# Patient Record
Sex: FEMALE | Race: WHITE | NOT HISPANIC OR LATINO
[De-identification: names, ages, dates, MRNs, and addresses within clinical notes are randomized per-mention and may not be internally consistent; named-entity substitution may affect disease eponyms.]

---

## 2018-08-02 ENCOUNTER — APPOINTMENT (OUTPATIENT)
Dept: VASCULAR SURGERY | Facility: CLINIC | Age: 73
End: 2018-08-02

## 2018-10-13 ENCOUNTER — EMERGENCY (EMERGENCY)
Facility: HOSPITAL | Age: 73
LOS: 0 days | Discharge: HOME | End: 2018-10-13
Attending: EMERGENCY MEDICINE | Admitting: EMERGENCY MEDICINE

## 2018-10-13 VITALS
HEART RATE: 58 BPM | SYSTOLIC BLOOD PRESSURE: 129 MMHG | WEIGHT: 188.05 LBS | DIASTOLIC BLOOD PRESSURE: 72 MMHG | RESPIRATION RATE: 20 BRPM | HEIGHT: 62 IN | OXYGEN SATURATION: 98 % | TEMPERATURE: 97 F

## 2018-10-13 DIAGNOSIS — I10 ESSENTIAL (PRIMARY) HYPERTENSION: ICD-10-CM

## 2018-10-13 DIAGNOSIS — Z23 ENCOUNTER FOR IMMUNIZATION: ICD-10-CM
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
## 2018-10-13 DIAGNOSIS — Z88.6 ALLERGY STATUS TO ANALGESIC AGENT: ICD-10-CM
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
## 2018-10-13 DIAGNOSIS — F32.9 MAJOR DEPRESSIVE DISORDER, SINGLE EPISODE, UNSPECIFIED: ICD-10-CM
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
## 2018-10-13 DIAGNOSIS — S61.212A LACERATION WITHOUT FOREIGN BODY OF RIGHT MIDDLE FINGER WITHOUT DAMAGE TO NAIL, INITIAL ENCOUNTER: ICD-10-CM
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
## 2018-10-13 DIAGNOSIS — J44.9 CHRONIC OBSTRUCTIVE PULMONARY DISEASE, UNSPECIFIED: ICD-10-CM

## 2018-10-13 DIAGNOSIS — Z79.82 LONG TERM (CURRENT) USE OF ASPIRIN: ICD-10-CM

## 2018-10-13 DIAGNOSIS — Z90.49 ACQUIRED ABSENCE OF OTHER SPECIFIED PARTS OF DIGESTIVE TRACT: Chronic | ICD-10-CM

## 2018-10-13 DIAGNOSIS — Z90.49 ACQUIRED ABSENCE OF OTHER SPECIFIED PARTS OF DIGESTIVE TRACT: ICD-10-CM

## 2018-10-13 RX ORDER — TETANUS TOXOID, REDUCED DIPHTHERIA TOXOID AND ACELLULAR PERTUSSIS VACCINE, ADSORBED 5; 2.5; 8; 8; 2.5 [IU]/.5ML; [IU]/.5ML; UG/.5ML; UG/.5ML; UG/.5ML
0.5 SUSPENSION INTRAMUSCULAR ONCE
Qty: 0 | Refills: 0 | Status: COMPLETED | OUTPATIENT
Start: 2018-10-13 | End: 2018-10-13

## 2018-10-13 RX ADMIN — TETANUS TOXOID, REDUCED DIPHTHERIA TOXOID AND ACELLULAR PERTUSSIS VACCINE, ADSORBED 0.5 MILLILITER(S): 5; 2.5; 8; 8; 2.5 SUSPENSION INTRAMUSCULAR at 09:00

## 2018-10-13 NOTE — ED PROVIDER NOTE - NS ED ROS FT
Constitutional: (-) fever  Musculoskeletal: (-) neck pain, (-) back pain, (-) joint pain  Integumentary: (+) Lac  Neurological: (-) headache, (-) altered mental status

## 2018-10-13 NOTE — ED PROVIDER NOTE - PRINCIPAL DIAGNOSIS
Laceration of finger of right hand without foreign body without damage to nail, unspecified finger, subsequent encounter

## 2018-10-13 NOTE — ED ADULT NURSE NOTE - NSIMPLEMENTINTERV_GEN_ALL_ED
Implemented All Universal Safety Interventions:  [place] to call system. Call bell, personal items and telephone within reach. Instruct patient to call for assistance. Room bathroom lighting operational. Non-slip footwear when patient is off stretcher. Physically safe environment: no spills, clutter or unnecessary equipment. Stretcher in lowest position, wheels locked, appropriate side rails in place.

## 2018-10-13 NOTE — ED PROVIDER NOTE - CARE PLAN
Principal Discharge DX:	Laceration of finger of right hand without foreign body without damage to nail, unspecified finger, subsequent encounter

## 2018-10-13 NOTE — ED PROVIDER NOTE - OBJECTIVE STATEMENT
73 year old female past medical history of COPD and Hypertension and states was cleaning toilet bowel and cut her right middle finger on sharp edge about 5 hours ago and it wont stop bleeding.

## 2018-10-13 NOTE — ED PROVIDER NOTE - PHYSICAL EXAMINATION
Physical Exam    Vital Signs: I have reviewed the initial vital signs.  Constitutional: well-nourished, appears stated age, no acute distress  Musculoskeletal: supple neck, no lower extremity edema, no midline tenderness  Integumentary: +Lac to right index finger 1.5cm to pad of finger in U shape  Neurologic: awake, alert, cranial nerves II-XII grossly intact, extremities’ motor and sensory functions grossly intact  Psychiatric: appropriate mood, appropriate affect

## 2018-10-13 NOTE — ED PROVIDER NOTE - ATTENDING CONTRIBUTION TO CARE
Right middle finger laceration while cleaning bathroom. Td is not uptodate.   Bleeding since 5 am.     PE: tip finger flap of skin laceration     Rec: laceration repair per CLEMENCIA Dumont.

## 2018-10-22 ENCOUNTER — INPATIENT (INPATIENT)
Facility: HOSPITAL | Age: 73
LOS: 3 days | Discharge: HOME HEALTH | End: 2018-10-26
Attending: INTERNAL MEDICINE | Admitting: INTERNAL MEDICINE

## 2018-10-22 VITALS
TEMPERATURE: 96 F | WEIGHT: 184.97 LBS | OXYGEN SATURATION: 96 % | HEART RATE: 70 BPM | RESPIRATION RATE: 18 BRPM | HEIGHT: 62 IN

## 2018-10-22 DIAGNOSIS — N30.90 CYSTITIS, UNSPECIFIED WITHOUT HEMATURIA: ICD-10-CM

## 2018-10-22 DIAGNOSIS — R42 DIZZINESS AND GIDDINESS: ICD-10-CM

## 2018-10-22 DIAGNOSIS — R00.1 BRADYCARDIA, UNSPECIFIED: ICD-10-CM

## 2018-10-22 DIAGNOSIS — K59.00 CONSTIPATION, UNSPECIFIED: ICD-10-CM

## 2018-10-22 DIAGNOSIS — Z87.891 PERSONAL HISTORY OF NICOTINE DEPENDENCE: ICD-10-CM

## 2018-10-22 DIAGNOSIS — J81.0 ACUTE PULMONARY EDEMA: ICD-10-CM

## 2018-10-22 DIAGNOSIS — F32.9 MAJOR DEPRESSIVE DISORDER, SINGLE EPISODE, UNSPECIFIED: ICD-10-CM

## 2018-10-22 DIAGNOSIS — I73.9 PERIPHERAL VASCULAR DISEASE, UNSPECIFIED: ICD-10-CM

## 2018-10-22 DIAGNOSIS — Z88.8 ALLERGY STATUS TO OTHER DRUGS, MEDICAMENTS AND BIOLOGICAL SUBSTANCES: ICD-10-CM

## 2018-10-22 DIAGNOSIS — K21.9 GASTRO-ESOPHAGEAL REFLUX DISEASE WITHOUT ESOPHAGITIS: ICD-10-CM

## 2018-10-22 DIAGNOSIS — Z88.5 ALLERGY STATUS TO NARCOTIC AGENT: ICD-10-CM

## 2018-10-22 DIAGNOSIS — Z28.21 IMMUNIZATION NOT CARRIED OUT BECAUSE OF PATIENT REFUSAL: ICD-10-CM

## 2018-10-22 DIAGNOSIS — K29.70 GASTRITIS, UNSPECIFIED, WITHOUT BLEEDING: ICD-10-CM

## 2018-10-22 DIAGNOSIS — Z79.82 LONG TERM (CURRENT) USE OF ASPIRIN: ICD-10-CM

## 2018-10-22 DIAGNOSIS — J44.9 CHRONIC OBSTRUCTIVE PULMONARY DISEASE, UNSPECIFIED: ICD-10-CM

## 2018-10-22 DIAGNOSIS — Z79.899 OTHER LONG TERM (CURRENT) DRUG THERAPY: ICD-10-CM

## 2018-10-22 DIAGNOSIS — J96.01 ACUTE RESPIRATORY FAILURE WITH HYPOXIA: ICD-10-CM

## 2018-10-22 DIAGNOSIS — I42.8 OTHER CARDIOMYOPATHIES: ICD-10-CM

## 2018-10-22 DIAGNOSIS — N17.9 ACUTE KIDNEY FAILURE, UNSPECIFIED: ICD-10-CM

## 2018-10-22 DIAGNOSIS — R33.8 OTHER RETENTION OF URINE: ICD-10-CM

## 2018-10-22 DIAGNOSIS — Z86.73 PERSONAL HISTORY OF TRANSIENT ISCHEMIC ATTACK (TIA), AND CEREBRAL INFARCTION WITHOUT RESIDUAL DEFICITS: ICD-10-CM

## 2018-10-22 DIAGNOSIS — Z90.49 ACQUIRED ABSENCE OF OTHER SPECIFIED PARTS OF DIGESTIVE TRACT: Chronic | ICD-10-CM

## 2018-10-22 DIAGNOSIS — M19.90 UNSPECIFIED OSTEOARTHRITIS, UNSPECIFIED SITE: ICD-10-CM

## 2018-10-22 PROBLEM — I10 ESSENTIAL (PRIMARY) HYPERTENSION: Chronic | Status: ACTIVE | Noted: 2018-10-13

## 2018-10-22 LAB
ALBUMIN SERPL ELPH-MCNC: 4.2 G/DL — SIGNIFICANT CHANGE UP (ref 3.5–5.2)
ALP SERPL-CCNC: 78 U/L — SIGNIFICANT CHANGE UP (ref 30–115)
ALT FLD-CCNC: 22 U/L — SIGNIFICANT CHANGE UP (ref 0–41)
ANION GAP SERPL CALC-SCNC: 13 MMOL/L — SIGNIFICANT CHANGE UP (ref 7–14)
APPEARANCE UR: CLEAR — SIGNIFICANT CHANGE UP
AST SERPL-CCNC: 30 U/L — SIGNIFICANT CHANGE UP (ref 0–41)
BACTERIA # UR AUTO: ABNORMAL
BASOPHILS # BLD AUTO: 0.07 K/UL — SIGNIFICANT CHANGE UP (ref 0–0.2)
BASOPHILS NFR BLD AUTO: 0.7 % — SIGNIFICANT CHANGE UP (ref 0–1)
BILIRUB SERPL-MCNC: 0.3 MG/DL — SIGNIFICANT CHANGE UP (ref 0.2–1.2)
BILIRUB UR-MCNC: NEGATIVE — SIGNIFICANT CHANGE UP
BUN SERPL-MCNC: 37 MG/DL — HIGH (ref 10–20)
CALCIUM SERPL-MCNC: 9.7 MG/DL — SIGNIFICANT CHANGE UP (ref 8.5–10.1)
CHLORIDE SERPL-SCNC: 102 MMOL/L — SIGNIFICANT CHANGE UP (ref 98–110)
CO2 SERPL-SCNC: 27 MMOL/L — SIGNIFICANT CHANGE UP (ref 17–32)
COD CRY URNS QL: NEGATIVE — SIGNIFICANT CHANGE UP
COLOR SPEC: YELLOW — SIGNIFICANT CHANGE UP
CREAT SERPL-MCNC: 1.6 MG/DL — HIGH (ref 0.7–1.5)
DIFF PNL FLD: ABNORMAL
EOSINOPHIL # BLD AUTO: 0.36 K/UL — SIGNIFICANT CHANGE UP (ref 0–0.7)
EOSINOPHIL NFR BLD AUTO: 3.5 % — SIGNIFICANT CHANGE UP (ref 0–8)
EPI CELLS # UR: ABNORMAL /HPF
GLUCOSE SERPL-MCNC: 50 MG/DL — LOW (ref 70–99)
GLUCOSE UR QL: NEGATIVE MG/DL — SIGNIFICANT CHANGE UP
GRAN CASTS # UR COMP ASSIST: NEGATIVE — SIGNIFICANT CHANGE UP
HCT VFR BLD CALC: 40.5 % — SIGNIFICANT CHANGE UP (ref 37–47)
HGB BLD-MCNC: 13.1 G/DL — SIGNIFICANT CHANGE UP (ref 12–16)
HYALINE CASTS # UR AUTO: NEGATIVE — SIGNIFICANT CHANGE UP
IMM GRANULOCYTES NFR BLD AUTO: 0.7 % — HIGH (ref 0.1–0.3)
KETONES UR-MCNC: NEGATIVE — SIGNIFICANT CHANGE UP
LACTATE SERPL-SCNC: 1.9 MMOL/L — SIGNIFICANT CHANGE UP (ref 0.5–2.2)
LEUKOCYTE ESTERASE UR-ACNC: ABNORMAL
LYMPHOCYTES # BLD AUTO: 1.26 K/UL — SIGNIFICANT CHANGE UP (ref 1.2–3.4)
LYMPHOCYTES # BLD AUTO: 12.3 % — LOW (ref 20.5–51.1)
MCHC RBC-ENTMCNC: 31 PG — SIGNIFICANT CHANGE UP (ref 27–31)
MCHC RBC-ENTMCNC: 32.3 G/DL — SIGNIFICANT CHANGE UP (ref 32–37)
MCV RBC AUTO: 95.7 FL — SIGNIFICANT CHANGE UP (ref 81–99)
MONOCYTES # BLD AUTO: 0.52 K/UL — SIGNIFICANT CHANGE UP (ref 0.1–0.6)
MONOCYTES NFR BLD AUTO: 5.1 % — SIGNIFICANT CHANGE UP (ref 1.7–9.3)
NEUTROPHILS # BLD AUTO: 7.98 K/UL — HIGH (ref 1.4–6.5)
NEUTROPHILS NFR BLD AUTO: 77.7 % — HIGH (ref 42.2–75.2)
NITRITE UR-MCNC: POSITIVE
NT-PROBNP SERPL-SCNC: 3667 PG/ML — HIGH (ref 0–300)
PH UR: 6.5 — SIGNIFICANT CHANGE UP (ref 5–8)
PLATELET # BLD AUTO: 233 K/UL — SIGNIFICANT CHANGE UP (ref 130–400)
POTASSIUM SERPL-MCNC: 3.9 MMOL/L — SIGNIFICANT CHANGE UP (ref 3.5–5)
POTASSIUM SERPL-SCNC: 3.9 MMOL/L — SIGNIFICANT CHANGE UP (ref 3.5–5)
PROT SERPL-MCNC: 6.8 G/DL — SIGNIFICANT CHANGE UP (ref 6–8)
PROT UR-MCNC: ABNORMAL MG/DL
RBC # BLD: 4.23 M/UL — SIGNIFICANT CHANGE UP (ref 4.2–5.4)
RBC # FLD: 13.9 % — SIGNIFICANT CHANGE UP (ref 11.5–14.5)
RBC CASTS # UR COMP ASSIST: SIGNIFICANT CHANGE UP /HPF
SODIUM SERPL-SCNC: 142 MMOL/L — SIGNIFICANT CHANGE UP (ref 135–146)
SP GR SPEC: 1.01 — SIGNIFICANT CHANGE UP (ref 1.01–1.03)
TRI-PHOS CRY UR QL COMP ASSIST: NEGATIVE — SIGNIFICANT CHANGE UP
TROPONIN T SERPL-MCNC: <0.01 NG/ML — SIGNIFICANT CHANGE UP
URATE CRY FLD QL MICRO: NEGATIVE — SIGNIFICANT CHANGE UP
UROBILINOGEN FLD QL: 0.2 MG/DL — SIGNIFICANT CHANGE UP (ref 0.2–0.2)
WBC # BLD: 10.26 K/UL — SIGNIFICANT CHANGE UP (ref 4.8–10.8)
WBC # FLD AUTO: 10.26 K/UL — SIGNIFICANT CHANGE UP (ref 4.8–10.8)
WBC UR QL: SIGNIFICANT CHANGE UP /HPF

## 2018-10-22 RX ORDER — TIOTROPIUM BROMIDE 18 UG/1
1 CAPSULE ORAL; RESPIRATORY (INHALATION) DAILY
Qty: 0 | Refills: 0 | Status: DISCONTINUED | OUTPATIENT
Start: 2018-10-22 | End: 2018-10-22

## 2018-10-22 RX ORDER — IBUPROFEN AND FAMOTIDINE 26.6; 8 MG/1; MG/1
0 TABLET, FILM COATED ORAL
Qty: 0 | Refills: 0 | COMMUNITY

## 2018-10-22 RX ORDER — ATORVASTATIN CALCIUM 80 MG/1
80 TABLET, FILM COATED ORAL AT BEDTIME
Qty: 0 | Refills: 0 | Status: DISCONTINUED | OUTPATIENT
Start: 2018-10-22 | End: 2018-10-26

## 2018-10-22 RX ORDER — ASPIRIN/CALCIUM CARB/MAGNESIUM 324 MG
81 TABLET ORAL DAILY
Qty: 0 | Refills: 0 | Status: DISCONTINUED | OUTPATIENT
Start: 2018-10-22 | End: 2018-10-26

## 2018-10-22 RX ORDER — SODIUM CHLORIDE 9 MG/ML
1000 INJECTION INTRAMUSCULAR; INTRAVENOUS; SUBCUTANEOUS ONCE
Qty: 0 | Refills: 0 | Status: DISCONTINUED | OUTPATIENT
Start: 2018-10-22 | End: 2018-10-22

## 2018-10-22 RX ORDER — FUROSEMIDE 40 MG
40 TABLET ORAL ONCE
Qty: 0 | Refills: 0 | Status: COMPLETED | OUTPATIENT
Start: 2018-10-22 | End: 2018-10-22

## 2018-10-22 RX ORDER — FUROSEMIDE 40 MG
40 TABLET ORAL DAILY
Qty: 0 | Refills: 0 | Status: DISCONTINUED | OUTPATIENT
Start: 2018-10-22 | End: 2018-10-23

## 2018-10-22 RX ORDER — ACETAMINOPHEN AND CHLORPHENIRAMINE MALEATE 325; 2 MG/1; MG/1
0 TABLET ORAL
Qty: 0 | Refills: 0 | COMMUNITY

## 2018-10-22 RX ORDER — METOPROLOL TARTRATE 50 MG
100 TABLET ORAL
Qty: 0 | Refills: 0 | Status: DISCONTINUED | OUTPATIENT
Start: 2018-10-22 | End: 2018-10-23

## 2018-10-22 RX ORDER — GABAPENTIN 400 MG/1
1 CAPSULE ORAL
Qty: 0 | Refills: 0 | COMMUNITY

## 2018-10-22 RX ORDER — SODIUM CHLORIDE 9 MG/ML
1000 INJECTION INTRAMUSCULAR; INTRAVENOUS; SUBCUTANEOUS ONCE
Qty: 0 | Refills: 0 | Status: COMPLETED | OUTPATIENT
Start: 2018-10-22 | End: 2018-10-22

## 2018-10-22 RX ORDER — LOSARTAN POTASSIUM 100 MG/1
50 TABLET, FILM COATED ORAL DAILY
Qty: 0 | Refills: 0 | Status: DISCONTINUED | OUTPATIENT
Start: 2018-10-22 | End: 2018-10-26

## 2018-10-22 RX ORDER — AZITHROMYCIN 500 MG/1
500 TABLET, FILM COATED ORAL ONCE
Qty: 0 | Refills: 0 | Status: COMPLETED | OUTPATIENT
Start: 2018-10-22 | End: 2018-10-22

## 2018-10-22 RX ORDER — CEFTRIAXONE 500 MG/1
1 INJECTION, POWDER, FOR SOLUTION INTRAMUSCULAR; INTRAVENOUS ONCE
Qty: 0 | Refills: 0 | Status: COMPLETED | OUTPATIENT
Start: 2018-10-22 | End: 2018-10-22

## 2018-10-22 RX ORDER — DOCUSATE SODIUM 100 MG
100 CAPSULE ORAL
Qty: 0 | Refills: 0 | Status: DISCONTINUED | OUTPATIENT
Start: 2018-10-22 | End: 2018-10-26

## 2018-10-22 RX ORDER — LACTULOSE 10 G/15ML
20 SOLUTION ORAL DAILY
Qty: 0 | Refills: 0 | Status: DISCONTINUED | OUTPATIENT
Start: 2018-10-22 | End: 2018-10-26

## 2018-10-22 RX ORDER — HEPARIN SODIUM 5000 [USP'U]/ML
5000 INJECTION INTRAVENOUS; SUBCUTANEOUS EVERY 12 HOURS
Qty: 0 | Refills: 0 | Status: DISCONTINUED | OUTPATIENT
Start: 2018-10-22 | End: 2018-10-26

## 2018-10-22 RX ORDER — IPRATROPIUM BROMIDE 0.2 MG/ML
500 SOLUTION, NON-ORAL INHALATION EVERY 6 HOURS
Qty: 0 | Refills: 0 | Status: DISCONTINUED | OUTPATIENT
Start: 2018-10-22 | End: 2018-10-26

## 2018-10-22 RX ORDER — BUDESONIDE AND FORMOTEROL FUMARATE DIHYDRATE 160; 4.5 UG/1; UG/1
2 AEROSOL RESPIRATORY (INHALATION)
Qty: 0 | Refills: 0 | Status: DISCONTINUED | OUTPATIENT
Start: 2018-10-22 | End: 2018-10-26

## 2018-10-22 RX ADMIN — CEFTRIAXONE 100 GRAM(S): 500 INJECTION, POWDER, FOR SOLUTION INTRAMUSCULAR; INTRAVENOUS at 15:11

## 2018-10-22 RX ADMIN — AZITHROMYCIN 255 MILLIGRAM(S): 500 TABLET, FILM COATED ORAL at 15:11

## 2018-10-22 RX ADMIN — Medication 40 MILLIGRAM(S): at 16:57

## 2018-10-22 RX ADMIN — BUDESONIDE AND FORMOTEROL FUMARATE DIHYDRATE 2 PUFF(S): 160; 4.5 AEROSOL RESPIRATORY (INHALATION) at 15:23

## 2018-10-22 RX ADMIN — SODIUM CHLORIDE 2000 MILLILITER(S): 9 INJECTION INTRAMUSCULAR; INTRAVENOUS; SUBCUTANEOUS at 13:57

## 2018-10-22 NOTE — ED ADULT TRIAGE NOTE - CHIEF COMPLAINT QUOTE
"Around 10am I started to feel very dizzy with a bad headache, I started to get chills, pain in my stomach and back"

## 2018-10-22 NOTE — ED ADULT NURSE NOTE - NSIMPLEMENTINTERV_GEN_ALL_ED
Implemented All Fall with Harm Risk Interventions:  [place] to call system. Call bell, personal items and telephone within reach. Instruct patient to call for assistance. Room bathroom lighting operational. Non-slip footwear when patient is off stretcher. Physically safe environment: no spills, clutter or unnecessary equipment. Stretcher in lowest position, wheels locked, appropriate side rails in place. Provide visual cue, wrist band, yellow gown, etc. Monitor gait and stability. Monitor for mental status changes and reorient to person, place, and time. Review medications for side effects contributing to fall risk. Reinforce activity limits and safety measures with patient and family. Provide visual clues: red socks.

## 2018-10-22 NOTE — CONSULT NOTE ADULT - ASSESSMENT
Assessment-  1. Acute CHF / Pulmonary edema.  2. Acute respiratory failure.  3. H/O HTN.  4. COPD. Ex smoker.    Recommendations-  1. No IV Fluids. KVO.  2. IV Lasix , 40 mg given in ER at 6 pm.   3. IV Lasix 40 mg again at 11 pm tonight and once on 10/23/18.  4. Hold po verapamil.   5. Continue on Valsartan and Metoprolol.  6. BIPAP tonight and d/c in am and give nasal canula O2 tomorrow.  7. No steroids.  8. Cardiology consult.  9. EKG in am and serial C Enzymes.  10. DVT Prophylaxis.

## 2018-10-22 NOTE — ED ADULT NURSE NOTE - PMH
COPD (chronic obstructive pulmonary disease)    Depression    Hypertension    TIA (transient ischemic attack)

## 2018-10-22 NOTE — ED PROVIDER NOTE - PROGRESS NOTE DETAILS
pt back from CT I personally evaluated the patient. I reviewed the Resident’s or Physician Assistant’s note (as assigned above), and agree with the findings and plan except as documented in my note. Acute onset dizziness and HA at 10am today. Sx increased with movement of the head and with sitting up. No change in vision, speech, motor sensory function of the extremities. VS noted. Of note pt on calcium channel blocker. Note rightward nystagmus and (+) Hallpike maneuver. see NIHSS scale.  CT non contrast rev'd d/w neuro. will obtain CT angio pt with acute resp distress.  rales now 2/3 up.  CXR c/w chf.  placed on Bipap with improvement.  lasix ordered.  abd is now ttp mid left side.. CT ordered.  family updated. Pt well appearing  -  on bipap.  CTA head and neck no acute occlusin -   CT a/p - distended baldde r- stephens to be placed -   printed ad discussed results with family - d.w intensivist pt back from CT.  note stroke code called re: suspicion for ICB. pt with acute resp distress.  rales now 2/3 up.  CXR c/w chf.  placed on Bipap with improvement.  Lasix ordered.  abd is now ttp mid left side. CT ordered.  family updated. Pt well appearing  -  on bipap.  CTA head and neck no acute occlusin -   CT a/p - distended bladder- stephens to be placed -   dx testing d/w discussed results with family - d/w intensivist

## 2018-10-22 NOTE — ED PROVIDER NOTE - OBJECTIVE STATEMENT
74 yo F w/ h/o COPD not on O2, HTN, depression, LVH, TIA presented with c/o sudden onset vertigo at 10 am this morning when she was watching TV in recliner. She had difficulty moving around after that as she had to hold on to things. It was associated with severe frontal headache, lower abdominal pain, nausea. Since then symptoms have been persistent. On presentation to ED, she is AAO*3, BP on presentation 70/50--> improved to 117/78 with 1 L IVF bolus, HR -44--> sinus stella w/ PACs on EKG. Stroke code called and CT head, at angio H and N ordered, spoke with neurology. 72 yo F w/ h/o COPD not on O2, HTN, depression, LVH, TIA presented with c/o sudden onset vertigo at 10 am this morning when she was watching TV in recliner. She had difficulty moving around after that as she had to hold on to things. It was associated with severe frontal headache, lower abdominal pain, nausea. Since then symptoms have been persistent. She also has been having increased SOB on exertion and dry cough worse from baseline for last couple of days. On presentation to ED, she is AAO*3, BP on presentation 70/50--> improved to 117/78 with 1 L IVF bolus, HR -44--> sinus stella w/ PACs on EKG. Stroke code called and CT head, at angio H and N ordered, spoke with neurologist Dr Gonzales.

## 2018-10-22 NOTE — ED PROVIDER NOTE - CARE PLAN
Principal Discharge DX:	Dizziness  Secondary Diagnosis:	Acute pulmonary edema  Secondary Diagnosis:	Urinary retention

## 2018-10-22 NOTE — ED PROVIDER NOTE - MEDICAL DECISION MAKING DETAILS
Pt p/w dizziness  ,  gregory tinto pulm edema in ED -  improved with bipap  lasix -  CT a/p CTA - reviewed and discussed with patient and family - results printed for them -   sseen by  Intensivist -  will admit to icu Pt p/w dizziness  ,  gregory tinto pulm edema in ED -  improved with bipap  lasix -  CT a/p CTA - reviewed and discussed with patient and family - results printed for them -   seen by  Intensivist -  will admit to icu

## 2018-10-22 NOTE — ED PROVIDER NOTE - PHYSICAL EXAMINATION
VITAL SIGNS: I have reviewed nursing notes and confirm.  CONSTITUTIONAL: in no acute distress  SKIN: Skin exam is warm and dry, no acute rash.  HEAD: Normocephalic; atraumatic.  EYES: PERRL, EOM intact; conjunctiva and sclera clear; R sided nystagmus +   ENT: No nasal discharge; airway clear. Throat clear.  NECK: Supple; non tender.  No lymphadenopathy.  CARD: S1, S2 normal; no murmurs, gallops, or rubs. Regular rate and rhythm.  RESP: No wheezes, rales or rhonchi.  ABD: Normal bowel sounds; soft; non-distended; non-tender; no hepatosplenomegaly.  EXT: Normal ROM. No clubbing, cyanosis or edema.  NEURO: Alert, oriented. Grossly unremarkable. No focal deficits.  PSYCH: Cooperative, appropriate.

## 2018-10-22 NOTE — CONSULT NOTE ADULT - SUBJECTIVE AND OBJECTIVE BOX
Patient is a 73y old  Female who presents with a chief complaint of SOB , since morning.    HPI: Patient is a known case of HTN , COPD and has been under the care of pulmonologist and cardiologist. Patient started to have SOB after 10 am today. No chest pain , cough , fever or phlegm.  Felt mild headache.        PAST MEDICAL & SURGICAL HISTORY:  TIA (transient ischemic attack)  COPD (chronic obstructive pulmonary disease)  Depression  Hypertension  History of cholecystectomy.    Personal history- . ex smoker- quit 25 years ago.        MEDICATIONS  (STANDING):  buDESOnide 160 MICROgram(s)/formoterol 4.5 MICROgram(s) Inhaler 2 Puff(s) Inhalation two times a day  ipratropium    for Nebulization 500 MICROGram(s) Nebulizer every 6 hours    Allergies    albuterol (Unknown)  codeine (Other (Moderate))  Depakote (Unknown)    Intolerances      Vital Signs Last 24 Hrs  T(C): 35.7 (22 Oct 2018 15:15), Max: 35.7 (22 Oct 2018 15:15)  T(F): 96.3 (22 Oct 2018 15:15), Max: 96.3 (22 Oct 2018 15:15)  HR: 56 (22 Oct 2018 20:11) (51 - 86)  BP: 122/59 (22 Oct 2018 20:11) (100/49 - 167/74)  BP(mean): --  RR: 20 (22 Oct 2018 20:11) (14 - 20)  SpO2: 100% (22 Oct 2018 20:11) (95% - 100%)  PHYSICAL EXAM:      Constitutional: obese. on BIPAP in ER.    Eyes: normal.    ENMT: normal.    Neck: no LN enlargement.          Respiratory: crepitations, lower half. right more than left.    Cardiovascular: regular. no murmur.    Gastrointestinal: soft , non tender.    Genitourinary: no renal angle tenderness.    Rectal: deferred.    Extremities: no leg edema.    Vascular:    Neurological: speech- normal . CN normal. no focal deficit.    Skin: no rash.    Lymph Nodes:    Musculoskeletal: no tenderness.    Psychiatric:      10-22    142  |  102  |  37<H>  ----------------------------<  50<L>  3.9   |  27  |  1.6<H>    Ca    9.7      22 Oct 2018 14:05    TPro  6.8  /  Alb  4.2  /  TBili  0.3  /  DBili  x   /  AST  30  /  ALT  22  /  AlkPhos  78  10-22    Urinalysis Basic - ( 22 Oct 2018 17:20 )    Color: Yellow / Appearance: Clear / S.015 / pH: x  Gluc: x / Ketone: Negative  / Bili: Negative / Urobili: 0.2 mg/dL   Blood: x / Protein: Trace mg/dL / Nitrite: Positive   Leuk Esterase: Small / RBC: 1-2 /HPF / WBC 3-5 /HPF   Sq Epi: x / Non Sq Epi: Occasional /HPF / Bacteria: Moderate      CBC Full  -  ( 22 Oct 2018 14:05 )  WBC Count : 10.26 K/uL  Hemoglobin : 13.1 g/dL  Hematocrit : 40.5 %  Platelet Count - Automated : 233 K/uL  Mean Cell Volume : 95.7 fL  Mean Cell Hemoglobin : 31.0 pg  Mean Cell Hemoglobin Concentration : 32.3 g/dL  Auto Neutrophil # : 7.98 K/uL  Auto Lymphocyte # : 1.26 K/uL  Auto Monocyte # : 0.52 K/uL  Auto Eosinophil # : 0.36 K/uL  Auto Basophil # : 0.07 K/uL  Auto Neutrophil % : 77.7 %  Auto Lymphocyte % : 12.3 %  Auto Monocyte % : 5.1 %  Auto Eosinophil % : 3.5 %  Auto Basophil % : 0.7 %          EKG Sinus bradycardia , T inversion V1, V2.    Radiology: Bilateral congestion.  BNP- 3500.

## 2018-10-22 NOTE — ED PROVIDER NOTE - NS ED ROS FT
Constitutional: See HPI.  Eyes: No visual changes, eye pain or discharge. No Photophobia  ENMT: No hearing changes, pain, discharge or infections. No neck pain or stiffness. No limited ROM  Cardiac: + SOB; no edema. No chest pain with exertion.  Respiratory: No cough or respiratory distress. No hemoptysis. No history of asthma or RAD.  GI: + nausea, no vomiting, diarrhea, + lower abdominal pain.  : No dysuria, frequency or burning. No Discharge  MS: No myalgia, muscle weakness, joint pain or back pain.  Neuro: + headache. No LOC.  Skin: No skin rash.  Except as documented in the HPI, all other systems are negative.

## 2018-10-23 LAB
ALBUMIN SERPL ELPH-MCNC: 4.2 G/DL — SIGNIFICANT CHANGE UP (ref 3.5–5.2)
ALP SERPL-CCNC: 85 U/L — SIGNIFICANT CHANGE UP (ref 30–115)
ALT FLD-CCNC: 20 U/L — SIGNIFICANT CHANGE UP (ref 0–41)
ANION GAP SERPL CALC-SCNC: 19 MMOL/L — HIGH (ref 7–14)
AST SERPL-CCNC: 25 U/L — SIGNIFICANT CHANGE UP (ref 0–41)
BILIRUB SERPL-MCNC: 0.5 MG/DL — SIGNIFICANT CHANGE UP (ref 0.2–1.2)
BUN SERPL-MCNC: 37 MG/DL — HIGH (ref 10–20)
CALCIUM SERPL-MCNC: 9.5 MG/DL — SIGNIFICANT CHANGE UP (ref 8.5–10.1)
CHLORIDE SERPL-SCNC: 100 MMOL/L — SIGNIFICANT CHANGE UP (ref 98–110)
CK MB CFR SERPL CALC: 2.8 NG/ML — SIGNIFICANT CHANGE UP (ref 0.6–6.3)
CO2 SERPL-SCNC: 25 MMOL/L — SIGNIFICANT CHANGE UP (ref 17–32)
CREAT SERPL-MCNC: 1.2 MG/DL — SIGNIFICANT CHANGE UP (ref 0.7–1.5)
GLUCOSE SERPL-MCNC: 74 MG/DL — SIGNIFICANT CHANGE UP (ref 70–99)
HCT VFR BLD CALC: 44.7 % — SIGNIFICANT CHANGE UP (ref 37–47)
HGB BLD-MCNC: 14.5 G/DL — SIGNIFICANT CHANGE UP (ref 12–16)
MAGNESIUM SERPL-MCNC: 1.9 MG/DL — SIGNIFICANT CHANGE UP (ref 1.8–2.4)
MCHC RBC-ENTMCNC: 30.6 PG — SIGNIFICANT CHANGE UP (ref 27–31)
MCHC RBC-ENTMCNC: 32.4 G/DL — SIGNIFICANT CHANGE UP (ref 32–37)
MCV RBC AUTO: 94.3 FL — SIGNIFICANT CHANGE UP (ref 81–99)
NRBC # BLD: 0 /100 WBCS — SIGNIFICANT CHANGE UP (ref 0–0)
PLATELET # BLD AUTO: 220 K/UL — SIGNIFICANT CHANGE UP (ref 130–400)
POTASSIUM SERPL-MCNC: 3.9 MMOL/L — SIGNIFICANT CHANGE UP (ref 3.5–5)
POTASSIUM SERPL-SCNC: 3.9 MMOL/L — SIGNIFICANT CHANGE UP (ref 3.5–5)
PROT SERPL-MCNC: 7 G/DL — SIGNIFICANT CHANGE UP (ref 6–8)
RBC # BLD: 4.74 M/UL — SIGNIFICANT CHANGE UP (ref 4.2–5.4)
RBC # FLD: 14 % — SIGNIFICANT CHANGE UP (ref 11.5–14.5)
SODIUM SERPL-SCNC: 144 MMOL/L — SIGNIFICANT CHANGE UP (ref 135–146)
TROPONIN T SERPL-MCNC: <0.01 NG/ML — SIGNIFICANT CHANGE UP
WBC # BLD: 13.65 K/UL — HIGH (ref 4.8–10.8)
WBC # FLD AUTO: 13.65 K/UL — HIGH (ref 4.8–10.8)

## 2018-10-23 RX ORDER — CEFTRIAXONE 500 MG/1
INJECTION, POWDER, FOR SOLUTION INTRAMUSCULAR; INTRAVENOUS
Qty: 0 | Refills: 0 | Status: DISCONTINUED | OUTPATIENT
Start: 2018-10-23 | End: 2018-10-26

## 2018-10-23 RX ORDER — IBUPROFEN 200 MG
400 TABLET ORAL ONCE
Qty: 0 | Refills: 0 | Status: COMPLETED | OUTPATIENT
Start: 2018-10-23 | End: 2018-10-23

## 2018-10-23 RX ORDER — IBUPROFEN 200 MG
400 TABLET ORAL
Qty: 0 | Refills: 0 | Status: DISCONTINUED | OUTPATIENT
Start: 2018-10-23 | End: 2018-10-23

## 2018-10-23 RX ORDER — CEFTRIAXONE 500 MG/1
1 INJECTION, POWDER, FOR SOLUTION INTRAMUSCULAR; INTRAVENOUS ONCE
Qty: 0 | Refills: 0 | Status: COMPLETED | OUTPATIENT
Start: 2018-10-23 | End: 2018-10-23

## 2018-10-23 RX ORDER — CEFTRIAXONE 500 MG/1
1 INJECTION, POWDER, FOR SOLUTION INTRAMUSCULAR; INTRAVENOUS EVERY 24 HOURS
Qty: 0 | Refills: 0 | Status: DISCONTINUED | OUTPATIENT
Start: 2018-10-24 | End: 2018-10-26

## 2018-10-23 RX ORDER — ALPRAZOLAM 0.25 MG
0.5 TABLET ORAL
Qty: 0 | Refills: 0 | Status: DISCONTINUED | OUTPATIENT
Start: 2018-10-23 | End: 2018-10-23

## 2018-10-23 RX ORDER — METOPROLOL TARTRATE 50 MG
50 TABLET ORAL
Qty: 0 | Refills: 0 | Status: DISCONTINUED | OUTPATIENT
Start: 2018-10-23 | End: 2018-10-26

## 2018-10-23 RX ORDER — PANTOPRAZOLE SODIUM 20 MG/1
40 TABLET, DELAYED RELEASE ORAL
Qty: 0 | Refills: 0 | Status: DISCONTINUED | OUTPATIENT
Start: 2018-10-23 | End: 2018-10-26

## 2018-10-23 RX ORDER — INFLUENZA VIRUS VACCINE 15; 15; 15; 15 UG/.5ML; UG/.5ML; UG/.5ML; UG/.5ML
0.5 SUSPENSION INTRAMUSCULAR ONCE
Qty: 0 | Refills: 0 | Status: COMPLETED | OUTPATIENT
Start: 2018-10-23 | End: 2018-10-23

## 2018-10-23 RX ORDER — FUROSEMIDE 40 MG
40 TABLET ORAL
Qty: 0 | Refills: 0 | Status: DISCONTINUED | OUTPATIENT
Start: 2018-10-23 | End: 2018-10-24

## 2018-10-23 RX ORDER — ACETAMINOPHEN 500 MG
650 TABLET ORAL EVERY 6 HOURS
Qty: 0 | Refills: 0 | Status: DISCONTINUED | OUTPATIENT
Start: 2018-10-23 | End: 2018-10-26

## 2018-10-23 RX ADMIN — LOSARTAN POTASSIUM 50 MILLIGRAM(S): 100 TABLET, FILM COATED ORAL at 05:20

## 2018-10-23 RX ADMIN — Medication 81 MILLIGRAM(S): at 11:39

## 2018-10-23 RX ADMIN — BUDESONIDE AND FORMOTEROL FUMARATE DIHYDRATE 2 PUFF(S): 160; 4.5 AEROSOL RESPIRATORY (INHALATION) at 22:17

## 2018-10-23 RX ADMIN — Medication 400 MILLIGRAM(S): at 15:07

## 2018-10-23 RX ADMIN — Medication 500 MICROGRAM(S): at 13:55

## 2018-10-23 RX ADMIN — Medication 0.5 MILLIGRAM(S): at 14:23

## 2018-10-23 RX ADMIN — Medication 40 MILLIGRAM(S): at 05:20

## 2018-10-23 RX ADMIN — HEPARIN SODIUM 5000 UNIT(S): 5000 INJECTION INTRAVENOUS; SUBCUTANEOUS at 05:20

## 2018-10-23 RX ADMIN — Medication 400 MILLIGRAM(S): at 17:19

## 2018-10-23 RX ADMIN — BUDESONIDE AND FORMOTEROL FUMARATE DIHYDRATE 2 PUFF(S): 160; 4.5 AEROSOL RESPIRATORY (INHALATION) at 11:16

## 2018-10-23 RX ADMIN — Medication 500 MICROGRAM(S): at 06:00

## 2018-10-23 RX ADMIN — PANTOPRAZOLE SODIUM 40 MILLIGRAM(S): 20 TABLET, DELAYED RELEASE ORAL at 07:56

## 2018-10-23 RX ADMIN — Medication 40 MILLIGRAM(S): at 00:07

## 2018-10-23 RX ADMIN — LACTULOSE 20 GRAM(S): 10 SOLUTION ORAL at 11:39

## 2018-10-23 RX ADMIN — Medication 100 MILLIGRAM(S): at 05:20

## 2018-10-23 RX ADMIN — Medication 40 MILLIGRAM(S): at 17:44

## 2018-10-23 RX ADMIN — CEFTRIAXONE 100 GRAM(S): 500 INJECTION, POWDER, FOR SOLUTION INTRAMUSCULAR; INTRAVENOUS at 11:16

## 2018-10-23 RX ADMIN — ATORVASTATIN CALCIUM 80 MILLIGRAM(S): 80 TABLET, FILM COATED ORAL at 22:17

## 2018-10-23 RX ADMIN — HEPARIN SODIUM 5000 UNIT(S): 5000 INJECTION INTRAVENOUS; SUBCUTANEOUS at 17:44

## 2018-10-23 RX ADMIN — Medication 500 MICROGRAM(S): at 02:42

## 2018-10-23 RX ADMIN — Medication 0.5 MILLIGRAM(S): at 22:17

## 2018-10-23 RX ADMIN — Medication 500 MICROGRAM(S): at 19:50

## 2018-10-23 NOTE — CONSULT NOTE ADULT - SUBJECTIVE AND OBJECTIVE BOX
Neurology Consultation note    Name  SHAUN COATES    HPI:  72 yo F w/ h/o COPD not on O2, HTN, depression, LVH, TIA presented with c/o sudden onset vertigo at 10 am this morning when she was watching TV in recliner. She had difficulty moving around after that as she had to hold on to things. It was associated with severe frontal headache, lower abdominal pain, nausea. Since then symptoms have been persistent. She also has been having increased SOB on exertion and dry cough worse from baseline for last couple of days. On presentation to ED, she is AAO*3, BP on presentation 70/50--> improved to 117/78 with 1 L IVF bolus, HR -44--> sinus stella w/ PACs on EKG. Stroke code called and CT head, at angio H and N ordered,      Interval History:        Vital Signs Last 24 Hrs  T(C): 36.8 (22 Oct 2018 23:01), Max: 36.8 (22 Oct 2018 23:01)  T(F): 98.2 (22 Oct 2018 23:01), Max: 98.2 (22 Oct 2018 23:01)  HR: 60 (23 Oct 2018 05:01) (51 - 86)  BP: 124/61 (23 Oct 2018 05:01) (100/49 - 167/74)  BP(mean): 87 (23 Oct 2018 03:01) (87 - 91)  RR: 15 (23 Oct 2018 05:01) (14 - 20)  SpO2: 97% (23 Oct 2018 05:01) (95% - 100%)    Neurological Exam:   Mental status: Awake, alert and oriented x3.  Recent and remote memory intact.  Naming, repetition and comprehension intact.  Attention/concentration intact.  No dysarthria, no aphasia.  Fund of knowledge appropriate.    Cranial nerves: Pupils equally round and reactive to light, visual fields full, no nystagmus, extraocular muscles intact, V1 through V3 intact bilaterally and symmetric, face symmetric, hearing intact to finger rub, palate elevation symmetric, tongue was midline.  Motor:  MRC grading 5/5 b/l UE/LE.   strength 5/5.  Normal tone and bulk.  No abnormal movements.    Sensation: Intact to light touch, proprioception, and pinprick.   Coordination: No dysmetria on finger-to-nose and heel-to-shin.  No dysdiadokinesia.  Reflexes: 2+ in bilateral UE/LE, downgoing toes bilaterally. (-) Perales.  Gait: Narrow and steady. No ataxia.  Romberg negative    Medications  aspirin  chewable 81 milliGRAM(s) Oral daily  atorvastatin 80 milliGRAM(s) Oral at bedtime  buDESOnide 160 MICROgram(s)/formoterol 4.5 MICROgram(s) Inhaler 2 Puff(s) Inhalation two times a day  docusate sodium 100 milliGRAM(s) Oral two times a day  furosemide    Tablet 40 milliGRAM(s) Oral daily  heparin  Injectable 5000 Unit(s) SubCutaneous every 12 hours  influenza   Vaccine 0.5 milliLiter(s) IntraMuscular once  ipratropium    for Nebulization 500 MICROGram(s) Nebulizer every 6 hours  lactulose Syrup 20 Gram(s) Oral daily  losartan 50 milliGRAM(s) Oral daily  metoprolol tartrate 100 milliGRAM(s) Oral two times a day      Lab  10-22    142  |  102  |  37<H>  ----------------------------<  50<L>  3.9   |  27  |  1.6<H>    Ca    9.7      22 Oct 2018 14:05    TPro  6.8  /  Alb  4.2  /  TBili  0.3  /  DBili  x   /  AST  30  /  ALT  22  /  AlkPhos  78  10-22                          13.1   10.26 )-----------( 233      ( 22 Oct 2018 14:05 )             40.5     LIVER FUNCTIONS - ( 22 Oct 2018 14:05 )  Alb: 4.2 g/dL / Pro: 6.8 g/dL / ALK PHOS: 78 U/L / ALT: 22 U/L / AST: 30 U/L / GGT: x                   Radiology      Assessment:    Plan: Neurology Consultation note    Name  SHAUN COATES    HPI:  74 yo F w/ h/o COPD not on O2, HTN, depression, LVH, TIA presented with c/o sudden onset vertigo at 10 am this morning when she was watching TV in recliner. She had difficulty moving around after that as she had to hold on to things. It was associated with severe frontal headache, lower abdominal pain, nausea. Since then symptoms have been persistent. She also has been having increased SOB on exertion and dry cough worse from baseline for last couple of days. On presentation to ED, she is AAO*3, BP on presentation 70/50--> improved to 117/78 with 1 L IVF bolus, HR -44--> sinus stella w/ PACs on EKG. Stroke code called and CT head, at angio H and N ordered,      NEURO:  PATIENT IS A 74 YO FEMALE WITH THE AFOREMENTIONED HX WHO PRESENTED WITH HA, LIGHTHEADEDNESS (SHE DENIES VERTIGO) AND BP AS ABOVE  PATIENT WAS DETERMINED TO BE IN ACUTE CHF  TODAY SHE REPORTS THAT THE HA IS MOSTLY RESOLVED AS IS THE LIGHTHEADEDNESS  SHE DENIES ANY FOCAL NEURO COMPLAINTS    CTA H/N WAS DONE TO R/O DISSECTION. NO DISSECTION WAS NOTED AND. PATIENT WITH MODERATE PROXIMAL IC A STENOSIS AND ATHEROSCLEROTIC CHANGES WHICH ARE INCIDENTAL TO HER PRESENTATION      Vital Signs Last 24 Hrs  T(C): 36.8 (22 Oct 2018 23:01), Max: 36.8 (22 Oct 2018 23:01)  T(F): 98.2 (22 Oct 2018 23:01), Max: 98.2 (22 Oct 2018 23:01)  HR: 60 (23 Oct 2018 05:01) (51 - 86)  BP: 124/61 (23 Oct 2018 05:01) (100/49 - 167/74)  BP(mean): 87 (23 Oct 2018 03:01) (87 - 91)  RR: 15 (23 Oct 2018 05:01) (14 - 20)  SpO2: 97% (23 Oct 2018 05:01) (95% - 100%)    Neurological Exam:   Mental status: Awake, alert and oriented x3.  Recent and remote memory intact.  Naming, repetition and comprehension intact.  Attention/concentration intact.  No dysarthria, no aphasia.  Fund of knowledge appropriate.    Cranial nerves: Pupils equally round and reactive to light, visual fields full, no nystagmus, extraocular muscles intact, V1 through V3 intact bilaterally and symmetric, face symmetric, hearing intact to finger rub, palate elevation symmetric, tongue was midline.  Motor:  MRC grading 5/5 b/l UE/LE.   strength 5/5.  Normal tone and bulk.  No abnormal movements.    Sensation: Intact to light touch, proprioception, and pinprick.   Coordination: No dysmetria on finger-to-nose and heel-to-shin.  No dysdiadokinesia.  Reflexes: 2+ in bilateral UE/LE, downgoing toes bilaterally. (-) Perales.      Medications  aspirin  chewable 81 milliGRAM(s) Oral daily  atorvastatin 80 milliGRAM(s) Oral at bedtime  buDESOnide 160 MICROgram(s)/formoterol 4.5 MICROgram(s) Inhaler 2 Puff(s) Inhalation two times a day  docusate sodium 100 milliGRAM(s) Oral two times a day  furosemide    Tablet 40 milliGRAM(s) Oral daily  heparin  Injectable 5000 Unit(s) SubCutaneous every 12 hours  influenza   Vaccine 0.5 milliLiter(s) IntraMuscular once  ipratropium    for Nebulization 500 MICROGram(s) Nebulizer every 6 hours  lactulose Syrup 20 Gram(s) Oral daily  losartan 50 milliGRAM(s) Oral daily  metoprolol tartrate 100 milliGRAM(s) Oral two times a day      Lab  10-22    142  |  102  |  37<H>  ----------------------------<  50<L>  3.9   |  27  |  1.6<H>    Ca    9.7      22 Oct 2018 14:05    TPro  6.8  /  Alb  4.2  /  TBili  0.3  /  DBili  x   /  AST  30  /  ALT  22  /  AlkPhos  78  10-22                          13.1   10.26 )-----------( 233      ( 22 Oct 2018 14:05 )             40.5     LIVER FUNCTIONS - ( 22 Oct 2018 14:05 )  Alb: 4.2 g/dL / Pro: 6.8 g/dL / ALK PHOS: 78 U/L / ALT: 22 U/L / AST: 30 U/L / GGT: x                   Radiology      Assessment: 74 YO FEMALE WITH THE AFOREMENTIONED HX PRESENTS WITH HA AND LIGHT HEADEDNESS. NOW RESOLVED  LIGHTHEADEDNESS IS LIKELY DUE TO HYPOTENSION ON ADM. SHE DOES HAVE A HX OF HA BUT THIS ONE WAS MORE INTENSE.  HA NOW RESOLVED.  CTH NO ACUTE CHANGES AND EXAM IS NON FOCAL    Plan:  NO FURTHER INPATIENT W/U IS NEEDED AT THIS TIME  WOULD CONSIDER OUTPATIENT MRI BRAIN NC ONCE ACUTE MEDICAL ISSUES ARE RESOLVED.  PLEASE CALL WITH ANY QUESTIONS

## 2018-10-23 NOTE — H&P ADULT - HISTORY OF PRESENT ILLNESS
74 yo Female w/ h/o COPD not on O2, HTN, depression, LVH, TIA presented with c/o sudden onset vertigo at 10 am this morning when she was watching TV in recliner. She had difficulty moving around after that as she had to hold on to things. It was associated with severe frontal headache, lower abdominal pain, nausea. Since then symptoms have been persistent. She also has been having increased SOB on exertion and dry cough worse from baseline for last couple of days. On presentation to ED, she is AAO*3, BP on presentation 70/50--> improved to 117/78 with 1 L IVF bolus, HR -44--> sinus stella w/ PACs on EKG. Stroke code called and CT head, at angio H and N ordered which were reported negative .

## 2018-10-23 NOTE — CHART NOTE - NSCHARTNOTEFT_GEN_A_CORE
patient takes lorazepam  0.5 mg PO  twice a day  as her home medication- confirmed with pharmacy- [facility]

## 2018-10-23 NOTE — CONSULT NOTE ADULT - SUBJECTIVE AND OBJECTIVE BOX
Patient is a 73y old  Female who presents with a chief complaint of vertigo , sob (23 Oct 2018 09:55)      HPI:  74 yo Female w/ h/o COPD not on O2, HTN, depression, LVH, TIA presented with c/o sudden onset vertigo at 10 am this morning when she was watching TV in recliner. She had difficulty moving around after that as she had to hold on to things. It was associated with severe frontal headache, lower abdominal pain, nausea. Since then symptoms have been persistent. She also has been having increased SOB on exertion and dry cough worse from baseline for last couple of days. On presentation to ED, she is AAO*3, BP on presentation 70/50--> improved to 117/78 with 1 L IVF bolus, HR -44--> sinus stella w/ PACs on EKG. Stroke code called and CT head, at angio H and N ordered which were reported negative . (23 Oct 2018 09:55)      PAST MEDICAL & SURGICAL HISTORY:  PVD (peripheral vascular disease)  Other emphysema  Other cardiomyopathy  TIA (transient ischemic attack)  COPD (chronic obstructive pulmonary disease)  Depression  Hypertension  History of cholecystectomy                      PREVIOUS DIAGNOSTIC TESTING:      ECHO  FINDINGS:    STRESS  FINDINGS:    CATHETERIZATION  FINDINGS:    MEDICATIONS  (STANDING):  aspirin  chewable 81 milliGRAM(s) Oral daily  atorvastatin 80 milliGRAM(s) Oral at bedtime  buDESOnide 160 MICROgram(s)/formoterol 4.5 MICROgram(s) Inhaler 2 Puff(s) Inhalation two times a day  cefTRIAXone   IVPB      docusate sodium 100 milliGRAM(s) Oral two times a day  furosemide   Injectable 40 milliGRAM(s) IV Push two times a day  heparin  Injectable 5000 Unit(s) SubCutaneous every 12 hours  influenza   Vaccine 0.5 milliLiter(s) IntraMuscular once  ipratropium    for Nebulization 500 MICROGram(s) Nebulizer every 6 hours  lactulose Syrup 20 Gram(s) Oral daily  LORazepam     Tablet 0.5 milliGRAM(s) Oral two times a day  losartan 50 milliGRAM(s) Oral daily  metoprolol tartrate 50 milliGRAM(s) Oral two times a day  pantoprazole    Tablet 40 milliGRAM(s) Oral before breakfast    MEDICATIONS  (PRN):  ibuprofen  Tablet. 400 milliGRAM(s) Oral two times a day PRN Moderate Pain (4 - 6)      FAMILY HISTORY:  No pertinent family history in first degree relatives      SOCIAL HISTORY:    CIGARETTES:    ALCOHOL:        Vital Signs Last 24 Hrs  T(C): 36.3 (23 Oct 2018 10:57), Max: 36.9 (23 Oct 2018 07:05)  T(F): 97.3 (23 Oct 2018 10:57), Max: 98.4 (23 Oct 2018 07:05)  HR: 64 (23 Oct 2018 13:10) (56 - 86)  BP: 100/53 (23 Oct 2018 13:10) (91/55 - 167/74)  BP(mean): 73 (23 Oct 2018 13:10) (66 - 91)  RR: 21 (23 Oct 2018 13:10) (14 - 28)  SpO2: 95% (23 Oct 2018 10:55) (95% - 100%)            INTERPRETATION OF TELEMETRY:    ECG:    I&O's Detail    22 Oct 2018 07:01  -  23 Oct 2018 07:00  --------------------------------------------------------  IN:  Total IN: 0 mL    OUT:    Voided: 2550 mL  Total OUT: 2550 mL    Total NET: -2550 mL      23 Oct 2018 07:01  -  23 Oct 2018 14:41  --------------------------------------------------------  IN:    IV PiggyBack: 50 mL    Oral Fluid: 240 mL  Total IN: 290 mL    OUT:    Voided: 700 mL  Total OUT: 700 mL    Total NET: -410 mL          LABS:                        14.5   13.65 )-----------( 220      ( 23 Oct 2018 05:30 )             44.7     10    144  |  100  |  37<H>  ----------------------------<  74  3.9   |  25  |  1.2    Ca    9.5      23 Oct 2018 05:30  Mg     1.9     10-23    TPro  7.0  /  Alb  4.2  /  TBili  0.5  /  DBili  x   /  AST  25  /  ALT  20  /  AlkPhos  85  10-23    CARDIAC MARKERS ( 23 Oct 2018 05:30 )  x     / <0.01 ng/mL / x     / x     / 2.8 ng/mL  CARDIAC MARKERS ( 22 Oct 2018 14:05 )  x     / <0.01 ng/mL / x     / x     / x            Urinalysis Basic - ( 22 Oct 2018 17:20 )    Color: Yellow / Appearance: Clear / S.015 / pH: x  Gluc: x / Ketone: Negative  / Bili: Negative / Urobili: 0.2 mg/dL   Blood: x / Protein: Trace mg/dL / Nitrite: Positive   Leuk Esterase: Small / RBC: 1-2 /HPF / WBC 3-5 /HPF   Sq Epi: x / Non Sq Epi: Occasional /HPF / Bacteria: Moderate      I&O's Summary    22 Oct 2018 07:  -  23 Oct 2018 07:00  --------------------------------------------------------  IN: 0 mL / OUT: 2550 mL / NET: -2550 mL    23 Oct 2018 07:01  -  23 Oct 2018 14:41  --------------------------------------------------------  IN: 290 mL / OUT: 700 mL / NET: -410 mL      BNPSerum Pro-Brain Natriuretic Peptide: 3667 pg/mL (10-22 @ 15:00)    RADIOLOGY & ADDITIONAL STUDIES:

## 2018-10-23 NOTE — H&P ADULT - NSHPPHYSICALEXAM_GEN_ALL_CORE
General: NAD, AAO x3  HEENT: No icterus,. Moist mucous membranes  Neck: No JVD noted. Supple  Cardio: S1, S2 noted, RRR. No murmurs  Resp: b/l crackles +  Abdo: Soft, NT, bowel sounds present. No organomegaly  Extremities: No edema noted. Pulses present b/l  Neuro: AAO x3, grossly normal motor strength.  Skin: Dry, no rashes

## 2018-10-23 NOTE — CONSULT NOTE ADULT - SUBJECTIVE AND OBJECTIVE BOX
Patient is a 73y old  Female who presents with a chief complaint of dizzinies and vertigo     HPI: Patient is a known case of HTN , COPD and has been under the care of pulmonologist and cardiologist. Patient started to have SOB after 10 am today. No chest pain , cough , fever or phlegm.  Felt mild headache.  presented for dizzinies in ER was hypotensive HR 52 given 2 liter of NS start to complain of SOB hypoxia cxr congestion   patient was on verapamil 3 times a day instead twice , given lasix felt much better   now feel good   also she was complaining of abd pain dysuria ct abd normal   PAST MEDICAL & SURGICAL HISTORY:  PVD (peripheral vascular disease)  Other emphysema  Other cardiomyopathy  TIA (transient ischemic attack)  COPD (chronic obstructive pulmonary disease)  Depression  Hypertension  History of cholecystectomy    Allergies    albuterol (Unknown)  codeine (Other (Moderate))  Depakote (Unknown)    Intolerances      Family history : no cardiovscular family history   Home Medications:  aspirin 81 mg oral tablet: 1 tab(s) orally once a day (22 Oct 2018 21:39)  atorvastatin 80 mg oral tablet: 1 tab(s) orally once a day (22 Oct 2018 21:39)  ciclopirox 0.77% topical cream: Apply topically to affected area 2 times a day (22 Oct 2018 21:39)  Duexis 800 mg-26.6 mg oral tablet: orally once a day (22 Oct 2018 21:39)  Fish Oil 1200 mg oral capsule: twice a day (22 Oct 2018 21:39)  Metoprolol Tartrate 100 mg oral tablet: 1 tab(s) orally 2 times a day (22 Oct 2018 21:39)  Microzide 12.5 mg oral capsule: 1 cap(s) orally once a day (22 Oct 2018 21:39)  raNITIdine 150 mg oral capsule: 1 cap(s) orally 2 times a day (22 Oct 2018 21:39)  Spiriva 18 mcg inhalation capsule: 1 cap(s) inhaled once a day (22 Oct 2018 21:39)  Symbicort 160 mcg-4.5 mcg/inh inhalation aerosol: 2 puff(s) inhaled 2 times a day (22 Oct 2018 21:39)  valsartan 320 mg oral tablet: 1 tab(s) orally once a day (22 Oct 2018 21:39)  verapamil 40 mg oral tablet: 1 tab(s) orally 3 times a day (22 Oct 2018 21:39)  Xopenex HFA 45 mcg/inh inhalation aerosol: 2 puff(s) inhaled every 4 hours (22 Oct 2018 21:39)    Occupation:  Alochol: Denied  Smoking: Denied  Drug Use: Denied  Marital Status:         ROS: as in HPI; All other systems reviewed are negative    ICU Vital Signs Last 24 Hrs  T(C): 36.9 (23 Oct 2018 07:05), Max: 36.9 (23 Oct 2018 07:05)  T(F): 98.4 (23 Oct 2018 07:05), Max: 98.4 (23 Oct 2018 07:05)  HR: 65 (23 Oct 2018 07:10) (51 - 86)  BP: 138/65 (23 Oct 2018 07:10) (100/49 - 167/74)  BP(mean): 90 (23 Oct 2018 07:10) (87 - 91)  ABP: --  ABP(mean): --  RR: 19 (23 Oct 2018 07:10) (14 - 20)  SpO2: 95% (23 Oct 2018 07:10) (95% - 100%)        Physical Examination:    General: No acute distress.  Alert, oriented, interactive, nonfocal    HEENT: Pupils equal, reactive to light.  Symmetric.    PULM: mild crackles     CVS: Regular rate and rhythm, no murmurs, rubs, or gallops    ABD: Soft, nondistended, nontender, normoactive bowel sounds, no masses    EXT: 0 edema, nontender, no clubbing     SKIN: Warm and well perfused, no rashes noted.    Neurology : no motor or sensory deficit     Musculoskeletal : move all extremety     Lymphatic system: no Palpable node               I&O's Detail    22 Oct 2018 07:  -  23 Oct 2018 07:00  --------------------------------------------------------  IN:  Total IN: 0 mL    OUT:    Voided: 2550 mL  Total OUT: 2550 mL    Total NET: -2550 mL      23 Oct 2018 07:01  -  23 Oct 2018 08:29  --------------------------------------------------------  IN:  Total IN: 0 mL    OUT:    Voided: 350 mL  Total OUT: 350 mL    Total NET: -350 mL            LABS:                        14.5   13.65 )-----------( 220      ( 23 Oct 2018 05:30 )             44.7     23 Oct 2018 05:30    144    |  100    |  37     ----------------------------<  74     3.9     |  25     |  1.2      Ca    9.5        23 Oct 2018 05:30  Mg     1.9       23 Oct 2018 05:30    TPro  7.0    /  Alb  4.2    /  TBili  0.5    /  DBili  x      /  AST  25     /  ALT  20     /  AlkPhos  85     23 Oct 2018 05:30  Amylase x     lipase x          CARDIAC MARKERS ( 23 Oct 2018 05:30 )  x     / <0.01 ng/mL / x     / x     / 2.8 ng/mL  CARDIAC MARKERS ( 22 Oct 2018 14:05 )  x     / <0.01 ng/mL / x     / x     / x          CAPILLARY BLOOD GLUCOSE      POCT Blood Glucose.: 76 mg/dL (22 Oct 2018 14:50)      Urinalysis Basic - ( 22 Oct 2018 17:20 )    Color: Yellow / Appearance: Clear / S.015 / pH: x  Gluc: x / Ketone: Negative  / Bili: Negative / Urobili: 0.2 mg/dL   Blood: x / Protein: Trace mg/dL / Nitrite: Positive   Leuk Esterase: Small / RBC: 1-2 /HPF / WBC 3-5 /HPF   Sq Epi: x / Non Sq Epi: Occasional /HPF / Bacteria: Moderate      Culture    Lactate, Blood: 1.9 mmol/L (10-22-18 @ 14:05)      MEDICATIONS  (STANDING):  aspirin  chewable 81 milliGRAM(s) Oral daily  atorvastatin 80 milliGRAM(s) Oral at bedtime  buDESOnide 160 MICROgram(s)/formoterol 4.5 MICROgram(s) Inhaler 2 Puff(s) Inhalation two times a day  docusate sodium 100 milliGRAM(s) Oral two times a day  furosemide    Tablet 40 milliGRAM(s) Oral daily  heparin  Injectable 5000 Unit(s) SubCutaneous every 12 hours  influenza   Vaccine 0.5 milliLiter(s) IntraMuscular once  ipratropium    for Nebulization 500 MICROGram(s) Nebulizer every 6 hours  lactulose Syrup 20 Gram(s) Oral daily  losartan 50 milliGRAM(s) Oral daily  metoprolol tartrate 100 milliGRAM(s) Oral two times a day  pantoprazole    Tablet 40 milliGRAM(s) Oral before breakfast    MEDICATIONS  (PRN):        RADIOLOGY: ***     CXR: b/l congestion   TLC:  OG:  ET tube:          ECHO:

## 2018-10-23 NOTE — CONSULT NOTE ADULT - ASSESSMENT
IMPRESSION:  ARF secondary Pulmonary edema   CHF   bradycardia symptomatic   UTI       PLAN:    CNS: no sedation     HEENT: miesha    PULMONARY: keep pox >92% Bipap as needed     CARDIOVASCULAR:  echo  cardiology consult   decrease lopressor to 50 Q 12 , losartan , off verapamil   lasix 40 mg Q 12 hrs for 24 hrs   GI: GI prophylaxis.  Feeding     RENAL: follow lytes   Is and OS     INFECTIOUS DISEASE: pan cx rocephine     HEMATOLOGICAL:  DVT prophylaxis.    ENDOCRINE:  Follow up FS.  Insulin protocol if needed.    MUSCULOSKELETAL:  downgrade tele       CRITICAL CARE TIME SPENT: ***

## 2018-10-23 NOTE — H&P ADULT - NSHPREVIEWOFSYSTEMS_GEN_ALL_CORE
REVIEW OF SYSTEMS:    CONSTITUTIONAL: No weakness, fevers or chills  EYES/ENT: No visual changes; + vertigo no  throat pain   NECK: No pain or stiffness  RESPIRATORY: No cough, wheezing, hemoptysis; c/o +  shortness of breath  CARDIOVASCULAR: No chest pain or palpitations  GASTROINTESTINAL: lower abdominal pain. dysuria +  GENITOURINARY: + dysuria no frequency or hematuria  NEUROLOGICAL: No numbness or weakness  SKIN: No itching, rashes

## 2018-10-23 NOTE — H&P ADULT - ASSESSMENT
72 yo Female w/ h/o COPD not on O2, HTN, depression, LVH, TIA presented to ED with c/o SOB and vertigo x 1 day duration    #symptomatic bradycardia  stopped verapamil   decreased dose of lopressor to 50 q12  echo today  cardio recs    #cystitis - add rocephin IV 1 gm  f/u urine cx  f/u blood cx  monitor cbc    #vertigo- likely 2/2 orthostatic? symptomatic bradycardia  symptoms resolved now  f/u neuro recs  pt/rehab    #dvt ppx-lovenox  #full code, from home -lives with

## 2018-10-23 NOTE — H&P ADULT - NSHPLABSRESULTS_GEN_ALL_CORE
LABS:                        14.5   13.65 )-----------( 220      ( 23 Oct 2018 05:30 )             44.7     23 Oct 2018 05:30    144    |  100    |  37     ----------------------------<  74     3.9     |  25     |  1.2      Ca    9.5        23 Oct 2018 05:30  Mg     1.9       23 Oct 2018 05:30    TPro  7.0    /  Alb  4.2    /  TBili  0.5    /  DBili  x      /  AST  25     /  ALT  20     /  AlkPhos  85     23 Oct 2018 05:30    < from: Xray Chest 1 View-PORTABLE IMMEDIATE (10.22.18 @ 14:14) >    Diffuse interstitial opacities which could represent vascular congestion   versus infection.      < end of copied text >    < from: CT Abdomen and Pelvis No Cont (10.22.18 @ 17:59) >    1. No CT evidence for acute abdominopelvic pathology.    2. Copious amounts of stool. Correlate for underlying constipation.    3. Findings compatible with pulmonary edema. Trace bilateral pleural   effusions.    < end of copied text >    < from: CT Angio Neck w/ IV Cont (10.22.18 @ 16:00) >    CTA neck-  1.  Atherosclerotic great vessel origins with likely moderate stenoses of   the left common carotid and subclavian artery origins.  2.  Irregular atheromatous plaque at the left ICA origin with likely   about 60% stenosis.  3.  Patent vertebral arteries patent. Patent right carotid artery.    CTA head-  1.  No major vascularstenosis or occlusion.    Other-  1. 2-cm enhancing mass interposed between the parotid gland and the   sternocleidomastoid muscle, potentially reflecting a parotid lesion.   Recommend ENT consultation on an elective basis.  2. Left apical pleural thickening appears increased since 2011. Extensive   emphysema with bullous changes.    < from: CT Brain Stroke Protocol (10.22.18 @ 14:31) >    No evidence of acute intracranial pathology, no significant changes from   the prior exam.      < end of copied text >

## 2018-10-23 NOTE — H&P ADULT - PMH
COPD (chronic obstructive pulmonary disease)    Depression    Hypertension    Other cardiomyopathy    Other emphysema    PVD (peripheral vascular disease)    TIA (transient ischemic attack)

## 2018-10-23 NOTE — H&P ADULT - ATTENDING COMMENTS
Patient seen and evaluated independently medical resident note reviewed, I agree with plan and management, except as I have noted. Pt also has acute pulmonary edema, continue IV lasix and negative fluid balance, check 2DECHO

## 2018-10-24 LAB
AMYLASE P1 CFR SERPL: 110 U/L — SIGNIFICANT CHANGE UP (ref 25–115)
ANION GAP SERPL CALC-SCNC: 15 MMOL/L — HIGH (ref 7–14)
BASOPHILS # BLD AUTO: 0.03 K/UL — SIGNIFICANT CHANGE UP (ref 0–0.2)
BASOPHILS NFR BLD AUTO: 0.5 % — SIGNIFICANT CHANGE UP (ref 0–1)
BUN SERPL-MCNC: 46 MG/DL — HIGH (ref 10–20)
CALCIUM SERPL-MCNC: 9.3 MG/DL — SIGNIFICANT CHANGE UP (ref 8.5–10.1)
CHLORIDE SERPL-SCNC: 97 MMOL/L — LOW (ref 98–110)
CO2 SERPL-SCNC: 31 MMOL/L — SIGNIFICANT CHANGE UP (ref 17–32)
CREAT SERPL-MCNC: 1.7 MG/DL — HIGH (ref 0.7–1.5)
EOSINOPHIL # BLD AUTO: 0.36 K/UL — SIGNIFICANT CHANGE UP (ref 0–0.7)
EOSINOPHIL NFR BLD AUTO: 5.9 % — SIGNIFICANT CHANGE UP (ref 0–8)
GLUCOSE SERPL-MCNC: 92 MG/DL — SIGNIFICANT CHANGE UP (ref 70–99)
HCT VFR BLD CALC: 40.8 % — SIGNIFICANT CHANGE UP (ref 37–47)
HGB BLD-MCNC: 13.1 G/DL — SIGNIFICANT CHANGE UP (ref 12–16)
IMM GRANULOCYTES NFR BLD AUTO: 0.5 % — HIGH (ref 0.1–0.3)
LIDOCAIN IGE QN: 34 U/L — SIGNIFICANT CHANGE UP (ref 7–60)
LYMPHOCYTES # BLD AUTO: 1.05 K/UL — LOW (ref 1.2–3.4)
LYMPHOCYTES # BLD AUTO: 17.2 % — LOW (ref 20.5–51.1)
MCHC RBC-ENTMCNC: 30.5 PG — SIGNIFICANT CHANGE UP (ref 27–31)
MCHC RBC-ENTMCNC: 32.1 G/DL — SIGNIFICANT CHANGE UP (ref 32–37)
MCV RBC AUTO: 95.1 FL — SIGNIFICANT CHANGE UP (ref 81–99)
MONOCYTES # BLD AUTO: 0.64 K/UL — HIGH (ref 0.1–0.6)
MONOCYTES NFR BLD AUTO: 10.5 % — HIGH (ref 1.7–9.3)
NEUTROPHILS # BLD AUTO: 3.98 K/UL — SIGNIFICANT CHANGE UP (ref 1.4–6.5)
NEUTROPHILS NFR BLD AUTO: 65.4 % — SIGNIFICANT CHANGE UP (ref 42.2–75.2)
PLATELET # BLD AUTO: 193 K/UL — SIGNIFICANT CHANGE UP (ref 130–400)
POTASSIUM SERPL-MCNC: 3.3 MMOL/L — LOW (ref 3.5–5)
POTASSIUM SERPL-SCNC: 3.3 MMOL/L — LOW (ref 3.5–5)
RBC # BLD: 4.29 M/UL — SIGNIFICANT CHANGE UP (ref 4.2–5.4)
RBC # FLD: 14.3 % — SIGNIFICANT CHANGE UP (ref 11.5–14.5)
SODIUM SERPL-SCNC: 143 MMOL/L — SIGNIFICANT CHANGE UP (ref 135–146)
WBC # BLD: 6.09 K/UL — SIGNIFICANT CHANGE UP (ref 4.8–10.8)
WBC # FLD AUTO: 6.09 K/UL — SIGNIFICANT CHANGE UP (ref 4.8–10.8)

## 2018-10-24 RX ORDER — OXYCODONE AND ACETAMINOPHEN 5; 325 MG/1; MG/1
1 TABLET ORAL EVERY 6 HOURS
Qty: 0 | Refills: 0 | Status: DISCONTINUED | OUTPATIENT
Start: 2018-10-24 | End: 2018-10-24

## 2018-10-24 RX ORDER — SIMETHICONE 80 MG/1
80 TABLET, CHEWABLE ORAL THREE TIMES A DAY
Qty: 0 | Refills: 0 | Status: DISCONTINUED | OUTPATIENT
Start: 2018-10-24 | End: 2018-10-26

## 2018-10-24 RX ORDER — PANTOPRAZOLE SODIUM 20 MG/1
40 TABLET, DELAYED RELEASE ORAL ONCE
Qty: 0 | Refills: 0 | Status: COMPLETED | OUTPATIENT
Start: 2018-10-24 | End: 2018-10-24

## 2018-10-24 RX ORDER — ONDANSETRON 8 MG/1
4 TABLET, FILM COATED ORAL ONCE
Qty: 0 | Refills: 0 | Status: COMPLETED | OUTPATIENT
Start: 2018-10-24 | End: 2018-10-24

## 2018-10-24 RX ADMIN — ONDANSETRON 4 MILLIGRAM(S): 8 TABLET, FILM COATED ORAL at 08:12

## 2018-10-24 RX ADMIN — Medication 500 MICROGRAM(S): at 19:20

## 2018-10-24 RX ADMIN — Medication 40 MILLIGRAM(S): at 05:31

## 2018-10-24 RX ADMIN — Medication 30 MILLILITER(S): at 17:16

## 2018-10-24 RX ADMIN — CEFTRIAXONE 100 GRAM(S): 500 INJECTION, POWDER, FOR SOLUTION INTRAMUSCULAR; INTRAVENOUS at 10:43

## 2018-10-24 RX ADMIN — PANTOPRAZOLE SODIUM 40 MILLIGRAM(S): 20 TABLET, DELAYED RELEASE ORAL at 12:28

## 2018-10-24 RX ADMIN — ATORVASTATIN CALCIUM 80 MILLIGRAM(S): 80 TABLET, FILM COATED ORAL at 21:42

## 2018-10-24 RX ADMIN — ONDANSETRON 4 MILLIGRAM(S): 8 TABLET, FILM COATED ORAL at 14:44

## 2018-10-24 RX ADMIN — Medication 650 MILLIGRAM(S): at 15:39

## 2018-10-24 RX ADMIN — Medication 0.5 MILLIGRAM(S): at 17:18

## 2018-10-24 RX ADMIN — Medication 650 MILLIGRAM(S): at 16:37

## 2018-10-24 RX ADMIN — Medication 500 MICROGRAM(S): at 07:59

## 2018-10-24 RX ADMIN — SIMETHICONE 80 MILLIGRAM(S): 80 TABLET, CHEWABLE ORAL at 21:42

## 2018-10-24 RX ADMIN — Medication 50 MILLIGRAM(S): at 05:31

## 2018-10-24 RX ADMIN — BUDESONIDE AND FORMOTEROL FUMARATE DIHYDRATE 2 PUFF(S): 160; 4.5 AEROSOL RESPIRATORY (INHALATION) at 20:28

## 2018-10-24 RX ADMIN — PANTOPRAZOLE SODIUM 40 MILLIGRAM(S): 20 TABLET, DELAYED RELEASE ORAL at 07:38

## 2018-10-24 RX ADMIN — Medication 50 MILLIGRAM(S): at 17:18

## 2018-10-24 RX ADMIN — BUDESONIDE AND FORMOTEROL FUMARATE DIHYDRATE 2 PUFF(S): 160; 4.5 AEROSOL RESPIRATORY (INHALATION) at 07:38

## 2018-10-24 RX ADMIN — HEPARIN SODIUM 5000 UNIT(S): 5000 INJECTION INTRAVENOUS; SUBCUTANEOUS at 05:31

## 2018-10-24 RX ADMIN — Medication 0.5 MILLIGRAM(S): at 05:57

## 2018-10-24 RX ADMIN — HEPARIN SODIUM 5000 UNIT(S): 5000 INJECTION INTRAVENOUS; SUBCUTANEOUS at 17:19

## 2018-10-24 RX ADMIN — Medication 30 MILLILITER(S): at 11:34

## 2018-10-24 RX ADMIN — Medication 50 MILLIGRAM(S): at 07:37

## 2018-10-24 RX ADMIN — Medication 500 MICROGRAM(S): at 13:58

## 2018-10-24 RX ADMIN — SIMETHICONE 80 MILLIGRAM(S): 80 TABLET, CHEWABLE ORAL at 14:44

## 2018-10-24 RX ADMIN — LOSARTAN POTASSIUM 50 MILLIGRAM(S): 100 TABLET, FILM COATED ORAL at 05:30

## 2018-10-24 RX ADMIN — Medication 81 MILLIGRAM(S): at 11:34

## 2018-10-24 RX ADMIN — LACTULOSE 20 GRAM(S): 10 SOLUTION ORAL at 11:34

## 2018-10-24 NOTE — SWALLOW BEDSIDE ASSESSMENT ADULT - ASR SWALLOW RECOMMEND DIAG
recommend SLP f/u to determine necessity of instrumental assessment. monitor for changes in respiratory status as it may affect ability to safely consume PO diet

## 2018-10-24 NOTE — SWALLOW BEDSIDE ASSESSMENT ADULT - ADDITIONAL RECOMMENDATIONS
Education provided re: recommended oral care regimen for both dentures and oral cavity. Left with written recommendations bedside. Pt demonstrated return understanding

## 2018-10-24 NOTE — SWALLOW BEDSIDE ASSESSMENT ADULT - COMMENTS
Noted with effortful mastication with labored open mouth breathing. Pt consumed 2x small bites and refused subsequent trials reporting feeling "too tired from that last bite" pt reported preference for softer solid

## 2018-10-24 NOTE — SWALLOW BEDSIDE ASSESSMENT ADULT - PHARYNGEAL PHASE
Within functional limits Decreased laryngeal elevation/(suspected) Decreased laryngeal elevation/(suspected upon palpation)

## 2018-10-24 NOTE — PROGRESS NOTE ADULT - SUBJECTIVE AND OBJECTIVE BOX
Patient is a 73y old  Female who presents with a chief complaint of vertigo , sob (23 Oct 2018 14:41)      T(F): 96.8 (10-24-18 @ 07:00), Max: 98.3 (10-23-18 @ 15:01)  HR: 67 (10-23-18 @ 23:28)  BP: 144/66 (10-23-18 @ 23:28)  RR: 18 (10-24-18 @ 07:00)  SpO2: 94% (10-23-18 @ 23:28) (94% - 96%)    PHYSICAL EXAM:  GENERAL: NAD, well-groomed, well-developed  HEAD:  Atraumatic, Normocephalic  EYES: EOMI, PERRLA, conjunctiva and sclera clear  ENMT: No tonsillar erythema, exudates, or enlargement; Moist mucous membranes, Good dentition, No lesions  NECK: Supple, No JVD, Normal thyroid  NERVOUS SYSTEM:  Alert & Oriented X3,  Motor Strength 5/5 B/L upper and lower extremities  CHEST/LUNG: Clear to percussion bilaterally; No rales, rhonchi, wheezing, or rubs  HEART: Regular rate and rhythm; No murmurs, rubs, or gallops  ABDOMEN: Soft, Nontender, Nondistended; Bowel sounds present  EXTREMITIES:   No clubbing, cyanosis, or edema  LYMPH: No lymphadenopathy noted  SKIN: No rashes or lesions    labs  10-24    143  |  97<L>  |  46<H>  ----------------------------<  92  3.3<L>   |  31  |  1.7<H>    Ca    9.3      24 Oct 2018 05:26  Mg     1.9     10-23    TPro  7.0  /  Alb  4.2  /  TBili  0.5  /  DBili  x   /  AST  25  /  ALT  20  /  AlkPhos  85  10-23                          13.1   6.09  )-----------( 193      ( 24 Oct 2018 05:26 )             40.8               acetaminophen   Tablet .. 650 milliGRAM(s) Oral every 6 hours PRN  aspirin  chewable 81 milliGRAM(s) Oral daily  atorvastatin 80 milliGRAM(s) Oral at bedtime  buDESOnide 160 MICROgram(s)/formoterol 4.5 MICROgram(s) Inhaler 2 Puff(s) Inhalation two times a day  cefTRIAXone   IVPB      cefTRIAXone   IVPB 1 Gram(s) IV Intermittent every 24 hours  docusate sodium 100 milliGRAM(s) Oral two times a day  furosemide   Injectable 40 milliGRAM(s) IV Push two times a day  heparin  Injectable 5000 Unit(s) SubCutaneous every 12 hours  influenza   Vaccine 0.5 milliLiter(s) IntraMuscular once  ipratropium    for Nebulization 500 MICROGram(s) Nebulizer every 6 hours  lactulose Syrup 20 Gram(s) Oral daily  LORazepam     Tablet 0.5 milliGRAM(s) Oral two times a day  losartan 50 milliGRAM(s) Oral daily  metoprolol tartrate 50 milliGRAM(s) Oral two times a day  ondansetron Injectable 4 milliGRAM(s) IV Push once  pantoprazole    Tablet 40 milliGRAM(s) Oral before breakfast  pantoprazole  Injectable 40 milliGRAM(s) IV Push once

## 2018-10-24 NOTE — PROGRESS NOTE ADULT - ASSESSMENT
IMPRESSION:  ARF secondary Pulmonary edema   CHF   bradycardia symptomatic   UTI   constipation       PLAN:    CNS: no sedation     HEENT: miesha    PULMONARY: keep pox >92% Bipap as needed     CARDIOVASCULAR:  echo  cardiology consult   decrease lopressor to 50 Q 12 , losartan , off verapamil   hold lasix for now      GI: GI prophylaxis.  Feeding  Do RUQ sono  Gi evaluation   stool softener   do KUB     RENAL: follow lytes   Is and OS     INFECTIOUS DISEASE: pan cx rocephine     HEMATOLOGICAL:  DVT prophylaxis.    ENDOCRINE:  Follow up FS.  Insulin protocol if needed.    MUSCULOSKELETAL:  downgrade tele       CRITICAL CARE TIME SPENT: ***

## 2018-10-24 NOTE — PHYSICAL THERAPY INITIAL EVALUATION ADULT - IMPAIRMENTS CONTRIBUTING TO GAIT DEVIATIONS, PT EVAL
decreased endurance/impaired balance/narrow base of support/impaired postural control/decreased strength

## 2018-10-24 NOTE — CONSULT NOTE ADULT - SUBJECTIVE AND OBJECTIVE BOX
IMPRESSION: Rehab of 72 y/o  f  rehab  for OA gd      PRECAUTIONS: [ x ] Cardiac  [ x ] Respiratory  [  ] Seizures [  ] Contact Isolation  [  ] Droplet Isolation  [ fall ] Other    Weight Bearing Status:     RECOMMENDATION:    Out of Bed to Chair     DVT/Decubiti Prophylaxis    REHAB PLAN:     [ xx  ] Bedside P/T 3-5 times a week   [   ]   Bedside O/T  2-3 times a week             [   ] No Rehab Therapy Indicated                   [   ]  Speech Therapy   Conditioning/ROM                                    ADL  Bed Mobility                                               Conditioning/ROM  Transfers                                                     Bed Mobility  Sitting /Standing Balance                         Transfers                                        Gait Training                                               Sitting/Standing Balance  Stair Training [   ]Applicable                    Home equipment Eval                                                                        Splinting  [   ] Only      GOALS:   ADL   [  x]   Independent                    Transfers  [ x  ] Independent                          Ambulation  [  x ] Independent     [x    ] With device                            [   ]  CG                                                         [   ]  CG                                                                  [   ] CG                            [    ] Min A                                                   [   ] Min A                                                              [   ] Min  A          DISCHARGE PLAN:   [   ]  Good candidate for Intensive Rehabilitation/Hospital based-[facility]A [facility]                                             Will tolerate 3hrs Intensive Rehab Daily                                       [  xx  ]  Short Term Rehab in Skilled Nursing Facility                                       [    ]  Home with Outpatient or VN services                                         [    ]  Possible Candidate for Intensive Hospital based Rehab

## 2018-10-24 NOTE — SWALLOW BEDSIDE ASSESSMENT ADULT - ORAL PHASE
Within functional limits Delayed oral transit time (improved compared to regular consistency trials)/Delayed oral transit time

## 2018-10-24 NOTE — SWALLOW BEDSIDE ASSESSMENT ADULT - SWALLOW EVAL: DIAGNOSIS
Pt presents with poor activity tolerance for consumption of regular consistency meals. Oral phase of swallowing characterized by prolonged and effortful mastication with labored open mouth breathing with regular consistency trials. Suspected decreased hyolaryngeal elevation upon palpation. Pt reports fatigue after PO trials of regular. Improved timiness and efficiency of bolus manipulation/propulsion with softer solids. Pt is a risk for weight loss and malnutrition due to poor activity tolerance for PO intake on current diet. Recommend diet modification to Dysphagia Diet II mechanical soft consistency with ground meat for comfort and safety and to support optimal intake to meet nutritional needs.

## 2018-10-24 NOTE — SWALLOW BEDSIDE ASSESSMENT ADULT - SLP PERTINENT HISTORY OF CURRENT PROBLEM
Pt reports decreased motivation for PO intake due to abdominal pain. Additional pt reports of fatigue during PO intake and recently feeling "too tired to finish a whole meal"

## 2018-10-24 NOTE — SWALLOW BEDSIDE ASSESSMENT ADULT - ASR SWALLOW ASPIRATION MONITOR
oral hygiene/cough/upper respiratory infection/pneumonia/fever/throat clearing/change of breathing pattern/position upright (89Y)/gurgly voice

## 2018-10-24 NOTE — CONSULT NOTE ADULT - SUBJECTIVE AND OBJECTIVE BOX
Pt is a 73 yoa female pmh of COPD not on home O2, HTN, Depression, LVH, TIA 20 years ago, hyperlipidemia, states she had 20/10 sharp diffuse abdominal pain when she got up from her recliner when watching television Monday morning. Pt states the pain was so severe she could not walk and was BIBA Monday. Pt was having SOB,dry cough,dizziness. as well on Monday. Pt states is having abdominal soreness from ultrasound probe today in her Epigastric area. Pt endorses dry heaving vomitting today morning and was experiencing dry heave vomitus after abdominal exam today. Pt states her last bowel movement was today it was formed not loose. Pt states she had a bowel movement yesterday as well formed not loose. Pt is currently receiving lactulose syrup. Pt has no nausea is currently on Zofran. Pt has dry heave vomitting,denies hemetemesis,dysphagia, melena, abdominal pain. Pts last Colonoscopy was 5 years ago as per patient there was one benign polyp noted.    PAST MEDICAL & SURGICAL HISTORY:  PVD (peripheral vascular disease)  Other emphysema  Other cardiomyopathy  TIA (transient ischemic attack)  COPD (chronic obstructive pulmonary disease)  Depression  Hypertension  History of cholecystectomy 20 years ago  Torn Meniscus repair 25 years ago    MEDICATIONS  (STANDING):  aspirin  chewable 81 milliGRAM(s) Oral daily  atorvastatin 80 milliGRAM(s) Oral at bedtime  buDESOnide 160 MICROgram(s)/formoterol 4.5 MICROgram(s) Inhaler 2 Puff(s) Inhalation two times a day  cefTRIAXone   IVPB      cefTRIAXone   IVPB 1 Gram(s) IV Intermittent every 24 hours  docusate sodium 100 milliGRAM(s) Oral two times a day  heparin  Injectable 5000 Unit(s) SubCutaneous every 12 hours  influenza   Vaccine 0.5 milliLiter(s) IntraMuscular once  ipratropium    for Nebulization 500 MICROGram(s) Nebulizer every 6 hours  lactulose Syrup 20 Gram(s) Oral daily  LORazepam     Tablet 0.5 milliGRAM(s) Oral two times a day  losartan 50 milliGRAM(s) Oral daily  metoprolol tartrate 50 milliGRAM(s) Oral two times a day  ondansetron Injectable 4 milliGRAM(s) IV Push once  pantoprazole    Tablet 40 milliGRAM(s) Oral before breakfast  simethicone 80 milliGRAM(s) Chew three times a day    MEDICATIONS  (PRN):  acetaminophen   Tablet .. 650 milliGRAM(s) Oral every 6 hours PRN Mild Pain (1 - 3)  aluminum hydroxide/magnesium hydroxide/simethicone Suspension 30 milliLiter(s) Oral every 4 hours PRN Dyspepsia    Vital Signs Last 24 Hrs  T(C): 37.1 (24 Oct 2018 13:00), Max: 37.1 (24 Oct 2018 13:00)  T(F): 98.7 (24 Oct 2018 13:00), Max: 98.7 (24 Oct 2018 13:00)  HR: 86 (24 Oct 2018 08:31) (63 - 86)  BP: 125/59 (24 Oct 2018 07:11) (100/54 - 144/66)  BP(mean): 85 (24 Oct 2018 07:11) (74 - 85)  RR: 17 (24 Oct 2018 13:00) (16 - 38)  SpO2: 96% (24 Oct 2018 08:31) (94% - 96%)    Review of Systems  General:  Denies Fatigue, Denies Fever, Denies Weakness ,  HEENT: Denies Trouble Swallowing ,Denies  Sore Throat , ,Denies Dizziness    Cardio: Denies  Chest Pain , Palpitations    Respiratory: Denies worsening of SOB, Denies Cough  Abdomen: See detailed HPI    Physical Exam  Gen: NAD  HEENT: NC/AT, Mucosal Membranes  Cardio: S1/S2 No S3/S4, Regular  Resp: CTA B/L  Abdomen: + active bowel sounds, Soft, Tenderness to palpation LLQ  Neuro: AAOx3,   Extremities: FROM x 4                          13.1   6.09  )-----------( 193      ( 24 Oct 2018 05:26 )             40.8     24 Oct 2018 05:26        143    |  97     |  46     ----------------------------<  92     3.3     |  31     |  1.7      Ca    9.3        24 Oct 2018 05:26  Mg     1.9       23 Oct 2018 05:30    TPro  7.0    /  Alb  4.2    /  TBili  0.5    /  DBili  x      /  AST  25     /  ALT  20     /  AlkPhos  85     / Amylase x      /Lipase x      23 Oct 2018 05:30    < from: CT Abdomen and Pelvis No Cont (10.22.18 @ 17:59) >  EXAM:  CT ABDOMEN AND PELVIS            PROCEDURE DATE:  10/22/2018            INTERPRETATION:  CLINICAL INDICATION: Abdominal pain    TECHNIQUE:   CT abdomen and pelvis without intravenous contrast.  Intravenous contrast: None.  Oral contrast was not administered.     COMPARISON: CT of the abdomen dated 8/25/2013.     INTERPRETATION:  Evaluation of the abdominal viscera is limited without intravenous   contrast.    LUNG BASES/PLEURA: Trace bilateral pleural effusions.  There is bibasilar   interlobular septal thickening and groundglass opacities.  HEART: There is mild cardiomegaly. Aortic valvular and mitral annular   calcifications. Aortic and coronary vascular calcifications.    LIVER: Within normal limits.   BILIARY SYSTEM: There is no dilatation of the common bile duct.  GALLBLADDER: Prior cholecystectomy.     PANCREAS: Within normal limits.   SPLEEN: Within normal limits.   ADRENALS: Within normal limits.    KIDNEYS/URETERS: Multiple renal hypodensities which are too small to   characterize. The kidneys are otherwise within normal limits. No   hydronephrosis or stones.     BOWEL/MESENTERY: Copious amount of stool within the large bowel. No bowel   obstruction or wall thickening. Diverticulosis is present, without   evidence ofdiverticulitis.  The appendix is normal.     URINARY BLADDER: Excreted contrast within the bladder.  REPRODUCTIVE ORGANS: No pelvic masses.     PERITONEUM/RETROPERITONEUM: No free air. No free or loculated fluid.   LYMPH NODES: No abdominal or pelviclymphadenopathy.   VESSELS: Atherosclerotic calcifications are present. Small infrarenal   aortic  aneurysm measuring 1.6 cm.    BONES: There are degenerative changes of the spine.  SOFT TISSUES: Within normal limits.      IMPRESSION:  1. No CT evidence for acute abdominopelvic pathology.    2. Copious amounts of stool. Correlate for underlying constipation.    3. Findings compatible with pulmonary edema. Trace bilateral pleural   effusions.        < from: Xray Abdomen 1 View PORTABLE -Urgent (10.24.18 @ 12:23) >  EXAM:  XR ABDOMEN PORTABLE URGENT 1V            PROCEDURE DATE:  10/24/2018            INTERPRETATION:  Clinical History / Reason for exam: Abdominal pain    Supine views of the abdomen are provided for review and compared to   abdominal pelvic CT dated October 22, 2018.    There is no evidence of obstruction. Telemetry leads overlie patient.   There are surgical clips in right upper quadrant. There are degenerative   changes of the visualized skeleton.    Impression    No obstruction

## 2018-10-24 NOTE — PHYSICAL THERAPY INITIAL EVALUATION ADULT - GENERAL OBSERVATIONS, REHAB EVAL
13:30-14:00 Chart reviewed. Pt encountered semireclined in bed, may be seen by Physical Therapist as confirmed with Nurse. Patient denied pain at rest but "stomach is sore after test'; +tele; +Primafit; +O2 via NC

## 2018-10-24 NOTE — PHYSICAL THERAPY INITIAL EVALUATION ADULT - GAIT DEVIATIONS NOTED, PT EVAL
decreased katelyn/decreased step length/decreased weight-shifting ability/stooped posture, dec heel strike/pushoff

## 2018-10-24 NOTE — PROGRESS NOTE ADULT - SUBJECTIVE AND OBJECTIVE BOX
Patient is a 73y old  Female who presents with a chief complaint of vertigo , sob (24 Oct 2018 08:06)      Over Night Events:  Patient seen and examined over all breathing wise feel better   complaining of NX      ROS:  See HPI    PHYSICAL EXAM    ICU Vital Signs Last 24 Hrs  T(C): 36 (24 Oct 2018 07:00), Max: 36.8 (23 Oct 2018 15:01)  T(F): 96.8 (24 Oct 2018 07:00), Max: 98.3 (23 Oct 2018 15:01)  HR: 73 (24 Oct 2018 07:11) (59 - 73)  BP: 125/59 (24 Oct 2018 07:11) (91/55 - 144/66)  BP(mean): 85 (24 Oct 2018 07:11) (66 - 85)  ABP: --  ABP(mean): --  RR: 16 (24 Oct 2018 07:11) (14 - 38)  SpO2: 96% (24 Oct 2018 07:11) (94% - 96%)      General: Aox3   HEENT:        miesha        Lymph Nodes: NO cervical LN   Lungs: Bilateral BS  Cardiovascular: Regular   Abdomen: Soft, Positive BS  Extremities: No clubbing   Skin: warm   Neurological: no motor deficit   Musculoskeletal: move all ext     I&O's Detail    23 Oct 2018 07:01  -  24 Oct 2018 07:00  --------------------------------------------------------  IN:    IV PiggyBack: 50 mL    Oral Fluid: 540 mL  Total IN: 590 mL    OUT:    Voided: 700 mL  Total OUT: 700 mL    Total NET: -110 mL      24 Oct 2018 07:01  -  24 Oct 2018 08:32  --------------------------------------------------------  IN:  Total IN: 0 mL    OUT:    Emesis: 100 mL    Voided: 700 mL  Total OUT: 800 mL    Total NET: -800 mL          LABS:                          13.1   6.09  )-----------( 193      ( 24 Oct 2018 05:26 )             40.8         24 Oct 2018 05:26    143    |  97     |  46     ----------------------------<  92     3.3     |  31     |  1.7      Ca    9.3        24 Oct 2018 05:26  Mg     1.9       23 Oct 2018 05:30                                                                                      Urinalysis Basic - ( 22 Oct 2018 17:20 )    Color: Yellow / Appearance: Clear / S.015 / pH: x  Gluc: x / Ketone: Negative  / Bili: Negative / Urobili: 0.2 mg/dL   Blood: x / Protein: Trace mg/dL / Nitrite: Positive   Leuk Esterase: Small / RBC: 1-2 /HPF / WBC 3-5 /HPF   Sq Epi: x / Non Sq Epi: Occasional /HPF / Bacteria: Moderate        Lactate, Blood: 1.9 mmol/L (10-22-18 @ 14:05)    CARDIAC MARKERS ( 23 Oct 2018 05:30 )  x     / <0.01 ng/mL / x     / x     / 2.8 ng/mL  CARDIAC MARKERS ( 22 Oct 2018 14:05 )  x     / <0.01 ng/mL / x     / x     / x                                                                                                                                                 MEDICATIONS  (STANDING):  aspirin  chewable 81 milliGRAM(s) Oral daily  atorvastatin 80 milliGRAM(s) Oral at bedtime  buDESOnide 160 MICROgram(s)/formoterol 4.5 MICROgram(s) Inhaler 2 Puff(s) Inhalation two times a day  cefTRIAXone   IVPB      cefTRIAXone   IVPB 1 Gram(s) IV Intermittent every 24 hours  docusate sodium 100 milliGRAM(s) Oral two times a day  furosemide   Injectable 40 milliGRAM(s) IV Push two times a day  heparin  Injectable 5000 Unit(s) SubCutaneous every 12 hours  influenza   Vaccine 0.5 milliLiter(s) IntraMuscular once  ipratropium    for Nebulization 500 MICROGram(s) Nebulizer every 6 hours  lactulose Syrup 20 Gram(s) Oral daily  LORazepam     Tablet 0.5 milliGRAM(s) Oral two times a day  losartan 50 milliGRAM(s) Oral daily  metoprolol tartrate 50 milliGRAM(s) Oral two times a day  pantoprazole    Tablet 40 milliGRAM(s) Oral before breakfast  pantoprazole  Injectable 40 milliGRAM(s) IV Push once    MEDICATIONS  (PRN):  acetaminophen   Tablet .. 650 milliGRAM(s) Oral every 6 hours PRN Mild Pain (1 - 3)          Xrays:  TLC:  OG:  ET tube:                                                                                    decrease b/l opacity    ECHO:

## 2018-10-24 NOTE — CONSULT NOTE ADULT - ASSESSMENT
Pt is a 73 yoa female pmh of COPD not on home O2, HTN, Depression, LVH, TIA 20 years ago, hyperlipidemia, states she had 20/10 sharp diffuse abdominal pain when she got up from her recliner when watching television Monday morning. Pt states the pain was so severe she could not walk and was BIBA Monday. Pt was having SOB,dry cough,dizziness. as well on Monday. Pt states is having abdominal soreness from ultrasound probe today in her Epigastric area. Pt endorses dry heaving vomitting today morning and was experiencing dry heave vomitus after abdominal exam today. Pt states her last bowel movement was today it was formed not loose. Pt states she had a bowel movement yesterday as well formed not loose. Pt is currently receiving lactulose syrup. Pt has no nausea is currently on Zofran. Pt has dry heave vomitting,denies hemetemesis,dysphagia, melena, abdominal pain.    Recc  Will discuss with attending  Continue Zofran  Continue Lactulose

## 2018-10-24 NOTE — PROGRESS NOTE ADULT - ASSESSMENT
74 yo Female w/ h/o COPD not on O2, HTN, depression, LVH, TIA presented to ED with c/o SOB and vertigo x 1 day duration    #episode of Hypotension and bradycardia in ED -responded to IVF   h/o hypertrophic cardiomyopathy ?  stopped verapamil   decreased dose of lopressor to 50 q12  echo results pending  cardio recs    #zainab - lasix stopped  f/u bmp am    #c/o nausea and 1 episode of vomiting -   will get RUQ sono  GI consult   hob elevation 45 degrees to avoid aspiration  protonix 40 mg      # CXR concern for increasing Right basilar opacity -   patient on rocephin   F/U PAN CULTURE  SPEECH AND SWALLOW    #cystitis -  rocephin IV 1 gm  f/u urine cx  f/u blood cx  monitor cbc    #vertigo- likely 2/2 orthostatic?  symptoms resolved now  f/u neuro recs  pt/rehab    #dvt ppx-lovenox  #full code, from home -lives with

## 2018-10-24 NOTE — PROGRESS NOTE ADULT - SUBJECTIVE AND OBJECTIVE BOX
SHAUN COATES  73y  Female  HPI:74 yo Female w/ h/o COPD not on O2, HTN, depression, LVH, TIA presented with c/o sudden onset vertigo at 10 am this morning when she was watching TV in recliner. She had difficulty moving around after that as she had to hold on to things. It was associated with severe frontal headache, lower abdominal pain, nausea. Since then symptoms have been persistent. She also has been having increased SOB on exertion and dry cough worse from baseline for last couple of days. On presentation to ED, she is AAO*3, BP on presentation 70/50--> improved to 117/78 with 1 L IVF bolus, HR -44--> sinus stella w/ PACs on EKG. Stroke code called and CT head, at angio H and N ordered which were reported negative .       Interval history: c/o nausea a/w 1 episode of non bloody non bilious vomiting , also c/o generalized abdominal pain.Hemodynamically stable . Had 1 large Bowel movement yesterday     Vital Signs Last 24 Hrs  T(C): 36.9 (24 Oct 2018 09:00), Max: 36.9 (24 Oct 2018 09:00)  T(F): 98.5 (24 Oct 2018 09:00), Max: 98.5 (24 Oct 2018 09:00)  HR: 86 (24 Oct 2018 08:31) (63 - 86)  BP: 125/59 (24 Oct 2018 07:11) (100/53 - 144/66)  BP(mean): 85 (24 Oct 2018 07:11) (73 - 85)  RR: 17 (24 Oct 2018 09:00) (16 - 38)  SpO2: 96% (24 Oct 2018 08:31) (94% - 96%)    PHYSICAL EXAM:  GENERAL: NAD, well-groomed, well-developed  HEENT - NC/AT, pupils equal and reactive to light,  ; Moist mucous membranes  NECK: Supple, No JVD  CHEST/LUNG: Clear to auscultation bilaterally; No rales, rhonchi, wheezing  HEART: Regular rate and rhythm;   ABDOMEN: Soft, Nontender, Nondistended; Bowel sounds present  EXTREMITIES:  2+ Peripheral Pulses, No clubbing, cyanosis, or edema  NEURO:  No Focal deficits, sensory and motor intact      Consultant(s) Notes Reviewed:  [x ] YES  [ ] NO  Care Discussed with Consultants/Other Providers [ x] YES  [ ] NO    LABS:  LABS:                        13.1   6.09  )-----------( 193      ( 24 Oct 2018 05:26 )             40.8     24 Oct 2018 05:26    143    |  97     |  46     ----------------------------<  92     3.3     |  31     |  1.7      Ca    9.3        24 Oct 2018 05:26        LIVER FUNCTIONS - ( 23 Oct 2018 05:30 )  Alb: 4.2 g/dL / Pro: 7.0 g/dL / ALK PHOS: 85 U/L / ALT: 20 U/L / AST: 25 U/L / GGT: x                 RADIOLOGY & ADDITIONAL TESTS:  < from: Xray Chest 1 View- PORTABLE-Routine (10.24.18 @ 06:53) >   Increasing right base opacity. Stable biapical   pleural thickening versus scarring.    < end of copied text >    Imaging Personally Reviewed:  [x ] YES  [ ] NO  MedsMEDICATIONS  (STANDING):  aspirin  chewable 81 milliGRAM(s) Oral daily  atorvastatin 80 milliGRAM(s) Oral at bedtime  buDESOnide 160 MICROgram(s)/formoterol 4.5 MICROgram(s) Inhaler 2 Puff(s) Inhalation two times a day  cefTRIAXone   IVPB      cefTRIAXone   IVPB 1 Gram(s) IV Intermittent every 24 hours  docusate sodium 100 milliGRAM(s) Oral two times a day  heparin  Injectable 5000 Unit(s) SubCutaneous every 12 hours  influenza   Vaccine 0.5 milliLiter(s) IntraMuscular once  ipratropium    for Nebulization 500 MICROGram(s) Nebulizer every 6 hours  lactulose Syrup 20 Gram(s) Oral daily  LORazepam     Tablet 0.5 milliGRAM(s) Oral two times a day  losartan 50 milliGRAM(s) Oral daily  metoprolol tartrate 50 milliGRAM(s) Oral two times a day  pantoprazole    Tablet 40 milliGRAM(s) Oral before breakfast  pantoprazole  Injectable 40 milliGRAM(s) IV Push once    MEDICATIONS  (PRN):  acetaminophen   Tablet .. 650 milliGRAM(s) Oral every 6 hours PRN Mild Pain (1 - 3)

## 2018-10-25 DIAGNOSIS — K52.9 NONINFECTIVE GASTROENTERITIS AND COLITIS, UNSPECIFIED: ICD-10-CM

## 2018-10-25 LAB
ALBUMIN SERPL ELPH-MCNC: 3.9 G/DL — SIGNIFICANT CHANGE UP (ref 3.5–5.2)
ALP SERPL-CCNC: 78 U/L — SIGNIFICANT CHANGE UP (ref 30–115)
ALT FLD-CCNC: 17 U/L — SIGNIFICANT CHANGE UP (ref 0–41)
ANION GAP SERPL CALC-SCNC: 13 MMOL/L — SIGNIFICANT CHANGE UP (ref 7–14)
AST SERPL-CCNC: 22 U/L — SIGNIFICANT CHANGE UP (ref 0–41)
BASOPHILS # BLD AUTO: 0.03 K/UL — SIGNIFICANT CHANGE UP (ref 0–0.2)
BASOPHILS NFR BLD AUTO: 0.5 % — SIGNIFICANT CHANGE UP (ref 0–1)
BILIRUB SERPL-MCNC: 0.5 MG/DL — SIGNIFICANT CHANGE UP (ref 0.2–1.2)
BUN SERPL-MCNC: 41 MG/DL — HIGH (ref 10–20)
CALCIUM SERPL-MCNC: 9.4 MG/DL — SIGNIFICANT CHANGE UP (ref 8.5–10.1)
CHLORIDE SERPL-SCNC: 95 MMOL/L — LOW (ref 98–110)
CK MB CFR SERPL CALC: 3 NG/ML — SIGNIFICANT CHANGE UP (ref 0.6–6.3)
CK SERPL-CCNC: 63 U/L — SIGNIFICANT CHANGE UP (ref 0–225)
CO2 SERPL-SCNC: 33 MMOL/L — HIGH (ref 17–32)
CREAT SERPL-MCNC: 1.2 MG/DL — SIGNIFICANT CHANGE UP (ref 0.7–1.5)
EOSINOPHIL # BLD AUTO: 0.43 K/UL — SIGNIFICANT CHANGE UP (ref 0–0.7)
EOSINOPHIL NFR BLD AUTO: 6.5 % — SIGNIFICANT CHANGE UP (ref 0–8)
GLUCOSE SERPL-MCNC: 89 MG/DL — SIGNIFICANT CHANGE UP (ref 70–99)
HCT VFR BLD CALC: 41 % — SIGNIFICANT CHANGE UP (ref 37–47)
HGB BLD-MCNC: 13.3 G/DL — SIGNIFICANT CHANGE UP (ref 12–16)
IMM GRANULOCYTES NFR BLD AUTO: 0.5 % — HIGH (ref 0.1–0.3)
LYMPHOCYTES # BLD AUTO: 0.93 K/UL — LOW (ref 1.2–3.4)
LYMPHOCYTES # BLD AUTO: 14 % — LOW (ref 20.5–51.1)
MCHC RBC-ENTMCNC: 30.5 PG — SIGNIFICANT CHANGE UP (ref 27–31)
MCHC RBC-ENTMCNC: 32.4 G/DL — SIGNIFICANT CHANGE UP (ref 32–37)
MCV RBC AUTO: 94 FL — SIGNIFICANT CHANGE UP (ref 81–99)
MONOCYTES # BLD AUTO: 0.55 K/UL — SIGNIFICANT CHANGE UP (ref 0.1–0.6)
MONOCYTES NFR BLD AUTO: 8.3 % — SIGNIFICANT CHANGE UP (ref 1.7–9.3)
NEUTROPHILS # BLD AUTO: 4.65 K/UL — SIGNIFICANT CHANGE UP (ref 1.4–6.5)
NEUTROPHILS NFR BLD AUTO: 70.2 % — SIGNIFICANT CHANGE UP (ref 42.2–75.2)
PLATELET # BLD AUTO: 193 K/UL — SIGNIFICANT CHANGE UP (ref 130–400)
POTASSIUM SERPL-MCNC: 3.5 MMOL/L — SIGNIFICANT CHANGE UP (ref 3.5–5)
POTASSIUM SERPL-SCNC: 3.5 MMOL/L — SIGNIFICANT CHANGE UP (ref 3.5–5)
PROT SERPL-MCNC: 6.6 G/DL — SIGNIFICANT CHANGE UP (ref 6–8)
RBC # BLD: 4.36 M/UL — SIGNIFICANT CHANGE UP (ref 4.2–5.4)
RBC # FLD: 14.2 % — SIGNIFICANT CHANGE UP (ref 11.5–14.5)
SODIUM SERPL-SCNC: 141 MMOL/L — SIGNIFICANT CHANGE UP (ref 135–146)
TROPONIN T SERPL-MCNC: <0.01 NG/ML — SIGNIFICANT CHANGE UP
WBC # BLD: 6.62 K/UL — SIGNIFICANT CHANGE UP (ref 4.8–10.8)
WBC # FLD AUTO: 6.62 K/UL — SIGNIFICANT CHANGE UP (ref 4.8–10.8)

## 2018-10-25 RX ORDER — POLYETHYLENE GLYCOL 3350 17 G/17G
17 POWDER, FOR SOLUTION ORAL DAILY
Qty: 0 | Refills: 0 | Status: DISCONTINUED | OUTPATIENT
Start: 2018-10-25 | End: 2018-10-26

## 2018-10-25 RX ORDER — POTASSIUM CHLORIDE 20 MEQ
40 PACKET (EA) ORAL ONCE
Qty: 0 | Refills: 0 | Status: COMPLETED | OUTPATIENT
Start: 2018-10-25 | End: 2018-10-25

## 2018-10-25 RX ORDER — MORPHINE SULFATE 50 MG/1
0.5 CAPSULE, EXTENDED RELEASE ORAL ONCE
Qty: 0 | Refills: 0 | Status: DISCONTINUED | OUTPATIENT
Start: 2018-10-25 | End: 2018-10-25

## 2018-10-25 RX ORDER — ONDANSETRON 8 MG/1
4 TABLET, FILM COATED ORAL ONCE
Qty: 0 | Refills: 0 | Status: DISCONTINUED | OUTPATIENT
Start: 2018-10-25 | End: 2018-10-26

## 2018-10-25 RX ADMIN — Medication 0.5 MILLIGRAM(S): at 18:22

## 2018-10-25 RX ADMIN — SIMETHICONE 80 MILLIGRAM(S): 80 TABLET, CHEWABLE ORAL at 05:43

## 2018-10-25 RX ADMIN — Medication 0.5 MILLIGRAM(S): at 05:45

## 2018-10-25 RX ADMIN — LACTULOSE 20 GRAM(S): 10 SOLUTION ORAL at 12:15

## 2018-10-25 RX ADMIN — Medication 100 MILLIGRAM(S): at 18:23

## 2018-10-25 RX ADMIN — Medication 500 MICROGRAM(S): at 08:38

## 2018-10-25 RX ADMIN — Medication 81 MILLIGRAM(S): at 12:15

## 2018-10-25 RX ADMIN — BUDESONIDE AND FORMOTEROL FUMARATE DIHYDRATE 2 PUFF(S): 160; 4.5 AEROSOL RESPIRATORY (INHALATION) at 21:49

## 2018-10-25 RX ADMIN — Medication 500 MICROGRAM(S): at 19:35

## 2018-10-25 RX ADMIN — Medication 50 MILLIGRAM(S): at 18:23

## 2018-10-25 RX ADMIN — SIMETHICONE 80 MILLIGRAM(S): 80 TABLET, CHEWABLE ORAL at 13:12

## 2018-10-25 RX ADMIN — Medication 100 MILLIGRAM(S): at 05:42

## 2018-10-25 RX ADMIN — CEFTRIAXONE 100 GRAM(S): 500 INJECTION, POWDER, FOR SOLUTION INTRAMUSCULAR; INTRAVENOUS at 12:13

## 2018-10-25 RX ADMIN — Medication 50 MILLIGRAM(S): at 12:14

## 2018-10-25 RX ADMIN — MORPHINE SULFATE 0.5 MILLIGRAM(S): 50 CAPSULE, EXTENDED RELEASE ORAL at 12:16

## 2018-10-25 RX ADMIN — MORPHINE SULFATE 0.5 MILLIGRAM(S): 50 CAPSULE, EXTENDED RELEASE ORAL at 11:41

## 2018-10-25 RX ADMIN — Medication 500 MICROGRAM(S): at 02:11

## 2018-10-25 RX ADMIN — HEPARIN SODIUM 5000 UNIT(S): 5000 INJECTION INTRAVENOUS; SUBCUTANEOUS at 05:45

## 2018-10-25 RX ADMIN — Medication 500 MICROGRAM(S): at 14:28

## 2018-10-25 RX ADMIN — POLYETHYLENE GLYCOL 3350 17 GRAM(S): 17 POWDER, FOR SOLUTION ORAL at 08:35

## 2018-10-25 RX ADMIN — Medication 40 MILLIEQUIVALENT(S): at 12:14

## 2018-10-25 RX ADMIN — BUDESONIDE AND FORMOTEROL FUMARATE DIHYDRATE 2 PUFF(S): 160; 4.5 AEROSOL RESPIRATORY (INHALATION) at 08:17

## 2018-10-25 RX ADMIN — HEPARIN SODIUM 5000 UNIT(S): 5000 INJECTION INTRAVENOUS; SUBCUTANEOUS at 18:23

## 2018-10-25 RX ADMIN — LOSARTAN POTASSIUM 50 MILLIGRAM(S): 100 TABLET, FILM COATED ORAL at 05:42

## 2018-10-25 RX ADMIN — SIMETHICONE 80 MILLIGRAM(S): 80 TABLET, CHEWABLE ORAL at 21:47

## 2018-10-25 RX ADMIN — ATORVASTATIN CALCIUM 80 MILLIGRAM(S): 80 TABLET, FILM COATED ORAL at 21:46

## 2018-10-25 RX ADMIN — PANTOPRAZOLE SODIUM 40 MILLIGRAM(S): 20 TABLET, DELAYED RELEASE ORAL at 08:17

## 2018-10-25 NOTE — PROGRESS NOTE ADULT - SUBJECTIVE AND OBJECTIVE BOX
Patient is a 73y old  Female who presents with a chief complaint of vertigo , sob (24 Oct 2018 17:03)      Over Night Events:  Patient seen and examined no acute changes Nx shari s/p US       ROS:  See HPI    PHYSICAL EXAM    ICU Vital Signs Last 24 Hrs  T(C): 36.6 (24 Oct 2018 23:28), Max: 37.1 (24 Oct 2018 13:00)  T(F): 97.8 (24 Oct 2018 23:28), Max: 98.7 (24 Oct 2018 13:00)  HR: 73 (25 Oct 2018 00:41) (72 - 86)  BP: 115/58 (24 Oct 2018 23:28) (103/56 - 129/58)  BP(mean): 82 (24 Oct 2018 23:28) (82 - 84)  ABP: --  ABP(mean): --  RR: 17 (24 Oct 2018 23:28) (17 - 30)  SpO2: 95% (25 Oct 2018 00:41) (95% - 96%)      General: Aox3  HEENT:    miesha            Lymph Nodes: NO cervical LN   Lungs: Bilateral BS  Cardiovascular: Regular   Abdomen: Soft, Positive BS  Extremities: No clubbing   Skin: warm   Neurological: no motor deficit   Musculoskeletal: move all ext     I&O's Detail    24 Oct 2018 07:01  -  25 Oct 2018 07:00  --------------------------------------------------------  IN:    IV PiggyBack: 50 mL    Oral Fluid: 120 mL  Total IN: 170 mL    OUT:    Emesis: 100 mL    Voided: 900 mL  Total OUT: 1000 mL    Total NET: -830 mL          LABS:                          13.3   6.62  )-----------( 193      ( 25 Oct 2018 05:21 )             41.0         25 Oct 2018 05:21    141    |  95     |  41     ----------------------------<  89     3.5     |  33     |  1.2      Ca    9.4        25 Oct 2018 05:21    TPro  6.6    /  Alb  3.9    /  TBili  0.5    /  DBili  x      /  AST  22     /  ALT  17     /  AlkPhos  78     25 Oct 2018 05:21  Amylase x     lipase x                                                                                            Lactate, Blood: 1.9 mmol/L (10-22-18 @ 14:05)                                                                                                                                               MEDICATIONS  (STANDING):  aspirin  chewable 81 milliGRAM(s) Oral daily  atorvastatin 80 milliGRAM(s) Oral at bedtime  buDESOnide 160 MICROgram(s)/formoterol 4.5 MICROgram(s) Inhaler 2 Puff(s) Inhalation two times a day  cefTRIAXone   IVPB      cefTRIAXone   IVPB 1 Gram(s) IV Intermittent every 24 hours  docusate sodium 100 milliGRAM(s) Oral two times a day  heparin  Injectable 5000 Unit(s) SubCutaneous every 12 hours  influenza   Vaccine 0.5 milliLiter(s) IntraMuscular once  ipratropium    for Nebulization 500 MICROGram(s) Nebulizer every 6 hours  lactulose Syrup 20 Gram(s) Oral daily  LORazepam     Tablet 0.5 milliGRAM(s) Oral two times a day  losartan 50 milliGRAM(s) Oral daily  metoprolol tartrate 50 milliGRAM(s) Oral two times a day  pantoprazole    Tablet 40 milliGRAM(s) Oral before breakfast  simethicone 80 milliGRAM(s) Chew three times a day    MEDICATIONS  (PRN):  acetaminophen   Tablet .. 650 milliGRAM(s) Oral every 6 hours PRN Mild Pain (1 - 3)  aluminum hydroxide/magnesium hydroxide/simethicone Suspension 30 milliLiter(s) Oral every 4 hours PRN Dyspepsia          Xrays:  TLC:  OG:  ET tube:                                                                                    no infiltrate     US: normal    ECHO:

## 2018-10-25 NOTE — DIETITIAN INITIAL EVALUATION ADULT. - ENERGY NEEDS
Calories: 1621-1756kcals/day (MSJ A.F 1.2-1.3)   Protein: 50-60g/day (1-1.2g/kg)   Fluid: 1:1ml/kcal

## 2018-10-25 NOTE — PROGRESS NOTE ADULT - ASSESSMENT
IMPRESSION:  ARF secondary Pulmonary edema   CHF   bradycardia symptomatic   UTI   constipation       PLAN:    CNS: no sedation     HEENT: miesha    PULMONARY: keep pox >92% Bipap as needed     CARDIOVASCULAR:    cardiology follow    decrease lopressor to 50 Q 12 , losartan , off verapamil   hold lasix for now      GI: GI prophylaxis.  Feeding   stool softener       RENAL: follow lytes   Is and OS     INFECTIOUS DISEASE: pan cx rocephine total 5 days     HEMATOLOGICAL:  DVT prophylaxis.    ENDOCRINE:  Follow up FS.  Insulin protocol if needed.    MUSCULOSKELETAL:  downgrade floor       CRITICAL CARE TIME SPENT: ***

## 2018-10-25 NOTE — CHART NOTE - NSCHARTNOTEFT_GEN_A_CORE
72 yo Female w/ h/o COPD not on O2, HTN, depression, LVH, TIA presented with c/o sudden onset vertigo at 10 am this morning when she was watching TV in recliner. She had difficulty moving around after that as she had to hold on to things. It was associated with severe frontal headache, lower abdominal pain, nausea. Since then symptoms have been persistent. She also has been having increased SOB on exertion and dry cough worse from baseline for last couple of days. On presentation to ED, she is AAO*3, BP on presentation 70/50--> improved to 117/78 with 1 L IVF bolus, HR -44--> sinus stella w/ PACs on EKG. Stroke code called and CT head, at angio H and N ordered which were reported negative .    Active complaints and thing to Follow -   #complains Of intermittent episodes of self resolving LUQ pain- pointing towards rib- evaluated by GI DOUBTS abdominal pathology ;   RIB xray ordered- follow up   RUQ sono - negative  kub- negative  EKG- sinus @ 82  patient has difficulty ambulation - PT rehab recs- STR @ snf - patient and  agrees   case management consult     Patient being downgraded to medical floor 72 yo Female w/ h/o COPD not on O2, HTN, depression, LVH, TIA presented with c/o sudden onset vertigo at 10 am this morning when she was watching TV in recliner. She had difficulty moving around after that as she had to hold on to things. It was associated with severe frontal headache, lower abdominal pain, nausea. Since then symptoms have been persistent. She also has been having increased SOB on exertion and dry cough worse from baseline for last couple of days. On presentation to ED, she is AAO*3, BP on presentation 70/50--> improved to 117/78 with 1 L IVF bolus, HR -44--> sinus stella w/ PACs on EKG. Stroke code called and CT head, at angio H and N ordered which were reported negative .    Active complaints and thing to Follow -   #complains Of intermittent episodes of self resolving LUQ pain- pointing towards rib- evaluated by GI DOUBTS abdominal pathology ;   RIB xray ordered- follow up   RUQ sono - negative  kub- negative  EKG- sinus @ 82  patient has difficulty ambulation - PT rehab recs- STR @ snf - patient and  agrees   case management consult   verapmil on hold , lasix (not a home medication ) stopped 2/2 zainab   zainab improving     Patient being downgraded to medical floor

## 2018-10-25 NOTE — PROGRESS NOTE ADULT - ASSESSMENT
Patient without complaints. . Enzymes neg. Echo mild LVH nl function.  Correct k if needed.  Oob to chair. Ambulate if stable on Ra. Check SAT.

## 2018-10-25 NOTE — DIETITIAN INITIAL EVALUATION ADULT. - FACTORS AFF FOOD INTAKE
Pt reports that her appetite is currently fair because she feels that she is expending a lot of energy to eat. EMR reports a varied PO intake between 0-80%.

## 2018-10-25 NOTE — PROGRESS NOTE ADULT - ASSESSMENT
72 yo Female w/ h/o COPD not on O2, HTN, depression, LVH, TIA presented to ED with c/o SOB and vertigo x 1 day duration    #episode of Hypotension and bradycardia in ED -responded to IVF -resolved  LVH on echo   stopped verapamil   decreased dose of lopressor to 50 q12  echo results pending  cardio recs    #zainab - lasix stopped- creatinine improved  f/u bmp am    #c/o nausea and 1 episode of vomiting -   RUQ sono- normal  miralax, colace and senna   GI consult   hob elevation 45 degrees to avoid aspiration  protonix 40 mg      # CXR concern for increasing Right basilar opacity -   patient on rocephin   F/U PAN CULTURE  SPEECH AND SWALLOW     #cystitis -  rocephin IV 1 gm- complete 5 day course  f/u urine cx  f/u blood cx  monitor cbc    #vertigo- likely 2/2 orthostatic?  symptoms resolved now  f/u neuro recs  pt/rehab    #dvt ppx-lovenox  #full code, from home -lives with   PT/rehab- Short term  SNF  DOWNGRADE TO FLOOR

## 2018-10-25 NOTE — DIETITIAN INITIAL EVALUATION ADULT. - OTHER INFO
Reason for assessment: consult for education. PMH: COPD, HTN, PVD, TIA, cardiomyopathy, emphysema, cholecystectomy, Pt presented to ED with vertigo + headache, lower abdominal pain and nausea. Hypotension + bradycardia in ED (resolved), and JOYCE (improved) noted. Nausea w/ one episode of vomiting noted however Pt denies N/V/D now. Pt reports constipation. Abdomen noted as soft/nontender. NKFA. Diet education and handouts were provided on a heart healthy diet.

## 2018-10-25 NOTE — CONSULT NOTE ADULT - ASSESSMENT
Pt is a 73 yoa female pmh of COPD not on home O2,HTN, Depression, LVH, TIA 20 years ago, hyperlipidemia states     Recc  Will discuss with attending plan Pt is a 73 yoa female pmh of COPD not on home O2,HTN, Depression, LVH, TIA 20 years ago, hyperlipidemia pt most likely has resolving gastritis. Pain appears to be resolving. Abdominal exam benign. Pain unremarkable to be abdominally related. CT abdomen showed to abdominal pathology. Ultrasound showed unremarkable RUQ.    Rec  1.Advanced Diet as tolerated  2.IV PPI  3.X-ray Rib Series Pt is a 73 yoa female pmh of COPD not on home O2,HTN, Depression, LVH, TIA 20 years ago, hyperlipidemia pt most likely has resolving gastritis. Pain appears to be resolving. Abdominal exam benign. Pain unremarkable to be abdominally related. CT abdomen showed no abdominal pathology. Ultrasound showed unremarkable RUQ.    Rec  1.Advanced Diet as tolerated  2.IV PPI  3.X-ray Rib Series

## 2018-10-25 NOTE — CHART NOTE - NSCHARTNOTEFT_GEN_A_CORE
was called by nurse when patient complained of abdominal pain, LUQ pain ""12/10""  intensity , non radiating, not a/w sob, fever, dysuria , chills , nausea or vomiting . similar as yesterday but yesterday it was generalized now only LUQ  Vitals bp 116/62 hr 72, temp 97.7  PE- soft, point tenderness LUQ , pain reproducible by palpation , Bowel sounds + ; Last BM 10/23  denies chest pain,   A/P  states she takes advil at home for home prn but has been told cannot give her advil 2/2 zainab  US abdomen on 10/24 reported negative   KUB negative  repeat KUB today  good bowel regimen- miralax , colace, senna   OOB -chair   CE @ 15:00  morphine 0.5 mg iv x 1  EKG x 1   GI consult    Will f/u

## 2018-10-25 NOTE — DIETITIAN INITIAL EVALUATION ADULT. - ADHERENCE
Pt reports that she was following a diet PTA that involved a lot of shakes and yogurt however she can not remember the name of it. She reports that she has tried a lot of fad diets in the past.

## 2018-10-25 NOTE — PROGRESS NOTE ADULT - SUBJECTIVE AND OBJECTIVE BOX
SHAUN COATES  73y  Female  HPI:72 yo Female w/ h/o COPD not on O2, HTN, depression, LVH, TIA presented with c/o sudden onset vertigo at 10 am this morning when she was watching TV in recliner. She had difficulty moving around after that as she had to hold on to things. It was associated with severe frontal headache, lower abdominal pain, nausea. Since then symptoms have been persistent. She also has been having increased SOB on exertion and dry cough worse from baseline for last couple of days. On presentation to ED, she is AAO*3, BP on presentation 70/50--> improved to 117/78 with 1 L IVF bolus, HR -44--> sinus stella w/ PACs on EKG. Stroke code called and CT head, at angio H and N ordered which were reported negative .       Interval history: Nausea is better , No BM yesterday .No acute events overnight    Vital Signs Last 24 Hrs  T(C): 36.5 (25 Oct 2018 07:05), Max: 37.1 (24 Oct 2018 13:00)  T(F): 97.7 (25 Oct 2018 07:05), Max: 98.7 (24 Oct 2018 13:00)  HR: 72 (25 Oct 2018 07:00) (72 - 86)  BP: 116/62 (25 Oct 2018 07:00) (103/56 - 129/58)  BP(mean): 84 (25 Oct 2018 07:00) (82 - 84)  RR: 17 (25 Oct 2018 07:05) (17 - 30)  SpO2: 98% (25 Oct 2018 07:00) (95% - 98%)    PHYSICAL EXAM:  GENERAL: NAD, well-groomed, well-developed  HEENT - NC/AT, pupils equal and reactive to light,  ; Moist mucous membranes, Good dentition, No lesions  NECK: Supple, No JVD  CHEST/LUNG: Clear to auscultation bilaterally; No rales, rhonchi, wheezing  HEART: Regular rate and rhythm; No murmurs, rubs, or gallops  ABDOMEN: Soft, Nontender, Nondistended; Bowel sounds present  EXTREMITIES:  2+ Peripheral Pulses, No clubbing, cyanosis, or edema  NEURO:  No Focal deficits, sensory and motor intact  SKIN: No rashes or lesions    Consultant(s) Notes Reviewed:  [x ] YES  [ ] NO  Care Discussed with Consultants/Other Providers [ x] YES  [ ] NO    LABS:  LABS:                        13.3   6.62  )-----------( 193      ( 25 Oct 2018 05:21 )             41.0     25 Oct 2018 05:21    141    |  95     |  41     ----------------------------<  89     3.5     |  33     |  1.2      Ca    9.4        25 Oct 2018 05:21    TPro  6.6    /  Alb  3.9    /  TBili  0.5    /  DBili  x      /  AST  22     /  ALT  17     /  AlkPhos  78     25 Oct 2018 05:21    RADIOLOGY & ADDITIONAL TESTS:  < from: Xray Chest 1 View- PORTABLE-Routine (10.24.18 @ 06:53) >  Increasing right base opacity. Stable biapical   pleural thickening versus scarring.    < end of copied text >    < from: US Abdomen Limited (10.24.18 @ 13:35) >  Unremarkable right upper quadrant ultrasound. Status post   cholecystectomy, unremarkable.  No common bile  duct dilatation measuring 0.52 cm    < end of copied text >    ECHO- MILD CONCENTERIC lvh , ef>55 % rvsp <35 MMHG    Imaging Personally Reviewed:  [ X] YES  [ ] NO  MedsMEDICATIONS  (STANDING):  aspirin  chewable 81 milliGRAM(s) Oral daily  atorvastatin 80 milliGRAM(s) Oral at bedtime  buDESOnide 160 MICROgram(s)/formoterol 4.5 MICROgram(s) Inhaler 2 Puff(s) Inhalation two times a day  cefTRIAXone   IVPB      cefTRIAXone   IVPB 1 Gram(s) IV Intermittent every 24 hours  docusate sodium 100 milliGRAM(s) Oral two times a day  heparin  Injectable 5000 Unit(s) SubCutaneous every 12 hours  influenza   Vaccine 0.5 milliLiter(s) IntraMuscular once  ipratropium    for Nebulization 500 MICROGram(s) Nebulizer every 6 hours  lactulose Syrup 20 Gram(s) Oral daily  LORazepam     Tablet 0.5 milliGRAM(s) Oral two times a day  losartan 50 milliGRAM(s) Oral daily  metoprolol tartrate 50 milliGRAM(s) Oral two times a day  pantoprazole    Tablet 40 milliGRAM(s) Oral before breakfast  polyethylene glycol 3350 17 Gram(s) Oral daily  simethicone 80 milliGRAM(s) Chew three times a day    MEDICATIONS  (PRN):  acetaminophen   Tablet .. 650 milliGRAM(s) Oral every 6 hours PRN Mild Pain (1 - 3)  aluminum hydroxide/magnesium hydroxide/simethicone Suspension 30 milliLiter(s) Oral every 4 hours PRN Dyspepsia

## 2018-10-25 NOTE — PROGRESS NOTE ADULT - SUBJECTIVE AND OBJECTIVE BOX
Patient is a 73y old  Female who presents with a chief complaint of vertigo , sob (25 Oct 2018 07:20)      T(F): 97.8 (10-24-18 @ 23:28), Max: 98.7 (10-24-18 @ 13:00)  HR: 73 (10-25-18 @ 00:41)  BP: 115/58 (10-24-18 @ 23:28)  RR: 17 (10-24-18 @ 23:28)  SpO2: 95% (10-25-18 @ 00:41) (95% - 96%)    PHYSICAL EXAM:  GENERAL: NAD, well-groomed, well-developed  HEAD:  Atraumatic, Normocephalic  EYES: EOMI, PERRLA, conjunctiva and sclera clear  ENMT: No tonsillar erythema, exudates, or enlargement; Moist mucous membranes, Good dentition, No lesions  NECK: Supple, No JVD, Normal thyroid  NERVOUS SYSTEM:  Alert & Oriented X3,  Motor Strength 5/5 B/L upper and lower extremities  CHEST/LUNG: Clear to percussion bilaterally; No rales, rhonchi, wheezing, or rubs  HEART: Regular rate and rhythm; No murmurs, rubs, or gallops  ABDOMEN: Soft, Nontender, Nondistended; Bowel sounds present  EXTREMITIES:   No clubbing, cyanosis, or edema  LYMPH: No lymphadenopathy noted  SKIN: No rashes or lesions    labs  10-25    141  |  95<L>  |  41<H>  ----------------------------<  89  3.5   |  33<H>  |  1.2    Ca    9.4      25 Oct 2018 05:21    TPro  6.6  /  Alb  3.9  /  TBili  0.5  /  DBili  x   /  AST  22  /  ALT  17  /  AlkPhos  78  10-25                          13.3   6.62  )-----------( 193      ( 25 Oct 2018 05:21 )             41.0               acetaminophen   Tablet .. 650 milliGRAM(s) Oral every 6 hours PRN  aluminum hydroxide/magnesium hydroxide/simethicone Suspension 30 milliLiter(s) Oral every 4 hours PRN  aspirin  chewable 81 milliGRAM(s) Oral daily  atorvastatin 80 milliGRAM(s) Oral at bedtime  buDESOnide 160 MICROgram(s)/formoterol 4.5 MICROgram(s) Inhaler 2 Puff(s) Inhalation two times a day  cefTRIAXone   IVPB      cefTRIAXone   IVPB 1 Gram(s) IV Intermittent every 24 hours  docusate sodium 100 milliGRAM(s) Oral two times a day  heparin  Injectable 5000 Unit(s) SubCutaneous every 12 hours  influenza   Vaccine 0.5 milliLiter(s) IntraMuscular once  ipratropium    for Nebulization 500 MICROGram(s) Nebulizer every 6 hours  lactulose Syrup 20 Gram(s) Oral daily  LORazepam     Tablet 0.5 milliGRAM(s) Oral two times a day  losartan 50 milliGRAM(s) Oral daily  metoprolol tartrate 50 milliGRAM(s) Oral two times a day  pantoprazole    Tablet 40 milliGRAM(s) Oral before breakfast  simethicone 80 milliGRAM(s) Chew three times a day

## 2018-10-25 NOTE — CONSULT NOTE ADULT - SUBJECTIVE AND OBJECTIVE BOX
Patient is a 73y old  Female who presents with a chief complaint of vertigo , sob  GI Consulted for persistent nausea, occasional vomiting, generalized abdominal pain     Pt is a 73 yoa female pmh of COPD not on home O2,HTN, Depression, LVH, TIA 20 years ago, hyperlipidemia states she had 20/10 sharp diffuse abdominal pain when she got up from her recliner when watching tv Monday morning. Pt states the pain was so severe she could not walk and was BIBA Monday. Pt was having Sob, dry cough, dizziness as well on Monday. Pt states she is not having any abdominal pain today. Pt was experiencing dry-heave vomiting two days ago and experiencing dry heave vomiting yesterday after abdominal exam. Pt's last bowel movement was yesterday it was formed with no blood in stool no black stools brown in color hard in consistency. Pt is currently receiving lactulose syrup. Pt is not having nausea at visit and is on Zofran. Pt also states she was having abdominal discomfort and soreness after the ultrasound probe was used on ehr stomach yesterday. Pt today states she is not having the dry heave vomiting she had yesterday denies hematemesis dysphagia, melena, diarrhea,constipation. Pt has very slight tenderness to palpation of LUQ. Pts last colonoscopy was 5 years ago as per patient there was one benign polyp noted.      PAST MEDICAL & SURGICAL HISTORY:  PVD (peripheral vascular disease)  Other emphysema  Other cardiomyopathy  TIA (transient ischemic attack)  COPD (chronic obstructive pulmonary disease)  Depression  Hypertension  History of cholecystectomy 20 years ago  Torn Meniscus repair 25 years ago      Allergies: albuterol (Unknown)  codeine (Other (Moderate))  Depakote (Unknown)    Medications:   acetaminophen   Tablet .. 650 milliGRAM(s) Oral every 6 hours PRN  aluminum hydroxide/magnesium hydroxide/simethicone Suspension 30 milliLiter(s) Oral every 4 hours PRN  aspirin  chewable 81 milliGRAM(s) Oral daily  atorvastatin 80 milliGRAM(s) Oral at bedtime  buDESOnide 160 MICROgram(s)/formoterol 4.5 MICROgram(s) Inhaler 2 Puff(s) Inhalation two times a day  cefTRIAXone   IVPB      cefTRIAXone   IVPB 1 Gram(s) IV Intermittent every 24 hours  docusate sodium 100 milliGRAM(s) Oral two times a day  heparin  Injectable 5000 Unit(s) SubCutaneous every 12 hours  influenza   Vaccine 0.5 milliLiter(s) IntraMuscular once  ipratropium    for Nebulization 500 MICROGram(s) Nebulizer every 6 hours  lactulose Syrup 20 Gram(s) Oral daily  LORazepam     Tablet 0.5 milliGRAM(s) Oral two times a day  losartan 50 milliGRAM(s) Oral daily  metoprolol tartrate 50 milliGRAM(s) Oral two times a day  pantoprazole    Tablet 40 milliGRAM(s) Oral before breakfast  polyethylene glycol 3350 17 Gram(s) Oral daily  potassium chloride    Tablet ER 40 milliEquivalent(s) Oral once  simethicone 80 milliGRAM(s) Chew three times a day    Vital Signs Last 24 Hrs  T(C): 36.5 (25 Oct 2018 07:05), Max: 37.1 (24 Oct 2018 13:00)  T(F): 97.7 (25 Oct 2018 07:05), Max: 98.7 (24 Oct 2018 13:00)  HR: 72 (25 Oct 2018 07:00) (72 - 78)  BP: 116/62 (25 Oct 2018 07:00) (103/56 - 129/58)  BP(mean): 84 (25 Oct 2018 07:00) (82 - 84)  RR: 24 (25 Oct 2018 08:38) (17 - 30)  SpO2: 95% (25 Oct 2018 08:38) (95% - 98%)    Review of Systems  General:  Denies Fatigue, Denies Fever, Denies Weakness ,Denies Weight Loss   HEENT: Denies Trouble Swallowing ,Denies  Sore Throat , Denies Change in hearing/vision/speech ,Denies Dizziness    Cardio: Denies  Chest Pain , Palpitations    Respiratory: +dyspnea, Denies Cough  Abdomen: See detailed HPI      Physical Examination:  Physical Exam  Gen: NAD  HEENT: NC/AT, Mucosal Membranes  Cardio: S1/S2 No S3/S4, Regular  Resp: CTA B/L  Abdomen: Soft, +active bowel sounds ND slight tenderness to palpation LUQ  Neuro: AAOx3,   Extremities: FROM x 4      Data:                        13.3   6.62  )-----------( 193      ( 25 Oct 2018 05:21 )             41.0     10-25    141  |  95<L>  |  41<H>  ----------------------------<  89  3.5   |  33<H>  |  1.2    Ca    9.4      25 Oct 2018 05:21    TPro  6.6  /  Alb  3.9  /  TBili  0.5  /  DBili  x   /  AST  22  /  ALT  17  /  AlkPhos  78  10-25    LIVER FUNCTIONS - ( 25 Oct 2018 05:21 )  Alb: 3.9 g/dL / Pro: 6.6 g/dL / ALK PHOS: 78 U/L / ALT: 17 U/L / AST: 22 U/L / GGT: x             < from: US Abdomen Limited (10.24.18 @ 13:35) >  EXAM:  US ABDOMEN LIMITED            PROCEDURE DATE:  10/24/2018            INTERPRETATION:  CLINICAL HISTORY: Pain.    Correlation: CT abdomen and pelvis October 22, 2018.    PROCEDURE: Ultrasound of the right upper quadrant was performed.    FINDINGS:    LIVER:  Normal in contour and echogenicity measuring 12.4 cm in length x   12.3 cm AP with no focal mass or dilatation of the intrahepatic bile   ducts.    GALLBLADDER/BILIARY TREE: Status post cholecystectomy with no common bile   duct dilatation measuring 0.52 cm    PANCREAS: Obscured by overlying bowel gas.    KIDNEY:  Right kidney measures 12.1 cm in length x 3.6 cm in width x 4.4   cm AP with no hydronephrosis, calculi , perinephric fluid or solid mass.    AORTA/IVC:  Proximal portions obscured.    ASCITES:  No right upper quadrant ascites or right pleural effusion.    IMPRESSION:    Unremarkable right upper quadrant ultrasound. Status post   cholecystectomy, unremarkable.    < from: CT Abdomen and Pelvis No Cont (10.22.18 @ 17:59) >  EXAM:  CT ABDOMEN AND PELVIS            PROCEDURE DATE:  10/22/2018            INTERPRETATION:  CLINICAL INDICATION: Abdominal pain    TECHNIQUE:   CT abdomen and pelvis without intravenous contrast.  Intravenous contrast: None.  Oral contrast was not administered.     COMPARISON: CT of the abdomen dated 8/25/2013.     INTERPRETATION:  Evaluation of the abdominal viscera is limited without intravenous   contrast.    LUNG BASES/PLEURA: Trace bilateral pleural effusions.  There is bibasilar   interlobular septal thickening and groundglass opacities.  HEART: There is mild cardiomegaly. Aortic valvular and mitral annular   calcifications. Aortic and coronary vascular calcifications.    LIVER: Within normal limits.   BILIARY SYSTEM: There is no dilatation of the common bile duct.  GALLBLADDER: Prior cholecystectomy.     PANCREAS: Within normal limits.   SPLEEN: Within normal limits.   ADRENALS: Within normal limits.    KIDNEYS/URETERS: Multiple renal hypodensities which are too small to   characterize. The kidneys are otherwise within normal limits. No   hydronephrosis or stones.     BOWEL/MESENTERY: Copious amount of stool within the large bowel. No bowel   obstruction or wall thickening. Diverticulosis is present, without   evidence ofdiverticulitis.  The appendix is normal.     URINARY BLADDER: Excreted contrast within the bladder.  REPRODUCTIVE ORGANS: No pelvic masses.     PERITONEUM/RETROPERITONEUM: No free air. No free or loculated fluid.   LYMPH NODES: No abdominal or pelviclymphadenopathy.   VESSELS: Atherosclerotic calcifications are present. Small infrarenal   aortic  aneurysm measuring 1.6 cm.    BONES: There are degenerative changes of the spine.  SOFT TISSUES: Within normal limits.      IMPRESSION:  1. No CT evidence for acute abdominopelvic pathology.    2. Copious amounts of stool. Correlate for underlying constipation.    3. Findings compatible with pulmonary edema. Trace bilateral pleural   effusions.      < end of copied text >

## 2018-10-26 ENCOUNTER — TRANSCRIPTION ENCOUNTER (OUTPATIENT)
Age: 73
End: 2018-10-26

## 2018-10-26 VITALS — SYSTOLIC BLOOD PRESSURE: 130 MMHG | DIASTOLIC BLOOD PRESSURE: 59 MMHG | HEART RATE: 70 BPM

## 2018-10-26 LAB
ALBUMIN SERPL ELPH-MCNC: 3.8 G/DL — SIGNIFICANT CHANGE UP (ref 3.5–5.2)
ALP SERPL-CCNC: 80 U/L — SIGNIFICANT CHANGE UP (ref 30–115)
ALT FLD-CCNC: 18 U/L — SIGNIFICANT CHANGE UP (ref 0–41)
ANION GAP SERPL CALC-SCNC: 15 MMOL/L — HIGH (ref 7–14)
AST SERPL-CCNC: 24 U/L — SIGNIFICANT CHANGE UP (ref 0–41)
BASOPHILS # BLD AUTO: 0.04 K/UL — SIGNIFICANT CHANGE UP (ref 0–0.2)
BASOPHILS NFR BLD AUTO: 0.6 % — SIGNIFICANT CHANGE UP (ref 0–1)
BILIRUB SERPL-MCNC: 0.6 MG/DL — SIGNIFICANT CHANGE UP (ref 0.2–1.2)
BUN SERPL-MCNC: 35 MG/DL — HIGH (ref 10–20)
CALCIUM SERPL-MCNC: 9.3 MG/DL — SIGNIFICANT CHANGE UP (ref 8.5–10.1)
CHLORIDE SERPL-SCNC: 98 MMOL/L — SIGNIFICANT CHANGE UP (ref 98–110)
CO2 SERPL-SCNC: 30 MMOL/L — SIGNIFICANT CHANGE UP (ref 17–32)
CREAT SERPL-MCNC: 0.9 MG/DL — SIGNIFICANT CHANGE UP (ref 0.7–1.5)
EOSINOPHIL # BLD AUTO: 0.41 K/UL — SIGNIFICANT CHANGE UP (ref 0–0.7)
EOSINOPHIL NFR BLD AUTO: 6.6 % — SIGNIFICANT CHANGE UP (ref 0–8)
GLUCOSE SERPL-MCNC: 97 MG/DL — SIGNIFICANT CHANGE UP (ref 70–99)
HCT VFR BLD CALC: 39.4 % — SIGNIFICANT CHANGE UP (ref 37–47)
HGB BLD-MCNC: 12.7 G/DL — SIGNIFICANT CHANGE UP (ref 12–16)
IMM GRANULOCYTES NFR BLD AUTO: 0.2 % — SIGNIFICANT CHANGE UP (ref 0.1–0.3)
LYMPHOCYTES # BLD AUTO: 0.79 K/UL — LOW (ref 1.2–3.4)
LYMPHOCYTES # BLD AUTO: 12.8 % — LOW (ref 20.5–51.1)
MCHC RBC-ENTMCNC: 30.5 PG — SIGNIFICANT CHANGE UP (ref 27–31)
MCHC RBC-ENTMCNC: 32.2 G/DL — SIGNIFICANT CHANGE UP (ref 32–37)
MCV RBC AUTO: 94.7 FL — SIGNIFICANT CHANGE UP (ref 81–99)
MONOCYTES # BLD AUTO: 0.55 K/UL — SIGNIFICANT CHANGE UP (ref 0.1–0.6)
MONOCYTES NFR BLD AUTO: 8.9 % — SIGNIFICANT CHANGE UP (ref 1.7–9.3)
NEUTROPHILS # BLD AUTO: 4.38 K/UL — SIGNIFICANT CHANGE UP (ref 1.4–6.5)
NEUTROPHILS NFR BLD AUTO: 70.9 % — SIGNIFICANT CHANGE UP (ref 42.2–75.2)
PLATELET # BLD AUTO: 209 K/UL — SIGNIFICANT CHANGE UP (ref 130–400)
POTASSIUM SERPL-MCNC: 4.3 MMOL/L — SIGNIFICANT CHANGE UP (ref 3.5–5)
POTASSIUM SERPL-SCNC: 4.3 MMOL/L — SIGNIFICANT CHANGE UP (ref 3.5–5)
PROT SERPL-MCNC: 6.4 G/DL — SIGNIFICANT CHANGE UP (ref 6–8)
RBC # BLD: 4.16 M/UL — LOW (ref 4.2–5.4)
RBC # FLD: 14.1 % — SIGNIFICANT CHANGE UP (ref 11.5–14.5)
SODIUM SERPL-SCNC: 143 MMOL/L — SIGNIFICANT CHANGE UP (ref 135–146)
WBC # BLD: 6.18 K/UL — SIGNIFICANT CHANGE UP (ref 4.8–10.8)
WBC # FLD AUTO: 6.18 K/UL — SIGNIFICANT CHANGE UP (ref 4.8–10.8)

## 2018-10-26 RX ORDER — IPRATROPIUM BROMIDE 0.2 MG/ML
2.5 SOLUTION, NON-ORAL INHALATION
Qty: 60 | Refills: 0 | OUTPATIENT
Start: 2018-10-26 | End: 2018-11-09

## 2018-10-26 RX ORDER — MORPHINE SULFATE 50 MG/1
2 CAPSULE, EXTENDED RELEASE ORAL EVERY 4 HOURS
Qty: 0 | Refills: 0 | Status: DISCONTINUED | OUTPATIENT
Start: 2018-10-26 | End: 2018-10-26

## 2018-10-26 RX ADMIN — MORPHINE SULFATE 2 MILLIGRAM(S): 50 CAPSULE, EXTENDED RELEASE ORAL at 08:06

## 2018-10-26 RX ADMIN — CEFTRIAXONE 100 GRAM(S): 500 INJECTION, POWDER, FOR SOLUTION INTRAMUSCULAR; INTRAVENOUS at 11:19

## 2018-10-26 RX ADMIN — Medication 50 MILLIGRAM(S): at 17:32

## 2018-10-26 RX ADMIN — Medication 30 MILLILITER(S): at 06:10

## 2018-10-26 RX ADMIN — HEPARIN SODIUM 5000 UNIT(S): 5000 INJECTION INTRAVENOUS; SUBCUTANEOUS at 06:12

## 2018-10-26 RX ADMIN — Medication 500 MICROGRAM(S): at 07:40

## 2018-10-26 RX ADMIN — SIMETHICONE 80 MILLIGRAM(S): 80 TABLET, CHEWABLE ORAL at 06:12

## 2018-10-26 RX ADMIN — Medication 50 MILLIGRAM(S): at 06:11

## 2018-10-26 RX ADMIN — Medication 81 MILLIGRAM(S): at 11:20

## 2018-10-26 RX ADMIN — Medication 0.5 MILLIGRAM(S): at 17:33

## 2018-10-26 RX ADMIN — SIMETHICONE 80 MILLIGRAM(S): 80 TABLET, CHEWABLE ORAL at 14:02

## 2018-10-26 RX ADMIN — LOSARTAN POTASSIUM 50 MILLIGRAM(S): 100 TABLET, FILM COATED ORAL at 06:11

## 2018-10-26 RX ADMIN — PANTOPRAZOLE SODIUM 40 MILLIGRAM(S): 20 TABLET, DELAYED RELEASE ORAL at 06:11

## 2018-10-26 RX ADMIN — LACTULOSE 20 GRAM(S): 10 SOLUTION ORAL at 11:20

## 2018-10-26 RX ADMIN — BUDESONIDE AND FORMOTEROL FUMARATE DIHYDRATE 2 PUFF(S): 160; 4.5 AEROSOL RESPIRATORY (INHALATION) at 11:24

## 2018-10-26 RX ADMIN — POLYETHYLENE GLYCOL 3350 17 GRAM(S): 17 POWDER, FOR SOLUTION ORAL at 11:20

## 2018-10-26 RX ADMIN — Medication 100 MILLIGRAM(S): at 17:31

## 2018-10-26 RX ADMIN — Medication 500 MICROGRAM(S): at 01:32

## 2018-10-26 RX ADMIN — Medication 0.5 MILLIGRAM(S): at 06:23

## 2018-10-26 RX ADMIN — Medication 500 MICROGRAM(S): at 14:45

## 2018-10-26 RX ADMIN — Medication 500 MICROGRAM(S): at 19:46

## 2018-10-26 RX ADMIN — MORPHINE SULFATE 2 MILLIGRAM(S): 50 CAPSULE, EXTENDED RELEASE ORAL at 08:21

## 2018-10-26 RX ADMIN — Medication 100 MILLIGRAM(S): at 06:11

## 2018-10-26 NOTE — PROGRESS NOTE ADULT - SUBJECTIVE AND OBJECTIVE BOX
Despite IV steroids and bronchodilators patient desaturates to:    - Room air pulse ox. at rest:  88%    - Room air pulse ox while ambulating: **    - Pulse ox while ambulating on ** liters O2 **    Patient will require home O2 for discharge.  Patient is aware and agreeable to home O2.  Patient is in a chronic stable state of COPD.      Patient treated for pneumonia: No    Pneumonia treated and resolved: Y/N Despite IV steroids and bronchodilators patient desaturates to:    - Room air pulse ox. at rest:  88%    - Room air pulse ox while ambulating: **    - Pulse ox while ambulating on ** liters O2 **    Patient will require home O2 for discharge.  Patient is aware and agreeable to home O2.  Patient is in a chronic stable state of COPD.      Patient seen and evaluated independently I spoke with family at bedside and reviewed all results of tests done  40 min spent on dc    .

## 2018-10-26 NOTE — PROGRESS NOTE ADULT - ASSESSMENT
Patient is a 73y old  Female who presents with a chief complaint of vertigo , sob  GI Consulted for persistent nausea, occasional vomiting, generalized abdominal pain     Recc  1.Pt scheduled for discharge today or tomorrow   2.Spoke with pt and family about importance of EGD and Colonoscopy. Pt and family are comfortable following with their current Gastroenterologist Dr. Ye on [place]. Patient is a 73y old  Female who presents with a chief complaint of vertigo , sob  GI Consulted for persistent nausea, occasional vomiting, generalized abdominal pain   CT showed copious stool in colon      1.Pt scheduled for discharge today or tomorrow   2.Spoke with pt and family about importance of EGD and Colonoscopy. Pt and family are comfortable following with their current Gastroenterologist Dr. Ye on [place].  3. Miralax qd

## 2018-10-26 NOTE — DISCHARGE NOTE ADULT - HOSPITAL COURSE
HPI:74 yo Female w/ h/o COPD not on O2, HTN, depression, LVH, TIA presented with c/o sudden onset vertigo when she was watching TV in recliner. She had difficulty moving around after that as she had to hold on to things. It was associated with severe frontal headache, lower abdominal pain and nausea. She also has been having increased SOB on exertion. Pt was found in acute pulmonary edema and sob resolved with diuresis. 2DECHO was normal. Pt was found with uti and was treated with 5 days of antibiotics. Physiatry recommended dc  to [facility] but pt and family refusing and want home discharge and home pt. Room air pulse ox only 88% and pt dc'd on home o2.  Neurology cleared pt for dc and out patient follow up with possible mri brain

## 2018-10-26 NOTE — DISCHARGE NOTE ADULT - MEDICATION SUMMARY - MEDICATIONS TO TAKE
I will START or STAY ON the medications listed below when I get home from the hospital:    aspirin 81 mg oral tablet  -- 1 tab(s) by mouth once a day  -- Indication: For TIA (transient ischemic attack)    Duexis 800 mg-26.6 mg oral tablet  -- orally once a day  -- Indication: For Arthritis    valsartan 320 mg oral tablet  -- 1 tab(s) by mouth once a day  -- Indication: For HTN    verapamil 40 mg oral tablet  -- 1 tab(s) by mouth 3 times a day  -- Indication: For HTN    atorvastatin 80 mg oral tablet  -- 1 tab(s) by mouth once a day  -- Indication: For TIA (transient ischemic attack)    Metoprolol Tartrate 100 mg oral tablet  -- 1 tab(s) by mouth 2 times a day  -- Indication: For Other cardiomyopathy    Symbicort 160 mcg-4.5 mcg/inh inhalation aerosol  -- 2 puff(s) inhaled 2 times a day  -- Indication: For Other emphysema    Xopenex HFA 45 mcg/inh inhalation aerosol  -- 2 puff(s) inhaled every 4 hours  -- Indication: For Other emphysema    Spiriva 18 mcg inhalation capsule  -- 1 cap(s) inhaled once a day  -- Indication: For Other emphysema    ciclopirox 0.77% topical cream  -- Apply on skin to affected area 2 times a day  -- Indication: For home med    Microzide 12.5 mg oral capsule  -- 1 cap(s) by mouth once a day  -- Indication: For home med    raNITIdine 150 mg oral capsule  -- 1 cap(s) by mouth 2 times a day  -- Indication: For GERD    Fish Oil 1200 mg oral capsule  -- twice a day  -- Indication: For home med I will START or STAY ON the medications listed below when I get home from the hospital:    aspirin 81 mg oral tablet  -- 1 tab(s) by mouth once a day  -- Indication: For TIA (transient ischemic attack)    Duexis 800 mg-26.6 mg oral tablet  -- orally once a day  -- Indication: For Arthritis    valsartan 320 mg oral tablet  -- 1 tab(s) by mouth once a day  -- Indication: For HTN    verapamil 40 mg oral tablet  -- 1 tab(s) by mouth 3 times a day  -- Indication: For HTN    atorvastatin 80 mg oral tablet  -- 1 tab(s) by mouth once a day  -- Indication: For TIA (transient ischemic attack)    Metoprolol Tartrate 100 mg oral tablet  -- 1 tab(s) by mouth 2 times a day  -- Indication: For Other cardiomyopathy    Symbicort 160 mcg-4.5 mcg/inh inhalation aerosol  -- 2 puff(s) inhaled 2 times a day  -- Indication: For Other emphysema    Xopenex HFA 45 mcg/inh inhalation aerosol  -- 2 puff(s) inhaled every 4 hours  -- Indication: For Other emphysema    ipratropium 500 mcg/2.5 mL inhalation solution  -- 2.5 milliliter(s) by nebulizer every 6 hours   -- For inhalation only.  It is very important that you take or use this exactly as directed.  Do not skip doses or discontinue unless directed by your doctor.    -- Indication: For Shortness of breath    Spiriva 18 mcg inhalation capsule  -- 1 cap(s) inhaled once a day  -- Indication: For Other emphysema    ciclopirox 0.77% topical cream  -- Apply on skin to affected area 2 times a day  -- Indication: For home med    Microzide 12.5 mg oral capsule  -- 1 cap(s) by mouth once a day  -- Indication: For home med    raNITIdine 150 mg oral capsule  -- 1 cap(s) by mouth 2 times a day  -- Indication: For GERD    Fish Oil 1200 mg oral capsule  -- twice a day  -- Indication: For home med

## 2018-10-26 NOTE — DISCHARGE NOTE ADULT - PATIENT PORTAL LINK FT
You can access the [facility] Patient Portal, offered by [facility], by registering with the following website: [email or web address]

## 2018-10-26 NOTE — PROGRESS NOTE ADULT - PROVIDER SPECIALTY LIST ADULT
Cardiology
Cardiology
Gastroenterology
Hospitalist
Internal Medicine
Pulmonology
Pulmonology
Internal Medicine

## 2018-10-26 NOTE — DISCHARGE NOTE ADULT - CARE PLAN
Principal Discharge DX:	COPD (chronic obstructive pulmonary disease)  Goal:	asymptomatic  Assessment and plan of treatment:	home o2 24/7; home physical therapy  out patient pulmonary and neurology  follow up

## 2018-10-26 NOTE — PROGRESS NOTE ADULT - REASON FOR ADMISSION
vertigo , sob

## 2018-10-26 NOTE — PROGRESS NOTE ADULT - SUBJECTIVE AND OBJECTIVE BOX
Patient is a 73y old  Female who presents with a chief complaint of vertigo , sob  GI Consulted for persistent nausea, occasional vomiting, generalized abdominal pain     Pt is a 73 yoa female pmh of COPD not on home O2,HTN, Depression, LVH, TIA 20 years ago, hyperlipidemia states she had 20/10 sharp diffuse abdominal pain when she got up from her recliner when watching tv Monday morning. Pt states the pain was so severe she could not walk and was BIBA Monday. Pt was having Sob, dry cough, dizziness as well on Monday. Pt states she is not having any abdominal pain today. Pt was experiencing dry-heave vomiting two days ago and experiencing dry heave vomiting yesterday after abdominal exam. Pt's last bowel movement was yesterday it was formed with no blood in stool no black stools brown in color hard in consistency. Pt is currently receiving lactulose syrup. Pt is not having nausea at visit and is on Zofran. Pt also states she was having abdominal discomfort and soreness after the ultrasound probe was used on ehr stomach yesterday. Pt today states she is not having the dry heave vomiting she had yesterday denies hematemesis dysphagia, melena, diarrhea,constipation. Pt has slight tenderness to palpation of LUQ and slight epigastric tenderness today. Pts last colonoscopy was 5 years ago as per patient there was one benign polyp noted. Pt states she was not having any nausea today had an episode of slight dry heave vomiting today morning. Pt had a bowel movement yesterday pt denies diarrhea,constipation,melena,hemetemesis       PAST MEDICAL & SURGICAL HISTORY:  PVD (peripheral vascular disease)  Other emphysema  Other cardiomyopathy  TIA (transient ischemic attack)  COPD (chronic obstructive pulmonary disease)  Depression  Hypertension  History of cholecystectomy 20 years ago  Torn Meniscus repair 25 years ago    Vital Signs Last 24 Hrs  T(C): 35.6 (26 Oct 2018 14:26), Max: 36.3 (25 Oct 2018 16:46)  T(F): 96 (26 Oct 2018 14:26), Max: 97.3 (25 Oct 2018 16:46)  HR: 71 (26 Oct 2018 14:26) (62 - 84)  BP: 113/53 (26 Oct 2018 14:26) (113/53 - 125/57)  BP(mean): --  RR: 16 (26 Oct 2018 06:09) (16 - 20)  SpO2: 88% (26 Oct 2018 11:25) (88% - 96%)      Allergies: albuterol (Unknown)  codeine (Other (Moderate))  Depakote (Unknown)    MEDICATIONS  (STANDING):  aspirin  chewable 81 milliGRAM(s) Oral daily  atorvastatin 80 milliGRAM(s) Oral at bedtime  buDESOnide 160 MICROgram(s)/formoterol 4.5 MICROgram(s) Inhaler 2 Puff(s) Inhalation two times a day  cefTRIAXone   IVPB      cefTRIAXone   IVPB 1 Gram(s) IV Intermittent every 24 hours  docusate sodium 100 milliGRAM(s) Oral two times a day  heparin  Injectable 5000 Unit(s) SubCutaneous every 12 hours  influenza   Vaccine 0.5 milliLiter(s) IntraMuscular once  ipratropium    for Nebulization 500 MICROGram(s) Nebulizer every 6 hours  lactulose Syrup 20 Gram(s) Oral daily  LORazepam     Tablet 0.5 milliGRAM(s) Oral two times a day  losartan 50 milliGRAM(s) Oral daily  metoprolol tartrate 50 milliGRAM(s) Oral two times a day  ondansetron Injectable 4 milliGRAM(s) IV Push once  pantoprazole    Tablet 40 milliGRAM(s) Oral before breakfast  polyethylene glycol 3350 17 Gram(s) Oral daily  simethicone 80 milliGRAM(s) Chew three times a day    MEDICATIONS  (PRN):  acetaminophen   Tablet .. 650 milliGRAM(s) Oral every 6 hours PRN Mild Pain (1 - 3)  aluminum hydroxide/magnesium hydroxide/simethicone Suspension 30 milliLiter(s) Oral every 4 hours PRN Dyspepsia  morphine  - Injectable 2 milliGRAM(s) IV Push every 4 hours PRN Severe Pain (7 - 10)      Review of Systems  General:  Denies Fatigue, Denies Fever, Denies Weakness ,Denies Weight Loss   HEENT: Denies Trouble Swallowing ,Denies  Sore Throat , Denies Change in hearing/vision/speech ,Denies Dizziness    Cardio: Denies  Chest Pain , Palpitations    Respiratory: +dyspnea, Denies Cough  Abdomen: See detailed HPI      Physical Examination:  Physical Exam  Gen: NAD  HEENT: NC/AT, Mucosal Membranes  Cardio: S1/S2 No S3/S4, Regular  Resp: CTA B/L  Abdomen: Soft, +active bowel sounds ND slight tenderness to palpation LUQ and slight epigastric tenderness   Neuro: AAOx3,   Extremities: FROM x 4                            12.7   6.18  )-----------( 209      ( 26 Oct 2018 08:36 )             39.4     26 Oct 2018 08:36    143    |  98     |  35     ----------------------------<  97     4.3     |  30     |  0.9      Ca    9.3        26 Oct 2018 08:36    TPro  6.4    /  Alb  3.8    /  TBili  0.6    /  DBili  x      /  AST  24     /  ALT  18     /  AlkPhos  80     / Amylase x      /Lipase x      26 Oct 2018 08:36          < from: US Abdomen Limited (10.24.18 @ 13:35) >  EXAM:  US ABDOMEN LIMITED            PROCEDURE DATE:  10/24/2018            INTERPRETATION:  CLINICAL HISTORY: Pain.    Correlation: CT abdomen and pelvis October 22, 2018.    PROCEDURE: Ultrasound of the right upper quadrant was performed.    FINDINGS:    LIVER:  Normal in contour and echogenicity measuring 12.4 cm in length x   12.3 cm AP with no focal mass or dilatation of the intrahepatic bile   ducts.    GALLBLADDER/BILIARY TREE: Status post cholecystectomy with no common bile   duct dilatation measuring 0.52 cm    PANCREAS: Obscured by overlying bowel gas.    KIDNEY:  Right kidney measures 12.1 cm in length x 3.6 cm in width x 4.4   cm AP with no hydronephrosis, calculi , perinephric fluid or solid mass.    AORTA/IVC:  Proximal portions obscured.    ASCITES:  No right upper quadrant ascites or right pleural effusion.    IMPRESSION:    Unremarkable right upper quadrant ultrasound. Status post   cholecystectomy, unremarkable.    < from: CT Abdomen and Pelvis No Cont (10.22.18 @ 17:59) >  EXAM:  CT ABDOMEN AND PELVIS            PROCEDURE DATE:  10/22/2018            INTERPRETATION:  CLINICAL INDICATION: Abdominal pain    TECHNIQUE:   CT abdomen and pelvis without intravenous contrast.  Intravenous contrast: None.  Oral contrast was not administered.     COMPARISON: CT of the abdomen dated 8/25/2013.     INTERPRETATION:  Evaluation of the abdominal viscera is limited without intravenous   contrast.    LUNG BASES/PLEURA: Trace bilateral pleural effusions.  There is bibasilar   interlobular septal thickening and groundglass opacities.  HEART: There is mild cardiomegaly. Aortic valvular and mitral annular   calcifications. Aortic and coronary vascular calcifications.    LIVER: Within normal limits.   BILIARY SYSTEM: There is no dilatation of the common bile duct.  GALLBLADDER: Prior cholecystectomy.     PANCREAS: Within normal limits.   SPLEEN: Within normal limits.   ADRENALS: Within normal limits.    KIDNEYS/URETERS: Multiple renal hypodensities which are too small to   characterize. The kidneys are otherwise within normal limits. No   hydronephrosis or stones.     BOWEL/MESENTERY: Copious amount of stool within the large bowel. No bowel   obstruction or wall thickening. Diverticulosis is present, without   evidence ofdiverticulitis.  The appendix is normal.     URINARY BLADDER: Excreted contrast within the bladder.  REPRODUCTIVE ORGANS: No pelvic masses.     PERITONEUM/RETROPERITONEUM: No free air. No free or loculated fluid.   LYMPH NODES: No abdominal or pelviclymphadenopathy.   VESSELS: Atherosclerotic calcifications are present. Small infrarenal   aortic  aneurysm measuring 1.6 cm.    BONES: There are degenerative changes of the spine.  SOFT TISSUES: Within normal limits.      IMPRESSION:  1. No CT evidence for acute abdominopelvic pathology.    2. Copious amounts of stool. Correlate for underlying constipation.    3. Findings compatible with pulmonary edema. Trace bilateral pleural   effusions.    < from: Xray Ribs 3 Views, Bilateral (10.26.18 @ 12:04) >  EXAM:  XR RIBS 3 VIEWS BI            PROCEDURE DATE:  10/26/2018            INTERPRETATION:  Clinical History / Reason for exam: Rib pain.    4 views of bilateral ribs.    Comparison: None available.    Findings:    Evaluation of lung fields demonstrate no lobar consolidation, effusion or   pneumothorax.    There are no displaced rib fractures bilaterally.    Impression:    No acute displaced rib fractures bilaterally.    < end of copied text > Patient is a 73y old  Female who presents with a chief complaint of vertigo , sob  GI Consulted for persistent nausea, occasional vomiting, generalized abdominal pain     Pt is a 73 yoa female pmh of COPD not on home O2,HTN, Depression, LVH, TIA 20 years ago, hyperlipidemia states she had 20/10 sharp diffuse abdominal pain when she got up from her recliner when watching tv Monday morning. Pt states the pain was so severe she could not walk and was BIBA Monday. Pt was having Sob, dry cough, dizziness as well on Monday. Pt states she is not having any abdominal pain today. Pt was experiencing dry-heave vomiting two days ago and experiencing dry heave vomiting yesterday after abdominal exam. Pt's last bowel movement was yesterday it was formed with no blood in stool no black stools brown in color hard in consistency. Pt is currently receiving lactulose syrup. Pt is not having nausea at visit and is on Zofran. Pt also states she was having abdominal discomfort and soreness after the ultrasound probe was used on ehr stomach yesterday. Pt today states she is not having the dry heave vomiting she had yesterday denies hematemesis dysphagia, melena, diarrhea,constipation. Pt has slight tenderness to palpation of LUQ and slight epigastric tenderness today. Pts last colonoscopy was 5 years ago as per patient there was one benign polyp noted. Pt states she was not having any nausea today had an episode of slight dry heave vomiting today morning. Pt had a bowel movement yesterday pt denies diarrhea,constipation,melena,hemetemesis     Patient feeling back to baseline at this time      PAST MEDICAL & SURGICAL HISTORY:  PVD (peripheral vascular disease)  Other emphysema  Other cardiomyopathy  TIA (transient ischemic attack)  COPD (chronic obstructive pulmonary disease)  Depression  Hypertension  History of cholecystectomy 20 years ago  Torn Meniscus repair 25 years ago    Review of Systems  General:  Denies Fatigue, Denies Fever, Denies Weakness ,Denies Weight Loss   HEENT: Denies Trouble Swallowing ,Denies  Sore Throat , Denies Change in hearing/vision/speech ,Denies Dizziness    Cardio: Denies  Chest Pain , Palpitations    Respiratory: +dyspnea, Denies Cough  Abdomen: See detailed HPI    Vital Signs Last 24 Hrs  T(C): 35.6 (26 Oct 2018 14:26), Max: 36.3 (25 Oct 2018 16:46)  T(F): 96 (26 Oct 2018 14:26), Max: 97.3 (25 Oct 2018 16:46)  HR: 71 (26 Oct 2018 14:26) (62 - 84)  BP: 113/53 (26 Oct 2018 14:26) (113/53 - 125/57)  BP(mean): --  RR: 16 (26 Oct 2018 06:09) (16 - 20)  SpO2: 88% (26 Oct 2018 11:25) (88% - 96%)    Physical Examination:  Physical Exam  Gen: NAD  HEENT: NC/AT, Mucosal Membranes  Cardio: S1/S2 No S3/S4, Regular  Resp: CTA B/L  Abdomen: Soft, +active bowel sounds ND slight tenderness to palpation LUQ and slight epigastric tenderness   Neuro: AAOx3,   Extremities: FROM x 4  Allergies: albuterol (Unknown)  codeine (Other (Moderate))  Depakote (Unknown)    MEDICATIONS  (STANDING):  aspirin  chewable 81 milliGRAM(s) Oral daily  atorvastatin 80 milliGRAM(s) Oral at bedtime  buDESOnide 160 MICROgram(s)/formoterol 4.5 MICROgram(s) Inhaler 2 Puff(s) Inhalation two times a day  cefTRIAXone   IVPB      cefTRIAXone   IVPB 1 Gram(s) IV Intermittent every 24 hours  docusate sodium 100 milliGRAM(s) Oral two times a day  heparin  Injectable 5000 Unit(s) SubCutaneous every 12 hours  influenza   Vaccine 0.5 milliLiter(s) IntraMuscular once  ipratropium    for Nebulization 500 MICROGram(s) Nebulizer every 6 hours  lactulose Syrup 20 Gram(s) Oral daily  LORazepam     Tablet 0.5 milliGRAM(s) Oral two times a day  losartan 50 milliGRAM(s) Oral daily  metoprolol tartrate 50 milliGRAM(s) Oral two times a day  ondansetron Injectable 4 milliGRAM(s) IV Push once  pantoprazole    Tablet 40 milliGRAM(s) Oral before breakfast  polyethylene glycol 3350 17 Gram(s) Oral daily  simethicone 80 milliGRAM(s) Chew three times a day    MEDICATIONS  (PRN):  acetaminophen   Tablet .. 650 milliGRAM(s) Oral every 6 hours PRN Mild Pain (1 - 3)  aluminum hydroxide/magnesium hydroxide/simethicone Suspension 30 milliLiter(s) Oral every 4 hours PRN Dyspepsia  morphine  - Injectable 2 milliGRAM(s) IV Push every 4 hours PRN Severe Pain (7 - 10)                           12.7   6.18  )-----------( 209      ( 26 Oct 2018 08:36 )             39.4     26 Oct 2018 08:36    143    |  98     |  35     ----------------------------<  97     4.3     |  30     |  0.9      Ca    9.3        26 Oct 2018 08:36    TPro  6.4    /  Alb  3.8    /  TBili  0.6    /  DBili  x      /  AST  24     /  ALT  18     /  AlkPhos  80     / Amylase x      /Lipase x      26 Oct 2018 08:36          < from: US Abdomen Limited (10.24.18 @ 13:35) >  EXAM:  US ABDOMEN LIMITED            PROCEDURE DATE:  10/24/2018            INTERPRETATION:  CLINICAL HISTORY: Pain.    Correlation: CT abdomen and pelvis October 22, 2018.    PROCEDURE: Ultrasound of the right upper quadrant was performed.    FINDINGS:    LIVER:  Normal in contour and echogenicity measuring 12.4 cm in length x   12.3 cm AP with no focal mass or dilatation of the intrahepatic bile   ducts.    GALLBLADDER/BILIARY TREE: Status post cholecystectomy with no common bile   duct dilatation measuring 0.52 cm    PANCREAS: Obscured by overlying bowel gas.    KIDNEY:  Right kidney measures 12.1 cm in length x 3.6 cm in width x 4.4   cm AP with no hydronephrosis, calculi , perinephric fluid or solid mass.    AORTA/IVC:  Proximal portions obscured.    ASCITES:  No right upper quadrant ascites or right pleural effusion.    IMPRESSION:    Unremarkable right upper quadrant ultrasound. Status post   cholecystectomy, unremarkable.    < from: CT Abdomen and Pelvis No Cont (10.22.18 @ 17:59) >  EXAM:  CT ABDOMEN AND PELVIS            PROCEDURE DATE:  10/22/2018            INTERPRETATION:  CLINICAL INDICATION: Abdominal pain    TECHNIQUE:   CT abdomen and pelvis without intravenous contrast.  Intravenous contrast: None.  Oral contrast was not administered.     COMPARISON: CT of the abdomen dated 8/25/2013.     INTERPRETATION:  Evaluation of the abdominal viscera is limited without intravenous   contrast.    LUNG BASES/PLEURA: Trace bilateral pleural effusions.  There is bibasilar   interlobular septal thickening and groundglass opacities.  HEART: There is mild cardiomegaly. Aortic valvular and mitral annular   calcifications. Aortic and coronary vascular calcifications.    LIVER: Within normal limits.   BILIARY SYSTEM: There is no dilatation of the common bile duct.  GALLBLADDER: Prior cholecystectomy.     PANCREAS: Within normal limits.   SPLEEN: Within normal limits.   ADRENALS: Within normal limits.    KIDNEYS/URETERS: Multiple renal hypodensities which are too small to   characterize. The kidneys are otherwise within normal limits. No   hydronephrosis or stones.     BOWEL/MESENTERY: Copious amount of stool within the large bowel. No bowel   obstruction or wall thickening. Diverticulosis is present, without   evidence ofdiverticulitis.  The appendix is normal.     URINARY BLADDER: Excreted contrast within the bladder.  REPRODUCTIVE ORGANS: No pelvic masses.     PERITONEUM/RETROPERITONEUM: No free air. No free or loculated fluid.   LYMPH NODES: No abdominal or pelviclymphadenopathy.   VESSELS: Atherosclerotic calcifications are present. Small infrarenal   aortic  aneurysm measuring 1.6 cm.    BONES: There are degenerative changes of the spine.  SOFT TISSUES: Within normal limits.      IMPRESSION:  1. No CT evidence for acute abdominopelvic pathology.    2. Copious amounts of stool. Correlate for underlying constipation.    3. Findings compatible with pulmonary edema. Trace bilateral pleural   effusions.    < from: Xray Ribs 3 Views, Bilateral (10.26.18 @ 12:04) >  EXAM:  XR RIBS 3 VIEWS BI            PROCEDURE DATE:  10/26/2018            INTERPRETATION:  Clinical History / Reason for exam: Rib pain.    4 views of bilateral ribs.    Comparison: None available.    Findings:    Evaluation of lung fields demonstrate no lobar consolidation, effusion or   pneumothorax.    There are no displaced rib fractures bilaterally.    Impression:    No acute displaced rib fractures bilaterally.    < end of copied text >

## 2018-10-26 NOTE — DISCHARGE NOTE ADULT - CARE PROVIDER_API CALL
Muna Castillo), Family Medicine  [facility]  [address]  [place] [address]  Phone: [phone]  Fax: [phone]

## 2018-11-16 ENCOUNTER — INPATIENT (INPATIENT)
Facility: HOSPITAL | Age: 73
LOS: 5 days | Discharge: HOME HEALTH | End: 2018-11-22
Attending: INTERNAL MEDICINE | Admitting: INTERNAL MEDICINE

## 2018-11-16 VITALS
HEART RATE: 53 BPM | SYSTOLIC BLOOD PRESSURE: 138 MMHG | WEIGHT: 190.04 LBS | TEMPERATURE: 98 F | RESPIRATION RATE: 20 BRPM | DIASTOLIC BLOOD PRESSURE: 44 MMHG | HEIGHT: 62 IN

## 2018-11-16 DIAGNOSIS — Z90.49 ACQUIRED ABSENCE OF OTHER SPECIFIED PARTS OF DIGESTIVE TRACT: Chronic | ICD-10-CM

## 2018-11-16 LAB
ALBUMIN SERPL ELPH-MCNC: 3.8 G/DL — SIGNIFICANT CHANGE UP (ref 3.5–5.2)
ALP SERPL-CCNC: 90 U/L — SIGNIFICANT CHANGE UP (ref 30–115)
ALT FLD-CCNC: 133 U/L — HIGH (ref 0–41)
ANION GAP SERPL CALC-SCNC: 20 MMOL/L — HIGH (ref 7–14)
APTT BLD: 24.6 SEC — LOW (ref 27–39.2)
AST SERPL-CCNC: 161 U/L — HIGH (ref 0–41)
BASE EXCESS BLDV CALC-SCNC: -8.6 MMOL/L — LOW (ref -2–2)
BASOPHILS # BLD AUTO: 0.04 K/UL — SIGNIFICANT CHANGE UP (ref 0–0.2)
BASOPHILS NFR BLD AUTO: 0.3 % — SIGNIFICANT CHANGE UP (ref 0–1)
BILIRUB SERPL-MCNC: 0.5 MG/DL — SIGNIFICANT CHANGE UP (ref 0.2–1.2)
BUN SERPL-MCNC: 54 MG/DL — HIGH (ref 10–20)
CA-I SERPL-SCNC: 1.35 MMOL/L — HIGH (ref 1.12–1.3)
CALCIUM SERPL-MCNC: 9.2 MG/DL — SIGNIFICANT CHANGE UP (ref 8.5–10.1)
CHLORIDE SERPL-SCNC: 102 MMOL/L — SIGNIFICANT CHANGE UP (ref 98–110)
CO2 SERPL-SCNC: 18 MMOL/L — SIGNIFICANT CHANGE UP (ref 17–32)
CREAT SERPL-MCNC: 2 MG/DL — HIGH (ref 0.7–1.5)
EOSINOPHIL # BLD AUTO: 0.22 K/UL — SIGNIFICANT CHANGE UP (ref 0–0.7)
EOSINOPHIL NFR BLD AUTO: 1.8 % — SIGNIFICANT CHANGE UP (ref 0–8)
GAS PNL BLDV: 137 MMOL/L — SIGNIFICANT CHANGE UP (ref 136–145)
GAS PNL BLDV: SIGNIFICANT CHANGE UP
GLUCOSE SERPL-MCNC: 116 MG/DL — HIGH (ref 70–99)
HCO3 BLDV-SCNC: 20 MMOL/L — LOW (ref 22–29)
HCT VFR BLD CALC: 37.1 % — SIGNIFICANT CHANGE UP (ref 37–47)
HCT VFR BLDA CALC: 56.9 % — HIGH (ref 34–44)
HGB BLD CALC-MCNC: 18.6 G/DL — HIGH (ref 14–18)
HGB BLD-MCNC: 11.9 G/DL — LOW (ref 12–16)
HOROWITZ INDEX BLDV+IHG-RTO: 21 — SIGNIFICANT CHANGE UP
IMM GRANULOCYTES NFR BLD AUTO: 1.5 % — HIGH (ref 0.1–0.3)
INR BLD: 1.23 RATIO — SIGNIFICANT CHANGE UP (ref 0.65–1.3)
LACTATE BLDV-MCNC: 4 MMOL/L — HIGH (ref 0.5–1.6)
LIDOCAIN IGE QN: 77 U/L — HIGH (ref 7–60)
LYMPHOCYTES # BLD AUTO: 1.46 K/UL — SIGNIFICANT CHANGE UP (ref 1.2–3.4)
LYMPHOCYTES # BLD AUTO: 12 % — LOW (ref 20.5–51.1)
MAGNESIUM SERPL-MCNC: 2.3 MG/DL — SIGNIFICANT CHANGE UP (ref 1.8–2.4)
MCHC RBC-ENTMCNC: 31.2 PG — HIGH (ref 27–31)
MCHC RBC-ENTMCNC: 32.1 G/DL — SIGNIFICANT CHANGE UP (ref 32–37)
MCV RBC AUTO: 97.4 FL — SIGNIFICANT CHANGE UP (ref 81–99)
MONOCYTES # BLD AUTO: 0.75 K/UL — HIGH (ref 0.1–0.6)
MONOCYTES NFR BLD AUTO: 6.2 % — SIGNIFICANT CHANGE UP (ref 1.7–9.3)
NEUTROPHILS # BLD AUTO: 9.5 K/UL — HIGH (ref 1.4–6.5)
NEUTROPHILS NFR BLD AUTO: 78.2 % — HIGH (ref 42.2–75.2)
NRBC # BLD: 0 /100 WBCS — SIGNIFICANT CHANGE UP (ref 0–0)
NT-PROBNP SERPL-SCNC: 7237 PG/ML — HIGH (ref 0–300)
PCO2 BLDV: 54 MMHG — HIGH (ref 41–51)
PH BLDV: 7.18 — LOW (ref 7.26–7.43)
PLATELET # BLD AUTO: 182 K/UL — SIGNIFICANT CHANGE UP (ref 130–400)
PO2 BLDV: 18 MMHG — LOW (ref 20–40)
POTASSIUM BLDV-SCNC: 5.1 MMOL/L — SIGNIFICANT CHANGE UP (ref 3.3–5.6)
POTASSIUM SERPL-MCNC: 5.2 MMOL/L — HIGH (ref 3.5–5)
POTASSIUM SERPL-SCNC: 5.2 MMOL/L — HIGH (ref 3.5–5)
PROT SERPL-MCNC: 6 G/DL — SIGNIFICANT CHANGE UP (ref 6–8)
PROTHROM AB SERPL-ACNC: 13.3 SEC — HIGH (ref 9.95–12.87)
RBC # BLD: 3.81 M/UL — LOW (ref 4.2–5.4)
RBC # FLD: 14.6 % — HIGH (ref 11.5–14.5)
SAO2 % BLDV: 16 % — SIGNIFICANT CHANGE UP
SODIUM SERPL-SCNC: 140 MMOL/L — SIGNIFICANT CHANGE UP (ref 135–146)
TROPONIN T SERPL-MCNC: <0.01 NG/ML — SIGNIFICANT CHANGE UP
WBC # BLD: 12.15 K/UL — HIGH (ref 4.8–10.8)
WBC # FLD AUTO: 12.15 K/UL — HIGH (ref 4.8–10.8)

## 2018-11-16 RX ORDER — VALSARTAN 80 MG/1
1 TABLET ORAL
Qty: 0 | Refills: 0 | COMMUNITY

## 2018-11-16 RX ORDER — BUDESONIDE AND FORMOTEROL FUMARATE DIHYDRATE 160; 4.5 UG/1; UG/1
2 AEROSOL RESPIRATORY (INHALATION)
Qty: 0 | Refills: 0 | COMMUNITY

## 2018-11-16 RX ORDER — IBUPROFEN AND FAMOTIDINE 26.6; 8 MG/1; MG/1
0 TABLET, FILM COATED ORAL
Qty: 0 | Refills: 0 | COMMUNITY

## 2018-11-16 NOTE — ED PROVIDER NOTE - OBJECTIVE STATEMENT
73 y.o female with hx of COPD, depression, HTN, PVD, TIA presents to the ED for evaluation of abdominal pain this evening.  Pt states that she was sitting felt acute sharp abdominal pain and weak prompting her to call EMS.  EMS arrived and noted pt bradycardic in the 30s and hypotensive 70s/50s.  Give atropine with some relief of the BP, but still hypotensive so started on dopamine with improvement of BP.  Pt complaining of abdominal pain and associated nausea.  Admits to having similar episode 10/22 and was admitted until 10/26 had negative work up.  ECHO WNL. Pt denies any other complaints.  Has been complaint with medications and has not taken extra doses.

## 2018-11-16 NOTE — ED ADULT TRIAGE NOTE - CHIEF COMPLAINT QUOTE
pt   with   abdominalpain   and  cool extremities     on  EMS   arrival  pt  hypotensive     given   dopamine   zofran   and   atropine

## 2018-11-16 NOTE — ED PROVIDER NOTE - PHYSICAL EXAMINATION
CONST: Well appearing in NAD  EYES: Sclera and conjunctiva clear.  NECK: Non-tender, no meningeal signs, supple  CARD: Normal S1 S2; Normal rate and rhythm  RESP: Equal BS B/L, No wheezes, rhonchi or rales. No distress  GI: Soft, obses, diffuse tenderness, no rebound or guarding   MS: Normal ROM in all extremities. No edema of lower extremities, no calf pain, radial pulses 1+ bilaterally, poor pedal pulses, capillary refill < 2 seconds   SKIN: Warm, dry, no acute rashes. Good turgor  NEURO: A&Ox3, No focal deficits. Strength 5/5 with no sensory deficits.

## 2018-11-16 NOTE — ED PROVIDER NOTE - PROGRESS NOTE DETAILS
Dr. Baig and Hesham Hirsch at bedside - requesting tox consult Approved for ICU - case d/w Dr. Moore who accepts admission. Case d/w tox. Initially BP WNL upon arrival.  During course because hypotensive.  Fluid given judiciously considering PMHx.  No improvement with fluids so pressors started with improvement of BP. A-line placed to monitor BP. Pt mentating well.  Pt persistently bradycardic with no etiology.  pt compliant with medication regimen.  Glucagon and calcium ordered. Tox consulted. States that abdominal pain improved. admitted to unit.

## 2018-11-16 NOTE — ED ADULT NURSE NOTE - NSIMPLEMENTINTERV_GEN_ALL_ED
Implemented All Fall with Harm Risk Interventions:  [place] to call system. Call bell, personal items and telephone within reach. Instruct patient to call for assistance. Room bathroom lighting operational. Non-slip footwear when patient is off stretcher. Physically safe environment: no spills, clutter or unnecessary equipment. Stretcher in lowest position, wheels locked, appropriate side rails in place. Provide visual cue, wrist band, yellow gown, etc. Monitor gait and stability. Monitor for mental status changes and reorient to person, place, and time. Review medications for side effects contributing to fall risk. Reinforce activity limits and safety measures with patient and family. Provide visual clues: red socks.

## 2018-11-16 NOTE — ED PROVIDER NOTE - CRITICAL CARE PROVIDED
consultation with other physicians/consult w/ pt's family directly relating to pts condition/additional history taking/interpretation of diagnostic studies/direct patient care (not related to procedure)/documentation

## 2018-11-16 NOTE — ED PROVIDER NOTE - NS ED ROS FT
Constitutional: See HPI.  Eyes: No visual changes, eye pain or discharge.  ENMT: No hearing changes, pain, discharge or infections.  Cardiac: No SOB or edema. No chest pain with exertion.  Respiratory: No cough or respiratory distress.  GI: + abdominal pain, + nausea.  No vomiting, diarrhea   : No dysuria, frequency or burning. No Discharge  MS: No myalgia, muscle weakness, joint pain or back pain.  Neuro: No headache or weakness. No LOC.  Skin: No skin rash.  Except as documented in the HPI, all other systems are negative.

## 2018-11-16 NOTE — ED PROVIDER NOTE - ATTENDING CONTRIBUTION TO CARE
I am unable to obtain a comprehensive history, review of systems, past medical history, and/or physical exam due to constraints imposed by the urgency of the patient's clinical condition and/or mental status.     Pt is a 74yo female who comes for sudden onset severe abdominal pain.  EMS reports pt was stella to 30s and hypotensive to 70/50.  Notes cool legs (hx of vascular disease).  Gave Atropine and pt improved.  Felt nauseous and zofran given by EMS.  No syncope.  Hx of COPD and CHF.    Exam: diffusely tender abdomen, soft; RRR, CTAB, 1+ radial pulses, ? DP pulses, cap refill <2s, AAOx3, in NAD but intermittent spasms of abdomen  Imp: r/o dissection  Plan: EKG, XR, CT, labs, vbg

## 2018-11-17 PROBLEM — I73.9 PERIPHERAL VASCULAR DISEASE, UNSPECIFIED: Chronic | Status: ACTIVE | Noted: 2018-10-22

## 2018-11-17 PROBLEM — I42.8 OTHER CARDIOMYOPATHIES: Chronic | Status: ACTIVE | Noted: 2018-10-22

## 2018-11-17 PROBLEM — J43.8 OTHER EMPHYSEMA: Chronic | Status: ACTIVE | Noted: 2018-10-22

## 2018-11-17 LAB
ALBUMIN SERPL ELPH-MCNC: 4 G/DL — SIGNIFICANT CHANGE UP (ref 3.5–5.2)
ALLERGY+IMMUNOLOGY DIAG STUDY NOTE: SIGNIFICANT CHANGE UP
ALP SERPL-CCNC: 89 U/L — SIGNIFICANT CHANGE UP (ref 30–115)
ALT FLD-CCNC: 189 U/L — HIGH (ref 0–41)
ANION GAP SERPL CALC-SCNC: 16 MMOL/L — HIGH (ref 7–14)
APAP SERPL-MCNC: <5 UG/ML — LOW (ref 10–30)
APPEARANCE UR: CLEAR — SIGNIFICANT CHANGE UP
AST SERPL-CCNC: 201 U/L — HIGH (ref 0–41)
BILIRUB SERPL-MCNC: 0.4 MG/DL — SIGNIFICANT CHANGE UP (ref 0.2–1.2)
BILIRUB UR-MCNC: NEGATIVE — SIGNIFICANT CHANGE UP
BUN SERPL-MCNC: 48 MG/DL — HIGH (ref 10–20)
CALCIUM SERPL-MCNC: 9.4 MG/DL — SIGNIFICANT CHANGE UP (ref 8.5–10.1)
CHLORIDE SERPL-SCNC: 106 MMOL/L — SIGNIFICANT CHANGE UP (ref 98–110)
CK MB CFR SERPL CALC: 4.4 NG/ML — SIGNIFICANT CHANGE UP (ref 0.6–6.3)
CK SERPL-CCNC: 91 U/L — SIGNIFICANT CHANGE UP (ref 0–225)
CO2 SERPL-SCNC: 19 MMOL/L — SIGNIFICANT CHANGE UP (ref 17–32)
COLOR SPEC: YELLOW — SIGNIFICANT CHANGE UP
CREAT SERPL-MCNC: 1.5 MG/DL — SIGNIFICANT CHANGE UP (ref 0.7–1.5)
DIFF PNL FLD: NEGATIVE — SIGNIFICANT CHANGE UP
GAS PNL BLDA: SIGNIFICANT CHANGE UP
GLUCOSE SERPL-MCNC: 114 MG/DL — HIGH (ref 70–99)
GLUCOSE UR QL: NEGATIVE MG/DL — SIGNIFICANT CHANGE UP
HCT VFR BLD CALC: 36.7 % — LOW (ref 37–47)
HGB BLD-MCNC: 12.1 G/DL — SIGNIFICANT CHANGE UP (ref 12–16)
KETONES UR-MCNC: NEGATIVE — SIGNIFICANT CHANGE UP
LEUKOCYTE ESTERASE UR-ACNC: NEGATIVE — SIGNIFICANT CHANGE UP
MCHC RBC-ENTMCNC: 31 PG — SIGNIFICANT CHANGE UP (ref 27–31)
MCHC RBC-ENTMCNC: 33 G/DL — SIGNIFICANT CHANGE UP (ref 32–37)
MCV RBC AUTO: 94.1 FL — SIGNIFICANT CHANGE UP (ref 81–99)
NITRITE UR-MCNC: NEGATIVE — SIGNIFICANT CHANGE UP
NRBC # BLD: 0 /100 WBCS — SIGNIFICANT CHANGE UP (ref 0–0)
PH UR: 6.5 — SIGNIFICANT CHANGE UP (ref 5–8)
PLATELET # BLD AUTO: 178 K/UL — SIGNIFICANT CHANGE UP (ref 130–400)
POTASSIUM SERPL-MCNC: 4.2 MMOL/L — SIGNIFICANT CHANGE UP (ref 3.5–5)
POTASSIUM SERPL-SCNC: 4.2 MMOL/L — SIGNIFICANT CHANGE UP (ref 3.5–5)
PROT SERPL-MCNC: 5.9 G/DL — LOW (ref 6–8)
PROT UR-MCNC: NEGATIVE MG/DL — SIGNIFICANT CHANGE UP
RBC # BLD: 3.9 M/UL — LOW (ref 4.2–5.4)
RBC # FLD: 14.6 % — HIGH (ref 11.5–14.5)
SALICYLATES SERPL-MCNC: 1.9 MG/DL — LOW (ref 4–30)
SODIUM SERPL-SCNC: 141 MMOL/L — SIGNIFICANT CHANGE UP (ref 135–146)
SP GR SPEC: 1.01 — SIGNIFICANT CHANGE UP (ref 1.01–1.03)
T3 SERPL-MCNC: 94 NG/DL — SIGNIFICANT CHANGE UP (ref 80–200)
T4 AB SER-ACNC: 6.5 UG/DL — SIGNIFICANT CHANGE UP (ref 4.6–12)
TROPONIN T SERPL-MCNC: <0.01 NG/ML — SIGNIFICANT CHANGE UP
TSH SERPL-MCNC: 2.31 UIU/ML — SIGNIFICANT CHANGE UP (ref 0.27–4.2)
TYPE + AB SCN PNL BLD: SIGNIFICANT CHANGE UP
UROBILINOGEN FLD QL: 0.2 MG/DL — SIGNIFICANT CHANGE UP (ref 0.2–0.2)
WBC # BLD: 14.37 K/UL — HIGH (ref 4.8–10.8)
WBC # FLD AUTO: 14.37 K/UL — HIGH (ref 4.8–10.8)

## 2018-11-17 RX ORDER — MEROPENEM 1 G/30ML
1000 INJECTION INTRAVENOUS EVERY 12 HOURS
Qty: 0 | Refills: 0 | Status: DISCONTINUED | OUTPATIENT
Start: 2018-11-17 | End: 2018-11-18

## 2018-11-17 RX ORDER — DOPAMINE HYDROCHLORIDE 40 MG/ML
10 INJECTION, SOLUTION, CONCENTRATE INTRAVENOUS
Qty: 400 | Refills: 0 | Status: DISCONTINUED | OUTPATIENT
Start: 2018-11-17 | End: 2018-11-17

## 2018-11-17 RX ORDER — DOPAMINE HYDROCHLORIDE 40 MG/ML
5 INJECTION, SOLUTION, CONCENTRATE INTRAVENOUS
Qty: 800 | Refills: 0 | Status: DISCONTINUED | OUTPATIENT
Start: 2018-11-17 | End: 2018-11-17

## 2018-11-17 RX ORDER — ARFORMOTEROL TARTRATE 15 UG/2ML
15 SOLUTION RESPIRATORY (INHALATION) EVERY 12 HOURS
Qty: 0 | Refills: 0 | Status: DISCONTINUED | OUTPATIENT
Start: 2018-11-17 | End: 2018-11-21

## 2018-11-17 RX ORDER — TIOTROPIUM BROMIDE 18 UG/1
1 CAPSULE ORAL; RESPIRATORY (INHALATION) DAILY
Qty: 0 | Refills: 0 | Status: DISCONTINUED | OUTPATIENT
Start: 2018-11-17 | End: 2018-11-17

## 2018-11-17 RX ORDER — HEPARIN SODIUM 5000 [USP'U]/ML
5000 INJECTION INTRAVENOUS; SUBCUTANEOUS EVERY 8 HOURS
Qty: 0 | Refills: 0 | Status: DISCONTINUED | OUTPATIENT
Start: 2018-11-17 | End: 2018-11-22

## 2018-11-17 RX ORDER — NOREPINEPHRINE BITARTRATE/D5W 8 MG/250ML
0.05 PLASTIC BAG, INJECTION (ML) INTRAVENOUS
Qty: 8 | Refills: 0 | Status: DISCONTINUED | OUTPATIENT
Start: 2018-11-17 | End: 2018-11-18

## 2018-11-17 RX ORDER — SODIUM CHLORIDE 9 MG/ML
250 INJECTION INTRAMUSCULAR; INTRAVENOUS; SUBCUTANEOUS ONCE
Qty: 0 | Refills: 0 | Status: COMPLETED | OUTPATIENT
Start: 2018-11-17 | End: 2018-11-17

## 2018-11-17 RX ORDER — DOPAMINE HYDROCHLORIDE 40 MG/ML
5 INJECTION, SOLUTION, CONCENTRATE INTRAVENOUS
Qty: 400 | Refills: 0 | Status: DISCONTINUED | OUTPATIENT
Start: 2018-11-17 | End: 2018-11-17

## 2018-11-17 RX ORDER — LOSARTAN POTASSIUM 100 MG/1
50 TABLET, FILM COATED ORAL DAILY
Qty: 0 | Refills: 0 | Status: DISCONTINUED | OUTPATIENT
Start: 2018-11-17 | End: 2018-11-17

## 2018-11-17 RX ORDER — ASPIRIN/CALCIUM CARB/MAGNESIUM 324 MG
81 TABLET ORAL DAILY
Qty: 0 | Refills: 0 | Status: DISCONTINUED | OUTPATIENT
Start: 2018-11-17 | End: 2018-11-22

## 2018-11-17 RX ORDER — BUDESONIDE, MICRONIZED 100 %
0.25 POWDER (GRAM) MISCELLANEOUS
Qty: 0 | Refills: 0 | Status: DISCONTINUED | OUTPATIENT
Start: 2018-11-17 | End: 2018-11-21

## 2018-11-17 RX ORDER — FAMOTIDINE 10 MG/ML
20 INJECTION INTRAVENOUS DAILY
Qty: 0 | Refills: 0 | Status: DISCONTINUED | OUTPATIENT
Start: 2018-11-17 | End: 2018-11-22

## 2018-11-17 RX ORDER — CALCIUM GLUCONATE 100 MG/ML
1 VIAL (ML) INTRAVENOUS ONCE
Qty: 0 | Refills: 0 | Status: COMPLETED | OUTPATIENT
Start: 2018-11-17 | End: 2018-11-17

## 2018-11-17 RX ORDER — GLUCAGON INJECTION, SOLUTION 0.5 MG/.1ML
5 INJECTION, SOLUTION SUBCUTANEOUS ONCE
Qty: 0 | Refills: 0 | Status: DISCONTINUED | OUTPATIENT
Start: 2018-11-17 | End: 2018-11-20

## 2018-11-17 RX ORDER — CITALOPRAM 10 MG/1
30 TABLET, FILM COATED ORAL DAILY
Qty: 0 | Refills: 0 | Status: DISCONTINUED | OUTPATIENT
Start: 2018-11-17 | End: 2018-11-22

## 2018-11-17 RX ORDER — IPRATROPIUM/ALBUTEROL SULFATE 18-103MCG
3 AEROSOL WITH ADAPTER (GRAM) INHALATION EVERY 6 HOURS
Qty: 0 | Refills: 0 | Status: DISCONTINUED | OUTPATIENT
Start: 2018-11-17 | End: 2018-11-17

## 2018-11-17 RX ORDER — POTASSIUM CHLORIDE 20 MEQ
20 PACKET (EA) ORAL DAILY
Qty: 0 | Refills: 0 | Status: DISCONTINUED | OUTPATIENT
Start: 2018-11-17 | End: 2018-11-22

## 2018-11-17 RX ORDER — PANTOPRAZOLE SODIUM 20 MG/1
40 TABLET, DELAYED RELEASE ORAL
Qty: 0 | Refills: 0 | Status: DISCONTINUED | OUTPATIENT
Start: 2018-11-17 | End: 2018-11-19

## 2018-11-17 RX ORDER — OMEGA-3 ACID ETHYL ESTERS 1 G
2 CAPSULE ORAL
Qty: 0 | Refills: 0 | Status: DISCONTINUED | OUTPATIENT
Start: 2018-11-17 | End: 2018-11-17

## 2018-11-17 RX ORDER — GABAPENTIN 400 MG/1
300 CAPSULE ORAL THREE TIMES A DAY
Qty: 0 | Refills: 0 | Status: DISCONTINUED | OUTPATIENT
Start: 2018-11-17 | End: 2018-11-22

## 2018-11-17 RX ORDER — NOREPINEPHRINE BITARTRATE/D5W 8 MG/250ML
0.05 PLASTIC BAG, INJECTION (ML) INTRAVENOUS
Qty: 8 | Refills: 0 | Status: DISCONTINUED | OUTPATIENT
Start: 2018-11-17 | End: 2018-11-17

## 2018-11-17 RX ORDER — ATORVASTATIN CALCIUM 80 MG/1
80 TABLET, FILM COATED ORAL AT BEDTIME
Qty: 0 | Refills: 0 | Status: DISCONTINUED | OUTPATIENT
Start: 2018-11-17 | End: 2018-11-22

## 2018-11-17 RX ORDER — IPRATROPIUM BROMIDE 0.2 MG/ML
500 SOLUTION, NON-ORAL INHALATION EVERY 6 HOURS
Qty: 0 | Refills: 0 | Status: DISCONTINUED | OUTPATIENT
Start: 2018-11-17 | End: 2018-11-18

## 2018-11-17 RX ADMIN — ARFORMOTEROL TARTRATE 15 MICROGRAM(S): 15 SOLUTION RESPIRATORY (INHALATION) at 21:04

## 2018-11-17 RX ADMIN — GABAPENTIN 300 MILLIGRAM(S): 400 CAPSULE ORAL at 21:58

## 2018-11-17 RX ADMIN — SODIUM CHLORIDE 500 MILLILITER(S): 9 INJECTION INTRAMUSCULAR; INTRAVENOUS; SUBCUTANEOUS at 20:33

## 2018-11-17 RX ADMIN — ATORVASTATIN CALCIUM 80 MILLIGRAM(S): 80 TABLET, FILM COATED ORAL at 21:58

## 2018-11-17 RX ADMIN — Medication 3 MILLILITER(S): at 09:22

## 2018-11-17 RX ADMIN — Medication 3 MILLILITER(S): at 14:52

## 2018-11-17 RX ADMIN — LOSARTAN POTASSIUM 50 MILLIGRAM(S): 100 TABLET, FILM COATED ORAL at 11:58

## 2018-11-17 RX ADMIN — FAMOTIDINE 20 MILLIGRAM(S): 10 INJECTION INTRAVENOUS at 11:55

## 2018-11-17 RX ADMIN — HEPARIN SODIUM 5000 UNIT(S): 5000 INJECTION INTRAVENOUS; SUBCUTANEOUS at 05:29

## 2018-11-17 RX ADMIN — Medication 60 MILLIGRAM(S): at 01:07

## 2018-11-17 RX ADMIN — Medication 81 MILLIGRAM(S): at 11:56

## 2018-11-17 RX ADMIN — HEPARIN SODIUM 5000 UNIT(S): 5000 INJECTION INTRAVENOUS; SUBCUTANEOUS at 21:58

## 2018-11-17 RX ADMIN — Medication 8.08 MICROGRAM(S)/KG/MIN: at 21:31

## 2018-11-17 RX ADMIN — HEPARIN SODIUM 5000 UNIT(S): 5000 INJECTION INTRAVENOUS; SUBCUTANEOUS at 13:35

## 2018-11-17 RX ADMIN — PANTOPRAZOLE SODIUM 40 MILLIGRAM(S): 20 TABLET, DELAYED RELEASE ORAL at 17:16

## 2018-11-17 RX ADMIN — GABAPENTIN 300 MILLIGRAM(S): 400 CAPSULE ORAL at 13:34

## 2018-11-17 RX ADMIN — MEROPENEM 100 MILLIGRAM(S): 1 INJECTION INTRAVENOUS at 01:05

## 2018-11-17 RX ADMIN — PANTOPRAZOLE SODIUM 40 MILLIGRAM(S): 20 TABLET, DELAYED RELEASE ORAL at 01:07

## 2018-11-17 RX ADMIN — CITALOPRAM 30 MILLIGRAM(S): 10 TABLET, FILM COATED ORAL at 11:56

## 2018-11-17 RX ADMIN — Medication 500 MICROGRAM(S): at 20:09

## 2018-11-17 RX ADMIN — Medication 20 MILLIEQUIVALENT(S): at 11:58

## 2018-11-17 RX ADMIN — Medication 200 GRAM(S): at 01:43

## 2018-11-17 RX ADMIN — Medication 8.08 MICROGRAM(S)/KG/MIN: at 00:55

## 2018-11-17 RX ADMIN — Medication 0.25 MILLIGRAM(S): at 21:04

## 2018-11-17 RX ADMIN — Medication 60 MILLIGRAM(S): at 17:16

## 2018-11-17 RX ADMIN — MEROPENEM 100 MILLIGRAM(S): 1 INJECTION INTRAVENOUS at 17:16

## 2018-11-17 RX ADMIN — DOPAMINE HYDROCHLORIDE 8.08 MICROGRAM(S)/KG/MIN: 40 INJECTION, SOLUTION, CONCENTRATE INTRAVENOUS at 00:56

## 2018-11-17 NOTE — CONSULT NOTE ADULT - SUBJECTIVE AND OBJECTIVE BOX
CARDIOLOGY CONSULT NOTE     CHIEF COMPLAINT/REASON FOR CONSULT:    HPI:  73 year old female with known hx of COPD not on home O2, HTN, Depression, LVH, TIA 20 years ago, hyperlipidemia, CAD presented for epigastric pain associated with dizziness. In ED patient was bradycardic/ hypotensive. Treated with atropine with no response, then placed on Dopamine and Levophed with good response. As per family similar episode few weeks ago was bradycardic/ hypotensive found to be in CHF. Of note the patient takes verapamil and metoprolol at home. (17 Nov 2018 11:02)      PAST MEDICAL & SURGICAL HISTORY:  PVD (peripheral vascular disease)  Other emphysema  Other cardiomyopathy  TIA (transient ischemic attack)  COPD (chronic obstructive pulmonary disease)  Depression  Hypertension  History of cholecystectomy      Cardiac Risks:   [x ]HTN, [ ] DM, [ ] Smoking, [x ] FH,  [x ] Lipids        MEDICATIONS:  MEDICATIONS  (STANDING):  ALBUTerol/ipratropium for Nebulization 3 milliLiter(s) Nebulizer every 6 hours  aspirin enteric coated 81 milliGRAM(s) Oral daily  atorvastatin 80 milliGRAM(s) Oral at bedtime  citalopram 30 milliGRAM(s) Oral daily  famotidine    Tablet 20 milliGRAM(s) Oral daily  glucagon  Injectable 5 milliGRAM(s) IV Push once  heparin  Injectable 5000 Unit(s) SubCutaneous every 8 hours  losartan 50 milliGRAM(s) Oral daily  meropenem  IVPB 1000 milliGRAM(s) IV Intermittent every 12 hours  methylPREDNISolone sodium succinate Injectable 60 milliGRAM(s) IV Push two times a day  pantoprazole  Injectable 40 milliGRAM(s) IV Push two times a day  potassium chloride    Tablet ER 20 milliEquivalent(s) Oral daily  tiotropium 18 MICROgram(s) Capsule 1 Capsule(s) Inhalation daily      FAMILY HISTORY:  No pertinent family history in first degree relatives      SOCIAL HISTORY:      [ ] Marital status    Allergies    albuterol (Unknown)  codeine (Other (Moderate))  Depakote (Unknown)        	    REVIEW OF SYSTEMS:  CONSTITUTIONAL: No fever, weight loss, or fatigue  EYES: No eye pain, visual disturbances, or discharge  ENMT:  No difficulty hearing, tinnitus, vertigo; No sinus or throat pain  NECK: No pain or stiffness  RESPIRATORY: No cough, wheezing, chills or hemoptysis; No Shortness of Breath  CARDIOVASCULAR: No chest pain, palpitations, passing out, dizziness, or leg swelling  GASTROINTESTINAL: No abdominal or epigastric pain. No nausea, vomiting, or hematemesis; No diarrhea or constipation. No melena or hematochezia.  GENITOURINARY: No dysuria, frequency, hematuria, or incontinence  NEUROLOGICAL: No headaches, memory loss, loss of strength, numbness, or tremors  SKIN: No itching, burning, rashes, or lesions   	        PHYSICAL EXAM:  T(C): 35.8 (11-17-18 @ 11:01), Max: 36.9 (11-17-18 @ 09:00)  HR: 80 (11-17-18 @ 11:00) (48 - 84)  BP: 143/69 (11-17-18 @ 02:52) (68/43 - 159/72)  RR: 20 (11-17-18 @ 11:01) (16 - 20)  SpO2: 100% (11-17-18 @ 11:00) (96% - 100%)  Wt(kg): --  I&O's Summary    16 Nov 2018 07:01  -  17 Nov 2018 07:00  --------------------------------------------------------  IN: 15 mL / OUT: 0 mL / NET: 15 mL    17 Nov 2018 07:01  -  17 Nov 2018 11:32  --------------------------------------------------------  IN: 0 mL / OUT: 415 mL / NET: -415 mL        Appearance: Normal	  Psychiatry: A & O x 3, Mood & affect appropriate  HEENT:   Normal oral mucosa, PERRL, EOMI	  Lymphatic: No lymphadenopathy  Cardiovascular: Normal S1 S2,RRR, No JVD, No murmurs  Respiratory: Lungs clear to auscultation	  Gastrointestinal:  Soft, Non-tender, + BS	  Skin: No rashes, No ecchymoses, No cyanosis	  Neurologic: Non-focal  Extremities: Normal range of motion, No clubbing, cyanosis or edema  Vascular: Peripheral pulses palpable 2+ bilaterally      ECG:  	< from: 12 Lead ECG (11.16.18 @ 23:01) >  Diagnosis Line Sinus bradycardia  Otherwise normal ECG    Confirmed by VIVIENNE HAQUE MD ([id number]) on 11/17/2018 7:53:00 AM    < end of copied text >      	  LABS:	 	    CARDIAC MARKERS:          Serum Pro-Brain Natriuretic Peptide: 7237 pg/mL (11-16 @ 23:00)                            12.1   14.37 )-----------( 178      ( 17 Nov 2018 06:19 )             36.7     11-17    141  |  106  |  48<H>  ----------------------------<  114<H>  4.2   |  19  |  1.5    Ca    9.4      17 Nov 2018 06:19  Mg     2.3     11-16    TPro  5.9<L>  /  Alb  4.0  /  TBili  0.4  /  DBili  x   /  AST  201<H>  /  ALT  189<H>  /  AlkPhos  89  11-17    PT/INR - ( 16 Nov 2018 23:00 )   PT: 13.30 sec;   INR: 1.23 ratio         PTT - ( 16 Nov 2018 23:00 )  PTT:24.6 sec  proBNP: Serum Pro-Brain Natriuretic Peptide: 7237 pg/mL (11-16 @ 23:00)

## 2018-11-17 NOTE — ED ADULT NURSE REASSESSMENT NOTE - NS ED NURSE REASSESS COMMENT FT1
pt refusing  stephens cath, requesitng external cath. CLEMENCIA Whitlock aware, external cath placed.

## 2018-11-17 NOTE — CONSULT NOTE ADULT - SUBJECTIVE AND OBJECTIVE BOX
Patient is a 73y old  Female who presents with a chief complaint of epigastric pain/ dizziness    HPI:  Pt is a 73 years old female pmh of COPD not on home O2,HTN, Depression, LVH, TIA 20 years ago, hyperlipidemia, [place], states she had severe sharp epigastric pain started at 7: pm associated with dizinesss, V. in ED was bradycardic/ hypotensive was given atropine/ a line placed called to evaluate. as per family similar episode few weeks ago was bradycardic/ hypotensive found to be in CHF seen that time by cardio/ GI/ pulmonary. also in ED pan CT FOR Dissection, started on dopamine. to note patient is on verapamil/ metoprolol        PAST MEDICAL & SURGICAL HISTORY:  PVD (peripheral vascular disease)  Other emphysema  Other cardiomyopathy  TIA (transient ischemic attack)  COPD (chronic obstructive pulmonary disease)  Depression  Hypertension  History of cholecystectomy      SOCIAL HX:   Smoking   ex    FAMILY HISTORY:  No pertinent family history in first degree relatives      REVIEW OF SYSTEMS see hpi        Allergies    albuterol (Unknown)  codeine (Other (Moderate))  Depakote (Unknown)    Intolerances        calcium gluconate IVPB 1 Gram(s) IV Intermittent Once  DOPamine Infusion 5 MICROgram(s)/kG/Min IV Continuous <Continuous>  : Home Meds:      PHYSICAL EXAM    ICU Vital Signs Last 24 Hrs  T(C): 36.7 (16 Nov 2018 22:58), Max: 36.7 (16 Nov 2018 22:58)  T(F): 98 (16 Nov 2018 22:58), Max: 98 (16 Nov 2018 22:58)  HR: 53 (16 Nov 2018 22:58) (53 - 53)  BP: 138/44 (16 Nov 2018 22:58) (138/44 - 138/44)  RR: 20 (16 Nov 2018 22:58) (20 - 20)  SpO2: 95% 2 l nc      General:  HEENT:  MARY JANE, ill looking             Lymph Nodes: No cervical LN   Lungs: Bilateral BS, dec bs diffusely  Cardiovascular: Regular  Abdomen: Soft, Positive BS, epigastric tenderness  Extremities: No clubbing  Skin: Warm  Neurological: Non focal         LABS:                          11.9   12.15 )-----------( 182      ( 16 Nov 2018 23:00 )             37.1                                               11-16    140  |  102  |  54<H>  ----------------------------<  116<H>  5.2<H>   |  18  |  2.0<H>    Ca    9.2      16 Nov 2018 23:00  Mg     2.3     11-16    TPro  6.0  /  Alb  3.8  /  TBili  0.5  /  DBili  x   /  AST  161<H>  /  ALT  133<H>  /  AlkPhos  90  11-16      PT/INR - ( 16 Nov 2018 23:00 )   PT: 13.30 sec;   INR: 1.23 ratio         PTT - ( 16 Nov 2018 23:00 )  PTT:24.6 sec                                           CARDIAC MARKERS ( 16 Nov 2018 23:00 )  x     / <0.01 ng/mL / x     / x     / x                                                LIVER FUNCTIONS - ( 16 Nov 2018 23:00 )  Alb: 3.8 g/dL / Pro: 6.0 g/dL / ALK PHOS: 90 U/L / ALT: 133 U/L / AST: 161 U/L / GGT: x                                                                                                                                       X-Rays  reviewed/ CT C/A/P    MEDICATIONS  (STANDING):  calcium gluconate IVPB 1 Gram(s) IV Intermittent Once  DOPamine Infusion 5 MICROgram(s)/kG/Min (8.081 mL/Hr) IV Continuous <Continuous>    MEDICATIONS  (PRN):

## 2018-11-17 NOTE — H&P ADULT - HISTORY OF PRESENT ILLNESS
73 year old female with known hx of COPD not on home O2, HTN, Depression, LVH, TIA 20 years ago, hyperlipidemia, CAD presented for epigastric pain associated with dizziness. In ED patient was bradycardic/ hypotensive. Treated with atropine with no response, then placed on Dopamine and Levophed with good response. As per family similar episode few weeks ago was bradycardic/ hypotensive found to be in CHF. Of note the patient takes verapamil and metoprolol at home.

## 2018-11-17 NOTE — H&P ADULT - ATTENDING COMMENTS
Agree with resident's note, HPI, PE, assessment and plan.  Pt was seen and examined independently.    73 year old female with known hx of COPD not on home O2, HTN, Depression, LVH, TIA 20 years ago, hyperlipidemia, CAD presented for epigastric pain associated with dizziness. Admitted for symptomatic bradycardia/ hypotension in setting of Verapamil and Metoprolol use.  Pt was admitted in October with similar complaints and was told to stop taking Verapamil, but she continued to take it. ECHO done last admission - as per chart Nl LV function, LVH, couldn't get full report.    # Symptomatic bradycardia.  #Hypotension  # Transaminitis, nonobstructive pattern - likely due to hypotension  # Leucocytosis - started on meropenem in ED  # COPD  # CAD  # TIA    Plan:   - dc Verapamil, hold BB for now  - monitor BP  - monitor LFT  - continue Meropenem for now until Cx back  - can dc Shahana  - cont home meds for COPD and CAD except BB and Verapamil    DVT ppx

## 2018-11-17 NOTE — H&P ADULT - ASSESSMENT
73 year old female with known hx of COPD not on home O2, HTN, Depression, LVH, TIA 20 years ago, hyperlipidemia, CAD presented for epigastric pain associated with dizziness. Admitted for symptomatic bradycardic/ hypotension.    1. Bradycardia + dizziness + epispastic pain on metoprolol/verapamil with known hx of severe PVD and COPD  - Pulmonary Following; Cardio Consult ordered  - Solumedrol q 12, neb q 6 h, BiPAP at night, aspiration precaution, repeat ABG in AM; continue home inhalers  - Avoid fluid overload; patient is off of Dopamine and Levophed  - Continue with meropenem, pan culture pending  - HR and BP improved significantly  - Check RUQ US    2. HTN - uncontrolled now that hypotension resolved  - Restarted on Losartan; continue to hold HCTZ, Verapamil, and Metoprolol    3. DVT Prophylaxis - subq heparin    FULL CODE    Diet regular    Ambulate as tolerated.

## 2018-11-17 NOTE — CONSULT NOTE ADULT - ASSESSMENT
IMPRESSION: bradycardia/ dizziness/ episgatric pain on metoprolol/ verapamil/ severe PVD, multiple co morbidities./ s/p pan ct/ renal failure, ? meds toxicity, s/p calcium gluconate      PLAN:    CNS: avoid CND depressant    HEENT:  Oral care    PULMONARY:  HOB @ 45 degrees, solumedrol q 12, neb q 6 h, bipap at nite, aspiration precaution, repeat ABG    CARDIOVASCULAR: dopamine/ calcium/ ce, echo avoid fluid overload    GI: GI prophylaxis IV PROTONIX                                         Feeding NPO    RENAL:  F/u  lytes.  Correct as needed. accurate I/O, F/UP CMP, renal eval    INFECTIOUS DISEASE: ABX TILL CX    HEMATOLOGICAL:  DVT prophylaxis.    ENDOCRINE:  Follow up FS.  Insulin protocol if needed. TSH    MUSCULOSKELETAL:    CODE STATUS: FULL CODE    DISPOSITION: Pt requires monitoring in the MICU    prognosis guarded   spoke with family/ patient

## 2018-11-17 NOTE — H&P ADULT - NSHPPHYSICALEXAM_GEN_ALL_CORE
Constitutional: Well developed, well nourished. NAD. Good general hygiene  Head: Atraumatic.  Eyes: PERRLA. EOMI without discomfort.   ENT: No nasal discharge. Mucous membranes moist.  Neck: Supple. Painless ROM.  Cardiovascular: Regular rhythm. Regular rate. Normal S1 and S2. No murmurs. 2+ pulses in all extremities.   Pulmonary: Normal respiratory rate and effort. Lungs clear to auscultation bilaterally. No wheezing, rales, or rhonchi. Bilateral, equal lung expansion.   Abdominal: Soft. Nondistended. Nontender. No rebound or guarding.   Extremities: Pelvis stable. No lower extremity edema. Symmetric calves.  Skin: No rashes.   Neuro: AAOx3. No focal neurological deficits.  Psych: Normal mood. Normal affect.

## 2018-11-17 NOTE — CONSULT NOTE ADULT - ASSESSMENT
Patient with abd pain . Found bradycardic hypotensive. Patient claims told not take verapamil last admission. But was. Will stop verapramil

## 2018-11-17 NOTE — H&P ADULT - NSHPREVIEWOFSYSTEMS_GEN_ALL_CORE
Constitutional: No fever or chills. Normal appetite. No unintended weight loss.   Eyes: No vision changes.  ENT: No hearing changes. No ear pain. No sore throat.  Neck: No neck pain or stiffness.  Cardiovascular: SEE HPI  Pulmonary: No cough or SOB. No hemoptysis.  Abdominal: No nausea, vomiting, or diarrhea.  SEE HPI  : No dysuria or frequency. No hematuria.   Neuro: No headache, syncope, SEE HPI  MS: No back pain. No calf pain/swelling.  Psych: No suicidal or homicidal ideations.

## 2018-11-17 NOTE — ED PROCEDURE NOTE - CPROC ED TIME OUT STATEMENT1
“Patient's name, , procedure and correct site were confirmed during the [place] Timeout.”
“Patient's name, , procedure and correct site were confirmed during the [place] Timeout.”

## 2018-11-17 NOTE — H&P ADULT - NSHPLABSRESULTS_GEN_ALL_CORE
12.1   14.37 )-----------( 178      ( 2018 06:19 )             36.7           141  |  106  |  48<H>  ----------------------------<  114<H>  4.2   |  19  |  1.5    Ca    9.4      2018 06:19  Mg     2.3     11-    TPro  5.9<L>  /  Alb  4.0  /  TBili  0.4  /  DBili  x   /  AST  201<H>  /  ALT  189<H>  /  AlkPhos  89          ABG - ( 2018 00:49 )  pH, Arterial: 7.26  pH, Blood: x     /  pCO2: 38    /  pO2: 61    / HCO3: 17    / Base Excess: -9.8  /  SaO2: 87        Urinalysis Basic - ( 2018 06:30 )    Color: Yellow / Appearance: Clear / S.010 / pH: x  Gluc: x / Ketone: Negative  / Bili: Negative / Urobili: 0.2 mg/dL   Blood: x / Protein: Negative mg/dL / Nitrite: Negative   Leuk Esterase: Negative / RBC: x / WBC x   Sq Epi: x / Non Sq Epi: x / Bacteria: x    PT/INR - ( 2018 23:00 )   PT: 13.30 sec;   INR: 1.23 ratio    PTT - ( 2018 23:00 )  PTT:24.6 sec    CARDIAC MARKERS ( 2018 06:19 )  x     / <0.01 ng/mL / 91 U/L / x     / 4.4 ng/mL  CARDIAC MARKERS ( 2018 23:00 )  x     / <0.01 ng/mL / x     / x     / x

## 2018-11-17 NOTE — H&P ADULT - NSHPSOCIALHISTORY_GEN_ALL_CORE
Substance Use:  (X)Denies   ()Former User   ()Active User, Substance(s)? Last Use?  Tobacco Usage:  ()Never Smoker   (X)Former Smoker   ()Active Smoker, Pack Years ?  Alcohol Usage:  (X)Never Drinker   ()Social Drinker   ()Former Abuser   ()Active Abuser, Type?  Amount? How Often? Last Drink?    Home Living Situation: Home

## 2018-11-18 LAB
ANION GAP SERPL CALC-SCNC: 15 MMOL/L — HIGH (ref 7–14)
BUN SERPL-MCNC: 38 MG/DL — HIGH (ref 10–20)
CALCIUM SERPL-MCNC: 9 MG/DL — SIGNIFICANT CHANGE UP (ref 8.5–10.1)
CHLORIDE SERPL-SCNC: 108 MMOL/L — SIGNIFICANT CHANGE UP (ref 98–110)
CO2 SERPL-SCNC: 20 MMOL/L — SIGNIFICANT CHANGE UP (ref 17–32)
CREAT SERPL-MCNC: 1.2 MG/DL — SIGNIFICANT CHANGE UP (ref 0.7–1.5)
CULTURE RESULTS: NO GROWTH — SIGNIFICANT CHANGE UP
GLUCOSE SERPL-MCNC: 216 MG/DL — HIGH (ref 70–99)
HCT VFR BLD CALC: 32.7 % — LOW (ref 37–47)
HGB BLD-MCNC: 10.6 G/DL — LOW (ref 12–16)
MAGNESIUM SERPL-MCNC: 2.1 MG/DL — SIGNIFICANT CHANGE UP (ref 1.8–2.4)
MCHC RBC-ENTMCNC: 30.6 PG — SIGNIFICANT CHANGE UP (ref 27–31)
MCHC RBC-ENTMCNC: 32.4 G/DL — SIGNIFICANT CHANGE UP (ref 32–37)
MCV RBC AUTO: 94.5 FL — SIGNIFICANT CHANGE UP (ref 81–99)
NRBC # BLD: 0 /100 WBCS — SIGNIFICANT CHANGE UP (ref 0–0)
PLATELET # BLD AUTO: 171 K/UL — SIGNIFICANT CHANGE UP (ref 130–400)
POTASSIUM SERPL-MCNC: 4.2 MMOL/L — SIGNIFICANT CHANGE UP (ref 3.5–5)
POTASSIUM SERPL-SCNC: 4.2 MMOL/L — SIGNIFICANT CHANGE UP (ref 3.5–5)
RBC # BLD: 3.46 M/UL — LOW (ref 4.2–5.4)
RBC # FLD: 14.8 % — HIGH (ref 11.5–14.5)
SODIUM SERPL-SCNC: 143 MMOL/L — SIGNIFICANT CHANGE UP (ref 135–146)
SPECIMEN SOURCE: SIGNIFICANT CHANGE UP
WBC # BLD: 14.18 K/UL — HIGH (ref 4.8–10.8)
WBC # FLD AUTO: 14.18 K/UL — HIGH (ref 4.8–10.8)

## 2018-11-18 RX ORDER — IBUPROFEN 200 MG
400 TABLET ORAL EVERY 8 HOURS
Qty: 0 | Refills: 0 | Status: DISCONTINUED | OUTPATIENT
Start: 2018-11-18 | End: 2018-11-21

## 2018-11-18 RX ORDER — IPRATROPIUM/ALBUTEROL SULFATE 18-103MCG
3 AEROSOL WITH ADAPTER (GRAM) INHALATION EVERY 6 HOURS
Qty: 0 | Refills: 0 | Status: DISCONTINUED | OUTPATIENT
Start: 2018-11-18 | End: 2018-11-22

## 2018-11-18 RX ADMIN — ARFORMOTEROL TARTRATE 15 MICROGRAM(S): 15 SOLUTION RESPIRATORY (INHALATION) at 06:39

## 2018-11-18 RX ADMIN — MEROPENEM 100 MILLIGRAM(S): 1 INJECTION INTRAVENOUS at 05:20

## 2018-11-18 RX ADMIN — ARFORMOTEROL TARTRATE 15 MICROGRAM(S): 15 SOLUTION RESPIRATORY (INHALATION) at 21:01

## 2018-11-18 RX ADMIN — Medication 500 MICROGRAM(S): at 07:58

## 2018-11-18 RX ADMIN — CITALOPRAM 30 MILLIGRAM(S): 10 TABLET, FILM COATED ORAL at 12:55

## 2018-11-18 RX ADMIN — ATORVASTATIN CALCIUM 80 MILLIGRAM(S): 80 TABLET, FILM COATED ORAL at 21:01

## 2018-11-18 RX ADMIN — HEPARIN SODIUM 5000 UNIT(S): 5000 INJECTION INTRAVENOUS; SUBCUTANEOUS at 05:20

## 2018-11-18 RX ADMIN — Medication 60 MILLIGRAM(S): at 05:21

## 2018-11-18 RX ADMIN — GABAPENTIN 300 MILLIGRAM(S): 400 CAPSULE ORAL at 13:07

## 2018-11-18 RX ADMIN — Medication 20 MILLIEQUIVALENT(S): at 12:55

## 2018-11-18 RX ADMIN — HEPARIN SODIUM 5000 UNIT(S): 5000 INJECTION INTRAVENOUS; SUBCUTANEOUS at 13:07

## 2018-11-18 RX ADMIN — GABAPENTIN 300 MILLIGRAM(S): 400 CAPSULE ORAL at 21:01

## 2018-11-18 RX ADMIN — Medication 400 MILLIGRAM(S): at 17:25

## 2018-11-18 RX ADMIN — PANTOPRAZOLE SODIUM 40 MILLIGRAM(S): 20 TABLET, DELAYED RELEASE ORAL at 18:02

## 2018-11-18 RX ADMIN — FAMOTIDINE 20 MILLIGRAM(S): 10 INJECTION INTRAVENOUS at 12:54

## 2018-11-18 RX ADMIN — Medication 0.25 MILLIGRAM(S): at 20:18

## 2018-11-18 RX ADMIN — Medication 0.5 MILLIGRAM(S): at 18:44

## 2018-11-18 RX ADMIN — Medication 3 MILLILITER(S): at 20:17

## 2018-11-18 RX ADMIN — HEPARIN SODIUM 5000 UNIT(S): 5000 INJECTION INTRAVENOUS; SUBCUTANEOUS at 21:01

## 2018-11-18 RX ADMIN — Medication 400 MILLIGRAM(S): at 18:43

## 2018-11-18 RX ADMIN — Medication 1 APPLICATION(S): at 18:44

## 2018-11-18 RX ADMIN — Medication 81 MILLIGRAM(S): at 12:54

## 2018-11-18 RX ADMIN — PANTOPRAZOLE SODIUM 40 MILLIGRAM(S): 20 TABLET, DELAYED RELEASE ORAL at 06:21

## 2018-11-18 RX ADMIN — GABAPENTIN 300 MILLIGRAM(S): 400 CAPSULE ORAL at 05:20

## 2018-11-18 RX ADMIN — Medication 0.5 MILLIGRAM(S): at 09:50

## 2018-11-18 NOTE — PROGRESS NOTE ADULT - ASSESSMENT
73 year old female with known hx of COPD not on home O2, HTN, Depression, LVH, TIA 20 years ago, hyperlipidemia, CAD presented for epigastric pain associated with dizziness. Admitted for symptomatic bradycardic/ hypotension.    # Bradycardia + dizziness + epispastic pain on metoprolol/verapamil with known hx of severe PVD and COPD  - Pulmonary Following;   - Cardio Consult appreciated  - off all BP meds (home medications were: Metoprolol 100 mg, Losartan/HCTZ 100/25  - will change Solumedrol to Prednisone PO BiPAP at night, aspiration precaution, continue home inhalers  brought from home  - Avoid fluid overload; patient is off of Dopamine and Levophed  - Check RUQ US    # Hypotension  - improved  - no signs of infection, UA negative, CXR no infiltrate  - leucocytosis likely from steroids  - will hold all BP meds  - off Levophed, was a few hours only    # Transaminitis  - likely due to hypotension  - US liver: Status post cholecystectomy, unremarkable. The liver was negative on CT examination.  - monitor LFT    # HTN   - BP meds on hold    DVT Prophylaxis - subq heparin    FULL CODE

## 2018-11-18 NOTE — PROGRESS NOTE ADULT - SUBJECTIVE AND OBJECTIVE BOX
SHAUN COATES    Patient is a 73y old  Female who presents with a chief complaint of Symptomatic Bradycardia (2018 07:00)    HPI:  73 year old female with known hx of COPD not on home O2, HTN, Depression, LVH, TIA 20 years ago, hyperlipidemia, CAD presented for epigastric pain associated with dizziness. In ED patient was bradycardic/ hypotensive. Treated with atropine with no response, then placed on Dopamine and Levophed with good response. As per family similar episode few weeks ago was bradycardic/ hypotensive found to be in CHF. Of note the patient takes verapamil and metoprolol at home. (2018 11:02)    INTERVAL HPI/OVERNIGHT EVENTS: BP drop last night noted. Today pt is c/o SOB and anxiety.     ROS: All ROS negative except as documented above    PHYSICAL EXAM:  T(C): 36.9, Max: 36.9 (- @ 23:10)  HR: 93 (78 - 95)  BP: 126/59 (0/0 - 135/63)  RR: 26 (16 - 26)  SpO2: 97% (83% - 100%)    GENERAL: NAD  NECK: Supple, No JVD  PULMONARY/CHEST: No rales, rhonchi, or wheezing  CARDIOVASC: Regular rate and rhythm; No murmurs  GI/ABDOMEN: Soft, Nontender, Nondistended; Bowel sounds present  EXTREMITIES: Arterial catheter on R side, 2+ Peripheral Pulses, No clubbing, cyanosis, or edema, no deformity. No calf tenderness b/l.  SKIN: No rashes or lesions  NERVOUS SYSTEM:  Alert & Oriented X3, no focal deficit     Consultant(s) Notes Reviewed by me.     LABS:                        10.6   14.18 )-----------( 171      ( 2018 06:14 )             32.7   WBC Count: 14.18 K/uL (-)  WBC Count: 14.37 K/uL (-)  WBC Count: 12.15 K/uL (-)        143  |  108  |  38<H>  ----------------------------<  216<H>  4.2   |  20  |  1.2    Ca    9.0      2018 06:14  Mg     2.1            PTT - ( 2018 23:00 )  PTT:24.6 sec  Urinalysis Basic - ( 2018 06:30 )    Color: Yellow / Appearance: Clear / S.010 / pH: x  Gluc: x / Ketone: Negative  / Bili: Negative / Urobili: 0.2 mg/dL   Blood: x / Protein: Negative mg/dL / Nitrite: Negative   Leuk Esterase: Negative / RBC: x / WBC x   Sq Epi: x / Non Sq Epi: x / Bacteria: x      Urinalysis Basic - ( 2018 06:30 )    Color: Yellow / Appearance: Clear / S.010 / pH: x  Gluc: x / Ketone: Negative  / Bili: Negative / Urobili: 0.2 mg/dL   Blood: x / Protein: Negative mg/dL / Nitrite: Negative   Leuk Esterase: Negative / RBC: x / WBC x   Sq Epi: x / Non Sq Epi: x / Bacteria: x    CXR : No lobar consolidation, pleural effusion or pneumothorax.      MEDICATIONS  (STANDING):  arformoterol for Nebulization 15 MICROGram(s) Nebulizer every 12 hours  aspirin enteric coated 81 milliGRAM(s) Oral daily  atorvastatin 80 milliGRAM(s) Oral at bedtime  buDESOnide   0.25 milliGRAM(s) Respule 0.25 milliGRAM(s) Inhalation two times a day  citalopram 30 milliGRAM(s) Oral daily  famotidine    Tablet 20 milliGRAM(s) Oral daily  gabapentin 300 milliGRAM(s) Oral three times a day  glucagon  Injectable 5 milliGRAM(s) IV Push once  heparin  Injectable 5000 Unit(s) SubCutaneous every 8 hours  ipratropium    for Nebulization 500 MICROGram(s) Nebulizer every 6 hours  LORazepam     Tablet 0.5 milliGRAM(s) Oral two times a day  pantoprazole  Injectable 40 milliGRAM(s) IV Push two times a day  potassium chloride    Tablet ER 20 milliEquivalent(s) Oral daily  predniSONE   Tablet 60 milliGRAM(s) Oral daily    MEDICATIONS  (PRN):

## 2018-11-18 NOTE — PROGRESS NOTE ADULT - SUBJECTIVE AND OBJECTIVE BOX
Patient is a 73y old  Female who presents with a chief complaint of Symptomatic Bradycardia (17 Nov 2018 11:31)      T(F): 98 (11-18-18 @ 03:10), Max: 98.5 (11-17-18 @ 09:00)  HR: 89 (11-18-18 @ 06:07)  BP: 0/0 (11-18-18 @ 05:00)  RR: 16 (11-18-18 @ 03:10)  SpO2: 83% (11-18-18 @ 06:07) (83% - 100%)    PHYSICAL EXAM:  GENERAL: NAD, well-groomed, well-developed  HEAD:  Atraumatic, Normocephalic  EYES: EOMI, PERRLA, conjunctiva and sclera clear  ENMT: No tonsillar erythema, exudates, or enlargement; Moist mucous membranes, Good dentition, No lesions  NECK: Supple, No JVD, Normal thyroid  NERVOUS SYSTEM:  Alert & Oriented X3,  Motor Strength 5/5 B/L upper and lower extremities  CHEST/LUNG: Clear to percussion bilaterally; No rales, rhonchi, wheezing, or rubs  HEART: Regular rate and rhythm; No murmurs, rubs, or gallops  ABDOMEN: Soft, Nontender, Nondistended; Bowel sounds present  EXTREMITIES:   No clubbing, cyanosis, or edema  LYMPH: No lymphadenopathy noted  SKIN: No rashes or lesions    labs  11-17    141  |  106  |  48<H>  ----------------------------<  114<H>  4.2   |  19  |  1.5    Ca    9.4      17 Nov 2018 06:19  Mg     2.3     11-16    TPro  5.9<L>  /  Alb  4.0  /  TBili  0.4  /  DBili  x   /  AST  201<H>  /  ALT  189<H>  /  AlkPhos  89  11-17                          10.6   14.18 )-----------( 171      ( 18 Nov 2018 06:14 )             32.7       PT/INR - ( 16 Nov 2018 23:00 )   PT: 13.30 sec;   INR: 1.23 ratio         PTT - ( 16 Nov 2018 23:00 )  PTT:24.6 sec        arformoterol for Nebulization 15 MICROGram(s) Nebulizer every 12 hours  aspirin enteric coated 81 milliGRAM(s) Oral daily  atorvastatin 80 milliGRAM(s) Oral at bedtime  buDESOnide   0.25 milliGRAM(s) Respule 0.25 milliGRAM(s) Inhalation two times a day  citalopram 30 milliGRAM(s) Oral daily  famotidine    Tablet 20 milliGRAM(s) Oral daily  gabapentin 300 milliGRAM(s) Oral three times a day  glucagon  Injectable 5 milliGRAM(s) IV Push once  heparin  Injectable 5000 Unit(s) SubCutaneous every 8 hours  ipratropium    for Nebulization 500 MICROGram(s) Nebulizer every 6 hours  meropenem  IVPB 1000 milliGRAM(s) IV Intermittent every 12 hours  methylPREDNISolone sodium succinate Injectable 60 milliGRAM(s) IV Push two times a day  norepinephrine Infusion 0.05 MICROgram(s)/kG/Min IV Continuous <Continuous>  pantoprazole  Injectable 40 milliGRAM(s) IV Push two times a day  potassium chloride    Tablet ER 20 milliEquivalent(s) Oral daily

## 2018-11-19 LAB
ALBUMIN SERPL ELPH-MCNC: 3.5 G/DL — SIGNIFICANT CHANGE UP (ref 3.5–5.2)
ALP SERPL-CCNC: 75 U/L — SIGNIFICANT CHANGE UP (ref 30–115)
ALT FLD-CCNC: 87 U/L — HIGH (ref 0–41)
ANION GAP SERPL CALC-SCNC: 12 MMOL/L — SIGNIFICANT CHANGE UP (ref 7–14)
AST SERPL-CCNC: 42 U/L — HIGH (ref 0–41)
BASOPHILS # BLD AUTO: 0.01 K/UL — SIGNIFICANT CHANGE UP (ref 0–0.2)
BASOPHILS NFR BLD AUTO: 0.1 % — SIGNIFICANT CHANGE UP (ref 0–1)
BILIRUB DIRECT SERPL-MCNC: <0.2 MG/DL — SIGNIFICANT CHANGE UP (ref 0–0.2)
BILIRUB INDIRECT FLD-MCNC: >0.1 MG/DL — LOW (ref 0.2–1.2)
BILIRUB SERPL-MCNC: 0.3 MG/DL — SIGNIFICANT CHANGE UP (ref 0.2–1.2)
BUN SERPL-MCNC: 34 MG/DL — HIGH (ref 10–20)
CALCIUM SERPL-MCNC: 9.1 MG/DL — SIGNIFICANT CHANGE UP (ref 8.5–10.1)
CHLORIDE SERPL-SCNC: 109 MMOL/L — SIGNIFICANT CHANGE UP (ref 98–110)
CO2 SERPL-SCNC: 24 MMOL/L — SIGNIFICANT CHANGE UP (ref 17–32)
CREAT SERPL-MCNC: 1.2 MG/DL — SIGNIFICANT CHANGE UP (ref 0.7–1.5)
EOSINOPHIL # BLD AUTO: 0 K/UL — SIGNIFICANT CHANGE UP (ref 0–0.7)
EOSINOPHIL NFR BLD AUTO: 0 % — SIGNIFICANT CHANGE UP (ref 0–8)
GLUCOSE SERPL-MCNC: 103 MG/DL — HIGH (ref 70–99)
HCT VFR BLD CALC: 34.7 % — LOW (ref 37–47)
HGB BLD-MCNC: 11.2 G/DL — LOW (ref 12–16)
IMM GRANULOCYTES NFR BLD AUTO: 0.4 % — HIGH (ref 0.1–0.3)
LYMPHOCYTES # BLD AUTO: 1.11 K/UL — LOW (ref 1.2–3.4)
LYMPHOCYTES # BLD AUTO: 7.5 % — LOW (ref 20.5–51.1)
MAGNESIUM SERPL-MCNC: 2 MG/DL — SIGNIFICANT CHANGE UP (ref 1.8–2.4)
MCHC RBC-ENTMCNC: 31.2 PG — HIGH (ref 27–31)
MCHC RBC-ENTMCNC: 32.3 G/DL — SIGNIFICANT CHANGE UP (ref 32–37)
MCV RBC AUTO: 96.7 FL — SIGNIFICANT CHANGE UP (ref 81–99)
MONOCYTES # BLD AUTO: 0.97 K/UL — HIGH (ref 0.1–0.6)
MONOCYTES NFR BLD AUTO: 6.6 % — SIGNIFICANT CHANGE UP (ref 1.7–9.3)
NEUTROPHILS # BLD AUTO: 12.57 K/UL — HIGH (ref 1.4–6.5)
NEUTROPHILS NFR BLD AUTO: 85.4 % — HIGH (ref 42.2–75.2)
PLATELET # BLD AUTO: 160 K/UL — SIGNIFICANT CHANGE UP (ref 130–400)
POTASSIUM SERPL-MCNC: 4.1 MMOL/L — SIGNIFICANT CHANGE UP (ref 3.5–5)
POTASSIUM SERPL-SCNC: 4.1 MMOL/L — SIGNIFICANT CHANGE UP (ref 3.5–5)
PROT SERPL-MCNC: 5.7 G/DL — LOW (ref 6–8)
RBC # BLD: 3.59 M/UL — LOW (ref 4.2–5.4)
RBC # FLD: 15.3 % — HIGH (ref 11.5–14.5)
SODIUM SERPL-SCNC: 145 MMOL/L — SIGNIFICANT CHANGE UP (ref 135–146)
WBC # BLD: 14.72 K/UL — HIGH (ref 4.8–10.8)
WBC # FLD AUTO: 14.72 K/UL — HIGH (ref 4.8–10.8)

## 2018-11-19 RX ORDER — METOPROLOL TARTRATE 50 MG
25 TABLET ORAL
Qty: 0 | Refills: 0 | Status: DISCONTINUED | OUTPATIENT
Start: 2018-11-19 | End: 2018-11-21

## 2018-11-19 RX ORDER — POLYETHYLENE GLYCOL 3350 17 G/17G
17 POWDER, FOR SOLUTION ORAL
Qty: 0 | Refills: 0 | Status: DISCONTINUED | OUTPATIENT
Start: 2018-11-19 | End: 2018-11-22

## 2018-11-19 RX ADMIN — Medication 3 MILLILITER(S): at 19:42

## 2018-11-19 RX ADMIN — POLYETHYLENE GLYCOL 3350 17 GRAM(S): 17 POWDER, FOR SOLUTION ORAL at 15:45

## 2018-11-19 RX ADMIN — ATORVASTATIN CALCIUM 80 MILLIGRAM(S): 80 TABLET, FILM COATED ORAL at 21:03

## 2018-11-19 RX ADMIN — Medication 25 MILLIGRAM(S): at 18:28

## 2018-11-19 RX ADMIN — Medication 60 MILLIGRAM(S): at 05:06

## 2018-11-19 RX ADMIN — GABAPENTIN 300 MILLIGRAM(S): 400 CAPSULE ORAL at 21:03

## 2018-11-19 RX ADMIN — Medication 0.25 MILLIGRAM(S): at 21:05

## 2018-11-19 RX ADMIN — Medication 3 MILLILITER(S): at 14:10

## 2018-11-19 RX ADMIN — HEPARIN SODIUM 5000 UNIT(S): 5000 INJECTION INTRAVENOUS; SUBCUTANEOUS at 05:07

## 2018-11-19 RX ADMIN — Medication 81 MILLIGRAM(S): at 11:58

## 2018-11-19 RX ADMIN — ARFORMOTEROL TARTRATE 15 MICROGRAM(S): 15 SOLUTION RESPIRATORY (INHALATION) at 21:08

## 2018-11-19 RX ADMIN — FAMOTIDINE 20 MILLIGRAM(S): 10 INJECTION INTRAVENOUS at 11:58

## 2018-11-19 RX ADMIN — HEPARIN SODIUM 5000 UNIT(S): 5000 INJECTION INTRAVENOUS; SUBCUTANEOUS at 21:03

## 2018-11-19 RX ADMIN — PANTOPRAZOLE SODIUM 40 MILLIGRAM(S): 20 TABLET, DELAYED RELEASE ORAL at 05:06

## 2018-11-19 RX ADMIN — HEPARIN SODIUM 5000 UNIT(S): 5000 INJECTION INTRAVENOUS; SUBCUTANEOUS at 14:02

## 2018-11-19 RX ADMIN — Medication 0.5 MILLIGRAM(S): at 06:03

## 2018-11-19 RX ADMIN — GABAPENTIN 300 MILLIGRAM(S): 400 CAPSULE ORAL at 14:02

## 2018-11-19 RX ADMIN — Medication 1 APPLICATION(S): at 18:28

## 2018-11-19 RX ADMIN — GABAPENTIN 300 MILLIGRAM(S): 400 CAPSULE ORAL at 05:06

## 2018-11-19 RX ADMIN — CITALOPRAM 30 MILLIGRAM(S): 10 TABLET, FILM COATED ORAL at 11:58

## 2018-11-19 RX ADMIN — Medication 0.5 MILLIGRAM(S): at 18:28

## 2018-11-19 RX ADMIN — Medication 1 APPLICATION(S): at 05:08

## 2018-11-19 RX ADMIN — Medication 25 MILLIGRAM(S): at 11:58

## 2018-11-19 RX ADMIN — Medication 3 MILLILITER(S): at 06:55

## 2018-11-19 RX ADMIN — Medication 20 MILLIEQUIVALENT(S): at 11:58

## 2018-11-19 RX ADMIN — Medication 3 MILLILITER(S): at 23:37

## 2018-11-19 NOTE — CONSULT NOTE ADULT - SUBJECTIVE AND OBJECTIVE BOX
HPI:  73 year old female with known hx of COPD not on home O2, HTN, Depression, LVH, TIA 20 years ago, hyperlipidemia, CAD presented for epigastric pain associated with dizziness. In ED patient was bradycardic/ hypotensive. Treated with atropine with no response, then placed on Dopamine and Levophed with good response. As per family similar episode few weeks ago was bradycardic/ hypotensive found to be in CHF. Of note the patient takes verapamil and metoprolol at home.    PTN  REFERRED TO ACUTE  REHAB  FOR  EVAL AND  TX   PAST MEDICAL & SURGICAL HISTORY:  PVD (peripheral vascular disease)  Other emphysema  Other cardiomyopathy  TIA (transient ischemic attack)  COPD (chronic obstructive pulmonary disease)  Depression  Hypertension  History of cholecystectomy      Hospital Course:    TODAY'S SUBJECTIVE & REVIEW OF SYMPTOMS:     Constitutional WNL   Cardio WNL   Resp WNL   GI WNL  Heme WNL  Endo WNL  Skin WNL  MSK WNL  Neuro WNL  Cognitive WNL  Psych WNL      MEDICATIONS  (STANDING):  ALBUTerol/ipratropium for Nebulization 3 milliLiter(s) Nebulizer every 6 hours  arformoterol for Nebulization 15 MICROGram(s) Nebulizer every 12 hours  aspirin enteric coated 81 milliGRAM(s) Oral daily  atorvastatin 80 milliGRAM(s) Oral at bedtime  buDESOnide   0.25 milliGRAM(s) Respule 0.25 milliGRAM(s) Inhalation two times a day  citalopram 30 milliGRAM(s) Oral daily  famotidine    Tablet 20 milliGRAM(s) Oral daily  gabapentin 300 milliGRAM(s) Oral three times a day  glucagon  Injectable 5 milliGRAM(s) IV Push once  heparin  Injectable 5000 Unit(s) SubCutaneous every 8 hours  LORazepam     Tablet 0.5 milliGRAM(s) Oral two times a day  metoprolol tartrate 25 milliGRAM(s) Oral four times a day  potassium chloride    Tablet ER 20 milliEquivalent(s) Oral daily  predniSONE   Tablet 60 milliGRAM(s) Oral daily  silver sulfADIAZINE 1% Cream 1 Application(s) Topical every 12 hours    MEDICATIONS  (PRN):  ibuprofen  Tablet. 400 milliGRAM(s) Oral every 8 hours PRN Moderate Pain (4 - 6)  polyethylene glycol 3350 17 Gram(s) Oral two times a day PRN Constipation      FAMILY HISTORY:  No pertinent family history in first degree relatives      Allergies    albuterol (Unknown)  codeine (Other (Moderate))  Depakote (Unknown)    Intolerances        SOCIAL HISTORY:    [  ] Etoh  [  ] Smoking  [  ] Substance abuse     Home Environment:  [  ] Home Alone  [  x] Lives with Family  [  ] Home Health Aid    Dwelling:  [  ] Apartment  [ x ] Private House  [  ] Adult Home  [  ] Skilled Nursing Facility      [  ] Short Term  [  ] Long Term  [ x ] Stairs       Elevator [  ]    FUNCTIONAL STATUS PTA: (Check all that apply)  Ambulation: [ x  ]Independent    [  ] Dependent     [  ] Non-Ambulatory  Assistive Device: [  ] SA Cane  [  ]  Q Cane  [  ] Walker  [  ]  Wheelchair  ADL : [ x ] Independent  [  ]  Dependent       Vital Signs Last 24 Hrs  T(C): 37.1 (19 Nov 2018 15:01), Max: 37.1 (19 Nov 2018 15:01)  T(F): 98.7 (19 Nov 2018 15:01), Max: 98.7 (19 Nov 2018 15:01)  HR: 87 (19 Nov 2018 11:55) (85 - 98)  BP: 136/66 (19 Nov 2018 11:55) (93/45 - 136/66)  BP(mean): 95 (19 Nov 2018 11:55) (81 - 95)  RR: 16 (19 Nov 2018 15:01) (13 - 22)  SpO2: 96% (19 Nov 2018 11:55) (96% - 100%)      PHYSICAL EXAM: Alert & Oriented X3  GENERAL: NAD, well-groomed, well-developed  HEAD:  Atraumatic, Normocephalic  EYES: EOMI, PERRLA, conjunctiva and sclera clear  NECK: Supple, No JVD, Normal thyroid  CHEST/LUNG: Clear to percussion bilaterally; No rales, rhonchi, wheezing, or rubs  HEART: Regular rate and rhythm; No murmurs, rubs, or gallops  ABDOMEN: Soft, Nontender, Nondistended; Bowel sounds present  EXTREMITIES:  2+ Peripheral Pulses, No clubbing, cyanosis, or edema    NERVOUS SYSTEM:  Cranial Nerves 2-12 intact [x  ] Abnormal  [  ]  ROM: WFL all extremities [x  ]  Abnormal [  ]  Motor Strength: WFL all extremities  [ x ]  Abnormal [  ]  Sensation: intact to light touch [x  ] Abnormal [  ]  Reflexes: Symmetric [ x ]  Abnormal [  ]    FUNCTIONAL STATUS:  Bed Mobility: Independent [  ]  Supervision [ x ]  Needs Assistance [  ]  N/A [  ]  Transfers: Independent [  ]  Supervision [ x ]  Needs Assistance [  ]  N/A [  ]   Ambulation: Independent [  ]  Supervision [ x ]  Needs Assistance [  ]  N/A [  ]  ADL: Independent [ x ] Requires Assistance [ x ] N/A [  ]  SEE PT IE NOTES    LABS:                        11.2   14.72 )-----------( 160      ( 19 Nov 2018 06:07 )             34.7     11-19    145  |  109  |  34<H>  ----------------------------<  103<H>  4.1   |  24  |  1.2    Ca    9.1      19 Nov 2018 06:07  Mg     2.0     11-19    TPro  5.7<L>  /  Alb  3.5  /  TBili  0.3  /  DBili  <0.2  /  AST  42<H>  /  ALT  87<H>  /  AlkPhos  75  11-19          RADIOLOGY & ADDITIONAL STUDIES:    Assesment:

## 2018-11-19 NOTE — CONSULT NOTE ADULT - ASSESSMENT
IMPRESSION: Rehab of 74 y/o  f  rehab  for  debility      PRECAUTIONS: [  ] Cardiac  [  ] Respiratory  [  ] Seizures [  ] Contact Isolation  [  ] Droplet Isolation  [  ] Other    Weight Bearing Status:     RECOMMENDATION:    Out of Bed to Chair     DVT/Decubiti Prophylaxis    REHAB PLAN:     [ xx  ] Bedside P/T 3-5 times a week   [   ]   Bedside O/T  2-3 times a week             [   ] No Rehab Therapy Indicated                   [   ]  Speech Therapy   Conditioning/ROM                                    ADL  Bed Mobility                                               Conditioning/ROM  Transfers                                                     Bed Mobility  Sitting /Standing Balance                         Transfers                                        Gait Training                                               Sitting/Standing Balance  Stair Training [   ]Applicable                    Home equipment Eval                                                                        Splinting  [   ] Only      GOALS:   ADL   [  x ]   Independent                    Transfers  [  x ] Independent                          Ambulation  [  x ] Independent     [   x ] With device                            [   ]  CG                                                         [   ]  CG                                                                  [   ] CG                            [    ] Min A                                                   [   ] Min A                                                              [   ] Min  A          DISCHARGE PLAN:   [   ]  Good candidate for Intensive Rehabilitation/Hospital based-[facility]A [facility]                                             Will tolerate 3hrs Intensive Rehab Daily                                       [    ]  Short Term Rehab in Skilled Nursing Facility                                       [   xx ]  Home with Outpatient or  services                                         [    ]  Possible Candidate for Intensive Hospital based Rehab

## 2018-11-19 NOTE — PROGRESS NOTE ADULT - ASSESSMENT
Patient SOB.  BP was low. Better now. On antibiotics . Check labs. Check RA pulse oximeter. When stable ambulate. ? SHEILA Freeman in again.  Resume beta. No verapramil.

## 2018-11-19 NOTE — PROGRESS NOTE ADULT - ASSESSMENT
SHAUN COATES 73y Female  MRN#: [id number]   CODE STATUS: FULL CODE      SUBJECTIVE  73 year old female with known hx of COPD not on home O2, HTN, Depression, LVH, TIA 20 years ago, hyperlipidemia, CAD presented for epigastric pain associated with dizziness. In ED patient was bradycardic/ hypotensive. Treated with atropine with no response, then placed on Dopamine and Levophed with good response. As per family similar episode few weeks ago was bradycardic/ hypotensive found to be in CHF. Of note the patient takes verapamil and metoprolol at home.  Hospital course has been complicated by burn here secondary to spilling tea on her groin. Today is hospital day 2d, and this morning she is feeling ok and reports no overnight events.     Present Today:           Stephens Catheter (X)No/ ()Yes? Indication:          Central Line (X)No/ ()Yes? Indication:          IV Fluids (X)No/ ()Yes? Type:  Rate:  Indication:    OBJECTIVE  PAST MEDICAL & SURGICAL HISTORY  PVD (peripheral vascular disease)  Other emphysema  Other cardiomyopathy  TIA (transient ischemic attack)  COPD (chronic obstructive pulmonary disease)  Depression  Hypertension  History of cholecystectomy    ALLERGIES:  albuterol (Unknown)  codeine (Other (Moderate))  Depakote (Unknown)    MEDICATIONS:  STANDING MEDICATIONS  ALBUTerol/ipratropium for Nebulization 3 milliLiter(s) Nebulizer every 6 hours  arformoterol for Nebulization 15 MICROGram(s) Nebulizer every 12 hours  aspirin enteric coated 81 milliGRAM(s) Oral daily  atorvastatin 80 milliGRAM(s) Oral at bedtime  buDESOnide   0.25 milliGRAM(s) Respule 0.25 milliGRAM(s) Inhalation two times a day  citalopram 30 milliGRAM(s) Oral daily  famotidine    Tablet 20 milliGRAM(s) Oral daily  gabapentin 300 milliGRAM(s) Oral three times a day  glucagon  Injectable 5 milliGRAM(s) IV Push once  heparin  Injectable 5000 Unit(s) SubCutaneous every 8 hours  LORazepam     Tablet 0.5 milliGRAM(s) Oral two times a day  metoprolol tartrate 25 milliGRAM(s) Oral four times a day  pantoprazole  Injectable 40 milliGRAM(s) IV Push two times a day  potassium chloride    Tablet ER 20 milliEquivalent(s) Oral daily  predniSONE   Tablet 60 milliGRAM(s) Oral daily  silver sulfADIAZINE 1% Cream 1 Application(s) Topical every 12 hours    PRN MEDICATIONS  ibuprofen  Tablet. 400 milliGRAM(s) Oral every 8 hours PRN      VITAL SIGNS: Last 24 Hours  T(C): 36.2 (19 Nov 2018 07:01), Max: 36.7 (18 Nov 2018 15:01)  T(F): 97.1 (19 Nov 2018 07:01), Max: 98 (18 Nov 2018 15:01)  HR: 98 (19 Nov 2018 08:52) (85 - 98)  BP: 134/61 (19 Nov 2018 08:52) (93/45 - 134/61)  BP(mean): 88 (19 Nov 2018 08:52) (81 - 88)  RR: 22 (19 Nov 2018 08:52) (13 - 35)  SpO2: 99% (19 Nov 2018 08:52) (96% - 100%)    LABS:                        11.2   14.72 )-----------( 160      ( 19 Nov 2018 06:07 )             34.7     11-19    145  |  109  |  34<H>  ----------------------------<  103<H>  4.1   |  24  |  1.2    Ca    9.1      19 Nov 2018 06:07  Mg     2.0     11-19    TPro  5.7<L>  /  Alb  3.5  /  TBili  0.3  /  DBili  <0.2  /  AST  42<H>  /  ALT  87<H>  /  AlkPhos  75  11-19    Culture - Urine (collected 17 Nov 2018 06:30)  Source: .Urine Catheterized  Final Report (18 Nov 2018 19:41):    No growth    Culture - Blood (collected 17 Nov 2018 06:19)  Source: .Blood None  Preliminary Report (18 Nov 2018 15:02):    No growth to date.    RADIOLOGY:  No new images to review at this time    PHYSICAL EXAM:  GENERAL: NAD, well-developed, AAOx3  HEENT:  Atraumatic, Normocephalic. EOMI, PERRLA, conjunctiva and sclera clear, No JVD  PULMONARY: Clear to auscultation bilaterally; No wheeze  CARDIOVASCULAR: Regular rate and rhythm; No murmurs, rubs, or gallops  GASTROINTESTINAL: Soft, Nontender, Nondistended; Bowel sounds present  MUSCULOSKELETAL:  2+ Peripheral Pulses, No clubbing, cyanosis, or edema  NEUROLOGY: non-focal  SKIN: Positive for bilateral intact blisters in groin bilaterally    ADMISSION SUMMARY  73 year old female with known hx of COPD not on home O2, HTN, Depression, LVH, TIA 20 years ago, hyperlipidemia, CAD presented for epigastric pain associated with dizziness. In ED patient was bradycardic/ hypotensive. Hospital course has been complicated by burn here secondary to spilling tea on her groin.      ASSESSMENT & PLAN    1. Bradycardia + dizziness + epispastic pain on metoprolol/verapamil with known hx of severe PVD and COPD  - Pulmonary Following; Cardio Consult ordered  - Prednisone neb q 6 h, BiPAP at night continue home inhalers  - HR and BP improved significantly    2. Groin burn secondary to spilled tea  - Continue silvadene cream  - Burn consult pending    3. HTN - better controlled now that hypotension resolved  - Restarted on Losartan and metoprolol; continue to hold HCTZ, Verapamil    4. Urinary retention - TOV today to remove stephens catheter    5. DVT Prophylaxis - subq heparin    Present today:  ( ) Congestive Heart Failure, Yes? ( )Acute / ( )Acute on Chronic / ( )Chronic  :  ( )Systolic / ( )Diastolic               Plan:  ( ) Complicated Pneumonia, Type?  ( )Parapneumonic effusion / ( )Abscess / ( ) Multilobar / ( )Other               Plan:  ( ) Morbid Obesity, Yes? BMI:               Plan:  ( ) Functional Quadriplegia               Plan:  ( ) Encephalopathy               Plan:    (X ) Discussion with patient and/or family regarding goals of care  (X ) Discussed Case and Plan with Medical Attending    # Planned Disposition: home v. snf SHAUN COATES 73y Female  MRN#: [id number]   CODE STATUS: FULL CODE      SUBJECTIVE  73 year old female with known hx of COPD not on home O2, HTN, Depression, LVH, TIA 20 years ago, hyperlipidemia, CAD presented for epigastric pain associated with dizziness. In ED patient was bradycardic/ hypotensive. Treated with atropine with no response, then placed on Dopamine and Levophed with good response. As per family similar episode few weeks ago was bradycardic/ hypotensive found to be in CHF. Of note the patient takes verapamil and metoprolol at home.  Hospital course has been complicated by burn here secondary to spilling tea on her groin. Today is hospital day 2d, and this morning she is feeling ok and reports no overnight events.     Present Today:           Stephens Catheter (X)No/ ()Yes? Indication:          Central Line (X)No/ ()Yes? Indication:          IV Fluids (X)No/ ()Yes? Type:  Rate:  Indication:    OBJECTIVE  PAST MEDICAL & SURGICAL HISTORY  PVD (peripheral vascular disease)  Other emphysema  Other cardiomyopathy  TIA (transient ischemic attack)  COPD (chronic obstructive pulmonary disease)  Depression  Hypertension  History of cholecystectomy    ALLERGIES:  albuterol (Unknown)  codeine (Other (Moderate))  Depakote (Unknown)    MEDICATIONS:  STANDING MEDICATIONS  ALBUTerol/ipratropium for Nebulization 3 milliLiter(s) Nebulizer every 6 hours  arformoterol for Nebulization 15 MICROGram(s) Nebulizer every 12 hours  aspirin enteric coated 81 milliGRAM(s) Oral daily  atorvastatin 80 milliGRAM(s) Oral at bedtime  buDESOnide   0.25 milliGRAM(s) Respule 0.25 milliGRAM(s) Inhalation two times a day  citalopram 30 milliGRAM(s) Oral daily  famotidine    Tablet 20 milliGRAM(s) Oral daily  gabapentin 300 milliGRAM(s) Oral three times a day  glucagon  Injectable 5 milliGRAM(s) IV Push once  heparin  Injectable 5000 Unit(s) SubCutaneous every 8 hours  LORazepam     Tablet 0.5 milliGRAM(s) Oral two times a day  metoprolol tartrate 25 milliGRAM(s) Oral four times a day  pantoprazole  Injectable 40 milliGRAM(s) IV Push two times a day  potassium chloride    Tablet ER 20 milliEquivalent(s) Oral daily  predniSONE   Tablet 60 milliGRAM(s) Oral daily  silver sulfADIAZINE 1% Cream 1 Application(s) Topical every 12 hours    PRN MEDICATIONS  ibuprofen  Tablet. 400 milliGRAM(s) Oral every 8 hours PRN      VITAL SIGNS: Last 24 Hours  T(C): 36.2 (19 Nov 2018 07:01), Max: 36.7 (18 Nov 2018 15:01)  T(F): 97.1 (19 Nov 2018 07:01), Max: 98 (18 Nov 2018 15:01)  HR: 98 (19 Nov 2018 08:52) (85 - 98)  BP: 134/61 (19 Nov 2018 08:52) (93/45 - 134/61)  BP(mean): 88 (19 Nov 2018 08:52) (81 - 88)  RR: 22 (19 Nov 2018 08:52) (13 - 35)  SpO2: 99% (19 Nov 2018 08:52) (96% - 100%)    LABS:                        11.2   14.72 )-----------( 160      ( 19 Nov 2018 06:07 )             34.7     11-19    145  |  109  |  34<H>  ----------------------------<  103<H>  4.1   |  24  |  1.2    Ca    9.1      19 Nov 2018 06:07  Mg     2.0     11-19    TPro  5.7<L>  /  Alb  3.5  /  TBili  0.3  /  DBili  <0.2  /  AST  42<H>  /  ALT  87<H>  /  AlkPhos  75  11-19    Culture - Urine (collected 17 Nov 2018 06:30)  Source: .Urine Catheterized  Final Report (18 Nov 2018 19:41):    No growth    Culture - Blood (collected 17 Nov 2018 06:19)  Source: .Blood None  Preliminary Report (18 Nov 2018 15:02):    No growth to date.    RADIOLOGY:  No new images to review at this time    PHYSICAL EXAM:  GENERAL: NAD, well-developed, AAOx3  HEENT:  Atraumatic, Normocephalic. EOMI, PERRLA, conjunctiva and sclera clear, No JVD  PULMONARY: Clear to auscultation bilaterally; No wheeze  CARDIOVASCULAR: Regular rate and rhythm; No murmurs, rubs, or gallops  GASTROINTESTINAL: Soft, Nontender, Nondistended; Bowel sounds present  MUSCULOSKELETAL:  2+ Peripheral Pulses, No clubbing, cyanosis, or edema  NEUROLOGY: non-focal  SKIN: Positive for bilateral intact blisters in groin bilaterally    ADMISSION SUMMARY  73 year old female with known hx of COPD not on home O2, HTN, Depression, LVH, TIA 20 years ago, hyperlipidemia, CAD presented for epigastric pain associated with dizziness. In ED patient was bradycardic/ hypotensive. Hospital course has been complicated by burn here secondary to spilling tea on her groin.      ASSESSMENT & PLAN    1. Bradycardia + dizziness + epispastic pain on metoprolol/verapamil with known hx of severe PVD and COPD  - Pulmonary Following;   - Cardio Consult appreciated - restarted on BB  - Prednisone neb q 6 h, BiPAP at night continue home inhalers  - HR and BP improved significantly  - PT, OOBTC    2. Groin burn secondary to spilled tea  - Continue silvadene cream  - Burn consult pending    3. HTN - better controlled now that hypotension resolved  - Restarted on Losartan and metoprolol; continue to hold HCTZ, Verapamil    4. Urinary retention - TOV today to remove stephens catheter    5. DVT Prophylaxis - subq heparin    Present today:  ( ) Congestive Heart Failure, Yes? ( )Acute / ( )Acute on Chronic / ( )Chronic  :  ( )Systolic / ( )Diastolic               Plan:  ( ) Complicated Pneumonia, Type?  ( )Parapneumonic effusion / ( )Abscess / ( ) Multilobar / ( )Other               Plan:  ( ) Morbid Obesity, Yes? BMI:               Plan:  ( ) Functional Quadriplegia               Plan:  ( ) Encephalopathy               Plan:    (X ) Discussion with patient and/or family regarding goals of care  (X ) Discussed Case and Plan with Medical Attending    # Planned Disposition: home v. snf SHAUN COATES 73y Female  MRN#: [id number]   CODE STATUS: FULL CODE      SUBJECTIVE  73 year old female with known hx of COPD not on home O2, HTN, Depression, LVH, TIA 20 years ago, hyperlipidemia, CAD presented for epigastric pain associated with dizziness. In ED patient was bradycardic/ hypotensive. Treated with atropine with no response, then placed on Dopamine and Levophed with good response. As per family similar episode few weeks ago was bradycardic/ hypotensive found to be in CHF. Of note the patient takes verapamil and metoprolol at home. Hospital course has been complicated by burn here secondary to spilling tea on her groin. Today is hospital day 3d, and this morning she is feeling ok and reports no overnight events.     Present Today:           Stephens Catheter (X)No/ ()Yes? Indication:           Central Line (X)No/ ()Yes? Indication:          IV Fluids (X)No/ ()Yes? Type:  Rate:  Indication:    OBJECTIVE  PAST MEDICAL & SURGICAL HISTORY  PVD (peripheral vascular disease)  Other emphysema  Other cardiomyopathy  TIA (transient ischemic attack)  COPD (chronic obstructive pulmonary disease)  Depression  Hypertension  History of cholecystectomy    ALLERGIES:  albuterol (Unknown)  codeine (Other (Moderate))  Depakote (Unknown)    MEDICATIONS:  STANDING MEDICATIONS  ALBUTerol/ipratropium for Nebulization 3 milliLiter(s) Nebulizer every 6 hours  arformoterol for Nebulization 15 MICROGram(s) Nebulizer every 12 hours  aspirin enteric coated 81 milliGRAM(s) Oral daily  atorvastatin 80 milliGRAM(s) Oral at bedtime  buDESOnide   0.25 milliGRAM(s) Respule 0.25 milliGRAM(s) Inhalation two times a day  citalopram 30 milliGRAM(s) Oral daily  famotidine    Tablet 20 milliGRAM(s) Oral daily  gabapentin 300 milliGRAM(s) Oral three times a day  glucagon  Injectable 5 milliGRAM(s) IV Push once  heparin  Injectable 5000 Unit(s) SubCutaneous every 8 hours  LORazepam     Tablet 0.5 milliGRAM(s) Oral two times a day  metoprolol tartrate 25 milliGRAM(s) Oral four times a day  pantoprazole  Injectable 40 milliGRAM(s) IV Push two times a day  potassium chloride    Tablet ER 20 milliEquivalent(s) Oral daily  predniSONE   Tablet 60 milliGRAM(s) Oral daily  silver sulfADIAZINE 1% Cream 1 Application(s) Topical every 12 hours    PRN MEDICATIONS  ibuprofen  Tablet. 400 milliGRAM(s) Oral every 8 hours PRN      VITAL SIGNS: Last 24 Hours  T(C): 36.2 (19 Nov 2018 07:01), Max: 36.7 (18 Nov 2018 15:01)  T(F): 97.1 (19 Nov 2018 07:01), Max: 98 (18 Nov 2018 15:01)  HR: 98 (19 Nov 2018 08:52) (85 - 98)  BP: 134/61 (19 Nov 2018 08:52) (93/45 - 134/61)  BP(mean): 88 (19 Nov 2018 08:52) (81 - 88)  RR: 22 (19 Nov 2018 08:52) (13 - 35)  SpO2: 99% (19 Nov 2018 08:52) (96% - 100%)    LABS:                        11.2   14.72 )-----------( 160      ( 19 Nov 2018 06:07 )             34.7     11-19    145  |  109  |  34<H>  ----------------------------<  103<H>  4.1   |  24  |  1.2    Ca    9.1      19 Nov 2018 06:07  Mg     2.0     11-19    TPro  5.7<L>  /  Alb  3.5  /  TBili  0.3  /  DBili  <0.2  /  AST  42<H>  /  ALT  87<H>  /  AlkPhos  75  11-19    Culture - Urine (collected 17 Nov 2018 06:30)  Source: .Urine Catheterized  Final Report (18 Nov 2018 19:41):    No growth    Culture - Blood (collected 17 Nov 2018 06:19)  Source: .Blood None  Preliminary Report (18 Nov 2018 15:02):    No growth to date.    RADIOLOGY:  No new images to review at this time    PHYSICAL EXAM:  GENERAL: NAD, well-developed, AAOx3  HEENT:  Atraumatic, Normocephalic. EOMI, PERRLA, conjunctiva and sclera clear, No JVD  PULMONARY: Clear to auscultation bilaterally; No wheeze  CARDIOVASCULAR: Regular rate and rhythm; No murmurs, rubs, or gallops  GASTROINTESTINAL: Soft, Nontender, Nondistended; Bowel sounds present  MUSCULOSKELETAL:  2+ Peripheral Pulses, No clubbing, cyanosis, or edema  NEUROLOGY: non-focal  SKIN: Positive for bilateral intact blisters in groin bilaterally    ADMISSION SUMMARY  73 year old female with known hx of COPD not on home O2, HTN, Depression, LVH, TIA 20 years ago, hyperlipidemia, CAD presented for epigastric pain associated with dizziness. In ED patient was bradycardic/ hypotensive. Hospital course has been complicated by burn here secondary to spilling tea on her groin.      ASSESSMENT & PLAN    1. Bradycardia + dizziness + epispastic pain likely medication induced due to metoprolol/verapamil with known hx of severe PVD and COPD  - Pulmonary Following;   - Cardio Consult appreciated - restarted on BB  - Prednisone neb q 6 h, BiPAP at night continue home inhalers  - HR and BP improved significantly  - PT, OOBTC    2. Groin burn secondary to spilled tea  - Continue silvadene cream  - Burn consult pending    3. HTN - better controlled now that hypotension resolved  - Restarted on Losartan and metoprolol; continue to hold HCTZ, Verapamil    4. Urinary retention - TOV today to remove stephens catheter    5. JOYCE on admission, likely secondary to hypotension/pre-renal etiology  - SCr of 2.0 on admission, now improved to 1.2  - Back at baseline, avoid nephrotoxic substances    6. DVT Prophylaxis - subq heparin    Present today:  ( ) Congestive Heart Failure, Yes? ( )Acute / ( )Acute on Chronic / ( )Chronic  :  ( )Systolic / ( )Diastolic               Plan:  ( ) Complicated Pneumonia, Type?  ( )Parapneumonic effusion / ( )Abscess / ( ) Multilobar / ( )Other               Plan:  ( ) Morbid Obesity, Yes? BMI:               Plan:  ( ) Functional Quadriplegia               Plan:  ( ) Encephalopathy               Plan:    (X ) Discussion with patient and/or family regarding goals of care  (X ) Discussed Case and Plan with Medical Attending    # Planned Disposition: home v. snf

## 2018-11-19 NOTE — PROGRESS NOTE ADULT - SUBJECTIVE AND OBJECTIVE BOX
Patient is a 73y old  Female who presents with a chief complaint of Symptomatic Bradycardia (18 Nov 2018 11:08)      T(F): 96.8 (11-18-18 @ 23:00), Max: 98.3 (11-18-18 @ 11:01)  HR: 89 (11-19-18 @ 05:01)  BP: 125/58 (11-19-18 @ 05:01)  RR: 19 (11-19-18 @ 05:01)  SpO2: 100% (11-19-18 @ 05:01) (96% - 100%)    PHYSICAL EXAM:  GENERAL: NAD, well-groomed, well-developed  HEAD:  Atraumatic, Normocephalic  EYES: EOMI, PERRLA, conjunctiva and sclera clear  ENMT: No tonsillar erythema, exudates, or enlargement; Moist mucous membranes, Good dentition, No lesions  NECK: Supple, No JVD, Normal thyroid  NERVOUS SYSTEM:  Alert & Oriented X3,  Motor Strength 5/5 B/L upper and lower extremities  CHEST/LUNG: Clear to percussion bilaterally; No rales, rhonchi, wheezing, or rubs  HEART: Regular rate and rhythm; No murmurs, rubs, or gallops  ABDOMEN: Soft, Nontender, Nondistended; Bowel sounds present  EXTREMITIES:   No clubbing, cyanosis, or edema  LYMPH: No lymphadenopathy noted  SKIN: No rashes or lesions    labs  11-18    143  |  108  |  38<H>  ----------------------------<  216<H>  4.2   |  20  |  1.2    Ca    9.0      18 Nov 2018 06:14  Mg     2.1     11-18                            10.6   14.18 )-----------( 171      ( 18 Nov 2018 06:14 )             32.7       Culture - Urine (collected 17 Nov 2018 06:30)  Source: .Urine Catheterized  Final Report (18 Nov 2018 19:41):    No growth    Culture - Blood (collected 17 Nov 2018 06:19)  Source: .Blood None  Preliminary Report (18 Nov 2018 15:02):    No growth to date.              ALBUTerol/ipratropium for Nebulization 3 milliLiter(s) Nebulizer every 6 hours  arformoterol for Nebulization 15 MICROGram(s) Nebulizer every 12 hours  aspirin enteric coated 81 milliGRAM(s) Oral daily  atorvastatin 80 milliGRAM(s) Oral at bedtime  buDESOnide   0.25 milliGRAM(s) Respule 0.25 milliGRAM(s) Inhalation two times a day  citalopram 30 milliGRAM(s) Oral daily  famotidine    Tablet 20 milliGRAM(s) Oral daily  gabapentin 300 milliGRAM(s) Oral three times a day  glucagon  Injectable 5 milliGRAM(s) IV Push once  heparin  Injectable 5000 Unit(s) SubCutaneous every 8 hours  ibuprofen  Tablet. 400 milliGRAM(s) Oral every 8 hours PRN  LORazepam     Tablet 0.5 milliGRAM(s) Oral two times a day  pantoprazole  Injectable 40 milliGRAM(s) IV Push two times a day  potassium chloride    Tablet ER 20 milliEquivalent(s) Oral daily  predniSONE   Tablet 60 milliGRAM(s) Oral daily  silver sulfADIAZINE 1% Cream 1 Application(s) Topical every 12 hours

## 2018-11-20 LAB
ANION GAP SERPL CALC-SCNC: 13 MMOL/L — SIGNIFICANT CHANGE UP (ref 7–14)
BUN SERPL-MCNC: 37 MG/DL — HIGH (ref 10–20)
CALCIUM SERPL-MCNC: 8.4 MG/DL — LOW (ref 8.5–10.1)
CHLORIDE SERPL-SCNC: 106 MMOL/L — SIGNIFICANT CHANGE UP (ref 98–110)
CO2 SERPL-SCNC: 23 MMOL/L — SIGNIFICANT CHANGE UP (ref 17–32)
CREAT SERPL-MCNC: 0.9 MG/DL — SIGNIFICANT CHANGE UP (ref 0.7–1.5)
GLUCOSE SERPL-MCNC: 83 MG/DL — SIGNIFICANT CHANGE UP (ref 70–99)
HCT VFR BLD CALC: 32.6 % — LOW (ref 37–47)
HGB BLD-MCNC: 10.4 G/DL — LOW (ref 12–16)
MCHC RBC-ENTMCNC: 30.5 PG — SIGNIFICANT CHANGE UP (ref 27–31)
MCHC RBC-ENTMCNC: 31.9 G/DL — LOW (ref 32–37)
MCV RBC AUTO: 95.6 FL — SIGNIFICANT CHANGE UP (ref 81–99)
NRBC # BLD: 0 /100 WBCS — SIGNIFICANT CHANGE UP (ref 0–0)
PLATELET # BLD AUTO: 153 K/UL — SIGNIFICANT CHANGE UP (ref 130–400)
POTASSIUM SERPL-MCNC: 4.1 MMOL/L — SIGNIFICANT CHANGE UP (ref 3.5–5)
POTASSIUM SERPL-SCNC: 4.1 MMOL/L — SIGNIFICANT CHANGE UP (ref 3.5–5)
RBC # BLD: 3.41 M/UL — LOW (ref 4.2–5.4)
RBC # FLD: 15.3 % — HIGH (ref 11.5–14.5)
SODIUM SERPL-SCNC: 142 MMOL/L — SIGNIFICANT CHANGE UP (ref 135–146)
WBC # BLD: 11.53 K/UL — HIGH (ref 4.8–10.8)
WBC # FLD AUTO: 11.53 K/UL — HIGH (ref 4.8–10.8)

## 2018-11-20 RX ORDER — LOSARTAN POTASSIUM 100 MG/1
100 TABLET, FILM COATED ORAL DAILY
Qty: 0 | Refills: 0 | Status: DISCONTINUED | OUTPATIENT
Start: 2018-11-20 | End: 2018-11-22

## 2018-11-20 RX ADMIN — Medication 1 APPLICATION(S): at 18:00

## 2018-11-20 RX ADMIN — LOSARTAN POTASSIUM 100 MILLIGRAM(S): 100 TABLET, FILM COATED ORAL at 11:37

## 2018-11-20 RX ADMIN — GABAPENTIN 300 MILLIGRAM(S): 400 CAPSULE ORAL at 21:50

## 2018-11-20 RX ADMIN — HEPARIN SODIUM 5000 UNIT(S): 5000 INJECTION INTRAVENOUS; SUBCUTANEOUS at 14:55

## 2018-11-20 RX ADMIN — GABAPENTIN 300 MILLIGRAM(S): 400 CAPSULE ORAL at 14:55

## 2018-11-20 RX ADMIN — Medication 25 MILLIGRAM(S): at 00:15

## 2018-11-20 RX ADMIN — Medication 0.5 MILLIGRAM(S): at 05:43

## 2018-11-20 RX ADMIN — Medication 25 MILLIGRAM(S): at 11:37

## 2018-11-20 RX ADMIN — Medication 81 MILLIGRAM(S): at 11:34

## 2018-11-20 RX ADMIN — FAMOTIDINE 20 MILLIGRAM(S): 10 INJECTION INTRAVENOUS at 11:34

## 2018-11-20 RX ADMIN — Medication 20 MILLIEQUIVALENT(S): at 11:34

## 2018-11-20 RX ADMIN — Medication 60 MILLIGRAM(S): at 05:42

## 2018-11-20 RX ADMIN — Medication 25 MILLIGRAM(S): at 05:42

## 2018-11-20 RX ADMIN — Medication 25 MILLIGRAM(S): at 17:59

## 2018-11-20 RX ADMIN — Medication 1 APPLICATION(S): at 05:43

## 2018-11-20 RX ADMIN — Medication 3 MILLILITER(S): at 09:48

## 2018-11-20 RX ADMIN — ATORVASTATIN CALCIUM 80 MILLIGRAM(S): 80 TABLET, FILM COATED ORAL at 21:49

## 2018-11-20 RX ADMIN — CITALOPRAM 30 MILLIGRAM(S): 10 TABLET, FILM COATED ORAL at 11:34

## 2018-11-20 RX ADMIN — GABAPENTIN 300 MILLIGRAM(S): 400 CAPSULE ORAL at 05:42

## 2018-11-20 RX ADMIN — HEPARIN SODIUM 5000 UNIT(S): 5000 INJECTION INTRAVENOUS; SUBCUTANEOUS at 05:44

## 2018-11-20 RX ADMIN — Medication 0.5 MILLIGRAM(S): at 17:59

## 2018-11-20 RX ADMIN — HEPARIN SODIUM 5000 UNIT(S): 5000 INJECTION INTRAVENOUS; SUBCUTANEOUS at 21:52

## 2018-11-20 RX ADMIN — Medication 3 MILLILITER(S): at 19:37

## 2018-11-20 RX ADMIN — Medication 40 MILLIGRAM(S): at 11:34

## 2018-11-20 RX ADMIN — Medication 3 MILLILITER(S): at 14:25

## 2018-11-20 NOTE — PROGRESS NOTE ADULT - ASSESSMENT
SHAUN COATES 73y Female  MRN#: [id number]   CODE STATUS: FULL CODE    SUBJECTIVE  73 year old female with known hx of COPD not on home O2, HTN, Depression, LVH, TIA 20 years ago, hyperlipidemia, CAD presented for epigastric pain associated with dizziness. In ED patient was bradycardic/ hypotensive improved with Dopamine and Levophed. Of note the patient was taking both verapamil and metoprolol at home. Hospital course has been complicated by burn here secondary to spilling tea on her groin that her  brought her. Today is hospital day 4d, and this morning she is feeling well and reports no overnight events.     Present Today:           Stephens Catheter (X)No/ ()Yes? Indication:           Central Line (X)No/ ()Yes? Indication:          IV Fluids (X)No/ ()Yes? Type:  Rate:  Indication:    OBJECTIVE  PAST MEDICAL & SURGICAL HISTORY  PVD (peripheral vascular disease)  Other emphysema  Other cardiomyopathy  TIA (transient ischemic attack)  COPD (chronic obstructive pulmonary disease)  Depression  Hypertension  History of cholecystectomy    ALLERGIES:  albuterol (Unknown)  codeine (Other (Moderate))  Depakote (Unknown)    MEDICATIONS:  STANDING MEDICATIONS  ALBUTerol/ipratropium for Nebulization 3 milliLiter(s) Nebulizer every 6 hours  arformoterol for Nebulization 15 MICROGram(s) Nebulizer every 12 hours  aspirin enteric coated 81 milliGRAM(s) Oral daily  atorvastatin 80 milliGRAM(s) Oral at bedtime  buDESOnide   0.25 milliGRAM(s) Respule 0.25 milliGRAM(s) Inhalation two times a day  citalopram 30 milliGRAM(s) Oral daily  famotidine    Tablet 20 milliGRAM(s) Oral daily  gabapentin 300 milliGRAM(s) Oral three times a day  glucagon  Injectable 5 milliGRAM(s) IV Push once  heparin  Injectable 5000 Unit(s) SubCutaneous every 8 hours  LORazepam     Tablet 0.5 milliGRAM(s) Oral two times a day  losartan 100 milliGRAM(s) Oral daily  metoprolol tartrate 25 milliGRAM(s) Oral four times a day  potassium chloride    Tablet ER 20 milliEquivalent(s) Oral daily  predniSONE   Tablet 40 milliGRAM(s) Oral daily  silver sulfADIAZINE 1% Cream 1 Application(s) Topical every 12 hours    PRN MEDICATIONS  ibuprofen  Tablet. 400 milliGRAM(s) Oral every 8 hours PRN  polyethylene glycol 3350 17 Gram(s) Oral two times a day PRN    VITAL SIGNS: Last 24 Hours  T(C): 35.4 (20 Nov 2018 07:05), Max: 37.1 (19 Nov 2018 15:01)  T(F): 95.7 (20 Nov 2018 07:05), Max: 98.7 (19 Nov 2018 15:01)  HR: 82 (20 Nov 2018 07:09) (82 - 95)  BP: 147/68 (20 Nov 2018 07:09) (123/59 - 150/68)  BP(mean): 98 (20 Nov 2018 07:09) (83 - 99)  RR: 18 (20 Nov 2018 07:05) (16 - 20)  SpO2: 95% (20 Nov 2018 07:09) (95% - 100%)    LABS:                        10.4   11.53 )-----------( 153      ( 20 Nov 2018 05:54 )             32.6     11-20    142  |  106  |  37<H>  ----------------------------<  83  4.1   |  23  |  0.9    Ca    8.4<L>      20 Nov 2018 05:54  Mg     2.0     11-19    TPro  5.7<L>  /  Alb  3.5  /  TBili  0.3  /  DBili  <0.2  /  AST  42<H>  /  ALT  87<H>  /  AlkPhos  75  11-19    RADIOLOGY:  No new images to review at this time    PHYSICAL EXAM:  GENERAL: NAD, well-developed, AAOx3  HEENT:  Atraumatic, Normocephalic. EOMI, PERRLA, conjunctiva and sclera clear, No JVD  PULMONARY: Clear to auscultation bilaterally; No wheeze  CARDIOVASCULAR: Regular rate and rhythm; No murmurs, rubs, or gallops  GASTROINTESTINAL: Soft, Nontender, Nondistended; Bowel sounds present, obese  MUSCULOSKELETAL:  2+ Peripheral Pulses, No clubbing, cyanosis, or edema  NEUROLOGY: non-focal  SKIN: Positive for blisters in groin bilaterally, some ruptured, some intact draining serosanguinous fluid    ADMISSION SUMMARY  73 year old female with known hx of COPD not on home O2, HTN, Depression, LVH, TIA 20 years ago, hyperlipidemia, CAD presented for epigastric pain associated with dizziness. In ED patient was bradycardic/ hypotensive. Hospital course has been complicated by burn here secondary to spilling tea on her groin.    ASSESSMENT & PLAN  1. Bradycardia + dizziness + epispastic pain likely medication induced due to metoprolol/verapamil with known hx of severe PVD and COPD  - Pulmonary Following  - Cardio Consult appreciated - restarted on BB with good toleration, continue to hold CCB (likely indefintely)  - Neb q6h, BiPAP at night continue home inhalers  - Taper prednisone to 40mg for 2 days, then 20mgs for 2 days, then stop.  - HR and BP improved significantly    2. Groin burn secondary to spilled tea  - Continue silvadene cream  - Burn consult appreciated over the phone, continue silvadene and local wound care    3. HTN - better controlled now that hypotension resolved  - Restarted on Losartan and metoprolol; continue to hold HCTZ, Verapamil    4. Urinary retention - improved, voiding well without stephens    5. JOYCE on admission, likely secondary to hypotension/pre-renal etiology  - SCr of 2.0 on admission, now improved to 1.2  - Back at baseline, avoid nephrotoxic substances    6. DVT Prophylaxis - subq heparin    Present today:  ( ) Congestive Heart Failure, Yes? ( )Acute / ( )Acute on Chronic / ( )Chronic  :  ( )Systolic / ( )Diastolic               Plan:  ( ) Complicated Pneumonia, Type?  ( )Parapneumonic effusion / ( )Abscess / ( ) Multilobar / ( )Other               Plan:  (X) Morbid Obesity, Yes? BMI: 35               Plan: Counseled patient on diet changes needed, recommend 2000calorie restriction and increased exercise  ( ) Functional Quadriplegia               Plan:  ( ) Encephalopathy               Plan:    (X) Discussion with patient and/or family regarding goals of care  (X) Discussed Case and Plan with Medical Attending    # Planned Disposition: home v. snf SHAUN COATES 73y Female  MRN#: [id number]   CODE STATUS: FULL CODE    SUBJECTIVE  73 year old female with known hx of COPD not on home O2, HTN, Depression, LVH, TIA 20 years ago, hyperlipidemia, CAD presented for epigastric pain associated with dizziness. In ED patient was bradycardic/ hypotensive improved with Dopamine and Levophed. Of note the patient was taking both verapamil and metoprolol at home. Hospital course has been complicated by burn here secondary to spilling tea on her groin that her  brought her. Today is hospital day 4d, and this morning she is feeling well and reports no overnight events.     Present Today:           Stephens Catheter (X)No/ ()Yes? Indication:           Central Line (X)No/ ()Yes? Indication:          IV Fluids (X)No/ ()Yes? Type:  Rate:  Indication:    OBJECTIVE  PAST MEDICAL & SURGICAL HISTORY  PVD (peripheral vascular disease)  Other emphysema  Other cardiomyopathy  TIA (transient ischemic attack)  COPD (chronic obstructive pulmonary disease)  Depression  Hypertension  History of cholecystectomy    ALLERGIES:  albuterol (Unknown)  codeine (Other (Moderate))  Depakote (Unknown)    MEDICATIONS:  STANDING MEDICATIONS  ALBUTerol/ipratropium for Nebulization 3 milliLiter(s) Nebulizer every 6 hours  arformoterol for Nebulization 15 MICROGram(s) Nebulizer every 12 hours  aspirin enteric coated 81 milliGRAM(s) Oral daily  atorvastatin 80 milliGRAM(s) Oral at bedtime  buDESOnide   0.25 milliGRAM(s) Respule 0.25 milliGRAM(s) Inhalation two times a day  citalopram 30 milliGRAM(s) Oral daily  famotidine    Tablet 20 milliGRAM(s) Oral daily  gabapentin 300 milliGRAM(s) Oral three times a day  glucagon  Injectable 5 milliGRAM(s) IV Push once  heparin  Injectable 5000 Unit(s) SubCutaneous every 8 hours  LORazepam     Tablet 0.5 milliGRAM(s) Oral two times a day  losartan 100 milliGRAM(s) Oral daily  metoprolol tartrate 25 milliGRAM(s) Oral four times a day  potassium chloride    Tablet ER 20 milliEquivalent(s) Oral daily  predniSONE   Tablet 40 milliGRAM(s) Oral daily  silver sulfADIAZINE 1% Cream 1 Application(s) Topical every 12 hours    PRN MEDICATIONS  ibuprofen  Tablet. 400 milliGRAM(s) Oral every 8 hours PRN  polyethylene glycol 3350 17 Gram(s) Oral two times a day PRN    VITAL SIGNS: Last 24 Hours  T(C): 35.4 (20 Nov 2018 07:05), Max: 37.1 (19 Nov 2018 15:01)  T(F): 95.7 (20 Nov 2018 07:05), Max: 98.7 (19 Nov 2018 15:01)  HR: 82 (20 Nov 2018 07:09) (82 - 95)  BP: 147/68 (20 Nov 2018 07:09) (123/59 - 150/68)  BP(mean): 98 (20 Nov 2018 07:09) (83 - 99)  RR: 18 (20 Nov 2018 07:05) (16 - 20)  SpO2: 95% (20 Nov 2018 07:09) (95% - 100%)    LABS:                        10.4   11.53 )-----------( 153      ( 20 Nov 2018 05:54 )             32.6     11-20    142  |  106  |  37<H>  ----------------------------<  83  4.1   |  23  |  0.9    Ca    8.4<L>      20 Nov 2018 05:54  Mg     2.0     11-19    TPro  5.7<L>  /  Alb  3.5  /  TBili  0.3  /  DBili  <0.2  /  AST  42<H>  /  ALT  87<H>  /  AlkPhos  75  11-19    RADIOLOGY:  No new images to review at this time    PHYSICAL EXAM:  GENERAL: NAD, well-developed, AAOx3  HEENT:  Atraumatic, Normocephalic. EOMI, PERRLA, conjunctiva and sclera clear, No JVD  PULMONARY: Clear to auscultation bilaterally; No wheeze  CARDIOVASCULAR: Regular rate and rhythm; No murmurs, rubs, or gallops  GASTROINTESTINAL: Soft, Nontender, Nondistended; Bowel sounds present, obese  MUSCULOSKELETAL:  2+ Peripheral Pulses, No clubbing, cyanosis, or edema  NEUROLOGY: non-focal  SKIN: Positive for blisters in groin bilaterally, some ruptured, some intact draining serosanguinous fluid    ADMISSION SUMMARY  73 year old female with known hx of COPD not on home O2, HTN, Depression, LVH, TIA 20 years ago, hyperlipidemia, CAD presented for epigastric pain associated with dizziness. In ED patient was bradycardic/ hypotensive. Hospital course has been complicated by burn here secondary to spilling tea on her groin.    ASSESSMENT & PLAN  1. Bradycardia + dizziness + epispastic pain likely medication induced due to metoprolol/verapamil with known hx of severe PVD and COPD  - Pulmonary Following  - Cardio Consult appreciated - will increase BB to her home dose, will monitor how she will tolerate it, continue to hold CCB (likely indefintely)  - Neb q6h, BiPAP at night continue home inhalers  - Taper prednisone to 40mg for 2 days, then 20mgs for 2 days, then stop.  - HR and BP improved significantly    2. Groin burn secondary to spilled tea  - Continue silvadene cream  - Burn consult appreciated over the phone, continue silvadene and local wound care    3. HTN - better controlled now that hypotension resolved  - Restarted on Losartan and metoprolol; continue to hold HCTZ, Verapamil    4. Urinary retention - improved, voiding well without stephens    5. JOYCE on admission, likely secondary to hypotension/pre-renal etiology  - SCr of 2.0 on admission, now improved to 1.2  - Back at baseline, avoid nephrotoxic substances    6. DVT Prophylaxis - subq heparin    Present today:  ( ) Congestive Heart Failure, Yes? ( )Acute / ( )Acute on Chronic / ( )Chronic  :  ( )Systolic / ( )Diastolic               Plan:  ( ) Complicated Pneumonia, Type?  ( )Parapneumonic effusion / ( )Abscess / ( ) Multilobar / ( )Other               Plan:  (X) Morbid Obesity, Yes? BMI: 35               Plan: Counseled patient on diet changes needed, recommend 2000calorie restriction and increased exercise  ( ) Functional Quadriplegia               Plan:  ( ) Encephalopathy               Plan:    (X) Discussion with patient and/or family regarding goals of care  (X) Discussed Case and Plan with Medical Attending    # Planned Disposition: home with home services, anticipated dc in 24 hrs.

## 2018-11-20 NOTE — PROGRESS NOTE ADULT - SUBJECTIVE AND OBJECTIVE BOX
Patient is a 73y old  Female who presents with a chief complaint of Symptomatic Bradycardia (19 Nov 2018 17:12)      T(F): 97.5 (11-19-18 @ 23:01), Max: 98.7 (11-19-18 @ 15:01)  HR: 95 (11-20-18 @ 05:42)  BP: 150/68 (11-20-18 @ 05:42)  RR: 20 (11-19-18 @ 23:01)  SpO2: 98% (11-20-18 @ 05:42) (96% - 100%)    PHYSICAL EXAM:  GENERAL: NAD, well-groomed, well-developed  HEAD:  Atraumatic, Normocephalic  EYES: EOMI, PERRLA, conjunctiva and sclera clear  ENMT: No tonsillar erythema, exudates, or enlargement; Moist mucous membranes, Good dentition, No lesions  NECK: Supple, No JVD, Normal thyroid  NERVOUS SYSTEM:  Alert & Oriented X3,  Motor Strength 5/5 B/L upper and lower extremities  CHEST/LUNG: Clear to percussion bilaterally; No rales, rhonchi, wheezing, or rubs  HEART: Regular rate and rhythm; No murmurs, rubs, or gallops  ABDOMEN: Soft, Nontender, Nondistended; Bowel sounds present  EXTREMITIES:   No clubbing, cyanosis, or edema  LYMPH: No lymphadenopathy noted  SKIN: No rashes or lesions    labs  11-19    145  |  109  |  34<H>  ----------------------------<  103<H>  4.1   |  24  |  1.2    Ca    9.1      19 Nov 2018 06:07  Mg     2.0     11-19    TPro  5.7<L>  /  Alb  3.5  /  TBili  0.3  /  DBili  <0.2  /  AST  42<H>  /  ALT  87<H>  /  AlkPhos  75  11-19                          10.4   11.53 )-----------( 153      ( 20 Nov 2018 05:54 )             32.6               ALBUTerol/ipratropium for Nebulization 3 milliLiter(s) Nebulizer every 6 hours  arformoterol for Nebulization 15 MICROGram(s) Nebulizer every 12 hours  aspirin enteric coated 81 milliGRAM(s) Oral daily  atorvastatin 80 milliGRAM(s) Oral at bedtime  buDESOnide   0.25 milliGRAM(s) Respule 0.25 milliGRAM(s) Inhalation two times a day  citalopram 30 milliGRAM(s) Oral daily  famotidine    Tablet 20 milliGRAM(s) Oral daily  gabapentin 300 milliGRAM(s) Oral three times a day  glucagon  Injectable 5 milliGRAM(s) IV Push once  heparin  Injectable 5000 Unit(s) SubCutaneous every 8 hours  ibuprofen  Tablet. 400 milliGRAM(s) Oral every 8 hours PRN  LORazepam     Tablet 0.5 milliGRAM(s) Oral two times a day  metoprolol tartrate 25 milliGRAM(s) Oral four times a day  polyethylene glycol 3350 17 Gram(s) Oral two times a day PRN  potassium chloride    Tablet ER 20 milliEquivalent(s) Oral daily  predniSONE   Tablet 60 milliGRAM(s) Oral daily  silver sulfADIAZINE 1% Cream 1 Application(s) Topical every 12 hours

## 2018-11-21 LAB
CK MB CFR SERPL CALC: 2.5 NG/ML — SIGNIFICANT CHANGE UP (ref 0.6–6.3)
CK MB CFR SERPL CALC: 2.6 NG/ML — SIGNIFICANT CHANGE UP (ref 0.6–6.3)
CK SERPL-CCNC: 26 U/L — SIGNIFICANT CHANGE UP (ref 0–225)
CK SERPL-CCNC: 27 U/L — SIGNIFICANT CHANGE UP (ref 0–225)
TROPONIN T SERPL-MCNC: <0.01 NG/ML — SIGNIFICANT CHANGE UP
TROPONIN T SERPL-MCNC: <0.01 NG/ML — SIGNIFICANT CHANGE UP

## 2018-11-21 RX ORDER — NITROGLYCERIN 6.5 MG
0.4 CAPSULE, EXTENDED RELEASE ORAL
Qty: 0 | Refills: 0 | Status: DISCONTINUED | OUTPATIENT
Start: 2018-11-21 | End: 2018-11-22

## 2018-11-21 RX ORDER — METOPROLOL TARTRATE 50 MG
75 TABLET ORAL
Qty: 0 | Refills: 0 | Status: DISCONTINUED | OUTPATIENT
Start: 2018-11-21 | End: 2018-11-22

## 2018-11-21 RX ORDER — BUDESONIDE AND FORMOTEROL FUMARATE DIHYDRATE 160; 4.5 UG/1; UG/1
2 AEROSOL RESPIRATORY (INHALATION)
Qty: 0 | Refills: 0 | Status: DISCONTINUED | OUTPATIENT
Start: 2018-11-21 | End: 2018-11-22

## 2018-11-21 RX ADMIN — FAMOTIDINE 20 MILLIGRAM(S): 10 INJECTION INTRAVENOUS at 11:34

## 2018-11-21 RX ADMIN — Medication 0.5 MILLIGRAM(S): at 21:07

## 2018-11-21 RX ADMIN — Medication 3 MILLILITER(S): at 20:10

## 2018-11-21 RX ADMIN — Medication 75 MILLIGRAM(S): at 06:21

## 2018-11-21 RX ADMIN — HEPARIN SODIUM 5000 UNIT(S): 5000 INJECTION INTRAVENOUS; SUBCUTANEOUS at 21:07

## 2018-11-21 RX ADMIN — Medication 3 MILLILITER(S): at 07:41

## 2018-11-21 RX ADMIN — HEPARIN SODIUM 5000 UNIT(S): 5000 INJECTION INTRAVENOUS; SUBCUTANEOUS at 14:53

## 2018-11-21 RX ADMIN — Medication 25 MILLIGRAM(S): at 00:52

## 2018-11-21 RX ADMIN — Medication 40 MILLIGRAM(S): at 05:16

## 2018-11-21 RX ADMIN — BUDESONIDE AND FORMOTEROL FUMARATE DIHYDRATE 2 PUFF(S): 160; 4.5 AEROSOL RESPIRATORY (INHALATION) at 21:08

## 2018-11-21 RX ADMIN — GABAPENTIN 300 MILLIGRAM(S): 400 CAPSULE ORAL at 05:15

## 2018-11-21 RX ADMIN — Medication 3 MILLILITER(S): at 14:36

## 2018-11-21 RX ADMIN — GABAPENTIN 300 MILLIGRAM(S): 400 CAPSULE ORAL at 21:07

## 2018-11-21 RX ADMIN — Medication 1 APPLICATION(S): at 05:16

## 2018-11-21 RX ADMIN — ARFORMOTEROL TARTRATE 15 MICROGRAM(S): 15 SOLUTION RESPIRATORY (INHALATION) at 09:59

## 2018-11-21 RX ADMIN — CITALOPRAM 30 MILLIGRAM(S): 10 TABLET, FILM COATED ORAL at 11:34

## 2018-11-21 RX ADMIN — GABAPENTIN 300 MILLIGRAM(S): 400 CAPSULE ORAL at 14:53

## 2018-11-21 RX ADMIN — Medication 0.4 MILLIGRAM(S): at 09:58

## 2018-11-21 RX ADMIN — Medication 1 APPLICATION(S): at 17:19

## 2018-11-21 RX ADMIN — LOSARTAN POTASSIUM 100 MILLIGRAM(S): 100 TABLET, FILM COATED ORAL at 05:18

## 2018-11-21 RX ADMIN — Medication 0.5 MILLIGRAM(S): at 05:18

## 2018-11-21 RX ADMIN — Medication 81 MILLIGRAM(S): at 11:34

## 2018-11-21 RX ADMIN — ATORVASTATIN CALCIUM 80 MILLIGRAM(S): 80 TABLET, FILM COATED ORAL at 21:07

## 2018-11-21 RX ADMIN — HEPARIN SODIUM 5000 UNIT(S): 5000 INJECTION INTRAVENOUS; SUBCUTANEOUS at 05:15

## 2018-11-21 RX ADMIN — Medication 20 MILLIEQUIVALENT(S): at 11:34

## 2018-11-21 RX ADMIN — Medication 75 MILLIGRAM(S): at 17:19

## 2018-11-21 NOTE — PROGRESS NOTE ADULT - ASSESSMENT
Patient SOB.   On antibiotics . Patient BP higher. Will increase beta. She is requesting pulmonary.  No verapramil.

## 2018-11-21 NOTE — PROGRESS NOTE ADULT - ASSESSMENT
SHAUN COATES 73y Female  MRN#: [id number]   CODE STATUS: FULL CODE    SUBJECTIVE  73 year old female with known hx of COPD not on home O2, HTN, Depression, LVH, TIA 20 years ago, hyperlipidemia, CAD presented for epigastric pain associated with dizziness. In ED patient was bradycardic/ hypotensive improved with Dopamine and Levophed. Of note the patient was taking both verapamil and metoprolol at home. Hospital course has been complicated by burn secondary to spilling tea on her groin that her  brought her and epigastric pain. Today is hospital day 5d, and this morning she is feeling well and reports no overnight events.     Present Today:           Stephens Catheter (X)No/ ()Yes? Indication:           Central Line (X)No/ ()Yes? Indication:          IV Fluids (X)No/ ()Yes? Type:  Rate:  Indication:    OBJECTIVE  PAST MEDICAL & SURGICAL HISTORY  PVD (peripheral vascular disease)  Other emphysema  Other cardiomyopathy  TIA (transient ischemic attack)  COPD (chronic obstructive pulmonary disease)  Depression  Hypertension  History of cholecystectomy    ALLERGIES:  albuterol (Unknown)  codeine (Other (Moderate))  Depakote (Unknown)    MEDICATIONS:  STANDING MEDICATIONS  ALBUTerol/ipratropium for Nebulization 3 milliLiter(s) Nebulizer every 6 hours  aspirin enteric coated 81 milliGRAM(s) Oral daily  atorvastatin 80 milliGRAM(s) Oral at bedtime  buDESOnide 160 MICROgram(s)/formoterol 4.5 MICROgram(s) Inhaler 2 Puff(s) Inhalation two times a day  citalopram 30 milliGRAM(s) Oral daily  famotidine    Tablet 20 milliGRAM(s) Oral daily  gabapentin 300 milliGRAM(s) Oral three times a day  heparin  Injectable 5000 Unit(s) SubCutaneous every 8 hours  LORazepam     Tablet 0.5 milliGRAM(s) Oral two times a day  losartan 100 milliGRAM(s) Oral daily  metoprolol tartrate 75 milliGRAM(s) Oral two times a day  potassium chloride    Tablet ER 20 milliEquivalent(s) Oral daily  silver sulfADIAZINE 1% Cream 1 Application(s) Topical every 12 hours    PRN MEDICATIONS  nitroglycerin     SubLingual 0.4 milliGRAM(s) SubLingual every 5 minutes PRN  polyethylene glycol 3350 17 Gram(s) Oral two times a day PRN    VITAL SIGNS: Last 24 Hours  T(C): 36.1 (21 Nov 2018 07:01), Max: 36.6 (20 Nov 2018 15:01)  T(F): 97 (21 Nov 2018 07:01), Max: 97.9 (20 Nov 2018 15:01)  HR: 81 (21 Nov 2018 09:55) (75 - 91)  BP: 112/57 (21 Nov 2018 09:55) (112/57 - 165/79)  BP(mean): 81 (21 Nov 2018 09:55) (81 - 113)  RR: 18 (21 Nov 2018 07:01) (18 - 18)  SpO2: 96% (21 Nov 2018 00:51) (96% - 98%)    LABS:                        10.4   11.53 )-----------( 153      ( 20 Nov 2018 05:54 )             32.6     11-20    142  |  106  |  37<H>  ----------------------------<  83  4.1   |  23  |  0.9    Ca    8.4<L>      20 Nov 2018 05:54    RADIOLOGY:  No new images to review at this time    PHYSICAL EXAM:  GENERAL: NAD, well-developed, AAOx3  HEENT:  Atraumatic, Normocephalic. EOMI, PERRLA, conjunctiva and sclera clear, No JVD  PULMONARY: Clear to auscultation bilaterally; No wheeze  CARDIOVASCULAR: Regular rate and rhythm; No murmurs, rubs, or gallops  GASTROINTESTINAL: Soft, Nontender, Nondistended; Bowel sounds present, obese  MUSCULOSKELETAL:  2+ Peripheral Pulses, No clubbing, cyanosis, or edema  NEUROLOGY: non-focal  SKIN: Positive for blisters in groin bilaterally, some ruptured, some intact draining serosanguinous fluid    ADMISSION SUMMARY  73 year old female with known hx of COPD not on home O2, HTN, Depression, LVH, TIA 20 years ago, hyperlipidemia, CAD presented for epigastric pain associated with dizziness. In ED patient was bradycardic/ hypotensive. Hospital course has been complicated by burn here secondary to spilling tea on her groin and epigastric pain.    ASSESSMENT & PLAN  1. Bradycardia + dizziness + epispastic pain likely medication induced due to metoprolol/verapamil with known hx of severe PVD and COPD  - Pulmonary and Cardiology Following  - Cardio Consult appreciated - will increase BB to 75mg q12h, will monitor how she will tolerate it, continue to hold CCB (likely indefinitely)  - Duoneb q6h, BiPAP at night   - Start Symbicort hold home nebs  - Taper prednisone to 20mg today, then stop.  - HR and BP improved significantly    2. Groin burn secondary to spilled tea  - Continue silvadene cream  - Burn consult appreciated over the phone, continue silvadene and local wound care    3. Abdominal pain, epigastric - rule out atypical chest pain  - EKG WNL  - Check Cardiac enzymes twice today to rule out ACS  - Give Nitro once to eval for improvement    4. HTN - better controlled now that hypotension resolved  - Restarted on Losartan and metoprolol; continue to hold HCTZ, Verapamil    5. Urinary retention - improved, voiding well without stephens    6. JOYCE on admission, likely secondary to hypotension/pre-renal etiology  - SCr of 2.0 on admission, now improved to 1.2  - Back at baseline, avoid nephrotoxic substances    7. DVT Prophylaxis - subq heparin    Present today:  ( ) Congestive Heart Failure, Yes? ( )Acute / ( )Acute on Chronic / ( )Chronic  :  ( )Systolic / ( )Diastolic               Plan:  ( ) Complicated Pneumonia, Type?  ( )Parapneumonic effusion / ( )Abscess / ( ) Multilobar / ( )Other               Plan:  (X) Morbid Obesity, Yes? BMI: 35               Plan: Counseled patient on diet changes needed, recommend 2000calorie restriction and increased exercise  ( ) Functional Quadriplegia               Plan:  ( ) Encephalopathy               Plan:    (X) Discussion with patient and/or family regarding goals of care  (X) Discussed Case and Plan with Medical Attending    # Planned Disposition: home with home services likely

## 2018-11-21 NOTE — PROGRESS NOTE ADULT - SUBJECTIVE AND OBJECTIVE BOX
Patient is a 73y old  Female who presents with a chief complaint of Symptomatic Bradycardia (20 Nov 2018 10:20)      T(F): 97 (11-20-18 @ 23:00), Max: 97.9 (11-20-18 @ 15:01)  HR: 80 (11-21-18 @ 05:11)  BP: 150/73 (11-21-18 @ 05:11)  RR: 18 (11-21-18 @ 05:11)  SpO2: 96% (11-21-18 @ 00:51) (95% - 98%)    PHYSICAL EXAM:  GENERAL: NAD, well-groomed, well-developed  HEAD:  Atraumatic, Normocephalic  EYES: EOMI, PERRLA, conjunctiva and sclera clear  ENMT: No tonsillar erythema, exudates, or enlargement; Moist mucous membranes, Good dentition, No lesions  NECK: Supple, No JVD, Normal thyroid  NERVOUS SYSTEM:  Alert & Oriented X3,  Motor Strength 5/5 B/L upper and lower extremities  CHEST/LUNG: Clear to percussion bilaterally; No rales, rhonchi, wheezing, or rubs  HEART: Regular rate and rhythm; No murmurs, rubs, or gallops  ABDOMEN: Soft, Nontender, Nondistended; Bowel sounds present  EXTREMITIES:   No clubbing, cyanosis, or edema  LYMPH: No lymphadenopathy noted  SKIN: No rashes or lesions    labs  11-20    142  |  106  |  37<H>  ----------------------------<  83  4.1   |  23  |  0.9    Ca    8.4<L>      20 Nov 2018 05:54  Mg     2.0     11-19    TPro  5.7<L>  /  Alb  3.5  /  TBili  0.3  /  DBili  <0.2  /  AST  42<H>  /  ALT  87<H>  /  AlkPhos  75  11-19                          10.4   11.53 )-----------( 153      ( 20 Nov 2018 05:54 )             32.6               ALBUTerol/ipratropium for Nebulization 3 milliLiter(s) Nebulizer every 6 hours  arformoterol for Nebulization 15 MICROGram(s) Nebulizer every 12 hours  aspirin enteric coated 81 milliGRAM(s) Oral daily  atorvastatin 80 milliGRAM(s) Oral at bedtime  buDESOnide   0.25 milliGRAM(s) Respule 0.25 milliGRAM(s) Inhalation two times a day  citalopram 30 milliGRAM(s) Oral daily  famotidine    Tablet 20 milliGRAM(s) Oral daily  gabapentin 300 milliGRAM(s) Oral three times a day  heparin  Injectable 5000 Unit(s) SubCutaneous every 8 hours  ibuprofen  Tablet. 400 milliGRAM(s) Oral every 8 hours PRN  LORazepam     Tablet 0.5 milliGRAM(s) Oral two times a day  losartan 100 milliGRAM(s) Oral daily  polyethylene glycol 3350 17 Gram(s) Oral two times a day PRN  potassium chloride    Tablet ER 20 milliEquivalent(s) Oral daily  predniSONE   Tablet 40 milliGRAM(s) Oral daily  silver sulfADIAZINE 1% Cream 1 Application(s) Topical every 12 hours

## 2018-11-21 NOTE — PROGRESS NOTE ADULT - ATTENDING COMMENTS
Patient seen and examined independently. Agree with resident note with exceptions. Case discussed with housestaff, nursing and patient    Iatrogenic bradycardia - Resolved once taken off CCB. Stable HRs on BB    D/C planning for AM
Agree with resident's note, HPI, PE, assessment and plan.  Pt was seen and examined independently.

## 2018-11-21 NOTE — CONSULT NOTE ADULT - SUBJECTIVE AND OBJECTIVE BOX
Patient is a 73y old  Female who presents with a chief complaint of Symptomatic Bradycardia (21 Nov 2018 10:41)      HPI:  73 year old female with known hx of COPD not on home O2, HTN, Depression, LVH, TIA 20 years ago, hyperlipidemia, CAD presented for epigastric pain associated with dizziness. In ED patient was bradycardic/ hypotensive. Treated with atropine with no response, then placed on Dopamine and Levophed with good response. As per family similar episode few weeks ago was bradycardic/ hypotensive found to be in CHF. Of note the patient takes verapamil and metoprolol at home. (17 Nov 2018 11:02)  a consult was placed [place]     PAST MEDICAL & SURGICAL HISTORY:  PVD (peripheral vascular disease)  Other emphysema  Other cardiomyopathy  TIA (transient ischemic attack)  COPD (chronic obstructive pulmonary disease)  Depression  Hypertension  History of cholecystectomy    Allergies    albuterol (Unknown)  codeine (Other (Moderate))  Depakote (Unknown)    Intolerances      Family history : no cardiovscular family history   Home Medications:  aspirin 81 mg oral tablet: 1 tab(s) orally once a day (17 Nov 2018 11:08)  atorvastatin 80 mg oral tablet: 1 tab(s) orally once a day (17 Nov 2018 11:08)  Brovana 15 mcg/2 mL inhalation solution: 2 milliliter(s) inhaled 2 times a day (17 Nov 2018 11:08)  ciclopirox 0.77% topical cream: Apply topically to affected area 2 times a day (17 Nov 2018 11:08)  citalopram: 30 milligram(s) orally once a day (17 Nov 2018 11:08)  Coricidin:  (17 Nov 2018 11:08)  Fish Oil 1200 mg oral capsule: twice a day (17 Nov 2018 11:08)  losartan-hydroCHLOROthiazide 100 mg-25 mg oral tablet: 1 tab(s) orally once a day (17 Nov 2018 11:08)  metoprolol tartrate 100 mg oral tablet: 1 tab(s) orally once a day (17 Nov 2018 11:08)  Metoprolol Tartrate 100 mg oral tablet: 1 tab(s) orally 2 times a day (17 Nov 2018 11:08)  potassium chloride 20 mEq oral tablet, extended release: 1 tab(s) orally 2 times a day (17 Nov 2018 11:08)  raNITIdine 150 mg oral capsule: 1 cap(s) orally 2 times a day (17 Nov 2018 11:08)  Spiriva 18 mcg inhalation capsule: 1 cap(s) inhaled once a day (17 Nov 2018 11:08)  verapamil 40 mg oral tablet: 1 tab(s) orally 3 times a day (17 Nov 2018 11:08)  Xopenex HFA 45 mcg/inh inhalation aerosol: 2 puff(s) inhaled every 4 hours (17 Nov 2018 11:08)    Occupation:  Alochol: Denied  Smoking:ex  Drug Use: Denied  Marital Status:         ROS: as in HPI; All other systems reviewed are negative    ICU Vital Signs Last 24 Hrs  T(C): 36.1 (21 Nov 2018 07:01), Max: 36.1 (20 Nov 2018 23:00)  T(F): 97 (21 Nov 2018 07:01), Max: 97 (20 Nov 2018 23:00)  HR: 81 (21 Nov 2018 09:55) (75 - 85)  BP: 112/57 (21 Nov 2018 09:55) (112/57 - 165/79)  BP(mean): 81 (21 Nov 2018 09:55) (81 - 113)  ABP: --  ABP(mean): --  RR: 18 (21 Nov 2018 07:01) (18 - 18)  SpO2: 96% (21 Nov 2018 00:51) (96% - 98%)        Physical Examination:    General: No acute distress.  Alert, oriented, interactive, nonfocal    HEENT: Pupils equal, reactive to light.  Symmetric.    PULM: Clear to auscultation bilaterally, no significant sputum production    CVS: Regular rate and rhythm, no murmurs, rubs, or gallops    ABD: Soft, nondistended, nontender, normoactive bowel sounds, no masses    EXT: 1 edema, nontender, no clubbing     SKIN: Warm and well perfused, no rashes noted.    Neurology : no motor or sensory deficit     Musculoskeletal : move all extremety     Lymphatic system: no Palpable node               I&O's Detail    20 Nov 2018 07:01  -  21 Nov 2018 07:00  --------------------------------------------------------  IN:  Total IN: 0 mL    OUT:    Voided: 2100 mL  Total OUT: 2100 mL    Total NET: -2100 mL      21 Nov 2018 07:01  -  21 Nov 2018 16:15  --------------------------------------------------------  IN:  Total IN: 0 mL    OUT:    Voided: 600 mL  Total OUT: 600 mL    Total NET: -600 mL            LABS:                        10.4   11.53 )-----------( 153      ( 20 Nov 2018 05:54 )             32.6     20 Nov 2018 05:54    142    |  106    |  37     ----------------------------<  83     4.1     |  23     |  0.9      Ca    8.4        20 Nov 2018 05:54        CARDIAC MARKERS ( 21 Nov 2018 14:41 )  x     / <0.01 ng/mL / 27 U/L / x     / 2.6 ng/mL  CARDIAC MARKERS ( 21 Nov 2018 11:04 )  x     / <0.01 ng/mL / 26 U/L / x     / 2.5 ng/mL      CAPILLARY BLOOD GLUCOSE            Culture        MEDICATIONS  (STANDING):  ALBUTerol/ipratropium for Nebulization 3 milliLiter(s) Nebulizer every 6 hours  aspirin enteric coated 81 milliGRAM(s) Oral daily  atorvastatin 80 milliGRAM(s) Oral at bedtime  buDESOnide 160 MICROgram(s)/formoterol 4.5 MICROgram(s) Inhaler 2 Puff(s) Inhalation two times a day  citalopram 30 milliGRAM(s) Oral daily  famotidine    Tablet 20 milliGRAM(s) Oral daily  gabapentin 300 milliGRAM(s) Oral three times a day  heparin  Injectable 5000 Unit(s) SubCutaneous every 8 hours  LORazepam     Tablet 0.5 milliGRAM(s) Oral two times a day  losartan 100 milliGRAM(s) Oral daily  metoprolol tartrate 75 milliGRAM(s) Oral two times a day  potassium chloride    Tablet ER 20 milliEquivalent(s) Oral daily  silver sulfADIAZINE 1% Cream 1 Application(s) Topical every 12 hours    MEDICATIONS  (PRN):  nitroglycerin     SubLingual 0.4 milliGRAM(s) SubLingual every 5 minutes PRN Chest Pain  polyethylene glycol 3350 17 Gram(s) Oral two times a day PRN Constipation        RADIOLOGY: ***   < from: CT Angio Chest Dissection Protocol (11.16.18 @ 23:31) >   Redemonstrated is severe centrilobular and paraseptal emphysema   with large emphysematous bullae. Again noted is a left apical   bronchiectasis and pleural thickening with interposed surgical sutures   versus calcification. Correlate with surgical history. No evidence for   new focal consolidation, pleural effusion or pneumothorax.    < end of copied text >    CXR:  TLC:  OG:  ET tube:          ECHO:        CRITICAL CARE TIME SPENT: ***

## 2018-11-21 NOTE — CONSULT NOTE ADULT - ASSESSMENT
IMPRESSION:  COPD   ?? FLAVIO   emphysema       PLAN:    CNS: no sedation     HEENT: oral care     PULMONARY: keep Pox . 92%   start symbicort Q 12 hrs   proair as needed   need outpatient PFT   sleep study   will get medical record from her pulmonologist     CARDIOVASCULAR: follow cardiology     GI: GI prophylaxis.  Feeding     RENAL: follow lytes     INFECTIOUS DISEASE: no abx     HEMATOLOGICAL:  DVT prophylaxis.    ENDOCRINE:  Follow up FS.  Insulin protocol if needed.    follow as outpatient for further work up

## 2018-11-22 ENCOUNTER — TRANSCRIPTION ENCOUNTER (OUTPATIENT)
Age: 73
End: 2018-11-22

## 2018-11-22 VITALS — SYSTOLIC BLOOD PRESSURE: 115 MMHG | DIASTOLIC BLOOD PRESSURE: 60 MMHG | HEART RATE: 72 BPM | OXYGEN SATURATION: 91 %

## 2018-11-22 LAB
ALBUMIN SERPL ELPH-MCNC: 3.2 G/DL — LOW (ref 3.5–5.2)
ALP SERPL-CCNC: 58 U/L — SIGNIFICANT CHANGE UP (ref 30–115)
ALT FLD-CCNC: 43 U/L — HIGH (ref 0–41)
ANION GAP SERPL CALC-SCNC: 11 MMOL/L — SIGNIFICANT CHANGE UP (ref 7–14)
AST SERPL-CCNC: 22 U/L — SIGNIFICANT CHANGE UP (ref 0–41)
BILIRUB DIRECT SERPL-MCNC: <0.2 MG/DL — SIGNIFICANT CHANGE UP (ref 0–0.2)
BILIRUB INDIRECT FLD-MCNC: >0.3 MG/DL — SIGNIFICANT CHANGE UP (ref 0.2–1.2)
BILIRUB SERPL-MCNC: 0.5 MG/DL — SIGNIFICANT CHANGE UP (ref 0.2–1.2)
BUN SERPL-MCNC: 33 MG/DL — HIGH (ref 10–20)
CALCIUM SERPL-MCNC: 8.6 MG/DL — SIGNIFICANT CHANGE UP (ref 8.5–10.1)
CHLORIDE SERPL-SCNC: 105 MMOL/L — SIGNIFICANT CHANGE UP (ref 98–110)
CO2 SERPL-SCNC: 23 MMOL/L — SIGNIFICANT CHANGE UP (ref 17–32)
CREAT SERPL-MCNC: 0.9 MG/DL — SIGNIFICANT CHANGE UP (ref 0.7–1.5)
CULTURE RESULTS: SIGNIFICANT CHANGE UP
GLUCOSE SERPL-MCNC: 78 MG/DL — SIGNIFICANT CHANGE UP (ref 70–99)
HCT VFR BLD CALC: 34 % — LOW (ref 37–47)
HGB BLD-MCNC: 11.1 G/DL — LOW (ref 12–16)
MCHC RBC-ENTMCNC: 31.3 PG — HIGH (ref 27–31)
MCHC RBC-ENTMCNC: 32.6 G/DL — SIGNIFICANT CHANGE UP (ref 32–37)
MCV RBC AUTO: 95.8 FL — SIGNIFICANT CHANGE UP (ref 81–99)
NRBC # BLD: 0 /100 WBCS — SIGNIFICANT CHANGE UP (ref 0–0)
PLATELET # BLD AUTO: 165 K/UL — SIGNIFICANT CHANGE UP (ref 130–400)
POTASSIUM SERPL-MCNC: 4.2 MMOL/L — SIGNIFICANT CHANGE UP (ref 3.5–5)
POTASSIUM SERPL-SCNC: 4.2 MMOL/L — SIGNIFICANT CHANGE UP (ref 3.5–5)
PROT SERPL-MCNC: 5.2 G/DL — LOW (ref 6–8)
RBC # BLD: 3.55 M/UL — LOW (ref 4.2–5.4)
RBC # FLD: 15.2 % — HIGH (ref 11.5–14.5)
SODIUM SERPL-SCNC: 139 MMOL/L — SIGNIFICANT CHANGE UP (ref 135–146)
SPECIMEN SOURCE: SIGNIFICANT CHANGE UP
WBC # BLD: 10.37 K/UL — SIGNIFICANT CHANGE UP (ref 4.8–10.8)
WBC # FLD AUTO: 10.37 K/UL — SIGNIFICANT CHANGE UP (ref 4.8–10.8)

## 2018-11-22 RX ORDER — ACETAMINOPHEN AND CHLORPHENIRAMINE MALEATE 325; 2 MG/1; MG/1
0 TABLET ORAL
Qty: 0 | Refills: 0 | COMMUNITY

## 2018-11-22 RX ORDER — LEVALBUTEROL 1.25 MG/.5ML
2 SOLUTION, CONCENTRATE RESPIRATORY (INHALATION)
Qty: 0 | Refills: 0 | COMMUNITY

## 2018-11-22 RX ORDER — METOPROLOL TARTRATE 50 MG
1 TABLET ORAL
Qty: 0 | Refills: 0 | COMMUNITY
Start: 2018-11-22

## 2018-11-22 RX ORDER — GABAPENTIN 400 MG/1
100 CAPSULE ORAL
Qty: 0 | Refills: 0 | DISCHARGE
Start: 2018-11-22

## 2018-11-22 RX ORDER — ARFORMOTEROL TARTRATE 15 UG/2ML
2 SOLUTION RESPIRATORY (INHALATION)
Qty: 0 | Refills: 0 | COMMUNITY

## 2018-11-22 RX ORDER — TIOTROPIUM BROMIDE 18 UG/1
1 CAPSULE ORAL; RESPIRATORY (INHALATION)
Qty: 0 | Refills: 0 | COMMUNITY

## 2018-11-22 RX ORDER — TIOTROPIUM BROMIDE 18 UG/1
1 CAPSULE ORAL; RESPIRATORY (INHALATION)
Qty: 1 | Refills: 0 | OUTPATIENT
Start: 2018-11-22

## 2018-11-22 RX ORDER — POTASSIUM CHLORIDE 20 MEQ
1 PACKET (EA) ORAL
Qty: 0 | Refills: 0 | COMMUNITY

## 2018-11-22 RX ORDER — METOPROLOL TARTRATE 50 MG
1 TABLET ORAL
Qty: 0 | Refills: 0 | COMMUNITY

## 2018-11-22 RX ORDER — METOPROLOL TARTRATE 50 MG
1 TABLET ORAL
Qty: 60 | Refills: 0 | OUTPATIENT
Start: 2018-11-22 | End: 2018-12-21

## 2018-11-22 RX ORDER — ALBUTEROL 90 UG/1
1 AEROSOL, METERED ORAL
Qty: 1 | Refills: 0 | OUTPATIENT
Start: 2018-11-22 | End: 2018-12-21

## 2018-11-22 RX ORDER — FUROSEMIDE 40 MG
40 TABLET ORAL ONCE
Qty: 0 | Refills: 0 | Status: COMPLETED | OUTPATIENT
Start: 2018-11-22 | End: 2018-11-22

## 2018-11-22 RX ORDER — BUDESONIDE AND FORMOTEROL FUMARATE DIHYDRATE 160; 4.5 UG/1; UG/1
2 AEROSOL RESPIRATORY (INHALATION)
Qty: 1 | Refills: 0 | OUTPATIENT
Start: 2018-11-22 | End: 2018-12-21

## 2018-11-22 RX ORDER — VERAPAMIL HCL 240 MG
1 CAPSULE, EXTENDED RELEASE PELLETS 24 HR ORAL
Qty: 0 | Refills: 0 | COMMUNITY

## 2018-11-22 RX ORDER — GABAPENTIN 400 MG/1
1 CAPSULE ORAL
Qty: 0 | Refills: 0 | DISCHARGE
Start: 2018-11-22

## 2018-11-22 RX ADMIN — Medication 1 APPLICATION(S): at 05:33

## 2018-11-22 RX ADMIN — FAMOTIDINE 20 MILLIGRAM(S): 10 INJECTION INTRAVENOUS at 11:18

## 2018-11-22 RX ADMIN — BUDESONIDE AND FORMOTEROL FUMARATE DIHYDRATE 2 PUFF(S): 160; 4.5 AEROSOL RESPIRATORY (INHALATION) at 09:24

## 2018-11-22 RX ADMIN — Medication 20 MILLIGRAM(S): at 05:33

## 2018-11-22 RX ADMIN — Medication 81 MILLIGRAM(S): at 11:18

## 2018-11-22 RX ADMIN — Medication 0.5 MILLIGRAM(S): at 05:33

## 2018-11-22 RX ADMIN — Medication 3 MILLILITER(S): at 07:57

## 2018-11-22 RX ADMIN — HEPARIN SODIUM 5000 UNIT(S): 5000 INJECTION INTRAVENOUS; SUBCUTANEOUS at 05:33

## 2018-11-22 RX ADMIN — Medication 3 MILLILITER(S): at 13:52

## 2018-11-22 RX ADMIN — LOSARTAN POTASSIUM 100 MILLIGRAM(S): 100 TABLET, FILM COATED ORAL at 05:33

## 2018-11-22 RX ADMIN — Medication 20 MILLIEQUIVALENT(S): at 11:18

## 2018-11-22 RX ADMIN — Medication 3 MILLILITER(S): at 03:18

## 2018-11-22 RX ADMIN — Medication 75 MILLIGRAM(S): at 05:33

## 2018-11-22 RX ADMIN — GABAPENTIN 300 MILLIGRAM(S): 400 CAPSULE ORAL at 05:34

## 2018-11-22 RX ADMIN — Medication 40 MILLIGRAM(S): at 10:24

## 2018-11-22 RX ADMIN — CITALOPRAM 30 MILLIGRAM(S): 10 TABLET, FILM COATED ORAL at 11:18

## 2018-11-22 NOTE — DISCHARGE NOTE ADULT - PATIENT PORTAL LINK FT
You can access the [facility] Patient Portal, offered by [facility], by registering with the following website: [email or web address]

## 2018-11-22 NOTE — DISCHARGE NOTE ADULT - MEDICATION SUMMARY - MEDICATIONS TO CHANGE
I will SWITCH the dose or number of times a day I take the medications listed below when I get home from the hospital:    Metoprolol Tartrate 100 mg oral tablet  -- 1 tab(s) by mouth 2 times a day    metoprolol tartrate 100 mg oral tablet  -- 1 tab(s) by mouth once a day

## 2018-11-22 NOTE — DISCHARGE NOTE ADULT - MEDICATION SUMMARY - MEDICATIONS TO STOP TAKING
I will STOP taking the medications listed below when I get home from the hospital:    verapamil 40 mg oral tablet  -- 1 tab(s) by mouth 3 times a day    Coricidin    potassium chloride 20 mEq oral tablet, extended release  -- 1 tab(s) by mouth 2 times a day I will STOP taking the medications listed below when I get home from the hospital:    verapamil 40 mg oral tablet  -- 1 tab(s) by mouth 3 times a day    Xopenex HFA 45 mcg/inh inhalation aerosol  -- 2 puff(s) inhaled every 4 hours    Brovana 15 mcg/2 mL inhalation solution  -- 2 milliliter(s) inhaled 2 times a day    Coricidin    potassium chloride 20 mEq oral tablet, extended release  -- 1 tab(s) by mouth 2 times a day
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
## 2018-11-22 NOTE — PROGRESS NOTE ADULT - REASON FOR ADMISSION
Symptomatic Bradycardia
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
## 2018-11-22 NOTE — DISCHARGE NOTE ADULT - MEDICATION SUMMARY - MEDICATIONS TO TAKE
I will START or STAY ON the medications listed below when I get home from the hospital:    aspirin 81 mg oral tablet  -- 1 tab(s) by mouth once a day  -- Indication: For CAD    gabapentin 300 mg oral capsule  -- 1 cap(s) by mouth 3 times a day  -- Indication: For Neuropathy    citalopram  -- 30 milligram(s) by mouth once a day  -- Indication: For Mood    atorvastatin 80 mg oral tablet  -- 1 tab(s) by mouth once a day  -- Indication: For HLD    losartan-hydroCHLOROthiazide 100 mg-25 mg oral tablet  -- 1 tab(s) by mouth once a day  -- Indication: For HTN    metoprolol tartrate 75 mg oral tablet  -- 1 tab(s) by mouth 2 times a day  -- Indication: For HTN    Spiriva 18 mcg inhalation capsule  -- 1 cap(s) inhaled once a day  -- Indication: For COPD    Brovana 15 mcg/2 mL inhalation solution  -- 2 milliliter(s) inhaled 2 times a day  -- Indication: For COPD    Xopenex HFA 45 mcg/inh inhalation aerosol  -- 2 puff(s) inhaled every 4 hours  -- Indication: For COPD    ciclopirox 0.77% topical cream  -- Apply on skin to affected area 2 times a day  -- Indication: For Rash    silver sulfADIAZINE 1% topical cream  -- 1 application on skin every 12 hours  -- Indication: For Burn    raNITIdine 150 mg oral capsule  -- 1 cap(s) by mouth 2 times a day  -- Indication: For GERD    Fish Oil 1200 mg oral capsule  -- twice a day  -- Indication: For HTN I will START or STAY ON the medications listed below when I get home from the hospital:    aspirin 81 mg oral tablet  -- 1 tab(s) by mouth once a day  -- Indication: For CAD    gabapentin 300 mg oral capsule  -- 1 cap(s) by mouth 3 times a day  -- Indication: For Neuropathy    citalopram  -- 30 milligram(s) by mouth once a day  -- Indication: For Mood    atorvastatin 80 mg oral tablet  -- 1 tab(s) by mouth once a day  -- Indication: For HLD    losartan-hydroCHLOROthiazide 100 mg-25 mg oral tablet  -- 1 tab(s) by mouth once a day  -- Indication: For HTN    metoprolol tartrate 75 mg oral tablet  -- 1 tab(s) by mouth 2 times a day  -- Indication: For HTN    budesonide-formoterol 160 mcg-4.5 mcg/inh inhalation aerosol  -- 2 application inhaled 2 times a day x 30 days   -- Indication: For COPD    Spiriva 18 mcg inhalation capsule  -- 1 cap(s) inhaled once a day  -- Indication: For COPD    ProAir HFA 90 mcg/inh inhalation aerosol  -- 1 puff(s) inhaled every 6 hours   -- For inhalation only.  It is very important that you take or use this exactly as directed.  Do not skip doses or discontinue unless directed by your doctor.  Obtain medical advice before taking any non-prescription drugs as some may affect the action of this medication.  Shake well before use.    -- Indication: For COPD    ciclopirox 0.77% topical cream  -- Apply on skin to affected area 2 times a day  -- Indication: For Rash    silver sulfADIAZINE 1% topical cream  -- 1 application on skin every 12 hours  -- Indication: For Burn    raNITIdine 150 mg oral capsule  -- 1 cap(s) by mouth 2 times a day  -- Indication: For GERD    Fish Oil 1200 mg oral capsule  -- twice a day  -- Indication: For HTN I will START or STAY ON the medications listed below when I get home from the hospital:    aspirin 81 mg oral tablet  -- 1 tab(s) by mouth once a day  -- Indication: For CAD    gabapentin 300 mg oral capsule  -- 1 cap(s) by mouth 3 times a day  -- Indication: For Neuropathy    LORazepam 0.5 mg oral tablet  -- 1 tab(s) by mouth 2 times a day  -- Indication: For Anxiety    citalopram  -- 30 milligram(s) by mouth once a day  -- Indication: For Mood    atorvastatin 80 mg oral tablet  -- 1 tab(s) by mouth once a day  -- Indication: For HLD    losartan-hydroCHLOROthiazide 100 mg-25 mg oral tablet  -- 1 tab(s) by mouth once a day  -- Indication: For HTN    metoprolol tartrate 75 mg oral tablet  -- 1 tab(s) by mouth 2 times a day  -- Indication: For HTN    budesonide-formoterol 160 mcg-4.5 mcg/inh inhalation aerosol  -- 2 application inhaled 2 times a day x 30 days   -- Indication: For COPD    Spiriva 18 mcg inhalation capsule  -- 1 cap(s) inhaled once a day  -- Indication: For COPD    ProAir HFA 90 mcg/inh inhalation aerosol  -- 1 puff(s) inhaled every 6 hours   -- For inhalation only.  It is very important that you take or use this exactly as directed.  Do not skip doses or discontinue unless directed by your doctor.  Obtain medical advice before taking any non-prescription drugs as some may affect the action of this medication.  Shake well before use.    -- Indication: For COPD    ciclopirox 0.77% topical cream  -- Apply on skin to affected area 2 times a day  -- Indication: For Rash    silver sulfADIAZINE 1% topical cream  -- 1 application on skin every 12 hours  -- Indication: For Burn    raNITIdine 150 mg oral capsule  -- 1 cap(s) by mouth 2 times a day  -- Indication: For GERD    Fish Oil 1200 mg oral capsule  -- twice a day  -- Indication: For HTN

## 2018-11-22 NOTE — PROGRESS NOTE ADULT - ASSESSMENT
In chair on o2. BP HR acceptable. Ambulate on RA and check sat. To consider pulmonary rehab. Will need outpatient PFT's. Will consider out patient dobutamine se. No verapramil.

## 2018-11-22 NOTE — DISCHARGE NOTE ADULT - CARE PLAN
Principal Discharge DX:	Bradycardia  Goal:	To maintain stable heartrate  Assessment and plan of treatment:	Take medications as prescribed and f/u as outpt within 2 weeks

## 2018-11-22 NOTE — PROGRESS NOTE ADULT - SUBJECTIVE AND OBJECTIVE BOX
Patient is a 73y old  Female who presents with a chief complaint of Symptomatic Bradycardia (21 Nov 2018 16:14)      T(F): 97 (11-22-18 @ 07:01), Max: 97 (11-22-18 @ 07:01)  HR: 77 (11-22-18 @ 05:30)  BP: 145/69 (11-22-18 @ 05:30)  RR: 18 (11-22-18 @ 07:01)  SpO2: 99% (11-21-18 @ 23:15) (99% - 99%)    PHYSICAL EXAM:  GENERAL: NAD, well-groomed, well-developed  HEAD:  Atraumatic, Normocephalic  EYES: EOMI, PERRLA, conjunctiva and sclera clear  ENMT: No tonsillar erythema, exudates, or enlargement; Moist mucous membranes, Good dentition, No lesions  NECK: Supple, No JVD, Normal thyroid  NERVOUS SYSTEM:  Alert & Oriented X3,  Motor Strength 5/5 B/L upper and lower extremities  CHEST/LUNG: Clear to percussion bilaterally; No rales, rhonchi, wheezing, or rubs  HEART: Regular rate and rhythm; No murmurs, rubs, or gallops  ABDOMEN: Soft, Nontender, Nondistended; Bowel sounds present  EXTREMITIES:   No clubbing, cyanosis, or edema  LYMPH: No lymphadenopathy noted  SKIN: No rashes or lesions    labs  11-22    139  |  105  |  33<H>  ----------------------------<  78  4.2   |  23  |  0.9    Ca    8.6      22 Nov 2018 06:36    TPro  5.2<L>  /  Alb  3.2<L>  /  TBili  0.5  /  DBili  <0.2  /  AST  22  /  ALT  43<H>  /  AlkPhos  58  11-22                          11.1   10.37 )-----------( 165      ( 22 Nov 2018 06:36 )             34.0           Creatine Kinase, Serum: 27 U/L (11-21-18 @ 14:41)  Troponin T, Serum: <0.01 ng/mL (11-21-18 @ 14:41)  Creatine Kinase, Serum: 26 U/L (11-21-18 @ 11:04)  Troponin T, Serum: <0.01 ng/mL (11-21-18 @ 11:04)      ALBUTerol/ipratropium for Nebulization 3 milliLiter(s) Nebulizer every 6 hours  aspirin enteric coated 81 milliGRAM(s) Oral daily  atorvastatin 80 milliGRAM(s) Oral at bedtime  buDESOnide 160 MICROgram(s)/formoterol 4.5 MICROgram(s) Inhaler 2 Puff(s) Inhalation two times a day  citalopram 30 milliGRAM(s) Oral daily  famotidine    Tablet 20 milliGRAM(s) Oral daily  gabapentin 300 milliGRAM(s) Oral three times a day  heparin  Injectable 5000 Unit(s) SubCutaneous every 8 hours  LORazepam     Tablet 0.5 milliGRAM(s) Oral two times a day  losartan 100 milliGRAM(s) Oral daily  metoprolol tartrate 75 milliGRAM(s) Oral two times a day  nitroglycerin     SubLingual 0.4 milliGRAM(s) SubLingual every 5 minutes PRN  polyethylene glycol 3350 17 Gram(s) Oral two times a day PRN  potassium chloride    Tablet ER 20 milliEquivalent(s) Oral daily  silver sulfADIAZINE 1% Cream 1 Application(s) Topical every 12 hours

## 2018-11-22 NOTE — DISCHARGE NOTE ADULT - CARE PROVIDER_API CALL
Hunter Chavez), Internal Medicine; Pulmonary Disease  [address] [facility] [address]  [place] [address]  Phone: [phone]  Fax: [phone]    Carl Bishop), Cardiovascular Disease; Internal Medicine  [address] [place] [id number]  Phone: [phone]  Fax: [phone]

## 2018-11-26 LAB
CORTICOSTEROID BINDING GLOBULIN RESULT: 3 MG/DL — SIGNIFICANT CHANGE UP
CORTIS F/TOTAL MFR SERPL: 39 % — SIGNIFICANT CHANGE UP
CORTIS SERPL-MCNC: 33 UG/DL — HIGH
CORTISOL, FREE RESULT: 13 UG/DL — HIGH

## 2018-11-26 NOTE — CHART NOTE - NSCHARTNOTEFT_GEN_A_CORE
Received report in my e-mail that patient's free cortisol and cortisol eos levels are high. I called Ms Henderson ot home at 1925 and advised her of these results (basically telling her that her cortisol level is high) and advising her to let her doctor know this the next time she visits him emphasizing that no immediate action is required at this time

## 2018-11-29 DIAGNOSIS — I25.10 ATHEROSCLEROTIC HEART DISEASE OF NATIVE CORONARY ARTERY WITHOUT ANGINA PECTORIS: ICD-10-CM
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
## 2018-11-29 DIAGNOSIS — Z88.6 ALLERGY STATUS TO ANALGESIC AGENT: ICD-10-CM

## 2018-11-29 DIAGNOSIS — Z90.49 ACQUIRED ABSENCE OF OTHER SPECIFIED PARTS OF DIGESTIVE TRACT: ICD-10-CM

## 2018-11-29 DIAGNOSIS — Z86.73 PERSONAL HISTORY OF TRANSIENT ISCHEMIC ATTACK (TIA), AND CEREBRAL INFARCTION WITHOUT RESIDUAL DEFICITS: ICD-10-CM

## 2018-11-29 DIAGNOSIS — I95.9 HYPOTENSION, UNSPECIFIED: ICD-10-CM

## 2018-11-29 DIAGNOSIS — K59.00 CONSTIPATION, UNSPECIFIED: ICD-10-CM

## 2018-11-29 DIAGNOSIS — I73.9 PERIPHERAL VASCULAR DISEASE, UNSPECIFIED: ICD-10-CM

## 2018-11-29 DIAGNOSIS — N17.9 ACUTE KIDNEY FAILURE, UNSPECIFIED: ICD-10-CM

## 2018-11-29 DIAGNOSIS — E66.9 OBESITY, UNSPECIFIED: ICD-10-CM

## 2018-11-29 DIAGNOSIS — Z88.5 ALLERGY STATUS TO NARCOTIC AGENT: ICD-10-CM

## 2018-11-29 DIAGNOSIS — Z79.82 LONG TERM (CURRENT) USE OF ASPIRIN: ICD-10-CM

## 2018-11-29 DIAGNOSIS — R74.0 NONSPECIFIC ELEVATION OF LEVELS OF TRANSAMINASE AND LACTIC ACID DEHYDROGENASE [LDH]: ICD-10-CM

## 2018-11-29 DIAGNOSIS — T46.1X1A POISONING BY CALCIUM-CHANNEL BLOCKERS, ACCIDENTAL (UNINTENTIONAL), INITIAL ENCOUNTER: ICD-10-CM

## 2018-11-29 DIAGNOSIS — I10 ESSENTIAL (PRIMARY) HYPERTENSION: ICD-10-CM

## 2018-11-29 DIAGNOSIS — R33.9 RETENTION OF URINE, UNSPECIFIED: ICD-10-CM

## 2018-11-29 DIAGNOSIS — E78.5 HYPERLIPIDEMIA, UNSPECIFIED: ICD-10-CM

## 2018-11-29 DIAGNOSIS — R00.1 BRADYCARDIA, UNSPECIFIED: ICD-10-CM

## 2018-11-29 DIAGNOSIS — Z87.891 PERSONAL HISTORY OF NICOTINE DEPENDENCE: ICD-10-CM

## 2018-11-29 DIAGNOSIS — J44.9 CHRONIC OBSTRUCTIVE PULMONARY DISEASE, UNSPECIFIED: ICD-10-CM

## 2018-11-29 DIAGNOSIS — I42.8 OTHER CARDIOMYOPATHIES: ICD-10-CM

## 2018-11-29 DIAGNOSIS — D72.829 ELEVATED WHITE BLOOD CELL COUNT, UNSPECIFIED: ICD-10-CM
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
## 2018-11-29 DIAGNOSIS — F32.9 MAJOR DEPRESSIVE DISORDER, SINGLE EPISODE, UNSPECIFIED: ICD-10-CM

## 2018-11-29 DIAGNOSIS — T21.02XA BURN OF UNSPECIFIED DEGREE OF ABDOMINAL WALL, INITIAL ENCOUNTER: ICD-10-CM

## 2018-12-07 NOTE — PHARMACOTHERAPY INTERVENTION NOTE - INTERVENTION CATEGORIES
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  Pt went to [facility] Colin at the [place] office and a urine test was taken as well as labs this morning.  Pt mother wants to know what pt should do?  Please advise.  Pt can be reached at home at [phone].[PF1.1M]   Next and Last Visit with Provider and Department  Next visit with JAJA MINER is on No match found  Next visit with UROLOGY is on No match found  Last visit with JAJA MINER was on 07/06/2016 at  4:10 PM in UROLOGY [facility]  Last visit with UROLOGY was on 07/06/2016 at  4:10 PM in [facility]OLOGY [facility]     WEIGHT AND HEIGHT: As of 06/12/2017 weight is 179.5 lbs.(81.421 kg).   BMI is 25.05 kg/(m^2) calculated from:     Height 6' .5\" (1.842 m) as of 7/7/16     Weight 187 lb 6.4 oz (85.004 kg) as of 7/7/16[PF1.1T]      Allergies     Allergen  Reactions   • Augmentin [Augmentin]    • Bee Pollen    • Cephalexin[PF1.2T]      No current outpatient prescriptions on file.      PHARMACY to use:[PF1.1T] [facility] [address][PF1.1M]          Pharmacy preference(s) on file: [facility] [address] ([address])    CALL BACK INFO:[PF1.1T] Ok to leave response (including medical information) on answering machine[PF1.1M]  ROUTING:[PF1.1T] Patient's physician/staff[PF1.1M]        PCP: No primary care provider on file.         INS: Payor: [employer] / Plan: B [id number] / Product Type: *No Product type* / Note: This is the primary coverage, but no account was found for this location  or the patient's primary location.   ADDRESS:  [address] [place] [id number][PF1.1T]         Revision History        User Key Date/Time User Provider Type Action    > PF1.2 06/12/17 11:35 AM Rosina Mendoza  Sign     PF1.1 06/12/17 11:28 AM Rosina Mendoza      M - Manual, T - Template

## 2018-12-18 ENCOUNTER — OUTPATIENT (OUTPATIENT)
Dept: OUTPATIENT SERVICES | Facility: HOSPITAL | Age: 73
LOS: 1 days | Discharge: HOME | End: 2018-12-18

## 2018-12-18 ENCOUNTER — APPOINTMENT (OUTPATIENT)
Dept: BURN CARE | Facility: CLINIC | Age: 73
End: 2018-12-18

## 2018-12-18 DIAGNOSIS — Z90.49 ACQUIRED ABSENCE OF OTHER SPECIFIED PARTS OF DIGESTIVE TRACT: Chronic | ICD-10-CM
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
## 2018-12-18 DIAGNOSIS — T31.0 BURNS INVOLVING LESS THAN 10% OF BODY SURFACE: ICD-10-CM

## 2018-12-18 DIAGNOSIS — T24.311A BURN OF THIRD DEGREE OF RIGHT THIGH, INITIAL ENCOUNTER: ICD-10-CM

## 2018-12-18 DIAGNOSIS — T21.32XA BURN OF THIRD DEGREE OF ABDOMINAL WALL, INITIAL ENCOUNTER: ICD-10-CM
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
## 2019-01-03 ENCOUNTER — APPOINTMENT (OUTPATIENT)
Dept: BURN CARE | Facility: CLINIC | Age: 74
End: 2019-01-03

## 2019-01-03 ENCOUNTER — OUTPATIENT (OUTPATIENT)
Dept: OUTPATIENT SERVICES | Facility: HOSPITAL | Age: 74
LOS: 1 days | Discharge: HOME | End: 2019-01-03

## 2019-01-03 DIAGNOSIS — Z90.49 ACQUIRED ABSENCE OF OTHER SPECIFIED PARTS OF DIGESTIVE TRACT: Chronic | ICD-10-CM

## 2019-01-03 DIAGNOSIS — T21.32XA BURN OF THIRD DEGREE OF ABDOMINAL WALL, INITIAL ENCOUNTER: ICD-10-CM
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
## 2019-01-03 DIAGNOSIS — T31.0 BURNS INVOLVING LESS THAN 10% OF BODY SURFACE: ICD-10-CM

## 2019-01-03 DIAGNOSIS — T24.311A BURN OF THIRD DEGREE OF RIGHT THIGH, INITIAL ENCOUNTER: ICD-10-CM
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
## 2019-01-17 ENCOUNTER — OUTPATIENT (OUTPATIENT)
Dept: OUTPATIENT SERVICES | Facility: HOSPITAL | Age: 74
LOS: 1 days | Discharge: HOME | End: 2019-01-17

## 2019-01-17 ENCOUNTER — APPOINTMENT (OUTPATIENT)
Dept: BURN CARE | Facility: CLINIC | Age: 74
End: 2019-01-17

## 2019-01-17 DIAGNOSIS — Z90.49 ACQUIRED ABSENCE OF OTHER SPECIFIED PARTS OF DIGESTIVE TRACT: Chronic | ICD-10-CM
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
## 2019-01-17 NOTE — PHYSICAL EXAM
[Healing] : healing [Size%: ______] : Size: [unfilled]% [Infected?] : Infected: No [3] : 3 out of 10 [Abnormal] : abnormal [Small] : small  [] : no [de-identified] : abdomen--> healed--> moisturizer\par \par right thigh--> heali--> ssd
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
## 2019-01-17 NOTE — PHYSICAL EXAM
[Healing] : healing [Size%: ______] : Size: [unfilled]% [Infected?] : Infected: No [3] : 3 out of 10 [Abnormal] : abnormal [Small] : small  [] : no [de-identified] : abdomen--> healed--> moisturizer\par \par right thigh--> heali--> ssd
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
## 2019-01-24 DIAGNOSIS — T24.711A: ICD-10-CM

## 2019-01-24 DIAGNOSIS — T21.32XA BURN OF THIRD DEGREE OF ABDOMINAL WALL, INITIAL ENCOUNTER: ICD-10-CM

## 2019-01-24 DIAGNOSIS — T31.0 BURNS INVOLVING LESS THAN 10% OF BODY SURFACE: ICD-10-CM

## 2019-02-07 ENCOUNTER — OUTPATIENT (OUTPATIENT)
Dept: OUTPATIENT SERVICES | Facility: HOSPITAL | Age: 74
LOS: 1 days | Discharge: HOME | End: 2019-02-07

## 2019-02-07 ENCOUNTER — APPOINTMENT (OUTPATIENT)
Dept: BURN CARE | Facility: CLINIC | Age: 74
End: 2019-02-07

## 2019-02-07 DIAGNOSIS — Z90.49 ACQUIRED ABSENCE OF OTHER SPECIFIED PARTS OF DIGESTIVE TRACT: Chronic | ICD-10-CM

## 2019-02-07 DIAGNOSIS — T24.309A BURN OF THIRD DEGREE OF UNSPECIFIED SITE OF UNSPECIFIED LOWER LIMB, EXCEPT ANKLE AND FOOT, INITIAL ENCOUNTER: ICD-10-CM

## 2019-02-11 PROBLEM — T24.309A 3RD DEGREE BURN OF LEG: Status: ACTIVE | Noted: 2018-12-18
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
## 2019-02-11 NOTE — PHYSICAL EXAM
[Closed] : closed [Size%: ______] : Size: [unfilled]% [Infected?] : Infected: No [3] : 3 out of 10 [Normal] : normal [None] : none [] : no [de-identified] : abdomen and right thigh--> healed--> moisturizer\par \par
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
## 2019-02-19 DIAGNOSIS — T21.32XD BURN OF THIRD DEGREE OF ABDOMINAL WALL, SUBSEQUENT ENCOUNTER: ICD-10-CM

## 2019-02-19 DIAGNOSIS — T24.311D BURN OF THIRD DEGREE OF RIGHT THIGH, SUBSEQUENT ENCOUNTER: ICD-10-CM
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
## 2019-04-12 ENCOUNTER — INPATIENT (INPATIENT)
Facility: HOSPITAL | Age: 74
LOS: 5 days | Discharge: HOME HEALTH | End: 2019-04-18
Attending: INTERNAL MEDICINE | Admitting: INTERNAL MEDICINE
Payer: MEDICARE

## 2019-04-12 VITALS
SYSTOLIC BLOOD PRESSURE: 128 MMHG | TEMPERATURE: 97 F | RESPIRATION RATE: 19 BRPM | HEART RATE: 71 BPM | DIASTOLIC BLOOD PRESSURE: 59 MMHG | OXYGEN SATURATION: 95 % | WEIGHT: 184.97 LBS

## 2019-04-12 DIAGNOSIS — Z90.49 ACQUIRED ABSENCE OF OTHER SPECIFIED PARTS OF DIGESTIVE TRACT: Chronic | ICD-10-CM

## 2019-04-12 LAB
ALBUMIN SERPL ELPH-MCNC: 3.9 G/DL — SIGNIFICANT CHANGE UP (ref 3.5–5.2)
ALP SERPL-CCNC: 125 U/L — HIGH (ref 30–115)
ALT FLD-CCNC: 22 U/L — SIGNIFICANT CHANGE UP (ref 0–41)
ANION GAP SERPL CALC-SCNC: 13 MMOL/L — SIGNIFICANT CHANGE UP (ref 7–14)
APTT BLD: 29.5 SEC — SIGNIFICANT CHANGE UP (ref 27–39.2)
AST SERPL-CCNC: 39 U/L — SIGNIFICANT CHANGE UP (ref 0–41)
BASOPHILS # BLD AUTO: 0.05 K/UL — SIGNIFICANT CHANGE UP (ref 0–0.2)
BASOPHILS NFR BLD AUTO: 0.6 % — SIGNIFICANT CHANGE UP (ref 0–1)
BILIRUB SERPL-MCNC: 0.2 MG/DL — SIGNIFICANT CHANGE UP (ref 0.2–1.2)
BUN SERPL-MCNC: 30 MG/DL — HIGH (ref 10–20)
CALCIUM SERPL-MCNC: 10.2 MG/DL — HIGH (ref 8.5–10.1)
CHLORIDE SERPL-SCNC: 100 MMOL/L — SIGNIFICANT CHANGE UP (ref 98–110)
CO2 SERPL-SCNC: 28 MMOL/L — SIGNIFICANT CHANGE UP (ref 17–32)
CREAT SERPL-MCNC: 1.2 MG/DL — SIGNIFICANT CHANGE UP (ref 0.7–1.5)
EOSINOPHIL # BLD AUTO: 0.39 K/UL — SIGNIFICANT CHANGE UP (ref 0–0.7)
EOSINOPHIL NFR BLD AUTO: 4.5 % — SIGNIFICANT CHANGE UP (ref 0–8)
GLUCOSE SERPL-MCNC: 99 MG/DL — SIGNIFICANT CHANGE UP (ref 70–99)
HCT VFR BLD CALC: 42.2 % — SIGNIFICANT CHANGE UP (ref 37–47)
HGB BLD-MCNC: 13.3 G/DL — SIGNIFICANT CHANGE UP (ref 12–16)
IMM GRANULOCYTES NFR BLD AUTO: 0.2 % — SIGNIFICANT CHANGE UP (ref 0.1–0.3)
INR BLD: 1.01 RATIO — SIGNIFICANT CHANGE UP (ref 0.65–1.3)
LYMPHOCYTES # BLD AUTO: 2.03 K/UL — SIGNIFICANT CHANGE UP (ref 1.2–3.4)
LYMPHOCYTES # BLD AUTO: 23.5 % — SIGNIFICANT CHANGE UP (ref 20.5–51.1)
MCHC RBC-ENTMCNC: 29.2 PG — SIGNIFICANT CHANGE UP (ref 27–31)
MCHC RBC-ENTMCNC: 31.5 G/DL — LOW (ref 32–37)
MCV RBC AUTO: 92.7 FL — SIGNIFICANT CHANGE UP (ref 81–99)
MONOCYTES # BLD AUTO: 1.17 K/UL — HIGH (ref 0.1–0.6)
MONOCYTES NFR BLD AUTO: 13.6 % — HIGH (ref 1.7–9.3)
NEUTROPHILS # BLD AUTO: 4.97 K/UL — SIGNIFICANT CHANGE UP (ref 1.4–6.5)
NEUTROPHILS NFR BLD AUTO: 57.6 % — SIGNIFICANT CHANGE UP (ref 42.2–75.2)
NRBC # BLD: 0 /100 WBCS — SIGNIFICANT CHANGE UP (ref 0–0)
PLATELET # BLD AUTO: 235 K/UL — SIGNIFICANT CHANGE UP (ref 130–400)
POTASSIUM SERPL-MCNC: 4.9 MMOL/L — SIGNIFICANT CHANGE UP (ref 3.5–5)
POTASSIUM SERPL-SCNC: 4.9 MMOL/L — SIGNIFICANT CHANGE UP (ref 3.5–5)
PROT SERPL-MCNC: 6.9 G/DL — SIGNIFICANT CHANGE UP (ref 6–8)
PROTHROM AB SERPL-ACNC: 11.6 SEC — SIGNIFICANT CHANGE UP (ref 9.95–12.87)
RBC # BLD: 4.55 M/UL — SIGNIFICANT CHANGE UP (ref 4.2–5.4)
RBC # FLD: 14.7 % — HIGH (ref 11.5–14.5)
SODIUM SERPL-SCNC: 141 MMOL/L — SIGNIFICANT CHANGE UP (ref 135–146)
WBC # BLD: 8.63 K/UL — SIGNIFICANT CHANGE UP (ref 4.8–10.8)
WBC # FLD AUTO: 8.63 K/UL — SIGNIFICANT CHANGE UP (ref 4.8–10.8)

## 2019-04-12 PROCEDURE — 99291 CRITICAL CARE FIRST HOUR: CPT

## 2019-04-12 PROCEDURE — 99292 CRITICAL CARE ADDL 30 MIN: CPT

## 2019-04-12 PROCEDURE — 71045 X-RAY EXAM CHEST 1 VIEW: CPT | Mod: 26

## 2019-04-12 RX ORDER — IPRATROPIUM/ALBUTEROL SULFATE 18-103MCG
3 AEROSOL WITH ADAPTER (GRAM) INHALATION ONCE
Qty: 0 | Refills: 0 | Status: COMPLETED | OUTPATIENT
Start: 2019-04-12 | End: 2019-04-12

## 2019-04-12 RX ORDER — FAMOTIDINE 10 MG/ML
20 INJECTION INTRAVENOUS ONCE
Qty: 0 | Refills: 0 | Status: COMPLETED | OUTPATIENT
Start: 2019-04-12 | End: 2019-04-12

## 2019-04-12 RX ORDER — EPINEPHRINE 11.25MG/ML
0.5 SOLUTION, NON-ORAL INHALATION ONCE
Qty: 0 | Refills: 0 | Status: COMPLETED | OUTPATIENT
Start: 2019-04-12 | End: 2019-04-12

## 2019-04-12 RX ORDER — DIPHENHYDRAMINE HCL 50 MG
50 CAPSULE ORAL ONCE
Qty: 0 | Refills: 0 | Status: COMPLETED | OUTPATIENT
Start: 2019-04-12 | End: 2019-04-12

## 2019-04-12 RX ADMIN — Medication 50 MILLIGRAM(S): at 18:50

## 2019-04-12 RX ADMIN — Medication 0.5 MILLILITER(S): at 18:51

## 2019-04-12 RX ADMIN — Medication 3 MILLILITER(S): at 20:32

## 2019-04-12 RX ADMIN — Medication 125 MILLIGRAM(S): at 18:50

## 2019-04-12 RX ADMIN — FAMOTIDINE 20 MILLIGRAM(S): 10 INJECTION INTRAVENOUS at 18:51

## 2019-04-12 NOTE — ED PROVIDER NOTE - PROGRESS NOTE DETAILS
Patient looks much better, Voice back to normal, States she feels much better.  No SOB. Dr June accepts, Patient evaluated by Dr Baig , will take patient to ICU

## 2019-04-12 NOTE — ED PROVIDER NOTE - OBJECTIVE STATEMENT
Patient BIBA for allergic reaction, Given .3 epi by EMS PTA. Went into home after floor polyurethane. C/o SOB, difficulty swallowing, throat feels tight, slight improvement with epi.

## 2019-04-12 NOTE — ED PROVIDER NOTE - ATTENDING CONTRIBUTION TO CARE
I was present for and supervised the key and critical aspects of the procedures performed during the care of the patient. Patient presents for evaluation of shortness of breath as well as tongue swelling and difficulty swallowing that started after returning home they had recently had work done to the floors at home with chemicals she states that shortly after entering her house she became symptomatic with no chest pain but became concerned about potential allergic reaction, she was given epi pre-hospital.  On physical exam she is no resp distress but has tongue swelling, with phonation changes, she has no accessory use, based on her symptoms we placed her on a monitor and prepared for emergency airway however, she improved with treatment, we consulted ICU who reccomends admission for further monitoring at this time

## 2019-04-12 NOTE — ED PROVIDER NOTE - CARE PLAN
Principal Discharge DX:	Allergic reaction  Secondary Diagnosis:	Angioedema  Secondary Diagnosis:	COPD exacerbation

## 2019-04-12 NOTE — ED ADULT NURSE NOTE - NSIMPLEMENTINTERV_GEN_ALL_ED
Implemented All Universal Safety Interventions:  [place] to call system. Call bell, personal items and telephone within reach. Instruct patient to call for assistance. Room bathroom lighting operational. Non-slip footwear when patient is off stretcher. Physically safe environment: no spills, clutter or unnecessary equipment. Stretcher in lowest position, wheels locked, appropriate side rails in place.

## 2019-04-12 NOTE — ED PROVIDER NOTE - CRITICAL CARE PROVIDED
direct patient care (not related to procedure)/consult w/ pt's family directly relating to pts condition/additional history taking/consultation with other physicians/documentation/interpretation of diagnostic studies

## 2019-04-12 NOTE — ED PROVIDER NOTE - CLINICAL SUMMARY MEDICAL DECISION MAKING FREE TEXT BOX
Patient presents for evaluation of allergic reaction, with phonation changes and a swollen tongue given epinephrine pre-hospital, we administered racemic epi, albuterol, administered solumedrol, benadryl. Patient will need admission to ICU for further monitoring

## 2019-04-12 NOTE — ED ADULT TRIAGE NOTE - CHIEF COMPLAINT QUOTE
BIBA pt trouble swallowing. Tongue swelling. EMS administered Epi 0.3 IM. Leana was doing floors that when pt had SOB

## 2019-04-13 LAB
ALBUMIN SERPL ELPH-MCNC: 3.8 G/DL — SIGNIFICANT CHANGE UP (ref 3.5–5.2)
ALP SERPL-CCNC: 125 U/L — HIGH (ref 30–115)
ALT FLD-CCNC: 21 U/L — SIGNIFICANT CHANGE UP (ref 0–41)
ANION GAP SERPL CALC-SCNC: 16 MMOL/L — HIGH (ref 7–14)
AST SERPL-CCNC: 27 U/L — SIGNIFICANT CHANGE UP (ref 0–41)
BILIRUB SERPL-MCNC: 0.2 MG/DL — SIGNIFICANT CHANGE UP (ref 0.2–1.2)
BUN SERPL-MCNC: 32 MG/DL — HIGH (ref 10–20)
CALCIUM SERPL-MCNC: 9.9 MG/DL — SIGNIFICANT CHANGE UP (ref 8.5–10.1)
CHLORIDE SERPL-SCNC: 104 MMOL/L — SIGNIFICANT CHANGE UP (ref 98–110)
CO2 SERPL-SCNC: 23 MMOL/L — SIGNIFICANT CHANGE UP (ref 17–32)
CREAT SERPL-MCNC: 1.2 MG/DL — SIGNIFICANT CHANGE UP (ref 0.7–1.5)
GLUCOSE SERPL-MCNC: 147 MG/DL — HIGH (ref 70–99)
HCT VFR BLD CALC: 40.9 % — SIGNIFICANT CHANGE UP (ref 37–47)
HGB BLD-MCNC: 13 G/DL — SIGNIFICANT CHANGE UP (ref 12–16)
MCHC RBC-ENTMCNC: 29 PG — SIGNIFICANT CHANGE UP (ref 27–31)
MCHC RBC-ENTMCNC: 31.8 G/DL — LOW (ref 32–37)
MCV RBC AUTO: 91.3 FL — SIGNIFICANT CHANGE UP (ref 81–99)
NRBC # BLD: 0 /100 WBCS — SIGNIFICANT CHANGE UP (ref 0–0)
PLATELET # BLD AUTO: 215 K/UL — SIGNIFICANT CHANGE UP (ref 130–400)
POTASSIUM SERPL-MCNC: 4.1 MMOL/L — SIGNIFICANT CHANGE UP (ref 3.5–5)
POTASSIUM SERPL-SCNC: 4.1 MMOL/L — SIGNIFICANT CHANGE UP (ref 3.5–5)
PROT SERPL-MCNC: 6.3 G/DL — SIGNIFICANT CHANGE UP (ref 6–8)
RBC # BLD: 4.48 M/UL — SIGNIFICANT CHANGE UP (ref 4.2–5.4)
RBC # FLD: 14.6 % — HIGH (ref 11.5–14.5)
SODIUM SERPL-SCNC: 143 MMOL/L — SIGNIFICANT CHANGE UP (ref 135–146)
WBC # BLD: 7.53 K/UL — SIGNIFICANT CHANGE UP (ref 4.8–10.8)
WBC # FLD AUTO: 7.53 K/UL — SIGNIFICANT CHANGE UP (ref 4.8–10.8)

## 2019-04-13 PROCEDURE — 71045 X-RAY EXAM CHEST 1 VIEW: CPT | Mod: 26

## 2019-04-13 RX ORDER — ATORVASTATIN CALCIUM 80 MG/1
80 TABLET, FILM COATED ORAL AT BEDTIME
Qty: 0 | Refills: 0 | Status: DISCONTINUED | OUTPATIENT
Start: 2019-04-13 | End: 2019-04-18

## 2019-04-13 RX ORDER — CITALOPRAM 10 MG/1
40 TABLET, FILM COATED ORAL DAILY
Qty: 0 | Refills: 0 | Status: DISCONTINUED | OUTPATIENT
Start: 2019-04-13 | End: 2019-04-18

## 2019-04-13 RX ORDER — FUROSEMIDE 40 MG
40 TABLET ORAL EVERY 12 HOURS
Qty: 0 | Refills: 0 | Status: DISCONTINUED | OUTPATIENT
Start: 2019-04-13 | End: 2019-04-14

## 2019-04-13 RX ORDER — ASPIRIN/CALCIUM CARB/MAGNESIUM 324 MG
81 TABLET ORAL DAILY
Qty: 0 | Refills: 0 | Status: DISCONTINUED | OUTPATIENT
Start: 2019-04-13 | End: 2019-04-18

## 2019-04-13 RX ORDER — PANTOPRAZOLE SODIUM 20 MG/1
40 TABLET, DELAYED RELEASE ORAL
Qty: 0 | Refills: 0 | Status: DISCONTINUED | OUTPATIENT
Start: 2019-04-13 | End: 2019-04-18

## 2019-04-13 RX ORDER — BUDESONIDE AND FORMOTEROL FUMARATE DIHYDRATE 160; 4.5 UG/1; UG/1
2 AEROSOL RESPIRATORY (INHALATION)
Qty: 0 | Refills: 0 | Status: DISCONTINUED | OUTPATIENT
Start: 2019-04-13 | End: 2019-04-18

## 2019-04-13 RX ORDER — TIOTROPIUM BROMIDE 18 UG/1
1 CAPSULE ORAL; RESPIRATORY (INHALATION) DAILY
Qty: 0 | Refills: 0 | Status: DISCONTINUED | OUTPATIENT
Start: 2019-04-13 | End: 2019-04-18

## 2019-04-13 RX ORDER — METOPROLOL TARTRATE 50 MG
75 TABLET ORAL
Qty: 0 | Refills: 0 | Status: DISCONTINUED | OUTPATIENT
Start: 2019-04-13 | End: 2019-04-18

## 2019-04-13 RX ORDER — ACETAMINOPHEN 500 MG
650 TABLET ORAL EVERY 6 HOURS
Qty: 0 | Refills: 0 | Status: DISCONTINUED | OUTPATIENT
Start: 2019-04-13 | End: 2019-04-18

## 2019-04-13 RX ORDER — GABAPENTIN 400 MG/1
300 CAPSULE ORAL THREE TIMES A DAY
Qty: 0 | Refills: 0 | Status: DISCONTINUED | OUTPATIENT
Start: 2019-04-13 | End: 2019-04-18

## 2019-04-13 RX ORDER — METOPROLOL TARTRATE 50 MG
50 TABLET ORAL
Qty: 0 | Refills: 0 | Status: DISCONTINUED | OUTPATIENT
Start: 2019-04-13 | End: 2019-04-13

## 2019-04-13 RX ORDER — ENOXAPARIN SODIUM 100 MG/ML
40 INJECTION SUBCUTANEOUS DAILY
Qty: 0 | Refills: 0 | Status: DISCONTINUED | OUTPATIENT
Start: 2019-04-13 | End: 2019-04-18

## 2019-04-13 RX ADMIN — Medication 1 APPLICATION(S): at 06:48

## 2019-04-13 RX ADMIN — Medication 0.5 MILLIGRAM(S): at 21:24

## 2019-04-13 RX ADMIN — BUDESONIDE AND FORMOTEROL FUMARATE DIHYDRATE 2 PUFF(S): 160; 4.5 AEROSOL RESPIRATORY (INHALATION) at 07:24

## 2019-04-13 RX ADMIN — TIOTROPIUM BROMIDE 1 CAPSULE(S): 18 CAPSULE ORAL; RESPIRATORY (INHALATION) at 07:24

## 2019-04-13 RX ADMIN — GABAPENTIN 300 MILLIGRAM(S): 400 CAPSULE ORAL at 14:38

## 2019-04-13 RX ADMIN — Medication 75 MILLIGRAM(S): at 17:19

## 2019-04-13 RX ADMIN — ENOXAPARIN SODIUM 40 MILLIGRAM(S): 100 INJECTION SUBCUTANEOUS at 12:01

## 2019-04-13 RX ADMIN — Medication 60 MILLIGRAM(S): at 14:38

## 2019-04-13 RX ADMIN — Medication 60 MILLIGRAM(S): at 06:33

## 2019-04-13 RX ADMIN — ATORVASTATIN CALCIUM 80 MILLIGRAM(S): 80 TABLET, FILM COATED ORAL at 21:12

## 2019-04-13 RX ADMIN — Medication 650 MILLIGRAM(S): at 05:18

## 2019-04-13 RX ADMIN — Medication 0.5 MILLIGRAM(S): at 12:15

## 2019-04-13 RX ADMIN — Medication 650 MILLIGRAM(S): at 06:41

## 2019-04-13 RX ADMIN — Medication 40 MILLIGRAM(S): at 17:18

## 2019-04-13 RX ADMIN — CITALOPRAM 40 MILLIGRAM(S): 10 TABLET, FILM COATED ORAL at 12:02

## 2019-04-13 RX ADMIN — GABAPENTIN 300 MILLIGRAM(S): 400 CAPSULE ORAL at 21:12

## 2019-04-13 RX ADMIN — Medication 40 MILLIGRAM(S): at 06:47

## 2019-04-13 RX ADMIN — Medication 81 MILLIGRAM(S): at 12:02

## 2019-04-13 RX ADMIN — GABAPENTIN 300 MILLIGRAM(S): 400 CAPSULE ORAL at 06:47

## 2019-04-13 RX ADMIN — Medication 1 APPLICATION(S): at 17:19

## 2019-04-13 RX ADMIN — Medication 100 MILLIGRAM(S): at 21:12

## 2019-04-13 RX ADMIN — Medication 60 MILLIGRAM(S): at 21:12

## 2019-04-13 RX ADMIN — BUDESONIDE AND FORMOTEROL FUMARATE DIHYDRATE 2 PUFF(S): 160; 4.5 AEROSOL RESPIRATORY (INHALATION) at 22:17

## 2019-04-13 RX ADMIN — PANTOPRAZOLE SODIUM 40 MILLIGRAM(S): 20 TABLET, DELAYED RELEASE ORAL at 07:24

## 2019-04-13 NOTE — H&P ADULT - NSHPPHYSICALEXAM_GEN_ALL_CORE
GENERAL: NAD, well-developed, Non-toxic, stated age   HEAD:  Atraumatic, Normocephalic  EYES: EOMI, Sclera White   NECK: Supple, No JVD  CHEST/LUNG: Clear to auscultation bilaterally; No wheezing, rhonchi, or crackles  HEART: Regular rate and rhythm; s1, s2, No murmurs, rubs, or gallops  ABDOMEN: Soft, Nontender, Nondistended; Bowel sounds present, No rebound or guarding noted   EXTREMITIES:  No lower extremity edema or calf tenderness to palpation.  No clubbing or cyanosis  PSYCH: AAOx3  NEUROLOGY: non-focal  SKIN: No rashes or lesions

## 2019-04-13 NOTE — CONSULT NOTE ADULT - ASSESSMENT
Acute respiratory failure   copd exacerbation   ? component of angioedema  LLL infiltrate     - monitor in ICU  - nebs q4h prn  - lasix po  - O2 prn  - cpap at night  - repeat xray in AM  cont solumedrol 60 q 12  neb as needed  NIV if needed  hold off abx  BNP  CE    DLD/ CHF/ COPD   - continue home medications    WILL FOLLOW

## 2019-04-13 NOTE — H&P ADULT - ASSESSMENT
74F with COPD, Depression and TIA presnets from home for sudden onset shortness of breath.    Acute Hypoxic Repiratory Distress: Multifactorial COPD exacerbation/Reactive airway disease and component of angioedema  Breathing improved now   Continue with Solumedrol  Cpap at night   Duonebs PRN  CXR show b/l vascular prominence but no crackles on exam. On lasix 40 IV q12.   Continue for 1 more day then decrease  Monitor BUN/Cr   Well get s/s eval, patient complaining of mintor difficulty swallowing     COPD: Cont with some meds and steroids     Depression: Cont with home meds     GI ppx: ppi while on steroids   DVT ppx: on lovenox  Full Code   Diet: regular   Activity: As tolerated

## 2019-04-13 NOTE — H&P ADULT - NSICDXPASTMEDICALHX_GEN_ALL_CORE_FT
PAST MEDICAL HISTORY:  COPD (chronic obstructive pulmonary disease)     Depression     Hypertension     Other cardiomyopathy     Other emphysema     PVD (peripheral vascular disease)     TIA (transient ischemic attack)

## 2019-04-13 NOTE — H&P ADULT - HISTORY OF PRESENT ILLNESS
74F with COPD, Depression and TIA presnets from home for sudden onset shortness of breath. Patient went to [facility] and smelled cleaning supplies/chemical and throat began closing, and felt scratchy and came to ED/ While in ED patient was give Duonebs and racemic epi with improvement in respiratory status. Was sent to MICU for monitoring overnight. Patient improved and asymptomatic now

## 2019-04-13 NOTE — CONSULT NOTE ADULT - ASSESSMENT
Acute respiratory failure likely secondary to hyperactive airway secondary to inhaled  - monitor in ICU  - nebs q4h prn  - lasix q12h for 24h, switch to PO  - O2 prn  - cpap at night  - repeat xray in AM    DLD/ CHF/ COPD   - continue home medications Acute respiratory failure/ copd exacerbation/ ? component of angioedema    - monitor in ICU  - nebs q4h prn  - lasix po  - O2 prn  - cpap at night  - repeat xray in AM  solumedrol 60 q 12  neb as needed  NIV if needed  hold off abx  BNP  CE    DLD/ CHF/ COPD   - continue home medications    FERMIN FOLLOW

## 2019-04-13 NOTE — H&P ADULT - NSHPSOCIALHISTORY_GEN_ALL_CORE
Substance Use (street drugs): (x  ) never used  (  ) other:  Tobacco Usage:  (   ) never smoked   ( x  ) former smoker   (   ) current smoker: >60 pack years, quit 15 years ago   Alcohol Usage: Denied

## 2019-04-13 NOTE — H&P ADULT - ATTENDING COMMENTS
Patient was seen independently. Latest vital signs and labs were reviewed. Case was discussed with resident in morning rounds.  Agree with resident's assessment & plan which was reviewed by me and modified where necessary.

## 2019-04-13 NOTE — CONSULT NOTE ADULT - SUBJECTIVE AND OBJECTIVE BOX
Date of Admission: 2019    CHIEF COMPLAINT: shortness of breath, high pitched voice    HISTORY OF PRESENT ILLNESS: 74yFemale with PMH below presented to the hospital for above CC that started 2 hours after she had entered into her daughter's house after the floors were restained and she felt her throat close up. Per  she had a muffled voice and was wheezing. In the Er she received racemic epinephrine, nebulizer treatments and had improved significantly. Now not SOB, no chest pain.     PAST MEDICAL & SURGICAL HISTORY:  PVD (peripheral vascular disease)  Other emphysema  Other cardiomyopathy  TIA (transient ischemic attack)  COPD (chronic obstructive pulmonary disease)  Depression  Hypertension  History of cholecystectomy    HEALTH ISSUES - PROBLEM Dx:        FAMILY HISTORY:  No pertinent family history in first degree relatives    None [ ]  Mother:   Father:   Siblings:     SOCIAL HISTORY:    [ ] Non-smoker  [ ] Smoker  [ ] Alcohol    Allergies    albuterol (Unknown)  codeine (Other (Moderate))  Depakote (Unknown)    Intolerances    	    REVIEW OF SYSTEMS:  CONSTITUTIONAL: No fever, weight loss, or fatigue  CARDIOLOGY: PAtient denies chest pain, shortness of breath or syncopal episodes.   RESPIRATORY: see HPI  NEUROLOGICAL: NO weakness, no focal deficits to report.  ENDOCRINOLOGICAL: no recent change in diabetic medications.   GI: no BRBPR, no N,V,diarrhea.    PSYCHIATRY: normal mood and affect  HEENT: no nasal discharge, no ecchymosis  SKIN: no ecchymosis, no breakdown  MUSCULOSKELETAL: Full range of motion x4.      PHYSICAL EXAM:  T(C): 36.3 (19 @ 00:15), Max: 36.3 (19 @ 00:15)  HR: 74 (19 @ 00:15) (71 - 74)  BP: 122/60 (19 @ 00:12) (122/60 - 177/78)  RR: 18 (19 @ 00:15) (12 - 19)  SpO2: 93% (19 @ 00:15) (93% - 100%)  Wt(kg): --  I&O's Summary    Daily Height in cm: 157.48 (2019 00:15)    Daily Weight in k.5 (2019 00:15)    General Appearance: Normal for age and gender	  Cardiovascular: Normal S1 S2, No JVD, No murmurs, No edema  Respiratory: mild expiratory wheeze	  Psychiatry: A & O x 3, Mood & affect appropriate  Gastrointestinal:  Soft, Non-tender  Skin: No rashes, No ecchymoses, No cyanosis	  Neurologic: Non-focal  Musculoskeletal/ extremities: Normal range of motion, No clubbing, cyanosis or edema  Vascular: Peripheral pulses palpable 2+ bilaterally    LABS:	 	                          13.3   8.63  )-----------( 235      ( 2019 18:40 )             42.2     04-12    141  |  100  |  30<H>  ----------------------------<  99  4.9   |  28  |  1.2    Ca    10.2<H>      2019 18:40    TPro  6.9  /  Alb  3.9  /  TBili  0.2  /  DBili  x   /  AST  39  /  ALT  22  /  AlkPhos  125<H>  04-12        PT/INR - ( 2019 18:40 )   PT: 11.60 sec;   INR: 1.01 ratio         PTT - ( 2019 18:40 )  PTT:29.5 sec    CARDIAC MARKERS:            TELEMETRY EVENTS: 	    ECG:  	sinus tach  RADIOLOGY:  OTHER: 	    PREVIOUS DIAGNOSTIC TESTING:    [ ] Echocardiogram:  [ ]  Catheterization:  [ ] Stress Test:  	  	    Home Medications:  aspirin 81 mg oral tablet: 1 tab(s) orally once a day (2018 11:08)  atorvastatin 80 mg oral tablet: 1 tab(s) orally once a day (2018 11:08)  ciclopirox 0.77% topical cream: Apply topically to affected area 2 times a day (2018 11:08)  citalopram: 30 milligram(s) orally once a day (2018 11:08)  Fish Oil 1200 mg oral capsule: twice a day (2018 11:08)  gabapentin 300 mg oral capsule: 1 cap(s) orally 3 times a day (2018 15:11)  LORazepam 0.5 mg oral tablet: 1 tab(s) orally 2 times a day (2018 16:18)  losartan-hydroCHLOROthiazide 100 mg-25 mg oral tablet: 1 tab(s) orally once a day (2018 11:08)  raNITIdine 150 mg oral capsule: 1 cap(s) orally 2 times a day (2018 11:08)    MEDICATIONS  (STANDING):  aspirin  chewable 81 milliGRAM(s) Oral daily  atorvastatin 80 milliGRAM(s) Oral at bedtime  buDESOnide 160 MICROgram(s)/formoterol 4.5 MICROgram(s) Inhaler 2 Puff(s) Inhalation two times a day  citalopram 40 milliGRAM(s) Oral daily  enoxaparin Injectable 40 milliGRAM(s) SubCutaneous daily  furosemide   Injectable 40 milliGRAM(s) IV Push every 12 hours  gabapentin 300 milliGRAM(s) Oral three times a day  metoprolol tartrate 50 milliGRAM(s) Oral two times a day  pantoprazole    Tablet 40 milliGRAM(s) Oral before breakfast  silver sulfADIAZINE 1% Cream 1 Application(s) Topical every 12 hours  tiotropium 18 MICROgram(s) Capsule 1 Capsule(s) Inhalation daily    MEDICATIONS  (PRN):  LORazepam     Tablet 0.5 milliGRAM(s) Oral two times a day PRN Anxiety Date of Admission: 2019    CHIEF COMPLAINT: shortness of breath    HISTORY OF PRESENT ILLNESS: 74yFemale with PMH below presented to the hospital for above CC that started 2 hours after she had entered into her daughter's house after the floors were restained and she felt her throat close up. Per  she had a muffled voice and was wheezing. In the Er she received racemic epinephrine, nebulizer treatments and had improved significantly. Now not SOB, no chest pain. called for MICU for 24 h monitoring    PAST MEDICAL & SURGICAL HISTORY:  PVD (peripheral vascular disease)  Other emphysema  Other cardiomyopathy  TIA (transient ischemic attack)  COPD (chronic obstructive pulmonary disease)  Depression  Hypertension  History of cholecystectomy    HEALTH ISSUES - PROBLEM Dx:        FAMILY HISTORY:  No pertinent family history in first degree relatives    SOCIAL HISTORY:    [ex ] Non-smoker      Allergies    albuterol (Unknown)  codeine (Other (Moderate))  Depakote (Unknown)    Intolerances    	    REVIEW OF SYSTEMS:  CONSTITUTIONAL: No fever, weight loss, or fatigue  CARDIOLOGY: PAtient denies chest pain, shortness of breath or syncopal episodes.   RESPIRATORY: see HPI  NEUROLOGICAL: NO weakness, no focal deficits to report.  ENDOCRINOLOGICAL: no recent change in diabetic medications.   GI: no BRBPR, no N,V,diarrhea.    PSYCHIATRY: normal mood and affect  HEENT: no nasal discharge, no ecchymosis  SKIN: no ecchymosis, no breakdown  MUSCULOSKELETAL: Full range of motion x4.      PHYSICAL EXAM:  T(C): 36.3 (19 @ 00:15), Max: 36.3 (19 @ 00:15)  HR: 74 (19 @ 00:15) (71 - 74)  BP: 122/60 (19 @ 00:12) (122/60 - 177/78)  RR: 18 (19 @ 00:15) (12 - 19)  SpO2: 93% (19 @ 00:15) (93% - 100%)  I&O's Summary    Daily Height in cm: 157.48 (2019 00:15)    Daily Weight in k.5 (2019 00:15)    General Appearance: Normal for age and gender, hoarse voice	  Cardiovascular: Normal S1 S2, No JVD, No murmurs, No edema  Respiratory: mild expiratory wheeze	  Psychiatry: A & O x 3, Mood & affect appropriate  Gastrointestinal:  Soft, Non-tender  Skin: No rashes, No ecchymoses, No cyanosis	  Neurologic: Non-focal  Musculoskeletal/ extremities: Normal range of motion, No clubbing, cyanosis or edema  Vascular: Peripheral pulses palpable 2+ bilaterally    LABS:	 	                          13.3   8.63  )-----------( 235      ( 2019 18:40 )             42.2     04-12    141  |  100  |  30<H>  ----------------------------<  99  4.9   |  28  |  1.2    Ca    10.2<H>      2019 18:40    TPro  6.9  /  Alb  3.9  /  TBili  0.2  /  DBili  x   /  AST  39  /  ALT  22  /  AlkPhos  125<H>  -12        PT/INR - ( 2019 18:40 )   PT: 11.60 sec;   INR: 1.01 ratio         PTT - ( 2019 18:40 )  PTT:29.5 sec    CARDIAC MARKERS:            TELEMETRY EVENTS: 	    ECG:  	sinus tach    	    Home Medications:  aspirin 81 mg oral tablet: 1 tab(s) orally once a day (2018 11:08)  atorvastatin 80 mg oral tablet: 1 tab(s) orally once a day (2018 11:08)  ciclopirox 0.77% topical cream: Apply topically to affected area 2 times a day (2018 11:08)  citalopram: 30 milligram(s) orally once a day (2018 11:08)  Fish Oil 1200 mg oral capsule: twice a day (2018 11:08)  gabapentin 300 mg oral capsule: 1 cap(s) orally 3 times a day (2018 15:11)  LORazepam 0.5 mg oral tablet: 1 tab(s) orally 2 times a day (2018 16:18)  losartan-hydroCHLOROthiazide 100 mg-25 mg oral tablet: 1 tab(s) orally once a day (2018 11:08)  raNITIdine 150 mg oral capsule: 1 cap(s) orally 2 times a day (2018 11:08)    MEDICATIONS  (STANDING):  aspirin  chewable 81 milliGRAM(s) Oral daily  atorvastatin 80 milliGRAM(s) Oral at bedtime  buDESOnide 160 MICROgram(s)/formoterol 4.5 MICROgram(s) Inhaler 2 Puff(s) Inhalation two times a day  citalopram 40 milliGRAM(s) Oral daily  enoxaparin Injectable 40 milliGRAM(s) SubCutaneous daily  furosemide   Injectable 40 milliGRAM(s) IV Push every 12 hours  gabapentin 300 milliGRAM(s) Oral three times a day  metoprolol tartrate 50 milliGRAM(s) Oral two times a day  pantoprazole    Tablet 40 milliGRAM(s) Oral before breakfast  silver sulfADIAZINE 1% Cream 1 Application(s) Topical every 12 hours  tiotropium 18 MICROgram(s) Capsule 1 Capsule(s) Inhalation daily    MEDICATIONS  (PRN):  LORazepam     Tablet 0.5 milliGRAM(s) Oral two times a day PRN Anxiety

## 2019-04-13 NOTE — H&P ADULT - NSHPLABSRESULTS_GEN_ALL_CORE
13.0   7.53  )-----------( 215      ( 13 Apr 2019 06:19 )             40.9       04-13    143  |  104  |  32<H>  ----------------------------<  147<H>  4.1   |  23  |  1.2    Ca    9.9      13 Apr 2019 06:19    TPro  6.3  /  Alb  3.8  /  TBili  0.2  /  DBili  x   /  AST  27  /  ALT  21  /  AlkPhos  125<H>  04-13            PT/INR - ( 12 Apr 2019 18:40 )   PT: 11.60 sec;   INR: 1.01 ratio    PTT - ( 12 Apr 2019 18:40 )  PTT:29.5 sec

## 2019-04-14 LAB
ALBUMIN SERPL ELPH-MCNC: 4.1 G/DL — SIGNIFICANT CHANGE UP (ref 3.5–5.2)
ALP SERPL-CCNC: 120 U/L — HIGH (ref 30–115)
ALT FLD-CCNC: 20 U/L — SIGNIFICANT CHANGE UP (ref 0–41)
ANION GAP SERPL CALC-SCNC: 14 MMOL/L — SIGNIFICANT CHANGE UP (ref 7–14)
AST SERPL-CCNC: 28 U/L — SIGNIFICANT CHANGE UP (ref 0–41)
BASOPHILS # BLD AUTO: 0.01 K/UL — SIGNIFICANT CHANGE UP (ref 0–0.2)
BASOPHILS NFR BLD AUTO: 0.1 % — SIGNIFICANT CHANGE UP (ref 0–1)
BILIRUB SERPL-MCNC: 0.3 MG/DL — SIGNIFICANT CHANGE UP (ref 0.2–1.2)
BUN SERPL-MCNC: 47 MG/DL — HIGH (ref 10–20)
CALCIUM SERPL-MCNC: 9.7 MG/DL — SIGNIFICANT CHANGE UP (ref 8.5–10.1)
CHLORIDE SERPL-SCNC: 101 MMOL/L — SIGNIFICANT CHANGE UP (ref 98–110)
CO2 SERPL-SCNC: 28 MMOL/L — SIGNIFICANT CHANGE UP (ref 17–32)
CREAT SERPL-MCNC: 1.3 MG/DL — SIGNIFICANT CHANGE UP (ref 0.7–1.5)
EOSINOPHIL # BLD AUTO: 0 K/UL — SIGNIFICANT CHANGE UP (ref 0–0.7)
EOSINOPHIL NFR BLD AUTO: 0 % — SIGNIFICANT CHANGE UP (ref 0–8)
GLUCOSE SERPL-MCNC: 134 MG/DL — HIGH (ref 70–99)
HCT VFR BLD CALC: 40.5 % — SIGNIFICANT CHANGE UP (ref 37–47)
HCV AB S/CO SERPL IA: 0.12 S/CO — SIGNIFICANT CHANGE UP (ref 0–0.99)
HCV AB SERPL-IMP: SIGNIFICANT CHANGE UP
HGB BLD-MCNC: 13 G/DL — SIGNIFICANT CHANGE UP (ref 12–16)
IMM GRANULOCYTES NFR BLD AUTO: 0.5 % — HIGH (ref 0.1–0.3)
LYMPHOCYTES # BLD AUTO: 0.66 K/UL — LOW (ref 1.2–3.4)
LYMPHOCYTES # BLD AUTO: 6.4 % — LOW (ref 20.5–51.1)
MAGNESIUM SERPL-MCNC: 2 MG/DL — SIGNIFICANT CHANGE UP (ref 1.8–2.4)
MCHC RBC-ENTMCNC: 29.3 PG — SIGNIFICANT CHANGE UP (ref 27–31)
MCHC RBC-ENTMCNC: 32.1 G/DL — SIGNIFICANT CHANGE UP (ref 32–37)
MCV RBC AUTO: 91.2 FL — SIGNIFICANT CHANGE UP (ref 81–99)
MONOCYTES # BLD AUTO: 0.2 K/UL — SIGNIFICANT CHANGE UP (ref 0.1–0.6)
MONOCYTES NFR BLD AUTO: 1.9 % — SIGNIFICANT CHANGE UP (ref 1.7–9.3)
NEUTROPHILS # BLD AUTO: 9.44 K/UL — HIGH (ref 1.4–6.5)
NEUTROPHILS NFR BLD AUTO: 91.1 % — HIGH (ref 42.2–75.2)
NRBC # BLD: 0 /100 WBCS — SIGNIFICANT CHANGE UP (ref 0–0)
PLATELET # BLD AUTO: 255 K/UL — SIGNIFICANT CHANGE UP (ref 130–400)
POTASSIUM SERPL-MCNC: 4.3 MMOL/L — SIGNIFICANT CHANGE UP (ref 3.5–5)
POTASSIUM SERPL-SCNC: 4.3 MMOL/L — SIGNIFICANT CHANGE UP (ref 3.5–5)
PROT SERPL-MCNC: 6.7 G/DL — SIGNIFICANT CHANGE UP (ref 6–8)
RBC # BLD: 4.44 M/UL — SIGNIFICANT CHANGE UP (ref 4.2–5.4)
RBC # FLD: 14.9 % — HIGH (ref 11.5–14.5)
SODIUM SERPL-SCNC: 143 MMOL/L — SIGNIFICANT CHANGE UP (ref 135–146)
WBC # BLD: 10.36 K/UL — SIGNIFICANT CHANGE UP (ref 4.8–10.8)
WBC # FLD AUTO: 10.36 K/UL — SIGNIFICANT CHANGE UP (ref 4.8–10.8)

## 2019-04-14 PROCEDURE — 71046 X-RAY EXAM CHEST 2 VIEWS: CPT | Mod: 26

## 2019-04-14 RX ORDER — IPRATROPIUM/ALBUTEROL SULFATE 18-103MCG
3 AEROSOL WITH ADAPTER (GRAM) INHALATION EVERY 6 HOURS
Qty: 0 | Refills: 0 | Status: DISCONTINUED | OUTPATIENT
Start: 2019-04-14 | End: 2019-04-18

## 2019-04-14 RX ORDER — FUROSEMIDE 40 MG
20 TABLET ORAL DAILY
Qty: 0 | Refills: 0 | Status: DISCONTINUED | OUTPATIENT
Start: 2019-04-14 | End: 2019-04-18

## 2019-04-14 RX ADMIN — GABAPENTIN 300 MILLIGRAM(S): 400 CAPSULE ORAL at 05:32

## 2019-04-14 RX ADMIN — Medication 3 MILLILITER(S): at 13:08

## 2019-04-14 RX ADMIN — BUDESONIDE AND FORMOTEROL FUMARATE DIHYDRATE 2 PUFF(S): 160; 4.5 AEROSOL RESPIRATORY (INHALATION) at 21:41

## 2019-04-14 RX ADMIN — Medication 0.5 MILLIGRAM(S): at 18:32

## 2019-04-14 RX ADMIN — Medication 60 MILLIGRAM(S): at 05:32

## 2019-04-14 RX ADMIN — ATORVASTATIN CALCIUM 80 MILLIGRAM(S): 80 TABLET, FILM COATED ORAL at 21:41

## 2019-04-14 RX ADMIN — Medication 1 APPLICATION(S): at 05:37

## 2019-04-14 RX ADMIN — Medication 100 MILLIGRAM(S): at 22:47

## 2019-04-14 RX ADMIN — Medication 40 MILLIGRAM(S): at 05:32

## 2019-04-14 RX ADMIN — Medication 20 MILLIGRAM(S): at 18:25

## 2019-04-14 RX ADMIN — PANTOPRAZOLE SODIUM 40 MILLIGRAM(S): 20 TABLET, DELAYED RELEASE ORAL at 05:32

## 2019-04-14 RX ADMIN — ENOXAPARIN SODIUM 40 MILLIGRAM(S): 100 INJECTION SUBCUTANEOUS at 12:49

## 2019-04-14 RX ADMIN — GABAPENTIN 300 MILLIGRAM(S): 400 CAPSULE ORAL at 13:11

## 2019-04-14 RX ADMIN — CITALOPRAM 40 MILLIGRAM(S): 10 TABLET, FILM COATED ORAL at 18:24

## 2019-04-14 RX ADMIN — Medication 40 MILLIGRAM(S): at 11:45

## 2019-04-14 RX ADMIN — GABAPENTIN 300 MILLIGRAM(S): 400 CAPSULE ORAL at 21:41

## 2019-04-14 RX ADMIN — Medication 0.5 MILLIGRAM(S): at 23:49

## 2019-04-14 RX ADMIN — Medication 75 MILLIGRAM(S): at 05:32

## 2019-04-14 RX ADMIN — Medication 81 MILLIGRAM(S): at 11:46

## 2019-04-14 RX ADMIN — Medication 75 MILLIGRAM(S): at 18:25

## 2019-04-14 RX ADMIN — BUDESONIDE AND FORMOTEROL FUMARATE DIHYDRATE 2 PUFF(S): 160; 4.5 AEROSOL RESPIRATORY (INHALATION) at 07:33

## 2019-04-14 RX ADMIN — Medication 1 APPLICATION(S): at 18:25

## 2019-04-14 RX ADMIN — Medication 0.5 MILLIGRAM(S): at 05:36

## 2019-04-14 RX ADMIN — Medication 100 MILLIGRAM(S): at 05:33

## 2019-04-14 NOTE — SWALLOW BEDSIDE ASSESSMENT ADULT - SLP GENERAL OBSERVATIONS
Pt seen awake in bed. Baseline hoarse vocal quality cleared with spontaneous throat clear and cough. Seen with lunch meal.

## 2019-04-14 NOTE — SWALLOW BEDSIDE ASSESSMENT ADULT - COMMENTS
SLP noted pt with hoarse vocal quality at baseline cleared with independent throat clear and cough. Pt reports this is baseline related to dx COPD and emphysema. Pt reports tongue swelling has gone down and feels normal now. "Scratchy throat" persists. denies pain with swallowing.

## 2019-04-14 NOTE — SWALLOW BEDSIDE ASSESSMENT ADULT - ASR SWALLOW ASPIRATION MONITOR
oral hygiene/pneumonia/throat clearing/upper respiratory infection/change of breathing pattern/position upright (90Y)/cough/gurgly voice/fever

## 2019-04-14 NOTE — SWALLOW BEDSIDE ASSESSMENT ADULT - H & P REVIEW
75 y/o female admitted (4/13/19) with SOB. Had allergic reaction to cleaning supplies in the home. CXR remarkable for bibasilar opacities and prominent interstitial opacities; possible CHF./yes

## 2019-04-14 NOTE — SWALLOW BEDSIDE ASSESSMENT ADULT - ORAL PHASE
Within functional limits slow but functional bolus manipulation likely due to dentition status./Within functional limits

## 2019-04-15 LAB
ALBUMIN SERPL ELPH-MCNC: 3.6 G/DL — SIGNIFICANT CHANGE UP (ref 3.5–5.2)
ALP SERPL-CCNC: 93 U/L — SIGNIFICANT CHANGE UP (ref 30–115)
ALT FLD-CCNC: 17 U/L — SIGNIFICANT CHANGE UP (ref 0–41)
ANION GAP SERPL CALC-SCNC: 13 MMOL/L — SIGNIFICANT CHANGE UP (ref 7–14)
AST SERPL-CCNC: 19 U/L — SIGNIFICANT CHANGE UP (ref 0–41)
BASOPHILS # BLD AUTO: 0.01 K/UL — SIGNIFICANT CHANGE UP (ref 0–0.2)
BASOPHILS NFR BLD AUTO: 0.1 % — SIGNIFICANT CHANGE UP (ref 0–1)
BILIRUB SERPL-MCNC: <0.2 MG/DL — SIGNIFICANT CHANGE UP (ref 0.2–1.2)
BUN SERPL-MCNC: 44 MG/DL — HIGH (ref 10–20)
CALCIUM SERPL-MCNC: 9.1 MG/DL — SIGNIFICANT CHANGE UP (ref 8.5–10.1)
CHLORIDE SERPL-SCNC: 103 MMOL/L — SIGNIFICANT CHANGE UP (ref 98–110)
CO2 SERPL-SCNC: 28 MMOL/L — SIGNIFICANT CHANGE UP (ref 17–32)
CREAT SERPL-MCNC: 1.2 MG/DL — SIGNIFICANT CHANGE UP (ref 0.7–1.5)
EOSINOPHIL # BLD AUTO: 0 K/UL — SIGNIFICANT CHANGE UP (ref 0–0.7)
EOSINOPHIL NFR BLD AUTO: 0 % — SIGNIFICANT CHANGE UP (ref 0–8)
GLUCOSE SERPL-MCNC: 82 MG/DL — SIGNIFICANT CHANGE UP (ref 70–99)
HCT VFR BLD CALC: 39 % — SIGNIFICANT CHANGE UP (ref 37–47)
HGB BLD-MCNC: 12.4 G/DL — SIGNIFICANT CHANGE UP (ref 12–16)
IMM GRANULOCYTES NFR BLD AUTO: 0.3 % — SIGNIFICANT CHANGE UP (ref 0.1–0.3)
LYMPHOCYTES # BLD AUTO: 1.81 K/UL — SIGNIFICANT CHANGE UP (ref 1.2–3.4)
LYMPHOCYTES # BLD AUTO: 14.6 % — LOW (ref 20.5–51.1)
MAGNESIUM SERPL-MCNC: 2 MG/DL — SIGNIFICANT CHANGE UP (ref 1.8–2.4)
MCHC RBC-ENTMCNC: 29.5 PG — SIGNIFICANT CHANGE UP (ref 27–31)
MCHC RBC-ENTMCNC: 31.8 G/DL — LOW (ref 32–37)
MCV RBC AUTO: 92.9 FL — SIGNIFICANT CHANGE UP (ref 81–99)
MONOCYTES # BLD AUTO: 1.19 K/UL — HIGH (ref 0.1–0.6)
MONOCYTES NFR BLD AUTO: 9.6 % — HIGH (ref 1.7–9.3)
NEUTROPHILS # BLD AUTO: 9.34 K/UL — HIGH (ref 1.4–6.5)
NEUTROPHILS NFR BLD AUTO: 75.4 % — HIGH (ref 42.2–75.2)
NRBC # BLD: 0 /100 WBCS — SIGNIFICANT CHANGE UP (ref 0–0)
PLATELET # BLD AUTO: 229 K/UL — SIGNIFICANT CHANGE UP (ref 130–400)
POTASSIUM SERPL-MCNC: 3.6 MMOL/L — SIGNIFICANT CHANGE UP (ref 3.5–5)
POTASSIUM SERPL-SCNC: 3.6 MMOL/L — SIGNIFICANT CHANGE UP (ref 3.5–5)
PROT SERPL-MCNC: 5.7 G/DL — LOW (ref 6–8)
RBC # BLD: 4.2 M/UL — SIGNIFICANT CHANGE UP (ref 4.2–5.4)
RBC # FLD: 15 % — HIGH (ref 11.5–14.5)
SODIUM SERPL-SCNC: 144 MMOL/L — SIGNIFICANT CHANGE UP (ref 135–146)
WBC # BLD: 12.39 K/UL — HIGH (ref 4.8–10.8)
WBC # FLD AUTO: 12.39 K/UL — HIGH (ref 4.8–10.8)

## 2019-04-15 PROCEDURE — 93970 EXTREMITY STUDY: CPT | Mod: 26

## 2019-04-15 RX ORDER — SODIUM CHLORIDE 9 MG/ML
250 INJECTION INTRAMUSCULAR; INTRAVENOUS; SUBCUTANEOUS ONCE
Qty: 0 | Refills: 0 | Status: COMPLETED | OUTPATIENT
Start: 2019-04-15 | End: 2019-04-15

## 2019-04-15 RX ORDER — SODIUM CHLORIDE 9 MG/ML
500 INJECTION INTRAMUSCULAR; INTRAVENOUS; SUBCUTANEOUS ONCE
Qty: 0 | Refills: 0 | Status: COMPLETED | OUTPATIENT
Start: 2019-04-15 | End: 2019-04-15

## 2019-04-15 RX ADMIN — SODIUM CHLORIDE 250 MILLILITER(S): 9 INJECTION INTRAMUSCULAR; INTRAVENOUS; SUBCUTANEOUS at 20:13

## 2019-04-15 RX ADMIN — CITALOPRAM 40 MILLIGRAM(S): 10 TABLET, FILM COATED ORAL at 11:28

## 2019-04-15 RX ADMIN — GABAPENTIN 300 MILLIGRAM(S): 400 CAPSULE ORAL at 21:47

## 2019-04-15 RX ADMIN — GABAPENTIN 300 MILLIGRAM(S): 400 CAPSULE ORAL at 06:29

## 2019-04-15 RX ADMIN — Medication 100 MILLIGRAM(S): at 06:31

## 2019-04-15 RX ADMIN — Medication 1 APPLICATION(S): at 17:24

## 2019-04-15 RX ADMIN — Medication 0.5 MILLIGRAM(S): at 21:48

## 2019-04-15 RX ADMIN — SODIUM CHLORIDE 500 MILLILITER(S): 9 INJECTION INTRAMUSCULAR; INTRAVENOUS; SUBCUTANEOUS at 22:28

## 2019-04-15 RX ADMIN — BUDESONIDE AND FORMOTEROL FUMARATE DIHYDRATE 2 PUFF(S): 160; 4.5 AEROSOL RESPIRATORY (INHALATION) at 07:44

## 2019-04-15 RX ADMIN — Medication 75 MILLIGRAM(S): at 17:24

## 2019-04-15 RX ADMIN — ATORVASTATIN CALCIUM 80 MILLIGRAM(S): 80 TABLET, FILM COATED ORAL at 21:47

## 2019-04-15 RX ADMIN — PANTOPRAZOLE SODIUM 40 MILLIGRAM(S): 20 TABLET, DELAYED RELEASE ORAL at 06:29

## 2019-04-15 RX ADMIN — Medication 20 MILLIGRAM(S): at 06:30

## 2019-04-15 RX ADMIN — BUDESONIDE AND FORMOTEROL FUMARATE DIHYDRATE 2 PUFF(S): 160; 4.5 AEROSOL RESPIRATORY (INHALATION) at 20:07

## 2019-04-15 RX ADMIN — Medication 81 MILLIGRAM(S): at 11:28

## 2019-04-15 RX ADMIN — Medication 0.5 MILLIGRAM(S): at 06:36

## 2019-04-15 RX ADMIN — Medication 75 MILLIGRAM(S): at 06:29

## 2019-04-15 RX ADMIN — Medication 1 APPLICATION(S): at 06:30

## 2019-04-15 RX ADMIN — Medication 3 MILLILITER(S): at 20:21

## 2019-04-15 RX ADMIN — ENOXAPARIN SODIUM 40 MILLIGRAM(S): 100 INJECTION SUBCUTANEOUS at 11:28

## 2019-04-15 RX ADMIN — GABAPENTIN 300 MILLIGRAM(S): 400 CAPSULE ORAL at 14:29

## 2019-04-15 RX ADMIN — Medication 40 MILLIGRAM(S): at 06:29

## 2019-04-15 NOTE — PHYSICAL THERAPY INITIAL EVALUATION ADULT - GAIT DISTANCE, PT EVAL
3 ' patient c/o dizziness , ligtheaded and became increasingly lethargic even after sittin,. pox 95% , unable to fetermine BP still palved in supine 110/58 RN and DR Dmitri olson , RN Shaq with patient

## 2019-04-15 NOTE — CHART NOTE - NSCHARTNOTEFT_GEN_A_CORE
patient complained of dizziness, asking for a neb treatment already ordered. I looked at lab results, will order bolus too

## 2019-04-16 LAB
ANION GAP SERPL CALC-SCNC: 13 MMOL/L — SIGNIFICANT CHANGE UP (ref 7–14)
BUN SERPL-MCNC: 41 MG/DL — HIGH (ref 10–20)
CALCIUM SERPL-MCNC: 8.8 MG/DL — SIGNIFICANT CHANGE UP (ref 8.5–10.1)
CHLORIDE SERPL-SCNC: 104 MMOL/L — SIGNIFICANT CHANGE UP (ref 98–110)
CO2 SERPL-SCNC: 26 MMOL/L — SIGNIFICANT CHANGE UP (ref 17–32)
CREAT SERPL-MCNC: 1.2 MG/DL — SIGNIFICANT CHANGE UP (ref 0.7–1.5)
GLUCOSE SERPL-MCNC: 77 MG/DL — SIGNIFICANT CHANGE UP (ref 70–99)
HCT VFR BLD CALC: 40.3 % — SIGNIFICANT CHANGE UP (ref 37–47)
HGB BLD-MCNC: 12.7 G/DL — SIGNIFICANT CHANGE UP (ref 12–16)
MCHC RBC-ENTMCNC: 29 PG — SIGNIFICANT CHANGE UP (ref 27–31)
MCHC RBC-ENTMCNC: 31.5 G/DL — LOW (ref 32–37)
MCV RBC AUTO: 92 FL — SIGNIFICANT CHANGE UP (ref 81–99)
NRBC # BLD: 0 /100 WBCS — SIGNIFICANT CHANGE UP (ref 0–0)
PLATELET # BLD AUTO: 239 K/UL — SIGNIFICANT CHANGE UP (ref 130–400)
POTASSIUM SERPL-MCNC: 3.9 MMOL/L — SIGNIFICANT CHANGE UP (ref 3.5–5)
POTASSIUM SERPL-SCNC: 3.9 MMOL/L — SIGNIFICANT CHANGE UP (ref 3.5–5)
RBC # BLD: 4.38 M/UL — SIGNIFICANT CHANGE UP (ref 4.2–5.4)
RBC # FLD: 14.8 % — HIGH (ref 11.5–14.5)
SODIUM SERPL-SCNC: 143 MMOL/L — SIGNIFICANT CHANGE UP (ref 135–146)
WBC # BLD: 10.64 K/UL — SIGNIFICANT CHANGE UP (ref 4.8–10.8)
WBC # FLD AUTO: 10.64 K/UL — SIGNIFICANT CHANGE UP (ref 4.8–10.8)

## 2019-04-16 RX ORDER — SODIUM CHLORIDE 9 MG/ML
500 INJECTION INTRAMUSCULAR; INTRAVENOUS; SUBCUTANEOUS ONCE
Qty: 0 | Refills: 0 | Status: COMPLETED | OUTPATIENT
Start: 2019-04-16 | End: 2019-04-16

## 2019-04-16 RX ADMIN — Medication 0.5 MILLIGRAM(S): at 13:48

## 2019-04-16 RX ADMIN — SODIUM CHLORIDE 500 MILLILITER(S): 9 INJECTION INTRAMUSCULAR; INTRAVENOUS; SUBCUTANEOUS at 06:42

## 2019-04-16 RX ADMIN — GABAPENTIN 300 MILLIGRAM(S): 400 CAPSULE ORAL at 06:48

## 2019-04-16 RX ADMIN — BUDESONIDE AND FORMOTEROL FUMARATE DIHYDRATE 2 PUFF(S): 160; 4.5 AEROSOL RESPIRATORY (INHALATION) at 07:38

## 2019-04-16 RX ADMIN — Medication 0.5 MILLIGRAM(S): at 21:09

## 2019-04-16 RX ADMIN — GABAPENTIN 300 MILLIGRAM(S): 400 CAPSULE ORAL at 13:48

## 2019-04-16 RX ADMIN — Medication 1 APPLICATION(S): at 18:38

## 2019-04-16 RX ADMIN — ENOXAPARIN SODIUM 40 MILLIGRAM(S): 100 INJECTION SUBCUTANEOUS at 11:06

## 2019-04-16 RX ADMIN — GABAPENTIN 300 MILLIGRAM(S): 400 CAPSULE ORAL at 21:09

## 2019-04-16 RX ADMIN — PANTOPRAZOLE SODIUM 40 MILLIGRAM(S): 20 TABLET, DELAYED RELEASE ORAL at 06:48

## 2019-04-16 RX ADMIN — Medication 81 MILLIGRAM(S): at 11:05

## 2019-04-16 RX ADMIN — CITALOPRAM 40 MILLIGRAM(S): 10 TABLET, FILM COATED ORAL at 11:05

## 2019-04-16 RX ADMIN — Medication 40 MILLIGRAM(S): at 06:48

## 2019-04-16 RX ADMIN — BUDESONIDE AND FORMOTEROL FUMARATE DIHYDRATE 2 PUFF(S): 160; 4.5 AEROSOL RESPIRATORY (INHALATION) at 19:32

## 2019-04-16 RX ADMIN — ATORVASTATIN CALCIUM 80 MILLIGRAM(S): 80 TABLET, FILM COATED ORAL at 21:09

## 2019-04-16 RX ADMIN — Medication 3 MILLILITER(S): at 14:04

## 2019-04-16 RX ADMIN — Medication 1 APPLICATION(S): at 06:58

## 2019-04-17 LAB
ANION GAP SERPL CALC-SCNC: 12 MMOL/L — SIGNIFICANT CHANGE UP (ref 7–14)
BUN SERPL-MCNC: 33 MG/DL — HIGH (ref 10–20)
CALCIUM SERPL-MCNC: 8.8 MG/DL — SIGNIFICANT CHANGE UP (ref 8.5–10.1)
CHLORIDE SERPL-SCNC: 103 MMOL/L — SIGNIFICANT CHANGE UP (ref 98–110)
CO2 SERPL-SCNC: 24 MMOL/L — SIGNIFICANT CHANGE UP (ref 17–32)
CREAT SERPL-MCNC: 1 MG/DL — SIGNIFICANT CHANGE UP (ref 0.7–1.5)
GLUCOSE SERPL-MCNC: 76 MG/DL — SIGNIFICANT CHANGE UP (ref 70–99)
HCT VFR BLD CALC: 37.1 % — SIGNIFICANT CHANGE UP (ref 37–47)
HGB BLD-MCNC: 11.6 G/DL — LOW (ref 12–16)
MCHC RBC-ENTMCNC: 28.9 PG — SIGNIFICANT CHANGE UP (ref 27–31)
MCHC RBC-ENTMCNC: 31.3 G/DL — LOW (ref 32–37)
MCV RBC AUTO: 92.5 FL — SIGNIFICANT CHANGE UP (ref 81–99)
NRBC # BLD: 0 /100 WBCS — SIGNIFICANT CHANGE UP (ref 0–0)
PLATELET # BLD AUTO: 190 K/UL — SIGNIFICANT CHANGE UP (ref 130–400)
POTASSIUM SERPL-MCNC: 4.3 MMOL/L — SIGNIFICANT CHANGE UP (ref 3.5–5)
POTASSIUM SERPL-SCNC: 4.3 MMOL/L — SIGNIFICANT CHANGE UP (ref 3.5–5)
RBC # BLD: 4.01 M/UL — LOW (ref 4.2–5.4)
RBC # FLD: 15 % — HIGH (ref 11.5–14.5)
SODIUM SERPL-SCNC: 139 MMOL/L — SIGNIFICANT CHANGE UP (ref 135–146)
WBC # BLD: 9.44 K/UL — SIGNIFICANT CHANGE UP (ref 4.8–10.8)
WBC # FLD AUTO: 9.44 K/UL — SIGNIFICANT CHANGE UP (ref 4.8–10.8)

## 2019-04-17 RX ADMIN — GABAPENTIN 300 MILLIGRAM(S): 400 CAPSULE ORAL at 22:24

## 2019-04-17 RX ADMIN — ENOXAPARIN SODIUM 40 MILLIGRAM(S): 100 INJECTION SUBCUTANEOUS at 11:09

## 2019-04-17 RX ADMIN — Medication 0.5 MILLIGRAM(S): at 22:24

## 2019-04-17 RX ADMIN — GABAPENTIN 300 MILLIGRAM(S): 400 CAPSULE ORAL at 05:27

## 2019-04-17 RX ADMIN — Medication 81 MILLIGRAM(S): at 11:09

## 2019-04-17 RX ADMIN — CITALOPRAM 40 MILLIGRAM(S): 10 TABLET, FILM COATED ORAL at 11:09

## 2019-04-17 RX ADMIN — Medication 40 MILLIGRAM(S): at 05:26

## 2019-04-17 RX ADMIN — Medication 75 MILLIGRAM(S): at 17:11

## 2019-04-17 RX ADMIN — Medication 20 MILLIGRAM(S): at 05:27

## 2019-04-17 RX ADMIN — GABAPENTIN 300 MILLIGRAM(S): 400 CAPSULE ORAL at 13:27

## 2019-04-17 RX ADMIN — PANTOPRAZOLE SODIUM 40 MILLIGRAM(S): 20 TABLET, DELAYED RELEASE ORAL at 05:26

## 2019-04-17 RX ADMIN — Medication 1 APPLICATION(S): at 05:27

## 2019-04-17 RX ADMIN — Medication 75 MILLIGRAM(S): at 05:27

## 2019-04-17 RX ADMIN — ATORVASTATIN CALCIUM 80 MILLIGRAM(S): 80 TABLET, FILM COATED ORAL at 22:24

## 2019-04-17 RX ADMIN — BUDESONIDE AND FORMOTEROL FUMARATE DIHYDRATE 2 PUFF(S): 160; 4.5 AEROSOL RESPIRATORY (INHALATION) at 07:50

## 2019-04-17 RX ADMIN — Medication 1 APPLICATION(S): at 17:12

## 2019-04-17 RX ADMIN — BUDESONIDE AND FORMOTEROL FUMARATE DIHYDRATE 2 PUFF(S): 160; 4.5 AEROSOL RESPIRATORY (INHALATION) at 22:23

## 2019-04-18 ENCOUNTER — TRANSCRIPTION ENCOUNTER (OUTPATIENT)
Age: 74
End: 2019-04-18

## 2019-04-18 VITALS
RESPIRATION RATE: 16 BRPM | SYSTOLIC BLOOD PRESSURE: 116 MMHG | HEART RATE: 77 BPM | DIASTOLIC BLOOD PRESSURE: 57 MMHG | TEMPERATURE: 96 F

## 2019-04-18 RX ORDER — FUROSEMIDE 40 MG
1 TABLET ORAL
Qty: 30 | Refills: 0
Start: 2019-04-18 | End: 2019-05-17

## 2019-04-18 RX ORDER — BUDESONIDE AND FORMOTEROL FUMARATE DIHYDRATE 160; 4.5 UG/1; UG/1
1 AEROSOL RESPIRATORY (INHALATION)
Qty: 1 | Refills: 0
Start: 2019-04-18 | End: 2019-05-17

## 2019-04-18 RX ORDER — METOPROLOL TARTRATE 50 MG
1 TABLET ORAL
Qty: 60 | Refills: 0
Start: 2019-04-18 | End: 2019-05-17

## 2019-04-18 RX ORDER — ALBUTEROL 90 UG/1
1 AEROSOL, METERED ORAL
Qty: 1 | Refills: 0
Start: 2019-04-18 | End: 2019-05-17

## 2019-04-18 RX ORDER — LOSARTAN/HYDROCHLOROTHIAZIDE 100MG-25MG
1 TABLET ORAL
Qty: 0 | Refills: 0 | COMMUNITY

## 2019-04-18 RX ORDER — TIOTROPIUM BROMIDE 18 UG/1
1 CAPSULE ORAL; RESPIRATORY (INHALATION)
Qty: 30 | Refills: 0
Start: 2019-04-18 | End: 2019-05-17

## 2019-04-18 RX ADMIN — ENOXAPARIN SODIUM 40 MILLIGRAM(S): 100 INJECTION SUBCUTANEOUS at 11:06

## 2019-04-18 RX ADMIN — Medication 0.5 MILLIGRAM(S): at 05:46

## 2019-04-18 RX ADMIN — Medication 81 MILLIGRAM(S): at 11:06

## 2019-04-18 RX ADMIN — PANTOPRAZOLE SODIUM 40 MILLIGRAM(S): 20 TABLET, DELAYED RELEASE ORAL at 05:46

## 2019-04-18 RX ADMIN — GABAPENTIN 300 MILLIGRAM(S): 400 CAPSULE ORAL at 05:46

## 2019-04-18 RX ADMIN — BUDESONIDE AND FORMOTEROL FUMARATE DIHYDRATE 2 PUFF(S): 160; 4.5 AEROSOL RESPIRATORY (INHALATION) at 07:36

## 2019-04-18 RX ADMIN — Medication 3 MILLILITER(S): at 12:51

## 2019-04-18 RX ADMIN — GABAPENTIN 300 MILLIGRAM(S): 400 CAPSULE ORAL at 13:32

## 2019-04-18 RX ADMIN — Medication 40 MILLIGRAM(S): at 05:46

## 2019-04-18 RX ADMIN — CITALOPRAM 40 MILLIGRAM(S): 10 TABLET, FILM COATED ORAL at 11:06

## 2019-04-18 RX ADMIN — Medication 1 APPLICATION(S): at 05:46

## 2019-04-18 RX ADMIN — Medication 20 MILLIGRAM(S): at 05:46

## 2019-04-18 NOTE — DISCHARGE NOTE PROVIDER - PROVIDER TOKENS
PROVIDER:[TOKEN:[[id number]]] PROVIDER:[TOKEN:[[id number]]],PROVIDER:[TOKEN:[[id number]]]

## 2019-04-18 NOTE — PROGRESS NOTE ADULT - ASSESSMENT
Acute respiratory failure resolved  copd exacerbation   component of angioedema  LLL infiltrate improving    Suggest:  event last PM may have been a bit of an aspiration  now feeling better  D/C planning  her home must be free from fumes from floors prior to going into home  Upon D/C the patient will need ICS/LABA, PRN albuterol, finish abx, slow taper of steroids ie. start Prednisone 40 mg and taper 10 mg Q4days.  Pt to see Dr. Chavez in the office in approx. 2 weeks        WILL FOLLOW
74F with COPD, Depression and TIA presents from home for sudden onset shortness of breath.    ASSESSMENT:  Principal Diagnosis:  Acute Hypoxic Respiratory failure secondary to Chronic obstructive pulmonary disease exacerbation with concomitant angioedema secondary to aerosol toxin (polyurothane as per patient )-resolved    Associated Active Comorbid Conditions:  Depression   Hypertension   cardiomyopathy   emphysema   peripheral vascular disease    1) COPD;  -on oral steroids and symbicort  -nebs prn     2) HTN  -home meds    3) obesity   -BMI > 33  -weight loss and diet modification     4) suspected CKD stage III  -outpatient nephrology follow up and kidney monitoring     5) Debility  -rehab/pt       # Progress Note Handoff  PENDING as follows  consults: pulm reviewed   Test: ;abs reviewed   Other:  Family discussion: discussed with patient at bedside  Disposition:  Home _with home care tomorrow___ or SNF ____ Other _____ Unknown at this time ____
74F with COPD, Depression and TIA presents from home for sudden onset shortness of breath.    ASSESSMENT:  Principal Diagnosis:  Acute Hypoxic Respiratory failure secondary to Chronic obstructive pulmonary disease exacerbation with concomitant angioedema secondary to aerosol toxin (polyurothane as per patient )-resolved    Associated Active Comorbid Conditions:  Depression   Hypertension   cardiomyopathy   emphysema   peripheral vascular disease    1) COPD;  -stable  -on oral steroids and symbicort  -nebs prn     2) HTN  -home meds    3) obesity   -BMI > 33  -weight loss and diet modification     4) suspected CKD stage III  -outpatient nephrology follow up and kidney monitoring     5) Debility  -rehab/pt       # Progress Note Handoff  PENDING as follows  consults: pulm reviewed   Test: labs reviewed   Other:  Family discussion: discussed with patient at bedside  Disposition:  Home _with home care today___ or SNF ____ Other _____ Unknown at this time ____

## 2019-04-18 NOTE — PROGRESS NOTE ADULT - SUBJECTIVE AND OBJECTIVE BOX
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  furosemide    Tablet 20 milliGRAM(s) Oral daily  gabapentin 300 milliGRAM(s) Oral three times a day  metoprolol tartrate 75 milliGRAM(s) Oral two times a day  pantoprazole    Tablet 40 milliGRAM(s) Oral before breakfast  predniSONE   Tablet 40 milliGRAM(s) Oral daily  silver sulfADIAZINE 1% Cream 1 Application(s) Topical every 12 hours  tiotropium 18 MICROgram(s) Capsule 1 Capsule(s) Inhalation daily    MEDICATIONS  (PRN):  acetaminophen   Tablet .. 650 milliGRAM(s) Oral every 6 hours PRN Mild Pain (1 - 3)  ALBUTerol/ipratropium for Nebulization 3 milliLiter(s) Nebulizer every 6 hours PRN Shortness of Breath and/or Wheezing  guaiFENesin    Syrup 100 milliGRAM(s) Oral every 6 hours PRN Cough  LORazepam     Tablet 0.5 milliGRAM(s) Oral two times a day PRN Anxiety

## 2019-04-18 NOTE — DISCHARGE NOTE PROVIDER - HOSPITAL COURSE
In ED, 74F with COPD, Depression and TIA presnets from home for sudden onset shortness of breath. Patient went to [employer] and smelled cleaning supplies/chemical and throat began closing, and felt scratchy and came to ED/ While in ED patient was give Duonebs and racemic epi with improvement in respiratory status. Was sent to MICU for monitoring overnight. Patient improved and asymptomatic now             during this hospitalization patient did well, had follow ups from pulmonary team, cleared for home discharge    -pt requested to be discharged on Thursday as the house would be clean of all the dust/debris    -on the day of discharge patient is sitting up in chair, doing well and is ready to be discharged    -medications sent to [facility] on [address]    -home care visits arranged    -patient will follow up with Dr. Chavez/pulmonologist in 1 week

## 2019-04-18 NOTE — DISCHARGE NOTE PROVIDER - NSDCCPCAREPLAN_GEN_ALL_CORE_FT
PRINCIPAL DISCHARGE DIAGNOSIS  Diagnosis: Allergic reaction  Assessment and Plan of Treatment: resolved and patient is doing well      SECONDARY DISCHARGE DIAGNOSES  Diagnosis: COPD exacerbation  Assessment and Plan of Treatment: symptoms improved; take your medications as prescribed and follow up with Dr. Chavez    Diagnosis: Angioedema  Assessment and Plan of Treatment: resolved

## 2019-04-18 NOTE — DISCHARGE NOTE NURSING/CASE MANAGEMENT/SOCIAL WORK - NSDCDPATPORTLINK_GEN_ALL_CORE
You can access the [facility] Patient Portal, offered by [facility], by registering with the following website: [email or web address]

## 2019-04-18 NOTE — DISCHARGE NOTE PROVIDER - CARE PROVIDER_API CALL
Hunter Chavez)  Internal Medicine; Pulmonary Disease  [address] [place] [address], [place] [address]  Phone: [phone]  Fax: [phone]  Follow Up Time: Hunter Chavez)  Internal Medicine; Pulmonary Disease  [address] [facility], [address]  [place] [address]  Phone: [phone]  Fax: [phone]  Follow Up Time:     Carl Bishop)  Cardiovascular Disease; Internal Medicine  [address] [place] [address]  Phone: [phone]  Fax: [phone]  Follow Up Time:

## 2019-04-18 NOTE — DISCHARGE NOTE PROVIDER - CARE PROVIDERS DIRECT ADDRESSES
,DirectAddress_Unknown ,DirectAddress_Unknown,[email or web address]

## 2019-04-22 DIAGNOSIS — J96.01 ACUTE RESPIRATORY FAILURE WITH HYPOXIA: ICD-10-CM

## 2019-04-22 DIAGNOSIS — F32.9 MAJOR DEPRESSIVE DISORDER, SINGLE EPISODE, UNSPECIFIED: ICD-10-CM

## 2019-04-22 DIAGNOSIS — Z88.5 ALLERGY STATUS TO NARCOTIC AGENT: ICD-10-CM

## 2019-04-22 DIAGNOSIS — I50.9 HEART FAILURE, UNSPECIFIED: ICD-10-CM

## 2019-04-22 DIAGNOSIS — Z79.899 OTHER LONG TERM (CURRENT) DRUG THERAPY: ICD-10-CM

## 2019-04-22 DIAGNOSIS — N18.3 CHRONIC KIDNEY DISEASE, STAGE 3 (MODERATE): ICD-10-CM

## 2019-04-22 DIAGNOSIS — I13.0 HYPERTENSIVE HEART AND CHRONIC KIDNEY DISEASE WITH HEART FAILURE AND STAGE 1 THROUGH STAGE 4 CHRONIC KIDNEY DISEASE, OR UNSPECIFIED CHRONIC KIDNEY DISEASE: ICD-10-CM

## 2019-04-22 DIAGNOSIS — T78.3XXA ANGIONEUROTIC EDEMA, INITIAL ENCOUNTER: ICD-10-CM

## 2019-04-22 DIAGNOSIS — E66.9 OBESITY, UNSPECIFIED: ICD-10-CM

## 2019-04-22 DIAGNOSIS — I42.8 OTHER CARDIOMYOPATHIES: ICD-10-CM

## 2019-04-22 DIAGNOSIS — Z86.73 PERSONAL HISTORY OF TRANSIENT ISCHEMIC ATTACK (TIA), AND CEREBRAL INFARCTION WITHOUT RESIDUAL DEFICITS: ICD-10-CM

## 2019-04-22 DIAGNOSIS — Z79.51 LONG TERM (CURRENT) USE OF INHALED STEROIDS: ICD-10-CM

## 2019-04-22 DIAGNOSIS — J43.8 OTHER EMPHYSEMA: ICD-10-CM

## 2019-04-22 DIAGNOSIS — I73.9 PERIPHERAL VASCULAR DISEASE, UNSPECIFIED: ICD-10-CM

## 2019-04-22 DIAGNOSIS — Z88.8 ALLERGY STATUS TO OTHER DRUGS, MEDICAMENTS AND BIOLOGICAL SUBSTANCES: ICD-10-CM

## 2019-04-22 DIAGNOSIS — Z79.82 LONG TERM (CURRENT) USE OF ASPIRIN: ICD-10-CM

## 2019-05-02 ENCOUNTER — OUTPATIENT (OUTPATIENT)
Dept: OUTPATIENT SERVICES | Facility: HOSPITAL | Age: 74
LOS: 1 days | Discharge: HOME | End: 2019-05-02
Payer: MEDICARE

## 2019-05-02 DIAGNOSIS — R05 COUGH: ICD-10-CM

## 2019-05-02 DIAGNOSIS — Z90.49 ACQUIRED ABSENCE OF OTHER SPECIFIED PARTS OF DIGESTIVE TRACT: Chronic | ICD-10-CM

## 2019-05-02 PROCEDURE — 71046 X-RAY EXAM CHEST 2 VIEWS: CPT | Mod: 26

## 2019-05-21 ENCOUNTER — INPATIENT (INPATIENT)
Facility: HOSPITAL | Age: 74
LOS: 6 days | Discharge: HOME | End: 2019-05-28
Attending: INTERNAL MEDICINE | Admitting: INTERNAL MEDICINE
Payer: MEDICARE

## 2019-05-21 VITALS
SYSTOLIC BLOOD PRESSURE: 128 MMHG | HEIGHT: 62 IN | HEART RATE: 77 BPM | TEMPERATURE: 98 F | WEIGHT: 190.04 LBS | RESPIRATION RATE: 19 BRPM | DIASTOLIC BLOOD PRESSURE: 73 MMHG | OXYGEN SATURATION: 93 %

## 2019-05-21 DIAGNOSIS — Z90.49 ACQUIRED ABSENCE OF OTHER SPECIFIED PARTS OF DIGESTIVE TRACT: Chronic | ICD-10-CM

## 2019-05-21 LAB
ANION GAP SERPL CALC-SCNC: 9 MMOL/L — SIGNIFICANT CHANGE UP (ref 7–14)
BASOPHILS # BLD AUTO: 0.04 K/UL — SIGNIFICANT CHANGE UP (ref 0–0.2)
BASOPHILS NFR BLD AUTO: 0.4 % — SIGNIFICANT CHANGE UP (ref 0–1)
BUN SERPL-MCNC: 21 MG/DL — HIGH (ref 10–20)
CALCIUM SERPL-MCNC: 9.1 MG/DL — SIGNIFICANT CHANGE UP (ref 8.5–10.1)
CHLORIDE SERPL-SCNC: 108 MMOL/L — SIGNIFICANT CHANGE UP (ref 98–110)
CO2 SERPL-SCNC: 26 MMOL/L — SIGNIFICANT CHANGE UP (ref 17–32)
CREAT SERPL-MCNC: 0.9 MG/DL — SIGNIFICANT CHANGE UP (ref 0.7–1.5)
EOSINOPHIL # BLD AUTO: 0.16 K/UL — SIGNIFICANT CHANGE UP (ref 0–0.7)
EOSINOPHIL NFR BLD AUTO: 1.6 % — SIGNIFICANT CHANGE UP (ref 0–8)
GLUCOSE SERPL-MCNC: 111 MG/DL — HIGH (ref 70–99)
HCT VFR BLD CALC: 41.1 % — SIGNIFICANT CHANGE UP (ref 37–47)
HGB BLD-MCNC: 12.9 G/DL — SIGNIFICANT CHANGE UP (ref 12–16)
IMM GRANULOCYTES NFR BLD AUTO: 0.4 % — HIGH (ref 0.1–0.3)
LYMPHOCYTES # BLD AUTO: 1.51 K/UL — SIGNIFICANT CHANGE UP (ref 1.2–3.4)
LYMPHOCYTES # BLD AUTO: 14.7 % — LOW (ref 20.5–51.1)
MCHC RBC-ENTMCNC: 28.9 PG — SIGNIFICANT CHANGE UP (ref 27–31)
MCHC RBC-ENTMCNC: 31.4 G/DL — LOW (ref 32–37)
MCV RBC AUTO: 91.9 FL — SIGNIFICANT CHANGE UP (ref 81–99)
MONOCYTES # BLD AUTO: 0.75 K/UL — HIGH (ref 0.1–0.6)
MONOCYTES NFR BLD AUTO: 7.3 % — SIGNIFICANT CHANGE UP (ref 1.7–9.3)
NEUTROPHILS # BLD AUTO: 7.78 K/UL — HIGH (ref 1.4–6.5)
NEUTROPHILS NFR BLD AUTO: 75.6 % — HIGH (ref 42.2–75.2)
NRBC # BLD: 0 /100 WBCS — SIGNIFICANT CHANGE UP (ref 0–0)
PLATELET # BLD AUTO: 250 K/UL — SIGNIFICANT CHANGE UP (ref 130–400)
POTASSIUM SERPL-MCNC: 4.1 MMOL/L — SIGNIFICANT CHANGE UP (ref 3.5–5)
POTASSIUM SERPL-SCNC: 4.1 MMOL/L — SIGNIFICANT CHANGE UP (ref 3.5–5)
RBC # BLD: 4.47 M/UL — SIGNIFICANT CHANGE UP (ref 4.2–5.4)
RBC # FLD: 15.2 % — HIGH (ref 11.5–14.5)
SODIUM SERPL-SCNC: 143 MMOL/L — SIGNIFICANT CHANGE UP (ref 135–146)
WBC # BLD: 10.28 K/UL — SIGNIFICANT CHANGE UP (ref 4.8–10.8)
WBC # FLD AUTO: 10.28 K/UL — SIGNIFICANT CHANGE UP (ref 4.8–10.8)

## 2019-05-21 PROCEDURE — 99291 CRITICAL CARE FIRST HOUR: CPT

## 2019-05-21 RX ORDER — DIPHENHYDRAMINE HCL 50 MG
50 CAPSULE ORAL ONCE
Refills: 0 | Status: COMPLETED | OUTPATIENT
Start: 2019-05-21 | End: 2019-05-21

## 2019-05-21 RX ORDER — SODIUM CHLORIDE 9 MG/ML
1000 INJECTION, SOLUTION INTRAVENOUS ONCE
Refills: 0 | Status: COMPLETED | OUTPATIENT
Start: 2019-05-21 | End: 2019-05-21

## 2019-05-21 RX ORDER — ALBUTEROL 90 UG/1
2.5 AEROSOL, METERED ORAL ONCE
Refills: 0 | Status: COMPLETED | OUTPATIENT
Start: 2019-05-21 | End: 2019-05-21

## 2019-05-21 RX ORDER — FAMOTIDINE 10 MG/ML
20 INJECTION INTRAVENOUS ONCE
Refills: 0 | Status: COMPLETED | OUTPATIENT
Start: 2019-05-21 | End: 2019-05-21

## 2019-05-21 RX ADMIN — Medication 50 MILLIGRAM(S): at 22:47

## 2019-05-21 RX ADMIN — SODIUM CHLORIDE 1000 MILLILITER(S): 9 INJECTION, SOLUTION INTRAVENOUS at 22:47

## 2019-05-21 RX ADMIN — ALBUTEROL 2.5 MILLIGRAM(S): 90 AEROSOL, METERED ORAL at 22:46

## 2019-05-21 RX ADMIN — Medication 125 MILLIGRAM(S): at 22:47

## 2019-05-21 RX ADMIN — FAMOTIDINE 20 MILLIGRAM(S): 10 INJECTION INTRAVENOUS at 22:47

## 2019-05-21 NOTE — ED ADULT TRIAGE NOTE - CHIEF COMPLAINT QUOTE
BIBA via [facility] from home, patient states that at 4pm her tongue began to swell and had difficulty swallowing that has persisted until now. Given Epi IM by [facility] PTA

## 2019-05-21 NOTE — ED ADULT NURSE REASSESSMENT NOTE - NS ED NURSE REASSESS COMMENT FT1
pt sleeping, when asked how her tongue feels, if it is less or more swollen states less, also sounds less hoarse when speaking, pt is lethargic but arouses  to voice.

## 2019-05-22 LAB
ANION GAP SERPL CALC-SCNC: 11 MMOL/L — SIGNIFICANT CHANGE UP (ref 7–14)
BUN SERPL-MCNC: 21 MG/DL — HIGH (ref 10–20)
CALCIUM SERPL-MCNC: 9.3 MG/DL — SIGNIFICANT CHANGE UP (ref 8.5–10.1)
CHLORIDE SERPL-SCNC: 110 MMOL/L — SIGNIFICANT CHANGE UP (ref 98–110)
CO2 SERPL-SCNC: 22 MMOL/L — SIGNIFICANT CHANGE UP (ref 17–32)
CREAT SERPL-MCNC: 0.8 MG/DL — SIGNIFICANT CHANGE UP (ref 0.7–1.5)
GLUCOSE SERPL-MCNC: 139 MG/DL — HIGH (ref 70–99)
HCT VFR BLD CALC: 36.7 % — LOW (ref 37–47)
HGB BLD-MCNC: 11.7 G/DL — LOW (ref 12–16)
MAGNESIUM SERPL-MCNC: 1.8 MG/DL — SIGNIFICANT CHANGE UP (ref 1.8–2.4)
MCHC RBC-ENTMCNC: 29.1 PG — SIGNIFICANT CHANGE UP (ref 27–31)
MCHC RBC-ENTMCNC: 31.9 G/DL — LOW (ref 32–37)
MCV RBC AUTO: 91.3 FL — SIGNIFICANT CHANGE UP (ref 81–99)
NRBC # BLD: 0 /100 WBCS — SIGNIFICANT CHANGE UP (ref 0–0)
PLATELET # BLD AUTO: 207 K/UL — SIGNIFICANT CHANGE UP (ref 130–400)
POTASSIUM SERPL-MCNC: 4.1 MMOL/L — SIGNIFICANT CHANGE UP (ref 3.5–5)
POTASSIUM SERPL-SCNC: 4.1 MMOL/L — SIGNIFICANT CHANGE UP (ref 3.5–5)
RBC # BLD: 4.02 M/UL — LOW (ref 4.2–5.4)
RBC # FLD: 15 % — HIGH (ref 11.5–14.5)
SODIUM SERPL-SCNC: 143 MMOL/L — SIGNIFICANT CHANGE UP (ref 135–146)
WBC # BLD: 7.19 K/UL — SIGNIFICANT CHANGE UP (ref 4.8–10.8)
WBC # FLD AUTO: 7.19 K/UL — SIGNIFICANT CHANGE UP (ref 4.8–10.8)

## 2019-05-22 RX ORDER — GABAPENTIN 400 MG/1
300 CAPSULE ORAL THREE TIMES A DAY
Refills: 0 | Status: DISCONTINUED | OUTPATIENT
Start: 2019-05-22 | End: 2019-05-22

## 2019-05-22 RX ORDER — ENOXAPARIN SODIUM 100 MG/ML
40 INJECTION SUBCUTANEOUS DAILY
Refills: 0 | Status: DISCONTINUED | OUTPATIENT
Start: 2019-05-22 | End: 2019-05-28

## 2019-05-22 RX ORDER — GABAPENTIN 400 MG/1
100 CAPSULE ORAL THREE TIMES A DAY
Refills: 0 | Status: DISCONTINUED | OUTPATIENT
Start: 2019-05-22 | End: 2019-05-23

## 2019-05-22 RX ORDER — METOPROLOL TARTRATE 50 MG
5 TABLET ORAL EVERY 6 HOURS
Refills: 0 | Status: DISCONTINUED | OUTPATIENT
Start: 2019-05-22 | End: 2019-05-22

## 2019-05-22 RX ORDER — ATORVASTATIN CALCIUM 80 MG/1
80 TABLET, FILM COATED ORAL AT BEDTIME
Refills: 0 | Status: DISCONTINUED | OUTPATIENT
Start: 2019-05-22 | End: 2019-05-28

## 2019-05-22 RX ORDER — PANTOPRAZOLE SODIUM 20 MG/1
40 TABLET, DELAYED RELEASE ORAL DAILY
Refills: 0 | Status: DISCONTINUED | OUTPATIENT
Start: 2019-05-22 | End: 2019-05-22

## 2019-05-22 RX ORDER — BUDESONIDE AND FORMOTEROL FUMARATE DIHYDRATE 160; 4.5 UG/1; UG/1
2 AEROSOL RESPIRATORY (INHALATION)
Refills: 0 | Status: DISCONTINUED | OUTPATIENT
Start: 2019-05-22 | End: 2019-05-23

## 2019-05-22 RX ORDER — DIPHENHYDRAMINE HCL 50 MG
50 CAPSULE ORAL EVERY 8 HOURS
Refills: 0 | Status: DISCONTINUED | OUTPATIENT
Start: 2019-05-22 | End: 2019-05-28

## 2019-05-22 RX ORDER — CITALOPRAM 10 MG/1
30 TABLET, FILM COATED ORAL DAILY
Refills: 0 | Status: DISCONTINUED | OUTPATIENT
Start: 2019-05-22 | End: 2019-05-28

## 2019-05-22 RX ORDER — SODIUM CHLORIDE 9 MG/ML
3 INJECTION INTRAMUSCULAR; INTRAVENOUS; SUBCUTANEOUS EVERY 8 HOURS
Refills: 0 | Status: DISCONTINUED | OUTPATIENT
Start: 2019-05-22 | End: 2019-05-28

## 2019-05-22 RX ORDER — IPRATROPIUM/ALBUTEROL SULFATE 18-103MCG
3 AEROSOL WITH ADAPTER (GRAM) INHALATION EVERY 6 HOURS
Refills: 0 | Status: DISCONTINUED | OUTPATIENT
Start: 2019-05-22 | End: 2019-05-28

## 2019-05-22 RX ORDER — PANTOPRAZOLE SODIUM 20 MG/1
40 TABLET, DELAYED RELEASE ORAL
Refills: 0 | Status: DISCONTINUED | OUTPATIENT
Start: 2019-05-22 | End: 2019-05-28

## 2019-05-22 RX ORDER — METOPROLOL TARTRATE 50 MG
25 TABLET ORAL
Refills: 0 | Status: DISCONTINUED | OUTPATIENT
Start: 2019-05-22 | End: 2019-05-28

## 2019-05-22 RX ORDER — ASPIRIN/CALCIUM CARB/MAGNESIUM 324 MG
81 TABLET ORAL DAILY
Refills: 0 | Status: DISCONTINUED | OUTPATIENT
Start: 2019-05-22 | End: 2019-05-28

## 2019-05-22 RX ORDER — EPINEPHRINE 0.3 MG/.3ML
0.3 INJECTION INTRAMUSCULAR; SUBCUTANEOUS ONCE
Refills: 0 | Status: COMPLETED | OUTPATIENT
Start: 2019-05-22 | End: 2019-05-22

## 2019-05-22 RX ORDER — FUROSEMIDE 40 MG
20 TABLET ORAL DAILY
Refills: 0 | Status: DISCONTINUED | OUTPATIENT
Start: 2019-05-22 | End: 2019-05-24

## 2019-05-22 RX ORDER — CHLORHEXIDINE GLUCONATE 213 G/1000ML
1 SOLUTION TOPICAL
Refills: 0 | Status: DISCONTINUED | OUTPATIENT
Start: 2019-05-22 | End: 2019-05-28

## 2019-05-22 RX ORDER — METOPROLOL TARTRATE 50 MG
25 TABLET ORAL
Refills: 0 | Status: DISCONTINUED | OUTPATIENT
Start: 2019-05-22 | End: 2019-05-22

## 2019-05-22 RX ORDER — ALBUTEROL 90 UG/1
2 AEROSOL, METERED ORAL EVERY 6 HOURS
Refills: 0 | Status: DISCONTINUED | OUTPATIENT
Start: 2019-05-22 | End: 2019-05-28

## 2019-05-22 RX ADMIN — SODIUM CHLORIDE 3 MILLILITER(S): 9 INJECTION INTRAMUSCULAR; INTRAVENOUS; SUBCUTANEOUS at 15:01

## 2019-05-22 RX ADMIN — CHLORHEXIDINE GLUCONATE 1 APPLICATION(S): 213 SOLUTION TOPICAL at 09:24

## 2019-05-22 RX ADMIN — ENOXAPARIN SODIUM 40 MILLIGRAM(S): 100 INJECTION SUBCUTANEOUS at 12:31

## 2019-05-22 RX ADMIN — PANTOPRAZOLE SODIUM 40 MILLIGRAM(S): 20 TABLET, DELAYED RELEASE ORAL at 15:00

## 2019-05-22 RX ADMIN — BUDESONIDE AND FORMOTEROL FUMARATE DIHYDRATE 2 PUFF(S): 160; 4.5 AEROSOL RESPIRATORY (INHALATION) at 19:18

## 2019-05-22 RX ADMIN — ATORVASTATIN CALCIUM 80 MILLIGRAM(S): 80 TABLET, FILM COATED ORAL at 21:02

## 2019-05-22 RX ADMIN — Medication 3 MILLILITER(S): at 19:18

## 2019-05-22 RX ADMIN — BUDESONIDE AND FORMOTEROL FUMARATE DIHYDRATE 2 PUFF(S): 160; 4.5 AEROSOL RESPIRATORY (INHALATION) at 12:26

## 2019-05-22 RX ADMIN — GABAPENTIN 100 MILLIGRAM(S): 400 CAPSULE ORAL at 21:02

## 2019-05-22 RX ADMIN — Medication 50 MILLIGRAM(S): at 03:14

## 2019-05-22 RX ADMIN — Medication 60 MILLIGRAM(S): at 15:00

## 2019-05-22 RX ADMIN — Medication 60 MILLIGRAM(S): at 03:15

## 2019-05-22 RX ADMIN — Medication 5 MILLIGRAM(S): at 07:01

## 2019-05-22 RX ADMIN — Medication 3 MILLILITER(S): at 13:37

## 2019-05-22 RX ADMIN — Medication 3 MILLILITER(S): at 09:24

## 2019-05-22 RX ADMIN — Medication 0.5 MILLIGRAM(S): at 09:24

## 2019-05-22 RX ADMIN — SODIUM CHLORIDE 3 MILLILITER(S): 9 INJECTION INTRAMUSCULAR; INTRAVENOUS; SUBCUTANEOUS at 06:50

## 2019-05-22 RX ADMIN — Medication 0.5 MILLIGRAM(S): at 21:02

## 2019-05-22 RX ADMIN — EPINEPHRINE 0.3 MILLIGRAM(S): 0.3 INJECTION INTRAMUSCULAR; SUBCUTANEOUS at 00:25

## 2019-05-22 RX ADMIN — Medication 81 MILLIGRAM(S): at 14:59

## 2019-05-22 RX ADMIN — GABAPENTIN 100 MILLIGRAM(S): 400 CAPSULE ORAL at 15:01

## 2019-05-22 RX ADMIN — CITALOPRAM 30 MILLIGRAM(S): 10 TABLET, FILM COATED ORAL at 14:59

## 2019-05-22 RX ADMIN — Medication 25 MILLIGRAM(S): at 12:32

## 2019-05-22 NOTE — ED PROVIDER NOTE - CRITICAL CARE PROVIDED
documentation/additional history taking/consultation with other physicians/direct patient care (not related to procedure)

## 2019-05-22 NOTE — ED PROVIDER NOTE - NS ED ROS FT
Constitutional: no fevers/chills, no sick contacts  Eyes: No visual changes, eye pain or discharge. No photophobia  ENMT: No hearing changes, pain, discharge or infections. No sore throat or drooling, +voice change, +tongue swelling and trouble swallowing  Neck:  No neck pain or stiffness. No limited ROM  Cardiac: + SOB or edema. No chest pain with exertion.  Respiratory: No cough or respiratory distress. No hemoptysis. No history of asthma or RAD  GI: No nausea, vomiting, diarrhea or abdominal pain  : No dysuria, frequency or burning. No discharge  MS: No myalgia, muscle weakness, joint pain or back pain  Neuro: No headache or weakness. No LOC  Skin: No skin rash  Endo: no diabetes or thyroid dysfunction  Heme: no abnormal bleeding or clotting  Except as documented in the HPI, all other systems are negative

## 2019-05-22 NOTE — H&P ADULT - HISTORY OF PRESENT ILLNESS
74yF HTN COPD depression TIA p/w anaphylaxis.    HISTORY OF angioedema secondary to aerosol toxin (polyurothane as per patient ) APRIL 2019    Pt w/ hx anaphylaxis to antihypertensive (combination losartan-HCTZ) that required epi in the past (reportedly with quick decompensation, nearly intubated).  This time, pt reports no new medication, food, detergent, lotion, soap or other exposure.     Did not eat lunch or dinner after eating a late breakfast.    C/o tongue swelling, SOB and trouble swallowing similar to prior - started after dinner tonight - called EMS who gave her 0.3mg epi on scene prior to ED arrival.   reported persistent tongue swelling and voice change and pt intermittently hyperventilating about how it felt to swallow.  No drooling or stridor.    2ND DOSE OF EPI GIVEN IN OUR ER.

## 2019-05-22 NOTE — H&P ADULT - NSHPLABSRESULTS_GEN_ALL_CORE
05-21    143  |  108  |  21<H>  ----------------------------<  111<H>  4.1   |  26  |  0.9    Ca    9.1      21 May 2019 22:50                              12.9   10.28 )-----------( 250      ( 21 May 2019 22:50 )             41.1     CAPILLARY BLOOD GLUCOSE

## 2019-05-22 NOTE — CONSULT NOTE ADULT - SUBJECTIVE AND OBJECTIVE BOX
Patient is a 74y old  Female who presents with a chief complaint of allergic reaction (22 May 2019 01:45)      HPI:  74yF HTN COPD depression TIA p/w anaphylaxis.    HISTORY OF angioedema secondary to aerosol toxin (polyurothane as per patient ) APRIL 2019    Pt w/ hx anaphylaxis to antihypertensive (combination losartan-HCTZ) that required epi in the past (reportedly with quick decompensation, nearly intubated).  This time, pt reports no new medication, food, detergent, lotion, soap or other exposure.     Did not eat lunch or dinner after eating a late breakfast.    C/o tongue swelling, SOB and trouble swallowing similar to prior - started after dinner tonight - called EMS who gave her 0.3mg epi on scene prior to ED arrival.   reported persistent tongue swelling and voice change and pt intermittently hyperventilating about how it felt to swallow.  No drooling or stridor.    2ND DOSE OF EPI GIVEN IN OUR ER. (22 May 2019 01:45)  today she feel much better not on distress tongue swelling better   said took advil 2 hrs before she been taking it every day   PAST MEDICAL & SURGICAL HISTORY:  PVD (peripheral vascular disease)  Other emphysema  Other cardiomyopathy  TIA (transient ischemic attack)  COPD (chronic obstructive pulmonary disease)  Depression  Hypertension  History of cholecystectomy    Allergies    codeine (Other (Moderate))  Depakote (Unknown)    Intolerances      Family history : no cardiovscular family history   Home Medications:  aspirin 81 mg oral tablet: 1 tab(s) orally once a day (17 Nov 2018 11:08)  atorvastatin 80 mg oral tablet: 1 tab(s) orally once a day (17 Nov 2018 11:08)  ciclopirox 0.77% topical cream: Apply topically to affected area 2 times a day (17 Nov 2018 11:08)  citalopram: 30 milligram(s) orally once a day (17 Nov 2018 11:08)  Fish Oil 1200 mg oral capsule: twice a day (17 Nov 2018 11:08)  gabapentin 300 mg oral capsule: 1 cap(s) orally 3 times a day (22 Nov 2018 15:11)  guaiFENesin 100 mg/5 mL oral liquid: 5 milliliter(s) orally every 6 hours, As needed, Cough (18 Apr 2019 08:51)  LORazepam 0.5 mg oral tablet: 1 tab(s) orally 2 times a day, As Needed for anxiety (18 Apr 2019 08:51)  raNITIdine 150 mg oral capsule: 1 cap(s) orally 2 times a day (17 Nov 2018 11:08)    Occupation:  Alochol: Denied  Smoking: Denied  Drug Use: Denied  Marital Status:         ROS: as in HPI; All other systems reviewed are negative    ICU Vital Signs Last 24 Hrs  T(C): 35.6 (22 May 2019 07:01), Max: 36.9 (21 May 2019 22:33)  T(F): 96 (22 May 2019 07:01), Max: 98.5 (21 May 2019 22:33)  HR: 75 (22 May 2019 09:11) (75 - 82)  BP: 123/62 (22 May 2019 09:11) (117/62 - 135/64)  BP(mean): 85 (22 May 2019 09:11) (83 - 85)  ABP: --  ABP(mean): --  RR: 19 (22 May 2019 09:11) (17 - 19)  SpO2: 94% (22 May 2019 09:11) (93% - 99%)        Physical Examination:    General: No acute distress.  Alert, oriented, interactive, nonfocal    HEENT: Pupils equal, reactive to light.  Symmetric.    PULM: Clear to auscultation bilaterally, no significant sputum production    CVS: Regular rate and rhythm, no murmurs, rubs, or gallops    ABD: Soft, nondistended, nontender, normoactive bowel sounds, no masses    EXT: No edema, nontender, no clubbing     SKIN: Warm and well perfused, no rashes noted.    Neurology : no motor or sensory deficit     Musculoskeletal : move all extremity     Lymphatic system: no Palpable node               I&O's Detail    21 May 2019 07:01  -  22 May 2019 07:00  --------------------------------------------------------  IN:  Total IN: 0 mL    OUT:    Voided: 75 mL  Total OUT: 75 mL    Total NET: -75 mL            LABS:                        11.7   7.19  )-----------( 207      ( 22 May 2019 06:04 )             36.7     22 May 2019 06:04    143    |  110    |  21     ----------------------------<  139    4.1     |  22     |  0.8      Ca    9.3        22 May 2019 06:04  Mg     1.8       22 May 2019 06:04            CAPILLARY BLOOD GLUCOSE            Culture        MEDICATIONS  (STANDING):  ALBUTerol/ipratropium for Nebulization 3 milliLiter(s) Nebulizer every 6 hours  aspirin  chewable 81 milliGRAM(s) Oral daily  atorvastatin 80 milliGRAM(s) Oral at bedtime  buDESOnide 160 MICROgram(s)/formoterol 4.5 MICROgram(s) Inhaler 2 Puff(s) Inhalation two times a day  chlorhexidine 4% Liquid 1 Application(s) Topical <User Schedule>  citalopram 30 milliGRAM(s) Oral daily  enoxaparin Injectable 40 milliGRAM(s) SubCutaneous daily  furosemide    Tablet 20 milliGRAM(s) Oral daily  gabapentin 300 milliGRAM(s) Oral three times a day  methylPREDNISolone sodium succinate Injectable 60 milliGRAM(s) IV Push every 8 hours  metoprolol tartrate Injectable 5 milliGRAM(s) IV Push every 6 hours  pantoprazole  Injectable 40 milliGRAM(s) IV Push daily  sodium chloride 0.9% lock flush 3 milliLiter(s) IV Push every 8 hours    MEDICATIONS  (PRN):  ALBUTerol    90 MICROgram(s) HFA Inhaler 2 Puff(s) Inhalation every 6 hours PRN Bronchospasm  diphenhydrAMINE   Injectable 50 milliGRAM(s) IV Push every 8 hours PRN Allergy symptoms  LORazepam     Tablet 0.5 milliGRAM(s) Oral two times a day PRN Anxiety        RADIOLOGY: ***     CXR: no infiltrate   TLC:  OG:  ET tube:        CAM ICU:  ECHO:

## 2019-05-22 NOTE — ED PROVIDER NOTE - CLINICAL SUMMARY MEDICAL DECISION MAKING FREE TEXT BOX
74yF HTN COPD depression TIA p/w anaphylaxis - given epi 0.3mg by EMS, given steroids, pepcid, benadryl, alb, IV LR in the ED w/ partial improvement.  Given epi 0.3mg in the ED - no worsening, no stridor or drooling - will adm to ICU for close monitoring.

## 2019-05-22 NOTE — ED ADULT NURSE NOTE - NSIMPLEMENTINTERV_GEN_ALL_ED
Implemented All Universal Safety Interventions:  [place] to call system. Call bell, personal items and telephone within reach. Instruct patient to call for assistance. Room bathroom lighting operational. Non-slip footwear when patient is off stretcher. Physically safe environment: no spills, clutter or unnecessary equipment. Stretcher in lowest position, wheels locked, appropriate side rails in place.

## 2019-05-22 NOTE — ED ADULT NURSE REASSESSMENT NOTE - NS ED NURSE REASSESS COMMENT FT1
At 0023 pt woken, when asked how she feeling stated " whatever you gave me isn't working it is getting worse." MD Klerman made aware medication ordered. Vs stable. at this time hr 83 bp 126/56 02 stat 95 % 2L NC.

## 2019-05-22 NOTE — CONSULT NOTE ADULT - ASSESSMENT
IMPRESSION:  anaphylaxis shock   asthma       PLAN:    CNS: no sedation     HEENT: oral care     PULMONARY: keep pox . 92 %   d/c solumedrol   start prednisone 60 mg and taper   benadryl   CARDIOVASCULAR: keep Is = os     GI: GI prophylaxis.  start clear liquid     RENAL: follow lytes     INFECTIOUS DISEASE: no abx for now     HEMATOLOGICAL:  DVT prophylaxis.    ENDOCRINE:  Follow up FS.  Insulin protocol if needed.    MUSCULOSKELETAL:        CRITICAL CARE TIME SPENT: ***

## 2019-05-22 NOTE — ED PROVIDER NOTE - PROGRESS NOTE DETAILS
Pt w/ slowly improving voice change, improving SOB but still feels like it's hard to swallow.  Will give epi 0.3mg (2nd dose tonight, 1st dose in the ED) and admit to ICU.

## 2019-05-22 NOTE — H&P ADULT - ASSESSMENT
74F with COPD, Depression and TIA presnets from home for sudden onset shortness of breath.    Acute Hypoxic Repiratory Distress: Multifactorial COPD exacerbation  Reactive airway disease and component of angioedema    Continue with Solumedrol  Cpap at night   Duonebs PRN    COPD: Cont with some meds and steroids     Depression: Cont with home meds     GI ppx: ppi while on steroids   DVT ppx: on lovenox  Full Code  Diet: regular   Activity: As tolerated

## 2019-05-22 NOTE — ED ADULT NURSE REASSESSMENT NOTE - NS ED NURSE REASSESS COMMENT FT1
pt states she is feeling better swelling of tongue has gone down and easier to swallow then before. feels her speech is better as well, speech is clearer and able to understand pt.

## 2019-05-22 NOTE — ED PROVIDER NOTE - PHYSICAL EXAMINATION
CONSTITUTIONAL: well developed; well nourished; well appearing in mild distress  HEAD: normocephalic; atraumatic  EYES: no conjunctival injection, no scleral icterus  ENT: no nasal discharge; airway clear  M: moderately dry MM, mildly thickened tongue and uvula, mild voice changes  NECK: supple; non tender. + full passive ROM in all directions  CARD: S1, S2 normal; no murmurs, gallops, or rubs. Regular rate and rhythm  RESP: no wheezes, rales or rhonchi. Good air movement bilaterally without significant accessory muscle use  ABD: soft; non-distended; non-tender. No rebound, no guarding, no pulsatile abdominal mass  EXT: moving all extremities spontaneously, normal ROM. No clubbing, cyanosis or edema  SKIN: warm and dry, no lesions noted  NEURO: alert, oriented, CN II-XII grossly intact, motor and sensory grossly intact, speech nonslurred, no focal deficits. GCS 15  PSYCH: calm, cooperative, appropriate, good eye contact, logical thought process, no apparent danger to self or others

## 2019-05-22 NOTE — H&P ADULT - NSHPPHYSICALEXAM_GEN_ALL_CORE
Vital Signs Last 24 Hrs  T(C): 36.9 (21 May 2019 22:33), Max: 36.9 (21 May 2019 22:33)  T(F): 98.5 (21 May 2019 22:33), Max: 98.5 (21 May 2019 22:33)  HR: 82 (22 May 2019 01:32) (77 - 82)  BP: 120/59 (22 May 2019 01:32) (118/58 - 135/64)  RR: 19 (21 May 2019 23:40) (19 - 19)  SpO2: 95% (22 May 2019 01:32) (93% - 97%)    CONSTITUTIONAL: well developed; well nourished; well appearing in mild distress  HEAD: normocephalic; atraumatic  EYES: no conjunctival injection, no scleral icterus  ENT: no nasal discharge; airway clear  M: moderately dry MM, mildly thickened tongue and uvula, mild voice changes  NECK: supple; non tender. + full passive ROM in all directions  CARD: S1, S2 normal; no murmurs, gallops, or rubs. Regular rate and rhythm  RESP: no wheezes, rales or rhonchi. Good air movement bilaterally without significant accessory muscle use  ABD: soft; non-distended; non-tender. No rebound, no guarding, no pulsatile abdominal mass  EXT: moving all extremities spontaneously, normal ROM. No clubbing, cyanosis or edema  SKIN: warm and dry, no lesions noted  NEURO: alert, oriented, CN II-XII grossly intact, motor and sensory grossly intact, speech nonslurred, no focal deficits. GCS 15  PSYCH: calm, cooperative, appropriate, good eye contact, logical thought process, no apparent danger to self or others

## 2019-05-22 NOTE — ED PROVIDER NOTE - OBJECTIVE STATEMENT
74yF HTN COPD depression TIA p/w anaphylaxis.  Pt w/ hx anaphylaxis to antihypertensive (combination losartan-HCTZ) that required epi in the past (reportedly with quick decompensation, nearly intubated).  This time, pt reports no new medication, food, detergent, lotion, soap or other exposure.  Did not eat lunch or dinner after eating a late breakfast.  C/o tongue swelling, SOB and trouble swallowing similar to prior - started after dinner tonight - called EMS who gave her 0.3mg epi on scene prior to ED arrival.   reported persistent tongue swelling and voice change and pt intermittently hyperventilating about how it felt to swallow.  No drooling or stridor.

## 2019-05-23 DIAGNOSIS — R07.9 CHEST PAIN, UNSPECIFIED: ICD-10-CM

## 2019-05-23 DIAGNOSIS — J44.9 CHRONIC OBSTRUCTIVE PULMONARY DISEASE, UNSPECIFIED: ICD-10-CM

## 2019-05-23 DIAGNOSIS — T78.2XXA ANAPHYLACTIC SHOCK, UNSPECIFIED, INITIAL ENCOUNTER: ICD-10-CM

## 2019-05-23 DIAGNOSIS — I10 ESSENTIAL (PRIMARY) HYPERTENSION: ICD-10-CM

## 2019-05-23 LAB
ANION GAP SERPL CALC-SCNC: 11 MMOL/L — SIGNIFICANT CHANGE UP (ref 7–14)
BUN SERPL-MCNC: 27 MG/DL — HIGH (ref 10–20)
CALCIUM SERPL-MCNC: 9.3 MG/DL — SIGNIFICANT CHANGE UP (ref 8.5–10.1)
CHLORIDE SERPL-SCNC: 109 MMOL/L — SIGNIFICANT CHANGE UP (ref 98–110)
CK MB CFR SERPL CALC: 7.7 NG/ML — HIGH (ref 0.6–6.3)
CK MB CFR SERPL CALC: 7.9 NG/ML — HIGH (ref 0.6–6.3)
CK SERPL-CCNC: 124 U/L — SIGNIFICANT CHANGE UP (ref 0–225)
CK SERPL-CCNC: 127 U/L — SIGNIFICANT CHANGE UP (ref 0–225)
CO2 SERPL-SCNC: 23 MMOL/L — SIGNIFICANT CHANGE UP (ref 17–32)
CREAT SERPL-MCNC: 0.8 MG/DL — SIGNIFICANT CHANGE UP (ref 0.7–1.5)
GLUCOSE SERPL-MCNC: 131 MG/DL — HIGH (ref 70–99)
HCT VFR BLD CALC: 35.6 % — LOW (ref 37–47)
HGB BLD-MCNC: 11.1 G/DL — LOW (ref 12–16)
MCHC RBC-ENTMCNC: 28.6 PG — SIGNIFICANT CHANGE UP (ref 27–31)
MCHC RBC-ENTMCNC: 31.2 G/DL — LOW (ref 32–37)
MCV RBC AUTO: 91.8 FL — SIGNIFICANT CHANGE UP (ref 81–99)
NRBC # BLD: 0 /100 WBCS — SIGNIFICANT CHANGE UP (ref 0–0)
PLATELET # BLD AUTO: 198 K/UL — SIGNIFICANT CHANGE UP (ref 130–400)
POTASSIUM SERPL-MCNC: 4.5 MMOL/L — SIGNIFICANT CHANGE UP (ref 3.5–5)
POTASSIUM SERPL-SCNC: 4.5 MMOL/L — SIGNIFICANT CHANGE UP (ref 3.5–5)
RBC # BLD: 3.88 M/UL — LOW (ref 4.2–5.4)
RBC # FLD: 15.3 % — HIGH (ref 11.5–14.5)
SODIUM SERPL-SCNC: 143 MMOL/L — SIGNIFICANT CHANGE UP (ref 135–146)
TROPONIN T SERPL-MCNC: <0.01 NG/ML — SIGNIFICANT CHANGE UP
TROPONIN T SERPL-MCNC: <0.01 NG/ML — SIGNIFICANT CHANGE UP
WBC # BLD: 8.85 K/UL — SIGNIFICANT CHANGE UP (ref 4.8–10.8)
WBC # FLD AUTO: 8.85 K/UL — SIGNIFICANT CHANGE UP (ref 4.8–10.8)

## 2019-05-23 PROCEDURE — 71045 X-RAY EXAM CHEST 1 VIEW: CPT | Mod: 26

## 2019-05-23 RX ORDER — NITROGLYCERIN 6.5 MG
0.4 CAPSULE, EXTENDED RELEASE ORAL
Refills: 0 | Status: DISCONTINUED | OUTPATIENT
Start: 2019-05-23 | End: 2019-05-28

## 2019-05-23 RX ADMIN — Medication 25 MILLIGRAM(S): at 05:17

## 2019-05-23 RX ADMIN — ATORVASTATIN CALCIUM 80 MILLIGRAM(S): 80 TABLET, FILM COATED ORAL at 21:04

## 2019-05-23 RX ADMIN — BUDESONIDE AND FORMOTEROL FUMARATE DIHYDRATE 2 PUFF(S): 160; 4.5 AEROSOL RESPIRATORY (INHALATION) at 07:46

## 2019-05-23 RX ADMIN — Medication 25 MILLIGRAM(S): at 12:59

## 2019-05-23 RX ADMIN — Medication 0.5 MILLIGRAM(S): at 05:31

## 2019-05-23 RX ADMIN — Medication 25 MILLIGRAM(S): at 17:14

## 2019-05-23 RX ADMIN — CITALOPRAM 30 MILLIGRAM(S): 10 TABLET, FILM COATED ORAL at 12:50

## 2019-05-23 RX ADMIN — Medication 0.5 MILLIGRAM(S): at 21:05

## 2019-05-23 RX ADMIN — Medication 3 MILLILITER(S): at 20:12

## 2019-05-23 RX ADMIN — ENOXAPARIN SODIUM 40 MILLIGRAM(S): 100 INJECTION SUBCUTANEOUS at 12:50

## 2019-05-23 RX ADMIN — GABAPENTIN 100 MILLIGRAM(S): 400 CAPSULE ORAL at 05:17

## 2019-05-23 RX ADMIN — SODIUM CHLORIDE 3 MILLILITER(S): 9 INJECTION INTRAMUSCULAR; INTRAVENOUS; SUBCUTANEOUS at 17:14

## 2019-05-23 RX ADMIN — Medication 81 MILLIGRAM(S): at 12:50

## 2019-05-23 RX ADMIN — Medication 1 APPLICATION(S): at 17:14

## 2019-05-23 RX ADMIN — PANTOPRAZOLE SODIUM 40 MILLIGRAM(S): 20 TABLET, DELAYED RELEASE ORAL at 06:02

## 2019-05-23 RX ADMIN — SODIUM CHLORIDE 3 MILLILITER(S): 9 INJECTION INTRAMUSCULAR; INTRAVENOUS; SUBCUTANEOUS at 05:18

## 2019-05-23 RX ADMIN — Medication 0.4 MILLIGRAM(S): at 14:33

## 2019-05-23 RX ADMIN — Medication 3 MILLILITER(S): at 08:15

## 2019-05-23 RX ADMIN — Medication 60 MILLIGRAM(S): at 06:04

## 2019-05-23 RX ADMIN — Medication 3 MILLILITER(S): at 13:30

## 2019-05-23 RX ADMIN — CHLORHEXIDINE GLUCONATE 1 APPLICATION(S): 213 SOLUTION TOPICAL at 05:17

## 2019-05-23 RX ADMIN — SODIUM CHLORIDE 3 MILLILITER(S): 9 INJECTION INTRAMUSCULAR; INTRAVENOUS; SUBCUTANEOUS at 00:31

## 2019-05-23 RX ADMIN — Medication 20 MILLIGRAM(S): at 05:17

## 2019-05-23 NOTE — PROGRESS NOTE ADULT - SUBJECTIVE AND OBJECTIVE BOX
Patient reports the swelling in her mouth has resolved but she still has some "tingling under her tongue". She also reports having chest pressure last night which has since resolved      T(F): 97.2 (05-23-19 @ 10:56), Max: 97.2 (05-22-19 @ 19:04)  HR: 90 (05-23-19 @ 14:20)  BP: 129/55 (05-23-19 @ 14:20)  RR: 36 (05-23-19 @ 14:20)  SpO2: 98% (05-23-19 @ 14:20) (92% - 100%)    PHYSICAL EXAM:  GENERAL: NAD  HEAD:  Atraumatic, Normocephalic  EYES: EOMI, PERRLA, conjunctiva and sclera clear  ENMT: No tonsillar erythema, exudates, or enlargement; Moist mucous membranes, Good dentition, No lesions  NECK: Supple, No JVD, Normal thyroid  NERVOUS SYSTEM:  Alert & Oriented X3, no focal deficts  CHEST/LUNG: Clear to percussion bilaterally; No rales, rhonchi, wheezing, or rubs  HEART: Regular rate and rhythm; No murmurs, rubs, or gallops  ABDOMEN: Soft, Nontender, Nondistended; Bowel sounds present  EXTREMITIES:  b/l  edema    LABS  05-23    143  |  109  |  27<H>  ----------------------------<  131<H>  4.5   |  23  |  0.8    Ca    9.3      23 May 2019 05:25  Mg     1.8     05-22                            11.1   8.85  )-----------( 198      ( 23 May 2019 05:25 )             35.6         CARDIAC ENZYMES  Creatine Kinase, Serum: 124 (05-23 @ 11:25)    CKMB Units: 7.9 (05-23 @ 11:25)    Troponin T, Serum: <0.01 ng/mL (05-23-19 @ 11:25)    < from: 12 Lead ECG (05.22.19 @ 06:29) >    Diagnosis Line Normal sinus rhythm  Possible Left atrial enlargement  Borderline ECG    < end of copied text >      RADIOLOGY  < from: Xray Chest 1 View- PORTABLE-Routine (05.23.19 @ 05:53) >  Impression:      Pulmonary vascular congestion. Parenchymal opacity pleural effusion or   air leak    < end of copied text >    MEDICATIONS  (STANDING):  ALBUTerol/ipratropium for Nebulization 3 milliLiter(s) Nebulizer every 6 hours  aspirin  chewable 81 milliGRAM(s) Oral daily  atorvastatin 80 milliGRAM(s) Oral at bedtime  chlorhexidine 4% Liquid 1 Application(s) Topical <User Schedule>  citalopram 30 milliGRAM(s) Oral daily  enoxaparin Injectable 40 milliGRAM(s) SubCutaneous daily  furosemide    Tablet 20 milliGRAM(s) Oral daily  metoprolol tartrate 25 milliGRAM(s) Oral two times a day  pantoprazole    Tablet 40 milliGRAM(s) Oral before breakfast  predniSONE   Tablet 60 milliGRAM(s) Oral daily  pregabalin 25 milliGRAM(s) Oral daily  silver sulfADIAZINE 1% Cream 1 Application(s) Topical two times a day      MEDICATIONS  (PRN):  ALBUTerol    90 MICROgram(s) HFA Inhaler 2 Puff(s) Inhalation every 6 hours PRN Bronchospasm  diphenhydrAMINE   Injectable 50 milliGRAM(s) IV Push every 8 hours PRN Allergy symptoms  LORazepam     Tablet 0.5 milliGRAM(s) Oral two times a day PRN Anxiety  nitroglycerin     SubLingual 0.4 milliGRAM(s) SubLingual every 5 minutes PRN Chest Pain

## 2019-05-23 NOTE — PROGRESS NOTE ADULT - SUBJECTIVE AND OBJECTIVE BOX
Patient is a 74y old  Female who presents with a chief complaint of allergic reaction (22 May 2019 09:33)      Over Night Events:  Patient seen and examined feel better no tongue swelling       ROS:  See HPI    PHYSICAL EXAM    ICU Vital Signs Last 24 Hrs  T(C): 35.9 (23 May 2019 03:07), Max: 36.2 (22 May 2019 19:04)  T(F): 96.6 (23 May 2019 03:07), Max: 97.2 (22 May 2019 19:04)  HR: 78 (23 May 2019 07:27) (75 - 88)  BP: 110/57 (23 May 2019 07:27) (103/59 - 131/66)  BP(mean): 75 (23 May 2019 07:27) (74 - 91)  ABP: --  ABP(mean): --  RR: 54 (23 May 2019 07:27) (15 - 54)  SpO2: 97% (23 May 2019 07:27) (92% - 100%)      General: Aox3  HEENT:      miesha          Lymph Nodes: NO cervical LN   Lungs: Bilateral BS  Cardiovascular: Regular   Abdomen: Soft, Positive BS  Extremities: No clubbing   Skin: warm   Neurological: no focal deficit   Musculoskeletal: move all ext     I&O's Detail    22 May 2019 07:01  -  23 May 2019 07:00  --------------------------------------------------------  IN:  Total IN: 0 mL    OUT:    Voided: 1 mL  Total OUT: 1 mL    Total NET: -1 mL          LABS:                          11.1   8.85  )-----------( 198      ( 23 May 2019 05:25 )             35.6         23 May 2019 05:25    143    |  109    |  27     ----------------------------<  131    4.5     |  23     |  0.8      Ca    9.3        23 May 2019 05:25  Mg     1.8       22 May 2019 06:04                                                                                                                                                                                                                                       MEDICATIONS  (STANDING):  ALBUTerol/ipratropium for Nebulization 3 milliLiter(s) Nebulizer every 6 hours  aspirin  chewable 81 milliGRAM(s) Oral daily  atorvastatin 80 milliGRAM(s) Oral at bedtime  buDESOnide 160 MICROgram(s)/formoterol 4.5 MICROgram(s) Inhaler 2 Puff(s) Inhalation two times a day  chlorhexidine 4% Liquid 1 Application(s) Topical <User Schedule>  citalopram 30 milliGRAM(s) Oral daily  enoxaparin Injectable 40 milliGRAM(s) SubCutaneous daily  furosemide    Tablet 20 milliGRAM(s) Oral daily  gabapentin 100 milliGRAM(s) Oral three times a day  metoprolol tartrate 25 milliGRAM(s) Oral two times a day  pantoprazole    Tablet 40 milliGRAM(s) Oral before breakfast  predniSONE   Tablet 60 milliGRAM(s) Oral daily  sodium chloride 0.9% lock flush 3 milliLiter(s) IV Push every 8 hours    MEDICATIONS  (PRN):  ALBUTerol    90 MICROgram(s) HFA Inhaler 2 Puff(s) Inhalation every 6 hours PRN Bronchospasm  diphenhydrAMINE   Injectable 50 milliGRAM(s) IV Push every 8 hours PRN Allergy symptoms  LORazepam     Tablet 0.5 milliGRAM(s) Oral two times a day PRN Anxiety          Xrays:  TLC:  OG:  ET tube:                                                                                       ECHO:  [facility] ICU: Patient is a 74y old  Female who presents with a chief complaint of allergic reaction (22 May 2019 09:33)      Over Night Events:  Patient seen and examined said this morning she had tongue swelling but on exam no sign of swelling also cp releived now       ROS:  See HPI    PHYSICAL EXAM    ICU Vital Signs Last 24 Hrs  T(C): 35.9 (23 May 2019 03:07), Max: 36.2 (22 May 2019 19:04)  T(F): 96.6 (23 May 2019 03:07), Max: 97.2 (22 May 2019 19:04)  HR: 78 (23 May 2019 07:27) (75 - 88)  BP: 110/57 (23 May 2019 07:27) (103/59 - 131/66)  BP(mean): 75 (23 May 2019 07:27) (74 - 91)  ABP: --  ABP(mean): --  RR: 54 (23 May 2019 07:27) (15 - 54)  SpO2: 97% (23 May 2019 07:27) (92% - 100%)      General: Aox3  HEENT:      miesha          Lymph Nodes: NO cervical LN   Lungs: Bilateral BS  Cardiovascular: Regular   Abdomen: Soft, Positive BS  Extremities: No clubbing   Skin: warm   Neurological: no focal deficit   Musculoskeletal: move all ext     I&O's Detail    22 May 2019 07:01  -  23 May 2019 07:00  --------------------------------------------------------  IN:  Total IN: 0 mL    OUT:    Voided: 1 mL  Total OUT: 1 mL    Total NET: -1 mL          LABS:                          11.1   8.85  )-----------( 198      ( 23 May 2019 05:25 )             35.6         23 May 2019 05:25    143    |  109    |  27     ----------------------------<  131    4.5     |  23     |  0.8      Ca    9.3        23 May 2019 05:25  Mg     1.8       22 May 2019 06:04                                                                                                                                                                                                                                       MEDICATIONS  (STANDING):  ALBUTerol/ipratropium for Nebulization 3 milliLiter(s) Nebulizer every 6 hours  aspirin  chewable 81 milliGRAM(s) Oral daily  atorvastatin 80 milliGRAM(s) Oral at bedtime  buDESOnide 160 MICROgram(s)/formoterol 4.5 MICROgram(s) Inhaler 2 Puff(s) Inhalation two times a day  chlorhexidine 4% Liquid 1 Application(s) Topical <User Schedule>  citalopram 30 milliGRAM(s) Oral daily  enoxaparin Injectable 40 milliGRAM(s) SubCutaneous daily  furosemide    Tablet 20 milliGRAM(s) Oral daily  gabapentin 100 milliGRAM(s) Oral three times a day  metoprolol tartrate 25 milliGRAM(s) Oral two times a day  pantoprazole    Tablet 40 milliGRAM(s) Oral before breakfast  predniSONE   Tablet 60 milliGRAM(s) Oral daily  sodium chloride 0.9% lock flush 3 milliLiter(s) IV Push every 8 hours    MEDICATIONS  (PRN):  ALBUTerol    90 MICROgram(s) HFA Inhaler 2 Puff(s) Inhalation every 6 hours PRN Bronchospasm  diphenhydrAMINE   Injectable 50 milliGRAM(s) IV Push every 8 hours PRN Allergy symptoms  LORazepam     Tablet 0.5 milliGRAM(s) Oral two times a day PRN Anxiety          Xrays:  TLC:  OG:  ET tube:                                                                                       ECHO:  CAM ICU:

## 2019-05-23 NOTE — PROGRESS NOTE ADULT - ASSESSMENT
Impression    Anaphylaxis  Acute hypoxic respiratory failure   COPD  HTN  former smoker    Plan    CNS: no sedation   will hold gabapentin for concern of anaphylactic reaction  lyrica started     HEENT: oral care     PULMONARY: keep sat>92 %   off solumedrol. Now on po prednisone. will taper off steroids  Iv benadryl prn  nebs prn  d/c symbicort  cpap at night    CARDIOVASCULAR:   ECG this morning revealed nonspecific st changes . case discussed with cards  check serial trops and serial trops  check 2d echo  cards c/s placed  keep I=O    GI: GI prophylaxis.  started on dysphagia 1 pureed diet today    RENAL: follow lytes     INFECTIOUS DISEASE: no abx for now     HEMATOLOGICAL:  DVT prophylaxis.    ENDOCRINE:  Follow up FS.  Insulin protocol if needed.    Musculoskeletal: Out of bed to chair

## 2019-05-23 NOTE — SWALLOW BEDSIDE ASSESSMENT ADULT - NS SPL SWALLOW CLINIC TRIAL FT
reported discomfort with puree not freddy cracker, however not lingual residue or strain during swallow. asked for oatmeal.

## 2019-05-23 NOTE — PROGRESS NOTE ADULT - SUBJECTIVE AND OBJECTIVE BOX
SUBJECTIVE:    Patient is a 74y old Female who presents with a chief complaint of Tongue swelling and shortness of breath.  Currently admitted to medicine with the primary diagnosis of Anaphylaxis, initial encounter  Today is hospital day 1d.   Overnight Events : Pt reported another episode of tongue swelling this morning and also had an episode of burning sensation in chest both of which have resolved now. Denies other symptoms.    PAST MEDICAL & SURGICAL HISTORY  PVD (peripheral vascular disease)  Other emphysema  Other cardiomyopathy  TIA (transient ischemic attack)  COPD (chronic obstructive pulmonary disease)  Depression  Hypertension  History of cholecystectomy    SOCIAL HISTORY:  Former smoeker    ALLERGIES:  codeine (Other (Moderate))  Depakote (Unknown)  losartan (anaphylaxis)  Verapamil    MEDICATIONS:  STANDING MEDICATIONS  ALBUTerol/ipratropium for Nebulization 3 milliLiter(s) Nebulizer every 6 hours  aspirin  chewable 81 milliGRAM(s) Oral daily  atorvastatin 80 milliGRAM(s) Oral at bedtime  chlorhexidine 4% Liquid 1 Application(s) Topical <User Schedule>  citalopram 30 milliGRAM(s) Oral daily  enoxaparin Injectable 40 milliGRAM(s) SubCutaneous daily  furosemide    Tablet 20 milliGRAM(s) Oral daily  metoprolol tartrate 25 milliGRAM(s) Oral two times a day  pantoprazole    Tablet 40 milliGRAM(s) Oral before breakfast  predniSONE   Tablet 60 milliGRAM(s) Oral daily  silver sulfADIAZINE 1% Cream 1 Application(s) Topical two times a day  sodium chloride 0.9% lock flush 3 milliLiter(s) IV Push every 8 hours    PRN MEDICATIONS  ALBUTerol    90 MICROgram(s) HFA Inhaler 2 Puff(s) Inhalation every 6 hours PRN  diphenhydrAMINE   Injectable 50 milliGRAM(s) IV Push every 8 hours PRN  LORazepam     Tablet 0.5 milliGRAM(s) Oral two times a day PRN    VITALS:   T(F): 96.2  HR: 91  BP: 112/53  RR: 42  SpO2: 96%    LABS:                        11.1   8.85  )-----------( 198      ( 23 May 2019 05:25 )             35.6     05-23    143  |  109  |  27<H>  ----------------------------<  131<H>  4.5   |  23  |  0.8    Ca    9.3      23 May 2019 05:25  Mg     1.8     05-22            RADIOLOGY:    < from: Xray Chest 2 Views PA/Lat (05.02.19 @ 15:12) >  No radiographic evidence of acute cardiopulmonary disease. Chronic   diffuse interstitial opacities.      < end of copied text >      PHYSICAL EXAM:  GEN: Not in acute distress  HEENT: NO tongue swelling on exam  LUNGS: Clear to auscultation bilaterally   HEART: S1/S2 present. RRR.   ABD: Soft, non-tender, non-distended. Bowel sounds present  EXT: NC/NC/NE/2+PP/BRADY  NEURO: AAOX3

## 2019-05-23 NOTE — SWALLOW BEDSIDE ASSESSMENT ADULT - SWALLOW EVAL: RECOMMENDED DIET
Dysphagia diet I puree consistency thin liquids, likely could tolerate chewables patient declined at this time. but did tolerate a soaked freddy cracker; complained about puree but no residue noted

## 2019-05-24 DIAGNOSIS — N39.0 URINARY TRACT INFECTION, SITE NOT SPECIFIED: ICD-10-CM

## 2019-05-24 DIAGNOSIS — I50.31 ACUTE DIASTOLIC (CONGESTIVE) HEART FAILURE: ICD-10-CM

## 2019-05-24 LAB
ANION GAP SERPL CALC-SCNC: 10 MMOL/L — SIGNIFICANT CHANGE UP (ref 7–14)
APPEARANCE UR: CLEAR — SIGNIFICANT CHANGE UP
BACTERIA # UR AUTO: ABNORMAL
BILIRUB UR-MCNC: NEGATIVE — SIGNIFICANT CHANGE UP
BUN SERPL-MCNC: 27 MG/DL — HIGH (ref 10–20)
CALCIUM SERPL-MCNC: 9.3 MG/DL — SIGNIFICANT CHANGE UP (ref 8.5–10.1)
CHLORIDE SERPL-SCNC: 107 MMOL/L — SIGNIFICANT CHANGE UP (ref 98–110)
CO2 SERPL-SCNC: 27 MMOL/L — SIGNIFICANT CHANGE UP (ref 17–32)
COLOR SPEC: YELLOW — SIGNIFICANT CHANGE UP
CREAT SERPL-MCNC: 0.9 MG/DL — SIGNIFICANT CHANGE UP (ref 0.7–1.5)
DIFF PNL FLD: NEGATIVE — SIGNIFICANT CHANGE UP
EPI CELLS # UR: ABNORMAL /HPF
GLUCOSE SERPL-MCNC: 74 MG/DL — SIGNIFICANT CHANGE UP (ref 70–99)
GLUCOSE UR QL: NEGATIVE MG/DL — SIGNIFICANT CHANGE UP
HCT VFR BLD CALC: 39.3 % — SIGNIFICANT CHANGE UP (ref 37–47)
HGB BLD-MCNC: 12.4 G/DL — SIGNIFICANT CHANGE UP (ref 12–16)
KETONES UR-MCNC: NEGATIVE — SIGNIFICANT CHANGE UP
LEUKOCYTE ESTERASE UR-ACNC: ABNORMAL
MCHC RBC-ENTMCNC: 29.3 PG — SIGNIFICANT CHANGE UP (ref 27–31)
MCHC RBC-ENTMCNC: 31.6 G/DL — LOW (ref 32–37)
MCV RBC AUTO: 92.9 FL — SIGNIFICANT CHANGE UP (ref 81–99)
NITRITE UR-MCNC: POSITIVE
NRBC # BLD: 0 /100 WBCS — SIGNIFICANT CHANGE UP (ref 0–0)
NT-PROBNP SERPL-SCNC: 4946 PG/ML — HIGH (ref 0–300)
PH UR: 6.5 — SIGNIFICANT CHANGE UP (ref 5–8)
PLATELET # BLD AUTO: 204 K/UL — SIGNIFICANT CHANGE UP (ref 130–400)
POTASSIUM SERPL-MCNC: 4.3 MMOL/L — SIGNIFICANT CHANGE UP (ref 3.5–5)
POTASSIUM SERPL-SCNC: 4.3 MMOL/L — SIGNIFICANT CHANGE UP (ref 3.5–5)
PROT UR-MCNC: NEGATIVE MG/DL — SIGNIFICANT CHANGE UP
RBC # BLD: 4.23 M/UL — SIGNIFICANT CHANGE UP (ref 4.2–5.4)
RBC # FLD: 15.8 % — HIGH (ref 11.5–14.5)
SODIUM SERPL-SCNC: 144 MMOL/L — SIGNIFICANT CHANGE UP (ref 135–146)
SP GR SPEC: 1.01 — SIGNIFICANT CHANGE UP (ref 1.01–1.03)
UROBILINOGEN FLD QL: 0.2 MG/DL — SIGNIFICANT CHANGE UP (ref 0.2–0.2)
WBC # BLD: 12.57 K/UL — HIGH (ref 4.8–10.8)
WBC # FLD AUTO: 12.57 K/UL — HIGH (ref 4.8–10.8)
WBC UR QL: ABNORMAL /HPF

## 2019-05-24 PROCEDURE — 71045 X-RAY EXAM CHEST 1 VIEW: CPT | Mod: 26

## 2019-05-24 PROCEDURE — 71275 CT ANGIOGRAPHY CHEST: CPT | Mod: 26

## 2019-05-24 RX ORDER — ACETAMINOPHEN 500 MG
650 TABLET ORAL EVERY 6 HOURS
Refills: 0 | Status: DISCONTINUED | OUTPATIENT
Start: 2019-05-24 | End: 2019-05-26

## 2019-05-24 RX ORDER — FUROSEMIDE 40 MG
40 TABLET ORAL DAILY
Refills: 0 | Status: DISCONTINUED | OUTPATIENT
Start: 2019-05-24 | End: 2019-05-24

## 2019-05-24 RX ORDER — CEFTRIAXONE 500 MG/1
1 INJECTION, POWDER, FOR SOLUTION INTRAMUSCULAR; INTRAVENOUS ONCE
Refills: 0 | Status: COMPLETED | OUTPATIENT
Start: 2019-05-24 | End: 2019-05-24

## 2019-05-24 RX ORDER — FUROSEMIDE 40 MG
40 TABLET ORAL
Refills: 0 | Status: DISCONTINUED | OUTPATIENT
Start: 2019-05-24 | End: 2019-05-27

## 2019-05-24 RX ORDER — CEFTRIAXONE 500 MG/1
INJECTION, POWDER, FOR SOLUTION INTRAMUSCULAR; INTRAVENOUS
Refills: 0 | Status: DISCONTINUED | OUTPATIENT
Start: 2019-05-24 | End: 2019-05-28

## 2019-05-24 RX ORDER — CEFTRIAXONE 500 MG/1
1 INJECTION, POWDER, FOR SOLUTION INTRAMUSCULAR; INTRAVENOUS EVERY 24 HOURS
Refills: 0 | Status: DISCONTINUED | OUTPATIENT
Start: 2019-05-25 | End: 2019-05-28

## 2019-05-24 RX ADMIN — SODIUM CHLORIDE 3 MILLILITER(S): 9 INJECTION INTRAMUSCULAR; INTRAVENOUS; SUBCUTANEOUS at 22:00

## 2019-05-24 RX ADMIN — Medication 650 MILLIGRAM(S): at 21:58

## 2019-05-24 RX ADMIN — CEFTRIAXONE 100 GRAM(S): 500 INJECTION, POWDER, FOR SOLUTION INTRAMUSCULAR; INTRAVENOUS at 10:55

## 2019-05-24 RX ADMIN — Medication 25 MILLIGRAM(S): at 17:09

## 2019-05-24 RX ADMIN — Medication 40 MILLIGRAM(S): at 10:54

## 2019-05-24 RX ADMIN — Medication 81 MILLIGRAM(S): at 11:34

## 2019-05-24 RX ADMIN — ATORVASTATIN CALCIUM 80 MILLIGRAM(S): 80 TABLET, FILM COATED ORAL at 21:39

## 2019-05-24 RX ADMIN — CHLORHEXIDINE GLUCONATE 1 APPLICATION(S): 213 SOLUTION TOPICAL at 05:08

## 2019-05-24 RX ADMIN — SODIUM CHLORIDE 3 MILLILITER(S): 9 INJECTION INTRAMUSCULAR; INTRAVENOUS; SUBCUTANEOUS at 05:10

## 2019-05-24 RX ADMIN — ENOXAPARIN SODIUM 40 MILLIGRAM(S): 100 INJECTION SUBCUTANEOUS at 11:35

## 2019-05-24 RX ADMIN — SODIUM CHLORIDE 3 MILLILITER(S): 9 INJECTION INTRAMUSCULAR; INTRAVENOUS; SUBCUTANEOUS at 13:09

## 2019-05-24 RX ADMIN — Medication 40 MILLIGRAM(S): at 17:10

## 2019-05-24 RX ADMIN — Medication 3 MILLILITER(S): at 13:26

## 2019-05-24 RX ADMIN — Medication 1 APPLICATION(S): at 17:10

## 2019-05-24 RX ADMIN — Medication 3 MILLILITER(S): at 04:41

## 2019-05-24 RX ADMIN — Medication 650 MILLIGRAM(S): at 21:38

## 2019-05-24 RX ADMIN — SODIUM CHLORIDE 3 MILLILITER(S): 9 INJECTION INTRAMUSCULAR; INTRAVENOUS; SUBCUTANEOUS at 05:09

## 2019-05-24 RX ADMIN — Medication 0.4 MILLIGRAM(S): at 06:23

## 2019-05-24 RX ADMIN — Medication 1 APPLICATION(S): at 05:12

## 2019-05-24 RX ADMIN — Medication 60 MILLIGRAM(S): at 05:09

## 2019-05-24 RX ADMIN — Medication 3 MILLILITER(S): at 19:44

## 2019-05-24 RX ADMIN — Medication 20 MILLIGRAM(S): at 05:08

## 2019-05-24 RX ADMIN — CITALOPRAM 30 MILLIGRAM(S): 10 TABLET, FILM COATED ORAL at 11:35

## 2019-05-24 RX ADMIN — Medication 25 MILLIGRAM(S): at 05:08

## 2019-05-24 RX ADMIN — Medication 0.5 MILLIGRAM(S): at 05:14

## 2019-05-24 RX ADMIN — Medication 3 MILLILITER(S): at 08:53

## 2019-05-24 RX ADMIN — PANTOPRAZOLE SODIUM 40 MILLIGRAM(S): 20 TABLET, DELAYED RELEASE ORAL at 06:22

## 2019-05-24 RX ADMIN — Medication 0.5 MILLIGRAM(S): at 21:38

## 2019-05-24 NOTE — CONSULT NOTE ADULT - ATTENDING COMMENTS
pt w/ cp w/ cough not c/w ischemia/infarct. pt w/ abnl cxr c/w element of failure. give lasix for neg balance. follow o2. pulm f/u. no acute mi. steroids in interim.

## 2019-05-24 NOTE — PHYSICAL THERAPY INITIAL EVALUATION ADULT - GENERAL OBSERVATIONS, REHAB EVAL
10:45-11:05 Chart reviewed. Pt encountered reclined in chair,  may be seen by Physical Therapist as confirmed with Nurse. Patient denied pain at rest but "gets out of breath"; +tele; +O2 via NC; +Primafit

## 2019-05-24 NOTE — PROGRESS NOTE ADULT - SUBJECTIVE AND OBJECTIVE BOX
patient now c/o dysuria with low grade temperature, more sob taday        T(F): 98.7 (05-24-19 @ 11:00), Max: 100.2 (05-24-19 @ 07:02)  HR: 85 (05-24-19 @ 10:50)  BP: 160/77 (05-24-19 @ 10:50)  RR: 29 (05-24-19 @ 11:00)  SpO2: 93% (05-24-19 @ 10:50) (84% - 100%)    PHYSICAL EXAM:  GENERAL: NAD  HEAD:  Atraumatic, Normocephalic  NERVOUS SYSTEM:  Alert & Oriented X3, no focal deficits  CHEST/LUNG: bilateral rales  HEART: Regular rate and rhythm; No murmurs, rubs, or gallops  ABDOMEN: Soft, Nontender, Nondistended; Bowel sounds present  EXTREMITIES:  b/l edema    LABS  05-24    144  |  107  |  27<H>  ----------------------------<  74  4.3   |  27  |  0.9    Ca    9.3      24 May 2019 05:30                            12.4   12.57 )-----------( 204      ( 24 May 2019 05:30 )             39.3         CARDIAC ENZYMES  Creatine Kinase, Serum: 127 (05-23 @ 15:49)  Creatine Kinase, Serum: 124 (05-23 @ 11:25)    CKMB Units: 7.7 (05-23 @ 15:49)  CKMB Units: 7.9 (05-23 @ 11:25)    Troponin T, Serum: <0.01 ng/mL (05-23-19 @ 15:49)  Troponin T, Serum: <0.01 ng/mL (05-23-19 @ 11:25)        RADIOLOGY  < from: Xray Chest 1 View- PORTABLE-Routine (05.23.19 @ 05:53) >  Impression:      Pulmonary vascular congestion. Parenchymal opacity pleural effusion     < end of copied text >    MEDICATIONS  (STANDING):  ALBUTerol/ipratropium for Nebulization 3 milliLiter(s) Nebulizer every 6 hours  aspirin  chewable 81 milliGRAM(s) Oral daily  atorvastatin 80 milliGRAM(s) Oral at bedtime  cefTRIAXone   IVPB      chlorhexidine 4% Liquid 1 Application(s) Topical <User Schedule>  citalopram 30 milliGRAM(s) Oral daily  enoxaparin Injectable 40 milliGRAM(s) SubCutaneous daily  furosemide   Injectable 40 milliGRAM(s) IV Push daily  metoprolol tartrate 25 milliGRAM(s) Oral two times a day  pantoprazole    Tablet 40 milliGRAM(s) Oral before breakfast  predniSONE   Tablet 60 milliGRAM(s) Oral daily  silver sulfADIAZINE 1% Cream 1 Application(s) Topical two times a day  sodium chloride 0.9% lock flush 3 milliLiter(s) IV Push every 8 hours    MEDICATIONS  (PRN):  acetaminophen   Tablet .. 650 milliGRAM(s) Oral every 6 hours PRN Temp greater or equal to 38C (100.4F)  ALBUTerol    90 MICROgram(s) HFA Inhaler 2 Puff(s) Inhalation every 6 hours PRN Bronchospasm  diphenhydrAMINE   Injectable 50 milliGRAM(s) IV Push every 8 hours PRN Allergy symptoms  LORazepam     Tablet 0.5 milliGRAM(s) Oral two times a day PRN Anxiety  nitroglycerin     SubLingual 0.4 milliGRAM(s) SubLingual every 5 minutes PRN Chest Pain

## 2019-05-24 NOTE — PROGRESS NOTE ADULT - ASSESSMENT
Impression    Anaphylaxis: now resolved  Acute hypoxic respiratory failure R/O CHF, concern for PE/PNA  COPD  HTN  former smoker    Plan    CNS: no sedation   d/c gabapentin and lyrica due to concern for anaphylaxis    HEENT: oral care     PULMONARY: keep sat>92 %   c/w  po prednisone.  CTA PE protocol  AVAP at night and prn  nebs q4h    CARDIOVASCULAR:   cxr revealing pulmonary vascular congestion  keep I<O; IV lasix 40 mg now and daily  serial trops: negative, ecg: nonspecific st changes  2d echo: no wall motion abnormality. ef >60%, impaired relaxation  cards following    GI: GI prophylaxis.   on dysphagia 1 pureed diet    RENAL: follow lytes     INFECTIOUS DISEASE: started on iv rocephin as pt spiked fever and is having urinary symptoms.  check urinalysis     HEMATOLOGICAL:  DVT prophylaxis.    ENDOCRINE:  Follow up FS.  Insulin protocol if needed.    Musculoskeletal: Out of bed to chair

## 2019-05-24 NOTE — PROGRESS NOTE ADULT - ASSESSMENT
IMPRESSION:  anaphylaxis shock   asthma   FLAVIO  SOb hypoxia Acute resp failure ?? CHF   PLAN:    CNS: no sedation   consider hold gabapentin can cause anaphylaxis   HEENT: oral care     PULMONARY: keep pox . 92 %   prednisone 60 mg and taper   stat ct of chest will do PE protocol   Bipap  machine at night as needed   nebulizer Q4 hrs    CARDIOVASCULAR: keep Is <  os   give lasix 40 mg now stat   follow cardiology     GI: GI prophylaxis.  feeding     RENAL: follow lytes     INFECTIOUS DISEASE: no abx for now     HEMATOLOGICAL:  DVT prophylaxis.    ENDOCRINE:  Follow up FS.  Insulin protocol if needed.    MUSCULOSKELETAL:    ct of chest now         CRITICAL CARE TIME SPENT: ***

## 2019-05-24 NOTE — PROGRESS NOTE ADULT - PROBLEM SELECTOR PLAN 2
continue current meds  out patient pulmonary follow up
continue current meds  out patient pulmonary follow up

## 2019-05-24 NOTE — PHYSICAL THERAPY INITIAL EVALUATION ADULT - STANDING BALANCE: DYNAMIC, REHAB EVAL
Unable to perform secondary to patient easily gets short of breath even while on Oxygen at   5 Li/min  via nasal cannula

## 2019-05-24 NOTE — PROGRESS NOTE ADULT - SUBJECTIVE AND OBJECTIVE BOX
SUBJECTIVE:    Patient is a 74y old Female who presents with a chief complaint of allergic reaction (24 May 2019 09:18)  Currently admitted to medicine with the primary diagnosis of Anaphylaxis.  Overnight events: she was c/o left sided chest pain, multiple ecgs were done with non specific st changes , also had 2sets of trops : negative.   This morning pt was hypoxic to 84 on 4L NC  and was started on AVAP with improvement. She also spiked fever 101f rectally and is complaining of burning urination and left sided chest pain.    PAST MEDICAL & SURGICAL HISTORY  PVD (peripheral vascular disease)  Other emphysema  Other cardiomyopathy  TIA (transient ischemic attack)  COPD (chronic obstructive pulmonary disease)  Depression  Hypertension  History of cholecystectomy    ALLERGIES:  codeine (Other (Moderate))  Depakote (Unknown)  losartan (Angioedema)  verapamil (Short breath; Angioedema)    MEDICATIONS:  STANDING MEDICATIONS  ALBUTerol/ipratropium for Nebulization 3 milliLiter(s) Nebulizer every 6 hours  aspirin  chewable 81 milliGRAM(s) Oral daily  atorvastatin 80 milliGRAM(s) Oral at bedtime  cefTRIAXone   IVPB      chlorhexidine 4% Liquid 1 Application(s) Topical <User Schedule>  citalopram 30 milliGRAM(s) Oral daily  enoxaparin Injectable 40 milliGRAM(s) SubCutaneous daily  furosemide   Injectable 40 milliGRAM(s) IV Push daily  metoprolol tartrate 25 milliGRAM(s) Oral two times a day  pantoprazole    Tablet 40 milliGRAM(s) Oral before breakfast  predniSONE   Tablet 60 milliGRAM(s) Oral daily  silver sulfADIAZINE 1% Cream 1 Application(s) Topical two times a day  sodium chloride 0.9% lock flush 3 milliLiter(s) IV Push every 8 hours    PRN MEDICATIONS  ALBUTerol    90 MICROgram(s) HFA Inhaler 2 Puff(s) Inhalation every 6 hours PRN  diphenhydrAMINE   Injectable 50 milliGRAM(s) IV Push every 8 hours PRN  LORazepam     Tablet 0.5 milliGRAM(s) Oral two times a day PRN  nitroglycerin     SubLingual 0.4 milliGRAM(s) SubLingual every 5 minutes PRN    VITALS:   T(F): 98.7  HR: 84  BP: 150/66  RR: 29  SpO2: 91%    LABS:                        12.4   12.57 )-----------( 204      ( 24 May 2019 05:30 )             39.3     05-24    144  |  107  |  27<H>  ----------------------------<  74  4.3   |  27  |  0.9    Ca    9.3      24 May 2019 05:30            Creatine Kinase, Serum: 127 U/L (05-23-19 @ 15:49)  Troponin T, Serum: <0.01 ng/mL (05-23-19 @ 15:49)      CARDIAC MARKERS ( 23 May 2019 15:49 )  x     / <0.01 ng/mL / 127 U/L / x     / 7.7 ng/mL  CARDIAC MARKERS ( 23 May 2019 11:25 )  x     / <0.01 ng/mL / 124 U/L / x     / 7.9 ng/mL      RADIOLOGY:    < from: Xray Chest 1 View- PORTABLE-Routine (05.23.19 @ 05:53) >  Pulmonary vascular congestion. Parenchymal opacity pleural effusion or   air leak    < end of copied text >    PHYSICAL EXAM:  GEN: In mild respiratory distress on AVAP.   LUNGS: B/l crackles   HEART: S1/S2 present. RRR.   ABD: Soft, non-tender, non-distended. Bowel sounds present  EXT: NC/NC/NE/2+PP/BRADY  NEURO: AAOX3

## 2019-05-24 NOTE — PROGRESS NOTE ADULT - PROBLEM SELECTOR PLAN 3
monitor on tele  check repeat cardiac enzymes  check echo  cardiology consult
monitor on tele  check repeat cardiac enzymes  check echo  cardiology consult

## 2019-05-24 NOTE — PROGRESS NOTE ADULT - PROBLEM SELECTOR PLAN 1
improved  continue steroid taper  epi pen available at all times as out patient  follow up with Dr Encarnacion as out patient
improved  continue steroid taper  epi pen available at all times as out patient  follow up with Dr Encarnacion as out patient

## 2019-05-24 NOTE — CONSULT NOTE ADULT - SUBJECTIVE AND OBJECTIVE BOX
Patient is a 74y old  Female who presents with a chief complaint of allergic reaction (24 May 2019 09:06)      HPI:  74yF HTN COPD depression TIA p/w anaphylaxis.    HISTORY OF angioedema secondary to aerosol toxin (polyurothane as per patient ) APRIL 2019    Pt w/ hx anaphylaxis to antihypertensive (combination losartan-HCTZ) that required epi in the past (reportedly with quick decompensation, nearly intubated).  This time, pt reports no new medication, food, detergent, lotion, soap or other exposure.     Did not eat lunch or dinner after eating a late breakfast.    C/o tongue swelling, SOB and trouble swallowing similar to prior - started after dinner tonight - called EMS who gave her 0.3mg epi on scene prior to ED arrival.   reported persistent tongue swelling and voice change and pt intermittently hyperventilating about how it felt to swallow.  No drooling or stridor.    2ND DOSE OF EPI GIVEN IN OUR ER. (22 May 2019 01:45)      PAST MEDICAL & SURGICAL HISTORY:  PVD (peripheral vascular disease)  Other emphysema  Other cardiomyopathy  TIA (transient ischemic attack)  COPD (chronic obstructive pulmonary disease)  Depression  Hypertension  History of cholecystectomy                      PREVIOUS DIAGNOSTIC TESTING:      ECHO  FINDINGS:    STRESS  FINDINGS:    CATHETERIZATION  FINDINGS:    MEDICATIONS  (STANDING):  ALBUTerol/ipratropium for Nebulization 3 milliLiter(s) Nebulizer every 6 hours  aspirin  chewable 81 milliGRAM(s) Oral daily  atorvastatin 80 milliGRAM(s) Oral at bedtime  chlorhexidine 4% Liquid 1 Application(s) Topical <User Schedule>  citalopram 30 milliGRAM(s) Oral daily  enoxaparin Injectable 40 milliGRAM(s) SubCutaneous daily  furosemide    Tablet 20 milliGRAM(s) Oral daily  furosemide   Injectable 40 milliGRAM(s) IV Push daily  metoprolol tartrate 25 milliGRAM(s) Oral two times a day  pantoprazole    Tablet 40 milliGRAM(s) Oral before breakfast  predniSONE   Tablet 60 milliGRAM(s) Oral daily  pregabalin 25 milliGRAM(s) Oral daily  silver sulfADIAZINE 1% Cream 1 Application(s) Topical two times a day  sodium chloride 0.9% lock flush 3 milliLiter(s) IV Push every 8 hours    MEDICATIONS  (PRN):  ALBUTerol    90 MICROgram(s) HFA Inhaler 2 Puff(s) Inhalation every 6 hours PRN Bronchospasm  diphenhydrAMINE   Injectable 50 milliGRAM(s) IV Push every 8 hours PRN Allergy symptoms  LORazepam     Tablet 0.5 milliGRAM(s) Oral two times a day PRN Anxiety  nitroglycerin     SubLingual 0.4 milliGRAM(s) SubLingual every 5 minutes PRN Chest Pain      FAMILY HISTORY:  No pertinent family history in first degree relatives      SOCIAL HISTORY:    CIGARETTES:    ALCOHOL:        Vital Signs Last 24 Hrs  T(C): 37.9 (24 May 2019 07:02), Max: 37.9 (24 May 2019 07:02)  T(F): 100.2 (24 May 2019 07:02), Max: 100.2 (24 May 2019 07:02)  HR: 92 (24 May 2019 07:30) (75 - 97)  BP: 199/95 (24 May 2019 07:02) (100/56 - 199/95)  BP(mean): 137 (24 May 2019 07:02) (72 - 137)  RR: 30 (24 May 2019 07:30) (15 - 57)  SpO2: 97% (24 May 2019 07:30) (84% - 100%)            INTERPRETATION OF TELEMETRY:    ECG:    I&O's Detail      LABS:                        12.4   12.57 )-----------( 204      ( 24 May 2019 05:30 )             39.3     05-24    144  |  107  |  27<H>  ----------------------------<  74  4.3   |  27  |  0.9    Ca    9.3      24 May 2019 05:30      CARDIAC MARKERS ( 23 May 2019 15:49 )  x     / <0.01 ng/mL / 127 U/L / x     / 7.7 ng/mL  CARDIAC MARKERS ( 23 May 2019 11:25 )  x     / <0.01 ng/mL / 124 U/L / x     / 7.9 ng/mL          I&O's Summary    BNP  RADIOLOGY & ADDITIONAL STUDIES:

## 2019-05-24 NOTE — PHYSICAL THERAPY INITIAL EVALUATION ADULT - LEVEL OF INDEPENDENCE: SIT/STAND, REHAB EVAL
unable to perform/secondary to patient easily gets short of breath even while on Oxygen at   5 Li/min  via nasal cannula

## 2019-05-24 NOTE — PROGRESS NOTE ADULT - SUBJECTIVE AND OBJECTIVE BOX
Patient is a 74y old  Female who presents with a chief complaint of allergic reaction (23 May 2019 14:55)      Over Night Events:  Patient seen and examined complaining of worsening SOb ?/ CP   require more oxygen       ROS:  See HPI    PHYSICAL EXAM    ICU Vital Signs Last 24 Hrs  T(C): 37.9 (24 May 2019 07:02), Max: 37.9 (24 May 2019 07:02)  T(F): 100.2 (24 May 2019 07:02), Max: 100.2 (24 May 2019 07:02)  HR: 92 (24 May 2019 07:30) (75 - 97)  BP: 199/95 (24 May 2019 07:02) (100/56 - 199/95)  BP(mean): 137 (24 May 2019 07:02) (72 - 137)  ABP: --  ABP(mean): --  RR: 30 (24 May 2019 07:30) (15 - 57)  SpO2: 97% (24 May 2019 07:30) (84% - 100%)      General: Aox3  HEENT:      miesha          Lymph Nodes: NO cervical LN   Lungs: Bilateral crackles   Cardiovascular: Regular   Abdomen: Soft, Positive BS  Extremities: No clubbing   Skin: warm   Neurological:  no focal deficit   Musculoskeletal: move all ext     I&O's Detail      LABS:                          12.4   12.57 )-----------( 204      ( 24 May 2019 05:30 )             39.3         24 May 2019 05:30    144    |  107    |  27     ----------------------------<  74     4.3     |  27     |  0.9      Ca    9.3        24 May 2019 05:30                                                                                            CARDIAC MARKERS ( 23 May 2019 15:49 )  x     / <0.01 ng/mL / 127 U/L / x     / 7.7 ng/mL  CARDIAC MARKERS ( 23 May 2019 11:25 )  x     / <0.01 ng/mL / 124 U/L / x     / 7.9 ng/mL                                                                                                                                             MEDICATIONS  (STANDING):  ALBUTerol/ipratropium for Nebulization 3 milliLiter(s) Nebulizer every 6 hours  aspirin  chewable 81 milliGRAM(s) Oral daily  atorvastatin 80 milliGRAM(s) Oral at bedtime  chlorhexidine 4% Liquid 1 Application(s) Topical <User Schedule>  citalopram 30 milliGRAM(s) Oral daily  enoxaparin Injectable 40 milliGRAM(s) SubCutaneous daily  furosemide    Tablet 20 milliGRAM(s) Oral daily  metoprolol tartrate 25 milliGRAM(s) Oral two times a day  pantoprazole    Tablet 40 milliGRAM(s) Oral before breakfast  predniSONE   Tablet 60 milliGRAM(s) Oral daily  pregabalin 25 milliGRAM(s) Oral daily  silver sulfADIAZINE 1% Cream 1 Application(s) Topical two times a day  sodium chloride 0.9% lock flush 3 milliLiter(s) IV Push every 8 hours    MEDICATIONS  (PRN):  ALBUTerol    90 MICROgram(s) HFA Inhaler 2 Puff(s) Inhalation every 6 hours PRN Bronchospasm  diphenhydrAMINE   Injectable 50 milliGRAM(s) IV Push every 8 hours PRN Allergy symptoms  LORazepam     Tablet 0.5 milliGRAM(s) Oral two times a day PRN Anxiety  nitroglycerin     SubLingual 0.4 milliGRAM(s) SubLingual every 5 minutes PRN Chest Pain          Xrays:  TLC:  OG:  ET tube:                                                                                    worsening b/l effusion    ECHO:  [facility] ICU:

## 2019-05-25 LAB
ALBUMIN SERPL ELPH-MCNC: 3.7 G/DL — SIGNIFICANT CHANGE UP (ref 3.5–5.2)
ALP SERPL-CCNC: 79 U/L — SIGNIFICANT CHANGE UP (ref 30–115)
ALT FLD-CCNC: 15 U/L — SIGNIFICANT CHANGE UP (ref 0–41)
ANION GAP SERPL CALC-SCNC: 12 MMOL/L — SIGNIFICANT CHANGE UP (ref 7–14)
AST SERPL-CCNC: 17 U/L — SIGNIFICANT CHANGE UP (ref 0–41)
BILIRUB SERPL-MCNC: 0.6 MG/DL — SIGNIFICANT CHANGE UP (ref 0.2–1.2)
BUN SERPL-MCNC: 32 MG/DL — HIGH (ref 10–20)
CALCIUM SERPL-MCNC: 8.7 MG/DL — SIGNIFICANT CHANGE UP (ref 8.5–10.1)
CHLORIDE SERPL-SCNC: 101 MMOL/L — SIGNIFICANT CHANGE UP (ref 98–110)
CO2 SERPL-SCNC: 29 MMOL/L — SIGNIFICANT CHANGE UP (ref 17–32)
CREAT SERPL-MCNC: 1.2 MG/DL — SIGNIFICANT CHANGE UP (ref 0.7–1.5)
GLUCOSE SERPL-MCNC: 81 MG/DL — SIGNIFICANT CHANGE UP (ref 70–99)
HCT VFR BLD CALC: 37 % — SIGNIFICANT CHANGE UP (ref 37–47)
HGB BLD-MCNC: 11.8 G/DL — LOW (ref 12–16)
MCHC RBC-ENTMCNC: 28.9 PG — SIGNIFICANT CHANGE UP (ref 27–31)
MCHC RBC-ENTMCNC: 31.9 G/DL — LOW (ref 32–37)
MCV RBC AUTO: 90.7 FL — SIGNIFICANT CHANGE UP (ref 81–99)
NRBC # BLD: 0 /100 WBCS — SIGNIFICANT CHANGE UP (ref 0–0)
PLATELET # BLD AUTO: 217 K/UL — SIGNIFICANT CHANGE UP (ref 130–400)
POTASSIUM SERPL-MCNC: 3.1 MMOL/L — LOW (ref 3.5–5)
POTASSIUM SERPL-SCNC: 3.1 MMOL/L — LOW (ref 3.5–5)
PROT SERPL-MCNC: 5.8 G/DL — LOW (ref 6–8)
RBC # BLD: 4.08 M/UL — LOW (ref 4.2–5.4)
RBC # FLD: 15.4 % — HIGH (ref 11.5–14.5)
SODIUM SERPL-SCNC: 142 MMOL/L — SIGNIFICANT CHANGE UP (ref 135–146)
WBC # BLD: 7.92 K/UL — SIGNIFICANT CHANGE UP (ref 4.8–10.8)
WBC # FLD AUTO: 7.92 K/UL — SIGNIFICANT CHANGE UP (ref 4.8–10.8)

## 2019-05-25 PROCEDURE — 71045 X-RAY EXAM CHEST 1 VIEW: CPT | Mod: 26

## 2019-05-25 RX ORDER — POTASSIUM CHLORIDE 20 MEQ
20 PACKET (EA) ORAL
Refills: 0 | Status: DISCONTINUED | OUTPATIENT
Start: 2019-05-25 | End: 2019-05-25

## 2019-05-25 RX ORDER — POTASSIUM CHLORIDE 20 MEQ
40 PACKET (EA) ORAL ONCE
Refills: 0 | Status: COMPLETED | OUTPATIENT
Start: 2019-05-25 | End: 2019-05-25

## 2019-05-25 RX ADMIN — Medication 650 MILLIGRAM(S): at 05:36

## 2019-05-25 RX ADMIN — Medication 0.5 MILLIGRAM(S): at 22:00

## 2019-05-25 RX ADMIN — ATORVASTATIN CALCIUM 80 MILLIGRAM(S): 80 TABLET, FILM COATED ORAL at 21:02

## 2019-05-25 RX ADMIN — Medication 650 MILLIGRAM(S): at 16:02

## 2019-05-25 RX ADMIN — Medication 40 MILLIGRAM(S): at 05:16

## 2019-05-25 RX ADMIN — SODIUM CHLORIDE 3 MILLILITER(S): 9 INJECTION INTRAMUSCULAR; INTRAVENOUS; SUBCUTANEOUS at 05:18

## 2019-05-25 RX ADMIN — Medication 650 MILLIGRAM(S): at 05:16

## 2019-05-25 RX ADMIN — Medication 1 APPLICATION(S): at 17:13

## 2019-05-25 RX ADMIN — CITALOPRAM 30 MILLIGRAM(S): 10 TABLET, FILM COATED ORAL at 11:10

## 2019-05-25 RX ADMIN — CHLORHEXIDINE GLUCONATE 1 APPLICATION(S): 213 SOLUTION TOPICAL at 05:15

## 2019-05-25 RX ADMIN — SODIUM CHLORIDE 3 MILLILITER(S): 9 INJECTION INTRAMUSCULAR; INTRAVENOUS; SUBCUTANEOUS at 14:17

## 2019-05-25 RX ADMIN — Medication 1 APPLICATION(S): at 05:20

## 2019-05-25 RX ADMIN — Medication 50 MILLIGRAM(S): at 17:41

## 2019-05-25 RX ADMIN — SODIUM CHLORIDE 3 MILLILITER(S): 9 INJECTION INTRAMUSCULAR; INTRAVENOUS; SUBCUTANEOUS at 21:02

## 2019-05-25 RX ADMIN — Medication 40 MILLIEQUIVALENT(S): at 12:37

## 2019-05-25 RX ADMIN — Medication 25 MILLIGRAM(S): at 17:41

## 2019-05-25 RX ADMIN — Medication 60 MILLIGRAM(S): at 05:18

## 2019-05-25 RX ADMIN — Medication 40 MILLIGRAM(S): at 17:41

## 2019-05-25 RX ADMIN — PANTOPRAZOLE SODIUM 40 MILLIGRAM(S): 20 TABLET, DELAYED RELEASE ORAL at 06:03

## 2019-05-25 RX ADMIN — Medication 25 MILLIGRAM(S): at 05:17

## 2019-05-25 RX ADMIN — Medication 81 MILLIGRAM(S): at 11:11

## 2019-05-25 RX ADMIN — CEFTRIAXONE 100 GRAM(S): 500 INJECTION, POWDER, FOR SOLUTION INTRAMUSCULAR; INTRAVENOUS at 09:45

## 2019-05-25 RX ADMIN — Medication 0.5 MILLIGRAM(S): at 05:16

## 2019-05-25 RX ADMIN — Medication 50 MILLIEQUIVALENT(S): at 11:32

## 2019-05-25 RX ADMIN — Medication 3 MILLILITER(S): at 13:30

## 2019-05-25 RX ADMIN — Medication 3 MILLILITER(S): at 07:49

## 2019-05-25 RX ADMIN — ENOXAPARIN SODIUM 40 MILLIGRAM(S): 100 INJECTION SUBCUTANEOUS at 11:10

## 2019-05-25 NOTE — PROGRESS NOTE ADULT - ASSESSMENT
IMPRESSION:  anaphylaxis shock   asthma   FLAVIO  SOb hypoxia Acute resp failure c/w CHF   PLAN:    CNS: no sedation   consider hold gabapentin can cause anaphylaxis     HEENT: oral care     PULMONARY: keep pox . 92 %   prednisone 60 mg and taper   NIPPV at night as needed   nebulizer Q4 hrs  trial off NIPPV later this AM    CARDIOVASCULAR: keep Is <  os   give lasix as needed   follow cardiology     GI: GI prophylaxis.  feeding when off NIPPV     RENAL: follow lytes     INFECTIOUS DISEASE: no abx for now     HEMATOLOGICAL:  DVT prophylaxis.    ENDOCRINE:  Follow up FS.  Insulin protocol if needed.    MUSCULOSKELETAL:

## 2019-05-25 NOTE — PROGRESS NOTE ADULT - SUBJECTIVE AND OBJECTIVE BOX
Patient is a 74y old  Female who presents with a chief complaint of allergic reaction (24 May 2019 13:09)        Interval Events: No overnight events. Needed to back on NIPPV this AM    REVIEW OF SYSTEMS:  Constitutional: No fevers or chills. No weight loss. No fatigue or generalized malaise.  Eyes: No itching or discharge from the eyes  ENT: No ear pain. No ear discharge. No nasal congestion. No post nasal drip. No epistaxis. No throat pain. No sore throat. No difficulty swallowing.   CV: No chest pain. No palpitations. No lightheadedness or dizziness.   Resp: +dyspnea at rest. +dyspnea on exertion. No orthopnea. No wheezing. No cough. No stridor. No sputum production. No chest pain with respiration.  GI: No nausea. No vomiting. No diarrhea.  MSK: No joint pain or pain in any extremities  Integumentary: No skin lesions. No pedal edema.  Neurological: No gross motor weakness. No sensory changes.      OBJECTIVE:  ICU Vital Signs Last 24 Hrs  T(C): 35.7 (25 May 2019 03:07), Max: 37.1 (24 May 2019 11:00)  T(F): 96.2 (25 May 2019 03:07), Max: 98.7 (24 May 2019 11:00)  HR: 74 (25 May 2019 05:09) (55 - 92)  BP: 137/71 (25 May 2019 05:09) (90/50 - 178/82)  BP(mean): 95 (25 May 2019 05:09) (68 - 118)    RR: 32 (25 May 2019 05:09) (13 - 38)  SpO2: 96% (25 May 2019 05:09) (90% - 98%)         @ 07:01  -   @ 07:00  --------------------------------------------------------  IN: 890 mL / OUT: 3550 mL / NET: -2660 mL      CAPILLARY BLOOD GLUCOSE          PHYSICAL EXAM:  General: Awake, alert, oriented X 3.   HEENT: Atraumatic, normocephalic.                 Mallampatti Grade                 No nasal congestion.                No tonsillar or pharyngeal exudates.  Lymph Nodes: No palpable lymphadenopathy neck, supraclavicular region  Neck: No JVD. No carotid bruit, no thyromegally  Respiratory: Normal chest expansion                         Normal percussion                         Normal and equal air entry                         ++ rales.  Cardiovascular: S1 S2 normal. No murmurs, rubs or gallops.   Abdomen: Soft, non-tender, non-distended. No organomegaly.  Extremities: Warm to touch. Peripheral pulse palpable. No pedal edema.   Skin: No rashes or skin lesions, warm dry  Neurological: Motor and sensory examination equal and normal in all four extremities.  Psychiatry: Appropriate mood and affect.    HOSPITAL MEDICATIONS:  MEDICATIONS  (STANDING):  ALBUTerol/ipratropium for Nebulization 3 milliLiter(s) Nebulizer every 6 hours  aspirin  chewable 81 milliGRAM(s) Oral daily  atorvastatin 80 milliGRAM(s) Oral at bedtime  cefTRIAXone   IVPB 1 Gram(s) IV Intermittent every 24 hours  cefTRIAXone   IVPB      chlorhexidine 4% Liquid 1 Application(s) Topical <User Schedule>  citalopram 30 milliGRAM(s) Oral daily  enoxaparin Injectable 40 milliGRAM(s) SubCutaneous daily  furosemide   Injectable 40 milliGRAM(s) IV Push two times a day  metoprolol tartrate 25 milliGRAM(s) Oral two times a day  pantoprazole    Tablet 40 milliGRAM(s) Oral before breakfast  predniSONE   Tablet 60 milliGRAM(s) Oral daily  silver sulfADIAZINE 1% Cream 1 Application(s) Topical two times a day  sodium chloride 0.9% lock flush 3 milliLiter(s) IV Push every 8 hours    MEDICATIONS  (PRN):  acetaminophen   Tablet .. 650 milliGRAM(s) Oral every 6 hours PRN Temp greater or equal to 38C (100.4F)  ALBUTerol    90 MICROgram(s) HFA Inhaler 2 Puff(s) Inhalation every 6 hours PRN Bronchospasm  diphenhydrAMINE   Injectable 50 milliGRAM(s) IV Push every 8 hours PRN Allergy symptoms  LORazepam     Tablet 0.5 milliGRAM(s) Oral two times a day PRN Anxiety  nitroglycerin     SubLingual 0.4 milliGRAM(s) SubLingual every 5 minutes PRN Chest Pain      LABS:                        11.8   7.92  )-----------( 217      ( 25 May 2019 05:56 )             37.0     05-24    144  |  107  |  27<H>  ----------------------------<  74  4.3   |  27  |  0.9    Ca    9.3      24 May 2019 05:30        Urinalysis Basic - ( 24 May 2019 12:00 )    Color: Yellow / Appearance: Clear / S.010 / pH: x  Gluc: x / Ketone: Negative  / Bili: Negative / Urobili: 0.2 mg/dL   Blood: x / Protein: Negative mg/dL / Nitrite: Positive   Leuk Esterase: Moderate / RBC: x / WBC 6-10 /HPF   Sq Epi: x / Non Sq Epi: Few /HPF / Bacteria: Moderate                    RADIOLOGY: Radiology personally reviewed. Improved congestion but not normal yet.

## 2019-05-25 NOTE — PROGRESS NOTE ADULT - SUBJECTIVE AND OBJECTIVE BOX
Chief Complaint:  Patient is a 74y old  Female who presents with a chief complaint of allergic reaction (25 May 2019 08:54)      Interval Events:     Allergies:  codeine (Other (Moderate))  Depakote (Unknown)  losartan (Angioedema)  verapamil (Short breath; Angioedema)      Home Medications:    Hospital Medications:  acetaminophen   Tablet .. 650 milliGRAM(s) Oral every 6 hours PRN  ALBUTerol    90 MICROgram(s) HFA Inhaler 2 Puff(s) Inhalation every 6 hours PRN  ALBUTerol/ipratropium for Nebulization 3 milliLiter(s) Nebulizer every 6 hours  aspirin  chewable 81 milliGRAM(s) Oral daily  atorvastatin 80 milliGRAM(s) Oral at bedtime  cefTRIAXone   IVPB 1 Gram(s) IV Intermittent every 24 hours  cefTRIAXone   IVPB      chlorhexidine 4% Liquid 1 Application(s) Topical <User Schedule>  citalopram 30 milliGRAM(s) Oral daily  diphenhydrAMINE   Injectable 50 milliGRAM(s) IV Push every 8 hours PRN  enoxaparin Injectable 40 milliGRAM(s) SubCutaneous daily  furosemide   Injectable 40 milliGRAM(s) IV Push two times a day  LORazepam     Tablet 0.5 milliGRAM(s) Oral two times a day PRN  metoprolol tartrate 25 milliGRAM(s) Oral two times a day  nitroglycerin     SubLingual 0.4 milliGRAM(s) SubLingual every 5 minutes PRN  pantoprazole    Tablet 40 milliGRAM(s) Oral before breakfast  predniSONE   Tablet 60 milliGRAM(s) Oral daily  silver sulfADIAZINE 1% Cream 1 Application(s) Topical two times a day  sodium chloride 0.9% lock flush 3 milliLiter(s) IV Push every 8 hours      PMHX/PSHX:  PVD (peripheral vascular disease)  Other emphysema  Other cardiomyopathy  TIA (transient ischemic attack)  COPD (chronic obstructive pulmonary disease)  Depression  Hypertension  History of cholecystectomy      Family history:  No pertinent family history in first degree relatives      ROS:   As mentioned below      PHYSICAL EXAM:   Vital Signs:  Vital Signs Last 24 Hrs  T(C): 36.3 (25 May 2019 07:10), Max: 37.1 (24 May 2019 11:00)  T(F): 97.3 (25 May 2019 07:10), Max: 98.7 (24 May 2019 11:00)  HR: 73 (25 May 2019 08:47) (54 - 88)  BP: 114/55 (25 May 2019 08:47) (90/50 - 160/77)  BP(mean): 79 (25 May 2019 08:47) (68 - 111)  RR: 35 (25 May 2019 08:47) (13 - 38)  SpO2: 94% (25 May 2019 08:47) (90% - 98%)  Daily     Daily Weight in k.4 (25 May 2019 07:10)    GENERAL:  NAD  HEENT:  NC/AT,  No Thyromegaly  CHEST:  CTA B/L  HEART:  S1, S2- No M, R, G  ABDOMEN:  Soft, NT, ND  EXTEREMITIES:  no cyanosis,clubbing or edema  SKIN:  NAD  NEURO:  Grossly Nr.    LABS:                        11.8   7.92  )-----------( 217      ( 25 May 2019 05:56 )             37.0     05-    142  |  101  |  32<H>  ----------------------------<  81  3.1<L>   |  29  |  1.2    Ca    8.7      25 May 2019 05:56    TPro  5.8<L>  /  Alb  3.7  /  TBili  0.6  /  DBili  x   /  AST  17  /  ALT  15  /  AlkPhos  79  05-25    LIVER FUNCTIONS - ( 25 May 2019 05:56 )  Alb: 3.7 g/dL / Pro: 5.8 g/dL / ALK PHOS: 79 U/L / ALT: 15 U/L / AST: 17 U/L / GGT: x             Urinalysis Basic - ( 24 May 2019 12:00 )    Color: Yellow / Appearance: Clear / S.010 / pH: x  Gluc: x / Ketone: Negative  / Bili: Negative / Urobili: 0.2 mg/dL   Blood: x / Protein: Negative mg/dL / Nitrite: Positive   Leuk Esterase: Moderate / RBC: x / WBC 6-10 /HPF   Sq Epi: x / Non Sq Epi: Few /HPF / Bacteria: Moderate          Imaging:

## 2019-05-25 NOTE — PROGRESS NOTE ADULT - SUBJECTIVE AND OBJECTIVE BOX
SUBJ:No chest pain or shortness of breath      MEDICATIONS  (STANDING):  ALBUTerol/ipratropium for Nebulization 3 milliLiter(s) Nebulizer every 6 hours  aspirin  chewable 81 milliGRAM(s) Oral daily  atorvastatin 80 milliGRAM(s) Oral at bedtime  cefTRIAXone   IVPB 1 Gram(s) IV Intermittent every 24 hours  cefTRIAXone   IVPB      chlorhexidine 4% Liquid 1 Application(s) Topical <User Schedule>  citalopram 30 milliGRAM(s) Oral daily  enoxaparin Injectable 40 milliGRAM(s) SubCutaneous daily  furosemide   Injectable 40 milliGRAM(s) IV Push two times a day  metoprolol tartrate 25 milliGRAM(s) Oral two times a day  pantoprazole    Tablet 40 milliGRAM(s) Oral before breakfast  predniSONE   Tablet 60 milliGRAM(s) Oral daily  silver sulfADIAZINE 1% Cream 1 Application(s) Topical two times a day  sodium chloride 0.9% lock flush 3 milliLiter(s) IV Push every 8 hours    MEDICATIONS  (PRN):  acetaminophen   Tablet .. 650 milliGRAM(s) Oral every 6 hours PRN Temp greater or equal to 38C (100.4F)  ALBUTerol    90 MICROgram(s) HFA Inhaler 2 Puff(s) Inhalation every 6 hours PRN Bronchospasm  diphenhydrAMINE   Injectable 50 milliGRAM(s) IV Push every 8 hours PRN Allergy symptoms  LORazepam     Tablet 0.5 milliGRAM(s) Oral two times a day PRN Anxiety  nitroglycerin     SubLingual 0.4 milliGRAM(s) SubLingual every 5 minutes PRN Chest Pain            Vital Signs Last 24 Hrs  T(C): 36.3 (25 May 2019 07:10), Max: 37.1 (24 May 2019 11:00)  T(F): 97.3 (25 May 2019 07:10), Max: 98.7 (24 May 2019 11:00)  HR: 54 (25 May 2019 07:09) (54 - 88)  BP: 117/63 (25 May 2019 07:09) (90/50 - 160/77)  BP(mean): 84 (25 May 2019 07:09) (68 - 111)  RR: 20 (25 May 2019 07:10) (13 - 38)  SpO2: 98% (25 May 2019 07:09) (90% - 98%)      ECG:NML    TTE:    LABS:                        11.8   7.92  )-----------( 217      ( 25 May 2019 05:56 )             37.0     05-25    142  |  101  |  32<H>  ----------------------------<  81  3.1<L>   |  29  |  1.2    Ca    8.7      25 May 2019 05:56    TPro  5.8<L>  /  Alb  3.7  /  TBili  0.6  /  DBili  x   /  AST  17  /  ALT  15  /  AlkPhos  79  05-25    CARDIAC MARKERS ( 23 May 2019 15:49 )  x     / <0.01 ng/mL / 127 U/L / x     / 7.7 ng/mL  CARDIAC MARKERS ( 23 May 2019 11:25 )  x     / <0.01 ng/mL / 124 U/L / x     / 7.9 ng/mL          I&O's Summary    24 May 2019 07:01  -  25 May 2019 07:00  --------------------------------------------------------  IN: 890 mL / OUT: 3550 mL / NET: -2660 mL    25 May 2019 07:01  -  25 May 2019 08:55  --------------------------------------------------------  IN: 240 mL / OUT: 1000 mL / NET: -760 mL      BNPSerum Pro-Brain Natriuretic Peptide: 4946 pg/mL (05-24 @ 15:40)

## 2019-05-25 NOTE — PROGRESS NOTE ADULT - ASSESSMENT
Anaphylaxis: now resolved  Acute hypoxic respiratory failure R/O CHF, concern for PE/PNA  COPD  HTN  former smoker    Plan    CNS: no sedation   d/c gabapentin and lyrica due to concern for anaphylaxis    HEENT: oral care     PULMONARY: keep sat>92 %   c/w  po prednisone.  AVAP at night and prn    CARDIOVASCULAR:   keep I<O; IV lasix 40 mg   ecg: nonspecific st changes  2d echo: no wall motion abnormality.    GI: GI prophylaxis.   on dysphagia 1 pureed diet    RENAL: follow lytes     INFECTIOUS DISEASE: IV Rocephin- Awaiting Ucx    ENDOCRINE:  Follow up FS.  Insulin protocol if needed.    Possible downgrade    Pager Number:  [phone]

## 2019-05-26 LAB
-  AMIKACIN: SIGNIFICANT CHANGE UP
-  AMPICILLIN/SULBACTAM: SIGNIFICANT CHANGE UP
-  AMPICILLIN: SIGNIFICANT CHANGE UP
-  AZTREONAM: SIGNIFICANT CHANGE UP
-  CEFEPIME: SIGNIFICANT CHANGE UP
-  CEFOXITIN: SIGNIFICANT CHANGE UP
-  CEFTRIAXONE: SIGNIFICANT CHANGE UP
-  CIPROFLOXACIN: SIGNIFICANT CHANGE UP
-  ERTAPENEM: SIGNIFICANT CHANGE UP
-  GENTAMICIN: SIGNIFICANT CHANGE UP
-  IMIPENEM: SIGNIFICANT CHANGE UP
-  LEVOFLOXACIN: SIGNIFICANT CHANGE UP
-  MEROPENEM: SIGNIFICANT CHANGE UP
-  NITROFURANTOIN: SIGNIFICANT CHANGE UP
-  PIPERACILLIN/TAZOBACTAM: SIGNIFICANT CHANGE UP
-  TIGECYCLINE: SIGNIFICANT CHANGE UP
-  TOBRAMYCIN: SIGNIFICANT CHANGE UP
-  TRIMETHOPRIM/SULFAMETHOXAZOLE: SIGNIFICANT CHANGE UP
ANION GAP SERPL CALC-SCNC: 10 MMOL/L — SIGNIFICANT CHANGE UP (ref 7–14)
BUN SERPL-MCNC: 37 MG/DL — HIGH (ref 10–20)
C1INH FUNCTIONAL/C1INH TOTAL MFR SERPL: 37 MG/DL — SIGNIFICANT CHANGE UP (ref 21–39)
CALCIUM SERPL-MCNC: 8.8 MG/DL — SIGNIFICANT CHANGE UP (ref 8.5–10.1)
CHLORIDE SERPL-SCNC: 101 MMOL/L — SIGNIFICANT CHANGE UP (ref 98–110)
CO2 SERPL-SCNC: 31 MMOL/L — SIGNIFICANT CHANGE UP (ref 17–32)
CREAT SERPL-MCNC: 1.1 MG/DL — SIGNIFICANT CHANGE UP (ref 0.7–1.5)
CULTURE RESULTS: SIGNIFICANT CHANGE UP
GLUCOSE SERPL-MCNC: 76 MG/DL — SIGNIFICANT CHANGE UP (ref 70–99)
HCT VFR BLD CALC: 37.7 % — SIGNIFICANT CHANGE UP (ref 37–47)
HGB BLD-MCNC: 12 G/DL — SIGNIFICANT CHANGE UP (ref 12–16)
MCHC RBC-ENTMCNC: 28.8 PG — SIGNIFICANT CHANGE UP (ref 27–31)
MCHC RBC-ENTMCNC: 31.8 G/DL — LOW (ref 32–37)
MCV RBC AUTO: 90.6 FL — SIGNIFICANT CHANGE UP (ref 81–99)
METHOD TYPE: SIGNIFICANT CHANGE UP
NRBC # BLD: 0 /100 WBCS — SIGNIFICANT CHANGE UP (ref 0–0)
ORGANISM # SPEC MICROSCOPIC CNT: SIGNIFICANT CHANGE UP
ORGANISM # SPEC MICROSCOPIC CNT: SIGNIFICANT CHANGE UP
PLATELET # BLD AUTO: 215 K/UL — SIGNIFICANT CHANGE UP (ref 130–400)
POTASSIUM SERPL-MCNC: 3.7 MMOL/L — SIGNIFICANT CHANGE UP (ref 3.5–5)
POTASSIUM SERPL-SCNC: 3.7 MMOL/L — SIGNIFICANT CHANGE UP (ref 3.5–5)
RBC # BLD: 4.16 M/UL — LOW (ref 4.2–5.4)
RBC # FLD: 15.4 % — HIGH (ref 11.5–14.5)
SODIUM SERPL-SCNC: 142 MMOL/L — SIGNIFICANT CHANGE UP (ref 135–146)
SPECIMEN SOURCE: SIGNIFICANT CHANGE UP
TROPONIN T SERPL-MCNC: <0.01 NG/ML — SIGNIFICANT CHANGE UP
WBC # BLD: 7.29 K/UL — SIGNIFICANT CHANGE UP (ref 4.8–10.8)
WBC # FLD AUTO: 7.29 K/UL — SIGNIFICANT CHANGE UP (ref 4.8–10.8)

## 2019-05-26 PROCEDURE — 71045 X-RAY EXAM CHEST 1 VIEW: CPT | Mod: 26

## 2019-05-26 RX ORDER — DOCUSATE SODIUM 100 MG
100 CAPSULE ORAL THREE TIMES A DAY
Refills: 0 | Status: DISCONTINUED | OUTPATIENT
Start: 2019-05-26 | End: 2019-05-28

## 2019-05-26 RX ORDER — SENNA PLUS 8.6 MG/1
2 TABLET ORAL AT BEDTIME
Refills: 0 | Status: DISCONTINUED | OUTPATIENT
Start: 2019-05-26 | End: 2019-05-28

## 2019-05-26 RX ORDER — ACETAMINOPHEN 500 MG
650 TABLET ORAL EVERY 6 HOURS
Refills: 0 | Status: DISCONTINUED | OUTPATIENT
Start: 2019-05-26 | End: 2019-05-28

## 2019-05-26 RX ADMIN — Medication 40 MILLIGRAM(S): at 05:05

## 2019-05-26 RX ADMIN — Medication 100 MILLIGRAM(S): at 21:46

## 2019-05-26 RX ADMIN — Medication 81 MILLIGRAM(S): at 11:30

## 2019-05-26 RX ADMIN — PANTOPRAZOLE SODIUM 40 MILLIGRAM(S): 20 TABLET, DELAYED RELEASE ORAL at 05:45

## 2019-05-26 RX ADMIN — Medication 100 MILLIGRAM(S): at 14:15

## 2019-05-26 RX ADMIN — SENNA PLUS 2 TABLET(S): 8.6 TABLET ORAL at 21:46

## 2019-05-26 RX ADMIN — CEFTRIAXONE 100 GRAM(S): 500 INJECTION, POWDER, FOR SOLUTION INTRAMUSCULAR; INTRAVENOUS at 10:23

## 2019-05-26 RX ADMIN — Medication 50 MILLIGRAM(S): at 10:33

## 2019-05-26 RX ADMIN — ATORVASTATIN CALCIUM 80 MILLIGRAM(S): 80 TABLET, FILM COATED ORAL at 21:46

## 2019-05-26 RX ADMIN — CHLORHEXIDINE GLUCONATE 1 APPLICATION(S): 213 SOLUTION TOPICAL at 05:44

## 2019-05-26 RX ADMIN — Medication 3 MILLILITER(S): at 13:45

## 2019-05-26 RX ADMIN — Medication 60 MILLIGRAM(S): at 05:08

## 2019-05-26 RX ADMIN — Medication 3 MILLILITER(S): at 07:47

## 2019-05-26 RX ADMIN — Medication 0.5 MILLIGRAM(S): at 21:50

## 2019-05-26 RX ADMIN — Medication 25 MILLIGRAM(S): at 05:06

## 2019-05-26 RX ADMIN — Medication 650 MILLIGRAM(S): at 22:33

## 2019-05-26 RX ADMIN — CITALOPRAM 30 MILLIGRAM(S): 10 TABLET, FILM COATED ORAL at 11:30

## 2019-05-26 RX ADMIN — Medication 1 APPLICATION(S): at 05:04

## 2019-05-26 RX ADMIN — Medication 1 APPLICATION(S): at 17:15

## 2019-05-26 RX ADMIN — Medication 3 MILLILITER(S): at 19:39

## 2019-05-26 RX ADMIN — SODIUM CHLORIDE 3 MILLILITER(S): 9 INJECTION INTRAMUSCULAR; INTRAVENOUS; SUBCUTANEOUS at 05:03

## 2019-05-26 RX ADMIN — Medication 0.5 MILLIGRAM(S): at 14:14

## 2019-05-26 RX ADMIN — Medication 650 MILLIGRAM(S): at 03:03

## 2019-05-26 RX ADMIN — Medication 25 MILLIGRAM(S): at 17:15

## 2019-05-26 RX ADMIN — Medication 650 MILLIGRAM(S): at 21:48

## 2019-05-26 RX ADMIN — Medication 40 MILLIGRAM(S): at 17:15

## 2019-05-26 RX ADMIN — SODIUM CHLORIDE 3 MILLILITER(S): 9 INJECTION INTRAMUSCULAR; INTRAVENOUS; SUBCUTANEOUS at 22:42

## 2019-05-26 RX ADMIN — SODIUM CHLORIDE 3 MILLILITER(S): 9 INJECTION INTRAMUSCULAR; INTRAVENOUS; SUBCUTANEOUS at 14:15

## 2019-05-26 RX ADMIN — ENOXAPARIN SODIUM 40 MILLIGRAM(S): 100 INJECTION SUBCUTANEOUS at 11:30

## 2019-05-26 NOTE — PROGRESS NOTE ADULT - ASSESSMENT
IMPRESSION:  anaphylaxis shock   asthma   FLAVIO  SOb hypoxia Acute resp failure c/w pulmonary edema  PLAN:    CNS: no sedation       HEENT: oral care     PULMONARY: keep pox . 92 %   prednisone 60 mg and taper   NIPPV at night as needed only  nebulizer Q4 hrs   off NIPPV currently    CARDIOVASCULAR: keep Is <  os   give lasix as needed   follow cardiology     GI: GI prophylaxis.  feeding when off NIPPV     RENAL: follow lytes   supplement K >4.0    INFECTIOUS DISEASE: no abx for now     HEMATOLOGICAL:  DVT prophylaxis.    ENDOCRINE:  Follow up FS.  Insulin protocol if needed.    MUSCULOSKELETAL: OOB to chair

## 2019-05-26 NOTE — PROGRESS NOTE ADULT - SUBJECTIVE AND OBJECTIVE BOX
SUBJECTIVE:    Patient is a 74y old Female who presents with a chief complaint of allergic reaction (26 May 2019 08:17)    Currently admitted to medicine with the primary diagnosis of Anaphylaxis, initial encounter  Today is hospital day 4d. She states having intermittent muscle spasm , otherwise feels better overall. No events overnight.    PAST MEDICAL & SURGICAL HISTORY  PVD (peripheral vascular disease)  Other emphysema  Other cardiomyopathy  TIA (transient ischemic attack)  COPD (chronic obstructive pulmonary disease)  Depression  Hypertension  History of cholecystectomy    ALLERGIES:  codeine (Other (Moderate))  Depakote (Unknown)  losartan (Angioedema)  verapamil (Short breath; Angioedema)    MEDICATIONS:  STANDING MEDICATIONS  ALBUTerol/ipratropium for Nebulization 3 milliLiter(s) Nebulizer every 6 hours  aspirin  chewable 81 milliGRAM(s) Oral daily  atorvastatin 80 milliGRAM(s) Oral at bedtime  cefTRIAXone   IVPB 1 Gram(s) IV Intermittent every 24 hours  cefTRIAXone   IVPB      chlorhexidine 4% Liquid 1 Application(s) Topical <User Schedule>  citalopram 30 milliGRAM(s) Oral daily  docusate sodium 100 milliGRAM(s) Oral three times a day  enoxaparin Injectable 40 milliGRAM(s) SubCutaneous daily  furosemide   Injectable 40 milliGRAM(s) IV Push two times a day  metoprolol tartrate 25 milliGRAM(s) Oral two times a day  pantoprazole    Tablet 40 milliGRAM(s) Oral before breakfast  predniSONE   Tablet 60 milliGRAM(s) Oral daily  senna 2 Tablet(s) Oral at bedtime  silver sulfADIAZINE 1% Cream 1 Application(s) Topical two times a day  sodium chloride 0.9% lock flush 3 milliLiter(s) IV Push every 8 hours    PRN MEDICATIONS  acetaminophen   Tablet .. 650 milliGRAM(s) Oral every 6 hours PRN  ALBUTerol    90 MICROgram(s) HFA Inhaler 2 Puff(s) Inhalation every 6 hours PRN  diphenhydrAMINE   Injectable 50 milliGRAM(s) IV Push every 8 hours PRN  LORazepam     Tablet 0.5 milliGRAM(s) Oral two times a day PRN  nitroglycerin     SubLingual 0.4 milliGRAM(s) SubLingual every 5 minutes PRN    VITALS:   T(F): 97.1  HR: 58  BP: 116/57  RR: 20  SpO2: 99%    LABS:                        12.0   7.29  )-----------( 215      ( 26 May 2019 05:54 )             37.7         142  |  101  |  37<H>  ----------------------------<  76  3.7   |  31  |  1.1    Ca    8.8      26 May 2019 05:54    TPro  5.8<L>  /  Alb  3.7  /  TBili  0.6  /  DBili  x   /  AST  17  /  ALT  15  /  AlkPhos  79        Urinalysis Basic - ( 24 May 2019 12:00 )    Color: Yellow / Appearance: Clear / S.010 / pH: x  Gluc: x / Ketone: Negative  / Bili: Negative / Urobili: 0.2 mg/dL   Blood: x / Protein: Negative mg/dL / Nitrite: Positive   Leuk Esterase: Moderate / RBC: x / WBC 6-10 /HPF   Sq Epi: x / Non Sq Epi: Few /HPF / Bacteria: Moderate        Troponin T, Serum: <0.01 ng/mL (19 @ 05:54)      Culture - Blood (collected 24 May 2019 15:40)  Source: .Blood None  Preliminary Report (25 May 2019 23:01):    No growth to date.    Culture - Urine (collected 24 May 2019 12:00)  Source: .Urine Clean Catch (Midstream)  Preliminary Report (25 May 2019 19:40):    >100,000 CFU/ml Gram Negative Rods      CARDIAC MARKERS ( 26 May 2019 05:54 )  x     / <0.01 ng/mL / x     / x     / x          RADIOLOGY:    < from: Xray Chest 1 View- PORTABLE-Routine (19 @ 05:35) >  Bilateral opacities, similar to the prior exam.    < end of copied text >      PHYSICAL EXAM:  GEN: No acute distress, off AVAP  LUNGS: b/l crackles   HEART: S1/S2 present. RRR.   ABD: Soft, non-tender, non-distended. Bowel sounds present  EXT: NC/NC/NE/2+PP/BRADY  NEURO: AAOX3

## 2019-05-26 NOTE — PROGRESS NOTE ADULT - SUBJECTIVE AND OBJECTIVE BOX
Patient is a 74y old  Female who presents with a chief complaint of allergic reaction (25 May 2019 10:32)        Interval Events: No overnight events. Feeling much better off NIPPV    REVIEW OF SYSTEMS:  Constitutional: No fevers or chills. No weight loss. No fatigue or generalized malaise.  Eyes: No itching or discharge from the eyes  ENT: No ear pain. No ear discharge. No nasal congestion. No post nasal drip. No epistaxis. No throat pain. No sore throat. No difficulty swallowing.   CV: No chest pain. No palpitations. No lightheadedness or dizziness.   Resp: No dyspnea at rest. ++dyspnea on exertion. No orthopnea. No wheezing. No cough. No stridor. No sputum production. No chest pain with respiration.  GI: No nausea. No vomiting. No diarrhea.  MSK: No joint pain or pain in any extremities  Integumentary: No skin lesions. No pedal edema.  Neurological: No gross motor weakness. No sensory changes.      OBJECTIVE:  ICU Vital Signs Last 24 Hrs  T(C): 36.2 (26 May 2019 05:00), Max: 36.2 (26 May 2019 05:00)  T(F): 97.1 (26 May 2019 05:00), Max: 97.1 (26 May 2019 05:00)  HR: 58 (26 May 2019 05:00) (57 - 76)  BP: 116/57 (26 May 2019 05:00) (94/53 - 126/62)  BP(mean): 81 (26 May 2019 05:00) (71 - 89)    RR: 20 (26 May 2019 07:10) (14 - 35)  SpO2: 99% (26 May 2019 05:00) (94% - 99%)         @ 07:01  -   @ 07:00  --------------------------------------------------------  IN: 530 mL / OUT: 3000 mL / NET: -2470 mL      PHYSICAL EXAM:  General: Awake, alert, oriented X 3.   HEENT: Atraumatic, normocephalic.                 Mallampatti Grade                 No nasal congestion.                No tonsillar or pharyngeal exudates.  Lymph Nodes: No palpable lymphadenopathy neck, supraclavicular region  Neck: No JVD. No carotid bruit, no thyromegally  Respiratory: Normal chest expansion                         Normal percussion                         Normal and equal air entry                         ++ rales.  Cardiovascular: S1 S2 normal. No murmurs, rubs or gallops.   Abdomen: Soft, non-tender, non-distended. No organomegaly.  Extremities: Warm to touch. Peripheral pulse palpable. No pedal edema.   Skin: No rashes or skin lesions, warm dry  Neurological: Motor and sensory examination equal and normal in all four extremities.  Psychiatry: Appropriate mood and affect.    HOSPITAL MEDICATIONS:  MEDICATIONS  (STANDING):  ALBUTerol/ipratropium for Nebulization 3 milliLiter(s) Nebulizer every 6 hours  aspirin  chewable 81 milliGRAM(s) Oral daily  atorvastatin 80 milliGRAM(s) Oral at bedtime  cefTRIAXone   IVPB 1 Gram(s) IV Intermittent every 24 hours  cefTRIAXone   IVPB      chlorhexidine 4% Liquid 1 Application(s) Topical <User Schedule>  citalopram 30 milliGRAM(s) Oral daily  docusate sodium 100 milliGRAM(s) Oral three times a day  enoxaparin Injectable 40 milliGRAM(s) SubCutaneous daily  furosemide   Injectable 40 milliGRAM(s) IV Push two times a day  metoprolol tartrate 25 milliGRAM(s) Oral two times a day  pantoprazole    Tablet 40 milliGRAM(s) Oral before breakfast  predniSONE   Tablet 60 milliGRAM(s) Oral daily  senna 2 Tablet(s) Oral at bedtime  silver sulfADIAZINE 1% Cream 1 Application(s) Topical two times a day  sodium chloride 0.9% lock flush 3 milliLiter(s) IV Push every 8 hours    MEDICATIONS  (PRN):  acetaminophen   Tablet .. 650 milliGRAM(s) Oral every 6 hours PRN Temp greater or equal to 38C (100.4F), Moderate Pain (4 - 6)  ALBUTerol    90 MICROgram(s) HFA Inhaler 2 Puff(s) Inhalation every 6 hours PRN Bronchospasm  diphenhydrAMINE   Injectable 50 milliGRAM(s) IV Push every 8 hours PRN Allergy symptoms  LORazepam     Tablet 0.5 milliGRAM(s) Oral two times a day PRN Anxiety  nitroglycerin     SubLingual 0.4 milliGRAM(s) SubLingual every 5 minutes PRN Chest Pain      LABS:                        12.0   7.29  )-----------( 215      ( 26 May 2019 05:54 )             37.7         142  |  101  |  37<H>  ----------------------------<  76  3.7   |  31  |  1.1    Ca    8.8      26 May 2019 05:54    TPro  5.8<L>  /  Alb  3.7  /  TBili  0.6  /  DBili  x   /  AST  17  /  ALT  15  /  AlkPhos  79  05-      Urinalysis Basic - ( 24 May 2019 12:00 )    Color: Yellow / Appearance: Clear / S.010 / pH: x  Gluc: x / Ketone: Negative  / Bili: Negative / Urobili: 0.2 mg/dL   Blood: x / Protein: Negative mg/dL / Nitrite: Positive   Leuk Esterase: Moderate / RBC: x / WBC 6-10 /HPF   Sq Epi: x / Non Sq Epi: Few /HPF / Bacteria: Moderate                    RADIOLOGY: Radiology personally reviewed.

## 2019-05-26 NOTE — PROGRESS NOTE ADULT - ASSESSMENT
Impression    Anaphylaxis: now resolved  Acute hypoxic respiratory failure due to pulmonary edema  COPD  HTN  former smoker    Plan    CNS: no sedation   off gabapentin and lyrica due to concern for anaphylaxis    HEENT: oral care     PULMONARY: keep sat>92 % , off AVAP  c/w  po prednisone.  CTA : negative for PE, no Pneumonia. B/L pleural effusion  c/w iv lasix q12h for now  nebs prn    CARDIOVASCULAR:   cxr revealing unchanged pulmonary vascular congestion  keep I<O; IV lasix 40 mg q12h  serial trops: negative, ecg: nonspecific st changes  2d echo: no wall motion abnormality. ef >60%, impaired relaxation  c/w iv lasix for now    GI: GI prophylaxis.   on dysphagia 1 pureed diet--- switch to soft diet    RENAL: follow lytes     INFECTIOUS DISEASE: on iv rocephin for UTI  Urine cx: gram negative rods, pending susceptibilities  blood cx: NGTD    HEMATOLOGICAL:  DVT prophylaxis.    ENDOCRINE:  Follow up FS.  Insulin protocol if needed.    Musculoskeletal: Out of bed to chair Impression    Anaphylaxis: now resolved  Acute hypoxic respiratory failure due to pulmonary edema  Urinary tract Infection  COPD  HTN  former smoker    Plan    CNS: no sedation   off gabapentin and lyrica due to concern for anaphylaxis    HEENT: oral care     PULMONARY: keep sat>92 % , off AVAP  c/w  po prednisone.  CTA : negative for PE, no Pneumonia. B/L pleural effusion  c/w iv lasix q12h for now  nebs prn    CARDIOVASCULAR:   cxr revealing unchanged pulmonary vascular congestion  keep I<O; IV lasix 40 mg q12h  serial trops: negative, ecg: nonspecific st changes  2d echo: no wall motion abnormality. ef >60%, impaired relaxation  c/w iv lasix for now    GI: GI prophylaxis.   on dysphagia 1 pureed diet--- switch to soft diet    RENAL: follow lytes     INFECTIOUS DISEASE: on iv rocephin for UTI  Urine cx: gram negative rods, pending susceptibilities  blood cx: NGTD    HEMATOLOGICAL:  DVT prophylaxis.    ENDOCRINE:  Follow up FS.  Insulin protocol if needed.    Musculoskeletal: Out of bed to chair

## 2019-05-26 NOTE — PROGRESS NOTE ADULT - SUBJECTIVE AND OBJECTIVE BOX
SUBJ:No chest pain or shortness of breath      MEDICATIONS  (STANDING):  ALBUTerol/ipratropium for Nebulization 3 milliLiter(s) Nebulizer every 6 hours  aspirin  chewable 81 milliGRAM(s) Oral daily  atorvastatin 80 milliGRAM(s) Oral at bedtime  cefTRIAXone   IVPB 1 Gram(s) IV Intermittent every 24 hours  cefTRIAXone   IVPB      chlorhexidine 4% Liquid 1 Application(s) Topical <User Schedule>  citalopram 30 milliGRAM(s) Oral daily  docusate sodium 100 milliGRAM(s) Oral three times a day  enoxaparin Injectable 40 milliGRAM(s) SubCutaneous daily  furosemide   Injectable 40 milliGRAM(s) IV Push two times a day  metoprolol tartrate 25 milliGRAM(s) Oral two times a day  pantoprazole    Tablet 40 milliGRAM(s) Oral before breakfast  predniSONE   Tablet 60 milliGRAM(s) Oral daily  senna 2 Tablet(s) Oral at bedtime  silver sulfADIAZINE 1% Cream 1 Application(s) Topical two times a day  sodium chloride 0.9% lock flush 3 milliLiter(s) IV Push every 8 hours    MEDICATIONS  (PRN):  acetaminophen   Tablet .. 650 milliGRAM(s) Oral every 6 hours PRN Temp greater or equal to 38C (100.4F), Moderate Pain (4 - 6)  ALBUTerol    90 MICROgram(s) HFA Inhaler 2 Puff(s) Inhalation every 6 hours PRN Bronchospasm  diphenhydrAMINE   Injectable 50 milliGRAM(s) IV Push every 8 hours PRN Allergy symptoms  LORazepam     Tablet 0.5 milliGRAM(s) Oral two times a day PRN Anxiety  nitroglycerin     SubLingual 0.4 milliGRAM(s) SubLingual every 5 minutes PRN Chest Pain            Vital Signs Last 24 Hrs  T(C): 36.2 (26 May 2019 05:00), Max: 36.2 (26 May 2019 05:00)  T(F): 97.1 (26 May 2019 05:00), Max: 97.1 (26 May 2019 05:00)  HR: 60 (26 May 2019 10:02) (54 - 76)  BP: 127/58 (26 May 2019 10:02) (98/53 - 127/58)  BP(mean): 83 (26 May 2019 10:02) (71 - 89)  RR: 18 (26 May 2019 09:09) (14 - 38)  SpO2: 97% (26 May 2019 10:02) (95% - 99%)      ECG:NML    TTE:    LABS:                        12.0   7.29  )-----------( 215      ( 26 May 2019 05:54 )             37.7     05-26    142  |  101  |  37<H>  ----------------------------<  76  3.7   |  31  |  1.1    Ca    8.8      26 May 2019 05:54    TPro  5.8<L>  /  Alb  3.7  /  TBili  0.6  /  DBili  x   /  AST  17  /  ALT  15  /  AlkPhos  79  05-25    CARDIAC MARKERS ( 26 May 2019 05:54 )  x     / <0.01 ng/mL / x     / x     / x              I&O's Summary    25 May 2019 07:01  -  26 May 2019 07:00  --------------------------------------------------------  IN: 530 mL / OUT: 3000 mL / NET: -2470 mL    26 May 2019 07:01  -  26 May 2019 11:04  --------------------------------------------------------  IN: 50 mL / OUT: 0 mL / NET: 50 mL      BNP

## 2019-05-27 RX ORDER — FUROSEMIDE 40 MG
40 TABLET ORAL
Refills: 0 | Status: DISCONTINUED | OUTPATIENT
Start: 2019-05-27 | End: 2019-05-28

## 2019-05-27 RX ORDER — DIPHENHYDRAMINE HCL 50 MG
50 CAPSULE ORAL EVERY 8 HOURS
Refills: 0 | Status: DISCONTINUED | OUTPATIENT
Start: 2019-05-27 | End: 2019-05-28

## 2019-05-27 RX ADMIN — Medication 100 MILLIGRAM(S): at 21:29

## 2019-05-27 RX ADMIN — Medication 650 MILLIGRAM(S): at 06:51

## 2019-05-27 RX ADMIN — PANTOPRAZOLE SODIUM 40 MILLIGRAM(S): 20 TABLET, DELAYED RELEASE ORAL at 11:55

## 2019-05-27 RX ADMIN — Medication 40 MILLIGRAM(S): at 18:00

## 2019-05-27 RX ADMIN — Medication 50 MILLIGRAM(S): at 06:14

## 2019-05-27 RX ADMIN — Medication 650 MILLIGRAM(S): at 06:06

## 2019-05-27 RX ADMIN — SODIUM CHLORIDE 3 MILLILITER(S): 9 INJECTION INTRAMUSCULAR; INTRAVENOUS; SUBCUTANEOUS at 13:04

## 2019-05-27 RX ADMIN — Medication 25 MILLIGRAM(S): at 05:30

## 2019-05-27 RX ADMIN — Medication 650 MILLIGRAM(S): at 20:00

## 2019-05-27 RX ADMIN — Medication 100 MILLIGRAM(S): at 05:30

## 2019-05-27 RX ADMIN — Medication 650 MILLIGRAM(S): at 18:59

## 2019-05-27 RX ADMIN — Medication 81 MILLIGRAM(S): at 11:56

## 2019-05-27 RX ADMIN — Medication 60 MILLIGRAM(S): at 05:30

## 2019-05-27 RX ADMIN — Medication 1 APPLICATION(S): at 18:00

## 2019-05-27 RX ADMIN — Medication 25 MILLIGRAM(S): at 18:00

## 2019-05-27 RX ADMIN — ATORVASTATIN CALCIUM 80 MILLIGRAM(S): 80 TABLET, FILM COATED ORAL at 21:29

## 2019-05-27 RX ADMIN — SENNA PLUS 2 TABLET(S): 8.6 TABLET ORAL at 21:29

## 2019-05-27 RX ADMIN — Medication 0.5 MILLIGRAM(S): at 21:48

## 2019-05-27 RX ADMIN — ENOXAPARIN SODIUM 40 MILLIGRAM(S): 100 INJECTION SUBCUTANEOUS at 11:57

## 2019-05-27 RX ADMIN — CEFTRIAXONE 100 GRAM(S): 500 INJECTION, POWDER, FOR SOLUTION INTRAMUSCULAR; INTRAVENOUS at 13:08

## 2019-05-27 RX ADMIN — Medication 3 MILLILITER(S): at 20:45

## 2019-05-27 RX ADMIN — SODIUM CHLORIDE 3 MILLILITER(S): 9 INJECTION INTRAMUSCULAR; INTRAVENOUS; SUBCUTANEOUS at 05:34

## 2019-05-27 RX ADMIN — Medication 40 MILLIGRAM(S): at 05:29

## 2019-05-27 RX ADMIN — CITALOPRAM 30 MILLIGRAM(S): 10 TABLET, FILM COATED ORAL at 11:56

## 2019-05-27 RX ADMIN — Medication 100 MILLIGRAM(S): at 13:05

## 2019-05-27 RX ADMIN — Medication 3 MILLILITER(S): at 13:17

## 2019-05-27 RX ADMIN — Medication 50 MILLIGRAM(S): at 21:57

## 2019-05-27 RX ADMIN — CHLORHEXIDINE GLUCONATE 1 APPLICATION(S): 213 SOLUTION TOPICAL at 11:55

## 2019-05-27 RX ADMIN — Medication 0.5 MILLIGRAM(S): at 12:01

## 2019-05-27 RX ADMIN — Medication 3 MILLILITER(S): at 07:51

## 2019-05-27 RX ADMIN — Medication 1 APPLICATION(S): at 05:33

## 2019-05-27 RX ADMIN — SODIUM CHLORIDE 3 MILLILITER(S): 9 INJECTION INTRAMUSCULAR; INTRAVENOUS; SUBCUTANEOUS at 21:29

## 2019-05-27 NOTE — PROGRESS NOTE ADULT - SUBJECTIVE AND OBJECTIVE BOX
Patient is a 74y old  Female who presents with a chief complaint of allergic reaction (26 May 2019 11:04)      T(F): 97.9 (05-27-19 @ 07:10), Max: 98.3 (05-27-19 @ 03:00)  HR: 61 (05-27-19 @ 06:14)  BP: 124/76 (05-27-19 @ 05:35)  RR: 22 (05-27-19 @ 07:10)  SpO2: 96% (05-27-19 @ 06:14) (96% - 100%)    PHYSICAL EXAM:  GENERAL: NAD, well-groomed, well-developed  HEAD:  Atraumatic, Normocephalic  EYES: EOMI, PERRLA, conjunctiva and sclera clear  ENMT: No tonsillar erythema, exudates, or enlargement; Moist mucous membranes, Good dentition, No lesions  NECK: Supple, No JVD, Normal thyroid  NERVOUS SYSTEM:  Alert & Oriented X3,  Motor Strength 5/5 B/L upper and lower extremities  CHEST/LUNG: Clear to percussion bilaterally; No rales, rhonchi, wheezing, or rubs  HEART: Regular rate and rhythm; No murmurs, rubs, or gallops  ABDOMEN: Soft, Nontender, Nondistended; Bowel sounds present  EXTREMITIES:   No clubbing, cyanosis, or edema  LYMPH: No lymphadenopathy noted  SKIN: No rashes or lesions    labs  05-26    142  |  101  |  37<H>  ----------------------------<  76  3.7   |  31  |  1.1    Ca    8.8      26 May 2019 05:54                            12.0   7.29  )-----------( 215      ( 26 May 2019 05:54 )             37.7       Culture - Blood (collected 24 May 2019 15:40)  Source: .Blood None  Preliminary Report (25 May 2019 23:01):    No growth to date.    Culture - Urine (collected 24 May 2019 12:00)  Source: .Urine Clean Catch (Midstream)  Final Report (26 May 2019 17:30):    >100,000 CFU/ml Escherichia coli  Organism: Escherichia coli (26 May 2019 17:30)  Organism: Escherichia coli (26 May 2019 17:30)              acetaminophen   Tablet .. 650 milliGRAM(s) Oral every 6 hours PRN  ALBUTerol    90 MICROgram(s) HFA Inhaler 2 Puff(s) Inhalation every 6 hours PRN  ALBUTerol/ipratropium for Nebulization 3 milliLiter(s) Nebulizer every 6 hours  aspirin  chewable 81 milliGRAM(s) Oral daily  atorvastatin 80 milliGRAM(s) Oral at bedtime  cefTRIAXone   IVPB 1 Gram(s) IV Intermittent every 24 hours  cefTRIAXone   IVPB      chlorhexidine 4% Liquid 1 Application(s) Topical <User Schedule>  citalopram 30 milliGRAM(s) Oral daily  diphenhydrAMINE   Injectable 50 milliGRAM(s) IV Push every 8 hours PRN  docusate sodium 100 milliGRAM(s) Oral three times a day  enoxaparin Injectable 40 milliGRAM(s) SubCutaneous daily  furosemide   Injectable 40 milliGRAM(s) IV Push two times a day  LORazepam     Tablet 0.5 milliGRAM(s) Oral two times a day PRN  metoprolol tartrate 25 milliGRAM(s) Oral two times a day  nitroglycerin     SubLingual 0.4 milliGRAM(s) SubLingual every 5 minutes PRN  pantoprazole    Tablet 40 milliGRAM(s) Oral before breakfast  predniSONE   Tablet 60 milliGRAM(s) Oral daily  senna 2 Tablet(s) Oral at bedtime  silver sulfADIAZINE 1% Cream 1 Application(s) Topical two times a day  sodium chloride 0.9% lock flush 3 milliLiter(s) IV Push every 8 hours

## 2019-05-27 NOTE — PROGRESS NOTE ADULT - SUBJECTIVE AND OBJECTIVE BOX
Patient is a 74y old  Female who presents with a chief complaint of allergic reaction (27 May 2019 07:41)        Interval Events: No overnight events. Feels better mult vague complaints. In general feeling better.    REVIEW OF SYSTEMS:  Constitutional: No fevers or chills. No weight loss. No fatigue or generalized malaise.  Eyes: No itching or discharge from the eyes  ENT: No ear pain. No ear discharge. No nasal congestion. No post nasal drip. No epistaxis. No throat pain. No sore throat. No difficulty swallowing.   CV: No chest pain. No palpitations. No lightheadedness or dizziness.   Resp: No dyspnea at rest. some dyspnea on exertion. No orthopnea. No wheezing. No cough. No stridor. No sputum production. No chest pain with respiration.  GI: No nausea. No vomiting. No diarrhea.  MSK: No joint pain or pain in any extremities  Integumentary: No skin lesions. No pedal edema.  Neurological: No gross motor weakness. No sensory changes.      OBJECTIVE:  ICU Vital Signs Last 24 Hrs  T(C): 36.6 (27 May 2019 07:10), Max: 36.8 (27 May 2019 03:00)  T(F): 97.9 (27 May 2019 07:10), Max: 98.3 (27 May 2019 03:00)  HR: 61 (27 May 2019 06:14) (59 - 73)  BP: 124/76 (27 May 2019 05:35) (105/55 - 147/67)  BP(mean): 95 (27 May 2019 05:35) (75 - 96)  RR: 22 (27 May 2019 07:10) (16 - 38)  SpO2: 96% (27 May 2019 06:14) (96% - 100%)        05-26 @ 07:01  -  05-27 @ 07:00  --------------------------------------------------------  IN: 50 mL / OUT: 3200 mL / NET: -3150 mL      CAPILLARY BLOOD GLUCOSE          PHYSICAL EXAM:  General: Awake, alert, oriented X 3.   HEENT: Atraumatic, normocephalic.                 Mallampatti Grade                 No nasal congestion.                No tonsillar or pharyngeal exudates.  Lymph Nodes: No palpable lymphadenopathy neck, supraclavicular region  Neck: No JVD. No carotid bruit, no thyromegally  Respiratory: Normal chest expansion                         Normal percussion                         Normal and equal air entry                          rales at bases  Cardiovascular: S1 S2 normal. No murmurs, rubs or gallops.   Abdomen: Soft, non-tender, non-distended. No organomegaly.  Extremities: Warm to touch. Peripheral pulse palpable. No pedal edema.   Skin: No rashes or skin lesions, warm dry  Neurological: Motor and sensory examination equal and normal in all four extremities.  Psychiatry: Appropriate mood and affect.    HOSPITAL MEDICATIONS:  MEDICATIONS  (STANDING):  ALBUTerol/ipratropium for Nebulization 3 milliLiter(s) Nebulizer every 6 hours  aspirin  chewable 81 milliGRAM(s) Oral daily  atorvastatin 80 milliGRAM(s) Oral at bedtime  cefTRIAXone   IVPB 1 Gram(s) IV Intermittent every 24 hours  cefTRIAXone   IVPB      chlorhexidine 4% Liquid 1 Application(s) Topical <User Schedule>  citalopram 30 milliGRAM(s) Oral daily  docusate sodium 100 milliGRAM(s) Oral three times a day  enoxaparin Injectable 40 milliGRAM(s) SubCutaneous daily  furosemide   Injectable 40 milliGRAM(s) IV Push two times a day  metoprolol tartrate 25 milliGRAM(s) Oral two times a day  pantoprazole    Tablet 40 milliGRAM(s) Oral before breakfast  predniSONE   Tablet 60 milliGRAM(s) Oral daily  senna 2 Tablet(s) Oral at bedtime  silver sulfADIAZINE 1% Cream 1 Application(s) Topical two times a day  sodium chloride 0.9% lock flush 3 milliLiter(s) IV Push every 8 hours    MEDICATIONS  (PRN):  acetaminophen   Tablet .. 650 milliGRAM(s) Oral every 6 hours PRN Temp greater or equal to 38C (100.4F), Moderate Pain (4 - 6)  ALBUTerol    90 MICROgram(s) HFA Inhaler 2 Puff(s) Inhalation every 6 hours PRN Bronchospasm  diphenhydrAMINE   Injectable 50 milliGRAM(s) IV Push every 8 hours PRN Allergy symptoms  LORazepam     Tablet 0.5 milliGRAM(s) Oral two times a day PRN Anxiety  nitroglycerin     SubLingual 0.4 milliGRAM(s) SubLingual every 5 minutes PRN Chest Pain      LABS:                        12.0   7.29  )-----------( 215      ( 26 May 2019 05:54 )             37.7     05-26    142  |  101  |  37<H>  ----------------------------<  76  3.7   |  31  |  1.1    Ca    8.8      26 May 2019 05:54                        RADIOLOGY:Radiology personally reviewed.

## 2019-05-27 NOTE — PROGRESS NOTE ADULT - ASSESSMENT
IMPRESSION:  anaphylaxis shock   asthma   FLAVIO  SOb hypoxia Acute resp failure c/w pulmonary edema  PLAN:    CNS: no sedation       HEENT: oral care     PULMONARY: keep pox . 92 %   prednisone 60 mg and taper   D/C NIPPV   nebulizer Q4 hrs      CARDIOVASCULAR: keep Is <  os   give lasix PRN  follow cardiology     GI: GI prophylaxis. nutrition    RENAL: follow lytes   supplement K >4.0    INFECTIOUS DISEASE: no abx for now     HEMATOLOGICAL:  DVT prophylaxis.    ENDOCRINE:  Follow up FS.  Insulin protocol if needed.    MUSCULOSKELETAL: OOB to chair    OK to downgrade

## 2019-05-27 NOTE — PROGRESS NOTE ADULT - ASSESSMENT
Impression    Anaphylaxis: now resolved  Acute hypoxic respiratory failure due to pulmonary edema: resolved  Urinary tract Infection  COPD  HTN  former smoker    Plan    CNS: no sedation   off gabapentin and lyrica for anaphylaxis     HEENT: oral care     PULMONARY: keep sat>92 % , off AVAP  c/w  po prednisone.  CTA : negative for PE, no Pneumonia. B/L pleural effusion  switch to po lasix today  nebs prn    CARDIOVASCULAR:   cxr revealing unchanged pulmonary vascular congestion  keep I<O; IV lasix 40 mg q12h-- switch to po  serial trops: negative, ecg: nonspecific st changes  2d echo: no wall motion abnormality. ef >60%, impaired relaxation    GI: GI prophylaxis.   on  soft diet    RENAL: follow lytes     INFECTIOUS DISEASE: on iv rocephin for UTI  Urine cx: gram negative rods: e.coli, sensitive to rocephin  blood cx: NGTD    HEMATOLOGICAL:  DVT prophylaxis.    ENDOCRINE:  Follow up FS.  Insulin protocol if needed.    Musculoskeletal: Out of bed to chair  Downgrade from unit. Keep on Tele today  Pt/rehab evaluation  Possible discharge tomorrow

## 2019-05-27 NOTE — PROGRESS NOTE ADULT - ATTENDING COMMENTS
Attending Statement: I have personally performed a face to face diagnostic evaluation on this patient. I have written the above note pertaining to the history, exam and plan of care.
Patient seen and evaluated independently medical resident note reviewed, I agree with plan and management, except as I have noted.
patient seen and examined on the bedside , chart reviewed, agree with the medical resident clinical findings and plan of care.  Anaphylaxis: now resolved  Possible downgrade  Pager number:  [phone]
sob improved. reproducible cp.follow on meds.
cxr improved. pt still sob. cont lasix. pulm f/u.

## 2019-05-27 NOTE — PROGRESS NOTE ADULT - SUBJECTIVE AND OBJECTIVE BOX
SUBJECTIVE:    Patient is a 74y old Female who presents with a chief complaint of allergic reaction (27 May 2019 07:54)  Currently admitted to medicine with the primary diagnosis of Anaphylaxis.   Today is hospital day 5d. This morning she is resting comfortably in bed and reports no new issues or overnight events. She feels symptomatically better.    PAST MEDICAL & SURGICAL HISTORY  PVD (peripheral vascular disease)  Other emphysema  Other cardiomyopathy  TIA (transient ischemic attack)  COPD (chronic obstructive pulmonary disease)  Depression  Hypertension  History of cholecystectomy    SOCIAL HISTORY:  former smoker    ALLERGIES:  codeine (Other (Moderate))  Depakote (Unknown)  losartan (Angioedema)  verapamil (Short breath; Angioedema)    MEDICATIONS:  STANDING MEDICATIONS  ALBUTerol/ipratropium for Nebulization 3 milliLiter(s) Nebulizer every 6 hours  aspirin  chewable 81 milliGRAM(s) Oral daily  atorvastatin 80 milliGRAM(s) Oral at bedtime  cefTRIAXone   IVPB 1 Gram(s) IV Intermittent every 24 hours  cefTRIAXone   IVPB      chlorhexidine 4% Liquid 1 Application(s) Topical <User Schedule>  citalopram 30 milliGRAM(s) Oral daily  docusate sodium 100 milliGRAM(s) Oral three times a day  enoxaparin Injectable 40 milliGRAM(s) SubCutaneous daily  furosemide    Tablet 40 milliGRAM(s) Oral two times a day  metoprolol tartrate 25 milliGRAM(s) Oral two times a day  pantoprazole    Tablet 40 milliGRAM(s) Oral before breakfast  predniSONE   Tablet 60 milliGRAM(s) Oral daily  senna 2 Tablet(s) Oral at bedtime  silver sulfADIAZINE 1% Cream 1 Application(s) Topical two times a day  sodium chloride 0.9% lock flush 3 milliLiter(s) IV Push every 8 hours    PRN MEDICATIONS  acetaminophen   Tablet .. 650 milliGRAM(s) Oral every 6 hours PRN  ALBUTerol    90 MICROgram(s) HFA Inhaler 2 Puff(s) Inhalation every 6 hours PRN  diphenhydrAMINE   Injectable 50 milliGRAM(s) IV Push every 8 hours PRN  LORazepam     Tablet 0.5 milliGRAM(s) Oral two times a day PRN  nitroglycerin     SubLingual 0.4 milliGRAM(s) SubLingual every 5 minutes PRN    VITALS:   T(F): 97.9  HR: 55  BP: 123/60  RR: 22  SpO2: 94%    LABS:                        12.0   7.29  )-----------( 215      ( 26 May 2019 05:54 )             37.7     05-26    142  |  101  |  37<H>  ----------------------------<  76  3.7   |  31  |  1.1    Ca    8.8      26 May 2019 05:54      Culture - Blood (collected 24 May 2019 15:40)  Source: .Blood None  Preliminary Report (25 May 2019 23:01):    No growth to date.    Culture - Urine (collected 24 May 2019 12:00)  Source: .Urine Clean Catch (Midstream)  Final Report (26 May 2019 17:30):    >100,000 CFU/ml Escherichia coli  Organism: Escherichia coli (26 May 2019 17:30)  Organism: Escherichia coli (26 May 2019 17:30)      CARDIAC MARKERS ( 26 May 2019 05:54 )  x     / <0.01 ng/mL / x     / x     / x          RADIOLOGY:    < from: Xray Chest 1 View- PORTABLE-Routine (05.26.19 @ 05:35) >  Bilateral opacities, similar to the prior exam.    < end of copied text >    PHYSICAL EXAM:  GEN: No acute distress  LUNGS: b/l occasional rales at bases  HEART: S1/S2 present. RRR.   ABD: Soft, non-tender, non-distended. Bowel sounds present  EXT: NC/NC/NE/2+PP/BRADY  NEURO: AAOX3

## 2019-05-28 ENCOUNTER — TRANSCRIPTION ENCOUNTER (OUTPATIENT)
Age: 74
End: 2019-05-28

## 2019-05-28 ENCOUNTER — INPATIENT (INPATIENT)
Facility: HOSPITAL | Age: 74
LOS: 4 days | Discharge: HOME HEALTH | End: 2019-06-02
Attending: INTERNAL MEDICINE | Admitting: INTERNAL MEDICINE
Payer: MEDICARE

## 2019-05-28 VITALS
TEMPERATURE: 97 F | OXYGEN SATURATION: 99 % | DIASTOLIC BLOOD PRESSURE: 60 MMHG | SYSTOLIC BLOOD PRESSURE: 135 MMHG | RESPIRATION RATE: 24 BRPM | WEIGHT: 195.11 LBS | HEART RATE: 63 BPM | HEIGHT: 62 IN

## 2019-05-28 VITALS — HEART RATE: 80 BPM | SYSTOLIC BLOOD PRESSURE: 111 MMHG | DIASTOLIC BLOOD PRESSURE: 53 MMHG | RESPIRATION RATE: 51 BRPM

## 2019-05-28 DIAGNOSIS — Z90.49 ACQUIRED ABSENCE OF OTHER SPECIFIED PARTS OF DIGESTIVE TRACT: Chronic | ICD-10-CM

## 2019-05-28 LAB
ALBUMIN SERPL ELPH-MCNC: 3.5 G/DL — SIGNIFICANT CHANGE UP (ref 3.5–5.2)
ALP SERPL-CCNC: 70 U/L — SIGNIFICANT CHANGE UP (ref 30–115)
ALT FLD-CCNC: 24 U/L — SIGNIFICANT CHANGE UP (ref 0–41)
ANION GAP SERPL CALC-SCNC: 11 MMOL/L — SIGNIFICANT CHANGE UP (ref 7–14)
ANION GAP SERPL CALC-SCNC: 13 MMOL/L — SIGNIFICANT CHANGE UP (ref 7–14)
AST SERPL-CCNC: 34 U/L — SIGNIFICANT CHANGE UP (ref 0–41)
BASOPHILS # BLD AUTO: 0.01 K/UL — SIGNIFICANT CHANGE UP (ref 0–0.2)
BASOPHILS NFR BLD AUTO: 0.1 % — SIGNIFICANT CHANGE UP (ref 0–1)
BILIRUB SERPL-MCNC: 0.3 MG/DL — SIGNIFICANT CHANGE UP (ref 0.2–1.2)
BUN SERPL-MCNC: 38 MG/DL — HIGH (ref 10–20)
BUN SERPL-MCNC: 41 MG/DL — HIGH (ref 10–20)
CALCIUM SERPL-MCNC: 9 MG/DL — SIGNIFICANT CHANGE UP (ref 8.5–10.1)
CALCIUM SERPL-MCNC: 9.1 MG/DL — SIGNIFICANT CHANGE UP (ref 8.5–10.1)
CHLORIDE SERPL-SCNC: 99 MMOL/L — SIGNIFICANT CHANGE UP (ref 98–110)
CHLORIDE SERPL-SCNC: 99 MMOL/L — SIGNIFICANT CHANGE UP (ref 98–110)
CO2 SERPL-SCNC: 27 MMOL/L — SIGNIFICANT CHANGE UP (ref 17–32)
CO2 SERPL-SCNC: 30 MMOL/L — SIGNIFICANT CHANGE UP (ref 17–32)
CREAT SERPL-MCNC: 1 MG/DL — SIGNIFICANT CHANGE UP (ref 0.7–1.5)
CREAT SERPL-MCNC: 1.2 MG/DL — SIGNIFICANT CHANGE UP (ref 0.7–1.5)
EOSINOPHIL # BLD AUTO: 0.01 K/UL — SIGNIFICANT CHANGE UP (ref 0–0.7)
EOSINOPHIL NFR BLD AUTO: 0.1 % — SIGNIFICANT CHANGE UP (ref 0–8)
GLUCOSE SERPL-MCNC: 101 MG/DL — HIGH (ref 70–99)
GLUCOSE SERPL-MCNC: 85 MG/DL — SIGNIFICANT CHANGE UP (ref 70–99)
HCT VFR BLD CALC: 37.8 % — SIGNIFICANT CHANGE UP (ref 37–47)
HCT VFR BLD CALC: 37.8 % — SIGNIFICANT CHANGE UP (ref 37–47)
HGB BLD-MCNC: 12 G/DL — SIGNIFICANT CHANGE UP (ref 12–16)
HGB BLD-MCNC: 12.1 G/DL — SIGNIFICANT CHANGE UP (ref 12–16)
IMM GRANULOCYTES NFR BLD AUTO: 0.4 % — HIGH (ref 0.1–0.3)
LYMPHOCYTES # BLD AUTO: 1.36 K/UL — SIGNIFICANT CHANGE UP (ref 1.2–3.4)
LYMPHOCYTES # BLD AUTO: 13.1 % — LOW (ref 20.5–51.1)
MAGNESIUM SERPL-MCNC: 2 MG/DL — SIGNIFICANT CHANGE UP (ref 1.8–2.4)
MCHC RBC-ENTMCNC: 28.7 PG — SIGNIFICANT CHANGE UP (ref 27–31)
MCHC RBC-ENTMCNC: 28.7 PG — SIGNIFICANT CHANGE UP (ref 27–31)
MCHC RBC-ENTMCNC: 31.7 G/DL — LOW (ref 32–37)
MCHC RBC-ENTMCNC: 32 G/DL — SIGNIFICANT CHANGE UP (ref 32–37)
MCV RBC AUTO: 89.6 FL — SIGNIFICANT CHANGE UP (ref 81–99)
MCV RBC AUTO: 90.4 FL — SIGNIFICANT CHANGE UP (ref 81–99)
MONOCYTES # BLD AUTO: 0.86 K/UL — HIGH (ref 0.1–0.6)
MONOCYTES NFR BLD AUTO: 8.3 % — SIGNIFICANT CHANGE UP (ref 1.7–9.3)
NEUTROPHILS # BLD AUTO: 8.07 K/UL — HIGH (ref 1.4–6.5)
NEUTROPHILS NFR BLD AUTO: 78 % — HIGH (ref 42.2–75.2)
NRBC # BLD: 0 /100 WBCS — SIGNIFICANT CHANGE UP (ref 0–0)
NRBC # BLD: 0 /100 WBCS — SIGNIFICANT CHANGE UP (ref 0–0)
PLATELET # BLD AUTO: 230 K/UL — SIGNIFICANT CHANGE UP (ref 130–400)
PLATELET # BLD AUTO: 248 K/UL — SIGNIFICANT CHANGE UP (ref 130–400)
POTASSIUM SERPL-MCNC: 3.7 MMOL/L — SIGNIFICANT CHANGE UP (ref 3.5–5)
POTASSIUM SERPL-MCNC: 4.7 MMOL/L — SIGNIFICANT CHANGE UP (ref 3.5–5)
POTASSIUM SERPL-SCNC: 3.7 MMOL/L — SIGNIFICANT CHANGE UP (ref 3.5–5)
POTASSIUM SERPL-SCNC: 4.7 MMOL/L — SIGNIFICANT CHANGE UP (ref 3.5–5)
PROT SERPL-MCNC: 6.3 G/DL — SIGNIFICANT CHANGE UP (ref 6–8)
RBC # BLD: 4.18 M/UL — LOW (ref 4.2–5.4)
RBC # BLD: 4.22 M/UL — SIGNIFICANT CHANGE UP (ref 4.2–5.4)
RBC # FLD: 14.8 % — HIGH (ref 11.5–14.5)
RBC # FLD: 15.3 % — HIGH (ref 11.5–14.5)
SODIUM SERPL-SCNC: 139 MMOL/L — SIGNIFICANT CHANGE UP (ref 135–146)
SODIUM SERPL-SCNC: 140 MMOL/L — SIGNIFICANT CHANGE UP (ref 135–146)
WBC # BLD: 10.35 K/UL — SIGNIFICANT CHANGE UP (ref 4.8–10.8)
WBC # BLD: 8.73 K/UL — SIGNIFICANT CHANGE UP (ref 4.8–10.8)
WBC # FLD AUTO: 10.35 K/UL — SIGNIFICANT CHANGE UP (ref 4.8–10.8)
WBC # FLD AUTO: 8.73 K/UL — SIGNIFICANT CHANGE UP (ref 4.8–10.8)

## 2019-05-28 PROCEDURE — 99291 CRITICAL CARE FIRST HOUR: CPT

## 2019-05-28 PROCEDURE — 71045 X-RAY EXAM CHEST 1 VIEW: CPT | Mod: 26,76

## 2019-05-28 PROCEDURE — 71045 X-RAY EXAM CHEST 1 VIEW: CPT | Mod: 26

## 2019-05-28 RX ORDER — OMEPRAZOLE 10 MG/1
1 CAPSULE, DELAYED RELEASE ORAL
Qty: 30 | Refills: 0
Start: 2019-05-28 | End: 2019-06-26

## 2019-05-28 RX ORDER — EPINEPHRINE 0.3 MG/.3ML
0.3 INJECTION INTRAMUSCULAR; SUBCUTANEOUS ONCE
Refills: 0 | Status: COMPLETED | OUTPATIENT
Start: 2019-05-28 | End: 2019-05-28

## 2019-05-28 RX ORDER — SENNA PLUS 8.6 MG/1
2 TABLET ORAL
Qty: 0 | Refills: 0 | DISCHARGE
Start: 2019-05-28

## 2019-05-28 RX ORDER — OMEPRAZOLE 10 MG/1
1 CAPSULE, DELAYED RELEASE ORAL
Qty: 0 | Refills: 0 | DISCHARGE

## 2019-05-28 RX ORDER — RANITIDINE HYDROCHLORIDE 150 MG/1
1 TABLET, FILM COATED ORAL
Qty: 0 | Refills: 0 | DISCHARGE

## 2019-05-28 RX ORDER — METHENAMINE MANDELATE 1 G
1 TABLET ORAL
Qty: 10 | Refills: 0
Start: 2019-05-28 | End: 2019-06-01

## 2019-05-28 RX ORDER — DIPHENHYDRAMINE HCL 50 MG
25 CAPSULE ORAL ONCE
Refills: 0 | Status: COMPLETED | OUTPATIENT
Start: 2019-05-28 | End: 2019-05-28

## 2019-05-28 RX ORDER — FUROSEMIDE 40 MG
1 TABLET ORAL
Qty: 30 | Refills: 0
Start: 2019-05-28 | End: 2019-06-26

## 2019-05-28 RX ORDER — DOCUSATE SODIUM 100 MG
1 CAPSULE ORAL
Qty: 0 | Refills: 0 | DISCHARGE
Start: 2019-05-28

## 2019-05-28 RX ADMIN — Medication 650 MILLIGRAM(S): at 05:45

## 2019-05-28 RX ADMIN — CEFTRIAXONE 100 GRAM(S): 500 INJECTION, POWDER, FOR SOLUTION INTRAMUSCULAR; INTRAVENOUS at 10:41

## 2019-05-28 RX ADMIN — PANTOPRAZOLE SODIUM 40 MILLIGRAM(S): 20 TABLET, DELAYED RELEASE ORAL at 06:30

## 2019-05-28 RX ADMIN — Medication 1 APPLICATION(S): at 05:22

## 2019-05-28 RX ADMIN — EPINEPHRINE 0.3 MILLIGRAM(S): 0.3 INJECTION INTRAMUSCULAR; SUBCUTANEOUS at 22:24

## 2019-05-28 RX ADMIN — Medication 125 MILLIGRAM(S): at 22:01

## 2019-05-28 RX ADMIN — Medication 3 MILLILITER(S): at 07:58

## 2019-05-28 RX ADMIN — Medication 100 MILLIGRAM(S): at 05:21

## 2019-05-28 RX ADMIN — CITALOPRAM 30 MILLIGRAM(S): 10 TABLET, FILM COATED ORAL at 11:00

## 2019-05-28 RX ADMIN — SODIUM CHLORIDE 3 MILLILITER(S): 9 INJECTION INTRAMUSCULAR; INTRAVENOUS; SUBCUTANEOUS at 05:21

## 2019-05-28 RX ADMIN — Medication 25 MILLIGRAM(S): at 05:21

## 2019-05-28 RX ADMIN — ENOXAPARIN SODIUM 40 MILLIGRAM(S): 100 INJECTION SUBCUTANEOUS at 11:01

## 2019-05-28 RX ADMIN — Medication 101 MILLIGRAM(S): at 22:00

## 2019-05-28 RX ADMIN — Medication 81 MILLIGRAM(S): at 11:00

## 2019-05-28 RX ADMIN — Medication 40 MILLIGRAM(S): at 05:21

## 2019-05-28 RX ADMIN — Medication 0.5 MILLIGRAM(S): at 10:52

## 2019-05-28 RX ADMIN — CHLORHEXIDINE GLUCONATE 1 APPLICATION(S): 213 SOLUTION TOPICAL at 10:37

## 2019-05-28 RX ADMIN — Medication 3 MILLILITER(S): at 13:12

## 2019-05-28 RX ADMIN — Medication 50 MILLIGRAM(S): at 06:30

## 2019-05-28 RX ADMIN — EPINEPHRINE 0.3 MILLIGRAM(S): 0.3 INJECTION INTRAMUSCULAR; SUBCUTANEOUS at 22:01

## 2019-05-28 RX ADMIN — Medication 60 MILLIGRAM(S): at 05:21

## 2019-05-28 NOTE — ED PROVIDER NOTE - PHYSICAL EXAMINATION
VITAL SIGNS: I have reviewed nursing notes and confirm.  CONSTITUTIONAL: well-appearing, non-toxic,   SKIN: Warm dry, normal skin turgor  HEAD: NCAT  EYES: EOMI, PERRLA, no scleral icterus  ENT:  angioedema  tongue mallampati  4 ,  no stridor no drooling,  no change in voice   NECK: Supple; non tender. Full ROM. No cervical LAD  CARD: RRR, no murmurs, rubs or gallops  RESP: clear to ausculation b/l.  No rales, rhonchi, or wheezing.  ABD: soft, + BS, non-tender, non-distended, no rebound or guarding. No CVA tenderness  EXT: Full ROM, no bony tenderness, no pedal edema, no calf tenderness  NEURO:  2 + DP pulses  b/l  LE<  warm to touch good cap refill no swelling , no edema,  decreased flexion extension  of ankle b/l ,  decreased sensation to soft  touch  b/l feet

## 2019-05-28 NOTE — PROGRESS NOTE ADULT - ASSESSMENT
Impression    Anaphylaxis: now resolved  Acute hypoxic respiratory failure due to pulmonary edema: resolved  Urinary tract Infection - e-coli sensitive to Rocephin-->completed 5 day course today   COPD  HTN  former smoker    Plan    CNS: no sedation   off gabapentin and lyrica for anaphylaxis     HEENT: oral care     PULMONARY: keep sat>92 % , off AVAP  c/w  po prednisone oral taper  CTA : negative for PE, no Pneumonia. B/L pleural effusion  continue oral Lasix  nebs prn    CARDIOVASCULAR:   2d echo: no wall motion abnormality. ef >60%, impaired relaxation    GI:   tolerating oral  diet        INFECTIOUS DISEASE: on iv Rocephin for UTI  Urine cx: gram negative rods: e.coli, sensitive to Rocephin  blood cx: NGTD      Pt asking to go home   discharge home today 36min spent on dc

## 2019-05-28 NOTE — PROGRESS NOTE ADULT - REASON FOR ADMISSION
allergic reaction
allergic reaction
Anaphylaxis
allergic reaction

## 2019-05-28 NOTE — DISCHARGE NOTE PROVIDER - NSDCCPCAREPLAN_GEN_ALL_CORE_FT
PRINCIPAL DISCHARGE DIAGNOSIS  Diagnosis: Anaphylaxis, initial encounter  Assessment and Plan of Treatment: An epi pen was sent to your pharmacy. Please follow up with the allergist for further testing.      SECONDARY DISCHARGE DIAGNOSES  Diagnosis: Chest pain  Assessment and Plan of Treatment: Resolved. Please follow up with your cardiologist.    Diagnosis: Bacterial UTI  Assessment and Plan of Treatment: Continue taking your antibiotic for 5 days, PRINCIPAL DISCHARGE DIAGNOSIS  Diagnosis: Anaphylaxis, initial encounter  Assessment and Plan of Treatment: An epi pen was sent to your pharmacy. Please follow up with the allergist for further testing.      SECONDARY DISCHARGE DIAGNOSES  Diagnosis: Chest pain  Assessment and Plan of Treatment: Resolved. Please follow up with your cardiologist.    Diagnosis: Bacterial UTI  Assessment and Plan of Treatment: completed antibiotic course.

## 2019-05-28 NOTE — PROGRESS NOTE ADULT - SUBJECTIVE AND OBJECTIVE BOX
Patient is a 74y old  Female who presents with a chief complaint of allergic reaction (27 May 2019 09:16)      T(F): 98.5 (05-28-19 @ 07:32), Max: 98.5 (05-28-19 @ 07:32)  HR: 66 (05-28-19 @ 05:15)  BP: 125/60 (05-28-19 @ 05:15)  RR: 17 (05-28-19 @ 07:32)  SpO2: 97% (05-28-19 @ 05:15) (94% - 98%)    PHYSICAL EXAM:  GENERAL: NAD, well-groomed, well-developed  HEAD:  Atraumatic, Normocephalic  EYES: EOMI, PERRLA, conjunctiva and sclera clear  ENMT: No tonsillar erythema, exudates, or enlargement; Moist mucous membranes, Good dentition, No lesions  NECK: Supple, No JVD, Normal thyroid  NERVOUS SYSTEM:  Alert & Oriented X3,  Motor Strength 5/5 B/L upper and lower extremities  CHEST/LUNG: Clear to percussion bilaterally; No rales, rhonchi, wheezing, or rubs  HEART: Regular rate and rhythm; No murmurs, rubs, or gallops  ABDOMEN: Soft, Nontender, Nondistended; Bowel sounds present  EXTREMITIES:   No clubbing, cyanosis, or edema  LYMPH: No lymphadenopathy noted  SKIN: No rashes or lesions    labs  05-28    140  |  99  |  41<H>  ----------------------------<  85  3.7   |  30  |  1.2    Ca    9.1      28 May 2019 05:41  Mg     2.0     05-28                            12.0   8.73  )-----------( 230      ( 28 May 2019 05:41 )             37.8               acetaminophen   Tablet .. 650 milliGRAM(s) Oral every 6 hours PRN  ALBUTerol    90 MICROgram(s) HFA Inhaler 2 Puff(s) Inhalation every 6 hours PRN  ALBUTerol/ipratropium for Nebulization 3 milliLiter(s) Nebulizer every 6 hours  aspirin  chewable 81 milliGRAM(s) Oral daily  atorvastatin 80 milliGRAM(s) Oral at bedtime  cefTRIAXone   IVPB 1 Gram(s) IV Intermittent every 24 hours  cefTRIAXone   IVPB      chlorhexidine 4% Liquid 1 Application(s) Topical <User Schedule>  citalopram 30 milliGRAM(s) Oral daily  diphenhydrAMINE 50 milliGRAM(s) Oral every 8 hours PRN  diphenhydrAMINE   Injectable 50 milliGRAM(s) IV Push every 8 hours PRN  docusate sodium 100 milliGRAM(s) Oral three times a day  enoxaparin Injectable 40 milliGRAM(s) SubCutaneous daily  furosemide    Tablet 40 milliGRAM(s) Oral two times a day  LORazepam     Tablet 0.5 milliGRAM(s) Oral two times a day PRN  metoprolol tartrate 25 milliGRAM(s) Oral two times a day  nitroglycerin     SubLingual 0.4 milliGRAM(s) SubLingual every 5 minutes PRN  pantoprazole    Tablet 40 milliGRAM(s) Oral before breakfast  predniSONE   Tablet 60 milliGRAM(s) Oral daily  senna 2 Tablet(s) Oral at bedtime  silver sulfADIAZINE 1% Cream 1 Application(s) Topical two times a day  sodium chloride 0.9% lock flush 3 milliLiter(s) IV Push every 8 hours

## 2019-05-28 NOTE — CONSULT NOTE ADULT - SUBJECTIVE AND OBJECTIVE BOX
HPI:  74y /o F pmh  HTN COPD depression TIA p/w anaphylaxis.    HISTORY OF angioedema secondary to aerosol toxin (polyurothane as per patient ) APRIL 2019  Pt w/ hx anaphylaxis to antihypertensive (combination losartan-HCTZ) that required epi in the past (reportedly with quick decompensation, nearly intubated).  This time, pt reports no new medication, food, detergent, lotion, soap or other exposure.   Did not eat lunch or dinner after eating a late breakfast.    C/o tongue swelling, SOB and trouble swallowing similar to prior - started after dinner tonight - called EMS who gave her 0.3mg epi on scene prior to ED arrival.   reported persistent tongue swelling and voice change and pt intermittently hyperventilating about how it felt to swallow.  No drooling or stridor.    2ND DOSE OF EPI GIVEN IN OUR ER.    PTN  REFERRED TO ACUTE  REHAB  FOR  EVAL AND  TX   PAST MEDICAL & SURGICAL HISTORY:  PVD (peripheral vascular disease)  Other emphysema  Other cardiomyopathy  TIA (transient ischemic attack)  COPD (chronic obstructive pulmonary disease)  Depression  Hypertension  History of cholecystectomy      Hospital Course:    TODAY'S SUBJECTIVE & REVIEW OF SYMPTOMS:     Constitutional WNL   Cardio WNL   Resp WNL   GI WNL  Heme WNL  Endo WNL  Skin WNL  MSK WNL  Neuro WNL  Cognitive WNL  Psych WNL      MEDICATIONS  (STANDING):  ALBUTerol/ipratropium for Nebulization 3 milliLiter(s) Nebulizer every 6 hours  aspirin  chewable 81 milliGRAM(s) Oral daily  atorvastatin 80 milliGRAM(s) Oral at bedtime  cefTRIAXone   IVPB 1 Gram(s) IV Intermittent every 24 hours  cefTRIAXone   IVPB      chlorhexidine 4% Liquid 1 Application(s) Topical <User Schedule>  citalopram 30 milliGRAM(s) Oral daily  docusate sodium 100 milliGRAM(s) Oral three times a day  enoxaparin Injectable 40 milliGRAM(s) SubCutaneous daily  furosemide    Tablet 40 milliGRAM(s) Oral two times a day  metoprolol tartrate 25 milliGRAM(s) Oral two times a day  pantoprazole    Tablet 40 milliGRAM(s) Oral before breakfast  predniSONE   Tablet 60 milliGRAM(s) Oral daily  senna 2 Tablet(s) Oral at bedtime  silver sulfADIAZINE 1% Cream 1 Application(s) Topical two times a day  sodium chloride 0.9% lock flush 3 milliLiter(s) IV Push every 8 hours    MEDICATIONS  (PRN):  acetaminophen   Tablet .. 650 milliGRAM(s) Oral every 6 hours PRN Temp greater or equal to 38C (100.4F), Moderate Pain (4 - 6)  ALBUTerol    90 MICROgram(s) HFA Inhaler 2 Puff(s) Inhalation every 6 hours PRN Bronchospasm  diphenhydrAMINE 50 milliGRAM(s) Oral every 8 hours PRN Rash and/or Itching  diphenhydrAMINE   Injectable 50 milliGRAM(s) IV Push every 8 hours PRN Allergy symptoms  LORazepam     Tablet 0.5 milliGRAM(s) Oral two times a day PRN Anxiety  nitroglycerin     SubLingual 0.4 milliGRAM(s) SubLingual every 5 minutes PRN Chest Pain      FAMILY HISTORY:  No pertinent family history in first degree relatives      Allergies    codeine (Other (Moderate))  Depakote (Unknown)  losartan (Angioedema)  verapamil (Short breath; Angioedema)    Intolerances        SOCIAL HISTORY:    [  ] Etoh  [  ] Smoking  [  ] Substance abuse     Home Environment:  [  ] Home Alone  [  x] Lives with Family  [  ] Home Health Aid    Dwelling:  [  ] Apartment  [ x ] Private House  [  ] Adult Home  [  ] Skilled Nursing Facility      [  ] Short Term  [  ] Long Term  [ x ] Stairs       Elevator [  ]    FUNCTIONAL STATUS PTA: (Check all that apply)  Ambulation: [ x  ]Independent    [  ] Dependent     [  ] Non-Ambulatory  Assistive Device: [  ] SA Cane  [  ]  Q Cane  [x  ] Walker  [  ]  Wheelchair  ADL : [ x ] Independent  [  ]  Dependent       Vital Signs Last 24 Hrs  T(C): 36.9 (28 May 2019 07:32), Max: 36.9 (28 May 2019 07:32)  T(F): 98.5 (28 May 2019 07:32), Max: 98.5 (28 May 2019 07:32)  HR: 67 (28 May 2019 07:13) (64 - 74)  BP: 122/60 (28 May 2019 07:13) (111/56 - 130/58)  BP(mean): 84 (28 May 2019 07:13) (77 - 87)  RR: 17 (28 May 2019 07:32) (16 - 27)  SpO2: 97% (28 May 2019 07:13) (94% - 98%)      PHYSICAL EXAM: Alert & Oriented X3  GENERAL: NAD, well-groomed, well-developed  HEAD:  Atraumatic, Normocephalic  EYES: EOMI, PERRLA, conjunctiva and sclera clear  NECK: Supple, No JVD, Normal thyroid  CHEST/LUNG: Clear to percussion bilaterally; No rales, rhonchi, wheezing, or rubs  HEART: Regular rate and rhythm; No murmurs, rubs, or gallops  ABDOMEN: Soft, Nontender, Nondistended; Bowel sounds present  EXTREMITIES:  2+ Peripheral Pulses, No clubbing, cyanosis, or edema    NERVOUS SYSTEM:  Cranial Nerves 2-12 intact [ x ] Abnormal  [  ]  ROM: WFL all extremities [  x]  Abnormal [  ]  Motor Strength: WFL all extremities  [x ]  Abnormal [  ]  Sensation: intact to light touch [  x] Abnormal [  ]  Reflexes: Symmetric [ x ]  Abnormal [  ]    FUNCTIONAL STATUS:  Bed Mobility: Independent [  ]  Supervision [ x ]  Needs Assistance [ x ]  N/A [  ]  Transfers: Independent [  ]  Supervision [  x]  Needs Assistance [ x ]  N/A [  ]   Ambulation: Independent [  ]  Supervision [  ]  Needs Assistance [x  ]  N/A [  ]  ADL: Independent [  ] Requires Assistance [  ] N/A [x  ]  SEE PT/OT IE NOTES    LABS:                        12.0   8.73  )-----------( 230      ( 28 May 2019 05:41 )             37.8     05-28    140  |  99  |  41<H>  ----------------------------<  85  3.7   |  30  |  1.2    Ca    9.1      28 May 2019 05:41  Mg     2.0     05-28            RADIOLOGY & ADDITIONAL STUDIES:    Assesment:

## 2019-05-28 NOTE — DISCHARGE NOTE PROVIDER - PROVIDER TOKENS
PROVIDER:[TOKEN:[[id number]]],PROVIDER:[TOKEN:[[id number]]],PROVIDER:[TOKEN:[[id number]]],PROVIDER:[TOKEN:[[id number]]]

## 2019-05-28 NOTE — DISCHARGE NOTE PROVIDER - NSDCHHNEEDSERVICE_GEN_ALL_CORE
Rehabilitation services Wound care and assessment/Rehabilitation services/Teaching and training/Medication teaching and assessment/Observation and assessment

## 2019-05-28 NOTE — ED PROVIDER NOTE - NS ED ROS FT
Constitutional:  No fever, chills,   Eyes:  No eye pain or visual changes  ENMT: No nasal discharge, no toothache, no sore throat. No neck pain or stiffness  Cardiac:  No chest pain or palpitations  Respiratory:  No cough or respiratory distress.   GI:  No nausea, vomiting, diarrhea or abdominal pain.  :  No dysuria, frequency or burning.  MS:  No back or joint pain.  Neuro: hpi  Skin:  No skin rash  Except as documented in the HPI,  all other systems are negative

## 2019-05-28 NOTE — ED PROVIDER NOTE - OBJECTIVE STATEMENT
74yF pmhx PVD, COPD ( cpap - Dr louis), Caridomyopathy on lasix 20 mg daily (cards: Dr jaime), HTN  -  2 admissions  for angioedema. -  dcd today -  Per  records -  pt  admitted 5 weeks ago  for angioedema to ICU - dcd from Lisinopril- HCTZ,    most recent 5/22/19 -  possibel cause:  polurethane,  gabapentin,  verapamil - all were  stopped -   Pt had prolonged stay  2.2 pulm mildred a-  lasox increased to 40mg  whle in [facility]ital , and had UTI - finished abx while in hospital,  had  chestp pain with trop engative,  echo 60% ef, CTA no PE -  b/l pleural effusions - -  dcd today  at 3pm with prednisone  taper . Pt here today as she was home eating dinner - macaroni -  and  felt  tongue numbness -  took benadryl and had improvement -  Pt  called EMS as she had b/l  feet numbness -   per   pat had this in the past  and never  followed up with  [facility]  vascular -  had  r/o  dvt  but  did not follow with further studies  Pt uses walker   for knee pain -   P called EMS  for  numbness feet  - walked to ambulance -   while in ambulance  pt had  tongue swelling.  no chest pain  sob - took her prednisone tonight  and the rest of her meds -  pt says this is the same swelling she had  when she was admitted  recently

## 2019-05-28 NOTE — DISCHARGE NOTE NURSING/CASE MANAGEMENT/SOCIAL WORK - NSDCDPATPORTLINK_GEN_ALL_CORE
You can access the [facility] Patient Portal, offered by [facility], by registering with the following website: [email or web address]

## 2019-05-28 NOTE — ED ADULT TRIAGE NOTE - CHIEF COMPLAINT QUOTE
allergic reaction to unknown   Pt has been taken off Neurontin and started on new medication.  c/o swollen tongue

## 2019-05-28 NOTE — PROGRESS NOTE ADULT - PROVIDER SPECIALTY LIST ADULT
Cardiology
Critical Care
Hospitalist
Hospitalist
Internal Medicine
Pulmonology
Hospitalist
Cardiology

## 2019-05-28 NOTE — DISCHARGE NOTE PROVIDER - HOSPITAL COURSE
Ms. Henderson is a 74y F with pmhx of HTN, COPD,  depression and TIA who presented on the day of admission with tongue swelling, shortness of breath and trouble swallowing. She had anaphylaxis to antihypertensive (combination losartan-HCTZ) that required epi in the past and said this episode had the same symptoms. EMS gave her 0.3 mg epi, received second dose of epi in ED here. In the ED she was given steroids, Pepcid, benadryl, albuterol, IV LR with partial improvement.    In the ICU she was started on 60 mg prednisone taper, nebulizers and CPAP at night. She complained of repeated tongue swelling and chest pain during her stay. No tongue swelling was visible. Cardiac enzymes were negative with non specific EKG changes. 2D echo showed no wall motion abnormality. ef >60%, impaired relaxation. CTA showed no pulmonary embolism and bilateral pleural effusion. She was started on IV lasix. On hospital day 2 she spiked a fever to 101F and complained of burning on urination, U/A was positive and started on rocephin. Yesterday she was switched to PO lasix. She recevied rocephin here for 4 days. Will send 5 days of antibiotics. Ms. Henderson is a 74y F with pmhx of HTN, COPD,  depression and TIA who presented on the day of admission with tongue swelling, shortness of breath and trouble swallowing. She had anaphylaxis to antihypertensive (combination losartan-HCTZ) that required epi in the past and said this episode had the same symptoms. EMS gave her 0.3 mg epi, received second dose of epi in ED here. In the ED she was given steroids, Pepcid, benadryl, albuterol, IV LR with partial improvement.    In the ICU she was started on 60 mg prednisone taper, nebulizers and CPAP at night. She complained of repeated tongue swelling and chest pain during her stay. No tongue swelling was visible. Cardiac enzymes were negative with non specific EKG changes. 2D echo showed no wall motion abnormality. ef >60%, impaired relaxation. CTA showed no pulmonary embolism and bilateral pleural effusion. She was started on IV lasix. On hospital day 2 she spiked a fever to 101F and complained of burning on urination, U/A was positive and started on rocephin. Yesterday she was switched to PO lasix. She recevied rocephin here for 4 days. Patient will be discharged on  5 days of po antibiotics to complete the course. She has been advised to follow up with allergist and pulmonary as outpatient. Ms. Henderson is a 74y F with pmhx of HTN, COPD,  depression and TIA who presented on the day of admission with tongue swelling, shortness of breath and trouble swallowing. She had anaphylaxis to antihypertensive (combination losartan-HCTZ) that required epi in the past and said this episode had the same symptoms. EMS gave her 0.3 mg epi, received second dose of epi in ED here. In the ED she was given steroids, Pepcid, benadryl, albuterol, IV LR with partial improvement.    In the ICU she was started on 60 mg prednisone taper, nebulizers and CPAP at night. She complained of repeated tongue swelling and chest pain during her stay. No tongue swelling was visible. Cardiac enzymes were negative with non specific EKG changes. 2D echo showed no wall motion abnormality. ef >60%, impaired relaxation. CTA showed no pulmonary embolism and bilateral pleural effusion. She was started on IV lasix. On hospital day 2 she spiked a fever to 101F and complained of burning on urination, U/A was positive and started on rocephin. Yesterday she was switched to PO lasix. She completed a 5 day course of  rocephin. course. She has been advised to follow up with allergist and pulmonary as outpatient. Ms. Henderson is a 74y F with pmhx of HTN, COPD,  depression and TIA who presented on the day of admission with tongue swelling, shortness of breath and trouble swallowing. She had anaphylaxis to antihypertensive (combination losartan-HCTZ) that required epi in the past and said this episode had the same symptoms. EMS gave her 0.3 mg epi, received second dose of epi in ED here. In the ED she was given steroids, Pepcid, benadryl, albuterol, IV LR with partial improvement.    In the ICU she was started on 60 mg prednisone taper, nebulizers and CPAP at night. She complained of repeated tongue swelling and chest pain during her stay. No tongue swelling was visible. Cardiac enzymes were negative with non specific EKG changes. 2D echo showed no wall motion abnormality. ef >60%, impaired relaxation. CTA showed no pulmonary embolism and bilateral pleural effusion. She was started on IV lasix. On hospital day 2 she spiked a fever to 101F and complained of burning on urination, U/A was positive and started on rocephin, and she completed a 5 day course of  rocephin. Yesterday she was switched to PO lasix. She has been advised to follow up with allergist and pulmonary as outpatient.

## 2019-05-28 NOTE — ED ADULT NURSE NOTE - NSIMPLEMENTINTERV_GEN_ALL_ED
Implemented All Fall with Harm Risk Interventions:  [place] to call system. Call bell, personal items and telephone within reach. Instruct patient to call for assistance. Room bathroom lighting operational. Non-slip footwear when patient is off stretcher. Physically safe environment: no spills, clutter or unnecessary equipment. Stretcher in lowest position, wheels locked, appropriate side rails in place. Provide visual cue, wrist band, yellow gown, etc. Monitor gait and stability. Monitor for mental status changes and reorient to person, place, and time. Review medications for side effects contributing to fall risk. Reinforce activity limits and safety measures with patient and family. Provide visual clues: red socks.

## 2019-05-28 NOTE — DISCHARGE NOTE PROVIDER - CARE PROVIDERS DIRECT ADDRESSES
[email or web address],[email or web address]

## 2019-05-28 NOTE — ED ADULT NURSE NOTE - PMH
Allergic reaction    COPD (chronic obstructive pulmonary disease)    Depression    Hypertension    Other cardiomyopathy    Other emphysema    PVD (peripheral vascular disease)    TIA (transient ischemic attack)

## 2019-05-28 NOTE — DISCHARGE NOTE PROVIDER - NSDCFUADDAPPT_GEN_ALL_CORE_FT
Please make appointments and follow up with Dr. Encarnacion, Dr. Bishop, Dr. Chavez and Dr. Aguilar.

## 2019-05-28 NOTE — ED PROVIDER NOTE - CLINICAL SUMMARY MEDICAL DECISION MAKING FREE TEXT BOX
Pt pw angioedema   -  epi given  IM x 2 ,  -  improvement of symptoms -   consulted andadmitted to  ICU for airway watch

## 2019-05-28 NOTE — ED PROVIDER NOTE - PROGRESS NOTE DETAILS
Pt originally remarked  tongue swelling improved      NOw reports  feels it increasing to left side of  tongue -   no chest pain  -  pt well appearing-   2nd epi given

## 2019-05-28 NOTE — DISCHARGE NOTE PROVIDER - CARE PROVIDER_API CALL
Carl Bishop)  Cardiovascular Disease; Internal Medicine  [address] [place] [address]  Phone: [phone]  Fax: [phone]  Follow Up Time:     Hunter Chavez)  Internal Medicine; Pulmonary Disease  [address], [address]  [place] [address]  Phone: [phone]  Fax: [phone]  Follow Up Time:     Milad Aguilar)  Family Medicine  [address] [place] [address]  Phone: [phone]  Fax: [phone]  Follow Up Time:     Mary Beth Encarnacion)  Allergy and Immunology; Internal Medicine  [address] [place] [address]  Phone: [phone]  Fax: [phone]  Follow Up Time: Carl Bishop)  Cardiovascular Disease; Internal Medicine  [address] [place] [address]  Phone: [phone]  Fax: [phone]  Follow Up Time:     Hunter Chavez)  Internal Medicine; Pulmonary Disease  [address], [address]  [place] [address]  Phone: [phone]  Fax: [phone]  Follow Up Time:     Milad Aguilar)  Family Medicine  [address] [place] [address]  Phone: [phone]  Fax: [phone]  Follow Up Time:     Mary Beth Encarnacion)  Allergy and Immunology; Internal Medicine  [address] [place] [address]  Phone: [phone]  Fax: [phone]  Follow Up Time:

## 2019-05-28 NOTE — PROGRESS NOTE ADULT - SUBJECTIVE AND OBJECTIVE BOX
Patient seen and evaluated this am, pt reported feeling great      T(F): 98.5 (05-28-19 @ 07:32), Max: 98.5 (05-28-19 @ 07:32)  HR: 80 (05-28-19 @ 13:26)  BP: 111/53 (05-28-19 @ 13:26)  RR: 18  SpO2: 97% (05-28-19 @ 07:13) (94% - 98%)    PHYSICAL EXAM:  GENERAL: NAD  HEAD:  Atraumatic, Normocephalic  NERVOUS SYSTEM:  Alert & Oriented X3, no focal deficits  CHEST/LUNG: Clear to percussion bilaterally; No rales, rhonchi, wheezing, or rubs  HEART: Regular rate and rhythm; No murmurs, rubs, or gallops  ABDOMEN: Soft, Nontender, Nondistended; Bowel sounds present  EXTREMITIES:  2+ Peripheral Pulses, No clubbing, cyanosis, or edema  LYMPH: No lymphadenopathy noted  SKIN: No rashes or lesions    LABS  05-28    140  |  99  |  41<H>  ----------------------------<  85  3.7   |  30  |  1.2    Ca    9.1      28 May 2019 05:41  Mg     2.0     05-28                            12.0   8.73  )-----------( 230      ( 28 May 2019 05:41 )             37.8         CARDIAC ENZYMES      Troponin T, Serum: <0.01 ng/mL (05-26-19 @ 05:54)      Culture Results:   No growth to date. (05-24-19)  Culture Results:   >100,000 CFU/ml Escherichia coli (05-24-19)      MEDICATIONS  (STANDING):  ALBUTerol/ipratropium for Nebulization 3 milliLiter(s) Nebulizer every 6 hours  aspirin  chewable 81 milliGRAM(s) Oral daily  atorvastatin 80 milliGRAM(s) Oral at bedtime  cefTRIAXone   IVPB 1 Gram(s) IV Intermittent every 24 hours  chlorhexidine 4% Liquid 1 Application(s) Topical <User Schedule>  citalopram 30 milliGRAM(s) Oral daily  docusate sodium 100 milliGRAM(s) Oral three times a day  enoxaparin Injectable 40 milliGRAM(s) SubCutaneous daily  furosemide    Tablet 40 milliGRAM(s) Oral two times a day  metoprolol tartrate 25 milliGRAM(s) Oral two times a day  pantoprazole    Tablet 40 milliGRAM(s) Oral before breakfast  predniSONE   Tablet 60 milliGRAM(s) Oral daily  senna 2 Tablet(s) Oral at bedtime  silver sulfADIAZINE 1% Cream 1 Application(s) Topical two times a day      MEDICATIONS  (PRN):  acetaminophen   Tablet .. 650 milliGRAM(s) Oral every 6 hours PRN Temp greater or equal to 38C (100.4F), Moderate Pain (4 - 6)  ALBUTerol    90 MICROgram(s) HFA Inhaler 2 Puff(s) Inhalation every 6 hours PRN Bronchospasm  diphenhydrAMINE 50 milliGRAM(s) Oral every 8 hours PRN Rash and/or Itching  diphenhydrAMINE   Injectable 50 milliGRAM(s) IV Push every 8 hours PRN Allergy symptoms  LORazepam     Tablet 0.5 milliGRAM(s) Oral two times a day PRN Anxiety  nitroglycerin     SubLingual 0.4 milliGRAM(s) SubLingual every 5 minutes PRN Chest Pain

## 2019-05-28 NOTE — CONSULT NOTE ADULT - ASSESSMENT
IMPRESSION: Rehab of 73 y/o  f  rehab  for  debility      PRECAUTIONS: [  ] Cardiac  [  ] Respiratory  [  ] Seizures [  ] Contact Isolation  [  ] Droplet Isolation  [ FALL ] Other    Weight Bearing Status:     RECOMMENDATION:    Out of Bed to Chair     DVT/Decubiti Prophylaxis    REHAB PLAN:     [  xx ] Bedside P/T 3-5 times a week   [   ]   Bedside O/T  2-3 times a week             [   ] No Rehab Therapy Indicated                   [   ]  Speech Therapy   Conditioning/ROM                                    ADL  Bed Mobility                                               Conditioning/ROM  Transfers                                                     Bed Mobility  Sitting /Standing Balance                         Transfers                                        Gait Training                                               Sitting/Standing Balance  Stair Training [   ]Applicable                    Home equipment Eval                                                                        Splinting  [   ] Only      GOALS:   ADL   [ x  ]   Independent                    Transfers  [x   ] Independent                          Ambulation  [  x ] Independent     [   x ] With device                            [   ]  CG                                                         [   ]  CG                                                                  [   ] CG                            [    ] Min A                                                   [   ] Min A                                                              [   ] Min  A          DISCHARGE PLAN:   [   ]  Good candidate for Intensive Rehabilitation/Hospital based-4A [facility]                                             Will tolerate 3hrs Intensive Rehab Daily                                       [   x ]  Short Term Rehab in Skilled Nursing Facility  ptn  refused to  go  to STR  wish  to  have  pt  at  home                                      [  x  ]  Home with Outpatient or VN services                                         [    ]  Possible Candidate for Intensive Hospital based Rehab

## 2019-05-29 LAB
ALBUMIN SERPL ELPH-MCNC: 3.3 G/DL — LOW (ref 3.5–5.2)
ALP SERPL-CCNC: 70 U/L — SIGNIFICANT CHANGE UP (ref 30–115)
ALT FLD-CCNC: 23 U/L — SIGNIFICANT CHANGE UP (ref 0–41)
ANION GAP SERPL CALC-SCNC: 13 MMOL/L — SIGNIFICANT CHANGE UP (ref 7–14)
AST SERPL-CCNC: 22 U/L — SIGNIFICANT CHANGE UP (ref 0–41)
BASOPHILS # BLD AUTO: 0 K/UL — SIGNIFICANT CHANGE UP (ref 0–0.2)
BASOPHILS NFR BLD AUTO: 0 % — SIGNIFICANT CHANGE UP (ref 0–1)
BILIRUB SERPL-MCNC: 0.3 MG/DL — SIGNIFICANT CHANGE UP (ref 0.2–1.2)
BUN SERPL-MCNC: 36 MG/DL — HIGH (ref 10–20)
CALCIUM SERPL-MCNC: 9.1 MG/DL — SIGNIFICANT CHANGE UP (ref 8.5–10.1)
CHLORIDE SERPL-SCNC: 102 MMOL/L — SIGNIFICANT CHANGE UP (ref 98–110)
CO2 SERPL-SCNC: 27 MMOL/L — SIGNIFICANT CHANGE UP (ref 17–32)
CREAT SERPL-MCNC: 1 MG/DL — SIGNIFICANT CHANGE UP (ref 0.7–1.5)
CULTURE RESULTS: SIGNIFICANT CHANGE UP
EOSINOPHIL # BLD AUTO: 0 K/UL — SIGNIFICANT CHANGE UP (ref 0–0.7)
EOSINOPHIL NFR BLD AUTO: 0 % — SIGNIFICANT CHANGE UP (ref 0–8)
GLUCOSE SERPL-MCNC: 149 MG/DL — HIGH (ref 70–99)
HCT VFR BLD CALC: 36.2 % — LOW (ref 37–47)
HGB BLD-MCNC: 11.5 G/DL — LOW (ref 12–16)
IMM GRANULOCYTES NFR BLD AUTO: 0.5 % — HIGH (ref 0.1–0.3)
LYMPHOCYTES # BLD AUTO: 0.57 K/UL — LOW (ref 1.2–3.4)
LYMPHOCYTES # BLD AUTO: 5.3 % — LOW (ref 20.5–51.1)
MAGNESIUM SERPL-MCNC: 2.2 MG/DL — SIGNIFICANT CHANGE UP (ref 1.8–2.4)
MCHC RBC-ENTMCNC: 28.5 PG — SIGNIFICANT CHANGE UP (ref 27–31)
MCHC RBC-ENTMCNC: 31.8 G/DL — LOW (ref 32–37)
MCV RBC AUTO: 89.8 FL — SIGNIFICANT CHANGE UP (ref 81–99)
MONOCYTES # BLD AUTO: 0.27 K/UL — SIGNIFICANT CHANGE UP (ref 0.1–0.6)
MONOCYTES NFR BLD AUTO: 2.5 % — SIGNIFICANT CHANGE UP (ref 1.7–9.3)
NEUTROPHILS # BLD AUTO: 9.82 K/UL — HIGH (ref 1.4–6.5)
NEUTROPHILS NFR BLD AUTO: 91.7 % — HIGH (ref 42.2–75.2)
NRBC # BLD: 0 /100 WBCS — SIGNIFICANT CHANGE UP (ref 0–0)
PHOSPHATE SERPL-MCNC: 3.7 MG/DL — SIGNIFICANT CHANGE UP (ref 2.1–4.9)
PLATELET # BLD AUTO: 242 K/UL — SIGNIFICANT CHANGE UP (ref 130–400)
POTASSIUM SERPL-MCNC: 4.1 MMOL/L — SIGNIFICANT CHANGE UP (ref 3.5–5)
POTASSIUM SERPL-SCNC: 4.1 MMOL/L — SIGNIFICANT CHANGE UP (ref 3.5–5)
PROT SERPL-MCNC: 5.7 G/DL — LOW (ref 6–8)
RBC # BLD: 4.03 M/UL — LOW (ref 4.2–5.4)
RBC # FLD: 14.9 % — HIGH (ref 11.5–14.5)
SODIUM SERPL-SCNC: 142 MMOL/L — SIGNIFICANT CHANGE UP (ref 135–146)
SPECIMEN SOURCE: SIGNIFICANT CHANGE UP
WBC # BLD: 10.71 K/UL — SIGNIFICANT CHANGE UP (ref 4.8–10.8)
WBC # FLD AUTO: 10.71 K/UL — SIGNIFICANT CHANGE UP (ref 4.8–10.8)

## 2019-05-29 PROCEDURE — 93970 EXTREMITY STUDY: CPT | Mod: 26

## 2019-05-29 RX ORDER — ONDANSETRON 8 MG/1
4 TABLET, FILM COATED ORAL EVERY 6 HOURS
Refills: 0 | Status: DISCONTINUED | OUTPATIENT
Start: 2019-05-29 | End: 2019-06-01

## 2019-05-29 RX ORDER — CHLORHEXIDINE GLUCONATE 213 G/1000ML
1 SOLUTION TOPICAL
Refills: 0 | Status: DISCONTINUED | OUTPATIENT
Start: 2019-05-29 | End: 2019-06-01

## 2019-05-29 RX ORDER — FAMOTIDINE 10 MG/ML
40 INJECTION INTRAVENOUS DAILY
Refills: 0 | Status: DISCONTINUED | OUTPATIENT
Start: 2019-05-29 | End: 2019-05-29

## 2019-05-29 RX ORDER — CITALOPRAM 10 MG/1
30 TABLET, FILM COATED ORAL DAILY
Refills: 0 | Status: DISCONTINUED | OUTPATIENT
Start: 2019-05-29 | End: 2019-06-01

## 2019-05-29 RX ORDER — PANTOPRAZOLE SODIUM 20 MG/1
40 TABLET, DELAYED RELEASE ORAL
Refills: 0 | Status: DISCONTINUED | OUTPATIENT
Start: 2019-05-29 | End: 2019-06-01

## 2019-05-29 RX ORDER — DULOXETINE HYDROCHLORIDE 30 MG/1
20 CAPSULE, DELAYED RELEASE ORAL DAILY
Refills: 0 | Status: DISCONTINUED | OUTPATIENT
Start: 2019-05-29 | End: 2019-06-01

## 2019-05-29 RX ORDER — IPRATROPIUM/ALBUTEROL SULFATE 18-103MCG
3 AEROSOL WITH ADAPTER (GRAM) INHALATION EVERY 6 HOURS
Refills: 0 | Status: DISCONTINUED | OUTPATIENT
Start: 2019-05-29 | End: 2019-06-02

## 2019-05-29 RX ORDER — ASPIRIN/CALCIUM CARB/MAGNESIUM 324 MG
81 TABLET ORAL DAILY
Refills: 0 | Status: DISCONTINUED | OUTPATIENT
Start: 2019-05-29 | End: 2019-06-01

## 2019-05-29 RX ORDER — SODIUM CHLORIDE 9 MG/ML
1000 INJECTION INTRAMUSCULAR; INTRAVENOUS; SUBCUTANEOUS
Refills: 0 | Status: DISCONTINUED | OUTPATIENT
Start: 2019-05-29 | End: 2019-05-29

## 2019-05-29 RX ORDER — ATORVASTATIN CALCIUM 80 MG/1
80 TABLET, FILM COATED ORAL AT BEDTIME
Refills: 0 | Status: DISCONTINUED | OUTPATIENT
Start: 2019-05-29 | End: 2019-06-01

## 2019-05-29 RX ORDER — FUROSEMIDE 40 MG
20 TABLET ORAL DAILY
Refills: 0 | Status: DISCONTINUED | OUTPATIENT
Start: 2019-05-29 | End: 2019-06-01

## 2019-05-29 RX ORDER — ACETAMINOPHEN 500 MG
650 TABLET ORAL EVERY 6 HOURS
Refills: 0 | Status: DISCONTINUED | OUTPATIENT
Start: 2019-05-29 | End: 2019-06-01

## 2019-05-29 RX ORDER — METOPROLOL TARTRATE 50 MG
25 TABLET ORAL
Refills: 0 | Status: DISCONTINUED | OUTPATIENT
Start: 2019-05-29 | End: 2019-06-01

## 2019-05-29 RX ORDER — DIPHENHYDRAMINE HCL 50 MG
50 CAPSULE ORAL EVERY 8 HOURS
Refills: 0 | Status: DISCONTINUED | OUTPATIENT
Start: 2019-05-29 | End: 2019-06-01

## 2019-05-29 RX ORDER — ALBUTEROL 90 UG/1
1 AEROSOL, METERED ORAL EVERY 6 HOURS
Refills: 0 | Status: DISCONTINUED | OUTPATIENT
Start: 2019-05-29 | End: 2019-06-01

## 2019-05-29 RX ORDER — HEPARIN SODIUM 5000 [USP'U]/ML
5000 INJECTION INTRAVENOUS; SUBCUTANEOUS EVERY 8 HOURS
Refills: 0 | Status: DISCONTINUED | OUTPATIENT
Start: 2019-05-29 | End: 2019-06-01

## 2019-05-29 RX ADMIN — Medication 25 MILLIGRAM(S): at 05:58

## 2019-05-29 RX ADMIN — HEPARIN SODIUM 5000 UNIT(S): 5000 INJECTION INTRAVENOUS; SUBCUTANEOUS at 21:08

## 2019-05-29 RX ADMIN — CHLORHEXIDINE GLUCONATE 1 APPLICATION(S): 213 SOLUTION TOPICAL at 08:13

## 2019-05-29 RX ADMIN — Medication 650 MILLIGRAM(S): at 02:48

## 2019-05-29 RX ADMIN — Medication 81 MILLIGRAM(S): at 12:36

## 2019-05-29 RX ADMIN — ATORVASTATIN CALCIUM 80 MILLIGRAM(S): 80 TABLET, FILM COATED ORAL at 21:08

## 2019-05-29 RX ADMIN — HEPARIN SODIUM 5000 UNIT(S): 5000 INJECTION INTRAVENOUS; SUBCUTANEOUS at 13:22

## 2019-05-29 RX ADMIN — Medication 50 MILLIGRAM(S): at 19:38

## 2019-05-29 RX ADMIN — Medication 125 MILLIGRAM(S): at 05:58

## 2019-05-29 RX ADMIN — Medication 1 APPLICATION(S): at 17:35

## 2019-05-29 RX ADMIN — Medication 650 MILLIGRAM(S): at 03:45

## 2019-05-29 RX ADMIN — PANTOPRAZOLE SODIUM 40 MILLIGRAM(S): 20 TABLET, DELAYED RELEASE ORAL at 13:21

## 2019-05-29 RX ADMIN — HEPARIN SODIUM 5000 UNIT(S): 5000 INJECTION INTRAVENOUS; SUBCUTANEOUS at 05:58

## 2019-05-29 RX ADMIN — Medication 1 APPLICATION(S): at 05:58

## 2019-05-29 RX ADMIN — CHLORHEXIDINE GLUCONATE 1 APPLICATION(S): 213 SOLUTION TOPICAL at 17:36

## 2019-05-29 RX ADMIN — Medication 20 MILLIGRAM(S): at 05:58

## 2019-05-29 RX ADMIN — CITALOPRAM 30 MILLIGRAM(S): 10 TABLET, FILM COATED ORAL at 12:36

## 2019-05-29 RX ADMIN — DULOXETINE HYDROCHLORIDE 20 MILLIGRAM(S): 30 CAPSULE, DELAYED RELEASE ORAL at 13:21

## 2019-05-29 RX ADMIN — Medication 0.5 MILLIGRAM(S): at 12:35

## 2019-05-29 RX ADMIN — ALBUTEROL 1 PUFF(S): 90 AEROSOL, METERED ORAL at 16:15

## 2019-05-29 RX ADMIN — Medication 40 MILLIGRAM(S): at 12:36

## 2019-05-29 RX ADMIN — ONDANSETRON 4 MILLIGRAM(S): 8 TABLET, FILM COATED ORAL at 22:29

## 2019-05-29 RX ADMIN — Medication 650 MILLIGRAM(S): at 19:38

## 2019-05-29 RX ADMIN — Medication 650 MILLIGRAM(S): at 09:47

## 2019-05-29 RX ADMIN — Medication 650 MILLIGRAM(S): at 10:17

## 2019-05-29 RX ADMIN — Medication 50 MILLIGRAM(S): at 07:31

## 2019-05-29 NOTE — H&P ADULT - HISTORY OF PRESENT ILLNESS
75yo female w/ PMH of COPD, cardiomyopathy, htn, recent 2 admission for angioedema (discharged yesterday) p/w angiedema. Pt was admitted     74yF pmhx PVD, COPD ( cpap - Dr louis), Caridomyopathy on lasix 20 mg daily (cards: Dr jaime), HTN  -  2 admissions  for angioedema. -  dcd today -  Per  records -  pt  admitted 5 weeks ago  for angioedema to ICU - dcd from Lisinopril- HCTZ,    most recent 5/22/19 -  possibel cause:  polurethane,  gabapentin,  verapamil - all were  stopped -   Pt had prolonged stay  2.2 pulm mildred a-  lasox increased to 40mg  whle in hopsital , and had UTI - finished abx while in hospital,  had  chestp pain with trop engative,  echo 60% ef, CTA no PE -  b/l pleural effusions - -  dcd today  at 3pm with prednisone  taper . Pt here today as she was home eating dinner - macaroni -  and  felt  tongue numbness -  took benadryl and had improvement -  Pt  called EMS as she had b/l  feet numbness -   per   pat had this in the past  and never  followed up with  [facility]  vascular -  had  r/o  dvt  but  did not follow with further studies  Pt uses walker   for knee pain -   P called EMS  for  numbness feet  - walked to ambulance -   while in ambulance  pt had  tongue swelling.  no chest pain  sob - took her prednisone tonight  and the rest of her meds -  pt says this is the same swelling she had  when she was admitted  recently    Anaphylaxis: now resolved  Acute hypoxic respiratory failure due to pulmonary edema: resolved  Urinary tract Infection - e-coli sensitive to Rocephin-->completed 5 day course today   COPD  HTN  former smoker 75yo female w/ PMH of COPD, cardiomyopathy, htn, recent 2 admissions for angioedema (discharged yesterday) p/w another angiedema. Pt was admitted to ICU on 5/22/2019 for anaphylaxis for possible causes: polurethane, gabapentin, verapamil, which all were stopped. Pt developed pulmonary edema (started on lasix) and UTI (finished 5 days of rocephin) that prolonged her stay. Pt was discharged yesterday at 3pm, went home, and had dinner (macaroni) at home w/ . Pt reports having tongue swelling after eating dinner. Pt took benedryl w/ moderate relief. Soon after, pt had b/l lower extremities paresthesia, and called EMS. During transport to ED, pt had another tongue swelling. Pt reports the tongue swelling being similar to the one during previous hospitalization. In ED, pt received 2 doses of IM epinephrine w/ moderate relief. Currently, pt is resting comfortably in ED. Pt reports having mild SOB pb pain in b/l lower extremities. Pt denies any fever/chills, HA, dizziness, SOB, CP, abd pain, N/V/D, or swelling. Pt denies taking any new medications or foods/drinks since discharge.

## 2019-05-29 NOTE — ED ADULT NURSE REASSESSMENT NOTE - NSIMPLEMENTINTERV_GEN_ALL_ED
Implemented All Fall with Harm Risk Interventions:  [place] to call system. Call bell, personal items and telephone within reach. Instruct patient to call for assistance. Room bathroom lighting operational. Non-slip footwear when patient is off stretcher. Physically safe environment: no spills, clutter or unnecessary equipment. Stretcher in lowest position, wheels locked, appropriate side rails in place. Provide visual cue, wrist band, yellow gown, etc. Monitor gait and stability. Monitor for mental status changes and reorient to person, place, and time. Review medications for side effects contributing to fall risk. Reinforce activity limits and safety measures with patient and family. Provide visual clues: red socks.

## 2019-05-29 NOTE — CHART NOTE - NSCHARTNOTEFT_GEN_A_CORE
Admission Summary   75yo female w/ PMH of COPD, cardiomyopathy, htn, recent 2 admissions for angioedema (discharged yesterday) p/w another angioedema. Pt was admitted to ICU on 5/22/2019 for anaphylaxis for possible causes: , gabapentin, which was stopped. Pt was discharged yesterday at 3pm, went home, and had dinner (macaroni) at home w/ . Pt reports having tongue swelling after eating dinner. Pt took benedryl w/ moderate relief. Soon after, pt had b/l lower extremities paresthesia, and called EMS. During transport to ED, pt had another tongue swelling. Pt reports the tongue swelling being similar to the one during previous hospitalization. In ED, pt received 2 doses of IM epinephrine w/ moderate relief.   . This morning she is comfortably resting in bed. Reports that tongue swelling has subsided but has persistent parasthesias in LE.    ICU Vital Signs Last 24 Hrs  T(C): 36.2 (29 May 2019 11:00), Max: 36.8 (29 May 2019 07:22)  T(F): 97.2 (29 May 2019 11:00), Max: 98.3 (29 May 2019 07:22)  HR: 65 (29 May 2019 11:10) (63 - 80)  BP: 135/63 (29 May 2019 09:05) (111/53 - 144/63)  BP(mean): 90 (29 May 2019 09:05) (77 - 90)  ABP: --  ABP(mean): --  RR: 31 (29 May 2019 11:10) (14 - 51)  SpO2: 97% (29 May 2019 11:10) (95% - 99%)    I&O's Summary    28 May 2019 07:01  -  29 May 2019 07:00  --------------------------------------------------------  IN: 100 mL / OUT: 0 mL / NET: 100 mL    29 May 2019 07:01  -  29 May 2019 12:23  --------------------------------------------------------  IN: 30 mL / OUT: 410 mL / NET: -380 mL          LABS:                        11.5   10.71 )-----------( 242      ( 29 May 2019 05:39 )             36.2     05-29    142  |  102  |  36<H>  ----------------------------<  149<H>  4.1   |  27  |  1.0    Ca    9.1      29 May 2019 05:39  Phos  3.7     05-29  Mg     2.2     05-29    TPro  5.7<L>  /  Alb  3.3<L>  /  TBili  0.3  /  DBili  x   /  AST  22  /  ALT  23  /  AlkPhos  70  05-29    RADIOLOGY & ADDITIONAL TESTS:    < from: Xray Chest 1 View- PORTABLE-Routine (05.28.19 @ 22:58) >    Right opacity, stable. Left opacity, increased.    < end of copied text >    Consultant(s) Notes Reviewed:  [x ] YES  [ ] NO    MEDICATIONS  (STANDING):  aspirin  chewable 81 milliGRAM(s) Oral daily  atorvastatin 80 milliGRAM(s) Oral at bedtime  chlorhexidine 4% Liquid 1 Application(s) Topical two times a day  citalopram 30 milliGRAM(s) Oral daily  DULoxetine 20 milliGRAM(s) Oral daily  famotidine  IVPB 40 milliGRAM(s) IV Intermittent daily  furosemide    Tablet 20 milliGRAM(s) Oral daily  heparin  Injectable 5000 Unit(s) SubCutaneous every 8 hours  metoprolol tartrate 25 milliGRAM(s) Oral two times a day  predniSONE   Tablet 40 milliGRAM(s) Oral daily  silver sulfADIAZINE 1% Cream 1 Application(s) Topical every 12 hours    MEDICATIONS  (PRN):  acetaminophen   Tablet .. 650 milliGRAM(s) Oral every 6 hours PRN Mild Pain (1 - 3)  ALBUTerol    90 MICROgram(s) HFA Inhaler 1 Puff(s) Inhalation every 6 hours PRN Shortness of Breath and/or Wheezing  diphenhydrAMINE   Injectable 50 milliGRAM(s) IntraMuscular every 8 hours PRN Rash and/or Itching  guaiFENesin    Syrup 100 milliGRAM(s) Oral every 6 hours PRN Cough  LORazepam     Tablet 0.5 milliGRAM(s) Oral two times a day PRN Agitation      PHYSICAL EXAM:  GENERAL: well built, well nourished  HEAD:  Atraumatic, Normocephalic  EYES: EOMI, PERRLA, conjunctiva and sclera clear  ENT: No tongue swelling at this point  NECK: Supple, No JVD, Normal thyroid, no enlarged nodes  NERVOUS SYSTEM:  Alert & Oriented X3, Good concentration; Motor Strength 5/5 B/L upper and lower extremities; DTRs 2+ intact and symmetric, sensory intact  CHEST/LUNG: B/L good air entry; Bibasilar minimal crackles  HEART: S1S2 normal, no S3, Regular rate and rhythm; No murmurs, rubs, or gallops  ABDOMEN: Soft, Nontender, Nondistended; Bowel sounds present  EXTREMITIES:  2+ Peripheral Pulses, No clubbing, cyanosis, or edema  LYMPH: No lymphadenopathy noted  SKIN: No rashes or lesions    Care Discussed with Consultants/Other Providers [ x] YES  [ ] NO    A/P    Angioedema/Anaphylaxis  LE parasthesias  COPD  COPD  HTN    #Angioedema: Now resolved  taper off steroids  iv benadryl as needed  advance diet  Allergy consult     #LE parasthesias  started on cymbalta  pt was on gabapentin which was recently discontinued for concern of anaphylaxis  LE duplex: prelim negative    #COPD  steroids taper  nebs prn    #HTN  c/w current home meds    #CHG 4% bath daily and prn  DVT PPX:Lovenox  DISPO:from home  DIET:dash  Activity:OBC  Need social work evaluation for personal issues at home  Patient is hemodynamically stable to be downgraded to general medical floor Admission Summary   75yo female w/ PMH of COPD, cardiomyopathy, htn, recent 2 admissions for angioedema (discharged yesterday) p/w another angioedema. Pt was admitted to ICU on 5/22/2019 for anaphylaxis for possible causes: , gabapentin, which was stopped. Pt was discharged yesterday at 3pm, went home, and had dinner (macaroni) at home w/ . Pt reports having tongue swelling after eating dinner. Pt took benedryl w/ moderate relief. Soon after, pt had b/l lower extremities paresthesia, and called EMS. During transport to ED, pt had another tongue swelling. Pt reports the tongue swelling being similar to the one during previous hospitalization. In ED, pt received 2 doses of IM epinephrine w/ moderate relief.   . This morning she is comfortably resting in bed. Reports that tongue swelling has subsided but has persistent parasthesias in LE.    ICU Vital Signs Last 24 Hrs  T(C): 36.2 (29 May 2019 11:00), Max: 36.8 (29 May 2019 07:22)  T(F): 97.2 (29 May 2019 11:00), Max: 98.3 (29 May 2019 07:22)  HR: 65 (29 May 2019 11:10) (63 - 80)  BP: 135/63 (29 May 2019 09:05) (111/53 - 144/63)  BP(mean): 90 (29 May 2019 09:05) (77 - 90)  ABP: --  ABP(mean): --  RR: 31 (29 May 2019 11:10) (14 - 51)  SpO2: 97% (29 May 2019 11:10) (95% - 99%)    I&O's Summary    28 May 2019 07:01  -  29 May 2019 07:00  --------------------------------------------------------  IN: 100 mL / OUT: 0 mL / NET: 100 mL    29 May 2019 07:01  -  29 May 2019 12:23  --------------------------------------------------------  IN: 30 mL / OUT: 410 mL / NET: -380 mL          LABS:                        11.5   10.71 )-----------( 242      ( 29 May 2019 05:39 )             36.2     05-29    142  |  102  |  36<H>  ----------------------------<  149<H>  4.1   |  27  |  1.0    Ca    9.1      29 May 2019 05:39  Phos  3.7     05-29  Mg     2.2     05-29    TPro  5.7<L>  /  Alb  3.3<L>  /  TBili  0.3  /  DBili  x   /  AST  22  /  ALT  23  /  AlkPhos  70  05-29    RADIOLOGY & ADDITIONAL TESTS:    < from: Xray Chest 1 View- PORTABLE-Routine (05.28.19 @ 22:58) >    Right opacity, stable. Left opacity, increased.    < end of copied text >    Consultant(s) Notes Reviewed:  [x ] YES  [ ] NO    MEDICATIONS  (STANDING):  aspirin  chewable 81 milliGRAM(s) Oral daily  atorvastatin 80 milliGRAM(s) Oral at bedtime  chlorhexidine 4% Liquid 1 Application(s) Topical two times a day  citalopram 30 milliGRAM(s) Oral daily  DULoxetine 20 milliGRAM(s) Oral daily  famotidine  IVPB 40 milliGRAM(s) IV Intermittent daily  furosemide    Tablet 20 milliGRAM(s) Oral daily  heparin  Injectable 5000 Unit(s) SubCutaneous every 8 hours  metoprolol tartrate 25 milliGRAM(s) Oral two times a day  predniSONE   Tablet 40 milliGRAM(s) Oral daily  silver sulfADIAZINE 1% Cream 1 Application(s) Topical every 12 hours    MEDICATIONS  (PRN):  acetaminophen   Tablet .. 650 milliGRAM(s) Oral every 6 hours PRN Mild Pain (1 - 3)  ALBUTerol    90 MICROgram(s) HFA Inhaler 1 Puff(s) Inhalation every 6 hours PRN Shortness of Breath and/or Wheezing  diphenhydrAMINE   Injectable 50 milliGRAM(s) IntraMuscular every 8 hours PRN Rash and/or Itching  guaiFENesin    Syrup 100 milliGRAM(s) Oral every 6 hours PRN Cough  LORazepam     Tablet 0.5 milliGRAM(s) Oral two times a day PRN Agitation      PHYSICAL EXAM:  GENERAL: well built, well nourished  HEAD:  Atraumatic, Normocephalic  EYES: EOMI, PERRLA, conjunctiva and sclera clear  ENT: No tongue swelling at this point  NECK: Supple, No JVD, Normal thyroid, no enlarged nodes  NERVOUS SYSTEM:  Alert & Oriented X3, Good concentration; Motor Strength 5/5 B/L upper and lower extremities; DTRs 2+ intact and symmetric, sensory intact  CHEST/LUNG: B/L good air entry; Bibasilar minimal crackles  HEART: S1S2 normal, no S3, Regular rate and rhythm; No murmurs, rubs, or gallops  ABDOMEN: Soft, Nontender, Nondistended; Bowel sounds present  EXTREMITIES:  2+ Peripheral Pulses, No clubbing, cyanosis, or edema  LYMPH: No lymphadenopathy noted  SKIN: No rashes or lesions    Care Discussed with Consultants/Other Providers [ x] YES  [ ] NO    A/P    Angioedema/Anaphylaxis  LE parasthesias  COPD  COPD  HTN    #Angioedema: Now resolved  taper off steroids  iv benadryl as needed  advance diet  Allergy consult   will order allergen test : RAST     #LE parasthesias  started on cymbalta  pt was on gabapentin which was recently discontinued for concern of anaphylaxis  LE duplex: prelim negative    #COPD  steroids taper  nebs prn    #HTN  c/w current home meds    #CHG 4% bath daily and prn  DVT PPX:Lovenox  DISPO:from home  DIET:dash  Activity:OBC  Need social work evaluation for personal issues at home  Patient is hemodynamically stable to be downgraded to general medical floor Admission Summary   75yo female w/ PMH of COPD, cardiomyopathy, htn, recent 2 admissions for angioedema (discharged yesterday) p/w another angioedema. Pt was admitted to ICU on 5/22/2019 for anaphylaxis for possible causes: , gabapentin, which was stopped. Pt was discharged yesterday at 3pm, went home, and had dinner (macaroni) at home w/ . Pt reports having tongue swelling after eating dinner. Pt took benedryl w/ moderate relief. Soon after, pt had b/l lower extremities paresthesia, and called EMS. During transport to ED, pt had another tongue swelling. Pt reports the tongue swelling being similar to the one during previous hospitalization. In ED, pt received 2 doses of IM epinephrine w/ moderate relief.   . This morning she is comfortably resting in bed. Reports that tongue swelling has subsided but has persistent parasthesias in LE.    ICU Vital Signs Last 24 Hrs  T(C): 36.2 (29 May 2019 11:00), Max: 36.8 (29 May 2019 07:22)  T(F): 97.2 (29 May 2019 11:00), Max: 98.3 (29 May 2019 07:22)  HR: 65 (29 May 2019 11:10) (63 - 80)  BP: 135/63 (29 May 2019 09:05) (111/53 - 144/63)  BP(mean): 90 (29 May 2019 09:05) (77 - 90)  ABP: --  ABP(mean): --  RR: 31 (29 May 2019 11:10) (14 - 51)  SpO2: 97% (29 May 2019 11:10) (95% - 99%)    I&O's Summary    28 May 2019 07:01  -  29 May 2019 07:00  --------------------------------------------------------  IN: 100 mL / OUT: 0 mL / NET: 100 mL    29 May 2019 07:01  -  29 May 2019 12:23  --------------------------------------------------------  IN: 30 mL / OUT: 410 mL / NET: -380 mL          LABS:                        11.5   10.71 )-----------( 242      ( 29 May 2019 05:39 )             36.2     05-29    142  |  102  |  36<H>  ----------------------------<  149<H>  4.1   |  27  |  1.0    Ca    9.1      29 May 2019 05:39  Phos  3.7     05-29  Mg     2.2     05-29    TPro  5.7<L>  /  Alb  3.3<L>  /  TBili  0.3  /  DBili  x   /  AST  22  /  ALT  23  /  AlkPhos  70  05-29    RADIOLOGY & ADDITIONAL TESTS:    < from: Xray Chest 1 View- PORTABLE-Routine (05.28.19 @ 22:58) >    Right opacity, stable. Left opacity, increased.    < end of copied text >    Consultant(s) Notes Reviewed:  [x ] YES  [ ] NO    MEDICATIONS  (STANDING):  aspirin  chewable 81 milliGRAM(s) Oral daily  atorvastatin 80 milliGRAM(s) Oral at bedtime  chlorhexidine 4% Liquid 1 Application(s) Topical two times a day  citalopram 30 milliGRAM(s) Oral daily  DULoxetine 20 milliGRAM(s) Oral daily  famotidine  IVPB 40 milliGRAM(s) IV Intermittent daily  furosemide    Tablet 20 milliGRAM(s) Oral daily  heparin  Injectable 5000 Unit(s) SubCutaneous every 8 hours  metoprolol tartrate 25 milliGRAM(s) Oral two times a day  predniSONE   Tablet 40 milliGRAM(s) Oral daily  silver sulfADIAZINE 1% Cream 1 Application(s) Topical every 12 hours    MEDICATIONS  (PRN):  acetaminophen   Tablet .. 650 milliGRAM(s) Oral every 6 hours PRN Mild Pain (1 - 3)  ALBUTerol    90 MICROgram(s) HFA Inhaler 1 Puff(s) Inhalation every 6 hours PRN Shortness of Breath and/or Wheezing  diphenhydrAMINE   Injectable 50 milliGRAM(s) IntraMuscular every 8 hours PRN Rash and/or Itching  guaiFENesin    Syrup 100 milliGRAM(s) Oral every 6 hours PRN Cough  LORazepam     Tablet 0.5 milliGRAM(s) Oral two times a day PRN Agitation      PHYSICAL EXAM:  GENERAL: well built, well nourished  HEAD:  Atraumatic, Normocephalic  EYES: EOMI, PERRLA, conjunctiva and sclera clear  ENT: No tongue swelling at this point  NECK: Supple, No JVD, Normal thyroid, no enlarged nodes  NERVOUS SYSTEM:  Alert & Oriented X3, Good concentration; Motor Strength 5/5 B/L upper and lower extremities; DTRs 2+ intact and symmetric, sensory intact  CHEST/LUNG: B/L good air entry; Bibasilar minimal crackles  HEART: S1S2 normal, no S3, Regular rate and rhythm; No murmurs, rubs, or gallops  ABDOMEN: Soft, Nontender, Nondistended; Bowel sounds present  EXTREMITIES:  2+ Peripheral Pulses, No clubbing, cyanosis, or edema  LYMPH: No lymphadenopathy noted  SKIN: No rashes or lesions    Care Discussed with Consultants/Other Providers [ x] YES  [ ] NO    A/P    Angioedema/Anaphylaxis  LE parasthesias  COPD  COPD  HTN    #Angioedema: Now resolved  taper off steroids  iv benadryl as needed  advance diet  Allergy consult   will order allergen test : RAST     #LE parasthesias  started on cymbalta  pt was on gabapentin which was recently discontinued for concern of anaphylaxis  LE duplex: prelim negative    #COPD  steroids taper  nebs prn    #HTN  c/w current home meds    #CHG 4% bath daily and prn  DVT PPX:Lovenox  DISPO:from home  DIET:dash  Activity:OBC  Need social work evaluation for personal issues at home  Patient is hemodynamically stable to be downgraded to general medical floor    ATTENDING:  Patient seen and evaluated independently medical resident note reviewed, I agree with plan and management, except as I have noted.

## 2019-05-29 NOTE — PROGRESS NOTE ADULT - ASSESSMENT
IMPRESSION:  anaphylaxis shock?  I do not feel that the patient has return of the angioedema     PLAN:    CNS: no sedation       HEENT: oral care     PULMONARY    finish prednisonetaper   nebulizer Q4 hrs      CARDIOVASCULAR: keep Is <  os   give lasix PRN  follow cardiology     GI: GI prophylaxis. nutrition    RENAL: follow lytes   supplement K >4.0    INFECTIOUS DISEASE: no abx for now     HEMATOLOGICAL:  DVT prophylaxis.    ENDOCRINE:  Follow up FS.  Insulin protocol if needed.    MUSCULOSKELETAL: OOB to chair    OK to downgrade to [facility]    Nees psych and social work evaluation. Apparently according to nursing staff  the patient is fighting with  and daughter constantly during previous admission and that may have prompted return to the hospital./

## 2019-05-29 NOTE — H&P ADULT - NSHPPHYSICALEXAM_GEN_ALL_CORE
ICU Vital Signs Last 24 Hrs  T(C): 36.2 (29 May 2019 02:24), Max: 36.9 (28 May 2019 07:32)  T(F): 97.2 (29 May 2019 02:24), Max: 98.5 (28 May 2019 07:32)  HR: 70 (29 May 2019 01:46) (63 - 80)  BP: 118/58 (29 May 2019 01:46) (111/53 - 135/60)  BP(mean): 77 (28 May 2019 13:26) (77 - 87)  RR: 18 (29 May 2019 01:46) (17 - 51)  SpO2: 95% (29 May 2019 01:46) (95% - 99%)        Physical Examination:    General: No acute distress.  Alert, oriented, interactive, nonfocal    HEENT: +angioedema. no drooling, no stridor, no change in voice. Pupils equal, reactive to light.  Symmetric.    PULM: Clear to auscultation bilaterally, no significant sputum production    CVS: Regular rate and rhythm, no murmurs, rubs, or gallops    ABD: Soft, nondistended, nontender, normoactive bowel sounds, no masses    EXT: b/l 2+ anterior tibial, dorsalis pedis pulse noted. good capillary refill noted b/l. diffuse TTP noted b/l.     SKIN: Warm and well perfused, no rashes noted.

## 2019-05-29 NOTE — PROGRESS NOTE ADULT - SUBJECTIVE AND OBJECTIVE BOX
Patient is a 74y old  Female who presents with a chief complaint of Angioedema (29 May 2019 07:22)        Interval Events: The patient went home and returned 3 hours later. She states that she went home, ate macaroni and soon thereafter felt tongue tingling and then tingling in her legs. She felt that the angioedema was returning. She called EMS ans was brought to the ED.    REVIEW OF SYSTEMS:  Constitutional: No fevers or chills. No weight loss. No fatigue or generalized malaise.  Eyes: No itching or discharge from the eyes  ENT: No ear pain. No ear discharge. No nasal congestion. No post nasal drip. No epistaxis. No throat pain. No sore throat. +tongue swelling?  CV: No chest pain. No palpitations. No lightheadedness or dizziness.   Resp: No dyspnea at rest. No dyspnea on exertion. No orthopnea. No wheezing. No cough. No stridor. No sputum production. No chest pain with respiration.  GI: No nausea. No vomiting. No diarrhea.  MSK: No joint pain or pain in any extremities  Integumentary: No skin lesions. No pedal edema.  Neurological: No gross motor weakness. No sensory changes.      OBJECTIVE:  ICU Vital Signs Last 24 Hrs  T(C): 36.8 (29 May 2019 07:22), Max: 36.8 (29 May 2019 07:22)  T(F): 98.3 (29 May 2019 07:22), Max: 98.3 (29 May 2019 07:22)  HR: 67 (29 May 2019 09:10) (63 - 80)  BP: 135/63 (29 May 2019 09:05) (111/53 - 144/63)  BP(mean): 90 (29 May 2019 09:05) (77 - 90)    RR: 19 (29 May 2019 09:10) (14 - 51)  SpO2: 98% (29 May 2019 09:10) (95% - 99%)        05-28 @ 07:01  -  05-29 @ 07:00  --------------------------------------------------------  IN: 100 mL / OUT: 0 mL / NET: 100 mL    05-29 @ 07:01 - 05-29 @ 11:04  --------------------------------------------------------  IN: 30 mL / OUT: 380 mL / NET: -350 mL      PHYSICAL EXAM:  General: Awake, alert, oriented X 3.   HEENT: Atraumatic, normocephalic. tongue does not appear swollen, no lip swelling                Mallampatti Grade                 No nasal congestion.                No tonsillar or pharyngeal exudates.  Lymph Nodes: No palpable lymphadenopathy neck, supraclavicular region  Neck: No JVD. No carotid bruit, no thyromegaly  Respiratory: Normal chest expansion                         Normal percussion                         Normal and equal air entry                         No wheeze, rhonchi or rales.  Cardiovascular: S1 S2 normal. No murmurs, rubs or gallops.   Abdomen: Soft, non-tender, non-distended. No organomegaly.  Extremities: Warm to touch. Peripheral pulse palpable. No pedal edema.   Skin: No rashes or skin lesions, warm dry  Neurological: Motor and sensory examination equal and normal in all four extremities.  Psychiatry: Appropriate mood and affect.    HOSPITAL MEDICATIONS:  MEDICATIONS  (STANDING):  aspirin  chewable 81 milliGRAM(s) Oral daily  atorvastatin 80 milliGRAM(s) Oral at bedtime  chlorhexidine 4% Liquid 1 Application(s) Topical two times a day  citalopram 30 milliGRAM(s) Oral daily  DULoxetine 20 milliGRAM(s) Oral daily  famotidine  IVPB 40 milliGRAM(s) IV Intermittent daily  furosemide    Tablet 20 milliGRAM(s) Oral daily  heparin  Injectable 5000 Unit(s) SubCutaneous every 8 hours  methylPREDNISolone sodium succinate Injectable 125 milliGRAM(s) IV Push every 6 hours  metoprolol tartrate 25 milliGRAM(s) Oral two times a day  silver sulfADIAZINE 1% Cream 1 Application(s) Topical every 12 hours    MEDICATIONS  (PRN):  acetaminophen   Tablet .. 650 milliGRAM(s) Oral every 6 hours PRN Mild Pain (1 - 3)  ALBUTerol    90 MICROgram(s) HFA Inhaler 1 Puff(s) Inhalation every 6 hours PRN Shortness of Breath and/or Wheezing  diphenhydrAMINE   Injectable 50 milliGRAM(s) IntraMuscular every 8 hours PRN Rash and/or Itching  guaiFENesin    Syrup 100 milliGRAM(s) Oral every 6 hours PRN Cough      LABS:                        11.5   10.71 )-----------( 242      ( 29 May 2019 05:39 )             36.2     05-29    142  |  102  |  36<H>  ----------------------------<  149<H>  4.1   |  27  |  1.0    Ca    9.1      29 May 2019 05:39  Phos  3.7     05-29  Mg     2.2     05-29    TPro  5.7<L>  /  Alb  3.3<L>  /  TBili  0.3  /  DBili  x   /  AST  22  /  ALT  23  /  AlkPhos  70  05-29                      RADIOLOGY:Radiology personally reviewed.

## 2019-05-29 NOTE — H&P ADULT - NSHPLABSRESULTS_GEN_ALL_CORE
I&O's Detail      LABS:                        12.1   10.35 )-----------( 248      ( 28 May 2019 21:50 )             37.8     28 May 2019 21:50    139    |  99     |  38     ----------------------------<  101    4.7     |  27     |  1.0      Ca    9.0        28 May 2019 21:50  Mg     2.0       28 May 2019 05:41    TPro  6.3    /  Alb  3.5    /  TBili  0.3    /  DBili  x      /  AST  34     /  ALT  24     /  AlkPhos  70     28 May 2019 21:50  Amylase x     lipase x            CAPILLARY BLOOD GLUCOSE        Culture      Radiology  < from: Xray Chest 1 View- PORTABLE-Routine (05.28.19 @ 06:07) >    Impression:      Bilateral opacities, decreased.    < end of copied text >
normal...

## 2019-05-29 NOTE — H&P ADULT - ASSESSMENT
73yo female w/ PMH of COPD on CPAP, cardiomyopathy, htn, recent 2 admission for angioedema (discharged yesterday) p/w angiedema.    Discussed w/ Dr. Van. Admit to ICU    Angioedema  - Unknown source. Pt did not take any new medications or foods  - IV solumedrol  - IV Famotidine  - IV benadryl  - NPO for now due to tongue swelling. Advance diet as tongue swelling goes down.     HTN  - c/w lasix, metoprolol    DLD  - c/w lipitor    FLAVIO  - c/w CPAP at night      DVT prophylaxis 73yo female w/ PMH of COPD, cardiomyopathy, htn, recent 2 admissions for angioedema (discharged yesterday) p/w another angiedema.     Discussed w/ Dr. Van. Admit to ICU    Angioedema  - Unknown source. Pt did not take any new medications or foods  - IV solumedrol  - IV Famotidine  - IV benadryl  - NPO for now due to tongue swelling. Advance diet as tongue swelling goes down.   - f/u lytes    HTN  - c/w lasix, metoprolol    DLD  - c/w lipitor    FLAVIO  - c/w CPAP at night      DVT prophylaxis 73yo female w/ PMH of COPD, cardiomyopathy, htn, recent 2 admissions for angioedema (discharged yesterday) p/w another angiedema.     Discussed w/ Dr. Van. Admit to ICU    Angioedema  - Unknown source. Pt did not take any new medications or foods  - IV solumedrol  - IV Famotidine  - IV benadryl  - NPO for now due to tongue swelling. Advance diet as tongue swelling goes down.   - f/u lytes    Bilateral lower extremities pain  - 2+ AT and DP pulses noted. No cyanosis or cooling of LE noted.  - duplex US to r/o DVT     HTN  - c/w lasix, metoprolol    DLD  - c/w lipitor    FLAVIO  - c/w CPAP at night      DVT prophylaxis

## 2019-05-29 NOTE — CONSULT NOTE ADULT - ASSESSMENT
Patient with no sob now. CXR reportedly improved. Event does not appear cardiac. Would doppler legs. Check d-dimer. ? neuro re parasthenia leg

## 2019-05-29 NOTE — H&P ADULT - NSICDXPASTMEDICALHX_GEN_ALL_CORE_FT
PAST MEDICAL HISTORY:  Allergic reaction     COPD (chronic obstructive pulmonary disease)     Depression     Hypertension     Other cardiomyopathy     Other emphysema     PVD (peripheral vascular disease)     TIA (transient ischemic attack)

## 2019-05-29 NOTE — CONSULT NOTE ADULT - SUBJECTIVE AND OBJECTIVE BOX
CARDIOLOGY CONSULT NOTE     CHIEF COMPLAINT/REASON FOR CONSULT:    HPI:  75yo female w/ PMH of COPD, cardiomyopathy, htn, recent 2 admissions for angioedema (discharged yesterday) p/w another angiedema. Pt was admitted to ICU on 5/22/2019 for anaphylaxis for possible causes: polurethane, gabapentin, verapamil, which all were stopped. Pt developed pulmonary edema (started on lasix) and UTI (finished 5 days of rocephin) that prolonged her stay. Pt was discharged yesterday at 3pm, went home, and had dinner (macaroni) at home w/ . Pt reports having tongue swelling after eating dinner. Pt took benedryl w/ moderate relief. Soon after, pt had b/l lower extremities paresthesia, and called EMS. During transport to ED, pt had another tongue swelling. Pt reports the tongue swelling being similar to the one during previous hospitalization. In ED, pt received 2 doses of IM epinephrine w/ moderate relief. Currently, pt is resting comfortably in ED. Pt reports having mild SOB pb pain in b/l lower extremities. Pt denies any fever/chills, HA, dizziness, SOB, CP, abd pain, N/V/D, or swelling. Pt denies taking any new medications or foods/drinks since discharge. (29 May 2019 02:31)      PAST MEDICAL & SURGICAL HISTORY:  Allergic reaction  PVD (peripheral vascular disease)  Other emphysema  Other cardiomyopathy  TIA (transient ischemic attack)  COPD (chronic obstructive pulmonary disease)  Depression  Hypertension  History of cholecystectomy      Cardiac Risks:   [x ]HTN, [ ] DM, [ ] Smoking, [ ] FH,  [ ] Lipids        MEDICATIONS:  MEDICATIONS  (STANDING):  aspirin  chewable 81 milliGRAM(s) Oral daily  atorvastatin 80 milliGRAM(s) Oral at bedtime  chlorhexidine 4% Liquid 1 Application(s) Topical two times a day  citalopram 30 milliGRAM(s) Oral daily  famotidine  IVPB 40 milliGRAM(s) IV Intermittent daily  furosemide    Tablet 20 milliGRAM(s) Oral daily  heparin  Injectable 5000 Unit(s) SubCutaneous every 8 hours  methylPREDNISolone sodium succinate Injectable 125 milliGRAM(s) IV Push every 6 hours  metoprolol tartrate 25 milliGRAM(s) Oral two times a day  silver sulfADIAZINE 1% Cream 1 Application(s) Topical every 12 hours      FAMILY HISTORY:  No pertinent family history in first degree relatives      SOCIAL HISTORY:      [ ] Marital status   Allergies    codeine (Other (Moderate))  Depakote (Unknown)  losartan (Angioedema)  verapamil (Short breath; Angioedema)      	    REVIEW OF SYSTEMS:  CONSTITUTIONAL: No fever, weight loss, or fatigue  EYES: No eye pain, visual disturbances, or discharge  ENMT:  No difficulty hearing, tinnitus, vertigo; No sinus or throat pain  NECK: No pain or stiffness  RESPIRATORY: No cough, wheezing, chills or hemoptysis; No Shortness of Breath  CARDIOVASCULAR: No chest pain, palpitations, passing out, dizziness, or leg swelling  GASTROINTESTINAL: No abdominal or epigastric pain. No nausea, vomiting, or hematemesis; No diarrhea or constipation. No melena or hematochezia.  GENITOURINARY: No dysuria, frequency, hematuria, or incontinence  NEUROLOGICAL: No headaches, memory loss, loss of strength, numbness, or tremors  SKIN: No itching, burning, rashes, or lesions   	        PHYSICAL EXAM:  T(C): 36.3 (05-29-19 @ 03:00), Max: 36.9 (05-28-19 @ 07:32)  HR: 71 (05-29-19 @ 06:00) (63 - 80)  BP: 120/65 (05-29-19 @ 06:00) (111/53 - 144/63)  RR: 30 (05-29-19 @ 06:00) (16 - 51)  SpO2: 99% (05-29-19 @ 06:00) (95% - 99%)  Wt(kg): --  I&O's Summary    28 May 2019 07:01  -  29 May 2019 07:00  --------------------------------------------------------  IN: 100 mL / OUT: 0 mL / NET: 100 mL        Appearance: Normal	  Psychiatry: A & O x 3, Mood & affect appropriate  HEENT:   Normal oral mucosa, PERRL, EOMI	  Lymphatic: No lymphadenopathy  Cardiovascular: Normal S1 S2,RRR, No JVD, No murmurs  Respiratory: Lungs clear to auscultation	  Gastrointestinal:  Soft, Non-tender, + BS	  Skin: No rashes, No ecchymoses, No cyanosis	  Neurologic: Non-focal  Extremities: Normal range of motion, No clubbing, cyanosis or edema  Vascular: Peripheral pulses palpable 2+ bilaterally      ECG:  	< from: 12 Lead ECG (05.27.19 @ 09:11) >    Diagnosis Line Normal sinus rhythm  Possible Left atrial enlargement  Borderline ECG    Confirmed by VIVIENNE HAQUE MD ([id number]) on 5/27/2019 9:27:27 AM    < end of copied text >      	  LABS:	 	    CARDIAC MARKERS:                                    11.5   10.71 )-----------( 242      ( 29 May 2019 05:39 )             36.2     05-29    142  |  102  |  36<H>  ----------------------------<  149<H>  4.1   |  27  |  1.0    Ca    9.1      29 May 2019 05:39  Phos  3.7     05-29  Mg     2.2     05-29    TPro  5.7<L>  /  Alb  3.3<L>  /  TBili  0.3  /  DBili  x   /  AST  22  /  ALT  23  /  AlkPhos  70  05-29

## 2019-05-30 LAB
ANION GAP SERPL CALC-SCNC: 11 MMOL/L — SIGNIFICANT CHANGE UP (ref 7–14)
BUN SERPL-MCNC: 44 MG/DL — HIGH (ref 10–20)
CALCIUM SERPL-MCNC: 9.6 MG/DL — SIGNIFICANT CHANGE UP (ref 8.5–10.1)
CHLORIDE SERPL-SCNC: 100 MMOL/L — SIGNIFICANT CHANGE UP (ref 98–110)
CO2 SERPL-SCNC: 32 MMOL/L — SIGNIFICANT CHANGE UP (ref 17–32)
CREAT SERPL-MCNC: 1 MG/DL — SIGNIFICANT CHANGE UP (ref 0.7–1.5)
GLUCOSE SERPL-MCNC: 88 MG/DL — SIGNIFICANT CHANGE UP (ref 70–99)
HCT VFR BLD CALC: 40.3 % — SIGNIFICANT CHANGE UP (ref 37–47)
HGB BLD-MCNC: 12.5 G/DL — SIGNIFICANT CHANGE UP (ref 12–16)
IGE SERPL-ACNC: 26 IU/ML — SIGNIFICANT CHANGE UP
MCHC RBC-ENTMCNC: 28.6 PG — SIGNIFICANT CHANGE UP (ref 27–31)
MCHC RBC-ENTMCNC: 31 G/DL — LOW (ref 32–37)
MCV RBC AUTO: 92.2 FL — SIGNIFICANT CHANGE UP (ref 81–99)
NRBC # BLD: 0 /100 WBCS — SIGNIFICANT CHANGE UP (ref 0–0)
PLATELET # BLD AUTO: 249 K/UL — SIGNIFICANT CHANGE UP (ref 130–400)
POTASSIUM SERPL-MCNC: 4.9 MMOL/L — SIGNIFICANT CHANGE UP (ref 3.5–5)
POTASSIUM SERPL-SCNC: 4.9 MMOL/L — SIGNIFICANT CHANGE UP (ref 3.5–5)
RBC # BLD: 4.37 M/UL — SIGNIFICANT CHANGE UP (ref 4.2–5.4)
RBC # FLD: 15.2 % — HIGH (ref 11.5–14.5)
SODIUM SERPL-SCNC: 143 MMOL/L — SIGNIFICANT CHANGE UP (ref 135–146)
WBC # BLD: 11.16 K/UL — HIGH (ref 4.8–10.8)
WBC # FLD AUTO: 11.16 K/UL — HIGH (ref 4.8–10.8)

## 2019-05-30 PROCEDURE — 71045 X-RAY EXAM CHEST 1 VIEW: CPT | Mod: 26

## 2019-05-30 RX ORDER — DIPHENHYDRAMINE HCL 50 MG
50 CAPSULE ORAL EVERY 4 HOURS
Refills: 0 | Status: DISCONTINUED | OUTPATIENT
Start: 2019-05-30 | End: 2019-06-02

## 2019-05-30 RX ORDER — DIPHENHYDRAMINE HCL 50 MG
50 CAPSULE ORAL ONCE
Refills: 0 | Status: DISCONTINUED | OUTPATIENT
Start: 2019-05-30 | End: 2019-05-30

## 2019-05-30 RX ORDER — DIPHENHYDRAMINE HCL 50 MG
50 CAPSULE ORAL ONCE
Refills: 0 | Status: COMPLETED | OUTPATIENT
Start: 2019-05-30 | End: 2019-05-30

## 2019-05-30 RX ADMIN — Medication 50 MILLIGRAM(S): at 10:45

## 2019-05-30 RX ADMIN — CHLORHEXIDINE GLUCONATE 1 APPLICATION(S): 213 SOLUTION TOPICAL at 09:31

## 2019-05-30 RX ADMIN — Medication 25 MILLIGRAM(S): at 05:06

## 2019-05-30 RX ADMIN — HEPARIN SODIUM 5000 UNIT(S): 5000 INJECTION INTRAVENOUS; SUBCUTANEOUS at 14:18

## 2019-05-30 RX ADMIN — Medication 50 MILLIGRAM(S): at 06:20

## 2019-05-30 RX ADMIN — Medication 3 MILLILITER(S): at 19:35

## 2019-05-30 RX ADMIN — HEPARIN SODIUM 5000 UNIT(S): 5000 INJECTION INTRAVENOUS; SUBCUTANEOUS at 05:05

## 2019-05-30 RX ADMIN — Medication 0.5 MILLIGRAM(S): at 11:35

## 2019-05-30 RX ADMIN — Medication 20 MILLIGRAM(S): at 05:05

## 2019-05-30 RX ADMIN — PANTOPRAZOLE SODIUM 40 MILLIGRAM(S): 20 TABLET, DELAYED RELEASE ORAL at 06:33

## 2019-05-30 RX ADMIN — Medication 650 MILLIGRAM(S): at 08:20

## 2019-05-30 RX ADMIN — Medication 50 MILLIGRAM(S): at 23:21

## 2019-05-30 RX ADMIN — Medication 0.5 MILLIGRAM(S): at 21:32

## 2019-05-30 RX ADMIN — HEPARIN SODIUM 5000 UNIT(S): 5000 INJECTION INTRAVENOUS; SUBCUTANEOUS at 21:32

## 2019-05-30 RX ADMIN — DULOXETINE HYDROCHLORIDE 20 MILLIGRAM(S): 30 CAPSULE, DELAYED RELEASE ORAL at 14:18

## 2019-05-30 RX ADMIN — Medication 3 MILLILITER(S): at 04:38

## 2019-05-30 RX ADMIN — Medication 25 MILLIGRAM(S): at 17:24

## 2019-05-30 RX ADMIN — Medication 1 APPLICATION(S): at 17:24

## 2019-05-30 RX ADMIN — Medication 81 MILLIGRAM(S): at 11:35

## 2019-05-30 RX ADMIN — Medication 40 MILLIGRAM(S): at 05:06

## 2019-05-30 RX ADMIN — ATORVASTATIN CALCIUM 80 MILLIGRAM(S): 80 TABLET, FILM COATED ORAL at 21:32

## 2019-05-30 RX ADMIN — CITALOPRAM 30 MILLIGRAM(S): 10 TABLET, FILM COATED ORAL at 11:35

## 2019-05-30 RX ADMIN — Medication 1 APPLICATION(S): at 05:08

## 2019-05-30 NOTE — PROGRESS NOTE ADULT - SUBJECTIVE AND OBJECTIVE BOX
Patient  again this afternoon c/o tongue swelling, on exam no stridor or visible change in pt's entire tongue or  oropharynx        T(F): 98 (05-30-19 @ 14:12), Max: 98 (05-30-19 @ 14:12)  HR: 69 (05-30-19 @ 14:12)  BP: 116/55 (05-30-19 @ 14:12)  RR: 18  SpO2: 99% (05-30-19 @ 14:12) (99% - 99%)    PHYSICAL EXAM:  GENERAL: NAD  HEAD:  Atraumatic, Normocephalic  ENMT: No tonsillar erythema, exudates, or enlargement; Moist mucous membranes, Good dentition, No lesions  NERVOUS SYSTEM:  Alert & Oriented X3, no focal deficits  CHEST/LUNG: Clear to percussion bilaterally; No rales, rhonchi, wheezing, or rubs  HEART: Regular rate and rhythm; No murmurs, rubs, or gallops  ABDOMEN: Soft, Nontender, Nondistended; Bowel sounds present  EXTREMITIES:  2+ Peripheral Pulses, No clubbing, cyanosis, or edema  LYMPH: No lymphadenopathy noted  SKIN: No rashes or lesions    LABS  05-30    143  |  100  |  44<H>  ----------------------------<  88  4.9   |  32  |  1.0    Ca    9.6      30 May 2019 05:36  Phos  3.7     05-29  Mg     2.2     05-29    TPro  5.7<L>  /  Alb  3.3<L>  /  TBili  0.3  /  DBili  x   /  AST  22  /  ALT  23  /  AlkPhos  70  05-29                          12.5   11.16 )-----------( 249      ( 30 May 2019 05:36 )             40.3         CARDIAC ENZYMES      MEDICATIONS  (STANDING):  aspirin  chewable 81 milliGRAM(s) Oral daily  atorvastatin 80 milliGRAM(s) Oral at bedtime  chlorhexidine 4% Liquid 1 Application(s) Topical two times a day  citalopram 30 milliGRAM(s) Oral daily  DULoxetine 20 milliGRAM(s) Oral daily  furosemide    Tablet 20 milliGRAM(s) Oral daily  heparin  Injectable 5000 Unit(s) SubCutaneous every 8 hours  metoprolol tartrate 25 milliGRAM(s) Oral two times a day  pantoprazole    Tablet 40 milliGRAM(s) Oral before breakfast  predniSONE   Tablet 40 milliGRAM(s) Oral daily  silver sulfADIAZINE 1% Cream 1 Application(s) Topical every 12 hours    MEDICATIONS  (PRN):  acetaminophen   Tablet .. 650 milliGRAM(s) Oral every 6 hours PRN Mild Pain (1 - 3)  ALBUTerol    90 MICROgram(s) HFA Inhaler 1 Puff(s) Inhalation every 6 hours PRN Shortness of Breath and/or Wheezing  ALBUTerol/ipratropium for Nebulization 3 milliLiter(s) Nebulizer every 6 hours PRN Shortness of Breath and/or Wheezing  diphenhydrAMINE 50 milliGRAM(s) Oral every 4 hours PRN Rash and/or Itching and or /allergic symptoms  diphenhydrAMINE   Injectable 50 milliGRAM(s) IntraMuscular every 8 hours PRN Rash and/or Itching  guaiFENesin    Syrup 100 milliGRAM(s) Oral every 6 hours PRN Cough  LORazepam     Tablet 0.5 milliGRAM(s) Oral two times a day PRN Agitation  ondansetron Injectable 4 milliGRAM(s) IV Push every 6 hours PRN Nausea and/or Vomiting Patient  again this afternoon c/o tongue swelling, on exam no stridor or visible change in pt's entire tongue or  oropharynx        T(F): 98 (05-30-19 @ 14:12), Max: 98 (05-30-19 @ 14:12)  HR: 69 (05-30-19 @ 14:12)  BP: 116/55 (05-30-19 @ 14:12)  RR: 18  SpO2: 99% (05-30-19 @ 14:12) (99% - 99%)    PHYSICAL EXAM:  GENERAL: NAD  HEAD:  Atraumatic, Normocephalic  ENMT: No tonsillar erythema, exudates, or enlargement; Moist mucous membranes, Good dentition, No lesions  NERVOUS SYSTEM:  Alert & Oriented X3, no focal deficits  CHEST/LUNG: Clear to percussion bilaterally; No rales, rhonchi, wheezing, or rubs  HEART: Regular rate and rhythm; No murmurs, rubs, or gallops  ABDOMEN: Soft, Nontender, Nondistended; Bowel sounds present  EXTREMITIES:  2+ Peripheral Pulses, No clubbing, cyanosis, or edema  LYMPH: No lymphadenopathy noted  SKIN: No rashes or lesions    LABS  05-30    143  |  100  |  44<H>  ----------------------------<  88  4.9   |  32  |  1.0    Ca    9.6      30 May 2019 05:36  Phos  3.7     05-29  Mg     2.2     05-29    TPro  5.7<L>  /  Alb  3.3<L>  /  TBili  0.3  /  DBili  x   /  AST  22  /  ALT  23  /  AlkPhos  70  05-29                          12.5   11.16 )-----------( 249      ( 30 May 2019 05:36 )             40.3       MEDICATIONS  (STANDING):  aspirin  chewable 81 milliGRAM(s) Oral daily  atorvastatin 80 milliGRAM(s) Oral at bedtime  chlorhexidine 4% Liquid 1 Application(s) Topical two times a day  citalopram 30 milliGRAM(s) Oral daily  DULoxetine 20 milliGRAM(s) Oral daily  furosemide    Tablet 20 milliGRAM(s) Oral daily  heparin  Injectable 5000 Unit(s) SubCutaneous every 8 hours  metoprolol tartrate 25 milliGRAM(s) Oral two times a day  pantoprazole    Tablet 40 milliGRAM(s) Oral before breakfast  predniSONE   Tablet 40 milliGRAM(s) Oral daily  silver sulfADIAZINE 1% Cream 1 Application(s) Topical every 12 hours    MEDICATIONS  (PRN):  acetaminophen   Tablet .. 650 milliGRAM(s) Oral every 6 hours PRN Mild Pain (1 - 3)  ALBUTerol    90 MICROgram(s) HFA Inhaler 1 Puff(s) Inhalation every 6 hours PRN Shortness of Breath and/or Wheezing  ALBUTerol/ipratropium for Nebulization 3 milliLiter(s) Nebulizer every 6 hours PRN Shortness of Breath and/or Wheezing  diphenhydrAMINE 50 milliGRAM(s) Oral every 4 hours PRN Rash and/or Itching and or /allergic symptoms  diphenhydrAMINE   Injectable 50 milliGRAM(s) IntraMuscular every 8 hours PRN Rash and/or Itching  guaiFENesin    Syrup 100 milliGRAM(s) Oral every 6 hours PRN Cough  LORazepam     Tablet 0.5 milliGRAM(s) Oral two times a day PRN Agitation  ondansetron Injectable 4 milliGRAM(s) IV Push every 6 hours PRN Nausea and/or Vomiting

## 2019-05-30 NOTE — PROGRESS NOTE ADULT - ASSESSMENT
73yo female w/ PMH of COPD, cardiomyopathy, htn, recent 3rd admissions for angioedema .       Angioedema  - unclear etiology of symptoms  - continue oral prednisone and benadryl prn  - will need out patient allergy consult    Bilateral lower extremities pain  -     HTN  - c/w lasix, metoprolol    DLD  - c/w lipitor    FLAVIO  - c/w CPAP at night      DVT prophylaxis 73yo female w/ PMH of COPD, cardiomyopathy, anxiety and depression, htn, recent 3rd admissions for angioedema .       Angioedema ?  - unclear etiology of symptoms  - continue oral prednisone and benadryl prn  - will need out patient allergy consult    Bilateral lower extremities pain  - chronic   - gabapentin stopped as possible cause of symptoms     Anxiety / Depression   - continue Duloxetine citalopram, ativan    HTN  - c/w lasix, metoprolol    DLD  - c/w lipitor    FLAVIO  - c/w CPAP at night      DVT prophylaxis

## 2019-05-31 DIAGNOSIS — T78.2XXA ANAPHYLACTIC SHOCK, UNSPECIFIED, INITIAL ENCOUNTER: ICD-10-CM

## 2019-05-31 DIAGNOSIS — I50.31 ACUTE DIASTOLIC (CONGESTIVE) HEART FAILURE: ICD-10-CM

## 2019-05-31 DIAGNOSIS — I10 ESSENTIAL (PRIMARY) HYPERTENSION: ICD-10-CM

## 2019-05-31 DIAGNOSIS — J90 PLEURAL EFFUSION, NOT ELSEWHERE CLASSIFIED: ICD-10-CM

## 2019-05-31 DIAGNOSIS — J96.01 ACUTE RESPIRATORY FAILURE WITH HYPOXIA: ICD-10-CM

## 2019-05-31 DIAGNOSIS — G47.33 OBSTRUCTIVE SLEEP APNEA (ADULT) (PEDIATRIC): ICD-10-CM

## 2019-05-31 DIAGNOSIS — F32.9 MAJOR DEPRESSIVE DISORDER, SINGLE EPISODE, UNSPECIFIED: ICD-10-CM

## 2019-05-31 DIAGNOSIS — N39.0 URINARY TRACT INFECTION, SITE NOT SPECIFIED: ICD-10-CM

## 2019-05-31 DIAGNOSIS — Z90.49 ACQUIRED ABSENCE OF OTHER SPECIFIED PARTS OF DIGESTIVE TRACT: ICD-10-CM

## 2019-05-31 DIAGNOSIS — J44.1 CHRONIC OBSTRUCTIVE PULMONARY DISEASE WITH (ACUTE) EXACERBATION: ICD-10-CM

## 2019-05-31 DIAGNOSIS — Z88.5 ALLERGY STATUS TO NARCOTIC AGENT: ICD-10-CM

## 2019-05-31 DIAGNOSIS — Z86.73 PERSONAL HISTORY OF TRANSIENT ISCHEMIC ATTACK (TIA), AND CEREBRAL INFARCTION WITHOUT RESIDUAL DEFICITS: ICD-10-CM

## 2019-05-31 DIAGNOSIS — T78.49XA OTHER ALLERGY, INITIAL ENCOUNTER: ICD-10-CM

## 2019-05-31 DIAGNOSIS — J81.0 ACUTE PULMONARY EDEMA: ICD-10-CM

## 2019-05-31 DIAGNOSIS — R07.9 CHEST PAIN, UNSPECIFIED: ICD-10-CM

## 2019-05-31 DIAGNOSIS — B96.20 UNSPECIFIED ESCHERICHIA COLI [E. COLI] AS THE CAUSE OF DISEASES CLASSIFIED ELSEWHERE: ICD-10-CM

## 2019-05-31 DIAGNOSIS — Z87.891 PERSONAL HISTORY OF NICOTINE DEPENDENCE: ICD-10-CM

## 2019-05-31 PROCEDURE — 70450 CT HEAD/BRAIN W/O DYE: CPT | Mod: 26

## 2019-05-31 RX ADMIN — Medication 25 MILLIGRAM(S): at 05:26

## 2019-05-31 RX ADMIN — Medication 0.5 MILLIGRAM(S): at 05:38

## 2019-05-31 RX ADMIN — PANTOPRAZOLE SODIUM 40 MILLIGRAM(S): 20 TABLET, DELAYED RELEASE ORAL at 05:25

## 2019-05-31 RX ADMIN — DULOXETINE HYDROCHLORIDE 20 MILLIGRAM(S): 30 CAPSULE, DELAYED RELEASE ORAL at 11:24

## 2019-05-31 RX ADMIN — HEPARIN SODIUM 5000 UNIT(S): 5000 INJECTION INTRAVENOUS; SUBCUTANEOUS at 05:25

## 2019-05-31 RX ADMIN — HEPARIN SODIUM 5000 UNIT(S): 5000 INJECTION INTRAVENOUS; SUBCUTANEOUS at 14:35

## 2019-05-31 RX ADMIN — Medication 20 MILLIGRAM(S): at 05:25

## 2019-05-31 RX ADMIN — Medication 650 MILLIGRAM(S): at 20:41

## 2019-05-31 RX ADMIN — Medication 50 MILLIGRAM(S): at 17:42

## 2019-05-31 RX ADMIN — Medication 3 MILLILITER(S): at 15:59

## 2019-05-31 RX ADMIN — Medication 1 APPLICATION(S): at 17:36

## 2019-05-31 RX ADMIN — HEPARIN SODIUM 5000 UNIT(S): 5000 INJECTION INTRAVENOUS; SUBCUTANEOUS at 21:03

## 2019-05-31 RX ADMIN — ATORVASTATIN CALCIUM 80 MILLIGRAM(S): 80 TABLET, FILM COATED ORAL at 21:02

## 2019-05-31 RX ADMIN — Medication 25 MILLIGRAM(S): at 17:36

## 2019-05-31 RX ADMIN — Medication 50 MILLIGRAM(S): at 05:38

## 2019-05-31 RX ADMIN — Medication 50 MILLIGRAM(S): at 21:14

## 2019-05-31 RX ADMIN — Medication 1 APPLICATION(S): at 05:26

## 2019-05-31 RX ADMIN — CHLORHEXIDINE GLUCONATE 1 APPLICATION(S): 213 SOLUTION TOPICAL at 05:28

## 2019-05-31 RX ADMIN — Medication 50 MILLIGRAM(S): at 11:22

## 2019-05-31 RX ADMIN — Medication 81 MILLIGRAM(S): at 11:24

## 2019-05-31 RX ADMIN — CITALOPRAM 30 MILLIGRAM(S): 10 TABLET, FILM COATED ORAL at 11:24

## 2019-05-31 RX ADMIN — CHLORHEXIDINE GLUCONATE 1 APPLICATION(S): 213 SOLUTION TOPICAL at 17:36

## 2019-05-31 RX ADMIN — Medication 40 MILLIGRAM(S): at 05:28

## 2019-05-31 RX ADMIN — Medication 0.5 MILLIGRAM(S): at 21:07

## 2019-05-31 NOTE — PROGRESS NOTE ADULT - ASSESSMENT
75yo female w/ PMH of COPD, cardiomyopathy, anxiety and depression, htn, recent 3rd admissions for angioedema .     Slurred speech   - will get a ct head    Angioedema ?  - unclear etiology of symptoms  - continue oral prednisone and benadryl prn  - will need out patient allergy consult,  appointment made today  - with Dr Encarnacion for June 18th    Bilateral lower extremities pain  - chronic   - gabapentin stopped as possible cause of symptoms     Anxiety / Depression   - continue Duloxetine citalopram, ativan    HTN  - c/w lasix, metoprolol    DLD  - c/w lipitor    FLAVIO  - c/w CPAP at night      DVT prophylaxis

## 2019-05-31 NOTE — PROGRESS NOTE ADULT - SUBJECTIVE AND OBJECTIVE BOX
Patient reports she feels like she is slurring her speech because of swollen tongue.       T(F): 97.8 (05-31-19 @ 14:43), Max: 97.8 (05-31-19 @ 14:43)  HR: 64 (05-31-19 @ 14:43)  BP: 104/57 (05-31-19 @ 14:43)  RR: 18 (05-31-19 @ 14:43)      PHYSICAL EXAM:  GENERAL: NAD  HEAD:  Atraumatic, Normocephalic  EYES: EOMI, PERRLA, conjunctiva and sclera clear  ENMT: No tonsillar erythema, exudates, or enlargement; Moist mucous membranes  NERVOUS SYSTEM:  Alert & Oriented X3, no focal deficits  CHEST/LUNG: Clear to percussion bilaterally; No rales, rhonchi, wheezing, or rubs  HEART: Regular rate and rhythm; No murmurs, rubs, or gallops  ABDOMEN: Soft, Nontender, Nondistended; Bowel sounds present  EXTREMITIES:  2+ Peripheral Pulses, No clubbing, cyanosis, or edema  LYMPH: No lymphadenopathy noted  SKIN: No rashes or lesions    LABS  05-30    143  |  100  |  44<H>  ----------------------------<  88  4.9   |  32  |  1.0    Ca    9.6      30 May 2019 05:36                            12.5   11.16 )-----------( 249      ( 30 May 2019 05:36 )             40.3     Culture Results:   No growth at 5 days. (05-24-19)    MEDICATIONS  (STANDING):  aspirin  chewable 81 milliGRAM(s) Oral daily  atorvastatin 80 milliGRAM(s) Oral at bedtime  chlorhexidine 4% Liquid 1 Application(s) Topical two times a day  citalopram 30 milliGRAM(s) Oral daily  DULoxetine 20 milliGRAM(s) Oral daily  furosemide    Tablet 20 milliGRAM(s) Oral daily  heparin  Injectable 5000 Unit(s) SubCutaneous every 8 hours  metoprolol tartrate 25 milliGRAM(s) Oral two times a day  pantoprazole    Tablet 40 milliGRAM(s) Oral before breakfast  predniSONE   Tablet 40 milliGRAM(s) Oral daily  silver sulfADIAZINE 1% Cream 1 Application(s) Topical every 12 hours    MEDICATIONS  (PRN):  acetaminophen   Tablet .. 650 milliGRAM(s) Oral every 6 hours PRN Mild Pain (1 - 3)  ALBUTerol    90 MICROgram(s) HFA Inhaler 1 Puff(s) Inhalation every 6 hours PRN Shortness of Breath and/or Wheezing  ALBUTerol/ipratropium for Nebulization 3 milliLiter(s) Nebulizer every 6 hours PRN Shortness of Breath and/or Wheezing  diphenhydrAMINE 50 milliGRAM(s) Oral every 4 hours PRN Rash and/or Itching and or /allergic symptoms  diphenhydrAMINE   Injectable 50 milliGRAM(s) IntraMuscular every 8 hours PRN Rash and/or Itching  guaiFENesin    Syrup 100 milliGRAM(s) Oral every 6 hours PRN Cough  LORazepam     Tablet 0.5 milliGRAM(s) Oral two times a day PRN Agitation  ondansetron Injectable 4 milliGRAM(s) IV Push every 6 hours PRN Nausea and/or Vomiting

## 2019-06-01 PROCEDURE — 99233 SBSQ HOSP IP/OBS HIGH 50: CPT

## 2019-06-01 RX ORDER — ACETAMINOPHEN 500 MG
650 TABLET ORAL EVERY 6 HOURS
Refills: 0 | Status: DISCONTINUED | OUTPATIENT
Start: 2019-06-01 | End: 2019-06-02

## 2019-06-01 RX ADMIN — Medication 0.5 MILLIGRAM(S): at 10:00

## 2019-06-01 RX ADMIN — Medication 650 MILLIGRAM(S): at 19:59

## 2019-06-01 RX ADMIN — HEPARIN SODIUM 5000 UNIT(S): 5000 INJECTION INTRAVENOUS; SUBCUTANEOUS at 05:13

## 2019-06-01 RX ADMIN — Medication 25 MILLIGRAM(S): at 05:12

## 2019-06-01 RX ADMIN — Medication 20 MILLIGRAM(S): at 05:12

## 2019-06-01 RX ADMIN — Medication 650 MILLIGRAM(S): at 20:29

## 2019-06-01 RX ADMIN — Medication 81 MILLIGRAM(S): at 11:03

## 2019-06-01 RX ADMIN — Medication 0.5 MILLIGRAM(S): at 23:14

## 2019-06-01 RX ADMIN — Medication 1 APPLICATION(S): at 05:13

## 2019-06-01 RX ADMIN — Medication 40 MILLIGRAM(S): at 05:15

## 2019-06-01 RX ADMIN — PANTOPRAZOLE SODIUM 40 MILLIGRAM(S): 20 TABLET, DELAYED RELEASE ORAL at 05:12

## 2019-06-01 RX ADMIN — CHLORHEXIDINE GLUCONATE 1 APPLICATION(S): 213 SOLUTION TOPICAL at 05:12

## 2019-06-01 RX ADMIN — CITALOPRAM 30 MILLIGRAM(S): 10 TABLET, FILM COATED ORAL at 11:03

## 2019-06-01 RX ADMIN — Medication 50 MILLIGRAM(S): at 15:13

## 2019-06-01 RX ADMIN — Medication 50 MILLIGRAM(S): at 05:21

## 2019-06-01 RX ADMIN — Medication 50 MILLIGRAM(S): at 20:46

## 2019-06-01 RX ADMIN — DULOXETINE HYDROCHLORIDE 20 MILLIGRAM(S): 30 CAPSULE, DELAYED RELEASE ORAL at 11:03

## 2019-06-01 RX ADMIN — Medication 3 MILLILITER(S): at 08:13

## 2019-06-01 NOTE — PROGRESS NOTE ADULT - ASSESSMENT
73yo female w/ PMH of COPD, cardiomyopathy, anxiety and depression, htn, recent 3rd admissions for angioedema .     Angioedema ?  - unclear etiology of symptoms  - will reduce polypharmacy  - continue oral benadryl prn  prednisone stopped as this may be making patient very anxious and no longer appears to have much utility in improving her current condition  - will need out patient allergy consult,  appointment made today  - with Dr Encarnacion for June 18th  - plan for DC home tomorrow    Bilateral lower extremities pain  - chronic   - gabapentin stopped as possible cause of symptoms     Anxiety / Depression   - stopped Duloxetine citalopram     HTN  - stopped lasix, metoprolol    DLD  -pt requested to stop lipitor    FLAVIO  - c/w CPAP at night        #Progress Note Handoff    Pending (specify):  none    Family discussion: pt aa03/3    Disposition: Home_x__/SNF___/Other________/Unknown at this time________  tomorrow

## 2019-06-01 NOTE — PROGRESS NOTE ADULT - SUBJECTIVE AND OBJECTIVE BOX
Patient reports she continues to feel like her tongue is swollen.  Wants her medication polypharmacy reduced.  She wanted answers as to why this is happening to her and noted her outpatient appointment planned with the allergist.   She wants to leave the hospital and go somewhere else or to [facility] if we do not have any solution for her problems.     Vital Signs Last 24 Hrs  T(C): 35.3 (01 Jun 2019 05:43), Max: 36.6 (31 May 2019 14:43)  T(F): 95.6 (01 Jun 2019 05:43), Max: 97.8 (31 May 2019 14:43)  HR: 62 (01 Jun 2019 05:43) (60 - 64)  BP: 116/60 (01 Jun 2019 05:43) (104/57 - 116/60)  BP(mean): --  RR: 18 (01 Jun 2019 05:43) (18 - 18)  SpO2: --      PHYSICAL EXAM:  GENERAL: NAD  HEAD:  Atraumatic, Normocephalic  EYES: EOMI, PERRLA, conjunctiva and sclera clear  ENMT: No tonsillar erythema, exudates, or enlargement; Moist mucous membranes;  tongue does not appear significantly enlarged today  NERVOUS SYSTEM:  Alert & Oriented X3, no focal deficits, very anxious & argumentative  CHEST/LUNG: Clear to percussion bilaterally; No rales, rhonchi, wheezing, or rubs  HEART: Regular rate and rhythm; No murmurs, rubs, or gallops  ABDOMEN: Soft, Nontender, Nondistended; Bowel sounds present  EXTREMITIES:  2+ Peripheral Pulses, No clubbing, cyanosis, or edema  LYMPH: No lymphadenopathy noted  SKIN: No rashes or lesions        < from: CT Head No Cont (05.31.19 @ 15:46) >    IMPRESSION:     No CT evidence of acute intracranial pathology.      < end of copied text >        MEDICATIONS  (STANDING):    MEDICATIONS  (PRN):  ALBUTerol/ipratropium for Nebulization 3 milliLiter(s) Nebulizer every 6 hours PRN Shortness of Breath and/or Wheezing  diphenhydrAMINE 50 milliGRAM(s) Oral every 4 hours PRN Rash and/or Itching and or /allergic symptoms  LORazepam     Tablet 0.5 milliGRAM(s) Oral two times a day PRN Agitation

## 2019-06-02 ENCOUNTER — TRANSCRIPTION ENCOUNTER (OUTPATIENT)
Age: 74
End: 2019-06-02

## 2019-06-02 VITALS
DIASTOLIC BLOOD PRESSURE: 69 MMHG | HEART RATE: 67 BPM | RESPIRATION RATE: 16 BRPM | SYSTOLIC BLOOD PRESSURE: 132 MMHG | TEMPERATURE: 96 F

## 2019-06-02 PROCEDURE — 99239 HOSP IP/OBS DSCHRG MGMT >30: CPT

## 2019-06-02 RX ORDER — OMEGA-3 ACID ETHYL ESTERS 1 G
0 CAPSULE ORAL
Qty: 0 | Refills: 0 | DISCHARGE

## 2019-06-02 RX ORDER — DIPHENHYDRAMINE HCL 50 MG
1 CAPSULE ORAL
Qty: 0 | Refills: 0 | DISCHARGE
Start: 2019-06-02

## 2019-06-02 RX ORDER — ATORVASTATIN CALCIUM 80 MG/1
1 TABLET, FILM COATED ORAL
Qty: 0 | Refills: 0 | DISCHARGE

## 2019-06-02 RX ORDER — CICLOPIROX OLAMINE 7.7 MG/G
1 CREAM TOPICAL
Qty: 0 | Refills: 0 | DISCHARGE

## 2019-06-02 RX ORDER — CITALOPRAM 10 MG/1
30 TABLET, FILM COATED ORAL
Qty: 0 | Refills: 0 | DISCHARGE

## 2019-06-02 RX ORDER — ACETAMINOPHEN 500 MG
2 TABLET ORAL
Qty: 0 | Refills: 0 | DISCHARGE
Start: 2019-06-02

## 2019-06-02 RX ADMIN — Medication 0.5 MILLIGRAM(S): at 08:11

## 2019-06-02 RX ADMIN — Medication 50 MILLIGRAM(S): at 10:00

## 2019-06-02 RX ADMIN — Medication 3 MILLILITER(S): at 07:45

## 2019-06-02 NOTE — PROGRESS NOTE ADULT - ASSESSMENT
73yo female w/ PMH of COPD, cardiomyopathy, anxiety and depression, htn, recent 3rd admissions for angioedema .     Slurred speech   - ct head negative as above    Angioedema ?  - unclear etiology of symptoms  - all meds except aspirin dc'd as possible etiologies of symptoms  - pt continuing ativan, Tylenol and Benadryl PRN  -  appointment made   - with Dr Encarnacion for June 18th  - Dc home today 36min spent on dc      Anxiety / Depression   - meds dc'd as per pt request  - only  ativan prn    HTN  - meds on hold  - BP is now normal off meds    FLAVIO  - c/w CPAP at night

## 2019-06-02 NOTE — DISCHARGE NOTE PROVIDER - CARE PROVIDER_API CALL
Mary Beth Encarnacion)  Allergy and Immunology; Internal Medicine  [address] [place] [address]  Phone: [phone]  Fax: [phone]  Follow Up Time:     Milad Aguilar)  Family Medicine  [address] [place] [address]  Phone: [phone]  Fax: [phone]  Follow Up Time:

## 2019-06-02 NOTE — DISCHARGE NOTE NURSING/CASE MANAGEMENT/SOCIAL WORK - NSDCDPATPORTLINK_GEN_ALL_CORE
You can access the [facility] Portal, offered by [facility], by registering with the following website: [email or web address]

## 2019-06-02 NOTE — DISCHARGE NOTE PROVIDER - HOSPITAL COURSE
73yo female w/ PMH of COPD, cardiomyopathy, htn, recent 3 admissions for angioedema  presented with another episode of tongue swelling. Pt was admitted to ICU on 5/22/2019 for anaphylaxis for possible causes: polyurethane, gabapentin, verapamil, which all were stopped. Pt developed pulmonary edema (started on Lasix) and UTI (finished 5 days of rocephin) that prolonged her stay. Pt was discharged yesterday at 3pm, went home, and had dinner (macaroni) at home w/ . Pt reports having tongue swelling after eating dinner. Pt took Benadryl w/ moderate relief. Soon after, pt had b/l lower extremities paresthesia, and called EMS. During transport to ED, pt had another tongue swelling. During course of hospitalization pt requested all of her meds to be dc'd as possible etiologies of symptoms. Pt now only on ativan, Tylenol  and benadryl PRN  and 81mg of aspirin daily. Pt has been tolerating oral diet for past three days without any respiratory issues and is cleared for dc home with appointment to see Dr Encarnacion and PMD

## 2019-06-02 NOTE — PROGRESS NOTE ADULT - SUBJECTIVE AND OBJECTIVE BOX
Patient is a 74y old  Female who presents with a chief complaint of Angioedema (02 Jun 2019 09:36)      T(F): 96.5 (06-02-19 @ 06:38), Max: 97.8 (06-01-19 @ 14:12)  HR: 67 (06-02-19 @ 06:38)  BP: 132/69 (06-02-19 @ 06:38)  RR: 16 (06-02-19 @ 06:38)  SpO2: --    PHYSICAL EXAM:  GENERAL: NAD, well-groomed, well-developed  HEAD:  Atraumatic, Normocephalic  EYES: EOMI, PERRLA, conjunctiva and sclera clear  ENMT: No tonsillar erythema, exudates, or enlargement; Moist mucous membranes, Good dentition, No lesions  NECK: Supple, No JVD, Normal thyroid  NERVOUS SYSTEM:  Alert & Oriented X3, Good concentration; Motor Strength 5/5 B/L upper and lower extremities; DTRs 2+ intact and symmetric  CHEST/LUNG: Clear to percussion bilaterally; No rales, rhonchi, wheezing, or rubs  HEART: Regular rate and rhythm; No murmurs, rubs, or gallops  ABDOMEN: Soft, Nontender, Nondistended; Bowel sounds present  EXTREMITIES:  2+ Peripheral Pulses, No clubbing, cyanosis, or edema  LYMPH: No lymphadenopathy noted  SKIN: No rashes or lesion          Culture Results:   No growth at 5 days. (05-24-19)  Culture Results:   >100,000 CFU/ml Escherichia coli (05-24-19)    RADIOLOGY  < from: CT Head No Cont (05.31.19 @ 15:46) >  IMPRESSION:     No CT evidence of acute intracranial pathology.        < end of copied text >      MEDICATIONS  (PRN):  acetaminophen   Tablet .. 650 milliGRAM(s) Oral every 6 hours PRN Temp greater or equal to 38C (100.4F), Moderate Pain (4 - 6)  ALBUTerol/ipratropium for Nebulization 3 milliLiter(s) Nebulizer every 6 hours PRN Shortness of Breath and/or Wheezing  diphenhydrAMINE 50 milliGRAM(s) Oral every 4 hours PRN Rash and/or Itching and or /allergic symptoms  LORazepam     Tablet 0.5 milliGRAM(s) Oral two times a day PRN Agitation

## 2019-06-07 LAB
ALLERGEN - JUNIPER (RED CEDAR) CLASS: 0 — SIGNIFICANT CHANGE UP
ALLERGEN - JUNIPER (RED CEDAR) CONC: <0.1 KUA/L — SIGNIFICANT CHANGE UP
ALLERGEN - ML SYCAMORE, LONDON PLANE: <0.1 KUA/L — SIGNIFICANT CHANGE UP
ALLERGEN - MULBERRY: <0.1 KUA/L — SIGNIFICANT CHANGE UP
BERMUDA GRASS IGE QN: 0 — SIGNIFICANT CHANGE UP
BERMUDA GRASS IGE QN: <0.1 KUA/L — SIGNIFICANT CHANGE UP
BOXELDER/MAPLE CLASS: 0 — SIGNIFICANT CHANGE UP
CALIF WALNUT IGE QN: <0.1 KUA/L — SIGNIFICANT CHANGE UP
CAT DANDER IGE QN: <0.1 KUA/L — SIGNIFICANT CHANGE UP
CLADOSPORIUM IGE QN: 0 — SIGNIFICANT CHANGE UP
CLADOSPORIUM IGE QN: <0.1 KUA/L — SIGNIFICANT CHANGE UP
CLAM IGE QN: <0.1 KUA/L — SIGNIFICANT CHANGE UP
CMN PIGWEED IGE QN: <0.1 KUA/L — SIGNIFICANT CHANGE UP
CODFISH IGE QN: <0.1 KUA/L — SIGNIFICANT CHANGE UP
COMMON RAGWEED IGE QN: <0.1 KUA/L — SIGNIFICANT CHANGE UP
CORN IGE QN: <0.1 KUA/L — SIGNIFICANT CHANGE UP
COTTONWOOD IGE QN: <0.1 KUA/L — SIGNIFICANT CHANGE UP
D FARINAE IGE QN: <0.1 KUA/L — SIGNIFICANT CHANGE UP
D PTERONYSS IGE QN: <0.1 KUA/L — SIGNIFICANT CHANGE UP
DEPRECATED A ALTERNATA IGE RAST QL: <0.1 KUA/L — SIGNIFICANT CHANGE UP
DEPRECATED A FUMIGATUS IGE RAST QL: 0 — SIGNIFICANT CHANGE UP
DEPRECATED ALTERNARIA IGE RAST QL: 0 — SIGNIFICANT CHANGE UP
DEPRECATED CALIF WALNUT POLN IGE RAST QL: 0 — SIGNIFICANT CHANGE UP
DEPRECATED CAT DANDER IGE RAST QL: 0 — SIGNIFICANT CHANGE UP
DEPRECATED CLAM IGE RAST QL: 0 — SIGNIFICANT CHANGE UP
DEPRECATED CODFISH IGE RAST QL: 0 — SIGNIFICANT CHANGE UP
DEPRECATED COMMON PIGWEED IGE RAST QL: 0 — SIGNIFICANT CHANGE UP
DEPRECATED COMMON RAGWEED IGE RAST QL: 0 — SIGNIFICANT CHANGE UP
DEPRECATED CORN IGE RAST QL: 0 — SIGNIFICANT CHANGE UP
DEPRECATED COTTONWOOD IGE RAST QL: 0 — SIGNIFICANT CHANGE UP
DEPRECATED D FARINAE IGE RAST QL: 0 — SIGNIFICANT CHANGE UP
DEPRECATED EGG WHITE IGE RAST QL: 0 — SIGNIFICANT CHANGE UP
DEPRECATED LONDON PLANE IGE RAST QL: 0 — SIGNIFICANT CHANGE UP
DEPRECATED MILK IGE RAST QL: 0 — SIGNIFICANT CHANGE UP
DEPRECATED MUGWORT IGE RAST QL: 0 — SIGNIFICANT CHANGE UP
DEPRECATED P NOTATUM IGE RAST QL: 0 — SIGNIFICANT CHANGE UP
DEPRECATED PEANUT IGE RAST QL: 0 — SIGNIFICANT CHANGE UP
DEPRECATED ROACH IGE RAST QL: 0 — SIGNIFICANT CHANGE UP
DEPRECATED SCALLOP IGE RAST QL: 0 — SIGNIFICANT CHANGE UP
DEPRECATED SESAME SEED IGE RAST QL: 0 — SIGNIFICANT CHANGE UP
DEPRECATED SHEEP SORREL IGE RAST QL: 0 — SIGNIFICANT CHANGE UP
DEPRECATED SHRIMP IGE RAST QL: 0 — SIGNIFICANT CHANGE UP
DEPRECATED SOYBEAN IGE RAST QL: 0 — SIGNIFICANT CHANGE UP
DEPRECATED TIMOTHY IGE RAST QL: 0 — SIGNIFICANT CHANGE UP
DEPRECATED WALNUT IGE RAST QL: 0 — SIGNIFICANT CHANGE UP
DEPRECATED WHEAT IGE RAST QL: 0 — SIGNIFICANT CHANGE UP
DEPRECATED WHITE ASH IGE RAST QL: 0 — SIGNIFICANT CHANGE UP
DOG DANDER IGE QN: 0 — SIGNIFICANT CHANGE UP
DOG DANDER IGE QN: <0.1 KUA/L — SIGNIFICANT CHANGE UP
DUST ALLERG MIX2 IGE RAST: 0 — SIGNIFICANT CHANGE UP
EGG WHITE IGE QN: <0.1 KUA/L — SIGNIFICANT CHANGE UP
GOOSEFOOT IGE QN: <0.1 KUA/L — SIGNIFICANT CHANGE UP
MILK IGE QN: <0.1 KUA/L — SIGNIFICANT CHANGE UP
MOLD ALLERG MIX A IGE QL: <0.1 KUA/L — SIGNIFICANT CHANGE UP
MUGWORT IGE QN: <0.1 KUA/L — SIGNIFICANT CHANGE UP
MULBERRY CLASS: 0 — SIGNIFICANT CHANGE UP
P NOTATUM IGE QN: <0.1 KUA/L — SIGNIFICANT CHANGE UP
PEANUT IGE QN: <0.1 KUA/L — SIGNIFICANT CHANGE UP
ROACH IGE QN: <0.1 KUA/L — SIGNIFICANT CHANGE UP
SCALLOP IGE QN: <0.1 KUA/L — SIGNIFICANT CHANGE UP
SESAME SEED IGE QN: <0.1 KUA/L — SIGNIFICANT CHANGE UP
SHEEP SORREL IGE QN: <0.1 KUA/L — SIGNIFICANT CHANGE UP
SHRIMP IGE QN: <0.1 KUA/L — SIGNIFICANT CHANGE UP
SILVER BIRCH IGE QN: 0 — SIGNIFICANT CHANGE UP
SILVER BIRCH IGE QN: <0.1 KUA/L — SIGNIFICANT CHANGE UP
SOYBEAN IGE QN: <0.1 KUA/L — SIGNIFICANT CHANGE UP
TIMOTHY IGE QN: <0.1 KUA/L — SIGNIFICANT CHANGE UP
TOTAL IGE SMQN RAST: 32 KU/L — SIGNIFICANT CHANGE UP
TOTAL IGE SMQN RAST: SIGNIFICANT CHANGE UP
TREE ALLERG MIX1 IGE QL: <0.1 KUA/L — SIGNIFICANT CHANGE UP
TREE ALLERG MIX1 IGE QN: <0.1 KUA/L — SIGNIFICANT CHANGE UP
TREE ALLERG MIX1 IGE RAST: 0 — SIGNIFICANT CHANGE UP
TREE ALLERG MIX1 IGE RAST: 0 — SIGNIFICANT CHANGE UP
TREE ALLERG MIX8 IGE QL: <0.1 KUA/L — SIGNIFICANT CHANGE UP
WALNUT IGE QN: <0.1 KUA/L — SIGNIFICANT CHANGE UP
WEED ALLERG MIX1 IGE RAST: 0 — SIGNIFICANT CHANGE UP
WHEAT IGE QN: <0.1 KUA/L — SIGNIFICANT CHANGE UP
WHITE ASH IGE QN: <0.1 KUA/L — SIGNIFICANT CHANGE UP

## 2019-06-08 DIAGNOSIS — J44.9 CHRONIC OBSTRUCTIVE PULMONARY DISEASE, UNSPECIFIED: ICD-10-CM

## 2019-06-08 DIAGNOSIS — F32.9 MAJOR DEPRESSIVE DISORDER, SINGLE EPISODE, UNSPECIFIED: ICD-10-CM

## 2019-06-08 DIAGNOSIS — E78.5 HYPERLIPIDEMIA, UNSPECIFIED: ICD-10-CM

## 2019-06-08 DIAGNOSIS — R47.81 SLURRED SPEECH: ICD-10-CM

## 2019-06-08 DIAGNOSIS — I10 ESSENTIAL (PRIMARY) HYPERTENSION: ICD-10-CM

## 2019-06-08 DIAGNOSIS — Z99.89 DEPENDENCE ON OTHER ENABLING MACHINES AND DEVICES: ICD-10-CM

## 2019-06-08 DIAGNOSIS — G47.33 OBSTRUCTIVE SLEEP APNEA (ADULT) (PEDIATRIC): ICD-10-CM

## 2019-06-08 DIAGNOSIS — G45.9 TRANSIENT CEREBRAL ISCHEMIC ATTACK, UNSPECIFIED: ICD-10-CM

## 2019-06-08 DIAGNOSIS — M79.662 PAIN IN LEFT LOWER LEG: ICD-10-CM

## 2019-06-08 DIAGNOSIS — T78.3XXA ANGIONEUROTIC EDEMA, INITIAL ENCOUNTER: ICD-10-CM
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
## 2019-06-08 DIAGNOSIS — F41.9 ANXIETY DISORDER, UNSPECIFIED: ICD-10-CM

## 2019-06-08 DIAGNOSIS — I42.9 CARDIOMYOPATHY, UNSPECIFIED: ICD-10-CM

## 2019-06-08 DIAGNOSIS — I73.9 PERIPHERAL VASCULAR DISEASE, UNSPECIFIED: ICD-10-CM

## 2019-06-08 DIAGNOSIS — G89.29 OTHER CHRONIC PAIN: ICD-10-CM

## 2019-06-08 DIAGNOSIS — M79.661 PAIN IN RIGHT LOWER LEG: ICD-10-CM

## 2019-06-08 DIAGNOSIS — Z88.8 ALLERGY STATUS TO OTHER DRUGS, MEDICAMENTS AND BIOLOGICAL SUBSTANCES: ICD-10-CM
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
## 2019-07-06 ENCOUNTER — EMERGENCY (EMERGENCY)
Facility: HOSPITAL | Age: 74
LOS: 0 days | Discharge: HOME | End: 2019-07-06
Attending: EMERGENCY MEDICINE | Admitting: EMERGENCY MEDICINE
Payer: MEDICARE

## 2019-07-06 VITALS
OXYGEN SATURATION: 93 % | TEMPERATURE: 97 F | RESPIRATION RATE: 20 BRPM | HEART RATE: 77 BPM | DIASTOLIC BLOOD PRESSURE: 67 MMHG | SYSTOLIC BLOOD PRESSURE: 154 MMHG

## 2019-07-06 VITALS
DIASTOLIC BLOOD PRESSURE: 79 MMHG | HEART RATE: 79 BPM | SYSTOLIC BLOOD PRESSURE: 170 MMHG | TEMPERATURE: 98 F | OXYGEN SATURATION: 98 %

## 2019-07-06 DIAGNOSIS — Z79.899 OTHER LONG TERM (CURRENT) DRUG THERAPY: ICD-10-CM

## 2019-07-06 DIAGNOSIS — Z86.73 PERSONAL HISTORY OF TRANSIENT ISCHEMIC ATTACK (TIA), AND CEREBRAL INFARCTION WITHOUT RESIDUAL DEFICITS: ICD-10-CM

## 2019-07-06 DIAGNOSIS — R07.89 OTHER CHEST PAIN: ICD-10-CM

## 2019-07-06 DIAGNOSIS — Z79.82 LONG TERM (CURRENT) USE OF ASPIRIN: ICD-10-CM

## 2019-07-06 DIAGNOSIS — Z90.49 ACQUIRED ABSENCE OF OTHER SPECIFIED PARTS OF DIGESTIVE TRACT: Chronic | ICD-10-CM

## 2019-07-06 DIAGNOSIS — Z88.5 ALLERGY STATUS TO NARCOTIC AGENT: ICD-10-CM

## 2019-07-06 DIAGNOSIS — F31.9 BIPOLAR DISORDER, UNSPECIFIED: ICD-10-CM

## 2019-07-06 DIAGNOSIS — Z88.8 ALLERGY STATUS TO OTHER DRUGS, MEDICAMENTS AND BIOLOGICAL SUBSTANCES: ICD-10-CM

## 2019-07-06 LAB
ALBUMIN SERPL ELPH-MCNC: 3.8 G/DL — SIGNIFICANT CHANGE UP (ref 3.5–5.2)
ALP SERPL-CCNC: 111 U/L — SIGNIFICANT CHANGE UP (ref 30–115)
ALT FLD-CCNC: 14 U/L — SIGNIFICANT CHANGE UP (ref 0–41)
ANION GAP SERPL CALC-SCNC: 11 MMOL/L — SIGNIFICANT CHANGE UP (ref 7–14)
AST SERPL-CCNC: 21 U/L — SIGNIFICANT CHANGE UP (ref 0–41)
BILIRUB SERPL-MCNC: 0.2 MG/DL — SIGNIFICANT CHANGE UP (ref 0.2–1.2)
BUN SERPL-MCNC: 21 MG/DL — HIGH (ref 10–20)
CALCIUM SERPL-MCNC: 9.1 MG/DL — SIGNIFICANT CHANGE UP (ref 8.5–10.1)
CHLORIDE SERPL-SCNC: 105 MMOL/L — SIGNIFICANT CHANGE UP (ref 98–110)
CO2 SERPL-SCNC: 23 MMOL/L — SIGNIFICANT CHANGE UP (ref 17–32)
CREAT SERPL-MCNC: 0.9 MG/DL — SIGNIFICANT CHANGE UP (ref 0.7–1.5)
GLUCOSE SERPL-MCNC: 112 MG/DL — HIGH (ref 70–99)
HCT VFR BLD CALC: 36.5 % — LOW (ref 37–47)
HGB BLD-MCNC: 11.7 G/DL — LOW (ref 12–16)
MAGNESIUM SERPL-MCNC: 2 MG/DL — SIGNIFICANT CHANGE UP (ref 1.8–2.4)
MCHC RBC-ENTMCNC: 28.5 PG — SIGNIFICANT CHANGE UP (ref 27–31)
MCHC RBC-ENTMCNC: 32.1 G/DL — SIGNIFICANT CHANGE UP (ref 32–37)
MCV RBC AUTO: 89 FL — SIGNIFICANT CHANGE UP (ref 81–99)
NRBC # BLD: 0 /100 WBCS — SIGNIFICANT CHANGE UP (ref 0–0)
NT-PROBNP SERPL-SCNC: 1828 PG/ML — HIGH (ref 0–300)
PLATELET # BLD AUTO: 251 K/UL — SIGNIFICANT CHANGE UP (ref 130–400)
POTASSIUM SERPL-MCNC: 3.6 MMOL/L — SIGNIFICANT CHANGE UP (ref 3.5–5)
POTASSIUM SERPL-SCNC: 3.6 MMOL/L — SIGNIFICANT CHANGE UP (ref 3.5–5)
PROT SERPL-MCNC: 6.3 G/DL — SIGNIFICANT CHANGE UP (ref 6–8)
RBC # BLD: 4.1 M/UL — LOW (ref 4.2–5.4)
RBC # FLD: 15.4 % — HIGH (ref 11.5–14.5)
SODIUM SERPL-SCNC: 139 MMOL/L — SIGNIFICANT CHANGE UP (ref 135–146)
TROPONIN T SERPL-MCNC: <0.01 NG/ML — SIGNIFICANT CHANGE UP
VALPROATE SERPL-MCNC: <3 UG/ML — LOW (ref 50–100)
WBC # BLD: 5.93 K/UL — SIGNIFICANT CHANGE UP (ref 4.8–10.8)
WBC # FLD AUTO: 5.93 K/UL — SIGNIFICANT CHANGE UP (ref 4.8–10.8)

## 2019-07-06 PROCEDURE — 99285 EMERGENCY DEPT VISIT HI MDM: CPT

## 2019-07-06 PROCEDURE — 71046 X-RAY EXAM CHEST 2 VIEWS: CPT | Mod: 26

## 2019-07-06 NOTE — ED PROVIDER NOTE - CARE PROVIDER_API CALL
Carl Bishop)  Cardiovascular Disease; Internal Medicine  [address] [place] [id number]  Phone: [phone]  Fax: [phone]  Follow Up Time: 1-3 Days

## 2019-07-06 NOTE — ED PROVIDER NOTE - OBJECTIVE STATEMENT
chest pressure with heaviness that is midstrenal that happened at rest without radiation and no associated symptoms with it. recenlty admitted for angioedema with plan for stress test but not done yet 2/2 to angioedema incidences. chest pressure that is midstrenal that happened at rest without radiation and no associated symptoms with it. now asymptomatic recenlty admitted for angioedema with plan for stress test but not done yet 2/2 to angioedema incidences.

## 2019-07-06 NOTE — ED PROVIDER NOTE - CLINICAL SUMMARY MEDICAL DECISION MAKING FREE TEXT BOX
chest pain, ekg and trop negative, patient would like to go home, fu with dr. jaime on tues for outpatient work up.

## 2019-07-06 NOTE — ED PROVIDER NOTE - PROGRESS NOTE DETAILS
results discussed with patient, she would like to go home and fu with dr. jaime, she has been evaluated several times for sob and chest pain by cardiology as intpatient with work up being negative,

## 2019-07-06 NOTE — ED PROVIDER NOTE - NS ED ROS FT
Constitutional:  no fevers, no chills, no malaise  Eyes:  No visual changes  ENMT: No neck pain or stiffness, no nasal congestion, no ear pain, no throat pain  Cardiac:  +CP  Respiratory:  No cough or sob  GI:  No nausea, vomiting, diarrhea or abdominal pain.  :  No dysuria, frequency or burning.  MS:  No back pain, no joint pain.  Neuro:  No headache, no dizziness, no change in mental status  Skin:  No skin rash  Except as documented in the HPI,  all other systems are negative

## 2019-07-06 NOTE — ED ADULT NURSE NOTE - PMH
Allergic reaction    Bipolar 1 disorder    COPD (chronic obstructive pulmonary disease)    Depression    Hypertension    Other cardiomyopathy    Other emphysema    PVD (peripheral vascular disease)    TIA (transient ischemic attack)

## 2019-07-06 NOTE — ED ADULT TRIAGE NOTE - CHIEF COMPLAINT QUOTE
"my blood pressure was 214", chest pressure, SOB  as per , she is refusing medication for her bipolar

## 2019-08-13 ENCOUNTER — INPATIENT (INPATIENT)
Facility: HOSPITAL | Age: 74
LOS: 3 days | Discharge: HOME HEALTH | End: 2019-08-17
Attending: INTERNAL MEDICINE | Admitting: INTERNAL MEDICINE
Payer: MEDICARE

## 2019-08-13 VITALS
OXYGEN SATURATION: 90 % | TEMPERATURE: 97 F | HEART RATE: 72 BPM | SYSTOLIC BLOOD PRESSURE: 183 MMHG | DIASTOLIC BLOOD PRESSURE: 87 MMHG | WEIGHT: 190.04 LBS | RESPIRATION RATE: 30 BRPM

## 2019-08-13 DIAGNOSIS — I50.23 ACUTE ON CHRONIC SYSTOLIC (CONGESTIVE) HEART FAILURE: ICD-10-CM

## 2019-08-13 DIAGNOSIS — Z90.49 ACQUIRED ABSENCE OF OTHER SPECIFIED PARTS OF DIGESTIVE TRACT: Chronic | ICD-10-CM

## 2019-08-13 LAB
ALBUMIN SERPL ELPH-MCNC: 4.2 G/DL — SIGNIFICANT CHANGE UP (ref 3.5–5.2)
ALP SERPL-CCNC: 157 U/L — HIGH (ref 30–115)
ALT FLD-CCNC: 67 U/L — HIGH (ref 0–41)
ANION GAP SERPL CALC-SCNC: 13 MMOL/L — SIGNIFICANT CHANGE UP (ref 7–14)
APTT BLD: 33.4 SEC — SIGNIFICANT CHANGE UP (ref 27–39.2)
AST SERPL-CCNC: 95 U/L — HIGH (ref 0–41)
BASE EXCESS BLDV CALC-SCNC: -0.1 MMOL/L — SIGNIFICANT CHANGE UP (ref -2–2)
BASOPHILS # BLD AUTO: 0.04 K/UL — SIGNIFICANT CHANGE UP (ref 0–0.2)
BASOPHILS NFR BLD AUTO: 0.5 % — SIGNIFICANT CHANGE UP (ref 0–1)
BILIRUB SERPL-MCNC: 0.4 MG/DL — SIGNIFICANT CHANGE UP (ref 0.2–1.2)
BUN SERPL-MCNC: 26 MG/DL — HIGH (ref 10–20)
CA-I SERPL-SCNC: 1.24 MMOL/L — SIGNIFICANT CHANGE UP (ref 1.12–1.3)
CALCIUM SERPL-MCNC: 9.3 MG/DL — SIGNIFICANT CHANGE UP (ref 8.5–10.1)
CHLORIDE SERPL-SCNC: 105 MMOL/L — SIGNIFICANT CHANGE UP (ref 98–110)
CO2 SERPL-SCNC: 23 MMOL/L — SIGNIFICANT CHANGE UP (ref 17–32)
CREAT SERPL-MCNC: 1 MG/DL — SIGNIFICANT CHANGE UP (ref 0.7–1.5)
EOSINOPHIL # BLD AUTO: 0.29 K/UL — SIGNIFICANT CHANGE UP (ref 0–0.7)
EOSINOPHIL NFR BLD AUTO: 3.5 % — SIGNIFICANT CHANGE UP (ref 0–8)
GAS PNL BLDV: 143 MMOL/L — SIGNIFICANT CHANGE UP (ref 136–145)
GAS PNL BLDV: SIGNIFICANT CHANGE UP
GLUCOSE SERPL-MCNC: 89 MG/DL — SIGNIFICANT CHANGE UP (ref 70–99)
HCO3 BLDV-SCNC: 26 MMOL/L — SIGNIFICANT CHANGE UP (ref 22–29)
HCT VFR BLD CALC: 38.6 % — SIGNIFICANT CHANGE UP (ref 37–47)
HCT VFR BLDA CALC: 39.1 % — SIGNIFICANT CHANGE UP (ref 34–44)
HGB BLD CALC-MCNC: 12.8 G/DL — LOW (ref 14–18)
HGB BLD-MCNC: 12.3 G/DL — SIGNIFICANT CHANGE UP (ref 12–16)
HOROWITZ INDEX BLDV+IHG-RTO: 21 — SIGNIFICANT CHANGE UP
IMM GRANULOCYTES NFR BLD AUTO: 0.2 % — SIGNIFICANT CHANGE UP (ref 0.1–0.3)
INR BLD: 1.11 RATIO — SIGNIFICANT CHANGE UP (ref 0.65–1.3)
LACTATE BLDV-MCNC: 0.7 MMOL/L — SIGNIFICANT CHANGE UP (ref 0.5–1.6)
LIDOCAIN IGE QN: 60 U/L — SIGNIFICANT CHANGE UP (ref 7–60)
LYMPHOCYTES # BLD AUTO: 1.41 K/UL — SIGNIFICANT CHANGE UP (ref 1.2–3.4)
LYMPHOCYTES # BLD AUTO: 17.2 % — LOW (ref 20.5–51.1)
MAGNESIUM SERPL-MCNC: 2 MG/DL — SIGNIFICANT CHANGE UP (ref 1.8–2.4)
MCHC RBC-ENTMCNC: 29.6 PG — SIGNIFICANT CHANGE UP (ref 27–31)
MCHC RBC-ENTMCNC: 31.9 G/DL — LOW (ref 32–37)
MCV RBC AUTO: 92.8 FL — SIGNIFICANT CHANGE UP (ref 81–99)
MONOCYTES # BLD AUTO: 0.41 K/UL — SIGNIFICANT CHANGE UP (ref 0.1–0.6)
MONOCYTES NFR BLD AUTO: 5 % — SIGNIFICANT CHANGE UP (ref 1.7–9.3)
NEUTROPHILS # BLD AUTO: 6.05 K/UL — SIGNIFICANT CHANGE UP (ref 1.4–6.5)
NEUTROPHILS NFR BLD AUTO: 73.6 % — SIGNIFICANT CHANGE UP (ref 42.2–75.2)
NRBC # BLD: 0 /100 WBCS — SIGNIFICANT CHANGE UP (ref 0–0)
NT-PROBNP SERPL-SCNC: 3835 PG/ML — HIGH (ref 0–300)
PCO2 BLDV: 50 MMHG — SIGNIFICANT CHANGE UP (ref 41–51)
PH BLDV: 7.33 — SIGNIFICANT CHANGE UP (ref 7.26–7.43)
PHOSPHATE SERPL-MCNC: 4.7 MG/DL — SIGNIFICANT CHANGE UP (ref 2.1–4.9)
PLATELET # BLD AUTO: 236 K/UL — SIGNIFICANT CHANGE UP (ref 130–400)
PO2 BLDV: 25 MMHG — SIGNIFICANT CHANGE UP (ref 20–40)
POTASSIUM BLDV-SCNC: 4 MMOL/L — SIGNIFICANT CHANGE UP (ref 3.3–5.6)
POTASSIUM SERPL-MCNC: 5 MMOL/L — SIGNIFICANT CHANGE UP (ref 3.5–5)
POTASSIUM SERPL-SCNC: 5 MMOL/L — SIGNIFICANT CHANGE UP (ref 3.5–5)
PROT SERPL-MCNC: 6.9 G/DL — SIGNIFICANT CHANGE UP (ref 6–8)
PROTHROM AB SERPL-ACNC: 12.8 SEC — SIGNIFICANT CHANGE UP (ref 9.95–12.87)
RBC # BLD: 4.16 M/UL — LOW (ref 4.2–5.4)
RBC # FLD: 18.4 % — HIGH (ref 11.5–14.5)
SAO2 % BLDV: 36 % — SIGNIFICANT CHANGE UP
SODIUM SERPL-SCNC: 141 MMOL/L — SIGNIFICANT CHANGE UP (ref 135–146)
TROPONIN T SERPL-MCNC: <0.01 NG/ML — SIGNIFICANT CHANGE UP
WBC # BLD: 8.22 K/UL — SIGNIFICANT CHANGE UP (ref 4.8–10.8)
WBC # FLD AUTO: 8.22 K/UL — SIGNIFICANT CHANGE UP (ref 4.8–10.8)

## 2019-08-13 PROCEDURE — 99285 EMERGENCY DEPT VISIT HI MDM: CPT

## 2019-08-13 PROCEDURE — 76705 ECHO EXAM OF ABDOMEN: CPT | Mod: 26

## 2019-08-13 PROCEDURE — 74177 CT ABD & PELVIS W/CONTRAST: CPT | Mod: 26

## 2019-08-13 PROCEDURE — 71046 X-RAY EXAM CHEST 2 VIEWS: CPT | Mod: 26

## 2019-08-13 PROCEDURE — 71275 CT ANGIOGRAPHY CHEST: CPT | Mod: 26

## 2019-08-13 PROCEDURE — 99222 1ST HOSP IP/OBS MODERATE 55: CPT | Mod: AI

## 2019-08-13 RX ORDER — GABAPENTIN 400 MG/1
100 CAPSULE ORAL THREE TIMES A DAY
Refills: 0 | Status: DISCONTINUED | OUTPATIENT
Start: 2019-08-13 | End: 2019-08-16

## 2019-08-13 RX ORDER — METOPROLOL TARTRATE 50 MG
100 TABLET ORAL
Refills: 0 | Status: DISCONTINUED | OUTPATIENT
Start: 2019-08-13 | End: 2019-08-17

## 2019-08-13 RX ORDER — BUDESONIDE AND FORMOTEROL FUMARATE DIHYDRATE 160; 4.5 UG/1; UG/1
2 AEROSOL RESPIRATORY (INHALATION)
Refills: 0 | Status: DISCONTINUED | OUTPATIENT
Start: 2019-08-13 | End: 2019-08-17

## 2019-08-13 RX ORDER — FUROSEMIDE 40 MG
20 TABLET ORAL
Refills: 0 | Status: DISCONTINUED | OUTPATIENT
Start: 2019-08-13 | End: 2019-08-14

## 2019-08-13 RX ORDER — HEPARIN SODIUM 5000 [USP'U]/ML
5000 INJECTION INTRAVENOUS; SUBCUTANEOUS EVERY 8 HOURS
Refills: 0 | Status: DISCONTINUED | OUTPATIENT
Start: 2019-08-13 | End: 2019-08-17

## 2019-08-13 RX ORDER — ACETAMINOPHEN 500 MG
975 TABLET ORAL ONCE
Refills: 0 | Status: COMPLETED | OUTPATIENT
Start: 2019-08-13 | End: 2019-08-13

## 2019-08-13 RX ORDER — FAMOTIDINE 10 MG/ML
40 INJECTION INTRAVENOUS AT BEDTIME
Refills: 0 | Status: DISCONTINUED | OUTPATIENT
Start: 2019-08-13 | End: 2019-08-17

## 2019-08-13 RX ORDER — ASPIRIN/CALCIUM CARB/MAGNESIUM 324 MG
81 TABLET ORAL DAILY
Refills: 0 | Status: DISCONTINUED | OUTPATIENT
Start: 2019-08-13 | End: 2019-08-17

## 2019-08-13 RX ORDER — AMLODIPINE BESYLATE 2.5 MG/1
1 TABLET ORAL
Qty: 0 | Refills: 0 | DISCHARGE

## 2019-08-13 RX ORDER — ESCITALOPRAM OXALATE 10 MG/1
10 TABLET, FILM COATED ORAL DAILY
Refills: 0 | Status: DISCONTINUED | OUTPATIENT
Start: 2019-08-13 | End: 2019-08-17

## 2019-08-13 RX ORDER — ATORVASTATIN CALCIUM 80 MG/1
20 TABLET, FILM COATED ORAL AT BEDTIME
Refills: 0 | Status: DISCONTINUED | OUTPATIENT
Start: 2019-08-13 | End: 2019-08-17

## 2019-08-13 RX ORDER — AMLODIPINE BESYLATE 2.5 MG/1
2.5 TABLET ORAL DAILY
Refills: 0 | Status: DISCONTINUED | OUTPATIENT
Start: 2019-08-13 | End: 2019-08-17

## 2019-08-13 RX ORDER — ASPIRIN/CALCIUM CARB/MAGNESIUM 324 MG
325 TABLET ORAL ONCE
Refills: 0 | Status: COMPLETED | OUTPATIENT
Start: 2019-08-13 | End: 2019-08-13

## 2019-08-13 RX ORDER — ACETAMINOPHEN 500 MG
650 TABLET ORAL EVERY 6 HOURS
Refills: 0 | Status: DISCONTINUED | OUTPATIENT
Start: 2019-08-13 | End: 2019-08-15

## 2019-08-13 RX ORDER — FUROSEMIDE 40 MG
40 TABLET ORAL ONCE
Refills: 0 | Status: COMPLETED | OUTPATIENT
Start: 2019-08-13 | End: 2019-08-13

## 2019-08-13 RX ORDER — FLUTICASONE PROPIONATE 50 MCG
1 SPRAY, SUSPENSION NASAL
Refills: 0 | Status: DISCONTINUED | OUTPATIENT
Start: 2019-08-13 | End: 2019-08-17

## 2019-08-13 RX ORDER — FAMOTIDINE 10 MG/ML
20 INJECTION INTRAVENOUS ONCE
Refills: 0 | Status: COMPLETED | OUTPATIENT
Start: 2019-08-13 | End: 2019-08-13

## 2019-08-13 RX ORDER — IPRATROPIUM/ALBUTEROL SULFATE 18-103MCG
3 AEROSOL WITH ADAPTER (GRAM) INHALATION ONCE
Refills: 0 | Status: COMPLETED | OUTPATIENT
Start: 2019-08-13 | End: 2019-08-13

## 2019-08-13 RX ORDER — MONTELUKAST 4 MG/1
10 TABLET, CHEWABLE ORAL DAILY
Refills: 0 | Status: DISCONTINUED | OUTPATIENT
Start: 2019-08-13 | End: 2019-08-17

## 2019-08-13 RX ADMIN — FAMOTIDINE 20 MILLIGRAM(S): 10 INJECTION INTRAVENOUS at 17:53

## 2019-08-13 RX ADMIN — Medication 975 MILLIGRAM(S): at 19:57

## 2019-08-13 RX ADMIN — Medication 40 MILLIGRAM(S): at 21:39

## 2019-08-13 RX ADMIN — Medication 325 MILLIGRAM(S): at 21:40

## 2019-08-13 RX ADMIN — Medication 3 MILLILITER(S): at 18:09

## 2019-08-13 NOTE — ED PROVIDER NOTE - ATTENDING CONTRIBUTION TO CARE
74f w a hx of HTN, HLD, Bipolar, hypothyroid, & COPD. Pt presents w 1 day of LUQ pain and dyspnea. Pain is sharp, moderate, constant, no radiating, no exacerbating/alleviating. No changes w eating/exertion. No vomit/diarrhea, no black/bloody stools. Pt seen in clinic and was sent to ED after O2 79%. No recent travel/hosp/immobilization.    Review of Systems  Constitutional:  No fever or chills.   Eyes:  Negative.   ENMT:  No nasal congestion, discharge, or throat pain.   Cardiac:  No chest pain, palpitations, syncope, or edema.  Respiratory:  No wheezing, hemoptysis, or cough.  GI:  See HPI  :  No dysuria or hematuria.   Musculoskeletal:  No gait abnormality, joint pain, or back pain.  Skin:  No skin rash, jaundice, or lesions.  Neuro:  No headache, loss of sensation, or focal weakness.  No change in mental status.     Physical Exam  General: Awake, alert, NAD, WDWN, non-toxic appearing, NCAT  Eyes: PERRL, EOMI, no icterus, lids and conjunctivae are normal  ENT: External inspection normal, pink/moist membranes, pharynx normal  CV: S1S2, regular rate and rhythm, no murmur/gallops/rubs, no JVD, 2+ pulses b/l, no edema/cords/homans, warm/well-perfused  Respiratory: Normal respiratory rate/effort, no respiratory distress, normal voice, speaking full sentences, lungs rales to auscultation b/l bases, no retractions, no stridor  Abdomen: Soft abdomen, LUQ tender, no distended/guarding/rebound, no CVA tender  Musculoskeletal: FROM all 4 extremities, N/V intact  Neck: FROM neck, supple, no meningismus, trachea midline, no JVD  Integumentary: Color normal for race, warm and dry, no rash  Neuro: Oriented x3, CN 2-12 grossly intact, normal motor, normal sensory  Psych: Oriented x3, mood normal, affect normal     74f w dyspnea and hypoxia as outpatient w LUQ pain/tender. Hypoxia resolved in ED. nontoxic appearing, n/v intact. No resp distress. --Labs, EKG, CXR, CT's, Analgesia/antiemetics as needed, observe/re-assess.

## 2019-08-13 NOTE — ED PROVIDER NOTE - OBJECTIVE STATEMENT
74F h/o COPD on home O2, HTN, CHF p/w SOB, LUQ pain. LUQ started yesterday, SOB progressed since then. Denies fever, chills, CP, n/v/d, lightheadedness, syncope. denies recent travel/surgery.

## 2019-08-13 NOTE — ED ADULT NURSE NOTE - CHIEF COMPLAINT QUOTE
BIBA for SOB. Pt went to [facility] for severe abdomen pain . pT then  Became labored. pt O2 sat 79%RA. PT was placed on a NRB by EMT . Pt uses 02 at home

## 2019-08-13 NOTE — ED PROVIDER NOTE - PHYSICAL EXAMINATION
Constitutional: Tachypneic, unable to speak full sentences on 2L O2.   Head: Atraumatic.  Eyes: PERRLA. EOMI without discomfort.   ENT: No nasal discharge. Mucous membranes moist.  Neck: Supple. Painless ROM.  Cardiovascular: Regular rhythm. Regular rate. Normal S1 and S2. No murmurs. 2+ pulses in all extremities.   Pulmonary: Tachypneic. CTAB. No wheezing, rales, rhonchi.   Abdominal: Soft. LUQ tendneress. No involuntary guarding, rigidity.  Extremities. Pelvis stable. No lower extremity edema. Symmetric calves.  Skin: No rashes.   Neuro: AAOx3. No focal neurological deficits.

## 2019-08-13 NOTE — ED PROVIDER NOTE - CARE PLAN
Principal Discharge DX:	CHF (congestive heart failure)  Secondary Diagnosis:	SOB (shortness of breath) Principal Discharge DX:	CHF (congestive heart failure)  Secondary Diagnosis:	SOB (shortness of breath)  Secondary Diagnosis:	Left upper quadrant pain

## 2019-08-13 NOTE — H&P ADULT - PROBLEM SELECTOR PLAN 1
Actually not definite since CXR seems to be similar in past though she is known to have cardiomyopathy.  At this time unable to obtain ECHO report from study done on June 8. 2019. For now continue usual medication though switch Lasix from oral to IV and ask both her cardiologist and her pulmonologist to see

## 2019-08-13 NOTE — H&P ADULT - HISTORY OF PRESENT ILLNESS
[age]yo female who actually went to see her doctor due to abdominal pain, was referred to ER due to low pulse ox reading in the office. Notes that she has had shortness of breath since her discharge but today it became worse

## 2019-08-13 NOTE — ED PROVIDER NOTE - ADDITIONAL RISK FACTOR FREE TEXT BOX
74f w dyspnea and hypoxia as outpatient w LUQ pain/tender. Hypoxia resolved in ED. nontoxic appearing, n/v intact. No resp distress. Labs, EKG, & imaging reviewed. ASA & Diuretics given. Care d/w Intensivist and approved for Low Risk Tele. Patient admitted to Tele for cardiac eval & further care/management.

## 2019-08-13 NOTE — ED PROVIDER NOTE - PROGRESS NOTE DETAILS
D/w Dr. Reinoso of ICU wants patient on non ccu tele. D/w Dr. grewal D/w Dr. Reinoso of ICU - [facility] patient admitted to non ccu tele. D/w Dr. Moore

## 2019-08-13 NOTE — ED ADULT TRIAGE NOTE - CHIEF COMPLAINT QUOTE
BIBA for SOB. Pt went to [facility] for severe abdomen pain . pT then  Became labored. pt O2 sat 79%RA. PT was placed on a NRB by EMT . Pt uses 02 at home

## 2019-08-13 NOTE — H&P ADULT - NSHPLABSRESULTS_GEN_ALL_CORE
CXR per ER                           12.3   8.22  )-----------( 236      ( 13 Aug 2019 17:24 )             38.6     08-13    141  |  105  |  26<H>  ----------------------------<  89  5.0   |  23  |  1.0    Ca    9.3      13 Aug 2019 17:24  Phos  4.7     08-13  Mg     2.0     08-13    TPro  6.9  /  Alb  4.2  /  TBili  0.4  /  DBili  x   /  AST  95<H>  /  ALT  67<H>  /  AlkPhos  157<H>  08-13            PT/INR - ( 13 Aug 2019 17:24 )   PT: 12.80 sec;   INR: 1.11 ratio         PTT - ( 13 Aug 2019 17:24 )  PTT:33.4 sec  Lactate Trend    CARDIAC MARKERS ( 13 Aug 2019 17:24 )  x     / <0.01 ng/mL / x     / x     / x          CAPILLARY BLOOD GLUCOSE CXR per ER                           12.3   8.22  )-----------( 236      ( 13 Aug 2019 17:24 )             38.6     08-13    141  |  105  |  26<H>  ----------------------------<  89  5.0   |  23  |  1.0    Ca    9.3      13 Aug 2019 17:24  Phos  4.7     08-13  Mg     2.0     08-13    TPro  6.9  /  Alb  4.2  /  TBili  0.4  /  DBili  x   /  AST  95<H>  /  ALT  67<H>  /  AlkPhos  157<H>  08-13            PT/INR - ( 13 Aug 2019 17:24 )   PT: 12.80 sec;   INR: 1.11 ratio         PTT - ( 13 Aug 2019 17:24 )  PTT:33.4 sec  Lactate Trend    CARDIAC MARKERS ( 13 Aug 2019 17:24 )  x     / <0.01 ng/mL / x     / x     / x          CAPILLARY BLOOD GLUCOSE    BNP- 3835 (was 4946 on May 24, 2019)

## 2019-08-14 PROBLEM — F31.9 BIPOLAR DISORDER, UNSPECIFIED: Chronic | Status: ACTIVE | Noted: 2019-07-06

## 2019-08-14 LAB
ALBUMIN SERPL ELPH-MCNC: 4.4 G/DL — SIGNIFICANT CHANGE UP (ref 3.5–5.2)
ALP SERPL-CCNC: 160 U/L — HIGH (ref 30–115)
ALT FLD-CCNC: 61 U/L — HIGH (ref 0–41)
ANION GAP SERPL CALC-SCNC: 14 MMOL/L — SIGNIFICANT CHANGE UP (ref 7–14)
AST SERPL-CCNC: 60 U/L — HIGH (ref 0–41)
BILIRUB SERPL-MCNC: 0.9 MG/DL — SIGNIFICANT CHANGE UP (ref 0.2–1.2)
BUN SERPL-MCNC: 20 MG/DL — SIGNIFICANT CHANGE UP (ref 10–20)
CALCIUM SERPL-MCNC: 9.8 MG/DL — SIGNIFICANT CHANGE UP (ref 8.5–10.1)
CHLORIDE SERPL-SCNC: 101 MMOL/L — SIGNIFICANT CHANGE UP (ref 98–110)
CK MB CFR SERPL CALC: 3.1 NG/ML — SIGNIFICANT CHANGE UP (ref 0.6–6.3)
CK SERPL-CCNC: 39 U/L — SIGNIFICANT CHANGE UP (ref 0–225)
CO2 SERPL-SCNC: 27 MMOL/L — SIGNIFICANT CHANGE UP (ref 17–32)
CREAT SERPL-MCNC: 1 MG/DL — SIGNIFICANT CHANGE UP (ref 0.7–1.5)
GLUCOSE SERPL-MCNC: 94 MG/DL — SIGNIFICANT CHANGE UP (ref 70–99)
HCT VFR BLD CALC: 39.9 % — SIGNIFICANT CHANGE UP (ref 37–47)
HGB BLD-MCNC: 12.8 G/DL — SIGNIFICANT CHANGE UP (ref 12–16)
MCHC RBC-ENTMCNC: 29.8 PG — SIGNIFICANT CHANGE UP (ref 27–31)
MCHC RBC-ENTMCNC: 32.1 G/DL — SIGNIFICANT CHANGE UP (ref 32–37)
MCV RBC AUTO: 92.8 FL — SIGNIFICANT CHANGE UP (ref 81–99)
NRBC # BLD: 0 /100 WBCS — SIGNIFICANT CHANGE UP (ref 0–0)
PLATELET # BLD AUTO: 217 K/UL — SIGNIFICANT CHANGE UP (ref 130–400)
POTASSIUM SERPL-MCNC: 3.5 MMOL/L — SIGNIFICANT CHANGE UP (ref 3.5–5)
POTASSIUM SERPL-SCNC: 3.5 MMOL/L — SIGNIFICANT CHANGE UP (ref 3.5–5)
PROT SERPL-MCNC: 7 G/DL — SIGNIFICANT CHANGE UP (ref 6–8)
RBC # BLD: 4.3 M/UL — SIGNIFICANT CHANGE UP (ref 4.2–5.4)
RBC # FLD: 18.2 % — HIGH (ref 11.5–14.5)
SODIUM SERPL-SCNC: 142 MMOL/L — SIGNIFICANT CHANGE UP (ref 135–146)
TROPONIN T SERPL-MCNC: <0.01 NG/ML — SIGNIFICANT CHANGE UP
WBC # BLD: 5.96 K/UL — SIGNIFICANT CHANGE UP (ref 4.8–10.8)
WBC # FLD AUTO: 5.96 K/UL — SIGNIFICANT CHANGE UP (ref 4.8–10.8)

## 2019-08-14 PROCEDURE — 99233 SBSQ HOSP IP/OBS HIGH 50: CPT

## 2019-08-14 PROCEDURE — 93010 ELECTROCARDIOGRAM REPORT: CPT

## 2019-08-14 RX ORDER — FUROSEMIDE 40 MG
40 TABLET ORAL
Refills: 0 | Status: DISCONTINUED | OUTPATIENT
Start: 2019-08-14 | End: 2019-08-15

## 2019-08-14 RX ORDER — DOCUSATE SODIUM 100 MG
100 CAPSULE ORAL THREE TIMES A DAY
Refills: 0 | Status: DISCONTINUED | OUTPATIENT
Start: 2019-08-14 | End: 2019-08-17

## 2019-08-14 RX ORDER — POTASSIUM CHLORIDE 20 MEQ
40 PACKET (EA) ORAL ONCE
Refills: 0 | Status: COMPLETED | OUTPATIENT
Start: 2019-08-14 | End: 2019-08-14

## 2019-08-14 RX ADMIN — Medication 20 MILLIGRAM(S): at 05:17

## 2019-08-14 RX ADMIN — Medication 100 MILLIGRAM(S): at 05:16

## 2019-08-14 RX ADMIN — Medication 100 MILLIGRAM(S): at 21:27

## 2019-08-14 RX ADMIN — BUDESONIDE AND FORMOTEROL FUMARATE DIHYDRATE 2 PUFF(S): 160; 4.5 AEROSOL RESPIRATORY (INHALATION) at 08:47

## 2019-08-14 RX ADMIN — HEPARIN SODIUM 5000 UNIT(S): 5000 INJECTION INTRAVENOUS; SUBCUTANEOUS at 05:17

## 2019-08-14 RX ADMIN — Medication 100 MILLIGRAM(S): at 18:22

## 2019-08-14 RX ADMIN — BUDESONIDE AND FORMOTEROL FUMARATE DIHYDRATE 2 PUFF(S): 160; 4.5 AEROSOL RESPIRATORY (INHALATION) at 00:08

## 2019-08-14 RX ADMIN — HEPARIN SODIUM 5000 UNIT(S): 5000 INJECTION INTRAVENOUS; SUBCUTANEOUS at 21:27

## 2019-08-14 RX ADMIN — Medication 100 MILLIGRAM(S): at 21:28

## 2019-08-14 RX ADMIN — ATORVASTATIN CALCIUM 20 MILLIGRAM(S): 80 TABLET, FILM COATED ORAL at 21:26

## 2019-08-14 RX ADMIN — Medication 40 MILLIGRAM(S): at 18:26

## 2019-08-14 RX ADMIN — Medication 650 MILLIGRAM(S): at 06:51

## 2019-08-14 RX ADMIN — Medication 1 SPRAY(S): at 18:22

## 2019-08-14 RX ADMIN — ESCITALOPRAM OXALATE 10 MILLIGRAM(S): 10 TABLET, FILM COATED ORAL at 11:25

## 2019-08-14 RX ADMIN — Medication 0.5 MILLIGRAM(S): at 20:12

## 2019-08-14 RX ADMIN — GABAPENTIN 100 MILLIGRAM(S): 400 CAPSULE ORAL at 00:00

## 2019-08-14 RX ADMIN — BUDESONIDE AND FORMOTEROL FUMARATE DIHYDRATE 2 PUFF(S): 160; 4.5 AEROSOL RESPIRATORY (INHALATION) at 20:12

## 2019-08-14 RX ADMIN — Medication 100 MILLIGRAM(S): at 00:00

## 2019-08-14 RX ADMIN — Medication 1 APPLICATION(S): at 18:23

## 2019-08-14 RX ADMIN — GABAPENTIN 100 MILLIGRAM(S): 400 CAPSULE ORAL at 15:06

## 2019-08-14 RX ADMIN — Medication 0.5 MILLIGRAM(S): at 00:01

## 2019-08-14 RX ADMIN — FAMOTIDINE 40 MILLIGRAM(S): 10 INJECTION INTRAVENOUS at 21:27

## 2019-08-14 RX ADMIN — GABAPENTIN 100 MILLIGRAM(S): 400 CAPSULE ORAL at 05:16

## 2019-08-14 RX ADMIN — MONTELUKAST 10 MILLIGRAM(S): 4 TABLET, CHEWABLE ORAL at 11:25

## 2019-08-14 RX ADMIN — Medication 0.5 MILLIGRAM(S): at 08:04

## 2019-08-14 RX ADMIN — Medication 81 MILLIGRAM(S): at 11:25

## 2019-08-14 RX ADMIN — Medication 1 SPRAY(S): at 05:16

## 2019-08-14 RX ADMIN — Medication 650 MILLIGRAM(S): at 15:06

## 2019-08-14 RX ADMIN — GABAPENTIN 100 MILLIGRAM(S): 400 CAPSULE ORAL at 21:27

## 2019-08-14 RX ADMIN — AMLODIPINE BESYLATE 2.5 MILLIGRAM(S): 2.5 TABLET ORAL at 05:17

## 2019-08-14 RX ADMIN — Medication 1 APPLICATION(S): at 21:26

## 2019-08-14 RX ADMIN — Medication 40 MILLIEQUIVALENT(S): at 18:27

## 2019-08-14 RX ADMIN — Medication 1 SPRAY(S): at 00:00

## 2019-08-14 NOTE — CONSULT NOTE ADULT - SUBJECTIVE AND OBJECTIVE BOX
CARDIOLOGY CONSULT NOTE     CHIEF COMPLAINT/REASON FOR CONSULT:    HPI:  75yo female who actually went to see her doctor due to abdominal pain, was referred to ER due to low pulse ox reading in the office. Notes that she has had shortness of breath since her discharge but today it became worse (13 Aug 2019 21:25)      PAST MEDICAL & SURGICAL HISTORY:  Bipolar 1 disorder  Allergic reaction  PVD (peripheral vascular disease)  Other emphysema  Other cardiomyopathy  TIA (transient ischemic attack)  COPD (chronic obstructive pulmonary disease)  Depression  Hypertension  History of cholecystectomy      Cardiac Risks:   [x ]HTN, [ ] DM, [ ] Smoking, [x ] FH,  [ ] Lipids        MEDICATIONS:  MEDICATIONS  (STANDING):  amLODIPine   Tablet 2.5 milliGRAM(s) Oral daily  aspirin  chewable 81 milliGRAM(s) Oral daily  atorvastatin 20 milliGRAM(s) Oral at bedtime  buDESOnide  80 MICROgram(s)/formoterol 4.5 MICROgram(s) Inhaler 2 Puff(s) Inhalation two times a day  escitalopram 10 milliGRAM(s) Oral daily  famotidine    Tablet 40 milliGRAM(s) Oral at bedtime  fluticasone propionate 50 MICROgram(s)/spray Nasal Spray 1 Spray(s) Both Nostrils two times a day  furosemide   Injectable 20 milliGRAM(s) IV Push two times a day  gabapentin 100 milliGRAM(s) Oral three times a day  heparin  Injectable 5000 Unit(s) SubCutaneous every 8 hours  metoprolol tartrate 100 milliGRAM(s) Oral two times a day  montelukast 10 milliGRAM(s) Oral daily      FAMILY HISTORY:  No pertinent family history in first degree relatives      SOCIAL HISTORY:      [ ] Marital status  Allergies    codeine (Other (Moderate))  Depakote (Unknown)  losartan (Angioedema)  Risperdal (Other)  verapamil (Short breath; Angioedema)      	    REVIEW OF SYSTEMS:  CONSTITUTIONAL: No fever, weight loss, or fatigue  EYES: No eye pain, visual disturbances, or discharge  ENMT:  No difficulty hearing, tinnitus, vertigo; No sinus or throat pain  NECK: No pain or stiffness  RESPIRATORY: No cough, wheezing, chills or hemoptysis; See above  CARDIOVASCULAR: No chest pain, palpitations, passing out, dizziness, or leg swelling  GASTROINTESTINAL: No abdominal or epigastric pain. No nausea, vomiting, or hematemesis; No diarrhea or constipation. No melena or hematochezia.  GENITOURINARY: No dysuria, frequency, hematuria, or incontinence  NEUROLOGICAL: No headaches, memory loss, loss of strength, numbness, or tremors  SKIN: No itching, burning, rashes, or lesions   	        PHYSICAL EXAM:  T(C): 36.5 (08-14-19 @ 05:15), Max: 36.6 (08-13-19 @ 22:32)  HR: 67 (08-14-19 @ 05:15) (66 - 74)  BP: 134/63 (08-14-19 @ 05:15) (134/63 - 183/87)  RR: 18 (08-14-19 @ 05:15) (18 - 30)  SpO2: 94% (08-14-19 @ 08:41) (90% - 96%)  Wt(kg): --  I&O's Summary    13 Aug 2019 07:01  -  14 Aug 2019 07:00  --------------------------------------------------------  IN: 0 mL / OUT: 1650 mL / NET: -1650 mL        Appearance: Normal	  Psychiatry: A & O x 3, Mood & affect appropriate  HEENT:   Normal oral mucosa, PERRL, EOMI	  Lymphatic: No lymphadenopathy  Cardiovascular: Normal S1 S2,RRR, No JVD, No murmurs  Respiratory: Lungs clear to auscultation	Decreased BS  Gastrointestinal:  Soft, Non-tender, + BS	  Skin: No rashes, No ecchymoses, No cyanosis	  Neurologic: Non-focal  Extremities: Normal range of motion, No clubbing, cyanosis or edema  Vascular: Peripheral pulses palpable 2+ bilaterally      ECG:  	< from: 12 Lead ECG (08.14.19 @ 00:25) >    Diagnosis Line Normal sinus rhythm  Possible Left atrial enlargement  Nonspecific T wave abnormality  Abnormal ECG    Confirmed by Julio Cesar Gray ([id number]) on 8/14/2019 6:57:15 AM    < end of copied text >      	  LABS:	 	    CARDIAC MARKERS:          Serum Pro-Brain Natriuretic Peptide: 3835 pg/mL (08-13 @ 17:24)                            12.8   5.96  )-----------( 217      ( 14 Aug 2019 06:06 )             39.9     08-14    142  |  101  |  20  ----------------------------<  94  3.5   |  27  |  1.0    Ca    9.8      14 Aug 2019 06:06  Phos  4.7     08-13  Mg     2.0     08-13    TPro  7.0  /  Alb  4.4  /  TBili  0.9  /  DBili  x   /  AST  60<H>  /  ALT  61<H>  /  AlkPhos  160<H>  08-14    PT/INR - ( 13 Aug 2019 17:24 )   PT: 12.80 sec;   INR: 1.11 ratio         PTT - ( 13 Aug 2019 17:24 )  PTT:33.4 sec  proBNP: Serum Pro-Brain Natriuretic Peptide: 3835 pg/mL (08-13 @ 17:24)

## 2019-08-14 NOTE — PROGRESS NOTE ADULT - ASSESSMENT
74F h/o COPD on home O2, HTN, Diastolic CHF presented with SOB & LUQ pain. Since her discharge in 6/2019 she had been chronically dyspneic with difficulty ambulating but worsened dyspnea acutely with onset of LUQ sa day PTP.  LUQ pain had resolved by the time of presentation with no associated fever, chills, CP, n/v/d, lightheadedness, syncope. She had actually gone in to see her PMD when a low pulse ox reading in the 70s on RA was noted in the office. CT chest done showed pulmonary edema with small bilateral pleural effusions.      Assessment & Plan:    1. Dyspnea due to Volume overload:  Continue Lasix. Intake & Out put.  Follow up ECHO.   Cardiology following: appreciate input. Recommendations noted.  Stress ECHO out Patient.      2. h/o COPD:  No acute exacerbation.  Pulm following: Continue Diuresis & Symbicort.  Supplemental oxygen.  Follow up out patient with Pulm.      3. FLAVIO:  CPAP qhs.  Sleep study out patient.      4. HTN:  BP stable on Lopressor, Lasix and Amlodipine.        5. DLD:  Continue Statin.      6. Depression:  On Citalopram.      Prophylaxis: Heparin  Code status: Full code    Progress Note Handoff:  Pending consults: None  Pending Tests: None  Significant Test results: CT chest  Family/Patient discussion: Plan of care discussed with patient.  Disposition: pending PT eval.      Attending: Dr. Vernell Perez. Spectra [phone].

## 2019-08-14 NOTE — CONSULT NOTE ADULT - SUBJECTIVE AND OBJECTIVE BOX
Patient is a 74y old  Female who presents with a chief complaint of suspect CHF (14 Aug 2019 09:31)      HPI:  73yo female who actually went to see her doctor due to abdominal pain, was referred to ER due to low pulse ox reading in the office. Notes that she has had shortness of breath since her discharge but today it became worse (13 Aug 2019 21:25)  ct scan showed b/l effusion and congestion   today she feel better     PAST MEDICAL & SURGICAL HISTORY:  Bipolar 1 disorder  Allergic reaction  PVD (peripheral vascular disease)  Other emphysema  Other cardiomyopathy  TIA (transient ischemic attack)  COPD (chronic obstructive pulmonary disease)  Depression  Hypertension  History of cholecystectomy      FAMILY HISTORY:  No pertinent family history in first degree relatives    Family history: No family cardiovascular system   Occupation:  Alochol: Denied  Smoking:  ex  Drug Use: Denied  Marital Status:           Allergies    codeine (Other (Moderate))  Depakote (Unknown)  losartan (Angioedema)  Risperdal (Other)  verapamil (Short breath; Angioedema)    Intolerances        Home Medications:  acetaminophen 325 mg oral tablet: 2 tab(s) orally every 6 hours, As needed, Temp greater or equal to 38C (100.4F), Moderate Pain (4 - 6) (13 Aug 2019 23:36)  amLODIPine 2.5 mg oral tablet: 1 tab(s) orally once a day (13 Aug 2019 23:36)  aspirin 81 mg oral tablet: 1 tab(s) orally once a day (13 Aug 2019 23:36)  atorvastatin 20 mg oral tablet: 1 tab(s) orally once a day (13 Aug 2019 23:36)  escitalopram 10 mg oral tablet: 1 tab(s) orally once a day (13 Aug 2019 23:36)  famotidine 40 mg oral tablet: 1 tab(s) orally once a day (at bedtime) (13 Aug 2019 23:36)  Flonase 50 mcg/inh nasal spray: 1 spray(s) nasal once a day (13 Aug 2019 23:36)  furosemide 20 mg oral tablet: 1 tab(s) orally once a day (13 Aug 2019 23:36)  gabapentin 100 mg oral capsule: 1 cap(s) orally 3 times a day (13 Aug 2019 23:36)  LORazepam 0.5 mg oral tablet: 1 tab(s) orally 2 times a day, As Needed for anxiety (13 Aug 2019 23:36)  Metoprolol Tartrate 100 mg oral tablet: 1 tab(s) orally 2 times a day (13 Aug 2019 23:36)  montelukast 10 mg oral tablet: 1 tab(s) orally once a day (13 Aug 2019 23:36)  Symbicort 80 mcg-4.5 mcg/inh inhalation aerosol: 2 puff(s) inhaled 2 times a day (13 Aug 2019 23:36)      ROS: as in HPI; All other systems reviewed are negative        PHYSICAL EXAM:  Vital Signs Last 24 Hrs  T(C): 36.5 (14 Aug 2019 05:15), Max: 36.6 (13 Aug 2019 22:32)  T(F): 97.7 (14 Aug 2019 05:15), Max: 97.8 (13 Aug 2019 22:32)  HR: 67 (14 Aug 2019 05:15) (66 - 74)  BP: 134/63 (14 Aug 2019 05:15) (134/63 - 183/87)  BP(mean): --  RR: 18 (14 Aug 2019 05:15) (18 - 30)  SpO2: 94% (14 Aug 2019 08:41) (90% - 96%)      GENERAL: NAD, well-groomed, well-developed  HEAD:  Atraumatic, Normocephalic  EYES: EOMI, PERRLA, conjunctiva and sclera clear  ENMT: No tonsillar erythema, exudates, or enlargement; Moist mucous membranes, Good dentition, No lesions  NECK: Supple, No JVD, Normal thyroid  NERVOUS SYSTEM:  Alert & Oriented X3, Good concentration; Motor Strength 5/5 B/L upper and lower extremities; DTRs 2+ intact and symmetric  CHEST/LUNG: Clear to percussion bilaterally; No rales, rhonchi, wheezing, or rubs  HEART: Regular rate and rhythm; No murmurs, rubs, or gallops  ABDOMEN: Soft, Nontender, Nondistended; Bowel sounds present  EXTREMITIES:  2+ Peripheral Pulses, No clubbing, cyanosis, or edema  LYMPH: No lymphadenopathy noted  SKIN: No rashes or lesions    HOSPITAL MEDICATIONS:  MEDICATIONS  (STANDING):  amLODIPine   Tablet 2.5 milliGRAM(s) Oral daily  aspirin  chewable 81 milliGRAM(s) Oral daily  atorvastatin 20 milliGRAM(s) Oral at bedtime  buDESOnide  80 MICROgram(s)/formoterol 4.5 MICROgram(s) Inhaler 2 Puff(s) Inhalation two times a day  escitalopram 10 milliGRAM(s) Oral daily  famotidine    Tablet 40 milliGRAM(s) Oral at bedtime  fluticasone propionate 50 MICROgram(s)/spray Nasal Spray 1 Spray(s) Both Nostrils two times a day  furosemide   Injectable 20 milliGRAM(s) IV Push two times a day  gabapentin 100 milliGRAM(s) Oral three times a day  heparin  Injectable 5000 Unit(s) SubCutaneous every 8 hours  metoprolol tartrate 100 milliGRAM(s) Oral two times a day  montelukast 10 milliGRAM(s) Oral daily    MEDICATIONS  (PRN):  acetaminophen   Tablet .. 650 milliGRAM(s) Oral every 6 hours PRN Mild Pain (1 - 3)  LORazepam     Tablet 0.5 milliGRAM(s) Oral two times a day PRN Anxiety      LABS:                        12.8   5.96  )-----------( 217      ( 14 Aug 2019 06:06 )             39.9     08-14    142  |  101  |  20  ----------------------------<  94  3.5   |  27  |  1.0    Ca    9.8      14 Aug 2019 06:06  Phos  4.7     08-13  Mg     2.0     08-13    TPro  7.0  /  Alb  4.4  /  TBili  0.9  /  DBili  x   /  AST  60<H>  /  ALT  61<H>  /  AlkPhos  160<H>  08-14    PT/INR - ( 13 Aug 2019 17:24 )   PT: 12.80 sec;   INR: 1.11 ratio         PTT - ( 13 Aug 2019 17:24 )  PTT:33.4 sec      Venous Blood Gas:  08-13 @ 17:47  7.33/50/25/26/36  VBG Lactate: 0.7        < from: CT Angio Chest w/ IV Cont (08.13.19 @ 19:54) >  1. No pulmonary embolus.    2. Interstitial pattern edema with small bilateral pleural effusions.    3. Mild mediastinal lymphadenopathy, likely reactive.    4. No CT evidence of acute abdominopelvic pathology.    < end of copied text >    RADIOLOGY:  [ ] Reviewed and interpreted by me    ECHO:    Point of Care Ultrasound Findings;    PFT:

## 2019-08-15 DIAGNOSIS — I50.9 HEART FAILURE, UNSPECIFIED: ICD-10-CM

## 2019-08-15 LAB
ALBUMIN SERPL ELPH-MCNC: 4.4 G/DL — SIGNIFICANT CHANGE UP (ref 3.5–5.2)
ALP SERPL-CCNC: 147 U/L — HIGH (ref 30–115)
ALT FLD-CCNC: 45 U/L — HIGH (ref 0–41)
ANION GAP SERPL CALC-SCNC: 13 MMOL/L — SIGNIFICANT CHANGE UP (ref 7–14)
AST SERPL-CCNC: 36 U/L — SIGNIFICANT CHANGE UP (ref 0–41)
BILIRUB DIRECT SERPL-MCNC: <0.2 MG/DL — SIGNIFICANT CHANGE UP (ref 0–0.2)
BILIRUB INDIRECT FLD-MCNC: >0.3 MG/DL — SIGNIFICANT CHANGE UP (ref 0.2–1.2)
BILIRUB SERPL-MCNC: 0.5 MG/DL — SIGNIFICANT CHANGE UP (ref 0.2–1.2)
BUN SERPL-MCNC: 27 MG/DL — HIGH (ref 10–20)
CALCIUM SERPL-MCNC: 9.9 MG/DL — SIGNIFICANT CHANGE UP (ref 8.5–10.1)
CHLORIDE SERPL-SCNC: 103 MMOL/L — SIGNIFICANT CHANGE UP (ref 98–110)
CO2 SERPL-SCNC: 28 MMOL/L — SIGNIFICANT CHANGE UP (ref 17–32)
CREAT SERPL-MCNC: 0.9 MG/DL — SIGNIFICANT CHANGE UP (ref 0.7–1.5)
GLUCOSE SERPL-MCNC: 88 MG/DL — SIGNIFICANT CHANGE UP (ref 70–99)
POTASSIUM SERPL-MCNC: 4 MMOL/L — SIGNIFICANT CHANGE UP (ref 3.5–5)
POTASSIUM SERPL-SCNC: 4 MMOL/L — SIGNIFICANT CHANGE UP (ref 3.5–5)
PROT SERPL-MCNC: 7.1 G/DL — SIGNIFICANT CHANGE UP (ref 6–8)
SODIUM SERPL-SCNC: 144 MMOL/L — SIGNIFICANT CHANGE UP (ref 135–146)

## 2019-08-15 PROCEDURE — 99233 SBSQ HOSP IP/OBS HIGH 50: CPT

## 2019-08-15 RX ORDER — ACETAMINOPHEN 500 MG
650 TABLET ORAL EVERY 6 HOURS
Refills: 0 | Status: DISCONTINUED | OUTPATIENT
Start: 2019-08-15 | End: 2019-08-17

## 2019-08-15 RX ORDER — FUROSEMIDE 40 MG
40 TABLET ORAL
Refills: 0 | Status: DISCONTINUED | OUTPATIENT
Start: 2019-08-15 | End: 2019-08-16

## 2019-08-15 RX ORDER — ONDANSETRON 8 MG/1
4 TABLET, FILM COATED ORAL EVERY 6 HOURS
Refills: 0 | Status: DISCONTINUED | OUTPATIENT
Start: 2019-08-15 | End: 2019-08-17

## 2019-08-15 RX ORDER — TRAMADOL HYDROCHLORIDE 50 MG/1
50 TABLET ORAL EVERY 8 HOURS
Refills: 0 | Status: DISCONTINUED | OUTPATIENT
Start: 2019-08-15 | End: 2019-08-16

## 2019-08-15 RX ORDER — GABAPENTIN 400 MG/1
100 CAPSULE ORAL AT BEDTIME
Refills: 0 | Status: DISCONTINUED | OUTPATIENT
Start: 2019-08-15 | End: 2019-08-15

## 2019-08-15 RX ADMIN — MONTELUKAST 10 MILLIGRAM(S): 4 TABLET, CHEWABLE ORAL at 13:00

## 2019-08-15 RX ADMIN — GABAPENTIN 100 MILLIGRAM(S): 400 CAPSULE ORAL at 06:07

## 2019-08-15 RX ADMIN — Medication 100 MILLIGRAM(S): at 18:13

## 2019-08-15 RX ADMIN — Medication 100 MILLIGRAM(S): at 06:07

## 2019-08-15 RX ADMIN — HEPARIN SODIUM 5000 UNIT(S): 5000 INJECTION INTRAVENOUS; SUBCUTANEOUS at 22:07

## 2019-08-15 RX ADMIN — BUDESONIDE AND FORMOTEROL FUMARATE DIHYDRATE 2 PUFF(S): 160; 4.5 AEROSOL RESPIRATORY (INHALATION) at 22:06

## 2019-08-15 RX ADMIN — TRAMADOL HYDROCHLORIDE 50 MILLIGRAM(S): 50 TABLET ORAL at 18:13

## 2019-08-15 RX ADMIN — Medication 1 SPRAY(S): at 18:12

## 2019-08-15 RX ADMIN — Medication 1 APPLICATION(S): at 13:00

## 2019-08-15 RX ADMIN — BUDESONIDE AND FORMOTEROL FUMARATE DIHYDRATE 2 PUFF(S): 160; 4.5 AEROSOL RESPIRATORY (INHALATION) at 08:54

## 2019-08-15 RX ADMIN — Medication 1 APPLICATION(S): at 06:08

## 2019-08-15 RX ADMIN — GABAPENTIN 100 MILLIGRAM(S): 400 CAPSULE ORAL at 22:06

## 2019-08-15 RX ADMIN — Medication 650 MILLIGRAM(S): at 13:00

## 2019-08-15 RX ADMIN — Medication 100 MILLIGRAM(S): at 13:00

## 2019-08-15 RX ADMIN — Medication 40 MILLIGRAM(S): at 18:14

## 2019-08-15 RX ADMIN — Medication 40 MILLIGRAM(S): at 06:08

## 2019-08-15 RX ADMIN — Medication 81 MILLIGRAM(S): at 12:59

## 2019-08-15 RX ADMIN — ATORVASTATIN CALCIUM 20 MILLIGRAM(S): 80 TABLET, FILM COATED ORAL at 22:04

## 2019-08-15 RX ADMIN — Medication 650 MILLIGRAM(S): at 09:30

## 2019-08-15 RX ADMIN — Medication 650 MILLIGRAM(S): at 13:44

## 2019-08-15 RX ADMIN — Medication 100 MILLIGRAM(S): at 22:04

## 2019-08-15 RX ADMIN — Medication 1 APPLICATION(S): at 22:07

## 2019-08-15 RX ADMIN — Medication 650 MILLIGRAM(S): at 18:13

## 2019-08-15 RX ADMIN — FAMOTIDINE 40 MILLIGRAM(S): 10 INJECTION INTRAVENOUS at 22:04

## 2019-08-15 RX ADMIN — ESCITALOPRAM OXALATE 10 MILLIGRAM(S): 10 TABLET, FILM COATED ORAL at 12:58

## 2019-08-15 RX ADMIN — Medication 0.5 MILLIGRAM(S): at 12:59

## 2019-08-15 RX ADMIN — HEPARIN SODIUM 5000 UNIT(S): 5000 INJECTION INTRAVENOUS; SUBCUTANEOUS at 06:07

## 2019-08-15 RX ADMIN — Medication 650 MILLIGRAM(S): at 08:39

## 2019-08-15 RX ADMIN — Medication 0.5 MILLIGRAM(S): at 22:33

## 2019-08-15 RX ADMIN — Medication 1 SPRAY(S): at 06:08

## 2019-08-15 RX ADMIN — AMLODIPINE BESYLATE 2.5 MILLIGRAM(S): 2.5 TABLET ORAL at 06:07

## 2019-08-15 RX ADMIN — GABAPENTIN 100 MILLIGRAM(S): 400 CAPSULE ORAL at 12:59

## 2019-08-15 RX ADMIN — HEPARIN SODIUM 5000 UNIT(S): 5000 INJECTION INTRAVENOUS; SUBCUTANEOUS at 12:59

## 2019-08-15 NOTE — CONSULT NOTE ADULT - SUBJECTIVE AND OBJECTIVE BOX
HPI:  74 y o female who actually went to see her doctor due to abdominal pain, was referred to ER due to low pulse ox reading in the office. Notes that she has had shortness of breath since her discharge but today it became worse ptn seen  on  bed side  c/o ubp mostly muscle   PTN  REFERRED TO ACUTE  REHAB  FOR  EVAL AND  TX   PAST MEDICAL & SURGICAL HISTORY:  Bipolar 1 disorder  Allergic reaction  PVD (peripheral vascular disease)  Other emphysema  Other cardiomyopathy  TIA (transient ischemic attack)  COPD (chronic obstructive pulmonary disease)  Depression  Hypertension  History of cholecystectomy  Hospital Course:  TODAY'S SUBJECTIVE & REVIEW OF SYMPTOMS:     Constitutional WNL   Cardio WNL   Resp WNL   GI WNL  Heme WNL  Endo WNL  Skin WNL  MSK WNL  Neuro WNL  Cognitive WNL  Psych WNL      MEDICATIONS  (STANDING):  amLODIPine   Tablet 2.5 milliGRAM(s) Oral daily  aspirin  chewable 81 milliGRAM(s) Oral daily  atorvastatin 20 milliGRAM(s) Oral at bedtime  buDESOnide  80 MICROgram(s)/formoterol 4.5 MICROgram(s) Inhaler 2 Puff(s) Inhalation two times a day  docusate sodium 100 milliGRAM(s) Oral three times a day  escitalopram 10 milliGRAM(s) Oral daily  famotidine    Tablet 40 milliGRAM(s) Oral at bedtime  fluticasone propionate 50 MICROgram(s)/spray Nasal Spray 1 Spray(s) Both Nostrils two times a day  furosemide   Injectable 40 milliGRAM(s) IV Push two times a day  gabapentin 100 milliGRAM(s) Oral three times a day  heparin  Injectable 5000 Unit(s) SubCutaneous every 8 hours  metoprolol tartrate 100 milliGRAM(s) Oral two times a day  montelukast 10 milliGRAM(s) Oral daily  silver sulfADIAZINE 1% Cream 1 Application(s) Topical three times a day    MEDICATIONS  (PRN):  acetaminophen   Tablet .. 650 milliGRAM(s) Oral every 6 hours PRN Mild Pain (1 - 3)  LORazepam     Tablet 0.5 milliGRAM(s) Oral two times a day PRN Anxiety      FAMILY HISTORY:  No pertinent family history in first degree relatives      Allergies    codeine (Other (Moderate))  Depakote (Unknown)  losartan (Angioedema)  Risperdal (Other)  verapamil (Short breath; Angioedema)    Intolerances        SOCIAL HISTORY:    [  ] Etoh  [  ] Smoking  [  ] Substance abuse     Home Environment:  [  ] Home Alone  [ xx ] Lives with Family  [  ] Home Health Aid    Dwelling:  [  ] Apartment  [ x ] Private House  [  ] Adult Home  [  ] Skilled Nursing Facility      [  ] Short Term  [  ] Long Term  [ x ] Stairs       Elevator [  ]    FUNCTIONAL STATUS PTA: (Check all that apply)  Ambulation: [ x  ]Independent    [  ] Dependent     [  ] Non-Ambulatory  Assistive Device: [  ] SA Cane  [  ]  Q Cane  [  ] Walker  [  ]  Wheelchair  ADL : [ x ] Independent  [  ]  Dependent       Vital Signs Last 24 Hrs  T(C): 36 (15 Aug 2019 05:00), Max: 36.2 (14 Aug 2019 21:05)  T(F): 96.8 (15 Aug 2019 05:00), Max: 97.2 (14 Aug 2019 21:05)  HR: 65 (15 Aug 2019 05:00) (62 - 70)  BP: 131/63 (15 Aug 2019 05:00) (127/62 - 160/66)  BP(mean): --  RR: 16 (15 Aug 2019 05:00) (16 - 18)  SpO2: 94% (14 Aug 2019 08:41) (94% - 94%)      PHYSICAL EXAM: Alert & Oriented X3  GENERAL: NAD, well-groomed, well-developed  HEAD:  Atraumatic, Normocephalic  EYES: EOMI, PERRLA, conjunctiva and sclera clear  NECK: Supple, No JVD, Normal thyroid  CHEST/LUNG: Clear to percussion bilaterally; No rales, rhonchi, wheezing, or rubs  HEART: Regular rate and rhythm; No murmurs, rubs, or gallops  ABDOMEN: Soft, Nontender, Nondistended; Bowel sounds present  EXTREMITIES:  2+ Peripheral Pulses, No clubbing, cyanosis, or edema    NERVOUS SYSTEM:  Cranial Nerves 2-12 intact [x  ] Abnormal  [  ]  ROM: WFL all extremities [  x]  Abnormal [  ]  Motor Strength: WFL all extremities  [x  ]  Abnormal [  ]  Sensation: intact to light touch [x  ] Abnormal [  ]  Reflexes: Symmetric [x  ]  Abnormal [  ]    FUNCTIONAL STATUS:  Bed Mobility: Independent [ x ]  Supervision [  ]  Needs Assistance [  ]  N/A [  ]  Transfers: Independent [ x ]  Supervision [  ]  Needs Assistance [  ]  N/A [  ]   Ambulation: Independent [  ]  Supervision [ x ]  Needs Assistance [  ]  N/A [  ]  ADL: Independent [ x ] Requires Assistance [  ] N/A [  ]  SEE PT/OT IE NOTES    LABS:                        12.8   5.96  )-----------( 217      ( 14 Aug 2019 06:06 )             39.9     08-14    142  |  101  |  20  ----------------------------<  94  3.5   |  27  |  1.0    Ca    9.8      14 Aug 2019 06:06  Phos  4.7     08-13  Mg     2.0     08-13    TPro  7.0  /  Alb  4.4  /  TBili  0.9  /  DBili  x   /  AST  60<H>  /  ALT  61<H>  /  AlkPhos  160<H>  08-14    PT/INR - ( 13 Aug 2019 17:24 )   PT: 12.80 sec;   INR: 1.11 ratio         PTT - ( 13 Aug 2019 17:24 )  PTT:33.4 sec      RADIOLOGY & ADDITIONAL STUDIES:    Assesment:

## 2019-08-15 NOTE — PROGRESS NOTE ADULT - ASSESSMENT
Impression:  SOB   CHF exacerbation fluid over laod   FLAVIO/ OHS         Plan:  consider switching to PO lasix   daily weight   continue symbicort 80 Q 12 hrs   follow cardiology   pending sleep study as outpatient   recall PRN

## 2019-08-15 NOTE — CHART NOTE - NSCHARTNOTEFT_GEN_A_CORE
Patient has order for Gabapentin 100mg 3x per day and at bedtime. Will make order TID the last dose to be given at 2200 based on chart review

## 2019-08-15 NOTE — CONSULT NOTE ADULT - PROBLEM SELECTOR RECOMMENDATION 9
Change Acetaminophen   Tablet .. 650 milliGRAM(s) Oral every 6 hours Wiley  gabapentin 100 milliGRAM(s) Oral three times a day  LORazepam     Tablet 0.5 milliGRAM(s) Oral two times a day PRN  Warm compress to painful area Q1hr x20 mins PRN Con't  Acetaminophen   Tablet .. 650 milliGRAM(s) Oral every 6 hours Wiley  Con't gabapentin 100 milliGRAM(s) Oral three times a day  Con't LORazepam     Tablet 0.5 milliGRAM(s) Oral two times a day PRN  Con't Warm compress to painful area Q1hr x20 mins PRN  Con't Tramadol 50mg PO Q8hrs PRN

## 2019-08-15 NOTE — PHYSICAL THERAPY INITIAL EVALUATION ADULT - DISCHARGE DISPOSITION, PT EVAL
home w/ outpatient services/pt independent with sit<>stand transfer, gait x RW, will benefit from outpatient PT

## 2019-08-15 NOTE — PROGRESS NOTE ADULT - ASSESSMENT
74F h/o COPD on home O2, HTN, Diastolic CHF presented with SOB & LUQ pain. Since her discharge in 6/2019 she had been chronically dyspneic with difficulty ambulating but worsened dyspnea acutely with onset of LUQ sa day PTP.  LUQ pain had resolved by the time of presentation with no associated fever, chills, CP, n/v/d, lightheadedness, syncope. She had actually gone in to see her PMD when a low pulse ox reading in the 70s on RA was noted in the office. CT chest done showed pulmonary edema with small bilateral pleural effusions.      Assessment & Plan:    1. Dyspnea due to Volume overload:  Continue PO Lasix.  Intake & Output.  ECHO: 8/14/19: LVEF >55%, Diastolic Dysfunction.   Cardiology following: appreciate input. Recommendations noted.  Stress ECHO out Patient.      2. h/o COPD:  No acute exacerbation.  Pulm following: Continue Diuresis & Symbicort.  Supplemental oxygen.  Follow up out patient with Pulm.      3. FLAVIO:  CPAP qhs.  Sleep study out patient.      4. HTN:  BP stable on Lopressor, Lasix and Amlodipine.        5. DLD:  Continue Statin.      6. Depression:  On Citalopram.      Prophylaxis: Heparin  Code status: Full code    Progress Note Handoff:  Pending consults: None  Pending Tests: None  Significant Test results: CT chest  Family/Patient discussion: Plan of care discussed with patient.  Disposition: Home with Home in the AM.      Attending: Dr. Vernell Perez. Spectra 1503.

## 2019-08-15 NOTE — CONSULT NOTE ADULT - SUBJECTIVE AND OBJECTIVE BOX
Chief Complaint:     Discussed with Dr. Portillo. Will s/u tomorrow.   73yo female with c/o abdominal pain and referred by PMD for low pulse Ox.       Allergies    codeine (Other (Moderate))  Depakote (Unknown)  losartan (Angioedema)  Risperdal (Other)  verapamil (Short breath; Angioedema)    Intolerances        PAST MEDICAL & SURGICAL HISTORY:  Bipolar 1 disorder  Allergic reaction  PVD (peripheral vascular disease)  Other emphysema  Other cardiomyopathy  TIA (transient ischemic attack)  COPD (chronic obstructive pulmonary disease)  Depression  Hypertension  History of cholecystectomy        SOCIAL HISTORY:  Denies Smoking, Alcohol, or Drug Use    codeine (Other (Moderate))  Depakote (Unknown)  losartan (Angioedema)  Risperdal (Other)  verapamil (Short breath; Angioedema)      PAIN MEDICATIONS:  acetaminophen   Tablet .. 650 milliGRAM(s) Oral every 6 hours PRN  escitalopram 10 milliGRAM(s) Oral daily  gabapentin 100 milliGRAM(s) Oral three times a day  LORazepam     Tablet 0.5 milliGRAM(s) Oral two times a day PRN    Heme:  aspirin  chewable 81 milliGRAM(s) Oral daily  heparin  Injectable 5000 Unit(s) SubCutaneous every 8 hours    Antibiotics:    Cardiovascular:  amLODIPine   Tablet 2.5 milliGRAM(s) Oral daily  furosemide    Tablet 40 milliGRAM(s) Oral two times a day  metoprolol tartrate 100 milliGRAM(s) Oral two times a day    GI:  docusate sodium 100 milliGRAM(s) Oral three times a day  famotidine    Tablet 40 milliGRAM(s) Oral at bedtime    Endocrine:  atorvastatin 20 milliGRAM(s) Oral at bedtime    All Other Medications:  fluticasone propionate 50 MICROgram(s)/spray Nasal Spray 1 Spray(s) Both Nostrils two times a day  silver sulfADIAZINE 1% Cream 1 Application(s) Topical three times a day      Vital Signs Last 24 Hrs  T(C): 36.4 (15 Aug 2019 13:58), Max: 36.4 (15 Aug 2019 13:58)  T(F): 97.5 (15 Aug 2019 13:58), Max: 97.5 (15 Aug 2019 13:58)  HR: 63 (15 Aug 2019 13:58) (63 - 70)  BP: 134/59 (15 Aug 2019 13:58) (127/62 - 160/66)  BP(mean): --  RR: 16 (15 Aug 2019 13:58) (16 - 16)  SpO2: 99% (15 Aug 2019 08:41) (99% - 99%)      08-14-19 @ 07:01  -  08-15-19 @ 07:00  --------------------------------------------------------  IN: 460 mL / OUT: 3150 mL / NET: -2690 mL    08-15-19 @ 07:01  -  08-15-19 @ 15:06  --------------------------------------------------------  IN: 620 mL / OUT: 300 mL / NET: 320 mL        LABS:                          12.8   5.96  )-----------( 217      ( 14 Aug 2019 06:06 )             39.9     08-15    144  |  103  |  27<H>  ----------------------------<  88  4.0   |  28  |  0.9    Ca    9.9      15 Aug 2019 06:49  Phos  4.7     08-13  Mg     2.0     08-13    TPro  7.1  /  Alb  4.4  /  TBili  0.5  /  DBili  <0.2  /  AST  36  /  ALT  45<H>  /  AlkPhos  147<H>  08-15    PT/INR - ( 13 Aug 2019 17:24 )   PT: 12.80 sec;   INR: 1.11 ratio         PTT - ( 13 Aug 2019 17:24 )  PTT:33.4 sec      RADIOLOGY:  EXAM:  CT ABDOMEN AND PELVIS IC        EXAM:  CT ANGIO CHEST (W)AW IC            PROCEDURE DATE:  08/13/2019            INTERPRETATION:  CLINICAL STATEMENT: Shortness of breath and left upper   quadrant abdominal pain. Rule out pulmonary embolus    TECHNIQUE: Multislice helical sections were obtained from the thoracic   inlet to the lung bases during rapid administration of 100cc Optiray 320   intravenous contrast using a CTA protocol. Subsequently, axial CT   sections were obtained from the domes of the diaphragms to the pubic   symphysis. Thin sections were reconstructed through the pulmonary   vasculature. Sagittal and coronal reformatted images were acquired, as   well as MIP reconstructed images.    COMPARISON CT: CTA chest 5/24/2019. CT dissection dated 11/16/2018.      FINDINGS:    CHEST:     PULMONARY EMBOLUS: No central or segmental pulmonary embolus.    LUNGS/PLEURA/AIRWAYS: Central airways are patent. Upper lobe predominant   emphysema and biapical scarring. Interstitial pulmonary edema bilateral   pleural effusions with adjacent atelectasis. No pneumothorax.    MEDIASTINUM/THORACIC NODES: Stable prominent mediastinal lymph nodes   measuring up to 1.2 cm in the left para-aortic region (series 3/183).     HEART/GREAT VESSELS: Stable cardiomegaly and trace pericardial effusion.   Mild pulmonary artery dilatation measuring up to 3.2 cm. Coronary and   aortic vascular calcifications. Mitral annular effusions.      ABDOMEN/PELVIS:    HEPATOBILIARY: Postcholecystectomy.    SPLEEN: Unremarkable.    PANCREAS: Unremarkable.    ADRENAL GLANDS: Unremarkable.    KIDNEYS: Right-sided duplicated collecting systems. Symmetric enhancement   bilaterally. No hydronephrosis. Bilateral cysts and additional   subcentimeter hypodensities to small to characterize.    ABDOMINOPELVIC NODES: Unremarkable.    PELVIC ORGANS: Unremarkable.    PERITONEUM/MESENTERY/BOWEL: No evidence of bowel obstruction,   pneumoperitoneum or ascites. Appendix is unremarkable. Diverticulosis   without evidence of diverticulitis. Small hiatal hernia.    BONES/SOFT TISSUES: Degenerative changes of the spine. No acute osseous   abnormality.    OTHER: Atherosclerotic vascular calcifications. Stable small infrarenal   aortic aneurysm measuring 1.8 cm, measuring at the same level.    IMPRESSION:    1. No pulmonary embolus.    2. Interstitial pattern edema with small bilateral pleural effusions.    3. Mild mediastinal lymphadenopathy, likely reactive.    4. No CT evidence of acute abdominopelvic pathology.      KONSTANTIN GENAO M.D., RESIDENT RADIOLOGIST  This document has been electronically signed.  YOSI GORDILLO M.D., ATTENDING RADIOLOGIST  This document has been electronically signed. Aug 13 2019  8:48PM          EXAM:  US ABDOMEN LIMITED            PROCEDURE DATE:  08/13/2019            INTERPRETATION:  CLINICAL HISTORY: Right upper quadrant pain.    COMPARISON: Ultrasound of the abdomen dated 11/17/2018.    PROCEDURE: Ultrasound of the right upper quadrant was performed.    FINDINGS:    LIVER: Heterogeneous echotexture, measuring 14.6 cm in length.    GALLBLADDER/BILIARY TREE: Status post cholecystectomy. No intrahepatic   biliary ductal dilatation. The common bile duct measures 8 mm, mildly   dilated likely due to post cystectomy.    PANCREAS:  Visualized head and body are unremarkable. Remainder obscured   by overlying bowel gas.    KIDNEY:  Right kidney measures 9.4 cm in length and contains an   interpolar cyst measuring 1.3 x 1.2 x 1.0 cm. No hydronephrosis or   calculi.    AORTA/IVC:  Visualized proximal portions unremarkable.    ASCITES:  None.    IMPRESSION:    Heterogeneously echogenic liver, consistent with hepatocellular disease.    Status post cholecystectomy.      SHILPA SAGASTUME M.D., RESIDENT RADIOLOGIST  This document has been electronically signed.  YOSI GORDILLO M.D., ATTENDING RADIOLOGIST  This document has been electronically signed. Aug 13 2019  8:35PM      Drug Screen:        [ ]  [place]  Reviewed on 8/15/19, 3:15P  Patient Name:	Latanya Henderson	YOB: 1945  Address:	[address] [place] [address]	Sex:	Female  Rx Written	Rx Dispensed	Drug	Quantity	Days Supply	Prescriber Name  06/28/2019	07/14/2019	lorazepam 0.5 mg tablet	60	30	Estefani Javier FNP  06/13/2019	06/14/2019	lorazepam 0.5 mg tablet	60	30	Estefani Javier FNP  04/28/2019	05/06/2019	lorazepam 0.5 mg tablet	60	30	Nellie, Detty  03/31/2019	04/06/2019	lorazepam 0.5 mg tablet	60	30	Nellie, Detty  03/03/2019	03/07/2019	lorazepam 0.5 mg tablet	60	30	Nellie, Detty  02/03/2019	02/05/2019	lorazepam 0.5 mg tablet	60	30	Nellie, Detty  12/09/2018	01/06/2019	lorazepam 0.5 mg tablet	60	30	Nellie, Detty  11/11/2018	12/07/2018	lorazepam 0.5 mg tablet	60	30	Nellie, Detty  10/14/2018	10/22/2018	lorazepam 0.5 mg tablet	60	30	Nellie, Detty  09/19/2018	09/23/2018	lorazepam 0.5 mg tablet	60	30	Olivia Ruiz MD  08/05/2018	08/23/2018	lorazepam 0.5 mg tablet	60	30	Nellie, Ernie Chief Complaint:     Discussed with Dr. Portillo. Will s/u tomorrow.   75yo female with c/o right hip/thigh pain for couple of days. Rec'd injection to the right hip from Dr. Vigil for pain management.        Allergies    codeine (Other (Moderate))  Depakote (Unknown)  losartan (Angioedema)  Risperdal (Other)  verapamil (Short breath; Angioedema)    Intolerances        PAST MEDICAL & SURGICAL HISTORY:  Bipolar 1 disorder  Allergic reaction  PVD (peripheral vascular disease)  Other emphysema  Other cardiomyopathy  TIA (transient ischemic attack)  COPD (chronic obstructive pulmonary disease)  Depression  Hypertension  History of cholecystectomy        SOCIAL HISTORY:  Denies Smoking, Alcohol, or Drug Use    codeine (Other (Moderate))  Depakote (Unknown)  losartan (Angioedema)  Risperdal (Other)  verapamil (Short breath; Angioedema)      PAIN MEDICATIONS:  acetaminophen   Tablet .. 650 milliGRAM(s) Oral every 6 hours PRN  escitalopram 10 milliGRAM(s) Oral daily  gabapentin 100 milliGRAM(s) Oral three times a day  LORazepam     Tablet 0.5 milliGRAM(s) Oral two times a day PRN    Heme:  aspirin  chewable 81 milliGRAM(s) Oral daily  heparin  Injectable 5000 Unit(s) SubCutaneous every 8 hours    Antibiotics:    Cardiovascular:  amLODIPine   Tablet 2.5 milliGRAM(s) Oral daily  furosemide    Tablet 40 milliGRAM(s) Oral two times a day  metoprolol tartrate 100 milliGRAM(s) Oral two times a day    GI:  docusate sodium 100 milliGRAM(s) Oral three times a day  famotidine    Tablet 40 milliGRAM(s) Oral at bedtime    Endocrine:  atorvastatin 20 milliGRAM(s) Oral at bedtime    All Other Medications:  fluticasone propionate 50 MICROgram(s)/spray Nasal Spray 1 Spray(s) Both Nostrils two times a day  silver sulfADIAZINE 1% Cream 1 Application(s) Topical three times a day      Vital Signs Last 24 Hrs  T(C): 36.4 (15 Aug 2019 13:58), Max: 36.4 (15 Aug 2019 13:58)  T(F): 97.5 (15 Aug 2019 13:58), Max: 97.5 (15 Aug 2019 13:58)  HR: 63 (15 Aug 2019 13:58) (63 - 70)  BP: 134/59 (15 Aug 2019 13:58) (127/62 - 160/66)  BP(mean): --  RR: 16 (15 Aug 2019 13:58) (16 - 16)  SpO2: 99% (15 Aug 2019 08:41) (99% - 99%)      08-14-19 @ 07:01  -  08-15-19 @ 07:00  --------------------------------------------------------  IN: 460 mL / OUT: 3150 mL / NET: -2690 mL    08-15-19 @ 07:01  -  08-15-19 @ 15:06  --------------------------------------------------------  IN: 620 mL / OUT: 300 mL / NET: 320 mL        LABS:                          12.8   5.96  )-----------( 217      ( 14 Aug 2019 06:06 )             39.9     08-15    144  |  103  |  27<H>  ----------------------------<  88  4.0   |  28  |  0.9    Ca    9.9      15 Aug 2019 06:49  Phos  4.7     08-13  Mg     2.0     08-13    TPro  7.1  /  Alb  4.4  /  TBili  0.5  /  DBili  <0.2  /  AST  36  /  ALT  45<H>  /  AlkPhos  147<H>  08-15    PT/INR - ( 13 Aug 2019 17:24 )   PT: 12.80 sec;   INR: 1.11 ratio         PTT - ( 13 Aug 2019 17:24 )  PTT:33.4 sec      RADIOLOGY:  EXAM:  CT ABDOMEN AND PELVIS IC        EXAM:  CT ANGIO CHEST (W)AW IC            PROCEDURE DATE:  08/13/2019            INTERPRETATION:  CLINICAL STATEMENT: Shortness of breath and left upper   quadrant abdominal pain. Rule out pulmonary embolus    TECHNIQUE: Multislice helical sections were obtained from the thoracic   inlet to the lung bases during rapid administration of 100cc Optiray 320   intravenous contrast using a CTA protocol. Subsequently, axial CT   sections were obtained from the domes of the diaphragms to the pubic   symphysis. Thin sections were reconstructed through the pulmonary   vasculature. Sagittal and coronal reformatted images were acquired, as   well as MIP reconstructed images.    COMPARISON CT: CTA chest 5/24/2019. CT dissection dated 11/16/2018.      FINDINGS:    CHEST:     PULMONARY EMBOLUS: No central or segmental pulmonary embolus.    LUNGS/PLEURA/AIRWAYS: Central airways are patent. Upper lobe predominant   emphysema and biapical scarring. Interstitial pulmonary edema bilateral   pleural effusions with adjacent atelectasis. No pneumothorax.    MEDIASTINUM/THORACIC NODES: Stable prominent mediastinal lymph nodes   measuring up to 1.2 cm in the left para-aortic region (series 3/183).     HEART/GREAT VESSELS: Stable cardiomegaly and trace pericardial effusion.   Mild pulmonary artery dilatation measuring up to 3.2 cm. Coronary and   aortic vascular calcifications. Mitral annular effusions.      ABDOMEN/PELVIS:    HEPATOBILIARY: Postcholecystectomy.    SPLEEN: Unremarkable.    PANCREAS: Unremarkable.    ADRENAL GLANDS: Unremarkable.    KIDNEYS: Right-sided duplicated collecting systems. Symmetric enhancement   bilaterally. No hydronephrosis. Bilateral cysts and additional   subcentimeter hypodensities to small to characterize.    ABDOMINOPELVIC NODES: Unremarkable.    PELVIC ORGANS: Unremarkable.    PERITONEUM/MESENTERY/BOWEL: No evidence of bowel obstruction,   pneumoperitoneum or ascites. Appendix is unremarkable. Diverticulosis   without evidence of diverticulitis. Small hiatal hernia.    BONES/SOFT TISSUES: Degenerative changes of the spine. No acute osseous   abnormality.    OTHER: Atherosclerotic vascular calcifications. Stable small infrarenal   aortic aneurysm measuring 1.8 cm, measuring at the same level.    IMPRESSION:    1. No pulmonary embolus.    2. Interstitial pattern edema with small bilateral pleural effusions.    3. Mild mediastinal lymphadenopathy, likely reactive.    4. No CT evidence of acute abdominopelvic pathology.      KONSTANTIN GENAO M.D., RESIDENT RADIOLOGIST  This document has been electronically signed.  YOSI GORDILLO M.D., ATTENDING RADIOLOGIST  This document has been electronically signed. Aug 13 2019  8:48PM          EXAM:  US ABDOMEN LIMITED            PROCEDURE DATE:  08/13/2019            INTERPRETATION:  CLINICAL HISTORY: Right upper quadrant pain.    COMPARISON: Ultrasound of the abdomen dated 11/17/2018.    PROCEDURE: Ultrasound of the right upper quadrant was performed.    FINDINGS:    LIVER: Heterogeneous echotexture, measuring 14.6 cm in length.    GALLBLADDER/BILIARY TREE: Status post cholecystectomy. No intrahepatic   biliary ductal dilatation. The common bile duct measures 8 mm, mildly   dilated likely due to post cystectomy.    PANCREAS:  Visualized head and body are unremarkable. Remainder obscured   by overlying bowel gas.    KIDNEY:  Right kidney measures 9.4 cm in length and contains an   interpolar cyst measuring 1.3 x 1.2 x 1.0 cm. No hydronephrosis or   calculi.    AORTA/IVC:  Visualized proximal portions unremarkable.    ASCITES:  None.    IMPRESSION:    Heterogeneously echogenic liver, consistent with hepatocellular disease.    Status post cholecystectomy.      SHILPA SAGASTUME M.D., RESIDENT RADIOLOGIST  This document has been electronically signed.  YOSI GORDILLO M.D., ATTENDING RADIOLOGIST  This document has been electronically signed. Aug 13 2019  8:35PM      Drug Screen:        [ ]  [place]  Reviewed on 8/15/19, 3:15P  Patient Name:	Latanya Henderson	YOB: 1945  Address:	[address] [place] [address]	Sex:	Female  Rx Written	Rx Dispensed	Drug	Quantity	Days Supply	Prescriber Name  06/28/2019	07/14/2019	lorazepam 0.5 mg tablet	60	30	Estefani Javier FNP  06/13/2019	06/14/2019	lorazepam 0.5 mg tablet	60	30	Estefani Javier FNP  04/28/2019	05/06/2019	lorazepam 0.5 mg tablet	60	30	Nellie, Detty  03/31/2019	04/06/2019	lorazepam 0.5 mg tablet	60	30	Nellie, Detty  03/03/2019	03/07/2019	lorazepam 0.5 mg tablet	60	30	Nellie, Detty  02/03/2019	02/05/2019	lorazepam 0.5 mg tablet	60	30	Nellie, Detty  12/09/2018	01/06/2019	lorazepam 0.5 mg tablet	60	30	Nellie, Detty  11/11/2018	12/07/2018	lorazepam 0.5 mg tablet	60	30	Nellie, Detty  10/14/2018	10/22/2018	lorazepam 0.5 mg tablet	60	30	Nellie, Detty  09/19/2018	09/23/2018	lorazepam 0.5 mg tablet	60	30	Olivia Ruiz MD  08/05/2018	08/23/2018	lorazepam 0.5 mg tablet	60	30	Nellie, Ynesty

## 2019-08-15 NOTE — CONSULT NOTE ADULT - ASSESSMENT
IMPRESSION: Rehab of 85 y/o  f  rehab for  debility  gd      PRECAUTIONS: [  ] Cardiac  [ x ] Respiratory  [  ] Seizures [  ] Contact Isolation  [  ] Droplet Isolation  [ FALL ] Other    Weight Bearing Status:     RECOMMENDATION:    Out of Bed to Chair     DVT/Decubiti Prophylaxis    REHAB PLAN:     [ xxx  ] Bedside P/T 3-5 times a week   [   ]   Bedside O/T  2-3 times a week             [   ] No Rehab Therapy Indicated                   [   ]  Speech Therapy   Conditioning/ROM                                    ADL  Bed Mobility                                               Conditioning/ROM  Transfers                                                     Bed Mobility  Sitting /Standing Balance                         Transfers                                        Gait Training                                               Sitting/Standing Balance  Stair Training [   ]Applicable                    Home equipment Eval                                                                        Splinting  [   ] Only      GOALS:   ADL   [  x ]   Independent                    Transfers  [ x ] Independent                          Ambulation  [x   ] Independent     [   x ] With device                            [   x]  CG                                                         [x   ]  CG                                                                  [x   ] CG                            [    ] Min A                                                   [   ] Min A                                                              [   ] Min  A          DISCHARGE PLAN:   [   ]  Good candidate for Intensive Rehabilitation/Hospital based-4A [facility]                                             Will tolerate 3hrs Intensive Rehab Daily                                       [  xx  ]  Short Term Rehab in Skilled Nursing Facility                                       [    ]  Home with Outpatient or  services                                         [    ]  Possible Candidate for Intensive Hospital based Rehab
Patient on home. Went to PMD without o2. Sat was low. No chest painlasix dose at home higher in past. Told past do dobutamine SE . She di not do. Volume overload. Check previous echo. Daily weight. Told use O2 all day and night. Lasix 40 mg qd for now. Check lytes. Correct k
Impression:  SOB   CHF exacerbation fluid over laod   FLAVIO/ [facility]         Plan:  agree with IV lasix keep IS < OS   daily weight   continue symbicort 80 Q 12 hrs   follow cardiology   cxr nehemiah   possible d/c in 24 hrs   pending sleep study as outpatient
REVIEW OF SYSTEMS    General:	NAD   Skin/Breast:	Neg  Ophthalmologic:	Neg  ENMT: Neg	  Respiratory and Thorax: Neg	  Cardiovascular:	Neg  Gastrointestinal:	Neg  Genitourinary:	Neg  Musculoskeletal:	Lower back pain   Neurological:	Neg  Psychiatric:	Neg  Hematology/Lymphatics:	Neg  Endocrine:	Neg        PHYSICAL EXAM:    GENERAL: NAD, well-groomed, well-developed  HEAD:  Atraumatic, Normocephalic  EYES: EOMI, PERRLA, conjunctiva and sclera clear  ENMT: No tonsillar erythema, exudates, or enlargement; Moist mucous membranes, Good dentition, No lesions  NECK: Supple, No JVD, Normal thyroid  NERVOUS SYSTEM:  Alert & Oriented X3, Good concentration; Motor Strength 5/5 B/L upper and lower extremities  CHEST/LUNG: Bilat chest expansion, Neg SOB  HEART: Regular rate and rhythm; No murmurs, rubs, or gallops  ABDOMEN: Soft, Nontender, Nondistended, having BM's  EXTREMITIES: No clubbing, cyanosis, or edema  LYMPH: No lymphadenopathy noted  SKIN: No rashes or lesions      Patient sitting on the bed with both feet to the floor. Patient with pain to the right hip and the right ant. thigh. Patient states Dr. Vigil gave her a shot to her right hip 2 weeks ago and has been since then, until the pain started a couple of days ago. Positive movement and sensation present. Patient is ambulatory. Patient states pain is 10/10 before pain meds and  6/10 after pain meds. Pain is  6/10 now. Pain is described as sharp, constant, non-radiating. Patient states that when she laid on her left side yesterday for an x-ray, her right side pain to the hip became very severe.

## 2019-08-16 PROCEDURE — 99233 SBSQ HOSP IP/OBS HIGH 50: CPT

## 2019-08-16 PROCEDURE — 72100 X-RAY EXAM L-S SPINE 2/3 VWS: CPT | Mod: 26

## 2019-08-16 PROCEDURE — 73502 X-RAY EXAM HIP UNI 2-3 VIEWS: CPT | Mod: 26,RT

## 2019-08-16 RX ORDER — GABAPENTIN 400 MG/1
300 CAPSULE ORAL THREE TIMES A DAY
Refills: 0 | Status: DISCONTINUED | OUTPATIENT
Start: 2019-08-16 | End: 2019-08-17

## 2019-08-16 RX ORDER — TRAMADOL HYDROCHLORIDE 50 MG/1
50 TABLET ORAL EVERY 6 HOURS
Refills: 0 | Status: DISCONTINUED | OUTPATIENT
Start: 2019-08-16 | End: 2019-08-17

## 2019-08-16 RX ORDER — IPRATROPIUM/ALBUTEROL SULFATE 18-103MCG
3 AEROSOL WITH ADAPTER (GRAM) INHALATION EVERY 6 HOURS
Refills: 0 | Status: DISCONTINUED | OUTPATIENT
Start: 2019-08-16 | End: 2019-08-17

## 2019-08-16 RX ORDER — LIDOCAINE 4 G/100G
2 CREAM TOPICAL DAILY
Refills: 0 | Status: DISCONTINUED | OUTPATIENT
Start: 2019-08-16 | End: 2019-08-17

## 2019-08-16 RX ADMIN — TRAMADOL HYDROCHLORIDE 50 MILLIGRAM(S): 50 TABLET ORAL at 12:54

## 2019-08-16 RX ADMIN — Medication 100 MILLIGRAM(S): at 15:57

## 2019-08-16 RX ADMIN — HEPARIN SODIUM 5000 UNIT(S): 5000 INJECTION INTRAVENOUS; SUBCUTANEOUS at 05:21

## 2019-08-16 RX ADMIN — Medication 0.5 MILLIGRAM(S): at 21:16

## 2019-08-16 RX ADMIN — HEPARIN SODIUM 5000 UNIT(S): 5000 INJECTION INTRAVENOUS; SUBCUTANEOUS at 15:58

## 2019-08-16 RX ADMIN — GABAPENTIN 100 MILLIGRAM(S): 400 CAPSULE ORAL at 15:57

## 2019-08-16 RX ADMIN — Medication 650 MILLIGRAM(S): at 11:16

## 2019-08-16 RX ADMIN — Medication 30 MILLILITER(S): at 06:46

## 2019-08-16 RX ADMIN — Medication 1 APPLICATION(S): at 15:58

## 2019-08-16 RX ADMIN — Medication 1 APPLICATION(S): at 21:14

## 2019-08-16 RX ADMIN — GABAPENTIN 300 MILLIGRAM(S): 400 CAPSULE ORAL at 21:09

## 2019-08-16 RX ADMIN — Medication 100 MILLIGRAM(S): at 21:09

## 2019-08-16 RX ADMIN — GABAPENTIN 100 MILLIGRAM(S): 400 CAPSULE ORAL at 05:20

## 2019-08-16 RX ADMIN — HEPARIN SODIUM 5000 UNIT(S): 5000 INJECTION INTRAVENOUS; SUBCUTANEOUS at 21:11

## 2019-08-16 RX ADMIN — Medication 650 MILLIGRAM(S): at 18:22

## 2019-08-16 RX ADMIN — Medication 1 SPRAY(S): at 05:21

## 2019-08-16 RX ADMIN — ATORVASTATIN CALCIUM 20 MILLIGRAM(S): 80 TABLET, FILM COATED ORAL at 21:09

## 2019-08-16 RX ADMIN — BUDESONIDE AND FORMOTEROL FUMARATE DIHYDRATE 2 PUFF(S): 160; 4.5 AEROSOL RESPIRATORY (INHALATION) at 21:13

## 2019-08-16 RX ADMIN — Medication 100 MILLIGRAM(S): at 05:21

## 2019-08-16 RX ADMIN — TRAMADOL HYDROCHLORIDE 50 MILLIGRAM(S): 50 TABLET ORAL at 05:22

## 2019-08-16 RX ADMIN — AMLODIPINE BESYLATE 2.5 MILLIGRAM(S): 2.5 TABLET ORAL at 06:45

## 2019-08-16 RX ADMIN — Medication 3 MILLILITER(S): at 20:06

## 2019-08-16 RX ADMIN — LIDOCAINE 2 PATCH: 4 CREAM TOPICAL at 18:23

## 2019-08-16 RX ADMIN — Medication 3 MILLILITER(S): at 16:58

## 2019-08-16 RX ADMIN — ESCITALOPRAM OXALATE 10 MILLIGRAM(S): 10 TABLET, FILM COATED ORAL at 11:16

## 2019-08-16 RX ADMIN — Medication 650 MILLIGRAM(S): at 19:14

## 2019-08-16 RX ADMIN — LIDOCAINE 2 PATCH: 4 CREAM TOPICAL at 20:19

## 2019-08-16 RX ADMIN — TRAMADOL HYDROCHLORIDE 50 MILLIGRAM(S): 50 TABLET ORAL at 05:19

## 2019-08-16 RX ADMIN — Medication 650 MILLIGRAM(S): at 12:15

## 2019-08-16 RX ADMIN — Medication 1 APPLICATION(S): at 05:21

## 2019-08-16 RX ADMIN — Medication 1 SPRAY(S): at 18:21

## 2019-08-16 RX ADMIN — TRAMADOL HYDROCHLORIDE 50 MILLIGRAM(S): 50 TABLET ORAL at 18:22

## 2019-08-16 RX ADMIN — FAMOTIDINE 40 MILLIGRAM(S): 10 INJECTION INTRAVENOUS at 21:10

## 2019-08-16 RX ADMIN — Medication 0.5 MILLIGRAM(S): at 11:14

## 2019-08-16 RX ADMIN — Medication 81 MILLIGRAM(S): at 11:16

## 2019-08-16 RX ADMIN — TRAMADOL HYDROCHLORIDE 50 MILLIGRAM(S): 50 TABLET ORAL at 13:50

## 2019-08-16 RX ADMIN — BUDESONIDE AND FORMOTEROL FUMARATE DIHYDRATE 2 PUFF(S): 160; 4.5 AEROSOL RESPIRATORY (INHALATION) at 08:49

## 2019-08-16 RX ADMIN — Medication 40 MILLIGRAM(S): at 05:21

## 2019-08-16 RX ADMIN — MONTELUKAST 10 MILLIGRAM(S): 4 TABLET, CHEWABLE ORAL at 11:17

## 2019-08-16 NOTE — PROGRESS NOTE ADULT - ASSESSMENT
74F h/o COPD on home O2, HTN, Diastolic CHF presented with SOB & LUQ pain. Since her discharge in 6/2019 she had been chronically dyspneic with difficulty ambulating but worsened dyspnea acutely with onset of LUQ sa day PTP.  LUQ pain had resolved by the time of presentation with no associated fever, chills, CP, n/v/d, lightheadedness, syncope. She had actually gone in to see her PMD when a low pulse ox reading in the 70s on RA was noted in the office. CT chest done showed pulmonary edema with small bilateral pleural effusions.      Assessment & Plan:    1. Dyspnea due to Volume overload:  Continue PO Lasix. Intake & Output.  ECHO: 8/14/19: LVEF >55%, Diastolic Dysfunction.   Cardiology following: appreciate input. Recommendations noted.  Stress ECHO out Patient.      2. h/o COPD:  No acute exacerbation.  Pulm following: Continue Diuresis & Symbicort.  Supplemental oxygen.  Follow up out patient with Pulm.      3. FLAVIO:  CPAP qhs.  Sleep study out patient.      4. HTN:  BP stable on Lopressor, Lasix and Amlodipine.        5. DLD:  Continue Statin.      6. Depression:  On Citalopram.      7. RT Hip and Lower back pain:  X-ray showed Degenerative changes.  Will need out patient MRI of the LS spine.  Continue gabapentin and Tylenol ATC.  On Gabapentin.  Follow up with Dr. Vigil out patient.      Prophylaxis: Heparin  Code status: Full code    Progress Note Handoff:  Pending consults: None  Pending Tests: None  Significant Test results: CT chest  Family/Patient discussion: Plan of care discussed with patient.  Disposition: Home with Home care in the AM.      Attending: Dr. Vernell Perez. Spectra 1503. 74F h/o COPD on home O2, HTN, Diastolic CHF presented with SOB & LUQ pain. Since her discharge in 6/2019 she had been chronically dyspneic with difficulty ambulating but worsened dyspnea acutely with onset of LUQ sa day PTP.  LUQ pain had resolved by the time of presentation with no associated fever, chills, CP, n/v/d, lightheadedness, syncope. She had actually gone in to see her PMD when a low pulse ox reading in the 70s on RA was noted in the office. CT chest done showed pulmonary edema with small bilateral pleural effusions.    Patient has been hospitalised multiple times and has been compliant with CPAP whilst in Patient. She is being discharged home to follow up with Pulmonologist for a repeat sleep study but will still require the use of CPAP at night as failure to so will be detrimental to her health.       Assessment & Plan:    1. Dyspnea due to Volume overload:  Continue PO Lasix. Intake & Output.  ECHO: 8/14/19: LVEF >55%, Diastolic Dysfunction.   Cardiology following: appreciate input. Recommendations noted.  Stress ECHO out Patient.      2. h/o COPD:  No acute exacerbation.  Pulm following: Continue Diuresis & Symbicort.  Supplemental oxygen.  Follow up out patient with Pulm.      3. FLAVIO:  CPAP qhs.  Sleep study out patient.      4. HTN:  BP stable on Lopressor, Lasix and Amlodipine.        5. DLD:  Continue Statin.      6. Depression:  On Citalopram.      7. RT Hip and Lower back pain:  X-ray showed Degenerative changes.  Will need out patient MRI of the LS spine.  Continue gabapentin and Tylenol ATC.  On Gabapentin.  Follow up with Dr. Vigil out patient.      Prophylaxis: Heparin  Code status: Full code    Progress Note Handoff:  Pending consults: None  Pending Tests: None  Significant Test results: CT chest  Family/Patient discussion: Plan of care discussed with patient.  Disposition: Home with Home care in the AM.      Attending: Dr. Vernell Perez. Spectra 1503.

## 2019-08-17 ENCOUNTER — TRANSCRIPTION ENCOUNTER (OUTPATIENT)
Age: 74
End: 2019-08-17

## 2019-08-17 VITALS
WEIGHT: 203.71 LBS | DIASTOLIC BLOOD PRESSURE: 66 MMHG | RESPIRATION RATE: 16 BRPM | HEART RATE: 81 BPM | SYSTOLIC BLOOD PRESSURE: 142 MMHG | TEMPERATURE: 97 F

## 2019-08-17 PROCEDURE — 99239 HOSP IP/OBS DSCHRG MGMT >30: CPT

## 2019-08-17 RX ORDER — DOCUSATE SODIUM 100 MG
1 CAPSULE ORAL
Qty: 0 | Refills: 0 | DISCHARGE
Start: 2019-08-17

## 2019-08-17 RX ORDER — FUROSEMIDE 40 MG
1 TABLET ORAL
Qty: 0 | Refills: 0 | DISCHARGE

## 2019-08-17 RX ORDER — FUROSEMIDE 40 MG
1 TABLET ORAL
Qty: 30 | Refills: 0
Start: 2019-08-17 | End: 2019-09-15

## 2019-08-17 RX ORDER — ACETAMINOPHEN 500 MG
2 TABLET ORAL
Qty: 0 | Refills: 0 | DISCHARGE
Start: 2019-08-17

## 2019-08-17 RX ORDER — ACETAMINOPHEN 500 MG
1 TABLET ORAL
Qty: 0 | Refills: 0 | DISCHARGE
Start: 2019-08-17

## 2019-08-17 RX ORDER — GABAPENTIN 400 MG/1
1 CAPSULE ORAL
Qty: 15 | Refills: 0
Start: 2019-08-17 | End: 2019-08-21

## 2019-08-17 RX ORDER — TRAMADOL HYDROCHLORIDE 50 MG/1
1 TABLET ORAL
Qty: 20 | Refills: 0
Start: 2019-08-17 | End: 2019-08-21

## 2019-08-17 RX ORDER — GABAPENTIN 400 MG/1
1 CAPSULE ORAL
Qty: 0 | Refills: 0 | DISCHARGE

## 2019-08-17 RX ORDER — FUROSEMIDE 40 MG
40 TABLET ORAL ONCE
Refills: 0 | Status: DISCONTINUED | OUTPATIENT
Start: 2019-08-17 | End: 2019-08-17

## 2019-08-17 RX ORDER — IPRATROPIUM/ALBUTEROL SULFATE 18-103MCG
3 AEROSOL WITH ADAPTER (GRAM) INHALATION
Qty: 0 | Refills: 0 | DISCHARGE
Start: 2019-08-17

## 2019-08-17 RX ADMIN — Medication 100 MILLIGRAM(S): at 05:43

## 2019-08-17 RX ADMIN — Medication 1 SPRAY(S): at 05:45

## 2019-08-17 RX ADMIN — GABAPENTIN 300 MILLIGRAM(S): 400 CAPSULE ORAL at 05:44

## 2019-08-17 RX ADMIN — AMLODIPINE BESYLATE 2.5 MILLIGRAM(S): 2.5 TABLET ORAL at 05:43

## 2019-08-17 RX ADMIN — Medication 1 APPLICATION(S): at 05:46

## 2019-08-17 RX ADMIN — BUDESONIDE AND FORMOTEROL FUMARATE DIHYDRATE 2 PUFF(S): 160; 4.5 AEROSOL RESPIRATORY (INHALATION) at 07:50

## 2019-08-17 RX ADMIN — Medication 100 MILLIGRAM(S): at 05:45

## 2019-08-17 RX ADMIN — Medication 650 MILLIGRAM(S): at 05:42

## 2019-08-17 RX ADMIN — Medication 3 MILLILITER(S): at 07:28

## 2019-08-17 RX ADMIN — TRAMADOL HYDROCHLORIDE 50 MILLIGRAM(S): 50 TABLET ORAL at 07:49

## 2019-08-17 RX ADMIN — HEPARIN SODIUM 5000 UNIT(S): 5000 INJECTION INTRAVENOUS; SUBCUTANEOUS at 05:44

## 2019-08-17 NOTE — DISCHARGE NOTE PROVIDER - CARE PROVIDERS DIRECT ADDRESSES
,DirectAddress_Unknown,DirectAddress_Unknown,[email or web address]

## 2019-08-17 NOTE — PROGRESS NOTE ADULT - ASSESSMENT
74F h/o COPD on home O2, HTN, Diastolic CHF presented with SOB & LUQ pain. Since her discharge in 6/2019 she had been chronically dyspneic with difficulty ambulating but worsened dyspnea acutely with onset of LUQ sa day PTP.  LUQ pain had resolved by the time of presentation with no associated fever, chills, CP, n/v/d, lightheadedness, syncope. She had actually gone in to see her PMD when a low pulse ox reading in the 70s on RA was noted in the office. CT chest done showed pulmonary edema with small bilateral pleural effusions.    Patient has been hospitalised multiple times and has been compliant with CPAP whilst in Patient. She is being discharged home to follow up with Pulmonologist for a repeat sleep study but will still require the use of CPAP at night as failure to so will be detrimental to her health.       Assessment & Plan:    1. Dyspnea due to Volume overload: Improved.  Continue PO Lasix. Intake & Output.  ECHO: 8/14/19: LVEF >55%, Diastolic Dysfunction.   Cardiology following: appreciate input. Recommendations noted.  Stress ECHO out Patient.      2. h/o COPD:  No acute exacerbation.  Pulm following: Continue Diuresis & Symbicort.  Supplemental oxygen.  Follow up out patient with Pulm.      3. FLAVIO:  CPAP qhs.  Sleep study out patient.      4. HTN:  BP stable on Lopressor, Lasix and Amlodipine.        5. DLD:  Continue Statin.      6. Depression:  On Citalopram.      7. RT Hip and Lower back pain:  X-ray showed Degenerative changes.  Will need out patient MRI of the LS spine.  Continue gabapentin and Tylenol ATC. Refused Lidocaine patches.  On Gabapentin.  Follow up with Dr. Vigil out patient.      Prophylaxis: Heparin  Code status: Full code    Progress Note Handoff:  Pending consults: None  Pending Tests: None  Significant Test results: CT chest  Family/Patient discussion: Plan of care discussed with patient.  Disposition: Home with Home care today.      Attending: Dr. Vernell Perez. Spectra 1503.

## 2019-08-17 NOTE — PROGRESS NOTE ADULT - REASON FOR ADMISSION
Acute on Chronic diastolic CHF
Dyspnea
suspect CHF

## 2019-08-17 NOTE — DISCHARGE NOTE PROVIDER - NSDCCPCAREPLAN_GEN_ALL_CORE_FT
PRINCIPAL DISCHARGE DIAGNOSIS  Diagnosis: CHF (congestive heart failure)  Assessment and Plan of Treatment: Continue Lasix as prescribed.   Follow up with Dr. Bishop within 1 week after discharge.      SECONDARY DISCHARGE DIAGNOSES  Diagnosis: Right hip pain  Assessment and Plan of Treatment: Follow up with Dr. Vigil as scheduled. Continue pain medications    Diagnosis: FLAVIO on CPAP  Assessment and Plan of Treatment: Follow up with Dr. Chavez for a sleep study.    Diagnosis: History of COPD  Assessment and Plan of Treatment: Please use Oxygen continuously. Use Inhalers as recommended. Follow up with Dr. Chavez after discharge.

## 2019-08-17 NOTE — DISCHARGE NOTE PROVIDER - CARE PROVIDER_API CALL
Juan Vigil)  Anesthesiology; Pain Medicine  [address] [place] [address]  Phone: [phone]  Fax: [phone]  Follow Up Time:     Hunter Chavez)  Internal Medicine; Pulmonary Disease  [address] [place] [address], [facility] [address]  [place] [address]  Phone: [phone]  Fax: [phone]  Follow Up Time:     Carl Bishop)  Cardiovascular Disease; Internal Medicine  [address] [place] [id number]  Phone: [phone]  Fax: [phone]  Follow Up Time:

## 2019-08-17 NOTE — PROGRESS NOTE ADULT - SUBJECTIVE AND OBJECTIVE BOX
SHAUN COATES  74y  Female    Patient is a 74y old  Female who presents with a chief complaint of worsening dyspnea (14 Aug 2019 09:54)      INTERVAL HPI/OVERNIGHT EVENTS:  No interval events.  Dyspnea improved.   She has no new complaints.      REVIEW OF SYSTEMS:  CONSTITUTIONAL: No fever, weight loss, or fatigue  EYES: No eye pain, visual disturbances, or discharge  ENMT:  No difficulty hearing, tinnitus, vertigo; No sinus or throat pain  NECK: No pain or stiffness  BREASTS: No pain, masses, or nipple discharge  RESPIRATORY: No cough, wheezing, chills or hemoptysis; No shortness of breath  CARDIOVASCULAR: No chest pain, palpitations, dizziness, or leg swelling  GASTROINTESTINAL: No abdominal or epigastric pain. No nausea, vomiting, or hematemesis; No diarrhea or constipation. No melena or hematochezia.  GENITOURINARY: No dysuria, frequency, hematuria, or incontinence  NEUROLOGICAL: No headaches, memory loss, loss of strength, numbness, or tremors  SKIN: No itching, burning, rashes, or lesions   LYMPH NODES: No enlarged glands  ENDOCRINE: No heat or cold intolerance; No hair loss  MUSCULOSKELETAL: Bilateral Knee pain.   PSYCHIATRIC: No depression, anxiety, mood swings, or difficulty sleeping  HEME/LYMPH: No easy bruising, or bleeding gums  ALLERGY AND IMMUNOLOGIC: No hives or eczema      VITALS:  T(C): 36 (15 Aug 2019 05:00), Max: 36.2 (14 Aug 2019 21:05)  T(F): 96.8 (15 Aug 2019 05:00), Max: 97.2 (14 Aug 2019 21:05)  HR: 65 (15 Aug 2019 05:00) (62 - 70)  BP: 131/63 (15 Aug 2019 05:00) (127/62 - 160/66)  BP(mean): --  RR: 16 (15 Aug 2019 05:00) (16 - 18)  SpO2: 99% (15 Aug 2019 08:41) (99% - 99%)      I&O's Summary    14 Aug 2019 07:01  -  15 Aug 2019 07:00  --------------------------------------------------------  IN: 460 mL / OUT: 3150 mL / NET: -2690 mL    15 Aug 2019 07:01  -  15 Aug 2019 11:15  --------------------------------------------------------  IN: 360 mL / OUT: 300 mL / NET: 60 mL      PHYSICAL EXAM:  GENERAL: NAD, obese  HEAD:  Atraumatic, Normocephalic  EYES: conjunctiva and sclera clear  ENMT: Moist mucous membranes  NECK: Supple, Normal thyroid  NERVOUS SYSTEM:  Alert & Oriented X 3, Good concentration; Motor Strength 5/5 B/L upper and lower extremities  CHEST/LUNG: Clear to auscultation bilaterally; No rales, rhonchi, wheezing, or rubs  HEART: Regular rate and rhythm; No murmurs, rubs, or gallops  ABDOMEN: Soft, Nontender, Nondistended; Bowel sounds present  EXTREMITIES:  2+ Peripheral Pulses, No clubbing, cyanosis, or edema  LYMPH: No lymphadenopathy noted  SKIN: No rashes or lesions    Consultant(s) Notes Reviewed:  [x ] YES  [ ] NO  Care Discussed with Consultants/Other Providers [ x] YES  [ ] NO    LABS:                        12.8   5.96  )-----------( 217      ( 14 Aug 2019 06:06 )             39.9       08-15    144  |  103  |  27<H>  ----------------------------<  88  4.0   |  28  |  0.9    Ca    9.9      15 Aug 2019 06:49  Phos  4.7     08-13  Mg     2.0     08-13    TPro  7.1  /  Alb  4.4  /  TBili  0.5  /  DBili  <0.2  /  AST  36  /  ALT  45<H>  /  AlkPhos  147<H>  08-15      CARDIAC MARKERS ( 14 Aug 2019 06:06 )  x     / <0.01 ng/mL / 39 U/L / x     / 3.1 ng/mL  CARDIAC MARKERS ( 13 Aug 2019 17:24 )  x     / <0.01 ng/mL / x     / x     / x          MICROBIOLOGY: None      RADIOLOGY & ADDITIONAL TESTS:  CT Angio Chest w/ IV Cont (08.13.19 @ 19:54)   1. No pulmonary embolus.    2. Interstitial pattern edema with small bilateral pleural effusions.    3. Mild mediastinal lymphadenopathy, likely reactive.    4. No CT evidence of acute abdominopelvic pathology.        Imaging Personally Reviewed:  [x] YES  [ ] NO    MEDICATIONS  (STANDING):  amLODIPine   Tablet 2.5 milliGRAM(s) Oral daily  aspirin  chewable 81 milliGRAM(s) Oral daily  atorvastatin 20 milliGRAM(s) Oral at bedtime  buDESOnide  80 MICROgram(s)/formoterol 4.5 MICROgram(s) Inhaler 2 Puff(s) Inhalation two times a day  docusate sodium 100 milliGRAM(s) Oral three times a day  escitalopram 10 milliGRAM(s) Oral daily  famotidine    Tablet 40 milliGRAM(s) Oral at bedtime  fluticasone propionate 50 MICROgram(s)/spray Nasal Spray 1 Spray(s) Both Nostrils two times a day  gabapentin 100 milliGRAM(s) Oral three times a day  heparin  Injectable 5000 Unit(s) SubCutaneous every 8 hours  metoprolol tartrate 100 milliGRAM(s) Oral two times a day  montelukast 10 milliGRAM(s) Oral daily  silver sulfADIAZINE 1% Cream 1 Application(s) Topical three times a day      MEDICATIONS  (PRN):  acetaminophen   Tablet .. 650 milliGRAM(s) Oral every 6 hours PRN Mild Pain (1 - 3)  LORazepam     Tablet 0.5 milliGRAM(s) Oral two times a day PRN Anxiety        Home Medications:  acetaminophen 325 mg oral tablet: 2 tab(s) orally every 6 hours, As needed, Temp greater or equal to 38C (100.4F), Moderate Pain (4 - 6) (13 Aug 2019 23:36)  amLODIPine 2.5 mg oral tablet: 1 tab(s) orally once a day (13 Aug 2019 23:36)  aspirin 81 mg oral tablet: 1 tab(s) orally once a day (13 Aug 2019 23:36)  atorvastatin 20 mg oral tablet: 1 tab(s) orally once a day (13 Aug 2019 23:36)  escitalopram 10 mg oral tablet: 1 tab(s) orally once a day (13 Aug 2019 23:36)  famotidine 40 mg oral tablet: 1 tab(s) orally once a day (at bedtime) (13 Aug 2019 23:36)  Flonase 50 mcg/inh nasal spray: 1 spray(s) nasal once a day (13 Aug 2019 23:36)  furosemide 20 mg oral tablet: 1 tab(s) orally once a day (13 Aug 2019 23:36)  gabapentin 100 mg oral capsule: 1 cap(s) orally 3 times a day (13 Aug 2019 23:36)  LORazepam 0.5 mg oral tablet: 1 tab(s) orally 2 times a day, As Needed for anxiety (13 Aug 2019 23:36)  Metoprolol Tartrate 100 mg oral tablet: 1 tab(s) orally 2 times a day (13 Aug 2019 23:36)  montelukast 10 mg oral tablet: 1 tab(s) orally once a day (13 Aug 2019 23:36)  Symbicort 80 mcg-4.5 mcg/inh inhalation aerosol: 2 puff(s) inhaled 2 times a day (13 Aug 2019 23:36)      HEALTH ISSUES - PROBLEM Dx:  Acute on chronic systolic congestive heart failure  Bipolar 1 disorder  PVD (peripheral vascular disease)  Other emphysema  Other cardiomyopathy  TIA (transient ischemic attack)  COPD (chronic obstructive pulmonary disease)  Depression  Hypertension  History of cholecystectomy
SHAUN COATES  74y  Female    Patient is a 74y old Female who presents with a chief complaint of worsening dyspnea (14 Aug 2019 09:54)      INTERVAL HPI/OVERNIGHT EVENTS:  No interval events.  Dyspnea improved.   Patient complaining of lower back & Rt hip pain with difficulty ambulating.  No sensory changes.       REVIEW OF SYSTEMS:  CONSTITUTIONAL: No fever, weight loss, or fatigue  EYES: No eye pain, visual disturbances, or discharge  ENMT:  No difficulty hearing, tinnitus, vertigo; No sinus or throat pain  NECK: No pain or stiffness  BREASTS: No pain, masses, or nipple discharge  RESPIRATORY: No cough, wheezing, chills or hemoptysis; No shortness of breath  CARDIOVASCULAR: No chest pain, palpitations, dizziness, or leg swelling  GASTROINTESTINAL: No abdominal or epigastric pain. No nausea, vomiting, or hematemesis; No diarrhea or constipation. No melena or hematochezia.  GENITOURINARY: No dysuria, frequency, hematuria, or incontinence  NEUROLOGICAL: No headaches, memory loss, loss of strength, numbness, or tremors  SKIN: No itching, burning, rashes, or lesions   LYMPH NODES: No enlarged glands  ENDOCRINE: No heat or cold intolerance; No hair loss  MUSCULOSKELETAL: Rt hip pain, Bilateral Knee pain.   PSYCHIATRIC: No depression, anxiety, mood swings, or difficulty sleeping  HEME/LYMPH: No easy bruising, or bleeding gums  ALLERGY AND IMMUNOLOGIC: No hives or eczema      VITALS:  T(C): 36.6 (16 Aug 2019 05:35), Max: 36.6 (16 Aug 2019 05:35)  T(F): 97.8 (16 Aug 2019 05:35), Max: 97.8 (16 Aug 2019 05:35)  HR: 60 (16 Aug 2019 05:35) (60 - 66)  BP: 135/58 (16 Aug 2019 05:35) (114/57 - 135/58)  BP(mean): --  RR: 16 (16 Aug 2019 05:35) (16 - 16)  SpO2: --      I&O's Summary    15 Aug 2019 07:01  -  16 Aug 2019 07:00  --------------------------------------------------------  IN: 620 mL / OUT: 400 mL / NET: 220 mL    16 Aug 2019 07:01  -  16 Aug 2019 13:39  --------------------------------------------------------  IN: 360 mL / OUT: 200 mL / NET: 160 mL        PHYSICAL EXAM:  GENERAL: NAD, obese  HEAD:  Atraumatic, Normocephalic  EYES: conjunctiva and sclera clear  ENMT: Moist mucous membranes  NECK: Supple, Normal thyroid  NERVOUS SYSTEM:  Alert & Oriented X 3, Good concentration; Motor Strength 5/5 B/L upper and lower extremities  CHEST/LUNG: Clear to auscultation bilaterally; No rales, rhonchi, wheezing, or rubs  HEART: Regular rate and rhythm; No murmurs, rubs, or gallops  ABDOMEN: Soft, Nontender, Nondistended; Bowel sounds present  EXTREMITIES:  2+ Peripheral Pulses, No clubbing, cyanosis, or edema  LYMPH: No lymphadenopathy noted  SKIN: No rashes or lesions    Consultant(s) Notes Reviewed:  [x ] YES  [ ] NO  Care Discussed with Consultants/Other Providers [ x] YES  [ ] NO    LABS:                          12.8   5.96  )-----------( 217      ( 14 Aug 2019 06:06 )             39.9       08-15    144  |  103  |  27<H>  ----------------------------<  88  4.0   |  28  |  0.9    Ca    9.9      15 Aug 2019 06:49  Phos  4.7     08-13  Mg     2.0     08-13    TPro  7.1  /  Alb  4.4  /  TBili  0.5  /  DBili  <0.2  /  AST  36  /  ALT  45<H>  /  AlkPhos  147<H>  08-15      CARDIAC MARKERS ( 14 Aug 2019 06:06 )  x     / <0.01 ng/mL / 39 U/L / x     / 3.1 ng/mL  CARDIAC MARKERS ( 13 Aug 2019 17:24 )  x     / <0.01 ng/mL / x     / x     / x          MICROBIOLOGY: None      RADIOLOGY & ADDITIONAL TESTS:  CT Angio Chest w/ IV Cont (08.13.19 @ 19:54)   1. No pulmonary embolus.    2. Interstitial pattern edema with small bilateral pleural effusions.    3. Mild mediastinal lymphadenopathy, likely reactive.    4. No CT evidence of acute abdominopelvic pathology.      X-ray Hip 2-3 Views, Right (08.16.19 @ 09:04)   1.  No evidence of acute osseous abnormalities.   2.  Mild degenerative changes of right hip.   3.  Visualized soft tissues are unremarkable.      Xray Lumbar Spine AP + Lateral (08.16.19 @ 09:03)   1.  Normal alignment of the spine.  2.  Intact vertebral body heights.  3.  Multilevel narrowing of disc spaces, predominantly involving L3   through S1.  4.  Evaluation of neural foramina are limited secondary to patient's   positioning on oblique views.  5.  Aortic calcification.  6.  Status post cholecystectomy.  7.  Recommend nonemergent MRI of lumbar spine for further evaluation.      Imaging Personally Reviewed:  [x] YES  [ ] NO    MEDICATIONS  (STANDING):  acetaminophen   Tablet .. 650 milliGRAM(s) Oral every 6 hours  amLODIPine   Tablet 2.5 milliGRAM(s) Oral daily  aspirin  chewable 81 milliGRAM(s) Oral daily  atorvastatin 20 milliGRAM(s) Oral at bedtime  buDESOnide  80 MICROgram(s)/formoterol 4.5 MICROgram(s) Inhaler 2 Puff(s) Inhalation two times a day  docusate sodium 100 milliGRAM(s) Oral three times a day  escitalopram 10 milliGRAM(s) Oral daily  famotidine    Tablet 40 milliGRAM(s) Oral at bedtime  fluticasone propionate 50 MICROgram(s)/spray Nasal Spray 1 Spray(s) Both Nostrils two times a day  gabapentin 100 milliGRAM(s) Oral three times a day  heparin  Injectable 5000 Unit(s) SubCutaneous every 8 hours  metoprolol tartrate 100 milliGRAM(s) Oral two times a day  montelukast 10 milliGRAM(s) Oral daily  silver sulfADIAZINE 1% Cream 1 Application(s) Topical three times a day      MEDICATIONS  (PRN):  aluminum hydroxide/magnesium hydroxide/simethicone Suspension 30 milliLiter(s) Oral every 4 hours PRN Dyspepsia  LORazepam     Tablet 0.5 milliGRAM(s) Oral two times a day PRN Anxiety  ondansetron Injectable 4 milliGRAM(s) IV Push every 6 hours PRN Nausea and/or Vomiting  traMADol 50 milliGRAM(s) Oral every 8 hours PRN Severe Pain (7 - 10)          Home Medications:  acetaminophen 325 mg oral tablet: 2 tab(s) orally every 6 hours, As needed, Temp greater or equal to 38C (100.4F), Moderate Pain (4 - 6) (13 Aug 2019 23:36)  amLODIPine 2.5 mg oral tablet: 1 tab(s) orally once a day (13 Aug 2019 23:36)  aspirin 81 mg oral tablet: 1 tab(s) orally once a day (13 Aug 2019 23:36)  atorvastatin 20 mg oral tablet: 1 tab(s) orally once a day (13 Aug 2019 23:36)  escitalopram 10 mg oral tablet: 1 tab(s) orally once a day (13 Aug 2019 23:36)  famotidine 40 mg oral tablet: 1 tab(s) orally once a day (at bedtime) (13 Aug 2019 23:36)  Flonase 50 mcg/inh nasal spray: 1 spray(s) nasal once a day (13 Aug 2019 23:36)  furosemide 20 mg oral tablet: 1 tab(s) orally once a day (13 Aug 2019 23:36)  gabapentin 100 mg oral capsule: 1 cap(s) orally 3 times a day (13 Aug 2019 23:36)  LORazepam 0.5 mg oral tablet: 1 tab(s) orally 2 times a day, As Needed for anxiety (13 Aug 2019 23:36)  Metoprolol Tartrate 100 mg oral tablet: 1 tab(s) orally 2 times a day (13 Aug 2019 23:36)  montelukast 10 mg oral tablet: 1 tab(s) orally once a day (13 Aug 2019 23:36)  Symbicort 80 mcg-4.5 mcg/inh inhalation aerosol: 2 puff(s) inhaled 2 times a day (13 Aug 2019 23:36)      HEALTH ISSUES - PROBLEM Dx:  Acute on chronic systolic congestive heart failure  Bipolar 1 disorder  PVD (peripheral vascular disease)  Other emphysema  Other cardiomyopathy  TIA (transient ischemic attack)  COPD (chronic obstructive pulmonary disease)  Depression  Hypertension  History of cholecystectomy
Patient is a 74y old  Female who presents with a chief complaint of Dyspnea (14 Aug 2019 16:29)      Over Night Events:  Patient seen and examined feel much better     ROS:  See HPI    PHYSICAL EXAM    ICU Vital Signs Last 24 Hrs  T(C): 36 (15 Aug 2019 05:00), Max: 36.2 (14 Aug 2019 21:05)  T(F): 96.8 (15 Aug 2019 05:00), Max: 97.2 (14 Aug 2019 21:05)  HR: 65 (15 Aug 2019 05:00) (62 - 70)  BP: 131/63 (15 Aug 2019 05:00) (127/62 - 160/66)  BP(mean): --  ABP: --  ABP(mean): --  RR: 16 (15 Aug 2019 05:00) (16 - 18)  SpO2: 94% (14 Aug 2019 08:41) (94% - 94%)      General: Aox3  HEENT:    miesha            Lymph Nodes: NO cervical LN   Lungs: Bilateral BS  Cardiovascular: Regular   Abdomen: Soft, Positive BS  Extremities: No clubbing   Skin: warm   Neurological: no focal deficit   Musculoskeletal: move all ext     I&O's Detail    14 Aug 2019 07:01  -  15 Aug 2019 07:00  --------------------------------------------------------  IN:    Oral Fluid: 460 mL  Total IN: 460 mL    OUT:    Voided: 3150 mL  Total OUT: 3150 mL    Total NET: -2690 mL          LABS:                          12.8   5.96  )-----------( 217      ( 14 Aug 2019 06:06 )             39.9         15 Aug 2019 06:49    144    |  103    |  27     ----------------------------<  88     4.0     |  28     |  0.9      Ca    9.9        15 Aug 2019 06:49  Phos  4.7       13 Aug 2019 17:24  Mg     2.0       13 Aug 2019 17:24    TPro  7.1    /  Alb  4.4    /  TBili  0.5    /  DBili  <0.2   /  AST  36     /  ALT  45     /  AlkPhos  147    15 Aug 2019 06:49  Amylase x     lipase x                                                 PT/INR - ( 13 Aug 2019 17:24 )   PT: 12.80 sec;   INR: 1.11 ratio         PTT - ( 13 Aug 2019 17:24 )  PTT:33.4 sec                                             CARDIAC MARKERS ( 14 Aug 2019 06:06 )  x     / <0.01 ng/mL / 39 U/L / x     / 3.1 ng/mL  CARDIAC MARKERS ( 13 Aug 2019 17:24 )  x     / <0.01 ng/mL / x     / x     / x                                                                                                                                                 MEDICATIONS  (STANDING):  amLODIPine   Tablet 2.5 milliGRAM(s) Oral daily  aspirin  chewable 81 milliGRAM(s) Oral daily  atorvastatin 20 milliGRAM(s) Oral at bedtime  buDESOnide  80 MICROgram(s)/formoterol 4.5 MICROgram(s) Inhaler 2 Puff(s) Inhalation two times a day  docusate sodium 100 milliGRAM(s) Oral three times a day  escitalopram 10 milliGRAM(s) Oral daily  famotidine    Tablet 40 milliGRAM(s) Oral at bedtime  fluticasone propionate 50 MICROgram(s)/spray Nasal Spray 1 Spray(s) Both Nostrils two times a day  furosemide   Injectable 40 milliGRAM(s) IV Push two times a day  gabapentin 100 milliGRAM(s) Oral three times a day  heparin  Injectable 5000 Unit(s) SubCutaneous every 8 hours  metoprolol tartrate 100 milliGRAM(s) Oral two times a day  montelukast 10 milliGRAM(s) Oral daily  silver sulfADIAZINE 1% Cream 1 Application(s) Topical three times a day    MEDICATIONS  (PRN):  acetaminophen   Tablet .. 650 milliGRAM(s) Oral every 6 hours PRN Mild Pain (1 - 3)  LORazepam     Tablet 0.5 milliGRAM(s) Oral two times a day PRN Anxiety          Xrays:  TLC:  OG:  ET tube:                                                                                       ECHO:  [facility] ICU:
SHAUN COATES  74y  Female    Patient is a 74y old  Female who presents with a chief complaint of worsening dyspnea (14 Aug 2019 09:54)      INTERVAL HPI/OVERNIGHT EVENTS:  No interval events.  Patient reports improved dyspnea.  She has no new complaints.      REVIEW OF SYSTEMS:  CONSTITUTIONAL: No fever, weight loss, or fatigue  EYES: No eye pain, visual disturbances, or discharge  ENMT:  No difficulty hearing, tinnitus, vertigo; No sinus or throat pain  NECK: No pain or stiffness  BREASTS: No pain, masses, or nipple discharge  RESPIRATORY: No cough, wheezing, chills or hemoptysis; + shortness of breath  CARDIOVASCULAR: No chest pain, palpitations, dizziness, or leg swelling  GASTROINTESTINAL: No abdominal or epigastric pain. No nausea, vomiting, or hematemesis; No diarrhea or constipation. No melena or hematochezia.  GENITOURINARY: No dysuria, frequency, hematuria, or incontinence  NEUROLOGICAL: No headaches, memory loss, loss of strength, numbness, or tremors  SKIN: No itching, burning, rashes, or lesions   LYMPH NODES: No enlarged glands  ENDOCRINE: No heat or cold intolerance; No hair loss  MUSCULOSKELETAL: No joint pain or swelling; No muscle, back, or extremity pain  PSYCHIATRIC: No depression, anxiety, mood swings, or difficulty sleeping  HEME/LYMPH: No easy bruising, or bleeding gums  ALLERGY AND IMMUNOLOGIC: No hives or eczema      VITALS:  T(F): 96.7 (08-14-19 @ 13:41), Max: 97.8 (08-13-19 @ 22:32)  HR: 62 (08-14-19 @ 13:41) (62 - 74)  BP: 152/68 (08-14-19 @ 13:41) (134/63 - 183/87)  RR: 18 (08-14-19 @ 13:41) (18 - 30)  SpO2: 94% (08-14-19 @ 08:41) (90% - 96%)      PHYSICAL EXAM:  GENERAL: NAD,  well-developed  HEAD:  Atraumatic, Normocephalic  EYES: conjunctiva and sclera clear  ENMT: Moist mucous membranes  NECK: Supple, Normal thyroid  NERVOUS SYSTEM:  Alert & Oriented X 3, Good concentration; Motor Strength 5/5 B/L upper and lower extremities  CHEST/LUNG: Clear to auscultation bilaterally; No rales, rhonchi, wheezing, or rubs  HEART: Regular rate and rhythm; No murmurs, rubs, or gallops  ABDOMEN: Soft, Nontender, Nondistended; Bowel sounds present  EXTREMITIES:  2+ Peripheral Pulses, No clubbing, cyanosis, or edema  LYMPH: No lymphadenopathy noted  SKIN: No rashes or lesions    Consultant(s) Notes Reviewed:  [x ] YES  [ ] NO  Care Discussed with Consultants/Other Providers [ x] YES  [ ] NO    LABS:                        12.8   5.96  )-----------( 217      ( 14 Aug 2019 06:06 )             39.9       08-14    142  |  101  |  20  ----------------------------<  94  3.5   |  27  |  1.0    Ca    9.8      14 Aug 2019 06:06  Phos  4.7     08-13  Mg     2.0     08-13    TPro  7.0  /  Alb  4.4  /  TBili  0.9  /  DBili  x   /  AST  60<H>  /  ALT  61<H>  /  AlkPhos  160<H>  08-14      CARDIAC MARKERS ( 14 Aug 2019 06:06 )  x     / <0.01 ng/mL / 39 U/L / x     / 3.1 ng/mL  CARDIAC MARKERS ( 13 Aug 2019 17:24 )  x     / <0.01 ng/mL / x     / x     / x          MICROBIOLOGY: None      RADIOLOGY & ADDITIONAL TESTS:  CT Angio Chest w/ IV Cont (08.13.19 @ 19:54)   1. No pulmonary embolus.    2. Interstitial pattern edema with small bilateral pleural effusions.    3. Mild mediastinal lymphadenopathy, likely reactive.    4. No CT evidence of acute abdominopelvic pathology.        Imaging Personally Reviewed:  [x] YES  [ ] NO    MEDICATIONS  (STANDING):  amLODIPine   Tablet 2.5 milliGRAM(s) Oral daily  aspirin  chewable 81 milliGRAM(s) Oral daily  atorvastatin 20 milliGRAM(s) Oral at bedtime  buDESOnide  80 MICROgram(s)/formoterol 4.5 MICROgram(s) Inhaler 2 Puff(s) Inhalation two times a day  docusate sodium 100 milliGRAM(s) Oral three times a day  escitalopram 10 milliGRAM(s) Oral daily  famotidine    Tablet 40 milliGRAM(s) Oral at bedtime  fluticasone propionate 50 MICROgram(s)/spray Nasal Spray 1 Spray(s) Both Nostrils two times a day  furosemide   Injectable 20 milliGRAM(s) IV Push two times a day  gabapentin 100 milliGRAM(s) Oral three times a day  heparin  Injectable 5000 Unit(s) SubCutaneous every 8 hours  metoprolol tartrate 100 milliGRAM(s) Oral two times a day  montelukast 10 milliGRAM(s) Oral daily  silver sulfADIAZINE 1% Cream 1 Application(s) Topical three times a day      MEDICATIONS  (PRN):  acetaminophen   Tablet .. 650 milliGRAM(s) Oral every 6 hours PRN Mild Pain (1 - 3)  LORazepam     Tablet 0.5 milliGRAM(s) Oral two times a day PRN Anxiety      Home Medications:  acetaminophen 325 mg oral tablet: 2 tab(s) orally every 6 hours, As needed, Temp greater or equal to 38C (100.4F), Moderate Pain (4 - 6) (13 Aug 2019 23:36)  amLODIPine 2.5 mg oral tablet: 1 tab(s) orally once a day (13 Aug 2019 23:36)  aspirin 81 mg oral tablet: 1 tab(s) orally once a day (13 Aug 2019 23:36)  atorvastatin 20 mg oral tablet: 1 tab(s) orally once a day (13 Aug 2019 23:36)  escitalopram 10 mg oral tablet: 1 tab(s) orally once a day (13 Aug 2019 23:36)  famotidine 40 mg oral tablet: 1 tab(s) orally once a day (at bedtime) (13 Aug 2019 23:36)  Flonase 50 mcg/inh nasal spray: 1 spray(s) nasal once a day (13 Aug 2019 23:36)  furosemide 20 mg oral tablet: 1 tab(s) orally once a day (13 Aug 2019 23:36)  gabapentin 100 mg oral capsule: 1 cap(s) orally 3 times a day (13 Aug 2019 23:36)  LORazepam 0.5 mg oral tablet: 1 tab(s) orally 2 times a day, As Needed for anxiety (13 Aug 2019 23:36)  Metoprolol Tartrate 100 mg oral tablet: 1 tab(s) orally 2 times a day (13 Aug 2019 23:36)  montelukast 10 mg oral tablet: 1 tab(s) orally once a day (13 Aug 2019 23:36)  Symbicort 80 mcg-4.5 mcg/inh inhalation aerosol: 2 puff(s) inhaled 2 times a day (13 Aug 2019 23:36)      HEALTH ISSUES - PROBLEM Dx:  Acute on chronic systolic congestive heart failure  Bipolar 1 disorder  PVD (peripheral vascular disease)  Other emphysema  Other cardiomyopathy  TIA (transient ischemic attack)  COPD (chronic obstructive pulmonary disease)  Depression  Hypertension  History of cholecystectomy
SHAUN COATES  74y  Female    Patient is a 74y old Female who presents with a chief complaint of worsening dyspnea (14 Aug 2019 09:54)      INTERVAL HPI/OVERNIGHT EVENTS:  No interval events.  Dyspnea improved.   Patient still complaining of lower back & Rt hip pain with difficulty ambulating.  No sensory changes.       REVIEW OF SYSTEMS:  CONSTITUTIONAL: No fever, weight loss, or fatigue  EYES: No eye pain, visual disturbances, or discharge  ENMT:  No difficulty hearing, tinnitus, vertigo; No sinus or throat pain  NECK: No pain or stiffness  BREASTS: No pain, masses, or nipple discharge  RESPIRATORY: No cough, wheezing, chills or hemoptysis; No shortness of breath  CARDIOVASCULAR: No chest pain, palpitations, dizziness, or leg swelling  GASTROINTESTINAL: No abdominal or epigastric pain. No nausea, vomiting, or hematemesis; No diarrhea or constipation. No melena or hematochezia.  GENITOURINARY: No dysuria, frequency, hematuria, or incontinence  NEUROLOGICAL: No headaches, memory loss, loss of strength, numbness, or tremors  SKIN: No itching, burning, rashes, or lesions   LYMPH NODES: No enlarged glands  ENDOCRINE: No heat or cold intolerance; No hair loss  MUSCULOSKELETAL: Rt hip pain, Bilateral Knee pain.   PSYCHIATRIC: No depression, anxiety, mood swings, or difficulty sleeping  HEME/LYMPH: No easy bruising, or bleeding gums  ALLERGY AND IMMUNOLOGIC: No hives or eczema      VITALS:  T(C): 36.1 (17 Aug 2019 06:00), Max: 36.2 (16 Aug 2019 13:54)  T(F): 97 (17 Aug 2019 06:00), Max: 97.2 (16 Aug 2019 13:54)  HR: 81 (17 Aug 2019 06:00) (66 - 81)  BP: 142/66 (17 Aug 2019 06:00) (113/56 - 142/66)  BP(mean): --  RR: 16 (17 Aug 2019 06:00) (16 - 16)  SpO2: 98% (16 Aug 2019 08:02) (98% - 98%)      I&O's Summary  16 Aug 2019 07:01  -  17 Aug 2019 07:00  --------------------------------------------------------  IN: 600 mL / OUT: 300 mL / NET: 300 mL      PHYSICAL EXAM:  GENERAL: NAD, obese  HEAD:  Atraumatic, Normocephalic  EYES: conjunctiva and sclera clear  ENMT: Moist mucous membranes  NECK: Supple, Normal thyroid  NERVOUS SYSTEM:  Alert & Oriented X 3, Good concentration; Motor Strength 5/5 B/L upper and lower extremities  CHEST/LUNG: Clear to auscultation bilaterally; No rales, rhonchi, wheezing, or rubs  HEART: Regular rate and rhythm; No murmurs, rubs, or gallops  ABDOMEN: Soft, Nontender, Nondistended; Bowel sounds present  EXTREMITIES:  2+ Peripheral Pulses, No clubbing, cyanosis, or edema  LYMPH: No lymphadenopathy noted  SKIN: No rashes or lesions    Consultant(s) Notes Reviewed:  [x ] YES  [ ] NO  Care Discussed with Consultants/Other Providers [ x] YES  [ ] NO    LABS:                          12.8   5.96  )-----------( 217      ( 14 Aug 2019 06:06 )             39.9       08-15    144  |  103  |  27<H>  ----------------------------<  88  4.0   |  28  |  0.9    Ca    9.9      15 Aug 2019 06:49  Phos  4.7     08-13  Mg     2.0     08-13    TPro  7.1  /  Alb  4.4  /  TBili  0.5  /  DBili  <0.2  /  AST  36  /  ALT  45<H>  /  AlkPhos  147<H>  08-15      CARDIAC MARKERS ( 14 Aug 2019 06:06 )  x     / <0.01 ng/mL / 39 U/L / x     / 3.1 ng/mL  CARDIAC MARKERS ( 13 Aug 2019 17:24 )  x     / <0.01 ng/mL / x     / x     / x          MICROBIOLOGY: None      RADIOLOGY & ADDITIONAL TESTS:  CT Angio Chest w/ IV Cont (08.13.19 @ 19:54)   1. No pulmonary embolus.    2. Interstitial pattern edema with small bilateral pleural effusions.    3. Mild mediastinal lymphadenopathy, likely reactive.    4. No CT evidence of acute abdominopelvic pathology.      X-ray Hip 2-3 Views, Right (08.16.19 @ 09:04)   1.  No evidence of acute osseous abnormalities.   2.  Mild degenerative changes of right hip.   3.  Visualized soft tissues are unremarkable.      Xray Lumbar Spine AP + Lateral (08.16.19 @ 09:03)   1.  Normal alignment of the spine.  2.  Intact vertebral body heights.  3.  Multilevel narrowing of disc spaces, predominantly involving L3   through S1.  4.  Evaluation of neural foramina are limited secondary to patient's   positioning on oblique views.  5.  Aortic calcification.  6.  Status post cholecystectomy.  7.  Recommend nonemergent MRI of lumbar spine for further evaluation.      Imaging Personally Reviewed:  [x] YES  [ ] NO    MEDICATIONS  (STANDING):  acetaminophen   Tablet .. 650 milliGRAM(s) Oral every 6 hours  ALBUTerol/ipratropium for Nebulization 3 milliLiter(s) Nebulizer every 6 hours  amLODIPine   Tablet 2.5 milliGRAM(s) Oral daily  aspirin  chewable 81 milliGRAM(s) Oral daily  atorvastatin 20 milliGRAM(s) Oral at bedtime  buDESOnide  80 MICROgram(s)/formoterol 4.5 MICROgram(s) Inhaler 2 Puff(s) Inhalation two times a day  docusate sodium 100 milliGRAM(s) Oral three times a day  escitalopram 10 milliGRAM(s) Oral daily  famotidine    Tablet 40 milliGRAM(s) Oral at bedtime  fluticasone propionate 50 MICROgram(s)/spray Nasal Spray 1 Spray(s) Both Nostrils two times a day  gabapentin 300 milliGRAM(s) Oral three times a day  heparin  Injectable 5000 Unit(s) SubCutaneous every 8 hours  lidocaine   Patch 2 Patch Transdermal daily  metoprolol tartrate 100 milliGRAM(s) Oral two times a day  montelukast 10 milliGRAM(s) Oral daily  silver sulfADIAZINE 1% Cream 1 Application(s) Topical three times a day      MEDICATIONS  (PRN):  aluminum hydroxide/magnesium hydroxide/simethicone Suspension 30 milliLiter(s) Oral every 4 hours PRN Dyspepsia  LORazepam     Tablet 0.5 milliGRAM(s) Oral two times a day PRN Anxiety  ondansetron Injectable 4 milliGRAM(s) IV Push every 6 hours PRN Nausea and/or Vomiting  traMADol 50 milliGRAM(s) Oral every 6 hours PRN Severe Pain (7 - 10)        Home Medications:  acetaminophen 325 mg oral tablet: 2 tab(s) orally every 6 hours, As needed, Temp greater or equal to 38C (100.4F), Moderate Pain (4 - 6) (13 Aug 2019 23:36)  amLODIPine 2.5 mg oral tablet: 1 tab(s) orally once a day (13 Aug 2019 23:36)  aspirin 81 mg oral tablet: 1 tab(s) orally once a day (13 Aug 2019 23:36)  atorvastatin 20 mg oral tablet: 1 tab(s) orally once a day (13 Aug 2019 23:36)  escitalopram 10 mg oral tablet: 1 tab(s) orally once a day (13 Aug 2019 23:36)  famotidine 40 mg oral tablet: 1 tab(s) orally once a day (at bedtime) (13 Aug 2019 23:36)  Flonase 50 mcg/inh nasal spray: 1 spray(s) nasal once a day (13 Aug 2019 23:36)  furosemide 20 mg oral tablet: 1 tab(s) orally once a day (13 Aug 2019 23:36)  gabapentin 100 mg oral capsule: 1 cap(s) orally 3 times a day (13 Aug 2019 23:36)  LORazepam 0.5 mg oral tablet: 1 tab(s) orally 2 times a day, As Needed for anxiety (13 Aug 2019 23:36)  Metoprolol Tartrate 100 mg oral tablet: 1 tab(s) orally 2 times a day (13 Aug 2019 23:36)  montelukast 10 mg oral tablet: 1 tab(s) orally once a day (13 Aug 2019 23:36)  Symbicort 80 mcg-4.5 mcg/inh inhalation aerosol: 2 puff(s) inhaled 2 times a day (13 Aug 2019 23:36)      HEALTH ISSUES - PROBLEM Dx:  Acute on chronic systolic congestive heart failure  Bipolar 1 disorder  PVD (peripheral vascular disease)  Other emphysema  Other cardiomyopathy  TIA (transient ischemic attack)  COPD (chronic obstructive pulmonary disease)  Depression  Hypertension  History of cholecystectomy

## 2019-08-17 NOTE — DISCHARGE NOTE NURSING/CASE MANAGEMENT/SOCIAL WORK - NSDCDPATPORTLINK_GEN_ALL_CORE
You can access the [facility] Patient Portal, offered by [facility], by registering with the following website: [email or web address]

## 2019-08-17 NOTE — DISCHARGE NOTE PROVIDER - HOSPITAL COURSE
74F h/o COPD on home O2, HTN, Diastolic CHF presented with SOB & LUQ pain. Since her discharge in 6/2019 she had been chronically dyspneic with difficulty ambulating but worsened dyspnea acutely with onset of LUQ sa day PTP.  LUQ pain had resolved by the time of presentation with no associated fever, chills, CP, n/v/d, lightheadedness, syncope. She had actually gone in to see her PMD when a low pulse ox reading in the 70s on RA was noted in the office. CT chest done showed pulmonary edema with small bilateral pleural effusions.        Patient has been hospitalised multiple times and has been compliant with CPAP whilst in Patient. She is being discharged home to follow up with Pulmonologist for a repeat sleep study but will still require the use of CPAP at night as failure to so will be detrimental to her health.             Assessment & Plan:        1. Dyspnea due to Volume overload: Improved.    Continued PO Lasix. Intake & Output.    ECHO: 8/14/19: LVEF >55%, Diastolic Dysfunction.     Cardiology following: appreciate input. Recommendations noted.    Stress ECHO out Patient.            2. h/o COPD:    No acute exacerbation.    Pulm following: Continue Diuresis & Symbicort.    Supplemental oxygen.    Follow up out patient with Pulm.            3. FLAVIO:    CPAP qhs.    Sleep study out patient.            4. HTN:    BP stable on Lopressor, Lasix and Amlodipine.                5. DLD:    Continued Statin.            6. Depression:    On Citalopram.            7. RT Hip and Lower back pain:    X-ray showed Degenerative changes.    Will need out patient MRI of the LS spine.    Continue Gabapentin and Tylenol ATC. Refused Lidocaine patches.    Follow up with Dr. Vigil out patient.

## 2019-08-17 NOTE — DISCHARGE NOTE PROVIDER - PROVIDER TOKENS
PROVIDER:[TOKEN:[[id number]]],PROVIDER:[TOKEN:[[id number]]],PROVIDER:[TOKEN:[[id number]]]

## 2019-08-17 NOTE — DISCHARGE NOTE PROVIDER - NSDCFUADDAPPT_GEN_ALL_CORE_FT
Heart failure (HF) is a condition that does not allow your heart to fill or pump properly. Not enough oxygen in your blood gets to your organs and tissues. HF can occur in the right side, the left side, or both lower chambers of your heart. HF is often caused by damage or injury to your heart. The damage may be caused by heart attack, other heart conditions, or high blood pressure. HF is a long-term condition that tends to get worse over time. It is important to manage your health to improve your quality of life. HF can be worsened by heavy alcohol use, smoking, diabetes that is not controlled, or obesity.    Call 911 for:  Squeezing, pressure, or pain in your chest that lasts longer than 5 minutes or returns  Discomfort or pain in your back, neck, jaw, stomach, or arm  Trouble breathing  Nausea or vomiting  Lightheadedness or a sudden cold sweat, especially with chest pain or trouble breathing.    Seek care immediately if:  You gain 3 or more pounds (1.4 kg) in a day  Your heartbeat is fast, slow, or uneven all the time.    Do not smoke. Nicotine and other chemicals in cigarettes and cigars can cause lung damage and make HF difficult to manage.   Do not drink alcohol or take illegal drugs. . Weigh yourself every morning. Use the same scale, in the same spot. Do this after you use the bathroom, but before you eat or drink anything. Record your weight each day so you will notice any sudden weight gain. Swelling and weight gain are signs of fluid retention. Manage any chronic health conditions you have. These include high blood pressure, diabetes, obesity, high cholesterol, metabolic syndrome, and COPD. Stay active. If you are not active, your symptoms are likely to worsen quickly. Get a flu shot every year. You may also need the pneumonia vaccine. The flu and pneumonia can be severe for a person who has HF.

## 2019-08-21 VITALS
RESPIRATION RATE: 16 BRPM | SYSTOLIC BLOOD PRESSURE: 130 MMHG | DIASTOLIC BLOOD PRESSURE: 80 MMHG | HEART RATE: 76 BPM | TEMPERATURE: 98 F | OXYGEN SATURATION: 97 %

## 2019-08-21 DIAGNOSIS — I73.9 PERIPHERAL VASCULAR DISEASE, UNSPECIFIED: ICD-10-CM

## 2019-08-21 DIAGNOSIS — F31.9 BIPOLAR DISORDER, UNSPECIFIED: ICD-10-CM

## 2019-08-21 DIAGNOSIS — Z86.73 PERSONAL HISTORY OF TRANSIENT ISCHEMIC ATTACK (TIA), AND CEREBRAL INFARCTION WITHOUT RESIDUAL DEFICITS: ICD-10-CM

## 2019-08-21 DIAGNOSIS — M25.551 PAIN IN RIGHT HIP: ICD-10-CM

## 2019-08-21 DIAGNOSIS — E78.5 HYPERLIPIDEMIA, UNSPECIFIED: ICD-10-CM

## 2019-08-21 DIAGNOSIS — E87.70 FLUID OVERLOAD, UNSPECIFIED: ICD-10-CM

## 2019-08-21 DIAGNOSIS — J44.9 CHRONIC OBSTRUCTIVE PULMONARY DISEASE, UNSPECIFIED: ICD-10-CM

## 2019-08-21 DIAGNOSIS — I42.8 OTHER CARDIOMYOPATHIES: ICD-10-CM

## 2019-08-21 DIAGNOSIS — I50.33 ACUTE ON CHRONIC DIASTOLIC (CONGESTIVE) HEART FAILURE: ICD-10-CM

## 2019-08-21 DIAGNOSIS — Z79.82 LONG TERM (CURRENT) USE OF ASPIRIN: ICD-10-CM

## 2019-08-21 DIAGNOSIS — G47.33 OBSTRUCTIVE SLEEP APNEA (ADULT) (PEDIATRIC): ICD-10-CM

## 2019-08-21 DIAGNOSIS — I11.0 HYPERTENSIVE HEART DISEASE WITH HEART FAILURE: ICD-10-CM

## 2019-08-21 DIAGNOSIS — Z99.81 DEPENDENCE ON SUPPLEMENTAL OXYGEN: ICD-10-CM

## 2019-08-22 NOTE — HISTORY OF PRESENT ILLNESS
[FreeTextEntry1] : Patient evaluated at home for SOB post hospitalization for CHF.  patient is unable to leave home as it requires a considerable and taxing effort, requires assistive devices to leave home.\par  [de-identified] : This is a 74 F currently enrolled in the STARS program with a dx of CHF.  PMH: COPD on home O2, HTN, Diastolic CHF \par Patient has been hospitalized multiple times and has been compliant with CPAP. . She  was discharged home to follow up with Pulmonologist fhor a repeat sleep study but will still require the use of CPAP at night .  On arrival to home pt is awake, alert and oriented sitting upright in cair with NC o2 in use.  Her  was present at time of visit.  She reported having a follow up with Dr Chavez scheduled for 8/29/19,  She was awaiting [facility] SOC.

## 2019-08-22 NOTE — PLAN
[FreeTextEntry1] : CHF- c/w lasix 40 mg daily, c/w fluid restriction and daily weights\par COPD- c/w symbicort, c/w oxygen, c/w mininebs as needed\par pt encouraged to follow  COPD and CHF action plans and to adhere to all medical interventions.

## 2019-08-22 NOTE — COUNSELING
[de-identified] : [email or web address] TCM STARS teaching: Enforced with patient need for daily weights. Advised to call for an increase of greater than 2 lbs. in a day or 5 pounds in a week. Adhere to low salt diet and educated patient on foods that should be avoided such as processed or fast food. Limit fluids to 1 liter a day which is 4-5 glasses. Continue medications as ordered.  Follow up with Cardiologist. Contact information given, patient advised to call with any questions/concerns\par  Advised patient to adhere to all medications including demonstrating proper use of inhalers and nebulizers. Encourage the use of proper breathing techniques such as pursed lip breathing or leaning forward during exhalation. Patient understands proper use of oxygen therapy. Increase activity as tolerated and maintain optimal activity levels. Continue coughing and deep breathing exercises including use of Incentive Spirometry. Receive routine pneumococcal and influenza vaccinations. Identify early signs and sx of disease and notify NP/MD. Maintain proper nutrition and hydration. Follow up with Pulmonologist within 7 days of discharge. Contact information given, patient advised to call with any questions/concerns. \par \par

## 2019-09-08 ENCOUNTER — EMERGENCY (EMERGENCY)
Facility: HOSPITAL | Age: 74
LOS: 0 days | Discharge: HOME | End: 2019-09-08
Attending: EMERGENCY MEDICINE | Admitting: EMERGENCY MEDICINE
Payer: MEDICARE

## 2019-09-08 VITALS
DIASTOLIC BLOOD PRESSURE: 67 MMHG | OXYGEN SATURATION: 94 % | HEIGHT: 62 IN | SYSTOLIC BLOOD PRESSURE: 137 MMHG | WEIGHT: 190.04 LBS | HEART RATE: 84 BPM | RESPIRATION RATE: 18 BRPM | TEMPERATURE: 98 F

## 2019-09-08 DIAGNOSIS — Z90.49 ACQUIRED ABSENCE OF OTHER SPECIFIED PARTS OF DIGESTIVE TRACT: Chronic | ICD-10-CM

## 2019-09-08 DIAGNOSIS — I50.9 HEART FAILURE, UNSPECIFIED: ICD-10-CM

## 2019-09-08 DIAGNOSIS — Z88.8 ALLERGY STATUS TO OTHER DRUGS, MEDICAMENTS AND BIOLOGICAL SUBSTANCES STATUS: ICD-10-CM

## 2019-09-08 DIAGNOSIS — I11.0 HYPERTENSIVE HEART DISEASE WITH HEART FAILURE: ICD-10-CM

## 2019-09-08 DIAGNOSIS — R60.0 LOCALIZED EDEMA: ICD-10-CM

## 2019-09-08 DIAGNOSIS — Z79.82 LONG TERM (CURRENT) USE OF ASPIRIN: ICD-10-CM

## 2019-09-08 DIAGNOSIS — R06.02 SHORTNESS OF BREATH: ICD-10-CM

## 2019-09-08 DIAGNOSIS — M79.89 OTHER SPECIFIED SOFT TISSUE DISORDERS: ICD-10-CM

## 2019-09-08 DIAGNOSIS — Z88.5 ALLERGY STATUS TO NARCOTIC AGENT: ICD-10-CM

## 2019-09-08 LAB
ALBUMIN SERPL ELPH-MCNC: 4.3 G/DL — SIGNIFICANT CHANGE UP (ref 3.5–5.2)
ALP SERPL-CCNC: 151 U/L — HIGH (ref 30–115)
ALT FLD-CCNC: 15 U/L — SIGNIFICANT CHANGE UP (ref 0–41)
ANION GAP SERPL CALC-SCNC: 13 MMOL/L — SIGNIFICANT CHANGE UP (ref 7–14)
APTT BLD: 26.8 SEC — LOW (ref 27–39.2)
AST SERPL-CCNC: 32 U/L — SIGNIFICANT CHANGE UP (ref 0–41)
BASOPHILS # BLD AUTO: 0.04 K/UL — SIGNIFICANT CHANGE UP (ref 0–0.2)
BASOPHILS NFR BLD AUTO: 0.8 % — SIGNIFICANT CHANGE UP (ref 0–1)
BILIRUB SERPL-MCNC: 0.2 MG/DL — SIGNIFICANT CHANGE UP (ref 0.2–1.2)
BUN SERPL-MCNC: 36 MG/DL — HIGH (ref 10–20)
CALCIUM SERPL-MCNC: 9 MG/DL — SIGNIFICANT CHANGE UP (ref 8.5–10.1)
CHLORIDE SERPL-SCNC: 103 MMOL/L — SIGNIFICANT CHANGE UP (ref 98–110)
CO2 SERPL-SCNC: 24 MMOL/L — SIGNIFICANT CHANGE UP (ref 17–32)
CREAT SERPL-MCNC: 1 MG/DL — SIGNIFICANT CHANGE UP (ref 0.7–1.5)
EOSINOPHIL # BLD AUTO: 0.22 K/UL — SIGNIFICANT CHANGE UP (ref 0–0.7)
EOSINOPHIL NFR BLD AUTO: 4.4 % — SIGNIFICANT CHANGE UP (ref 0–8)
GLUCOSE SERPL-MCNC: 122 MG/DL — HIGH (ref 70–99)
HCT VFR BLD CALC: 40 % — SIGNIFICANT CHANGE UP (ref 37–47)
HGB BLD-MCNC: 12.8 G/DL — SIGNIFICANT CHANGE UP (ref 12–16)
IMM GRANULOCYTES NFR BLD AUTO: 0.2 % — SIGNIFICANT CHANGE UP (ref 0.1–0.3)
INR BLD: 0.99 RATIO — SIGNIFICANT CHANGE UP (ref 0.65–1.3)
LYMPHOCYTES # BLD AUTO: 1.13 K/UL — LOW (ref 1.2–3.4)
LYMPHOCYTES # BLD AUTO: 22.6 % — SIGNIFICANT CHANGE UP (ref 20.5–51.1)
MAGNESIUM SERPL-MCNC: 2 MG/DL — SIGNIFICANT CHANGE UP (ref 1.8–2.4)
MCHC RBC-ENTMCNC: 31.1 PG — HIGH (ref 27–31)
MCHC RBC-ENTMCNC: 32 G/DL — SIGNIFICANT CHANGE UP (ref 32–37)
MCV RBC AUTO: 97.3 FL — SIGNIFICANT CHANGE UP (ref 81–99)
MONOCYTES # BLD AUTO: 0.77 K/UL — HIGH (ref 0.1–0.6)
MONOCYTES NFR BLD AUTO: 15.4 % — HIGH (ref 1.7–9.3)
NEUTROPHILS # BLD AUTO: 2.82 K/UL — SIGNIFICANT CHANGE UP (ref 1.4–6.5)
NEUTROPHILS NFR BLD AUTO: 56.6 % — SIGNIFICANT CHANGE UP (ref 42.2–75.2)
NRBC # BLD: 0 /100 WBCS — SIGNIFICANT CHANGE UP (ref 0–0)
NT-PROBNP SERPL-SCNC: 661 PG/ML — HIGH (ref 0–300)
PLATELET # BLD AUTO: 219 K/UL — SIGNIFICANT CHANGE UP (ref 130–400)
POTASSIUM SERPL-MCNC: 4.5 MMOL/L — SIGNIFICANT CHANGE UP (ref 3.5–5)
POTASSIUM SERPL-SCNC: 4.5 MMOL/L — SIGNIFICANT CHANGE UP (ref 3.5–5)
PROT SERPL-MCNC: 6.8 G/DL — SIGNIFICANT CHANGE UP (ref 6–8)
PROTHROM AB SERPL-ACNC: 11.4 SEC — SIGNIFICANT CHANGE UP (ref 9.95–12.87)
RBC # BLD: 4.11 M/UL — LOW (ref 4.2–5.4)
RBC # FLD: 17.4 % — HIGH (ref 11.5–14.5)
SODIUM SERPL-SCNC: 140 MMOL/L — SIGNIFICANT CHANGE UP (ref 135–146)
TROPONIN T SERPL-MCNC: <0.01 NG/ML — SIGNIFICANT CHANGE UP
WBC # BLD: 4.99 K/UL — SIGNIFICANT CHANGE UP (ref 4.8–10.8)
WBC # FLD AUTO: 4.99 K/UL — SIGNIFICANT CHANGE UP (ref 4.8–10.8)

## 2019-09-08 PROCEDURE — 99285 EMERGENCY DEPT VISIT HI MDM: CPT

## 2019-09-08 PROCEDURE — 93970 EXTREMITY STUDY: CPT | Mod: 26

## 2019-09-08 PROCEDURE — 71045 X-RAY EXAM CHEST 1 VIEW: CPT | Mod: 26

## 2019-09-08 NOTE — ED PROVIDER NOTE - PATIENT PORTAL LINK FT
You can access the FollowMyHealth Patient Portal offered by [facility] by registering at the following website: [email or web address]. By joining [employer]’s FollowMyHealth portal, you will also be able to view your health information using other applications (apps) compatible with our system.

## 2019-09-08 NOTE — ED CLERICAL - NS ED CLERK NOTE PRE-ARRIVAL INFORMATION; ADDITIONAL PRE-ARRIVAL INFORMATION
This patient is enrolled in the STARS readmission reduction initiative and has active care navigation. This patient can be followed up by the care navigation team within 24 hours.  To arrange close follow-up or to obtain additional clinical information, please call the contact number above. The on call [facility] Hospitalist has been notified and will coordinate care in concert with the ED Physician including consults as necessary. Call [phone] (North), [phone] (South) to reach the hospitalist. Consider CDU for management per guidelines

## 2019-09-08 NOTE — ED ADULT NURSE NOTE - NSIMPLEMENTINTERV_GEN_ALL_ED
Implemented All Fall with Harm Risk Interventions:  [place] to call system. Call bell, personal items and telephone within reach. Instruct patient to call for assistance. Room bathroom lighting operational. Non-slip footwear when patient is off stretcher. Physically safe environment: no spills, clutter or unnecessary equipment. Stretcher in lowest position, wheels locked, appropriate side rails in place. Provide visual cue, wrist band, yellow gown, etc. Monitor gait and stability. Monitor for mental status changes and reorient to person, place, and time. Review medications for side effects contributing to fall risk. Reinforce activity limits and safety measures with patient and family. Provide visual clues: red socks.

## 2019-09-08 NOTE — ED PROVIDER NOTE - OBJECTIVE STATEMENT
Patient c/o feet swelling today, H/o CHF, COPD, Denies any worsening of her baseline breathing, Home O2 dependent, No leg pain, no numbness, Admitted to keep leg below heart 24/7, sleeps in recliner.

## 2019-09-08 NOTE — ED PROVIDER NOTE - CLINICAL SUMMARY MEDICAL DECISION MAKING FREE TEXT BOX
pt with dependent edema, no change in baseline dyspnea, labs/xr improved from recent admission, will d/c to elevate legs, f/u with pmd. Patient counseled regarding conditions which should prompt return.

## 2019-09-08 NOTE — ED ADULT TRIAGE NOTE - CHIEF COMPLAINT QUOTE
Patient complaining of bilateral leg swelling that started this morning and increased difficulty breathing.

## 2019-09-20 ENCOUNTER — INPATIENT (INPATIENT)
Facility: HOSPITAL | Age: 74
LOS: 2 days | Discharge: HOME HEALTH | End: 2019-09-23
Attending: INTERNAL MEDICINE | Admitting: INTERNAL MEDICINE
Payer: MEDICARE

## 2019-09-20 VITALS
OXYGEN SATURATION: 96 % | HEART RATE: 80 BPM | DIASTOLIC BLOOD PRESSURE: 76 MMHG | SYSTOLIC BLOOD PRESSURE: 171 MMHG | RESPIRATION RATE: 20 BRPM

## 2019-09-20 DIAGNOSIS — I73.9 PERIPHERAL VASCULAR DISEASE, UNSPECIFIED: ICD-10-CM

## 2019-09-20 DIAGNOSIS — J81.1 CHRONIC PULMONARY EDEMA: ICD-10-CM

## 2019-09-20 DIAGNOSIS — R07.9 CHEST PAIN, UNSPECIFIED: ICD-10-CM

## 2019-09-20 DIAGNOSIS — R20.0 ANESTHESIA OF SKIN: ICD-10-CM

## 2019-09-20 DIAGNOSIS — J44.9 CHRONIC OBSTRUCTIVE PULMONARY DISEASE, UNSPECIFIED: ICD-10-CM

## 2019-09-20 DIAGNOSIS — Z90.49 ACQUIRED ABSENCE OF OTHER SPECIFIED PARTS OF DIGESTIVE TRACT: Chronic | ICD-10-CM

## 2019-09-20 LAB
ALBUMIN SERPL ELPH-MCNC: 4.1 G/DL — SIGNIFICANT CHANGE UP (ref 3.5–5.2)
ALP SERPL-CCNC: 168 U/L — HIGH (ref 30–115)
ALT FLD-CCNC: 20 U/L — SIGNIFICANT CHANGE UP (ref 0–41)
ANION GAP SERPL CALC-SCNC: 13 MMOL/L — SIGNIFICANT CHANGE UP (ref 7–14)
APTT BLD: 32.2 SEC — SIGNIFICANT CHANGE UP (ref 27–39.2)
AST SERPL-CCNC: 30 U/L — SIGNIFICANT CHANGE UP (ref 0–41)
BASE EXCESS BLDV CALC-SCNC: 6 MMOL/L — HIGH (ref -2–2)
BILIRUB SERPL-MCNC: <0.2 MG/DL — SIGNIFICANT CHANGE UP (ref 0.2–1.2)
BUN SERPL-MCNC: 38 MG/DL — HIGH (ref 10–20)
CA-I SERPL-SCNC: 1.21 MMOL/L — SIGNIFICANT CHANGE UP (ref 1.12–1.3)
CALCIUM SERPL-MCNC: 9.6 MG/DL — SIGNIFICANT CHANGE UP (ref 8.5–10.1)
CHLORIDE SERPL-SCNC: 100 MMOL/L — SIGNIFICANT CHANGE UP (ref 98–110)
CO2 SERPL-SCNC: 29 MMOL/L — SIGNIFICANT CHANGE UP (ref 17–32)
CREAT SERPL-MCNC: 0.9 MG/DL — SIGNIFICANT CHANGE UP (ref 0.7–1.5)
GAS PNL BLDV: 143 MMOL/L — SIGNIFICANT CHANGE UP (ref 136–145)
GAS PNL BLDV: SIGNIFICANT CHANGE UP
GLUCOSE SERPL-MCNC: 116 MG/DL — HIGH (ref 70–99)
HCO3 BLDV-SCNC: 32 MMOL/L — HIGH (ref 22–29)
HCT VFR BLD CALC: 37.8 % — SIGNIFICANT CHANGE UP (ref 37–47)
HCT VFR BLDA CALC: 42.2 % — SIGNIFICANT CHANGE UP (ref 34–44)
HGB BLD CALC-MCNC: 13.8 G/DL — LOW (ref 14–18)
HGB BLD-MCNC: 12.6 G/DL — SIGNIFICANT CHANGE UP (ref 12–16)
HOROWITZ INDEX BLDV+IHG-RTO: 21 — SIGNIFICANT CHANGE UP
INR BLD: 1.02 RATIO — SIGNIFICANT CHANGE UP (ref 0.65–1.3)
LACTATE BLDV-MCNC: 1.5 MMOL/L — SIGNIFICANT CHANGE UP (ref 0.5–1.6)
MAGNESIUM SERPL-MCNC: 2 MG/DL — SIGNIFICANT CHANGE UP (ref 1.8–2.4)
MCHC RBC-ENTMCNC: 32 PG — HIGH (ref 27–31)
MCHC RBC-ENTMCNC: 33.3 G/DL — SIGNIFICANT CHANGE UP (ref 32–37)
MCV RBC AUTO: 95.9 FL — SIGNIFICANT CHANGE UP (ref 81–99)
NRBC # BLD: 0 /100 WBCS — SIGNIFICANT CHANGE UP (ref 0–0)
NT-PROBNP SERPL-SCNC: 589 PG/ML — HIGH (ref 0–300)
PCO2 BLDV: 49 MMHG — SIGNIFICANT CHANGE UP (ref 41–51)
PH BLDV: 7.42 — SIGNIFICANT CHANGE UP (ref 7.26–7.43)
PLATELET # BLD AUTO: 247 K/UL — SIGNIFICANT CHANGE UP (ref 130–400)
PO2 BLDV: 34 MMHG — SIGNIFICANT CHANGE UP (ref 20–40)
POTASSIUM BLDV-SCNC: 3.8 MMOL/L — SIGNIFICANT CHANGE UP (ref 3.3–5.6)
POTASSIUM SERPL-MCNC: 4.1 MMOL/L — SIGNIFICANT CHANGE UP (ref 3.5–5)
POTASSIUM SERPL-SCNC: 4.1 MMOL/L — SIGNIFICANT CHANGE UP (ref 3.5–5)
PROT SERPL-MCNC: 6.6 G/DL — SIGNIFICANT CHANGE UP (ref 6–8)
PROTHROM AB SERPL-ACNC: 11.7 SEC — SIGNIFICANT CHANGE UP (ref 9.95–12.87)
RBC # BLD: 3.94 M/UL — LOW (ref 4.2–5.4)
RBC # FLD: 15.5 % — HIGH (ref 11.5–14.5)
SAO2 % BLDV: 60 % — SIGNIFICANT CHANGE UP
SODIUM SERPL-SCNC: 142 MMOL/L — SIGNIFICANT CHANGE UP (ref 135–146)
TROPONIN T SERPL-MCNC: <0.01 NG/ML — SIGNIFICANT CHANGE UP
WBC # BLD: 5.88 K/UL — SIGNIFICANT CHANGE UP (ref 4.8–10.8)
WBC # FLD AUTO: 5.88 K/UL — SIGNIFICANT CHANGE UP (ref 4.8–10.8)

## 2019-09-20 PROCEDURE — 99285 EMERGENCY DEPT VISIT HI MDM: CPT | Mod: GC

## 2019-09-20 PROCEDURE — 99233 SBSQ HOSP IP/OBS HIGH 50: CPT

## 2019-09-20 PROCEDURE — 71045 X-RAY EXAM CHEST 1 VIEW: CPT | Mod: 26

## 2019-09-20 PROCEDURE — 71275 CT ANGIOGRAPHY CHEST: CPT | Mod: 26

## 2019-09-20 PROCEDURE — 70450 CT HEAD/BRAIN W/O DYE: CPT | Mod: 26

## 2019-09-20 RX ORDER — ALBUTEROL 90 UG/1
2 AEROSOL, METERED ORAL EVERY 6 HOURS
Refills: 0 | Status: DISCONTINUED | OUTPATIENT
Start: 2019-09-20 | End: 2019-09-20

## 2019-09-20 RX ORDER — ATORVASTATIN CALCIUM 80 MG/1
20 TABLET, FILM COATED ORAL AT BEDTIME
Refills: 0 | Status: DISCONTINUED | OUTPATIENT
Start: 2019-09-20 | End: 2019-09-23

## 2019-09-20 RX ORDER — ALBUTEROL 90 UG/1
2.5 AEROSOL, METERED ORAL EVERY 6 HOURS
Refills: 0 | Status: DISCONTINUED | OUTPATIENT
Start: 2019-09-20 | End: 2019-09-23

## 2019-09-20 RX ORDER — AMLODIPINE BESYLATE 2.5 MG/1
2.5 TABLET ORAL DAILY
Refills: 0 | Status: DISCONTINUED | OUTPATIENT
Start: 2019-09-20 | End: 2019-09-22

## 2019-09-20 RX ORDER — FUROSEMIDE 40 MG
40 TABLET ORAL ONCE
Refills: 0 | Status: COMPLETED | OUTPATIENT
Start: 2019-09-20 | End: 2019-09-20

## 2019-09-20 RX ORDER — ASPIRIN/CALCIUM CARB/MAGNESIUM 324 MG
81 TABLET ORAL DAILY
Refills: 0 | Status: DISCONTINUED | OUTPATIENT
Start: 2019-09-20 | End: 2019-09-23

## 2019-09-20 RX ORDER — INFLUENZA VIRUS VACCINE 15; 15; 15; 15 UG/.5ML; UG/.5ML; UG/.5ML; UG/.5ML
0.5 SUSPENSION INTRAMUSCULAR ONCE
Refills: 0 | Status: DISCONTINUED | OUTPATIENT
Start: 2019-09-20 | End: 2019-09-23

## 2019-09-20 RX ORDER — METOPROLOL TARTRATE 50 MG
100 TABLET ORAL
Refills: 0 | Status: DISCONTINUED | OUTPATIENT
Start: 2019-09-20 | End: 2019-09-23

## 2019-09-20 RX ORDER — ACETAMINOPHEN 500 MG
650 TABLET ORAL EVERY 6 HOURS
Refills: 0 | Status: DISCONTINUED | OUTPATIENT
Start: 2019-09-20 | End: 2019-09-23

## 2019-09-20 RX ORDER — FLUTICASONE PROPIONATE 50 MCG
1 SPRAY, SUSPENSION NASAL
Qty: 0 | Refills: 0 | DISCHARGE

## 2019-09-20 RX ORDER — BUDESONIDE AND FORMOTEROL FUMARATE DIHYDRATE 160; 4.5 UG/1; UG/1
2 AEROSOL RESPIRATORY (INHALATION)
Refills: 0 | Status: DISCONTINUED | OUTPATIENT
Start: 2019-09-20 | End: 2019-09-20

## 2019-09-20 RX ORDER — QUETIAPINE FUMARATE 200 MG/1
25 TABLET, FILM COATED ORAL DAILY
Refills: 0 | Status: DISCONTINUED | OUTPATIENT
Start: 2019-09-20 | End: 2019-09-23

## 2019-09-20 RX ORDER — FAMOTIDINE 10 MG/ML
40 INJECTION INTRAVENOUS DAILY
Refills: 0 | Status: DISCONTINUED | OUTPATIENT
Start: 2019-09-20 | End: 2019-09-23

## 2019-09-20 RX ORDER — NITROGLYCERIN 6.5 MG
0.4 CAPSULE, EXTENDED RELEASE ORAL
Refills: 0 | Status: DISCONTINUED | OUTPATIENT
Start: 2019-09-20 | End: 2019-09-23

## 2019-09-20 RX ORDER — HEPARIN SODIUM 5000 [USP'U]/ML
5000 INJECTION INTRAVENOUS; SUBCUTANEOUS EVERY 8 HOURS
Refills: 0 | Status: DISCONTINUED | OUTPATIENT
Start: 2019-09-20 | End: 2019-09-23

## 2019-09-20 RX ORDER — MORPHINE SULFATE 50 MG/1
4 CAPSULE, EXTENDED RELEASE ORAL ONCE
Refills: 0 | Status: DISCONTINUED | OUTPATIENT
Start: 2019-09-20 | End: 2019-09-20

## 2019-09-20 RX ORDER — ESCITALOPRAM OXALATE 10 MG/1
10 TABLET, FILM COATED ORAL DAILY
Refills: 0 | Status: DISCONTINUED | OUTPATIENT
Start: 2019-09-20 | End: 2019-09-23

## 2019-09-20 RX ORDER — FUROSEMIDE 40 MG
40 TABLET ORAL
Refills: 0 | Status: DISCONTINUED | OUTPATIENT
Start: 2019-09-20 | End: 2019-09-22

## 2019-09-20 RX ORDER — GABAPENTIN 400 MG/1
300 CAPSULE ORAL THREE TIMES A DAY
Refills: 0 | Status: DISCONTINUED | OUTPATIENT
Start: 2019-09-20 | End: 2019-09-23

## 2019-09-20 RX ORDER — FLUTICASONE FUROATE, UMECLIDINIUM BROMIDE AND VILANTEROL TRIFENATATE 200; 62.5; 25 UG/1; UG/1; UG/1
2 POWDER RESPIRATORY (INHALATION) DAILY
Refills: 0 | Status: DISCONTINUED | OUTPATIENT
Start: 2019-09-20 | End: 2019-09-23

## 2019-09-20 RX ADMIN — Medication 0.4 MILLIGRAM(S): at 15:40

## 2019-09-20 RX ADMIN — ALBUTEROL 2.5 MILLIGRAM(S): 90 AEROSOL, METERED ORAL at 19:42

## 2019-09-20 RX ADMIN — ATORVASTATIN CALCIUM 20 MILLIGRAM(S): 80 TABLET, FILM COATED ORAL at 22:39

## 2019-09-20 RX ADMIN — GABAPENTIN 300 MILLIGRAM(S): 400 CAPSULE ORAL at 22:39

## 2019-09-20 RX ADMIN — Medication 40 MILLIGRAM(S): at 15:39

## 2019-09-20 RX ADMIN — MORPHINE SULFATE 4 MILLIGRAM(S): 50 CAPSULE, EXTENDED RELEASE ORAL at 15:39

## 2019-09-20 RX ADMIN — HEPARIN SODIUM 5000 UNIT(S): 5000 INJECTION INTRAVENOUS; SUBCUTANEOUS at 22:39

## 2019-09-20 RX ADMIN — ALBUTEROL 2 PUFF(S): 90 AEROSOL, METERED ORAL at 19:42

## 2019-09-20 NOTE — H&P ADULT - NSHPLABSRESULTS_GEN_ALL_CORE
12.6   5.88  )-----------( 247      ( 20 Sep 2019 15:30 )             37.8       09-20    142  |  100  |  38<H>  ----------------------------<  116<H>  4.1   |  29  |  0.9    Ca    9.6      20 Sep 2019 15:30  Mg     2.0     09-20    TPro  6.6  /  Alb  4.1  /  TBili  <0.2  /  DBili  x   /  AST  30  /  ALT  20  /  AlkPhos  168<H>  09-20          Magnesium, Serum: 2.0 mg/dL (09-20-19 @ 15:30)    PT/INR - ( 20 Sep 2019 15:30 )   PT: 11.70 sec;   INR: 1.02 ratio         PTT - ( 20 Sep 2019 15:30 )  PTT:32.2 sec    Lactate Trend      CARDIAC MARKERS ( 20 Sep 2019 15:30 )  x     / <0.01 ng/mL / x     / x     / x          < from: CT Head No Cont (09.20.19 @ 16:21) >    IMPRESSION:     No evidence of acute intracranial pathology. Stable exam since 5/31/2019.      BERRY ROMERO M.D., ATTENDING RADIOLOGIST    < end of copied text >    < from: CT Angio Chest Dissection Protocol (09.20.19 @ 16:21) >    IMPRESSION:   No evidence of aortic dissection or intramural hematoma.  Interstitial pulmonary edema.        SHILPA SAGASTUME M.D., RESIDENT RADIOLOGIST    < end of copied text >

## 2019-09-20 NOTE — ED PROVIDER NOTE - PROGRESS NOTE DETAILS
Concern for aortic dissection - will get CT ANgio without waiting for labs CT Head nl, labs nl, mildly elevated BNP, NIHSS 0 with limited lower extremity drift and heel to shin due to arthritis CT Head nl, labs nl, cxr interstitial opacities, mildly elevated BNP, NIHSS 0 with limited lower extremity drift and heel to shin due to arthritis Her breathing has improved especially after the morphine, but still with some difficulty breathing, numbness has resolved, CTA chest shows some pulmonary edema but no dissection, will admit to non ccu tele, patient agrees with plan

## 2019-09-20 NOTE — ED PROVIDER NOTE - CLINICAL SUMMARY MEDICAL DECISION MAKING FREE TEXT BOX
sudden onset SOB - likely CHF - no PE/dissectoin - unlikely stroke - symptoms resolved with pre-load reducing agents - admitted for serial trops monitoring

## 2019-09-20 NOTE — ED PROVIDER NOTE - CARE PLAN
Principal Discharge DX:	Pulmonary edema  Secondary Diagnosis:	Chest pain  Secondary Diagnosis:	Numbness of arm

## 2019-09-20 NOTE — H&P ADULT - ASSESSMENT
A 74 female with pmhx of TIA, emphysema, HNT, CHF, COPD on 4 L oxygen at home since last November, PVD admitted for CHF exacerbation

## 2019-09-20 NOTE — H&P ADULT - ATTENDING COMMENTS
Agree with PA note, HPI, PE, assessment and plan.  Pt was seen and examined independently.    74 female with MHx of TIA, emphysema, HTN, CHF (not known id syst or diastolic), COPD, emphysema, chronic respiratory failure,, PVD presented to ED with sudden onset of SOB last night started when she was sitting. PT called her PCP and advices to come to ED. PT reports gets shortness of breath with not even a block. PT uses two pillow to sleep in a recliner. She was seen by her PMD on Tuesday who increased the dose of lasix to 80 mg QD and LE edema started subsiding.   At the time when SOB started Pt did not have chest pain, palpitations, BP was in 120's. PT reports on her way to the hospital she started developing numbness and tingling on her left arm and her speech was getting slurry. Pt reports follows up with pulmonologist Dr. Reeves yesterday.    SOB resolved in ED after dose of lasix, nitro and morphine.  Physical exam:  NAD, on 4L NC, speaks in full sentences  HEENT: PERRL  NECK: supple, no JVD  RESP: CTA b/l, no crackles, rhonchi or wheezes  CVS: S1S2, RRR  GI: abdomen soft NT, ND  Extremities: +1 pitting edema  of feet  NEURO: AOx3, no focal deficit  H/L: no enlarged LN noted     labs and images reviewed by me.     A/P: # SOB, likely due to CHF exacerbation. PE ruled out.  - ECHO  - tele monitor   - cardio eval  - 3rd set of troponin  - continue IV Lasix  - daily weight, I&O, DASH diet    # COPD, emphysema, chronic respiratory failure  - no signs of exacerbation, cont home meds    DVT ppx

## 2019-09-20 NOTE — ED PROVIDER NOTE - NS ED ROS FT
Review of Systems:  •	CONSTITUTIONAL - No fever, No diaphoresis, No weight change  •	SKIN - No rash  •	HEMATOLOGIC - No abnormal bleeding or bruising  •	EYES - No eye pain, No blurred vision  •	ENT - No change in hearing, No sore throat, No neck pain, No rhinorrhea, No ear pain  •	RESPIRATORY - + shortness of breath, No cough  •	CARDIAC -No chest pain, No palpitations  •	GI - No abdominal pain, No nausea, No vomiting, No diarrhea, No constipation, No bright red blood per rectum or melena. No flank pain  •                 - No dysuria, frequency, hematuria.   •	MUSCULOSKELETAL - No joint paint, No swelling, No back pain  •	NEUROLOGIC - + left jaw and left arm numbness, No focal weakness, No headache, No dizziness  All other systems negative, unless specified in HPI

## 2019-09-20 NOTE — H&P ADULT - NSHPPHYSICALEXAM_GEN_ALL_CORE
VITALS:     Vital Signs Last 24 Hrs  T(C): 36.4 (20 Sep 2019 16:55), Max: 36.4 (20 Sep 2019 16:55)  T(F): 97.6 (20 Sep 2019 16:55), Max: 97.6 (20 Sep 2019 16:55)  HR: 76 (20 Sep 2019 16:55) (76 - 86)  BP: 122/58 (20 Sep 2019 16:55) (93/58 - 171/76)  BP(mean): --  RR: 18 (20 Sep 2019 16:55) (18 - 20)  SpO2: 95% (20 Sep 2019 16:55) (95% - 96%)    GENERAL: NAD, lying in bed comfortably  HEAD:  Atraumatic, Normocephalic  EYES: EOMI, PERRLA, conjunctiva and sclera clear,   ENT: Moist mucous membranes  NECK: Supple, No JVD  CHEST/LUNG: Clear to auscultation bilaterally; No rales, rhonchi, wheezing, or rubs. Unlabored respirations  HEART: Regular rate and rhythm; No murmurs, rubs, or gallops  ABDOMEN: obese,  Soft, Nontender, Nondistended. No hepatomegally  EXTREMITIES:  bilateral pitting edea, 2+, erythema, up to ankles   NERVOUS SYSTEM:  Alert & Oriented X3, speech clear. No deficits   MSK: FROM all 4 extremities, full and equal strength  SKIN: bilateral lower extremity edema

## 2019-09-20 NOTE — ED PROVIDER NOTE - ATTENDING CONTRIBUTION TO CARE
Pt is a 75yo female with hx of angioedema, CHF (Lefkovic), COPD (Maroun) who comes in for SOB today associated with LE edema.  Some chest pain and numbness in L chest/arm/neck.    Exam: mild tongue swelling, no lip swelling, normal uvula, RRR, basilar crackles, LE pitting edema, soft NT abdomen, speaking in full sentences, NAD  Plan: xr, ekg, labs, diuretic Pt is a 73yo female with hx of angioedema, CHF (Lefkovic), COPD (Maroun) who comes in for SOB today associated with LE edema.  Some chest pain and numbness in L chest/arm/neck.    Exam: mild tongue swelling, no lip swelling, normal uvula, RRR, basilar crackles, LE pitting edema, soft NT abdomen, speaking in full sentences, NAD, NIHSS 0  Plan: xr, ekg, labs, diuretic

## 2019-09-20 NOTE — H&P ADULT - NSHPSOCIALHISTORY_GEN_ALL_CORE
Tobacco use: former smoker 15 years ago , for 30 years   EtOH use: denies   Illicit drug use: denies

## 2019-09-20 NOTE — H&P ADULT - NSICDXPASTMEDICALHX_GEN_ALL_CORE_FT
PAST MEDICAL HISTORY:  Allergic reaction     Bipolar 1 disorder     COPD (chronic obstructive pulmonary disease)     Depression     Hypertension     Other cardiomyopathy     Other emphysema     PVD (peripheral vascular disease)     TIA (transient ischemic attack)

## 2019-09-20 NOTE — H&P ADULT - HISTORY OF PRESENT ILLNESS
A 74 female with pmhx of TIA, emphysema, HNT, CHF, COPD on 4 L oxygen at home since last November, PVD presents to ED because could not breath. Pt reports around 2 pm went to check her mail box, when she returned in room could not breath. PT called her PCP and advices to come to ED. PT reports gets shortness of breath with not even a block. PT uses two pillow to sleep in a recliner. Pt denies chest pain at this time. PT denies cough. PT reports on her way to the hospital she started developing numbness and tingling on her left arm and her speech was getting slurry. Pt reports follows up with pulmonologist Dr. Reeves, last seen this week.  Pt reports now feels better . PT denies fever, chills, nausea, vomiting, burning with urination, diarrhea.

## 2019-09-20 NOTE — ED PROVIDER NOTE - OBJECTIVE STATEMENT
The patient is a 75 yo female with a PMHx of PVD, TIA, COPD, diastolic CHF, HTN complaining of shortness of breath x 1 hour PTA. The patient had sudden onset of shortness of breath The patient is a 75 yo female with a PMHx of PVD, TIA, COPD, diastolic CHF, HTN complaining of shortness of breath x 1 hour PTA. The patient had sudden onset of shortness of breath with associated left side of jaw/neck numbness and left arm numbness. Patient saw pulmonologist recently and said her lungs are okay. Patient has had increased b/l leg swelling the past couple of days. Patient denies fever, chills, chest pain, nausea, vomiting, constipation, diarrhea, dysuria, flank pain. The patient is a 75 yo female with a PMHx of PVD, TIA, COPD on 4L home O2, diastolic CHF, HTN complaining of shortness of breath x 1 hour PTA. The patient had sudden onset of shortness of breath with associated left side of jaw/neck numbness and left arm numbness. Patient saw pulmonologist recently and said her lungs are okay. Patient has had increased b/l leg swelling the past couple of days. Patient denies fever, chills, chest pain, nausea, vomiting, constipation, diarrhea, dysuria, flank pain.

## 2019-09-20 NOTE — ED PROVIDER NOTE - PHYSICAL EXAMINATION
CONSTITUTIONAL: Well-developed; well-nourished; in no acute distress.   SKIN: warm, dry  HEAD: Normocephalic; atraumatic.  EYES: no conjunctival injection. PERRL.   ENT: No nasal discharge; airway clear.  NECK: Supple; non tender.  CARD: S1, S2 normal; no murmurs, gallops, or rubs. Regular rate and rhythm.   RESP: No wheezes, rales or rhonchi.  ABD: soft ntnd  EXT: Normal ROM.  No clubbing, cyanosis. 3+ pitting edema lower extremities b/l.  NEURO: Alert, oriented, grossly unremarkable. CN 2 to 12 nl. Finger to nose nl b/l. Rapid alternating movement nl b/l. Limited heel to shin due to arthritis. Full strength in upper and lower extremities. No pronator drift. Limited gait exam and romberg due to arthritis. NIHSS 0 besides limitations.  PSYCH: Cooperative, appropriate. CONSTITUTIONAL: Well-developed; well-nourished; in no acute distress.   SKIN: warm, dry  HEAD: Normocephalic; atraumatic.  EYES: no conjunctival injection. PERRL.   ENT: No nasal discharge; airway clear.  NECK: Supple; non tender.  CARD: S1, S2 normal; no murmurs, gallops, or rubs. Regular rate and rhythm.   RESP: No wheezes, rales or rhonchi.  ABD: soft ntnd  EXT: Normal ROM.  No clubbing, cyanosis. 3+ pitting edema lower extremities b/l.  NEURO: Alert, oriented, grossly unremarkable. CN 2 to 12 nl. Finger to nose nl b/l. Rapid alternating movement nl b/l. Limited heel to shin due to arthritis. Full strength in upper and lower extremities. Nl sensation now. No pronator drift. Limited gait exam and romberg due to arthritis. NIHSS 0 besides limitations.  PSYCH: Cooperative, appropriate. CONSTITUTIONAL: Well-developed; well-nourished; in no acute distress.   SKIN: warm, dry  HEAD: Normocephalic; atraumatic.  EYES: no conjunctival injection. PERRL.   ENT: No nasal discharge; airway clear.  NECK: Supple; non tender.  CARD: S1, S2 normal; no murmurs, gallops, or rubs. Regular rate and rhythm.   RESP: No wheezes, rales or rhonchi. No accessory respiratory muscle use.  ABD: soft ntnd  EXT: Normal ROM.  No clubbing, cyanosis. 3+ pitting edema lower extremities b/l.  NEURO: Alert, oriented, grossly unremarkable. CN 2 to 12 nl. Finger to nose nl b/l. Rapid alternating movement nl b/l. Limited heel to shin due to arthritis. Full strength in upper and lower extremities. Nl sensation now. No pronator drift. Limited gait exam and romberg due to arthritis. NIHSS 0 besides limitations.  PSYCH: Cooperative, appropriate.

## 2019-09-21 LAB
ANION GAP SERPL CALC-SCNC: 11 MMOL/L — SIGNIFICANT CHANGE UP (ref 7–14)
BUN SERPL-MCNC: 37 MG/DL — HIGH (ref 10–20)
CALCIUM SERPL-MCNC: 9.6 MG/DL — SIGNIFICANT CHANGE UP (ref 8.5–10.1)
CHLORIDE SERPL-SCNC: 101 MMOL/L — SIGNIFICANT CHANGE UP (ref 98–110)
CK MB CFR SERPL CALC: 2.1 NG/ML — SIGNIFICANT CHANGE UP (ref 0.6–6.3)
CK SERPL-CCNC: 50 U/L — SIGNIFICANT CHANGE UP (ref 0–225)
CO2 SERPL-SCNC: 30 MMOL/L — SIGNIFICANT CHANGE UP (ref 17–32)
CREAT SERPL-MCNC: 0.9 MG/DL — SIGNIFICANT CHANGE UP (ref 0.7–1.5)
GLUCOSE SERPL-MCNC: 99 MG/DL — SIGNIFICANT CHANGE UP (ref 70–99)
HCT VFR BLD CALC: 40.2 % — SIGNIFICANT CHANGE UP (ref 37–47)
HGB BLD-MCNC: 13 G/DL — SIGNIFICANT CHANGE UP (ref 12–16)
MCHC RBC-ENTMCNC: 31 PG — SIGNIFICANT CHANGE UP (ref 27–31)
MCHC RBC-ENTMCNC: 32.3 G/DL — SIGNIFICANT CHANGE UP (ref 32–37)
MCV RBC AUTO: 95.9 FL — SIGNIFICANT CHANGE UP (ref 81–99)
NRBC # BLD: 0 /100 WBCS — SIGNIFICANT CHANGE UP (ref 0–0)
PLATELET # BLD AUTO: 254 K/UL — SIGNIFICANT CHANGE UP (ref 130–400)
POTASSIUM SERPL-MCNC: 3.8 MMOL/L — SIGNIFICANT CHANGE UP (ref 3.5–5)
POTASSIUM SERPL-SCNC: 3.8 MMOL/L — SIGNIFICANT CHANGE UP (ref 3.5–5)
RBC # BLD: 4.19 M/UL — LOW (ref 4.2–5.4)
RBC # FLD: 16 % — HIGH (ref 11.5–14.5)
SODIUM SERPL-SCNC: 142 MMOL/L — SIGNIFICANT CHANGE UP (ref 135–146)
TROPONIN T SERPL-MCNC: <0.01 NG/ML — SIGNIFICANT CHANGE UP
WBC # BLD: 7.21 K/UL — SIGNIFICANT CHANGE UP (ref 4.8–10.8)
WBC # FLD AUTO: 7.21 K/UL — SIGNIFICANT CHANGE UP (ref 4.8–10.8)

## 2019-09-21 PROCEDURE — 99233 SBSQ HOSP IP/OBS HIGH 50: CPT

## 2019-09-21 PROCEDURE — 93970 EXTREMITY STUDY: CPT | Mod: 26

## 2019-09-21 RX ORDER — TRAMADOL HYDROCHLORIDE 50 MG/1
50 TABLET ORAL EVERY 8 HOURS
Refills: 0 | Status: DISCONTINUED | OUTPATIENT
Start: 2019-09-21 | End: 2019-09-23

## 2019-09-21 RX ORDER — GLYCERIN 1 %
1 DROPS OPHTHALMIC (EYE) THREE TIMES A DAY
Refills: 0 | Status: DISCONTINUED | OUTPATIENT
Start: 2019-09-21 | End: 2019-09-23

## 2019-09-21 RX ORDER — METOPROLOL TARTRATE 50 MG
25 TABLET ORAL DAILY
Refills: 0 | Status: DISCONTINUED | OUTPATIENT
Start: 2019-09-21 | End: 2019-09-22

## 2019-09-21 RX ADMIN — Medication 81 MILLIGRAM(S): at 11:30

## 2019-09-21 RX ADMIN — QUETIAPINE FUMARATE 25 MILLIGRAM(S): 200 TABLET, FILM COATED ORAL at 11:31

## 2019-09-21 RX ADMIN — Medication 100 MILLIGRAM(S): at 06:27

## 2019-09-21 RX ADMIN — TRAMADOL HYDROCHLORIDE 50 MILLIGRAM(S): 50 TABLET ORAL at 22:59

## 2019-09-21 RX ADMIN — Medication 650 MILLIGRAM(S): at 13:59

## 2019-09-21 RX ADMIN — ESCITALOPRAM OXALATE 10 MILLIGRAM(S): 10 TABLET, FILM COATED ORAL at 11:29

## 2019-09-21 RX ADMIN — Medication 1 DROP(S): at 21:37

## 2019-09-21 RX ADMIN — Medication 0.5 MILLIGRAM(S): at 21:43

## 2019-09-21 RX ADMIN — ALBUTEROL 2.5 MILLIGRAM(S): 90 AEROSOL, METERED ORAL at 07:56

## 2019-09-21 RX ADMIN — FLUTICASONE FUROATE, UMECLIDINIUM BROMIDE AND VILANTEROL TRIFENATATE 2 PUFF(S): 200; 62.5; 25 POWDER RESPIRATORY (INHALATION) at 08:22

## 2019-09-21 RX ADMIN — Medication 40 MILLIGRAM(S): at 06:26

## 2019-09-21 RX ADMIN — Medication 1 DROP(S): at 14:00

## 2019-09-21 RX ADMIN — HEPARIN SODIUM 5000 UNIT(S): 5000 INJECTION INTRAVENOUS; SUBCUTANEOUS at 06:28

## 2019-09-21 RX ADMIN — FAMOTIDINE 40 MILLIGRAM(S): 10 INJECTION INTRAVENOUS at 11:29

## 2019-09-21 RX ADMIN — ATORVASTATIN CALCIUM 20 MILLIGRAM(S): 80 TABLET, FILM COATED ORAL at 21:37

## 2019-09-21 RX ADMIN — Medication 650 MILLIGRAM(S): at 11:35

## 2019-09-21 RX ADMIN — Medication 650 MILLIGRAM(S): at 12:00

## 2019-09-21 RX ADMIN — AMLODIPINE BESYLATE 2.5 MILLIGRAM(S): 2.5 TABLET ORAL at 06:28

## 2019-09-21 RX ADMIN — GABAPENTIN 300 MILLIGRAM(S): 400 CAPSULE ORAL at 13:59

## 2019-09-21 RX ADMIN — GABAPENTIN 300 MILLIGRAM(S): 400 CAPSULE ORAL at 21:37

## 2019-09-21 RX ADMIN — HEPARIN SODIUM 5000 UNIT(S): 5000 INJECTION INTRAVENOUS; SUBCUTANEOUS at 21:40

## 2019-09-21 RX ADMIN — ALBUTEROL 2.5 MILLIGRAM(S): 90 AEROSOL, METERED ORAL at 20:30

## 2019-09-21 RX ADMIN — HEPARIN SODIUM 5000 UNIT(S): 5000 INJECTION INTRAVENOUS; SUBCUTANEOUS at 14:00

## 2019-09-21 RX ADMIN — GABAPENTIN 300 MILLIGRAM(S): 400 CAPSULE ORAL at 06:28

## 2019-09-21 RX ADMIN — ALBUTEROL 2.5 MILLIGRAM(S): 90 AEROSOL, METERED ORAL at 13:57

## 2019-09-21 RX ADMIN — Medication 40 MILLIGRAM(S): at 17:39

## 2019-09-21 RX ADMIN — Medication 650 MILLIGRAM(S): at 14:30

## 2019-09-22 LAB
ANION GAP SERPL CALC-SCNC: 12 MMOL/L — SIGNIFICANT CHANGE UP (ref 7–14)
BUN SERPL-MCNC: 39 MG/DL — HIGH (ref 10–20)
CALCIUM SERPL-MCNC: 9.6 MG/DL — SIGNIFICANT CHANGE UP (ref 8.5–10.1)
CHLORIDE SERPL-SCNC: 104 MMOL/L — SIGNIFICANT CHANGE UP (ref 98–110)
CO2 SERPL-SCNC: 29 MMOL/L — SIGNIFICANT CHANGE UP (ref 17–32)
CREAT SERPL-MCNC: 0.9 MG/DL — SIGNIFICANT CHANGE UP (ref 0.7–1.5)
GLUCOSE SERPL-MCNC: 92 MG/DL — SIGNIFICANT CHANGE UP (ref 70–99)
MAGNESIUM SERPL-MCNC: 2.3 MG/DL — SIGNIFICANT CHANGE UP (ref 1.8–2.4)
POTASSIUM SERPL-MCNC: 3.9 MMOL/L — SIGNIFICANT CHANGE UP (ref 3.5–5)
POTASSIUM SERPL-SCNC: 3.9 MMOL/L — SIGNIFICANT CHANGE UP (ref 3.5–5)
SODIUM SERPL-SCNC: 145 MMOL/L — SIGNIFICANT CHANGE UP (ref 135–146)

## 2019-09-22 PROCEDURE — 99232 SBSQ HOSP IP/OBS MODERATE 35: CPT

## 2019-09-22 RX ORDER — FUROSEMIDE 40 MG
60 TABLET ORAL
Refills: 0 | Status: DISCONTINUED | OUTPATIENT
Start: 2019-09-22 | End: 2019-09-23

## 2019-09-22 RX ADMIN — Medication 100 MILLIGRAM(S): at 22:19

## 2019-09-22 RX ADMIN — TRAMADOL HYDROCHLORIDE 50 MILLIGRAM(S): 50 TABLET ORAL at 11:55

## 2019-09-22 RX ADMIN — Medication 81 MILLIGRAM(S): at 11:09

## 2019-09-22 RX ADMIN — FAMOTIDINE 40 MILLIGRAM(S): 10 INJECTION INTRAVENOUS at 11:09

## 2019-09-22 RX ADMIN — ATORVASTATIN CALCIUM 20 MILLIGRAM(S): 80 TABLET, FILM COATED ORAL at 21:12

## 2019-09-22 RX ADMIN — GABAPENTIN 300 MILLIGRAM(S): 400 CAPSULE ORAL at 14:40

## 2019-09-22 RX ADMIN — GABAPENTIN 300 MILLIGRAM(S): 400 CAPSULE ORAL at 06:01

## 2019-09-22 RX ADMIN — GABAPENTIN 300 MILLIGRAM(S): 400 CAPSULE ORAL at 21:12

## 2019-09-22 RX ADMIN — ESCITALOPRAM OXALATE 10 MILLIGRAM(S): 10 TABLET, FILM COATED ORAL at 11:08

## 2019-09-22 RX ADMIN — TRAMADOL HYDROCHLORIDE 50 MILLIGRAM(S): 50 TABLET ORAL at 11:10

## 2019-09-22 RX ADMIN — HEPARIN SODIUM 5000 UNIT(S): 5000 INJECTION INTRAVENOUS; SUBCUTANEOUS at 21:12

## 2019-09-22 RX ADMIN — Medication 0.5 MILLIGRAM(S): at 22:29

## 2019-09-22 RX ADMIN — QUETIAPINE FUMARATE 25 MILLIGRAM(S): 200 TABLET, FILM COATED ORAL at 11:09

## 2019-09-22 RX ADMIN — HEPARIN SODIUM 5000 UNIT(S): 5000 INJECTION INTRAVENOUS; SUBCUTANEOUS at 06:01

## 2019-09-22 RX ADMIN — Medication 100 MILLIGRAM(S): at 06:01

## 2019-09-22 RX ADMIN — Medication 1 DROP(S): at 21:12

## 2019-09-22 RX ADMIN — Medication 1 DROP(S): at 14:41

## 2019-09-22 RX ADMIN — FLUTICASONE FUROATE, UMECLIDINIUM BROMIDE AND VILANTEROL TRIFENATATE 2 PUFF(S): 200; 62.5; 25 POWDER RESPIRATORY (INHALATION) at 07:25

## 2019-09-22 RX ADMIN — HEPARIN SODIUM 5000 UNIT(S): 5000 INJECTION INTRAVENOUS; SUBCUTANEOUS at 14:41

## 2019-09-22 RX ADMIN — Medication 1 DROP(S): at 06:02

## 2019-09-22 RX ADMIN — Medication 60 MILLIGRAM(S): at 17:47

## 2019-09-22 RX ADMIN — ALBUTEROL 2.5 MILLIGRAM(S): 90 AEROSOL, METERED ORAL at 19:53

## 2019-09-22 RX ADMIN — Medication 40 MILLIGRAM(S): at 06:01

## 2019-09-23 ENCOUNTER — TRANSCRIPTION ENCOUNTER (OUTPATIENT)
Age: 74
End: 2019-09-23

## 2019-09-23 VITALS
RESPIRATION RATE: 16 BRPM | DIASTOLIC BLOOD PRESSURE: 58 MMHG | OXYGEN SATURATION: 96 % | HEART RATE: 67 BPM | SYSTOLIC BLOOD PRESSURE: 106 MMHG

## 2019-09-23 PROCEDURE — 99239 HOSP IP/OBS DSCHRG MGMT >30: CPT

## 2019-09-23 RX ORDER — GLYCERIN 1 %
1 DROPS OPHTHALMIC (EYE)
Qty: 1 | Refills: 0
Start: 2019-09-23 | End: 2019-09-25

## 2019-09-23 RX ORDER — FLUTICASONE PROPIONATE 220 MCG
1 AEROSOL WITH ADAPTER (GRAM) INHALATION
Qty: 0 | Refills: 0 | DISCHARGE

## 2019-09-23 RX ORDER — FAMOTIDINE 10 MG/ML
1 INJECTION INTRAVENOUS
Qty: 0 | Refills: 0 | DISCHARGE

## 2019-09-23 RX ORDER — AMLODIPINE BESYLATE 2.5 MG/1
1 TABLET ORAL
Qty: 0 | Refills: 0 | DISCHARGE

## 2019-09-23 RX ORDER — BUDESONIDE AND FORMOTEROL FUMARATE DIHYDRATE 160; 4.5 UG/1; UG/1
2 AEROSOL RESPIRATORY (INHALATION)
Qty: 0 | Refills: 0 | DISCHARGE

## 2019-09-23 RX ORDER — FUROSEMIDE 40 MG
3 TABLET ORAL
Qty: 180 | Refills: 0
Start: 2019-09-23 | End: 2019-10-22

## 2019-09-23 RX ADMIN — Medication 650 MILLIGRAM(S): at 05:35

## 2019-09-23 RX ADMIN — FAMOTIDINE 40 MILLIGRAM(S): 10 INJECTION INTRAVENOUS at 11:11

## 2019-09-23 RX ADMIN — ALBUTEROL 2.5 MILLIGRAM(S): 90 AEROSOL, METERED ORAL at 13:27

## 2019-09-23 RX ADMIN — Medication 1 DROP(S): at 14:06

## 2019-09-23 RX ADMIN — Medication 81 MILLIGRAM(S): at 11:11

## 2019-09-23 RX ADMIN — FLUTICASONE FUROATE, UMECLIDINIUM BROMIDE AND VILANTEROL TRIFENATATE 2 PUFF(S): 200; 62.5; 25 POWDER RESPIRATORY (INHALATION) at 10:32

## 2019-09-23 RX ADMIN — QUETIAPINE FUMARATE 25 MILLIGRAM(S): 200 TABLET, FILM COATED ORAL at 11:11

## 2019-09-23 RX ADMIN — Medication 60 MILLIGRAM(S): at 05:26

## 2019-09-23 RX ADMIN — Medication 100 MILLIGRAM(S): at 05:26

## 2019-09-23 RX ADMIN — ESCITALOPRAM OXALATE 10 MILLIGRAM(S): 10 TABLET, FILM COATED ORAL at 11:11

## 2019-09-23 RX ADMIN — TRAMADOL HYDROCHLORIDE 50 MILLIGRAM(S): 50 TABLET ORAL at 10:28

## 2019-09-23 RX ADMIN — HEPARIN SODIUM 5000 UNIT(S): 5000 INJECTION INTRAVENOUS; SUBCUTANEOUS at 05:27

## 2019-09-23 RX ADMIN — GABAPENTIN 300 MILLIGRAM(S): 400 CAPSULE ORAL at 14:05

## 2019-09-23 RX ADMIN — GABAPENTIN 300 MILLIGRAM(S): 400 CAPSULE ORAL at 05:26

## 2019-09-23 RX ADMIN — TRAMADOL HYDROCHLORIDE 50 MILLIGRAM(S): 50 TABLET ORAL at 11:09

## 2019-09-23 RX ADMIN — HEPARIN SODIUM 5000 UNIT(S): 5000 INJECTION INTRAVENOUS; SUBCUTANEOUS at 14:05

## 2019-09-23 RX ADMIN — ALBUTEROL 2.5 MILLIGRAM(S): 90 AEROSOL, METERED ORAL at 08:31

## 2019-09-23 RX ADMIN — Medication 60 MILLIGRAM(S): at 18:03

## 2019-09-23 NOTE — PROGRESS NOTE ADULT - ASSESSMENT
Patient does not use o2 all time at home. No chest pain. No mi. No overt chf by exam now. Probably switch po lasix. Low dose beta. Probable out patient dobutamine SE. Daily weight
# SOB, likely due to acute on chronic diastolic CHF, r/o valvular problems. Aortic dissection ruled out.  - ECHO pending  - tele monitor for 24 hrs  - cardio eval  - continue IV Lasix for now  - daily weight, I&O, DASH diet  - US OBRIEN duplex  - check O2 on ambulation    # HTN   - BP fluctuates in /76 >> 93/58  - cont home meds, monitor BP    # COPD, emphysema, chronic respiratory failure  - no signs of exacerbation, cont home meds  - cont o2 supplement    # Viral conjunctivitis - will give Naphcon    DVT ppx .     #Progress Note Handoff  Pending clinical improvement  Consults: cardio  Tests: ECHO, US OBRIEN  Family Discussion: not needed  Future Disposition: home
# SOB, likely due to acute on chronic diastolic CHF, r/o valvular problems. Aortic dissection ruled out.  - ECHO still pending  - dc tele  - cardio eval appreciated, recommended OP stress ECHO  - switch to PO Lasix 60 mg BID  - daily weight, I&O, DASH diet  - US LE duplex negative  - check O2 on ambulation    # HTN   - BP fluctuates in /76 >> 93/58  - cont home meds, monitor BP  - compression stockings    # COPD, emphysema, chronic respiratory failure  - no signs of exacerbation, cont home meds  - cont o2 supplement    # Viral conjunctivitis - cont Naphcon    # Peripheral neuropathy - cont Gabapentin     DVT ppx .     #Progress Note Handoff  Pending clinical improvement  Consults:none  Tests: ECHO  Family Discussion: not needed  Future Disposition: home
# acute on chronic diastolic CHF, mod MR.  - pt is doing better  - cardio eval appreciated, recommended OP stress ECHO  - continue Lasix 60 mg BID PO  - daily weight, I&O, pt instructed how to do it at home, DASH diet  - US LE duplex negative    # HTN   - cont home meds, no changes to regimen made  - compression stockings    # COPD, emphysema, chronic respiratory failure  - no signs of exacerbation, cont home meds  - cont o2 supplement continuously, pt advised to use O2 on ambulation as well    # Viral conjunctivitis - cont Naphcon- A    # Peripheral neuropathy - cont Gabapentin     Pt is clinically stable for discharge home today with Op f/u with cardio early next week.

## 2019-09-23 NOTE — CONSULT NOTE ADULT - SUBJECTIVE AND OBJECTIVE BOX
HPI:  A 74 female with pmhx of TIA, emphysema, HNT, CHF, COPD on 4 L oxygen at home since last November, PVD presents to ED because could not breath. Pt reports around 2 pm went to check her mail box, when she returned in room could not breath. PT called her PCP and advices to come to ED. PT reports gets shortness of breath with not even a block. PT uses two pillow to sleep in a recliner. Pt denies chest pain at this time. PT denies cough. PT reports on her way to the hospital she started developing numbness and tingling on her left arm and her speech was getting slurry. Pt reports follows up with pulmonologist Dr. Reeves, last seen this week.  Pt reports now feels better . PT denies fever, chills, nausea, vomiting, burning with urination, diarrhea.     PTN  REFERRED TO ACUTE  REHAB  FOR  EVAL AND  TX   PAST MEDICAL & SURGICAL HISTORY:  Bipolar 1 disorder  Allergic reaction  PVD (peripheral vascular disease)  Other emphysema  Other cardiomyopathy  TIA (transient ischemic attack)  COPD (chronic obstructive pulmonary disease)  Depression  Hypertension  History of cholecystectomy      Hospital Course:    TODAY'S SUBJECTIVE & REVIEW OF SYMPTOMS:     Constitutional WNL   Cardio WNL   Resp WNL   GI WNL  Heme WNL  Endo WNL  Skin WNL  MSK WNL  Neuro WNL  Cognitive WNL  Psych WNL      MEDICATIONS  (STANDING):  ALBUTerol    0.083% 2.5 milliGRAM(s) Nebulizer every 6 hours  aspirin  chewable 81 milliGRAM(s) Oral daily  atorvastatin 20 milliGRAM(s) Oral at bedtime  escitalopram 10 milliGRAM(s) Oral daily  famotidine    Tablet 40 milliGRAM(s) Oral daily  fluticasone furoate/umeclidinium/vilanterol 100-62.5-25 MICROgram(s) Inhaler 2 Puff(s) Inhalation daily  furosemide    Tablet 60 milliGRAM(s) Oral two times a day  gabapentin 300 milliGRAM(s) Oral three times a day  heparin  Injectable 5000 Unit(s) SubCutaneous every 8 hours  influenza   Vaccine 0.5 milliLiter(s) IntraMuscular once  metoprolol tartrate 100 milliGRAM(s) Oral two times a day  naphazoline/pheniramine Solution 1 Drop(s) Both EYES three times a day  QUEtiapine 25 milliGRAM(s) Oral daily    MEDICATIONS  (PRN):  acetaminophen   Tablet .. 650 milliGRAM(s) Oral every 6 hours PRN Temp greater or equal to 38C (100.4F), Mild Pain (1 - 3)  LORazepam     Tablet 0.5 milliGRAM(s) Oral two times a day PRN Anxiety  nitroglycerin     SubLingual 0.4 milliGRAM(s) SubLingual every 5 minutes PRN Chest Pain  traMADol 50 milliGRAM(s) Oral every 8 hours PRN Moderate Pain (4 - 6)      FAMILY HISTORY:  No pertinent family history in first degree relatives      Allergies    codeine (Other (Moderate))  Depakote (Unknown)  losartan (Angioedema)  Risperdal (Other)  verapamil (Short breath; Angioedema)    Intolerances        SOCIAL HISTORY:    [  ] Etoh  [  ] Smoking  [  ] Substance abuse     Home Environment:  [  ] Home Alone  [x  ] Lives with Family  [  ] Home Health Aid    Dwelling:  [  ] Apartment  [ x ] Private House  [  ] Adult Home  [  ] Skilled Nursing Facility      [  ] Short Term  [  ] Long Term  [ x ] Stairs       Elevator [  ]    FUNCTIONAL STATUS PTA: (Check all that apply)  Ambulation: [ x  ]Independent    [  ] Dependent     [  ] Non-Ambulatory  Assistive Device: [  ] SA Cane  [  ]  Q Cane  [  ] Walker  [  ]  Wheelchair  ADL : [ x ] Independent  [  ]  Dependent       Vital Signs Last 24 Hrs  T(C): 35.9 (23 Sep 2019 05:59), Max: 37.3 (22 Sep 2019 14:06)  T(F): 96.6 (23 Sep 2019 05:59), Max: 99.2 (22 Sep 2019 14:06)  HR: 64 (23 Sep 2019 05:59) (64 - 83)  BP: 122/55 (23 Sep 2019 05:59) (97/54 - 133/58)  BP(mean): --  RR: 16 (23 Sep 2019 05:59) (16 - 16)  SpO2: 86% (22 Sep 2019 16:19) (86% - 86%)      PHYSICAL EXAM: Alert & Oriented X3  GENERAL: NAD, well-groomed, well-developed  HEAD:  Atraumatic, Normocephalic  EYES: EOMI, PERRLA, conjunctiva and sclera clear  NECK: Supple, No JVD, Normal thyroid  CHEST/LUNG: Clear to percussion bilaterally; No rales, rhonchi, wheezing, or rubs  HEART: Regular rate and rhythm; No murmurs, rubs, or gallops  ABDOMEN: Soft, Nontender, Nondistended; Bowel sounds present  EXTREMITIES:  2+ Peripheral Pulses, No clubbing, cyanosis, or edema    NERVOUS SYSTEM:  Cranial Nerves 2-12 intact [x  ] Abnormal  [  ]  ROM: WFL all extremities [x  ]  Abnormal [  ]  Motor Strength: WFL all extremities  [ x ]  Abnormal [  ]  Sensation: intact to light touch [ x ] Abnormal [  ]  Reflexes: Symmetric [  x]  Abnormal [  ]    FUNCTIONAL STATUS:  Bed Mobility: Independent [  ]  Supervision [ x ]  Needs Assistance [  ]  N/A [  ]  Transfers: Independent [  ]  Supervision [x  ]  Needs Assistance [  ]  N/A [  ]   Ambulation: Independent [  ]  Supervision [x  ]  Needs Assistance [  ]  N/A [  ]  ADL: Independent [  ] Requires Assistance [  ] N/A [ x ]  SEE PT/OT IE NOTES    LABS:    09-22    145  |  104  |  39<H>  ----------------------------<  92  3.9   |  29  |  0.9    Ca    9.6      22 Sep 2019 06:58  Mg     2.3     09-22            RADIOLOGY & ADDITIONAL STUDIES:< from: CT Head No Cont (09.20.19 @ 16:21) >  IMPRESSION:     No evidence of acute intracranial pathology. Stable exam since 5/31/2019.      < end of copied text >      Assesment:

## 2019-09-23 NOTE — DISCHARGE NOTE PROVIDER - CARE PROVIDERS DIRECT ADDRESSES
[email or web address]

## 2019-09-23 NOTE — PROGRESS NOTE ADULT - REASON FOR ADMISSION
shortness of breath, numbness left arm, pulmonary edema

## 2019-09-23 NOTE — CONSULT NOTE ADULT - ASSESSMENT
IMPRESSION: Rehab of 75 y/o  f  rehab  for  tia  debility      PRECAUTIONS: [  ] Cardiac  [  ] Respiratory  [  ] Seizures [  ] Contact Isolation  [  ] Droplet Isolation  [ FALL ] Other    Weight Bearing Status:     RECOMMENDATION:    Out of Bed to Chair     DVT/Decubiti Prophylaxis    REHAB PLAN:     [ xx  ] Bedside P/T 3-5 times a week   [   ]   Bedside O/T  2-3 times a week             [   ] No Rehab Therapy Indicated                   [   ]  Speech Therapy   Conditioning/ROM                                    ADL  Bed Mobility                                               Conditioning/ROM  Transfers                                                     Bed Mobility  Sitting /Standing Balance                         Transfers                                        Gait Training                                               Sitting/Standing Balance  Stair Training [   ]Applicable                    Home equipment Eval                                                                        Splinting  [   ] Only      GOALS:   ADL   [ x  ]   Independent                    Transfers  [x   ] Independent                          Ambulation  [ x  ] Independent     [    ] With device                            [   x]  CG                                                         [   x]  CG                                                                  [   ] CG                            [    ] Min A                                                   [   ] Min A                                                              [   ] Min  A          DISCHARGE PLAN:   [   ]  Good candidate for Intensive Rehabilitation/Hospital based-4A [facility]                                             Will tolerate 3hrs Intensive Rehab Daily                                       [    ]  Short Term Rehab in Skilled Nursing Facility                                       [   xx ]  Home with Outpatient or VN services                                         [    ]  Possible Candidate for Intensive Hospital based Rehab

## 2019-09-23 NOTE — PROGRESS NOTE ADULT - SUBJECTIVE AND OBJECTIVE BOX
CARDIOLOGY CONSULT NOTE     CHIEF COMPLAINT/REASON FOR CONSULT:    HPI:  A 74 female with pmhx of TIA, emphysema, HNT, CHF, COPD on 4 L oxygen at home since last November, PVD presents to ED because could not breath. Pt reports around 2 pm went to check her mail box, when she returned in room could not breath. PT called her PCP and advices to come to ED. PT reports gets shortness of breath with not even a block. PT uses two pillow to sleep in a recliner. Pt denies chest pain at this time. PT denies cough. PT reports on her way to the hospital she started developing numbness and tingling on her left arm and her speech was getting slurry. Pt reports follows up with pulmonologist Dr. Reeves, last seen this week.  Pt reports now feels better . PT denies fever, chills, nausea, vomiting, burning with urination, diarrhea. (20 Sep 2019 18:33)      PAST MEDICAL & SURGICAL HISTORY:  Bipolar 1 disorder  Allergic reaction  PVD (peripheral vascular disease)  Other emphysema  Other cardiomyopathy  TIA (transient ischemic attack)  COPD (chronic obstructive pulmonary disease)  Depression  Hypertension  History of cholecystectomy      Cardiac Risks:   [x]HTN, [ ] DM, [ ] Smoking, [x ] FH,  [x ] Lipids        MEDICATIONS:  MEDICATIONS  (STANDING):  ALBUTerol    0.083% 2.5 milliGRAM(s) Nebulizer every 6 hours  amLODIPine   Tablet 2.5 milliGRAM(s) Oral daily  aspirin  chewable 81 milliGRAM(s) Oral daily  atorvastatin 20 milliGRAM(s) Oral at bedtime  escitalopram 10 milliGRAM(s) Oral daily  famotidine    Tablet 40 milliGRAM(s) Oral daily  fluticasone furoate/umeclidinium/vilanterol 100-62.5-25 MICROgram(s) Inhaler 2 Puff(s) Inhalation daily  furosemide   Injectable 40 milliGRAM(s) IV Push two times a day  gabapentin 300 milliGRAM(s) Oral three times a day  heparin  Injectable 5000 Unit(s) SubCutaneous every 8 hours  influenza   Vaccine 0.5 milliLiter(s) IntraMuscular once  metoprolol tartrate 100 milliGRAM(s) Oral two times a day  naphazoline/pheniramine Solution 1 Drop(s) Both EYES three times a day  QUEtiapine 25 milliGRAM(s) Oral daily      FAMILY HISTORY:  No pertinent family history in first degree relatives      SOCIAL HISTORY:      [ ] Marital status   Allergies    codeine (Other (Moderate))  Depakote (Unknown)  losartan (Angioedema)  Risperdal (Other)  verapamil (Short breath; Angioedema)    Intolerances    	    REVIEW OF SYSTEMS:  CONSTITUTIONAL: No fever, weight loss, or fatigue  EYES: No eye pain, visual disturbances, or discharge  ENMT:  No difficulty hearing, tinnitus, vertigo; No sinus or throat pain  NECK: No pain or stiffness  RESPIRATORY: No cough, wheezing, chills or hemoptysis; No Shortness of Breath  CARDIOVASCULAR: No chest pain, palpitations, passing out, dizziness, or leg swelling  GASTROINTESTINAL: No abdominal or epigastric pain. No nausea, vomiting, or hematemesis; No diarrhea or constipation. No melena or hematochezia.  GENITOURINARY: No dysuria, frequency, hematuria, or incontinence  NEUROLOGICAL: No headaches, memory loss, loss of strength, numbness, or tremors  SKIN: No itching, burning, rashes, or lesions   	      PHYSICAL EXAM:  T(C): 37 (09-21-19 @ 05:51), Max: 37.2 (09-20-19 @ 21:29)  HR: 92 (09-21-19 @ 05:51) (76 - 92)  BP: 126/58 (09-21-19 @ 05:51) (93/58 - 171/76)  RR: 16 (09-21-19 @ 09:10) (16 - 20)  SpO2: 97% (09-21-19 @ 09:10) (95% - 97%)  Wt(kg): --  I&O's Summary    21 Sep 2019 07:01  -  21 Sep 2019 10:31  --------------------------------------------------------  IN: 240 mL / OUT: 0 mL / NET: 240 mL        Appearance: Normal	  Psychiatry: A & O x 3, Mood & affect appropriate  HEENT:   Normal oral mucosa, PERRL, EOMI	  Lymphatic: No lymphadenopathy  Cardiovascular: Normal S1 S2,RRR, No JVD, No murmurs  Respiratory: Lungs clear to auscultation	  Gastrointestinal:  Soft, Non-tender, + BS	  Skin: No rashes, No ecchymoses, No cyanosis	  Neurologic: Non-focal  Extremities: Normal range of motion, No clubbing, cyanosis or edema  Vascular: Peripheral pulses palpable 2+ bilaterally      ECG:  < from: 12 Lead ECG (09.20.19 @ 15:54) >  Diagnosis Line Normal sinus rhythm  Possible Left atrial enlargement    Confirmed by VIVIENNE HAQUE MD ([id number]) on 9/20/2019 4:36:20 PM    < end of copied text >  	    	  LABS:	 	    CARDIAC MARKERS:          Serum Pro-Brain Natriuretic Peptide: 589 pg/mL (09-20 @ 15:30)                            13.0   7.21  )-----------( 254      ( 21 Sep 2019 07:17 )             40.2     09-21    142  |  101  |  37<H>  ----------------------------<  99  3.8   |  30  |  0.9    Ca    9.6      21 Sep 2019 07:17  Mg     2.0     09-20    TPro  6.6  /  Alb  4.1  /  TBili  <0.2  /  DBili  x   /  AST  30  /  ALT  20  /  AlkPhos  168<H>  09-20    PT/INR - ( 20 Sep 2019 15:30 )   PT: 11.70 sec;   INR: 1.02 ratio         PTT - ( 20 Sep 2019 15:30 )  PTT:32.2 sec  proBNP: Serum Pro-Brain Natriuretic Peptide: 589 pg/mL (09-20 @ 15:30)    Lipid Profile:   HgA1c:   TSH:     IMP/PLAN:
SHAUN COATES    Patient is a 74y old  Female who presents with a chief complaint of shortness of breath, numbness left arm, pulmonary edema (20 Sep 2019 18:33)    HPI:  A 74 female with pmhx of TIA, emphysema, HNT, CHF, COPD on 4 L oxygen at home since last November, PVD presents to ED because could not breath. Pt reports around 2 pm went to check her mail box, when she returned in room could not breath. PT called her PCP and advices to come to ED. PT reports gets shortness of breath with not even a block. PT uses two pillow to sleep in a recliner. Pt denies chest pain at this time. PT denies cough. PT reports on her way to the hospital she started developing numbness and tingling on her left arm and her speech was getting slurry. Pt reports follows up with pulmonologist Dr. Reeves, last seen this week.  Pt reports now feels better . PT denies fever, chills, nausea, vomiting, burning with urination, diarrhea. (20 Sep 2019 18:33)    INTERVAL HPI/OVERNIGHT EVENTS: pt feels better, SOB resolving, no events on telemetry    CONSTITUTIONAL: No weakness, fevers or chills  EYES/ENT: No visual changes; watery discharge from both eyes. No vertigo or throat pain   NECK: No pain or stiffness  RESPIRATORY: No cough, wheezing, hemoptysis; + shortness of breath  CARDIOVASCULAR: No chest pain or palpitations  GASTROINTESTINAL: No abdominal or epigastric pain. No nausea, vomiting, or hematemesis; No diarrhea or constipation. No melena or hematochezia.  GENITOURINARY: No dysuria, frequency or hematuria  NEUROLOGICAL: No numbness or weakness  SKIN: No itching, rashes     PHYSICAL EXAM:  T(C): 37, Max: 37.2 (09-20-19 @ 21:29)  HR: 92 (76 - 92)  BP: 126/58 (93/58 - 171/76)  RR: 16 (16 - 20)  SpO2: 97% (95% - 97%)    GENERAL: NAD  PULMONARY/CHEST: No rales, rhonchi, wheezing  CARDIOVASC: Regular rate and rhythm; systolic murmur  GI/ABDOMEN: Soft, Nontender, Nondistended; Bowel sounds present  EXTREMITIES:  2+ Peripheral Pulses, No clubbing, cyanosis, or edema, no deformity. No calf tenderness b/l.  NERVOUS SYSTEM:  Alert & Oriented X3, no focal deficit     LABS:                        13.0   7.21  )-----------( 254      ( 21 Sep 2019 07:17 )             40.2     09-21    142  |  101  |  37<H>  ----------------------------<  99  3.8   |  30  |  0.9    Ca    9.6      21 Sep 2019 07:17    Troponin T, Serum: <0.01 ng/mL (09-21-19 @ 07:17)  Troponin T, Serum: <0.01 ng/mL (09-20-19 @ 15:30)    Serum Pro-Brain Natriuretic Peptide: 589 pg/mL (09.20.19 @ 15:30)    CTA chest: No evidence of aortic dissection or intramural hematoma.  Interstitial pulmonary edema.    MEDICATIONS  (STANDING):  ALBUTerol    0.083% 2.5 milliGRAM(s) Nebulizer every 6 hours  amLODIPine   Tablet 2.5 milliGRAM(s) Oral daily  aspirin  chewable 81 milliGRAM(s) Oral daily  atorvastatin 20 milliGRAM(s) Oral at bedtime  escitalopram 10 milliGRAM(s) Oral daily  famotidine    Tablet 40 milliGRAM(s) Oral daily  fluticasone furoate/umeclidinium/vilanterol 100-62.5-25 MICROgram(s) Inhaler 2 Puff(s) Inhalation daily  furosemide   Injectable 40 milliGRAM(s) IV Push two times a day  gabapentin 300 milliGRAM(s) Oral three times a day  heparin  Injectable 5000 Unit(s) SubCutaneous every 8 hours  influenza   Vaccine 0.5 milliLiter(s) IntraMuscular once  metoprolol tartrate 100 milliGRAM(s) Oral two times a day  QUEtiapine 25 milliGRAM(s) Oral daily    MEDICATIONS  (PRN):  acetaminophen   Tablet .. 650 milliGRAM(s) Oral every 6 hours PRN Temp greater or equal to 38C (100.4F), Mild Pain (1 - 3)  LORazepam     Tablet 0.5 milliGRAM(s) Oral two times a day PRN Anxiety  nitroglycerin     SubLingual 0.4 milliGRAM(s) SubLingual every 5 minutes PRN Chest Pain
SHAUN COATES    Patient is a 74y old  Female who presents with a chief complaint of shortness of breath, numbness left arm, pulmonary edema (21 Sep 2019 10:28)    INTERVAL HPI/OVERNIGHT EVENTS: pt feels better.     ROS: All ROS negative except chronic numbness in feet b/l    PHYSICAL EXAM:  T(C): 35.7, Max: 38.2 (09-21-19 @ 14:00)  HR: 88 (75 - 88)  BP: 125/60 (106/50 - 125/60)  RR: 16 (16 - 16)  SpO2: --    GENERAL: NAD  PULMONARY/CHEST: No rales, rhonchi, wheezing  CARDIOVASC: Regular rate and rhythm  GI/ABDOMEN: Soft, Nontender, Nondistended; Bowel sounds present  EXTREMITIES:  2+ Peripheral Pulses, No clubbing, cyanosis, or edema, no deformity. No calf tenderness b/l.  NERVOUS SYSTEM:  Alert & Oriented X3, no focal deficit     Consultant(s) Notes Reviewed by me.     LABS:                        13.0   7.21  )-----------( 254      ( 21 Sep 2019 07:17 )             40.2     09-22    145  |  104  |  39<H>  ----------------------------<  92  3.9   |  29  |  0.9    Ca    9.6      22 Sep 2019 06:58  Mg     2.3     09-22    TPro  6.6  /  Alb  4.1  /  TBili  <0.2  /  DBili  x   /  AST  30  /  ALT  20  /  AlkPhos  168<H>  09-20    PT/INR - ( 20 Sep 2019 15:30 )   PT: 11.70 sec;   INR: 1.02 ratio         PTT - ( 20 Sep 2019 15:30 )  PTT:32.2 sec      MEDICATIONS  (STANDING):  ALBUTerol    0.083% 2.5 milliGRAM(s) Nebulizer every 6 hours  aspirin  chewable 81 milliGRAM(s) Oral daily  atorvastatin 20 milliGRAM(s) Oral at bedtime  escitalopram 10 milliGRAM(s) Oral daily  famotidine    Tablet 40 milliGRAM(s) Oral daily  fluticasone furoate/umeclidinium/vilanterol 100-62.5-25 MICROgram(s) Inhaler 2 Puff(s) Inhalation daily  furosemide    Tablet 60 milliGRAM(s) Oral two times a day  gabapentin 300 milliGRAM(s) Oral three times a day  heparin  Injectable 5000 Unit(s) SubCutaneous every 8 hours  influenza   Vaccine 0.5 milliLiter(s) IntraMuscular once  metoprolol tartrate 100 milliGRAM(s) Oral two times a day  naphazoline/pheniramine Solution 1 Drop(s) Both EYES three times a day  QUEtiapine 25 milliGRAM(s) Oral daily    MEDICATIONS  (PRN):  acetaminophen   Tablet .. 650 milliGRAM(s) Oral every 6 hours PRN Temp greater or equal to 38C (100.4F), Mild Pain (1 - 3)  LORazepam     Tablet 0.5 milliGRAM(s) Oral two times a day PRN Anxiety  nitroglycerin     SubLingual 0.4 milliGRAM(s) SubLingual every 5 minutes PRN Chest Pain  traMADol 50 milliGRAM(s) Oral every 8 hours PRN Moderate Pain (4 - 6)
SHAUN COATES    Patient is a 74y old  Female who presents with a chief complaint of shortness of breath, numbness left arm, pulmonary edema (23 Sep 2019 09:43)    HPI:  A 74 female with pmhx of TIA, emphysema, HNT, CHF, COPD on 4 L oxygen at home since last November, PVD presents to ED because could not breath. Pt reports around 2 pm went to check her mail box, when she returned in room could not breath. PT called her PCP and advices to come to ED. PT reports gets shortness of breath with not even a block. PT uses two pillow to sleep in a recliner. Pt denies chest pain at this time. PT denies cough. PT reports on her way to the hospital she started developing numbness and tingling on her left arm and her speech was getting slurry. Pt reports follows up with pulmonologist Dr. Reeves, last seen this week.  Pt reports now feels better . PT denies fever, chills, nausea, vomiting, burning with urination, diarrhea. (20 Sep 2019 18:33)    INTERVAL HPI/OVERNIGHT EVENTS: no events, pt feels back to regular state of health. Reported by nurse pt desaturated on ambulation to 86% on RA.     ROS: All ROS negative except eyes itching    PHYSICAL EXAM:  T(C): 37.1, Max: 37.1 (09-23-19 @ 14:17)  HR: 65 (64 - 83)  BP: 107/51 (97/54 - 133/58)  RR: 16 (16 - 16)  SpO2: 86% (86% - 86%)    GENERAL: NAD  HEENT: PERRL, watery eyes with some crust, conjunctiva erythematous   NECK: Supple, No JVD  PULMONARY/CHEST: No rales, rhonchi, wheezing or crackles   CARDIOVASC: Regular rate and rhythm; No murmurs  GI/ABDOMEN: Soft, Nontender, Nondistended; Bowel sounds present  EXTREMITIES:  2+ Peripheral Pulses, No clubbing, cyanosis, or edema, no deformity. No calf tenderness b/l.  NERVOUS SYSTEM:  Alert & Oriented X3, no focal deficit     LABS:    09-22    145  |  104  |  39<H>  ----------------------------<  92  3.9   |  29  |  0.9    Ca    9.6      22 Sep 2019 06:58  Mg     2.3     09-22    ECHO prelim: grade 2 diastolic dysfunction    MEDICATIONS  (STANDING):  ALBUTerol    0.083% 2.5 milliGRAM(s) Nebulizer every 6 hours  aspirin  chewable 81 milliGRAM(s) Oral daily  atorvastatin 20 milliGRAM(s) Oral at bedtime  escitalopram 10 milliGRAM(s) Oral daily  famotidine    Tablet 40 milliGRAM(s) Oral daily  fluticasone furoate/umeclidinium/vilanterol 100-62.5-25 MICROgram(s) Inhaler 2 Puff(s) Inhalation daily  furosemide    Tablet 60 milliGRAM(s) Oral two times a day  gabapentin 300 milliGRAM(s) Oral three times a day  heparin  Injectable 5000 Unit(s) SubCutaneous every 8 hours  influenza   Vaccine 0.5 milliLiter(s) IntraMuscular once  metoprolol tartrate 100 milliGRAM(s) Oral two times a day  naphazoline/pheniramine Solution 1 Drop(s) Both EYES three times a day  QUEtiapine 25 milliGRAM(s) Oral daily    MEDICATIONS  (PRN):  acetaminophen   Tablet .. 650 milliGRAM(s) Oral every 6 hours PRN Temp greater or equal to 38C (100.4F), Mild Pain (1 - 3)  LORazepam     Tablet 0.5 milliGRAM(s) Oral two times a day PRN Anxiety  nitroglycerin     SubLingual 0.4 milliGRAM(s) SubLingual every 5 minutes PRN Chest Pain  traMADol 50 milliGRAM(s) Oral every 8 hours PRN Moderate Pain (4 - 6)

## 2019-09-23 NOTE — DISCHARGE NOTE NURSING/CASE MANAGEMENT/SOCIAL WORK - PATIENT PORTAL LINK FT
You can access the FollowMyHealth Patient Portal offered by [facility] by registering at the following website: [email or web address]. By joining [facility]’s FollowMyHealth portal, you will also be able to view your health information using other applications (apps) compatible with our system.

## 2019-09-23 NOTE — DISCHARGE NOTE PROVIDER - NSDCCPCAREPLAN_GEN_ALL_CORE_FT
PRINCIPAL DISCHARGE DIAGNOSIS  Diagnosis: Acute on chronic diastolic heart failure  Assessment and Plan of Treatment: Continue lasix 2 times a day, monitor fluid intake and urinatry output, daily weight and close f/u with cardiologist  and PMD.

## 2019-09-23 NOTE — DISCHARGE NOTE PROVIDER - CARE PROVIDER_API CALL
Carl Bishop)  Cardiovascular Disease; Internal Medicine  [address] [place] [address]  Phone: [phone]  Fax: [phone]  Follow Up Time:     Milad Aguilar)  Family Medicine  [address] [place] [address]  Phone: [phone]  Fax: [phone]  Follow Up Time:

## 2019-09-23 NOTE — DISCHARGE NOTE PROVIDER - HOSPITAL COURSE
74 female with pmhx of TIA, emphysema, HNT, CHF, COPD with chronic respiratory failure on 4 L oxygen at home since last November, PVD presents to ED due to SOB. Pt had gradual worsening of her symptoms, her PMD increased dose of Lasix to 80 BID but pt did not improve. Initial work up ruled out aortic dissection, ACS. Pt was admitted under impression of acute on chronic diastolic CHF exacerbation. She was seen by cardio with recommendations cont diuretics and OP f/u for possible stress ECHO. Pt improved on IV Lasix. She noted to be hypoxic on ambulation and advised to use O2 continuously.       Pt is clinically stable for discharge home today.

## 2019-09-24 RX ORDER — BUDESONIDE AND FORMOTEROL FUMARATE DIHYDRATE 80; 4.5 UG/1; UG/1
80-4.5 AEROSOL RESPIRATORY (INHALATION)
Refills: 0 | Status: DISCONTINUED | COMMUNITY
Start: 2019-08-22 | End: 2019-09-24

## 2019-09-24 RX ORDER — FUROSEMIDE 40 MG/1
40 TABLET ORAL DAILY
Refills: 0 | Status: DISCONTINUED | COMMUNITY
Start: 2019-08-22 | End: 2019-09-24

## 2019-09-25 ENCOUNTER — APPOINTMENT (OUTPATIENT)
Dept: ORTHOPEDIC SURGERY | Facility: CLINIC | Age: 74
End: 2019-09-25

## 2019-09-26 ENCOUNTER — APPOINTMENT (OUTPATIENT)
Dept: CARE COORDINATION | Facility: HOME HEALTH | Age: 74
End: 2019-09-26
Payer: MEDICARE

## 2019-09-26 VITALS
RESPIRATION RATE: 18 BRPM | SYSTOLIC BLOOD PRESSURE: 120 MMHG | DIASTOLIC BLOOD PRESSURE: 70 MMHG | WEIGHT: 216.05 LBS | HEART RATE: 66 BPM | OXYGEN SATURATION: 97 %

## 2019-09-26 DIAGNOSIS — J43.9 EMPHYSEMA, UNSPECIFIED: ICD-10-CM

## 2019-09-26 DIAGNOSIS — R20.0 ANESTHESIA OF SKIN: ICD-10-CM

## 2019-09-26 DIAGNOSIS — I34.0 NONRHEUMATIC MITRAL (VALVE) INSUFFICIENCY: ICD-10-CM

## 2019-09-26 DIAGNOSIS — Z86.73 PERSONAL HISTORY OF TRANSIENT ISCHEMIC ATTACK (TIA), AND CEREBRAL INFARCTION WITHOUT RESIDUAL DEFICITS: ICD-10-CM

## 2019-09-26 DIAGNOSIS — Z87.891 PERSONAL HISTORY OF NICOTINE DEPENDENCE: ICD-10-CM

## 2019-09-26 DIAGNOSIS — I50.33 ACUTE ON CHRONIC DIASTOLIC (CONGESTIVE) HEART FAILURE: ICD-10-CM

## 2019-09-26 DIAGNOSIS — R53.81 OTHER MALAISE: ICD-10-CM

## 2019-09-26 DIAGNOSIS — I73.9 PERIPHERAL VASCULAR DISEASE, UNSPECIFIED: ICD-10-CM

## 2019-09-26 DIAGNOSIS — J96.10 CHRONIC RESPIRATORY FAILURE, UNSPECIFIED WHETHER WITH HYPOXIA OR HYPERCAPNIA: ICD-10-CM

## 2019-09-26 DIAGNOSIS — R06.02 SHORTNESS OF BREATH: ICD-10-CM

## 2019-09-26 DIAGNOSIS — I11.0 HYPERTENSIVE HEART DISEASE WITH HEART FAILURE: ICD-10-CM

## 2019-09-26 DIAGNOSIS — B30.9 VIRAL CONJUNCTIVITIS, UNSPECIFIED: ICD-10-CM

## 2019-09-26 DIAGNOSIS — F31.9 BIPOLAR DISORDER, UNSPECIFIED: ICD-10-CM

## 2019-09-26 DIAGNOSIS — J30.2 OTHER SEASONAL ALLERGIC RHINITIS: ICD-10-CM

## 2019-09-26 PROCEDURE — 99496 TRANSJ CARE MGMT HIGH F2F 7D: CPT

## 2019-09-26 RX ORDER — AMLODIPINE BESYLATE 2.5 MG/1
2.5 TABLET ORAL DAILY
Refills: 0 | Status: DISCONTINUED | COMMUNITY
Start: 2019-08-22 | End: 2019-09-26

## 2019-09-26 NOTE — PLAN
[FreeTextEntry1] : CHF- c/w Lasix , BB, daily weights , low salt diet , fluid restrictions, discussed in detail, compression stockings, cardiology follow up scheduled \par COPD- c/w 02 supplementation nebulizer treatment as needed,  trelegy Mayo , pulmonary follow up\par HTN- c/w home medications\par SA- c/w singulair / flonase prn \par c/w current home medication regimen  \par c/w pulmonary/ cardiology / pcp follow up

## 2019-09-26 NOTE — COUNSELING
[de-identified] : Pt was informed about CN’s role/ STARS program and overview of transitional care reviewed with patient. In addition, yellow contact card was provided. Pt educated on topics of importance such as compliance with all provider visits, prescribed medication regimen, and low salt diet. Pt encouraged calling CN with any issues, concerns or questions, also educated to notify CN if experiencing CP, SOB , cough, increased mucus/phlegm production, abdominal discomfort/swelling, difficulty sleeping or lying flat, fever, chills, fatigue, weight gain of 2-3lbs in 24 hours or 5lbs in one week,  dizziness, lightheadedness, n/v/d/c, swelling to extremities and/or any signs of CHF exacerbation as reviewed. Reassurance provided. Will continue to monitor\par \par

## 2019-09-26 NOTE — PHYSICAL EXAM
[No Acute Distress] : no acute distress [Well Developed] : well developed [Normal Sclera/Conjunctiva] : normal sclera/conjunctiva [PERRL] : pupils equal round and reactive to light [No JVD] : no jugular venous distention [Supple] : supple [No Respiratory Distress] : no respiratory distress  [Clear to Auscultation] : lungs were clear to auscultation bilaterally [No Accessory Muscle Use] : no accessory muscle use [Normal Rate] : normal rate  [Regular Rhythm] : with a regular rhythm [Pedal Pulses Present] : the pedal pulses are present [Normal S1, S2] : normal S1 and S2 [Soft] : abdomen soft [Non Tender] : non-tender [Non-distended] : non-distended [Normal] : no rash [Normal Affect] : the affect was normal [No Focal Deficits] : no focal deficits [Alert and Oriented x3] : oriented to person, place, and time [Normal Insight/Judgement] : insight and judgment were intact [de-identified] : trace edema [de-identified] : uses walker

## 2019-09-26 NOTE — HISTORY OF PRESENT ILLNESS
[FreeTextEntry1] : Patient s/p recent discharge from [facility] for CHF exacerbation. patient is temporarily unable to leave home as it requires a considerable and taxing effort \par  [de-identified] : Patient is 74 female enrolled in the STARS program seen at home with a PMH of TIA,, HTN, CHF, COPD with chronic respiratory failure on home  oxygen, PVD presented  to ED due to SOB s/p PMD s/p PCP increasing diuretic , patient was admitted from 9/20/19 to 9/23/19 for CHF exacerbation . Patient was contacted by NP from [facility] and medication reconciliation was done with in 48 hours of discharge\par  Patient was found stable by the inpatient team and d/c home with [facility]NA . On arrival to home patient is a0x3 denies c/p, SOB, cough, wt gain or BLLE swelling.

## 2019-09-29 ENCOUNTER — INPATIENT (INPATIENT)
Facility: HOSPITAL | Age: 74
LOS: 3 days | Discharge: HOME HEALTH | End: 2019-10-03
Attending: INTERNAL MEDICINE | Admitting: INTERNAL MEDICINE
Payer: MEDICARE

## 2019-09-29 VITALS
RESPIRATION RATE: 18 BRPM | OXYGEN SATURATION: 98 % | WEIGHT: 197.98 LBS | DIASTOLIC BLOOD PRESSURE: 56 MMHG | HEART RATE: 64 BPM | TEMPERATURE: 98 F | HEIGHT: 62 IN | SYSTOLIC BLOOD PRESSURE: 114 MMHG

## 2019-09-29 DIAGNOSIS — Z90.49 ACQUIRED ABSENCE OF OTHER SPECIFIED PARTS OF DIGESTIVE TRACT: Chronic | ICD-10-CM

## 2019-09-29 LAB
ALBUMIN SERPL ELPH-MCNC: 4.2 G/DL — SIGNIFICANT CHANGE UP (ref 3.5–5.2)
ALP SERPL-CCNC: 161 U/L — HIGH (ref 30–115)
ALT FLD-CCNC: 24 U/L — SIGNIFICANT CHANGE UP (ref 0–41)
ANION GAP SERPL CALC-SCNC: 14 MMOL/L — SIGNIFICANT CHANGE UP (ref 7–14)
AST SERPL-CCNC: 32 U/L — SIGNIFICANT CHANGE UP (ref 0–41)
BASE EXCESS BLDV CALC-SCNC: 1.2 MMOL/L — SIGNIFICANT CHANGE UP (ref -2–2)
BASOPHILS # BLD AUTO: 0.05 K/UL — SIGNIFICANT CHANGE UP (ref 0–0.2)
BASOPHILS NFR BLD AUTO: 0.9 % — SIGNIFICANT CHANGE UP (ref 0–1)
BILIRUB SERPL-MCNC: <0.2 MG/DL — SIGNIFICANT CHANGE UP (ref 0.2–1.2)
BUN SERPL-MCNC: 49 MG/DL — HIGH (ref 10–20)
CA-I SERPL-SCNC: 1.18 MMOL/L — SIGNIFICANT CHANGE UP (ref 1.12–1.3)
CALCIUM SERPL-MCNC: 9.4 MG/DL — SIGNIFICANT CHANGE UP (ref 8.5–10.1)
CHLORIDE SERPL-SCNC: 102 MMOL/L — SIGNIFICANT CHANGE UP (ref 98–110)
CO2 SERPL-SCNC: 24 MMOL/L — SIGNIFICANT CHANGE UP (ref 17–32)
CREAT SERPL-MCNC: 1.2 MG/DL — SIGNIFICANT CHANGE UP (ref 0.7–1.5)
EOSINOPHIL # BLD AUTO: 0.39 K/UL — SIGNIFICANT CHANGE UP (ref 0–0.7)
EOSINOPHIL NFR BLD AUTO: 7.1 % — SIGNIFICANT CHANGE UP (ref 0–8)
GAS PNL BLDV: 141 MMOL/L — SIGNIFICANT CHANGE UP (ref 136–145)
GAS PNL BLDV: SIGNIFICANT CHANGE UP
GLUCOSE SERPL-MCNC: 134 MG/DL — HIGH (ref 70–99)
HCO3 BLDV-SCNC: 28 MMOL/L — SIGNIFICANT CHANGE UP (ref 22–29)
HCT VFR BLD CALC: 38.7 % — SIGNIFICANT CHANGE UP (ref 37–47)
HCT VFR BLDA CALC: 55.2 % — HIGH (ref 34–44)
HGB BLD CALC-MCNC: 18 G/DL — SIGNIFICANT CHANGE UP (ref 14–18)
HGB BLD-MCNC: 12.2 G/DL — SIGNIFICANT CHANGE UP (ref 12–16)
HOROWITZ INDEX BLDV+IHG-RTO: 21 — SIGNIFICANT CHANGE UP
IMM GRANULOCYTES NFR BLD AUTO: 0.2 % — SIGNIFICANT CHANGE UP (ref 0.1–0.3)
LACTATE BLDV-MCNC: 1.6 MMOL/L — SIGNIFICANT CHANGE UP (ref 0.5–1.6)
LYMPHOCYTES # BLD AUTO: 1.35 K/UL — SIGNIFICANT CHANGE UP (ref 1.2–3.4)
LYMPHOCYTES # BLD AUTO: 24.5 % — SIGNIFICANT CHANGE UP (ref 20.5–51.1)
MCHC RBC-ENTMCNC: 30.7 PG — SIGNIFICANT CHANGE UP (ref 27–31)
MCHC RBC-ENTMCNC: 31.5 G/DL — LOW (ref 32–37)
MCV RBC AUTO: 97.2 FL — SIGNIFICANT CHANGE UP (ref 81–99)
MONOCYTES # BLD AUTO: 0.65 K/UL — HIGH (ref 0.1–0.6)
MONOCYTES NFR BLD AUTO: 11.8 % — HIGH (ref 1.7–9.3)
NEUTROPHILS # BLD AUTO: 3.06 K/UL — SIGNIFICANT CHANGE UP (ref 1.4–6.5)
NEUTROPHILS NFR BLD AUTO: 55.5 % — SIGNIFICANT CHANGE UP (ref 42.2–75.2)
NRBC # BLD: 0 /100 WBCS — SIGNIFICANT CHANGE UP (ref 0–0)
NT-PROBNP SERPL-SCNC: 402 PG/ML — HIGH (ref 0–300)
PCO2 BLDV: 48 MMHG — SIGNIFICANT CHANGE UP (ref 41–51)
PH BLDV: 7.37 — SIGNIFICANT CHANGE UP (ref 7.26–7.43)
PLATELET # BLD AUTO: 242 K/UL — SIGNIFICANT CHANGE UP (ref 130–400)
PO2 BLDV: 63 MMHG — HIGH (ref 20–40)
POTASSIUM BLDV-SCNC: 4.2 MMOL/L — SIGNIFICANT CHANGE UP (ref 3.3–5.6)
POTASSIUM SERPL-MCNC: 3.7 MMOL/L — SIGNIFICANT CHANGE UP (ref 3.5–5)
POTASSIUM SERPL-SCNC: 3.7 MMOL/L — SIGNIFICANT CHANGE UP (ref 3.5–5)
PROT SERPL-MCNC: 6.9 G/DL — SIGNIFICANT CHANGE UP (ref 6–8)
RBC # BLD: 3.98 M/UL — LOW (ref 4.2–5.4)
RBC # FLD: 15.2 % — HIGH (ref 11.5–14.5)
SAO2 % BLDV: 91 % — SIGNIFICANT CHANGE UP
SODIUM SERPL-SCNC: 140 MMOL/L — SIGNIFICANT CHANGE UP (ref 135–146)
TROPONIN T SERPL-MCNC: <0.01 NG/ML — SIGNIFICANT CHANGE UP
WBC # BLD: 5.51 K/UL — SIGNIFICANT CHANGE UP (ref 4.8–10.8)
WBC # FLD AUTO: 5.51 K/UL — SIGNIFICANT CHANGE UP (ref 4.8–10.8)

## 2019-09-29 PROCEDURE — 99291 CRITICAL CARE FIRST HOUR: CPT

## 2019-09-29 PROCEDURE — 99222 1ST HOSP IP/OBS MODERATE 55: CPT

## 2019-09-29 PROCEDURE — 71045 X-RAY EXAM CHEST 1 VIEW: CPT | Mod: 26

## 2019-09-29 RX ORDER — GABAPENTIN 400 MG/1
300 CAPSULE ORAL THREE TIMES A DAY
Refills: 0 | Status: DISCONTINUED | OUTPATIENT
Start: 2019-09-29 | End: 2019-10-03

## 2019-09-29 RX ORDER — OXCARBAZEPINE 300 MG/1
0 TABLET, FILM COATED ORAL
Qty: 0 | Refills: 0 | DISCHARGE

## 2019-09-29 RX ORDER — DIPHENHYDRAMINE HCL 50 MG
50 CAPSULE ORAL EVERY 8 HOURS
Refills: 0 | Status: DISCONTINUED | OUTPATIENT
Start: 2019-09-29 | End: 2019-10-03

## 2019-09-29 RX ORDER — FLUTICASONE PROPIONATE 50 MCG
50 SPRAY, SUSPENSION NASAL
Refills: 0 | Status: DISCONTINUED | OUTPATIENT
Start: 2019-09-29 | End: 2019-09-30

## 2019-09-29 RX ORDER — AMLODIPINE BESYLATE 2.5 MG/1
2.5 TABLET ORAL DAILY
Refills: 0 | Status: DISCONTINUED | OUTPATIENT
Start: 2019-09-29 | End: 2019-10-03

## 2019-09-29 RX ORDER — ATORVASTATIN CALCIUM 80 MG/1
20 TABLET, FILM COATED ORAL AT BEDTIME
Refills: 0 | Status: DISCONTINUED | OUTPATIENT
Start: 2019-09-29 | End: 2019-10-03

## 2019-09-29 RX ORDER — FAMOTIDINE 10 MG/ML
20 INJECTION INTRAVENOUS
Refills: 0 | Status: DISCONTINUED | OUTPATIENT
Start: 2019-09-29 | End: 2019-09-30

## 2019-09-29 RX ORDER — DIPHENHYDRAMINE HCL 50 MG
50 CAPSULE ORAL ONCE
Refills: 0 | Status: COMPLETED | OUTPATIENT
Start: 2019-09-29 | End: 2019-09-29

## 2019-09-29 RX ORDER — ASPIRIN/CALCIUM CARB/MAGNESIUM 324 MG
81 TABLET ORAL DAILY
Refills: 0 | Status: DISCONTINUED | OUTPATIENT
Start: 2019-09-29 | End: 2019-10-03

## 2019-09-29 RX ORDER — ESCITALOPRAM OXALATE 10 MG/1
10 TABLET, FILM COATED ORAL DAILY
Refills: 0 | Status: DISCONTINUED | OUTPATIENT
Start: 2019-09-29 | End: 2019-10-03

## 2019-09-29 RX ORDER — EPINEPHRINE 0.3 MG/.3ML
0.3 INJECTION INTRAMUSCULAR; SUBCUTANEOUS ONCE
Refills: 0 | Status: COMPLETED | OUTPATIENT
Start: 2019-09-29 | End: 2019-09-29

## 2019-09-29 RX ORDER — FAMOTIDINE 10 MG/ML
20 INJECTION INTRAVENOUS ONCE
Refills: 0 | Status: COMPLETED | OUTPATIENT
Start: 2019-09-29 | End: 2019-09-29

## 2019-09-29 RX ORDER — MONTELUKAST 4 MG/1
10 TABLET, CHEWABLE ORAL DAILY
Refills: 0 | Status: DISCONTINUED | OUTPATIENT
Start: 2019-09-29 | End: 2019-10-03

## 2019-09-29 RX ORDER — ENOXAPARIN SODIUM 100 MG/ML
40 INJECTION SUBCUTANEOUS DAILY
Refills: 0 | Status: DISCONTINUED | OUTPATIENT
Start: 2019-09-29 | End: 2019-10-03

## 2019-09-29 RX ORDER — QUETIAPINE FUMARATE 200 MG/1
25 TABLET, FILM COATED ORAL AT BEDTIME
Refills: 0 | Status: DISCONTINUED | OUTPATIENT
Start: 2019-09-29 | End: 2019-10-03

## 2019-09-29 RX ORDER — SODIUM CHLORIDE 9 MG/ML
500 INJECTION INTRAMUSCULAR; INTRAVENOUS; SUBCUTANEOUS ONCE
Refills: 0 | Status: COMPLETED | OUTPATIENT
Start: 2019-09-29 | End: 2019-09-29

## 2019-09-29 RX ORDER — FUROSEMIDE 40 MG
60 TABLET ORAL
Refills: 0 | Status: DISCONTINUED | OUTPATIENT
Start: 2019-09-29 | End: 2019-10-03

## 2019-09-29 RX ORDER — INFLUENZA VIRUS VACCINE 15; 15; 15; 15 UG/.5ML; UG/.5ML; UG/.5ML; UG/.5ML
0.5 SUSPENSION INTRAMUSCULAR ONCE
Refills: 0 | Status: COMPLETED | OUTPATIENT
Start: 2019-09-29 | End: 2019-10-03

## 2019-09-29 RX ORDER — METOPROLOL TARTRATE 50 MG
100 TABLET ORAL
Refills: 0 | Status: DISCONTINUED | OUTPATIENT
Start: 2019-09-29 | End: 2019-10-03

## 2019-09-29 RX ORDER — TRAMADOL HYDROCHLORIDE 50 MG/1
50 TABLET ORAL EVERY 6 HOURS
Refills: 0 | Status: DISCONTINUED | OUTPATIENT
Start: 2019-09-29 | End: 2019-10-03

## 2019-09-29 RX ADMIN — FAMOTIDINE 20 MILLIGRAM(S): 10 INJECTION INTRAVENOUS at 15:55

## 2019-09-29 RX ADMIN — Medication 60 MILLIGRAM(S): at 18:40

## 2019-09-29 RX ADMIN — ATORVASTATIN CALCIUM 20 MILLIGRAM(S): 80 TABLET, FILM COATED ORAL at 21:10

## 2019-09-29 RX ADMIN — Medication 50 MILLIGRAM(S): at 15:55

## 2019-09-29 RX ADMIN — ENOXAPARIN SODIUM 40 MILLIGRAM(S): 100 INJECTION SUBCUTANEOUS at 19:54

## 2019-09-29 RX ADMIN — SODIUM CHLORIDE 500 MILLILITER(S): 9 INJECTION INTRAMUSCULAR; INTRAVENOUS; SUBCUTANEOUS at 17:28

## 2019-09-29 RX ADMIN — SODIUM CHLORIDE 500 MILLILITER(S): 9 INJECTION INTRAMUSCULAR; INTRAVENOUS; SUBCUTANEOUS at 15:50

## 2019-09-29 RX ADMIN — EPINEPHRINE 0.3 MILLIGRAM(S): 0.3 INJECTION INTRAMUSCULAR; SUBCUTANEOUS at 15:55

## 2019-09-29 RX ADMIN — Medication 125 MILLIGRAM(S): at 15:55

## 2019-09-29 RX ADMIN — TRAMADOL HYDROCHLORIDE 50 MILLIGRAM(S): 50 TABLET ORAL at 19:00

## 2019-09-29 RX ADMIN — EPINEPHRINE 0.3 MILLIGRAM(S): 0.3 INJECTION INTRAMUSCULAR; SUBCUTANEOUS at 16:30

## 2019-09-29 RX ADMIN — QUETIAPINE FUMARATE 25 MILLIGRAM(S): 200 TABLET, FILM COATED ORAL at 21:11

## 2019-09-29 RX ADMIN — TRAMADOL HYDROCHLORIDE 50 MILLIGRAM(S): 50 TABLET ORAL at 18:39

## 2019-09-29 RX ADMIN — Medication 50 MILLIGRAM(S): at 21:11

## 2019-09-29 RX ADMIN — Medication 50 SPRAY(S): at 19:54

## 2019-09-29 RX ADMIN — GABAPENTIN 300 MILLIGRAM(S): 400 CAPSULE ORAL at 21:11

## 2019-09-29 NOTE — H&P ADULT - NSHPPHYSICALEXAM_GEN_ALL_CORE
Vital Signs Last 24 Hrs  T(C): 36.4 (29 Sep 2019 15:32), Max: 36.4 (29 Sep 2019 15:32)  T(F): 97.5 (29 Sep 2019 15:32), Max: 97.5 (29 Sep 2019 15:32)  HR: 63 (29 Sep 2019 16:26) (63 - 64)  BP: 114/51 (29 Sep 2019 16:26) (114/51 - 114/56)  BP(mean): --  RR: 19 (29 Sep 2019 16:26) (18 - 19)  SpO2: 98% (29 Sep 2019 16:26) (98% - 98%)    Gen: NAD, sitting comfortably in bed  HEENT: NCAT, EOMI, PERRLA, tongue swollen, no cervical/anterior&posterior auricular/supraclavicular/submandibular JUSTIN, no tonsillar exudates or erythema, no oropharyngeal erythema, unable to visualize tonsils to determine Mallampati score  Cardiac: S1 S2, RRR  Chest/Lungs: mild barrel chest and kyphosis, good inspiratory effort bilaterally, GAEB, CTAB, minimal right sided rhonchi likely positional  Abd: +BS, soft, NTND  Ext: +1 PP b/l, -ve LLE b/l  Neuro: AAOx3, no focal deficits  Psych: normal affect

## 2019-09-29 NOTE — ED PROVIDER NOTE - PROGRESS NOTE DETAILS
ICU Dr. Menjivar aware, will come evaluate patient in ED. ICU fellow evaluating patient in ED. ICU resident Dr. Brown evaluated patient in ED. Approved for ICU admission with Dr. Menjivar

## 2019-09-29 NOTE — H&P ADULT - NSHPLABSRESULTS_GEN_ALL_CORE
Labs                        12.2   5.51  )-----------( 242      ( 29 Sep 2019 15:57 )             38.7     09-29    140  |  102  |  49<H>  ----------------------------<  134<H>  3.7   |  24  |  1.2    Ca    9.4      29 Sep 2019 15:57    TPro  6.9  /  Alb  4.2  /  TBili  <0.2  /  DBili  x   /  AST  32  /  ALT  24  /  AlkPhos  161<H>  09-29    Serum Pro-Brain Natriuretic Peptide (09.29.19 @ 15:57)    Serum Pro-Brain Natriuretic Peptide: 402 pg/mL    Troponin T, Serum (09.29.19 @ 15:57)    Troponin T, Serum: <0.01: Hemolyzed. Interpret with caution ng/mL    Blood Gas Venous - Lactate (09.29.19 @ 16:25)    Blood Gas Venous - Lactate: 1.6 mmoL/L    Blood Gas Profile - Venous (09.29.19 @ 16:25)    pH, Venous: 7.37    pCO2, Venous: 48 mmHg    pO2, Venous: 63 mmHg    HCO3, Venous: 28 mmoL/L    Base Excess, Venous: 1.2 mmoL/L    Oxygen Saturation, Venous: 91 %    FIO2, Venous: 21      Radiology  CXR pending

## 2019-09-29 NOTE — ED PROVIDER NOTE - CLINICAL SUMMARY MEDICAL DECISION MAKING FREE TEXT BOX
74F pmhx of COPD on 2LPM O2, FLAVIO, CHF, HTN, HLD p/w  tongue swelling and worsening sob. Only recent medication addition was fluticasone 1 week ago. Physical exam noted to have tongue swelling. VS wnl, sat 99% on rm air. Due to concern of airway compromise we administered epi im x 2, solumedrol, Pepcid, and fluids. For imaging ED CXR prelim: No PTX, No infiltrates, No Free air. Pt was reassessed multiple times, pt's symptoms remained unchanged, no drooling, able to swallow secretions, still noted to have tongue swelling. Labs reviewed, wnl. Case discussed with ICU, will admit for steroids, cont pulse ox, and admission.

## 2019-09-29 NOTE — ED CLERICAL - NS ED CLERK NOTE PRE-ARRIVAL INFORMATION; ADDITIONAL PRE-ARRIVAL INFORMATION
This patient is enrolled in the STARS readmission reduction initiative and has active care navigation. This patient can be followed up by the care navigation team within 24 hours.  To arrange close follow-up or to obtain additional clinical information, please call the contact number above. The on call [facility] Hospitalist has been notified and will coordinate care in concert with the ED Physician including consults as necessary. Call [phone] (North), [phone] (South) to reach the hospitalist. You may also call the Hospitalist Division at  at either site. Consider CDU for management per guidelines

## 2019-09-29 NOTE — H&P ADULT - HISTORY OF PRESENT ILLNESS
74 year old female with a PMHx of Angioedema secondary to Losartan and Verapamil, CHF, HTN, COPD/Emphysema on O2 4L prn since 10/2019, HCM, FLAVIO on CPAP, PVD, TIA, Bipolar type 1 presenting for swelling of the tongue of 1 day duration. Patient reports that it started right after she took Trelecy at 2pm yesterday which was newly prescribed. Patient reports that she called her PMD who recommended her to take Benadryl. Her symptoms improved after she took Benadryl 50mg yesterday. Today she reports that the tongue swelling has worsened and it is associated with changes in her phonation. Patient denies hoarseness, sore throat, lip swelling, dysphagia, odynophagia, gagging, inability to swallow, drooling, shortness of breath, cough or wheezing. Patient denies fevers, chills, nausea, vomiting, chest pain, palpitations, abdominal pain, diarrhea, lower extremity pain/swelling/erythema. 74 year old female with a PMHx of Angioedema secondary to Losartan and Verapamil, CHF, HTN, COPD/Emphysema on O2 4L prn since 10/2019, HCM, FLAVIO on CPAP, PVD, TIA, Bipolar type 1 presenting for swelling of the tongue of 1 day duration. Patient reports that it started right after she took Trelecy at 2pm yesterday which was newly prescribed. Patient reports that she called her PMD who recommended her to take Benadryl. Her symptoms improved after she took Benadryl 50mg yesterday. Today she reports that the tongue swelling has worsened and it is associated with changes in her phonation. Patient denies hoarseness, sore throat, lip swelling, dysphagia, odynophagia, gagging, inability to swallow, drooling, shortness of breath, cough or wheezing. Patient denies fevers, chills, nausea, vomiting, chest pain, palpitations, abdominal pain, diarrhea, lower extremity pain/swelling/erythema.    ED: Solumedrol 125mg IV, Epinephrine 0.3mg IM x2, Diphendydramine 50mg IV once, Famotidine 20mg IV, NS 500cc

## 2019-09-29 NOTE — ED PROVIDER NOTE - CHIEF COMPLAINT
The patient is a 74y Female complaining of The patient is a 74y Female complaining of tongue swelling

## 2019-09-29 NOTE — ED PROVIDER NOTE - ATTENDING CONTRIBUTION TO CARE
74 F pmhx of COPD on 2LPM O2, FLAVIO, CHF, HTN, HLD p/w  tongue swelling and worsening sob. Only recent medication addition was fluticasone 1 week ago. Hx of anaphylaxis to ace/arb last year. Currently speaking, but states she had difficulty swallowing, and according to her  she states she sounds a little different. Symptoms started 2pm yesterday, took Benadryl x 2, with no resolution of symptom.   PE- Gen- Mild distress, ENT- Diffuse tongue swelling, able to see top portion of tonsils. Chest- Mild crackles at the bases, ABD- Soft NT/ND, LE- Bilat LE +1 swelling- pitting.   Due to the concern for anaphylaxis pt was immediately seen by my, given epi im x 2, solumedrol, Pepcid, and fluids.   Pending XR

## 2019-09-29 NOTE — ED PROVIDER NOTE - OBJECTIVE STATEMENT
74 year old female w hx of COPD on 2LPM home O2, FLAVIO, CHF, HTN, HLD presents to the ED for evaluation of constant, moderate, progressively worsening tongue swelling. Patient has a history of angioedema associated with ACE/ARB use. Last week was started on a new inhaler for her COPD (Tregely) and noticed tongue swelling that resolved after 25 mg benadryl. Patient stopped using inhaler 2 days ago, but tongue swelling returned around 1 PM today. Took another 25 mg benadryl today w/o relief. Also reports voice change, difficulty swallowing, shortness of breath, and abd pain. Denies syncope, chest pain, n/v, fevers/chills, new foods, recent travel/sick contacts.

## 2019-09-29 NOTE — ED PROVIDER NOTE - NS ED ROS FT
CONSTITUTIONAL: (-) fevers, (-) chills  ENT: (-) congestion, (-) rhinorrhea, (-) sinus pain, (-) sore throat, (+) difficulty swallowing, (+) tongue swelling  NECK: (-) neck pain, (-) neck stiffness  CARDIO: (-) chest pain, (-) palpitations, (-) edema  PULM: (-) cough, (-) sputum, (+) shortness of breath, (-) wheezing, (-) stridor  GI: (-) nausea, (-) vomiting, (-) diarrhea, (+) abdominal pain  : (-) dysuria, (-) hematuria, (-) frequency, (-) flank pain  MSK: (-) back pain, (-) myalgias, (-) joint swelling, (-) joint stiffness, (-) gait difficulty  SKIN: (-) rashes, (-) wounds, (-) pallor, (-) ecchymosis  NEURO: (-) headache, (-) dizziness, (-) lightheadedness, (-) syncope, (-) weakness    *all other systems negative except as documented above and in the HPI*

## 2019-09-29 NOTE — H&P ADULT - NSHPSOCIALHISTORY_GEN_ALL_CORE
Quit smoking 25 years ago per , was smoking 30 years prior  No EtOH abuse  No illicit drug use    Active in ADLs at baseline

## 2019-09-29 NOTE — ED PROVIDER NOTE - PHYSICAL EXAMINATION
VITALS:  I have reviewed the initial vital signs.  GENERAL: Well-developed, overweight chronically ill appearing female, in no acute distress. Accompanied by .  HEENT: Sclera clear. No conjunctival injection. EOMI, PERRLA. Mucous membranes moist. +tongue swelling. uvula midline w/o swelling. tolerating oral secretions, no trismus.   NECK: supple w FROM. No cervical adenopathy. No JVD.  CARDIO: RRR, nl S1 and S2. No murmurs, rubs, or gallops. 2+ radial pulses bilaterally.  PULM: Normal effort. No tachypnea or retractions. No stridor. good air movement b/l with diffuse expiratory wheezing.  GI: Abdomen soft and non-distended. Nontender in all four quadrants without rebound or guarding.  : No CVA tenderness b/l.  SKIN: Warm, dry. No pallor or rashes. Capillary refill <2 seconds.  NEURO: A&Ox3. Speech clear. No gross motor/sensory deficits.

## 2019-09-29 NOTE — H&P ADULT - NSICDXPASTMEDICALHX_GEN_ALL_CORE_FT
PAST MEDICAL HISTORY:  Allergic reaction     Bipolar 1 disorder     Congestive heart failure     COPD (chronic obstructive pulmonary disease)     Depression     Hypertension     FLAVIO on CPAP     Other cardiomyopathy     Other emphysema     PVD (peripheral vascular disease)     TIA (transient ischemic attack)     Transient ischemic attack

## 2019-09-29 NOTE — H&P ADULT - ASSESSMENT
74 year old female with a PMHx of Angioedema secondary to Losartan and Verapamil, CHF, HTN, COPD/Emphysema on O2 4L prn since 10/2019, HCM, FLAVIO on CPAP, PVD, TIA, Bipolar type 1 presenting for swelling of the tongue of 1 day duration.    Impression  Angioedema    Plan 74 year old female with a PMHx of Angioedema secondary to Losartan and Verapamil, CHF, HTN, COPD/Emphysema on O2 4L prn since 10/2019, HCM, FLAVIO on CPAP, PVD, TIA, Bipolar type 1 presenting for swelling of the tongue of 1 day duration.    ED: Solumedrol 125mg IV, Epinephrine 0.3mg IM x2, Diphendydramine 50mg IV once, Famotidine 20mg IV, NS 500cc    IMPRESSION:  Angioedema  COPD/Emphysema on 4L home O2 prn  FLAVIO on CPAP  CHF  HTN  Bipolar disorder type 1    PLAN:  CNS: avoid CNS depressants    HEENT: Oral care. Speech and swallow. NPO for now. Resume benadryl 50mg q8h around the clock with pepcid 20mg IV bid and solumedrol 60mg IV push bid starting tomorrow. If worsening edema or respiratory compromise, give Epinephrine 0.3mg IM stat    PULMONARY: Keep SpO2 > 92%. Aspiration precautions, head of bed elevated. Stop Trelegy Ellipta as possible cause for angioedema. CPAP at night.    CARDIOVASCULAR: Avoid volume overload. Resume diuretics home dose    GI: Keep NPO. Pepcid 20 IV bid    RENAL: Monitor lytes. Replete as necessary. Monitor UO. F/u CMP    INFECTIOUS DISEASE: afebrile, no leukocytosis, monitor    HEMATOLOGICAL: DVT prophylaxis    ENDOCRINE: TSH in AM    MUSCULOSKELETAL: Bed rest for now    Full code  Family at bedside and aware of plan of care

## 2019-09-30 LAB
ALBUMIN SERPL ELPH-MCNC: 4.5 G/DL — SIGNIFICANT CHANGE UP (ref 3.5–5.2)
ALP SERPL-CCNC: 177 U/L — HIGH (ref 30–115)
ALT FLD-CCNC: 25 U/L — SIGNIFICANT CHANGE UP (ref 0–41)
ANION GAP SERPL CALC-SCNC: 18 MMOL/L — HIGH (ref 7–14)
APTT BLD: 32.7 SEC — SIGNIFICANT CHANGE UP (ref 27–39.2)
AST SERPL-CCNC: 31 U/L — SIGNIFICANT CHANGE UP (ref 0–41)
BASOPHILS # BLD AUTO: 0 K/UL — SIGNIFICANT CHANGE UP (ref 0–0.2)
BASOPHILS NFR BLD AUTO: 0 % — SIGNIFICANT CHANGE UP (ref 0–1)
BILIRUB SERPL-MCNC: 0.3 MG/DL — SIGNIFICANT CHANGE UP (ref 0.2–1.2)
BLD GP AB SCN SERPL QL: SIGNIFICANT CHANGE UP
BUN SERPL-MCNC: 42 MG/DL — HIGH (ref 10–20)
CALCIUM SERPL-MCNC: 9.9 MG/DL — SIGNIFICANT CHANGE UP (ref 8.5–10.1)
CHLORIDE SERPL-SCNC: 103 MMOL/L — SIGNIFICANT CHANGE UP (ref 98–110)
CO2 SERPL-SCNC: 25 MMOL/L — SIGNIFICANT CHANGE UP (ref 17–32)
CREAT SERPL-MCNC: 1.1 MG/DL — SIGNIFICANT CHANGE UP (ref 0.7–1.5)
EOSINOPHIL # BLD AUTO: 0 K/UL — SIGNIFICANT CHANGE UP (ref 0–0.7)
EOSINOPHIL NFR BLD AUTO: 0 % — SIGNIFICANT CHANGE UP (ref 0–8)
GLUCOSE SERPL-MCNC: 123 MG/DL — HIGH (ref 70–99)
HCT VFR BLD CALC: 44.5 % — SIGNIFICANT CHANGE UP (ref 37–47)
HGB BLD-MCNC: 14.2 G/DL — SIGNIFICANT CHANGE UP (ref 12–16)
IMM GRANULOCYTES NFR BLD AUTO: 0.4 % — HIGH (ref 0.1–0.3)
INR BLD: 1.05 RATIO — SIGNIFICANT CHANGE UP (ref 0.65–1.3)
LYMPHOCYTES # BLD AUTO: 0.72 K/UL — LOW (ref 1.2–3.4)
LYMPHOCYTES # BLD AUTO: 9.3 % — LOW (ref 20.5–51.1)
MAGNESIUM SERPL-MCNC: 2.2 MG/DL — SIGNIFICANT CHANGE UP (ref 1.8–2.4)
MCHC RBC-ENTMCNC: 31.1 PG — HIGH (ref 27–31)
MCHC RBC-ENTMCNC: 31.9 G/DL — LOW (ref 32–37)
MCV RBC AUTO: 97.4 FL — SIGNIFICANT CHANGE UP (ref 81–99)
MONOCYTES # BLD AUTO: 0.13 K/UL — SIGNIFICANT CHANGE UP (ref 0.1–0.6)
MONOCYTES NFR BLD AUTO: 1.7 % — SIGNIFICANT CHANGE UP (ref 1.7–9.3)
NEUTROPHILS # BLD AUTO: 6.87 K/UL — HIGH (ref 1.4–6.5)
NEUTROPHILS NFR BLD AUTO: 88.6 % — HIGH (ref 42.2–75.2)
NRBC # BLD: 0 /100 WBCS — SIGNIFICANT CHANGE UP (ref 0–0)
PLATELET # BLD AUTO: 258 K/UL — SIGNIFICANT CHANGE UP (ref 130–400)
POTASSIUM SERPL-MCNC: 3.8 MMOL/L — SIGNIFICANT CHANGE UP (ref 3.5–5)
POTASSIUM SERPL-SCNC: 3.8 MMOL/L — SIGNIFICANT CHANGE UP (ref 3.5–5)
PROT SERPL-MCNC: 7.6 G/DL — SIGNIFICANT CHANGE UP (ref 6–8)
PROTHROM AB SERPL-ACNC: 12.1 SEC — SIGNIFICANT CHANGE UP (ref 9.95–12.87)
RBC # BLD: 4.57 M/UL — SIGNIFICANT CHANGE UP (ref 4.2–5.4)
RBC # FLD: 15.3 % — HIGH (ref 11.5–14.5)
SODIUM SERPL-SCNC: 146 MMOL/L — SIGNIFICANT CHANGE UP (ref 135–146)
TROPONIN T SERPL-MCNC: <0.01 NG/ML — SIGNIFICANT CHANGE UP
WBC # BLD: 7.75 K/UL — SIGNIFICANT CHANGE UP (ref 4.8–10.8)
WBC # FLD AUTO: 7.75 K/UL — SIGNIFICANT CHANGE UP (ref 4.8–10.8)

## 2019-09-30 RX ORDER — FAMOTIDINE 10 MG/ML
20 INJECTION INTRAVENOUS ONCE
Refills: 0 | Status: COMPLETED | OUTPATIENT
Start: 2019-09-30 | End: 2019-09-30

## 2019-09-30 RX ORDER — FLUTICASONE PROPIONATE 50 MCG
1 SPRAY, SUSPENSION NASAL
Refills: 0 | Status: DISCONTINUED | OUTPATIENT
Start: 2019-09-30 | End: 2019-10-03

## 2019-09-30 RX ORDER — BUDESONIDE AND FORMOTEROL FUMARATE DIHYDRATE 160; 4.5 UG/1; UG/1
2 AEROSOL RESPIRATORY (INHALATION)
Refills: 0 | Status: DISCONTINUED | OUTPATIENT
Start: 2019-09-30 | End: 2019-10-03

## 2019-09-30 RX ADMIN — Medication 60 MILLIGRAM(S): at 05:25

## 2019-09-30 RX ADMIN — Medication 1 SPRAY(S): at 18:13

## 2019-09-30 RX ADMIN — GABAPENTIN 300 MILLIGRAM(S): 400 CAPSULE ORAL at 14:22

## 2019-09-30 RX ADMIN — FAMOTIDINE 20 MILLIGRAM(S): 10 INJECTION INTRAVENOUS at 18:44

## 2019-09-30 RX ADMIN — TRAMADOL HYDROCHLORIDE 50 MILLIGRAM(S): 50 TABLET ORAL at 09:00

## 2019-09-30 RX ADMIN — Medication 50 MILLIGRAM(S): at 05:27

## 2019-09-30 RX ADMIN — Medication 0.5 MILLIGRAM(S): at 18:13

## 2019-09-30 RX ADMIN — ENOXAPARIN SODIUM 40 MILLIGRAM(S): 100 INJECTION SUBCUTANEOUS at 11:33

## 2019-09-30 RX ADMIN — Medication 81 MILLIGRAM(S): at 11:24

## 2019-09-30 RX ADMIN — MONTELUKAST 10 MILLIGRAM(S): 4 TABLET, CHEWABLE ORAL at 11:24

## 2019-09-30 RX ADMIN — ATORVASTATIN CALCIUM 20 MILLIGRAM(S): 80 TABLET, FILM COATED ORAL at 21:39

## 2019-09-30 RX ADMIN — Medication 50 MILLIGRAM(S): at 15:44

## 2019-09-30 RX ADMIN — Medication 50 SPRAY(S): at 05:26

## 2019-09-30 RX ADMIN — TRAMADOL HYDROCHLORIDE 50 MILLIGRAM(S): 50 TABLET ORAL at 08:27

## 2019-09-30 RX ADMIN — GABAPENTIN 300 MILLIGRAM(S): 400 CAPSULE ORAL at 05:25

## 2019-09-30 RX ADMIN — Medication 100 MILLIGRAM(S): at 05:26

## 2019-09-30 RX ADMIN — QUETIAPINE FUMARATE 25 MILLIGRAM(S): 200 TABLET, FILM COATED ORAL at 21:39

## 2019-09-30 RX ADMIN — AMLODIPINE BESYLATE 2.5 MILLIGRAM(S): 2.5 TABLET ORAL at 05:26

## 2019-09-30 RX ADMIN — GABAPENTIN 300 MILLIGRAM(S): 400 CAPSULE ORAL at 21:37

## 2019-09-30 RX ADMIN — Medication 60 MILLIGRAM(S): at 05:32

## 2019-09-30 RX ADMIN — FAMOTIDINE 100 MILLIGRAM(S): 10 INJECTION INTRAVENOUS at 05:25

## 2019-09-30 RX ADMIN — Medication 0.5 MILLIGRAM(S): at 05:32

## 2019-09-30 RX ADMIN — Medication 50 MILLIGRAM(S): at 21:39

## 2019-09-30 RX ADMIN — Medication 60 MILLIGRAM(S): at 17:51

## 2019-09-30 RX ADMIN — ESCITALOPRAM OXALATE 10 MILLIGRAM(S): 10 TABLET, FILM COATED ORAL at 11:24

## 2019-09-30 NOTE — SWALLOW BEDSIDE ASSESSMENT ADULT - COMMENTS
known to SLP patient with complaints of difficulty for 1-2 days around admission with improvement each day. Reports improvement from admission. RN Sheyla reports toleration of medications. patient with complaint and stated she was not ready for solids

## 2019-09-30 NOTE — SWALLOW BEDSIDE ASSESSMENT ADULT - SWALLOW EVAL: RECOMMENDED FEEDING/EATING TECHNIQUES
Fluzone Quadrivalent 0 5mL  Lot [id number]  Exp 06/30/2019  NDC 23993-124-65  Right Deltoid
Problem List Items Addressed This Visit     Supervision of normal pregnancy in second trimester - Primary     Denies OB complaints  Denies contractions, cramping, leakage of fluid or vaginal bleeding  Flu vaccine was administered today  Confirm fetal cardiac activity using ultrasound     Absent heart tones on Doppler and no cardiac activity observed on bedside US  Discussed with patient and spouse and emotional support provided  Phone report to MFM RN staff   To [facility] for further eval            Other Visit Diagnoses     Encounter for pregnancy related examination in first trimester        Relevant Orders    Chlamydia/GC amplified DNA by PCR
oral hygiene/small sips/bites

## 2019-09-30 NOTE — PROGRESS NOTE ADULT - ASSESSMENT
Angioedema      No more Trelegy  cont Symbicort 160  cam got to [facility]  F/U Dr. Chavez at next visit.

## 2019-09-30 NOTE — PROGRESS NOTE ADULT - ASSESSMENT
Patient is a 74 year old female with a PMHx of Angioedema secondary to Losartan and Verapamil, CHF, HTN, COPD/Emphysema on O2 4L prn since 10/2019, HCM, FLAVIO on CPAP, PVD, TIA, Bipolar type 1 presenting for swelling of the tongue of 1 day duration. Suspect Angioedema secondary to Trelegy Ellipta.     #) Angioedema   - Patient recently started on Trelegy Ellipta by her pulmonologist Patient is a 74 year old female with a PMHx of Angioedema secondary to Losartan and Verapamil, CHF, HTN, COPD/Emphysema on O2 4L prn since 10/2019, HCM, FLAVIO on CPAP, PVD, TIA, Bipolar type 1 presenting for swelling of the tongue of 1 day duration. Suspect Angioedema secondary to Trelegy Ellipta.     #) Angioedema   - Patient recently started on Trelegy Ellipta by her pulmonologist, developed Angioedema   - Patient is a 74 year old female with a PMHx of Angioedema secondary to Losartan and Verapamil, CHF, HTN, COPD/Emphysema on O2 4L prn since 10/2019, HCM, FLAVIO on CPAP, PVD, TIA, Bipolar type 1 presenting for swelling of the tongue of 1 day duration. Suspect Angioedema secondary to Trelegy Ellipta.     #) Angioedema   - Patient recently started on Trelegy Ellipta by her pulmonologist, developed Angioedema   - She responded well to Benadryl, Solumedrol, and Epinephrine   - Currently on Benadryl and Solumedrol  - Evaluated by S&S, can be started on puree and increase as tolerated (as comfortable for the patient)     #) Hx of COPD on home O2  - Cont with Symbicort for now and on discharge as well duonebs PRN (per patient request) and Singulair   - Can follow up with Dr. Chavez on her scheduled appointment on 10/29/2019    #) Hx of CHF   - Cont with Lasix and Metoprolol     #) Hx of HTN  - Cont with Metoprolol and Amlodipine     #) Hx of Depression and Anxiety  - Cont with Escitalopram, Ativan, and Seroquel     #Diet: Puree, advance as tolerated  #Activity: Out of bed to chair  #DVT PPx: Lovenox subQ  #GI PPx: Not indicated  #DIspo: Acute, pending

## 2019-09-30 NOTE — PROGRESS NOTE ADULT - SUBJECTIVE AND OBJECTIVE BOX
SUBJECTIVE:    Patient is a 74y old Female who presents with a chief complaint of angioedema (29 Sep 2019 17:00)    Currently admitted to medicine with the primary diagnosis of Tongue swelling     Today is hospital day 1d. Patient feels better this morning. She still has some swelling but is able to swallow liquids better. She is still struggling with solids.     PAST MEDICAL & SURGICAL HISTORY  Congestive heart failure  Transient ischemic attack  FLAVIO on CPAP  Bipolar 1 disorder  Allergic reaction  PVD (peripheral vascular disease)  Other emphysema  Other cardiomyopathy  TIA (transient ischemic attack)  COPD (chronic obstructive pulmonary disease)  Depression  Hypertension  History of cholecystectomy    SOCIAL HISTORY:  Negative for smoking/alcohol/drug use.     ALLERGIES:  codeine (Other (Moderate))  Depakote (Unknown)  losartan (Angioedema)  Risperdal (Other)  verapamil (Short breath; Angioedema)    MEDICATIONS:  STANDING MEDICATIONS  amLODIPine   Tablet 2.5 milliGRAM(s) Oral daily  aspirin enteric coated 81 milliGRAM(s) Oral daily  atorvastatin 20 milliGRAM(s) Oral at bedtime  buDESOnide 160 MICROgram(s)/formoterol 4.5 MICROgram(s) Inhaler 2 Puff(s) Inhalation two times a day  diphenhydrAMINE   Injectable 50 milliGRAM(s) IV Push every 8 hours  enoxaparin Injectable 40 milliGRAM(s) SubCutaneous daily  escitalopram 10 milliGRAM(s) Oral daily  famotidine  IVPB 20 milliGRAM(s) IV Intermittent two times a day  fluticasone propionate 50 MICROgram(s)/spray Nasal Spray 50 Spray(s) Both Nostrils two times a day  furosemide    Tablet 60 milliGRAM(s) Oral two times a day  gabapentin 300 milliGRAM(s) Oral three times a day  influenza   Vaccine 0.5 milliLiter(s) IntraMuscular once  methylPREDNISolone sodium succinate Injectable 60 milliGRAM(s) IV Push every 12 hours  metoprolol tartrate 100 milliGRAM(s) Oral two times a day  montelukast 10 milliGRAM(s) Oral daily  QUEtiapine 25 milliGRAM(s) Oral at bedtime    PRN MEDICATIONS  LORazepam     Tablet 0.5 milliGRAM(s) Oral two times a day PRN  traMADol 50 milliGRAM(s) Oral every 6 hours PRN    VITALS:   T(F): 97.4  HR: 64  BP: 127/61  RR: 18  SpO2: 98%    LABS:                        14.2   7.75  )-----------( 258      ( 30 Sep 2019 06:16 )             44.5     09-30    146  |  103  |  42<H>  ----------------------------<  123<H>  3.8   |  25  |  1.1    Ca    9.9      30 Sep 2019 06:16  Mg     2.2     09-30    TPro  7.6  /  Alb  4.5  /  TBili  0.3  /  DBili  x   /  AST  31  /  ALT  25  /  AlkPhos  177<H>  09-30    PT/INR - ( 30 Sep 2019 06:16 )   PT: 12.10 sec;   INR: 1.05 ratio         PTT - ( 30 Sep 2019 06:16 )  PTT:32.7 sec      Troponin T, Serum: <0.01 ng/mL (09-30-19 @ 07:30)  Troponin T, Serum: <0.01 ng/mL (09-29-19 @ 15:57)      CARDIAC MARKERS ( 30 Sep 2019 07:30 )  x     / <0.01 ng/mL / x     / x     / x      CARDIAC MARKERS ( 29 Sep 2019 15:57 )  x     / <0.01 ng/mL / x     / x     / x          RADIOLOGY:    PHYSICAL EXAM:  GEN: No acute distress  HEENT: Tongue swelling present but patient able to speak comfortably   LUNGS: Clear to auscultation bilaterally   HEART: S1/S2 present. RRR.   ABD: Soft, non-tender, non-distended. Bowel sounds present  EXT: NC/NC/NE/2+PP/BRADY  NEURO: AAOX3

## 2019-09-30 NOTE — SWALLOW BEDSIDE ASSESSMENT ADULT - ASR SWALLOW LINGUAL MOBILITY
impaired left lateral movement/impaired right lateral movement/no mandible disassociation/impaired protrusion/impaired anterior elevation

## 2019-09-30 NOTE — CHART NOTE - NSCHARTNOTEFT_GEN_A_CORE
Patient seen and examined throughout the course of the day.   Results of Labs/Imaging discussed as well as patient's plan.    All patient's/family questions answered.   Patient and family encouraged to contact PA with any further issues.

## 2019-09-30 NOTE — PROGRESS NOTE ADULT - SUBJECTIVE AND OBJECTIVE BOX
SHAUN COATES        Patient is a 74y old  Female who presents with a chief complaint of angioedema (30 Sep 2019 12:06)      HPI:  74 year old female with a PMHx of Angioedema secondary to Losartan and Verapamil, CHF, HTN, COPD/Emphysema on O2 4L prn since 10/2019, HCM, FLAVIO on CPAP, PVD, TIA, Bipolar type 1 presenting for swelling of the tongue of 1 day duration. Patient reports that it started right after she took Trelecy at 2pm yesterday which was newly prescribed. Patient reports that she called her PMD who recommended her to take Benadryl. Her symptoms improved after she took Benadryl 50mg yesterday. Today she reports that the tongue swelling has worsened and it is associated with changes in her phonation. Patient denies hoarseness, sore throat, lip swelling, dysphagia, odynophagia, gagging, inability to swallow, drooling, shortness of breath, cough or wheezing. Patient denies fevers, chills, nausea, vomiting, chest pain, palpitations, abdominal pain, diarrhea, lower extremity pain/swelling/erythema.    ED: Solumedrol 125mg IV, Epinephrine 0.3mg IM x2, Diphendydramine 50mg IV once, Famotidine 20mg IV, NS 500cc (29 Sep 2019 17:00).    Today she is feeling better, no airway compromise.      Allergies    codeine (Other (Moderate))  Depakote (Unknown)  losartan (Angioedema)  Risperdal (Other)  verapamil (Short breath; Angioedema)    Intolerances        Daily Height in cm: 157.48 (29 Sep 2019 17:43)    Daily Weight in k.8 (30 Sep 2019 07:01)    I&O's Summary    29 Sep 2019 07:01  -  30 Sep 2019 07:00  --------------------------------------------------------  IN: 110 mL / OUT: 1000 mL / NET: -890 mL        FAMILY HISTORY:  No pertinent family history in first degree relatives      SOCIAL:    Marital Status:  (  x )    (   ) Single    (   )    (  )   Occupation:   Lives with: (  ) alone  (  ) children   ( x ) spouse   (  ) parents  (  ) other  Recent Travel:     Substance Use (street drugs): ( x ) never used  (  ) other:  Tobacco Usage:  (   ) never smoked   (  x ) former smoker   (   ) current smoker  (     ) pack year  Alcohol Usage:        HEALTH ISSUES - PROBLEM Dx:          Vital Signs Last 24 Hrs  T(C): 36.3 (30 Sep 2019 11:00), Max: 36.4 (29 Sep 2019 15:32)  T(F): 97.4 (30 Sep 2019 11:00), Max: 97.5 (29 Sep 2019 15:32)  HR: 68 (30 Sep 2019 11:00) (63 - 84)  BP: 135/64 (30 Sep 2019 11:00) (110/50 - 135/64)  BP(mean): 92 (30 Sep 2019 11:00) (72 - 92)  RR: 18 (30 Sep 2019 11:00) (8 - 29)  SpO2: 92% (30 Sep 2019 11:00) (92% - 100%)      PT/INR - ( 30 Sep 2019 06:16 )   PT: 12.10 sec;   INR: 1.05 ratio         PTT - ( 30 Sep 2019 06:16 )  PTT:32.7 sec                          14.2   7.75  )-----------( 258      ( 30 Sep 2019 06:16 )             44.5       09    146  |  103  |  42<H>  ----------------------------<  123<H>  3.8   |  25  |  1.1    Ca    9.9      30 Sep 2019 06:16  Mg     2.2         TPro  7.6  /  Alb  4.5  /  TBili  0.3  /  DBili  x   /  AST  31  /  ALT  25  /  AlkPhos  177<H>  09-30      CARDIAC MARKERS ( 30 Sep 2019 07:30 )  x     / <0.01 ng/mL / x     / x     / x      CARDIAC MARKERS ( 29 Sep 2019 15:57 )  x     / <0.01 ng/mL / x     / x     / x            LIVER FUNCTIONS - ( 30 Sep 2019 06:16 )  Alb: 4.5 g/dL / Pro: 7.6 g/dL / ALK PHOS: 177 U/L / ALT: 25 U/L / AST: 31 U/L / GGT: x                 CAPILLARY BLOOD GLUCOSE                      Radiology: Radiology personally reviewed.    MEDICATIONS  (STANDING):  amLODIPine   Tablet 2.5 milliGRAM(s) Oral daily  aspirin enteric coated 81 milliGRAM(s) Oral daily  atorvastatin 20 milliGRAM(s) Oral at bedtime  buDESOnide 160 MICROgram(s)/formoterol 4.5 MICROgram(s) Inhaler 2 Puff(s) Inhalation two times a day  diphenhydrAMINE   Injectable 50 milliGRAM(s) IV Push every 8 hours  enoxaparin Injectable 40 milliGRAM(s) SubCutaneous daily  escitalopram 10 milliGRAM(s) Oral daily  famotidine  IVPB 20 milliGRAM(s) IV Intermittent two times a day  fluticasone propionate 50 MICROgram(s)/spray Nasal Spray 50 Spray(s) Both Nostrils two times a day  furosemide    Tablet 60 milliGRAM(s) Oral two times a day  gabapentin 300 milliGRAM(s) Oral three times a day  influenza   Vaccine 0.5 milliLiter(s) IntraMuscular once  methylPREDNISolone sodium succinate Injectable 60 milliGRAM(s) IV Push every 12 hours  metoprolol tartrate 100 milliGRAM(s) Oral two times a day  montelukast 10 milliGRAM(s) Oral daily  QUEtiapine 25 milliGRAM(s) Oral at bedtime    MEDICATIONS  (PRN):  LORazepam     Tablet 0.5 milliGRAM(s) Oral two times a day PRN Agitation  traMADol 50 milliGRAM(s) Oral every 6 hours PRN Severe Pain (7 - 10)      Prescriptions:  furosemide 20 mg oral tablet: 3 tab(s) orally 2 times a day (29 Sep 2019 17:22)  gabapentin 300 mg oral capsule: 1 cap(s) orally 3 times a day (29 Sep 2019 17:22)  traMADol 50 mg oral tablet: 1 tab(s) orally every 6 hours, As needed, Severe Pain (7 - 10) MDD:4 (29 Sep 2019 17:22)        REVIEW OF SYSTEMS:    Review of Systems:  · CONSTITUTIONAL: no fever and no chills.  · EYES: no discharge, no irritation, no pain, no redness, and no visual changes.  · ENMT: Ears: no ear pain and no hearing problems.Nose: no nasal congestion and no nasal drainage.Mouth/Throat: no dysphagia, no hoarseness and no throat pain.Neck: no lumps, no pain, no stiffness and no swollen glands.  · CARDIOVASCULAR: no chest pain  · RESPIRATORY: - - -   · Respiratory [+]: tingling of tongue  · GASTROINTESTINAL: no abdominal pain, no bloating, no constipation, no diarrhea, no nausea and no vomiting.  · GENITOURINARY: no dysuria, no frequency, and no hematuria.  · MUSCULOSKELETAL: no back pain, no gout, no musculoskeletal pain, no neck pain, and no weakness.  · SKIN: no abrasions, no jaundice, no lesions, no pruritis, and no rashes.  · NEURO: no loss of consciousness, no gait abnormality, no headache, no sensory deficits, and no weakness.  · PSYCHIATRIC: no known mental health issues.    PHYSICAL EXAM:   · CONSTITUTIONAL: Well appearing, well nourished, NAD  · ENMT: Airway patent, Nasal mucosa clear. Mouth with normal mucosa. Throat has no vesicles, no oropharyngeal exudates and uvula is midline. good phonation, no airway compromise  · EYES: Clear bilaterally, pupils equal, round and reactive to light.  · CARDIAC: Normal rate, regular rhythm.  Heart sounds S1, S2.  No murmurs, rubs or gallops.  · RESPIRATORY: clear  · GASTROINTESTINAL: Abdomen soft, non-tender, no guarding.  · MUSCULOSKELETAL: Spine appears normal, range of motion is not limited, no muscle or joint tenderness  · NEUROLOGICAL: Alert and oriented, no focal deficits, no motor or sensory deficits.  · SKIN: Skin normal color for race, warm, dry and intact. No evidence of rash.  · PSYCHIATRIC: Alert and oriented to person, place, time/situation. normal mood and affect. no apparent risk to self or others.  · HEME LYMPH: No adenopathy or splenomegaly. No cervical or inguinal lymphadenopathy.

## 2019-10-01 LAB — TSH SERPL-MCNC: 3.42 UIU/ML — SIGNIFICANT CHANGE UP (ref 0.27–4.2)

## 2019-10-01 RX ORDER — PANTOPRAZOLE SODIUM 20 MG/1
40 TABLET, DELAYED RELEASE ORAL
Refills: 0 | Status: DISCONTINUED | OUTPATIENT
Start: 2019-10-01 | End: 2019-10-03

## 2019-10-01 RX ADMIN — Medication 50 MILLIGRAM(S): at 05:52

## 2019-10-01 RX ADMIN — MONTELUKAST 10 MILLIGRAM(S): 4 TABLET, CHEWABLE ORAL at 11:27

## 2019-10-01 RX ADMIN — Medication 60 MILLIGRAM(S): at 05:51

## 2019-10-01 RX ADMIN — QUETIAPINE FUMARATE 25 MILLIGRAM(S): 200 TABLET, FILM COATED ORAL at 21:55

## 2019-10-01 RX ADMIN — Medication 0.5 MILLIGRAM(S): at 13:40

## 2019-10-01 RX ADMIN — GABAPENTIN 300 MILLIGRAM(S): 400 CAPSULE ORAL at 13:40

## 2019-10-01 RX ADMIN — ESCITALOPRAM OXALATE 10 MILLIGRAM(S): 10 TABLET, FILM COATED ORAL at 11:27

## 2019-10-01 RX ADMIN — Medication 100 MILLIGRAM(S): at 17:43

## 2019-10-01 RX ADMIN — Medication 60 MILLIGRAM(S): at 17:42

## 2019-10-01 RX ADMIN — GABAPENTIN 300 MILLIGRAM(S): 400 CAPSULE ORAL at 21:55

## 2019-10-01 RX ADMIN — GABAPENTIN 300 MILLIGRAM(S): 400 CAPSULE ORAL at 05:52

## 2019-10-01 RX ADMIN — Medication 60 MILLIGRAM(S): at 17:44

## 2019-10-01 RX ADMIN — ENOXAPARIN SODIUM 40 MILLIGRAM(S): 100 INJECTION SUBCUTANEOUS at 11:26

## 2019-10-01 RX ADMIN — Medication 1 SPRAY(S): at 17:42

## 2019-10-01 RX ADMIN — Medication 50 MILLIGRAM(S): at 21:55

## 2019-10-01 RX ADMIN — Medication 100 MILLIGRAM(S): at 05:52

## 2019-10-01 RX ADMIN — Medication 81 MILLIGRAM(S): at 11:26

## 2019-10-01 RX ADMIN — TRAMADOL HYDROCHLORIDE 50 MILLIGRAM(S): 50 TABLET ORAL at 12:15

## 2019-10-01 RX ADMIN — TRAMADOL HYDROCHLORIDE 50 MILLIGRAM(S): 50 TABLET ORAL at 11:47

## 2019-10-01 RX ADMIN — ATORVASTATIN CALCIUM 20 MILLIGRAM(S): 80 TABLET, FILM COATED ORAL at 21:55

## 2019-10-01 RX ADMIN — Medication 0.5 MILLIGRAM(S): at 23:17

## 2019-10-01 RX ADMIN — BUDESONIDE AND FORMOTEROL FUMARATE DIHYDRATE 2 PUFF(S): 160; 4.5 AEROSOL RESPIRATORY (INHALATION) at 07:57

## 2019-10-01 RX ADMIN — PANTOPRAZOLE SODIUM 40 MILLIGRAM(S): 20 TABLET, DELAYED RELEASE ORAL at 13:40

## 2019-10-01 RX ADMIN — AMLODIPINE BESYLATE 2.5 MILLIGRAM(S): 2.5 TABLET ORAL at 05:53

## 2019-10-01 RX ADMIN — Medication 1 SPRAY(S): at 05:52

## 2019-10-01 RX ADMIN — Medication 50 MILLIGRAM(S): at 14:17

## 2019-10-01 NOTE — PHYSICAL THERAPY INITIAL EVALUATION ADULT - ADDITIONAL COMMENTS
Has rollator,  but does not typically use it.  +2 steps to enter home, +26 steps to basement (patient say she does not have to go down to basement however)

## 2019-10-01 NOTE — PHYSICAL THERAPY INITIAL EVALUATION ADULT - GAIT DEVIATIONS NOTED, PT EVAL
increased time in double stance/decreased weight-shifting ability/decreased katelyn/decreased step length

## 2019-10-01 NOTE — PHYSICAL THERAPY INITIAL EVALUATION ADULT - GENERAL OBSERVATIONS, REHAB EVAL
13:30 - 14:00. Chart reviewed. Patient available to be seen for physical therapy, confirmed with nurse. Patient encountered already out of bed in chair. Denies pain at rest, would like to walk with PT now.

## 2019-10-02 PROCEDURE — 99232 SBSQ HOSP IP/OBS MODERATE 35: CPT

## 2019-10-02 RX ORDER — DIPHENHYDRAMINE HCL 50 MG
50 CAPSULE ORAL ONCE
Refills: 0 | Status: COMPLETED | OUTPATIENT
Start: 2019-10-02 | End: 2019-10-02

## 2019-10-02 RX ADMIN — QUETIAPINE FUMARATE 25 MILLIGRAM(S): 200 TABLET, FILM COATED ORAL at 21:15

## 2019-10-02 RX ADMIN — AMLODIPINE BESYLATE 2.5 MILLIGRAM(S): 2.5 TABLET ORAL at 05:52

## 2019-10-02 RX ADMIN — MONTELUKAST 10 MILLIGRAM(S): 4 TABLET, CHEWABLE ORAL at 11:54

## 2019-10-02 RX ADMIN — TRAMADOL HYDROCHLORIDE 50 MILLIGRAM(S): 50 TABLET ORAL at 21:15

## 2019-10-02 RX ADMIN — ATORVASTATIN CALCIUM 20 MILLIGRAM(S): 80 TABLET, FILM COATED ORAL at 21:15

## 2019-10-02 RX ADMIN — GABAPENTIN 300 MILLIGRAM(S): 400 CAPSULE ORAL at 15:39

## 2019-10-02 RX ADMIN — Medication 0.5 MILLIGRAM(S): at 23:22

## 2019-10-02 RX ADMIN — ESCITALOPRAM OXALATE 10 MILLIGRAM(S): 10 TABLET, FILM COATED ORAL at 11:54

## 2019-10-02 RX ADMIN — GABAPENTIN 300 MILLIGRAM(S): 400 CAPSULE ORAL at 05:52

## 2019-10-02 RX ADMIN — Medication 1 SPRAY(S): at 17:43

## 2019-10-02 RX ADMIN — TRAMADOL HYDROCHLORIDE 50 MILLIGRAM(S): 50 TABLET ORAL at 06:47

## 2019-10-02 RX ADMIN — Medication 50 MILLIGRAM(S): at 05:51

## 2019-10-02 RX ADMIN — Medication 81 MILLIGRAM(S): at 11:54

## 2019-10-02 RX ADMIN — Medication 50 MILLIGRAM(S): at 23:21

## 2019-10-02 RX ADMIN — PANTOPRAZOLE SODIUM 40 MILLIGRAM(S): 20 TABLET, DELAYED RELEASE ORAL at 05:51

## 2019-10-02 RX ADMIN — TRAMADOL HYDROCHLORIDE 50 MILLIGRAM(S): 50 TABLET ORAL at 23:21

## 2019-10-02 RX ADMIN — Medication 60 MILLIGRAM(S): at 05:51

## 2019-10-02 RX ADMIN — BUDESONIDE AND FORMOTEROL FUMARATE DIHYDRATE 2 PUFF(S): 160; 4.5 AEROSOL RESPIRATORY (INHALATION) at 07:49

## 2019-10-02 RX ADMIN — Medication 60 MILLIGRAM(S): at 17:43

## 2019-10-02 RX ADMIN — Medication 100 MILLIGRAM(S): at 05:52

## 2019-10-02 RX ADMIN — Medication 0.5 MILLIGRAM(S): at 07:49

## 2019-10-02 RX ADMIN — ENOXAPARIN SODIUM 40 MILLIGRAM(S): 100 INJECTION SUBCUTANEOUS at 11:54

## 2019-10-02 RX ADMIN — Medication 102 MILLIGRAM(S): at 10:29

## 2019-10-02 RX ADMIN — Medication 50 MILLIGRAM(S): at 15:39

## 2019-10-02 RX ADMIN — GABAPENTIN 300 MILLIGRAM(S): 400 CAPSULE ORAL at 21:15

## 2019-10-02 RX ADMIN — Medication 60 MILLIGRAM(S): at 05:52

## 2019-10-02 RX ADMIN — Medication 1 SPRAY(S): at 05:51

## 2019-10-02 NOTE — PROGRESS NOTE ADULT - ASSESSMENT
Patient is a 74 year old female with a PMHx of Angioedema secondary to Losartan and Verapamil, CHF, HTN, COPD/Emphysema on O2 4L prn since 10/2019, HCM, FLAVIO on CPAP, PVD, TIA, Bipolar type 1 presenting for swelling of the tongue. Suspect Angioedema secondary to Trelegy Ellipta.     # Angioedema   - Patient recently started on Trelegy Ellipta by her pulmonologist, developed Angioedema   - still on and off with tongue swelling, continue Benadryl, Solumedrol, will switch to PO Benadryl  - Epinephrine PRN, will dc with Epi pen Rx   - Evaluated by S&S, can be started on puree and increase as tolerated (as comfortable for the patient)   - Op eval by allergologist    # Hx of COPD on home O2  - Cont with Symbicort for now and on discharge as well duonebs PRN (per patient request) and Singulair   - Can follow up with Dr. Chavez on her scheduled appointment on 10/29/2019    # Chronic diastolic CHF, stable  - Cont with Lasix and Metoprolol, ASA, Statins  - Low Na diet    # Hx of HTN  - Cont with Metoprolol and Amlodipine     # Hx of Depression and Anxiety  - Cont with Escitalopram, Ativan, and Seroquel     #Diet: Puree, advance as tolerated  #Activity: Out of bed to chair  #DVT PPx: Lovenox subQ  #GI PPx: Not indicated    #Progress Note Handoff  Pending clinical improvement of angioedema  Consults: none  Tests: none  Family Discussion: not needed  Future Disposition: home, anticipated dc in 24-48 hrs

## 2019-10-02 NOTE — PROGRESS NOTE ADULT - SUBJECTIVE AND OBJECTIVE BOX
SHAUN COATES    Patient is a 74y old  Female who presents with a chief complaint of angioedema (30 Sep 2019 12:30)    HPI:  74 year old female with a PMHx of Angioedema secondary to Losartan and Verapamil, CHF, HTN, COPD/Emphysema on O2 4L prn since 10/2019, HCM, FLAVIO on CPAP, PVD, TIA, Bipolar type 1 presenting for swelling of the tongue of 1 day duration. Patient reports that it started right after she took Trelecy at 2pm yesterday which was newly prescribed. Patient reports that she called her PMD who recommended her to take Benadryl. Her symptoms improved after she took Benadryl 50mg yesterday. Today she reports that the tongue swelling has worsened and it is associated with changes in her phonation. Patient denies hoarseness, sore throat, lip swelling, dysphagia, odynophagia, gagging, inability to swallow, drooling, shortness of breath, cough or wheezing. Patient denies fevers, chills, nausea, vomiting, chest pain, palpitations, abdominal pain, diarrhea, lower extremity pain/swelling/erythema.    ED: Solumedrol 125mg IV, Epinephrine 0.3mg IM x2, Diphendydramine 50mg IV once, Famotidine 20mg IV, NS 500cc (29 Sep 2019 17:00)    INTERVAL HPI/OVERNIGHT EVENTS: pt c/o swelling of her tongue    CONSTITUTIONAL: No weakness, fevers or chills  EYES/ENT: No visual changes;  No vertigo or throat pain   NECK: No pain or stiffness  RESPIRATORY: No cough, wheezing, hemoptysis; No shortness of breath  CARDIOVASCULAR: No chest pain or palpitations  GASTROINTESTINAL: No abdominal or epigastric pain. No nausea, vomiting, or hematemesis; No diarrhea or constipation. No melena or hematochezia.  GENITOURINARY: No dysuria, frequency or hematuria  NEUROLOGICAL: No numbness or weakness  SKIN: No itching, rashes     PHYSICAL EXAM:  T(C): 36.3, Max: 36.3 (10-01-19 @ 22:09)  HR: 54 (54 - 69)  BP: 131/60 (112/55 - 141/64)  RR: 16 (16 - 16)  SpO2: --    GENERAL: NAD  PULMONARY/CHEST: No rales, rhonchi, wheezing, mild basilar crackles   CARDIOVASC: Regular rate and rhythm; systolic murmur  GI/ABDOMEN: Soft, Nontender, Nondistended; Bowel sounds present  EXTREMITIES:  2+ Peripheral Pulses, No clubbing, cyanosis, or edema, no deformity. No calf tenderness b/l.  SKIN: No rashes or lesions  NERVOUS SYSTEM:  Alert & Oriented X3, no focal deficit     LABS: no labs today      MEDICATIONS  (STANDING):  amLODIPine   Tablet 2.5 milliGRAM(s) Oral daily  aspirin enteric coated 81 milliGRAM(s) Oral daily  atorvastatin 20 milliGRAM(s) Oral at bedtime  buDESOnide 160 MICROgram(s)/formoterol 4.5 MICROgram(s) Inhaler 2 Puff(s) Inhalation two times a day  diphenhydrAMINE   Injectable 50 milliGRAM(s) IV Push every 8 hours  enoxaparin Injectable 40 milliGRAM(s) SubCutaneous daily  escitalopram 10 milliGRAM(s) Oral daily  fluticasone propionate 50 MICROgram(s)/spray Nasal Spray 1 Spray(s) Both Nostrils two times a day  furosemide    Tablet 60 milliGRAM(s) Oral two times a day  gabapentin 300 milliGRAM(s) Oral three times a day  influenza   Vaccine 0.5 milliLiter(s) IntraMuscular once  methylPREDNISolone sodium succinate Injectable 60 milliGRAM(s) IV Push every 12 hours  metoprolol tartrate 100 milliGRAM(s) Oral two times a day  montelukast 10 milliGRAM(s) Oral daily  pantoprazole    Tablet 40 milliGRAM(s) Oral before breakfast  QUEtiapine 25 milliGRAM(s) Oral at bedtime    MEDICATIONS  (PRN):  LORazepam     Tablet 0.5 milliGRAM(s) Oral two times a day PRN Agitation  traMADol 50 milliGRAM(s) Oral every 6 hours PRN Severe Pain (7 - 10)

## 2019-10-03 ENCOUNTER — TRANSCRIPTION ENCOUNTER (OUTPATIENT)
Age: 74
End: 2019-10-03

## 2019-10-03 VITALS
DIASTOLIC BLOOD PRESSURE: 65 MMHG | SYSTOLIC BLOOD PRESSURE: 156 MMHG | HEART RATE: 58 BPM | RESPIRATION RATE: 16 BRPM | TEMPERATURE: 98 F

## 2019-10-03 PROCEDURE — 99239 HOSP IP/OBS DSCHRG MGMT >30: CPT

## 2019-10-03 RX ORDER — EPINEPHRINE 0.3 MG/.3ML
0.3 INJECTION INTRAMUSCULAR; SUBCUTANEOUS
Qty: 1 | Refills: 0
Start: 2019-10-03 | End: 2019-11-01

## 2019-10-03 RX ORDER — DIPHENHYDRAMINE HCL 50 MG
1 CAPSULE ORAL
Qty: 30 | Refills: 0
Start: 2019-10-03 | End: 2019-10-12

## 2019-10-03 RX ORDER — FLUTICASONE FUROATE, UMECLIDINIUM BROMIDE AND VILANTEROL TRIFENATATE 200; 62.5; 25 UG/1; UG/1; UG/1
1 POWDER RESPIRATORY (INHALATION)
Qty: 0 | Refills: 0 | DISCHARGE

## 2019-10-03 RX ORDER — BUDESONIDE AND FORMOTEROL FUMARATE DIHYDRATE 160; 4.5 UG/1; UG/1
2 AEROSOL RESPIRATORY (INHALATION)
Qty: 0 | Refills: 0 | DISCHARGE
Start: 2019-10-03

## 2019-10-03 RX ORDER — FAMOTIDINE 10 MG/ML
1 INJECTION INTRAVENOUS
Qty: 0 | Refills: 0 | DISCHARGE

## 2019-10-03 RX ORDER — FUROSEMIDE 40 MG
3 TABLET ORAL
Qty: 0 | Refills: 0 | DISCHARGE
Start: 2019-10-03

## 2019-10-03 RX ADMIN — BUDESONIDE AND FORMOTEROL FUMARATE DIHYDRATE 2 PUFF(S): 160; 4.5 AEROSOL RESPIRATORY (INHALATION) at 07:37

## 2019-10-03 RX ADMIN — Medication 50 MILLIGRAM(S): at 05:28

## 2019-10-03 RX ADMIN — Medication 0.5 MILLIGRAM(S): at 07:37

## 2019-10-03 RX ADMIN — Medication 60 MILLIGRAM(S): at 05:27

## 2019-10-03 RX ADMIN — AMLODIPINE BESYLATE 2.5 MILLIGRAM(S): 2.5 TABLET ORAL at 05:27

## 2019-10-03 RX ADMIN — TRAMADOL HYDROCHLORIDE 50 MILLIGRAM(S): 50 TABLET ORAL at 11:18

## 2019-10-03 RX ADMIN — INFLUENZA VIRUS VACCINE 0.5 MILLILITER(S): 15; 15; 15; 15 SUSPENSION INTRAMUSCULAR at 11:19

## 2019-10-03 RX ADMIN — Medication 1 SPRAY(S): at 05:28

## 2019-10-03 RX ADMIN — Medication 100 MILLIGRAM(S): at 05:27

## 2019-10-03 RX ADMIN — PANTOPRAZOLE SODIUM 40 MILLIGRAM(S): 20 TABLET, DELAYED RELEASE ORAL at 06:12

## 2019-10-03 RX ADMIN — GABAPENTIN 300 MILLIGRAM(S): 400 CAPSULE ORAL at 13:44

## 2019-10-03 RX ADMIN — GABAPENTIN 300 MILLIGRAM(S): 400 CAPSULE ORAL at 05:27

## 2019-10-03 RX ADMIN — MONTELUKAST 10 MILLIGRAM(S): 4 TABLET, CHEWABLE ORAL at 11:18

## 2019-10-03 RX ADMIN — ESCITALOPRAM OXALATE 10 MILLIGRAM(S): 10 TABLET, FILM COATED ORAL at 11:18

## 2019-10-03 RX ADMIN — Medication 81 MILLIGRAM(S): at 11:18

## 2019-10-03 RX ADMIN — TRAMADOL HYDROCHLORIDE 50 MILLIGRAM(S): 50 TABLET ORAL at 11:23

## 2019-10-03 NOTE — DISCHARGE NOTE PROVIDER - HOSPITAL COURSE
74 year old female with a PMHx of Angioedema secondary to Losartan and Verapamil, CHF, HTN, COPD/Emphysema on O2 4L prn since 10/2019, HCM, FLAVIO on CPAP, PVD, TIA, Bipolar type 1 presenting for swelling of the tongue of 1 day duration. Patient reports that it started right after she took Trelecy at 2pm yesterday which was newly prescribed. Patient reports that she called her PMD who recommended her to take Benadryl, her symptoms improved after she took Benadryl, but later he reports that the tongue swelling has worsened and it is associated with changes in her phonation. Pt received Solumedrol 125mg IV, Epinephrine 0.3mg IM x2, Diphendydramine 50mg IV once, Famotidine 20mg IV. Her symptoms improved, she did not require assisted ventilation.

## 2019-10-03 NOTE — DISCHARGE NOTE PROVIDER - PROVIDER TOKENS
PROVIDER:[TOKEN:[[id number]]],PROVIDER:[TOKEN:[[id number]]],PROVIDER:[TOKEN:[[id number]]]
no

## 2019-10-03 NOTE — DISCHARGE NOTE PROVIDER - CARE PROVIDER_API CALL
Carl Bishop)  Cardiovascular Disease; Internal Medicine  [address] [place] [address]  Phone: [phone]  Fax: [phone]  Follow Up Time:     Milad Aguilar)  Family Medicine  [address] [place] [address]  Phone: [phone]  Fax: [phone]  Follow Up Time:     Aye Flanagan)  Allergy and Immunology; Pediatrics  [address] [place] [address], [facility] [address]  [place] [address]  Phone: [phone]  Fax: [phone]  Follow Up Time:

## 2019-10-03 NOTE — PROGRESS NOTE ADULT - SUBJECTIVE AND OBJECTIVE BOX
SHAUN COATES    Patient is a 74y old  Female who presents with a chief complaint of angioedema (03 Oct 2019 09:39)    HPI:  74 year old female with a PMHx of Angioedema secondary to Losartan and Verapamil, CHF, HTN, COPD/Emphysema on O2 4L prn since 10/2019, HCM, FLAVIO on CPAP, PVD, TIA, Bipolar type 1 presenting for swelling of the tongue of 1 day duration. Patient reports that it started right after she took Trelecy at 2pm yesterday which was newly prescribed. Patient reports that she called her PMD who recommended her to take Benadryl. Her symptoms improved after she took Benadryl 50mg yesterday. Today she reports that the tongue swelling has worsened and it is associated with changes in her phonation. Patient denies hoarseness, sore throat, lip swelling, dysphagia, odynophagia, gagging, inability to swallow, drooling, shortness of breath, cough or wheezing. Patient denies fevers, chills, nausea, vomiting, chest pain, palpitations, abdominal pain, diarrhea, lower extremity pain/swelling/erythema.    ED: Solumedrol 125mg IV, Epinephrine 0.3mg IM x2, Diphendydramine 50mg IV once, Famotidine 20mg IV, NS 500cc (29 Sep 2019 17:00)    INTERVAL HPI/OVERNIGHT EVENTS: no events, pt feels better    ROS: All ROS negative    PHYSICAL EXAM:  T(C): 36.4, Max: 36.4 (10-03-19 @ 05:41)  HR: 59 (54 - 65)  BP: 149/84 (112/56 - 149/84)  RR: 16 (16 - 16)  SpO2: --    GENERAL: NAD  PULMONARY/CHEST: No rales, rhonchi, wheezing, small basilar crackles  CARDIOVASC: Regular rate and rhythm; systolic murmur  GI/ABDOMEN: Soft, Nontender, Nondistended; Bowel sounds present  EXTREMITIES:  2+ Peripheral Pulses, No clubbing, cyanosis, or edema, no deformity. No calf tenderness b/l.  NERVOUS SYSTEM:  Alert & Oriented X3, no focal deficit     LABS: no new labs      MEDICATIONS  (STANDING):  amLODIPine   Tablet 2.5 milliGRAM(s) Oral daily  aspirin enteric coated 81 milliGRAM(s) Oral daily  atorvastatin 20 milliGRAM(s) Oral at bedtime  buDESOnide 160 MICROgram(s)/formoterol 4.5 MICROgram(s) Inhaler 2 Puff(s) Inhalation two times a day  diphenhydrAMINE   Injectable 50 milliGRAM(s) IV Push every 8 hours  enoxaparin Injectable 40 milliGRAM(s) SubCutaneous daily  escitalopram 10 milliGRAM(s) Oral daily  fluticasone propionate 50 MICROgram(s)/spray Nasal Spray 1 Spray(s) Both Nostrils two times a day  furosemide    Tablet 60 milliGRAM(s) Oral two times a day  gabapentin 300 milliGRAM(s) Oral three times a day  influenza   Vaccine 0.5 milliLiter(s) IntraMuscular once  methylPREDNISolone sodium succinate Injectable 60 milliGRAM(s) IV Push every 12 hours  metoprolol tartrate 100 milliGRAM(s) Oral two times a day  montelukast 10 milliGRAM(s) Oral daily  pantoprazole    Tablet 40 milliGRAM(s) Oral before breakfast  QUEtiapine 25 milliGRAM(s) Oral at bedtime    MEDICATIONS  (PRN):  LORazepam     Tablet 0.5 milliGRAM(s) Oral two times a day PRN Agitation  traMADol 50 milliGRAM(s) Oral every 6 hours PRN Severe Pain (7 - 10)

## 2019-10-03 NOTE — DISCHARGE NOTE PROVIDER - NSDCCPCAREPLAN_GEN_ALL_CORE_FT
PRINCIPAL DISCHARGE DIAGNOSIS  Diagnosis: Tongue swelling  Assessment and Plan of Treatment: avoid Trelegy Ellipta, avoid known triggers. Follow up with allergy specialist.      SECONDARY DISCHARGE DIAGNOSES  Diagnosis: History of angioedema  Assessment and Plan of Treatment:

## 2019-10-03 NOTE — DISCHARGE NOTE PROVIDER - CARE PROVIDERS DIRECT ADDRESSES
[email or web address]

## 2019-10-03 NOTE — PROGRESS NOTE ADULT - ASSESSMENT
Patient is a 74 year old female with a PMHx of Angioedema secondary to Losartan and Verapamil, CHF, HTN, COPD/Emphysema on O2 4L prn since 10/2019, HCM, FLAVIO on CPAP, PVD, TIA, Bipolar type 1 presenting for swelling of the tongue. Suspect Angioedema secondary to Trelegy Ellipta.     # Angioedema - improved  - Patient recently started on Trelegy Ellipta by her pulmonologist, developed Angioedema   - continue Prednisone for 3 more days, Benadryl PRN, will give Rx for Epi pen   - advised to advance diet as tolerated   - Op eval by allergologist    # Hx of COPD on home O2  - Cont with Symbicort for now and on discharge as well duonebs PRN (per patient request) and Singulair   - Can follow up with Dr. Chavez on her scheduled appointment on 10/29/2019    # Chronic diastolic CHF, stable  - Cont with Lasix and Metoprolol, ASA, Statins  - Low Na diet    # Hx of HTN  - Cont with Metoprolol and Amlodipine     # Hx of Depression and Anxiety  - Cont with Escitalopram, Ativan, and Seroquel     Flu shot requested and given    Pt is clinically stable for discharge home today.

## 2019-10-03 NOTE — DISCHARGE NOTE NURSING/CASE MANAGEMENT/SOCIAL WORK - PATIENT PORTAL LINK FT
You can access the FollowMyHealth Patient Portal offered by [facility] by registering at the following website: [email or web address]. By joining [employer]’s FollowMyHealth portal, you will also be able to view your health information using other applications (apps) compatible with our system.

## 2019-10-08 DIAGNOSIS — Z79.82 LONG TERM (CURRENT) USE OF ASPIRIN: ICD-10-CM

## 2019-10-08 DIAGNOSIS — Z23 ENCOUNTER FOR IMMUNIZATION: ICD-10-CM

## 2019-10-08 DIAGNOSIS — J43.9 EMPHYSEMA, UNSPECIFIED: ICD-10-CM

## 2019-10-08 DIAGNOSIS — Z87.891 PERSONAL HISTORY OF NICOTINE DEPENDENCE: ICD-10-CM

## 2019-10-08 DIAGNOSIS — F31.9 BIPOLAR DISORDER, UNSPECIFIED: ICD-10-CM

## 2019-10-08 DIAGNOSIS — T78.3XXA ANGIONEUROTIC EDEMA, INITIAL ENCOUNTER: ICD-10-CM

## 2019-10-08 DIAGNOSIS — T50.995A ADVERSE EFFECT OF OTHER DRUGS, MEDICAMENTS AND BIOLOGICAL SUBSTANCES, INITIAL ENCOUNTER: ICD-10-CM

## 2019-10-08 DIAGNOSIS — Z99.89 DEPENDENCE ON OTHER ENABLING MACHINES AND DEVICES: ICD-10-CM

## 2019-10-08 DIAGNOSIS — Z88.8 ALLERGY STATUS TO OTHER DRUGS, MEDICAMENTS AND BIOLOGICAL SUBSTANCES STATUS: ICD-10-CM

## 2019-10-08 DIAGNOSIS — Z88.5 ALLERGY STATUS TO NARCOTIC AGENT: ICD-10-CM

## 2019-10-08 DIAGNOSIS — G47.33 OBSTRUCTIVE SLEEP APNEA (ADULT) (PEDIATRIC): ICD-10-CM

## 2019-10-08 DIAGNOSIS — F41.9 ANXIETY DISORDER, UNSPECIFIED: ICD-10-CM

## 2019-10-08 DIAGNOSIS — I50.32 CHRONIC DIASTOLIC (CONGESTIVE) HEART FAILURE: ICD-10-CM

## 2019-10-08 DIAGNOSIS — Z86.73 PERSONAL HISTORY OF TRANSIENT ISCHEMIC ATTACK (TIA), AND CEREBRAL INFARCTION WITHOUT RESIDUAL DEFICITS: ICD-10-CM

## 2019-10-08 DIAGNOSIS — M40.209 UNSPECIFIED KYPHOSIS, SITE UNSPECIFIED: ICD-10-CM

## 2019-10-08 DIAGNOSIS — I73.9 PERIPHERAL VASCULAR DISEASE, UNSPECIFIED: ICD-10-CM

## 2019-10-08 DIAGNOSIS — I11.0 HYPERTENSIVE HEART DISEASE WITH HEART FAILURE: ICD-10-CM

## 2019-10-08 DIAGNOSIS — E78.5 HYPERLIPIDEMIA, UNSPECIFIED: ICD-10-CM

## 2019-10-08 DIAGNOSIS — I42.2 OTHER HYPERTROPHIC CARDIOMYOPATHY: ICD-10-CM

## 2019-10-12 ENCOUNTER — EMERGENCY (EMERGENCY)
Facility: HOSPITAL | Age: 74
LOS: 0 days | Discharge: HOME | End: 2019-10-13
Attending: EMERGENCY MEDICINE | Admitting: EMERGENCY MEDICINE
Payer: MEDICARE

## 2019-10-12 DIAGNOSIS — Z79.82 LONG TERM (CURRENT) USE OF ASPIRIN: ICD-10-CM

## 2019-10-12 DIAGNOSIS — Z86.73 PERSONAL HISTORY OF TRANSIENT ISCHEMIC ATTACK (TIA), AND CEREBRAL INFARCTION WITHOUT RESIDUAL DEFICITS: ICD-10-CM

## 2019-10-12 DIAGNOSIS — Z79.52 LONG TERM (CURRENT) USE OF SYSTEMIC STEROIDS: ICD-10-CM

## 2019-10-12 DIAGNOSIS — Z88.8 ALLERGY STATUS TO OTHER DRUGS, MEDICAMENTS AND BIOLOGICAL SUBSTANCES STATUS: ICD-10-CM

## 2019-10-12 DIAGNOSIS — I11.0 HYPERTENSIVE HEART DISEASE WITH HEART FAILURE: ICD-10-CM

## 2019-10-12 DIAGNOSIS — R06.2 WHEEZING: ICD-10-CM

## 2019-10-12 DIAGNOSIS — Z90.49 ACQUIRED ABSENCE OF OTHER SPECIFIED PARTS OF DIGESTIVE TRACT: Chronic | ICD-10-CM

## 2019-10-12 DIAGNOSIS — I50.9 HEART FAILURE, UNSPECIFIED: ICD-10-CM

## 2019-10-12 DIAGNOSIS — Z88.5 ALLERGY STATUS TO NARCOTIC AGENT: ICD-10-CM

## 2019-10-12 DIAGNOSIS — J44.9 CHRONIC OBSTRUCTIVE PULMONARY DISEASE, UNSPECIFIED: ICD-10-CM

## 2019-10-12 DIAGNOSIS — R60.0 LOCALIZED EDEMA: ICD-10-CM

## 2019-10-12 PROCEDURE — 99285 EMERGENCY DEPT VISIT HI MDM: CPT

## 2019-10-13 VITALS
TEMPERATURE: 99 F | OXYGEN SATURATION: 98 % | RESPIRATION RATE: 18 BRPM | HEIGHT: 62 IN | WEIGHT: 229.94 LBS | HEART RATE: 71 BPM

## 2019-10-13 VITALS
DIASTOLIC BLOOD PRESSURE: 83 MMHG | HEART RATE: 77 BPM | SYSTOLIC BLOOD PRESSURE: 136 MMHG | OXYGEN SATURATION: 98 % | TEMPERATURE: 98 F | RESPIRATION RATE: 20 BRPM

## 2019-10-13 PROBLEM — I50.9 HEART FAILURE, UNSPECIFIED: Chronic | Status: ACTIVE | Noted: 2019-09-29

## 2019-10-13 PROBLEM — G47.33 OBSTRUCTIVE SLEEP APNEA (ADULT) (PEDIATRIC): Chronic | Status: ACTIVE | Noted: 2019-09-29

## 2019-10-13 LAB
ALBUMIN SERPL ELPH-MCNC: 4.2 G/DL — SIGNIFICANT CHANGE UP (ref 3.5–5.2)
ALP SERPL-CCNC: 129 U/L — HIGH (ref 30–115)
ALT FLD-CCNC: 22 U/L — SIGNIFICANT CHANGE UP (ref 0–41)
ANION GAP SERPL CALC-SCNC: 16 MMOL/L — HIGH (ref 7–14)
AST SERPL-CCNC: 27 U/L — SIGNIFICANT CHANGE UP (ref 0–41)
BILIRUB SERPL-MCNC: 0.2 MG/DL — SIGNIFICANT CHANGE UP (ref 0.2–1.2)
BUN SERPL-MCNC: 34 MG/DL — HIGH (ref 10–20)
CALCIUM SERPL-MCNC: 9.2 MG/DL — SIGNIFICANT CHANGE UP (ref 8.5–10.1)
CHLORIDE SERPL-SCNC: 99 MMOL/L — SIGNIFICANT CHANGE UP (ref 98–110)
CO2 SERPL-SCNC: 26 MMOL/L — SIGNIFICANT CHANGE UP (ref 17–32)
CREAT SERPL-MCNC: 0.8 MG/DL — SIGNIFICANT CHANGE UP (ref 0.7–1.5)
GLUCOSE SERPL-MCNC: 101 MG/DL — HIGH (ref 70–99)
HCT VFR BLD CALC: 39.1 % — SIGNIFICANT CHANGE UP (ref 37–47)
HGB BLD-MCNC: 12.7 G/DL — SIGNIFICANT CHANGE UP (ref 12–16)
MCHC RBC-ENTMCNC: 31.7 PG — HIGH (ref 27–31)
MCHC RBC-ENTMCNC: 32.5 G/DL — SIGNIFICANT CHANGE UP (ref 32–37)
MCV RBC AUTO: 97.5 FL — SIGNIFICANT CHANGE UP (ref 81–99)
NRBC # BLD: 0 /100 WBCS — SIGNIFICANT CHANGE UP (ref 0–0)
NT-PROBNP SERPL-SCNC: 665 PG/ML — HIGH (ref 0–300)
PLATELET # BLD AUTO: 222 K/UL — SIGNIFICANT CHANGE UP (ref 130–400)
POTASSIUM SERPL-MCNC: 3.6 MMOL/L — SIGNIFICANT CHANGE UP (ref 3.5–5)
POTASSIUM SERPL-SCNC: 3.6 MMOL/L — SIGNIFICANT CHANGE UP (ref 3.5–5)
PROT SERPL-MCNC: 7 G/DL — SIGNIFICANT CHANGE UP (ref 6–8)
RBC # BLD: 4.01 M/UL — LOW (ref 4.2–5.4)
RBC # FLD: 14.8 % — HIGH (ref 11.5–14.5)
SODIUM SERPL-SCNC: 141 MMOL/L — SIGNIFICANT CHANGE UP (ref 135–146)
TROPONIN T SERPL-MCNC: <0.01 NG/ML — SIGNIFICANT CHANGE UP
WBC # BLD: 7.84 K/UL — SIGNIFICANT CHANGE UP (ref 4.8–10.8)
WBC # FLD AUTO: 7.84 K/UL — SIGNIFICANT CHANGE UP (ref 4.8–10.8)

## 2019-10-13 PROCEDURE — 71045 X-RAY EXAM CHEST 1 VIEW: CPT | Mod: 26

## 2019-10-13 RX ORDER — IPRATROPIUM/ALBUTEROL SULFATE 18-103MCG
3 AEROSOL WITH ADAPTER (GRAM) INHALATION ONCE
Refills: 0 | Status: COMPLETED | OUTPATIENT
Start: 2019-10-13 | End: 2019-10-13

## 2019-10-13 RX ORDER — FAMOTIDINE 10 MG/ML
20 INJECTION INTRAVENOUS ONCE
Refills: 0 | Status: COMPLETED | OUTPATIENT
Start: 2019-10-13 | End: 2019-10-13

## 2019-10-13 RX ORDER — DIPHENHYDRAMINE HCL 50 MG
50 CAPSULE ORAL ONCE
Refills: 0 | Status: COMPLETED | OUTPATIENT
Start: 2019-10-13 | End: 2019-10-13

## 2019-10-13 RX ADMIN — Medication 125 MILLIGRAM(S): at 00:26

## 2019-10-13 RX ADMIN — Medication 50 MILLIGRAM(S): at 01:15

## 2019-10-13 RX ADMIN — FAMOTIDINE 100 MILLIGRAM(S): 10 INJECTION INTRAVENOUS at 01:15

## 2019-10-13 RX ADMIN — Medication 3 MILLILITER(S): at 00:27

## 2019-10-13 NOTE — ED PROVIDER NOTE - NSFOLLOWUPINSTRUCTIONS_ED_ALL_ED_FT
Follow up with you Primary medical Doctor and your Pulmonologist in 1-2 days     Chronic Obstructive Pulmonary Disease  ImageChronic obstructive pulmonary disease (COPD) is a long-term (chronic) condition that affects the lungs. COPD is a general term that can be used to describe many different lung problems that cause lung swelling (inflammation) and limit airflow, including chronic bronchitis and emphysema. If you have COPD, your lung function will probably never return to normal. In most cases, it gets worse over time. However, there are steps you can take to slow the progression of the disease and improve your quality of life.    What are the causes?  This condition may be caused by:    Smoking. This is the most common cause.  Certain genes passed down through families.    What increases the risk?  The following factors may make you more likely to develop this condition:    Secondhand smoke from cigarettes, pipes, or cigars.  Exposure to chemicals and other irritants such as fumes and dust in the work environment.  Chronic lung conditions or infections.    What are the signs or symptoms?  Symptoms of this condition include:    Shortness of breath, especially during physical activity.  Chronic cough with a large amount of thick mucus. Sometimes the cough may not have any mucus (dry cough).  Wheezing.  Rapid breaths.  Gray or bluish discoloration (cyanosis) of the skin, especially in your fingers, toes, or lips.  Feeling tired (fatigue).  Weight loss.  Chest tightness.  Frequent infections.  Episodes when breathing symptoms become much worse (exacerbations).  Swelling in the ankles, feet, or legs. This may occur in later stages of the disease.    How is this diagnosed?  This condition is diagnosed based on:    Your medical history.  A physical exam.    You may also have tests, including:    Lung (pulmonary) function tests. This may include a spirometry test, which measures your ability to exhale properly.  Chest X-ray.  CT scan.  Blood tests.    How is this treated?  This condition may be treated with:    Medicines. These may include inhaled rescue medicines to treat acute exacerbations as well as long-term, or maintenance, medicines to prevent flare-ups of COPD.    Bronchodilators help treat COPD by dilating the airways to allow increased airflow and make your breathing more comfortable.  Steroids can reduce airway inflammation and help prevent exacerbations.    Smoking cessation. If you smoke, your health care provider may ask you to quit, and may also recommend therapy or replacement products to help you quit.  Pulmonary rehabilitation. This may involve working with a team of health care providers and specialists, such as respiratory, occupational, and physical therapists.  Exercise and physical activity. These are beneficial for nearly all people with COPD.  Nutrition therapy to gain weight, if you are underweight.  Oxygen. Supplemental oxygen therapy is only helpful if you have a low oxygen level in your blood (hypoxemia).  Lung surgery or transplant.  Palliative care. This is to help people with COPD feel comfortable when treatment is no longer working.    Follow these instructions at home:  Medicines     Take over-the-counter and prescription medicines (inhaled or pills) only as told by your health care provider.  Talk to your health care provider before taking any cough or allergy medicines. You may need to avoid certain medicines that dry out your airways.  Lifestyle     If you are a smoker, the most important thing that you can do is to stop smoking. Do not use any products that contain nicotine or tobacco, such as cigarettes and e-cigarettes. If you need help quitting, ask your health care provider. Continuing to smoke will cause the disease to progress faster.  Avoid exposure to things that irritate your lungs, such as smoke, chemicals, and fumes.  Stay active, but balance activity with periods of rest. Exercise and physical activity will help you maintain your ability to do things you want to do.  Learn and use relaxation techniques to manage stress and to control your breathing.  Get the right amount of sleep and get quality sleep. Most adults need 7 or more hours per night.  Eat healthy foods. Eating smaller, more frequent meals and resting before meals may help you maintain your strength.  Controlled breathing     Learn and use controlled breathing techniques as directed by your health care provider. Controlled breathing techniques include:    Pursed lip breathing. Start by breathing in (inhaling) through your nose for 1 second. Then, purse your lips as if you were going to whistle and breathe out (exhale) through the pursed lips for 2 seconds.  Diaphragmatic breathing. Start by putting one hand on your abdomen just above your waist. Inhale slowly through your nose. The hand on your abdomen should move out. Then purse your lips and exhale slowly. You should be able to feel the hand on your abdomen moving in as you exhale.    Controlled coughing     Learn and use controlled coughing to clear mucus from your lungs. Controlled coughing is a series of short, progressive coughs. The steps of controlled coughing are:    Lean your head slightly forward.    Breathe in deeply using diaphragmatic breathing.    Try to hold your breath for 3 seconds.    Keep your mouth slightly open while coughing twice.    Spit any mucus out into a tissue.    Rest and repeat the steps once or twice as needed.      General instructions     Make sure you receive all the vaccines that your health care provider recommends, especially the pneumococcal and influenza vaccines. Preventing infection and hospitalization is very important when you have COPD.  Use oxygen therapy and pulmonary rehabilitation if directed to by your health care provider. If you require home oxygen therapy, ask your health care provider whether you should purchase a pulse oximeter to measure your oxygen level at home.  Work with your health care provider to develop a COPD action plan. This will help you know what steps to take if your condition gets worse.  Keep other chronic health conditions under control as told by your health care provider.  Avoid extreme temperature and humidity changes.  Avoid contact with people who have an illness that spreads from person to person (is contagious), such as viral infections or pneumonia.  Keep all follow-up visits as told by your health care provider. This is important.  Contact a health care provider if:  You are coughing up more mucus than usual.  There is a change in the color or thickness of your mucus.  Your breathing is more labored than usual.  Your breathing is faster than usual.  You have difficulty sleeping.  You need to use your rescue medicines or inhalers more often than expected.  You have trouble doing routine activities such as getting dressed or walking around the house.  Get help right away if:  You have shortness of breath while you are resting.  You have shortness of breath that prevents you from:    Being able to talk.  Performing your usual physical activities.    You have chest pain lasting longer than 5 minutes.  Your skin color is more blue (cyanotic) than usual.  You measure low oxygen saturations for longer than 5 minutes with a pulse oximeter.  You have a fever.  You feel too tired to breathe normally.  Summary  Chronic obstructive pulmonary disease (COPD) is a long-term (chronic) condition that affects the lungs.  Your lung function will probably never return to normal. In most cases, it gets worse over time. However, there are steps you can take to slow the progression of the disease and improve your quality of life.  Treatment for COPD may include taking medicines, quitting smoking, pulmonary rehabilitation, and changes to diet and exercise. As the disease progresses, you may need oxygen therapy, a lung transplant, or palliative care.  To help manage your condition, do not smoke, avoid exposure to things that irritate your lungs, stay up to date on all vaccines, and follow your health care provider's instructions for taking medicines.  This information is not intended to replace advice given to you by your health care provider. Make sure you discuss any questions you have with your health care provider.

## 2019-10-13 NOTE — ED ADULT NURSE NOTE - SUICIDE SCREENING DEPRESSION
Alert/oriented x3. Full code. Patient awake most of the night. Up to BR x2 with SBA.  Patient denies pain.  Regular diet.  Phosphate replaced  per protocol. Octreotide gtt infusing at 50mcg/hr via  RUE PIV.  Although patient endorse area around where internal jugular line removed is tender, she denies pain/sob/chest pain or nausea.  Pt had uneventful night. [place] 0.   BC drawn overnight.  AM lab back : , K+  3.6 ,  Phosphorus 2.4, and hgb 7.6. Will need K+  &  Phosphorus replaced again..Plan:  Continue to monitor closely and notify MD of changes. Follow POC.     Negative

## 2019-10-13 NOTE — ED PROVIDER NOTE - OBJECTIVE STATEMENT
74 year old female past medical history of CHF and COPD. patient recently seen by Dr. Chavez of pul and placed on a new steroid inhaler. patient states that she used for the first time yesterday and states after use tongue felt weird and she took benadryl. patient states she got better but symptoms returned again and she took another bendadryl and it improved. patient states that tonight she started again and is now wheezing and having leg numbness/swelling. patient states that she still has feeling denies headache.

## 2019-10-13 NOTE — ED PROVIDER NOTE - PATIENT PORTAL LINK FT
You can access the FollowMyHealth Patient Portal offered by [facility] by registering at the following website: [email or web address]. By joining [employer]’s FollowMyHealth portal, you will also be able to view your health information using other applications (apps) compatible with our system.

## 2019-10-13 NOTE — ED PROVIDER NOTE - PMH
Allergic reaction    Bipolar 1 disorder    Congestive heart failure    COPD (chronic obstructive pulmonary disease)    Depression    Hypertension    FLAVIO on CPAP    Other cardiomyopathy    Other emphysema    PVD (peripheral vascular disease)    TIA (transient ischemic attack)    Transient ischemic attack

## 2019-10-13 NOTE — ED PROVIDER NOTE - PHYSICAL EXAMINATION
Physical Exam    Vital Signs: I have reviewed the initial vital signs.  Constitutional: Chronically Ill appearing, no acute distress  Eyes: Conjunctiva pink, Sclera clear, PERRLA, EOMI.  Mouth: tongue and throat no swelling, voice normal, speaking in full and complete sentences.   Cardiovascular: S1 and S2, regular rate, regular rhythm, well-perfused extremities, radial pulses equal and 2+  Respiratory: unlabored respiratory effort, + b/l wheezing  Gastrointestinal: soft, non-tender abdomen, no pulsatile mass, normal bowl sounds  Musculoskeletal: supple neck, + mild b/l  lower extremity edema, no midline tenderness  Integumentary: warm, dry, no rash  Neurologic: awake, alert, cranial nerves II-XII grossly intact, extremities’ motor and sensory functions grossly intact  Psychiatric: appropriate mood, appropriate affect

## 2019-10-13 NOTE — ED PROVIDER NOTE - PROGRESS NOTE DETAILS
Pt admits to improvement in breathing. Pt states she feels better and wants to go home. Results d/w patient and family and copies of results provided.  Pt instructed to return if any worsening symptoms or concerns.  Discussed with patient follow up and care plan. They verbalize understanding all discussed and all questions answered..

## 2019-10-13 NOTE — ED PROVIDER NOTE - NS ED ROS FT
Constitutional: (-) fever  Eyes/ENT: (-) blurry vision, (-) epistaxis  Cardiovascular: (-) chest pain, (-) syncope  Respiratory: (-) cough, (+) shortness of breath  Gastrointestinal: (-) vomiting, (-) diarrhea  Musculoskeletal: (-) neck pain, (-) back pain, (-) joint pain  Integumentary: (-) rash, (+) edema  Neurological: (-) headache, (-) altered mental status  Psychiatric: (-) hallucinations  Allergic/Immunologic: (-) pruritus

## 2019-10-13 NOTE — ED PROVIDER NOTE - ATTENDING CONTRIBUTION TO CARE
I personally evaluated the patient. I reviewed the Resident’s or Physician Assistant’s note (as assigned above), and agree with the findings and plan except as documented in my note.  Chart reviewed. H/O COPD, CHF, angioedema, presents to ER with SOB. No chest pain. Recently discharged from hospital. Exam shows alert patient in no distress, HEENT NCAT, no angioedema, lungs bilateral wheezing, RR S1S2, abdomen soft NT +BS, +edema.

## 2019-10-13 NOTE — ED PROVIDER NOTE - CLINICAL SUMMARY MEDICAL DECISION MAKING FREE TEXT BOX
Labs unremarkable. EKG no acute changes. CXR negative. Given nebs, Solumedrol, Benadryl and Pepcid with relief. Will D/C to follow up with PCP.

## 2019-10-13 NOTE — ED ADULT TRIAGE NOTE - CHIEF COMPLAINT QUOTE
pt complaining of difficulty breathing with tongue swelling and bilateral leg numbness since yesterday

## 2019-11-04 ENCOUNTER — INPATIENT (INPATIENT)
Facility: HOSPITAL | Age: 74
LOS: 0 days | Discharge: HOME HEALTH | End: 2019-11-05
Attending: HOSPITALIST | Admitting: HOSPITALIST
Payer: MEDICARE

## 2019-11-04 ENCOUNTER — EMERGENCY (EMERGENCY)
Facility: HOSPITAL | Age: 74
LOS: 0 days | Discharge: HOME | End: 2019-11-04
Admitting: HOSPITALIST

## 2019-11-04 VITALS
SYSTOLIC BLOOD PRESSURE: 141 MMHG | RESPIRATION RATE: 18 BRPM | TEMPERATURE: 97 F | DIASTOLIC BLOOD PRESSURE: 62 MMHG | OXYGEN SATURATION: 98 % | HEART RATE: 79 BPM | WEIGHT: 192.9 LBS
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
## 2019-11-04 DIAGNOSIS — T78.40XA ALLERGY, UNSPECIFIED, INITIAL ENCOUNTER: ICD-10-CM

## 2019-11-04 DIAGNOSIS — I50.9 HEART FAILURE, UNSPECIFIED: ICD-10-CM

## 2019-11-04 DIAGNOSIS — I10 ESSENTIAL (PRIMARY) HYPERTENSION: ICD-10-CM

## 2019-11-04 DIAGNOSIS — Z90.49 ACQUIRED ABSENCE OF OTHER SPECIFIED PARTS OF DIGESTIVE TRACT: Chronic | ICD-10-CM

## 2019-11-04 DIAGNOSIS — M25.551 PAIN IN RIGHT HIP: ICD-10-CM

## 2019-11-04 DIAGNOSIS — Z86.73 PERSONAL HISTORY OF TRANSIENT ISCHEMIC ATTACK (TIA), AND CEREBRAL INFARCTION WITHOUT RESIDUAL DEFICITS: ICD-10-CM
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
## 2019-11-04 DIAGNOSIS — Z87.891 PERSONAL HISTORY OF NICOTINE DEPENDENCE: ICD-10-CM

## 2019-11-04 DIAGNOSIS — R51 HEADACHE: ICD-10-CM

## 2019-11-04 DIAGNOSIS — J44.9 CHRONIC OBSTRUCTIVE PULMONARY DISEASE, UNSPECIFIED: ICD-10-CM

## 2019-11-04 LAB
ALBUMIN SERPL ELPH-MCNC: 3.7 G/DL — SIGNIFICANT CHANGE UP (ref 3.5–5.2)
ALP SERPL-CCNC: 111 U/L — SIGNIFICANT CHANGE UP (ref 30–115)
ALT FLD-CCNC: 15 U/L — SIGNIFICANT CHANGE UP (ref 0–41)
ANION GAP SERPL CALC-SCNC: 13 MMOL/L — SIGNIFICANT CHANGE UP (ref 7–14)
AST SERPL-CCNC: 29 U/L — SIGNIFICANT CHANGE UP (ref 0–41)
BASOPHILS # BLD AUTO: 0.03 K/UL — SIGNIFICANT CHANGE UP (ref 0–0.2)
BASOPHILS NFR BLD AUTO: 0.4 % — SIGNIFICANT CHANGE UP (ref 0–1)
BILIRUB SERPL-MCNC: 0.3 MG/DL — SIGNIFICANT CHANGE UP (ref 0.2–1.2)
BUN SERPL-MCNC: 22 MG/DL — HIGH (ref 10–20)
CALCIUM SERPL-MCNC: 8.8 MG/DL — SIGNIFICANT CHANGE UP (ref 8.5–10.1)
CHLORIDE SERPL-SCNC: 103 MMOL/L — SIGNIFICANT CHANGE UP (ref 98–110)
CO2 SERPL-SCNC: 24 MMOL/L — SIGNIFICANT CHANGE UP (ref 17–32)
CREAT SERPL-MCNC: 0.9 MG/DL — SIGNIFICANT CHANGE UP (ref 0.7–1.5)
EOSINOPHIL # BLD AUTO: 0.41 K/UL — SIGNIFICANT CHANGE UP (ref 0–0.7)
EOSINOPHIL NFR BLD AUTO: 5.6 % — SIGNIFICANT CHANGE UP (ref 0–8)
GLUCOSE SERPL-MCNC: 90 MG/DL — SIGNIFICANT CHANGE UP (ref 70–99)
HCT VFR BLD CALC: 35.5 % — LOW (ref 37–47)
HGB BLD-MCNC: 11.3 G/DL — LOW (ref 12–16)
IMM GRANULOCYTES NFR BLD AUTO: 0.6 % — HIGH (ref 0.1–0.3)
LYMPHOCYTES # BLD AUTO: 1.13 K/UL — LOW (ref 1.2–3.4)
LYMPHOCYTES # BLD AUTO: 15.5 % — LOW (ref 20.5–51.1)
MCHC RBC-ENTMCNC: 30.5 PG — SIGNIFICANT CHANGE UP (ref 27–31)
MCHC RBC-ENTMCNC: 31.8 G/DL — LOW (ref 32–37)
MCV RBC AUTO: 95.7 FL — SIGNIFICANT CHANGE UP (ref 81–99)
MONOCYTES # BLD AUTO: 0.72 K/UL — HIGH (ref 0.1–0.6)
MONOCYTES NFR BLD AUTO: 9.9 % — HIGH (ref 1.7–9.3)
NEUTROPHILS # BLD AUTO: 4.94 K/UL — SIGNIFICANT CHANGE UP (ref 1.4–6.5)
NEUTROPHILS NFR BLD AUTO: 68 % — SIGNIFICANT CHANGE UP (ref 42.2–75.2)
NRBC # BLD: 0 /100 WBCS — SIGNIFICANT CHANGE UP (ref 0–0)
NT-PROBNP SERPL-SCNC: 2576 PG/ML — HIGH (ref 0–300)
PLATELET # BLD AUTO: 220 K/UL — SIGNIFICANT CHANGE UP (ref 130–400)
POTASSIUM SERPL-MCNC: 4.7 MMOL/L — SIGNIFICANT CHANGE UP (ref 3.5–5)
POTASSIUM SERPL-SCNC: 4.7 MMOL/L — SIGNIFICANT CHANGE UP (ref 3.5–5)
PROT SERPL-MCNC: 6.2 G/DL — SIGNIFICANT CHANGE UP (ref 6–8)
RBC # BLD: 3.71 M/UL — LOW (ref 4.2–5.4)
RBC # FLD: 13.8 % — SIGNIFICANT CHANGE UP (ref 11.5–14.5)
SODIUM SERPL-SCNC: 140 MMOL/L — SIGNIFICANT CHANGE UP (ref 135–146)
WBC # BLD: 7.27 K/UL — SIGNIFICANT CHANGE UP (ref 4.8–10.8)
WBC # FLD AUTO: 7.27 K/UL — SIGNIFICANT CHANGE UP (ref 4.8–10.8)

## 2019-11-04 PROCEDURE — 70450 CT HEAD/BRAIN W/O DYE: CPT | Mod: 26

## 2019-11-04 PROCEDURE — 71260 CT THORAX DX C+: CPT | Mod: 26

## 2019-11-04 PROCEDURE — 72125 CT NECK SPINE W/O DYE: CPT | Mod: 26

## 2019-11-04 PROCEDURE — 99221 1ST HOSP IP/OBS SF/LOW 40: CPT | Mod: AI

## 2019-11-04 PROCEDURE — 73502 X-RAY EXAM HIP UNI 2-3 VIEWS: CPT | Mod: 26,RT

## 2019-11-04 PROCEDURE — 99285 EMERGENCY DEPT VISIT HI MDM: CPT

## 2019-11-04 PROCEDURE — 71045 X-RAY EXAM CHEST 1 VIEW: CPT | Mod: 26

## 2019-11-04 PROCEDURE — 74177 CT ABD & PELVIS W/CONTRAST: CPT | Mod: 26

## 2019-11-04 PROCEDURE — 72170 X-RAY EXAM OF PELVIS: CPT | Mod: 26,59

## 2019-11-04 PROCEDURE — 71046 X-RAY EXAM CHEST 2 VIEWS: CPT | Mod: 26

## 2019-11-04 RX ORDER — TRAMADOL HYDROCHLORIDE 50 MG/1
50 TABLET ORAL ONCE
Refills: 0 | Status: DISCONTINUED | OUTPATIENT
Start: 2019-11-04 | End: 2019-11-04

## 2019-11-04 RX ORDER — DIPHENHYDRAMINE HCL 50 MG
50 CAPSULE ORAL ONCE
Refills: 0 | Status: COMPLETED | OUTPATIENT
Start: 2019-11-04 | End: 2019-11-04

## 2019-11-04 RX ORDER — AZELASTINE 137 UG/1
0 SPRAY, METERED NASAL
Qty: 0 | Refills: 0 | DISCHARGE

## 2019-11-04 RX ORDER — QUETIAPINE FUMARATE 200 MG/1
0 TABLET, FILM COATED ORAL
Qty: 0 | Refills: 0 | DISCHARGE

## 2019-11-04 RX ORDER — EPINEPHRINE 0.3 MG/.3ML
0.3 INJECTION INTRAMUSCULAR; SUBCUTANEOUS ONCE
Refills: 0 | Status: COMPLETED | OUTPATIENT
Start: 2019-11-04 | End: 2019-11-04

## 2019-11-04 RX ORDER — DEXAMETHASONE 0.5 MG/5ML
10 ELIXIR ORAL ONCE
Refills: 0 | Status: COMPLETED | OUTPATIENT
Start: 2019-11-04 | End: 2019-11-04

## 2019-11-04 RX ADMIN — EPINEPHRINE 0.3 MILLIGRAM(S): 0.3 INJECTION INTRAMUSCULAR; SUBCUTANEOUS at 18:40

## 2019-11-04 RX ADMIN — TRAMADOL HYDROCHLORIDE 50 MILLIGRAM(S): 50 TABLET ORAL at 17:21

## 2019-11-04 RX ADMIN — Medication 50 MILLIGRAM(S): at 18:42

## 2019-11-04 RX ADMIN — Medication 102 MILLIGRAM(S): at 18:42

## 2019-11-04 NOTE — ED ADULT NURSE REASSESSMENT NOTE - NS ED NURSE REASSESS COMMENT FT1
1840- Pt returned from ct scan and c/o shortness of breath, pts lips became cyanotic  MD at bedside pt given epi 0.3 Im and was placed on bipap. 2nd IV inserted in L hand with # 20 angio.  1842- pt given Benadryl 50 mg IV and Decadron 10 mg IV

## 2019-11-04 NOTE — H&P ADULT - NSHPLABSRESULTS_GEN_ALL_CORE
11.3   7.27  )-----------( 220      ( 04 Nov 2019 17:06 )             35.5     11-04    140  |  103  |  22<H>  ----------------------------<  90  4.7   |  24  |  0.9    Ca    8.8      04 Nov 2019 17:06    TPro  6.2  /  Alb  3.7  /  TBili  0.3  /  DBili  x   /  AST  29  /  ALT  15  /  AlkPhos  111  11-04              Lactate Trend        CAPILLARY BLOOD GLUCOSE    < from: CT Chest w/ IV Cont (11.04.19 @ 18:18) >      EXAM:  CT ABDOMEN AND PELVIS IC        EXAM:  CT CHEST IC          PROCEDURE DATE:  11/04/2019      IMPRESSION:    1.  No CT evidence for acute intrathoracic or abdominopelvic pathology    SHILPA SAGASTUME M.D., RESIDENT RADIOLOGIST  This document has been electronically signed.  YOSI GORDILLO M.D., ATTENDING RADIOLOGIST  This document has been electronically signed. Nov 4 2019  7:50PM      < end of copied text >

## 2019-11-04 NOTE — ED ADULT TRIAGE NOTE - CHIEF COMPLAINT QUOTE
BIBA s/p fall from standing, patient explains she hit handle on cabinet with head, c/o head pain and right hip pain

## 2019-11-04 NOTE — ED PROVIDER NOTE - PROGRESS NOTE DETAILS
Patient required immediate imaging with IV Contrast secondary to trauma and poor historian. Will hydrate for procedure. Patient requires ct imaging due to emergent circumstances.

## 2019-11-04 NOTE — ED PROVIDER NOTE - CLINICAL SUMMARY MEDICAL DECISION MAKING FREE TEXT BOX
I personally evaluated the patient. I reviewed the Resident’s or Physician Assistant’s note (as assigned above), and agree with the findings and plan except as documented in my note. patient admitted for rehydration and observation. Patiente valuated for acute trauma and treated for anaphylaxis presumably secondary to IV contrast

## 2019-11-04 NOTE — ED PROVIDER NOTE - NS ED ROS FT
Constitutional: (-) fever  Eyes/ENT: (-) visual changes   Cardiovascular: (-) chest pain, (-) syncope  Respiratory: (-) cough, (-) shortness of breath  Gastrointestinal: (-) vomiting, (-) diarrhea  Genitourinary: (-) dysuria, (-) hesitancy, (-) frequency   Musculoskeletal: (-) neck pain, (-) back pain, R hip pain  Integumentary: (-) rash, (-) edema  Neurological: (+) headache, (-) altered mental status  Allergic/Immunologic: (-) pruritus

## 2019-11-04 NOTE — H&P ADULT - HISTORY OF PRESENT ILLNESS
[age]yo female who apparently presented to ER because of a fall is admitted after suspected allergic reaction to IV dye. Patient is now comfortable without any breathing (or other possible allergy related) symptoms

## 2019-11-04 NOTE — ED PROVIDER NOTE - ATTENDING CONTRIBUTION TO CARE
74 year old female, pmhx as stated in the chart, come sin with mechanical fall, patient states she tripped while ambulating, no cp/sob, no n/v/d, no loc. No weakness. Patient went for ct scan, patient developed anaphylaxis upon returning, given epi and symptoms improved, placed on bipap for hypoxia.     CONSTITUTIONAL: Well-developed; well-nourished; in no acute distress. Sitting up and providing appropriate history and physical examination  TRAUMA: Primary and Secondary surveys intact, GCS 15, no midline CTLS spine tenderness, Pelvis stable, + moving all extremities  SKIN: skin exam is warm and dry, no acute rash.  HEAD: Normocephalic; atraumatic.  EYES: PERRL, 3 mm bilateral, no nystagmus, EOM intact; conjunctiva and sclera clear.  ENT: No nasal discharge; airway clear.  NECK: Supple; non tender. + full passive ROM in all directions. No JVD  CARD: S1, S2 normal; no murmurs, gallops, or rubs. Regular rate and rhythm. + Symmetric Strong Pulses  RESP: No wheezes, rales or rhonchi. Good air movement bilaterally  ABD: soft; non-distended; non-tender. No Rebound, No Guarding, No signs of peritonitis, No CVA tenderness. No pulsatile abdominal mass. + Strong and Symmetric Pulses  EXT: Normal ROM. No clubbing, cyanosis or edema. Dp and Pt Pulses intact. Cap refill less than 3 seconds  NEURO: CN 2-12 intact, normal finger to nose, normal romberg, stable gait, no sensory or motor deficits, Alert, oriented, grossly unremarkable. No Focal deficits. GCS 15. NIH 0  PSYCH: Cooperative, appropriate.

## 2019-11-04 NOTE — H&P ADULT - PROBLEM SELECTOR PLAN 4
Based on communications with colleague. Would verify with patient when more awake. Currently no signs of chf

## 2019-11-04 NOTE — H&P ADULT - PROBLEM SELECTOR PLAN 2
Benadrol prn (also on inhaled steroids), may need to add IV contrast to allergy list Benadryl prn (also on inhaled steroids), may need to add IV contrast to allergy list

## 2019-11-04 NOTE — ED PROVIDER NOTE - PHYSICAL EXAMINATION
Gen: NAD, AOx3  Head: NCAT  HEENT: PERRL, oral mucosa moist, normal conjunctiva, oropharynx clear without exudate or erythema  Lung: CTAB, no respiratory distress, no wheezing, rales, rhonchi  CV: normal s1/s2, rrr, Normal perfusion, pulses 2+ throughout  Abd: soft, NTND, no CVA tenderness  Genitourinary: no pelvic tenderness  MSK: No edema, no visible deformities, full range of motion in all 4 extremities, tenderness with ROM on R hip  Neuro: CN II-XII grossly intact, No focal neurologic deficits, no nystagmus/pronator drift, coordination/sensation intact, strength 5/5 BUE/BLE  Skin: No rash   Psych: normal affect

## 2019-11-04 NOTE — ED PROVIDER NOTE - OBJECTIVE STATEMENT
73 yo female with a pmh of HTN, COPD, CHF, and TIA presents after a 75 yo female with a pmh of HTN, COPD, CHF, and TIA presents after a mechanical fall. pt states that she tripped in her kitchen hitting her head on the cabinet knob and falling onto her right side. she now states to have pain to her right hip that is worsened with movement and has not noted alleviating factors. she is experiencing a HA to the R temporal area with no radiation and worsened with palpation. denies any LOC. denies any other symptoms including fevers, chill, headache, recent illness/travel, cough, abdominal pain, chest pain, or SOB.

## 2019-11-05 ENCOUNTER — TRANSCRIPTION ENCOUNTER (OUTPATIENT)
Age: 74
End: 2019-11-05

## 2019-11-05 VITALS
HEART RATE: 80 BPM | RESPIRATION RATE: 16 BRPM | SYSTOLIC BLOOD PRESSURE: 106 MMHG | DIASTOLIC BLOOD PRESSURE: 54 MMHG | TEMPERATURE: 98 F

## 2019-11-05 DIAGNOSIS — I50.22 CHRONIC SYSTOLIC (CONGESTIVE) HEART FAILURE: ICD-10-CM
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
## 2019-11-05 DIAGNOSIS — T78.40XA ALLERGY, UNSPECIFIED, INITIAL ENCOUNTER: ICD-10-CM

## 2019-11-05 DIAGNOSIS — M25.559 PAIN IN UNSPECIFIED HIP: ICD-10-CM

## 2019-11-05 LAB
ALBUMIN SERPL ELPH-MCNC: 3.6 G/DL — SIGNIFICANT CHANGE UP (ref 3.5–5.2)
ALP SERPL-CCNC: 116 U/L — HIGH (ref 30–115)
ALT FLD-CCNC: 16 U/L — SIGNIFICANT CHANGE UP (ref 0–41)
ANION GAP SERPL CALC-SCNC: 15 MMOL/L — HIGH (ref 7–14)
AST SERPL-CCNC: 20 U/L — SIGNIFICANT CHANGE UP (ref 0–41)
BILIRUB SERPL-MCNC: 0.4 MG/DL — SIGNIFICANT CHANGE UP (ref 0.2–1.2)
BUN SERPL-MCNC: 22 MG/DL — HIGH (ref 10–20)
CALCIUM SERPL-MCNC: 9.3 MG/DL — SIGNIFICANT CHANGE UP (ref 8.5–10.1)
CHLORIDE SERPL-SCNC: 103 MMOL/L — SIGNIFICANT CHANGE UP (ref 98–110)
CO2 SERPL-SCNC: 22 MMOL/L — SIGNIFICANT CHANGE UP (ref 17–32)
CREAT SERPL-MCNC: 0.8 MG/DL — SIGNIFICANT CHANGE UP (ref 0.7–1.5)
GLUCOSE SERPL-MCNC: 143 MG/DL — HIGH (ref 70–99)
HCT VFR BLD CALC: 34.7 % — LOW (ref 37–47)
HGB BLD-MCNC: 11.3 G/DL — LOW (ref 12–16)
MCHC RBC-ENTMCNC: 30.7 PG — SIGNIFICANT CHANGE UP (ref 27–31)
MCHC RBC-ENTMCNC: 32.6 G/DL — SIGNIFICANT CHANGE UP (ref 32–37)
MCV RBC AUTO: 94.3 FL — SIGNIFICANT CHANGE UP (ref 81–99)
NRBC # BLD: 0 /100 WBCS — SIGNIFICANT CHANGE UP (ref 0–0)
PLATELET # BLD AUTO: 255 K/UL — SIGNIFICANT CHANGE UP (ref 130–400)
POTASSIUM SERPL-MCNC: 4.4 MMOL/L — SIGNIFICANT CHANGE UP (ref 3.5–5)
POTASSIUM SERPL-SCNC: 4.4 MMOL/L — SIGNIFICANT CHANGE UP (ref 3.5–5)
PROT SERPL-MCNC: 6.3 G/DL — SIGNIFICANT CHANGE UP (ref 6–8)
RBC # BLD: 3.68 M/UL — LOW (ref 4.2–5.4)
RBC # FLD: 13.4 % — SIGNIFICANT CHANGE UP (ref 11.5–14.5)
SODIUM SERPL-SCNC: 140 MMOL/L — SIGNIFICANT CHANGE UP (ref 135–146)
WBC # BLD: 6.36 K/UL — SIGNIFICANT CHANGE UP (ref 4.8–10.8)
WBC # FLD AUTO: 6.36 K/UL — SIGNIFICANT CHANGE UP (ref 4.8–10.8)

## 2019-11-05 PROCEDURE — 99239 HOSP IP/OBS DSCHRG MGMT >30: CPT

## 2019-11-05 RX ORDER — ATORVASTATIN CALCIUM 80 MG/1
20 TABLET, FILM COATED ORAL AT BEDTIME
Refills: 0 | Status: DISCONTINUED | OUTPATIENT
Start: 2019-11-05 | End: 2019-11-05

## 2019-11-05 RX ORDER — FLUTICASONE PROPIONATE 50 MCG
1 SPRAY, SUSPENSION NASAL
Refills: 0 | Status: DISCONTINUED | OUTPATIENT
Start: 2019-11-05 | End: 2019-11-05

## 2019-11-05 RX ORDER — TRAMADOL HYDROCHLORIDE 50 MG/1
50 TABLET ORAL EVERY 6 HOURS
Refills: 0 | Status: DISCONTINUED | OUTPATIENT
Start: 2019-11-05 | End: 2019-11-05

## 2019-11-05 RX ORDER — BUDESONIDE, MICRONIZED 100 %
0 POWDER (GRAM) MISCELLANEOUS
Qty: 0 | Refills: 0 | DISCHARGE

## 2019-11-05 RX ORDER — METOPROLOL TARTRATE 50 MG
100 TABLET ORAL
Refills: 0 | Status: DISCONTINUED | OUTPATIENT
Start: 2019-11-05 | End: 2019-11-05

## 2019-11-05 RX ORDER — QUETIAPINE FUMARATE 200 MG/1
100 TABLET, FILM COATED ORAL AT BEDTIME
Refills: 0 | Status: DISCONTINUED | OUTPATIENT
Start: 2019-11-05 | End: 2019-11-05

## 2019-11-05 RX ORDER — ASPIRIN/CALCIUM CARB/MAGNESIUM 324 MG
81 TABLET ORAL DAILY
Refills: 0 | Status: DISCONTINUED | OUTPATIENT
Start: 2019-11-05 | End: 2019-11-05

## 2019-11-05 RX ORDER — AMLODIPINE BESYLATE 2.5 MG/1
2.5 TABLET ORAL DAILY
Refills: 0 | Status: DISCONTINUED | OUTPATIENT
Start: 2019-11-05 | End: 2019-11-05

## 2019-11-05 RX ORDER — DIPHENHYDRAMINE HCL 50 MG
25 CAPSULE ORAL EVERY 4 HOURS
Refills: 0 | Status: DISCONTINUED | OUTPATIENT
Start: 2019-11-05 | End: 2019-11-05

## 2019-11-05 RX ORDER — MONTELUKAST 4 MG/1
10 TABLET, CHEWABLE ORAL AT BEDTIME
Refills: 0 | Status: DISCONTINUED | OUTPATIENT
Start: 2019-11-05 | End: 2019-11-05

## 2019-11-05 RX ORDER — FUROSEMIDE 40 MG
60 TABLET ORAL
Refills: 0 | Status: DISCONTINUED | OUTPATIENT
Start: 2019-11-05 | End: 2019-11-05

## 2019-11-05 RX ORDER — BUDESONIDE AND FORMOTEROL FUMARATE DIHYDRATE 160; 4.5 UG/1; UG/1
2 AEROSOL RESPIRATORY (INHALATION)
Refills: 0 | Status: DISCONTINUED | OUTPATIENT
Start: 2019-11-05 | End: 2019-11-05

## 2019-11-05 RX ORDER — FAMOTIDINE 10 MG/ML
40 INJECTION INTRAVENOUS AT BEDTIME
Refills: 0 | Status: DISCONTINUED | OUTPATIENT
Start: 2019-11-05 | End: 2019-11-05

## 2019-11-05 RX ORDER — ESCITALOPRAM OXALATE 10 MG/1
10 TABLET, FILM COATED ORAL DAILY
Refills: 0 | Status: DISCONTINUED | OUTPATIENT
Start: 2019-11-05 | End: 2019-11-05

## 2019-11-05 RX ORDER — GABAPENTIN 400 MG/1
300 CAPSULE ORAL THREE TIMES A DAY
Refills: 0 | Status: DISCONTINUED | OUTPATIENT
Start: 2019-11-05 | End: 2019-11-05

## 2019-11-05 RX ORDER — HEPARIN SODIUM 5000 [USP'U]/ML
5000 INJECTION INTRAVENOUS; SUBCUTANEOUS
Refills: 0 | Status: DISCONTINUED | OUTPATIENT
Start: 2019-11-05 | End: 2019-11-05

## 2019-11-05 RX ADMIN — TRAMADOL HYDROCHLORIDE 50 MILLIGRAM(S): 50 TABLET ORAL at 06:45

## 2019-11-05 RX ADMIN — AMLODIPINE BESYLATE 2.5 MILLIGRAM(S): 2.5 TABLET ORAL at 06:44

## 2019-11-05 RX ADMIN — Medication 1 SPRAY(S): at 06:44

## 2019-11-05 RX ADMIN — HEPARIN SODIUM 5000 UNIT(S): 5000 INJECTION INTRAVENOUS; SUBCUTANEOUS at 06:44

## 2019-11-05 RX ADMIN — Medication 60 MILLIGRAM(S): at 06:44

## 2019-11-05 RX ADMIN — Medication 81 MILLIGRAM(S): at 12:53

## 2019-11-05 RX ADMIN — Medication 100 MILLIGRAM(S): at 06:44

## 2019-11-05 RX ADMIN — BUDESONIDE AND FORMOTEROL FUMARATE DIHYDRATE 2 PUFF(S): 160; 4.5 AEROSOL RESPIRATORY (INHALATION) at 09:09

## 2019-11-05 RX ADMIN — ESCITALOPRAM OXALATE 10 MILLIGRAM(S): 10 TABLET, FILM COATED ORAL at 12:53

## 2019-11-05 RX ADMIN — GABAPENTIN 300 MILLIGRAM(S): 400 CAPSULE ORAL at 14:43

## 2019-11-05 RX ADMIN — Medication 0.5 MILLIGRAM(S): at 09:49

## 2019-11-05 RX ADMIN — GABAPENTIN 300 MILLIGRAM(S): 400 CAPSULE ORAL at 06:44

## 2019-11-05 RX ADMIN — TRAMADOL HYDROCHLORIDE 50 MILLIGRAM(S): 50 TABLET ORAL at 12:54

## 2019-11-05 RX ADMIN — TRAMADOL HYDROCHLORIDE 50 MILLIGRAM(S): 50 TABLET ORAL at 13:09

## 2019-11-05 NOTE — DISCHARGE NOTE PROVIDER - NSDCCPCAREPLAN_GEN_ALL_CORE_FT
PRINCIPAL DISCHARGE DIAGNOSIS  Diagnosis: Fall  Assessment and Plan of Treatment: mechanical fall      SECONDARY DISCHARGE DIAGNOSES  Diagnosis: Allergic reaction  Assessment and Plan of Treatment: to IV contrast.   resolved    Diagnosis: Hip pain  Assessment and Plan of Treatment: no fracture noted on XR

## 2019-11-05 NOTE — DISCHARGE NOTE NURSING/CASE MANAGEMENT/SOCIAL WORK - PATIENT PORTAL LINK FT
You can access the FollowMyHealth Patient Portal offered by [facility] by registering at the following website: [email or web address]. By joining [employer]’s FollowMyHealth portal, you will also be able to view your health information using other applications (apps) compatible with our system.

## 2019-11-05 NOTE — DISCHARGE NOTE PROVIDER - HOSPITAL COURSE
75yo female who apparently presented to ER because of a fall is admitted after suspected allergic reaction to IV dye. Patient is now comfortable without any breathing (or other possible allergy related) symptoms.    She had a mechanical fall, was sent in for imaging.  no acute fractures found or bodily injury but there was suspicion of IV dye allergy and was given epi, decadron, and benadryl and monitored overnight.      She is otherwise doing well and can be discharged back home with home care.  IV contrast added to her list of allergies.        #mechanical fall    #IV contrast allergy    #angioedema from losartan and verapamil    #chronic diastolic CHF    #HTN    #COPD on 4L home O2    #chronic hypoxic respiratory failure    #emphysema    #HCM    #FLAVIO on CPAP    #PVD    #hx TIA    #bipolar disorder        medically stable for discharge to home w/ home care    f/u PMD within 1 week            T(F): 96.8 (11-05-19 @ 06:54), Max: 98.1 (11-04-19 @ 21:28)    HR: 81 (11-05-19 @ 06:54)    BP: 137/63 (11-05-19 @ 06:54) (125/61 - 141/62)    RR: 18 (11-04-19 @ 22:50)    SpO2: 96% (11-04-19 @ 21:28)        GENERAL: NAD    HEENT: MMM    CHEST/LUNG: Good air entry, No wheezing    HEART: RRR, No murmurs    ABDOMEN: Soft, Bowel sounds present    EXTREMITIES:  no edema    NEURO: AOx3                                11.3     6.36  )-----------( 255      ( 05 Nov 2019 06:55 )               34.7         11-05        140  |  103  |  22<H>    ----------------------------<  143<H>    4.4   |  22  |  0.8        Ca    9.3      05 Nov 2019 06:55        TPro  6.3  /  Alb  3.6  /  TBili  0.4  /  DBili  x   /  AST  20  /  ALT  16  /  AlkPhos  116<H>  11-05                This is only a brief summary of the pt's hospital course.  Further details outlined in the EMR.        Total time:  45 min

## 2019-11-05 NOTE — DISCHARGE NOTE PROVIDER - CARE PROVIDER_API CALL
Milad Aguilar)  Family Medicine  [address] [place] [address]  Phone: [phone]  Fax: [phone]  Follow Up Time: 1 week

## 2019-11-05 NOTE — DISCHARGE NOTE NURSING/CASE MANAGEMENT/SOCIAL WORK - NSDCVIVACCINE_GEN_ALL_CORE_FT
Last Refill- 9/10/18        Last Visit- 9/26/18        Please Approve or Deny Medication Influenza , 2019/10/3 11:19 , Mona Martinez (RN)  Tdap , 2018/10/13 09:00 , Shanell Davis (CHARLES)

## 2019-11-05 NOTE — DISCHARGE NOTE PROVIDER - NSDCMRMEDTOKEN_GEN_ALL_CORE_FT
amLODIPine 2.5 mg oral tablet: 1 tab(s) orally once a day  aspirin 81 mg oral tablet: 1 tab(s) orally once a day  atorvastatin 20 mg oral tablet: 1 tab(s) orally once a day  Benadryl 25 mg oral tablet: 1 tab(s) orally every 8 hours, As Needed -for itching   budesonide-formoterol 160 mcg-4.5 mcg/inh inhalation aerosol: 2 puff(s) inhaled 2 times a day  escitalopram 10 mg oral tablet: 1 tab(s) orally once a day  fluticasone 50 mcg/inh inhalation powder: 1 puff(s) inhaled 2 times a day  furosemide 20 mg oral tablet: 3 tab(s) orally 2 times a day  gabapentin 300 mg oral capsule: 1 cap(s) orally 3 times a day  LORazepam 0.5 mg oral tablet: 1 tab(s) orally 2 times a day, As Needed for anxiety  Metoprolol Tartrate 100 mg oral tablet: 1 tab(s) orally 2 times a day  montelukast 10 mg oral tablet: 1 tab(s) orally once a day  Pepcid 40 mg oral tablet: 1 tab(s) orally once a day (at bedtime)  QUEtiapine 100 mg oral tablet:   Symbicort:   traMADol 50 mg oral tablet: 1 tab(s) orally every 6 hours, As needed, Severe Pain (7 - 10) MDD:4

## 2019-11-11 DIAGNOSIS — M25.551 PAIN IN RIGHT HIP: ICD-10-CM

## 2019-11-11 DIAGNOSIS — I73.9 PERIPHERAL VASCULAR DISEASE, UNSPECIFIED: ICD-10-CM

## 2019-11-11 DIAGNOSIS — Z91.81 HISTORY OF FALLING: ICD-10-CM

## 2019-11-11 DIAGNOSIS — Z87.891 PERSONAL HISTORY OF NICOTINE DEPENDENCE: ICD-10-CM

## 2019-11-11 DIAGNOSIS — T50.8X5A ADVERSE EFFECT OF DIAGNOSTIC AGENTS, INITIAL ENCOUNTER: ICD-10-CM

## 2019-11-11 DIAGNOSIS — T78.2XXA ANAPHYLACTIC SHOCK, UNSPECIFIED, INITIAL ENCOUNTER: ICD-10-CM

## 2019-11-11 DIAGNOSIS — T88.6XXA ANAPHYLACTIC REACTION DUE TO ADVERSE EFFECT OF CORRECT DRUG OR MEDICAMENT PROPERLY ADMINISTERED, INITIAL ENCOUNTER: ICD-10-CM

## 2019-11-11 DIAGNOSIS — J96.11 CHRONIC RESPIRATORY FAILURE WITH HYPOXIA: ICD-10-CM
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
## 2019-11-11 DIAGNOSIS — I50.32 CHRONIC DIASTOLIC (CONGESTIVE) HEART FAILURE: ICD-10-CM

## 2019-11-11 DIAGNOSIS — I11.0 HYPERTENSIVE HEART DISEASE WITH HEART FAILURE: ICD-10-CM

## 2019-11-11 DIAGNOSIS — Z88.8 ALLERGY STATUS TO OTHER DRUGS, MEDICAMENTS AND BIOLOGICAL SUBSTANCES STATUS: ICD-10-CM

## 2019-11-11 DIAGNOSIS — J44.9 CHRONIC OBSTRUCTIVE PULMONARY DISEASE, UNSPECIFIED: ICD-10-CM

## 2019-11-11 DIAGNOSIS — F31.9 BIPOLAR DISORDER, UNSPECIFIED: ICD-10-CM

## 2019-11-11 DIAGNOSIS — I42.2 OTHER HYPERTROPHIC CARDIOMYOPATHY: ICD-10-CM

## 2019-11-11 DIAGNOSIS — T78.3XXA ANGIONEUROTIC EDEMA, INITIAL ENCOUNTER: ICD-10-CM

## 2019-11-11 DIAGNOSIS — Z86.73 PERSONAL HISTORY OF TRANSIENT ISCHEMIC ATTACK (TIA), AND CEREBRAL INFARCTION WITHOUT RESIDUAL DEFICITS: ICD-10-CM

## 2019-11-28 ENCOUNTER — EMERGENCY (EMERGENCY)
Facility: HOSPITAL | Age: 74
LOS: 0 days | Discharge: HOME | End: 2019-11-29
Attending: EMERGENCY MEDICINE | Admitting: EMERGENCY MEDICINE
Payer: MEDICARE

## 2019-11-28 VITALS
HEART RATE: 73 BPM | HEIGHT: 70 IN | OXYGEN SATURATION: 100 % | TEMPERATURE: 96 F | SYSTOLIC BLOOD PRESSURE: 119 MMHG | DIASTOLIC BLOOD PRESSURE: 55 MMHG | RESPIRATION RATE: 16 BRPM | WEIGHT: 199.96 LBS

## 2019-11-28 DIAGNOSIS — Z90.49 ACQUIRED ABSENCE OF OTHER SPECIFIED PARTS OF DIGESTIVE TRACT: Chronic | ICD-10-CM

## 2019-11-28 DIAGNOSIS — M79.661 PAIN IN RIGHT LOWER LEG: ICD-10-CM

## 2019-11-28 DIAGNOSIS — Z88.5 ALLERGY STATUS TO NARCOTIC AGENT: ICD-10-CM

## 2019-11-28 DIAGNOSIS — Z87.891 PERSONAL HISTORY OF NICOTINE DEPENDENCE: ICD-10-CM

## 2019-11-28 DIAGNOSIS — M79.662 PAIN IN LEFT LOWER LEG: ICD-10-CM

## 2019-11-28 DIAGNOSIS — Z91.041 RADIOGRAPHIC DYE ALLERGY STATUS: ICD-10-CM

## 2019-11-28 DIAGNOSIS — Z88.8 ALLERGY STATUS TO OTHER DRUGS, MEDICAMENTS AND BIOLOGICAL SUBSTANCES: ICD-10-CM

## 2019-11-28 DIAGNOSIS — M79.89 OTHER SPECIFIED SOFT TISSUE DISORDERS: ICD-10-CM

## 2019-11-28 DIAGNOSIS — M79.669 PAIN IN UNSPECIFIED LOWER LEG: ICD-10-CM

## 2019-11-28 LAB
BASOPHILS # BLD AUTO: 0.04 K/UL — SIGNIFICANT CHANGE UP (ref 0–0.2)
BASOPHILS NFR BLD AUTO: 0.9 % — SIGNIFICANT CHANGE UP (ref 0–1)
EOSINOPHIL # BLD AUTO: 0.55 K/UL — SIGNIFICANT CHANGE UP (ref 0–0.7)
EOSINOPHIL NFR BLD AUTO: 11.8 % — HIGH (ref 0–8)
HCT VFR BLD CALC: 35.9 % — LOW (ref 37–47)
HGB BLD-MCNC: 11.4 G/DL — LOW (ref 12–16)
IMM GRANULOCYTES NFR BLD AUTO: 0.2 % — SIGNIFICANT CHANGE UP (ref 0.1–0.3)
LYMPHOCYTES # BLD AUTO: 0.93 K/UL — LOW (ref 1.2–3.4)
LYMPHOCYTES # BLD AUTO: 20 % — LOW (ref 20.5–51.1)
MCHC RBC-ENTMCNC: 31.1 PG — HIGH (ref 27–31)
MCHC RBC-ENTMCNC: 31.8 G/DL — LOW (ref 32–37)
MCV RBC AUTO: 98.1 FL — SIGNIFICANT CHANGE UP (ref 81–99)
MONOCYTES # BLD AUTO: 0.73 K/UL — HIGH (ref 0.1–0.6)
MONOCYTES NFR BLD AUTO: 15.7 % — HIGH (ref 1.7–9.3)
NEUTROPHILS # BLD AUTO: 2.39 K/UL — SIGNIFICANT CHANGE UP (ref 1.4–6.5)
NEUTROPHILS NFR BLD AUTO: 51.4 % — SIGNIFICANT CHANGE UP (ref 42.2–75.2)
NRBC # BLD: 0 /100 WBCS — SIGNIFICANT CHANGE UP (ref 0–0)
PLATELET # BLD AUTO: 221 K/UL — SIGNIFICANT CHANGE UP (ref 130–400)
RBC # BLD: 3.66 M/UL — LOW (ref 4.2–5.4)
RBC # FLD: 14.6 % — HIGH (ref 11.5–14.5)
WBC # BLD: 4.65 K/UL — LOW (ref 4.8–10.8)
WBC # FLD AUTO: 4.65 K/UL — LOW (ref 4.8–10.8)

## 2019-11-28 PROCEDURE — 99284 EMERGENCY DEPT VISIT MOD MDM: CPT

## 2019-11-28 RX ORDER — FUROSEMIDE 40 MG
60 TABLET ORAL ONCE
Refills: 0 | Status: COMPLETED | OUTPATIENT
Start: 2019-11-28 | End: 2019-11-28

## 2019-11-28 RX ADMIN — Medication 60 MILLIGRAM(S): at 23:52

## 2019-11-28 NOTE — ED PROVIDER NOTE - OBJECTIVE STATEMENT
75 yo female hx of HTN/COPD on home o2/CHF/ PVD present c/o b/l lower leg pain x 1 day. pain was off/on but noticed increasing swelling/pain/redness in 1 day. denies injury to leg. reported her feet were swelling few days ago but improved already. denies fever/chill/chest pain/sob/abd pain/n/v/d and urinary sxs.

## 2019-11-28 NOTE — ED PROVIDER NOTE - CARE PROVIDER_API CALL
Michael Huerta)  Surgery; Vascular Surgery  [address] [place] [address]  Phone: [phone]  Fax: [phone]  Follow Up Time:

## 2019-11-28 NOTE — ED PROVIDER NOTE - ATTENDING CONTRIBUTION TO CARE
I personally evaluated the patient, including history and physical exam at bedside, and I discussed the plan of care with the resident or PA directly. I reviewed the Resident’s or Physician Assistant’s note (as assigned above), and agree with the findings and plan except as documented in my note.    74yF HTN cardiomyopathy COPD CHF PVD p/w b/l leg swelling, worsened compared to usual despite compliance with lasix at home (does not know dose). Says tonight her legs felt even more tight than usual and swelling/tightness has extended from just ankles to up near her knees.  Sx are equal bilaterally.  No fevers, CP, SOB, open wounds to legs.  Pt initially says she does not eat salt in her food, then says it is very hard to avoid salt in her diet because it is in everything. She does not know her lasix dose.    CONSTITUTIONAL: well developed; well nourished; well appearing in no acute distress  HEAD: normocephalic; atraumatic  EYES: no conjunctival injection, no scleral icterus  ENT: no nasal discharge; airway clear.  NECK: supple; non tender. + full passive ROM in all directions  CARD: S1, S2 normal; no murmurs, gallops, or rubs. Regular rate and rhythm  RESP: b/l breath sounds, diminished, no wheezing, rhonchi/crackles or inc WOB  ABD: soft; non-distended; non-tender. No rebound, no guarding, no pulsatile abdominal mass  EXT: moving all extremities spontaneously, normal ROM. No clubbing, cyanosis, +b/l LE pitting edema to at least knees, +shiny erythematous skin changes to ankles, +warm and well perfused, no open wounds, +well healed incision at medial L malleolus  SKIN: warm and dry, no lesions noted  NEURO: alert, oriented, CN II-XII grossly intact, motor and sensory grossly intact, speech nonslurred, no focal deficits. GCS 15  PSYCH: calm, cooperative, appropriate, good eye contact, logical thought process, no apparent danger to self or others

## 2019-11-28 NOTE — ED PROVIDER NOTE - NS ED ROS FT
Constitutional: no fever, chills, no recent weight loss, change in appetite or malaise  Eyes: no redness/discharge/pain/vision changes  ENT: no rhinorrhea/ear pain/sore throat  Cardiac: No chest pain, SOB or edema.  Respiratory: No cough or respiratory distress  GI: No nausea, vomiting, diarrhea or abdominal pain.  : No dysuria, frequency, urgency or hematuria  MS: see HPI  Neuro: No headache or weakness. No LOC.  Skin: No skin rash.  Endocrine: No history of thyroid disease or diabetes.

## 2019-11-28 NOTE — ED PROVIDER NOTE - PHYSICAL EXAMINATION
CONSTITUTIONAL: chronic ill, elderly female; in no apparent distress.   EYES: PERRL; EOM intact.   ENT: normal nose; no rhinorrhea; normal pharynx with no tonsillar hypertrophy.   NECK: Supple; non-tender; no cervical lymphadenopathy. No JVD.   CARDIOVASCULAR: Normal S1, S2; no murmurs, rubs, or gallops.   RESPIRATORY: Normal chest excursion with respiration; breath sounds clear and equal bilaterally; no wheezes, rhonchi, or rales.  GI/: Normal bowel sounds; non-distended; non-tender; no palpable organomegaly.   MS: 1+ pitting edema to b/l LE with erythema/warmness to b/l distal LE. mild ttp. no calf swelling and tenderness. faint but palpable DP b/l. temp equal b/l. no wounds noted to b/l LE.   SKIN: see MS exam  NEURO/PSYCH: A & O x 4; grossly unremarkable.

## 2019-11-28 NOTE — ED PROVIDER NOTE - NSFOLLOWUPINSTRUCTIONS_ED_ALL_ED_FT
Leg Pain    WHAT YOU NEED TO KNOW:    Leg pain may be caused by a variety of health conditions. Your tests did not show any broken bones or blood clots.    DISCHARGE INSTRUCTIONS:    Return to the emergency department if:   You have a fever.  Your leg starts to swell.  Your leg pain gets worse.  You have numbness or tingling in your leg or toes.  You cannot put any weight on or move your leg.    Contact your healthcare provider if:  Your pain does not decrease, even after treatment.  You have questions or concerns about your condition or care.    Medicines:     NSAIDs, such as ibuprofen, help decrease swelling, pain, and fever. This medicine is available with or without a doctor's order. NSAIDs can cause stomach bleeding or kidney problems in certain people. If you take blood thinner medicine, always ask your healthcare provider if NSAIDs are safe for you. Always read the medicine label and follow directions.    Take your medicine as directed. Contact your healthcare provider if you think your medicine is not helping or if you have side effects. Tell him of her if you are allergic to any medicine. Keep a list of the medicines, vitamins, and herbs you take. Include the amounts, and when and why you take them. Bring the list or the pill bottles to follow-up visits. Carry your medicine list with you in case of an emergency.    Follow up with your healthcare provider as directed: You may need more tests to find the cause of your leg pain. You may need to see an orthopedic specialist or a physical therapist. Write down your questions so you remember to ask them during your visits.    Manage your leg pain:     Rest your injured leg so that it can heal. You may need an immobilizer, brace, or splint to limit the movement of your leg. You may need to avoid putting any weight on your leg for at least 48 hours. Return to normal activities as directed.    Ice the injury for 20 minutes every 4 hours for up to 24 hours, or as directed. Use an ice pack, or put crushed ice in a plastic bag. Cover it with a towel to protect your skin. Ice helps prevent tissue damage and decreases swelling and pain.    Elevate your injured leg above the level of your heart as often as you can. This will help decrease swelling and pain. If possible, prop your leg on pillows or blankets to keep the area elevated comfortably.     Use assistive devices as directed. You may need to use a cane or crutches. Assistive devices help decrease pain and pressure on your leg when you walk. Ask your healthcare provider for more information about assistive devices and how to use them correctly.    Maintain a healthy weight. Extra body weight can cause pressure and pain in your hip, knee, and ankle joints. Ask your healthcare provider how much you should weigh. Ask him to help you create a weight loss plan if you are overweight.     Leg Edema    WHAT YOU NEED TO KNOW:    Leg edema is swelling caused by fluid buildup. Your legs may swell if you sit or stand for long periods of time, are pregnant, or are injured. Swelling may also occur if you have heart failure or circulation problems. This means that your heart does not pump blood through your body as it should.    DISCHARGE INSTRUCTIONS:    Self-care:     Elevate your legs: Raise your legs above the level of your heart as often as you can. This will help decrease swelling and pain. Prop your legs on pillows or blankets to keep them elevated comfortably.    Wear pressure stockings: These tight stockings put pressure on your legs to promote blood flow and prevent blood clots. Wear the stockings during the day. Do not wear them while you sleep.    Apply heat: Heat helps decrease pain and swelling. Apply heat on the area for 20 to 30 minutes every 2 hours for as many days as directed.     Stay active: Do not stand or sit for long periods of time. Ask your healthcare provider about the best exercise plan for you.    Eat healthy foods: Healthy foods include fruits, vegetables, whole-grain breads, low-fat dairy products, beans, lean meats, and fish. Ask if you need to be on a special diet. Limit salt. Salt will make your body hold even more fluid.    Follow up with your healthcare provider as directed: Write down your questions so you remember to ask them during your visits.     Contact your healthcare provider if:  You have a fever or feel more tired than usual.  The veins in your legs look larger than usual. They may look full or bulging.  Your legs itch or feel heavy.  You have red or white areas or sores on your legs. The skin may also appear dimpled or have indentations.  You are gaining weight.  You have trouble moving your ankles.  The swelling does not go away, or other parts of your body swell.  You have questions or concerns about your condition or care.    Return to the emergency department if:   You cannot walk.  You feel faint or confused.   Your skin turns blue or gray.  Your leg feels warm, tender, and painful. It may be swollen and red.  You have chest pain or trouble breathing that is worse when you lie down.  You suddenly feel lightheaded and have trouble breathing.  You have new and sudden chest pain. You may have more pain when you take deep breaths or cough. You may also cough up blood.

## 2019-11-28 NOTE — ED PROVIDER NOTE - CLINICAL SUMMARY MEDICAL DECISION MAKING FREE TEXT BOX
74yF p/w worsening leg swelling, likely from dietary indiscretion.  Xrays w/o apparent traumatic injury.  US w/o DVT.  Labs reassuring, including normal troponin and only mildly elevated BNP.  Pt w/o CP or SOB.  Suspect pain and erythema related to inc leg swelling w/o concern for cellulitis or wound infection.  Pt given IV dose lasix, recommend inc dose w/ close o/p f/u, return precautions.

## 2019-11-28 NOTE — ED PROVIDER NOTE - PATIENT PORTAL LINK FT
You can access the FollowMyHealth Patient Portal offered by Health system by registering at the following website: [email or web address]. By joining [employer]’s FollowMyHealth portal, you will also be able to view your health information using other applications (apps) compatible with our system.

## 2019-11-29 VITALS
SYSTOLIC BLOOD PRESSURE: 114 MMHG | OXYGEN SATURATION: 100 % | HEART RATE: 71 BPM | RESPIRATION RATE: 18 BRPM | DIASTOLIC BLOOD PRESSURE: 53 MMHG

## 2019-11-29 LAB
ALBUMIN SERPL ELPH-MCNC: 4 G/DL — SIGNIFICANT CHANGE UP (ref 3.5–5.2)
ALP SERPL-CCNC: 130 U/L — HIGH (ref 30–115)
ALT FLD-CCNC: 12 U/L — SIGNIFICANT CHANGE UP (ref 0–41)
ANION GAP SERPL CALC-SCNC: 13 MMOL/L — SIGNIFICANT CHANGE UP (ref 7–14)
AST SERPL-CCNC: 19 U/L — SIGNIFICANT CHANGE UP (ref 0–41)
BILIRUB SERPL-MCNC: 0.2 MG/DL — SIGNIFICANT CHANGE UP (ref 0.2–1.2)
BUN SERPL-MCNC: 31 MG/DL — HIGH (ref 10–20)
CALCIUM SERPL-MCNC: 9.1 MG/DL — SIGNIFICANT CHANGE UP (ref 8.5–10.1)
CHLORIDE SERPL-SCNC: 104 MMOL/L — SIGNIFICANT CHANGE UP (ref 98–110)
CK SERPL-CCNC: 83 U/L — SIGNIFICANT CHANGE UP (ref 0–225)
CO2 SERPL-SCNC: 27 MMOL/L — SIGNIFICANT CHANGE UP (ref 17–32)
CREAT SERPL-MCNC: 1.3 MG/DL — SIGNIFICANT CHANGE UP (ref 0.7–1.5)
GLUCOSE SERPL-MCNC: 125 MG/DL — HIGH (ref 70–99)
LACTATE SERPL-SCNC: 1.8 MMOL/L — SIGNIFICANT CHANGE UP (ref 0.7–2)
NT-PROBNP SERPL-SCNC: 359 PG/ML — HIGH (ref 0–300)
POTASSIUM SERPL-MCNC: 3.9 MMOL/L — SIGNIFICANT CHANGE UP (ref 3.5–5)
POTASSIUM SERPL-SCNC: 3.9 MMOL/L — SIGNIFICANT CHANGE UP (ref 3.5–5)
PROT SERPL-MCNC: 6.5 G/DL — SIGNIFICANT CHANGE UP (ref 6–8)
SODIUM SERPL-SCNC: 144 MMOL/L — SIGNIFICANT CHANGE UP (ref 135–146)
TROPONIN T SERPL-MCNC: <0.01 NG/ML — SIGNIFICANT CHANGE UP

## 2019-11-29 PROCEDURE — 73590 X-RAY EXAM OF LOWER LEG: CPT | Mod: 26,50

## 2019-11-29 PROCEDURE — 93970 EXTREMITY STUDY: CPT | Mod: 26

## 2019-11-29 RX ORDER — FUROSEMIDE 40 MG
3 TABLET ORAL
Qty: 12 | Refills: 0
Start: 2019-11-29 | End: 2019-11-30

## 2019-12-02 ENCOUNTER — INBOUND DOCUMENT (OUTPATIENT)
Age: 74
End: 2019-12-02

## 2019-12-23 ENCOUNTER — APPOINTMENT (OUTPATIENT)
Dept: VASCULAR SURGERY | Facility: CLINIC | Age: 74
End: 2019-12-23
Payer: MEDICARE

## 2019-12-23 VITALS — BODY MASS INDEX: 38.64 KG/M2 | HEIGHT: 62 IN | WEIGHT: 210 LBS

## 2019-12-23 VITALS — SYSTOLIC BLOOD PRESSURE: 126 MMHG | DIASTOLIC BLOOD PRESSURE: 74 MMHG

## 2019-12-23 DIAGNOSIS — M79.89 OTHER SPECIFIED SOFT TISSUE DISORDERS: ICD-10-CM

## 2019-12-23 DIAGNOSIS — I87.2 VENOUS INSUFFICIENCY (CHRONIC) (PERIPHERAL): ICD-10-CM

## 2019-12-23 DIAGNOSIS — Z87.891 PERSONAL HISTORY OF NICOTINE DEPENDENCE: ICD-10-CM

## 2019-12-23 PROCEDURE — 99203 OFFICE O/P NEW LOW 30 MIN: CPT

## 2019-12-23 NOTE — HISTORY OF PRESENT ILLNESS
[FreeTextEntry1] : 75 y/o female with h/o COPD on home oxygen, CHF, sent in for evaluation of bilateral leg swelling,is on diuretic therapy and was given compression stockings that are improving the leg swelling.

## 2019-12-23 NOTE — ASSESSMENT
[FreeTextEntry1] : 73 y/o female with h/o COPD on home oxygen, CHF, sent in for evaluation of bilateral leg swelling,is on diuretic therapy and was given compression stockings that are improving the leg swelling. She also c/o numbness and tingling in the feet.\par She was seen in ED and had venous duplex that showed no evidence of DVT in the lower extremities. She also has neuropathic symptoms in the feet. I recommend use of compression stockings and emollient daily and continue with diuretic therapy.

## 2020-02-08 ENCOUNTER — INPATIENT (INPATIENT)
Facility: HOSPITAL | Age: 75
LOS: 1 days | Discharge: HOME | End: 2020-02-10
Attending: INTERNAL MEDICINE | Admitting: INTERNAL MEDICINE
Payer: MEDICARE

## 2020-02-08 VITALS
DIASTOLIC BLOOD PRESSURE: 66 MMHG | WEIGHT: 205.91 LBS | RESPIRATION RATE: 19 BRPM | SYSTOLIC BLOOD PRESSURE: 139 MMHG | HEART RATE: 69 BPM | HEIGHT: 62 IN | OXYGEN SATURATION: 100 % | TEMPERATURE: 96 F
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
## 2020-02-08 DIAGNOSIS — Z90.49 ACQUIRED ABSENCE OF OTHER SPECIFIED PARTS OF DIGESTIVE TRACT: Chronic | ICD-10-CM

## 2020-02-08 LAB
ALBUMIN SERPL ELPH-MCNC: 3.9 G/DL — SIGNIFICANT CHANGE UP (ref 3.5–5.2)
ALP SERPL-CCNC: 136 U/L — HIGH (ref 30–115)
ALT FLD-CCNC: 27 U/L — SIGNIFICANT CHANGE UP (ref 0–41)
ANION GAP SERPL CALC-SCNC: 12 MMOL/L — SIGNIFICANT CHANGE UP (ref 7–14)
APTT BLD: 33.4 SEC — SIGNIFICANT CHANGE UP (ref 27–39.2)
AST SERPL-CCNC: 56 U/L — HIGH (ref 0–41)
BASOPHILS # BLD AUTO: 0.03 K/UL — SIGNIFICANT CHANGE UP (ref 0–0.2)
BASOPHILS NFR BLD AUTO: 0.6 % — SIGNIFICANT CHANGE UP (ref 0–1)
BILIRUB SERPL-MCNC: <0.2 MG/DL — SIGNIFICANT CHANGE UP (ref 0.2–1.2)
BUN SERPL-MCNC: 29 MG/DL — HIGH (ref 10–20)
CALCIUM SERPL-MCNC: 8.8 MG/DL — SIGNIFICANT CHANGE UP (ref 8.5–10.1)
CHLORIDE SERPL-SCNC: 100 MMOL/L — SIGNIFICANT CHANGE UP (ref 98–110)
CK SERPL-CCNC: 121 U/L — SIGNIFICANT CHANGE UP (ref 0–225)
CO2 SERPL-SCNC: 27 MMOL/L — SIGNIFICANT CHANGE UP (ref 17–32)
CREAT SERPL-MCNC: 1.2 MG/DL — SIGNIFICANT CHANGE UP (ref 0.7–1.5)
EOSINOPHIL # BLD AUTO: 0.2 K/UL — SIGNIFICANT CHANGE UP (ref 0–0.7)
EOSINOPHIL NFR BLD AUTO: 4.3 % — SIGNIFICANT CHANGE UP (ref 0–8)
GLUCOSE SERPL-MCNC: 115 MG/DL — HIGH (ref 70–99)
HCT VFR BLD CALC: 40 % — SIGNIFICANT CHANGE UP (ref 37–47)
HGB BLD-MCNC: 13.1 G/DL — SIGNIFICANT CHANGE UP (ref 12–16)
IMM GRANULOCYTES NFR BLD AUTO: 0.2 % — SIGNIFICANT CHANGE UP (ref 0.1–0.3)
INR BLD: 0.98 RATIO — SIGNIFICANT CHANGE UP (ref 0.65–1.3)
LACTATE SERPL-SCNC: 1.4 MMOL/L — SIGNIFICANT CHANGE UP (ref 0.7–2)
LIDOCAIN IGE QN: 39 U/L — SIGNIFICANT CHANGE UP (ref 7–60)
LYMPHOCYTES # BLD AUTO: 0.82 K/UL — LOW (ref 1.2–3.4)
LYMPHOCYTES # BLD AUTO: 17.5 % — LOW (ref 20.5–51.1)
MCHC RBC-ENTMCNC: 31.2 PG — HIGH (ref 27–31)
MCHC RBC-ENTMCNC: 32.8 G/DL — SIGNIFICANT CHANGE UP (ref 32–37)
MCV RBC AUTO: 95.2 FL — SIGNIFICANT CHANGE UP (ref 81–99)
MONOCYTES # BLD AUTO: 0.5 K/UL — SIGNIFICANT CHANGE UP (ref 0.1–0.6)
MONOCYTES NFR BLD AUTO: 10.7 % — HIGH (ref 1.7–9.3)
NEUTROPHILS # BLD AUTO: 3.13 K/UL — SIGNIFICANT CHANGE UP (ref 1.4–6.5)
NEUTROPHILS NFR BLD AUTO: 66.7 % — SIGNIFICANT CHANGE UP (ref 42.2–75.2)
NRBC # BLD: 0 /100 WBCS — SIGNIFICANT CHANGE UP (ref 0–0)
NT-PROBNP SERPL-SCNC: 295 PG/ML — SIGNIFICANT CHANGE UP (ref 0–300)
PLATELET # BLD AUTO: 184 K/UL — SIGNIFICANT CHANGE UP (ref 130–400)
POTASSIUM SERPL-MCNC: 4.5 MMOL/L — SIGNIFICANT CHANGE UP (ref 3.5–5)
POTASSIUM SERPL-SCNC: 4.5 MMOL/L — SIGNIFICANT CHANGE UP (ref 3.5–5)
PROT SERPL-MCNC: 6.6 G/DL — SIGNIFICANT CHANGE UP (ref 6–8)
PROTHROM AB SERPL-ACNC: 11.3 SEC — SIGNIFICANT CHANGE UP (ref 9.95–12.87)
RBC # BLD: 4.2 M/UL — SIGNIFICANT CHANGE UP (ref 4.2–5.4)
RBC # FLD: 13.4 % — SIGNIFICANT CHANGE UP (ref 11.5–14.5)
SODIUM SERPL-SCNC: 139 MMOL/L — SIGNIFICANT CHANGE UP (ref 135–146)
TROPONIN T SERPL-MCNC: <0.01 NG/ML — SIGNIFICANT CHANGE UP
WBC # BLD: 4.69 K/UL — LOW (ref 4.8–10.8)
WBC # FLD AUTO: 4.69 K/UL — LOW (ref 4.8–10.8)

## 2020-02-08 PROCEDURE — 70450 CT HEAD/BRAIN W/O DYE: CPT | Mod: 26

## 2020-02-08 PROCEDURE — 71250 CT THORAX DX C-: CPT | Mod: 26

## 2020-02-08 PROCEDURE — 99285 EMERGENCY DEPT VISIT HI MDM: CPT

## 2020-02-08 PROCEDURE — 71045 X-RAY EXAM CHEST 1 VIEW: CPT | Mod: 26

## 2020-02-08 PROCEDURE — 99223 1ST HOSP IP/OBS HIGH 75: CPT

## 2020-02-08 PROCEDURE — 72125 CT NECK SPINE W/O DYE: CPT | Mod: 26

## 2020-02-08 PROCEDURE — 72170 X-RAY EXAM OF PELVIS: CPT | Mod: 26

## 2020-02-08 RX ORDER — ACETAMINOPHEN 500 MG
975 TABLET ORAL ONCE
Refills: 0 | Status: COMPLETED | OUTPATIENT
Start: 2020-02-08 | End: 2020-02-08

## 2020-02-08 RX ORDER — TRAMADOL HYDROCHLORIDE 50 MG/1
50 TABLET ORAL ONCE
Refills: 0 | Status: DISCONTINUED | OUTPATIENT
Start: 2020-02-08 | End: 2020-02-08

## 2020-02-08 RX ORDER — IPRATROPIUM/ALBUTEROL SULFATE 18-103MCG
3 AEROSOL WITH ADAPTER (GRAM) INHALATION ONCE
Refills: 0 | Status: COMPLETED | OUTPATIENT
Start: 2020-02-08 | End: 2020-02-08

## 2020-02-08 RX ORDER — METOCLOPRAMIDE HCL 10 MG
10 TABLET ORAL ONCE
Refills: 0 | Status: COMPLETED | OUTPATIENT
Start: 2020-02-08 | End: 2020-02-08

## 2020-02-08 RX ORDER — ASPIRIN/CALCIUM CARB/MAGNESIUM 324 MG
324 TABLET ORAL ONCE
Refills: 0 | Status: COMPLETED | OUTPATIENT
Start: 2020-02-08 | End: 2020-02-08

## 2020-02-08 RX ORDER — ESCITALOPRAM OXALATE 10 MG/1
1 TABLET, FILM COATED ORAL
Qty: 0 | Refills: 0 | DISCHARGE

## 2020-02-08 RX ADMIN — Medication 975 MILLIGRAM(S): at 17:21

## 2020-02-08 RX ADMIN — Medication 975 MILLIGRAM(S): at 17:08

## 2020-02-08 RX ADMIN — Medication 10 MILLIGRAM(S): at 17:08

## 2020-02-08 RX ADMIN — Medication 0.5 MILLIGRAM(S): at 22:34

## 2020-02-08 RX ADMIN — Medication 3 MILLILITER(S): at 20:34

## 2020-02-08 RX ADMIN — TRAMADOL HYDROCHLORIDE 50 MILLIGRAM(S): 50 TABLET ORAL at 22:34

## 2020-02-08 RX ADMIN — Medication 324 MILLIGRAM(S): at 22:34

## 2020-02-08 NOTE — H&P ADULT - NSICDXPASTMEDICALHX_GEN_ALL_CORE_FT
PAST MEDICAL HISTORY:  Allergic reaction     Bipolar 1 disorder     Congestive heart failure     COPD (chronic obstructive pulmonary disease)     Depression     Falls     Hypertension     FLAVIO on CPAP     Other cardiomyopathy     Other emphysema     PVD (peripheral vascular disease)     TIA (transient ischemic attack)     Transient ischemic attack

## 2020-02-08 NOTE — ED PROVIDER NOTE - NS ED ROS FT
Review of Systems         Constitutional: (-) fever (-) chills (-) weakness       Head: (+) trauma       EENT: (-) sore throat (-) congestion       Cardiovascular: (+) chest pain (-) syncope       Respiratory: (-) cough, (-) shortness of breath       Gastrointestinal: (-) abdominal pain (-) vomiting (-) diarrhea (-) nausea       Genitourinary: (-) dysuria (-) frequency (-) hematuria       Musculoskeletal: (-) neck pain (-) back pain (+) joint pain       Integumentary: (-) rash       Neurological: (-) headache (-) altered mental status (-) dizziness (-) paresthesias       Psych: (-) psych history

## 2020-02-08 NOTE — ED PROVIDER NOTE - CARE PLAN
Principal Discharge DX:	Chest pain  Secondary Diagnosis:	Accidental fall  Secondary Diagnosis:	Closed head injury

## 2020-02-08 NOTE — ED PROVIDER NOTE - PMH
Allergic reaction    Bipolar 1 disorder    Congestive heart failure    COPD (chronic obstructive pulmonary disease)    Depression    Falls    Hypertension    FLAVIO on CPAP    Other cardiomyopathy    Other emphysema    PVD (peripheral vascular disease)    TIA (transient ischemic attack)    Transient ischemic attack

## 2020-02-08 NOTE — ED PROVIDER NOTE - ATTENDING CONTRIBUTION TO CARE
Pt presents with multiple complaints. Main complaints are chest burning for the last two days. No shortness of breath. + cough, + fall today while walking and landed on chest and left hip. Could not get up due to "no cartilage in my knees". Denies head injury. On exam S1S2 rrr, lungs clear, abdomen is soft nontender, no bruise to the head, no c-spine tenderness. + left hip tenderness. Neuro intact.

## 2020-02-08 NOTE — H&P ADULT - HISTORY OF PRESENT ILLNESS
[age]yo female with significant PMH presents to the ER due to burning chest pains when she coughs. Denies shortness of breath. Also ER reports patient had a fall

## 2020-02-08 NOTE — H&P ADULT - NSHPLABSRESULTS_GEN_ALL_CORE
13.1   4.69  )-----------( 184      ( 08 Feb 2020 16:32 )             40.0     02-08    139  |  100  |  29<H>  ----------------------------<  115<H>  4.5   |  27  |  1.2    Ca    8.8      08 Feb 2020 16:32    TPro  6.6  /  Alb  3.9  /  TBili  <0.2  /  DBili  x   /  AST  56<H>  /  ALT  27  /  AlkPhos  136<H>  02-08            PT/INR - ( 08 Feb 2020 16:32 )   PT: 11.30 sec;   INR: 0.98 ratio         PTT - ( 08 Feb 2020 16:32 )  PTT:33.4 sec  Lactate Trend  02-08 @ 16:32 Lactate:1.4     CARDIAC MARKERS ( 08 Feb 2020 16:32 )  x     / <0.01 ng/mL / 121 U/L / x     / x          CAPILLARY BLOOD GLUCOSE      POCT Blood Glucose.: 106 mg/dL (08 Feb 2020 16:52)

## 2020-02-08 NOTE — ED PROVIDER NOTE - PROGRESS NOTE DETAILS
Case signed out to Dr. Meade. Evaluation for chest pain and for fall. CT neg for traumatic injury. will admit for chest pain to r/o ACS

## 2020-02-08 NOTE — ED PROVIDER NOTE - PHYSICAL EXAMINATION
Physical Exam    Vital Signs: I have reviewed the initial vital signs  Constitutional: well-nourished, appears stated age, no acute distress  EENT: Small abrasion and ecchymosis in middle of forehead. Conjunctiva pink, Sclera clear, PERRLA, EOMI. Mucous membranes moist, no exudates or lesions noted, uvula midline.  Cardiovascular: S1 and S2 present, regular rate, regular rhythm. Well perfused extremities, no peripheral edema  Respiratory: unlabored respiratory effort, clear to auscultation bilaterally no wheezing, rales or rhonchi  Gastrointestinal: soft, non-tender abdomen. No guarding or rebound tenderness  Musculoskeletal: supple nontender neck, no midline tenderness. TTP in lateral left hip and mid-sternal/left chest wall. no skin changes  Integumentary: warm, dry, no rash  Neurologic: GCS 15. A & O x 3, CN II-XII grossly intact, all extremities’ motor and sensory functions grossly intact  Psychiatric: appropriate mood, appropriate affect

## 2020-02-08 NOTE — ED PROVIDER NOTE - OBJECTIVE STATEMENT
74 year old female hx HTN, HLD, COPD, CHF presenting with fall. patient was walking outside on grass this morngin when she tripped on her foot and fell onto her head/chest. Patient was ambulatory after with + head trauma but no LOC. She denies n n/v/d. She states she began having left hip and mid sternal chest pain, worse with movement, better with rest after the fall. Also admits to headache but no visual changes or weakness. on 81 mg aspirin

## 2020-02-09 DIAGNOSIS — R07.9 CHEST PAIN, UNSPECIFIED: ICD-10-CM

## 2020-02-09 DIAGNOSIS — I50.9 HEART FAILURE, UNSPECIFIED: ICD-10-CM

## 2020-02-09 LAB
ALBUMIN SERPL ELPH-MCNC: 3.8 G/DL — SIGNIFICANT CHANGE UP (ref 3.5–5.2)
ALP SERPL-CCNC: 143 U/L — HIGH (ref 30–115)
ALT FLD-CCNC: 24 U/L — SIGNIFICANT CHANGE UP (ref 0–41)
ANION GAP SERPL CALC-SCNC: 16 MMOL/L — HIGH (ref 7–14)
APPEARANCE UR: CLEAR — SIGNIFICANT CHANGE UP
AST SERPL-CCNC: 29 U/L — SIGNIFICANT CHANGE UP (ref 0–41)
BACTERIA # UR AUTO: ABNORMAL
BILIRUB SERPL-MCNC: <0.2 MG/DL — SIGNIFICANT CHANGE UP (ref 0.2–1.2)
BILIRUB UR-MCNC: NEGATIVE — SIGNIFICANT CHANGE UP
BUN SERPL-MCNC: 22 MG/DL — HIGH (ref 10–20)
CALCIUM SERPL-MCNC: 9 MG/DL — SIGNIFICANT CHANGE UP (ref 8.5–10.1)
CHLORIDE SERPL-SCNC: 105 MMOL/L — SIGNIFICANT CHANGE UP (ref 98–110)
CK MB CFR SERPL CALC: 1.7 NG/ML — SIGNIFICANT CHANGE UP (ref 0.6–6.3)
CK MB CFR SERPL CALC: 2.1 NG/ML — SIGNIFICANT CHANGE UP (ref 0.6–6.3)
CK SERPL-CCNC: 46 U/L — SIGNIFICANT CHANGE UP (ref 0–225)
CK SERPL-CCNC: 49 U/L — SIGNIFICANT CHANGE UP (ref 0–225)
CO2 SERPL-SCNC: 24 MMOL/L — SIGNIFICANT CHANGE UP (ref 17–32)
COLOR SPEC: YELLOW — SIGNIFICANT CHANGE UP
CREAT SERPL-MCNC: 1 MG/DL — SIGNIFICANT CHANGE UP (ref 0.7–1.5)
DIFF PNL FLD: ABNORMAL
EPI CELLS # UR: ABNORMAL /HPF
GLUCOSE SERPL-MCNC: 92 MG/DL — SIGNIFICANT CHANGE UP (ref 70–99)
GLUCOSE UR QL: NEGATIVE MG/DL — SIGNIFICANT CHANGE UP
HCT VFR BLD CALC: 38.4 % — SIGNIFICANT CHANGE UP (ref 37–47)
HGB BLD-MCNC: 12.6 G/DL — SIGNIFICANT CHANGE UP (ref 12–16)
KETONES UR-MCNC: NEGATIVE — SIGNIFICANT CHANGE UP
LEUKOCYTE ESTERASE UR-ACNC: ABNORMAL
MCHC RBC-ENTMCNC: 31.3 PG — HIGH (ref 27–31)
MCHC RBC-ENTMCNC: 32.8 G/DL — SIGNIFICANT CHANGE UP (ref 32–37)
MCV RBC AUTO: 95.3 FL — SIGNIFICANT CHANGE UP (ref 81–99)
NITRITE UR-MCNC: POSITIVE
NRBC # BLD: 0 /100 WBCS — SIGNIFICANT CHANGE UP (ref 0–0)
PH UR: 6.5 — SIGNIFICANT CHANGE UP (ref 5–8)
PLATELET # BLD AUTO: 177 K/UL — SIGNIFICANT CHANGE UP (ref 130–400)
POTASSIUM SERPL-MCNC: 3.8 MMOL/L — SIGNIFICANT CHANGE UP (ref 3.5–5)
POTASSIUM SERPL-SCNC: 3.8 MMOL/L — SIGNIFICANT CHANGE UP (ref 3.5–5)
PROT SERPL-MCNC: 6.2 G/DL — SIGNIFICANT CHANGE UP (ref 6–8)
PROT UR-MCNC: NEGATIVE MG/DL — SIGNIFICANT CHANGE UP
RBC # BLD: 4.03 M/UL — LOW (ref 4.2–5.4)
RBC # FLD: 13.6 % — SIGNIFICANT CHANGE UP (ref 11.5–14.5)
RBC CASTS # UR COMP ASSIST: SIGNIFICANT CHANGE UP /HPF
SODIUM SERPL-SCNC: 145 MMOL/L — SIGNIFICANT CHANGE UP (ref 135–146)
SP GR SPEC: 1.01 — SIGNIFICANT CHANGE UP (ref 1.01–1.03)
TROPONIN T SERPL-MCNC: <0.01 NG/ML — SIGNIFICANT CHANGE UP
TROPONIN T SERPL-MCNC: <0.01 NG/ML — SIGNIFICANT CHANGE UP
UROBILINOGEN FLD QL: 0.2 MG/DL — SIGNIFICANT CHANGE UP (ref 0.2–0.2)
WBC # BLD: 3.89 K/UL — LOW (ref 4.8–10.8)
WBC # FLD AUTO: 3.89 K/UL — LOW (ref 4.8–10.8)
WBC UR QL: SIGNIFICANT CHANGE UP /HPF

## 2020-02-09 PROCEDURE — 73562 X-RAY EXAM OF KNEE 3: CPT | Mod: 26,LT

## 2020-02-09 PROCEDURE — 99233 SBSQ HOSP IP/OBS HIGH 50: CPT

## 2020-02-09 RX ORDER — TRAMADOL HYDROCHLORIDE 50 MG/1
50 TABLET ORAL EVERY 6 HOURS
Refills: 0 | Status: DISCONTINUED | OUTPATIENT
Start: 2020-02-09 | End: 2020-02-10

## 2020-02-09 RX ORDER — QUETIAPINE FUMARATE 200 MG/1
100 TABLET, FILM COATED ORAL AT BEDTIME
Refills: 0 | Status: DISCONTINUED | OUTPATIENT
Start: 2020-02-09 | End: 2020-02-10

## 2020-02-09 RX ORDER — ATORVASTATIN CALCIUM 80 MG/1
20 TABLET, FILM COATED ORAL AT BEDTIME
Refills: 0 | Status: DISCONTINUED | OUTPATIENT
Start: 2020-02-09 | End: 2020-02-10

## 2020-02-09 RX ORDER — FUROSEMIDE 40 MG
60 TABLET ORAL
Refills: 0 | Status: DISCONTINUED | OUTPATIENT
Start: 2020-02-09 | End: 2020-02-10

## 2020-02-09 RX ORDER — HEPARIN SODIUM 5000 [USP'U]/ML
5000 INJECTION INTRAVENOUS; SUBCUTANEOUS
Refills: 0 | Status: DISCONTINUED | OUTPATIENT
Start: 2020-02-09 | End: 2020-02-10

## 2020-02-09 RX ORDER — GABAPENTIN 400 MG/1
300 CAPSULE ORAL THREE TIMES A DAY
Refills: 0 | Status: DISCONTINUED | OUTPATIENT
Start: 2020-02-09 | End: 2020-02-10

## 2020-02-09 RX ORDER — FAMOTIDINE 10 MG/ML
40 INJECTION INTRAVENOUS AT BEDTIME
Refills: 0 | Status: DISCONTINUED | OUTPATIENT
Start: 2020-02-09 | End: 2020-02-09

## 2020-02-09 RX ORDER — FAMOTIDINE 10 MG/ML
20 INJECTION INTRAVENOUS AT BEDTIME
Refills: 0 | Status: DISCONTINUED | OUTPATIENT
Start: 2020-02-09 | End: 2020-02-10

## 2020-02-09 RX ORDER — METOPROLOL TARTRATE 50 MG
100 TABLET ORAL
Refills: 0 | Status: DISCONTINUED | OUTPATIENT
Start: 2020-02-09 | End: 2020-02-10

## 2020-02-09 RX ORDER — ASPIRIN/CALCIUM CARB/MAGNESIUM 324 MG
81 TABLET ORAL DAILY
Refills: 0 | Status: DISCONTINUED | OUTPATIENT
Start: 2020-02-09 | End: 2020-02-10

## 2020-02-09 RX ORDER — MONTELUKAST 4 MG/1
10 TABLET, CHEWABLE ORAL DAILY
Refills: 0 | Status: DISCONTINUED | OUTPATIENT
Start: 2020-02-09 | End: 2020-02-10

## 2020-02-09 RX ORDER — BUDESONIDE AND FORMOTEROL FUMARATE DIHYDRATE 160; 4.5 UG/1; UG/1
2 AEROSOL RESPIRATORY (INHALATION)
Refills: 0 | Status: DISCONTINUED | OUTPATIENT
Start: 2020-02-09 | End: 2020-02-10

## 2020-02-09 RX ORDER — AMLODIPINE BESYLATE 2.5 MG/1
2.5 TABLET ORAL DAILY
Refills: 0 | Status: DISCONTINUED | OUTPATIENT
Start: 2020-02-09 | End: 2020-02-10

## 2020-02-09 RX ORDER — LAMOTRIGINE 25 MG/1
25 TABLET, ORALLY DISINTEGRATING ORAL
Refills: 0 | Status: DISCONTINUED | OUTPATIENT
Start: 2020-02-09 | End: 2020-02-10

## 2020-02-09 RX ADMIN — TRAMADOL HYDROCHLORIDE 50 MILLIGRAM(S): 50 TABLET ORAL at 10:01

## 2020-02-09 RX ADMIN — Medication 60 MILLIGRAM(S): at 06:55

## 2020-02-09 RX ADMIN — MONTELUKAST 10 MILLIGRAM(S): 4 TABLET, CHEWABLE ORAL at 12:12

## 2020-02-09 RX ADMIN — Medication 100 MILLIGRAM(S): at 10:01

## 2020-02-09 RX ADMIN — TRAMADOL HYDROCHLORIDE 50 MILLIGRAM(S): 50 TABLET ORAL at 18:03

## 2020-02-09 RX ADMIN — LAMOTRIGINE 25 MILLIGRAM(S): 25 TABLET, ORALLY DISINTEGRATING ORAL at 06:56

## 2020-02-09 RX ADMIN — Medication 100 MILLIGRAM(S): at 18:01

## 2020-02-09 RX ADMIN — GABAPENTIN 300 MILLIGRAM(S): 400 CAPSULE ORAL at 21:22

## 2020-02-09 RX ADMIN — GABAPENTIN 300 MILLIGRAM(S): 400 CAPSULE ORAL at 06:56

## 2020-02-09 RX ADMIN — ATORVASTATIN CALCIUM 20 MILLIGRAM(S): 80 TABLET, FILM COATED ORAL at 21:22

## 2020-02-09 RX ADMIN — Medication 81 MILLIGRAM(S): at 12:14

## 2020-02-09 RX ADMIN — AMLODIPINE BESYLATE 2.5 MILLIGRAM(S): 2.5 TABLET ORAL at 06:56

## 2020-02-09 RX ADMIN — BUDESONIDE AND FORMOTEROL FUMARATE DIHYDRATE 2 PUFF(S): 160; 4.5 AEROSOL RESPIRATORY (INHALATION) at 19:51

## 2020-02-09 RX ADMIN — HEPARIN SODIUM 5000 UNIT(S): 5000 INJECTION INTRAVENOUS; SUBCUTANEOUS at 06:57

## 2020-02-09 RX ADMIN — HEPARIN SODIUM 5000 UNIT(S): 5000 INJECTION INTRAVENOUS; SUBCUTANEOUS at 18:01

## 2020-02-09 RX ADMIN — Medication 0.5 MILLIGRAM(S): at 10:01

## 2020-02-09 RX ADMIN — TRAMADOL HYDROCHLORIDE 50 MILLIGRAM(S): 50 TABLET ORAL at 18:14

## 2020-02-09 RX ADMIN — QUETIAPINE FUMARATE 100 MILLIGRAM(S): 200 TABLET, FILM COATED ORAL at 21:22

## 2020-02-09 RX ADMIN — TRAMADOL HYDROCHLORIDE 50 MILLIGRAM(S): 50 TABLET ORAL at 12:14

## 2020-02-09 RX ADMIN — Medication 60 MILLIGRAM(S): at 18:01

## 2020-02-09 RX ADMIN — FAMOTIDINE 20 MILLIGRAM(S): 10 INJECTION INTRAVENOUS at 21:22

## 2020-02-09 RX ADMIN — LAMOTRIGINE 25 MILLIGRAM(S): 25 TABLET, ORALLY DISINTEGRATING ORAL at 18:01

## 2020-02-09 RX ADMIN — Medication 0.5 MILLIGRAM(S): at 21:26

## 2020-02-09 RX ADMIN — Medication 30 MILLILITER(S): at 16:12

## 2020-02-09 NOTE — PROGRESS NOTE ADULT - SUBJECTIVE AND OBJECTIVE BOX
SHAUN COATES    Patient is a 74y old  Female who presents with a chief complaint of   HPI:  75yo female with significant PMH presents to the ER due to burning chest pains when she coughs. Denies shortness of breath. Also ER reports patient had a fall (08 Feb 2020 21:46)    INTERVAL HPI/OVERNIGHT EVENTS: no events, pt c/o neck tightness, denies chest pain or SOB.    ROS: All ROS negative except as documented above    PHYSICAL EXAM:  T(C): 37.3, Max: 37.3 (02-09-20 @ 14:42)  HR: 79 (79 - 112)  BP: 132/64 (111/53 - 160/77)  RR: 18 (18 - 20)  SpO2: 96% (95% - 100%)    GENERAL: NAD, speaks in full sentences   NECK: Supple, No JVD, no stridor  PULMONARY/CHEST: No rales, rhonchi, wheezing  CARDIOVASC: Regular rate and rhythm; No murmurs  GI/ABDOMEN: Soft, Nontender, Nondistended; Bowel sounds present  EXTREMITIES:  2+ Peripheral Pulses, No clubbing, cyanosis, or edema, no deformity. No calf tenderness b/l.  NERVOUS SYSTEM:  Alert & Oriented X3, no focal deficit     LABS:                        12.6   3.89  )-----------( 177      ( 09 Feb 2020 06:33 )             38.4     02-09    145  |  105  |  22<H>  ----------------------------<  92  3.8   |  24  |  1.0    Ca    9.0      09 Feb 2020 06:33    TPro  6.2  /  Alb  3.8  /  TBili  <0.2  /  DBili  x   /  AST  29  /  ALT  24  /  AlkPhos  143<H>  02-09    PT/INR - ( 08 Feb 2020 16:32 )   PT: 11.30 sec;   INR: 0.98 ratio         PTT - ( 08 Feb 2020 16:32 )  PTT:33.4 sec    Troponin T, Serum: <0.01 ng/mL (02-09-20 @ 15:26)  Troponin T, Serum: <0.01 ng/mL (02-09-20 @ 06:33)  Troponin T, Serum: <0.01 ng/mL (02-08-20 @ 16:32)    Serum Pro-Brain Natriuretic Peptide: 295 pg/mL (02.08.20 @ 16:32)    < from: Xray Knee 3 Views, Left (02.09.20 @ 13:06) >  Impression:    No acute fracture or acute articular abnormality.    Degenerative changes.    < end of copied text >    < from: CT Chest No Cont (02.08.20 @ 21:20) >  IMPRESSION:    No displaced fracture.    Redemonstrated smoking-related lung injury.    Evidence of coronary arterial disease.    < end of copied text >      MEDICATIONS  (STANDING):  amLODIPine   Tablet 2.5 milliGRAM(s) Oral daily  aspirin  chewable 81 milliGRAM(s) Oral daily  atorvastatin 20 milliGRAM(s) Oral at bedtime  budesonide 160 MICROgram(s)/formoterol 4.5 MICROgram(s) Inhaler 2 Puff(s) Inhalation two times a day  famotidine    Tablet 20 milliGRAM(s) Oral at bedtime  furosemide    Tablet 60 milliGRAM(s) Oral two times a day  gabapentin 300 milliGRAM(s) Oral three times a day  heparin  Injectable 5000 Unit(s) SubCutaneous two times a day  lamoTRIgine 25 milliGRAM(s) Oral two times a day  metoprolol tartrate 100 milliGRAM(s) Oral two times a day  montelukast 10 milliGRAM(s) Oral daily  QUEtiapine 100 milliGRAM(s) Oral at bedtime    MEDICATIONS  (PRN):  aluminum hydroxide/magnesium hydroxide/simethicone Suspension 30 milliLiter(s) Oral every 6 hours PRN Dyspepsia  LORazepam     Tablet 0.5 milliGRAM(s) Oral two times a day PRN Anxiety  traMADol 50 milliGRAM(s) Oral every 6 hours PRN Severe Pain (7 - 10)

## 2020-02-09 NOTE — PROGRESS NOTE ADULT - ASSESSMENT
73yo female with significant PMH presents to the ER due to burning chest pains when she coughs, that later transformed to neck tightness.     # Chest pain - ACS ruled out, likely anxiety.  No signs of angioedema.  EKG with no acute ischemic changes    # Mechanical fall and Left knee pain.   - XR no fracture, +DJD.   - PT eval    # CAD/Chronic diastolic CHF - no signs of exacerbatiuon, cont home meds    # chronic respiratory failure with COPD - no signs of exacerbation, cont home meds and O2 supplement    # Hx of Depression and Anxiety  - Cont with Escitalopram, Ativan, and Seroquel     # FLAVIO - cont BiPAP - pt does not know settings    Anticipated dc tomorrow.   Can dc telemetry.

## 2020-02-09 NOTE — PATIENT PROFILE ADULT - TRANSPORTATION
Telephone Encounter by Brynn Salazar at 07/03/17 04:22 PM     Author:  Brynn Salazar Service:  (none) Author Type:  Patient      Filed:  07/03/17 04:23 PM Encounter Date:  7/3/2017 Status:  Signed     :  Brynn Salazar (Patient )            Epic order dated 7.1.2017 for podiatry for[EJ1.1M] Contusion of right foot, initial encounter Heel spur, right[EJ1.1C]           Time frame[EJ1.1M] ASAP[EJ1.1C]              Called patient left message on voice mail to contact us and schedule appointment with our podiatry group        1st attempt[EJ1.1M]       Revision History        User Key Date/Time User Provider Type Action    > EJ1.1 07/03/17 04:23 PM Brynn Salazar Patient  Sign    C - Copied, M - Manual            
Telephone Encounter by Brynn Salazar at 07/14/17 09:41 AM     Author:  Brynn Salazar Service:  (none) Author Type:  Patient      Filed:  07/14/17 09:42 AM Encounter Date:  7/3/2017 Status:  Signed     :  Brynn Salazar (Patient )            Called and left message to call and schedule appointment. 3rd attempt close encounter letter mailed[EJ1.1M]       Revision History        User Key Date/Time User Provider Type Action    > EJ1.1 07/14/17 09:42 AM Brynn Salazar Patient  Sign    M - Manual            
Telephone Encounter by Ludmila Lester at 07/12/17 09:32 AM     Author:  Ludmila Lester Service:  (none) Author Type:       Filed:  07/12/17 09:33 AM Encounter Date:  7/3/2017 Status:  Signed     :  Lumdila Lester ()            Called  to schedule in podiatry department, phone rang and them was disconnected got busy tone.   2nd attempt[CC1.1M]      Revision History        User Key Date/Time User Provider Type Action    > CC1.1 07/12/17 09:33 AM Ludmila Lester  Sign    M - Manual            
no

## 2020-02-10 ENCOUNTER — TRANSCRIPTION ENCOUNTER (OUTPATIENT)
Age: 75
End: 2020-02-10

## 2020-02-10 VITALS
HEART RATE: 84 BPM | DIASTOLIC BLOOD PRESSURE: 61 MMHG | TEMPERATURE: 98 F | RESPIRATION RATE: 18 BRPM | SYSTOLIC BLOOD PRESSURE: 135 MMHG

## 2020-02-10 PROCEDURE — 99239 HOSP IP/OBS DSCHRG MGMT >30: CPT

## 2020-02-10 RX ORDER — ACETAMINOPHEN AND CHLORPHENIRAMINE MALEATE 325; 2 MG/1; MG/1
0 TABLET ORAL
Qty: 0 | Refills: 0 | DISCHARGE

## 2020-02-10 RX ORDER — CEFPODOXIME PROXETIL 100 MG
1 TABLET ORAL
Qty: 5 | Refills: 0
Start: 2020-02-10 | End: 2020-02-14

## 2020-02-10 RX ORDER — BUDESONIDE AND FORMOTEROL FUMARATE DIHYDRATE 160; 4.5 UG/1; UG/1
0 AEROSOL RESPIRATORY (INHALATION)
Qty: 0 | Refills: 0 | DISCHARGE

## 2020-02-10 RX ORDER — FLUTICASONE PROPIONATE 220 MCG
1 AEROSOL WITH ADAPTER (GRAM) INHALATION
Qty: 0 | Refills: 0 | DISCHARGE

## 2020-02-10 RX ADMIN — LAMOTRIGINE 25 MILLIGRAM(S): 25 TABLET, ORALLY DISINTEGRATING ORAL at 06:09

## 2020-02-10 RX ADMIN — TRAMADOL HYDROCHLORIDE 50 MILLIGRAM(S): 50 TABLET ORAL at 09:17

## 2020-02-10 RX ADMIN — MONTELUKAST 10 MILLIGRAM(S): 4 TABLET, CHEWABLE ORAL at 12:36

## 2020-02-10 RX ADMIN — Medication 60 MILLIGRAM(S): at 06:09

## 2020-02-10 RX ADMIN — Medication 0.5 MILLIGRAM(S): at 14:20

## 2020-02-10 RX ADMIN — Medication 100 MILLIGRAM(S): at 06:09

## 2020-02-10 RX ADMIN — BUDESONIDE AND FORMOTEROL FUMARATE DIHYDRATE 2 PUFF(S): 160; 4.5 AEROSOL RESPIRATORY (INHALATION) at 09:17

## 2020-02-10 RX ADMIN — GABAPENTIN 300 MILLIGRAM(S): 400 CAPSULE ORAL at 06:09

## 2020-02-10 RX ADMIN — AMLODIPINE BESYLATE 2.5 MILLIGRAM(S): 2.5 TABLET ORAL at 06:08

## 2020-02-10 RX ADMIN — Medication 81 MILLIGRAM(S): at 12:36

## 2020-02-10 RX ADMIN — GABAPENTIN 300 MILLIGRAM(S): 400 CAPSULE ORAL at 14:14

## 2020-02-10 RX ADMIN — HEPARIN SODIUM 5000 UNIT(S): 5000 INJECTION INTRAVENOUS; SUBCUTANEOUS at 06:10

## 2020-02-10 RX ADMIN — TRAMADOL HYDROCHLORIDE 50 MILLIGRAM(S): 50 TABLET ORAL at 09:45

## 2020-02-10 NOTE — DISCHARGE NOTE NURSING/CASE MANAGEMENT/SOCIAL WORK - PATIENT PORTAL LINK FT
You can access the FollowMyHealth Patient Portal offered by [facility] by registering at the following website: [email or web address]. By joining [email or web address]’s FollowMyHealth portal, you will also be able to view your health information using other applications (apps) compatible with our system.

## 2020-02-10 NOTE — DISCHARGE NOTE PROVIDER - HOSPITAL COURSE
to be completed by attending 75yo female with significant PMH presents to the ER due to burning chest pains when she coughs, that later transformed to neck tightness.         # Chest pain - ACS ruled out, likely anxiety.    No signs of angioedema.    EKG with no acute ischemic changes    CE negative x 3    ECHO prelim no changes from prior        # Uncomplicated UTI - pt is symptomatic and UA (+), UCx pending. Pt has Hx of E. Coli UTI that was Bactrim and quinolones resistant    - will give Vantin for 5 days        # Mechanical fall and Left knee pain.     - XR no fracture, +DJD.     - PT eval appreciated        # CAD/Chronic diastolic CHF - no signs of exacerbation cont home meds        # chronic respiratory failure with COPD - no signs of exacerbation, cont home meds and O2 supplement    - will give Robitussin for cough        # Hx of Depression and Anxiety    - Cont with Escitalopram, Ativan, and Seroquel         # FLAVIO - cont BiPAP - pt does not know settings        Pt is clinically stable for discharge home today.

## 2020-02-10 NOTE — PHYSICAL THERAPY INITIAL EVALUATION ADULT - GENERAL OBSERVATIONS, REHAB EVAL
13:00-13:24 Chart reviewed. Pt encountered semireclined in bed,  may be seen by Physical Therapist as confirmed with Nurse. Patient denied pain at rest and ready to get up now; +O2 via NC; +primafit

## 2020-02-10 NOTE — DISCHARGE NOTE PROVIDER - NSDCCPCAREPLAN_GEN_ALL_CORE_FT
PRINCIPAL DISCHARGE DIAGNOSIS  Diagnosis: Chest pain  Assessment and Plan of Treatment: Resolved      SECONDARY DISCHARGE DIAGNOSES  Diagnosis: Accidental fall  Assessment and Plan of Treatment: Resolved PRINCIPAL DISCHARGE DIAGNOSIS  Diagnosis: Chest pain  Assessment and Plan of Treatment: Resolved      SECONDARY DISCHARGE DIAGNOSES  Diagnosis: Acute UTI  Assessment and Plan of Treatment: continue antibiotics for 5 days    Diagnosis: Accidental fall  Assessment and Plan of Treatment: no fractures

## 2020-02-10 NOTE — PHYSICAL THERAPY INITIAL EVALUATION ADULT - IMPAIRED TRANSFERS: SIT/STAND, REHAB EVAL
impaired balance/narrow base of support/impaired postural control/decreased strength/decreased endurance

## 2020-02-10 NOTE — PROGRESS NOTE ADULT - SUBJECTIVE AND OBJECTIVE BOX
SHAUN COATES    Patient is a 74y old  Female who presents with a chief complaint of Chest Pain (10 Feb 2020 12:41)    HPI:  73yo female with significant PMH presents to the ER due to burning chest pains when she coughs. Denies shortness of breath. Also ER reports patient had a fall (2020 21:46)     HPI/OVERNIGHT EVENTS: no events overnight, no events on telemetry, pt c/o cough, denies chest pain, also c/o dysuria.    CONSTITUTIONAL: No weakness, fevers or chills  EYES/ENT: No visual changes;  No vertigo or throat pain   NECK: No pain or stiffness  RESPIRATORY: + cough, No wheezing, hemoptysis; No shortness of breath  CARDIOVASCULAR: No chest pain or palpitations  GASTROINTESTINAL: No abdominal or epigastric pain. No nausea, vomiting, or hematemesis; No diarrhea or constipation. No melena or hematochezia.  GENITOURINARY: No dysuria, frequency or hematuria  NEUROLOGICAL: No numbness or weakness  SKIN: No itching, rashes     PHYSICAL EXAM:  T(C): 36.8, Max: 37.7 (20 @ 22:10)  HR: 84 (79 - 84)  BP: 135/61 (115/62 - 141/65)  RR: 18 (16 - 20)  SpO2: 98% (97% - 98%)    GENERAL: NAD  NECK: Supple, No JVD  PULMONARY/CHEST: No rales, rhonchi, wheezing  CARDIOVASC: Regular rate and rhythm; No murmurs  GI/ABDOMEN: Soft, Nontender, Nondistended; Bowel sounds present  EXTREMITIES: chronic edema. No calf tenderness b/l.  SKIN: No rashes or lesions  NERVOUS SYSTEM:  Alert & Oriented X3, no focal deficit     LABS:                        12.6   3.89  )-----------( 177      ( 2020 06:33 )             38.4         145  |  105  |  22<H>  ----------------------------<  92  3.8   |  24  |  1.0    Ca    9.0      2020 06:33    TPro  6.2  /  Alb  3.8  /  TBili  <0.2  /  DBili  x   /  AST  29  /  ALT  24  /  AlkPhos  143<H>      PT/INR - ( 2020 16:32 )   PT: 11.30 sec;   INR: 0.98 ratio         PTT - ( 2020 16:32 )  PTT:33.4 sec  Urinalysis Basic - ( 2020 16:30 )    Color: Yellow / Appearance: Clear / S.015 / pH: x  Gluc: x / Ketone: Negative  / Bili: Negative / Urobili: 0.2 mg/dL   Blood: x / Protein: Negative mg/dL / Nitrite: Positive   Leuk Esterase: Trace / RBC: 1-2 /HPF / WBC 3-5 /HPF   Sq Epi: x / Non Sq Epi: Few /HPF / Bacteria: Many    Troponin T, Serum: <0.01 ng/mL (20 @ 15:26)  Troponin T, Serum: <0.01 ng/mL (20 @ 06:33)  Troponin T, Serum: <0.01 ng/mL (20 @ 16:32)      MEDICATIONS  (STANDING):  amLODIPine   Tablet 2.5 milliGRAM(s) Oral daily  aspirin  chewable 81 milliGRAM(s) Oral daily  atorvastatin 20 milliGRAM(s) Oral at bedtime  budesonide 160 MICROgram(s)/formoterol 4.5 MICROgram(s) Inhaler 2 Puff(s) Inhalation two times a day  famotidine    Tablet 20 milliGRAM(s) Oral at bedtime  furosemide    Tablet 60 milliGRAM(s) Oral two times a day  gabapentin 300 milliGRAM(s) Oral three times a day  heparin  Injectable 5000 Unit(s) SubCutaneous two times a day  lamoTRIgine 25 milliGRAM(s) Oral two times a day  metoprolol tartrate 100 milliGRAM(s) Oral two times a day  montelukast 10 milliGRAM(s) Oral daily  QUEtiapine 100 milliGRAM(s) Oral at bedtime    MEDICATIONS  (PRN):  aluminum hydroxide/magnesium hydroxide/simethicone Suspension 30 milliLiter(s) Oral every 6 hours PRN Dyspepsia  LORazepam     Tablet 0.5 milliGRAM(s) Oral two times a day PRN Anxiety  traMADol 50 milliGRAM(s) Oral every 6 hours PRN Severe Pain (7 - 10)

## 2020-02-10 NOTE — DISCHARGE NOTE PROVIDER - NSDCMRMEDTOKEN_GEN_ALL_CORE_FT
amLODIPine 2.5 mg oral tablet: 1 tab(s) orally once a day  aspirin 81 mg oral tablet: 1 tab(s) orally once a day  atorvastatin 20 mg oral tablet: 1 tab(s) orally once a day  budesonide-formoterol 160 mcg-4.5 mcg/inh inhalation aerosol: 2 puff(s) inhaled 2 times a day  Coricidin:   fluticasone 50 mcg/inh inhalation powder: 1 puff(s) inhaled 2 times a day  furosemide 20 mg oral tablet: 3 tab(s) orally 2 times a day  gabapentin 300 mg oral capsule: 1 cap(s) orally 3 times a day  lamoTRIgine 25 mg oral tablet, chewable dispersible: 1 tab(s) orally 2 times a day  Metoprolol Tartrate 100 mg oral tablet: 1 tab(s) orally 2 times a day  montelukast 10 mg oral tablet: 1 tab(s) orally once a day  Pepcid 40 mg oral tablet: 1 tab(s) orally once a day (at bedtime)  QUEtiapine 100 mg oral tablet:   Symbicort:   traMADol 50 mg oral tablet: 1 tab(s) orally every 6 hours, As needed, Severe Pain (7 - 10) MDD:4 amLODIPine 2.5 mg oral tablet: 1 tab(s) orally once a day  aspirin 81 mg oral tablet: 1 tab(s) orally once a day  atorvastatin 20 mg oral tablet: 1 tab(s) orally once a day  budesonide-formoterol 160 mcg-4.5 mcg/inh inhalation aerosol: 2 puff(s) inhaled 2 times a day  furosemide 20 mg oral tablet: 3 tab(s) orally 2 times a day  gabapentin 300 mg oral capsule: 1 cap(s) orally 3 times a day  guaiFENesin 100 mg/5 mL oral liquid: 5 milliliter(s) orally every 6 hours, As needed, Cough  lamoTRIgine 25 mg oral tablet, chewable dispersible: 1 tab(s) orally 2 times a day  Metoprolol Tartrate 100 mg oral tablet: 1 tab(s) orally 2 times a day  montelukast 10 mg oral tablet: 1 tab(s) orally once a day  Pepcid 40 mg oral tablet: 1 tab(s) orally once a day (at bedtime)  QUEtiapine 100 mg oral tablet:   traMADol 50 mg oral tablet: 1 tab(s) orally every 6 hours, As needed, Severe Pain (7 - 10) MDD:4

## 2020-02-10 NOTE — PHYSICAL THERAPY INITIAL EVALUATION ADULT - IMPAIRMENTS CONTRIBUTING TO GAIT DEVIATIONS, PT EVAL
impaired postural control/decreased endurance/decreased strength/impaired balance/narrow base of support

## 2020-02-10 NOTE — PROGRESS NOTE ADULT - ASSESSMENT
75yo female with significant PMH presents to the ER due to burning chest pains when she coughs, that later transformed to neck tightness.     # Chest pain - ACS ruled out, likely anxiety.  No signs of angioedema.  EKG with no acute ischemic changes  CE negative x 3  ECHO prelim no changes from prior    # Uncomplicated UTI - pt is symptomatic and UA (+), UCx pending. Pt has Hx of E. Coli UTI that was Bactrim and quinolones resistant  - will give Vantin for 5 days    # Mechanical fall and Left knee pain.   - XR no fracture, +DJD.   - PT eval appreciated    # CAD/Chronic diastolic CHF - no signs of exacerbation cont home meds    # chronic respiratory failure with COPD - no signs of exacerbation, cont home meds and O2 supplement  - will give Robitussin for cough    # Hx of Depression and Anxiety  - Cont with Escitalopram, Ativan, and Seroquel     # FLAVIO - cont BiPAP - pt does not know settings    Pt is clinically stable for discharge home today.

## 2020-02-10 NOTE — DISCHARGE NOTE PROVIDER - CARE PROVIDER_API CALL
Milad Aguilar)  Family Medicine  [address] [place] [address]  Phone: [phone]  Fax: [phone]  Follow Up Time: 1 week

## 2020-02-11 NOTE — PATIENT PROFILE ADULT - SPECIFY WHICH ONES ARE ON CHART
dessert (such as Jell-O) may also soothe the throat. · Get lots of rest.  · Do not smoke, and avoid secondhand smoke. If you need help quitting, talk to your doctor about stop-smoking programs and medicines. These can increase your chances of quitting for good. · Use a vaporizer or humidifier to add moisture to the air in your bedroom. Follow the directions for cleaning the machine. When should you call for help? Call your doctor now or seek immediate medical care if:    · You have a new or higher fever.     · You have a fever with a stiff neck or severe headache.     · You have new or worse trouble swallowing.     · Your sore throat gets much worse on one side.     · Your pain becomes much worse on one side of your throat.    Watch closely for changes in your health, and be sure to contact your doctor if:    · You are not getting better after 2 days (48 hours).     · You do not get better as expected. Where can you learn more? Go to [email or web address]. org and sign in to your [employer] account. Enter K625 in the [employer] box to learn more about \"Strep Throat: Care Instructions. \"     If you do not have an account, please click on the \"Sign Up Now\" link. Current as of: July 28, 2019  Content Version: 12.3  © 1058-5670 [employer]. Care instructions adapted under license by [facility] ([facility]). Health Care Proxy (HCP)

## 2020-02-12 LAB
-  AMIKACIN: SIGNIFICANT CHANGE UP
-  AMIKACIN: SIGNIFICANT CHANGE UP
-  AMPICILLIN/SULBACTAM: SIGNIFICANT CHANGE UP
-  AMPICILLIN/SULBACTAM: SIGNIFICANT CHANGE UP
-  AMPICILLIN: SIGNIFICANT CHANGE UP
-  AMPICILLIN: SIGNIFICANT CHANGE UP
-  AZTREONAM: SIGNIFICANT CHANGE UP
-  AZTREONAM: SIGNIFICANT CHANGE UP
-  CEFAZOLIN: SIGNIFICANT CHANGE UP
-  CEFAZOLIN: SIGNIFICANT CHANGE UP
-  CEFEPIME: SIGNIFICANT CHANGE UP
-  CEFEPIME: SIGNIFICANT CHANGE UP
-  CEFOXITIN: SIGNIFICANT CHANGE UP
-  CEFOXITIN: SIGNIFICANT CHANGE UP
-  CEFTRIAXONE: SIGNIFICANT CHANGE UP
-  CEFTRIAXONE: SIGNIFICANT CHANGE UP
-  CIPROFLOXACIN: SIGNIFICANT CHANGE UP
-  CIPROFLOXACIN: SIGNIFICANT CHANGE UP
-  GENTAMICIN: SIGNIFICANT CHANGE UP
-  GENTAMICIN: SIGNIFICANT CHANGE UP
-  IMIPENEM: SIGNIFICANT CHANGE UP
-  LEVOFLOXACIN: SIGNIFICANT CHANGE UP
-  LEVOFLOXACIN: SIGNIFICANT CHANGE UP
-  MEROPENEM: SIGNIFICANT CHANGE UP
-  MEROPENEM: SIGNIFICANT CHANGE UP
-  NITROFURANTOIN: SIGNIFICANT CHANGE UP
-  NITROFURANTOIN: SIGNIFICANT CHANGE UP
-  PIPERACILLIN/TAZOBACTAM: SIGNIFICANT CHANGE UP
-  PIPERACILLIN/TAZOBACTAM: SIGNIFICANT CHANGE UP
-  TIGECYCLINE: SIGNIFICANT CHANGE UP
-  TOBRAMYCIN: SIGNIFICANT CHANGE UP
-  TOBRAMYCIN: SIGNIFICANT CHANGE UP
-  TRIMETHOPRIM/SULFAMETHOXAZOLE: SIGNIFICANT CHANGE UP
-  TRIMETHOPRIM/SULFAMETHOXAZOLE: SIGNIFICANT CHANGE UP
CULTURE RESULTS: SIGNIFICANT CHANGE UP
METHOD TYPE: SIGNIFICANT CHANGE UP
METHOD TYPE: SIGNIFICANT CHANGE UP
ORGANISM # SPEC MICROSCOPIC CNT: SIGNIFICANT CHANGE UP
SPECIMEN SOURCE: SIGNIFICANT CHANGE UP

## 2020-02-14 DIAGNOSIS — Z79.82 LONG TERM (CURRENT) USE OF ASPIRIN: ICD-10-CM

## 2020-02-14 DIAGNOSIS — I25.10 ATHEROSCLEROTIC HEART DISEASE OF NATIVE CORONARY ARTERY WITHOUT ANGINA PECTORIS: ICD-10-CM

## 2020-02-14 DIAGNOSIS — F32.9 MAJOR DEPRESSIVE DISORDER, SINGLE EPISODE, UNSPECIFIED: ICD-10-CM

## 2020-02-14 DIAGNOSIS — R07.9 CHEST PAIN, UNSPECIFIED: ICD-10-CM

## 2020-02-14 DIAGNOSIS — F41.9 ANXIETY DISORDER, UNSPECIFIED: ICD-10-CM

## 2020-02-14 DIAGNOSIS — J96.10 CHRONIC RESPIRATORY FAILURE, UNSPECIFIED WHETHER WITH HYPOXIA OR HYPERCAPNIA: ICD-10-CM

## 2020-02-14 DIAGNOSIS — F31.9 BIPOLAR DISORDER, UNSPECIFIED: ICD-10-CM

## 2020-02-14 DIAGNOSIS — I50.22 CHRONIC SYSTOLIC (CONGESTIVE) HEART FAILURE: ICD-10-CM

## 2020-02-14 DIAGNOSIS — J44.9 CHRONIC OBSTRUCTIVE PULMONARY DISEASE, UNSPECIFIED: ICD-10-CM

## 2020-02-14 DIAGNOSIS — G47.33 OBSTRUCTIVE SLEEP APNEA (ADULT) (PEDIATRIC): ICD-10-CM

## 2020-02-14 DIAGNOSIS — Z87.891 PERSONAL HISTORY OF NICOTINE DEPENDENCE: ICD-10-CM

## 2020-02-14 DIAGNOSIS — S09.8XXA OTHER SPECIFIED INJURIES OF HEAD, INITIAL ENCOUNTER: ICD-10-CM

## 2020-02-14 DIAGNOSIS — I10 ESSENTIAL (PRIMARY) HYPERTENSION: ICD-10-CM

## 2020-02-14 DIAGNOSIS — N39.0 URINARY TRACT INFECTION, SITE NOT SPECIFIED: ICD-10-CM
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
## 2020-02-14 DIAGNOSIS — Z86.73 PERSONAL HISTORY OF TRANSIENT ISCHEMIC ATTACK (TIA), AND CEREBRAL INFARCTION WITHOUT RESIDUAL DEFICITS: ICD-10-CM

## 2020-09-10 NOTE — CONSULT NOTE ADULT - I WAS PHYSICALLY PRESENT FOR THE KEY PORTIONS OF THE EVALUATION AND MANAGEMENT (E/M) SERVICE PROVIDED.  I AGREE WITH THE ABOVE HISTORY, PHYSICAL, AND PLAN WHICH I HAVE REVIEWED AND EDITED WHERE APPROPRIATE
2 RN Skin Check     2 RN skin check completed with CURT Lan.  Devices in place: heel float boots  Skin assessed under devices: yes  Confirmed pressure ulcers found on: N/A  New potential pressure ulcers noted on N/A Wound consult placed N/A        The following interventions in place: pillows for support/positioning, waffle overlay, frequent incontinence checks and clean dry linens, barrier paste, 2RN skin checks and Q2 turns, Mepilex, heel float boots        Skin Assessment     Bilateral ears pink, blanching.  BUE and abdomen has bruising.  Sacrum pink/red, blanching.  BLE have swelling. Bruising on right lower leg.  Bilateral heels red, boggy, and blanching. Changed mepilex. Heel float boots in place bilaterally.   Statement Selected

## 2020-10-14 ENCOUNTER — INPATIENT (INPATIENT)
Facility: HOSPITAL | Age: 75
LOS: 3 days | Discharge: HOME | End: 2020-10-18
Attending: FAMILY MEDICINE | Admitting: FAMILY MEDICINE
Payer: MEDICARE

## 2020-10-14 VITALS
SYSTOLIC BLOOD PRESSURE: 114 MMHG | TEMPERATURE: 97 F | RESPIRATION RATE: 18 BRPM | DIASTOLIC BLOOD PRESSURE: 56 MMHG | OXYGEN SATURATION: 98 % | HEART RATE: 83 BPM | HEIGHT: 62 IN | WEIGHT: 179.9 LBS

## 2020-10-14 DIAGNOSIS — J44.9 CHRONIC OBSTRUCTIVE PULMONARY DISEASE, UNSPECIFIED: ICD-10-CM

## 2020-10-14 DIAGNOSIS — R47.1 DYSARTHRIA AND ANARTHRIA: ICD-10-CM

## 2020-10-14 DIAGNOSIS — T50.915A ADVERSE EFFECT OF MULTIPLE UNSPECIFIED DRUGS, MEDICAMENTS AND BIOLOGICAL SUBSTANCES, INITIAL ENCOUNTER: ICD-10-CM

## 2020-10-14 DIAGNOSIS — Z91.041 RADIOGRAPHIC DYE ALLERGY STATUS: ICD-10-CM

## 2020-10-14 DIAGNOSIS — Z88.8 ALLERGY STATUS TO OTHER DRUGS, MEDICAMENTS AND BIOLOGICAL SUBSTANCES: ICD-10-CM

## 2020-10-14 DIAGNOSIS — Z87.891 PERSONAL HISTORY OF NICOTINE DEPENDENCE: ICD-10-CM

## 2020-10-14 DIAGNOSIS — I65.23 OCCLUSION AND STENOSIS OF BILATERAL CAROTID ARTERIES: ICD-10-CM

## 2020-10-14 DIAGNOSIS — Z99.81 DEPENDENCE ON SUPPLEMENTAL OXYGEN: ICD-10-CM

## 2020-10-14 DIAGNOSIS — R47.81 SLURRED SPEECH: ICD-10-CM

## 2020-10-14 DIAGNOSIS — F31.9 BIPOLAR DISORDER, UNSPECIFIED: ICD-10-CM

## 2020-10-14 DIAGNOSIS — I50.32 CHRONIC DIASTOLIC (CONGESTIVE) HEART FAILURE: ICD-10-CM

## 2020-10-14 DIAGNOSIS — R20.0 ANESTHESIA OF SKIN: ICD-10-CM

## 2020-10-14 DIAGNOSIS — E78.5 HYPERLIPIDEMIA, UNSPECIFIED: ICD-10-CM

## 2020-10-14 DIAGNOSIS — Z90.49 ACQUIRED ABSENCE OF OTHER SPECIFIED PARTS OF DIGESTIVE TRACT: Chronic | ICD-10-CM

## 2020-10-14 DIAGNOSIS — G47.33 OBSTRUCTIVE SLEEP APNEA (ADULT) (PEDIATRIC): ICD-10-CM

## 2020-10-14 DIAGNOSIS — I11.0 HYPERTENSIVE HEART DISEASE WITH HEART FAILURE: ICD-10-CM

## 2020-10-14 DIAGNOSIS — I48.91 UNSPECIFIED ATRIAL FIBRILLATION: ICD-10-CM

## 2020-10-14 DIAGNOSIS — Z88.5 ALLERGY STATUS TO NARCOTIC AGENT: ICD-10-CM

## 2020-10-14 LAB
ALBUMIN SERPL ELPH-MCNC: 3.9 G/DL — SIGNIFICANT CHANGE UP (ref 3.5–5.2)
ALP SERPL-CCNC: 141 U/L — HIGH (ref 30–115)
ALT FLD-CCNC: 19 U/L — SIGNIFICANT CHANGE UP (ref 0–41)
ANION GAP SERPL CALC-SCNC: 10 MMOL/L — SIGNIFICANT CHANGE UP (ref 7–14)
APTT BLD: 33 SEC — SIGNIFICANT CHANGE UP (ref 27–39.2)
AST SERPL-CCNC: 27 U/L — SIGNIFICANT CHANGE UP (ref 0–41)
BASOPHILS # BLD AUTO: 0.03 K/UL — SIGNIFICANT CHANGE UP (ref 0–0.2)
BASOPHILS NFR BLD AUTO: 0.6 % — SIGNIFICANT CHANGE UP (ref 0–1)
BILIRUB SERPL-MCNC: 0.4 MG/DL — SIGNIFICANT CHANGE UP (ref 0.2–1.2)
BLD GP AB SCN SERPL QL: SIGNIFICANT CHANGE UP
BUN SERPL-MCNC: 14 MG/DL — SIGNIFICANT CHANGE UP (ref 10–20)
CALCIUM SERPL-MCNC: 9.6 MG/DL — SIGNIFICANT CHANGE UP (ref 8.5–10.1)
CHLORIDE SERPL-SCNC: 106 MMOL/L — SIGNIFICANT CHANGE UP (ref 98–110)
CK MB CFR SERPL CALC: 6.4 NG/ML — HIGH (ref 0.6–6.3)
CK SERPL-CCNC: 64 U/L — SIGNIFICANT CHANGE UP (ref 0–225)
CO2 SERPL-SCNC: 23 MMOL/L — SIGNIFICANT CHANGE UP (ref 17–32)
CREAT SERPL-MCNC: 1 MG/DL — SIGNIFICANT CHANGE UP (ref 0.7–1.5)
EOSINOPHIL # BLD AUTO: 0.14 K/UL — SIGNIFICANT CHANGE UP (ref 0–0.7)
EOSINOPHIL NFR BLD AUTO: 2.8 % — SIGNIFICANT CHANGE UP (ref 0–8)
GLUCOSE SERPL-MCNC: 125 MG/DL — HIGH (ref 70–99)
HCT VFR BLD CALC: 38.5 % — SIGNIFICANT CHANGE UP (ref 37–47)
HGB BLD-MCNC: 12.7 G/DL — SIGNIFICANT CHANGE UP (ref 12–16)
IMM GRANULOCYTES NFR BLD AUTO: 0.2 % — SIGNIFICANT CHANGE UP (ref 0.1–0.3)
INR BLD: 1.19 RATIO — SIGNIFICANT CHANGE UP (ref 0.65–1.3)
LACTATE SERPL-SCNC: 2.1 MMOL/L — HIGH (ref 0.7–2)
LYMPHOCYTES # BLD AUTO: 1.1 K/UL — LOW (ref 1.2–3.4)
LYMPHOCYTES # BLD AUTO: 21.6 % — SIGNIFICANT CHANGE UP (ref 20.5–51.1)
MCHC RBC-ENTMCNC: 31.3 PG — HIGH (ref 27–31)
MCHC RBC-ENTMCNC: 33 G/DL — SIGNIFICANT CHANGE UP (ref 32–37)
MCV RBC AUTO: 94.8 FL — SIGNIFICANT CHANGE UP (ref 81–99)
MONOCYTES # BLD AUTO: 0.5 K/UL — SIGNIFICANT CHANGE UP (ref 0.1–0.6)
MONOCYTES NFR BLD AUTO: 9.8 % — HIGH (ref 1.7–9.3)
NEUTROPHILS # BLD AUTO: 3.31 K/UL — SIGNIFICANT CHANGE UP (ref 1.4–6.5)
NEUTROPHILS NFR BLD AUTO: 65 % — SIGNIFICANT CHANGE UP (ref 42.2–75.2)
NRBC # BLD: 0 /100 WBCS — SIGNIFICANT CHANGE UP (ref 0–0)
PLATELET # BLD AUTO: 221 K/UL — SIGNIFICANT CHANGE UP (ref 130–400)
POTASSIUM SERPL-MCNC: 3.3 MMOL/L — LOW (ref 3.5–5)
POTASSIUM SERPL-SCNC: 3.3 MMOL/L — LOW (ref 3.5–5)
PROT SERPL-MCNC: 6.3 G/DL — SIGNIFICANT CHANGE UP (ref 6–8)
PROTHROM AB SERPL-ACNC: 13.7 SEC — HIGH (ref 9.95–12.87)
RAPID RVP RESULT: SIGNIFICANT CHANGE UP
RBC # BLD: 4.06 M/UL — LOW (ref 4.2–5.4)
RBC # FLD: 14.2 % — SIGNIFICANT CHANGE UP (ref 11.5–14.5)
SARS-COV-2 RNA SPEC QL NAA+PROBE: SIGNIFICANT CHANGE UP
SODIUM SERPL-SCNC: 139 MMOL/L — SIGNIFICANT CHANGE UP (ref 135–146)
TROPONIN T SERPL-MCNC: 0.01 NG/ML — SIGNIFICANT CHANGE UP
WBC # BLD: 5.09 K/UL — SIGNIFICANT CHANGE UP (ref 4.8–10.8)
WBC # FLD AUTO: 5.09 K/UL — SIGNIFICANT CHANGE UP (ref 4.8–10.8)

## 2020-10-14 PROCEDURE — ZZZZZ: CPT

## 2020-10-14 PROCEDURE — 70450 CT HEAD/BRAIN W/O DYE: CPT | Mod: 26

## 2020-10-14 PROCEDURE — 71045 X-RAY EXAM CHEST 1 VIEW: CPT | Mod: 26

## 2020-10-14 PROCEDURE — 99291 CRITICAL CARE FIRST HOUR: CPT | Mod: CS

## 2020-10-14 PROCEDURE — 99222 1ST HOSP IP/OBS MODERATE 55: CPT

## 2020-10-14 RX ORDER — GUAIFENESIN/DEXTROMETHORPHAN 600MG-30MG
10 TABLET, EXTENDED RELEASE 12 HR ORAL EVERY 4 HOURS
Refills: 0 | Status: DISCONTINUED | OUTPATIENT
Start: 2020-10-14 | End: 2020-10-18

## 2020-10-14 RX ORDER — FUROSEMIDE 40 MG
40 TABLET ORAL DAILY
Refills: 0 | Status: DISCONTINUED | OUTPATIENT
Start: 2020-10-14 | End: 2020-10-18

## 2020-10-14 RX ORDER — CHLORHEXIDINE GLUCONATE 213 G/1000ML
1 SOLUTION TOPICAL
Refills: 0 | Status: DISCONTINUED | OUTPATIENT
Start: 2020-10-14 | End: 2020-10-18

## 2020-10-14 RX ORDER — LAMOTRIGINE 25 MG/1
25 TABLET, ORALLY DISINTEGRATING ORAL
Refills: 0 | Status: DISCONTINUED | OUTPATIENT
Start: 2020-10-14 | End: 2020-10-18

## 2020-10-14 RX ORDER — POTASSIUM CHLORIDE 20 MEQ
20 PACKET (EA) ORAL
Refills: 0 | Status: COMPLETED | OUTPATIENT
Start: 2020-10-14 | End: 2020-10-14

## 2020-10-14 RX ORDER — METOPROLOL TARTRATE 50 MG
100 TABLET ORAL
Refills: 0 | Status: DISCONTINUED | OUTPATIENT
Start: 2020-10-14 | End: 2020-10-18

## 2020-10-14 RX ORDER — BUDESONIDE AND FORMOTEROL FUMARATE DIHYDRATE 160; 4.5 UG/1; UG/1
2 AEROSOL RESPIRATORY (INHALATION)
Refills: 0 | Status: DISCONTINUED | OUTPATIENT
Start: 2020-10-14 | End: 2020-10-18

## 2020-10-14 RX ORDER — QUETIAPINE FUMARATE 200 MG/1
100 TABLET, FILM COATED ORAL AT BEDTIME
Refills: 0 | Status: DISCONTINUED | OUTPATIENT
Start: 2020-10-14 | End: 2020-10-14

## 2020-10-14 RX ORDER — ATORVASTATIN CALCIUM 80 MG/1
80 TABLET, FILM COATED ORAL AT BEDTIME
Refills: 0 | Status: DISCONTINUED | OUTPATIENT
Start: 2020-10-14 | End: 2020-10-18

## 2020-10-14 RX ORDER — PANTOPRAZOLE SODIUM 20 MG/1
40 TABLET, DELAYED RELEASE ORAL
Refills: 0 | Status: DISCONTINUED | OUTPATIENT
Start: 2020-10-14 | End: 2020-10-18

## 2020-10-14 RX ORDER — ENOXAPARIN SODIUM 100 MG/ML
40 INJECTION SUBCUTANEOUS DAILY
Refills: 0 | Status: DISCONTINUED | OUTPATIENT
Start: 2020-10-14 | End: 2020-10-16

## 2020-10-14 RX ORDER — GABAPENTIN 400 MG/1
300 CAPSULE ORAL THREE TIMES A DAY
Refills: 0 | Status: DISCONTINUED | OUTPATIENT
Start: 2020-10-14 | End: 2020-10-18

## 2020-10-14 RX ORDER — MONTELUKAST 4 MG/1
10 TABLET, CHEWABLE ORAL DAILY
Refills: 0 | Status: DISCONTINUED | OUTPATIENT
Start: 2020-10-14 | End: 2020-10-18

## 2020-10-14 RX ORDER — ATORVASTATIN CALCIUM 80 MG/1
80 TABLET, FILM COATED ORAL ONCE
Refills: 0 | Status: COMPLETED | OUTPATIENT
Start: 2020-10-14 | End: 2020-10-14

## 2020-10-14 RX ORDER — GUAIFENESIN/DEXTROMETHORPHAN 600MG-30MG
10 TABLET, EXTENDED RELEASE 12 HR ORAL EVERY 4 HOURS
Refills: 0 | Status: DISCONTINUED | OUTPATIENT
Start: 2020-10-14 | End: 2020-10-14

## 2020-10-14 RX ORDER — ENOXAPARIN SODIUM 100 MG/ML
80 INJECTION SUBCUTANEOUS ONCE
Refills: 0 | Status: COMPLETED | OUTPATIENT
Start: 2020-10-14 | End: 2020-10-14

## 2020-10-14 RX ORDER — ASPIRIN/CALCIUM CARB/MAGNESIUM 324 MG
81 TABLET ORAL DAILY
Refills: 0 | Status: DISCONTINUED | OUTPATIENT
Start: 2020-10-14 | End: 2020-10-18

## 2020-10-14 RX ADMIN — ENOXAPARIN SODIUM 80 MILLIGRAM(S): 100 INJECTION SUBCUTANEOUS at 20:55

## 2020-10-14 NOTE — ED PROVIDER NOTE - ATTENDING CONTRIBUTION TO CARE
I personally evaluated the patient. I reviewed the Resident’s or Physician Assistant’s note (as assigned above), and agree with the findings and plan except as documented in my note.     75 female here for slurred speech by  as witness, he was at work when he noted she was abnormal via phone. Called EMS and was found to be in atrial fibrillation with rapid rate, presumed new, and was DC cardioverted after sedation in the field by EMS. Came to ED in NSR with hypotension and persistent speech slurring.     PMH: significant disease burden  Rx: recently added muscle relaxants and tramadol    ROS otherwise unremarkable    GEN: elderly female in no distress.   HEENT: non icteric conjunctiva pink. mucosa normal. throat normal. EOMI PERRLA  CHEST: CTA bilateral. normal work of breathing.   NECK: no meningismus   CV: pulses intact S1S2   ABD: soft, non rigid, no guarding noted, non distended  EXT: FROM x 4 NVI   NEURO: AAO 3 no focal deficits. Deconditioned, speech slurred, no focal deficits. globally weak   SKIN: no pallor no diaphoresis  PSYCH: depressed mood and mentation

## 2020-10-14 NOTE — H&P ADULT - HISTORY OF PRESENT ILLNESS
pt is a 74 yo f h/o HTN, HLD, bipolar disorder, CHF, COPD on 4L NC at home noted to have slurred speech noted by . As per report EMS found pt without verbal response Afib, she was cardioverted in the field and arrived to ER in NSR. On arrival pt awake and alert c/o generalized weakness, mild, no specific pain or radiation. Stroke code called, head CT without pathology. pt with slurred speech, no facail droop or unilateral weakness.    reports pt took tramadol and a muscle relaxer for pain.   Denies fever, chills, cp, sob,   abd pain, N/V/D dizziness, numbness, tingling.

## 2020-10-14 NOTE — H&P ADULT - NSHPLABSRESULTS_GEN_ALL_CORE
12.7   5.09  )-----------( 221      ( 14 Oct 2020 17:06 )             38.5  10-14    139  |  106  |  14  ----------------------------<  125<H>  3.3<L>   |  23  |  1.0    Ca    9.6      14 Oct 2020 17:06    TPro  6.3  /  Alb  3.9  /  TBili  0.4  /  DBili  x   /  AST  27  /  ALT  19  /  AlkPhos  141<H>  10-14    Troponin T, Serum (10.14.20 @ 17:06)    Troponin T, Serum: 0.01 ng/mL    < from: CT Brain Stroke Protocol (10.14.20 @ 16:50) >      IMPRESSION:  No acute intracranial pathology. No evidence of midline shift, mass effect or intracranial hemorrhage.    Mild chronic microvascular-type changes.    These findings were discussed with Dr. Cordero at 10/14/2020 4:54 PM by Dr. Waller with read back confirmation.    < end of copied text >

## 2020-10-14 NOTE — H&P ADULT - ASSESSMENT
76 yo f h/o HTN, HLD, bipolar disorder, CHF, COPD on home O2 p/w slurred speech and s/p episode of afib converted in the field now in NSR.     #stoke code  head CT negative  no deficits noted  neuro consulted in ER   polypharmacy also likely as  states pt took tramadol and muscle relaxer   neuro checks   will limit cns depressants   continue statin  f/u labs, lipids   AC started in ER     #afib s/p cardiovert in the field  cardiac monitoring ekg   cardio consult     #copd on home 02   O2 prn, bipap prn   continue singular     # HTN   continue lopressor, norvasc     #CHF   continue lasix     #bipolar   continue Lamictal     Dvt ppx   gi ppx   OOB   Full code

## 2020-10-14 NOTE — ED PROVIDER NOTE - PHYSICAL EXAMINATION
Physical Exam    Vital Signs: I have reviewed the initial vital signs.  Constitutional: well-nourished, appears stated age, no acute distress  Eyes: Conjunctiva pink, Sclera clear.  Cardiovascular: S1 and S2, regular rate, regular rhythm, well-perfused extremities, radial pulses equal and 2+, pedal pulses 2+ and equal   Respiratory: unlabored respiratory effort, clear to auscultation bilaterally no wheezing, rales and rhonchi  Gastrointestinal: soft, non-tender abdomen, no pulsatile mass, normal bowl sounds  Musculoskeletal: supple neck, no lower extremity edema, no midline tenderness  Integumentary: warm, dry, no rash  Neurologic: awake, alert, cranial nerves II-XII grossly intact, extremities’ motor and sensory functions grossly intact, no pronator drift, strength and sensation intact, slurred speech.

## 2020-10-14 NOTE — ED PROVIDER NOTE - CARE PLAN
Principal Discharge DX:	Slurred speech  Secondary Diagnosis:	Atrial fibrillation  Secondary Diagnosis:	Polypharmacy

## 2020-10-14 NOTE — ED ADULT TRIAGE NOTE - CHIEF COMPLAINT QUOTE
as per , he was at work and patient called him not talking right, ems found patient in rapid afib, gave 10 mg etomidate via 24 g iv in right hand and cardioverted patient at 50 J. pt converted afterwards.

## 2020-10-14 NOTE — ED PROVIDER NOTE - CLINICAL SUMMARY MEDICAL DECISION MAKING FREE TEXT BOX
75 female here for slurred speech. Had stroke code called on arrival for EMS prenotification of new atrial fib requiring DC cardioversion for hypotension. In ED had stroke code run with no acute findings, still persistent aphasia. Additional HPI from  who arrived later provided insight to possible polypharmacy, patient states she took tramadol and muscle relaxants today for discomfort. No intent to harm self. Had screening labs imaging cardiac monitoring medications and reevaluation, plan is for inpatient admission to monitored setting for continued management.

## 2020-10-14 NOTE — ED ADULT NURSE NOTE - NSIMPLEMENTINTERV_GEN_ALL_ED
Implemented All Fall with Harm Risk Interventions:  [place] to call system. Call bell, personal items and telephone within reach. Instruct patient to call for assistance. Room bathroom lighting operational. Non-slip footwear when patient is off stretcher. Physically safe environment: no spills, clutter or unnecessary equipment. Stretcher in lowest position, wheels locked, appropriate side rails in place. Provide visual cue, wrist band, yellow gown, etc. Monitor gait and stability. Monitor for mental status changes and reorient to person, place, and time. Review medications for side effects contributing to fall risk. Reinforce activity limits and safety measures with patient and family. Provide visual clues: red socks.

## 2020-10-14 NOTE — H&P ADULT - NSHPPHYSICALEXAM_GEN_ALL_CORE
ICU Vital Signs Last 24 Hrs  T(C): 36.2 (14 Oct 2020 16:58), Max: 36.2 (14 Oct 2020 16:58)  T(F): 97.2 (14 Oct 2020 16:58), Max: 97.2 (14 Oct 2020 16:58)  HR: 75 (14 Oct 2020 20:34) (75 - 83)  BP: 120/60 (14 Oct 2020 20:34) (103/58 - 120/60)  RR: 18 (14 Oct 2020 20:34) (18 - 18)  SpO2: 100% (14 Oct 2020 20:34) (98% - 100%)    Constitutional: mild respiratory distress on 4 L NC   HEENT: Airway patent, moist MM, no erythema/swelling/deformity of oral structures. EOMI, PERRLA.  CV: regular rate, regular rhythm, well-perfused extremities, 2+ b/l DP and radial pulses equal.  Lungs: BCTA, no increased WOB.  ABD: NTND, no guarding or rebound, no pulsatile mass, no hernias.   MSK: Neck supple, nontender, nl ROM, no stepoff. Chest nontender. Back nontender in TLS spine or to b/l bony structures or flanks. Ext nontender, nl rom, no deformity.   INTEG: Skin warm, dry, no rash.  NEURO: A&Ox3, normal strength, nl sensation throughout, normal speech.   PSYCH: Denies SI/HI/hallucinations

## 2020-10-14 NOTE — ED PROVIDER NOTE - OBJECTIVE STATEMENT
74 yo female, pmh of htn, hld, bipolar do, cvs, chf, copd on 5l nc, presents to ed for weakness. Pt called  at around 3pm,  noted pt had slurred speech at that time. when ems arrived to pts house, pt was unresponsive in afib, pt cardioverted in field and converted to ns and became responsive with normal VS. Pt at this time c/o generalized weakness, mild, no specific pain or radiation. Denies fever, chills, cp, sob, le swelling, abd pain, nvd, dizziness, numbness, tingling.

## 2020-10-14 NOTE — ED PROVIDER NOTE - CRITICAL CARE PROVIDED
additional history taking/documentation/consult w/ pt's family directly relating to pts condition/interpretation of diagnostic studies/consultation with other physicians/direct patient care (not related to procedure)/telephone consultation with the patient's family/conducted a detailed discussion of DNR status

## 2020-10-14 NOTE — ED PROVIDER NOTE - PROGRESS NOTE DETAILS
spoke to [facility] neuro, no rec for tpa candidate, no rec for [facility] transfer, rec c[facility] admit, high dose statin, if no bleed ac, pt  stats anaphylaxis to iv contrast. rocky aware of pt, carmina aware.
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
## 2020-10-15 LAB
AMPHET UR-MCNC: NEGATIVE — SIGNIFICANT CHANGE UP
ANION GAP SERPL CALC-SCNC: 12 MMOL/L — SIGNIFICANT CHANGE UP (ref 7–14)
BARBITURATES UR SCN-MCNC: NEGATIVE — SIGNIFICANT CHANGE UP
BENZODIAZ UR-MCNC: NEGATIVE — SIGNIFICANT CHANGE UP
BUN SERPL-MCNC: 11 MG/DL — SIGNIFICANT CHANGE UP (ref 10–20)
CALCIUM SERPL-MCNC: 9.7 MG/DL — SIGNIFICANT CHANGE UP (ref 8.5–10.1)
CHLORIDE SERPL-SCNC: 110 MMOL/L — SIGNIFICANT CHANGE UP (ref 98–110)
CHOLEST SERPL-MCNC: 187 MG/DL — SIGNIFICANT CHANGE UP (ref 100–200)
CK MB CFR SERPL CALC: 8 NG/ML — HIGH (ref 0.6–6.3)
CK SERPL-CCNC: 69 U/L — SIGNIFICANT CHANGE UP (ref 0–225)
CO2 SERPL-SCNC: 22 MMOL/L — SIGNIFICANT CHANGE UP (ref 17–32)
COCAINE METAB.OTHER UR-MCNC: NEGATIVE — SIGNIFICANT CHANGE UP
CREAT SERPL-MCNC: 0.9 MG/DL — SIGNIFICANT CHANGE UP (ref 0.7–1.5)
D DIMER BLD IA.RAPID-MCNC: 145 NG/ML DDU — SIGNIFICANT CHANGE UP (ref 0–230)
D DIMER BLD IA.RAPID-MCNC: 199 NG/ML DDU — SIGNIFICANT CHANGE UP (ref 0–230)
DRUG SCREEN 1, URINE RESULT: SIGNIFICANT CHANGE UP
GLUCOSE SERPL-MCNC: 79 MG/DL — SIGNIFICANT CHANGE UP (ref 70–99)
HCT VFR BLD CALC: 41.1 % — SIGNIFICANT CHANGE UP (ref 37–47)
HDLC SERPL-MCNC: 41 MG/DL — LOW
HGB BLD-MCNC: 13.3 G/DL — SIGNIFICANT CHANGE UP (ref 12–16)
LIPID PNL WITH DIRECT LDL SERPL: 107 MG/DL — SIGNIFICANT CHANGE UP (ref 4–129)
MCHC RBC-ENTMCNC: 31.4 PG — HIGH (ref 27–31)
MCHC RBC-ENTMCNC: 32.4 G/DL — SIGNIFICANT CHANGE UP (ref 32–37)
MCV RBC AUTO: 96.9 FL — SIGNIFICANT CHANGE UP (ref 81–99)
METHADONE UR-MCNC: NEGATIVE — SIGNIFICANT CHANGE UP
NRBC # BLD: 0 /100 WBCS — SIGNIFICANT CHANGE UP (ref 0–0)
OPIATES UR-MCNC: NEGATIVE — SIGNIFICANT CHANGE UP
PCP UR-MCNC: NEGATIVE — SIGNIFICANT CHANGE UP
PLATELET # BLD AUTO: 249 K/UL — SIGNIFICANT CHANGE UP (ref 130–400)
POTASSIUM SERPL-MCNC: 4.3 MMOL/L — SIGNIFICANT CHANGE UP (ref 3.5–5)
POTASSIUM SERPL-SCNC: 4.3 MMOL/L — SIGNIFICANT CHANGE UP (ref 3.5–5)
PROPOXYPHENE QUALITATIVE URINE RESULT: NEGATIVE — SIGNIFICANT CHANGE UP
RBC # BLD: 4.24 M/UL — SIGNIFICANT CHANGE UP (ref 4.2–5.4)
RBC # FLD: 14.4 % — SIGNIFICANT CHANGE UP (ref 11.5–14.5)
SODIUM SERPL-SCNC: 144 MMOL/L — SIGNIFICANT CHANGE UP (ref 135–146)
THC UR QL: NEGATIVE — SIGNIFICANT CHANGE UP
TOTAL CHOLESTEROL/HDL RATIO MEASUREMENT: 4.6 RATIO — SIGNIFICANT CHANGE UP (ref 4–5.5)
TRIGL SERPL-MCNC: 220 MG/DL — HIGH (ref 10–149)
TROPONIN T SERPL-MCNC: 0.02 NG/ML — HIGH
TROPONIN T SERPL-MCNC: 0.05 NG/ML — CRITICAL HIGH
TROPONIN T SERPL-MCNC: <0.01 NG/ML — SIGNIFICANT CHANGE UP
TSH SERPL-MCNC: 1.37 UIU/ML — SIGNIFICANT CHANGE UP (ref 0.27–4.2)
WBC # BLD: 5.86 K/UL — SIGNIFICANT CHANGE UP (ref 4.8–10.8)
WBC # FLD AUTO: 5.86 K/UL — SIGNIFICANT CHANGE UP (ref 4.8–10.8)

## 2020-10-15 PROCEDURE — 99222 1ST HOSP IP/OBS MODERATE 55: CPT

## 2020-10-15 PROCEDURE — 70547 MR ANGIOGRAPHY NECK W/O DYE: CPT | Mod: 26

## 2020-10-15 PROCEDURE — 99233 SBSQ HOSP IP/OBS HIGH 50: CPT

## 2020-10-15 PROCEDURE — 70544 MR ANGIOGRAPHY HEAD W/O DYE: CPT | Mod: 26,59

## 2020-10-15 PROCEDURE — 93880 EXTRACRANIAL BILAT STUDY: CPT | Mod: 26

## 2020-10-15 PROCEDURE — 70551 MRI BRAIN STEM W/O DYE: CPT | Mod: 26

## 2020-10-15 RX ORDER — DULOXETINE HYDROCHLORIDE 30 MG/1
30 CAPSULE, DELAYED RELEASE ORAL DAILY
Refills: 0 | Status: DISCONTINUED | OUTPATIENT
Start: 2020-10-15 | End: 2020-10-16

## 2020-10-15 RX ORDER — AMLODIPINE BESYLATE 2.5 MG/1
2.5 TABLET ORAL DAILY
Refills: 0 | Status: DISCONTINUED | OUTPATIENT
Start: 2020-10-15 | End: 2020-10-18

## 2020-10-15 RX ORDER — QUETIAPINE FUMARATE 200 MG/1
200 TABLET, FILM COATED ORAL DAILY
Refills: 0 | Status: DISCONTINUED | OUTPATIENT
Start: 2020-10-15 | End: 2020-10-18

## 2020-10-15 RX ADMIN — PANTOPRAZOLE SODIUM 40 MILLIGRAM(S): 20 TABLET, DELAYED RELEASE ORAL at 07:43

## 2020-10-15 RX ADMIN — BUDESONIDE AND FORMOTEROL FUMARATE DIHYDRATE 2 PUFF(S): 160; 4.5 AEROSOL RESPIRATORY (INHALATION) at 21:52

## 2020-10-15 RX ADMIN — Medication 0.5 MILLIGRAM(S): at 22:29

## 2020-10-15 RX ADMIN — Medication 100 MILLIGRAM(S): at 07:43

## 2020-10-15 RX ADMIN — BUDESONIDE AND FORMOTEROL FUMARATE DIHYDRATE 2 PUFF(S): 160; 4.5 AEROSOL RESPIRATORY (INHALATION) at 09:55

## 2020-10-15 RX ADMIN — LAMOTRIGINE 25 MILLIGRAM(S): 25 TABLET, ORALLY DISINTEGRATING ORAL at 21:52

## 2020-10-15 RX ADMIN — ATORVASTATIN CALCIUM 80 MILLIGRAM(S): 80 TABLET, FILM COATED ORAL at 21:52

## 2020-10-15 RX ADMIN — Medication 40 MILLIGRAM(S): at 07:43

## 2020-10-15 RX ADMIN — Medication 0.5 MILLIGRAM(S): at 13:01

## 2020-10-15 RX ADMIN — Medication 50 MILLIEQUIVALENT(S): at 00:20

## 2020-10-15 RX ADMIN — MONTELUKAST 10 MILLIGRAM(S): 4 TABLET, CHEWABLE ORAL at 15:32

## 2020-10-15 RX ADMIN — AMLODIPINE BESYLATE 2.5 MILLIGRAM(S): 2.5 TABLET ORAL at 15:33

## 2020-10-15 RX ADMIN — ENOXAPARIN SODIUM 40 MILLIGRAM(S): 100 INJECTION SUBCUTANEOUS at 15:34

## 2020-10-15 RX ADMIN — Medication 100 MILLIGRAM(S): at 21:52

## 2020-10-15 RX ADMIN — Medication 81 MILLIGRAM(S): at 13:01

## 2020-10-15 RX ADMIN — CHLORHEXIDINE GLUCONATE 1 APPLICATION(S): 213 SOLUTION TOPICAL at 05:07

## 2020-10-15 RX ADMIN — Medication 50 MILLIEQUIVALENT(S): at 05:06

## 2020-10-15 NOTE — CONSULT NOTE ADULT - ASSESSMENT
Patient yesterday walked up steps chest pain sob.Then later slurred speech. Patient reportedly cardioverted in field for afib. Now sternum tender. Need r/o mi. Try obtain ems strips.  Neuro. Check d-dimer. AC for now if neuro agrees

## 2020-10-15 NOTE — SWALLOW BEDSIDE ASSESSMENT ADULT - SLP PERTINENT HISTORY OF CURRENT PROBLEM
74 yo f h/o HTN, HLD, bipolar disorder, CHF, COPD on home O2 p/w slurred speech and s/p episode of afib converted in the field now in NSR. Capillary refill less/equal to 2 seconds/Strong peripheral pulses

## 2020-10-15 NOTE — PROGRESS NOTE ADULT - ASSESSMENT
74 yo f h/o HTN, HLD, bipolar disorder, CHF, COPD on home O2 p/w slurred speech and s/p episode of afib converted in the field now in NSR.     #Slurred Speech  - TIA vs stroke vs polypharmacy vs seizure  c/f polypharmacy since  states pt took tramadol and muscle relaxer   head CT negative  no speech defecit or focal neurological defecits at this time,? dec sensation in feet   Plan:  Neuro on consult: MRI/MRA , rEEG,  lipids, A1C, continue ASA/statin, speech eval, 2D echo   will limit cns depressants     #afib s/p cardioversion in the field  #Troponin elevation   will try to obtain EMS strips   Cardiology on consult : r/o MI and check d dimer, AC if ok with neuro     #copd on home 02   O2 prn, bipap prn   continue singular     # HTN   continue lopressor, norvasc     #CHF   continue lasix     #Bipolar   continue Lamictal     Dvt ppx   gi ppx   OOB   Full code     #Progress Note Handoff:  Pending (specify):  MRI/MRA, rEEG, improvement in clinical status  Family discussion: d/w patient   Disposition: Home___/SNF___/Other________/Unknown at this time_x_______      Carmencita Schmidt, DO

## 2020-10-15 NOTE — SWALLOW BEDSIDE ASSESSMENT ADULT - SWALLOW EVAL: ORAL MUSCULATURE
anomalies present/generalized weakness noted with inconsistent presentation of range of motion overall. possible exaggeration of impairment.

## 2020-10-15 NOTE — SWALLOW BEDSIDE ASSESSMENT ADULT - ASR SWALLOW ASPIRATION MONITOR
upper respiratory infection/throat clearing/change of breathing pattern/cough/gurgly voice/pneumonia/oral hygiene/position upright (90Y)/fever

## 2020-10-15 NOTE — CONSULT NOTE ADULT - SUBJECTIVE AND OBJECTIVE BOX
Vascular consult   for:  20-39% stenosis of the right internal carotid artery.  > 80% stenosis of the left internal carotid artery.    working DX : Slurred Speech  - TIA vs stroke vs polypharmacy vs seizure    74 yo f h/o HTN, HLD, bipolar disorder, CHF, COPD on 4L NC at home noted to have slurred speech noted by . As per report EMS found pt without verbal response  Afib, she was cardioverted in the field and arrived to ER in NSR. On arrival pt awake and alert c/o generalized weakness, mild, no specific pain or radiation. Stroke code called, head CT without pathology. pt with slurred speech, no facial droop or unilateral weakness.    reports pt took tramadol and a muscle relaxer for pain.   Denies fever, chills, cp, sob,   abd pain, N/V/D dizziness, numbness, tingling.     Allergies    codeine (Other (Moderate))  Depakote (Unknown)  IV Contrast (Anaphylaxis)  losartan (Angioedema)  Risperdal (Other)  verapamil (Short breath; Angioedema)    Intolerances    PAST MEDICAL & SURGICAL HISTORY:  Falls    Congestive heart failure    Transient ischemic attack    FLAVIO on CPAP    Bipolar 1 disorder    Allergic reaction    PVD (peripheral vascular disease)    Other emphysema    Other cardiomyopathy    TIA (transient ischemic attack)    COPD (chronic obstructive pulmonary disease)    Depression    Hypertension    History of cholecystectomy        Review of Systems: Constitutional: (-) fever (-) chills   Eyes: (-) visual changes  ENMT: (-) nasal or chest congestion (-) runny nose (-) sore throat (-) neck pain (-) neck stiffness  Cardiac: (-) chest pain (-) syncope  Respiratory: (-) cough (-) SOB  GI: (-) nausea (-) vomiting (-) diarrhea  : (-) incontinence  MS:(-) back pain   Neuro: (-) head injury (-) headache (-) dizziness (-) numbness/tingling to extremities B/L (-) LOC   Skin: (-) abrasion (-) rash (-) laceration    Vital Signs Last 24 Hrs  T(C): 35.8 (15 Oct 2020 15:00), Max: 36.7 (15 Oct 2020 05:00)  T(F): 96.5 (15 Oct 2020 15:00), Max: 98 (15 Oct 2020 05:00)  HR: 81 (15 Oct 2020 07:00) (68 - 89)  BP: 168/72 (15 Oct 2020 07:00) (103/58 - 197/80)  BP(mean): 119 (15 Oct 2020 05:29) (77 - 121)  RR: 20 (15 Oct 2020 15:00) (17 - 22)  SpO2: 100% (15 Oct 2020 05:29) (98% - 100%)    Constitutional: NAD, appears stated age   HEENT: Airway patent, moist MM, EOMI, PERRLA.  CV: regular rate, regular rhythm, well-perfused extremities, 2+ b/l DP and radial pulses equal.  Lungs: Clear to auscultation b/l  ABD: NTND, no guarding or rebound   MSK: Neck supple, nontender, nl ROM, no stepoff. Chest nontender. Back nontender in TLS spine or to b/l bony structures or flanks. Ext nontender, nl rom, no deformity.   NEURO: A&Ox3, normal strength   PSYCH: Denies SI/HI/hallucinations    10-15    144  |  110  |  11  ----------------------------<  79  4.3   |  22  |  0.9    Ca    9.7      15 Oct 2020 06:16    TPro  6.3  /  Alb  3.9  /  TBili  0.4  /  DBili  x   /  AST  27  /  ALT  19  /  AlkPhos  141<H>  10-14                            13.3   5.86  )-----------( 249      ( 15 Oct 2020 06:16 )             41.1       CAPILLARY BLOOD GLUCOSE  < from: CT Brain Stroke Protocol (10.14.20 @ 16:50) >  IMPRESSION:  No acute intracranial pathology. No evidence of midline shift, mass effect or intracranial hemorrhage.    Mild chronic microvascular-type changes.    These findings were discussed with Dr. Cordero at 10/14/2020 4:54 PM by Dr. Waller with read back confirmation.    < end of copied text >      POCT Blood Glucose.: 119 mg/dL (14 Oct 2020 16:57)    CARDIAC MARKERS ( 15 Oct 2020 12:37 )  x     / 0.02 ng/mL / 69 U/L / x     / 8.0 ng/mL  CARDIAC MARKERS ( 15 Oct 2020 06:00 )  x     / 0.05 ng/mL / x     / x     / x      CARDIAC MARKERS ( 14 Oct 2020 17:06 )  x     / 0.01 ng/mL / 64 U/L / x     / 6.4 ng/mL         Vascular consult   for:  20-39% stenosis of the right internal carotid artery.  > 80% stenosis of the left internal carotid artery.    working DX : Slurred Speech  - TIA vs stroke vs polypharmacy vs seizure    76 yo f h/o HTN, HLD, bipolar disorder, CHF, COPD on 4L NC at home noted to have slurred speech noted by . As per report EMS found pt without verbal response  Afib, she was cardioverted in the field and arrived to ER in NSR. On arrival pt awake and alert c/o generalized weakness, mild, no specific pain or radiation. Stroke code called, head CT without pathology. pt with slurred speech, no facial droop or unilateral weakness.    reports pt took tramadol and a muscle relaxer for pain.   Denies fever, chills, cp, sob,   abd pain, N/V/D dizziness, numbness, tingling.     quit tob 17 years ago , was a " heavy smoker " for 40 years   ambulates with a walker and is functional     Allergies    codeine (Other (Moderate))  Depakote (Unknown)  IV Contrast (Anaphylaxis)  losartan (Angioedema)  Risperdal (Other)  verapamil (Short breath; Angioedema)    Intolerances    PAST MEDICAL & SURGICAL HISTORY:  Falls    Congestive heart failure    Transient ischemic attack    FLAVIO on CPAP    Bipolar 1 disorder    Allergic reaction    PVD (peripheral vascular disease)    Other emphysema    Other cardiomyopathy    TIA (transient ischemic attack)    COPD (chronic obstructive pulmonary disease)    Depression    Hypertension    History of cholecystectomy        Review of Systems: Constitutional: (-) fever (-) chills   Eyes: (-) visual changes  ENMT: (-) nasal or chest congestion (-) runny nose (-) sore throat (-) neck pain (-) neck stiffness  Cardiac: (-) chest pain (-) syncope  Respiratory: (-) cough (-) SOB  GI: (-) nausea (-) vomiting (-) diarrhea  : (-) incontinence  MS:(-) back pain   Neuro: (-) head injury (-) headache (-) dizziness (-) numbness/tingling to extremities B/L (-) LOC   Skin: (-) abrasion (-) rash (-) laceration    Vital Signs Last 24 Hrs  T(C): 35.8 (15 Oct 2020 15:00), Max: 36.7 (15 Oct 2020 05:00)  T(F): 96.5 (15 Oct 2020 15:00), Max: 98 (15 Oct 2020 05:00)  HR: 81 (15 Oct 2020 07:00) (68 - 89)  BP: 168/72 (15 Oct 2020 07:00) (103/58 - 197/80)  BP(mean): 119 (15 Oct 2020 05:29) (77 - 121)  RR: 20 (15 Oct 2020 15:00) (17 - 22)  SpO2: 100% (15 Oct 2020 05:29) (98% - 100%)    Constitutional: NAD, appears stated age   HEENT: Airway patent, moist MM, EOMI, PERRLA.  CV: regular rate, regular rhythm, well-perfused extremities, 2+ b/l DP and radial pulses equal.  Lungs: Clear to auscultation b/l  ABD: NTND, no guarding or rebound   MSK: Neck supple, nontender, nl ROM, no stepoff. Chest nontender. Back nontender in TLS spine or to b/l bony structures or flanks. Ext nontender, nl rom, no deformity.   NEURO: A&Ox3, normal strength   PSYCH: Denies SI/HI/hallucinations    10-15    144  |  110  |  11  ----------------------------<  79  4.3   |  22  |  0.9    Ca    9.7      15 Oct 2020 06:16    TPro  6.3  /  Alb  3.9  /  TBili  0.4  /  DBili  x   /  AST  27  /  ALT  19  /  AlkPhos  141<H>  10-14                            13.3   5.86  )-----------( 249      ( 15 Oct 2020 06:16 )             41.1       CAPILLARY BLOOD GLUCOSE  < from: CT Brain Stroke Protocol (10.14.20 @ 16:50) >  IMPRESSION:  No acute intracranial pathology. No evidence of midline shift, mass effect or intracranial hemorrhage.    Mild chronic microvascular-type changes.    These findings were discussed with Dr. Cordero at 10/14/2020 4:54 PM by Dr. Waller with read back confirmation.    < end of copied text >      POCT Blood Glucose.: 119 mg/dL (14 Oct 2020 16:57)    CARDIAC MARKERS ( 15 Oct 2020 12:37 )  x     / 0.02 ng/mL / 69 U/L / x     / 8.0 ng/mL  CARDIAC MARKERS ( 15 Oct 2020 06:00 )  x     / 0.05 ng/mL / x     / x     / x      CARDIAC MARKERS ( 14 Oct 2020 17:06 )  x     / 0.01 ng/mL / 64 U/L / x     / 6.4 ng/mL

## 2020-10-15 NOTE — PHARMACOTHERAPY INTERVENTION NOTE - COMMENTS
Medrec performed:  [facility] pharmacy [phone]    Verified the following recent fill of these medications:  -Chlorzoxazone 500mg daily  -Escitalopram 10mg daily  -Duloxetine 30mg daily  -Amitriptyline 25mg daily  -Lamotrigine 100mg daily (Last fill 9/28/2020)  -Gabapentin 300mg TID (last fill was June)

## 2020-10-15 NOTE — CONSULT NOTE ADULT - ASSESSMENT
74 yo f h/o HTN, HLD, bipolar disorder, CHF, COPD on 4L NC at home noted to have slurred speech noted by .  Carotoid doppler reveals:   20-39% stenosis of the right internal carotid artery.  > 80% stenosis of the left internal carotid artery.         74 yo f h/o HTN, HLD, bipolar disorder, CHF, COPD on 4L NC at home noted to have slurred speech noted by .  Carotoid doppler reveals:   20-39% stenosis of the right internal carotid artery.  > 80% stenosis of the left internal carotid artery.      Plan:  MRI BRAIN  MRA HEAD AND NECK  c/w asa / statin / lovenox

## 2020-10-15 NOTE — SWALLOW BEDSIDE ASSESSMENT ADULT - SLP GENERAL OBSERVATIONS
in bed on the phone, speech clear, some slurring when SLP announced name , title , rationale for assessment

## 2020-10-15 NOTE — CONSULT NOTE ADULT - SUBJECTIVE AND OBJECTIVE BOX
Neurology Consultation note    Name  SHAUN COATES    HPI:  pt is a 74 yo f h/o HTN, HLD, bipolar disorder, CHF, COPD on 4L NC at home noted to have slurred speech noted by . As per report EMS found pt without verbal response Afib, she was cardioverted in the field and arrived to ER in NSR. On arrival pt awake and alert c/o generalized weakness, mild, no specific pain or radiation. Stroke code called, head CT without pathology. pt with slurred speech, no facail droop or unilateral weakness.    reports pt took tramadol and a muscle relaxer for pain.   Denies fever, chills, cp, sob,   abd pain, N/V/D dizziness, numbness, tingling.  (14 Oct 2020 22:38)    NEURO  74 yo female with hx as above adm with the following. hx is patchy and inconsistent. she states she has neuro in [place]  who she follows for ha and bilateral facial numbness at baseline. yesterday she woke with dysarthria. does not report aphasia. states she has full recollection of event which she denied on other exams. she also states both hands numb and that she has a ha. states she had a tia in the past which consisted of drooling. she is on tramadol for pain, was given flexeril as well but does not take it. takes ativan and states she has not skipped dosages  ? afib in the field and cardioverted  she endorses cp and sob  states she had possible COVID contact last week but no fevers or cough      Vital Signs Last 24 Hrs  T(C): 36.6 (15 Oct 2020 07:00), Max: 36.7 (15 Oct 2020 05:00)  T(F): 97.8 (15 Oct 2020 07:00), Max: 98 (15 Oct 2020 05:00)  HR: 81 (15 Oct 2020 07:00) (68 - 89)  BP: 168/72 (15 Oct 2020 07:00) (103/58 - 197/80)  BP(mean): 119 (15 Oct 2020 05:29) (77 - 121)  RR: 22 (15 Oct 2020 07:00) (17 - 22)  SpO2: 100% (15 Oct 2020 05:29) (98% - 100%)    Neurological Exam:   Mental status: Awake, alert and oriented x3.  Recent and remote memory intact.  Naming, repetition and comprehension intact.  Attention/concentration intact.  ,ild dysarthria, no aphasia.  Fund of knowledge appropriate.    Cranial nerves: Pupils equally round and reactive to light, visual fields full, no nystagmus, extraocular muscles intact, V1 through V3 intact bilaterally and symmetric, face symmetric, hearing intact to finger rub, palate elevation symmetric, tongue was midline.  Motor:  MRC grading 5/5 b/l UE/LE.   strength 5/5.  Normal tone and bulk.  No abnormal movements.    Sensation: decreased lt both hands  Coordination: No dysmetria on finger-to-nose and heel-to-shin.  No dysdiadokinesia.  Reflexes: 2+ in bilateral UE/LE, downgoing toes bilaterally. (-) Angella.    Medications  aspirin  chewable 81 milliGRAM(s) Oral daily  atorvastatin 80 milliGRAM(s) Oral at bedtime  budesonide 160 MICROgram(s)/formoterol 4.5 MICROgram(s) Inhaler 2 Puff(s) Inhalation two times a day  chlorhexidine 4% Liquid 1 Application(s) Topical <User Schedule>  enoxaparin Injectable 40 milliGRAM(s) SubCutaneous daily  furosemide    Tablet 40 milliGRAM(s) Oral daily  gabapentin 300 milliGRAM(s) Oral three times a day  guaifenesin/dextromethorphan  Syrup 10 milliLiter(s) Oral every 4 hours PRN  lamoTRIgine 25 milliGRAM(s) Oral two times a day  metoprolol tartrate 100 milliGRAM(s) Oral two times a day  montelukast 10 milliGRAM(s) Oral daily  pantoprazole    Tablet 40 milliGRAM(s) Oral before breakfast      Lab  10-15    144  |  110  |  11  ----------------------------<  79  4.3   |  22  |  0.9    Ca    9.7      15 Oct 2020 06:16    TPro  6.3  /  Alb  3.9  /  TBili  0.4  /  DBili  x   /  AST  27  /  ALT  19  /  AlkPhos  141<H>  10-14                          13.3   5.86  )-----------( 249      ( 15 Oct 2020 06:16 )             41.1     LIVER FUNCTIONS - ( 14 Oct 2020 17:06 )  Alb: 3.9 g/dL / Pro: 6.3 g/dL / ALK PHOS: 141 U/L / ALT: 19 U/L / AST: 27 U/L / GGT: x                   Radiology      Assessment:  74 yo female with complaints of B facial and hand numbness and dysarthria upon waking yesterday.  NIHSS 1. no tpa bc she was out of window. mrs 0  her complaints are patchy and do not localize to any vascular territory   Plan:  MRI BRAIN  MRA HEAD AND NECK  REEG BC SHE DID GIVE HX OF LOSS OF RECOLLECTION OF EVENTS TO DIFFERENT EXAMINER   2D ECHO  CHECKS LIPIDS AND HbA1C  asa and statin for now  speech eval

## 2020-10-15 NOTE — PROGRESS NOTE ADULT - ASSESSMENT
SHAUN COATES 75y Female  MRN#: [id number]   CODE STATUS:full code       SUBJECTIVE  Patient is a 75y old Female who presents with a chief complaint of slurred speech (15 Oct 2020 12:35)  Currently admitted to medicine with the primary diagnosis of Slurred speech  Today is hospital day 1d, and this morning she is resting in bed and reports no overnight events.       OBJECTIVE  PAST MEDICAL & SURGICAL HISTORY  Falls    Congestive heart failure    Transient ischemic attack    FLAVIO on CPAP    Bipolar 1 disorder    Allergic reaction    PVD (peripheral vascular disease)    Other emphysema    Other cardiomyopathy    TIA (transient ischemic attack)    COPD (chronic obstructive pulmonary disease)    Depression    Hypertension    History of cholecystectomy      ALLERGIES:  codeine (Other (Moderate))  Depakote (Unknown)  IV Contrast (Anaphylaxis)  losartan (Angioedema)  Risperdal (Other)  verapamil (Short breath; Angioedema)    MEDICATIONS:  STANDING MEDICATIONS  aspirin  chewable 81 milliGRAM(s) Oral daily  atorvastatin 80 milliGRAM(s) Oral at bedtime  budesonide 160 MICROgram(s)/formoterol 4.5 MICROgram(s) Inhaler 2 Puff(s) Inhalation two times a day  chlorhexidine 4% Liquid 1 Application(s) Topical <User Schedule>  DULoxetine 30 milliGRAM(s) Oral daily  enoxaparin Injectable 40 milliGRAM(s) SubCutaneous daily  furosemide    Tablet 40 milliGRAM(s) Oral daily  gabapentin 300 milliGRAM(s) Oral three times a day  lamoTRIgine 25 milliGRAM(s) Oral two times a day  LORazepam     Tablet 0.5 milliGRAM(s) Oral two times a day  metoprolol tartrate 100 milliGRAM(s) Oral two times a day  montelukast 10 milliGRAM(s) Oral daily  pantoprazole    Tablet 40 milliGRAM(s) Oral before breakfast    PRN MEDICATIONS  guaifenesin/dextromethorphan  Syrup 10 milliLiter(s) Oral every 4 hours PRN      VITAL SIGNS: Last 24 Hours  T(C): 36.6 (15 Oct 2020 07:00), Max: 36.7 (15 Oct 2020 05:00)  T(F): 97.8 (15 Oct 2020 07:00), Max: 98 (15 Oct 2020 05:00)  HR: 81 (15 Oct 2020 07:00) (68 - 89)  BP: 168/72 (15 Oct 2020 07:00) (103/58 - 197/80)  BP(mean): 119 (15 Oct 2020 05:29) (77 - 121)  RR: 22 (15 Oct 2020 07:00) (17 - 22)  SpO2: 100% (15 Oct 2020 05:29) (98% - 100%)    LABS:                        13.3   5.86  )-----------( 249      ( 15 Oct 2020 06:16 )             41.1     10-15    144  |  110  |  11  ----------------------------<  79  4.3   |  22  |  0.9    Ca    9.7      15 Oct 2020 06:16    TPro  6.3  /  Alb  3.9  /  TBili  0.4  /  DBili  x   /  AST  27  /  ALT  19  /  AlkPhos  141<H>  10-14    PT/INR - ( 14 Oct 2020 17:06 )   PT: 13.70 sec;   INR: 1.19 ratio         PTT - ( 14 Oct 2020 17:06 )  PTT:33.0 sec      Troponin T, Serum: 0.05 ng/mL <HH> (10-15-20 @ 06:00)  Creatine Kinase, Serum: 64 U/L (10-14-20 @ 17:06)  Troponin T, Serum: 0.01 ng/mL (10-14-20 @ 17:06)  Lactate, Blood: 2.1 mmol/L <H> (10-14-20 @ 17:06)      CARDIAC MARKERS ( 15 Oct 2020 06:00 )  x     / 0.05 ng/mL / x     / x     / x      CARDIAC MARKERS ( 14 Oct 2020 17:06 )  x     / 0.01 ng/mL / 64 U/L / x     / 6.4 ng/mL      RADIOLOGY:      PHYSICAL EXAM:  Constitutional: NAD, appears stated age   HEENT: Airway patent, moist MM, no erythema/swelling/deformity of oral structures. EOMI, PERRLA.  CV: regular rate, regular rhythm, well-perfused extremities, 2+ b/l DP and radial pulses equal.  Lungs: Clear to auscultation anteriorally , no increased WOB.  ABD: NTND, no guarding or rebound, no pulsatile mass, no hernias.   MSK: Neck supple, nontender, nl ROM, no stepoff. Chest nontender. Back nontender in TLS spine or to b/l bony structures or flanks. Ext nontender, nl rom, no deformity.   INTEG: Skin warm, dry, no rash.  NEURO: A&Ox3, normal strength, endorses change in sensation of b/l feet, normal speech.   PSYCH: Denies SI/HI/hallucinations    ASSESSMENT & PLAN    76 yo f h/o HTN, HLD, bipolar disorder, CHF, COPD on home O2 p/w slurred speech and s/p episode of afib converted in the field now in NSR.     #AMS likley secondary to polypharmacy, r/o stroke or TIA   c/f polypharmacy since  states pt took tramadol and muscle relaxer   sees multiple providers ( pain management, psychiatry....etc   patient is on multiple pain killers benzo and SSRIs  head CT negative  no speech defecit or focal neurological defecits at this time,? dec sensation in feet   Plan:  Neuro on consult: MRI/MRA , rEEG,  lipids, A1C, continue ASA/statin, speech eval, 2D echo   will limit cns depressants     #afib s/p cardioversion in the field  #Troponin elevation   will try to obtain EMS strips   Cardiology on consult : r/o MI and check d dimer, AC if ok with neuro   c/w metoprolol     #copd on home 02   O2 prn, bipap prn   continue singular     # HTN   continue lopressor, Norvasc     #CHF?  continue Lasix     #Bipolar   continue Lamictal     # Dvt ppx   # gi ppx   # OOB   # Full code

## 2020-10-15 NOTE — PROGRESS NOTE ADULT - SUBJECTIVE AND OBJECTIVE BOX
Patient is a 75y old  Female who presents with a chief complaint of slurred speech (15 Oct 2020 07:56)        HPI:  pt is a 76 yo f h/o HTN, HLD, bipolar disorder, CHF, COPD on 4L NC at home noted to have slurred speech noted by . As per report EMS found pt without verbal response Afib, she was cardioverted in the field and arrived to ER in NSR. On arrival pt awake and alert c/o generalized weakness, mild, no specific pain or radiation. Stroke code called, head CT without pathology. pt with slurred speech, no facail droop or unilateral weakness.    reports pt took tramadol and a muscle relaxer for pain.   Denies fever, chills, cp, sob,   abd pain, N/V/D dizziness, numbness, tingling.  (14 Oct 2020 22:38)      Interval Events: No overnight events.    REVIEW OF SYSTEMS:   see HPI      OBJECTIVE:  ICU Vital Signs Last 24 Hrs  T(C): 36.6 (15 Oct 2020 07:00), Max: 36.7 (15 Oct 2020 05:00)  T(F): 97.8 (15 Oct 2020 07:00), Max: 98 (15 Oct 2020 05:00)  HR: 81 (15 Oct 2020 07:00) (68 - 89)  BP: 168/72 (15 Oct 2020 07:00) (103/58 - 197/80)  BP(mean): 119 (15 Oct 2020 05:29) (77 - 121)  ABP: --  ABP(mean): --  RR: 22 (15 Oct 2020 07:00) (17 - 22)  SpO2: 100% (15 Oct 2020 05:29) (98% - 100%)        10-14 @ 07:01  -  10-15 @ 07:00  --------------------------------------------------------  IN: 200 mL / OUT: 400 mL / NET: -200 mL      CAPILLARY BLOOD GLUCOSE      POCT Blood Glucose.: 119 mg/dL (14 Oct 2020 16:57)        PHYSICAL EXAM:     · CONSTITUTIONAL:   not septic appearing,   well nourished,   NAD    · ENMT:   Airway patent,   Nasal mucosa clear.  Mouth with normal mucosa.   No thrush    · EYES:   Clear bilaterally,   pupils equal,   round and reactive to light.    · CARDIAC:   Normal rate,   regular rhythm.    Heart sounds S1, S2.   No murmurs, no rubs or gallops on auscultation  no edema        CAROTID:   normal systolic impulse  no bruits    · RESPIRATORY:   no w/r/r/,   normal chest expansion  no tachypnea,  no retractions or use of accessory muscles  palpation of chest is normal with no fremitus  percussion of chest demonstrates no hyperresonance or dullness    · GASTROINTESTINAL:  Abdomen soft,   non-tender,   + BS  liver/spleen not palpable    · MUSCULOSKELETAL:   no clubbing, cyanosis      · NEUROLOGICAL:   Alert and oriented   no obvious focal deficits in cranial nerve areas  no motor or sensory deficits.      · SKIN:   Skin normal color for race,   warm, dry   No evidence of rash.    · PSYCHIATRIC:   Alert and oriented to person,   place, time/situation.       · HEME LYMPH:   no splenomegaly.  No cervical  lymphadenopathy.  no inguinal lymphadenopathy    HOSPITAL MEDICATIONS:  MEDICATIONS  (STANDING):  aspirin  chewable 81 milliGRAM(s) Oral daily  atorvastatin 80 milliGRAM(s) Oral at bedtime  budesonide 160 MICROgram(s)/formoterol 4.5 MICROgram(s) Inhaler 2 Puff(s) Inhalation two times a day  chlorhexidine 4% Liquid 1 Application(s) Topical <User Schedule>  enoxaparin Injectable 40 milliGRAM(s) SubCutaneous daily  furosemide    Tablet 40 milliGRAM(s) Oral daily  gabapentin 300 milliGRAM(s) Oral three times a day  lamoTRIgine 25 milliGRAM(s) Oral two times a day  metoprolol tartrate 100 milliGRAM(s) Oral two times a day  montelukast 10 milliGRAM(s) Oral daily  pantoprazole    Tablet 40 milliGRAM(s) Oral before breakfast    MEDICATIONS  (PRN):  guaifenesin/dextromethorphan  Syrup 10 milliLiter(s) Oral every 4 hours PRN congestion        LABS:                        13.3   5.86  )-----------( 249      ( 15 Oct 2020 06:16 )             41.1     10-15    144  |  110  |  11  ----------------------------<  79  4.3   |  22  |  0.9    Ca    9.7      15 Oct 2020 06:16    TPro  6.3  /  Alb  3.9  /  TBili  0.4  /  DBili  x   /  AST  27  /  ALT  19  /  AlkPhos  141<H>  10-14    PT/INR - ( 14 Oct 2020 17:06 )   PT: 13.70 sec;   INR: 1.19 ratio         PTT - ( 14 Oct 2020 17:06 )  PTT:33.0 sec        CARDIAC MARKERS ( 15 Oct 2020 06:00 )  x     / 0.05 ng/mL / x     / x     / x      CARDIAC MARKERS ( 14 Oct 2020 17:06 )  x     / 0.01 ng/mL / 64 U/L / x     / 6.4 ng/mL                RADIOLOGY: I personally reviewed latest CXR and other pertinent films.           Patient is a 75y old  Female who presents with a chief complaint of slurred speech (15 Oct 2020 10:36)    HPI:  pt is a 74 yo f h/o HTN, HLD, bipolar disorder, CHF, COPD on 4L NC at home noted to have slurred speech noted by . As per report EMS found pt without verbal response Afib, she was cardioverted in the field and arrived to ER in NSR. On arrival pt awake and alert c/o generalized weakness, mild, no specific pain or radiation. Stroke code called, head CT without pathology. pt with slurred speech, no facail droop or unilateral weakness.    reports pt took tramadol and a muscle relaxer for pain.   Denies fever, chills, cp, sob,   abd pain, N/V/D dizziness, numbness, tingling.  (14 Oct 2020 22:38)    PAST MEDICAL & SURGICAL HISTORY:  Congestive heart failure  Transient ischemic attack  FLAVIO on CPAP  Bipolar 1 disorder  Allergic reaction  PVD (peripheral vascular disease)  Other emphysema  Other cardiomyopathy  TIA (transient ischemic attack)  COPD (chronic obstructive pulmonary disease)  Depression  Hypertension  Falls  History of cholecystectomy    SOCIAL HX:   Former smoker                        ETOH                            Other    FAMILY HISTORY:  No pertinent family history in first degree relatives  No known cardiovascular family history     Review Of Systems:   All ROS are negative except per HPI     Allergies  codeine (Other (Moderate))  Depakote (Unknown)  IV Contrast (Anaphylaxis)  losartan (Angioedema)  Risperdal (Other)  verapamil (Short breath; Angioedema)    Intolerances      PHYSICAL EXAM  ICU Vital Signs Last 24 Hrs  T(C): 36.6 (15 Oct 2020 07:00), Max: 36.7 (15 Oct 2020 05:00)  T(F): 97.8 (15 Oct 2020 07:00), Max: 98 (15 Oct 2020 05:00)  HR: 81 (15 Oct 2020 07:00) (68 - 89)  BP: 168/72 (15 Oct 2020 07:00) (103/58 - 197/80)  BP(mean): 119 (15 Oct 2020 05:29) (77 - 121)  RR: 22 (15 Oct 2020 07:00) (17 - 22)  SpO2: 100% (15 Oct 2020 05:29) (98% - 100%)       CONSTITUTIONAL:   not septic appearing,   well nourished,   NAD    · ENMT:   Airway patent,   Nasal mucosa clear.  Mouth with normal mucosa.   No thrush    · EYES:   Clear bilaterally,   pupils equal,   round and reactive to light.    · CARDIAC:   Normal rate,   Irregular rhythm.    Heart sounds S1, S2.   No murmurs, no rubs or gallops on auscultation  no edema      CAROTID:   normal systolic impulse  no bruits    · RESPIRATORY:   no w/r/r/,   normal chest expansion  no tachypnea,  no retractions or use of accessory muscles  palpation of chest is normal with no fremitus  percussion of chest demonstrates no hyperresonance or dullness    · GASTROINTESTINAL:  Abdomen soft,   non-tender,   + BS  liver/spleen not palpable    · MUSCULOSKELETAL:   no clubbing, cyanosis    · NEUROLOGICAL:   Alert and oriented   no obvious focal deficits in cranial nerve areas  no motor or sensory deficits.      · SKIN:   Skin normal color for race,   warm, dry   No evidence of rash.    · PSYCHIATRIC:   Alert and oriented to person,   place, time/situation.      10-14-20 @ 07:01  -  10-15-20 @ 07:00  --------------------------------------------------------  IN:    IV PiggyBack: 200 mL  Total IN: 200 mL    OUT:    Indwelling Catheter - Urethral (mL): 400 mL  Total OUT: 400 mL    Total NET: -200 mL      LABS:                          13.3   5.86  )-----------( 249      ( 15 Oct 2020 06:16 )             41.1     144  |  110  |  11  ----------------------------<  79  4.3   |  22  |  0.9    Ca    9.7      15 Oct 2020 06:16  TPro  6.3  /  Alb  3.9  /  TBili  0.4  /  DBili  x   /  AST  27  /  ALT  19  /  AlkPhos  141<H>  10-14  PT/INR - ( 14 Oct 2020 17:06 )   PT: 13.70 sec;   INR: 1.19 ratio    PTT - ( 14 Oct 2020 17:06 )  PTT:33.0 sec                                           CARDIAC MARKERS ( 15 Oct 2020 06:00 )  x     / 0.05 ng/mL / x     / x     / x      CARDIAC MARKERS ( 14 Oct 2020 17:06 )  x     / 0.01 ng/mL / 64 U/L / x     / 6.4 ng/mL    LIVER FUNCTIONS - ( 14 Oct 2020 17:06 )  Alb: 3.9 g/dL / Pro: 6.3 g/dL / ALK PHOS: 141 U/L / ALT: 19 U/L / AST: 27 U/L / GGT: x                                                X-Rays reviewed  CXR interpreted by me: No acute cardiopulmonary findings    MEDICATIONS  (STANDING):  aspirin  chewable 81 milliGRAM(s) Oral daily  atorvastatin 80 milliGRAM(s) Oral at bedtime  budesonide 160 MICROgram(s)/formoterol 4.5 MICROgram(s) Inhaler 2 Puff(s) Inhalation two times a day  chlorhexidine 4% Liquid 1 Application(s) Topical <User Schedule>  DULoxetine 30 milliGRAM(s) Oral daily  enoxaparin Injectable 40 milliGRAM(s) SubCutaneous daily  furosemide    Tablet 40 milliGRAM(s) Oral daily  gabapentin 300 milliGRAM(s) Oral three times a day  lamoTRIgine 25 milliGRAM(s) Oral two times a day  metoprolol tartrate 100 milliGRAM(s) Oral two times a day  montelukast 10 milliGRAM(s) Oral daily  pantoprazole    Tablet 40 milliGRAM(s) Oral before breakfast    MEDICATIONS  (PRN):  guaifenesin/dextromethorphan  Syrup 10 milliLiter(s) Oral every 4 hours PRN congestion

## 2020-10-15 NOTE — CONSULT NOTE ADULT - SUBJECTIVE AND OBJECTIVE BOX
CARDIOLOGY CONSULT NOTE     CHIEF COMPLAINT/REASON FOR CONSULT:    HPI:  pt is a 74 yo f h/o HTN, HLD, bipolar disorder, CHF, COPD on 4L NC at home noted to have slurred speech noted by . As per report EMS found pt without verbal response Afib, she was cardioverted in the field and arrived to ER in NSR. On arrival pt awake and alert c/o generalized weakness, mild, no specific pain or radiation. Stroke code called, head CT without pathology. pt with slurred speech, no facail droop or unilateral weakness.    reports pt took tramadol and a muscle relaxer for pain.   Denies fever, chills, cp, sob,   abd pain, N/V/D dizziness, numbness, tingling.  (14 Oct 2020 22:38)      PAST MEDICAL & SURGICAL HISTORY:  Falls    Congestive heart failure    Transient ischemic attack    FLAVIO on CPAP    Bipolar 1 disorder    Allergic reaction    PVD (peripheral vascular disease)    Other emphysema    Other cardiomyopathy    TIA (transient ischemic attack)    COPD (chronic obstructive pulmonary disease)    Depression    Hypertension    History of cholecystectomy        Cardiac Risks:   [x ]HTN, [ ] DM, [ ] Smoking, [ x] FH,  [x ] Lipids        MEDICATIONS:  MEDICATIONS  (STANDING):  aspirin  chewable 81 milliGRAM(s) Oral daily  atorvastatin 80 milliGRAM(s) Oral at bedtime  budesonide 160 MICROgram(s)/formoterol 4.5 MICROgram(s) Inhaler 2 Puff(s) Inhalation two times a day  chlorhexidine 4% Liquid 1 Application(s) Topical <User Schedule>  enoxaparin Injectable 40 milliGRAM(s) SubCutaneous daily  furosemide    Tablet 40 milliGRAM(s) Oral daily  gabapentin 300 milliGRAM(s) Oral three times a day  lamoTRIgine 25 milliGRAM(s) Oral two times a day  metoprolol tartrate 100 milliGRAM(s) Oral two times a day  montelukast 10 milliGRAM(s) Oral daily  pantoprazole    Tablet 40 milliGRAM(s) Oral before breakfast      FAMILY HISTORY:  No pertinent family history in first degree relatives        SOCIAL HISTORY:      [ ] Marital status    Allergies    codeine (Other (Moderate))  Depakote (Unknown)  IV Contrast (Anaphylaxis)  losartan (Angioedema)  Risperdal (Other)  verapamil (Short breath; Angioedema)      	    REVIEW OF SYSTEMS:  CONSTITUTIONAL: No fever, weight loss, or fatigue  EYES: No eye pain, visual disturbances, or discharge  ENMT:  No difficulty hearing, tinnitus, vertigo; No sinus or throat pain  NECK: No pain or stiffness  RESPIRATORY: No cough, wheezing, chills or hemoptysis; No Shortness of Breath  CARDIOVASCULAR: See above  GASTROINTESTINAL: No abdominal or epigastric pain. No nausea, vomiting, or hematemesis; No diarrhea or constipation. No melena or hematochezia.  GENITOURINARY: No dysuria, frequency, hematuria, or incontinence  NEUROLOGICAL: No headaches, memory loss, loss of strength, numbness, or tremors  SKIN: No itching, burning, rashes, or lesions   	        PHYSICAL EXAM:  T(C): 36.6 (10-15-20 @ 07:00), Max: 36.7 (10-15-20 @ 05:00)  HR: 81 (10-15-20 @ 07:00) (68 - 89)  BP: 168/72 (10-15-20 @ 07:00) (103/58 - 197/80)  RR: 22 (10-15-20 @ 07:00) (17 - 22)  SpO2: 100% (10-15-20 @ 05:29) (98% - 100%)  Wt(kg): --  I&O's Summary    14 Oct 2020 07:01  -  15 Oct 2020 07:00  --------------------------------------------------------  IN: 200 mL / OUT: 400 mL / NET: -200 mL        Appearance: Normal	  Psychiatry: A & O x 3, Mood & affect appropriate  HEENT:   Normal oral mucosa, PERRL, EOMI	  Lymphatic: No lymphadenopathy  Cardiovascular: Normal S1 S2,RRR, No JVD, i/vi brennan lsb  Respiratory: Lungs clear to auscultation	  Gastrointestinal:  Soft, Non-tender, + BS	  Skin: No rashes, No ecchymoses, No cyanosis	  Neurologic: Non-focal  Extremities: Normal range of motion, No clubbing, cyanosis or edema  Vascular: Peripheral pulses palpable 2+ bilaterally      ECG:  	nsr lae ns st     	  LABS:	 	    CARDIAC MARKERS:                                    13.3   5.86  )-----------( 249      ( 15 Oct 2020 06:16 )             41.1     10-15    144  |  110  |  11  ----------------------------<  79  4.3   |  22  |  0.9    Ca    9.7      15 Oct 2020 06:16    TPro  6.3  /  Alb  3.9  /  TBili  0.4  /  DBili  x   /  AST  27  /  ALT  19  /  AlkPhos  141<H>  10-14    PT/INR - ( 14 Oct 2020 17:06 )   PT: 13.70 sec;   INR: 1.19 ratio         PTT - ( 14 Oct 2020 17:06 )  PTT:33.0 sec

## 2020-10-15 NOTE — PROGRESS NOTE ADULT - ASSESSMENT
IMPRESSION:  Slurry speech, r/o CVA/TIA  r/o Seizure  AFib with RVR s/p electric cardioversion by EMS      PLAN:    CNS:  MRI, MRA H&N.  REEG.  ASA & Statin.  Neurology following.    HEENT: Oral care    PULMONARY:  HOB @ 45 degrees.  Aspiration precautions.    CARDIOVASCULAR:   Rate controlled.  ECHO.  CE x 2.  Maximize cardiac therapy and volume status.  Cardio following.    GI: GI prophylaxis.  Feeding per S&S.  Bowel regimen     RENAL:  Follow up lytes.  Correct as needed    INFECTIOUS DISEASE: Follow up cultures    HEMATOLOGICAL:  Will need AC once CVA r/o, FU with Neurology.  DVT prophylaxis.    ENDOCRINE:  Follow up FS.  Insulin protocol if needed.  HbA1c, Lipid profile, TSH.    MUSCULOSKELETAL:  OOB to chair, fall precautions, PT/OT/PMR    Advanced directives and goals of care  Telemetry monitoring

## 2020-10-15 NOTE — PROGRESS NOTE ADULT - SUBJECTIVE AND OBJECTIVE BOX
SHAUN COATES  75y, Female  Allergy: codeine (Other (Moderate))  Depakote (Unknown)  IV Contrast (Anaphylaxis)  losartan (Angioedema)  Risperdal (Other)  verapamil (Short breath; Angioedema)    Hospital Day: 1d    Patient seen and examined earlier today. Patient endorsing improvement in her speech this morning, but does endorse change in sensation of both of her feet. Planning for an MRI today.     PMH/PSH:  PAST MEDICAL & SURGICAL HISTORY:  Falls    Congestive heart failure    Transient ischemic attack    FLAVIO on CPAP    Bipolar 1 disorder    Allergic reaction    PVD (peripheral vascular disease)    Other emphysema    Other cardiomyopathy    TIA (transient ischemic attack)    COPD (chronic obstructive pulmonary disease)    Depression    Hypertension    History of cholecystectomy        LAST 24-Hr EVENTS:    VITALS:  T(F): 97.8 (10-15-20 @ 07:00), Max: 98 (10-15-20 @ 05:00)  HR: 81 (10-15-20 @ 07:00)  BP: 168/72 (10-15-20 @ 07:00) (103/58 - 197/80)  RR: 22 (10-15-20 @ 07:00)  SpO2: 100% (10-15-20 @ 05:29)        TESTS & MEASUREMENTS:  Weight (Kg): 88.4 (10-14-20 @ 22:20)  BMI (kg/m2): 35.6 (10-14)    10-14-20 @ 07:01  -  10-15-20 @ 07:00  --------------------------------------------------------  IN: 200 mL / OUT: 400 mL / NET: -200 mL                            13.3   5.86  )-----------( 249      ( 15 Oct 2020 06:16 )             41.1     PT/INR - ( 14 Oct 2020 17:06 )   PT: 13.70 sec;   INR: 1.19 ratio         PTT - ( 14 Oct 2020 17:06 )  PTT:33.0 sec  10-15    144  |  110  |  11  ----------------------------<  79  4.3   |  22  |  0.9    Ca    9.7      15 Oct 2020 06:16    TPro  6.3  /  Alb  3.9  /  TBili  0.4  /  DBili  x   /  AST  27  /  ALT  19  /  AlkPhos  141<H>  10-14    LIVER FUNCTIONS - ( 14 Oct 2020 17:06 )  Alb: 3.9 g/dL / Pro: 6.3 g/dL / ALK PHOS: 141 U/L / ALT: 19 U/L / AST: 27 U/L / GGT: x           CARDIAC MARKERS ( 15 Oct 2020 06:00 )  x     / 0.05 ng/mL / x     / x     / x      CARDIAC MARKERS ( 14 Oct 2020 17:06 )  x     / 0.01 ng/mL / 64 U/L / x     / 6.4 ng/mL                      RADIOLOGY, ECG, & ADDITIONAL TESTS:  12 Lead ECG:   Ventricular Rate 63 BPM    Atrial Rate 63 BPM    P-R Interval 142 ms    QRS Duration 88 ms    Q-T Interval 446 ms    QTC Calculation(Bazett) 456 ms    P Axis 41 degrees    R Axis 27 degrees    T Axis 49 degrees    Diagnosis Line Normal sinus rhythm  Possible Left atrial enlargement  Nonspecific T wave abnormality  Abnormal ECG    Confirmed by VIVIENNE HAQUE MD ([id number]) on 10/15/2020 11:00:52 AM (10-15-20 @ 10:12)  12 Lead ECG:   Ventricular Rate 65 BPM    Atrial Rate 65 BPM    P-R Interval 144 ms    QRS Duration 88 ms    Q-T Interval 400 ms    QTC Calculation(Bazett) 416 ms    P Axis 47 degrees    R Axis 29 degrees    T Axis 64 degrees    Diagnosis Line Normal sinus rhythm  Possible Left atrial enlargement  Nonspecific T wave abnormality  Abnormal ECG    Confirmed by IVVIENNE HAQUE MD ([id number]) on 10/15/2020 11:00:48 AM (10-15-20 @ 10:11)  12 Lead ECG:   Ventricular Rate 91 BPM    Atrial Rate 91 BPM    P-R Interval 150 ms    QRS Duration 94 ms    Q-T Interval 406 ms    QTC Calculation(Bazett) 499 ms    P Axis 63 degrees    R Axis 64 degrees    T Axis 51 degrees    Diagnosis Line Normal sinus rhythm  Biatrial enlargement  Nonspecific ST and T wave abnormality    Confirmed by VIVIENNE HAQUE MD ([id number]) on 10/15/2020 11:00:43 AM (10-14-20 @ 16:57)  12 Lead ECG:   Ventricular Rate 97 BPM    Atrial Rate 97 BPM    P-R Interval 160 ms    QRS Duration 92 ms    Q-T Interval 366 ms    QTC Calculation(Bazett) 464 ms    P Axis 61 degrees    R Axis 67 degrees    T Axis 55 degrees    Diagnosis Line Normal sinus rhythm  Possible Left atrial enlargement  Nonspecific ST and T wave abnormality  Abnormal ECG    Confirmed by VIVIENNE HAQUE MD ([id number]) on 10/15/2020 11:00:24 AM (10-14-20 @ 16:41)      RECENT DIAGNOSTIC ORDERS:  EEG:   Transport: Portable,  w/ Monitor  Hemorrhage:No  Brain Death: No  MI:No  Sickle Cell:No   Stimulants:No  Anti-Convulsants:Yes   Anti-Depressants:Yes  Contr Substances:No (10-15-20 @ 11:28)  Comprehensive Metabolic Panel: AM Sched. Collection: 16-Oct-2020 04:30 (10-15-20 @ 11:28)  Complete Blood Count + Automated Diff: AM Sched. Collection: 16-Oct-2020 04:30 (10-15-20 @ 11:28)  A1C with Estimated Average Glucose: AM Sched. Collection: 16-Oct-2020 04:30 (10-15-20 @ 11:28)  Lipid Profile: AM Sched. Collection: 16-Oct-2020 04:30 (10-15-20 @ 11:28)  MR Orbit, Face, and/or Neck No Cont: Routine   Indication: weakness  Transport: Stretcher-Crib,  w/ Monitor (10-15-20 @ 11:28)  MR Head No Cont: Routine   Indication: r/o stroke  Transport: Stretcher-Crib,  w/ Monitor (10-15-20 @ 11:28)  D-Dimer Assay, Quantitative:  Start Date:15-Oct-2020. 11:00 (10-15-20 @ 10:40)  CKMB Units: 11:00 (10-15-20 @ 09:06)  D-Dimer Assay, Quantitative:  Start Date:15-Oct-2020. 11:00 (10-15-20 @ 09:05)  Troponin T, Serum: 11:00 (10-15-20 @ 08:05)  CKMB Units: 11:00 (10-15-20 @ 08:05)  Creatine Kinase, Serum: 11:00 (10-15-20 @ 08:05)  D-Dimer Assay, Quantitative:  Start Date:15-Oct-2020. 11:00 (10-15-20 @ 08:05)  12 Lead ECG: Repeat From: 15-Oct-2020 08:03 To: 17-Oct-2020 00:00, Every 1 day(s)  Provider's Contact #: [phone] (10-15-20 @ 08:05)  VA Duplex Carotid, Bilat: Routine   Indication: r/o cva  Transport: Stretcher-Crib,  w/ Monitor  Exam Completed  Provider's Contact #: [phone] (10-15-20 @ 06:06)  Transthoracic Echocardiogram:   Transport: Stretcher-Crib  Monitor: w/ Monitor  Provider's Contact #: [phone] (10-15-20 @ 06:05)  Thyroid Stimulating Hormone, Serum: Routine (10-15-20 @ 06:04)  COVID-19  Antibody - for prior infection screening: Routine (10-14-20 @ 23:16)  12 Lead ECG:   Provider's Contact #: [phone] (10-14-20 @ 22:39)  Diet, DASH/TLC:   Sodium & Cholesterol Restricted (10-14-20 @ 21:57)      MEDICATIONS:  MEDICATIONS  (STANDING):  aspirin  chewable 81 milliGRAM(s) Oral daily  atorvastatin 80 milliGRAM(s) Oral at bedtime  budesonide 160 MICROgram(s)/formoterol 4.5 MICROgram(s) Inhaler 2 Puff(s) Inhalation two times a day  chlorhexidine 4% Liquid 1 Application(s) Topical <User Schedule>  DULoxetine 30 milliGRAM(s) Oral daily  enoxaparin Injectable 40 milliGRAM(s) SubCutaneous daily  furosemide    Tablet 40 milliGRAM(s) Oral daily  gabapentin 300 milliGRAM(s) Oral three times a day  lamoTRIgine 25 milliGRAM(s) Oral two times a day  metoprolol tartrate 100 milliGRAM(s) Oral two times a day  montelukast 10 milliGRAM(s) Oral daily  pantoprazole    Tablet 40 milliGRAM(s) Oral before breakfast    MEDICATIONS  (PRN):  guaifenesin/dextromethorphan  Syrup 10 milliLiter(s) Oral every 4 hours PRN congestion      HOME MEDICATIONS:  amLODIPine 2.5 mg oral tablet (02-08)  aspirin 81 mg oral tablet (02-08)  atorvastatin 20 mg oral tablet (02-08)  budesonide-formoterol 160 mcg-4.5 mcg/inh inhalation aerosol (02-08)  furosemide 20 mg oral tablet (02-08)  lamoTRIgine 25 mg oral tablet, chewable dispersible (02-08)  Metoprolol Tartrate 100 mg oral tablet (02-08)  montelukast 10 mg oral tablet (02-08)  Pepcid 40 mg oral tablet (02-08)  QUEtiapine 100 mg oral tablet (02-08)      PHYSICAL EXAM:  Constitutional: NAD, appears stated age   HEENT: Airway patent, moist MM, no erythema/swelling/deformity of oral structures. EOMI, PERRLA.  CV: regular rate, regular rhythm, well-perfused extremities, 2+ b/l DP and radial pulses equal.  Lungs: Clear to auscultation anteriorally , no increased WOB.  ABD: NTND, no guarding or rebound, no pulsatile mass, no hernias.   MSK: Neck supple, nontender, nl ROM, no stepoff. Chest nontender. Back nontender in TLS spine or to b/l bony structures or flanks. Ext nontender, nl rom, no deformity.   INTEG: Skin warm, dry, no rash.  NEURO: A&Ox3, normal strength, endorses change in sensation of b/l feet, normal speech.   PSYCH: Denies SI/HI/hallucinations

## 2020-10-16 LAB
A1C WITH ESTIMATED AVERAGE GLUCOSE RESULT: 5.6 % — SIGNIFICANT CHANGE UP (ref 4–5.6)
ALBUMIN SERPL ELPH-MCNC: 3.9 G/DL — SIGNIFICANT CHANGE UP (ref 3.5–5.2)
ALP SERPL-CCNC: 141 U/L — HIGH (ref 30–115)
ALT FLD-CCNC: 20 U/L — SIGNIFICANT CHANGE UP (ref 0–41)
ANION GAP SERPL CALC-SCNC: 11 MMOL/L — SIGNIFICANT CHANGE UP (ref 7–14)
APPEARANCE UR: CLEAR — SIGNIFICANT CHANGE UP
AST SERPL-CCNC: 29 U/L — SIGNIFICANT CHANGE UP (ref 0–41)
BACTERIA # UR AUTO: ABNORMAL
BASOPHILS # BLD AUTO: 0.04 K/UL — SIGNIFICANT CHANGE UP (ref 0–0.2)
BASOPHILS NFR BLD AUTO: 0.6 % — SIGNIFICANT CHANGE UP (ref 0–1)
BILIRUB SERPL-MCNC: 0.2 MG/DL — SIGNIFICANT CHANGE UP (ref 0.2–1.2)
BILIRUB UR-MCNC: NEGATIVE — SIGNIFICANT CHANGE UP
BUN SERPL-MCNC: 20 MG/DL — SIGNIFICANT CHANGE UP (ref 10–20)
CALCIUM SERPL-MCNC: 9.5 MG/DL — SIGNIFICANT CHANGE UP (ref 8.5–10.1)
CHLORIDE SERPL-SCNC: 107 MMOL/L — SIGNIFICANT CHANGE UP (ref 98–110)
CHOLEST SERPL-MCNC: 192 MG/DL — SIGNIFICANT CHANGE UP (ref 100–200)
CO2 SERPL-SCNC: 25 MMOL/L — SIGNIFICANT CHANGE UP (ref 17–32)
COLOR SPEC: YELLOW — SIGNIFICANT CHANGE UP
CREAT SERPL-MCNC: 1 MG/DL — SIGNIFICANT CHANGE UP (ref 0.7–1.5)
DIFF PNL FLD: NEGATIVE — SIGNIFICANT CHANGE UP
EOSINOPHIL # BLD AUTO: 0.32 K/UL — SIGNIFICANT CHANGE UP (ref 0–0.7)
EOSINOPHIL NFR BLD AUTO: 5.2 % — SIGNIFICANT CHANGE UP (ref 0–8)
EPI CELLS # UR: ABNORMAL /HPF
ESTIMATED AVERAGE GLUCOSE: 114 MG/DL — SIGNIFICANT CHANGE UP (ref 68–114)
GLUCOSE SERPL-MCNC: 105 MG/DL — HIGH (ref 70–99)
GLUCOSE UR QL: NEGATIVE MG/DL — SIGNIFICANT CHANGE UP
HCT VFR BLD CALC: 39.8 % — SIGNIFICANT CHANGE UP (ref 37–47)
HDLC SERPL-MCNC: 40 MG/DL — LOW
HGB BLD-MCNC: 12.9 G/DL — SIGNIFICANT CHANGE UP (ref 12–16)
IMM GRANULOCYTES NFR BLD AUTO: 0.2 % — SIGNIFICANT CHANGE UP (ref 0.1–0.3)
KETONES UR-MCNC: NEGATIVE — SIGNIFICANT CHANGE UP
LEUKOCYTE ESTERASE UR-ACNC: ABNORMAL
LIPID PNL WITH DIRECT LDL SERPL: 105 MG/DL — SIGNIFICANT CHANGE UP (ref 4–129)
LYMPHOCYTES # BLD AUTO: 1.46 K/UL — SIGNIFICANT CHANGE UP (ref 1.2–3.4)
LYMPHOCYTES # BLD AUTO: 23.7 % — SIGNIFICANT CHANGE UP (ref 20.5–51.1)
MCHC RBC-ENTMCNC: 31.2 PG — HIGH (ref 27–31)
MCHC RBC-ENTMCNC: 32.4 G/DL — SIGNIFICANT CHANGE UP (ref 32–37)
MCV RBC AUTO: 96.1 FL — SIGNIFICANT CHANGE UP (ref 81–99)
MONOCYTES # BLD AUTO: 0.61 K/UL — HIGH (ref 0.1–0.6)
MONOCYTES NFR BLD AUTO: 9.9 % — HIGH (ref 1.7–9.3)
NEUTROPHILS # BLD AUTO: 3.72 K/UL — SIGNIFICANT CHANGE UP (ref 1.4–6.5)
NEUTROPHILS NFR BLD AUTO: 60.4 % — SIGNIFICANT CHANGE UP (ref 42.2–75.2)
NITRITE UR-MCNC: POSITIVE
NRBC # BLD: 0 /100 WBCS — SIGNIFICANT CHANGE UP (ref 0–0)
PH UR: 7 — SIGNIFICANT CHANGE UP (ref 5–8)
PLATELET # BLD AUTO: 273 K/UL — SIGNIFICANT CHANGE UP (ref 130–400)
POTASSIUM SERPL-MCNC: 4.2 MMOL/L — SIGNIFICANT CHANGE UP (ref 3.5–5)
POTASSIUM SERPL-SCNC: 4.2 MMOL/L — SIGNIFICANT CHANGE UP (ref 3.5–5)
PROT SERPL-MCNC: 6.3 G/DL — SIGNIFICANT CHANGE UP (ref 6–8)
PROT UR-MCNC: NEGATIVE MG/DL — SIGNIFICANT CHANGE UP
RBC # BLD: 4.14 M/UL — LOW (ref 4.2–5.4)
RBC # FLD: 14.4 % — SIGNIFICANT CHANGE UP (ref 11.5–14.5)
RBC CASTS # UR COMP ASSIST: NEGATIVE — SIGNIFICANT CHANGE UP
SARS-COV-2 IGG SERPL QL IA: NEGATIVE — SIGNIFICANT CHANGE UP
SARS-COV-2 IGM SERPL IA-ACNC: <0.1 INDEX — SIGNIFICANT CHANGE UP
SODIUM SERPL-SCNC: 143 MMOL/L — SIGNIFICANT CHANGE UP (ref 135–146)
SP GR SPEC: 1.02 — SIGNIFICANT CHANGE UP (ref 1.01–1.03)
TOTAL CHOLESTEROL/HDL RATIO MEASUREMENT: 4.8 RATIO — SIGNIFICANT CHANGE UP (ref 4–5.5)
TRIGL SERPL-MCNC: 239 MG/DL — HIGH (ref 10–149)
TROPONIN T SERPL-MCNC: 0.03 NG/ML — CRITICAL HIGH
UROBILINOGEN FLD QL: 0.2 MG/DL — SIGNIFICANT CHANGE UP (ref 0.2–0.2)
WBC # BLD: 6.16 K/UL — SIGNIFICANT CHANGE UP (ref 4.8–10.8)
WBC # FLD AUTO: 6.16 K/UL — SIGNIFICANT CHANGE UP (ref 4.8–10.8)
WBC UR QL: >50 /HPF

## 2020-10-16 PROCEDURE — 95819 EEG AWAKE AND ASLEEP: CPT | Mod: 26

## 2020-10-16 PROCEDURE — 99232 SBSQ HOSP IP/OBS MODERATE 35: CPT

## 2020-10-16 PROCEDURE — 99233 SBSQ HOSP IP/OBS HIGH 50: CPT

## 2020-10-16 RX ORDER — RIVAROXABAN 15 MG-20MG
20 KIT ORAL
Refills: 0 | Status: DISCONTINUED | OUTPATIENT
Start: 2020-10-16 | End: 2020-10-18

## 2020-10-16 RX ORDER — RIVAROXABAN 15 MG-20MG
1 KIT ORAL
Qty: 5 | Refills: 0
Start: 2020-10-16 | End: 2020-10-20

## 2020-10-16 RX ADMIN — Medication 0.5 MILLIGRAM(S): at 17:22

## 2020-10-16 RX ADMIN — AMLODIPINE BESYLATE 2.5 MILLIGRAM(S): 2.5 TABLET ORAL at 05:38

## 2020-10-16 RX ADMIN — BUDESONIDE AND FORMOTEROL FUMARATE DIHYDRATE 2 PUFF(S): 160; 4.5 AEROSOL RESPIRATORY (INHALATION) at 19:33

## 2020-10-16 RX ADMIN — LAMOTRIGINE 25 MILLIGRAM(S): 25 TABLET, ORALLY DISINTEGRATING ORAL at 05:37

## 2020-10-16 RX ADMIN — Medication 0.5 MILLIGRAM(S): at 05:40

## 2020-10-16 RX ADMIN — RIVAROXABAN 20 MILLIGRAM(S): KIT at 17:22

## 2020-10-16 RX ADMIN — QUETIAPINE FUMARATE 200 MILLIGRAM(S): 200 TABLET, FILM COATED ORAL at 11:45

## 2020-10-16 RX ADMIN — MONTELUKAST 10 MILLIGRAM(S): 4 TABLET, CHEWABLE ORAL at 11:45

## 2020-10-16 RX ADMIN — PANTOPRAZOLE SODIUM 40 MILLIGRAM(S): 20 TABLET, DELAYED RELEASE ORAL at 05:37

## 2020-10-16 RX ADMIN — Medication 100 MILLIGRAM(S): at 05:37

## 2020-10-16 RX ADMIN — Medication 40 MILLIGRAM(S): at 05:37

## 2020-10-16 RX ADMIN — Medication 81 MILLIGRAM(S): at 11:45

## 2020-10-16 RX ADMIN — BUDESONIDE AND FORMOTEROL FUMARATE DIHYDRATE 2 PUFF(S): 160; 4.5 AEROSOL RESPIRATORY (INHALATION) at 10:43

## 2020-10-16 RX ADMIN — LAMOTRIGINE 25 MILLIGRAM(S): 25 TABLET, ORALLY DISINTEGRATING ORAL at 17:22

## 2020-10-16 RX ADMIN — DULOXETINE HYDROCHLORIDE 30 MILLIGRAM(S): 30 CAPSULE, DELAYED RELEASE ORAL at 11:45

## 2020-10-16 RX ADMIN — ATORVASTATIN CALCIUM 80 MILLIGRAM(S): 80 TABLET, FILM COATED ORAL at 21:28

## 2020-10-16 NOTE — PROGRESS NOTE ADULT - SUBJECTIVE AND OBJECTIVE BOX
Patient is a 75y old  Female who presents with a chief complaint of slurred speech (15 Oct 2020 15:59)        Over Night Events:        ROS:     All ROS are negative except HPI         PHYSICAL EXAM    ICU Vital Signs Last 24 Hrs  T(C): 36.8 (15 Oct 2020 23:28), Max: 36.9 (15 Oct 2020 20:53)  T(F): 98.3 (15 Oct 2020 23:28), Max: 98.4 (15 Oct 2020 20:53)  HR: 71 (16 Oct 2020 05:28) (67 - 90)  BP: 116/56 (16 Oct 2020 05:28) (105/53 - 168/74)  BP(mean): 81 (16 Oct 2020 05:28) (76 - 106)  ABP: --  ABP(mean): --  RR: 20 (16 Oct 2020 05:28) (18 - 20)  SpO2: 100% (16 Oct 2020 05:28) (98% - 100%)       CONSTITUTIONAL:   not septic appearing,   well nourished,   NAD    · ENMT:   Airway patent,   Nasal mucosa clear.  Mouth with normal mucosa.   No thrush    · EYES:   Clear bilaterally,   pupils equal,   round and reactive to light.    · CARDIAC:   Normal rate,   Irregular rhythm.    Heart sounds S1, S2.   No murmurs, no rubs or gallops on auscultation  no edema    · RESPIRATORY:   no w/r/r/,   normal chest expansion  no tachypnea,  no retractions or use of accessory muscles  palpation of chest is normal with no fremitus  percussion of chest demonstrates no hyperresonance or dullness    · GASTROINTESTINAL:  Abdomen soft,   non-tender,   + BS  liver/spleen not palpable    · MUSCULOSKELETAL:   no clubbing, cyanosis    · NEUROLOGICAL:   Alert and oriented   no obvious focal deficits in cranial nerve areas  no motor or sensory deficits.      · SKIN:   Skin normal color for race,   warm, dry   No evidence of rash.    · PSYCHIATRIC:   Alert and oriented to person,   place, time/situation.    10-15-20 @ 07:01  -  10-16-20 @ 07:00  --------------------------------------------------------  IN:    Oral Fluid: 480 mL  Total IN: 480 mL    OUT:    Indwelling Catheter - Urethral (mL): 3200 mL  Total OUT: 3200 mL  Total NET: -2720 mL      LABS:                        12.9   6.16  )-----------( 273      ( 16 Oct 2020 05:42 )             39.8     143  |  107  |  20  ----------------------------<  105<H>  4.2   |  25  |  1.0    Ca    9.5      16 Oct 2020 05:42    TPro  6.3  /  Alb  3.9  /  TBili  0.2  /  DBili  x   /  AST  29  /  ALT  20  /  AlkPhos  141<H>  10-16    PT/INR - ( 14 Oct 2020 17:06 )   PT: 13.70 sec;   INR: 1.19 ratio    PTT - ( 14 Oct 2020 17:06 )  PTT:33.0 sec                                           CARDIAC MARKERS ( 16 Oct 2020 05:42 )  x     / 0.03 ng/mL / x     / x     / x      CARDIAC MARKERS ( 15 Oct 2020 21:58 )  x     / <0.01 ng/mL / x     / x     / x      CARDIAC MARKERS ( 15 Oct 2020 12:37 )  x     / 0.02 ng/mL / 69 U/L / x     / 8.0 ng/mL  CARDIAC MARKERS ( 15 Oct 2020 06:00 )  x     / 0.05 ng/mL / x     / x     / x      CARDIAC MARKERS ( 14 Oct 2020 17:06 )  x     / 0.01 ng/mL / 64 U/L / x     / 6.4 ng/mL    LIVER FUNCTIONS - ( 16 Oct 2020 05:42 )  Alb: 3.9 g/dL / Pro: 6.3 g/dL / ALK PHOS: 141 U/L / ALT: 20 U/L / AST: 29 U/L / GGT: x              MEDICATIONS  (STANDING):  amLODIPine   Tablet 2.5 milliGRAM(s) Oral daily  aspirin  chewable 81 milliGRAM(s) Oral daily  atorvastatin 80 milliGRAM(s) Oral at bedtime  budesonide 160 MICROgram(s)/formoterol 4.5 MICROgram(s) Inhaler 2 Puff(s) Inhalation two times a day  chlorhexidine 4% Liquid 1 Application(s) Topical <User Schedule>  DULoxetine 30 milliGRAM(s) Oral daily  enoxaparin Injectable 40 milliGRAM(s) SubCutaneous daily  furosemide    Tablet 40 milliGRAM(s) Oral daily  gabapentin 300 milliGRAM(s) Oral three times a day  lamoTRIgine 25 milliGRAM(s) Oral two times a day  LORazepam     Tablet 0.5 milliGRAM(s) Oral two times a day  metoprolol tartrate 100 milliGRAM(s) Oral two times a day  montelukast 10 milliGRAM(s) Oral daily  pantoprazole    Tablet 40 milliGRAM(s) Oral before breakfast  QUEtiapine 200 milliGRAM(s) Oral daily    MEDICATIONS  (PRN):  guaifenesin/dextromethorphan  Syrup 10 milliLiter(s) Oral every 4 hours PRN congestion      < from: MR Angio Neck No Cont (10.15.20 @ 20:15) >  FINDINGS:  The ventricles and sulci are appropriate for the age.  There are scattered generally punctiform to small foci of increased T2 signal within the periventricular subcortical white matter. Findings are nonspecific but probably reflect microvascular ischemic change.  There is no restricted diffusion to indicate acute infarction.  The cerebellum and brainstem are unremarkable in appearance.  There is normal highvelocity signal loss present in the major vessels.  There is a partially empty sella turcica. There is a 1 cm pineal cyst. The structures at the foramen magnum are unremarkable.    MRA BRAIN:  There is normal signal and caliber within the petrous, cavernous and supraclinoid portions of the internal carotid arteries. The ophthalmic arteries are normal. The posterior communicating arteries are patent. The anterior and middle cerebral arteries are normal.  The region of the anterior communicating artery is unremarkable.  The vertebral arteries are patent, mild dominance on the left. There is visualization of a left PICA. The basilar artery, superior cerebellar and posterior cerebral arteries are patent. There may be a duplicated superiorcerebellar artery on the left. The anterior communicating arteries are patent, more pronounced on the right.    IMPRESSION:  MRI brain and MRA brain: Essentially normal  1.  No acute stroke  2.  1 cm pineal cyst  3.  Scattered form of mild microvascular ischemic change.  4.  No major vessel stenosis  < end of copied text >    < from: VA Duplex Carotid, Bilat (10.15.20 @ 09:49) >  Impression:  20-39% stenosis of the right internal carotid artery.  > 80% stenosis of the left internal carotid artery.  < end of copied text > Patient is a 75y old  Female who presents with a chief complaint of slurred speech (15 Oct 2020 15:59)    Over Night Events: No overnight events. Feels better.    ROS:   All ROS are negative except HPI     PHYSICAL EXAM  ICU Vital Signs Last 24 Hrs  T(C): 36.8 (15 Oct 2020 23:28), Max: 36.9 (15 Oct 2020 20:53)  T(F): 98.3 (15 Oct 2020 23:28), Max: 98.4 (15 Oct 2020 20:53)  HR: 71 (16 Oct 2020 05:28) (67 - 90)  BP: 116/56 (16 Oct 2020 05:28) (105/53 - 168/74)  BP(mean): 81 (16 Oct 2020 05:28) (76 - 106)  RR: 20 (16 Oct 2020 05:28) (18 - 20)  SpO2: 100% (16 Oct 2020 05:28) (98% - 100%)       CONSTITUTIONAL:   not septic appearing,   well nourished,   NAD    · ENMT:   Airway patent,   Nasal mucosa clear.  Mouth with normal mucosa.   No thrush    · EYES:   Clear bilaterally,   pupils equal,   round and reactive to light.    · CARDIAC:   Normal rate,   Irregular rhythm.    Heart sounds S1, S2.   No murmurs, no rubs or gallops on auscultation  no edema    · RESPIRATORY:   no w/r/r/,   normal chest expansion  no tachypnea,  no retractions or use of accessory muscles  palpation of chest is normal with no fremitus  percussion of chest demonstrates no hyperresonance or dullness    · GASTROINTESTINAL:  Abdomen soft,   non-tender,   + BS  liver/spleen not palpable    · MUSCULOSKELETAL:   no clubbing, cyanosis    · NEUROLOGICAL:   Alert and oriented   no obvious focal deficits in cranial nerve areas  no motor or sensory deficits.      · SKIN:   Skin normal color for race,   warm, dry   No evidence of rash.    · PSYCHIATRIC:   Alert and oriented to person,   place, time/situation.    10-15-20 @ 07:01  -  10-16-20 @ 07:00  --------------------------------------------------------  IN:    Oral Fluid: 480 mL  Total IN: 480 mL    OUT:    Indwelling Catheter - Urethral (mL): 3200 mL  Total OUT: 3200 mL  Total NET: -2720 mL      LABS:                        12.9   6.16  )-----------( 273      ( 16 Oct 2020 05:42 )             39.8     143  |  107  |  20  ----------------------------<  105<H>  4.2   |  25  |  1.0    Ca    9.5      16 Oct 2020 05:42    TPro  6.3  /  Alb  3.9  /  TBili  0.2  /  DBili  x   /  AST  29  /  ALT  20  /  AlkPhos  141<H>  10-16    PT/INR - ( 14 Oct 2020 17:06 )   PT: 13.70 sec;   INR: 1.19 ratio    PTT - ( 14 Oct 2020 17:06 )  PTT:33.0 sec                                           CARDIAC MARKERS ( 16 Oct 2020 05:42 )  x     / 0.03 ng/mL / x     / x     / x      CARDIAC MARKERS ( 15 Oct 2020 21:58 )  x     / <0.01 ng/mL / x     / x     / x      CARDIAC MARKERS ( 15 Oct 2020 12:37 )  x     / 0.02 ng/mL / 69 U/L / x     / 8.0 ng/mL  CARDIAC MARKERS ( 15 Oct 2020 06:00 )  x     / 0.05 ng/mL / x     / x     / x      CARDIAC MARKERS ( 14 Oct 2020 17:06 )  x     / 0.01 ng/mL / 64 U/L / x     / 6.4 ng/mL    LIVER FUNCTIONS - ( 16 Oct 2020 05:42 )  Alb: 3.9 g/dL / Pro: 6.3 g/dL / ALK PHOS: 141 U/L / ALT: 20 U/L / AST: 29 U/L / GGT: x              MEDICATIONS  (STANDING):  amLODIPine   Tablet 2.5 milliGRAM(s) Oral daily  aspirin  chewable 81 milliGRAM(s) Oral daily  atorvastatin 80 milliGRAM(s) Oral at bedtime  budesonide 160 MICROgram(s)/formoterol 4.5 MICROgram(s) Inhaler 2 Puff(s) Inhalation two times a day  chlorhexidine 4% Liquid 1 Application(s) Topical <User Schedule>  DULoxetine 30 milliGRAM(s) Oral daily  enoxaparin Injectable 40 milliGRAM(s) SubCutaneous daily  furosemide    Tablet 40 milliGRAM(s) Oral daily  gabapentin 300 milliGRAM(s) Oral three times a day  lamoTRIgine 25 milliGRAM(s) Oral two times a day  LORazepam     Tablet 0.5 milliGRAM(s) Oral two times a day  metoprolol tartrate 100 milliGRAM(s) Oral two times a day  montelukast 10 milliGRAM(s) Oral daily  pantoprazole    Tablet 40 milliGRAM(s) Oral before breakfast  QUEtiapine 200 milliGRAM(s) Oral daily    MEDICATIONS  (PRN):  guaifenesin/dextromethorphan  Syrup 10 milliLiter(s) Oral every 4 hours PRN congestion      < from: MR Angio Neck No Cont (10.15.20 @ 20:15) >  FINDINGS:  The ventricles and sulci are appropriate for the age.  There are scattered generally punctiform to small foci of increased T2 signal within the periventricular subcortical white matter. Findings are nonspecific but probably reflect microvascular ischemic change.  There is no restricted diffusion to indicate acute infarction.  The cerebellum and brainstem are unremarkable in appearance.  There is normal highvelocity signal loss present in the major vessels.  There is a partially empty sella turcica. There is a 1 cm pineal cyst. The structures at the foramen magnum are unremarkable.    MRA BRAIN:  There is normal signal and caliber within the petrous, cavernous and supraclinoid portions of the internal carotid arteries. The ophthalmic arteries are normal. The posterior communicating arteries are patent. The anterior and middle cerebral arteries are normal.  The region of the anterior communicating artery is unremarkable.  The vertebral arteries are patent, mild dominance on the left. There is visualization of a left PICA. The basilar artery, superior cerebellar and posterior cerebral arteries are patent. There may be a duplicated superiorcerebellar artery on the left. The anterior communicating arteries are patent, more pronounced on the right.    IMPRESSION:  MRI brain and MRA brain: Essentially normal  1.  No acute stroke  2.  1 cm pineal cyst  3.  Scattered form of mild microvascular ischemic change.  4.  No major vessel stenosis  < end of copied text >    < from: VA Duplex Carotid, Bilat (10.15.20 @ 09:49) >  Impression:  20-39% stenosis of the right internal carotid artery.  > 80% stenosis of the left internal carotid artery.  < end of copied text >

## 2020-10-16 NOTE — PROGRESS NOTE ADULT - SUBJECTIVE AND OBJECTIVE BOX
Patient is a 75y old  Female who presents with a chief complaint of slurred speech (15 Oct 2020 15:59)      T(F): 98.3 (10-15-20 @ 23:28), Max: 98.4 (10-15-20 @ 20:53)  HR: 71 (10-16-20 @ 05:28)  BP: 116/56 (10-16-20 @ 05:28)  RR: 20 (10-16-20 @ 05:28)  SpO2: 100% (10-16-20 @ 05:28) (98% - 100%)    PHYSICAL EXAM:  GENERAL: NAD, well-groomed, well-developed  HEAD:  Atraumatic, Normocephalic  EYES: EOMI, PERRLA, conjunctiva and sclera clear  ENMT: No tonsillar erythema, exudates, or enlargement; Moist mucous membranes, Good dentition, No lesions  NECK: Supple, No JVD, Normal thyroid  NERVOUS SYSTEM:  Alert & Oriented X3,  Motor Strength 5/5 B/L upper and lower extremities  CHEST/LUNG: Clear to percussion bilaterally; No rales, rhonchi, wheezing, or rubs  HEART: Regular rate and rhythm; No murmurs, rubs, or gallops  ABDOMEN: Soft, Nontender, Nondistended; Bowel sounds present  EXTREMITIES:   No clubbing, cyanosis, or edema  LYMPH: No lymphadenopathy noted  SKIN: No rashes or lesions    labs  10-16    143  |  107  |  20  ----------------------------<  105<H>  4.2   |  25  |  1.0    Ca    9.5      16 Oct 2020 05:42    TPro  6.3  /  Alb  3.9  /  TBili  0.2  /  DBili  x   /  AST  29  /  ALT  20  /  AlkPhos  141<H>  10-16                          12.9   6.16  )-----------( 273      ( 16 Oct 2020 05:42 )             39.8       PT/INR - ( 14 Oct 2020 17:06 )   PT: 13.70 sec;   INR: 1.19 ratio         PTT - ( 14 Oct 2020 17:06 )  PTT:33.0 sec    Troponin T, Serum: 0.03 ng/mL <HH> (10-16-20 @ 05:42)  Troponin T, Serum: <0.01 ng/mL (10-15-20 @ 21:58)  Creatine Kinase, Serum: 69 U/L (10-15-20 @ 12:37)  Troponin T, Serum: 0.02 ng/mL <H> (10-15-20 @ 12:37)      amLODIPine   Tablet 2.5 milliGRAM(s) Oral daily  aspirin  chewable 81 milliGRAM(s) Oral daily  atorvastatin 80 milliGRAM(s) Oral at bedtime  budesonide 160 MICROgram(s)/formoterol 4.5 MICROgram(s) Inhaler 2 Puff(s) Inhalation two times a day  chlorhexidine 4% Liquid 1 Application(s) Topical <User Schedule>  DULoxetine 30 milliGRAM(s) Oral daily  enoxaparin Injectable 40 milliGRAM(s) SubCutaneous daily  furosemide    Tablet 40 milliGRAM(s) Oral daily  gabapentin 300 milliGRAM(s) Oral three times a day  guaifenesin/dextromethorphan  Syrup 10 milliLiter(s) Oral every 4 hours PRN  lamoTRIgine 25 milliGRAM(s) Oral two times a day  LORazepam     Tablet 0.5 milliGRAM(s) Oral two times a day  metoprolol tartrate 100 milliGRAM(s) Oral two times a day  montelukast 10 milliGRAM(s) Oral daily  pantoprazole    Tablet 40 milliGRAM(s) Oral before breakfast  QUEtiapine 200 milliGRAM(s) Oral daily

## 2020-10-16 NOTE — SPEECH LANGUAGE PATHOLOGY EVALUATION - SLP PERTINENT HISTORY OF CURRENT PROBLEM
known to slp, admit s/p 76 yo f h/o HTN, HLD, bipolar disorder, CHF, COPD on home O2 p/w slurred speech and s/p episode of afib converted in the field now in NSR.

## 2020-10-16 NOTE — PROGRESS NOTE ADULT - ASSESSMENT
IMPRESSION:  TIA  Left ICA > 80% stenosis  CVA ruled out  r/o Seizure  AFib with RVR s/p electric cardioversion by EMS  Polypharmacy      PLAN:    CNS:  MRI, MRA H&N negative for CVA.  FU REEG report.  C/w ASA & Statin.  Neurology following.  Vascular following.    HEENT: Oral care    PULMONARY:  HOB @ 45 degrees.  Aspiration precautions.    CARDIOVASCULAR:   Rate controlled.  ECHO.  Maximize cardiac therapy and volume status.  Cardio following.    GI: GI prophylaxis.  Feeding per S&S.  Bowel regimen     RENAL:  Follow up lytes.  Correct as needed    INFECTIOUS DISEASE: Follow up cultures    HEMATOLOGICAL:  Will need full AC once CVA r/o, FU with Neurology.  DVT prophylaxis.    ENDOCRINE:  Follow up FS.  Insulin protocol if needed.  TSH wnl    MUSCULOSKELETAL:  OOB to chair, fall precautions, PT/OT/PMR    Advanced directives and goals of care  Telemetry monitoring IMPRESSION:  TIA  Left ICA > 80% stenosis  CVA ruled out  r/o Seizure  AFib with RVR s/p electric cardioversion by EMS  Polypharmacy      PLAN:    CNS:  MRI, MRA H&N negative for CVA.  FU REEG report.  C/w ASA & Statin.  Neurology following.  Vascular following.    HEENT: Oral care    PULMONARY:  HOB @ 45 degrees.  Aspiration precautions.    CARDIOVASCULAR:   Rate controlled with beta blocker.  ECHO.  Maximize cardiac therapy and volume status.  Cardio following.    GI: GI prophylaxis.  Feeding per S&S.  Bowel regimen     RENAL:  Follow up lytes.  Correct as needed    INFECTIOUS DISEASE: Follow up cultures    HEMATOLOGICAL:  Will need full AC once CVA r/o, FU with Neurology.  DVT prophylaxis.    ENDOCRINE:  Follow up FS.  Insulin protocol if needed.  TSH wnl    MUSCULOSKELETAL:  OOB to chair, fall precautions, PT/OT/PMR    Advanced directives and goals of care  Telemetry monitoring IMPRESSION:  TIA  Left ICA > 80% stenosis  CVA ruled out  r/o Seizure  AFib with RVR s/p electric cardioversion by EMS  Polypharmacy      PLAN:    CNS:  MRI, MRA H&N negative for CVA.  FU REEG report.  C/w ASA & Statin.  Neurology following.  Vascular following.    HEENT: Oral care    PULMONARY:  HOB @ 45 degrees.  Aspiration precautions.    CARDIOVASCULAR:   Rate controlled with beta blocker.  ECHO.  Maximize cardiac therapy and volume status.  Cardio following.    GI: GI prophylaxis.  Feeding per S&S.  Bowel regimen     RENAL:  Follow up lytes.  Correct as needed    INFECTIOUS DISEASE: Follow up cultures    HEMATOLOGICAL:  Will need full AC once CVA r/o, FU with Neurology.  DVT prophylaxis.    ENDOCRINE:  Follow up FS.  Insulin protocol if needed.  TSH wnl    MUSCULOSKELETAL: Ambulate as tolerated, fall precautions, PT/OT/PMR    Advanced directives and goals of care  Telemetry monitoring IMPRESSION:  TIA  Left ICA > 80% stenosis  CVA ruled out  r/o Seizure  AFib with RVR s/p electric cardioversion by EMS  Polypharmacy  COPD on chronic O2 not active at present      PLAN:    CNS:  MRI, MRA H&N negative for CVA.  FU EEG report.  C/w ASA & Statin.  Neurology following.  Vascular following.    HEENT: Oral care    PULMONARY:  HOB @ 45 degrees.  Aspiration precautions.  cont O2 and OP meds    CARDIOVASCULAR:   Rate controlled with beta blocker.  ECHO.  Maximize cardiac therapy and volume status.  Cardio following.    GI: GI prophylaxis.  Feeding per S&S.  Bowel regimen     RENAL:  Follow up lytes.  Correct as needed    INFECTIOUS DISEASE: Follow up cultures    HEMATOLOGICAL:  Will need full AC once CVA r/o, FU with Neurology.  DVT prophylaxis.    ENDOCRINE:  Follow up FS.  Insulin protocol if needed.  TSH wnl    MUSCULOSKELETAL: Ambulate as tolerated, fall precautions, PT/OT/PMR    Advanced directives and goals of care  Telemetry monitoring IMPRESSION:  TIA  CVA ruled out  AFib with RVR s/p electric cardioversion by EMS  Polypharmacy  COPD on chronic O2 not active at present      PLAN:    CNS:  MRI, MRA H&N negative for CVA.  C/w ASA & Statin.  Neurology following.    HEENT: Oral care    PULMONARY:  HOB @ 45 degrees.  Aspiration precautions.  cont O2 and OP meds    CARDIOVASCULAR:   Rate controlled with beta blocker.  ECHO.  Maximize cardiac therapy and volume status.  Cardio following.    GI: GI prophylaxis.  Feeding per S&S.  Bowel regimen     RENAL:  Follow up lytes.  Correct as needed    INFECTIOUS DISEASE: Follow up cultures    HEMATOLOGICAL:  Start Xarelto 20 HS.  DVT prophylaxis.    ENDOCRINE:  Follow up FS.  Insulin protocol if needed.  TSH wnl    MUSCULOSKELETAL: Ambulate as tolerated, fall precautions, PT/OT/PMR    D/c stephens  Advanced directives and goals of care  Downgrade to F

## 2020-10-16 NOTE — PROGRESS NOTE ADULT - SUBJECTIVE AND OBJECTIVE BOX
CC.  Slurred speech  HPI.  Patient reports that she feels better.  Offers no other complaints                Constitutional: No fever, fatigue or weight loss.  Skin: No rash.  Eyes: No recent vision problems or eye pain.  ENT: No congestion, ear pain, or sore throat.  Endocrine: No thyroid problems.  Cardiovascular: No chest pain or palpation.  Respiratory: No cough, shortness of breath, congestion, or wheezing.  Gastrointestinal: No abdominal pain, nausea, vomiting, or diarrhea.  Genitourinary: No dysuria.  Musculoskeletal: No joint swelling.  Neurologic: No headache.      Vital Signs Last 24 Hrs  T(C): 36.5 (10-16-20 @ 07:36), Max: 36.9 (10-15-20 @ 20:53)  T(F): 97.7 (10-16-20 @ 07:36), Max: 98.4 (10-15-20 @ 20:53)  HR: 65 (10-16-20 @ 07:36) (65 - 90)  BP: 145/65 (10-16-20 @ 07:36) (105/53 - 168/74)  BP(mean): 94 (10-16-20 @ 07:36) (76 - 106)  RR: 20 (10-16-20 @ 05:28) (18 - 20)  SpO2: 99% (10-16-20 @ 07:36) (98% - 100%)        PHYSICAL EXAM-  GENERAL: NAD,chronic ill appearing female  HEAD:  Atraumatic, Normocephalic  EYES: EOMI, PERRLA, conjunctiva and sclera clear  NECK: Supple, No JVD, Normal thyroid  NERVOUS SYSTEM:  Alert & Oriented moving all extremities  CHEST/LUNG: Clear to percussion bilaterally; No rales, rhonchi, wheezing, or rubs  HEART: Regular rate and rhythm; No murmurs, rubs, or gallops  ABDOMEN: Soft, Nontender, Nondistended; Bowel sounds present  EXTREMITIES:  No clubbing, cyanosis,   SKIN: No rashes or lesions                                  12.9   6.16  )-----------( 273      ( 16 Oct 2020 05:42 )             39.8     10-16    143  |  107  |  20  ----------------------------<  105<H>  4.2   |  25  |  1.0    Ca    9.5      16 Oct 2020 05:42    TPro  6.3  /  Alb  3.9  /  TBili  0.2  /  DBili  x   /  AST  29  /  ALT  20  /  AlkPhos  141<H>  10-16    CARDIAC MARKERS ( 16 Oct 2020 05:42 )  x     / 0.03 ng/mL / x     / x     / x      CARDIAC MARKERS ( 15 Oct 2020 21:58 )  x     / <0.01 ng/mL / x     / x     / x      CARDIAC MARKERS ( 15 Oct 2020 12:37 )  x     / 0.02 ng/mL / 69 U/L / x     / 8.0 ng/mL  CARDIAC MARKERS ( 15 Oct 2020 06:00 )  x     / 0.05 ng/mL / x     / x     / x      CARDIAC MARKERS ( 14 Oct 2020 17:06 )  x     / 0.01 ng/mL / 64 U/L / x     / 6.4 ng/mL          PT/INR - ( 14 Oct 2020 17:06 )   PT: 13.70 sec;   INR: 1.19 ratio         PTT - ( 14 Oct 2020 17:06 )  PTT:33.0 sec        MEDICATIONS  (STANDING):  amLODIPine   Tablet 2.5 milliGRAM(s) Oral daily  aspirin  chewable 81 milliGRAM(s) Oral daily  atorvastatin 80 milliGRAM(s) Oral at bedtime  budesonide 160 MICROgram(s)/formoterol 4.5 MICROgram(s) Inhaler 2 Puff(s) Inhalation two times a day  chlorhexidine 4% Liquid 1 Application(s) Topical <User Schedule>  DULoxetine 30 milliGRAM(s) Oral daily  enoxaparin Injectable 40 milliGRAM(s) SubCutaneous daily  furosemide    Tablet 40 milliGRAM(s) Oral daily  gabapentin 300 milliGRAM(s) Oral three times a day  lamoTRIgine 25 milliGRAM(s) Oral two times a day  LORazepam     Tablet 0.5 milliGRAM(s) Oral two times a day  metoprolol tartrate 100 milliGRAM(s) Oral two times a day  montelukast 10 milliGRAM(s) Oral daily  pantoprazole    Tablet 40 milliGRAM(s) Oral before breakfast  QUEtiapine 200 milliGRAM(s) Oral daily    MEDICATIONS  (PRN):  guaifenesin/dextromethorphan  Syrup 10 milliLiter(s) Oral every 4 hours PRN congestion        Care Discussed with Consultants/Other Providers [x ] YES  [ ] No medical contraindication for discharge  [x ] YES  [ ] NO    Consultant(s) Notes Reviewed:  [x ] YES  [ ] NO    Care Discussed with Consultants/Other Providers [x ] YES  [ ] NO

## 2020-10-16 NOTE — PROGRESS NOTE ADULT - ASSESSMENT
74 yo f h/o HTN, HLD, bipolar disorder, CHF, COPD on home O2 p/w slurred speech and s/p episode of afib converted in the field now in NSR.     #Slurred Speech  - TIA vs stroke vs polypharmacy vs seizure  c/f polypharmacy since  states pt took tramadol and muscle relaxer   head CT negative  no speech defecit or focal neurological defecits at this time,   -MRI/MRA shows No acute stroke, 1 cm pineal cyst, Scattered form of mild microvascular ischemic change, No major vessel stenosis  -EEG ok.    -Continue with ASA/Statin  -TTE pending  -Neurology and vascular to follow up   PT/OT     #afib s/p cardioversion in the field  continue with BB  Anticoagulation as per cardiology    #copd on home 02   O2 prn, bipap prn   continue singular     # HTN   continue lopressor, norvasc     #CHF   continue lasix     #Bipolar   continue Lamictal     Dvt ppx   gi ppx   OOB   Full code     #Progress Note Handoff  Pending (specify): neurology f/u    Family discussion:  plan of care was discussed with patient  in details.  all questions were answered.  seems to understand, and in agreement  Disposition:  unknown

## 2020-10-16 NOTE — PHYSICAL THERAPY INITIAL EVALUATION ADULT - GENERAL OBSERVATIONS, REHAB EVAL
Pt encountered sitting in recliner bedside, NAD, +tele, on 3.5 L02/min via NC, ok to be seen by PT as confirmed by RN and pt agreeable.

## 2020-10-16 NOTE — PHYSICAL THERAPY INITIAL EVALUATION ADULT - PLANNED THERAPY INTERVENTIONS, PT EVAL
strengthening/gait training/postural re-education/transfer training/balance training/bed mobility training

## 2020-10-16 NOTE — PROGRESS NOTE ADULT - SUBJECTIVE AND OBJECTIVE BOX
Neurology Follow up note    Name  SHAUN COATES    HPI:  pt is a 76 yo f h/o HTN, HLD, bipolar disorder, CHF, COPD on 4L NC at home noted to have slurred speech noted by . As per report EMS found pt without verbal response Afib, she was cardioverted in the field and arrived to ER in NSR. On arrival pt awake and alert c/o generalized weakness, mild, no specific pain or radiation. Stroke code called, head CT without pathology. pt with slurred speech, no facail droop or unilateral weakness.    reports pt took tramadol and a muscle relaxer for pain.   Denies fever, chills, cp, sob,   abd pain, N/V/D dizziness, numbness, tingling.  (14 Oct 2020 22:38)      Interval History:    patient remains asymptomatic  mri brain and eeg unremarkable      Vital Signs Last 24 Hrs  T(C): 36.5 (16 Oct 2020 07:36), Max: 36.9 (15 Oct 2020 20:53)  T(F): 97.7 (16 Oct 2020 07:36), Max: 98.4 (15 Oct 2020 20:53)  HR: 65 (16 Oct 2020 07:36) (65 - 90)  BP: 145/65 (16 Oct 2020 07:36) (105/53 - 168/74)  BP(mean): 94 (16 Oct 2020 07:36) (76 - 106)  RR: 20 (16 Oct 2020 05:28) (18 - 20)  SpO2: 99% (16 Oct 2020 07:36) (98% - 100%)    Neurological Exam:   Mental status: Awake, alert and oriented x3.  Recent and remote memory intact.  Naming, repetition and comprehension intact.  Attention/concentration intact.  No dysarthria, no aphasia.  Fund of knowledge appropriate.    Cranial nerves: Pupils equally round and reactive to light, visual fields full, no nystagmus, extraocular muscles intact, V1 through V3 intact bilaterally and symmetric, face symmetric, hearing intact to finger rub, palate elevation symmetric, tongue was midline.  Motor:  MRC grading 5/5 b/l UE/LE.   strength 5/5.  Normal tone and bulk.  No abnormal movements.    Sensation: Intact to light touch, proprioception, and pinprick.   Coordination: No dysmetria on finger-to-nose and heel-to-shin.  No dysdiadokinesia.  Reflexes: 2+ in bilateral UE/LE, downgoing toes bilaterally. (-) Perales.  Gait: Narrow and steady. No ataxia.  Romberg negative    Medications  amLODIPine   Tablet 2.5 milliGRAM(s) Oral daily  aspirin  chewable 81 milliGRAM(s) Oral daily  atorvastatin 80 milliGRAM(s) Oral at bedtime  budesonide 160 MICROgram(s)/formoterol 4.5 MICROgram(s) Inhaler 2 Puff(s) Inhalation two times a day  chlorhexidine 4% Liquid 1 Application(s) Topical <User Schedule>  DULoxetine 30 milliGRAM(s) Oral daily  furosemide    Tablet 40 milliGRAM(s) Oral daily  gabapentin 300 milliGRAM(s) Oral three times a day  guaifenesin/dextromethorphan  Syrup 10 milliLiter(s) Oral every 4 hours PRN  lamoTRIgine 25 milliGRAM(s) Oral two times a day  LORazepam     Tablet 0.5 milliGRAM(s) Oral two times a day  metoprolol tartrate 100 milliGRAM(s) Oral two times a day  montelukast 10 milliGRAM(s) Oral daily  pantoprazole    Tablet 40 milliGRAM(s) Oral before breakfast  QUEtiapine 200 milliGRAM(s) Oral daily  rivaroxaban 20 milliGRAM(s) Oral with dinner      Lab  10-16    143  |  107  |  20  ----------------------------<  105<H>  4.2   |  25  |  1.0    Ca    9.5      16 Oct 2020 05:42    TPro  6.3  /  Alb  3.9  /  TBili  0.2  /  DBili  x   /  AST  29  /  ALT  20  /  AlkPhos  141<H>  10-16                          12.9   6.16  )-----------( 273      ( 16 Oct 2020 05:42 )             39.8     LIVER FUNCTIONS - ( 16 Oct 2020 05:42 )  Alb: 3.9 g/dL / Pro: 6.3 g/dL / ALK PHOS: 141 U/L / ALT: 20 U/L / AST: 29 U/L / GGT: x                   Radiology      Assessment:  76 yo female with new onset afib adm with generalized weakness, bifacial numbness and bilateral hand numbness  mri is neg for acute cva, eeg is normal  hx does not localize to vascular territory  pt states this initially felt like a panic attack which should be kept in ddx  tia w/u neg  Plan:  no further w/u needed from neuro standpoint  d/c plavix  please call with any questions

## 2020-10-16 NOTE — PHARMACOTHERAPY INTERVENTION NOTE - INTERVENTION TYPE RECOOMEND
I have reviewed discharge instructions with the patient. The patient verbalized understanding instructions and need for follow up with primary care provider. Pt is not in any current distress and shows no evidence of clinical instability. Pt left ambulatory. Pt family/friends are present and pt left with all personal belongings. Pt states chief complaint has improved with treatment provided. Pt is alert and oriented to time, place, person and situation, pt hemodynamically/respiratorily stable. Paperwork given and reviewed with patient, opportunity for questions/clarification given.
Pt not seen by this RN
Dose Adjustment - Renal Dose

## 2020-10-16 NOTE — PHYSICAL THERAPY INITIAL EVALUATION ADULT - ADDITIONAL COMMENTS
Pt lives in private house with - daughter lives upstairs. She has 2 steps with rail into house, then 2 steps once inside - then bathroom/bedroom on level ground. At baseline she ambulates with RW. Hx of neuropathy and knee OA

## 2020-10-16 NOTE — PROGRESS NOTE ADULT - ATTENDING COMMENTS
Attending Statement: I have personally performed a face to face diagnostic evaluation on this patient. The patient is suffering from TIA and chronic COPD I have reviewed the above note and agree with the history, exam and suggestions for care, except as I have noted in the text.

## 2020-10-16 NOTE — PROGRESS NOTE ADULT - ASSESSMENT
Patient feels better. She takes beta prn. Reviewed with patient. Aware she needs take beta daily. Continue beta. Check labs. Ambulate if stable

## 2020-10-16 NOTE — SPEECH LANGUAGE PATHOLOGY EVALUATION - COMMENTS
all symptoms appear to have resolved, CHARLES Cotto reports that she doesn't see any slurring or having any difficulty with communication. Patient has stated to staff she does not want her family updated on her care and medical status at this time.

## 2020-10-16 NOTE — PHYSICAL THERAPY INITIAL EVALUATION ADULT - CRITERIA FOR SKILLED THERAPEUTIC INTERVENTIONS
predicted duration of therapy intervention/impairments found/functional limitations in following categories/rehab potential/therapy frequency

## 2020-10-16 NOTE — PROGRESS NOTE ADULT - ASSESSMENT
SHAUN COATES 75y Female  MRN#: [id number]   CODE STATUS:full code      SUBJECTIVE  Patient is a 75y old Female who presents with a chief complaint of slurred speech (16 Oct 2020 09:51)  Currently admitted to medicine with the primary diagnosis of Slurred speech  Today is hospital day 2d, and this morning she is resting in bed and reports no overnight events.     OBJECTIVE  PAST MEDICAL & SURGICAL HISTORY  Falls    Congestive heart failure    Transient ischemic attack    FLAVIO on CPAP    Bipolar 1 disorder    Allergic reaction    PVD (peripheral vascular disease)    Other emphysema    Other cardiomyopathy    TIA (transient ischemic attack)    COPD (chronic obstructive pulmonary disease)    Depression    Hypertension    History of cholecystectomy      ALLERGIES:  codeine (Other (Moderate))  Depakote (Unknown)  IV Contrast (Anaphylaxis)  losartan (Angioedema)  Risperdal (Other)  verapamil (Short breath; Angioedema)    MEDICATIONS:  STANDING MEDICATIONS  amLODIPine   Tablet 2.5 milliGRAM(s) Oral daily  aspirin  chewable 81 milliGRAM(s) Oral daily  atorvastatin 80 milliGRAM(s) Oral at bedtime  budesonide 160 MICROgram(s)/formoterol 4.5 MICROgram(s) Inhaler 2 Puff(s) Inhalation two times a day  chlorhexidine 4% Liquid 1 Application(s) Topical <User Schedule>  DULoxetine 30 milliGRAM(s) Oral daily  furosemide    Tablet 40 milliGRAM(s) Oral daily  gabapentin 300 milliGRAM(s) Oral three times a day  lamoTRIgine 25 milliGRAM(s) Oral two times a day  LORazepam     Tablet 0.5 milliGRAM(s) Oral two times a day  metoprolol tartrate 100 milliGRAM(s) Oral two times a day  montelukast 10 milliGRAM(s) Oral daily  pantoprazole    Tablet 40 milliGRAM(s) Oral before breakfast  QUEtiapine 200 milliGRAM(s) Oral daily  rivaroxaban 20 milliGRAM(s) Oral with dinner    PRN MEDICATIONS  guaifenesin/dextromethorphan  Syrup 10 milliLiter(s) Oral every 4 hours PRN      VITAL SIGNS: Last 24 Hours  T(C): 36.5 (16 Oct 2020 07:36), Max: 36.9 (15 Oct 2020 20:53)  T(F): 97.7 (16 Oct 2020 07:36), Max: 98.4 (15 Oct 2020 20:53)  HR: 65 (16 Oct 2020 07:36) (65 - 90)  BP: 145/65 (16 Oct 2020 07:36) (105/53 - 168/74)  BP(mean): 94 (16 Oct 2020 07:36) (76 - 106)  RR: 20 (16 Oct 2020 05:28) (18 - 20)  SpO2: 99% (16 Oct 2020 07:36) (98% - 100%)    LABS:                        12.9   6.16  )-----------( 273      ( 16 Oct 2020 05:42 )             39.8     10-16    143  |  107  |  20  ----------------------------<  105<H>  4.2   |  25  |  1.0    Ca    9.5      16 Oct 2020 05:42    TPro  6.3  /  Alb  3.9  /  TBili  0.2  /  DBili  x   /  AST  29  /  ALT  20  /  AlkPhos  141<H>  10-16    PT/INR - ( 14 Oct 2020 17:06 )   PT: 13.70 sec;   INR: 1.19 ratio         PTT - ( 14 Oct 2020 17:06 )  PTT:33.0 sec      Troponin T, Serum: 0.03 ng/mL <HH> (10-16-20 @ 05:42)  Troponin T, Serum: <0.01 ng/mL (10-15-20 @ 21:58)  Creatine Kinase, Serum: 69 U/L (10-15-20 @ 12:37)  Troponin T, Serum: 0.02 ng/mL <H> (10-15-20 @ 12:37)      CARDIAC MARKERS ( 16 Oct 2020 05:42 )  x     / 0.03 ng/mL / x     / x     / x      CARDIAC MARKERS ( 15 Oct 2020 21:58 )  x     / <0.01 ng/mL / x     / x     / x      CARDIAC MARKERS ( 15 Oct 2020 12:37 )  x     / 0.02 ng/mL / 69 U/L / x     / 8.0 ng/mL  CARDIAC MARKERS ( 15 Oct 2020 06:00 )  x     / 0.05 ng/mL / x     / x     / x      CARDIAC MARKERS ( 14 Oct 2020 17:06 )  x     / 0.01 ng/mL / 64 U/L / x     / 6.4 ng/mL      RADIOLOGY:    PHYSICAL EXAM:    ASSESSMENT & PLAN  74 yo f h/o HTN, HLD, bipolar disorder, CHF, COPD on home O2 p/w slurred speech and s/p episode of afib converted in the field now in NSR.     #AMS likley secondary to polypharmacy, r/o stroke or TIA   c/f polypharmacy since  states pt took tramadol and muscle relaxer   sees multiple providers ( pain management, psychiatry....etc   patient is on multiple pain killers benzo and SSRIs  head CT negative  no speech defecit or focal neurological defects at this time,  Plan:  MRI unremarkable   crotid doppler with > 80 % stenosis, MRA showed no stenosis  rEEG,    lipids, A1C, continue ASA/statin, speech eval, 2D echo   will limit cns depressants   needs adjustment of medications before discharge     #afib s/p cardioversion in the field  #Troponin elevation   will try to obtain EMS strips   Cardiology on consult : r/o MI and check d dimer, AC if ok with neuro   c/w metoprolol     # D/ c stephens TOv, complaining of dysuria, send UA and Ucx, start Abx if positive     #copd on home 02   O2 prn, bipap prn   continue singular     # HTN   continue lopressor, Norvasc     #CHF?  continue Lasix     #Bipolar   continue Lamictal     # Dvt ppx   # gi ppx   # OOB   # Full code

## 2020-10-16 NOTE — CHART NOTE - NSCHARTNOTEFT_GEN_A_CORE
ICU DOWNGRADE NOTE:    75y Female transferred to floor from ICU    Patient is a 75y old Female who presents with a chief complaint of slurred speech (16 Oct 2020 10:22)    The patient is currently admitted for the primary diagnosis of Slurred speech      The patient was admitted to the unit for (2) Days.    The patient was never) intubated for (days), and was never) on pressors for (days).      Urinary Catheter:  d/ameya, TOV now     Disposition: floor,    Code Status: full code     ICU COURSE OF EVENTS:  -------------------------------------------------------------------------------------------  pt is a 76 yo f h/o HTN, HLD, bipolar disorder, CHF, COPD on 4L NC at home noted to have slurred speech noted by . As per report EMS found pt without verbal response Afib, she was cardioverted in the field and arrived to ER in NSR. Stroke code called, head CT without pathology. slurred speech resolved spontaneously, no facial droop or unilateral weakness.    reports pt took tramadol and a muscle relaxer for pain.   patient is receiving multiple CNS depressants/anticholinergics/muscle relaxants/opioids from different providers, medications need to be adjusted with coordination with her PMD.   no strips confirm the afib, no afib while in CCU, will anticoagulate given high CHADSVASC, patient needs fall risk assessment before discharge since now some of her meds were held on admission, adjustment of medications is necessary at this point to prevent fall given patient is on AC now.   Stroke/ TIA ruled out   symptoms likely due to polypharmacy   needs PT evaluation  and discharge plan   Freeman catheter placed in ED, will do TOV and will send UA since pt is complaining of dysuria this AM, will follow   -------------------------------------------------------------------------------------------    Current workup in progress:    SIGN OUT AT 10-16-20 @ 11:39 GIVEN TO:

## 2020-10-16 NOTE — PROGRESS NOTE ADULT - SUBJECTIVE AND OBJECTIVE BOX
Patient is a 75y old  Female who presents with a chief complaint of slurred speech (16 Oct 2020 07:55)        HPI:  pt is a 76 yo f h/o HTN, HLD, bipolar disorder, CHF, COPD on 4L NC at home noted to have slurred speech noted by . As per report EMS found pt without verbal response Afib, she was cardioverted in the field and arrived to ER in NSR. On arrival pt awake and alert c/o generalized weakness, mild, no specific pain or radiation. Stroke code called, head CT without pathology. pt with slurred speech, no facail droop or unilateral weakness.    reports pt took tramadol and a muscle relaxer for pain.   Denies fever, chills, cp, sob,   abd pain, N/V/D dizziness, numbness, tingling.  (14 Oct 2020 22:38)      Interval Events: No overnight events.    REVIEW OF SYSTEMS:   see HPI      OBJECTIVE:  ICU Vital Signs Last 24 Hrs  T(C): 36.5 (16 Oct 2020 07:36), Max: 36.9 (15 Oct 2020 20:53)  T(F): 97.7 (16 Oct 2020 07:36), Max: 98.4 (15 Oct 2020 20:53)  HR: 65 (16 Oct 2020 07:36) (65 - 90)  BP: 145/65 (16 Oct 2020 07:36) (105/53 - 168/74)  BP(mean): 94 (16 Oct 2020 07:36) (76 - 106)  ABP: --  ABP(mean): --  RR: 20 (16 Oct 2020 05:28) (18 - 20)  SpO2: 99% (16 Oct 2020 07:36) (98% - 100%)        10-15 @ 07:01  -  10-16 @ 07:00  --------------------------------------------------------  IN: 480 mL / OUT: 3200 mL / NET: -2720 mL      CAPILLARY BLOOD GLUCOSE      POCT Blood Glucose.: 119 mg/dL (14 Oct 2020 16:57)        PHYSICAL EXAM:     · CONSTITUTIONAL:   not septic appearing,   well nourished,   NAD    · ENMT:   Airway patent,   Nasal mucosa clear.  Mouth with normal mucosa.   No thrush    · EYES:   Clear bilaterally,   pupils equal,   round and reactive to light.    · CARDIAC:   Normal rate,   regular rhythm.    Heart sounds S1, S2.   No murmurs, no rubs or gallops on auscultation  no edema        CAROTID:   normal systolic impulse  no bruits    · RESPIRATORY:   no w/r/r/,   normal chest expansion  no tachypnea,  no retractions or use of accessory muscles  palpation of chest is normal with no fremitus  percussion of chest demonstrates no hyperresonance or dullness    · GASTROINTESTINAL:  Abdomen soft,   non-tender,   + BS  liver/spleen not palpable    · MUSCULOSKELETAL:   no clubbing, cyanosis      · NEUROLOGICAL:   Alert and oriented   no obvious focal deficits in cranial nerve areas  no motor or sensory deficits.      · SKIN:   Skin normal color for race,   warm, dry   No evidence of rash.    · PSYCHIATRIC:   Alert and oriented to person,   place, time/situation.       · HEME LYMPH:   no splenomegaly.  No cervical  lymphadenopathy.  no inguinal lymphadenopathy    HOSPITAL MEDICATIONS:  MEDICATIONS  (STANDING):  amLODIPine   Tablet 2.5 milliGRAM(s) Oral daily  aspirin  chewable 81 milliGRAM(s) Oral daily  atorvastatin 80 milliGRAM(s) Oral at bedtime  budesonide 160 MICROgram(s)/formoterol 4.5 MICROgram(s) Inhaler 2 Puff(s) Inhalation two times a day  chlorhexidine 4% Liquid 1 Application(s) Topical <User Schedule>  DULoxetine 30 milliGRAM(s) Oral daily  enoxaparin Injectable 40 milliGRAM(s) SubCutaneous daily  furosemide    Tablet 40 milliGRAM(s) Oral daily  gabapentin 300 milliGRAM(s) Oral three times a day  lamoTRIgine 25 milliGRAM(s) Oral two times a day  LORazepam     Tablet 0.5 milliGRAM(s) Oral two times a day  metoprolol tartrate 100 milliGRAM(s) Oral two times a day  montelukast 10 milliGRAM(s) Oral daily  pantoprazole    Tablet 40 milliGRAM(s) Oral before breakfast  QUEtiapine 200 milliGRAM(s) Oral daily    MEDICATIONS  (PRN):  guaifenesin/dextromethorphan  Syrup 10 milliLiter(s) Oral every 4 hours PRN congestion        LABS:                        12.9   6.16  )-----------( 273      ( 16 Oct 2020 05:42 )             39.8     10-16    143  |  107  |  20  ----------------------------<  105<H>  4.2   |  25  |  1.0    Ca    9.5      16 Oct 2020 05:42    TPro  6.3  /  Alb  3.9  /  TBili  0.2  /  DBili  x   /  AST  29  /  ALT  20  /  AlkPhos  141<H>  10-16    PT/INR - ( 14 Oct 2020 17:06 )   PT: 13.70 sec;   INR: 1.19 ratio         PTT - ( 14 Oct 2020 17:06 )  PTT:33.0 sec        CARDIAC MARKERS ( 16 Oct 2020 05:42 )  x     / 0.03 ng/mL / x     / x     / x      CARDIAC MARKERS ( 15 Oct 2020 21:58 )  x     / <0.01 ng/mL / x     / x     / x      CARDIAC MARKERS ( 15 Oct 2020 12:37 )  x     / 0.02 ng/mL / 69 U/L / x     / 8.0 ng/mL  CARDIAC MARKERS ( 15 Oct 2020 06:00 )  x     / 0.05 ng/mL / x     / x     / x      CARDIAC MARKERS ( 14 Oct 2020 17:06 )  x     / 0.01 ng/mL / 64 U/L / x     / 6.4 ng/mL                RADIOLOGY: I personally reviewed latest CXR and other pertinent films.

## 2020-10-17 LAB
APPEARANCE UR: ABNORMAL
BACTERIA # UR AUTO: ABNORMAL
BILIRUB UR-MCNC: NEGATIVE — SIGNIFICANT CHANGE UP
COLOR SPEC: YELLOW — SIGNIFICANT CHANGE UP
DIFF PNL FLD: NEGATIVE — SIGNIFICANT CHANGE UP
GLUCOSE UR QL: NEGATIVE MG/DL — SIGNIFICANT CHANGE UP
KETONES UR-MCNC: NEGATIVE — SIGNIFICANT CHANGE UP
LEUKOCYTE ESTERASE UR-ACNC: ABNORMAL
NITRITE UR-MCNC: POSITIVE
PH UR: 5.5 — SIGNIFICANT CHANGE UP (ref 5–8)
PROT UR-MCNC: NEGATIVE MG/DL — SIGNIFICANT CHANGE UP
SP GR SPEC: 1.01 — SIGNIFICANT CHANGE UP (ref 1.01–1.03)
TROPONIN T SERPL-MCNC: 0.05 NG/ML — CRITICAL HIGH
UROBILINOGEN FLD QL: 0.2 MG/DL — SIGNIFICANT CHANGE UP (ref 0.2–0.2)
WBC UR QL: >50 /HPF

## 2020-10-17 PROCEDURE — 99233 SBSQ HOSP IP/OBS HIGH 50: CPT

## 2020-10-17 RX ADMIN — MONTELUKAST 10 MILLIGRAM(S): 4 TABLET, CHEWABLE ORAL at 11:24

## 2020-10-17 RX ADMIN — LAMOTRIGINE 25 MILLIGRAM(S): 25 TABLET, ORALLY DISINTEGRATING ORAL at 05:28

## 2020-10-17 RX ADMIN — BUDESONIDE AND FORMOTEROL FUMARATE DIHYDRATE 2 PUFF(S): 160; 4.5 AEROSOL RESPIRATORY (INHALATION) at 21:53

## 2020-10-17 RX ADMIN — Medication 81 MILLIGRAM(S): at 11:24

## 2020-10-17 RX ADMIN — RIVAROXABAN 20 MILLIGRAM(S): KIT at 17:12

## 2020-10-17 RX ADMIN — CHLORHEXIDINE GLUCONATE 1 APPLICATION(S): 213 SOLUTION TOPICAL at 10:22

## 2020-10-17 RX ADMIN — LAMOTRIGINE 25 MILLIGRAM(S): 25 TABLET, ORALLY DISINTEGRATING ORAL at 17:12

## 2020-10-17 RX ADMIN — Medication 0.5 MILLIGRAM(S): at 17:25

## 2020-10-17 RX ADMIN — QUETIAPINE FUMARATE 200 MILLIGRAM(S): 200 TABLET, FILM COATED ORAL at 11:24

## 2020-10-17 RX ADMIN — Medication 0.5 MILLIGRAM(S): at 05:27

## 2020-10-17 RX ADMIN — AMLODIPINE BESYLATE 2.5 MILLIGRAM(S): 2.5 TABLET ORAL at 05:28

## 2020-10-17 RX ADMIN — ATORVASTATIN CALCIUM 80 MILLIGRAM(S): 80 TABLET, FILM COATED ORAL at 21:52

## 2020-10-17 RX ADMIN — Medication 40 MILLIGRAM(S): at 05:28

## 2020-10-17 RX ADMIN — Medication 100 MILLIGRAM(S): at 05:28

## 2020-10-17 RX ADMIN — Medication 100 MILLIGRAM(S): at 17:11

## 2020-10-17 RX ADMIN — PANTOPRAZOLE SODIUM 40 MILLIGRAM(S): 20 TABLET, DELAYED RELEASE ORAL at 05:28

## 2020-10-17 NOTE — PROGRESS NOTE ADULT - ASSESSMENT
76 yo f h/o HTN, HLD, bipolar disorder, CHF, COPD on home O2 p/w slurred speech and s/p episode of afib converted in the field now in NSR.     #Slurred Speech  - TIA vs stroke vs polypharmacy vs seizure  - c/f polypharmacy since  states pt took tramadol and muscle relaxer   -head CT negative  - no speech defecit or focal neurological deficits at this time,   - MRI/MRA shows No acute stroke, 1 cm pineal cyst, Scattered form of mild microvascular ischemic change, No major vessel stenosis  - EEG ok.    - Continue with aspirin  chewable 81 milliGRAM(s) Oral daily  - Continue with atorvastatin 80 milliGRAM(s) Oral at bedtime  - TTE pending  - Neurology and vascular to follow up   PT/OT     #afib s/p cardioversion in the field  continue with BB, metoprolol tartrate 100 milliGRAM(s) Oral two times a day  -Anticoagulation as per cardiology, rivaroxaban 20 milliGRAM(s)      #copd on home 02   - O2 prn, bipap prn   - continue singular   - budesonide 160 MICROgram(s)/formoterol 4.5 MICROgram(s) Inhaler 2 Puff(s) Inhalation two times a day    # HTN   - Continue with amLODIPine   Tablet 2.5 milliGRAM(s) Oral daily  - Continue with metoprolol tartrate 100 milliGRAM(s) Oral two times a day    #CHF   - continue furosemide    Tablet 40 milliGRAM(s) Oral daily    #Bipolar   - continue with QUEtiapine 200 milliGRAM(s) Oral daily    Dvt ppx, on xarelto  gi ppx, pantoprazole    Tablet 40 milliGRAM(s) Oral before breakfast  OOB   Full code     #Progress Note Handoff  Pending (specify):  PT eval  Family discussion:  plan of care was discussed with patient  in details.  all questions were answered.  seems to understand, and in agreement  Disposition:  unknown

## 2020-10-17 NOTE — PROGRESS NOTE ADULT - ASSESSMENT
Patient was confused last night . Better now. Tolerating beta. No afib. Ambulate with o2. Adjust non cardiac meds Detail Level: Simple Initiate Treatment: HC 2.5% cream as directed Otc Regimen: Crystal deodorant

## 2020-10-17 NOTE — PROGRESS NOTE ADULT - SUBJECTIVE AND OBJECTIVE BOX
Patient is a 75y old  Female who presents with a chief complaint of slurred speech (16 Oct 2020 10:22)      T(F): 96.8 (10-17-20 @ 05:21), Max: 97.7 (10-16-20 @ 07:36)  HR: 87 (10-17-20 @ 05:21)  BP: 134/63 (10-17-20 @ 05:21)  RR: 19 (10-16-20 @ 19:31)  SpO2: 98% (10-17-20 @ 05:21) (98% - 99%)    PHYSICAL EXAM:  GENERAL: NAD, well-groomed, well-developed  HEAD:  Atraumatic, Normocephalic  EYES: EOMI, PERRLA, conjunctiva and sclera clear  ENMT: No tonsillar erythema, exudates, or enlargement; Moist mucous membranes, Good dentition, No lesions  NECK: Supple, No JVD, Normal thyroid  NERVOUS SYSTEM:  Alert & Oriented X3,  Motor Strength 5/5 B/L upper and lower extremities  CHEST/LUNG: Clear to percussion bilaterally; No rales, rhonchi, wheezing, or rubs  HEART: Regular rate and rhythm; No murmurs, rubs, or gallops  ABDOMEN: Soft, Nontender, Nondistended; Bowel sounds present  EXTREMITIES:   No clubbing, cyanosis, or edema  LYMPH: No lymphadenopathy noted  SKIN: No rashes or lesions    labs  10-16    143  |  107  |  20  ----------------------------<  105<H>  4.2   |  25  |  1.0    Ca    9.5      16 Oct 2020 05:42    TPro  6.3  /  Alb  3.9  /  TBili  0.2  /  DBili  x   /  AST  29  /  ALT  20  /  AlkPhos  141<H>  10-16                          12.9   6.16  )-----------( 273      ( 16 Oct 2020 05:42 )             39.8               amLODIPine   Tablet 2.5 milliGRAM(s) Oral daily  aspirin  chewable 81 milliGRAM(s) Oral daily  atorvastatin 80 milliGRAM(s) Oral at bedtime  budesonide 160 MICROgram(s)/formoterol 4.5 MICROgram(s) Inhaler 2 Puff(s) Inhalation two times a day  chlorhexidine 4% Liquid 1 Application(s) Topical <User Schedule>  furosemide    Tablet 40 milliGRAM(s) Oral daily  gabapentin 300 milliGRAM(s) Oral three times a day  guaifenesin/dextromethorphan  Syrup 10 milliLiter(s) Oral every 4 hours PRN  lamoTRIgine 25 milliGRAM(s) Oral two times a day  LORazepam     Tablet 0.5 milliGRAM(s) Oral two times a day  metoprolol tartrate 100 milliGRAM(s) Oral two times a day  montelukast 10 milliGRAM(s) Oral daily  pantoprazole    Tablet 40 milliGRAM(s) Oral before breakfast  QUEtiapine 200 milliGRAM(s) Oral daily  rivaroxaban 20 milliGRAM(s) Oral with dinner

## 2020-10-17 NOTE — PROGRESS NOTE ADULT - SUBJECTIVE AND OBJECTIVE BOX
SHAUN COATES  75y  Female      Patient is a 75y old  Female who presents with a chief complaint of slurred speech (17 Oct 2020 06:38)      INTERVAL HPI/OVERNIGHT EVENTS: no acute events overnight. no fevers, chills, n/v, chest pain, sob, abdominal pain or changes in BM or urination.      VITALS  T(C): 35.9 (10-17-20 @ 15:00), Max: 36 (10-17-20 @ 05:21)  HR: 77 (10-17-20 @ 13:13) (69 - 88)  BP: 128/59 (10-17-20 @ 07:19) (95/51 - 134/63)  RR: 18 (10-17-20 @ 15:00) (18 - 19)  SpO2: 96% (10-17-20 @ 15:00) (96% - 99%)  Wt(kg): --Vital Signs Last 24 Hrs  T(C): 35.9 (17 Oct 2020 15:00), Max: 36 (17 Oct 2020 05:21)  T(F): 96.7 (17 Oct 2020 15:00), Max: 96.8 (17 Oct 2020 05:21)  HR: 77 (17 Oct 2020 13:13) (69 - 88)  BP: 128/59 (17 Oct 2020 07:19) (95/51 - 134/63)  BP(mean): 85 (17 Oct 2020 07:19) (70 - 90)  RR: 18 (17 Oct 2020 15:00) (18 - 19)  SpO2: 96% (17 Oct 2020 15:00) (96% - 99%)      10-16-20 @ 07:01  -  10-17-20 @ 07:00  --------------------------------------------------------  IN: 1000 mL / OUT: 1300 mL / NET: -300 mL    10-17-20 @ 07:01  -  10-17-20 @ 15:51  --------------------------------------------------------  IN: 380 mL / OUT: 800 mL / NET: -420 mL        PHYSICAL EXAM:  GENERAL: NAD, well-groomed, well-developed  PSYCH: no agitation, baseline mentation  NERVOUS SYSTEM:  Alert & Oriented X3, Motor Strength 5/5 B/L upper and lower extremities; Sensory intact; FTN WNL  PULMONARY: Clear to percussion bilaterally; No rales, rhonchi, wheezing, or rubs  CARDIOVASCULAR: Regular rate and rhythm; No murmurs, rubs, or gallops  GI: Soft, Nontender, Nondistended; Bowel sounds present  EXTREMITIES:  2+ Peripheral Pulses, No clubbing, cyanosis, or edema  LYMPH: No lymphadenopathy noted  SKIN: No rashes or lesions    Consultant(s) Notes Reviewed:  [x ] YES  [ ] NO    Discussed with Consultants/Other Providers [ x] YES     LABS                          12.9   6.16  )-----------( 273      ( 16 Oct 2020 05:42 )             39.8     10-16    143  |  107  |  20  ----------------------------<  105<H>  4.2   |  25  |  1.0    Ca    9.5      16 Oct 2020 05:42    TPro  6.3  /  Alb  3.9  /  TBili  0.2  /  DBili  x   /  AST  29  /  ALT  20  /  AlkPhos  141<H>  10-16      Urinalysis Basic - ( 17 Oct 2020 07:30 )    Color: Yellow / Appearance: Slightly Cloudy / S.015 / pH: x  Gluc: x / Ketone: Negative  / Bili: Negative / Urobili: 0.2 mg/dL   Blood: x / Protein: Negative mg/dL / Nitrite: Positive   Leuk Esterase: Moderate / RBC: x / WBC >50 /HPF   Sq Epi: x / Non Sq Epi: x / Bacteria: Many      Lactate Trend  10-14 @ 17:06 Lactate:2.1     CARDIAC MARKERS ( 17 Oct 2020 06:20 )  x     / 0.05 ng/mL / x     / x     / x      CARDIAC MARKERS ( 16 Oct 2020 05:42 )  x     / 0.03 ng/mL / x     / x     / x      CARDIAC MARKERS ( 15 Oct 2020 21:58 )  x     / <0.01 ng/mL / x     / x     / x          RADIOLOGY & ADDITIONAL TESTS:  -no images 10/17    Imaging Personally Reviewed:  [ ] YES  [ ] NO    HEALTH ISSUES - PROBLEM Dx:  Slurred speech      MEDICATIONS  (STANDING):  amLODIPine   Tablet 2.5 milliGRAM(s) Oral daily  aspirin  chewable 81 milliGRAM(s) Oral daily  atorvastatin 80 milliGRAM(s) Oral at bedtime  budesonide 160 MICROgram(s)/formoterol 4.5 MICROgram(s) Inhaler 2 Puff(s) Inhalation two times a day  chlorhexidine 4% Liquid 1 Application(s) Topical <User Schedule>  furosemide    Tablet 40 milliGRAM(s) Oral daily  gabapentin 300 milliGRAM(s) Oral three times a day  lamoTRIgine 25 milliGRAM(s) Oral two times a day  LORazepam     Tablet 0.5 milliGRAM(s) Oral two times a day  metoprolol tartrate 100 milliGRAM(s) Oral two times a day  montelukast 10 milliGRAM(s) Oral daily  pantoprazole    Tablet 40 milliGRAM(s) Oral before breakfast  QUEtiapine 200 milliGRAM(s) Oral daily  rivaroxaban 20 milliGRAM(s) Oral with dinner    MEDICATIONS  (PRN):  guaifenesin/dextromethorphan  Syrup 10 milliLiter(s) Oral every 4 hours PRN congestion

## 2020-10-17 NOTE — PROGRESS NOTE ADULT - REASON FOR ADMISSION
slurred speech

## 2020-10-18 ENCOUNTER — TRANSCRIPTION ENCOUNTER (OUTPATIENT)
Age: 75
End: 2020-10-18

## 2020-10-18 VITALS
DIASTOLIC BLOOD PRESSURE: 64 MMHG | SYSTOLIC BLOOD PRESSURE: 138 MMHG | HEART RATE: 72 BPM | RESPIRATION RATE: 18 BRPM | TEMPERATURE: 97 F

## 2020-10-18 LAB
ANION GAP SERPL CALC-SCNC: 10 MMOL/L — SIGNIFICANT CHANGE UP (ref 7–14)
BUN SERPL-MCNC: 32 MG/DL — HIGH (ref 10–20)
CALCIUM SERPL-MCNC: 9.5 MG/DL — SIGNIFICANT CHANGE UP (ref 8.5–10.1)
CHLORIDE SERPL-SCNC: 106 MMOL/L — SIGNIFICANT CHANGE UP (ref 98–110)
CO2 SERPL-SCNC: 26 MMOL/L — SIGNIFICANT CHANGE UP (ref 17–32)
CREAT SERPL-MCNC: 1.1 MG/DL — SIGNIFICANT CHANGE UP (ref 0.7–1.5)
GLUCOSE SERPL-MCNC: 84 MG/DL — SIGNIFICANT CHANGE UP (ref 70–99)
HCT VFR BLD CALC: 39.9 % — SIGNIFICANT CHANGE UP (ref 37–47)
HGB BLD-MCNC: 13.2 G/DL — SIGNIFICANT CHANGE UP (ref 12–16)
MCHC RBC-ENTMCNC: 31.8 PG — HIGH (ref 27–31)
MCHC RBC-ENTMCNC: 33.1 G/DL — SIGNIFICANT CHANGE UP (ref 32–37)
MCV RBC AUTO: 96.1 FL — SIGNIFICANT CHANGE UP (ref 81–99)
NRBC # BLD: 0 /100 WBCS — SIGNIFICANT CHANGE UP (ref 0–0)
PLATELET # BLD AUTO: 248 K/UL — SIGNIFICANT CHANGE UP (ref 130–400)
POTASSIUM SERPL-MCNC: 3.9 MMOL/L — SIGNIFICANT CHANGE UP (ref 3.5–5)
POTASSIUM SERPL-SCNC: 3.9 MMOL/L — SIGNIFICANT CHANGE UP (ref 3.5–5)
RBC # BLD: 4.15 M/UL — LOW (ref 4.2–5.4)
RBC # FLD: 14 % — SIGNIFICANT CHANGE UP (ref 11.5–14.5)
SODIUM SERPL-SCNC: 142 MMOL/L — SIGNIFICANT CHANGE UP (ref 135–146)
WBC # BLD: 6.36 K/UL — SIGNIFICANT CHANGE UP (ref 4.8–10.8)
WBC # FLD AUTO: 6.36 K/UL — SIGNIFICANT CHANGE UP (ref 4.8–10.8)

## 2020-10-18 PROCEDURE — 99239 HOSP IP/OBS DSCHRG MGMT >30: CPT

## 2020-10-18 RX ORDER — CHLORZOXAZONE 250 MG
0 TABLET ORAL
Qty: 0 | Refills: 0 | DISCHARGE

## 2020-10-18 RX ORDER — QUETIAPINE FUMARATE 200 MG/1
1 TABLET, FILM COATED ORAL
Qty: 0 | Refills: 0 | DISCHARGE
Start: 2020-10-18

## 2020-10-18 RX ORDER — DABIGATRAN ETEXILATE MESYLATE 150 MG/1
1 CAPSULE ORAL
Qty: 60 | Refills: 0
Start: 2020-10-18 | End: 2020-11-16

## 2020-10-18 RX ORDER — QUETIAPINE FUMARATE 200 MG/1
0 TABLET, FILM COATED ORAL
Qty: 0 | Refills: 0 | DISCHARGE

## 2020-10-18 RX ORDER — AMITRIPTYLINE HCL 25 MG
1 TABLET ORAL
Qty: 0 | Refills: 0 | DISCHARGE

## 2020-10-18 RX ORDER — FUROSEMIDE 40 MG
1 TABLET ORAL
Qty: 0 | Refills: 0 | DISCHARGE
Start: 2020-10-18

## 2020-10-18 RX ORDER — APIXABAN 2.5 MG/1
1 TABLET, FILM COATED ORAL
Qty: 60 | Refills: 0
Start: 2020-10-18 | End: 2020-11-16

## 2020-10-18 RX ORDER — RIVAROXABAN 15 MG-20MG
1 KIT ORAL
Qty: 30 | Refills: 0
Start: 2020-10-18 | End: 2020-11-16

## 2020-10-18 RX ADMIN — QUETIAPINE FUMARATE 200 MILLIGRAM(S): 200 TABLET, FILM COATED ORAL at 11:29

## 2020-10-18 RX ADMIN — Medication 0.5 MILLIGRAM(S): at 05:47

## 2020-10-18 RX ADMIN — MONTELUKAST 10 MILLIGRAM(S): 4 TABLET, CHEWABLE ORAL at 11:29

## 2020-10-18 RX ADMIN — Medication 81 MILLIGRAM(S): at 11:29

## 2020-10-18 RX ADMIN — Medication 100 MILLIGRAM(S): at 05:47

## 2020-10-18 RX ADMIN — Medication 40 MILLIGRAM(S): at 05:47

## 2020-10-18 RX ADMIN — LAMOTRIGINE 25 MILLIGRAM(S): 25 TABLET, ORALLY DISINTEGRATING ORAL at 05:47

## 2020-10-18 RX ADMIN — PANTOPRAZOLE SODIUM 40 MILLIGRAM(S): 20 TABLET, DELAYED RELEASE ORAL at 06:16

## 2020-10-18 RX ADMIN — CHLORHEXIDINE GLUCONATE 1 APPLICATION(S): 213 SOLUTION TOPICAL at 08:49

## 2020-10-18 RX ADMIN — RIVAROXABAN 20 MILLIGRAM(S): KIT at 13:52

## 2020-10-18 RX ADMIN — AMLODIPINE BESYLATE 2.5 MILLIGRAM(S): 2.5 TABLET ORAL at 05:47

## 2020-10-18 RX ADMIN — BUDESONIDE AND FORMOTEROL FUMARATE DIHYDRATE 2 PUFF(S): 160; 4.5 AEROSOL RESPIRATORY (INHALATION) at 08:49

## 2020-10-18 NOTE — DISCHARGE NOTE PROVIDER - NSDCCPCAREPLAN_GEN_ALL_CORE_FT
PRINCIPAL DISCHARGE DIAGNOSIS  Diagnosis: Slurred speech  Assessment and Plan of Treatment: You came in with slurred speech.  You were worked up for a stroke but the workup was negative.  Your symptoms were likely a consequence of all the medications that you take.  They have since been adjusted,      SECONDARY DISCHARGE DIAGNOSES  Diagnosis: Polypharmacy  Assessment and Plan of Treatment: Make sure to follow up with your PCP and show them a list of your medications to see if a further adjustment is warranted    Diagnosis: Atrial fibrillation  Assessment and Plan of Treatment: Continue with your metoprolol.  You were started on a blood thinner called Xarelto.  Take this one tablet daily.  Make sure to follow up with your cardiologist outpatient.

## 2020-10-18 NOTE — DISCHARGE NOTE NURSING/CASE MANAGEMENT/SOCIAL WORK - NSDCPEPRADAXA_GEN_ALL_CORE
Dabigatran/Pradaxa - Follow up monitoring/Dabigatran/Pradaxa - Potential for adverse drug reactions and interactions/Dabigatran/Pradaxa - Dietary Advice/Dabigatran/Pradaxa - Compliance

## 2020-10-18 NOTE — DISCHARGE NOTE NURSING/CASE MANAGEMENT/SOCIAL WORK - NSDCVIVACCINE_GEN_ALL_CORE_FT
Influenza , 2019/10/3 11:19 , Mona Martinez (RN)  Tdap , 2018/10/13 09:00 , Shanell Davis (CHARLES)

## 2020-10-18 NOTE — DISCHARGE NOTE PROVIDER - CARE PROVIDER_API CALL
Milad Aguilar  FAMILY MEDICINE  [address] [place] [address]  Phone: [phone]  Fax: [phone]  Follow Up Time:     Juno Olmos  CARDIOVASCULAR DISEASE  [address] [place] [address]  Phone: [phone]  Fax: [phone]  Follow Up Time:

## 2020-10-18 NOTE — DISCHARGE NOTE NURSING/CASE MANAGEMENT/SOCIAL WORK - PATIENT PORTAL LINK FT
You can access the FollowMyHealth Patient Portal offered by [facility] by registering at the following website: [email or web address]. By joining [facility]’s FollowMyHealth portal, you will also be able to view your health information using other applications (apps) compatible with our system.

## 2020-10-18 NOTE — DISCHARGE NOTE PROVIDER - NSDCFUSCHEDAPPT_GEN_ALL_CORE_FT
SHAUN COATES ; 12/15/2020 ; [facility] [address] SHAUN Soriano ; 12/15/2020 ; [facility] PreAdmits

## 2020-10-18 NOTE — DISCHARGE NOTE PROVIDER - HOSPITAL COURSE
76 yo f h/o HTN, HLD, bipolar disorder, CHF, COPD on home O2 p/w slurred speech and s/p episode of afib converted in the field now in NSR.     #Slurred Speech  - TIA vs stroke vs polypharmacy vs seizure  - c/f polypharmacy since  states pt took tramadol and muscle relaxer   - head CT negative  - no speech defecit or focal neurological deficits at this time,   - MRI/MRA shows No acute stroke, 1 cm pineal cyst, Scattered form of mild microvascular ischemic change, No major vessel stenosis  - EEG ok.    - Continue with aspirin  chewable 81 milliGRAM(s) Oral daily  - Continue with atorvastatin 80 milliGRAM(s) Oral at bedtime  - TTE pending  - Neurology and vascular to follow up   PT/OT     #afib s/p cardioversion in the field  continue with BB, metoprolol tartrate 100 milliGRAM(s) Oral two times a day  -Anticoagulation as per cardiology, rivaroxaban 20 milliGRAM(s)      #copd on home 02   - O2 prn, bipap prn   - continue singular   - budesonide 160 MICROgram(s)/formoterol 4.5 MICROgram(s) Inhaler 2 Puff(s) Inhalation two times a day    # HTN   - Continue with amLODIPine   Tablet 2.5 milliGRAM(s) Oral daily  - Continue with metoprolol tartrate 100 milliGRAM(s) Oral two times a day    #CHF   - continue furosemide    Tablet 40 milliGRAM(s) Oral daily    #Bipolar   - continue with QUEtiapine 200 milliGRAM(s) Oral daily   76 yo f h/o HTN, HLD, bipolar disorder, CHF, COPD on home O2 p/w slurred speech and s/p episode of afib converted in the field now in NSR.     #Slurred Speech  - TIA vs stroke vs polypharmacy vs seizure  - c/f polypharmacy since  states pt took tramadol and muscle relaxer   - head CT negative  - no speech defecit or focal neurological deficits at this time,   - MRI/MRA shows No acute stroke, 1 cm pineal cyst, Scattered form of mild microvascular ischemic change, No major vessel stenosis  - EEG ok.    - Continue with aspirin  chewable 81 milliGRAM(s) Oral daily  - Continue with atorvastatin 80 milliGRAM(s) Oral at bedtime  PT/OT     #afib s/p cardioversion in the field  continue with BB, metoprolol tartrate 100 milliGRAM(s) Oral two times a day  -Anticoagulation as per cardiology, rivaroxaban 20 milliGRAM(s)      #copd on home 02   - O2 prn, bipap prn   - continue singular   - budesonide 160 MICROgram(s)/formoterol 4.5 MICROgram(s) Inhaler 2 Puff(s) Inhalation two times a day    # HTN   - Continue with amLODIPine   Tablet 2.5 milliGRAM(s) Oral daily  - Continue with metoprolol tartrate 100 milliGRAM(s) Oral two times a day    #CHF   - continued furosemide    Tablet 40 milliGRAM(s) Oral daily    #Bipolar   - continued with QUEtiapine 200 milliGRAM(s) Oral daily    ``` 74 yo f h/o HTN, HLD, bipolar disorder, CHF, COPD on home O2 p/w slurred speech and s/p episode of afib converted in the field now in NSR.     #Slurred Speech  - TIA vs stroke vs polypharmacy vs seizure  - c/f polypharmacy since  states pt took tramadol and muscle relaxer   - head CT negative  - no speech defecit or focal neurological deficits at this time,   - MRI/MRA shows No acute stroke, 1 cm pineal cyst, Scattered form of mild microvascular ischemic change, No major vessel stenosis  - EEG ok.    - Continue with aspirin  chewable 81 milliGRAM(s) Oral daily  - Continue with atorvastatin 80 milliGRAM(s) Oral at bedtime  PT/OT     #afib s/p cardioversion in the field  continue with BB, metoprolol tartrate 100 milliGRAM(s) Oral two times a day  -Anticoagulation as per cardiology, rivaroxaban 20 milliGRAM(s)      #copd on home 02   - O2 prn, bipap prn   - continue singular   - budesonide 160 MICROgram(s)/formoterol 4.5 MICROgram(s) Inhaler 2 Puff(s) Inhalation two times a day    # HTN   - Continue with amLODIPine   Tablet 2.5 milliGRAM(s) Oral daily  - Continue with metoprolol tartrate 100 milliGRAM(s) Oral two times a day    #CHF   - continued furosemide    Tablet 40 milliGRAM(s) Oral daily    #Bipolar   - continued with QUEtiapine 200 milliGRAM(s) Oral daily    Vital Signs Last 24 Hrs  T(C): 36 (18 Oct 2020 06:17), Max: 36.1 (17 Oct 2020 16:20)  T(F): 96.8 (18 Oct 2020 06:17), Max: 97 (17 Oct 2020 16:20)  HR: 72 (18 Oct 2020 06:17) (72 - 76)  BP: 138/64 (18 Oct 2020 06:17) (128/61 - 138/64)  BP(mean): --  RR: 18 (18 Oct 2020 06:17) (18 - 18)  SpO2: 99% (18 Oct 2020 01:33) (99% - 99%)    Physical Exam  Gen- elderly F, NAD, non toxic  HEENT- NCAT, anicteric sclera, non injected conjunctiva, EOMI  Chest- symmetrical chest rise, CTAB, no wheezing, or coarse breath sounds  Cardiac- RRR, normal s1s2 appreciated, no RMG  Abdomen- soft, non distended, non ttp, nabs+  Ext- no clubbing or cyanosis  Skin- intact, warm to touch  Neuro- Aox3, normal mentation, CN 2-12 grossly intact, spontaneously moving all 4 ext 76 yo f h/o HTN, HLD, bipolar disorder, CHF, COPD on home O2 p/w slurred speech and s/p episode of afib converted in the field now in NSR.     #Slurred Speech  - TIA vs stroke vs polypharmacy vs seizure  - c/f polypharmacy since  states pt took tramadol and muscle relaxer   - head CT negative  - no speech defecit or focal neurological deficits at this time,   - MRI/MRA shows No acute stroke, 1 cm pineal cyst, Scattered form of mild microvascular ischemic change, No major vessel stenosis  - EEG ok.    - Continue with aspirin  chewable 81 milliGRAM(s) Oral daily  - Continue with atorvastatin 80 milliGRAM(s) Oral at bedtime  PT/OT     #afib s/p cardioversion in the field  # New Onset Afib/ Paroxysmal Afib   continue with BB, metoprolol tartrate 100 milliGRAM(s) Oral two times a day  -Anticoagulation as per cardiology, rivaroxaban 20 milliGRAM(s)      #copd  c/b chronic respiratory failure on home 02   - O2 prn, bipap prn   - continue singular   - budesonide 160 MICROgram(s)/formoterol 4.5 MICROgram(s) Inhaler 2 Puff(s) Inhalation two times a day    # HTN   - Continue with amLODIPine   Tablet 2.5 milliGRAM(s) Oral daily  - Continue with metoprolol tartrate 100 milliGRAM(s) Oral two times a day    #Chronic Unspecified CHF   - no available echocardiogram   - continued furosemide    Tablet 40 milliGRAM(s) Oral daily    #Bipolar   - continued with QUEtiapine 200 milliGRAM(s) Oral daily    Vital Signs Last 24 Hrs  T(C): 36 (18 Oct 2020 06:17), Max: 36.1 (17 Oct 2020 16:20)  T(F): 96.8 (18 Oct 2020 06:17), Max: 97 (17 Oct 2020 16:20)  HR: 72 (18 Oct 2020 06:17) (72 - 76)  BP: 138/64 (18 Oct 2020 06:17) (128/61 - 138/64)  BP(mean): --  RR: 18 (18 Oct 2020 06:17) (18 - 18)  SpO2: 99% (18 Oct 2020 01:33) (99% - 99%)    Physical Exam  Gen- elderly F, NAD, non toxic  HEENT- NCAT, anicteric sclera, non injected conjunctiva, EOMI  Chest- symmetrical chest rise, CTAB, no wheezing, or coarse breath sounds  Cardiac- RRR, normal s1s2 appreciated, no RMG  Abdomen- soft, non distended, non ttp, nabs+  Ext- no clubbing or cyanosis  Skin- intact, warm to touch  Neuro- Aox3, normal mentation, CN 2-12 grossly intact, spontaneously moving all 4 ext

## 2020-10-18 NOTE — CHART NOTE - NSCHARTNOTEFT_GEN_A_CORE
RN notes recent positive urinalaysis though patient now is afebrile. Will defer decision to treat to day staff

## 2020-10-18 NOTE — DISCHARGE NOTE PROVIDER - NSDCMRMEDTOKEN_GEN_ALL_CORE_FT
amLODIPine 2.5 mg oral tablet: 1 tab(s) orally once a day  aspirin 81 mg oral tablet: 1 tab(s) orally once a day  Ativan 0.5 mg oral tablet: orally 2 times a day  atorvastatin 20 mg oral tablet: 1 tab(s) orally once a day  budesonide-formoterol 160 mcg-4.5 mcg/inh inhalation aerosol: 2 puff(s) inhaled 2 times a day  furosemide 40 mg oral tablet: 1 tab(s) orally once a day  gabapentin 300 mg oral capsule: 1 cap(s) orally 3 times a day  lamoTRIgine 25 mg oral tablet, chewable dispersible: 1 tab(s) orally 2 times a day  Metoprolol Tartrate 100 mg oral tablet: 1 tab(s) orally 2 times a day  montelukast 10 mg oral tablet: 1 tab(s) orally once a day  Pepcid 40 mg oral tablet: 1 tab(s) orally once a day (at bedtime)  QUEtiapine 200 mg oral tablet: 1 tab(s) orally once a day  Xarelto 20 mg oral tablet: 1 tab(s) orally once a day (at bedtime)    amLODIPine 2.5 mg oral tablet: 1 tab(s) orally once a day  aspirin 81 mg oral tablet: 1 tab(s) orally once a day  Ativan 0.5 mg oral tablet: orally 2 times a day  atorvastatin 20 mg oral tablet: 1 tab(s) orally once a day  budesonide-formoterol 160 mcg-4.5 mcg/inh inhalation aerosol: 2 puff(s) inhaled 2 times a day  Eliquis 5 mg oral tablet: 1 tab(s) orally 2 times a day   furosemide 40 mg oral tablet: 1 tab(s) orally once a day  gabapentin 300 mg oral capsule: 1 cap(s) orally 3 times a day  lamoTRIgine 25 mg oral tablet, chewable dispersible: 1 tab(s) orally 2 times a day  Metoprolol Tartrate 100 mg oral tablet: 1 tab(s) orally 2 times a day  montelukast 10 mg oral tablet: 1 tab(s) orally once a day  Pepcid 40 mg oral tablet: 1 tab(s) orally once a day (at bedtime)  QUEtiapine 200 mg oral tablet: 1 tab(s) orally once a day

## 2020-10-19 NOTE — CONSULT NOTE ADULT - CONSULT REASON
Patient ID: Juanito is a 12 
reversed the symptoms    Plan:  Using Symbicort BID  No change in rescue  RTC 6 months    Iliana Tapia MD  Pediatric Pulmonology/Allergy Immunology  Director [facility]  Director [facility]  [facility]  
dyspnea
ICU approval

## 2020-11-12 ENCOUNTER — APPOINTMENT (OUTPATIENT)
Dept: VASCULAR SURGERY | Facility: CLINIC | Age: 75
End: 2020-11-12
Payer: MEDICARE

## 2020-11-12 VITALS — DIASTOLIC BLOOD PRESSURE: 65 MMHG | SYSTOLIC BLOOD PRESSURE: 149 MMHG | BODY MASS INDEX: 34.57 KG/M2 | WEIGHT: 189 LBS

## 2020-11-12 VITALS — TEMPERATURE: 97.1 F

## 2020-11-12 PROCEDURE — 99213 OFFICE O/P EST LOW 20 MIN: CPT

## 2020-11-12 NOTE — HISTORY OF PRESENT ILLNESS
[FreeTextEntry1] : 74 y/o female with h/o COPD on home oxygen, CHF, admitted to [facility] for TIA, symptoms of slurred speech, that has now resolved. Work up revealed left ICA high-grade stenosis noted on carotid duplex. -Holding home HTN will continue pending patient improvement

## 2020-11-12 NOTE — ASSESSMENT
[FreeTextEntry1] : 76 y/o female with h/o COPD on home oxygen, CHF, admitted to [facility] for TIA, symptoms of slurred speech, that has now resolved. Work up revealed left ICA high-grade stenosis noted on carotid duplex.\par \par I have discussed revascularization of the left ICA for stroke prevention, with either left carotid endarterectomy or left carotid artery angioplasty and stent.\par \par She is in agreement to proceed with Left TCAR and I am prescribing her Plavix 75 mg daily. She is scheduled for the procedure on 12/15/2020 and will require medical clearance and COVID testing prior.\par

## 2020-11-29 ENCOUNTER — INPATIENT (INPATIENT)
Facility: HOSPITAL | Age: 75
LOS: 13 days | Discharge: HOME HEALTH | End: 2020-12-13
Attending: INTERNAL MEDICINE | Admitting: INTERNAL MEDICINE
Payer: MEDICARE

## 2020-11-29 VITALS
SYSTOLIC BLOOD PRESSURE: 184 MMHG | OXYGEN SATURATION: 98 % | RESPIRATION RATE: 18 BRPM | HEART RATE: 78 BPM | DIASTOLIC BLOOD PRESSURE: 84 MMHG | TEMPERATURE: 98 F | HEIGHT: 62 IN

## 2020-11-29 DIAGNOSIS — I95.81 POSTPROCEDURAL HYPOTENSION: ICD-10-CM

## 2020-11-29 DIAGNOSIS — Z87.891 PERSONAL HISTORY OF NICOTINE DEPENDENCE: ICD-10-CM

## 2020-11-29 DIAGNOSIS — Z79.82 LONG TERM (CURRENT) USE OF ASPIRIN: ICD-10-CM

## 2020-11-29 DIAGNOSIS — I25.10 ATHEROSCLEROTIC HEART DISEASE OF NATIVE CORONARY ARTERY WITHOUT ANGINA PECTORIS: ICD-10-CM

## 2020-11-29 DIAGNOSIS — Z79.01 LONG TERM (CURRENT) USE OF ANTICOAGULANTS: ICD-10-CM

## 2020-11-29 DIAGNOSIS — I50.32 CHRONIC DIASTOLIC (CONGESTIVE) HEART FAILURE: ICD-10-CM

## 2020-11-29 DIAGNOSIS — R07.9 CHEST PAIN, UNSPECIFIED: ICD-10-CM

## 2020-11-29 DIAGNOSIS — Z79.02 LONG TERM (CURRENT) USE OF ANTITHROMBOTICS/ANTIPLATELETS: ICD-10-CM

## 2020-11-29 DIAGNOSIS — Z86.73 PERSONAL HISTORY OF TRANSIENT ISCHEMIC ATTACK (TIA), AND CEREBRAL INFARCTION WITHOUT RESIDUAL DEFICITS: ICD-10-CM

## 2020-11-29 DIAGNOSIS — I65.22 OCCLUSION AND STENOSIS OF LEFT CAROTID ARTERY: ICD-10-CM

## 2020-11-29 DIAGNOSIS — I48.0 PAROXYSMAL ATRIAL FIBRILLATION: ICD-10-CM

## 2020-11-29 DIAGNOSIS — Z90.49 ACQUIRED ABSENCE OF OTHER SPECIFIED PARTS OF DIGESTIVE TRACT: Chronic | ICD-10-CM

## 2020-11-29 DIAGNOSIS — Z88.8 ALLERGY STATUS TO OTHER DRUGS, MEDICAMENTS AND BIOLOGICAL SUBSTANCES: ICD-10-CM

## 2020-11-29 DIAGNOSIS — J43.9 EMPHYSEMA, UNSPECIFIED: ICD-10-CM

## 2020-11-29 DIAGNOSIS — I11.0 HYPERTENSIVE HEART DISEASE WITH HEART FAILURE: ICD-10-CM

## 2020-11-29 DIAGNOSIS — U07.1 COVID-19: ICD-10-CM

## 2020-11-29 DIAGNOSIS — Z91.041 RADIOGRAPHIC DYE ALLERGY STATUS: ICD-10-CM

## 2020-11-29 DIAGNOSIS — E78.5 HYPERLIPIDEMIA, UNSPECIFIED: ICD-10-CM

## 2020-11-29 DIAGNOSIS — I42.8 OTHER CARDIOMYOPATHIES: ICD-10-CM

## 2020-11-29 DIAGNOSIS — G93.49 OTHER ENCEPHALOPATHY: ICD-10-CM

## 2020-11-29 DIAGNOSIS — J12.89 OTHER VIRAL PNEUMONIA: ICD-10-CM

## 2020-11-29 DIAGNOSIS — F31.9 BIPOLAR DISORDER, UNSPECIFIED: ICD-10-CM

## 2020-11-29 DIAGNOSIS — E66.9 OBESITY, UNSPECIFIED: ICD-10-CM

## 2020-11-29 DIAGNOSIS — G47.33 OBSTRUCTIVE SLEEP APNEA (ADULT) (PEDIATRIC): ICD-10-CM

## 2020-11-29 DIAGNOSIS — R47.01 APHASIA: ICD-10-CM

## 2020-11-29 DIAGNOSIS — D11.0 BENIGN NEOPLASM OF PAROTID GLAND: ICD-10-CM

## 2020-11-29 DIAGNOSIS — I73.9 PERIPHERAL VASCULAR DISEASE, UNSPECIFIED: ICD-10-CM

## 2020-11-29 DIAGNOSIS — E87.6 HYPOKALEMIA: ICD-10-CM

## 2020-11-29 LAB
ALBUMIN SERPL ELPH-MCNC: 4.3 G/DL — SIGNIFICANT CHANGE UP (ref 3.5–5.2)
ALP SERPL-CCNC: 232 U/L — HIGH (ref 30–115)
ALT FLD-CCNC: 32 U/L — SIGNIFICANT CHANGE UP (ref 0–41)
ANION GAP SERPL CALC-SCNC: 13 MMOL/L — SIGNIFICANT CHANGE UP (ref 7–14)
AST SERPL-CCNC: 51 U/L — HIGH (ref 0–41)
BASOPHILS # BLD AUTO: 0.07 K/UL — SIGNIFICANT CHANGE UP (ref 0–0.2)
BASOPHILS NFR BLD AUTO: 0.8 % — SIGNIFICANT CHANGE UP (ref 0–1)
BILIRUB SERPL-MCNC: 0.2 MG/DL — SIGNIFICANT CHANGE UP (ref 0.2–1.2)
BUN SERPL-MCNC: 19 MG/DL — SIGNIFICANT CHANGE UP (ref 10–20)
CALCIUM SERPL-MCNC: 10.1 MG/DL — SIGNIFICANT CHANGE UP (ref 8.5–10.1)
CHLORIDE SERPL-SCNC: 103 MMOL/L — SIGNIFICANT CHANGE UP (ref 98–110)
CO2 SERPL-SCNC: 27 MMOL/L — SIGNIFICANT CHANGE UP (ref 17–32)
CREAT SERPL-MCNC: 1.1 MG/DL — SIGNIFICANT CHANGE UP (ref 0.7–1.5)
D DIMER BLD IA.RAPID-MCNC: 82 NG/ML DDU — SIGNIFICANT CHANGE UP (ref 0–230)
EOSINOPHIL # BLD AUTO: 0.35 K/UL — SIGNIFICANT CHANGE UP (ref 0–0.7)
EOSINOPHIL NFR BLD AUTO: 4.2 % — SIGNIFICANT CHANGE UP (ref 0–8)
GLUCOSE SERPL-MCNC: 90 MG/DL — SIGNIFICANT CHANGE UP (ref 70–99)
HCT VFR BLD CALC: 42.4 % — SIGNIFICANT CHANGE UP (ref 37–47)
HGB BLD-MCNC: 13.6 G/DL — SIGNIFICANT CHANGE UP (ref 12–16)
IMM GRANULOCYTES NFR BLD AUTO: 0.2 % — SIGNIFICANT CHANGE UP (ref 0.1–0.3)
LYMPHOCYTES # BLD AUTO: 1.45 K/UL — SIGNIFICANT CHANGE UP (ref 1.2–3.4)
LYMPHOCYTES # BLD AUTO: 17.6 % — LOW (ref 20.5–51.1)
MCHC RBC-ENTMCNC: 30.9 PG — SIGNIFICANT CHANGE UP (ref 27–31)
MCHC RBC-ENTMCNC: 32.1 G/DL — SIGNIFICANT CHANGE UP (ref 32–37)
MCV RBC AUTO: 96.4 FL — SIGNIFICANT CHANGE UP (ref 81–99)
MONOCYTES # BLD AUTO: 0.72 K/UL — HIGH (ref 0.1–0.6)
MONOCYTES NFR BLD AUTO: 8.7 % — SIGNIFICANT CHANGE UP (ref 1.7–9.3)
NEUTROPHILS # BLD AUTO: 5.64 K/UL — SIGNIFICANT CHANGE UP (ref 1.4–6.5)
NEUTROPHILS NFR BLD AUTO: 68.5 % — SIGNIFICANT CHANGE UP (ref 42.2–75.2)
NRBC # BLD: 0 /100 WBCS — SIGNIFICANT CHANGE UP (ref 0–0)
NT-PROBNP SERPL-SCNC: 556 PG/ML — HIGH (ref 0–300)
PLATELET # BLD AUTO: 237 K/UL — SIGNIFICANT CHANGE UP (ref 130–400)
POTASSIUM SERPL-MCNC: 4.1 MMOL/L — SIGNIFICANT CHANGE UP (ref 3.5–5)
POTASSIUM SERPL-SCNC: 4.1 MMOL/L — SIGNIFICANT CHANGE UP (ref 3.5–5)
PROT SERPL-MCNC: 7.2 G/DL — SIGNIFICANT CHANGE UP (ref 6–8)
RBC # BLD: 4.4 M/UL — SIGNIFICANT CHANGE UP (ref 4.2–5.4)
RBC # FLD: 13.6 % — SIGNIFICANT CHANGE UP (ref 11.5–14.5)
SARS-COV-2 RNA SPEC QL NAA+PROBE: SIGNIFICANT CHANGE UP
SODIUM SERPL-SCNC: 143 MMOL/L — SIGNIFICANT CHANGE UP (ref 135–146)
TROPONIN T SERPL-MCNC: <0.01 NG/ML — SIGNIFICANT CHANGE UP
TROPONIN T SERPL-MCNC: <0.01 NG/ML — SIGNIFICANT CHANGE UP
WBC # BLD: 8.25 K/UL — SIGNIFICANT CHANGE UP (ref 4.8–10.8)
WBC # FLD AUTO: 8.25 K/UL — SIGNIFICANT CHANGE UP (ref 4.8–10.8)

## 2020-11-29 PROCEDURE — 99285 EMERGENCY DEPT VISIT HI MDM: CPT | Mod: CS

## 2020-11-29 PROCEDURE — 71045 X-RAY EXAM CHEST 1 VIEW: CPT | Mod: 26

## 2020-11-29 PROCEDURE — 93010 ELECTROCARDIOGRAM REPORT: CPT

## 2020-11-29 RX ORDER — ASPIRIN/CALCIUM CARB/MAGNESIUM 324 MG
81 TABLET ORAL DAILY
Refills: 0 | Status: DISCONTINUED | OUTPATIENT
Start: 2020-11-29 | End: 2020-12-03

## 2020-11-29 RX ORDER — CLOPIDOGREL BISULFATE 75 MG/1
75 TABLET, FILM COATED ORAL DAILY
Refills: 0 | Status: DISCONTINUED | OUTPATIENT
Start: 2020-11-29 | End: 2020-12-03

## 2020-11-29 RX ORDER — LAMOTRIGINE 25 MG/1
1 TABLET, ORALLY DISINTEGRATING ORAL
Qty: 0 | Refills: 0 | DISCHARGE

## 2020-11-29 RX ORDER — GABAPENTIN 400 MG/1
300 CAPSULE ORAL THREE TIMES A DAY
Refills: 0 | Status: DISCONTINUED | OUTPATIENT
Start: 2020-11-29 | End: 2020-12-04

## 2020-11-29 RX ORDER — BENZOCAINE 10 %
1 GEL (GRAM) MUCOUS MEMBRANE EVERY 6 HOURS
Refills: 0 | Status: DISCONTINUED | OUTPATIENT
Start: 2020-11-29 | End: 2020-12-04

## 2020-11-29 RX ORDER — AMLODIPINE BESYLATE 2.5 MG/1
10 TABLET ORAL ONCE
Refills: 0 | Status: COMPLETED | OUTPATIENT
Start: 2020-11-29 | End: 2020-11-29

## 2020-11-29 RX ORDER — BENZOCAINE AND MENTHOL 5; 1 G/100ML; G/100ML
1 LIQUID ORAL EVERY 6 HOURS
Refills: 0 | Status: DISCONTINUED | OUTPATIENT
Start: 2020-11-29 | End: 2020-11-29

## 2020-11-29 RX ORDER — LAMOTRIGINE 25 MG/1
100 TABLET, ORALLY DISINTEGRATING ORAL DAILY
Refills: 0 | Status: DISCONTINUED | OUTPATIENT
Start: 2020-11-29 | End: 2020-12-04

## 2020-11-29 RX ORDER — ATORVASTATIN CALCIUM 80 MG/1
20 TABLET, FILM COATED ORAL AT BEDTIME
Refills: 0 | Status: DISCONTINUED | OUTPATIENT
Start: 2020-11-29 | End: 2020-12-04

## 2020-11-29 RX ORDER — AMLODIPINE BESYLATE 2.5 MG/1
2.5 TABLET ORAL DAILY
Refills: 0 | Status: DISCONTINUED | OUTPATIENT
Start: 2020-11-29 | End: 2020-12-04

## 2020-11-29 RX ORDER — QUETIAPINE FUMARATE 200 MG/1
200 TABLET, FILM COATED ORAL AT BEDTIME
Refills: 0 | Status: DISCONTINUED | OUTPATIENT
Start: 2020-11-29 | End: 2020-12-04

## 2020-11-29 RX ORDER — FAMOTIDINE 10 MG/ML
20 INJECTION INTRAVENOUS DAILY
Refills: 0 | Status: DISCONTINUED | OUTPATIENT
Start: 2020-11-29 | End: 2020-12-04

## 2020-11-29 RX ORDER — DABIGATRAN ETEXILATE MESYLATE 150 MG/1
150 CAPSULE ORAL EVERY 12 HOURS
Refills: 0 | Status: DISCONTINUED | OUTPATIENT
Start: 2020-11-29 | End: 2020-12-03

## 2020-11-29 RX ORDER — MONTELUKAST 4 MG/1
10 TABLET, CHEWABLE ORAL DAILY
Refills: 0 | Status: DISCONTINUED | OUTPATIENT
Start: 2020-11-29 | End: 2020-12-04

## 2020-11-29 RX ORDER — FUROSEMIDE 40 MG
40 TABLET ORAL DAILY
Refills: 0 | Status: DISCONTINUED | OUTPATIENT
Start: 2020-11-29 | End: 2020-12-04

## 2020-11-29 RX ORDER — FAMOTIDINE 10 MG/ML
1 INJECTION INTRAVENOUS
Qty: 0 | Refills: 0 | DISCHARGE

## 2020-11-29 RX ORDER — METOPROLOL TARTRATE 50 MG
100 TABLET ORAL
Refills: 0 | Status: DISCONTINUED | OUTPATIENT
Start: 2020-11-29 | End: 2020-12-04

## 2020-11-29 RX ORDER — BUDESONIDE AND FORMOTEROL FUMARATE DIHYDRATE 160; 4.5 UG/1; UG/1
2 AEROSOL RESPIRATORY (INHALATION)
Refills: 0 | Status: DISCONTINUED | OUTPATIENT
Start: 2020-11-29 | End: 2020-12-04

## 2020-11-29 RX ADMIN — Medication 1 SPRAY(S): at 21:37

## 2020-11-29 RX ADMIN — QUETIAPINE FUMARATE 200 MILLIGRAM(S): 200 TABLET, FILM COATED ORAL at 21:01

## 2020-11-29 RX ADMIN — ATORVASTATIN CALCIUM 20 MILLIGRAM(S): 80 TABLET, FILM COATED ORAL at 21:01

## 2020-11-29 RX ADMIN — Medication 100 MILLIGRAM(S): at 17:34

## 2020-11-29 RX ADMIN — AMLODIPINE BESYLATE 10 MILLIGRAM(S): 2.5 TABLET ORAL at 21:00

## 2020-11-29 RX ADMIN — DABIGATRAN ETEXILATE MESYLATE 150 MILLIGRAM(S): 150 CAPSULE ORAL at 17:36

## 2020-11-29 RX ADMIN — Medication 0.5 MILLIGRAM(S): at 17:35

## 2020-11-29 RX ADMIN — BUDESONIDE AND FORMOTEROL FUMARATE DIHYDRATE 2 PUFF(S): 160; 4.5 AEROSOL RESPIRATORY (INHALATION) at 21:01

## 2020-11-29 RX ADMIN — GABAPENTIN 300 MILLIGRAM(S): 400 CAPSULE ORAL at 21:37

## 2020-11-29 RX ADMIN — CLOPIDOGREL BISULFATE 75 MILLIGRAM(S): 75 TABLET, FILM COATED ORAL at 17:34

## 2020-11-29 NOTE — ED ADULT NURSE NOTE - OBJECTIVE STATEMENT
pt presents with chest pain and difficulty swallowing started today at urgent care when pt went to be tested for COVID. pt denies symptoms in the ED. pt reports her daughter is positive COVID . pt denies fever or SOB. pt was placed on continuous cardiac and pulse ox monitoring upon arrival

## 2020-11-29 NOTE — H&P ADULT - ATTENDING COMMENTS
patient seen and examined independently   agree with above note with the following additions/corrections     A/P   Chest pain : resolved   patient says that she was supposed to get stress test before the pandemic but she never did necause the pandemic started   will get lexiscan stress test   echo   cardiology eval       patient says that she is to get carotid disease by Dr Huerta in december and that vascular started her on plavix and patient is already on asa and pradaxa. will consult vascular to comment on the need of dual antiplatelets given that patient is also on pradaxa for afib history     patient was at [facility] in october and had TIA symptoms neuro at that time did not want plavix     Afib chronic now in NSR: on pradaxa 150 BID     patient has pain  in her neck under her left ear on exam I appreciated a LN which is tender: will treat with augmentin empirically and get dental eval to rule out oral tooth infection as a cause of this. US neck       COPD stable     patient has no difficulty swallowing.

## 2020-11-29 NOTE — H&P ADULT - NSHPLABSRESULTS_GEN_ALL_CORE
(11-29 @ 13:15)                      13.6  8.25 )-----------( 237                 42.4    Neutrophils = 5.64 (68.5%)  Lymphocytes = 1.45 (17.6%)  Eosinophils = 0.35 (4.2%)  Basophils = 0.07 (0.8%)  Monocytes = 0.72 (8.7%)  Bands = --%    11-29    143  |  103  |  19  ----------------------------<  90  4.1   |  27  |  1.1    Ca    10.1      29 Nov 2020 13:15    TPro  7.2  /  Alb  4.3  /  TBili  0.2  /  DBili  x   /  AST  51<H>  /  ALT  32  /  AlkPhos  232<H>  11-29      CARDIAC MARKERS ( 29 Nov 2020 13:15 )  Trop <0.01 ng/mL / CK x     / CKMB x           RVP:          Tox:

## 2020-11-29 NOTE — ED ADULT TRIAGE NOTE - CHIEF COMPLAINT QUOTE
as per ems pt went to urgent care this morning because she was exposed to covid and was having painful swallowing. at urgent care she started getting chest pain on the left side

## 2020-11-29 NOTE — H&P ADULT - NSHPPHYSICALEXAM_GEN_ALL_CORE
PHYSICAL EXAM:  GENERAL: NAD, lying in bed comfortably  HEAD:  Atraumatic, Normocephalic  EYES: EOMI, PERRLA, conjunctiva and sclera clear  ENT: Moist mucous membranes  NECK: Supple, No JVD, +TTP of submandibular glands.   CHEST/LUNG: Clear to auscultation bilaterally; Unlabored respirations  HEART: Regular rate and rhythm; No murmurs, rubs, or gallops  ABDOMEN: Bowel sounds present; Soft, Nontender, Nondistended. No hepatomegaly  EXTREMITIES:  2+ Peripheral Pulses, brisk capillary refill. No clubbing, cyanosis, or edema  NERVOUS SYSTEM:  Alert & Oriented X3, speech clear. No deficits   MSK: FROM all 4 extremities, full and equal strength  SKIN: No rashes or lesions

## 2020-11-29 NOTE — ED PROVIDER NOTE - ATTENDING CONTRIBUTION TO CARE
75 yr old f w/ a pmh significant for COPD (5L), CAD, HTN, HLD who presents with chest pain. Pt states that since today she has been having L sided chest pain. Of note, pt also reports that she has also been recently exposed to covid (pts daughter, partner and grandchildren all tested positive and live in the household). Pt denies any fever, chills, nausea, vomiting, or any other complaints.     Review of Systems    Constitutional: (-) fever  Cardiovascular: (+) chest pain, (-) syncope  Respiratory: (-) cough, (-) shortness of breath  Gastrointestinal: (-) vomiting, (-) diarrhea, (-) abdominal pain  Musculoskeletal: (-) neck pain, (-) back pain, (-) joint pain  Integumentary: (-) rash, (-) edema  Neurological: (-) headache, (-) altered mental status    Except as documented in the HPI, all other systems are negative.    VITAL SIGNS: I have reviewed nursing notes and confirm.  CONSTITUTIONAL: non-toxic, well appearing  SKIN: no rash, no petechiae.  EYES: PERRL, EOMI, pink conjunctiva, anicteric  ENT: tongue midline, no exudates, MMM  NECK: Supple; no meningismus, no JVD  CARD: RRR, no murmurs, equal radial pulses bilaterally 2+  RESP: CTAB, no respiratory distress  ABD: Soft, non-tender, non-distended, no peritoneal signs, no HSM, no CVA tenderness  EXT: Normal ROM x4. No edema. No calves tenderness  NEURO: Alert, oriented. CN2-12 intact, equal strength bilaterally, nl gait.  PSYCH: Cooperative, appropriate.    a/p  75 yr old f w/ a significant cardiac history, presenting with chest pain. labs, ekg, imaging obtained. pt to be admitted to medicine for ACS workup.

## 2020-11-29 NOTE — ED PROVIDER NOTE - NS ED ROS FT
CONSTITUTIONAL: (-) fevers, (-) chills, (-) malaise, (-) decreased appetite  EYES: (-) vision changes, (-) blurry vision, (-) double vision  ENT: (-) congestion, (-) rhinorrhea, (+) sore throat, (-) tinnitus, (-) difficulty swallowing  NECK: (-) neck pain, (-) neck stiffness  CARDIO: (+) chest pain, (-) palpitations, (-) edema  PULM: (+) cough, (-) sputum, (-) chest tightness, (-) shortness of breath, (-) wheezing, (-) hemoptysis, (-) stridor  GI: (-) nausea, (-) vomiting, (-) diarrhea, (-) constipation, (-) abdominal pain, (-) melena, (-) hematochezia, (-) rectal bleeding  : (-) dysuria, (-) hematuria, (-) frequency, (-) urgency, (-) flank pain  ENDO: (-) polyuria, (-) polydipsia, (-) polyphagia  HEME: (-) easy bruising, (-) easy bleeding  MSK: (-) back pain, (-) myalgias, (-) gait difficulty  SKIN: (-) rashes, (-) pallor  NEURO: (-) headache, (-) dizziness, (-) lightheadedness, (-) weakness, (-) paresthesias, (-) numbness, (-) syncope    *all other systems negative except as documented above and in the HPI*

## 2020-11-29 NOTE — ED ADULT NURSE NOTE - ILLNESS RECENT EXPOSURE
Subjective   Patient ID: Kailee is a 80 year old female.    Chief Complaint   Patient presents with   • Follow-up Hypertension     Patient is here for regular follow-up visit for hypertension.  Blood pressure is well controlled at 118/70 on current regimen.  She is doing Sirena 2 days a week.  Has lost a couple pounds.  Will be due for lab work mammogram and bone density.  Declines flu shot      Patient's medications, allergies, past medical, surgical, social and family histories were reviewed and updated as appropriate.    Review of Systems   All other systems reviewed and are negative.      Objective   Physical Exam  Constitutional:       Appearance: Normal appearance.   HENT:      Head: Normocephalic and atraumatic.   Eyes:      Conjunctiva/sclera: Conjunctivae normal.   Cardiovascular:      Rate and Rhythm: Normal rate and regular rhythm.   Pulmonary:      Effort: Pulmonary effort is normal.      Breath sounds: Normal breath sounds.   Neurological:      Mental Status: She is alert.         Assessment   Problem List Items Addressed This Visit        Circulatory    Essential hypertension     Blood pressure is 118/70 on current regimen.         Relevant Orders    CBC WITH DIFFERENTIAL    COMPREHENSIVE METABOLIC PANEL    LIPID PANEL WITH REFLEX    THYROID STIMULATING HORMONE REFLEX       Musculoskeletal    Osteopenia     We will get a DEXA scan at [facility] when she goes for her mammogram.           Other Visit Diagnoses     Postmenopausal    -  Primary    Relevant Orders    BD DEXA AXIAL SKELETON    Breast cancer screening        Relevant Orders    MAMMO SCREENING IMPLANT BILATERAL W SLADE        
None known

## 2020-11-29 NOTE — ED PROVIDER NOTE - PHYSICAL EXAMINATION
VITALS:  I have reviewed the initial vital signs.  GENERAL: Well-developed, well-nourished, in no acute distress. Nontoxic.  HEENT: Sclera clear. No conjunctival pallor. EOMI, PERRLA. MMM. OP mildly erythematous. Tonsils 2+ bilaterally, no exudate. Uvula midline, no edema. TM's clear b/l. Nasal turbinates clear and w/o discharge. No sinus ttp.  NECK: supple w FROM. No cervical adenopathy. No JVD.   CARDIO: RRR, nl S1 and S2. No murmurs, rubs, or gallops. No peripheral edema. 2+ radial and pedal pulses bilaterally.  PULM: Normal effort. No tachypnea or retractions. No stridor. Coarse breath sounds throughout.  MSK: FROM to extremities x4.   GI: Normal bowel sounds. Abdomen soft and non-distended. Nontender.  : No CVA tenderness b/l.  SKIN: Warm, dry. No pallor. No rash.   NEURO: A&Ox3. Speech clear. No focal deficits.  PSYCH: Calm and cooperative.

## 2020-11-29 NOTE — ED PROVIDER NOTE - CLINICAL SUMMARY MEDICAL DECISION MAKING FREE TEXT BOX
75 yr old f w/ a significant cardiac history, presenting with chest pain. labs, ekg, imaging obtained. pt to be admitted to medicine for ACS workup.

## 2020-11-29 NOTE — H&P ADULT - ASSESSMENT
Ms. Henderson is a 75F with a PMH of COPD on home oxygen, pAfib, HFpEF, HTN, HLD, bipolar disorder presented to the ED for chest pain associated with swallowing, found to have a LLL opacity on CXR and recent COVID exposure admitted to rule out ACS.     # Chest pain  - Associated with odynophagia, unlikely ACS. EMS/Ed team said they saw TWI, but repeat EKG baseline. Trops and BNP negative. Follow-up repeat troponin.   - CXR with curious LLL opacity. follow-up CXR in AM to reassess.   - Not volume overloaded, no CHF exacerbation.   - Will give Lozenger for throat pain.   - Echo done in October at [facility], but records not in system  - Follow-up COVID PCR and procal  - Likely dc in 24 hours     # Paroxysmal atrial fibrillation  - On Pradaxa 150mg PO twice daily and ASA 81 mg   - Plavix recently added after a recent TIA last month. Follow-up with neurologist/cardiologist as to when we can dc ASA or Plavix.   - Rate controlled     # COPD  - No increase in basal need of O2, no increased cough, fever, chills, or dyspnea so exacerbation is unlikely.   - Continue home inhalers.    # HFpEF  # HTN  # HLD  - Preserved EF according to Dr. Bishop's notes, although no echo report in [place].   - Continue PO diuretics and home BP and cholesterol meds    # Bipoar Disorder  - Mood stable  - Continue home meds Lamictal 100mg PO daily, Seroquel 200mg at bedtime, and Ativan 0.5mg PO twice daily PRN    DVT ppx: Lovenox 40 mg SubQ daily   GI ppx: Pepcid 10mg PO daily    Diet: DASH/TLC   Activity: IAT   Dispo: From home   Code: FULL  Ms. Henderson is a 75F with a PMH of COPD on home oxygen, pAfib, HFpEF, HTN, HLD, bipolar disorder presented to the ED for chest pain associated with swallowing, found to have a LLL opacity on CXR and recent COVID exposure admitted to rule out ACS.     # Chest pain  - Associated with odynophagia, unlikely ACS. EMS/Ed team said they saw TWI, but repeat EKG baseline. Trops and BNP negative. Follow-up repeat troponin.   - CXR with curious LLL opacity. follow-up CXR in AM to reassess.   - Not volume overloaded, no CHF exacerbation.   - Will give Lozenger for throat pain.   - Echo done in October at [facility], but records not in system  - Follow-up COVID PCR and procal  - Likely dc in 24 hours     # Paroxysmal atrial fibrillation  - On Pradaxa 150mg PO twice daily and ASA 81 mg   - Plavix recently added after a recent TIA last month. Follow-up with neurologist/cardiologist as to when we can dc ASA or Plavix.   - Rate controlled     # COPD  - No increase in basal need of O2, no increased cough, fever, chills, or dyspnea so exacerbation is unlikely.   - Continue home inhalers.    # chronic HFpEF  # HTN  # HLD  - Preserved EF according to Dr. Bishop's notes, although no echo report in [place].   - Continue PO diuretics and home BP and cholesterol meds    # Bipoar Disorder  - Mood stable  - Continue home meds Lamictal 100mg PO daily, Seroquel 200mg at bedtime, and Ativan 0.5mg PO twice daily PRN    DVT ppx: Lovenox 40 mg SubQ daily   GI ppx: Pepcid 10mg PO daily    Diet: DASH/TLC   Activity: IAT   Dispo: From home   Code: FULL

## 2020-11-29 NOTE — ED PROVIDER NOTE - OBJECTIVE STATEMENT
75 year old female w hx of COPD on 5 LPM NC at home (pulm - Maroun), afib on eliquis, CAD (Cardio - Lefkovic), CHF, HTN, HLD presents to the ED for evaluation of intermittent burning mid sternal chest pain since this morning. Pt also admits to sore throat and more frequent non-productive cough. States multiple family members have tested positive for COVID this week, she had a negative test at an C this morning. Denies fevers/chills, congestion, shortness of breath, palpitations, abd pain, nausea, vomiting, diarrhea, leg pain/swelling.

## 2020-11-29 NOTE — H&P ADULT - HISTORY OF PRESENT ILLNESS
Chief Complaint: Chest pain after swallowing       Past Medical History: COPD on 5L home O2, paroxysmal afib, carotid stenosis, HFpEF, HTN, HLD, Bipolar disorder      Past Surgical History: None relevant       History of present illness goes back to 6AM on the morning of presentation when the patient first noticed chest pain that was associated with swallowing. She states that she was in her usual state of health the night prior. Of note, she lives with her daughter who is currently in their home quarantining, but the patient states her daughter is not doing a good job and was concerned that she might have COVID, so she went to urgent care and got tested, results pending. Her chest pain was described as severe when she swallowed, radiating to her neck and shoulder so she called EMS and was brought to the ED.        In the ED, her vitals were normal. EMS reported that in transit there was some anterior leads that showed TWI, but repeat EKG in the ED did not show any changes. CXR did however show a LLL opacity, increased from last month's CXR representing a possible developing pneumonia. She also wanted to come to the ED because she is planning to undergo a carotid endarterectomy on 12/15 and was concerned that uf she was ill, she would need to postpone the procedure. Rapid COVID was negative, PCR pending. Admitted by ED for ACS rule-out.       ROS: Denies headache, changes in vision, chest pain, palpitations, shortness of breath, cough, fever, chills, nausea, vomiting, diarrhea, and constipation.

## 2020-11-30 LAB
ALBUMIN SERPL ELPH-MCNC: 3.8 G/DL — SIGNIFICANT CHANGE UP (ref 3.5–5.2)
ALP SERPL-CCNC: 206 U/L — HIGH (ref 30–115)
ALT FLD-CCNC: 28 U/L — SIGNIFICANT CHANGE UP (ref 0–41)
ANION GAP SERPL CALC-SCNC: 14 MMOL/L — SIGNIFICANT CHANGE UP (ref 7–14)
AST SERPL-CCNC: 45 U/L — HIGH (ref 0–41)
BASOPHILS # BLD AUTO: 0.04 K/UL — SIGNIFICANT CHANGE UP (ref 0–0.2)
BASOPHILS NFR BLD AUTO: 0.6 % — SIGNIFICANT CHANGE UP (ref 0–1)
BILIRUB SERPL-MCNC: 0.4 MG/DL — SIGNIFICANT CHANGE UP (ref 0.2–1.2)
BUN SERPL-MCNC: 17 MG/DL — SIGNIFICANT CHANGE UP (ref 10–20)
CALCIUM SERPL-MCNC: 9.3 MG/DL — SIGNIFICANT CHANGE UP (ref 8.5–10.1)
CHLORIDE SERPL-SCNC: 106 MMOL/L — SIGNIFICANT CHANGE UP (ref 98–110)
CO2 SERPL-SCNC: 22 MMOL/L — SIGNIFICANT CHANGE UP (ref 17–32)
CREAT SERPL-MCNC: 0.9 MG/DL — SIGNIFICANT CHANGE UP (ref 0.7–1.5)
CRP SERPL-MCNC: 1.88 MG/DL — HIGH (ref 0–0.4)
EOSINOPHIL # BLD AUTO: 0.34 K/UL — SIGNIFICANT CHANGE UP (ref 0–0.7)
EOSINOPHIL NFR BLD AUTO: 5.5 % — SIGNIFICANT CHANGE UP (ref 0–8)
FERRITIN SERPL-MCNC: 218 NG/ML — HIGH (ref 15–150)
GLUCOSE SERPL-MCNC: 88 MG/DL — SIGNIFICANT CHANGE UP (ref 70–99)
HCT VFR BLD CALC: 40.9 % — SIGNIFICANT CHANGE UP (ref 37–47)
HGB BLD-MCNC: 13.2 G/DL — SIGNIFICANT CHANGE UP (ref 12–16)
IMM GRANULOCYTES NFR BLD AUTO: 0.3 % — SIGNIFICANT CHANGE UP (ref 0.1–0.3)
LYMPHOCYTES # BLD AUTO: 1.21 K/UL — SIGNIFICANT CHANGE UP (ref 1.2–3.4)
LYMPHOCYTES # BLD AUTO: 19.6 % — LOW (ref 20.5–51.1)
MAGNESIUM SERPL-MCNC: 1.8 MG/DL — SIGNIFICANT CHANGE UP (ref 1.8–2.4)
MCHC RBC-ENTMCNC: 31.3 PG — HIGH (ref 27–31)
MCHC RBC-ENTMCNC: 32.3 G/DL — SIGNIFICANT CHANGE UP (ref 32–37)
MCV RBC AUTO: 96.9 FL — SIGNIFICANT CHANGE UP (ref 81–99)
MONOCYTES # BLD AUTO: 0.66 K/UL — HIGH (ref 0.1–0.6)
MONOCYTES NFR BLD AUTO: 10.7 % — HIGH (ref 1.7–9.3)
NEUTROPHILS # BLD AUTO: 3.9 K/UL — SIGNIFICANT CHANGE UP (ref 1.4–6.5)
NEUTROPHILS NFR BLD AUTO: 63.3 % — SIGNIFICANT CHANGE UP (ref 42.2–75.2)
NRBC # BLD: 0 /100 WBCS — SIGNIFICANT CHANGE UP (ref 0–0)
PLATELET # BLD AUTO: 208 K/UL — SIGNIFICANT CHANGE UP (ref 130–400)
POTASSIUM SERPL-MCNC: 3.4 MMOL/L — LOW (ref 3.5–5)
POTASSIUM SERPL-SCNC: 3.4 MMOL/L — LOW (ref 3.5–5)
PROCALCITONIN SERPL-MCNC: 0.09 NG/ML — SIGNIFICANT CHANGE UP (ref 0.02–0.1)
PROCALCITONIN SERPL-MCNC: 0.1 NG/ML — SIGNIFICANT CHANGE UP (ref 0.02–0.1)
PROT SERPL-MCNC: 6.4 G/DL — SIGNIFICANT CHANGE UP (ref 6–8)
RBC # BLD: 4.22 M/UL — SIGNIFICANT CHANGE UP (ref 4.2–5.4)
RBC # FLD: 13.7 % — SIGNIFICANT CHANGE UP (ref 11.5–14.5)
SODIUM SERPL-SCNC: 142 MMOL/L — SIGNIFICANT CHANGE UP (ref 135–146)
WBC # BLD: 6.17 K/UL — SIGNIFICANT CHANGE UP (ref 4.8–10.8)
WBC # FLD AUTO: 6.17 K/UL — SIGNIFICANT CHANGE UP (ref 4.8–10.8)

## 2020-11-30 PROCEDURE — 71045 X-RAY EXAM CHEST 1 VIEW: CPT | Mod: 26

## 2020-11-30 PROCEDURE — 99223 1ST HOSP IP/OBS HIGH 75: CPT | Mod: AI

## 2020-11-30 RX ORDER — ADENOSINE 3 MG/ML
60 INJECTION INTRAVENOUS ONCE
Refills: 0 | Status: DISCONTINUED | OUTPATIENT
Start: 2020-11-30 | End: 2020-12-01

## 2020-11-30 RX ADMIN — Medication 100 MILLIGRAM(S): at 05:55

## 2020-11-30 RX ADMIN — ATORVASTATIN CALCIUM 20 MILLIGRAM(S): 80 TABLET, FILM COATED ORAL at 22:01

## 2020-11-30 RX ADMIN — DABIGATRAN ETEXILATE MESYLATE 150 MILLIGRAM(S): 150 CAPSULE ORAL at 05:55

## 2020-11-30 RX ADMIN — Medication 1 TABLET(S): at 17:00

## 2020-11-30 RX ADMIN — Medication 1 SPRAY(S): at 17:01

## 2020-11-30 RX ADMIN — LAMOTRIGINE 100 MILLIGRAM(S): 25 TABLET, ORALLY DISINTEGRATING ORAL at 11:49

## 2020-11-30 RX ADMIN — Medication 1 SPRAY(S): at 05:55

## 2020-11-30 RX ADMIN — Medication 0.5 MILLIGRAM(S): at 22:08

## 2020-11-30 RX ADMIN — Medication 1 SPRAY(S): at 11:49

## 2020-11-30 RX ADMIN — Medication 100 MILLIGRAM(S): at 17:00

## 2020-11-30 RX ADMIN — GABAPENTIN 300 MILLIGRAM(S): 400 CAPSULE ORAL at 22:01

## 2020-11-30 RX ADMIN — BUDESONIDE AND FORMOTEROL FUMARATE DIHYDRATE 2 PUFF(S): 160; 4.5 AEROSOL RESPIRATORY (INHALATION) at 20:38

## 2020-11-30 RX ADMIN — Medication 0.5 MILLIGRAM(S): at 09:45

## 2020-11-30 RX ADMIN — Medication 81 MILLIGRAM(S): at 11:49

## 2020-11-30 RX ADMIN — CLOPIDOGREL BISULFATE 75 MILLIGRAM(S): 75 TABLET, FILM COATED ORAL at 11:49

## 2020-11-30 RX ADMIN — MONTELUKAST 10 MILLIGRAM(S): 4 TABLET, CHEWABLE ORAL at 11:49

## 2020-11-30 RX ADMIN — Medication 40 MILLIGRAM(S): at 05:55

## 2020-11-30 RX ADMIN — GABAPENTIN 300 MILLIGRAM(S): 400 CAPSULE ORAL at 13:23

## 2020-11-30 RX ADMIN — DABIGATRAN ETEXILATE MESYLATE 150 MILLIGRAM(S): 150 CAPSULE ORAL at 17:01

## 2020-11-30 RX ADMIN — GABAPENTIN 300 MILLIGRAM(S): 400 CAPSULE ORAL at 05:55

## 2020-11-30 RX ADMIN — QUETIAPINE FUMARATE 200 MILLIGRAM(S): 200 TABLET, FILM COATED ORAL at 22:01

## 2020-11-30 RX ADMIN — BUDESONIDE AND FORMOTEROL FUMARATE DIHYDRATE 2 PUFF(S): 160; 4.5 AEROSOL RESPIRATORY (INHALATION) at 10:35

## 2020-11-30 RX ADMIN — FAMOTIDINE 20 MILLIGRAM(S): 10 INJECTION INTRAVENOUS at 11:49

## 2020-11-30 RX ADMIN — AMLODIPINE BESYLATE 2.5 MILLIGRAM(S): 2.5 TABLET ORAL at 05:55

## 2020-11-30 NOTE — PROGRESS NOTE ADULT - SUBJECTIVE AND OBJECTIVE BOX
SUBJECTIVE:    Patient is a 75y old Female who presents with a chief complaint of Odynophagia associated with chest pain (30 Nov 2020 15:30)    Currently admitted to medicine with the primary diagnosis of Chest pain.  Today is hospital day 1d. This morning she is resting  in bed and reports left neck pain and swelling, as well as the left sided chest pain at the ribs.    PAST MEDICAL & SURGICAL HISTORY  Falls    Congestive heart failure    Transient ischemic attack    FLAVIO on CPAP    Bipolar 1 disorder    Allergic reaction    PVD (peripheral vascular disease)    Other emphysema    Other cardiomyopathy    TIA (transient ischemic attack)    COPD (chronic obstructive pulmonary disease)    Depression    Hypertension    History of cholecystectomy      ALLERGIES:  codeine (Other (Moderate))  Depakote (Unknown)  IV Contrast (Anaphylaxis)  losartan (Angioedema)  Risperdal (Other)  verapamil (Short breath; Angioedema)    MEDICATIONS:  STANDING MEDICATIONS  aDENosine Injectable (ADENOSCAN) 60 milliGRAM(s) IV Bolus once  amLODIPine   Tablet 2.5 milliGRAM(s) Oral daily  amoxicillin  875 milliGRAM(s)/clavulanate 1 Tablet(s) Oral two times a day  aspirin  chewable 81 milliGRAM(s) Oral daily  atorvastatin 20 milliGRAM(s) Oral at bedtime  benzocaine 20% Spray 1 Spray(s) Topical every 6 hours  budesonide 160 MICROgram(s)/formoterol 4.5 MICROgram(s) Inhaler 2 Puff(s) Inhalation two times a day  clopidogrel Tablet 75 milliGRAM(s) Oral daily  dabigatran 150 milliGRAM(s) Oral every 12 hours  famotidine    Tablet 20 milliGRAM(s) Oral daily  furosemide    Tablet 40 milliGRAM(s) Oral daily  gabapentin 300 milliGRAM(s) Oral three times a day  lamoTRIgine 100 milliGRAM(s) Oral daily  metoprolol tartrate 100 milliGRAM(s) Oral two times a day  montelukast 10 milliGRAM(s) Oral daily  QUEtiapine 200 milliGRAM(s) Oral at bedtime    PRN MEDICATIONS  LORazepam     Tablet 0.5 milliGRAM(s) Oral two times a day PRN    VITALS:   T(F): 97.3  HR: 77  BP: 123/68  RR: 18  SpO2: 95%    LABS:                        13.2   6.17  )-----------( 208      ( 30 Nov 2020 04:37 )             40.9     11-30    142  |  106  |  17  ----------------------------<  88  3.4<L>   |  22  |  0.9    Ca    9.3      30 Nov 2020 04:37  Mg     1.8     11-30    TPro  6.4  /  Alb  3.8  /  TBili  0.4  /  DBili  x   /  AST  45<H>  /  ALT  28  /  AlkPhos  206<H>  11-30          Troponin T, Serum: <0.01 ng/mL (11-29-20 @ 21:30)      CARDIAC MARKERS ( 29 Nov 2020 21:30 )  x     / <0.01 ng/mL / x     / x     / x      CARDIAC MARKERS ( 29 Nov 2020 13:15 )  x     / <0.01 ng/mL / x     / x     / x          RADIOLOGY:    < from: CT Brain Stroke Protocol (10.14.20 @ 16:50) >  IMPRESSION:  No acute intracranial pathology. No evidence of midline shift, mass effect or intracranial hemorrhage.    Mild chronic microvascular-type changes.      < end of copied text >    PHYSICAL EXAM:  GEN: No acute distress,   Neck: swollen left neck lump is palpated, tender to touch  LUNGS: mild rhonchi b/l  HEART: Regular, +S1 s2  ABD: Soft, non-tender, non-distended.   EXT: NC/NC/NE/2+PP/BRADY/Skin Intact.   NEURO: AAOX3

## 2020-11-30 NOTE — PROGRESS NOTE ADULT - ASSESSMENT
Ms. Henderson is a 75F with a PMH of COPD on home oxygen, pAfib, HFpEF, HTN, HLD, bipolar disorder presented to the ED for chest pain associated with swallowing, found to have a LLL opacity on CXR and recent COVID exposure admitted to rule out ACS.     #Neck pain  - swollen lump at the left side of the neck, r/o dental infection vs swollen lymph node  - started on augmentin  - f/u US Neck  - f/u Dental eval    # Chest pain, most likely pleuritic  - Associated with odynophagia, unlikely ACS. EMS/Ed team said they saw TWI, but repeat EKG baseline.   - Trops - x2 and BNP negative. Not volume overloaded, no CHF exacerbation.   - CXR with curious LLL opacity.   - CXR on 11/30: Worsening left pleural effusion/opacity.  - Echo done in October at [facility], but records not in system    # Paroxysmal atrial fibrillation  - On Pradaxa 150mg PO twice daily and ASA 81 mg   - Plavix recently added after a recent TIA last month.   - pt has carotid disease(evaluated by vascular Dr Huerta for carotid endarterectomy in december)- was started on plavix and patient is already on asa and pradaxa.  - f/u vascular on the need of dual antiplatelets given that patient is also on pradaxa for afib history   - Rate controlled     # COPD  - No increase in basal need of O2, no increased cough, fever, chills, or dyspnea so exacerbation is unlikely.   - Continue home inhalers.    # chronic HFpEF  # HTN  # HLD  - Preserved EF according to Dr. Bishop's notes, although no echo report in [place].   - Continue PO diuretics and home BP and cholesterol meds    # Bipoar Disorder  - Mood stable  - Continue home meds Lamictal 100mg PO daily, Seroquel 200mg at bedtime, and Ativan 0.5mg PO twice daily PRN    DVT ppx: Lovenox 40 mg SubQ daily   GI ppx: Pepcid 10mg PO daily    Diet: DASH/TLC   Activity: IAT   Dispo: From home   Code: FULL

## 2020-11-30 NOTE — CONSULT NOTE ADULT - SUBJECTIVE AND OBJECTIVE BOX
Patient is a 75y old  Female who presents with a chief complaint of Odynophagia associated with chest pain (29 Nov 2020 16:06).  Patient stated that she has felt a swollen lump behind the angle of her mandible on the left side and stated that just as she was waiting to be seen in dental, she feels a lump, not nearly as bad, on the right side.       HPI:  Chief Complaint: Chest pain after swallowing. Pain in neck and difficulty swallowing.       Past Medical History: COPD on 5L home O2, paroxysmal afib, carotid stenosis, HFpEF, HTN, HLD, Bipolar disorder      Past Surgical History: None relevant       History of present illness goes back to 6AM on the morning of presentation when the patient first noticed chest pain that was associated with swallowing. She states that she was in her usual state of health the night prior. Of note, she lives with her daughter who is currently in their home quarantining, but the patient states her daughter is not doing a good job and was concerned that she might have COVID, so she went to urgent care and got tested, results pending. Her chest pain was described as severe when she swallowed, radiating to her neck and shoulder so she called EMS and was brought to the ED.        In the ED, her vitals were normal. EMS reported that in transit there was some anterior leads that showed TWI, but repeat EKG in the ED did not show any changes. CXR did however show a LLL opacity, increased from last month's CXR representing a possible developing pneumonia. She also wanted to come to the ED because she is planning to undergo a carotid endarterectomy on 12/15 and was concerned that uf she was ill, she would need to postpone the procedure. Rapid COVID was negative, PCR pending. Admitted by ED for ACS rule-out.       ROS: Denies headache, changes in vision, chest pain, palpitations, shortness of breath, cough, fever, chills, nausea, vomiting, diarrhea, and constipation.  (29 Nov 2020 16:06)      PAST MEDICAL & SURGICAL HISTORY:  Falls    Congestive heart failure    Transient ischemic attack    FLAVIO on CPAP    Bipolar 1 disorder    Allergic reaction    PVD (peripheral vascular disease)    Other emphysema    Other cardiomyopathy    TIA (transient ischemic attack)    COPD (chronic obstructive pulmonary disease)    Depression    Hypertension    History of cholecystectomy        MEDICATIONS  (STANDING):  aDENosine Injectable (ADENOSCAN) 60 milliGRAM(s) IV Bolus once  amLODIPine   Tablet 2.5 milliGRAM(s) Oral daily  amoxicillin  875 milliGRAM(s)/clavulanate 1 Tablet(s) Oral two times a day  aspirin  chewable 81 milliGRAM(s) Oral daily  atorvastatin 20 milliGRAM(s) Oral at bedtime  benzocaine 20% Spray 1 Spray(s) Topical every 6 hours  budesonide 160 MICROgram(s)/formoterol 4.5 MICROgram(s) Inhaler 2 Puff(s) Inhalation two times a day  clopidogrel Tablet 75 milliGRAM(s) Oral daily  dabigatran 150 milliGRAM(s) Oral every 12 hours  famotidine    Tablet 20 milliGRAM(s) Oral daily  furosemide    Tablet 40 milliGRAM(s) Oral daily  gabapentin 300 milliGRAM(s) Oral three times a day  lamoTRIgine 100 milliGRAM(s) Oral daily  metoprolol tartrate 100 milliGRAM(s) Oral two times a day  montelukast 10 milliGRAM(s) Oral daily  QUEtiapine 200 milliGRAM(s) Oral at bedtime    MEDICATIONS  (PRN):  LORazepam     Tablet 0.5 milliGRAM(s) Oral two times a day PRN Anxiety      Allergies    codeine (Other (Moderate))  Depakote (Unknown)  IV Contrast (Anaphylaxis)  losartan (Angioedema)  Risperdal (Other)  verapamil (Short breath; Angioedema)    Intolerances        FAMILY HISTORY:  No pertinent family history in first degree relatives        *SOCIAL HISTORY: ( - ) Tobacco; ( - ) ETOH    Vital Signs Last 24 Hrs  T(C): 36.3 (30 Nov 2020 14:08), Max: 36.9 (29 Nov 2020 17:24)  T(F): 97.3 (30 Nov 2020 14:08), Max: 98.4 (29 Nov 2020 17:24)  HR: 79 (30 Nov 2020 14:08) (66 - 85)  BP: 119/56 (30 Nov 2020 14:08) (119/56 - 192/86)  BP(mean): --  RR: 18 (30 Nov 2020 14:08) (18 - 18)  SpO2: 95% (30 Nov 2020 08:00) (95% - 95%)    LABS:                        13.2   6.17  )-----------( 208      ( 30 Nov 2020 04:37 )             40.9     11-30    142  |  106  |  17  ----------------------------<  88  3.4<L>   |  22  |  0.9    Ca    9.3      30 Nov 2020 04:37  Mg     1.8     11-30    TPro  6.4  /  Alb  3.8  /  TBili  0.4  /  DBili  x   /  AST  45<H>  /  ALT  28  /  AlkPhos  206<H>  11-30    Platelet Count - Automated: 208 K/uL [130 - 400] (11-30 @ 04:37)  WBC Count: 6.17 K/uL [4.80 - 10.80] (11-30 @ 04:37)  WBC Count: 8.25 K/uL [4.80 - 10.80] (11-29 @ 13:15)  Platelet Count - Automated: 237 K/uL [130 - 400] (11-29 @ 13:15)    EOE:  TMJ ( - ) clicks                     ( - ) pops                     ( - ) crepitus             Mandible <<FROM>>             Facial bones and MOM <<grossly intact>>             ( - ) trismus             ( + ) lymphadenopathy - swelling of cervical lymphnodes - palpable bilaterally, but more painful for patient on left side.              ( - ) swelling             ( - ) asymmetry             ( + ) palpation             ( - ) dyspnea             ( + ) dysphagia             ( - ) loss of consciousness    IOE: edentulous           hard/soft palate:  ( - ) palatal torus, <<No pathology noted>>           tongue/FOM <<No pathology noted>>           labial/buccal mucosa <<No pathology noted>>           ( - ) percussion           ( - ) palpation           ( - ) swelling            ( - ) abscess           ( - ) sinus tract    Dentition present: complete edentulism  *DENTAL RADIOGRAPHS: 1 panorex    *ASSESSMENT: TMJ exam completed. Exam reveals bilateral palpable cervical lymphnodes, with the left being more painful and firmer. Evaluation of the floor of mouth reveals no pathology. Evaluation of panorex radiograph shows no dental origination of neck pain.     *PLAN: Recommend CT Scan and evaluation by ENT.       RECOMMENDATIONS:  1) CT Scan and ENT evaluation.   2) Dental F/U with outpatient dentist for comprehensive dental care.      Daiana Tijerina

## 2020-12-01 LAB
ALBUMIN SERPL ELPH-MCNC: 3.8 G/DL — SIGNIFICANT CHANGE UP (ref 3.5–5.2)
ALP SERPL-CCNC: 208 U/L — HIGH (ref 30–115)
ALT FLD-CCNC: 27 U/L — SIGNIFICANT CHANGE UP (ref 0–41)
ANION GAP SERPL CALC-SCNC: 13 MMOL/L — SIGNIFICANT CHANGE UP (ref 7–14)
AST SERPL-CCNC: 41 U/L — SIGNIFICANT CHANGE UP (ref 0–41)
BASOPHILS # BLD AUTO: 0.03 K/UL — SIGNIFICANT CHANGE UP (ref 0–0.2)
BASOPHILS NFR BLD AUTO: 0.5 % — SIGNIFICANT CHANGE UP (ref 0–1)
BILIRUB SERPL-MCNC: 0.4 MG/DL — SIGNIFICANT CHANGE UP (ref 0.2–1.2)
BUN SERPL-MCNC: 25 MG/DL — HIGH (ref 10–20)
CALCIUM SERPL-MCNC: 9 MG/DL — SIGNIFICANT CHANGE UP (ref 8.5–10.1)
CHLORIDE SERPL-SCNC: 106 MMOL/L — SIGNIFICANT CHANGE UP (ref 98–110)
CO2 SERPL-SCNC: 22 MMOL/L — SIGNIFICANT CHANGE UP (ref 17–32)
CREAT SERPL-MCNC: 1 MG/DL — SIGNIFICANT CHANGE UP (ref 0.7–1.5)
EOSINOPHIL # BLD AUTO: 0.29 K/UL — SIGNIFICANT CHANGE UP (ref 0–0.7)
EOSINOPHIL NFR BLD AUTO: 5.1 % — SIGNIFICANT CHANGE UP (ref 0–8)
GLUCOSE SERPL-MCNC: 91 MG/DL — SIGNIFICANT CHANGE UP (ref 70–99)
HCT VFR BLD CALC: 39.8 % — SIGNIFICANT CHANGE UP (ref 37–47)
HGB BLD-MCNC: 13 G/DL — SIGNIFICANT CHANGE UP (ref 12–16)
IMM GRANULOCYTES NFR BLD AUTO: 0.2 % — SIGNIFICANT CHANGE UP (ref 0.1–0.3)
LYMPHOCYTES # BLD AUTO: 1.21 K/UL — SIGNIFICANT CHANGE UP (ref 1.2–3.4)
LYMPHOCYTES # BLD AUTO: 21.3 % — SIGNIFICANT CHANGE UP (ref 20.5–51.1)
MAGNESIUM SERPL-MCNC: 1.9 MG/DL — SIGNIFICANT CHANGE UP (ref 1.8–2.4)
MCHC RBC-ENTMCNC: 31.2 PG — HIGH (ref 27–31)
MCHC RBC-ENTMCNC: 32.7 G/DL — SIGNIFICANT CHANGE UP (ref 32–37)
MCV RBC AUTO: 95.4 FL — SIGNIFICANT CHANGE UP (ref 81–99)
MONOCYTES # BLD AUTO: 0.66 K/UL — HIGH (ref 0.1–0.6)
MONOCYTES NFR BLD AUTO: 11.6 % — HIGH (ref 1.7–9.3)
NEUTROPHILS # BLD AUTO: 3.48 K/UL — SIGNIFICANT CHANGE UP (ref 1.4–6.5)
NEUTROPHILS NFR BLD AUTO: 61.3 % — SIGNIFICANT CHANGE UP (ref 42.2–75.2)
NRBC # BLD: 0 /100 WBCS — SIGNIFICANT CHANGE UP (ref 0–0)
PLATELET # BLD AUTO: 202 K/UL — SIGNIFICANT CHANGE UP (ref 130–400)
POTASSIUM SERPL-MCNC: 3.3 MMOL/L — LOW (ref 3.5–5)
POTASSIUM SERPL-SCNC: 3.3 MMOL/L — LOW (ref 3.5–5)
PROT SERPL-MCNC: 6 G/DL — SIGNIFICANT CHANGE UP (ref 6–8)
RBC # BLD: 4.17 M/UL — LOW (ref 4.2–5.4)
RBC # FLD: 13.8 % — SIGNIFICANT CHANGE UP (ref 11.5–14.5)
SODIUM SERPL-SCNC: 141 MMOL/L — SIGNIFICANT CHANGE UP (ref 135–146)
WBC # BLD: 5.68 K/UL — SIGNIFICANT CHANGE UP (ref 4.8–10.8)
WBC # FLD AUTO: 5.68 K/UL — SIGNIFICANT CHANGE UP (ref 4.8–10.8)

## 2020-12-01 PROCEDURE — 99232 SBSQ HOSP IP/OBS MODERATE 35: CPT

## 2020-12-01 PROCEDURE — 93016 CV STRESS TEST SUPVJ ONLY: CPT

## 2020-12-01 PROCEDURE — 99223 1ST HOSP IP/OBS HIGH 75: CPT | Mod: 25

## 2020-12-01 PROCEDURE — 31575 DIAGNOSTIC LARYNGOSCOPY: CPT

## 2020-12-01 PROCEDURE — 99222 1ST HOSP IP/OBS MODERATE 55: CPT

## 2020-12-01 PROCEDURE — 93018 CV STRESS TEST I&R ONLY: CPT

## 2020-12-01 PROCEDURE — 78452 HT MUSCLE IMAGE SPECT MULT: CPT | Mod: 26

## 2020-12-01 RX ORDER — REGADENOSON 0.08 MG/ML
0.4 INJECTION, SOLUTION INTRAVENOUS ONCE
Refills: 0 | Status: COMPLETED | OUTPATIENT
Start: 2020-12-01 | End: 2020-12-01

## 2020-12-01 RX ORDER — ACETAMINOPHEN 500 MG
650 TABLET ORAL ONCE
Refills: 0 | Status: COMPLETED | OUTPATIENT
Start: 2020-12-01 | End: 2020-12-01

## 2020-12-01 RX ORDER — POTASSIUM CHLORIDE 20 MEQ
40 PACKET (EA) ORAL EVERY 4 HOURS
Refills: 0 | Status: COMPLETED | OUTPATIENT
Start: 2020-12-01 | End: 2020-12-01

## 2020-12-01 RX ADMIN — FAMOTIDINE 20 MILLIGRAM(S): 10 INJECTION INTRAVENOUS at 11:43

## 2020-12-01 RX ADMIN — Medication 40 MILLIEQUIVALENT(S): at 11:43

## 2020-12-01 RX ADMIN — GABAPENTIN 300 MILLIGRAM(S): 400 CAPSULE ORAL at 05:41

## 2020-12-01 RX ADMIN — Medication 40 MILLIEQUIVALENT(S): at 14:47

## 2020-12-01 RX ADMIN — Medication 1 SPRAY(S): at 17:21

## 2020-12-01 RX ADMIN — LAMOTRIGINE 100 MILLIGRAM(S): 25 TABLET, ORALLY DISINTEGRATING ORAL at 12:54

## 2020-12-01 RX ADMIN — BUDESONIDE AND FORMOTEROL FUMARATE DIHYDRATE 2 PUFF(S): 160; 4.5 AEROSOL RESPIRATORY (INHALATION) at 07:54

## 2020-12-01 RX ADMIN — Medication 81 MILLIGRAM(S): at 11:43

## 2020-12-01 RX ADMIN — Medication 0.5 MILLIGRAM(S): at 13:09

## 2020-12-01 RX ADMIN — Medication 100 MILLIGRAM(S): at 17:21

## 2020-12-01 RX ADMIN — BUDESONIDE AND FORMOTEROL FUMARATE DIHYDRATE 2 PUFF(S): 160; 4.5 AEROSOL RESPIRATORY (INHALATION) at 20:43

## 2020-12-01 RX ADMIN — Medication 650 MILLIGRAM(S): at 19:30

## 2020-12-01 RX ADMIN — AMLODIPINE BESYLATE 2.5 MILLIGRAM(S): 2.5 TABLET ORAL at 05:41

## 2020-12-01 RX ADMIN — DABIGATRAN ETEXILATE MESYLATE 150 MILLIGRAM(S): 150 CAPSULE ORAL at 17:21

## 2020-12-01 RX ADMIN — Medication 1 TABLET(S): at 17:20

## 2020-12-01 RX ADMIN — CLOPIDOGREL BISULFATE 75 MILLIGRAM(S): 75 TABLET, FILM COATED ORAL at 11:43

## 2020-12-01 RX ADMIN — Medication 1 TABLET(S): at 05:41

## 2020-12-01 RX ADMIN — ATORVASTATIN CALCIUM 20 MILLIGRAM(S): 80 TABLET, FILM COATED ORAL at 21:59

## 2020-12-01 RX ADMIN — Medication 40 MILLIGRAM(S): at 05:41

## 2020-12-01 RX ADMIN — Medication 100 MILLIGRAM(S): at 05:41

## 2020-12-01 RX ADMIN — Medication 1 SPRAY(S): at 05:42

## 2020-12-01 RX ADMIN — DABIGATRAN ETEXILATE MESYLATE 150 MILLIGRAM(S): 150 CAPSULE ORAL at 05:41

## 2020-12-01 RX ADMIN — Medication 0.5 MILLIGRAM(S): at 22:15

## 2020-12-01 RX ADMIN — Medication 1 SPRAY(S): at 11:44

## 2020-12-01 RX ADMIN — GABAPENTIN 300 MILLIGRAM(S): 400 CAPSULE ORAL at 13:04

## 2020-12-01 RX ADMIN — REGADENOSON 0.4 MILLIGRAM(S): 0.08 INJECTION, SOLUTION INTRAVENOUS at 10:09

## 2020-12-01 RX ADMIN — Medication 650 MILLIGRAM(S): at 18:26

## 2020-12-01 RX ADMIN — MONTELUKAST 10 MILLIGRAM(S): 4 TABLET, CHEWABLE ORAL at 11:45

## 2020-12-01 RX ADMIN — GABAPENTIN 300 MILLIGRAM(S): 400 CAPSULE ORAL at 21:59

## 2020-12-01 RX ADMIN — QUETIAPINE FUMARATE 200 MILLIGRAM(S): 200 TABLET, FILM COATED ORAL at 21:59

## 2020-12-01 NOTE — CONSULT NOTE ADULT - SUBJECTIVE AND OBJECTIVE BOX
Patient is a 75y old  Female who presents with a chief complaint of Odynophagia associated with chest pain (01 Dec 2020 12:07)      HPI:  Chief Complaint: Chest pain after swallowing       Past Medical History: COPD on 5L home O2, paroxysmal afib, carotid stenosis, HFpEF, HTN, HLD, Bipolar disorder      Past Surgical History: None relevant       History of present illness goes back to 6AM on the morning of presentation when the patient first noticed chest pain that was associated with swallowing. She states that she was in her usual state of health the night prior. Of note, she lives with her daughter who is currently in their home quarantining, but the patient states her daughter is not doing a good job and was concerned that she might have COVID, so she went to urgent care and got tested, results pending. Her chest pain was described as severe when she swallowed, radiating to her neck and shoulder so she called EMS and was brought to the ED.        In the ED, her vitals were normal. EMS reported that in transit there was some anterior leads that showed TWI, but repeat EKG in the ED did not show any changes. CXR did however show a LLL opacity, increased from last month's CXR representing a possible developing pneumonia. She also wanted to come to the ED because she is planning to undergo a carotid endarterectomy on 12/15 and was concerned that uf she was ill, she would need to postpone the procedure. Rapid COVID was negative, PCR pending. Admitted by ED for ACS rule-out.       ROS: Denies headache, changes in vision, chest pain, palpitations, shortness of breath, cough, fever, chills, nausea, vomiting, diarrhea, and constipation.  (29 Nov 2020 16:06)      PAST MEDICAL & SURGICAL HISTORY:  Falls    Congestive heart failure    Transient ischemic attack    FLAVIO on CPAP    Bipolar 1 disorder    Allergic reaction    PVD (peripheral vascular disease)    Other emphysema    Other cardiomyopathy    TIA (transient ischemic attack)    COPD (chronic obstructive pulmonary disease)    Depression    Hypertension    History of cholecystectomy        SOCIAL HX:   Smoking    quit 15 year ago ( 60 py smoker)                   ETOH denies                           Other    FAMILY HISTORY:  No pertinent family history in first degree relatives    .  No cardiovascular or pulmonary family history     REVIEW OF SYSTEMS:    All ROS are negative exept per HPI       Allergies    codeine (Other (Moderate))  Depakote (Unknown)  IV Contrast (Anaphylaxis)  losartan (Angioedema)  Risperdal (Other)  verapamil (Short breath; Angioedema)    Intolerances          PHYSICAL EXAM  Vital Signs Last 24 Hrs  T(C): 35.6 (01 Dec 2020 12:40), Max: 36.5 (01 Dec 2020 05:18)  T(F): 96.1 (01 Dec 2020 12:40), Max: 97.7 (01 Dec 2020 05:18)  HR: 75 (01 Dec 2020 12:40) (69 - 85)  BP: 117/56 (01 Dec 2020 12:40) (117/56 - 152/67)  BP(mean): --  RR: 18 (01 Dec 2020 12:40) (18 - 18)  SpO2: 96% (30 Nov 2020 20:05) (96% - 96%)    CONSTITUTIONAL:  chronically ill appearing sitting in chair in  NAD    ENT:   Airway patent,   No thrush  On Room Air  Submandibular lymphadenopathy     EYES:   Clear bilaterally,   pupils equal,     CARDIAC:   Normal rate,   regular rhythm.    +1 edema    RESPIRATORY:   No wheezing   Normal chest expansion  Not tachypneic,  No use of accessory muscles    GASTROINTESTINAL:  Abdomen soft, non-tender,   No guarding,       MUSCULOSKELETAL:   Range of motion is not limited,  No clubbing, cyanosis    NEUROLOGICAL:   Alert and oriented   No motor deficits.    SKIN:   Skin normal color for race,   No evidence of rash.    HEME LYMPH:   No cervical  lymphadenopathy.  Submandibular lymphadenopathy          LABS:                          13.0   5.68  )-----------( 202      ( 01 Dec 2020 06:17 )             39.8                                               12-01    141  |  106  |  25<H>  ----------------------------<  91  3.3<L>   |  22  |  1.0    Ca    9.0      01 Dec 2020 06:17  Mg     1.9     12-01    TPro  6.0  /  Alb  3.8  /  TBili  0.4  /  DBili  x   /  AST  41  /  ALT  27  /  AlkPhos  208<H>  12-01                                                 CARDIAC MARKERS ( 29 Nov 2020 21:30 )  x     / <0.01 ng/mL / x     / x     / x      CARDIAC MARKERS ( 29 Nov 2020 13:15 )  x     / <0.01 ng/mL / x     / x     / x                                                LIVER FUNCTIONS - ( 01 Dec 2020 06:17 )  Alb: 3.8 g/dL / Pro: 6.0 g/dL / ALK PHOS: 208 U/L / ALT: 27 U/L / AST: 41 U/L / GGT: x                                                                                                MEDICATIONS  (STANDING):  amLODIPine   Tablet 2.5 milliGRAM(s) Oral daily  amoxicillin  875 milliGRAM(s)/clavulanate 1 Tablet(s) Oral two times a day  aspirin  chewable 81 milliGRAM(s) Oral daily  atorvastatin 20 milliGRAM(s) Oral at bedtime  benzocaine 20% Spray 1 Spray(s) Topical every 6 hours  budesonide 160 MICROgram(s)/formoterol 4.5 MICROgram(s) Inhaler 2 Puff(s) Inhalation two times a day  clopidogrel Tablet 75 milliGRAM(s) Oral daily  dabigatran 150 milliGRAM(s) Oral every 12 hours  famotidine    Tablet 20 milliGRAM(s) Oral daily  furosemide    Tablet 40 milliGRAM(s) Oral daily  gabapentin 300 milliGRAM(s) Oral three times a day  lamoTRIgine 100 milliGRAM(s) Oral daily  metoprolol tartrate 100 milliGRAM(s) Oral two times a day  montelukast 10 milliGRAM(s) Oral daily  potassium chloride    Tablet ER 40 milliEquivalent(s) Oral every 4 hours  QUEtiapine 200 milliGRAM(s) Oral at bedtime  regadenoson Injectable 0.4 milliGRAM(s) IV Push once    MEDICATIONS  (PRN):  LORazepam     Tablet 0.5 milliGRAM(s) Oral two times a day PRN Anxiety      X-Rays reviewed:    CXR interpreted by me:

## 2020-12-01 NOTE — PROGRESS NOTE ADULT - ASSESSMENT
A/P   Chest pain atypical ruled out for MI   nuc stress test no ischemia       patient says that she is to get carotid disease by Dr Huerta in december and that vascular started her on plavix and patient is already on asa and pradaxa. vascular to comment on the need of dual antiplatelets given that patient is also on pradaxa for afib history. also if they would want to proceed with any intervention as in patient.    patient was at [facility] in october and had TIA symptoms neuro at that time did not want plavix     Afib chronic now in NSR: on pradaxa 150 BID     patient has pain  in her neck under her left ear on exam I appreciated a LN which is tender:  treating with augmentin empirically and seen by dental no evidence of infection. per radiology US not a good test for this and recommended CTA neck but patient gets anaphylaxis with contrast. will wait for ENT if indicated then will need to prep patient for contrast       COPD stable pulm eval for pre-op eval if vascular would wan to intervene     DVT PPX

## 2020-12-01 NOTE — CONSULT NOTE ADULT - ASSESSMENT
75 y.o F admitted to medicine with chest pain and odynophagia ENT called to evaluate Pt. fo B/L cervical LAD  Pt. c/o dysphagia and odynophagia since 11/29/20 with some improvement today Pt. was able to eat soft food and drink water.        Plan:  FFL by Dr. Vaughn   F/U CT neck w/wo IVC - ordered  Improve oral hydration   Consider SLP calli   ENT attending will F/U    75 y.o F admitted to medicine with chest pain and odynophagia ENT called to evaluate Pt. fo B/L cervical LAD  Pt. c/o dysphagia and odynophagia since 11/29/20 with some improvement today Pt. was able to eat soft food and drink water.        Plan:  FFL performed by Dr. Vaughn - [facility].  IF CT A/P not an option might obtain MRI of neck soft tissues w/o IVC  Improve oral hydration   Consider SLP eval   ENT attending will F/U

## 2020-12-01 NOTE — PROGRESS NOTE ADULT - ASSESSMENT
Ms. Henderson is a 75F with a PMH of COPD on home oxygen, pAfib, HFpEF, HTN, HLD, bipolar disorder presented to the ED for chest pain associated with swallowing, found to have a LLL opacity on CXR and recent COVID exposure admitted to rule out ACS.     #Neck pain  - swollen lump at the left side of the neck, r/o dental infection vs swollen lymph node  - c/w on augmentin  - will hold CT neck with IV contrast( pt had anaphylaxis with contrast previously)  - Dental eval appreciated (F/U with outpatient dentist for comprehensive dental care)  - F/u ENT consult for possible ear infection    # Chest pain, most likely pleuritic (Dr. Hoffmann Cardiologist)  - Associated with odynophagia, unlikely ACS. EMS/Ed team said they saw TWI, but repeat EKG baseline.   - Trops - x2 and BNP negative. Not volume overloaded, no CHF exacerbation.   - CXR:  LLL opacity.   - CXR on 11/30: Worsening left pleural effusion/opacity.  - Echo done in October at [facility], but records not in system  - f/u ECHO   - stress test 12/1/2020: No ischemic EKG changes    # Paroxysmal atrial fibrillation  - On Pradaxa 150mg PO twice daily and ASA 81 mg   - Plavix recently added after a recent TIA last month.   - pt has carotid disease(evaluated by vascular Dr Huerta for carotid endarterectomy in december)- was started on plavix and patient is already on asa and pradaxa.  - f/u vascular on the need of dual antiplatelets given that patient is also on pradaxa for afib history   - Rate controlled     # COPD  - No increase in basal need of O2, no increased cough, fever, chills, or dyspnea so exacerbation is unlikely.   - Continue home inhalers.    # chronic HFpEF  # HTN  # HLD  - Preserved EF according to Dr. Bishop's notes, although no echo report in [place].   - Continue PO diuretics and home BP and cholesterol meds    # Bipoar Disorder  - Mood stable  - Continue home meds Lamictal 100mg PO daily, Seroquel 200mg at bedtime, and Ativan 0.5mg PO twice daily PRN    DVT ppx: Lovenox 40 mg SubQ daily   GI ppx: Pepcid 10mg PO daily    Diet: DASH/TLC   Activity: IAT   Dispo: From home   Code: FULL

## 2020-12-01 NOTE — PROGRESS NOTE ADULT - SUBJECTIVE AND OBJECTIVE BOX
SUBJECTIVE:    Patient is a 75y old Female who presents with a chief complaint of Odynophagia associated with chest pain (30 Nov 2020 18:06)    Currently admitted to medicine with the primary diagnosis of Chest pain.  Today is hospital day 2d. This morning she is resting comfortably in bed and reports no new issues or overnight events. Pt reports that her left sided chest pain is resolved now. Patient still complains of neck pain and odynophagia. Pt reported that she has allergy to contrast- anaphylaxis.      PAST MEDICAL & SURGICAL HISTORY  Falls    Congestive heart failure    Transient ischemic attack    FLAVIO on CPAP    Bipolar 1 disorder    Allergic reaction    PVD (peripheral vascular disease)    Other emphysema    Other cardiomyopathy    TIA (transient ischemic attack)    COPD (chronic obstructive pulmonary disease)    Depression    Hypertension    History of cholecystectomy    ALLERGIES:  codeine (Other (Moderate))  Depakote (Unknown)  IV Contrast (Anaphylaxis)  losartan (Angioedema)  Risperdal (Other)  verapamil (Short breath; Angioedema)    MEDICATIONS:  STANDING MEDICATIONS  amLODIPine   Tablet 2.5 milliGRAM(s) Oral daily  amoxicillin  875 milliGRAM(s)/clavulanate 1 Tablet(s) Oral two times a day  aspirin  chewable 81 milliGRAM(s) Oral daily  atorvastatin 20 milliGRAM(s) Oral at bedtime  benzocaine 20% Spray 1 Spray(s) Topical every 6 hours  budesonide 160 MICROgram(s)/formoterol 4.5 MICROgram(s) Inhaler 2 Puff(s) Inhalation two times a day  clopidogrel Tablet 75 milliGRAM(s) Oral daily  dabigatran 150 milliGRAM(s) Oral every 12 hours  famotidine    Tablet 20 milliGRAM(s) Oral daily  furosemide    Tablet 40 milliGRAM(s) Oral daily  gabapentin 300 milliGRAM(s) Oral three times a day  lamoTRIgine 100 milliGRAM(s) Oral daily  metoprolol tartrate 100 milliGRAM(s) Oral two times a day  montelukast 10 milliGRAM(s) Oral daily  potassium chloride    Tablet ER 40 milliEquivalent(s) Oral every 4 hours  QUEtiapine 200 milliGRAM(s) Oral at bedtime  regadenoson Injectable 0.4 milliGRAM(s) IV Push once    PRN MEDICATIONS  LORazepam     Tablet 0.5 milliGRAM(s) Oral two times a day PRN    VITALS:   T(F): 97.7  HR: 85  BP: 152/67  RR: 18  SpO2: 96%    LABS:                        13.0   5.68  )-----------( 202      ( 01 Dec 2020 06:17 )             39.8     12-01    141  |  106  |  25<H>  ----------------------------<  91  3.3<L>   |  22  |  1.0    Ca    9.0      01 Dec 2020 06:17  Mg     1.9     12-01    TPro  6.0  /  Alb  3.8  /  TBili  0.4  /  DBili  x   /  AST  41  /  ALT  27  /  AlkPhos  208<H>  12-01              CARDIAC MARKERS ( 29 Nov 2020 21:30 )  x     / <0.01 ng/mL / x     / x     / x      CARDIAC MARKERS ( 29 Nov 2020 13:15 )  x     / <0.01 ng/mL / x     / x     / x          RADIOLOGY:  < from: CT Brain Stroke Protocol (10.14.20 @ 16:50) >  IMPRESSION:  No acute intracranial pathology. No evidence of midline shift, mass effect or intracranial hemorrhage.    Mild chronic microvascular-type changes.      < end of copied text >    < from: Xray Chest 1 View- PORTABLE-Routine (Xray Chest 1 View- PORTABLE-Routine in AM.) (11.30.20 @ 06:32) >  IMPRESSION:    Worsening left pleural effusion/opacity.    < end of copied text >    PHYSICAL EXAM:  GEN: No acute distress,   Neck: swollen left neck lump is palpated, tender to touch. Right submandibular lymph node appreciated  LUNGS: mild rhonchi b/l  HEART: Regular, +S1 s2  ABD: Soft, non-tender, non-distended.   EXT: NC/NC/NE/2+PP/BRADY/Skin Intact.   NEURO: AAOX3

## 2020-12-01 NOTE — CONSULT NOTE ADULT - SUBJECTIVE AND OBJECTIVE BOX
VASCULAR SURGERY CONSULT NOTE    Patient: SHAUN COATES , 75y ([birth year])Female   MRN: [id number]  Location: [address] A  Visit: 11-29-20 Inpatient  Date: 12-01-20 @ 11:07    HPI:  Chief Complaint: Chest pain after swallowing       Past Medical History: L ICA STENOSIS >80%, COPD on 5L home O2, paroxysmal afib, carotid stenosis, HFpEF, HTN, HLD, Bipolar disorder    Patient is a 75 year old female with hx as above who presented to the ED 2 days ago for chest pain, and odynophagia as well as L neck pain and swelling. Vascular team was consulted because patient has seen Dr. Huerta in the past for evaluation of L ICA Stenosis after a TIA. Patient is scheduled for TCAR on 12/15. Patient was started on Plavix 75 mg on 11/12 during her outpatient visit. Patient is also on Pradaxa 150 mg BID for AFib, and ASA 81. Primary team is concerned that patient is on all 3 of these medications, and they were inquiring if we would do her procedure while she was admitted.      PAST MEDICAL & SURGICAL HISTORY:  Falls    Congestive heart failure    Transient ischemic attack    FLAVIO on CPAP    Bipolar 1 disorder    Allergic reaction    PVD (peripheral vascular disease)    Other emphysema    Other cardiomyopathy    TIA (transient ischemic attack)    COPD (chronic obstructive pulmonary disease)    Depression    Hypertension    History of cholecystectomy        Home Medications:  amLODIPine 2.5 mg oral tablet: 1 tab(s) orally once a day (29 Nov 2020 16:46)  aspirin 81 mg oral tablet: 1 tab(s) orally once a day (29 Nov 2020 16:46)  atorvastatin 20 mg oral tablet: 1 tab(s) orally once a day (29 Nov 2020 16:46)  budesonide-formoterol 160 mcg-4.5 mcg/inh inhalation aerosol: 2 puff(s) inhaled 2 times a day (29 Nov 2020 16:46)  furosemide 40 mg oral tablet: 1 tab(s) orally once a day (29 Nov 2020 16:46)  lamoTRIgine 100 mg oral tablet, extended release: 1 tab(s) orally once a day (29 Nov 2020 16:46)  LORazepam 0.5 mg oral tablet: 1 tab(s) orally 2 times a day, As Needed (29 Nov 2020 16:46)  Metoprolol Tartrate 100 mg oral tablet: 1 tab(s) orally 2 times a day (29 Nov 2020 16:46)  montelukast 10 mg oral tablet: 1 tab(s) orally once a day (29 Nov 2020 16:46)  Pepcid 20 mg oral tablet: 1 tab(s) orally once a day (29 Nov 2020 16:46)  Plavix 75 mg oral tablet: 1 tab(s) orally once a day (29 Nov 2020 16:46)  QUEtiapine 200 mg oral tablet: 1 tab(s) orally once a day (at bedtime) (29 Nov 2020 16:46)        VITALS:  T(F): 97.7 (12-01-20 @ 05:18), Max: 97.7 (12-01-20 @ 05:18)  HR: 85 (12-01-20 @ 05:18) (69 - 85)  BP: 152/67 (12-01-20 @ 05:18) (119/56 - 152/67)  RR: 18 (12-01-20 @ 05:18) (18 - 18)  SpO2: 96% (11-30-20 @ 20:05) (96% - 96%)    PHYSICAL EXAM:  GENERAL: NAD, lying in bed comfortably  HEAD:  Atraumatic, Normocephalic  EYES: EOMI, PERRLA, conjunctiva and sclera clear  ENT: Moist mucous membranes  NECK: Supple, No JVD, +TTP of submandibular glands.   CHEST/LUNG: Clear to auscultation bilaterally; Unlabored respirations  HEART: Regular rate and rhythm; No murmurs, rubs, or gallops  ABDOMEN: Bowel sounds present; Soft, Nontender, Nondistended. No hepatomegaly  EXTREMITIES:  2+ Peripheral Pulses, brisk capillary refill. No clubbing, cyanosis, or edema  NERVOUS SYSTEM:  Alert & Oriented X3, speech clear. No deficits   MSK: FROM all 4 extremities, full and equal strength  SKIN: No rashes or lesions    MEDICATIONS  (STANDING):  amLODIPine   Tablet 2.5 milliGRAM(s) Oral daily  amoxicillin  875 milliGRAM(s)/clavulanate 1 Tablet(s) Oral two times a day  aspirin  chewable 81 milliGRAM(s) Oral daily  atorvastatin 20 milliGRAM(s) Oral at bedtime  benzocaine 20% Spray 1 Spray(s) Topical every 6 hours  budesonide 160 MICROgram(s)/formoterol 4.5 MICROgram(s) Inhaler 2 Puff(s) Inhalation two times a day  clopidogrel Tablet 75 milliGRAM(s) Oral daily  dabigatran 150 milliGRAM(s) Oral every 12 hours  famotidine    Tablet 20 milliGRAM(s) Oral daily  furosemide    Tablet 40 milliGRAM(s) Oral daily  gabapentin 300 milliGRAM(s) Oral three times a day  lamoTRIgine 100 milliGRAM(s) Oral daily  metoprolol tartrate 100 milliGRAM(s) Oral two times a day  montelukast 10 milliGRAM(s) Oral daily  potassium chloride    Tablet ER 40 milliEquivalent(s) Oral every 4 hours  QUEtiapine 200 milliGRAM(s) Oral at bedtime  regadenoson Injectable 0.4 milliGRAM(s) IV Push once    MEDICATIONS  (PRN):  LORazepam     Tablet 0.5 milliGRAM(s) Oral two times a day PRN Anxiety      LAB/STUDIES:                        13.0   5.68  )-----------( 202      ( 01 Dec 2020 06:17 )             39.8     12-01    141  |  106  |  25<H>  ----------------------------<  91  3.3<L>   |  22  |  1.0    Ca    9.0      01 Dec 2020 06:17  Mg     1.9     12-01    TPro  6.0  /  Alb  3.8  /  TBili  0.4  /  DBili  x   /  AST  41  /  ALT  27  /  AlkPhos  208<H>  12-01      LIVER FUNCTIONS - ( 01 Dec 2020 06:17 )  Alb: 3.8 g/dL / Pro: 6.0 g/dL / ALK PHOS: 208 U/L / ALT: 27 U/L / AST: 41 U/L / GGT: x             CARDIAC MARKERS ( 29 Nov 2020 21:30 )  x     / <0.01 ng/mL / x     / x     / x      CARDIAC MARKERS ( 29 Nov 2020 13:15 )  x     / <0.01 ng/mL / x     / x     / x                  IMAGING:  ECHO, STRESS TEST, AND US OF NECK PENDING    ASSESSMENT:  75yF w/ PMHx of  LICA Stenosis and TIA, patient of Dr. Huerta, scheduled for TCAR 12/15, here now with chest pain. Consulted to possible expedite TCAR, and reevaluate her medications Plavix, Pradaxa, and ASA. Physical exam findings, imaging, and labs as documented above.     PLAN:  - Attending/Fellow to see  -  -    Above plan discussed with Dr. Huerta, Vascular Fellow, patient and primary team   VASCULAR SURGERY CONSULT NOTE    Patient: SHAUN COATES , 75y ([birth year])Female   MRN: [id number]  Location: [address]  Visit: 11-29-20 Inpatient  Date: 12-01-20 @ 11:07    HPI:  Chief Complaint: Chest pain after swallowing       Past Medical History: L ICA STENOSIS >80%, COPD on 5L home O2, paroxysmal afib, carotid stenosis, HFpEF, HTN, HLD, Bipolar disorder    Patient is a 75 year old female with hx as above who presented to the ED 2 days ago for chest pain, and odynophagia as well as L neck pain and swelling. Vascular team was consulted because patient has seen Dr. Huerta in the past for evaluation of L ICA Stenosis after a TIA. Patient is scheduled for TCAR on 12/15. Patient was started on Plavix 75 mg on 11/12 during her outpatient visit. Patient is also on Pradaxa 150 mg BID for AFib, and ASA 81. Primary team is concerned that patient is on all 3 of these medications, and they were inquiring if we would do her procedure while she was admitted.      PAST MEDICAL & SURGICAL HISTORY:  Falls    Congestive heart failure    Transient ischemic attack    FLAVIO on CPAP    Bipolar 1 disorder    Allergic reaction    PVD (peripheral vascular disease)    Other emphysema    Other cardiomyopathy    TIA (transient ischemic attack)    COPD (chronic obstructive pulmonary disease)    Depression    Hypertension    History of cholecystectomy        Home Medications:  amLODIPine 2.5 mg oral tablet: 1 tab(s) orally once a day (29 Nov 2020 16:46)  aspirin 81 mg oral tablet: 1 tab(s) orally once a day (29 Nov 2020 16:46)  atorvastatin 20 mg oral tablet: 1 tab(s) orally once a day (29 Nov 2020 16:46)  budesonide-formoterol 160 mcg-4.5 mcg/inh inhalation aerosol: 2 puff(s) inhaled 2 times a day (29 Nov 2020 16:46)  furosemide 40 mg oral tablet: 1 tab(s) orally once a day (29 Nov 2020 16:46)  lamoTRIgine 100 mg oral tablet, extended release: 1 tab(s) orally once a day (29 Nov 2020 16:46)  LORazepam 0.5 mg oral tablet: 1 tab(s) orally 2 times a day, As Needed (29 Nov 2020 16:46)  Metoprolol Tartrate 100 mg oral tablet: 1 tab(s) orally 2 times a day (29 Nov 2020 16:46)  montelukast 10 mg oral tablet: 1 tab(s) orally once a day (29 Nov 2020 16:46)  Pepcid 20 mg oral tablet: 1 tab(s) orally once a day (29 Nov 2020 16:46)  Plavix 75 mg oral tablet: 1 tab(s) orally once a day (29 Nov 2020 16:46)  QUEtiapine 200 mg oral tablet: 1 tab(s) orally once a day (at bedtime) (29 Nov 2020 16:46)        VITALS:  T(F): 97.7 (12-01-20 @ 05:18), Max: 97.7 (12-01-20 @ 05:18)  HR: 85 (12-01-20 @ 05:18) (69 - 85)  BP: 152/67 (12-01-20 @ 05:18) (119/56 - 152/67)  RR: 18 (12-01-20 @ 05:18) (18 - 18)  SpO2: 96% (11-30-20 @ 20:05) (96% - 96%)    PHYSICAL EXAM:  GENERAL: NAD, lying in bed comfortably  HEAD:  Atraumatic, Normocephalic  EYES: EOMI, PERRLA, conjunctiva and sclera clear  ENT: Moist mucous membranes  NECK: Supple, No JVD, +TTP of submandibular glands.   CHEST/LUNG: Clear to auscultation bilaterally; Unlabored respirations  HEART: Regular rate and rhythm; No murmurs, rubs, or gallops  ABDOMEN: Bowel sounds present; Soft, Nontender, Nondistended. No hepatomegaly  EXTREMITIES:  2+ Peripheral Pulses, brisk capillary refill. No clubbing, cyanosis, or edema  NERVOUS SYSTEM:  Alert & Oriented X3, speech clear. No deficits   MSK: FROM all 4 extremities, full and equal strength  SKIN: No rashes or lesions    MEDICATIONS  (STANDING):  amLODIPine   Tablet 2.5 milliGRAM(s) Oral daily  amoxicillin  875 milliGRAM(s)/clavulanate 1 Tablet(s) Oral two times a day  aspirin  chewable 81 milliGRAM(s) Oral daily  atorvastatin 20 milliGRAM(s) Oral at bedtime  benzocaine 20% Spray 1 Spray(s) Topical every 6 hours  budesonide 160 MICROgram(s)/formoterol 4.5 MICROgram(s) Inhaler 2 Puff(s) Inhalation two times a day  clopidogrel Tablet 75 milliGRAM(s) Oral daily  dabigatran 150 milliGRAM(s) Oral every 12 hours  famotidine    Tablet 20 milliGRAM(s) Oral daily  furosemide    Tablet 40 milliGRAM(s) Oral daily  gabapentin 300 milliGRAM(s) Oral three times a day  lamoTRIgine 100 milliGRAM(s) Oral daily  metoprolol tartrate 100 milliGRAM(s) Oral two times a day  montelukast 10 milliGRAM(s) Oral daily  potassium chloride    Tablet ER 40 milliEquivalent(s) Oral every 4 hours  QUEtiapine 200 milliGRAM(s) Oral at bedtime  regadenoson Injectable 0.4 milliGRAM(s) IV Push once    MEDICATIONS  (PRN):  LORazepam     Tablet 0.5 milliGRAM(s) Oral two times a day PRN Anxiety      LAB/STUDIES:                        13.0   5.68  )-----------( 202      ( 01 Dec 2020 06:17 )             39.8     12-01    141  |  106  |  25<H>  ----------------------------<  91  3.3<L>   |  22  |  1.0    Ca    9.0      01 Dec 2020 06:17  Mg     1.9     12-01    TPro  6.0  /  Alb  3.8  /  TBili  0.4  /  DBili  x   /  AST  41  /  ALT  27  /  AlkPhos  208<H>  12-01      LIVER FUNCTIONS - ( 01 Dec 2020 06:17 )  Alb: 3.8 g/dL / Pro: 6.0 g/dL / ALK PHOS: 208 U/L / ALT: 27 U/L / AST: 41 U/L / GGT: x             CARDIAC MARKERS ( 29 Nov 2020 21:30 )  x     / <0.01 ng/mL / x     / x     / x      CARDIAC MARKERS ( 29 Nov 2020 13:15 )  x     / <0.01 ng/mL / x     / x     / x                  IMAGING:  ECHO, STRESS TEST, AND US OF NECK PENDING

## 2020-12-01 NOTE — CONSULT NOTE ADULT - ASSESSMENT
Impression:   HO Severe COPD on 4 liters NC  PAF on Xarelto  HO FLAVIO noncompliant with NIV  HO LICA stenosis scheduled for TCAR- Patient moderate pulmonary risk      Plan:  Wean O2 as tolerated, Goal Pox >92%  Triple inhaler therapy  NEBS q4h PRN  NIV AVAPS (, EPAP 10, IPAP min 15, IPAP max 19, FiO2 45%, RR 18)  Avoid oversedation  DVT ppx       Impression:   HO COPD on home O2   PAF on Dabigatran   HO FLAVIO noncompliant with NIV  New Left sided infiltrate       Plan:  Wean O2 as tolerated, Goal Pox >92%  Triple inhaler therapy  NEBS q4h PRN  CT chest NC   Avoid oversedation  DVT ppx on X  Further recommendation swill depend on her results and progress    Will follow up with coleen Reeves as OP

## 2020-12-01 NOTE — CONSULT NOTE ADULT - SUBJECTIVE AND OBJECTIVE BOX
Patient is a 75y old  Female who presents with a chief complaint of Odynophagia associated with chest pain (01 Dec 2020 12:58)      HPI:  Chief Complaint: Chest pain after swallowing       Past Medical History: COPD on 5L home O2, paroxysmal afib, carotid stenosis, HFpEF, HTN, HLD, Bipolar disorder      Past Surgical History: None relevant       History of present illness goes back to 6AM on the morning of presentation when the patient first noticed chest pain that was associated with swallowing. She states that she was in her usual state of health the night prior. Of note, she lives with her daughter who is currently in their home quarantining, but the patient states her daughter is not doing a good job and was concerned that she might have COVID, so she went to urgent care and got tested, results pending. Her chest pain was described as severe when she swallowed, radiating to her neck and shoulder so she called EMS and was brought to the ED.        In the ED, her vitals were normal. EMS reported that in transit there was some anterior leads that showed TWI, but repeat EKG in the ED did not show any changes. CXR did however show a LLL opacity, increased from last month's CXR representing a possible developing pneumonia. She also wanted to come to the ED because she is planning to undergo a carotid endarterectomy on 12/15 and was concerned that uf she was ill, she would need to postpone the procedure. Rapid COVID was negative, PCR pending. Admitted by ED for ACS rule-out.        ENT called to evaluate Pt. for findings of B/L palpable cervical LAD.  Pt. seen and examined at bedside states she developed dysphagia and odynophagia to liquids and solids at time of admission 11/29/20.  Able to swallow, and tolerate oral secretions. Had hx of left neck nodules x 1 month US was done - WNL f/u by PCP.     Pt. states today she is able eat soft food and drink fluids. Satets to baseline SOB 2/2 COPD Pt. denies recent fever, chills, voice changes, drooling.     PAST MEDICAL & SURGICAL HISTORY:  Falls    Congestive heart failure    Transient ischemic attack    FLAVIO on CPAP    Bipolar 1 disorder    Allergic reaction    PVD (peripheral vascular disease)    Other emphysema    Other cardiomyopathy    TIA (transient ischemic attack)    COPD (chronic obstructive pulmonary disease)    Depression    Hypertension    History of cholecystectomy      REVIEW OF SYSTEMS:  CONSTITUTIONAL: no  fevers or chills  HEENT: No visual changes  ENDO: No sweating  NECK: No pain or stiffness  MUSCULOSKELETAL: No back pain, no joint pain  RESPIRATORY: No shortness of breath  CARDIOVASCULAR: No chest pain  GASTROINTESTINAL: No abdominal or epigastric pain. No nausea, vomiting,  No diarrhea or constipation.   NEUROLOGICAL: No mental status changes  PSYCH: No depression, no mood changes  SKIN: No itching  ENT: as per HPI    MEDICATIONS  (STANDING):  amLODIPine   Tablet 2.5 milliGRAM(s) Oral daily  amoxicillin  875 milliGRAM(s)/clavulanate 1 Tablet(s) Oral two times a day  aspirin  chewable 81 milliGRAM(s) Oral daily  atorvastatin 20 milliGRAM(s) Oral at bedtime  benzocaine 20% Spray 1 Spray(s) Topical every 6 hours  budesonide 160 MICROgram(s)/formoterol 4.5 MICROgram(s) Inhaler 2 Puff(s) Inhalation two times a day  clopidogrel Tablet 75 milliGRAM(s) Oral daily  dabigatran 150 milliGRAM(s) Oral every 12 hours  famotidine    Tablet 20 milliGRAM(s) Oral daily  furosemide    Tablet 40 milliGRAM(s) Oral daily  gabapentin 300 milliGRAM(s) Oral three times a day  lamoTRIgine 100 milliGRAM(s) Oral daily  metoprolol tartrate 100 milliGRAM(s) Oral two times a day  montelukast 10 milliGRAM(s) Oral daily  potassium chloride    Tablet ER 40 milliEquivalent(s) Oral every 4 hours  QUEtiapine 200 milliGRAM(s) Oral at bedtime  regadenoson Injectable 0.4 milliGRAM(s) IV Push once    MEDICATIONS  (PRN):  LORazepam     Tablet 0.5 milliGRAM(s) Oral two times a day PRN Anxiety      Allergies:  codeine (Other (Moderate))  Depakote (Unknown)  IV Contrast (Anaphylaxis)  losartan (Angioedema)  Risperdal (Other)  verapamil (Short breath; Angioedema)       SOCIAL HISTORY: Hx of marihuana use hx of smoking 2 packs/day x about 40 years.    FAMILY HISTORY:  No pertinent family history in first degree relatives      Vital Signs Last 24 Hrs  T(C): 35.6 (01 Dec 2020 12:40), Max: 36.5 (01 Dec 2020 05:18)  T(F): 96.1 (01 Dec 2020 12:40), Max: 97.7 (01 Dec 2020 05:18)  HR: 75 (01 Dec 2020 12:40) (69 - 85)  BP: 117/56 (01 Dec 2020 12:40) (117/56 - 152/67)  RR: 18 (01 Dec 2020 12:40) (18 - 18)  SpO2: 96% (30 Nov 2020 20:05) (96% - 96%)       PHYSICAL EXAM:  Constitutional: NAD, well-developed, well nourished   HEENT: NC/AT, EOMI  Nose: B/L nare patent, no d/c   Mouth: oral mucosa dry, uvula and tongue midline no evidence of obstruction or ttp, no FOM ttp  Neck: + left cervical LAD   Back: No CVA tenderness  Respiratory: No accessory respiratory muscle use  Abd: Soft, NT/ND    Extremities: no edema  Neurological: A/O x 3  Psychiatric: Normal mood, normal affect  Skin: No rashes    I&O's Summary    30 Nov 2020 07:01  -  01 Dec 2020 07:00  --------------------------------------------------------  IN: 940 mL / OUT: 200 mL / NET: 740 mL        LABS:                        13.0   5.68  )-----------( 202      ( 01 Dec 2020 06:17 )             39.8     12-01    141  |  106  |  25<H>  ----------------------------<  91  3.3<L>   |  22  |  1.0    Ca    9.0      01 Dec 2020 06:17  Mg     1.9     12-01    TPro  6.0  /  Alb  3.8  /  TBili  0.4  /  DBili  x   /  AST  41  /  ALT  27  /  AlkPhos  208<H>  12-01        Patient is a 75y old  Female who presents with a chief complaint of Odynophagia associated with chest pain (01 Dec 2020 12:58)      HPI:  Chief Complaint: Chest pain after swallowing       Past Medical History: COPD on 5L home O2, paroxysmal afib, carotid stenosis, HFpEF, HTN, HLD, Bipolar disorder      Past Surgical History: None relevant       History of present illness goes back to 6AM on the morning of presentation when the patient first noticed chest pain that was associated with swallowing. She states that she was in her usual state of health the night prior. Of note, she lives with her daughter who is currently in their home quarantining, but the patient states her daughter is not doing a good job and was concerned that she might have COVID, so she went to urgent care and got tested, results pending. Her chest pain was described as severe when she swallowed, radiating to her neck and shoulder so she called EMS and was brought to the ED.        In the ED, her vitals were normal. EMS reported that in transit there was some anterior leads that showed TWI, but repeat EKG in the ED did not show any changes. CXR did however show a LLL opacity, increased from last month's CXR representing a possible developing pneumonia. She also wanted to come to the ED because she is planning to undergo a carotid endarterectomy on 12/15 and was concerned that uf she was ill, she would need to postpone the procedure. Rapid COVID was negative, PCR pending. Admitted by ED for ACS rule-out.        ENT called to evaluate Pt. for findings of B/L palpable cervical LAD.  Pt. seen and examined at bedside states she developed dysphagia and odynophagia to liquids and solids at time of admission 11/29/20.  Able to swallow, and tolerate oral secretions. Had hx of left neck nodules x 1 month US was done - WNL f/u by PCP.     Pt. states today she is able eat soft food and drink fluids. Satets to baseline SOB 2/2 COPD Pt. denies recent fever, chills, voice changes, drooling.     PAST MEDICAL & SURGICAL HISTORY:  Falls    Congestive heart failure    Transient ischemic attack    FLAVIO on CPAP    Bipolar 1 disorder    Allergic reaction    PVD (peripheral vascular disease)    Other emphysema    Other cardiomyopathy    TIA (transient ischemic attack)    COPD (chronic obstructive pulmonary disease)    Depression    Hypertension    History of cholecystectomy      REVIEW OF SYSTEMS:  CONSTITUTIONAL: no  fevers or chills  HEENT: No visual changes  ENDO: No sweating  NECK: No pain or stiffness  MUSCULOSKELETAL: No back pain, no joint pain  RESPIRATORY: No shortness of breath  CARDIOVASCULAR: No chest pain  GASTROINTESTINAL: No abdominal or epigastric pain. No nausea, vomiting,  No diarrhea or constipation.   NEUROLOGICAL: No mental status changes  PSYCH: No depression, no mood changes  SKIN: No itching  ENT: as per HPI    MEDICATIONS  (STANDING):  amLODIPine   Tablet 2.5 milliGRAM(s) Oral daily  amoxicillin  875 milliGRAM(s)/clavulanate 1 Tablet(s) Oral two times a day  aspirin  chewable 81 milliGRAM(s) Oral daily  atorvastatin 20 milliGRAM(s) Oral at bedtime  benzocaine 20% Spray 1 Spray(s) Topical every 6 hours  budesonide 160 MICROgram(s)/formoterol 4.5 MICROgram(s) Inhaler 2 Puff(s) Inhalation two times a day  clopidogrel Tablet 75 milliGRAM(s) Oral daily  dabigatran 150 milliGRAM(s) Oral every 12 hours  famotidine    Tablet 20 milliGRAM(s) Oral daily  furosemide    Tablet 40 milliGRAM(s) Oral daily  gabapentin 300 milliGRAM(s) Oral three times a day  lamoTRIgine 100 milliGRAM(s) Oral daily  metoprolol tartrate 100 milliGRAM(s) Oral two times a day  montelukast 10 milliGRAM(s) Oral daily  potassium chloride    Tablet ER 40 milliEquivalent(s) Oral every 4 hours  QUEtiapine 200 milliGRAM(s) Oral at bedtime  regadenoson Injectable 0.4 milliGRAM(s) IV Push once    MEDICATIONS  (PRN):  LORazepam     Tablet 0.5 milliGRAM(s) Oral two times a day PRN Anxiety      Allergies:  codeine (Other (Moderate))  Depakote (Unknown)  IV Contrast (Anaphylaxis)  losartan (Angioedema)  Risperdal (Other)  verapamil (Short breath; Angioedema)       SOCIAL HISTORY: Hx of marihuana use hx of smoking 2 packs/day x about 40 years.    FAMILY HISTORY:  No pertinent family history in first degree relatives      Vital Signs Last 24 Hrs  T(C): 35.6 (01 Dec 2020 12:40), Max: 36.5 (01 Dec 2020 05:18)  T(F): 96.1 (01 Dec 2020 12:40), Max: 97.7 (01 Dec 2020 05:18)  HR: 75 (01 Dec 2020 12:40) (69 - 85)  BP: 117/56 (01 Dec 2020 12:40) (117/56 - 152/67)  RR: 18 (01 Dec 2020 12:40) (18 - 18)  SpO2: 96% (30 Nov 2020 20:05) (96% - 96%)       PHYSICAL EXAM:  Constitutional: NAD, well-developed, well nourished   HEENT: NC/AT, EOMI  Nose: B/L nare patent, no d/c   Mouth: oral mucosa dry, uvula and tongue midline no evidence of obstruction or ttp, no FOM ttp  Neck: + left cervical LAD   Back: No CVA tenderness  Respiratory: No accessory respiratory muscle use  Abd: Soft, NT/ND    Extremities: no edema  Neurological: A/O x 3  Psychiatric: Normal mood, normal affect  Skin: No rashes      FFL by Dr. Vaughn: No evidence of obstruction, exam normal .     I&O's Summary    30 Nov 2020 07:01  -  01 Dec 2020 07:00  --------------------------------------------------------  IN: 940 mL / OUT: 200 mL / NET: 740 mL        LABS:                        13.0   5.68  )-----------( 202      ( 01 Dec 2020 06:17 )             39.8     12-01    141  |  106  |  25<H>  ----------------------------<  91  3.3<L>   |  22  |  1.0    Ca    9.0      01 Dec 2020 06:17  Mg     1.9     12-01    TPro  6.0  /  Alb  3.8  /  TBili  0.4  /  DBili  x   /  AST  41  /  ALT  27  /  AlkPhos  208<H>  12-01

## 2020-12-01 NOTE — CONSULT NOTE ADULT - ASSESSMENT
ASSESSMENT:  75yF w/ PMHx of  LICA Stenosis and TIA, patient of Dr. Huerta, scheduled for TCAR 12/15, here now with chest pain. Consulted to possible expedite TCAR, and reevaluate her medications Plavix, Pradaxa, and ASA. Physical exam findings, imaging, and labs as documented above.     PLAN:  - Patient is cleared to continue her current medications including current doses of Pradaxa, Plavix, and ASA  - As of now, obtain preoperative clearances for patient, and she will go for TCAR as scheduled on 12/15/20 as an outpatient.    Above plan discussed with Dr. Huerta, Vascular Fellow, patient and primary team ASSESSMENT:  75yF w/ PMHx of  LICA Stenosis and TIA, patient of Dr. Huerta, scheduled for TCAR 12/15, here now with chest pain. Consulted to possible expedite TCAR, and reevaluate her medications Plavix, Pradaxa, and ASA. Physical exam findings, imaging, and labs as documented above.     PLAN:  - Patient is cleared to continue her current medications including current doses of Pradaxa, Plavix, and ASA.  - Stop taking the Pradaxa 24 hours prior to procedure on 12/15/20  - As of now, obtain preoperative clearances for patient, and she will go for TCAR as scheduled on 12/15/20 as an outpatient.    Above plan discussed with Dr. Huerta, Vascular Fellow, patient and primary team

## 2020-12-01 NOTE — PROGRESS NOTE ADULT - SUBJECTIVE AND OBJECTIVE BOX
no chest pain   no sob     ROS otherwise negative       PHYSICAL EXAM:  GENERAL: NAD,  Pulm: Clear to auscultation bilaterally; No wheeze  CV: Regular rate and rhythm; No murmurs, rubs, or gallops  GI: Soft, Nontender, Nondistended; Bowel sounds present  EXTREMITIES:  2+ Peripheral Pulses, No clubbing, cyanosis, or edema  PSYCH: AAOx3  NEUROLOGY: non-focal  SKIN: No rashes or lesions                            13.0   5.68  )-----------( 202      ( 01 Dec 2020 06:17 )             39.8     12-01    141  |  106  |  25<H>  ----------------------------<  91  3.3<L>   |  22  |  1.0    Ca    9.0      01 Dec 2020 06:17  Mg     1.9     12-01    TPro  6.0  /  Alb  3.8  /  TBili  0.4  /  DBili  x   /  AST  41  /  ALT  27  /  AlkPhos  208<H>  12-01    LIVER FUNCTIONS - ( 01 Dec 2020 06:17 )  Alb: 3.8 g/dL / Pro: 6.0 g/dL / ALK PHOS: 208 U/L / ALT: 27 U/L / AST: 41 U/L / GGT: x             MEDICATIONS  (STANDING):  amLODIPine   Tablet 2.5 milliGRAM(s) Oral daily  amoxicillin  875 milliGRAM(s)/clavulanate 1 Tablet(s) Oral two times a day  aspirin  chewable 81 milliGRAM(s) Oral daily  atorvastatin 20 milliGRAM(s) Oral at bedtime  benzocaine 20% Spray 1 Spray(s) Topical every 6 hours  budesonide 160 MICROgram(s)/formoterol 4.5 MICROgram(s) Inhaler 2 Puff(s) Inhalation two times a day  clopidogrel Tablet 75 milliGRAM(s) Oral daily  dabigatran 150 milliGRAM(s) Oral every 12 hours  famotidine    Tablet 20 milliGRAM(s) Oral daily  furosemide    Tablet 40 milliGRAM(s) Oral daily  gabapentin 300 milliGRAM(s) Oral three times a day  lamoTRIgine 100 milliGRAM(s) Oral daily  metoprolol tartrate 100 milliGRAM(s) Oral two times a day  montelukast 10 milliGRAM(s) Oral daily  potassium chloride    Tablet ER 40 milliEquivalent(s) Oral every 4 hours  QUEtiapine 200 milliGRAM(s) Oral at bedtime  regadenoson Injectable 0.4 milliGRAM(s) IV Push once    MEDICATIONS  (PRN):  LORazepam     Tablet 0.5 milliGRAM(s) Oral two times a day PRN Anxiety      CARDIAC MARKERS ( 29 Nov 2020 21:30 )  x     / <0.01 ng/mL / x     / x     / x      CARDIAC MARKERS ( 29 Nov 2020 13:15 )  x     / <0.01 ng/mL / x     / x     / x

## 2020-12-02 ENCOUNTER — TRANSCRIPTION ENCOUNTER (OUTPATIENT)
Age: 75
End: 2020-12-02

## 2020-12-02 LAB
ALBUMIN SERPL ELPH-MCNC: 3.6 G/DL — SIGNIFICANT CHANGE UP (ref 3.5–5.2)
ALP SERPL-CCNC: 217 U/L — HIGH (ref 30–115)
ALT FLD-CCNC: 29 U/L — SIGNIFICANT CHANGE UP (ref 0–41)
ANION GAP SERPL CALC-SCNC: 11 MMOL/L — SIGNIFICANT CHANGE UP (ref 7–14)
AST SERPL-CCNC: 46 U/L — HIGH (ref 0–41)
BASOPHILS # BLD AUTO: 0.03 K/UL — SIGNIFICANT CHANGE UP (ref 0–0.2)
BASOPHILS NFR BLD AUTO: 0.6 % — SIGNIFICANT CHANGE UP (ref 0–1)
BILIRUB SERPL-MCNC: 0.3 MG/DL — SIGNIFICANT CHANGE UP (ref 0.2–1.2)
BUN SERPL-MCNC: 34 MG/DL — HIGH (ref 10–20)
CALCIUM SERPL-MCNC: 9.3 MG/DL — SIGNIFICANT CHANGE UP (ref 8.5–10.1)
CHLORIDE SERPL-SCNC: 110 MMOL/L — SIGNIFICANT CHANGE UP (ref 98–110)
CO2 SERPL-SCNC: 21 MMOL/L — SIGNIFICANT CHANGE UP (ref 17–32)
CREAT SERPL-MCNC: 1 MG/DL — SIGNIFICANT CHANGE UP (ref 0.7–1.5)
EOSINOPHIL # BLD AUTO: 0.33 K/UL — SIGNIFICANT CHANGE UP (ref 0–0.7)
EOSINOPHIL NFR BLD AUTO: 6.8 % — SIGNIFICANT CHANGE UP (ref 0–8)
GLUCOSE SERPL-MCNC: 93 MG/DL — SIGNIFICANT CHANGE UP (ref 70–99)
HCT VFR BLD CALC: 39.6 % — SIGNIFICANT CHANGE UP (ref 37–47)
HGB BLD-MCNC: 12.6 G/DL — SIGNIFICANT CHANGE UP (ref 12–16)
IMM GRANULOCYTES NFR BLD AUTO: 0.2 % — SIGNIFICANT CHANGE UP (ref 0.1–0.3)
LYMPHOCYTES # BLD AUTO: 1.21 K/UL — SIGNIFICANT CHANGE UP (ref 1.2–3.4)
LYMPHOCYTES # BLD AUTO: 24.9 % — SIGNIFICANT CHANGE UP (ref 20.5–51.1)
MAGNESIUM SERPL-MCNC: 2 MG/DL — SIGNIFICANT CHANGE UP (ref 1.8–2.4)
MCHC RBC-ENTMCNC: 30.8 PG — SIGNIFICANT CHANGE UP (ref 27–31)
MCHC RBC-ENTMCNC: 31.8 G/DL — LOW (ref 32–37)
MCV RBC AUTO: 96.8 FL — SIGNIFICANT CHANGE UP (ref 81–99)
MONOCYTES # BLD AUTO: 0.59 K/UL — SIGNIFICANT CHANGE UP (ref 0.1–0.6)
MONOCYTES NFR BLD AUTO: 12.2 % — HIGH (ref 1.7–9.3)
NEUTROPHILS # BLD AUTO: 2.68 K/UL — SIGNIFICANT CHANGE UP (ref 1.4–6.5)
NEUTROPHILS NFR BLD AUTO: 55.3 % — SIGNIFICANT CHANGE UP (ref 42.2–75.2)
NRBC # BLD: 0 /100 WBCS — SIGNIFICANT CHANGE UP (ref 0–0)
PLATELET # BLD AUTO: 206 K/UL — SIGNIFICANT CHANGE UP (ref 130–400)
POTASSIUM SERPL-MCNC: 4 MMOL/L — SIGNIFICANT CHANGE UP (ref 3.5–5)
POTASSIUM SERPL-SCNC: 4 MMOL/L — SIGNIFICANT CHANGE UP (ref 3.5–5)
PROT SERPL-MCNC: 6.2 G/DL — SIGNIFICANT CHANGE UP (ref 6–8)
RBC # BLD: 4.09 M/UL — LOW (ref 4.2–5.4)
RBC # FLD: 13.7 % — SIGNIFICANT CHANGE UP (ref 11.5–14.5)
SODIUM SERPL-SCNC: 142 MMOL/L — SIGNIFICANT CHANGE UP (ref 135–146)
WBC # BLD: 4.85 K/UL — SIGNIFICANT CHANGE UP (ref 4.8–10.8)
WBC # FLD AUTO: 4.85 K/UL — SIGNIFICANT CHANGE UP (ref 4.8–10.8)

## 2020-12-02 PROCEDURE — 93306 TTE W/DOPPLER COMPLETE: CPT | Mod: 26

## 2020-12-02 PROCEDURE — 70540 MRI ORBIT/FACE/NECK W/O DYE: CPT | Mod: 26

## 2020-12-02 PROCEDURE — 99232 SBSQ HOSP IP/OBS MODERATE 35: CPT

## 2020-12-02 PROCEDURE — 71250 CT THORAX DX C-: CPT | Mod: 26

## 2020-12-02 RX ORDER — TIOTROPIUM BROMIDE 18 UG/1
1 CAPSULE ORAL; RESPIRATORY (INHALATION) DAILY
Refills: 0 | Status: DISCONTINUED | OUTPATIENT
Start: 2020-12-02 | End: 2020-12-04

## 2020-12-02 RX ORDER — FAMOTIDINE 10 MG/ML
20 INJECTION INTRAVENOUS
Refills: 0 | Status: DISCONTINUED | OUTPATIENT
Start: 2020-12-02 | End: 2020-12-02

## 2020-12-02 RX ORDER — TIOTROPIUM BROMIDE 18 UG/1
1 CAPSULE ORAL; RESPIRATORY (INHALATION)
Qty: 0 | Refills: 0 | DISCHARGE
Start: 2020-12-02

## 2020-12-02 RX ADMIN — Medication 0.5 MILLIGRAM(S): at 21:54

## 2020-12-02 RX ADMIN — FAMOTIDINE 20 MILLIGRAM(S): 10 INJECTION INTRAVENOUS at 11:36

## 2020-12-02 RX ADMIN — BUDESONIDE AND FORMOTEROL FUMARATE DIHYDRATE 2 PUFF(S): 160; 4.5 AEROSOL RESPIRATORY (INHALATION) at 21:35

## 2020-12-02 RX ADMIN — GABAPENTIN 300 MILLIGRAM(S): 400 CAPSULE ORAL at 05:48

## 2020-12-02 RX ADMIN — ATORVASTATIN CALCIUM 20 MILLIGRAM(S): 80 TABLET, FILM COATED ORAL at 21:49

## 2020-12-02 RX ADMIN — Medication 0.5 MILLIGRAM(S): at 13:07

## 2020-12-02 RX ADMIN — LAMOTRIGINE 100 MILLIGRAM(S): 25 TABLET, ORALLY DISINTEGRATING ORAL at 11:37

## 2020-12-02 RX ADMIN — Medication 30 MILLILITER(S): at 21:48

## 2020-12-02 RX ADMIN — Medication 1 SPRAY(S): at 05:49

## 2020-12-02 RX ADMIN — CLOPIDOGREL BISULFATE 75 MILLIGRAM(S): 75 TABLET, FILM COATED ORAL at 11:37

## 2020-12-02 RX ADMIN — DABIGATRAN ETEXILATE MESYLATE 150 MILLIGRAM(S): 150 CAPSULE ORAL at 17:33

## 2020-12-02 RX ADMIN — Medication 40 MILLIGRAM(S): at 05:48

## 2020-12-02 RX ADMIN — Medication 1 SPRAY(S): at 11:38

## 2020-12-02 RX ADMIN — MONTELUKAST 10 MILLIGRAM(S): 4 TABLET, CHEWABLE ORAL at 11:37

## 2020-12-02 RX ADMIN — DABIGATRAN ETEXILATE MESYLATE 150 MILLIGRAM(S): 150 CAPSULE ORAL at 05:48

## 2020-12-02 RX ADMIN — Medication 1 SPRAY(S): at 23:39

## 2020-12-02 RX ADMIN — Medication 81 MILLIGRAM(S): at 11:37

## 2020-12-02 RX ADMIN — QUETIAPINE FUMARATE 200 MILLIGRAM(S): 200 TABLET, FILM COATED ORAL at 21:49

## 2020-12-02 RX ADMIN — GABAPENTIN 300 MILLIGRAM(S): 400 CAPSULE ORAL at 21:49

## 2020-12-02 RX ADMIN — AMLODIPINE BESYLATE 2.5 MILLIGRAM(S): 2.5 TABLET ORAL at 05:48

## 2020-12-02 RX ADMIN — Medication 1 TABLET(S): at 05:48

## 2020-12-02 RX ADMIN — Medication 100 MILLIGRAM(S): at 17:32

## 2020-12-02 RX ADMIN — Medication 100 MILLIGRAM(S): at 05:48

## 2020-12-02 RX ADMIN — BUDESONIDE AND FORMOTEROL FUMARATE DIHYDRATE 2 PUFF(S): 160; 4.5 AEROSOL RESPIRATORY (INHALATION) at 08:25

## 2020-12-02 RX ADMIN — GABAPENTIN 300 MILLIGRAM(S): 400 CAPSULE ORAL at 13:01

## 2020-12-02 RX ADMIN — Medication 1 SPRAY(S): at 17:32

## 2020-12-02 NOTE — PROGRESS NOTE ADULT - SUBJECTIVE AND OBJECTIVE BOX
no chest pain   no sob   doing well today     ROS otherwise neg    Vital Signs Last 24 Hrs  T(C): 36.5 (02 Dec 2020 05:29), Max: 36.5 (02 Dec 2020 05:29)  T(F): 97.7 (02 Dec 2020 05:29), Max: 97.7 (02 Dec 2020 05:29)  HR: 89 (02 Dec 2020 05:29) (73 - 89)  BP: 130/69 (02 Dec 2020 05:29) (91/50 - 130/69)  BP(mean): --  RR: 18 (02 Dec 2020 05:29) (18 - 18)  SpO2: 95% (02 Dec 2020 10:55) (94% - 95%)    PHYSICAL EXAM:  GENERAL: NAD,   Pulm: Clear to auscultation bilaterally; No wheeze  CV: Regular rate and rhythm;   GI: Soft, Nontender, Nondistended; Bowel sounds present  EXTREMITIES:  2+ Peripheral Pulses, No clubbing, cyanosis, or edema  PSYCH: AAOx3  NEUROLOGY: non-focal  SKIN: No rashes or lesions                          12.6   4.85  )-----------( 206      ( 02 Dec 2020 06:30 )             39.6     12-02    142  |  110  |  34<H>  ----------------------------<  93  4.0   |  21  |  1.0    Ca    9.3      02 Dec 2020 06:30  Mg     2.0     12-02    TPro  6.2  /  Alb  3.6  /  TBili  0.3  /  DBili  x   /  AST  46<H>  /  ALT  29  /  AlkPhos  217<H>  12-02    LIVER FUNCTIONS - ( 02 Dec 2020 06:30 )  Alb: 3.6 g/dL / Pro: 6.2 g/dL / ALK PHOS: 217 U/L / ALT: 29 U/L / AST: 46 U/L / GGT: x             MEDICATIONS  (STANDING):  amLODIPine   Tablet 2.5 milliGRAM(s) Oral daily  aspirin  chewable 81 milliGRAM(s) Oral daily  atorvastatin 20 milliGRAM(s) Oral at bedtime  benzocaine 20% Spray 1 Spray(s) Topical every 6 hours  budesonide 160 MICROgram(s)/formoterol 4.5 MICROgram(s) Inhaler 2 Puff(s) Inhalation two times a day  clopidogrel Tablet 75 milliGRAM(s) Oral daily  dabigatran 150 milliGRAM(s) Oral every 12 hours  famotidine    Tablet 20 milliGRAM(s) Oral daily  furosemide    Tablet 40 milliGRAM(s) Oral daily  gabapentin 300 milliGRAM(s) Oral three times a day  lamoTRIgine 100 milliGRAM(s) Oral daily  metoprolol tartrate 100 milliGRAM(s) Oral two times a day  montelukast 10 milliGRAM(s) Oral daily  QUEtiapine 200 milliGRAM(s) Oral at bedtime  tiotropium 18 MICROgram(s) Capsule 1 Capsule(s) Inhalation daily    MEDICATIONS  (PRN):  LORazepam     Tablet 0.5 milliGRAM(s) Oral two times a day PRN Anxiety

## 2020-12-02 NOTE — CHART NOTE - NSCHARTNOTEFT_GEN_A_CORE
ASSESSMENT:  75yF w/ PMHx of  LICA Stenosis and TIA, patient of Dr. Huerta, scheduled for TCAR 12/15, here now with chest pain. Consulted to possible expedite TCAR, and reevaluate her medications Plavix, Pradaxa, and ASA. Physical exam findings, imaging, and labs as documented above.     PLAN:  - Patient is cleared to continue her current medications including current doses of Pradaxa, Plavix, and ASA.  - will reconsider her anticoagulant in one month as the patient will follow up with Dr Huerta   - As of now, obtain preoperative clearances for patient, and she will go for TCAR as scheduled on 12/15/20 as an outpatient.

## 2020-12-02 NOTE — PROGRESS NOTE ADULT - ASSESSMENT
Ms. Henderson is a 75F with a PMH of COPD on home oxygen, pAfib, HFpEF, HTN, HLD, bipolar disorder presented to the ED for chest pain associated with swallowing, found to have a LLL opacity on CXR and recent COVID exposure admitted to rule out ACS.     #Neck pain   r/o dental infection vs swollen lymph node  - swollen lump at the left side of the neck- Left kate-parotid palpable mass, right sided submandibular lymph node appreciated  - c/c augmentin ( patient is not improving on abx)  - will hold CT neck with IV contrast( pt had anaphylaxis with contrast previously)  - Dental eval appreciated (F/U with outpatient dentist for comprehensive dental care)  - ENT consult appreciated: SLP eval   - f/u MRI neck    # Chest pain, most likely pleuritic (Dr. Hoffmann Cardiologist)  - Associated with odynophagia, unlikely ACS. EMS/Ed team said they saw TWI, but repeat EKG baseline.   - Trops - x2 and BNP negative. Not volume overloaded, no CHF exacerbation.   - CXR:  LLL opacity.   - CXR on 11/30: Worsening left pleural effusion/opacity.  - Echo done in October at [facility], but records not in system  - f/u ECHO   - stress test 12/1/2020: No ischemic EKG changes    # Paroxysmal atrial fibrillation  - On Pradaxa 150mg PO twice daily and ASA 81 mg   - Plavix recently added after a recent TIA last month.   - Rate controlled currently  - pt has carotid disease(evaluated by vascular Dr Huerta for transcarotid artery revascularization in december)- was started on plavix and patient is already on asa and pradaxa.  - vascular consult appreciated: cleared to continue Prodaxa, Plavix, ASA. Will need to stop Prodaxa 24 hours prior to TCAR on 12/15/20.  - f/u Cardio consult( Dr. Rao) on the need of dual antiplatelets given that patient is also on Pradaxa for afib history; is it safe to continue Prodaxa, or Eliquis would be better option.     # COPD  - No increase in basal need of O2, no increased cough, fever, chills, or dyspnea so exacerbation is unlikely.   - Continue home inhalers.  - CXR( on admission):  LLL opacity.   - CXR on 11/30: Worsening left pleural effusion/opacity.  - Pulmonology consult appreciated: CT chest non contrast    # chronic HFpEF  # HTN  # HLD  - Preserved EF according to Dr. Bishop's notes, although no echo report in [place].   - Continue PO diuretics and home BP and cholesterol meds    # Bipoar Disorder  - Mood stable  - Continue home meds Lamictal 100mg PO daily, Seroquel 200mg at bedtime, and Ativan 0.5mg PO twice daily PRN    DVT ppx: Lovenox 40 mg SubQ daily   GI ppx: Pepcid 10mg PO daily    Diet: DASH/TLC   Activity: IAT   Dispo: From home   Code: FULL Ms. Henderson is a 75F with a PMH of COPD on home oxygen, pAfib, HFpEF, HTN, HLD, bipolar disorder presented to the ED for chest pain associated with swallowing, found to have a LLL opacity on CXR and recent COVID exposure admitted to rule out ACS.     #Neck pain   r/o dental infection vs swollen lymph node  - swollen lump at the left side of the neck- Left kate-parotid palpable mass, right sided submandibular lymph node appreciated  - c/c augmentin ( patient is not improving on abx)  - will hold CT neck with IV contrast( pt had anaphylaxis with contrast previously)  - Dental eval appreciated (F/U with outpatient dentist for comprehensive dental care)  - ENT consult appreciated: SLP eval   - f/u MRI neck    # Chest pain, most likely pleuritic (Dr. Hoffmann Cardiologist)  - Associated with odynophagia, unlikely ACS. EMS/Ed team said they saw TWI, but repeat EKG baseline.   - Trops - x2 and BNP negative. Not volume overloaded, no CHF exacerbation.   - CXR:  LLL opacity.   - CXR on 11/30: Worsening left pleural effusion/opacity.  - Echo done in October at [facility], but records not in system  - ECHO (12/2/2020): Hyperdynamic global left ventricular systolic function; EF of 70 %; Moderately increased LV wall thickness. Grade I diastolic dysfunction.  - stress test 12/1/2020: No ischemic EKG changes    # Paroxysmal atrial fibrillation  - On Pradaxa 150mg PO twice daily and ASA 81 mg   - Plavix recently added after a recent TIA last month.   - Rate controlled currently  - pt has carotid disease(evaluated by vascular Dr Huerta for transcarotid artery revascularization in december)- was started on plavix and patient is already on asa and pradaxa.  - vascular consult appreciated: cleared to continue Prodaxa, Plavix, ASA. Will need to stop Prodaxa 24 hours prior to TCAR on 12/15/20.  - f/u Cardio consult( Dr. Rao) on the need of dual antiplatelets given that patient is also on Pradaxa for afib history; is it safe to continue Prodaxa, or Eliquis would be better option.     # COPD  - No increase in basal need of O2, no increased cough, fever, chills, or dyspnea so exacerbation is unlikely.   - Continue home inhalers.  - triple inhaler therapy  - CXR( on admission):  LLL opacity.   - CXR on 11/30: Worsening left pleural effusion/opacity.  - Pulmonology consult appreciated: CT chest non contrast    # chronic HFpEF  # HTN  # HLD  - Preserved EF according to Dr. Bishop's notes, although no echo report in [place].   - ECHO (12/2/2020): Hyperdynamic global left ventricular systolic function; EF of 70 %; Moderately increased LV wall thickness. Grade I diastolic dysfunction.  - Continue PO diuretics and home BP and cholesterol meds    # Bipoar Disorder  - Mood stable  - Continue home meds Lamictal 100mg PO daily, Seroquel 200mg at bedtime, and Ativan 0.5mg PO twice daily PRN    DVT ppx: Lovenox 40 mg SubQ daily   GI ppx: Pepcid 10mg PO daily    Diet: DASH/TLC   Activity: IAT   Dispo: From home   Code: FULL

## 2020-12-02 NOTE — PROGRESS NOTE ADULT - ASSESSMENT
Chest pain atypical ruled out for MI   nuc stress test no ischemia       carotid artery disease: spoke to Dr Cleveland from vascular and he will continue with dual antiplatelets for now and then after getting a carotid stent will d/c one of the antiplatelets since patient is also on pradaxa for afib. patient will follow up as OPT for carotid intervention with vascular     patient was at [facility] in october and had TIA symptoms and spoke to Dr jaime patients cardiologist and said that she had afib when found by EMS and was cardioverted in the filed and has been in NSR since then and recommended to keep on pradxa  150 mg BID. Discussed benefits of continue  anticoagulation to prevent strokes from Afib and risks involved with  anticoagulantion including risk of bleeding, hemorrhagic stroke, GI bleed and even death. Pt agreed to stay on  anticoagulation given benifits outweighs risk.    Afib chronic now in NSR: on pradaxa 150 BID.        left periparotid mass per ENT eval get MRI without contrast of neck. patient was supposed to get CT scan with IV contrast but gets anaphylaxis will contrast so ENT recommended MRI     New left lower lobe opacity on chest xray seen by pulm and recommended CT chest. no evidence of pna clinically       COPD stable ha shome o2       discharge pending MRI neck and CT chest

## 2020-12-02 NOTE — PROGRESS NOTE ADULT - SUBJECTIVE AND OBJECTIVE BOX
SUBJECTIVE:    Patient is a 75y old Female who presents with a chief complaint of Odynophagia associated with chest pain (01 Dec 2020 13:38)    Currently admitted to medicine with the primary diagnosis of Chest pain.  Today is hospital day 3d. This morning she is resting in chair and reports that neck pain and swelling is not resolving.   Pt has been evaluated by ENT- will go for MRI of neck today.    PAST MEDICAL & SURGICAL HISTORY  Falls    Congestive heart failure    Transient ischemic attack    FLAVIO on CPAP    Bipolar 1 disorder    Allergic reaction    PVD (peripheral vascular disease)    Other emphysema    Other cardiomyopathy    TIA (transient ischemic attack)    COPD (chronic obstructive pulmonary disease)    Depression    Hypertension    History of cholecystectomy      ALLERGIES:  codeine (Other (Moderate))  Depakote (Unknown)  IV Contrast (Anaphylaxis)  losartan (Angioedema)  Risperdal (Other)  verapamil (Short breath; Angioedema)    MEDICATIONS:  STANDING MEDICATIONS  amLODIPine   Tablet 2.5 milliGRAM(s) Oral daily  aspirin  chewable 81 milliGRAM(s) Oral daily  atorvastatin 20 milliGRAM(s) Oral at bedtime  benzocaine 20% Spray 1 Spray(s) Topical every 6 hours  budesonide 160 MICROgram(s)/formoterol 4.5 MICROgram(s) Inhaler 2 Puff(s) Inhalation two times a day  clopidogrel Tablet 75 milliGRAM(s) Oral daily  dabigatran 150 milliGRAM(s) Oral every 12 hours  famotidine    Tablet 20 milliGRAM(s) Oral daily  furosemide    Tablet 40 milliGRAM(s) Oral daily  gabapentin 300 milliGRAM(s) Oral three times a day  lamoTRIgine 100 milliGRAM(s) Oral daily  metoprolol tartrate 100 milliGRAM(s) Oral two times a day  montelukast 10 milliGRAM(s) Oral daily  QUEtiapine 200 milliGRAM(s) Oral at bedtime    PRN MEDICATIONS  LORazepam     Tablet 0.5 milliGRAM(s) Oral two times a day PRN    VITALS:   T(F): 97.7  HR: 89  BP: 130/69  RR: 18  SpO2: 94%    LABS:                        12.6   4.85  )-----------( 206      ( 02 Dec 2020 06:30 )             39.6     12-02    142  |  110  |  34<H>  ----------------------------<  93  4.0   |  21  |  1.0    Ca    9.3      02 Dec 2020 06:30  Mg     2.0     12-02    TPro  6.2  /  Alb  3.6  /  TBili  0.3  /  DBili  x   /  AST  46<H>  /  ALT  29  /  AlkPhos  217<H>  12-02          RADIOLOGY:   from: CT Brain Stroke Protocol (10.14.20 @ 16:50) >  IMPRESSION:  No acute intracranial pathology. No evidence of midline shift, mass effect or intracranial hemorrhage.    Mild chronic microvascular-type changes.      < end of copied text >    < from: Xray Chest 1 View- PORTABLE-Routine (Xray Chest 1 View- PORTABLE-Routine in AM.) (11.30.20 @ 06:32) >  IMPRESSION:    Worsening left pleural effusion/opacity.    < end of copied text >      < from: NM Nuclear Stress Pharmacologic Multiple (12.01.20 @ 10:59) >    Impression:  1. NORMAL LEXISCAN / REST MYOCARDIAL PERFUSION TOMOGRAPHY, WITH NO EVIDENCE FOR ISCHEMIA DURING LEXISCAN INFUSION.  2. NORMAL RESTING LEFT VENTRICULAR WALL MOTION AND WALL THICKENING.  3. LEFT VENTRICULAR EJECTION FRACTION OF  79 % WHICH IS WITHIN RANGE OF NORMAL.    < end of copied text >  PHYSICAL EXAM:  GEN: No acute distress,   Neck: swollen left neck lump is palpated, tender to touch. Right submandibular lymph node appreciated.   LUNGS: mild rhonchi at left lung base  HEART: Regular, +S1 S2  ABD: Soft, non-tender, non-distended.   EXT: no edema on LE b/l, 2+dorsalis pedes pulse, tender to palpation on LE/BRADY/Skin Intact.   NEURO: AAOX3           SUBJECTIVE:    Patient is a 75y old Female who presents with a chief complaint of Odynophagia associated with chest pain (01 Dec 2020 13:38)    Currently admitted to medicine with the primary diagnosis of Chest pain.  Today is hospital day 3d. This morning she is resting in chair and reports that neck pain and swelling is not resolving.   Pt has been evaluated by ENT- will go for MRI of neck today.    PAST MEDICAL & SURGICAL HISTORY  Falls    Congestive heart failure    Transient ischemic attack    FLAVIO on CPAP    Bipolar 1 disorder    Allergic reaction    PVD (peripheral vascular disease)    Other emphysema    Other cardiomyopathy    TIA (transient ischemic attack)    COPD (chronic obstructive pulmonary disease)    Depression    Hypertension    History of cholecystectomy      ALLERGIES:  codeine (Other (Moderate))  Depakote (Unknown)  IV Contrast (Anaphylaxis)  losartan (Angioedema)  Risperdal (Other)  verapamil (Short breath; Angioedema)    MEDICATIONS:  STANDING MEDICATIONS  amLODIPine   Tablet 2.5 milliGRAM(s) Oral daily  aspirin  chewable 81 milliGRAM(s) Oral daily  atorvastatin 20 milliGRAM(s) Oral at bedtime  benzocaine 20% Spray 1 Spray(s) Topical every 6 hours  budesonide 160 MICROgram(s)/formoterol 4.5 MICROgram(s) Inhaler 2 Puff(s) Inhalation two times a day  clopidogrel Tablet 75 milliGRAM(s) Oral daily  dabigatran 150 milliGRAM(s) Oral every 12 hours  famotidine    Tablet 20 milliGRAM(s) Oral daily  furosemide    Tablet 40 milliGRAM(s) Oral daily  gabapentin 300 milliGRAM(s) Oral three times a day  lamoTRIgine 100 milliGRAM(s) Oral daily  metoprolol tartrate 100 milliGRAM(s) Oral two times a day  montelukast 10 milliGRAM(s) Oral daily  QUEtiapine 200 milliGRAM(s) Oral at bedtime    PRN MEDICATIONS  LORazepam     Tablet 0.5 milliGRAM(s) Oral two times a day PRN    VITALS:   T(F): 97.7  HR: 89  BP: 130/69  RR: 18  SpO2: 94%    LABS:                        12.6   4.85  )-----------( 206      ( 02 Dec 2020 06:30 )             39.6     12-02    142  |  110  |  34<H>  ----------------------------<  93  4.0   |  21  |  1.0    Ca    9.3      02 Dec 2020 06:30  Mg     2.0     12-02    TPro  6.2  /  Alb  3.6  /  TBili  0.3  /  DBili  x   /  AST  46<H>  /  ALT  29  /  AlkPhos  217<H>  12-02          RADIOLOGY:   from: CT Brain Stroke Protocol (10.14.20 @ 16:50) >  IMPRESSION:  No acute intracranial pathology. No evidence of midline shift, mass effect or intracranial hemorrhage.    Mild chronic microvascular-type changes.      < end of copied text >    < from: Xray Chest 1 View- PORTABLE-Routine (Xray Chest 1 View- PORTABLE-Routine in AM.) (11.30.20 @ 06:32) >  IMPRESSION:    Worsening left pleural effusion/opacity.    < end of copied text >      < from: NM Nuclear Stress Pharmacologic Multiple (12.01.20 @ 10:59) >    Impression:  1. NORMAL LEXISCAN / REST MYOCARDIAL PERFUSION TOMOGRAPHY, WITH NO EVIDENCE FOR ISCHEMIA DURING LEXISCAN INFUSION.  2. NORMAL RESTING LEFT VENTRICULAR WALL MOTION AND WALL THICKENING.  3. LEFT VENTRICULAR EJECTION FRACTION OF  79 % WHICH IS WITHIN RANGE OF NORMAL.    < end of copied text >  < from: TTE Echo Complete w/o Contrast w/ Doppler (12.02.20 @ 08:52) >    Summary:   1. Hyperdynamic global left ventricular systolic function.   2. LV Ejection Fraction by Scott's Method with a biplane EF of 70 %.   3. Moderately increased LV wall thickness. LVOT gradient of   30 mmHg noted.   4. Spectral Doppler shows impaired relaxation pattern of left ventricular myocardial filling (Grade I diastolic dysfunction).   5. Mildly enlarged left atrium.   6.Normal right atrial size.   7. Thickening and calcification of the anterior and posterior mitral valve leaflets.   8. Trace mitral valve regurgitation.   9. Mitral annular calcification.    < end of copied text >    PHYSICAL EXAM:  GEN: No acute distress,   Neck: swollen left neck lump is palpated, tender to touch. Right submandibular lymph node appreciated.   LUNGS: mild rhonchi at left lung base  HEART: Regular, +S1 S2  ABD: Soft, non-tender, non-distended.   EXT: no edema on LE b/l, 2+dorsalis pedes pulse, tender to palpation on LE/BRADY/Skin Intact.   NEURO: AAOX3

## 2020-12-03 LAB
ALBUMIN SERPL ELPH-MCNC: 3.7 G/DL — SIGNIFICANT CHANGE UP (ref 3.5–5.2)
ALP SERPL-CCNC: 201 U/L — HIGH (ref 30–115)
ALT FLD-CCNC: 31 U/L — SIGNIFICANT CHANGE UP (ref 0–41)
ANION GAP SERPL CALC-SCNC: 11 MMOL/L — SIGNIFICANT CHANGE UP (ref 7–14)
ANION GAP SERPL CALC-SCNC: 12 MMOL/L — SIGNIFICANT CHANGE UP (ref 7–14)
APTT BLD: 44 SEC — HIGH (ref 27–39.2)
AST SERPL-CCNC: 46 U/L — HIGH (ref 0–41)
BASOPHILS # BLD AUTO: 0.03 K/UL — SIGNIFICANT CHANGE UP (ref 0–0.2)
BASOPHILS NFR BLD AUTO: 0.6 % — SIGNIFICANT CHANGE UP (ref 0–1)
BILIRUB SERPL-MCNC: 0.2 MG/DL — SIGNIFICANT CHANGE UP (ref 0.2–1.2)
BLD GP AB SCN SERPL QL: SIGNIFICANT CHANGE UP
BUN SERPL-MCNC: 28 MG/DL — HIGH (ref 10–20)
BUN SERPL-MCNC: 30 MG/DL — HIGH (ref 10–20)
CALCIUM SERPL-MCNC: 8.8 MG/DL — SIGNIFICANT CHANGE UP (ref 8.5–10.1)
CALCIUM SERPL-MCNC: 9.3 MG/DL — SIGNIFICANT CHANGE UP (ref 8.5–10.1)
CHLORIDE SERPL-SCNC: 107 MMOL/L — SIGNIFICANT CHANGE UP (ref 98–110)
CHLORIDE SERPL-SCNC: 109 MMOL/L — SIGNIFICANT CHANGE UP (ref 98–110)
CO2 SERPL-SCNC: 21 MMOL/L — SIGNIFICANT CHANGE UP (ref 17–32)
CO2 SERPL-SCNC: 24 MMOL/L — SIGNIFICANT CHANGE UP (ref 17–32)
CREAT SERPL-MCNC: 1 MG/DL — SIGNIFICANT CHANGE UP (ref 0.7–1.5)
CREAT SERPL-MCNC: 1.5 MG/DL — SIGNIFICANT CHANGE UP (ref 0.7–1.5)
EOSINOPHIL # BLD AUTO: 0.27 K/UL — SIGNIFICANT CHANGE UP (ref 0–0.7)
EOSINOPHIL NFR BLD AUTO: 5.1 % — SIGNIFICANT CHANGE UP (ref 0–8)
GLUCOSE SERPL-MCNC: 93 MG/DL — SIGNIFICANT CHANGE UP (ref 70–99)
GLUCOSE SERPL-MCNC: 93 MG/DL — SIGNIFICANT CHANGE UP (ref 70–99)
HCT VFR BLD CALC: 39 % — SIGNIFICANT CHANGE UP (ref 37–47)
HCT VFR BLD CALC: 41.4 % — SIGNIFICANT CHANGE UP (ref 37–47)
HGB BLD-MCNC: 12.7 G/DL — SIGNIFICANT CHANGE UP (ref 12–16)
HGB BLD-MCNC: 13.1 G/DL — SIGNIFICANT CHANGE UP (ref 12–16)
IMM GRANULOCYTES NFR BLD AUTO: 0.2 % — SIGNIFICANT CHANGE UP (ref 0.1–0.3)
INR BLD: 1.22 RATIO — SIGNIFICANT CHANGE UP (ref 0.65–1.3)
LYMPHOCYTES # BLD AUTO: 1.34 K/UL — SIGNIFICANT CHANGE UP (ref 1.2–3.4)
LYMPHOCYTES # BLD AUTO: 25.3 % — SIGNIFICANT CHANGE UP (ref 20.5–51.1)
MAGNESIUM SERPL-MCNC: 2 MG/DL — SIGNIFICANT CHANGE UP (ref 1.8–2.4)
MAGNESIUM SERPL-MCNC: 2.1 MG/DL — SIGNIFICANT CHANGE UP (ref 1.8–2.4)
MCHC RBC-ENTMCNC: 31 PG — SIGNIFICANT CHANGE UP (ref 27–31)
MCHC RBC-ENTMCNC: 31.3 PG — HIGH (ref 27–31)
MCHC RBC-ENTMCNC: 31.6 G/DL — LOW (ref 32–37)
MCHC RBC-ENTMCNC: 32.6 G/DL — SIGNIFICANT CHANGE UP (ref 32–37)
MCV RBC AUTO: 96.1 FL — SIGNIFICANT CHANGE UP (ref 81–99)
MCV RBC AUTO: 97.9 FL — SIGNIFICANT CHANGE UP (ref 81–99)
MONOCYTES # BLD AUTO: 0.6 K/UL — SIGNIFICANT CHANGE UP (ref 0.1–0.6)
MONOCYTES NFR BLD AUTO: 11.3 % — HIGH (ref 1.7–9.3)
NEUTROPHILS # BLD AUTO: 3.04 K/UL — SIGNIFICANT CHANGE UP (ref 1.4–6.5)
NEUTROPHILS NFR BLD AUTO: 57.5 % — SIGNIFICANT CHANGE UP (ref 42.2–75.2)
NRBC # BLD: 0 /100 WBCS — SIGNIFICANT CHANGE UP (ref 0–0)
NRBC # BLD: 0 /100 WBCS — SIGNIFICANT CHANGE UP (ref 0–0)
PHOSPHATE SERPL-MCNC: 3.7 MG/DL — SIGNIFICANT CHANGE UP (ref 2.1–4.9)
PLATELET # BLD AUTO: 216 K/UL — SIGNIFICANT CHANGE UP (ref 130–400)
PLATELET # BLD AUTO: 221 K/UL — SIGNIFICANT CHANGE UP (ref 130–400)
POTASSIUM SERPL-MCNC: 3.7 MMOL/L — SIGNIFICANT CHANGE UP (ref 3.5–5)
POTASSIUM SERPL-MCNC: 4.4 MMOL/L — SIGNIFICANT CHANGE UP (ref 3.5–5)
POTASSIUM SERPL-SCNC: 3.7 MMOL/L — SIGNIFICANT CHANGE UP (ref 3.5–5)
POTASSIUM SERPL-SCNC: 4.4 MMOL/L — SIGNIFICANT CHANGE UP (ref 3.5–5)
PROT SERPL-MCNC: 6.1 G/DL — SIGNIFICANT CHANGE UP (ref 6–8)
PROTHROM AB SERPL-ACNC: 14 SEC — HIGH (ref 9.95–12.87)
RBC # BLD: 4.06 M/UL — LOW (ref 4.2–5.4)
RBC # BLD: 4.23 M/UL — SIGNIFICANT CHANGE UP (ref 4.2–5.4)
RBC # FLD: 13.7 % — SIGNIFICANT CHANGE UP (ref 11.5–14.5)
RBC # FLD: 13.9 % — SIGNIFICANT CHANGE UP (ref 11.5–14.5)
SODIUM SERPL-SCNC: 142 MMOL/L — SIGNIFICANT CHANGE UP (ref 135–146)
SODIUM SERPL-SCNC: 142 MMOL/L — SIGNIFICANT CHANGE UP (ref 135–146)
WBC # BLD: 5.29 K/UL — SIGNIFICANT CHANGE UP (ref 4.8–10.8)
WBC # BLD: 6.04 K/UL — SIGNIFICANT CHANGE UP (ref 4.8–10.8)
WBC # FLD AUTO: 5.29 K/UL — SIGNIFICANT CHANGE UP (ref 4.8–10.8)
WBC # FLD AUTO: 6.04 K/UL — SIGNIFICANT CHANGE UP (ref 4.8–10.8)

## 2020-12-03 PROCEDURE — 71045 X-RAY EXAM CHEST 1 VIEW: CPT | Mod: 26

## 2020-12-03 PROCEDURE — 99233 SBSQ HOSP IP/OBS HIGH 50: CPT

## 2020-12-03 PROCEDURE — 99231 SBSQ HOSP IP/OBS SF/LOW 25: CPT

## 2020-12-03 RX ORDER — ASPIRIN/CALCIUM CARB/MAGNESIUM 324 MG
81 TABLET ORAL DAILY
Refills: 0 | Status: DISCONTINUED | OUTPATIENT
Start: 2020-12-03 | End: 2020-12-04

## 2020-12-03 RX ORDER — CLOPIDOGREL BISULFATE 75 MG/1
75 TABLET, FILM COATED ORAL DAILY
Refills: 0 | Status: DISCONTINUED | OUTPATIENT
Start: 2020-12-03 | End: 2020-12-04

## 2020-12-03 RX ORDER — IPRATROPIUM/ALBUTEROL SULFATE 18-103MCG
3 AEROSOL WITH ADAPTER (GRAM) INHALATION ONCE
Refills: 0 | Status: DISCONTINUED | OUTPATIENT
Start: 2020-12-03 | End: 2020-12-04

## 2020-12-03 RX ADMIN — ATORVASTATIN CALCIUM 20 MILLIGRAM(S): 80 TABLET, FILM COATED ORAL at 21:18

## 2020-12-03 RX ADMIN — Medication 0.5 MILLIGRAM(S): at 21:18

## 2020-12-03 RX ADMIN — GABAPENTIN 300 MILLIGRAM(S): 400 CAPSULE ORAL at 21:18

## 2020-12-03 RX ADMIN — MONTELUKAST 10 MILLIGRAM(S): 4 TABLET, CHEWABLE ORAL at 11:54

## 2020-12-03 RX ADMIN — Medication 100 MILLIGRAM(S): at 05:46

## 2020-12-03 RX ADMIN — Medication 100 MILLIGRAM(S): at 17:42

## 2020-12-03 RX ADMIN — FAMOTIDINE 20 MILLIGRAM(S): 10 INJECTION INTRAVENOUS at 11:53

## 2020-12-03 RX ADMIN — DABIGATRAN ETEXILATE MESYLATE 150 MILLIGRAM(S): 150 CAPSULE ORAL at 05:46

## 2020-12-03 RX ADMIN — Medication 40 MILLIGRAM(S): at 05:46

## 2020-12-03 RX ADMIN — LAMOTRIGINE 100 MILLIGRAM(S): 25 TABLET, ORALLY DISINTEGRATING ORAL at 11:53

## 2020-12-03 RX ADMIN — GABAPENTIN 300 MILLIGRAM(S): 400 CAPSULE ORAL at 15:34

## 2020-12-03 RX ADMIN — BUDESONIDE AND FORMOTEROL FUMARATE DIHYDRATE 2 PUFF(S): 160; 4.5 AEROSOL RESPIRATORY (INHALATION) at 08:00

## 2020-12-03 RX ADMIN — GABAPENTIN 300 MILLIGRAM(S): 400 CAPSULE ORAL at 05:46

## 2020-12-03 RX ADMIN — Medication 1 SPRAY(S): at 11:54

## 2020-12-03 RX ADMIN — Medication 1 SPRAY(S): at 05:46

## 2020-12-03 RX ADMIN — BUDESONIDE AND FORMOTEROL FUMARATE DIHYDRATE 2 PUFF(S): 160; 4.5 AEROSOL RESPIRATORY (INHALATION) at 21:19

## 2020-12-03 RX ADMIN — Medication 81 MILLIGRAM(S): at 11:53

## 2020-12-03 RX ADMIN — Medication 30 MILLILITER(S): at 21:19

## 2020-12-03 RX ADMIN — AMLODIPINE BESYLATE 2.5 MILLIGRAM(S): 2.5 TABLET ORAL at 05:46

## 2020-12-03 RX ADMIN — CLOPIDOGREL BISULFATE 75 MILLIGRAM(S): 75 TABLET, FILM COATED ORAL at 11:53

## 2020-12-03 RX ADMIN — QUETIAPINE FUMARATE 200 MILLIGRAM(S): 200 TABLET, FILM COATED ORAL at 21:18

## 2020-12-03 RX ADMIN — Medication 1 SPRAY(S): at 17:42

## 2020-12-03 NOTE — SWALLOW BEDSIDE ASSESSMENT ADULT - SLP PERTINENT HISTORY OF CURRENT PROBLEM
74 y/o F presented w/ chest pain when swallowing. PMHx: neck nodules x 1 month on ultrasound, LICA stenosis, planned for carotid endarterectomy on 12/15, COPD on 5L O2. 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 No history of dysphagia.

## 2020-12-03 NOTE — PROGRESS NOTE ADULT - SUBJECTIVE AND OBJECTIVE BOX
SUBJECTIVE:    Patient is a 75y old Female who presents with a chief complaint of Odynophagia associated with chest pain (03 Dec 2020 12:17)    Currently admitted to medicine with the primary diagnosis of Chest pain.  Today is hospital day 4d. This morning she is resting comfortably in bed and reports neck swelling. Pt denies chest pain, palpitation, SOB. pt is scheduled for carotid surgery on 12/3/2020.    PAST MEDICAL & SURGICAL HISTORY  Falls    Congestive heart failure    Transient ischemic attack    FLAVIO on CPAP    Bipolar 1 disorder    Allergic reaction    PVD (peripheral vascular disease)    Other emphysema    Other cardiomyopathy    TIA (transient ischemic attack)    COPD (chronic obstructive pulmonary disease)    Depression    Hypertension    History of cholecystectomy      SOCIAL HISTORY:  Negative for smoking/alcohol/drug use.     ALLERGIES:  codeine (Other (Moderate))  Depakote (Unknown)  IV Contrast (Anaphylaxis)  losartan (Angioedema)  Risperdal (Other)  verapamil (Short breath; Angioedema)    MEDICATIONS:  STANDING MEDICATIONS  amLODIPine   Tablet 2.5 milliGRAM(s) Oral daily  aspirin  chewable 81 milliGRAM(s) Oral daily  atorvastatin 20 milliGRAM(s) Oral at bedtime  benzocaine 20% Spray 1 Spray(s) Topical every 6 hours  budesonide 160 MICROgram(s)/formoterol 4.5 MICROgram(s) Inhaler 2 Puff(s) Inhalation two times a day  clopidogrel Tablet 75 milliGRAM(s) Oral daily  famotidine    Tablet 20 milliGRAM(s) Oral daily  furosemide    Tablet 40 milliGRAM(s) Oral daily  gabapentin 300 milliGRAM(s) Oral three times a day  lamoTRIgine 100 milliGRAM(s) Oral daily  metoprolol tartrate 100 milliGRAM(s) Oral two times a day  montelukast 10 milliGRAM(s) Oral daily  QUEtiapine 200 milliGRAM(s) Oral at bedtime  tiotropium 18 MICROgram(s) Capsule 1 Capsule(s) Inhalation daily    PRN MEDICATIONS  aluminum hydroxide/magnesium hydroxide/simethicone Suspension 30 milliLiter(s) Oral every 12 hours PRN  LORazepam     Tablet 0.5 milliGRAM(s) Oral two times a day PRN    VITALS:   T(F): 97.4  HR: 66  BP: 126/59  RR: 18  SpO2: 96%    LABS:                        12.7   5.29  )-----------( 216      ( 03 Dec 2020 06:32 )             39.0     12-03    142  |  109  |  28<H>  ----------------------------<  93  3.7   |  21  |  1.0    Ca    8.8      03 Dec 2020 06:32  Mg     2.1     12-03    TPro  6.1  /  Alb  3.7  /  TBili  0.2  /  DBili  x   /  AST  46<H>  /  ALT  31  /  AlkPhos  201<H>  12-03    RADIOLOGY:    < from: MR Orbit, Face, and/or Neck No Cont (12.02.20 @ 15:03) >  IMPRESSION:    Allowing for the limitation from absence of contrast, there is a 3.0 cm lobulated intraparotid mass with microcysts involving the inferior deep and superficial lobe of the left parotid gland which has slightly enlarged compared to the prior study from 2013. This slow growing mass could represent an intraparotid mass such as pleomorphic adenoma, Paige's tumor, intraparotid schwannoma, or oncocytoma. Recommend histopathological correlation.    Stable 1 cm right thyroid lobe cyst.    < end of copied text >  < from: CT Chest No Cont (12.02.20 @ 10:01) >  IMPRESSION:  No significant left lower lobe consolidation or left pleural effusion.    New bilateral scattered small number of peripheral predominant opacities which are in a dependent position and may represent focal atelectasis.  Short-term follow-up is recommended.    Interval increase in size of the left upper lobe anterior bulla/focus of paraseptal emphysema now measuring 6.6 x 6.0 cm.    < end of copied text >    PHYSICAL EXAM:  GEN: No acute distress,   Neck: swollen left neck lump is palpated, tender to touch. Right submandibular lymph node  LUNGS: Clear to auscultation b/l  HEART: Regular, +S1 S2  ABD: Soft, non-tender, non-distended.   EXT: no edema on LE b/l, 2+dorsalis pedes pulse, tender to palpation on LE/BRADY/Skin Intact.   NEURO: AAOX3

## 2020-12-03 NOTE — PROGRESS NOTE ADULT - ASSESSMENT
Chest pain atypical ruled out for MI   nuc stress test no ischemia       carotid artery disease: spoke to Dr Cleveland from vascular and he will continue with dual antiplatelets for now and then after getting a carotid stent will d/c one of the antiplatelets since patient is also on pradaxa for afib. patient going for carotid stenting tomorrow with vascular. hold pradaxa for procedure tomorrow      patient was at [facility] in october and had TIA symptoms and spoke to Dr jaime patients cardiologist and said that she had afib when found by EMS and was cardioverted in the filed and has been in NSR since then and recommended to keep on pradxa  150 mg BID. Discussed benefits of continue  anticoagulation to prevent strokes from Afib and risks involved with  anticoagulantion including risk of bleeding, hemorrhagic stroke, GI bleed and even death. Pt agreed to stay on  anticoagulation given benifits outweighs risk.    Afib chronic now in NSR: on pradaxa 150 BID.        left periparotid mass per ENT eval get MRI without contrast of neck which showed left parotid mass spoke to ENT and recommended IR to do biopsy. spoke to patient about this we agreed on going for carotid stent first then will revisit the biopsy, we will consult IR prior to discharge     CT chest showed B/L opacities which is possibly atelectasis and radiology recommended repeat imaging later and also CT chest showed Interval increase in size of the left upper lobe anterior bulla/focus of paraseptal emphysema now measuring 6.6 x 6.0 cm. pulm follow up       COPD stable has home o2

## 2020-12-03 NOTE — PROGRESS NOTE ADULT - SUBJECTIVE AND OBJECTIVE BOX
ENT DAILY PROGRESS NOTE    Pt is a 75y Female a/w odynphagia and CP- ENT c/s for b/l palpable cervical LAD. Patient seen and examined at bedside w/ Dr. Gandhi. Patient reports having L side pain for a long time. Patient able to tolerate own oral secretion, PO liquids/solids. Patient reports baseline SOB 2/2 COPD Pt. denies recent fever, chills, voice changes, drooling.       REVIEW OF SYSTEMS   [x] A ten-point review of systems was otherwise negative except as noted.  [ ] Due to altered mental status/intubation, subjective information were not able to be obtained from patient. History was obtained, to the extent possible, from review of the chart and collateral sources of information.    Allergies    codeine (Other (Moderate))  Depakote (Unknown)  IV Contrast (Anaphylaxis)  losartan (Angioedema)  Risperdal (Other)  verapamil (Short breath; Angioedema)    Intolerances        MEDICATIONS:  aluminum hydroxide/magnesium hydroxide/simethicone Suspension 30 milliLiter(s) Oral every 12 hours PRN  amLODIPine   Tablet 2.5 milliGRAM(s) Oral daily  aspirin  chewable 81 milliGRAM(s) Oral daily  atorvastatin 20 milliGRAM(s) Oral at bedtime  benzocaine 20% Spray 1 Spray(s) Topical every 6 hours  budesonide 160 MICROgram(s)/formoterol 4.5 MICROgram(s) Inhaler 2 Puff(s) Inhalation two times a day  clopidogrel Tablet 75 milliGRAM(s) Oral daily  famotidine    Tablet 20 milliGRAM(s) Oral daily  furosemide    Tablet 40 milliGRAM(s) Oral daily  gabapentin 300 milliGRAM(s) Oral three times a day  lamoTRIgine 100 milliGRAM(s) Oral daily  LORazepam     Tablet 0.5 milliGRAM(s) Oral two times a day PRN  metoprolol tartrate 100 milliGRAM(s) Oral two times a day  montelukast 10 milliGRAM(s) Oral daily  QUEtiapine 200 milliGRAM(s) Oral at bedtime  tiotropium 18 MICROgram(s) Capsule 1 Capsule(s) Inhalation daily      Vital Signs Last 24 Hrs  T(C): 35.7 (03 Dec 2020 17:16), Max: 36.3 (03 Dec 2020 13:01)  T(F): 96.2 (03 Dec 2020 17:16), Max: 97.4 (03 Dec 2020 13:01)  HR: 77 (03 Dec 2020 17:16) (66 - 77)  BP: 135/63 (03 Dec 2020 17:16) (116/55 - 148/67)  BP(mean): --  RR: 18 (03 Dec 2020 13:01) (17 - 18)  SpO2: 96% (03 Dec 2020 08:51) (96% - 97%)      12-03 @ 07:01  -  12-03 @ 17:31  --------------------------------------------------------  IN:    Oral Fluid: 220 mL  Total IN: 220 mL    OUT:    Voided (mL): 1 mL  Total OUT: 1 mL    Total NET: 219 mL          PHYSICAL EXAM:    GEN: Well-developed, well-nourished. NAD, awake and alert. No drooling or pooling of secretions. No stridor or stertor. Good vocal quality, no hoarseness.   SKIN: Good color, non diaphoretic  HEENT: +L sided cervical LAD, TTP, NC/AT; Oral mucosa pink and moist. No erythema or edema noted to buccal mucosa, tongue, FOM, uvula or posterior oropharynx. Uvula midline  NECK:  Trachea midline. Neck supple, no TTP to B/L lateral neck, no cervical LAD.  RESP: No dyspnea, non-labored breathing. No use of accessory muscles.  CARDIO: +S1/S2  ABDO: Soft, NT.  EXT: BRADY x 4    LABS:  CBC-                        12.7   5.29  )-----------( 216      ( 03 Dec 2020 06:32 )             39.0     BMP/CMP-  03 Dec 2020 06:32    142    |  109    |  28     ----------------------------<  93     3.7     |  21     |  1.0      Ca    8.8        03 Dec 2020 06:32  Mg     2.1       03 Dec 2020 06:32    TPro  6.1    /  Alb  3.7    /  TBili  0.2    /  DBili  x      /  AST  46     /  ALT  31     /  AlkPhos  201    03 Dec 2020 06:32    Coagulation Studies-    Endocrine Panel-  Calcium, Total Serum: 8.8 mg/dL (12-03 @ 06:32)              RADIOLOGY & ADDITIONAL STUDIES:  < from: MR Orbit, Face, and/or Neck No Cont (12.02.20 @ 15:03) >  EXAM:  MR ORBIT FACE AND OR NECK            PROCEDURE DATE:  12/02/2020            INTERPRETATION:  INDICATION: Patient with history of left parotid mass.    COMPARISON: Correlated with the MR cervical spine 8/24/2013, an MRI of the brain dated 10/15/2020.    Correlation is made with CT of the cervical spine 2/8/2020.    TECHNIQUE: MRI of the neck was obtained without administration of intravenous contrast.    FINDINGS:    There is redemonstration of a lobulated 3.0 x 1.7 x 2.1 cm (TR x AP x CC) T1/T2 hypointense mass with scattered intratumoral microcysts involving the inferior deep and superficial lobes of the left parotid gland. The lesion dates back to the cervical MRI dated 8/25/2013, head was slightly smaller compared to the current study measuring 1.8 x 1.3 cm (TR x AP) increased in size from 2013 (previously measuring up to 1.8 cm). The the right left sternocleidomastoid muscle indents the posterior aspect of the mass, and the mass abuts the posterior cortex of the left mandibular angle.    There is a 0.5 cm intraparotid lymph node in the anterior aspect of the left superficial parotid lobe.    The nasopharynx, oropharynx, hypopharynx, and glottis are unremarkable.    There is a T2 hyperintense cystic lesion within the rightthyroid lobe, measuring up to 1 cm.    The submandibular glands are unremarkable.    The great vessels of the neck are unremarkable.    The osseous structures are unremarkable.    The orbits, paranasal air sinuses, and mastoid air cells are unremarkable.    Within the brain parenchyma there is subcortical and periventricular foci of T2 hyperintensity, consistent with chronic microvascular ischemic changes.  There is a 0.8 cm right choroidal fissural cyst.      IMPRESSION:    Allowing for the limitation from absence of contrast, there is a 3.0 cm lobulated intraparotid mass with microcysts involving the inferior deep and superficial lobe of the left parotid gland which has slightly enlarged compared to the prior study from 2013. This slow growing mass could represent an intraparotid mass such as pleomorphic adenoma, Wise's tumor, intraparotid schwannoma, or oncocytoma. Recommend histopathological correlation.    Stable 1 cm right thyroid lobe cyst.      SHILPA SAGASTUME M.D., RESIDENTRADIOLOGIST  This document has been electronically signed.  CHIARA SPENCER M.D., ATTENDING RADIOLOGIST    < end of copied text >

## 2020-12-03 NOTE — SWALLOW BEDSIDE ASSESSMENT ADULT - SLP GENERAL OBSERVATIONS
Pt received sitting upright in bedside chair, alert and oriented x3, no c/o pain. Pt c/o discomfort when swallowing yesterday, however has now resolved.

## 2020-12-03 NOTE — PROGRESS NOTE ADULT - ASSESSMENT
75 y.o F admitted to medicine with chest pain and odynophagia ENT called to evaluate Pt. fo B/L cervical LAD Pt. c/o dysphagia and odynophagia since 11/29/20 with some improvement today Pt. was able to eat soft food and drink water. MRI noted with 3.0 cm lobulated intraparotid mass with microcysts involving the inferior deep and superficial lobe of the left parotid gland which has slightly enlarged compared to the prior study from 2013.     Plan   - No acute ENT intervention at this time   - IR for FNA biopsy during this admission   - Patient seen and examined at bedside w/ Dr. Gandhi

## 2020-12-03 NOTE — SWALLOW BEDSIDE ASSESSMENT ADULT - NS ASR SWALLOW FINDINGS DISCUS
Week 37 of Your Pregnancy: Care Instructions  Your Care Instructions    You are near the end of your pregnancy--and you're probably pretty uncomfortable. It may be harder to walk around. Lying down probably isn't comfortable either. You may have trouble getting to sleep or staying asleep. Most women deliver their babies between 40 and 41 weeks. This is a good time to think about packing a bag for the hospital with items you'll need. Then you'll be ready when labor starts. Follow-up care is a key part of your treatment and safety. Be sure to make and go to all appointments, and call your doctor if you are having problems. It's also a good idea to know your test results and keep a list of the medicines you take. How can you care for yourself at home? Learn about breastfeeding  · Breastfeeding is best for your baby and good for you. · Breast milk has antibodies to help your baby fight infections. · Mothers who breastfeed often lose weight faster, because making milk burns calories. · Learning the best ways to hold your baby will make breastfeeding easier. · Let your partner bathe and diaper the baby to keep your partner from feeling left out. Snuggle together when you breastfeed. · You may want to learn how to use a breast pump and store your milk. · If you choose to bottle feed, make the feeding feel like breastfeeding so you can bond with your baby. Always hold your baby 1  © 3305-1521 [employer]. CHARLES Pittman/Physician/Nursing/Patient

## 2020-12-03 NOTE — PROGRESS NOTE ADULT - SUBJECTIVE AND OBJECTIVE BOX
no chest pain   no sob   doing well   will be going for carotis stent tomorrow     Vital Signs Last 24 Hrs  T(C): 36.1 (03 Dec 2020 04:43), Max: 36.2 (02 Dec 2020 12:43)  T(F): 96.9 (03 Dec 2020 04:43), Max: 97.2 (02 Dec 2020 12:43)  HR: 71 (03 Dec 2020 04:43) (69 - 77)  BP: 116/55 (03 Dec 2020 04:43) (116/55 - 148/67)  BP(mean): --  RR: 17 (03 Dec 2020 04:43) (17 - 18)  SpO2: 96% (03 Dec 2020 08:51) (96% - 97%)    PHYSICAL EXAM:  GENERAL: NAD,  Pulm: Clear to auscultation bilaterally; No wheeze  CV:: Regular rate and rhythm; No murmurs, rubs, or gallops  GI: Soft, Nontender, Nondistended; Bowel sounds present  EXTREMITIES:  2+ Peripheral Pulses, No clubbing, cyanosis, or edema  PSYCH: AAOx3  NEUROLOGY: non-focal  SKIN: No rashes or lesions                          12.7   5.29  )-----------( 216      ( 03 Dec 2020 06:32 )             39.0     12-03    142  |  109  |  28<H>  ----------------------------<  93  3.7   |  21  |  1.0    Ca    8.8      03 Dec 2020 06:32  Mg     2.1     12-03    TPro  6.1  /  Alb  3.7  /  TBili  0.2  /  DBili  x   /  AST  46<H>  /  ALT  31  /  AlkPhos  201<H>  12-03    LIVER FUNCTIONS - ( 03 Dec 2020 06:32 )  Alb: 3.7 g/dL / Pro: 6.1 g/dL / ALK PHOS: 201 U/L / ALT: 31 U/L / AST: 46 U/L / GGT: x                 MEDICATIONS  (STANDING):  amLODIPine   Tablet 2.5 milliGRAM(s) Oral daily  aspirin  chewable 81 milliGRAM(s) Oral daily  atorvastatin 20 milliGRAM(s) Oral at bedtime  benzocaine 20% Spray 1 Spray(s) Topical every 6 hours  budesonide 160 MICROgram(s)/formoterol 4.5 MICROgram(s) Inhaler 2 Puff(s) Inhalation two times a day  clopidogrel Tablet 75 milliGRAM(s) Oral daily  famotidine    Tablet 20 milliGRAM(s) Oral daily  furosemide    Tablet 40 milliGRAM(s) Oral daily  gabapentin 300 milliGRAM(s) Oral three times a day  lamoTRIgine 100 milliGRAM(s) Oral daily  metoprolol tartrate 100 milliGRAM(s) Oral two times a day  montelukast 10 milliGRAM(s) Oral daily  QUEtiapine 200 milliGRAM(s) Oral at bedtime  tiotropium 18 MICROgram(s) Capsule 1 Capsule(s) Inhalation daily    MEDICATIONS  (PRN):  aluminum hydroxide/magnesium hydroxide/simethicone Suspension 30 milliLiter(s) Oral every 12 hours PRN Dyspepsia  LORazepam     Tablet 0.5 milliGRAM(s) Oral two times a day PRN Anxiety

## 2020-12-03 NOTE — PROGRESS NOTE ADULT - ASSESSMENT
Ms. Henderson is a 75F with a PMH of COPD on home oxygen, pAfib, HFpEF, HTN, HLD, bipolar disorder presented to the ED for chest pain associated with swallowing, found to have a LLL opacity on CXR and recent COVID exposure admitted to rule out ACS.     #Neck pain  - swollen lump at the left side of the neck- Left kate-parotid palpable mass, right sided submandibular lymph node appreciated  - s/p augmentin ( patient is not improving on abx)  - Dental eval appreciated (F/U with outpatient dentist for comprehensive dental care)  - ENT consult appreciated: SLP eval   - MRI neck (12/2/2020): 3.0 cm lobulated intraparotid mass with microcysts suspicious for pleomorphic adenoma, Paige's tumor, intraparotid schwannoma, or oncocytoma.   - f/u ENT - plan for biopsy by IR    # Chest pain, most likely pleuritic (Dr. Hoffmann Cardiologist)  - Associated with odynophagia, unlikely ACS. EMS/Ed team said they saw TWI, but repeat EKG baseline.   - Trops - x2 and BNP negative. Not volume overloaded, no CHF exacerbation.   - CXR:  LLL opacity.   - CXR on 11/30: Worsening left pleural effusion/opacity.  - ECHO (12/2/2020): Hyperdynamic global left ventricular systolic function; EF of 70 %; Moderately increased LV wall thickness. Grade I diastolic dysfunction.  - stress test 12/1/2020: No ischemic EKG changes  - CT chest( 12/2/2020): left upper lobe anterior bulla/focus of paraseptal emphysema now measuring 6.6 x 6.0 cm. No significant left lower lobe consolidation or left pleural effusion. peripheral predominant opacities    # Paroxysmal atrial fibrillation  - On Pradaxa 150mg PO twice daily and ASA 81 mg   - Plavix recently added after a recent TIA last month.   - Rate controlled currently  - pt has carotid disease(evaluated by vascular Dr Huerta for transcarotid artery revascularization)- was started on plavix and patient is already on asa and pradaxa.  - vascular consult appreciated: Pt is planned for TCAR on 12/4/20. D/c Prodaxa in preparation for procedure. NPO after midnight.    # COPD  - No increase in basal need of O2, no increased cough, fever, chills, or dyspnea so exacerbation is unlikely.   - Continue home inhalers.  - triple inhaler therapy  - CXR( on admission):  LLL opacity.   - CXR on 11/30: Worsening left pleural effusion/opacity.  - Pulmonology consult appreciated  - CT chest non contrast (12/2/2020):  left upper lobe anterior bulla/focus of paraseptal emphysema now measuring 6.6 x 6.0 cm. No significant left lower lobe consolidation or left pleural effusion. peripheral predominant opacities.  - f/u Pulm    # chronic HFpEF  # HTN  # HLD  - Preserved EF according to Dr. Bishop's notes, although no echo report in [facility].   - ECHO (12/2/2020): Hyperdynamic global left ventricular systolic function; EF of 70 %; Moderately increased LV wall thickness. Grade I diastolic dysfunction.  - Continue PO diuretics and home BP and cholesterol meds    # Bipolar Disorder  - Mood stable  - Continue home meds Lamictal 100mg PO daily, Seroquel 200mg at bedtime, and Ativan 0.5mg PO twice daily PRN    GI ppx: Pepcid 10mg PO daily    Diet: DASH/TLC, NPO after midnigt  Activity: IAT   Dispo: From home   Code: FULL

## 2020-12-04 LAB
ALBUMIN SERPL ELPH-MCNC: 3.6 G/DL — SIGNIFICANT CHANGE UP (ref 3.5–5.2)
ALP SERPL-CCNC: 199 U/L — HIGH (ref 30–115)
ALT FLD-CCNC: 30 U/L — SIGNIFICANT CHANGE UP (ref 0–41)
ANION GAP SERPL CALC-SCNC: 11 MMOL/L — SIGNIFICANT CHANGE UP (ref 7–14)
ANION GAP SERPL CALC-SCNC: 12 MMOL/L — SIGNIFICANT CHANGE UP (ref 7–14)
APTT BLD: 41 SEC — HIGH (ref 27–39.2)
AST SERPL-CCNC: 44 U/L — HIGH (ref 0–41)
BASOPHILS # BLD AUTO: 0.04 K/UL — SIGNIFICANT CHANGE UP (ref 0–0.2)
BASOPHILS NFR BLD AUTO: 0.9 % — SIGNIFICANT CHANGE UP (ref 0–1)
BILIRUB SERPL-MCNC: 0.2 MG/DL — SIGNIFICANT CHANGE UP (ref 0.2–1.2)
BUN SERPL-MCNC: 27 MG/DL — HIGH (ref 10–20)
BUN SERPL-MCNC: 27 MG/DL — HIGH (ref 10–20)
CALCIUM SERPL-MCNC: 8.8 MG/DL — SIGNIFICANT CHANGE UP (ref 8.5–10.1)
CALCIUM SERPL-MCNC: 9.3 MG/DL — SIGNIFICANT CHANGE UP (ref 8.5–10.1)
CHLORIDE SERPL-SCNC: 107 MMOL/L — SIGNIFICANT CHANGE UP (ref 98–110)
CHLORIDE SERPL-SCNC: 109 MMOL/L — SIGNIFICANT CHANGE UP (ref 98–110)
CK MB CFR SERPL CALC: 1.4 NG/ML — SIGNIFICANT CHANGE UP (ref 0.6–6.3)
CK SERPL-CCNC: 24 U/L — SIGNIFICANT CHANGE UP (ref 0–225)
CO2 SERPL-SCNC: 22 MMOL/L — SIGNIFICANT CHANGE UP (ref 17–32)
CO2 SERPL-SCNC: 22 MMOL/L — SIGNIFICANT CHANGE UP (ref 17–32)
CREAT SERPL-MCNC: 1 MG/DL — SIGNIFICANT CHANGE UP (ref 0.7–1.5)
CREAT SERPL-MCNC: 1.1 MG/DL — SIGNIFICANT CHANGE UP (ref 0.7–1.5)
EOSINOPHIL # BLD AUTO: 0.29 K/UL — SIGNIFICANT CHANGE UP (ref 0–0.7)
EOSINOPHIL NFR BLD AUTO: 6.2 % — SIGNIFICANT CHANGE UP (ref 0–8)
GLUCOSE BLDC GLUCOMTR-MCNC: 122 MG/DL — HIGH (ref 70–99)
GLUCOSE SERPL-MCNC: 137 MG/DL — HIGH (ref 70–99)
GLUCOSE SERPL-MCNC: 89 MG/DL — SIGNIFICANT CHANGE UP (ref 70–99)
HCT VFR BLD CALC: 38 % — SIGNIFICANT CHANGE UP (ref 37–47)
HCT VFR BLD CALC: 38.7 % — SIGNIFICANT CHANGE UP (ref 37–47)
HGB BLD-MCNC: 12.1 G/DL — SIGNIFICANT CHANGE UP (ref 12–16)
HGB BLD-MCNC: 12.4 G/DL — SIGNIFICANT CHANGE UP (ref 12–16)
IMM GRANULOCYTES NFR BLD AUTO: 0.2 % — SIGNIFICANT CHANGE UP (ref 0.1–0.3)
INR BLD: 1.19 RATIO — SIGNIFICANT CHANGE UP (ref 0.65–1.3)
LYMPHOCYTES # BLD AUTO: 1.28 K/UL — SIGNIFICANT CHANGE UP (ref 1.2–3.4)
LYMPHOCYTES # BLD AUTO: 27.2 % — SIGNIFICANT CHANGE UP (ref 20.5–51.1)
MAGNESIUM SERPL-MCNC: 2 MG/DL — SIGNIFICANT CHANGE UP (ref 1.8–2.4)
MAGNESIUM SERPL-MCNC: 2.2 MG/DL — SIGNIFICANT CHANGE UP (ref 1.8–2.4)
MCHC RBC-ENTMCNC: 31 PG — SIGNIFICANT CHANGE UP (ref 27–31)
MCHC RBC-ENTMCNC: 31.6 PG — HIGH (ref 27–31)
MCHC RBC-ENTMCNC: 31.8 G/DL — LOW (ref 32–37)
MCHC RBC-ENTMCNC: 32 G/DL — SIGNIFICANT CHANGE UP (ref 32–37)
MCV RBC AUTO: 97.4 FL — SIGNIFICANT CHANGE UP (ref 81–99)
MCV RBC AUTO: 98.5 FL — SIGNIFICANT CHANGE UP (ref 81–99)
MONOCYTES # BLD AUTO: 0.54 K/UL — SIGNIFICANT CHANGE UP (ref 0.1–0.6)
MONOCYTES NFR BLD AUTO: 11.5 % — HIGH (ref 1.7–9.3)
NEUTROPHILS # BLD AUTO: 2.54 K/UL — SIGNIFICANT CHANGE UP (ref 1.4–6.5)
NEUTROPHILS NFR BLD AUTO: 54 % — SIGNIFICANT CHANGE UP (ref 42.2–75.2)
NRBC # BLD: 0 /100 WBCS — SIGNIFICANT CHANGE UP (ref 0–0)
NRBC # BLD: 0 /100 WBCS — SIGNIFICANT CHANGE UP (ref 0–0)
PHOSPHATE SERPL-MCNC: 3.1 MG/DL — SIGNIFICANT CHANGE UP (ref 2.1–4.9)
PLATELET # BLD AUTO: 209 K/UL — SIGNIFICANT CHANGE UP (ref 130–400)
PLATELET # BLD AUTO: 217 K/UL — SIGNIFICANT CHANGE UP (ref 130–400)
POTASSIUM SERPL-MCNC: 4 MMOL/L — SIGNIFICANT CHANGE UP (ref 3.5–5)
POTASSIUM SERPL-MCNC: 4.3 MMOL/L — SIGNIFICANT CHANGE UP (ref 3.5–5)
POTASSIUM SERPL-SCNC: 4 MMOL/L — SIGNIFICANT CHANGE UP (ref 3.5–5)
POTASSIUM SERPL-SCNC: 4.3 MMOL/L — SIGNIFICANT CHANGE UP (ref 3.5–5)
PROT SERPL-MCNC: 5.9 G/DL — LOW (ref 6–8)
PROTHROM AB SERPL-ACNC: 13.7 SEC — HIGH (ref 9.95–12.87)
RBC # BLD: 3.9 M/UL — LOW (ref 4.2–5.4)
RBC # BLD: 3.93 M/UL — LOW (ref 4.2–5.4)
RBC # FLD: 13.4 % — SIGNIFICANT CHANGE UP (ref 11.5–14.5)
RBC # FLD: 13.7 % — SIGNIFICANT CHANGE UP (ref 11.5–14.5)
SODIUM SERPL-SCNC: 141 MMOL/L — SIGNIFICANT CHANGE UP (ref 135–146)
SODIUM SERPL-SCNC: 142 MMOL/L — SIGNIFICANT CHANGE UP (ref 135–146)
TROPONIN T SERPL-MCNC: <0.01 NG/ML — SIGNIFICANT CHANGE UP
TROPONIN T SERPL-MCNC: <0.01 NG/ML — SIGNIFICANT CHANGE UP
WBC # BLD: 4.7 K/UL — LOW (ref 4.8–10.8)
WBC # BLD: 7.5 K/UL — SIGNIFICANT CHANGE UP (ref 4.8–10.8)
WBC # FLD AUTO: 4.7 K/UL — LOW (ref 4.8–10.8)
WBC # FLD AUTO: 7.5 K/UL — SIGNIFICANT CHANGE UP (ref 4.8–10.8)

## 2020-12-04 PROCEDURE — 93010 ELECTROCARDIOGRAM REPORT: CPT

## 2020-12-04 PROCEDURE — 99223 1ST HOSP IP/OBS HIGH 75: CPT

## 2020-12-04 PROCEDURE — 37215 TRANSCATH STENT CCA W/EPS: CPT

## 2020-12-04 PROCEDURE — 71045 X-RAY EXAM CHEST 1 VIEW: CPT | Mod: 26

## 2020-12-04 RX ORDER — LAMOTRIGINE 25 MG/1
100 TABLET, ORALLY DISINTEGRATING ORAL DAILY
Refills: 0 | Status: DISCONTINUED | OUTPATIENT
Start: 2020-12-04 | End: 2020-12-13

## 2020-12-04 RX ORDER — SODIUM CHLORIDE 9 MG/ML
1000 INJECTION, SOLUTION INTRAVENOUS
Refills: 0 | Status: DISCONTINUED | OUTPATIENT
Start: 2020-12-04 | End: 2020-12-04

## 2020-12-04 RX ORDER — BUDESONIDE AND FORMOTEROL FUMARATE DIHYDRATE 160; 4.5 UG/1; UG/1
2 AEROSOL RESPIRATORY (INHALATION)
Refills: 0 | Status: DISCONTINUED | OUTPATIENT
Start: 2020-12-04 | End: 2020-12-13

## 2020-12-04 RX ORDER — MORPHINE SULFATE 50 MG/1
4 CAPSULE, EXTENDED RELEASE ORAL
Refills: 0 | Status: DISCONTINUED | OUTPATIENT
Start: 2020-12-04 | End: 2020-12-04

## 2020-12-04 RX ORDER — CHLORHEXIDINE GLUCONATE 213 G/1000ML
1 SOLUTION TOPICAL
Refills: 0 | Status: DISCONTINUED | OUTPATIENT
Start: 2020-12-04 | End: 2020-12-13

## 2020-12-04 RX ORDER — SODIUM CHLORIDE 9 MG/ML
1000 INJECTION, SOLUTION INTRAVENOUS
Refills: 0 | Status: DISCONTINUED | OUTPATIENT
Start: 2020-12-04 | End: 2020-12-05

## 2020-12-04 RX ORDER — FAMOTIDINE 10 MG/ML
20 INJECTION INTRAVENOUS DAILY
Refills: 0 | Status: DISCONTINUED | OUTPATIENT
Start: 2020-12-04 | End: 2020-12-13

## 2020-12-04 RX ORDER — GABAPENTIN 400 MG/1
300 CAPSULE ORAL THREE TIMES A DAY
Refills: 0 | Status: DISCONTINUED | OUTPATIENT
Start: 2020-12-04 | End: 2020-12-13

## 2020-12-04 RX ORDER — TIOTROPIUM BROMIDE 18 UG/1
1 CAPSULE ORAL; RESPIRATORY (INHALATION) DAILY
Refills: 0 | Status: DISCONTINUED | OUTPATIENT
Start: 2020-12-04 | End: 2020-12-13

## 2020-12-04 RX ORDER — ACETAMINOPHEN 500 MG
650 TABLET ORAL EVERY 6 HOURS
Refills: 0 | Status: DISCONTINUED | OUTPATIENT
Start: 2020-12-04 | End: 2020-12-07

## 2020-12-04 RX ORDER — SODIUM CHLORIDE 9 MG/ML
250 INJECTION, SOLUTION INTRAVENOUS ONCE
Refills: 0 | Status: COMPLETED | OUTPATIENT
Start: 2020-12-04 | End: 2020-12-04

## 2020-12-04 RX ORDER — ACETAMINOPHEN 500 MG
650 TABLET ORAL EVERY 6 HOURS
Refills: 0 | Status: DISCONTINUED | OUTPATIENT
Start: 2020-12-04 | End: 2020-12-04

## 2020-12-04 RX ORDER — CLOPIDOGREL BISULFATE 75 MG/1
75 TABLET, FILM COATED ORAL DAILY
Refills: 0 | Status: DISCONTINUED | OUTPATIENT
Start: 2020-12-04 | End: 2020-12-13

## 2020-12-04 RX ORDER — IPRATROPIUM/ALBUTEROL SULFATE 18-103MCG
3 AEROSOL WITH ADAPTER (GRAM) INHALATION EVERY 4 HOURS
Refills: 0 | Status: DISCONTINUED | OUTPATIENT
Start: 2020-12-04 | End: 2020-12-08

## 2020-12-04 RX ORDER — ATORVASTATIN CALCIUM 80 MG/1
20 TABLET, FILM COATED ORAL AT BEDTIME
Refills: 0 | Status: DISCONTINUED | OUTPATIENT
Start: 2020-12-04 | End: 2020-12-13

## 2020-12-04 RX ORDER — ASPIRIN/CALCIUM CARB/MAGNESIUM 324 MG
81 TABLET ORAL DAILY
Refills: 0 | Status: DISCONTINUED | OUTPATIENT
Start: 2020-12-04 | End: 2020-12-13

## 2020-12-04 RX ORDER — PHENYLEPHRINE HYDROCHLORIDE 10 MG/ML
2 INJECTION INTRAVENOUS
Qty: 40 | Refills: 0 | Status: DISCONTINUED | OUTPATIENT
Start: 2020-12-04 | End: 2020-12-04

## 2020-12-04 RX ORDER — PHENYLEPHRINE HYDROCHLORIDE 10 MG/ML
0.1 INJECTION INTRAVENOUS
Qty: 40 | Refills: 0 | Status: DISCONTINUED | OUTPATIENT
Start: 2020-12-04 | End: 2020-12-07

## 2020-12-04 RX ORDER — ONDANSETRON 8 MG/1
4 TABLET, FILM COATED ORAL ONCE
Refills: 0 | Status: DISCONTINUED | OUTPATIENT
Start: 2020-12-04 | End: 2020-12-04

## 2020-12-04 RX ORDER — DABIGATRAN ETEXILATE MESYLATE 150 MG/1
150 CAPSULE ORAL EVERY 12 HOURS
Refills: 0 | Status: DISCONTINUED | OUTPATIENT
Start: 2020-12-04 | End: 2020-12-04

## 2020-12-04 RX ADMIN — SODIUM CHLORIDE 75 MILLILITER(S): 9 INJECTION, SOLUTION INTRAVENOUS at 12:20

## 2020-12-04 RX ADMIN — Medication 650 MILLIGRAM(S): at 16:08

## 2020-12-04 RX ADMIN — AMLODIPINE BESYLATE 2.5 MILLIGRAM(S): 2.5 TABLET ORAL at 05:20

## 2020-12-04 RX ADMIN — SODIUM CHLORIDE 250 MILLILITER(S): 9 INJECTION, SOLUTION INTRAVENOUS at 06:25

## 2020-12-04 RX ADMIN — Medication 0.5 MILLIGRAM(S): at 07:24

## 2020-12-04 RX ADMIN — Medication 1 SPRAY(S): at 05:20

## 2020-12-04 RX ADMIN — Medication 81 MILLIGRAM(S): at 16:07

## 2020-12-04 RX ADMIN — Medication 100 MILLIGRAM(S): at 05:20

## 2020-12-04 RX ADMIN — LAMOTRIGINE 100 MILLIGRAM(S): 25 TABLET, ORALLY DISINTEGRATING ORAL at 16:09

## 2020-12-04 RX ADMIN — Medication 650 MILLIGRAM(S): at 17:57

## 2020-12-04 RX ADMIN — GABAPENTIN 300 MILLIGRAM(S): 400 CAPSULE ORAL at 05:20

## 2020-12-04 RX ADMIN — Medication 0.5 MILLIGRAM(S): at 21:17

## 2020-12-04 RX ADMIN — GABAPENTIN 300 MILLIGRAM(S): 400 CAPSULE ORAL at 16:08

## 2020-12-04 RX ADMIN — PHENYLEPHRINE HYDROCHLORIDE 3.18 MICROGRAM(S)/KG/MIN: 10 INJECTION INTRAVENOUS at 12:10

## 2020-12-04 RX ADMIN — CLOPIDOGREL BISULFATE 75 MILLIGRAM(S): 75 TABLET, FILM COATED ORAL at 16:08

## 2020-12-04 RX ADMIN — Medication 40 MILLIGRAM(S): at 05:20

## 2020-12-04 NOTE — CONSULT NOTE ADULT - SUBJECTIVE AND OBJECTIVE BOX
SICU Consultation Note  ======================================================================================================  SHAUN COATES  MRN-[id number]    74yo female pmhx of COPD on 5L home oxygen, paroxysmal afib, carotid stenosis (L 80%, R 40%), HFpEF (EF 70% Dec 2020), HTN, HLD, bipolar disorder currently s/p left TCAR procedure. Patient presented to the ED with c/o of pain while eating/swallowing and associated chest pain. Patient admitted for ACS workup and while inpatient scheduled TCAR expedited. Patient was seen previously by vascular surgery during Oct 2020 admission for TIA workup which was negative for any neurological events. Carotid duplex during that admission showed >80% stenosis of left ICA and scheduled for TCAR Dec 15, 2020.    As per Hospitalist recs, patient currently on Pradaxa (held 24hrs prior to OR), ASA and plavix (started since TIA event). Patient underwent said procedure and tolerated it well, extubated in PACU. Patient has goal , started on peripheral barb. Patient to continue plavix and asa as per vascular fellow. Complaining of pain in left neck but no neurological defecits on exam.    SICU consulted for q1 neurovascular checks.    VA Duplex Carotid, Bilat (10.15.20 @ 09:49) >  20-39% stenosis of the right internal carotid artery.  > 80% stenosis of the left internal carotid artery.    NM Nuclear Stress Pharmacologic Multiple (12.01.20 @ 10:59) >  1. NORMAL LEXISCAN / REST MYOCARDIAL PERFUSION TOMOGRAPHY, WITH NO EVIDENCE FOR ISCHEMIA DURING LEXISCAN INFUSION.  2. NORMAL RESTING LEFT VENTRICULAR WALL MOTION AND WALL THICKENING.  3. LEFT VENTRICULAR EJECTION FRACTION OF  79 % WHICH IS WITHIN RANGE OF NORMAL.    TTE Echo Complete w/o Contrast w/ Doppler (12.02.20 @ 08:52) >  1. Hyperdynamic global left ventricular systolic function.   2. LV Ejection Fraction by Scott's Method with a biplane EF of 70 %.   3. Moderately increased LV wall thickness. LVOT gradient of   30 mmHg noted.   4. Spectral Doppler shows impaired relaxation pattern of left ventricular myocardial filling (Grade I diastolic dysfunction).   5. Mildly enlarged left atrium.   6.Normal right atrial size.   7. Thickening and calcification of the anterior and posterior mitral valve leaflets.   8. Trace mitral valve regurgitation.   9. Mitral annular calcification.                Past Medical History: L ICA STENOSIS >80%, COPD on 5L home O2, paroxysmal afib, carotid stenosis, HFpEF, HTN, HLD, Bipolar disorder  75y Female    Patient is a 75 year old female with hx as above who presented to the ED 2 days ago for chest pain, and odynophagia as well as L neck pain and swelling. Vascular team was consulted because patient has seen Dr. Huerta in the past for evaluation of L ICA Stenosis after a TIA. Patient is scheduled for TCAR on 12/15. Patient was started on Plavix 75 mg on 11/12 during her outpatient visit. Patient is also on Pradaxa 150 mg BID for AFib, and ASA 81. Primary team is concerned that patient is on all 3 of these medications, and they were inquiring if we would do her procedure while she was admitted.          Procedure:  OR Stats  OR Time:  45mins             EBL:          IV Fluids:       Blood Products:                UOP:      Findings-    PMH  PAST MEDICAL & SURGICAL HISTORY:  Falls    Congestive heart failure    Transient ischemic attack    FLAVIO on CPAP    Bipolar 1 disorder    Allergic reaction    PVD (peripheral vascular disease)    Other emphysema    Other cardiomyopathy    TIA (transient ischemic attack)    COPD (chronic obstructive pulmonary disease)    Depression    Hypertension    History of cholecystectomy        Home Meds:  Home Medications:  amLODIPine 2.5 mg oral tablet: 1 tab(s) orally once a day (29 Nov 2020 16:46)  aspirin 81 mg oral tablet: 1 tab(s) orally once a day (29 Nov 2020 16:46)  atorvastatin 20 mg oral tablet: 1 tab(s) orally once a day (29 Nov 2020 16:46)  budesonide-formoterol 160 mcg-4.5 mcg/inh inhalation aerosol: 2 puff(s) inhaled 2 times a day (29 Nov 2020 16:46)  furosemide 40 mg oral tablet: 1 tab(s) orally once a day (29 Nov 2020 16:46)  lamoTRIgine 100 mg oral tablet, extended release: 1 tab(s) orally once a day (29 Nov 2020 16:46)  LORazepam 0.5 mg oral tablet: 1 tab(s) orally 2 times a day, As Needed (29 Nov 2020 16:46)  Metoprolol Tartrate 100 mg oral tablet: 1 tab(s) orally 2 times a day (29 Nov 2020 16:46)  montelukast 10 mg oral tablet: 1 tab(s) orally once a day (29 Nov 2020 16:46)  Pepcid 20 mg oral tablet: 1 tab(s) orally once a day (29 Nov 2020 16:46)  Plavix 75 mg oral tablet: 1 tab(s) orally once a day (29 Nov 2020 16:46)  QUEtiapine 200 mg oral tablet: 1 tab(s) orally once a day (at bedtime) (29 Nov 2020 16:46)  tiotropium 18 mcg inhalation capsule: 1 cap(s) inhaled once a day (02 Dec 2020 13:51)         Allergies  Allergies    codeine (Other (Moderate))  Depakote (Unknown)  Dilaudid (Short breath; Rash)  IV Contrast (Anaphylaxis)  losartan (Angioedema)  Risperdal (Other)  verapamil (Short breath; Angioedema)    Intolerances         Current Medications:  acetaminophen   Tablet .. 650 milliGRAM(s) Oral every 6 hours PRN Moderate Pain (4 - 6)  aspirin  chewable 81 milliGRAM(s) Oral daily  clopidogrel Tablet 75 milliGRAM(s) Oral daily  lactated ringers. 1000 milliLiter(s) (75 mL/Hr) IV Continuous <Continuous>  ondansetron Injectable 4 milliGRAM(s) IV Push once PRN Nausea and/or Vomiting  phenylephrine    Infusion 0.1 MICROgram(s)/kG/Min (3.18 mL/Hr) IV Continuous <Continuous>      Advanced Directives: Presumed Full Code, HCP  J[place]    VITAL SIGNS, INS/OUTS (Last 24hours):    ICU Vital Signs Last 24 Hrs  T(C): 36.4 (04 Dec 2020 13:00), Max: 36.6 (04 Dec 2020 10:45)  T(F): 97.6 (04 Dec 2020 13:00), Max: 97.9 (04 Dec 2020 10:45)  HR: 65 (04 Dec 2020 13:00) (60 - 77)  BP: 108/56 (04 Dec 2020 12:15) (88/45 - 151/65)  BP(mean): 75 (04 Dec 2020 12:15) (58 - 78)  ABP: 138/65 (04 Dec 2020 13:00) (95/75 - 143/63)  ABP(mean): 76 (04 Dec 2020 13:00) (65 - 89)  RR: 22 (04 Dec 2020 13:00) (10 - 37)  SpO2: 95% (04 Dec 2020 13:00) (94% - 100%)    I&O's Summary    03 Dec 2020 07:01  -  04 Dec 2020 07:00  --------------------------------------------------------  IN: 560 mL / OUT: 1 mL / NET: 559 mL        Height (cm): 157.5 (12-04-20)  Weight (kg): 84.8 (12-04-20)  BMI (kg/m2): 34.2 (12-04-20)  BSA (m2): 1.86 (12-04-20)    Physical Exam:   ---------------------------------------------------------------------------------------  alert and oriented x3  left groin dressing in place  tender to palpation, no hematoma, soft  tongue midline, no hoarseness smile symmetrical no facial droop, no numbness or loss of sensation  equal rise of shoulder against resistant  NC in place  abdomen soft  right groin dressing in place, no hematoma, no saturation  no stephens, voiding          Tubes/Lines/Drains   ----------------------------------------------------------------------------------------------------------  [x] Peripheral IV    LABS  --------------------------------------------------------------------------------------  LFTs  LIVER FUNCTIONS - ( 04 Dec 2020 05:31 )  Alb: 3.6 g/dL / Pro: 5.9 g/dL / ALK PHOS: 199 U/L / ALT: 30 U/L / AST: 44 U/L / GGT: x             Cardiac Markers        Coagulation  PT/INR - ( 03 Dec 2020 21:27 )   PT: 14.00 sec;   INR: 1.22 ratio         PTT - ( 03 Dec 2020 21:27 )  PTT:44.0 sec    Arterial Blood Gas      Blood Sugar  CAPILLARY BLOOD GLUCOSE          Urinalysis

## 2020-12-04 NOTE — CHART NOTE - NSCHARTNOTEFT_GEN_A_CORE
Called bedside patient c/o difficult swallowing. Patient evaluated, vital stable, remains on peripheral barb. Patient tongue midline, airway intact, smile symmetrical, no hoarseness. Patient states the pain is the same she presented to the hospital for earlier this week.  Incision site no hematoma, no expansion, no dressing saturation.  Vascular team made approx 1919 s/w CLEMENCIA Mccormack, awaiting recs.

## 2020-12-04 NOTE — CONSULT NOTE ADULT - ASSESSMENT
Assessment & Plan    75y Female HD#5 POD#0 s/p left TCAR, right groin access 2/2 >80% stenosis of left ICA, recent TIA    NEURO:    Acute pain-controlled with  Tylenol, avoid narcotics    s/p carotid-q1 neurochecks    hx of TIA-on ASA    hx of bipolar disorder-holding lorazepam    RESP:     Oxygen insufficiency-wean off NC to RA as tolerate    Activity-      CARDS:       Imaging:     Labs:   phenylephrine    Infusion 0.1 <Continuous>      GI/NUTR:     Diet  Diet, Regular      GI Prophylaxis-    Bowel regimen-      /RENAL:      Monitor ANTHONY-kat in place    BUN/Cr- 28/1.0  -->,  30/1.5  -->,  27/1.0  -->  Electrolytes-Na 142 // K 4.3 // Mg 2.2 //  Phos -- 12-04 @ 05:31  Na 142 // K 4.4 // Mg 2.0 //  Phos 3.7 12-03 @ 21:27                 HEME/ONC:     DVT prophylaxis-    Acute anemia-H/H 12.1 (12-04 @ 11:22)  12.4 (12-04 @ 05:31)  13.1 (12-03 @ 21:27)      ID:      ENDO:    Glucose Glucose, Serum: 89 (12-04 @ 05:31)    LINES/DRAINS:  [place] NUÑEZ Foley     DISPO:    SICU d/w Dr. Galvin Assessment & Plan    75y Female HD#5 POD#0 s/p left TCAR, right groin access 2/2 >80% stenosis of left ICA, recent TIA    NEURO:    Acute pain-controlled with  Tylenol, avoid narcotics    s/p carotid-q1 neurochecks    hx of TIA-on ASA    hx of bipolar disorder-holding lorazepam, seroquel 2/2 q1 neurochecks    RESP:     Oxygen insufficiency-wean to 5L nc (on home 5L oxygen)    hx of COPD-restarted montelukast, tiotropium    hx of smoking (quit 10+yrs)-encourage IS    Pulm c/s-recs triple inhaler therapy, keep sat >92%    Activity-bedrest      CARDS:     SBP goal 140-started on barb currently at 0.3mcg/kg/hr    hx of paroxAfib-on pradaxa, held 2/2 to OR, f/u with vasc cardiology to restart (Dr. Carney)    hx of hld-restarted atorvastatin    Imaging: post op EKG NSR    Labs: CEx1 negative, f/u trend    GI/NUTR:     Diet-Regular    GI Prophylaxis-Pepcid (home rx)    /RENAL:     Monitor UO-no stephens in place, if no void by 6pm, bladder scan    BUN/Cr- 28/1.0  -->,  30/1.5  -->,  27/1.0     Electrolytes-Na 142 // K 4.3 // Mg 2.2 //  Phos -- 12-04 @ 05:31    Na 142 // K 4.4 // Mg 2.0 //  Phos 3.7 12-03 @ 21:27    HEME/ONC:     DVT prophylaxis-holding dvt prophylaxis    Hb/Hct:  13.1/41.4  -->,  12.4/38.7  -->,  12.1/38.0  -->                            Plts:  221  -->,  209  -->,  217  -->    ID:    no post op abx as per vascular fellow, d/w Dr. Esparza    ENDO:    Glucose Glucose, Serum: 89 (12-04 @ 05:31)    LINES/DRAINS:  PIV, [place]ka    DISPO:    SICU d/w Dr. Galvin

## 2020-12-04 NOTE — PROGRESS NOTE ADULT - ASSESSMENT
Ms. Henderson is a 75F with a PMH of COPD on home oxygen, pAfib, HFpEF, HTN, HLD, bipolar disorder presented to the ED for chest pain associated with swallowing, found to have a LLL opacity on CXR and recent COVID exposure admitted to rule out ACS.     #Neck pain  - swollen lump at the left side of the neck- Left kate-parotid palpable mass, right sided submandibular lymph node appreciated  - s/p augmentin ( patient is not improving on abx)  - Dental eval appreciated (F/U with outpatient dentist for comprehensive dental care)  - ENT consult appreciated- pt requires parotid mass biopsy  - MRI neck (12/2/2020): 3.0 cm lobulated intraparotid mass with microcysts suspicious for pleomorphic adenoma, [place]'s tumor, intraparotid schwannoma, or oncocytoma.   - consult IR- for mass bipsy    # Chest pain, most likely pleuritic (Dr. Hoffmann Cardiologist)  - Associated with odynophagia, unlikely ACS. EMS/Ed team said they saw TWI, but repeat EKG baseline.   - Trops - x2 and BNP negative. Not volume overloaded, no CHF exacerbation.   - CXR:  LLL opacity.   - CXR on 11/30: Worsening left pleural effusion/opacity.  - ECHO (12/2/2020): Hyperdynamic global left ventricular systolic function; EF of 70 %; Moderately increased LV wall thickness. Grade I diastolic dysfunction.  - stress test 12/1/2020: No ischemic EKG changes  - CT chest( 12/2/2020): left upper lobe anterior bulla/focus of paraseptal emphysema now measuring 6.6 x 6.0 cm. No significant left lower lobe consolidation or left pleural effusion. peripheral predominant opacities    # Paroxysmal atrial fibrillation  - On Pradaxa 150mg PO twice daily and ASA 81 mg   - Plavix recently added after a recent TIA last month.   - Rate controlled currently  - pt has carotid disease (following vascular Dr Huerta- was started on plavix and patient is already on asa and pradaxa. Was planned for  transcarotid artery revascularization on 12/15)  - vascular consult appreciated: s/p TCAR on 12/4/20.  Prodaxa was held in preparation for procedure.   - f/u Vascular recs    # COPD  - No increase in basal need of O2, no increased cough, fever, chills, or dyspnea so exacerbation is unlikely.   - Continue home inhalers.  - triple inhaler therapy  - CXR( on admission):  LLL opacity.   - CXR on 11/30: Worsening left pleural effusion/opacity.  - CXR (12/3): Improving left lung airspace opacities.  - Pulmonology consult appreciated  - CT chest non contrast (12/2/2020):  left upper lobe anterior bulla/focus of paraseptal emphysema now measuring 6.6 x 6.0 cm. No significant left lower lobe consolidation or left pleural effusion. peripheral predominant opacities.  - f/u Pulm    # chronic HFpEF  # HTN  # HLD  - Preserved EF according to Dr. Bishop's notes, although no echo report in [place].   - ECHO (12/2/2020): Hyperdynamic global left ventricular systolic function; EF of 70 %; Moderately increased LV wall thickness. Grade I diastolic dysfunction.  - Continue PO diuretics and home BP and cholesterol meds    # Bipolar Disorder  - Mood stable  - Continue home meds Lamictal 100mg PO daily, Seroquel 200mg at bedtime, and Ativan 0.5mg PO twice daily PRN    GI ppx: Pepcid 10mg PO daily    Diet: DASH/TLC,  Activity: IAT   Dispo: From home   Code: FULL

## 2020-12-04 NOTE — PRE-OP CHECKLIST - SELECT TESTS ORDERED
COVID/Urinalysis/BMP/PT/PTT/INR/Hepatic Function/EKG/CBC/CMP/Type and Cross/CXR/Type and Screen/Sickle Cell (black patients 3mos-10yr)

## 2020-12-04 NOTE — PROGRESS NOTE ADULT - SUBJECTIVE AND OBJECTIVE BOX
SUBJECTIVE:    Patient is a 75y old Female who presents with a chief complaint of Odynophagia associated with chest pain (04 Dec 2020 13:22)    Currently admitted to medicine with the primary diagnosis of Chest pain    Today is hospital day 5d.   Patient had her carotid stent placement today per vascular team.  I was unable to assess patient as she was in surgical unit.    PAST MEDICAL & SURGICAL HISTORY  Falls    Congestive heart failure    Transient ischemic attack    FLAVIO on CPAP    Bipolar 1 disorder    Allergic reaction    PVD (peripheral vascular disease)    Other emphysema    Other cardiomyopathy    TIA (transient ischemic attack)    COPD (chronic obstructive pulmonary disease)    Depression    Hypertension    History of cholecystectomy    ALLERGIES:  codeine (Other (Moderate))  Depakote (Unknown)  Dilaudid (Short breath; Rash)  IV Contrast (Anaphylaxis)  losartan (Angioedema)  Risperdal (Other)  verapamil (Short breath; Angioedema)    MEDICATIONS:  STANDING MEDICATIONS  albuterol/ipratropium for Nebulization 3 milliLiter(s) Nebulizer every 4 hours  aspirin  chewable 81 milliGRAM(s) Oral daily  budesonide 160 MICROgram(s)/formoterol 4.5 MICROgram(s) Inhaler 2 Puff(s) Inhalation two times a day  clopidogrel Tablet 75 milliGRAM(s) Oral daily  famotidine    Tablet 20 milliGRAM(s) Oral daily  gabapentin 300 milliGRAM(s) Oral three times a day  lactated ringers. 1000 milliLiter(s) IV Continuous <Continuous>  lamoTRIgine 100 milliGRAM(s) Oral daily  phenylephrine    Infusion 0.1 MICROgram(s)/kG/Min IV Continuous <Continuous>  tiotropium 18 MICROgram(s) Capsule 1 Capsule(s) Inhalation daily    PRN MEDICATIONS  acetaminophen   Tablet .. 650 milliGRAM(s) Oral every 6 hours PRN    VITALS:   T(F): 97.6  HR: 64  BP: 108/56  RR: 16  SpO2: 95%    LABS:                        12.1   7.50  )-----------( 217      ( 04 Dec 2020 11:22 )             38.0     12-04    142  |  109  |  27<H>  ----------------------------<  89  4.3   |  22  |  1.0    Ca    8.8      04 Dec 2020 05:31  Phos  3.7     12-03  Mg     2.2     12-04    TPro  5.9<L>  /  Alb  3.6  /  TBili  0.2  /  DBili  x   /  AST  44<H>  /  ALT  30  /  AlkPhos  199<H>  12-04    PT/INR - ( 03 Dec 2020 21:27 )   PT: 14.00 sec;   INR: 1.22 ratio         PTT - ( 03 Dec 2020 21:27 )  PTT:44.0 sec      Creatine Kinase, Serum: 24 U/L (12-04-20 @ 12:22)  Troponin T, Serum: <0.01 ng/mL (12-04-20 @ 11:22)      CARDIAC MARKERS ( 04 Dec 2020 12:22 )  x     / x     / 24 U/L / x     / x      CARDIAC MARKERS ( 04 Dec 2020 11:22 )  x     / <0.01 ng/mL / x     / x     / 1.4 ng/mL    RADIOLOGY:    < from: Xray Chest 1 View-PORTABLE IMMEDIATE (Xray Chest 1 View-PORTABLE IMMEDIATE .) (12.03.20 @ 20:28) >    IMPRESSION:    Improving left lung airspace opacities.    < end of copied text >  < from: MR Orbit, Face, and/or Neck No Cont (12.02.20 @ 15:03) >  IMPRESSION:    Allowing for the limitation from absence of contrast, there is a 3.0 cm lobulated intraparotid mass with microcysts involving the inferior deep and superficial lobe of the left parotid gland which has slightly enlarged compared to the prior study from 2013. This slow growing mass could represent an intraparotid mass such as pleomorphic adenoma, [place]'s tumor, intraparotid schwannoma, or oncocytoma. Recommend histopathological correlation.    Stable 1 cm right thyroid lobe cyst.    < end of copied text >  < from: CT Chest No Cont (12.02.20 @ 10:01) >  IMPRESSION:  No significant left lower lobe consolidation or left pleural effusion.    New bilateral scattered small number of peripheral predominant opacities which are in a dependent position and may represent focal atelectasis.  Short-term follow-up is recommended.    Interval increase in size of the left upper lobe anterior bulla/focus of paraseptal emphysema now measuring 6.6 x 6.0 cm.    < end of copied text >    PHYSICAL EXAM:  not performed

## 2020-12-04 NOTE — CHART NOTE - NSCHARTNOTEFT_GEN_A_CORE
PACU ANESTHESIA ADMISSION NOTE      Procedure: Carotid artery stenting      Post op diagnosis:  Carotid stenosis, left        ____  Intubated  TV:______       Rate: ______      FiO2: ______    _x___  Patent Airway    _x___  Full return of protective reflexes    _x___  Full recovery from anesthesia / back to baseline status    Vitals:see anesthesia record    Mental Status:  _x___ Awake   _____ Alert   _____ Drowsy   _____ Sedated    Nausea/Vomiting:  _x___  NO       ______Yes,   See Post - Op Orders         Pain Scale (0-10):  _____    Treatment: ____ None    __x__ See Post - Op/PCA Orders    Post - Operative Fluids:   ____ Oral   ___x_ See Post - Op Orders    Plan: Discharge:   ____Home       _____Floor     _x____Critical Care    _____  Other:_________________    Comments:  No anesthesia issues or complications noted.  Discharge when criteria met.

## 2020-12-05 LAB
ALBUMIN SERPL ELPH-MCNC: 3.5 G/DL — SIGNIFICANT CHANGE UP (ref 3.5–5.2)
ALP SERPL-CCNC: 182 U/L — HIGH (ref 30–115)
ALT FLD-CCNC: 23 U/L — SIGNIFICANT CHANGE UP (ref 0–41)
ANION GAP SERPL CALC-SCNC: 10 MMOL/L — SIGNIFICANT CHANGE UP (ref 7–14)
ANION GAP SERPL CALC-SCNC: 11 MMOL/L — SIGNIFICANT CHANGE UP (ref 7–14)
AST SERPL-CCNC: 31 U/L — SIGNIFICANT CHANGE UP (ref 0–41)
BASOPHILS # BLD AUTO: 0.01 K/UL — SIGNIFICANT CHANGE UP (ref 0–0.2)
BASOPHILS NFR BLD AUTO: 0.1 % — SIGNIFICANT CHANGE UP (ref 0–1)
BILIRUB SERPL-MCNC: 0.8 MG/DL — SIGNIFICANT CHANGE UP (ref 0.2–1.2)
BUN SERPL-MCNC: 22 MG/DL — HIGH (ref 10–20)
BUN SERPL-MCNC: 22 MG/DL — HIGH (ref 10–20)
CALCIUM SERPL-MCNC: 9 MG/DL — SIGNIFICANT CHANGE UP (ref 8.5–10.1)
CALCIUM SERPL-MCNC: 9.2 MG/DL — SIGNIFICANT CHANGE UP (ref 8.5–10.1)
CHLORIDE SERPL-SCNC: 105 MMOL/L — SIGNIFICANT CHANGE UP (ref 98–110)
CHLORIDE SERPL-SCNC: 108 MMOL/L — SIGNIFICANT CHANGE UP (ref 98–110)
CO2 SERPL-SCNC: 21 MMOL/L — SIGNIFICANT CHANGE UP (ref 17–32)
CO2 SERPL-SCNC: 22 MMOL/L — SIGNIFICANT CHANGE UP (ref 17–32)
CREAT SERPL-MCNC: 0.8 MG/DL — SIGNIFICANT CHANGE UP (ref 0.7–1.5)
CREAT SERPL-MCNC: 0.9 MG/DL — SIGNIFICANT CHANGE UP (ref 0.7–1.5)
EOSINOPHIL # BLD AUTO: 0 K/UL — SIGNIFICANT CHANGE UP (ref 0–0.7)
EOSINOPHIL NFR BLD AUTO: 0 % — SIGNIFICANT CHANGE UP (ref 0–8)
GLUCOSE BLDC GLUCOMTR-MCNC: 106 MG/DL — HIGH (ref 70–99)
GLUCOSE SERPL-MCNC: 124 MG/DL — HIGH (ref 70–99)
GLUCOSE SERPL-MCNC: 127 MG/DL — HIGH (ref 70–99)
HCT VFR BLD CALC: 35.3 % — LOW (ref 37–47)
HCT VFR BLD CALC: 35.9 % — LOW (ref 37–47)
HGB BLD-MCNC: 11.5 G/DL — LOW (ref 12–16)
HGB BLD-MCNC: 11.7 G/DL — LOW (ref 12–16)
IMM GRANULOCYTES NFR BLD AUTO: 0.5 % — HIGH (ref 0.1–0.3)
LYMPHOCYTES # BLD AUTO: 1.33 K/UL — SIGNIFICANT CHANGE UP (ref 1.2–3.4)
LYMPHOCYTES # BLD AUTO: 12.1 % — LOW (ref 20.5–51.1)
MAGNESIUM SERPL-MCNC: 2.1 MG/DL — SIGNIFICANT CHANGE UP (ref 1.8–2.4)
MAGNESIUM SERPL-MCNC: 2.1 MG/DL — SIGNIFICANT CHANGE UP (ref 1.8–2.4)
MCHC RBC-ENTMCNC: 31.1 PG — HIGH (ref 27–31)
MCHC RBC-ENTMCNC: 31.6 PG — HIGH (ref 27–31)
MCHC RBC-ENTMCNC: 32.6 G/DL — SIGNIFICANT CHANGE UP (ref 32–37)
MCHC RBC-ENTMCNC: 32.6 G/DL — SIGNIFICANT CHANGE UP (ref 32–37)
MCV RBC AUTO: 95.5 FL — SIGNIFICANT CHANGE UP (ref 81–99)
MCV RBC AUTO: 97 FL — SIGNIFICANT CHANGE UP (ref 81–99)
MONOCYTES # BLD AUTO: 0.63 K/UL — HIGH (ref 0.1–0.6)
MONOCYTES NFR BLD AUTO: 5.7 % — SIGNIFICANT CHANGE UP (ref 1.7–9.3)
NEUTROPHILS # BLD AUTO: 9 K/UL — HIGH (ref 1.4–6.5)
NEUTROPHILS NFR BLD AUTO: 81.6 % — HIGH (ref 42.2–75.2)
NRBC # BLD: 0 /100 WBCS — SIGNIFICANT CHANGE UP (ref 0–0)
NRBC # BLD: 0 /100 WBCS — SIGNIFICANT CHANGE UP (ref 0–0)
PHOSPHATE SERPL-MCNC: 2.9 MG/DL — SIGNIFICANT CHANGE UP (ref 2.1–4.9)
PLATELET # BLD AUTO: 253 K/UL — SIGNIFICANT CHANGE UP (ref 130–400)
PLATELET # BLD AUTO: 259 K/UL — SIGNIFICANT CHANGE UP (ref 130–400)
POTASSIUM SERPL-MCNC: 4.3 MMOL/L — SIGNIFICANT CHANGE UP (ref 3.5–5)
POTASSIUM SERPL-MCNC: 4.3 MMOL/L — SIGNIFICANT CHANGE UP (ref 3.5–5)
POTASSIUM SERPL-SCNC: 4.3 MMOL/L — SIGNIFICANT CHANGE UP (ref 3.5–5)
POTASSIUM SERPL-SCNC: 4.3 MMOL/L — SIGNIFICANT CHANGE UP (ref 3.5–5)
PROT SERPL-MCNC: 6 G/DL — SIGNIFICANT CHANGE UP (ref 6–8)
RBC # BLD: 3.64 M/UL — LOW (ref 4.2–5.4)
RBC # BLD: 3.76 M/UL — LOW (ref 4.2–5.4)
RBC # FLD: 13.3 % — SIGNIFICANT CHANGE UP (ref 11.5–14.5)
RBC # FLD: 13.3 % — SIGNIFICANT CHANGE UP (ref 11.5–14.5)
SODIUM SERPL-SCNC: 137 MMOL/L — SIGNIFICANT CHANGE UP (ref 135–146)
SODIUM SERPL-SCNC: 140 MMOL/L — SIGNIFICANT CHANGE UP (ref 135–146)
TROPONIN T SERPL-MCNC: <0.01 NG/ML — SIGNIFICANT CHANGE UP
TROPONIN T SERPL-MCNC: <0.01 NG/ML — SIGNIFICANT CHANGE UP
WBC # BLD: 11.02 K/UL — HIGH (ref 4.8–10.8)
WBC # BLD: 9.58 K/UL — SIGNIFICANT CHANGE UP (ref 4.8–10.8)
WBC # FLD AUTO: 11.02 K/UL — HIGH (ref 4.8–10.8)
WBC # FLD AUTO: 9.58 K/UL — SIGNIFICANT CHANGE UP (ref 4.8–10.8)

## 2020-12-05 PROCEDURE — 99223 1ST HOSP IP/OBS HIGH 75: CPT

## 2020-12-05 PROCEDURE — 71045 X-RAY EXAM CHEST 1 VIEW: CPT | Mod: 26

## 2020-12-05 PROCEDURE — 70450 CT HEAD/BRAIN W/O DYE: CPT | Mod: 26

## 2020-12-05 PROCEDURE — 0042T: CPT

## 2020-12-05 PROCEDURE — 93010 ELECTROCARDIOGRAM REPORT: CPT

## 2020-12-05 PROCEDURE — 99233 SBSQ HOSP IP/OBS HIGH 50: CPT

## 2020-12-05 RX ORDER — QUETIAPINE FUMARATE 200 MG/1
200 TABLET, FILM COATED ORAL AT BEDTIME
Refills: 0 | Status: DISCONTINUED | OUTPATIENT
Start: 2020-12-05 | End: 2020-12-13

## 2020-12-05 RX ORDER — HYDROCORTISONE 20 MG
200 TABLET ORAL ONCE
Refills: 0 | Status: COMPLETED | OUTPATIENT
Start: 2020-12-05 | End: 2020-12-05

## 2020-12-05 RX ORDER — DABIGATRAN ETEXILATE MESYLATE 150 MG/1
150 CAPSULE ORAL EVERY 12 HOURS
Refills: 0 | Status: DISCONTINUED | OUTPATIENT
Start: 2020-12-05 | End: 2020-12-13

## 2020-12-05 RX ORDER — DIPHENHYDRAMINE HCL 50 MG
50 CAPSULE ORAL ONCE
Refills: 0 | Status: COMPLETED | OUTPATIENT
Start: 2020-12-05 | End: 2020-12-05

## 2020-12-05 RX ADMIN — Medication 81 MILLIGRAM(S): at 13:36

## 2020-12-05 RX ADMIN — ATORVASTATIN CALCIUM 20 MILLIGRAM(S): 80 TABLET, FILM COATED ORAL at 21:22

## 2020-12-05 RX ADMIN — Medication 200 MILLIGRAM(S): at 10:08

## 2020-12-05 RX ADMIN — CHLORHEXIDINE GLUCONATE 1 APPLICATION(S): 213 SOLUTION TOPICAL at 05:24

## 2020-12-05 RX ADMIN — DABIGATRAN ETEXILATE MESYLATE 150 MILLIGRAM(S): 150 CAPSULE ORAL at 20:32

## 2020-12-05 RX ADMIN — QUETIAPINE FUMARATE 200 MILLIGRAM(S): 200 TABLET, FILM COATED ORAL at 23:01

## 2020-12-05 RX ADMIN — CLOPIDOGREL BISULFATE 75 MILLIGRAM(S): 75 TABLET, FILM COATED ORAL at 13:36

## 2020-12-05 RX ADMIN — GABAPENTIN 300 MILLIGRAM(S): 400 CAPSULE ORAL at 00:34

## 2020-12-05 RX ADMIN — Medication 0.5 MILLIGRAM(S): at 23:01

## 2020-12-05 RX ADMIN — BUDESONIDE AND FORMOTEROL FUMARATE DIHYDRATE 2 PUFF(S): 160; 4.5 AEROSOL RESPIRATORY (INHALATION) at 22:00

## 2020-12-05 RX ADMIN — FAMOTIDINE 20 MILLIGRAM(S): 10 INJECTION INTRAVENOUS at 13:36

## 2020-12-05 RX ADMIN — GABAPENTIN 300 MILLIGRAM(S): 400 CAPSULE ORAL at 21:22

## 2020-12-05 RX ADMIN — Medication 50 MILLIGRAM(S): at 10:07

## 2020-12-05 RX ADMIN — Medication 0.5 MILLIGRAM(S): at 13:35

## 2020-12-05 RX ADMIN — GABAPENTIN 300 MILLIGRAM(S): 400 CAPSULE ORAL at 06:10

## 2020-12-05 RX ADMIN — LAMOTRIGINE 100 MILLIGRAM(S): 25 TABLET, ORALLY DISINTEGRATING ORAL at 13:35

## 2020-12-05 RX ADMIN — GABAPENTIN 300 MILLIGRAM(S): 400 CAPSULE ORAL at 13:35

## 2020-12-05 RX ADMIN — ATORVASTATIN CALCIUM 20 MILLIGRAM(S): 80 TABLET, FILM COATED ORAL at 00:34

## 2020-12-05 NOTE — CONSULT NOTE ADULT - ASSESSMENT
Impression  -TIA vs Seizures after reperfusion  -Continue with Neuro checks q2 hours for 24 hours.   -Optimize the SBP range between 140-160 for 24 hours.   -May resume full anticoagulation and anti platelets as per the primary team  -Seizure and Aspiration precautions. Dysphagia screen.   -Confirm from [facility] if the carotid stents are MRI compatible. Then proceed with MRI head non contrast.   -Please get Routine EEG.  -Plan Discussed with the primary team Impression  -TIA vs Seizures after reperfusion  -Continue with Neuro checks q2 hours for 24 hours.   -Optimize the SBP range between 140-160 for 24 hours.   -May resume full anticoagulation and anti platelets as per the primary team  -Seizure and Aspiration precautions. Dysphagia screen.   -Confirm from [facility] if the carotid stents are MRI compatible. Then proceed with MRI head non contrast.   -Please get Routine EEG. Please check Ammonia levels and TSH  -Plan Discussed with the primary team 74 yo LHF w/ h/o COPD. Depression/BPD, CHF, HTN, LFAVIO, PVD, TIA currently s/p L carotid revascularization w/ stenting currently found this morning with difficulty speaking without obvious signs of stroke/aphasia but in setting of asterixis possible mild encephalopathy.  r/o CVA, seizure.      Recommendations:  -Optimize the SBP range between 140-160 for 24 hours.   -May resume full anticoagulation and anti platelets as per the primary team  -Aspiration precautions. Dysphagia screen.   -Confirm from [facility] if the carotid stents are MRI compatible, if so proceed w/ MRI head non contrast.   -Please get Routine EEG. Please check Ammonia levels and TSH  -Plan Discussed with the primary team

## 2020-12-05 NOTE — PROGRESS NOTE ADULT - SUBJECTIVE AND OBJECTIVE BOX
SHAUN COATES  75y Female   [id number]    Procedure/dx: s/p left carotid artery stenting  Events of the Last 24h: went to OR for left carotid artery stenting. post op hypotensive, but BP stable now. was c/o difficulty swallowing post op which improved    Patient is a 75y old  Female who presents with a chief complaint of Odynophagia associated with chest pain (04 Dec 2020 14:29)    PAST MEDICAL & SURGICAL HISTORY:  Falls    Congestive heart failure    Transient ischemic attack    FLAVIO on CPAP    Bipolar 1 disorder    Allergic reaction    PVD (peripheral vascular disease)    Other emphysema    Other cardiomyopathy    TIA (transient ischemic attack)    COPD (chronic obstructive pulmonary disease)    Depression    Hypertension    History of cholecystectomy    Vital Signs Last 24 Hrs  T(C): 36.1 (04 Dec 2020 23:30), Max: 36.6 (04 Dec 2020 10:45)  T(F): 97 (04 Dec 2020 23:30), Max: 97.9 (04 Dec 2020 10:45)  HR: 58 (05 Dec 2020 01:00) (56 - 79)  BP: 108/56 (04 Dec 2020 12:15) (88/45 - 151/65)  BP(mean): 75 (04 Dec 2020 12:15) (58 - 78)  RR: 18 (04 Dec 2020 19:00) (10 - 37)  SpO2: 97% (05 Dec 2020 01:00) (94% - 100%)    Diet, NPO:   Except Medications (12-04-20 @ 22:41)      I&O's Detail    03 Dec 2020 07:01  -  04 Dec 2020 07:00  --------------------------------------------------------  IN:    Lactated Ringers Bolus: 250 mL    Oral Fluid: 310 mL  Total IN: 560 mL    OUT:    Voided (mL): 1 mL  Total OUT: 1 mL    Total NET: 559 mL      04 Dec 2020 07:01  -  05 Dec 2020 02:12  --------------------------------------------------------  IN:    Lactated Ringers: 150 mL    Lactated Ringers: 975 mL    Phenylephrine: 164.5 mL  Total IN: 1289.5 mL    OUT:    Voided (mL): 230 mL  Total OUT: 230 mL    Total NET: 1059.5 m      MEDICATIONS  (STANDING):  albuterol/ipratropium for Nebulization 3 milliLiter(s) Nebulizer every 4 hours  aspirin  chewable 81 milliGRAM(s) Oral daily  atorvastatin 20 milliGRAM(s) Oral at bedtime  budesonide 160 MICROgram(s)/formoterol 4.5 MICROgram(s) Inhaler 2 Puff(s) Inhalation two times a day  chlorhexidine 4% Liquid 1 Application(s) Topical <User Schedule>  clopidogrel Tablet 75 milliGRAM(s) Oral daily  famotidine    Tablet 20 milliGRAM(s) Oral daily  gabapentin 300 milliGRAM(s) Oral three times a day  lactated ringers. 1000 milliLiter(s) (75 mL/Hr) IV Continuous <Continuous>  lamoTRIgine 100 milliGRAM(s) Oral daily  phenylephrine    Infusion 0.1 MICROgram(s)/kG/Min (3.18 mL/Hr) IV Continuous <Continuous>  tiotropium 18 MICROgram(s) Capsule 1 Capsule(s) Inhalation daily    MEDICATIONS  (PRN):  acetaminophen   Tablet .. 650 milliGRAM(s) Oral every 6 hours PRN Moderate Pain (4 - 6)  LORazepam     Tablet 0.5 milliGRAM(s) Oral every 6 hours PRN Anxiety    PHYSICAL EXAM:  GENERAL: NAD,  NECK:  Supple. No JVD. dressing in place over left neck. no bleeding or discharge. no hematoma  CHEST/LUNG: Clear to auscultation bilaterally;   HEART: S1 and S2 noted  ABDOMEN: Soft, Nontender, Nondistended;  PSYCH: AAOx3    LABS:                         12.1   7.50  )-----------( 217      ( 04 Dec 2020 11:22 )             38.0        12-04    141  |  107  |  27<H>  ----------------------------<  137<H>  4.0   |  22  |  1.1    Ca    9.3      04 Dec 2020 14:55  Phos  3.1     12-04  Mg     2.0     12-04    TPro  5.9<L>  /  Alb  3.6  /  TBili  0.2  /  DBili  x   /  AST  44<H>  /  ALT  30  /  AlkPhos  199<H>  12-04    LIVER FUNCTIONS - ( 04 Dec 2020 05:31 )  Alb: 3.6 g/dL / Pro: 5.9 g/dL / ALK PHOS: 199 U/L / ALT: 30 U/L / AST: 44 U/L / GGT: x           PT/INR - ( 04 Dec 2020 14:55 )   PT: 13.70 sec;   INR: 1.19 ratio         PTT - ( 04 Dec 2020 14:55 )  PTT:41.0 sec  CARDIAC MARKERS ( 04 Dec 2020 16:30 )  x     / <0.01 ng/mL / x     / x     / x      CARDIAC MARKERS ( 04 Dec 2020 12:22 )  x     / x     / 24 U/L / x     / x      CARDIAC MARKERS ( 04 Dec 2020 11:22 )  x     / <0.01 ng/mL / x     / x     / 1.4 ng/mL

## 2020-12-05 NOTE — PROGRESS NOTE ADULT - SUBJECTIVE AND OBJECTIVE BOX
SHAUN COATES  [id number]  75y Female    Indication for ICU admission:  TCAR     Admit Date:11-29-20  ICU Date: 12/4  OR Date: 12/4     codeine (Other (Moderate))  Depakote (Unknown)  Dilaudid (Short breath; Rash)  IV Contrast (Anaphylaxis)  losartan (Angioedema)  Risperdal (Other)  verapamil (Short breath; Angioedema)    PAST MEDICAL & SURGICAL HISTORY:  Falls    Congestive heart failure    Transient ischemic attack    FLAVIO on CPAP    Bipolar 1 disorder    Allergic reaction    PVD (peripheral vascular disease)    Other emphysema    Other cardiomyopathy    TIA (transient ischemic attack)    COPD (chronic obstructive pulmonary disease)    Depression    Hypertension    History of cholecystectomy      Home Medications:  amLODIPine 2.5 mg oral tablet: 1 tab(s) orally once a day (29 Nov 2020 16:46)  aspirin 81 mg oral tablet: 1 tab(s) orally once a day (29 Nov 2020 16:46)  atorvastatin 20 mg oral tablet: 1 tab(s) orally once a day (29 Nov 2020 16:46)  budesonide-formoterol 160 mcg-4.5 mcg/inh inhalation aerosol: 2 puff(s) inhaled 2 times a day (29 Nov 2020 16:46)  furosemide 40 mg oral tablet: 1 tab(s) orally once a day (29 Nov 2020 16:46)  lamoTRIgine 100 mg oral tablet, extended release: 1 tab(s) orally once a day (29 Nov 2020 16:46)  LORazepam 0.5 mg oral tablet: 1 tab(s) orally 2 times a day, As Needed (29 Nov 2020 16:46)  Metoprolol Tartrate 100 mg oral tablet: 1 tab(s) orally 2 times a day (29 Nov 2020 16:46)  montelukast 10 mg oral tablet: 1 tab(s) orally once a day (29 Nov 2020 16:46)  Pepcid 20 mg oral tablet: 1 tab(s) orally once a day (29 Nov 2020 16:46)  Plavix 75 mg oral tablet: 1 tab(s) orally once a day (29 Nov 2020 16:46)  QUEtiapine 200 mg oral tablet: 1 tab(s) orally once a day (at bedtime) (29 Nov 2020 16:46)  tiotropium 18 mcg inhalation capsule: 1 cap(s) inhaled once a day (02 Dec 2020 13:51)        24HRS EVENT:    Overnight:  Neuro Intact   Hemodynamically stable       DVT PTX: No HSQ as per Vascular Team     GI PTX: Famotidine       ***Tubes/Lines/Drains  ***  Peripheral IV  Urinary Catheter		      REVIEW OF SYSTEMS    [x] A ten-point review of systems was otherwise negative except as noted.  [ ] Due to altered mental status/intubation, subjective information were not able to be obtained from the patient. History was obtained, to the extent possible, from review of the chart and collateral sources of information.

## 2020-12-05 NOTE — PROGRESS NOTE ADULT - ATTENDING COMMENTS
I personally examined this patient with the PA and resident and discussed my plan with them.    pt is s/p tcar in setting of copd, hf and paroxysmal afib  overnight was pain controlled   in am pt had some worsening word finding  stroke code called, no initial read stroke  remains on barb for map goals, will ask vascular for map goals  will restart home psych meds after checking qt  copd on home o2 5l, on 2l now  on asa plavix

## 2020-12-05 NOTE — CONSULT NOTE ADULT - SUBJECTIVE AND OBJECTIVE BOX
Neurology Consult  RECENT EVENTS:  The patient is S/p Transcarotid revascularization. Patient complained of having difficulty finding words overnight, approximately 11 Pm. She was S/p local anesthesia with no motor or sensory deficits as reported by the primary team. Last dose of Dabigatran was dec 3 at 5 am. No complications have been reported after the procedure. This morning at 8:50 am Code stroke was called when the patient reported worsening difficulty of finding words.   Neurology team evaluated promptly and examined the patient. She is in mild distress because of pain in her right arm with mild shaking. She looks to have difficulty finding words however maintain appropriate conversation.        Patient is a 75y old  Female who presents with a chief complaint of Odynophagia associated with chest pain (05 Dec 2020 04:35)      HPI:  History of present illness goes back to 6AM on the morning of presentation when the patient first noticed chest pain that was associated with swallowing. She states that she was in her usual state of health the night prior. Of note, she lives with her daughter who is currently in their home quarantining, but the patient states her daughter is not doing a good job and was concerned that she might have COVID, so she went to urgent care and got tested, results pending. Her chest pain was described as severe when she swallowed, radiating to her neck and shoulder so she called EMS and was brought to the ED.        In the ED, her vitals were normal. EMS reported that in transit there was some anterior leads that showed TWI, but repeat EKG in the ED did not show any changes. CXR did however show a LLL opacity, increased from last month's CXR representing a possible developing pneumonia. She also wanted to come to the ED because she is planning to undergo a carotid endarterectomy on 12/15 and was concerned that uf she was ill, she would need to postpone the procedure. Rapid COVID was negative, PCR pending. Admitted by ED for ACS rule-out.       ROS: Denies headache, changes in vision, chest pain, palpitations, shortness of breath, cough, fever, chills, nausea, vomiting, diarrhea, and constipation.  (29 Nov 2020 16:06)      PAST MEDICAL & SURGICAL HISTORY:  Falls    Congestive heart failure    Transient ischemic attack    FLAVIO on CPAP    Bipolar 1 disorder    Allergic reaction    PVD (peripheral vascular disease)    Other emphysema    Other cardiomyopathy    TIA (transient ischemic attack)    COPD (chronic obstructive pulmonary disease)    Depression    Hypertension    History of cholecystectomy        FAMILY HISTORY:  No pertinent family history in first degree relatives        Social History: (-) x 3    Allergies    codeine (Other (Moderate))  Depakote (Unknown)  Dilaudid (Short breath; Rash)  IV Contrast (Anaphylaxis)  losartan (Angioedema)  Risperdal (Other)  verapamil (Short breath; Angioedema)    Intolerances        MEDICATIONS  (STANDING):  albuterol/ipratropium for Nebulization 3 milliLiter(s) Nebulizer every 4 hours  aspirin  chewable 81 milliGRAM(s) Oral daily  atorvastatin 20 milliGRAM(s) Oral at bedtime  budesonide 160 MICROgram(s)/formoterol 4.5 MICROgram(s) Inhaler 2 Puff(s) Inhalation two times a day  chlorhexidine 4% Liquid 1 Application(s) Topical <User Schedule>  clopidogrel Tablet 75 milliGRAM(s) Oral daily  diphenhydrAMINE IV Push - Peds 50 milliGRAM(s) IV Push once  famotidine    Tablet 20 milliGRAM(s) Oral daily  gabapentin 300 milliGRAM(s) Oral three times a day  hydrocortisone sodium succinate Injectable 200 milliGRAM(s) IV Push once  lactated ringers. 1000 milliLiter(s) (75 mL/Hr) IV Continuous <Continuous>  lamoTRIgine 100 milliGRAM(s) Oral daily  phenylephrine    Infusion 0.1 MICROgram(s)/kG/Min (3.18 mL/Hr) IV Continuous <Continuous>  tiotropium 18 MICROgram(s) Capsule 1 Capsule(s) Inhalation daily    MEDICATIONS  (PRN):  acetaminophen   Tablet .. 650 milliGRAM(s) Oral every 6 hours PRN Moderate Pain (4 - 6)  LORazepam     Tablet 0.5 milliGRAM(s) Oral every 6 hours PRN Anxiety      Vital Signs Last 24 Hrs  T(C): 36.1 (04 Dec 2020 23:30), Max: 36.6 (04 Dec 2020 10:45)  T(F): 97 (04 Dec 2020 23:30), Max: 97.9 (04 Dec 2020 10:45)  HR: 56 (05 Dec 2020 06:00) (53 - 79)  BP: 108/56 (04 Dec 2020 12:15) (88/45 - 151/65)  BP(mean): 75 (04 Dec 2020 12:15) (58 - 78)  RR: 18 (04 Dec 2020 19:00) (10 - 37)  SpO2: 97% (05 Dec 2020 06:00) (95% - 100%)    Examination:  General:  Appearance is consistent with chronologic age.  No abnormal facies.  Gross skin survey within normal limits.  AO X3  Eyes: EOMI w/o nystagmus PERRL.  No ptosis/weakness of eyelid closure.    Face:  Facial sensation normal V1 - 3, no facial asymmetry.    Ears/Nose/Throat:  Hearing grossly intact b/l.  Palate elevates midline.  Tongue and uvula midline.   Motor examination: 4/5 in upper and lower extremities, generalized weakness. Right upper arm with A-line. Mild shaking noted. No observable drift.  Reflexes: Plantar response downgoing b/l.   Sensory examination:   Intact to light touch and pinprick, pain, temperature and proprioception and vibration in all extremities.  Cerebellum: No dysmetria or dysdiadokinesia  Respiratory:  no audible wheezing or inspiratory stridor.  no use of accessory muscles.   Cardiac: pulse palpable, no audible bruits  Abdomen: supple, no guarding, no TTP    Labs:   CBC Full  -  ( 05 Dec 2020 06:10 )  WBC Count : 11.02 K/uL  RBC Count : 3.64 M/uL  Hemoglobin : 11.5 g/dL  Hematocrit : 35.3 %  Platelet Count - Automated : 259 K/uL  Mean Cell Volume : 97.0 fL  Mean Cell Hemoglobin : 31.6 pg  Mean Cell Hemoglobin Concentration : 32.6 g/dL  Auto Neutrophil # : 9.00 K/uL  Auto Lymphocyte # : 1.33 K/uL  Auto Monocyte # : 0.63 K/uL  Auto Eosinophil # : 0.00 K/uL  Auto Basophil # : 0.01 K/uL  Auto Neutrophil % : 81.6 %  Auto Lymphocyte % : 12.1 %  Auto Monocyte % : 5.7 %  Auto Eosinophil % : 0.0 %  Auto Basophil % : 0.1 %    12-05    140  |  108  |  22<H>  ----------------------------<  124<H>  4.3   |  21  |  0.8    Ca    9.2      05 Dec 2020 06:10  Phos  2.9     12-05  Mg     2.1     12-05    TPro  6.0  /  Alb  3.5  /  TBili  0.8  /  DBili  x   /  AST  31  /  ALT  23  /  AlkPhos  182<H>  12-05    LIVER FUNCTIONS - ( 05 Dec 2020 06:10 )  Alb: 3.5 g/dL / Pro: 6.0 g/dL / ALK PHOS: 182 U/L / ALT: 23 U/L / AST: 31 U/L / GGT: x           PT/INR - ( 04 Dec 2020 14:55 )   PT: 13.70 sec;   INR: 1.19 ratio         PTT - ( 04 Dec 2020 14:55 )  PTT:41.0 sec        Neuroimaging:  NCT:     12-05-20 @ 10:39       Neurology Consult  RECENT EVENTS:  76 yo LHF w/ h/o COPD. Depression/BPD, CHF, HTN, FLAVIO, PVD, TIA currently s/p L carotid revascularization w/ stenting currently found this morning with difficulty speaking with delayed speech.  Symptoms reported by patient last night with difficulty finding words overnight, approximately 11 Pm. She was S/p local anesthesia with no motor or sensory deficits as reported by the primary team. Last dose of Dabigatran was dec 3 at 5 am. No complications have been reported after the procedure. This morning at 8:50 am Code stroke was called when the patient reported worsening difficulty of finding words.   Neurology team evaluated promptly and examined the patient. She is in mild distress because of pain in her right arm with mild shaking. She looks to have difficulty finding words however maintain appropriate conversation with intact repetition and naming.  Speech delay appears more secondary to global bradyphrenia.  Pt with gross asterixis on exam.      ROS: Denies headache, changes in vision, chest pain, palpitations, shortness of breath, cough, fever, chills, nausea, vomiting, diarrhea, and constipation.  (29 Nov 2020 16:06)    PAST MEDICAL & SURGICAL HISTORY:  Falls    Congestive heart failure    Transient ischemic attack    FLAVIO on CPAP    Bipolar 1 disorder    Allergic reaction    PVD (peripheral vascular disease)    Other emphysema    Other cardiomyopathy    TIA (transient ischemic attack)    COPD (chronic obstructive pulmonary disease)    Depression    Hypertension    History of cholecystectomy    FAMILY HISTORY:  No pertinent family history in first degree relatives    Social History: (-) x 3    Allergies    codeine (Other (Moderate))  Depakote (Unknown)  Dilaudid (Short breath; Rash)  IV Contrast (Anaphylaxis)  losartan (Angioedema)  Risperdal (Other)  verapamil (Short breath; Angioedema)    Intolerances        MEDICATIONS  (STANDING):  albuterol/ipratropium for Nebulization 3 milliLiter(s) Nebulizer every 4 hours  aspirin  chewable 81 milliGRAM(s) Oral daily  atorvastatin 20 milliGRAM(s) Oral at bedtime  budesonide 160 MICROgram(s)/formoterol 4.5 MICROgram(s) Inhaler 2 Puff(s) Inhalation two times a day  chlorhexidine 4% Liquid 1 Application(s) Topical <User Schedule>  clopidogrel Tablet 75 milliGRAM(s) Oral daily  diphenhydrAMINE IV Push - Peds 50 milliGRAM(s) IV Push once  famotidine    Tablet 20 milliGRAM(s) Oral daily  gabapentin 300 milliGRAM(s) Oral three times a day  hydrocortisone sodium succinate Injectable 200 milliGRAM(s) IV Push once  lactated ringers. 1000 milliLiter(s) (75 mL/Hr) IV Continuous <Continuous>  lamoTRIgine 100 milliGRAM(s) Oral daily  phenylephrine    Infusion 0.1 MICROgram(s)/kG/Min (3.18 mL/Hr) IV Continuous <Continuous>  tiotropium 18 MICROgram(s) Capsule 1 Capsule(s) Inhalation daily    MEDICATIONS  (PRN):  acetaminophen   Tablet .. 650 milliGRAM(s) Oral every 6 hours PRN Moderate Pain (4 - 6)  LORazepam     Tablet 0.5 milliGRAM(s) Oral every 6 hours PRN Anxiety      Vital Signs Last 24 Hrs  T(C): 36.1 (04 Dec 2020 23:30), Max: 36.6 (04 Dec 2020 10:45)  T(F): 97 (04 Dec 2020 23:30), Max: 97.9 (04 Dec 2020 10:45)  HR: 56 (05 Dec 2020 06:00) (53 - 79)  BP: 108/56 (04 Dec 2020 12:15) (88/45 - 151/65)  BP(mean): 75 (04 Dec 2020 12:15) (58 - 78)  RR: 18 (04 Dec 2020 19:00) (10 - 37)  SpO2: 97% (05 Dec 2020 06:00) (95% - 100%)    Examination:  General:  Appearance is consistent with chronologic age.  No abnormal facies.  Gross skin survey within normal limits.  AO X3 w/ normal naming and repetition with fluent speech but hesitant and bradyphrenic.  Eyes: EOMI w/o nystagmus PERRL.  No ptosis/weakness of eyelid closure.    Face:  Facial sensation normal V1 - 3, no facial asymmetry.    Ears/Nose/Throat:  Hearing grossly intact b/l.  Palate elevates midline.  Tongue and uvula midline.   Motor examination: 4/5 in upper and lower extremities, generalized weakness. Right upper arm with A-line. Mild shaking noted. No observable drift.  Reflexes: Plantar response downgoing b/l.   Sensory examination:   Intact to light touch and pinprick, pain, temperature and proprioception and vibration in all extremities.  Cerebellum: No dysmetria or dysdiadokinesia  Respiratory:  no audible wheezing or inspiratory stridor.  no use of accessory muscles.   Cardiac: pulse palpable, no audible bruits  Abdomen: supple, no guarding, no TTP  Asterixis b/l with increased tremulousness RUE.      NIHSS 0    Labs:   CBC Full  -  ( 05 Dec 2020 06:10 )  WBC Count : 11.02 K/uL  RBC Count : 3.64 M/uL  Hemoglobin : 11.5 g/dL  Hematocrit : 35.3 %  Platelet Count - Automated : 259 K/uL  Mean Cell Volume : 97.0 fL  Mean Cell Hemoglobin : 31.6 pg  Mean Cell Hemoglobin Concentration : 32.6 g/dL  Auto Neutrophil # : 9.00 K/uL  Auto Lymphocyte # : 1.33 K/uL  Auto Monocyte # : 0.63 K/uL  Auto Eosinophil # : 0.00 K/uL  Auto Basophil # : 0.01 K/uL  Auto Neutrophil % : 81.6 %  Auto Lymphocyte % : 12.1 %  Auto Monocyte % : 5.7 %  Auto Eosinophil % : 0.0 %  Auto Basophil % : 0.1 %    12-05    140  |  108  |  22<H>  ----------------------------<  124<H>  4.3   |  21  |  0.8    Ca    9.2      05 Dec 2020 06:10  Phos  2.9     12-05  Mg     2.1     12-05    TPro  6.0  /  Alb  3.5  /  TBili  0.8  /  DBili  x   /  AST  31  /  ALT  23  /  AlkPhos  182<H>  12-05    LIVER FUNCTIONS - ( 05 Dec 2020 06:10 )  Alb: 3.5 g/dL / Pro: 6.0 g/dL / ALK PHOS: 182 U/L / ALT: 23 U/L / AST: 31 U/L / GGT: x           PT/INR - ( 04 Dec 2020 14:55 )   PT: 13.70 sec;   INR: 1.19 ratio       PTT - ( 04 Dec 2020 14:55 )  PTT:41.0 sec    < from: CT Brain Stroke Protocol (12.05.20 @ 09:38) >  Impression:    No evidence of acute intracranial abnormality.    Stable mild chronic microvascular ischemic changes.    Dr. Munoz spoke with Dr. BURNS on 12/5/2020 9:39 AM with readback.    CHIARA SPENCER M.D., ATTENDING RADIOLOGIST  This document has been electronically signed. Dec  5 2020 10:04AM    < end of copied text >    < from: CT Perfusion w/ Maps w/ IV Cont (12.05.20 @ 10:25) >  IMPRESSION:    CT PERFUSION:  Normal perfusion images of the brain.    CTA head/neck:  Interval left carotid artery stent placement extending from the carotid bulb to the proximal ICA with intact intraluminal flow and expected postoperative changes. Mild narrowing within the stent measuring 0.3 cm.    No evidence of large vessel occlusion or aneurysm.    Redemonstration of left parotid mass.    Severe extensive emphysematous changes of the lungs.    SHILPA SAGASTUME M.D., RESIDENT RADIOLOGIST  This document has been electronically signed.  CHIARA SPENCER M.D., ATTENDING RADIOLOGIST  This document has been electronically signed. Dec  5 2020 11:07AM    < end of copied text >

## 2020-12-05 NOTE — CHART NOTE - NSCHARTNOTEFT_GEN_A_CORE
POD 1 s/p left TCAR for high grade carotid stenosis on local with sedation  Around 11pm examined patient at bedside c/o difficulty remembering stuff and word finding.  On my exam patient had no focal deficits and only complaint was some mild word findings difficulty - had a 20 minute conversation with her and symptoms were not pronounced.  This morning she c/o worsening difficulty of findings works and right hand shakiness.  Code stroke called. Neurology team involved.  Will obtain CT brain/CT perfusion and duplex US neck

## 2020-12-05 NOTE — CONSULT NOTE ADULT - ATTENDING COMMENTS
I discussed this patient with the PA and resident over the phone and made all critical care decisions.  The next day I performed my own exam and confirmed the above findings.    post-op tcar with barb for sbp goals  on o2 in setting of home o2  pain control  risk of cv complications, monitored setting overnight
Seen and examined with the pulmonary fellow at the bed side.  Impression and plan as outlined above.
Patient seen and examined at bedside. Left kate-parotid palpable mass.   Endoscopic exam shows no other abnormality within limits of exam.    Recommend MRI of the neck.    Will follow.
known to me with carotid stenosis     will consider intervention on this admission if stable.
I have personally seen and examined this patient.  I have fully participated in the care of this patient.  I have reviewed all pertinent clinical information, including history, physical exam, plan and note.   I have reviewed all pertinent clinical information and reviewed all relevant imaging and diagnostic studies personally.  Recommendations as above.  Agree with above assessment except as noted.

## 2020-12-05 NOTE — PROGRESS NOTE ADULT - ASSESSMENT
76 y/o female s/p left carotid artery stenting. no neuro deficits post op    plan:  continue to monitor BP  q1 hour neuro checks  DVT/GI prophylaxis  incentive spirometry

## 2020-12-05 NOTE — PROGRESS NOTE ADULT - ASSESSMENT
Assessment & Plan    75y Female HD#5 POD#0 s/p left TCAR, right groin access 2/2 >80% stenosis of left ICA, recent TIA    NEURO:    Acute pain-controlled with  Tylenol, avoid narcotics    s/p carotid-q1 neurochecks    hx of TIA-on ASA    hx of bipolar disorder-holding lorazepam, seroquel 2/2 q1 neurochecks    RESP:     Oxygen insufficiency-wean to 5L nc (on home 5L oxygen)    hx of COPD-restarted montelukast, tiotropium    hx of smoking (quit 10+yrs)-encourage IS    Pulm c/s-recs triple inhaler therapy, keep sat >92%    Activity-bedrest      CARDS:     SBP goal 140-started on barb currently at 0.3mcg/kg/hr    hx of paroxAfib-on pradaxa, held 2/2 to OR, f/u with vasc cardiology to restart (Dr. Carney)    hx of hld-restarted atorvastatin    Imaging: post op EKG NSR    Labs: CE x3 negative     GI/NUTR:     Diet-Regular    GI Prophylaxis-Pepcid (home rx)    /RENAL:     Monitor UO-no stephens in place, if no void by 6pm, bladder scan    BUN/Cr- 22/0.9    HEME/ONC:     DVT prophylaxis-holding dvt prophylaxis    Hb/Hct: 11.7    ID:    no post op abx as per vascular fellow, d/w Dr. Esparza    ENDO:    Glucose Glucose, Serum: 89 (12-04 @ 05:31)    LINES/DRAINS:  [place] NUÑEZ    DISPO:    SICU d/w Dr. Galvin

## 2020-12-06 LAB
AMMONIA BLD-MCNC: 29 UMOL/L — SIGNIFICANT CHANGE UP (ref 11–55)
ANION GAP SERPL CALC-SCNC: 9 MMOL/L — SIGNIFICANT CHANGE UP (ref 7–14)
ANION GAP SERPL CALC-SCNC: 9 MMOL/L — SIGNIFICANT CHANGE UP (ref 7–14)
APTT BLD: 32.4 SEC — SIGNIFICANT CHANGE UP (ref 27–39.2)
APTT BLD: 36.4 SEC — SIGNIFICANT CHANGE UP (ref 27–39.2)
BASOPHILS # BLD AUTO: 0.03 K/UL — SIGNIFICANT CHANGE UP (ref 0–0.2)
BASOPHILS NFR BLD AUTO: 0.5 % — SIGNIFICANT CHANGE UP (ref 0–1)
BUN SERPL-MCNC: 22 MG/DL — HIGH (ref 10–20)
BUN SERPL-MCNC: 22 MG/DL — HIGH (ref 10–20)
CALCIUM SERPL-MCNC: 8.2 MG/DL — LOW (ref 8.5–10.1)
CALCIUM SERPL-MCNC: 9.2 MG/DL — SIGNIFICANT CHANGE UP (ref 8.5–10.1)
CHLORIDE SERPL-SCNC: 108 MMOL/L — SIGNIFICANT CHANGE UP (ref 98–110)
CHLORIDE SERPL-SCNC: 109 MMOL/L — SIGNIFICANT CHANGE UP (ref 98–110)
CK MB CFR SERPL CALC: 1.7 NG/ML — SIGNIFICANT CHANGE UP (ref 0.6–6.3)
CK SERPL-CCNC: 36 U/L — SIGNIFICANT CHANGE UP (ref 0–225)
CO2 SERPL-SCNC: 22 MMOL/L — SIGNIFICANT CHANGE UP (ref 17–32)
CO2 SERPL-SCNC: 23 MMOL/L — SIGNIFICANT CHANGE UP (ref 17–32)
CREAT SERPL-MCNC: 0.8 MG/DL — SIGNIFICANT CHANGE UP (ref 0.7–1.5)
CREAT SERPL-MCNC: 0.9 MG/DL — SIGNIFICANT CHANGE UP (ref 0.7–1.5)
EOSINOPHIL # BLD AUTO: 0.16 K/UL — SIGNIFICANT CHANGE UP (ref 0–0.7)
EOSINOPHIL NFR BLD AUTO: 2.8 % — SIGNIFICANT CHANGE UP (ref 0–8)
GLUCOSE SERPL-MCNC: 102 MG/DL — HIGH (ref 70–99)
GLUCOSE SERPL-MCNC: 124 MG/DL — HIGH (ref 70–99)
HCT VFR BLD CALC: 32.3 % — LOW (ref 37–47)
HCT VFR BLD CALC: 33.1 % — LOW (ref 37–47)
HGB BLD-MCNC: 10.1 G/DL — LOW (ref 12–16)
HGB BLD-MCNC: 10.7 G/DL — LOW (ref 12–16)
IMM GRANULOCYTES NFR BLD AUTO: 0.2 % — SIGNIFICANT CHANGE UP (ref 0.1–0.3)
INR BLD: 1.26 RATIO — SIGNIFICANT CHANGE UP (ref 0.65–1.3)
INR BLD: 1.26 RATIO — SIGNIFICANT CHANGE UP (ref 0.65–1.3)
LYMPHOCYTES # BLD AUTO: 1.34 K/UL — SIGNIFICANT CHANGE UP (ref 1.2–3.4)
LYMPHOCYTES # BLD AUTO: 23.3 % — SIGNIFICANT CHANGE UP (ref 20.5–51.1)
MAGNESIUM SERPL-MCNC: 2 MG/DL — SIGNIFICANT CHANGE UP (ref 1.8–2.4)
MAGNESIUM SERPL-MCNC: 2.2 MG/DL — SIGNIFICANT CHANGE UP (ref 1.8–2.4)
MCHC RBC-ENTMCNC: 31.3 G/DL — LOW (ref 32–37)
MCHC RBC-ENTMCNC: 31.3 PG — HIGH (ref 27–31)
MCHC RBC-ENTMCNC: 31.4 PG — HIGH (ref 27–31)
MCHC RBC-ENTMCNC: 32.3 G/DL — SIGNIFICANT CHANGE UP (ref 32–37)
MCV RBC AUTO: 100 FL — HIGH (ref 81–99)
MCV RBC AUTO: 97.1 FL — SIGNIFICANT CHANGE UP (ref 81–99)
MONOCYTES # BLD AUTO: 0.36 K/UL — SIGNIFICANT CHANGE UP (ref 0.1–0.6)
MONOCYTES NFR BLD AUTO: 6.3 % — SIGNIFICANT CHANGE UP (ref 1.7–9.3)
NEUTROPHILS # BLD AUTO: 3.84 K/UL — SIGNIFICANT CHANGE UP (ref 1.4–6.5)
NEUTROPHILS NFR BLD AUTO: 66.9 % — SIGNIFICANT CHANGE UP (ref 42.2–75.2)
NRBC # BLD: 0 /100 WBCS — SIGNIFICANT CHANGE UP (ref 0–0)
NRBC # BLD: 0 /100 WBCS — SIGNIFICANT CHANGE UP (ref 0–0)
PHOSPHATE SERPL-MCNC: 2.6 MG/DL — SIGNIFICANT CHANGE UP (ref 2.1–4.9)
PHOSPHATE SERPL-MCNC: 2.9 MG/DL — SIGNIFICANT CHANGE UP (ref 2.1–4.9)
PLATELET # BLD AUTO: 172 K/UL — SIGNIFICANT CHANGE UP (ref 130–400)
PLATELET # BLD AUTO: 216 K/UL — SIGNIFICANT CHANGE UP (ref 130–400)
POTASSIUM SERPL-MCNC: 3.9 MMOL/L — SIGNIFICANT CHANGE UP (ref 3.5–5)
POTASSIUM SERPL-MCNC: 4.1 MMOL/L — SIGNIFICANT CHANGE UP (ref 3.5–5)
POTASSIUM SERPL-SCNC: 3.9 MMOL/L — SIGNIFICANT CHANGE UP (ref 3.5–5)
POTASSIUM SERPL-SCNC: 4.1 MMOL/L — SIGNIFICANT CHANGE UP (ref 3.5–5)
PROTHROM AB SERPL-ACNC: 14.5 SEC — HIGH (ref 9.95–12.87)
PROTHROM AB SERPL-ACNC: 14.5 SEC — HIGH (ref 9.95–12.87)
RBC # BLD: 3.23 M/UL — LOW (ref 4.2–5.4)
RBC # BLD: 3.41 M/UL — LOW (ref 4.2–5.4)
RBC # FLD: 13.7 % — SIGNIFICANT CHANGE UP (ref 11.5–14.5)
RBC # FLD: 13.8 % — SIGNIFICANT CHANGE UP (ref 11.5–14.5)
SODIUM SERPL-SCNC: 140 MMOL/L — SIGNIFICANT CHANGE UP (ref 135–146)
SODIUM SERPL-SCNC: 140 MMOL/L — SIGNIFICANT CHANGE UP (ref 135–146)
TROPONIN T SERPL-MCNC: <0.01 NG/ML — SIGNIFICANT CHANGE UP
WBC # BLD: 5.74 K/UL — SIGNIFICANT CHANGE UP (ref 4.8–10.8)
WBC # BLD: 9.05 K/UL — SIGNIFICANT CHANGE UP (ref 4.8–10.8)
WBC # FLD AUTO: 5.74 K/UL — SIGNIFICANT CHANGE UP (ref 4.8–10.8)
WBC # FLD AUTO: 9.05 K/UL — SIGNIFICANT CHANGE UP (ref 4.8–10.8)

## 2020-12-06 PROCEDURE — 93970 EXTREMITY STUDY: CPT | Mod: 26

## 2020-12-06 PROCEDURE — 93010 ELECTROCARDIOGRAM REPORT: CPT

## 2020-12-06 PROCEDURE — 70551 MRI BRAIN STEM W/O DYE: CPT | Mod: 26

## 2020-12-06 PROCEDURE — 71045 X-RAY EXAM CHEST 1 VIEW: CPT | Mod: 26

## 2020-12-06 PROCEDURE — 99233 SBSQ HOSP IP/OBS HIGH 50: CPT

## 2020-12-06 RX ORDER — SODIUM CHLORIDE 9 MG/ML
500 INJECTION, SOLUTION INTRAVENOUS ONCE
Refills: 0 | Status: COMPLETED | OUTPATIENT
Start: 2020-12-06 | End: 2020-12-07

## 2020-12-06 RX ORDER — SODIUM CHLORIDE 9 MG/ML
500 INJECTION, SOLUTION INTRAVENOUS ONCE
Refills: 0 | Status: DISCONTINUED | OUTPATIENT
Start: 2020-12-06 | End: 2020-12-06

## 2020-12-06 RX ORDER — SODIUM CHLORIDE 9 MG/ML
250 INJECTION, SOLUTION INTRAVENOUS ONCE
Refills: 0 | Status: COMPLETED | OUTPATIENT
Start: 2020-12-06 | End: 2020-12-06

## 2020-12-06 RX ORDER — POTASSIUM PHOSPHATE, MONOBASIC POTASSIUM PHOSPHATE, DIBASIC 236; 224 MG/ML; MG/ML
15 INJECTION, SOLUTION INTRAVENOUS ONCE
Refills: 0 | Status: COMPLETED | OUTPATIENT
Start: 2020-12-06 | End: 2020-12-06

## 2020-12-06 RX ADMIN — DABIGATRAN ETEXILATE MESYLATE 150 MILLIGRAM(S): 150 CAPSULE ORAL at 05:24

## 2020-12-06 RX ADMIN — ATORVASTATIN CALCIUM 20 MILLIGRAM(S): 80 TABLET, FILM COATED ORAL at 22:51

## 2020-12-06 RX ADMIN — Medication 0.5 MILLIGRAM(S): at 21:12

## 2020-12-06 RX ADMIN — FAMOTIDINE 20 MILLIGRAM(S): 10 INJECTION INTRAVENOUS at 12:04

## 2020-12-06 RX ADMIN — DABIGATRAN ETEXILATE MESYLATE 150 MILLIGRAM(S): 150 CAPSULE ORAL at 17:14

## 2020-12-06 RX ADMIN — LAMOTRIGINE 100 MILLIGRAM(S): 25 TABLET, ORALLY DISINTEGRATING ORAL at 12:03

## 2020-12-06 RX ADMIN — GABAPENTIN 300 MILLIGRAM(S): 400 CAPSULE ORAL at 14:09

## 2020-12-06 RX ADMIN — CHLORHEXIDINE GLUCONATE 1 APPLICATION(S): 213 SOLUTION TOPICAL at 02:00

## 2020-12-06 RX ADMIN — GABAPENTIN 300 MILLIGRAM(S): 400 CAPSULE ORAL at 22:51

## 2020-12-06 RX ADMIN — BUDESONIDE AND FORMOTEROL FUMARATE DIHYDRATE 2 PUFF(S): 160; 4.5 AEROSOL RESPIRATORY (INHALATION) at 20:30

## 2020-12-06 RX ADMIN — Medication 81 MILLIGRAM(S): at 12:04

## 2020-12-06 RX ADMIN — BUDESONIDE AND FORMOTEROL FUMARATE DIHYDRATE 2 PUFF(S): 160; 4.5 AEROSOL RESPIRATORY (INHALATION) at 09:08

## 2020-12-06 RX ADMIN — Medication 3 MILLILITER(S): at 21:40

## 2020-12-06 RX ADMIN — SODIUM CHLORIDE 1500 MILLILITER(S): 9 INJECTION, SOLUTION INTRAVENOUS at 14:11

## 2020-12-06 RX ADMIN — POTASSIUM PHOSPHATE, MONOBASIC POTASSIUM PHOSPHATE, DIBASIC 62.5 MILLIMOLE(S): 236; 224 INJECTION, SOLUTION INTRAVENOUS at 03:02

## 2020-12-06 RX ADMIN — Medication 0.5 MILLIGRAM(S): at 14:12

## 2020-12-06 RX ADMIN — Medication 650 MILLIGRAM(S): at 21:12

## 2020-12-06 RX ADMIN — CLOPIDOGREL BISULFATE 75 MILLIGRAM(S): 75 TABLET, FILM COATED ORAL at 12:04

## 2020-12-06 RX ADMIN — GABAPENTIN 300 MILLIGRAM(S): 400 CAPSULE ORAL at 05:24

## 2020-12-06 RX ADMIN — Medication 650 MILLIGRAM(S): at 12:00

## 2020-12-06 RX ADMIN — Medication 650 MILLIGRAM(S): at 09:27

## 2020-12-06 NOTE — DIETITIAN INITIAL EVALUATION ADULT. - ADD RECOMMEND
Rec: (1) Please add GLUCERNA SHAKE q12hr to current Carbohydrate Consistent w/ snack diet. HgbA1c 5.6

## 2020-12-06 NOTE — PROGRESS NOTE ADULT - SUBJECTIVE AND OBJECTIVE BOX
SHAUN COATES  [id number]  75y Female    Indication for ICU admission: L TCAR , POD # 1 stroke code    Admit Date:11-29-20  ICU Date: 12/4  OR Date: 12/4     codeine (Other (Moderate))  Depakote (Unknown)  Dilaudid (Short breath; Rash)  IV Contrast (Anaphylaxis)  losartan (Angioedema)  Risperdal (Other)  verapamil (Short breath; Angioedema)    PAST MEDICAL & SURGICAL HISTORY:  Falls    Congestive heart failure    Transient ischemic attack    FLAVIO on CPAP    Bipolar 1 disorder    Allergic reaction    PVD (peripheral vascular disease)    Other emphysema    Other cardiomyopathy    TIA (transient ischemic attack)    COPD (chronic obstructive pulmonary disease)    Depression    Hypertension    History of cholecystectomy      Home Medications:  amLODIPine 2.5 mg oral tablet: 1 tab(s) orally once a day (29 Nov 2020 16:46)  aspirin 81 mg oral tablet: 1 tab(s) orally once a day (29 Nov 2020 16:46)  atorvastatin 20 mg oral tablet: 1 tab(s) orally once a day (29 Nov 2020 16:46)  budesonide-formoterol 160 mcg-4.5 mcg/inh inhalation aerosol: 2 puff(s) inhaled 2 times a day (29 Nov 2020 16:46)  furosemide 40 mg oral tablet: 1 tab(s) orally once a day (29 Nov 2020 16:46)  lamoTRIgine 100 mg oral tablet, extended release: 1 tab(s) orally once a day (29 Nov 2020 16:46)  LORazepam 0.5 mg oral tablet: 1 tab(s) orally 2 times a day, As Needed (29 Nov 2020 16:46)  Metoprolol Tartrate 100 mg oral tablet: 1 tab(s) orally 2 times a day (29 Nov 2020 16:46)  montelukast 10 mg oral tablet: 1 tab(s) orally once a day (29 Nov 2020 16:46)  Pepcid 20 mg oral tablet: 1 tab(s) orally once a day (29 Nov 2020 16:46)  Plavix 75 mg oral tablet: 1 tab(s) orally once a day (29 Nov 2020 16:46)  QUEtiapine 200 mg oral tablet: 1 tab(s) orally once a day (at bedtime) (29 Nov 2020 16:46)  tiotropium 18 mcg inhalation capsule: 1 cap(s) inhaled once a day (02 Dec 2020 13:51)        24HRS EVENT:  ON:  - awake , aox2, follows commands, did not appreciate aphasia  - left LE noted to be red/slightly swollen and painful to touch- pulses intact, DVT sono ordered  - bebeto at .85   - voiding, afebrile , systolics   - Nh3 29, TSH pending     DVT PTX: ASA/Plavix, Pradaxa    GI PTX: Famotidine       ***Tubes/Lines/Drains  ***  Peripheral IV  Urinary Catheter		      REVIEW OF SYSTEMS    [x] A ten-point review of systems was otherwise negative except as noted.  [ ] Due to altered mental status/intubation, subjective information were not able to be obtained from the patient. History was obtained, to the extent possible, from review of the chart and collateral sources of information.      Assessment & Plan    75y Female HD#5 POD#0 s/p left TCAR, right groin access 2/2 >80% stenosis of left ICA, recent TIA    NEURO:    Acute pain-controlled with  Tylenol, avoid narcotics    s/p Left Tcar -q1 neurochecks    hx of TIA-on ASA  -stroke code - patient difficult word finding  -CT hd: No evidence of acute intracranial abnormality  -CT: Normal perfusion images of the brain.  -MRI brain pending   -keep SBP ~ 140 on bebeto   -started on ASA/plavix/pradaxa  --EEG pending  -TSH, ammonia  -h/o Bipolar - restarted all psych meds, lamictal, seroquel, ativan prn    RESP:     Oxygen insufficiency-wean to 5L nc (on home 5L oxygen)    hx of COPD-restarted montelukast, tiotropium    hx of smoking (quit 10+yrs)-encourage IS    Pulm c/s-recs triple inhaler therapy, keep sat >92%    Activity-bedrest      CARDS:     SBP continue to keep goal 140, on Bebeto gtt    hx of paroxAfib -on pradaxa, restrted     hx of hld-restarted atorvastatin    Imaging: post op EKG NSR    Labs: CE x3 negative     GI/NUTR:     Diet-Regular    GI Prophylaxis-Pepcid (home rx)    /RENAL:     Monitor UO, voiding without difficulty    BUN/Cr- 22/0.9    HEME/ONC:     DVT prophylaxis- Pradaxa, ASA/Plavix    Hb/Hct: 11.5    ID:    no post op abx as per vascular fellow, d/w Dr. Esparza    ENDO:    FS QAC and QHS  RISS    LINES/DRAINS:  [place] NUÑEZ    DISPO:    SICU              SHAUN COATES  [id number]  75y Female    Indication for ICU admission: L TCAR , POD # 1 stroke code    Admit Date:20  ICU Date:   OR Date:      codeine (Other (Moderate))  Depakote (Unknown)  Dilaudid (Short breath; Rash)  IV Contrast (Anaphylaxis)  losartan (Angioedema)  Risperdal (Other)  verapamil (Short breath; Angioedema)    PAST MEDICAL & SURGICAL HISTORY:  Falls    Congestive heart failure    Transient ischemic attack    FLAVIO on CPAP    Bipolar 1 disorder    Allergic reaction    PVD (peripheral vascular disease)    Other emphysema    Other cardiomyopathy    TIA (transient ischemic attack)    COPD (chronic obstructive pulmonary disease)    Depression    Hypertension    History of cholecystectomy      Home Medications:  amLODIPine 2.5 mg oral tablet: 1 tab(s) orally once a day (2020 16:46)  aspirin 81 mg oral tablet: 1 tab(s) orally once a day (2020 16:46)  atorvastatin 20 mg oral tablet: 1 tab(s) orally once a day (2020 16:46)  budesonide-formoterol 160 mcg-4.5 mcg/inh inhalation aerosol: 2 puff(s) inhaled 2 times a day (2020 16:46)  furosemide 40 mg oral tablet: 1 tab(s) orally once a day (2020 16:46)  lamoTRIgine 100 mg oral tablet, extended release: 1 tab(s) orally once a day (2020 16:46)  LORazepam 0.5 mg oral tablet: 1 tab(s) orally 2 times a day, As Needed (2020 16:46)  Metoprolol Tartrate 100 mg oral tablet: 1 tab(s) orally 2 times a day (2020 16:46)  montelukast 10 mg oral tablet: 1 tab(s) orally once a day (2020 16:46)  Pepcid 20 mg oral tablet: 1 tab(s) orally once a day (2020 16:46)  Plavix 75 mg oral tablet: 1 tab(s) orally once a day (2020 16:46)  QUEtiapine 200 mg oral tablet: 1 tab(s) orally once a day (at bedtime) (2020 16:46)  tiotropium 18 mcg inhalation capsule: 1 cap(s) inhaled once a day (02 Dec 2020 13:51)        24HRS EVENT:  ON:  - awake , aox2, follows commands, did not appreciate aphasia  - left LE noted to be red/slightly swollen and painful to touch- pulses intact, DVT sono ordered  - barb at .85   - voiding, afebrile , systolics   - Nh3 29, TSH pending     DVT PTX: ASA/Plavix, Pradaxa    GI PTX: Famotidine       ***Tubes/Lines/Drains  ***  Peripheral IV  Urinary Catheter		      REVIEW OF SYSTEMS    [x] A ten-point review of systems was otherwise negative except as noted.  [ ] Due to altered mental status/intubation, subjective information were not able to be obtained from the patient. History was obtained, to the extent possible, from review of the chart and collateral sources of information.      Assessment & Plan    75y Female HD#5 POD#0 s/p left TCAR, right groin access 2/2 >80% stenosis of left ICA, recent TIA    NEURO:    Acute pain-controlled with  Tylenol, avoid narcotics    s/p Left Tcar -q1 neurochecks    hx of TIA-on ASA  -stroke code - patient difficult word finding  -CT hd: No evidence of acute intracranial abnormality  -CT: Normal perfusion images of the brain.  -MRI brain pending   -keep SBP ~ 140 on barb   -started on ASA/plavix/pradaxa  --EEG pending  -TSH, ammonia  -h/o Bipolar - restarted all psych meds, lamictal, seroquel, ativan prn    RESP:     Oxygen insufficiency-wean to 5L nc (on home 5L oxygen)    hx of COPD-restarted montelukast, tiotropium    hx of smoking (quit 10+yrs)-encourage IS    Pulm c/s-recs triple inhaler therapy, keep sat >92%    Activity-bedrest      CARDS:     SBP continue to keep goal 140, on Barb gtt    hx of paroxAfib -on pradaxa, restrted     hx of hld-restarted atorvastatin    Imaging: post op EKG NSR    Labs: CE x3 negative     GI/NUTR:     Diet-Regular    GI Prophylaxis-Pepcid (home rx)    /RENAL:     Monitor UO, voiding without difficulty    BUN/Cr- 22/0.9    HEME/ONC:     DVT prophylaxis- Pradaxa, ASA/Plavix    Hb/Hct: 11.5    ID:    no post op abx as per vascular fellow, d/w Dr. Esparza    ENDO:    FS QAC and [facility]  RISS    LINES/DRAINS:  PIV, [place]    DISPO:    SICU       Daily Height in cm: 157.5 (06 Dec 2020 10:03)    Daily Weight in k.7 (06 Dec 2020 05:00)    CURRENT MEDS:  Neurologic Medications  acetaminophen   Tablet .. 650 milliGRAM(s) Oral every 6 hours PRN Moderate Pain (4 - 6)  gabapentin 300 milliGRAM(s) Oral three times a day  lamoTRIgine 100 milliGRAM(s) Oral daily  LORazepam     Tablet 0.5 milliGRAM(s) Oral every 6 hours PRN Anxiety  QUEtiapine 200 milliGRAM(s) Oral at bedtime    Respiratory Medications  albuterol/ipratropium for Nebulization 3 milliLiter(s) Nebulizer every 4 hours  budesonide 160 MICROgram(s)/formoterol 4.5 MICROgram(s) Inhaler 2 Puff(s) Inhalation two times a day  tiotropium 18 MICROgram(s) Capsule 1 Capsule(s) Inhalation daily    Cardiovascular Medications  phenylephrine    Infusion 0.1 MICROgram(s)/kG/Min IV Continuous <Continuous>    Gastrointestinal Medications  famotidine    Tablet 20 milliGRAM(s) Oral daily    Genitourinary Medications    Hematologic/Oncologic Medications  aspirin  chewable 81 milliGRAM(s) Oral daily  clopidogrel Tablet 75 milliGRAM(s) Oral daily  dabigatran 150 milliGRAM(s) Oral every 12 hours    Antimicrobial/Immunologic Medications    Endocrine/Metabolic Medications  atorvastatin 20 milliGRAM(s) Oral at bedtime    Topical/Other Medications  chlorhexidine 4% Liquid 1 Application(s) Topical <User Schedule>      ICU Vital Signs Last 24 Hrs  T(C): 36.3 (06 Dec 2020 08:01), Max: 36.3 (06 Dec 2020 04:00)  T(F): 97.4 (06 Dec 2020 08:01), Max: 97.4 (06 Dec 2020 08:01)  HR: 79 (06 Dec 2020 10:00) (53 - 82)  BP: 103/52 (06 Dec 2020 10:00) (83/44 - 159/67)  BP(mean): 73 (06 Dec 2020 10:00) (60 - 104)  ABP: 124/61 (06 Dec 2020 10:00) (97/47 - 176/96)  ABP(mean): 77 (06 Dec 2020 10:00) (63 - 116)  RR: 18 (06 Dec 2020 08:01) (16 - 24)  SpO2: 99% (06 Dec 2020 10:00) (93% - 100%)      I&O's Detail    05 Dec 2020 07:  -  06 Dec 2020 07:00  --------------------------------------------------------  IN:    IV PiggyBack: 125 mL    Lactated Ringers: 1125 mL    Oral Fluid: 120 mL    Phenylephrine: 252.4 mL  Total IN: 1622.4 mL    OUT:    Voided (mL): 2750 mL  Total OUT: 2750 mL    Total NET: -1127.6 mL      06 Dec 2020 07:  -  06 Dec 2020 10:51  --------------------------------------------------------  IN:    Oral Fluid: 120 mL    Phenylephrine: 63.6 mL  Total IN: 183.6 mL    OUT:  Total OUT: 0 mL    Total NET: 183.6 mL        I&O's Summary    05 Dec 2020 07:  -  06 Dec 2020 07:00  --------------------------------------------------------  IN: 1622.4 mL / OUT: 2750 mL / NET: -1127.6 mL    06 Dec 2020 07:  -  06 Dec 2020 10:51  --------------------------------------------------------  IN: 183.6 mL / OUT: 0 mL / NET: 183.6 mL      LABS:                            10.7   9.05  )-----------( 216      ( 05 Dec 2020 23:30 )             33.1         12-05    140  |  108  |  22<H>  ----------------------------<  124<H>  3.9   |  23  |  0.8      Calcium, Total Serum: 9.2 mg/dL (20 @ 23:30)      LFTs:             6.0  | 0.8  | 31       ------------------[182     ( 05 Dec 2020 06:10 )  3.5  | x    | 23            Coags:     14.50  ----< 1.26    ( 05 Dec 2020 23:30 )     32.4        CARDIAC MARKERS ( 05 Dec 2020 06:10 )  x     / <0.01 ng/mL / x     / x     / x      CARDIAC MARKERS ( 05 Dec 2020 02:30 )  x     / <0.01 ng/mL / x     / x     / x      CARDIAC MARKERS ( 04 Dec 2020 16:30 )  x     / <0.01 ng/mL / x     / x     / x      CARDIAC MARKERS ( 04 Dec 2020 12:22 )  x     / x     / 24 U/L / x     / x      CARDIAC MARKERS ( 04 Dec 2020 11:22 )  x     / <0.01 ng/mL / x     / x     / 1.4 ng/mL        PHYSICAL EXAM:    General/Neuro       GCS:  15     Deficits:  alert & oriented x 3, no focal deficits, BRADY, good motor strength b/l   Pupils: reactive b/l  Lungs:  clear to auscultation, Normal expansion/effort.   Cardiovascular : S1, S2.  Regular rate and rhythm.    Cardiac Rhythm: Normal Sinus Rhythm  GI: Abdomen soft, Non-tender, Non-distended.    Extremities: Extremities warm,  well-perfused.  No Peripheral edema b/l LE   Derm: Good skin turgor, no skin breakdown.    : Freeman catheter in place.

## 2020-12-06 NOTE — DIETITIAN INITIAL EVALUATION ADULT. - OTHER INFO
Odynophagia a/w chest pain, s/p cardiac stenting, L TCAR, s/p stroke code. Neuro recs. Oxygen insufficiency weaning o2. monitoring bp.

## 2020-12-06 NOTE — DIETITIAN INITIAL EVALUATION ADULT. - REASON INDICATOR FOR ASSESSMENT
LOS assessment(I do not cover this unit previously) - pt is AOx4 but in great pain at the moment after breakfast (observed 25% of plate consumed). RN spoken with and aware of the pain and is currently being monitored. Rounds by SICU this morning. Pt is being set up with VEEG this morning. Otherwise, unable to obtain nutrition hx. LBM 12/6 per EMR. 1+ dependent edema. Wounds to skin. LOS assessment(I do not cover this unit previously) - pt is AOx4 but in great pain at the moment after breakfast (observed 25% of plate consumed). RN spoken with and aware of the pain and is currently being monitored. Rounds by SICU this morning. Pt is being set up with VEEG this morning. Otherwise, unable to obtain nutrition hx. LBM 12/6 per EMR. 1+ dependent edema. Wounds to skin. on IV fentanyl

## 2020-12-06 NOTE — PROGRESS NOTE ADULT - ATTENDING COMMENTS
I personally examined this patient with the PA and resident and discussed my plan with them.    perfusion nml, neurologic stable, no focal deficits, ao3  awaiting mri and eeg  home o2 prn  bp goals today normotensive, will wean barb  restarted po psych meds  will need to be off barb before leaving unit

## 2020-12-06 NOTE — PROGRESS NOTE ADULT - SUBJECTIVE AND OBJECTIVE BOX
Progress Note: General Surgery  Patient: SHAUN COATES , 75y (1945)Female   MRN: [id number]  [facility]  Visit: 11-29-20 Inpatient  Date: 12-06-20 @ 03:51    Procedure/Diagnosis:     Events/ 24h: Pt had Stroke code called. CT scan reads were negative. Pending MRI    Vitals: T(F): 96.7 (12-06-20 @ 00:00), Max: 97.6 (12-05-20 @ 08:00)  HR: 75 (12-06-20 @ 02:00)  BP: 154/72 (12-06-20 @ 02:00) (83/44 - 154/72)  RR: 16 (12-06-20 @ 00:45)  SpO2: 97% (12-06-20 @ 02:00)    In:   12-04-20 @ 07:01  -  12-05-20 @ 07:00  --------------------------------------------------------  IN: 1720 mL    12-05-20 @ 07:01  -  12-06-20 @ 03:51  --------------------------------------------------------  IN: 1419.9 mL      Out:   12-04-20 @ 07:01  -  12-05-20 @ 07:00  --------------------------------------------------------  OUT:    Voided (mL): 1030 mL  Total OUT: 1030 mL      12-05-20 @ 07:01  -  12-06-20 @ 03:51  --------------------------------------------------------  OUT:    Voided (mL): 1800 mL  Total OUT: 1800 mL        Net:   12-04-20 @ 07:01  -  12-05-20 @ 07:00  --------------------------------------------------------  NET: 690 mL    12-05-20 @ 07:01  -  12-06-20 @ 03:51  --------------------------------------------------------  NET: -380.1 mL        Diet: Diet, Consistent Carbohydrate w/Evening Snack (12-05-20 @ 17:06)    IV Fluids:     Physical Examination:  General Appearance: NAD  HEENT: EOMI, sclera non-icteric. Dressing in place, clean, dry  Heart: RRR   Lungs: CTABL.   Abdomen:  Soft, nontender, nondistended.   MSK/Extremities: Warm & well-perfused. Movement present in all limbs.   Skin: Warm, dry. No jaundice.       Medications: [Standing]  albuterol/ipratropium for Nebulization 3 milliLiter(s) Nebulizer every 4 hours  aspirin  chewable 81 milliGRAM(s) Oral daily  atorvastatin 20 milliGRAM(s) Oral at bedtime  budesonide 160 MICROgram(s)/formoterol 4.5 MICROgram(s) Inhaler 2 Puff(s) Inhalation two times a day  chlorhexidine 4% Liquid 1 Application(s) Topical <User Schedule>  clopidogrel Tablet 75 milliGRAM(s) Oral daily  dabigatran 150 milliGRAM(s) Oral every 12 hours  famotidine    Tablet 20 milliGRAM(s) Oral daily  gabapentin 300 milliGRAM(s) Oral three times a day  lamoTRIgine 100 milliGRAM(s) Oral daily  phenylephrine    Infusion 0.1 MICROgram(s)/kG/Min (3.18 mL/Hr) IV Continuous <Continuous>  QUEtiapine 200 milliGRAM(s) Oral at bedtime  tiotropium 18 MICROgram(s) Capsule 1 Capsule(s) Inhalation daily    DVT Prophylaxis:   GI Prophylaxis: famotidine    Tablet 20 milliGRAM(s) Oral daily    Antibiotics:   Anticoagulation:   Medications:[PRN]  acetaminophen   Tablet .. 650 milliGRAM(s) Oral every 6 hours PRN  LORazepam     Tablet 0.5 milliGRAM(s) Oral every 6 hours PRN      Labs:                        10.7   9.05  )-----------( 216      ( 05 Dec 2020 23:30 )             33.1     12-05    140  |  108  |  22<H>  ----------------------------<  124<H>  3.9   |  23  |  0.8    Ca    9.2      05 Dec 2020 23:30  Phos  2.6     12-05  Mg     2.2     12-05    TPro  6.0  /  Alb  3.5  /  TBili  0.8  /  DBili  x   /  AST  31  /  ALT  23  /  AlkPhos  182<H>  12-05    LIVER FUNCTIONS - ( 05 Dec 2020 06:10 )  Alb: 3.5 g/dL / Pro: 6.0 g/dL / ALK PHOS: 182 U/L / ALT: 23 U/L / AST: 31 U/L / GGT: x           PT/INR - ( 05 Dec 2020 23:30 )   PT: 14.50 sec;   INR: 1.26 ratio         PTT - ( 05 Dec 2020 23:30 )  PTT:32.4 sec    CARDIAC MARKERS ( 05 Dec 2020 06:10 )  x     / <0.01 ng/mL / x     / x     / x      CARDIAC MARKERS ( 05 Dec 2020 02:30 )  x     / <0.01 ng/mL / x     / x     / x      CARDIAC MARKERS ( 04 Dec 2020 16:30 )  x     / <0.01 ng/mL / x     / x     / x      CARDIAC MARKERS ( 04 Dec 2020 12:22 )  x     / x     / 24 U/L / x     / x      CARDIAC MARKERS ( 04 Dec 2020 11:22 )  x     / <0.01 ng/mL / x     / x     / 1.4 ng/mL      Urine/Micro:        Imaging:     Assessment:  75y Female patient admitted S/P carotid a stenting    Plan:  Maintain BP around 140 Systolic  MRI  SICU care    Date/Time: 12-06-20 @ 03:51

## 2020-12-06 NOTE — DIETITIAN INITIAL EVALUATION ADULT. - CONTINUE CURRENT NUTRITION CARE PLAN
pt to consume and tolerate >50%o f all meals and snacks upon f/u in 3 days. Meals and snacks. medical food supplement. RD to monitor diet order, energy intake, NFPF

## 2020-12-06 NOTE — DIETITIAN INITIAL EVALUATION ADULT. - OTHER CALCULATIONS
ABW: 84.4kg,  IBW: 50kg ---- CALORIE: 8597-8408 kcal/day (MSJ x 1.1-1.2);   PROTEIN: 50-70 g/day (1.2-1.4 g/kg of ABW);    FLUID: fluid per ICU team.

## 2020-12-06 NOTE — PROGRESS NOTE ADULT - ASSESSMENT
`Assessment & Plan    75y Female HD#5 POD#0 s/p left TCAR, right groin access 2/2 >80% stenosis of left ICA, recent TIA    NEURO:    Acute pain-controlled with  Tylenol, avoid narcotics    s/p carotid-q1 neurochecks    hx of TIA-on ASA    hx of bipolar disorder-holding lorazepam, seroquel 2/2 q1 neurochecks   will need MRI if carotid stents compatible and EEG to r/o cva seizure    RESP:     Oxygen insufficiency-wean to 5L nc (on home 5L oxygen)- goal 02 88-92    hx of COPD-restarted montelukast, tiotropium    hx of smoking (quit 10+yrs)-encourage IS    Pulm c/s-recs triple inhaler therapy, keep sat >92%    Activity-bedrest      CARDS:     SBP goal 140-started on barb currently at 0.85 mcg/kg/hr    hx of paroxAfib-on pradaxa, held 2/2 to OR, f/u with vasc cardiology to restart (Dr. Carney)    hx of hld-restarted atorvastatin    Imaging: post op EKG NSR    Labs: CE x3 negative     GI/NUTR:     Diet-Regular    GI Prophylaxis-Pepcid (home rx)    /RENAL:     Monitor UO-no stephens in place,voided 900cc as of 11pm     BUN/Cr- 22/0.9    HEME/ONC:     DVT prophylaxis-holding dvt prophylaxis    Hb/Hct: 11.7    ID:    no post op abx as per vascular fellow, d/w Dr. Esparza    ENDO:    Glucose Glucose, Serum: 89 (12-04 @ 05:31)    LINES/DRAINS:  [place] NUÑEZ    DISPO:    SICU d/w Dr. Galvin     `Assessment & Plan    75y Female HD#5 POD#0 s/p left TCAR, right groin access 2/2 >80% stenosis of left ICA, recent TIA    NEURO:    Acute pain-controlled with  Tylenol, avoid narcotics    s/p Left Tcar -q1 neurochecks    hx of TIA-on ASA  -stroke code yesterday- patient difficult word finding  -CT hd: No evidence of acute intracranial abnormality  -CT: Normal perfusion images of the brain.  -MRI brain pending   -started on ASA/plavix/pradaxa  --EEG pending  - ammonia 29  -h/o Bipolar - restarted all psych meds, lamictal, seroquel, ativan prn    RESP:     Oxygen insufficiency-wean to 5L nc (on home 5L oxygen)    hx of COPD-restarted montelukast, tiotropium    hx of smoking (quit 10+yrs)-encourage IS    Pulm c/s-recs triple inhaler therapy, keep sat >92%    Activity-bedrest      CARDS:     SBP continue to keep goal 140, on Bebeto gtt.  Per vascular, can start weaning parameters for goal SBP >110    hx of paroxAfib -on pradaxa, restarted    hx of hld-restarted atorvastatin    Imaging: post op EKG NSR    Labs: CE x3 negative     GI/NUTR:     Diet-Regular    GI Prophylaxis-Pepcid (home rx)    /RENAL:     Monitor UO, voiding without difficulty    BUN/Cr- 22/0.9    HEME/ONC:     DVT prophylaxis- Pradaxa, ASA/Plavix    Hb/Hct: 11.5    ID:    no post op abx as per vascular fellow, d/w Dr. Esparza    ENDO:    FS QAC and QHS  RISS    LINES/DRAINS:  [place] NUÑEZ    DISPO:    SICU

## 2020-12-07 LAB
APPEARANCE UR: CLEAR — SIGNIFICANT CHANGE UP
BACTERIA # UR AUTO: ABNORMAL
BILIRUB UR-MCNC: NEGATIVE — SIGNIFICANT CHANGE UP
CK MB CFR SERPL CALC: 1.3 NG/ML — SIGNIFICANT CHANGE UP (ref 0.6–6.3)
CK MB CFR SERPL CALC: 1.4 NG/ML — SIGNIFICANT CHANGE UP (ref 0.6–6.3)
CK SERPL-CCNC: 24 U/L — SIGNIFICANT CHANGE UP (ref 0–225)
COLOR SPEC: YELLOW — SIGNIFICANT CHANGE UP
DIFF PNL FLD: ABNORMAL
EPI CELLS # UR: 2 /HPF — SIGNIFICANT CHANGE UP (ref 0–5)
GLUCOSE BLDC GLUCOMTR-MCNC: 179 MG/DL — HIGH (ref 70–99)
GLUCOSE UR QL: NEGATIVE — SIGNIFICANT CHANGE UP
HYALINE CASTS # UR AUTO: 0 /LPF — SIGNIFICANT CHANGE UP (ref 0–7)
KETONES UR-MCNC: NEGATIVE — SIGNIFICANT CHANGE UP
LEUKOCYTE ESTERASE UR-ACNC: ABNORMAL
NITRITE UR-MCNC: NEGATIVE — SIGNIFICANT CHANGE UP
PH UR: 6 — SIGNIFICANT CHANGE UP (ref 5–8)
PROT UR-MCNC: SIGNIFICANT CHANGE UP
RBC CASTS # UR COMP ASSIST: 71 /HPF — HIGH (ref 0–4)
SP GR SPEC: 1.02 — SIGNIFICANT CHANGE UP (ref 1.01–1.03)
TROPONIN T SERPL-MCNC: <0.01 NG/ML — SIGNIFICANT CHANGE UP
TROPONIN T SERPL-MCNC: <0.01 NG/ML — SIGNIFICANT CHANGE UP
UROBILINOGEN FLD QL: SIGNIFICANT CHANGE UP
WBC UR QL: 6 /HPF — HIGH (ref 0–5)

## 2020-12-07 PROCEDURE — 99291 CRITICAL CARE FIRST HOUR: CPT

## 2020-12-07 PROCEDURE — 71045 X-RAY EXAM CHEST 1 VIEW: CPT | Mod: 26

## 2020-12-07 PROCEDURE — 99233 SBSQ HOSP IP/OBS HIGH 50: CPT

## 2020-12-07 PROCEDURE — 95816 EEG AWAKE AND DROWSY: CPT | Mod: 26

## 2020-12-07 RX ORDER — ACETAMINOPHEN 500 MG
650 TABLET ORAL EVERY 6 HOURS
Refills: 0 | Status: DISCONTINUED | OUTPATIENT
Start: 2020-12-07 | End: 2020-12-13

## 2020-12-07 RX ORDER — SODIUM CHLORIDE 9 MG/ML
250 INJECTION, SOLUTION INTRAVENOUS ONCE
Refills: 0 | Status: COMPLETED | OUTPATIENT
Start: 2020-12-07 | End: 2020-12-07

## 2020-12-07 RX ORDER — LABETALOL HCL 100 MG
5 TABLET ORAL ONCE
Refills: 0 | Status: COMPLETED | OUTPATIENT
Start: 2020-12-07 | End: 2020-12-07

## 2020-12-07 RX ORDER — VANCOMYCIN HCL 1 G
VIAL (EA) INTRAVENOUS
Refills: 0 | Status: DISCONTINUED | OUTPATIENT
Start: 2020-12-07 | End: 2020-12-08

## 2020-12-07 RX ORDER — METOPROLOL TARTRATE 50 MG
25 TABLET ORAL
Refills: 0 | Status: DISCONTINUED | OUTPATIENT
Start: 2020-12-07 | End: 2020-12-07

## 2020-12-07 RX ORDER — PHENYLEPHRINE HYDROCHLORIDE 10 MG/ML
0.1 INJECTION INTRAVENOUS
Qty: 40 | Refills: 0 | Status: DISCONTINUED | OUTPATIENT
Start: 2020-12-07 | End: 2020-12-07

## 2020-12-07 RX ORDER — LABETALOL HCL 100 MG
10 TABLET ORAL ONCE
Refills: 0 | Status: COMPLETED | OUTPATIENT
Start: 2020-12-07 | End: 2020-12-07

## 2020-12-07 RX ORDER — METOPROLOL TARTRATE 50 MG
12.5 TABLET ORAL EVERY 8 HOURS
Refills: 0 | Status: DISCONTINUED | OUTPATIENT
Start: 2020-12-07 | End: 2020-12-13

## 2020-12-07 RX ORDER — VANCOMYCIN HCL 1 G
1000 VIAL (EA) INTRAVENOUS EVERY 12 HOURS
Refills: 0 | Status: DISCONTINUED | OUTPATIENT
Start: 2020-12-07 | End: 2020-12-08

## 2020-12-07 RX ORDER — CEFEPIME 1 G/1
INJECTION, POWDER, FOR SOLUTION INTRAMUSCULAR; INTRAVENOUS
Refills: 0 | Status: DISCONTINUED | OUTPATIENT
Start: 2020-12-07 | End: 2020-12-08

## 2020-12-07 RX ORDER — FUROSEMIDE 40 MG
40 TABLET ORAL DAILY
Refills: 0 | Status: DISCONTINUED | OUTPATIENT
Start: 2020-12-07 | End: 2020-12-07

## 2020-12-07 RX ORDER — CEFEPIME 1 G/1
1000 INJECTION, POWDER, FOR SOLUTION INTRAMUSCULAR; INTRAVENOUS ONCE
Refills: 0 | Status: COMPLETED | OUTPATIENT
Start: 2020-12-07 | End: 2020-12-07

## 2020-12-07 RX ORDER — METOPROLOL TARTRATE 50 MG
12.5 TABLET ORAL ONCE
Refills: 0 | Status: COMPLETED | OUTPATIENT
Start: 2020-12-07 | End: 2020-12-07

## 2020-12-07 RX ORDER — ACETAMINOPHEN 500 MG
1000 TABLET ORAL ONCE
Refills: 0 | Status: COMPLETED | OUTPATIENT
Start: 2020-12-07 | End: 2020-12-07

## 2020-12-07 RX ORDER — SODIUM CHLORIDE 9 MG/ML
250 INJECTION, SOLUTION INTRAVENOUS ONCE
Refills: 0 | Status: DISCONTINUED | OUTPATIENT
Start: 2020-12-07 | End: 2020-12-07

## 2020-12-07 RX ORDER — VANCOMYCIN HCL 1 G
1000 VIAL (EA) INTRAVENOUS ONCE
Refills: 0 | Status: COMPLETED | OUTPATIENT
Start: 2020-12-07 | End: 2020-12-07

## 2020-12-07 RX ORDER — SODIUM CHLORIDE 9 MG/ML
500 INJECTION, SOLUTION INTRAVENOUS ONCE
Refills: 0 | Status: COMPLETED | OUTPATIENT
Start: 2020-12-07 | End: 2020-12-07

## 2020-12-07 RX ORDER — CEFEPIME 1 G/1
1000 INJECTION, POWDER, FOR SOLUTION INTRAMUSCULAR; INTRAVENOUS EVERY 8 HOURS
Refills: 0 | Status: DISCONTINUED | OUTPATIENT
Start: 2020-12-07 | End: 2020-12-08

## 2020-12-07 RX ADMIN — Medication 3 MILLILITER(S): at 15:48

## 2020-12-07 RX ADMIN — Medication 5 MILLIGRAM(S): at 20:57

## 2020-12-07 RX ADMIN — ATORVASTATIN CALCIUM 20 MILLIGRAM(S): 80 TABLET, FILM COATED ORAL at 21:37

## 2020-12-07 RX ADMIN — GABAPENTIN 300 MILLIGRAM(S): 400 CAPSULE ORAL at 05:36

## 2020-12-07 RX ADMIN — Medication 3 MILLILITER(S): at 11:25

## 2020-12-07 RX ADMIN — SODIUM CHLORIDE 1000 MILLILITER(S): 9 INJECTION, SOLUTION INTRAVENOUS at 03:41

## 2020-12-07 RX ADMIN — CEFEPIME 100 MILLIGRAM(S): 1 INJECTION, POWDER, FOR SOLUTION INTRAMUSCULAR; INTRAVENOUS at 12:24

## 2020-12-07 RX ADMIN — CLOPIDOGREL BISULFATE 75 MILLIGRAM(S): 75 TABLET, FILM COATED ORAL at 12:23

## 2020-12-07 RX ADMIN — Medication 650 MILLIGRAM(S): at 09:30

## 2020-12-07 RX ADMIN — CEFEPIME 100 MILLIGRAM(S): 1 INJECTION, POWDER, FOR SOLUTION INTRAMUSCULAR; INTRAVENOUS at 21:37

## 2020-12-07 RX ADMIN — GABAPENTIN 300 MILLIGRAM(S): 400 CAPSULE ORAL at 13:01

## 2020-12-07 RX ADMIN — Medication 250 MILLIGRAM(S): at 12:23

## 2020-12-07 RX ADMIN — Medication 81 MILLIGRAM(S): at 13:38

## 2020-12-07 RX ADMIN — SODIUM CHLORIDE 3000 MILLILITER(S): 9 INJECTION, SOLUTION INTRAVENOUS at 23:30

## 2020-12-07 RX ADMIN — Medication 3 MILLILITER(S): at 23:06

## 2020-12-07 RX ADMIN — DABIGATRAN ETEXILATE MESYLATE 150 MILLIGRAM(S): 150 CAPSULE ORAL at 05:36

## 2020-12-07 RX ADMIN — SODIUM CHLORIDE 6000 MILLILITER(S): 9 INJECTION, SOLUTION INTRAVENOUS at 03:20

## 2020-12-07 RX ADMIN — BUDESONIDE AND FORMOTEROL FUMARATE DIHYDRATE 2 PUFF(S): 160; 4.5 AEROSOL RESPIRATORY (INHALATION) at 21:00

## 2020-12-07 RX ADMIN — Medication 3 MILLILITER(S): at 19:46

## 2020-12-07 RX ADMIN — QUETIAPINE FUMARATE 200 MILLIGRAM(S): 200 TABLET, FILM COATED ORAL at 21:06

## 2020-12-07 RX ADMIN — Medication 400 MILLIGRAM(S): at 22:21

## 2020-12-07 RX ADMIN — FAMOTIDINE 20 MILLIGRAM(S): 10 INJECTION INTRAVENOUS at 12:23

## 2020-12-07 RX ADMIN — Medication 62.5 MILLIMOLE(S): at 05:26

## 2020-12-07 RX ADMIN — GABAPENTIN 300 MILLIGRAM(S): 400 CAPSULE ORAL at 21:06

## 2020-12-07 RX ADMIN — LAMOTRIGINE 100 MILLIGRAM(S): 25 TABLET, ORALLY DISINTEGRATING ORAL at 12:23

## 2020-12-07 RX ADMIN — Medication 3 MILLILITER(S): at 08:32

## 2020-12-07 RX ADMIN — CHLORHEXIDINE GLUCONATE 1 APPLICATION(S): 213 SOLUTION TOPICAL at 05:36

## 2020-12-07 RX ADMIN — Medication 0.5 MILLIGRAM(S): at 19:48

## 2020-12-07 RX ADMIN — Medication 1000 MILLIGRAM(S): at 23:57

## 2020-12-07 RX ADMIN — DABIGATRAN ETEXILATE MESYLATE 150 MILLIGRAM(S): 150 CAPSULE ORAL at 17:58

## 2020-12-07 RX ADMIN — Medication 12.5 MILLIGRAM(S): at 12:23

## 2020-12-07 RX ADMIN — Medication 650 MILLIGRAM(S): at 10:30

## 2020-12-07 RX ADMIN — Medication 250 MILLIGRAM(S): at 17:59

## 2020-12-07 RX ADMIN — BUDESONIDE AND FORMOTEROL FUMARATE DIHYDRATE 2 PUFF(S): 160; 4.5 AEROSOL RESPIRATORY (INHALATION) at 09:27

## 2020-12-07 RX ADMIN — Medication 12.5 MILLIGRAM(S): at 17:59

## 2020-12-07 RX ADMIN — SODIUM CHLORIDE 3000 MILLILITER(S): 9 INJECTION, SOLUTION INTRAVENOUS at 13:02

## 2020-12-07 RX ADMIN — Medication 10 MILLIGRAM(S): at 22:21

## 2020-12-07 NOTE — PHYSICAL THERAPY INITIAL EVALUATION ADULT - SPECIFY REASON(S)
Pt's chart reviewed. Pt is currently is on r/o Covid-19, having fever >100. Will hold PT for now & follow up once pt is medically clear for PT.

## 2020-12-07 NOTE — PROGRESS NOTE ADULT - ASSESSMENT
Assessment:  75y Female patient admitted S/P carotid a stenting    Plan:  SICU care  physical therapy   neuro consult for difficulty finding words   downgrade to floor

## 2020-12-07 NOTE — PROGRESS NOTE ADULT - ATTENDING COMMENTS
I examined the patient with the PA/resident and discussed my plan with the Resident/PA.  I personally provided over 30 minutes of direct critical care to this patient. I agree with the above resident/pa note unless directly contradicted below.     SHAUN COATES 75F s/p left TCAR, right groin access 2/2 >80% stenosis of left ICA, recent TIA, stroke code negative w/u.       Pain controlled with tylenol   Stroke w/u negative - MRI, CT, EEG , perfusion scan  continue ASA, plavix, pradaxa, Statin home meds  NICOM responsive overnight received 750cc bolus   Afib- Pradaxa , restart home lopressor, home Lasix    chest pain overnight with deep breath - ekg no st changes, troponins negative x1   Home medication-  Seroquel, Lamictal , low dose ativan PRn  COPD- 5LNC at home, continue home inhalers pulm following   Bebeto-Synephrine gtt for sbp > 110 , now off this am   reg diet, Pepcid home med   fever 101.1--> pan culture, procalcitonin , start vanc/ cefepime   DVT sono - negative    stay in SICU for fever work up

## 2020-12-07 NOTE — PROGRESS NOTE ADULT - SUBJECTIVE AND OBJECTIVE BOX
GENERAL SURGERY PROGRESS NOTE     SHAUN COATES  Female  Hospital day :8d  POD:  Procedure: Carotid artery stenting      OVERNIGHT EVENTS: chest pain , ekg negative , cardiac enzymes negative     T(F): 98.7 (12-07-20 @ 03:00), Max: 99.2 (12-07-20 @ 00:12)  HR: 77 (12-07-20 @ 07:00) (60 - 82)  BP: 93/50 (12-06-20 @ 16:00) (92/50 - 110/56)  ABP: 157/64 (12-07-20 @ 07:00) (100/48 - 174/63)  ABP(mean): 95 (12-07-20 @ 07:00) (65 - 105)  RR: 20 (12-07-20 @ 03:00) (20 - 24)  SpO2: 96% (12-07-20 @ 07:00) (92% - 100%)    DIET/FLUIDS: lactated ringers Bolus 250 milliLiter(s) IV Bolus once       GI proph:  famotidine    Tablet 20 milliGRAM(s) Oral daily    AC/ proph: aspirin  chewable 81 milliGRAM(s) Oral daily  clopidogrel Tablet 75 milliGRAM(s) Oral daily    ABx:       Physical Examination:  General Appearance: NAD  HEENT: EOMI, sclera non-icteric. Dressing in place, clean, dry  Heart: RRR   Lungs: CTABL.   Abdomen:  Soft, nontender, nondistended.   MSK/Extremities: Warm & well-perfused. Movement present in all limbs.   Skin: Warm, dry. No jaundice.         LABS  Labs:  CAPILLARY BLOOD GLUCOSE                              10.1   5.74  )-----------( 172      ( 06 Dec 2020 22:12 )             32.3       Auto Neutrophil %: 66.9 % (12-06-20 @ 22:12)  Auto Immature Granulocyte %: 0.2 % (12-06-20 @ 22:12)    12-06    140  |  109  |  22<H>  ----------------------------<  102<H>  4.1   |  22  |  0.9      Calcium, Total Serum: 8.2 mg/dL (12-06-20 @ 22:12)      LFTs:         Coags:     14.50  ----< 1.26    ( 06 Dec 2020 22:12 )     36.4        CARDIAC MARKERS ( 07 Dec 2020 04:20 )  x     / <0.01 ng/mL / 24 U/L / x     / 1.4 ng/mL  CARDIAC MARKERS ( 06 Dec 2020 22:12 )  x     / <0.01 ng/mL / 36 U/L / x     / 1.7 ng/mL      Serum Pro-Brain Natriuretic Peptide: 556 pg/mL (11-29-20 @ 13:15)              RADIOLOGY & ADDITIONAL TESTS:      A/P

## 2020-12-07 NOTE — PROGRESS NOTE ADULT - SUBJECTIVE AND OBJECTIVE BOX
SHAUN COATES  [id number]  75y Female    Indication for ICU admission: L TCAR , POD # 1 stroke code    Admit Date:11-29-20  ICU Date: 12/4  OR Date: 12/4     codeine (Other (Moderate))  Depakote (Unknown)  Dilaudid (Short breath; Rash)  IV Contrast (Anaphylaxis)  losartan (Angioedema)  Risperdal (Other)  verapamil (Short breath; Angioedema)    PAST MEDICAL & SURGICAL HISTORY:  Falls    Congestive heart failure    Transient ischemic attack    FLAVIO on CPAP    Bipolar 1 disorder    Allergic reaction    PVD (peripheral vascular disease)    Other emphysema    Other cardiomyopathy    TIA (transient ischemic attack)    COPD (chronic obstructive pulmonary disease)    Depression    Hypertension    History of cholecystectomy      Home Medications:  amLODIPine 2.5 mg oral tablet: 1 tab(s) orally once a day (29 Nov 2020 16:46)  aspirin 81 mg oral tablet: 1 tab(s) orally once a day (29 Nov 2020 16:46)  atorvastatin 20 mg oral tablet: 1 tab(s) orally once a day (29 Nov 2020 16:46)  budesonide-formoterol 160 mcg-4.5 mcg/inh inhalation aerosol: 2 puff(s) inhaled 2 times a day (29 Nov 2020 16:46)  furosemide 40 mg oral tablet: 1 tab(s) orally once a day (29 Nov 2020 16:46)  lamoTRIgine 100 mg oral tablet, extended release: 1 tab(s) orally once a day (29 Nov 2020 16:46)  LORazepam 0.5 mg oral tablet: 1 tab(s) orally 2 times a day, As Needed (29 Nov 2020 16:46)  Metoprolol Tartrate 100 mg oral tablet: 1 tab(s) orally 2 times a day (29 Nov 2020 16:46)  montelukast 10 mg oral tablet: 1 tab(s) orally once a day (29 Nov 2020 16:46)  Pepcid 20 mg oral tablet: 1 tab(s) orally once a day (29 Nov 2020 16:46)  Plavix 75 mg oral tablet: 1 tab(s) orally once a day (29 Nov 2020 16:46)  QUEtiapine 200 mg oral tablet: 1 tab(s) orally once a day (at bedtime) (29 Nov 2020 16:46)  tiotropium 18 mcg inhalation capsule: 1 cap(s) inhaled once a day (02 Dec 2020 13:51)        24HRS EVENT:  DAY:    - ON:  - left side cp , EKG NSR, CE neg x1  - bebeto at .2  - HR/BP WNL , voiding, afebrile           DVT PTX: ASA/Plavix, Pradaxa    GI PTX: Famotidine       ***Tubes/Lines/Drains  ***  Peripheral IV  Urinary Catheter		      REVIEW OF SYSTEMS    [x] A ten-point review of systems was otherwise negative except as noted.  [ ] Due to altered mental status/intubation, subjective information were not able to be obtained from the patient. History was obtained, to the extent possible, from review of the chart and collateral sources of information.      Assessment & Plan    75y Female HD#5 POD#0 s/p left TCAR, right groin access 2/2 >80% stenosis of left ICA, recent TIA    NEURO:    Acute pain-controlled with  Tylenol, avoid narcotics    s/p Left Tcar -q1 neurochecks    hx of TIA-on ASA  -stroke code - patient difficult word finding  -CT hd: No evidence of acute intracranial abnormality  -CT: Normal perfusion images of the brain.  -MRI brain: no acute infact  -keep SBP >110  -started on ASA/plavix/pradaxa  --EEG done --> f/up results  -TSH, ammonia  -h/o Bipolar - restarted all psych meds, lamictal, seroquel, ativan prn    RESP:     Oxygen insufficiency-wean to 5L nc (on home 5L oxygen)    hx of COPD-restarted montelukast, tiotropium    hx of smoking (quit 10+yrs)-encourage IS    Pulm c/s-recs triple inhaler therapy, keep sat >92%    Activity-bedrest      CARDS:     SBP continue to keep goal 110, trying to wean Bebeto off    hx of paroxAfib -on pradaxa, restrted     hx of hld-restarted atorvastatin    Imaging: post op EKG NSR    Labs: CE x3 negative   -Nicom responsive, got total 750cc bplus, trying to wean off Bebeto    GI/NUTR:     Diet-Regular    GI Prophylaxis-Pepcid (home rx)    /RENAL:     Monitor UO, voiding without difficulty    BUN/Cr- 22/0.9    HEME/ONC:     DVT prophylaxis- Pradaxa, ASA/Plavix    Hb/Hct: 10.7  -DVT sono: prel neg    ID:    no post op abx as per vascular fellow, d/w Dr. Esparza    ENDO:    FS QAC and QHS  RISS    LINES/DRAINS:  [place] NUÑEZ    DISPO:    SICU              SHAUN COATES  [id number]  75y Female    Indication for ICU admission: L TCAR , POD # 1 stroke code    Admit Date:20  ICU Date:   OR Date:      codeine (Other (Moderate))  Depakote (Unknown)  Dilaudid (Short breath; Rash)  IV Contrast (Anaphylaxis)  losartan (Angioedema)  Risperdal (Other)  verapamil (Short breath; Angioedema)    PAST MEDICAL & SURGICAL HISTORY:  Falls    Congestive heart failure    Transient ischemic attack    FLAVIO on CPAP    Bipolar 1 disorder    Allergic reaction    PVD (peripheral vascular disease)    Other emphysema    Other cardiomyopathy    TIA (transient ischemic attack)    COPD (chronic obstructive pulmonary disease)    Depression    Hypertension    History of cholecystectomy      Home Medications:  amLODIPine 2.5 mg oral tablet: 1 tab(s) orally once a day (2020 16:46)  aspirin 81 mg oral tablet: 1 tab(s) orally once a day (2020 16:46)  atorvastatin 20 mg oral tablet: 1 tab(s) orally once a day (2020 16:46)  budesonide-formoterol 160 mcg-4.5 mcg/inh inhalation aerosol: 2 puff(s) inhaled 2 times a day (2020 16:46)  furosemide 40 mg oral tablet: 1 tab(s) orally once a day (2020 16:46)  lamoTRIgine 100 mg oral tablet, extended release: 1 tab(s) orally once a day (2020 16:46)  LORazepam 0.5 mg oral tablet: 1 tab(s) orally 2 times a day, As Needed (2020 16:46)  Metoprolol Tartrate 100 mg oral tablet: 1 tab(s) orally 2 times a day (2020 16:46)  montelukast 10 mg oral tablet: 1 tab(s) orally once a day (2020 16:46)  Pepcid 20 mg oral tablet: 1 tab(s) orally once a day (2020 16:46)  Plavix 75 mg oral tablet: 1 tab(s) orally once a day (2020 16:46)  QUEtiapine 200 mg oral tablet: 1 tab(s) orally once a day (at bedtime) (2020 16:46)  tiotropium 18 mcg inhalation capsule: 1 cap(s) inhaled once a day (02 Dec 2020 13:51)        24HRS EVENT:  DAY:    - ON:  - left side cp , EKG NSR, CE neg x1  - barb at .2  - HR/BP WNL , voiding, afebrile           DVT PTX: ASA/Plavix, Pradaxa    GI PTX: Famotidine       ***Tubes/Lines/Drains  ***  Peripheral IV  Urinary Catheter		      REVIEW OF SYSTEMS    [x] A ten-point review of systems was otherwise negative except as noted.  [ ] Due to altered mental status/intubation, subjective information were not able to be obtained from the patient. History was obtained, to the extent possible, from review of the chart and collateral sources of information.    Daily     Daily Weight in k.3 (07 Dec 2020 03:20)    Diet, Consistent Carbohydrate w/Evening Snack (20 @ 17:06)      CURRENT MEDS:  Neurologic Medications  acetaminophen   Tablet .. 650 milliGRAM(s) Oral every 6 hours PRN Temp greater or equal to 38.5C (101.3F), Mild Pain (1 - 3)  gabapentin 300 milliGRAM(s) Oral three times a day  lamoTRIgine 100 milliGRAM(s) Oral daily  LORazepam     Tablet 0.5 milliGRAM(s) Oral every 6 hours PRN Anxiety  QUEtiapine 200 milliGRAM(s) Oral at bedtime    Respiratory Medications  albuterol/ipratropium for Nebulization 3 milliLiter(s) Nebulizer every 4 hours  budesonide 160 MICROgram(s)/formoterol 4.5 MICROgram(s) Inhaler 2 Puff(s) Inhalation two times a day  tiotropium 18 MICROgram(s) Capsule 1 Capsule(s) Inhalation daily    Cardiovascular Medications  metoprolol tartrate 25 milliGRAM(s) Oral two times a day  metoprolol tartrate 12.5 milliGRAM(s) Oral once    Gastrointestinal Medications  famotidine    Tablet 20 milliGRAM(s) Oral daily    Genitourinary Medications    Hematologic/Oncologic Medications  aspirin  chewable 81 milliGRAM(s) Oral daily  clopidogrel Tablet 75 milliGRAM(s) Oral daily  dabigatran 150 milliGRAM(s) Oral every 12 hours    Antimicrobial/Immunologic Medications    Endocrine/Metabolic Medications  atorvastatin 20 milliGRAM(s) Oral at bedtime    Topical/Other Medications  chlorhexidine 4% Liquid 1 Application(s) Topical <User Schedule>      ICU Vital Signs Last 24 Hrs  T(C): 38.3 (07 Dec 2020 11:00), Max: 38.4 (07 Dec 2020 09:00)  T(F): 100.9 (07 Dec 2020 11:), Max: 101.1 (07 Dec 2020 09:00)  HR: 104 (07 Dec 2020 11:) (60 - 108)  BP: 95/48 (07 Dec 2020 11:) (93/50 - 110/56)  BP(mean): 69 (07 Dec 2020 11:) (68 - 80)  ABP: 121/49 (07 Dec 2020 11:) (108/63 - 183/68)  ABP(mean): 69 (07 Dec 2020 11:) (68 - 105)  RR: 22 (07 Dec 2020 11:) (18 - 24)  SpO2: 98% (07 Dec 2020 11:) (92% - 100%)      Adult Advanced Hemodynamics Last 24 Hrs  CVP(mm Hg): --  CVP(cm H2O): --  CO: --  CI: --  PA: --  PA(mean): --  PCWP: --  SVR: --  SVRI: --  PVR: --  PVRI: --          I&O's Summary    06 Dec 2020 07:  -  07 Dec 2020 07:00  --------------------------------------------------------  IN: 2975.4 mL / OUT: 500 mL / NET: 2475.4 mL    07 Dec 2020 07:  -  07 Dec 2020 11:47  --------------------------------------------------------  IN: 185 mL / OUT: 350 mL / NET: -165 mL      I&O's Detail    06 Dec 2020 07:  -  07 Dec 2020 07:00  --------------------------------------------------------  IN:    Lactated Ringers Bolus: 1500 mL    Oral Fluid: 1260 mL    Phenylephrine: 215.4 mL  Total IN: 2975.4 mL    OUT:    Voided (mL): 500 mL  Total OUT: 500 mL    Total NET: 2475.4 mL      07 Dec 2020 07:01  -  07 Dec 2020 11:47  --------------------------------------------------------  IN:    IV PiggyBack: 125 mL    Oral Fluid: 60 mL  Total IN: 185 mL    OUT:    Voided (mL): 350 mL  Total OUT: 350 mL    Total NET: -165 mL      PHYSICAL EXAM:    General/Neuro  GCS: 15   Deficits:     alert & oriented x 3, no focal deficits  B/l hand tremor     Lungs:      clear to auscultation, Normal expansion/effort.     Cardiovascular : S1, S2.  Tachycardic.  Peripheral edema     GI: Abdomen soft, Non-tender, Non-distended.      Extremities: Extremities warm but cyanotic    Derm: Good skin turgor, no skin breakdown.      :       Freeman catheter in place.    LABS:  CAPILLARY BLOOD GLUCOSE      POCT Blood Glucose.: 179 mg/dL (07 Dec 2020 11:12)                          10.1   5.74  )-----------( 172      ( 06 Dec 2020 22:12 )             32.3       12-06    140  |  109  |  22<H>  ----------------------------<  102<H>  4.1   |  22  |  0.9    Ca    8.2<L>      06 Dec 2020 22:12  Phos  2.9     12-06  Mg     2.0     12-06        PT/INR - ( 06 Dec 2020 22:12 )   PT: 14.50 sec;   INR: 1.26 ratio         PTT - ( 06 Dec 2020 22:12 )  PTT:36.4 sec  CARDIAC MARKERS ( 07 Dec 2020 10:05 )  x     / <0.01 ng/mL / x     / x     / 1.3 ng/mL  CARDIAC MARKERS ( 07 Dec 2020 04:20 )  x     / <0.01 ng/mL / 24 U/L / x     / 1.4 ng/mL  CARDIAC MARKERS ( 06 Dec 2020 22:12 )  x     / <0.01 ng/mL / 36 U/L / x     / 1.7 ng/mL

## 2020-12-07 NOTE — PROGRESS NOTE ADULT - ASSESSMENT
`Assessment & Plan    75y Female HD#5 POD#0 s/p left TCAR, right groin access 2/2 >80% stenosis of left ICA, recent TIA    NEURO:    Acute pain-controlled with  Tylenol, avoid narcotics    s/p Left Tcar -q1 neurochecks    hx of TIA-on ASA  -stroke code yesterday- patient difficult word finding  -CT hd: No evidence of acute intracranial abnormality  -CT: Normal perfusion images of the brain.  -MRI brain no acute findinds   -started on ASA/plavix/pradaxa  --EEG pending  - ammonia 29  -h/o Bipolar - restarted all psych meds, lamictal, seroquel, ativan prn    RESP:     Oxygen insufficiency-wean to 3L nc (on home 5L oxygen)    hx of COPD-restarted montelukast, tiotropium    hx of smoking (quit 10+yrs)-encourage IS    Pulm c/s-recs triple inhaler therapy, keep sat >92%    Activity-bedrest      CARDS:     SBP continue to keep goal 140, on Bebeto gtt.  Per vascular, can start weaning parameters for goal SBP >110    hx of paroxAfib -on pradaxa, restarted    hx of hld-restarted atorvastatin    Imaging: post op EKG NSR    Labs: CE x3 negative     GI/NUTR:     Diet-Regular    GI Prophylaxis-Pepcid (home rx)    /RENAL:     Monitor UO, voiding without difficulty    BUN/Cr-     HEME/ONC:     DVT prophylaxis- Pradaxa, ASA/Plavix    Hb/Hct: 11.5    ID:    no post op abx as per vascular fellow, d/w Dr. Esparza    ENDO:    FS QAC and QHS  RISS    LINES/DRAINS:  [place] NUÑEZ    DISPO:    SICU

## 2020-12-07 NOTE — PROGRESS NOTE ADULT - ATTENDING COMMENTS
Patient seen today with neurocritical care NP Bridger.     I was physically present for the key portions of the evaluation and management (E/M) service provided.  I agree with the above history, physical, and plan with the following additions/modifications     Patient describes very clear history of acute onset difficulty with expressive language, "I know what I want to say but it won't come out", denies any prior language difficultie ever before.   Exam notable for mild-moderate Broca-type aphasia, with otherwise entirely normal comprehension, command-following, attention, and recall.    Given her recent left sided ICA manipulation, there is a reasonably high likelihood this is a neurovascular phenomenon, possibly very small DWI-negative stroke in eloquent cortex.    Will continue ASA and statin regardless, and anticoagulation (pradaxa) for afib, so diagnosis of new tiny stroke will not change immediate management.  Would follow clinically and if this does not improve in a few days can consider repeat MRI, otherwise neurology will sign off, please reconsult if no improvement over time or if any acute findings on repeat imaging.      Jacob Ramirez MD  Attending Neurointensivist

## 2020-12-07 NOTE — PROGRESS NOTE ADULT - SUBJECTIVE AND OBJECTIVE BOX
Neurocritical Care Progress Note    SHAUN COATES    75y     [id number]   Daily Height in cm: 157.5 (06 Dec 2020 10:03)    Daily Weight in k.3 (07 Dec 2020 03:20)  COVID-19 PCR: NotDetec (2020 13:30)  SARS-CoV-2: NotDetec (14 Oct 2020 18:06)    Patient is a 75y old  Female who presents with a chief complaint of Odynophagia associated with chest pain (07 Dec 2020 08:10)    HPI:  Chief Complaint: Chest pain after swallowing     Past Medical History: COPD on 5L home O2, paroxysmal afib, carotid stenosis, HFpEF, HTN, HLD, Bipolar disorder    Past Surgical History: None relevant     History of present illness goes back to 6AM on the morning of presentation when the patient first noticed chest pain that was associated with swallowing. She states that she was in her usual state of health the night prior. Of note, she lives with her daughter who is currently in their home quarantining, but the patient states her daughter is not doing a good job and was concerned that she might have COVID, so she went to urgent care and got tested, results pending. Her chest pain was described as severe when she swallowed, radiating to her neck and shoulder so she called EMS and was brought to the ED.      In the ED, her vitals were normal. EMS reported that in transit there was some anterior leads that showed TWI, but repeat EKG in the ED did not show any changes. CXR did however show a LLL opacity, increased from last month's CXR representing a possible developing pneumonia. She also wanted to come to the ED because she is planning to undergo a carotid endarterectomy on 12/15 and was concerned that uf she was ill, she would need to postpone the procedure. Rapid COVID was negative, PCR pending. Admitted by ED for ACS rule-out.     ROS: Denies headache, changes in vision, chest pain, palpitations, shortness of breath, cough, fever, chills, nausea, vomiting, diarrhea, and constipation.  (2020 16:06)    Allergies:  codeine (Other (Moderate))  Depakote (Unknown)  Dilaudid (Short breath; Rash)  IV Contrast (Anaphylaxis)  losartan (Angioedema)  Risperdal (Other)  verapamil (Short breath; Angioedema)    PAST MEDICAL & SURGICAL HISTORY:  Falls    Congestive heart failure    Transient ischemic attack    FLAVIO on CPAP    Bipolar 1 disorder    Allergic reaction    PVD (peripheral vascular disease)    Other emphysema    Other cardiomyopathy    TIA (transient ischemic attack)    COPD (chronic obstructive pulmonary disease)    Depression    Hypertension    History of cholecystectomy    Home Medications:  amLODIPine 2.5 mg oral tablet: 1 tab(s) orally once a day (2020 16:46)  aspirin 81 mg oral tablet: 1 tab(s) orally once a day (2020 16:46)  atorvastatin 20 mg oral tablet: 1 tab(s) orally once a day (2020 16:46)  budesonide-formoterol 160 mcg-4.5 mcg/inh inhalation aerosol: 2 puff(s) inhaled 2 times a day (2020 16:46)  furosemide 40 mg oral tablet: 1 tab(s) orally once a day (2020 16:46)  lamoTRIgine 100 mg oral tablet, extended release: 1 tab(s) orally once a day (2020 16:46)  LORazepam 0.5 mg oral tablet: 1 tab(s) orally 2 times a day, As Needed (2020 16:46)  Metoprolol Tartrate 100 mg oral tablet: 1 tab(s) orally 2 times a day (2020 16:46)  montelukast 10 mg oral tablet: 1 tab(s) orally once a day (2020 16:46)  Pepcid 20 mg oral tablet: 1 tab(s) orally once a day (2020 16:46)  Plavix 75 mg oral tablet: 1 tab(s) orally once a day (2020 16:46)  QUEtiapine 200 mg oral tablet: 1 tab(s) orally once a day (at bedtime) (2020 16:46)  tiotropium 18 mcg inhalation capsule: 1 cap(s) inhaled once a day (02 Dec 2020 13:51)    24 Hour Events:  -->    Exam:    Current Medications:  acetaminophen   Tablet .. 650 milliGRAM(s) Oral every 6 hours PRN  albuterol/ipratropium for Nebulization 3 milliLiter(s) Nebulizer every 4 hours  aspirin  chewable 81 milliGRAM(s) Oral daily  atorvastatin 20 milliGRAM(s) Oral at bedtime  budesonide 160 MICROgram(s)/formoterol 4.5 MICROgram(s) Inhaler 2 Puff(s) Inhalation two times a day  chlorhexidine 4% Liquid 1 Application(s) Topical <User Schedule>  clopidogrel Tablet 75 milliGRAM(s) Oral daily  dabigatran 150 milliGRAM(s) Oral every 12 hours  famotidine    Tablet 20 milliGRAM(s) Oral daily  gabapentin 300 milliGRAM(s) Oral three times a day  lactated ringers Bolus 250 milliLiter(s) IV Bolus once  lamoTRIgine 100 milliGRAM(s) Oral daily  LORazepam     Tablet 0.5 milliGRAM(s) Oral every 6 hours PRN  phenylephrine    Infusion 0.1 MICROgram(s)/kG/Min IV Continuous <Continuous>  QUEtiapine 200 milliGRAM(s) Oral at bedtime  tiotropium 18 MICROgram(s) Capsule 1 Capsule(s) Inhalation daily    mRS:  0 No symptoms at all  1 No significant disability despite symptoms; able to carry out all usual duties and activities without assistance  2 Slight disability; unable to carry out all previous activities, but able to look after own affairs  3 Moderate disability; requiring some help, but able to walk without assistance  4 Moderately severe disability; unable to walk without assistance and unable to attend to own bodily needs without assistance  5 Severe disability; bedridden, incontinent and requiring constant nursing care and attention  6 Dead    Vital Signs Last 24 Hrs  T(C): 37.8 (07 Dec 2020 08:00), Max: 37.8 (07 Dec 2020 08:00)  T(F): 100.1 (07 Dec 2020 08:00), Max: 100.1 (07 Dec 2020 08:00)  HR: 85 (07 Dec 2020 08:00) (60 - 85)  BP: 93/50 (06 Dec 2020 16:00) (92/50 - 110/56)  BP(mean): 68 (06 Dec 2020 16:00) (68 - 80)  RR: 20 (07 Dec 2020 06:00) (18 - 24)  SpO2: 98% (07 Dec 2020 08:00) (92% - 100%)     I/Os:  I&O's Detail    06 Dec 2020 07:01  -  07 Dec 2020 07:00  --------------------------------------------------------  IN:    Lactated Ringers Bolus: 1500 mL    Oral Fluid: 1260 mL    Phenylephrine: 215.4 mL  Total IN: 2975.4 mL    OUT:    Voided (mL): 500 mL  Total OUT: 500 mL    Total NET: 2475.4 mL                           10.1   5.74  )-----------( 172      ( 06 Dec 2020 22:12 )             32.3      12-06    140  |  109  |  22<H>  ----------------------------<  102<H>  4.1   |  22  |  0.9    Ca    8.2<L>      06 Dec 2020 22:12  Phos  2.9     12-  Mg     2.0     12-06    PT/INR - ( 06 Dec 2020 22:12 )   PT: 14.50 sec;   INR: 1.26 ratio      PTT - ( 06 Dec 2020 22:12 )  PTT:36.4 sec  Adult Advanced Hemodynamics Last 24 Hrs  CVP(mm Hg): --  CVP(cm H2O): --  CO: --  CI: --  PA: --  PA(mean): --  PCWP: --  SVR: --  SVRI: --  PVR: --  PVRI: --  CARDIAC MARKERS ( 07 Dec 2020 04:20 )  x     / <0.01 ng/mL / 24 U/L / x     / 1.4 ng/mL  CARDIAC MARKERS ( 06 Dec 2020 22:12 )  x     / <0.01 ng/mL / 36 U/L / x     / 1.7 ng/mL    Diagnostics/ Results:  Last CTH:  < from: CT Brain Stroke Protocol (20 @ 09:38) >  Impression:    No evidence of acute intracranial abnormality.    Stable mild chronic microvascular ischemic changes.    < end of copied text >    Last CTA/MRA: n/a    Last CTP:  < from: CT Perfusion w/ Maps w/ IV Cont (20 @ 10:25) >  IMPRESSION:    CT PERFUSION:  Normal perfusion images of the brain.    CTA head/neck:  Interval left carotid artery stent placement extending from the carotid bulb to the proximal ICA with intact intraluminal flow and expected postoperative changes. Mild narrowing within the stent measuring 0.3 cm.    No evidence of large vessel occlusion or aneurysm.    Redemonstration of left parotid mass.    Severe extensive emphysematous changes of the lungs.    < end of copied text >    Last MRI: < from: MR Head No Cont (20 @ 13:33) >  IMPRESSION:  No acute infarct, acute intracranial hemorrhage, or mass effect.    < end of copied text >    Last TCD: n/a    Last EEG: < from: EEG (20 @ 10:30) >  PDR  Continuous  Background: 6-7 hz    Generalized Slowing  Yes  mild - moderate    Focal Slowing  No    Breach Artifact:  No    Activation Procedure  Hyper Ventilation  No    Photic Stimulation  No    Epileptiform Activity  No    Events:  No      Impression  Technically suboptimal, but readable segments show abnormal segments  generalized slowing as above      Clinical Correlation & Recommendations  Consistent with diffuse cerebral electrophysiological dysfunction secondary to nonspecific cause.    < end of copied text >    Last Echo: < from: TTE Echo Complete w/o Contrast w/ Doppler (20 @ 08:52) >  Summary:   1. Hyperdynamic global left ventricular systolic function.   2. LV Ejection Fraction by Scott's Method with a biplane EF of 70 %.   3. Moderately increased LV wall thickness. LVOT gradient of   30 mmHg noted.   4. Spectral Doppler shows impaired relaxation pattern of left ventricular myocardial filling (Grade I diastolic dysfunction).   5. Mildly enlarged left atrium.   6.Normal right atrial size.   7. Thickening and calcification of the anterior and posterior mitral valve leaflets.   8. Trace mitral valve regurgitation.   9. Mitral annular calcification.    PHYSICIAN INTERPRETATION:  Left Ventricle: The left ventricular internal cavity size is normal. Left ventricular wall thickness is moderately increased. Global LV systolic function was hyperdynamic. Spectral Doppler shows impaired relaxation pattern of left ventricular myocardial filling (Grade I diastolic dysfunction).  Right Ventricle: Normal right ventricular size and function.  Left Atrium: Mildly enlarged left atrium.  Right Atrium: Normal right atrial size.  Pericardium: There is no evidence of pericardial effusion.  Mitral Valve: Thickening and calcification of the anterior and posterior mitral valve leaflets. There is mitral annular calcification. No evidence of mitral stenosis. Trace mitral valve regurgitation is seen.  Tricuspid Valve: Trivial tricuspid regurgitation is visualized.  Aortic Valve: No evidence of aortic stenosis. Aortic valve thickening with normal leaflet opening. Trivial aortic valve regurgitation is seen.  Pulmonic Valve: No indication of pulmonic valve regurgitation.  Aorta: The aortic root is normal in size and structure.    < end of copied text >    Last EKG: < from: 12 Lead ECG (20 @ 22:13) >  Diagnosis Line Normal sinus rhythm  limb lead reversal possible  Right axis deviation  T wave abnormality, consider lateral ischemia  Abnormal ECG    < end of copied text >    Chest Xray:  < from: Xray Chest 1 View- PORTABLE-Routine (20 @ 07:01) >  Impression:    Grossly no acute pulmonary disease in this expiratory phase exam    < end of copied text >    ROS:  [X] A ten-point ROS was otherwise negative except as noted in HPI    Impression:  -Paroxysmal A-fib  -Carotid stenosis; s/p TCAR  -CHF    Assessment:  76 yo LHF w/ h/o COPD. Depression/BPD, CHF, HTN, FLAVIO, PVD, TIA currently s/p L carotid revascularization w/ stenting currently found  with difficulty speaking without obvious signs of stroke/aphasia but in setting of asterixis possible mild encephalopathy. All diagnostics (CTH, CTP, EEG) negative for pathology.     Plan:  Neurology:  -Stroke code  for aphasia; difficult word finding   -CTP-->  No evidence of large vessel occlusion or aneurysm  -CTH-->  No evidence of acute intracranial abnormality  -MRI-->  No acute infarct, acute intracranial hemorrhage, or mass effect  -EEG-->  generalized slowing only  -Psych meds bipolar; resume from home: Seroquel/ Lamictal/ Neurontin     Respiratory:  -Aspiration precautions; keep HOB > 45  -COPD; Duoneb/ Symbicort/ Spiriva     Cardiology:  -Echo-->  Mildly enlarged left atrium.  -ASA 81mg / plavix 75mg / Pradaxa 150mg  -Phenylephrine; keep MAP 65-70    Vascular:  -Carotid stenosis; s/p L carotid revascularization w/ stenting (TCAR)   -Duplex b/l LE-->  negative for DVT    GI/Nutrition:  -Diet, Consistent Carbohydrate w/Evening Snack  -PPx -> pepcid    / Renal/ Fluids/ Electrolytes:    Hematology/ Oncology:  -DVT PPx -> Haparin SQ    Lovenox SQ    Haparin gtt    SCDs    Infectious Disease:  -Avoid fevers; tylenol PRN     Musculoskeletol:    Endocrine:  -Lipitor 20mg     Tubes/ Lines/ Drains:  Central    Peripheral    A-line    Freeman    NGT/OGT    Chest    EVD    Disposition:  Continue medical management as per primary team in:   ICU   CCU   SICU   Stroke Unit   Stepdown    CODE STATUS w/ Next of Kin: Juno (spouse) [phone]  DNI   DNR   FULL    Patient seen and case discussed with N[facility] attending; see attending attestation  Please call w/ questions  Lolly Sparks NP  [id number]             Neurocritical Care Progress Note    SHAUN COATES    75y     [id number]   Daily Height in cm: 157.5 (06 Dec 2020 10:03)    Daily Weight in k.3 (07 Dec 2020 03:20)  COVID-19 PCR: NotDetec (2020 13:30)  SARS-CoV-2: NotDetec (14 Oct 2020 18:06)    Patient is a 75y old  Female who presents with a chief complaint of Odynophagia associated with chest pain (07 Dec 2020 08:10)    HPI:  Chief Complaint: Chest pain after swallowing     Past Medical History: COPD on 5L home O2, paroxysmal afib, carotid stenosis, HFpEF, HTN, HLD, Bipolar disorder    Past Surgical History: None relevant     History of present illness goes back to 6AM on the morning of presentation when the patient first noticed chest pain that was associated with swallowing. She states that she was in her usual state of health the night prior. Of note, she lives with her daughter who is currently in their home quarantining, but the patient states her daughter is not doing a good job and was concerned that she might have COVID, so she went to urgent care and got tested, results pending. Her chest pain was described as severe when she swallowed, radiating to her neck and shoulder so she called EMS and was brought to the ED.      In the ED, her vitals were normal. EMS reported that in transit there was some anterior leads that showed TWI, but repeat EKG in the ED did not show any changes. CXR did however show a LLL opacity, increased from last month's CXR representing a possible developing pneumonia. She also wanted to come to the ED because she is planning to undergo a carotid endarterectomy on 12/15 and was concerned that uf she was ill, she would need to postpone the procedure. Rapid COVID was negative, PCR pending. Admitted by ED for ACS rule-out.     ROS: Denies headache, changes in vision, chest pain, palpitations, shortness of breath, cough, fever, chills, nausea, vomiting, diarrhea, and constipation.  (2020 16:06)    Allergies:  codeine (Other (Moderate))  Depakote (Unknown)  Dilaudid (Short breath; Rash)  IV Contrast (Anaphylaxis)  losartan (Angioedema)  Risperdal (Other)  verapamil (Short breath; Angioedema)    PAST MEDICAL & SURGICAL HISTORY:  Falls    Congestive heart failure    Transient ischemic attack    FLAVIO on CPAP    Bipolar 1 disorder    Allergic reaction    PVD (peripheral vascular disease)    Other emphysema    Other cardiomyopathy    TIA (transient ischemic attack)    COPD (chronic obstructive pulmonary disease)    Depression    Hypertension    History of cholecystectomy    Home Medications:  amLODIPine 2.5 mg oral tablet: 1 tab(s) orally once a day (2020 16:46)  aspirin 81 mg oral tablet: 1 tab(s) orally once a day (2020 16:46)  atorvastatin 20 mg oral tablet: 1 tab(s) orally once a day (2020 16:46)  budesonide-formoterol 160 mcg-4.5 mcg/inh inhalation aerosol: 2 puff(s) inhaled 2 times a day (:46)  furosemide 40 mg oral tablet: 1 tab(s) orally once a day (2020 16:46)  lamoTRIgine 100 mg oral tablet, extended release: 1 tab(s) orally once a day (2020 16:46)  LORazepam 0.5 mg oral tablet: 1 tab(s) orally 2 times a day, As Needed (2020 16:46)  Metoprolol Tartrate 100 mg oral tablet: 1 tab(s) orally 2 times a day (2020 16:46)  montelukast 10 mg oral tablet: 1 tab(s) orally once a day (2020 16:46)  Pepcid 20 mg oral tablet: 1 tab(s) orally once a day (2020 16:46)  Plavix 75 mg oral tablet: 1 tab(s) orally once a day (2020 16:46)  QUEtiapine 200 mg oral tablet: 1 tab(s) orally once a day (at bedtime) (2020 16:46)  tiotropium 18 mcg inhalation capsule: 1 cap(s) inhaled once a day (02 Dec 2020 13:51)    24 Hour Events:  --> Patient with expressive aphasia. All diagnostics negative for pathology, although possible stroke seen on DWI/MRI. Continue to follow. No change in plan.     Exam:  Neurologic Exam:  Mental status: Awake, alert and oriented to person, place, and time. Attention and concentration intact. Fund of knowledge appropriate  Language: Naming, repetition, fluency, and comprehension intact. No dysarthria, expressive aphasia noted  Cranial nerves: Pupils equally round and reactive to light. Visual fields full. No nystagmus. Extraocular muscles intact, V1 through V3 intact bilaterally and symmetric,. Right facial appreciated. Hearing intact   Motor:  Normal bulk and tone. Strength:    4/5 in RUE  5/5 in LUE  2/5 in RLE  5/5 in LLE   strength 5/5  Motor: Rapid alternating movements intact and symmetric. Tremors/asterixis noted UE b/l w/ tremulous jaw that is new  Sensation: Intact to light touch, proprioception, and noxious stimuli. No neglect noted  Coordination: No dysmetria on finger-to-nose and heel-to-shin. No clumsiness noted  Reflexes: 2+ generally     NIH STROKE SCALE  Item	                                                        Score  1 a.	Level of Consciousness	               	0  1 b. LOC Questions	                                0  1 c.	LOC Commands	                               	0  2.	Best Gaze	                                        0  3.	Visual	                                                0  4.	Facial Palsy	                                        1  5 a.	Motor Arm - Left	                                0  5 b.	Motor Arm - Right	                        1  6 a.	Motor Leg - Left	                                0  6 b.	Motor Leg - Right	                                3  7.	Limb Ataxia	                                        0  8.	Sensory	                                                0  9.	Language	                                        1  10.	Dysarthria	                                        0  11.	Extinction and Inattention  	        0  ______________________________________  TOTAL	                                                        0    Admitted score:             Yesterdays score:          Todays score:  6    Current Medications:  acetaminophen   Tablet .. 650 milliGRAM(s) Oral every 6 hours PRN  albuterol/ipratropium for Nebulization 3 milliLiter(s) Nebulizer every 4 hours  aspirin  chewable 81 milliGRAM(s) Oral daily  atorvastatin 20 milliGRAM(s) Oral at bedtime  budesonide 160 MICROgram(s)/formoterol 4.5 MICROgram(s) Inhaler 2 Puff(s) Inhalation two times a day  chlorhexidine 4% Liquid 1 Application(s) Topical <User Schedule>  clopidogrel Tablet 75 milliGRAM(s) Oral daily  dabigatran 150 milliGRAM(s) Oral every 12 hours  famotidine    Tablet 20 milliGRAM(s) Oral daily  gabapentin 300 milliGRAM(s) Oral three times a day  lactated ringers Bolus 250 milliLiter(s) IV Bolus once  lamoTRIgine 100 milliGRAM(s) Oral daily  LORazepam     Tablet 0.5 milliGRAM(s) Oral every 6 hours PRN  phenylephrine    Infusion 0.1 MICROgram(s)/kG/Min IV Continuous <Continuous>  QUEtiapine 200 milliGRAM(s) Oral at bedtime  tiotropium 18 MICROgram(s) Capsule 1 Capsule(s) Inhalation daily    mRS:  0 No symptoms at all  1 No significant disability despite symptoms; able to carry out all usual duties and activities without assistance  2 Slight disability; unable to carry out all previous activities, but able to look after own affairs  3 Moderate disability; requiring some help, but able to walk without assistance  4 Moderately severe disability; unable to walk without assistance and unable to attend to own bodily needs without assistance  5 Severe disability; bedridden, incontinent and requiring constant nursing care and attention  6 Dead    Vital Signs Last 24 Hrs  T(C): 37.8 (07 Dec 2020 08:00), Max: 37.8 (07 Dec 2020 08:00)  T(F): 100.1 (07 Dec 2020 08:00), Max: 100.1 (07 Dec 2020 08:00)  HR: 85 (07 Dec 2020 08:00) (60 - 85)  BP: 93/50 (06 Dec 2020 16:00) (92/50 - 110/56)  BP(mean): 68 (06 Dec 2020 16:00) (68 - 80)  RR: 20 (07 Dec 2020 06:00) (18 - 24)  SpO2: 98% (07 Dec 2020 08:00) (92% - 100%)     I/Os:  I&O's Detail    06 Dec 2020 07:01  -  07 Dec 2020 07:00  --------------------------------------------------------  IN:    Lactated Ringers Bolus: 1500 mL    Oral Fluid: 1260 mL    Phenylephrine: 215.4 mL  Total IN: 2975.4 mL    OUT:    Voided (mL): 500 mL  Total OUT: 500 mL    Total NET: 2475.4 mL                           10.1   5.74  )-----------( 172      ( 06 Dec 2020 22:12 )             32.3      12-06    140  |  109  |  22<H>  ----------------------------<  102<H>  4.1   |  22  |  0.9    Ca    8.2<L>      06 Dec 2020 22:12  Phos  2.9     12-  Mg     2.0     12-    PT/INR - ( 06 Dec 2020 22:12 )   PT: 14.50 sec;   INR: 1.26 ratio      PTT - ( 06 Dec 2020 22:12 )  PTT:36.4 sec  Adult Advanced Hemodynamics Last 24 Hrs  CVP(mm Hg): --  CVP(cm H2O): --  CO: --  CI: --  PA: --  PA(mean): --  PCWP: --  SVR: --  SVRI: --  PVR: --  PVRI: --  CARDIAC MARKERS ( 07 Dec 2020 04:20 )  x     / <0.01 ng/mL / 24 U/L / x     / 1.4 ng/mL  CARDIAC MARKERS ( 06 Dec 2020 22:12 )  x     / <0.01 ng/mL / 36 U/L / x     / 1.7 ng/mL    Diagnostics/ Results:  Last CTH:  < from: CT Brain Stroke Protocol (20 @ 09:38) >  Impression:    No evidence of acute intracranial abnormality.    Stable mild chronic microvascular ischemic changes.    < end of copied text >    Last CTA/MRA: n/a    Last CTP:  < from: CT Perfusion w/ Maps w/ IV Cont (20 @ 10:25) >  IMPRESSION:    CT PERFUSION:  Normal perfusion images of the brain.    CTA head/neck:  Interval left carotid artery stent placement extending from the carotid bulb to the proximal ICA with intact intraluminal flow and expected postoperative changes. Mild narrowing within the stent measuring 0.3 cm.    No evidence of large vessel occlusion or aneurysm.    Redemonstration of left parotid mass.    Severe extensive emphysematous changes of the lungs.    < end of copied text >    Last MRI: < from: MR Head No Cont (20 @ 13:33) >  IMPRESSION:  No acute infarct, acute intracranial hemorrhage, or mass effect.    < end of copied text >    Last TCD: n/a    Last EEG: < from: EEG (20 @ 10:30) >  PDR  Continuous  Background: 6-7 hz    Generalized Slowing  Yes  mild - moderate    Focal Slowing  No    Breach Artifact:  No    Activation Procedure  Hyper Ventilation  No    Photic Stimulation  No    Epileptiform Activity  No    Events:  No      Impression  Technically suboptimal, but readable segments show abnormal segments  generalized slowing as above      Clinical Correlation & Recommendations  Consistent with diffuse cerebral electrophysiological dysfunction secondary to nonspecific cause.    < end of copied text >    Last Echo: < from: TTE Echo Complete w/o Contrast w/ Doppler (20 @ 08:52) >  Summary:   1. Hyperdynamic global left ventricular systolic function.   2. LV Ejection Fraction by Scott's Method with a biplane EF of 70 %.   3. Moderately increased LV wall thickness. LVOT gradient of   30 mmHg noted.   4. Spectral Doppler shows impaired relaxation pattern of left ventricular myocardial filling (Grade I diastolic dysfunction).   5. Mildly enlarged left atrium.   6.Normal right atrial size.   7. Thickening and calcification of the anterior and posterior mitral valve leaflets.   8. Trace mitral valve regurgitation.   9. Mitral annular calcification.    PHYSICIAN INTERPRETATION:  Left Ventricle: The left ventricular internal cavity size is normal. Left ventricular wall thickness is moderately increased. Global LV systolic function was hyperdynamic. Spectral Doppler shows impaired relaxation pattern of left ventricular myocardial filling (Grade I diastolic dysfunction).  Right Ventricle: Normal right ventricular size and function.  Left Atrium: Mildly enlarged left atrium.  Right Atrium: Normal right atrial size.  Pericardium: There is no evidence of pericardial effusion.  Mitral Valve: Thickening and calcification of the anterior and posterior mitral valve leaflets. There is mitral annular calcification. No evidence of mitral stenosis. Trace mitral valve regurgitation is seen.  Tricuspid Valve: Trivial tricuspid regurgitation is visualized.  Aortic Valve: No evidence of aortic stenosis. Aortic valve thickening with normal leaflet opening. Trivial aortic valve regurgitation is seen.  Pulmonic Valve: No indication of pulmonic valve regurgitation.  Aorta: The aortic root is normal in size and structure.    < end of copied text >    Last EKG: < from: 12 Lead ECG (20 @ 22:13) >  Diagnosis Line Normal sinus rhythm  limb lead reversal possible  Right axis deviation  T wave abnormality, consider lateral ischemia  Abnormal ECG    < end of copied text >    Chest Xray:  < from: Xray Chest 1 View- PORTABLE-Routine (20 @ 07:01) >  Impression:    Grossly no acute pulmonary disease in this expiratory phase exam    < end of copied text >    ROS:  [X] A ten-point ROS was otherwise negative except as noted in HPI    Impression:  -Paroxysmal A-fib  -Carotid stenosis; s/p TCAR  -Stroke in the setting of TCAR  -CHF    Assessment:  76 yo LHF w/ h/o COPD. Depression/BPD, CHF, HTN, FLAVIO, PVD, TIA currently s/p L carotid revascularization w/ stenting currently found on  with difficulty speaking without obvious signs of stroke, but in setting of asterixis possible mild encephalopathy. All diagnostics (CTH, CTP, EEG) negative for pathology. Continues to have expressive aphasia. DWI on MRI with possible stroke, most likely in the setting of TCAR. Will continue to follow. No change in management or plan at this time. Continue DAPT, psych meds, and plan as per vascular.     Plan:  Neurology:  -Stroke code  for aphasia; difficult word finding   -CTP-->  No evidence of large vessel occlusion or aneurysm  -CTH-->  No evidence of acute intracranial abnormality  -MRI-->  No acute infarct, acute intracranial hemorrhage, or mass effect  -EEG-->  generalized slowing only  -Psych meds bipolar; resume from home: Seroquel/ Lamictal/ Neurontin     Respiratory:  -Aspiration precautions; keep HOB > 45  -COPD; Duoneb/ Symbicort/ Spiriva     Cardiology:  -Echo-->  Mildly enlarged left atrium.  -ASA 81mg / plavix 75mg / Pradaxa 150mg  -Phenylephrine; keep MAP 65-70    Vascular:  -Carotid stenosis; s/p L carotid revascularization w/ stenting (TCAR)   -Duplex b/l LE-->  negative for DVT    GI/Nutrition:  -Diet, Consistent Carbohydrate w/Evening Snack  -PPx -> pepcid    / Renal/ Fluids/ Electrolytes:  -Keep hydrated. Monitor lytes and correct PRN     Hematology/ Oncology:  -DVT PPx -> Haparin SQ    Lovenox SQ    Haparin gtt    SCDs    Infectious Disease:  -Avoid fevers; tylenol PRN     Musculoskeletol:  -OOB--> chair    Endocrine:  -Lipitor 20mg     Tubes/ Lines/ Drains:  Central    Peripheral    A-line    Freeman    NGT/OGT    Chest    EVD    Disposition:  Continue medical management as per primary team in:   ICU   CCU   SICU   Stroke Unit   Stepdown    CODE STATUS w/ Next of Kin: Juno (spouse) [phone]  DNI   DNR   FULL    Patient seen and case discussed with NCC attending; see attending attestation  Please call w/ questions  Lolly Sparks NP  [phone]             Neurocritical Care Progress Note    SHAUN COATES    75y     [id number]   Daily Height in cm: 157.5 (06 Dec 2020 10:03)    Daily Weight in k.3 (07 Dec 2020 03:20)  COVID-19 PCR: NotDetec (2020 13:30)  SARS-CoV-2: NotDetec (14 Oct 2020 18:06)    Patient is a 75y old  Female who presents with a chief complaint of Odynophagia associated with chest pain (07 Dec 2020 08:10)    HPI:  Chief Complaint: Chest pain after swallowing     Past Medical History: COPD on 5L home O2, paroxysmal afib, carotid stenosis, HFpEF, HTN, HLD, Bipolar disorder    Past Surgical History: None relevant     History of present illness goes back to 6AM on the morning of presentation when the patient first noticed chest pain that was associated with swallowing. She states that she was in her usual state of health the night prior. Of note, she lives with her daughter who is currently in their home quarantining, but the patient states her daughter is not doing a good job and was concerned that she might have COVID, so she went to urgent care and got tested, results pending. Her chest pain was described as severe when she swallowed, radiating to her neck and shoulder so she called EMS and was brought to the ED.      In the ED, her vitals were normal. EMS reported that in transit there was some anterior leads that showed TWI, but repeat EKG in the ED did not show any changes. CXR did however show a LLL opacity, increased from last month's CXR representing a possible developing pneumonia. She also wanted to come to the ED because she is planning to undergo a carotid endarterectomy on 12/15 and was concerned that uf she was ill, she would need to postpone the procedure. Rapid COVID was negative, PCR pending. Admitted by ED for ACS rule-out.     ROS: Denies headache, changes in vision, chest pain, palpitations, shortness of breath, cough, fever, chills, nausea, vomiting, diarrhea, and constipation.  (2020 16:06)    Allergies:  codeine (Other (Moderate))  Depakote (Unknown)  Dilaudid (Short breath; Rash)  IV Contrast (Anaphylaxis)  losartan (Angioedema)  Risperdal (Other)  verapamil (Short breath; Angioedema)    PAST MEDICAL & SURGICAL HISTORY:  Falls    Congestive heart failure    Transient ischemic attack    FLAVIO on CPAP    Bipolar 1 disorder    Allergic reaction    PVD (peripheral vascular disease)    Other emphysema    Other cardiomyopathy    TIA (transient ischemic attack)    COPD (chronic obstructive pulmonary disease)    Depression    Hypertension    History of cholecystectomy    Home Medications:  amLODIPine 2.5 mg oral tablet: 1 tab(s) orally once a day (2020 16:46)  aspirin 81 mg oral tablet: 1 tab(s) orally once a day (2020 16:46)  atorvastatin 20 mg oral tablet: 1 tab(s) orally once a day (2020 16:46)  budesonide-formoterol 160 mcg-4.5 mcg/inh inhalation aerosol: 2 puff(s) inhaled 2 times a day (:46)  furosemide 40 mg oral tablet: 1 tab(s) orally once a day (2020 16:46)  lamoTRIgine 100 mg oral tablet, extended release: 1 tab(s) orally once a day (2020 16:46)  LORazepam 0.5 mg oral tablet: 1 tab(s) orally 2 times a day, As Needed (2020 16:46)  Metoprolol Tartrate 100 mg oral tablet: 1 tab(s) orally 2 times a day (2020 16:46)  montelukast 10 mg oral tablet: 1 tab(s) orally once a day (2020 16:46)  Pepcid 20 mg oral tablet: 1 tab(s) orally once a day (2020 16:46)  Plavix 75 mg oral tablet: 1 tab(s) orally once a day (2020 16:46)  QUEtiapine 200 mg oral tablet: 1 tab(s) orally once a day (at bedtime) (2020 16:46)  tiotropium 18 mcg inhalation capsule: 1 cap(s) inhaled once a day (02 Dec 2020 13:51)    24 Hour Events:  --> Patient with expressive aphasia. All diagnostics negative for pathology. Continue to follow. No change in plan.     Exam:  Neurologic Exam:  Mental status: Awake, alert and oriented to person, place, and time. Attention and concentration intact. Fund of knowledge appropriate  Language: Naming, repetition, fluency, and comprehension intact. No dysarthria, expressive aphasia noted  Cranial nerves: Pupils equally round and reactive to light. Visual fields full. No nystagmus. Extraocular muscles intact, V1 through V3 intact bilaterally and symmetric,. Right facial appreciated. Hearing intact   Motor:  Normal bulk and tone. Strength:    4/5 in RUE  5/5 in LUE  2/5 in RLE  5/5 in LLE   strength 5/5  Motor: Rapid alternating movements intact and symmetric. Tremors/asterixis noted UE b/l w/ tremulous jaw that is new  Sensation: Intact to light touch, proprioception, and noxious stimuli. No neglect noted  Coordination: No dysmetria on finger-to-nose and heel-to-shin. No clumsiness noted  Reflexes: 2+ generally     NIH STROKE SCALE  Item	                                                        Score  1 a.	Level of Consciousness	               	0  1 b. LOC Questions	                                0  1 c.	LOC Commands	                               	0  2.	Best Gaze	                                        0  3.	Visual	                                                0  4.	Facial Palsy	                                        1  5 a.	Motor Arm - Left	                                0  5 b.	Motor Arm - Right	                        1  6 a.	Motor Leg - Left	                                0  6 b.	Motor Leg - Right	                                3  7.	Limb Ataxia	                                        0  8.	Sensory	                                                0  9.	Language	                                        1  10.	Dysarthria	                                        0  11.	Extinction and Inattention  	        0  ______________________________________  TOTAL	                                                        0    Admitted score:             Yesterdays score:          Todays score:  6    Current Medications:  acetaminophen   Tablet .. 650 milliGRAM(s) Oral every 6 hours PRN  albuterol/ipratropium for Nebulization 3 milliLiter(s) Nebulizer every 4 hours  aspirin  chewable 81 milliGRAM(s) Oral daily  atorvastatin 20 milliGRAM(s) Oral at bedtime  budesonide 160 MICROgram(s)/formoterol 4.5 MICROgram(s) Inhaler 2 Puff(s) Inhalation two times a day  chlorhexidine 4% Liquid 1 Application(s) Topical <User Schedule>  clopidogrel Tablet 75 milliGRAM(s) Oral daily  dabigatran 150 milliGRAM(s) Oral every 12 hours  famotidine    Tablet 20 milliGRAM(s) Oral daily  gabapentin 300 milliGRAM(s) Oral three times a day  lactated ringers Bolus 250 milliLiter(s) IV Bolus once  lamoTRIgine 100 milliGRAM(s) Oral daily  LORazepam     Tablet 0.5 milliGRAM(s) Oral every 6 hours PRN  phenylephrine    Infusion 0.1 MICROgram(s)/kG/Min IV Continuous <Continuous>  QUEtiapine 200 milliGRAM(s) Oral at bedtime  tiotropium 18 MICROgram(s) Capsule 1 Capsule(s) Inhalation daily    mRS:  0 No symptoms at all  1 No significant disability despite symptoms; able to carry out all usual duties and activities without assistance  2 Slight disability; unable to carry out all previous activities, but able to look after own affairs  3 Moderate disability; requiring some help, but able to walk without assistance  4 Moderately severe disability; unable to walk without assistance and unable to attend to own bodily needs without assistance  5 Severe disability; bedridden, incontinent and requiring constant nursing care and attention  6 Dead    Vital Signs Last 24 Hrs  T(C): 37.8 (07 Dec 2020 08:00), Max: 37.8 (07 Dec 2020 08:00)  T(F): 100.1 (07 Dec 2020 08:00), Max: 100.1 (07 Dec 2020 08:00)  HR: 85 (07 Dec 2020 08:00) (60 - 85)  BP: 93/50 (06 Dec 2020 16:00) (92/50 - 110/56)  BP(mean): 68 (06 Dec 2020 16:00) (68 - 80)  RR: 20 (07 Dec 2020 06:00) (18 - 24)  SpO2: 98% (07 Dec 2020 08:00) (92% - 100%)     I/Os:  I&O's Detail    06 Dec 2020 07:01  -  07 Dec 2020 07:00  --------------------------------------------------------  IN:    Lactated Ringers Bolus: 1500 mL    Oral Fluid: 1260 mL    Phenylephrine: 215.4 mL  Total IN: 2975.4 mL    OUT:    Voided (mL): 500 mL  Total OUT: 500 mL    Total NET: 2475.4 mL                           10.1   5.74  )-----------( 172      ( 06 Dec 2020 22:12 )             32.3      12-06    140  |  109  |  22<H>  ----------------------------<  102<H>  4.1   |  22  |  0.9    Ca    8.2<L>      06 Dec 2020 22:12  Phos  2.9     12-  Mg     2.0     12-06    PT/INR - ( 06 Dec 2020 22:12 )   PT: 14.50 sec;   INR: 1.26 ratio      PTT - ( 06 Dec 2020 22:12 )  PTT:36.4 sec  Adult Advanced Hemodynamics Last 24 Hrs  CVP(mm Hg): --  CVP(cm H2O): --  CO: --  CI: --  PA: --  PA(mean): --  PCWP: --  SVR: --  SVRI: --  PVR: --  PVRI: --  CARDIAC MARKERS ( 07 Dec 2020 04:20 )  x     / <0.01 ng/mL / 24 U/L / x     / 1.4 ng/mL  CARDIAC MARKERS ( 06 Dec 2020 22:12 )  x     / <0.01 ng/mL / 36 U/L / x     / 1.7 ng/mL    Diagnostics/ Results:  Last CTH:  < from: CT Brain Stroke Protocol (20 @ 09:38) >  Impression:    No evidence of acute intracranial abnormality.    Stable mild chronic microvascular ischemic changes.    < end of copied text >    Last CTA/MRA: n/a    Last CTP:  < from: CT Perfusion w/ Maps w/ IV Cont (20 @ 10:25) >  IMPRESSION:    CT PERFUSION:  Normal perfusion images of the brain.    CTA head/neck:  Interval left carotid artery stent placement extending from the carotid bulb to the proximal ICA with intact intraluminal flow and expected postoperative changes. Mild narrowing within the stent measuring 0.3 cm.    No evidence of large vessel occlusion or aneurysm.    Redemonstration of left parotid mass.    Severe extensive emphysematous changes of the lungs.    < end of copied text >    Last MRI: < from: MR Head No Cont (20 @ 13:33) >  IMPRESSION:  No acute infarct, acute intracranial hemorrhage, or mass effect.    < end of copied text >    Last TCD: n/a    Last EEG: < from: EEG (20 @ 10:30) >  PDR  Continuous  Background: 6-7 hz    Generalized Slowing  Yes  mild - moderate    Focal Slowing  No    Breach Artifact:  No    Activation Procedure  Hyper Ventilation  No    Photic Stimulation  No    Epileptiform Activity  No    Events:  No      Impression  Technically suboptimal, but readable segments show abnormal segments  generalized slowing as above      Clinical Correlation & Recommendations  Consistent with diffuse cerebral electrophysiological dysfunction secondary to nonspecific cause.    < end of copied text >    Last Echo: < from: TTE Echo Complete w/o Contrast w/ Doppler (20 @ 08:52) >  Summary:   1. Hyperdynamic global left ventricular systolic function.   2. LV Ejection Fraction by Scott's Method with a biplane EF of 70 %.   3. Moderately increased LV wall thickness. LVOT gradient of   30 mmHg noted.   4. Spectral Doppler shows impaired relaxation pattern of left ventricular myocardial filling (Grade I diastolic dysfunction).   5. Mildly enlarged left atrium.   6.Normal right atrial size.   7. Thickening and calcification of the anterior and posterior mitral valve leaflets.   8. Trace mitral valve regurgitation.   9. Mitral annular calcification.    PHYSICIAN INTERPRETATION:  Left Ventricle: The left ventricular internal cavity size is normal. Left ventricular wall thickness is moderately increased. Global LV systolic function was hyperdynamic. Spectral Doppler shows impaired relaxation pattern of left ventricular myocardial filling (Grade I diastolic dysfunction).  Right Ventricle: Normal right ventricular size and function.  Left Atrium: Mildly enlarged left atrium.  Right Atrium: Normal right atrial size.  Pericardium: There is no evidence of pericardial effusion.  Mitral Valve: Thickening and calcification of the anterior and posterior mitral valve leaflets. There is mitral annular calcification. No evidence of mitral stenosis. Trace mitral valve regurgitation is seen.  Tricuspid Valve: Trivial tricuspid regurgitation is visualized.  Aortic Valve: No evidence of aortic stenosis. Aortic valve thickening with normal leaflet opening. Trivial aortic valve regurgitation is seen.  Pulmonic Valve: No indication of pulmonic valve regurgitation.  Aorta: The aortic root is normal in size and structure.    < end of copied text >    Last EKG: < from: 12 Lead ECG (20 @ 22:13) >  Diagnosis Line Normal sinus rhythm  limb lead reversal possible  Right axis deviation  T wave abnormality, consider lateral ischemia  Abnormal ECG    < end of copied text >    Chest Xray:  < from: Xray Chest 1 View- PORTABLE-Routine (20 @ 07:01) >  Impression:    Grossly no acute pulmonary disease in this expiratory phase exam    < end of copied text >    ROS:  [X] A ten-point ROS was otherwise negative except as noted in HPI    Impression:  -Paroxysmal A-fib  -Carotid stenosis; s/p TCAR  -Stroke in the setting of TCAR  -CHF    Assessment:  76 yo LHF w/ h/o COPD. Depression/BPD, CHF, HTN, FLAVIO, PVD, TIA currently s/p L carotid revascularization w/ stenting currently found on  with difficulty speaking without obvious signs of stroke, but in setting of asterixis possible mild encephalopathy. All diagnostics (CTH, CTP, EEG) negative for pathology. Continues to have expressive aphasia. Possible stroke, most likely in the setting of TCAR. Will continue to follow. No change in management or plan at this time. Continue DAPT, psych meds, and plan as per vascular.     Plan:  Neurology:  -Stroke code  for aphasia; difficult word finding   -CTP-->  No evidence of large vessel occlusion or aneurysm  -CTH-->  No evidence of acute intracranial abnormality  -MRI-->  No acute infarct, acute intracranial hemorrhage, or mass effect  -EEG-->  generalized slowing only  -Psych meds bipolar; resume from home: Seroquel/ Lamictal/ Neurontin     Respiratory:  -Aspiration precautions; keep HOB > 45  -COPD; Duoneb/ Symbicort/ Spiriva     Cardiology:  -Echo-->  Mildly enlarged left atrium.  -ASA 81mg / plavix 75mg / Pradaxa 150mg  -Phenylephrine; keep MAP 65-70    Vascular:  -Carotid stenosis; s/p L carotid revascularization w/ stenting (TCAR)   -Duplex b/l LE-->  negative for DVT    GI/Nutrition:  -Diet, Consistent Carbohydrate w/Evening Snack  -PPx -> pepcid    / Renal/ Fluids/ Electrolytes:  -Keep hydrated. Monitor lytes and correct PRN     Hematology/ Oncology:  -DVT PPx -> Haparin SQ    Lovenox SQ    Haparin gtt    SCDs    Infectious Disease:  -Avoid fevers; tylenol PRN     Musculoskeletol:  -OOB--> chair    Endocrine:  -Lipitor 20mg     Tubes/ Lines/ Drains:  Central    Peripheral    A-line    Freeman    NGT/OGT    Chest    EVD    Disposition:  Continue medical management as per primary team in:   ICU   CCU   SICU   Stroke Unit   Stepdown    CODE STATUS w/ Next of Kin: Juno (spouse) [phone]  DNI   DNR   FULL    Patient seen and case discussed with NCC attending; see attending attestation  Please call w/ questions  Lolly Sparks NP  [phone]

## 2020-12-08 LAB
ANION GAP SERPL CALC-SCNC: 11 MMOL/L — SIGNIFICANT CHANGE UP (ref 7–14)
ANION GAP SERPL CALC-SCNC: 9 MMOL/L — SIGNIFICANT CHANGE UP (ref 7–14)
APTT BLD: 40.6 SEC — HIGH (ref 27–39.2)
BLD GP AB SCN SERPL QL: SIGNIFICANT CHANGE UP
BUN SERPL-MCNC: 13 MG/DL — SIGNIFICANT CHANGE UP (ref 10–20)
BUN SERPL-MCNC: 15 MG/DL — SIGNIFICANT CHANGE UP (ref 10–20)
CALCIUM SERPL-MCNC: 7.9 MG/DL — LOW (ref 8.5–10.1)
CALCIUM SERPL-MCNC: 8.3 MG/DL — LOW (ref 8.5–10.1)
CHLORIDE SERPL-SCNC: 108 MMOL/L — SIGNIFICANT CHANGE UP (ref 98–110)
CHLORIDE SERPL-SCNC: 108 MMOL/L — SIGNIFICANT CHANGE UP (ref 98–110)
CO2 SERPL-SCNC: 19 MMOL/L — SIGNIFICANT CHANGE UP (ref 17–32)
CO2 SERPL-SCNC: 22 MMOL/L — SIGNIFICANT CHANGE UP (ref 17–32)
CREAT SERPL-MCNC: 0.9 MG/DL — SIGNIFICANT CHANGE UP (ref 0.7–1.5)
CREAT SERPL-MCNC: 0.9 MG/DL — SIGNIFICANT CHANGE UP (ref 0.7–1.5)
GLUCOSE BLDC GLUCOMTR-MCNC: 84 MG/DL — SIGNIFICANT CHANGE UP (ref 70–99)
GLUCOSE SERPL-MCNC: 96 MG/DL — SIGNIFICANT CHANGE UP (ref 70–99)
GLUCOSE SERPL-MCNC: 96 MG/DL — SIGNIFICANT CHANGE UP (ref 70–99)
HCT VFR BLD CALC: 27.3 % — LOW (ref 37–47)
HGB BLD-MCNC: 8.9 G/DL — LOW (ref 12–16)
INR BLD: 1.5 RATIO — HIGH (ref 0.65–1.3)
LACTATE SERPL-SCNC: 1 MMOL/L — SIGNIFICANT CHANGE UP (ref 0.7–2)
MAGNESIUM SERPL-MCNC: 1.8 MG/DL — SIGNIFICANT CHANGE UP (ref 1.8–2.4)
MCHC RBC-ENTMCNC: 31.9 PG — HIGH (ref 27–31)
MCHC RBC-ENTMCNC: 32.6 G/DL — SIGNIFICANT CHANGE UP (ref 32–37)
MCV RBC AUTO: 97.8 FL — SIGNIFICANT CHANGE UP (ref 81–99)
NRBC # BLD: 0 /100 WBCS — SIGNIFICANT CHANGE UP (ref 0–0)
PHOSPHATE SERPL-MCNC: 3.4 MG/DL — SIGNIFICANT CHANGE UP (ref 2.1–4.9)
PLATELET # BLD AUTO: 160 K/UL — SIGNIFICANT CHANGE UP (ref 130–400)
POTASSIUM SERPL-MCNC: 3.9 MMOL/L — SIGNIFICANT CHANGE UP (ref 3.5–5)
POTASSIUM SERPL-MCNC: 4.2 MMOL/L — SIGNIFICANT CHANGE UP (ref 3.5–5)
POTASSIUM SERPL-SCNC: 3.9 MMOL/L — SIGNIFICANT CHANGE UP (ref 3.5–5)
POTASSIUM SERPL-SCNC: 4.2 MMOL/L — SIGNIFICANT CHANGE UP (ref 3.5–5)
PROCALCITONIN SERPL-MCNC: 0.09 NG/ML — SIGNIFICANT CHANGE UP (ref 0.02–0.1)
PROTHROM AB SERPL-ACNC: 17.2 SEC — HIGH (ref 9.95–12.87)
RBC # BLD: 2.79 M/UL — LOW (ref 4.2–5.4)
RBC # FLD: 13.6 % — SIGNIFICANT CHANGE UP (ref 11.5–14.5)
SARS-COV-2 RNA SPEC QL NAA+PROBE: DETECTED
SODIUM SERPL-SCNC: 138 MMOL/L — SIGNIFICANT CHANGE UP (ref 135–146)
SODIUM SERPL-SCNC: 139 MMOL/L — SIGNIFICANT CHANGE UP (ref 135–146)
TSH SERPL-MCNC: 0.48 UIU/ML — SIGNIFICANT CHANGE UP (ref 0.27–4.2)
VANCOMYCIN TROUGH SERPL-MCNC: 16.2 UG/ML — HIGH (ref 5–10)
WBC # BLD: 4.22 K/UL — LOW (ref 4.8–10.8)
WBC # FLD AUTO: 4.22 K/UL — LOW (ref 4.8–10.8)

## 2020-12-08 PROCEDURE — 71045 X-RAY EXAM CHEST 1 VIEW: CPT | Mod: 26

## 2020-12-08 PROCEDURE — 99233 SBSQ HOSP IP/OBS HIGH 50: CPT

## 2020-12-08 PROCEDURE — 99291 CRITICAL CARE FIRST HOUR: CPT

## 2020-12-08 RX ORDER — DEXAMETHASONE 0.5 MG/5ML
6 ELIXIR ORAL DAILY
Refills: 0 | Status: DISCONTINUED | OUTPATIENT
Start: 2020-12-08 | End: 2020-12-13

## 2020-12-08 RX ORDER — SODIUM CHLORIDE 9 MG/ML
250 INJECTION, SOLUTION INTRAVENOUS ONCE
Refills: 0 | Status: COMPLETED | OUTPATIENT
Start: 2020-12-08 | End: 2020-12-08

## 2020-12-08 RX ORDER — MAGNESIUM SULFATE 500 MG/ML
1 VIAL (ML) INJECTION ONCE
Refills: 0 | Status: COMPLETED | OUTPATIENT
Start: 2020-12-08 | End: 2020-12-08

## 2020-12-08 RX ORDER — REMDESIVIR 5 MG/ML
100 INJECTION INTRAVENOUS EVERY 24 HOURS
Refills: 0 | Status: COMPLETED | OUTPATIENT
Start: 2020-12-09 | End: 2020-12-12

## 2020-12-08 RX ORDER — PHENYLEPHRINE HYDROCHLORIDE 10 MG/ML
0.1 INJECTION INTRAVENOUS
Qty: 40 | Refills: 0 | Status: DISCONTINUED | OUTPATIENT
Start: 2020-12-08 | End: 2020-12-08

## 2020-12-08 RX ORDER — SODIUM CHLORIDE 9 MG/ML
500 INJECTION, SOLUTION INTRAVENOUS ONCE
Refills: 0 | Status: COMPLETED | OUTPATIENT
Start: 2020-12-08 | End: 2020-12-08

## 2020-12-08 RX ORDER — REMDESIVIR 5 MG/ML
200 INJECTION INTRAVENOUS ONCE
Refills: 0 | Status: COMPLETED | OUTPATIENT
Start: 2020-12-08 | End: 2020-12-08

## 2020-12-08 RX ORDER — LABETALOL HCL 100 MG
10 TABLET ORAL ONCE
Refills: 0 | Status: COMPLETED | OUTPATIENT
Start: 2020-12-08 | End: 2020-12-08

## 2020-12-08 RX ORDER — IPRATROPIUM BROMIDE 0.2 MG/ML
1 SOLUTION, NON-ORAL INHALATION EVERY 6 HOURS
Refills: 0 | Status: DISCONTINUED | OUTPATIENT
Start: 2020-12-08 | End: 2020-12-13

## 2020-12-08 RX ORDER — ALBUTEROL 90 UG/1
2 AEROSOL, METERED ORAL EVERY 6 HOURS
Refills: 0 | Status: DISCONTINUED | OUTPATIENT
Start: 2020-12-08 | End: 2020-12-13

## 2020-12-08 RX ADMIN — Medication 12.5 MILLIGRAM(S): at 12:40

## 2020-12-08 RX ADMIN — Medication 3 MILLILITER(S): at 05:05

## 2020-12-08 RX ADMIN — Medication 250 MILLIGRAM(S): at 05:45

## 2020-12-08 RX ADMIN — QUETIAPINE FUMARATE 200 MILLIGRAM(S): 200 TABLET, FILM COATED ORAL at 21:10

## 2020-12-08 RX ADMIN — REMDESIVIR 500 MILLIGRAM(S): 5 INJECTION INTRAVENOUS at 17:15

## 2020-12-08 RX ADMIN — Medication 81 MILLIGRAM(S): at 12:40

## 2020-12-08 RX ADMIN — Medication 1 PUFF(S): at 13:02

## 2020-12-08 RX ADMIN — Medication 0.5 MILLIGRAM(S): at 22:31

## 2020-12-08 RX ADMIN — ALBUTEROL 2 PUFF(S): 90 AEROSOL, METERED ORAL at 20:20

## 2020-12-08 RX ADMIN — Medication 6 MILLIGRAM(S): at 16:36

## 2020-12-08 RX ADMIN — CHLORHEXIDINE GLUCONATE 1 APPLICATION(S): 213 SOLUTION TOPICAL at 05:41

## 2020-12-08 RX ADMIN — BUDESONIDE AND FORMOTEROL FUMARATE DIHYDRATE 2 PUFF(S): 160; 4.5 AEROSOL RESPIRATORY (INHALATION) at 09:15

## 2020-12-08 RX ADMIN — GABAPENTIN 300 MILLIGRAM(S): 400 CAPSULE ORAL at 05:45

## 2020-12-08 RX ADMIN — Medication 100 GRAM(S): at 04:00

## 2020-12-08 RX ADMIN — CLOPIDOGREL BISULFATE 75 MILLIGRAM(S): 75 TABLET, FILM COATED ORAL at 12:40

## 2020-12-08 RX ADMIN — PHENYLEPHRINE HYDROCHLORIDE 3.18 MICROGRAM(S)/KG/MIN: 10 INJECTION INTRAVENOUS at 00:24

## 2020-12-08 RX ADMIN — SODIUM CHLORIDE 3000 MILLILITER(S): 9 INJECTION, SOLUTION INTRAVENOUS at 01:21

## 2020-12-08 RX ADMIN — CEFEPIME 100 MILLIGRAM(S): 1 INJECTION, POWDER, FOR SOLUTION INTRAMUSCULAR; INTRAVENOUS at 05:46

## 2020-12-08 RX ADMIN — ATORVASTATIN CALCIUM 20 MILLIGRAM(S): 80 TABLET, FILM COATED ORAL at 23:35

## 2020-12-08 RX ADMIN — Medication 10 MILLIGRAM(S): at 03:00

## 2020-12-08 RX ADMIN — Medication 0.5 MILLIGRAM(S): at 15:07

## 2020-12-08 RX ADMIN — ALBUTEROL 2 PUFF(S): 90 AEROSOL, METERED ORAL at 13:02

## 2020-12-08 RX ADMIN — Medication 12.5 MILLIGRAM(S): at 18:34

## 2020-12-08 RX ADMIN — FAMOTIDINE 20 MILLIGRAM(S): 10 INJECTION INTRAVENOUS at 12:40

## 2020-12-08 RX ADMIN — LAMOTRIGINE 100 MILLIGRAM(S): 25 TABLET, ORALLY DISINTEGRATING ORAL at 15:03

## 2020-12-08 RX ADMIN — SODIUM CHLORIDE 6000 MILLILITER(S): 9 INJECTION, SOLUTION INTRAVENOUS at 01:22

## 2020-12-08 RX ADMIN — GABAPENTIN 300 MILLIGRAM(S): 400 CAPSULE ORAL at 21:10

## 2020-12-08 RX ADMIN — GABAPENTIN 300 MILLIGRAM(S): 400 CAPSULE ORAL at 15:03

## 2020-12-08 RX ADMIN — Medication 1 PUFF(S): at 20:21

## 2020-12-08 RX ADMIN — DABIGATRAN ETEXILATE MESYLATE 150 MILLIGRAM(S): 150 CAPSULE ORAL at 05:45

## 2020-12-08 RX ADMIN — DABIGATRAN ETEXILATE MESYLATE 150 MILLIGRAM(S): 150 CAPSULE ORAL at 18:34

## 2020-12-08 RX ADMIN — Medication 12.5 MILLIGRAM(S): at 02:48

## 2020-12-08 NOTE — PROGRESS NOTE ADULT - SUBJECTIVE AND OBJECTIVE BOX
SHAUN COATES  [id number]  75y Female    Indication for ICU admission: L TCAR , POD # 1 stroke code    Admit Date:11-29-20  ICU Date: 12/4  OR Date: 12/4     codeine (Other (Moderate))  Depakote (Unknown)  Dilaudid (Short breath; Rash)  IV Contrast (Anaphylaxis)  losartan (Angioedema)  Risperdal (Other)  verapamil (Short breath; Angioedema)    PAST MEDICAL & SURGICAL HISTORY:  Falls    Congestive heart failure    Transient ischemic attack    FLAVIO on CPAP    Bipolar 1 disorder    Allergic reaction    PVD (peripheral vascular disease)    Other emphysema    Other cardiomyopathy    TIA (transient ischemic attack)    COPD (chronic obstructive pulmonary disease)    Depression    Hypertension    History of cholecystectomy      Home Medications:  amLODIPine 2.5 mg oral tablet: 1 tab(s) orally once a day (29 Nov 2020 16:46)  aspirin 81 mg oral tablet: 1 tab(s) orally once a day (29 Nov 2020 16:46)  atorvastatin 20 mg oral tablet: 1 tab(s) orally once a day (29 Nov 2020 16:46)  budesonide-formoterol 160 mcg-4.5 mcg/inh inhalation aerosol: 2 puff(s) inhaled 2 times a day (29 Nov 2020 16:46)  furosemide 40 mg oral tablet: 1 tab(s) orally once a day (29 Nov 2020 16:46)  lamoTRIgine 100 mg oral tablet, extended release: 1 tab(s) orally once a day (29 Nov 2020 16:46)  LORazepam 0.5 mg oral tablet: 1 tab(s) orally 2 times a day, As Needed (29 Nov 2020 16:46)  Metoprolol Tartrate 100 mg oral tablet: 1 tab(s) orally 2 times a day (29 Nov 2020 16:46)  montelukast 10 mg oral tablet: 1 tab(s) orally once a day (29 Nov 2020 16:46)  Pepcid 20 mg oral tablet: 1 tab(s) orally once a day (29 Nov 2020 16:46)  Plavix 75 mg oral tablet: 1 tab(s) orally once a day (29 Nov 2020 16:46)  QUEtiapine 200 mg oral tablet: 1 tab(s) orally once a day (at bedtime) (29 Nov 2020 16:46)  tiotropium 18 mcg inhalation capsule: 1 cap(s) inhaled once a day (02 Dec 2020 13:51)        24HRS EVENT:    ON:   - htn to 220, labetolol 10x2--> map decreased to 55  - nicom positive-> given 750 IVF --> map still in high 50s  - started low dose barb--> weaned off after 1hour  - became hypertensive agin 1 hour after barb off  - cherelle reading a lot higher then cuff, spoke with Dr. canela who said to just go by cuff reading   - tmax 101  - covid +       DVT PTX: ASA/Plavix, Pradaxa    GI PTX: Famotidine       ***Tubes/Lines/Drains  ***  Peripheral IV  Urinary Catheter		      REVIEW OF SYSTEMS    [x] A ten-point review of systems was otherwise negative except as noted.  [ ] Due to altered mental status/intubation, subjective information were not able to be obtained from the patient. History was obtained, to the extent possible, from review of the chart and collateral sources of information.      Assessment & Plan    75y Female HD#5 POD#0 s/p left TCAR, right groin access 2/2 >80% stenosis of left ICA, recent TIA    NEURO:    Acute pain-controlled with  Tylenol, avoid narcotics    s/p Left Tcar -q2 neurochecks    hx of TIA-on ASA  -stroke code - patient difficult word finding  -CT hd: No evidence of acute intracranial abnormality  -CT: Normal perfusion images of the brain.  -MRI brain: no acute infact  -keep SBP >110  -started on ASA/plavix/pradaxa  --EEG done -->  generalized slowing   -TSH, ammonia  -h/o Bipolar - restarted all psych meds, lamictal, seroquel, ativan prn    RESP:     Oxygen insufficiency-wean to 5L nc (on home 5L oxygen)    hx of COPD-restarted montelukast, tiotropium    hx of smoking (quit 10+yrs)-encourage IS    Pulm c/s-recs triple inhaler therapy, keep sat >92%    Activity-OOB2C      CARDS:     SBP continue to keep goal 110, now off barb     - nicom non responsive    hx of paroxAfib -on pradaxa, restrted     hx of hld-restarted atorvastatin    Imaging: post op EKG NSR    Labs: CE x3 negative     GI/NUTR:     Diet-CC Regular    GI Prophylaxis-Pepcid (home rx)    /RENAL:     Monitor UO, voiding without difficulty    BUN/Cr- 22/0.9 stable    lytes:        HEME/ONC:     DVT prophylaxis- Pradaxa, ASA/Plavix    Hb/Hct: 10.7 > 10.1  -DVT sono: final  neg    ID:  WBC 5.7, tmax 101  abx: vanc/cefepime  pan culture sent  f/u bld cx, COVID  UA negative    ENDO:  -RISS  -A1c 5.2%    LINES/DRAINS:  PIV, [place]    DISPO:    SICU              SHAUN COATES  [id number]  75y Female    Indication for ICU admission: L TCAR , POD # 1 stroke code    Admit Date:20  ICU Date:   OR Date:      codeine (Other (Moderate))  Depakote (Unknown)  Dilaudid (Short breath; Rash)  IV Contrast (Anaphylaxis)  losartan (Angioedema)  Risperdal (Other)  verapamil (Short breath; Angioedema)    PAST MEDICAL & SURGICAL HISTORY:  Falls    Congestive heart failure    Transient ischemic attack    FLAVIO on CPAP    Bipolar 1 disorder    Allergic reaction    PVD (peripheral vascular disease)    Other emphysema    Other cardiomyopathy    TIA (transient ischemic attack)    COPD (chronic obstructive pulmonary disease)    Depression    Hypertension    History of cholecystectomy      Home Medications:  amLODIPine 2.5 mg oral tablet: 1 tab(s) orally once a day (2020 16:46)  aspirin 81 mg oral tablet: 1 tab(s) orally once a day (2020 16:46)  atorvastatin 20 mg oral tablet: 1 tab(s) orally once a day (2020 16:46)  budesonide-formoterol 160 mcg-4.5 mcg/inh inhalation aerosol: 2 puff(s) inhaled 2 times a day (2020 16:46)  furosemide 40 mg oral tablet: 1 tab(s) orally once a day (2020 16:46)  lamoTRIgine 100 mg oral tablet, extended release: 1 tab(s) orally once a day (2020 16:46)  LORazepam 0.5 mg oral tablet: 1 tab(s) orally 2 times a day, As Needed (2020 16:46)  Metoprolol Tartrate 100 mg oral tablet: 1 tab(s) orally 2 times a day (2020 16:46)  montelukast 10 mg oral tablet: 1 tab(s) orally once a day (2020 16:46)  Pepcid 20 mg oral tablet: 1 tab(s) orally once a day (2020 16:46)  Plavix 75 mg oral tablet: 1 tab(s) orally once a day (2020 16:46)  QUEtiapine 200 mg oral tablet: 1 tab(s) orally once a day (at bedtime) (2020 16:46)  tiotropium 18 mcg inhalation capsule: 1 cap(s) inhaled once a day (02 Dec 2020 13:51)        24HRS EVENT:    ON:   - htn to 220, labetolol 10x2--> map decreased to 55  - nicom positive-> given 750 IVF --> map still in high 50s  - started low dose barb--> weaned off after 1hour  - became hypertensive agin 1 hour after barb off  - cherelle reading a lot higher then cuff, spoke with Dr. canela who said to just go by cuff reading   - tmax 101  - covid +       DVT PTX: ASA/Plavix, Pradaxa    GI PTX: Famotidine       ***Tubes/Lines/Drains  ***  Peripheral IV  Urinary Catheter		      REVIEW OF SYSTEMS    [x] A ten-point review of systems was otherwise negative except as noted.  [ ] Due to altered mental status/intubation, subjective information were not able to be obtained from the patient. History was obtained, to the extent possible, from review of the chart and collateral sources of information.    Daily     Daily Weight in k.1 (08 Dec 2020 04:00)    Diet, Consistent Carbohydrate w/Evening Snack:   Supplement Feeding Modality:  Oral  Glucerna Shake Cans or Servings Per Day:  1       Frequency:  Two Times a day (20 @ 10:13)      CURRENT MEDS:  Neurologic Medications  acetaminophen   Tablet .. 650 milliGRAM(s) Oral every 6 hours PRN Temp greater or equal to 38.5C (101.3F), Mild Pain (1 - 3)  gabapentin 300 milliGRAM(s) Oral three times a day  lamoTRIgine 100 milliGRAM(s) Oral daily  LORazepam     Tablet 0.5 milliGRAM(s) Oral every 6 hours PRN Anxiety  QUEtiapine 200 milliGRAM(s) Oral at bedtime    Respiratory Medications  ALBUTerol    90 MICROgram(s) HFA Inhaler 2 Puff(s) Inhalation every 6 hours  albuterol/ipratropium for Nebulization 3 milliLiter(s) Nebulizer every 4 hours  budesonide 160 MICROgram(s)/formoterol 4.5 MICROgram(s) Inhaler 2 Puff(s) Inhalation two times a day  ipratropium 17 MICROgram(s) HFA Inhaler 1 Puff(s) Inhalation every 6 hours  tiotropium 18 MICROgram(s) Capsule 1 Capsule(s) Inhalation daily    Cardiovascular Medications  metoprolol tartrate 12.5 milliGRAM(s) Oral every 8 hours    Gastrointestinal Medications  famotidine    Tablet 20 milliGRAM(s) Oral daily    Genitourinary Medications    Hematologic/Oncologic Medications  aspirin  chewable 81 milliGRAM(s) Oral daily  clopidogrel Tablet 75 milliGRAM(s) Oral daily  dabigatran 150 milliGRAM(s) Oral every 12 hours    Antimicrobial/Immunologic Medications    Endocrine/Metabolic Medications  atorvastatin 20 milliGRAM(s) Oral at bedtime    Topical/Other Medications  chlorhexidine 4% Liquid 1 Application(s) Topical <User Schedule>      ICU Vital Signs Last 24 Hrs  T(C): 38 (08 Dec 2020 08:00), Max: 38.6 (08 Dec 2020 05:00)  T(F): 100.4 (08 Dec 2020 08:00), Max: 101.4 (08 Dec 2020 05:00)  HR: 86 (08 Dec 2020 11:00) (74 - 107)  BP: 101/59 (08 Dec 2020 11:00) (88/47 - 179/73)  BP(mean): 78 (08 Dec 2020 11:00) (64 - 110)  ABP: 112/55 (08 Dec 2020 11:00) (89/46 - 202/68)  ABP(mean): 74 (08 Dec 2020 11:00) (59 - 113)  RR: 18 (08 Dec 2020 11:00) (18 - 24)  SpO2: 92% (08 Dec 2020 11:00) (81% - 100%)      Adult Advanced Hemodynamics Last 24 Hrs  CVP(mm Hg): --  CVP(cm H2O): --  CO: 5 (07 Dec 2020 12:58) (5 - 5)  CI: 2.7 (07 Dec 2020 12:58) (2.7 - 2.7)  PA: --  PA(mean): --  PCWP: --  SVR: --  SVRI: --  PVR: --  PVRI: --          I&O's Summary    07 Dec 2020 07:01  -  08 Dec 2020 07:00  --------------------------------------------------------  IN: 2850.1 mL / OUT: 2450 mL / NET: 400.1 mL    08 Dec 2020 07:01  -  08 Dec 2020 12:34  --------------------------------------------------------  IN: 250 mL / OUT: 350 mL / NET: -100 mL      I&O's Detail    07 Dec 2020 07:  -  08 Dec 2020 07:00  --------------------------------------------------------  IN:    IV PiggyBack: 125 mL    IV PiggyBack: 900 mL    Lactated Ringers Bolus: 1500 mL    Oral Fluid: 260 mL    Phenylephrine: 65.1 mL  Total IN: 2850.1 mL    OUT:    Voided (mL): 2450 mL  Total OUT: 2450 mL    Total NET: 400.1 mL      08 Dec 2020 07:  -  08 Dec 2020 12:34  --------------------------------------------------------  IN:    Oral Fluid: 250 mL  Total IN: 250 mL    OUT:    Voided (mL): 350 mL  Total OUT: 350 mL    Total NET: -100 mL          PHYSICAL EXAM:    General/Neuro  GCS: 15   Deficits:     alert & oriented x 3, no focal deficits  B/l hand tremor     Lungs:      clear to auscultation, Normal expansion/effort.     Cardiovascular : S1, S2.  Tachycardic.  Peripheral edema     GI: Abdomen soft, Non-tender, Non-distended.      Extremities: Extremities warm but cyanotic    Derm: Good skin turgor, no skin breakdown.      :      Primafit  CXR:       LABS:  CAPILLARY BLOOD GLUCOSE                          8.9    4.22  )-----------( 160      ( 07 Dec 2020 23:50 )             27.3       12-07    139  |  108  |  15  ----------------------------<  96  4.2   |  22  |  0.9    Ca    8.3<L>      07 Dec 2020 23:50  Phos  3.4     12-07  Mg     1.8     12-07        PT/INR - ( 07 Dec 2020 23:50 )   PT: 17.20 sec;   INR: 1.50 ratio         PTT - ( 07 Dec 2020 23:50 )  PTT:40.6 sec  CARDIAC MARKERS ( 07 Dec 2020 10:05 )  x     / <0.01 ng/mL / x     / x     / 1.3 ng/mL  CARDIAC MARKERS ( 07 Dec 2020 04:20 )  x     / <0.01 ng/mL / 24 U/L / x     / 1.4 ng/mL  CARDIAC MARKERS ( 06 Dec 2020 22:12 )  x     / <0.01 ng/mL / 36 U/L / x     / 1.7 ng/mL      Urinalysis Basic - ( 07 Dec 2020 13:02 )    Color: Yellow / Appearance: Clear / S.022 / pH: x  Gluc: x / Ketone: Negative  / Bili: Negative / Urobili: <2 mg/dL   Blood: x / Protein: Trace / Nitrite: Negative   Leuk Esterase: Small / RBC: 71 /HPF / WBC 6 /HPF   Sq Epi: x / Non Sq Epi: 2 /HPF / Bacteria: Few

## 2020-12-08 NOTE — PROGRESS NOTE ADULT - ATTENDING COMMENTS
Patient seen today with neurocritical care NP Key.   I was physically present for the key portions of the evaluation and management (E/M) service provided.  I agree with the above history, physical, and plan with the following additions/modifications     Patient with acute onset of new broca-type aphasia with fluctuating symptoms and concomitant right arma nd leg clumsiness.  Overall high suspicion for true neurologic process referable to the left hemisphere.  Given the negative MRI and CT/CTP workup so far, suspect she is having transient subtle hypoperfusion (or possibly new hyperemia after reperfusion) to that hemisphere - this may be due to reorganization of intracranial flow dynamics after her stenting, vs incomplete reperfusion (stent caliber appears widely variable, will confirm this is not unexpected with vascular surgery).  Symptoms appear to be slowly improving however, will continue to follow.    Jacob Ramirez MD  Attending Neurointensivist

## 2020-12-08 NOTE — PROGRESS NOTE ADULT - SUBJECTIVE AND OBJECTIVE BOX
SHAUN COATES  75y Female   [id number]      Procedure:s/p left TCAR   Patient is a 75y old  Female who presents with a chief complaint of Odynophagia associated with chest pain (07 Dec 2020 09:23)    PAST MEDICAL & SURGICAL HISTORY:  Falls    Congestive heart failure    Transient ischemic attack    FLAVIO on CPAP    Bipolar 1 disorder    Allergic reaction    PVD (peripheral vascular disease)    Other emphysema    Other cardiomyopathy    TIA (transient ischemic attack)    COPD (chronic obstructive pulmonary disease)    Depression    Hypertension    History of cholecystectomy        Events of the Last 24h:  Vital Signs Last 24 Hrs  T(C): 37.9 (08 Dec 2020 00:00), Max: 38.4 (07 Dec 2020 09:00)  T(F): 100.2 (08 Dec 2020 00:00), Max: 101.2 (07 Dec 2020 21:00)  HR: 92 (08 Dec 2020 00:00) (61 - 112)  BP: 88/47 (07 Dec 2020 23:00) (88/47 - 179/73)  BP(mean): 64 (07 Dec 2020 23:00) (64 - 105)  RR: 20 (08 Dec 2020 00:00) (18 - 24)  SpO2: 95% (08 Dec 2020 00:00) (81% - 100%)        Diet, Consistent Carbohydrate w/Evening Snack:   Supplement Feeding Modality:  Oral  Glucerna Shake Cans or Servings Per Day:  1       Frequency:  Two Times a day (20 @ 10:13)      I&O's Summary    06 Dec 2020 07:  -  07 Dec 2020 07:00  --------------------------------------------------------  IN: 2975.4 mL / OUT: 500 mL / NET: 2475.4 mL    07 Dec 2020 07:  -  08 Dec 2020 00:36  --------------------------------------------------------  IN: 1150.1 mL / OUT: 1700 mL / NET: -549.9 mL     I&O's Detail    06 Dec 2020 07:01  -  07 Dec 2020 07:00  --------------------------------------------------------  IN:    Lactated Ringers Bolus: 1500 mL    Oral Fluid: 1260 mL    Phenylephrine: 215.4 mL  Total IN: 2975.4 mL    OUT:    Voided (mL): 500 mL  Total OUT: 500 mL    Total NET: 2475.4 mL      07 Dec 2020 07:01  -  08 Dec 2020 00:36  --------------------------------------------------------  IN:    IV PiggyBack: 125 mL    IV PiggyBack: 550 mL    Lactated Ringers Bolus: 250 mL    Oral Fluid: 160 mL    Phenylephrine: 65.1 mL  Total IN: 1150.1 mL    OUT:    Voided (mL): 1700 mL  Total OUT: 1700 mL    Total NET: -549.9 mL          MEDICATIONS  (STANDING):  albuterol/ipratropium for Nebulization 3 milliLiter(s) Nebulizer every 4 hours  aspirin  chewable 81 milliGRAM(s) Oral daily  atorvastatin 20 milliGRAM(s) Oral at bedtime  budesonide 160 MICROgram(s)/formoterol 4.5 MICROgram(s) Inhaler 2 Puff(s) Inhalation two times a day  cefepime   IVPB 1000 milliGRAM(s) IV Intermittent every 8 hours  cefepime   IVPB      chlorhexidine 4% Liquid 1 Application(s) Topical <User Schedule>  clopidogrel Tablet 75 milliGRAM(s) Oral daily  dabigatran 150 milliGRAM(s) Oral every 12 hours  famotidine    Tablet 20 milliGRAM(s) Oral daily  gabapentin 300 milliGRAM(s) Oral three times a day  lamoTRIgine 100 milliGRAM(s) Oral daily  metoprolol tartrate 12.5 milliGRAM(s) Oral every 8 hours  phenylephrine    Infusion 0.1 MICROgram(s)/kG/Min (3.18 mL/Hr) IV Continuous <Continuous>  QUEtiapine 200 milliGRAM(s) Oral at bedtime  tiotropium 18 MICROgram(s) Capsule 1 Capsule(s) Inhalation daily  vancomycin  IVPB      vancomycin  IVPB 1000 milliGRAM(s) IV Intermittent every 12 hours    MEDICATIONS  (PRN):  acetaminophen   Tablet .. 650 milliGRAM(s) Oral every 6 hours PRN Temp greater or equal to 38.5C (101.3F), Mild Pain (1 - 3)  guaiFENesin   Syrup  (Sugar-Free) 100 milliGRAM(s) Oral every 6 hours PRN Cough  LORazepam     Tablet 0.5 milliGRAM(s) Oral every 6 hours PRN Anxiety      PHYSICAL EXAM:    GENERAL: NAD    HEENT: NCAT dressing clean dry intact     CHEST/LUNGS: CTAB    HEART: RRR,  No murmurs, rubs, or gallops    ABDOMEN: SNTND +BS    EXTREMITIES:  FROM, No clubbing, cyanosis, or edema, palpable pulse    NEURO: No focal neurological deficits    SKIN: No rashes or lesions    INCISION/WOUNDS:                          8.9    4.22  )-----------( 160      ( 07 Dec 2020 23:50 )             27.3        CBC Full  -  ( 07 Dec 2020 23:50 )  WBC Count : 4.22 K/uL  RBC Count : 2.79 M/uL  Hemoglobin : 8.9 g/dL  Hematocrit : 27.3 %  Platelet Count - Automated : 160 K/uL  Mean Cell Volume : 97.8 fL  Mean Cell Hemoglobin : 31.9 pg  Mean Cell Hemoglobin Concentration : 32.6 g/dL  Auto Neutrophil # : x  Auto Lymphocyte # : x  Auto Monocyte # : x  Auto Eosinophil # : x  Auto Basophil # : x  Auto Neutrophil % : x  Auto Lymphocyte % : x  Auto Monocyte % : x  Auto Eosinophil % : x  Auto Basophil % : x               140   |  109   |  22                 Ca: 8.2    BMP:   ----------------------------< 102    M.0   (20 @ 22:12)             4.1    |  22    | 0.9                Ph: 2.9      LFT:     TPro: 6.0 / Alb: 3.5 / TBili: 0.8 / DBili: x / AST: 31 / ALT: 23 / AlkPhos: 182   (20 @ 06:10)      PT/INR - ( 07 Dec 2020 23:50 )   PT: 17.20 sec;   INR: 1.50 ratio         PTT - ( 07 Dec 2020 23:50 )  PTT:40.6 sec  CARDIAC MARKERS ( 07 Dec 2020 10:05 )  x     / <0.01 ng/mL / x     / x     / 1.3 ng/mL  CARDIAC MARKERS ( 07 Dec 2020 04:20 )  x     / <0.01 ng/mL / 24 U/L / x     / 1.4 ng/mL  CARDIAC MARKERS ( 06 Dec 2020 22:12 )  x     / <0.01 ng/mL / 36 U/L / x     / 1.7 ng/mL      Urinalysis Basic - ( 07 Dec 2020 13:02 )    Color: Yellow / Appearance: Clear / S.022 / pH: x  Gluc: x / Ketone: Negative  / Bili: Negative / Urobili: <2 mg/dL   Blood: x / Protein: Trace / Nitrite: Negative   Leuk Esterase: Small / RBC: 71 /HPF / WBC 6 /HPF   Sq Epi: x / Non Sq Epi: 2 /HPF / Bacteria: Few

## 2020-12-08 NOTE — PROGRESS NOTE ADULT - SUBJECTIVE AND OBJECTIVE BOX
HPI:  History of present illness goes back to 6AM on the morning of presentation when the patient first noticed chest pain that was associated with swallowing. She states that she was in her usual state of health the night prior. Of note, she lives with her daughter who is currently in their home quarantining, but the patient states her daughter is not doing a good job and was concerned that she might have COVID, so she went to urgent care and got tested, results pending. Her chest pain was described as severe when she swallowed, radiating to her neck and shoulder so she called EMS and was brought to the ED.      In the ED, her vitals were normal. EMS reported that in transit there was some anterior leads that showed TWI, but repeat EKG in the ED did not show any changes. CXR did however show a LLL opacity, increased from last month's CXR representing a possible developing pneumonia. She also wanted to come to the ED because she is planning to undergo a carotid endarterectomy on 12/15 and was concerned that uf she was ill, she would need to postpone the procedure. Rapid COVID was negative, PCR pending. Admitted by ED for ACS rule-out.     On 12/7 neurology was consulted for expressive aphasia. CT head was negative.     Overnight events: As per RN patient was stable overnight.     Vital Signs Last 24 Hrs  T(C): 37.4 (08 Dec 2020 12:00), Max: 38.6 (08 Dec 2020 05:00)  T(F): 99.3 (08 Dec 2020 12:00), Max: 101.4 (08 Dec 2020 05:00)  HR: 84 (08 Dec 2020 15:30) (80 - 100)  BP: 120/57 (08 Dec 2020 15:30) (88/47 - 179/73)  BP(mean): 82 (08 Dec 2020 15:30) (64 - 110)  RR: 20 (08 Dec 2020 14:00) (18 - 24)  SpO2: 97% (08 Dec 2020 15:30) (83% - 100%)    I&O's Detail    07 Dec 2020 07:01  -  08 Dec 2020 07:00  --------------------------------------------------------  IN:    IV PiggyBack: 900 mL    IV PiggyBack: 125 mL    Lactated Ringers Bolus: 1500 mL    Oral Fluid: 260 mL    Phenylephrine: 65.1 mL  Total IN: 2850.1 mL    OUT:    Voided (mL): 2450 mL  Total OUT: 2450 mL    Total NET: 400.1 mL      08 Dec 2020 07:01  -  08 Dec 2020 16:26  --------------------------------------------------------  IN:    Oral Fluid: 550 mL  Total IN: 550 mL    OUT:    Voided (mL): 750 mL  Total OUT: 750 mL    Total NET: -200 mL    Labs:                          8.9    4.22  )-----------( 160      ( 07 Dec 2020 23:50 )             27.3   12-07    139  |  108  |  15  ----------------------------<  96  4.2   |  22  |  0.9    Ca    8.3<L>      07 Dec 2020 23:50  Phos  3.4     12-07  Mg     1.8     12-07    PT/INR - ( 07 Dec 2020 23:50 )   PT: 17.20 sec;   INR: 1.50 ratio    PTT - ( 07 Dec 2020 23:50 )  PTT:40.6 sec    ABG - ( 08 Dec 2020 15:57 )  pH, Arterial: 7.42  pH, Blood: x     /  pCO2: 38    /  pO2: 64    / HCO3: 24    / Base Excess: -0.1  /  SaO2: 94        Radiology:    EXAM:  CT BRAIN STROKE PROTOCOL        PROCEDURE DATE:  12/05/2020      Impression:    No evidence of acute intracranial abnormality.    Stable mild chronic microvascular ischemic changes.    EXAM:  MR BRAIN        PROCEDURE DATE:  12/06/2020      IMPRESSION:  No acute infarct, acute intracranial hemorrhage, or mass effect    MEDICATIONS  (STANDING):    ALBUTerol    90 MICROgram(s) HFA Inhaler 2 Puff(s) Inhalation every 6 hours  aspirin  chewable 81 milliGRAM(s) Oral daily  atorvastatin 20 milliGRAM(s) Oral at bedtime  budesonide 160 MICROgram(s)/formoterol 4.5 MICROgram(s) Inhaler 2 Puff(s) Inhalation two times a day  chlorhexidine 4% Liquid 1 Application(s) Topical <User Schedule>  clopidogrel Tablet 75 milliGRAM(s) Oral daily  dabigatran 150 milliGRAM(s) Oral every 12 hours  dexAMETHasone  Injectable 6 milliGRAM(s) IV Push daily  famotidine    Tablet 20 milliGRAM(s) Oral daily  gabapentin 300 milliGRAM(s) Oral three times a day  ipratropium 17 MICROgram(s) HFA Inhaler 1 Puff(s) Inhalation every 6 hours  lamoTRIgine 100 milliGRAM(s) Oral daily  metoprolol tartrate 12.5 milliGRAM(s) Oral every 8 hours  QUEtiapine 200 milliGRAM(s) Oral at bedtime  remdesivir  IVPB 200 milliGRAM(s) IV Intermittent once  tiotropium 18 MICROgram(s) Capsule 1 Capsule(s) Inhalation daily    MEDICATIONS  (PRN):    acetaminophen   Tablet .. 650 milliGRAM(s) Oral every 6 hours PRN Temp greater or equal to 38.5C (101.3F), Mild Pain (1 - 3)  LORazepam     Tablet 0.5 milliGRAM(s) Oral every 6 hours PRN Anxiety    Physical Exam:    Gen: Elderly obese female sitting in bed, NAD    MSE: awake, alert, oriented x3, follows multistep commands, attention normal, language fluent with intact naming and repetition    CN: 4 mm B/L BIANCA, EOMI, VFF, face symmetric, TUP midline, no dysarthria noted, patient states she has periods of expressive aphasia    Motor: no pronator drift, RUE 4/5 LUE 5/5 RLE 2/5 LLE 2/5, asterixis noted in B/l UE.    Sensory: intact to LT throughout    Assessment and Plan:    76 yo Female w/ h/o COPD, Depression/BPD, CHF, HTN, FLAVIO, PVD, TIA currently s/p L carotid revascularization w/ stenting currently found on 12/5 with aphasia.  All diagnostics (CTH, CTP, EEG) negative for pathology, newly COVID +. Now patient remains in the ICU and continues to have moments of expressive aphasia.    Plan:    #Neuro    #CV    #Pulm    #GI/    #Skin    #Heme    #ID           HPI:  History of present illness goes back to 6AM on the morning of presentation when the patient first noticed chest pain that was associated with swallowing. She states that she was in her usual state of health the night prior. Of note, she lives with her daughter who is currently in their home quarantining, but the patient states her daughter is not doing a good job and was concerned that she might have COVID, so she went to urgent care and got tested, results pending. Her chest pain was described as severe when she swallowed, radiating to her neck and shoulder so she called EMS and was brought to the ED.      In the ED, her vitals were normal. EMS reported that in transit there was some anterior leads that showed TWI, but repeat EKG in the ED did not show any changes. CXR did however show a LLL opacity, increased from last month's CXR representing a possible developing pneumonia. She also wanted to come to the ED because she is planning to undergo a carotid endarterectomy on 12/15 and was concerned that uf she was ill, she would need to postpone the procedure. Rapid COVID was negative, PCR pending. Admitted by ED for ACS rule-out.     On 12/7 neurology was consulted for expressive aphasia. CT head was negative.     Overnight events: As per RN patient was stable overnight.     Vital Signs Last 24 Hrs  T(C): 37.4 (08 Dec 2020 12:00), Max: 38.6 (08 Dec 2020 05:00)  T(F): 99.3 (08 Dec 2020 12:00), Max: 101.4 (08 Dec 2020 05:00)  HR: 84 (08 Dec 2020 15:30) (80 - 100)  BP: 120/57 (08 Dec 2020 15:30) (88/47 - 179/73)  BP(mean): 82 (08 Dec 2020 15:30) (64 - 110)  RR: 20 (08 Dec 2020 14:00) (18 - 24)  SpO2: 97% (08 Dec 2020 15:30) (83% - 100%)    I&O's Detail    07 Dec 2020 07:01  -  08 Dec 2020 07:00  --------------------------------------------------------  IN:    IV PiggyBack: 900 mL    IV PiggyBack: 125 mL    Lactated Ringers Bolus: 1500 mL    Oral Fluid: 260 mL    Phenylephrine: 65.1 mL  Total IN: 2850.1 mL    OUT:    Voided (mL): 2450 mL  Total OUT: 2450 mL    Total NET: 400.1 mL      08 Dec 2020 07:01  -  08 Dec 2020 16:26  --------------------------------------------------------  IN:    Oral Fluid: 550 mL  Total IN: 550 mL    OUT:    Voided (mL): 750 mL  Total OUT: 750 mL    Total NET: -200 mL    Labs:                          8.9    4.22  )-----------( 160      ( 07 Dec 2020 23:50 )             27.3   12-07    139  |  108  |  15  ----------------------------<  96  4.2   |  22  |  0.9    Ca    8.3<L>      07 Dec 2020 23:50  Phos  3.4     12-07  Mg     1.8     12-07    PT/INR - ( 07 Dec 2020 23:50 )   PT: 17.20 sec;   INR: 1.50 ratio    PTT - ( 07 Dec 2020 23:50 )  PTT:40.6 sec    ABG - ( 08 Dec 2020 15:57 )  pH, Arterial: 7.42  pH, Blood: x     /  pCO2: 38    /  pO2: 64    / HCO3: 24    / Base Excess: -0.1  /  SaO2: 94        Radiology:    EXAM:  CT BRAIN STROKE PROTOCOL        PROCEDURE DATE:  12/05/2020      Impression:    No evidence of acute intracranial abnormality.    Stable mild chronic microvascular ischemic changes.    EXAM:  MR BRAIN        PROCEDURE DATE:  12/06/2020      IMPRESSION:  No acute infarct, acute intracranial hemorrhage, or mass effect    MEDICATIONS  (STANDING):    ALBUTerol    90 MICROgram(s) HFA Inhaler 2 Puff(s) Inhalation every 6 hours  aspirin  chewable 81 milliGRAM(s) Oral daily  atorvastatin 20 milliGRAM(s) Oral at bedtime  budesonide 160 MICROgram(s)/formoterol 4.5 MICROgram(s) Inhaler 2 Puff(s) Inhalation two times a day  chlorhexidine 4% Liquid 1 Application(s) Topical <User Schedule>  clopidogrel Tablet 75 milliGRAM(s) Oral daily  dabigatran 150 milliGRAM(s) Oral every 12 hours  dexAMETHasone  Injectable 6 milliGRAM(s) IV Push daily  famotidine    Tablet 20 milliGRAM(s) Oral daily  gabapentin 300 milliGRAM(s) Oral three times a day  ipratropium 17 MICROgram(s) HFA Inhaler 1 Puff(s) Inhalation every 6 hours  lamoTRIgine 100 milliGRAM(s) Oral daily  metoprolol tartrate 12.5 milliGRAM(s) Oral every 8 hours  QUEtiapine 200 milliGRAM(s) Oral at bedtime  remdesivir  IVPB 200 milliGRAM(s) IV Intermittent once  tiotropium 18 MICROgram(s) Capsule 1 Capsule(s) Inhalation daily    MEDICATIONS  (PRN):    acetaminophen   Tablet .. 650 milliGRAM(s) Oral every 6 hours PRN Temp greater or equal to 38.5C (101.3F), Mild Pain (1 - 3)  LORazepam     Tablet 0.5 milliGRAM(s) Oral every 6 hours PRN Anxiety    Physical Exam:    Gen: Elderly obese female sitting in bed, NAD    MSE: awake, alert, oriented x3, follows multistep commands, attention normal, language fluent with intact naming and repetition    CN: 4 mm B/L BIANCA, EOMI, VFF, face symmetric, TUP midline, no dysarthria noted, patient states she has periods of expressive aphasia    Motor: no pronator drift, RUE 4/5 LUE 5/5 RLE 2/5 LLE 2/5, asterixis noted in B/l UE.    Sensory: intact to LT throughout    Assessment and Plan:    76 yo Female w/ h/o COPD, Depression/BPD, CHF, HTN, FLAVIO, PVD, TIA currently s/p L carotid revascularization w/ stenting currently found on 12/5 with aphasia.  All diagnostics (CTH, CTP, EEG) negative for pathology, newly COVID +. Now patient remains in the ICU and continues to have moments of expressive aphasia.    Plan:    #Neuro  ·	Follow up vascular recs regarding stents- Possibility that alterations in blood flow causing neurological exam.  ·	Continue lamotrigine, Seroquel.  ·	Continue ASA, Plavix, Digatroban   ·	Continue Statin  ·	Q2H Neuro exams  ·	PT/OT  ·	Delirium prevention strategies.    #CV  ·	Normotension    #Pulm  ·	HOB 30  ·	Titrate FiO2 as tolerated  ·	Chest PT    #GI/  ·	Diet as per Nutrition  ·	Strict Is/Os    #Skin  ·	Turning and positioning as per nursing.    #ID  ·	As per primary team

## 2020-12-08 NOTE — PROGRESS NOTE ADULT - ATTENDING COMMENTS
I examined the patient with the PA/resident and discussed my plan with the Resident/PA.  I personally provided over 30 minutes of direct critical care to this patient. I agree with the above resident/pa note unless directly contradicted below.     SHAUN COATES 75F s/p left TCAR, right groin access 2/2 >80% stenosis of left ICA, recent TIA, stroke code negative w/u.       Now covid +, ID consulted  6mg dexamethasone started   aaox3 , no aphasia   Pain controlled with Tylenol   Stroke w/u negative - MRI, CT, EEG , perfusion scan  continue ASA, Plavix, Pradaxa, Statin home meds,   Afib- Pradaxa , restart home lopressor, home Lasix    chest pain overnight with deep breath - ekg no st changes, troponins negative x 3  Home medication-  Seroquel, Lamictal , low dose ativan PRN  COPD- 5LNC at home, continue home inhalers pulm following   Bebeto-Synephrine gtt for sbp > 110 , now off this am   reg diet, Pepcid home med   fever 101.1--> cx's pending-     ,  procalcitonin->   ,  vanc/cefepime dc'd due to COVID + and normal WBC   DVT sono - negative    stay in SICU for fever work up

## 2020-12-08 NOTE — CONSULT NOTE ADULT - SUBJECTIVE AND OBJECTIVE BOX
SHAUN COATES  75y, Female  Allergy: codeine (Other (Moderate))  Depakote (Unknown)  Dilaudid (Short breath; Rash)  IV Contrast (Anaphylaxis)  losartan (Angioedema)  Risperdal (Other)  verapamil (Short breath; Angioedema)      CHIEF COMPLAINT:   Odynophagia associated with chest pain (08 Dec 2020 04:13)      LOS  9d    HPI  HPI:  75y F with COPD on 5L home O2, paroxysmal afib, carotid stenosis, HFpEF, HTN, HLD, Bipolar disorder admitted with Chest pain that was associated with swallowing. She states that she was in her usual state of health the night prior. Of note, she lives with her daughter who is currently in their home quarantining, but the patient states her daughter is not doing a good job and was concerned that she might have COVID, so she went to urgent care and got tested, results pending. Her chest pain was described as severe when she swallowed, radiating to her neck and shoulder so she called EMS and was brought to the ED.        In the ED, her vitals were normal. EMS reported that in transit there was some anterior leads that showed TWI, but repeat EKG in the ED did not show any changes. CXR did however show a LLL opacity, increased from last month's CXR representing a possible developing pneumonia. She also wanted to come to the ED because she is planning to undergo a carotid endarterectomy on 12/15 and was concerned that uf she was ill, she would need to postpone the procedure. Rapid COVID was negative, PCR pending. Admitted by ED for ACS rule-out.       ROS: Denies headache, changes in vision, chest pain, palpitations, shortness of breath, cough, fever, chills, nausea, vomiting, diarrhea, and constipation.  (2020 16:06)      INFECTIOUS DISEASE HISTORY:   s/p left TCAR, right groin access 2/2 >80% stenosis of left ICA, recent TIA  Developed fever , SARS-CoV-2 PCR POSITIVE     PMH  PAST MEDICAL & SURGICAL HISTORY:  Falls    Congestive heart failure    Transient ischemic attack    FLAVIO on CPAP    Bipolar 1 disorder    Allergic reaction    PVD (peripheral vascular disease)    Other emphysema    Other cardiomyopathy    TIA (transient ischemic attack)    COPD (chronic obstructive pulmonary disease)    Depression    Hypertension    History of cholecystectomy        FAMILY HISTORY  No pertinent family history in first degree relatives        SOCIAL HISTORY  Social History:  Lives with COVID+ daughter (2020 16:06)      VITALS:  T(F): 99.3, Max: 101.4 (20 @ 05:00)  HR: 84  BP: 120/57  RR: 20Vital Signs Last 24 Hrs  T(C): 37.4 (08 Dec 2020 12:00), Max: 38.6 (08 Dec 2020 05:00)  T(F): 99.3 (08 Dec 2020 12:00), Max: 101.4 (08 Dec 2020 05:00)  HR: 84 (08 Dec 2020 15:30) (80 - 100)  BP: 120/57 (08 Dec 2020 15:30) (88/47 - 179/73)  BP(mean): 82 (08 Dec 2020 15:30) (64 - 110)  RR: 20 (08 Dec 2020 14:00) (18 - 24)  SpO2: 97% (08 Dec 2020 15:30) (83% - 100%)    TESTS & MEASUREMENTS:                        8.9    4.22  )-----------( 160      ( 07 Dec 2020 23:50 )             27.3         139  |  108  |  15  ----------------------------<  96  4.2   |  22  |  0.9    Ca    8.3<L>      07 Dec 2020 23:50  Phos  3.4       Mg     1.8           eGFR if Non African American: 63 mL/min/1.73M2 (20 @ 23:50)  eGFR if : 74 mL/min/1.73M2 (20 @ 23:50)      Urinalysis Basic - ( 07 Dec 2020 13:02 )    Color: Yellow / Appearance: Clear / S.022 / pH: x  Gluc: x / Ketone: Negative  / Bili: Negative / Urobili: <2 mg/dL   Blood: x / Protein: Trace / Nitrite: Negative   Leuk Esterase: Small / RBC: 71 /HPF / WBC 6 /HPF   Sq Epi: x / Non Sq Epi: 2 /HPF / Bacteria: Few        Culture - Urine (collected 10-16-20 @ 12:00)  Source: .Urine Clean Catch (Midstream)  Final Report (10-19-20 @ 18:15):    >100,000 CFU/ml Escherichia coli  Organism: Escherichia coli (10-19-20 @ 18:15)  Organism: Escherichia coli (10-19-20 @ 18:15)      -  Amikacin: S <=16      -  Amoxicillin/Clavulanic Acid: S <=8/4      -  Ampicillin: S <=8 These ampicillin results predict results for amoxicillin      -  Ampicillin/Sulbactam: S <=4/2 Enterobacter, Citrobacter, and Serratia may develop resistance during prolonged therapy (3-4 days)      -  Aztreonam: S <=4      -  Cefazolin: S <=2 (MIC_CL_COM_ENTERIC_CEFAZU) For uncomplicated UTI with K. pneumoniae, E. coli, or P. mirablis: MARCELLO <=16 is sensitive and MARCELLO >=32 is resistant. This also predicts results for oral agents cefaclor, cefdinir, cefpodoxime, cefprozil, cefuroxime axetil, cephalexin and locarbef for uncomplicated UTI. Note that some isolates may be susceptible to these agents while testing resistant to cefazolin.      -  Cefepime: S <=2      -  Cefoxitin: S <=8      -  Ceftriaxone: S <=1 Enterobacter, Citrobacter, and Serratia may develop resistance during prolonged therapy      -  Ciprofloxacin: R >2      -  Ertapenem: S <=0.5      -  Gentamicin: S <=2      -  Imipenem: S <=1      -  Levofloxacin: R >4      -  Meropenem: S <=1      -  Nitrofurantoin: S <=32 Should not be used to treat pyelonephritis      -  Piperacillin/Tazobactam: S <=8      -  Tigecycline: S <=2      -  Tobramycin: S 4      -  Trimethoprim/Sulfamethoxazole: S <=0.5/9.5      Method Type: MARCELLO    Culture - Urine (collected 20 @ 16:30)  Source: .Urine Clean Catch (Midstream)  Final Report (20 @ 20:01):    >100,000 CFU/ml Proteus mirabilis    10,000 - 49,000 CFU/mL Escherichia coli  Organism: Proteus mirabilis  Escherichia coli (20 @ 20:01)  Organism: Escherichia coli (20 @ 20:01)      -  Amikacin: S <=16      -  Ampicillin: S <=8 These ampicillin results predict results for amoxicillin      -  Ampicillin/Sulbactam: S <=8/4 Enterobacter, Citrobacter, and Serratia may develop resistance during prolonged therapy (3-4 days)      -  Aztreonam: S <=4      -  Cefazolin: S <=8 (MIC_CL_COM_ENTERIC_CEFAZU) For uncomplicated UTI with K. pneumoniae, E. coli, or P. mirablis: MARCELLO <=16 is sensitive and MARCELLO >=32 is resistant. This also predicts results for oral agents cefaclor, cefdinir, cefpodoxime, cefprozil, cefuroxime axetil, cephalexin and locarbef for uncomplicated UTI. Note that some isolates may be susceptible to these agents while testing resistant to cefazolin.      -  Cefepime: S <=4      -  Cefoxitin: S <=8      -  Ceftriaxone: S <=1 Enterobacter, Citrobacter, and Serratia may develop resistance during prolonged therapy      -  Ciprofloxacin: R >2      -  Gentamicin: S <=4      -  Imipenem: S <=1      -  Levofloxacin: R >4      -  Meropenem: S <=1      -  Nitrofurantoin: S <=32 Should not be used to treat pyelonephritis      -  Piperacillin/Tazobactam: S <=16      -  Tigecycline: S <=2      -  Tobramycin: S <=4      -  Trimethoprim/Sulfamethoxazole: S <=2/38      Method Type: MARCELLO  Organism: Proteus mirabilis (20 @ 20:01)      -  Amikacin: S <=16      -  Ampicillin: S <=8 These ampicillin results predict results for amoxicillin      -  Ampicillin/Sulbactam: S <=8/4 Enterobacter, Citrobacter, and Serratia may develop resistance during prolonged therapy (3-4 days)      -  Aztreonam: S <=4      -  Cefazolin: S <=8 (MIC_CL_COM_ENTERIC_CEFAZU) For uncomplicated UTI with K. pneumoniae, E. coli, or P. mirablis: MARCELLO <=16 is sensitive and MARCELLO >=32 is resistant. This also predicts results for oral agents cefaclor, cefdinir, cefpodoxime, cefprozil, cefuroxime axetil, cephalexin and locarbef for uncomplicated UTI. Note that some isolates may be susceptible to these agents while testing resistant to cefazolin.      -  Cefepime: S <=4      -  Cefoxitin: S <=8      -  Ceftriaxone: S <=1 Enterobacter, Citrobacter, and Serratia may develop resistance during prolonged therapy      -  Ciprofloxacin: S <=1      -  Gentamicin: S <=4      -  Levofloxacin: S <=2      -  Meropenem: S <=1      -  Nitrofurantoin: R >64 Should not be used to treat pyelonephritis      -  Piperacillin/Tazobactam: S <=16      -  Tobramycin: S <=4      -  Trimethoprim/Sulfamethoxazole: S <=2/38      Method Type: MARCELLO        Lactate, Blood: 1.0 mmol/L (20 @ 23:50)      INFECTIOUS DISEASES TESTING  COVID- PCR: Detected (20 @ 12:30)  Procalcitonin, Serum: 0.09 ng/mL (20 @ 21:30)  COVID- PCR: NotDetec (20 @ 13:30)  Procalcitonin, Serum: 0.10 ng/mL (20 @ 13:15)  Rapid RVP Result: NotDetec (10-14-20 @ 18:06)      INFLAMMATORY MARKERS      RADIOLOGY & ADDITIONAL TESTS:  I have personally reviewed the last Chest xray  CXR      CT      CARDIOLOGY TESTING  12 Lead ECG:   Ventricular Rate 72 BPM    Atrial Rate 72 BPM    P-R Interval 142 ms    QRS Duration 102 ms    Q-T Interval 398 ms    QTC Calculation(Bazett) 435 ms    R Axis 177 degrees    T Axis 98 degrees    Diagnosis Line Normal sinus rhythm  limb lead reversal possible  Right axis deviation  T wave abnormality, consider lateral ischemia  Abnormal ECG    Confirmed by SREE LLAMAS MD ([id number]) on 2020 11:36:41 PM (20 @ 22:13)  12 Lead ECG:   Ventricular Rate 72 BPM    Atrial Rate 72 BPM    P-R Interval 148 ms    QRS Duration 92 ms    Q-T Interval 394 ms    QTC Calculation(Bazett) 431 ms    R Axis 176 degrees    T Axis 100 degrees    Diagnosis Line Normal sinus rhythm  limb lead reversal likely suggest repeat ekg  T wave abnormality, consider lateral ischemia  Abnormal ECG    Confirmed by SREE LLAMAS MD ([id number]) on 2020 11:35:02 PM (20 @ 22:12)      MEDICATIONS  ALBUTerol    90 MICROgram(s) HFA Inhaler 2 Inhalation every 6 hours  aspirin  chewable 81 Oral daily  atorvastatin 20 Oral at bedtime  budesonide 160 MICROgram(s)/formoterol 4.5 MICROgram(s) Inhaler 2 Inhalation two times a day  chlorhexidine 4% Liquid 1 Topical <User Schedule>  clopidogrel Tablet 75 Oral daily  dabigatran 150 Oral every 12 hours  dexAMETHasone  Injectable 6 IV Push daily  famotidine    Tablet 20 Oral daily  gabapentin 300 Oral three times a day  ipratropium 17 MICROgram(s) HFA Inhaler 1 Inhalation every 6 hours  lamoTRIgine 100 Oral daily  metoprolol tartrate 12.5 Oral every 8 hours  QUEtiapine 200 Oral at bedtime  tiotropium 18 MICROgram(s) Capsule 1 Inhalation daily      Weight  Weight (kg): 84.4 (20 @ 14:30)    ANTIBIOTICS:      ALLERGIES:  codeine (Other (Moderate))  Depakote (Unknown)  Dilaudid (Short breath; Rash)  IV Contrast (Anaphylaxis)  losartan (Angioedema)  Risperdal (Other)  verapamil (Short breath; Angioedema)

## 2020-12-08 NOTE — PHYSICAL THERAPY INITIAL EVALUATION ADULT - PRECAUTIONS/LIMITATIONS, REHAB EVAL
fall precautions/oxygen therapy device and L/min/O2 @ 3l/min via NC +COVID/oxygen therapy device and L/min/fall precautions/isolation precautions

## 2020-12-08 NOTE — CONSULT NOTE ADULT - ASSESSMENT
ASSESSMENT  75y F with COPD on 5L home O2, paroxysmal afib, carotid stenosis, HFpEF, HTN, HLD, Bipolar disorder admitted with Chest pain s/p left TCAR, right groin access now found to be COVID-19 PNA    IMPRESSION  #Severe COVID-19 PNA with sepsis    requiring supplemental O2 (on home O2)  < from: Xray Chest 1 View- PORTABLE-Routine (12.08.20 @ 06:32) >Diffuse bilateral pulmonary opacities increased from prior.  #Obesity BMI (kg/m2): 34    Creatinine, Serum: 0.9 (12-07-20 @ 23:50)      RECOMMENDATIONS  - Remdesivir D1: 200mg x1, Day 2 - Day 5 100mg IV daily. Monitor Cr/LFTs  - Can offer and consent for convalescent Plasma: 2 units over 2h each  - Dexamethasone 6mg daily x at least 10 days or until discharge (whichever is longer)  - A/C per primary team  - Prone positioning if possible   - Check ddimer, ferritin, CRP with next labs    If any questions, please call or send a message on [employer] Teams  Spectra [phone]

## 2020-12-08 NOTE — PHYSICAL THERAPY INITIAL EVALUATION ADULT - GENERAL OBSERVATIONS, REHAB EVAL
Pt encountered in the b/s chair, + O2 via NC, Primafit, +radial A line, ECG, BP cuff, agreeable for b/s PT IE/tx. Pt left in the b/s chair post tx all needs within reach. Pt encountered in the b/s chair, + O2@ 3l/min via NC, Primafit, +radial A line, ECG, BP cuff, agreeable for b/s PT IE/tx. Pt left in the b/s chair post tx all needs within reach.

## 2020-12-08 NOTE — PHYSICAL THERAPY INITIAL EVALUATION ADULT - CRITERIA FOR SKILLED THERAPEUTIC INTERVENTIONS
therapy frequency/anticipated discharge recommendation/rehab potential/risk reduction/prevention/impairments found/predicted duration of therapy intervention/functional limitations in following categories

## 2020-12-08 NOTE — PROGRESS NOTE ADULT - ASSESSMENT
Assessment & Plan    75y Female HD#5 POD#0 s/p left TCAR, right groin access 2/2 >80% stenosis of left ICA, recent TIA    NEURO:    Acute pain-controlled with  Tylenol, avoid narcotics    s/p Left Tcar -q1 neurochecks    hx of TIA-on ASA  -stroke code yesterday- patient difficult word finding  -CT hd: No evidence of acute intracranial abnormality  -CT: Normal perfusion images of the brain.  -MRI brain no acute findinds   -started on ASA/plavix/pradaxa  --EEG pending  - ammonia 29  -h/o Bipolar - restarted all psych meds, lamictal, seroquel, ativan prn    RESP:     Oxygen insufficiency-wean to 3L nc (on home 5L oxygen)    hx of COPD-restarted montelukast, tiotropium    hx of smoking (quit 10+yrs)-encourage IS    Pulm c/s-recs triple inhaler therapy, keep sat >92%    Activity-bedrest      CARDS:     SBP continue to keep goal 140, on Bebeto gtt.  Per vascular, can start weaning parameters for goal SBP >110    hx of paroxAfib -on pradaxa, restarted    hx of hld-restarted atorvastatin    Imaging: post op EKG NSR    Labs: CE x3 negative     GI/NUTR:     Diet-Regular    GI Prophylaxis-Pepcid (home rx)    /RENAL:     Monitor UO, voiding without difficulty    BUN/Cr-     HEME/ONC:     DVT prophylaxis- Pradaxa, ASA/Plavix    Hb/Hct: 11.5    ID:    no post op abx as per vascular fellow, d/w Dr. Esparza    ENDO:    FS QAC and QHS  RISS    LINES/DRAINS:  Waleska NUÑEZ    DISPO:    SICU

## 2020-12-09 LAB
ANION GAP SERPL CALC-SCNC: 10 MMOL/L — SIGNIFICANT CHANGE UP (ref 7–14)
APTT BLD: 41.9 SEC — HIGH (ref 27–39.2)
BUN SERPL-MCNC: 15 MG/DL — SIGNIFICANT CHANGE UP (ref 10–20)
CALCIUM SERPL-MCNC: 8.6 MG/DL — SIGNIFICANT CHANGE UP (ref 8.5–10.1)
CHLORIDE SERPL-SCNC: 108 MMOL/L — SIGNIFICANT CHANGE UP (ref 98–110)
CO2 SERPL-SCNC: 21 MMOL/L — SIGNIFICANT CHANGE UP (ref 17–32)
CREAT SERPL-MCNC: 0.8 MG/DL — SIGNIFICANT CHANGE UP (ref 0.7–1.5)
CRP SERPL-MCNC: 8.14 MG/DL — HIGH (ref 0–0.4)
D DIMER BLD IA.RAPID-MCNC: 207 NG/ML DDU — SIGNIFICANT CHANGE UP (ref 0–230)
D DIMER BLD IA.RAPID-MCNC: 251 NG/ML DDU — HIGH (ref 0–230)
FERRITIN SERPL-MCNC: 254 NG/ML — HIGH (ref 15–150)
GLUCOSE SERPL-MCNC: 148 MG/DL — HIGH (ref 70–99)
HCT VFR BLD CALC: 30.2 % — LOW (ref 37–47)
HGB BLD-MCNC: 9.6 G/DL — LOW (ref 12–16)
INR BLD: 1.23 RATIO — SIGNIFICANT CHANGE UP (ref 0.65–1.3)
MAGNESIUM SERPL-MCNC: 2.2 MG/DL — SIGNIFICANT CHANGE UP (ref 1.8–2.4)
MCHC RBC-ENTMCNC: 31.1 PG — HIGH (ref 27–31)
MCHC RBC-ENTMCNC: 31.8 G/DL — LOW (ref 32–37)
MCV RBC AUTO: 97.7 FL — SIGNIFICANT CHANGE UP (ref 81–99)
NRBC # BLD: 0 /100 WBCS — SIGNIFICANT CHANGE UP (ref 0–0)
PHOSPHATE SERPL-MCNC: 3.7 MG/DL — SIGNIFICANT CHANGE UP (ref 2.1–4.9)
PLATELET # BLD AUTO: 162 K/UL — SIGNIFICANT CHANGE UP (ref 130–400)
POTASSIUM SERPL-MCNC: 4.5 MMOL/L — SIGNIFICANT CHANGE UP (ref 3.5–5)
POTASSIUM SERPL-SCNC: 4.5 MMOL/L — SIGNIFICANT CHANGE UP (ref 3.5–5)
PROTHROM AB SERPL-ACNC: 14.1 SEC — HIGH (ref 9.95–12.87)
RBC # BLD: 3.09 M/UL — LOW (ref 4.2–5.4)
RBC # FLD: 13.5 % — SIGNIFICANT CHANGE UP (ref 11.5–14.5)
SODIUM SERPL-SCNC: 139 MMOL/L — SIGNIFICANT CHANGE UP (ref 135–146)
WBC # BLD: 3.71 K/UL — LOW (ref 4.8–10.8)
WBC # FLD AUTO: 3.71 K/UL — LOW (ref 4.8–10.8)

## 2020-12-09 PROCEDURE — 99232 SBSQ HOSP IP/OBS MODERATE 35: CPT

## 2020-12-09 PROCEDURE — 71045 X-RAY EXAM CHEST 1 VIEW: CPT | Mod: 26

## 2020-12-09 PROCEDURE — 99291 CRITICAL CARE FIRST HOUR: CPT

## 2020-12-09 RX ORDER — FUROSEMIDE 40 MG
40 TABLET ORAL DAILY
Refills: 0 | Status: DISCONTINUED | OUTPATIENT
Start: 2020-12-09 | End: 2020-12-13

## 2020-12-09 RX ADMIN — BUDESONIDE AND FORMOTEROL FUMARATE DIHYDRATE 2 PUFF(S): 160; 4.5 AEROSOL RESPIRATORY (INHALATION) at 22:05

## 2020-12-09 RX ADMIN — Medication 0.5 MILLIGRAM(S): at 06:55

## 2020-12-09 RX ADMIN — Medication 12.5 MILLIGRAM(S): at 17:22

## 2020-12-09 RX ADMIN — FAMOTIDINE 20 MILLIGRAM(S): 10 INJECTION INTRAVENOUS at 13:23

## 2020-12-09 RX ADMIN — Medication 1 PUFF(S): at 13:33

## 2020-12-09 RX ADMIN — DABIGATRAN ETEXILATE MESYLATE 150 MILLIGRAM(S): 150 CAPSULE ORAL at 05:31

## 2020-12-09 RX ADMIN — REMDESIVIR 500 MILLIGRAM(S): 5 INJECTION INTRAVENOUS at 17:48

## 2020-12-09 RX ADMIN — Medication 81 MILLIGRAM(S): at 13:18

## 2020-12-09 RX ADMIN — DABIGATRAN ETEXILATE MESYLATE 150 MILLIGRAM(S): 150 CAPSULE ORAL at 17:20

## 2020-12-09 RX ADMIN — QUETIAPINE FUMARATE 200 MILLIGRAM(S): 200 TABLET, FILM COATED ORAL at 22:20

## 2020-12-09 RX ADMIN — Medication 6 MILLIGRAM(S): at 05:31

## 2020-12-09 RX ADMIN — GABAPENTIN 300 MILLIGRAM(S): 400 CAPSULE ORAL at 05:32

## 2020-12-09 RX ADMIN — TIOTROPIUM BROMIDE 1 CAPSULE(S): 18 CAPSULE ORAL; RESPIRATORY (INHALATION) at 08:10

## 2020-12-09 RX ADMIN — ALBUTEROL 2 PUFF(S): 90 AEROSOL, METERED ORAL at 06:50

## 2020-12-09 RX ADMIN — Medication 1 PUFF(S): at 08:11

## 2020-12-09 RX ADMIN — CHLORHEXIDINE GLUCONATE 1 APPLICATION(S): 213 SOLUTION TOPICAL at 05:32

## 2020-12-09 RX ADMIN — ALBUTEROL 2 PUFF(S): 90 AEROSOL, METERED ORAL at 13:34

## 2020-12-09 RX ADMIN — Medication 0.5 MILLIGRAM(S): at 22:29

## 2020-12-09 RX ADMIN — Medication 0.5 MILLIGRAM(S): at 13:49

## 2020-12-09 RX ADMIN — ALBUTEROL 2 PUFF(S): 90 AEROSOL, METERED ORAL at 08:11

## 2020-12-09 RX ADMIN — Medication 650 MILLIGRAM(S): at 10:31

## 2020-12-09 RX ADMIN — ATORVASTATIN CALCIUM 20 MILLIGRAM(S): 80 TABLET, FILM COATED ORAL at 22:20

## 2020-12-09 RX ADMIN — Medication 40 MILLIGRAM(S): at 14:17

## 2020-12-09 RX ADMIN — GABAPENTIN 300 MILLIGRAM(S): 400 CAPSULE ORAL at 13:18

## 2020-12-09 RX ADMIN — GABAPENTIN 300 MILLIGRAM(S): 400 CAPSULE ORAL at 22:20

## 2020-12-09 RX ADMIN — Medication 1 PUFF(S): at 06:50

## 2020-12-09 RX ADMIN — Medication 12.5 MILLIGRAM(S): at 01:56

## 2020-12-09 RX ADMIN — Medication 1 TABLET(S): at 17:48

## 2020-12-09 RX ADMIN — CLOPIDOGREL BISULFATE 75 MILLIGRAM(S): 75 TABLET, FILM COATED ORAL at 13:22

## 2020-12-09 RX ADMIN — Medication 12.5 MILLIGRAM(S): at 10:31

## 2020-12-09 RX ADMIN — LAMOTRIGINE 100 MILLIGRAM(S): 25 TABLET, ORALLY DISINTEGRATING ORAL at 13:23

## 2020-12-09 NOTE — CONSULT NOTE ADULT - REASON FOR ADMISSION
Odynophagia associated with chest pain

## 2020-12-09 NOTE — PROGRESS NOTE ADULT - SUBJECTIVE AND OBJECTIVE BOX
SHAUN COATES  75y, Female  Allergy: codeine (Other (Moderate))  Depakote (Unknown)  Dilaudid (Short breath; Rash)  IV Contrast (Anaphylaxis)  losartan (Angioedema)  Risperdal (Other)  verapamil (Short breath; Angioedema)      LOS  10d    CHIEF COMPLAINT: Odynophagia associated with chest pain (09 Dec 2020 08:27)      INTERVAL EVENTS/HPI  - on NC  - T(F): , Max: 99.3 (20 @ 12:00)  - Tolerating medication  - WBC Count: 3.71 (20 @ 02:00)  WBC Count: 4.22 (20 @ 23:50)  - Creatinine, Serum: 0.8 (20 @ 02:00)  Creatinine, Serum: 0.9 (20 @ 16:29)       ROS  ***    VITALS:  T(F): 97.2, Max: 99.3 (20 @ 12:00)  HR: 80  BP: 144/60  RR: 20Vital Signs Last 24 Hrs  T(C): 36.2 (09 Dec 2020 08:00), Max: 37.4 (08 Dec 2020 12:00)  T(F): 97.2 (09 Dec 2020 08:00), Max: 99.3 (08 Dec 2020 12:00)  HR: 80 (09 Dec 2020 08:00) (68 - 95)  BP: 144/60 (09 Dec 2020 08:00) (101/50 - 188/78)  BP(mean): 87 (09 Dec 2020 08:00) (74 - 112)  RR: 20 (09 Dec 2020 08:00) (16 - 22)  SpO2: 94% (09 Dec 2020 08:00) (90% - 97%)    PHYSICAL EXAM:  ***    FH: Non-contributory  Social Hx: Non-contributory    TESTS & MEASUREMENTS:                        9.6    3.71  )-----------( 162      ( 09 Dec 2020 02:00 )             30.2         139  |  108  |  15  ----------------------------<  148<H>  4.5   |  21  |  0.8    Ca    8.6      09 Dec 2020 02:00  Phos  3.7     12  Mg     2.2           eGFR if Non African American: 73 mL/min/1.73M2 (20 @ 02:00)  eGFR if : 85 mL/min/1.73M2 (20 @ 02:00)  eGFR if Non African American: 63 mL/min/1.73M2 (20 @ 16:29)  eGFR if : 74 mL/min/1.73M2 (20 @ 16:29)      Urinalysis Basic - ( 07 Dec 2020 13:02 )    Color: Yellow / Appearance: Clear / S.022 / pH: x  Gluc: x / Ketone: Negative  / Bili: Negative / Urobili: <2 mg/dL   Blood: x / Protein: Trace / Nitrite: Negative   Leuk Esterase: Small / RBC: 71 /HPF / WBC 6 /HPF   Sq Epi: x / Non Sq Epi: 2 /HPF / Bacteria: Few        Culture - Blood (collected 20 @ 11:28)  Source: .Blood Blood-Peripheral  Preliminary Report (20 @ 23:01):    No growth to date.        Lactate, Blood: 1.0 mmol/L (20 @ 23:50)      INFECTIOUS DISEASES TESTING  Vancomycin Level, Trough: 16.2 (20 @ 16:29)  COVID-19 PCR: Detected (20 @ 12:30)  Procalcitonin, Serum: 0.09 (20 @ 10:05)  Procalcitonin, Serum: 0.09 (20 @ 21:30)  COVID-19 PCR: NotDetec (20 @ 13:30)  Procalcitonin, Serum: 0.10 (20 @ 13:15)  Rapid RVP Result: NotDetec (10-14-20 @ 18:06)      INFLAMMATORY MARKERS  C-Reactive Protein, Serum: 1.88 mg/dL (20 @ 13:15)      RADIOLOGY & ADDITIONAL TESTS:  I have personally reviewed the last available Chest xray  CXR      CT      CARDIOLOGY TESTING  12 Lead ECG:   Ventricular Rate 72 BPM    Atrial Rate 72 BPM    P-R Interval 142 ms    QRS Duration 102 ms    Q-T Interval 398 ms    QTC Calculation(Bazett) 435 ms    R Axis 177 degrees    T Axis 98 degrees    Diagnosis Line Normal sinus rhythm  limb lead reversal possible  Right axis deviation  T wave abnormality, consider lateral ischemia  Abnormal ECG    Confirmed by SREE LLAMAS MD ([id number]) on 2020 11:36:41 PM (20 @ 22:13)  12 Lead ECG:   Ventricular Rate 72 BPM    Atrial Rate 72 BPM    P-R Interval 148 ms    QRS Duration 92 ms    Q-T Interval 394 ms    QTC Calculation(Bazett) 431 ms    R Axis 176 degrees    T Axis 100 degrees    Diagnosis Line Normal sinus rhythm  limb lead reversal likely suggest repeat ekg  T wave abnormality, consider lateral ischemia  Abnormal ECG    Confirmed by SREE LLAMAS MD ([id number]) on 2020 11:35:02 PM (20 @ 22:12)      MEDICATIONS  ALBUTerol    90 MICROgram(s) HFA Inhaler 2 Inhalation every 6 hours  aspirin  chewable 81 Oral daily  atorvastatin 20 Oral at bedtime  budesonide 160 MICROgram(s)/formoterol 4.5 MICROgram(s) Inhaler 2 Inhalation two times a day  chlorhexidine 4% Liquid 1 Topical <User Schedule>  clopidogrel Tablet 75 Oral daily  dabigatran 150 Oral every 12 hours  dexAMETHasone  Injectable 6 IV Push daily  famotidine    Tablet 20 Oral daily  gabapentin 300 Oral three times a day  ipratropium 17 MICROgram(s) HFA Inhaler 1 Inhalation every 6 hours  lamoTRIgine 100 Oral daily  metoprolol tartrate 12.5 Oral every 8 hours  QUEtiapine 200 Oral at bedtime  remdesivir  IVPB 100 IV Intermittent every 24 hours  tiotropium 18 MICROgram(s) Capsule 1 Inhalation daily      WEIGHT  Weight (kg): 84.4 (20 @ 14:30)  Creatinine, Serum: 0.8 mg/dL (20 @ 02:00)  Creatinine, Serum: 0.9 mg/dL (20 @ 16:29)      ANTIBIOTICS:  remdesivir  IVPB 100 milliGRAM(s) IV Intermittent every 24 hours      All available historical records have been reviewed       SHAUN COATES  75y, Female  Allergy: codeine (Other (Moderate))  Depakote (Unknown)  Dilaudid (Short breath; Rash)  IV Contrast (Anaphylaxis)  losartan (Angioedema)  Risperdal (Other)  verapamil (Short breath; Angioedema)      LOS  10d    CHIEF COMPLAINT: Odynophagia associated with chest pain (09 Dec 2020 08:27)      INTERVAL EVENTS/HPI  - on NC  - T(F): , Max: 99.3 (20 @ 12:00)  - Tolerating medication  - WBC Count: 3.71 (20 @ 02:00)  WBC Count: 4.22 (20 @ 23:50)  - Creatinine, Serum: 0.8 (20 @ 02:00)  Creatinine, Serum: 0.9 (20 @ 16:29)       ROS  General: Denies rigors, nightsweats  HEENT: Denies headache, rhinorrhea, sore throat, eye pain  CV: Denies CP, palpitations  PULM: Denies wheezing, hemoptysis  GI: Denies hematemesis, hematochezia, melena  : Denies discharge, hematuria  MSK: Denies arthralgias, myalgias  SKIN: Denies rash, lesions  NEURO: Denies paresthesias, weakness  PSYCH: Denies depression, anxiety     VITALS:  T(F): 97.2, Max: 99.3 (20 @ 12:00)  HR: 80  BP: 144/60  RR: 20Vital Signs Last 24 Hrs  T(C): 36.2 (09 Dec 2020 08:00), Max: 37.4 (08 Dec 2020 12:00)  T(F): 97.2 (09 Dec 2020 08:00), Max: 99.3 (08 Dec 2020 12:00)  HR: 80 (09 Dec 2020 08:00) (68 - 95)  BP: 144/60 (09 Dec 2020 08:00) (101/50 - 188/78)  BP(mean): 87 (09 Dec 2020 08:00) (74 - 112)  RR: 20 (09 Dec 2020 08:00) (16 - 22)  SpO2: 94% (09 Dec 2020 08:00) (90% - 97%)    PHYSICAL EXAM:  Gen: on NC  HEENT: NCAT  Resp: b/l chest expansion  Neuro: nonfocal     FH: Non-contributory  Social Hx: Non-contributory    TESTS & MEASUREMENTS:                        9.6    3.71  )-----------( 162      ( 09 Dec 2020 02:00 )             30.2         139  |  108  |  15  ----------------------------<  148<H>  4.5   |  21  |  0.8    Ca    8.6      09 Dec 2020 02:00  Phos  3.7       Mg     2.2           eGFR if Non African American: 73 mL/min/1.73M2 (20 @ 02:00)  eGFR if : 85 mL/min/1.73M2 (20 @ 02:00)  eGFR if Non African American: 63 mL/min/1.73M2 (20 @ 16:29)  eGFR if : 74 mL/min/1.73M2 (20 @ 16:29)      Urinalysis Basic - ( 07 Dec 2020 13:02 )    Color: Yellow / Appearance: Clear / S.022 / pH: x  Gluc: x / Ketone: Negative  / Bili: Negative / Urobili: <2 mg/dL   Blood: x / Protein: Trace / Nitrite: Negative   Leuk Esterase: Small / RBC: 71 /HPF / WBC 6 /HPF   Sq Epi: x / Non Sq Epi: 2 /HPF / Bacteria: Few        Culture - Blood (collected 20 @ 11:28)  Source: .Blood Blood-Peripheral  Preliminary Report (20 @ 23:01):    No growth to date.        Lactate, Blood: 1.0 mmol/L (20 @ 23:50)      INFECTIOUS DISEASES TESTING  Vancomycin Level, Trough: 16.2 (20 @ 16:29)  COVID- PCR: Detected (20 @ 12:30)  Procalcitonin, Serum: 0.09 (20 @ 10:05)  Procalcitonin, Serum: 0.09 (20 @ 21:30)  COVID-19 PCR: NotDetec (20 @ 13:30)  Procalcitonin, Serum: 0.10 (20 @ 13:15)  Rapid RVP Result: NotDetec (10-14-20 @ 18:06)      INFLAMMATORY MARKERS  C-Reactive Protein, Serum: 1.88 mg/dL (20 @ 13:15)      RADIOLOGY & ADDITIONAL TESTS:  I have personally reviewed the last available Chest xray  CXR      CT      CARDIOLOGY TESTING  12 Lead ECG:   Ventricular Rate 72 BPM    Atrial Rate 72 BPM    P-R Interval 142 ms    QRS Duration 102 ms    Q-T Interval 398 ms    QTC Calculation(Bazett) 435 ms    R Axis 177 degrees    T Axis 98 degrees    Diagnosis Line Normal sinus rhythm  limb lead reversal possible  Right axis deviation  T wave abnormality, consider lateral ischemia  Abnormal ECG    Confirmed by SREE LLAMAS MD ([id number]) on 2020 11:36:41 PM (20 @ 22:13)  12 Lead ECG:   Ventricular Rate 72 BPM    Atrial Rate 72 BPM    P-R Interval 148 ms    QRS Duration 92 ms    Q-T Interval 394 ms    QTC Calculation(Bazett) 431 ms    R Axis 176 degrees    T Axis 100 degrees    Diagnosis Line Normal sinus rhythm  limb lead reversal likely suggest repeat ekg  T wave abnormality, consider lateral ischemia  Abnormal ECG    Confirmed by SREE LLAMAS MD ([id number]) on 2020 11:35:02 PM (20 @ 22:12)      MEDICATIONS  ALBUTerol    90 MICROgram(s) HFA Inhaler 2 Inhalation every 6 hours  aspirin  chewable 81 Oral daily  atorvastatin 20 Oral at bedtime  budesonide 160 MICROgram(s)/formoterol 4.5 MICROgram(s) Inhaler 2 Inhalation two times a day  chlorhexidine 4% Liquid 1 Topical <User Schedule>  clopidogrel Tablet 75 Oral daily  dabigatran 150 Oral every 12 hours  dexAMETHasone  Injectable 6 IV Push daily  famotidine    Tablet 20 Oral daily  gabapentin 300 Oral three times a day  ipratropium 17 MICROgram(s) HFA Inhaler 1 Inhalation every 6 hours  lamoTRIgine 100 Oral daily  metoprolol tartrate 12.5 Oral every 8 hours  QUEtiapine 200 Oral at bedtime  remdesivir  IVPB 100 IV Intermittent every 24 hours  tiotropium 18 MICROgram(s) Capsule 1 Inhalation daily      WEIGHT  Weight (kg): 84.4 (20 @ 14:30)  Creatinine, Serum: 0.8 mg/dL (20 @ 02:00)  Creatinine, Serum: 0.9 mg/dL (20 @ 16:29)      ANTIBIOTICS:  remdesivir  IVPB 100 milliGRAM(s) IV Intermittent every 24 hours      All available historical records have been reviewed

## 2020-12-09 NOTE — PROGRESS NOTE ADULT - SUBJECTIVE AND OBJECTIVE BOX
Neurocritical Care Progress Note    SHAUN COATES    75y     [id number]   COVID-19 PCR: Detected (07 Dec 2020 12:30)  COVID-19 PCR: NotDetec (2020 13:30)  SARS-CoV-2: NotDetec (14 Oct 2020 18:06)    Patient is a 75y old  Female who presents with a chief complaint of Odynophagia associated with chest pain (09 Dec 2020 09:02)    HPI:  Chief Complaint: Chest pain after swallowing     Past Medical History: COPD on 5L home O2, paroxysmal afib, carotid stenosis, HFpEF, HTN, HLD, Bipolar disorder    Past Surgical History: None relevant     History of present illness goes back to 6AM on the morning of presentation when the patient first noticed chest pain that was associated with swallowing. She states that she was in her usual state of health the night prior. Of note, she lives with her daughter who is currently in their home quarantining, but the patient states her daughter is not doing a good job and was concerned that she might have COVID, so she went to urgent care and got tested, results pending. Her chest pain was described as severe when she swallowed, radiating to her neck and shoulder so she called EMS and was brought to the ED.      In the ED, her vitals were normal. EMS reported that in transit there was some anterior leads that showed TWI, but repeat EKG in the ED did not show any changes. CXR did however show a LLL opacity, increased from last month's CXR representing a possible developing pneumonia. She also wanted to come to the ED because she is planning to undergo a carotid endarterectomy on 12/15 and was concerned that uf she was ill, she would need to postpone the procedure. Rapid COVID was negative, PCR pending. Admitted by ED for ACS rule-out.     ROS: Denies headache, changes in vision, chest pain, palpitations, shortness of breath, cough, fever, chills, nausea, vomiting, diarrhea, and constipation.  (2020 16:06)    Allergies:  codeine (Other (Moderate))  Depakote (Unknown)  Dilaudid (Short breath; Rash)  IV Contrast (Anaphylaxis)  losartan (Angioedema)  Risperdal (Other)  verapamil (Short breath; Angioedema)    PAST MEDICAL & SURGICAL HISTORY:  Falls    Congestive heart failure    Transient ischemic attack    FLAVIO on CPAP    Bipolar 1 disorder    Allergic reaction    PVD (peripheral vascular disease)    Other emphysema    Other cardiomyopathy    TIA (transient ischemic attack)    COPD (chronic obstructive pulmonary disease)    Depression    Hypertension    History of cholecystectomy    Home Medications:  amLODIPine 2.5 mg oral tablet: 1 tab(s) orally once a day (2020 16:46)  aspirin 81 mg oral tablet: 1 tab(s) orally once a day (2020 16:46)  atorvastatin 20 mg oral tablet: 1 tab(s) orally once a day (2020 16:46)  budesonide-formoterol 160 mcg-4.5 mcg/inh inhalation aerosol: 2 puff(s) inhaled 2 times a day (2020 16:46)  furosemide 40 mg oral tablet: 1 tab(s) orally once a day (2020 16:46)  lamoTRIgine 100 mg oral tablet, extended release: 1 tab(s) orally once a day (2020 16:46)  LORazepam 0.5 mg oral tablet: 1 tab(s) orally 2 times a day, As Needed (2020 16:46)  Metoprolol Tartrate 100 mg oral tablet: 1 tab(s) orally 2 times a day (2020 16:46)  montelukast 10 mg oral tablet: 1 tab(s) orally once a day (2020 16:46)  Pepcid 20 mg oral tablet: 1 tab(s) orally once a day (2020 16:46)  Plavix 75 mg oral tablet: 1 tab(s) orally once a day (2020 16:46)  QUEtiapine 200 mg oral tablet: 1 tab(s) orally once a day (at bedtime) (2020 16:46)  tiotropium 18 mcg inhalation capsule: 1 cap(s) inhaled once a day (02 Dec 2020 13:51)    24 Hour Events:    Exam:    Current Medications:  acetaminophen   Tablet .. 650 milliGRAM(s) Oral every 6 hours PRN  ALBUTerol    90 MICROgram(s) HFA Inhaler 2 Puff(s) Inhalation every 6 hours  aspirin  chewable 81 milliGRAM(s) Oral daily  atorvastatin 20 milliGRAM(s) Oral at bedtime  bisacodyl 5 milliGRAM(s) Oral every 12 hours PRN  budesonide 160 MICROgram(s)/formoterol 4.5 MICROgram(s) Inhaler 2 Puff(s) Inhalation two times a day  chlorhexidine 4% Liquid 1 Application(s) Topical <User Schedule>  clopidogrel Tablet 75 milliGRAM(s) Oral daily  dabigatran 150 milliGRAM(s) Oral every 12 hours  dexAMETHasone  Injectable 6 milliGRAM(s) IV Push daily  famotidine    Tablet 20 milliGRAM(s) Oral daily  gabapentin 300 milliGRAM(s) Oral three times a day  ipratropium 17 MICROgram(s) HFA Inhaler 1 Puff(s) Inhalation every 6 hours  lamoTRIgine 100 milliGRAM(s) Oral daily  LORazepam     Tablet 0.5 milliGRAM(s) Oral every 6 hours PRN  metoprolol tartrate 12.5 milliGRAM(s) Oral every 8 hours  QUEtiapine 200 milliGRAM(s) Oral at bedtime  remdesivir  IVPB 100 milliGRAM(s) IV Intermittent every 24 hours  tiotropium 18 MICROgram(s) Capsule 1 Capsule(s) Inhalation daily    mRS:  0 No symptoms at all  1 No significant disability despite symptoms; able to carry out all usual duties and activities without assistance  2 Slight disability; unable to carry out all previous activities, but able to look after own affairs  3 Moderate disability; requiring some help, but able to walk without assistance  4 Moderately severe disability; unable to walk without assistance and unable to attend to own bodily needs without assistance  5 Severe disability; bedridden, incontinent and requiring constant nursing care and attention  6 Dead    Vital Signs Last 24 Hrs  T(C): 36.2 (09 Dec 2020 08:00), Max: 37.4 (08 Dec 2020 12:00)  T(F): 97.2 (09 Dec 2020 08:00), Max: 99.3 (08 Dec 2020 12:00)  HR: 74 (09 Dec 2020 09:15) (68 - 95)  BP: 155/70 (09 Dec 2020 09:15) (101/50 - 188/78)  BP(mean): 101 (09 Dec 2020 09:15) (74 - 112)  RR: 20 (09 Dec 2020 08:00) (16 - 22)  SpO2: 93% (09 Dec 2020 09:15) (90% - 97%)     I/Os:  I&O's Detail    08 Dec 2020 07:01  -  09 Dec 2020 07:00  --------------------------------------------------------  IN:    IV PiggyBack: 250 mL    Oral Fluid: 750 mL    Plasma: 562 mL  Total IN: 1562 mL    OUT:    Voided (mL): 2050 mL  Total OUT: 2050 mL    Total NET: -488 mL    Labs:  ABG - ( 08 Dec 2020 15:57 )  pH, Arterial: 7.42  pH, Blood: x     /  pCO2: 38    /  pO2: 64    / HCO3: 24    / Base Excess: -0.1  /  SaO2: 94        Lactate, Blood: 1.0 mmol/L ( @ 23:50)     Urinalysis Basic - ( 07 Dec 2020 13:02 )    Color: Yellow / Appearance: Clear / S.022 / pH: x  Gluc: x / Ketone: Negative  / Bili: Negative / Urobili: <2 mg/dL   Blood: x / Protein: Trace / Nitrite: Negative   Leuk Esterase: Small / RBC: 71 /HPF / WBC 6 /HPF   Sq Epi: x / Non Sq Epi: 2 /HPF / Bacteria: Few                     9.6    3.71  )-----------( 162      ( 09 Dec 2020 02:00 )             30.2      -    139  |  108  |  15  ----------------------------<  148<H>  4.5   |  21  |  0.8    Ca    8.6      09 Dec 2020 02:00  Phos  3.7       Mg     2.2         PT/INR - ( 09 Dec 2020 02:00 )   PT: 14.10 sec;   INR: 1.23 ratio      PTT - ( 09 Dec 2020 02:00 )  PTT:41.9 sec  Adult Advanced Hemodynamics Last 24 Hrs  CVP(mm Hg): --  CVP(cm H2O): --  CO: --  CI: --  PA: --  PA(mean): --  PCWP: --  SVR: --  SVRI: --  PVR: --  PVRI: --  CARDIAC MARKERS ( 07 Dec 2020 10:05 )  x     / <0.01 ng/mL / x     / x     / 1.3 ng/mL    Diagnostics/ Results:  Last CTH:  < from: CT Brain Stroke Protocol (20 @ 09:38) >  Impression:    No evidence of acute intracranial abnormality.    Stable mild chronic microvascular ischemic changes.    < end of copied text >    Last CTA/MRA: n/a    Last CTP: < from: CT Perfusion w/ Maps w/ IV Cont (20 @ 10:25) >  IMPRESSION:    CT PERFUSION:  Normal perfusion images of the brain.    CTA head/neck:  Interval left carotid artery stent placement extending from the carotid bulb to the proximal ICA with intact intraluminal flow and expected postoperative changes. Mild narrowing within the stent measuring 0.3 cm.    No evidence of large vessel occlusion or aneurysm.    Redemonstration of left parotid mass.    Severe extensive emphysematous changes of the lungs.    < end of copied text >    Last MRI: < from: MR Head No Cont (20 @ 13:33) >  IMPRESSION:  No acute infarct, acute intracranial hemorrhage, or mass effect.    < end of copied text >    Last TCD: n/a    Last EEG: < from: EEG (20 @ 10:30) >  PDR  Continuous  Background: 6-7 hz    Generalized Slowing  Yes  mild - moderate    Focal Slowing  No    Breach Artifact:  No    Activation Procedure  Hyper Ventilation  No    Photic Stimulation  No    Epileptiform Activity  No    Events:  No      Impression  Technically suboptimal, but readable segments show abnormal segments  generalized slowing as above      Clinical Correlation & Recommendations  Consistent with diffuse cerebral electrophysiological dysfunction secondary to nonspecific cause.    < end of copied text >    Last Echo: < from: TTE Echo Complete w/o Contrast w/ Doppler (20 @ 08:52) >  Summary:   1. Hyperdynamic global left ventricular systolic function.   2. LV Ejection Fraction by Scott's Method with a biplane EF of 70 %.   3. Moderately increased LV wall thickness. LVOT gradient of   30 mmHg noted.   4. Spectral Doppler shows impaired relaxation pattern of left ventricular myocardial filling (Grade I diastolic dysfunction).   5. Mildly enlarged left atrium.   6.Normal right atrial size.   7. Thickening and calcification of the anterior and posterior mitral valve leaflets.   8. Trace mitral valve regurgitation.   9. Mitral annular calcification.    PHYSICIAN INTERPRETATION:  Left Ventricle: The left ventricular internal cavity size is normal. Left ventricular wall thickness is moderately increased. Global LV systolic function was hyperdynamic. Spectral Doppler shows impaired relaxation pattern of left ventricular myocardial filling (Grade I diastolic dysfunction).  Right Ventricle: Normal right ventricular size and function.  Left Atrium: Mildly enlarged left atrium.  Right Atrium: Normal right atrial size.  Pericardium: There is no evidence of pericardial effusion.  Mitral Valve: Thickening and calcification of the anterior and posterior mitral valve leaflets. There is mitral annular calcification. No evidence of mitral stenosis. Trace mitral valve regurgitation is seen.  Tricuspid Valve: Trivial tricuspid regurgitation is visualized.  Aortic Valve: No evidence of aortic stenosis. Aortic valve thickening with normal leaflet opening. Trivial aortic valve regurgitation is seen.  Pulmonic Valve: No indication of pulmonic valve regurgitation.  Aorta: The aortic root is normal in size and structure.    < end of copied text >    Last EKG: < from: 12 Lead ECG (20 @ 22:13) >  Diagnosis Line Normal sinus rhythm  limb lead reversal possible  Right axis deviation  T wave abnormality, consider lateral ischemia  Abnormal ECG    < end of copied text >    Chest Xray:  < from: Xray Chest 1 View- PORTABLE-Routine (20 @ 06:32) >  Impression:    Diffuse bilateral pulmonary opacities increased from prior.    < end of copied text >    ROS:  [X] A ten-point ROS was otherwise negative except as noted in HPI    Impression:    Assessment:      Plan:  Neurology:      Respiratory:      Cardiology:      Vascular:      GI/Nutrition:  Diet, Consistent Carbohydrate w/Evening Snack:   Supplement Feeding Modality:  Oral  Glucerna Shake Cans or Servings Per Day:  1       Frequency:  Two Times a day (20 @ 10:13) [Active]    Bowel Regimen ->  PPx -> Pantoprazole       / Renal/ Fluids/ Electrolytes:      Hematology/ Oncology:  DVT PPx -> Haparin SQ    Lovenox SQ    Haparin gtt      Infectious Disease:      Musculoskeletol:      Endocrine:      Tubes/ Lines/ Drains:  Central    Peripheral    A-line    Freeman    NGT/OGT    Chest    EVD    Disposition:  Continue medical management as per primary team in:   ICU   CCU   SICU   Stroke Unit   Stepdown    CODE STATUS w/ Next of Kin:   DNI   DNR   FULL    Patient seen and case discussed with NCC attending; see attending attestation  Please call w/ questions  Lolly Sparks NP  [phone]             Neurocritical Care Progress Note    SHAUN COATES    75y     [id number]   COVID-19 PCR: Detected (07 Dec 2020 12:30)  COVID-19 PCR: NotDetec (2020 13:30)  SARS-CoV-2: NotDetec (14 Oct 2020 18:06)    Patient is a 75y old  Female who presents with a chief complaint of Odynophagia associated with chest pain (09 Dec 2020 09:02)    HPI:  Chief Complaint: Chest pain after swallowing     Past Medical History: COPD on 5L home O2, paroxysmal afib, carotid stenosis, HFpEF, HTN, HLD, Bipolar disorder    Past Surgical History: None relevant     History of present illness goes back to 6AM on the morning of presentation when the patient first noticed chest pain that was associated with swallowing. She states that she was in her usual state of health the night prior. Of note, she lives with her daughter who is currently in their home quarantining, but the patient states her daughter is not doing a good job and was concerned that she might have COVID, so she went to urgent care and got tested, results pending. Her chest pain was described as severe when she swallowed, radiating to her neck and shoulder so she called EMS and was brought to the ED.      In the ED, her vitals were normal. EMS reported that in transit there was some anterior leads that showed TWI, but repeat EKG in the ED did not show any changes. CXR did however show a LLL opacity, increased from last month's CXR representing a possible developing pneumonia. She also wanted to come to the ED because she is planning to undergo a carotid endarterectomy on 12/15 and was concerned that uf she was ill, she would need to postpone the procedure. Rapid COVID was negative, PCR pending. Admitted by ED for ACS rule-out.     ROS: Denies headache, changes in vision, chest pain, palpitations, shortness of breath, cough, fever, chills, nausea, vomiting, diarrhea, and constipation.  (2020 16:06)    Allergies:  codeine (Other (Moderate))  Depakote (Unknown)  Dilaudid (Short breath; Rash)  IV Contrast (Anaphylaxis)  losartan (Angioedema)  Risperdal (Other)  verapamil (Short breath; Angioedema)    PAST MEDICAL & SURGICAL HISTORY:  Falls    Congestive heart failure    Transient ischemic attack    FLAVIO on CPAP    Bipolar 1 disorder    Allergic reaction    PVD (peripheral vascular disease)    Other emphysema    Other cardiomyopathy    TIA (transient ischemic attack)    COPD (chronic obstructive pulmonary disease)    Depression    Hypertension    History of cholecystectomy    Home Medications:  amLODIPine 2.5 mg oral tablet: 1 tab(s) orally once a day (2020 16:46)  aspirin 81 mg oral tablet: 1 tab(s) orally once a day (2020 16:46)  atorvastatin 20 mg oral tablet: 1 tab(s) orally once a day (2020 16:46)  budesonide-formoterol 160 mcg-4.5 mcg/inh inhalation aerosol: 2 puff(s) inhaled 2 times a day (2020 16:46)  furosemide 40 mg oral tablet: 1 tab(s) orally once a day (2020 16:46)  lamoTRIgine 100 mg oral tablet, extended release: 1 tab(s) orally once a day (2020 16:46)  LORazepam 0.5 mg oral tablet: 1 tab(s) orally 2 times a day, As Needed (2020 16:46)  Metoprolol Tartrate 100 mg oral tablet: 1 tab(s) orally 2 times a day (2020 16:46)  montelukast 10 mg oral tablet: 1 tab(s) orally once a day (2020 16:46)  Pepcid 20 mg oral tablet: 1 tab(s) orally once a day (2020 16:46)  Plavix 75 mg oral tablet: 1 tab(s) orally once a day (2020 16:46)  QUEtiapine 200 mg oral tablet: 1 tab(s) orally once a day (at bedtime) (2020 16:46)  tiotropium 18 mcg inhalation capsule: 1 cap(s) inhaled once a day (02 Dec 2020 13:51)    24 Hour Events:  --> Patient expressive aphasia greatly improved. OK for downgrade to medical floor when approved medically     Exam:  Neurologic Exam:  Mental status: Awake, alert and oriented to person, place, and time. Attention and concentration intact. Fund of knowledge appropriate  Language: Naming, repetition, fluency, and comprehension intact. No dysarthria, no aphasia. Memory may be slightly impaired, but suggested follow up with neuro clinic after discharge if memory impairment continues   Cranial nerves: Pupils equally round and reactive to light. Visual fields full. No nystagmus. Extraocular muscles intact, V1 through V3 intact bilaterally and symmetric,. Face symmetric. Hearing intact   Motor:  Normal bulk and tone. Strength:    5/5 in RUE  5/5 in LUE  5/5 in RLE  5/5 in LUE   strength 5/5  Motor: Rapid alternating movements intact and symmetric. No tremors or tics noted  Sensation: Intact to light touch, proprioception, and noxious stimuli. No neglect noted  Coordination: No dysmetria on finger-to-nose and heel-to-shin. No clumsiness noted  Reflexes: 2+ generally     Current Medications:  acetaminophen   Tablet .. 650 milliGRAM(s) Oral every 6 hours PRN  ALBUTerol    90 MICROgram(s) HFA Inhaler 2 Puff(s) Inhalation every 6 hours  aspirin  chewable 81 milliGRAM(s) Oral daily  atorvastatin 20 milliGRAM(s) Oral at bedtime  bisacodyl 5 milliGRAM(s) Oral every 12 hours PRN  budesonide 160 MICROgram(s)/formoterol 4.5 MICROgram(s) Inhaler 2 Puff(s) Inhalation two times a day  chlorhexidine 4% Liquid 1 Application(s) Topical <User Schedule>  clopidogrel Tablet 75 milliGRAM(s) Oral daily  dabigatran 150 milliGRAM(s) Oral every 12 hours  dexAMETHasone  Injectable 6 milliGRAM(s) IV Push daily  famotidine    Tablet 20 milliGRAM(s) Oral daily  gabapentin 300 milliGRAM(s) Oral three times a day  ipratropium 17 MICROgram(s) HFA Inhaler 1 Puff(s) Inhalation every 6 hours  lamoTRIgine 100 milliGRAM(s) Oral daily  LORazepam     Tablet 0.5 milliGRAM(s) Oral every 6 hours PRN  metoprolol tartrate 12.5 milliGRAM(s) Oral every 8 hours  QUEtiapine 200 milliGRAM(s) Oral at bedtime  remdesivir  IVPB 100 milliGRAM(s) IV Intermittent every 24 hours  tiotropium 18 MICROgram(s) Capsule 1 Capsule(s) Inhalation daily    mRS:  0 No symptoms at all  1 No significant disability despite symptoms; able to carry out all usual duties and activities without assistance  2 Slight disability; unable to carry out all previous activities, but able to look after own affairs  3 Moderate disability; requiring some help, but able to walk without assistance  4 Moderately severe disability; unable to walk without assistance and unable to attend to own bodily needs without assistance  5 Severe disability; bedridden, incontinent and requiring constant nursing care and attention  6 Dead    Vital Signs Last 24 Hrs  T(C): 36.2 (09 Dec 2020 08:00), Max: 37.4 (08 Dec 2020 12:00)  T(F): 97.2 (09 Dec 2020 08:00), Max: 99.3 (08 Dec 2020 12:00)  HR: 74 (09 Dec 2020 09:15) (68 - 95)  BP: 155/70 (09 Dec 2020 09:15) (101/50 - 188/78)  BP(mean): 101 (09 Dec 2020 09:15) (74 - 112)  RR: 20 (09 Dec 2020 08:00) (16 - 22)  SpO2: 93% (09 Dec 2020 09:15) (90% - 97%)     I/Os:  I&O's Detail    08 Dec 2020 07:01  -  09 Dec 2020 07:00  --------------------------------------------------------  IN:    IV PiggyBack: 250 mL    Oral Fluid: 750 mL    Plasma: 562 mL  Total IN: 1562 mL    OUT:    Voided (mL): 2050 mL  Total OUT: 2050 mL    Total NET: -488 mL    Labs:  ABG - ( 08 Dec 2020 15:57 )  pH, Arterial: 7.42  pH, Blood: x     /  pCO2: 38    /  pO2: 64    / HCO3: 24    / Base Excess: -0.1  /  SaO2: 94        Lactate, Blood: 1.0 mmol/L ( @ 23:50)     Urinalysis Basic - ( 07 Dec 2020 13:02 )    Color: Yellow / Appearance: Clear / S.022 / pH: x  Gluc: x / Ketone: Negative  / Bili: Negative / Urobili: <2 mg/dL   Blood: x / Protein: Trace / Nitrite: Negative   Leuk Esterase: Small / RBC: 71 /HPF / WBC 6 /HPF   Sq Epi: x / Non Sq Epi: 2 /HPF / Bacteria: Few                     9.6    3.71  )-----------( 162      ( 09 Dec 2020 02:00 )             30.2      12    139  |  108  |  15  ----------------------------<  148<H>  4.5   |  21  |  0.8    Ca    8.6      09 Dec 2020 02:00  Phos  3.7     12  Mg     2.2     12-    PT/INR - ( 09 Dec 2020 02:00 )   PT: 14.10 sec;   INR: 1.23 ratio      PTT - ( 09 Dec 2020 02:00 )  PTT:41.9 sec  Adult Advanced Hemodynamics Last 24 Hrs  CVP(mm Hg): --  CVP(cm H2O): --  CO: --  CI: --  PA: --  PA(mean): --  PCWP: --  SVR: --  SVRI: --  PVR: --  PVRI: --  CARDIAC MARKERS ( 07 Dec 2020 10:05 )  x     / <0.01 ng/mL / x     / x     / 1.3 ng/mL    Diagnostics/ Results:  Last CTH:  < from: CT Brain Stroke Protocol (20 @ 09:38) >  Impression:    No evidence of acute intracranial abnormality.    Stable mild chronic microvascular ischemic changes.    < end of copied text >    Last CTA/MRA: n/a    Last CTP: < from: CT Perfusion w/ Maps w/ IV Cont (20 @ 10:25) >  IMPRESSION:    CT PERFUSION:  Normal perfusion images of the brain.    CTA head/neck:  Interval left carotid artery stent placement extending from the carotid bulb to the proximal ICA with intact intraluminal flow and expected postoperative changes. Mild narrowing within the stent measuring 0.3 cm.    No evidence of large vessel occlusion or aneurysm.    Redemonstration of left parotid mass.    Severe extensive emphysematous changes of the lungs.    < end of copied text >    Last MRI: < from: MR Head No Cont (20 @ 13:33) >  IMPRESSION:  No acute infarct, acute intracranial hemorrhage, or mass effect.    < end of copied text >    Last TCD: n/a    Last EEG: < from: EEG (20 @ 10:30) >  PDR  Continuous  Background: 6-7 hz    Generalized Slowing  Yes  mild - moderate    Focal Slowing  No    Breach Artifact:  No    Activation Procedure  Hyper Ventilation  No    Photic Stimulation  No    Epileptiform Activity  No    Events:  No      Impression  Technically suboptimal, but readable segments show abnormal segments  generalized slowing as above      Clinical Correlation & Recommendations  Consistent with diffuse cerebral electrophysiological dysfunction secondary to nonspecific cause.    < end of copied text >    Last Echo: < from: TTE Echo Complete w/o Contrast w/ Doppler (20 @ 08:52) >  Summary:   1. Hyperdynamic global left ventricular systolic function.   2. LV Ejection Fraction by Scott's Method with a biplane EF of 70 %.   3. Moderately increased LV wall thickness. LVOT gradient of   30 mmHg noted.   4. Spectral Doppler shows impaired relaxation pattern of left ventricular myocardial filling (Grade I diastolic dysfunction).   5. Mildly enlarged left atrium.   6.Normal right atrial size.   7. Thickening and calcification of the anterior and posterior mitral valve leaflets.   8. Trace mitral valve regurgitation.   9. Mitral annular calcification.    PHYSICIAN INTERPRETATION:  Left Ventricle: The left ventricular internal cavity size is normal. Left ventricular wall thickness is moderately increased. Global LV systolic function was hyperdynamic. Spectral Doppler shows impaired relaxation pattern of left ventricular myocardial filling (Grade I diastolic dysfunction).  Right Ventricle: Normal right ventricular size and function.  Left Atrium: Mildly enlarged left atrium.  Right Atrium: Normal right atrial size.  Pericardium: There is no evidence of pericardial effusion.  Mitral Valve: Thickening and calcification of the anterior and posterior mitral valve leaflets. There is mitral annular calcification. No evidence of mitral stenosis. Trace mitral valve regurgitation is seen.  Tricuspid Valve: Trivial tricuspid regurgitation is visualized.  Aortic Valve: No evidence of aortic stenosis. Aortic valve thickening with normal leaflet opening. Trivial aortic valve regurgitation is seen.  Pulmonic Valve: No indication of pulmonic valve regurgitation.  Aorta: The aortic root is normal in size and structure.    < end of copied text >    Last EKG: < from: 12 Lead ECG (20 @ 22:13) >  Diagnosis Line Normal sinus rhythm  limb lead reversal possible  Right axis deviation  T wave abnormality, consider lateral ischemia  Abnormal ECG    < end of copied text >    Chest Xray:  < from: Xray Chest 1 View- PORTABLE-Routine (20 @ 06:32) >  Impression:    Diffuse bilateral pulmonary opacities increased from prior.    < end of copied text >    ROS:  [X] A ten-point ROS was otherwise negative except as noted in HPI    Impression:  -Paroxysmal A-fib w/ a flutter   -Carotid stenosis; s/p TCAR  -Stroke in the setting of TCAR  -CHF    Assessment:  76 yo LHF w/ h/o COPD. Depression/BPD, CHF, HTN, FLAVIO, PVD, TIA currently s/p L carotid revascularization w/ stenting currently found on  with difficulty speaking without obvious signs of stroke. All diagnostics (CTH, CTP, EEG) negative for pathology. Expressive aphasia no longer noted. Possible small stroke, most likely in the setting of TCAR. No change in management or plan at this time. Continue DAPT, psych meds, and plan as per vascular. Neurocritical will sign off.     Plan:  Neurology:  -Stroke code  for aphasia; difficult word finding   -CTP-->  No evidence of large vessel occlusion or aneurysm  -CTH-->  No evidence of acute intracranial abnormality  -MRI-->  No acute infarct, acute intracranial hemorrhage, or mass effect  -EEG-->  generalized slowing only  -Psych meds bipolar; resume from home: Seroquel/ Lamictal/ Neurontin/ Ativan   -Neurologically stable and OK for downgrade to medical floor. Expressive aphasia now absent     Respiratory:  -Aspiration precautions; keep HOB > 45  -COPD; Spiriva/ albuterol/ decadron/ atrovent  -COVID+--> Remdesivir x4 days    Cardiology:  -Echo-->  Mildly enlarged left atrium.  -ASA 81mg / plavix 75mg / Pradaxa 150mg  -CHF; lasix  -Paroxysmal a-fib; lopressor     Vascular:  -Carotid stenosis; s/p L carotid revascularization w/ stenting (TCAR)   -Duplex b/l LE-->  negative for DVT    GI/Nutrition:  -Diet, Consistent Carbohydrate w/Evening Snack  -PPx -> pepcid  -Bowel regimen-> dulcolax PRN    / Renal/ Fluids/ Electrolytes:  -Keep hydrated. Monitor lytes and correct PRN     Hematology/ Oncology:  -DVT PPx -> Haparin SQ    Lovenox SQ    Haparin gtt    SCDs    Infectious Disease:  -Avoid fevers; tylenol PRN     Musculoskeletol:  -OOB--> chair    Endocrine:  -Lipitor 20mg     Tubes/ Lines/ Drains:  Central    Peripheral    A-line    Freeman    NGT/OGT    Chest    EVD    Disposition:  Continue medical management as per primary team in:   ICU   CCU   SICU   Stroke Unit   Stepdown    CODE STATUS w/ Next of Kin: Juno (spouse) [phone]  DNI   DNR   FULL    Patient seen and case discussed with NCC attending; see attending attestation  Please call w/ questions  Lolly Sparks NP  [id number]

## 2020-12-09 NOTE — CHART NOTE - NSCHARTNOTEFT_GEN_A_CORE
Registered Dietitian Follow-Up     Patient Profile Reviewed                           Yes [x]   No []     Nutrition History Previously Obtained        Yes []  No [x]--was able to obtain today, pt reports excellent appetite/po intake, typically consumes 2-3 meals w/ occasional snacks daily and denies use of oral nutrition supplements. Pt with no cultural/Taoism restrictions and has NKFA. UBW ~187#, denies any recent wt changes.      Pertinent Subjective Information: Pt states that she has a good appetite and eats most of her meals when she receives what she likes. However, for the past couple of days, pt has been receiving meals that are cold and not what she asked for. Spoke with  to provide pt w/ menu and relayed this to RN, so that they can provide pt w/ paper to write down all her meal choices for the day. Also, pt reports not receiving oral supplement on meal trays; kitchen has been notified.      Pertinent Medical Interventions: Pt s/p left TCAR, right groin access 2/2 >80% stenosis of left ICA, recent TIA, stroke code negative w/u. Now covid +, ID consulted, CV plasma given, remdesivir given x4 days.     Diet order: CHO Consistent/HS + Glucerna BID      Anthropometrics:  - Ht. 62"  - Wt. 199#/90.1kg (12/8)--wt trending upwards, will to continue to monitor   (12/6):193#/87.7kg   (12/4): 186#/84.4kg--dosing wt   (12/1): 188#/85.5kg   - %wt change  - BMI: 34.0 using dosing wt   - IBW: 110#      Pertinent Lab Data: (12/9): H/H 9.6/30.2, Gluc 148      Pertinent Meds: aspirin, plavix, remdesivir, dulcolax, albuterol, lopressor, lipitor, symbicort, pepcid, spiriva      Physical Findings:  - Appearance: alert and oriented; 1+ edema to B/L foot  - GI function: LBM 12/6   - Tubes: N/A   - Oral/Mouth cavity: none reported   - Skin: surgical incision      Nutrition Requirements  Weight Used: ABW: 84.4kg,  IBW: 50kg; needs continued from RD note on 12/6      CALORIE: 7569-2954 kcal/day (MSJ x 1.1-1.2)   PROTEIN: 50-70 g/day (1.2-1.4 g/kg of ABW)     FLUID: fluid per BURN team     Nutrient Intake: not meeting kcal/pro needs at this time      Previous Nutrition Diagnosis: Inadequate Oral Intake (ongoing)      Nutrition Intervention: meals and snacks, medical food supplements    Recommendations:  1. Continue current diet order and oral supplementation      Goal/Expected Outcome: Pt to consume >75% po intake of meals, snacks, and supplements within 3 days      Indicator/Monitoring: RD to monitor energy intake, glucose profile, body composition, NFPF

## 2020-12-09 NOTE — PROGRESS NOTE ADULT - SUBJECTIVE AND OBJECTIVE BOX
SHAUN COATES  [id number]  75y Female    Indication for ICU admission: L TCAR , POD # 1 stroke code    Admit Date:11-29-20  ICU Date: 12/4  OR Date: 12/4     codeine (Other (Moderate))  Depakote (Unknown)  Dilaudid (Short breath; Rash)  IV Contrast (Anaphylaxis)  losartan (Angioedema)  Risperdal (Other)  verapamil (Short breath; Angioedema)    PAST MEDICAL & SURGICAL HISTORY:  Falls    Congestive heart failure    Transient ischemic attack    FLAVIO on CPAP    Bipolar 1 disorder    Allergic reaction    PVD (peripheral vascular disease)    Other emphysema    Other cardiomyopathy    TIA (transient ischemic attack)    COPD (chronic obstructive pulmonary disease)    Depression    Hypertension    History of cholecystectomy      Home Medications:  amLODIPine 2.5 mg oral tablet: 1 tab(s) orally once a day (29 Nov 2020 16:46)  aspirin 81 mg oral tablet: 1 tab(s) orally once a day (29 Nov 2020 16:46)  atorvastatin 20 mg oral tablet: 1 tab(s) orally once a day (29 Nov 2020 16:46)  budesonide-formoterol 160 mcg-4.5 mcg/inh inhalation aerosol: 2 puff(s) inhaled 2 times a day (29 Nov 2020 16:46)  furosemide 40 mg oral tablet: 1 tab(s) orally once a day (29 Nov 2020 16:46)  lamoTRIgine 100 mg oral tablet, extended release: 1 tab(s) orally once a day (29 Nov 2020 16:46)  LORazepam 0.5 mg oral tablet: 1 tab(s) orally 2 times a day, As Needed (29 Nov 2020 16:46)  Metoprolol Tartrate 100 mg oral tablet: 1 tab(s) orally 2 times a day (29 Nov 2020 16:46)  montelukast 10 mg oral tablet: 1 tab(s) orally once a day (29 Nov 2020 16:46)  Pepcid 20 mg oral tablet: 1 tab(s) orally once a day (29 Nov 2020 16:46)  Plavix 75 mg oral tablet: 1 tab(s) orally once a day (29 Nov 2020 16:46)  QUEtiapine 200 mg oral tablet: 1 tab(s) orally once a day (at bedtime) (29 Nov 2020 16:46)  tiotropium 18 mcg inhalation capsule: 1 cap(s) inhaled once a day (02 Dec 2020 13:51)        24HRS EVENT:  DAY  COVID +  Change duonebs to inhailer  Continue metoprolol as long as SBP>100  DC abx  F/u ID recommendations  Off barb  started dexa 6 q24  Vasc sx team made aware of possible stent narrowing, awaiting recs  Per ID Convalescent plasma 200x1, Remdesivir 200 x1, Remdesivir 100 x4 days      Overnight:  Barb remains on     DVT PTX: ASA/Plavix, Pradaxa    GI PTX: Famotidine       ***Tubes/Lines/Drains  ***  Peripheral IV  Urinary Catheter		      REVIEW OF SYSTEMS    [x] A ten-point review of systems was otherwise negative except as noted.  [ ] Due to altered mental status/intubation, subjective information were not able to be obtained from the patient. History was obtained, to the extent possible, from review of the chart and collateral sources of information.

## 2020-12-09 NOTE — CONSULT NOTE ADULT - SUBJECTIVE AND OBJECTIVE BOX
INTERVENTIONAL RADIOLOGY CONSULT:     Procedure Requested: Left parotid mass biopsy.    HPI:  History of present illness goes back to 6AM on the morning of presentation when the patient first noticed chest pain that was associated with swallowing. She states that she was in her usual state of health the night prior. Of note, she lives with her daughter who is currently in their home quarantining, but the patient states her daughter is not doing a good job and was concerned that she might have COVID, so she went to urgent care and got tested, results pending. Her chest pain was described as severe when she swallowed, radiating to her neck and shoulder so she called EMS and was brought to the ED.      In the ED, her vitals were normal. EMS reported that in transit there was some anterior leads that showed TWI, but repeat EKG in the ED did not show any changes. CXR did however show a LLL opacity, increased from last month's CXR representing a possible developing pneumonia. She also wanted to come to the ED because she is planning to undergo a carotid endarterectomy on 12/15 and was concerned that uf she was ill, she would need to postpone the procedure. Rapid COVID was negative, PCR pending. Admitted by ED for ACS rule-out.     ROS: Denies headache, changes in vision, chest pain, palpitations, shortness of breath, cough, fever, chills, nausea, vomiting, diarrhea, and constipation.  (29 Nov 2020 16:06)    PAST MEDICAL & SURGICAL HISTORY:  Falls  Congestive heart failure  Transient ischemic attack  FLAVIO on CPAP  Bipolar 1 disorder  Allergic reaction  PVD (peripheral vascular disease)  Other emphysema  Other cardiomyopathy  TIA (transient ischemic attack)  COPD (chronic obstructive pulmonary disease)  Depression  Hypertension    History of cholecystectomy    MEDICATIONS  (STANDING):  ALBUTerol    90 MICROgram(s) HFA Inhaler 2 Puff(s) Inhalation every 6 hours  aspirin  chewable 81 milliGRAM(s) Oral daily  atorvastatin 20 milliGRAM(s) Oral at bedtime  budesonide 160 MICROgram(s)/formoterol 4.5 MICROgram(s) Inhaler 2 Puff(s) Inhalation two times a day  chlorhexidine 4% Liquid 1 Application(s) Topical <User Schedule>  clopidogrel Tablet 75 milliGRAM(s) Oral daily  dabigatran 150 milliGRAM(s) Oral every 12 hours  dexAMETHasone  Injectable 6 milliGRAM(s) IV Push daily  famotidine    Tablet 20 milliGRAM(s) Oral daily  furosemide    Tablet 40 milliGRAM(s) Oral daily  gabapentin 300 milliGRAM(s) Oral three times a day  ipratropium 17 MICROgram(s) HFA Inhaler 1 Puff(s) Inhalation every 6 hours  lamoTRIgine 100 milliGRAM(s) Oral daily  metoprolol tartrate 12.5 milliGRAM(s) Oral every 8 hours  multivitamin 1 Tablet(s) Oral daily  QUEtiapine 200 milliGRAM(s) Oral at bedtime  remdesivir  IVPB 100 milliGRAM(s) IV Intermittent every 24 hours  tiotropium 18 MICROgram(s) Capsule 1 Capsule(s) Inhalation daily    MEDICATIONS  (PRN):  acetaminophen   Tablet .. 650 milliGRAM(s) Oral every 6 hours PRN Temp greater or equal to 38.5C (101.3F), Mild Pain (1 - 3)  bisacodyl 5 milliGRAM(s) Oral every 12 hours PRN Constipation  LORazepam     Tablet 0.5 milliGRAM(s) Oral every 6 hours PRN Anxiety    Allergies  codeine (Other (Moderate))  Depakote (Unknown)  Dilaudid (Short breath; Rash)  IV Contrast (Anaphylaxis)  losartan (Angioedema)  Risperdal (Other)  verapamil (Short breath; Angioedema)    FAMILY HISTORY:  No pertinent family history in first degree relatives    Physical Exam:   Vital Signs Last 24 Hrs  T(C): 36 (09 Dec 2020 17:00), Max: 37.4 (08 Dec 2020 22:30)  T(F): 96.8 (09 Dec 2020 17:00), Max: 99.3 (08 Dec 2020 22:30)  HR: 82 (09 Dec 2020 17:00) (68 - 95)  BP: 118/62 (09 Dec 2020 17:00) (101/50 - 188/78)  BP(mean): 83 (09 Dec 2020 17:00) (74 - 114)  RR: 18 (09 Dec 2020 17:00) (16 - 24)  SpO2: 95% (09 Dec 2020 17:00) (86% - 98%)    Labs:                         9.6    3.71  )-----------( 162      ( 09 Dec 2020 02:00 )             30.2     12-09    139  |  108  |  15  ----------------------------<  148<H>  4.5   |  21  |  0.8    Ca    8.6      09 Dec 2020 02:00  Phos  3.7     12-09  Mg     2.2     12-09      PT/INR - ( 09 Dec 2020 02:00 )   PT: 14.10 sec;   INR: 1.23 ratio         PTT - ( 09 Dec 2020 02:00 )  PTT:41.9 sec    Pertinent labs:                      9.6    3.71  )-----------( 162      ( 09 Dec 2020 02:00 )             30.2       12-09    139  |  108  |  15  ----------------------------<  148<H>  4.5   |  21  |  0.8    Ca    8.6      09 Dec 2020 02:00  Phos  3.7     12-09  Mg     2.2     12-09    PT/INR - ( 09 Dec 2020 02:00 )   PT: 14.10 sec;   INR: 1.23 ratio      PTT - ( 09 Dec 2020 02:00 )  PTT:41.9 sec    Radiology & Additional Studies:   Radiology imaging reviewed.       ASSESSMENT/ PLAN:   74 yo F presenting with chest pain. During admission, patient found to have incidental 3 cm left parotid mass on radiologic imaging. Neuro interventional consulted for biopsy of the lesion. Given patient's current medical condition, will plan for outpatient follow up and biopsy.    Thank you for the courtesy of this consult.

## 2020-12-09 NOTE — CHART NOTE - NSCHARTNOTEFT_GEN_A_CORE
spoke with daughter bridget via phone [phone]  -patient remains on covid isolation  -patient to be transferred to med/surg floor   -still remains on supplemental oxygenation    All future calls to be made to spouse Juno ([phone]) as per patient

## 2020-12-09 NOTE — PROGRESS NOTE ADULT - ASSESSMENT
Assessment & Plan    75y Female HD#5 POD#0 s/p left TCAR, right groin access 2/2 >80% stenosis of left ICA, recent TIA    NEURO:    Acute pain-controlled with  Tylenol, avoid narcotics    s/p Left Tcar -q2 neurochecks    hx of TIA-on ASA  -stroke code - patient difficult word finding  -CT hd: No evidence of acute intracranial abnormality  -CT: Normal perfusion images of the brain.  -MRI brain: no acute infact  -keep SBP >110  -started on ASA/plavix/pradaxa  --EEG done -->  generalized slowing   -TSH, ammonia  -h/o Bipolar - restarted all psych meds, lamictal, seroquel, ativan prn    RESP:     Oxygen insufficiency-wean to 5L nc (on home 5L oxygen)    hx of COPD----On Albuterol inhalers     hx of smoking (quit 10+yrs)-encourage IS    Pulm c/s-recs triple inhaler therapy, keep sat >92%    Activity-OOB2C      CARDS:     SBP continue to keep goal 110, now off barb    Vasc sx team made aware of possible stent narrowing, awaiting recs    hx of paroxAfib -on pradaxa, on home metoprolol     hx of hld-restarted atorvastatin    Imaging: post op EKG NSR    Labs: CE x3 negative     GI/NUTR:     Diet-CC Regular    GI Prophylaxis-Pepcid (home rx)    /RENAL:     Monitor UO, voiding without difficulty    BUN/Cr- 22/0.9 stable    lytes:        HEME/ONC:     DVT prophylaxis- Pradaxa, ASA/Plavix    Hb/Hct: 10.7 > 10.1  -DVT sono: final  neg    ID:  WBC 5.7, tmax 101  D/C all abx  Per ID Convalexcent plasma 200 x1, Remdesivir 200x1, Remdesivir 100 x4 days   pan culture sent  f/u bld cx, COVID  UA negative    ENDO:  -RISS  -A1c 5.2%  Dexamethasone 6q24    LINES/DRAINS:  PIV, [place]    DISPO:    SICU

## 2020-12-09 NOTE — PROGRESS NOTE ADULT - SUBJECTIVE AND OBJECTIVE BOX
Progress Note: General Surgery  Patient: SHAUN COATES , 75y (1945)Female   MRN: [id number]  [facility]  Visit: 20 Inpatient  Date: 20 @ 08:28    Admit Diagnosis/Chief Complaint: Carotid stenosis, left    Procedure/Diagnosis: Carotid stenosis, left     S/P Carotid artery stenting     POD# 5    Events/ 24h: Patient still COVID+ and still on Neostigmine. Continuing SICU care for now.   ID saw the patient and recommended Remdesivir D1, as well as monitoring of Cr/LFTs, offering convalescent plasma, dexamethasone 6 mg.      Neurology saw the patient and diagnosed with a new broca-type aphasia, and are concerned about transient subtle hypoperfusion to Left hemisphere.    Vitals: T(F): 97.2 (20 @ 08:00), Max: 99.3 (20 @ 12:00)  HR: 80 (20 @ 08:00)  BP: 144/60 (20 @ 08:00) (96/50 - 188/78)  RR: 20 (20 @ 08:00)  SpO2: 94% (20 @ 08:00)    In:   20 @ 07:01  -  20 @ 07:00  --------------------------------------------------------  IN: 1562 mL      Out:   20 @ 07:01  -  20 @ 07:00  --------------------------------------------------------  OUT:    Voided (mL): 2050 mL  Total OUT: 2050 mL        Net:   20 @ 07:01  -  20 @ 07:00  --------------------------------------------------------  NET: -488 mL        Diet: Diet, Consistent Carbohydrate w/Evening Snack:   Supplement Feeding Modality:  Oral  Glucerna Shake Cans or Servings Per Day:  1       Frequency:  Two Times a day (20 @ 10:13)    Physical Examination:  General Appearance: NAD  HEENT: EOMI, sclera non-icteric.  Heart: RRR   Lungs: CTABL.   Abdomen:  Soft, nontender, nondistended.   MSK/Extremities: Warm & well-perfused.   Skin: Warm, dry. No jaundice.       Medications: [Standing]  ALBUTerol    90 MICROgram(s) HFA Inhaler 2 Puff(s) Inhalation every 6 hours  aspirin  chewable 81 milliGRAM(s) Oral daily  atorvastatin 20 milliGRAM(s) Oral at bedtime  budesonide 160 MICROgram(s)/formoterol 4.5 MICROgram(s) Inhaler 2 Puff(s) Inhalation two times a day  chlorhexidine 4% Liquid 1 Application(s) Topical <User Schedule>  clopidogrel Tablet 75 milliGRAM(s) Oral daily  dabigatran 150 milliGRAM(s) Oral every 12 hours  dexAMETHasone  Injectable 6 milliGRAM(s) IV Push daily  famotidine    Tablet 20 milliGRAM(s) Oral daily  gabapentin 300 milliGRAM(s) Oral three times a day  ipratropium 17 MICROgram(s) HFA Inhaler 1 Puff(s) Inhalation every 6 hours  lamoTRIgine 100 milliGRAM(s) Oral daily  metoprolol tartrate 12.5 milliGRAM(s) Oral every 8 hours  QUEtiapine 200 milliGRAM(s) Oral at bedtime  remdesivir  IVPB 100 milliGRAM(s) IV Intermittent every 24 hours  tiotropium 18 MICROgram(s) Capsule 1 Capsule(s) Inhalation daily    DVT Prophylaxis:   GI Prophylaxis: famotidine    Tablet 20 milliGRAM(s) Oral daily    Antibiotics: remdesivir  IVPB 100 milliGRAM(s) IV Intermittent every 24 hours    Anticoagulation:   Medications:[PRN]  acetaminophen   Tablet .. 650 milliGRAM(s) Oral every 6 hours PRN  bisacodyl 5 milliGRAM(s) Oral every 12 hours PRN  LORazepam     Tablet 0.5 milliGRAM(s) Oral every 6 hours PRN      Labs:                        9.6    3.71  )-----------( 162      ( 09 Dec 2020 02:00 )             30.2     12-    139  |  108  |  15  ----------------------------<  148<H>  4.5   |  21  |  0.8    Ca    8.6      09 Dec 2020 02:00  Phos  3.7       Mg     2.2             PT/INR - ( 09 Dec 2020 02:00 )   PT: 14.10 sec;   INR: 1.23 ratio         PTT - ( 09 Dec 2020 02:00 )  PTT:41.9 sec  ABG - ( 08 Dec 2020 15:57 )  pH: 7.42  /  pCO2: 38    /  pO2: 64    / HCO3: 24    / Base Excess: -0.1  /  SaO2: 94                CARDIAC MARKERS ( 07 Dec 2020 10:05 )  x     / <0.01 ng/mL / x     / x     / 1.3 ng/mL        Urine/Micro:    Culture - Blood (collected 07 Dec 2020 11:28)  Source: .Blood Blood-Peripheral  Preliminary Report (08 Dec 2020 23:01):    No growth to date.      Urinalysis Basic - ( 07 Dec 2020 13:02 )    Color: Yellow / Appearance: Clear / S.022 / pH: x  Gluc: x / Ketone: Negative  / Bili: Negative / Urobili: <2 mg/dL   Blood: x / Protein: Trace / Nitrite: Negative   Leuk Esterase: Small / RBC: 71 /HPF / WBC 6 /HPF   Sq Epi: x / Non Sq Epi: 2 /HPF / Bacteria: Few        Imaging:     < from: Xray Chest 1 View- PORTABLE-Routine (20 @ 06:32) >  Impression:  Diffuse bilateral pulmonary opacities increased from prior.  < end of copied text >      Assessment:  75y Female patient admitted S/P L carotid artery stenting POD#5  Patient seen and examined at bedside. NAD.     Plan:  - Care as per SICU team  - Follow ID recommendations  - Will reassess for possible stent narrowing  - Monitor labs and replete as necessary      Date/Time: 20 @ 08:28

## 2020-12-09 NOTE — PROGRESS NOTE ADULT - ASSESSMENT
Assessment:  75y Female patient admitted S/P L carotid artery stenting POD#5  Patient seen and examined at bedside. NAD.     Plan:  - Care as per SICU team  - Follow ID recommendations  - Will reassess for possible stent narrowing  - Monitor labs and replete as necessary      Date/Time: 12-09-20 @ 08:28

## 2020-12-09 NOTE — PROGRESS NOTE ADULT - ATTENDING COMMENTS
Patient seen today with neurocritical care NP Bridger.  I was physically present for the key portions of the evaluation and management (E/M) service provided.  I agree with the above history, physical, and plan with the following additions/modifications     Language now fluent and back to baseline.  suspect altered cerebral flow dynamics after carotid stenting, and with poor [place] collateral flow.   No further neurologic workup indicatged, will sign off, she will follow up with her established outpatient neurologist.      Jacob Ramirez MD  Attending Neurointensivist

## 2020-12-09 NOTE — PROGRESS NOTE ADULT - ASSESSMENT
ASSESSMENT  75y F with COPD on 5L home O2, paroxysmal afib, carotid stenosis, HFpEF, HTN, HLD, Bipolar disorder admitted with Chest pain s/p left TCAR, right groin access now found to be COVID-19 PNA    IMPRESSION  #Severe COVID-19 PNA with sepsis    requiring supplemental O2 (on home O2)  < from: Xray Chest 1 View- PORTABLE-Routine (12.08.20 @ 06:32) >Diffuse bilateral pulmonary opacities increased from prior.  #No evidence of Cytokine Release Syndrome   D-Dimer Assay, Quantitative: 207 ng/mL DDU (12-09-20 @ 05:37)  D-Dimer Assay, Quantitative: 251 ng/mL DDU (12-09-20 @ 02:00)  #Obesity BMI (kg/m2): 34    Creatinine, Serum: 0.9 (12-07-20 @ 23:50)      RECOMMENDATIONS  - Remdesivir D1: 200mg x1, Day 2 - Day 5 100mg IV daily. Monitor Cr/LFTs  - s/p convalescent Plasma: 2 units   - Dexamethasone 6mg daily x at least 10 days or until discharge (whichever is longer)  - A/C per primary team  - Prone positioning if possible   - f/u ferritin, CRP    If any questions, please call or send a message on [employer] Teams  Spectra [phone]

## 2020-12-09 NOTE — PROGRESS NOTE ADULT - ATTENDING COMMENTS
I examined the patient with the PA/resident and discussed my plan with the Resident/PA.  I personally provided over 30 minutes of direct critical care to this patient. I agree with the above resident/pa note unless directly contradicted below.     SHAUN COATES 75F s/p left TCAR, right groin access 2/2 >80% stenosis of left ICA, recent TIA, stroke code negative w/u.       Now covid +, ID consulted, CV plasma given, remdesivir given x 4 days   6mg dexamethasone   3LNC 96%   aaox3 , no aphasia   Pain controlled with Tylenol   Stroke w/u negative - MRI, CT, EEG , perfusion scan  continue ASA, Plavix, Pradaxa, Statin home meds,   Afib- Pradaxa , restart home lopressor, home Lasix    chest pain overnight with deep breath - ekg no st changes, troponins negative x 3  Home medication-  Seroquel, Lamictal , low dose ativan PRN  COPD- 5LNC at home, continue home inhalers pulm following   Bebeto-Synephrine gtt for sbp > 110 , now off this am   reg diet, Pepcid home med   fever 101.1--> cx's pending- NGTD     ,  procalcitonin->.09   ,  vanc/cefepime dc'd due to COVID + and normal WBC   DVT sono - negative      DISPO: DOWNGRADE TO COVID FLOOR

## 2020-12-10 LAB
ANION GAP SERPL CALC-SCNC: 11 MMOL/L — SIGNIFICANT CHANGE UP (ref 7–14)
BUN SERPL-MCNC: 23 MG/DL — HIGH (ref 10–20)
CALCIUM SERPL-MCNC: 8.2 MG/DL — LOW (ref 8.5–10.1)
CHLORIDE SERPL-SCNC: 106 MMOL/L — SIGNIFICANT CHANGE UP (ref 98–110)
CO2 SERPL-SCNC: 21 MMOL/L — SIGNIFICANT CHANGE UP (ref 17–32)
CREAT SERPL-MCNC: 0.7 MG/DL — SIGNIFICANT CHANGE UP (ref 0.7–1.5)
CRP SERPL-MCNC: 6.71 MG/DL — HIGH (ref 0–0.4)
GLUCOSE BLDC GLUCOMTR-MCNC: 146 MG/DL — HIGH (ref 70–99)
GLUCOSE SERPL-MCNC: 172 MG/DL — HIGH (ref 70–99)
HCT VFR BLD CALC: 32.5 % — LOW (ref 37–47)
HGB BLD-MCNC: 10.5 G/DL — LOW (ref 12–16)
MAGNESIUM SERPL-MCNC: 1.9 MG/DL — SIGNIFICANT CHANGE UP (ref 1.8–2.4)
MCHC RBC-ENTMCNC: 31 PG — SIGNIFICANT CHANGE UP (ref 27–31)
MCHC RBC-ENTMCNC: 32.3 G/DL — SIGNIFICANT CHANGE UP (ref 32–37)
MCV RBC AUTO: 95.9 FL — SIGNIFICANT CHANGE UP (ref 81–99)
NRBC # BLD: 0 /100 WBCS — SIGNIFICANT CHANGE UP (ref 0–0)
PHOSPHATE SERPL-MCNC: 1.9 MG/DL — LOW (ref 2.1–4.9)
PLATELET # BLD AUTO: 208 K/UL — SIGNIFICANT CHANGE UP (ref 130–400)
POTASSIUM SERPL-MCNC: 3.6 MMOL/L — SIGNIFICANT CHANGE UP (ref 3.5–5)
POTASSIUM SERPL-SCNC: 3.6 MMOL/L — SIGNIFICANT CHANGE UP (ref 3.5–5)
RBC # BLD: 3.39 M/UL — LOW (ref 4.2–5.4)
RBC # FLD: 13.1 % — SIGNIFICANT CHANGE UP (ref 11.5–14.5)
SODIUM SERPL-SCNC: 138 MMOL/L — SIGNIFICANT CHANGE UP (ref 135–146)
WBC # BLD: 5.95 K/UL — SIGNIFICANT CHANGE UP (ref 4.8–10.8)
WBC # FLD AUTO: 5.95 K/UL — SIGNIFICANT CHANGE UP (ref 4.8–10.8)

## 2020-12-10 PROCEDURE — 71045 X-RAY EXAM CHEST 1 VIEW: CPT | Mod: 26

## 2020-12-10 RX ADMIN — DABIGATRAN ETEXILATE MESYLATE 150 MILLIGRAM(S): 150 CAPSULE ORAL at 17:49

## 2020-12-10 RX ADMIN — Medication 85 MILLIMOLE(S): at 05:08

## 2020-12-10 RX ADMIN — CHLORHEXIDINE GLUCONATE 1 APPLICATION(S): 213 SOLUTION TOPICAL at 05:08

## 2020-12-10 RX ADMIN — REMDESIVIR 500 MILLIGRAM(S): 5 INJECTION INTRAVENOUS at 17:50

## 2020-12-10 RX ADMIN — Medication 1 TABLET(S): at 11:24

## 2020-12-10 RX ADMIN — GABAPENTIN 300 MILLIGRAM(S): 400 CAPSULE ORAL at 05:11

## 2020-12-10 RX ADMIN — Medication 81 MILLIGRAM(S): at 11:27

## 2020-12-10 RX ADMIN — Medication 12.5 MILLIGRAM(S): at 17:48

## 2020-12-10 RX ADMIN — Medication 40 MILLIGRAM(S): at 05:10

## 2020-12-10 RX ADMIN — LAMOTRIGINE 100 MILLIGRAM(S): 25 TABLET, ORALLY DISINTEGRATING ORAL at 11:24

## 2020-12-10 RX ADMIN — Medication 12.5 MILLIGRAM(S): at 03:51

## 2020-12-10 RX ADMIN — DABIGATRAN ETEXILATE MESYLATE 150 MILLIGRAM(S): 150 CAPSULE ORAL at 05:10

## 2020-12-10 RX ADMIN — GABAPENTIN 300 MILLIGRAM(S): 400 CAPSULE ORAL at 13:26

## 2020-12-10 RX ADMIN — Medication 12.5 MILLIGRAM(S): at 10:25

## 2020-12-10 RX ADMIN — Medication 6 MILLIGRAM(S): at 05:12

## 2020-12-10 RX ADMIN — BUDESONIDE AND FORMOTEROL FUMARATE DIHYDRATE 2 PUFF(S): 160; 4.5 AEROSOL RESPIRATORY (INHALATION) at 08:18

## 2020-12-10 RX ADMIN — FAMOTIDINE 20 MILLIGRAM(S): 10 INJECTION INTRAVENOUS at 11:25

## 2020-12-10 RX ADMIN — CLOPIDOGREL BISULFATE 75 MILLIGRAM(S): 75 TABLET, FILM COATED ORAL at 11:25

## 2020-12-10 RX ADMIN — Medication 0.5 MILLIGRAM(S): at 10:24

## 2020-12-10 NOTE — PROGRESS NOTE ADULT - ASSESSMENT
Trend blood pressure   Pain management  Incentive spirometry   Encourage ambulation   Follow up ID   Continue pradaxa asa plavix  Covid management    Carb consistent diet

## 2020-12-10 NOTE — PROGRESS NOTE ADULT - SUBJECTIVE AND OBJECTIVE BOX
SHAUN COATES  75y Female   [id number]    Hospital Day:   Post Operative Day:  Procedure:s/p left TCAR   Patient is a 75y old  Female who presents with a chief complaint of Odynophagia associated with chest pain (09 Dec 2020 17:47)    PAST MEDICAL & SURGICAL HISTORY:  Falls    Congestive heart failure    Transient ischemic attack    FLAVIO on CPAP    Bipolar 1 disorder    Allergic reaction    PVD (peripheral vascular disease)    Other emphysema    Other cardiomyopathy    TIA (transient ischemic attack)    COPD (chronic obstructive pulmonary disease)    Depression    Hypertension    History of cholecystectomy        Events of the Last 24h:  Vital Signs Last 24 Hrs  T(C): 35.9 (10 Dec 2020 00:00), Max: 36.4 (09 Dec 2020 06:00)  T(F): 96.7 (10 Dec 2020 00:00), Max: 97.5 (09 Dec 2020 06:00)  HR: 79 (10 Dec 2020 00:00) (68 - 92)  BP: 135/64 (10 Dec 2020 00:00) (103/58 - 155/70)  BP(mean): 89 (09 Dec 2020 18:00) (74 - 114)  RR: 18 (10 Dec 2020 00:00) (16 - 24)  SpO2: 98% (10 Dec 2020 00:00) (86% - 98%)        Diet, Consistent Carbohydrate w/Evening Snack:   Supplement Feeding Modality:  Oral  Glucerna Shake Cans or Servings Per Day:  1       Frequency:  Two Times a day (20 @ 10:13)      I&O's Summary    08 Dec 2020 07:  -  09 Dec 2020 07:00  --------------------------------------------------------  IN: 1562 mL / OUT: 0 mL / NET: -488 mL    09 Dec 2020 07:  -  10 Dec 2020 00:40  --------------------------------------------------------  IN: 370 mL / OUT: 500 mL / NET: -130 mL     I&O's Detail    08 Dec 2020 07:01  -  09 Dec 2020 07:00  --------------------------------------------------------  IN:    IV PiggyBack: 250 mL    Oral Fluid: 750 mL    Plasma: 562 mL  Total IN: 1562 mL    OUT:    Voided (mL): 2050 mL  Total OUT: 2050 mL    Total NET: -488 mL      09 Dec 2020 07:  -  10 Dec 2020 00:40  --------------------------------------------------------  IN:    Oral Fluid: 370 mL  Total IN: 370 mL    OUT:    Voided (mL): 500 mL  Total OUT: 500 mL    Total NET: -130 mL          MEDICATIONS  (STANDING):  ALBUTerol    90 MICROgram(s) HFA Inhaler 2 Puff(s) Inhalation every 6 hours  aspirin  chewable 81 milliGRAM(s) Oral daily  atorvastatin 20 milliGRAM(s) Oral at bedtime  budesonide 160 MICROgram(s)/formoterol 4.5 MICROgram(s) Inhaler 2 Puff(s) Inhalation two times a day  chlorhexidine 4% Liquid 1 Application(s) Topical <User Schedule>  clopidogrel Tablet 75 milliGRAM(s) Oral daily  dabigatran 150 milliGRAM(s) Oral every 12 hours  dexAMETHasone  Injectable 6 milliGRAM(s) IV Push daily  famotidine    Tablet 20 milliGRAM(s) Oral daily  furosemide    Tablet 40 milliGRAM(s) Oral daily  gabapentin 300 milliGRAM(s) Oral three times a day  ipratropium 17 MICROgram(s) HFA Inhaler 1 Puff(s) Inhalation every 6 hours  lamoTRIgine 100 milliGRAM(s) Oral daily  metoprolol tartrate 12.5 milliGRAM(s) Oral every 8 hours  multivitamin 1 Tablet(s) Oral daily  QUEtiapine 200 milliGRAM(s) Oral at bedtime  remdesivir  IVPB 100 milliGRAM(s) IV Intermittent every 24 hours  tiotropium 18 MICROgram(s) Capsule 1 Capsule(s) Inhalation daily    MEDICATIONS  (PRN):  acetaminophen   Tablet .. 650 milliGRAM(s) Oral every 6 hours PRN Temp greater or equal to 38.5C (101.3F), Mild Pain (1 - 3)  bisacodyl 5 milliGRAM(s) Oral every 12 hours PRN Constipation  LORazepam     Tablet 0.5 milliGRAM(s) Oral every 6 hours PRN Anxiety      PHYSICAL EXAM:    GENERAL: NAD    HEENT: NCAT    CHEST/LUNGS: CTAB    HEART: RRR,  No murmurs, rubs, or gallops    ABDOMEN: SNTND +BS    EXTREMITIES:  FROM, No clubbing, cyanosis, or edema, palpable pulse    NEURO: No focal neurological deficits    SKIN: No rashes or lesions    INCISION/WOUNDS:                          9.6    3.71  )-----------( 162      ( 09 Dec 2020 02:00 )             30.2        CBC Full  -  ( 09 Dec 2020 02:00 )  WBC Count : 3.71 K/uL  RBC Count : 3.09 M/uL  Hemoglobin : 9.6 g/dL  Hematocrit : 30.2 %  Platelet Count - Automated : 162 K/uL  Mean Cell Volume : 97.7 fL  Mean Cell Hemoglobin : 31.1 pg  Mean Cell Hemoglobin Concentration : 31.8 g/dL  Auto Neutrophil # : x  Auto Lymphocyte # : x  Auto Monocyte # : x  Auto Eosinophil # : x  Auto Basophil # : x  Auto Neutrophil % : x  Auto Lymphocyte % : x  Auto Monocyte % : x  Auto Eosinophil % : x  Auto Basophil % : x               139   |  108   |  15                 Ca: 8.6    BMP:   ----------------------------< 148    M.2   (20 @ 02:00)             4.5    |  21    | 0.8                Ph: 3.7      LFT:     TPro: 6.0 / Alb: 3.5 / TBili: 0.8 / DBili: x / AST: 31 / ALT: 23 / AlkPhos: 182   (20 @ 06:10)      PT/INR - ( 09 Dec 2020 02:00 )   PT: 14.10 sec;   INR: 1.23 ratio         PTT - ( 09 Dec 2020 02:00 )  PTT:41.9 sec          Culture - Blood (collected 07 Dec 2020 11:28)  Source: .Blood Blood-Peripheral  Preliminary Report (08 Dec 2020 23:01):    No growth to date.

## 2020-12-10 NOTE — CHART NOTE - NSCHARTNOTEFT_GEN_A_CORE
Downgrade / Transfer Note  SHAUN COATES  75y Female  MRN: [id number]  Location: [address]    Primary Dx: CHEST PAIN    Procedures: s/p carotid artery stenting 12/4    Hospital / SICU Course:  76yo female pmhx of COPD on 5L home oxygen, paroxysmal afib, carotid stenosis (L 80%, R 40%), HFpEF (EF 70% Dec 2020), HTN, HLD, bipolar disorder currently s/p left TCAR procedure. Patient presented to the ED with c/o of pain while eating/swallowing and associated chest pain. Was initially admitted to the medicine team. Patient admitted for ACS workup and while inpatient scheduled TCAR expedited. Patient was seen previously by vascular surgery during Oct 2020 admission for TIA workup which was negative for any neurological events. Carotid duplex during that admission showed >80% stenosis of left ICA and scheduled for TCAR Dec 15, 2020.    TCAR expedited and done on 12/4.    As per Hospitalist recs, patient currently on Pradaxa (held 24hrs prior to OR), ASA and Plavix (started since TIA event). Patient underwent said procedure and tolerated it well, extubated in PACU. Patient has goal , started on peripheral barb. Patient to continue Plavix and ASA per vascular fellow. Complaining of pain in left neck but no neurological defecits on exam. SICU consulted for q1 neurovascular checks.    SICU Course complicated by being stroke code with no imaging findings. Patient also re-swabbed and was positive for COVID-19. ID consulted, given 2u of convalescent plasma and started on remdesivir and dexamethasone. Diagnosed with COVID PNA.     PMH/PSH:  Falls    Congestive heart failure    Transient ischemic attack    FLAVIO on CPAP    Bipolar 1 disorder    Allergic reaction    PVD (peripheral vascular disease)    Other emphysema    Other cardiomyopathy    TIA (transient ischemic attack)    COPD (chronic obstructive pulmonary disease)    Depression    Hypertension      History of cholecystectomy      Medications:  acetaminophen   Tablet .. 650 milliGRAM(s) Oral every 6 hours PRN  ALBUTerol    90 MICROgram(s) HFA Inhaler 2 Puff(s) Inhalation every 6 hours  aspirin  chewable 81 milliGRAM(s) Oral daily  atorvastatin 20 milliGRAM(s) Oral at bedtime  bisacodyl 5 milliGRAM(s) Oral every 12 hours PRN  budesonide 160 MICROgram(s)/formoterol 4.5 MICROgram(s) Inhaler 2 Puff(s) Inhalation two times a day  chlorhexidine 4% Liquid 1 Application(s) Topical <User Schedule>  clopidogrel Tablet 75 milliGRAM(s) Oral daily  dabigatran 150 milliGRAM(s) Oral every 12 hours  dexAMETHasone  Injectable 6 milliGRAM(s) IV Push daily  famotidine    Tablet 20 milliGRAM(s) Oral daily  furosemide    Tablet 40 milliGRAM(s) Oral daily  gabapentin 300 milliGRAM(s) Oral three times a day  ipratropium 17 MICROgram(s) HFA Inhaler 1 Puff(s) Inhalation every 6 hours  lamoTRIgine 100 milliGRAM(s) Oral daily  LORazepam     Tablet 0.5 milliGRAM(s) Oral every 6 hours PRN  metoprolol tartrate 12.5 milliGRAM(s) Oral every 8 hours  multivitamin 1 Tablet(s) Oral daily  QUEtiapine 200 milliGRAM(s) Oral at bedtime  remdesivir  IVPB 100 milliGRAM(s) IV Intermittent every 24 hours  tiotropium 18 MICROgram(s) Capsule 1 Capsule(s) Inhalation daily    Allergy:  codeine (Other (Moderate))  Depakote (Unknown)  Dilaudid (Short breath; Rash)  IV Contrast (Anaphylaxis)  losartan (Angioedema)  Risperdal (Other)  verapamil (Short breath; Angioedema)      Plan:  - Vascular surgery will continue to follow patient, but her primary care should be focused around diagnosis of COVID PNA.   - Continue Anticoagulation.    ID plan of:  -Remdesivir D1: 200mg x1, Day 2 - Day 5 100mg IV daily. Monitor Cr/LFTs  - s/p convalescent Plasma: 2 units   - Dexamethasone 6mg daily x at least 10 days or until discharge (whichever is longer)  - A/C per primary team  - Prone positioning if possible   - f/u ferritin, CRP  -----------------------------------------------------------------------------------------------------    Sign out provided to Dr. Tamayo, Surgery Team, medical residents , Dr. Higgins [phone] 01:20 PM    12-10-20 @ 13:05

## 2020-12-11 ENCOUNTER — TRANSCRIPTION ENCOUNTER (OUTPATIENT)
Age: 75
End: 2020-12-11

## 2020-12-11 LAB
ALBUMIN SERPL ELPH-MCNC: 3.3 G/DL — LOW (ref 3.5–5.2)
ALP SERPL-CCNC: 138 U/L — HIGH (ref 30–115)
ALT FLD-CCNC: 16 U/L — SIGNIFICANT CHANGE UP (ref 0–41)
ANION GAP SERPL CALC-SCNC: 11 MMOL/L — SIGNIFICANT CHANGE UP (ref 7–14)
AST SERPL-CCNC: 26 U/L — SIGNIFICANT CHANGE UP (ref 0–41)
BASOPHILS # BLD AUTO: 0.01 K/UL — SIGNIFICANT CHANGE UP (ref 0–0.2)
BASOPHILS NFR BLD AUTO: 0.1 % — SIGNIFICANT CHANGE UP (ref 0–1)
BILIRUB SERPL-MCNC: 0.3 MG/DL — SIGNIFICANT CHANGE UP (ref 0.2–1.2)
BUN SERPL-MCNC: 23 MG/DL — HIGH (ref 10–20)
CALCIUM SERPL-MCNC: 8.3 MG/DL — LOW (ref 8.5–10.1)
CHLORIDE SERPL-SCNC: 108 MMOL/L — SIGNIFICANT CHANGE UP (ref 98–110)
CO2 SERPL-SCNC: 24 MMOL/L — SIGNIFICANT CHANGE UP (ref 17–32)
CREAT SERPL-MCNC: 0.8 MG/DL — SIGNIFICANT CHANGE UP (ref 0.7–1.5)
D DIMER BLD IA.RAPID-MCNC: 290 NG/ML DDU — HIGH (ref 0–230)
EOSINOPHIL # BLD AUTO: 0.32 K/UL — SIGNIFICANT CHANGE UP (ref 0–0.7)
EOSINOPHIL NFR BLD AUTO: 4 % — SIGNIFICANT CHANGE UP (ref 0–8)
GLUCOSE BLDC GLUCOMTR-MCNC: 178 MG/DL — HIGH (ref 70–99)
GLUCOSE SERPL-MCNC: 87 MG/DL — SIGNIFICANT CHANGE UP (ref 70–99)
HCT VFR BLD CALC: 38.1 % — SIGNIFICANT CHANGE UP (ref 37–47)
HGB BLD-MCNC: 11.9 G/DL — LOW (ref 12–16)
IMM GRANULOCYTES NFR BLD AUTO: 0.4 % — HIGH (ref 0.1–0.3)
LDH SERPL L TO P-CCNC: 322 — HIGH (ref 50–242)
LYMPHOCYTES # BLD AUTO: 1.4 K/UL — SIGNIFICANT CHANGE UP (ref 1.2–3.4)
LYMPHOCYTES # BLD AUTO: 17.6 % — LOW (ref 20.5–51.1)
MAGNESIUM SERPL-MCNC: 1.9 MG/DL — SIGNIFICANT CHANGE UP (ref 1.8–2.4)
MCHC RBC-ENTMCNC: 31.2 G/DL — LOW (ref 32–37)
MCHC RBC-ENTMCNC: 31.2 PG — HIGH (ref 27–31)
MCV RBC AUTO: 99.7 FL — HIGH (ref 81–99)
MONOCYTES # BLD AUTO: 0.99 K/UL — HIGH (ref 0.1–0.6)
MONOCYTES NFR BLD AUTO: 12.5 % — HIGH (ref 1.7–9.3)
NEUTROPHILS # BLD AUTO: 5.2 K/UL — SIGNIFICANT CHANGE UP (ref 1.4–6.5)
NEUTROPHILS NFR BLD AUTO: 65.4 % — SIGNIFICANT CHANGE UP (ref 42.2–75.2)
NRBC # BLD: 0 /100 WBCS — SIGNIFICANT CHANGE UP (ref 0–0)
PLATELET # BLD AUTO: 183 K/UL — SIGNIFICANT CHANGE UP (ref 130–400)
POTASSIUM SERPL-MCNC: 3.9 MMOL/L — SIGNIFICANT CHANGE UP (ref 3.5–5)
POTASSIUM SERPL-SCNC: 3.9 MMOL/L — SIGNIFICANT CHANGE UP (ref 3.5–5)
PROT SERPL-MCNC: 5.6 G/DL — LOW (ref 6–8)
RBC # BLD: 3.82 M/UL — LOW (ref 4.2–5.4)
RBC # FLD: 13.3 % — SIGNIFICANT CHANGE UP (ref 11.5–14.5)
SODIUM SERPL-SCNC: 143 MMOL/L — SIGNIFICANT CHANGE UP (ref 135–146)
WBC # BLD: 7.95 K/UL — SIGNIFICANT CHANGE UP (ref 4.8–10.8)
WBC # FLD AUTO: 7.95 K/UL — SIGNIFICANT CHANGE UP (ref 4.8–10.8)

## 2020-12-11 PROCEDURE — 99232 SBSQ HOSP IP/OBS MODERATE 35: CPT | Mod: CS

## 2020-12-11 PROCEDURE — 99233 SBSQ HOSP IP/OBS HIGH 50: CPT | Mod: CS

## 2020-12-11 RX ORDER — SODIUM,POTASSIUM PHOSPHATES 278-250MG
1 POWDER IN PACKET (EA) ORAL DAILY
Refills: 0 | Status: COMPLETED | OUTPATIENT
Start: 2020-12-11 | End: 2020-12-12

## 2020-12-11 RX ADMIN — Medication 81 MILLIGRAM(S): at 11:16

## 2020-12-11 RX ADMIN — ALBUTEROL 2 PUFF(S): 90 AEROSOL, METERED ORAL at 08:09

## 2020-12-11 RX ADMIN — Medication 12.5 MILLIGRAM(S): at 17:06

## 2020-12-11 RX ADMIN — BUDESONIDE AND FORMOTEROL FUMARATE DIHYDRATE 2 PUFF(S): 160; 4.5 AEROSOL RESPIRATORY (INHALATION) at 21:17

## 2020-12-11 RX ADMIN — CLOPIDOGREL BISULFATE 75 MILLIGRAM(S): 75 TABLET, FILM COATED ORAL at 11:16

## 2020-12-11 RX ADMIN — Medication 12.5 MILLIGRAM(S): at 11:18

## 2020-12-11 RX ADMIN — ATORVASTATIN CALCIUM 20 MILLIGRAM(S): 80 TABLET, FILM COATED ORAL at 21:17

## 2020-12-11 RX ADMIN — Medication 1 PUFF(S): at 13:42

## 2020-12-11 RX ADMIN — FAMOTIDINE 20 MILLIGRAM(S): 10 INJECTION INTRAVENOUS at 11:17

## 2020-12-11 RX ADMIN — LAMOTRIGINE 100 MILLIGRAM(S): 25 TABLET, ORALLY DISINTEGRATING ORAL at 11:16

## 2020-12-11 RX ADMIN — Medication 1 TABLET(S): at 11:16

## 2020-12-11 RX ADMIN — GABAPENTIN 300 MILLIGRAM(S): 400 CAPSULE ORAL at 13:41

## 2020-12-11 RX ADMIN — GABAPENTIN 300 MILLIGRAM(S): 400 CAPSULE ORAL at 06:25

## 2020-12-11 RX ADMIN — REMDESIVIR 500 MILLIGRAM(S): 5 INJECTION INTRAVENOUS at 17:05

## 2020-12-11 RX ADMIN — Medication 12.5 MILLIGRAM(S): at 04:10

## 2020-12-11 RX ADMIN — Medication 6 MILLIGRAM(S): at 06:25

## 2020-12-11 RX ADMIN — ALBUTEROL 2 PUFF(S): 90 AEROSOL, METERED ORAL at 13:42

## 2020-12-11 RX ADMIN — Medication 0.5 MILLIGRAM(S): at 11:13

## 2020-12-11 RX ADMIN — DABIGATRAN ETEXILATE MESYLATE 150 MILLIGRAM(S): 150 CAPSULE ORAL at 06:25

## 2020-12-11 RX ADMIN — DABIGATRAN ETEXILATE MESYLATE 150 MILLIGRAM(S): 150 CAPSULE ORAL at 17:06

## 2020-12-11 RX ADMIN — GABAPENTIN 300 MILLIGRAM(S): 400 CAPSULE ORAL at 21:17

## 2020-12-11 RX ADMIN — Medication 1 PUFF(S): at 08:08

## 2020-12-11 RX ADMIN — Medication 1 PACKET(S): at 18:03

## 2020-12-11 RX ADMIN — Medication 0.5 MILLIGRAM(S): at 21:29

## 2020-12-11 RX ADMIN — Medication 40 MILLIGRAM(S): at 06:25

## 2020-12-11 RX ADMIN — BUDESONIDE AND FORMOTEROL FUMARATE DIHYDRATE 2 PUFF(S): 160; 4.5 AEROSOL RESPIRATORY (INHALATION) at 08:07

## 2020-12-11 RX ADMIN — QUETIAPINE FUMARATE 200 MILLIGRAM(S): 200 TABLET, FILM COATED ORAL at 21:17

## 2020-12-11 NOTE — PROGRESS NOTE ADULT - ATTENDING COMMENTS
Patient seen and examined independently. Agree with resident note.  # covid PNA-- patient  is on dexa and remdesivir day 4  # hx of COPD on home oxygen and requirement has not changed. Patient seen and examined independently. Agree with resident note.  # covid PNA-- patient  is on dexa and remdesivir day 4  # hx of COPD on home oxygen and requirement has not changed.  # lt ICA stenosis--s/p carotid artery stenting.  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  # Chr diastolic CHF-- stress test was negative.  DC plan Am after completes remdesivir.

## 2020-12-11 NOTE — PROGRESS NOTE ADULT - ASSESSMENT
76yo female pmhx of COPD on 5L home oxygen, paroxysmal afib, carotid stenosis (L 80%, R 40%), HFpEF (EF 70% Dec 2020), HTN, HLD, bipolar disorder currently s/p left TCAR procedure. Patient presented to the ED with c/o of pain while eating/swallowing and associated chest pain.                   # COVID 19 Pneumonia  - COVID-19 PCR: Detected (12-07-20 @ 12:30)  - Oxygen requirement: 3lpm via NC saturating 99% - will try to wean off  - Inflammatory Markers:  CRP: 6.71 (12-09-20) <-- 8.14 (12-09-20)  Procalcitonin: 0.09 (12-07-20)  Ferritin: 254 (12-09-20)  LDH:  D-Dimer: 207 (12-09-20) <-- 251 (12-09-20)  CK: 24 (12-07-20) <-- 36 (12-06-20) <--24 (12-04-20)  - Chest Imaging: CXR (12/10) - Stable bilateral opacities. Low lung volumes with elevated right hemidiaphragm. No pneumothorax.  - Dexamethasone: Started on 12/08  - Remdesivir: Started on 12/08 - continue until 12/12  - Convalescent Plasma: s/p 2U convalescent plasma on 12/08  - Tocilizumab: not indicated  - Isolation Precaution     #Neck pain and swelling   - MRI neck (12/2/2020): 3.0 cm lobulated intraparotid mass with microcysts suspicious for pleomorphic adenoma, [place]'s tumor, intraparotid schwannoma, or oncocytoma.   - Dental eval appreciated (F/U with outpatient dentist for comprehensive dental care)  - ENT consult appreciated- pt requires parotid mass biopsy  - Neuro-IR - outpatient biopsy after resolution of COVID    # Chest pain  - most likely pleuritic (Dr. Hoffmann Cardiologist)  - Associated with odynophagia, unlikely ACS. EMS/Ed team said they saw TWI, but repeat EKG baseline.   - Trops - x2 and BNP negative. Not volume overloaded, no CHF exacerbation.   - CXR:  LLL opacity.   - CXR on 11/30: Worsening left pleural effusion/opacity.  - ECHO (12/2/2020): Hyperdynamic global left ventricular systolic function; EF of 70 %; Moderately increased LV wall thickness. Grade I diastolic dysfunction.  - stress test 12/1/2020: No ischemic EKG changes  - CT chest( 12/2/2020): left upper lobe anterior bulla/focus of paraseptal emphysema now measuring 6.6 x 6.0 cm. No significant left lower lobe consolidation or left pleural effusion. peripheral predominant opacities    # Paroxysmal atrial fibrillation  - On Pradaxa 150mg PO twice daily and ASA 81 mg   - Plavix recently added after a recent TIA last month.   - Rate controlled currently    # Left ICA stenosis with TIA  - pt has carotid disease (following vascular Dr Huerta- was started on plavix and patient is already on asa and pradaxa. Was planned for  transcarotid artery revascularization on 12/15)  - TCAR was expedited while inpatient and was done on 12/04  - Patient was seen previously by vascular surgery during Oct 2020 admission for TIA workup which was negative for any neurological events. Carotid duplex during that admission showed >80% stenosis of left ICA and scheduled for TCAR Dec 15, 2020.  - Continue plavix, pradaxa, ASA  - Code stroke on 12/05 for aphasia - workup was negative for acute stroke;   - Vascular surgery following - will reassess for stent narrowing      # COPD  - No increase in basal need of O2, no increased cough, fever, chills, or dyspnea so exacerbation is unlikely.   - Continue home inhalers.  - triple inhaler therapy  - CXR( on admission):  LLL opacity.   - CXR on 11/30: Worsening left pleural effusion/opacity.  - CXR (12/3): Improving left lung airspace opacities.  - Pulmonology consult appreciated  - CT chest non contrast (12/2/2020):  left upper lobe anterior bulla/focus of paraseptal emphysema now measuring 6.6 x 6.0 cm. No significant left lower lobe consolidation or left pleural effusion. peripheral predominant opacities.  - f/u Pulm    # chronic HFpEF  # HTN  # HLD  - Preserved EF according to Dr. Bishop's notes, although no echo report in [place].   - ECHO (12/2/2020): Hyperdynamic global left ventricular systolic function; EF of 70 %; Moderately increased LV wall thickness. Grade I diastolic dysfunction.  - Continue PO diuretics and home BP and cholesterol meds    # Bipolar Disorder  - Mood stable  - Continue home meds Lamictal 100mg PO daily, Seroquel 200mg at bedtime, and Ativan 0.5mg PO twice daily PRN    GI ppx: Pepcid     Diet: DASH/consistent carb  Activity: IAT   Dispo: Acute  Code: FULL    74yo female pmhx of COPD on 5L home oxygen, paroxysmal afib, carotid stenosis (L 80%, R 40%), HFpEF (EF 70% Dec 2020), HTN, HLD, bipolar disorder currently s/p left TCAR procedure. Patient presented to the ED with c/o of pain while eating/swallowing and associated chest pain.     # COVID 19 Pneumonia  - COVID-19 PCR: Detected (12-07-20 @ 12:30)  - Oxygen requirement: 3lpm via NC saturating 99% - will try to wean off  - Inflammatory Markers:  CRP: 6.71 (12-09-20) <-- 8.14 (12-09-20)  Procalcitonin: 0.09 (12-07-20)  Ferritin: 254 (12-09-20)  LDH:  D-Dimer: 207 (12-09-20) <-- 251 (12-09-20)  CK: 24 (12-07-20) <-- 36 (12-06-20) <--24 (12-04-20)  - Chest Imaging: CXR (12/10) - Stable bilateral opacities. Low lung volumes with elevated right hemidiaphragm. No pneumothorax.  - Dexamethasone: Started on 12/08  - Remdesivir: Started on 12/08 - continue until 12/12  - Convalescent Plasma: s/p 2U convalescent plasma on 12/08  - Tocilizumab: not indicated  - Isolation Precaution     #Neck pain and swelling   - MRI neck (12/2/2020): 3.0 cm lobulated intraparotid mass with microcysts suspicious for pleomorphic adenoma, [place]'s tumor, intraparotid schwannoma, or oncocytoma.   - Dental eval appreciated (F/U with outpatient dentist for comprehensive dental care)  - ENT consult appreciated- pt requires parotid mass biopsy  - Neuro-IR - outpatient biopsy after resolution of COVID    # Chest pain  - most likely pleuritic (Dr. Hoffmann Cardiologist)  - Associated with odynophagia, unlikely ACS. EMS/Ed team said they saw TWI, but repeat EKG baseline.   - Trops - x2 and BNP negative. Not volume overloaded, no CHF exacerbation.   - CXR:  LLL opacity.   - CXR on 11/30: Worsening left pleural effusion/opacity.  - ECHO (12/2/2020): Hyperdynamic global left ventricular systolic function; EF of 70 %; Moderately increased LV wall thickness. Grade I diastolic dysfunction.  - stress test 12/1/2020: No ischemic EKG changes  - CT chest( 12/2/2020): left upper lobe anterior bulla/focus of paraseptal emphysema now measuring 6.6 x 6.0 cm. No significant left lower lobe consolidation or left pleural effusion. peripheral predominant opacities    # Paroxysmal atrial fibrillation  - On Pradaxa 150mg PO twice daily and ASA 81 mg   - Plavix recently added after a recent TIA last month.   - Rate controlled currently    # Left ICA stenosis with TIA  - pt has carotid disease (following vascular Dr Huerta- was started on plavix and patient is already on asa and pradaxa. Was planned for  transcarotid artery revascularization on 12/15)  - TCAR was expedited while inpatient and was done on 12/04  - Patient was seen previously by vascular surgery during Oct 2020 admission for TIA workup which was negative for any neurological events. Carotid duplex during that admission showed >80% stenosis of left ICA and scheduled for TCAR Dec 15, 2020.  - Continue plavix, pradaxa, ASA  - Code stroke on 12/05 for aphasia - workup was negative for acute stroke;   - Vascular surgery following - will reassess for stent narrowing      # COPD  - No increase in basal need of O2, no increased cough, fever, chills, or dyspnea so exacerbation is unlikely.   - Continue home inhalers.  - triple inhaler therapy  - CXR( on admission):  LLL opacity.   - CXR on 11/30: Worsening left pleural effusion/opacity.  - CXR (12/3): Improving left lung airspace opacities.  - Pulmonology consult appreciated  - CT chest non contrast (12/2/2020):  left upper lobe anterior bulla/focus of paraseptal emphysema now measuring 6.6 x 6.0 cm. No significant left lower lobe consolidation or left pleural effusion. peripheral predominant opacities.  - f/u Pulm    # chronic HFpEF  # HTN  # HLD  - Preserved EF according to Dr. Bishop's notes, although no echo report in [place].   - ECHO (12/2/2020): Hyperdynamic global left ventricular systolic function; EF of 70 %; Moderately increased LV wall thickness. Grade I diastolic dysfunction.  - Continue PO diuretics and home BP and cholesterol meds    # Bipolar Disorder  - Mood stable  - Continue home meds Lamictal 100mg PO daily, Seroquel 200mg at bedtime, and Ativan 0.5mg PO twice daily PRN    GI ppx: Pepcid     Diet: DASH/consistent carb  Activity: IAT   Dispo: Acute  Code: FULL    74yo female pmhx of COPD on 5L home oxygen, paroxysmal afib, carotid stenosis (L 80%, R 40%), HFpEF (EF 70% Dec 2020), HTN, HLD, bipolar disorder currently s/p left TCAR procedure. Patient presented to the ED with c/o of pain while eating/swallowing and associated chest pain.     # COVID 19 Pneumonia  - COVID-19 PCR: Detected (12-07-20 @ 12:30)  - Oxygen requirement: 3lpm via NC saturating 99% - will try to wean off  - Inflammatory Markers:  CRP: 6.71 (12-09-20) <-- 8.14 (12-09-20)  Procalcitonin: 0.09 (12-07-20)  Ferritin: 254 (12-09-20)  D-Dimer: 207 (12-09-20) <-- 251 (12-09-20)  CK: 24 (12-07-20) <-- 36 (12-06-20) <--24 (12-04-20)  - Chest Imaging: CXR (12/10) - Stable bilateral opacities. Low lung volumes with elevated right hemidiaphragm. No pneumothorax.  - Dexamethasone: Started on 12/08  - Remdesivir: Started on 12/08 - continue until 12/12  - Convalescent Plasma: s/p 2U convalescent plasma on 12/08  - Tocilizumab: not indicated  - Isolation Precaution     # Left ICA stenosis with TIA  - pt has carotid disease (following vascular Dr Huerta- was started on plavix and patient is already on asa and pradaxa. Was planned for  transcarotid artery revascularization on 12/15)  - TCAR was expedited while inpatient and was done on 12/04  - Patient was seen previously by vascular surgery during Oct 2020 admission for TIA workup which was negative for any neurological events. Carotid duplex during that admission showed >80% stenosis of left ICA and scheduled for TCAR Dec 15, 2020.  - Continue plavix, pradaxa, ASA  - Code stroke on 12/05 for aphasia - workup was negative for acute stroke;   - Vascular surgery following - will reassess for stent narrowing    # Neck pain and swelling   - MRI neck (12/2/2020): 3.0 cm lobulated intraparotid mass with microcysts suspicious for pleomorphic adenoma, [place]'s tumor, intraparotid schwannoma, or oncocytoma.   - Dental eval appreciated (F/U with outpatient dentist for comprehensive dental care)  - ENT consult appreciated- pt requires parotid mass biopsy  - Neuro-IR - outpatient biopsy after resolution of COVID    # Chest pain  - most likely pleuritic (Dr. Hoffmann Cardiologist)  - Trops - x2 and BNP negative. Not volume overloaded, no CHF exacerbation.   - CXR:  LLL opacity.   - CXR on 11/30: Worsening left pleural effusion/opacity.  - CXR - on 12/10: Stable bilateral opacities. Low lung volumes with elevated right hemidiaphragm. No pneumothorax.  - ECHO (12/2/2020): Hyperdynamic global left ventricular systolic function; EF of 70 %; Moderately increased LV wall thickness. Grade I diastolic dysfunction.  - Sctress test 12/1/2020: No ischemic EKG changes  - CT chest( 12/2/2020): left upper lobe anterior bulla/focus of paraseptal emphysema now measuring 6.6 x 6.0 cm. No significant left lower lobe consolidation or left pleural effusion. peripheral predominant opacities    # Paroxysmal atrial fibrillation  - On Pradaxa 150mg PO twice daily and ASA 81 mg   - Plavix recently added after a recent TIA last month.   - Rate controlled currently    # COPD  - No increase in basal need of O2, no increased cough, fever, chills, or dyspnea so exacerbation is unlikely.   - Continue home inhalers.  - triple inhaler therapy  - CXR( on admission):  LLL opacity.   - CXR on 11/30: Worsening left pleural effusion/opacity.  - CXR - on 12/10: Stable bilateral opacities. Low lung volumes with elevated right hemidiaphragm. No pneumothorax..  - Pulmonology consult appreciated  - CT chest non contrast (12/2/2020):  left upper lobe anterior bulla/focus of paraseptal emphysema now measuring 6.6 x 6.0 cm. No significant left lower lobe consolidation or left pleural effusion. peripheral predominant opacities.  - f/u Pulm    # chronic HFpEF  # HTN  # HLD  - Preserved EF according to Dr. Bishop's notes, although no echo report in [place].   - ECHO (12/2/2020): Hyperdynamic global left ventricular systolic function; EF of 70 %; Moderately increased LV wall thickness. Grade I diastolic dysfunction.  - Continue PO diuretics and home BP and cholesterol meds    # Bipolar Disorder  - Mood stable  - Continue home meds Lamictal 100mg PO daily, Seroquel 200mg at bedtime, and Ativan 0.5mg PO twice daily PRN    GI ppx: Pepcid     Diet: DASH/consistent carb  Activity: IAT   Dispo: Acute  Code: FULL    76yo female pmhx of COPD on 5L home oxygen, paroxysmal afib, carotid stenosis (L 80%, R 40%), HFpEF (EF 70% Dec 2020), HTN, HLD, bipolar disorder currently s/p left TCAR procedure. Patient presented to the ED with c/o of pain while eating/swallowing and associated chest pain.     # COVID 19 Pneumonia  - COVID-19 PCR: Detected (12-07-20 @ 12:30)  - Oxygen requirement: 3lpm via NC saturating 95% (has COPD and uses O2 at home)  - Inflammatory Markers:  CRP: 6.71 (12-09-20) <-- 8.14 (12-09-20)  Procalcitonin: 0.09 (12-07-20)  Ferritin: 254 (12-09-20)  D-Dimer: 207 (12-09-20) <-- 251 (12-09-20)  CK: 24 (12-07-20) <-- 36 (12-06-20) <--24 (12-04-20)  - Chest Imaging: CXR (12/10) - Stable bilateral opacities. Low lung volumes with elevated right hemidiaphragm. No pneumothorax.  - Dexamethasone: Started on 12/08  - Remdesivir: Started on 12/08 - continue until 12/12  - Convalescent Plasma: s/p 2U convalescent plasma on 12/08  - Tocilizumab: not indicated  - Isolation Precaution     # Left ICA stenosis with TIA  - pt has carotid disease (following vascular Dr Huerta- was started on plavix and patient is already on asa and pradaxa. Was planned for  transcarotid artery revascularization on 12/15)  - TCAR was expedited while inpatient and was done on 12/04  - Patient was seen previously by vascular surgery during Oct 2020 admission for TIA workup which was negative for any neurological events. Carotid duplex during that admission showed >80% stenosis of left ICA and scheduled for TCAR Dec 15, 2020.  - Continue plavix, pradaxa, ASA  - Code stroke on 12/05 for aphasia - workup was negative for acute stroke;   - Vascular surgery following - will reassess for stent narrowing    # Neck pain and swelling   - MRI neck (12/2/2020): 3.0 cm lobulated intraparotid mass with microcysts suspicious for pleomorphic adenoma, [place]'s tumor, intraparotid schwannoma, or oncocytoma.   - Dental eval appreciated (F/U with outpatient dentist for comprehensive dental care)  - ENT consult appreciated- pt requires parotid mass biopsy  - Neuro-IR - outpatient biopsy after resolution of COVID    # Chest pain  - most likely pleuritic (Dr. Hoffmann Cardiologist)  - Trops - x2 and BNP negative. Not volume overloaded, no CHF exacerbation.   - CXR:  LLL opacity.   - CXR on 11/30: Worsening left pleural effusion/opacity.  - CXR - on 12/10: Stable bilateral opacities. Low lung volumes with elevated right hemidiaphragm. No pneumothorax.  - ECHO (12/2/2020): Hyperdynamic global left ventricular systolic function; EF of 70 %; Moderately increased LV wall thickness. Grade I diastolic dysfunction.  - Sctress test 12/1/2020: No ischemic EKG changes  - CT chest( 12/2/2020): left upper lobe anterior bulla/focus of paraseptal emphysema now measuring 6.6 x 6.0 cm. No significant left lower lobe consolidation or left pleural effusion. peripheral predominant opacities    # Paroxysmal atrial fibrillation  - On Pradaxa 150mg PO twice daily and ASA 81 mg   - Plavix recently added after a recent TIA last month.   - Rate controlled currently    # COPD  - No increase in basal need of O2, no increased cough, fever, chills, or dyspnea so exacerbation is unlikely.   - Continue home inhalers.  - triple inhaler therapy  - CXR( on admission):  LLL opacity.   - CXR on 11/30: Worsening left pleural effusion/opacity.  - CXR - on 12/10: Stable bilateral opacities. Low lung volumes with elevated right hemidiaphragm. No pneumothorax..  - Pulmonology consult appreciated  - CT chest non contrast (12/2/2020):  left upper lobe anterior bulla/focus of paraseptal emphysema now measuring 6.6 x 6.0 cm. No significant left lower lobe consolidation or left pleural effusion. peripheral predominant opacities.  - f/u Pulm    # chronic HFpEF  # HTN  # HLD  - Preserved EF according to Dr. Bishop's notes, although no echo report in [place].   - ECHO (12/2/2020): Hyperdynamic global left ventricular systolic function; EF of 70 %; Moderately increased LV wall thickness. Grade I diastolic dysfunction.  - Continue PO diuretics and home BP and cholesterol meds    # Bipolar Disorder  - Mood stable  - Continue home meds Lamictal 100mg PO daily, Seroquel 200mg at bedtime, and Ativan 0.5mg PO twice daily PRN    # GI ppx: Pepcid     # Diet: DASH/consistent carb  # Activity: IAT   # Dispo: Acute  # Code: FULL    74yo female pmhx of COPD on 5L home oxygen, paroxysmal afib, carotid stenosis (L 80%, R 40%), HFpEF (EF 70% Dec 2020), HTN, HLD, bipolar disorder currently s/p left TCAR procedure. Patient presented to the ED with c/o of pain while eating/swallowing and associated chest pain.     # COVID 19 Pneumonia  - COVID-19 PCR: Detected (12-07-20 @ 12:30)  - Oxygen requirement: 3lpm via NC saturating 95% (has COPD and uses O2 at home)  - Inflammatory Markers:  CRP: 6.71 (12-09-20) <-- 8.14 (12-09-20)  Procalcitonin: 0.09 (12-07-20)  Ferritin: 254 (12-09-20)  D-Dimer: 290 (12/11/20) <-- 207 (12-09-20) <-- 251 (12-09-20)  CK: 24 (12-07-20) <-- 36 (12-06-20) <--24 (12-04-20)  - Chest Imaging: CXR (12/10) - Stable bilateral opacities. Low lung volumes with elevated right hemidiaphragm. No pneumothorax.  - Dexamethasone: Started on 12/08  - Remdesivir: Started on 12/08 - continue until 12/12  - Convalescent Plasma: s/p 2U convalescent plasma on 12/08  - Tocilizumab: not indicated  - Isolation Precaution     # Left ICA stenosis with TIA  - pt has carotid disease (following vascular Dr Huerta- was started on plavix and patient is already on asa and pradaxa. Was planned for  transcarotid artery revascularization on 12/15)  - TCAR was expedited while inpatient and was done on 12/04  - Patient was seen previously by vascular surgery during Oct 2020 admission for TIA workup which was negative for any neurological events. Carotid duplex during that admission showed >80% stenosis of left ICA and scheduled for TCAR Dec 15, 2020.  - Continue plavix, pradaxa, ASA  - Code stroke on 12/05 for aphasia - workup was negative for acute stroke;   - Vascular surgery following - will reassess for stent narrowing    # Neck pain and swelling   - MRI neck (12/2/2020): 3.0 cm lobulated intraparotid mass with microcysts suspicious for pleomorphic adenoma, [place]'s tumor, intraparotid schwannoma, or oncocytoma.   - Dental eval appreciated (F/U with outpatient dentist for comprehensive dental care)  - ENT consult appreciated- pt requires parotid mass biopsy  - Neuro-IR - outpatient biopsy after resolution of COVID    # Chest pain  - most likely pleuritic (Dr. Hoffmann Cardiologist)  - Trops - x2 and BNP negative. Not volume overloaded, no CHF exacerbation.   - CXR:  LLL opacity.   - CXR on 11/30: Worsening left pleural effusion/opacity.  - CXR - on 12/10: Stable bilateral opacities. Low lung volumes with elevated right hemidiaphragm. No pneumothorax.  - ECHO (12/2/2020): Hyperdynamic global left ventricular systolic function; EF of 70 %; Moderately increased LV wall thickness. Grade I diastolic dysfunction.  - Sctress test 12/1/2020: No ischemic EKG changes  - CT chest( 12/2/2020): left upper lobe anterior bulla/focus of paraseptal emphysema now measuring 6.6 x 6.0 cm. No significant left lower lobe consolidation or left pleural effusion. peripheral predominant opacities    # Paroxysmal atrial fibrillation  - On Pradaxa 150mg PO twice daily and ASA 81 mg   - Plavix recently added after a recent TIA last month.   - Rate controlled currently    # COPD  - No increase in basal need of O2, no increased cough, fever, chills, or dyspnea so exacerbation is unlikely.   - Continue home inhalers.  - triple inhaler therapy  - CXR( on admission):  LLL opacity.   - CXR on 11/30: Worsening left pleural effusion/opacity.  - CXR - on 12/10: Stable bilateral opacities. Low lung volumes with elevated right hemidiaphragm. No pneumothorax..  - Pulmonology consult appreciated  - CT chest non contrast (12/2/2020):  left upper lobe anterior bulla/focus of paraseptal emphysema now measuring 6.6 x 6.0 cm. No significant left lower lobe consolidation or left pleural effusion. peripheral predominant opacities.  - f/u Pulm    # chronic HFpEF  # HTN  # HLD  - Preserved EF according to Dr. Bishop's notes, although no echo report in [place].   - ECHO (12/2/2020): Hyperdynamic global left ventricular systolic function; EF of 70 %; Moderately increased LV wall thickness. Grade I diastolic dysfunction.  - Continue PO diuretics and home BP and cholesterol meds    # Bipolar Disorder  - Mood stable  - Continue home meds Lamictal 100mg PO daily, Seroquel 200mg at bedtime, and Ativan 0.5mg PO twice daily PRN    # GI ppx: Pepcid     # Diet: DASH/consistent carb  # Activity: IAT   # Dispo: Acute  # Code: FULL    74yo female pmhx of COPD on 5L home oxygen, paroxysmal afib, carotid stenosis (L 80%, R 40%), HFpEF (EF 70% Dec 2020), HTN, HLD, bipolar disorder currently s/p left TCAR procedure. Patient presented to the ED with c/o of pain while eating/swallowing and associated chest pain.     # COVID 19 Pneumonia  - COVID-19 PCR: Detected (12-07-20 @ 12:30)  - Oxygen requirement: 3lpm via NC saturating 95% (has COPD and uses O2 at home)  - Inflammatory Markers:  CRP: 6.71 (12-09-20) <-- 8.14 (12-09-20)  Procalcitonin: 0.09 (12-07-20)  Ferritin: 254 (12-09-20)  D-Dimer: 290 (12/11/20) <-- 207 (12-09-20) <-- 251 (12-09-20)  CK: 24 (12-07-20) <-- 36 (12-06-20) <--24 (12-04-20)  - Chest Imaging: CXR (12/10) - Stable bilateral opacities. Low lung volumes with elevated right hemidiaphragm. No pneumothorax.  - Dexamethasone: Started on 12/08  - Remdesivir: Started on 12/08 - continue until 12/12  - Convalescent Plasma: s/p 2U convalescent plasma on 12/08  - Tocilizumab: not indicated  - Isolation Precaution     # Left ICA stenosis with TIA  - pt has carotid disease (following vascular Dr Huerta- was started on plavix and patient is already on asa and pradaxa. Was planned for  transcarotid artery revascularization on 12/15)  - TCAR was expedited while inpatient and was done on 12/04  - Patient was seen previously by vascular surgery during Oct 2020 admission for TIA workup which was negative for any neurological events. Carotid duplex during that admission showed >80% stenosis of left ICA and scheduled for TCAR Dec 15, 2020.  - Continue plavix, pradaxa, ASA  - Code stroke on 12/05 for aphasia - workup was negative for acute stroke;   - Vascular surgery following - will reassess for stent narrowing    # Neck pain and swelling   - MRI neck (12/2/2020): 3.0 cm lobulated intraparotid mass with microcysts suspicious for pleomorphic adenoma, [place]'s tumor, intraparotid schwannoma, or oncocytoma.   - Dental eval appreciated (F/U with outpatient dentist for comprehensive dental care)  - ENT consult appreciated- pt requires parotid mass biopsy  - Neuro-IR - outpatient biopsy after resolution of COVID    # Chest pain  - most likely pleuritic (Dr. Hoffmann Cardiologist)  - Trops - x2 and BNP negative. Not volume overloaded, no CHF exacerbation.   - CXR:  LLL opacity.   - CXR on 11/30: Worsening left pleural effusion/opacity.  - CXR - on 12/10: Stable bilateral opacities. Low lung volumes with elevated right hemidiaphragm. No pneumothorax.  - ECHO (12/2/2020): Hyperdynamic global left ventricular systolic function; EF of 70 %; Moderately increased LV wall thickness. Grade I diastolic dysfunction.  - Sctress test 12/1/2020: No ischemic EKG changes  - CT chest( 12/2/2020): left upper lobe anterior bulla/focus of paraseptal emphysema now measuring 6.6 x 6.0 cm. No significant left lower lobe consolidation or left pleural effusion. peripheral predominant opacities    # Paroxysmal atrial fibrillation  - On Pradaxa 150mg PO twice daily and ASA 81 mg   - Plavix recently added after a recent TIA last month.   - Rate controlled currently    # COPD  - No increase in basal need of O2, no increased cough, fever, chills, or dyspnea so exacerbation is unlikely.   - Continue home inhalers.  - triple inhaler therapy  - CXR( on admission):  LLL opacity.   - CXR on 11/30: Worsening left pleural effusion/opacity.  - CXR - on 12/10: Stable bilateral opacities. Low lung volumes with elevated right hemidiaphragm. No pneumothorax..  - Pulmonology consult appreciated  - CT chest non contrast (12/2/2020):  left upper lobe anterior bulla/focus of paraseptal emphysema now measuring 6.6 x 6.0 cm. No significant left lower lobe consolidation or left pleural effusion. peripheral predominant opacities.  - f/u Pulm    # Chronic HFpEF  # HTN  # HLD  - Preserved EF according to Dr. Bishop's notes, although no echo report in [facility].   - ECHO (12/2/2020): Hyperdynamic global left ventricular systolic function; EF of 70 %; Moderately increased LV wall thickness. Grade I diastolic dysfunction.  - Continue PO diuretics and home BP and cholesterol meds    # Bipolar Disorder  - Mood stable  - Continue home meds Lamictal 100mg PO daily, Seroquel 200mg at bedtime, and Ativan 0.5mg PO twice daily PRN    # GI ppx: Pepcid     # Diet: DASH/consistent carb  # Activity: IAT   # Dispo: Acute  # Code: FULL    76yo female pmhx of COPD on 5L home oxygen, paroxysmal afib, carotid stenosis (L 80%, R 40%), HFpEF (EF 70% Dec 2020), HTN, HLD, bipolar disorder currently s/p left TCAR procedure. Patient presented to the ED with c/o of pain while eating/swallowing and associated chest pain.     # COVID 19 Pneumonia  - COVID-19 PCR: Detected (12-07-20 @ 12:30)  - Oxygen requirement: 3lpm via [place] saturating 95% (has COPD and uses O2 at home)  - Inflammatory Markers:  CRP: 6.71 (12-09-20) <-- 8.14 (12-09-20)  Procalcitonin: 0.09 (12-07-20)  Ferritin: 254 (12-09-20)  D-Dimer: 290 (12/11/20) <-- 207 (12-09-20) <-- 251 (12-09-20)  CK: 24 (12-07-20) <-- 36 (12-06-20) <--24 (12-04-20)  - Chest Imaging: CXR (12/10) - Stable bilateral opacities. Low lung volumes with elevated right hemidiaphragm. No pneumothorax.  - Dexamethasone: Started on 12/08  - Remdesivir: Started on 12/08 - continue until 12/12  - Convalescent Plasma: s/p 2U convalescent plasma on 12/08  - Tocilizumab: not indicated  - Isolation Precaution     # Left ICA stenosis with TIA  - pt has carotid disease (following vascular Dr Huerta- was started on plavix and patient is already on asa and pradaxa. Was planned for  transcarotid artery revascularization on 12/15)  - TCAR was expedited while inpatient and was done on 12/04  - Patient was seen previously by vascular surgery during Oct 2020 admission for TIA workup which was negative for any neurological events. Carotid duplex during that admission showed >80% stenosis of left ICA and scheduled for TCAR Dec 15, 2020.  - Continue plavix, pradaxa, ASA  - Code stroke on 12/05 for aphasia - workup was negative for acute stroke;   - Vascular surgery following - called vascular surgery #[id number] : no further inpatient intervention needed, stent was assessed and has good flow; will follow up outpatient.     # Neck pain and swelling   - MRI neck (12/2/2020): 3.0 cm lobulated intraparotid mass with microcysts suspicious for pleomorphic adenoma, Paige's tumor, intraparotid schwannoma, or oncocytoma.   - Dental eval appreciated (F/U with outpatient dentist for comprehensive dental care)  - ENT consult appreciated- pt requires parotid mass biopsy  - Neuro-IR - outpatient biopsy after resolution of COVID    # Chest pain  - most likely pleuritic (Dr. Hoffmann Cardiologist)  - Trops - x2 and BNP negative. Not volume overloaded, no CHF exacerbation.   - CXR:  LLL opacity.   - CXR on 11/30: Worsening left pleural effusion/opacity.  - CXR - on 12/10: Stable bilateral opacities. Low lung volumes with elevated right hemidiaphragm. No pneumothorax.  - ECHO (12/2/2020): Hyperdynamic global left ventricular systolic function; EF of 70 %; Moderately increased LV wall thickness. Grade I diastolic dysfunction.  - Sctress test 12/1/2020: No ischemic EKG changes  - CT chest( 12/2/2020): left upper lobe anterior bulla/focus of paraseptal emphysema now measuring 6.6 x 6.0 cm. No significant left lower lobe consolidation or left pleural effusion. peripheral predominant opacities    # Paroxysmal atrial fibrillation  - On Pradaxa 150mg PO twice daily and ASA 81 mg   - Plavix recently added after a recent TIA last month.   - Rate controlled currently    # COPD  - No increase in basal need of O2, no increased cough, fever, chills, or dyspnea so exacerbation is unlikely.   - Continue home inhalers.  - triple inhaler therapy  - CXR( on admission):  LLL opacity.   - CXR on 11/30: Worsening left pleural effusion/opacity.  - CXR - on 12/10: Stable bilateral opacities. Low lung volumes with elevated right hemidiaphragm. No pneumothorax..  - Pulmonology consult appreciated  - CT chest non contrast (12/2/2020):  left upper lobe anterior bulla/focus of paraseptal emphysema now measuring 6.6 x 6.0 cm. No significant left lower lobe consolidation or left pleural effusion. peripheral predominant opacities.  - f/u Pulm    # Chronic HFpEF  # HTN  # HLD  - Preserved EF according to Dr. Bishop's notes, although no echo report in [place].   - ECHO (12/2/2020): Hyperdynamic global left ventricular systolic function; EF of 70 %; Moderately increased LV wall thickness. Grade I diastolic dysfunction.  - Continue PO diuretics and home BP and cholesterol meds    # Bipolar Disorder  - Mood stable  - Continue home meds Lamictal 100mg PO daily, Seroquel 200mg at bedtime, and Ativan 0.5mg PO twice daily PRN    # GI ppx: Pepcid     # Diet: DASH/consistent carb  # Activity: IAT   # Dispo: Acute  # Code: FULL

## 2020-12-11 NOTE — DISCHARGE NOTE PROVIDER - CARE PROVIDERS DIRECT ADDRESSES
,[email or web address],[email or web address],[email or web address] ,[email or web address],DirectAddress_Unknown,DirectAddress_Unknown,DirectAddress_Unknown

## 2020-12-11 NOTE — DISCHARGE NOTE PROVIDER - PROVIDER TOKENS
PROVIDER:[TOKEN:[[id number]],FOLLOWUP:[2 weeks],ESTABLISHEDPATIENT:[T]],PROVIDER:[TOKEN:[[id number]],FOLLOWUP:[2 weeks]],PROVIDER:[TOKEN:[[id number]],FOLLOWUP:[2 weeks]] PROVIDER:[TOKEN:[[id number]],FOLLOWUP:[2 weeks],ESTABLISHEDPATIENT:[T]],PROVIDER:[TOKEN:[[id number]],FOLLOWUP:[2 weeks]],PROVIDER:[TOKEN:[[id number]],FOLLOWUP:[2 weeks]],PROVIDER:[TOKEN:[[id number]],FOLLOWUP:[2 weeks]],PROVIDER:[TOKEN:[[id number]],FOLLOWUP:[2 weeks]] PROVIDER:[TOKEN:[[id number]],FOLLOWUP:[2 weeks],ESTABLISHEDPATIENT:[T]],PROVIDER:[TOKEN:[[id number]],FOLLOWUP:[2 weeks]],PROVIDER:[TOKEN:[[id number]],FOLLOWUP:[2 weeks]],PROVIDER:[TOKEN:[[id number]],FOLLOWUP:[2 weeks]],PROVIDER:[TOKEN:[[id number]],FOLLOWUP:[2 weeks]],PROVIDER:[TOKEN:[[id number]],FOLLOWUP:[2 weeks]]

## 2020-12-11 NOTE — DISCHARGE NOTE PROVIDER - HOSPITAL COURSE
76yo female pmhx of COPD on 5L home oxygen, paroxysmal afib, carotid stenosis (L 80%, R 40%), HFpEF (EF 70% Dec 2020), HTN, HLD, bipolar disorder currently s/p left TCAR procedure. Patient presented to the ED with c/o of pain while eating/swallowing and associated chest pain.   In the ED, her vitals were normal. EMS reported that in transit there was some anterior leads that showed TWI, but repeat EKG in the ED did not show any changes. CXR did however show a LLL opacity, increased from last month's CXR representing a possible developing pneumonia. She also wanted to come to the ED because she is planning to undergo a carotid endarterectomy on 12/15 and was concerned that if she was ill, she would need to postpone the procedure.  Trops were negative x2 and BNP negative. Not volume overloaded, no CHF exacerbation. Stress test done on 12/1/2020 showed no ischemic changes. CT chest( 12/2/2020) showed left upper lobe anterior bulla/focus of paraseptal emphysema now measuring 6.6 x 6.0 cm.   She had a left sided neck swelling and pain. MRI neck (12/2/2020) showed 3.0 cm lobulated intraparotid mass with microcysts suspicious for pleomorphic adenoma, Paige's tumor, intraparotid schwannoma, or oncocytoma. She was evaluated by ENT and Dentistry. She will follow up outpatient for further evaluation of the intraparotid mass.   Patient has carotid disease and follows with Dr. Huerta. She was planned for TCAR which was performed while she was inpatient on 12/04. She was started on aspirin, plavix and pradaxa. During the hospital stay she had a code stroke on 12/05 d/t aphasia : workup was negative for acute stroke.  Vascular surgery evaluated for stent stenosis; per vascular no further inpatient intervention is needed, stent was assessed and has good flow; will follow up outpatient.   During the hospital stay she was tested positive for COVID 19 on 12/07. She received Dexamethasone 6mg IV OD (started 12/08), Remdesivir for 5 days (12/08 - 12/12) and convalescent plasma 2U on 12/08. Inflammatory Markers: CRP: 6.71 (12-09-20) <-- 8.14 (12-09-20); Procalcitonin: 0.09 (12-07-20); Ferritin: 254 (12-09-20); D-Dimer: 290 (12/11/20) <-- 207 (12-09-20) <-- 251 (12-09-20); CK: 24 (12-07-20) <-- 36 (12-06-20) <--24 (12-04-20)  She is currently saturating 95% on O2 at 4lpm, which is her baseline as she has COPD and was on O2 at home. 74yo female pmhx of COPD on 5L home oxygen, paroxysmal afib, carotid stenosis (L 80%, R 40%), HFpEF (EF 70% Dec 2020), HTN, HLD, bipolar disorder currently s/p left TCAR procedure. Patient presented to the ED with c/o of pain while eating/swallowing and associated chest pain.   In the ED, her vitals were normal. EMS reported that in transit there was some anterior leads that showed TWI, but repeat EKG in the ED did not show any changes. CXR did however show a LLL opacity, increased from last month's CXR representing a possible developing pneumonia. She also wanted to come to the ED because she is planning to undergo a carotid endarterectomy on 12/15 and was concerned that if she was ill, she would need to postpone the procedure.  Trops were negative x2 and BNP negative. Not volume overloaded, no CHF exacerbation. Stress test done on 12/1/2020 showed no ischemic changes. CT chest( 12/2/2020) showed left upper lobe anterior bulla/focus of paraseptal emphysema now measuring 6.6 x 6.0 cm.   She had a left sided neck swelling and pain. MRI neck (12/2/2020) showed 3.0 cm lobulated intraparotid mass with microcysts suspicious for pleomorphic adenoma, Paige's tumor, intraparotid schwannoma, or oncocytoma. She was evaluated by ENT and Dentistry. She will follow up outpatient for further evaluation of the intraparotid mass.   Patient has carotid disease and follows with Dr. Huerta. She was planned for TCAR which was performed while she was inpatient on 12/04. She was started on aspirin, plavix and pradaxa. During the hospital stay she had a code stroke on 12/05 d/t aphasia : workup was negative for acute stroke.  Vascular surgery evaluated for possible stent stenosis; per vascular no further inpatient intervention is needed, stent was assessed and has good flow; will follow up outpatient.   During the hospital stay she was tested positive for COVID 19 on 12/07. She received Dexamethasone 6mg IV OD (started 12/08), Remdesivir for 5 days (12/08 - 12/12) and convalescent plasma 2U on 12/08. Inflammatory Markers: CRP: 6.71 (12-09-20) <-- 8.14 (12-09-20); Procalcitonin: 0.09 (12-07-20); Ferritin: 254 (12-09-20); D-Dimer: 290 (12/11/20) <-- 207 (12-09-20) <-- 251 (12-09-20); CK: 24 (12-07-20) <-- 36 (12-06-20) <--24 (12-04-20)  She is currently saturating 95% on O2 at 4lpm, which is her baseline as she has COPD and was on O2 at home.

## 2020-12-11 NOTE — DISCHARGE NOTE PROVIDER - NSDCCPCAREPLAN_GEN_ALL_CORE_FT
PRINCIPAL DISCHARGE DIAGNOSIS  Diagnosis: Chest pain  Assessment and Plan of Treatment:       SECONDARY DISCHARGE DIAGNOSES  Diagnosis: History of TIAs  Assessment and Plan of Treatment:     Diagnosis: Bipolar disorder  Assessment and Plan of Treatment:     Diagnosis: Hyperlipidemia  Assessment and Plan of Treatment:     Diagnosis: HTN (hypertension)  Assessment and Plan of Treatment:     Diagnosis: Chronic CHF  Assessment and Plan of Treatment:     Diagnosis: Atrial fibrillation  Assessment and Plan of Treatment:     Diagnosis: COPD without exacerbation  Assessment and Plan of Treatment:     Diagnosis: H/O carotid artery stenosis  Assessment and Plan of Treatment:      PRINCIPAL DISCHARGE DIAGNOSIS  Diagnosis: Chest pain  Assessment and Plan of Treatment: You came to the hospital due to chest pain. You were evaluated in the hospital for the chest pain. Workup in the hospital was negative for coronary artery disease. You had a cardiac stress test done on 12/01/2020, which showed no evidence of coronary artery disease. Chest x-ray showed a left sided pleural effusion, which has since resolved.   Chest Pain  Chest pain can be caused by many different conditions which may or may not be dangerous. Causes include heartburn, lung infections, heart attack, blood clot in lungs, skin infections, strain or damage to muscle, cartilage, or bones, etc. In addition to a history and physical examination, an electrocardiogram (ECG) or other lab tests may have been performed to determine the cause of your chest pain. Follow up with your primary care provider or with a cardiologist as instructed.   SEEK IMMEDIATE MEDICAL CARE IF YOU HAVE ANY OF THE FOLLOWING SYMPTOMS: worsening chest pain, coughing up blood, unexplained back/neck/jaw pain, severe abdominal pain, dizziness or lightheadedness, fainting, shortness of breath, sweaty or clammy skin, vomiting, or racing heart beat. These symptoms may represent a serious problem that is an emergency. Do not wait to see if the symptoms will go away. Get medical help right away. Call 911 and do not drive yourself to the hospital.        SECONDARY DISCHARGE DIAGNOSES  Diagnosis: Mass of left parotid gland  Assessment and Plan of Treatment: In the hospital  you had an MRI to evaluate for the left sided neck swelling and pain. The MRI done on 12/02 showed a 3 cm masss within the left parotid gland. You were evaluated by ENT physician and were suggested for an outpatient follow up for further evaluation and possible biopsy of the mass.   Please follow up with Dr. Gandhi (ENT) for further evaluation and management of the parotid mass.   Take pain medications as needed for the pain.    Diagnosis: H/O carotid artery stenosis  Assessment and Plan of Treatment: You have a history of carotid artery stenosis. You were scheduled for a TCAR prodecure. You had the procedure done in the hospital on 12/04/2020. You had a transient stroke like episode on 12/05 with inability to speak out the words that you want to say. Which was likely related to the manipulation of the carotid artery.   Currently you have no more neurological symptoms. You were evaluated by vascular surgery for possible stent stenosis. You have a good flow in the carotid artery now as per vascular evaluation.   PLEASE FOLLOW UP OUTPATIENT WITH DR. CARDONA TO DISCUSS FURTHER MANAGEMENT.    Diagnosis: History of TIAs  Assessment and Plan of Treatment: FOLLOW UP WITH YOUR NEUROLOGIST FOR OUTPATIENT EVALUATION.  You had an episode of transient ischemic attack while in the hospital with an inability to speak. You had imaging tests performed of your brain, which were negative for any acute stroke. You wer evaluated by neurology. You symptoms have resolved now and there is no residual deficit.   Luckily you did not suffer a stroke at this time, however you may have had what we believe is a "TIA" or a mini-stroke. You do not have any changes on images of your brain and you will not have any residual problems from it, but we worry about TIAs because they may predict a higher likelihood of having a stroke in the future. Be sure to practice healthy lifestyle measures, exercise 150min per week, eat a healthy well balanced diet. If you have been told to take medications called statins or aspirin, be sure to take them as prescribed on a daily basis. Return to th ER immediately if you have symptoms consistent with a stroke again such as slurred speech, one sided weakness, confusion, inability to talk or to understand speech from others.      Diagnosis: Hyperlipidemia  Assessment and Plan of Treatment: Hyperlipidemia is a high level of lipids (fats) in your blood. These lipids include cholesterol or triglycerides. Lipids are made by your body. They also come from the foods you eat. Your body needs lipids to work properly, but high levels increase your risk for heart disease, heart attack, and stroke.  You have any of the following signs of a heart attack:  Squeezing, pressure, or pain in your chest  You may also have any of the following:  Discomfort or pain in your back, neck, jaw, stomach, or arm,  Shortness of breath,  Nausea or vomiting,  Lightheadedness or a sudden cold sweat,  You have any of the following signs of a stroke:  Numbness or drooping on one side of your face,   Weakness in an arm or leg,   Confusion or difficulty speaking,  Dizziness, a severe headache, or vision loss      Diagnosis: HTN (hypertension)  Assessment and Plan of Treatment: Hypertension  Hypertension, commonly called high blood pressure, is when the force of blood pumping through your arteries is too strong. Hypertension forces your heart to work harder to pump blood. Your arteries may become narrow or stiff. Having untreated or uncontrolled hypertension for a long period of time can cause heart attack, stroke, kidney disease, and other problems. If started on a medication, take exactly as prescribed by your health care professional. Maintain a healthy lifestyle and follow up with your primary care physician.  SEEK IMMEDIATE MEDICAL CARE IF YOU HAVE ANY OF THE FOLLOWING SYMPTOMS: severe headache, confusion, chest pain, abdominal pain, vomiting, or shortness of breath.      Diagnosis: Chronic CHF  Assessment and Plan of Treatment: Congestive Heart Failure (CHF)  Congestive heart failure is a chronic condition in which the heart has trouble pumping blood. In some cases of heart failure, fluid may back up into your lungs or you may have swelling (edema) in your lower legs. There are many causes of heart failure including high blood pressure, coronary artery disease, abnormal heart valves, heart muscle disease, lung disease, diabetes, etc. Symptoms include shortness of breath with activity or when lying flat, cough, swelling of the legs, fatigue, or increased urination during the night.   Treatment is aimed at managing the symptoms of heart failure and may include lifestyle changes, medications, or surgical procedures. Take medicines only as directed by your health care provider and do not stop unless instructed to do so. Eat heart-healthy foods with low or no trans/saturated fats, cholesterol and salt. Weigh yourself every day for early recognition of fluid accumulation.  SEEK IMMEDIATE MEDICAL CARE IF YOU HAVE ANY OF THE FOLLOWING SYMPTOMS: shortness of breath, change in mental status, chest pain, lightheadedness/dizziness/fainting, or worsening of symptoms including not being able to conduct normal physical activity.      Diagnosis: Atrial fibrillation  Assessment and Plan of Treatment: Atrial Fibrillation  Atrial fibrillation is a type of irregular heartbeat (arrhythmia) where the heart quivers continuously in a chaotic pattern that makes the heart unable to pump blood normally. This can increase the risk for stroke, heart failure, and other heart-related conditions. Atrial fibrillation can be caused by a variety of conditions and may be temporary, intermittent, or permanent. Symptoms include feeling that your heart is beating rapidly or irregularly, chest discomfort, shortness of breath, or dizziness/lightheadedness that may be worse with exertion. Treatment is varied but may involve medication or electrical shock (cardioversion).  SEEK IMMEDIATE MEDICAL CARE IF YOU HAVE ANY OF THE FOLLOWING SYMPTOMS: chest pain, shortness of breath, abdominal pain, sweating, vomiting, blood in vomit/bowel movements/urine, dizziness/lightheadedness, weakness or numbness to face/arm/leg, trouble speaking or understanding, facial droop.      Diagnosis: COPD without exacerbation  Assessment and Plan of Treatment: Chronic Obstructive Pulmonary Disease  Chronic obstructive pulmonary disease (COPD) is a lung condition in which airflow from the lungs is limited. Causes include smoking, secondhand smoke exposure, genetics, or recurrent infections. Take all medicines (inhaled or pills) as directed by your health care provider. Avoid exposure to irritants such as smoke, chemicals, and fumes that aggravate your breathing.  If you are a smoker, the most important thing that you can do is stop smoking. Continuing to smoke will cause further lung damage and breathing trouble. Ask your health care provider for help with quitting smoking.  SEEK IMMEDIATE MEDICAL CARE IF YOU HAVE ANY OF THE FOLLOWING SYMPTOMS: shortness of breath at rest or when talking, bluish discoloration of lips, skin, fever, worsening cough, unexplained chest pain, or lightheadedness/dizziness.       PRINCIPAL DISCHARGE DIAGNOSIS  Diagnosis: Chest pain  Assessment and Plan of Treatment: You came to the hospital due to chest pain. You were evaluated in the hospital for the chest pain. Workup in the hospital was negative for coronary artery disease. You had a cardiac stress test done on 12/01/2020, which showed no evidence of coronary artery disease. Chest x-ray showed a left sided pleural effusion, which has since resolved.   Chest Pain  Chest pain can be caused by many different conditions which may or may not be dangerous. Causes include heartburn, lung infections, heart attack, blood clot in lungs, skin infections, strain or damage to muscle, cartilage, or bones, etc. In addition to a history and physical examination, an electrocardiogram (ECG) or other lab tests may have been performed to determine the cause of your chest pain. Follow up with your primary care provider or with a cardiologist as instructed.   SEEK IMMEDIATE MEDICAL CARE IF YOU HAVE ANY OF THE FOLLOWING SYMPTOMS: worsening chest pain, coughing up blood, unexplained back/neck/jaw pain, severe abdominal pain, dizziness or lightheadedness, fainting, shortness of breath, sweaty or clammy skin, vomiting, or racing heart beat. These symptoms may represent a serious problem that is an emergency. Do not wait to see if the symptoms will go away. Get medical help right away. Call 911 and do not drive yourself to the hospital.        SECONDARY DISCHARGE DIAGNOSES  Diagnosis: COVID-19 virus infection  Assessment and Plan of Treatment: Coronavirus disease 2019 (COVID-19) is a respiratory illness  that can spread from person to person. The virus that causes  COVID-19 is a novel coronavirus that was first identified during  an investigation into an outbreak in [place], [place].  The virus that causes COVID-19 probably emerged from an  animal source, but is now spreading from person to person.  The virus is thought to spread mainly between people who  are in close contact with one another (within about 6 feet)  through respiratory droplets produced when an infected  person coughs or sneezes. It also may be possible that a person  can get COVID-19 by touching a surface or object that has  the virus on it and then touching their own mouth, nose, or  possibly their eyes, but this is not thought to be the main  way the virus spreads.  Please stay home and avoid contact with others for at least 10 days after symptoms resolve and follow government guidelines.   Patients with COVID-19 have had mild to severe respiratory  illness with symptoms of  • fever  • cough  • shortness of breath  People can help protect themselves from respiratory illness with  everyday preventive actions.    • Avoid close contact with people who are sick.  • Avoid touching your eyes, nose, and mouth with  unwashed hands.  • Wash your hands often with soap and water for at least 20   seconds. Use an alcohol-based hand  that contains at  least 60% alcohol if soap and water are not available.   Stay home when you are sick.  • Cover your cough or sneeze with a tissue, then throw the  tissue in the trash.  • Clean and disinfect frequently touched objects  and surfaces.  Call 911 and inform them you are covid positive before you decide to go to the emergency room if you have chest pain, difficulty breathing, high fevers, worsening of your symptoms, feel unwell, or have nausea and vomiting.      Diagnosis: Mass of left parotid gland  Assessment and Plan of Treatment: In the hospital  you had an MRI to evaluate for the left sided neck swelling and pain. The MRI done on 12/02 showed a 3 cm masss within the left parotid gland. You were evaluated by ENT physician and were suggested for an outpatient follow up for further evaluation and possible biopsy of the mass.   Please follow up with Dr. Gandhi (ENT) for further evaluation and management of the parotid mass.   Take pain medications as needed for the pain.    Diagnosis: H/O carotid artery stenosis  Assessment and Plan of Treatment: You have a history of carotid artery stenosis. You were scheduled for a TCAR prodecure. You had the procedure done in the hospital on 12/04/2020. You had a transient stroke like episode on 12/05 with inability to speak out the words that you want to say. Which was likely related to the manipulation of the carotid artery.   Currently you have no more neurological symptoms. You were evaluated by vascular surgery for possible stent stenosis. You have a good flow in the carotid artery now as per vascular evaluation.   PLEASE FOLLOW UP OUTPATIENT WITH DR. CARDONA TO DISCUSS FURTHER MANAGEMENT.    Diagnosis: History of TIAs  Assessment and Plan of Treatment: FOLLOW UP WITH YOUR NEUROLOGIST FOR OUTPATIENT EVALUATION.  You had an episode of transient ischemic attack while in the hospital with an inability to speak. You had imaging tests performed of your brain, which were negative for any acute stroke. You wer evaluated by neurology. You symptoms have resolved now and there is no residual deficit.   Luckily you did not suffer a stroke at this time, however you may have had what we believe is a "TIA" or a mini-stroke. You do not have any changes on images of your brain and you will not have any residual problems from it, but we worry about TIAs because they may predict a higher likelihood of having a stroke in the future. Be sure to practice healthy lifestyle measures, exercise 150min per week, eat a healthy well balanced diet. If you have been told to take medications called statins or aspirin, be sure to take them as prescribed on a daily basis. Return to th ER immediately if you have symptoms consistent with a stroke again such as slurred speech, one sided weakness, confusion, inability to talk or to understand speech from others.      Diagnosis: Hyperlipidemia  Assessment and Plan of Treatment: Hyperlipidemia is a high level of lipids (fats) in your blood. These lipids include cholesterol or triglycerides. Lipids are made by your body. They also come from the foods you eat. Your body needs lipids to work properly, but high levels increase your risk for heart disease, heart attack, and stroke.  You have any of the following signs of a heart attack:  Squeezing, pressure, or pain in your chest  You may also have any of the following:  Discomfort or pain in your back, neck, jaw, stomach, or arm,  Shortness of breath,  Nausea or vomiting,  Lightheadedness or a sudden cold sweat,  You have any of the following signs of a stroke:  Numbness or drooping on one side of your face,   Weakness in an arm or leg,   Confusion or difficulty speaking,  Dizziness, a severe headache, or vision loss      Diagnosis: HTN (hypertension)  Assessment and Plan of Treatment: Hypertension  Hypertension, commonly called high blood pressure, is when the force of blood pumping through your arteries is too strong. Hypertension forces your heart to work harder to pump blood. Your arteries may become narrow or stiff. Having untreated or uncontrolled hypertension for a long period of time can cause heart attack, stroke, kidney disease, and other problems. If started on a medication, take exactly as prescribed by your health care professional. Maintain a healthy lifestyle and follow up with your primary care physician.  SEEK IMMEDIATE MEDICAL CARE IF YOU HAVE ANY OF THE FOLLOWING SYMPTOMS: severe headache, confusion, chest pain, abdominal pain, vomiting, or shortness of breath.      Diagnosis: Chronic CHF  Assessment and Plan of Treatment: Congestive Heart Failure (CHF)  Congestive heart failure is a chronic condition in which the heart has trouble pumping blood. In some cases of heart failure, fluid may back up into your lungs or you may have swelling (edema) in your lower legs. There are many causes of heart failure including high blood pressure, coronary artery disease, abnormal heart valves, heart muscle disease, lung disease, diabetes, etc. Symptoms include shortness of breath with activity or when lying flat, cough, swelling of the legs, fatigue, or increased urination during the night.   Treatment is aimed at managing the symptoms of heart failure and may include lifestyle changes, medications, or surgical procedures. Take medicines only as directed by your health care provider and do not stop unless instructed to do so. Eat heart-healthy foods with low or no trans/saturated fats, cholesterol and salt. Weigh yourself every day for early recognition of fluid accumulation.  SEEK IMMEDIATE MEDICAL CARE IF YOU HAVE ANY OF THE FOLLOWING SYMPTOMS: shortness of breath, change in mental status, chest pain, lightheadedness/dizziness/fainting, or worsening of symptoms including not being able to conduct normal physical activity.      Diagnosis: Atrial fibrillation  Assessment and Plan of Treatment: Atrial Fibrillation  Atrial fibrillation is a type of irregular heartbeat (arrhythmia) where the heart quivers continuously in a chaotic pattern that makes the heart unable to pump blood normally. This can increase the risk for stroke, heart failure, and other heart-related conditions. Atrial fibrillation can be caused by a variety of conditions and may be temporary, intermittent, or permanent. Symptoms include feeling that your heart is beating rapidly or irregularly, chest discomfort, shortness of breath, or dizziness/lightheadedness that may be worse with exertion. Treatment is varied but may involve medication or electrical shock (cardioversion).  SEEK IMMEDIATE MEDICAL CARE IF YOU HAVE ANY OF THE FOLLOWING SYMPTOMS: chest pain, shortness of breath, abdominal pain, sweating, vomiting, blood in vomit/bowel movements/urine, dizziness/lightheadedness, weakness or numbness to face/arm/leg, trouble speaking or understanding, facial droop.      Diagnosis: COPD without exacerbation  Assessment and Plan of Treatment: Chronic Obstructive Pulmonary Disease  Chronic obstructive pulmonary disease (COPD) is a lung condition in which airflow from the lungs is limited. Causes include smoking, secondhand smoke exposure, genetics, or recurrent infections. Take all medicines (inhaled or pills) as directed by your health care provider. Avoid exposure to irritants such as smoke, chemicals, and fumes that aggravate your breathing.  If you are a smoker, the most important thing that you can do is stop smoking. Continuing to smoke will cause further lung damage and breathing trouble. Ask your health care provider for help with quitting smoking.  SEEK IMMEDIATE MEDICAL CARE IF YOU HAVE ANY OF THE FOLLOWING SYMPTOMS: shortness of breath at rest or when talking, bluish discoloration of lips, skin, fever, worsening cough, unexplained chest pain, or lightheadedness/dizziness.       PRINCIPAL DISCHARGE DIAGNOSIS  Diagnosis: Chest pain  Assessment and Plan of Treatment: You came to the hospital due to chest pain. You were evaluated in the hospital for the chest pain. Workup in the hospital was negative for coronary artery disease. You had a cardiac stress test done on 12/01/2020, which showed no evidence of coronary artery disease. Chest x-ray showed a left sided pleural effusion, which has since resolved.   Chest Pain  Chest pain can be caused by many different conditions which may or may not be dangerous. Causes include heartburn, lung infections, heart attack, blood clot in lungs, skin infections, strain or damage to muscle, cartilage, or bones, etc. In addition to a history and physical examination, an electrocardiogram (ECG) or other lab tests may have been performed to determine the cause of your chest pain. Follow up with your primary care provider or with a cardiologist as instructed.   SEEK IMMEDIATE MEDICAL CARE IF YOU HAVE ANY OF THE FOLLOWING SYMPTOMS: worsening chest pain, coughing up blood, unexplained back/neck/jaw pain, severe abdominal pain, dizziness or lightheadedness, fainting, shortness of breath, sweaty or clammy skin, vomiting, or racing heart beat. These symptoms may represent a serious problem that is an emergency. Do not wait to see if the symptoms will go away. Get medical help right away. Call 911 and do not drive yourself to the hospital.        SECONDARY DISCHARGE DIAGNOSES  Diagnosis: COVID-19 virus infection  Assessment and Plan of Treatment: YOU WERE GIVEN STEROIDS AND ANTIVIRAL IN THE HOSPITAL. PLEASE TAKE PREDNISONE 10MG TABLETS - 4TABS ONCE A DAY FOR 5 DAYS THEN 2 TABS ONCE A DAY FOR 3 DAYS THEN 1 TAB ONCE A DAY FOR 3 DAYS THEN STOP.   Coronavirus disease 2019 (COVID-19) is a respiratory illness  that can spread from person to person. The virus that causes  COVID-19 is a novel coronavirus that was first identified during  an investigation into an outbreak in [facility].  Please stay home and avoid contact with others for at least 10 days after symptoms resolve and follow government guidelines.   Patients with COVID-19 have had mild to severe respiratory  illness with symptoms of  • fever  • cough  • shortness of breath  People can help protect themselves from respiratory illness with  everyday preventive actions.    • Avoid close contact with people who are sick.  • Avoid touching your eyes, nose, and mouth with  unwashed hands.  • Wash your hands often with soap and water for at least 20   seconds. Use an alcohol-based hand  that contains at  least 60% alcohol if soap and water are not available.   Stay home when you are sick.  • Cover your cough or sneeze with a tissue, then throw the  tissue in the trash.  • Clean and disinfect frequently touched objects  and surfaces.  Call 911 and inform them you are covid positive before you decide to go to the emergency room if you have chest pain, difficulty breathing, high fevers, worsening of your symptoms, feel unwell, or have nausea and vomiting.      Diagnosis: Mass of left parotid gland  Assessment and Plan of Treatment: In the hospital  you had an MRI to evaluate for the left sided neck swelling and pain. The MRI done on 12/02 showed a 3 cm masss within the left parotid gland. You were evaluated by ENT physician and were suggested for an outpatient follow up for further evaluation and possible biopsy of the mass.   Please follow up with Dr. Gandhi (ENT) for further evaluation and management of the parotid mass.   Take pain medications as needed for the pain.    Diagnosis: H/O carotid artery stenosis  Assessment and Plan of Treatment: You have a history of carotid artery stenosis. You were scheduled for a TCAR prodecure. You had the procedure done in the hospital on 12/04/2020. You had a transient stroke like episode on 12/05 with inability to speak out the words that you want to say. Which was likely related to the manipulation of the carotid artery.   Currently you have no more neurological symptoms. You were evaluated by vascular surgery for possible stent stenosis. You have a good flow in the carotid artery now as per vascular evaluation.   PLEASE FOLLOW UP OUTPATIENT WITH DR. CARDONA TO DISCUSS FURTHER MANAGEMENT.    Diagnosis: History of TIAs  Assessment and Plan of Treatment: FOLLOW UP WITH YOUR NEUROLOGIST FOR OUTPATIENT EVALUATION.  You had an episode of transient ischemic attack while in the hospital with an inability to speak. You had imaging tests performed of your brain, which were negative for any acute stroke. You wer evaluated by neurology. You symptoms have resolved now and there is no residual deficit.   Luckily you did not suffer a stroke at this time, however you may have had what we believe is a "TIA" or a mini-stroke. You do not have any changes on images of your brain and you will not have any residual problems from it, but we worry about TIAs because they may predict a higher likelihood of having a stroke in the future. Be sure to practice healthy lifestyle measures, exercise 150min per week, eat a healthy well balanced diet. If you have been told to take medications called statins or aspirin, be sure to take them as prescribed on a daily basis. Return to th ER immediately if you have symptoms consistent with a stroke again such as slurred speech, one sided weakness, confusion, inability to talk or to understand speech from others.      Diagnosis: Hyperlipidemia  Assessment and Plan of Treatment: Hyperlipidemia is a high level of lipids (fats) in your blood. These lipids include cholesterol or triglycerides. Lipids are made by your body. They also come from the foods you eat. Your body needs lipids to work properly, but high levels increase your risk for heart disease, heart attack, and stroke.  You have any of the following signs of a heart attack:  Squeezing, pressure, or pain in your chest  You may also have any of the following:  Discomfort or pain in your back, neck, jaw, stomach, or arm,  Shortness of breath,  Nausea or vomiting,  Lightheadedness or a sudden cold sweat,  You have any of the following signs of a stroke:  Numbness or drooping on one side of your face,   Weakness in an arm or leg,   Confusion or difficulty speaking,  Dizziness, a severe headache, or vision loss      Diagnosis: HTN (hypertension)  Assessment and Plan of Treatment: Hypertension  Hypertension, commonly called high blood pressure, is when the force of blood pumping through your arteries is too strong. Hypertension forces your heart to work harder to pump blood. Your arteries may become narrow or stiff. Having untreated or uncontrolled hypertension for a long period of time can cause heart attack, stroke, kidney disease, and other problems. If started on a medication, take exactly as prescribed by your health care professional. Maintain a healthy lifestyle and follow up with your primary care physician.  SEEK IMMEDIATE MEDICAL CARE IF YOU HAVE ANY OF THE FOLLOWING SYMPTOMS: severe headache, confusion, chest pain, abdominal pain, vomiting, or shortness of breath.      Diagnosis: Chronic CHF  Assessment and Plan of Treatment: Congestive Heart Failure (CHF)  Congestive heart failure is a chronic condition in which the heart has trouble pumping blood. In some cases of heart failure, fluid may back up into your lungs or you may have swelling (edema) in your lower legs. There are many causes of heart failure including high blood pressure, coronary artery disease, abnormal heart valves, heart muscle disease, lung disease, diabetes, etc. Symptoms include shortness of breath with activity or when lying flat, cough, swelling of the legs, fatigue, or increased urination during the night.   Treatment is aimed at managing the symptoms of heart failure and may include lifestyle changes, medications, or surgical procedures. Take medicines only as directed by your health care provider and do not stop unless instructed to do so. Eat heart-healthy foods with low or no trans/saturated fats, cholesterol and salt. Weigh yourself every day for early recognition of fluid accumulation.  SEEK IMMEDIATE MEDICAL CARE IF YOU HAVE ANY OF THE FOLLOWING SYMPTOMS: shortness of breath, change in mental status, chest pain, lightheadedness/dizziness/fainting, or worsening of symptoms including not being able to conduct normal physical activity.      Diagnosis: Atrial fibrillation  Assessment and Plan of Treatment: Atrial Fibrillation  Atrial fibrillation is a type of irregular heartbeat (arrhythmia) where the heart quivers continuously in a chaotic pattern that makes the heart unable to pump blood normally. This can increase the risk for stroke, heart failure, and other heart-related conditions. Atrial fibrillation can be caused by a variety of conditions and may be temporary, intermittent, or permanent. Symptoms include feeling that your heart is beating rapidly or irregularly, chest discomfort, shortness of breath, or dizziness/lightheadedness that may be worse with exertion. Treatment is varied but may involve medication or electrical shock (cardioversion).  SEEK IMMEDIATE MEDICAL CARE IF YOU HAVE ANY OF THE FOLLOWING SYMPTOMS: chest pain, shortness of breath, abdominal pain, sweating, vomiting, blood in vomit/bowel movements/urine, dizziness/lightheadedness, weakness or numbness to face/arm/leg, trouble speaking or understanding, facial droop.      Diagnosis: COPD without exacerbation  Assessment and Plan of Treatment: Chronic Obstructive Pulmonary Disease  Chronic obstructive pulmonary disease (COPD) is a lung condition in which airflow from the lungs is limited. Causes include smoking, secondhand smoke exposure, genetics, or recurrent infections. Take all medicines (inhaled or pills) as directed by your health care provider. Avoid exposure to irritants such as smoke, chemicals, and fumes that aggravate your breathing.  If you are a smoker, the most important thing that you can do is stop smoking. Continuing to smoke will cause further lung damage and breathing trouble. Ask your health care provider for help with quitting smoking.  SEEK IMMEDIATE MEDICAL CARE IF YOU HAVE ANY OF THE FOLLOWING SYMPTOMS: shortness of breath at rest or when talking, bluish discoloration of lips, skin, fever, worsening cough, unexplained chest pain, or lightheadedness/dizziness.

## 2020-12-11 NOTE — DISCHARGE NOTE PROVIDER - CARE PROVIDER_API CALL
Milad Aguilar  FAMILY MEDICINE  [address] [place] [address]  Phone: [phone]  Fax: [phone]  Established Patient  Follow Up Time: 2 weeks    Michael Huerta  SURGERY  [address] [facility]  [place] [address]  Phone: [phone]  Fax: [phone]  Follow Up Time: 2 weeks    Jordyn Gandhi  OTOLARYNGOLOGY  [address] [facility] [place] [address]  Phone: [phone]  Fax: [phone]  Follow Up Time: 2 weeks   Milad Aguilar  FAMILY MEDICINE  [address] [place] [address]  Phone: [phone]  Fax: [phone]  Established Patient  Follow Up Time: 2 weeks    Michael Huerta  SURGERY  [address] [facility]  [place] [address]  Phone: [phone]  Fax: [phone]  Follow Up Time: 2 weeks    Jordyn Gandhi  OTOLARYNGOLOGY  [address] [facility] [place] [address]  Phone: [phone]  Fax: [phone]  Follow Up Time: 2 weeks    VIVIENNE HAQUE  Cardiovascular Disease  [address] [place] [address]  Phone: [phone]  Fax: [phone]  Follow Up Time: 2 weeks    Hunter Chavez)  Critical Care Medicine; Internal Medicine; Pulmonary Disease  [address] [facility], [address]  [place] [address]  Phone: [phone]  Fax: [phone]  Follow Up Time: 2 weeks   Milad Aguilar  FAMILY MEDICINE  [address] [place] [address]  Phone: [phone]  Fax: [phone]  Established Patient  Follow Up Time: 2 weeks    Michael Huerta  SURGERY  [address] [facility]  [place] [address]  Phone: [phone]  Fax: [phone]  Follow Up Time: 2 weeks    Jordyn Gadnhi  OTOLARYNGOLOGY  [address] [facility] [place] [address]  Phone: [phone]  Fax: [phone]  Follow Up Time: 2 weeks    VIVIENNE HAQUE  Cardiovascular Disease  [address] [place] [address]  Phone: [phone]  Fax: [phone]  Follow Up Time: 2 weeks    Hunter Chavez)  Critical Care Medicine; Internal Medicine; Pulmonary Disease  [address] [facility], [address]  [place] [address]  Phone: [phone]  Fax: [phone]  Follow Up Time: 2 weeks    Shawna Pérez  [address]  [address] [place] [place] [address]  Phone: [phone]  Fax: [phone]  Follow Up Time: 2 weeks

## 2020-12-11 NOTE — DISCHARGE NOTE PROVIDER - NSDCMRMEDTOKEN_GEN_ALL_CORE_FT
amLODIPine 2.5 mg oral tablet: 1 tab(s) orally once a day  aspirin 81 mg oral tablet: 1 tab(s) orally once a day  atorvastatin 20 mg oral tablet: 1 tab(s) orally once a day  budesonide-formoterol 160 mcg-4.5 mcg/inh inhalation aerosol: 2 puff(s) inhaled 2 times a day  furosemide 40 mg oral tablet: 1 tab(s) orally once a day  gabapentin 300 mg oral capsule: 1 cap(s) orally 3 times a day  lamoTRIgine 100 mg oral tablet, extended release: 1 tab(s) orally once a day  LORazepam 0.5 mg oral tablet: 1 tab(s) orally 2 times a day, As Needed  Metoprolol Tartrate 100 mg oral tablet: 1 tab(s) orally 2 times a day  montelukast 10 mg oral tablet: 1 tab(s) orally once a day  Pepcid 20 mg oral tablet: 1 tab(s) orally once a day  Plavix 75 mg oral tablet: 1 tab(s) orally once a day  Pradaxa 150 mg oral capsule: 1 cap(s) orally 2 times a day   QUEtiapine 200 mg oral tablet: 1 tab(s) orally once a day (at bedtime)  tiotropium 18 mcg inhalation capsule: 1 cap(s) inhaled once a day   albuterol 90 mcg/inh inhalation aerosol: 2 puff(s) inhaled every 6 hours, As Needed -for shortness of breath and/or wheezing   amLODIPine 2.5 mg oral tablet: 1 tab(s) orally once a day  aspirin 81 mg oral tablet: 1 tab(s) orally once a day  atorvastatin 20 mg oral tablet: 1 tab(s) orally once a day  budesonide-formoterol 160 mcg-4.5 mcg/inh inhalation aerosol: 2 puff(s) inhaled 2 times a day  furosemide 40 mg oral tablet: 1 tab(s) orally once a day  gabapentin 300 mg oral capsule: 1 cap(s) orally 3 times a day  lamoTRIgine 100 mg oral tablet, extended release: 1 tab(s) orally once a day  LORazepam 0.5 mg oral tablet: 1 tab(s) orally 2 times a day, As Needed  Metoprolol Tartrate 100 mg oral tablet: 1 tab(s) orally 2 times a day  montelukast 10 mg oral tablet: 1 tab(s) orally once a day  Multiple Vitamins oral tablet: 1 tab(s) orally once a day  Pepcid 20 mg oral tablet: 1 tab(s) orally once a day  Plavix 75 mg oral tablet: 1 tab(s) orally once a day  Pradaxa 150 mg oral capsule: 1 cap(s) orally 2 times a day   predniSONE 20 mg oral tablet: 2 tab(s) orally once a day for 5 days then stop  QUEtiapine 200 mg oral tablet: 1 tab(s) orally once a day (at bedtime)  tiotropium 18 mcg inhalation capsule: 1 cap(s) inhaled once a day   acetaminophen 325 mg oral tablet: 2 tab(s) orally every 6 hours, As Needed for pain   albuterol 90 mcg/inh inhalation aerosol: 2 puff(s) inhaled every 6 hours, As Needed -for shortness of breath and/or wheezing   amLODIPine 2.5 mg oral tablet: 1 tab(s) orally once a day  aspirin 81 mg oral tablet: 1 tab(s) orally once a day  atorvastatin 20 mg oral tablet: 1 tab(s) orally once a day  budesonide-formoterol 160 mcg-4.5 mcg/inh inhalation aerosol: 2 puff(s) inhaled 2 times a day  furosemide 40 mg oral tablet: 1 tab(s) orally once a day  gabapentin 300 mg oral capsule: 1 cap(s) orally 3 times a day  lamoTRIgine 100 mg oral tablet, extended release: 1 tab(s) orally once a day  LORazepam 0.5 mg oral tablet: 1 tab(s) orally 2 times a day, As Needed  Metoprolol Tartrate 100 mg oral tablet: 1 tab(s) orally 2 times a day  montelukast 10 mg oral tablet: 1 tab(s) orally once a day  Multiple Vitamins oral tablet: 1 tab(s) orally once a day  Pepcid 20 mg oral tablet: 1 tab(s) orally once a day  Plavix 75 mg oral tablet: 1 tab(s) orally once a day  Pradaxa 150 mg oral capsule: 1 cap(s) orally 2 times a day   predniSONE 10 mg oral tablet: 4 tab(s) orally once a day for 5 days then   2 tabs once a day for 3 days then   1 tab once a day for 3 days then stop  QUEtiapine 200 mg oral tablet: 1 tab(s) orally once a day (at bedtime)  tiotropium 18 mcg inhalation capsule: 1 cap(s) inhaled once a day

## 2020-12-12 ENCOUNTER — TRANSCRIPTION ENCOUNTER (OUTPATIENT)
Age: 75
End: 2020-12-12

## 2020-12-12 LAB
ALBUMIN SERPL ELPH-MCNC: 3.1 G/DL — LOW (ref 3.5–5.2)
ALP SERPL-CCNC: 130 U/L — HIGH (ref 30–115)
ALT FLD-CCNC: 15 U/L — SIGNIFICANT CHANGE UP (ref 0–41)
ANION GAP SERPL CALC-SCNC: 11 MMOL/L — SIGNIFICANT CHANGE UP (ref 7–14)
AST SERPL-CCNC: 18 U/L — SIGNIFICANT CHANGE UP (ref 0–41)
BASOPHILS # BLD AUTO: 0.01 K/UL — SIGNIFICANT CHANGE UP (ref 0–0.2)
BASOPHILS NFR BLD AUTO: 0.2 % — SIGNIFICANT CHANGE UP (ref 0–1)
BILIRUB SERPL-MCNC: 0.3 MG/DL — SIGNIFICANT CHANGE UP (ref 0.2–1.2)
BUN SERPL-MCNC: 27 MG/DL — HIGH (ref 10–20)
CALCIUM SERPL-MCNC: 7.9 MG/DL — LOW (ref 8.5–10.1)
CHLORIDE SERPL-SCNC: 107 MMOL/L — SIGNIFICANT CHANGE UP (ref 98–110)
CO2 SERPL-SCNC: 22 MMOL/L — SIGNIFICANT CHANGE UP (ref 17–32)
CREAT SERPL-MCNC: 0.8 MG/DL — SIGNIFICANT CHANGE UP (ref 0.7–1.5)
CRP SERPL-MCNC: 2.39 MG/DL — HIGH (ref 0–0.4)
CULTURE RESULTS: SIGNIFICANT CHANGE UP
EOSINOPHIL # BLD AUTO: 0.02 K/UL — SIGNIFICANT CHANGE UP (ref 0–0.7)
EOSINOPHIL NFR BLD AUTO: 0.4 % — SIGNIFICANT CHANGE UP (ref 0–8)
GLUCOSE BLDC GLUCOMTR-MCNC: 100 MG/DL — HIGH (ref 70–99)
GLUCOSE BLDC GLUCOMTR-MCNC: 166 MG/DL — HIGH (ref 70–99)
GLUCOSE SERPL-MCNC: 140 MG/DL — HIGH (ref 70–99)
HCT VFR BLD CALC: 33.5 % — LOW (ref 37–47)
HGB BLD-MCNC: 10.8 G/DL — LOW (ref 12–16)
IMM GRANULOCYTES NFR BLD AUTO: 0.8 % — HIGH (ref 0.1–0.3)
LYMPHOCYTES # BLD AUTO: 1.17 K/UL — LOW (ref 1.2–3.4)
LYMPHOCYTES # BLD AUTO: 22.4 % — SIGNIFICANT CHANGE UP (ref 20.5–51.1)
MAGNESIUM SERPL-MCNC: 1.8 MG/DL — SIGNIFICANT CHANGE UP (ref 1.8–2.4)
MCHC RBC-ENTMCNC: 31.3 PG — HIGH (ref 27–31)
MCHC RBC-ENTMCNC: 32.2 G/DL — SIGNIFICANT CHANGE UP (ref 32–37)
MCV RBC AUTO: 97.1 FL — SIGNIFICANT CHANGE UP (ref 81–99)
MONOCYTES # BLD AUTO: 0.74 K/UL — HIGH (ref 0.1–0.6)
MONOCYTES NFR BLD AUTO: 14.1 % — HIGH (ref 1.7–9.3)
NEUTROPHILS # BLD AUTO: 3.25 K/UL — SIGNIFICANT CHANGE UP (ref 1.4–6.5)
NEUTROPHILS NFR BLD AUTO: 62.1 % — SIGNIFICANT CHANGE UP (ref 42.2–75.2)
NRBC # BLD: 0 /100 WBCS — SIGNIFICANT CHANGE UP (ref 0–0)
PHOSPHATE SERPL-MCNC: 2.4 MG/DL — SIGNIFICANT CHANGE UP (ref 2.1–4.9)
PLATELET # BLD AUTO: 215 K/UL — SIGNIFICANT CHANGE UP (ref 130–400)
POTASSIUM SERPL-MCNC: 3.4 MMOL/L — LOW (ref 3.5–5)
POTASSIUM SERPL-SCNC: 3.4 MMOL/L — LOW (ref 3.5–5)
PROCALCITONIN SERPL-MCNC: 0.1 NG/ML — SIGNIFICANT CHANGE UP (ref 0.02–0.1)
PROT SERPL-MCNC: 5.4 G/DL — LOW (ref 6–8)
RBC # BLD: 3.45 M/UL — LOW (ref 4.2–5.4)
RBC # FLD: 13.3 % — SIGNIFICANT CHANGE UP (ref 11.5–14.5)
SODIUM SERPL-SCNC: 140 MMOL/L — SIGNIFICANT CHANGE UP (ref 135–146)
SPECIMEN SOURCE: SIGNIFICANT CHANGE UP
WBC # BLD: 5.23 K/UL — SIGNIFICANT CHANGE UP (ref 4.8–10.8)
WBC # FLD AUTO: 5.23 K/UL — SIGNIFICANT CHANGE UP (ref 4.8–10.8)

## 2020-12-12 PROCEDURE — 99239 HOSP IP/OBS DSCHRG MGMT >30: CPT | Mod: CS

## 2020-12-12 RX ORDER — POTASSIUM CHLORIDE 20 MEQ
20 PACKET (EA) ORAL
Refills: 0 | Status: COMPLETED | OUTPATIENT
Start: 2020-12-12 | End: 2020-12-12

## 2020-12-12 RX ORDER — ALBUTEROL 90 UG/1
2 AEROSOL, METERED ORAL
Qty: 1 | Refills: 0
Start: 2020-12-12

## 2020-12-12 RX ORDER — ACETAMINOPHEN 500 MG
2 TABLET ORAL
Qty: 40 | Refills: 0
Start: 2020-12-12 | End: 2020-12-16

## 2020-12-12 RX ADMIN — Medication 6 MILLIGRAM(S): at 05:51

## 2020-12-12 RX ADMIN — REMDESIVIR 500 MILLIGRAM(S): 5 INJECTION INTRAVENOUS at 17:51

## 2020-12-12 RX ADMIN — CHLORHEXIDINE GLUCONATE 1 APPLICATION(S): 213 SOLUTION TOPICAL at 05:51

## 2020-12-12 RX ADMIN — GABAPENTIN 300 MILLIGRAM(S): 400 CAPSULE ORAL at 05:51

## 2020-12-12 RX ADMIN — ATORVASTATIN CALCIUM 20 MILLIGRAM(S): 80 TABLET, FILM COATED ORAL at 21:11

## 2020-12-12 RX ADMIN — QUETIAPINE FUMARATE 200 MILLIGRAM(S): 200 TABLET, FILM COATED ORAL at 21:11

## 2020-12-12 RX ADMIN — Medication 12.5 MILLIGRAM(S): at 13:20

## 2020-12-12 RX ADMIN — BUDESONIDE AND FORMOTEROL FUMARATE DIHYDRATE 2 PUFF(S): 160; 4.5 AEROSOL RESPIRATORY (INHALATION) at 08:23

## 2020-12-12 RX ADMIN — GABAPENTIN 300 MILLIGRAM(S): 400 CAPSULE ORAL at 13:19

## 2020-12-12 RX ADMIN — DABIGATRAN ETEXILATE MESYLATE 150 MILLIGRAM(S): 150 CAPSULE ORAL at 05:51

## 2020-12-12 RX ADMIN — Medication 1 TABLET(S): at 11:01

## 2020-12-12 RX ADMIN — Medication 1 PACKET(S): at 11:03

## 2020-12-12 RX ADMIN — Medication 650 MILLIGRAM(S): at 21:41

## 2020-12-12 RX ADMIN — Medication 40 MILLIGRAM(S): at 05:51

## 2020-12-12 RX ADMIN — Medication 81 MILLIGRAM(S): at 11:03

## 2020-12-12 RX ADMIN — Medication 20 MILLIEQUIVALENT(S): at 11:02

## 2020-12-12 RX ADMIN — Medication 20 MILLIEQUIVALENT(S): at 13:19

## 2020-12-12 RX ADMIN — FAMOTIDINE 20 MILLIGRAM(S): 10 INJECTION INTRAVENOUS at 11:03

## 2020-12-12 RX ADMIN — GABAPENTIN 300 MILLIGRAM(S): 400 CAPSULE ORAL at 21:11

## 2020-12-12 RX ADMIN — Medication 650 MILLIGRAM(S): at 21:11

## 2020-12-12 RX ADMIN — Medication 12.5 MILLIGRAM(S): at 21:11

## 2020-12-12 RX ADMIN — Medication 12.5 MILLIGRAM(S): at 05:51

## 2020-12-12 RX ADMIN — Medication 0.5 MILLIGRAM(S): at 11:08

## 2020-12-12 RX ADMIN — DABIGATRAN ETEXILATE MESYLATE 150 MILLIGRAM(S): 150 CAPSULE ORAL at 17:52

## 2020-12-12 RX ADMIN — CLOPIDOGREL BISULFATE 75 MILLIGRAM(S): 75 TABLET, FILM COATED ORAL at 11:03

## 2020-12-12 RX ADMIN — LAMOTRIGINE 100 MILLIGRAM(S): 25 TABLET, ORALLY DISINTEGRATING ORAL at 11:01

## 2020-12-12 NOTE — PROGRESS NOTE ADULT - ASSESSMENT
76yo female pmhx of COPD on 5L home oxygen, paroxysmal afib, carotid stenosis (L 80%, R 40%), HFpEF (EF 70% Dec 2020), HTN, HLD, bipolar disorder currently s/p left TCAR procedure. Patient presented to the ED with c/o of pain while eating/swallowing and associated chest pain.     # COVID 19 Pneumonia  - COVID-19 PCR: Detected (12-07-20 @ 12:30)  - Oxygen requirement: 3lpm via NC saturating 95% (has COPD and uses O2 at home)  - Inflammatory Markers:  CRP: 6.71 (12-09-20) <-- 8.14 (12-09-20)  Procalcitonin: 0.09 (12-07-20)  Ferritin: 254 (12-09-20)  D-Dimer: 290 (12/11/20) <-- 207 (12-09-20) <-- 251 (12-09-20)  CK: 24 (12-07-20) <-- 36 (12-06-20) <--24 (12-04-20)  - Chest Imaging: CXR (12/10) - Stable bilateral opacities. Low lung volumes with elevated right hemidiaphragm. No pneumothorax.  - Dexamethasone: Started on 12/08  - Remdesivir: Started on 12/08 - completes course today  - Convalescent Plasma: s/p 2U convalescent plasma on 12/08  - Tocilizumab: not indicated  - Isolation Precaution     # Left ICA stenosis with TIA  - pt has carotid disease (following vascular Dr Huerta- was started on plavix and patient is already on asa and pradaxa. Was planned for  transcarotid artery revascularization on 12/15)  - TCAR was expedited while inpatient and was done on 12/04  - Patient was seen previously by vascular surgery during Oct 2020 admission for TIA workup which was negative for any neurological events. Carotid duplex during that admission showed >80% stenosis of left ICA and scheduled for TCAR Dec 15, 2020.  - Continue plavix, pradaxa, ASA  - Code stroke on 12/05 for aphasia - workup was negative for acute stroke;   - Vascular surgery following - called vascular surgery #[id number] : no further inpatient intervention needed, stent was assessed and has good flow; will follow up outpatient.     # Neck pain and swelling   - MRI neck (12/2/2020): 3.0 cm lobulated intraparotid mass with microcysts suspicious for pleomorphic adenoma, [place]'s tumor, intraparotid schwannoma, or oncocytoma.   - Dental eval appreciated (F/U with outpatient dentist for comprehensive dental care)  - ENT consult appreciated- pt requires parotid mass biopsy  - Neuro-IR - outpatient biopsy after resolution of COVID    # Chest pain  - most likely pleuritic (Dr. Hoffmann Cardiologist)  - Trops - x2 and BNP negative. Not volume overloaded, no CHF exacerbation.   - CXR:  LLL opacity.   - CXR on 11/30: Worsening left pleural effusion/opacity.  - CXR - on 12/10: Stable bilateral opacities. Low lung volumes with elevated right hemidiaphragm. No pneumothorax.  - ECHO (12/2/2020): Hyperdynamic global left ventricular systolic function; EF of 70 %; Moderately increased LV wall thickness. Grade I diastolic dysfunction.  - Sctress test 12/1/2020: No ischemic EKG changes  - CT chest( 12/2/2020): left upper lobe anterior bulla/focus of paraseptal emphysema now measuring 6.6 x 6.0 cm. No significant left lower lobe consolidation or left pleural effusion. peripheral predominant opacities    # Paroxysmal atrial fibrillation  - On Pradaxa 150mg PO twice daily and ASA 81 mg   - Plavix recently added after a recent TIA last month.   - Rate controlled currently    # COPD  - No increase in basal need of O2, no increased cough, fever, chills, or dyspnea so exacerbation is unlikely.   - Continue home inhalers.  - triple inhaler therapy  - CXR( on admission):  LLL opacity.   - CXR on 11/30: Worsening left pleural effusion/opacity.  - CXR - on 12/10: Stable bilateral opacities. Low lung volumes with elevated right hemidiaphragm. No pneumothorax..  - Pulmonology consult appreciated  - CT chest non contrast (12/2/2020):  left upper lobe anterior bulla/focus of paraseptal emphysema now measuring 6.6 x 6.0 cm. No significant left lower lobe consolidation or left pleural effusion. peripheral predominant opacities.  - f/u Pulm    # Chronic HFpEF  # HTN  # HLD  - Preserved EF according to Dr. Bishop's notes, although no echo report in [place].   - ECHO (12/2/2020): Hyperdynamic global left ventricular systolic function; EF of 70 %; Moderately increased LV wall thickness. Grade I diastolic dysfunction.  - Continue PO diuretics and home BP and cholesterol meds    # Bipolar Disorder  - Mood stable  - Continue home meds Lamictal 100mg PO daily, Seroquel 200mg at bedtime, and Ativan 0.5mg PO twice daily PRN    # GI ppx: Pepcid     # Diet: DASH/consistent carb  # Activity: IAT   # Dispo: Acute  # Code: FULL      74yo female pmhx of COPD on 5L home oxygen, paroxysmal afib, carotid stenosis (L 80%, R 40%), HFpEF (EF 70% Dec 2020), HTN, HLD, bipolar disorder currently s/p left TCAR procedure. Patient presented to the ED with c/o of pain while eating/swallowing and associated chest pain.     # COVID 19 Pneumonia  - COVID-19 PCR: Detected (12-07-20 @ 12:30)  - Oxygen requirement: 3lpm via NC saturating 100% (has COPD and uses O2 at home)  - Inflammatory Markers:  CRP: 6.71 (12-09-20) <-- 8.14 (12-09-20)  Procalcitonin: 0.09 (12-07-20)  Ferritin: 254 (12-09-20)  D-Dimer: 290 (12/11/20) <-- 207 (12-09-20) <-- 251 (12-09-20)  CK: 24 (12-07-20) <-- 36 (12-06-20) <--24 (12-04-20)  - Chest Imaging: CXR (12/10) - Stable bilateral opacities. Low lung volumes with elevated right hemidiaphragm. No pneumothorax.  - Dexamethasone: Started on 12/08  - Remdesivir: Started on 12/08 - completes course today  - Convalescent Plasma: s/p 2U convalescent plasma on 12/08  - Tocilizumab: not indicated  - Isolation Precaution     # Left ICA stenosis with TIA  - pt has carotid disease (following vascular Dr Huerta- was started on plavix and patient is already on asa and pradaxa. Was planned for  transcarotid artery revascularization on 12/15)  - TCAR was expedited while inpatient and was done on 12/04  - Patient was seen previously by vascular surgery during Oct 2020 admission for TIA workup which was negative for any neurological events. Carotid duplex during that admission showed >80% stenosis of left ICA and scheduled for TCAR Dec 15, 2020.  - Continue plavix, pradaxa, ASA  - Code stroke on 12/05 for aphasia - workup was negative for acute stroke;   - Vascular surgery following - called vascular surgery #[id number] : no further inpatient intervention needed, stent was assessed and has good flow; will follow up outpatient.     # Neck pain and swelling   - MRI neck (12/2/2020): 3.0 cm lobulated intraparotid mass with microcysts suspicious for pleomorphic adenoma, [place]'s tumor, intraparotid schwannoma, or oncocytoma.   - Dental eval appreciated (F/U with outpatient dentist for comprehensive dental care)  - ENT consult appreciated- pt requires parotid mass biopsy  - Neuro-IR - outpatient biopsy after resolution of COVID    # Chest pain  - most likely pleuritic (Dr. Hoffmann Cardiologist)  - Trops - x2 and BNP negative. Not volume overloaded, no CHF exacerbation.   - CXR:  LLL opacity.   - CXR on 11/30: Worsening left pleural effusion/opacity.  - CXR - on 12/10: Stable bilateral opacities. Low lung volumes with elevated right hemidiaphragm. No pneumothorax.  - ECHO (12/2/2020): Hyperdynamic global left ventricular systolic function; EF of 70 %; Moderately increased LV wall thickness. Grade I diastolic dysfunction.  - Sctress test 12/1/2020: No ischemic EKG changes  - CT chest( 12/2/2020): left upper lobe anterior bulla/focus of paraseptal emphysema now measuring 6.6 x 6.0 cm. No significant left lower lobe consolidation or left pleural effusion. peripheral predominant opacities    # Paroxysmal atrial fibrillation  - On Pradaxa 150mg PO twice daily and ASA 81 mg   - Plavix recently added after a recent TIA last month.   - Rate controlled currently    # COPD  - No increase in basal need of O2, no increased cough, fever, chills, or dyspnea so exacerbation is unlikely.   - Continue home inhalers.  - triple inhaler therapy  - CXR( on admission):  LLL opacity.   - CXR on 11/30: Worsening left pleural effusion/opacity.  - CXR - on 12/10: Stable bilateral opacities. Low lung volumes with elevated right hemidiaphragm. No pneumothorax..  - Pulmonology consult appreciated  - CT chest non contrast (12/2/2020):  left upper lobe anterior bulla/focus of paraseptal emphysema now measuring 6.6 x 6.0 cm. No significant left lower lobe consolidation or left pleural effusion. peripheral predominant opacities.  - f/u Pulm    # Chronic HFpEF  # HTN  # HLD  - Preserved EF according to Dr. Bishop's notes, although no echo report in [place].   - ECHO (12/2/2020): Hyperdynamic global left ventricular systolic function; EF of 70 %; Moderately increased LV wall thickness. Grade I diastolic dysfunction.  - Continue PO diuretics and home BP and cholesterol meds    # Bipolar Disorder  - Mood stable  - Continue home meds Lamictal 100mg PO daily, Seroquel 200mg at bedtime, and Ativan 0.5mg PO twice daily PRN    # GI ppx: Pepcid     # Diet: DASH/consistent carb  # Activity: IAT   # Dispo: Home with home care and isolation  # Code: FULL      74yo female pmhx of COPD on 5L home oxygen, paroxysmal afib, carotid stenosis (L 80%, R 40%), HFpEF (EF 70% Dec 2020), HTN, HLD, bipolar disorder currently s/p left TCAR procedure. Patient presented to the ED with c/o of pain while eating/swallowing and associated chest pain.     # COVID 19 Pneumonia  - COVID-19 PCR: Detected (12-07-20 @ 12:30)  - Oxygen requirement: 3lpm via NC saturating 100% (has COPD and uses O2 at home)  - Inflammatory Markers:  CRP: 6.71 (12-09-20) <-- 8.14 (12-09-20)  Procalcitonin: 0.09 (12-07-20)  Ferritin: 254 (12-09-20)  D-Dimer: 290 (12/11/20) <-- 207 (12-09-20) <-- 251 (12-09-20)  CK: 24 (12-07-20) <-- 36 (12-06-20) <--24 (12-04-20)  - Chest Imaging: CXR (12/10) - Stable bilateral opacities. Low lung volumes with elevated right hemidiaphragm. No pneumothorax.  - Dexamethasone: Started on 12/08  - Remdesivir: Started on 12/08 - completes course today  - Convalescent Plasma: s/p 2U convalescent plasma on 12/08  - Tocilizumab: not indicated  - Isolation Precaution     # Left ICA stenosis with TIA  - pt has carotid disease (following vascular Dr Huerta- was started on plavix and patient is already on asa and pradaxa. Was planned for  transcarotid artery revascularization on 12/15)  - TCAR was expedited while inpatient and was done on 12/04  - Patient was seen previously by vascular surgery during Oct 2020 admission for TIA workup which was negative for any neurological events. Carotid duplex during that admission showed >80% stenosis of left ICA and scheduled for TCAR Dec 15, 2020.  - Continue plavix, pradaxa, ASA  - Code stroke on 12/05 for aphasia - workup was negative for acute stroke;   - Vascular surgery following - called vascular surgery #[id number] : no further inpatient intervention needed, stent was assessed and has good flow; will follow up outpatient.     # Neck pain and swelling   - MRI neck (12/2/2020): 3.0 cm lobulated intraparotid mass with microcysts suspicious for pleomorphic adenoma, [place]'s tumor, intraparotid schwannoma, or oncocytoma.   - Dental eval appreciated (F/U with outpatient dentist for comprehensive dental care)  - ENT consult appreciated- pt requires parotid mass biopsy  - Neuro-IR - outpatient biopsy after resolution of COVID    # Hypokalemia - mild  - K+ - 3.4  - will give ora potassium chloride    # Chest pain  - most likely pleuritic (Dr. Hoffmann Cardiologist)  - Trops - x2 and BNP negative. Not volume overloaded, no CHF exacerbation.   - CXR:  LLL opacity.   - CXR on 11/30: Worsening left pleural effusion/opacity.  - CXR - on 12/10: Stable bilateral opacities. Low lung volumes with elevated right hemidiaphragm. No pneumothorax.  - ECHO (12/2/2020): Hyperdynamic global left ventricular systolic function; EF of 70 %; Moderately increased LV wall thickness. Grade I diastolic dysfunction.  - Sctress test 12/1/2020: No ischemic EKG changes  - CT chest( 12/2/2020): left upper lobe anterior bulla/focus of paraseptal emphysema now measuring 6.6 x 6.0 cm. No significant left lower lobe consolidation or left pleural effusion. peripheral predominant opacities    # Paroxysmal atrial fibrillation  - On Pradaxa 150mg PO twice daily and ASA 81 mg   - Plavix recently added after a recent TIA last month.   - Rate controlled currently    # COPD  - No increase in basal need of O2, no increased cough, fever, chills, or dyspnea so exacerbation is unlikely.   - Continue home inhalers.  - triple inhaler therapy  - CXR( on admission):  LLL opacity.   - CXR on 11/30: Worsening left pleural effusion/opacity.  - CXR - on 12/10: Stable bilateral opacities. Low lung volumes with elevated right hemidiaphragm. No pneumothorax..  - Pulmonology consult appreciated  - CT chest non contrast (12/2/2020):  left upper lobe anterior bulla/focus of paraseptal emphysema now measuring 6.6 x 6.0 cm. No significant left lower lobe consolidation or left pleural effusion. peripheral predominant opacities.  - f/u Pulm    # Chronic HFpEF  # HTN  # HLD  - Preserved EF according to Dr. Bishop's notes, although no echo report in [place].   - ECHO (12/2/2020): Hyperdynamic global left ventricular systolic function; EF of 70 %; Moderately increased LV wall thickness. Grade I diastolic dysfunction.  - Continue PO diuretics and home BP and cholesterol meds    # Bipolar Disorder  - Mood stable  - Continue home meds Lamictal 100mg PO daily, Seroquel 200mg at bedtime, and Ativan 0.5mg PO twice daily PRN    # GI ppx: Pepcid     # Diet: DASH/consistent carb  # Activity: IAT   # Dispo: Home with home care and isolation  # Code: FULL

## 2020-12-12 NOTE — PROGRESS NOTE ADULT - SUBJECTIVE AND OBJECTIVE BOX
SUBJECTIVE:   LENGTH OF HOSPITAL STAY: 13d    CHIEF COMPLAINT:  Patient is a 75y old  Female who presents with a chief complaint of Odynophagia associated with chest pain (11 Dec 2020 13:48)      Events over the past 24 hours:  Patient was seen and examined at the bedside this morning.    REVIEW OF SYSTEMS  Negative Except as mentioned above.    ALLERGIES:  codeine (Other (Moderate))  Depakote (Unknown)  Dilaudid (Short breath; Rash)  IV Contrast (Anaphylaxis)  losartan (Angioedema)  Risperdal (Other)  verapamil (Short breath; Angioedema)        MEDICATIONS:  STANDING MEDICATIONS  ALBUTerol    90 MICROgram(s) HFA Inhaler 2 Puff(s) Inhalation every 6 hours  aspirin  chewable 81 milliGRAM(s) Oral daily  atorvastatin 20 milliGRAM(s) Oral at bedtime  budesonide 160 MICROgram(s)/formoterol 4.5 MICROgram(s) Inhaler 2 Puff(s) Inhalation two times a day  chlorhexidine 4% Liquid 1 Application(s) Topical <User Schedule>  clopidogrel Tablet 75 milliGRAM(s) Oral daily  dabigatran 150 milliGRAM(s) Oral every 12 hours  dexAMETHasone  Injectable 6 milliGRAM(s) IV Push daily  famotidine    Tablet 20 milliGRAM(s) Oral daily  furosemide    Tablet 40 milliGRAM(s) Oral daily  gabapentin 300 milliGRAM(s) Oral three times a day  ipratropium 17 MICROgram(s) HFA Inhaler 1 Puff(s) Inhalation every 6 hours  lamoTRIgine 100 milliGRAM(s) Oral daily  metoprolol tartrate 12.5 milliGRAM(s) Oral every 8 hours  multivitamin 1 Tablet(s) Oral daily  potassium phosphate / sodium phosphate Powder (PHOS-NaK) 1 Packet(s) Oral daily  QUEtiapine 200 milliGRAM(s) Oral at bedtime  remdesivir  IVPB 100 milliGRAM(s) IV Intermittent every 24 hours  tiotropium 18 MICROgram(s) Capsule 1 Capsule(s) Inhalation daily    PRN MEDICATIONS  acetaminophen   Tablet .. 650 milliGRAM(s) Oral every 6 hours PRN  bisacodyl 5 milliGRAM(s) Oral every 12 hours PRN        OBJECTIVE:  VITALS:   T(F): 97.1 (12-12-20 @ 04:00), Max: 98.4 (12-11-20 @ 20:05)  HR: 72 (12-12-20 @ 04:00) (72 - 104)  BP: 143/63 (12-12-20 @ 04:00) (107/57 - 143/63)  BP(mean): --  RR: 20 (12-12-20 @ 04:00) (20 - 20)  SpO2: 100% (12-12-20 @ 04:00) (96% - 100%)    I&O's:   I&O's Summary    10 Dec 2020 07:01  -  11 Dec 2020 07:00  --------------------------------------------------------  IN: 480 mL / OUT: 0 mL / NET: 480 mL    11 Dec 2020 07:01  -  12 Dec 2020 06:27  --------------------------------------------------------  IN: 700 mL / OUT: 500 mL / NET: 200 mL        PHYSICAL EXAM:  General: Not in distress; Pallor (-), Icterus (-), Cyanosis (-), Clubbing (-)  HEENT: Pupils equal, round and reactive to light symmetrically, Tender, firm lump on left side of neck, EOM - Normal bilaterally; Hearing - b/l normal; No external discharge noted, JVD (-), Lymphadenopathy(+)  PULM: Bilaterally equal and clear breath sounds, no wheeze, rubs or crackles.   CVS: Normal S1 and S2, no murmurs, rubs, or gallops.   GI: Soft, nondistended, nontender, BS +  MSK: Edema (+), no joint or muscle tenderness  SKIN: Warm and well perfused, no rashes noted  NEURO:  Alert and Oriented x 3, but confused; Cranial Nerves are all grossly intact; Normal strength and sensation in all four extremities.    LABS:                        11.9   7.95  )-----------( 183      ( 11 Dec 2020 08:14 )             38.1             12-11    143  |  108  |  23<H>  ----------------------------<  87  3.9   |  24  |  0.8    Ca    8.3<L>      11 Dec 2020 08:14  Mg     1.9     12-11    TPro  5.6<L>  /  Alb  3.3<L>  /  TBili  0.3  /  DBili  x   /  AST  26  /  ALT  16  /  AlkPhos  138<H>  12-11    LIVER FUNCTIONS - ( 11 Dec 2020 08:14 )  Alb: 3.3 g/dL / Pro: 5.6 g/dL / ALK PHOS: 138 U/L / ALT: 16 U/L / AST: 26 U/L / GGT: x                             Blood Glucose:  178 (12-11-20 @ 21:17)  146 (12-10-20 @ 16:31)        RADIOLOGY & ADDITIONAL TESTS:                         SUBJECTIVE:   LENGTH OF HOSPITAL STAY: 13d    CHIEF COMPLAINT:  Patient is a 75y old  Female who presents with a chief complaint of Odynophagia associated with chest pain (11 Dec 2020 13:48)      Events over the past 24 hours:  Patient was seen and examined at the bedside this morning. She is lying comfortably on the bed. She is saturating 100% on O2 at 3lpm via NC. She has no chest pain, sob, c/o neck pain over the parotid lump. She has dry cough. There were no acute events overnight.     REVIEW OF SYSTEMS  Negative Except as mentioned above.    ALLERGIES:  codeine (Other (Moderate))  Depakote (Unknown)  Dilaudid (Short breath; Rash)  IV Contrast (Anaphylaxis)  losartan (Angioedema)  Risperdal (Other)  verapamil (Short breath; Angioedema)        MEDICATIONS:  STANDING MEDICATIONS  ALBUTerol    90 MICROgram(s) HFA Inhaler 2 Puff(s) Inhalation every 6 hours  aspirin  chewable 81 milliGRAM(s) Oral daily  atorvastatin 20 milliGRAM(s) Oral at bedtime  budesonide 160 MICROgram(s)/formoterol 4.5 MICROgram(s) Inhaler 2 Puff(s) Inhalation two times a day  chlorhexidine 4% Liquid 1 Application(s) Topical <User Schedule>  clopidogrel Tablet 75 milliGRAM(s) Oral daily  dabigatran 150 milliGRAM(s) Oral every 12 hours  dexAMETHasone  Injectable 6 milliGRAM(s) IV Push daily  famotidine    Tablet 20 milliGRAM(s) Oral daily  furosemide    Tablet 40 milliGRAM(s) Oral daily  gabapentin 300 milliGRAM(s) Oral three times a day  ipratropium 17 MICROgram(s) HFA Inhaler 1 Puff(s) Inhalation every 6 hours  lamoTRIgine 100 milliGRAM(s) Oral daily  metoprolol tartrate 12.5 milliGRAM(s) Oral every 8 hours  multivitamin 1 Tablet(s) Oral daily  potassium phosphate / sodium phosphate Powder (PHOS-NaK) 1 Packet(s) Oral daily  QUEtiapine 200 milliGRAM(s) Oral at bedtime  remdesivir  IVPB 100 milliGRAM(s) IV Intermittent every 24 hours  tiotropium 18 MICROgram(s) Capsule 1 Capsule(s) Inhalation daily    PRN MEDICATIONS  acetaminophen   Tablet .. 650 milliGRAM(s) Oral every 6 hours PRN  bisacodyl 5 milliGRAM(s) Oral every 12 hours PRN        OBJECTIVE:  VITALS:   T(F): 97.1 (12-12-20 @ 04:00), Max: 98.4 (12-11-20 @ 20:05)  HR: 72 (12-12-20 @ 04:00) (72 - 104)  BP: 143/63 (12-12-20 @ 04:00) (107/57 - 143/63)  BP(mean): --  RR: 20 (12-12-20 @ 04:00) (20 - 20)  SpO2: 100% (12-12-20 @ 04:00) (96% - 100%)    I&O's:   I&O's Summary    10 Dec 2020 07:01  -  11 Dec 2020 07:00  --------------------------------------------------------  IN: 480 mL / OUT: 0 mL / NET: 480 mL    11 Dec 2020 07:01  -  12 Dec 2020 06:27  --------------------------------------------------------  IN: 700 mL / OUT: 500 mL / NET: 200 mL        PHYSICAL EXAM:  General: Not in distress; Pallor (-), Icterus (-), Cyanosis (-), Clubbing (-)  HEENT: Pupils equal, round and reactive to light symmetrically, Tender, firm lump on left side of neck, EOM - Normal bilaterally; Hearing - b/l normal; No external discharge noted, JVD (-), Lymphadenopathy(+)  PULM: Bilaterally diminished breath sounds, mild basal crackles, no wheeze, rubs or crackles.   CVS: Normal S1 and S2, no murmurs, rubs, or gallops.   GI: Soft, nondistended, nontender, BS +  MSK: Edema (+), no joint or muscle tenderness  SKIN: Warm and well perfused, no rashes noted  NEURO:  Alert and Oriented x 3; Cranial Nerves are all grossly intact; Normal strength and sensation in all four extremities.    LABS:                        11.9   7.95  )-----------( 183      ( 11 Dec 2020 08:14 )             38.1             12-11    143  |  108  |  23<H>  ----------------------------<  87  3.9   |  24  |  0.8    Ca    8.3<L>      11 Dec 2020 08:14  Mg     1.9     12-11    TPro  5.6<L>  /  Alb  3.3<L>  /  TBili  0.3  /  DBili  x   /  AST  26  /  ALT  16  /  AlkPhos  138<H>  12-11    LIVER FUNCTIONS - ( 11 Dec 2020 08:14 )  Alb: 3.3 g/dL / Pro: 5.6 g/dL / ALK PHOS: 138 U/L / ALT: 16 U/L / AST: 26 U/L / GGT: x                             Blood Glucose:  178 (12-11-20 @ 21:17)  146 (12-10-20 @ 16:31)        RADIOLOGY & ADDITIONAL TESTS:                         SUBJECTIVE:   LENGTH OF HOSPITAL STAY: 13d    CHIEF COMPLAINT:  Patient is a 75y old  Female who presents with a chief complaint of Odynophagia associated with chest pain (11 Dec 2020 13:48)      Events over the past 24 hours:  Patient was seen and examined at the bedside this morning. She is lying comfortably on the bed. She is saturating 100% on O2 at 3lpm via NC. She has no chest pain, sob, c/o neck pain over the parotid lump. She has dry cough. There were no acute events overnight.     REVIEW OF SYSTEMS  Negative Except as mentioned above.    ALLERGIES:  codeine (Other (Moderate))  Depakote (Unknown)  Dilaudid (Short breath; Rash)  IV Contrast (Anaphylaxis)  losartan (Angioedema)  Risperdal (Other)  verapamil (Short breath; Angioedema)        MEDICATIONS:  STANDING MEDICATIONS  ALBUTerol    90 MICROgram(s) HFA Inhaler 2 Puff(s) Inhalation every 6 hours  aspirin  chewable 81 milliGRAM(s) Oral daily  atorvastatin 20 milliGRAM(s) Oral at bedtime  budesonide 160 MICROgram(s)/formoterol 4.5 MICROgram(s) Inhaler 2 Puff(s) Inhalation two times a day  chlorhexidine 4% Liquid 1 Application(s) Topical <User Schedule>  clopidogrel Tablet 75 milliGRAM(s) Oral daily  dabigatran 150 milliGRAM(s) Oral every 12 hours  dexAMETHasone  Injectable 6 milliGRAM(s) IV Push daily  famotidine    Tablet 20 milliGRAM(s) Oral daily  furosemide    Tablet 40 milliGRAM(s) Oral daily  gabapentin 300 milliGRAM(s) Oral three times a day  ipratropium 17 MICROgram(s) HFA Inhaler 1 Puff(s) Inhalation every 6 hours  lamoTRIgine 100 milliGRAM(s) Oral daily  metoprolol tartrate 12.5 milliGRAM(s) Oral every 8 hours  multivitamin 1 Tablet(s) Oral daily  potassium phosphate / sodium phosphate Powder (PHOS-NaK) 1 Packet(s) Oral daily  QUEtiapine 200 milliGRAM(s) Oral at bedtime  remdesivir  IVPB 100 milliGRAM(s) IV Intermittent every 24 hours  tiotropium 18 MICROgram(s) Capsule 1 Capsule(s) Inhalation daily    PRN MEDICATIONS  acetaminophen   Tablet .. 650 milliGRAM(s) Oral every 6 hours PRN  bisacodyl 5 milliGRAM(s) Oral every 12 hours PRN        OBJECTIVE:  VITALS:   T(F): 97.1 (12-12-20 @ 04:00), Max: 98.4 (12-11-20 @ 20:05)  HR: 72 (12-12-20 @ 04:00) (72 - 104)  BP: 143/63 (12-12-20 @ 04:00) (107/57 - 143/63)  BP(mean): --  RR: 20 (12-12-20 @ 04:00) (20 - 20)  SpO2: 100% (12-12-20 @ 04:00) (96% - 100%)    I&O's:   I&O's Summary    10 Dec 2020 07:01  -  11 Dec 2020 07:00  --------------------------------------------------------  IN: 480 mL / OUT: 0 mL / NET: 480 mL    11 Dec 2020 07:01  -  12 Dec 2020 06:27  --------------------------------------------------------  IN: 700 mL / OUT: 500 mL / NET: 200 mL        PHYSICAL EXAM:  General: Not in distress; Pallor (-), Icterus (-), Cyanosis (-), Clubbing (-)  HEENT: Pupils equal, round and reactive to light symmetrically, Tender, firm lump on left side of neck, EOM - Normal bilaterally; Hearing - b/l normal; No external discharge noted, JVD (-), Lymphadenopathy(+)  PULM: Bilaterally diminished breath sounds, mild basal crackles, no wheeze, rubs or crackles.   CVS: Normal S1 and S2, no murmurs, rubs, or gallops.   GI: Soft, nondistended, nontender, BS +  MSK: bilateral mild pedal Edema (+), no joint or muscle tenderness  SKIN: Warm and well perfused, no rashes noted  NEURO:  Alert and Oriented x 3; Cranial Nerves are all grossly intact; Normal strength and sensation in all four extremities.    LABS:                        11.9   7.95  )-----------( 183      ( 11 Dec 2020 08:14 )             38.1             12-11    143  |  108  |  23<H>  ----------------------------<  87  3.9   |  24  |  0.8    Ca    8.3<L>      11 Dec 2020 08:14  Mg     1.9     12-11    TPro  5.6<L>  /  Alb  3.3<L>  /  TBili  0.3  /  DBili  x   /  AST  26  /  ALT  16  /  AlkPhos  138<H>  12-11    LIVER FUNCTIONS - ( 11 Dec 2020 08:14 )  Alb: 3.3 g/dL / Pro: 5.6 g/dL / ALK PHOS: 138 U/L / ALT: 16 U/L / AST: 26 U/L / GGT: x                             Blood Glucose:  178 (12-11-20 @ 21:17)  146 (12-10-20 @ 16:31)        RADIOLOGY & ADDITIONAL TESTS:

## 2020-12-12 NOTE — DISCHARGE NOTE NURSING/CASE MANAGEMENT/SOCIAL WORK - PATIENT PORTAL LINK FT
You can access the FollowMyHealth Patient Portal offered by [facility] by registering at the following website: [email or web address]. By joining [facility]’s FollowMyHealth portal, you will also be able to view your health information using other applications (apps) compatible with our system.

## 2020-12-12 NOTE — PROGRESS NOTE ADULT - ATTENDING COMMENTS
Patient seen and examined independently. Agree with resident note.  # Covid 19 infection-- patient has no symptoms. she remains on her home dose of oxygen 3L saturating 100%. She was treated with remdesivir completed course today and steroids.  #Lt ICA stenosis s/p TCAR-- on aspirin, plavix and pradax.  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  # Bipolar disorder on lamictal and seroquel.  # Copd on home oxygen-- requirement has not changed  and she ambulated with physical therapy.   Dc plan home today--spent more than 30mins.

## 2020-12-12 NOTE — CHART NOTE - NSCHARTNOTEFT_GEN_A_CORE
Registered Dietitian Follow-Up- Unable to conduct face to face assessment d/t limited contact restrictions/strict isolation precautions 2/2 COVID-19     Patient Profile Reviewed                          Yes [x]   No []     Nutrition History Previously Obtained        Yes [x]  No []       Pertinent Subjective Information: Poor po intake continues. eating 25-50% per RN.      Pertinent Medical Interventions: COVID 19 Pneumonia. Left ICA stenosis with TIA - vascular following, no intervention. Neck pain and swelling - pt requires parotid mass biopsy. Paroxysmal atrial fibrillation.      Diet order: Carb consistent, glucerna shake BID    Anthropometrics:  - Ht. 62"  - Wt. (): 186#/84.4kg--dosing wt   - BMI: 34.0 using dosing wt   - IBW: 110#     Daily Weight in k.2 (), Weight in k.1 (), Weight in k.3 (), Weight in k.7 () - weights trending up   % Weight Change    MEDICATIONS  (STANDING):  aspirin  chewable 81 milliGRAM(s) Oral daily  atorvastatin 20 milliGRAM(s) Oral at bedtime  budesonide 160 MICROgram(s)/formoterol 4.5 MICROgram(s) Inhaler 2 Puff(s) Inhalation two times a day  clopidogrel Tablet 75 milliGRAM(s) Oral daily  dabigatran 150 milliGRAM(s) Oral every 12 hours  dexAMETHasone  Injectable 6 milliGRAM(s) IV Push daily  famotidine    Tablet 20 milliGRAM(s) Oral daily  furosemide    Tablet 40 milliGRAM(s) Oral daily  ipratropium 17 MICROgram(s) HFA Inhaler 1 Puff(s) Inhalation every 6 hours  metoprolol tartrate 12.5 milliGRAM(s) Oral every 8 hours  multivitamin 1 Tablet(s) Oral daily    Pertinent Labs:  @ 06:37: Na 140, BUN 27<H>, Cr 0.8, <H>, K+ 3.4<L>, Phos 2.4, Mg 1.8, Alk Phos 130<H>, ALT/SGPT 15, AST/SGOT 18, HbA1c --    Finger Sticks:  POCT Blood Glucose.: 166 mg/dL ( @ 11:08)  POCT Blood Glucose.: 100 mg/dL ( @ 08:00)  POCT Blood Glucose.: 178 mg/dL ( @ 21:17)    Physical Findings:  - Appearance: alert and oriented; 1+ edema to B/L foot  - GI function: LBM   - Tubes: N/A   - Oral/Mouth cavity: none reported   - Skin: surgical incision      Nutrition Requirements  Weight Used: ABW: 84.4kg,  IBW: 50kg; needs continued from RD note on       CALORIE: [id number] kcal/day (MSJ x 1.1-1.2)   PROTEIN: 50-70 g/day (1.2-1.4 g/kg of ABW)     FLUID: fluid per BURN team     Nutrient Intake: not meeting kcal/pro needs at this time      Previous Nutrition Diagnosis: Inadequate Oral Intake (ongoing)      Nutrition Intervention: meals and snacks, medical food supplements     Goal/Expected Outcome: Pt to consume >50% of meal tray in 3 days      Indicator/Monitoring: RD to monitor energy intake, diet order, body comp, NFPF, glucose profile     Recommendation:   - Continue Carb consistent diet w/ glucerna shake BID

## 2020-12-13 VITALS
DIASTOLIC BLOOD PRESSURE: 78 MMHG | RESPIRATION RATE: 20 BRPM | HEART RATE: 61 BPM | SYSTOLIC BLOOD PRESSURE: 147 MMHG | TEMPERATURE: 97 F

## 2020-12-13 LAB — GLUCOSE BLDC GLUCOMTR-MCNC: 113 MG/DL — HIGH (ref 70–99)

## 2020-12-13 PROCEDURE — 99239 HOSP IP/OBS DSCHRG MGMT >30: CPT | Mod: CS

## 2020-12-13 RX ADMIN — Medication 6 MILLIGRAM(S): at 05:12

## 2020-12-13 RX ADMIN — DABIGATRAN ETEXILATE MESYLATE 150 MILLIGRAM(S): 150 CAPSULE ORAL at 05:12

## 2020-12-13 RX ADMIN — Medication 12.5 MILLIGRAM(S): at 05:11

## 2020-12-13 RX ADMIN — Medication 40 MILLIGRAM(S): at 05:12

## 2020-12-13 RX ADMIN — CHLORHEXIDINE GLUCONATE 1 APPLICATION(S): 213 SOLUTION TOPICAL at 05:12

## 2020-12-13 RX ADMIN — GABAPENTIN 300 MILLIGRAM(S): 400 CAPSULE ORAL at 05:12

## 2020-12-13 NOTE — PROGRESS NOTE ADULT - SUBJECTIVE AND OBJECTIVE BOX
SUBJECTIVE:    Patient is a 75y old Female who presents with a chief complaint of Odynophagia associated with chest pain (12 Dec 2020 06:26)    Currently admitted to medicine with the primary diagnosis of Chest pain       Today is hospital day 14d.     PAST MEDICAL & SURGICAL HISTORY  Falls    Congestive heart failure    Transient ischemic attack    FLAVIO on CPAP    Bipolar 1 disorder    Allergic reaction    PVD (peripheral vascular disease)    Other emphysema    Other cardiomyopathy    TIA (transient ischemic attack)    COPD (chronic obstructive pulmonary disease)    Depression    Hypertension    History of cholecystectomy      ALLERGIES:  codeine (Other (Moderate))  Depakote (Unknown)  Dilaudid (Short breath; Rash)  IV Contrast (Anaphylaxis)  losartan (Angioedema)  Risperdal (Other)  verapamil (Short breath; Angioedema)    MEDICATIONS:  STANDING MEDICATIONS  ALBUTerol    90 MICROgram(s) HFA Inhaler 2 Puff(s) Inhalation every 6 hours  aspirin  chewable 81 milliGRAM(s) Oral daily  atorvastatin 20 milliGRAM(s) Oral at bedtime  budesonide 160 MICROgram(s)/formoterol 4.5 MICROgram(s) Inhaler 2 Puff(s) Inhalation two times a day  chlorhexidine 4% Liquid 1 Application(s) Topical <User Schedule>  clopidogrel Tablet 75 milliGRAM(s) Oral daily  dabigatran 150 milliGRAM(s) Oral every 12 hours  dexAMETHasone  Injectable 6 milliGRAM(s) IV Push daily  famotidine    Tablet 20 milliGRAM(s) Oral daily  furosemide    Tablet 40 milliGRAM(s) Oral daily  gabapentin 300 milliGRAM(s) Oral three times a day  ipratropium 17 MICROgram(s) HFA Inhaler 1 Puff(s) Inhalation every 6 hours  lamoTRIgine 100 milliGRAM(s) Oral daily  metoprolol tartrate 12.5 milliGRAM(s) Oral every 8 hours  multivitamin 1 Tablet(s) Oral daily  QUEtiapine 200 milliGRAM(s) Oral at bedtime  tiotropium 18 MICROgram(s) Capsule 1 Capsule(s) Inhalation daily    PRN MEDICATIONS  acetaminophen   Tablet .. 650 milliGRAM(s) Oral every 6 hours PRN  bisacodyl 5 milliGRAM(s) Oral every 12 hours PRN  LORazepam     Tablet 0.5 milliGRAM(s) Oral every 12 hours PRN    VITALS:   T(F): 96.6  HR: 61  BP: 147/78  RR: 20  SpO2: 99%    LABS:                        10.8   5.23  )-----------( 215      ( 12 Dec 2020 06:37 )             33.5     12-12    140  |  107  |  27<H>  ----------------------------<  140<H>  3.4<L>   |  22  |  0.8    Ca    7.9<L>      12 Dec 2020 06:37  Phos  2.4     12-12  Mg     1.8     12-12    TPro  5.4<L>  /  Alb  3.1<L>  /  TBili  0.3  /  DBili  x   /  AST  18  /  ALT  15  /  AlkPhos  130<H>  12-12                  RADIOLOGY:    PHYSICAL EXAM:  GEN: No acute distress  LUNGS: Clear to auscultation bilaterally   HEART: S1/S2 present. RRR.   ABD/ GI: Soft, non-tender, non-distended. Bowel sounds present  EXT: No edema  NEURO: AAOX3

## 2020-12-13 NOTE — PROGRESS NOTE ADULT - ASSESSMENT
# Covid 19 infection-- patient has no symptoms. she remains on her home dose of oxygen 3L saturating 99%. She was treated with remdesivir completed course yesterday and steroids.  #Lt ICA stenosis s/p TCAR-- on aspirin, plavix and pradax.  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  # Bipolar disorder on lamictal and seroquel.  # Copd on home oxygen-- requirement has not changed  and she ambulated with physical therapy.   Dc plan home today--spent more than 30mins.

## 2020-12-13 NOTE — PROGRESS NOTE ADULT - REASON FOR ADMISSION
Odynophagia associated with chest pain
chest pain
Odynophagia associated with chest pain

## 2020-12-15 ENCOUNTER — APPOINTMENT (OUTPATIENT)
Dept: VASCULAR SURGERY | Facility: HOSPITAL | Age: 75
End: 2020-12-15

## 2020-12-17 NOTE — CDI QUERY NOTE - NSCDIOTHERTXTBX_GEN_ALL_CORE_HH
Patient admitted with chest pain ,also admits to sore throat and more frequent non-productive cough. States family members have tested positive for COVID this week, she had a negative test at an [facility] this morning.    - 11/29 Covid- Neg  - 12/7 Covid- positive  -CXR 11/29- Impression:Left basilar opacity.  -PN 12/1-  Chest pain atypical ruled out for MI   -12/2 CT Chest- IMPRESSION:  No significant left lower lobe consolidation or left pleural effusion.  New bilateral scattered small number of peripheral predominant opacities which are in a dependent position and may represent focal atelectasis.  Short-term follow-up is recommended.  - 12/12 PN- # COVID 19 Pneumonia                    # Left ICA stenosis with TIA                    pt has carotid disease  - TCAR was expedited while inpatient and was done on 12/04    Please clarify etiology of chest pain on admission-  - Pneumonia  -Other, specify__________  -Unable to determine

## 2020-12-22 ENCOUNTER — INPATIENT (INPATIENT)
Facility: HOSPITAL | Age: 75
LOS: 17 days | Discharge: NURSING, REHAB OR LONG-TERM CARE | End: 2021-01-09
Attending: INTERNAL MEDICINE | Admitting: INTERNAL MEDICINE
Payer: MEDICARE

## 2020-12-22 VITALS
SYSTOLIC BLOOD PRESSURE: 113 MMHG | HEIGHT: 62 IN | DIASTOLIC BLOOD PRESSURE: 72 MMHG | RESPIRATION RATE: 18 BRPM | HEART RATE: 95 BPM | OXYGEN SATURATION: 99 % | TEMPERATURE: 98 F

## 2020-12-22 DIAGNOSIS — A41.1 SEPSIS DUE TO OTHER SPECIFIED STAPHYLOCOCCUS: ICD-10-CM

## 2020-12-22 DIAGNOSIS — I42.8 OTHER CARDIOMYOPATHIES: ICD-10-CM

## 2020-12-22 DIAGNOSIS — U07.1 COVID-19: ICD-10-CM

## 2020-12-22 DIAGNOSIS — I65.23 OCCLUSION AND STENOSIS OF BILATERAL CAROTID ARTERIES: ICD-10-CM

## 2020-12-22 DIAGNOSIS — I48.0 PAROXYSMAL ATRIAL FIBRILLATION: ICD-10-CM

## 2020-12-22 DIAGNOSIS — Z90.49 ACQUIRED ABSENCE OF OTHER SPECIFIED PARTS OF DIGESTIVE TRACT: Chronic | ICD-10-CM

## 2020-12-22 DIAGNOSIS — I73.9 PERIPHERAL VASCULAR DISEASE, UNSPECIFIED: ICD-10-CM

## 2020-12-22 DIAGNOSIS — I48.20 CHRONIC ATRIAL FIBRILLATION, UNSPECIFIED: ICD-10-CM

## 2020-12-22 DIAGNOSIS — R13.10 DYSPHAGIA, UNSPECIFIED: ICD-10-CM

## 2020-12-22 DIAGNOSIS — Z86.73 PERSONAL HISTORY OF TRANSIENT ISCHEMIC ATTACK (TIA), AND CEREBRAL INFARCTION WITHOUT RESIDUAL DEFICITS: ICD-10-CM

## 2020-12-22 DIAGNOSIS — Z91.81 HISTORY OF FALLING: ICD-10-CM

## 2020-12-22 DIAGNOSIS — I50.32 CHRONIC DIASTOLIC (CONGESTIVE) HEART FAILURE: ICD-10-CM

## 2020-12-22 DIAGNOSIS — Z79.02 LONG TERM (CURRENT) USE OF ANTITHROMBOTICS/ANTIPLATELETS: ICD-10-CM

## 2020-12-22 DIAGNOSIS — I11.0 HYPERTENSIVE HEART DISEASE WITH HEART FAILURE: ICD-10-CM

## 2020-12-22 DIAGNOSIS — Z88.6 ALLERGY STATUS TO ANALGESIC AGENT: ICD-10-CM

## 2020-12-22 DIAGNOSIS — N39.0 URINARY TRACT INFECTION, SITE NOT SPECIFIED: ICD-10-CM

## 2020-12-22 DIAGNOSIS — Z91.041 RADIOGRAPHIC DYE ALLERGY STATUS: ICD-10-CM

## 2020-12-22 DIAGNOSIS — G47.33 OBSTRUCTIVE SLEEP APNEA (ADULT) (PEDIATRIC): ICD-10-CM

## 2020-12-22 DIAGNOSIS — Z79.01 LONG TERM (CURRENT) USE OF ANTICOAGULANTS: ICD-10-CM

## 2020-12-22 DIAGNOSIS — Z79.82 LONG TERM (CURRENT) USE OF ASPIRIN: ICD-10-CM

## 2020-12-22 DIAGNOSIS — J43.9 EMPHYSEMA, UNSPECIFIED: ICD-10-CM

## 2020-12-22 DIAGNOSIS — K11.9 DISEASE OF SALIVARY GLAND, UNSPECIFIED: ICD-10-CM

## 2020-12-22 DIAGNOSIS — A41.9 SEPSIS, UNSPECIFIED ORGANISM: ICD-10-CM

## 2020-12-22 DIAGNOSIS — N17.9 ACUTE KIDNEY FAILURE, UNSPECIFIED: ICD-10-CM

## 2020-12-22 DIAGNOSIS — R65.21 SEVERE SEPSIS WITH SEPTIC SHOCK: ICD-10-CM

## 2020-12-22 DIAGNOSIS — Z99.81 DEPENDENCE ON SUPPLEMENTAL OXYGEN: ICD-10-CM

## 2020-12-22 DIAGNOSIS — J12.82 PNEUMONIA DUE TO CORONAVIRUS DISEASE 2019: ICD-10-CM

## 2020-12-22 DIAGNOSIS — Z88.8 ALLERGY STATUS TO OTHER DRUGS, MEDICAMENTS AND BIOLOGICAL SUBSTANCES: ICD-10-CM

## 2020-12-22 DIAGNOSIS — E78.5 HYPERLIPIDEMIA, UNSPECIFIED: ICD-10-CM

## 2020-12-22 DIAGNOSIS — J96.21 ACUTE AND CHRONIC RESPIRATORY FAILURE WITH HYPOXIA: ICD-10-CM

## 2020-12-22 DIAGNOSIS — F31.70 BIPOLAR DISORDER, CURRENTLY IN REMISSION, MOST RECENT EPISODE UNSPECIFIED: ICD-10-CM

## 2020-12-22 DIAGNOSIS — E87.2 ACIDOSIS: ICD-10-CM

## 2020-12-22 LAB
ALBUMIN SERPL ELPH-MCNC: 3.7 G/DL — SIGNIFICANT CHANGE UP (ref 3.5–5.2)
ALP SERPL-CCNC: 100 U/L — SIGNIFICANT CHANGE UP (ref 30–115)
ALT FLD-CCNC: 17 U/L — SIGNIFICANT CHANGE UP (ref 0–41)
ANION GAP SERPL CALC-SCNC: 15 MMOL/L — HIGH (ref 7–14)
APTT BLD: 35.5 SEC — SIGNIFICANT CHANGE UP (ref 27–39.2)
AST SERPL-CCNC: 44 U/L — HIGH (ref 0–41)
BASE EXCESS BLDV CALC-SCNC: 1.9 MMOL/L — SIGNIFICANT CHANGE UP (ref -2–2)
BASOPHILS # BLD AUTO: 0.01 K/UL — SIGNIFICANT CHANGE UP (ref 0–0.2)
BASOPHILS NFR BLD AUTO: 0.2 % — SIGNIFICANT CHANGE UP (ref 0–1)
BILIRUB SERPL-MCNC: 0.6 MG/DL — SIGNIFICANT CHANGE UP (ref 0.2–1.2)
BUN SERPL-MCNC: 39 MG/DL — HIGH (ref 10–20)
CA-I SERPL-SCNC: 1.18 MMOL/L — SIGNIFICANT CHANGE UP (ref 1.12–1.3)
CALCIUM SERPL-MCNC: 9.1 MG/DL — SIGNIFICANT CHANGE UP (ref 8.5–10.1)
CHLORIDE SERPL-SCNC: 98 MMOL/L — SIGNIFICANT CHANGE UP (ref 98–110)
CO2 SERPL-SCNC: 25 MMOL/L — SIGNIFICANT CHANGE UP (ref 17–32)
CREAT SERPL-MCNC: 1.6 MG/DL — HIGH (ref 0.7–1.5)
D DIMER BLD IA.RAPID-MCNC: 301 NG/ML DDU — HIGH (ref 0–230)
EOSINOPHIL # BLD AUTO: 0.01 K/UL — SIGNIFICANT CHANGE UP (ref 0–0.7)
EOSINOPHIL NFR BLD AUTO: 0.2 % — SIGNIFICANT CHANGE UP (ref 0–8)
GAS PNL BLDV: 140 MMOL/L — SIGNIFICANT CHANGE UP (ref 136–145)
GAS PNL BLDV: SIGNIFICANT CHANGE UP
GLUCOSE SERPL-MCNC: 162 MG/DL — HIGH (ref 70–99)
HCO3 BLDV-SCNC: 28 MMOL/L — SIGNIFICANT CHANGE UP (ref 22–29)
HCT VFR BLD CALC: 38.8 % — SIGNIFICANT CHANGE UP (ref 37–47)
HCT VFR BLDA CALC: 37.2 % — SIGNIFICANT CHANGE UP (ref 34–44)
HGB BLD CALC-MCNC: 12.1 G/DL — LOW (ref 14–18)
HGB BLD-MCNC: 12.1 G/DL — SIGNIFICANT CHANGE UP (ref 12–16)
IMM GRANULOCYTES NFR BLD AUTO: 0.6 % — HIGH (ref 0.1–0.3)
INR BLD: 1.18 RATIO — SIGNIFICANT CHANGE UP (ref 0.65–1.3)
LACTATE BLDV-MCNC: 3 MMOL/L — HIGH (ref 0.5–1.6)
LDH SERPL L TO P-CCNC: 594 — HIGH (ref 50–242)
LYMPHOCYTES # BLD AUTO: 0.64 K/UL — LOW (ref 1.2–3.4)
LYMPHOCYTES # BLD AUTO: 9.8 % — LOW (ref 20.5–51.1)
MCHC RBC-ENTMCNC: 31.2 G/DL — LOW (ref 32–37)
MCHC RBC-ENTMCNC: 31.3 PG — HIGH (ref 27–31)
MCV RBC AUTO: 100.3 FL — HIGH (ref 81–99)
MONOCYTES # BLD AUTO: 0.35 K/UL — SIGNIFICANT CHANGE UP (ref 0.1–0.6)
MONOCYTES NFR BLD AUTO: 5.4 % — SIGNIFICANT CHANGE UP (ref 1.7–9.3)
NEUTROPHILS # BLD AUTO: 5.47 K/UL — SIGNIFICANT CHANGE UP (ref 1.4–6.5)
NEUTROPHILS NFR BLD AUTO: 83.8 % — HIGH (ref 42.2–75.2)
NRBC # BLD: 0 /100 WBCS — SIGNIFICANT CHANGE UP (ref 0–0)
PCO2 BLDV: 46 MMHG — SIGNIFICANT CHANGE UP (ref 41–51)
PH BLDV: 7.38 — SIGNIFICANT CHANGE UP (ref 7.26–7.43)
PLATELET # BLD AUTO: 206 K/UL — SIGNIFICANT CHANGE UP (ref 130–400)
PO2 BLDV: 49 MMHG — HIGH (ref 20–40)
POTASSIUM BLDV-SCNC: 4.9 MMOL/L — SIGNIFICANT CHANGE UP (ref 3.3–5.6)
POTASSIUM SERPL-MCNC: 4.5 MMOL/L — SIGNIFICANT CHANGE UP (ref 3.5–5)
POTASSIUM SERPL-SCNC: 4.5 MMOL/L — SIGNIFICANT CHANGE UP (ref 3.5–5)
PROT SERPL-MCNC: 6.5 G/DL — SIGNIFICANT CHANGE UP (ref 6–8)
PROTHROM AB SERPL-ACNC: 13.6 SEC — HIGH (ref 9.95–12.87)
RBC # BLD: 3.87 M/UL — LOW (ref 4.2–5.4)
RBC # FLD: 15 % — HIGH (ref 11.5–14.5)
SAO2 % BLDV: 83 % — SIGNIFICANT CHANGE UP
SODIUM SERPL-SCNC: 138 MMOL/L — SIGNIFICANT CHANGE UP (ref 135–146)
TROPONIN T SERPL-MCNC: <0.01 NG/ML — SIGNIFICANT CHANGE UP
WBC # BLD: 6.52 K/UL — SIGNIFICANT CHANGE UP (ref 4.8–10.8)
WBC # FLD AUTO: 6.52 K/UL — SIGNIFICANT CHANGE UP (ref 4.8–10.8)

## 2020-12-22 PROCEDURE — 93010 ELECTROCARDIOGRAM REPORT: CPT

## 2020-12-22 PROCEDURE — 71045 X-RAY EXAM CHEST 1 VIEW: CPT | Mod: 26

## 2020-12-22 PROCEDURE — 99291 CRITICAL CARE FIRST HOUR: CPT | Mod: CS

## 2020-12-22 RX ORDER — NOREPINEPHRINE BITARTRATE/D5W 8 MG/250ML
0.05 PLASTIC BAG, INJECTION (ML) INTRAVENOUS
Qty: 16 | Refills: 0 | Status: DISCONTINUED | OUTPATIENT
Start: 2020-12-22 | End: 2020-12-23

## 2020-12-22 RX ORDER — SODIUM CHLORIDE 9 MG/ML
3100 INJECTION, SOLUTION INTRAVENOUS ONCE
Refills: 0 | Status: COMPLETED | OUTPATIENT
Start: 2020-12-22 | End: 2020-12-22

## 2020-12-22 RX ORDER — CEFEPIME 1 G/1
2000 INJECTION, POWDER, FOR SOLUTION INTRAMUSCULAR; INTRAVENOUS ONCE
Refills: 0 | Status: COMPLETED | OUTPATIENT
Start: 2020-12-22 | End: 2020-12-22

## 2020-12-22 RX ADMIN — SODIUM CHLORIDE 3100 MILLILITER(S): 9 INJECTION, SOLUTION INTRAVENOUS at 22:02

## 2020-12-22 RX ADMIN — Medication 4.69 MICROGRAM(S)/KG/MIN: at 23:40

## 2020-12-22 RX ADMIN — CEFEPIME 100 MILLIGRAM(S): 1 INJECTION, POWDER, FOR SOLUTION INTRAMUSCULAR; INTRAVENOUS at 22:02

## 2020-12-22 NOTE — ED ADULT TRIAGE NOTE - CHIEF COMPLAINT QUOTE
Pt reports dx of covid-19 and being quarantined till the end of the month.  Pt reports mid-sternal chest pain, cough, sob, headache.

## 2020-12-23 LAB
ALBUMIN SERPL ELPH-MCNC: 3.1 G/DL — LOW (ref 3.5–5.2)
ALP SERPL-CCNC: 81 U/L — SIGNIFICANT CHANGE UP (ref 30–115)
ALT FLD-CCNC: 26 U/L — SIGNIFICANT CHANGE UP (ref 0–41)
ANION GAP SERPL CALC-SCNC: 9 MMOL/L — SIGNIFICANT CHANGE UP (ref 7–14)
APPEARANCE UR: CLEAR — SIGNIFICANT CHANGE UP
APTT BLD: 44.6 SEC — HIGH (ref 27–39.2)
AST SERPL-CCNC: 43 U/L — HIGH (ref 0–41)
BACTERIA # UR AUTO: NEGATIVE — SIGNIFICANT CHANGE UP
BASE EXCESS BLDV CALC-SCNC: SIGNIFICANT CHANGE UP MMOL/L (ref -2–2)
BASOPHILS # BLD AUTO: 0.01 K/UL — SIGNIFICANT CHANGE UP (ref 0–0.2)
BASOPHILS NFR BLD AUTO: 0.2 % — SIGNIFICANT CHANGE UP (ref 0–1)
BILIRUB SERPL-MCNC: 0.5 MG/DL — SIGNIFICANT CHANGE UP (ref 0.2–1.2)
BILIRUB UR-MCNC: NEGATIVE — SIGNIFICANT CHANGE UP
BUN SERPL-MCNC: 31 MG/DL — HIGH (ref 10–20)
CA-I SERPL-SCNC: 1.14 MMOL/L — SIGNIFICANT CHANGE UP (ref 1.12–1.3)
CALCIUM SERPL-MCNC: 8.4 MG/DL — LOW (ref 8.5–10.1)
CHLORIDE SERPL-SCNC: 106 MMOL/L — SIGNIFICANT CHANGE UP (ref 98–110)
CK SERPL-CCNC: 26 U/L — SIGNIFICANT CHANGE UP (ref 0–225)
CO2 SERPL-SCNC: 24 MMOL/L — SIGNIFICANT CHANGE UP (ref 17–32)
COLOR SPEC: SIGNIFICANT CHANGE UP
CREAT SERPL-MCNC: 1 MG/DL — SIGNIFICANT CHANGE UP (ref 0.7–1.5)
D DIMER BLD IA.RAPID-MCNC: 257 NG/ML DDU — HIGH (ref 0–230)
DIFF PNL FLD: NEGATIVE — SIGNIFICANT CHANGE UP
EOSINOPHIL # BLD AUTO: 0.05 K/UL — SIGNIFICANT CHANGE UP (ref 0–0.7)
EOSINOPHIL NFR BLD AUTO: 0.9 % — SIGNIFICANT CHANGE UP (ref 0–8)
EPI CELLS # UR: 2 /HPF — SIGNIFICANT CHANGE UP (ref 0–5)
GAS PNL BLDV: 138 MMOL/L — SIGNIFICANT CHANGE UP (ref 136–145)
GAS PNL BLDV: SIGNIFICANT CHANGE UP
GLUCOSE SERPL-MCNC: 97 MG/DL — SIGNIFICANT CHANGE UP (ref 70–99)
GLUCOSE UR QL: NEGATIVE — SIGNIFICANT CHANGE UP
HCO3 BLDV-SCNC: SIGNIFICANT CHANGE UP MMOL/L (ref 22–29)
HCT VFR BLD CALC: 32.7 % — LOW (ref 37–47)
HCT VFR BLDA CALC: 30.3 % — LOW (ref 34–44)
HGB BLD CALC-MCNC: 9.9 G/DL — LOW (ref 14–18)
HGB BLD-MCNC: 10.3 G/DL — LOW (ref 12–16)
HYALINE CASTS # UR AUTO: 1 /LPF — SIGNIFICANT CHANGE UP (ref 0–7)
IMM GRANULOCYTES NFR BLD AUTO: 0.5 % — HIGH (ref 0.1–0.3)
INR BLD: 1.41 RATIO — HIGH (ref 0.65–1.3)
KETONES UR-MCNC: NEGATIVE — SIGNIFICANT CHANGE UP
LACTATE BLDV-MCNC: SIGNIFICANT CHANGE UP MMOL/L (ref 0.5–1.6)
LACTATE SERPL-SCNC: 1.4 MMOL/L — SIGNIFICANT CHANGE UP (ref 0.7–2)
LDH SERPL L TO P-CCNC: 434 — HIGH (ref 50–242)
LEUKOCYTE ESTERASE UR-ACNC: ABNORMAL
LYMPHOCYTES # BLD AUTO: 0.85 K/UL — LOW (ref 1.2–3.4)
LYMPHOCYTES # BLD AUTO: 15.1 % — LOW (ref 20.5–51.1)
MAGNESIUM SERPL-MCNC: 1.8 MG/DL — SIGNIFICANT CHANGE UP (ref 1.8–2.4)
MCHC RBC-ENTMCNC: 31.1 PG — HIGH (ref 27–31)
MCHC RBC-ENTMCNC: 31.5 G/DL — LOW (ref 32–37)
MCV RBC AUTO: 98.8 FL — SIGNIFICANT CHANGE UP (ref 81–99)
MONOCYTES # BLD AUTO: 0.17 K/UL — SIGNIFICANT CHANGE UP (ref 0.1–0.6)
MONOCYTES NFR BLD AUTO: 3 % — SIGNIFICANT CHANGE UP (ref 1.7–9.3)
NEUTROPHILS # BLD AUTO: 4.52 K/UL — SIGNIFICANT CHANGE UP (ref 1.4–6.5)
NEUTROPHILS NFR BLD AUTO: 80.3 % — HIGH (ref 42.2–75.2)
NITRITE UR-MCNC: POSITIVE
NRBC # BLD: 0 /100 WBCS — SIGNIFICANT CHANGE UP (ref 0–0)
PCO2 BLDV: SIGNIFICANT CHANGE UP MMHG (ref 41–51)
PH BLDV: 7.31 — SIGNIFICANT CHANGE UP (ref 7.26–7.43)
PH UR: 6.5 — SIGNIFICANT CHANGE UP (ref 5–8)
PHOSPHATE SERPL-MCNC: 3.4 MG/DL — SIGNIFICANT CHANGE UP (ref 2.1–4.9)
PLATELET # BLD AUTO: 157 K/UL — SIGNIFICANT CHANGE UP (ref 130–400)
PO2 BLDV: 21 MMHG — SIGNIFICANT CHANGE UP (ref 20–40)
POTASSIUM BLDV-SCNC: 5.7 MMOL/L — HIGH (ref 3.3–5.6)
POTASSIUM SERPL-MCNC: 3.9 MMOL/L — SIGNIFICANT CHANGE UP (ref 3.5–5)
POTASSIUM SERPL-SCNC: 3.9 MMOL/L — SIGNIFICANT CHANGE UP (ref 3.5–5)
PROCALCITONIN SERPL-MCNC: 0.84 NG/ML — HIGH (ref 0.02–0.1)
PROT SERPL-MCNC: 5.3 G/DL — LOW (ref 6–8)
PROT UR-MCNC: SIGNIFICANT CHANGE UP
PROTHROM AB SERPL-ACNC: 16.2 SEC — HIGH (ref 9.95–12.87)
RBC # BLD: 3.31 M/UL — LOW (ref 4.2–5.4)
RBC # FLD: 14.8 % — HIGH (ref 11.5–14.5)
RBC CASTS # UR COMP ASSIST: 3 /HPF — SIGNIFICANT CHANGE UP (ref 0–4)
SAO2 % BLDV: 29 % — SIGNIFICANT CHANGE UP
SARS-COV-2 RNA SPEC QL NAA+PROBE: DETECTED
SODIUM SERPL-SCNC: 139 MMOL/L — SIGNIFICANT CHANGE UP (ref 135–146)
SP GR SPEC: 1.01 — SIGNIFICANT CHANGE UP (ref 1.01–1.03)
UROBILINOGEN FLD QL: SIGNIFICANT CHANGE UP
WBC # BLD: 5.63 K/UL — SIGNIFICANT CHANGE UP (ref 4.8–10.8)
WBC # FLD AUTO: 5.63 K/UL — SIGNIFICANT CHANGE UP (ref 4.8–10.8)
WBC UR QL: 6 /HPF — HIGH (ref 0–5)

## 2020-12-23 PROCEDURE — 99233 SBSQ HOSP IP/OBS HIGH 50: CPT

## 2020-12-23 PROCEDURE — 93010 ELECTROCARDIOGRAM REPORT: CPT

## 2020-12-23 RX ORDER — GABAPENTIN 400 MG/1
300 CAPSULE ORAL THREE TIMES A DAY
Refills: 0 | Status: DISCONTINUED | OUTPATIENT
Start: 2020-12-23 | End: 2021-01-09

## 2020-12-23 RX ORDER — TIOTROPIUM BROMIDE 18 UG/1
1 CAPSULE ORAL; RESPIRATORY (INHALATION) DAILY
Refills: 0 | Status: DISCONTINUED | OUTPATIENT
Start: 2020-12-23 | End: 2021-01-09

## 2020-12-23 RX ORDER — CEFEPIME 1 G/1
2000 INJECTION, POWDER, FOR SOLUTION INTRAMUSCULAR; INTRAVENOUS EVERY 8 HOURS
Refills: 0 | Status: DISCONTINUED | OUTPATIENT
Start: 2020-12-23 | End: 2020-12-23

## 2020-12-23 RX ORDER — PANTOPRAZOLE SODIUM 20 MG/1
40 TABLET, DELAYED RELEASE ORAL
Refills: 0 | Status: DISCONTINUED | OUTPATIENT
Start: 2020-12-23 | End: 2021-01-09

## 2020-12-23 RX ORDER — QUETIAPINE FUMARATE 200 MG/1
200 TABLET, FILM COATED ORAL AT BEDTIME
Refills: 0 | Status: DISCONTINUED | OUTPATIENT
Start: 2020-12-23 | End: 2021-01-09

## 2020-12-23 RX ORDER — HEPARIN SODIUM 5000 [USP'U]/ML
5000 INJECTION INTRAVENOUS; SUBCUTANEOUS EVERY 8 HOURS
Refills: 0 | Status: DISCONTINUED | OUTPATIENT
Start: 2020-12-23 | End: 2020-12-23

## 2020-12-23 RX ORDER — CEFEPIME 1 G/1
1000 INJECTION, POWDER, FOR SOLUTION INTRAMUSCULAR; INTRAVENOUS EVERY 12 HOURS
Refills: 0 | Status: DISCONTINUED | OUTPATIENT
Start: 2020-12-23 | End: 2020-12-29

## 2020-12-23 RX ORDER — BUDESONIDE AND FORMOTEROL FUMARATE DIHYDRATE 160; 4.5 UG/1; UG/1
2 AEROSOL RESPIRATORY (INHALATION)
Refills: 0 | Status: DISCONTINUED | OUTPATIENT
Start: 2020-12-23 | End: 2021-01-09

## 2020-12-23 RX ORDER — ATORVASTATIN CALCIUM 80 MG/1
20 TABLET, FILM COATED ORAL AT BEDTIME
Refills: 0 | Status: DISCONTINUED | OUTPATIENT
Start: 2020-12-23 | End: 2021-01-09

## 2020-12-23 RX ORDER — CLOPIDOGREL BISULFATE 75 MG/1
75 TABLET, FILM COATED ORAL DAILY
Refills: 0 | Status: DISCONTINUED | OUTPATIENT
Start: 2020-12-23 | End: 2021-01-09

## 2020-12-23 RX ORDER — MONTELUKAST 4 MG/1
10 TABLET, CHEWABLE ORAL DAILY
Refills: 0 | Status: DISCONTINUED | OUTPATIENT
Start: 2020-12-23 | End: 2021-01-09

## 2020-12-23 RX ORDER — ALBUTEROL 90 UG/1
1 AEROSOL, METERED ORAL EVERY 4 HOURS
Refills: 0 | Status: DISCONTINUED | OUTPATIENT
Start: 2020-12-23 | End: 2021-01-09

## 2020-12-23 RX ORDER — DABIGATRAN ETEXILATE MESYLATE 150 MG/1
150 CAPSULE ORAL EVERY 12 HOURS
Refills: 0 | Status: DISCONTINUED | OUTPATIENT
Start: 2020-12-23 | End: 2020-12-29

## 2020-12-23 RX ORDER — ACETAMINOPHEN 500 MG
650 TABLET ORAL EVERY 4 HOURS
Refills: 0 | Status: DISCONTINUED | OUTPATIENT
Start: 2020-12-23 | End: 2020-12-25

## 2020-12-23 RX ORDER — SODIUM CHLORIDE 9 MG/ML
1000 INJECTION INTRAMUSCULAR; INTRAVENOUS; SUBCUTANEOUS
Refills: 0 | Status: DISCONTINUED | OUTPATIENT
Start: 2020-12-23 | End: 2020-12-30

## 2020-12-23 RX ORDER — ENOXAPARIN SODIUM 100 MG/ML
40 INJECTION SUBCUTANEOUS DAILY
Refills: 0 | Status: DISCONTINUED | OUTPATIENT
Start: 2020-12-23 | End: 2020-12-23

## 2020-12-23 RX ORDER — ALBUTEROL 90 UG/1
1 AEROSOL, METERED ORAL EVERY 4 HOURS
Refills: 0 | Status: DISCONTINUED | OUTPATIENT
Start: 2020-12-23 | End: 2020-12-23

## 2020-12-23 RX ORDER — LAMOTRIGINE 25 MG/1
100 TABLET, ORALLY DISINTEGRATING ORAL DAILY
Refills: 0 | Status: DISCONTINUED | OUTPATIENT
Start: 2020-12-23 | End: 2021-01-09

## 2020-12-23 RX ORDER — ASPIRIN/CALCIUM CARB/MAGNESIUM 324 MG
81 TABLET ORAL DAILY
Refills: 0 | Status: DISCONTINUED | OUTPATIENT
Start: 2020-12-23 | End: 2021-01-09

## 2020-12-23 RX ORDER — ALBUTEROL 90 UG/1
1 AEROSOL, METERED ORAL EVERY 4 HOURS
Refills: 0 | Status: COMPLETED | OUTPATIENT
Start: 2020-12-23 | End: 2021-11-21

## 2020-12-23 RX ADMIN — LAMOTRIGINE 100 MILLIGRAM(S): 25 TABLET, ORALLY DISINTEGRATING ORAL at 11:52

## 2020-12-23 RX ADMIN — BUDESONIDE AND FORMOTEROL FUMARATE DIHYDRATE 2 PUFF(S): 160; 4.5 AEROSOL RESPIRATORY (INHALATION) at 09:26

## 2020-12-23 RX ADMIN — SODIUM CHLORIDE 75 MILLILITER(S): 9 INJECTION INTRAMUSCULAR; INTRAVENOUS; SUBCUTANEOUS at 06:23

## 2020-12-23 RX ADMIN — GABAPENTIN 300 MILLIGRAM(S): 400 CAPSULE ORAL at 06:03

## 2020-12-23 RX ADMIN — Medication 650 MILLIGRAM(S): at 18:21

## 2020-12-23 RX ADMIN — DABIGATRAN ETEXILATE MESYLATE 150 MILLIGRAM(S): 150 CAPSULE ORAL at 06:03

## 2020-12-23 RX ADMIN — Medication 0.5 MILLIGRAM(S): at 06:29

## 2020-12-23 RX ADMIN — Medication 4.69 MICROGRAM(S)/KG/MIN: at 06:23

## 2020-12-23 RX ADMIN — Medication 650 MILLIGRAM(S): at 12:30

## 2020-12-23 RX ADMIN — Medication 81 MILLIGRAM(S): at 11:54

## 2020-12-23 RX ADMIN — BUDESONIDE AND FORMOTEROL FUMARATE DIHYDRATE 2 PUFF(S): 160; 4.5 AEROSOL RESPIRATORY (INHALATION) at 20:15

## 2020-12-23 RX ADMIN — DABIGATRAN ETEXILATE MESYLATE 150 MILLIGRAM(S): 150 CAPSULE ORAL at 17:48

## 2020-12-23 RX ADMIN — GABAPENTIN 300 MILLIGRAM(S): 400 CAPSULE ORAL at 13:54

## 2020-12-23 RX ADMIN — Medication 650 MILLIGRAM(S): at 17:51

## 2020-12-23 RX ADMIN — Medication 650 MILLIGRAM(S): at 12:00

## 2020-12-23 RX ADMIN — Medication 1 TABLET(S): at 11:52

## 2020-12-23 RX ADMIN — CEFEPIME 100 MILLIGRAM(S): 1 INJECTION, POWDER, FOR SOLUTION INTRAMUSCULAR; INTRAVENOUS at 06:03

## 2020-12-23 RX ADMIN — PANTOPRAZOLE SODIUM 40 MILLIGRAM(S): 20 TABLET, DELAYED RELEASE ORAL at 06:02

## 2020-12-23 RX ADMIN — CLOPIDOGREL BISULFATE 75 MILLIGRAM(S): 75 TABLET, FILM COATED ORAL at 11:54

## 2020-12-23 RX ADMIN — Medication 650 MILLIGRAM(S): at 06:02

## 2020-12-23 RX ADMIN — CEFEPIME 100 MILLIGRAM(S): 1 INJECTION, POWDER, FOR SOLUTION INTRAMUSCULAR; INTRAVENOUS at 17:50

## 2020-12-23 RX ADMIN — Medication 30 MILLILITER(S): at 11:51

## 2020-12-23 RX ADMIN — TIOTROPIUM BROMIDE 1 CAPSULE(S): 18 CAPSULE ORAL; RESPIRATORY (INHALATION) at 09:25

## 2020-12-23 RX ADMIN — MONTELUKAST 10 MILLIGRAM(S): 4 TABLET, CHEWABLE ORAL at 11:52

## 2020-12-23 NOTE — ED PROVIDER NOTE - PHYSICAL EXAMINATION
CONSTITUTIONAL: chronic ill female; in no apparent distress.   EYES: PERRL; EOM intact.   CARDIOVASCULAR: Normal S1, S2; no murmurs, rubs, or gallops.   RESPIRATORY: RR -18. no accessory muscles use. b/l basilar rales . no resp discharge.   GI/: Normal bowel sounds; non-distended; non-tender; no palpable organomegaly.   MS: No evidence of trauma or deformity to extremities.    SKIN: Normal for age and race; warm; dry; good turgor; no apparent lesions or exudate.   NEURO/PSYCH: A & O x 4; grossly unremarkable. speaking coherently. move all extremities.

## 2020-12-23 NOTE — ED PROVIDER NOTE - OBJECTIVE STATEMENT
75 year old female w hx of COPD on 5 LPM NC at home (pulm - [place]), afib on Pradaxa , CAD (Cardio - Lefkovic), CHF, HTN, HLD presents to the ED for evaluation of intermittent burning mid sternal chest pain since this morning. Pt also admits to chronic non-productive cough. Patient currently tested positive for COVID on 12/7 and admitted to hospital and had left carotid procedure done 12/10 and discharged on 12/13. Denies fevers/chills, congestion, palpitations, abd pain, nausea, vomiting, diarrhea, leg pain/swelling.

## 2020-12-23 NOTE — ED PROVIDER NOTE - CARE PLAN
Principal Discharge DX:	Septic shock  Secondary Diagnosis:	UTI (urinary tract infection)  Secondary Diagnosis:	JOYCE (acute kidney injury)  Secondary Diagnosis:	COVID-19

## 2020-12-23 NOTE — H&P ADULT - NSHPLABSRESULTS_GEN_ALL_CORE
12.1   6.52  )-----------( 206      ( 22 Dec 2020 21:56 )             38.8           138  |  98  |  39<H>  ----------------------------<  162<H>  4.5   |  25  |  1.6<H>    Ca    9.1      22 Dec 2020 21:56    TPro  6.5  /  Alb  3.7  /  TBili  0.6  /  DBili  x   /  AST  44<H>  /  ALT  17  /  AlkPhos  100         Urinalysis Basic - ( 23 Dec 2020 00:07 )    Color: Light Yellow / Appearance: Clear / S.012 / pH: x  Gluc: x / Ketone: Negative  / Bili: Negative / Urobili: <2 mg/dL   Blood: x / Protein: Trace / Nitrite: Positive   Leuk Esterase: Moderate / RBC: x / WBC x   Sq Epi: x / Non Sq Epi: x / Bacteria: x      PT/INR - ( 22 Dec 2020 21:56 )   PT: 13.60 sec;   INR: 1.18 ratio         PTT - ( 22 Dec 2020 21:56 )  PTT:35.5 sec      CARDIAC MARKERS ( 22 Dec 2020 21:56 )  x     / <0.01 ng/mL / x     / x     / x      CAPILLARY BLOOD GLUCOSE    Culture Results:   No Growth Final ( @ 11:28)

## 2020-12-23 NOTE — ED PROVIDER NOTE - NS ED ROS FT
Constitutional: no fever, chills, no recent weight loss, change in appetite or malaise  Eyes: no redness/discharge/pain/vision changes  ENT: no rhinorrhea/ear pain/sore throat  Cardiac: see HPI  Respiratory: + cough No respiratory distress  GI: No nausea, vomiting, diarrhea or abdominal pain.  : No dysuria, frequency, urgency or hematuria  MS: no pain to back or extremities, no loss of ROM, no weakness  Neuro: No headache or weakness. No LOC.  Skin: No skin rash.  Endocrine: + diabetes.

## 2020-12-23 NOTE — H&P ADULT - HISTORY OF PRESENT ILLNESS
74yo female pmhx of COPD on 5L home oxygen, paroxysmal afib on pradaxa, carotid stenosis (L 80%, R 40%) s/p L carotid stenting recently , HFpEF (EF 70% Dec 2020), HTN, HLD, bipolar disorder ,left parotid mass ,  recently admitted between 12/7-12/11 for COVID PNA s/p steroids/RDV presented to the ED with chest pain       ED Course: initially vitally stable but she became hypotensive to 70/50 s/p 3L bolus >>remained hypotensive was started on low dose levo. Labs: BUN/Cr: 39/1.6, lactate :3, UA + Nitrite LE+ admitted for urosepsis and JOYCE   74yo female pmhx of COPD on 5L home oxygen, paroxysmal afib on pradaxa, carotid stenosis (L 80%, R 40%) s/p L carotid stenting recently , HFpEF (EF 70% Dec 2020), HTN, HLD, bipolar disorder ,left parotid mass ,  recently admitted between 12/7-12/11 for COVID PNA s/p steroids/RDV presented to the ED with chest pain , sharp in character , 8/10 intensity, non radiating, post tussive. Patient mentioned she has chronic non productive cough but yesterday afternoon she had pretty bad cough giving her chest pain. She also mentions she had 1 episode of subjective fever yesterday. Denies nausea/vomiting/chills/diarrhea/dysuria/urgency.       ED Course: initially vitally stable but she became hypotensive to 70/50 s/p 3L bolus >>remained hypotensive was started on low dose levo. Labs: BUN/Cr: 39/1.6, lactate :3, UA + Nitrite LE+ admitted for urosepsis and JOYCE

## 2020-12-23 NOTE — ED ADULT NURSE NOTE - NSIMPLEMENTINTERV_GEN_ALL_ED
Implemented All Fall with Harm Risk Interventions:  [place] to call system. Call bell, personal items and telephone within reach. Instruct patient to call for assistance. Room bathroom lighting operational. Non-slip footwear when patient is off stretcher. Physically safe environment: no spills, clutter or unnecessary equipment. Stretcher in lowest position, wheels locked, appropriate side rails in place. Provide visual cue, wrist band, yellow gown, etc. Monitor gait and stability. Monitor for mental status changes and reorient to person, place, and time. Review medications for side effects contributing to fall risk. Reinforce activity limits and safety measures with patient and family. Provide visual clues: red socks.

## 2020-12-23 NOTE — H&P ADULT - ATTENDING COMMENTS
Ms Henderson is a 74yo Woman with medical history of COPD on 5L home oxygen, paroxysmal afib on pradaxa, carotid stenosis (L 80%, R 40%) s/p L carotid stenting recently , HFpEF (EF 70% Dec 2020), HTN, HLD, bipolar disorder ,left parotid mass ,  recently admitted between 12/7-12/11 for COVID PNA s/p steroids/RDV presented to the ED with chest pain     #UTI with septic shock requiring low dose pressors - now discontinued  UA + nitrite, LE + SBP<90,lactate: 3 s/p 3L NS in ED  s/p cefepime and levaquin in ED  no CVA tenderness  fu Ucx, BCx  c/w cefepime, de escalate or taper abx as per cultures  c/w IVF    #Chest pain- likely pleuritic  most likely pleuritic (Dr. Hoffmann Cardiologist)  Trops - x1  negative. Not volume overloaded, no CHF exacerbation.   CXR - Stable bilateral opacities. Low lung volumes with elevated right hemidiaphragm. No pneumothorax.  ECHO (12/2/2020): Hyperdynamic global left ventricular systolic function; EF of 70 %; Moderately increased LV wall thickness. Grade I diastolic dysfunction.  Stress test 12/1/2020: No ischemic EKG changes  CT chest( 12/2/2020): left upper lobe anterior bulla/focus of paraseptal emphysema now measuring 6.6 x 6.0 cm. No significant left lower lobe consolidation or left pleural effusion. peripheral predominant opacities  Currently denies any CP and SOB improved    #JOYCE likely pre renal  baseline: 0.8-1.1  check ua / urine electrolytes + pr:cr  monitor renal function    #recent hx COVID 19 Pneumonia  at baseline O2 , still has pleuritic chest pain with chronic cough  s/p RDV/2u plasma/Steroids  Isolation Precaution     # Left ICA s/p TCAR (transcarotid artery revascularization) 12/04 with TIA last admission  c/w plavix, pradaxa, ASA  Code stroke on 12/05 for aphasia - workup was negative for acute stroke;     #Left intra parotid mass   needs outpt biopsy     #Paroxysmal atrial fibrillation- tachycardic as pt on levo  On Pradaxa 150mg PO twice daily and ASA 81 mg   Plavix recently added after a recent TIA last month.     # Chronic respiratory failure 2/2 COPD on home O2  No increase in basal need of O2, no increased cough, fever, chills, or dyspnea so exacerbation is unlikely.   Continue home inhalers.  triple inhaler therapy    # Chronic HFpEF  # HTN  # HLD  Preserved EF according to Dr. Bishop's notes, although no echo report in [place].   ECHO (12/2/2020): Hyperdynamic global left ventricular systolic function; EF of 70 %; Moderately increased LV wall thickness. Grade I diastolic dysfunction.  c/w Atorvastatin 20  Hold metoprolol and amlodipine    # Bipolar Disorder  Mood stable  c/w home meds Lamictal 100mg PO daily, Seroquel 200mg at bedtime, and Ativan 0.5mg PO twice daily PRN    I was physically present for the key portions of the evaluation and management (E/M) service provided.  I agree with the above history, physical, and plan which I have reviewed and edited where appropriate.

## 2020-12-23 NOTE — ED PROVIDER NOTE - PROGRESS NOTE DETAILS
Patient is drowsy but admitted she took her seroquel prior to ED arrival. d/w ICU Fellow Dr Brown and approved for step down Patient is hypotensive after 2L of LR. will started peripheral levophed BP improved after starting on levophed. HR has been stable over the past hour

## 2020-12-23 NOTE — H&P ADULT - NSHPPHYSICALEXAM_GEN_ALL_CORE
PHYSICAL EXAM:  GENERAL: NAD, AAO x 4, 75y F  HEAD:  Atraumatic, Normocephalic  EYES: EOMI, conjunctiva and sclera white  NECK: Supple, No JVD  CHEST/LUNG: Clear to auscultation bilaterally; No wheeze; No crackles; No accessory muscles used  HEART: Regular rate and rhythm; No murmurs;   ABDOMEN: Soft, Nontender, Nondistended; Bowel sounds present; No guarding, No organomegaly  EXTREMITIES:  2+ Peripheral Pulses, No cyanosis or edema  NEUROLOGY: non-focal

## 2020-12-23 NOTE — H&P ADULT - NSHPSOCIALHISTORY_GEN_ALL_CORE
Marital Status:  (   )    (   ) Single    (   )    (  )   Lives with: (  ) alone  (  ) children   (  ) spouse   (  ) parents  (  ) other  Recent Travel: No recent travel  Occupation:    Substance Use (street drugs): ( x ) never used  (  ) other:  Tobacco Usage:  ( x  ) never smoked   (   ) former smoker   (   ) current smoker  (     ) pack year  Alcohol Usage: None   Baseline mobility:

## 2020-12-23 NOTE — ED PROVIDER NOTE - ATTENDING CONTRIBUTION TO CARE
I personally evaluated the patient. I reviewed the Resident’s or Physician Assistant’s note (as assigned above), and agree with the findings and plan except as documented in my note.  74 yo woman with recent recovery from COVID after diagnosis on dec 7th.   Recently admitted for TIA and had cartoid endarterectomy after having left ICA occlusion.  Has paroxysmal Afib on dabigatran.  No presents with Afib with RVR.  In addition, was complaining of sharp substernal chest pain with mild SOB.  Initally BP was normal.  Later noted to have hypotension.  IVF, empiric antibiotics started.  Eventually, patient required IV low dose vasopressor support.  Found to have UTI  Lactate 3.  Likely urosepsis.  ICU called.  Approved stepdown.

## 2020-12-23 NOTE — H&P ADULT - ASSESSMENT
74yo female pmhx of COPD on 5L home oxygen, paroxysmal afib on pradaxa, carotid stenosis (L 80%, R 40%) s/p L carotid stenting recently , HFpEF (EF 70% Dec 2020), HTN, HLD, bipolar disorder ,left parotid mass ,  recently admitted between 12/7-12/11 for COVID PNA s/p steroids/RDV presented to the ED with chest pain       ED Course: initially vitally stable but she became hypotensive to 70/50 s/p 3L bolus >>remained hypotensive was started on low dose levo. Labs: BUN/Cr: 39/1.6, lactate :3, UA + Nitrite LE+ admitted for urosepsis and JOYCE    #Urosepsis requiring low dose pressors  - UA + nitrite, LE + SBP<90,lactate: 3 s/p 3L NS in ED  - s/p cefepime and levaquin in ED    PLAN  fu Ucx, BCx  will c/w cefepime, de escalate or taper abx as per cultures  monitor Bp, taper pressors      #recent hx COVID 19 Pneumonia  - at baseline O2 , still has pleuritic chest pain with chronic cough  - s/p RDV/2u plasma/Steroids  - Isolation Precaution     # Left ICA s/p TCAR (transcarotid artery revascularization) 12/04 with TIA last admission  - Continue plavix, pradaxa, ASA  - Code stroke on 12/05 for aphasia - workup was negative for acute stroke;     #Left intra parotid mass   - needs outpt biopsy       # Chest pain  - most likely pleuritic (Dr. Hoffmann Cardiologist)  - Trops - x2 and BNP negative. Not volume overloaded, no CHF exacerbation.   - CXR:  LLL opacity.   - CXR on 11/30: Worsening left pleural effusion/opacity.  - CXR - on 12/10: Stable bilateral opacities. Low lung volumes with elevated right hemidiaphragm. No pneumothorax.  - ECHO (12/2/2020): Hyperdynamic global left ventricular systolic function; EF of 70 %; Moderately increased LV wall thickness. Grade I diastolic dysfunction.  - Sctress test 12/1/2020: No ischemic EKG changes  - CT chest( 12/2/2020): left upper lobe anterior bulla/focus of paraseptal emphysema now measuring 6.6 x 6.0 cm. No significant left lower lobe consolidation or left pleural effusion. peripheral predominant opacities    # Paroxysmal atrial fibrillation  - On Pradaxa 150mg PO twice daily and ASA 81 mg   - Plavix recently added after a recent TIA last month.   - Rate controlled currently    # COPD on home O2  - No increase in basal need of O2, no increased cough, fever, chills, or dyspnea so exacerbation is unlikely.   - Continue home inhalers.  - triple inhaler therapy    # Chronic HFpEF  # HTN  # HLD  - Preserved EF according to Dr. Bishop's notes, although no echo report in [place].   - ECHO (12/2/2020): Hyperdynamic global left ventricular systolic function; EF of 70 %; Moderately increased LV wall thickness. Grade I diastolic dysfunction.  - Continue PO diuretics and home BP and cholesterol meds    # Bipolar Disorder  - Mood stable  - Continue home meds Lamictal 100mg PO daily, Seroquel 200mg at bedtime, and Ativan 0.5mg PO twice daily PRN    # GI ppx: Pepcid     # Diet: DASH/consistent carb  # Activity: IAT (within room)  # Dispo: Home  # Code: FULL    74yo female pmhx of COPD on 5L home oxygen, paroxysmal afib on pradaxa, carotid stenosis (L 80%, R 40%) s/p L carotid stenting recently , HFpEF (EF 70% Dec 2020), HTN, HLD, bipolar disorder ,left parotid mass ,  recently admitted between 12/7-12/11 for COVID PNA s/p steroids/RDV presented to the ED with chest pain       ED Course: initially vitally stable but she became hypotensive to 70/50 s/p 3L bolus >>remained hypotensive was started on low dose levo. Labs: BUN/Cr: 39/1.6, lactate :3, UA + Nitrite LE+ admitted for urosepsis and JOYCE    #UTI with septic shock requiring low dose pressors  - UA + nitrite, LE + SBP<90,lactate: 3 s/p 3L NS in ED  - s/p cefepime and levaquin in ED  - no CVA tenderness    PLAN  fu Ucx, BCx  will c/w cefepime, de escalate or taper abx as per cultures  monitor Bp, taper pressors, c/w IVF  - fu repeat lactate      # Chest pain- likely pleuritic  - most likely pleuritic (Dr. Hoffmann Cardiologist)  - Trops - x1  negative. Not volume overloaded, no CHF exacerbation.   - CXR - Stable bilateral opacities. Low lung volumes with elevated right hemidiaphragm. No pneumothorax.  - ECHO (12/2/2020): Hyperdynamic global left ventricular systolic function; EF of 70 %; Moderately increased LV wall thickness. Grade I diastolic dysfunction.  - Sctress test 12/1/2020: No ischemic EKG changes  - CT chest( 12/2/2020): left upper lobe anterior bulla/focus of paraseptal emphysema now measuring 6.6 x 6.0 cm. No significant left lower lobe consolidation or left pleural effusion. peripheral predominant opacities  - fu trop in am  #JOYCE likely pre renal  --  baseline: 0.8-1.1  --  check ua / urine electrolytes + pr:cr  --  trend bmp / scr  --  ivf: iv ns  --  nephro eval if no improvement    #recent hx COVID 19 Pneumonia  - at baseline O2 , still has pleuritic chest pain with chronic cough  - s/p RDV/2u plasma/Steroids  - Isolation Precaution     # Left ICA s/p TCAR (transcarotid artery revascularization) 12/04 with TIA last admission  - Continue plavix, pradaxa, ASA  - Code stroke on 12/05 for aphasia - workup was negative for acute stroke;     #Left intra parotid mass   - needs outpt biopsy       # Paroxysmal atrial fibrillation- tachycardic as pt on levo  - On Pradaxa 150mg PO twice daily and ASA 81 mg   - Plavix recently added after a recent TIA last month.     # Chronic respiratory failure 2/2 COPD on home O2  - No increase in basal need of O2, no increased cough, fever, chills, or dyspnea so exacerbation is unlikely.   - Continue home inhalers.  - triple inhaler therapy    # Chronic HFpEF  # HTN  # HLD  - Preserved EF according to Dr. Bishop's notes, although no echo report in [place].   - ECHO (12/2/2020): Hyperdynamic global left ventricular systolic function; EF of 70 %; Moderately increased LV wall thickness. Grade I diastolic dysfunction.  - Continue cholesterol meds  - will hold metoprolol and amlodipine    # Bipolar Disorder  - Mood stable  - Continue home meds Lamictal 100mg PO daily, Seroquel 200mg at bedtime, and Ativan 0.5mg PO twice daily PRN    # GI ppx: protonix  # Diet: DASH/consistent carb  # Activity: IAT (within room)  # Dispo: Home  # Code: FULL    76yo female pmhx of COPD on 5L home oxygen, paroxysmal afib on pradaxa, carotid stenosis (L 80%, R 40%) s/p L carotid stenting recently , HFpEF (EF 70% Dec 2020), HTN, HLD, bipolar disorder ,left parotid mass ,  recently admitted between 12/7-12/11 for COVID PNA s/p steroids/RDV presented to the ED with chest pain       ED Course: initially vitally stable but she became hypotensive to 70/50 s/p 3L bolus >>remained hypotensive was started on low dose levo. Labs: BUN/Cr: 39/1.6, lactate :3, UA + Nitrite LE+ admitted for urosepsis and JOYCE    #UTI with septic shock requiring low dose pressors  - UA + nitrite, LE + SBP<90,lactate: 3 s/p 3L NS in ED  - s/p cefepime and levaquin in ED  - no CVA tenderness    PLAN  fu Ucx, BCx  will c/w cefepime, de escalate or taper abx as per cultures  monitor Bp, taper pressors, c/w IVF  - fu repeat lactate      # Chest pain- likely pleuritic  - most likely pleuritic (Dr. Hoffmann Cardiologist)  - Trops - x1  negative. Not volume overloaded, no CHF exacerbation.   - CXR - Stable bilateral opacities. Low lung volumes with elevated right hemidiaphragm. No pneumothorax.  - ECHO (12/2/2020): Hyperdynamic global left ventricular systolic function; EF of 70 %; Moderately increased LV wall thickness. Grade I diastolic dysfunction.  - Sctress test 12/1/2020: No ischemic EKG changes  - CT chest( 12/2/2020): left upper lobe anterior bulla/focus of paraseptal emphysema now measuring 6.6 x 6.0 cm. No significant left lower lobe consolidation or left pleural effusion. peripheral predominant opacities  - fu trop in am    #JOYCE likely pre renal  --  baseline: 0.8-1.1  --  check ua / urine electrolytes + pr:cr  --  trend bmp / scr  --  ivf: iv ns  --  nephro eval if no improvement    #recent hx COVID 19 Pneumonia  - at baseline O2 , still has pleuritic chest pain with chronic cough  - s/p RDV/2u plasma/Steroids  - Isolation Precaution     # Left ICA s/p TCAR (transcarotid artery revascularization) 12/04 with TIA last admission  - Continue plavix, pradaxa, ASA  - Code stroke on 12/05 for aphasia - workup was negative for acute stroke;     #Left intra parotid mass   - needs outpt biopsy       # Paroxysmal atrial fibrillation- tachycardic as pt on levo  - On Pradaxa 150mg PO twice daily and ASA 81 mg   - Plavix recently added after a recent TIA last month.     # Chronic respiratory failure 2/2 COPD on home O2  - No increase in basal need of O2, no increased cough, fever, chills, or dyspnea so exacerbation is unlikely.   - Continue home inhalers.  - triple inhaler therapy    # Chronic HFpEF  # HTN  # HLD  - Preserved EF according to Dr. Bishop's notes, although no echo report in [place].   - ECHO (12/2/2020): Hyperdynamic global left ventricular systolic function; EF of 70 %; Moderately increased LV wall thickness. Grade I diastolic dysfunction.  - Continue cholesterol meds  - will hold metoprolol and amlodipine    # Bipolar Disorder  - Mood stable  - Continue home meds Lamictal 100mg PO daily, Seroquel 200mg at bedtime, and Ativan 0.5mg PO twice daily PRN    # GI ppx: protonix  # Diet: DASH/consistent carb  # Activity: IAT (within room)  # Dispo: Home  # Code: FULL

## 2020-12-23 NOTE — ED PROVIDER NOTE - CLINICAL SUMMARY MEDICAL DECISION MAKING FREE TEXT BOX
76 yo woman with signs and symptoms concerning for septic shock secondary to pyelonpehritis.  Admitted to stepdown with improved hemodynamics.

## 2020-12-24 LAB
-  COAGULASE NEGATIVE STAPHYLOCOCCUS: SIGNIFICANT CHANGE UP
ALBUMIN SERPL ELPH-MCNC: 3.2 G/DL — LOW (ref 3.5–5.2)
ALP SERPL-CCNC: 94 U/L — SIGNIFICANT CHANGE UP (ref 30–115)
ALT FLD-CCNC: 20 U/L — SIGNIFICANT CHANGE UP (ref 0–41)
ANION GAP SERPL CALC-SCNC: 12 MMOL/L — SIGNIFICANT CHANGE UP (ref 7–14)
AST SERPL-CCNC: 36 U/L — SIGNIFICANT CHANGE UP (ref 0–41)
BILIRUB SERPL-MCNC: 0.8 MG/DL — SIGNIFICANT CHANGE UP (ref 0.2–1.2)
BUN SERPL-MCNC: 18 MG/DL — SIGNIFICANT CHANGE UP (ref 10–20)
CALCIUM SERPL-MCNC: 8.4 MG/DL — LOW (ref 8.5–10.1)
CHLORIDE SERPL-SCNC: 109 MMOL/L — SIGNIFICANT CHANGE UP (ref 98–110)
CO2 SERPL-SCNC: 21 MMOL/L — SIGNIFICANT CHANGE UP (ref 17–32)
CREAT SERPL-MCNC: 0.8 MG/DL — SIGNIFICANT CHANGE UP (ref 0.7–1.5)
CRP SERPL-MCNC: 9.57 MG/DL — HIGH (ref 0–0.4)
CULTURE RESULTS: SIGNIFICANT CHANGE UP
FERRITIN SERPL-MCNC: 910 NG/ML — HIGH (ref 15–150)
GLUCOSE SERPL-MCNC: 128 MG/DL — HIGH (ref 70–99)
GRAM STN FLD: SIGNIFICANT CHANGE UP
HCT VFR BLD CALC: 31.6 % — LOW (ref 37–47)
HGB BLD-MCNC: 10 G/DL — LOW (ref 12–16)
MCHC RBC-ENTMCNC: 31.4 PG — HIGH (ref 27–31)
MCHC RBC-ENTMCNC: 31.6 G/DL — LOW (ref 32–37)
MCV RBC AUTO: 99.4 FL — HIGH (ref 81–99)
METHOD TYPE: SIGNIFICANT CHANGE UP
NRBC # BLD: 0 /100 WBCS — SIGNIFICANT CHANGE UP (ref 0–0)
PLATELET # BLD AUTO: 138 K/UL — SIGNIFICANT CHANGE UP (ref 130–400)
POTASSIUM SERPL-MCNC: 4.2 MMOL/L — SIGNIFICANT CHANGE UP (ref 3.5–5)
POTASSIUM SERPL-SCNC: 4.2 MMOL/L — SIGNIFICANT CHANGE UP (ref 3.5–5)
PROT SERPL-MCNC: 5.8 G/DL — LOW (ref 6–8)
RBC # BLD: 3.18 M/UL — LOW (ref 4.2–5.4)
RBC # FLD: 14.9 % — HIGH (ref 11.5–14.5)
SODIUM SERPL-SCNC: 142 MMOL/L — SIGNIFICANT CHANGE UP (ref 135–146)
SPECIMEN SOURCE: SIGNIFICANT CHANGE UP
SPECIMEN SOURCE: SIGNIFICANT CHANGE UP
WBC # BLD: 4.96 K/UL — SIGNIFICANT CHANGE UP (ref 4.8–10.8)
WBC # FLD AUTO: 4.96 K/UL — SIGNIFICANT CHANGE UP (ref 4.8–10.8)

## 2020-12-24 PROCEDURE — 99233 SBSQ HOSP IP/OBS HIGH 50: CPT | Mod: CS

## 2020-12-24 PROCEDURE — 93970 EXTREMITY STUDY: CPT | Mod: 26

## 2020-12-24 RX ORDER — GUAIFENESIN/DEXTROMETHORPHAN 600MG-30MG
10 TABLET, EXTENDED RELEASE 12 HR ORAL EVERY 6 HOURS
Refills: 0 | Status: DISCONTINUED | OUTPATIENT
Start: 2020-12-24 | End: 2021-01-09

## 2020-12-24 RX ORDER — VANCOMYCIN HCL 1 G
VIAL (EA) INTRAVENOUS
Refills: 0 | Status: DISCONTINUED | OUTPATIENT
Start: 2020-12-24 | End: 2020-12-25

## 2020-12-24 RX ORDER — DILTIAZEM HCL 120 MG
30 CAPSULE, EXT RELEASE 24 HR ORAL ONCE
Refills: 0 | Status: DISCONTINUED | OUTPATIENT
Start: 2020-12-24 | End: 2020-12-24

## 2020-12-24 RX ORDER — SODIUM CHLORIDE 9 MG/ML
1000 INJECTION INTRAMUSCULAR; INTRAVENOUS; SUBCUTANEOUS
Refills: 0 | Status: DISCONTINUED | OUTPATIENT
Start: 2020-12-24 | End: 2020-12-30

## 2020-12-24 RX ADMIN — GABAPENTIN 300 MILLIGRAM(S): 400 CAPSULE ORAL at 21:44

## 2020-12-24 RX ADMIN — DABIGATRAN ETEXILATE MESYLATE 150 MILLIGRAM(S): 150 CAPSULE ORAL at 17:08

## 2020-12-24 RX ADMIN — Medication 81 MILLIGRAM(S): at 11:25

## 2020-12-24 RX ADMIN — TIOTROPIUM BROMIDE 1 CAPSULE(S): 18 CAPSULE ORAL; RESPIRATORY (INHALATION) at 08:11

## 2020-12-24 RX ADMIN — QUETIAPINE FUMARATE 200 MILLIGRAM(S): 200 TABLET, FILM COATED ORAL at 21:44

## 2020-12-24 RX ADMIN — ATORVASTATIN CALCIUM 20 MILLIGRAM(S): 80 TABLET, FILM COATED ORAL at 00:21

## 2020-12-24 RX ADMIN — MONTELUKAST 10 MILLIGRAM(S): 4 TABLET, CHEWABLE ORAL at 11:26

## 2020-12-24 RX ADMIN — BUDESONIDE AND FORMOTEROL FUMARATE DIHYDRATE 2 PUFF(S): 160; 4.5 AEROSOL RESPIRATORY (INHALATION) at 20:00

## 2020-12-24 RX ADMIN — LAMOTRIGINE 100 MILLIGRAM(S): 25 TABLET, ORALLY DISINTEGRATING ORAL at 11:25

## 2020-12-24 RX ADMIN — SODIUM CHLORIDE 50 MILLILITER(S): 9 INJECTION INTRAMUSCULAR; INTRAVENOUS; SUBCUTANEOUS at 09:51

## 2020-12-24 RX ADMIN — Medication 650 MILLIGRAM(S): at 22:05

## 2020-12-24 RX ADMIN — Medication 0.5 MILLIGRAM(S): at 17:10

## 2020-12-24 RX ADMIN — GABAPENTIN 300 MILLIGRAM(S): 400 CAPSULE ORAL at 13:09

## 2020-12-24 RX ADMIN — BUDESONIDE AND FORMOTEROL FUMARATE DIHYDRATE 2 PUFF(S): 160; 4.5 AEROSOL RESPIRATORY (INHALATION) at 08:11

## 2020-12-24 RX ADMIN — GABAPENTIN 300 MILLIGRAM(S): 400 CAPSULE ORAL at 00:20

## 2020-12-24 RX ADMIN — CLOPIDOGREL BISULFATE 75 MILLIGRAM(S): 75 TABLET, FILM COATED ORAL at 11:25

## 2020-12-24 RX ADMIN — ATORVASTATIN CALCIUM 20 MILLIGRAM(S): 80 TABLET, FILM COATED ORAL at 21:44

## 2020-12-24 RX ADMIN — Medication 1 TABLET(S): at 11:25

## 2020-12-24 RX ADMIN — Medication 650 MILLIGRAM(S): at 06:55

## 2020-12-24 RX ADMIN — Medication 10 MILLILITER(S): at 21:44

## 2020-12-24 RX ADMIN — Medication 0.5 MILLIGRAM(S): at 00:27

## 2020-12-24 RX ADMIN — GABAPENTIN 300 MILLIGRAM(S): 400 CAPSULE ORAL at 06:41

## 2020-12-24 RX ADMIN — QUETIAPINE FUMARATE 200 MILLIGRAM(S): 200 TABLET, FILM COATED ORAL at 00:20

## 2020-12-24 RX ADMIN — DABIGATRAN ETEXILATE MESYLATE 150 MILLIGRAM(S): 150 CAPSULE ORAL at 06:41

## 2020-12-24 RX ADMIN — CEFEPIME 100 MILLIGRAM(S): 1 INJECTION, POWDER, FOR SOLUTION INTRAMUSCULAR; INTRAVENOUS at 17:08

## 2020-12-24 RX ADMIN — CEFEPIME 100 MILLIGRAM(S): 1 INJECTION, POWDER, FOR SOLUTION INTRAMUSCULAR; INTRAVENOUS at 06:41

## 2020-12-24 NOTE — CONSULT NOTE ADULT - SUBJECTIVE AND OBJECTIVE BOX
Patient is a 75y old  Female who presents with a chief complaint of chest pain /weakness (23 Dec 2020 01:50)      HPI:  74yo female pmhx of COPD on 5L home oxygen, paroxysmal afib on pradaxa, carotid stenosis (L 80%, R 40%) s/p L carotid stenting recently , HFpEF (EF 70% Dec 2020), HTN, HLD, bipolar disorder ,left parotid mass ,  recently admitted between - for COVID PNA s/p steroids/RDV presented to the ED with chest pain , sharp in character , 8/10 intensity, non radiating, post tussive. Patient mentioned she has chronic non productive cough but yesterday afternoon she had pretty bad cough giving her chest pain. She also mentions she had 1 episode of subjective fever yesterday. Denies nausea/vomiting/chills/diarrhea/dysuria/urgency.       ED Course: initially vitally stable but she became hypotensive to 70/50 s/p 3L bolus >>remained hypotensive was started on low dose levo. Labs: BUN/Cr: 39/1.6, lactate :3, UA + Nitrite LE+ admitted for urosepsis and JOYCE to ED3, events overnight noted fever, blood cx reviewed govind salas      PAST MEDICAL & SURGICAL HISTORY:  Falls    Congestive heart failure    Transient ischemic attack    FLAVIO on CPAP    Bipolar 1 disorder    Allergic reaction    PVD (peripheral vascular disease)    Other emphysema    Other cardiomyopathy    TIA (transient ischemic attack)    COPD (chronic obstructive pulmonary disease)    Depression    Hypertension    History of cholecystectomy        SOCIAL HX:   Smoking  ex    FAMILY HISTORY:  No pertinent family history in first degree relatives        REVIEW OF SYSTEMS uto      Allergies    codeine (Other (Moderate))  Depakote (Unknown)  Dilaudid (Short breath; Rash)  IV Contrast (Anaphylaxis)  losartan (Angioedema)  Risperdal (Other)  verapamil (Short breath; Angioedema)    Intolerances        acetaminophen   Tablet .. 650 milliGRAM(s) Oral every 4 hours PRN  ALBUTerol    90 MICROgram(s) HFA Inhaler 1 Puff(s) Inhalation every 4 hours PRN  aluminum hydroxide/magnesium hydroxide/simethicone Suspension 30 milliLiter(s) Oral every 4 hours PRN  aspirin  chewable 81 milliGRAM(s) Oral daily  atorvastatin 20 milliGRAM(s) Oral at bedtime  budesonide 160 MICROgram(s)/formoterol 4.5 MICROgram(s) Inhaler 2 Puff(s) Inhalation two times a day  cefepime   IVPB 1000 milliGRAM(s) IV Intermittent every 12 hours  clopidogrel Tablet 75 milliGRAM(s) Oral daily  dabigatran 150 milliGRAM(s) Oral every 12 hours  gabapentin 300 milliGRAM(s) Oral three times a day  lamoTRIgine 100 milliGRAM(s) Oral daily  LORazepam     Tablet 0.5 milliGRAM(s) Oral two times a day PRN  montelukast 10 milliGRAM(s) Oral daily  multivitamin 1 Tablet(s) Oral daily  pantoprazole    Tablet 40 milliGRAM(s) Oral before breakfast  QUEtiapine 200 milliGRAM(s) Oral at bedtime  sodium chloride 0.9%. 1000 milliLiter(s) IV Continuous <Continuous>  tiotropium 18 MICROgram(s) Capsule 1 Capsule(s) Inhalation daily  : Home Meds:      PHYSICAL EXAM    ICU Vital Signs Last 24 Hrs  T(C): 37.9 (24 Dec 2020 00:56), Max: 39.4 (23 Dec 2020 18:00)  T(F): 100.3 (24 Dec 2020 00:56), Max: 103 (23 Dec 2020 18:00)  HR: 115 (24 Dec 2020 00:56) (96 - 144)  BP: 138/67 (24 Dec 2020 00:56) (91/61 - 144/67)  BP(mean): 88 (23 Dec 2020 19:01) (71 - 100)  RR: 21 (24 Dec 2020 00:56) (15 - 21)  SpO2: 90% (24 Dec 2020 00:56) (90% - 100%)      General: ILL looking, on NC  HEENT:  MARY JANE              Lymph Nodes: No cervical LN   Lungs: BL Crackles  Cardiovascular: MARCELINO 3/6  Abdomen: Soft, Positive BS  Extremities: No clubbing  Skin: Warm  Neurological: Non focal       20 @ 07:01  -  20 @ 06:55  --------------------------------------------------------  IN:    Norepinephrine: 19.2 mL    Oral Fluid: 50 mL    sodium chloride 0.9%: 375 mL  Total IN: 444.2 mL    OUT:    Indwelling Catheter - Urethral (mL): 1915 mL  Total OUT: 1915 mL    Total NET: -1470.8 mL          LABS:                          10.3   5.63  )-----------( 157      ( 23 Dec 2020 07:33 )             32.7                                               12    139  |  106  |  31<H>  ----------------------------<  97  3.9   |  24  |  1.0    Ca    8.4<L>      23 Dec 2020 07:33  Phos  3.4     12  Mg     1.8         TPro  5.3<L>  /  Alb  3.1<L>  /  TBili  0.5  /  DBili  x   /  AST  43<H>  /  ALT  26  /  AlkPhos  81  1223      PT/INR - ( 23 Dec 2020 07:33 )   PT: 16.20 sec;   INR: 1.41 ratio         PTT - ( 23 Dec 2020 07:33 )  PTT:44.6 sec                                       Urinalysis Basic - ( 23 Dec 2020 00:07 )    Color: Light Yellow / Appearance: Clear / S.012 / pH: x  Gluc: x / Ketone: Negative  / Bili: Negative / Urobili: <2 mg/dL   Blood: x / Protein: Trace / Nitrite: Positive   Leuk Esterase: Moderate / RBC: 3 /HPF / WBC 6 /HPF   Sq Epi: x / Non Sq Epi: 2 /HPF / Bacteria: Negative        CARDIAC MARKERS ( 23 Dec 2020 07:33 )  x     / x     / 26 U/L / x     / x      CARDIAC MARKERS ( 22 Dec 2020 21:56 )  x     / <0.01 ng/mL / x     / x     / x                                                LIVER FUNCTIONS - ( 23 Dec 2020 07:33 )  Alb: 3.1 g/dL / Pro: 5.3 g/dL / ALK PHOS: 81 U/L / ALT: 26 U/L / AST: 43 U/L / GGT: x                                                  Culture - Urine (collected 23 Dec 2020 00:07)  Source: .Urine Clean Catch (Midstream)  Final Report (24 Dec 2020 02:06):    <10,000 CFU/mL Normal Urogenital Beronica    Culture - Blood (collected 22 Dec 2020 21:53)  Source: .Blood Blood-Peripheral  Gram Stain (24 Dec 2020 03:50):    Growth in aerobic bottle: Gram Positive Cocci in Clusters  Preliminary Report (24 Dec 2020 03:51):    Growth in aerobic bottle: Gram Positive Cocci in Clusters    ***Blood Panel PCR results on this specimen are available    approximately 3 hours after the Gram stain result.***    Gram stain, PCR, and/or culture results may not always    correspond due to difference in methodologies.    ************************************************************    This PCR assay was performed using [employer].    The following targets are tested for: Enterococcus,    vancomycin resistant enterococci, Listeria monocytogenes,    coagulase negative staphylococci, S. aureus,    methicillin resistant S. aureus, Streptococcus agalactiae    (Group B), S. pneumoniae, S. pyogenes (Group A),    Acinetobacter baumannii, Enterobacter cloacae, E. coli,    Klebsiella oxytoca, K. pneumoniae, Proteus sp.,    Serratia marcescens, Haemophilus influenzae,    Neisseria meningitidis, Pseudomonas aeruginosa, Candida    albicans, C. glabrata, C krusei, C parapsilosis,    C. tropicalis and the KPC resistance gene.    "Due to technical problems, Proteus sp. and Pseudomonas    aeruginosa will Not be reported as part of the BCID panel    until further notice".  Organism: Blood Culture PCR (24 Dec 2020 06:23)  Organism: Blood Culture PCR (24 Dec 2020 06:23)                                                                                           X-Rays   REVIEWED    MEDICATIONS  (STANDING):  aspirin  chewable 81 milliGRAM(s) Oral daily  atorvastatin 20 milliGRAM(s) Oral at bedtime  budesonide 160 MICROgram(s)/formoterol 4.5 MICROgram(s) Inhaler 2 Puff(s) Inhalation two times a day  cefepime   IVPB 1000 milliGRAM(s) IV Intermittent every 12 hours  clopidogrel Tablet 75 milliGRAM(s) Oral daily  dabigatran 150 milliGRAM(s) Oral every 12 hours  gabapentin 300 milliGRAM(s) Oral three times a day  lamoTRIgine 100 milliGRAM(s) Oral daily  montelukast 10 milliGRAM(s) Oral daily  multivitamin 1 Tablet(s) Oral daily  pantoprazole    Tablet 40 milliGRAM(s) Oral before breakfast  QUEtiapine 200 milliGRAM(s) Oral at bedtime  sodium chloride 0.9%. 1000 milliLiter(s) (75 mL/Hr) IV Continuous <Continuous>  tiotropium 18 MICROgram(s) Capsule 1 Capsule(s) Inhalation daily    MEDICATIONS  (PRN):  acetaminophen   Tablet .. 650 milliGRAM(s) Oral every 4 hours PRN Temp greater or equal to 38.5C (101.3F)  ALBUTerol    90 MICROgram(s) HFA Inhaler 1 Puff(s) Inhalation every 4 hours PRN Shortness of Breath  aluminum hydroxide/magnesium hydroxide/simethicone Suspension 30 milliLiter(s) Oral every 4 hours PRN Dyspepsia  LORazepam     Tablet 0.5 milliGRAM(s) Oral two times a day PRN Anxiety

## 2020-12-24 NOTE — PROGRESS NOTE ADULT - SUBJECTIVE AND OBJECTIVE BOX
SHAUN COATES 75y Female  MRN#: [id number]   Hospital Day: 1d    SUBJECTIVE  Patient is a 75y old Female who presents with a chief complaint of chest pain weakness (24 Dec 2020 06:55)  Currently admitted to medicine with the primary diagnosis of Septic shock      INTERVAL HPI AND OVERNIGHT EVENTS:  Patient was examined and seen at bedside. This morning she is resting comfortably in bed. Patient was febrile to 101F this morning and received tylenol. Denies dysuria, chest pain, sob, abd pain.    OBJECTIVE  PAST MEDICAL & SURGICAL HISTORY  Falls    Congestive heart failure    Transient ischemic attack    FLAVIO on CPAP    Bipolar 1 disorder    Allergic reaction    PVD (peripheral vascular disease)    Other emphysema    Other cardiomyopathy    TIA (transient ischemic attack)    COPD (chronic obstructive pulmonary disease)    Depression    Hypertension    History of cholecystectomy      ALLERGIES:  codeine (Other (Moderate))  Depakote (Unknown)  Dilaudid (Short breath; Rash)  IV Contrast (Anaphylaxis)  losartan (Angioedema)  Risperdal (Other)  verapamil (Short breath; Angioedema)    MEDICATIONS:  STANDING MEDICATIONS  aspirin  chewable 81 milliGRAM(s) Oral daily  atorvastatin 20 milliGRAM(s) Oral at bedtime  budesonide 160 MICROgram(s)/formoterol 4.5 MICROgram(s) Inhaler 2 Puff(s) Inhalation two times a day  cefepime   IVPB 1000 milliGRAM(s) IV Intermittent every 12 hours  clopidogrel Tablet 75 milliGRAM(s) Oral daily  dabigatran 150 milliGRAM(s) Oral every 12 hours  gabapentin 300 milliGRAM(s) Oral three times a day  lamoTRIgine 100 milliGRAM(s) Oral daily  montelukast 10 milliGRAM(s) Oral daily  multivitamin 1 Tablet(s) Oral daily  pantoprazole    Tablet 40 milliGRAM(s) Oral before breakfast  QUEtiapine 200 milliGRAM(s) Oral at bedtime  sodium chloride 0.9%. 1000 milliLiter(s) IV Continuous <Continuous>  tiotropium 18 MICROgram(s) Capsule 1 Capsule(s) Inhalation daily    PRN MEDICATIONS  acetaminophen   Tablet .. 650 milliGRAM(s) Oral every 4 hours PRN  ALBUTerol    90 MICROgram(s) HFA Inhaler 1 Puff(s) Inhalation every 4 hours PRN  aluminum hydroxide/magnesium hydroxide/simethicone Suspension 30 milliLiter(s) Oral every 4 hours PRN  LORazepam     Tablet 0.5 milliGRAM(s) Oral two times a day PRN      VITAL SIGNS: Last 24 Hours  T(C): 38.2 (24 Dec 2020 08:09), Max: 39.4 (23 Dec 2020 18:00)  T(F): 100.7 (24 Dec 2020 08:09), Max: 103 (23 Dec 2020 18:00)  HR: 111 (24 Dec 2020 08:09) (96 - 144)  BP: 119/56 (24 Dec 2020 08:09) (110/76 - 144/67)  BP(mean): 89 (24 Dec 2020 07:00) (76 - 100)  RR: 20 (24 Dec 2020 08:09) (15 - 21)  SpO2: 97% (24 Dec 2020 08:09) (90% - 99%)    LABS:                        10.3   5.63  )-----------( 157      ( 23 Dec 2020 07:33 )             32.7     12-    139  |  106  |  31<H>  ----------------------------<  97  3.9   |  24  |  1.0    Ca    8.4<L>      23 Dec 2020 07:33  Phos  3.4     12-23  Mg     1.8     12-23    TPro  5.3<L>  /  Alb  3.1<L>  /  TBili  0.5  /  DBili  x   /  AST  43<H>  /  ALT  26  /  AlkPhos  81  12-23    PT/INR - ( 23 Dec 2020 07:33 )   PT: 16.20 sec;   INR: 1.41 ratio         PTT - ( 23 Dec 2020 07:33 )  PTT:44.6 sec  Urinalysis Basic - ( 23 Dec 2020 00:07 )    Color: Light Yellow / Appearance: Clear / S.012 / pH: x  Gluc: x / Ketone: Negative  / Bili: Negative / Urobili: <2 mg/dL   Blood: x / Protein: Trace / Nitrite: Positive   Leuk Esterase: Moderate / RBC: 3 /HPF / WBC 6 /HPF   Sq Epi: x / Non Sq Epi: 2 /HPF / Bacteria: Negative            Culture - Urine (collected 23 Dec 2020 00:07)  Source: .Urine Clean Catch (Midstream)  Final Report (24 Dec 2020 02:06):    <10,000 CFU/mL Normal Urogenital Beronica    Culture - Blood (collected 22 Dec 2020 21:53)  Source: .Blood Blood-Peripheral  Gram Stain (24 Dec 2020 03:50):    Growth in aerobic bottle: Gram Positive Cocci in Clusters  Preliminary Report (24 Dec 2020 03:51):    Growth in aerobic bottle: Gram Positive Cocci in Clusters    ***Blood Panel PCR results on this specimen are available    approximately 3 hours after the Gram stain result.***    Gram stain, PCR, and/or culture results may not always    correspond due to difference in methodologies.    ************************************************************    This PCR assay was performed using [employer].    The following targets are tested for: Enterococcus,    vancomycin resistant enterococci, Listeria monocytogenes,    coagulase negative staphylococci, S. aureus,    methicillin resistant S. aureus, Streptococcus agalactiae    (Group B), S. pneumoniae, S. pyogenes (Group A),    Acinetobacter baumannii, Enterobacter cloacae, E. coli,    Klebsiella oxytoca, K. pneumoniae, Proteus sp.,    Serratia marcescens, Haemophilus influenzae,    Neisseria meningitidis, Pseudomonas aeruginosa, Candida    albicans, C. glabrata, C krusei, C parapsilosis,    C. tropicalis and the KPC resistance gene.    "Due to technical problems, Proteus sp. and Pseudomonas    aeruginosa will Not be reported as part of the BCID panel    until further notice".  Organism: Blood Culture PCR (24 Dec 2020 06:23)  Organism: Blood Culture PCR (24 Dec 2020 06:23)    Culture - Blood (collected 22 Dec 2020 21:53)  Source: .Blood Blood-Peripheral  Preliminary Report (24 Dec 2020 07:01):    No growth to date.      CARDIAC MARKERS ( 23 Dec 2020 07:33 )  x     / x     / 26 U/L / x     / x      CARDIAC MARKERS ( 22 Dec 2020 21:56 )  x     / <0.01 ng/mL / x     / x     / x          PHYSICAL EXAM:  GENERAL: NAD, AAO x 4  HEAD:  Atraumatic, Normocephalic  EYES: EOMI, conjunctiva and sclera white  NECK: Supple, No JVD  CHEST/LUNG: Clear to auscultation bilaterally; No wheeze; No crackles; No accessory muscles used  HEART: tachycardic, regular rhythm; No murmurs  ABDOMEN: Soft, Nontender, Nondistended; no guarding, No organomegaly  EXTREMITIES:  2+ Peripheral Pulses, No cyanosis or edema  NEUROLOGY: non-focal    ASSESSMENT & PLAN:    76yo female pmhx of COPD on 5L home oxygen, paroxysmal afib on pradaxa, carotid stenosis (L 80%, R 40%) s/p L carotid stenting recently , HFpEF (EF 70% Dec 2020), HTN, HLD, bipolar disorder, left parotid mass, recently admitted between - for COVID PNA s/p steroids/RDV presented to the ED with chest pain.     ED Course: initially vitally stable but she became hypotensive to 70/50 s/p 3L bolus >>remained hypotensive was started on low dose levo. Labs: BUN/Cr: 39/1.6, lactate :3, UA + Nitrite LE+ admitted for urosepsis and JOYCE    #UTI with septic shock  - hypotensive on admission s/p 3L bolus in ED, required low dose pressors  - BP stable today () - SBP 120s  - UA + nitrite, LE + SBP<90,lactate: 3 s/p 3L NS in ED  - s/p cefepime and levaquin in ED  - no CVA tenderness  - elevated lactate resolved (3.0 --> 1.4 on )  - BCx/UCx : positive for gram positive coagulase negative Staph  PLAN  >c/w cefepime    # Chronic respiratory failure 2/2 COPD on home O2  - No increase in basal need of O2, no increased cough, fever, chills, or dyspnea so exacerbation is unlikely.   - Continue home inhalers.  - triple inhaler therapy  - remains stable satting 97% on 5L NC  PLAN  > continue O2    # Chest pain- likely pleuritic  - most likely pleuritic (Dr. Hoffmann Cardiologist)  - Trops - x1  negative. Not volume overloaded, no CHF exacerbation.   - CXR - Stable bilateral opacities. Low lung volumes with elevated right hemidiaphragm. No pneumothorax.  - ECHO (2020): Hyperdynamic global left ventricular systolic function; EF of 70 %; Moderately increased LV wall thickness. Grade I diastolic dysfunction.  - Sctress test 2020: No ischemic EKG changes  - CT chest( 2020): left upper lobe anterior bulla/focus of paraseptal emphysema now measuring 6.6 x 6.0 cm. No significant left lower lobe consolidation or left pleural effusion. peripheral predominant opacities  PLAN  > f/u trop and CKMB ()    #JOYCE likely pre renal - resolved  --  baseline: 0.8-1.1  - admission Cr 1.6 --> 1.0 ()    #recent hx COVID 19 Pneumonia  - at baseline O2 , still has pleuritic chest pain with chronic cough  - s/p RDV/2u plasma/Steroids  - Isolation Precaution     # Left ICA s/p TCAR (transcarotid artery revascularization)  with TIA last admission  - Continue plavix, pradaxa, ASA  - Code stroke on  for aphasia - workup was negative for acute stroke    #Left intra parotid mass   - needs outpt biopsy     # Paroxysmal atrial fibrillation- tachycardic as pt on levo  - On Pradaxa 150mg PO twice daily and ASA 81 mg   - Plavix recently added after a recent TIA last month.     # Chronic HFpEF  # HTN  # HLD  - Preserved EF according to Dr. Bishop's notes, although no echo report in [place].   - ECHO (2020): Hyperdynamic global left ventricular systolic function; EF of 70 %; Moderately increased LV wall thickness. Grade I diastolic dysfunction.  - Continue cholesterol meds  - will hold metoprolol and amlodipine    # Bipolar Disorder  - Mood stable  - Continue home meds Lamictal 100mg PO daily, Seroquel 200mg at bedtime, and Ativan 0.5mg PO twice daily PRN    # GI ppx: protonix  # Diet: DASH/consistent carb  # Activity: IAT (within room)  # Dispo: Home  # Code: FULL        SHAUN COATES 75y Female  MRN#: [id number]   Hospital Day: 1d    SUBJECTIVE  Patient is a 75y old Female who presents with a chief complaint of chest pain weakness (24 Dec 2020 06:55)  Currently admitted to medicine with the primary diagnosis of Septic shock      INTERVAL HPI AND OVERNIGHT EVENTS:  Patient was examined and seen at bedside. This morning she is resting comfortably in bed. Patient was febrile to 101F this morning and received tylenol. Denies dysuria, chest pain, sob, abd pain.    OBJECTIVE  PAST MEDICAL & SURGICAL HISTORY  Falls    Congestive heart failure    Transient ischemic attack    FLAVIO on CPAP    Bipolar 1 disorder    Allergic reaction    PVD (peripheral vascular disease)    Other emphysema    Other cardiomyopathy    TIA (transient ischemic attack)    COPD (chronic obstructive pulmonary disease)    Depression    Hypertension    History of cholecystectomy      ALLERGIES:  codeine (Other (Moderate))  Depakote (Unknown)  Dilaudid (Short breath; Rash)  IV Contrast (Anaphylaxis)  losartan (Angioedema)  Risperdal (Other)  verapamil (Short breath; Angioedema)    MEDICATIONS:  STANDING MEDICATIONS  aspirin  chewable 81 milliGRAM(s) Oral daily  atorvastatin 20 milliGRAM(s) Oral at bedtime  budesonide 160 MICROgram(s)/formoterol 4.5 MICROgram(s) Inhaler 2 Puff(s) Inhalation two times a day  cefepime   IVPB 1000 milliGRAM(s) IV Intermittent every 12 hours  clopidogrel Tablet 75 milliGRAM(s) Oral daily  dabigatran 150 milliGRAM(s) Oral every 12 hours  gabapentin 300 milliGRAM(s) Oral three times a day  lamoTRIgine 100 milliGRAM(s) Oral daily  montelukast 10 milliGRAM(s) Oral daily  multivitamin 1 Tablet(s) Oral daily  pantoprazole    Tablet 40 milliGRAM(s) Oral before breakfast  QUEtiapine 200 milliGRAM(s) Oral at bedtime  sodium chloride 0.9%. 1000 milliLiter(s) IV Continuous <Continuous>  tiotropium 18 MICROgram(s) Capsule 1 Capsule(s) Inhalation daily    PRN MEDICATIONS  acetaminophen   Tablet .. 650 milliGRAM(s) Oral every 4 hours PRN  ALBUTerol    90 MICROgram(s) HFA Inhaler 1 Puff(s) Inhalation every 4 hours PRN  aluminum hydroxide/magnesium hydroxide/simethicone Suspension 30 milliLiter(s) Oral every 4 hours PRN  LORazepam     Tablet 0.5 milliGRAM(s) Oral two times a day PRN      VITAL SIGNS: Last 24 Hours  T(C): 38.2 (24 Dec 2020 08:09), Max: 39.4 (23 Dec 2020 18:00)  T(F): 100.7 (24 Dec 2020 08:09), Max: 103 (23 Dec 2020 18:00)  HR: 111 (24 Dec 2020 08:09) (96 - 144)  BP: 119/56 (24 Dec 2020 08:09) (110/76 - 144/67)  BP(mean): 89 (24 Dec 2020 07:00) (76 - 100)  RR: 20 (24 Dec 2020 08:09) (15 - 21)  SpO2: 97% (24 Dec 2020 08:09) (90% - 99%)    LABS:                        10.3   5.63  )-----------( 157      ( 23 Dec 2020 07:33 )             32.7     12-    139  |  106  |  31<H>  ----------------------------<  97  3.9   |  24  |  1.0    Ca    8.4<L>      23 Dec 2020 07:33  Phos  3.4     12-23  Mg     1.8     12-23    TPro  5.3<L>  /  Alb  3.1<L>  /  TBili  0.5  /  DBili  x   /  AST  43<H>  /  ALT  26  /  AlkPhos  81  12-23    PT/INR - ( 23 Dec 2020 07:33 )   PT: 16.20 sec;   INR: 1.41 ratio         PTT - ( 23 Dec 2020 07:33 )  PTT:44.6 sec  Urinalysis Basic - ( 23 Dec 2020 00:07 )    Color: Light Yellow / Appearance: Clear / S.012 / pH: x  Gluc: x / Ketone: Negative  / Bili: Negative / Urobili: <2 mg/dL   Blood: x / Protein: Trace / Nitrite: Positive   Leuk Esterase: Moderate / RBC: 3 /HPF / WBC 6 /HPF   Sq Epi: x / Non Sq Epi: 2 /HPF / Bacteria: Negative            Culture - Urine (collected 23 Dec 2020 00:07)  Source: .Urine Clean Catch (Midstream)  Final Report (24 Dec 2020 02:06):    <10,000 CFU/mL Normal Urogenital Beronica    Culture - Blood (collected 22 Dec 2020 21:53)  Source: .Blood Blood-Peripheral  Gram Stain (24 Dec 2020 03:50):    Growth in aerobic bottle: Gram Positive Cocci in Clusters  Preliminary Report (24 Dec 2020 03:51):    Growth in aerobic bottle: Gram Positive Cocci in Clusters    ***Blood Panel PCR results on this specimen are available    approximately 3 hours after the Gram stain result.***    Gram stain, PCR, and/or culture results may not always    correspond due to difference in methodologies.    ************************************************************    This PCR assay was performed using [employer].    The following targets are tested for: Enterococcus,    vancomycin resistant enterococci, Listeria monocytogenes,    coagulase negative staphylococci, S. aureus,    methicillin resistant S. aureus, Streptococcus agalactiae    (Group B), S. pneumoniae, S. pyogenes (Group A),    Acinetobacter baumannii, Enterobacter cloacae, E. coli,    Klebsiella oxytoca, K. pneumoniae, Proteus sp.,    Serratia marcescens, Haemophilus influenzae,    Neisseria meningitidis, Pseudomonas aeruginosa, Candida    albicans, C. glabrata, C krusei, C parapsilosis,    C. tropicalis and the KPC resistance gene.    "Due to technical problems, Proteus sp. and Pseudomonas    aeruginosa will Not be reported as part of the BCID panel    until further notice".  Organism: Blood Culture PCR (24 Dec 2020 06:23)  Organism: Blood Culture PCR (24 Dec 2020 06:23)    Culture - Blood (collected 22 Dec 2020 21:53)  Source: .Blood Blood-Peripheral  Preliminary Report (24 Dec 2020 07:01):    No growth to date.      CARDIAC MARKERS ( 23 Dec 2020 07:33 )  x     / x     / 26 U/L / x     / x      CARDIAC MARKERS ( 22 Dec 2020 21:56 )  x     / <0.01 ng/mL / x     / x     / x          PHYSICAL EXAM:  GENERAL: NAD, AAO x 4  HEAD:  Atraumatic, Normocephalic  EYES: EOMI, conjunctiva and sclera white  NECK: Supple, No JVD  CHEST/LUNG: Clear to auscultation bilaterally; No wheeze; No crackles; No accessory muscles used  HEART: tachycardic, regular rhythm; No murmurs  ABDOMEN: Soft, Nontender, Nondistended; no guarding, No organomegaly  EXTREMITIES:  2+ Peripheral Pulses, No cyanosis or edema  NEUROLOGY: non-focal    ASSESSMENT & PLAN:    74yo female pmhx of COPD on 5L home oxygen, paroxysmal afib on pradaxa, carotid stenosis (L 80%, R 40%) s/p L carotid stenting recently , HFpEF (EF 70% Dec 2020), HTN, HLD, bipolar disorder, left parotid mass, recently admitted between - for COVID PNA s/p steroids/RDV presented to the ED with chest pain.     ED Course: initially vitally stable but she became hypotensive to 70/50 s/p 3L bolus >>remained hypotensive was started on low dose levo. Labs: BUN/Cr: 39/1.6, lactate :3, UA + Nitrite LE+ admitted for urosepsis and JOYCE    #UTI with septic shock  - hypotensive on admission s/p 3L bolus in ED, required low dose pressors  - BP stable today () - SBP 120s  - UA + nitrite, LE + SBP<90,lactate: 3 s/p 3L NS in ED  - s/p cefepime and levaquin in ED  - no CVA tenderness  - elevated lactate resolved (3.0 --> 1.4 on )  - BCx/UCx : positive for gram positive coagulase negative Staph  PLAN  >c/w cefepime  > tylenol prn for fever    # Chronic respiratory failure 2/2 COPD on home O2  - No increase in basal need of O2, no increased cough, fever, chills, or dyspnea so exacerbation is unlikely.   - Continue home inhalers.  - triple inhaler therapy  - remains stable satting 97% on 5L NC  PLAN  > continue O2    # Chest pain- likely pleuritic  - most likely pleuritic (Dr. Hoffmann Cardiologist)  - Trops - x1  negative. Not volume overloaded, no CHF exacerbation.   - CXR - Stable bilateral opacities. Low lung volumes with elevated right hemidiaphragm. No pneumothorax.  - ECHO (2020): Hyperdynamic global left ventricular systolic function; EF of 70 %; Moderately increased LV wall thickness. Grade I diastolic dysfunction.  - Sctress test 2020: No ischemic EKG changes  - CT chest( 2020): left upper lobe anterior bulla/focus of paraseptal emphysema now measuring 6.6 x 6.0 cm. No significant left lower lobe consolidation or left pleural effusion. peripheral predominant opacities      #JOYCE likely pre renal - resolved  --  baseline: 0.8-1.1  - admission Cr 1.6 --> 1.0 ()    #recent hx COVID 19 Pneumonia  - at baseline O2 , still has pleuritic chest pain with chronic cough  - s/p RDV/2u plasma/Steroids  - Isolation Precaution     # Left ICA s/p TCAR (transcarotid artery revascularization)  with TIA last admission  - Continue plavix, pradaxa, ASA  - Code stroke on  for aphasia - workup was negative for acute stroke    #Left intra parotid mass   - needs outpt biopsy     # Paroxysmal atrial fibrillation- tachycardic as pt on levo  - On Pradaxa 150mg PO twice daily and ASA 81 mg   - Plavix recently added after a recent TIA last month.     # Chronic HFpEF  # HTN  # HLD  - Preserved EF according to Dr. Bishop's notes, although no echo report in [place].   - ECHO (2020): Hyperdynamic global left ventricular systolic function; EF of 70 %; Moderately increased LV wall thickness. Grade I diastolic dysfunction.  - Continue cholesterol meds  - will hold metoprolol and amlodipine    # Bipolar Disorder  - Mood stable  - Continue home meds Lamictal 100mg PO daily, Seroquel 200mg at bedtime, and Ativan 0.5mg PO twice daily PRN    # GI ppx: protonix  # Diet: DASH/consistent carb  # Activity: IAT (within room)  # Dispo: Home  # Code: FULL        SHAUN COATES 75y Female  MRN#: [id number]   Hospital Day: 1d    SUBJECTIVE  Patient is a 75y old Female who presents with a chief complaint of chest pain weakness  Currently admitted to medicine with the primary diagnosis of hypotension , possibly Septic shock, now off pressors      INTERVAL HPI AND OVERNIGHT EVENTS:  Patient was examined and seen at bedside. This morning she is resting comfortably in bed. Patient was febrile to 101F this morning and received tylenol. Denies dysuria, chest pain, sob, abd pain.    OBJECTIVE  PAST MEDICAL & SURGICAL HISTORY  Falls    Congestive heart failure    Transient ischemic attack    FLAVIO on CPAP    Bipolar 1 disorder    Allergic reaction    PVD (peripheral vascular disease)    Other emphysema    Other cardiomyopathy    TIA (transient ischemic attack)    COPD (chronic obstructive pulmonary disease)    Depression    Hypertension    History of cholecystectomy      ALLERGIES:  codeine (Other (Moderate))  Depakote (Unknown)  Dilaudid (Short breath; Rash)  IV Contrast (Anaphylaxis)  losartan (Angioedema)  Risperdal (Other)  verapamil (Short breath; Angioedema)    MEDICATIONS:  STANDING MEDICATIONS  aspirin  chewable 81 milliGRAM(s) Oral daily  atorvastatin 20 milliGRAM(s) Oral at bedtime  budesonide 160 MICROgram(s)/formoterol 4.5 MICROgram(s) Inhaler 2 Puff(s) Inhalation two times a day  cefepime   IVPB 1000 milliGRAM(s) IV Intermittent every 12 hours  clopidogrel Tablet 75 milliGRAM(s) Oral daily  dabigatran 150 milliGRAM(s) Oral every 12 hours  gabapentin 300 milliGRAM(s) Oral three times a day  lamoTRIgine 100 milliGRAM(s) Oral daily  montelukast 10 milliGRAM(s) Oral daily  multivitamin 1 Tablet(s) Oral daily  pantoprazole    Tablet 40 milliGRAM(s) Oral before breakfast  QUEtiapine 200 milliGRAM(s) Oral at bedtime  sodium chloride 0.9%. 1000 milliLiter(s) IV Continuous <Continuous>  tiotropium 18 MICROgram(s) Capsule 1 Capsule(s) Inhalation daily    PRN MEDICATIONS  acetaminophen   Tablet .. 650 milliGRAM(s) Oral every 4 hours PRN  ALBUTerol    90 MICROgram(s) HFA Inhaler 1 Puff(s) Inhalation every 4 hours PRN  aluminum hydroxide/magnesium hydroxide/simethicone Suspension 30 milliLiter(s) Oral every 4 hours PRN  LORazepam     Tablet 0.5 milliGRAM(s) Oral two times a day PRN      VITAL SIGNS: Last 24 Hours  T(C): 38.2 (24 Dec 2020 08:09), Max: 39.4 (23 Dec 2020 18:00)  T(F): 100.7 (24 Dec 2020 08:09), Max: 103 (23 Dec 2020 18:00)  HR: 111 (24 Dec 2020 08:09) (96 - 144)  BP: 119/56 (24 Dec 2020 08:09) (110/76 - 144/67)  BP(mean): 89 (24 Dec 2020 07:00) (76 - 100)  RR: 20 (24 Dec 2020 08:09) (15 - 21)  SpO2: 97% (24 Dec 2020 08:09) (90% - 99%)    LABS:                        10.3   5.63  )-----------( 157      ( 23 Dec 2020 07:33 )             32.7     12-    139  |  106  |  31<H>  ----------------------------<  97  3.9   |  24  |  1.0    Ca    8.4<L>      23 Dec 2020 07:33  Phos  3.4     12-23  Mg     1.8     12-23    TPro  5.3<L>  /  Alb  3.1<L>  /  TBili  0.5  /  DBili  x   /  AST  43<H>  /  ALT  26  /  AlkPhos  81  12-23    PT/INR - ( 23 Dec 2020 07:33 )   PT: 16.20 sec;   INR: 1.41 ratio         PTT - ( 23 Dec 2020 07:33 )  PTT:44.6 sec  Urinalysis Basic - ( 23 Dec 2020 00:07 )    Color: Light Yellow / Appearance: Clear / S.012 / pH: x  Gluc: x / Ketone: Negative  / Bili: Negative / Urobili: <2 mg/dL   Blood: x / Protein: Trace / Nitrite: Positive   Leuk Esterase: Moderate / RBC: 3 /HPF / WBC 6 /HPF   Sq Epi: x / Non Sq Epi: 2 /HPF / Bacteria: Negative            Culture - Urine (collected 23 Dec 2020 00:07)  Source: .Urine Clean Catch (Midstream)  Final Report (24 Dec 2020 02:06):    <10,000 CFU/mL Normal Urogenital Beronica    Culture - Blood (collected 22 Dec 2020 21:53)  Source: .Blood Blood-Peripheral  Gram Stain (24 Dec 2020 03:50):    Growth in aerobic bottle: Gram Positive Cocci in Clusters  Preliminary Report (24 Dec 2020 03:51):    Growth in aerobic bottle: Gram Positive Cocci in Clusters    ***Blood Panel PCR results on this specimen are available    approximately 3 hours after the Gram stain result.***    Gram stain, PCR, and/or culture results may not always    correspond due to difference in methodologies.    ************************************************************    This PCR assay was performed using [employer].    The following targets are tested for: Enterococcus,    vancomycin resistant enterococci, Listeria monocytogenes,    coagulase negative staphylococci, S. aureus,    methicillin resistant S. aureus, Streptococcus agalactiae    (Group B), S. pneumoniae, S. pyogenes (Group A),    Acinetobacter baumannii, Enterobacter cloacae, E. coli,    Klebsiella oxytoca, K. pneumoniae, Proteus sp.,    Serratia marcescens, Haemophilus influenzae,    Neisseria meningitidis, Pseudomonas aeruginosa, Candida    albicans, C. glabrata, C krusei, C parapsilosis,    C. tropicalis and the KPC resistance gene.    "Due to technical problems, Proteus sp. and Pseudomonas    aeruginosa will Not be reported as part of the BCID panel    until further notice".  Organism: Blood Culture PCR (24 Dec 2020 06:23)  Organism: Blood Culture PCR (24 Dec 2020 06:23)    Culture - Blood (collected 22 Dec 2020 21:53)  Source: .Blood Blood-Peripheral  Preliminary Report (24 Dec 2020 07:01):    No growth to date.      CARDIAC MARKERS ( 23 Dec 2020 07:33 )  x     / x     / 26 U/L / x     / x      CARDIAC MARKERS ( 22 Dec 2020 21:56 )  x     / <0.01 ng/mL / x     / x     / x          PHYSICAL EXAM:  GENERAL: NAD, AAO x 4  HEAD:  Atraumatic, Normocephalic  EYES: EOMI, conjunctiva and sclera white  NECK: Supple, No JVD  CHEST/LUNG: Clear to auscultation bilaterally; No wheeze; No crackles; No accessory muscles used  HEART: tachycardic, regular rhythm; No murmurs  ABDOMEN: Soft, Nontender, Nondistended; no guarding, No organomegaly  EXTREMITIES:  2+ Peripheral Pulses, No cyanosis or edema  NEUROLOGY: non-focal    ASSESSMENT & PLAN:    76yo female pmhx of COPD on 5L home oxygen, paroxysmal afib on pradaxa, carotid stenosis (L 80%, R 40%) s/p L carotid stenting recently , HFpEF (EF 70% Dec 2020), HTN, HLD, bipolar disorder, left parotid mass, recently admitted between - for COVID PNA s/p steroids/RDV presented to the ED with chest pain.     ED Course: initially vitally stable but she became hypotensive to 70/50 s/p 3L bolus >>remained hypotensive was started on low dose levo. Labs: BUN/Cr: 39/1.6, lactate :3, UA + Nitrite LE+ admitted for urosepsis and JOYCE    #UTI with septic shock  - hypotensive on admission s/p 3L bolus in ED, required low dose pressors  - BP stable today () - SBP 120s  - UA + nitrite, LE + SBP<90,lactate: 3 s/p 3L NS in ED  - s/p cefepime and levaquin in ED  - no CVA tenderness  - elevated lactate resolved (3.0 --> 1.4 on )  - BCx/UCx : positive for gram positive coagulase negative Staph, possibly contamination  PLAN  >c/w cefepime  > tylenol prn for fever    # Chronic respiratory failure 2/2 COPD on home O2  - No increase in basal need of O2, no increased cough, fever, chills, or dyspnea so exacerbation is unlikely.   - Continue home inhalers.  - triple inhaler therapy  - remains stable satting 97% on 5L NC  PLAN  > continue O2    # Chest pain- likely pleuritic  - most likely pleuritic (Dr. Hoffmann Cardiologist)  - Trops - x1  negative. Not volume overloaded, no CHF exacerbation.   - CXR - Stable bilateral opacities. Low lung volumes with elevated right hemidiaphragm. No pneumothorax.  - ECHO (2020): Hyperdynamic global left ventricular systolic function; EF of 70 %; Moderately increased LV wall thickness. Grade I diastolic dysfunction.  - Sctress test 2020: No ischemic EKG changes  - CT chest( 2020): left upper lobe anterior bulla/focus of paraseptal emphysema now measuring 6.6 x 6.0 cm. No significant left lower lobe consolidation or left pleural effusion. peripheral predominant opacities      #JOYCE likely pre renal - resolved  --  baseline: 0.8-1.1  - admission Cr 1.6 --> 1.0 ()    #recent hx COVID 19 Pneumonia  - at baseline O2 , still has pleuritic chest pain with chronic cough  - s/p RDV/2u plasma/Steroids  - Isolation Precaution     # Left ICA s/p TCAR (transcarotid artery revascularization)  with TIA last admission  - Continue plavix, pradaxa, ASA  - Code stroke on  for aphasia - workup was negative for acute stroke    #Left intra parotid mass   - needs outpt biopsy     # Paroxysmal atrial fibrillation- tachycardic as pt on levo  - On Pradaxa 150mg PO twice daily and ASA 81 mg   - Plavix recently added after a recent TIA last month.     # Chronic HFpEF  # HTN  # HLD  - Preserved EF according to Dr. Bishop's notes, although no echo report in [place].   - ECHO (2020): Hyperdynamic global left ventricular systolic function; EF of 70 %; Moderately increased LV wall thickness. Grade I diastolic dysfunction.  - Continue cholesterol meds  - will hold metoprolol and amlodipine    # Bipolar Disorder  - Mood stable  - Continue home meds Lamictal 100mg PO daily, Seroquel 200mg at bedtime, and Ativan 0.5mg PO twice daily PRN    # GI ppx: protonix  # Diet: DASH/consistent carb  # Activity: IAT (within room)  # Dispo: Home  # Code: FULL

## 2020-12-24 NOTE — CONSULT NOTE ADULT - ASSESSMENT
IMPRESSION:    Sepsis present on admission  UTI/ prior E Coli in urine  Covid pneumonia  P A fib  COPD on home oxygen        PLAN:    CNS: Avoid CNS depressant    HEENT:  Oral care    PULMONARY:  HOB @ 45 degrees, BIPAP at night, NC during day keep Sao2 92 to 96%, solumedrol 40 q 12h    CARDIOVASCULAR: taper pressors    GI: GI prophylaxis                                          Feeding po    RENAL:  F/u  lytes.  Correct as needed. accurate I/O    INFECTIOUS DISEASE: f/up cx, cefepime till cx, f/up infl markers    HEMATOLOGICAL:  DVT prophylaxis. LE doppler    ENDOCRINE:  Follow up FS.  Insulin protocol if needed.    MUSCULOSKELETAL: bedrest    CODE STATUS: FULL CODE    DISPOSITION: ED3  poor prognosis  advance directives

## 2020-12-24 NOTE — CONSULT NOTE ADULT - ASSESSMENT
ASSESSMENT  75y F with COPD on 5L home O2, paroxysmal afib, carotid stenosis, HFpEF, HTN, HLD, Bipolar disorder, recent admission D/C 12/11 s/p left TCAR, right groin access  found to be COVID-19 PNA s/p RDV, plasma now admitted with CP     IMPRESSION  #Severe COVID-19 PNA with septic shock requiring pressors     Procalcitonin, Serum: 0.84 ng/mL (12-22-20 @ 21:56)    CXR bilateral opacities , RLL opacity    UA unremarkable UCX NG    12/22 BCX coNS 1/4 bottle suspect contaminant     COVID-19 PCR: Detected (12-07-20 @ 12:30)  C-Reactive Protein, Serum: 9.57 mg/dL (12-23-20 @ 07:33)  D-Dimer Assay, Quantitative: 257 ng/mL DDU (12-23-20 @ 07:33) D-Dimer Assay, Quantitative: 301 ng/mL DDU (12-22-20 @ 21:56)  Ferritin, Serum: 910 ng/mL (12-23-20 @ 07:33)    #JOYCE  #Lactic acidosis  #Obesity BMI (kg/m2): 32.3, 34      RECOMMENDATIONS  - cefepime   IVPB 1000 milliGRAM(s) IV Intermittent every 12 hours  - sputum cx if possible  - repeat BCX, suspect contaminant   - repeat inflammatory markers 12/25  - check MRSA nares    If any questions, please call or send a message on [employer] Teams  Spectra [phone]

## 2020-12-25 LAB
ALBUMIN SERPL ELPH-MCNC: 2.9 G/DL — LOW (ref 3.5–5.2)
ALP SERPL-CCNC: 84 U/L — SIGNIFICANT CHANGE UP (ref 30–115)
ALT FLD-CCNC: 16 U/L — SIGNIFICANT CHANGE UP (ref 0–41)
ANION GAP SERPL CALC-SCNC: 9 MMOL/L — SIGNIFICANT CHANGE UP (ref 7–14)
AST SERPL-CCNC: 30 U/L — SIGNIFICANT CHANGE UP (ref 0–41)
BASOPHILS # BLD AUTO: 0.01 K/UL — SIGNIFICANT CHANGE UP (ref 0–0.2)
BASOPHILS NFR BLD AUTO: 0.2 % — SIGNIFICANT CHANGE UP (ref 0–1)
BILIRUB SERPL-MCNC: 0.9 MG/DL — SIGNIFICANT CHANGE UP (ref 0.2–1.2)
BUN SERPL-MCNC: 19 MG/DL — SIGNIFICANT CHANGE UP (ref 10–20)
CALCIUM SERPL-MCNC: 8.2 MG/DL — LOW (ref 8.5–10.1)
CHLORIDE SERPL-SCNC: 109 MMOL/L — SIGNIFICANT CHANGE UP (ref 98–110)
CO2 SERPL-SCNC: 21 MMOL/L — SIGNIFICANT CHANGE UP (ref 17–32)
CREAT SERPL-MCNC: 0.9 MG/DL — SIGNIFICANT CHANGE UP (ref 0.7–1.5)
CULTURE RESULTS: SIGNIFICANT CHANGE UP
EOSINOPHIL # BLD AUTO: 0.17 K/UL — SIGNIFICANT CHANGE UP (ref 0–0.7)
EOSINOPHIL NFR BLD AUTO: 3.5 % — SIGNIFICANT CHANGE UP (ref 0–8)
GLUCOSE SERPL-MCNC: 117 MG/DL — HIGH (ref 70–99)
HCT VFR BLD CALC: 30.1 % — LOW (ref 37–47)
HGB BLD-MCNC: 9.3 G/DL — LOW (ref 12–16)
IMM GRANULOCYTES NFR BLD AUTO: 0.4 % — HIGH (ref 0.1–0.3)
LYMPHOCYTES # BLD AUTO: 0.75 K/UL — LOW (ref 1.2–3.4)
LYMPHOCYTES # BLD AUTO: 15.2 % — LOW (ref 20.5–51.1)
MAGNESIUM SERPL-MCNC: 2 MG/DL — SIGNIFICANT CHANGE UP (ref 1.8–2.4)
MCHC RBC-ENTMCNC: 30.9 G/DL — LOW (ref 32–37)
MCHC RBC-ENTMCNC: 31.1 PG — HIGH (ref 27–31)
MCV RBC AUTO: 100.7 FL — HIGH (ref 81–99)
MONOCYTES # BLD AUTO: 0.19 K/UL — SIGNIFICANT CHANGE UP (ref 0.1–0.6)
MONOCYTES NFR BLD AUTO: 3.9 % — SIGNIFICANT CHANGE UP (ref 1.7–9.3)
NEUTROPHILS # BLD AUTO: 3.78 K/UL — SIGNIFICANT CHANGE UP (ref 1.4–6.5)
NEUTROPHILS NFR BLD AUTO: 76.8 % — HIGH (ref 42.2–75.2)
NRBC # BLD: 0 /100 WBCS — SIGNIFICANT CHANGE UP (ref 0–0)
ORGANISM # SPEC MICROSCOPIC CNT: SIGNIFICANT CHANGE UP
ORGANISM # SPEC MICROSCOPIC CNT: SIGNIFICANT CHANGE UP
PLATELET # BLD AUTO: 130 K/UL — SIGNIFICANT CHANGE UP (ref 130–400)
POTASSIUM SERPL-MCNC: 4.7 MMOL/L — SIGNIFICANT CHANGE UP (ref 3.5–5)
POTASSIUM SERPL-SCNC: 4.7 MMOL/L — SIGNIFICANT CHANGE UP (ref 3.5–5)
PROCALCITONIN SERPL-MCNC: 0.31 NG/ML — HIGH (ref 0.02–0.1)
PROCALCITONIN SERPL-MCNC: 1.09 NG/ML — HIGH (ref 0.02–0.1)
PROT SERPL-MCNC: 5.1 G/DL — LOW (ref 6–8)
RBC # BLD: 2.99 M/UL — LOW (ref 4.2–5.4)
RBC # FLD: 15 % — HIGH (ref 11.5–14.5)
SODIUM SERPL-SCNC: 139 MMOL/L — SIGNIFICANT CHANGE UP (ref 135–146)
SPECIMEN SOURCE: SIGNIFICANT CHANGE UP
TROPONIN T SERPL-MCNC: 0.02 NG/ML — HIGH
WBC # BLD: 4.92 K/UL — SIGNIFICANT CHANGE UP (ref 4.8–10.8)
WBC # FLD AUTO: 4.92 K/UL — SIGNIFICANT CHANGE UP (ref 4.8–10.8)

## 2020-12-25 PROCEDURE — 71045 X-RAY EXAM CHEST 1 VIEW: CPT | Mod: 26

## 2020-12-25 PROCEDURE — 99232 SBSQ HOSP IP/OBS MODERATE 35: CPT | Mod: CS

## 2020-12-25 PROCEDURE — 93010 ELECTROCARDIOGRAM REPORT: CPT

## 2020-12-25 PROCEDURE — 99233 SBSQ HOSP IP/OBS HIGH 50: CPT | Mod: CS

## 2020-12-25 RX ORDER — DIGOXIN 250 MCG
0.25 TABLET ORAL DAILY
Refills: 0 | Status: DISCONTINUED | OUTPATIENT
Start: 2020-12-25 | End: 2020-12-27

## 2020-12-25 RX ORDER — ACETAMINOPHEN 500 MG
650 TABLET ORAL EVERY 6 HOURS
Refills: 0 | Status: DISCONTINUED | OUTPATIENT
Start: 2020-12-25 | End: 2020-12-29

## 2020-12-25 RX ORDER — METOPROLOL TARTRATE 50 MG
50 TABLET ORAL
Refills: 0 | Status: DISCONTINUED | OUTPATIENT
Start: 2020-12-25 | End: 2020-12-26

## 2020-12-25 RX ORDER — DIGOXIN 250 MCG
0.25 TABLET ORAL ONCE
Refills: 0 | Status: DISCONTINUED | OUTPATIENT
Start: 2020-12-25 | End: 2020-12-25

## 2020-12-25 RX ORDER — METOPROLOL TARTRATE 50 MG
25 TABLET ORAL ONCE
Refills: 0 | Status: COMPLETED | OUTPATIENT
Start: 2020-12-25 | End: 2020-12-25

## 2020-12-25 RX ORDER — DIGOXIN 250 MCG
0.25 TABLET ORAL ONCE
Refills: 0 | Status: COMPLETED | OUTPATIENT
Start: 2020-12-25 | End: 2020-12-25

## 2020-12-25 RX ORDER — METOPROLOL TARTRATE 50 MG
10 TABLET ORAL ONCE
Refills: 0 | Status: COMPLETED | OUTPATIENT
Start: 2020-12-25 | End: 2020-12-25

## 2020-12-25 RX ORDER — METOPROLOL TARTRATE 50 MG
5 TABLET ORAL ONCE
Refills: 0 | Status: COMPLETED | OUTPATIENT
Start: 2020-12-25 | End: 2020-12-25

## 2020-12-25 RX ORDER — VANCOMYCIN HCL 1 G
1000 VIAL (EA) INTRAVENOUS EVERY 24 HOURS
Refills: 0 | Status: DISCONTINUED | OUTPATIENT
Start: 2020-12-25 | End: 2020-12-29

## 2020-12-25 RX ORDER — METOPROLOL TARTRATE 50 MG
100 TABLET ORAL
Refills: 0 | Status: DISCONTINUED | OUTPATIENT
Start: 2020-12-25 | End: 2020-12-25

## 2020-12-25 RX ADMIN — MONTELUKAST 10 MILLIGRAM(S): 4 TABLET, CHEWABLE ORAL at 10:54

## 2020-12-25 RX ADMIN — Medication 10 MILLILITER(S): at 13:25

## 2020-12-25 RX ADMIN — PANTOPRAZOLE SODIUM 40 MILLIGRAM(S): 20 TABLET, DELAYED RELEASE ORAL at 06:01

## 2020-12-25 RX ADMIN — Medication 650 MILLIGRAM(S): at 20:41

## 2020-12-25 RX ADMIN — BUDESONIDE AND FORMOTEROL FUMARATE DIHYDRATE 2 PUFF(S): 160; 4.5 AEROSOL RESPIRATORY (INHALATION) at 09:34

## 2020-12-25 RX ADMIN — QUETIAPINE FUMARATE 200 MILLIGRAM(S): 200 TABLET, FILM COATED ORAL at 21:52

## 2020-12-25 RX ADMIN — BUDESONIDE AND FORMOTEROL FUMARATE DIHYDRATE 2 PUFF(S): 160; 4.5 AEROSOL RESPIRATORY (INHALATION) at 21:52

## 2020-12-25 RX ADMIN — Medication 0.5 MILLIGRAM(S): at 22:24

## 2020-12-25 RX ADMIN — Medication 10 MILLIGRAM(S): at 04:22

## 2020-12-25 RX ADMIN — DABIGATRAN ETEXILATE MESYLATE 150 MILLIGRAM(S): 150 CAPSULE ORAL at 05:13

## 2020-12-25 RX ADMIN — Medication 250 MILLIGRAM(S): at 03:08

## 2020-12-25 RX ADMIN — Medication 50 MILLIGRAM(S): at 18:11

## 2020-12-25 RX ADMIN — DABIGATRAN ETEXILATE MESYLATE 150 MILLIGRAM(S): 150 CAPSULE ORAL at 18:11

## 2020-12-25 RX ADMIN — CEFEPIME 100 MILLIGRAM(S): 1 INJECTION, POWDER, FOR SOLUTION INTRAMUSCULAR; INTRAVENOUS at 05:17

## 2020-12-25 RX ADMIN — TIOTROPIUM BROMIDE 1 CAPSULE(S): 18 CAPSULE ORAL; RESPIRATORY (INHALATION) at 11:45

## 2020-12-25 RX ADMIN — Medication 0.25 MILLIGRAM(S): at 10:50

## 2020-12-25 RX ADMIN — ATORVASTATIN CALCIUM 20 MILLIGRAM(S): 80 TABLET, FILM COATED ORAL at 21:52

## 2020-12-25 RX ADMIN — Medication 650 MILLIGRAM(S): at 02:53

## 2020-12-25 RX ADMIN — GABAPENTIN 300 MILLIGRAM(S): 400 CAPSULE ORAL at 21:52

## 2020-12-25 RX ADMIN — Medication 5 MILLIGRAM(S): at 04:22

## 2020-12-25 RX ADMIN — Medication 25 MILLIGRAM(S): at 10:50

## 2020-12-25 RX ADMIN — LAMOTRIGINE 100 MILLIGRAM(S): 25 TABLET, ORALLY DISINTEGRATING ORAL at 10:54

## 2020-12-25 RX ADMIN — Medication 81 MILLIGRAM(S): at 10:54

## 2020-12-25 RX ADMIN — CEFEPIME 100 MILLIGRAM(S): 1 INJECTION, POWDER, FOR SOLUTION INTRAMUSCULAR; INTRAVENOUS at 18:11

## 2020-12-25 RX ADMIN — Medication 0.25 MILLIGRAM(S): at 14:41

## 2020-12-25 RX ADMIN — Medication 1 TABLET(S): at 10:54

## 2020-12-25 RX ADMIN — GABAPENTIN 300 MILLIGRAM(S): 400 CAPSULE ORAL at 05:14

## 2020-12-25 RX ADMIN — GABAPENTIN 300 MILLIGRAM(S): 400 CAPSULE ORAL at 13:15

## 2020-12-25 RX ADMIN — SODIUM CHLORIDE 50 MILLILITER(S): 9 INJECTION INTRAMUSCULAR; INTRAVENOUS; SUBCUTANEOUS at 05:14

## 2020-12-25 RX ADMIN — CLOPIDOGREL BISULFATE 75 MILLIGRAM(S): 75 TABLET, FILM COATED ORAL at 10:54

## 2020-12-25 RX ADMIN — Medication 100 MILLIGRAM(S): at 04:25

## 2020-12-25 NOTE — PROGRESS NOTE ADULT - SUBJECTIVE AND OBJECTIVE BOX
pt seen and examined.   more comfortable today compared to yesterday   Vital Signs Last 24 Hrs  T(C): 37.4 (25 Dec 2020 12:00), Max: 40.1 (24 Dec 2020 22:00)  T(F): 99.4 (25 Dec 2020 12:00), Max: 104.2 (24 Dec 2020 22:00)  HR: 132 (25 Dec 2020 12:00) (103 - 180)  BP: 107/65 (25 Dec 2020 12:00) (91/60 - 207/105)  BP(mean): 76 (25 Dec 2020 05:10) (68 - 144)  RR: 18 (25 Dec 2020 12:00) (18 - 20)  SpO2: 96% (25 Dec 2020 12:00) (92% - 100%)    Physical exam:   constitutional NAD, AAOX3, Respiratory  lungs CTA, CVS heart irregular , tachy, GI: abdomen Soft NT, ND, BS+, skin: intact  neuro exam non focal.                           9.3    4.92  )-----------( 130      ( 25 Dec 2020 06:34 )             30.1   12-25    139  |  109  |  19  ----------------------------<  117<H>  4.7   |  21  |  0.9    Ca    8.2<L>      25 Dec 2020 06:34  Mg     2.0     12-25    TPro  5.1<L>  /  Alb  2.9<L>  /  TBili  0.9  /  DBili  x   /  AST  30  /  ALT  16  /  AlkPhos  84  12-25    Thyroid Stimulating Hormone, Serum: 0.48 uIU/mL (12.05.20 @ 23:30)    < from: Xray Chest 1 View- PORTABLE-Routine (Xray Chest 1 View- PORTABLE-Routine in AM.) (12.25.20 @ 06:44) >  Increasing diffuse bilateral parenchymal opacities.  < end of copied text >    Culture - Blood (12.22.20 @ 21:53)    -  Coagulase negative Staphylococcus: Detec    Gram Stain:   Growth in aerobic bottle: Gram Positive Cocci in Clusters    Specimen Source: .Blood Blood-Peripheral    Organism: Blood Culture PCR    Culture Results:   Growth in anaerobic bottle: Staphylococcus capitis  Coag Negative Staphylococcus  Single set isolate, possible contaminant. Contact  Microbiology if susceptibility testing clinically  indicated.  ***Blood Panel PCR results on this specimen are available  approximately 3 hours after the Gram stain result.***  Gram stain, PCR, and/or culture results may not always  correspond due to difference in methodologies.  ************************************************************  This PCR assay was performed using [employer].  The following targets are tested for: Enterococcus,  vancomycin resistant enterococci, Listeria monocytogenes,  coagulase negative staphylococci, S. aureus,  methicillin resistant S. aureus, Streptococcus agalactiae  (Group B), S. pneumoniae, S. pyogenes (Group A),  Acinetobacter baumannii, Enterobacter cloacae, E. coli,  Klebsiella oxytoca, K. pneumoniae, Proteus sp.,  Serratia marcescens, Haemophilus influenzae,  Neisseria meningitidis, Pseudomonas aeruginosa, Candida  albicans, C. glabrata, C krusei, C parapsilosis,  C. tropicalis and the KPC resistance gene.  "Due to technical problems, Proteus sp. and Pseudomonas  aeruginosa will Not be reported as part of the BCID panel  until further notice".    Organism Identification: Blood Culture PCR    Method Type: PCR    Culture - Urine (12.23.20 @ 00:07)    Specimen Source: .Urine Clean Catch (Midstream)    Culture Results:   <10,000 CFU/mL Normal Urogenital Beronica        MEDICATIONS  (STANDING):  aspirin  chewable 81 milliGRAM(s) Oral daily  atorvastatin 20 milliGRAM(s) Oral at bedtime  budesonide 160 MICROgram(s)/formoterol 4.5 MICROgram(s) Inhaler 2 Puff(s) Inhalation two times a day  cefepime   IVPB 1000 milliGRAM(s) IV Intermittent every 12 hours  clopidogrel Tablet 75 milliGRAM(s) Oral daily  dabigatran 150 milliGRAM(s) Oral every 12 hours  digoxin     Tablet 0.25 milliGRAM(s) Oral daily  digoxin  IVPB 0.25 milliGRAM(s) IV Intermittent once  gabapentin 300 milliGRAM(s) Oral three times a day  lamoTRIgine 100 milliGRAM(s) Oral daily  metoprolol tartrate 50 milliGRAM(s) Oral two times a day  montelukast 10 milliGRAM(s) Oral daily  multivitamin 1 Tablet(s) Oral daily  pantoprazole    Tablet 40 milliGRAM(s) Oral before breakfast  QUEtiapine 200 milliGRAM(s) Oral at bedtime  sodium chloride 0.9%. 1000 milliLiter(s) (75 mL/Hr) IV Continuous <Continuous>  sodium chloride 0.9%. 1000 milliLiter(s) (50 mL/Hr) IV Continuous <Continuous>  tiotropium 18 MICROgram(s) Capsule 1 Capsule(s) Inhalation daily  vancomycin  IVPB 1000 milliGRAM(s) IV Intermittent every 24 hours    MEDICATIONS  (PRN):  acetaminophen   Tablet .. 650 milliGRAM(s) Oral every 6 hours PRN Mild Pain (1 - 3)  ALBUTerol    90 MICROgram(s) HFA Inhaler 1 Puff(s) Inhalation every 4 hours PRN Shortness of Breath  aluminum hydroxide/magnesium hydroxide/simethicone Suspension 30 milliLiter(s) Oral every 4 hours PRN Dyspepsia  guaifenesin/dextromethorphan  Syrup 10 milliLiter(s) Oral every 6 hours PRN Cough  LORazepam     Tablet 0.5 milliGRAM(s) Oral two times a day PRN Anxiety    < from: 12 Lead ECG (12.23.20 @ 10:57) >  Ventricular Rate 99 BPM    Atrial Rate 99 BPM    P-R Interval 140 ms    QRS Duration 88 ms    Q-T Interval 352 ms    QTC Calculation(Bazett) 451 ms    P Axis 66 degrees    R Axis 58 degrees    T Axis 35 degrees    Diagnosis Line Normal sinus rhythm  Biatrial enlargement  T wave abnormality, consider lateral ischemia  Abnormal ECG    < end of copied text >        a/p  # hypotensive, poss sepsis, neg uc, bc is positive for coag neg staph, probably contamination, will repeat cultures , was in septic shock requiring pressors but off pressors now. cont cefepim, check nasal mrsa swab   # afib, ( chronic) with rvr, add dig ( in view of hypotension) , and pulm congestion, other options are limited.   # thyroid disease, recheck tsh and free t4  # PAST MEDICAL & SURGICAL HISTORY:  Falls  Congestive heart failure , chronic diastolic grade I, cont meds if tolerates  Transient ischemic attack  FLAVIO on CPAP at home   Bipolar 1 disorder  PVD (peripheral vascular disease)  Other cardiomyopathy  Hypertension  History of cholecystectomy

## 2020-12-25 NOTE — CHART NOTE - NSCHARTNOTEFT_GEN_A_CORE
patient was febrile  40.1 at 10:00, Stat blood cx, ucx, CBC and procal were sent , 1 dose of vanco and tylenol were given.   At 4:00 AM, patient went o afib with RVR , HR was going to 190's. 3 pushes of 5 mg metoprolol were administered and her home dose of 100  metoprolol BID were resumed stat. will monitor

## 2020-12-25 NOTE — PROGRESS NOTE ADULT - ASSESSMENT
IMPRESSION:    Sepsis present on admission  UTI/ prior E Coli in urine  severe Covid pneumonia worsening spiking T  P A fib  COPD on home oxygen        PLAN:    CNS: Avoid CNS depressant    HEENT:  Oral care    PULMONARY:  HOB @ 45 degrees, BIPAP at night, NC during day keep Sao2 92 to 96%, decadron     CARDIOVASCULAR: keep equal balance    GI: GI prophylaxis                                          Feeding NPO    RENAL:  F/u  lytes.  Correct as needed. accurate I/O    INFECTIOUS DISEASE: f/up cx, cefepime till cx, f/up infl markers, ID f/up    HEMATOLOGICAL:  DVT prophylaxis. LE doppler    ENDOCRINE:  Follow up FS.  Insulin protocol if needed.    MUSCULOSKELETAL: bedrest      DISPOSITION: ED3  poor prognosis  advance directives

## 2020-12-25 NOTE — PROGRESS NOTE ADULT - SUBJECTIVE AND OBJECTIVE BOX
OVERNIGHT EVENTS: events noted, worsening status, increase oxygen requirement, spiking T, blood cx reviewed    Vital Signs Last 24 Hrs  T(C): 38.2 (25 Dec 2020 05:10), Max: 40.1 (24 Dec 2020 22:00)  T(F): 100.7 (25 Dec 2020 05:10), Max: 104.2 (24 Dec 2020 22:00)  HR: 120 (25 Dec 2020 05:10) (99 - 180)  BP: 95/64 (25 Dec 2020 05:10) (90/60 - 207/105)  BP(mean): 76 (25 Dec 2020 05:10) (68 - 144)  RR: 20 (25 Dec 2020 05:10) (20 - 20)  SpO2: 99% (25 Dec 2020 05:10) (92% - 100%)    PHYSICAL EXAMINATION:    GENERAL: ill looking, tachypneic    HEENT: Head is normocephalic and atraumatic.    NECK: Supple.    LUNGS: bl crackles    HEART: MARCELINO 3/6    ABDOMEN: Soft, nontender, and nondistended.      EXTREMITIES: Without any cyanosis, clubbing, rash, lesions or edema.    NEUROLOGIC: NON FOCAL    SKIN: No ulceration or induration present.      LABS:                        10.0   4.96  )-----------( 138      ( 24 Dec 2020 22:52 )             31.6     12-24    142  |  109  |  18  ----------------------------<  128<H>  4.2   |  21  |  0.8    Ca    8.4<L>      24 Dec 2020 22:52    TPro  5.8<L>  /  Alb  3.2<L>  /  TBili  0.8  /  DBili  x   /  AST  36  /  ALT  20  /  AlkPhos  94  12-24                    Procalcitonin, Serum: 0.84 ng/mL (12-22-20 @ 21:56)        12-24-20 @ 07:01  -  12-25-20 @ 07:00  --------------------------------------------------------  IN: 300 mL / OUT: 1410 mL / NET: -1110 mL        MICROBIOLOGY:  Culture Results:   <10,000 CFU/mL Normal Urogenital [place] (12-23 @ 00:07)  Culture Results:   No growth to date. (12-22 @ 21:53)  Culture Results:   Growth in aerobic bottle: Gram Positive Cocci in Clusters  ***Blood Panel PCR results on this specimen are available  approximately 3 hours after the Gram stain result.***  Gram stain, PCR, and/or culture results may not always  correspond due to difference in methodologies.  ************************************************************  This PCR assay was performed using [employer].  The following targets are tested for: Enterococcus,  vancomycin resistant enterococci, Listeria monocytogenes,  coagulase negative staphylococci, S. aureus,  methicillin resistant S. aureus, Streptococcus agalactiae  (Group B), S. pneumoniae, S. pyogenes (Group A),  Acinetobacter baumannii, Enterobacter cloacae, E. coli,  Klebsiella oxytoca, K. pneumoniae, Proteus sp.,  Serratia marcescens, Haemophilus influenzae,  Neisseria meningitidis, Pseudomonas aeruginosa, Candida  albicans, C. glabrata, C krusei, C parapsilosis,  C. tropicalis and the KPC resistance gene.  "Due to technical problems, Proteus sp. and Pseudomonas  aeruginosa will Not be reported as part of the BCID panel  until further notice". (12-22 @ 21:53)      MEDICATIONS  (STANDING):  aspirin  chewable 81 milliGRAM(s) Oral daily  atorvastatin 20 milliGRAM(s) Oral at bedtime  budesonide 160 MICROgram(s)/formoterol 4.5 MICROgram(s) Inhaler 2 Puff(s) Inhalation two times a day  cefepime   IVPB 1000 milliGRAM(s) IV Intermittent every 12 hours  clopidogrel Tablet 75 milliGRAM(s) Oral daily  dabigatran 150 milliGRAM(s) Oral every 12 hours  gabapentin 300 milliGRAM(s) Oral three times a day  lamoTRIgine 100 milliGRAM(s) Oral daily  metoprolol tartrate 100 milliGRAM(s) Oral two times a day  montelukast 10 milliGRAM(s) Oral daily  multivitamin 1 Tablet(s) Oral daily  pantoprazole    Tablet 40 milliGRAM(s) Oral before breakfast  QUEtiapine 200 milliGRAM(s) Oral at bedtime  sodium chloride 0.9%. 1000 milliLiter(s) (75 mL/Hr) IV Continuous <Continuous>  sodium chloride 0.9%. 1000 milliLiter(s) (50 mL/Hr) IV Continuous <Continuous>  tiotropium 18 MICROgram(s) Capsule 1 Capsule(s) Inhalation daily  vancomycin  IVPB 1000 milliGRAM(s) IV Intermittent every 24 hours    MEDICATIONS  (PRN):  acetaminophen   Tablet .. 650 milliGRAM(s) Oral every 4 hours PRN Temp greater or equal to 38.5C (101.3F)  ALBUTerol    90 MICROgram(s) HFA Inhaler 1 Puff(s) Inhalation every 4 hours PRN Shortness of Breath  aluminum hydroxide/magnesium hydroxide/simethicone Suspension 30 milliLiter(s) Oral every 4 hours PRN Dyspepsia  guaifenesin/dextromethorphan  Syrup 10 milliLiter(s) Oral every 6 hours PRN Cough  LORazepam     Tablet 0.5 milliGRAM(s) Oral two times a day PRN Anxiety      RADIOLOGY & ADDITIONAL STUDIES:

## 2020-12-25 NOTE — CHART NOTE - NSCHARTNOTEFT_GEN_A_CORE
Patient lying in bed comfortably, only complaint is pleuritic chest pain.    Vital Signs Last 24 Hrs  T(C): 36.8 (25 Dec 2020 08:09), Max: 40.1 (24 Dec 2020 22:00)  T(F): 98.3 (25 Dec 2020 08:09), Max: 104.2 (24 Dec 2020 22:00)  HR: 138 (25 Dec 2020 08:09) (99 - 180)  BP: 91/60 (25 Dec 2020 08:09) (91/60 - 207/105)  BP(mean): 76 (25 Dec 2020 05:10) (68 - 144)  RR: 18 (25 Dec 2020 08:09) (18 - 20)  SpO2: 97% (25 Dec 2020 08:09) (92% - 100%)      Plan:  Start digoxin for a fib  Tylenol for chest pain  Continue fluids and abx

## 2020-12-26 LAB
ANION GAP SERPL CALC-SCNC: 14 MMOL/L — SIGNIFICANT CHANGE UP (ref 7–14)
BASOPHILS # BLD AUTO: 0.02 K/UL — SIGNIFICANT CHANGE UP (ref 0–0.2)
BASOPHILS NFR BLD AUTO: 0.4 % — SIGNIFICANT CHANGE UP (ref 0–1)
BUN SERPL-MCNC: 14 MG/DL — SIGNIFICANT CHANGE UP (ref 10–20)
CALCIUM SERPL-MCNC: 8.1 MG/DL — LOW (ref 8.5–10.1)
CHLORIDE SERPL-SCNC: 110 MMOL/L — SIGNIFICANT CHANGE UP (ref 98–110)
CO2 SERPL-SCNC: 18 MMOL/L — SIGNIFICANT CHANGE UP (ref 17–32)
CREAT SERPL-MCNC: 0.8 MG/DL — SIGNIFICANT CHANGE UP (ref 0.7–1.5)
EOSINOPHIL # BLD AUTO: 0.24 K/UL — SIGNIFICANT CHANGE UP (ref 0–0.7)
EOSINOPHIL NFR BLD AUTO: 4.5 % — SIGNIFICANT CHANGE UP (ref 0–8)
GLUCOSE BLDC GLUCOMTR-MCNC: 89 MG/DL — SIGNIFICANT CHANGE UP (ref 70–99)
GLUCOSE SERPL-MCNC: 105 MG/DL — HIGH (ref 70–99)
HCT VFR BLD CALC: 31.9 % — LOW (ref 37–47)
HGB BLD-MCNC: 9.6 G/DL — LOW (ref 12–16)
IMM GRANULOCYTES NFR BLD AUTO: 0.2 % — SIGNIFICANT CHANGE UP (ref 0.1–0.3)
LYMPHOCYTES # BLD AUTO: 0.8 K/UL — LOW (ref 1.2–3.4)
LYMPHOCYTES # BLD AUTO: 14.9 % — LOW (ref 20.5–51.1)
MCHC RBC-ENTMCNC: 30.1 G/DL — LOW (ref 32–37)
MCHC RBC-ENTMCNC: 30.7 PG — SIGNIFICANT CHANGE UP (ref 27–31)
MCV RBC AUTO: 101.9 FL — HIGH (ref 81–99)
MONOCYTES # BLD AUTO: 0.18 K/UL — SIGNIFICANT CHANGE UP (ref 0.1–0.6)
MONOCYTES NFR BLD AUTO: 3.4 % — SIGNIFICANT CHANGE UP (ref 1.7–9.3)
NEUTROPHILS # BLD AUTO: 4.11 K/UL — SIGNIFICANT CHANGE UP (ref 1.4–6.5)
NEUTROPHILS NFR BLD AUTO: 76.6 % — HIGH (ref 42.2–75.2)
NRBC # BLD: 0 /100 WBCS — SIGNIFICANT CHANGE UP (ref 0–0)
PLATELET # BLD AUTO: 131 K/UL — SIGNIFICANT CHANGE UP (ref 130–400)
POTASSIUM SERPL-MCNC: 4.4 MMOL/L — SIGNIFICANT CHANGE UP (ref 3.5–5)
POTASSIUM SERPL-SCNC: 4.4 MMOL/L — SIGNIFICANT CHANGE UP (ref 3.5–5)
RBC # BLD: 3.13 M/UL — LOW (ref 4.2–5.4)
RBC # FLD: 14.7 % — HIGH (ref 11.5–14.5)
SODIUM SERPL-SCNC: 142 MMOL/L — SIGNIFICANT CHANGE UP (ref 135–146)
WBC # BLD: 5.36 K/UL — SIGNIFICANT CHANGE UP (ref 4.8–10.8)
WBC # FLD AUTO: 5.36 K/UL — SIGNIFICANT CHANGE UP (ref 4.8–10.8)

## 2020-12-26 PROCEDURE — 99232 SBSQ HOSP IP/OBS MODERATE 35: CPT | Mod: CS

## 2020-12-26 PROCEDURE — 70450 CT HEAD/BRAIN W/O DYE: CPT | Mod: 26

## 2020-12-26 PROCEDURE — 99233 SBSQ HOSP IP/OBS HIGH 50: CPT | Mod: CS

## 2020-12-26 PROCEDURE — 99222 1ST HOSP IP/OBS MODERATE 55: CPT

## 2020-12-26 RX ORDER — METOPROLOL TARTRATE 50 MG
100 TABLET ORAL
Refills: 0 | Status: DISCONTINUED | OUTPATIENT
Start: 2020-12-27 | End: 2020-12-27

## 2020-12-26 RX ORDER — METOPROLOL TARTRATE 50 MG
50 TABLET ORAL ONCE
Refills: 0 | Status: COMPLETED | OUTPATIENT
Start: 2020-12-26 | End: 2020-12-26

## 2020-12-26 RX ADMIN — Medication 0.5 MILLIGRAM(S): at 01:52

## 2020-12-26 RX ADMIN — Medication 0.25 MILLIGRAM(S): at 05:45

## 2020-12-26 RX ADMIN — TIOTROPIUM BROMIDE 1 CAPSULE(S): 18 CAPSULE ORAL; RESPIRATORY (INHALATION) at 07:55

## 2020-12-26 RX ADMIN — SODIUM CHLORIDE 50 MILLILITER(S): 9 INJECTION INTRAMUSCULAR; INTRAVENOUS; SUBCUTANEOUS at 21:49

## 2020-12-26 RX ADMIN — Medication 50 MILLIGRAM(S): at 05:44

## 2020-12-26 RX ADMIN — QUETIAPINE FUMARATE 200 MILLIGRAM(S): 200 TABLET, FILM COATED ORAL at 21:45

## 2020-12-26 RX ADMIN — GABAPENTIN 300 MILLIGRAM(S): 400 CAPSULE ORAL at 21:45

## 2020-12-26 RX ADMIN — Medication 250 MILLIGRAM(S): at 01:30

## 2020-12-26 RX ADMIN — MONTELUKAST 10 MILLIGRAM(S): 4 TABLET, CHEWABLE ORAL at 12:06

## 2020-12-26 RX ADMIN — PANTOPRAZOLE SODIUM 40 MILLIGRAM(S): 20 TABLET, DELAYED RELEASE ORAL at 06:02

## 2020-12-26 RX ADMIN — Medication 0.5 MILLIGRAM(S): at 21:56

## 2020-12-26 RX ADMIN — CEFEPIME 100 MILLIGRAM(S): 1 INJECTION, POWDER, FOR SOLUTION INTRAMUSCULAR; INTRAVENOUS at 05:44

## 2020-12-26 RX ADMIN — ATORVASTATIN CALCIUM 20 MILLIGRAM(S): 80 TABLET, FILM COATED ORAL at 21:45

## 2020-12-26 RX ADMIN — GABAPENTIN 300 MILLIGRAM(S): 400 CAPSULE ORAL at 12:06

## 2020-12-26 RX ADMIN — GABAPENTIN 300 MILLIGRAM(S): 400 CAPSULE ORAL at 05:44

## 2020-12-26 RX ADMIN — CEFEPIME 100 MILLIGRAM(S): 1 INJECTION, POWDER, FOR SOLUTION INTRAMUSCULAR; INTRAVENOUS at 17:14

## 2020-12-26 RX ADMIN — Medication 81 MILLIGRAM(S): at 12:06

## 2020-12-26 RX ADMIN — LAMOTRIGINE 100 MILLIGRAM(S): 25 TABLET, ORALLY DISINTEGRATING ORAL at 12:06

## 2020-12-26 RX ADMIN — DABIGATRAN ETEXILATE MESYLATE 150 MILLIGRAM(S): 150 CAPSULE ORAL at 05:45

## 2020-12-26 RX ADMIN — CLOPIDOGREL BISULFATE 75 MILLIGRAM(S): 75 TABLET, FILM COATED ORAL at 12:06

## 2020-12-26 RX ADMIN — Medication 50 MILLIGRAM(S): at 10:16

## 2020-12-26 RX ADMIN — Medication 1 TABLET(S): at 12:06

## 2020-12-26 NOTE — CONSULT NOTE ADULT - ATTENDING COMMENTS
Patient examined earlier tonight and was on rebreather.  She was able to move all four and communicate though it was difficult to understand everything she was saying through the mask.  No focal motor symptoms were seen.  No facial assymetry.  Patient was oriented to hospital, month and year.  Continue treatment of hypoxia.   MRI of brain with /without contrast when patient able to tolerate being supine, if not otherwise contraindicated.

## 2020-12-26 NOTE — PHYSICAL THERAPY INITIAL EVALUATION ADULT - SPECIFY REASON(S)
Patient currently  on BIPAP, appears to be on respiratory distress. Spoke with RN and informed RN to hold PT for now. Patient unstable.. Will follow-up.

## 2020-12-26 NOTE — CONSULT NOTE ADULT - SUBJECTIVE AND OBJECTIVE BOX
Chief Complaint: Slurred speech        HPI:  76yo female pmhx of COPD on 5L home oxygen, paroxysmal afib on pradaxa, carotid stenosis (L 80%, R 40%) s/p L carotid stenting recently , HFpEF (EF 70% Dec 2020), HTN, HLD, bipolar disorder ,left parotid mass ,  recently admitted between 12/7-12/11 for COVID PNA s/p steroids / RDV initially presented to the ED with chest pain. ED Course: initially vitally stable but she became hypotensive to 70/50 s/p 3L bolus >>remained hypotensive was started on low dose levo. Labs: BUN/Cr: 39/1.6, lactate :3, UA + Nitrite LE+ admitted for urosepsis and JOYCE.         Interval History  A stroke code was activated this time for c/o slurring of speech. Patient states that her symptoms have been intermittent since yesterday but this episode is worse. No focal weakness or other complaints. NIHSS 2        2. Relevant PMH:   Prior ischemic stroke/TIA[ ], Afib [x ], CAD [ ], HTN [ x], DLD [ ], DM [ ], PVD [ ], Obesity [ ],   Sedentary lifestyle [ ], CHF [ ], FLAVIO [ ], Cancer Hx [ ].          3. Social History: Smoking [ ], Drug Use [ ], Alcohol Use [ ], Other [ ]         4. Possible Location of Stroke  Will know after stroke work up is complete      5. Relevant Brain Tissue Imaging:  < from: CT Head No Cont (12.26.20 @ 06:38) >  FINDINGS:    The ventricular, basal cisternal and sulcal patterns are appropriate for patient's stated age.    No evidence of acute intracranial hemorrhage, large territorial infarct,or significant space-occupying lesion. No extra-axial collections. The craniocervical junction is unremarkable.    No depressed calvarial fracture. Imaged portions of the paranasal sinuses are clear.      IMPRESSION:    No CT evidence of acute territorial infarct or other acute intracranial pathology.          ******PRELIMINARY REPORT******    ******PRELIMINARY REPORT******          CHELE LANE M.D., RESIDENT RADIOLOGIST    < end of copied text >            6. Relevant Cerebrovascular Imaging:      CT Perfusion w/ Maps w/ IV Cont:   EXAM:  CT PERFUSION W MAPS IC            PROCEDURE DATE:  12/05/2020    INTERPRETATION:  Reason for Exam: Post operative left transcarotid artery revascularization for high-grade carotid stenosis.  IMPRESSION:    CT PERFUSION:  Normal perfusion images of the brain.    CTA head/neck:  Interval left carotid artery stent placement extending from the carotid bulb to the proximal ICA with intact intraluminal flow and expected postoperative changes. Mild narrowing within the stent measuring 0.3 cm.    No evidence of large vessel occlusion or aneurysm.    Redemonstration of left parotid mass.    Severe extensive emphysematous changes of the lungs.    SHILPA SAGASTUME M.D., RESIDENT RADIOLOGIST  This document has been electronically signed.  CHIARA SPENCER M.D., ATTENDING RADIOLOGIST  This document has been electronically signed. Dec  5 2020 11:07AM (12-05-20 @ 10:25)         7. Relevant blood tests:    Lipid Profile in AM (10.16.20 @ 05:42)   Total Cholesterol/HDL Ratio Measurement: 4.8 Ratio   Cholesterol, Serum: 192 mg/dL   Triglycerides, Serum: 239 mg/dL   HDL Cholesterol, Serum: 40: HDL Levels >/= 60 mg/dL are considered beneficial and a "negative" risk   factor.   Effective 08/15/2018: New reference range and interpretive comment. mg/dL   Direct LDL: 105: LDL Cholesterol (mg/dL) --- Interpretive Comment (for adults 18 and over)               8. Relevant cardiac rhythm monitoring:  < from: 12 Lead ECG (12.23.20 @ 10:57) >    Ventricular Rate 99 BPM    Atrial Rate 99 BPM    P-R Interval 140 ms    QRS Duration 88 ms    Q-T Interval 352 ms    QTC Calculation(Bazett) 451 ms    P Axis 66 degrees    R Axis 58 degrees    T Axis 35 degrees    Diagnosis Line Normal sinus rhythm  Biatrial enlargement  T wave abnormality, consider lateral ischemia  Abnormal ECG    Confirmed by Scott Cowan ([id number]) on 12/23/2020 4:12:24 PM    < end of copied text >            9. Relevant Cardiac Structure: (TTE/NAVEED +/-):[ ]No intracardiac thrombus/[ ] no vegetation/[ ]no [place]/EF: < from: TTE Echo Complete w/o Contrast w/ Doppler (12.02.20 @ 08:52) >  Summary:   1. Hyperdynamic global left ventricular systolic function.   2. LV Ejection Fraction by Scott's Method with a biplane EF of 70 %.   3. Moderately increased LV wall thickness. LVOT gradient of   30 mmHg noted.   4. Spectral Doppler shows impaired relaxation pattern of left ventricular myocardial filling (Grade I diastolic dysfunction).   5. Mildly enlarged left atrium.   6.Normal right atrial size.   7. Thickening and calcification of the anterior and posterior mitral valve leaflets.   8. Trace mitral valve regurgitation.   9. Mitral annular calcification.    < end of copied text >      Home Medications:  Amlodipine 2.5 mg oral tablet: 1 tab(s) orally once a day (29 Nov 2020 16:46)  aspirin 81 mg oral tablet: 1 tab(s) orally once a day (29 Nov 2020 16:46)  atorvastatin 20 mg oral tablet: 1 tab(s) orally once a day (29 Nov 2020 16:46)  budesonide-formoterol 160 mcg-4.5 mcg/inh inhalation aerosol: 2 puff(s) inhaled 2 times a day (29 Nov 2020 16:46)  furosemide 40 mg oral tablet: 1 tab(s) orally once a day (29 Nov 2020 16:46)  Lamotrigine 100 mg oral tablet, extended release: 1 tab(s) orally once a day (29 Nov 2020 16:46)  Lorazepam 0.5 mg oral tablet: 1 tab(s) orally 2 times a day, As Needed (29 Nov 2020 16:46)  Metoprolol Tartrate 100 mg oral tablet: 1 tab(s) orally 2 times a day (29 Nov 2020 16:46)  montelukast 10 mg oral tablet: 1 tab(s) orally once a day (29 Nov 2020 16:46)  Pepcid 20 mg oral tablet: 1 tab(s) orally once a day (29 Nov 2020 16:46)  Plavix 75 mg oral tablet: 1 tab(s) orally once a day (29 Nov 2020 16:46)  Quetiapine 200 mg oral tablet: 1 tab(s) orally once a day (at bedtime) (29 Nov 2020 16:46)  tiotropium 18 mcg inhalation capsule: 1 cap(s) inhaled once a day (02 Dec 2020 13:51)      MEDICATIONS  (STANDING):  aspirin  chewable 81 milliGRAM(s) Oral daily  atorvastatin 20 milliGRAM(s) Oral at bedtime  budesonide 160 MICROgram(s)/formoterol 4.5 MICROgram(s) Inhaler 2 Puff(s) Inhalation two times a day  cefepime   IVPB 1000 milliGRAM(s) IV Intermittent every 12 hours  clopidogrel Tablet 75 milliGRAM(s) Oral daily  dabigatran 150 milliGRAM(s) Oral every 12 hours  digoxin     Tablet 0.25 milliGRAM(s) Oral daily  gabapentin 300 milliGRAM(s) Oral three times a day  lamoTRIgine 100 milliGRAM(s) Oral daily  metoprolol tartrate 50 milliGRAM(s) Oral two times a day  montelukast 10 milliGRAM(s) Oral daily  multivitamin 1 Tablet(s) Oral daily  pantoprazole    Tablet 40 milliGRAM(s) Oral before breakfast  QUEtiapine 200 milliGRAM(s) Oral at bedtime  sodium chloride 0.9%. 1000 milliLiter(s) (75 mL/Hr) IV Continuous <Continuous>  sodium chloride 0.9%. 1000 milliLiter(s) (50 mL/Hr) IV Continuous <Continuous>  tiotropium 18 MICROgram(s) Capsule 1 Capsule(s) Inhalation daily  vancomycin  IVPB 1000 milliGRAM(s) IV Intermittent every 24 hours      10. PT/OT/Speech/Rehab/S&Sw/ Cognitive eval results and recommendations: Pending        11. Exam:  patient a/o x 4, speech is slurred  cn: intact except for moderate to severe dysarthria  power: 4-/5 UE              3/5 LE  sensory: Intact b/lly  Gait: deferred      Vital Signs Last 24 Hrs  T(C): 37 (26 Dec 2020 01:31), Max: 38.8 (25 Dec 2020 20:31)  T(F): 98.6 (26 Dec 2020 01:31), Max: 101.8 (25 Dec 2020 20:31)  HR: 103 (26 Dec 2020 01:31) (85 - 138)  BP: 114/53 (26 Dec 2020 00:23) (91/60 - 163/69)  BP(mean): --  RR: 18 (26 Dec 2020 00:23) (18 - 18)  SpO2: 95% (26 Dec 2020 01:31) (93% - 100%)        12.   NIH STROKE SCALE  Item	                                                        Score  1 a.	Level of Consciousness	               	0  1 b. LOC Questions	                                    0  1 c.	LOC Commands	                               	0  2.	Best Gaze	                                    0  3.	Visual	                                                0  4.	Facial Palsy	                                    0  5 a.	Motor Arm - Left	                        0  5 b.	Motor Arm - Right	                        0  6 a.	Motor Leg - Left	                        0  6 b.	Motor Leg - Right	                        0  7.	Limb Ataxia	                                    0  8.	Sensory	                                                0  9.	Language	                                    0  10.	Dysarthria	                                    2  11.	Extinction and Inattention  	            0  ______________________________________  TOTAL	                                                        2    Total NIHSS on admission:  2           mRS: (baseline mrankin 3)  0 No symptoms at all  1 No significant disability despite symptoms; able to carry out all usual duties and activities without assistance  2 Slight disability; unable to carry out all previous activities, but able to look after own affairs  3 Moderate disability; requiring some help, but able to walk without assistance  4 Moderately severe disability; unable to walk without assistance and unable to attend to own bodily needs without assistance  5 Severe disability; bedridden, incontinent and requiring constant nursing care and attention  6 Dead

## 2020-12-26 NOTE — CONSULT NOTE ADULT - ASSESSMENT
3. Impression:  74yo female pmhx of COPD on 5L home oxygen, paroxysmal afib on pradaxa, carotid stenosis (L 80%, R 40%) s/p L carotid stenting recently , HFpEF (EF 70% Dec 2020), HTN, HLD, bipolar disorder ,left parotid mass ,  recently admitted between 12/7-12/11 for COVID PNA s/p steroids / RDV initially presented to the ED with chest pain. UA + Nitrite LE+ admitted for urosepsis and JOYCE. A stroke code was activated this time for c/o slurring of speech. Patient states that her symptoms have been intermittent since yesterday but this episode is worse. No focal weakness or other complaints. NIHSS 2. Patient is within the window for tpa but is not a tpa candidate since she is on Pradaxa. Case was discussed with neuroattending on call Dr ankita davis.        14. Probable cause/s of Stroke: Possibly embolic       15. Suggestions:   Routine stroke workup including:  N/C MRI BRAIN  echo  HBAIC  pt/rehab  speech and swallow      16. Disposition: Continue telemonitoring in ed 3   3. Impression:  74yo female pmhx of COPD on 5L home oxygen, paroxysmal afib on pradaxa, carotid stenosis (L 80%, R 40%) s/p L carotid stenting recently , HFpEF (EF 70% Dec 2020), HTN, HLD, bipolar disorder ,left parotid mass ,  recently admitted between 12/7-12/11 for COVID PNA s/p steroids / RDV initially presented to the ED with chest pain. UA + Nitrite LE+ admitted for urosepsis and JOYCE. A stroke code was activated this time for c/o slurring of speech. Patient states that her symptoms have been intermittent since yesterday but this episode is worse. No focal weakness or other complaints. NIHSS 2. Patient is within the window for tpa but is not a tpa candidate since she is on Pradaxa. Case was discussed with neuroattending on call Dr Filiberto Gallegos.        14. Probable cause/s of Stroke: Possibly embolic       15. Suggestions:   Routine stroke workup including:  N/C MRI BRAIN  echo  HBAIC  pt/rehab  speech and swallow      16. Disposition: Continue telemonitoring in ed 3

## 2020-12-26 NOTE — PROGRESS NOTE ADULT - SUBJECTIVE AND OBJECTIVE BOX
pt is seen and examined.   has no pain, sob is better  Vital Signs Last 24 Hrs  T(C): 37.4 (26 Dec 2020 09:45), Max: 38.8 (25 Dec 2020 20:31)  T(F): 99.3 (26 Dec 2020 09:45), Max: 101.8 (25 Dec 2020 20:31)  HR: 96 (26 Dec 2020 10:21) (85 - 110)  BP: 172/74 (26 Dec 2020 12:54) (114/53 - 180/76)  RR: 20 (26 Dec 2020 12:54) (18 - 20)  SpO2: 96% (26 Dec 2020 12:54) (93% - 100%)  Physical exam:   constitutional NAD, AAOX3, Respiratory  lungs CTA, CVS heart RRR, GI: abdomen Soft NT, ND, BS+, skin: intact  neuro exam non focal.                         9.6    5.36  )-----------( 131      ( 26 Dec 2020 06:22 )             31.9   12-26    142  |  110  |  14  ----------------------------<  105<H>  4.4   |  18  |  0.8    Ca    8.1<L>      26 Dec 2020 06:22  Mg     2.0     12-25    TPro  5.1<L>  /  Alb  2.9<L>  /  TBili  0.9  /  DBili  x   /  AST  30  /  ALT  16  /  AlkPhos  84  12-25    < from: Xray Chest 1 View- PORTABLE-Routine (Xray Chest 1 View- PORTABLE-Routine in AM.) (12.25.20 @ 06:44) >  Increasing diffuse bilateral parenchymal opacities.    < end of copied text >    Culture - Blood (12.24.20 @ 22:53)    Specimen Source: .Blood Blood-Venous    Culture Results:   No growth to date.    Culture - Urine (12.23.20 @ 00:07)    Specimen Source: .Urine Clean Catch (Midstream)    Culture Results:   <10,000 CFU/mL Normal Urogenital Beronica    MEDICATIONS  (STANDING):  aspirin  chewable 81 milliGRAM(s) Oral daily  atorvastatin 20 milliGRAM(s) Oral at bedtime  budesonide 160 MICROgram(s)/formoterol 4.5 MICROgram(s) Inhaler 2 Puff(s) Inhalation two times a day  cefepime   IVPB 1000 milliGRAM(s) IV Intermittent every 12 hours  clopidogrel Tablet 75 milliGRAM(s) Oral daily  dabigatran 150 milliGRAM(s) Oral every 12 hours  digoxin     Tablet 0.25 milliGRAM(s) Oral daily  gabapentin 300 milliGRAM(s) Oral three times a day  lamoTRIgine 100 milliGRAM(s) Oral daily  montelukast 10 milliGRAM(s) Oral daily  multivitamin 1 Tablet(s) Oral daily  pantoprazole    Tablet 40 milliGRAM(s) Oral before breakfast  QUEtiapine 200 milliGRAM(s) Oral at bedtime  sodium chloride 0.9%. 1000 milliLiter(s) (75 mL/Hr) IV Continuous <Continuous>  sodium chloride 0.9%. 1000 milliLiter(s) (50 mL/Hr) IV Continuous <Continuous>  tiotropium 18 MICROgram(s) Capsule 1 Capsule(s) Inhalation daily  vancomycin  IVPB 1000 milliGRAM(s) IV Intermittent every 24 hours    MEDICATIONS  (PRN):  acetaminophen   Tablet .. 650 milliGRAM(s) Oral every 6 hours PRN Mild Pain (1 - 3)  ALBUTerol    90 MICROgram(s) HFA Inhaler 1 Puff(s) Inhalation every 4 hours PRN Shortness of Breath  aluminum hydroxide/magnesium hydroxide/simethicone Suspension 30 milliLiter(s) Oral every 4 hours PRN Dyspepsia  guaifenesin/dextromethorphan  Syrup 10 milliLiter(s) Oral every 6 hours PRN Cough  LORazepam     Tablet 0.5 milliGRAM(s) Oral two times a day PRN Anxiety        a/p  # sepsis , unknown source, now stable off pressors, now bp is high, will resume her home dose metoprolol , fu mrsa swab  fu ID   # afib with rvr, resume metoprolol   # copd pt has home cpap, resume at night.   # bipolar, cont meds  # dyslipidemia cont emds  poss downgrade in 24-48 hrs    spoke with pt , re dx and plan, she understands, asked her if she wants me to call her  and she does not want me to call him today     full code

## 2020-12-26 NOTE — CHART NOTE - NSCHARTNOTEFT_GEN_A_CORE
patient complained of slurred speech this AM . Upon exam, patient was AAOX3, non focal , CN II-XII intact , no pronator drift . slowed speech was noted . Urgent CT head ordered. Will follow

## 2020-12-26 NOTE — PROGRESS NOTE ADULT - ASSESSMENT
IMPRESSION:    Sepsis present on admission/ staph capitis  UTI/ prior E Coli in urine  severe Covid pneumonia worsening spiking T  P A fib  COPD on home oxygen  ? stroke         PLAN:    CNS: Neuro eval ( head CT neg)    HEENT:  Oral care    PULMONARY:  HOB @ 45 degrees, BIPAP at night, NC during day keep Sao2 92 to 96%, decadron     CARDIOVASCULAR: keep equal balance    GI: GI prophylaxis                                          Feeding NPO    RENAL:  F/u  lytes.  Correct as needed. accurate I/O    INFECTIOUS DISEASE: f/up cx, cefepime/ vanc till cx, f/up infl markers, ID f/up, repeat cx    HEMATOLOGICAL:  DVT prophylaxis. on pradaxa    ENDOCRINE:  Follow up FS.  Insulin protocol if needed.    MUSCULOSKELETAL: bedrest      DISPOSITION: ED3  poor prognosis  advance directives

## 2020-12-27 LAB
ANION GAP SERPL CALC-SCNC: 12 MMOL/L — SIGNIFICANT CHANGE UP (ref 7–14)
BASOPHILS # BLD AUTO: 0.01 K/UL — SIGNIFICANT CHANGE UP (ref 0–0.2)
BASOPHILS NFR BLD AUTO: 0.2 % — SIGNIFICANT CHANGE UP (ref 0–1)
BUN SERPL-MCNC: 14 MG/DL — SIGNIFICANT CHANGE UP (ref 10–20)
CALCIUM SERPL-MCNC: 8.2 MG/DL — LOW (ref 8.5–10.1)
CHLORIDE SERPL-SCNC: 107 MMOL/L — SIGNIFICANT CHANGE UP (ref 98–110)
CO2 SERPL-SCNC: 19 MMOL/L — SIGNIFICANT CHANGE UP (ref 17–32)
CREAT SERPL-MCNC: 0.8 MG/DL — SIGNIFICANT CHANGE UP (ref 0.7–1.5)
EOSINOPHIL # BLD AUTO: 0.16 K/UL — SIGNIFICANT CHANGE UP (ref 0–0.7)
EOSINOPHIL NFR BLD AUTO: 3.5 % — SIGNIFICANT CHANGE UP (ref 0–8)
GLUCOSE SERPL-MCNC: 76 MG/DL — SIGNIFICANT CHANGE UP (ref 70–99)
HCT VFR BLD CALC: 31.1 % — LOW (ref 37–47)
HGB BLD-MCNC: 9.4 G/DL — LOW (ref 12–16)
IMM GRANULOCYTES NFR BLD AUTO: 0.2 % — SIGNIFICANT CHANGE UP (ref 0.1–0.3)
LYMPHOCYTES # BLD AUTO: 0.64 K/UL — LOW (ref 1.2–3.4)
LYMPHOCYTES # BLD AUTO: 14.1 % — LOW (ref 20.5–51.1)
MCHC RBC-ENTMCNC: 30.2 G/DL — LOW (ref 32–37)
MCHC RBC-ENTMCNC: 30.4 PG — SIGNIFICANT CHANGE UP (ref 27–31)
MCV RBC AUTO: 100.6 FL — HIGH (ref 81–99)
MONOCYTES # BLD AUTO: 0.19 K/UL — SIGNIFICANT CHANGE UP (ref 0.1–0.6)
MONOCYTES NFR BLD AUTO: 4.2 % — SIGNIFICANT CHANGE UP (ref 1.7–9.3)
NEUTROPHILS # BLD AUTO: 3.53 K/UL — SIGNIFICANT CHANGE UP (ref 1.4–6.5)
NEUTROPHILS NFR BLD AUTO: 77.8 % — HIGH (ref 42.2–75.2)
NRBC # BLD: 0 /100 WBCS — SIGNIFICANT CHANGE UP (ref 0–0)
PLATELET # BLD AUTO: 141 K/UL — SIGNIFICANT CHANGE UP (ref 130–400)
POTASSIUM SERPL-MCNC: 5.6 MMOL/L — HIGH (ref 3.5–5)
POTASSIUM SERPL-SCNC: 5.6 MMOL/L — HIGH (ref 3.5–5)
RBC # BLD: 3.09 M/UL — LOW (ref 4.2–5.4)
RBC # FLD: 14.1 % — SIGNIFICANT CHANGE UP (ref 11.5–14.5)
SODIUM SERPL-SCNC: 138 MMOL/L — SIGNIFICANT CHANGE UP (ref 135–146)
WBC # BLD: 4.54 K/UL — LOW (ref 4.8–10.8)
WBC # FLD AUTO: 4.54 K/UL — LOW (ref 4.8–10.8)

## 2020-12-27 PROCEDURE — 99233 SBSQ HOSP IP/OBS HIGH 50: CPT | Mod: CS

## 2020-12-27 PROCEDURE — 93010 ELECTROCARDIOGRAM REPORT: CPT

## 2020-12-27 PROCEDURE — 99232 SBSQ HOSP IP/OBS MODERATE 35: CPT | Mod: CS

## 2020-12-27 RX ORDER — METOPROLOL TARTRATE 50 MG
5 TABLET ORAL ONCE
Refills: 0 | Status: COMPLETED | OUTPATIENT
Start: 2020-12-27 | End: 2020-12-27

## 2020-12-27 RX ORDER — METOPROLOL TARTRATE 50 MG
10 TABLET ORAL ONCE
Refills: 0 | Status: COMPLETED | OUTPATIENT
Start: 2020-12-27 | End: 2020-12-27

## 2020-12-27 RX ORDER — ESMOLOL HCL 100MG/10ML
50.45 VIAL (ML) INTRAVENOUS
Qty: 2500 | Refills: 0 | Status: DISCONTINUED | OUTPATIENT
Start: 2020-12-27 | End: 2020-12-28

## 2020-12-27 RX ORDER — METOPROLOL TARTRATE 50 MG
5 TABLET ORAL ONCE
Refills: 0 | Status: DISCONTINUED | OUTPATIENT
Start: 2020-12-27 | End: 2020-12-27

## 2020-12-27 RX ADMIN — Medication 1 TABLET(S): at 12:09

## 2020-12-27 RX ADMIN — Medication 2 MILLIGRAM(S): at 05:58

## 2020-12-27 RX ADMIN — Medication 250 MILLIGRAM(S): at 01:09

## 2020-12-27 RX ADMIN — MONTELUKAST 10 MILLIGRAM(S): 4 TABLET, CHEWABLE ORAL at 12:09

## 2020-12-27 RX ADMIN — CLOPIDOGREL BISULFATE 75 MILLIGRAM(S): 75 TABLET, FILM COATED ORAL at 12:09

## 2020-12-27 RX ADMIN — Medication 10 MILLILITER(S): at 12:22

## 2020-12-27 RX ADMIN — Medication 24.8 MICROGRAM(S)/KG/MIN: at 09:32

## 2020-12-27 RX ADMIN — QUETIAPINE FUMARATE 200 MILLIGRAM(S): 200 TABLET, FILM COATED ORAL at 22:42

## 2020-12-27 RX ADMIN — Medication 10 MILLIGRAM(S): at 06:35

## 2020-12-27 RX ADMIN — LAMOTRIGINE 100 MILLIGRAM(S): 25 TABLET, ORALLY DISINTEGRATING ORAL at 12:09

## 2020-12-27 RX ADMIN — DABIGATRAN ETEXILATE MESYLATE 150 MILLIGRAM(S): 150 CAPSULE ORAL at 12:20

## 2020-12-27 RX ADMIN — Medication 5 MILLIGRAM(S): at 05:42

## 2020-12-27 RX ADMIN — CEFEPIME 100 MILLIGRAM(S): 1 INJECTION, POWDER, FOR SOLUTION INTRAMUSCULAR; INTRAVENOUS at 17:17

## 2020-12-27 RX ADMIN — Medication 650 MILLIGRAM(S): at 22:42

## 2020-12-27 RX ADMIN — CEFEPIME 100 MILLIGRAM(S): 1 INJECTION, POWDER, FOR SOLUTION INTRAMUSCULAR; INTRAVENOUS at 05:43

## 2020-12-27 RX ADMIN — Medication 81 MILLIGRAM(S): at 12:09

## 2020-12-27 RX ADMIN — GABAPENTIN 300 MILLIGRAM(S): 400 CAPSULE ORAL at 22:42

## 2020-12-27 RX ADMIN — DABIGATRAN ETEXILATE MESYLATE 150 MILLIGRAM(S): 150 CAPSULE ORAL at 22:43

## 2020-12-27 RX ADMIN — GABAPENTIN 300 MILLIGRAM(S): 400 CAPSULE ORAL at 13:14

## 2020-12-27 RX ADMIN — ATORVASTATIN CALCIUM 20 MILLIGRAM(S): 80 TABLET, FILM COATED ORAL at 22:42

## 2020-12-27 RX ADMIN — Medication 650 MILLIGRAM(S): at 23:15

## 2020-12-27 RX ADMIN — Medication 250 MILLIGRAM(S): at 22:41

## 2020-12-27 NOTE — PROGRESS NOTE ADULT - SUBJECTIVE AND OBJECTIVE BOX
Pt was seen and examined.  Pt reports dryness in her nose but reports improvement in breathing.    ROS: Neg except as noted above    PAST MEDICAL & SURGICAL HISTORY:  Falls    Congestive heart failure    Transient ischemic attack    FLAVIO on CPAP    Bipolar 1 disorder    Allergic reaction    PVD (peripheral vascular disease)    Other emphysema    Other cardiomyopathy    TIA (transient ischemic attack)    COPD (chronic obstructive pulmonary disease)    Depression    Hypertension    History of cholecystectomy    Allergies    codeine (Other (Moderate))  Depakote (Unknown)  Dilaudid (Short breath; Rash)  IV Contrast (Anaphylaxis)  losartan (Angioedema)  Risperdal (Other)  verapamil (Short breath; Angioedema)    Intolerances    MEDICATIONS  (STANDING):  aspirin  chewable 81 milliGRAM(s) Oral daily  atorvastatin 20 milliGRAM(s) Oral at bedtime  budesonide 160 MICROgram(s)/formoterol 4.5 MICROgram(s) Inhaler 2 Puff(s) Inhalation two times a day  cefepime   IVPB 1000 milliGRAM(s) IV Intermittent every 12 hours  clopidogrel Tablet 75 milliGRAM(s) Oral daily  dabigatran 150 milliGRAM(s) Oral every 12 hours  esMOLOL  Infusion 50 MICROgram(s)/kG/Min (24.8 mL/Hr) IV Continuous <Continuous>  gabapentin 300 milliGRAM(s) Oral three times a day  lamoTRIgine 100 milliGRAM(s) Oral daily  montelukast 10 milliGRAM(s) Oral daily  multivitamin 1 Tablet(s) Oral daily  pantoprazole    Tablet 40 milliGRAM(s) Oral before breakfast  QUEtiapine 200 milliGRAM(s) Oral at bedtime  sodium chloride 0.9%. 1000 milliLiter(s) (75 mL/Hr) IV Continuous <Continuous>  sodium chloride 0.9%. 1000 milliLiter(s) (50 mL/Hr) IV Continuous <Continuous>  tiotropium 18 MICROgram(s) Capsule 1 Capsule(s) Inhalation daily  vancomycin  IVPB 1000 milliGRAM(s) IV Intermittent every 24 hours    MEDICATIONS  (PRN):  acetaminophen   Tablet .. 650 milliGRAM(s) Oral every 6 hours PRN Mild Pain (1 - 3)  ALBUTerol    90 MICROgram(s) HFA Inhaler 1 Puff(s) Inhalation every 4 hours PRN Shortness of Breath  aluminum hydroxide/magnesium hydroxide/simethicone Suspension 30 milliLiter(s) Oral every 4 hours PRN Dyspepsia  guaifenesin/dextromethorphan  Syrup 10 milliLiter(s) Oral every 6 hours PRN Cough  LORazepam     Tablet 0.5 milliGRAM(s) Oral two times a day PRN Anxiety    Vital Signs Last 24 Hrs  T(C): 37.1 (27 Dec 2020 17:18), Max: 37.1 (27 Dec 2020 17:18)  T(F): 98.7 (27 Dec 2020 17:18), Max: 98.7 (27 Dec 2020 17:18)  HR: 84 (27 Dec 2020 17:18) (84 - 130)  BP: 126/65 (27 Dec 2020 17:18) (121/59 - 142/78)  BP(mean): --  RR: 18 (27 Dec 2020 17:18) (18 - 20)  SpO2: 95% (27 Dec 2020 17:18) (94% - 97%)    Physical Exam:  Constitutional: Not in acute distress, HFNC  SKIN: No rashes or lesions  HEENT: Atraumatic. Normocephalic. Anicteric  NECK:  No JVD.   PULMONARY: Clear Auscultation bilateraly. non labored respirations.   Cardiovascular: Normal S1, S2. Regular rate and rhythm. No murmurs.  ABDOMEN: Soft, Nontender, Nondistended; Bowel sounds are present  EXTREMITIES:  Non edematous, non cyanotic  NEUROLOGIC:  Alert & oriented x 3, No motor deficit.  PSYCH:anxious    Labs:  CBC                        9.4    4.54  )-----------( 141      ( 27 Dec 2020 12:03 )             31.1   CMP  12-27    138  |  107  |  14  ----------------------------<  76  5.6<H>   |  19  |  0.8    Ca    8.2<L>      27 Dec 2020 12:03    < from: Xray Chest 1 View- PORTABLE-Routine (Xray Chest 1 View- PORTABLE-Routine in AM.) (12.25.20 @ 06:44) >  Impression:    Increasing diffuse bilateral parenchymal opacities.    EXAM:  XR CHEST PORTABLE ROUTINE 1V            PROCEDURE DATE:  12/25/2020      < end of copied text >

## 2020-12-27 NOTE — PROGRESS NOTE ADULT - SUBJECTIVE AND OBJECTIVE BOX
OVERNIGHT EVENTS: events noted, on high oxygen rapid A fib    Vital Signs Last 24 Hrs  T(C): 36.7 (27 Dec 2020 04:00), Max: 37.4 (26 Dec 2020 09:45)  T(F): 98 (27 Dec 2020 04:00), Max: 99.3 (26 Dec 2020 09:45)  HR: 104 (27 Dec 2020 04:02) (90 - 111)  BP: 142/78 (27 Dec 2020 04:00) (131/62 - 180/76)  RR: 18 (27 Dec 2020 04:00) (18 - 20)  SpO2: 96% (27 Dec 2020 04:02) (94% - 97%)    PHYSICAL EXAMINATION:    GENERAL: ill looking    HEENT: Head is normocephalic and atraumatic.    NECK: Supple.    LUNGS: bl crackles    HEART: MARCELINO 3/6, irregular    ABDOMEN: Soft, nontender, and nondistended.      EXTREMITIES: Without any cyanosis, clubbing, rash, lesions or edema.    NEUROLOGIC: Grossly intact.    SKIN: No ulceration or induration present.      LABS:                        9.6    5.36  )-----------( 131      ( 26 Dec 2020 06:22 )             31.9     12-26    142  |  110  |  14  ----------------------------<  105<H>  4.4   |  18  |  0.8    Ca    8.1<L>      26 Dec 2020 06:22                      Procalcitonin, Serum: 1.09 ng/mL (12-25-20 @ 06:34)  Procalcitonin, Serum: 0.31 ng/mL (12-24-20 @ 22:52)        12-26-20 @ 07:01  -  12-27-20 @ 07:00  --------------------------------------------------------  IN: 850 mL / OUT: 1600 mL / NET: -750 mL        MICROBIOLOGY:  Culture Results:   No growth to date. (12-25 @ 11:27)  Culture Results:   No growth to date. (12-24 @ 22:53)      MEDICATIONS  (STANDING):  aspirin  chewable 81 milliGRAM(s) Oral daily  atorvastatin 20 milliGRAM(s) Oral at bedtime  budesonide 160 MICROgram(s)/formoterol 4.5 MICROgram(s) Inhaler 2 Puff(s) Inhalation two times a day  cefepime   IVPB 1000 milliGRAM(s) IV Intermittent every 12 hours  clopidogrel Tablet 75 milliGRAM(s) Oral daily  dabigatran 150 milliGRAM(s) Oral every 12 hours  esMOLOL  Infusion 50 MICROgram(s)/kG/Min (24.8 mL/Hr) IV Continuous <Continuous>  gabapentin 300 milliGRAM(s) Oral three times a day  lamoTRIgine 100 milliGRAM(s) Oral daily  montelukast 10 milliGRAM(s) Oral daily  multivitamin 1 Tablet(s) Oral daily  pantoprazole    Tablet 40 milliGRAM(s) Oral before breakfast  QUEtiapine 200 milliGRAM(s) Oral at bedtime  sodium chloride 0.9%. 1000 milliLiter(s) (75 mL/Hr) IV Continuous <Continuous>  sodium chloride 0.9%. 1000 milliLiter(s) (50 mL/Hr) IV Continuous <Continuous>  tiotropium 18 MICROgram(s) Capsule 1 Capsule(s) Inhalation daily  vancomycin  IVPB 1000 milliGRAM(s) IV Intermittent every 24 hours    MEDICATIONS  (PRN):  acetaminophen   Tablet .. 650 milliGRAM(s) Oral every 6 hours PRN Mild Pain (1 - 3)  ALBUTerol    90 MICROgram(s) HFA Inhaler 1 Puff(s) Inhalation every 4 hours PRN Shortness of Breath  aluminum hydroxide/magnesium hydroxide/simethicone Suspension 30 milliLiter(s) Oral every 4 hours PRN Dyspepsia  guaifenesin/dextromethorphan  Syrup 10 milliLiter(s) Oral every 6 hours PRN Cough  LORazepam     Tablet 0.5 milliGRAM(s) Oral two times a day PRN Anxiety      RADIOLOGY & ADDITIONAL STUDIES:

## 2020-12-27 NOTE — PROGRESS NOTE ADULT - ASSESSMENT
Ms Henderson is a 74yo woman with medical history of COPD on 5L home oxygen, paroxysmal afib on pradaxa, carotid stenosis (L 80%, R 40%) s/p L carotid stenting recently , HFpEF (EF 70% Dec 2020), HTN, HLD, bipolar disorder, left parotid mass, recently admitted between 12/7-12/11 for COVID PNA s/p steroids/RDV presented to the ED with chest pain.       # Acute Chronic respiratory failure 2/2 likely due to COVID PNA with septic Shock   # Chest pain- likely pleuritic from COVID  requiring pressors initially now off  CXR - Increasing diffuse bilateral parenchymal opacities 12/25  Trops - x1  negative. Not volume overloaded, no CHF exacerbation.   ECHO (12/2/2020): Hyperdynamic global left ventricular systolic function; EF of 70 %; Moderately increased LV wall thickness. Grade I diastolic dysfunction.  Stress test 12/1/2020: No ischemic EKG changes  Increased O2 requirements - on HFNC and alternative Bipap  Continue home inhalers.  triple inhaler therapy  c/w Vancomycin and Cefepime  ID recs noted  repeat BCX NGTD  MRSA nares    #Paroxysmal atrial fibrillation w/ RVR   c/w Pradaxa 150mg PO twice daily and ASA 81 mg   Plavix recently added after a recent TIA last month.   started on Esmolol drip to control rate      #JOYCE likely pre renal - resolved  baseline: 0.8-1.1  admission Cr 1.6 --> 1.0 (12/23)      # Left ICA s/p TCAR (transcarotid artery revascularization) 12/04 with TIA last admission  c/w plavix, pradaxa, ASA   Code stroke on 12/05 for aphasia - workup was negative for acute stroke    #Left intra parotid mass   - needs outpt biopsy     # Chronic HFpEF  # HTN  #HLD  /65  Preserved EF according to Dr. Bishop's notes, although no echo report in [place].   ECHO (12/2/2020): Hyperdynamic global left ventricular systolic function; EF of 70 %; Moderately increased LV wall thickness. Grade I diastolic dysfunction.  c/w Atorvastatin  will hold metoprolol and amlodipine for now - restart if persistently elevated BP    #Bipolar Disorder  Mood stable  Continue home meds Lamictal 100mg PO daily, Seroquel 200mg at bedtime, and Ativan 0.5mg PO twice daily PRN      Dispo: Home  Code: FULL

## 2020-12-27 NOTE — CHART NOTE - NSCHARTNOTEFT_GEN_A_CORE
1. Tachycardia  - Ms. Henderson has a history of paroxysmal atrial fibrillation and is currently on PO Lopressor 100mg BID, Digoxin 0.25mg QD and Pradexa 150mg BID.  - Her heart rate went up to 160's bpm at 05:30 AM  - Other vital signs: /109 mmHg and SaO2 96 % on CPAP    - Plan:  --> STAT ECG was performed revealing atrial fibrillation with RVR:  bpm, QTc 454 ms  --> Will administer IV Lopressor 5mg x1 dose STAT since BP is 163/109 mmHg and tolerates  --> In case HR doesn't improve, will consider administering another IV dose of Lopressor if BP tolerates  --> Will monitor HR closely 1. Tachycardia  - Ms. Henderson has a history of paroxysmal atrial fibrillation and is currently on PO Lopressor 100mg BID, Digoxin 0.25mg QD and Pradexa 150mg BID.  - Her heart rate went up to 160's bpm at 05:30 AM  - Other vital signs: /109 mmHg and SaO2 96 % on CPAP  - On bedside exam: patient anxious and agitated, trying to pull off the CPAP      - Plan:  --> STAT ECG was performed revealing atrial fibrillation with RVR:  bpm, QTc 454 ms  --> Will administer IV Lopressor 5mg x1 dose STAT since BP is 163/109 mmHg and tolerates  --> In case HR doesn't improve, will consider administering another IV dose of Lopressor if BP tolerates  --> Since patient is agitated, will also administer IV Ativan 2mg x1 dose STAT  --> Will monitor HR closely

## 2020-12-27 NOTE — PROGRESS NOTE ADULT - ASSESSMENT
IMPRESSION:    Sepsis present on admission/ staph capitis  UTI/ prior E Coli in urine  severe Covid pneumonia worsening spiking T  P A fib rapid  COPD on home oxygen  ? stroke         PLAN:    CNS: Neuro eval ( head CT neg)    HEENT:  Oral care    PULMONARY:  HOB @ 45 degrees, BIPAP at night, NC during day keep Sao2 92 to 96%, decadron     CARDIOVASCULAR: keep equal balance, cardio f/up, iv lasix, cardizem drip    GI: GI prophylaxis                                          Feeding NPO    RENAL:  F/u  lytes.  Correct as needed. accurate I/O    INFECTIOUS DISEASE: f/up cx, cefepime/ vanc till cx, f/up infl markers, ID f/up, repeat cx    HEMATOLOGICAL:  DVT prophylaxis. on pradaxa    ENDOCRINE:  Follow up FS.  Insulin protocol if needed.    MUSCULOSKELETAL: bedrest      DISPOSITION: ED3  poor prognosis  advance directives

## 2020-12-28 LAB
ANION GAP SERPL CALC-SCNC: 9 MMOL/L — SIGNIFICANT CHANGE UP (ref 7–14)
BASOPHILS # BLD AUTO: 0.01 K/UL — SIGNIFICANT CHANGE UP (ref 0–0.2)
BASOPHILS NFR BLD AUTO: 0.3 % — SIGNIFICANT CHANGE UP (ref 0–1)
BLD GP AB SCN SERPL QL: SIGNIFICANT CHANGE UP
BUN SERPL-MCNC: 13 MG/DL — SIGNIFICANT CHANGE UP (ref 10–20)
CALCIUM SERPL-MCNC: 7.8 MG/DL — LOW (ref 8.5–10.1)
CHLORIDE SERPL-SCNC: 114 MMOL/L — HIGH (ref 98–110)
CO2 SERPL-SCNC: 20 MMOL/L — SIGNIFICANT CHANGE UP (ref 17–32)
CREAT SERPL-MCNC: 0.7 MG/DL — SIGNIFICANT CHANGE UP (ref 0.7–1.5)
CULTURE RESULTS: SIGNIFICANT CHANGE UP
EOSINOPHIL # BLD AUTO: 0.24 K/UL — SIGNIFICANT CHANGE UP (ref 0–0.7)
EOSINOPHIL NFR BLD AUTO: 7.1 % — SIGNIFICANT CHANGE UP (ref 0–8)
GAS PNL BLDA: SIGNIFICANT CHANGE UP
GLUCOSE SERPL-MCNC: 103 MG/DL — HIGH (ref 70–99)
HCT VFR BLD CALC: 26.3 % — LOW (ref 37–47)
HGB BLD-MCNC: 8.1 G/DL — LOW (ref 12–16)
IMM GRANULOCYTES NFR BLD AUTO: 0.6 % — HIGH (ref 0.1–0.3)
LYMPHOCYTES # BLD AUTO: 0.55 K/UL — LOW (ref 1.2–3.4)
LYMPHOCYTES # BLD AUTO: 16.3 % — LOW (ref 20.5–51.1)
MCHC RBC-ENTMCNC: 30.3 PG — SIGNIFICANT CHANGE UP (ref 27–31)
MCHC RBC-ENTMCNC: 30.8 G/DL — LOW (ref 32–37)
MCV RBC AUTO: 98.5 FL — SIGNIFICANT CHANGE UP (ref 81–99)
MONOCYTES # BLD AUTO: 0.2 K/UL — SIGNIFICANT CHANGE UP (ref 0.1–0.6)
MONOCYTES NFR BLD AUTO: 5.9 % — SIGNIFICANT CHANGE UP (ref 1.7–9.3)
MRSA PCR RESULT.: NEGATIVE — SIGNIFICANT CHANGE UP
NEUTROPHILS # BLD AUTO: 2.36 K/UL — SIGNIFICANT CHANGE UP (ref 1.4–6.5)
NEUTROPHILS NFR BLD AUTO: 69.8 % — SIGNIFICANT CHANGE UP (ref 42.2–75.2)
NRBC # BLD: 0 /100 WBCS — SIGNIFICANT CHANGE UP (ref 0–0)
PLATELET # BLD AUTO: 145 K/UL — SIGNIFICANT CHANGE UP (ref 130–400)
POTASSIUM SERPL-MCNC: 4.1 MMOL/L — SIGNIFICANT CHANGE UP (ref 3.5–5)
POTASSIUM SERPL-SCNC: 4.1 MMOL/L — SIGNIFICANT CHANGE UP (ref 3.5–5)
RBC # BLD: 2.67 M/UL — LOW (ref 4.2–5.4)
RBC # FLD: 14 % — SIGNIFICANT CHANGE UP (ref 11.5–14.5)
SODIUM SERPL-SCNC: 143 MMOL/L — SIGNIFICANT CHANGE UP (ref 135–146)
SPECIMEN SOURCE: SIGNIFICANT CHANGE UP
VANCOMYCIN TROUGH SERPL-MCNC: 11.4 UG/ML — HIGH (ref 5–10)
WBC # BLD: 3.38 K/UL — LOW (ref 4.8–10.8)
WBC # FLD AUTO: 3.38 K/UL — LOW (ref 4.8–10.8)

## 2020-12-28 PROCEDURE — 71045 X-RAY EXAM CHEST 1 VIEW: CPT | Mod: 26

## 2020-12-28 PROCEDURE — 99233 SBSQ HOSP IP/OBS HIGH 50: CPT | Mod: CS

## 2020-12-28 RX ORDER — METOPROLOL TARTRATE 50 MG
100 TABLET ORAL EVERY 12 HOURS
Refills: 0 | Status: DISCONTINUED | OUTPATIENT
Start: 2020-12-28 | End: 2021-01-09

## 2020-12-28 RX ORDER — METOPROLOL TARTRATE 50 MG
25 TABLET ORAL ONCE
Refills: 0 | Status: COMPLETED | OUTPATIENT
Start: 2020-12-28 | End: 2020-12-28

## 2020-12-28 RX ORDER — METOPROLOL TARTRATE 50 MG
5 TABLET ORAL ONCE
Refills: 0 | Status: COMPLETED | OUTPATIENT
Start: 2020-12-28 | End: 2020-12-28

## 2020-12-28 RX ADMIN — CEFEPIME 100 MILLIGRAM(S): 1 INJECTION, POWDER, FOR SOLUTION INTRAMUSCULAR; INTRAVENOUS at 05:33

## 2020-12-28 RX ADMIN — Medication 250 MILLIGRAM(S): at 23:53

## 2020-12-28 RX ADMIN — GABAPENTIN 300 MILLIGRAM(S): 400 CAPSULE ORAL at 21:31

## 2020-12-28 RX ADMIN — Medication 1 MILLIGRAM(S): at 12:27

## 2020-12-28 RX ADMIN — ATORVASTATIN CALCIUM 20 MILLIGRAM(S): 80 TABLET, FILM COATED ORAL at 21:31

## 2020-12-28 RX ADMIN — Medication 25 MILLIGRAM(S): at 12:49

## 2020-12-28 RX ADMIN — GABAPENTIN 300 MILLIGRAM(S): 400 CAPSULE ORAL at 05:33

## 2020-12-28 RX ADMIN — BUDESONIDE AND FORMOTEROL FUMARATE DIHYDRATE 2 PUFF(S): 160; 4.5 AEROSOL RESPIRATORY (INHALATION) at 08:04

## 2020-12-28 RX ADMIN — MONTELUKAST 10 MILLIGRAM(S): 4 TABLET, CHEWABLE ORAL at 12:49

## 2020-12-28 RX ADMIN — CLOPIDOGREL BISULFATE 75 MILLIGRAM(S): 75 TABLET, FILM COATED ORAL at 12:48

## 2020-12-28 RX ADMIN — Medication 1 TABLET(S): at 12:48

## 2020-12-28 RX ADMIN — DABIGATRAN ETEXILATE MESYLATE 150 MILLIGRAM(S): 150 CAPSULE ORAL at 23:53

## 2020-12-28 RX ADMIN — DABIGATRAN ETEXILATE MESYLATE 150 MILLIGRAM(S): 150 CAPSULE ORAL at 12:48

## 2020-12-28 RX ADMIN — Medication 10 MILLILITER(S): at 21:48

## 2020-12-28 RX ADMIN — LAMOTRIGINE 100 MILLIGRAM(S): 25 TABLET, ORALLY DISINTEGRATING ORAL at 12:48

## 2020-12-28 RX ADMIN — Medication 100 MILLIGRAM(S): at 21:33

## 2020-12-28 RX ADMIN — PANTOPRAZOLE SODIUM 40 MILLIGRAM(S): 20 TABLET, DELAYED RELEASE ORAL at 05:33

## 2020-12-28 RX ADMIN — Medication 81 MILLIGRAM(S): at 12:49

## 2020-12-28 RX ADMIN — QUETIAPINE FUMARATE 200 MILLIGRAM(S): 200 TABLET, FILM COATED ORAL at 21:31

## 2020-12-28 RX ADMIN — GABAPENTIN 300 MILLIGRAM(S): 400 CAPSULE ORAL at 13:03

## 2020-12-28 RX ADMIN — Medication 5 MILLIGRAM(S): at 21:23

## 2020-12-28 RX ADMIN — CEFEPIME 100 MILLIGRAM(S): 1 INJECTION, POWDER, FOR SOLUTION INTRAMUSCULAR; INTRAVENOUS at 17:52

## 2020-12-28 RX ADMIN — Medication 10 MILLILITER(S): at 08:09

## 2020-12-28 RX ADMIN — SODIUM CHLORIDE 50 MILLILITER(S): 9 INJECTION INTRAMUSCULAR; INTRAVENOUS; SUBCUTANEOUS at 18:54

## 2020-12-28 RX ADMIN — Medication 1 MILLIGRAM(S): at 10:33

## 2020-12-28 RX ADMIN — BUDESONIDE AND FORMOTEROL FUMARATE DIHYDRATE 2 PUFF(S): 160; 4.5 AEROSOL RESPIRATORY (INHALATION) at 21:33

## 2020-12-28 NOTE — PROGRESS NOTE ADULT - ATTENDING COMMENTS
Ms Henderson is a 74yo woman with medical history of COPD on 5L home oxygen, paroxysmal afib on pradaxa, carotid stenosis (L 80%, R 40%) s/p L carotid stenting recently , HFpEF (EF 70% Dec 2020), HTN, HLD, bipolar disorder, left parotid mass, recently admitted between 12/7-12/11 for COVID PNA s/p steroids/RDV presented to the ED with chest pain.       # Acute Chronic respiratory failure 2/2 likely due to COVID PNA with septic Shock   # Chest pain- likely pleuritic from COVID  requiring pressors initially now off  CXR - Increasing diffuse bilateral parenchymal opacities 12/25  Trops - x1  negative. Not volume overloaded, no CHF exacerbation.   ECHO (12/2/2020): Hyperdynamic global left ventricular systolic function; EF of 70 %; Moderately increased LV wall thickness. Grade I diastolic dysfunction.  Stress test 12/1/2020: No ischemic EKG changes  Increased O2 requirements - on HFNC and alternative Bipap  Titrate O2 as tolerated  Continue home inhalers.  triple inhaler therapy  c/w Vancomycin and Cefepime  ID recs noted  repeat BCX NGTD  MRSA nares    #Paroxysmal atrial fibrillation w/ RVR   c/w Pradaxa 150mg PO twice daily and ASA 81 mg   Plavix recently added after a recent TIA last month.   titrate off Esmolol drip and switch to PO BB      #JOYCE likely pre renal - resolved  baseline: 0.8-1.1  admission Cr 1.6 --> 1.0 (12/23)      # Left ICA s/p TCAR (transcarotid artery revascularization) 12/04 with TIA last admission  c/w plavix, pradaxa, ASA   Code stroke on 12/05 for aphasia - workup was negative for acute stroke    #Left intra parotid mass   needs outpt biopsy     # Chronic HFpEF  # HTN  #HLD  /65  Preserved EF according to Dr. Bishop's notes, although no echo report in [place].   ECHO (12/2/2020): Hyperdynamic global left ventricular systolic function; EF of 70 %; Moderately increased LV wall thickness. Grade I diastolic dysfunction.  c/w Atorvastatin  will hold metoprolol and amlodipine for now - restart if persistently elevated BP    #Bipolar Disorder  Mood stable  Continue home meds Lamictal 100mg PO daily, Seroquel 200mg at bedtime, and Ativan 0.5mg PO twice daily PRN      Dispo: Home  Code: FULL     I saw and evaluated the patient on the above date of service.    I agree with the above history, physical, and plan which I have reviewed and edited where appropriate.

## 2020-12-28 NOTE — PROGRESS NOTE ADULT - ASSESSMENT
ASSESSMENT  75y F with COPD on 5L home O2, paroxysmal afib, carotid stenosis, HFpEF, HTN, HLD, Bipolar disorder, recent admission D/C 12/11 s/p left TCAR, right groin access  found to be COVID-19 PNA s/p RDV, plasma now admitted with CP     IMPRESSION  #Severe COVID-19 PNA with septic shock requiring pressors     Procalcitonin, Serum: 0.84 ng/mL (12-22-20 @ 21:56)    CXR bilateral opacities , RLL opacity    UA unremarkable UCX NG    COVID-19 PCR: Detected (12-07-20 @ 12:30)  #CoNS bacteremia , contaminant     Repeat BCX NG  #JOYCE  #Lactic acidosis  #Obesity BMI (kg/m2): 32.3, 34      RECOMMENDATIONS  - D/C VANC  - Repeat inflammatory markers  - cefepime   IVPB 1000 milliGRAM(s) IV Intermittent every 12 hours, empiric 7 days 12/23, end 12/29   - sputum cx if possible  - repeat inflammatory markers 12/25  - check MRSA nares    If any questions, please call or send a message on [employer]  Spectra [phone]

## 2020-12-28 NOTE — PROGRESS NOTE ADULT - SUBJECTIVE AND OBJECTIVE BOX
SHAUN COATES  75y, Female  Allergy: codeine (Other (Moderate))  Depakote (Unknown)  Dilaudid (Short breath; Rash)  IV Contrast (Anaphylaxis)  losartan (Angioedema)  Risperdal (Other)  verapamil (Short breath; Angioedema)      LOS  5d    CHIEF COMPLAINT: chest pain weakness (27 Dec 2020 19:39)      INTERVAL EVENTS/HPI  - HFNC  - T(F): , Max: 98.7 (12-27-20 @ 17:18)  - WBC Count: 4.54 (12-27-20 @ 12:03)  WBC Count: 5.36 (12-26-20 @ 06:22)  - Creatinine, Serum: 0.7 (12-28-20 @ 05:55)  Creatinine, Serum: 0.8 (12-27-20 @ 12:03)           VITALS:  T(F): 98.6, Max: 98.7 (12-27-20 @ 17:18)  HR: 92  BP: 119/59  RR: 18Vital Signs Last 24 Hrs  T(C): 37 (27 Dec 2020 23:48), Max: 37.1 (27 Dec 2020 17:18)  T(F): 98.6 (27 Dec 2020 23:48), Max: 98.7 (27 Dec 2020 17:18)  HR: 92 (27 Dec 2020 23:48) (84 - 130)  BP: 119/59 (27 Dec 2020 23:48) (119/59 - 178/79)  BP(mean): --  RR: 18 (27 Dec 2020 23:48) (18 - 18)  SpO2: 98% (27 Dec 2020 23:48) (94% - 98%)    FH: Non-contributory  Social Hx: Non-contributory    TESTS & MEASUREMENTS:                        9.4    4.54  )-----------( 141      ( 27 Dec 2020 12:03 )             31.1     12-28    143  |  114<H>  |  13  ----------------------------<  103<H>  4.1   |  20  |  0.7    Ca    7.8<L>      28 Dec 2020 05:55      eGFR if Non African American: 86 mL/min/1.73M2 (12-28-20 @ 05:55)  eGFR if African American: 100 mL/min/1.73M2 (12-28-20 @ 05:55)  eGFR if Non African American: 73 mL/min/1.73M2 (12-27-20 @ 12:03)  eGFR if African American: 85 mL/min/1.73M2 (12-27-20 @ 12:03)          Culture - Blood (collected 12-25-20 @ 11:27)  Source: .Blood None  Preliminary Report (12-26-20 @ 16:01):    No growth to date.    Culture - Blood (collected 12-24-20 @ 22:53)  Source: .Blood Blood-Venous  Preliminary Report (12-26-20 @ 13:01):    No growth to date.    Culture - Urine (collected 12-23-20 @ 00:07)  Source: .Urine Clean Catch (Midstream)  Final Report (12-24-20 @ 02:06):    <10,000 CFU/mL Normal Urogenital Beronica    Culture - Blood (collected 12-22-20 @ 21:53)  Source: .Blood Blood-Peripheral  Gram Stain (12-24-20 @ 03:50):    Growth in aerobic bottle: Gram Positive Cocci in Clusters  Final Report (12-25-20 @ 09:40):    Growth in anaerobic bottle: Staphylococcus capitis    Coag Negative Staphylococcus    Single set isolate, possible contaminant. Contact    Microbiology if susceptibility testing clinically    indicated.    ***Blood Panel PCR results on this specimen are available    approximately 3 hours after the Gram stain result.***    Gram stain, PCR, and/or culture results may not always    correspond due to difference in methodologies.    ************************************************************    This PCR assay was performed using [employer].    The following targets are tested for: Enterococcus,    vancomycin resistant enterococci, Listeria monocytogenes,    coagulase negative staphylococci, S. aureus,    methicillin resistant S. aureus, Streptococcus agalactiae    (Group B), S. pneumoniae, S. pyogenes (Group A),    Acinetobacter baumannii, Enterobacter cloacae, E. coli,    Klebsiella oxytoca, K. pneumoniae, Proteus sp.,    Serratia marcescens, Haemophilus influenzae,    Neisseria meningitidis, Pseudomonas aeruginosa, Candida    albicans, C. glabrata, C krusei, C parapsilosis,    C. tropicalis and the KPC resistance gene.    "Due to technical problems, Proteus sp. and Pseudomonas    aeruginosa will Not be reported as part of the BCID panel    until further notice".  Organism: Blood Culture PCR (12-25-20 @ 09:40)  Organism: Blood Culture PCR (12-25-20 @ 09:40)      -  Coagulase negative Staphylococcus: Detec      Method Type: PCR    Culture - Blood (collected 12-22-20 @ 21:53)  Source: .Blood Blood-Peripheral  Final Report (12-28-20 @ 07:00):    No Growth Final    Culture - Blood (collected 12-07-20 @ 11:28)  Source: .Blood Blood-Peripheral  Final Report (12-12-20 @ 23:01):    No Growth Final            INFECTIOUS DISEASES TESTING  Procalcitonin, Serum: 1.09 (12-25-20 @ 06:34)  Procalcitonin, Serum: 0.31 (12-24-20 @ 22:52)  COVID-19 PCR: Detected (12-23-20 @ 04:50)  Procalcitonin, Serum: 0.84 (12-22-20 @ 21:56)  Procalcitonin, Serum: 0.10 (12-11-20 @ 08:14)  Vancomycin Level, Trough: 16.2 (12-08-20 @ 16:29)  COVID-19 PCR: Detected (12-07-20 @ 12:30)  Procalcitonin, Serum: 0.09 (12-07-20 @ 10:05)  Procalcitonin, Serum: 0.09 (11-29-20 @ 21:30)  COVID-19 PCR: NotDetec (11-29-20 @ 13:30)  Procalcitonin, Serum: 0.10 (11-29-20 @ 13:15)  Rapid RVP Result: NotDetec (10-14-20 @ 18:06)      INFLAMMATORY MARKERS  C-Reactive Protein, Serum: 9.57 mg/dL (12-23-20 @ 07:33)  C-Reactive Protein, Serum: 2.39 mg/dL (12-11-20 @ 08:14)  C-Reactive Protein, Serum: 6.71 mg/dL (12-09-20 @ 05:37)  C-Reactive Protein, Serum: 8.14 mg/dL (12-09-20 @ 02:00)      RADIOLOGY & ADDITIONAL TESTS:  I have personally reviewed the last available Chest xray  CXR      CT      CARDIOLOGY TESTING  12 Lead ECG:   Ventricular Rate 176 BPM    Atrial Rate 182 BPM    QRS Duration 84 ms    Q-T Interval 264 ms    QTC Calculation(Bazett) 451 ms    R Axis 25 degrees    T Axis 212 degrees    Diagnosis Line Atrial fibrillation with rapid ventricular response  Marked ST abnormality, possible inferior subendocardial injury  Abnormal ECG    Confirmed by cSott Cowan ([id number]) on 12/27/2020 12:59:53 PM (12-27-20 @ 05:38)  12 Lead ECG:   Ventricular Rate 156 BPM    Atrial Rate 187 BPM    QRS Duration 86 ms    Q-T Interval 246 ms    QTC Calculation(Bazett) 396 ms    R Axis 48 degrees    T Axis 246 degrees    Diagnosis Line Atrial fibrillation with rapid ventricular response with premature ventricular  or aberrantly conducted complexes  Marked ST abnormality, possible inferior subendocardial injury  Abnormal ECG    Confirmed by Scott Cowan ([id number]) on 12/26/2020 7:08:04 AM (12-25-20 @ 03:41)      MEDICATIONS  aspirin  chewable 81 Oral daily  atorvastatin 20 Oral at bedtime  budesonide 160 MICROgram(s)/formoterol 4.5 MICROgram(s) Inhaler 2 Inhalation two times a day  cefepime   IVPB 1000 IV Intermittent every 12 hours  clopidogrel Tablet 75 Oral daily  dabigatran 150 Oral every 12 hours  esMOLOL  Infusion 50.445 IV Continuous <Continuous>  gabapentin 300 Oral three times a day  lamoTRIgine 100 Oral daily  montelukast 10 Oral daily  multivitamin 1 Oral daily  pantoprazole    Tablet 40 Oral before breakfast  QUEtiapine 200 Oral at bedtime  sodium chloride 0.9%. 1000 IV Continuous <Continuous>  sodium chloride 0.9%. 1000 IV Continuous <Continuous>  tiotropium 18 MICROgram(s) Capsule 1 Inhalation daily  vancomycin  IVPB 1000 IV Intermittent every 24 hours      WEIGHT  Weight (kg): 82.599 (12-23-20 @ 04:25)  Creatinine, Serum: 0.7 mg/dL (12-28-20 @ 05:55)  Creatinine, Serum: 0.8 mg/dL (12-27-20 @ 12:03)      ANTIBIOTICS:  cefepime   IVPB 1000 milliGRAM(s) IV Intermittent every 12 hours  vancomycin  IVPB 1000 milliGRAM(s) IV Intermittent every 24 hours      All available historical records have been reviewed

## 2020-12-28 NOTE — PROGRESS NOTE ADULT - SUBJECTIVE AND OBJECTIVE BOX
SUBJECTIVE:    Patient is a 75y old Female who presents with a chief complaint of chest pain weakness (28 Dec 2020 07:32)  Currently admitted to medicine with the primary diagnosis of Septic shock due to COVID-19  Today is hospital day 5d. This morning she seen on NFNC, lethargic, but able to speak. Pt on esmolol drip.    PAST MEDICAL & SURGICAL HISTORY  Falls  Congestive heart failure  Transient ischemic attack  FLAVIO on CPAP  Bipolar 1 disorder  Allergic reaction  PVD (peripheral vascular disease)  Other emphysema  Other cardiomyopathy  TIA (transient ischemic attack)  COPD (chronic obstructive pulmonary disease)  Depression  Hypertension  History of cholecystectomy    ALLERGIES:  codeine (Other (Moderate))  Depakote (Unknown)  Dilaudid (Short breath; Rash)  IV Contrast (Anaphylaxis)  losartan (Angioedema)  Risperdal (Other)  verapamil (Short breath; Angioedema)    MEDICATIONS:  STANDING MEDICATIONS  aspirin  chewable 81 milliGRAM(s) Oral daily  atorvastatin 20 milliGRAM(s) Oral at bedtime  budesonide 160 MICROgram(s)/formoterol 4.5 MICROgram(s) Inhaler 2 Puff(s) Inhalation two times a day  cefepime   IVPB 1000 milliGRAM(s) IV Intermittent every 12 hours  clopidogrel Tablet 75 milliGRAM(s) Oral daily  dabigatran 150 milliGRAM(s) Oral every 12 hours  esMOLOL  Infusion 50.445 MICROgram(s)/kG/Min IV Continuous <Continuous>  gabapentin 300 milliGRAM(s) Oral three times a day  lamoTRIgine 100 milliGRAM(s) Oral daily  montelukast 10 milliGRAM(s) Oral daily  multivitamin 1 Tablet(s) Oral daily  pantoprazole    Tablet 40 milliGRAM(s) Oral before breakfast  QUEtiapine 200 milliGRAM(s) Oral at bedtime  sodium chloride 0.9%. 1000 milliLiter(s) IV Continuous <Continuous>  sodium chloride 0.9%. 1000 milliLiter(s) IV Continuous <Continuous>  tiotropium 18 MICROgram(s) Capsule 1 Capsule(s) Inhalation daily  vancomycin  IVPB 1000 milliGRAM(s) IV Intermittent every 24 hours    PRN MEDICATIONS  acetaminophen   Tablet .. 650 milliGRAM(s) Oral every 6 hours PRN  ALBUTerol    90 MICROgram(s) HFA Inhaler 1 Puff(s) Inhalation every 4 hours PRN  aluminum hydroxide/magnesium hydroxide/simethicone Suspension 30 milliLiter(s) Oral every 4 hours PRN  guaifenesin/dextromethorphan  Syrup 10 milliLiter(s) Oral every 6 hours PRN  LORazepam     Tablet 0.5 milliGRAM(s) Oral two times a day PRN    VITALS:   T(F): 98.4  HR: 92  BP: 127/60  RR: 31  SpO2: 96%    LABS:                        8.1    3.38  )-----------( 145      ( 28 Dec 2020 05:55 )             26.3     12-28    143  |  114<H>  |  13  ----------------------------<  103<H>  4.1   |  20  |  0.7    Ca    7.8<L>      28 Dec 2020 05:55    Culture - Blood (collected 25 Dec 2020 11:27)  Source: .Blood None  Preliminary Report (26 Dec 2020 16:01):    No growth to date.      RADIOLOGY:    < from: CT Head No Cont (12.26.20 @ 06:38) >    EXAM:  CT BRAIN            PROCEDURE DATE:  12/26/2020            INTERPRETATION:  CLINICAL HISTORY/REASON FOR EXAM: Weakness. Transient ischemic attack. Clinical concern for stroke.    TECHNIQUE: Multiple contiguous axial CT images of the head were obtained from the base of the skull to the vertex without administration of intravenous contrast. Sagittal, coronal and axial reformatted images were also obtained.    COMPARISON: CT head dated 12/5/2020.    FINDINGS:    The ventricular, basal cisternal and sulcal patterns are appropriate for patient's stated age.    No evidence of acute intracranial hemorrhage, large territorial infarct, or significant space-occupying lesion. No extra-axial collections. The craniocervical junction is unremarkable.    No depressed calvarial fracture. Imaged portions of the paranasal sinuses are clear.      IMPRESSION:    No CT evidence of acute territorial infarct or other acute intracranial pathology.            CHELE LANE M.D., RESIDENT RADIOLOGIST  This document has been electronically signed.  APURVA RAY M.D., ATTENDING RADIOLOGIST  This document has been electronically signed. Dec 26 2020  6:52AM    < end of copied text >      PHYSICAL EXAM:  GEN: moderate resp distress  HEENT: on HFNC  LUNGS: decrease BS  HEART: irregular, rate varies but is sometimes >100  ABD: Soft, non-tender, non-distended.   EXT: edema UE/LE  NEURO: awake, lethargic    Intravenous access:   NG tube:   Freeman cathter: Indwelling Urethral Catheter:     Connect To:  Straight Drainage/Gravity    Indication:  Urine Output Monitoring in Critically Ill (12-27-20 @ 23:20) (not performed) [Active]  Indwelling Urethral Catheter:     Connect To:  Straight Drainage/Gravity    Indication:  Urine Output Monitoring in Critically Ill (12-22-20 @ 23:39) (not performed) [Active]         SUBJECTIVE:    Patient is a 75y old Female who presents with a chief complaint of chest pain weakness (28 Dec 2020 07:32)  Currently admitted to medicine with the primary diagnosis of Septic shock due to COVID-19  Today is hospital day 5d. This morning she seen on NFNC, lethargic, but able to speak. Pt on esmolol drip.    Patient eventually switched to BiPAP for tachypnea     PAST MEDICAL & SURGICAL HISTORY  Falls  Congestive heart failure  Transient ischemic attack  FLAVIO on CPAP  Bipolar 1 disorder  Allergic reaction  PVD (peripheral vascular disease)  Other emphysema  Other cardiomyopathy  TIA (transient ischemic attack)  COPD (chronic obstructive pulmonary disease)  Depression  Hypertension  History of cholecystectomy    ALLERGIES:  codeine (Other (Moderate))  Depakote (Unknown)  Dilaudid (Short breath; Rash)  IV Contrast (Anaphylaxis)  losartan (Angioedema)  Risperdal (Other)  verapamil (Short breath; Angioedema)    MEDICATIONS:  STANDING MEDICATIONS  aspirin  chewable 81 milliGRAM(s) Oral daily  atorvastatin 20 milliGRAM(s) Oral at bedtime  budesonide 160 MICROgram(s)/formoterol 4.5 MICROgram(s) Inhaler 2 Puff(s) Inhalation two times a day  cefepime   IVPB 1000 milliGRAM(s) IV Intermittent every 12 hours  clopidogrel Tablet 75 milliGRAM(s) Oral daily  dabigatran 150 milliGRAM(s) Oral every 12 hours  esMOLOL  Infusion 50.445 MICROgram(s)/kG/Min IV Continuous <Continuous>  gabapentin 300 milliGRAM(s) Oral three times a day  lamoTRIgine 100 milliGRAM(s) Oral daily  montelukast 10 milliGRAM(s) Oral daily  multivitamin 1 Tablet(s) Oral daily  pantoprazole    Tablet 40 milliGRAM(s) Oral before breakfast  QUEtiapine 200 milliGRAM(s) Oral at bedtime  sodium chloride 0.9%. 1000 milliLiter(s) IV Continuous <Continuous>  sodium chloride 0.9%. 1000 milliLiter(s) IV Continuous <Continuous>  tiotropium 18 MICROgram(s) Capsule 1 Capsule(s) Inhalation daily  vancomycin  IVPB 1000 milliGRAM(s) IV Intermittent every 24 hours    PRN MEDICATIONS  acetaminophen   Tablet .. 650 milliGRAM(s) Oral every 6 hours PRN  ALBUTerol    90 MICROgram(s) HFA Inhaler 1 Puff(s) Inhalation every 4 hours PRN  aluminum hydroxide/magnesium hydroxide/simethicone Suspension 30 milliLiter(s) Oral every 4 hours PRN  guaifenesin/dextromethorphan  Syrup 10 milliLiter(s) Oral every 6 hours PRN  LORazepam     Tablet 0.5 milliGRAM(s) Oral two times a day PRN    VITALS:   T(F): 98.4  HR: 92  BP: 127/60  RR: 31  SpO2: 96%    LABS:                        8.1    3.38  )-----------( 145      ( 28 Dec 2020 05:55 )             26.3     12-28    143  |  114<H>  |  13  ----------------------------<  103<H>  4.1   |  20  |  0.7    Ca    7.8<L>      28 Dec 2020 05:55    Culture - Blood (collected 25 Dec 2020 11:27)  Source: .Blood None  Preliminary Report (26 Dec 2020 16:01):    No growth to date.      RADIOLOGY:    < from: CT Head No Cont (12.26.20 @ 06:38) >    EXAM:  CT BRAIN            PROCEDURE DATE:  12/26/2020            INTERPRETATION:  CLINICAL HISTORY/REASON FOR EXAM: Weakness. Transient ischemic attack. Clinical concern for stroke.    TECHNIQUE: Multiple contiguous axial CT images of the head were obtained from the base of the skull to the vertex without administration of intravenous contrast. Sagittal, coronal and axial reformatted images were also obtained.    COMPARISON: CT head dated 12/5/2020.    FINDINGS:    The ventricular, basal cisternal and sulcal patterns are appropriate for patient's stated age.    No evidence of acute intracranial hemorrhage, large territorial infarct, or significant space-occupying lesion. No extra-axial collections. The craniocervical junction is unremarkable.    No depressed calvarial fracture. Imaged portions of the paranasal sinuses are clear.      IMPRESSION:    No CT evidence of acute territorial infarct or other acute intracranial pathology.            CHELE LANE M.D., RESIDENT RADIOLOGIST  This document has been electronically signed.  APURVA RAY M.D., ATTENDING RADIOLOGIST  This document has been electronically signed. Dec 26 2020  6:52AM    < end of copied text >      PHYSICAL EXAM:  GEN: moderate resp distress  HEENT: on HFNC  LUNGS: decrease BS  HEART: irregular, rate varies but is sometimes >100  ABD: Soft, non-tender, non-distended.   EXT: edema UE/LE  NEURO: awake, lethargic    Intravenous access:   NG tube:   Freeman cathter: Indwelling Urethral Catheter:     Connect To:  Straight Drainage/Gravity    Indication:  Urine Output Monitoring in Critically Ill (12-27-20 @ 23:20) (not performed) [Active]  Indwelling Urethral Catheter:     Connect To:  Straight Drainage/Gravity    Indication:  Urine Output Monitoring in Critically Ill (12-22-20 @ 23:39) (not performed) [Active]

## 2020-12-28 NOTE — PROGRESS NOTE ADULT - ASSESSMENT
75 F PMH COPD (requiring 5L home O2), A-fib (on pradaxa), HFpEF, HTN, DLD, Bipolar presents to ED for chest pain. Patient had COVID-19 pneumonia and was inpt from 12-7-12-11. In ED was in septic shock with JOYCE.     IMPRESSION  COVID-19  UTI  Sepsis on Admission  A-fib with RVR    #UTI with septic shock- UA + nitrite, LE + SBP<90,lactate: 3 s/p 3L NS in ED  - monitor BP, MAP >60  - s/p cefepime and levaquin in ED  - c/w cefepime  > tylenol prn for fever    #Acute on Chronic respiratory failure 2/2 COPD on home O2  - Continue home inhalers.  - triple inhaler therapy  - on HFNC currently, titrate sat to <92%    # Paroxysmal atrial fibrillation- in RVR, possibly triggered by hypoxia vs sepsis   - On Pradaxa 150mg PO twice daily and ASA 81 mg   - Plavix recently added after a recent TIA last month.   - c/w esmolol infusion  - avoid positive chronotropes    #Chest pain- most likely pleuritic (Dr. Hoffmann Cardiologist) vs demand ischemia pain  - Trops - x1  negative. Not volume overloaded, no CHF exacerbation.   - CXR - Stable bilateral opacities. Low lung volumes with elevated right hemidiaphragm. No pneumothorax.  - ECHO (12/2/2020): Hyperdynamic global left ventricular systolic function; EF of 70 %; Moderately increased LV wall thickness. Grade I diastolic dysfunction.  - Sctress test 12/1/2020: No ischemic EKG changes  - CT chest( 12/2/2020): left upper lobe anterior bulla/focus of paraseptal emphysema now measuring 6.6 x 6.0 cm. No significant left lower lobe consolidation or left pleural effusion. peripheral predominant opacities    #JOYCE on admission-resolved  #recent hx COVID 19 Pneumonia- s/p RDV/2u plasma/Steroids  - Isolation Precaution   # Left ICA s/p TCAR (transcarotid artery revascularization) 12/04 with TIA last admission  - Continue plavix, pradaxa, ASA  #Left intra parotid mass- outpt biopsy     # Chronic HFpEF  # HTN  # HLD  - Preserved EF according to Dr. Bishop's notes, although no echo report in [place].   - ECHO (12/2/2020): Hyperdynamic global left ventricular systolic function; EF of 70 %; Moderately increased LV wall thickness. Grade I diastolic dysfunction.  - Continue cholesterol meds  - will hold metoprolol and amlodipine    # Bipolar Disorder- Continue home meds Lamictal 100mg PO daily, Seroquel 200mg at bedtime, and Ativan 0.5mg PO twice daily PRN  _______________________________________________________________________________________  GI ppx: protonix  Diet: DASH/consistent carb  Activity: IAT (within room)  Dispo: Home   FULL CODE    Pending improvement in cardiac fn, on esmolol drip   75 F PMH COPD (requiring 5L home O2), A-fib (on pradaxa), HFpEF, HTN, DLD, Bipolar presents to ED for chest pain. Patient had COVID-19 pneumonia and was inpt from 12-7-12-11. In ED was in septic shock with JOYCE.     IMPRESSION  COVID-19  UTI  Sepsis on Admission  A-fib with RVR    #UTI with septic shock- UA + nitrite, LE + SBP<90,lactate: 3 s/p 3L NS in ED  - monitor BP, MAP >60  - s/p cefepime and levaquin in ED  - c/w cefepime  - tylenol prn for fever    #Acute on Chronic respiratory failure 2/2 COPD on home O2  - Continue home inhalers.  - triple inhaler therapy  - on HFNC currently, titrate sat to <92%  - resp status worsening-check ABG and CXR    # Paroxysmal atrial fibrillation- in RVR, possibly triggered by hypoxia vs sepsis   - On Pradaxa 150mg PO twice daily and ASA 81 mg   - Plavix recently added after a recent TIA last month.   - c/w esmolol infusion  - avoid positive chronotropes  - anxiolytic prn as would worsen tachycardia    #Anemia-drop in H/H by >1u this AM-repeat labs 16:00    #Chest pain- most likely pleuritic (Dr. Hoffmann Cardiologist) vs demand ischemia pain  - Trops - x1  negative. Not volume overloaded, no CHF exacerbation.   - CXR - Stable bilateral opacities. Low lung volumes with elevated right hemidiaphragm. No pneumothorax.  - ECHO (12/2/2020): Hyperdynamic global left ventricular systolic function; EF of 70 %; Moderately increased LV wall thickness. Grade I diastolic dysfunction.  - Sctress test 12/1/2020: No ischemic EKG changes  - CT chest( 12/2/2020): left upper lobe anterior bulla/focus of paraseptal emphysema now measuring 6.6 x 6.0 cm. No significant left lower lobe consolidation or left pleural effusion. peripheral predominant opacities    #JOYCE on admission-resolved  #recent hx COVID 19 Pneumonia- s/p RDV/2u plasma/Steroids  - Isolation Precaution   # Left ICA s/p TCAR (transcarotid artery revascularization) 12/04 with TIA last admission  - Continue plavix, pradaxa, ASA  #Left intra parotid mass- outpt biopsy     # Chronic HFpEF  # HTN  # HLD  - Preserved EF according to Dr. Bishop's notes, although no echo report in [place].   - ECHO (12/2/2020): Hyperdynamic global left ventricular systolic function; EF of 70 %; Moderately increased LV wall thickness. Grade I diastolic dysfunction.  - Continue cholesterol meds  - will hold metoprolol and amlodipine    # Bipolar Disorder- Continue home meds Lamictal 100mg PO daily, Seroquel 200mg at bedtime, and Ativan 0.5mg PO twice daily PRN  _______________________________________________________________________________________  GI ppx: protonix  Diet: DASH/consistent carb  Activity: IAT (within room)  Dispo: Home   FULL CODE    Pending improvement in cardiac fn, on esmolol drip

## 2020-12-29 LAB
ALBUMIN SERPL ELPH-MCNC: 2.7 G/DL — LOW (ref 3.5–5.2)
ALP SERPL-CCNC: 89 U/L — SIGNIFICANT CHANGE UP (ref 30–115)
ALT FLD-CCNC: 16 U/L — SIGNIFICANT CHANGE UP (ref 0–41)
ANION GAP SERPL CALC-SCNC: 10 MMOL/L — SIGNIFICANT CHANGE UP (ref 7–14)
AST SERPL-CCNC: 25 U/L — SIGNIFICANT CHANGE UP (ref 0–41)
BASOPHILS # BLD AUTO: 0.01 K/UL — SIGNIFICANT CHANGE UP (ref 0–0.2)
BASOPHILS NFR BLD AUTO: 0.2 % — SIGNIFICANT CHANGE UP (ref 0–1)
BILIRUB SERPL-MCNC: 0.6 MG/DL — SIGNIFICANT CHANGE UP (ref 0.2–1.2)
BUN SERPL-MCNC: 11 MG/DL — SIGNIFICANT CHANGE UP (ref 10–20)
CALCIUM SERPL-MCNC: 7.9 MG/DL — LOW (ref 8.5–10.1)
CHLORIDE SERPL-SCNC: 109 MMOL/L — SIGNIFICANT CHANGE UP (ref 98–110)
CO2 SERPL-SCNC: 20 MMOL/L — SIGNIFICANT CHANGE UP (ref 17–32)
CREAT SERPL-MCNC: 0.7 MG/DL — SIGNIFICANT CHANGE UP (ref 0.7–1.5)
D DIMER BLD IA.RAPID-MCNC: 3285 NG/ML DDU — HIGH (ref 0–230)
EOSINOPHIL # BLD AUTO: 0.29 K/UL — SIGNIFICANT CHANGE UP (ref 0–0.7)
EOSINOPHIL NFR BLD AUTO: 6.9 % — SIGNIFICANT CHANGE UP (ref 0–8)
FERRITIN SERPL-MCNC: 997 NG/ML — HIGH (ref 15–150)
GAS PNL BLDA: SIGNIFICANT CHANGE UP
GAS PNL BLDA: SIGNIFICANT CHANGE UP
GLUCOSE BLDC GLUCOMTR-MCNC: 96 MG/DL — SIGNIFICANT CHANGE UP (ref 70–99)
GLUCOSE SERPL-MCNC: 99 MG/DL — SIGNIFICANT CHANGE UP (ref 70–99)
HCT VFR BLD CALC: 27.8 % — LOW (ref 37–47)
HGB BLD-MCNC: 8.6 G/DL — LOW (ref 12–16)
IMM GRANULOCYTES NFR BLD AUTO: 0.2 % — SIGNIFICANT CHANGE UP (ref 0.1–0.3)
LYMPHOCYTES # BLD AUTO: 0.74 K/UL — LOW (ref 1.2–3.4)
LYMPHOCYTES # BLD AUTO: 17.7 % — LOW (ref 20.5–51.1)
MCHC RBC-ENTMCNC: 30.6 PG — SIGNIFICANT CHANGE UP (ref 27–31)
MCHC RBC-ENTMCNC: 30.9 G/DL — LOW (ref 32–37)
MCV RBC AUTO: 98.9 FL — SIGNIFICANT CHANGE UP (ref 81–99)
MONOCYTES # BLD AUTO: 0.25 K/UL — SIGNIFICANT CHANGE UP (ref 0.1–0.6)
MONOCYTES NFR BLD AUTO: 6 % — SIGNIFICANT CHANGE UP (ref 1.7–9.3)
NEUTROPHILS # BLD AUTO: 2.89 K/UL — SIGNIFICANT CHANGE UP (ref 1.4–6.5)
NEUTROPHILS NFR BLD AUTO: 69 % — SIGNIFICANT CHANGE UP (ref 42.2–75.2)
NRBC # BLD: 0 /100 WBCS — SIGNIFICANT CHANGE UP (ref 0–0)
PLATELET # BLD AUTO: 163 K/UL — SIGNIFICANT CHANGE UP (ref 130–400)
POTASSIUM SERPL-MCNC: 4.1 MMOL/L — SIGNIFICANT CHANGE UP (ref 3.5–5)
POTASSIUM SERPL-SCNC: 4.1 MMOL/L — SIGNIFICANT CHANGE UP (ref 3.5–5)
PROCALCITONIN SERPL-MCNC: 0.33 NG/ML — HIGH (ref 0.02–0.1)
PROT SERPL-MCNC: 5.1 G/DL — LOW (ref 6–8)
RBC # BLD: 2.81 M/UL — LOW (ref 4.2–5.4)
RBC # FLD: 13.8 % — SIGNIFICANT CHANGE UP (ref 11.5–14.5)
SODIUM SERPL-SCNC: 139 MMOL/L — SIGNIFICANT CHANGE UP (ref 135–146)
WBC # BLD: 4.19 K/UL — LOW (ref 4.8–10.8)
WBC # FLD AUTO: 4.19 K/UL — LOW (ref 4.8–10.8)

## 2020-12-29 PROCEDURE — 71045 X-RAY EXAM CHEST 1 VIEW: CPT | Mod: 26,77

## 2020-12-29 PROCEDURE — 71045 X-RAY EXAM CHEST 1 VIEW: CPT | Mod: 26

## 2020-12-29 PROCEDURE — 99291 CRITICAL CARE FIRST HOUR: CPT | Mod: CS

## 2020-12-29 RX ORDER — CHLORHEXIDINE GLUCONATE 213 G/1000ML
15 SOLUTION TOPICAL EVERY 12 HOURS
Refills: 0 | Status: DISCONTINUED | OUTPATIENT
Start: 2020-12-29 | End: 2020-12-30

## 2020-12-29 RX ORDER — DABIGATRAN ETEXILATE MESYLATE 150 MG/1
150 CAPSULE ORAL EVERY 12 HOURS
Refills: 0 | Status: DISCONTINUED | OUTPATIENT
Start: 2020-12-30 | End: 2020-12-31

## 2020-12-29 RX ORDER — PHENYLEPHRINE HYDROCHLORIDE 10 MG/ML
0.1 INJECTION INTRAVENOUS
Qty: 40 | Refills: 0 | Status: DISCONTINUED | OUTPATIENT
Start: 2020-12-29 | End: 2020-12-30

## 2020-12-29 RX ORDER — PROPOFOL 10 MG/ML
10 INJECTION, EMULSION INTRAVENOUS
Qty: 500 | Refills: 0 | Status: DISCONTINUED | OUTPATIENT
Start: 2020-12-29 | End: 2020-12-30

## 2020-12-29 RX ORDER — NOREPINEPHRINE BITARTRATE/D5W 8 MG/250ML
0.1 PLASTIC BAG, INJECTION (ML) INTRAVENOUS
Qty: 8 | Refills: 0 | Status: DISCONTINUED | OUTPATIENT
Start: 2020-12-29 | End: 2021-01-05

## 2020-12-29 RX ORDER — ACETAMINOPHEN 500 MG
650 TABLET ORAL EVERY 6 HOURS
Refills: 0 | Status: DISCONTINUED | OUTPATIENT
Start: 2020-12-29 | End: 2021-01-09

## 2020-12-29 RX ORDER — FENTANYL CITRATE 50 UG/ML
0.5 INJECTION INTRAVENOUS
Qty: 2500 | Refills: 0 | Status: DISCONTINUED | OUTPATIENT
Start: 2020-12-29 | End: 2021-01-05

## 2020-12-29 RX ORDER — CEFEPIME 1 G/1
1000 INJECTION, POWDER, FOR SOLUTION INTRAMUSCULAR; INTRAVENOUS EVERY 12 HOURS
Refills: 0 | Status: DISCONTINUED | OUTPATIENT
Start: 2020-12-29 | End: 2020-12-30

## 2020-12-29 RX ORDER — DEXMEDETOMIDINE HYDROCHLORIDE IN 0.9% SODIUM CHLORIDE 4 UG/ML
0.2 INJECTION INTRAVENOUS
Qty: 200 | Refills: 0 | Status: DISCONTINUED | OUTPATIENT
Start: 2020-12-29 | End: 2020-12-30

## 2020-12-29 RX ORDER — VANCOMYCIN HCL 1 G
1000 VIAL (EA) INTRAVENOUS EVERY 12 HOURS
Refills: 0 | Status: DISCONTINUED | OUTPATIENT
Start: 2020-12-29 | End: 2020-12-30

## 2020-12-29 RX ORDER — BENZOCAINE 10 %
1 GEL (GRAM) MUCOUS MEMBRANE ONCE
Refills: 0 | Status: COMPLETED | OUTPATIENT
Start: 2020-12-29 | End: 2020-12-29

## 2020-12-29 RX ADMIN — PANTOPRAZOLE SODIUM 40 MILLIGRAM(S): 20 TABLET, DELAYED RELEASE ORAL at 06:14

## 2020-12-29 RX ADMIN — Medication 100 MILLIGRAM(S): at 05:25

## 2020-12-29 RX ADMIN — Medication 1 SPRAY(S): at 05:30

## 2020-12-29 RX ADMIN — BUDESONIDE AND FORMOTEROL FUMARATE DIHYDRATE 2 PUFF(S): 160; 4.5 AEROSOL RESPIRATORY (INHALATION) at 08:30

## 2020-12-29 RX ADMIN — Medication 650 MILLIGRAM(S): at 22:00

## 2020-12-29 RX ADMIN — CEFEPIME 100 MILLIGRAM(S): 1 INJECTION, POWDER, FOR SOLUTION INTRAMUSCULAR; INTRAVENOUS at 05:25

## 2020-12-29 RX ADMIN — GABAPENTIN 300 MILLIGRAM(S): 400 CAPSULE ORAL at 05:25

## 2020-12-29 RX ADMIN — Medication 650 MILLIGRAM(S): at 21:24

## 2020-12-29 NOTE — PROGRESS NOTE ADULT - ASSESSMENT
Ms Henderson is a 76yo woman with medical history of COPD on 5L home oxygen, paroxysmal afib on pradaxa, carotid stenosis (L 80%, R 40%) s/p L carotid stenting recently , HFpEF (EF 70% Dec 2020), HTN, HLD, bipolar disorder, left parotid mass, recently admitted between 12/7-12/11 for COVID PNA s/p steroids/RDV presented to the ED with chest pain.       # Acute Chronic respiratory failure 2/2 likely due to COVID PNA with septic Shock   # Chest pain- likely pleuritic from COVID  #Severe Requiring Intubation  requiring pressors initially now off  CXR - Increasing diffuse bilateral parenchymal opacities 12/25  CXR noted to be worsening today. Pt acutely desaturated to 70s and 50s at one point.  Pt appeared to be in severe respiratory distress. STAT intubation called and pt was successfully intubated.  ECHO (12/2/2020): Hyperdynamic global left ventricular systolic function; EF of 70 %; Moderately increased LV wall thickness. Grade I diastolic dysfunction.  Stress test 12/1/2020: No ischemic EKG changes  c/w Vancomycin and Cefepime  ID recs noted  repeat BCX NGTD  MRSA nares  f/u repeat CXR after Intubation   Monitor respiratory status and adjust ventilator according to ABG  Central line placement   precedex, propofol, and fentanyl for sedation and pain control   start pressors as needed to maintain MAP > 65  Pulmonary follow up       #Paroxysmal atrial fibrillation w/ RVR   Hold Pradaxa 150mg PO twice daily for now given bleeding from tube   ASA 81 mg  and Plavix recently added after a recent TIA last month.   c/w Metoprolol for rate control    #JOYCE likely pre renal - resolved  baseline: 0.8-1.1  admission Cr 1.6 --> 1.0 (12/23)    # Left ICA s/p TCAR (transcarotid artery revascularization) 12/04 with TIA last admission  c/w plavix, pradaxa, ASA   Code stroke on 12/05 for aphasia - workup was negative for acute stroke    #Left intra parotid mass   needs outpt biopsy     # Chronic HFpEF  # HTN  #HLD  /65  Preserved EF according to Dr. Bishop's notes, although no echo report in [place].   ECHO (12/2/2020): Hyperdynamic global left ventricular systolic function; EF of 70 %; Moderately increased LV wall thickness. Grade I diastolic dysfunction.  c/w Atorvastatin  c/w Metoprolol   hold amlodipine for now    #Bipolar Disorder  Mood stable  Continue home meds Lamictal 100mg PO daily, Seroquel 200mg at bedtime, and Ativan 0.5mg PO twice daily PRN      Dispo: Home  Code: FULL

## 2020-12-29 NOTE — PROCEDURE NOTE - NSINDICATIONS_GEN_A_CORE
critical illness/venous access
arterial puncture to obtain ABG's/critical patient/monitoring purposes

## 2020-12-29 NOTE — AIRWAY PLACEMENT NOTE ADULT - POST AIRWAY PLACEMENT ASSESSMENT:
breath sounds bilateral/breath sounds equal/positive end tidal CO2 noted/CXR pending/chest excursion noted/skin color improved

## 2020-12-29 NOTE — PROGRESS NOTE ADULT - ASSESSMENT
ASSESSMENT  75y F with COPD on 5L home O2, paroxysmal afib, carotid stenosis, HFpEF, HTN, HLD, Bipolar disorder, recent admission D/C 12/11 s/p left TCAR, right groin access  found to be COVID-19 PNA s/p RDV, plasma now admitted with CP     IMPRESSION  #Severe COVID-19 PNA with septic shock requiring pressors     Procalcitonin, Serum: 0.84 ng/mL (12-22-20 @ 21:56)    CXR bilateral opacities , RLL opacity    UA unremarkable UCX NG    COVID-19 PCR: Detected (12-07-20 @ 12:30)  Ferritin, Serum: 997 ng/mL (12-28-20 @ 16:00)  #CoNS bacteremia , contaminant     Repeat BCX NG  #JOYCE  #Lactic acidosis  #Obesity BMI (kg/m2): 32.3, 34      RECOMMENDATIONS  - Today is D7 of ABX, recommend D/C  - Tocilizumab 400mg x1, and recheck D-dimer, CRP, Ferritin in 48h       If any questions, please call or send a message on [employer] Teams  Spectra [phone]

## 2020-12-29 NOTE — PROGRESS NOTE ADULT - SUBJECTIVE AND OBJECTIVE BOX
SHAUN COATES  75y, Female  Allergy: codeine (Other (Moderate))  Depakote (Unknown)  Dilaudid (Short breath; Rash)  IV Contrast (Anaphylaxis)  losartan (Angioedema)  Risperdal (Other)  verapamil (Short breath; Angioedema)      LOS  6d    CHIEF COMPLAINT: chest pain weakness (28 Dec 2020 09:05)      INTERVAL EVENTS/HPI  - HFNC  - T(F): , Max: 99.6 (12-29-20 @ 00:18)  - WBC Count: 4.19 (12-29-20 @ 05:52)  WBC Count: 3.38 (12-28-20 @ 05:55)     - Creatinine, Serum: 0.7 (12-28-20 @ 05:55)  Creatinine, Serum: 0.8 (12-27-20 @ 12:03)           VITALS:  T(F): 99, Max: 99.6 (12-29-20 @ 00:18)  HR: 81  BP: 146/65  RR: 25Vital Signs Last 24 Hrs  T(C): 37.2 (29 Dec 2020 07:48), Max: 37.6 (29 Dec 2020 00:18)  T(F): 99 (29 Dec 2020 07:48), Max: 99.6 (29 Dec 2020 00:18)  HR: 81 (29 Dec 2020 07:48) (81 - 110)  BP: 146/65 (29 Dec 2020 07:48) (117/58 - 182/77)  BP(mean): 104 (28 Dec 2020 18:55) (81 - 113)  RR: 25 (29 Dec 2020 07:48) (23 - 50)  SpO2: 98% (29 Dec 2020 07:48) (93% - 98%)    FH: Non-contributory  Social Hx: Non-contributory    TESTS & MEASUREMENTS:                        8.6    4.19  )-----------( 163      ( 29 Dec 2020 05:52 )             27.8     12-28    143  |  114<H>  |  13  ----------------------------<  103<H>  4.1   |  20  |  0.7    Ca    7.8<L>      28 Dec 2020 05:55              Culture - Blood (collected 12-25-20 @ 11:27)  Source: .Blood None  Preliminary Report (12-26-20 @ 16:01):    No growth to date.    Culture - Blood (collected 12-24-20 @ 22:53)  Source: .Blood Blood-Venous  Preliminary Report (12-26-20 @ 13:01):    No growth to date.    Culture - Urine (collected 12-23-20 @ 00:07)  Source: .Urine Clean Catch (Midstream)  Final Report (12-24-20 @ 02:06):    <10,000 CFU/mL Normal Urogenital Beronica    Culture - Blood (collected 12-22-20 @ 21:53)  Source: .Blood Blood-Peripheral  Gram Stain (12-24-20 @ 03:50):    Growth in aerobic bottle: Gram Positive Cocci in Clusters  Final Report (12-25-20 @ 09:40):    Growth in anaerobic bottle: Staphylococcus capitis    Coag Negative Staphylococcus    Single set isolate, possible contaminant. Contact    Microbiology if susceptibility testing clinically    indicated.    ***Blood Panel PCR results on this specimen are available    approximately 3 hours after the Gram stain result.***    Gram stain, PCR, and/or culture results may not always    correspond due to difference in methodologies.    ************************************************************    This PCR assay was performed using [employer].    The following targets are tested for: Enterococcus,    vancomycin resistant enterococci, Listeria monocytogenes,    coagulase negative staphylococci, S. aureus,    methicillin resistant S. aureus, Streptococcus agalactiae    (Group B), S. pneumoniae, S. pyogenes (Group A),    Acinetobacter baumannii, Enterobacter cloacae, E. coli,    Klebsiella oxytoca, K. pneumoniae, Proteus sp.,    Serratia marcescens, Haemophilus influenzae,    Neisseria meningitidis, Pseudomonas aeruginosa, Candida    albicans, C. glabrata, C krusei, C parapsilosis,    C. tropicalis and the KPC resistance gene.    "Due to technical problems, Proteus sp. and Pseudomonas    aeruginosa will Not be reported as part of the BCID panel    until further notice".  Organism: Blood Culture PCR (12-25-20 @ 09:40)  Organism: Blood Culture PCR (12-25-20 @ 09:40)      -  Coagulase negative Staphylococcus: Detec      Method Type: PCR    Culture - Blood (collected 12-22-20 @ 21:53)  Source: .Blood Blood-Peripheral  Final Report (12-28-20 @ 07:00):    No Growth Final    Culture - Blood (collected 12-07-20 @ 11:28)  Source: .Blood Blood-Peripheral  Final Report (12-12-20 @ 23:01):    No Growth Final            INFECTIOUS DISEASES TESTING  Procalcitonin, Serum: 0.33 (12-28-20 @ 16:00)  MRSA PCR Result.: Negative (12-28-20 @ 14:33)  Vancomycin Level, Trough: 11.4 (12-28-20 @ 10:40)  Procalcitonin, Serum: 1.09 (12-25-20 @ 06:34)  Procalcitonin, Serum: 0.31 (12-24-20 @ 22:52)  COVID-19 PCR: Detected (12-23-20 @ 04:50)  Procalcitonin, Serum: 0.84 (12-22-20 @ 21:56)  Procalcitonin, Serum: 0.10 (12-11-20 @ 08:14)  Vancomycin Level, Trough: 16.2 (12-08-20 @ 16:29)  COVID-19 PCR: Detected (12-07-20 @ 12:30)  Procalcitonin, Serum: 0.09 (12-07-20 @ 10:05)  Procalcitonin, Serum: 0.09 (11-29-20 @ 21:30)  COVID-19 PCR: NotDetec (11-29-20 @ 13:30)  Procalcitonin, Serum: 0.10 (11-29-20 @ 13:15)  Rapid RVP Result: NotDetec (10-14-20 @ 18:06)      INFLAMMATORY MARKERS  C-Reactive Protein, Serum: 9.57 mg/dL (12-23-20 @ 07:33)  C-Reactive Protein, Serum: 2.39 mg/dL (12-11-20 @ 08:14)  C-Reactive Protein, Serum: 6.71 mg/dL (12-09-20 @ 05:37)  C-Reactive Protein, Serum: 8.14 mg/dL (12-09-20 @ 02:00)      RADIOLOGY & ADDITIONAL TESTS:  I have personally reviewed the last available Chest xray  CXR  Xray Chest 1 View- PORTABLE-Urgent:   EXAM:  XR CHEST PORTABLE URGENT 1V            PROCEDURE DATE:  12/28/2020            INTERPRETATION:  Clinical History / Reason for exam: Respiratory failure.    Comparison : Chest radiograph 3 days prior.    Technique/Positioning: Frontal portable.    Findings:    Support devices: Telemetry leads overlie the thorax.    Cardiac/mediastinum/hilum: Unremarkable.    Lung parenchyma/Pleura: Bilateral opacities.    Skeleton/soft tissues: Unremarkable.    Impression:    Bilateral opacifications without change, consistent with viral pneumonia.                  DAMION STRONG MD; Attending Interventional Radiologist  This document has been electronically signed. Dec 28 2020 11:45AM (12-28-20 @ 12:00)      CT      CARDIOLOGY TESTING  12 Lead ECG:   Ventricular Rate 176 BPM    Atrial Rate 182 BPM    QRS Duration 84 ms    Q-T Interval 264 ms    QTC Calculation(Bazett) 451 ms    R Axis 25 degrees    T Axis 212 degrees    Diagnosis Line Atrial fibrillation with rapid ventricular response  Marked ST abnormality, possible inferior subendocardial injury  Abnormal ECG    Confirmed by Scott Cowan ([id number]) on 12/27/2020 12:59:53 PM (12-27-20 @ 05:38)  12 Lead ECG:   Ventricular Rate 131 BPM    Atrial Rate 156 BPM    QRS Duration 90 ms    Q-T Interval 322 ms    QTC Calculation(Bazett) 475 ms    R Axis 29 degrees    T Axis 17 degrees    Diagnosis Line Atrial fibrillation with rapid ventricular response  Nonspecific ST and T wave abnormality  Abnormal ECG    Confirmed by YANN MATTSON MD [id number]) on 12/28/2020 12:50:38 PM (12-25-20 @ 08:30)      MEDICATIONS  aspirin  chewable 81 Oral daily  atorvastatin 20 Oral at bedtime  budesonide 160 MICROgram(s)/formoterol 4.5 MICROgram(s) Inhaler 2 Inhalation two times a day  cefepime   IVPB 1000 IV Intermittent every 12 hours  clopidogrel Tablet 75 Oral daily  dabigatran 150 Oral every 12 hours  gabapentin 300 Oral three times a day  lamoTRIgine 100 Oral daily  metoprolol tartrate 100 Oral every 12 hours  montelukast 10 Oral daily  multivitamin 1 Oral daily  pantoprazole    Tablet 40 Oral before breakfast  QUEtiapine 200 Oral at bedtime  sodium chloride 0.9%. 1000 IV Continuous <Continuous>  sodium chloride 0.9%. 1000 IV Continuous <Continuous>  tiotropium 18 MICROgram(s) Capsule 1 Inhalation daily  vancomycin  IVPB 1000 IV Intermittent every 24 hours      WEIGHT  Weight (kg): 82.599 (12-23-20 @ 04:25)      ANTIBIOTICS:  cefepime   IVPB 1000 milliGRAM(s) IV Intermittent every 12 hours  vancomycin  IVPB 1000 milliGRAM(s) IV Intermittent every 24 hours      All available historical records have been reviewed

## 2020-12-29 NOTE — PROGRESS NOTE ADULT - SUBJECTIVE AND OBJECTIVE BOX
SHAUN COATES 75y Female  MRN#: [id number]   Hospital Day: 6d    SUBJECTIVE  Patient is a 75y old Female who presents with a chief complaint of chest pain weakness (29 Dec 2020 17:19)  Currently admitted to medicine with the primary diagnosis of Septic shock      INTERVAL HPI AND OVERNIGHT EVENTS:  Patient was examined and seen at bedside.     Patient was found to be in severe respiratory distress this afternoon; SpO2 downtrending to the 60s on high flow, tachypneic and tachycardic.  Patient was subsequently intubated; emergent radial cherelle and and emergent left IJ were immediately placed thereafter.     OBJECTIVE  PAST MEDICAL & SURGICAL HISTORY  Falls    Congestive heart failure    Transient ischemic attack    FLAVIO on CPAP    Bipolar 1 disorder    Allergic reaction    PVD (peripheral vascular disease)    Other emphysema    Other cardiomyopathy    TIA (transient ischemic attack)    COPD (chronic obstructive pulmonary disease)    Depression    Hypertension    History of cholecystectomy      ALLERGIES:  codeine (Other (Moderate))  Depakote (Unknown)  Dilaudid (Short breath; Rash)  IV Contrast (Anaphylaxis)  losartan (Angioedema)  Risperdal (Other)  verapamil (Short breath; Angioedema)    MEDICATIONS:  STANDING MEDICATIONS  aspirin  chewable 81 milliGRAM(s) Oral daily  atorvastatin 20 milliGRAM(s) Oral at bedtime  budesonide 160 MICROgram(s)/formoterol 4.5 MICROgram(s) Inhaler 2 Puff(s) Inhalation two times a day  chlorhexidine 0.12% Liquid 15 milliLiter(s) Oral Mucosa every 12 hours  clopidogrel Tablet 75 milliGRAM(s) Oral daily  dexMEDEtomidine Infusion 0.2 MICROgram(s)/kG/Hr IV Continuous <Continuous>  fentaNYL   Infusion. 0.5 MICROgram(s)/kG/Hr IV Continuous <Continuous>  gabapentin 300 milliGRAM(s) Oral three times a day  lamoTRIgine 100 milliGRAM(s) Oral daily  metoprolol tartrate 100 milliGRAM(s) Oral every 12 hours  montelukast 10 milliGRAM(s) Oral daily  multivitamin 1 Tablet(s) Oral daily  norepinephrine Infusion 0.1 MICROgram(s)/kG/Min IV Continuous <Continuous>  pantoprazole    Tablet 40 milliGRAM(s) Oral before breakfast  phenylephrine    Infusion 0.1 MICROgram(s)/kG/Min IV Continuous <Continuous>  propofol Infusion 10 MICROgram(s)/kG/Min IV Continuous <Continuous>  QUEtiapine 200 milliGRAM(s) Oral at bedtime  sodium chloride 0.9%. 1000 milliLiter(s) IV Continuous <Continuous>  sodium chloride 0.9%. 1000 milliLiter(s) IV Continuous <Continuous>  tiotropium 18 MICROgram(s) Capsule 1 Capsule(s) Inhalation daily    PRN MEDICATIONS  acetaminophen  Suppository .. 650 milliGRAM(s) Rectal every 6 hours PRN  ALBUTerol    90 MICROgram(s) HFA Inhaler 1 Puff(s) Inhalation every 4 hours PRN  aluminum hydroxide/magnesium hydroxide/simethicone Suspension 30 milliLiter(s) Oral every 4 hours PRN  guaifenesin/dextromethorphan  Syrup 10 milliLiter(s) Oral every 6 hours PRN  LORazepam     Tablet 0.5 milliGRAM(s) Oral two times a day PRN      VITAL SIGNS: Last 24 Hours  T(C): 38.6 (29 Dec 2020 20:00), Max: 38.6 (29 Dec 2020 20:00)  T(F): 101.4 (29 Dec 2020 20:00), Max: 101.4 (29 Dec 2020 20:00)  HR: 78 (29 Dec 2020 20:00) (70 - 128)  BP: 158/82 (29 Dec 2020 20:00) (120/58 - 229/122)  BP(mean): --  RR: 14 (29 Dec 2020 20:00) (14 - 30)  SpO2: 100% (29 Dec 2020 20:00) (93% - 100%)    LABS:                        8.6    4.19  )-----------( 163      ( 29 Dec 2020 05:52 )             27.8     12-29    139  |  109  |  11  ----------------------------<  99  4.1   |  20  |  0.7    Ca    7.9<L>      29 Dec 2020 05:52    TPro  5.1<L>  /  Alb  2.7<L>  /  TBili  0.6  /  DBili  x   /  AST  25  /  ALT  16  /  AlkPhos  89  12-29        ABG - ( 29 Dec 2020 15:33 )  pH, Arterial: 7.33  pH, Blood: x     /  pCO2: 46    /  pO2: 133   / HCO3: 24    / Base Excess: -2.1  /  SaO2: 99                        RADIOLOGY:  < from: Xray Chest 1 View-PORTABLE IMMEDIATE (Xray Chest 1 View-PORTABLE IMMEDIATE .) (12.29.20 @ 14:33) >    Impression:    Stable bilateral parenchymal opacities.    < end of copied text >  < from: Xray Chest 1 View- PORTABLE-Urgent (Xray Chest 1 View- PORTABLE-Urgent .) (12.28.20 @ 12:00) >  Impression:    Bilateral opacifications without change, consistent with viral pneumonia.    < end of copied text >        PHYSICAL EXAM:  CONSTITUTIONAL: Severe repspiratory distress at time of physical examination AAOx3  HEAD: Atraumatic, normocephalic  EYES: EOM intact, PERRLA, conjunctiva and sclera clear  ENT: Supple, no masses, no thyromegaly, no bruits, no JVD; moist mucous membranes  PULMONARY: Decreased BS bilaterally; tachypniec; severe respiratory distress   CARDIOVASCULAR: Tachycardic  GASTROINTESTINAL: Soft, non-tender, non-distended; bowel sounds present  MUSCULOSKELETAL: 2+ peripheral pulses; no clubbing, no cyanosis, no edema  NEUROLOGY: non-focal      ASSESSMENT & PLAN  Ms Coates is a 76yo woman with medical history of COPD on 5L home oxygen, paroxysmal afib on pradaxa, carotid stenosis (L 80%, R 40%) s/p L carotid stenting recently , HFpEF (EF 70% Dec 2020), HTN, HLD, bipolar disorder, left parotid mass, recently admitted between 12/7-12/11 for COVID PNA s/p steroids/RDV presented to the ED with chest pain.       # Acute Chronic respiratory failure 2/2 likely due to COVID PNA with septic Shock   # Chest pain- likely pleuritic from COVID  requiring pressors initially now off  CXR - Increasing diffuse bilateral parenchymal opacities 12/25  Patient was noted to be in severe respiratory distress; patient desaturated to a 50s on high flow nasal canula; Was tachypneic and tachycardic and was subsequently intubated.   CXR s/p intubation demonstrates adequate ETT, OGT and Central Line placement   ABG s/p intubation-->7.33/45/133/24   Patient is currently on A/C Volume MV; TV: 400, RR: 16, FiO2 90%, PEEP 10   1g q12 Vancomycin, 1g  q12 Cefepime started; BCx were collected prior to Abx starting  Ferritin-->910-->997  CRP-->2.39-->9.57  PCT-->1.09-->.33  Will repeat inflammatory markers in the AM  f/u ID in the AM    ECHO (12/2/2020): Hyperdynamic global left ventricular systolic function; EF of 70 %; Moderately increased LV wall thickness. Grade I diastolic dysfunction.  MRSA nares  precedex, , and fentanyl for sedation and pain control   start pressors as needed to maintain MAP > 65  Pulmonary follow up       #Paroxysmal atrial fibrillation w/ RVR   Hold Pradaxa 150mg PO twice daily for now given bleeding from tube   ASA 81 mg  and Plavix recently added after a recent TIA last month.   c/w Metoprolol for rate control    #JOYCE likely pre renal - resolved  baseline: 0.8-1.1  SCr .7 today    # Left ICA s/p TCAR (transcarotid artery revascularization) 12/04 with TIA last admission  c/w plavix, pradaxa, ASA   Code stroke on 12/05 for aphasia - workup was negative for acute stroke    #Left intra parotid mass   needs outpt biopsy     # Chronic HFpEF  # HTN  #HLD  /65  Preserved EF according to Dr. Bishop's notes, although no echo report in [place].   ECHO (12/2/2020): Hyperdynamic global left ventricular systolic function; EF of 70 %; Moderately increased LV wall thickness. Grade I diastolic dysfunction.  c/w Atorvastatin  c/w Metoprolol -->Hold if SBP<100      #Bipolar Disorder  Mood stable  Continue home meds Lamictal 100mg PO daily, Seroquel 200mg at bedtime, and Ativan 0.5mg PO twice daily PRN      #Misc  - DVT Prophylaxis: Pradaxa   - Diet: NPO w/tube feeds   - GI Prophylaxis: PPI   - Activity: AAT   - Code Status: Full Code    Dispo: Acute

## 2020-12-29 NOTE — PROCEDURE NOTE - NSPROCDETAILS_GEN_ALL_CORE
location identified, draped/prepped, sterile technique used, needle inserted/introduced
guidewire recovered/lumen(s) aspirated and flushed/sterile dressing applied/sterile technique, catheter placed/ultrasound guidance with use of sterile gel and probe cove

## 2020-12-29 NOTE — PROGRESS NOTE ADULT - SUBJECTIVE AND OBJECTIVE BOX
Pt was seen and examined. Pt reports not feeling well this morning. Pt reported difficulty breathing.     ROS: unable to obtain full ROS due to confusion.    PAST MEDICAL & SURGICAL HISTORY:  Falls    Congestive heart failure    Transient ischemic attack    FLAVIO on CPAP    Bipolar 1 disorder    Allergic reaction    PVD (peripheral vascular disease)    Other emphysema    Other cardiomyopathy    TIA (transient ischemic attack)    COPD (chronic obstructive pulmonary disease)    Depression    Hypertension    History of cholecystectomy    Allergies    codeine (Other (Moderate))  Depakote (Unknown)  Dilaudid (Short breath; Rash)  IV Contrast (Anaphylaxis)  losartan (Angioedema)  Risperdal (Other)  verapamil (Short breath; Angioedema)    Intolerances    MEDICATIONS  (STANDING):  aspirin  chewable 81 milliGRAM(s) Oral daily  atorvastatin 20 milliGRAM(s) Oral at bedtime  budesonide 160 MICROgram(s)/formoterol 4.5 MICROgram(s) Inhaler 2 Puff(s) Inhalation two times a day  clopidogrel Tablet 75 milliGRAM(s) Oral daily  dexMEDEtomidine Infusion 0.2 MICROgram(s)/kG/Hr (4.13 mL/Hr) IV Continuous <Continuous>  fentaNYL   Infusion. 0.5 MICROgram(s)/kG/Hr (4.13 mL/Hr) IV Continuous <Continuous>  gabapentin 300 milliGRAM(s) Oral three times a day  lamoTRIgine 100 milliGRAM(s) Oral daily  metoprolol tartrate 100 milliGRAM(s) Oral every 12 hours  montelukast 10 milliGRAM(s) Oral daily  multivitamin 1 Tablet(s) Oral daily  pantoprazole    Tablet 40 milliGRAM(s) Oral before breakfast  phenylephrine    Infusion 0.1 MICROgram(s)/kG/Min (3.1 mL/Hr) IV Continuous <Continuous>  propofol Infusion 10 MICROgram(s)/kG/Min (4.96 mL/Hr) IV Continuous <Continuous>  QUEtiapine 200 milliGRAM(s) Oral at bedtime  sodium chloride 0.9%. 1000 milliLiter(s) (75 mL/Hr) IV Continuous <Continuous>  sodium chloride 0.9%. 1000 milliLiter(s) (50 mL/Hr) IV Continuous <Continuous>  tiotropium 18 MICROgram(s) Capsule 1 Capsule(s) Inhalation daily    MEDICATIONS  (PRN):  acetaminophen   Tablet .. 650 milliGRAM(s) Oral every 6 hours PRN Mild Pain (1 - 3)  ALBUTerol    90 MICROgram(s) HFA Inhaler 1 Puff(s) Inhalation every 4 hours PRN Shortness of Breath  aluminum hydroxide/magnesium hydroxide/simethicone Suspension 30 milliLiter(s) Oral every 4 hours PRN Dyspepsia  guaifenesin/dextromethorphan  Syrup 10 milliLiter(s) Oral every 6 hours PRN Cough  LORazepam     Tablet 0.5 milliGRAM(s) Oral two times a day PRN Anxiety    Vital Signs Last 24 Hrs  T(C): 38 (29 Dec 2020 13:23), Max: 38 (29 Dec 2020 13:23)  T(F): 100.4 (29 Dec 2020 13:23), Max: 100.4 (29 Dec 2020 13:23)  HR: 83 (29 Dec 2020 16:45) (70 - 128)  BP: 172/73 (29 Dec 2020 15:55) (120/58 - 229/122)  BP(mean): 104 (28 Dec 2020 18:55) (104 - 104)  RR: 14 (29 Dec 2020 16:45) (14 - 32)  SpO2: 100% (29 Dec 2020 16:45) (93% - 100%)    Physical Exam:  Constitutional: moderate resp distress  HEENT: on HFNC  LUNGS: decrease BS diffusely  HEART: irregular, rate varies but is sometimes >100  ABD: Soft, non-tender, non-distended.   EXT: edema UE/LE  NEURO: awake, lethargic, confused    Labs:  CBC                        8.6    4.19  )-----------( 163      ( 29 Dec 2020 05:52 )             27.8   CMP  12-29    139  |  109  |  11  ----------------------------<  99  4.1   |  20  |  0.7    Ca    7.9<L>      29 Dec 2020 05:52    TPro  5.1<L>  /  Alb  2.7<L>  /  TBili  0.6  /  DBili  x   /  AST  25  /  ALT  16  /  AlkPhos  89  12-29    < from: Xray Chest 1 View- PORTABLE-Urgent (Xray Chest 1 View- PORTABLE-Urgent .) (12.28.20 @ 12:00) >  Impression:    Bilateral opacifications without change, consistent with viral pneumonia.EXAM:  XR CHEST PORTABLE URGENT 1V            PROCEDURE DATE:  12/28/2020      < end of copied text >

## 2020-12-29 NOTE — PROGRESS NOTE ADULT - ATTENDING COMMENTS
I have discussed the case with the resident/mid level provider. I have personally performed a history, physical exam, and my own medical decision making.     Upon my evaluation, this patient had a high probability of imminent or life-threatening deterioration due to severe respiratory failure from COVID PNA, which required my direct attention, intervention, and personal management.    I have personally provided 45 minutes of critical care time exclusive of time spent on separately billable procedures. Time includes review of laboratory data, radiology results, discussion with consultants, and monitoring for potential decompensation. Interventions were performed as documented above.

## 2020-12-30 LAB
ALBUMIN SERPL ELPH-MCNC: 2.5 G/DL — LOW (ref 3.5–5.2)
ALP SERPL-CCNC: 100 U/L — SIGNIFICANT CHANGE UP (ref 30–115)
ALT FLD-CCNC: 15 U/L — SIGNIFICANT CHANGE UP (ref 0–41)
ANION GAP SERPL CALC-SCNC: 9 MMOL/L — SIGNIFICANT CHANGE UP (ref 7–14)
AST SERPL-CCNC: 24 U/L — SIGNIFICANT CHANGE UP (ref 0–41)
BASE EXCESS BLDA CALC-SCNC: -1.5 MMOL/L — SIGNIFICANT CHANGE UP (ref -2–2)
BASE EXCESS BLDA CALC-SCNC: -1.9 MMOL/L — SIGNIFICANT CHANGE UP (ref -2–2)
BASOPHILS # BLD AUTO: 0.01 K/UL — SIGNIFICANT CHANGE UP (ref 0–0.2)
BASOPHILS NFR BLD AUTO: 0.2 % — SIGNIFICANT CHANGE UP (ref 0–1)
BILIRUB SERPL-MCNC: 0.5 MG/DL — SIGNIFICANT CHANGE UP (ref 0.2–1.2)
BUN SERPL-MCNC: 16 MG/DL — SIGNIFICANT CHANGE UP (ref 10–20)
CALCIUM SERPL-MCNC: 8 MG/DL — LOW (ref 8.5–10.1)
CHLORIDE SERPL-SCNC: 110 MMOL/L — SIGNIFICANT CHANGE UP (ref 98–110)
CO2 SERPL-SCNC: 22 MMOL/L — SIGNIFICANT CHANGE UP (ref 17–32)
CREAT SERPL-MCNC: 0.8 MG/DL — SIGNIFICANT CHANGE UP (ref 0.7–1.5)
CRP SERPL-MCNC: 9.86 MG/DL — HIGH (ref 0–0.4)
CULTURE RESULTS: NO GROWTH — SIGNIFICANT CHANGE UP
CULTURE RESULTS: SIGNIFICANT CHANGE UP
CULTURE RESULTS: SIGNIFICANT CHANGE UP
D DIMER BLD IA.RAPID-MCNC: 2769 NG/ML DDU — HIGH (ref 0–230)
EOSINOPHIL # BLD AUTO: 0.32 K/UL — SIGNIFICANT CHANGE UP (ref 0–0.7)
EOSINOPHIL NFR BLD AUTO: 6.9 % — SIGNIFICANT CHANGE UP (ref 0–8)
FERRITIN SERPL-MCNC: 440 NG/ML — HIGH (ref 15–150)
FERRITIN SERPL-MCNC: 497 NG/ML — HIGH (ref 15–150)
GAS PNL BLDA: SIGNIFICANT CHANGE UP
GAS PNL BLDA: SIGNIFICANT CHANGE UP
GLUCOSE BLDC GLUCOMTR-MCNC: 154 MG/DL — HIGH (ref 70–99)
GLUCOSE SERPL-MCNC: 117 MG/DL — HIGH (ref 70–99)
HCO3 BLDA-SCNC: 24 MMOL/L — SIGNIFICANT CHANGE UP (ref 23–27)
HCO3 BLDA-SCNC: 25 MMOL/L — SIGNIFICANT CHANGE UP (ref 23–27)
HCT VFR BLD CALC: 26.9 % — LOW (ref 37–47)
HGB BLD-MCNC: 8.3 G/DL — LOW (ref 12–16)
HOROWITZ INDEX BLDA+IHG-RTO: 40 — SIGNIFICANT CHANGE UP
HOROWITZ INDEX BLDA+IHG-RTO: 40 — SIGNIFICANT CHANGE UP
IMM GRANULOCYTES NFR BLD AUTO: 0.2 % — SIGNIFICANT CHANGE UP (ref 0.1–0.3)
LYMPHOCYTES # BLD AUTO: 1.09 K/UL — LOW (ref 1.2–3.4)
LYMPHOCYTES # BLD AUTO: 23.6 % — SIGNIFICANT CHANGE UP (ref 20.5–51.1)
MAGNESIUM SERPL-MCNC: 1.9 MG/DL — SIGNIFICANT CHANGE UP (ref 1.8–2.4)
MCHC RBC-ENTMCNC: 30.3 PG — SIGNIFICANT CHANGE UP (ref 27–31)
MCHC RBC-ENTMCNC: 30.9 G/DL — LOW (ref 32–37)
MCV RBC AUTO: 98.2 FL — SIGNIFICANT CHANGE UP (ref 81–99)
MONOCYTES # BLD AUTO: 0.28 K/UL — SIGNIFICANT CHANGE UP (ref 0.1–0.6)
MONOCYTES NFR BLD AUTO: 6.1 % — SIGNIFICANT CHANGE UP (ref 1.7–9.3)
NEUTROPHILS # BLD AUTO: 2.91 K/UL — SIGNIFICANT CHANGE UP (ref 1.4–6.5)
NEUTROPHILS NFR BLD AUTO: 63 % — SIGNIFICANT CHANGE UP (ref 42.2–75.2)
NRBC # BLD: 0 /100 WBCS — SIGNIFICANT CHANGE UP (ref 0–0)
PCO2 BLDA: 41 MMHG — SIGNIFICANT CHANGE UP (ref 38–42)
PCO2 BLDA: 48 MMHG — HIGH (ref 38–42)
PH BLDA: 7.32 — LOW (ref 7.38–7.42)
PH BLDA: 7.37 — LOW (ref 7.38–7.42)
PHOSPHATE SERPL-MCNC: 2.8 MG/DL — SIGNIFICANT CHANGE UP (ref 2.1–4.9)
PLATELET # BLD AUTO: 176 K/UL — SIGNIFICANT CHANGE UP (ref 130–400)
PO2 BLDA: 180 MMHG — HIGH (ref 78–95)
PO2 BLDA: 61 MMHG — LOW (ref 78–95)
POTASSIUM SERPL-MCNC: 4.6 MMOL/L — SIGNIFICANT CHANGE UP (ref 3.5–5)
POTASSIUM SERPL-SCNC: 4.6 MMOL/L — SIGNIFICANT CHANGE UP (ref 3.5–5)
PROCALCITONIN SERPL-MCNC: 0.31 NG/ML — HIGH (ref 0.02–0.1)
PROCALCITONIN SERPL-MCNC: 6.12 NG/ML — HIGH (ref 0.02–0.1)
PROT SERPL-MCNC: 5.1 G/DL — LOW (ref 6–8)
RBC # BLD: 2.74 M/UL — LOW (ref 4.2–5.4)
RBC # FLD: 13.7 % — SIGNIFICANT CHANGE UP (ref 11.5–14.5)
SAO2 % BLDA: 100 % — HIGH (ref 94–98)
SAO2 % BLDA: 91 % — LOW (ref 94–98)
SODIUM SERPL-SCNC: 141 MMOL/L — SIGNIFICANT CHANGE UP (ref 135–146)
SPECIMEN SOURCE: SIGNIFICANT CHANGE UP
WBC # BLD: 4.62 K/UL — LOW (ref 4.8–10.8)
WBC # FLD AUTO: 4.62 K/UL — LOW (ref 4.8–10.8)

## 2020-12-30 PROCEDURE — 99232 SBSQ HOSP IP/OBS MODERATE 35: CPT | Mod: CS

## 2020-12-30 PROCEDURE — 99233 SBSQ HOSP IP/OBS HIGH 50: CPT | Mod: CS

## 2020-12-30 PROCEDURE — 71045 X-RAY EXAM CHEST 1 VIEW: CPT | Mod: 26,76

## 2020-12-30 RX ORDER — PROPOFOL 10 MG/ML
10 INJECTION, EMULSION INTRAVENOUS
Qty: 1000 | Refills: 0 | Status: DISCONTINUED | OUTPATIENT
Start: 2020-12-30 | End: 2021-01-05

## 2020-12-30 RX ORDER — CHLORHEXIDINE GLUCONATE 213 G/1000ML
15 SOLUTION TOPICAL EVERY 12 HOURS
Refills: 0 | Status: DISCONTINUED | OUTPATIENT
Start: 2020-12-30 | End: 2021-01-09

## 2020-12-30 RX ORDER — ASCORBIC ACID 60 MG
500 TABLET,CHEWABLE ORAL DAILY
Refills: 0 | Status: DISCONTINUED | OUTPATIENT
Start: 2020-12-30 | End: 2021-01-09

## 2020-12-30 RX ORDER — DEXMEDETOMIDINE HYDROCHLORIDE IN 0.9% SODIUM CHLORIDE 4 UG/ML
1 INJECTION INTRAVENOUS
Qty: 400 | Refills: 0 | Status: DISCONTINUED | OUTPATIENT
Start: 2020-12-30 | End: 2020-12-30

## 2020-12-30 RX ORDER — TOCILIZUMAB 20 MG/ML
400 INJECTION, SOLUTION, CONCENTRATE INTRAVENOUS ONCE
Refills: 0 | Status: COMPLETED | OUTPATIENT
Start: 2020-12-30 | End: 2020-12-30

## 2020-12-30 RX ORDER — MIDAZOLAM HYDROCHLORIDE 1 MG/ML
0.02 INJECTION, SOLUTION INTRAMUSCULAR; INTRAVENOUS
Qty: 100 | Refills: 0 | Status: DISCONTINUED | OUTPATIENT
Start: 2020-12-30 | End: 2020-12-30

## 2020-12-30 RX ORDER — POLYETHYLENE GLYCOL 3350 17 G/17G
17 POWDER, FOR SOLUTION ORAL DAILY
Refills: 0 | Status: DISCONTINUED | OUTPATIENT
Start: 2020-12-30 | End: 2021-01-09

## 2020-12-30 RX ORDER — SENNA PLUS 8.6 MG/1
2 TABLET ORAL AT BEDTIME
Refills: 0 | Status: DISCONTINUED | OUTPATIENT
Start: 2020-12-30 | End: 2021-01-09

## 2020-12-30 RX ORDER — SODIUM CHLORIDE 9 MG/ML
1000 INJECTION INTRAMUSCULAR; INTRAVENOUS; SUBCUTANEOUS
Refills: 0 | Status: DISCONTINUED | OUTPATIENT
Start: 2020-12-30 | End: 2021-01-04

## 2020-12-30 RX ADMIN — QUETIAPINE FUMARATE 200 MILLIGRAM(S): 200 TABLET, FILM COATED ORAL at 21:41

## 2020-12-30 RX ADMIN — POLYETHYLENE GLYCOL 3350 17 GRAM(S): 17 POWDER, FOR SOLUTION ORAL at 11:27

## 2020-12-30 RX ADMIN — Medication 500 MILLIGRAM(S): at 21:42

## 2020-12-30 RX ADMIN — TOCILIZUMAB 100 MILLIGRAM(S): 20 INJECTION, SOLUTION, CONCENTRATE INTRAVENOUS at 12:13

## 2020-12-30 RX ADMIN — GABAPENTIN 300 MILLIGRAM(S): 400 CAPSULE ORAL at 21:42

## 2020-12-30 RX ADMIN — SODIUM CHLORIDE 50 MILLILITER(S): 9 INJECTION INTRAMUSCULAR; INTRAVENOUS; SUBCUTANEOUS at 09:27

## 2020-12-30 RX ADMIN — SENNA PLUS 2 TABLET(S): 8.6 TABLET ORAL at 21:42

## 2020-12-30 RX ADMIN — CEFEPIME 100 MILLIGRAM(S): 1 INJECTION, POWDER, FOR SOLUTION INTRAMUSCULAR; INTRAVENOUS at 09:28

## 2020-12-30 RX ADMIN — ATORVASTATIN CALCIUM 20 MILLIGRAM(S): 80 TABLET, FILM COATED ORAL at 21:41

## 2020-12-30 RX ADMIN — MONTELUKAST 10 MILLIGRAM(S): 4 TABLET, CHEWABLE ORAL at 11:27

## 2020-12-30 RX ADMIN — PANTOPRAZOLE SODIUM 40 MILLIGRAM(S): 20 TABLET, DELAYED RELEASE ORAL at 11:27

## 2020-12-30 RX ADMIN — CLOPIDOGREL BISULFATE 75 MILLIGRAM(S): 75 TABLET, FILM COATED ORAL at 11:27

## 2020-12-30 RX ADMIN — SODIUM CHLORIDE 75 MILLILITER(S): 9 INJECTION INTRAMUSCULAR; INTRAVENOUS; SUBCUTANEOUS at 16:05

## 2020-12-30 RX ADMIN — LAMOTRIGINE 100 MILLIGRAM(S): 25 TABLET, ORALLY DISINTEGRATING ORAL at 11:27

## 2020-12-30 RX ADMIN — FENTANYL CITRATE 4.13 MICROGRAM(S)/KG/HR: 50 INJECTION INTRAVENOUS at 12:27

## 2020-12-30 RX ADMIN — CEFEPIME 100 MILLIGRAM(S): 1 INJECTION, POWDER, FOR SOLUTION INTRAMUSCULAR; INTRAVENOUS at 01:11

## 2020-12-30 RX ADMIN — Medication 81 MILLIGRAM(S): at 11:27

## 2020-12-30 RX ADMIN — Medication 1 TABLET(S): at 11:27

## 2020-12-30 RX ADMIN — PROPOFOL 4.96 MICROGRAM(S)/KG/MIN: 10 INJECTION, EMULSION INTRAVENOUS at 09:27

## 2020-12-30 RX ADMIN — CHLORHEXIDINE GLUCONATE 15 MILLILITER(S): 213 SOLUTION TOPICAL at 01:07

## 2020-12-30 RX ADMIN — GABAPENTIN 300 MILLIGRAM(S): 400 CAPSULE ORAL at 15:26

## 2020-12-30 RX ADMIN — Medication 100 MILLIGRAM(S): at 17:35

## 2020-12-30 RX ADMIN — Medication 250 MILLIGRAM(S): at 05:06

## 2020-12-30 RX ADMIN — CHLORHEXIDINE GLUCONATE 15 MILLILITER(S): 213 SOLUTION TOPICAL at 17:35

## 2020-12-30 RX ADMIN — CHLORHEXIDINE GLUCONATE 15 MILLILITER(S): 213 SOLUTION TOPICAL at 05:05

## 2020-12-30 NOTE — CHART NOTE - NSCHARTNOTEFT_GEN_A_CORE
Patient's daughter, Quin, was updated with patient's current clinical status at [phone].  She requested to be called for any updates.  All questions answered.

## 2020-12-30 NOTE — PROGRESS NOTE ADULT - ATTENDING COMMENTS
#Progress Note Handoff  Pending (specify):  improvement   Family discussion: dw with family and agreed to plan  Disposition: Home___/SNF___/Other___/Unknown at this time__x_  Pt seen during COVID 19 Pandemic.

## 2020-12-30 NOTE — CONSULT NOTE ADULT - ASSESSMENT
ASSESSMENT  Ms Henderson is a 76yo woman with medical history of COPD on 5L home oxygen, paroxysmal afib on pradaxa, carotid stenosis (L 80%, R 40%) s/p L carotid stenting recently , HFpEF (EF 70% Dec 2020), HTN, HLD, bipolar disorder, left parotid mass, recently admitted between 12/7-12/11 for COVID PNA s/p steroids/RDV presented to the ED with chest pain.    Acute Chronic respiratory failure 2/2 likely due to COVID PNA with septic Shock    Chest pain- likely pleuritic from COVID  Paroxysmal atrial fibrillation w/ RVR    Left ICA s/p TCAR (transcarotid artery revascularization) 12/04 with TIA last admission   Chronic HFpEF/HTN/HDL  PLAN  check vitamin D and Zinc level  OGT feed change to REPLETE [id number] feeds and 125ml at 10pm that is 1375kcal/88 gm of protein +132kcal from propofol  check bmp/phis /mg and correct lytes    ASSESSMENT:  - acute hypoxemic resp failure  - covid 19 + pneumonia  - h/o COPD on home O2  - L parotid mass  - h/o A fib with RVR  - anemia  - borderline low phos level    PLAN:  - obtain phos with daily am labs; replete to > 3.5  - check 25oh vitamin D and Zinc levels  - change OG feeds to REPLETE [id number] feeds/d and 125ml at 10pm --> 1375kcal and 88 gm of protein +132kcal from propofol = 1507 k/d, which meets estimated REE by PSE  - use of formula that is 25% protein and low n6:3 per CCM/ ASPEN recs for intubated covid pts  will follow

## 2020-12-30 NOTE — CONSULT NOTE ADULT - SUBJECTIVE AND OBJECTIVE BOX
74yo female pmhx of COPD on 5L home oxygen, paroxysmal afib on pradaxa, carotid stenosis (L 80%, R 40%) s/p L carotid stenting recently , HFpEF (EF 70% Dec 2020), HTN, HLD, bipolar disorder ,left parotid mass ,  recently admitted between 12/7-12/11 for COVID PNA s/p steroids/RDV presented to the ED with chest pain , sharp in character , 8/10 intensity, non radiating, post tussive. Patient mentioned she has chronic non productive cough but yesterday afternoon she had pretty bad cough giving her chest pain. She also mentions she had 1 episode of subjective fever yesterday. Denies nausea/vomiting/chills/diarrhea/dysuria/urgency.   ED Course: initially vitally stable but she became hypotensive to 70/50 s/p 3L bolus >>remained hypotensive was started on low dose levo. Labs: BUN/Cr: 39/1.6, lactate :3, UA + Nitrite LE+ admitted for urosepsis and JOYCE   (23 Dec 2020 01:50)  PT is now vented sedated on propofol . propofol dosage ~132 kcal    PAST MEDICAL & SURGICAL HISTORY:  Falls  Congestive heart failure  Transient ischemic attack  FLAVIO on CPAP  Bipolar 1 disorder  Allergic reaction  PVD (peripheral vascular disease)  Other emphysema  Other cardiomyopathy  TIA (transient ischemic attack)  COPD (chronic obstructive pulmonary disease)  Depression  Hypertension  History of cholecystectomy     ICU Vital Signs Last 24 Hrs  T(C): 36.6 (30 Dec 2020 08:00), Max: 38.6 (29 Dec 2020 20:00)  T(F): 97.8 (30 Dec 2020 08:00), Max: 101.4 (29 Dec 2020 20:00)  HR: 69 (30 Dec 2020 10:00) (60 - 128)  BP: 90/54 (30 Dec 2020 12:30) (76/46 - 229/122)  BP(mean): 66 (30 Dec 2020 12:30) (0 - 110)  ABP: 99/56 (29 Dec 2020 18:25) (99/56 - 164/84)  RR: 18 (30 Dec 2020 10:00) (14 - 30)  SpO2: 100% (30 Dec 2020 10:00) (93% - 100%)  Height (cm): 160 (12-23-20 @ 04:25), 157.5 (12-06-20 @ 10:03), 157.5 (10-17-20 @ 16:20)  Weight (kg): 82.599 (12-23-20 @ 04:25), 84.4 (12-04-20 @ 14:30), 81.4 (10-17-20 @ 16:20)  BMI (kg/m2): 32.3 (12-23-20 @ 04:25), 34 (12-06-20 @ 10:03), 34 (12-04-20 @ 14:30)  BSA (m2): 1.86 (12-23-20 @ 04:25), 1.85 (12-06-20 @ 10:03), 1.85 (12-04-20 @ 14:30)  I&O's Detail    29 Dec 2020 07:01  -  30 Dec 2020 07:00  --------------------------------------------------------  IN:    Dexmedetomidine: 285.5 mL    FentaNYL: 124.3 mL    Norepinephrine: 74.9 mL    sodium chloride 0.9%: 200 mL  Total IN: 684.7 mL    OUT:    Indwelling Catheter - Urethral (mL): 1045 mL  Total OUT: 1045 mL    Total NET: -360.3 mL      30 Dec 2020 07:01  -  30 Dec 2020 12:52  --------------------------------------------------------  IN:    Dexmedetomidine: 45.5 mL    FentaNYL: 74.4 mL    IV PiggyBack: 50 mL    Norepinephrine: 13.1 mL    Propofol: 20 mL    sodium chloride 0.9%: 300 mL  Total IN: 503 mL    OUT:    Indwelling Catheter - Urethral (mL): 275 mL    Phenylephrine: 0 mL    Propofol: 0 mL  Total OUT: 275 mL    Total NET: 228 mL    Mode: AC/ CMV (Assist Control/ Continuous Mandatory Ventilation)  RR (machine): 18  TV (machine): 350  FiO2: 40  PEEP: 8  ITime: 1  MAP: 12  PIP: 22    PHYSICAL EXAM:  GENERAL: remain vented /sedated on propofol  HEENT:  Moist mucous membranes ,OGT in place  ABDOMEN: Soft, n/d n/t  EXTREMITIES:  no edema  SKIN: No lesions  IV ACCESS:  FEEDING ACCESS:OGT    MEDICATIONS  (STANDING):  aspirin  chewable 81 milliGRAM(s) Oral daily  atorvastatin 20 milliGRAM(s) Oral at bedtime  budesonide 160 MICROgram(s)/formoterol 4.5 MICROgram(s) Inhaler 2 Puff(s) Inhalation two times a day  cefepime   IVPB 1000 milliGRAM(s) IV Intermittent every 12 hours  chlorhexidine 0.12% Liquid 15 milliLiter(s) Oral Mucosa every 12 hours  clopidogrel Tablet 75 milliGRAM(s) Oral daily  dabigatran 150 milliGRAM(s) Oral every 12 hours  fentaNYL   Infusion. 0.5 MICROgram(s)/kG/Hr (4.13 mL/Hr) IV Continuous <Continuous>  gabapentin 300 milliGRAM(s) Oral three times a day  lamoTRIgine 100 milliGRAM(s) Oral daily  metoprolol tartrate 100 milliGRAM(s) Oral every 12 hours  montelukast 10 milliGRAM(s) Oral daily  multivitamin 1 Tablet(s) Oral daily  norepinephrine Infusion 0.1 MICROgram(s)/kG/Min (15.5 mL/Hr) IV Continuous <Continuous>  pantoprazole    Tablet 40 milliGRAM(s) Oral before breakfast  polyethylene glycol 3350 17 Gram(s) Oral daily  propofol Infusion 10 MICROgram(s)/kG/Min (4.96 mL/Hr) IV Continuous <Continuous>  QUEtiapine 200 milliGRAM(s) Oral at bedtime  senna 2 Tablet(s) Oral at bedtime  sodium chloride 0.9%. 1000 milliLiter(s) (75 mL/Hr) IV Continuous <Continuous>  sodium chloride 0.9%. 1000 milliLiter(s) (50 mL/Hr) IV Continuous <Continuous>  tiotropium 18 MICROgram(s) Capsule 1 Capsule(s) Inhalation daily    MEDICATIONS  (PRN):  acetaminophen  Suppository .. 650 milliGRAM(s) Rectal every 6 hours PRN Temp greater or equal to 38C (100.4F)  ALBUTerol    90 MICROgram(s) HFA Inhaler 1 Puff(s) Inhalation every 4 hours PRN Shortness of Breath  aluminum hydroxide/magnesium hydroxide/simethicone Suspension 30 milliLiter(s) Oral every 4 hours PRN Dyspepsia  guaifenesin/dextromethorphan  Syrup 10 milliLiter(s) Oral every 6 hours PRN Cough  LORazepam     Tablet 0.5 milliGRAM(s) Oral two times a day PRN Anxiety    Allergies    codeine (Other (Moderate))  Depakote (Unknown)  Dilaudid (Short breath; Rash)  IV Contrast (Anaphylaxis)  losartan (Angioedema)  Risperdal (Other)  verapamil (Short breath; Angioedema)    LABS:    12-30    141  |  110  |  16  ----------------------------<  117<H>  4.6   |  22  |  0.8    Ca    8.0<L>      30 Dec 2020 04:30  Phos  2.8     12-30  Mg     1.9     12-30    TPro  5.1<L>  /  Alb  2.5<L>  /  TBili  0.5  /  DBili  x   /  AST  24  /  ALT  15  /  AlkPhos  100  12-30                          8.3    4.62  )-----------( 176      ( 30 Dec 2020 04:30 )             26.9   CAPILLARY BLOOD GLUCOSE  POCT Blood Glucose.: 96 mg/dL (29 Dec 2020 13:25)    ABG - ( 30 Dec 2020 06:32 )  pH, Arterial: 7.37  pH, Blood: x     /  pCO2: 41    /  pO2: 180   / HCO3: 24    / Base Excess: -1.5  /  SaO2: 100       RADIOLOGY:  < from: Xray Chest 1 View- PORTABLE-Urgent (Xray Chest 1 View- PORTABLE-Urgent .) (12.30.20 @ 10:45) >  Impression:  New NG tube coursing below left hemidiaphragm out of the field-of-view.  Left greater than right bilateral opacities increased in density since the prior exam. No pneumothorax.     76yo female pmhx of COPD on 5L home oxygen, paroxysmal afib on pradaxa, carotid stenosis (L 80%, R 40%) s/p L carotid stenting recently , HFpEF (EF 70% Dec 2020), HTN, HLD, bipolar disorder ,left parotid mass ,  recently admitted between 12/7-12/11 for COVID PNA s/p steroids/RDV presented to the ED on 12/23 with chest pain , sharp in character , 8/10 intensity, non radiating, post tussive. Patient mentioned she has chronic non productive cough but yesterday afternoon she had pretty bad cough giving her chest pain. She also mentions she had 1 episode of subjective fever yesterday. Denies nausea/vomiting/chills/diarrhea/dysuria/urgency.   ED Course: initially vitally stable but she became hypotensive to 70/50 s/p 3L bolus >>remained hypotensive was started on low dose levo. Labs: BUN/Cr: 39/1.6, lactate :3, UA + Nitrite LE+ admitted for urosepsis and JOYCE  still covid 19 + on 12/23  PT is now vented sedated on propofol . propofol dosage ~132 kcal/ 24h    PAST MEDICAL & SURGICAL HISTORY:  Falls  Congestive heart failure  Transient ischemic attack  FLAVIO on CPAP  Bipolar 1 disorder  Allergic reaction  PVD (peripheral vascular disease)  Other emphysema  Other cardiomyopathy  TIA (transient ischemic attack)  COPD (chronic obstructive pulmonary disease)  Depression  Hypertension  History of cholecystectomy     ICU Vital Signs Last 24 Hrs  T(C): 36.6 (30 Dec 2020 08:00), Max: 38.6 (29 Dec 2020 20:00)  T(F): 97.8 (30 Dec 2020 08:00), Max: 101.4 (29 Dec 2020 20:00)  HR: 69 (30 Dec 2020 10:00) (60 - 128)  BP: 90/54 (30 Dec 2020 12:30) (76/46 - 229/122)  BP(mean): 66 (30 Dec 2020 12:30) (0 - 110)  ABP: 99/56 (29 Dec 2020 18:25) (99/56 - 164/84)  RR: 18 (30 Dec 2020 10:00) (14 - 30)  SpO2: 100% (30 Dec 2020 10:00) (93% - 100%)  Height (cm): 160 (12-23-20 @ 04:25), 157.5 (12-06-20 @ 10:03), 157.5 (10-17-20 @ 16:20)  Weight (kg): 82.599 (12-23-20 @ 04:25), 84.4 (12-04-20 @ 14:30), 81.4 (10-17-20 @ 16:20)  BMI (kg/m2): 32.3 (12-23-20 @ 04:25), 34 (12-06-20 @ 10:03), 34 (12-04-20 @ 14:30)  BSA (m2): 1.86 (12-23-20 @ 04:25), 1.85 (12-06-20 @ 10:03), 1.85 (12-04-20 @ 14:30)  I&O's Detail    29 Dec 2020 07:01  -  30 Dec 2020 07:00  --------------------------------------------------------  IN:    Dexmedetomidine: 285.5 mL    FentaNYL: 124.3 mL    Norepinephrine: 74.9 mL    sodium chloride 0.9%: 200 mL  Total IN: 684.7 mL    OUT:    Indwelling Catheter - Urethral (mL): 1045 mL  Total OUT: 1045 mL    Total NET: -360.3 mL      30 Dec 2020 07:01  -  30 Dec 2020 12:52  --------------------------------------------------------  IN:    Dexmedetomidine: 45.5 mL    FentaNYL: 74.4 mL    IV PiggyBack: 50 mL    Norepinephrine: 13.1 mL    Propofol: 20 mL    sodium chloride 0.9%: 300 mL  Total IN: 503 mL    OUT:    Indwelling Catheter - Urethral (mL): 275 mL    Phenylephrine: 0 mL    Propofol: 0 mL  Total OUT: 275 mL    Total NET: 228 mL    Mode: AC/ CMV (Assist Control/ Continuous Mandatory Ventilation)  RR (machine): 18  TV (machine): 350  FiO2: 40  PEEP: 8  ITime: 1  MAP: 12  PIP: 22    PHYSICAL EXAM:  GENERAL: remains vented /sedated on propofol, requiring levo  HEENT:  Moist mucous membranes ,OGT in place  ABDOMEN: Soft, n/d n/t  EXTREMITIES:  no edema  SKIN: No lesions  IV ACCESS: peripheral  FEEDING ACCESS: OGT    MEDICATIONS  (STANDING):  aspirin  chewable 81 milliGRAM(s) Oral daily  atorvastatin 20 milliGRAM(s) Oral at bedtime  budesonide 160 MICROgram(s)/formoterol 4.5 MICROgram(s) Inhaler 2 Puff(s) Inhalation two times a day  cefepime   IVPB 1000 milliGRAM(s) IV Intermittent every 12 hours  chlorhexidine 0.12% Liquid 15 milliLiter(s) Oral Mucosa every 12 hours  clopidogrel Tablet 75 milliGRAM(s) Oral daily  dabigatran 150 milliGRAM(s) Oral every 12 hours  fentaNYL   Infusion. 0.5 MICROgram(s)/kG/Hr (4.13 mL/Hr) IV Continuous <Continuous>  gabapentin 300 milliGRAM(s) Oral three times a day  lamoTRIgine 100 milliGRAM(s) Oral daily  metoprolol tartrate 100 milliGRAM(s) Oral every 12 hours  montelukast 10 milliGRAM(s) Oral daily  multivitamin 1 Tablet(s) Oral daily  norepinephrine Infusion 0.1 MICROgram(s)/kG/Min (15.5 mL/Hr) IV Continuous <Continuous>  pantoprazole    Tablet 40 milliGRAM(s) Oral before breakfast  polyethylene glycol 3350 17 Gram(s) Oral daily  propofol Infusion 10 MICROgram(s)/kG/Min (4.96 mL/Hr) IV Continuous <Continuous>  QUEtiapine 200 milliGRAM(s) Oral at bedtime  senna 2 Tablet(s) Oral at bedtime  sodium chloride 0.9%. 1000 milliLiter(s) (75 mL/Hr) IV Continuous <Continuous>  sodium chloride 0.9%. 1000 milliLiter(s) (50 mL/Hr) IV Continuous <Continuous>  tiotropium 18 MICROgram(s) Capsule 1 Capsule(s) Inhalation daily    MEDICATIONS  (PRN):  acetaminophen  Suppository .. 650 milliGRAM(s) Rectal every 6 hours PRN Temp greater or equal to 38C (100.4F)  ALBUTerol    90 MICROgram(s) HFA Inhaler 1 Puff(s) Inhalation every 4 hours PRN Shortness of Breath  aluminum hydroxide/magnesium hydroxide/simethicone Suspension 30 milliLiter(s) Oral every 4 hours PRN Dyspepsia  guaifenesin/dextromethorphan  Syrup 10 milliLiter(s) Oral every 6 hours PRN Cough  LORazepam     Tablet 0.5 milliGRAM(s) Oral two times a day PRN Anxiety    Allergies    codeine (Other (Moderate))  Depakote (Unknown)  Dilaudid (Short breath; Rash)  IV Contrast (Anaphylaxis)  losartan (Angioedema)  Risperdal (Other)  verapamil (Short breath; Angioedema)    LABS:    12-30    141  |  110  |  16  ----------------------------<  117<H>  4.6   |  22  |  0.8    Ca    8.0<L>      30 Dec 2020 04:30  Phos  2.8     12-30  Mg     1.9     12-30    TPro  5.1<L>  /  Alb  2.5<L>  /  TBili  0.5  /  DBili  x   /  AST  24  /  ALT  15  /  AlkPhos  100  12-30                          8.3    4.62  )-----------( 176      ( 30 Dec 2020 04:30 )             26.9   CAPILLARY BLOOD GLUCOSE  POCT Blood Glucose.: 96 mg/dL (29 Dec 2020 13:25)    ABG - ( 30 Dec 2020 06:32 )  pH, Arterial: 7.37  pH, Blood: x     /  pCO2: 41    /  pO2: 180   / HCO3: 24    / Base Excess: -1.5  /  SaO2: 100       RADIOLOGY:  < from: Xray Chest 1 View- PORTABLE-Urgent (Xray Chest 1 View- PORTABLE-Urgent .) (12.30.20 @ 10:45) >  Impression:  New NG tube coursing below left hemidiaphragm out of the field-of-view.  Left greater than right bilateral opacities increased in density since the prior exam. No pneumothorax.

## 2020-12-30 NOTE — CONSULT NOTE ADULT - SUBJECTIVE AND OBJECTIVE BOX
HPI:  74yo female pmhx of COPD on 5L home oxygen, paroxysmal afib on pradaxa, carotid stenosis (L 80%, R 40%) s/p L carotid stenting recently , HFpEF (EF 70% Dec 2020), HTN, HLD, bipolar disorder ,left parotid mass ,  recently admitted between 12/7-12/11 for COVID PNA s/p steroids/RDV presented to the ED with chest pain , sharp in character , 8/10 intensity, non radiating, post tussive. Patient mentioned she has chronic non productive cough but yesterday afternoon she had pretty bad cough giving her chest pain. She also mentions she had 1 episode of subjective fever yesterday. Denies nausea/vomiting/chills/diarrhea/dysuria/urgency.       ED Course: initially vitally stable but she became hypotensive to 70/50 s/p 3L bolus >>remained hypotensive was started on low dose levo. Labs: BUN/Cr: 39/1.6, lactate :3, UA + Nitrite LE+ admitted for urosepsis and JOYCE   (23 Dec 2020 01:50)      PAST MEDICAL & SURGICAL HISTORY  Falls    Congestive heart failure    Transient ischemic attack    FLAVIO on CPAP    Bipolar 1 disorder    Allergic reaction    PVD (peripheral vascular disease)    Other emphysema    Other cardiomyopathy    TIA (transient ischemic attack)    COPD (chronic obstructive pulmonary disease)    Depression    Hypertension    History of cholecystectomy        FAMILY HISTORY:  FAMILY HISTORY:  No pertinent family history in first degree relatives        SOCIAL HISTORY:  []smoker  []Alcohol  []Drug    ALLERGIES:  codeine (Other (Moderate))  Depakote (Unknown)  Dilaudid (Short breath; Rash)  IV Contrast (Anaphylaxis)  losartan (Angioedema)  Risperdal (Other)  verapamil (Short breath; Angioedema)      MEDICATIONS:  MEDICATIONS  (STANDING):  aspirin  chewable 81 milliGRAM(s) Oral daily  atorvastatin 20 milliGRAM(s) Oral at bedtime  budesonide 160 MICROgram(s)/formoterol 4.5 MICROgram(s) Inhaler 2 Puff(s) Inhalation two times a day  clopidogrel Tablet 75 milliGRAM(s) Oral daily  dabigatran 150 milliGRAM(s) Oral every 12 hours  fentaNYL   Infusion. 0.5 MICROgram(s)/kG/Hr (4.13 mL/Hr) IV Continuous <Continuous>  gabapentin 300 milliGRAM(s) Oral three times a day  lamoTRIgine 100 milliGRAM(s) Oral daily  metoprolol tartrate 100 milliGRAM(s) Oral every 12 hours  montelukast 10 milliGRAM(s) Oral daily  multivitamin 1 Tablet(s) Oral daily  norepinephrine Infusion 0.1 MICROgram(s)/kG/Min (15.5 mL/Hr) IV Continuous <Continuous>  pantoprazole    Tablet 40 milliGRAM(s) Oral before breakfast  polyethylene glycol 3350 17 Gram(s) Oral daily  propofol Infusion 10 MICROgram(s)/kG/Min (4.96 mL/Hr) IV Continuous <Continuous>  QUEtiapine 200 milliGRAM(s) Oral at bedtime  senna 2 Tablet(s) Oral at bedtime  sodium chloride 0.9%. 1000 milliLiter(s) (75 mL/Hr) IV Continuous <Continuous>  tiotropium 18 MICROgram(s) Capsule 1 Capsule(s) Inhalation daily    MEDICATIONS  (PRN):  acetaminophen  Suppository .. 650 milliGRAM(s) Rectal every 6 hours PRN Temp greater or equal to 38C (100.4F)  ALBUTerol    90 MICROgram(s) HFA Inhaler 1 Puff(s) Inhalation every 4 hours PRN Shortness of Breath  aluminum hydroxide/magnesium hydroxide/simethicone Suspension 30 milliLiter(s) Oral every 4 hours PRN Dyspepsia  guaifenesin/dextromethorphan  Syrup 10 milliLiter(s) Oral every 6 hours PRN Cough  LORazepam     Tablet 0.5 milliGRAM(s) Oral two times a day PRN Anxiety      HOME MEDICATIONS:  Home Medications:  amLODIPine 2.5 mg oral tablet: 1 tab(s) orally once a day (29 Nov 2020 16:46)  aspirin 81 mg oral tablet: 1 tab(s) orally once a day (29 Nov 2020 16:46)  atorvastatin 20 mg oral tablet: 1 tab(s) orally once a day (29 Nov 2020 16:46)  budesonide-formoterol 160 mcg-4.5 mcg/inh inhalation aerosol: 2 puff(s) inhaled 2 times a day (29 Nov 2020 16:46)  furosemide 40 mg oral tablet: 1 tab(s) orally once a day (29 Nov 2020 16:46)  lamoTRIgine 100 mg oral tablet, extended release: 1 tab(s) orally once a day (29 Nov 2020 16:46)  LORazepam 0.5 mg oral tablet: 1 tab(s) orally 2 times a day, As Needed (29 Nov 2020 16:46)  Metoprolol Tartrate 100 mg oral tablet: 1 tab(s) orally 2 times a day (29 Nov 2020 16:46)  montelukast 10 mg oral tablet: 1 tab(s) orally once a day (29 Nov 2020 16:46)  Pepcid 20 mg oral tablet: 1 tab(s) orally once a day (29 Nov 2020 16:46)  Plavix 75 mg oral tablet: 1 tab(s) orally once a day (29 Nov 2020 16:46)  QUEtiapine 200 mg oral tablet: 1 tab(s) orally once a day (at bedtime) (29 Nov 2020 16:46)  tiotropium 18 mcg inhalation capsule: 1 cap(s) inhaled once a day (02 Dec 2020 13:51)      VITALS:   T(F): 99.7 (12-30 @ 16:00), Max: 101.4 (12-29 @ 20:00)  HR: 65 (12-30 @ 16:00) (60 - 128)  BP: 92/50 (12-30 @ 16:00) (76/46 - 229/122)  BP(mean): 68 (12-30 @ 16:00) (0 - 113)  RR: 23 (12-30 @ 16:00) (14 - 50)  SpO2: 96% (12-30 @ 16:00) (93% - 100%)    I&O's Summary    29 Dec 2020 07:01  -  30 Dec 2020 07:00  --------------------------------------------------------  IN: 684.7 mL / OUT: 1045 mL / NET: -360.3 mL    30 Dec 2020 07:01  -  30 Dec 2020 16:10  --------------------------------------------------------  IN: 1170.2 mL / OUT: 320 mL / NET: 850.2 mL        REVIEW OF SYSTEMS:  patient is intubated    PHYSICAL EXAM:  NEURO: intubated and sedated  GEN: Not in acute distress  NECK: no thyroid enlargement, no JVD  LUNGS: Clear to auscultation bilaterally   CARDIOVASCULAR: S1/S2 present, RRR , no murmurs or rubs, no carotid bruits,  + PP bilaterally  ABD: Soft, non-tender, non-distended, +BS  EXT: No DEVAN  SKIN: Intact    LABS:                        8.3    4.62  )-----------( 176      ( 30 Dec 2020 04:30 )             26.9     12-30    141  |  110  |  16  ----------------------------<  117<H>  4.6   |  22  |  0.8    Ca    8.0<L>      30 Dec 2020 04:30  Phos  2.8     12-30  Mg     1.9     12-30    TPro  5.1<L>  /  Alb  2.5<L>  /  TBili  0.5  /  DBili  x   /  AST  24  /  ALT  15  /  AlkPhos  100  12-30              Troponin trend:            RADIOLOGY:  -CXR:  -TTE:  -CCTA:  -STRESS TEST:  -CATHETERIZATION:    ECG:    TELEMETRY EVENTS:   HPI:  76yo female pmhx of COPD on 5L home oxygen, paroxysmal afib on pradaxa, carotid stenosis (L 80%, R 40%) s/p L carotid stenting recently , HFpEF (EF 70% Dec 2020), HTN, HLD, bipolar disorder ,left parotid mass ,  recently admitted between 12/7-12/11 for COVID PNA s/p steroids/RDV presented to the ED with chest pain , sharp in character , 8/10 intensity, non radiating, post tussive. Patient mentioned she has chronic non productive cough but yesterday afternoon she had pretty bad cough giving her chest pain. She also mentions she had 1 episode of subjective fever yesterday. Denies nausea/vomiting/chills/diarrhea/dysuria/urgency.       ED Course: initially vitally stable but she became hypotensive to 70/50 s/p 3L bolus >>remained hypotensive was started on low dose levo. Labs: BUN/Cr: 39/1.6, lactate :3, UA + Nitrite LE+ admitted for urosepsis and JOYCE   (23 Dec 2020 01:50)      PAST MEDICAL & SURGICAL HISTORY  Falls    Congestive heart failure    Transient ischemic attack    FLAVIO on CPAP    Bipolar 1 disorder    Allergic reaction    PVD (peripheral vascular disease)    Other emphysema    Other cardiomyopathy    TIA (transient ischemic attack)    COPD (chronic obstructive pulmonary disease)    Depression    Hypertension    History of cholecystectomy        FAMILY HISTORY:  FAMILY HISTORY:  No pertinent family history in first degree relatives        SOCIAL HISTORY:  []smoker  []Alcohol  []Drug    ALLERGIES:  codeine (Other (Moderate))  Depakote (Unknown)  Dilaudid (Short breath; Rash)  IV Contrast (Anaphylaxis)  losartan (Angioedema)  Risperdal (Other)  verapamil (Short breath; Angioedema)      MEDICATIONS:  MEDICATIONS  (STANDING):  aspirin  chewable 81 milliGRAM(s) Oral daily  atorvastatin 20 milliGRAM(s) Oral at bedtime  budesonide 160 MICROgram(s)/formoterol 4.5 MICROgram(s) Inhaler 2 Puff(s) Inhalation two times a day  clopidogrel Tablet 75 milliGRAM(s) Oral daily  dabigatran 150 milliGRAM(s) Oral every 12 hours  fentaNYL   Infusion. 0.5 MICROgram(s)/kG/Hr (4.13 mL/Hr) IV Continuous <Continuous>  gabapentin 300 milliGRAM(s) Oral three times a day  lamoTRIgine 100 milliGRAM(s) Oral daily  metoprolol tartrate 100 milliGRAM(s) Oral every 12 hours  montelukast 10 milliGRAM(s) Oral daily  multivitamin 1 Tablet(s) Oral daily  norepinephrine Infusion 0.1 MICROgram(s)/kG/Min (15.5 mL/Hr) IV Continuous <Continuous>  pantoprazole    Tablet 40 milliGRAM(s) Oral before breakfast  polyethylene glycol 3350 17 Gram(s) Oral daily  propofol Infusion 10 MICROgram(s)/kG/Min (4.96 mL/Hr) IV Continuous <Continuous>  QUEtiapine 200 milliGRAM(s) Oral at bedtime  senna 2 Tablet(s) Oral at bedtime  sodium chloride 0.9%. 1000 milliLiter(s) (75 mL/Hr) IV Continuous <Continuous>  tiotropium 18 MICROgram(s) Capsule 1 Capsule(s) Inhalation daily    MEDICATIONS  (PRN):  acetaminophen  Suppository .. 650 milliGRAM(s) Rectal every 6 hours PRN Temp greater or equal to 38C (100.4F)  ALBUTerol    90 MICROgram(s) HFA Inhaler 1 Puff(s) Inhalation every 4 hours PRN Shortness of Breath  aluminum hydroxide/magnesium hydroxide/simethicone Suspension 30 milliLiter(s) Oral every 4 hours PRN Dyspepsia  guaifenesin/dextromethorphan  Syrup 10 milliLiter(s) Oral every 6 hours PRN Cough  LORazepam     Tablet 0.5 milliGRAM(s) Oral two times a day PRN Anxiety      HOME MEDICATIONS:  Home Medications:  amLODIPine 2.5 mg oral tablet: 1 tab(s) orally once a day (29 Nov 2020 16:46)  aspirin 81 mg oral tablet: 1 tab(s) orally once a day (29 Nov 2020 16:46)  atorvastatin 20 mg oral tablet: 1 tab(s) orally once a day (29 Nov 2020 16:46)  budesonide-formoterol 160 mcg-4.5 mcg/inh inhalation aerosol: 2 puff(s) inhaled 2 times a day (29 Nov 2020 16:46)  furosemide 40 mg oral tablet: 1 tab(s) orally once a day (29 Nov 2020 16:46)  lamoTRIgine 100 mg oral tablet, extended release: 1 tab(s) orally once a day (29 Nov 2020 16:46)  LORazepam 0.5 mg oral tablet: 1 tab(s) orally 2 times a day, As Needed (29 Nov 2020 16:46)  Metoprolol Tartrate 100 mg oral tablet: 1 tab(s) orally 2 times a day (29 Nov 2020 16:46)  montelukast 10 mg oral tablet: 1 tab(s) orally once a day (29 Nov 2020 16:46)  Pepcid 20 mg oral tablet: 1 tab(s) orally once a day (29 Nov 2020 16:46)  Plavix 75 mg oral tablet: 1 tab(s) orally once a day (29 Nov 2020 16:46)  QUEtiapine 200 mg oral tablet: 1 tab(s) orally once a day (at bedtime) (29 Nov 2020 16:46)  tiotropium 18 mcg inhalation capsule: 1 cap(s) inhaled once a day (02 Dec 2020 13:51)      VITALS:   T(F): 99.7 (12-30 @ 16:00), Max: 101.4 (12-29 @ 20:00)  HR: 65 (12-30 @ 16:00) (60 - 128)  BP: 92/50 (12-30 @ 16:00) (76/46 - 229/122)  BP(mean): 68 (12-30 @ 16:00) (0 - 113)  RR: 23 (12-30 @ 16:00) (14 - 50)  SpO2: 96% (12-30 @ 16:00) (93% - 100%)    I&O's Summary    29 Dec 2020 07:01  -  30 Dec 2020 07:00  --------------------------------------------------------  IN: 684.7 mL / OUT: 1045 mL / NET: -360.3 mL    30 Dec 2020 07:01  -  30 Dec 2020 16:10  --------------------------------------------------------  IN: 1170.2 mL / OUT: 320 mL / NET: 850.2 mL        REVIEW OF SYSTEMS:  patient is intubated    PHYSICAL EXAM:  NEURO: intubated and sedated  GEN: Not in acute distress  NECK: no thyroid enlargement, no JVD  LUNGS: Clear to auscultation bilaterally   CARDIOVASCULAR: S1/S2 present, RRR (currently in NSR) , no murmurs or rubs, no carotid bruits,  + PP bilaterally  ABD: Soft, non-tender, non-distended, +BS  EXT: No DEVAN  SKIN: Intact    LABS:                        8.3    4.62  )-----------( 176      ( 30 Dec 2020 04:30 )             26.9     12-30    141  |  110  |  16  ----------------------------<  117<H>  4.6   |  22  |  0.8    Ca    8.0<L>      30 Dec 2020 04:30  Phos  2.8     12-30  Mg     1.9     12-30    TPro  5.1<L>  /  Alb  2.5<L>  /  TBili  0.5  /  DBili  x   /  AST  24  /  ALT  15  /  AlkPhos  100  12-30              Troponin trend:            RADIOLOGY:  -CXR:  -TTE:  -CCTA:  -STRESS TEST:  -CATHETERIZATION:    ECG:    TELEMETRY EVENTS:   HPI:  74yo female pmhx of COPD on 5L home oxygen, paroxysmal afib on pradaxa, carotid stenosis (L 80%, R 40%) s/p L carotid stenting recently , HFpEF (EF 70% Dec 2020), HTN, HLD, bipolar disorder ,left parotid mass ,  recently admitted between 12/7-12/11 for COVID PNA s/p steroids/RDV presented to the ED with chest pain , sharp in character , 8/10 intensity, non radiating, post tussive. Patient mentioned she has chronic non productive cough but yesterday afternoon she had pretty bad cough giving her chest pain. She also mentions she had 1 episode of subjective fever yesterday. Denies nausea/vomiting/chills/diarrhea/dysuria/urgency.     Today patient is intubated and sedated.  She is on levophed at 0.02 and propofol at 15.  She is in NSR at 70      PAST MEDICAL & SURGICAL HISTORY  Falls    Congestive heart failure    Transient ischemic attack    FLAVIO on CPAP    Bipolar 1 disorder    Allergic reaction    PVD (peripheral vascular disease)    Other emphysema    Other cardiomyopathy    TIA (transient ischemic attack)    COPD (chronic obstructive pulmonary disease)    Depression    Hypertension    History of cholecystectomy        FAMILY HISTORY:  FAMILY HISTORY:  No pertinent family history in first degree relatives        SOCIAL HISTORY:  []smoker  []Alcohol  []Drug    ALLERGIES:  codeine (Other (Moderate))  Depakote (Unknown)  Dilaudid (Short breath; Rash)  IV Contrast (Anaphylaxis)  losartan (Angioedema)  Risperdal (Other)  verapamil (Short breath; Angioedema)      MEDICATIONS:  MEDICATIONS  (STANDING):  aspirin  chewable 81 milliGRAM(s) Oral daily  atorvastatin 20 milliGRAM(s) Oral at bedtime  budesonide 160 MICROgram(s)/formoterol 4.5 MICROgram(s) Inhaler 2 Puff(s) Inhalation two times a day  clopidogrel Tablet 75 milliGRAM(s) Oral daily  dabigatran 150 milliGRAM(s) Oral every 12 hours  fentaNYL   Infusion. 0.5 MICROgram(s)/kG/Hr (4.13 mL/Hr) IV Continuous <Continuous>  gabapentin 300 milliGRAM(s) Oral three times a day  lamoTRIgine 100 milliGRAM(s) Oral daily  metoprolol tartrate 100 milliGRAM(s) Oral every 12 hours  montelukast 10 milliGRAM(s) Oral daily  multivitamin 1 Tablet(s) Oral daily  norepinephrine Infusion 0.1 MICROgram(s)/kG/Min (15.5 mL/Hr) IV Continuous <Continuous>  pantoprazole    Tablet 40 milliGRAM(s) Oral before breakfast  polyethylene glycol 3350 17 Gram(s) Oral daily  propofol Infusion 10 MICROgram(s)/kG/Min (4.96 mL/Hr) IV Continuous <Continuous>  QUEtiapine 200 milliGRAM(s) Oral at bedtime  senna 2 Tablet(s) Oral at bedtime  sodium chloride 0.9%. 1000 milliLiter(s) (75 mL/Hr) IV Continuous <Continuous>  tiotropium 18 MICROgram(s) Capsule 1 Capsule(s) Inhalation daily    MEDICATIONS  (PRN):  acetaminophen  Suppository .. 650 milliGRAM(s) Rectal every 6 hours PRN Temp greater or equal to 38C (100.4F)  ALBUTerol    90 MICROgram(s) HFA Inhaler 1 Puff(s) Inhalation every 4 hours PRN Shortness of Breath  aluminum hydroxide/magnesium hydroxide/simethicone Suspension 30 milliLiter(s) Oral every 4 hours PRN Dyspepsia  guaifenesin/dextromethorphan  Syrup 10 milliLiter(s) Oral every 6 hours PRN Cough  LORazepam     Tablet 0.5 milliGRAM(s) Oral two times a day PRN Anxiety      HOME MEDICATIONS:  Home Medications:  amLODIPine 2.5 mg oral tablet: 1 tab(s) orally once a day (29 Nov 2020 16:46)  aspirin 81 mg oral tablet: 1 tab(s) orally once a day (29 Nov 2020 16:46)  atorvastatin 20 mg oral tablet: 1 tab(s) orally once a day (29 Nov 2020 16:46)  budesonide-formoterol 160 mcg-4.5 mcg/inh inhalation aerosol: 2 puff(s) inhaled 2 times a day (29 Nov 2020 16:46)  furosemide 40 mg oral tablet: 1 tab(s) orally once a day (29 Nov 2020 16:46)  lamoTRIgine 100 mg oral tablet, extended release: 1 tab(s) orally once a day (29 Nov 2020 16:46)  LORazepam 0.5 mg oral tablet: 1 tab(s) orally 2 times a day, As Needed (29 Nov 2020 16:46)  Metoprolol Tartrate 100 mg oral tablet: 1 tab(s) orally 2 times a day (29 Nov 2020 16:46)  montelukast 10 mg oral tablet: 1 tab(s) orally once a day (29 Nov 2020 16:46)  Pepcid 20 mg oral tablet: 1 tab(s) orally once a day (29 Nov 2020 16:46)  Plavix 75 mg oral tablet: 1 tab(s) orally once a day (29 Nov 2020 16:46)  QUEtiapine 200 mg oral tablet: 1 tab(s) orally once a day (at bedtime) (29 Nov 2020 16:46)  tiotropium 18 mcg inhalation capsule: 1 cap(s) inhaled once a day (02 Dec 2020 13:51)      VITALS:   T(F): 99.7 (12-30 @ 16:00), Max: 101.4 (12-29 @ 20:00)  HR: 65 (12-30 @ 16:00) (60 - 128)  BP: 92/50 (12-30 @ 16:00) (76/46 - 229/122)  BP(mean): 68 (12-30 @ 16:00) (0 - 113)  RR: 23 (12-30 @ 16:00) (14 - 50)  SpO2: 96% (12-30 @ 16:00) (93% - 100%)    I&O's Summary    29 Dec 2020 07:01  -  30 Dec 2020 07:00  --------------------------------------------------------  IN: 684.7 mL / OUT: 1045 mL / NET: -360.3 mL    30 Dec 2020 07:01  -  30 Dec 2020 16:10  --------------------------------------------------------  IN: 1170.2 mL / OUT: 320 mL / NET: 850.2 mL        REVIEW OF SYSTEMS:  patient is intubated    PHYSICAL EXAM:  NEURO: intubated and sedated  GEN: Not in acute distress  NECK: no thyroid enlargement, no JVD  LUNGS: Clear to auscultation bilaterally   CARDIOVASCULAR: S1/S2 present, RRR (currently in NSR) , no murmurs or rubs, no carotid bruits,  + PP bilaterally  ABD: Soft, non-tender, non-distended, +BS  EXT: No DEVAN  SKIN: Intact    LABS:                        8.3    4.62  )-----------( 176      ( 30 Dec 2020 04:30 )             26.9     12-30    141  |  110  |  16  ----------------------------<  117<H>  4.6   |  22  |  0.8    Ca    8.0<L>      30 Dec 2020 04:30  Phos  2.8     12-30  Mg     1.9     12-30    TPro  5.1<L>  /  Alb  2.5<L>  /  TBili  0.5  /  DBili  x   /  AST  24  /  ALT  15  /  AlkPhos  100  12-30              Troponin trend:            RADIOLOGY:  -CXR:  -TTE:  -CCTA:  -STRESS TEST:  -CATHETERIZATION:    ECG:    TELEMETRY EVENTS:

## 2020-12-30 NOTE — CONSULT NOTE ADULT - ASSESSMENT
75 yoF pmhx of COPD on 5L home oxygen at home, paroxysmal afib on pradaxa, carotid stenosis (L 80%, R 40%) s/p L carotid stenting recently , HFpEF (EF 70% Dec 2020), HTN, HLD, bipolar disorder ,left parotid mass, TIA on aspirin and plavix,  recently admitted between 12/7-12/11 for COVID PNA s/p steroids/RDV presented to the ED with chest pain and acute hypoxemic respiratory failure.  Patient had to be intubated and started on levophed for septic shock during which she had afib with rvr to the 130s.  Today she is on low dose levophed with resolution of her afib with rvr.     75 yoF pmhx of COPD on 5L home oxygen at home, paroxysmal afib on pradaxa, carotid stenosis (L 80%, R 40%) s/p L carotid stenting recently , HFpEF (EF 70% Dec 2020), HTN, HLD, bipolar disorder ,left parotid mass, TIA on aspirin and plavix,  recently admitted between 12/7-12/11 for COVID PNA s/p steroids/RDV presented to the ED with chest pain and acute hypoxemic respiratory failure.  Patient had to be intubated and started on levophed for septic shock during which she had afib with rvr to the 130s.  Today she is on low dose levophed with resolution of her afib with rvr.    # Atypical chest pain likely musculoskeletal vs. pleuritic  - patient is currently intubated and sedated, however had atypical chest pain on admission that was worsened after coughing  - troponins here have been negative, EKG showed ST depressions when patient was in afib with rvr to 170s which resolved when patient was rate controlled, likely secondary to demand // patient is currently in NSR at 70  - stress test on 12/01 was negative, chest pain on admission unlikely to be ischemic  - c/w aspirin / plavix / atorvastatin    # Afib with rvr  - patient has a hx of paroxysmal afib, on tele patient had episodes of afib with rvr while levophed was running at a higher dose  - currently patient is in NSR at 70, with levo at 0.02  - attempt to wean down levo, consider switching sedation from propofol to precedex/fentanyl  - repeat thyroid panel as patient's TSH was low normal at 0.48 on 12/05  - attending recommendations to follow     75 yoF pmhx of COPD on 5L home oxygen at home, paroxysmal afib on pradaxa, carotid stenosis (L 80%, R 40%) s/p L carotid stenting recently , HFpEF (EF 70% Dec 2020), HTN, HLD, bipolar disorder ,left parotid mass, TIA on aspirin and plavix,  recently admitted between 12/7-12/11 for COVID PNA s/p steroids/RDV presented to the ED with chest pain and acute hypoxemic respiratory failure.  Patient had to be intubated and started on levophed for septic shock during which she had afib with rvr to the 130s.  Today she is on low dose levophed with resolution of her afib with rvr.    # Atypical chest pain likely musculoskeletal vs. pleuritic  - patient is currently intubated and sedated, however had atypical chest pain on admission that was worsened after coughing  - troponins here have been negative, EKG showed ST depressions when patient was in afib with rvr to 170s which resolved when patient was rate controlled, likely secondary to demand // patient is currently in NSR at 70  - stress test on 12/01 was negative, chest pain on admission unlikely to be ischemic  - c/w aspirin / plavix / atorvastatin    # Afib with rvr  - patient has a hx of paroxysmal afib, on tele patient had episodes of afib with rvr while levophed was running at a higher dose  - currently patient is in NSR at 70, with levo at 0.02  - attempt to wean down levo, consider switching sedation from propofol to precedex/fentanyl  - patient is in NSR please start amiodarone  bid, if goes back to afib w/ rvr then start amiodarone drip for 24 hours

## 2020-12-30 NOTE — PROGRESS NOTE ADULT - SUBJECTIVE AND OBJECTIVE BOX
SHAUN COATES  75y, Female  Allergy: codeine (Other (Moderate))  Depakote (Unknown)  Dilaudid (Short breath; Rash)  IV Contrast (Anaphylaxis)  losartan (Angioedema)  Risperdal (Other)  verapamil (Short breath; Angioedema)      LOS  7d    CHIEF COMPLAINT: chest pain weakness (29 Dec 2020 21:46)      INTERVAL EVENTS/HPI  - intubated, on pressors  - T(F): , Max: 101.4 (12-29-20 @ 20:00)  - WBC Count: 4.62 (12-30-20 @ 04:30)  WBC Count: 4.19 (12-29-20 @ 05:52)  - Creatinine, Serum: 0.8 (12-30-20 @ 04:30)  Creatinine, Serum: 0.7 (12-29-20 @ 05:52)           VITALS:  T(F): 99.2, Max: 101.4 (12-29-20 @ 20:00)  HR: 60  BP: 121/58  RR: 14Vital Signs Last 24 Hrs  T(C): 37.3 (30 Dec 2020 04:55), Max: 38.6 (29 Dec 2020 20:00)  T(F): 99.2 (30 Dec 2020 04:55), Max: 101.4 (29 Dec 2020 20:00)  HR: 60 (30 Dec 2020 06:00) (60 - 128)  BP: 121/58 (30 Dec 2020 06:00) (101/58 - 229/122)  BP(mean): --  RR: 14 (30 Dec 2020 06:00) (14 - 30)  SpO2: 98% (30 Dec 2020 06:00) (93% - 100%)    FH: Non-contributory  Social Hx: Non-contributory    TESTS & MEASUREMENTS:                        8.3    4.62  )-----------( 176      ( 30 Dec 2020 04:30 )             26.9     12-30    141  |  110  |  16  ----------------------------<  117<H>  4.6   |  22  |  0.8    Ca    8.0<L>      30 Dec 2020 04:30  Phos  2.8     12-30  Mg     1.9     12-30    TPro  5.1<L>  /  Alb  2.5<L>  /  TBili  0.5  /  DBili  x   /  AST  24  /  ALT  15  /  AlkPhos  100  12-30    eGFR if Non African American: 73 mL/min/1.73M2 (12-30-20 @ 04:30)  eGFR if African American: 85 mL/min/1.73M2 (12-30-20 @ 04:30)    LIVER FUNCTIONS - ( 30 Dec 2020 04:30 )  Alb: 2.5 g/dL / Pro: 5.1 g/dL / ALK PHOS: 100 U/L / ALT: 15 U/L / AST: 24 U/L / GGT: x               Culture - Blood (collected 12-25-20 @ 11:27)  Source: .Blood None  Preliminary Report (12-26-20 @ 16:01):    No growth to date.    Culture - Blood (collected 12-24-20 @ 22:53)  Source: .Blood Blood-Venous  Preliminary Report (12-26-20 @ 13:01):    No growth to date.    Culture - Urine (collected 12-23-20 @ 00:07)  Source: .Urine Clean Catch (Midstream)  Final Report (12-24-20 @ 02:06):    <10,000 CFU/mL Normal Urogenital Beronica    Culture - Blood (collected 12-22-20 @ 21:53)  Source: .Blood Blood-Peripheral  Gram Stain (12-24-20 @ 03:50):    Growth in aerobic bottle: Gram Positive Cocci in Clusters  Final Report (12-25-20 @ 09:40):    Growth in anaerobic bottle: Staphylococcus capitis    Coag Negative Staphylococcus    Single set isolate, possible contaminant. Contact    Microbiology if susceptibility testing clinically    indicated.    ***Blood Panel PCR results on this specimen are available    approximately 3 hours after the Gram stain result.***    Gram stain, PCR, and/or culture results may not always    correspond due to difference in methodologies.    ************************************************************    This PCR assay was performed using [employer].    The following targets are tested for: Enterococcus,    vancomycin resistant enterococci, Listeria monocytogenes,    coagulase negative staphylococci, S. aureus,    methicillin resistant S. aureus, Streptococcus agalactiae    (Group B), S. pneumoniae, S. pyogenes (Group A),    Acinetobacter baumannii, Enterobacter cloacae, E. coli,    Klebsiella oxytoca, K. pneumoniae, Proteus sp.,    Serratia marcescens, Haemophilus influenzae,    Neisseria meningitidis, Pseudomonas aeruginosa, Candida    albicans, C. glabrata, C krusei, C parapsilosis,    C. tropicalis and the KPC resistance gene.    "Due to technical problems, Proteus sp. and Pseudomonas    aeruginosa will Not be reported as part of the BCID panel    until further notice".  Organism: Blood Culture PCR (12-25-20 @ 09:40)  Organism: Blood Culture PCR (12-25-20 @ 09:40)      -  Coagulase negative Staphylococcus: Detec      Method Type: PCR    Culture - Blood (collected 12-22-20 @ 21:53)  Source: .Blood Blood-Peripheral  Final Report (12-28-20 @ 07:00):    No Growth Final    Culture - Blood (collected 12-07-20 @ 11:28)  Source: .Blood Blood-Peripheral  Final Report (12-12-20 @ 23:01):    No Growth Final            INFECTIOUS DISEASES TESTING  Procalcitonin, Serum: 0.33 (12-28-20 @ 16:00)  MRSA PCR Result.: Negative (12-28-20 @ 14:33)  Vancomycin Level, Trough: 11.4 (12-28-20 @ 10:40)  Procalcitonin, Serum: 1.09 (12-25-20 @ 06:34)  Procalcitonin, Serum: 0.31 (12-24-20 @ 22:52)  COVID-19 PCR: Detected (12-23-20 @ 04:50)  Procalcitonin, Serum: 0.84 (12-22-20 @ 21:56)  Procalcitonin, Serum: 0.10 (12-11-20 @ 08:14)  Vancomycin Level, Trough: 16.2 (12-08-20 @ 16:29)  COVID-19 PCR: Detected (12-07-20 @ 12:30)  Procalcitonin, Serum: 0.09 (12-07-20 @ 10:05)  Procalcitonin, Serum: 0.09 (11-29-20 @ 21:30)  COVID-19 PCR: NotDetec (11-29-20 @ 13:30)  Procalcitonin, Serum: 0.10 (11-29-20 @ 13:15)  Rapid RVP Result: NotDetec (10-14-20 @ 18:06)      INFLAMMATORY MARKERS  C-Reactive Protein, Serum: 9.57 mg/dL (12-23-20 @ 07:33)  C-Reactive Protein, Serum: 2.39 mg/dL (12-11-20 @ 08:14)  C-Reactive Protein, Serum: 6.71 mg/dL (12-09-20 @ 05:37)  C-Reactive Protein, Serum: 8.14 mg/dL (12-09-20 @ 02:00)      RADIOLOGY & ADDITIONAL TESTS:  I have personally reviewed the last available Chest xray  CXR  Xray Chest 1 View- PORTABLE-Urgent:   EXAM:  XR CHEST PORTABLE URGENT 1V            PROCEDURE DATE:  12/28/2020            INTERPRETATION:  Clinical History / Reason for exam: Respiratory failure.    Comparison : Chest radiograph 3 days prior.    Technique/Positioning: Frontal portable.    Findings:    Support devices: Telemetry leads overlie the thorax.    Cardiac/mediastinum/hilum: Unremarkable.    Lung parenchyma/Pleura: Bilateral opacities.    Skeleton/soft tissues: Unremarkable.    Impression:    Bilateral opacifications without change, consistent with viral pneumonia.                  DAMION STRONG MD; Attending Interventional Radiologist  This document has been electronically signed. Dec 28 2020 11:45AM (12-28-20 @ 12:00)      CT      CARDIOLOGY TESTING  12 Lead ECG:   Ventricular Rate 176 BPM    Atrial Rate 182 BPM    QRS Duration 84 ms    Q-T Interval 264 ms    QTC Calculation(Bazett) 451 ms    R Axis 25 degrees    T Axis 212 degrees    Diagnosis Line Atrial fibrillation with rapid ventricular response  Marked ST abnormality, possible inferior subendocardial injury  Abnormal ECG    Confirmed by Scott Cowan ([id number]) on 12/27/2020 12:59:53 PM (12-27-20 @ 05:38)  12 Lead ECG:   Ventricular Rate 131 BPM    Atrial Rate 156 BPM    QRS Duration 90 ms    Q-T Interval 322 ms    QTC Calculation(Bazett) 475 ms    R Axis 29 degrees    T Axis 17 degrees    Diagnosis Line Atrial fibrillation with rapid ventricular response  Nonspecific ST and T wave abnormality  Abnormal ECG    Confirmed by AYNN MATTSON MD [id number]) on 12/28/2020 12:50:38 PM (12-25-20 @ 08:30)      MEDICATIONS  aspirin  chewable 81 Oral daily  atorvastatin 20 Oral at bedtime  budesonide 160 MICROgram(s)/formoterol 4.5 MICROgram(s) Inhaler 2 Inhalation two times a day  cefepime   IVPB 1000 IV Intermittent every 12 hours  chlorhexidine 0.12% Liquid 15 Oral Mucosa every 12 hours  clopidogrel Tablet 75 Oral daily  dabigatran 150 Oral every 12 hours  dexMEDEtomidine Infusion 0.2 IV Continuous <Continuous>  fentaNYL   Infusion. 0.5 IV Continuous <Continuous>  gabapentin 300 Oral three times a day  lamoTRIgine 100 Oral daily  metoprolol tartrate 100 Oral every 12 hours  montelukast 10 Oral daily  multivitamin 1 Oral daily  norepinephrine Infusion 0.1 IV Continuous <Continuous>  pantoprazole    Tablet 40 Oral before breakfast  phenylephrine    Infusion 0.1 IV Continuous <Continuous>  propofol Infusion 10 IV Continuous <Continuous>  QUEtiapine 200 Oral at bedtime  sodium chloride 0.9%. 1000 IV Continuous <Continuous>  sodium chloride 0.9%. 1000 IV Continuous <Continuous>  tiotropium 18 MICROgram(s) Capsule 1 Inhalation daily  vancomycin  IVPB 1000 IV Intermittent every 12 hours      WEIGHT  Weight (kg): 82.599 (12-23-20 @ 04:25)  Creatinine, Serum: 0.8 mg/dL (12-30-20 @ 04:30)      ANTIBIOTICS:  cefepime   IVPB 1000 milliGRAM(s) IV Intermittent every 12 hours  vancomycin  IVPB 1000 milliGRAM(s) IV Intermittent every 12 hours      All available historical records have been reviewed

## 2020-12-30 NOTE — PROGRESS NOTE ADULT - ASSESSMENT
ASSESSMENT  75y F with COPD on 5L home O2, paroxysmal afib, carotid stenosis, HFpEF, HTN, HLD, Bipolar disorder, recent admission D/C 12/11 s/p left TCAR, right groin access  found to be COVID-19 PNA s/p RDV, plasma now admitted with CP     IMPRESSION  #Severe COVID-19 PNA with septic shock requiring pressors     Procalcitonin, Serum: 0.84 ng/mL (12-22-20 @ 21:56)    CXR bilateral opacities , RLL opacity    UA unremarkable UCX NG    COVID-19 PCR: Detected (12-07-20 @ 12:30)   D-Dimer Assay, Quantitative: 2769 ng/mL DDU (12-30-20 @ 04:30)  D-Dimer Assay, Quantitative: 3285 ng/mL DDU (12-29-20 @ 17:07)  Ferritin, Serum: 997 ng/mL (12-28-20 @ 16:00)  #CoNS bacteremia , contaminant     Repeat BCX NG  #JOYCE  #Lactic acidosis  #Obesity BMI (kg/m2): 32.3, 34      RECOMMENDATIONS  - D/C VANC MRSA PCR NEGATIVE   - OK to continue Cefepime 1g q12h IV D8 12/23-  - SEND deep tracheal CX  - Tocilizumab 400mg x1, and recheck D-dimer, CRP, Ferritin in 48h       If any questions, please call or send a message on [employer]  Spectra [phone]

## 2020-12-30 NOTE — PROGRESS NOTE ADULT - SUBJECTIVE AND OBJECTIVE BOX
SHAUN COATES 75y Female  MRN#: [id number]   Hospital Day: 7d    SUBJECTIVE  Patient is a 75y old Female who presents with a chief complaint of chest pain weakness (30 Dec 2020 07:48)  Currently admitted to medicine with the primary diagnosis of Septic shock      INTERVAL HPI AND OVERNIGHT EVENTS:  Patient was examined and seen at bedside.       Patient is intubated, sedated with prop and fentanyl; currently on levophed; afebrile overnight. No acute overnight events.  ABG improving while on MV. Follows commands.     OBJECTIVE  PAST MEDICAL & SURGICAL HISTORY  Falls    Congestive heart failure    Transient ischemic attack    FLAVIO on CPAP    Bipolar 1 disorder    Allergic reaction    PVD (peripheral vascular disease)    Other emphysema    Other cardiomyopathy    TIA (transient ischemic attack)    COPD (chronic obstructive pulmonary disease)    Depression    Hypertension    History of cholecystectomy      ALLERGIES:  codeine (Other (Moderate))  Depakote (Unknown)  Dilaudid (Short breath; Rash)  IV Contrast (Anaphylaxis)  losartan (Angioedema)  Risperdal (Other)  verapamil (Short breath; Angioedema)    MEDICATIONS:  STANDING MEDICATIONS  aspirin  chewable 81 milliGRAM(s) Oral daily  atorvastatin 20 milliGRAM(s) Oral at bedtime  budesonide 160 MICROgram(s)/formoterol 4.5 MICROgram(s) Inhaler 2 Puff(s) Inhalation two times a day  cefepime   IVPB 1000 milliGRAM(s) IV Intermittent every 12 hours  chlorhexidine 0.12% Liquid 15 milliLiter(s) Oral Mucosa every 12 hours  clopidogrel Tablet 75 milliGRAM(s) Oral daily  dabigatran 150 milliGRAM(s) Oral every 12 hours  fentaNYL   Infusion. 0.5 MICROgram(s)/kG/Hr IV Continuous <Continuous>  gabapentin 300 milliGRAM(s) Oral three times a day  lamoTRIgine 100 milliGRAM(s) Oral daily  metoprolol tartrate 100 milliGRAM(s) Oral every 12 hours  montelukast 10 milliGRAM(s) Oral daily  multivitamin 1 Tablet(s) Oral daily  norepinephrine Infusion 0.1 MICROgram(s)/kG/Min IV Continuous <Continuous>  pantoprazole    Tablet 40 milliGRAM(s) Oral before breakfast  polyethylene glycol 3350 17 Gram(s) Oral daily  propofol Infusion 10 MICROgram(s)/kG/Min IV Continuous <Continuous>  QUEtiapine 200 milliGRAM(s) Oral at bedtime  senna 2 Tablet(s) Oral at bedtime  sodium chloride 0.9%. 1000 milliLiter(s) IV Continuous <Continuous>  sodium chloride 0.9%. 1000 milliLiter(s) IV Continuous <Continuous>  tiotropium 18 MICROgram(s) Capsule 1 Capsule(s) Inhalation daily  tocilizumab IVPB 400 milliGRAM(s) IV Intermittent once    PRN MEDICATIONS  acetaminophen  Suppository .. 650 milliGRAM(s) Rectal every 6 hours PRN  ALBUTerol    90 MICROgram(s) HFA Inhaler 1 Puff(s) Inhalation every 4 hours PRN  aluminum hydroxide/magnesium hydroxide/simethicone Suspension 30 milliLiter(s) Oral every 4 hours PRN  guaifenesin/dextromethorphan  Syrup 10 milliLiter(s) Oral every 6 hours PRN  LORazepam     Tablet 0.5 milliGRAM(s) Oral two times a day PRN      VITAL SIGNS: Last 24 Hours  T(C): 36.6 (30 Dec 2020 08:00), Max: 38.6 (29 Dec 2020 20:00)  T(F): 97.8 (30 Dec 2020 08:00), Max: 101.4 (29 Dec 2020 20:00)  HR: 69 (30 Dec 2020 10:00) (60 - 128)  BP: 106/59 (30 Dec 2020 10:10) (78/43 - 229/122)  BP(mean): 76 (30 Dec 2020 10:10) (0 - 97)  RR: 18 (30 Dec 2020 10:00) (14 - 30)  SpO2: 100% (30 Dec 2020 10:00) (93% - 100%)    LABS:                        8.3    4.62  )-----------( 176      ( 30 Dec 2020 04:30 )             26.9     12-30    141  |  110  |  16  ----------------------------<  117<H>  4.6   |  22  |  0.8    Ca    8.0<L>      30 Dec 2020 04:30  Phos  2.8     12-30  Mg     1.9     12-30    TPro  5.1<L>  /  Alb  2.5<L>  /  TBili  0.5  /  DBili  x   /  AST  24  /  ALT  15  /  AlkPhos  100  12-30        ABG - ( 30 Dec 2020 06:32 )  pH, Arterial: 7.37  pH, Blood: x     /  pCO2: 41    /  pO2: 180   / HCO3: 24    / Base Excess: -1.5  /  SaO2: 100                       RADIOLOGY  < from: Xray Chest 1 View-PORTABLE IMMEDIATE (Xray Chest 1 View-PORTABLE IMMEDIATE .) (12.29.20 @ 14:33) >    Impression:    Stable bilateral parenchymal opacities.    < end of copied text >  < from: Xray Chest 1 View- PORTABLE-Urgent (Xray Chest 1 View- PORTABLE-Urgent .) (12.28.20 @ 12:00) >  Impression:    Bilateral opacifications without change, consistent with viral pneumonia.    < end of copied text >        PHYSICAL EXAM:  CONSTITUTIONAL: Intubated and Sedated   HEAD: Atraumatic, normocephalic  EYES: EOM intact, PERRLA, conjunctiva and sclera clear  ENT: Supple, no masses, no thyromegaly, no bruits, no JVD;   PULMONARY: Decreased BS in the anterior lung fields;   CARDIOVASCULAR: Regular rate and rhythm; no murmurs, rubs, or gallops  GASTROINTESTINAL: Soft, non-tender, non-distended; bowel sounds present  MUSCULOSKELETAL: 2+ peripheral pulses; no clubbing, no cyanosis, no edema  NEUROLOGY: Follows commands, able to squeeze my hands on demand; no cranial neuropathies noted.   SKIN: No rashes or lesions; warm and dry    ASSESSMENT & PLAN  Ms Coates is a 74yo woman with medical history of COPD on 5L home oxygen, paroxysmal afib on pradaxa, carotid stenosis (L 80%, R 40%) s/p L carotid stenting recently , HFpEF (EF 70% Dec 2020), HTN, HLD, bipolar disorder, left parotid mass, recently admitted between 12/7-12/11 for COVID PNA s/p steroids/RDV presented to the ED with chest pain.       # Acute Chronic respiratory failure 2/2 likely due to COVID PNA with septic Shock   # Chest pain- likely pleuritic from COVID  requiring pressors initially now off  CXR 12/29/2020 - Increasing diffuse bilateral parenchymal opacities 12/25  CXR 12/30/2020 3:46AM-->Bilateral infiltrates; L>R; ETT and Central Line in adequate position. Pending CXR read for OGT placement   Currently Intubated and Sedated  Propfol and Fentanyl for Analgesia and Sedation  Currently on Levophed; titrate to a MAP>60   CBC demonstrates Hgb of 8.3 (8.6 yesterday) w/lymphopenia   CMP demonstrates no JOYCE, ALI or electrolyte abnormalities   CRP--->2.39-->9.57  PCT-->1.09-->.33  Ferritin-->910-->997  Repeating inflammatory markers this AM; pending results  ABG before intubation-->7.46/32/34/ 70 Arterial Sat   ABG s/p 1 hour after intubation-->7.33/46/133/ Arterial O2 99  ABG this AM-->7.37/41/180/Arterial Sat 100%-->Will down-titrate to FiO2 to 30%   Patient is currently on A/C Volume MV; TV: 400, RR: 16, FiO2 50% PEEP 8    1g q12 Vancomycin d/c'd as MRSA PCR -ve; cefepime 1g q12 started; BCx were collected prior to Abx starting  ECHO (12/2/2020): Hyperdynamic global left ventricular systolic function; EF of 70 %; Moderately increased LV wall thickness. Grade I diastolic dysfunction.  DTA  Giving 1 x dose of Toci today         #Paroxysmal atrial fibrillation w/ RVR   c/w Pradaxa 150mg PO BID   ASA 81 mg  and Plavix recently added after a recent TIA last month.   c/w Metoprolol for rate control    #JOYCE likely pre renal - resolved  baseline: 0.8-1.1  SCr .8 today  K+ 4.6    # Left ICA s/p TCAR (transcarotid artery revascularization) 12/04 with TIA last admission  c/w plavix, pradaxa, ASA  Code stroke on 12/05 for aphasia - workup was negative for acute stroke    #Left intra parotid mass   needs outpt biopsy     # Chronic HFpEF  # HTN  #HLD  /65  Preserved EF according to Dr. Bishop's notes, although no echo report in [place].   ECHO (12/2/2020): Hyperdynamic global left ventricular systolic function; EF of 70 %; Moderately increased LV wall thickness. Grade I diastolic dysfunction.  c/w Atorvastatin  c/w Metoprolol -->Hold if SBP<100      #Bipolar Disorder  Mood stable  Continue home meds Lamictal 100mg PO daily, Seroquel 200mg at bedtime, and Ativan 0.5mg PO twice daily PRN      #Misc  - DVT Prophylaxis: Pradaxa   - Diet: NPO w/tube feeds   - GI Prophylaxis: PPI   - Activity: AAT   - Code Status: Full Code  Dispo: Acute        SHAUN COATES 75y Female  MRN#: [id number]   Hospital Day: 7d    SUBJECTIVE  Patient is a 75y old Female who presents with a chief complaint of chest pain weakness (30 Dec 2020 07:48)  Currently admitted to medicine with the primary diagnosis of Septic shock    Attending note: Pt seen and examined at bedside. Pt vented. Intubated       INTERVAL HPI AND OVERNIGHT EVENTS:  Patient was examined and seen at bedside.       Patient is intubated, sedated with prop and fentanyl; currently on levophed; afebrile overnight. No acute overnight events.  ABG improving while on MV. Follows commands.     OBJECTIVE  PAST MEDICAL & SURGICAL HISTORY  Falls    Congestive heart failure    Transient ischemic attack    FLAVIO on CPAP    Bipolar 1 disorder    Allergic reaction    PVD (peripheral vascular disease)    Other emphysema    Other cardiomyopathy    TIA (transient ischemic attack)    COPD (chronic obstructive pulmonary disease)    Depression    Hypertension    History of cholecystectomy      ALLERGIES:  codeine (Other (Moderate))  Depakote (Unknown)  Dilaudid (Short breath; Rash)  IV Contrast (Anaphylaxis)  losartan (Angioedema)  Risperdal (Other)  verapamil (Short breath; Angioedema)    MEDICATIONS:  STANDING MEDICATIONS  aspirin  chewable 81 milliGRAM(s) Oral daily  atorvastatin 20 milliGRAM(s) Oral at bedtime  budesonide 160 MICROgram(s)/formoterol 4.5 MICROgram(s) Inhaler 2 Puff(s) Inhalation two times a day  cefepime   IVPB 1000 milliGRAM(s) IV Intermittent every 12 hours  chlorhexidine 0.12% Liquid 15 milliLiter(s) Oral Mucosa every 12 hours  clopidogrel Tablet 75 milliGRAM(s) Oral daily  dabigatran 150 milliGRAM(s) Oral every 12 hours  fentaNYL   Infusion. 0.5 MICROgram(s)/kG/Hr IV Continuous <Continuous>  gabapentin 300 milliGRAM(s) Oral three times a day  lamoTRIgine 100 milliGRAM(s) Oral daily  metoprolol tartrate 100 milliGRAM(s) Oral every 12 hours  montelukast 10 milliGRAM(s) Oral daily  multivitamin 1 Tablet(s) Oral daily  norepinephrine Infusion 0.1 MICROgram(s)/kG/Min IV Continuous <Continuous>  pantoprazole    Tablet 40 milliGRAM(s) Oral before breakfast  polyethylene glycol 3350 17 Gram(s) Oral daily  propofol Infusion 10 MICROgram(s)/kG/Min IV Continuous <Continuous>  QUEtiapine 200 milliGRAM(s) Oral at bedtime  senna 2 Tablet(s) Oral at bedtime  sodium chloride 0.9%. 1000 milliLiter(s) IV Continuous <Continuous>  sodium chloride 0.9%. 1000 milliLiter(s) IV Continuous <Continuous>  tiotropium 18 MICROgram(s) Capsule 1 Capsule(s) Inhalation daily  tocilizumab IVPB 400 milliGRAM(s) IV Intermittent once    PRN MEDICATIONS  acetaminophen  Suppository .. 650 milliGRAM(s) Rectal every 6 hours PRN  ALBUTerol    90 MICROgram(s) HFA Inhaler 1 Puff(s) Inhalation every 4 hours PRN  aluminum hydroxide/magnesium hydroxide/simethicone Suspension 30 milliLiter(s) Oral every 4 hours PRN  guaifenesin/dextromethorphan  Syrup 10 milliLiter(s) Oral every 6 hours PRN  LORazepam     Tablet 0.5 milliGRAM(s) Oral two times a day PRN      VITAL SIGNS: Last 24 Hours  T(C): 36.6 (30 Dec 2020 08:00), Max: 38.6 (29 Dec 2020 20:00)  T(F): 97.8 (30 Dec 2020 08:00), Max: 101.4 (29 Dec 2020 20:00)  HR: 69 (30 Dec 2020 10:00) (60 - 128)  BP: 106/59 (30 Dec 2020 10:10) (78/43 - 229/122)  BP(mean): 76 (30 Dec 2020 10:10) (0 - 97)  RR: 18 (30 Dec 2020 10:00) (14 - 30)  SpO2: 100% (30 Dec 2020 10:00) (93% - 100%)    LABS:                        8.3    4.62  )-----------( 176      ( 30 Dec 2020 04:30 )             26.9     12-30    141  |  110  |  16  ----------------------------<  117<H>  4.6   |  22  |  0.8    Ca    8.0<L>      30 Dec 2020 04:30  Phos  2.8     12-30  Mg     1.9     12-30    TPro  5.1<L>  /  Alb  2.5<L>  /  TBili  0.5  /  DBili  x   /  AST  24  /  ALT  15  /  AlkPhos  100  12-30        ABG - ( 30 Dec 2020 06:32 )  pH, Arterial: 7.37  pH, Blood: x     /  pCO2: 41    /  pO2: 180   / HCO3: 24    / Base Excess: -1.5  /  SaO2: 100             RADIOLOGY  < from: Xray Chest 1 View-PORTABLE IMMEDIATE (Xray Chest 1 View-PORTABLE IMMEDIATE .) (12.29.20 @ 14:33) >    Impression:    Stable bilateral parenchymal opacities.    < end of copied text >  < from: Xray Chest 1 View- PORTABLE-Urgent (Xray Chest 1 View- PORTABLE-Urgent .) (12.28.20 @ 12:00) >  Impression:    Bilateral opacifications without change, consistent with viral pneumonia.    < end of copied text >        PHYSICAL EXAM:  CONSTITUTIONAL: Intubated and Sedated   HEAD: Atraumatic, normocephalic  EYES: EOM intact, PERRLA, conjunctiva and sclera clear  ENT: Supple, no masses, no thyromegaly, no bruits, no JVD;   PULMONARY: Decreased BS in the anterior lung fields;   CARDIOVASCULAR: Regular rate and rhythm; no murmurs, rubs, or gallops  GASTROINTESTINAL: Soft, non-tender, non-distended; bowel sounds present  MUSCULOSKELETAL: 2+ peripheral pulses; no clubbing, no cyanosis, no edema  NEUROLOGY: Follows commands, able to squeeze my hands on demand; no cranial neuropathies noted.   SKIN: No rashes or lesions; warm and dry    ASSESSMENT & PLAN  Ms Coates is a 76yo woman with medical history of COPD on 5L home oxygen, paroxysmal afib on pradaxa, carotid stenosis (L 80%, R 40%) s/p L carotid stenting recently , HFpEF (EF 70% Dec 2020), HTN, HLD, bipolar disorder, left parotid mass, recently admitted between 12/7-12/11 for COVID PNA s/p steroids/RDV presented to the ED with chest pain.       # Acute Chronic respiratory failure 2/2 likely due to COVID PNA with septic Shock   # Chest pain- likely pleuritic from COVID  requiring pressors initially now off  CXR 12/29/2020 - Increasing diffuse bilateral parenchymal opacities 12/25  CXR 12/30/2020 3:46AM-->Bilateral infiltrates; L>R; ETT and Central Line in adequate position. Pending CXR read for OGT placement   Currently Intubated and Sedated  Propfol and Fentanyl for Analgesia and Sedation  Currently on Levophed; titrate to a MAP>60   CBC demonstrates Hgb of 8.3 (8.6 yesterday) w/lymphopenia   CMP demonstrates no JOYCE, ALI or electrolyte abnormalities   CRP--->2.39-->9.57  PCT-->1.09-->.33  Ferritin-->910-->997  Repeating inflammatory markers this AM; pending results  ABG before intubation-->7.46/32/34/ 70 Arterial Sat   ABG s/p 1 hour after intubation-->7.33/46/133/ Arterial O2 99  ABG this AM-->7.37/41/180/Arterial Sat 100%-->Will down-titrate to FiO2 to 30%   Patient is currently on A/C Volume MV; TV: 400, RR: 16, FiO2 50% PEEP 8    1g q12 Vancomycin d/c'd as MRSA PCR -ve; cefepime 1g q12 started; BCx were collected prior to Abx starting  ECHO (12/2/2020): Hyperdynamic global left ventricular systolic function; EF of 70 %; Moderately increased LV wall thickness. Grade I diastolic dysfunction.  DTA  Giving 1 x dose of Toci today   Attending note: vent adjusted, pt on fentanyl, started on propofol, bp dropped, switched to precedex and pt awoke and switched back to propofol, levo as needed for bp     #Paroxysmal atrial fibrillation w/ RVR   c/w Pradaxa 150mg PO BID   ASA 81 mg  and Plavix recently added after a recent TIA last month.   c/w Metoprolol for rate control    #JOYCE likely pre renal - resolved  baseline: 0.8-1.1  SCr .8 today  K+ 4.6    # Left ICA s/p TCAR (transcarotid artery revascularization) 12/04 with TIA last admission  c/w plavix, pradaxa, ASA  Code stroke on 12/05 for aphasia - workup was negative for acute stroke    #Left intra parotid mass   needs outpt biopsy     # Chronic HFpEF  # HTN  #HLD  /65  Preserved EF according to Dr. Bishop's notes, although no echo report in [place].   ECHO (12/2/2020): Hyperdynamic global left ventricular systolic function; EF of 70 %; Moderately increased LV wall thickness. Grade I diastolic dysfunction.  c/w Atorvastatin  c/w Metoprolol -->Hold if SBP<100      #Bipolar Disorder  Mood stable  Continue home meds Lamictal 100mg PO daily, Seroquel 200mg at bedtime, and Ativan 0.5mg PO twice daily PRN      #Misc  - DVT Prophylaxis: Pradaxa   - Diet: NPO w/tube feeds   - GI Prophylaxis: PPI   - Activity: AAT   - Code Status: Full Code  Dispo: Acute

## 2020-12-30 NOTE — PROGRESS NOTE ADULT - ASSESSMENT
IMPRESSION:    Sepsis present on admission/ staph capitis  UTI/ prior E Coli in urine  severe Covid pneumonia worsening spiking T SP Intubation  P A fib rapid  COPD on home oxygen  ? stroke         PLAN:    CNS: precedex, fentanyl    HEENT:  Oral care    PULMONARY:  HOB @ 45 degrees, Increase RR, PEEP, repeat abc    CARDIOVASCULAR: keep equal balance, cardio f/up,     GI: GI prophylaxis                                          Feeding Ngt  RENAL:  F/u  lytes.  Correct as needed. accurate I/O    INFECTIOUS DISEASE: f/up cx, cefepime/, f/up infl markers, ID f/up, repeat cx    HEMATOLOGICAL:  DVT prophylaxis.    ENDOCRINE:  Follow up FS.  Insulin protocol if needed.    MUSCULOSKELETAL: bedrest    palliative care eval  DISPOSITION: ED3  poor prognosis  advance directives

## 2020-12-30 NOTE — PROGRESS NOTE ADULT - SUBJECTIVE AND OBJECTIVE BOX
OVERNIGHT EVENTS: sp intubation on pecedex, fentanyl , levophed spIking T    Vital Signs Last 24 Hrs  T(C): 36.4 (30 Dec 2020 17:00), Max: 38.6 (29 Dec 2020 20:00)  T(F): 97.5 (30 Dec 2020 17:00), Max: 101.4 (29 Dec 2020 20:00)  HR: 65 (30 Dec 2020 19:00) (60 - 82)  BP: 99/50 (30 Dec 2020 19:00) (76/46 - 179/77)  BP(mean): 70 (30 Dec 2020 19:00) (0 - 110)  RR: 21 (30 Dec 2020 19:00) (14 - 23)  SpO2: 96% (30 Dec 2020 19:00) (94% - 100%)    PHYSICAL EXAMINATION:    GENERAL: Sedated    HEENT: Head is normocephalic and atraumatic.     NECK: Supple.    LUNGS: bl crackles    HEART: irregular, MARCELINO 3.6    ABDOMEN: Soft, nontender, and nondistended.      EXTREMITIES: SWELLING+  NEUROLOGIC: Grossly intact.    SKIN: No ulceration or induration present.      LABS:                        8.3    4.62  )-----------( 176      ( 30 Dec 2020 04:30 )             26.9     12-30    141  |  110  |  16  ----------------------------<  117<H>  4.6   |  22  |  0.8    Ca    8.0<L>      30 Dec 2020 04:30  Phos  2.8     12-30  Mg     1.9     12-30    TPro  5.1<L>  /  Alb  2.5<L>  /  TBili  0.5  /  DBili  x   /  AST  24  /  ALT  15  /  AlkPhos  100  12-30        ABG - ( 30 Dec 2020 13:05 )  pH, Arterial: 7.32  pH, Blood: x     /  pCO2: 48    /  pO2: 61    / HCO3: 25    / Base Excess: -1.9  /  SaO2: 91                    D-Dimer Assay, Quantitative: 2769 ng/mL DDU (12-30-20 @ 04:30)        Procalcitonin, Serum: 6.12 ng/mL (12-30-20 @ 04:30)  Procalcitonin, Serum: 0.31 ng/mL (12-29-20 @ 21:31)  Procalcitonin, Serum: 0.33 ng/mL (12-28-20 @ 16:00)        12-29-20 @ 07:01  -  12-30-20 @ 07:00  --------------------------------------------------------  IN: 684.7 mL / OUT: 1045 mL / NET: -360.3 mL    12-30-20 @ 07:01  -  12-30-20 @ 19:03  --------------------------------------------------------  IN: 1899.4 mL / OUT: 412 mL / NET: 1487.4 mL        MICROBIOLOGY:      MEDICATIONS  (STANDING):  ascorbic acid 500 milliGRAM(s) Oral daily  aspirin  chewable 81 milliGRAM(s) Oral daily  atorvastatin 20 milliGRAM(s) Oral at bedtime  budesonide 160 MICROgram(s)/formoterol 4.5 MICROgram(s) Inhaler 2 Puff(s) Inhalation two times a day  chlorhexidine 0.12% Liquid 15 milliLiter(s) Oral Mucosa every 12 hours  clopidogrel Tablet 75 milliGRAM(s) Oral daily  dabigatran 150 milliGRAM(s) Oral every 12 hours  fentaNYL   Infusion. 0.5 MICROgram(s)/kG/Hr (4.13 mL/Hr) IV Continuous <Continuous>  gabapentin 300 milliGRAM(s) Oral three times a day  lamoTRIgine 100 milliGRAM(s) Oral daily  metoprolol tartrate 100 milliGRAM(s) Oral every 12 hours  montelukast 10 milliGRAM(s) Oral daily  multivitamin 1 Tablet(s) Oral daily  norepinephrine Infusion 0.1 MICROgram(s)/kG/Min (15.5 mL/Hr) IV Continuous <Continuous>  pantoprazole    Tablet 40 milliGRAM(s) Oral before breakfast  polyethylene glycol 3350 17 Gram(s) Oral daily  propofol Infusion 10 MICROgram(s)/kG/Min (4.96 mL/Hr) IV Continuous <Continuous>  QUEtiapine 200 milliGRAM(s) Oral at bedtime  senna 2 Tablet(s) Oral at bedtime  sodium chloride 0.9%. 1000 milliLiter(s) (75 mL/Hr) IV Continuous <Continuous>  tiotropium 18 MICROgram(s) Capsule 1 Capsule(s) Inhalation daily    MEDICATIONS  (PRN):  acetaminophen  Suppository .. 650 milliGRAM(s) Rectal every 6 hours PRN Temp greater or equal to 38C (100.4F)  ALBUTerol    90 MICROgram(s) HFA Inhaler 1 Puff(s) Inhalation every 4 hours PRN Shortness of Breath  aluminum hydroxide/magnesium hydroxide/simethicone Suspension 30 milliLiter(s) Oral every 4 hours PRN Dyspepsia  guaifenesin/dextromethorphan  Syrup 10 milliLiter(s) Oral every 6 hours PRN Cough  LORazepam     Tablet 0.5 milliGRAM(s) Oral two times a day PRN Anxiety      RADIOLOGY & ADDITIONAL STUDIES:

## 2020-12-31 LAB
24R-OH-CALCIDIOL SERPL-MCNC: 20 NG/ML — LOW (ref 30–80)
ALBUMIN SERPL ELPH-MCNC: 2.5 G/DL — LOW (ref 3.5–5.2)
ALP SERPL-CCNC: 99 U/L — SIGNIFICANT CHANGE UP (ref 30–115)
ALT FLD-CCNC: 21 U/L — SIGNIFICANT CHANGE UP (ref 0–41)
ANION GAP SERPL CALC-SCNC: 8 MMOL/L — SIGNIFICANT CHANGE UP (ref 7–14)
AST SERPL-CCNC: 38 U/L — SIGNIFICANT CHANGE UP (ref 0–41)
BASE EXCESS BLDA CALC-SCNC: -1.5 MMOL/L — SIGNIFICANT CHANGE UP (ref -2–2)
BASOPHILS # BLD AUTO: 0.01 K/UL — SIGNIFICANT CHANGE UP (ref 0–0.2)
BASOPHILS NFR BLD AUTO: 0.4 % — SIGNIFICANT CHANGE UP (ref 0–1)
BILIRUB SERPL-MCNC: 1 MG/DL — SIGNIFICANT CHANGE UP (ref 0.2–1.2)
BUN SERPL-MCNC: 19 MG/DL — SIGNIFICANT CHANGE UP (ref 10–20)
CALCIUM SERPL-MCNC: 8.1 MG/DL — LOW (ref 8.5–10.1)
CHLORIDE SERPL-SCNC: 109 MMOL/L — SIGNIFICANT CHANGE UP (ref 98–110)
CO2 BLDA-SCNC: 55 MMOL/L — SIGNIFICANT CHANGE UP
CO2 SERPL-SCNC: 23 MMOL/L — SIGNIFICANT CHANGE UP (ref 17–32)
CREAT SERPL-MCNC: 0.7 MG/DL — SIGNIFICANT CHANGE UP (ref 0.7–1.5)
EOSINOPHIL # BLD AUTO: 0.21 K/UL — SIGNIFICANT CHANGE UP (ref 0–0.7)
EOSINOPHIL NFR BLD AUTO: 8.9 % — HIGH (ref 0–8)
GAS PNL BLDA: SIGNIFICANT CHANGE UP
GAS PNL BLDA: SIGNIFICANT CHANGE UP
GLUCOSE BLDC GLUCOMTR-MCNC: 121 MG/DL — HIGH (ref 70–99)
GLUCOSE BLDC GLUCOMTR-MCNC: 130 MG/DL — HIGH (ref 70–99)
GLUCOSE BLDC GLUCOMTR-MCNC: 145 MG/DL — HIGH (ref 70–99)
GLUCOSE BLDC GLUCOMTR-MCNC: 160 MG/DL — HIGH (ref 70–99)
GLUCOSE SERPL-MCNC: 105 MG/DL — HIGH (ref 70–99)
GRAM STN FLD: SIGNIFICANT CHANGE UP
HCO3 BLDA-SCNC: 26 MMOL/L — SIGNIFICANT CHANGE UP (ref 23–27)
HCT VFR BLD CALC: 28.8 % — LOW (ref 37–47)
HGB BLD-MCNC: 8.8 G/DL — LOW (ref 12–16)
HOROWITZ INDEX BLDA+IHG-RTO: 45 — SIGNIFICANT CHANGE UP
IMM GRANULOCYTES NFR BLD AUTO: 0.4 % — HIGH (ref 0.1–0.3)
LYMPHOCYTES # BLD AUTO: 0.75 K/UL — LOW (ref 1.2–3.4)
LYMPHOCYTES # BLD AUTO: 31.8 % — SIGNIFICANT CHANGE UP (ref 20.5–51.1)
MAGNESIUM SERPL-MCNC: 1.8 MG/DL — SIGNIFICANT CHANGE UP (ref 1.8–2.4)
MCHC RBC-ENTMCNC: 30.4 PG — SIGNIFICANT CHANGE UP (ref 27–31)
MCHC RBC-ENTMCNC: 30.6 G/DL — LOW (ref 32–37)
MCV RBC AUTO: 99.7 FL — HIGH (ref 81–99)
MONOCYTES # BLD AUTO: 0.11 K/UL — SIGNIFICANT CHANGE UP (ref 0.1–0.6)
MONOCYTES NFR BLD AUTO: 4.7 % — SIGNIFICANT CHANGE UP (ref 1.7–9.3)
NEUTROPHILS # BLD AUTO: 1.27 K/UL — LOW (ref 1.4–6.5)
NEUTROPHILS NFR BLD AUTO: 53.8 % — SIGNIFICANT CHANGE UP (ref 42.2–75.2)
NRBC # BLD: 0 /100 WBCS — SIGNIFICANT CHANGE UP (ref 0–0)
PCO2 BLDA: 55 MMHG — HIGH (ref 38–42)
PH BLDA: 7.28 — LOW (ref 7.38–7.42)
PHOSPHATE SERPL-MCNC: 2.2 MG/DL — SIGNIFICANT CHANGE UP (ref 2.1–4.9)
PLATELET # BLD AUTO: 173 K/UL — SIGNIFICANT CHANGE UP (ref 130–400)
PO2 BLDA: 76 MMHG — LOW (ref 78–95)
POTASSIUM SERPL-MCNC: 4.6 MMOL/L — SIGNIFICANT CHANGE UP (ref 3.5–5)
POTASSIUM SERPL-SCNC: 4.6 MMOL/L — SIGNIFICANT CHANGE UP (ref 3.5–5)
PROT SERPL-MCNC: 5.3 G/DL — LOW (ref 6–8)
RBC # BLD: 2.89 M/UL — LOW (ref 4.2–5.4)
RBC # FLD: 13.9 % — SIGNIFICANT CHANGE UP (ref 11.5–14.5)
SAO2 % BLDA: 95 % — SIGNIFICANT CHANGE UP (ref 94–98)
SODIUM SERPL-SCNC: 140 MMOL/L — SIGNIFICANT CHANGE UP (ref 135–146)
SPECIMEN SOURCE: SIGNIFICANT CHANGE UP
WBC # BLD: 2.36 K/UL — LOW (ref 4.8–10.8)
WBC # FLD AUTO: 2.36 K/UL — LOW (ref 4.8–10.8)

## 2020-12-31 PROCEDURE — 71045 X-RAY EXAM CHEST 1 VIEW: CPT | Mod: 26

## 2020-12-31 PROCEDURE — 71045 X-RAY EXAM CHEST 1 VIEW: CPT | Mod: 26,77

## 2020-12-31 PROCEDURE — 99233 SBSQ HOSP IP/OBS HIGH 50: CPT | Mod: CS

## 2020-12-31 PROCEDURE — 99232 SBSQ HOSP IP/OBS MODERATE 35: CPT | Mod: CS

## 2020-12-31 RX ORDER — MEROPENEM 1 G/30ML
1000 INJECTION INTRAVENOUS EVERY 8 HOURS
Refills: 0 | Status: DISCONTINUED | OUTPATIENT
Start: 2020-12-31 | End: 2020-12-31

## 2020-12-31 RX ORDER — CEFEPIME 1 G/1
1000 INJECTION, POWDER, FOR SOLUTION INTRAMUSCULAR; INTRAVENOUS EVERY 12 HOURS
Refills: 0 | Status: DISCONTINUED | OUTPATIENT
Start: 2020-12-31 | End: 2020-12-31

## 2020-12-31 RX ORDER — MIDAZOLAM HYDROCHLORIDE 1 MG/ML
2 INJECTION, SOLUTION INTRAMUSCULAR; INTRAVENOUS ONCE
Refills: 0 | Status: DISCONTINUED | OUTPATIENT
Start: 2020-12-31 | End: 2020-12-31

## 2020-12-31 RX ORDER — CEFEPIME 1 G/1
1000 INJECTION, POWDER, FOR SOLUTION INTRAMUSCULAR; INTRAVENOUS EVERY 12 HOURS
Refills: 0 | Status: DISCONTINUED | OUTPATIENT
Start: 2020-12-31 | End: 2021-01-03

## 2020-12-31 RX ORDER — ENOXAPARIN SODIUM 100 MG/ML
80 INJECTION SUBCUTANEOUS
Refills: 0 | Status: DISCONTINUED | OUTPATIENT
Start: 2020-12-31 | End: 2021-01-08

## 2020-12-31 RX ADMIN — Medication 650 MILLIGRAM(S): at 04:14

## 2020-12-31 RX ADMIN — CEFEPIME 100 MILLIGRAM(S): 1 INJECTION, POWDER, FOR SOLUTION INTRAMUSCULAR; INTRAVENOUS at 10:57

## 2020-12-31 RX ADMIN — LAMOTRIGINE 100 MILLIGRAM(S): 25 TABLET, ORALLY DISINTEGRATING ORAL at 11:24

## 2020-12-31 RX ADMIN — POLYETHYLENE GLYCOL 3350 17 GRAM(S): 17 POWDER, FOR SOLUTION ORAL at 11:23

## 2020-12-31 RX ADMIN — GABAPENTIN 300 MILLIGRAM(S): 400 CAPSULE ORAL at 14:05

## 2020-12-31 RX ADMIN — CHLORHEXIDINE GLUCONATE 15 MILLILITER(S): 213 SOLUTION TOPICAL at 18:38

## 2020-12-31 RX ADMIN — Medication 81 MILLIGRAM(S): at 11:25

## 2020-12-31 RX ADMIN — CHLORHEXIDINE GLUCONATE 15 MILLILITER(S): 213 SOLUTION TOPICAL at 06:12

## 2020-12-31 RX ADMIN — ENOXAPARIN SODIUM 80 MILLIGRAM(S): 100 INJECTION SUBCUTANEOUS at 18:35

## 2020-12-31 RX ADMIN — ATORVASTATIN CALCIUM 20 MILLIGRAM(S): 80 TABLET, FILM COATED ORAL at 21:32

## 2020-12-31 RX ADMIN — GABAPENTIN 300 MILLIGRAM(S): 400 CAPSULE ORAL at 21:33

## 2020-12-31 RX ADMIN — CLOPIDOGREL BISULFATE 75 MILLIGRAM(S): 75 TABLET, FILM COATED ORAL at 11:22

## 2020-12-31 RX ADMIN — Medication 1 TABLET(S): at 11:22

## 2020-12-31 RX ADMIN — SENNA PLUS 2 TABLET(S): 8.6 TABLET ORAL at 21:32

## 2020-12-31 RX ADMIN — Medication 650 MILLIGRAM(S): at 06:12

## 2020-12-31 RX ADMIN — CEFEPIME 100 MILLIGRAM(S): 1 INJECTION, POWDER, FOR SOLUTION INTRAMUSCULAR; INTRAVENOUS at 18:37

## 2020-12-31 RX ADMIN — MONTELUKAST 10 MILLIGRAM(S): 4 TABLET, CHEWABLE ORAL at 11:22

## 2020-12-31 RX ADMIN — QUETIAPINE FUMARATE 200 MILLIGRAM(S): 200 TABLET, FILM COATED ORAL at 21:32

## 2020-12-31 RX ADMIN — Medication 500 MILLIGRAM(S): at 11:25

## 2020-12-31 RX ADMIN — PANTOPRAZOLE SODIUM 40 MILLIGRAM(S): 20 TABLET, DELAYED RELEASE ORAL at 06:11

## 2020-12-31 RX ADMIN — GABAPENTIN 300 MILLIGRAM(S): 400 CAPSULE ORAL at 06:11

## 2020-12-31 NOTE — PROGRESS NOTE ADULT - ATTENDING COMMENTS
#Progress Note Handoff  Pending (specify):  pending improvement, possible SBT   Family discussion:  Disposition: Home___/SNF___/Other___/Unknown at this time___  Pt seen during COVID 19 Pandemic. #Progress Note Handoff  Pending (specify):  pending improvement, possible SBT 24-48 hrs once stable , follow up ABG  Family discussion: dw daughter amara today and updated on plan. Discussed what happened with vent today and she was understanding,  Disposition: Home___/SNF___/Other___/Unknown at this time___  Pt seen during COVID 19 Pandemic.

## 2020-12-31 NOTE — PROGRESS NOTE ADULT - SUBJECTIVE AND OBJECTIVE BOX
OVERNIGHT EVENTS: events noted, still intubated, ventilated, on propofol, fentanyl, levophed, spiked T    Vital Signs Last 24 Hrs  T(C): 38.4 (31 Dec 2020 10:31), Max: 38.6 (31 Dec 2020 04:00)  T(F): 101.2 (31 Dec 2020 10:31), Max: 101.5 (31 Dec 2020 04:00)  HR: 73 (31 Dec 2020 14:15) (63 - 118)  BP: 93/46 (31 Dec 2020 14:15) (73/40 - 126/57)  BP(mean): 64 (31 Dec 2020 14:15) (0 - 83)  RR: 22 (31 Dec 2020 14:00) (20 - 23)  SpO2: 98% (31 Dec 2020 14:00) (92% - 100%)    PHYSICAL EXAMINATION:    GENERAL: ill looking    HEENT: Head is normocephalic and atraumatic.    NECK: Supple.    LUNGS: bl crackles    HEART: Regular rate and rhythm without murmur.    ABDOMEN: Soft, nontender, and nondistended.      EXTREMITIES: Without any cyanosis, clubbing, rash, lesions or edema.    NEUROLOGIC: Grossly intact.    SKIN: No ulceration or induration present.      LABS:                        8.8    2.36  )-----------( 173      ( 31 Dec 2020 04:30 )             28.8     12-31    140  |  109  |  19  ----------------------------<  105<H>  4.6   |  23  |  0.7    Ca    8.1<L>      31 Dec 2020 04:30  Phos  2.2     12-31  Mg     1.8     12-31    TPro  5.3<L>  /  Alb  2.5<L>  /  TBili  1.0  /  DBili  x   /  AST  38  /  ALT  21  /  AlkPhos  99  12-31        ABG - ( 31 Dec 2020 04:49 )  pH, Arterial: 7.36  pH, Blood: x     /  pCO2: 44    /  pO2: 90    / HCO3: 25    / Base Excess: -0.8  /  SaO2: 98          45%                Procalcitonin, Serum: 6.12 ng/mL (12-30-20 @ 04:30)  Procalcitonin, Serum: 0.31 ng/mL (12-29-20 @ 21:31)  Procalcitonin, Serum: 0.33 ng/mL (12-28-20 @ 16:00)        12-30-20 @ 07:01  -  12-31-20 @ 07:00  --------------------------------------------------------  IN: 3806.4 mL / OUT: 1042 mL / NET: 2764.4 mL    12-31-20 @ 07:01  -  12-31-20 @ 15:23  --------------------------------------------------------  IN: 0 mL / OUT: 160 mL / NET: -160 mL        MICROBIOLOGY:  Culture Results:   No growth to date. (12-29 @ 16:45)  Culture Results:   No growth (12-29 @ 15:21)      MEDICATIONS  (STANDING):  ascorbic acid 500 milliGRAM(s) Oral daily  aspirin  chewable 81 milliGRAM(s) Oral daily  atorvastatin 20 milliGRAM(s) Oral at bedtime  budesonide 160 MICROgram(s)/formoterol 4.5 MICROgram(s) Inhaler 2 Puff(s) Inhalation two times a day  chlorhexidine 0.12% Liquid 15 milliLiter(s) Oral Mucosa every 12 hours  clopidogrel Tablet 75 milliGRAM(s) Oral daily  dabigatran 150 milliGRAM(s) Oral every 12 hours  fentaNYL   Infusion. 0.5 MICROgram(s)/kG/Hr (4.13 mL/Hr) IV Continuous <Continuous>  gabapentin 300 milliGRAM(s) Oral three times a day  lamoTRIgine 100 milliGRAM(s) Oral daily  meropenem  IVPB 1000 milliGRAM(s) IV Intermittent every 8 hours  metoprolol tartrate 100 milliGRAM(s) Oral every 12 hours  montelukast 10 milliGRAM(s) Oral daily  multivitamin 1 Tablet(s) Oral daily  norepinephrine Infusion 0.1 MICROgram(s)/kG/Min (15.5 mL/Hr) IV Continuous <Continuous>  pantoprazole    Tablet 40 milliGRAM(s) Oral before breakfast  polyethylene glycol 3350 17 Gram(s) Oral daily  propofol Infusion 10 MICROgram(s)/kG/Min (4.96 mL/Hr) IV Continuous <Continuous>  QUEtiapine 200 milliGRAM(s) Oral at bedtime  senna 2 Tablet(s) Oral at bedtime  sodium chloride 0.9%. 1000 milliLiter(s) (75 mL/Hr) IV Continuous <Continuous>  tiotropium 18 MICROgram(s) Capsule 1 Capsule(s) Inhalation daily    MEDICATIONS  (PRN):  acetaminophen  Suppository .. 650 milliGRAM(s) Rectal every 6 hours PRN Temp greater or equal to 38C (100.4F)  ALBUTerol    90 MICROgram(s) HFA Inhaler 1 Puff(s) Inhalation every 4 hours PRN Shortness of Breath  aluminum hydroxide/magnesium hydroxide/simethicone Suspension 30 milliLiter(s) Oral every 4 hours PRN Dyspepsia  guaifenesin/dextromethorphan  Syrup 10 milliLiter(s) Oral every 6 hours PRN Cough      RADIOLOGY & ADDITIONAL STUDIES:

## 2020-12-31 NOTE — PROGRESS NOTE ADULT - SUBJECTIVE AND OBJECTIVE BOX
SHAUN COATES 75y Female  MRN#: [id number]   Hospital Day: 8d    SUBJECTIVE  Patient is a 75y old Female who presents with a chief complaint of chest pain weakness (31 Dec 2020 09:19)  Currently admitted to medicine with the primary diagnosis of Septic shock      INTERVAL HPI AND OVERNIGHT EVENTS:  Patient was examined and seen at bedside.     Patient is intubated and sedated with propofol and fentanyl.  On Levophed .05mcg/kg/min.  Patient spiked a low grade fever of 100.4 last night.      OBJECTIVE  PAST MEDICAL & SURGICAL HISTORY  Falls    Congestive heart failure    Transient ischemic attack    FLAVIO on CPAP    Bipolar 1 disorder    Allergic reaction    PVD (peripheral vascular disease)    Other emphysema    Other cardiomyopathy    TIA (transient ischemic attack)    COPD (chronic obstructive pulmonary disease)    Depression    Hypertension    History of cholecystectomy      ALLERGIES:  codeine (Other (Moderate))  Depakote (Unknown)  Dilaudid (Short breath; Rash)  IV Contrast (Anaphylaxis)  losartan (Angioedema)  Risperdal (Other)  verapamil (Short breath; Angioedema)    MEDICATIONS:  STANDING MEDICATIONS  ascorbic acid 500 milliGRAM(s) Oral daily  aspirin  chewable 81 milliGRAM(s) Oral daily  atorvastatin 20 milliGRAM(s) Oral at bedtime  budesonide 160 MICROgram(s)/formoterol 4.5 MICROgram(s) Inhaler 2 Puff(s) Inhalation two times a day  cefepime   IVPB 1000 milliGRAM(s) IV Intermittent every 12 hours  chlorhexidine 0.12% Liquid 15 milliLiter(s) Oral Mucosa every 12 hours  clopidogrel Tablet 75 milliGRAM(s) Oral daily  dabigatran 150 milliGRAM(s) Oral every 12 hours  fentaNYL   Infusion. 0.5 MICROgram(s)/kG/Hr IV Continuous <Continuous>  gabapentin 300 milliGRAM(s) Oral three times a day  lamoTRIgine 100 milliGRAM(s) Oral daily  metoprolol tartrate 100 milliGRAM(s) Oral every 12 hours  midazolam Injectable 2 milliGRAM(s) IV Push once  montelukast 10 milliGRAM(s) Oral daily  multivitamin 1 Tablet(s) Oral daily  norepinephrine Infusion 0.1 MICROgram(s)/kG/Min IV Continuous <Continuous>  pantoprazole    Tablet 40 milliGRAM(s) Oral before breakfast  polyethylene glycol 3350 17 Gram(s) Oral daily  propofol Infusion 10 MICROgram(s)/kG/Min IV Continuous <Continuous>  QUEtiapine 200 milliGRAM(s) Oral at bedtime  senna 2 Tablet(s) Oral at bedtime  sodium chloride 0.9%. 1000 milliLiter(s) IV Continuous <Continuous>  tiotropium 18 MICROgram(s) Capsule 1 Capsule(s) Inhalation daily    PRN MEDICATIONS  acetaminophen  Suppository .. 650 milliGRAM(s) Rectal every 6 hours PRN  ALBUTerol    90 MICROgram(s) HFA Inhaler 1 Puff(s) Inhalation every 4 hours PRN  aluminum hydroxide/magnesium hydroxide/simethicone Suspension 30 milliLiter(s) Oral every 4 hours PRN  guaifenesin/dextromethorphan  Syrup 10 milliLiter(s) Oral every 6 hours PRN      VITAL SIGNS: Last 24 Hours  T(C): 38.4 (31 Dec 2020 10:31), Max: 38.6 (31 Dec 2020 04:00)  T(F): 101.2 (31 Dec 2020 10:31), Max: 101.5 (31 Dec 2020 04:00)  HR: 93 (31 Dec 2020 10:31) (63 - 118)  BP: 94/45 (31 Dec 2020 10:31) (73/40 - 179/77)  BP(mean): 79 (31 Dec 2020 07:00) (0 - 110)  RR: 20 (31 Dec 2020 10:31) (18 - 23)  SpO2: 99% (31 Dec 2020 10:31) (92% - 100%)    LABS:                        8.8    2.36  )-----------( 173      ( 31 Dec 2020 04:30 )             28.8     12-31    140  |  109  |  19  ----------------------------<  105<H>  4.6   |  23  |  0.7    Ca    8.1<L>      31 Dec 2020 04:30  Phos  2.2     12-31  Mg     1.8     12-31    TPro  5.3<L>  /  Alb  2.5<L>  /  TBili  1.0  /  DBili  x   /  AST  38  /  ALT  21  /  AlkPhos  99  12-31        ABG - ( 31 Dec 2020 04:49 )  pH, Arterial: 7.36  pH, Blood: x     /  pCO2: 44    /  pO2: 90    / HCO3: 25    / Base Excess: -0.8  /  SaO2: 98                    Culture - Blood (collected 29 Dec 2020 16:45)  Source: .Blood None  Preliminary Report (31 Dec 2020 01:02):    No growth to date.    Culture - Urine (collected 29 Dec 2020 15:21)  Source: .Urine Clean Catch (Midstream)  Final Report (30 Dec 2020 20:53):    No growth          RADIOLOGY  < from: Xray Chest 1 View- PORTABLE-Routine (Xray Chest 1 View- PORTABLE-Routine in AM.) (12.31.20 @ 06:44) >  Impression:    Bilateral opacifications. Support devices as described.    Unchanged.    < end of copied text >      PHYSICAL EXAM:  CONSTITUTIONAL: Intubated and Sedated   HEAD: Atraumatic, normocephalic  EYES: EOM intact, PERRLA, conjunctiva and sclera clear  ENT: Supple, no masses, no thyromegaly, no bruits, no JVD;   PULMONARY: Decreased BS in the anterior lung fields;   CARDIOVASCULAR: Regular rate and rhythm; no murmurs, rubs, or gallops  GASTROINTESTINAL: Soft, non-tender, non-distended; bowel sounds present  MUSCULOSKELETAL: 2+ peripheral pulses; no clubbing, no cyanosis, no edema  NEUROLOGY: Follows commands, able to squeeze my hands on demand; no cranial neuropathies noted.   SKIN: No rashes or lesions; warm and dry    ASSESSMENT & PLAN  Ms Coates is a 76yo woman with medical history of COPD on 5L home oxygen, paroxysmal afib on pradaxa, carotid stenosis (L 80%, R 40%) s/p L carotid stenting recently , HFpEF (EF 70% Dec 2020), HTN, HLD, bipolar disorder, left parotid mass, recently admitted between 12/7-12/11 for COVID PNA s/p steroids/RDV presented to the ED with chest pain.       # Acute Chronic respiratory failure 2/2 likely due to COVID PNA with septic Shock   # Chest pain- likely pleuritic from COVID  requiring pressors initially now off  CXR 12/29/2020 - Increasing diffuse bilateral parenchymal opacities 12/25  CXR 12/30/2020 3:46AM-->Bilateral infiltrates; L>R; ETT and Central Line in adequate position. Pending CXR read for OGT placement   CXR 12/31/2020--->Increased improvements of opacities on the left side.   Propfol and Fentanyl for Analgesia and Sedation for now   Currently on Levophed at .05; titrate to a MAP>60   CBC demonstrates Hgb of 8.8 (8.6 yesterday) WBC 2.36 w/lymphopenia   CMP demonstrates no JOYCE, ALI or electrolyte abnormalities   CRP--->2.39-->9.57-->9.86  PCT-->1.09-->.33-->6.12   Ferritin-->910-->997--->440   Repeating inflammatory markers this for 1/1/2020 AM   ABG this AM-->7.33/44/90/Arterial Sat 98%-->Keep FiO2 45, , RR 22, PEEP 8; Lip Line on ETT Adjusted from 21 to 24.   1g q12 Vancomycin d/c'd as MRSA PCR -ve; cefepime 1g q12 started;   BCx 12/29/2020 negative  BCx for 12/31/2020 as patient was febrile overnight   ECHO (12/2/2020): Hyperdynamic global left ventricular systolic function; EF of 70 %; Moderately increased LV wall thickness. Grade I diastolic dysfunction.  DTA pending   s/p Toci 400mg 12/29/2020  Patient was previously treated for COVID starting 12/7/2020 w/steroids, RDV.       #Paroxysmal atrial fibrillation w/ RVR   c/w Pradaxa 150mg PO BID   ASA 81 mg  and Plavix recently added after a recent TIA last month.   c/w Metoprolol for rate control    #JOYCE likely pre renal - resolved  baseline: 0.8-1.1  SCr .7 today  K+ 4.6    # Left ICA s/p TCAR (transcarotid artery revascularization) 12/04 with TIA last admission  c/w plavix, pradaxa, ASA  Code stroke on 12/05 for aphasia - workup was negative for acute stroke    #Left intra parotid mass   needs outpt biopsy     # Chronic HFpEF  # HTN  #HLD  /65  Preserved EF according to Dr. Bishop's notes, although no echo report in [place].   ECHO (12/2/2020): Hyperdynamic global left ventricular systolic function; EF of 70 %; Moderately increased LV wall thickness. Grade I diastolic dysfunction.  c/w Atorvastatin  c/w Metoprolol -->Hold if SBP<100      #Bipolar Disorder  Mood stable  Continue home meds Lamictal 100mg PO daily, Seroquel 200mg at bedtime, and Ativan 0.5mg PO twice daily PRN      #Misc  - DVT Prophylaxis: Pradaxa   - Diet: NPO w/tube feeds   - GI Prophylaxis: PPI   - Activity: AAT   - Code Status: Full Code  Dispo: Acute       Dispo:   SHAUN COATES 75y Female  MRN#: [id number]   Hospital Day: 8d    SUBJECTIVE  Patient is a 75y old Female who presents with a chief complaint of chest pain weakness (31 Dec 2020 09:19)  Currently admitted to medicine with the primary diagnosis of Septic shock      INTERVAL HPI AND OVERNIGHT EVENTS:  Patient was examined and seen at bedside.     Patient is intubated and sedated with propofol and fentanyl.  On Levophed .05mcg/kg/min.  Patient spiked a low grade fever of 100.4 last night.      Attending Note: Pt seen and examined at bedside. Pt remains vented. on propfol and fentanyl. Vent reviewed  on vent pt doin , 200 vent, dw Pulm. ET tube was adujsted and pt pulling in good volume now.     OBJECTIVE  PAST MEDICAL & SURGICAL HISTORY  Falls    Congestive heart failure    Transient ischemic attack    FLAVIO on CPAP    Bipolar 1 disorder    Allergic reaction    PVD (peripheral vascular disease)    Other emphysema    Other cardiomyopathy    TIA (transient ischemic attack)    COPD (chronic obstructive pulmonary disease)    Depression    Hypertension    History of cholecystectomy      ALLERGIES:  codeine (Other (Moderate))  Depakote (Unknown)  Dilaudid (Short breath; Rash)  IV Contrast (Anaphylaxis)  losartan (Angioedema)  Risperdal (Other)  verapamil (Short breath; Angioedema)    MEDICATIONS:  STANDING MEDICATIONS  ascorbic acid 500 milliGRAM(s) Oral daily  aspirin  chewable 81 milliGRAM(s) Oral daily  atorvastatin 20 milliGRAM(s) Oral at bedtime  budesonide 160 MICROgram(s)/formoterol 4.5 MICROgram(s) Inhaler 2 Puff(s) Inhalation two times a day  cefepime   IVPB 1000 milliGRAM(s) IV Intermittent every 12 hours  chlorhexidine 0.12% Liquid 15 milliLiter(s) Oral Mucosa every 12 hours  clopidogrel Tablet 75 milliGRAM(s) Oral daily  dabigatran 150 milliGRAM(s) Oral every 12 hours  fentaNYL   Infusion. 0.5 MICROgram(s)/kG/Hr IV Continuous <Continuous>  gabapentin 300 milliGRAM(s) Oral three times a day  lamoTRIgine 100 milliGRAM(s) Oral daily  metoprolol tartrate 100 milliGRAM(s) Oral every 12 hours  midazolam Injectable 2 milliGRAM(s) IV Push once  montelukast 10 milliGRAM(s) Oral daily  multivitamin 1 Tablet(s) Oral daily  norepinephrine Infusion 0.1 MICROgram(s)/kG/Min IV Continuous <Continuous>  pantoprazole    Tablet 40 milliGRAM(s) Oral before breakfast  polyethylene glycol 3350 17 Gram(s) Oral daily  propofol Infusion 10 MICROgram(s)/kG/Min IV Continuous <Continuous>  QUEtiapine 200 milliGRAM(s) Oral at bedtime  senna 2 Tablet(s) Oral at bedtime  sodium chloride 0.9%. 1000 milliLiter(s) IV Continuous <Continuous>  tiotropium 18 MICROgram(s) Capsule 1 Capsule(s) Inhalation daily    PRN MEDICATIONS  acetaminophen  Suppository .. 650 milliGRAM(s) Rectal every 6 hours PRN  ALBUTerol    90 MICROgram(s) HFA Inhaler 1 Puff(s) Inhalation every 4 hours PRN  aluminum hydroxide/magnesium hydroxide/simethicone Suspension 30 milliLiter(s) Oral every 4 hours PRN  guaifenesin/dextromethorphan  Syrup 10 milliLiter(s) Oral every 6 hours PRN      VITAL SIGNS: Last 24 Hours  T(C): 38.4 (31 Dec 2020 10:31), Max: 38.6 (31 Dec 2020 04:00)  T(F): 101.2 (31 Dec 2020 10:31), Max: 101.5 (31 Dec 2020 04:00)  HR: 93 (31 Dec 2020 10:31) (63 - 118)  BP: 94/45 (31 Dec 2020 10:31) (73/40 - 179/77)  BP(mean): 79 (31 Dec 2020 07:00) (0 - 110)  RR: 20 (31 Dec 2020 10:31) (18 - 23)  SpO2: 99% (31 Dec 2020 10:31) (92% - 100%)    LABS:                        8.8    2.36  )-----------( 173      ( 31 Dec 2020 04:30 )             28.8     12-31    140  |  109  |  19  ----------------------------<  105<H>  4.6   |  23  |  0.7    Ca    8.1<L>      31 Dec 2020 04:30  Phos  2.2     12-31  Mg     1.8     12-31    TPro  5.3<L>  /  Alb  2.5<L>  /  TBili  1.0  /  DBili  x   /  AST  38  /  ALT  21  /  AlkPhos  99  12-31        ABG - ( 31 Dec 2020 04:49 )  pH, Arterial: 7.36  pH, Blood: x     /  pCO2: 44    /  pO2: 90    / HCO3: 25    / Base Excess: -0.8  /  SaO2: 98                    Culture - Blood (collected 29 Dec 2020 16:45)  Source: .Blood None  Preliminary Report (31 Dec 2020 01:02):    No growth to date.    Culture - Urine (collected 29 Dec 2020 15:21)  Source: .Urine Clean Catch (Midstream)  Final Report (30 Dec 2020 20:53):    No growth          RADIOLOGY  < from: Xray Chest 1 View- PORTABLE-Routine (Xray Chest 1 View- PORTABLE-Routine in AM.) (12.31.20 @ 06:44) >  Impression:    Bilateral opacifications. Support devices as described.    Unchanged.    < end of copied text >      PHYSICAL EXAM:  CONSTITUTIONAL: Intubated and Sedated   HEAD: Atraumatic, normocephalic  EYES: EOM intact, PERRLA, conjunctiva and sclera clear  ENT: Supple, no masses, no thyromegaly, no bruits, no JVD;   PULMONARY: Decreased BS in the anterior lung fields;   CARDIOVASCULAR: Regular rate and rhythm; no murmurs, rubs, or gallops  GASTROINTESTINAL: Soft, non-tender, non-distended; bowel sounds present  MUSCULOSKELETAL: 2+ peripheral pulses; no clubbing, no cyanosis, no edema  NEUROLOGY: Follows commands, able to squeeze my hands on demand; no cranial neuropathies noted.   SKIN: No rashes or lesions; warm and dry    ASSESSMENT & PLAN  Ms Coates is a 76yo woman with medical history of COPD on 5L home oxygen, paroxysmal afib on pradaxa, carotid stenosis (L 80%, R 40%) s/p L carotid stenting recently , HFpEF (EF 70% Dec 2020), HTN, HLD, bipolar disorder, left parotid mass, recently admitted between 12/7-12/11 for COVID PNA s/p steroids/RDV presented to the ED with chest pain.       # Acute Chronic respiratory failure 2/2 likely due to COVID PNA with septic Shock   # Chest pain- likely pleuritic from COVID  requiring pressors initially now off  CXR 12/29/2020 - Increasing diffuse bilateral parenchymal opacities 12/25  CXR 12/30/2020 3:46AM-->Bilateral infiltrates; L>R; ETT and Central Line in adequate position. Pending CXR read for OGT placement   CXR 12/31/2020--->Increased improvements of opacities on the left side.   Propfol and Fentanyl for Analgesia and Sedation for now   Currently on Levophed at .05; titrate to a MAP>60   CBC demonstrates Hgb of 8.8 (8.6 yesterday) WBC 2.36 w/lymphopenia   CMP demonstrates no JOYCE, ALI or electrolyte abnormalities   CRP--->2.39-->9.57-->9.86  PCT-->1.09-->.33-->6.12   Ferritin-->910-->997--->440   Repeating inflammatory markers this for 1/1/2020 AM   ABG this AM-->7.33/44/90/Arterial Sat 98%-->Keep FiO2 45, , RR 22, PEEP 8; Lip Line on ETT Adjusted from 21 to 24.   1g q12 Vancomycin d/c'd as MRSA PCR -ve; cefepime 1g q12 started;   BCx 12/29/2020 negative  BCx for 12/31/2020 as patient was febrile overnight   ECHO (12/2/2020): Hyperdynamic global left ventricular systolic function; EF of 70 %; Moderately increased LV wall thickness. Grade I diastolic dysfunction.  DTA pending   s/p Toci 400mg 12/29/2020  Patient was previously treated for COVID starting 12/7/2020 w/steroids, RDV.   tylenol for fevers       #Paroxysmal atrial fibrillation w/ RVR   c/w Pradaxa 150mg PO BID   ASA 81 mg  and Plavix recently added after a recent TIA last month.   c/w Metoprolol for rate control    #JOYCE likely pre renal - resolved  baseline: 0.8-1.1  SCr .7 today  K+ 4.6    # Left ICA s/p TCAR (transcarotid artery revascularization) 12/04 with TIA last admission  c/w plavix, pradaxa, ASA  Code stroke on 12/05 for aphasia - workup was negative for acute stroke    #Left intra parotid mass   needs outpt biopsy     # Chronic HFpEF  # HTN  #HLD  /65  Preserved EF according to Dr. Bishop's notes, although no echo report in [place].   ECHO (12/2/2020): Hyperdynamic global left ventricular systolic function; EF of 70 %; Moderately increased LV wall thickness. Grade I diastolic dysfunction.  c/w Atorvastatin  c/w Metoprolol -->Hold if SBP<100      #Bipolar Disorder  Mood stable  Continue home meds Lamictal 100mg PO daily, Seroquel 200mg at bedtime, and Ativan 0.5mg PO twice daily PRN      #Misc  - DVT Prophylaxis: Pradaxa   - Diet: NPO w/tube feeds   - GI Prophylaxis: PPI   - Activity: AAT   - Code Status: Full Code  Dispo: Acute       Dispo:   SHAUN COATES 75y Female  MRN#: [id number]   Hospital Day: 8d    SUBJECTIVE  Patient is a 75y old Female who presents with a chief complaint of chest pain weakness (31 Dec 2020 09:19)  Currently admitted to medicine with the primary diagnosis of Septic shock      INTERVAL HPI AND OVERNIGHT EVENTS:  Patient was examined and seen at bedside.     Patient is intubated and sedated with propofol and fentanyl.  On Levophed .05mcg/kg/min.  Patient spiked a low grade fever of 100.4 last night.      Attending Note: Pt seen and examined at bedside. Pt remains vented. on propfol and fentanyl. Vent reviewed  on vent pt doin , 200 vent, dw Pulm. ET tube was adujsted and pt pulling in good volume now.     OBJECTIVE  PAST MEDICAL & SURGICAL HISTORY  Falls    Congestive heart failure    Transient ischemic attack    FLAVIO on CPAP    Bipolar 1 disorder    Allergic reaction    PVD (peripheral vascular disease)    Other emphysema    Other cardiomyopathy    TIA (transient ischemic attack)    COPD (chronic obstructive pulmonary disease)    Depression    Hypertension    History of cholecystectomy      ALLERGIES:  codeine (Other (Moderate))  Depakote (Unknown)  Dilaudid (Short breath; Rash)  IV Contrast (Anaphylaxis)  losartan (Angioedema)  Risperdal (Other)  verapamil (Short breath; Angioedema)    MEDICATIONS:  STANDING MEDICATIONS  ascorbic acid 500 milliGRAM(s) Oral daily  aspirin  chewable 81 milliGRAM(s) Oral daily  atorvastatin 20 milliGRAM(s) Oral at bedtime  budesonide 160 MICROgram(s)/formoterol 4.5 MICROgram(s) Inhaler 2 Puff(s) Inhalation two times a day  cefepime   IVPB 1000 milliGRAM(s) IV Intermittent every 12 hours  chlorhexidine 0.12% Liquid 15 milliLiter(s) Oral Mucosa every 12 hours  clopidogrel Tablet 75 milliGRAM(s) Oral daily  dabigatran 150 milliGRAM(s) Oral every 12 hours  fentaNYL   Infusion. 0.5 MICROgram(s)/kG/Hr IV Continuous <Continuous>  gabapentin 300 milliGRAM(s) Oral three times a day  lamoTRIgine 100 milliGRAM(s) Oral daily  metoprolol tartrate 100 milliGRAM(s) Oral every 12 hours  midazolam Injectable 2 milliGRAM(s) IV Push once  montelukast 10 milliGRAM(s) Oral daily  multivitamin 1 Tablet(s) Oral daily  norepinephrine Infusion 0.1 MICROgram(s)/kG/Min IV Continuous <Continuous>  pantoprazole    Tablet 40 milliGRAM(s) Oral before breakfast  polyethylene glycol 3350 17 Gram(s) Oral daily  propofol Infusion 10 MICROgram(s)/kG/Min IV Continuous <Continuous>  QUEtiapine 200 milliGRAM(s) Oral at bedtime  senna 2 Tablet(s) Oral at bedtime  sodium chloride 0.9%. 1000 milliLiter(s) IV Continuous <Continuous>  tiotropium 18 MICROgram(s) Capsule 1 Capsule(s) Inhalation daily    PRN MEDICATIONS  acetaminophen  Suppository .. 650 milliGRAM(s) Rectal every 6 hours PRN  ALBUTerol    90 MICROgram(s) HFA Inhaler 1 Puff(s) Inhalation every 4 hours PRN  aluminum hydroxide/magnesium hydroxide/simethicone Suspension 30 milliLiter(s) Oral every 4 hours PRN  guaifenesin/dextromethorphan  Syrup 10 milliLiter(s) Oral every 6 hours PRN      VITAL SIGNS: Last 24 Hours  T(C): 38.4 (31 Dec 2020 10:31), Max: 38.6 (31 Dec 2020 04:00)  T(F): 101.2 (31 Dec 2020 10:31), Max: 101.5 (31 Dec 2020 04:00)  HR: 93 (31 Dec 2020 10:31) (63 - 118)  BP: 94/45 (31 Dec 2020 10:31) (73/40 - 179/77)  BP(mean): 79 (31 Dec 2020 07:00) (0 - 110)  RR: 20 (31 Dec 2020 10:31) (18 - 23)  SpO2: 99% (31 Dec 2020 10:31) (92% - 100%)    LABS:                        8.8    2.36  )-----------( 173      ( 31 Dec 2020 04:30 )             28.8     12-31    140  |  109  |  19  ----------------------------<  105<H>  4.6   |  23  |  0.7    Ca    8.1<L>      31 Dec 2020 04:30  Phos  2.2     12-31  Mg     1.8     12-31    TPro  5.3<L>  /  Alb  2.5<L>  /  TBili  1.0  /  DBili  x   /  AST  38  /  ALT  21  /  AlkPhos  99  12-31        ABG - ( 31 Dec 2020 04:49 )  pH, Arterial: 7.36  pH, Blood: x     /  pCO2: 44    /  pO2: 90    / HCO3: 25    / Base Excess: -0.8  /  SaO2: 98                    Culture - Blood (collected 29 Dec 2020 16:45)  Source: .Blood None  Preliminary Report (31 Dec 2020 01:02):    No growth to date.    Culture - Urine (collected 29 Dec 2020 15:21)  Source: .Urine Clean Catch (Midstream)  Final Report (30 Dec 2020 20:53):    No growth          RADIOLOGY  < from: Xray Chest 1 View- PORTABLE-Routine (Xray Chest 1 View- PORTABLE-Routine in AM.) (12.31.20 @ 06:44) >  Impression:    Bilateral opacifications. Support devices as described.    Unchanged.    < end of copied text >      PHYSICAL EXAM:  CONSTITUTIONAL: Intubated and Sedated   HEAD: Atraumatic, normocephalic  EYES: EOM intact, PERRLA, conjunctiva and sclera clear  ENT: Supple, no masses, no thyromegaly, no bruits, no JVD;   PULMONARY: Decreased BS in the anterior lung fields;   CARDIOVASCULAR: Regular rate and rhythm; no murmurs, rubs, or gallops  GASTROINTESTINAL: Soft, non-tender, non-distended; bowel sounds present  MUSCULOSKELETAL: 2+ peripheral pulses; no clubbing, no cyanosis, no edema  NEUROLOGY: Follows commands, able to squeeze my hands on demand; no cranial neuropathies noted.   SKIN: No rashes or lesions; warm and dry    ASSESSMENT & PLAN  Ms Coates is a 76yo woman with medical history of COPD on 5L home oxygen, paroxysmal afib on pradaxa, carotid stenosis (L 80%, R 40%) s/p L carotid stenting recently , HFpEF (EF 70% Dec 2020), HTN, HLD, bipolar disorder, left parotid mass, recently admitted between 12/7-12/11 for COVID PNA s/p steroids/RDV presented to the ED with chest pain.       # Acute Chronic respiratory failure 2/2 likely due to COVID PNA with septic Shock   # Chest pain- likely pleuritic from COVID  requiring pressors initially now off  CXR 12/29/2020 - Increasing diffuse bilateral parenchymal opacities 12/25  CXR 12/30/2020 3:46AM-->Bilateral infiltrates; L>R; ETT and Central Line in adequate position. Pending CXR read for OGT placement   CXR 12/31/2020--->Increased improvements of opacities on the left side.   Propfol and Fentanyl for Analgesia and Sedation for now   Currently on Levophed at .05; titrate to a MAP>60   CBC demonstrates Hgb of 8.8 (8.6 yesterday) WBC 2.36 w/lymphopenia   CMP demonstrates no JOYCE, ALI or electrolyte abnormalities   CRP--->2.39-->9.57-->9.86  PCT-->1.09-->.33-->6.12   Ferritin-->910-->997--->440   Repeating inflammatory markers this for 1/1/2020 AM   ABG this AM-->7.33/44/90/Arterial Sat 98%-->Keep FiO2 45, , RR 22, PEEP 8; Lip Line on ETT Adjusted from 21 to 24.   1g q12 Vancomycin d/c'd as MRSA PCR -ve; cefepime 1g q12 started;   BCx 12/29/2020 negative  BCx for 12/31/2020 as patient was febrile overnight   ECHO (12/2/2020): Hyperdynamic global left ventricular systolic function; EF of 70 %; Moderately increased LV wall thickness. Grade I diastolic dysfunction.  DTA pending   s/p Toci 400mg 12/29/2020  Patient was previously treated for COVID starting 12/7/2020 w/steroids, RDV.   tylenol for fevers       #Paroxysmal atrial fibrillation w/ RVR   switched Pradaxa 150mg PO BID, to lovenox 80 mg BID as pt is on vent    ASA 81 mg  and Plavix recently added after a recent TIA last month.   c/w Metoprolol for rate control    #JOYCE likely pre renal - resolved  baseline: 0.8-1.1  SCr .7 today  K+ 4.6    # Left ICA s/p TCAR (transcarotid artery revascularization) 12/04 with TIA last admission  c/w plavix, pradaxa, ASA  Code stroke on 12/05 for aphasia - workup was negative for acute stroke    #Left intra parotid mass   needs outpt biopsy     # Chronic HFpEF  # HTN  #HLD  /65  Preserved EF according to Dr. Bishop's notes, although no echo report in [place].   ECHO (12/2/2020): Hyperdynamic global left ventricular systolic function; EF of 70 %; Moderately increased LV wall thickness. Grade I diastolic dysfunction.  c/w Atorvastatin  c/w Metoprolol -->Hold if SBP<100      #Bipolar Disorder  Mood stable  Continue home meds Lamictal 100mg PO daily, Seroquel 200mg at bedtime, and Ativan 0.5mg PO twice daily PRN      #Misc  - DVT Prophylaxis: lovenox 80 mg  bid   - Diet: NPO w/tube feeds   - GI Prophylaxis: PPI   - Activity: AAT   - Code Status: Full Code  Dispo: Acute       Dispo:

## 2020-12-31 NOTE — PROGRESS NOTE ADULT - SUBJECTIVE AND OBJECTIVE BOX
SHAUN COATES  75y, Female  Allergy: codeine (Other (Moderate))  Depakote (Unknown)  Dilaudid (Short breath; Rash)  IV Contrast (Anaphylaxis)  losartan (Angioedema)  Risperdal (Other)  verapamil (Short breath; Angioedema)      LOS  8d    CHIEF COMPLAINT: chest pain weakness (30 Dec 2020 19:03)      INTERVAL EVENTS/HPI  - vented 45%, febrile , CXR decreased opacities   - T(F): , Max: 101.5 (12-31-20 @ 04:00)  - WBC Count: 2.36 (12-31-20 @ 04:30)  WBC Count: 4.62 (12-30-20 @ 04:30)     - Creatinine, Serum: 0.7 (12-31-20 @ 04:30)  Creatinine, Serum: 0.8 (12-30-20 @ 04:30)           VITALS:  T(F): 100.4, Max: 101.5 (12-31-20 @ 04:00)  HR: 97  BP: 90/42  RR: 20Vital Signs Last 24 Hrs  T(C): 38 (31 Dec 2020 06:00), Max: 38.6 (31 Dec 2020 04:00)  T(F): 100.4 (31 Dec 2020 06:00), Max: 101.5 (31 Dec 2020 04:00)  HR: 97 (31 Dec 2020 09:00) (63 - 118)  BP: 90/42 (31 Dec 2020 09:00) (73/40 - 179/77)  BP(mean): 79 (31 Dec 2020 07:00) (0 - 110)  RR: 20 (31 Dec 2020 09:00) (18 - 23)  SpO2: 99% (31 Dec 2020 09:00) (92% - 100%)    FH: Non-contributory  Social Hx: Non-contributory    TESTS & MEASUREMENTS:                        8.8    2.36  )-----------( 173      ( 31 Dec 2020 04:30 )             28.8     12-31    140  |  109  |  19  ----------------------------<  105<H>  4.6   |  23  |  0.7    Ca    8.1<L>      31 Dec 2020 04:30  Phos  2.2     12-31  Mg     1.8     12-31    TPro  5.3<L>  /  Alb  2.5<L>  /  TBili  1.0  /  DBili  x   /  AST  38  /  ALT  21  /  AlkPhos  99  12-31    eGFR if : 100 mL/min/1.73M2 (12-31-20 @ 04:30)  eGFR if Non African American: 86 mL/min/1.73M2 (12-31-20 @ 04:30)    LIVER FUNCTIONS - ( 31 Dec 2020 04:30 )  Alb: 2.5 g/dL / Pro: 5.3 g/dL / ALK PHOS: 99 U/L / ALT: 21 U/L / AST: 38 U/L / GGT: x               Culture - Blood (collected 12-29-20 @ 16:45)  Source: .Blood None  Preliminary Report (12-31-20 @ 01:02):    No growth to date.    Culture - Urine (collected 12-29-20 @ 15:21)  Source: .Urine Clean Catch (Midstream)  Final Report (12-30-20 @ 20:53):    No growth    Culture - Blood (collected 12-25-20 @ 11:27)  Source: .Blood None  Final Report (12-30-20 @ 16:01):    No Growth Final    Culture - Blood (collected 12-24-20 @ 22:53)  Source: .Blood Blood-Venous  Final Report (12-30-20 @ 13:01):    No Growth Final    Culture - Urine (collected 12-23-20 @ 00:07)  Source: .Urine Clean Catch (Midstream)  Final Report (12-24-20 @ 02:06):    <10,000 CFU/mL Normal Urogenital Beronica    Culture - Blood (collected 12-22-20 @ 21:53)  Source: .Blood Blood-Peripheral  Gram Stain (12-24-20 @ 03:50):    Growth in aerobic bottle: Gram Positive Cocci in Clusters  Final Report (12-25-20 @ 09:40):    Growth in anaerobic bottle: Staphylococcus capitis    Coag Negative Staphylococcus    Single set isolate, possible contaminant. Contact    Microbiology if susceptibility testing clinically    indicated.    ***Blood Panel PCR results on this specimen are available    approximately 3 hours after the Gram stain result.***    Gram stain, PCR, and/or culture results may not always    correspond due to difference in methodologies.    ************************************************************    This PCR assay was performed using [employer].    The following targets are tested for: Enterococcus,    vancomycin resistant enterococci, Listeria monocytogenes,    coagulase negative staphylococci, S. aureus,    methicillin resistant S. aureus, Streptococcus agalactiae    (Group B), S. pneumoniae, S. pyogenes (Group A),    Acinetobacter baumannii, Enterobacter cloacae, E. coli,    Klebsiella oxytoca, K. pneumoniae, Proteus sp.,    Serratia marcescens, Haemophilus influenzae,    Neisseria meningitidis, Pseudomonas aeruginosa, Candida    albicans, C. glabrata, C krusei, C parapsilosis,    C. tropicalis and the KPC resistance gene.    "Due to technical problems, Proteus sp. and Pseudomonas    aeruginosa will Not be reported as part of the BCID panel    until further notice".  Organism: Blood Culture PCR (12-25-20 @ 09:40)  Organism: Blood Culture PCR (12-25-20 @ 09:40)      -  Coagulase negative Staphylococcus: Detec      Method Type: PCR    Culture - Blood (collected 12-22-20 @ 21:53)  Source: .Blood Blood-Peripheral  Final Report (12-28-20 @ 07:00):    No Growth Final    Culture - Blood (collected 12-07-20 @ 11:28)  Source: .Blood Blood-Peripheral  Final Report (12-12-20 @ 23:01):    No Growth Final            INFECTIOUS DISEASES TESTING  Procalcitonin, Serum: 6.12 (12-30-20 @ 04:30)  Procalcitonin, Serum: 0.31 (12-29-20 @ 21:31)  Procalcitonin, Serum: 0.33 (12-28-20 @ 16:00)  MRSA PCR Result.: Negative (12-28-20 @ 14:33)  Vancomycin Level, Trough: 11.4 (12-28-20 @ 10:40)  Procalcitonin, Serum: 1.09 (12-25-20 @ 06:34)  Procalcitonin, Serum: 0.31 (12-24-20 @ 22:52)  COVID-19 PCR: Detected (12-23-20 @ 04:50)  Procalcitonin, Serum: 0.84 (12-22-20 @ 21:56)  Procalcitonin, Serum: 0.10 (12-11-20 @ 08:14)  Vancomycin Level, Trough: 16.2 (12-08-20 @ 16:29)  COVID-19 PCR: Detected (12-07-20 @ 12:30)  Procalcitonin, Serum: 0.09 (12-07-20 @ 10:05)  Procalcitonin, Serum: 0.09 (11-29-20 @ 21:30)  COVID-19 PCR: NotDetec (11-29-20 @ 13:30)  Procalcitonin, Serum: 0.10 (11-29-20 @ 13:15)  Rapid RVP Result: NotDetec (10-14-20 @ 18:06)      INFLAMMATORY MARKERS  C-Reactive Protein, Serum: 9.86 mg/dL (12-30-20 @ 04:30)  C-Reactive Protein, Serum: 9.57 mg/dL (12-23-20 @ 07:33)  C-Reactive Protein, Serum: 2.39 mg/dL (12-11-20 @ 08:14)  C-Reactive Protein, Serum: 6.71 mg/dL (12-09-20 @ 05:37)      RADIOLOGY & ADDITIONAL TESTS:  I have personally reviewed the last available Chest xray  CXR  Xray Chest 1 View- PORTABLE-Urgent:   EXAM:  XR CHEST PORTABLE URGENT 1V            PROCEDURE DATE:  12/30/2020            INTERPRETATION:  STUDY INDICATION: OGT placement    Comparison: XR CHEST 12/30/2020 3:46 AM.    Technique/Positioning: Frontal view of the chest.    Findings:    Support devices: New NG tube coursing below left hemidiaphragm out of the field-of-view. Unchanged left IJ venous catheter and ET tube. When telemetry leads.    Cardiac/mediastinum/hilum: Stable appearance of the cardiomediastinal silhouette.    Lung parenchyma/Pleura: Left greater than right bilateral opacities increased in density since the prior exam. No pneumothorax.    Skeleton/soft tissues: Unchanged.    Impression:    New NG tube coursing below left hemidiaphragm out of the field-of-view.    Left greater than right bilateral opacities increased in density since the prior exam. No pneumothorax.                  CHARITY GARCIA MD; Attending Radiologist  This document has been electronically signed. Dec 30 2020 10:48AM (12-30-20 @ 10:45)      CT      CARDIOLOGY TESTING  12 Lead ECG:   Ventricular Rate 176 BPM    Atrial Rate 182 BPM    QRS Duration 84 ms    Q-T Interval 264 ms    QTC Calculation(Bazett) 451 ms    R Axis 25 degrees    T Axis 212 degrees    Diagnosis Line Atrial fibrillation with rapid ventricular response  Marked ST abnormality, possible inferior subendocardial injury  Abnormal ECG    Confirmed by Scott Cowan ([id number]) on 12/27/2020 12:59:53 PM (12-27-20 @ 05:38)  12 Lead ECG:   Ventricular Rate 131 BPM    Atrial Rate 156 BPM    QRS Duration 90 ms    Q-T Interval 322 ms    QTC Calculation(Bazett) 475 ms    R Axis 29 degrees    T Axis 17 degrees    Diagnosis Line Atrial fibrillation with rapid ventricular response  Nonspecific ST and T wave abnormality  Abnormal ECG    Confirmed by YANN MATTSON MD [id number]) on 12/28/2020 12:50:38 PM (12-25-20 @ 08:30)      MEDICATIONS  ascorbic acid 500 Oral daily  aspirin  chewable 81 Oral daily  atorvastatin 20 Oral at bedtime  budesonide 160 MICROgram(s)/formoterol 4.5 MICROgram(s) Inhaler 2 Inhalation two times a day  chlorhexidine 0.12% Liquid 15 Oral Mucosa every 12 hours  clopidogrel Tablet 75 Oral daily  dabigatran 150 Oral every 12 hours  fentaNYL   Infusion. 0.5 IV Continuous <Continuous>  gabapentin 300 Oral three times a day  lamoTRIgine 100 Oral daily  metoprolol tartrate 100 Oral every 12 hours  midazolam Injectable 2 IV Push once  montelukast 10 Oral daily  multivitamin 1 Oral daily  norepinephrine Infusion 0.1 IV Continuous <Continuous>  pantoprazole    Tablet 40 Oral before breakfast  polyethylene glycol 3350 17 Oral daily  propofol Infusion 10 IV Continuous <Continuous>  QUEtiapine 200 Oral at bedtime  senna 2 Oral at bedtime  sodium chloride 0.9%. 1000 IV Continuous <Continuous>  tiotropium 18 MICROgram(s) Capsule 1 Inhalation daily      WEIGHT  Weight (kg): 82.599 (12-23-20 @ 04:25)  Creatinine, Serum: 0.7 mg/dL (12-31-20 @ 04:30)      ANTIBIOTICS:      All available historical records have been reviewed

## 2020-12-31 NOTE — ED ADULT NURSE REASSESSMENT NOTE - NS ED NURSE REASSESS COMMENT FT1
assumed care of pt from CHARLES Klein. Pt is on ventilator, O2 sat 99%. Pt sedated with meds as ordered. Freeman patent, All access lines patent. Will continue to monitor pt.

## 2020-12-31 NOTE — PROGRESS NOTE ADULT - ASSESSMENT
ASSESSMENT  75y F with COPD on 5L home O2, paroxysmal afib, carotid stenosis, HFpEF, HTN, HLD, Bipolar disorder, recent admission D/C 12/11 s/p left TCAR, right groin access  found to be COVID-19 PNA s/p RDV, plasma now admitted with CP     IMPRESSION  #Severe COVID-19 PNA with septic shock requiring pressors     Procalcitonin, Serum: 0.84 ng/mL (12-22-20 @ 21:56) --> Procalcitonin, Serum: 6.12 (12-30-20 @ 04:30)    CXR bilateral opacities , RLL opacity    UA unremarkable UCX NG    COVID-19 PCR: Detected (12-07-20 @ 12:30)   D-Dimer Assay, Quantitative: 2769 ng/mL DDU (12-30-20 @ 04:30)  D-Dimer Assay, Quantitative: 3285 ng/mL DDU (12-29-20 @ 17:07)  Ferritin, Serum: 997 ng/mL (12-28-20 @ 16:00)  #CoNS bacteremia , contaminant     Repeat BCX NG  #JOYCE  #Lactic acidosis  #Obesity BMI (kg/m2): 32.3, 34      RECOMMENDATIONS  - Continue Cefepime 1g q12h IV D9 12/23-, if hemodynamic compromise, broaden to meropenem 1g q8h IV  - f/u deep tracheal CX  - repeat BCX as fever   - s/p 12/30 Tocilizumab 400mg x1, and recheck D-dimer, CRP, Ferritin in 48h       If any questions, please call or send a message on [employer] Teams  Spectra [phone]

## 2020-12-31 NOTE — PROGRESS NOTE ADULT - ASSESSMENT
IMPRESSION:    Sepsis present on admission/ staph capitis  UTI/ prior E Coli in urine  Acute hypoxemic resp failure./ severe Covid pneumonia worsening spiking T SP Intubation  P A fib rapid  COPD on home oxygen  ? stroke   possible bact superinfection, increase procal      PLAN:    CNS: SAT    HEENT:  Oral care    PULMONARY:  HOB @ 45 degrees, Increase RR, PEEP,  ABG, CXR, keep Sao2 92 to 96%    CARDIOVASCULAR: keep equal balance, cardio f/up,     GI: GI prophylaxis                                          Feeding Ngt  RENAL:  F/u  lytes.  Correct as needed. accurate I/O    INFECTIOUS DISEASE: f/up cx, cefepime/, f/up infl markers, ID f/up, repeat cx    HEMATOLOGICAL:  DVT prophylaxis. change pradaxa to lovenox    ENDOCRINE:  Follow up FS.  Insulin protocol if needed.    MUSCULOSKELETAL: bedrest    palliative care eval  DISPOSITION: ED3  poor prognosis  advance directives

## 2021-01-01 LAB
ALBUMIN SERPL ELPH-MCNC: 2.6 G/DL — LOW (ref 3.5–5.2)
ALP SERPL-CCNC: 90 U/L — SIGNIFICANT CHANGE UP (ref 30–115)
ALT FLD-CCNC: 18 U/L — SIGNIFICANT CHANGE UP (ref 0–41)
ANION GAP SERPL CALC-SCNC: 7 MMOL/L — SIGNIFICANT CHANGE UP (ref 7–14)
AST SERPL-CCNC: 28 U/L — SIGNIFICANT CHANGE UP (ref 0–41)
BASE EXCESS BLDA CALC-SCNC: 1 MMOL/L — SIGNIFICANT CHANGE UP (ref -2–2)
BASOPHILS # BLD AUTO: 0.02 K/UL — SIGNIFICANT CHANGE UP (ref 0–0.2)
BASOPHILS NFR BLD AUTO: 0.9 % — SIGNIFICANT CHANGE UP (ref 0–1)
BILIRUB SERPL-MCNC: 0.3 MG/DL — SIGNIFICANT CHANGE UP (ref 0.2–1.2)
BUN SERPL-MCNC: 17 MG/DL — SIGNIFICANT CHANGE UP (ref 10–20)
CALCIUM SERPL-MCNC: 7.9 MG/DL — LOW (ref 8.5–10.1)
CHLORIDE SERPL-SCNC: 107 MMOL/L — SIGNIFICANT CHANGE UP (ref 98–110)
CO2 SERPL-SCNC: 25 MMOL/L — SIGNIFICANT CHANGE UP (ref 17–32)
CREAT SERPL-MCNC: 0.6 MG/DL — LOW (ref 0.7–1.5)
EOSINOPHIL # BLD AUTO: 0.26 K/UL — SIGNIFICANT CHANGE UP (ref 0–0.7)
EOSINOPHIL NFR BLD AUTO: 11.5 % — HIGH (ref 0–8)
GAS PNL BLDA: SIGNIFICANT CHANGE UP
GAS PNL BLDA: SIGNIFICANT CHANGE UP
GLUCOSE BLDC GLUCOMTR-MCNC: 102 MG/DL — HIGH (ref 70–99)
GLUCOSE BLDC GLUCOMTR-MCNC: 159 MG/DL — HIGH (ref 70–99)
GLUCOSE BLDC GLUCOMTR-MCNC: 87 MG/DL — SIGNIFICANT CHANGE UP (ref 70–99)
GLUCOSE SERPL-MCNC: 98 MG/DL — SIGNIFICANT CHANGE UP (ref 70–99)
HCO3 BLDA-SCNC: 26 MMOL/L — SIGNIFICANT CHANGE UP (ref 23–27)
HCT VFR BLD CALC: 26.8 % — LOW (ref 37–47)
HGB BLD-MCNC: 8.2 G/DL — LOW (ref 12–16)
HOROWITZ INDEX BLDA+IHG-RTO: 45 — SIGNIFICANT CHANGE UP
IMM GRANULOCYTES NFR BLD AUTO: 0.4 % — HIGH (ref 0.1–0.3)
LYMPHOCYTES # BLD AUTO: 0.9 K/UL — LOW (ref 1.2–3.4)
LYMPHOCYTES # BLD AUTO: 39.6 % — SIGNIFICANT CHANGE UP (ref 20.5–51.1)
MAGNESIUM SERPL-MCNC: 1.9 MG/DL — SIGNIFICANT CHANGE UP (ref 1.8–2.4)
MCHC RBC-ENTMCNC: 30.3 PG — SIGNIFICANT CHANGE UP (ref 27–31)
MCHC RBC-ENTMCNC: 30.6 G/DL — LOW (ref 32–37)
MCV RBC AUTO: 98.9 FL — SIGNIFICANT CHANGE UP (ref 81–99)
MONOCYTES # BLD AUTO: 0.17 K/UL — SIGNIFICANT CHANGE UP (ref 0.1–0.6)
MONOCYTES NFR BLD AUTO: 7.5 % — SIGNIFICANT CHANGE UP (ref 1.7–9.3)
NEUTROPHILS # BLD AUTO: 0.91 K/UL — LOW (ref 1.4–6.5)
NEUTROPHILS NFR BLD AUTO: 40.1 % — LOW (ref 42.2–75.2)
NRBC # BLD: 0 /100 WBCS — SIGNIFICANT CHANGE UP (ref 0–0)
PCO2 BLDA: 44 MMHG — HIGH (ref 38–42)
PH BLDA: 7.39 — SIGNIFICANT CHANGE UP (ref 7.38–7.42)
PHOSPHATE SERPL-MCNC: 1.5 MG/DL — LOW (ref 2.1–4.9)
PLATELET # BLD AUTO: 215 K/UL — SIGNIFICANT CHANGE UP (ref 130–400)
PO2 BLDA: 88 MMHG — SIGNIFICANT CHANGE UP (ref 78–95)
POTASSIUM SERPL-MCNC: 4.3 MMOL/L — SIGNIFICANT CHANGE UP (ref 3.5–5)
POTASSIUM SERPL-SCNC: 4.3 MMOL/L — SIGNIFICANT CHANGE UP (ref 3.5–5)
PROT SERPL-MCNC: 5 G/DL — LOW (ref 6–8)
RBC # BLD: 2.71 M/UL — LOW (ref 4.2–5.4)
RBC # FLD: 14 % — SIGNIFICANT CHANGE UP (ref 11.5–14.5)
SAO2 % BLDA: 98 % — SIGNIFICANT CHANGE UP (ref 94–98)
SODIUM SERPL-SCNC: 139 MMOL/L — SIGNIFICANT CHANGE UP (ref 135–146)
WBC # BLD: 2.27 K/UL — LOW (ref 4.8–10.8)
WBC # FLD AUTO: 2.27 K/UL — LOW (ref 4.8–10.8)

## 2021-01-01 PROCEDURE — 99233 SBSQ HOSP IP/OBS HIGH 50: CPT | Mod: CS

## 2021-01-01 PROCEDURE — 71045 X-RAY EXAM CHEST 1 VIEW: CPT | Mod: 26

## 2021-01-01 RX ORDER — POTASSIUM PHOSPHATE, MONOBASIC POTASSIUM PHOSPHATE, DIBASIC 236; 224 MG/ML; MG/ML
15 INJECTION, SOLUTION INTRAVENOUS ONCE
Refills: 0 | Status: COMPLETED | OUTPATIENT
Start: 2021-01-01 | End: 2021-01-01

## 2021-01-01 RX ADMIN — ENOXAPARIN SODIUM 80 MILLIGRAM(S): 100 INJECTION SUBCUTANEOUS at 15:45

## 2021-01-01 RX ADMIN — GABAPENTIN 300 MILLIGRAM(S): 400 CAPSULE ORAL at 21:48

## 2021-01-01 RX ADMIN — MONTELUKAST 10 MILLIGRAM(S): 4 TABLET, CHEWABLE ORAL at 12:16

## 2021-01-01 RX ADMIN — GABAPENTIN 300 MILLIGRAM(S): 400 CAPSULE ORAL at 12:16

## 2021-01-01 RX ADMIN — POLYETHYLENE GLYCOL 3350 17 GRAM(S): 17 POWDER, FOR SOLUTION ORAL at 12:13

## 2021-01-01 RX ADMIN — CHLORHEXIDINE GLUCONATE 15 MILLILITER(S): 213 SOLUTION TOPICAL at 05:42

## 2021-01-01 RX ADMIN — LAMOTRIGINE 100 MILLIGRAM(S): 25 TABLET, ORALLY DISINTEGRATING ORAL at 12:19

## 2021-01-01 RX ADMIN — Medication 500 MILLIGRAM(S): at 12:18

## 2021-01-01 RX ADMIN — CHLORHEXIDINE GLUCONATE 15 MILLILITER(S): 213 SOLUTION TOPICAL at 15:43

## 2021-01-01 RX ADMIN — GABAPENTIN 300 MILLIGRAM(S): 400 CAPSULE ORAL at 05:45

## 2021-01-01 RX ADMIN — CEFEPIME 100 MILLIGRAM(S): 1 INJECTION, POWDER, FOR SOLUTION INTRAMUSCULAR; INTRAVENOUS at 15:44

## 2021-01-01 RX ADMIN — CLOPIDOGREL BISULFATE 75 MILLIGRAM(S): 75 TABLET, FILM COATED ORAL at 12:19

## 2021-01-01 RX ADMIN — ATORVASTATIN CALCIUM 20 MILLIGRAM(S): 80 TABLET, FILM COATED ORAL at 21:48

## 2021-01-01 RX ADMIN — PROPOFOL 4.96 MICROGRAM(S)/KG/MIN: 10 INJECTION, EMULSION INTRAVENOUS at 15:55

## 2021-01-01 RX ADMIN — ENOXAPARIN SODIUM 80 MILLIGRAM(S): 100 INJECTION SUBCUTANEOUS at 05:43

## 2021-01-01 RX ADMIN — Medication 81 MILLIGRAM(S): at 12:14

## 2021-01-01 RX ADMIN — Medication 1 TABLET(S): at 12:14

## 2021-01-01 RX ADMIN — SENNA PLUS 2 TABLET(S): 8.6 TABLET ORAL at 21:48

## 2021-01-01 RX ADMIN — QUETIAPINE FUMARATE 200 MILLIGRAM(S): 200 TABLET, FILM COATED ORAL at 21:48

## 2021-01-01 RX ADMIN — PANTOPRAZOLE SODIUM 40 MILLIGRAM(S): 20 TABLET, DELAYED RELEASE ORAL at 12:14

## 2021-01-01 NOTE — PROGRESS NOTE ADULT - ASSESSMENT
76yo woman with medical history of COPD on 5L home oxygen, paroxysmal afib on pradaxa, carotid stenosis (L 80%, R 40%) s/p L carotid stenting recently , HFpEF (EF 70% Dec 2020), HTN, HLD, bipolar disorder, left parotid mass, recently admitted between 12/7-12/11 for COVID PNA s/p steroids/RDV presented to the ED with chest pain.     # Acute Chronic respiratory failure 2/2 likely due to COVID PNA with septic Shock   # Chest pain- likely pleuritic from COVID  requiring pressors initially now off  CXR unchanged today   Propfol and Fentanyl for Analgesia and Sedation   Attempt to SBT today   Repeating inflammatory markers this for 1/1/2020 AM   ABG this AM-->7.33/44/90/Arterial Sat 98%-->Keep FiO2 45, , RR 22, PEEP 8; Lip Line on ETT Adjusted from 21 to 24.   1g q12 Vancomycin d/c'd as MRSA PCR -ve; cefepime 1g q12 started;   BCx 12/29/2020 negative  BCx for 12/31/2020 as patient was febrile overnight   ECHO (12/2/2020): Hyperdynamic global left ventricular systolic function; EF of 70 %; Moderately increased LV wall thickness. Grade I diastolic dysfunction.  DTA pending   s/p Toci 400mg 12/29/2020  Patient was previously treated for COVID starting 12/7/2020 w/steroids, RDV.   tylenol for fevers   pt stable, abg stable, will attempt to sbt today       #Paroxysmal atrial fibrillation w/ RVR   switched Pradaxa 150mg PO BID, to lovenox 80 mg BID as pt is on vent    ASA 81 mg  and Plavix recently added after a recent TIA last month.   c/w Metoprolol for rate control    #JOYCE likely pre renal - resolved  baseline: 0.8-1.1  SCr .7 today  K+ 4.6    # Left ICA s/p TCAR (transcarotid artery revascularization) 12/04 with TIA last admission  c/w plavix, pradaxa, ASA  Code stroke on 12/05 for aphasia - workup was negative for acute stroke    #Left intra parotid mass   needs outpt biopsy     # Chronic HFpEF  # HTN  #HLD  /65  Preserved EF according to Dr. Bishop's notes, although no echo report in [place].   ECHO (12/2/2020): Hyperdynamic global left ventricular systolic function; EF of 70 %; Moderately increased LV wall thickness. Grade I diastolic dysfunction.  c/w Atorvastatin  c/w Metoprolol -->Hold if SBP<100    #Bipolar Disorder  Mood stable  Continue home meds Lamictal 100mg PO daily, Seroquel 200mg at bedtime, and Ativan 0.5mg PO twice daily PRN    #Progress Note Handoff  Pending (specify):  sedation vaction in am, possible SBT  Family discussion: called daughter amara and updated.   Disposition: Home___/SNF___/Other___/Unknown at this time__x_  Pt seen during COVID 19 Pandemic.

## 2021-01-01 NOTE — PROGRESS NOTE ADULT - SUBJECTIVE AND OBJECTIVE BOX
Pt seen and examined at bedside. Vented, following commands     PAST MEDICAL & SURGICAL HISTORY:  Falls    Congestive heart failure    Transient ischemic attack    FLAVIO on CPAP    Bipolar 1 disorder    Allergic reaction    PVD (peripheral vascular disease)    Other emphysema    Other cardiomyopathy    TIA (transient ischemic attack)    COPD (chronic obstructive pulmonary disease)    Depression    Hypertension    History of cholecystectomy        VITAL SIGNS (Last 24 hrs):  T(C): 37.3 (01-01-21 @ 16:04), Max: 37.3 (01-01-21 @ 16:04)  HR: 97 (01-01-21 @ 16:04) (67 - 97)  BP: 93/51 (01-01-21 @ 16:04) (85/49 - 173/45)  RR: 26 (01-01-21 @ 16:04) (22 - 26)  SpO2: 98% (01-01-21 @ 16:04) (98% - 100%)  Wt(kg): --  Daily     Daily     I&O's Summary    31 Dec 2020 07:01  -  01 Jan 2021 07:00  --------------------------------------------------------  IN: 300 mL / OUT: 1040 mL / NET: -740 mL    01 Jan 2021 07:01  -  01 Jan 2021 17:22  --------------------------------------------------------  IN: 366.2 mL / OUT: 700 mL / NET: -333.8 mL        PHYSICAL EXAM:  GENERAL: vented   HEAD:  Atraumatic, Normocephalic  EYES: open, reactive  NECK: Supple, No JVD  CHEST/LUNG: Clear to auscultation bilaterally; No wheeze  HEART: Regular rate and rhythm; No murmurs, rubs, or gallops  ABDOMEN: Soft, Nontender, Nondistended; Bowel sounds present  EXTREMITIES:  2+ Peripheral Pulses, No clubbing, cyanosis, or edema  PSYCH: following commands   NEUROLOGY: non-focal  SKIN: No rashes or lesions    Labs Reviewed  Spoke to patient in regards to abnormal labs.    CBC Full  -  ( 01 Jan 2021 07:24 )  WBC Count : 2.27 K/uL  Hemoglobin : 8.2 g/dL  Hematocrit : 26.8 %  Platelet Count - Automated : 215 K/uL  Mean Cell Volume : 98.9 fL  Mean Cell Hemoglobin : 30.3 pg  Mean Cell Hemoglobin Concentration : 30.6 g/dL  Auto Neutrophil # : 0.91 K/uL  Auto Lymphocyte # : 0.90 K/uL  Auto Monocyte # : 0.17 K/uL  Auto Eosinophil # : 0.26 K/uL  Auto Basophil # : 0.02 K/uL  Auto Neutrophil % : 40.1 %  Auto Lymphocyte % : 39.6 %  Auto Monocyte % : 7.5 %  Auto Eosinophil % : 11.5 %  Auto Basophil % : 0.9 %    BMP:    01-01 @ 07:24    Blood Urea Nitrogen - 17  Calcium - 7.9  Carbond Dioxide - 25  Chloride - 107  Creatinine - 0.6  Glucose - 98  Potassium - 4.3  Sodium - 139      Hemoglobin A1c -     Urine Culture:  12-30 @ 12:00 Urine culture: --    Culture Results:   Normal Respiratory Beronica present  Method Type: --  Organism: --  Organism Identification: --  Specimen Source: .Sputum Deep Tracheal Aspirate  12-29 @ 16:45 Urine culture: --    Culture Results:   No growth to date.  Method Type: --  Organism: --  Organism Identification: --  Specimen Source: .Blood None  12-29 @ 15:21 Urine culture: --    Culture Results:   No growth  Method Type: --  Organism: --  Organism Identification: --  Specimen Source: .Urine Clean Catch (Midstream)        Imaging reviewed: < from: Xray Chest 1 View- PORTABLE-Urgent (Xray Chest 1 View- PORTABLE-Urgent .) (01.01.21 @ 00:21) >  Impression:    Diffuse bilateral parenchymal opacities, unchanged.    Tubes and lines positioned appropriately.    < end of copied text >        MEDICATIONS  (STANDING):  ascorbic acid 500 milliGRAM(s) Oral daily  aspirin  chewable 81 milliGRAM(s) Oral daily  atorvastatin 20 milliGRAM(s) Oral at bedtime  budesonide 160 MICROgram(s)/formoterol 4.5 MICROgram(s) Inhaler 2 Puff(s) Inhalation two times a day  cefepime   IVPB 1000 milliGRAM(s) IV Intermittent every 12 hours  chlorhexidine 0.12% Liquid 15 milliLiter(s) Oral Mucosa every 12 hours  clopidogrel Tablet 75 milliGRAM(s) Oral daily  enoxaparin Injectable 80 milliGRAM(s) SubCutaneous two times a day  fentaNYL   Infusion. 0.5 MICROgram(s)/kG/Hr (4.13 mL/Hr) IV Continuous <Continuous>  gabapentin 300 milliGRAM(s) Oral three times a day  lamoTRIgine 100 milliGRAM(s) Oral daily  metoprolol tartrate 100 milliGRAM(s) Oral every 12 hours  montelukast 10 milliGRAM(s) Oral daily  multivitamin 1 Tablet(s) Oral daily  norepinephrine Infusion 0.1 MICROgram(s)/kG/Min (15.5 mL/Hr) IV Continuous <Continuous>  pantoprazole    Tablet 40 milliGRAM(s) Oral before breakfast  polyethylene glycol 3350 17 Gram(s) Oral daily  potassium phosphate IVPB 15 milliMole(s) IV Intermittent once  propofol Infusion 10 MICROgram(s)/kG/Min (4.96 mL/Hr) IV Continuous <Continuous>  QUEtiapine 200 milliGRAM(s) Oral at bedtime  senna 2 Tablet(s) Oral at bedtime  sodium chloride 0.9%. 1000 milliLiter(s) (75 mL/Hr) IV Continuous <Continuous>  tiotropium 18 MICROgram(s) Capsule 1 Capsule(s) Inhalation daily    MEDICATIONS  (PRN):  acetaminophen  Suppository .. 650 milliGRAM(s) Rectal every 6 hours PRN Temp greater or equal to 38C (100.4F)  ALBUTerol    90 MICROgram(s) HFA Inhaler 1 Puff(s) Inhalation every 4 hours PRN Shortness of Breath  aluminum hydroxide/magnesium hydroxide/simethicone Suspension 30 milliLiter(s) Oral every 4 hours PRN Dyspepsia  guaifenesin/dextromethorphan  Syrup 10 milliLiter(s) Oral every 6 hours PRN Cough

## 2021-01-01 NOTE — ED ADULT NURSE REASSESSMENT NOTE - NS ED NURSE REASSESS COMMENT FT1
per previous RN next tube feed due @ this time. tube feed hung per orders. will continue to monitor pt

## 2021-01-01 NOTE — PROGRESS NOTE ADULT - ASSESSMENT
IMPRESSION:    Sepsis present on admission  UTI/ prior E Coli in urine  Acute hypoxemic resp failure./ severe Covid pneumonia worsening /SP Intubation  P A fib rapid  COPD on home oxygen  ? stroke   possible bact superinfection, increase procal      PLAN:    CNS: SAT    HEENT:  Oral care    PULMONARY:  HOB @ 45 degrees, DEC FIO2 40% F/UP Plateau/ DP,  ABG, keep Sao2 92 to 96%, SBT    CARDIOVASCULAR: keep equal balance, cardio f/up,     GI: GI prophylaxis                                          Feeding Ngt  RENAL:  F/u  lytes.  Correct as needed. accurate I/O    INFECTIOUS DISEASE: f/up cx, cefepime/, f/up infl markers, ID f/up, repeat cx    HEMATOLOGICAL:  DVT prophylaxis. lovenox    ENDOCRINE:  Follow up FS.  Insulin protocol if needed.    MUSCULOSKELETAL: bedrest    DISPOSITION: ED3  poor prognosis  advance directives

## 2021-01-01 NOTE — PROGRESS NOTE ADULT - SUBJECTIVE AND OBJECTIVE BOX
OVERNIGHT EVENTS: events noted, still intubated ventilated, on low dose pressors    Vital Signs Last 24 Hrs  T(C): 37.1 (01 Jan 2021 08:00), Max: 37.2 (31 Dec 2020 21:45)  T(F): 98.7 (01 Jan 2021 08:00), Max: 98.9 (31 Dec 2020 21:45)  HR: 89 (01 Jan 2021 09:00) (67 - 93)  BP: 85/49 (01 Jan 2021 07:51) (80/42 - 173/45)  BP(mean): 68 (01 Jan 2021 05:00) (56 - 84)  RR: 26 (01 Jan 2021 09:00) (18 - 26)  SpO2: 100% (01 Jan 2021 09:00) (98% - 100%)    PHYSICAL EXAMINATION:    GENERAL: ill looking    HEENT: Head is normocephalic and atraumatic.     NECK: Supple.    LUNGS: bl crackles    HEART: Irregular, MARCELINO 3/6    ABDOMEN: Soft, nontender, and nondistended.      EXTREMITIES: swelling +    NEUROLOGIC: Follows commands    SKIN: No ulceration or induration present.      LABS:                        8.2    2.27  )-----------( 215      ( 01 Jan 2021 07:24 )             26.8     01-01    139  |  107  |  17  ----------------------------<  98  4.3   |  25  |  0.6<L>    Ca    7.9<L>      01 Jan 2021 07:24  Phos  1.5     01-01  Mg     1.9     01-01    TPro  5.0<L>  /  Alb  2.6<L>  /  TBili  0.3  /  DBili  x   /  AST  28  /  ALT  18  /  AlkPhos  90  01-01        ABG - ( 01 Jan 2021 00:16 )  pH, Arterial: 7.39  pH, Blood: x     /  pCO2: 44    /  pO2: 88    / HCO3: 26    / Base Excess: 1.0   /  SaO2: 98                          Procalcitonin, Serum: 6.12 ng/mL (12-30-20 @ 04:30)  Procalcitonin, Serum: 0.31 ng/mL (12-29-20 @ 21:31)        12-31-20 @ 07:01  -  01-01-21 @ 07:00  --------------------------------------------------------  IN: 300 mL / OUT: 1040 mL / NET: -740 mL    01-01-21 @ 07:01  -  01-01-21 @ 10:40  --------------------------------------------------------  IN: 366.2 mL / OUT: 350 mL / NET: 16.2 mL        MICROBIOLOGY:  Culture Results:   No growth to date. (12-29 @ 16:45)  Culture Results:   No growth (12-29 @ 15:21)      MEDICATIONS  (STANDING):  ascorbic acid 500 milliGRAM(s) Oral daily  aspirin  chewable 81 milliGRAM(s) Oral daily  atorvastatin 20 milliGRAM(s) Oral at bedtime  budesonide 160 MICROgram(s)/formoterol 4.5 MICROgram(s) Inhaler 2 Puff(s) Inhalation two times a day  cefepime   IVPB 1000 milliGRAM(s) IV Intermittent every 12 hours  chlorhexidine 0.12% Liquid 15 milliLiter(s) Oral Mucosa every 12 hours  clopidogrel Tablet 75 milliGRAM(s) Oral daily  enoxaparin Injectable 80 milliGRAM(s) SubCutaneous two times a day  fentaNYL   Infusion. 0.5 MICROgram(s)/kG/Hr (4.13 mL/Hr) IV Continuous <Continuous>  gabapentin 300 milliGRAM(s) Oral three times a day  lamoTRIgine 100 milliGRAM(s) Oral daily  metoprolol tartrate 100 milliGRAM(s) Oral every 12 hours  montelukast 10 milliGRAM(s) Oral daily  multivitamin 1 Tablet(s) Oral daily  norepinephrine Infusion 0.1 MICROgram(s)/kG/Min (15.5 mL/Hr) IV Continuous <Continuous>  pantoprazole    Tablet 40 milliGRAM(s) Oral before breakfast  polyethylene glycol 3350 17 Gram(s) Oral daily  potassium phosphate IVPB 15 milliMole(s) IV Intermittent once  propofol Infusion 10 MICROgram(s)/kG/Min (4.96 mL/Hr) IV Continuous <Continuous>  QUEtiapine 200 milliGRAM(s) Oral at bedtime  senna 2 Tablet(s) Oral at bedtime  sodium chloride 0.9%. 1000 milliLiter(s) (75 mL/Hr) IV Continuous <Continuous>  tiotropium 18 MICROgram(s) Capsule 1 Capsule(s) Inhalation daily    MEDICATIONS  (PRN):  acetaminophen  Suppository .. 650 milliGRAM(s) Rectal every 6 hours PRN Temp greater or equal to 38C (100.4F)  ALBUTerol    90 MICROgram(s) HFA Inhaler 1 Puff(s) Inhalation every 4 hours PRN Shortness of Breath  aluminum hydroxide/magnesium hydroxide/simethicone Suspension 30 milliLiter(s) Oral every 4 hours PRN Dyspepsia  guaifenesin/dextromethorphan  Syrup 10 milliLiter(s) Oral every 6 hours PRN Cough      RADIOLOGY & ADDITIONAL STUDIES:

## 2021-01-02 LAB
ALBUMIN SERPL ELPH-MCNC: 2.6 G/DL — LOW (ref 3.5–5.2)
ALP SERPL-CCNC: 90 U/L — SIGNIFICANT CHANGE UP (ref 30–115)
ALT FLD-CCNC: 15 U/L — SIGNIFICANT CHANGE UP (ref 0–41)
ANION GAP SERPL CALC-SCNC: 8 MMOL/L — SIGNIFICANT CHANGE UP (ref 7–14)
AST SERPL-CCNC: 24 U/L — SIGNIFICANT CHANGE UP (ref 0–41)
BASE EXCESS BLDA CALC-SCNC: 1.9 MMOL/L — SIGNIFICANT CHANGE UP (ref -2–2)
BASE EXCESS BLDA CALC-SCNC: 2.6 MMOL/L — HIGH (ref -2–2)
BASOPHILS # BLD AUTO: 0.02 K/UL — SIGNIFICANT CHANGE UP (ref 0–0.2)
BASOPHILS NFR BLD AUTO: 0.9 % — SIGNIFICANT CHANGE UP (ref 0–1)
BILIRUB DIRECT SERPL-MCNC: <0.2 MG/DL — SIGNIFICANT CHANGE UP (ref 0–0.2)
BILIRUB INDIRECT FLD-MCNC: >0.1 MG/DL — LOW (ref 0.2–1.2)
BILIRUB SERPL-MCNC: 0.3 MG/DL — SIGNIFICANT CHANGE UP (ref 0.2–1.2)
BUN SERPL-MCNC: 18 MG/DL — SIGNIFICANT CHANGE UP (ref 10–20)
CALCIUM SERPL-MCNC: 8.1 MG/DL — LOW (ref 8.5–10.1)
CHLORIDE SERPL-SCNC: 107 MMOL/L — SIGNIFICANT CHANGE UP (ref 98–110)
CO2 SERPL-SCNC: 24 MMOL/L — SIGNIFICANT CHANGE UP (ref 17–32)
CREAT SERPL-MCNC: 0.7 MG/DL — SIGNIFICANT CHANGE UP (ref 0.7–1.5)
CULTURE RESULTS: SIGNIFICANT CHANGE UP
EOSINOPHIL # BLD AUTO: 0.29 K/UL — SIGNIFICANT CHANGE UP (ref 0–0.7)
EOSINOPHIL NFR BLD AUTO: 12.3 % — HIGH (ref 0–8)
GAS PNL BLDA: SIGNIFICANT CHANGE UP
GLUCOSE BLDC GLUCOMTR-MCNC: 109 MG/DL — HIGH (ref 70–99)
GLUCOSE BLDC GLUCOMTR-MCNC: 128 MG/DL — HIGH (ref 70–99)
GLUCOSE BLDC GLUCOMTR-MCNC: 137 MG/DL — HIGH (ref 70–99)
GLUCOSE SERPL-MCNC: 163 MG/DL — HIGH (ref 70–99)
HCO3 BLDA-SCNC: 26 MMOL/L — SIGNIFICANT CHANGE UP (ref 23–27)
HCO3 BLDA-SCNC: 28 MMOL/L — HIGH (ref 23–27)
HCT VFR BLD CALC: 26.1 % — LOW (ref 37–47)
HGB BLD-MCNC: 8.1 G/DL — LOW (ref 12–16)
HOROWITZ INDEX BLDA+IHG-RTO: 40 — SIGNIFICANT CHANGE UP
IMM GRANULOCYTES NFR BLD AUTO: 0.4 % — HIGH (ref 0.1–0.3)
LYMPHOCYTES # BLD AUTO: 0.91 K/UL — LOW (ref 1.2–3.4)
LYMPHOCYTES # BLD AUTO: 38.7 % — SIGNIFICANT CHANGE UP (ref 20.5–51.1)
MCHC RBC-ENTMCNC: 30.8 PG — SIGNIFICANT CHANGE UP (ref 27–31)
MCHC RBC-ENTMCNC: 31 G/DL — LOW (ref 32–37)
MCV RBC AUTO: 99.2 FL — HIGH (ref 81–99)
MONOCYTES # BLD AUTO: 0.16 K/UL — SIGNIFICANT CHANGE UP (ref 0.1–0.6)
MONOCYTES NFR BLD AUTO: 6.8 % — SIGNIFICANT CHANGE UP (ref 1.7–9.3)
NEUTROPHILS # BLD AUTO: 0.96 K/UL — LOW (ref 1.4–6.5)
NEUTROPHILS NFR BLD AUTO: 40.9 % — LOW (ref 42.2–75.2)
NRBC # BLD: 0 /100 WBCS — SIGNIFICANT CHANGE UP (ref 0–0)
PCO2 BLDA: 40 MMHG — SIGNIFICANT CHANGE UP (ref 38–42)
PCO2 BLDA: 43 MMHG — HIGH (ref 38–42)
PH BLDA: 7.41 — SIGNIFICANT CHANGE UP (ref 7.38–7.42)
PH BLDA: 7.43 — HIGH (ref 7.38–7.42)
PLATELET # BLD AUTO: 211 K/UL — SIGNIFICANT CHANGE UP (ref 130–400)
PO2 BLDA: 92 MMHG — SIGNIFICANT CHANGE UP (ref 78–95)
PO2 BLDA: 95 MMHG — SIGNIFICANT CHANGE UP (ref 78–95)
POTASSIUM SERPL-MCNC: 4.1 MMOL/L — SIGNIFICANT CHANGE UP (ref 3.5–5)
POTASSIUM SERPL-SCNC: 4.1 MMOL/L — SIGNIFICANT CHANGE UP (ref 3.5–5)
PROT SERPL-MCNC: 4.9 G/DL — LOW (ref 6–8)
RBC # BLD: 2.63 M/UL — LOW (ref 4.2–5.4)
RBC # FLD: 14.3 % — SIGNIFICANT CHANGE UP (ref 11.5–14.5)
SAO2 % BLDA: 98 % — SIGNIFICANT CHANGE UP (ref 94–98)
SAO2 % BLDA: 98 % — SIGNIFICANT CHANGE UP (ref 94–98)
SODIUM SERPL-SCNC: 139 MMOL/L — SIGNIFICANT CHANGE UP (ref 135–146)
SPECIMEN SOURCE: SIGNIFICANT CHANGE UP
WBC # BLD: 2.35 K/UL — LOW (ref 4.8–10.8)
WBC # FLD AUTO: 2.35 K/UL — LOW (ref 4.8–10.8)

## 2021-01-02 PROCEDURE — 99233 SBSQ HOSP IP/OBS HIGH 50: CPT | Mod: CS

## 2021-01-02 PROCEDURE — 99232 SBSQ HOSP IP/OBS MODERATE 35: CPT | Mod: CS

## 2021-01-02 PROCEDURE — 71045 X-RAY EXAM CHEST 1 VIEW: CPT | Mod: 26

## 2021-01-02 RX ORDER — FUROSEMIDE 40 MG
40 TABLET ORAL ONCE
Refills: 0 | Status: COMPLETED | OUTPATIENT
Start: 2021-01-02 | End: 2021-01-02

## 2021-01-02 RX ORDER — DEXMEDETOMIDINE HYDROCHLORIDE IN 0.9% SODIUM CHLORIDE 4 UG/ML
0.2 INJECTION INTRAVENOUS
Qty: 200 | Refills: 0 | Status: DISCONTINUED | OUTPATIENT
Start: 2021-01-02 | End: 2021-01-05

## 2021-01-02 RX ADMIN — PANTOPRAZOLE SODIUM 40 MILLIGRAM(S): 20 TABLET, DELAYED RELEASE ORAL at 09:31

## 2021-01-02 RX ADMIN — DEXMEDETOMIDINE HYDROCHLORIDE IN 0.9% SODIUM CHLORIDE 4.13 MICROGRAM(S)/KG/HR: 4 INJECTION INTRAVENOUS at 21:07

## 2021-01-02 RX ADMIN — CEFEPIME 100 MILLIGRAM(S): 1 INJECTION, POWDER, FOR SOLUTION INTRAMUSCULAR; INTRAVENOUS at 17:29

## 2021-01-02 RX ADMIN — ENOXAPARIN SODIUM 80 MILLIGRAM(S): 100 INJECTION SUBCUTANEOUS at 17:29

## 2021-01-02 RX ADMIN — CEFEPIME 100 MILLIGRAM(S): 1 INJECTION, POWDER, FOR SOLUTION INTRAMUSCULAR; INTRAVENOUS at 06:00

## 2021-01-02 RX ADMIN — CLOPIDOGREL BISULFATE 75 MILLIGRAM(S): 75 TABLET, FILM COATED ORAL at 11:33

## 2021-01-02 RX ADMIN — CHLORHEXIDINE GLUCONATE 15 MILLILITER(S): 213 SOLUTION TOPICAL at 11:35

## 2021-01-02 RX ADMIN — ATORVASTATIN CALCIUM 20 MILLIGRAM(S): 80 TABLET, FILM COATED ORAL at 21:07

## 2021-01-02 RX ADMIN — Medication 500 MILLIGRAM(S): at 11:25

## 2021-01-02 RX ADMIN — LAMOTRIGINE 100 MILLIGRAM(S): 25 TABLET, ORALLY DISINTEGRATING ORAL at 11:29

## 2021-01-02 RX ADMIN — POLYETHYLENE GLYCOL 3350 17 GRAM(S): 17 POWDER, FOR SOLUTION ORAL at 11:34

## 2021-01-02 RX ADMIN — QUETIAPINE FUMARATE 200 MILLIGRAM(S): 200 TABLET, FILM COATED ORAL at 21:07

## 2021-01-02 RX ADMIN — BUDESONIDE AND FORMOTEROL FUMARATE DIHYDRATE 2 PUFF(S): 160; 4.5 AEROSOL RESPIRATORY (INHALATION) at 04:38

## 2021-01-02 RX ADMIN — Medication 40 MILLIGRAM(S): at 21:40

## 2021-01-02 RX ADMIN — SENNA PLUS 2 TABLET(S): 8.6 TABLET ORAL at 21:08

## 2021-01-02 RX ADMIN — Medication 1 TABLET(S): at 11:32

## 2021-01-02 RX ADMIN — DEXMEDETOMIDINE HYDROCHLORIDE IN 0.9% SODIUM CHLORIDE 4.13 MICROGRAM(S)/KG/HR: 4 INJECTION INTRAVENOUS at 12:47

## 2021-01-02 RX ADMIN — Medication 81 MILLIGRAM(S): at 11:31

## 2021-01-02 RX ADMIN — MONTELUKAST 10 MILLIGRAM(S): 4 TABLET, CHEWABLE ORAL at 11:34

## 2021-01-02 RX ADMIN — GABAPENTIN 300 MILLIGRAM(S): 400 CAPSULE ORAL at 11:35

## 2021-01-02 RX ADMIN — ENOXAPARIN SODIUM 80 MILLIGRAM(S): 100 INJECTION SUBCUTANEOUS at 06:01

## 2021-01-02 RX ADMIN — GABAPENTIN 300 MILLIGRAM(S): 400 CAPSULE ORAL at 06:02

## 2021-01-02 RX ADMIN — CHLORHEXIDINE GLUCONATE 15 MILLILITER(S): 213 SOLUTION TOPICAL at 06:01

## 2021-01-02 RX ADMIN — GABAPENTIN 300 MILLIGRAM(S): 400 CAPSULE ORAL at 21:08

## 2021-01-02 NOTE — PROGRESS NOTE ADULT - SUBJECTIVE AND OBJECTIVE BOX
OVERNIGHT EVENTS: events noted, still intubated, ventilated, on low dose levophed, propofol fentanyl    Vital Signs Last 24 Hrs  T(C): 36.6 (02 Jan 2021 09:00), Max: 37.3 (01 Jan 2021 16:04)  T(F): 97.8 (02 Jan 2021 09:00), Max: 99.1 (01 Jan 2021 16:04)  HR: 99 (02 Jan 2021 09:00) (70 - 103)  BP: 90/50 (01 Jan 2021 22:00) (90/50 - 115/57)  BP(mean): 0 (01 Jan 2021 21:00) (0 - 61)  RR: 26 (02 Jan 2021 09:00) (25 - 26)  SpO2: 100% (02 Jan 2021 09:00) (98% - 100%)    PHYSICAL EXAMINATION:    GENERAL: ill looking    HEENT: Head is normocephalic and atraumatic.     NECK: Supple.    LUNGS: bl crackles    HEART: Regular rate and rhythm without murmur.    ABDOMEN: Soft, nontender, and nondistended.      EXTREMITIES: Without any cyanosis, clubbing, rash, lesions or edema.    NEUROLOGIC: follows commands    SKIN: No ulceration or induration present.      LABS:                        8.1    2.35  )-----------( 211      ( 02 Jan 2021 04:30 )             26.1     01-02    139  |  107  |  18  ----------------------------<  163<H>  4.1   |  24  |  0.7    Ca    8.1<L>      02 Jan 2021 04:30  Phos  1.5     01-01  Mg     1.9     01-01    TPro  4.9<L>  /  Alb  2.6<L>  /  TBili  0.3  /  DBili  <0.2  /  AST  24  /  ALT  15  /  AlkPhos  90  01-02        ABG - ( 02 Jan 2021 02:51 )  pH, Arterial: 7.43  pH, Blood: x     /  pCO2: 40    /  pO2: 92    / HCO3: 26    / Base Excess: 1.9   /  SaO2: 98        45%                        01-01-21 @ 07:01  -  01-02-21 @ 07:00  --------------------------------------------------------  IN: 1826.9 mL / OUT: 1425 mL / NET: 401.9 mL    01-02-21 @ 07:01  -  01-02-21 @ 10:48  --------------------------------------------------------  IN: 425 mL / OUT: 0 mL / NET: 425 mL        MICROBIOLOGY:  Culture Results:   No growth to date. (12-31 @ 11:42)  Culture Results:   Normal Respiratory Beronica present (12-30 @ 12:00)  Culture Results:   No growth to date. (12-29 @ 16:45)  Culture Results:   No growth (12-29 @ 15:21)      MEDICATIONS  (STANDING):  ascorbic acid 500 milliGRAM(s) Oral daily  aspirin  chewable 81 milliGRAM(s) Oral daily  atorvastatin 20 milliGRAM(s) Oral at bedtime  budesonide 160 MICROgram(s)/formoterol 4.5 MICROgram(s) Inhaler 2 Puff(s) Inhalation two times a day  cefepime   IVPB 1000 milliGRAM(s) IV Intermittent every 12 hours  chlorhexidine 0.12% Liquid 15 milliLiter(s) Oral Mucosa every 12 hours  clopidogrel Tablet 75 milliGRAM(s) Oral daily  enoxaparin Injectable 80 milliGRAM(s) SubCutaneous two times a day  fentaNYL   Infusion. 0.5 MICROgram(s)/kG/Hr (4.13 mL/Hr) IV Continuous <Continuous>  gabapentin 300 milliGRAM(s) Oral three times a day  lamoTRIgine 100 milliGRAM(s) Oral daily  metoprolol tartrate 100 milliGRAM(s) Oral every 12 hours  montelukast 10 milliGRAM(s) Oral daily  multivitamin 1 Tablet(s) Oral daily  norepinephrine Infusion 0.1 MICROgram(s)/kG/Min (15.5 mL/Hr) IV Continuous <Continuous>  pantoprazole    Tablet 40 milliGRAM(s) Oral before breakfast  polyethylene glycol 3350 17 Gram(s) Oral daily  potassium phosphate IVPB 15 milliMole(s) IV Intermittent once  propofol Infusion 10 MICROgram(s)/kG/Min (4.96 mL/Hr) IV Continuous <Continuous>  QUEtiapine 200 milliGRAM(s) Oral at bedtime  senna 2 Tablet(s) Oral at bedtime  sodium chloride 0.9%. 1000 milliLiter(s) (75 mL/Hr) IV Continuous <Continuous>  tiotropium 18 MICROgram(s) Capsule 1 Capsule(s) Inhalation daily    MEDICATIONS  (PRN):  acetaminophen  Suppository .. 650 milliGRAM(s) Rectal every 6 hours PRN Temp greater or equal to 38C (100.4F)  ALBUTerol    90 MICROgram(s) HFA Inhaler 1 Puff(s) Inhalation every 4 hours PRN Shortness of Breath  aluminum hydroxide/magnesium hydroxide/simethicone Suspension 30 milliLiter(s) Oral every 4 hours PRN Dyspepsia  guaifenesin/dextromethorphan  Syrup 10 milliLiter(s) Oral every 6 hours PRN Cough      RADIOLOGY & ADDITIONAL STUDIES:

## 2021-01-02 NOTE — PROGRESS NOTE ADULT - ASSESSMENT
ASSESSMENT  75y F with COPD on 5L home O2, paroxysmal afib, carotid stenosis, HFpEF, HTN, HLD, Bipolar disorder, recent admission D/C 12/11 s/p left TCAR, right groin access  found to be COVID-19 PNA s/p RDV, plasma now admitted with CP     IMPRESSION  #Severe COVID-19 PNA with septic shock requiring pressors     12/30 tracheal aspirate    Procalcitonin, Serum: 0.84 ng/mL (12-22-20 @ 21:56) --> Procalcitonin, Serum: 6.12 (12-30-20 @ 04:30)    CXR bilateral opacities , RLL opacity    UA unremarkable UCX NG    COVID-19 PCR: Detected (12-07-20 @ 12:30)   D-Dimer Assay, Quantitative: 2769 ng/mL DDU (12-30-20 @ 04:30)  D-Dimer Assay, Quantitative: 3285 ng/mL DDU (12-29-20 @ 17:07)  Ferritin, Serum: 997 ng/mL (12-28-20 @ 16:00)  #CoNS bacteremia , contaminant     Repeat BCX NG  #JOYCE  #Lactic acidosis  #Obesity BMI (kg/m2): 32.3, 34      RECOMMENDATIONS  - Continue Cefepime 1g q12h IV D11 12/23-, consider D/C 1/3  - s/p 12/30 Tocilizumab 400mg x1      If any questions, please call or send a message on [employer] Teams  Spectra [phone]

## 2021-01-02 NOTE — PROGRESS NOTE ADULT - ASSESSMENT
76yo woman with medical history of COPD on 5L home oxygen, paroxysmal afib on pradaxa, carotid stenosis (L 80%, R 40%) s/p L carotid stenting recently , HFpEF (EF 70% Dec 2020), HTN, HLD, bipolar disorder, left parotid mass, recently admitted between 12/7-12/11 for COVID PNA s/p steroids/RDV presented to the ED with chest pain.     # Acute Chronic respiratory failure 2/2 likely due to COVID PNA with septic Shock   # Chest pain- likely pleuritic from COVID  requiring pressors initially now off  CXR unchanged today   Propfol and Fentanyl for Analgesia and Sedation   Attempt to SBT today   Repeating inflammatory markers this for 1/1/2020 AM   ABG this AM-->7.33/44/90/Arterial Sat 98%-->Keep FiO2 45, , RR 22, PEEP 8; Lip Line on ETT Adjusted from 21 to 24.   1g q12 Vancomycin d/c'd as MRSA PCR -ve; cefepime 1g q12 started;   BCx 12/29/2020 negative  BCx for 12/31/2020 as patient was febrile overnight   ECHO (12/2/2020): Hyperdynamic global left ventricular systolic function; EF of 70 %; Moderately increased LV wall thickness. Grade I diastolic dysfunction.  DTA pending   s/p Toci 400mg 12/29/2020  Patient was previously treated for COVID starting 12/7/2020 w/steroids, RDV.   tylenol for fevers   pt stable, abg stable, will attempt to sbt today   will wean off propofol and start precedex to assist with SBT   Extubate to bipap/HF when time.   Lasix prior to extubation       #Paroxysmal atrial fibrillation w/ RVR   switched Pradaxa 150mg PO BID, to lovenox 80 mg BID as pt is on vent    ASA 81 mg  and Plavix recently added after a recent TIA last month.   c/w Metoprolol for rate control    #JOYCE likely pre renal - resolved  baseline: 0.8-1.1  SCr .7 today  K+ 4.6    # Left ICA s/p TCAR (transcarotid artery revascularization) 12/04 with TIA last admission  c/w plavix, pradaxa, ASA  Code stroke on 12/05 for aphasia - workup was negative for acute stroke    #Left intra parotid mass   needs outpt biopsy     # Chronic HFpEF  # HTN  #HLD  /65  Preserved EF according to Dr. Bishop's notes, although no echo report in [place].   ECHO (12/2/2020): Hyperdynamic global left ventricular systolic function; EF of 70 %; Moderately increased LV wall thickness. Grade I diastolic dysfunction.  c/w Atorvastatin  c/w Metoprolol -->Hold if SBP<100    #Bipolar Disorder  Mood stable  Continue home meds Lamictal 100mg PO daily, Seroquel 200mg at bedtime, and Ativan 0.5mg PO twice daily PRN    #Progress Note Handoff  Pending (specify):  possible SBT today, stable for now  Family discussion: called daughter amara and updated.   Disposition: Home___/SNF___/Other___/Unknown at this time__x_  Pt seen during COVID 19 Pandemic.

## 2021-01-02 NOTE — PROGRESS NOTE ADULT - SUBJECTIVE AND OBJECTIVE BOX
Pt seen and examined at bedside. Pt remains vented. Opens eyes follows commands.  SBT today     PAST MEDICAL & SURGICAL HISTORY:  Falls    Congestive heart failure    Transient ischemic attack    FLAVIO on CPAP    Bipolar 1 disorder    Allergic reaction    PVD (peripheral vascular disease)    Other emphysema    Other cardiomyopathy    TIA (transient ischemic attack)    COPD (chronic obstructive pulmonary disease)    Depression    Hypertension    History of cholecystectomy        VITAL SIGNS (Last 24 hrs):  T(C): 36.5 (01-02-21 @ 16:17), Max: 37.3 (01-01-21 @ 17:46)  HR: 72 (01-02-21 @ 16:17) (71 - 103)  BP: 107/52 (01-02-21 @ 16:17) (90/50 - 131/64)  RR: 26 (01-02-21 @ 16:17) (25 - 33)  SpO2: 100% (01-02-21 @ 16:17) (99% - 100%)  Wt(kg): --  Daily     Daily     I&O's Summary    01 Jan 2021 07:01  -  02 Jan 2021 07:00  --------------------------------------------------------  IN: 1826.9 mL / OUT: 1425 mL / NET: 401.9 mL    02 Jan 2021 07:01  -  02 Jan 2021 16:40  --------------------------------------------------------  IN: 850 mL / OUT: 900 mL / NET: -50 mL        PHYSICAL EXAM:  GENERAL: Vented, Follows commands   HEAD:  Atraumatic, Normocephalic  EYES: EOMI, PERRLA, conjunctiva and sclera clear  NECK: Supple, No JVD  CHEST/LUNG: mild rales   HEART: Regular rate and rhythm; No murmurs, rubs, or gallops  ABDOMEN: Soft, Nontender, Nondistended; Bowel sounds present  EXTREMITIES:  2+ Peripheral Pulses, No clubbing, cyanosis, or edema  PSYCH: alert following commands   NEUROLOGY: follows commands   SKIN: No rashes or lesions    Labs Reviewed  Spoke to patient in regards to abnormal labs.    CBC Full  -  ( 02 Jan 2021 04:30 )  WBC Count : 2.35 K/uL  Hemoglobin : 8.1 g/dL  Hematocrit : 26.1 %  Platelet Count - Automated : 211 K/uL  Mean Cell Volume : 99.2 fL  Mean Cell Hemoglobin : 30.8 pg  Mean Cell Hemoglobin Concentration : 31.0 g/dL  Auto Neutrophil # : 0.96 K/uL  Auto Lymphocyte # : 0.91 K/uL  Auto Monocyte # : 0.16 K/uL  Auto Eosinophil # : 0.29 K/uL  Auto Basophil # : 0.02 K/uL  Auto Neutrophil % : 40.9 %  Auto Lymphocyte % : 38.7 %  Auto Monocyte % : 6.8 %  Auto Eosinophil % : 12.3 %  Auto Basophil % : 0.9 %    BMP:    01-02 @ 04:30    Blood Urea Nitrogen - 18  Calcium - 8.1  Carbond Dioxide - 24  Chloride - 107  Creatinine - 0.7  Glucose - 163  Potassium - 4.1  Sodium - 139      Hemoglobin A1c -     Urine Culture:  01-01 @ 07:24 Urine culture: --    Culture Results:   No growth to date.  Method Type: --  Organism: --  Organism Identification: --  Specimen Source: .Blood None  12-31 @ 11:42 Urine culture: --    Culture Results:   No growth to date.  Method Type: --  Organism: --  Organism Identification: --  Specimen Source: .Blood None  12-30 @ 12:00 Urine culture: --    Culture Results:   Normal Respiratory Beronica present  Method Type: --  Organism: --  Organism Identification: --  Specimen Source: .Sputum Deep Tracheal Aspirate  12-29 @ 16:45 Urine culture: --    Culture Results:   No growth to date.  Method Type: --  Organism: --  Organism Identification: --  Specimen Source: .Blood None  12-29 @ 15:21 Urine culture: --    Culture Results:   No growth  Method Type: --  Organism: --  Organism Identification: --  Specimen Source: .Urine Clean Catch (Midstream)        Imaging reviewed      MEDICATIONS  (STANDING):  ascorbic acid 500 milliGRAM(s) Oral daily  aspirin  chewable 81 milliGRAM(s) Oral daily  atorvastatin 20 milliGRAM(s) Oral at bedtime  budesonide 160 MICROgram(s)/formoterol 4.5 MICROgram(s) Inhaler 2 Puff(s) Inhalation two times a day  cefepime   IVPB 1000 milliGRAM(s) IV Intermittent every 12 hours  chlorhexidine 0.12% Liquid 15 milliLiter(s) Oral Mucosa every 12 hours  clopidogrel Tablet 75 milliGRAM(s) Oral daily  dexMEDEtomidine Infusion 0.2 MICROgram(s)/kG/Hr (4.13 mL/Hr) IV Continuous <Continuous>  enoxaparin Injectable 80 milliGRAM(s) SubCutaneous two times a day  fentaNYL   Infusion. 0.5 MICROgram(s)/kG/Hr (4.13 mL/Hr) IV Continuous <Continuous>  furosemide   Injectable 40 milliGRAM(s) IV Push once  gabapentin 300 milliGRAM(s) Oral three times a day  lamoTRIgine 100 milliGRAM(s) Oral daily  metoprolol tartrate 100 milliGRAM(s) Oral every 12 hours  montelukast 10 milliGRAM(s) Oral daily  multivitamin 1 Tablet(s) Oral daily  norepinephrine Infusion 0.1 MICROgram(s)/kG/Min (15.5 mL/Hr) IV Continuous <Continuous>  pantoprazole    Tablet 40 milliGRAM(s) Oral before breakfast  polyethylene glycol 3350 17 Gram(s) Oral daily  potassium phosphate IVPB 15 milliMole(s) IV Intermittent once  propofol Infusion 10 MICROgram(s)/kG/Min (4.96 mL/Hr) IV Continuous <Continuous>  QUEtiapine 200 milliGRAM(s) Oral at bedtime  senna 2 Tablet(s) Oral at bedtime  sodium chloride 0.9%. 1000 milliLiter(s) (75 mL/Hr) IV Continuous <Continuous>  tiotropium 18 MICROgram(s) Capsule 1 Capsule(s) Inhalation daily    MEDICATIONS  (PRN):  acetaminophen  Suppository .. 650 milliGRAM(s) Rectal every 6 hours PRN Temp greater or equal to 38C (100.4F)  ALBUTerol    90 MICROgram(s) HFA Inhaler 1 Puff(s) Inhalation every 4 hours PRN Shortness of Breath  aluminum hydroxide/magnesium hydroxide/simethicone Suspension 30 milliLiter(s) Oral every 4 hours PRN Dyspepsia  guaifenesin/dextromethorphan  Syrup 10 milliLiter(s) Oral every 6 hours PRN Cough

## 2021-01-02 NOTE — CHART NOTE - NSCHARTNOTEFT_GEN_A_CORE
Patient was seen and examined today. Care discussed during rounds with the hospitalist. As discussed during AM rounds:     - Patient failed SAT yesterday  - Discontinue propofol and start Precedex  - As propofol wears off, monitor pressor requirements  - If pressor requirements improve, will give furosemide 40mg IV x1 dose  - SBT today with ABG  - Possible extubation if patient tolerates SBT

## 2021-01-02 NOTE — PROGRESS NOTE ADULT - SUBJECTIVE AND OBJECTIVE BOX
SHAUN COATES  75y, Female  Allergy: codeine (Other (Moderate))  Depakote (Unknown)  Dilaudid (Short breath; Rash)  IV Contrast (Anaphylaxis)  losartan (Angioedema)  Risperdal (Other)  verapamil (Short breath; Angioedema)      LOS  10d    CHIEF COMPLAINT: chest pain weakness (02 Jan 2021 10:48)      INTERVAL EVENTS/HPI  - vented 40%  - T(F): , Max: 99.1 (01-01-21 @ 16:04)  - WBC Count: 2.35 (01-02-21 @ 04:30)  WBC Count: 2.27 (01-01-21 @ 07:24)     - Creatinine, Serum: 0.7 (01-02-21 @ 04:30)  Creatinine, Serum: 0.6 (01-01-21 @ 07:24)           VITALS:  T(F): 97.8, Max: 99.1 (01-01-21 @ 16:04)  HR: 99  BP: 90/50  RR: 26Vital Signs Last 24 Hrs  T(C): 36.6 (02 Jan 2021 09:00), Max: 37.3 (01 Jan 2021 16:04)  T(F): 97.8 (02 Jan 2021 09:00), Max: 99.1 (01 Jan 2021 16:04)  HR: 99 (02 Jan 2021 09:00) (70 - 103)  BP: 90/50 (01 Jan 2021 22:00) (90/50 - 115/57)  BP(mean): 0 (01 Jan 2021 21:00) (0 - 0)  RR: 26 (02 Jan 2021 09:00) (25 - 26)  SpO2: 100% (02 Jan 2021 09:00) (98% - 100%)    FH: Non-contributory  Social Hx: Non-contributory    TESTS & MEASUREMENTS:                        8.1    2.35  )-----------( 211      ( 02 Jan 2021 04:30 )             26.1     01-02    139  |  107  |  18  ----------------------------<  163<H>  4.1   |  24  |  0.7    Ca    8.1<L>      02 Jan 2021 04:30  Phos  1.5     01-01  Mg     1.9     01-01    TPro  4.9<L>  /  Alb  2.6<L>  /  TBili  0.3  /  DBili  <0.2  /  AST  24  /  ALT  15  /  AlkPhos  90  01-02    eGFR if Non African American: 86 mL/min/1.73M2 (01-02-21 @ 04:30)  eGFR if African American: 100 mL/min/1.73M2 (01-02-21 @ 04:30)    LIVER FUNCTIONS - ( 02 Jan 2021 04:30 )  Alb: 2.6 g/dL / Pro: 4.9 g/dL / ALK PHOS: 90 U/L / ALT: 15 U/L / AST: 24 U/L / GGT: x               Culture - Blood (collected 01-01-21 @ 07:24)  Source: .Blood None  Preliminary Report (01-02-21 @ 12:01):    No growth to date.    Culture - Blood (collected 12-31-20 @ 11:42)  Source: .Blood None  Preliminary Report (01-01-21 @ 22:02):    No growth to date.    Culture - Sputum (collected 12-30-20 @ 12:00)  Source: .Sputum Deep Tracheal Aspirate  Gram Stain (12-31-20 @ 12:46):    Few polymorphonuclear leukocytes per low power field    Few Squamous epithelial cells per low power field    Few Gram Positive Cocci in Pairs and Chains per oil power field  Final Report (01-02-21 @ 11:26):    Normal Respiratory Beronica present    Culture - Blood (collected 12-29-20 @ 16:45)  Source: .Blood None  Preliminary Report (12-31-20 @ 01:02):    No growth to date.    Culture - Urine (collected 12-29-20 @ 15:21)  Source: .Urine Clean Catch (Midstream)  Final Report (12-30-20 @ 20:53):    No growth    Culture - Blood (collected 12-25-20 @ 11:27)  Source: .Blood None  Final Report (12-30-20 @ 16:01):    No Growth Final    Culture - Blood (collected 12-24-20 @ 22:53)  Source: .Blood Blood-Venous  Final Report (12-30-20 @ 13:01):    No Growth Final    Culture - Urine (collected 12-23-20 @ 00:07)  Source: .Urine Clean Catch (Midstream)  Final Report (12-24-20 @ 02:06):    <10,000 CFU/mL Normal Urogenital Beronica    Culture - Blood (collected 12-22-20 @ 21:53)  Source: .Blood Blood-Peripheral  Gram Stain (12-24-20 @ 03:50):    Growth in aerobic bottle: Gram Positive Cocci in Clusters  Final Report (12-25-20 @ 09:40):    Growth in anaerobic bottle: Staphylococcus capitis    Coag Negative Staphylococcus    Single set isolate, possible contaminant. Contact    Microbiology if susceptibility testing clinically    indicated.    ***Blood Panel PCR results on this specimen are available    approximately 3 hours after the Gram stain result.***    Gram stain, PCR, and/or culture results may not always    correspond due to difference in methodologies.    ************************************************************    This PCR assay was performed using [employer].    The following targets are tested for: Enterococcus,    vancomycin resistant enterococci, Listeria monocytogenes,    coagulase negative staphylococci, S. aureus,    methicillin resistant S. aureus, Streptococcus agalactiae    (Group B), S. pneumoniae, S. pyogenes (Group A),    Acinetobacter baumannii, Enterobacter cloacae, E. coli,    Klebsiella oxytoca, K. pneumoniae, Proteus sp.,    Serratia marcescens, Haemophilus influenzae,    Neisseria meningitidis, Pseudomonas aeruginosa, Candida    albicans, C. glabrata, C krusei, C parapsilosis,    C. tropicalis and the KPC resistance gene.    "Due to technical problems, Proteus sp. and Pseudomonas    aeruginosa will Not be reported as part of the BCID panel    until further notice".  Organism: Blood Culture PCR (12-25-20 @ 09:40)  Organism: Blood Culture PCR (12-25-20 @ 09:40)      -  Coagulase negative Staphylococcus: Detec      Method Type: PCR    Culture - Blood (collected 12-22-20 @ 21:53)  Source: .Blood Blood-Peripheral  Final Report (12-28-20 @ 07:00):    No Growth Final    Culture - Blood (collected 12-07-20 @ 11:28)  Source: .Blood Blood-Peripheral  Final Report (12-12-20 @ 23:01):    No Growth Final            INFECTIOUS DISEASES TESTING  Procalcitonin, Serum: 6.12 (12-30-20 @ 04:30)  Procalcitonin, Serum: 0.31 (12-29-20 @ 21:31)  Procalcitonin, Serum: 0.33 (12-28-20 @ 16:00)  MRSA PCR Result.: Negative (12-28-20 @ 14:33)  Vancomycin Level, Trough: 11.4 (12-28-20 @ 10:40)  Procalcitonin, Serum: 1.09 (12-25-20 @ 06:34)  Procalcitonin, Serum: 0.31 (12-24-20 @ 22:52)  COVID-19 PCR: Detected (12-23-20 @ 04:50)  Procalcitonin, Serum: 0.84 (12-22-20 @ 21:56)  Procalcitonin, Serum: 0.10 (12-11-20 @ 08:14)  Vancomycin Level, Trough: 16.2 (12-08-20 @ 16:29)  COVID-19 PCR: Detected (12-07-20 @ 12:30)  Procalcitonin, Serum: 0.09 (12-07-20 @ 10:05)  Procalcitonin, Serum: 0.09 (11-29-20 @ 21:30)  COVID-19 PCR: NotDetec (11-29-20 @ 13:30)  Procalcitonin, Serum: 0.10 (11-29-20 @ 13:15)  Rapid RVP Result: NotDetec (10-14-20 @ 18:06)      INFLAMMATORY MARKERS  C-Reactive Protein, Serum: 9.86 mg/dL (12-30-20 @ 04:30)  C-Reactive Protein, Serum: 9.57 mg/dL (12-23-20 @ 07:33)  C-Reactive Protein, Serum: 2.39 mg/dL (12-11-20 @ 08:14)  C-Reactive Protein, Serum: 6.71 mg/dL (12-09-20 @ 05:37)      RADIOLOGY & ADDITIONAL TESTS:  I have personally reviewed the last available Chest xray  CXR  Xray Chest 1 View- PORTABLE-Urgent:   EXAM:  XR CHEST PORTABLE URGENT 1V            PROCEDURE DATE:  01/01/2021            INTERPRETATION:  Clinical History / Reason for exam: Hypoxia    Comparison : Chest radiograph December 31, 2020.    Technique/Positioning: Single AP view of the chest.    Findings:    Support devices: Endotracheal tube, feeding tube and left IJ central venous catheter are unchanged. Telemetry leads overlie chest.    Cardiac/mediastinum/hilum: Unchanged.    Lung parenchyma/Pleura: Diffuse bilateral parenchymal opacities, unchanged. No pneumothorax.    Skeleton/soft tissues: Unchanged.    Impression:    Diffuse bilateral parenchymal opacities, unchanged.    Tubes and lines positioned appropriately.              ELLIOT LANDAU MD; Attending Radiologist  This document has been electronically signed. Jan 1 2021  7:09AM (01-01-21 @ 00:21)      CT      CARDIOLOGY TESTING  12 Lead ECG:   Ventricular Rate 176 BPM    Atrial Rate 182 BPM    QRS Duration 84 ms    Q-T Interval 264 ms    QTC Calculation(Bazett) 451 ms    R Axis 25 degrees    T Axis 212 degrees    Diagnosis Line Atrial fibrillation with rapid ventricular response  Marked ST abnormality, possible inferior subendocardial injury  Abnormal ECG    Confirmed by Scott Cowan ([id number]) on 12/27/2020 12:59:53 PM (12-27-20 @ 05:38)  12 Lead ECG:   Ventricular Rate 131 BPM    Atrial Rate 156 BPM    QRS Duration 90 ms    Q-T Interval 322 ms    QTC Calculation(Bazett) 475 ms    R Axis 29 degrees    T Axis 17 degrees    Diagnosis Line Atrial fibrillation with rapid ventricular response  Nonspecific ST and T wave abnormality  Abnormal ECG    Confirmed by YANN MATTSON MD [id number]) on 12/28/2020 12:50:38 PM (12-25-20 @ 08:30)      MEDICATIONS  ascorbic acid 500 Oral daily  aspirin  chewable 81 Oral daily  atorvastatin 20 Oral at bedtime  budesonide 160 MICROgram(s)/formoterol 4.5 MICROgram(s) Inhaler 2 Inhalation two times a day  cefepime   IVPB 1000 IV Intermittent every 12 hours  chlorhexidine 0.12% Liquid 15 Oral Mucosa every 12 hours  clopidogrel Tablet 75 Oral daily  dexMEDEtomidine Infusion 0.2 IV Continuous <Continuous>  enoxaparin Injectable 80 SubCutaneous two times a day  fentaNYL   Infusion. 0.5 IV Continuous <Continuous>  gabapentin 300 Oral three times a day  lamoTRIgine 100 Oral daily  metoprolol tartrate 100 Oral every 12 hours  montelukast 10 Oral daily  multivitamin 1 Oral daily  norepinephrine Infusion 0.1 IV Continuous <Continuous>  pantoprazole    Tablet 40 Oral before breakfast  polyethylene glycol 3350 17 Oral daily  potassium phosphate IVPB 15 IV Intermittent once  propofol Infusion 10 IV Continuous <Continuous>  QUEtiapine 200 Oral at bedtime  senna 2 Oral at bedtime  sodium chloride 0.9%. 1000 IV Continuous <Continuous>  tiotropium 18 MICROgram(s) Capsule 1 Inhalation daily      WEIGHT  Weight (kg): 82.599 (12-23-20 @ 04:25)  Creatinine, Serum: 0.7 mg/dL (01-02-21 @ 04:30)      ANTIBIOTICS:  cefepime   IVPB 1000 milliGRAM(s) IV Intermittent every 12 hours      All available historical records have been reviewed

## 2021-01-02 NOTE — PROGRESS NOTE ADULT - ASSESSMENT
IMPRESSION:    Sepsis present on admission  UTI/ prior E Coli in urine  Acute hypoxemic resp failure./ severe Covid pneumonia  /SP Intubation  P A fib rapid  COPD on home oxygen  ? stroke   possible bact superinfection      PLAN:    CNS: SAT    HEENT:  Oral care    PULMONARY:  HOB @ 45 degrees, DEC FIO2 40% F/UP Plateau/ DP,  ABG, keep Sao2 92 to 96%, SBT TODAY    CARDIOVASCULAR: keep equal balance, cardio f/up,     GI: GI prophylaxis                                          Feeding Ngt NPO for weaning  RENAL:  F/u  lytes.  Correct as needed. accurate I/O    INFECTIOUS DISEASE: f/up cx, cefepime/, f/up infl markers    HEMATOLOGICAL:  DVT prophylaxis. lovenox    ENDOCRINE:  Follow up FS.  Insulin protocol if needed.    MUSCULOSKELETAL: bedrest    DISPOSITION: ED3  poor prognosis  advance directives

## 2021-01-03 LAB
ALBUMIN SERPL ELPH-MCNC: 3 G/DL — LOW (ref 3.5–5.2)
ALP SERPL-CCNC: 98 U/L — SIGNIFICANT CHANGE UP (ref 30–115)
ALT FLD-CCNC: 22 U/L — SIGNIFICANT CHANGE UP (ref 0–41)
ANION GAP SERPL CALC-SCNC: 10 MMOL/L — SIGNIFICANT CHANGE UP (ref 7–14)
AST SERPL-CCNC: 44 U/L — HIGH (ref 0–41)
BASE EXCESS BLDA CALC-SCNC: 3.2 MMOL/L — HIGH (ref -2–2)
BASE EXCESS BLDA CALC-SCNC: 3.7 MMOL/L — HIGH (ref -2–2)
BASOPHILS # BLD AUTO: 0.03 K/UL — SIGNIFICANT CHANGE UP (ref 0–0.2)
BASOPHILS NFR BLD AUTO: 0.9 % — SIGNIFICANT CHANGE UP (ref 0–1)
BILIRUB DIRECT SERPL-MCNC: <0.2 MG/DL — SIGNIFICANT CHANGE UP (ref 0–0.2)
BILIRUB INDIRECT FLD-MCNC: >0.1 MG/DL — LOW (ref 0.2–1.2)
BILIRUB SERPL-MCNC: 0.3 MG/DL — SIGNIFICANT CHANGE UP (ref 0.2–1.2)
BUN SERPL-MCNC: 17 MG/DL — SIGNIFICANT CHANGE UP (ref 10–20)
CALCIUM SERPL-MCNC: 8.5 MG/DL — SIGNIFICANT CHANGE UP (ref 8.5–10.1)
CHLORIDE SERPL-SCNC: 106 MMOL/L — SIGNIFICANT CHANGE UP (ref 98–110)
CO2 SERPL-SCNC: 25 MMOL/L — SIGNIFICANT CHANGE UP (ref 17–32)
CREAT SERPL-MCNC: 0.8 MG/DL — SIGNIFICANT CHANGE UP (ref 0.7–1.5)
EOSINOPHIL # BLD AUTO: 0.26 K/UL — SIGNIFICANT CHANGE UP (ref 0–0.7)
EOSINOPHIL NFR BLD AUTO: 8.8 % — HIGH (ref 0–8)
GAS PNL BLDA: SIGNIFICANT CHANGE UP
GAS PNL BLDA: SIGNIFICANT CHANGE UP
GIANT PLATELETS BLD QL SMEAR: PRESENT — SIGNIFICANT CHANGE UP
GLUCOSE BLDC GLUCOMTR-MCNC: 118 MG/DL — HIGH (ref 70–99)
GLUCOSE BLDC GLUCOMTR-MCNC: 135 MG/DL — HIGH (ref 70–99)
GLUCOSE BLDC GLUCOMTR-MCNC: 145 MG/DL — HIGH (ref 70–99)
GLUCOSE BLDC GLUCOMTR-MCNC: 87 MG/DL — SIGNIFICANT CHANGE UP (ref 70–99)
GLUCOSE SERPL-MCNC: 113 MG/DL — HIGH (ref 70–99)
HCO3 BLDA-SCNC: 27 MMOL/L — SIGNIFICANT CHANGE UP (ref 23–27)
HCO3 BLDA-SCNC: 28 MMOL/L — HIGH (ref 23–27)
HCT VFR BLD CALC: 28.1 % — LOW (ref 37–47)
HGB BLD-MCNC: 8.8 G/DL — LOW (ref 12–16)
HOROWITZ INDEX BLDA+IHG-RTO: 40 — SIGNIFICANT CHANGE UP
HOROWITZ INDEX BLDA+IHG-RTO: 40 — SIGNIFICANT CHANGE UP
LACTATE SERPL-SCNC: 0.8 MMOL/L — SIGNIFICANT CHANGE UP (ref 0.7–2)
LYMPHOCYTES # BLD AUTO: 0.78 K/UL — LOW (ref 1.2–3.4)
LYMPHOCYTES # BLD AUTO: 26.3 % — SIGNIFICANT CHANGE UP (ref 20.5–51.1)
MAGNESIUM SERPL-MCNC: 1.9 MG/DL — SIGNIFICANT CHANGE UP (ref 1.8–2.4)
MCHC RBC-ENTMCNC: 30.7 PG — SIGNIFICANT CHANGE UP (ref 27–31)
MCHC RBC-ENTMCNC: 31.3 G/DL — LOW (ref 32–37)
MCV RBC AUTO: 97.9 FL — SIGNIFICANT CHANGE UP (ref 81–99)
MONOCYTES # BLD AUTO: 0.23 K/UL — SIGNIFICANT CHANGE UP (ref 0.1–0.6)
MONOCYTES NFR BLD AUTO: 7.9 % — SIGNIFICANT CHANGE UP (ref 1.7–9.3)
NEUTROPHILS # BLD AUTO: 1.56 K/UL — SIGNIFICANT CHANGE UP (ref 1.4–6.5)
NEUTROPHILS NFR BLD AUTO: 52.6 % — SIGNIFICANT CHANGE UP (ref 42.2–75.2)
PCO2 BLDA: 35 MMHG — LOW (ref 38–42)
PCO2 BLDA: 42 MMHG — SIGNIFICANT CHANGE UP (ref 38–42)
PH BLDA: 7.44 — HIGH (ref 7.38–7.42)
PH BLDA: 7.48 — HIGH (ref 7.38–7.42)
PHOSPHATE SERPL-MCNC: 1.8 MG/DL — LOW (ref 2.1–4.9)
PLAT MORPH BLD: NORMAL — SIGNIFICANT CHANGE UP
PLATELET # BLD AUTO: 244 K/UL — SIGNIFICANT CHANGE UP (ref 130–400)
PO2 BLDA: 108 MMHG — HIGH (ref 78–95)
PO2 BLDA: 96 MMHG — HIGH (ref 78–95)
POLYCHROMASIA BLD QL SMEAR: SLIGHT — SIGNIFICANT CHANGE UP
POTASSIUM SERPL-MCNC: 3.8 MMOL/L — SIGNIFICANT CHANGE UP (ref 3.5–5)
POTASSIUM SERPL-SCNC: 3.8 MMOL/L — SIGNIFICANT CHANGE UP (ref 3.5–5)
PROT SERPL-MCNC: 5.6 G/DL — LOW (ref 6–8)
RBC # BLD: 2.87 M/UL — LOW (ref 4.2–5.4)
RBC # FLD: 14.5 % — SIGNIFICANT CHANGE UP (ref 11.5–14.5)
RBC BLD AUTO: NORMAL — SIGNIFICANT CHANGE UP
SAO2 % BLDA: 97 % — SIGNIFICANT CHANGE UP (ref 94–98)
SAO2 % BLDA: 99 % — HIGH (ref 94–98)
SMUDGE CELLS # BLD: PRESENT — SIGNIFICANT CHANGE UP
SODIUM SERPL-SCNC: 141 MMOL/L — SIGNIFICANT CHANGE UP (ref 135–146)
VARIANT LYMPHS # BLD: 3.5 % — SIGNIFICANT CHANGE UP (ref 0–5)
WBC # BLD: 2.97 K/UL — LOW (ref 4.8–10.8)
WBC # FLD AUTO: 2.97 K/UL — LOW (ref 4.8–10.8)

## 2021-01-03 PROCEDURE — 99233 SBSQ HOSP IP/OBS HIGH 50: CPT | Mod: CS

## 2021-01-03 PROCEDURE — 99232 SBSQ HOSP IP/OBS MODERATE 35: CPT | Mod: CS

## 2021-01-03 PROCEDURE — 71045 X-RAY EXAM CHEST 1 VIEW: CPT | Mod: 26,77

## 2021-01-03 PROCEDURE — 71045 X-RAY EXAM CHEST 1 VIEW: CPT | Mod: 26

## 2021-01-03 RX ADMIN — Medication 1 TABLET(S): at 10:10

## 2021-01-03 RX ADMIN — POLYETHYLENE GLYCOL 3350 17 GRAM(S): 17 POWDER, FOR SOLUTION ORAL at 10:11

## 2021-01-03 RX ADMIN — CEFEPIME 100 MILLIGRAM(S): 1 INJECTION, POWDER, FOR SOLUTION INTRAMUSCULAR; INTRAVENOUS at 16:30

## 2021-01-03 RX ADMIN — Medication 81 MILLIGRAM(S): at 10:09

## 2021-01-03 RX ADMIN — ENOXAPARIN SODIUM 80 MILLIGRAM(S): 100 INJECTION SUBCUTANEOUS at 16:30

## 2021-01-03 RX ADMIN — Medication 1 MILLIGRAM(S): at 12:45

## 2021-01-03 RX ADMIN — PANTOPRAZOLE SODIUM 40 MILLIGRAM(S): 20 TABLET, DELAYED RELEASE ORAL at 05:23

## 2021-01-03 RX ADMIN — GABAPENTIN 300 MILLIGRAM(S): 400 CAPSULE ORAL at 10:10

## 2021-01-03 RX ADMIN — GABAPENTIN 300 MILLIGRAM(S): 400 CAPSULE ORAL at 05:21

## 2021-01-03 RX ADMIN — DEXMEDETOMIDINE HYDROCHLORIDE IN 0.9% SODIUM CHLORIDE 4.13 MICROGRAM(S)/KG/HR: 4 INJECTION INTRAVENOUS at 05:21

## 2021-01-03 RX ADMIN — ENOXAPARIN SODIUM 80 MILLIGRAM(S): 100 INJECTION SUBCUTANEOUS at 05:23

## 2021-01-03 RX ADMIN — CLOPIDOGREL BISULFATE 75 MILLIGRAM(S): 75 TABLET, FILM COATED ORAL at 10:11

## 2021-01-03 RX ADMIN — Medication 1 MILLIGRAM(S): at 23:40

## 2021-01-03 RX ADMIN — Medication 500 MILLIGRAM(S): at 10:09

## 2021-01-03 RX ADMIN — CHLORHEXIDINE GLUCONATE 15 MILLILITER(S): 213 SOLUTION TOPICAL at 05:22

## 2021-01-03 RX ADMIN — MONTELUKAST 10 MILLIGRAM(S): 4 TABLET, CHEWABLE ORAL at 10:11

## 2021-01-03 RX ADMIN — CEFEPIME 100 MILLIGRAM(S): 1 INJECTION, POWDER, FOR SOLUTION INTRAMUSCULAR; INTRAVENOUS at 05:22

## 2021-01-03 RX ADMIN — CHLORHEXIDINE GLUCONATE 15 MILLILITER(S): 213 SOLUTION TOPICAL at 16:30

## 2021-01-03 RX ADMIN — DEXMEDETOMIDINE HYDROCHLORIDE IN 0.9% SODIUM CHLORIDE 4.13 MICROGRAM(S)/KG/HR: 4 INJECTION INTRAVENOUS at 00:57

## 2021-01-03 RX ADMIN — PROPOFOL 4.96 MICROGRAM(S)/KG/MIN: 10 INJECTION, EMULSION INTRAVENOUS at 00:56

## 2021-01-03 RX ADMIN — LAMOTRIGINE 100 MILLIGRAM(S): 25 TABLET, ORALLY DISINTEGRATING ORAL at 10:11

## 2021-01-03 RX ADMIN — Medication 15.5 MICROGRAM(S)/KG/MIN: at 00:56

## 2021-01-03 NOTE — PROGRESS NOTE ADULT - SUBJECTIVE AND OBJECTIVE BOX
SHAUN COATES  75y, Female  Allergy: codeine (Other (Moderate))  Depakote (Unknown)  Dilaudid (Short breath; Rash)  IV Contrast (Anaphylaxis)  losartan (Angioedema)  Risperdal (Other)  verapamil (Short breath; Angioedema)      LOS  11d    CHIEF COMPLAINT: chest pain weakness (03 Jan 2021 10:32)      INTERVAL EVENTS/HPI  - vented 40%  - T(F): , Max: 99.3 (01-02-21 @ 19:00)  - WBC Count: 2.97 (01-03-21 @ 05:21)  WBC Count: 2.35 (01-02-21 @ 04:30)  - Creatinine, Serum: 0.8 (01-03-21 @ 05:21)  Creatinine, Serum: 0.7 (01-02-21 @ 04:30)           VITALS:  T(F): 98.7, Max: 99.3 (01-02-21 @ 19:00)  HR: 72  BP: 144/54  RR: 29Vital Signs Last 24 Hrs  T(C): 37.1 (03 Jan 2021 08:00), Max: 37.4 (02 Jan 2021 19:00)  T(F): 98.7 (03 Jan 2021 08:00), Max: 99.3 (02 Jan 2021 19:00)  HR: 72 (03 Jan 2021 09:00) (66 - 96)  BP: 144/54 (02 Jan 2021 18:40) (107/52 - 144/54)  BP(mean): --  RR: 29 (03 Jan 2021 09:00) (16 - 62)  SpO2: 100% (03 Jan 2021 09:00) (98% - 100%)    FH: Non-contributory  Social Hx: Non-contributory    TESTS & MEASUREMENTS:                        8.8    2.97  )-----------( 244      ( 03 Jan 2021 05:21 )             28.1     01-03    141  |  106  |  17  ----------------------------<  113<H>  3.8   |  25  |  0.8    Ca    8.5      03 Jan 2021 05:21  Phos  1.8     01-03  Mg     1.9     01-03    TPro  5.6<L>  /  Alb  3.0<L>  /  TBili  0.3  /  DBili  <0.2  /  AST  44<H>  /  ALT  22  /  AlkPhos  98  01-03    eGFR if Non African American: 73 mL/min/1.73M2 (01-03-21 @ 05:21)  eGFR if African American: 85 mL/min/1.73M2 (01-03-21 @ 05:21)    LIVER FUNCTIONS - ( 03 Jan 2021 05:21 )  Alb: 3.0 g/dL / Pro: 5.6 g/dL / ALK PHOS: 98 U/L / ALT: 22 U/L / AST: 44 U/L / GGT: x               Culture - Blood (collected 01-01-21 @ 07:24)  Source: .Blood None  Preliminary Report (01-02-21 @ 12:01):    No growth to date.    Culture - Blood (collected 12-31-20 @ 11:42)  Source: .Blood None  Preliminary Report (01-01-21 @ 22:02):    No growth to date.    Culture - Sputum (collected 12-30-20 @ 12:00)  Source: .Sputum Deep Tracheal Aspirate  Gram Stain (12-31-20 @ 12:46):    Few polymorphonuclear leukocytes per low power field    Few Squamous epithelial cells per low power field    Few Gram Positive Cocci in Pairs and Chains per oil power field  Final Report (01-02-21 @ 11:26):    Normal Respiratory Beronica present    Culture - Blood (collected 12-29-20 @ 16:45)  Source: .Blood None  Preliminary Report (12-31-20 @ 01:02):    No growth to date.    Culture - Urine (collected 12-29-20 @ 15:21)  Source: .Urine Clean Catch (Midstream)  Final Report (12-30-20 @ 20:53):    No growth    Culture - Blood (collected 12-25-20 @ 11:27)  Source: .Blood None  Final Report (12-30-20 @ 16:01):    No Growth Final    Culture - Blood (collected 12-24-20 @ 22:53)  Source: .Blood Blood-Venous  Final Report (12-30-20 @ 13:01):    No Growth Final    Culture - Urine (collected 12-23-20 @ 00:07)  Source: .Urine Clean Catch (Midstream)  Final Report (12-24-20 @ 02:06):    <10,000 CFU/mL Normal Urogenital Beronica    Culture - Blood (collected 12-22-20 @ 21:53)  Source: .Blood Blood-Peripheral  Gram Stain (12-24-20 @ 03:50):    Growth in aerobic bottle: Gram Positive Cocci in Clusters  Final Report (12-25-20 @ 09:40):    Growth in anaerobic bottle: Staphylococcus capitis    Coag Negative Staphylococcus    Single set isolate, possible contaminant. Contact    Microbiology if susceptibility testing clinically    indicated.    ***Blood Panel PCR results on this specimen are available    approximately 3 hours after the Gram stain result.***    Gram stain, PCR, and/or culture results may not always    correspond due to difference in methodologies.    ************************************************************    This PCR assay was performed using [facility].    The following targets are tested for: Enterococcus,    vancomycin resistant enterococci, Listeria monocytogenes,    coagulase negative staphylococci, S. aureus,    methicillin resistant S. aureus, Streptococcus agalactiae    (Group B), S. pneumoniae, S. pyogenes (Group A),    Acinetobacter baumannii, Enterobacter cloacae, E. coli,    Klebsiella oxytoca, K. pneumoniae, Proteus sp.,    Serratia marcescens, Haemophilus influenzae,    Neisseria meningitidis, Pseudomonas aeruginosa, Candida    albicans, C. glabrata, C krusei, C parapsilosis,    C. tropicalis and the KPC resistance gene.    "Due to technical problems, Proteus sp. and Pseudomonas    aeruginosa will Not be reported as part of the BCID panel    until further notice".  Organism: Blood Culture PCR (12-25-20 @ 09:40)  Organism: Blood Culture PCR (12-25-20 @ 09:40)      -  Coagulase negative Staphylococcus: Detec      Method Type: PCR    Culture - Blood (collected 12-22-20 @ 21:53)  Source: .Blood Blood-Peripheral  Final Report (12-28-20 @ 07:00):    No Growth Final    Culture - Blood (collected 12-07-20 @ 11:28)  Source: .Blood Blood-Peripheral  Final Report (12-12-20 @ 23:01):    No Growth Final            INFECTIOUS DISEASES TESTING  Procalcitonin, Serum: 6.12 (12-30-20 @ 04:30)  Procalcitonin, Serum: 0.31 (12-29-20 @ 21:31)  Procalcitonin, Serum: 0.33 (12-28-20 @ 16:00)  MRSA PCR Result.: Negative (12-28-20 @ 14:33)  Vancomycin Level, Trough: 11.4 (12-28-20 @ 10:40)  Procalcitonin, Serum: 1.09 (12-25-20 @ 06:34)  Procalcitonin, Serum: 0.31 (12-24-20 @ 22:52)  COVID-19 PCR: Detected (12-23-20 @ 04:50)  Procalcitonin, Serum: 0.84 (12-22-20 @ 21:56)  Procalcitonin, Serum: 0.10 (12-11-20 @ 08:14)  Vancomycin Level, Trough: 16.2 (12-08-20 @ 16:29)  COVID-19 PCR: Detected (12-07-20 @ 12:30)  Procalcitonin, Serum: 0.09 (12-07-20 @ 10:05)  Procalcitonin, Serum: 0.09 (11-29-20 @ 21:30)  COVID-19 PCR: NotDetec (11-29-20 @ 13:30)  Procalcitonin, Serum: 0.10 (11-29-20 @ 13:15)  Rapid RVP Result: NotDetec (10-14-20 @ 18:06)      INFLAMMATORY MARKERS  C-Reactive Protein, Serum: 9.86 mg/dL (12-30-20 @ 04:30)  C-Reactive Protein, Serum: 9.57 mg/dL (12-23-20 @ 07:33)  C-Reactive Protein, Serum: 2.39 mg/dL (12-11-20 @ 08:14)  C-Reactive Protein, Serum: 6.71 mg/dL (12-09-20 @ 05:37)      RADIOLOGY & ADDITIONAL TESTS:  I have personally reviewed the last available Chest xray  CXR  Xray Chest 1 View- PORTABLE-Urgent:   EXAM:  XR CHEST PORTABLE URGENT 1V            PROCEDURE DATE:  01/01/2021            INTERPRETATION:  Clinical History / Reason for exam: Hypoxia    Comparison : Chest radiograph December 31, 2020.    Technique/Positioning: Single AP view of the chest.    Findings:    Support devices: Endotracheal tube, feeding tube and left IJ central venous catheter are unchanged. Telemetry leads overlie chest.    Cardiac/mediastinum/hilum: Unchanged.    Lung parenchyma/Pleura: Diffuse bilateral parenchymal opacities, unchanged. No pneumothorax.    Skeleton/soft tissues: Unchanged.    Impression:    Diffuse bilateral parenchymal opacities, unchanged.    Tubes and lines positioned appropriately.              ELLIOT LANDAU MD; Attending Radiologist  This document has been electronically signed. Jan 1 2021  7:09AM (01-01-21 @ 00:21)      CT      CARDIOLOGY TESTING  12 Lead ECG:   Ventricular Rate 176 BPM    Atrial Rate 182 BPM    QRS Duration 84 ms    Q-T Interval 264 ms    QTC Calculation(Bazett) 451 ms    R Axis 25 degrees    T Axis 212 degrees    Diagnosis Line Atrial fibrillation with rapid ventricular response  Marked ST abnormality, possible inferior subendocardial injury  Abnormal ECG    Confirmed by Scott Cowan ([id number]) on 12/27/2020 12:59:53 PM (12-27-20 @ 05:38)  12 Lead ECG:   Ventricular Rate 131 BPM    Atrial Rate 156 BPM    QRS Duration 90 ms    Q-T Interval 322 ms    QTC Calculation(Bazett) 475 ms    R Axis 29 degrees    T Axis 17 degrees    Diagnosis Line Atrial fibrillation with rapid ventricular response  Nonspecific ST and T wave abnormality  Abnormal ECG    Confirmed by YANN MATTSON MD ([id number]) on 12/28/2020 12:50:38 PM (12-25-20 @ 08:30)      MEDICATIONS  ascorbic acid 500 Oral daily  aspirin  chewable 81 Oral daily  atorvastatin 20 Oral at bedtime  budesonide 160 MICROgram(s)/formoterol 4.5 MICROgram(s) Inhaler 2 Inhalation two times a day  cefepime   IVPB 1000 IV Intermittent every 12 hours  chlorhexidine 0.12% Liquid 15 Oral Mucosa every 12 hours  clopidogrel Tablet 75 Oral daily  dexMEDEtomidine Infusion 0.2 IV Continuous <Continuous>  enoxaparin Injectable 80 SubCutaneous two times a day  fentaNYL   Infusion. 0.5 IV Continuous <Continuous>  gabapentin 300 Oral three times a day  lamoTRIgine 100 Oral daily  metoprolol tartrate 100 Oral every 12 hours  montelukast 10 Oral daily  multivitamin 1 Oral daily  norepinephrine Infusion 0.1 IV Continuous <Continuous>  pantoprazole    Tablet 40 Oral before breakfast  polyethylene glycol 3350 17 Oral daily  propofol Infusion 10 IV Continuous <Continuous>  QUEtiapine 200 Oral at bedtime  senna 2 Oral at bedtime  sodium chloride 0.9%. 1000 IV Continuous <Continuous>  tiotropium 18 MICROgram(s) Capsule 1 Inhalation daily      WEIGHT  Weight (kg): 82.599 (12-23-20 @ 04:25)  Creatinine, Serum: 0.8 mg/dL (01-03-21 @ 05:21)      ANTIBIOTICS:  cefepime   IVPB 1000 milliGRAM(s) IV Intermittent every 12 hours      All available historical records have been reviewed

## 2021-01-03 NOTE — PROGRESS NOTE ADULT - ASSESSMENT
IMPRESSION:    Sepsis present on admission  UTI/ prior E Coli in urine  Acute hypoxemic resp failure./ severe Covid pneumonia  /SP Intubation  P A fib rapid  COPD on home oxygen  ? stroke   possible bact superinfection      PLAN:    CNS: SAT    HEENT:  Oral care    PULMONARY:  HOB @ 45 degrees, DEC FIO2 30% F/UP Plateau/ DP,  ABG, keep Sao2 92 to 96%, SBT , might need trach very weak    CARDIOVASCULAR: keep equal balance, cardio f/up,     GI: GI prophylaxis                                          Feeding Ngt  RENAL:  F/u  lytes.  Correct as needed. accurate I/O    INFECTIOUS DISEASE: f/up cx, cefepime/, f/up infl markers    HEMATOLOGICAL:  DVT prophylaxis. lovenox    ENDOCRINE:  Follow up FS.  Insulin protocol if needed.    MUSCULOSKELETAL: bedrest    DISPOSITION: ED3  poor prognosis  advance directives

## 2021-01-03 NOTE — CHART NOTE - NSCHARTNOTEFT_GEN_A_CORE
Patient self extubated today around 12:15PM. Precedex was immediately d/c, patient was anxious, given 1mg ativan. Patient was sating well on venti-mask, but is in moderate resp distress 2/2 tachypnea. Attempted to slow resp rate with ativan, will monitor patient closely Patient self extubated today around 12:15PM. Precedex was immediately d/c, patient was anxious, given 1mg ativan. Patient was sating well on venti-mask, but is in moderate resp distress 2/2 tachypnea. Attempted to slow resp rate with ativan, will monitor patient closely    Updated daughter Quin [phone]

## 2021-01-03 NOTE — PROGRESS NOTE ADULT - SUBJECTIVE AND OBJECTIVE BOX
Pt seen and examined at bedside. Pt self extubated today. Stable post extubation. Mentating well.     PAST MEDICAL & SURGICAL HISTORY:  Falls    Congestive heart failure  Transient ischemic attack  FLAVIO on CPAP  Bipolar 1 disorder  Allergic reaction  PVD (peripheral vascular disease)  Other emphysema  Other cardiomyopathy  TIA (transient ischemic attack)  COPD (chronic obstructive pulmonary disease)  Depression  Hypertension  History of cholecystectomy      VITAL SIGNS (Last 24 hrs):  T(C): 36.3 (01-03-21 @ 17:01), Max: 37.4 (01-02-21 @ 19:00)  HR: 97 (01-03-21 @ 17:01) (65 - 97)  BP: 144/54 (01-02-21 @ 18:40) (144/54 - 144/54)  RR: 20 (01-03-21 @ 17:01) (16 - 62)  SpO2: 92% (01-03-21 @ 17:01) (91% - 100%)  Wt(kg): --  Daily     Daily     I&O's Summary    02 Jan 2021 07:01  -  03 Jan 2021 07:00  --------------------------------------------------------  IN: 1849.5 mL / OUT: 3000 mL / NET: -1150.5 mL    03 Jan 2021 07:01  -  03 Jan 2021 17:47  --------------------------------------------------------  IN: 425 mL / OUT: 275 mL / NET: 150 mL        PHYSICAL EXAM:  GENERAL: NAD, on vent mask   HEAD:  Atraumatic, Normocephalic  EYES: EOMI, PERRLA, conjunctiva and sclera clear  NECK: Supple, No JVD  CHEST/LUNG: Clear to auscultation bilaterally; No wheeze  HEART: Regular rate and rhythm; No murmurs, rubs, or gallops  ABDOMEN: Soft, Nontender, Nondistended; Bowel sounds present  EXTREMITIES:  2+ Peripheral Pulses, No clubbing, cyanosis, or edema  PSYCH: alert and responds   NEUROLOGY: non-focal  SKIN: No rashes or lesions    Labs Reviewed  Spoke to patient in regards to abnormal labs.    CBC Full  -  ( 03 Jan 2021 05:21 )  WBC Count : 2.97 K/uL  Hemoglobin : 8.8 g/dL  Hematocrit : 28.1 %  Platelet Count - Automated : 244 K/uL  Mean Cell Volume : 97.9 fL  Mean Cell Hemoglobin : 30.7 pg  Mean Cell Hemoglobin Concentration : 31.3 g/dL  Auto Neutrophil # : 1.56 K/uL  Auto Lymphocyte # : 0.78 K/uL  Auto Monocyte # : 0.23 K/uL  Auto Eosinophil # : 0.26 K/uL  Auto Basophil # : 0.03 K/uL  Auto Neutrophil % : 52.6 %  Auto Lymphocyte % : 26.3 %  Auto Monocyte % : 7.9 %  Auto Eosinophil % : 8.8 %  Auto Basophil % : 0.9 %    BMP:    01-03 @ 05:21    Blood Urea Nitrogen - 17  Calcium - 8.5  Carbond Dioxide - 25  Chloride - 106  Creatinine - 0.8  Glucose - 113  Potassium - 3.8  Sodium - 141      Hemoglobin A1c -     Urine Culture:  01-01 @ 07:24 Urine culture: --    Culture Results:   No growth to date.  Method Type: --  Organism: --  Organism Identification: --  Specimen Source: .Blood None  12-31 @ 11:42 Urine culture: --    Culture Results:   No growth to date.  Method Type: --  Organism: --  Organism Identification: --  Specimen Source: .Blood None  12-30 @ 12:00 Urine culture: --    Culture Results:   Normal Respiratory Beronica present  Method Type: --  Organism: --  Organism Identification: --  Specimen Source: .Sputum Deep Tracheal Aspirate        COVID Labs  C-Reactive Protein, Serum: 9.86 mg/dL (12-30-20 @ 04:30)    Procalcitonin, Serum: 6.12 ng/mL (12-30-20 @ 04:30)  Procalcitonin, Serum: 0.31 ng/mL (12-29-20 @ 21:31)  Procalcitonin, Serum: 0.33 ng/mL (12-28-20 @ 16:00)  Procalcitonin, Serum: 1.09 ng/mL (12-25-20 @ 06:34)  Procalcitonin, Serum: 0.31 ng/mL (12-24-20 @ 22:52)  Procalcitonin, Serum: 0.84 ng/mL (12-22-20 @ 21:56)    D-Dimer:  2769 ng/mL DDU (12-30-20 @ 04:30)  3285 ng/mL DDU (12-29-20 @ 17:07)    Ferritin, Serum: 440 ng/mL (12-30-20 @ 04:30)  Ferritin, Serum: 497 ng/mL (12-29-20 @ 17:07)  Ferritin, Serum: 997 ng/mL (12-28-20 @ 16:00)  Ferritin, Serum: 910 ng/mL (12-23-20 @ 07:33)      Imaging reviewed:   < from: Xray Chest 1 View-PORTABLE IMMEDIATE (Xray Chest 1 View-PORTABLE IMMEDIATE .) (01.03.21 @ 14:15) >  Impression:    Interval removal of NG tube and ET tube    Unchanged diffuse bilateral parenchymal opacities.    Stable left IJ    < end of copied text >      MEDICATIONS  (STANDING):  ascorbic acid 500 milliGRAM(s) Oral daily  aspirin  chewable 81 milliGRAM(s) Oral daily  atorvastatin 20 milliGRAM(s) Oral at bedtime  budesonide 160 MICROgram(s)/formoterol 4.5 MICROgram(s) Inhaler 2 Puff(s) Inhalation two times a day  chlorhexidine 0.12% Liquid 15 milliLiter(s) Oral Mucosa every 12 hours  clopidogrel Tablet 75 milliGRAM(s) Oral daily  dexMEDEtomidine Infusion 0.2 MICROgram(s)/kG/Hr (4.13 mL/Hr) IV Continuous <Continuous>  enoxaparin Injectable 80 milliGRAM(s) SubCutaneous two times a day  fentaNYL   Infusion. 0.5 MICROgram(s)/kG/Hr (4.13 mL/Hr) IV Continuous <Continuous>  gabapentin 300 milliGRAM(s) Oral three times a day  lamoTRIgine 100 milliGRAM(s) Oral daily  metoprolol tartrate 100 milliGRAM(s) Oral every 12 hours  montelukast 10 milliGRAM(s) Oral daily  multivitamin 1 Tablet(s) Oral daily  norepinephrine Infusion 0.1 MICROgram(s)/kG/Min (15.5 mL/Hr) IV Continuous <Continuous>  pantoprazole    Tablet 40 milliGRAM(s) Oral before breakfast  polyethylene glycol 3350 17 Gram(s) Oral daily  propofol Infusion 10 MICROgram(s)/kG/Min (4.96 mL/Hr) IV Continuous <Continuous>  QUEtiapine 200 milliGRAM(s) Oral at bedtime  senna 2 Tablet(s) Oral at bedtime  sodium chloride 0.9%. 1000 milliLiter(s) (75 mL/Hr) IV Continuous <Continuous>  tiotropium 18 MICROgram(s) Capsule 1 Capsule(s) Inhalation daily    MEDICATIONS  (PRN):  acetaminophen  Suppository .. 650 milliGRAM(s) Rectal every 6 hours PRN Temp greater or equal to 38C (100.4F)  ALBUTerol    90 MICROgram(s) HFA Inhaler 1 Puff(s) Inhalation every 4 hours PRN Shortness of Breath  aluminum hydroxide/magnesium hydroxide/simethicone Suspension 30 milliLiter(s) Oral every 4 hours PRN Dyspepsia  guaifenesin/dextromethorphan  Syrup 10 milliLiter(s) Oral every 6 hours PRN Cough

## 2021-01-03 NOTE — PROGRESS NOTE ADULT - ASSESSMENT
76yo woman with medical history of COPD on 5L home oxygen, paroxysmal afib on pradaxa, carotid stenosis (L 80%, R 40%) s/p L carotid stenting recently , HFpEF (EF 70% Dec 2020), HTN, HLD, bipolar disorder, left parotid mass, recently admitted between 12/7-12/11 for COVID PNA s/p steroids/RDV presented to the ED with chest pain.     # Acute Chronic respiratory failure 2/2 likely due to COVID PNA with septic Shock s/p self extubation today.  # Chest pain- likely pleuritic from COVID  requiring pressors initially now off  CXR unchanged today   Repeating inflammatory markers this for 1/1/2020 AM   ABG this AM-->7.33/44/90/Arterial Sat 98%-->Keep FiO2 45, , RR 22, PEEP 8; Lip Line on ETT Adjusted from 21 to 24.   1g q12 Vancomycin d/c'd as MRSA PCR -ve; cefepime 1g q12 started;   BCx 12/29/2020 negative  BCx for 12/31/2020 as patient was febrile overnight   ECHO (12/2/2020): Hyperdynamic global left ventricular systolic function; EF of 70 %; Moderately increased LV wall thickness. Grade I diastolic dysfunction.  DTA pending   s/p Toci 400mg 12/29/2020  Patient was previously treated for COVID starting 12/7/2020 w/steroids, RDV.   tylenol for fevers   pt stable, abg stable  Pt self extubated. Now on venturi mask sating 92s  Tappering off levo   SS eval, incentive spirometer     #Paroxysmal atrial fibrillation w/ RVR   switched Pradaxa 150mg PO BID, to lovenox 80 mg BID  for now  ASA 81 mg  and Plavix recently added after a recent TIA last month.   c/w Metoprolol for rate control    #JOYCE likely pre renal - resolved  baseline: 0.8-1.1  SCr .7 today  K+ 4.6    # Left ICA s/p TCAR (transcarotid artery revascularization) 12/04 with TIA last admission  c/w plavix, pradaxa, ASA  Code stroke on 12/05 for aphasia - workup was negative for acute stroke    #Left intra parotid mass   needs outpt biopsy     # Chronic HFpEF  # HTN  #HLD  /65  Preserved EF according to Dr. Bishop's notes, although no echo report in [place].   ECHO (12/2/2020): Hyperdynamic global left ventricular systolic function; EF of 70 %; Moderately increased LV wall thickness. Grade I diastolic dysfunction.  c/w Atorvastatin  c/w Metoprolol -->Hold if SBP<100    #Bipolar Disorder  Mood stable  Continue home meds Lamictal 100mg PO daily, Seroquel 200mg at bedtime, and Ativan 0.5mg PO twice daily PRN    #Progress Note Handoff  Pending (specify): pending improvment, post extubation   Family discussion: called daughter amara and updated.   Disposition: Home___/SNF___/Other___/Unknown at this time__x_  Pt seen during COVID 19 Pandemic.

## 2021-01-03 NOTE — PROGRESS NOTE ADULT - NSREFPHYEXINPTDOCREFER_GEN_ALL_CORE
my previous exam; resident/attending AM exam

## 2021-01-03 NOTE — PROGRESS NOTE ADULT - ASSESSMENT
ASSESSMENT  75y F with COPD on 5L home O2, paroxysmal afib, carotid stenosis, HFpEF, HTN, HLD, Bipolar disorder, recent admission D/C 12/11 s/p left TCAR, right groin access  found to be COVID-19 PNA s/p RDV, plasma now admitted with CP     IMPRESSION  #Severe COVID-19 PNA with septic shock requiring pressors     12/30 tracheal aspirate    Procalcitonin, Serum: 0.84 ng/mL (12-22-20 @ 21:56) --> Procalcitonin, Serum: 6.12 (12-30-20 @ 04:30)    CXR bilateral opacities , RLL opacity    UA unremarkable UCX NG    COVID-19 PCR: Detected (12-07-20 @ 12:30)   D-Dimer Assay, Quantitative: 2769 ng/mL DDU (12-30-20 @ 04:30)  D-Dimer Assay, Quantitative: 3285 ng/mL DDU (12-29-20 @ 17:07)  Ferritin, Serum: 997 ng/mL (12-28-20 @ 16:00)  #CoNS bacteremia , contaminant     Repeat BCX NG  #JOYCE  #Lactic acidosis  #Obesity BMI (kg/m2): 32.3, 34      RECOMMENDATIONS  - D12 of cefepime, recommend D/C  - s/p 12/30 Tocilizumab 400mg x1      If any questions, please call or send a message on [employer]  Spectra [phone]

## 2021-01-03 NOTE — PROGRESS NOTE ADULT - SUBJECTIVE AND OBJECTIVE BOX
OVERNIGHT EVENTS: events noted, still intubated, ventilated, on propofol, fentanyl, levophed, failed SBT    Vital Signs Last 24 Hrs  T(C): 37.1 (03 Jan 2021 08:00), Max: 37.4 (02 Jan 2021 19:00)  T(F): 98.7 (03 Jan 2021 08:00), Max: 99.3 (02 Jan 2021 19:00)  HR: 72 (03 Jan 2021 09:00) (66 - 96)  BP: 144/54 (02 Jan 2021 18:40) (107/52 - 144/54)  RR: 29 (03 Jan 2021 09:00) (16 - 62)  SpO2: 100% (03 Jan 2021 09:00) (98% - 100%)    PHYSICAL EXAMINATION:    GENERAL: ILL looking    HEENT: Head is normocephalic and atraumatic. intubated    NECK: Supple.    LUNGS: brennan 3/6    HEART: Regular rate and rhythm without murmur.    ABDOMEN: Soft, nontender, and nondistended.      EXTREMITIES: Without any cyanosis, clubbing, rash, lesions or edema.    NEUROLOGIC: Grossly intact.    SKIN: No ulceration or induration present.      LABS:                        8.8    2.97  )-----------( 244      ( 03 Jan 2021 05:21 )             28.1     01-03    141  |  106  |  17  ----------------------------<  113<H>  3.8   |  25  |  0.8    Ca    8.5      03 Jan 2021 05:21  Phos  1.8     01-03  Mg     1.9     01-03    TPro  5.6<L>  /  Alb  3.0<L>  /  TBili  0.3  /  DBili  <0.2  /  AST  44<H>  /  ALT  22  /  AlkPhos  98  01-03        ABG - ( 03 Jan 2021 09:58 )  pH, Arterial: 7.44  pH, Blood: x     /  pCO2: 42    /  pO2: 96    / HCO3: 28    / Base Excess: 3.7   /  SaO2: 97        400/26/30/8                        01-02-21 @ 07:01  -  01-03-21 @ 07:00  --------------------------------------------------------  IN: 1849.5 mL / OUT: 3000 mL / NET: -1150.5 mL        MICROBIOLOGY:  Culture Results:   No growth to date. (01-01 @ 07:24)  Culture Results:   No growth to date. (12-31 @ 11:42)  Culture Results:   Normal Respiratory Beronica present (12-30 @ 12:00)      MEDICATIONS  (STANDING):  ascorbic acid 500 milliGRAM(s) Oral daily  aspirin  chewable 81 milliGRAM(s) Oral daily  atorvastatin 20 milliGRAM(s) Oral at bedtime  budesonide 160 MICROgram(s)/formoterol 4.5 MICROgram(s) Inhaler 2 Puff(s) Inhalation two times a day  cefepime   IVPB 1000 milliGRAM(s) IV Intermittent every 12 hours  chlorhexidine 0.12% Liquid 15 milliLiter(s) Oral Mucosa every 12 hours  clopidogrel Tablet 75 milliGRAM(s) Oral daily  dexMEDEtomidine Infusion 0.2 MICROgram(s)/kG/Hr (4.13 mL/Hr) IV Continuous <Continuous>  enoxaparin Injectable 80 milliGRAM(s) SubCutaneous two times a day  fentaNYL   Infusion. 0.5 MICROgram(s)/kG/Hr (4.13 mL/Hr) IV Continuous <Continuous>  gabapentin 300 milliGRAM(s) Oral three times a day  lamoTRIgine 100 milliGRAM(s) Oral daily  metoprolol tartrate 100 milliGRAM(s) Oral every 12 hours  montelukast 10 milliGRAM(s) Oral daily  multivitamin 1 Tablet(s) Oral daily  norepinephrine Infusion 0.1 MICROgram(s)/kG/Min (15.5 mL/Hr) IV Continuous <Continuous>  pantoprazole    Tablet 40 milliGRAM(s) Oral before breakfast  polyethylene glycol 3350 17 Gram(s) Oral daily  propofol Infusion 10 MICROgram(s)/kG/Min (4.96 mL/Hr) IV Continuous <Continuous>  QUEtiapine 200 milliGRAM(s) Oral at bedtime  senna 2 Tablet(s) Oral at bedtime  sodium chloride 0.9%. 1000 milliLiter(s) (75 mL/Hr) IV Continuous <Continuous>  tiotropium 18 MICROgram(s) Capsule 1 Capsule(s) Inhalation daily    MEDICATIONS  (PRN):  acetaminophen  Suppository .. 650 milliGRAM(s) Rectal every 6 hours PRN Temp greater or equal to 38C (100.4F)  ALBUTerol    90 MICROgram(s) HFA Inhaler 1 Puff(s) Inhalation every 4 hours PRN Shortness of Breath  aluminum hydroxide/magnesium hydroxide/simethicone Suspension 30 milliLiter(s) Oral every 4 hours PRN Dyspepsia  guaifenesin/dextromethorphan  Syrup 10 milliLiter(s) Oral every 6 hours PRN Cough      RADIOLOGY & ADDITIONAL STUDIES:

## 2021-01-04 LAB
ALBUMIN SERPL ELPH-MCNC: 2.9 G/DL — LOW (ref 3.5–5.2)
ALBUMIN SERPL ELPH-MCNC: 2.9 G/DL — LOW (ref 3.5–5.2)
ALP SERPL-CCNC: 93 U/L — SIGNIFICANT CHANGE UP (ref 30–115)
ALP SERPL-CCNC: 93 U/L — SIGNIFICANT CHANGE UP (ref 30–115)
ALT FLD-CCNC: 39 U/L — SIGNIFICANT CHANGE UP (ref 0–41)
ALT FLD-CCNC: 39 U/L — SIGNIFICANT CHANGE UP (ref 0–41)
ANION GAP SERPL CALC-SCNC: 8 MMOL/L — SIGNIFICANT CHANGE UP (ref 7–14)
AST SERPL-CCNC: 94 U/L — HIGH (ref 0–41)
AST SERPL-CCNC: 94 U/L — HIGH (ref 0–41)
BASE EXCESS BLDA CALC-SCNC: 3.1 MMOL/L — HIGH (ref -2–2)
BASOPHILS # BLD AUTO: 0.02 K/UL — SIGNIFICANT CHANGE UP (ref 0–0.2)
BASOPHILS NFR BLD AUTO: 0.6 % — SIGNIFICANT CHANGE UP (ref 0–1)
BILIRUB DIRECT SERPL-MCNC: <0.2 MG/DL — SIGNIFICANT CHANGE UP (ref 0–0.2)
BILIRUB INDIRECT FLD-MCNC: >0.2 MG/DL — SIGNIFICANT CHANGE UP (ref 0.2–1.2)
BILIRUB SERPL-MCNC: 0.4 MG/DL — SIGNIFICANT CHANGE UP (ref 0.2–1.2)
BILIRUB SERPL-MCNC: 0.4 MG/DL — SIGNIFICANT CHANGE UP (ref 0.2–1.2)
BUN SERPL-MCNC: 18 MG/DL — SIGNIFICANT CHANGE UP (ref 10–20)
CALCIUM SERPL-MCNC: 8.5 MG/DL — SIGNIFICANT CHANGE UP (ref 8.5–10.1)
CHLORIDE SERPL-SCNC: 110 MMOL/L — SIGNIFICANT CHANGE UP (ref 98–110)
CO2 SERPL-SCNC: 27 MMOL/L — SIGNIFICANT CHANGE UP (ref 17–32)
CREAT SERPL-MCNC: 0.7 MG/DL — SIGNIFICANT CHANGE UP (ref 0.7–1.5)
CULTURE RESULTS: SIGNIFICANT CHANGE UP
EOSINOPHIL # BLD AUTO: 0.23 K/UL — SIGNIFICANT CHANGE UP (ref 0–0.7)
EOSINOPHIL NFR BLD AUTO: 6.6 % — SIGNIFICANT CHANGE UP (ref 0–8)
GAS PNL BLDA: SIGNIFICANT CHANGE UP
GLUCOSE BLDC GLUCOMTR-MCNC: 109 MG/DL — HIGH (ref 70–99)
GLUCOSE BLDC GLUCOMTR-MCNC: 133 MG/DL — HIGH (ref 70–99)
GLUCOSE BLDC GLUCOMTR-MCNC: 74 MG/DL — SIGNIFICANT CHANGE UP (ref 70–99)
GLUCOSE BLDC GLUCOMTR-MCNC: 95 MG/DL — SIGNIFICANT CHANGE UP (ref 70–99)
GLUCOSE BLDC GLUCOMTR-MCNC: 96 MG/DL — SIGNIFICANT CHANGE UP (ref 70–99)
GLUCOSE SERPL-MCNC: 90 MG/DL — SIGNIFICANT CHANGE UP (ref 70–99)
HCO3 BLDA-SCNC: 29 MMOL/L — HIGH (ref 23–27)
HCT VFR BLD CALC: 26.9 % — LOW (ref 37–47)
HGB BLD-MCNC: 8.1 G/DL — LOW (ref 12–16)
HOROWITZ INDEX BLDA+IHG-RTO: 100 — SIGNIFICANT CHANGE UP
IMM GRANULOCYTES NFR BLD AUTO: 0.6 % — HIGH (ref 0.1–0.3)
LYMPHOCYTES # BLD AUTO: 1.01 K/UL — LOW (ref 1.2–3.4)
LYMPHOCYTES # BLD AUTO: 29 % — SIGNIFICANT CHANGE UP (ref 20.5–51.1)
MAGNESIUM SERPL-MCNC: 1.9 MG/DL — SIGNIFICANT CHANGE UP (ref 1.8–2.4)
MCHC RBC-ENTMCNC: 30.1 G/DL — LOW (ref 32–37)
MCHC RBC-ENTMCNC: 30.5 PG — SIGNIFICANT CHANGE UP (ref 27–31)
MCV RBC AUTO: 101.1 FL — HIGH (ref 81–99)
MONOCYTES # BLD AUTO: 0.22 K/UL — SIGNIFICANT CHANGE UP (ref 0.1–0.6)
MONOCYTES NFR BLD AUTO: 6.3 % — SIGNIFICANT CHANGE UP (ref 1.7–9.3)
NEUTROPHILS # BLD AUTO: 1.98 K/UL — SIGNIFICANT CHANGE UP (ref 1.4–6.5)
NEUTROPHILS NFR BLD AUTO: 56.9 % — SIGNIFICANT CHANGE UP (ref 42.2–75.2)
NRBC # BLD: 0 /100 WBCS — SIGNIFICANT CHANGE UP (ref 0–0)
PCO2 BLDA: 52 MMHG — HIGH (ref 38–42)
PH BLDA: 7.36 — LOW (ref 7.38–7.42)
PHOSPHATE SERPL-MCNC: 2.8 MG/DL — SIGNIFICANT CHANGE UP (ref 2.1–4.9)
PLATELET # BLD AUTO: 227 K/UL — SIGNIFICANT CHANGE UP (ref 130–400)
PO2 BLDA: 134 MMHG — HIGH (ref 78–95)
POTASSIUM SERPL-MCNC: 3.5 MMOL/L — SIGNIFICANT CHANGE UP (ref 3.5–5)
POTASSIUM SERPL-SCNC: 3.5 MMOL/L — SIGNIFICANT CHANGE UP (ref 3.5–5)
PROT SERPL-MCNC: 5.2 G/DL — LOW (ref 6–8)
PROT SERPL-MCNC: 5.2 G/DL — LOW (ref 6–8)
RBC # BLD: 2.66 M/UL — LOW (ref 4.2–5.4)
RBC # FLD: 15.1 % — HIGH (ref 11.5–14.5)
SAO2 % BLDA: 99 % — HIGH (ref 94–98)
SODIUM SERPL-SCNC: 145 MMOL/L — SIGNIFICANT CHANGE UP (ref 135–146)
SPECIMEN SOURCE: SIGNIFICANT CHANGE UP
WBC # BLD: 3.48 K/UL — LOW (ref 4.8–10.8)
WBC # FLD AUTO: 3.48 K/UL — LOW (ref 4.8–10.8)

## 2021-01-04 PROCEDURE — 71045 X-RAY EXAM CHEST 1 VIEW: CPT | Mod: 26

## 2021-01-04 PROCEDURE — 99233 SBSQ HOSP IP/OBS HIGH 50: CPT | Mod: CS

## 2021-01-04 PROCEDURE — 99232 SBSQ HOSP IP/OBS MODERATE 35: CPT | Mod: CS

## 2021-01-04 RX ORDER — LABETALOL HCL 100 MG
10 TABLET ORAL ONCE
Refills: 0 | Status: COMPLETED | OUTPATIENT
Start: 2021-01-04 | End: 2021-01-04

## 2021-01-04 RX ORDER — MORPHINE SULFATE 50 MG/1
2 CAPSULE, EXTENDED RELEASE ORAL ONCE
Refills: 0 | Status: DISCONTINUED | OUTPATIENT
Start: 2021-01-04 | End: 2021-01-04

## 2021-01-04 RX ORDER — FUROSEMIDE 40 MG
40 TABLET ORAL ONCE
Refills: 0 | Status: COMPLETED | OUTPATIENT
Start: 2021-01-04 | End: 2021-01-04

## 2021-01-04 RX ADMIN — Medication 1 TABLET(S): at 11:17

## 2021-01-04 RX ADMIN — MORPHINE SULFATE 2 MILLIGRAM(S): 50 CAPSULE, EXTENDED RELEASE ORAL at 04:00

## 2021-01-04 RX ADMIN — GABAPENTIN 300 MILLIGRAM(S): 400 CAPSULE ORAL at 11:18

## 2021-01-04 RX ADMIN — Medication 10 MILLIGRAM(S): at 09:23

## 2021-01-04 RX ADMIN — Medication 40 MILLIGRAM(S): at 12:11

## 2021-01-04 RX ADMIN — CHLORHEXIDINE GLUCONATE 15 MILLILITER(S): 213 SOLUTION TOPICAL at 11:18

## 2021-01-04 RX ADMIN — Medication 81 MILLIGRAM(S): at 11:16

## 2021-01-04 RX ADMIN — MONTELUKAST 10 MILLIGRAM(S): 4 TABLET, CHEWABLE ORAL at 11:17

## 2021-01-04 RX ADMIN — Medication 2 MILLIGRAM(S): at 06:10

## 2021-01-04 RX ADMIN — CEFEPIME 100 MILLIGRAM(S): 1 INJECTION, POWDER, FOR SOLUTION INTRAMUSCULAR; INTRAVENOUS at 05:42

## 2021-01-04 RX ADMIN — Medication 100 MILLIGRAM(S): at 12:12

## 2021-01-04 RX ADMIN — Medication 500 MILLIGRAM(S): at 11:16

## 2021-01-04 RX ADMIN — ENOXAPARIN SODIUM 80 MILLIGRAM(S): 100 INJECTION SUBCUTANEOUS at 16:53

## 2021-01-04 RX ADMIN — Medication 1 MILLIGRAM(S): at 03:14

## 2021-01-04 RX ADMIN — GABAPENTIN 300 MILLIGRAM(S): 400 CAPSULE ORAL at 21:03

## 2021-01-04 RX ADMIN — MORPHINE SULFATE 2 MILLIGRAM(S): 50 CAPSULE, EXTENDED RELEASE ORAL at 04:44

## 2021-01-04 RX ADMIN — CLOPIDOGREL BISULFATE 75 MILLIGRAM(S): 75 TABLET, FILM COATED ORAL at 11:17

## 2021-01-04 RX ADMIN — LAMOTRIGINE 100 MILLIGRAM(S): 25 TABLET, ORALLY DISINTEGRATING ORAL at 11:17

## 2021-01-04 RX ADMIN — ATORVASTATIN CALCIUM 20 MILLIGRAM(S): 80 TABLET, FILM COATED ORAL at 21:03

## 2021-01-04 RX ADMIN — QUETIAPINE FUMARATE 200 MILLIGRAM(S): 200 TABLET, FILM COATED ORAL at 21:03

## 2021-01-04 RX ADMIN — POLYETHYLENE GLYCOL 3350 17 GRAM(S): 17 POWDER, FOR SOLUTION ORAL at 11:18

## 2021-01-04 RX ADMIN — SENNA PLUS 2 TABLET(S): 8.6 TABLET ORAL at 21:03

## 2021-01-04 NOTE — PROGRESS NOTE ADULT - SUBJECTIVE AND OBJECTIVE BOX
OVERNIGHT EVENTS: events noted, self extubated, on BIPAP 80%    Vital Signs Last 24 Hrs  T(C): 36.6 (03 Jan 2021 23:00), Max: 37.2 (03 Jan 2021 07:01)  T(F): 97.8 (03 Jan 2021 23:00), Max: 98.9 (03 Jan 2021 07:01)  HR: 98 (04 Jan 2021 06:00) (65 - 132)  RR: 26 (04 Jan 2021 06:00) (20 - 36)  SpO2: 100% (04 Jan 2021 06:00) (87% - 100%)    PHYSICAL EXAMINATION:    GENERAL: ill looking    HEENT: Head is normocephalic and atraumatic.     NECK: Supple.    LUNGS: bl crackles    HEART: MARCELINO 3/6    ABDOMEN: Soft, nontender, and nondistended.      EXTREMITIES: Without any cyanosis, clubbing, rash, lesions or edema.    NEUROLOGIC: follows simple commands    SKIN: No ulceration or induration present.      LABS:                        8.1    3.48  )-----------( 227      ( 04 Jan 2021 05:20 )             26.9     01-04    145  |  110  |  18  ----------------------------<  90  3.5   |  27  |  0.7    Ca    8.5      04 Jan 2021 05:20  Phos  2.8     01-04  Mg     1.9     01-04    TPro  5.2<L>  /  Alb  2.9<L>  /  TBili  0.4  /  DBili  <0.2  /  AST  94<H>  /  ALT  39  /  AlkPhos  93  01-04        ABG - ( 04 Jan 2021 03:31 )  pH, Arterial: 7.36  pH, Blood: x     /  pCO2: 52    /  pO2: 134   / HCO3: 29    / Base Excess: 3.1   /  SaO2: 99                        Lactate, Blood: 0.8 mmol/L (01-03-21 @ 20:49)          01-02-21 @ 07:01  -  01-03-21 @ 07:00  --------------------------------------------------------  IN: 1849.5 mL / OUT: 3000 mL / NET: -1150.5 mL    01-03-21 @ 07:01  -  01-04-21 @ 06:58  --------------------------------------------------------  IN: 425 mL / OUT: 1060 mL / NET: -635 mL        MICROBIOLOGY:  Culture Results:   No growth to date. (01-01 @ 07:24)  Culture Results:   No growth to date. (12-31 @ 11:42)      MEDICATIONS  (STANDING):  ascorbic acid 500 milliGRAM(s) Oral daily  aspirin  chewable 81 milliGRAM(s) Oral daily  atorvastatin 20 milliGRAM(s) Oral at bedtime  budesonide 160 MICROgram(s)/formoterol 4.5 MICROgram(s) Inhaler 2 Puff(s) Inhalation two times a day  chlorhexidine 0.12% Liquid 15 milliLiter(s) Oral Mucosa every 12 hours  clopidogrel Tablet 75 milliGRAM(s) Oral daily  dexMEDEtomidine Infusion 0.2 MICROgram(s)/kG/Hr (4.13 mL/Hr) IV Continuous <Continuous>  enoxaparin Injectable 80 milliGRAM(s) SubCutaneous two times a day  fentaNYL   Infusion. 0.5 MICROgram(s)/kG/Hr (4.13 mL/Hr) IV Continuous <Continuous>  gabapentin 300 milliGRAM(s) Oral three times a day  lamoTRIgine 100 milliGRAM(s) Oral daily  metoprolol tartrate 100 milliGRAM(s) Oral every 12 hours  montelukast 10 milliGRAM(s) Oral daily  multivitamin 1 Tablet(s) Oral daily  norepinephrine Infusion 0.1 MICROgram(s)/kG/Min (15.5 mL/Hr) IV Continuous <Continuous>  pantoprazole    Tablet 40 milliGRAM(s) Oral before breakfast  polyethylene glycol 3350 17 Gram(s) Oral daily  propofol Infusion 10 MICROgram(s)/kG/Min (4.96 mL/Hr) IV Continuous <Continuous>  QUEtiapine 200 milliGRAM(s) Oral at bedtime  senna 2 Tablet(s) Oral at bedtime  sodium chloride 0.9%. 1000 milliLiter(s) (75 mL/Hr) IV Continuous <Continuous>  tiotropium 18 MICROgram(s) Capsule 1 Capsule(s) Inhalation daily    MEDICATIONS  (PRN):  acetaminophen  Suppository .. 650 milliGRAM(s) Rectal every 6 hours PRN Temp greater or equal to 38C (100.4F)  ALBUTerol    90 MICROgram(s) HFA Inhaler 1 Puff(s) Inhalation every 4 hours PRN Shortness of Breath  aluminum hydroxide/magnesium hydroxide/simethicone Suspension 30 milliLiter(s) Oral every 4 hours PRN Dyspepsia  guaifenesin/dextromethorphan  Syrup 10 milliLiter(s) Oral every 6 hours PRN Cough      RADIOLOGY & ADDITIONAL STUDIES:

## 2021-01-04 NOTE — PROGRESS NOTE ADULT - ATTENDING COMMENTS
#Progress Note Handoff  Pending (specify): improvement   Family discussion:  Spoke to daughter amara and updated the plan, PT   Disposition: Home___/SNF___/Other___/Unknown at this time_x__  Pt seen during COVID 19 Pandemic.

## 2021-01-04 NOTE — PROGRESS NOTE ADULT - ASSESSMENT
IMPRESSION:    Acute hypoxemic resp failure./ severe Covid pneumonia  /SP Intubation./ self extuabation  P A fib rapid  COPD on home oxygen  ? stroke   possible bact superinfection sp abx      PLAN:    CNS: avoid CNS depreeant    HEENT:  Oral care    PULMONARY:  HOB @ 45 degrees, BIPAP/ alternate with HHFNC 88 TO 94%    CARDIOVASCULAR: keep equal balance, cardio f/up, LASIX IV DAILY, DC A LINE    GI: GI prophylaxis                                          Feeding speech eval  RENAL:  F/u  lytes.  Correct as needed. accurate I/O    INFECTIOUS DISEASE: f/up cx, cefepime/, f/up infl markers    HEMATOLOGICAL:  DVT prophylaxis. lovenox, trend CBC    ENDOCRINE:  Follow up FS.  Insulin protocol if needed.    MUSCULOSKELETAL: bedrest    DISPOSITION: ED3  poor prognosis  advance directives

## 2021-01-04 NOTE — PROGRESS NOTE ADULT - SUBJECTIVE AND OBJECTIVE BOX
SHAUN COATES 75y Female  MRN#: [id number]   CODE STATUS: Full code      SUBJECTIVE  Patient is a 75y old Female who presents with a chief complaint of chest pain weakness (04 Jan 2021 06:57)  Currently admitted to medicine with the primary diagnosis of Septic shock    no overnight complaints    OBJECTIVE  PAST MEDICAL & SURGICAL HISTORY  Falls    Congestive heart failure    Transient ischemic attack    FLAIVO on CPAP    Bipolar 1 disorder    Allergic reaction    PVD (peripheral vascular disease)    Other emphysema    Other cardiomyopathy    TIA (transient ischemic attack)    COPD (chronic obstructive pulmonary disease)    Depression    Hypertension    History of cholecystectomy      ALLERGIES:  codeine (Other (Moderate))  Depakote (Unknown)  Dilaudid (Short breath; Rash)  IV Contrast (Anaphylaxis)  losartan (Angioedema)  Risperdal (Other)  verapamil (Short breath; Angioedema)    MEDICATIONS:  STANDING MEDICATIONS  ascorbic acid 500 milliGRAM(s) Oral daily  aspirin  chewable 81 milliGRAM(s) Oral daily  atorvastatin 20 milliGRAM(s) Oral at bedtime  budesonide 160 MICROgram(s)/formoterol 4.5 MICROgram(s) Inhaler 2 Puff(s) Inhalation two times a day  chlorhexidine 0.12% Liquid 15 milliLiter(s) Oral Mucosa every 12 hours  clopidogrel Tablet 75 milliGRAM(s) Oral daily  dexMEDEtomidine Infusion 0.2 MICROgram(s)/kG/Hr IV Continuous <Continuous>  enoxaparin Injectable 80 milliGRAM(s) SubCutaneous two times a day  fentaNYL   Infusion. 0.5 MICROgram(s)/kG/Hr IV Continuous <Continuous>  gabapentin 300 milliGRAM(s) Oral three times a day  lamoTRIgine 100 milliGRAM(s) Oral daily  metoprolol tartrate 100 milliGRAM(s) Oral every 12 hours  montelukast 10 milliGRAM(s) Oral daily  multivitamin 1 Tablet(s) Oral daily  norepinephrine Infusion 0.1 MICROgram(s)/kG/Min IV Continuous <Continuous>  pantoprazole    Tablet 40 milliGRAM(s) Oral before breakfast  polyethylene glycol 3350 17 Gram(s) Oral daily  propofol Infusion 10 MICROgram(s)/kG/Min IV Continuous <Continuous>  QUEtiapine 200 milliGRAM(s) Oral at bedtime  senna 2 Tablet(s) Oral at bedtime  sodium chloride 0.9%. 1000 milliLiter(s) IV Continuous <Continuous>  tiotropium 18 MICROgram(s) Capsule 1 Capsule(s) Inhalation daily    PRN MEDICATIONS  acetaminophen  Suppository .. 650 milliGRAM(s) Rectal every 6 hours PRN  ALBUTerol    90 MICROgram(s) HFA Inhaler 1 Puff(s) Inhalation every 4 hours PRN  aluminum hydroxide/magnesium hydroxide/simethicone Suspension 30 milliLiter(s) Oral every 4 hours PRN  guaifenesin/dextromethorphan  Syrup 10 milliLiter(s) Oral every 6 hours PRN      VITAL SIGNS: Last 24 Hours  T(C): 36.9 (04 Jan 2021 11:00), Max: 36.9 (04 Jan 2021 11:00)  T(F): 98.5 (04 Jan 2021 11:00), Max: 98.5 (04 Jan 2021 11:00)  HR: 86 (04 Jan 2021 11:00) (79 - 132)  BP: --  BP(mean): --  RR: 22 (04 Jan 2021 11:00) (20 - 36)  SpO2: 92% (04 Jan 2021 11:00) (87% - 100%)    LABS:                        8.1    3.48  )-----------( 227      ( 04 Jan 2021 05:20 )             26.9     01-04    145  |  110  |  18  ----------------------------<  90  3.5   |  27  |  0.7    Ca    8.5      04 Jan 2021 05:20  Phos  2.8     01-04  Mg     1.9     01-04    TPro  5.2<L>  /  Alb  2.9<L>  /  TBili  0.4  /  DBili  <0.2  /  AST  94<H>  /  ALT  39  /  AlkPhos  93  01-04        ABG - ( 04 Jan 2021 03:31 )  pH, Arterial: 7.36  pH, Blood: x     /  pCO2: 52    /  pO2: 134   / HCO3: 29    / Base Excess: 3.1   /  SaO2: 99                Lactate, Blood: 0.8 mmol/L (01-03-21 @ 20:49)          RADIOLOGY:      CT Head No Cont (12.26.20 @ 06:38) >    IMPRESSION:    No CT evidence of acute territorial infarct or other acute intracranial pathology.       SHAUN COATES 75y Female  MRN#: [id number]   CODE STATUS: Full code      SUBJECTIVE  Patient is a 75y old Female who presents with a chief complaint of chest pain weakness (04 Jan 2021 06:57)  Currently admitted to medicine with the primary diagnosis of Septic shock    no overnight complaints    Attending Note: Pt seen and examined at bedside. Pt remains stable on venti mask. Mentating well. States that her mouth is dry. Cleared by speech and swallow.     OBJECTIVE  PAST MEDICAL & SURGICAL HISTORY  Falls    Congestive heart failure    Transient ischemic attack    FLAVIO on CPAP    Bipolar 1 disorder    Allergic reaction    PVD (peripheral vascular disease)    Other emphysema    Other cardiomyopathy    TIA (transient ischemic attack)    COPD (chronic obstructive pulmonary disease)    Depression    Hypertension    History of cholecystectomy      ALLERGIES:  codeine (Other (Moderate))  Depakote (Unknown)  Dilaudid (Short breath; Rash)  IV Contrast (Anaphylaxis)  losartan (Angioedema)  Risperdal (Other)  verapamil (Short breath; Angioedema)    MEDICATIONS:  STANDING MEDICATIONS  ascorbic acid 500 milliGRAM(s) Oral daily  aspirin  chewable 81 milliGRAM(s) Oral daily  atorvastatin 20 milliGRAM(s) Oral at bedtime  budesonide 160 MICROgram(s)/formoterol 4.5 MICROgram(s) Inhaler 2 Puff(s) Inhalation two times a day  chlorhexidine 0.12% Liquid 15 milliLiter(s) Oral Mucosa every 12 hours  clopidogrel Tablet 75 milliGRAM(s) Oral daily  dexMEDEtomidine Infusion 0.2 MICROgram(s)/kG/Hr IV Continuous <Continuous>  enoxaparin Injectable 80 milliGRAM(s) SubCutaneous two times a day  fentaNYL   Infusion. 0.5 MICROgram(s)/kG/Hr IV Continuous <Continuous>  gabapentin 300 milliGRAM(s) Oral three times a day  lamoTRIgine 100 milliGRAM(s) Oral daily  metoprolol tartrate 100 milliGRAM(s) Oral every 12 hours  montelukast 10 milliGRAM(s) Oral daily  multivitamin 1 Tablet(s) Oral daily  norepinephrine Infusion 0.1 MICROgram(s)/kG/Min IV Continuous <Continuous>  pantoprazole    Tablet 40 milliGRAM(s) Oral before breakfast  polyethylene glycol 3350 17 Gram(s) Oral daily  propofol Infusion 10 MICROgram(s)/kG/Min IV Continuous <Continuous>  QUEtiapine 200 milliGRAM(s) Oral at bedtime  senna 2 Tablet(s) Oral at bedtime  sodium chloride 0.9%. 1000 milliLiter(s) IV Continuous <Continuous>  tiotropium 18 MICROgram(s) Capsule 1 Capsule(s) Inhalation daily    PRN MEDICATIONS  acetaminophen  Suppository .. 650 milliGRAM(s) Rectal every 6 hours PRN  ALBUTerol    90 MICROgram(s) HFA Inhaler 1 Puff(s) Inhalation every 4 hours PRN  aluminum hydroxide/magnesium hydroxide/simethicone Suspension 30 milliLiter(s) Oral every 4 hours PRN  guaifenesin/dextromethorphan  Syrup 10 milliLiter(s) Oral every 6 hours PRN      VITAL SIGNS: Last 24 Hours  T(C): 36.9 (04 Jan 2021 11:00), Max: 36.9 (04 Jan 2021 11:00)  T(F): 98.5 (04 Jan 2021 11:00), Max: 98.5 (04 Jan 2021 11:00)  HR: 86 (04 Jan 2021 11:00) (79 - 132)  BP: --  BP(mean): --  RR: 22 (04 Jan 2021 11:00) (20 - 36)  SpO2: 92% (04 Jan 2021 11:00) (87% - 100%)    LABS:                        8.1    3.48  )-----------( 227      ( 04 Jan 2021 05:20 )             26.9     01-04    145  |  110  |  18  ----------------------------<  90  3.5   |  27  |  0.7    Ca    8.5      04 Jan 2021 05:20  Phos  2.8     01-04  Mg     1.9     01-04    TPro  5.2<L>  /  Alb  2.9<L>  /  TBili  0.4  /  DBili  <0.2  /  AST  94<H>  /  ALT  39  /  AlkPhos  93  01-04        ABG - ( 04 Jan 2021 03:31 )  pH, Arterial: 7.36  pH, Blood: x     /  pCO2: 52    /  pO2: 134   / HCO3: 29    / Base Excess: 3.1   /  SaO2: 99                Lactate, Blood: 0.8 mmol/L (01-03-21 @ 20:49)          RADIOLOGY:      CT Head No Cont (12.26.20 @ 06:38) >    IMPRESSION:    No CT evidence of acute territorial infarct or other acute intracranial pathology.

## 2021-01-04 NOTE — SWALLOW BEDSIDE ASSESSMENT ADULT - COMMENTS
PMH: neck nodules, LICA stenosis, planned for carotid endarterectomy on 12/15, COPD on 5L O2. 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 Known to ST services from last admission w/ reccs for reg w/ thin, no [place]-pharyngeal dysphagia

## 2021-01-04 NOTE — PROGRESS NOTE ADULT - ASSESSMENT
75 year old woman with medical history of COPD on 5L home oxygen, paroxysmal afib on pradaxa, carotid stenosis (L 80%, R 40%) s/p L carotid stenting recently , HFpEF (EF 70% Dec 2020), HTN, HLD, bipolar disorder, left parotid mass, recently admitted between 12/7-12/11 for COVID PNA, readmitted for COVID 19 Pneumonia     # Acute Chronic respiratory failure 2/2 likely due to COVID PNA with septic Shock s/p self extubation on 1/3/20, currently on venturi mask saturating 92%  - s/p 12/30 Tocilizumab 400mg x1  - s/p steroids and RDV on previous admission  - incentive spirometry  - prone positioning if can tolerate    #Paroxysmal atrial fibrillation w/ RVR   - home dose: Pradaxa 150mg PO BID, switched to Lovenox 80 mg BID  for now  - ASA 81 mg  and Plavix recently added after a recent TIA last month.   - c/w Metoprolol for rate control    # Left ICA s/p TCAR (transcarotid artery revascularization) 12/04 with TIA last admission  - c/w plavix, ASA  - Code stroke on 12/05 for aphasia - workup was negative for acute stroke    #Left intra parotid mass   - Patient will need out patient biopsy     # Chronic HFpEF  - ECHO (12/2/2020): Hyperdynamic global left ventricular systolic function; EF of 70 %; Moderately increased LV wall thickness. Grade I diastolic dysfunction.    # HTN: c/w metoprolol, can add nifedipine      #HLD - atorvastatin    #Bipolar Disorder  -Continue home meds Lamictal 100mg PO daily, Seroquel 200mg at bedtime, and Ativan 0.5mg PO twice daily PRN    #) MISC  Activity: IAT  DVT ppx: lovenox   GI ppx: protonix  Diet: DASH/TLC  Code: Full  Dispo: medical optimization  CHG wash     75 year old woman with medical history of COPD on 5L home oxygen, paroxysmal afib on pradaxa, carotid stenosis (L 80%, R 40%) s/p L carotid stenting recently , HFpEF (EF 70% Dec 2020), HTN, HLD, bipolar disorder, left parotid mass, recently admitted between 12/7-12/11 for COVID PNA, readmitted for COVID 19 Pneumonia     # Acute Chronic respiratory failure 2/2 likely due to COVID PNA with septic Shock s/p self extubation on 1/3/20, currently on venturi mask saturating 92%  - s/p 12/30 Tocilizumab 400mg x1  - s/p steroids and RDV on previous admission  - incentive spirometry  - prone positioning if can tolerate  - lasix 40 mg x1 today   - cleared by SS    #Paroxysmal atrial fibrillation w/ RVR   - home dose: Pradaxa 150mg PO BID, switched to Lovenox 80 mg BID  for now  - ASA 81 mg  and Plavix recently added after a recent TIA last month.   - c/w Metoprolol for rate control    # Left ICA s/p TCAR (transcarotid artery revascularization) 12/04 with TIA last admission  - c/w plavix, ASA  - Code stroke on 12/05 for aphasia - workup was negative for acute stroke    #Left intra parotid mass   - Patient will need out patient biopsy     # Chronic HFpEF  - ECHO (12/2/2020): Hyperdynamic global left ventricular systolic function; EF of 70 %; Moderately increased LV wall thickness. Grade I diastolic dysfunction.    # HTN: c/w metoprolol, can add nifedipine      #HLD - atorvastatin    #Bipolar Disorder  -Continue home meds Lamictal 100mg PO daily, Seroquel 200mg at bedtime, and Ativan 0.5mg PO twice daily PRN  - psych consult to assist with anxiety component     #) MISC  Activity: IAT  DVT ppx: lovenox   GI ppx: protonix  Diet: DASH/TLC  Code: Full  Dispo: medical optimization  CHG wash

## 2021-01-05 DIAGNOSIS — F31.70 BIPOLAR DISORDER, CURRENTLY IN REMISSION, MOST RECENT EPISODE UNSPECIFIED: ICD-10-CM

## 2021-01-05 DIAGNOSIS — R41.0 DISORIENTATION, UNSPECIFIED: ICD-10-CM

## 2021-01-05 LAB
ALBUMIN SERPL ELPH-MCNC: 3 G/DL — LOW (ref 3.5–5.2)
ALP SERPL-CCNC: 97 U/L — SIGNIFICANT CHANGE UP (ref 30–115)
ALT FLD-CCNC: 38 U/L — SIGNIFICANT CHANGE UP (ref 0–41)
ANION GAP SERPL CALC-SCNC: 8 MMOL/L — SIGNIFICANT CHANGE UP (ref 7–14)
AST SERPL-CCNC: 70 U/L — HIGH (ref 0–41)
BASOPHILS # BLD AUTO: 0.02 K/UL — SIGNIFICANT CHANGE UP (ref 0–0.2)
BASOPHILS NFR BLD AUTO: 0.5 % — SIGNIFICANT CHANGE UP (ref 0–1)
BILIRUB SERPL-MCNC: 0.5 MG/DL — SIGNIFICANT CHANGE UP (ref 0.2–1.2)
BUN SERPL-MCNC: 18 MG/DL — SIGNIFICANT CHANGE UP (ref 10–20)
CALCIUM SERPL-MCNC: 8.6 MG/DL — SIGNIFICANT CHANGE UP (ref 8.5–10.1)
CHLORIDE SERPL-SCNC: 107 MMOL/L — SIGNIFICANT CHANGE UP (ref 98–110)
CO2 SERPL-SCNC: 30 MMOL/L — SIGNIFICANT CHANGE UP (ref 17–32)
CREAT SERPL-MCNC: 0.6 MG/DL — LOW (ref 0.7–1.5)
CRP SERPL-MCNC: 1.08 MG/DL — HIGH (ref 0–0.4)
CULTURE RESULTS: SIGNIFICANT CHANGE UP
D DIMER BLD IA.RAPID-MCNC: 987 NG/ML DDU — HIGH (ref 0–230)
EOSINOPHIL # BLD AUTO: 0.25 K/UL — SIGNIFICANT CHANGE UP (ref 0–0.7)
EOSINOPHIL NFR BLD AUTO: 6.7 % — SIGNIFICANT CHANGE UP (ref 0–8)
FERRITIN SERPL-MCNC: 431 NG/ML — HIGH (ref 15–150)
GLUCOSE BLDC GLUCOMTR-MCNC: 87 MG/DL — SIGNIFICANT CHANGE UP (ref 70–99)
GLUCOSE BLDC GLUCOMTR-MCNC: 93 MG/DL — SIGNIFICANT CHANGE UP (ref 70–99)
GLUCOSE SERPL-MCNC: 93 MG/DL — SIGNIFICANT CHANGE UP (ref 70–99)
HCT VFR BLD CALC: 26.9 % — LOW (ref 37–47)
HGB BLD-MCNC: 8.2 G/DL — LOW (ref 12–16)
IMM GRANULOCYTES NFR BLD AUTO: 0.5 % — HIGH (ref 0.1–0.3)
LYMPHOCYTES # BLD AUTO: 1.26 K/UL — SIGNIFICANT CHANGE UP (ref 1.2–3.4)
LYMPHOCYTES # BLD AUTO: 33.7 % — SIGNIFICANT CHANGE UP (ref 20.5–51.1)
MAGNESIUM SERPL-MCNC: 1.9 MG/DL — SIGNIFICANT CHANGE UP (ref 1.8–2.4)
MCHC RBC-ENTMCNC: 30.4 PG — SIGNIFICANT CHANGE UP (ref 27–31)
MCHC RBC-ENTMCNC: 30.5 G/DL — LOW (ref 32–37)
MCV RBC AUTO: 99.6 FL — HIGH (ref 81–99)
MONOCYTES # BLD AUTO: 0.27 K/UL — SIGNIFICANT CHANGE UP (ref 0.1–0.6)
MONOCYTES NFR BLD AUTO: 7.2 % — SIGNIFICANT CHANGE UP (ref 1.7–9.3)
NEUTROPHILS # BLD AUTO: 1.92 K/UL — SIGNIFICANT CHANGE UP (ref 1.4–6.5)
NEUTROPHILS NFR BLD AUTO: 51.4 % — SIGNIFICANT CHANGE UP (ref 42.2–75.2)
NRBC # BLD: 0 /100 WBCS — SIGNIFICANT CHANGE UP (ref 0–0)
PHOSPHATE SERPL-MCNC: 3.2 MG/DL — SIGNIFICANT CHANGE UP (ref 2.1–4.9)
PLATELET # BLD AUTO: 239 K/UL — SIGNIFICANT CHANGE UP (ref 130–400)
POTASSIUM SERPL-MCNC: 3.5 MMOL/L — SIGNIFICANT CHANGE UP (ref 3.5–5)
POTASSIUM SERPL-SCNC: 3.5 MMOL/L — SIGNIFICANT CHANGE UP (ref 3.5–5)
PROT SERPL-MCNC: 5.6 G/DL — LOW (ref 6–8)
RBC # BLD: 2.7 M/UL — LOW (ref 4.2–5.4)
RBC # FLD: 15.8 % — HIGH (ref 11.5–14.5)
SODIUM SERPL-SCNC: 145 MMOL/L — SIGNIFICANT CHANGE UP (ref 135–146)
SPECIMEN SOURCE: SIGNIFICANT CHANGE UP
WBC # BLD: 3.74 K/UL — LOW (ref 4.8–10.8)
WBC # FLD AUTO: 3.74 K/UL — LOW (ref 4.8–10.8)

## 2021-01-05 PROCEDURE — 99233 SBSQ HOSP IP/OBS HIGH 50: CPT | Mod: CS

## 2021-01-05 PROCEDURE — 99232 SBSQ HOSP IP/OBS MODERATE 35: CPT | Mod: CS

## 2021-01-05 PROCEDURE — 99222 1ST HOSP IP/OBS MODERATE 55: CPT | Mod: GC

## 2021-01-05 RX ADMIN — SENNA PLUS 2 TABLET(S): 8.6 TABLET ORAL at 21:46

## 2021-01-05 RX ADMIN — Medication 81 MILLIGRAM(S): at 11:19

## 2021-01-05 RX ADMIN — Medication 500 MILLIGRAM(S): at 11:19

## 2021-01-05 RX ADMIN — POLYETHYLENE GLYCOL 3350 17 GRAM(S): 17 POWDER, FOR SOLUTION ORAL at 11:20

## 2021-01-05 RX ADMIN — GABAPENTIN 300 MILLIGRAM(S): 400 CAPSULE ORAL at 06:00

## 2021-01-05 RX ADMIN — PANTOPRAZOLE SODIUM 40 MILLIGRAM(S): 20 TABLET, DELAYED RELEASE ORAL at 06:01

## 2021-01-05 RX ADMIN — LAMOTRIGINE 100 MILLIGRAM(S): 25 TABLET, ORALLY DISINTEGRATING ORAL at 11:20

## 2021-01-05 RX ADMIN — CHLORHEXIDINE GLUCONATE 15 MILLILITER(S): 213 SOLUTION TOPICAL at 17:55

## 2021-01-05 RX ADMIN — MONTELUKAST 10 MILLIGRAM(S): 4 TABLET, CHEWABLE ORAL at 11:20

## 2021-01-05 RX ADMIN — Medication 100 MILLIGRAM(S): at 06:01

## 2021-01-05 RX ADMIN — ATORVASTATIN CALCIUM 20 MILLIGRAM(S): 80 TABLET, FILM COATED ORAL at 21:46

## 2021-01-05 RX ADMIN — Medication 100 MILLIGRAM(S): at 17:55

## 2021-01-05 RX ADMIN — GABAPENTIN 300 MILLIGRAM(S): 400 CAPSULE ORAL at 21:46

## 2021-01-05 RX ADMIN — CLOPIDOGREL BISULFATE 75 MILLIGRAM(S): 75 TABLET, FILM COATED ORAL at 11:20

## 2021-01-05 RX ADMIN — GABAPENTIN 300 MILLIGRAM(S): 400 CAPSULE ORAL at 13:15

## 2021-01-05 RX ADMIN — Medication 1 TABLET(S): at 11:19

## 2021-01-05 RX ADMIN — Medication 0.5 MILLIGRAM(S): at 17:58

## 2021-01-05 RX ADMIN — QUETIAPINE FUMARATE 200 MILLIGRAM(S): 200 TABLET, FILM COATED ORAL at 21:46

## 2021-01-05 RX ADMIN — ENOXAPARIN SODIUM 80 MILLIGRAM(S): 100 INJECTION SUBCUTANEOUS at 06:00

## 2021-01-05 RX ADMIN — Medication 0.5 MILLIGRAM(S): at 10:03

## 2021-01-05 RX ADMIN — ENOXAPARIN SODIUM 80 MILLIGRAM(S): 100 INJECTION SUBCUTANEOUS at 17:55

## 2021-01-05 NOTE — PROGRESS NOTE ADULT - ASSESSMENT
75 year old woman with medical history of COPD on 5L home oxygen, paroxysmal afib on pradaxa, carotid stenosis (L 80%, R 40%) s/p L carotid stenting recently , HFpEF (EF 70% Dec 2020), HTN, HLD, bipolar disorder, left parotid mass, recently admitted between 12/7-12/11 for COVID PNA, readmitted for COVID 19 Pneumonia     # Acute Chronic respiratory failure 2/2 likely due to COVID PNA with septic Shock s/p self extubation on 1/3/20, currently on venturi mask saturating 92%  - s/p 12/30 Tocilizumab 400mg x1  - s/p steroids and RDV on previous admission  - incentive spirometry  - prone positioning if can tolerate  - lasix 40 mg x1 today   - cleared by SS    #Paroxysmal atrial fibrillation w/ RVR   - home dose: Pradaxa 150mg PO BID, switched to Lovenox 80 mg BID  for now  - ASA 81 mg  and Plavix recently added after a recent TIA last month.   - c/w Metoprolol for rate control    # Left ICA s/p TCAR (transcarotid artery revascularization) 12/04 with TIA last admission  - c/w plavix, ASA  - Code stroke on 12/05 for aphasia - workup was negative for acute stroke    #Left intra parotid mass   - Patient will need out patient biopsy     # Chronic HFpEF  - ECHO (12/2/2020): Hyperdynamic global left ventricular systolic function; EF of 70 %; Moderately increased LV wall thickness. Grade I diastolic dysfunction.    # HTN: c/w metoprolol, can add nifedipine      #HLD - atorvastatin    #Bipolar Disorder  -Continue home meds Lamictal 100mg PO daily, Seroquel 200mg at bedtime, and Ativan 0.5mg PO twice daily PRN  - psych consult to assist with anxiety component     #) MISC  Activity: IAT  DVT ppx: lovenox   GI ppx: protonix  Diet: DASH/TLC  Code: Full  Dispo: medical optimization  CHG wash     75 year old woman with medical history of COPD on 5L home oxygen, paroxysmal afib on pradaxa, carotid stenosis (L 80%, R 40%) s/p L carotid stenting recently , HFpEF (EF 70% Dec 2020), HTN, HLD, bipolar disorder, left parotid mass, recently admitted between 12/7-12/11 for COVID PNA, readmitted for COVID 19 Pneumonia     # Acute Chronic respiratory failure 2/2 likely due to COVID PNA with septic Shock s/p self extubation on 1/3/20, currently on NRB, will attempt to start her on HFNC  - s/p 12/30 Tocilizumab 400mg x1  - s/p steroids and RDV on previous admission  - incentive spirometry  - prone positioning if can tolerate  - update: currently on NRB, will attempt to place on HFNC and wean as tolerated  - pending repeat speech and swallow, if fail again, will speak with Nutritionist for optimization    #Paroxysmal atrial fibrillation w/ RVR   - home dose: Pradaxa 150mg PO BID, switched to Lovenox 80 mg BID for now  - ASA 81 mg  and Plavix recently added after a recent TIA last month.   - c/w Metoprolol for rate control    # Left ICA s/p TCAR (transcarotid artery revascularization) 12/04 with TIA last admission  - c/w plavix, ASA  - Code stroke on 12/05 for aphasia - workup was negative for acute stroke    #Left intra parotid mass   - Patient will need out patient biopsy     # Chronic HFpEF  - ECHO (12/2/2020): Hyperdynamic global left ventricular systolic function; EF of 70 %; Moderately increased LV wall thickness. Grade I diastolic dysfunction.    # HTN: c/w metoprolol, can add nifedipine      #HLD - atorvastatin    #Bipolar Disorder  -Continue home meds Lamictal 100mg PO daily, Seroquel 200mg at bedtime, and Ativan 0.5mg PO twice daily PRN  - psych consult to assist with anxiety component     #) MISC  Activity: IAT  DVT ppx: lovenox   GI ppx: protonix  Diet: npo until speech and swallow recommendation made  Code: Full  Dispo: medical optimization  CHG wash     75 year old woman with medical history of COPD on 5L home oxygen, paroxysmal afib on pradaxa, carotid stenosis (L 80%, R 40%) s/p L carotid stenting recently , HFpEF (EF 70% Dec 2020), HTN, HLD, bipolar disorder, left parotid mass, recently admitted between 12/7-12/11 for COVID PNA, readmitted for COVID 19 Pneumonia     # Acute Chronic respiratory failure 2/2 likely due to COVID PNA with septic Shock s/p self extubation on 1/3/20, currently on NRB, will attempt to start her on HFNC  - s/p 12/30 Tocilizumab 400mg x1  - s/p steroids and RDV on previous admission  - off abx (s/p 12 days cefepime)  - incentive spirometry  - prone positioning if can tolerate  - update: currently on NRB, will attempt to place on HFNC and wean as tolerated  - pending repeat speech and swallow, if fail again, will speak with Nutritionist for optimization    #Paroxysmal atrial fibrillation w/ RVR   - home dose: Pradaxa 150mg PO BID, switched to Lovenox 80 mg BID for now  - ASA 81 mg  and Plavix recently added after a recent TIA last month.   - c/w Metoprolol for rate control    # Left ICA s/p TCAR (transcarotid artery revascularization) 12/04 with TIA last admission  - c/w plavix, ASA  - Code stroke on 12/05 for aphasia - workup was negative for acute stroke    #Left intra parotid mass   - Patient will need out patient biopsy     # Chronic HFpEF  - ECHO (12/2/2020): Hyperdynamic global left ventricular systolic function; EF of 70 %; Moderately increased LV wall thickness. Grade I diastolic dysfunction.    # HTN: c/w metoprolol, can add nifedipine      #HLD - atorvastatin    #Bipolar Disorder  -Continue home meds Lamictal 100mg PO daily, Seroquel 200mg at bedtime, and Ativan 0.5mg PO twice daily PRN  - psych consult to assist with anxiety component     #) MISC  Activity: IAT  DVT ppx: lovenox   GI ppx: protonix  Diet: npo until speech and swallow recommendation made  Code: Full  Dispo: medical optimization  CHG wash     75 year old woman with medical history of COPD on 5L home oxygen, paroxysmal afib on pradaxa, carotid stenosis (L 80%, R 40%) s/p L carotid stenting recently , HFpEF (EF 70% Dec 2020), HTN, HLD, bipolar disorder, left parotid mass, recently admitted between 12/7-12/11 for COVID PNA, readmitted for COVID 19 Pneumonia     # Acute Chronic respiratory failure 2/2 likely due to COVID PNA with septic Shock s/p self extubation on 1/3/20, currently on NRB, will attempt to start her on HFNC  - s/p 12/30 Tocilizumab 400mg x1  - s/p steroids and RDV on previous admission  - off abx (s/p 12 days cefepime)  - incentive spirometry  - prone positioning if can tolerate  - update: currently on vent will attempt to place on HFNC and wean as tolerated  - pending repeat speech and swallow, if fail again, will speak with Nutritionist for optimization    #Paroxysmal atrial fibrillation w/ RVR   - home dose: Pradaxa 150mg PO BID, switched to Lovenox 80 mg BID for now  - ASA 81 mg  and Plavix recently added after a recent TIA last month.   - c/w Metoprolol for rate control    #hand tremors- possible 2/2 sedation vs need to be on psych meds, continue to monitor if worsens consider neuro consult     # Left ICA s/p TCAR (transcarotid artery revascularization) 12/04 with TIA last admission  - c/w plavix, ASA  - Code stroke on 12/05 for aphasia - workup was negative for acute stroke    #Left intra parotid mass   - Patient will need out patient biopsy     # Chronic HFpEF  - ECHO (12/2/2020): Hyperdynamic global left ventricular systolic function; EF of 70 %; Moderately increased LV wall thickness. Grade I diastolic dysfunction.    # HTN: c/w metoprolol, can add nifedipine      #HLD - atorvastatin    #Bipolar Disorder  -Continue home meds Lamictal 100mg PO daily, Seroquel 200mg at bedtime, and Ativan 0.5mg PO twice daily PRN  - psych consult to assist with anxiety component     #) MISC  Activity: IAT  DVT ppx: lovenox   GI ppx: protonix  Diet: npo until speech and swallow recommendation made  Code: Full  Dispo: medical optimization  CHG wash

## 2021-01-05 NOTE — PROGRESS NOTE ADULT - ASSESSMENT
IMPRESSION:    Acute hypoxemic resp failure./ severe Covid pneumonia   P A fib rapid  COPD on home oxygen  ? stroke   possible bact superinfection sp abx      PLAN:    CNS: avoid CNS depreeant    HEENT:  Oral care    PULMONARY:  HOB @ 45 degrees, BIPAP/ alternate with HHFNC 88 TO 94%    CARDIOVASCULAR: keep equal balance, cardio f/up, LASIX IV DAILY,     GI: GI prophylaxis                                          Feeding speech eval  RENAL:  F/u  lytes.  Correct as needed. accurate I/O    INFECTIOUS DISEASE: f/up cx, cefepime/, f/up infl markers    HEMATOLOGICAL:  DVT prophylaxis. lovenox, trend CBC    ENDOCRINE:  Follow up FS.  Insulin protocol if needed.    MUSCULOSKELETAL: bedrest    DISPOSITION: ED3  poor prognosis  advance directives

## 2021-01-05 NOTE — PROGRESS NOTE ADULT - SUBJECTIVE AND OBJECTIVE BOX
SHAUN COATES 75y Female  MRN#: [id number]   CODE STATUS: Full code      SUBJECTIVE  Patient is a 75y old Female who presents with a chief complaint of chest pain weakness (04 Jan 2021 06:57)  Currently admitted to medicine with the primary diagnosis of Septic shock    no overnight complaints    Attending Note: Pt seen and examined at bedside. Pt remains stable on venti mask. Mentating well. States that her mouth is dry. Cleared by speech and swallow.     OBJECTIVE  PAST MEDICAL & SURGICAL HISTORY  Falls    Congestive heart failure    Transient ischemic attack    FLAVIO on CPAP    Bipolar 1 disorder    Allergic reaction    PVD (peripheral vascular disease)    Other emphysema    Other cardiomyopathy    TIA (transient ischemic attack)    COPD (chronic obstructive pulmonary disease)    Depression    Hypertension    History of cholecystectomy      ALLERGIES:  codeine (Other (Moderate))  Depakote (Unknown)  Dilaudid (Short breath; Rash)  IV Contrast (Anaphylaxis)  losartan (Angioedema)  Risperdal (Other)  verapamil (Short breath; Angioedema)    MEDICATIONS:  STANDING MEDICATIONS  ascorbic acid 500 milliGRAM(s) Oral daily  aspirin  chewable 81 milliGRAM(s) Oral daily  atorvastatin 20 milliGRAM(s) Oral at bedtime  budesonide 160 MICROgram(s)/formoterol 4.5 MICROgram(s) Inhaler 2 Puff(s) Inhalation two times a day  chlorhexidine 0.12% Liquid 15 milliLiter(s) Oral Mucosa every 12 hours  clopidogrel Tablet 75 milliGRAM(s) Oral daily  dexMEDEtomidine Infusion 0.2 MICROgram(s)/kG/Hr IV Continuous <Continuous>  enoxaparin Injectable 80 milliGRAM(s) SubCutaneous two times a day  fentaNYL   Infusion. 0.5 MICROgram(s)/kG/Hr IV Continuous <Continuous>  gabapentin 300 milliGRAM(s) Oral three times a day  lamoTRIgine 100 milliGRAM(s) Oral daily  metoprolol tartrate 100 milliGRAM(s) Oral every 12 hours  montelukast 10 milliGRAM(s) Oral daily  multivitamin 1 Tablet(s) Oral daily  norepinephrine Infusion 0.1 MICROgram(s)/kG/Min IV Continuous <Continuous>  pantoprazole    Tablet 40 milliGRAM(s) Oral before breakfast  polyethylene glycol 3350 17 Gram(s) Oral daily  propofol Infusion 10 MICROgram(s)/kG/Min IV Continuous <Continuous>  QUEtiapine 200 milliGRAM(s) Oral at bedtime  senna 2 Tablet(s) Oral at bedtime  sodium chloride 0.9%. 1000 milliLiter(s) IV Continuous <Continuous>  tiotropium 18 MICROgram(s) Capsule 1 Capsule(s) Inhalation daily    PRN MEDICATIONS  acetaminophen  Suppository .. 650 milliGRAM(s) Rectal every 6 hours PRN  ALBUTerol    90 MICROgram(s) HFA Inhaler 1 Puff(s) Inhalation every 4 hours PRN  aluminum hydroxide/magnesium hydroxide/simethicone Suspension 30 milliLiter(s) Oral every 4 hours PRN  guaifenesin/dextromethorphan  Syrup 10 milliLiter(s) Oral every 6 hours PRN      VITAL SIGNS: Last 24 Hours  T(C): 36.9 (04 Jan 2021 11:00), Max: 36.9 (04 Jan 2021 11:00)  T(F): 98.5 (04 Jan 2021 11:00), Max: 98.5 (04 Jan 2021 11:00)  HR: 86 (04 Jan 2021 11:00) (79 - 132)  BP: --  BP(mean): --  RR: 22 (04 Jan 2021 11:00) (20 - 36)  SpO2: 92% (04 Jan 2021 11:00) (87% - 100%)    LABS:                        8.1    3.48  )-----------( 227      ( 04 Jan 2021 05:20 )             26.9     01-04    145  |  110  |  18  ----------------------------<  90  3.5   |  27  |  0.7    Ca    8.5      04 Jan 2021 05:20  Phos  2.8     01-04  Mg     1.9     01-04    TPro  5.2<L>  /  Alb  2.9<L>  /  TBili  0.4  /  DBili  <0.2  /  AST  94<H>  /  ALT  39  /  AlkPhos  93  01-04        ABG - ( 04 Jan 2021 03:31 )  pH, Arterial: 7.36  pH, Blood: x     /  pCO2: 52    /  pO2: 134   / HCO3: 29    / Base Excess: 3.1   /  SaO2: 99                Lactate, Blood: 0.8 mmol/L (01-03-21 @ 20:49)          RADIOLOGY:      CT Head No Cont (12.26.20 @ 06:38) >    IMPRESSION:    No CT evidence of acute territorial infarct or other acute intracranial pathology.       SHAUN COATES 75y Female  MRN#: [id number]   CODE STATUS: Full code      SUBJECTIVE  Patient is a 75y old Female who presents with a chief complaint of chest pain weakness (04 Jan 2021 06:57)  Currently admitted to medicine with the primary diagnosis of Septic shock    no overnight complaints, patient appears much more alert, has shaking/tremors on hand when grabbing cup, otherwise stable, will attempt to wean off from NRB      OBJECTIVE  PAST MEDICAL & SURGICAL HISTORY  Falls    Congestive heart failure    Transient ischemic attack    FLAVIO on CPAP    Bipolar 1 disorder    Allergic reaction    PVD (peripheral vascular disease)    Other emphysema    Other cardiomyopathy    TIA (transient ischemic attack)    COPD (chronic obstructive pulmonary disease)    Depression    Hypertension    History of cholecystectomy      ALLERGIES:  codeine (Other (Moderate))  Depakote (Unknown)  Dilaudid (Short breath; Rash)  IV Contrast (Anaphylaxis)  losartan (Angioedema)  Risperdal (Other)  verapamil (Short breath; Angioedema)    MEDICATIONS:  MEDICATIONS  (STANDING):  ascorbic acid 500 milliGRAM(s) Oral daily  aspirin  chewable 81 milliGRAM(s) Oral daily  atorvastatin 20 milliGRAM(s) Oral at bedtime  budesonide 160 MICROgram(s)/formoterol 4.5 MICROgram(s) Inhaler 2 Puff(s) Inhalation two times a day  chlorhexidine 0.12% Liquid 15 milliLiter(s) Oral Mucosa every 12 hours  clopidogrel Tablet 75 milliGRAM(s) Oral daily  enoxaparin Injectable 80 milliGRAM(s) SubCutaneous two times a day  gabapentin 300 milliGRAM(s) Oral three times a day  lamoTRIgine 100 milliGRAM(s) Oral daily  LORazepam     Tablet 0.5 milliGRAM(s) Oral two times a day  metoprolol tartrate 100 milliGRAM(s) Oral every 12 hours  montelukast 10 milliGRAM(s) Oral daily  multivitamin 1 Tablet(s) Oral daily  pantoprazole    Tablet 40 milliGRAM(s) Oral before breakfast  polyethylene glycol 3350 17 Gram(s) Oral daily  QUEtiapine 200 milliGRAM(s) Oral at bedtime  senna 2 Tablet(s) Oral at bedtime  tiotropium 18 MICROgram(s) Capsule 1 Capsule(s) Inhalation daily    MEDICATIONS  (PRN):  acetaminophen  Suppository .. 650 milliGRAM(s) Rectal every 6 hours PRN Temp greater or equal to 38C (100.4F)  ALBUTerol    90 MICROgram(s) HFA Inhaler 1 Puff(s) Inhalation every 4 hours PRN Shortness of Breath  aluminum hydroxide/magnesium hydroxide/simethicone Suspension 30 milliLiter(s) Oral every 4 hours PRN Dyspepsia  guaifenesin/dextromethorphan  Syrup 10 milliLiter(s) Oral every 6 hours PRN Cough      VITAL SIGNS: Last 24 Hours  Vital Signs Last 24 Hrs  T(C): 36 (05 Jan 2021 07:58), Max: 37.2 (04 Jan 2021 15:54)  T(F): 96.8 (05 Jan 2021 07:58), Max: 99 (04 Jan 2021 15:54)  HR: 82 (05 Jan 2021 07:58) (82 - 98)  BP: 116/55 (05 Jan 2021 07:58) (96/52 - 138/68)  BP(mean): 81 (05 Jan 2021 06:00) (70 - 81)  RR: 20 (05 Jan 2021 07:58) (18 - 36)  SpO2: 94% (05 Jan 2021 09:30) (90% - 95%)    LABS:                                  8.2    3.74  )-----------( 239      ( 05 Jan 2021 03:00 )             26.9                 8.1    3.48  )-----------( 227      ( 04 Jan 2021 05:20 )             26.9     01-05    145  |  107  |  18  ----------------------------<  93  3.5   |  30  |  0.6<L>    Ca    8.6      05 Jan 2021 03:00  Phos  3.2     01-05  Mg     1.9     01-05    TPro  5.6<L>  /  Alb  3.0<L>  /  TBili  0.5  /  DBili  x   /  AST  70<H>  /  ALT  38  /  AlkPhos  97  01-05    01-04    145  |  110  |  18  ----------------------------<  90  3.5   |  27  |  0.7    Ca    8.5      04 Jan 2021 05:20  Phos  2.8     01-04  Mg     1.9     01-04    TPro  5.2<L>  /  Alb  2.9<L>  /  TBili  0.4  /  DBili  <0.2  /  AST  94<H>  /  ALT  39  /  AlkPhos  93  01-04        ABG - ( 04 Jan 2021 03:31 )  pH, Arterial: 7.36  pH, Blood: x     /  pCO2: 52    /  pO2: 134   / HCO3: 29    / Base Excess: 3.1   /  SaO2: 99                Lactate, Blood: 0.8 mmol/L (01-03-21 @ 20:49)          RADIOLOGY:      CT Head No Cont (12.26.20 @ 06:38) >    IMPRESSION:    No CT evidence of acute territorial infarct or other acute intracranial pathology.       SHAUN COATES 75y Female  MRN#: [id number]   CODE STATUS: Full code      SUBJECTIVE  Patient is a 75y old Female who presents with a chief complaint of chest pain weakness (04 Jan 2021 06:57)  Currently admitted to medicine with the primary diagnosis of Septic shock    no overnight complaints, patient appears much more alert, has shaking/tremors on hand when grabbing cup, otherwise stable, will attempt to wean off from NRB    Attending Note: Pt seen and examined at bedside. No cp or sob. Pt stable on venti mask.  complaining of tremors in hands     OBJECTIVE  PAST MEDICAL & SURGICAL HISTORY  Falls    Congestive heart failure    Transient ischemic attack    FLAVIO on CPAP    Bipolar 1 disorder    Allergic reaction    PVD (peripheral vascular disease)    Other emphysema    Other cardiomyopathy    TIA (transient ischemic attack)    COPD (chronic obstructive pulmonary disease)    Depression    Hypertension    History of cholecystectomy      ALLERGIES:  codeine (Other (Moderate))  Depakote (Unknown)  Dilaudid (Short breath; Rash)  IV Contrast (Anaphylaxis)  losartan (Angioedema)  Risperdal (Other)  verapamil (Short breath; Angioedema)    MEDICATIONS:  MEDICATIONS  (STANDING):  ascorbic acid 500 milliGRAM(s) Oral daily  aspirin  chewable 81 milliGRAM(s) Oral daily  atorvastatin 20 milliGRAM(s) Oral at bedtime  budesonide 160 MICROgram(s)/formoterol 4.5 MICROgram(s) Inhaler 2 Puff(s) Inhalation two times a day  chlorhexidine 0.12% Liquid 15 milliLiter(s) Oral Mucosa every 12 hours  clopidogrel Tablet 75 milliGRAM(s) Oral daily  enoxaparin Injectable 80 milliGRAM(s) SubCutaneous two times a day  gabapentin 300 milliGRAM(s) Oral three times a day  lamoTRIgine 100 milliGRAM(s) Oral daily  LORazepam     Tablet 0.5 milliGRAM(s) Oral two times a day  metoprolol tartrate 100 milliGRAM(s) Oral every 12 hours  montelukast 10 milliGRAM(s) Oral daily  multivitamin 1 Tablet(s) Oral daily  pantoprazole    Tablet 40 milliGRAM(s) Oral before breakfast  polyethylene glycol 3350 17 Gram(s) Oral daily  QUEtiapine 200 milliGRAM(s) Oral at bedtime  senna 2 Tablet(s) Oral at bedtime  tiotropium 18 MICROgram(s) Capsule 1 Capsule(s) Inhalation daily    MEDICATIONS  (PRN):  acetaminophen  Suppository .. 650 milliGRAM(s) Rectal every 6 hours PRN Temp greater or equal to 38C (100.4F)  ALBUTerol    90 MICROgram(s) HFA Inhaler 1 Puff(s) Inhalation every 4 hours PRN Shortness of Breath  aluminum hydroxide/magnesium hydroxide/simethicone Suspension 30 milliLiter(s) Oral every 4 hours PRN Dyspepsia  guaifenesin/dextromethorphan  Syrup 10 milliLiter(s) Oral every 6 hours PRN Cough      VITAL SIGNS: Last 24 Hours  Vital Signs Last 24 Hrs  T(C): 36 (05 Jan 2021 07:58), Max: 37.2 (04 Jan 2021 15:54)  T(F): 96.8 (05 Jan 2021 07:58), Max: 99 (04 Jan 2021 15:54)  HR: 82 (05 Jan 2021 07:58) (82 - 98)  BP: 116/55 (05 Jan 2021 07:58) (96/52 - 138/68)  BP(mean): 81 (05 Jan 2021 06:00) (70 - 81)  RR: 20 (05 Jan 2021 07:58) (18 - 36)  SpO2: 94% (05 Jan 2021 09:30) (90% - 95%)    LABS:                                  8.2    3.74  )-----------( 239      ( 05 Jan 2021 03:00 )             26.9                 8.1    3.48  )-----------( 227      ( 04 Jan 2021 05:20 )             26.9     01-05    145  |  107  |  18  ----------------------------<  93  3.5   |  30  |  0.6<L>    Ca    8.6      05 Jan 2021 03:00  Phos  3.2     01-05  Mg     1.9     01-05    TPro  5.6<L>  /  Alb  3.0<L>  /  TBili  0.5  /  DBili  x   /  AST  70<H>  /  ALT  38  /  AlkPhos  97  01-05    01-04    145  |  110  |  18  ----------------------------<  90  3.5   |  27  |  0.7    Ca    8.5      04 Jan 2021 05:20  Phos  2.8     01-04  Mg     1.9     01-04    TPro  5.2<L>  /  Alb  2.9<L>  /  TBili  0.4  /  DBili  <0.2  /  AST  94<H>  /  ALT  39  /  AlkPhos  93  01-04        ABG - ( 04 Jan 2021 03:31 )  pH, Arterial: 7.36  pH, Blood: x     /  pCO2: 52    /  pO2: 134   / HCO3: 29    / Base Excess: 3.1   /  SaO2: 99                Lactate, Blood: 0.8 mmol/L (01-03-21 @ 20:49)          RADIOLOGY:      CT Head No Cont (12.26.20 @ 06:38) >    IMPRESSION:    No CT evidence of acute territorial infarct or other acute intracranial pathology.

## 2021-01-05 NOTE — BEHAVIORAL HEALTH ASSESSMENT NOTE - SUMMARY
SHAUN COATES is a 75y old  Female,  with 2 children, living at home with her , with a psychiatric history of bipolar disorder, 1 prior IPP admission, admitted for COVID pneumonia. Psychiatry was consulted for management of anxiety and agitation.    On examination, patient does not appear acutely psychiatrically decompensated at this time, and her bipolar disorder appears to be stable on her outpatient regimen. Patient does not appear acutely anxious and is denying any significant anxiety at this time. Patient not suicidal, manic, psychotic, or depressed at this time. She is already engaged in outpatient psychiatric care. Episode of self-extubation appears to have been most likely in setting of acute confusion or delirium in context of hypoxia acute illness rather than any form of psychiatric decompensation.      Recommendations:  -no psychiatric indication for IPP or 1:1 at this time  -continue outpatient regimen (seroquel 200mg qhs, lamictal 100mg daily, ativan 0.5 mg q12h prn)  -may add seroquel 50 mg q8h prn for breakthrough anxiety while hospitalized  -For severe agitation not responding to behavioral intervention, may give haldol 3 mg po q8h prn, with escalation to IM if patient refusing PO and remains an imminent danger to self or others. If IM antipsychotic is administered, please perform follow-up ECG for QTc monitoring.  -patient to f/u with existing outpatient psychiatrist SHAUN COATES is a 75y old  Female,  with 2 children, living at home with her , with a psychiatric history of bipolar disorder, 1 prior IPP admission, admitted for COVID pneumonia. Psychiatry was consulted for management of anxiety and agitation.    On examination, patient does not appear acutely psychiatrically decompensated at this time, and her bipolar disorder appears to be stable on her outpatient regimen. Patient does not appear acutely anxious and is denying any significant anxiety at this time. Patient not suicidal, manic, psychotic, or depressed at this time. She is already engaged in outpatient psychiatric care. Episode of self-extubation appears to have been most likely in setting of acute confusion or delirium in context of hypoxia acute illness rather than any form of psychiatric decompensation.      Recommendations:  -no psychiatric indication for IPP or 1:1 at this time  -continue outpatient regimen (Seroquel 200mg qhs, Lamictal 100mg daily, ativan 0.5 mg q12h prn)  -may add Seroquel 50 mg q8h prn for breakthrough anxiety while hospitalized  -For severe agitation not responding to behavioral intervention, may give haldol 3 mg po q8h prn, with escalation to IM if patient refusing PO and remains an imminent danger to self or others. If IM antipsychotic is administered, please perform follow-up ECG for QTc monitoring. If QTC is prolonged >500ms, please hold all psychotropic medications.   -patient to f/u with existing outpatient psychiatrist

## 2021-01-05 NOTE — BEHAVIORAL HEALTH ASSESSMENT NOTE - NSBHCHARTREVIEWIMAGING_PSY_A_CORE FT
< from: Xray Chest 1 View- PORTABLE-Routine (Xray Chest 1 View- PORTABLE-Routine in AM.) (01.04.21 @ 05:40) >      EXAM:  XR CHEST PORTABLE ROUTINE 1V            PROCEDURE DATE:  01/04/2021            INTERPRETATION:  STUDY INDICATION: Covid pneumonia.    Comparison: XR CHEST 1/3/2021.    Technique/Positioning: Frontal view of the chest.    Findings:    Supportdevices: Unchanged left IJ venous catheter. Overlying telemetry leads.    Cardiac/mediastinum/hilum: Stable cardiomediastinal silhouette.    Lung parenchyma/Pleura: Bilateral diffuse opacities have overall decreased since the prior exam. No pneumothorax.    Skeleton/soft tissues: Unchanged.    Impression:    Bilateral diffuse opacities have overall decreased since the prior exam. No pneumothorax.    CHARITY GARCIA MD; Attending Radiologist  This document has been electronically signed.Jan 4 2021  2:22PM    < end of copied text >

## 2021-01-05 NOTE — PROGRESS NOTE ADULT - SUBJECTIVE AND OBJECTIVE BOX
SHAUN COATES  75y, Female  Allergy: codeine (Other (Moderate))  Depakote (Unknown)  Dilaudid (Short breath; Rash)  IV Contrast (Anaphylaxis)  losartan (Angioedema)  Risperdal (Other)  verapamil (Short breath; Angioedema)      LOS  13d    CHIEF COMPLAINT: chest pain weakness (05 Jan 2021 07:30)      INTERVAL EVENTS/HPI  - extubated on venturi  - T(F): , Max: 99 (01-04-21 @ 15:54)  - WBC Count: 3.74 (01-05-21 @ 03:00)  WBC Count: 3.48 (01-04-21 @ 05:20)  - Creatinine, Serum: 0.6 (01-05-21 @ 03:00)  Creatinine, Serum: 0.7 (01-04-21 @ 05:20)           VITALS:  T(F): 96.8, Max: 99 (01-04-21 @ 15:54)  HR: 82  BP: 116/55  RR: 20Vital Signs Last 24 Hrs  T(C): 36 (05 Jan 2021 07:58), Max: 37.2 (04 Jan 2021 15:54)  T(F): 96.8 (05 Jan 2021 07:58), Max: 99 (04 Jan 2021 15:54)  HR: 82 (05 Jan 2021 07:58) (82 - 98)  BP: 116/55 (05 Jan 2021 07:58) (96/52 - 138/68)  BP(mean): 81 (05 Jan 2021 06:00) (70 - 81)  RR: 20 (05 Jan 2021 07:58) (18 - 36)  SpO2: 94% (05 Jan 2021 09:30) (90% - 95%)    FH: Non-contributory  Social Hx: Non-contributory    TESTS & MEASUREMENTS:                        8.2    3.74  )-----------( 239      ( 05 Jan 2021 03:00 )             26.9     01-05    145  |  107  |  18  ----------------------------<  93  3.5   |  30  |  0.6<L>    Ca    8.6      05 Jan 2021 03:00  Phos  3.2     01-05  Mg     1.9     01-05    TPro  5.6<L>  /  Alb  3.0<L>  /  TBili  0.5  /  DBili  x   /  AST  70<H>  /  ALT  38  /  AlkPhos  97  01-05    eGFR if Non African American: 90 mL/min/1.73M2 (01-05-21 @ 03:00)  eGFR if African American: 105 mL/min/1.73M2 (01-05-21 @ 03:00)    LIVER FUNCTIONS - ( 05 Jan 2021 03:00 )  Alb: 3.0 g/dL / Pro: 5.6 g/dL / ALK PHOS: 97 U/L / ALT: 38 U/L / AST: 70 U/L / GGT: x               Culture - Blood (collected 01-01-21 @ 07:24)  Source: .Blood None  Preliminary Report (01-02-21 @ 12:01):    No growth to date.    Culture - Blood (collected 12-31-20 @ 11:42)  Source: .Blood None  Preliminary Report (01-01-21 @ 22:02):    No growth to date.    Culture - Sputum (collected 12-30-20 @ 12:00)  Source: .Sputum Deep Tracheal Aspirate  Gram Stain (12-31-20 @ 12:46):    Few polymorphonuclear leukocytes per low power field    Few Squamous epithelial cells per low power field    Few Gram Positive Cocci in Pairs and Chains per oil power field  Final Report (01-02-21 @ 11:26):    Normal Respiratory Beronica present    Culture - Blood (collected 12-29-20 @ 16:45)  Source: .Blood None  Final Report (01-04-21 @ 01:00):    No Growth Final    Culture - Urine (collected 12-29-20 @ 15:21)  Source: .Urine Clean Catch (Midstream)  Final Report (12-30-20 @ 20:53):    No growth    Culture - Blood (collected 12-25-20 @ 11:27)  Source: .Blood None  Final Report (12-30-20 @ 16:01):    No Growth Final    Culture - Blood (collected 12-24-20 @ 22:53)  Source: .Blood Blood-Venous  Final Report (12-30-20 @ 13:01):    No Growth Final    Culture - Urine (collected 12-23-20 @ 00:07)  Source: .Urine Clean Catch (Midstream)  Final Report (12-24-20 @ 02:06):    <10,000 CFU/mL Normal Urogenital Beronica    Culture - Blood (collected 12-22-20 @ 21:53)  Source: .Blood Blood-Peripheral  Gram Stain (12-24-20 @ 03:50):    Growth in aerobic bottle: Gram Positive Cocci in Clusters  Final Report (12-25-20 @ 09:40):    Growth in anaerobic bottle: Staphylococcus capitis    Coag Negative Staphylococcus    Single set isolate, possible contaminant. Contact    Microbiology if susceptibility testing clinically    indicated.    ***Blood Panel PCR results on this specimen are available    approximately 3 hours after the Gram stain result.***    Gram stain, PCR, and/or culture results may not always    correspond due to difference in methodologies.    ************************************************************    This PCR assay was performed using [email or web address].    The following targets are tested for: Enterococcus,    vancomycin resistant enterococci, Listeria monocytogenes,    coagulase negative staphylococci, S. aureus,    methicillin resistant S. aureus, Streptococcus agalactiae    (Group B), S. pneumoniae, S. pyogenes (Group A),    Acinetobacter baumannii, Enterobacter cloacae, E. coli,    Klebsiella oxytoca, K. pneumoniae, Proteus sp.,    Serratia marcescens, Haemophilus influenzae,    Neisseria meningitidis, Pseudomonas aeruginosa, Candida    albicans, C. glabrata, C krusei, C parapsilosis,    C. tropicalis and the KPC resistance gene.    "Due to technical problems, Proteus sp. and Pseudomonas    aeruginosa will Not be reported as part of the BCID panel    until further notice".  Organism: Blood Culture PCR (12-25-20 @ 09:40)  Organism: Blood Culture PCR (12-25-20 @ 09:40)      -  Coagulase negative Staphylococcus: Detec      Method Type: PCR    Culture - Blood (collected 12-22-20 @ 21:53)  Source: .Blood Blood-Peripheral  Final Report (12-28-20 @ 07:00):    No Growth Final    Culture - Blood (collected 12-07-20 @ 11:28)  Source: .Blood Blood-Peripheral  Final Report (12-12-20 @ 23:01):    No Growth Final        Lactate, Blood: 0.8 mmol/L (01-03-21 @ 20:49)      INFECTIOUS DISEASES TESTING  Procalcitonin, Serum: 6.12 (12-30-20 @ 04:30)  Procalcitonin, Serum: 0.31 (12-29-20 @ 21:31)  Procalcitonin, Serum: 0.33 (12-28-20 @ 16:00)  MRSA PCR Result.: Negative (12-28-20 @ 14:33)  Vancomycin Level, Trough: 11.4 (12-28-20 @ 10:40)  Procalcitonin, Serum: 1.09 (12-25-20 @ 06:34)  Procalcitonin, Serum: 0.31 (12-24-20 @ 22:52)  COVID-19 PCR: Detected (12-23-20 @ 04:50)  Procalcitonin, Serum: 0.84 (12-22-20 @ 21:56)  Procalcitonin, Serum: 0.10 (12-11-20 @ 08:14)  Vancomycin Level, Trough: 16.2 (12-08-20 @ 16:29)  COVID-19 PCR: Detected (12-07-20 @ 12:30)  Procalcitonin, Serum: 0.09 (12-07-20 @ 10:05)  Procalcitonin, Serum: 0.09 (11-29-20 @ 21:30)  COVID-19 PCR: NotDetec (11-29-20 @ 13:30)  Procalcitonin, Serum: 0.10 (11-29-20 @ 13:15)  Rapid RVP Result: NotDetec (10-14-20 @ 18:06)      INFLAMMATORY MARKERS  C-Reactive Protein, Serum: 9.86 mg/dL (12-30-20 @ 04:30)  C-Reactive Protein, Serum: 9.57 mg/dL (12-23-20 @ 07:33)  C-Reactive Protein, Serum: 2.39 mg/dL (12-11-20 @ 08:14)  C-Reactive Protein, Serum: 6.71 mg/dL (12-09-20 @ 05:37)      RADIOLOGY & ADDITIONAL TESTS:  I have personally reviewed the last available Chest xray  CXR      CT      CARDIOLOGY TESTING  12 Lead ECG:   Ventricular Rate 176 BPM    Atrial Rate 182 BPM    QRS Duration 84 ms    Q-T Interval 264 ms    QTC Calculation(Bazett) 451 ms    R Axis 25 degrees    T Axis 212 degrees    Diagnosis Line Atrial fibrillation with rapid ventricular response  Marked ST abnormality, possible inferior subendocardial injury  Abnormal ECG    Confirmed by Scott Cowan ([id number]) on 12/27/2020 12:59:53 PM (12-27-20 @ 05:38)  12 Lead ECG:   Ventricular Rate 131 BPM    Atrial Rate 156 BPM    QRS Duration 90 ms    Q-T Interval 322 ms    QTC Calculation(Bazett) 475 ms    R Axis 29 degrees    T Axis 17 degrees    Diagnosis Line Atrial fibrillation with rapid ventricular response  Nonspecific ST and T wave abnormality  Abnormal ECG    Confirmed by YANN MATTSON MD ([id number]) on 12/28/2020 12:50:38 PM (12-25-20 @ 08:30)      MEDICATIONS  ascorbic acid 500 Oral daily  aspirin  chewable 81 Oral daily  atorvastatin 20 Oral at bedtime  budesonide 160 MICROgram(s)/formoterol 4.5 MICROgram(s) Inhaler 2 Inhalation two times a day  chlorhexidine 0.12% Liquid 15 Oral Mucosa every 12 hours  clopidogrel Tablet 75 Oral daily  enoxaparin Injectable 80 SubCutaneous two times a day  gabapentin 300 Oral three times a day  lamoTRIgine 100 Oral daily  LORazepam     Tablet 0.5 Oral two times a day  metoprolol tartrate 100 Oral every 12 hours  montelukast 10 Oral daily  multivitamin 1 Oral daily  pantoprazole    Tablet 40 Oral before breakfast  polyethylene glycol 3350 17 Oral daily  QUEtiapine 200 Oral at bedtime  senna 2 Oral at bedtime  tiotropium 18 MICROgram(s) Capsule 1 Inhalation daily      WEIGHT  Weight (kg): 82.599 (12-23-20 @ 04:25)  Creatinine, Serum: 0.6 mg/dL (01-05-21 @ 03:00)      ANTIBIOTICS:      All available historical records have been reviewed

## 2021-01-05 NOTE — BEHAVIORAL HEALTH ASSESSMENT NOTE - NSBHCHARTREVIEWVS_PSY_A_CORE FT
Vital Signs Last 24 Hrs  T(C): 36 (05 Jan 2021 07:58), Max: 37.2 (04 Jan 2021 15:54)  T(F): 96.8 (05 Jan 2021 07:58), Max: 99 (04 Jan 2021 15:54)  HR: 82 (05 Jan 2021 07:58) (82 - 98)  BP: 116/55 (05 Jan 2021 07:58) (96/52 - 138/68)  BP(mean): 81 (05 Jan 2021 06:00) (70 - 81)  RR: 20 (05 Jan 2021 07:58) (18 - 30)  SpO2: 94% (05 Jan 2021 09:30) (91% - 95%)

## 2021-01-05 NOTE — BEHAVIORAL HEALTH ASSESSMENT NOTE - CASE SUMMARY
No overt manic symptoms, indicative for exacerbation of bipolar. Current presentation for this patient appears to be in the context of delirium. Would not recommend to change patient's standing home dose psychotropic medication. Consider using recommended prn meds above if patient becomes agitated placing herself or others at harm due to episode of agitation related to delirium.

## 2021-01-05 NOTE — BEHAVIORAL HEALTH ASSESSMENT NOTE - RISK ASSESSMENT
Patient's risk of self-harm is mitigated by her lack of suicidal ideation or mood symptoms, future orientation, and supportive family Low Acute Suicide Risk

## 2021-01-05 NOTE — BEHAVIORAL HEALTH ASSESSMENT NOTE - DESCRIPTION
COPD on 5L home oxygen, paroxysmal afib on pradaxa, carotid stenosis (L 80%, R 40%) s/p L carotid stenting recently , HFpEF (EF 70% Dec 2020), HTN, HLD, bipolar disorder, left parotid mass,

## 2021-01-05 NOTE — PROGRESS NOTE ADULT - SUBJECTIVE AND OBJECTIVE BOX
OVERNIGHT EVENTS: events noted, afebrile on FM    Vital Signs Last 24 Hrs  T(C): 36.9 (05 Jan 2021 15:37), Max: 36.9 (05 Jan 2021 15:37)  T(F): 98.4 (05 Jan 2021 15:37), Max: 98.4 (05 Jan 2021 15:37)  HR: 93 (05 Jan 2021 15:37) (82 - 98)  BP: 160/74 (05 Jan 2021 15:37) (96/52 - 160/74)  BP(mean): 86 (05 Jan 2021 12:00) (70 - 86)  RR: 20 (05 Jan 2021 15:37) (18 - 28)  SpO2: 97% (05 Jan 2021 18:16) (90% - 98%)    PHYSICAL EXAMINATION:    GENERAL: ILL LOOKING    HEENT: Head is normocephalic and atraumatic.     NECK: Supple.    LUNGS: bl crackles    HEART: MARCELINO 3.6    ABDOMEN: Soft, nontender, and nondistended.      EXTREMITIES: Without any cyanosis, clubbing, rash, lesions or edema.    NEUROLOGIC: Grossly intact.    SKIN: No ulceration or induration present.      LABS:                        8.2    3.74  )-----------( 239      ( 05 Jan 2021 03:00 )             26.9     01-05    145  |  107  |  18  ----------------------------<  93  3.5   |  30  |  0.6<L>    Ca    8.6      05 Jan 2021 03:00  Phos  3.2     01-05  Mg     1.9     01-05    TPro  5.6<L>  /  Alb  3.0<L>  /  TBili  0.5  /  DBili  x   /  AST  70<H>  /  ALT  38  /  AlkPhos  97  01-05        ABG - ( 04 Jan 2021 03:31 )  pH, Arterial: 7.36  pH, Blood: x     /  pCO2: 52    /  pO2: 134   / HCO3: 29    / Base Excess: 3.1   /  SaO2: 99                    D-Dimer Assay, Quantitative: 987 ng/mL DDU (01-05-21 @ 03:00)              01-04-21 @ 07:01  -  01-05-21 @ 07:00  --------------------------------------------------------  IN: 110 mL / OUT: 3050 mL / NET: -2940 mL    01-05-21 @ 07:01  -  01-05-21 @ 19:52  --------------------------------------------------------  IN: 100 mL / OUT: 147 mL / NET: -47 mL        MICROBIOLOGY:      MEDICATIONS  (STANDING):  ascorbic acid 500 milliGRAM(s) Oral daily  aspirin  chewable 81 milliGRAM(s) Oral daily  atorvastatin 20 milliGRAM(s) Oral at bedtime  budesonide 160 MICROgram(s)/formoterol 4.5 MICROgram(s) Inhaler 2 Puff(s) Inhalation two times a day  chlorhexidine 0.12% Liquid 15 milliLiter(s) Oral Mucosa every 12 hours  clopidogrel Tablet 75 milliGRAM(s) Oral daily  enoxaparin Injectable 80 milliGRAM(s) SubCutaneous two times a day  gabapentin 300 milliGRAM(s) Oral three times a day  lamoTRIgine 100 milliGRAM(s) Oral daily  LORazepam     Tablet 0.5 milliGRAM(s) Oral two times a day  metoprolol tartrate 100 milliGRAM(s) Oral every 12 hours  montelukast 10 milliGRAM(s) Oral daily  multivitamin 1 Tablet(s) Oral daily  pantoprazole    Tablet 40 milliGRAM(s) Oral before breakfast  polyethylene glycol 3350 17 Gram(s) Oral daily  QUEtiapine 200 milliGRAM(s) Oral at bedtime  senna 2 Tablet(s) Oral at bedtime  tiotropium 18 MICROgram(s) Capsule 1 Capsule(s) Inhalation daily    MEDICATIONS  (PRN):  acetaminophen  Suppository .. 650 milliGRAM(s) Rectal every 6 hours PRN Temp greater or equal to 38C (100.4F)  ALBUTerol    90 MICROgram(s) HFA Inhaler 1 Puff(s) Inhalation every 4 hours PRN Shortness of Breath  aluminum hydroxide/magnesium hydroxide/simethicone Suspension 30 milliLiter(s) Oral every 4 hours PRN Dyspepsia  guaifenesin/dextromethorphan  Syrup 10 milliLiter(s) Oral every 6 hours PRN Cough      RADIOLOGY & ADDITIONAL STUDIES:

## 2021-01-05 NOTE — BEHAVIORAL HEALTH ASSESSMENT NOTE - HPI (INCLUDE ILLNESS QUALITY, SEVERITY, DURATION, TIMING, CONTEXT, MODIFYING FACTORS, ASSOCIATED SIGNS AND SYMPTOMS)
SHAUN COATES is a 75y old  Female,  with 2 children, living at home with her , with a psychiatric history of bipolar disorder, 1 prior IPP admission, admitted for COVID pneumonia. Psychiatry was consulted for management of anxiety and agitation.    Upon approach, patient was sitting up in bed comfortably, calm and cooperative with interview. Patient reports feeling well at present, denies any current complaints. Reports that she had a "tube in her throat, apparently" but is doing better now. Reports that she feels she is improving and that she looks forward to being able to go back home, talking and walking better, and getting her nails and her hair done. Reports she feels well-supported by family. Reports that she sees outpatient psychiatrist monthly as well as a therapist, has been on current regimen for a long period of time, with the last time she had a reported manic or depressive episode being approximately 20 years ago. Denies any substance use. Denies any feelings of anxiety at present, feels staff is treating her well, feels well taken care of. Reports current mood as "fine", denies any anhedonia. Denies any symptoms suggestive of johnson or psychosis. Denies SI or HI. Denies any auditory or visual hallucinations.

## 2021-01-05 NOTE — PROGRESS NOTE ADULT - ASSESSMENT
ASSESSMENT  75y F with COPD on 5L home O2, paroxysmal afib, carotid stenosis, HFpEF, HTN, HLD, Bipolar disorder, recent admission D/C 12/11 s/p left TCAR, right groin access  found to be COVID-19 PNA s/p RDV, plasma now admitted with CP     IMPRESSION  #Severe COVID-19 PNA with septic shock requiring pressors     12/30 tracheal aspirate    Procalcitonin, Serum: 0.84 ng/mL (12-22-20 @ 21:56) --> Procalcitonin, Serum: 6.12 (12-30-20 @ 04:30)    CXR bilateral opacities , RLL opacity    UA unremarkable UCX NG    COVID-19 PCR: Detected (12-07-20 @ 12:30)   D-Dimer Assay, Quantitative: 2769 ng/mL DDU (12-30-20 @ 04:30)  D-Dimer Assay, Quantitative: 3285 ng/mL DDU (12-29-20 @ 17:07)  Ferritin, Serum: 997 ng/mL (12-28-20 @ 16:00)  #CoNS bacteremia , contaminant     Repeat BCX NG  #JOYCE  #Lactic acidosis  #Obesity BMI (kg/m2): 32.3, 34      RECOMMENDATIONS  - off abx (s/p 12 days cefepime)  - s/p 12/30 Tocilizumab 400mg x1      If any questions, please call or send a message on [employer]  Spectra [phone]

## 2021-01-05 NOTE — BEHAVIORAL HEALTH ASSESSMENT NOTE - NSBHCHARTREVIEWINVESTIGATE_PSY_A_CORE FT
< from: 12 Lead ECG (12.27.20 @ 05:38) >      Ventricular Rate 176 BPM    Atrial Rate 182 BPM    QRS Duration 84 ms    Q-T Interval 264 ms    QTC Calculation(Bazett) 451 ms    R Axis 25 degrees    T Axis 212 degrees    Diagnosis Line Atrial fibrillation with rapid ventricular response  Marked ST abnormality, possible inferior subendocardial injury  Abnormal ECG    Confirmed by Scott Cowan ([id number]) on 12/27/2020 12:59:53 PM    < end of copied text >

## 2021-01-05 NOTE — BEHAVIORAL HEALTH ASSESSMENT NOTE - NSBHCHARTREVIEWLAB_PSY_A_CORE FT
8.2    3.74  )-----------( 239      ( 05 Jan 2021 03:00 )             26.9     01-05    145  |  107  |  18  ----------------------------<  93  3.5   |  30  |  0.6<L>    Ca    8.6      05 Jan 2021 03:00  Phos  3.2     01-05  Mg     1.9     01-05    TPro  5.6<L>  /  Alb  3.0<L>  /  TBili  0.5  /  DBili  x   /  AST  70<H>  /  ALT  38  /  AlkPhos  97  01-05    LIVER FUNCTIONS - ( 05 Jan 2021 03:00 )  Alb: 3.0 g/dL / Pro: 5.6 g/dL / ALK PHOS: 97 U/L / ALT: 38 U/L / AST: 70 U/L / GGT: x

## 2021-01-05 NOTE — BEHAVIORAL HEALTH ASSESSMENT NOTE - NSBHMEDSOTHERFT_PSY_A_CORE
amLODIPine 2.5 mg oral tablet: 1 tab(s) orally once a day (29 Nov 2020 16:46)  aspirin 81 mg oral tablet: 1 tab(s) orally once a day (29 Nov 2020 16:46)  atorvastatin 20 mg oral tablet: 1 tab(s) orally once a day (29 Nov 2020 16:46)  budesonide-formoterol 160 mcg-4.5 mcg/inh inhalation aerosol: 2 puff(s) inhaled 2 times a day (29 Nov 2020 16:46)  furosemide 40 mg oral tablet: 1 tab(s) orally once a day (29 Nov 2020 16:46)  lamoTRIgine 100 mg oral tablet, extended release: 1 tab(s) orally once a day (29 Nov 2020 16:46)  LORazepam 0.5 mg oral tablet: 1 tab(s) orally 2 times a day, As Needed (29 Nov 2020 16:46)  Metoprolol Tartrate 100 mg oral tablet: 1 tab(s) orally 2 times a day (29 Nov 2020 16:46)  montelukast 10 mg oral tablet: 1 tab(s) orally once a day (29 Nov 2020 16:46)  Pepcid 20 mg oral tablet: 1 tab(s) orally once a day (29 Nov 2020 16:46)  Plavix 75 mg oral tablet: 1 tab(s) orally once a day (29 Nov 2020 16:46)  QUEtiapine 200 mg oral tablet: 1 tab(s) orally once a day (at bedtime) (29 Nov 2020 16:46)  tiotropium 18 mcg inhalation capsule: 1 cap(s) inhaled once a day (02 Dec 2020 13:51)

## 2021-01-05 NOTE — PROGRESS NOTE ADULT - ATTENDING COMMENTS
#Progress Note Handoff  Pending (specify): improvement  Family discussion: dw family and agreed to plan   Disposition: Home___/SNF___/Other___/Unknown at this time_x__  Pt seen during COVID 19 Pandemic.

## 2021-01-06 LAB
ALBUMIN SERPL ELPH-MCNC: 2.9 G/DL — LOW (ref 3.5–5.2)
ALP SERPL-CCNC: 88 U/L — SIGNIFICANT CHANGE UP (ref 30–115)
ALT FLD-CCNC: 27 U/L — SIGNIFICANT CHANGE UP (ref 0–41)
ANION GAP SERPL CALC-SCNC: 7 MMOL/L — SIGNIFICANT CHANGE UP (ref 7–14)
AST SERPL-CCNC: 45 U/L — HIGH (ref 0–41)
BASOPHILS # BLD AUTO: 0.02 K/UL — SIGNIFICANT CHANGE UP (ref 0–0.2)
BASOPHILS NFR BLD AUTO: 0.6 % — SIGNIFICANT CHANGE UP (ref 0–1)
BILIRUB SERPL-MCNC: 0.6 MG/DL — SIGNIFICANT CHANGE UP (ref 0.2–1.2)
BUN SERPL-MCNC: 16 MG/DL — SIGNIFICANT CHANGE UP (ref 10–20)
CALCIUM SERPL-MCNC: 8.4 MG/DL — LOW (ref 8.5–10.1)
CHLORIDE SERPL-SCNC: 108 MMOL/L — SIGNIFICANT CHANGE UP (ref 98–110)
CO2 SERPL-SCNC: 31 MMOL/L — SIGNIFICANT CHANGE UP (ref 17–32)
CREAT SERPL-MCNC: 0.6 MG/DL — LOW (ref 0.7–1.5)
CULTURE RESULTS: SIGNIFICANT CHANGE UP
EOSINOPHIL # BLD AUTO: 0.24 K/UL — SIGNIFICANT CHANGE UP (ref 0–0.7)
EOSINOPHIL NFR BLD AUTO: 6.7 % — SIGNIFICANT CHANGE UP (ref 0–8)
GLUCOSE BLDC GLUCOMTR-MCNC: 125 MG/DL — HIGH (ref 70–99)
GLUCOSE BLDC GLUCOMTR-MCNC: 82 MG/DL — SIGNIFICANT CHANGE UP (ref 70–99)
GLUCOSE BLDC GLUCOMTR-MCNC: 95 MG/DL — SIGNIFICANT CHANGE UP (ref 70–99)
GLUCOSE SERPL-MCNC: 90 MG/DL — SIGNIFICANT CHANGE UP (ref 70–99)
HCT VFR BLD CALC: 27.6 % — LOW (ref 37–47)
HGB BLD-MCNC: 8.3 G/DL — LOW (ref 12–16)
IMM GRANULOCYTES NFR BLD AUTO: 0.3 % — SIGNIFICANT CHANGE UP (ref 0.1–0.3)
LYMPHOCYTES # BLD AUTO: 1.04 K/UL — LOW (ref 1.2–3.4)
LYMPHOCYTES # BLD AUTO: 28.9 % — SIGNIFICANT CHANGE UP (ref 20.5–51.1)
MAGNESIUM SERPL-MCNC: 2.1 MG/DL — SIGNIFICANT CHANGE UP (ref 1.8–2.4)
MCHC RBC-ENTMCNC: 30.1 G/DL — LOW (ref 32–37)
MCHC RBC-ENTMCNC: 30.4 PG — SIGNIFICANT CHANGE UP (ref 27–31)
MCV RBC AUTO: 101.1 FL — HIGH (ref 81–99)
MONOCYTES # BLD AUTO: 0.35 K/UL — SIGNIFICANT CHANGE UP (ref 0.1–0.6)
MONOCYTES NFR BLD AUTO: 9.7 % — HIGH (ref 1.7–9.3)
NEUTROPHILS # BLD AUTO: 1.94 K/UL — SIGNIFICANT CHANGE UP (ref 1.4–6.5)
NEUTROPHILS NFR BLD AUTO: 53.8 % — SIGNIFICANT CHANGE UP (ref 42.2–75.2)
NRBC # BLD: 0 /100 WBCS — SIGNIFICANT CHANGE UP (ref 0–0)
PHOSPHATE SERPL-MCNC: 2.8 MG/DL — SIGNIFICANT CHANGE UP (ref 2.1–4.9)
PLATELET # BLD AUTO: 249 K/UL — SIGNIFICANT CHANGE UP (ref 130–400)
POTASSIUM SERPL-MCNC: 3.3 MMOL/L — LOW (ref 3.5–5)
POTASSIUM SERPL-SCNC: 3.3 MMOL/L — LOW (ref 3.5–5)
PROT SERPL-MCNC: 5.2 G/DL — LOW (ref 6–8)
RBC # BLD: 2.73 M/UL — LOW (ref 4.2–5.4)
RBC # FLD: 16.2 % — HIGH (ref 11.5–14.5)
SODIUM SERPL-SCNC: 146 MMOL/L — SIGNIFICANT CHANGE UP (ref 135–146)
SPECIMEN SOURCE: SIGNIFICANT CHANGE UP
WBC # BLD: 3.6 K/UL — LOW (ref 4.8–10.8)
WBC # FLD AUTO: 3.6 K/UL — LOW (ref 4.8–10.8)

## 2021-01-06 PROCEDURE — 99232 SBSQ HOSP IP/OBS MODERATE 35: CPT | Mod: CS

## 2021-01-06 RX ORDER — PREGABALIN 225 MG/1
1000 CAPSULE ORAL DAILY
Refills: 0 | Status: DISCONTINUED | OUTPATIENT
Start: 2021-01-06 | End: 2021-01-09

## 2021-01-06 RX ORDER — FOLIC ACID 0.8 MG
1 TABLET ORAL DAILY
Refills: 0 | Status: DISCONTINUED | OUTPATIENT
Start: 2021-01-06 | End: 2021-01-09

## 2021-01-06 RX ORDER — POTASSIUM CHLORIDE 20 MEQ
40 PACKET (EA) ORAL ONCE
Refills: 0 | Status: COMPLETED | OUTPATIENT
Start: 2021-01-06 | End: 2021-01-06

## 2021-01-06 RX ADMIN — GABAPENTIN 300 MILLIGRAM(S): 400 CAPSULE ORAL at 21:18

## 2021-01-06 RX ADMIN — MONTELUKAST 10 MILLIGRAM(S): 4 TABLET, CHEWABLE ORAL at 11:58

## 2021-01-06 RX ADMIN — Medication 40 MILLIEQUIVALENT(S): at 11:57

## 2021-01-06 RX ADMIN — Medication 0.5 MILLIGRAM(S): at 05:19

## 2021-01-06 RX ADMIN — GABAPENTIN 300 MILLIGRAM(S): 400 CAPSULE ORAL at 13:12

## 2021-01-06 RX ADMIN — Medication 0.5 MILLIGRAM(S): at 17:23

## 2021-01-06 RX ADMIN — QUETIAPINE FUMARATE 200 MILLIGRAM(S): 200 TABLET, FILM COATED ORAL at 21:18

## 2021-01-06 RX ADMIN — CHLORHEXIDINE GLUCONATE 15 MILLILITER(S): 213 SOLUTION TOPICAL at 17:09

## 2021-01-06 RX ADMIN — Medication 30 MILLILITER(S): at 16:22

## 2021-01-06 RX ADMIN — CLOPIDOGREL BISULFATE 75 MILLIGRAM(S): 75 TABLET, FILM COATED ORAL at 11:58

## 2021-01-06 RX ADMIN — ENOXAPARIN SODIUM 80 MILLIGRAM(S): 100 INJECTION SUBCUTANEOUS at 17:09

## 2021-01-06 RX ADMIN — GABAPENTIN 300 MILLIGRAM(S): 400 CAPSULE ORAL at 05:15

## 2021-01-06 RX ADMIN — ATORVASTATIN CALCIUM 20 MILLIGRAM(S): 80 TABLET, FILM COATED ORAL at 21:18

## 2021-01-06 RX ADMIN — BUDESONIDE AND FORMOTEROL FUMARATE DIHYDRATE 2 PUFF(S): 160; 4.5 AEROSOL RESPIRATORY (INHALATION) at 20:17

## 2021-01-06 RX ADMIN — Medication 500 MILLIGRAM(S): at 11:59

## 2021-01-06 RX ADMIN — Medication 100 MILLIGRAM(S): at 05:15

## 2021-01-06 RX ADMIN — Medication 100 MILLIGRAM(S): at 17:09

## 2021-01-06 RX ADMIN — Medication 1 TABLET(S): at 11:58

## 2021-01-06 RX ADMIN — ENOXAPARIN SODIUM 80 MILLIGRAM(S): 100 INJECTION SUBCUTANEOUS at 05:15

## 2021-01-06 RX ADMIN — Medication 81 MILLIGRAM(S): at 11:58

## 2021-01-06 RX ADMIN — PREGABALIN 1000 MICROGRAM(S): 225 CAPSULE ORAL at 11:57

## 2021-01-06 RX ADMIN — SENNA PLUS 2 TABLET(S): 8.6 TABLET ORAL at 21:18

## 2021-01-06 RX ADMIN — PANTOPRAZOLE SODIUM 40 MILLIGRAM(S): 20 TABLET, DELAYED RELEASE ORAL at 09:01

## 2021-01-06 RX ADMIN — Medication 1 MILLIGRAM(S): at 11:58

## 2021-01-06 RX ADMIN — CHLORHEXIDINE GLUCONATE 15 MILLILITER(S): 213 SOLUTION TOPICAL at 05:20

## 2021-01-06 RX ADMIN — LAMOTRIGINE 100 MILLIGRAM(S): 25 TABLET, ORALLY DISINTEGRATING ORAL at 11:58

## 2021-01-06 NOTE — PROGRESS NOTE ADULT - ATTENDING COMMENTS
74 yo female with PMH of HTN, Paroxysmal AFIB, COPD on 5L, HFpEF, left carotid stent and bipolar disease was admitted for COVID-19 pneumonia. She was recently admitted on 12/7-12/11.      A/P:   Acute Chronic Hypoxic respiratory failure: Improved, now on nasal canula.   COVID Pneumonia  with septic Shock:   s/p intubation 12/29 and self extubation on 12/30  CXR 1/6 stable bilateral opacities.   She received Tocilizumab 12/30, Dexamethasone and Remdisivr previous admission.   Completed Cefepime for 12 days.

## 2021-01-06 NOTE — SWALLOW BEDSIDE ASSESSMENT ADULT - SLP GENERAL OBSERVATIONS
Pt received in bed awake and alert on O2 NC, reported fatigue
Pt received in bed awake and alert on venti mask, switched to O2 NC for po trials.
Pt received in bed awake, PT at b/s. O2 venti mask in palce, pt attempting to speak in sentences, decreased speech intelligibility 2' decreased breath support and dysphonia.

## 2021-01-06 NOTE — PROGRESS NOTE ADULT - SUBJECTIVE AND OBJECTIVE BOX
SHAUN COATES 75y Female  MRN#: [id number]   CODE STATUS: Full code      SUBJECTIVE  Patient is a 75y old Female who presents with a chief complaint of chest pain weakness (04 Jan 2021 06:57)  Currently admitted to medicine with the primary diagnosis of Septic shock    no overnight complaints, patient appears much more alert, has shaking/tremors on hand when grabbing cup, otherwise stable, will attempt to wean off from NRB      OBJECTIVE  PAST MEDICAL & SURGICAL HISTORY  Falls    Congestive heart failure    Transient ischemic attack    FLAVIO on CPAP    Bipolar 1 disorder    Allergic reaction    PVD (peripheral vascular disease)    Other emphysema    Other cardiomyopathy    TIA (transient ischemic attack)    COPD (chronic obstructive pulmonary disease)    Depression    Hypertension    History of cholecystectomy      ALLERGIES:  codeine (Other (Moderate))  Depakote (Unknown)  Dilaudid (Short breath; Rash)  IV Contrast (Anaphylaxis)  losartan (Angioedema)  Risperdal (Other)  verapamil (Short breath; Angioedema)    MEDICATIONS:  MEDICATIONS  (STANDING):  ascorbic acid 500 milliGRAM(s) Oral daily  aspirin  chewable 81 milliGRAM(s) Oral daily  atorvastatin 20 milliGRAM(s) Oral at bedtime  budesonide 160 MICROgram(s)/formoterol 4.5 MICROgram(s) Inhaler 2 Puff(s) Inhalation two times a day  chlorhexidine 0.12% Liquid 15 milliLiter(s) Oral Mucosa every 12 hours  clopidogrel Tablet 75 milliGRAM(s) Oral daily  enoxaparin Injectable 80 milliGRAM(s) SubCutaneous two times a day  gabapentin 300 milliGRAM(s) Oral three times a day  lamoTRIgine 100 milliGRAM(s) Oral daily  LORazepam     Tablet 0.5 milliGRAM(s) Oral two times a day  metoprolol tartrate 100 milliGRAM(s) Oral every 12 hours  montelukast 10 milliGRAM(s) Oral daily  multivitamin 1 Tablet(s) Oral daily  pantoprazole    Tablet 40 milliGRAM(s) Oral before breakfast  polyethylene glycol 3350 17 Gram(s) Oral daily  QUEtiapine 200 milliGRAM(s) Oral at bedtime  senna 2 Tablet(s) Oral at bedtime  tiotropium 18 MICROgram(s) Capsule 1 Capsule(s) Inhalation daily    MEDICATIONS  (PRN):  acetaminophen  Suppository .. 650 milliGRAM(s) Rectal every 6 hours PRN Temp greater or equal to 38C (100.4F)  ALBUTerol    90 MICROgram(s) HFA Inhaler 1 Puff(s) Inhalation every 4 hours PRN Shortness of Breath  aluminum hydroxide/magnesium hydroxide/simethicone Suspension 30 milliLiter(s) Oral every 4 hours PRN Dyspepsia  guaifenesin/dextromethorphan  Syrup 10 milliLiter(s) Oral every 6 hours PRN Cough      VITAL SIGNS: Last 24 Hours  Vital Signs Last 24 Hrs  T(C): 36 (05 Jan 2021 07:58), Max: 37.2 (04 Jan 2021 15:54)  T(F): 96.8 (05 Jan 2021 07:58), Max: 99 (04 Jan 2021 15:54)  HR: 82 (05 Jan 2021 07:58) (82 - 98)  BP: 116/55 (05 Jan 2021 07:58) (96/52 - 138/68)  BP(mean): 81 (05 Jan 2021 06:00) (70 - 81)  RR: 20 (05 Jan 2021 07:58) (18 - 36)  SpO2: 94% (05 Jan 2021 09:30) (90% - 95%)    LABS:                                  8.2    3.74  )-----------( 239      ( 05 Jan 2021 03:00 )             26.9                 8.1    3.48  )-----------( 227      ( 04 Jan 2021 05:20 )             26.9     01-05    145  |  107  |  18  ----------------------------<  93  3.5   |  30  |  0.6<L>    Ca    8.6      05 Jan 2021 03:00  Phos  3.2     01-05  Mg     1.9     01-05    TPro  5.6<L>  /  Alb  3.0<L>  /  TBili  0.5  /  DBili  x   /  AST  70<H>  /  ALT  38  /  AlkPhos  97  01-05    01-04    145  |  110  |  18  ----------------------------<  90  3.5   |  27  |  0.7    Ca    8.5      04 Jan 2021 05:20  Phos  2.8     01-04  Mg     1.9     01-04    TPro  5.2<L>  /  Alb  2.9<L>  /  TBili  0.4  /  DBili  <0.2  /  AST  94<H>  /  ALT  39  /  AlkPhos  93  01-04        ABG - ( 04 Jan 2021 03:31 )  pH, Arterial: 7.36  pH, Blood: x     /  pCO2: 52    /  pO2: 134   / HCO3: 29    / Base Excess: 3.1   /  SaO2: 99                Lactate, Blood: 0.8 mmol/L (01-03-21 @ 20:49)          RADIOLOGY:      CT Head No Cont (12.26.20 @ 06:38) >    IMPRESSION:    No CT evidence of acute territorial infarct or other acute intracranial pathology.       SHAUN COATES 75y Female  MRN#: [id number]   CODE STATUS: Full code      SUBJECTIVE  Patient is a 75y old Female who presents with a chief complaint of chest pain weakness (04 Jan 2021 06:57)  Currently admitted to medicine with the primary diagnosis of Septic shock    no overnight complaints, patient is on NC: 5L, saturating 95%, weaned it off from NRB      OBJECTIVE  PAST MEDICAL & SURGICAL HISTORY  Falls    Congestive heart failure    Transient ischemic attack    FLAVIO on CPAP    Bipolar 1 disorder    Allergic reaction    PVD (peripheral vascular disease)    Other emphysema    Other cardiomyopathy    TIA (transient ischemic attack)    COPD (chronic obstructive pulmonary disease)    Depression    Hypertension    History of cholecystectomy      ALLERGIES:  codeine (Other (Moderate))  Depakote (Unknown)  Dilaudid (Short breath; Rash)  IV Contrast (Anaphylaxis)  losartan (Angioedema)  Risperdal (Other)  verapamil (Short breath; Angioedema)    MEDICATIONS:  MEDICATIONS  (STANDING):  ascorbic acid 500 milliGRAM(s) Oral daily  aspirin  chewable 81 milliGRAM(s) Oral daily  atorvastatin 20 milliGRAM(s) Oral at bedtime  budesonide 160 MICROgram(s)/formoterol 4.5 MICROgram(s) Inhaler 2 Puff(s) Inhalation two times a day  chlorhexidine 0.12% Liquid 15 milliLiter(s) Oral Mucosa every 12 hours  clopidogrel Tablet 75 milliGRAM(s) Oral daily  enoxaparin Injectable 80 milliGRAM(s) SubCutaneous two times a day  gabapentin 300 milliGRAM(s) Oral three times a day  lamoTRIgine 100 milliGRAM(s) Oral daily  LORazepam     Tablet 0.5 milliGRAM(s) Oral two times a day  metoprolol tartrate 100 milliGRAM(s) Oral every 12 hours  montelukast 10 milliGRAM(s) Oral daily  multivitamin 1 Tablet(s) Oral daily  pantoprazole    Tablet 40 milliGRAM(s) Oral before breakfast  polyethylene glycol 3350 17 Gram(s) Oral daily  QUEtiapine 200 milliGRAM(s) Oral at bedtime  senna 2 Tablet(s) Oral at bedtime  tiotropium 18 MICROgram(s) Capsule 1 Capsule(s) Inhalation daily    MEDICATIONS  (PRN):  acetaminophen  Suppository .. 650 milliGRAM(s) Rectal every 6 hours PRN Temp greater or equal to 38C (100.4F)  ALBUTerol    90 MICROgram(s) HFA Inhaler 1 Puff(s) Inhalation every 4 hours PRN Shortness of Breath  aluminum hydroxide/magnesium hydroxide/simethicone Suspension 30 milliLiter(s) Oral every 4 hours PRN Dyspepsia  guaifenesin/dextromethorphan  Syrup 10 milliLiter(s) Oral every 6 hours PRN Cough        VITAL SIGNS: Last 24 Hours  Vital Signs Last 24 Hrs  T(C): 36.3 (06 Jan 2021 08:02), Max: 36.9 (05 Jan 2021 15:37)  T(F): 97.4 (06 Jan 2021 08:02), Max: 98.4 (05 Jan 2021 15:37)  HR: 71 (06 Jan 2021 08:02) (71 - 93)  BP: 115/78 (06 Jan 2021 08:02) (113/56 - 160/74)  BP(mean): 86 (05 Jan 2021 12:00) (86 - 86)  RR: 20 (06 Jan 2021 08:02) (20 - 20)  SpO2: 97% (06 Jan 2021 08:02) (90% - 99%)    LABS:                          8.3    3.60  )-----------( 249      ( 06 Jan 2021 04:30 )             27.6                                   8.2    3.74  )-----------( 239      ( 05 Jan 2021 03:00 )             26.9                 8.1    3.48  )-----------( 227      ( 04 Jan 2021 05:20 )             26.9     01-06    146  |  108  |  16  ----------------------------<  90  3.3<L>   |  31  |  0.6<L>    Ca    8.4<L>      06 Jan 2021 04:30  Phos  2.8     01-06  Mg     2.1     01-06    TPro  5.2<L>  /  Alb  2.9<L>  /  TBili  0.6  /  DBili  x   /  AST  45<H>  /  ALT  27  /  AlkPhos  88  01-06      01-05    145  |  107  |  18  ----------------------------<  93  3.5   |  30  |  0.6<L>    Ca    8.6      05 Jan 2021 03:00  Phos  3.2     01-05  Mg     1.9     01-05    TPro  5.6<L>  /  Alb  3.0<L>  /  TBili  0.5  /  DBili  x   /  AST  70<H>  /  ALT  38  /  AlkPhos  97  01-05 01-04    145  |  110  |  18  ----------------------------<  90  3.5   |  27  |  0.7    Ca    8.5      04 Jan 2021 05:20  Phos  2.8     01-04  Mg     1.9     01-04    TPro  5.2<L>  /  Alb  2.9<L>  /  TBili  0.4  /  DBili  <0.2  /  AST  94<H>  /  ALT  39  /  AlkPhos  93  01-04        ABG - ( 04 Jan 2021 03:31 )  pH, Arterial: 7.36  pH, Blood: x     /  pCO2: 52    /  pO2: 134   / HCO3: 29    / Base Excess: 3.1   /  SaO2: 99                Lactate, Blood: 0.8 mmol/L (01-03-21 @ 20:49)          RADIOLOGY:      CT Head No Cont (12.26.20 @ 06:38) >    IMPRESSION:    No CT evidence of acute territorial infarct or other acute intracranial pathology.

## 2021-01-06 NOTE — SWALLOW BEDSIDE ASSESSMENT ADULT - NS SPL SWALLOW CLINIC TRIAL FT
+toleration of nectar and puree w/o any immediate overt s/s of penetration/aspiration. Pt is dyspneic w/ increased work of breathing w/ po intake, aspiration risk is high 2' current O2 demands at this time.
+toleration of puree and nectar w/o immediate overt s/s of penetration/aspiration, no thin liquids trialed 2' respiratory effort w/ nectar.
+toleration of thin liquids w/o overt s/s of penetration/aspiration

## 2021-01-06 NOTE — PROGRESS NOTE ADULT - ASSESSMENT
75 year old woman with medical history of COPD on 5L home oxygen, paroxysmal afib on pradaxa, carotid stenosis (L 80%, R 40%) s/p L carotid stenting recently , HFpEF (EF 70% Dec 2020), HTN, HLD, bipolar disorder, left parotid mass, recently admitted between 12/7-12/11 for COVID PNA, readmitted for COVID 19 Pneumonia     # Acute Chronic respiratory failure 2/2 likely due to COVID PNA with septic Shock s/p self extubation on 1/3/20, currently on NRB, will attempt to start her on HFNC  - s/p 12/30 Tocilizumab 400mg x1  - s/p steroids and RDV on previous admission  - off abx (s/p 12 days cefepime)  - incentive spirometry  - prone positioning if can tolerate  - update: currently on vent will attempt to place on HFNC and wean as tolerated  - pending repeat speech and swallow, if fail again, will speak with Nutritionist for optimization    #Paroxysmal atrial fibrillation w/ RVR   - home dose: Pradaxa 150mg PO BID, switched to Lovenox 80 mg BID for now  - ASA 81 mg  and Plavix recently added after a recent TIA last month.   - c/w Metoprolol for rate control    #hand tremors- possible 2/2 sedation vs need to be on psych meds, continue to monitor if worsens consider neuro consult     # Left ICA s/p TCAR (transcarotid artery revascularization) 12/04 with TIA last admission  - c/w plavix, ASA  - Code stroke on 12/05 for aphasia - workup was negative for acute stroke    #Left intra parotid mass   - Patient will need out patient biopsy     # Chronic HFpEF  - ECHO (12/2/2020): Hyperdynamic global left ventricular systolic function; EF of 70 %; Moderately increased LV wall thickness. Grade I diastolic dysfunction.    # HTN: c/w metoprolol, can add nifedipine      #HLD - atorvastatin    #Bipolar Disorder  -Continue home meds Lamictal 100mg PO daily, Seroquel 200mg at bedtime, and Ativan 0.5mg PO twice daily PRN  - psych consult to assist with anxiety component     #) MISC  Activity: IAT  DVT ppx: lovenox   GI ppx: protonix  Diet: npo until speech and swallow recommendation made  Code: Full  Dispo: medical optimization  CHG wash     75 year old woman with medical history of COPD on 5L home oxygen, paroxysmal afib on pradaxa, carotid stenosis (L 80%, R 40%) s/p L carotid stenting recently , HFpEF (EF 70% Dec 2020), HTN, HLD, bipolar disorder, left parotid mass, recently admitted between 12/7-12/11 for COVID PNA, readmitted for COVID 19 Pneumonia     # Acute Chronic respiratory failure 2/2 likely due to COVID PNA with septic Shock s/p self extubation on 1/3/20, currently on NRB, will attempt to start her on HFNC  - s/p 12/30 Tocilizumab 400mg x1  - s/p steroids and RDV on previous admission  - off abx (s/p 12 days cefepime)  - incentive spirometry  - prone positioning if can tolerate  - update: patient is on NC: 5L, weaned it off brom NRB, saturating >92%  - on Dysphagia 1 diet, tolerating    #Paroxysmal atrial fibrillation w/ RVR   - home dose: Pradaxa 150mg PO BID, switched to Lovenox 80 mg BID for now  - ASA 81 mg  and Plavix recently added after a recent TIA last month.   - c/w Metoprolol for rate control    #hand tremors- possible 2/2 sedation vs need to be on psych meds, continue to monitor if worsens consider neuro consult     # Left ICA s/p TCAR (transcarotid artery revascularization) 12/04 with TIA last admission  - c/w plavix, ASA  - Code stroke on 12/05 for aphasia - workup was negative for acute stroke    #Left intra parotid mass   - Patient will need out patient biopsy     # Chronic HFpEF  - ECHO (12/2/2020): Hyperdynamic global left ventricular systolic function; EF of 70 %; Moderately increased LV wall thickness. Grade I diastolic dysfunction.    # HTN: c/w metoprolol, can add nifedipine      #HLD - atorvastatin    #Bipolar Disorder  -Continue home meds Lamictal 100mg PO daily, Seroquel 200mg at bedtime, and Ativan 0.5mg PO twice daily PRN  - psych consult to assist with anxiety component     may add Seroquel 50 mg q8h prn for breakthrough anxiety while hospitalized      #) MISC  Activity: IAT  DVT ppx: lovenox   GI ppx: protonix  Diet: npo until speech and swallow recommendation made  Code: Full  Dispo: medical optimization  CHG wash     75 year old woman with medical history of COPD on 5L home oxygen, paroxysmal afib on pradaxa, carotid stenosis (L 80%, R 40%) s/p L carotid stenting recently , HFpEF (EF 70% Dec 2020), HTN, HLD, bipolar disorder, left parotid mass, recently admitted between 12/7-12/11 for COVID PNA, readmitted for COVID 19 Pneumonia     # Acute Chronic respiratory failure 2/2 likely due to COVID PNA with septic Shock s/p self extubation on 1/3/20, currently on NRB, will attempt to start her on HFNC  - s/p 12/30 Tocilizumab 400mg x1  - s/p steroids and RDV on previous admission  - off abx (s/p 12 days cefepime)  - incentive spirometry  - prone positioning if can tolerate  - update: patient is on NC: 5L, weaned it off brom NRB, saturating >92%  - on Dysphagia 1 diet, tolerating    #Paroxysmal atrial fibrillation w/ RVR   - home dose: Pradaxa 150mg PO BID, switched to Lovenox 80 mg BID for now  - ASA 81 mg  and Plavix recently added after a recent TIA last month.   - c/w Metoprolol for rate control    #hand tremors- possible 2/2 sedation vs need to be on psych meds, continue to monitor if worsens consider neuro consult     # Left ICA s/p TCAR (transcarotid artery revascularization) 12/04 with TIA last admission  - c/w plavix, ASA  - Code stroke on 12/05 for aphasia - workup was negative for acute stroke    #Left intra parotid mass   - Patient will need out patient biopsy     # Chronic HFpEF  - ECHO (12/2/2020): Hyperdynamic global left ventricular systolic function; EF of 70 %; Moderately increased LV wall thickness. Grade I diastolic dysfunction.    # HTN: c/w metoprolol, can add nifedipine      #HLD - atorvastatin    #Bipolar Disorder  -Continue home meds Lamictal 100mg PO daily, Seroquel 200mg at bedtime, and Ativan 0.5mg PO twice daily PRN  - psych consult to assist with anxiety component   - may add Seroquel 50 mg q8h prn for breakthrough anxiety while hospitalized  -For severe agitation not responding to behavioral intervention, may give haldol 3 mg po q8h prn, with escalation to IM  if patient refusing PO and remains an imminent danger to self or others. If IM antipsychotic is administered, please perform follow-up ECG for QTc monitoring.  If QTC is prolonged >500ms, please hold all psychotropic medications.      #) MISC  Activity: IAT  DVT ppx: lovenox   GI ppx: protonix  Diet: D1 with nectar  Code: Full  Dispo: medical optimization  CHG wash     75 year old woman with medical history of COPD on 5L home oxygen, paroxysmal afib on pradaxa, carotid stenosis (L 80%, R 40%) s/p L carotid stenting recently , HFpEF (EF 70% Dec 2020), HTN, HLD, bipolar disorder, left parotid mass, recently admitted between 12/7-12/11 for COVID PNA, readmitted for COVID 19 Pneumonia     # Acute Chronic respiratory failure 2/2 likely due to COVID PNA with septic Shock s/p self extubation on 1/3/20, currently on NRB, will attempt to start her on HFNC  - s/p 12/30 Tocilizumab 400mg x1  - s/p steroids and RDV on previous admission  - off abx (s/p 12 days cefepime)  - incentive spirometry  - prone positioning if can tolerate  - update: patient is on NC: 5L, weaned it off brom NRB, saturating >92%  - on Dysphagia 1 diet, tolerating    # Hypokalemia- repleted    #Paroxysmal atrial fibrillation w/ RVR   - home dose: Pradaxa 150mg PO BID, switched to Lovenox 80 mg BID for now  - ASA 81 mg  and Plavix recently added after a recent TIA last month.   - c/w Metoprolol for rate control    #hand tremors- possible 2/2 sedation vs need to be on psych meds, continue to monitor if worsens consider neuro consult     # Left ICA s/p TCAR (transcarotid artery revascularization) 12/04 with TIA last admission  - c/w plavix, ASA  - Code stroke on 12/05 for aphasia - workup was negative for acute stroke    #Left intra parotid mass   - Patient will need out patient biopsy     # Chronic HFpEF  - ECHO (12/2/2020): Hyperdynamic global left ventricular systolic function; EF of 70 %; Moderately increased LV wall thickness. Grade I diastolic dysfunction.    # HTN: c/w metoprolol, can add nifedipine      #HLD - atorvastatin    #Bipolar Disorder  -Continue home meds Lamictal 100mg PO daily, Seroquel 200mg at bedtime, and Ativan 0.5mg PO twice daily PRN  - psych consult to assist with anxiety component   - may add Seroquel 50 mg q8h prn for breakthrough anxiety while hospitalized  -For severe agitation not responding to behavioral intervention, may give haldol 3 mg po q8h prn, with escalation to IM  if patient refusing PO and remains an imminent danger to self or others. If IM antipsychotic is administered, please perform follow-up ECG for QTc monitoring.  If QTC is prolonged >500ms, please hold all psychotropic medications.      #) MISC  Activity: IAT  DVT ppx: lovenox   GI ppx: protonix  Diet: D1 with nectar  Code: Full  Dispo: medical optimization  CHG wash

## 2021-01-06 NOTE — SWALLOW BEDSIDE ASSESSMENT ADULT - SWALLOW EVAL: DIAGNOSIS
+toleration of nectar and puree w/o any immediate overt s/s of penetration/aspiration. Pt is dyspneic w/ increased work of breathing w/ po intake, aspiration risk is high 2' current O2 demands at this time.
+toleration of thin liquids w/o overt s/s of penetration/aspiration
+toleration of puree and nectar thick liquids w/o overt s/s of penetration/aspiration

## 2021-01-06 NOTE — PROGRESS NOTE ADULT - ASSESSMENT
IMPRESSION:    Acute hypoxemic resp failure./ severe Covid pneumonia   P A fib rapid  COPD on home oxygen  ? stroke   possible bact superinfection sp abx      PLAN:    CNS: avoid CNS depreeant    HEENT:  Oral care    PULMONARY:  HOB @ 45 degrees, NC keep Sao2 88 to 94%, repeat CXR, incentive spirometry    CARDIOVASCULAR: keep equal balance, cardio f/up, LASIX IV DAILY,     GI: GI prophylaxis                                          Feeding pwe speech  RENAL:  F/u  lytes.  Correct as needed. accurate I/O    INFECTIOUS DISEASE: f/up cx, cefepime/, f/up infl markers    HEMATOLOGICAL:  DVT prophylaxis. lovenox, trend CBC    ENDOCRINE:  Follow up FS.  Insulin protocol if needed.    MUSCULOSKELETAL: bedrest    DISPOSITION: ED3  poor prognosis  advance directives  dc TLC  PT/OT

## 2021-01-06 NOTE — SWALLOW BEDSIDE ASSESSMENT ADULT - SLP PERTINENT HISTORY OF CURRENT PROBLEM
Pt admitted w/ CP, weakness, s/p intubation, COVID +, self extubated, now weaned to 4L O2 NC w/ SPO2 @96%
Pt admitted w/ CP, weakness, s/p intubation, COVID +, self extubated, 60% venti mask, improved dysphonia, improved communication, SPO2 ~97% on venti mask, switched to 4L O2 NC w/ RT approval, pt maintained SPO2 between 88-93%
Pt admitted w/ CP, weakness, s/p intubation, COVID +, self extubated, bipap over night now on 60% venti mask, dysphonic, attempting to communicate, SPO2 ~90-93%

## 2021-01-06 NOTE — PROGRESS NOTE ADULT - NSHPATTENDINGPLANDISCUSS_GEN_ALL_CORE
TEAM
resident
resident
team
resident

## 2021-01-06 NOTE — PROGRESS NOTE ADULT - SUBJECTIVE AND OBJECTIVE BOX
OVERNIGHT EVENTS: events noted, on NC, afebrile    Vital Signs Last 24 Hrs  T(C): 36.3 (06 Jan 2021 04:00), Max: 36.9 (05 Jan 2021 15:37)  T(F): 97.4 (06 Jan 2021 04:00), Max: 98.4 (05 Jan 2021 15:37)  HR: 77 (06 Jan 2021 04:00) (71 - 93)  BP: 113/56 (06 Jan 2021 04:00) (113/56 - 160/74)  BP(mean): 86 (05 Jan 2021 12:00) (86 - 86)  RR: 20 (06 Jan 2021 04:00) (20 - 20)  SpO2: 99% (06 Jan 2021 04:00) (90% - 99%)    PHYSICAL EXAMINATION:    GENERAL: ill looking, weak    HEENT: Head is normocephalic and atraumatic.     NECK: Supple.    LUNGS: bl crackles    HEART: MARCELINO 3/6    ABDOMEN: Soft, nontender, and nondistended.      EXTREMITIES: Without any cyanosis, clubbing, rash, lesions or edema.    NEUROLOGIC: non focal    SKIN: No ulceration or induration present.      LABS:                        8.3    3.60  )-----------( 249      ( 06 Jan 2021 04:30 )             27.6     01-06    146  |  108  |  16  ----------------------------<  90  3.3<L>   |  31  |  0.6<L>    Ca    8.4<L>      06 Jan 2021 04:30  Phos  2.8     01-06  Mg     2.1     01-06    TPro  5.2<L>  /  Alb  2.9<L>  /  TBili  0.6  /  DBili  x   /  AST  45<H>  /  ALT  27  /  AlkPhos  88  01-06 01-05-21 @ 07:01  -  01-06-21 @ 07:00  --------------------------------------------------------  IN: 100 mL / OUT: 447 mL / NET: -347 mL        MICROBIOLOGY:      MEDICATIONS  (STANDING):  ascorbic acid 500 milliGRAM(s) Oral daily  aspirin  chewable 81 milliGRAM(s) Oral daily  atorvastatin 20 milliGRAM(s) Oral at bedtime  budesonide 160 MICROgram(s)/formoterol 4.5 MICROgram(s) Inhaler 2 Puff(s) Inhalation two times a day  chlorhexidine 0.12% Liquid 15 milliLiter(s) Oral Mucosa every 12 hours  clopidogrel Tablet 75 milliGRAM(s) Oral daily  enoxaparin Injectable 80 milliGRAM(s) SubCutaneous two times a day  gabapentin 300 milliGRAM(s) Oral three times a day  lamoTRIgine 100 milliGRAM(s) Oral daily  LORazepam     Tablet 0.5 milliGRAM(s) Oral two times a day  metoprolol tartrate 100 milliGRAM(s) Oral every 12 hours  montelukast 10 milliGRAM(s) Oral daily  multivitamin 1 Tablet(s) Oral daily  pantoprazole    Tablet 40 milliGRAM(s) Oral before breakfast  polyethylene glycol 3350 17 Gram(s) Oral daily  QUEtiapine 200 milliGRAM(s) Oral at bedtime  senna 2 Tablet(s) Oral at bedtime  tiotropium 18 MICROgram(s) Capsule 1 Capsule(s) Inhalation daily    MEDICATIONS  (PRN):  acetaminophen  Suppository .. 650 milliGRAM(s) Rectal every 6 hours PRN Temp greater or equal to 38C (100.4F)  ALBUTerol    90 MICROgram(s) HFA Inhaler 1 Puff(s) Inhalation every 4 hours PRN Shortness of Breath  aluminum hydroxide/magnesium hydroxide/simethicone Suspension 30 milliLiter(s) Oral every 4 hours PRN Dyspepsia  guaifenesin/dextromethorphan  Syrup 10 milliLiter(s) Oral every 6 hours PRN Cough      RADIOLOGY & ADDITIONAL STUDIES:

## 2021-01-07 LAB
GLUCOSE BLDC GLUCOMTR-MCNC: 105 MG/DL — HIGH (ref 70–99)
GLUCOSE BLDC GLUCOMTR-MCNC: 106 MG/DL — HIGH (ref 70–99)
GLUCOSE BLDC GLUCOMTR-MCNC: 87 MG/DL — SIGNIFICANT CHANGE UP (ref 70–99)

## 2021-01-07 PROCEDURE — 71045 X-RAY EXAM CHEST 1 VIEW: CPT | Mod: 26

## 2021-01-07 PROCEDURE — 99233 SBSQ HOSP IP/OBS HIGH 50: CPT | Mod: CS

## 2021-01-07 RX ADMIN — CHLORHEXIDINE GLUCONATE 15 MILLILITER(S): 213 SOLUTION TOPICAL at 17:30

## 2021-01-07 RX ADMIN — Medication 100 MILLIGRAM(S): at 06:08

## 2021-01-07 RX ADMIN — LAMOTRIGINE 100 MILLIGRAM(S): 25 TABLET, ORALLY DISINTEGRATING ORAL at 12:19

## 2021-01-07 RX ADMIN — SENNA PLUS 2 TABLET(S): 8.6 TABLET ORAL at 22:38

## 2021-01-07 RX ADMIN — Medication 30 MILLILITER(S): at 17:34

## 2021-01-07 RX ADMIN — ATORVASTATIN CALCIUM 20 MILLIGRAM(S): 80 TABLET, FILM COATED ORAL at 22:38

## 2021-01-07 RX ADMIN — Medication 0.5 MILLIGRAM(S): at 17:33

## 2021-01-07 RX ADMIN — GABAPENTIN 300 MILLIGRAM(S): 400 CAPSULE ORAL at 12:20

## 2021-01-07 RX ADMIN — MONTELUKAST 10 MILLIGRAM(S): 4 TABLET, CHEWABLE ORAL at 12:19

## 2021-01-07 RX ADMIN — Medication 0.5 MILLIGRAM(S): at 06:07

## 2021-01-07 RX ADMIN — GABAPENTIN 300 MILLIGRAM(S): 400 CAPSULE ORAL at 22:38

## 2021-01-07 RX ADMIN — CLOPIDOGREL BISULFATE 75 MILLIGRAM(S): 75 TABLET, FILM COATED ORAL at 12:21

## 2021-01-07 RX ADMIN — PANTOPRAZOLE SODIUM 40 MILLIGRAM(S): 20 TABLET, DELAYED RELEASE ORAL at 12:19

## 2021-01-07 RX ADMIN — ENOXAPARIN SODIUM 80 MILLIGRAM(S): 100 INJECTION SUBCUTANEOUS at 06:08

## 2021-01-07 RX ADMIN — QUETIAPINE FUMARATE 200 MILLIGRAM(S): 200 TABLET, FILM COATED ORAL at 22:38

## 2021-01-07 RX ADMIN — PREGABALIN 1000 MICROGRAM(S): 225 CAPSULE ORAL at 12:21

## 2021-01-07 RX ADMIN — Medication 81 MILLIGRAM(S): at 12:21

## 2021-01-07 RX ADMIN — GABAPENTIN 300 MILLIGRAM(S): 400 CAPSULE ORAL at 06:09

## 2021-01-07 RX ADMIN — POLYETHYLENE GLYCOL 3350 17 GRAM(S): 17 POWDER, FOR SOLUTION ORAL at 12:19

## 2021-01-07 RX ADMIN — Medication 1 MILLIGRAM(S): at 12:20

## 2021-01-07 RX ADMIN — ENOXAPARIN SODIUM 80 MILLIGRAM(S): 100 INJECTION SUBCUTANEOUS at 17:30

## 2021-01-07 RX ADMIN — CHLORHEXIDINE GLUCONATE 15 MILLILITER(S): 213 SOLUTION TOPICAL at 06:07

## 2021-01-07 RX ADMIN — Medication 100 MILLIGRAM(S): at 17:30

## 2021-01-07 RX ADMIN — Medication 500 MILLIGRAM(S): at 12:21

## 2021-01-07 RX ADMIN — Medication 1 TABLET(S): at 12:18

## 2021-01-07 NOTE — PROGRESS NOTE ADULT - ASSESSMENT
74 yo female with PMH of HTN, Paroxysmal AFIB, COPD on 5L, HFpEF, left carotid stent and bipolar disease was admitted for COVID-19 pneumonia. She was recently admitted on 12/7-12/11.      A/P:   Acute Chronic Hypoxic respiratory failure: Improved, now on nasal canula.   COVID Pneumonia  with septic Shock:   s/p intubation 12/29 and self extubation on 12/30  CXR 1/7 stable bilateral opacities.   She received Tocilizumab 12/30, Dexamethasone and Remdisivr previous admission.   Completed Cefepime for 12 days.     COPD:   Continue with Spiriva and Symbicort.     Paroxysmal atrial fibrillation:   On Lovenox therapeutic, can switch to her home dose of Pradaxa.   Continue Metoprolol.       Hand tremors: improved, continue home dose of Ativan.     Left ICA Stenosis s/p TCAR (transcarotid artery revascularization) 12/04 with TIA last admission  Continue ASA, Plavix and Lipitor.     Chronic HFpEF  ECHO (12/2/2020): Hyperdynamic global left ventricular systolic function; EF of 70 %; Moderately increased LV wall thickness. Grade I diastolic dysfunction.    HTN: c/w metoprolol, can add nifedipine        Bipolar Disorder  -Continue home meds Lamictal 100mg PO daily, Seroquel 200mg at bedtime, and Ativan 0.5mg PO twice daily PRN    #Progress Note Handoff:  Pending (specify): PT, improving  Family discussion:  Disposition: Patient is refusing STR,

## 2021-01-07 NOTE — PROGRESS NOTE ADULT - ASSESSMENT
75 year old woman with medical history of COPD on 5L home oxygen, paroxysmal afib on pradaxa, carotid stenosis (L 80%, R 40%) s/p L carotid stenting recently , HFpEF (EF 70% Dec 2020), HTN, HLD, bipolar disorder, left parotid mass, recently admitted between 12/7-12/11 for COVID PNA, readmitted for COVID 19 Pneumonia     # Acute Chronic respiratory failure 2/2 likely due to COVID PNA with septic Shock s/p self extubation on 1/3/20, currently on NRB, will attempt to start her on HFNC  - s/p 12/30 Tocilizumab 400mg x1  - s/p steroids and RDV on previous admission  - off abx (s/p 12 days cefepime)  - incentive spirometry  - prone positioning if can tolerate  - update: patient is on NC: 5L, weaned it off brom NRB, saturating >92%  - on Dysphagia 1 diet, tolerating    # Hypokalemia- repleted    #Paroxysmal atrial fibrillation w/ RVR   - home dose: Pradaxa 150mg PO BID, switched to Lovenox 80 mg BID for now  - ASA 81 mg  and Plavix recently added after a recent TIA last month.   - c/w Metoprolol for rate control    #hand tremors- possible 2/2 sedation vs need to be on psych meds, continue to monitor if worsens consider neuro consult     # Left ICA s/p TCAR (transcarotid artery revascularization) 12/04 with TIA last admission  - c/w plavix, ASA  - Code stroke on 12/05 for aphasia - workup was negative for acute stroke    #Left intra parotid mass   - Patient will need out patient biopsy     # Chronic HFpEF  - ECHO (12/2/2020): Hyperdynamic global left ventricular systolic function; EF of 70 %; Moderately increased LV wall thickness. Grade I diastolic dysfunction.    # HTN: c/w metoprolol, can add nifedipine      #HLD - atorvastatin    #Bipolar Disorder  -Continue home meds Lamictal 100mg PO daily, Seroquel 200mg at bedtime, and Ativan 0.5mg PO twice daily PRN  - psych consult to assist with anxiety component   - may add Seroquel 50 mg q8h prn for breakthrough anxiety while hospitalized  -For severe agitation not responding to behavioral intervention, may give haldol 3 mg po q8h prn, with escalation to IM  if patient refusing PO and remains an imminent danger to self or others. If IM antipsychotic is administered, please perform follow-up ECG for QTc monitoring.  If QTC is prolonged >500ms, please hold all psychotropic medications.      #) MISC  Activity: IAT  DVT ppx: lovenox   GI ppx: protonix  Diet: D1 with nectar  Code: Full  Dispo: medical optimization  CHG wash     75 year old woman with medical history of COPD on 5L home oxygen, paroxysmal afib on pradaxa, carotid stenosis (L 80%, R 40%) s/p L carotid stenting recently , HFpEF (EF 70% Dec 2020), HTN, HLD, bipolar disorder, left parotid mass, recently admitted between 12/7-12/11 for COVID PNA, readmitted for COVID 19 Pneumonia     # Acute Chronic respiratory failure 2/2 likely due to COVID PNA with septic Shock s/p self extubation on 1/3/20, currently on NRB, will attempt to start her on HFNC  - s/p 12/30 Tocilizumab 400mg x1  - s/p steroids and RDV on previous admission  - off abx (s/p 12 days cefepime)  - incentive spirometry  - prone positioning if can tolerate  - update: patient is on NC: 3L, saturating >92%  - on Dysphagia 1 diet, tolerating    # Hypokalemia- repleted    #Paroxysmal atrial fibrillation w/ RVR   - home dose: Pradaxa 150mg PO BID, switched to Lovenox 80 mg BID for now  - ASA 81 mg  and Plavix recently added after a recent TIA last month.   - c/w Metoprolol for rate control    #hand tremors- possible 2/2 sedation vs need to be on psych meds, continue to monitor if worsens consider neuro consult     # Left ICA s/p TCAR (transcarotid artery revascularization) 12/04 with TIA last admission  - c/w plavix, ASA  - Code stroke on 12/05 for aphasia - workup was negative for acute stroke    #Left intra parotid mass   - Patient will need out patient biopsy     # Chronic HFpEF  - ECHO (12/2/2020): Hyperdynamic global left ventricular systolic function; EF of 70 %; Moderately increased LV wall thickness. Grade I diastolic dysfunction.    # HTN: c/w metoprolol, can add nifedipine      #HLD - atorvastatin    #Bipolar Disorder  -Continue home meds Lamictal 100mg PO daily, Seroquel 200mg at bedtime, and Ativan 0.5mg PO twice daily PRN  - psych consult to assist with anxiety component   - may add Seroquel 50 mg q8h prn for breakthrough anxiety while hospitalized  -For severe agitation not responding to behavioral intervention, may give haldol 3 mg po q8h prn, with escalation to IM  if patient refusing PO and remains an imminent danger to self or others. If IM antipsychotic is administered, please perform follow-up ECG for QTc monitoring.  If QTC is prolonged >500ms, please hold all psychotropic medications.      #) MISC  Activity: IAT  DVT ppx: lovenox   GI ppx: protonix  Diet: DASH/TLC  Code: Full  Dispo: medical optimization  CHG wash

## 2021-01-07 NOTE — PROGRESS NOTE ADULT - SUBJECTIVE AND OBJECTIVE BOX
SHAUN COATES  75y  Female      Patient is a 75y old  Female who presents with a chief complaint of chest pain weakness (07 Jan 2021 08:12)      INTERVAL HPI/OVERNIGHT EVENTS:  she feels better, she is weak, didn't walk much with PT>   Vital Signs Last 24 Hrs  T(C): 36.7 (07 Jan 2021 08:39), Max: 37.3 (06 Jan 2021 23:40)  T(F): 98 (07 Jan 2021 08:39), Max: 99.1 (06 Jan 2021 23:40)  HR: 75 (07 Jan 2021 08:39) (75 - 87)  BP: 155/70 (07 Jan 2021 08:39) (113/56 - 155/70)  BP(mean): --  RR: 20 (07 Jan 2021 08:39) (20 - 22)  SpO2: 97% (07 Jan 2021 08:39) (93% - 98%)      01-06-21 @ 07:01  -  01-07-21 @ 07:00  --------------------------------------------------------  IN: 240 mL / OUT: 720 mL / NET: -480 mL            Consultant(s) Notes Reviewed:  [x ] YES  [ ] NO          MEDICATIONS  (STANDING):  ascorbic acid 500 milliGRAM(s) Oral daily  aspirin  chewable 81 milliGRAM(s) Oral daily  atorvastatin 20 milliGRAM(s) Oral at bedtime  budesonide 160 MICROgram(s)/formoterol 4.5 MICROgram(s) Inhaler 2 Puff(s) Inhalation two times a day  chlorhexidine 0.12% Liquid 15 milliLiter(s) Oral Mucosa every 12 hours  clopidogrel Tablet 75 milliGRAM(s) Oral daily  cyanocobalamin 1000 MICROGram(s) Oral daily  enoxaparin Injectable 80 milliGRAM(s) SubCutaneous two times a day  folic acid 1 milliGRAM(s) Oral daily  gabapentin 300 milliGRAM(s) Oral three times a day  lamoTRIgine 100 milliGRAM(s) Oral daily  LORazepam     Tablet 0.5 milliGRAM(s) Oral two times a day  metoprolol tartrate 100 milliGRAM(s) Oral every 12 hours  montelukast 10 milliGRAM(s) Oral daily  multivitamin 1 Tablet(s) Oral daily  pantoprazole    Tablet 40 milliGRAM(s) Oral before breakfast  polyethylene glycol 3350 17 Gram(s) Oral daily  QUEtiapine 200 milliGRAM(s) Oral at bedtime  senna 2 Tablet(s) Oral at bedtime  tiotropium 18 MICROgram(s) Capsule 1 Capsule(s) Inhalation daily    MEDICATIONS  (PRN):  acetaminophen  Suppository .. 650 milliGRAM(s) Rectal every 6 hours PRN Temp greater or equal to 38C (100.4F)  ALBUTerol    90 MICROgram(s) HFA Inhaler 1 Puff(s) Inhalation every 4 hours PRN Shortness of Breath  aluminum hydroxide/magnesium hydroxide/simethicone Suspension 30 milliLiter(s) Oral every 4 hours PRN Dyspepsia  guaifenesin/dextromethorphan  Syrup 10 milliLiter(s) Oral every 6 hours PRN Cough      LABS                          8.3    3.60  )-----------( 249      ( 06 Jan 2021 04:30 )             27.6     01-06    146  |  108  |  16  ----------------------------<  90  3.3<L>   |  31  |  0.6<L>    Ca    8.4<L>      06 Jan 2021 04:30  Phos  2.8     01-06  Mg     2.1     01-06    TPro  5.2<L>  /  Alb  2.9<L>  /  TBili  0.6  /  DBili  x   /  AST  45<H>  /  ALT  27  /  AlkPhos  88  01-06          Lactate Trend  01-03 @ 20:49 Lactate:0.8         CAPILLARY BLOOD GLUCOSE      POCT Blood Glucose.: 106 mg/dL (07 Jan 2021 11:30)      Culture - Blood (collected 01-01-21 @ 07:24)  Source: .Blood None  Final Report (01-06-21 @ 12:00):    No Growth Final        RADIOLOGY & ADDITIONAL TESTS:    Imaging Personally Reviewed:  [ ] YES  [ ] NO    HEALTH ISSUES - PROBLEM Dx:  Delirium    Bipolar disorder in remission            PHYSICAL EXAM:  GENERAL: mild distress.   HEAD:  Atraumatic, Normocephalic.  EYES: EOMI, PERRLA, conjunctiva and sclera clear.  NECK: Supple, Left IJ central line.   CHEST/LUNG: bilateral lower lung rales.   HEART: Regular rate and rhythm; S1 S2.   ABDOMEN: Soft, Nontender, Nondistended; Bowel sounds present.  EXTREMITIES:  2+ Peripheral Pulses, No clubbing, cyanosis, or edema.  PSYCH: AAOx3.  NEUROLOGY: non-focal.  SKIN: No rashes or lesions.

## 2021-01-08 LAB
ALBUMIN SERPL ELPH-MCNC: 3.3 G/DL — LOW (ref 3.5–5.2)
ALP SERPL-CCNC: 96 U/L — SIGNIFICANT CHANGE UP (ref 30–115)
ALT FLD-CCNC: 27 U/L — SIGNIFICANT CHANGE UP (ref 0–41)
ANION GAP SERPL CALC-SCNC: 8 MMOL/L — SIGNIFICANT CHANGE UP (ref 7–14)
AST SERPL-CCNC: 50 U/L — HIGH (ref 0–41)
BASOPHILS # BLD AUTO: 0.02 K/UL — SIGNIFICANT CHANGE UP (ref 0–0.2)
BASOPHILS NFR BLD AUTO: 0.5 % — SIGNIFICANT CHANGE UP (ref 0–1)
BILIRUB SERPL-MCNC: 0.6 MG/DL — SIGNIFICANT CHANGE UP (ref 0.2–1.2)
BUN SERPL-MCNC: 12 MG/DL — SIGNIFICANT CHANGE UP (ref 10–20)
CALCIUM SERPL-MCNC: 9.1 MG/DL — SIGNIFICANT CHANGE UP (ref 8.5–10.1)
CHLORIDE SERPL-SCNC: 107 MMOL/L — SIGNIFICANT CHANGE UP (ref 98–110)
CO2 SERPL-SCNC: 27 MMOL/L — SIGNIFICANT CHANGE UP (ref 17–32)
CREAT SERPL-MCNC: 0.7 MG/DL — SIGNIFICANT CHANGE UP (ref 0.7–1.5)
EOSINOPHIL # BLD AUTO: 0.2 K/UL — SIGNIFICANT CHANGE UP (ref 0–0.7)
EOSINOPHIL NFR BLD AUTO: 4.8 % — SIGNIFICANT CHANGE UP (ref 0–8)
FOLATE SERPL-MCNC: 15.5 NG/ML — SIGNIFICANT CHANGE UP
GLUCOSE SERPL-MCNC: 94 MG/DL — SIGNIFICANT CHANGE UP (ref 70–99)
HCT VFR BLD CALC: 29.2 % — LOW (ref 37–47)
HGB BLD-MCNC: 9.1 G/DL — LOW (ref 12–16)
IMM GRANULOCYTES NFR BLD AUTO: 0.5 % — HIGH (ref 0.1–0.3)
LYMPHOCYTES # BLD AUTO: 1.18 K/UL — LOW (ref 1.2–3.4)
LYMPHOCYTES # BLD AUTO: 28.2 % — SIGNIFICANT CHANGE UP (ref 20.5–51.1)
MCHC RBC-ENTMCNC: 30.7 PG — SIGNIFICANT CHANGE UP (ref 27–31)
MCHC RBC-ENTMCNC: 31.2 G/DL — LOW (ref 32–37)
MCV RBC AUTO: 98.6 FL — SIGNIFICANT CHANGE UP (ref 81–99)
MONOCYTES # BLD AUTO: 0.45 K/UL — SIGNIFICANT CHANGE UP (ref 0.1–0.6)
MONOCYTES NFR BLD AUTO: 10.7 % — HIGH (ref 1.7–9.3)
NEUTROPHILS # BLD AUTO: 2.32 K/UL — SIGNIFICANT CHANGE UP (ref 1.4–6.5)
NEUTROPHILS NFR BLD AUTO: 55.3 % — SIGNIFICANT CHANGE UP (ref 42.2–75.2)
NRBC # BLD: 0 /100 WBCS — SIGNIFICANT CHANGE UP (ref 0–0)
PLATELET # BLD AUTO: 289 K/UL — SIGNIFICANT CHANGE UP (ref 130–400)
POTASSIUM SERPL-MCNC: 3.7 MMOL/L — SIGNIFICANT CHANGE UP (ref 3.5–5)
POTASSIUM SERPL-SCNC: 3.7 MMOL/L — SIGNIFICANT CHANGE UP (ref 3.5–5)
PROT SERPL-MCNC: 6.2 G/DL — SIGNIFICANT CHANGE UP (ref 6–8)
RBC # BLD: 2.96 M/UL — LOW (ref 4.2–5.4)
RBC # FLD: 16.8 % — HIGH (ref 11.5–14.5)
SODIUM SERPL-SCNC: 142 MMOL/L — SIGNIFICANT CHANGE UP (ref 135–146)
VIT B12 SERPL-MCNC: 1450 PG/ML — HIGH (ref 232–1245)
WBC # BLD: 4.19 K/UL — LOW (ref 4.8–10.8)
WBC # FLD AUTO: 4.19 K/UL — LOW (ref 4.8–10.8)

## 2021-01-08 PROCEDURE — 99233 SBSQ HOSP IP/OBS HIGH 50: CPT | Mod: CS

## 2021-01-08 RX ORDER — DABIGATRAN ETEXILATE MESYLATE 150 MG/1
150 CAPSULE ORAL EVERY 12 HOURS
Refills: 0 | Status: DISCONTINUED | OUTPATIENT
Start: 2021-01-08 | End: 2021-01-09

## 2021-01-08 RX ADMIN — Medication 1 TABLET(S): at 10:51

## 2021-01-08 RX ADMIN — Medication 0.5 MILLIGRAM(S): at 06:23

## 2021-01-08 RX ADMIN — POLYETHYLENE GLYCOL 3350 17 GRAM(S): 17 POWDER, FOR SOLUTION ORAL at 10:51

## 2021-01-08 RX ADMIN — Medication 30 MILLILITER(S): at 20:03

## 2021-01-08 RX ADMIN — ENOXAPARIN SODIUM 80 MILLIGRAM(S): 100 INJECTION SUBCUTANEOUS at 06:23

## 2021-01-08 RX ADMIN — BUDESONIDE AND FORMOTEROL FUMARATE DIHYDRATE 2 PUFF(S): 160; 4.5 AEROSOL RESPIRATORY (INHALATION) at 20:04

## 2021-01-08 RX ADMIN — Medication 81 MILLIGRAM(S): at 10:50

## 2021-01-08 RX ADMIN — CLOPIDOGREL BISULFATE 75 MILLIGRAM(S): 75 TABLET, FILM COATED ORAL at 10:50

## 2021-01-08 RX ADMIN — SENNA PLUS 2 TABLET(S): 8.6 TABLET ORAL at 21:31

## 2021-01-08 RX ADMIN — Medication 100 MILLIGRAM(S): at 16:54

## 2021-01-08 RX ADMIN — LAMOTRIGINE 100 MILLIGRAM(S): 25 TABLET, ORALLY DISINTEGRATING ORAL at 10:50

## 2021-01-08 RX ADMIN — Medication 500 MILLIGRAM(S): at 10:50

## 2021-01-08 RX ADMIN — GABAPENTIN 300 MILLIGRAM(S): 400 CAPSULE ORAL at 21:31

## 2021-01-08 RX ADMIN — Medication 0.5 MILLIGRAM(S): at 16:56

## 2021-01-08 RX ADMIN — BUDESONIDE AND FORMOTEROL FUMARATE DIHYDRATE 2 PUFF(S): 160; 4.5 AEROSOL RESPIRATORY (INHALATION) at 08:45

## 2021-01-08 RX ADMIN — PANTOPRAZOLE SODIUM 40 MILLIGRAM(S): 20 TABLET, DELAYED RELEASE ORAL at 06:23

## 2021-01-08 RX ADMIN — QUETIAPINE FUMARATE 200 MILLIGRAM(S): 200 TABLET, FILM COATED ORAL at 21:31

## 2021-01-08 RX ADMIN — Medication 1 MILLIGRAM(S): at 10:50

## 2021-01-08 RX ADMIN — CHLORHEXIDINE GLUCONATE 15 MILLILITER(S): 213 SOLUTION TOPICAL at 16:54

## 2021-01-08 RX ADMIN — GABAPENTIN 300 MILLIGRAM(S): 400 CAPSULE ORAL at 12:43

## 2021-01-08 RX ADMIN — MONTELUKAST 10 MILLIGRAM(S): 4 TABLET, CHEWABLE ORAL at 10:50

## 2021-01-08 RX ADMIN — ATORVASTATIN CALCIUM 20 MILLIGRAM(S): 80 TABLET, FILM COATED ORAL at 21:31

## 2021-01-08 RX ADMIN — CHLORHEXIDINE GLUCONATE 15 MILLILITER(S): 213 SOLUTION TOPICAL at 06:22

## 2021-01-08 RX ADMIN — Medication 100 MILLIGRAM(S): at 06:23

## 2021-01-08 RX ADMIN — DABIGATRAN ETEXILATE MESYLATE 150 MILLIGRAM(S): 150 CAPSULE ORAL at 16:54

## 2021-01-08 RX ADMIN — PREGABALIN 1000 MICROGRAM(S): 225 CAPSULE ORAL at 10:50

## 2021-01-08 RX ADMIN — GABAPENTIN 300 MILLIGRAM(S): 400 CAPSULE ORAL at 06:22

## 2021-01-08 NOTE — CONSULT NOTE ADULT - CONSULT REQUESTED DATE/TIME
26-Dec-2020 06:33
30-Dec-2020
24-Dec-2020 00:00
08-Jan-2021 18:01
24-Dec-2020 06:55
30-Dec-2020 16:46

## 2021-01-08 NOTE — PROGRESS NOTE ADULT - PROVIDER SPECIALTY LIST ADULT
Infectious Disease
Infectious Disease
Internal Medicine
Pulmonology
Hospitalist
Internal Medicine
Pulmonology
Critical Care
Critical Care
Hospitalist
Hospitalist
Internal Medicine
Pulmonology
Infectious Disease
Internal Medicine
Internal Medicine
Pulmonology
Hospitalist
Infectious Disease

## 2021-01-08 NOTE — CONSULT NOTE ADULT - CONSULT REASON
mobilization
weakness
****Stroke code: 6.20am
RESPIRATORY FAILURE /OGT FEED
afib w/ rvr
COVID-19 PNA

## 2021-01-08 NOTE — CONSULT NOTE ADULT - REASON FOR ADMISSION
chest pain weakness

## 2021-01-08 NOTE — CHART NOTE - NSCHARTNOTEFT_GEN_A_CORE
I made rounds today with the treatment team including the hospitalist, residents,  nurses and  and discussed the patient's current medical status and discharge  planning needs, and reviewed the chart.    T(C): 36.7 (01-08-21 @ 05:00), Max: 36.9 (01-07-21 @ 16:18)  HR: 88 (01-08-21 @ 08:25) (70 - 88)  BP: 144/95 (01-08-21 @ 08:25) (144/95 - 180/78)  RR: 18 (01-08-21 @ 05:00) (18 - 20)  SpO2: 96% (01-08-21 @ 05:00) (96% - 99%)          I reached out to the patient's health care proxy/ responsible family member-           [     ]  I reached                                     and discussed the patient's medical condition,                   family concerns, and discharge planning           [     ]  I left a message with family               [   x  ]  I personally participated in rounds with the medical team and my resident and discussed the case. My resident reached                   family member/ HCP   sb Fry                             under my direction and supervision  and we reviewed the conversation.          [     ]  My resident left a message with family under my direction and supervision           [     ]   My resident attended medical rounds and called the family                [  x    ]    The following was discussed:  The patient's medical status over the past 24 hrs was reviewed, as well as oxygen needs and medication changes and labs. On 5 liters NC O2 (home dose). Chest x-ray better. 2 person assistance to stand. Plan d/c to STR. Put back on home Pradaxa         [    x  ]   The following concerns were raised: wants STR          [     ] I spent 5-10 minutes on the above discussing medical issues with team members and family and/ or my resident    [   x  ] I spent 11-20 minutes on the above discussing medical issues with team members and family and/ or my resident    [     ] I spent 21-30 minutes on the above discussing medical issues with team members and family and/ or my resident

## 2021-01-08 NOTE — PROGRESS NOTE ADULT - REASON FOR ADMISSION
chest pain weakness

## 2021-01-08 NOTE — CONSULT NOTE ADULT - ASSESSMENT
IMPRESSION: Rehab of gait disorder , debility due to COVID    PRECAUTIONS: [    ] Cardiac  [    ] Respiratory  [    ] Seizures [    ] Contact Isolation  [  x  ] Droplet Isolation  [    ] Other    Weight Bearing Status:     RECOMMENDATION:    Out of Bed to Chair     DVT/Decubiti Prophylaxis    REHAB PLAN:     [  x   ] Bedside P/T 3-5 times a week   [   x  ] Bedside O/T  2-3 times a week   [     ] No Rehab Therapy Indicated   [     ]  Speech Therapy   Conditioning/ROM                                 ADL  Bed Mobility                                            Conditioning/ROM  Transfers                                                  Bed Mobility  Sitting /Standing Balance                      Transfers                                        Gait Training                                            Sitting/Standing Balance  Stair Training [   ]Applicable                 Home equipment Eval                                                                     Splinting  [   ] Only      GOALS:   ADL   [    ]   Independent         Transfers  [    ] Independent            Ambulation  [     ] Independent     [  x   ] With device                            [  x  ]  CG                                               [ x   ]  CG                                                    [  x   ] CG                            [  x  ] Min A                                          [  x  ] Min A                                                [   x  ] Min  A          DISCHARGE PLAN:   [     ]  Good candidate for Intensive Rehabilitation/Hospital based-[facility]A [facility]                                             Will tolerate 3hrs Intensive Rehab Daily                                       [   x   ]  Short Term Rehab in Skilled Nursing Facility                                       [      ]  Home with Outpatient or  services                                         [      ]  Possible Candidate for Intensive Hospital based Rehab

## 2021-01-08 NOTE — PROGRESS NOTE ADULT - SUBJECTIVE AND OBJECTIVE BOX
Hospital Day:  16d    Subjective:    Patient is a 75y old  Female who presents with a chief complaint of chest pain weakness (07 Jan 2021 11:36)  This morning patient is lying in bed comfortably. She reports no new complaints, including SOB, chest pain, abdominal pain, nausea, vomiting, dysuria, constipation, or diarrhea. She staes she would like to go home, but understands that since she is so weak it would be better for her to go to [facility].      Past Medical Hx:   Falls    Congestive heart failure    Transient ischemic attack    FLAVIO on CPAP    Bipolar 1 disorder    Allergic reaction    PVD (peripheral vascular disease)    Other emphysema    Other cardiomyopathy    TIA (transient ischemic attack)    COPD (chronic obstructive pulmonary disease)    Depression    Hypertension      Past Sx:  History of cholecystectomy      Allergies:  codeine (Other (Moderate))  Depakote (Unknown)  Dilaudid (Short breath; Rash)  IV Contrast (Anaphylaxis)  losartan (Angioedema)  Risperdal (Other)  verapamil (Short breath; Angioedema)    Current Meds:   Standng Meds:  ascorbic acid 500 milliGRAM(s) Oral daily  aspirin  chewable 81 milliGRAM(s) Oral daily  atorvastatin 20 milliGRAM(s) Oral at bedtime  budesonide 160 MICROgram(s)/formoterol 4.5 MICROgram(s) Inhaler 2 Puff(s) Inhalation two times a day  chlorhexidine 0.12% Liquid 15 milliLiter(s) Oral Mucosa every 12 hours  clopidogrel Tablet 75 milliGRAM(s) Oral daily  cyanocobalamin 1000 MICROGram(s) Oral daily  enoxaparin Injectable 80 milliGRAM(s) SubCutaneous two times a day  folic acid 1 milliGRAM(s) Oral daily  gabapentin 300 milliGRAM(s) Oral three times a day  lamoTRIgine 100 milliGRAM(s) Oral daily  LORazepam     Tablet 0.5 milliGRAM(s) Oral two times a day  metoprolol tartrate 100 milliGRAM(s) Oral every 12 hours  montelukast 10 milliGRAM(s) Oral daily  multivitamin 1 Tablet(s) Oral daily  pantoprazole    Tablet 40 milliGRAM(s) Oral before breakfast  polyethylene glycol 3350 17 Gram(s) Oral daily  QUEtiapine 200 milliGRAM(s) Oral at bedtime  senna 2 Tablet(s) Oral at bedtime  tiotropium 18 MICROgram(s) Capsule 1 Capsule(s) Inhalation daily    PRN Meds:  acetaminophen  Suppository .. 650 milliGRAM(s) Rectal every 6 hours PRN Temp greater or equal to 38C (100.4F)  ALBUTerol    90 MICROgram(s) HFA Inhaler 1 Puff(s) Inhalation every 4 hours PRN Shortness of Breath  aluminum hydroxide/magnesium hydroxide/simethicone Suspension 30 milliLiter(s) Oral every 4 hours PRN Dyspepsia  guaifenesin/dextromethorphan  Syrup 10 milliLiter(s) Oral every 6 hours PRN Cough    HOME MEDICATIONS:  amLODIPine 2.5 mg oral tablet: 1 tab(s) orally once a day  aspirin 81 mg oral tablet: 1 tab(s) orally once a day  atorvastatin 20 mg oral tablet: 1 tab(s) orally once a day  budesonide-formoterol 160 mcg-4.5 mcg/inh inhalation aerosol: 2 puff(s) inhaled 2 times a day  furosemide 40 mg oral tablet: 1 tab(s) orally once a day  lamoTRIgine 100 mg oral tablet, extended release: 1 tab(s) orally once a day  LORazepam 0.5 mg oral tablet: 1 tab(s) orally 2 times a day, As Needed  Metoprolol Tartrate 100 mg oral tablet: 1 tab(s) orally 2 times a day  montelukast 10 mg oral tablet: 1 tab(s) orally once a day  Pepcid 20 mg oral tablet: 1 tab(s) orally once a day  Plavix 75 mg oral tablet: 1 tab(s) orally once a day  QUEtiapine 200 mg oral tablet: 1 tab(s) orally once a day (at bedtime)  tiotropium 18 mcg inhalation capsule: 1 cap(s) inhaled once a day      Vital Signs:   T(F): 98 (01-08-21 @ 05:00), Max: 98.5 (01-07-21 @ 16:18)  HR: 88 (01-08-21 @ 08:25) (70 - 88)  BP: 144/95 (01-08-21 @ 08:25) (144/95 - 180/78)  RR: 18 (01-08-21 @ 05:00) (18 - 20)  SpO2: 96% (01-08-21 @ 05:00) (96% - 99%)      01-07-21 @ 07:01  -  01-08-21 @ 07:00  --------------------------------------------------------  IN: 0 mL / OUT: 500 mL / NET: -500 mL        Physical Exam:   GENERAL: NAD  HEENT: NCAT  CHEST/LUNG: CTAB  HEART: Regular rate and rhythm; s1 s2 appreciated, No murmurs, rubs, or gallops  ABDOMEN: Soft, Nontender, Nondistended; Bowel sounds present  EXTREMITIES: No LE edema b/l  SKIN: no rashes, no new lesions  NERVOUS SYSTEM:  Alert & Oriented X3  LINES/CATHETERS:        Labs:                 Assessment and Plan:      75 year old woman with medical history of COPD on 5L home oxygen, paroxysmal afib on pradaxa, carotid stenosis (L 80%, R 40%) s/p L carotid stenting recently , HFpEF (EF 70% Dec 2020), HTN, HLD, bipolar disorder, left parotid mass, recently admitted between 12/7-12/11 for COVID PNA, readmitted for COVID 19 Pneumonia.     # Acute respiratory failure 2/2 to COVID PNA on chronic respiratory failure 2/2 COPD  - Currently 96% on 5L (her baseline)  - Was intubated on 12/29 and self extubated on 1/3  - s/p 12/30 Tocilizumab 400mg x1  - s/p steroids and RDV  - off abx (s/p 12 days cefepime)  - incentive spirometry  - prone positioning if can tolerate  - Patient is stable for d/c. SHe required 2 person assists to stand last week; will likely need SNF. COVID swab ordered    #Paroxysmal atrial fibrillation  - Will restart home dose: Pradaxa 150mg PO BID (was on Lovenox 80 mg BID)  - ASA 81 mg  and Plavix recently added after a recent TIA last month.   - c/w Metoprolol for rate control    #Bipolar Disorder  -Continue home meds Lamictal 100mg PO daily, Seroquel 200mg at bedtime, and Ativan 0.5mg PO twice daily PRN  - psych consult to assist with anxiety component   - may add Seroquel 50 mg q8h prn for breakthrough anxiety while hospitalized  -For severe agitation not responding to behavioral intervention, may give haldol 3 mg po q8h prn, with escalation to IM  if patient refusing PO and remains an imminent danger to self or others. If IM antipsychotic is administered, please perform follow-up ECG for QTc monitoring.  If QTC is prolonged >500ms, please hold all psychotropic medications.  # hand tremors- possible 2/2 sedation vs need to be on psych meds, continue to monitor if worsens consider neuro consult     # Left ICA s/p TCAR (transcarotid artery revascularization) 12/04 with TIA last admission  - c/w plavix, ASA    #Left intra parotid mass   - Patient will need out patient biopsy     # Chronic HFpEF  - ECHO (12/2/2020): Hyperdynamic global left ventricular systolic function; EF of 70 %; Moderately increased LV wall thickness. Grade I diastolic dysfunction.    # HTN: c/w metoprolol, can add nifedipine      #HLD - atorvastatin      #) MISC  Activity: IAT  DVT ppx: Pradaxa  GI ppx: protonix  Diet: DASH/TLC  Code: Full  Dispo: Pending placement, negative COVID tests  CHG wash

## 2021-01-08 NOTE — CONSULT NOTE ADULT - SUBJECTIVE AND OBJECTIVE BOX
HPI:  76yo female pmhx of COPD on 5L home oxygen, paroxysmal afib on pradaxa, carotid stenosis (L 80%, R 40%) s/p L carotid stenting recently , HFpEF (EF 70% Dec 2020), HTN, HLD, bipolar disorder ,left parotid mass ,  recently admitted between 12/7-12/11 for COVID PNA s/p steroids/RDV presented to the ED with chest pain , sharp in character , 8/10 intensity, non radiating, post tussive. Patient mentioned she has chronic non productive cough but yesterday afternoon she had pretty bad cough giving her chest pain. She also mentions she had 1 episode of subjective fever yesterday. Denies nausea/vomiting/chills/diarrhea/dysuria/urgency.       ED Course: initially vitally stable but she became hypotensive to 70/50 s/p 3L bolus >>remained hypotensive was started on low dose levo. Labs: BUN/Cr: 39/1.6, lactate :3, UA + Nitrite LE+ admitted for urosepsis and JOYCE   (23 Dec 2020 01:50)      PAST MEDICAL & SURGICAL HISTORY:  Falls    Congestive heart failure    Transient ischemic attack    FLAVIO on CPAP    Bipolar 1 disorder    Allergic reaction    PVD (peripheral vascular disease)    Other emphysema    Other cardiomyopathy    TIA (transient ischemic attack)    COPD (chronic obstructive pulmonary disease)    Depression    Hypertension    History of cholecystectomy        Hospital Course: getting tx for COVID        MEDICATIONS  (STANDING):  ascorbic acid 500 milliGRAM(s) Oral daily  aspirin  chewable 81 milliGRAM(s) Oral daily  atorvastatin 20 milliGRAM(s) Oral at bedtime  budesonide 160 MICROgram(s)/formoterol 4.5 MICROgram(s) Inhaler 2 Puff(s) Inhalation two times a day  chlorhexidine 0.12% Liquid 15 milliLiter(s) Oral Mucosa every 12 hours  clopidogrel Tablet 75 milliGRAM(s) Oral daily  cyanocobalamin 1000 MICROGram(s) Oral daily  dabigatran 150 milliGRAM(s) Oral every 12 hours  folic acid 1 milliGRAM(s) Oral daily  gabapentin 300 milliGRAM(s) Oral three times a day  lamoTRIgine 100 milliGRAM(s) Oral daily  LORazepam     Tablet 0.5 milliGRAM(s) Oral two times a day  metoprolol tartrate 100 milliGRAM(s) Oral every 12 hours  montelukast 10 milliGRAM(s) Oral daily  multivitamin 1 Tablet(s) Oral daily  pantoprazole    Tablet 40 milliGRAM(s) Oral before breakfast  polyethylene glycol 3350 17 Gram(s) Oral daily  QUEtiapine 200 milliGRAM(s) Oral at bedtime  senna 2 Tablet(s) Oral at bedtime  tiotropium 18 MICROgram(s) Capsule 1 Capsule(s) Inhalation daily    MEDICATIONS  (PRN):  acetaminophen  Suppository .. 650 milliGRAM(s) Rectal every 6 hours PRN Temp greater or equal to 38C (100.4F)  ALBUTerol    90 MICROgram(s) HFA Inhaler 1 Puff(s) Inhalation every 4 hours PRN Shortness of Breath  aluminum hydroxide/magnesium hydroxide/simethicone Suspension 30 milliLiter(s) Oral every 4 hours PRN Dyspepsia  guaifenesin/dextromethorphan  Syrup 10 milliLiter(s) Oral every 6 hours PRN Cough      FAMILY HISTORY:  No pertinent family history in first degree relatives        Allergies    codeine (Other (Moderate))  Depakote (Unknown)  Dilaudid (Short breath; Rash)  IV Contrast (Anaphylaxis)  losartan (Angioedema)  Risperdal (Other)  verapamil (Short breath; Angioedema)    Intolerances        SOCIAL HISTORY: see CM notes    [  ] Etoh  [  ] Smoking  [  ] Substance abuse     Home Environment:  [  ] Home Alone  [  ] Lives with Family  [  ] Home Health Aid    Dwelling:  [  ] Apartment  [  ] Private House  [  ] Adult Home  [  ] Skilled Nursing Facility      [  ] Short Term  [  ] Long Term  [  ] Stairs       Elevator [  ]    FUNCTIONAL STATUS PTA: (Check all that apply) see CM notes  Ambulation: [   ]Independent   [   ] Requires Assistance   [  ] Dependent     [  ] Non-Ambulatory       Assistive Device: [  ] SA Cane  [  ]  Q Cane  [  ] Walker  [  ]  Wheelchair  ADL : [  ] Independent    [   ] Requires Assistance    [  ]  Dependent       Vital Signs Last 24 Hrs  T(C): 36.2 (08 Jan 2021 14:32), Max: 36.7 (07 Jan 2021 18:53)  T(F): 97.1 (08 Jan 2021 14:32), Max: 98 (07 Jan 2021 18:53)  HR: 95 (08 Jan 2021 16:50) (70 - 95)  BP: 171/77 (08 Jan 2021 16:50) (144/95 - 180/78)  BP(mean): --  RR: 18 (08 Jan 2021 05:00) (18 - 18)  SpO2: 96% (08 Jan 2021 05:00) (96% - 98%)      PHYSICAL EXAM: referenced  GENERAL: NAD  HEENT: NCAT  CHEST/LUNG: CTAB  HEART: Regular rate and rhythm; s1 s2 appreciated, No murmurs, rubs, or gallops  ABDOMEN: Soft, Nontender, Nondistended; Bowel sounds present  EXTREMITIES: No LE edema b/l  SKIN: no rashes, no new lesions  NERVOUS SYSTEM:  Alert & Oriented X3    FUNCTIONAL STATUS:  Bed Mobility: [   ]Independent  [  ] Supervision  [ x ] Needs Assistance   [  ] N/A   Transfers: [   ] Independent  [  ] Supervision  [x  ] Needs Assistance  [  ]  N/A   Ambulation: [   ] Independent  [  ] Supervision  [ x ] Needs Assistance  [  ] N/A   ADL: [   ] Independent  [ x ] Requires Assistance  [  ] N/A       LABS:                        9.1    4.19  )-----------( 289      ( 08 Jan 2021 12:41 )             29.2     01-08    142  |  107  |  12  ----------------------------<  94  3.7   |  27  |  0.7    Ca    9.1      08 Jan 2021 12:41    TPro  6.2  /  Alb  3.3<L>  /  TBili  0.6  /  DBili  x   /  AST  50<H>  /  ALT  27  /  AlkPhos  96  01-08          RADIOLOGY & ADDITIONAL STUDIES:

## 2021-01-09 ENCOUNTER — TRANSCRIPTION ENCOUNTER (OUTPATIENT)
Age: 76
End: 2021-01-09

## 2021-01-09 VITALS
DIASTOLIC BLOOD PRESSURE: 84 MMHG | TEMPERATURE: 98 F | OXYGEN SATURATION: 98 % | HEART RATE: 83 BPM | RESPIRATION RATE: 18 BRPM | SYSTOLIC BLOOD PRESSURE: 189 MMHG

## 2021-01-09 DIAGNOSIS — J44.9 CHRONIC OBSTRUCTIVE PULMONARY DISEASE, UNSPECIFIED: ICD-10-CM

## 2021-01-09 LAB
ANION GAP SERPL CALC-SCNC: 11 MMOL/L — SIGNIFICANT CHANGE UP (ref 7–14)
BASOPHILS # BLD AUTO: 0.03 K/UL — SIGNIFICANT CHANGE UP (ref 0–0.2)
BASOPHILS NFR BLD AUTO: 0.7 % — SIGNIFICANT CHANGE UP (ref 0–1)
BUN SERPL-MCNC: 12 MG/DL — SIGNIFICANT CHANGE UP (ref 10–20)
CALCIUM SERPL-MCNC: 8.8 MG/DL — SIGNIFICANT CHANGE UP (ref 8.5–10.1)
CHLORIDE SERPL-SCNC: 108 MMOL/L — SIGNIFICANT CHANGE UP (ref 98–110)
CO2 SERPL-SCNC: 24 MMOL/L — SIGNIFICANT CHANGE UP (ref 17–32)
CREAT SERPL-MCNC: 0.7 MG/DL — SIGNIFICANT CHANGE UP (ref 0.7–1.5)
EOSINOPHIL # BLD AUTO: 0.18 K/UL — SIGNIFICANT CHANGE UP (ref 0–0.7)
EOSINOPHIL NFR BLD AUTO: 4.2 % — SIGNIFICANT CHANGE UP (ref 0–8)
GLUCOSE SERPL-MCNC: 93 MG/DL — SIGNIFICANT CHANGE UP (ref 70–99)
HCT VFR BLD CALC: 32.1 % — LOW (ref 37–47)
HGB BLD-MCNC: 9.9 G/DL — LOW (ref 12–16)
IMM GRANULOCYTES NFR BLD AUTO: 0.2 % — SIGNIFICANT CHANGE UP (ref 0.1–0.3)
LYMPHOCYTES # BLD AUTO: 1.3 K/UL — SIGNIFICANT CHANGE UP (ref 1.2–3.4)
LYMPHOCYTES # BLD AUTO: 30.4 % — SIGNIFICANT CHANGE UP (ref 20.5–51.1)
MCHC RBC-ENTMCNC: 30.8 G/DL — LOW (ref 32–37)
MCHC RBC-ENTMCNC: 30.8 PG — SIGNIFICANT CHANGE UP (ref 27–31)
MCV RBC AUTO: 100 FL — HIGH (ref 81–99)
MONOCYTES # BLD AUTO: 0.45 K/UL — SIGNIFICANT CHANGE UP (ref 0.1–0.6)
MONOCYTES NFR BLD AUTO: 10.5 % — HIGH (ref 1.7–9.3)
NEUTROPHILS # BLD AUTO: 2.31 K/UL — SIGNIFICANT CHANGE UP (ref 1.4–6.5)
NEUTROPHILS NFR BLD AUTO: 54 % — SIGNIFICANT CHANGE UP (ref 42.2–75.2)
NRBC # BLD: 0 /100 WBCS — SIGNIFICANT CHANGE UP (ref 0–0)
PLATELET # BLD AUTO: 288 K/UL — SIGNIFICANT CHANGE UP (ref 130–400)
POTASSIUM SERPL-MCNC: 4.1 MMOL/L — SIGNIFICANT CHANGE UP (ref 3.5–5)
POTASSIUM SERPL-SCNC: 4.1 MMOL/L — SIGNIFICANT CHANGE UP (ref 3.5–5)
RBC # BLD: 3.21 M/UL — LOW (ref 4.2–5.4)
RBC # FLD: 17.2 % — HIGH (ref 11.5–14.5)
SARS-COV-2 RNA SPEC QL NAA+PROBE: SIGNIFICANT CHANGE UP
SODIUM SERPL-SCNC: 143 MMOL/L — SIGNIFICANT CHANGE UP (ref 135–146)
WBC # BLD: 4.28 K/UL — LOW (ref 4.8–10.8)
WBC # FLD AUTO: 4.28 K/UL — LOW (ref 4.8–10.8)

## 2021-01-09 PROCEDURE — 99238 HOSP IP/OBS DSCHRG MGMT 30/<: CPT | Mod: CS

## 2021-01-09 RX ORDER — PANTOPRAZOLE SODIUM 20 MG/1
1 TABLET, DELAYED RELEASE ORAL
Qty: 0 | Refills: 0 | DISCHARGE
Start: 2021-01-09

## 2021-01-09 RX ORDER — TIOTROPIUM BROMIDE 18 UG/1
1 CAPSULE ORAL; RESPIRATORY (INHALATION)
Qty: 0 | Refills: 0 | DISCHARGE
Start: 2021-01-09

## 2021-01-09 RX ORDER — LAMOTRIGINE 25 MG/1
1 TABLET, ORALLY DISINTEGRATING ORAL
Qty: 0 | Refills: 0 | DISCHARGE
Start: 2021-01-09

## 2021-01-09 RX ORDER — SENNA PLUS 8.6 MG/1
2 TABLET ORAL
Qty: 0 | Refills: 0 | DISCHARGE
Start: 2021-01-09

## 2021-01-09 RX ORDER — DABIGATRAN ETEXILATE MESYLATE 150 MG/1
1 CAPSULE ORAL
Qty: 0 | Refills: 0 | DISCHARGE
Start: 2021-01-09

## 2021-01-09 RX ORDER — ASPIRIN/CALCIUM CARB/MAGNESIUM 324 MG
1 TABLET ORAL
Qty: 0 | Refills: 0 | DISCHARGE
Start: 2021-01-09

## 2021-01-09 RX ORDER — ATORVASTATIN CALCIUM 80 MG/1
1 TABLET, FILM COATED ORAL
Qty: 0 | Refills: 0 | DISCHARGE

## 2021-01-09 RX ORDER — ATORVASTATIN CALCIUM 80 MG/1
1 TABLET, FILM COATED ORAL
Qty: 0 | Refills: 0 | DISCHARGE
Start: 2021-01-09

## 2021-01-09 RX ORDER — LAMOTRIGINE 25 MG/1
1 TABLET, ORALLY DISINTEGRATING ORAL
Qty: 0 | Refills: 0 | DISCHARGE

## 2021-01-09 RX ORDER — ASPIRIN/CALCIUM CARB/MAGNESIUM 324 MG
1 TABLET ORAL
Qty: 0 | Refills: 0 | DISCHARGE

## 2021-01-09 RX ORDER — METOPROLOL TARTRATE 50 MG
1 TABLET ORAL
Qty: 0 | Refills: 0 | DISCHARGE
Start: 2021-01-09

## 2021-01-09 RX ORDER — CLOPIDOGREL BISULFATE 75 MG/1
1 TABLET, FILM COATED ORAL
Qty: 0 | Refills: 0 | DISCHARGE
Start: 2021-01-09

## 2021-01-09 RX ORDER — GABAPENTIN 400 MG/1
1 CAPSULE ORAL
Qty: 0 | Refills: 0 | DISCHARGE
Start: 2021-01-09

## 2021-01-09 RX ORDER — QUETIAPINE FUMARATE 200 MG/1
1 TABLET, FILM COATED ORAL
Qty: 0 | Refills: 0 | DISCHARGE
Start: 2021-01-09

## 2021-01-09 RX ORDER — ALBUTEROL 90 UG/1
1 AEROSOL, METERED ORAL
Qty: 0 | Refills: 0 | DISCHARGE
Start: 2021-01-09

## 2021-01-09 RX ORDER — AMLODIPINE BESYLATE 2.5 MG/1
1 TABLET ORAL
Qty: 0 | Refills: 0 | DISCHARGE

## 2021-01-09 RX ORDER — CLOPIDOGREL BISULFATE 75 MG/1
1 TABLET, FILM COATED ORAL
Qty: 0 | Refills: 0 | DISCHARGE

## 2021-01-09 RX ORDER — METOPROLOL TARTRATE 50 MG
1 TABLET ORAL
Qty: 0 | Refills: 0 | DISCHARGE

## 2021-01-09 RX ADMIN — CLOPIDOGREL BISULFATE 75 MILLIGRAM(S): 75 TABLET, FILM COATED ORAL at 11:46

## 2021-01-09 RX ADMIN — GABAPENTIN 300 MILLIGRAM(S): 400 CAPSULE ORAL at 05:39

## 2021-01-09 RX ADMIN — Medication 81 MILLIGRAM(S): at 11:47

## 2021-01-09 RX ADMIN — Medication 1 TABLET(S): at 11:47

## 2021-01-09 RX ADMIN — BUDESONIDE AND FORMOTEROL FUMARATE DIHYDRATE 2 PUFF(S): 160; 4.5 AEROSOL RESPIRATORY (INHALATION) at 14:10

## 2021-01-09 RX ADMIN — Medication 1 MILLIGRAM(S): at 11:47

## 2021-01-09 RX ADMIN — PANTOPRAZOLE SODIUM 40 MILLIGRAM(S): 20 TABLET, DELAYED RELEASE ORAL at 05:39

## 2021-01-09 RX ADMIN — GABAPENTIN 300 MILLIGRAM(S): 400 CAPSULE ORAL at 14:10

## 2021-01-09 RX ADMIN — POLYETHYLENE GLYCOL 3350 17 GRAM(S): 17 POWDER, FOR SOLUTION ORAL at 11:46

## 2021-01-09 RX ADMIN — Medication 0.5 MILLIGRAM(S): at 05:39

## 2021-01-09 RX ADMIN — Medication 100 MILLIGRAM(S): at 05:39

## 2021-01-09 RX ADMIN — MONTELUKAST 10 MILLIGRAM(S): 4 TABLET, CHEWABLE ORAL at 11:47

## 2021-01-09 RX ADMIN — DABIGATRAN ETEXILATE MESYLATE 150 MILLIGRAM(S): 150 CAPSULE ORAL at 05:39

## 2021-01-09 RX ADMIN — PREGABALIN 1000 MICROGRAM(S): 225 CAPSULE ORAL at 11:47

## 2021-01-09 RX ADMIN — CHLORHEXIDINE GLUCONATE 15 MILLILITER(S): 213 SOLUTION TOPICAL at 05:39

## 2021-01-09 RX ADMIN — Medication 500 MILLIGRAM(S): at 11:46

## 2021-01-09 RX ADMIN — LAMOTRIGINE 100 MILLIGRAM(S): 25 TABLET, ORALLY DISINTEGRATING ORAL at 11:47

## 2021-01-09 NOTE — DISCHARGE NOTE PROVIDER - NSDCMRMEDTOKEN_GEN_ALL_CORE_FT
acetaminophen 325 mg oral tablet: 2 tab(s) orally every 6 hours, As Needed for pain   albuterol 90 mcg/inh inhalation aerosol: 1 puff(s) inhaled every 4 hours, As needed, Shortness of Breath  amLODIPine 2.5 mg oral tablet: 1 tab(s) orally once a day  aspirin 81 mg oral tablet, chewable: 1 tab(s) orally once a day  atorvastatin 20 mg oral tablet: 1 tab(s) orally once a day (at bedtime)  budesonide-formoterol 160 mcg-4.5 mcg/inh inhalation aerosol: 2 puff(s) inhaled 2 times a day  clopidogrel 75 mg oral tablet: 1 tab(s) orally once a day  dabigatran 150 mg oral capsule: 1 cap(s) orally every 12 hours  furosemide 40 mg oral tablet: 1 tab(s) orally once a day  gabapentin 300 mg oral capsule: 1 cap(s) orally 3 times a day  lamoTRIgine 100 mg oral tablet: 1 tab(s) orally once a day  LORazepam 0.5 mg oral tablet: 1 tab(s) orally 2 times a day  metoprolol tartrate 100 mg oral tablet: 1 tab(s) orally every 12 hours  montelukast 10 mg oral tablet: 1 tab(s) orally once a day  Multiple Vitamins oral tablet: 1 tab(s) orally once a day  pantoprazole 40 mg oral delayed release tablet: 1 tab(s) orally once a day (before a meal)  Pepcid 20 mg oral tablet: 1 tab(s) orally once a day  QUEtiapine 200 mg oral tablet: 1 tab(s) orally once a day (at bedtime)  senna oral tablet: 2 tab(s) orally once a day (at bedtime)  tiotropium 18 mcg inhalation capsule: 1 cap(s) inhaled once a day   acetaminophen 325 mg oral tablet: 2 tab(s) orally every 6 hours, As Needed for pain   albuterol 90 mcg/inh inhalation aerosol: 1 puff(s) inhaled every 4 hours, As needed, Shortness of Breath  amLODIPine 5 mg oral tablet: 1 tab(s) orally once a day  aspirin 81 mg oral tablet, chewable: 1 tab(s) orally once a day  atorvastatin 20 mg oral tablet: 1 tab(s) orally once a day (at bedtime)  budesonide-formoterol 160 mcg-4.5 mcg/inh inhalation aerosol: 2 puff(s) inhaled 2 times a day  clopidogrel 75 mg oral tablet: 1 tab(s) orally once a day  dabigatran 150 mg oral capsule: 1 cap(s) orally every 12 hours  gabapentin 300 mg oral capsule: 1 cap(s) orally 3 times a day  lamoTRIgine 100 mg oral tablet: 1 tab(s) orally once a day  LORazepam 0.5 mg oral tablet: 1 tab(s) orally 2 times a day  metoprolol tartrate 100 mg oral tablet: 1 tab(s) orally every 12 hours  montelukast 10 mg oral tablet: 1 tab(s) orally once a day  Multiple Vitamins oral tablet: 1 tab(s) orally once a day  pantoprazole 40 mg oral delayed release tablet: 1 tab(s) orally once a day (before a meal)  Pepcid 20 mg oral tablet: 1 tab(s) orally once a day  QUEtiapine 200 mg oral tablet: 1 tab(s) orally once a day (at bedtime)  senna oral tablet: 2 tab(s) orally once a day (at bedtime)  tiotropium 18 mcg inhalation capsule: 1 cap(s) inhaled once a day

## 2021-01-09 NOTE — DISCHARGE NOTE NURSING/CASE MANAGEMENT/SOCIAL WORK - PATIENT PORTAL LINK FT
You can access the FollowMyHealth Patient Portal offered by [facility] by registering at the following website: [email or web address]. By joining [email or web address]’s FollowMyHealth portal, you will also be able to view your health information using other applications (apps) compatible with our system.

## 2021-01-09 NOTE — DISCHARGE NOTE PROVIDER - CARE PROVIDERS DIRECT ADDRESSES
,DirectAddress_Unknown,[email or web address] ,[email or web address],[email or web address]

## 2021-01-09 NOTE — CHART NOTE - NSCHARTNOTEFT_GEN_A_CORE
I have personally seen and examined this patient.  I have fully participated in the care of this patient.  I have reviewed pertinent clinical information, including history, physical exam, plan and note.   I have reviewed relevant imaging and diagnostic studies personally. I agree with resident note above and plan of care, edited and corrected where applicable.     Med rec reviewed with senior resident. Patient will be resumed on Calcium Channel Blocker (Norvasc) at 5 mg daily due to uncontrolled HTN. The diuretic (Lasix) meanwhile will be used on PRN basis only.     Case discussed with senior resident assigned.     May DC to short-term physical rehabilitation at a skilled nursing facility as scheduled.

## 2021-01-09 NOTE — DISCHARGE NOTE PROVIDER - CARE PROVIDER_API CALL
Hunter Chavez)  Critical Care Medicine; Internal Medicine; Pulmonary Disease  [address], [address]  [place] [address]  Phone: [phone]  Fax: [phone]  Follow Up Time:     Musa Hoffmann)  Cardiovascular Disease; Internal Medicine; Interventional Cardiology  [address] [place] [address]  Phone: [phone]  Fax: [phone]  Follow Up Time:     Filiberto Gallegos  NEUROLOGY  [address]  [place] [address]  Phone: [phone]  Fax: [phone]  Follow Up Time:    Hunter Chavez)  Critical Care Medicine; Internal Medicine; Pulmonary Disease  [address], [address]  [place] [address]  Phone: [phone]  Fax: [phone]  Follow Up Time:     Musa Hoffmann)  Cardiovascular Disease; Internal Medicine; Interventional Cardiology  [address] [place] [address]  Phone: [phone]  Fax: [phone]  Follow Up Time:     Filiberto Gallegos  NEUROLOGY  [address], [address]  [place] [address]  Phone: [phone]  Fax: [phone]  Follow Up Time:     Albert Miller)  Otolaryngology  [address] [place] [address]  Phone: [phone]  Fax: [phone]  Follow Up Time:

## 2021-01-09 NOTE — DISCHARGE NOTE PROVIDER - HOSPITAL COURSE
75 year old woman with medical history of COPD on 5L home oxygen, paroxysmal afib on pradaxa, carotid stenosis (L 80%, R 40%) s/p L carotid stenting recently , HFpEF (EF 70% Dec 2020), HTN, HLD, bipolar disorder, left parotid mass, recently admitted between 12/7-12/11 for COVID PNA presented here for acute respiratory failure secondary to COVID-19 vs COPD acute exacerbation. Patient was intubated on 12/29 and self extubated on 1/3. She is s/p 12/30 Tocilizumab 400mg x1,steroids and RDV. She has finished abx (s/p 12 days cefepime) and is now stable for d/c to next level of care which is STR.    Other PMH includes:-  #Paroxysmal atrial fibrillation  - Continue with Pradaxa 150mg PO BID  - ASA 81 mg and Plavix recently added after a recent TIA last month.   - c/w Metoprolol for rate control    #Bipolar Disorder  -Continue home meds Lamictal 100mg PO daily, Seroquel 200mg at bedtime, and Ativan 0.5mg PO twice daily PRN  - may add Seroquel 50 mg q8h prn for breakthrough anxiety while hospitalized    # Left ICA s/p TCAR (transcarotid artery revascularization) 12/04 with TIA last admission  - c/w plavix, ASA and follow up with Neurology outpatient     #Left intra parotid mass   - Patient will need out patient biopsy     # Chronic HFpEF  - ECHO (12/2/2020): Hyperdynamic global left ventricular systolic function; EF of 70 %; Moderately increased LV wall thickness. Grade I diastolic dysfunction.    # HTN: c/w metoprolol     #HLD   -On atorvastatin

## 2021-01-09 NOTE — CHART NOTE - NSCHARTNOTESELECT_GEN_ALL_CORE
Event Note
COVID Communication Team/Event Note
Event Note
Rounds/Event Note
Rounds/Event Note
comm/Event Note

## 2021-01-09 NOTE — DISCHARGE NOTE PROVIDER - NSDCCPCAREPLAN_GEN_ALL_CORE_FT
PRINCIPAL DISCHARGE DIAGNOSIS  Diagnosis: COVID-19  Assessment and Plan of Treatment: You were treated for COVID-19 infection with Acute exacerbation of COPD with Steroids, Remdesevir and Tocilizumab.   Currently you are at your baseline of 5 Litres of oxygen saturating well. Continue with home medications as prescribed and follow up with PMD and pulmonology.      SECONDARY DISCHARGE DIAGNOSES  Diagnosis: Transient ischemic attack  Assessment and Plan of Treatment: Continue with anticoagulation and blood thinner as directed and follow up with Neurology outpatient.     PRINCIPAL DISCHARGE DIAGNOSIS  Diagnosis: COVID-19  Assessment and Plan of Treatment: You were treated for COVID-19 infection with Acute exacerbation of COPD with Steroids, Remdesevir and Tocilizumab.   Currently you are at your baseline of 5 Litres of oxygen saturating well. Continue with home medications as prescribed and follow up with PMD and pulmonology.      SECONDARY DISCHARGE DIAGNOSES  Diagnosis: Transient ischemic attack  Assessment and Plan of Treatment: Continue with anticoagulation and blood thinner as directed and follow up with Neurology outpatient.  Patient is on triple anticoagulation as per neurology. She is at high risk of bleeding. Consider follow up with neurology outpatient when she can be discontinued on one of the antiplatelets.     PRINCIPAL DISCHARGE DIAGNOSIS  Diagnosis: COVID-19  Assessment and Plan of Treatment: You were treated for COVID-19 infection with Acute exacerbation of COPD with Steroids, Remdesevir and Tocilizumab.   Currently you are at your baseline of 5 Litres of oxygen saturating well. Continue with home medications as prescribed and follow up with PMD and pulmonology.      SECONDARY DISCHARGE DIAGNOSES  Diagnosis: Parotid mass  Assessment and Plan of Treatment: Follow up outpatient with ENT for possible evaluation of Left sided Parotid mass.    Diagnosis: Transient ischemic attack  Assessment and Plan of Treatment: Continue with anticoagulation and blood thinner as directed and follow up with Neurology outpatient.  Patient is on triple anticoagulation as per neurology. She is at high risk of bleeding. Consider follow up with neurology outpatient when she can be discontinued on one of the antiplatelets.     PRINCIPAL DISCHARGE DIAGNOSIS  Diagnosis: COVID-19  Assessment and Plan of Treatment: You were treated for COVID-19 infection with Acute exacerbation of COPD with Steroids, Remdesevir and Tocilizumab.   Currently you are at your baseline of 5 Litres of oxygen saturating well. Continue with home medications as prescribed and follow up with PMD and pulmonology.      SECONDARY DISCHARGE DIAGNOSES  Diagnosis: Advanced COPD  Assessment and Plan of Treatment: Currently Stable on Home 5 Litres. Patient is stable. Continue with home meds as recommended. If there are signs of fluid overload like worsening lower extremity edema or congestion in the lung examination, may get lasix PO as needed intermittently.    Diagnosis: Parotid mass  Assessment and Plan of Treatment: Follow up outpatient with ENT for possible evaluation of Left sided Parotid mass.    Diagnosis: Transient ischemic attack  Assessment and Plan of Treatment: Continue with anticoagulation and blood thinner as directed and follow up with Neurology outpatient.  Patient is on triple anticoagulation as per neurology. She is at high risk of bleeding. Consider follow up with neurology outpatient when she can be discontinued on one of the antiplatelets.

## 2021-03-10 NOTE — ED PROVIDER NOTE - DISPOSITION TYPE
What Is The Reason For Today's Visit?: History of Non-Melanoma Skin Cancer How Many Skin Cancers Have You Had?: more than one When Was Your Last Cancer Diagnosed?: 2020 ADMIT

## 2021-03-11 ENCOUNTER — APPOINTMENT (OUTPATIENT)
Dept: VASCULAR SURGERY | Facility: CLINIC | Age: 76
End: 2021-03-11
Payer: MEDICARE

## 2021-03-11 VITALS — DIASTOLIC BLOOD PRESSURE: 77 MMHG | SYSTOLIC BLOOD PRESSURE: 150 MMHG

## 2021-03-11 VITALS — WEIGHT: 180 LBS | TEMPERATURE: 98 F | BODY MASS INDEX: 32.92 KG/M2

## 2021-03-11 PROCEDURE — 93880 EXTRACRANIAL BILAT STUDY: CPT

## 2021-03-11 PROCEDURE — 99213 OFFICE O/P EST LOW 20 MIN: CPT

## 2021-03-11 NOTE — HISTORY OF PRESENT ILLNESS
[FreeTextEntry1] : 74 y/o female with h/o COPD on home oxygen, CHF, admitted to [facility] for TIA, symptoms of slurred speech, that has now resolved. Work up revealed left ICA high-grade stenosis noted on carotid duplex.\par \par She underwent left ICA angioplasty and stent in December 2020.

## 2021-03-11 NOTE — ASSESSMENT
[FreeTextEntry1] : 74 y/o female with h/o COPD on home oxygen, CHF, admitted to [facility] for TIA, symptoms of slurred speech, that has now resolved. Work up revealed left ICA high-grade stenosis and she underwent Transcarotid artery revascularization with stent in December 2020.\par \par Carotid duplex today showed <50% right ICA stenosis and patent left ICA stent. There was an incidental finding of left anterior cervical lymphadenopathy 2.7 cm.\par \par I have informed her of the test results, and the cervical lymphadenopathy maybe related to her COVID infection and URI symptoms.\par \par I will see her back in six months time.\par \par

## 2021-03-11 NOTE — DATA REVIEWED
[FreeTextEntry1] : I performed a carotid duplex which was medically necessary to evaluate for patency of the carotid stent. It showed <50% right ICA stenosis and patent left ICA stent. There was an incidental finding of left anterior cervical lymphadenopathy 2.7 cm.\par

## 2021-03-20 NOTE — PHYSICAL THERAPY INITIAL EVALUATION ADULT - GENERAL OBSERVATIONS, REHAB EVAL
13:15-13:38 Chart reviewed. Pt encountered reclined in chair,  may be seen by Physical Therapist as confirmed with Nurse. Patient denied pain at rest and would like to walk; +tele; +dressing (R) groin
left finger amputation/replantation

## 2021-03-23 ENCOUNTER — APPOINTMENT (OUTPATIENT)
Dept: PULMONOLOGY | Facility: CLINIC | Age: 76
End: 2021-03-23
Payer: MEDICARE

## 2021-03-23 VITALS
WEIGHT: 182 LBS | HEART RATE: 68 BPM | SYSTOLIC BLOOD PRESSURE: 116 MMHG | RESPIRATION RATE: 15 BRPM | DIASTOLIC BLOOD PRESSURE: 68 MMHG | HEIGHT: 62 IN | BODY MASS INDEX: 33.49 KG/M2

## 2021-03-23 PROCEDURE — 99214 OFFICE O/P EST MOD 30 MIN: CPT | Mod: CS,25

## 2021-03-23 PROCEDURE — 71046 X-RAY EXAM CHEST 2 VIEWS: CPT

## 2021-03-23 RX ORDER — TRAMADOL HYDROCHLORIDE 50 MG/1
50 TABLET, COATED ORAL 4 TIMES DAILY
Refills: 0 | Status: COMPLETED | COMMUNITY
Start: 2019-08-22 | End: 2021-03-23

## 2021-03-23 RX ORDER — FLUTICASONE FUROATE, UMECLIDINIUM BROMIDE AND VILANTEROL TRIFENATATE 100; 62.5; 25 UG/1; UG/1; UG/1
100-62.5-25 POWDER RESPIRATORY (INHALATION)
Refills: 0 | Status: COMPLETED | COMMUNITY
End: 2021-03-23

## 2021-03-23 RX ORDER — BUDESONIDE AND FORMOTEROL FUMARATE DIHYDRATE 160; 4.5 UG/1; UG/1
AEROSOL RESPIRATORY (INHALATION)
Refills: 0 | Status: COMPLETED | COMMUNITY
End: 2021-03-23

## 2021-03-23 RX ORDER — MONTELUKAST 10 MG/1
10 TABLET, FILM COATED ORAL
Refills: 0 | Status: COMPLETED | COMMUNITY
Start: 2019-08-22 | End: 2021-03-23

## 2021-03-23 RX ORDER — SILVER SULFADIAZINE 10 MG/G
1 CREAM TOPICAL TWICE DAILY
Qty: 1 | Refills: 3 | Status: COMPLETED | COMMUNITY
Start: 2018-12-18 | End: 2021-03-23

## 2021-03-23 RX ORDER — ESCITALOPRAM OXALATE 10 MG/1
10 TABLET ORAL TWICE DAILY
Refills: 0 | Status: COMPLETED | COMMUNITY
Start: 2019-08-22 | End: 2021-03-23

## 2021-03-23 RX ORDER — BUDESONIDE 1 MG/2ML
INHALANT ORAL
Refills: 0 | Status: COMPLETED | COMMUNITY
End: 2021-03-23

## 2021-03-23 RX ORDER — CLOPIDOGREL BISULFATE 75 MG/1
75 TABLET, FILM COATED ORAL
Qty: 30 | Refills: 1 | Status: DISCONTINUED | COMMUNITY
Start: 2020-11-12 | End: 2021-03-23

## 2021-03-23 NOTE — PROCEDURE
[FreeTextEntry1] : c\par CXR today in office\par improve alveolar infiltrates compared to last hospital CXR.\par

## 2021-03-23 NOTE — ASSESSMENT
[FreeTextEntry1] : Stress compliance with inhalers\par PRN albuterol\par ICS/LABA\par needs PFT\par F/U 6 months\par \par \par New supplies\par Continue the use of CPAP\par Weight loss\par F/U in 6 months\par The patient is benefitting from the CPAP\par Stress the need maintain compliance  with the CPAP.\par \par

## 2021-03-23 NOTE — HISTORY OF PRESENT ILLNESS
[Stable] : stable [Difficulty Breathing During Exertion] : stable dyspnea on exertion [Feelings Of Weakness On Exertion] : stable exercise intolerance [Cough] : denies coughing [Coughing Up Sputum] : denies coughing up sputum [Wheezing] : denies wheezing [Regional Soft Tissue Swelling Both Lower Extremities] : denies lower extremity edema [Adherent] : the patient is adherent with ~his/her~ medication regimen [PFTs] : pulmonary function tests [Follow-Up - Routine Clinic] : a routine clinic follow-up of [Witnessed Gasping During Sleep] : witnessed gasping during sleep [Snoring] : snoring [Morning Headaches] : morning headaches [Shortness of Breath] : shortness of breath [CPAP] : CPAP [Poor Compliance] : poor compliance with treatment [Poor Tolerance] : poor tolerance of treatment [Poor Symptom Control] : poor symptom control [Fever] : no fever [de-identified] : was off the CPAP due to the COVID

## 2021-03-23 NOTE — PHYSICAL EXAM
[No Acute Distress] : no acute distress [Normal Oropharynx] : normal oropharynx [Normal Appearance] : normal appearance [No Neck Mass] : no neck mass [Normal Rate/Rhythm] : normal rate/rhythm [Normal S1, S2] : normal s1, s2 [No Murmurs] : no murmurs [No Resp Distress] : no resp distress [Clear to Auscultation Bilaterally] : clear to auscultation bilaterally [No Abnormalities] : no abnormalities [Benign] : benign [Normal Gait] : normal gait [No Clubbing] : no clubbing [No Cyanosis] : no cyanosis [No Edema] : no edema [FROM] : FROM [Normal Color/ Pigmentation] : normal color/ pigmentation [No Focal Deficits] : no focal deficits [Oriented x3] : oriented x3 [Normal Affect] : normal affect [TextBox_68] : decreased

## 2021-05-31 NOTE — PHYSICAL THERAPY INITIAL EVALUATION ADULT - WEIGHT-BEARING RESTRICTIONS: GAIT, REHAB EVAL
Received new oxygen intake via fax around 12:30pm on 8/1/19. Reviewed the chart, and need some things edited before we can set the patient up. Called and left a message for the triage line at the cancer clinic.   12:50pm- Kate called me back. We talked about needing the notes edited and the order updated as well. She was going to work on getting those completed. Told her I would watch the chart for the edits.   1:25pm- Kate called back to let me know everything should be in that I need. Reviewed the chart with her on the phone and confirmed we have everything we need. Told her I would call the patient to get her set up.   1:30pm- Spoke with patient and offered choice, she is okay with [facility] setting her up. Scheduled delivery of POC for htis afternoon.      
full weight-bearing

## 2021-06-09 PROBLEM — Z87.09 HISTORY OF PNEUMOTHORAX: Status: RESOLVED | Noted: 2021-06-09 | Resolved: 2021-06-09

## 2021-06-09 PROBLEM — Z82.49 FAMILY HISTORY OF ESSENTIAL HYPERTENSION: Status: ACTIVE | Noted: 2021-06-09

## 2021-06-09 PROBLEM — Z82.49 FAMILY HISTORY OF ACUTE MYOCARDIAL INFARCTION: Status: ACTIVE | Noted: 2021-06-09

## 2021-06-10 ENCOUNTER — APPOINTMENT (OUTPATIENT)
Dept: CARDIOLOGY | Facility: CLINIC | Age: 76
End: 2021-06-10
Payer: OTHER GOVERNMENT

## 2021-06-10 VITALS
DIASTOLIC BLOOD PRESSURE: 78 MMHG | BODY MASS INDEX: 34.6 KG/M2 | WEIGHT: 188 LBS | HEIGHT: 62 IN | SYSTOLIC BLOOD PRESSURE: 124 MMHG

## 2021-06-10 DIAGNOSIS — Z82.49 FAMILY HISTORY OF ISCHEMIC HEART DISEASE AND OTHER DISEASES OF THE CIRCULATORY SYSTEM: ICD-10-CM

## 2021-06-10 DIAGNOSIS — Z87.09 PERSONAL HISTORY OF OTHER DISEASES OF THE RESPIRATORY SYSTEM: ICD-10-CM

## 2021-06-10 DIAGNOSIS — I50.9 HEART FAILURE, UNSPECIFIED: ICD-10-CM

## 2021-06-10 PROCEDURE — 99213 OFFICE O/P EST LOW 20 MIN: CPT

## 2021-06-10 RX ORDER — IPRATROPIUM BROMIDE AND ALBUTEROL SULFATE 2.5; .5 MG/3ML; MG/3ML
0.5-2.5 (3) SOLUTION RESPIRATORY (INHALATION) 4 TIMES DAILY
Refills: 0 | Status: ACTIVE | COMMUNITY
Start: 2019-08-22

## 2021-06-10 NOTE — PHYSICAL EXAM
[Normal Appearance] : was normal in appearance [5th Left ICS - MCL] : palpated at the 5th LICS in the midclavicular line [Normal Rate] : normal [Rhythm Regular] : regular [Normal S1] : normal S1 [Normal S2] : normal S2 [I] : a grade 1 [___ +] : bilateral [unfilled]U+ pitting edema to the ankles [2+] : left 2+ [No Abnormalities] : the abdominal aorta was not enlarged and no bruit was heard [Normal] : alert and oriented, normal memory [Normal Venous Pressure] : normal venous pressure [JVP Elevated ___cm] : the JVP was not elevated [S3] : no S3 [S4] : no S4 [Right Carotid Bruit] : no bruit heard over the right carotid [Left Carotid Bruit] : no bruit heard over the left carotid

## 2021-06-10 NOTE — HISTORY OF PRESENT ILLNESS
[FreeTextEntry1] : Symptoms:  dyspnea on exertion and fatigue, but no chest pain and no edema. \par \par \par \par \par Symptoms: stable dyspnea on exertion and stable exercise intolerance. \par \par Symptoms:  no confusion. Associated symptoms:  no chest pain and no claudication. \par \par Current treatment includes CPAP therapy. Not using c-pap\par \par \par \par \par Symptoms: stable dyspnea, denies chest pain and denies intermittent leg claudication. \par \par \par \par \par Symptoms: Denied: claudication, chest pain, chest pressure, dizziness, dyspnea, dyspnea at rest, edema, irregular heartbeat, lightheadedness, orthopnea, shortness of breath, syncope, exertional syncope, near syncope and palpitations. Present: exertional dyspnea. \par \par Lifestyle and Disease Management: Diet: She does not have a healthy diet. Weight Issues: She has weight concerns. Exercise: She does not exercise regularly. Smoking: She does not use tobacco. Alcohol: She denies alcohol use

## 2021-06-10 NOTE — REVIEW OF SYSTEMS
[Joint Pain] : joint pain [Negative] : Heme/Lymph [Fever] : no fever [Blurry Vision] : no blurred vision [Hearing Loss] : no hearing loss [Sore Throat] : no sore throat [SOB] : no shortness of breath [Chest Discomfort] : no chest discomfort [Palpitations] : no palpitations [Cough] : no cough [Wheezing] : no wheezing [Abdominal Pain] : no abdominal pain [Diarrhea] : diarrhea [Constipation] : no constipation [Dysuria] : no dysuria [Rash] : no rash [Skin Lesions] : no skin lesions [Dizziness] : no dizziness [FreeTextEntry9] : c/o mild left hand and right hip pain

## 2021-06-10 NOTE — DISCUSSION/SUMMARY
[FreeTextEntry1] : \par \par History htn copd emphysema inga sees now Alexander.  . In hosp 11/18 hypotesion.. Was on verapramil Now off. Use to smoke. LVH EF 55%. Told need 2 gm na diet. Now edema resolved.  Had TIA. On home O2. She 12/20 covid pneumonia . She was on vent. Had neg stress in [place] 10/20. L carotid done by maddie 12/5/20.  Told resume  weight loss. ? pulmonary rehab.. Memory better.  Told try walk

## 2021-06-17 ENCOUNTER — APPOINTMENT (OUTPATIENT)
Dept: CARDIOLOGY | Facility: CLINIC | Age: 76
End: 2021-06-17

## 2021-06-18 NOTE — PROGRESS NOTE ADULT - SUBJECTIVE AND OBJECTIVE BOX
SHAUN COATES 75y Female  MRN#: [id number]   CODE STATUS: Full code      SUBJECTIVE  Patient is a 75y old Female who presents with a chief complaint of chest pain weakness (04 Jan 2021 06:57)  Currently admitted to medicine with the primary diagnosis of Septic shock    no overnight complaints, patient is on NC: 5L, saturating 95%, weaned it off from NRB      OBJECTIVE  PAST MEDICAL & SURGICAL HISTORY  Falls    Congestive heart failure    Transient ischemic attack    FLAVIO on CPAP    Bipolar 1 disorder    Allergic reaction    PVD (peripheral vascular disease)    Other emphysema    Other cardiomyopathy    TIA (transient ischemic attack)    COPD (chronic obstructive pulmonary disease)    Depression    Hypertension    History of cholecystectomy      ALLERGIES:  codeine (Other (Moderate))  Depakote (Unknown)  Dilaudid (Short breath; Rash)  IV Contrast (Anaphylaxis)  losartan (Angioedema)  Risperdal (Other)  verapamil (Short breath; Angioedema)    MEDICATIONS:  MEDICATIONS  (STANDING):  ascorbic acid 500 milliGRAM(s) Oral daily  aspirin  chewable 81 milliGRAM(s) Oral daily  atorvastatin 20 milliGRAM(s) Oral at bedtime  budesonide 160 MICROgram(s)/formoterol 4.5 MICROgram(s) Inhaler 2 Puff(s) Inhalation two times a day  chlorhexidine 0.12% Liquid 15 milliLiter(s) Oral Mucosa every 12 hours  clopidogrel Tablet 75 milliGRAM(s) Oral daily  enoxaparin Injectable 80 milliGRAM(s) SubCutaneous two times a day  gabapentin 300 milliGRAM(s) Oral three times a day  lamoTRIgine 100 milliGRAM(s) Oral daily  LORazepam     Tablet 0.5 milliGRAM(s) Oral two times a day  metoprolol tartrate 100 milliGRAM(s) Oral every 12 hours  montelukast 10 milliGRAM(s) Oral daily  multivitamin 1 Tablet(s) Oral daily  pantoprazole    Tablet 40 milliGRAM(s) Oral before breakfast  polyethylene glycol 3350 17 Gram(s) Oral daily  QUEtiapine 200 milliGRAM(s) Oral at bedtime  senna 2 Tablet(s) Oral at bedtime  tiotropium 18 MICROgram(s) Capsule 1 Capsule(s) Inhalation daily    MEDICATIONS  (PRN):  acetaminophen  Suppository .. 650 milliGRAM(s) Rectal every 6 hours PRN Temp greater or equal to 38C (100.4F)  ALBUTerol    90 MICROgram(s) HFA Inhaler 1 Puff(s) Inhalation every 4 hours PRN Shortness of Breath  aluminum hydroxide/magnesium hydroxide/simethicone Suspension 30 milliLiter(s) Oral every 4 hours PRN Dyspepsia  guaifenesin/dextromethorphan  Syrup 10 milliLiter(s) Oral every 6 hours PRN Cough        VITAL SIGNS: Last 24 Hours  Vital Signs Last 24 Hrs  T(C): 36.3 (06 Jan 2021 08:02), Max: 36.9 (05 Jan 2021 15:37)  T(F): 97.4 (06 Jan 2021 08:02), Max: 98.4 (05 Jan 2021 15:37)  HR: 71 (06 Jan 2021 08:02) (71 - 93)  BP: 115/78 (06 Jan 2021 08:02) (113/56 - 160/74)  BP(mean): 86 (05 Jan 2021 12:00) (86 - 86)  RR: 20 (06 Jan 2021 08:02) (20 - 20)  SpO2: 97% (06 Jan 2021 08:02) (90% - 99%)    LABS:                          8.3    3.60  )-----------( 249      ( 06 Jan 2021 04:30 )             27.6                                   8.2    3.74  )-----------( 239      ( 05 Jan 2021 03:00 )             26.9                 8.1    3.48  )-----------( 227      ( 04 Jan 2021 05:20 )             26.9     01-06    146  |  108  |  16  ----------------------------<  90  3.3<L>   |  31  |  0.6<L>    Ca    8.4<L>      06 Jan 2021 04:30  Phos  2.8     01-06  Mg     2.1     01-06    TPro  5.2<L>  /  Alb  2.9<L>  /  TBili  0.6  /  DBili  x   /  AST  45<H>  /  ALT  27  /  AlkPhos  88  01-06      01-05    145  |  107  |  18  ----------------------------<  93  3.5   |  30  |  0.6<L>    Ca    8.6      05 Jan 2021 03:00  Phos  3.2     01-05  Mg     1.9     01-05    TPro  5.6<L>  /  Alb  3.0<L>  /  TBili  0.5  /  DBili  x   /  AST  70<H>  /  ALT  38  /  AlkPhos  97  01-05 01-04    145  |  110  |  18  ----------------------------<  90  3.5   |  27  |  0.7    Ca    8.5      04 Jan 2021 05:20  Phos  2.8     01-04  Mg     1.9     01-04    TPro  5.2<L>  /  Alb  2.9<L>  /  TBili  0.4  /  DBili  <0.2  /  AST  94<H>  /  ALT  39  /  AlkPhos  93  01-04        ABG - ( 04 Jan 2021 03:31 )  pH, Arterial: 7.36  pH, Blood: x     /  pCO2: 52    /  pO2: 134   / HCO3: 29    / Base Excess: 3.1   /  SaO2: 99                Lactate, Blood: 0.8 mmol/L (01-03-21 @ 20:49)          RADIOLOGY:      CT Head No Cont (12.26.20 @ 06:38) >    IMPRESSION:    No CT evidence of acute territorial infarct or other acute intracranial pathology.       SHAUN COATES 75y Female  MRN#: [id number]   CODE STATUS: Full code      SUBJECTIVE  Patient is a 75y old Female who presents with a chief complaint of chest pain weakness (04 Jan 2021 06:57)  Currently admitted to medicine with the primary diagnosis of Septic shock    no overnight complaints, patient is on NC: 3L, saturating 95%,      OBJECTIVE  PAST MEDICAL & SURGICAL HISTORY  Falls    Congestive heart failure    Transient ischemic attack    FLAVIO on CPAP    Bipolar 1 disorder    Allergic reaction    PVD (peripheral vascular disease)    Other emphysema    Other cardiomyopathy    TIA (transient ischemic attack)    COPD (chronic obstructive pulmonary disease)    Depression    Hypertension    History of cholecystectomy      ALLERGIES:  codeine (Other (Moderate))  Depakote (Unknown)  Dilaudid (Short breath; Rash)  IV Contrast (Anaphylaxis)  losartan (Angioedema)  Risperdal (Other)  verapamil (Short breath; Angioedema)    MEDICATIONS:  MEDICATIONS  (STANDING):  ascorbic acid 500 milliGRAM(s) Oral daily  aspirin  chewable 81 milliGRAM(s) Oral daily  atorvastatin 20 milliGRAM(s) Oral at bedtime  budesonide 160 MICROgram(s)/formoterol 4.5 MICROgram(s) Inhaler 2 Puff(s) Inhalation two times a day  chlorhexidine 0.12% Liquid 15 milliLiter(s) Oral Mucosa every 12 hours  clopidogrel Tablet 75 milliGRAM(s) Oral daily  cyanocobalamin 1000 MICROGram(s) Oral daily  enoxaparin Injectable 80 milliGRAM(s) SubCutaneous two times a day  folic acid 1 milliGRAM(s) Oral daily  gabapentin 300 milliGRAM(s) Oral three times a day  lamoTRIgine 100 milliGRAM(s) Oral daily  LORazepam     Tablet 0.5 milliGRAM(s) Oral two times a day  metoprolol tartrate 100 milliGRAM(s) Oral every 12 hours  montelukast 10 milliGRAM(s) Oral daily  multivitamin 1 Tablet(s) Oral daily  pantoprazole    Tablet 40 milliGRAM(s) Oral before breakfast  polyethylene glycol 3350 17 Gram(s) Oral daily  QUEtiapine 200 milliGRAM(s) Oral at bedtime  senna 2 Tablet(s) Oral at bedtime  tiotropium 18 MICROgram(s) Capsule 1 Capsule(s) Inhalation daily    MEDICATIONS  (PRN):  acetaminophen  Suppository .. 650 milliGRAM(s) Rectal every 6 hours PRN Temp greater or equal to 38C (100.4F)  ALBUTerol    90 MICROgram(s) HFA Inhaler 1 Puff(s) Inhalation every 4 hours PRN Shortness of Breath  aluminum hydroxide/magnesium hydroxide/simethicone Suspension 30 milliLiter(s) Oral every 4 hours PRN Dyspepsia  guaifenesin/dextromethorphan  Syrup 10 milliLiter(s) Oral every 6 hours PRN Cough        VITAL SIGNS: Last 24 Hours  Vital Signs Last 24 Hrs  T(C): 36.7 (07 Jan 2021 08:39), Max: 37.3 (06 Jan 2021 23:40)  T(F): 98 (07 Jan 2021 08:39), Max: 99.1 (06 Jan 2021 23:40)  HR: 75 (07 Jan 2021 08:39) (75 - 86)  BP: 155/70 (07 Jan 2021 08:39) (118/58 - 155/70)  BP(mean): --  RR: 20 (07 Jan 2021 08:39) (20 - 22)  SpO2: 97% (07 Jan 2021 08:39) (93% - 98%)    LABS:                          8.3    3.60  )-----------( 249      ( 06 Jan 2021 04:30 )             27.6                           8.3    3.60  )-----------( 249      ( 06 Jan 2021 04:30 )             27.6                                   8.2    3.74  )-----------( 239      ( 05 Jan 2021 03:00 )             26.9                 8.1    3.48  )-----------( 227      ( 04 Jan 2021 05:20 )             26.9     01-06    146  |  108  |  16  ----------------------------<  90  3.3<L>   |  31  |  0.6<L>    Ca    8.4<L>      06 Jan 2021 04:30  Phos  2.8     01-06  Mg     2.1     01-06    TPro  5.2<L>  /  Alb  2.9<L>  /  TBili  0.6  /  DBili  x   /  AST  45<H>  /  ALT  27  /  AlkPhos  88  01-06 01-05    145  |  107  |  18  ----------------------------<  93  3.5   |  30  |  0.6<L>    Ca    8.6      05 Jan 2021 03:00  Phos  3.2     01-05  Mg     1.9     01-05    TPro  5.6<L>  /  Alb  3.0<L>  /  TBili  0.5  /  DBili  x   /  AST  70<H>  /  ALT  38  /  AlkPhos  97  01-05 01-04    145  |  110  |  18  ----------------------------<  90  3.5   |  27  |  0.7    Ca    8.5      04 Jan 2021 05:20  Phos  2.8     01-04  Mg     1.9     01-04    TPro  5.2<L>  /  Alb  2.9<L>  /  TBili  0.4  /  DBili  <0.2  /  AST  94<H>  /  ALT  39  /  AlkPhos  93  01-04        ABG - ( 04 Jan 2021 03:31 )  pH, Arterial: 7.36  pH, Blood: x     /  pCO2: 52    /  pO2: 134   / HCO3: 29    / Base Excess: 3.1   /  SaO2: 99                Lactate, Blood: 0.8 mmol/L (01-03-21 @ 20:49)          RADIOLOGY:      CT Head No Cont (12.26.20 @ 06:38) >    IMPRESSION:    No CT evidence of acute territorial infarct or other acute intracranial pathology.       No

## 2021-07-02 ENCOUNTER — APPOINTMENT (OUTPATIENT)
Age: 76
End: 2021-07-02
Payer: MEDICARE

## 2021-07-02 VITALS
WEIGHT: 181 LBS | RESPIRATION RATE: 16 BRPM | DIASTOLIC BLOOD PRESSURE: 76 MMHG | BODY MASS INDEX: 33.31 KG/M2 | HEIGHT: 62 IN | HEART RATE: 62 BPM | SYSTOLIC BLOOD PRESSURE: 114 MMHG

## 2021-07-02 PROCEDURE — 71046 X-RAY EXAM CHEST 2 VIEWS: CPT

## 2021-07-02 PROCEDURE — 99213 OFFICE O/P EST LOW 20 MIN: CPT | Mod: CS,25

## 2021-07-02 RX ORDER — FLUTICASONE PROPIONATE 50 UG/1
50 SPRAY, METERED NASAL DAILY
Refills: 0 | Status: COMPLETED | COMMUNITY
Start: 2019-08-22 | End: 2021-07-02

## 2021-07-02 RX ORDER — FAMOTIDINE 40 MG/1
40 TABLET, FILM COATED ORAL DAILY
Refills: 0 | Status: COMPLETED | COMMUNITY
Start: 2019-08-22 | End: 2021-07-02

## 2021-07-02 NOTE — HISTORY OF PRESENT ILLNESS
[Difficulty Breathing During Exertion] : worsened dyspnea on exertion [Feelings Of Weakness On Exertion] : worsened exercise intolerance [Cough] : denies coughing [Coughing Up Sputum] : denies coughing up sputum [Wheezing] : denies wheezing [Follow-Up - Routine Clinic] : a routine clinic follow-up of [None] : No associated symptoms are reported [CPAP] : CPAP [Good Compliance] : good compliance with treatment [Good Tolerance] : good tolerance of treatment [Good Symptom Control] : good symptom control [FreeTextEntry9] : having issues withSOB when heavy weather changed [de-identified] : does not have O2 adapter for the CPAP unit ! [TextBox_4] : I reviewed all the previous sleep test that are available on file.\par I reviewed my previous office notes.\par

## 2021-07-02 NOTE — ASSESSMENT
[FreeTextEntry1] : A:\par COPD  \par \par Stress compliance with inhalers\par PRN albuterol\par ICS/LABA\par needs PFT\par weight loss\par screening CT chest yearly\par F/U 6 months\par \par \par A:\par FLAVIO\par Obesity\par \par PLAN:\par The patient is benefitting from the CPAP.\par New supplies\par Weight loss\par Stress the need maintain compliance  with the CPAP.\par The patient is not to use an Ozone or UV sterilizer \par needs O2 adapter for CPAP unit\par F/U in 6 months\par \par Pt CPAP may be  under recall, pt will check unit on arriving home\par Discussed with the pt the pros and cons to continuing CPAP .\par The patient is not to use an Ozone or UV sterilizer \par THe pt will call vendor to see next steps in obtaining a new CPAP if unit is recalled.\par I will help in any way possible with transition to new CPAP.\par

## 2021-07-02 NOTE — PHYSICAL EXAM
[No Acute Distress] : no acute distress [Normal Appearance] : normal appearance [Normal Oropharynx] : normal oropharynx [No Neck Mass] : no neck mass [Normal Rate/Rhythm] : normal rate/rhythm [Normal S1, S2] : normal s1, s2 [No Murmurs] : no murmurs [No Resp Distress] : no resp distress [Clear to Auscultation Bilaterally] : clear to auscultation bilaterally [No Abnormalities] : no abnormalities [Benign] : benign [Normal Gait] : normal gait [No Clubbing] : no clubbing [No Cyanosis] : no cyanosis [No Edema] : no edema [FROM] : FROM [Normal Color/ Pigmentation] : normal color/ pigmentation [No Focal Deficits] : no focal deficits [Oriented x3] : oriented x3 [Normal Affect] : normal affect [TextBox_68] : decreased BS

## 2021-07-22 NOTE — ED ADULT NURSE NOTE - NS ED NURSE REPORT GIVEN DT
NIHARIKA VELAZQUEZ   is a   77 yo white female who complains of RUQ pain since 03/2021. She was seen by PCP for this concern that led to initial RUQ u/s and lab work. Her 05/19/2021 labs noted no anemia (hgb 12.2/hct 37.6), , neg h pylori breath test. Her 05/25/2021 RUQ u/s found mild fatty liver, no focal liver lesion, normal GB with no stones, CBD 6 mm, and left kidney cyst. She was previously controlled well on Omeprazole 40 mg qd at time of last visit in 01/2021: No change in diet or medications. Prior EGD in 2018 moderate chronic gastritis bx benign. Rare NSAID use. Denies chest pain, n/v, dysphagia, decrease in appetite, lower abdominal pain, change in BMs, melena, weight loss. BR
29-Sep-2019 17:48

## 2021-07-26 NOTE — PATIENT PROFILE ADULT - HCP AGENT'S NAME
"Requested Prescriptions   Pending Prescriptions Disp Refills     norgestimate-ethinyl estradiol (ORTHO-CYCLEN) 0.25-35 MG-MCG tablet 84 tablet 0     Sig: Take 1 tablet by mouth daily       Contraceptives Protocol Passed - 7/26/2021  9:50 AM        Passed - Patient is not a current smoker if age is 35 or older        Passed - Recent (12 mo) or future (30 days) visit within the authorizing provider's specialty     Patient has had an office visit with the authorizing provider or a provider within the authorizing providers department within the previous 12 mos or has a future within next 30 days. See \"Patient Info\" tab in inbasket, or \"Choose Columns\" in Meds & Orders section of the refill encounter.              Passed - Medication is active on med list        Passed - No active pregnancy on record        Passed - No positive pregnancy test in past 12 months           Last Written Prescription Date:  5/3/21  Last Fill Quantity: 84,  # refills: 0   Last office visit: 10/27/2020 with prescribing provider:  Tiana   Future Office Visit:  none      Prescription approved per [facility] Refill Protocol.    Bertha Woods RN        " Juno Henderson Sr.

## 2021-08-17 NOTE — ED ADULT TRIAGE NOTE - RESPIRATORY RATE (BREATHS/MIN)
Pain reports muscle weakness of right hand improved. Is still doing PT. Sometimes has pain right elbow which then causes pain right shoulder. 20

## 2021-08-27 ENCOUNTER — RX RENEWAL (OUTPATIENT)
Age: 76
End: 2021-08-27

## 2021-09-01 ENCOUNTER — APPOINTMENT (OUTPATIENT)
Age: 76
End: 2021-09-01
Payer: MEDICARE

## 2021-09-01 PROCEDURE — 99442: CPT | Mod: 95

## 2021-09-01 NOTE — ASSESSMENT
[FreeTextEntry1] : A:\par COPD  \par plan:\par Stress compliance with inhalers. Renewed as needed.\par cont PRN albuterol\par cont ICS/LABA\par needs PFT\par weight loss\par f/u CT chest with surgeon\par \par A:\par FLAVIO\par Obesity\par \par PLAN:\par The patient is benefitting from the PAP device .\par New supplies ordered \par Weight loss discussed\par I stressed the need maintain compliance  with the PAP device.\par The patient is not to use an Ozone or UV sterilizer. \par F/U in 6 months\par \par \par \par

## 2021-09-01 NOTE — HISTORY OF PRESENT ILLNESS
[Stable] : stable [Difficulty Breathing During Exertion] : stable dyspnea on exertion [Cough] : denies coughing [Coughing Up Sputum] : denies coughing up sputum [Wheezing] : denies wheezing [Regional Soft Tissue Swelling Both Lower Extremities] : denies lower extremity edema [Adherent] : the patient is adherent with ~his/her~ medication regimen [Home] : at home, [unfilled] , at the time of the visit. [Medical Office: ([facility])___] : at the medical office located in  [Verbal consent obtained from patient] : the patient, [unfilled] [Follow-Up - Routine Clinic] : a routine clinic follow-up of [None] : No associated symptoms are reported [CPAP] : CPAP [Good Compliance] : good compliance with treatment [Good Tolerance] : good tolerance of treatment [Good Symptom Control] : good symptom control [TextBox_4] : F/U with chest surgeon had bx chest no malignancy seen\par \par feeling well otherwise

## 2021-09-16 ENCOUNTER — APPOINTMENT (OUTPATIENT)
Dept: VASCULAR SURGERY | Facility: CLINIC | Age: 76
End: 2021-09-16

## 2021-09-22 NOTE — PATIENT PROFILE ADULT - TOBACCO USE
From: Fran Mckay  To: Braydon Reagan  Sent: 9/22/2021 12:12 PM CDT  Subject: I'm having foot pain.... would like to make appointment with Dr Torres Bryson.     I'm having right foot pain. The pain is beyond the bunion, now into the area adjacent/beneath my toes (maybe referred to as ball). It is a constant pain, and uncomfortable, with some tingling in my toes.     Can you please put in a referral Torres Lamb DPM  Podiatry - Foot Health, Foot Surgery    thank you.   Former smoker

## 2021-09-23 ENCOUNTER — APPOINTMENT (OUTPATIENT)
Dept: CARDIOLOGY | Facility: CLINIC | Age: 76
End: 2021-09-23
Payer: MEDICARE

## 2021-09-23 VITALS
HEIGHT: 62 IN | BODY MASS INDEX: 34.78 KG/M2 | SYSTOLIC BLOOD PRESSURE: 144 MMHG | DIASTOLIC BLOOD PRESSURE: 81 MMHG | OXYGEN SATURATION: 97 % | HEART RATE: 59 BPM | WEIGHT: 189 LBS

## 2021-09-23 PROCEDURE — 99214 OFFICE O/P EST MOD 30 MIN: CPT

## 2021-09-23 PROCEDURE — 93000 ELECTROCARDIOGRAM COMPLETE: CPT

## 2021-09-23 NOTE — PHYSICAL EXAM
[Normal Venous Pressure] : normal venous pressure [Normal Appearance] : was normal in appearance [JVP Elevated ___cm] : the JVP was not elevated [5th Left ICS - MCL] : palpated at the 5th LICS in the midclavicular line [Normal Rate] : normal [Rhythm Regular] : regular [Normal S1] : normal S1 [Normal S2] : normal S2 [S3] : no S3 [I] : a grade 1 [S4] : no S4 [___ +] : bilateral [unfilled]U+ pitting edema to the ankles [2+] : left 2+ [Right Carotid Bruit] : no bruit heard over the right carotid [Left Carotid Bruit] : no bruit heard over the left carotid [No Abnormalities] : the abdominal aorta was not enlarged and no bruit was heard [Normal] : alert and oriented, normal memory

## 2021-09-23 NOTE — DISCUSSION/SUMMARY
[FreeTextEntry1] : \par History htn copd emphysema inga sees now Alexander.  . In hosp 11/18 hypotesion.. Was on verapramil Now off. Use to smoke. LVH EF 55%. Told need 2 gm na diet. Now edema resolved.  Had TIA. On home O2. She 12/20 covid pneumonia . She was on vent. Had neg stress in [place] 10/20. L carotid done by maddie 12/5/20.  She gained 8 pounds. Told resume  weight loss. ? pulmonary rehab.. Memory better.  Told try walk.  Mass neck. Told benign. To not remove per ENT now.

## 2021-09-23 NOTE — HISTORY OF PRESENT ILLNESS
[FreeTextEntry1] : Symptoms:  dyspnea on exertion and fatigue, but no chest pain and no edema. Mass neck. Told benign. To not remove per ENT\par \par \par \par \par \par \par \par

## 2021-09-23 NOTE — REVIEW OF SYSTEMS
[Fever] : no fever [Blurry Vision] : no blurred vision [Hearing Loss] : no hearing loss [Sore Throat] : no sore throat [Chest Discomfort] : no chest discomfort [SOB] : no shortness of breath [Palpitations] : no palpitations [Cough] : no cough [Wheezing] : no wheezing [Abdominal Pain] : no abdominal pain [Constipation] : no constipation [Diarrhea] : diarrhea [Dysuria] : no dysuria [Joint Pain] : joint pain [Rash] : no rash [Skin Lesions] : no skin lesions [Dizziness] : no dizziness [Negative] : Heme/Lymph [FreeTextEntry9] : c/o mild left hand and right hip pain

## 2021-10-05 ENCOUNTER — NON-APPOINTMENT (OUTPATIENT)
Age: 76
End: 2021-10-05

## 2021-10-21 ENCOUNTER — INPATIENT (INPATIENT)
Facility: HOSPITAL | Age: 76
LOS: 6 days | Discharge: HOME | End: 2021-10-28
Attending: STUDENT IN AN ORGANIZED HEALTH CARE EDUCATION/TRAINING PROGRAM | Admitting: STUDENT IN AN ORGANIZED HEALTH CARE EDUCATION/TRAINING PROGRAM
Payer: MEDICARE

## 2021-10-21 VITALS
OXYGEN SATURATION: 98 % | HEIGHT: 63 IN | HEART RATE: 111 BPM | WEIGHT: 190.04 LBS | SYSTOLIC BLOOD PRESSURE: 117 MMHG | TEMPERATURE: 101 F | RESPIRATION RATE: 30 BRPM | DIASTOLIC BLOOD PRESSURE: 61 MMHG

## 2021-10-21 DIAGNOSIS — I48.0 PAROXYSMAL ATRIAL FIBRILLATION: ICD-10-CM

## 2021-10-21 DIAGNOSIS — J44.1 CHRONIC OBSTRUCTIVE PULMONARY DISEASE WITH (ACUTE) EXACERBATION: ICD-10-CM

## 2021-10-21 DIAGNOSIS — Z98.62 PERIPHERAL VASCULAR ANGIOPLASTY STATUS: Chronic | ICD-10-CM

## 2021-10-21 DIAGNOSIS — R09.89 OTHER SPECIFIED SYMPTOMS AND SIGNS INVOLVING THE CIRCULATORY AND RESPIRATORY SYSTEMS: ICD-10-CM

## 2021-10-21 DIAGNOSIS — A41.9 SEPSIS, UNSPECIFIED ORGANISM: ICD-10-CM

## 2021-10-21 DIAGNOSIS — Z90.49 ACQUIRED ABSENCE OF OTHER SPECIFIED PARTS OF DIGESTIVE TRACT: Chronic | ICD-10-CM

## 2021-10-21 LAB
ALBUMIN SERPL ELPH-MCNC: 3.6 G/DL — SIGNIFICANT CHANGE UP (ref 3.5–5.2)
ALP SERPL-CCNC: 113 U/L — SIGNIFICANT CHANGE UP (ref 30–115)
ALT FLD-CCNC: 8 U/L — SIGNIFICANT CHANGE UP (ref 0–41)
ANION GAP SERPL CALC-SCNC: 13 MMOL/L — SIGNIFICANT CHANGE UP (ref 7–14)
APPEARANCE UR: ABNORMAL
APTT BLD: 37 SEC — SIGNIFICANT CHANGE UP (ref 27–39.2)
AST SERPL-CCNC: 19 U/L — SIGNIFICANT CHANGE UP (ref 0–41)
BACTERIA # UR AUTO: ABNORMAL
BASE EXCESS BLDA CALC-SCNC: -5.1 MMOL/L — LOW (ref -2–3)
BASOPHILS # BLD AUTO: 0.03 K/UL — SIGNIFICANT CHANGE UP (ref 0–0.2)
BASOPHILS NFR BLD AUTO: 0.2 % — SIGNIFICANT CHANGE UP (ref 0–1)
BILIRUB SERPL-MCNC: 0.6 MG/DL — SIGNIFICANT CHANGE UP (ref 0.2–1.2)
BILIRUB UR-MCNC: NEGATIVE — SIGNIFICANT CHANGE UP
BUN SERPL-MCNC: 17 MG/DL — SIGNIFICANT CHANGE UP (ref 10–20)
CALCIUM SERPL-MCNC: 8.8 MG/DL — SIGNIFICANT CHANGE UP (ref 8.5–10.1)
CHLORIDE SERPL-SCNC: 100 MMOL/L — SIGNIFICANT CHANGE UP (ref 98–110)
CO2 SERPL-SCNC: 23 MMOL/L — SIGNIFICANT CHANGE UP (ref 17–32)
COLOR SPEC: YELLOW — SIGNIFICANT CHANGE UP
CREAT SERPL-MCNC: 1.2 MG/DL — SIGNIFICANT CHANGE UP (ref 0.7–1.5)
D DIMER BLD IA.RAPID-MCNC: 856 NG/ML DDU — HIGH (ref 0–230)
DIFF PNL FLD: ABNORMAL
EOSINOPHIL # BLD AUTO: 0.01 K/UL — SIGNIFICANT CHANGE UP (ref 0–0.7)
EOSINOPHIL NFR BLD AUTO: 0.1 % — SIGNIFICANT CHANGE UP (ref 0–8)
EPI CELLS # UR: ABNORMAL /HPF
GLUCOSE SERPL-MCNC: 113 MG/DL — HIGH (ref 70–99)
GLUCOSE UR QL: NEGATIVE MG/DL — SIGNIFICANT CHANGE UP
HCO3 BLDA-SCNC: 20 MMOL/L — LOW (ref 21–28)
HCT VFR BLD CALC: 37.3 % — SIGNIFICANT CHANGE UP (ref 37–47)
HGB BLD-MCNC: 12.2 G/DL — SIGNIFICANT CHANGE UP (ref 12–16)
IMM GRANULOCYTES NFR BLD AUTO: 0.7 % — HIGH (ref 0.1–0.3)
INR BLD: 1.38 RATIO — HIGH (ref 0.65–1.3)
KETONES UR-MCNC: NEGATIVE — SIGNIFICANT CHANGE UP
LACTATE SERPL-SCNC: 1.3 MMOL/L — SIGNIFICANT CHANGE UP (ref 0.7–2)
LEUKOCYTE ESTERASE UR-ACNC: ABNORMAL
LYMPHOCYTES # BLD AUTO: 1.59 K/UL — SIGNIFICANT CHANGE UP (ref 1.2–3.4)
LYMPHOCYTES # BLD AUTO: 11.6 % — LOW (ref 20.5–51.1)
MCHC RBC-ENTMCNC: 30.7 PG — SIGNIFICANT CHANGE UP (ref 27–31)
MCHC RBC-ENTMCNC: 32.7 G/DL — SIGNIFICANT CHANGE UP (ref 32–37)
MCV RBC AUTO: 94 FL — SIGNIFICANT CHANGE UP (ref 81–99)
MONOCYTES # BLD AUTO: 1.19 K/UL — HIGH (ref 0.1–0.6)
MONOCYTES NFR BLD AUTO: 8.7 % — SIGNIFICANT CHANGE UP (ref 1.7–9.3)
NEUTROPHILS # BLD AUTO: 10.82 K/UL — HIGH (ref 1.4–6.5)
NEUTROPHILS NFR BLD AUTO: 78.7 % — HIGH (ref 42.2–75.2)
NITRITE UR-MCNC: POSITIVE
NRBC # BLD: 0 /100 WBCS — SIGNIFICANT CHANGE UP (ref 0–0)
NT-PROBNP SERPL-SCNC: 5716 PG/ML — HIGH (ref 0–300)
PCO2 BLDA: 36 MMHG — HIGH (ref 32–35)
PH BLDA: 7.35 — SIGNIFICANT CHANGE UP (ref 7.35–7.45)
PH UR: 6 — SIGNIFICANT CHANGE UP (ref 5–8)
PLATELET # BLD AUTO: 195 K/UL — SIGNIFICANT CHANGE UP (ref 130–400)
PO2 BLDA: 142 MMHG — HIGH (ref 83–108)
POTASSIUM SERPL-MCNC: 3.2 MMOL/L — LOW (ref 3.5–5)
POTASSIUM SERPL-SCNC: 3.2 MMOL/L — LOW (ref 3.5–5)
PROT SERPL-MCNC: 6 G/DL — SIGNIFICANT CHANGE UP (ref 6–8)
PROT UR-MCNC: 30 MG/DL
PROTHROM AB SERPL-ACNC: 15.8 SEC — HIGH (ref 9.95–12.87)
RBC # BLD: 3.97 M/UL — LOW (ref 4.2–5.4)
RBC # FLD: 13.9 % — SIGNIFICANT CHANGE UP (ref 11.5–14.5)
RBC CASTS # UR COMP ASSIST: ABNORMAL /HPF
SAO2 % BLDA: 100 % — HIGH (ref 94–98)
SARS-COV-2 RNA SPEC QL NAA+PROBE: SIGNIFICANT CHANGE UP
SODIUM SERPL-SCNC: 136 MMOL/L — SIGNIFICANT CHANGE UP (ref 135–146)
SP GR SPEC: 1.02 — SIGNIFICANT CHANGE UP (ref 1.01–1.03)
TROPONIN T SERPL-MCNC: <0.01 NG/ML — SIGNIFICANT CHANGE UP
UROBILINOGEN FLD QL: 0.2 MG/DL — SIGNIFICANT CHANGE UP
WBC # BLD: 13.73 K/UL — HIGH (ref 4.8–10.8)
WBC # FLD AUTO: 13.73 K/UL — HIGH (ref 4.8–10.8)
WBC UR QL: >50 /HPF

## 2021-10-21 PROCEDURE — 93010 ELECTROCARDIOGRAM REPORT: CPT

## 2021-10-21 PROCEDURE — 71045 X-RAY EXAM CHEST 1 VIEW: CPT | Mod: 26,77

## 2021-10-21 PROCEDURE — 99291 CRITICAL CARE FIRST HOUR: CPT | Mod: CS

## 2021-10-21 PROCEDURE — 93970 EXTREMITY STUDY: CPT | Mod: 26

## 2021-10-21 PROCEDURE — 71045 X-RAY EXAM CHEST 1 VIEW: CPT | Mod: 26

## 2021-10-21 PROCEDURE — 99291 CRITICAL CARE FIRST HOUR: CPT

## 2021-10-21 PROCEDURE — 99292 CRITICAL CARE ADDL 30 MIN: CPT | Mod: CS

## 2021-10-21 RX ORDER — ENOXAPARIN SODIUM 100 MG/ML
120 INJECTION SUBCUTANEOUS ONCE
Refills: 0 | Status: COMPLETED | OUTPATIENT
Start: 2021-10-21 | End: 2021-10-21

## 2021-10-21 RX ORDER — SODIUM CHLORIDE 9 MG/ML
250 INJECTION INTRAMUSCULAR; INTRAVENOUS; SUBCUTANEOUS ONCE
Refills: 0 | Status: COMPLETED | OUTPATIENT
Start: 2021-10-21 | End: 2021-10-21

## 2021-10-21 RX ORDER — TIOTROPIUM BROMIDE 18 UG/1
1 CAPSULE ORAL; RESPIRATORY (INHALATION) ONCE
Refills: 0 | Status: COMPLETED | OUTPATIENT
Start: 2021-10-21 | End: 2021-10-22

## 2021-10-21 RX ORDER — MORPHINE SULFATE 50 MG/1
2 CAPSULE, EXTENDED RELEASE ORAL ONCE
Refills: 0 | Status: DISCONTINUED | OUTPATIENT
Start: 2021-10-21 | End: 2021-10-21

## 2021-10-21 RX ORDER — AMIODARONE HYDROCHLORIDE 400 MG/1
150 TABLET ORAL ONCE
Refills: 0 | Status: COMPLETED | OUTPATIENT
Start: 2021-10-21 | End: 2021-10-21

## 2021-10-21 RX ORDER — CLOPIDOGREL BISULFATE 75 MG/1
75 TABLET, FILM COATED ORAL DAILY
Refills: 0 | Status: DISCONTINUED | OUTPATIENT
Start: 2021-10-21 | End: 2021-10-26

## 2021-10-21 RX ORDER — SODIUM CHLORIDE 9 MG/ML
500 INJECTION INTRAMUSCULAR; INTRAVENOUS; SUBCUTANEOUS ONCE
Refills: 0 | Status: COMPLETED | OUTPATIENT
Start: 2021-10-21 | End: 2021-10-21

## 2021-10-21 RX ORDER — AMLODIPINE BESYLATE 2.5 MG/1
5 TABLET ORAL ONCE
Refills: 0 | Status: COMPLETED | OUTPATIENT
Start: 2021-10-21 | End: 2021-10-21

## 2021-10-21 RX ORDER — ENOXAPARIN SODIUM 100 MG/ML
80 INJECTION SUBCUTANEOUS EVERY 12 HOURS
Refills: 0 | Status: DISCONTINUED | OUTPATIENT
Start: 2021-10-21 | End: 2021-10-22

## 2021-10-21 RX ORDER — PANTOPRAZOLE SODIUM 20 MG/1
1 TABLET, DELAYED RELEASE ORAL
Qty: 0 | Refills: 0 | DISCHARGE

## 2021-10-21 RX ORDER — FAMOTIDINE 10 MG/ML
1 INJECTION INTRAVENOUS
Qty: 0 | Refills: 0 | DISCHARGE

## 2021-10-21 RX ORDER — LAMOTRIGINE 25 MG/1
100 TABLET, ORALLY DISINTEGRATING ORAL ONCE
Refills: 0 | Status: COMPLETED | OUTPATIENT
Start: 2021-10-21 | End: 2021-10-22

## 2021-10-21 RX ORDER — DILTIAZEM HCL 120 MG
10 CAPSULE, EXT RELEASE 24 HR ORAL ONCE
Refills: 0 | Status: COMPLETED | OUTPATIENT
Start: 2021-10-21 | End: 2021-10-21

## 2021-10-21 RX ORDER — AZITHROMYCIN 500 MG/1
500 TABLET, FILM COATED ORAL EVERY 24 HOURS
Refills: 0 | Status: DISCONTINUED | OUTPATIENT
Start: 2021-10-22 | End: 2021-10-28

## 2021-10-21 RX ORDER — MAGNESIUM SULFATE 500 MG/ML
2 VIAL (ML) INJECTION ONCE
Refills: 0 | Status: COMPLETED | OUTPATIENT
Start: 2021-10-21 | End: 2021-10-21

## 2021-10-21 RX ORDER — GABAPENTIN 400 MG/1
300 CAPSULE ORAL ONCE
Refills: 0 | Status: COMPLETED | OUTPATIENT
Start: 2021-10-21 | End: 2021-10-22

## 2021-10-21 RX ORDER — QUETIAPINE FUMARATE 200 MG/1
200 TABLET, FILM COATED ORAL ONCE
Refills: 0 | Status: COMPLETED | OUTPATIENT
Start: 2021-10-21 | End: 2021-10-22

## 2021-10-21 RX ORDER — NOREPINEPHRINE BITARTRATE/D5W 8 MG/250ML
0.05 PLASTIC BAG, INJECTION (ML) INTRAVENOUS
Qty: 8 | Refills: 0 | Status: DISCONTINUED | OUTPATIENT
Start: 2021-10-21 | End: 2021-10-25

## 2021-10-21 RX ORDER — CHLORHEXIDINE GLUCONATE 213 G/1000ML
1 SOLUTION TOPICAL
Refills: 0 | Status: DISCONTINUED | OUTPATIENT
Start: 2021-10-21 | End: 2021-10-28

## 2021-10-21 RX ORDER — DILTIAZEM HCL 120 MG
5 CAPSULE, EXT RELEASE 24 HR ORAL
Qty: 125 | Refills: 0 | Status: DISCONTINUED | OUTPATIENT
Start: 2021-10-21 | End: 2021-10-21

## 2021-10-21 RX ORDER — CEFTRIAXONE 500 MG/1
1000 INJECTION, POWDER, FOR SOLUTION INTRAMUSCULAR; INTRAVENOUS EVERY 24 HOURS
Refills: 0 | Status: DISCONTINUED | OUTPATIENT
Start: 2021-10-22 | End: 2021-10-28

## 2021-10-21 RX ORDER — METOPROLOL TARTRATE 50 MG
100 TABLET ORAL DAILY
Refills: 0 | Status: DISCONTINUED | OUTPATIENT
Start: 2021-10-21 | End: 2021-10-22

## 2021-10-21 RX ORDER — CEFEPIME 1 G/1
1000 INJECTION, POWDER, FOR SOLUTION INTRAMUSCULAR; INTRAVENOUS ONCE
Refills: 0 | Status: COMPLETED | OUTPATIENT
Start: 2021-10-21 | End: 2021-10-21

## 2021-10-21 RX ORDER — IPRATROPIUM/ALBUTEROL SULFATE 18-103MCG
3 AEROSOL WITH ADAPTER (GRAM) INHALATION EVERY 6 HOURS
Refills: 0 | Status: DISCONTINUED | OUTPATIENT
Start: 2021-10-21 | End: 2021-10-28

## 2021-10-21 RX ORDER — AZITHROMYCIN 500 MG/1
500 TABLET, FILM COATED ORAL ONCE
Refills: 0 | Status: COMPLETED | OUTPATIENT
Start: 2021-10-21 | End: 2021-10-21

## 2021-10-21 RX ORDER — SODIUM CHLORIDE 9 MG/ML
1000 INJECTION INTRAMUSCULAR; INTRAVENOUS; SUBCUTANEOUS ONCE
Refills: 0 | Status: COMPLETED | OUTPATIENT
Start: 2021-10-21 | End: 2021-10-21

## 2021-10-21 RX ORDER — CEFTRIAXONE 500 MG/1
INJECTION, POWDER, FOR SOLUTION INTRAMUSCULAR; INTRAVENOUS
Refills: 0 | Status: DISCONTINUED | OUTPATIENT
Start: 2021-10-21 | End: 2021-10-28

## 2021-10-21 RX ORDER — ACETAMINOPHEN 500 MG
975 TABLET ORAL ONCE
Refills: 0 | Status: COMPLETED | OUTPATIENT
Start: 2021-10-21 | End: 2021-10-21

## 2021-10-21 RX ORDER — AZITHROMYCIN 500 MG/1
TABLET, FILM COATED ORAL
Refills: 0 | Status: DISCONTINUED | OUTPATIENT
Start: 2021-10-21 | End: 2021-10-28

## 2021-10-21 RX ORDER — ACETAMINOPHEN 500 MG
650 TABLET ORAL EVERY 8 HOURS
Refills: 0 | Status: DISCONTINUED | OUTPATIENT
Start: 2021-10-21 | End: 2021-10-28

## 2021-10-21 RX ORDER — METOPROLOL TARTRATE 50 MG
5 TABLET ORAL ONCE
Refills: 0 | Status: COMPLETED | OUTPATIENT
Start: 2021-10-21 | End: 2021-10-21

## 2021-10-21 RX ORDER — PANTOPRAZOLE SODIUM 20 MG/1
40 TABLET, DELAYED RELEASE ORAL DAILY
Refills: 0 | Status: DISCONTINUED | OUTPATIENT
Start: 2021-10-21 | End: 2021-10-26

## 2021-10-21 RX ORDER — CEFTRIAXONE 500 MG/1
1000 INJECTION, POWDER, FOR SOLUTION INTRAMUSCULAR; INTRAVENOUS ONCE
Refills: 0 | Status: COMPLETED | OUTPATIENT
Start: 2021-10-21 | End: 2021-10-21

## 2021-10-21 RX ORDER — ASPIRIN/CALCIUM CARB/MAGNESIUM 324 MG
81 TABLET ORAL DAILY
Refills: 0 | Status: DISCONTINUED | OUTPATIENT
Start: 2021-10-21 | End: 2021-10-28

## 2021-10-21 RX ORDER — CLOPIDOGREL BISULFATE 75 MG/1
1 TABLET, FILM COATED ORAL
Qty: 0 | Refills: 0 | DISCHARGE

## 2021-10-21 RX ORDER — PANTOPRAZOLE SODIUM 20 MG/1
40 TABLET, DELAYED RELEASE ORAL
Refills: 0 | Status: DISCONTINUED | OUTPATIENT
Start: 2021-10-21 | End: 2021-10-21

## 2021-10-21 RX ORDER — ATORVASTATIN CALCIUM 80 MG/1
20 TABLET, FILM COATED ORAL AT BEDTIME
Refills: 0 | Status: DISCONTINUED | OUTPATIENT
Start: 2021-10-21 | End: 2021-10-28

## 2021-10-21 RX ADMIN — Medication 5 MG/HR: at 19:15

## 2021-10-21 RX ADMIN — SODIUM CHLORIDE 8.33 MILLILITER(S): 9 INJECTION INTRAMUSCULAR; INTRAVENOUS; SUBCUTANEOUS at 16:36

## 2021-10-21 RX ADMIN — MORPHINE SULFATE 2 MILLIGRAM(S): 50 CAPSULE, EXTENDED RELEASE ORAL at 19:15

## 2021-10-21 RX ADMIN — Medication 975 MILLIGRAM(S): at 14:19

## 2021-10-21 RX ADMIN — SODIUM CHLORIDE 1000 MILLILITER(S): 9 INJECTION INTRAMUSCULAR; INTRAVENOUS; SUBCUTANEOUS at 15:18

## 2021-10-21 RX ADMIN — CEFEPIME 100 MILLIGRAM(S): 1 INJECTION, POWDER, FOR SOLUTION INTRAMUSCULAR; INTRAVENOUS at 15:17

## 2021-10-21 RX ADMIN — Medication 5 MILLIGRAM(S): at 21:02

## 2021-10-21 RX ADMIN — MORPHINE SULFATE 2 MILLIGRAM(S): 50 CAPSULE, EXTENDED RELEASE ORAL at 18:45

## 2021-10-21 RX ADMIN — ATORVASTATIN CALCIUM 20 MILLIGRAM(S): 80 TABLET, FILM COATED ORAL at 22:50

## 2021-10-21 RX ADMIN — AMIODARONE HYDROCHLORIDE 600 MILLIGRAM(S): 400 TABLET ORAL at 20:30

## 2021-10-21 RX ADMIN — Medication 10 MILLIGRAM(S): at 18:30

## 2021-10-21 RX ADMIN — SODIUM CHLORIDE 1000 MILLILITER(S): 9 INJECTION INTRAMUSCULAR; INTRAVENOUS; SUBCUTANEOUS at 19:40

## 2021-10-21 RX ADMIN — ENOXAPARIN SODIUM 120 MILLIGRAM(S): 100 INJECTION SUBCUTANEOUS at 19:39

## 2021-10-21 RX ADMIN — Medication 81 MILLIGRAM(S): at 22:50

## 2021-10-21 RX ADMIN — Medication 10 MILLIGRAM(S): at 18:46

## 2021-10-21 RX ADMIN — Medication 50 GRAM(S): at 21:06

## 2021-10-21 NOTE — ED PROVIDER NOTE - PROGRESS NOTE DETAILS
patient with anaphylaxis to dye, will likely anticoagulate and defer imaging to inpatient team; jose Henderson [phone] endorsed to Dr Ramos bedside echo preformed due to chest pain and SOB. patient with hyperdynamic , no effusion, good ef, no right sided strain. patient c/o sharp chest pain with SOB , EKG rapid a.fib at 144- 160 BPM. patient given cardizem x 10mg IV push x 2 without any change in rate. will start IV drip patient with persistent elevated heart rate and blood pressure 110 systolic. patient given amiodarone 150 mg iv push and ICU consulted Tracey lazo at bedside requesting ABG, lopressor 5 mg ivp . patient with resolution of tachycardia, now NSR . patient feeling much improved.

## 2021-10-21 NOTE — ED PROVIDER NOTE - ATTENDING CONTRIBUTION TO CARE
76y female above PMH with sob and leg pain, also with abd pain, initially denies CP but subsequently c/o this in ED, on exam vital signs appreciated, chronically ill but nontoxic appearing, comfortable on bipap, head nc/at, perrla, conj pink neck supple cor tachy, reg with extrasystoles lungs with scattered rhonchi/wheeze abd +bs, soft with suprapubic and llq ttp no g/r, no c/c/e, +left calf pain, will check labs, urine, imaging

## 2021-10-21 NOTE — H&P ADULT - PROBLEM SELECTOR PLAN 5
Continue Lovenox  When off BiPaP will consider VQ Scan  Echocardiogram to evaluate Rt heart strain  CT Chest Contraindicated due to anaphylactic response to contrast

## 2021-10-21 NOTE — ED PROVIDER NOTE - NS ED ROS FT
Constitutional: (+) fever, (+) weakness  Eyes/ENT: (-) blurry vision, (-) epistaxis  Cardiovascular: (-) chest pain, (-) syncope  Respiratory: (-) cough, (+) shortness of breath  Gastrointestinal: (-) vomiting, (-) diarrhea  Musculoskeletal: (-) neck pain, (-) back pain, (-) joint pain, (+) calf pain  Integumentary: (-) rash, (-) edema  Neurological: (-) headache, (-) altered mental status  Psychiatric: (-) hallucinations  Allergic/Immunologic: (-) pruritus

## 2021-10-21 NOTE — H&P ADULT - PROBLEM SELECTOR PLAN 3
Continue BiPaP  Oxygen Sat > 90  Awaiting ABG  Albuterol /Atrovent Neb q6  Will start Steroids once ABG complete  Pulmonary Consult Dr Vickers Continue BiPaP  Oxygen Sat > 90  Awaiting ABG  Albuterol /Atrovent Neb q6  Will start Steroids once ABG complete  Will continue home meds when stable off bipap  Pulmonary Consult Dr Vickers

## 2021-10-21 NOTE — H&P ADULT - ATTENDING COMMENTS
Pt seen w PA and agree w above.  Pt w likely COPD exacerbation and sob, Will treat w bronchodilators and abx.  Pt has elevated d-dimer w tenderness to the l calf, awaiting doppler.  Will presumptively start on AC.  Will obtain possible v:q to eval for PE.  Obtain 2d echo. Pt has a-fib rvr that responded to lopressor. HR now controlled. Admit to ICU. Cardio eval.

## 2021-10-21 NOTE — ED PROVIDER NOTE - OBJECTIVE STATEMENT
75 y/o PMH COPD on home o2, A-Fib on aspirin and Plavix, CHF, Bi-polar disorder and Covid-19 in January, subsequently vaccinated presents to the ED with L calf pain over the past few days associated with SOB and fever. Pt has no hemoptysis, or CP but is c/o weakness. Pt is not in any respiratory distress. 77 y/o PMH COPD on home o2, A-Fib on aspirin and Plavix, CHF, Bi-polar disorder and Covid-19 in January, subsequently vaccinated presents to the ED with L calf pain over the past few days associated with SOB and fever. Pt has no hemoptysis, or CP but is c/o weakness. patient denies any productive cough or congestion. patient with hx of FLAVIO non compliant with her c-pap at night as per her . patient has been c/o left calf burning pain without any swelling or redness. no streaking up leg. patient c/o chills and dysuria. no back pain. patient c/o abdominal cramping radiating to chest . no palpitations, arm weakness.

## 2021-10-21 NOTE — H&P ADULT - PROBLEM SELECTOR PLAN 1
Admit Critical Care bed  Start IV Rocephin / IV Azithromycin  ID consult  Follow Blood Culture, UA, Urine Cx  Repeats CBC, BMP, Mg,  Procalcitonin, Phos, Admit Critical Care bed  Start IV Rocephin / IV Azithromycin  ID consult  Follow Blood Culture, UA, Urine Cx  Repeats CBC, BMP, Mg,  Procalcitonin, Phos  ,

## 2021-10-21 NOTE — ED ADULT NURSE REASSESSMENT NOTE - NS ED NURSE REASSESS COMMENT FT1
Attempted to try pt on NRB as per CLEMENCIA Everett pt c/o chest pressure and burning heart rate 140 - 160.  EKG [id number] Dr Ramos and CLEMENCIA Everett at bedside.  Meds as noted in worklist.

## 2021-10-21 NOTE — ED PROVIDER NOTE - CARE PLAN
1 Principal Discharge DX:	Sepsis  Secondary Diagnosis:	Acute UTI  Secondary Diagnosis:	Paroxysmal atrial fibrillation with RVR  Secondary Diagnosis:	SOB (shortness of breath)

## 2021-10-21 NOTE — H&P ADULT - ASSESSMENT
77 y/o PMHx COPD on home o2, A-Fib, CVA hx on aspirin and Plavix, Emphysema, HTN, s/p Carotid artheroplasty and Covid-19 in January, subsequently vaccinated.  Pt presented to the ED today with SOB and fever Tmax 102 x 4 days and one day c/o Left calf pain.   Sepsis / Acute UTI / Afib w/ RVR

## 2021-10-21 NOTE — ED PROVIDER NOTE - CLINICAL SUMMARY MEDICAL DECISION MAKING FREE TEXT BOX
patient presents for evaluation of increasing sob and fever, given iv antibiotics for uti per ua, in addition complains of lower leg pain with sob however, patient has anaphylactic reaction to ct dye unable to ct given her clincial presentation given iv lovenox with elevation of ddimer. initially patient has nsr however converted to afib with rvr, she was given multiple doses of cardizem, amiodarone (loading dose) and metoprolol as well as iv fluid she improved returning back to nsr.  we have consulted ICu and will admit the patient for further management at this time

## 2021-10-21 NOTE — H&P ADULT - NSHPPHYSICALEXAM_GEN_ALL_CORE
GENERAL:  75y/o Female in mild respiratory distress on BiPaP  HEAD:  Atraumatic, Normocephalic  EYES: EOMI, PERRLA, conjunctiva and sclera clear  NECK: Supple, No JVD, no cervical lymphadenopathy, non-tender  CHEST/LUNG: + air entry b/l  HEART: Irregular rhythm  ABDOMEN: Soft, Nontender, Nondistended x 4 quadrants; Bowel sounds present  EXTREMITIES:   Peripheral Pulses Present, No clubbing, no cyanosis, or no edema, +++LEFT   PSYCH: AAOx3, cooperative, appropriate  NEUROLOGY: WNL  SKIN: WNL

## 2021-10-21 NOTE — ED ADULT NURSE NOTE - NSICDXPASTMEDICALHX_GEN_ALL_CORE_FT
PAST MEDICAL HISTORY:  2019 novel coronavirus disease (COVID-19)     Allergic reaction     Bipolar 1 disorder     Congestive heart failure     COPD (chronic obstructive pulmonary disease)     Depression     Falls     Hypertension     FLAVIO on CPAP     Other cardiomyopathy     Other emphysema     PVD (peripheral vascular disease)     Respiratory failure requiring intubation     TIA (transient ischemic attack)     Transient ischemic attack

## 2021-10-21 NOTE — H&P ADULT - HISTORY OF PRESENT ILLNESS
77 y/o PMHx COPD on home o2, A-Fib, CVA hx on aspirin and Plavix, Emphysema, HTN, s/p Carotid artheroplasty and Covid-19 in January, subsequently vaccinated.  Pt presented to the ED today with SOB and fever Tmax 102 x 4 days and one day c/o Left calf pain.   Pt denies hemoptysis or cough, but also complains of mild chest discomfort.  At present time patient is on BiPaP, s/p Cardizem push 20mg and IV infusion, and Amidarone Bolus still in afib rate uncontrolled.  Pt was given Lopressor 5mg IVP at bedside and rate now controlled in 70's.   Pt admitted to relief of chest discomfort.  Pulmonologist : Dr Vickers  Cardiologist:  Dr Bishop

## 2021-10-21 NOTE — H&P ADULT - NSHPLABSRESULTS_GEN_ALL_CORE
12.2   13.73 )-----------( 195      ( 21 Oct 2021 13:56 )             37.3       10    136  |  100  |  17  ----------------------------<  113<H>  3.2<L>   |  23  |  1.2    Ca    8.8      21 Oct 2021 13:56    TPro  6.0  /  Alb  3.6  /  TBili  0.6  /  DBili  x   /  AST  19  /  ALT  8   /  AlkPhos  113  10-          ABG - ( 21 Oct 2021 21:32 )  pH, Arterial: 7.35  pH, Blood: x     /  pCO2: 36    /  pO2: 142   / HCO3: 20    / Base Excess: -5.1  /  SaO2: 100.0               Urinalysis Basic - ( 21 Oct 2021 14:44 )    Color: Yellow / Appearance: Slightly Cloudy / S.020 / pH: x  Gluc: x / Ketone: Negative  / Bili: Negative / Urobili: 0.2 mg/dL   Blood: x / Protein: 30 mg/dL / Nitrite: Positive   Leuk Esterase: Large / RBC: 3-5 /HPF / WBC >50 /HPF   Sq Epi: x / Non Sq Epi: Few /HPF / Bacteria: Moderate        PT/INR - ( 21 Oct 2021 15:10 )   PT: 15.80 sec;   INR: 1.38 ratio         PTT - ( 21 Oct 2021 15:10 )  PTT:37.0 sec    Lactate Trend  10-21 @ 13:56 Lactate:1.3       CARDIAC MARKERS ( 21 Oct 2021 13:56 )  x     / <0.01 ng/mL / x     / x     / x            CAPILLARY BLOOD GLUCOSE        < from: Xray Chest 1 View- PORTABLE-Urgent (10.21.21 @ 15:07) >    Impression:    Bilateralperihilar opacities, left greater than right        --- End of Report ---      KARMEN MURPHY MD; Attending Radiologist  This document has been electronically signed. Oct 21 2021  4:17PM    < end of copied text >

## 2021-10-21 NOTE — H&P ADULT - NSHPSOCIALHISTORY_GEN_ALL_CORE
Tobacco use: quit 25 yrs ago, 2pk per day  EtOH use: denies  Illicit drug use: denies  Marital Status: lives at home with

## 2021-10-21 NOTE — ED PROVIDER NOTE - PHYSICAL EXAMINATION
Physical Exam    Vital Signs: I have reviewed the initial vital signs.  Constitutional: well-nourished, appears stated age, no acute distress  Eyes: Conjunctiva pink, Sclera clear, PERRLA, EOMI.  Cardiovascular: S1 and S2, (+)murmur, (+)tachycardia, regular rhythm, well-perfused extremities, radial pulses equal and 2+  Respiratory: unlabored respiratory effort, (+)bilateral wheezing, no rales and rhonchi  Gastrointestinal: soft, non-tender abdomen, no pulsatile mass, normal bowl sounds  Musculoskeletal: no calf tenderness, no LE edema, cap refill < 2s  Integumentary: warm, dry, no rash  Neurologic: awake, alert.  Psychiatric: appropriate mood, appropriate affect

## 2021-10-21 NOTE — H&P ADULT - NSHPREVIEWOFSYSTEMS_GEN_ALL_CORE
REVIEW OF SYSTEMS:    CONSTITUTIONAL: + Weakness + fever + chills - see HPI  EYES/ENT: No visual changes;  No vertigo or throat pain   NECK: No pain or stiffness  RESPIRATORY: see HPI  CARDIOVASCULAR: see HPI  GASTROINTESTINAL: No abdominal or epigastric pain. No nausea, vomiting, or hematemesis; No diarrhea or constipation. No melena or hematochezia.  GENITOURINARY: No dysuria, frequency or hematuria  NEUROLOGICAL: No numbness or weakness  SKIN: No itching, rashes

## 2021-10-21 NOTE — H&P ADULT - PROBLEM SELECTOR PLAN 2
S/P Cardizem, Amiodarone and Lopressor 5 IVP   Conversion in ED  Telemetry Monitoring  Continue Home Dose Lopressor   Hold Cardizem drip at present  Cardiology Consult- Dr Bishop

## 2021-10-21 NOTE — ED PROVIDER NOTE - NSICDXPASTMEDICALHX_GEN_ALL_CORE_FT
PAST MEDICAL HISTORY:  2019 novel coronavirus disease (COVID-19)     Afib     Allergic reaction     Bipolar 1 disorder     Congestive heart failure     COPD (chronic obstructive pulmonary disease)     Depression     Falls     Hypertension     FLAVIO on CPAP     Other cardiomyopathy     Other emphysema     PVD (peripheral vascular disease)     Respiratory failure requiring intubation     Transient ischemic attack

## 2021-10-21 NOTE — ED ADULT NURSE NOTE - NSIMPLEMENTINTERV_GEN_ALL_ED
Implemented All Universal Safety Interventions:  [place] to call system. Call bell, personal items and telephone within reach. Instruct patient to call for assistance. Room bathroom lighting operational. Non-slip footwear when patient is off stretcher. Physically safe environment: no spills, clutter or unnecessary equipment. Stretcher in lowest position, wheels locked, appropriate side rails in place.

## 2021-10-21 NOTE — ED ADULT TRIAGE NOTE - CHIEF COMPLAINT QUOTE
BIBA via [facility] from home, complaining of shortness of breath that began Yesterday and worsened today, associated with fever of 101.

## 2021-10-22 DIAGNOSIS — J18.9 PNEUMONIA, UNSPECIFIED ORGANISM: ICD-10-CM

## 2021-10-22 DIAGNOSIS — I50.9 HEART FAILURE, UNSPECIFIED: ICD-10-CM

## 2021-10-22 DIAGNOSIS — I21.4 NON-ST ELEVATION (NSTEMI) MYOCARDIAL INFARCTION: ICD-10-CM

## 2021-10-22 LAB
ANION GAP SERPL CALC-SCNC: 12 MMOL/L — SIGNIFICANT CHANGE UP (ref 7–14)
ANION GAP SERPL CALC-SCNC: 15 MMOL/L — HIGH (ref 7–14)
APTT BLD: 81.2 SEC — CRITICAL HIGH (ref 27–39.2)
BASOPHILS # BLD AUTO: 0.04 K/UL — SIGNIFICANT CHANGE UP (ref 0–0.2)
BASOPHILS NFR BLD AUTO: 0.4 % — SIGNIFICANT CHANGE UP (ref 0–1)
BUN SERPL-MCNC: 15 MG/DL — SIGNIFICANT CHANGE UP (ref 10–20)
BUN SERPL-MCNC: 17 MG/DL — SIGNIFICANT CHANGE UP (ref 10–20)
CALCIUM SERPL-MCNC: 6.9 MG/DL — LOW (ref 8.5–10.1)
CALCIUM SERPL-MCNC: 7.8 MG/DL — LOW (ref 8.5–10.1)
CHLORIDE SERPL-SCNC: 104 MMOL/L — SIGNIFICANT CHANGE UP (ref 98–110)
CHLORIDE SERPL-SCNC: 110 MMOL/L — SIGNIFICANT CHANGE UP (ref 98–110)
CK MB CFR SERPL CALC: 14.5 NG/ML — HIGH (ref 0.6–6.3)
CK SERPL-CCNC: 152 U/L — SIGNIFICANT CHANGE UP (ref 0–225)
CO2 SERPL-SCNC: 18 MMOL/L — SIGNIFICANT CHANGE UP (ref 17–32)
CO2 SERPL-SCNC: 19 MMOL/L — SIGNIFICANT CHANGE UP (ref 17–32)
CREAT SERPL-MCNC: 1 MG/DL — SIGNIFICANT CHANGE UP (ref 0.7–1.5)
CREAT SERPL-MCNC: 1 MG/DL — SIGNIFICANT CHANGE UP (ref 0.7–1.5)
EOSINOPHIL # BLD AUTO: 0.47 K/UL — SIGNIFICANT CHANGE UP (ref 0–0.7)
EOSINOPHIL NFR BLD AUTO: 5.1 % — SIGNIFICANT CHANGE UP (ref 0–8)
GLUCOSE SERPL-MCNC: 136 MG/DL — HIGH (ref 70–99)
GLUCOSE SERPL-MCNC: 193 MG/DL — HIGH (ref 70–99)
HCT VFR BLD CALC: 29.8 % — LOW (ref 37–47)
HCT VFR BLD CALC: 34.6 % — LOW (ref 37–47)
HGB BLD-MCNC: 11.1 G/DL — LOW (ref 12–16)
HGB BLD-MCNC: 9.7 G/DL — LOW (ref 12–16)
IMM GRANULOCYTES NFR BLD AUTO: 0.3 % — SIGNIFICANT CHANGE UP (ref 0.1–0.3)
LACTATE SERPL-SCNC: 1.4 MMOL/L — SIGNIFICANT CHANGE UP (ref 0.7–2)
LDH SERPL L TO P-CCNC: 216 — SIGNIFICANT CHANGE UP (ref 50–242)
LYMPHOCYTES # BLD AUTO: 0.95 K/UL — LOW (ref 1.2–3.4)
LYMPHOCYTES # BLD AUTO: 10.3 % — LOW (ref 20.5–51.1)
MAGNESIUM SERPL-MCNC: 2.3 MG/DL — SIGNIFICANT CHANGE UP (ref 1.8–2.4)
MCHC RBC-ENTMCNC: 30.8 PG — SIGNIFICANT CHANGE UP (ref 27–31)
MCHC RBC-ENTMCNC: 31.1 PG — HIGH (ref 27–31)
MCHC RBC-ENTMCNC: 32.1 G/DL — SIGNIFICANT CHANGE UP (ref 32–37)
MCHC RBC-ENTMCNC: 32.6 G/DL — SIGNIFICANT CHANGE UP (ref 32–37)
MCV RBC AUTO: 95.5 FL — SIGNIFICANT CHANGE UP (ref 81–99)
MCV RBC AUTO: 96.1 FL — SIGNIFICANT CHANGE UP (ref 81–99)
MONOCYTES # BLD AUTO: 0.6 K/UL — SIGNIFICANT CHANGE UP (ref 0.1–0.6)
MONOCYTES NFR BLD AUTO: 6.5 % — SIGNIFICANT CHANGE UP (ref 1.7–9.3)
NEUTROPHILS # BLD AUTO: 7.1 K/UL — HIGH (ref 1.4–6.5)
NEUTROPHILS NFR BLD AUTO: 77.4 % — HIGH (ref 42.2–75.2)
NRBC # BLD: 0 /100 WBCS — SIGNIFICANT CHANGE UP (ref 0–0)
NRBC # BLD: 0 /100 WBCS — SIGNIFICANT CHANGE UP (ref 0–0)
NT-PROBNP SERPL-SCNC: HIGH PG/ML (ref 0–300)
PHOSPHATE SERPL-MCNC: 1.5 MG/DL — LOW (ref 2.1–4.9)
PLATELET # BLD AUTO: 158 K/UL — SIGNIFICANT CHANGE UP (ref 130–400)
PLATELET # BLD AUTO: 162 K/UL — SIGNIFICANT CHANGE UP (ref 130–400)
POTASSIUM SERPL-MCNC: 3 MMOL/L — LOW (ref 3.5–5)
POTASSIUM SERPL-MCNC: 3.7 MMOL/L — SIGNIFICANT CHANGE UP (ref 3.5–5)
POTASSIUM SERPL-SCNC: 3 MMOL/L — LOW (ref 3.5–5)
POTASSIUM SERPL-SCNC: 3.7 MMOL/L — SIGNIFICANT CHANGE UP (ref 3.5–5)
RAPID RVP RESULT: SIGNIFICANT CHANGE UP
RBC # BLD: 3.12 M/UL — LOW (ref 4.2–5.4)
RBC # BLD: 3.6 M/UL — LOW (ref 4.2–5.4)
RBC # FLD: 14.3 % — SIGNIFICANT CHANGE UP (ref 11.5–14.5)
RBC # FLD: 14.3 % — SIGNIFICANT CHANGE UP (ref 11.5–14.5)
SARS-COV-2 RNA SPEC QL NAA+PROBE: SIGNIFICANT CHANGE UP
SODIUM SERPL-SCNC: 137 MMOL/L — SIGNIFICANT CHANGE UP (ref 135–146)
SODIUM SERPL-SCNC: 141 MMOL/L — SIGNIFICANT CHANGE UP (ref 135–146)
TROPONIN T SERPL-MCNC: 0.18 NG/ML — CRITICAL HIGH
TROPONIN T SERPL-MCNC: 0.21 NG/ML — CRITICAL HIGH
TROPONIN T SERPL-MCNC: 0.21 NG/ML — CRITICAL HIGH
WBC # BLD: 11.62 K/UL — HIGH (ref 4.8–10.8)
WBC # BLD: 9.19 K/UL — SIGNIFICANT CHANGE UP (ref 4.8–10.8)
WBC # FLD AUTO: 11.62 K/UL — HIGH (ref 4.8–10.8)
WBC # FLD AUTO: 9.19 K/UL — SIGNIFICANT CHANGE UP (ref 4.8–10.8)

## 2021-10-22 PROCEDURE — 93306 TTE W/DOPPLER COMPLETE: CPT | Mod: 26

## 2021-10-22 PROCEDURE — 99232 SBSQ HOSP IP/OBS MODERATE 35: CPT

## 2021-10-22 PROCEDURE — 76700 US EXAM ABDOM COMPLETE: CPT | Mod: 26

## 2021-10-22 PROCEDURE — 93010 ELECTROCARDIOGRAM REPORT: CPT

## 2021-10-22 PROCEDURE — 74176 CT ABD & PELVIS W/O CONTRAST: CPT | Mod: 26

## 2021-10-22 PROCEDURE — ZZZZZ: CPT

## 2021-10-22 PROCEDURE — 99223 1ST HOSP IP/OBS HIGH 75: CPT

## 2021-10-22 RX ORDER — SODIUM,POTASSIUM PHOSPHATES 278-250MG
1 POWDER IN PACKET (EA) ORAL
Refills: 0 | Status: DISCONTINUED | OUTPATIENT
Start: 2021-10-22 | End: 2021-10-28

## 2021-10-22 RX ORDER — SODIUM CHLORIDE 9 MG/ML
250 INJECTION, SOLUTION INTRAVENOUS
Refills: 0 | Status: DISCONTINUED | OUTPATIENT
Start: 2021-10-22 | End: 2021-10-22

## 2021-10-22 RX ORDER — POTASSIUM PHOSPHATE, MONOBASIC POTASSIUM PHOSPHATE, DIBASIC 236; 224 MG/ML; MG/ML
30 INJECTION, SOLUTION INTRAVENOUS ONCE
Refills: 0 | Status: COMPLETED | OUTPATIENT
Start: 2021-10-22 | End: 2021-10-22

## 2021-10-22 RX ORDER — MIDODRINE HYDROCHLORIDE 2.5 MG/1
10 TABLET ORAL EVERY 8 HOURS
Refills: 0 | Status: DISCONTINUED | OUTPATIENT
Start: 2021-10-22 | End: 2021-10-28

## 2021-10-22 RX ORDER — FUROSEMIDE 40 MG
40 TABLET ORAL DAILY
Refills: 0 | Status: DISCONTINUED | OUTPATIENT
Start: 2021-10-22 | End: 2021-10-25

## 2021-10-22 RX ORDER — SODIUM,POTASSIUM PHOSPHATES 278-250MG
1 POWDER IN PACKET (EA) ORAL EVERY 8 HOURS
Refills: 0 | Status: DISCONTINUED | OUTPATIENT
Start: 2021-10-22 | End: 2021-10-22

## 2021-10-22 RX ORDER — HEPARIN SODIUM 5000 [USP'U]/ML
INJECTION INTRAVENOUS; SUBCUTANEOUS
Qty: 25000 | Refills: 0 | Status: DISCONTINUED | OUTPATIENT
Start: 2021-10-22 | End: 2021-10-22

## 2021-10-22 RX ORDER — METOPROLOL TARTRATE 50 MG
50 TABLET ORAL
Refills: 0 | Status: DISCONTINUED | OUTPATIENT
Start: 2021-10-22 | End: 2021-10-27

## 2021-10-22 RX ORDER — POTASSIUM CHLORIDE 20 MEQ
20 PACKET (EA) ORAL
Refills: 0 | Status: DISCONTINUED | OUTPATIENT
Start: 2021-10-22 | End: 2021-10-22

## 2021-10-22 RX ORDER — HEPARIN SODIUM 5000 [USP'U]/ML
850 INJECTION INTRAVENOUS; SUBCUTANEOUS
Qty: 25000 | Refills: 0 | Status: DISCONTINUED | OUTPATIENT
Start: 2021-10-22 | End: 2021-10-24

## 2021-10-22 RX ORDER — ACETAMINOPHEN 500 MG
650 TABLET ORAL EVERY 6 HOURS
Refills: 0 | Status: DISCONTINUED | OUTPATIENT
Start: 2021-10-22 | End: 2021-10-28

## 2021-10-22 RX ORDER — POTASSIUM CHLORIDE 20 MEQ
20 PACKET (EA) ORAL
Refills: 0 | Status: COMPLETED | OUTPATIENT
Start: 2021-10-22 | End: 2021-10-22

## 2021-10-22 RX ORDER — HEPARIN SODIUM 5000 [USP'U]/ML
1000 INJECTION INTRAVENOUS; SUBCUTANEOUS
Qty: 25000 | Refills: 0 | Status: DISCONTINUED | OUTPATIENT
Start: 2021-10-22 | End: 2021-10-22

## 2021-10-22 RX ADMIN — CHLORHEXIDINE GLUCONATE 1 APPLICATION(S): 213 SOLUTION TOPICAL at 05:30

## 2021-10-22 RX ADMIN — AZITHROMYCIN 255 MILLIGRAM(S): 500 TABLET, FILM COATED ORAL at 01:31

## 2021-10-22 RX ADMIN — HEPARIN SODIUM 8.5 UNIT(S)/HR: 5000 INJECTION INTRAVENOUS; SUBCUTANEOUS at 20:45

## 2021-10-22 RX ADMIN — TIOTROPIUM BROMIDE 1 CAPSULE(S): 18 CAPSULE ORAL; RESPIRATORY (INHALATION) at 02:11

## 2021-10-22 RX ADMIN — ENOXAPARIN SODIUM 80 MILLIGRAM(S): 100 INJECTION SUBCUTANEOUS at 05:28

## 2021-10-22 RX ADMIN — GABAPENTIN 300 MILLIGRAM(S): 400 CAPSULE ORAL at 01:31

## 2021-10-22 RX ADMIN — PANTOPRAZOLE SODIUM 40 MILLIGRAM(S): 20 TABLET, DELAYED RELEASE ORAL at 12:00

## 2021-10-22 RX ADMIN — Medication 3 MILLILITER(S): at 10:40

## 2021-10-22 RX ADMIN — Medication 40 MILLIGRAM(S): at 15:46

## 2021-10-22 RX ADMIN — Medication 50 MILLIEQUIVALENT(S): at 12:02

## 2021-10-22 RX ADMIN — Medication 3 MILLILITER(S): at 14:23

## 2021-10-22 RX ADMIN — CLOPIDOGREL BISULFATE 75 MILLIGRAM(S): 75 TABLET, FILM COATED ORAL at 12:00

## 2021-10-22 RX ADMIN — LAMOTRIGINE 100 MILLIGRAM(S): 25 TABLET, ORALLY DISINTEGRATING ORAL at 01:31

## 2021-10-22 RX ADMIN — Medication 3 MILLILITER(S): at 02:57

## 2021-10-22 RX ADMIN — CEFTRIAXONE 100 MILLIGRAM(S): 500 INJECTION, POWDER, FOR SOLUTION INTRAMUSCULAR; INTRAVENOUS at 01:30

## 2021-10-22 RX ADMIN — Medication 650 MILLIGRAM(S): at 23:35

## 2021-10-22 RX ADMIN — Medication 50 MILLIEQUIVALENT(S): at 14:43

## 2021-10-22 RX ADMIN — AZITHROMYCIN 255 MILLIGRAM(S): 500 TABLET, FILM COATED ORAL at 22:31

## 2021-10-22 RX ADMIN — Medication 650 MILLIGRAM(S): at 16:25

## 2021-10-22 RX ADMIN — Medication 50 MILLIEQUIVALENT(S): at 17:17

## 2021-10-22 RX ADMIN — QUETIAPINE FUMARATE 200 MILLIGRAM(S): 200 TABLET, FILM COATED ORAL at 01:31

## 2021-10-22 RX ADMIN — ATORVASTATIN CALCIUM 20 MILLIGRAM(S): 80 TABLET, FILM COATED ORAL at 22:06

## 2021-10-22 RX ADMIN — MIDODRINE HYDROCHLORIDE 10 MILLIGRAM(S): 2.5 TABLET ORAL at 04:08

## 2021-10-22 RX ADMIN — POTASSIUM PHOSPHATE, MONOBASIC POTASSIUM PHOSPHATE, DIBASIC 83.33 MILLIMOLE(S): 236; 224 INJECTION, SOLUTION INTRAVENOUS at 05:57

## 2021-10-22 RX ADMIN — Medication 0.5 MILLIGRAM(S): at 22:06

## 2021-10-22 RX ADMIN — Medication 1 PACKET(S): at 12:00

## 2021-10-22 RX ADMIN — Medication 3 MILLILITER(S): at 19:07

## 2021-10-22 RX ADMIN — Medication 0.5 MILLIGRAM(S): at 00:28

## 2021-10-22 RX ADMIN — MIDODRINE HYDROCHLORIDE 10 MILLIGRAM(S): 2.5 TABLET ORAL at 14:40

## 2021-10-22 RX ADMIN — CEFTRIAXONE 100 MILLIGRAM(S): 500 INJECTION, POWDER, FOR SOLUTION INTRAMUSCULAR; INTRAVENOUS at 22:31

## 2021-10-22 RX ADMIN — Medication 650 MILLIGRAM(S): at 15:43

## 2021-10-22 RX ADMIN — Medication 8.08 MICROGRAM(S)/KG/MIN: at 01:51

## 2021-10-22 NOTE — CONSULT NOTE ADULT - ASSESSMENT
77 y/o PMHx COPD on home o2, A-Fib, CVA hx on aspirin and Plavix, Emphysema, HTN, s/p Carotid arterioplasty and Covid-19 in January, subsequently vaccinated, presented for the evaluation of SOB and fever Tmax 102 x 4 days and one day c/o Left calf pain. Also endorsed non radiating midsternal pain/pressure. Was found to be in Afib with RVR    Impression:  #Afib with RVR  #NSTEMI  #Hypokalemia  #COPD/Emphysema    Plan  -c/w telemetry monitoring in CCU  -Pt currently rate controlled. C/w Metoprolol, change to 50mg x2 day  -Troponin trending up <0.01-->0.21. Continue to trend  -CK-MB elevated 14.5  -Pt received therapeutic Lovenox this morning. Cardiac cath on Monday am. Keep NPO after midnight on 10/24  -Pt allergic to IV contrast. Prednisone 40mg PO daily x2 days starting tomorrow for pre-cath  -Start Heparin IV gtt. Serial aPTT   -K;3.0-Repeat lytes and replete as needed   -Serial EKG to assess for resolution of ST changes  -Monitor vital signs to monitor hemodynamic stability  -Low fat DASH diet  -ECHO: 2D ECHO to evaluate LV function and wall motion abnormalities  -C/w reyna, f/u with pulmonary    Discussed with Dr Bishop 77 y/o PMHx COPD on home o2, A-Fib, CVA hx on aspirin and Plavix, Emphysema, HTN, s/p Carotid arterioplasty and Covid-19 in January, subsequently vaccinated, presented for the evaluation of SOB and fever Tmax 102 x 4 days and one day c/o Left calf pain. Also endorsed non radiating midsternal pain/pressure. Was found to be in Afib with RVR    Impression:  #Afib with RVR  #NSTEMI  #Hypokalemia  #COPD/Emphysema    Plan  -c/w telemetry monitoring in CCU  -Pt currently rate controlled. C/w Metoprolol, change to 50mg x2 day  -Troponin trending up <0.01-->0.21. Continue to trend  -CK-MB elevated 14.5  -Pt received therapeutic Lovenox this morning. Cardiac cath on Monday am. Keep NPO after midnight on 10/24  -Pt allergic to IV contrast. Prednisone 40mg PO daily x2 days starting tomorrow for pre-cath  -Start Heparin IV gtt. Serial aPTT   -K;3.0-Repeat lytes and replete as needed   -Marked ST depressions in repeat EKG. Serial EKG to assess for resolution of ST changes  -Monitor vital signs to monitor hemodynamic stability  -Low fat DASH diet  -ECHO: 2D ECHO to evaluate LV function and wall motion abnormalities  -C/w [place] f/u with pulmonary    Discussed with Dr Bishop

## 2021-10-22 NOTE — PROGRESS NOTE ADULT - SUBJECTIVE AND OBJECTIVE BOX
SHAUN COATES  76y, Female  Allergy: codeine (Other (Moderate))  Depakote (Unknown)  Dilaudid (Short breath; Rash)  IV Contrast (Anaphylaxis)  losartan (Angioedema)  Risperdal (Other)  verapamil (Short breath; Angioedema)      CHIEF COMPLAINT: Difficulty Breathing (22 Oct 2021 12:28)      HPI:  77 y/o PMHx COPD on home o2, A-Fib, CVA hx on aspirin and Plavix, Emphysema, HTN, s/p Carotid artheroplasty and Covid-19 in January, subsequently vaccinated.  Pt presented to the ED today with SOB and fever Tmax 102 x 4 days and one day c/o Left calf pain.   Pt denies hemoptysis or cough, but also complains of mild chest discomfort.  At present time patient is on BiPaP, s/p Cardizem push 20mg and IV infusion, and Amidarone Bolus still in afib rate uncontrolled.  Pt was given Lopressor 5mg IVP at bedside and rate now controlled in 70's.   Pt admitted to relief of chest discomfort.  Pulmonologist : Dr Vickers  Cardiologist:  Dr Bishop (21 Oct 2021 21:20)    HPI:    FAMILY HISTORY:  No pertinent family history in first degree relatives      PAST MEDICAL & SURGICAL HISTORY:  Hypertension    Depression    COPD (chronic obstructive pulmonary disease)    Other cardiomyopathy    Other emphysema    PVD (peripheral vascular disease)    Allergic reaction    Bipolar 1 disorder    FLAVIO on CPAP    Transient ischemic attack    Congestive heart failure    Falls     novel coronavirus disease (COVID-19)    Respiratory failure requiring intubation    Afib    History of cholecystectomy    H/O carotid angioplasty        SOCIAL HISTORY  Social History:  Tobacco use: quit 25 yrs ago, 2pk per day  EtOH use: denies  Illicit drug use: denies  Marital Status: lives at home with  (21 Oct 2021 21:20)        ROS  General: Denies fevers, chills, nightsweats, weight loss  HEENT: Denies headache, rhinorrhea, sore throat, eye pain  CV: Denies CP, palpitations  PULM: Denies SOB, cough  GI: Denies abdominal pain, diarrhea  : Denies dysuria, hematuria  MSK: Denies arthralgias  SKIN: Denies rash   NEURO: Denies paresthesias, weakness  PSYCH: Denies depression    VITALS:  T(F): 99.9, Max: 100.5 (10-21-21 @ 17:54)  HR: 101  BP: 107/54  RR: 27Vital Signs Last 24 Hrs  T(C): 37.7 (22 Oct 2021 07:10), Max: 38.1 (21 Oct 2021 17:54)  T(F): 99.9 (22 Oct 2021 07:10), Max: 100.5 (21 Oct 2021 17:54)  HR: 101 (22 Oct 2021 10:33) (73 - 188)  BP: 107/54 (22 Oct 2021 10:33) (75/51 - 146/84)  BP(mean): 75 (22 Oct 2021 10:33) (61 - 84)  RR: 27 (22 Oct 2021 10:33) (15 - 39)  SpO2: 96% (22 Oct 2021 10:33) (93% - 100%)    PHYSICAL EXAM:  Gen: NAD, resting in bed obese  HEENT: Normocephalic, atraumatic  Neck: supple, no lymphadenopathy  CV: Regular rate & regular rhythm  Lungs: decreased BS at bases, no fremitus  Abdomen: Soft, BS present  Ext: ttp bilat ,edema  Neuro: non focal, awake  Skin: no rash, no erythema  Psych: no SI, HI, Hallucination     TESTS & MEASUREMENTS:                        11.1   11.62 )-----------( 162      ( 22 Oct 2021 04:45 )             34.6     10-22    137  |  104  |  17  ----------------------------<  193<H>  3.0<L>   |  18  |  1.0    Ca    7.8<L>      22 Oct 2021 04:45  Phos  1.5     10-22  Mg     2.3     10-22    TPro  6.0  /  Alb  3.6  /  TBili  0.6  /  DBili  x   /  AST  19  /  ALT  8   /  AlkPhos  113  10-21    eGFR if Non African American: 55 mL/min/1.73M2 (10-22-21 @ 04:45)  eGFR if African American: 64 mL/min/1.73M2 (10-22-21 @ 04:45)    LIVER FUNCTIONS - ( 21 Oct 2021 13:56 )  Alb: 3.6 g/dL / Pro: 6.0 g/dL / ALK PHOS: 113 U/L / ALT: 8 U/L / AST: 19 U/L / GGT: x           Urinalysis Basic - ( 21 Oct 2021 14:44 )    Color: Yellow / Appearance: Slightly Cloudy / S.020 / pH: x  Gluc: x / Ketone: Negative  / Bili: Negative / Urobili: 0.2 mg/dL   Blood: x / Protein: 30 mg/dL / Nitrite: Positive   Leuk Esterase: Large / RBC: 3-5 /HPF / WBC >50 /HPF   Sq Epi: x / Non Sq Epi: Few /HPF / Bacteria: Moderate          Lactate, Blood: 1.4 mmol/L (10-22-21 @ 04:45)  Lactate, Blood: 1.3 mmol/L (10-21-21 @ 13:56)    QRS axis to [] ° and NSR at a rate of [] BPM. There was no atrial enlargement. There was no ventricular hypertrophy. There were no ST-T changes and all intervals were normal.      INFECTIOUS DISEASES TESTING  MRSA PCR Result.: Negative (20 @ 14:33)      RADIOLOGY & ADDITIONAL TESTS:  I have personally reviewed the last Chest xray  CXR  Xray Chest 1 View- PORTABLE-Urgent:   EXAM:  XR CHEST PORTABLE URGENT 1V            PROCEDURE DATE:  10/21/2021            INTERPRETATION:  Clinical History / Reason for exam: Chest pain    Comparison : Chest radiograph 2021.    Technique/Positioning: Frontal, portable, low lung volumes.    Findings:    Support devices: Telemetry leads overlie patient    Cardiac/mediastinum/hilum: Stable    Lung parenchyma/Pleura: Bilateral perihilar opacities, left greater than right    Skeleton/soft tissues: Stable    Impression:    Bilateralperihilar opacities, left greater than right        --- End of Report ---              KARMEN MURPHY MD; Attending Radiologist  This document has been electronically signed. Oct 21 2021  4:17PM (10-21-21 @ 15:07)      CT      CARDIOLOGY TESTING  12 Lead ECG:   Ventricular Rate 144 BPM    Atrial Rate 192 BPM    QRS Duration 94 ms    Q-T Interval 270 ms    QTC Calculation(Bazett) 418 ms    R Axis 39 degrees    T Axis 231 degrees    Diagnosis Line Atrial fibrillation with rapid ventricular response  Marked ST abnormality, possible inferior subendocardial injury  Marked ST abnormality, possible anterolateral subendocardial injury  Abnormal ECG  Confirmed by VIVIENNE BISHOP MD ([id number]) on 10/21/2021 7:10:15 PM (10-21-21 @ 18:23)  12 Lead ECG:   Ventricular Rate 148 BPM    Atrial Rate 153 BPM    QRS Duration 94 ms    Q-T Interval 262 ms    QTC Calculation(Bazett) 411 ms    P Axis 217 degrees    R Axis 39 degrees    T Axis 230 degrees    Diagnosis Line Unusual P axis, possible ectopic atrial tachycardia with undetermined rhythm  irregularity  Marked ST abnormality, possible inferior subendocardial injury  Marked ST abnormality, possible anterolateral subendocardial injury  Abnormal ECG    Confirmed by VIVIENNE BISHOP MD ([id number]) on 10/21/2021 7:09:20 PM (10-21-21 @ 18:22)      MEDICATIONS  albuterol/ipratropium for Nebulization 3  aspirin  chewable 81  atorvastatin 20  azithromycin  IVPB   azithromycin  IVPB 500  cefTRIAXone   IVPB 1000  cefTRIAXone   IVPB   chlorhexidine 4% Liquid 1  clopidogrel Tablet 75  heparin  Infusion 1000  metoprolol tartrate 50  midodrine 10  norepinephrine Infusion 0.05  pantoprazole  Injectable 40  potassium chloride  20 mEq/100 mL IVPB 20  potassium phosphate / sodium phosphate Powder (PHOS-NaK) 1      ANTIBIOTICS:  azithromycin  IVPB      azithromycin  IVPB 500 milliGRAM(s) IV Intermittent every 24 hours  cefTRIAXone   IVPB 1000 milliGRAM(s) IV Intermittent every 24 hours  cefTRIAXone   IVPB          All available historical data has been reviewed    ASSESSMENT  76y F admitted with SEPSIS;ACUTE UTI;PAROXYSMAL ATRIAL FIBRILLATION WITH RVR

## 2021-10-22 NOTE — CONSULT NOTE ADULT - ASSESSMENT
ASSESSMENT  77 y/o PMHx COPD on home o2, A-Fib, CVA hx on aspirin and Plavix, Emphysema, HTN, s/p Carotid artheroplasty and Covid-19 in 1/2021 who presents with fevers and left calf pain      IMPRESSION  #Septic Shock - CAP  #Atrial Fibrillation with RVR  #Bilateral Leg Pain   #COPD  #Hx of CVA  #Obesity BMI (kg/m2): 36.8  #DM    #Abx allergy: codeine (Other (Moderate))  Depakote (Unknown)  Dilaudid (Short breath; Rash)  IV Contrast (Anaphylaxis)  losartan (Angioedema)  Risperdal (Other)  verapamil (Short breath; Angioedema)    RECOMMENDATIONS  - check urine legionella and urine Strep antigen  - follow-up procalcitonin   - continue ceftriaxone 1g daily  - continue azitromycin 500 mg daily   - follow-up blood cultures  - follow-up VQ scan/ Lower extremity venous dopplers    Please call or message on Microsoft Teams if with any questions.  Spectra [phone]

## 2021-10-22 NOTE — CONSULT NOTE ADULT - ATTENDING COMMENTS
Patient sob. But sat good. Check cxr. If no infection. May cath monday. She will need steroids prior

## 2021-10-22 NOTE — CONSULT NOTE ADULT - SUBJECTIVE AND OBJECTIVE BOX
HPI:  75 y/o PMHx COPD on home o2, A-Fib, CVA hx on aspirin and Plavix, Emphysema, HTN, s/p Carotid artheroplasty and Covid-19 in January, subsequently vaccinated, presented for the evaluation of SOB and fever Tmax 102 x 4 days and one day c/o Left calf pain. Pt denies hemoptysis or cough, but also endorsed non radiating midsternal chest pain/pressure at home and arrival in the hospital. On arrival in the ED was found to be on Afib with RVR , and received Cardizem push 20mg and IV infusion, and Amiodarone Bolus still in afib rate uncontrolled.  Pt was given Lopressor 5mg IVP at bedside and rate now controlled. Currently Pt denies any chest pain/pressure and is on NC.        PAST MEDICAL & SURGICAL HISTORY  Hypertension    Depression    COPD (chronic obstructive pulmonary disease)    Other cardiomyopathy    Other emphysema    PVD (peripheral vascular disease)    Allergic reaction    Bipolar 1 disorder    FLAVIO on CPAP    Transient ischemic attack    Congestive heart failure    Falls    2019 novel coronavirus disease (COVID-19)    Respiratory failure requiring intubation    Afib    History of cholecystectomy    H/O carotid angioplasty        FAMILY HISTORY:  FAMILY HISTORY:  No pertinent family history in first degree relatives        SOCIAL HISTORY:  []smoker  []Alcohol  []Drug    ALLERGIES:  codeine (Other (Moderate))  Depakote (Unknown)  Dilaudid (Short breath; Rash)  IV Contrast (Anaphylaxis)  losartan (Angioedema)  Risperdal (Other)  verapamil (Short breath; Angioedema)      MEDICATIONS:  MEDICATIONS  (STANDING):  albuterol/ipratropium for Nebulization 3 milliLiter(s) Nebulizer every 6 hours  aspirin  chewable 81 milliGRAM(s) Oral daily  atorvastatin 20 milliGRAM(s) Oral at bedtime  azithromycin  IVPB      azithromycin  IVPB 500 milliGRAM(s) IV Intermittent every 24 hours  cefTRIAXone   IVPB 1000 milliGRAM(s) IV Intermittent every 24 hours  cefTRIAXone   IVPB      chlorhexidine 4% Liquid 1 Application(s) Topical <User Schedule>  clopidogrel Tablet 75 milliGRAM(s) Oral daily  heparin  Infusion.  Unit(s)/Hr (10 mL/Hr) IV Continuous <Continuous>  metoprolol tartrate 100 milliGRAM(s) Oral daily  midodrine 10 milliGRAM(s) Oral every 8 hours  norepinephrine Infusion 0.05 MICROgram(s)/kG/Min (8.08 mL/Hr) IV Continuous <Continuous>  pantoprazole  Injectable 40 milliGRAM(s) IV Push daily  potassium chloride  20 mEq/100 mL IVPB 20 milliEquivalent(s) IV Intermittent every 2 hours  potassium phosphate / sodium phosphate Powder (PHOS-NaK) 1 Packet(s) Oral three times a day before meals  sodium chloride 0.45%. 250 milliLiter(s) (60 mL/Hr) IV Continuous <Continuous>    MEDICATIONS  (PRN):  acetaminophen  Suppository .. 650 milliGRAM(s) Rectal every 8 hours PRN Temp greater or equal to 38C (100.4F)  LORazepam     Tablet 0.5 milliGRAM(s) Oral two times a day PRN Anxiety      HOME MEDICATIONS:  Home Medications:  albuterol 90 mcg/inh inhalation aerosol: 1 puff(s) inhaled every 4 hours, As needed, Shortness of Breath (09 Jan 2021 11:36)  amLODIPine 5 mg oral tablet: 1 tab(s) orally once a day (09 Jan 2021 12:40)  aspirin 81 mg oral tablet, chewable: 1 tab(s) orally once a day (09 Jan 2021 11:36)  atorvastatin 20 mg oral tablet: 1 tab(s) orally once a day (at bedtime) (09 Jan 2021 11:39)  budesonide-formoterol 160 mcg-4.5 mcg/inh inhalation aerosol: 2 puff(s) inhaled 2 times a day (29 Nov 2020 16:46)  clopidogrel 75 mg oral tablet: 1 tab(s) orally once a day (09 Jan 2021 11:36)  gabapentin 300 mg oral capsule: 1 cap(s) orally 3 times a day (09 Jan 2021 11:39)  lamoTRIgine 100 mg oral tablet: 1 tab(s) orally once a day (09 Jan 2021 11:39)  LORazepam 0.5 mg oral tablet: 1 tab(s) orally 2 times a day, As Needed (09 Jan 2021 13:00)  metoprolol tartrate 100 mg oral tablet: 1 tab(s) orally every 12 hours (09 Jan 2021 11:36)  montelukast 10 mg oral tablet: 1 tab(s) orally once a day (29 Nov 2020 16:46)  pantoprazole 40 mg oral delayed release tablet: 1 tab(s) orally once a day (before a meal) (09 Jan 2021 11:36)  QUEtiapine 200 mg oral tablet: 1 tab(s) orally once a day (at bedtime) (09 Jan 2021 11:39)  senna oral tablet: 2 tab(s) orally once a day (at bedtime) (09 Jan 2021 11:36)  tiotropium 18 mcg inhalation capsule: 1 cap(s) inhaled once a day (09 Jan 2021 11:36)      VITALS:   T(F): 99.9 (10-22 @ 07:10), Max: 102.8 (10-21 @ 14:21)  HR: 107 (10-22 @ 07:50) (73 - 188)  BP: 125/63 (10-22 @ 07:50) (75/51 - 146/84)  BP(mean): 84 (10-22 @ 07:50) (61 - 84)  RR: 39 (10-22 @ 07:50) (15 - 39)  SpO2: 93% (10-22 @ 07:50) (93% - 100%)    I&O's Summary    21 Oct 2021 07:01  -  22 Oct 2021 07:00  --------------------------------------------------------  IN: 1265 mL / OUT: 400 mL / NET: 865 mL        REVIEW OF SYSTEMS:  CONSTITUTIONAL: No weakness, fevers or chills  EYES: No visual changes  ENT: No vertigo or throat pain   NECK: No pain or stiffness  RESPIRATORY: No cough, wheezing, hemoptysis; No shortness of breath  CARDIOVASCULAR: No chest pain or palpitations  GASTROINTESTINAL: No abdominal or epigastric pain. No nausea, vomiting, or hematemesis; No diarrhea or constipation. No melena or hematochezia.  GENITOURINARY: No dysuria, frequency or hematuria  NEUROLOGICAL: No numbness or weakness  SKIN: No itching, no rashes  MSK: no    PHYSICAL EXAM:  NEURO: patient is awake , alert and oriented  GEN: Not in acute distress  NECK: no thyroid enlargement, no JVD  LUNGS: Clear to auscultation bilaterally   CARDIOVASCULAR: S1/S2 present, RRR , no murmurs or rubs, no carotid bruits,  + PP bilaterally  ABD: Soft, non-tender, non-distended, +BS  EXT: No DEVAN  SKIN: Intact    LABS:                        11.1   11.62 )-----------( 162      ( 22 Oct 2021 04:45 )             34.6     10-22    137  |  104  |  17  ----------------------------<  193<H>  3.0<L>   |  18  |  1.0    Ca    7.8<L>      22 Oct 2021 04:45  Phos  1.5     10-22  Mg     2.3     10-22    TPro  6.0  /  Alb  3.6  /  TBili  0.6  /  DBili  x   /  AST  19  /  ALT  8   /  AlkPhos  113  10-21    PT/INR - ( 21 Oct 2021 15:10 )   PT: 15.80 sec;   INR: 1.38 ratio         PTT - ( 21 Oct 2021 15:10 )  PTT:37.0 sec  Lactate, Blood: 1.4 mmol/L (10-22-21 @ 04:45)  Creatine Kinase, Serum: 152 U/L (10-22-21 @ 04:45)  Troponin T, Serum: 0.21 ng/mL *HH* (10-22-21 @ 04:45)  Troponin T, Serum: <0.01 ng/mL (10-21-21 @ 13:56)  Lactate, Blood: 1.3 mmol/L (10-21-21 @ 13:56)    CARDIAC MARKERS ( 22 Oct 2021 04:45 )  x     / 0.21 ng/mL / 152 U/L / x     / 14.5 ng/mL  CARDIAC MARKERS ( 21 Oct 2021 13:56 )  x     / <0.01 ng/mL / x     / x     / x          Serum Pro-Brain Natriuretic Peptide: 51487 pg/mL (10-22-21 @ 04:45)  Serum Pro-Brain Natriuretic Peptide: 5716 pg/mL (10-21-21 @ 13:56)          RADIOLOGY:  -CXR:  < from: Xray Chest 1 View- PORTABLE-Urgent (10.21.21 @ 15:07) >  Impression:    Bilateralperihilar opacities, left greater than right    < end of copied text >     HPI:  77 y/o PMHx COPD on home o2, A-Fib, CVA hx on aspirin and Plavix, Emphysema, HTN, s/p Carotid artheroplasty and Covid-19 in January, subsequently vaccinated, presented for the evaluation of SOB and fever Tmax 102 x 4 days and one day c/o Left calf pain. Pt denies hemoptysis or cough, but also endorsed non radiating midsternal chest pain/pressure at home and arrival in the hospital. On arrival in the ED was found to be on Afib with RVR , and received Cardizem push 20mg and IV infusion, and Amiodarone Bolus still in afib rate uncontrolled. Pt was given Lopressor 5mg IVP at bedside and rate now controlled. Currently Pt denies any chest pain/pressure os SOB and is onO2 via NC @2LPM.        PAST MEDICAL & SURGICAL HISTORY  Hypertension    Depression    COPD (chronic obstructive pulmonary disease)    Other cardiomyopathy    Other emphysema    PVD (peripheral vascular disease)    Allergic reaction    Bipolar 1 disorder    FLAVIO on CPAP    Transient ischemic attack    Congestive heart failure    Falls    2019 novel coronavirus disease (COVID-19)    Respiratory failure requiring intubation    Afib    History of cholecystectomy    H/O carotid angioplasty        FAMILY HISTORY:  FAMILY HISTORY:  No pertinent family history in first degree relatives        SOCIAL HISTORY:  []smoker  []Alcohol  []Drug    ALLERGIES:  codeine (Other (Moderate))  Depakote (Unknown)  Dilaudid (Short breath; Rash)  IV Contrast (Anaphylaxis)  losartan (Angioedema)  Risperdal (Other)  verapamil (Short breath; Angioedema)      MEDICATIONS:  MEDICATIONS  (STANDING):  albuterol/ipratropium for Nebulization 3 milliLiter(s) Nebulizer every 6 hours  aspirin  chewable 81 milliGRAM(s) Oral daily  atorvastatin 20 milliGRAM(s) Oral at bedtime  azithromycin  IVPB      azithromycin  IVPB 500 milliGRAM(s) IV Intermittent every 24 hours  cefTRIAXone   IVPB 1000 milliGRAM(s) IV Intermittent every 24 hours  cefTRIAXone   IVPB      chlorhexidine 4% Liquid 1 Application(s) Topical <User Schedule>  clopidogrel Tablet 75 milliGRAM(s) Oral daily  heparin  Infusion.  Unit(s)/Hr (10 mL/Hr) IV Continuous <Continuous>  metoprolol tartrate 100 milliGRAM(s) Oral daily  midodrine 10 milliGRAM(s) Oral every 8 hours  norepinephrine Infusion 0.05 MICROgram(s)/kG/Min (8.08 mL/Hr) IV Continuous <Continuous>  pantoprazole  Injectable 40 milliGRAM(s) IV Push daily  potassium chloride  20 mEq/100 mL IVPB 20 milliEquivalent(s) IV Intermittent every 2 hours  potassium phosphate / sodium phosphate Powder (PHOS-NaK) 1 Packet(s) Oral three times a day before meals  sodium chloride 0.45%. 250 milliLiter(s) (60 mL/Hr) IV Continuous <Continuous>    MEDICATIONS  (PRN):  acetaminophen  Suppository .. 650 milliGRAM(s) Rectal every 8 hours PRN Temp greater or equal to 38C (100.4F)  LORazepam     Tablet 0.5 milliGRAM(s) Oral two times a day PRN Anxiety      HOME MEDICATIONS:  Home Medications:  albuterol 90 mcg/inh inhalation aerosol: 1 puff(s) inhaled every 4 hours, As needed, Shortness of Breath (09 Jan 2021 11:36)  amLODIPine 5 mg oral tablet: 1 tab(s) orally once a day (09 Jan 2021 12:40)  aspirin 81 mg oral tablet, chewable: 1 tab(s) orally once a day (09 Jan 2021 11:36)  atorvastatin 20 mg oral tablet: 1 tab(s) orally once a day (at bedtime) (09 Jan 2021 11:39)  budesonide-formoterol 160 mcg-4.5 mcg/inh inhalation aerosol: 2 puff(s) inhaled 2 times a day (29 Nov 2020 16:46)  clopidogrel 75 mg oral tablet: 1 tab(s) orally once a day (09 Jan 2021 11:36)  gabapentin 300 mg oral capsule: 1 cap(s) orally 3 times a day (09 Jan 2021 11:39)  lamoTRIgine 100 mg oral tablet: 1 tab(s) orally once a day (09 Jan 2021 11:39)  LORazepam 0.5 mg oral tablet: 1 tab(s) orally 2 times a day, As Needed (09 Jan 2021 13:00)  metoprolol tartrate 100 mg oral tablet: 1 tab(s) orally every 12 hours (09 Jan 2021 11:36)  montelukast 10 mg oral tablet: 1 tab(s) orally once a day (29 Nov 2020 16:46)  pantoprazole 40 mg oral delayed release tablet: 1 tab(s) orally once a day (before a meal) (09 Jan 2021 11:36)  QUEtiapine 200 mg oral tablet: 1 tab(s) orally once a day (at bedtime) (09 Jan 2021 11:39)  senna oral tablet: 2 tab(s) orally once a day (at bedtime) (09 Jan 2021 11:36)  tiotropium 18 mcg inhalation capsule: 1 cap(s) inhaled once a day (09 Jan 2021 11:36)      VITALS:   T(F): 99.9 (10-22 @ 07:10), Max: 102.8 (10-21 @ 14:21)  HR: 107 (10-22 @ 07:50) (73 - 188)  BP: 125/63 (10-22 @ 07:50) (75/51 - 146/84)  BP(mean): 84 (10-22 @ 07:50) (61 - 84)  RR: 39 (10-22 @ 07:50) (15 - 39)  SpO2: 93% (10-22 @ 07:50) (93% - 100%)    I&O's Summary    21 Oct 2021 07:01  -  22 Oct 2021 07:00  --------------------------------------------------------  IN: 1265 mL / OUT: 400 mL / NET: 865 mL        REVIEW OF SYSTEMS:  CONSTITUTIONAL: No weakness, fevers or chills  EYES: No visual changes  ENT: No vertigo or throat pain   NECK: No pain or stiffness  RESPIRATORY: No cough, wheezing, hemoptysis; No shortness of breath  CARDIOVASCULAR: No chest pain or palpitations  GASTROINTESTINAL: No abdominal or epigastric pain. No nausea, vomiting, or hematemesis; No diarrhea or constipation. No melena or hematochezia.  GENITOURINARY: No dysuria, frequency or hematuria  NEUROLOGICAL: No numbness or weakness  SKIN: No itching, no rashes  MSK: no    PHYSICAL EXAM:  NEURO: patient is awake , alert and oriented  GEN: Not in acute distress  NECK: no thyroid enlargement, no JVD  LUNGS: Clear to auscultation bilaterally   CARDIOVASCULAR: S1/S2 present, **Irregular rate and rhythm** , no murmurs or rubs, no carotid bruits,  + PP bilaterally  ABD: Soft, non-tender, non-distended, +BS  EXT: No DEVAN  SKIN: Intact    LABS:                        11.1   11.62 )-----------( 162      ( 22 Oct 2021 04:45 )             34.6     10-22    137  |  104  |  17  ----------------------------<  193<H>  3.0<L>   |  18  |  1.0    Ca    7.8<L>      22 Oct 2021 04:45  Phos  1.5     10-22  Mg     2.3     10-22    TPro  6.0  /  Alb  3.6  /  TBili  0.6  /  DBili  x   /  AST  19  /  ALT  8   /  AlkPhos  113  10-21    PT/INR - ( 21 Oct 2021 15:10 )   PT: 15.80 sec;   INR: 1.38 ratio         PTT - ( 21 Oct 2021 15:10 )  PTT:37.0 sec  Lactate, Blood: 1.4 mmol/L (10-22-21 @ 04:45)  Creatine Kinase, Serum: 152 U/L (10-22-21 @ 04:45)  Troponin T, Serum: 0.21 ng/mL *HH* (10-22-21 @ 04:45)  Troponin T, Serum: <0.01 ng/mL (10-21-21 @ 13:56)  Lactate, Blood: 1.3 mmol/L (10-21-21 @ 13:56)    CARDIAC MARKERS ( 22 Oct 2021 04:45 )  x     / 0.21 ng/mL / 152 U/L / x     / 14.5 ng/mL  CARDIAC MARKERS ( 21 Oct 2021 13:56 )  x     / <0.01 ng/mL / x     / x     / x          Serum Pro-Brain Natriuretic Peptide: 88798 pg/mL (10-22-21 @ 04:45)  Serum Pro-Brain Natriuretic Peptide: 5716 pg/mL (10-21-21 @ 13:56)          RADIOLOGY:  -CXR:  < from: Xray Chest 1 View- PORTABLE-Urgent (10.21.21 @ 15:07) >  Impression:    Bilateralperihilar opacities, left greater than right    < end of copied text >     HPI:  77 y/o PMHx COPD on home o2, A-Fib, CVA hx on aspirin and Plavix, Emphysema, HTN, s/p Carotid artheroplasty and Covid-19 in January, subsequently vaccinated, presented for the evaluation of SOB and fever Tmax 102 x 4 days and one day c/o Left calf pain. Pt denies hemoptysis or cough, but also endorsed non radiating midsternal chest pain/pressure at home and arrival in the hospital. On arrival in the ED was found to be on Afib with RVR , and received Cardizem push 20mg and IV infusion, and Amiodarone Bolus still in afib rate uncontrolled. Pt was given Lopressor 5mg IVP at bedside and rate now controlled. Currently Pt denies any chest pain/pressure os SOB and is onO2 via NC @2LPM.        PAST MEDICAL & SURGICAL HISTORY  Hypertension    Depression    COPD (chronic obstructive pulmonary disease)    Other cardiomyopathy    Other emphysema    PVD (peripheral vascular disease)    Allergic reaction    Bipolar 1 disorder    FLAVIO on CPAP    Transient ischemic attack    Congestive heart failure    Falls    2019 novel coronavirus disease (COVID-19)    Respiratory failure requiring intubation    Afib    History of cholecystectomy    H/O carotid angioplasty        FAMILY HISTORY:  FAMILY HISTORY:  No pertinent family history in first degree relatives        SOCIAL HISTORY:  []smoker  []Alcohol  []Drug    ALLERGIES:  codeine (Other (Moderate))  Depakote (Unknown)  Dilaudid (Short breath; Rash)  IV Contrast (Anaphylaxis)  losartan (Angioedema)  Risperdal (Other)  verapamil (Short breath; Angioedema)      MEDICATIONS:  MEDICATIONS  (STANDING):  albuterol/ipratropium for Nebulization 3 milliLiter(s) Nebulizer every 6 hours  aspirin  chewable 81 milliGRAM(s) Oral daily  atorvastatin 20 milliGRAM(s) Oral at bedtime  azithromycin  IVPB      azithromycin  IVPB 500 milliGRAM(s) IV Intermittent every 24 hours  cefTRIAXone   IVPB 1000 milliGRAM(s) IV Intermittent every 24 hours  cefTRIAXone   IVPB      chlorhexidine 4% Liquid 1 Application(s) Topical <User Schedule>  clopidogrel Tablet 75 milliGRAM(s) Oral daily  heparin  Infusion.  Unit(s)/Hr (10 mL/Hr) IV Continuous <Continuous>  metoprolol tartrate 100 milliGRAM(s) Oral daily  midodrine 10 milliGRAM(s) Oral every 8 hours  norepinephrine Infusion 0.05 MICROgram(s)/kG/Min (8.08 mL/Hr) IV Continuous <Continuous>  pantoprazole  Injectable 40 milliGRAM(s) IV Push daily  potassium chloride  20 mEq/100 mL IVPB 20 milliEquivalent(s) IV Intermittent every 2 hours  potassium phosphate / sodium phosphate Powder (PHOS-NaK) 1 Packet(s) Oral three times a day before meals  sodium chloride 0.45%. 250 milliLiter(s) (60 mL/Hr) IV Continuous <Continuous>    MEDICATIONS  (PRN):  acetaminophen  Suppository .. 650 milliGRAM(s) Rectal every 8 hours PRN Temp greater or equal to 38C (100.4F)  LORazepam     Tablet 0.5 milliGRAM(s) Oral two times a day PRN Anxiety      HOME MEDICATIONS:  Home Medications:  albuterol 90 mcg/inh inhalation aerosol: 1 puff(s) inhaled every 4 hours, As needed, Shortness of Breath (09 Jan 2021 11:36)  amLODIPine 5 mg oral tablet: 1 tab(s) orally once a day (09 Jan 2021 12:40)  aspirin 81 mg oral tablet, chewable: 1 tab(s) orally once a day (09 Jan 2021 11:36)  atorvastatin 20 mg oral tablet: 1 tab(s) orally once a day (at bedtime) (09 Jan 2021 11:39)  budesonide-formoterol 160 mcg-4.5 mcg/inh inhalation aerosol: 2 puff(s) inhaled 2 times a day (29 Nov 2020 16:46)  clopidogrel 75 mg oral tablet: 1 tab(s) orally once a day (09 Jan 2021 11:36)  gabapentin 300 mg oral capsule: 1 cap(s) orally 3 times a day (09 Jan 2021 11:39)  lamoTRIgine 100 mg oral tablet: 1 tab(s) orally once a day (09 Jan 2021 11:39)  LORazepam 0.5 mg oral tablet: 1 tab(s) orally 2 times a day, As Needed (09 Jan 2021 13:00)  metoprolol tartrate 100 mg oral tablet: 1 tab(s) orally every 12 hours (09 Jan 2021 11:36)  montelukast 10 mg oral tablet: 1 tab(s) orally once a day (29 Nov 2020 16:46)  pantoprazole 40 mg oral delayed release tablet: 1 tab(s) orally once a day (before a meal) (09 Jan 2021 11:36)  QUEtiapine 200 mg oral tablet: 1 tab(s) orally once a day (at bedtime) (09 Jan 2021 11:39)  senna oral tablet: 2 tab(s) orally once a day (at bedtime) (09 Jan 2021 11:36)  tiotropium 18 mcg inhalation capsule: 1 cap(s) inhaled once a day (09 Jan 2021 11:36)      VITALS:   T(F): 99.9 (10-22 @ 07:10), Max: 102.8 (10-21 @ 14:21)  HR: 107 (10-22 @ 07:50) (73 - 188)  BP: 125/63 (10-22 @ 07:50) (75/51 - 146/84)  BP(mean): 84 (10-22 @ 07:50) (61 - 84)  RR: 39 (10-22 @ 07:50) (15 - 39)  SpO2: 93% (10-22 @ 07:50) (93% - 100%)    I&O's Summary    21 Oct 2021 07:01  -  22 Oct 2021 07:00  --------------------------------------------------------  IN: 1265 mL / OUT: 400 mL / NET: 865 mL        REVIEW OF SYSTEMS:  CONSTITUTIONAL: No weakness, fevers or chills  EYES: No visual changes  ENT: No vertigo or throat pain   NECK: No pain or stiffness  RESPIRATORY: No cough, wheezing, hemoptysis; No shortness of breath  CARDIOVASCULAR: See above  GASTROINTESTINAL: No abdominal or epigastric pain. No nausea, vomiting, or hematemesis; No diarrhea or constipation. No melena or hematochezia.  GENITOURINARY: No dysuria, frequency or hematuria  NEUROLOGICAL: No numbness or weakness  SKIN: No itching, no rashes  MSK: no    PHYSICAL EXAM:  NEURO: patient is awake , alert and oriented  GEN: Not in acute distress  NECK: no thyroid enlargement, no JVD  LUNGS: Clear to auscultation bilaterally   CARDIOVASCULAR: S1/S2 present, **Irregular rate and rhythm** , no murmurs or rubs, no carotid bruits,  + PP bilaterally  ABD: Soft, non-tender, non-distended, +BS  EXT: No DEVAN  SKIN: Intact    LABS:                        11.1   11.62 )-----------( 162      ( 22 Oct 2021 04:45 )             34.6     10-22    137  |  104  |  17  ----------------------------<  193<H>  3.0<L>   |  18  |  1.0    Ca    7.8<L>      22 Oct 2021 04:45  Phos  1.5     10-22  Mg     2.3     10-22    TPro  6.0  /  Alb  3.6  /  TBili  0.6  /  DBili  x   /  AST  19  /  ALT  8   /  AlkPhos  113  10-21    PT/INR - ( 21 Oct 2021 15:10 )   PT: 15.80 sec;   INR: 1.38 ratio         PTT - ( 21 Oct 2021 15:10 )  PTT:37.0 sec  Lactate, Blood: 1.4 mmol/L (10-22-21 @ 04:45)  Creatine Kinase, Serum: 152 U/L (10-22-21 @ 04:45)  Troponin T, Serum: 0.21 ng/mL *HH* (10-22-21 @ 04:45)  Troponin T, Serum: <0.01 ng/mL (10-21-21 @ 13:56)  Lactate, Blood: 1.3 mmol/L (10-21-21 @ 13:56)    CARDIAC MARKERS ( 22 Oct 2021 04:45 )  x     / 0.21 ng/mL / 152 U/L / x     / 14.5 ng/mL  CARDIAC MARKERS ( 21 Oct 2021 13:56 )  x     / <0.01 ng/mL / x     / x     / x          Serum Pro-Brain Natriuretic Peptide: 23938 pg/mL (10-22-21 @ 04:45)  Serum Pro-Brain Natriuretic Peptide: 5716 pg/mL (10-21-21 @ 13:56)          RADIOLOGY:  -CXR:  < from: Xray Chest 1 View- PORTABLE-Urgent (10.21.21 @ 15:07) >  Impression:    Bilateralperihilar opacities, left greater than right    < end of copied text >

## 2021-10-22 NOTE — CHART NOTE - NSCHARTNOTEFT_GEN_A_CORE
Medicine [facility] [id number]      PA was called by RN to start an IV, due to poor venous access secondary to unknown vascular abnormality.    The ultrasound was used to identify a suitable vein for venipuncture. The site was cleansed with chlorhexidine and the right/left  left basilic vein was punctured with ultrasound guidance. The catheter was advanced without resistance, demonstrated good blood return and flushed easily. The IV was secured to the patient using semi permeable occlusive dressing and tape.    The patient tolerated the procedure well.  RN advised to monitor site and manage according to protocol.

## 2021-10-22 NOTE — PROGRESS NOTE ADULT - ASSESSMENT
77 y/o PMHx COPD on home o2, A-Fib, CVA hx on aspirin and Plavix, Emphysema, HTN, s/p Carotid artheroplasty and Covid-19 in January, subsequently vaccinated.  Pt presented to the ED today with SOB and fever Tmax 102 x 4 days and one day c/o Left calf pain.   Sepsis / Acute UTI / Afib w/ RVR    # Sepsis likely secondary to UTI   - Patient septic on admission: elevated WBC 13.73, tachycardic, tachypneic, and febrile @ 100.8  - UA positive for nitrite, leukocyte esterase, WBC, protein, blood, and bacteria   - IV Rocephin and Azithro started in ED  - ID consult appreciated  - Urine and blood cultures, urine legionella and strep sent   - Procal sent   - Per ID, continue ceftriaxone 1g daily and 500 mg azithromycin daily       # Atrial Fibrillation with RVR - Rate controlled   - S/P Cardizem, Amiodarone and Lopressor 5 IVP   - Conversion in ED  - Cardiology consult appreciated   - Continue with telemetry monitoring  - Continue with metoprolol 50mg BID for rate control    # NSTEMI   - ST depressions on ECG   - Trops <0.01 --> 0.21, CKMB elevated 14.5  - Cardiology consult appreciated   - Cardiac cath on Monday, 10/25, keep NPO after midnight on 10/24  - Serial ECG  - Trend cardiac enzymes  - On Heparin IV gtt - monitor aPTT  - F/u ECHO    # COPD exacerbation.   - On azithromycin  - Duonebs q4hr and PRN  - Keep SpO2>92%    # Leg pain  - Therapeutic lovenox given in am, 10/22  - LE doppler negative for DVT     # Hypokalemia   - K 3.0  - Will replete     # Hypophosphatemia   - Phos 1.5  - Will replete     # Hypocalcemia   - Ca 7.8  - Will replete     Status: Full Code  DVT Prophylaxis: Hep SubQ  GI Prophylaxis: Pantoprazole   Dispo: CCU

## 2021-10-22 NOTE — CONSULT NOTE ADULT - NSCONSULTADDITIONALINFOA_GEN_ALL_CORE
77 y/o PMHx COPD on home o2, A-Fib, CVA hx on aspirin and Plavix, Emphysema, HTN, s/p Carotid arterioplasty and Covid-19 in January, subsequently vaccinated, presented for the evaluation of SOB and fever Tmax 102 x 4 days and one day c/o Left calf pain. Also endorsed non radiating midsternal pain/pressure. Was found to be in Afib with RVR    Impression:  #Afib with RVR  #NSTEMI  #Hypokalemia  #COPD/Emphysema    Plan  -c/w telemetry monitoring in CCU  -Pt currently rate controlled. C/w Metoprolol, change to 50mg x2 day  -Troponin trending up <0.01-->0.21. Continue to trend  -CK-MB elevated 14.5  -Pt received therapeutic Lovenox this morning. Cardiac cath on Monday am. Keep NPO after midnight on 10/24  -Pt allergic to IV contrast. Prednisone 40mg PO daily x2 days starting tomorrow for pre-cath  -Start Heparin IV gtt. Serial aPTT   -K;3.0-Repeat lytes and replete as needed   -Serial EKG to assess for resolution of ST changes  -Monitor vital signs to monitor hemodynamic stability  -Low fat DASH diet  -ECHO: 2D ECHO to evaluate LV function and wall motion abnormalities    Discussed with Dr Bishop

## 2021-10-22 NOTE — CONSULT NOTE ADULT - SUBJECTIVE AND OBJECTIVE BOX
Patient is a 76y old  Female who presents with a chief complaint of Difficulty Breathing (22 Oct 2021 08:55)      HPI:  77 y/o PMHx COPD on home o2, A-Fib, CVA hx on aspirin and Plavix, Emphysema, HTN, s/p Carotid artheroplasty and Covid-19 in January, subsequently vaccinated.  Pt presented to the ED today with SOB and fever Tmax 102 x 4 days and one day c/o Left calf pain.   Pt denies hemoptysis or cough, but also complains of mild chest discomfort.  At present time patient is on BiPaP, s/p Cardizem push 20mg and IV infusion, and Amidarone Bolus still in afib rate uncontrolled.  Pt was given Lopressor 5mg IVP at bedside and rate now controlled in 70's.   Pt admitted to relief of chest discomfort.  Cardiologist:  Dr Bishop (21 Oct 2021 21:20)  off pressors now     PAST MEDICAL & SURGICAL HISTORY:  Hypertension    Depression    COPD (chronic obstructive pulmonary disease)    Other cardiomyopathy    Other emphysema    PVD (peripheral vascular disease)    Allergic reaction    Bipolar 1 disorder    FLAVIO on CPAP    Transient ischemic attack    Congestive heart failure    Falls    2019 novel coronavirus disease (COVID-19)    Respiratory failure requiring intubation    Afib    History of cholecystectomy    H/O carotid angioplasty      Allergies    codeine (Other (Moderate))  Depakote (Unknown)  Dilaudid (Short breath; Rash)  IV Contrast (Anaphylaxis)  losartan (Angioedema)  Risperdal (Other)  verapamil (Short breath; Angioedema)    Intolerances      Family history : no cardiovscular family history   Home Medications:  albuterol 90 mcg/inh inhalation aerosol: 1 puff(s) inhaled every 4 hours, As needed, Shortness of Breath (2021 11:36)  amLODIPine 5 mg oral tablet: 1 tab(s) orally once a day (2021 12:40)  aspirin 81 mg oral tablet, chewable: 1 tab(s) orally once a day (2021 11:36)  atorvastatin 20 mg oral tablet: 1 tab(s) orally once a day (at bedtime) (2021 11:39)  budesonide-formoterol 160 mcg-4.5 mcg/inh inhalation aerosol: 2 puff(s) inhaled 2 times a day (2020 16:46)  clopidogrel 75 mg oral tablet: 1 tab(s) orally once a day (2021 11:36)  gabapentin 300 mg oral capsule: 1 cap(s) orally 3 times a day (2021 11:39)  lamoTRIgine 100 mg oral tablet: 1 tab(s) orally once a day (2021 11:39)  LORazepam 0.5 mg oral tablet: 1 tab(s) orally 2 times a day, As Needed (2021 13:00)  metoprolol tartrate 100 mg oral tablet: 1 tab(s) orally every 12 hours (2021 11:36)  montelukast 10 mg oral tablet: 1 tab(s) orally once a day (2020 16:46)  pantoprazole 40 mg oral delayed release tablet: 1 tab(s) orally once a day (before a meal) (2021 11:36)  QUEtiapine 200 mg oral tablet: 1 tab(s) orally once a day (at bedtime) (2021 11:39)  senna oral tablet: 2 tab(s) orally once a day (at bedtime) (2021 11:36)  tiotropium 18 mcg inhalation capsule: 1 cap(s) inhaled once a day (2021 11:36)    Occupation:  Alochol: Denied  Smoking: Denied  Drug Use: Denied  Marital Status:         ROS: as in HPI; All other systems reviewed are negative    ICU Vital Signs Last 24 Hrs  T(C): 37.7 (22 Oct 2021 07:10), Max: 39.3 (21 Oct 2021 14:21)  T(F): 99.9 (22 Oct 2021 07:10), Max: 102.8 (21 Oct 2021 14:21)  HR: 107 (22 Oct 2021 07:50) (73 - 188)  BP: 125/63 (22 Oct 2021 07:50) (75/51 - 146/84)  BP(mean): 84 (22 Oct 2021 07:50) (61 - 84)  ABP: --  ABP(mean): --  RR: 24 (22 Oct 2021 09:03) (15 - 39)  SpO2: 93% (22 Oct 2021 07:50) (93% - 100%)        Physical Examination:    General: No acute distress.  Alert, oriented, interactive, nonfocal    HEENT: Pupils equal, reactive to light.  Symmetric.    PULM: Clear to auscultation bilaterally, no significant sputum production    CVS: Regular rate and rhythm, no murmurs, rubs, or gallops    ABD: Soft, nondistended, nontender, normoactive bowel sounds, no masses    EXT: No edema, nontender, no clubbing     SKIN: Warm and well perfused, no rashes noted.    Neurology : no motor or sensory deficit     Musculoskeletal : move all extremity     Lymphatic system: no Palpable node           ABG - ( 21 Oct 2021 21:32 )  pH, Arterial: 7.35  pH, Blood: x     /  pCO2: 36    /  pO2: 142   / HCO3: 20    / Base Excess: -5.1  /  SaO2: 100.0               I&O's Detail    21 Oct 2021 07:01  -  22 Oct 2021 07:00  --------------------------------------------------------  IN:    Diltiazem: 125 mL    IV PiggyBack: 300 mL    Norepinephrine: 90 mL    Sodium Chloride 0.9% Bolus: 750 mL  Total IN: 1265 mL    OUT:    Voided (mL): 400 mL  Total OUT: 400 mL    Total NET: 865 mL            LABS:                        11.1   11.62 )-----------( 162      ( 22 Oct 2021 04:45 )             34.6     22 Oct 2021 04:45    137    |  104    |  17     ----------------------------<  193    3.0     |  18     |  1.0      Ca    7.8        22 Oct 2021 04:45  Phos  1.5       22 Oct 2021 04:45  Mg     2.3       22 Oct 2021 04:45    TPro  6.0    /  Alb  3.6    /  TBili  0.6    /  DBili  x      /  AST  19     /  ALT  8      /  AlkPhos  113    21 Oct 2021 13:56  Amylase x     lipase x          CARDIAC MARKERS ( 22 Oct 2021 04:45 )  x     / 0.21 ng/mL / 152 U/L / x     / 14.5 ng/mL  CARDIAC MARKERS ( 21 Oct 2021 13:56 )  x     / <0.01 ng/mL / x     / x     / x          CAPILLARY BLOOD GLUCOSE        PT/INR - ( 21 Oct 2021 15:10 )   PT: 15.80 sec;   INR: 1.38 ratio         PTT - ( 21 Oct 2021 15:10 )  PTT:37.0 sec  Urinalysis Basic - ( 21 Oct 2021 14:44 )    Color: Yellow / Appearance: Slightly Cloudy / S.020 / pH: x  Gluc: x / Ketone: Negative  / Bili: Negative / Urobili: 0.2 mg/dL   Blood: x / Protein: 30 mg/dL / Nitrite: Positive   Leuk Esterase: Large / RBC: 3-5 /HPF / WBC >50 /HPF   Sq Epi: x / Non Sq Epi: Few /HPF / Bacteria: Moderate      Culture    Lactate, Blood: 1.4 mmol/L (10-22-21 @ 04:45)  Lactate, Blood: 1.3 mmol/L (10-21-21 @ 13:56)      MEDICATIONS  (STANDING):  albuterol/ipratropium for Nebulization 3 milliLiter(s) Nebulizer every 6 hours  aspirin  chewable 81 milliGRAM(s) Oral daily  atorvastatin 20 milliGRAM(s) Oral at bedtime  azithromycin  IVPB      azithromycin  IVPB 500 milliGRAM(s) IV Intermittent every 24 hours  cefTRIAXone   IVPB 1000 milliGRAM(s) IV Intermittent every 24 hours  cefTRIAXone   IVPB      chlorhexidine 4% Liquid 1 Application(s) Topical <User Schedule>  clopidogrel Tablet 75 milliGRAM(s) Oral daily  heparin  Infusion.  Unit(s)/Hr (10 mL/Hr) IV Continuous <Continuous>  metoprolol tartrate 100 milliGRAM(s) Oral daily  midodrine 10 milliGRAM(s) Oral every 8 hours  norepinephrine Infusion 0.05 MICROgram(s)/kG/Min (8.08 mL/Hr) IV Continuous <Continuous>  pantoprazole  Injectable 40 milliGRAM(s) IV Push daily  potassium chloride  20 mEq/100 mL IVPB 20 milliEquivalent(s) IV Intermittent every 2 hours  potassium phosphate / sodium phosphate Powder (PHOS-NaK) 1 Packet(s) Oral three times a day before meals  sodium chloride 0.45%. 250 milliLiter(s) (60 mL/Hr) IV Continuous <Continuous>    MEDICATIONS  (PRN):  acetaminophen  Suppository .. 650 milliGRAM(s) Rectal every 8 hours PRN Temp greater or equal to 38C (100.4F)  LORazepam     Tablet 0.5 milliGRAM(s) Oral two times a day PRN Anxiety        RADIOLOGY: ***     CXR: b/l opacity   TLC:  OG:  ET tube:        [facility] ICU:  ECHO:

## 2021-10-22 NOTE — PROGRESS NOTE ADULT - SUBJECTIVE AND OBJECTIVE BOX
SUBJECTIVE:    Patient is a 76y old Female who presents with a chief complaint of Difficulty Breathing (22 Oct 2021 09:37)    Currently admitted to medicine with the primary diagnosis of Sepsis    Today is hospital day 1d. This morning she is resting comfortably in bed and reports no new issues or overnight events.     PAST MEDICAL & SURGICAL HISTORY  Hypertension    Depression    COPD (chronic obstructive pulmonary disease)    Other cardiomyopathy    Other emphysema    PVD (peripheral vascular disease)    Allergic reaction    Bipolar 1 disorder    FLAVIO on CPAP    Transient ischemic attack    Congestive heart failure    Falls    2019 novel coronavirus disease (COVID-19)    Respiratory failure requiring intubation    Afib    History of cholecystectomy    H/O carotid angioplasty      SOCIAL HISTORY:  Negative for smoking/alcohol/drug use.     ALLERGIES:  codeine (Other (Moderate))  Depakote (Unknown)  Dilaudid (Short breath; Rash)  IV Contrast (Anaphylaxis)  losartan (Angioedema)  Risperdal (Other)  verapamil (Short breath; Angioedema)    MEDICATIONS:  STANDING MEDICATIONS  albuterol/ipratropium for Nebulization 3 milliLiter(s) Nebulizer every 6 hours  aspirin  chewable 81 milliGRAM(s) Oral daily  atorvastatin 20 milliGRAM(s) Oral at bedtime  azithromycin  IVPB      azithromycin  IVPB 500 milliGRAM(s) IV Intermittent every 24 hours  cefTRIAXone   IVPB 1000 milliGRAM(s) IV Intermittent every 24 hours  cefTRIAXone   IVPB      chlorhexidine 4% Liquid 1 Application(s) Topical <User Schedule>  clopidogrel Tablet 75 milliGRAM(s) Oral daily  heparin  Infusion.  Unit(s)/Hr IV Continuous <Continuous>  metoprolol tartrate 50 milliGRAM(s) Oral two times a day  midodrine 10 milliGRAM(s) Oral every 8 hours  norepinephrine Infusion 0.05 MICROgram(s)/kG/Min IV Continuous <Continuous>  pantoprazole  Injectable 40 milliGRAM(s) IV Push daily  potassium chloride  20 mEq/100 mL IVPB 20 milliEquivalent(s) IV Intermittent every 2 hours  potassium phosphate / sodium phosphate Powder (PHOS-NaK) 1 Packet(s) Oral three times a day before meals  sodium chloride 0.45%. 250 milliLiter(s) IV Continuous <Continuous>    PRN MEDICATIONS  acetaminophen  Suppository .. 650 milliGRAM(s) Rectal every 8 hours PRN  LORazepam     Tablet 0.5 milliGRAM(s) Oral two times a day PRN    VITALS:   T(F): 99.9  HR: 101  BP: 107/54  RR: 27  SpO2: 96%    LABS:                        11.1   11.62 )-----------( 162      ( 22 Oct 2021 04:45 )             34.6     10-22    137  |  104  |  17  ----------------------------<  193<H>  3.0<L>   |  18  |  1.0    Ca    7.8<L>      22 Oct 2021 04:45  Phos  1.5     10-22  Mg     2.3     10-22    TPro  6.0  /  Alb  3.6  /  TBili  0.6  /  DBili  x   /  AST  19  /  ALT  8   /  AlkPhos  113  10-21    PT/INR - ( 21 Oct 2021 15:10 )   PT: 15.80 sec;   INR: 1.38 ratio         PTT - ( 21 Oct 2021 15:10 )  PTT:37.0 sec  Urinalysis Basic - ( 21 Oct 2021 14:44 )    Color: Yellow / Appearance: Slightly Cloudy / S.020 / pH: x  Gluc: x / Ketone: Negative  / Bili: Negative / Urobili: 0.2 mg/dL   Blood: x / Protein: 30 mg/dL / Nitrite: Positive   Leuk Esterase: Large / RBC: 3-5 /HPF / WBC >50 /HPF   Sq Epi: x / Non Sq Epi: Few /HPF / Bacteria: Moderate      ABG - ( 21 Oct 2021 21:32 )  pH, Arterial: 7.35  pH, Blood: x     /  pCO2: 36    /  pO2: 142   / HCO3: 20    / Base Excess: -5.1  /  SaO2: 100.0             Lactate, Blood: 1.4 mmol/L (10-22-21 @ 04:45)  Creatine Kinase, Serum: 152 U/L (10-22-21 @ 04:45)  Troponin T, Serum: 0.21 ng/mL *HH* (10-22-21 @ 04:45)  Troponin T, Serum: <0.01 ng/mL (10-21-21 @ 13:56)  Lactate, Blood: 1.3 mmol/L (10-21-21 @ 13:56)      CARDIAC MARKERS ( 22 Oct 2021 04:45 )  x     / 0.21 ng/mL / 152 U/L / x     / 14.5 ng/mL  CARDIAC MARKERS ( 21 Oct 2021 13:56 )  x     / <0.01 ng/mL / x     / x     / x          RADIOLOGY:  < from: [facility] Duplex Lower Ext Vein Scan, Bilat (10.21.21 @ 22:59) >  IMPRESSION:  No evidence of deep venous thrombosis in either lower extremity.    < end of copied text >  < from: Xray Chest 1 View-PORTABLE IMMEDIATE (Xray Chest 1 View-PORTABLE IMMEDIATE .) (10.21.21 @ 19:23) >  Impression:    Unchanged patchy bilateral opacities, left greater than right.    < end of copied text >  < from: 12 Lead ECG (10.21.21 @ 18:23) >  Diagnosis Line Atrial fibrillation with rapid ventricular response  Marked ST abnormality, possible inferior subendocardial injury  Marked ST abnormality, possible anterolateral subendocardial injury  Abnormal ECG    < end of copied text >      PHYSICAL EXAM:  GENERAL: Patient is not in acute distress, but tremoring  HEAD:  Atraumatic, Normocephalic  EYES: EOMI, PERRLA, conjunctiva and sclera clear  ENMT: No tonsillar erythema, exudates, or enlargement; Moist mucous membranes, No lesions  NERVOUS SYSTEM:  Alert & Oriented X3, Good concentration; Motor Strength 5/5 B/L upper and lower extremities  CHEST/LUNG: Clear to auscultation bilaterally; No rales, rhonchi, wheezing, or rubs  HEART: Regular rate and rhythm; No murmurs, rubs, or gallops  ABDOMEN: Soft, Nontender, Nondistended; Bowel sounds present in all 4 quadrants    IV Access: YES  NG tube present: NO  PEG tube present: NO  Freeman catheter present: NO

## 2021-10-22 NOTE — CONSULT NOTE ADULT - SUBJECTIVE AND OBJECTIVE BOX
SHAUN COATES  76y, Female  Allergy: codeine (Other (Moderate))  Depakote (Unknown)  Dilaudid (Short breath; Rash)  IV Contrast (Anaphylaxis)  losartan (Angioedema)  Risperdal (Other)  verapamil (Short breath; Angioedema)      CHIEF COMPLAINT: Difficulty Breathing (21 Oct 2021 21:20)      LOS  1d    HPI:  75 y/o PMHx COPD on home o2, A-Fib, CVA hx on aspirin and Plavix, Emphysema, HTN, s/p Carotid artheroplasty and Covid-19 in January, subsequently vaccinated.  Pt presented to the ED today with SOB and fever Tmax 102 x 4 days and one day c/o Left calf pain.  Pt denies hemoptysis or cough, but also complains of mild chest discomfort.  At present time patient is on BiPaP, s/p Cardizem push 20mg and IV infusion, and Amidarone Bolus still in afib rate uncontrolled.  Pt was given Lopressor 5mg IVP at bedside and rate now controlled in 70's.  Pt admitted to relief of chest discomfort.  Pulmonologist : Dr Vickers  Cardiologist:  Dr Bishop (21 Oct 2021 21:20)      INFECTIOUS DISEASE HISTORY:  History as above.  ID consulted for sepsis.  Presents with 3-4 day history of shortness of breath, associated with shortness of breath.   Denies any coughing.  Found to be in A fib with RVR.         PAST MEDICAL & SURGICAL HISTORY:  Hypertension    Depression    COPD (chronic obstructive pulmonary disease)    Other cardiomyopathy    Other emphysema    PVD (peripheral vascular disease)    Allergic reaction    Bipolar 1 disorder    FLAVIO on CPAP    Transient ischemic attack    Congestive heart failure    Falls    2019 novel coronavirus disease (COVID-19)    Respiratory failure requiring intubation    Afib    History of cholecystectomy    H/O carotid angioplasty        FAMILY HISTORY  No pertinent family history in first degree relatives        SOCIAL HISTORY  Social History:  Tobacco use: quit 25 yrs ago, 2pk per day  EtOH use: denies  Illicit drug use: denies  Marital Status: lives at home with  (21 Oct 2021 21:20)        ROS  General: Denies rigors, nightsweats  HEENT: Denies headache, rhinorrhea, sore throat, eye pain  CV: Denies CP, palpitations  PULM: Denies wheezing, hemoptysis  GI: Denies hematemesis, hematochezia, melena  : Denies discharge, hematuria  MSK: Denies arthralgias, myalgias  SKIN: Denies rash, lesions  NEURO: Denies paresthesias, weakness  PSYCH: Denies depression, anxiety    VITALS:  T(F): 96.2, Max: 102.8 (10-21-21 @ 14:21)  HR: 87  BP: 108/56  RR: 19Vital Signs Last 24 Hrs  T(C): 35.7 (22 Oct 2021 00:30), Max: 39.3 (21 Oct 2021 14:21)  T(F): 96.2 (22 Oct 2021 00:30), Max: 102.8 (21 Oct 2021 14:21)  HR: 87 (22 Oct 2021 06:00) (73 - 188)  BP: 108/56 (22 Oct 2021 06:00) (75/51 - 146/84)  BP(mean): 80 (22 Oct 2021 06:00) (61 - 80)  RR: 19 (22 Oct 2021 06:00) (15 - 30)  SpO2: 97% (22 Oct 2021 06:00) (97% - 100%)    PHYSICAL EXAM:  Gen: NAD, resting in bed  HEENT: Normocephalic, atraumatic  Neck: supple, no lymphadenopathy  CV: Regular rate & regular rhythm  Lungs: decreased BS at bases, no fremitus  Abdomen: Soft, BS present  Ext: Warm, well perfused  Neuro: non focal, awake  Skin: no rash, no erythema  Lines: no phlebitis    TESTS & MEASUREMENTS:                        11.1  11.62 )-----------( 162      ( 22 Oct 2021 04:45 )             34.6    10-22    137  |  104  |  17  ----------------------------<  193<H>  3.0<L>   |  18  |  1.0    Ca    7.8<L>      22 Oct 2021 04:45  Phos  1.5     10-22  Mg     2.3     10-22    TPro  6.0  /  Alb  3.6  /  TBili  0.6  /  DBili  x   /  AST  19  /  ALT  8   /  AlkPhos  113  10-21    eGFR if Non African American: 55 mL/min/1.73M2 (10-22-21 @ 04:45)  eGFR if African American: 64 mL/min/1.73M2 (10-22-21 @ 04:45)  eGFR if Non African American: 44 mL/min/1.73M2 (10-21-21 @ 13:56)  eGFR if African American: 52 mL/min/1.73M2 (10-21-21 @ 13:56)    LIVER FUNCTIONS - ( 21 Oct 2021 13:56 )  Alb: 3.6 g/dL / Pro: 6.0 g/dL / ALK PHOS: 113 U/L / ALT: 8 U/L / AST: 19 U/L / GGT: x          Urinalysis Basic - ( 21 Oct 2021 14:44 )    Color: Yellow / Appearance: Slightly Cloudy / S.020 / pH: x  Gluc: x / Ketone: Negative  / Bili: Negative / Urobili: 0.2 mg/dL  Blood: x / Protein: 30 mg/dL / Nitrite: Positive  Leuk Esterase: Large / RBC: 3-5 /HPF / WBC >50 /HPF  Sq Epi: x / Non Sq Epi: Few /HPF / Bacteria: Moderate        Culture - Blood (collected 21 @ 07:24)  Source: .Blood None  Final Report (21 @ 12:00):    No Growth Final    Culture - Blood (collected 20 @ 11:42)  Source: .Blood None  Final Report (21 @ 22:01):    No Growth Final    Culture - Sputum (collected 20 @ 12:00)  Source: .Sputum Deep Tracheal Aspirate  Gram Stain (20 @ 12:46):    Few polymorphonuclear leukocytes per low power field    Few Squamous epithelial cells per low power field    Few Gram Positive Cocci in Pairs and Chains per oil power field  Final Report (21 @ 11:26):    Normal Respiratory Beronica present    Culture - Blood (collected 20 @ 16:45)  Source: .Blood None  Final Report (21 @ 01:00):    No Growth Final    Culture - Urine (collected 20 @ 15:21)  Source: .Urine Clean Catch (Midstream)  Final Report (20 @ 20:53):    No growth    Culture - Blood (collected 20 @ 11:27)  Source: .Blood None  Final Report (20 @ 16:01):    No Growth Final    Culture - Blood (collected 20 @ 22:53)  Source: .Blood Blood-Venous  Final Report (20 @ 13:01):    No Growth Final    Culture - Urine (collected 20 @ 00:07)  Source: .Urine Clean Catch (Midstream)  Final Report (20 @ 02:06):    <10,000 CFU/mL Normal Urogenital Beronica    Culture - Blood (collected 20 @ 21:53)  Source: .Blood Blood-Peripheral  Gram Stain (20 @ 03:50):    Growth in aerobic bottle: Gram Positive Cocci in Clusters  Final Report (20 @ 09:40):    Growth in anaerobic bottle: Staphylococcus capitis    Coag Negative Staphylococcus    Single set isolate, possible contaminant. Contact    Microbiology if susceptibility testing clinically    indicated.    ***Blood Panel PCR results on this specimen are available    approximately 3 hours after the Gram stain result.***    Gram stain, PCR, and/or culture results may not always    correspond due to difference in methodologies.    ************************************************************    This PCR assay was performed using [employer].    The following targets are tested for: Enterococcus,    vancomycin resistant enterococci, Listeria monocytogenes,    coagulase negative staphylococci, S. aureus,    methicillin resistant S. aureus, Streptococcus agalactiae    (Group B), S. pneumoniae, S. pyogenes (Group A),    Acinetobacter baumannii, Enterobacter cloacae, E. coli,    Klebsiella oxytoca, K. pneumoniae, Proteus sp.,    Serratia marcescens, Haemophilus influenzae,    Neisseria meningitidis, Pseudomonas aeruginosa, Candida    albicans, C. glabrata, C krusei, C parapsilosis,    C. tropicalis and the KPC resistance gene.    "Due to technical problems, Proteus sp. and Pseudomonas    aeruginosa will Not be reported as part of the BCID panel    until further notice".  Organism: Blood Culture PCR (20 @ 09:40)  Organism: Blood Culture PCR (20 @ 09:40)      -  Coagulase negative Staphylococcus: Detec      Method Type: PCR    Culture - Blood (collected 20 @ 21:53)  Source: .Blood Blood-Peripheral  Final Report (20 @ 07:00):    No Growth Final    Culture - Blood (collected 20 @ 11:28)  Source: .Blood Blood-Peripheral  Final Report (20 @ 23:01):    No Growth Final        Lactate, Blood: 1.4 mmol/L (10-22-21 @ 04:45)  Lactate, Blood: 1.3 mmol/L (10-21-21 @ 13:56)      INFECTIOUS DISEASES TESTING  COVID-19 PCR: NotDetec (10-21-21 @ 13:30)  COVID-19 PCR: NotDetec (21 @ 16:50)  Procalcitonin, Serum: 6.12 ng/mL (20 @ 04:30)  Procalcitonin, Serum: 0.31 ng/mL (20 @ 21:31)  Procalcitonin, Serum: 0.33 ng/mL (20 @ 16:00)  MRSA PCR Result.: Negative (20 @ 14:33)  Procalcitonin, Serum: 1.09 ng/mL (20 @ 06:34)  Procalcitonin, Serum: 0.31 ng/mL (20 @ 22:52)  COVID-19 PCR: Detected (20 @ 04:50)  Procalcitonin, Serum: 0.84 ng/mL (20 @ 21:56)  Procalcitonin, Serum: 0.10 ng/mL (20 @ 08:14)  COVID-19 PCR: Detected (20 @ 12:30)  Procalcitonin, Serum: 0.09 ng/mL (20 @ 10:05)  Procalcitonin, Serum: 0.09 ng/mL (20 @ 21:30)  COVID-19 PCR: NotDetec (20 @ 13:30)  Procalcitonin, Serum: 0.10 ng/mL (20 @ 13:15)      RADIOLOGY & ADDITIONAL TESTS:  I have personally reviewed the last Chest xray  CXR  Xray Chest 1 View- PORTABLE-Urgent:  EXAM:  XR CHEST PORTABLE URGENT 1V            PROCEDURE DATE:  10/21/2021            INTERPRETATION:  Clinical History / Reason for exam: Chest pain    Comparison : Chest radiograph 2021.    Technique/Positioning: Frontal, portable, low lung volumes.    Findings:    Support devices: Telemetry leads overlie patient    Cardiac/mediastinum/hilum: Stable    Lung parenchyma/Pleura: Bilateral perihilar opacities, left greater than right    Skeleton/soft tissues: Stable    Impression:    Bilateralperihilar opacities, left greater than right        --- End of Report ---              KARMEN MURPHY MD; Attending Radiologist  This document has been electronically signed. Oct 21 2021  4:17PM (10-21-21 @ 15:07)      CT      CARDIOLOGY TESTING  12 Lead ECG:  Ventricular Rate 144 BPM    Atrial Rate 192 BPM    QRS Duration 94 ms    Q-T Interval 270 ms    QTC Calculation(Bazett) 418 ms    R Axis 39 degrees    T Axis 231 degrees    Diagnosis Line Atrial fibrillation with rapid ventricular response  Marked ST abnormality, possible inferior subendocardial injury  Marked ST abnormality, possible anterolateral subendocardial injury  Abnormal ECG  Confirmed by VIVIENNE BISHOP MD ([id number]) on 10/21/2021 7:10:15 PM (10-21-21 @ 18:23)  12 Lead ECG:  Ventricular Rate 148 BPM    Atrial Rate 153 BPM    QRS Duration 94 ms    Q-T Interval 262 ms    QTC Calculation(Bazett) 411 ms    P Axis 217 degrees    R Axis 39 degrees    T Axis 230 degrees    Diagnosis Line Unusual P axis, possible ectopic atrial tachycardia with undetermined rhythm  irregularity  Marked ST abnormality, possible inferior subendocardial injury  Marked ST abnormality, possible anterolateral subendocardial injury  Abnormal ECG    Confirmed by VIVIENNE BISHOP MD [id number]) on 10/21/2021 7:09:20 PM (10-21-21 @ 18:22)      MEDICATIONS  albuterol/ipratropium for Nebulization 3 Nebulizer every 6 hours  aspirin  chewable 81 Oral daily  atorvastatin 20 Oral at bedtime  azithromycin  IVPB    azithromycin  IVPB 500 IV Intermittent every 24 hours  cefTRIAXone   IVPB 1000 IV Intermittent every 24 hours  cefTRIAXone   IVPB    chlorhexidine 4% Liquid 1 Topical <User Schedule>  clopidogrel Tablet 75 Oral daily  enoxaparin Injectable 80 SubCutaneous every 12 hours  metoprolol tartrate 100 Oral daily  midodrine 10 Oral every 8 hours  norepinephrine Infusion 0.05 IV Continuous <Continuous>  pantoprazole  Injectable 40 IV Push daily  sodium chloride 0.45%. 250 IV Continuous <Continuous>      Weight  Weight (kg): 91.2 (10-22-21 @ 00:03)    ANTIBIOTICS:  azithromycin  IVPB      azithromycin  IVPB 500 milliGRAM(s) IV Intermittent every 24 hours  cefTRIAXone   IVPB 1000 milliGRAM(s) IV Intermittent every 24 hours  cefTRIAXone   IVPB          ALLERGIES:  codeine (Other (Moderate))  Depakote (Unknown)  Dilaudid (Short breath; Rash)  IV Contrast (Anaphylaxis)  losartan (Angioedema)  Risperdal (Other)  verapamil (Short breath; Angioedema)

## 2021-10-22 NOTE — PROGRESS NOTE ADULT - ASSESSMENT
# Sepsis likely secondary to suspected GNR PNA/UTI  - Patient septic on admission: elevated WBC 13.73, tachycardic, tachypneic, and febrile @ 100.8  - UA positive for nitrite, leukocyte esterase, WBC, protein, blood, and bacteria   - IV Rocephin and Azithro started in ED  - ID consult appreciated  - Urine and blood cultures, urine legionella and strep sent   - Procal sent   - Per ID, continue ceftriaxone 1g daily and 500 mg azithromycin daily       # Chronic Atrial Fibrillation with RVR - Rate controlled   - S/P Cardizem, Amiodarone and Lopressor 5 IVP   - Conversion in ED  - Cardiology consult appreciated   - Continue with telemetry monitoring  - Continue with metoprolol 50mg BID for rate control    # NSTEMI   - ST depressions on ECG   - Trops <0.01 --> 0.21, CKMB elevated 14.5  - Cardiology consult appreciated   - Cardiac cath on Monday, 10/25, keep NPO after midnight on 10/24  - Serial ECG  - Trend cardiac enzymes  - On Heparin IV gtt started today - monitor aPTT  - F/u ECHO    #acute on chronic diastolic CHF  - IV lasix x1, I<O, fluid restriction     # COPD exacerbation.   - On azithromycin  - Duonebs q4hr and PRN  - Keep SpO2>92%    # Leg pain  - Therapeutic lovenox given in am, 10/22 - Dc'ed   - LE doppler negative for DVT     # Hypokalemia   - K 3.0  - repleted        npo after mn on sunday, needs routine covid swab on sunday for cath monday   # Hypophosphatemia   - Phos 1.5  - repleted  # Sepsis likely secondary to suspected GNR PNA/UTI  - Patient septic on admission: elevated WBC 13.73, tachycardic, tachypneic, and febrile @ 100.8  - UA positive for nitrite, leukocyte esterase, WBC, protein, blood, and bacteria   - IV Rocephin and Azithro started in ED  - ID consult appreciated  - Urine and blood cultures, urine legionella and strep sent   - Procal sent   - Per ID, continue ceftriaxone 1g daily and 500 mg azithromycin daily       # Chronic Atrial Fibrillation with RVR - Rate controlled   - S/P Cardizem, Amiodarone and Lopressor 5 IVP   - Conversion in ED  - Cardiology consult appreciated   - Continue with telemetry monitoring  - Continue with metoprolol 50mg BID for rate control    # NSTEMI   - ST depressions on ECG   - Trops <0.01 --> 0.21, CKMB elevated 14.5  - Cardiology consult appreciated   - Cardiac cath on Monday, 10/25, keep NPO after midnight on 10/24  - po prednisone for prep of contrast   - Serial ECG  - Trend cardiac enzymes  - On Heparin IV gtt started today - monitor aPTT  - F/u ECHO    #acute on chronic diastolic CHF  - IV lasix , I<O, fluid restriction     # COPD exacerbation.   - On azithromycin,  - Duonebs q4hr and PRN  - Keep SpO2>92%    # Leg pain  - Therapeutic lovenox given in am, 10/22 - Dc'ed   - LE doppler negative for DVT     # Hypokalemia   - K 3.0  - repleted        npo after mn on sunday, needs routine covid swab on sunday for cath monday   # Hypophosphatemia   - Phos 1.5  - repleted

## 2021-10-23 LAB
ALBUMIN SERPL ELPH-MCNC: 3 G/DL — LOW (ref 3.5–5.2)
ALP SERPL-CCNC: 102 U/L — SIGNIFICANT CHANGE UP (ref 30–115)
ALT FLD-CCNC: 10 U/L — SIGNIFICANT CHANGE UP (ref 0–41)
ANION GAP SERPL CALC-SCNC: 12 MMOL/L — SIGNIFICANT CHANGE UP (ref 7–14)
APTT BLD: 69 SEC — HIGH (ref 27–39.2)
APTT BLD: 74 SEC — CRITICAL HIGH (ref 27–39.2)
AST SERPL-CCNC: 28 U/L — SIGNIFICANT CHANGE UP (ref 0–41)
BASOPHILS # BLD AUTO: 0.03 K/UL — SIGNIFICANT CHANGE UP (ref 0–0.2)
BASOPHILS NFR BLD AUTO: 0.3 % — SIGNIFICANT CHANGE UP (ref 0–1)
BILIRUB SERPL-MCNC: 0.3 MG/DL — SIGNIFICANT CHANGE UP (ref 0.2–1.2)
BUN SERPL-MCNC: 13 MG/DL — SIGNIFICANT CHANGE UP (ref 10–20)
CALCIUM SERPL-MCNC: 7.4 MG/DL — LOW (ref 8.5–10.1)
CHLORIDE SERPL-SCNC: 106 MMOL/L — SIGNIFICANT CHANGE UP (ref 98–110)
CO2 SERPL-SCNC: 21 MMOL/L — SIGNIFICANT CHANGE UP (ref 17–32)
COVID-19 SPIKE DOMAIN AB INTERP: POSITIVE
COVID-19 SPIKE DOMAIN ANTIBODY RESULT: >250 U/ML — HIGH
CREAT SERPL-MCNC: 1 MG/DL — SIGNIFICANT CHANGE UP (ref 0.7–1.5)
EOSINOPHIL # BLD AUTO: 0.69 K/UL — SIGNIFICANT CHANGE UP (ref 0–0.7)
EOSINOPHIL NFR BLD AUTO: 7.9 % — SIGNIFICANT CHANGE UP (ref 0–8)
GLUCOSE SERPL-MCNC: 95 MG/DL — SIGNIFICANT CHANGE UP (ref 70–99)
HCT VFR BLD CALC: 32.8 % — LOW (ref 37–47)
HGB BLD-MCNC: 10.8 G/DL — LOW (ref 12–16)
IMM GRANULOCYTES NFR BLD AUTO: 0.2 % — SIGNIFICANT CHANGE UP (ref 0.1–0.3)
INR BLD: 1.33 RATIO — HIGH (ref 0.65–1.3)
LYMPHOCYTES # BLD AUTO: 1.22 K/UL — SIGNIFICANT CHANGE UP (ref 1.2–3.4)
LYMPHOCYTES # BLD AUTO: 14 % — LOW (ref 20.5–51.1)
MAGNESIUM SERPL-MCNC: 2 MG/DL — SIGNIFICANT CHANGE UP (ref 1.8–2.4)
MCHC RBC-ENTMCNC: 31.3 PG — HIGH (ref 27–31)
MCHC RBC-ENTMCNC: 32.9 G/DL — SIGNIFICANT CHANGE UP (ref 32–37)
MCV RBC AUTO: 95.1 FL — SIGNIFICANT CHANGE UP (ref 81–99)
MONOCYTES # BLD AUTO: 0.61 K/UL — HIGH (ref 0.1–0.6)
MONOCYTES NFR BLD AUTO: 7 % — SIGNIFICANT CHANGE UP (ref 1.7–9.3)
NEUTROPHILS # BLD AUTO: 6.15 K/UL — SIGNIFICANT CHANGE UP (ref 1.4–6.5)
NEUTROPHILS NFR BLD AUTO: 70.6 % — SIGNIFICANT CHANGE UP (ref 42.2–75.2)
NRBC # BLD: 0 /100 WBCS — SIGNIFICANT CHANGE UP (ref 0–0)
PLATELET # BLD AUTO: 172 K/UL — SIGNIFICANT CHANGE UP (ref 130–400)
POTASSIUM SERPL-MCNC: 3.9 MMOL/L — SIGNIFICANT CHANGE UP (ref 3.5–5)
POTASSIUM SERPL-SCNC: 3.9 MMOL/L — SIGNIFICANT CHANGE UP (ref 3.5–5)
PROCALCITONIN SERPL-MCNC: 0.8 NG/ML — HIGH (ref 0.02–0.1)
PROT SERPL-MCNC: 5.1 G/DL — LOW (ref 6–8)
PROTHROM AB SERPL-ACNC: 15.2 SEC — HIGH (ref 9.95–12.87)
RBC # BLD: 3.45 M/UL — LOW (ref 4.2–5.4)
RBC # FLD: 14.4 % — SIGNIFICANT CHANGE UP (ref 11.5–14.5)
SARS-COV-2 IGG+IGM SERPL QL IA: >250 U/ML — HIGH
SARS-COV-2 IGG+IGM SERPL QL IA: POSITIVE
SODIUM SERPL-SCNC: 139 MMOL/L — SIGNIFICANT CHANGE UP (ref 135–146)
TROPONIN T SERPL-MCNC: 0.18 NG/ML — CRITICAL HIGH
WBC # BLD: 8.72 K/UL — SIGNIFICANT CHANGE UP (ref 4.8–10.8)
WBC # FLD AUTO: 8.72 K/UL — SIGNIFICANT CHANGE UP (ref 4.8–10.8)

## 2021-10-23 PROCEDURE — 99233 SBSQ HOSP IP/OBS HIGH 50: CPT

## 2021-10-23 PROCEDURE — 93010 ELECTROCARDIOGRAM REPORT: CPT

## 2021-10-23 RX ORDER — METOPROLOL TARTRATE 50 MG
10 TABLET ORAL ONCE
Refills: 0 | Status: COMPLETED | OUTPATIENT
Start: 2021-10-23 | End: 2021-10-23

## 2021-10-23 RX ORDER — CHLORHEXIDINE GLUCONATE 213 G/1000ML
1 SOLUTION TOPICAL
Refills: 0 | Status: DISCONTINUED | OUTPATIENT
Start: 2021-10-23 | End: 2021-10-28

## 2021-10-23 RX ADMIN — Medication 0.5 MILLIGRAM(S): at 17:33

## 2021-10-23 RX ADMIN — Medication 10 MILLIGRAM(S): at 06:26

## 2021-10-23 RX ADMIN — CHLORHEXIDINE GLUCONATE 1 APPLICATION(S): 213 SOLUTION TOPICAL at 05:57

## 2021-10-23 RX ADMIN — CEFTRIAXONE 100 MILLIGRAM(S): 500 INJECTION, POWDER, FOR SOLUTION INTRAMUSCULAR; INTRAVENOUS at 23:02

## 2021-10-23 RX ADMIN — CLOPIDOGREL BISULFATE 75 MILLIGRAM(S): 75 TABLET, FILM COATED ORAL at 14:06

## 2021-10-23 RX ADMIN — Medication 81 MILLIGRAM(S): at 14:06

## 2021-10-23 RX ADMIN — Medication 50 MILLIGRAM(S): at 05:12

## 2021-10-23 RX ADMIN — ATORVASTATIN CALCIUM 20 MILLIGRAM(S): 80 TABLET, FILM COATED ORAL at 23:02

## 2021-10-23 RX ADMIN — Medication 3 MILLILITER(S): at 19:28

## 2021-10-23 RX ADMIN — MIDODRINE HYDROCHLORIDE 10 MILLIGRAM(S): 2.5 TABLET ORAL at 14:05

## 2021-10-23 RX ADMIN — Medication 3 MILLILITER(S): at 07:22

## 2021-10-23 RX ADMIN — PANTOPRAZOLE SODIUM 40 MILLIGRAM(S): 20 TABLET, DELAYED RELEASE ORAL at 11:16

## 2021-10-23 RX ADMIN — HEPARIN SODIUM 8.5 UNIT(S)/HR: 5000 INJECTION INTRAVENOUS; SUBCUTANEOUS at 05:13

## 2021-10-23 RX ADMIN — Medication 40 MILLIGRAM(S): at 05:12

## 2021-10-23 RX ADMIN — MIDODRINE HYDROCHLORIDE 10 MILLIGRAM(S): 2.5 TABLET ORAL at 05:12

## 2021-10-23 RX ADMIN — AZITHROMYCIN 255 MILLIGRAM(S): 500 TABLET, FILM COATED ORAL at 23:02

## 2021-10-23 RX ADMIN — Medication 650 MILLIGRAM(S): at 23:00

## 2021-10-23 RX ADMIN — Medication 3 MILLILITER(S): at 14:31

## 2021-10-23 RX ADMIN — Medication 50 MILLIGRAM(S): at 17:04

## 2021-10-23 RX ADMIN — Medication 1 PACKET(S): at 14:06

## 2021-10-23 RX ADMIN — Medication 3 MILLILITER(S): at 03:14

## 2021-10-23 RX ADMIN — Medication 1 PACKET(S): at 06:18

## 2021-10-23 RX ADMIN — Medication 1 PACKET(S): at 17:04

## 2021-10-23 RX ADMIN — Medication 650 MILLIGRAM(S): at 00:38

## 2021-10-23 NOTE — PROGRESS NOTE ADULT - ASSESSMENT
SHAUN COATES 76y Female  MRN#: [id number]   CODE STATUS:________      SUBJECTIVE  Patient is a 76y old Female who presents with a chief complaint of Difficulty Breathing (23 Oct 2021 06:59)  Currently admitted to medicine with the primary diagnosis of Sepsis  Today is hospital day 2d, and this morning she is _________ and reports ________ overnight events.       OBJECTIVE  PAST MEDICAL & SURGICAL HISTORY  Hypertension    Depression    COPD (chronic obstructive pulmonary disease)    Other cardiomyopathy    Other emphysema    PVD (peripheral vascular disease)    Allergic reaction    Bipolar 1 disorder    FLAVIO on CPAP    Transient ischemic attack    Congestive heart failure    Falls    2019 novel coronavirus disease (COVID-19)    Respiratory failure requiring intubation    Afib    History of cholecystectomy    H/O carotid angioplasty      ALLERGIES:  codeine (Other (Moderate))  Depakote (Unknown)  Dilaudid (Short breath; Rash)  IV Contrast (Anaphylaxis)  losartan (Angioedema)  Risperdal (Other)  verapamil (Short breath; Angioedema)    MEDICATIONS:  STANDING MEDICATIONS  albuterol/ipratropium for Nebulization 3 milliLiter(s) Nebulizer every 6 hours  aspirin  chewable 81 milliGRAM(s) Oral daily  atorvastatin 20 milliGRAM(s) Oral at bedtime  azithromycin  IVPB      azithromycin  IVPB 500 milliGRAM(s) IV Intermittent every 24 hours  cefTRIAXone   IVPB 1000 milliGRAM(s) IV Intermittent every 24 hours  cefTRIAXone   IVPB      chlorhexidine 4% Liquid 1 Application(s) Topical <User Schedule>  clopidogrel Tablet 75 milliGRAM(s) Oral daily  furosemide   Injectable 40 milliGRAM(s) IV Push daily  heparin  Infusion 850 Unit(s)/Hr IV Continuous <Continuous>  metoprolol tartrate 50 milliGRAM(s) Oral two times a day  midodrine 10 milliGRAM(s) Oral every 8 hours  norepinephrine Infusion 0.05 MICROgram(s)/kG/Min IV Continuous <Continuous>  pantoprazole  Injectable 40 milliGRAM(s) IV Push daily  potassium phosphate / sodium phosphate Powder (PHOS-NaK) 1 Packet(s) Oral three times a day before meals  predniSONE   Tablet 40 milliGRAM(s) Oral daily    PRN MEDICATIONS  acetaminophen     Tablet .. 650 milliGRAM(s) Oral every 6 hours PRN  acetaminophen  Suppository .. 650 milliGRAM(s) Rectal every 8 hours PRN  LORazepam     Tablet 0.5 milliGRAM(s) Oral two times a day PRN      VITAL SIGNS: Last 24 Hours  T(C): 36.8 (23 Oct 2021 07:10), Max: 39.1 (22 Oct 2021 23:15)  T(F): 98.3 (23 Oct 2021 07:10), Max: 102.3 (22 Oct 2021 23:15)  HR: 131 (23 Oct 2021 08:59) (84 - 146)  BP: 100/66 (23 Oct 2021 08:59) (87/51 - 159/70)  BP(mean): 75 (23 Oct 2021 08:59) (65 - 101)  RR: 27 (23 Oct 2021 08:59) (18 - 58)  SpO2: 99% (23 Oct 2021 08:59) (68% - 100%)    LABS:                        10.8   8.72  )-----------( 172      ( 23 Oct 2021 06:21 )             32.8     10-23    139  |  106  |  13  ----------------------------<  95  3.9   |  21  |  1.0    Ca    7.4<L>      23 Oct 2021 06:21  Phos  1.5     10-22  Mg     2.0     10-23    TPro  5.1<L>  /  Alb  3.0<L>  /  TBili  0.3  /  DBili  x   /  AST  28  /  ALT  10  /  AlkPhos  102  10-23    PT/INR - ( 21 Oct 2021 15:10 )   PT: 15.80 sec;   INR: 1.38 ratio         PTT - ( 23 Oct 2021 06:21 )  PTT:74.0 sec  Urinalysis Basic - ( 21 Oct 2021 14:44 )    Color: Yellow / Appearance: Slightly Cloudy / S.020 / pH: x  Gluc: x / Ketone: Negative  / Bili: Negative / Urobili: 0.2 mg/dL   Blood: x / Protein: 30 mg/dL / Nitrite: Positive   Leuk Esterase: Large / RBC: 3-5 /HPF / WBC >50 /HPF   Sq Epi: x / Non Sq Epi: Few /HPF / Bacteria: Moderate      ABG - ( 21 Oct 2021 21:32 )  pH, Arterial: 7.35  pH, Blood: x     /  pCO2: 36    /  pO2: 142   / HCO3: 20    / Base Excess: -5.1  /  SaO2: 100.0             Troponin T, Serum: 0.18 ng/mL *HH* (10-23-21 @ 06:21)  Troponin T, Serum: 0.21 ng/mL *HH* (10-22-21 @ 18:44)  Troponin T, Serum: 0.18 ng/mL *HH* (10-22-21 @ 12:40)      Culture - Blood (collected 21 Oct 2021 13:56)  Source: .Blood Blood  Preliminary Report (22 Oct 2021 22:02):    No growth to date.      CARDIAC MARKERS ( 23 Oct 2021 06:21 )  x     / 0.18 ng/mL / x     / x     / x      CARDIAC MARKERS ( 22 Oct 2021 18:44 )  x     / 0.21 ng/mL / x     / x     / x      CARDIAC MARKERS ( 22 Oct 2021 12:40 )  x     / 0.18 ng/mL / x     / x     / x      CARDIAC MARKERS ( 22 Oct 2021 04:45 )  x     / 0.21 ng/mL / 152 U/L / x     / 14.5 ng/mL  CARDIAC MARKERS ( 21 Oct 2021 13:56 )  x     / <0.01 ng/mL / x     / x     / x          RADIOLOGY:      PHYSICAL EXAM:    GENERAL: NAD, well-developed, AAOx3  HEENT:  Atraumatic, Normocephalic. EOMI, PERRLA, conjunctiva and sclera clear, No JVD  PULMONARY: Clear to auscultation bilaterally; No wheeze  CARDIOVASCULAR: Regular rate and rhythm; No murmurs, rubs, or gallops  GASTROINTESTINAL: Soft, Nontender, Nondistended; Bowel sounds present  MUSCULOSKELETAL:  2+ Peripheral Pulses, No clubbing, cyanosis, or edema  NEUROLOGY: non-focal  SKIN: No rashes or lesions      ASSESSMENT & PLAN    # Sepsis on admission likely secondary to suspected GNR PNA/UTI  # Chronic Atrial Fibrillation with RVR - Rate controlled   # NSTEMI   # acute on chronic diastolic CHF on IV lasix  # COPD exacerbation.   # Leg pain, negative duplex       - f/u Urine and blood cultures, urine legionella and strep sent   - Procal 0.8  - c/w ceftriaxone and azithromycin   - cardiology following possible cath next week, keep NPO after midnight on 10/24  - c/w  metoprolol 50mg BID for rate control  - c/w heparin monitor aPTT  - IV lasix , I<O, fluid restriction   - po prednisone for prep of contrast   - Serial ECG  - Trend cardiac enzymes  - On Heparin IV gtt started today - monitor aPTT  - F/u ECHO               SHAUN COATES 76y Female  MRN#: [id number]   CODE STATUS:full ocde      SUBJECTIVE  Patient is a 76y old Female who presents with a chief complaint of Difficulty Breathing (23 Oct 2021 06:59)  Currently admitted to medicine with the primary diagnosis of Sepsis  Today is hospital day 2d, and this morning she is on BIPAP denies abdominal or chest pain       OBJECTIVE  PAST MEDICAL & SURGICAL HISTORY  Hypertension    Depression    COPD (chronic obstructive pulmonary disease)    Other cardiomyopathy    Other emphysema    PVD (peripheral vascular disease)    Allergic reaction    Bipolar 1 disorder    FLAVIO on CPAP    Transient ischemic attack    Congestive heart failure    Falls    2019 novel coronavirus disease (COVID-19)    Respiratory failure requiring intubation    Afib    History of cholecystectomy    H/O carotid angioplasty      ALLERGIES:  codeine (Other (Moderate))  Depakote (Unknown)  Dilaudid (Short breath; Rash)  IV Contrast (Anaphylaxis)  losartan (Angioedema)  Risperdal (Other)  verapamil (Short breath; Angioedema)    MEDICATIONS:  STANDING MEDICATIONS  albuterol/ipratropium for Nebulization 3 milliLiter(s) Nebulizer every 6 hours  aspirin  chewable 81 milliGRAM(s) Oral daily  atorvastatin 20 milliGRAM(s) Oral at bedtime  azithromycin  IVPB      azithromycin  IVPB 500 milliGRAM(s) IV Intermittent every 24 hours  cefTRIAXone   IVPB 1000 milliGRAM(s) IV Intermittent every 24 hours  cefTRIAXone   IVPB      chlorhexidine 4% Liquid 1 Application(s) Topical <User Schedule>  clopidogrel Tablet 75 milliGRAM(s) Oral daily  furosemide   Injectable 40 milliGRAM(s) IV Push daily  heparin  Infusion 850 Unit(s)/Hr IV Continuous <Continuous>  metoprolol tartrate 50 milliGRAM(s) Oral two times a day  midodrine 10 milliGRAM(s) Oral every 8 hours  norepinephrine Infusion 0.05 MICROgram(s)/kG/Min IV Continuous <Continuous>  pantoprazole  Injectable 40 milliGRAM(s) IV Push daily  potassium phosphate / sodium phosphate Powder (PHOS-NaK) 1 Packet(s) Oral three times a day before meals  predniSONE   Tablet 40 milliGRAM(s) Oral daily    PRN MEDICATIONS  acetaminophen     Tablet .. 650 milliGRAM(s) Oral every 6 hours PRN  acetaminophen  Suppository .. 650 milliGRAM(s) Rectal every 8 hours PRN  LORazepam     Tablet 0.5 milliGRAM(s) Oral two times a day PRN      VITAL SIGNS: Last 24 Hours  T(C): 36.8 (23 Oct 2021 07:10), Max: 39.1 (22 Oct 2021 23:15)  T(F): 98.3 (23 Oct 2021 07:10), Max: 102.3 (22 Oct 2021 23:15)  HR: 131 (23 Oct 2021 08:59) (84 - 146)  BP: 100/66 (23 Oct 2021 08:59) (87/51 - 159/70)  BP(mean): 75 (23 Oct 2021 08:59) (65 - 101)  RR: 27 (23 Oct 2021 08:59) (18 - 58)  SpO2: 99% (23 Oct 2021 08:59) (68% - 100%)    LABS:                        10.8   8.72  )-----------( 172      ( 23 Oct 2021 06:21 )             32.8     10-23    139  |  106  |  13  ----------------------------<  95  3.9   |  21  |  1.0    Ca    7.4<L>      23 Oct 2021 06:21  Phos  1.5     10-22  Mg     2.0     10-23    TPro  5.1<L>  /  Alb  3.0<L>  /  TBili  0.3  /  DBili  x   /  AST  28  /  ALT  10  /  AlkPhos  102  10-23    PT/INR - ( 21 Oct 2021 15:10 )   PT: 15.80 sec;   INR: 1.38 ratio         PTT - ( 23 Oct 2021 06:21 )  PTT:74.0 sec  Urinalysis Basic - ( 21 Oct 2021 14:44 )    Color: Yellow / Appearance: Slightly Cloudy / S.020 / pH: x  Gluc: x / Ketone: Negative  / Bili: Negative / Urobili: 0.2 mg/dL   Blood: x / Protein: 30 mg/dL / Nitrite: Positive   Leuk Esterase: Large / RBC: 3-5 /HPF / WBC >50 /HPF   Sq Epi: x / Non Sq Epi: Few /HPF / Bacteria: Moderate      ABG - ( 21 Oct 2021 21:32 )  pH, Arterial: 7.35  pH, Blood: x     /  pCO2: 36    /  pO2: 142   / HCO3: 20    / Base Excess: -5.1  /  SaO2: 100.0             Troponin T, Serum: 0.18 ng/mL *HH* (10-23-21 @ 06:21)  Troponin T, Serum: 0.21 ng/mL *HH* (10-22-21 @ 18:44)  Troponin T, Serum: 0.18 ng/mL *HH* (10-22-21 @ 12:40)      Culture - Blood (collected 21 Oct 2021 13:56)  Source: .Blood Blood  Preliminary Report (22 Oct 2021 22:02):    No growth to date.      CARDIAC MARKERS ( 23 Oct 2021 06:21 )  x     / 0.18 ng/mL / x     / x     / x      CARDIAC MARKERS ( 22 Oct 2021 18:44 )  x     / 0.21 ng/mL / x     / x     / x      CARDIAC MARKERS ( 22 Oct 2021 12:40 )  x     / 0.18 ng/mL / x     / x     / x      CARDIAC MARKERS ( 22 Oct 2021 04:45 )  x     / 0.21 ng/mL / 152 U/L / x     / 14.5 ng/mL  CARDIAC MARKERS ( 21 Oct 2021 13:56 )  x     / <0.01 ng/mL / x     / x     / x          RADIOLOGY:      PHYSICAL EXAM:    GENERAL: NAD, well-developed, AAOx3  HEENT:  Atraumatic, Normocephalic.   PULMONARY: Clear to auscultation bilaterally  CARDIOVASCULAR: Regular rate and rhythm;   GASTROINTESTINAL: Soft, Nontender, Nondistended;   MUSCULOSKELETAL:  No clubbing, cyanosis, or edema  NEUROLOGY: non-focal  SKIN: No rashes or lesions      ASSESSMENT & PLAN    # Sepsis on admission likely secondary to suspected GNR PNA/UTI  # Chronic Atrial Fibrillation with RVR - Rate controlled now   # NSTEMI   # acute on chronic diastolic CHF on IV lasix  # COPD exacerbation.   # Leg pain, negative duplex   # Incidental renal cystic lesion need MRI as outpatient    - f/u Urine and blood cultures, urine legionella and strep sent   - Procal 0.8  - c/w ceftriaxone and azithromycin   - PRN IV Lopressor   - cardiology following possible cath next week, keep NPO after midnight on 10/24  - negative CT abdomin.  - c/w  metoprolol 50mg BID for rate control  - c/w heparin monitor aPTT  - IV lasix , I<O, fluid restriction   - po prednisone for prep of contrast   - Serial ECG  - Trend cardiac enzymes  - F/u ECHO  - outpt f/u for renal cyst        Dispo: pending cardiac cath and   poor prognosis

## 2021-10-23 NOTE — PROGRESS NOTE ADULT - SUBJECTIVE AND OBJECTIVE BOX
Patient is a 76y old  Female who presents with a chief complaint of Difficulty Breathing (23 Oct 2021 06:16)      T(F): 100.2 (10-23-21 @ 03:00), Max: 102.3 (10-22-21 @ 23:15)  HR: 146 (10-23-21 @ 06:00)  BP: 134/65 (10-23-21 @ 06:00)  RR: 28 (10-23-21 @ 06:00)  SpO2: 95% (10-23-21 @ 06:00) (68% - 100%)    PHYSICAL EXAM:  GENERAL: NAD, well-groomed, well-developed  HEAD:  Atraumatic, Normocephalic  EYES: EOMI, PERRLA, conjunctiva and sclera clear  ENMT: No tonsillar erythema, exudates, or enlargement; Moist mucous membranes, Good dentition, No lesions  NECK: Supple, No JVD, Normal thyroid  NERVOUS SYSTEM:  Alert & Oriented X3,  Motor Strength 5/5 B/L upper and lower extremities  CHEST/LUNG: Clear to percussion bilaterally; No rales, rhonchi, wheezing, or rubs Decreased bs  HEART: Regular rate and rhythm; No murmurs, rubs, or gallops  ABDOMEN: Soft, Nontender, Nondistended; Bowel sounds present  EXTREMITIES:   No clubbing, cyanosis, or edema  LYMPH: No lymphadenopathy noted  SKIN: No rashes or lesions    labs  10-22    141  |  110  |  15  ----------------------------<  136<H>  3.7   |  19  |  1.0    Ca    6.9<L>      22 Oct 2021 18:44  Phos  1.5     10-22  Mg     2.3     10-22    TPro  6.0  /  Alb  3.6  /  TBili  0.6  /  DBili  x   /  AST  19  /  ALT  8   /  AlkPhos  113  10-21                          9.7    9.19  )-----------( 158      ( 22 Oct 2021 18:44 )             29.8       Culture - Blood (collected 21 Oct 2021 13:56)  Source: .Blood Blood  Preliminary Report (22 Oct 2021 22:02):    No growth to date.      PT/INR - ( 21 Oct 2021 15:10 )   PT: 15.80 sec;   INR: 1.38 ratio         PTT - ( 22 Oct 2021 18:44 )  PTT:81.2 sec    Troponin T, Serum: 0.21 ng/mL *HH* (10-22-21 @ 18:44)  Troponin T, Serum: 0.18 ng/mL *HH* (10-22-21 @ 12:40)      acetaminophen     Tablet .. 650 milliGRAM(s) Oral every 6 hours PRN  acetaminophen  Suppository .. 650 milliGRAM(s) Rectal every 8 hours PRN  albuterol/ipratropium for Nebulization 3 milliLiter(s) Nebulizer every 6 hours  aspirin  chewable 81 milliGRAM(s) Oral daily  atorvastatin 20 milliGRAM(s) Oral at bedtime  azithromycin  IVPB      azithromycin  IVPB 500 milliGRAM(s) IV Intermittent every 24 hours  cefTRIAXone   IVPB 1000 milliGRAM(s) IV Intermittent every 24 hours  cefTRIAXone   IVPB      chlorhexidine 4% Liquid 1 Application(s) Topical <User Schedule>  clopidogrel Tablet 75 milliGRAM(s) Oral daily  furosemide   Injectable 40 milliGRAM(s) IV Push daily  heparin  Infusion 850 Unit(s)/Hr IV Continuous <Continuous>  LORazepam     Tablet 0.5 milliGRAM(s) Oral two times a day PRN  metoprolol tartrate 50 milliGRAM(s) Oral two times a day  midodrine 10 milliGRAM(s) Oral every 8 hours  norepinephrine Infusion 0.05 MICROgram(s)/kG/Min IV Continuous <Continuous>  pantoprazole  Injectable 40 milliGRAM(s) IV Push daily  potassium phosphate / sodium phosphate Powder (PHOS-NaK) 1 Packet(s) Oral three times a day before meals  predniSONE   Tablet 40 milliGRAM(s) Oral daily

## 2021-10-23 NOTE — PROGRESS NOTE ADULT - ASSESSMENT
Patient on o2. Febrile. Hr was increased. NSTEMI. Continue po beta. IV beta prn. Check culture. Lasix iv. Follow labs. Possible cardiac cath next week

## 2021-10-23 NOTE — PROGRESS NOTE ADULT - SUBJECTIVE AND OBJECTIVE BOX
Patient is a 76y old  Female who presents with a chief complaint of Difficulty Breathing (22 Oct 2021 15:04)        HPI:  75 y/o PMHx COPD on home o2, A-Fib, CVA hx on aspirin and Plavix, Emphysema, HTN, s/p Carotid artheroplasty and Covid-19 in January, subsequently vaccinated.  Pt presented to the ED today with SOB and fever Tmax 102 x 4 days and one day c/o Left calf pain.   Pt denies hemoptysis or cough, but also complains of mild chest discomfort.  At present time patient is on BiPaP, s/p Cardizem push 20mg and IV infusion, and Amidarone Bolus still in afib rate uncontrolled.  Pt was given Lopressor 5mg IVP at bedside and rate now controlled in 70's.   Pt admitted to relief of chest discomfort.  Pulmonologist : Dr Vickers  Cardiologist:  Dr Bishop (21 Oct 2021 21:20)      Pt evaluated on rounds.  I reviewed the radiology tests and hospital record.    I reviewed previous notes on this patient.    Interval Events: No overnight events.    REVIEW OF SYSTEMS:   see HPI      OBJECTIVE:  ICU Vital Signs Last 24 Hrs  T(C): 37.9 (23 Oct 2021 03:00), Max: 39.1 (22 Oct 2021 23:15)  T(F): 100.2 (23 Oct 2021 03:00), Max: 102.3 (22 Oct 2021 23:15)  HR: 146 (23 Oct 2021 06:00) (84 - 146)  BP: 134/65 (23 Oct 2021 06:00) (87/51 - 159/70)  BP(mean): 78 (23 Oct 2021 06:00) (65 - 101)    RR: 28 (23 Oct 2021 06:00) (18 - 58)  SpO2: 95% (23 Oct 2021 06:00) (68% - 100%)        10-21 @ :  -  10-22 @ 07:00  --------------------------------------------------------  IN: 1265 mL / OUT: 400 mL / NET: 865 mL    10-22 @ 07:01  -  10-23 @ 06:16  --------------------------------------------------------  IN: 802 mL / OUT: 2960 mL / NET: -2158 mL      CAPILLARY BLOOD GLUCOSE            PHYSICAL EXAM:     · CONSTITUTIONAL:   not septic appearing,   well nourished,   NAD    · ENMT:   Airway patent,   Nasal mucosa clear.  Mouth with normal mucosa.   No thrush    · EYES:   Clear bilaterally,   pupils equal,   round and reactive to light.    · CARDIAC:   Normal rate,   regular rhythm.    Heart sounds S1, S2.   No murmurs, no rubs or gallops on auscultation  no edema        CAROTID:   normal systolic impulse  no bruits    · RESPIRATORY:   rales  normal chest expansion  no retractions or use of accessory muscles  palpation of chest is normal with no fremitus  percussion of chest demonstrates no hyperresonance or dullness    · GASTROINTESTINAL:  Abdomen soft,   non-tender,   + BS  liver/spleen not palpable    · MUSCULOSKELETAL:   no clubbing, cyanosis      · SKIN:   Skin normal color for race,   warm, dry   No evidence of rash.        · HEME LYMPH:   no splenomegaly.  No cervical  lymphadenopathy.  no inguinal lymphadenopathy    HOSPITAL MEDICATIONS:  MEDICATIONS  (STANDING):  albuterol/ipratropium for Nebulization 3 milliLiter(s) Nebulizer every 6 hours  aspirin  chewable 81 milliGRAM(s) Oral daily  atorvastatin 20 milliGRAM(s) Oral at bedtime  azithromycin  IVPB      azithromycin  IVPB 500 milliGRAM(s) IV Intermittent every 24 hours  cefTRIAXone   IVPB 1000 milliGRAM(s) IV Intermittent every 24 hours  cefTRIAXone   IVPB      chlorhexidine 4% Liquid 1 Application(s) Topical <User Schedule>  clopidogrel Tablet 75 milliGRAM(s) Oral daily  furosemide   Injectable 40 milliGRAM(s) IV Push daily  heparin  Infusion 850 Unit(s)/Hr (8.5 mL/Hr) IV Continuous <Continuous>  metoprolol tartrate 50 milliGRAM(s) Oral two times a day  midodrine 10 milliGRAM(s) Oral every 8 hours  norepinephrine Infusion 0.05 MICROgram(s)/kG/Min (8.08 mL/Hr) IV Continuous <Continuous>  pantoprazole  Injectable 40 milliGRAM(s) IV Push daily  potassium phosphate / sodium phosphate Powder (PHOS-NaK) 1 Packet(s) Oral three times a day before meals  predniSONE   Tablet 40 milliGRAM(s) Oral daily    MEDICATIONS  (PRN):  acetaminophen     Tablet .. 650 milliGRAM(s) Oral every 6 hours PRN Temp greater or equal to 38.5C (101.3F), Moderate Pain (4 - 6)  acetaminophen  Suppository .. 650 milliGRAM(s) Rectal every 8 hours PRN Temp greater or equal to 38C (100.4F)  LORazepam     Tablet 0.5 milliGRAM(s) Oral two times a day PRN Anxiety    sodium chloride 0.45%.: Solution, 250 milliLiter(s) infuse at 60 mL/Hr  Provider's Contact #: [phone]  sodium chloride 0.9% Bolus:   1000 milliLiter(s), IV Bolus, once, infuse over 60 Minute(s), Stop After 1 Doses  Provider's Contact #: [phone]  sodium chloride 0.9% Bolus:   250 milliLiter(s), IV Bolus, once, infuse over 30 Hour(s), Stop After 1 Doses  Provider's Contact #: [phone]  sodium chloride 0.9% Bolus:   500 milliLiter(s), IV Bolus, once, infuse over 30 Minute(s), Stop After 1 Doses  Provider's Contact #: [phone]      LABS:                        9.7    9.19  )-----------( 158      ( 22 Oct 2021 18:44 )             29.8     10-    141  |  110  |  15  ----------------------------<  136<H>  3.7   |  19  |  1.0    Ca    6.9<L>      22 Oct 2021 18:44  Phos  1.5     10-  Mg     2.3     10-    TPro  6.0  /  Alb  3.6  /  TBili  0.6  /  DBili  x   /  AST  19  /  ALT  8   /  AlkPhos  113  10-21    PT/INR - ( 21 Oct 2021 15:10 )   PT: 15.80 sec;   INR: 1.38 ratio         PTT - ( 22 Oct 2021 18:44 )  PTT:81.2 sec  Urinalysis Basic - ( 21 Oct 2021 14:44 )    Color: Yellow / Appearance: Slightly Cloudy / S.020 / pH: x  Gluc: x / Ketone: Negative  / Bili: Negative / Urobili: 0.2 mg/dL   Blood: x / Protein: 30 mg/dL / Nitrite: Positive   Leuk Esterase: Large / RBC: 3-5 /HPF / WBC >50 /HPF   Sq Epi: x / Non Sq Epi: Few /HPF / Bacteria: Moderate      Arterial Blood Gas:  10-21 @ 21:32  7.35/36/142/20/100.0/-5.1  ABG lactate: --      CARDIAC MARKERS ( 22 Oct 2021 18:44 )  x     / 0.21 ng/mL / x     / x     / x      CARDIAC MARKERS ( 22 Oct 2021 12:40 )  x     / 0.18 ng/mL / x     / x     / x      CARDIAC MARKERS ( 22 Oct 2021 04:45 )  x     / 0.21 ng/mL / 152 U/L / x     / 14.5 ng/mL  CARDIAC MARKERS ( 21 Oct 2021 13:56 )  x     / <0.01 ng/mL / x     / x     / x          SARS-CoV-2: NotDetec (22 Oct 2021 13:45)  COVID-19 PCR: NotDetec (21 Oct 2021 13:30)        ABG - ( 21 Oct 2021 21:32 )  pH, Arterial: 7.35  pH, Blood: x     /  pCO2: 36    /  pO2: 142   / HCO3: 20    / Base Excess: -5.1  /  SaO2: 100.0                 RADIOLOGY: Today I personally interpreted the latest CXR and other pertinent films.

## 2021-10-23 NOTE — PROGRESS NOTE ADULT - ASSESSMENT
IMPRESSION:  SOb    CHF exacerbation   Afib with RVR  NStemi   ?? PNA       PLAN:    CNS: no sedation     HEENT: oral care     PULMONARY: keep pox > 92 %   nebulizer Q 4 hrs and prn   NIV  continue O2 as necessary to maintain sats > 90%<94      CARDIOVASCULAR:   f/u cardiology   diuresis   keep is < os     GI: GI prophylaxis.    NPO on NIV    RENAL: follow is and os lytes     INFECTIOUS DISEASE:   continue abx    follow ID   procal   check RVP     HEMATOLOGICAL:  DVT prophylaxis.    ENDOCRINE:  Follow up FS.  Insulin protocol if needed.  icu monitoring

## 2021-10-24 LAB
ALBUMIN SERPL ELPH-MCNC: 3.1 G/DL — LOW (ref 3.5–5.2)
ALP SERPL-CCNC: 90 U/L — SIGNIFICANT CHANGE UP (ref 30–115)
ALT FLD-CCNC: 11 U/L — SIGNIFICANT CHANGE UP (ref 0–41)
ANION GAP SERPL CALC-SCNC: 14 MMOL/L — SIGNIFICANT CHANGE UP (ref 7–14)
APTT BLD: 48.2 SEC — HIGH (ref 27–39.2)
APTT BLD: 54.9 SEC — HIGH (ref 27–39.2)
AST SERPL-CCNC: 25 U/L — SIGNIFICANT CHANGE UP (ref 0–41)
BASOPHILS # BLD AUTO: 0.02 K/UL — SIGNIFICANT CHANGE UP (ref 0–0.2)
BASOPHILS NFR BLD AUTO: 0.3 % — SIGNIFICANT CHANGE UP (ref 0–1)
BILIRUB SERPL-MCNC: 0.2 MG/DL — SIGNIFICANT CHANGE UP (ref 0.2–1.2)
BUN SERPL-MCNC: 18 MG/DL — SIGNIFICANT CHANGE UP (ref 10–20)
CALCIUM SERPL-MCNC: 7.3 MG/DL — LOW (ref 8.5–10.1)
CHLORIDE SERPL-SCNC: 106 MMOL/L — SIGNIFICANT CHANGE UP (ref 98–110)
CO2 SERPL-SCNC: 22 MMOL/L — SIGNIFICANT CHANGE UP (ref 17–32)
CREAT SERPL-MCNC: 0.9 MG/DL — SIGNIFICANT CHANGE UP (ref 0.7–1.5)
EOSINOPHIL # BLD AUTO: 0.09 K/UL — SIGNIFICANT CHANGE UP (ref 0–0.7)
EOSINOPHIL NFR BLD AUTO: 1.5 % — SIGNIFICANT CHANGE UP (ref 0–8)
GLUCOSE SERPL-MCNC: 101 MG/DL — HIGH (ref 70–99)
HCT VFR BLD CALC: 28.7 % — LOW (ref 37–47)
HGB BLD-MCNC: 9.4 G/DL — LOW (ref 12–16)
IMM GRANULOCYTES NFR BLD AUTO: 0.3 % — SIGNIFICANT CHANGE UP (ref 0.1–0.3)
INR BLD: 1.18 RATIO — SIGNIFICANT CHANGE UP (ref 0.65–1.3)
LYMPHOCYTES # BLD AUTO: 1.39 K/UL — SIGNIFICANT CHANGE UP (ref 1.2–3.4)
LYMPHOCYTES # BLD AUTO: 22.7 % — SIGNIFICANT CHANGE UP (ref 20.5–51.1)
MAGNESIUM SERPL-MCNC: 2.1 MG/DL — SIGNIFICANT CHANGE UP (ref 1.8–2.4)
MCHC RBC-ENTMCNC: 31 PG — SIGNIFICANT CHANGE UP (ref 27–31)
MCHC RBC-ENTMCNC: 32.8 G/DL — SIGNIFICANT CHANGE UP (ref 32–37)
MCV RBC AUTO: 94.7 FL — SIGNIFICANT CHANGE UP (ref 81–99)
MONOCYTES # BLD AUTO: 0.47 K/UL — SIGNIFICANT CHANGE UP (ref 0.1–0.6)
MONOCYTES NFR BLD AUTO: 7.7 % — SIGNIFICANT CHANGE UP (ref 1.7–9.3)
NEUTROPHILS # BLD AUTO: 4.12 K/UL — SIGNIFICANT CHANGE UP (ref 1.4–6.5)
NEUTROPHILS NFR BLD AUTO: 67.5 % — SIGNIFICANT CHANGE UP (ref 42.2–75.2)
NRBC # BLD: 0 /100 WBCS — SIGNIFICANT CHANGE UP (ref 0–0)
PLATELET # BLD AUTO: 179 K/UL — SIGNIFICANT CHANGE UP (ref 130–400)
POTASSIUM SERPL-MCNC: 3.8 MMOL/L — SIGNIFICANT CHANGE UP (ref 3.5–5)
POTASSIUM SERPL-SCNC: 3.8 MMOL/L — SIGNIFICANT CHANGE UP (ref 3.5–5)
PROT SERPL-MCNC: 4.9 G/DL — LOW (ref 6–8)
PROTHROM AB SERPL-ACNC: 13.6 SEC — HIGH (ref 9.95–12.87)
RBC # BLD: 3.03 M/UL — LOW (ref 4.2–5.4)
RBC # FLD: 14.3 % — SIGNIFICANT CHANGE UP (ref 11.5–14.5)
SODIUM SERPL-SCNC: 142 MMOL/L — SIGNIFICANT CHANGE UP (ref 135–146)
TROPONIN T SERPL-MCNC: 0.12 NG/ML — CRITICAL HIGH
WBC # BLD: 6.11 K/UL — SIGNIFICANT CHANGE UP (ref 4.8–10.8)
WBC # FLD AUTO: 6.11 K/UL — SIGNIFICANT CHANGE UP (ref 4.8–10.8)

## 2021-10-24 PROCEDURE — 71045 X-RAY EXAM CHEST 1 VIEW: CPT | Mod: 26

## 2021-10-24 PROCEDURE — 99232 SBSQ HOSP IP/OBS MODERATE 35: CPT

## 2021-10-24 PROCEDURE — 93010 ELECTROCARDIOGRAM REPORT: CPT

## 2021-10-24 PROCEDURE — 99233 SBSQ HOSP IP/OBS HIGH 50: CPT

## 2021-10-24 RX ORDER — GABAPENTIN 400 MG/1
400 CAPSULE ORAL THREE TIMES A DAY
Refills: 0 | Status: DISCONTINUED | OUTPATIENT
Start: 2021-10-24 | End: 2021-10-24

## 2021-10-24 RX ORDER — GABAPENTIN 400 MG/1
400 CAPSULE ORAL THREE TIMES A DAY
Refills: 0 | Status: DISCONTINUED | OUTPATIENT
Start: 2021-10-24 | End: 2021-10-28

## 2021-10-24 RX ORDER — HEPARIN SODIUM 5000 [USP'U]/ML
950 INJECTION INTRAVENOUS; SUBCUTANEOUS
Qty: 25000 | Refills: 0 | Status: DISCONTINUED | OUTPATIENT
Start: 2021-10-24 | End: 2021-10-25

## 2021-10-24 RX ORDER — LIDOCAINE 4 G/100G
1 CREAM TOPICAL EVERY 24 HOURS
Refills: 0 | Status: DISCONTINUED | OUTPATIENT
Start: 2021-10-24 | End: 2021-10-28

## 2021-10-24 RX ADMIN — HEPARIN SODIUM 8.5 UNIT(S)/HR: 5000 INJECTION INTRAVENOUS; SUBCUTANEOUS at 21:48

## 2021-10-24 RX ADMIN — PANTOPRAZOLE SODIUM 40 MILLIGRAM(S): 20 TABLET, DELAYED RELEASE ORAL at 12:26

## 2021-10-24 RX ADMIN — HEPARIN SODIUM 8.5 UNIT(S)/HR: 5000 INJECTION INTRAVENOUS; SUBCUTANEOUS at 16:29

## 2021-10-24 RX ADMIN — Medication 3 MILLILITER(S): at 01:59

## 2021-10-24 RX ADMIN — Medication 40 MILLIGRAM(S): at 05:58

## 2021-10-24 RX ADMIN — Medication 3 MILLILITER(S): at 13:03

## 2021-10-24 RX ADMIN — Medication 3 MILLILITER(S): at 08:06

## 2021-10-24 RX ADMIN — Medication 0.5 MILLIGRAM(S): at 23:15

## 2021-10-24 RX ADMIN — HEPARIN SODIUM 8.5 UNIT(S)/HR: 5000 INJECTION INTRAVENOUS; SUBCUTANEOUS at 18:26

## 2021-10-24 RX ADMIN — CEFTRIAXONE 100 MILLIGRAM(S): 500 INJECTION, POWDER, FOR SOLUTION INTRAMUSCULAR; INTRAVENOUS at 22:18

## 2021-10-24 RX ADMIN — GABAPENTIN 400 MILLIGRAM(S): 400 CAPSULE ORAL at 10:06

## 2021-10-24 RX ADMIN — CHLORHEXIDINE GLUCONATE 1 APPLICATION(S): 213 SOLUTION TOPICAL at 05:58

## 2021-10-24 RX ADMIN — Medication 50 MILLIGRAM(S): at 05:59

## 2021-10-24 RX ADMIN — ATORVASTATIN CALCIUM 20 MILLIGRAM(S): 80 TABLET, FILM COATED ORAL at 21:48

## 2021-10-24 RX ADMIN — LIDOCAINE 1 PATCH: 4 CREAM TOPICAL at 19:01

## 2021-10-24 RX ADMIN — Medication 3 MILLILITER(S): at 19:58

## 2021-10-24 RX ADMIN — MIDODRINE HYDROCHLORIDE 10 MILLIGRAM(S): 2.5 TABLET ORAL at 14:01

## 2021-10-24 RX ADMIN — Medication 81 MILLIGRAM(S): at 12:26

## 2021-10-24 RX ADMIN — Medication 1 PACKET(S): at 06:18

## 2021-10-24 RX ADMIN — AZITHROMYCIN 255 MILLIGRAM(S): 500 TABLET, FILM COATED ORAL at 22:17

## 2021-10-24 RX ADMIN — Medication 1 PACKET(S): at 16:30

## 2021-10-24 RX ADMIN — Medication 1 PACKET(S): at 12:26

## 2021-10-24 RX ADMIN — Medication 50 MILLIGRAM(S): at 18:27

## 2021-10-24 RX ADMIN — HEPARIN SODIUM 9.5 UNIT(S)/HR: 5000 INJECTION INTRAVENOUS; SUBCUTANEOUS at 23:44

## 2021-10-24 RX ADMIN — Medication 0.5 MILLIGRAM(S): at 09:04

## 2021-10-24 RX ADMIN — Medication 650 MILLIGRAM(S): at 00:00

## 2021-10-24 RX ADMIN — GABAPENTIN 400 MILLIGRAM(S): 400 CAPSULE ORAL at 21:47

## 2021-10-24 RX ADMIN — HEPARIN SODIUM 8.5 UNIT(S)/HR: 5000 INJECTION INTRAVENOUS; SUBCUTANEOUS at 00:13

## 2021-10-24 RX ADMIN — MIDODRINE HYDROCHLORIDE 10 MILLIGRAM(S): 2.5 TABLET ORAL at 05:59

## 2021-10-24 RX ADMIN — GABAPENTIN 400 MILLIGRAM(S): 400 CAPSULE ORAL at 14:02

## 2021-10-24 RX ADMIN — MIDODRINE HYDROCHLORIDE 10 MILLIGRAM(S): 2.5 TABLET ORAL at 21:47

## 2021-10-24 RX ADMIN — HEPARIN SODIUM 8.5 UNIT(S)/HR: 5000 INJECTION INTRAVENOUS; SUBCUTANEOUS at 12:26

## 2021-10-24 RX ADMIN — CLOPIDOGREL BISULFATE 75 MILLIGRAM(S): 75 TABLET, FILM COATED ORAL at 12:26

## 2021-10-24 NOTE — PROGRESS NOTE ADULT - SUBJECTIVE AND OBJECTIVE BOX
Patient is a 76y old  Female who presents with a chief complaint of Difficulty Breathing (24 Oct 2021 07:42)      T(F): 97.5 (10-24-21 @ 07:10), Max: 98.7 (10-23-21 @ 15:07)  HR: 75 (10-24-21 @ 05:55)  BP: 113/57 (10-24-21 @ 05:55)  RR: 28 (10-24-21 @ 07:10)  SpO2: 98% (10-24-21 @ 05:55) (93% - 100%)    PHYSICAL EXAM:  GENERAL: NAD  HEAD:  Atraumatic, Normocephalic  EYES: EOMI, PERRLA, conjunctiva and sclera clear  NERVOUS SYSTEM:  Alert & Oriented X3, no focal deficits   CHEST/LUNG: Clear to percussion bilaterally; No rales, rhonchi, wheezing, or rubs  HEART: Regular rate and rhythm; No murmurs, rubs, or gallops  ABDOMEN: Soft, Nontender, Nondistended; Bowel sounds present  EXTREMITIES:  2+ Peripheral Pulses, No clubbing, cyanosis, or edema    LABS  10-24    x   |  x   |  18  ----------------------------<  x   x    |  22  |  x     Ca    7.3<L>      24 Oct 2021 05:38  Mg     2.1     10-24    TPro  x   /  Alb  x   /  TBili  0.2  /  DBili  x   /  AST  x   /  ALT  x   /  AlkPhos  x   10-24                          9.4    6.11  )-----------( 179      ( 24 Oct 2021 05:38 )             28.7     PT/INR - ( 24 Oct 2021 05:38 )   PT: 13.60 sec;   INR: 1.18 ratio         PTT - ( 24 Oct 2021 05:38 )  PTT:54.9 sec    CARDIAC ENZYMES  Creatine Kinase, Serum: 152 (10-22 @ 04:45)    CKMB Units: 14.5 (10-22 @ 04:45)    Troponin T, Serum: 0.12 ng/mL (10-24-21 @ 05:38)  Troponin T, Serum: 0.18 ng/mL (10-23-21 @ 06:21)  Troponin T, Serum: 0.21 ng/mL (10-22-21 @ 18:44)  Troponin T, Serum: 0.18 ng/mL (10-22-21 @ 12:40)  Troponin T, Serum: 0.21 ng/mL (10-22-21 @ 04:45)  Troponin T, Serum: <0.01 ng/mL (10-21-21 @ 13:56)      Culture Results:   10,000 - 49,000 CFU/mL Gram Negative Rods (10-21-21)  Culture Results:   No growth to date. (10-21-21)    RADIOLOGY  < from: Xray Chest 1 View- PORTABLE-Routine (Xray Chest 1 View- PORTABLE-Routine in AM.) (10.24.21 @ 07:04) >    Impression:    Bilateral opacities, slightly worse.      < end of copied text >  < from: [facility] Duplex Lower Ext Vein Scan, Bilat (10.21.21 @ 22:59) >    IMPRESSION:  No evidence of deep venous thrombosis in either lower extremity.    < end of copied text >    MEDICATIONS  (STANDING):  albuterol/ipratropium for Nebulization 3 milliLiter(s) Nebulizer every 6 hours  aspirin  chewable 81 milliGRAM(s) Oral daily  atorvastatin 20 milliGRAM(s) Oral at bedtime  azithromycin  IVPB 500 milliGRAM(s) IV Intermittent every 24 hours  cefTRIAXone   IVPB 1000 milliGRAM(s) IV Intermittent every 24 hours  chlorhexidine 4% Liquid 1 Application(s) Topical two times a day  chlorhexidine 4% Liquid 1 Application(s) Topical <User Schedule>  clopidogrel Tablet 75 milliGRAM(s) Oral daily  furosemide   Injectable 40 milliGRAM(s) IV Push daily  gabapentin 400 milliGRAM(s) Oral three times a day  heparin  Infusion 850 Unit(s)/Hr (8.5 mL/Hr) IV Continuous <Continuous>  metoprolol tartrate 50 milliGRAM(s) Oral two times a day  midodrine 10 milliGRAM(s) Oral every 8 hours  norepinephrine Infusion 0.05 MICROgram(s)/kG/Min (8.08 mL/Hr) IV Continuous <Continuous>  pantoprazole  Injectable 40 milliGRAM(s) IV Push daily  potassium phosphate / sodium phosphate Powder (PHOS-NaK) 1 Packet(s) Oral three times a day before meals  predniSONE   Tablet 40 milliGRAM(s) Oral daily    MEDICATIONS  (PRN):  acetaminophen     Tablet .. 650 milliGRAM(s) Oral every 6 hours PRN Temp greater or equal to 38.5C (101.3F), Moderate Pain (4 - 6)  acetaminophen  Suppository .. 650 milliGRAM(s) Rectal every 8 hours PRN Temp greater or equal to 38C (100.4F)  LORazepam     Tablet 0.5 milliGRAM(s) Oral two times a day PRN Anxiety      Patient denies SOB or chest pain, has chronic R hip pain      T(F): 97.5 (10-24-21 @ 07:10), Max: 98.7 (10-23-21 @ 15:07)  HR: 75 (10-24-21 @ 05:55)  BP: 113/57 (10-24-21 @ 05:55)  RR: 28 (10-24-21 @ 07:10)  SpO2: 98% (10-24-21 @ 05:55) (93% - 100%)    PHYSICAL EXAM:  GENERAL: NAD  HEAD:  Atraumatic, Normocephalic  NERVOUS SYSTEM:  Alert & Oriented X3, no focal deficits   CHEST/LUNG:  bilateral rhonchi  HEART: Regular rate and rhythm; No murmurs, rubs, or gallops  ABDOMEN: Soft, Nontender, Nondistended; Bowel sounds present  EXTREMITIES:  2+ Peripheral Pulses, No clubbing, cyanosis, or edema    LABS  10-24    x   |  x   |  18  ----------------------------<  x   x    |  22  |  x     Ca    7.3<L>      24 Oct 2021 05:38  Mg     2.1     10-24    TPro  x   /  Alb  x   /  TBili  0.2  /  DBili  x   /  AST  x   /  ALT  x   /  AlkPhos  x   10-24                          9.4    6.11  )-----------( 179      ( 24 Oct 2021 05:38 )             28.7     PT/INR - ( 24 Oct 2021 05:38 )   PT: 13.60 sec;   INR: 1.18 ratio         PTT - ( 24 Oct 2021 05:38 )  PTT:54.9 sec    < from: 12 Lead ECG (10.23.21 @ 10:16) >  Diagnosis Line Atrial fibrillation with rapid ventricular response  Nonspecific ST abnormality  Abnormal ECG    < end of copied text >  < from: TTE Echo Complete w/o Contrast w/ Doppler (10.22.21 @ 08:32) >    Summary:   1. Left ventricular ejection fraction, by visual estimation, is 55 to 60%.   2. Mild left ventricular hypertrophy.   3. There is no evidence of pericardial effusion.   4. Mild mitral annular calcification.   5. No evidence of mitral valve regurgitation.   6. Mild tricuspid regurgitation.   7. Estimated pulmonary artery systolic pressure is 53.0 mmHg assuming a right atrial pressure of 3 mmHg, which is consistent with moderate pulmonary hypertension.   8. There is mild aortic root calcification.    < end of copied text >    CARDIAC ENZYMES  Creatine Kinase, Serum: 152 (10-22 @ 04:45)    CKMB Units: 14.5 (10-22 @ 04:45)    Troponin T, Serum: 0.12 ng/mL (10-24-21 @ 05:38)  Troponin T, Serum: 0.18 ng/mL (10-23-21 @ 06:21)  Troponin T, Serum: 0.21 ng/mL (10-22-21 @ 18:44)  Troponin T, Serum: 0.18 ng/mL (10-22-21 @ 12:40)  Troponin T, Serum: 0.21 ng/mL (10-22-21 @ 04:45)  Troponin T, Serum: <0.01 ng/mL (10-21-21 @ 13:56)    Procalcitonin, Serum (10.22.21 @ 04:45)   Procalcitonin, Serum: 0.80  Culture Results:   10,000 - 49,000 CFU/mL Gram Negative Rods (10-21-21)  Culture Results:   No growth to date. (10-21-21)    RADIOLOGY  < from: Xray Chest 1 View- PORTABLE-Routine (Xray Chest 1 View- PORTABLE-Routine in AM.) (10.24.21 @ 07:04) >    Impression:    Bilateral opacities, slightly worse.      < end of copied text >  < from: [facility] Duplex Lower Ext Vein Scan, Bilat (10.21.21 @ 22:59) >    IMPRESSION:  No evidence of deep venous thrombosis in either lower extremity.    < end of copied text >    MEDICATIONS  (STANDING):  albuterol/ipratropium for Nebulization 3 milliLiter(s) Nebulizer every 6 hours  aspirin  chewable 81 milliGRAM(s) Oral daily  atorvastatin 20 milliGRAM(s) Oral at bedtime  azithromycin  IVPB 500 milliGRAM(s) IV Intermittent every 24 hours  cefTRIAXone   IVPB 1000 milliGRAM(s) IV Intermittent every 24 hours  chlorhexidine 4% Liquid 1 Application(s) Topical two times a day  chlorhexidine 4% Liquid 1 Application(s) Topical <User Schedule>  clopidogrel Tablet 75 milliGRAM(s) Oral daily  furosemide   Injectable 40 milliGRAM(s) IV Push daily  gabapentin 400 milliGRAM(s) Oral three times a day  heparin  Infusion 850 Unit(s)/Hr (8.5 mL/Hr) IV Continuous <Continuous>  metoprolol tartrate 50 milliGRAM(s) Oral two times a day  midodrine 10 milliGRAM(s) Oral every 8 hours  norepinephrine Infusion 0.05 MICROgram(s)/kG/Min (8.08 mL/Hr) IV Continuous <Continuous>  pantoprazole  Injectable 40 milliGRAM(s) IV Push daily  potassium phosphate / sodium phosphate Powder (PHOS-NaK) 1 Packet(s) Oral three times a day before meals  predniSONE   Tablet 40 milliGRAM(s) Oral daily    MEDICATIONS  (PRN):  acetaminophen     Tablet .. 650 milliGRAM(s) Oral every 6 hours PRN Temp greater or equal to 38.5C (101.3F), Moderate Pain (4 - 6)  acetaminophen  Suppository .. 650 milliGRAM(s) Rectal every 8 hours PRN Temp greater or equal to 38C (100.4F)  LORazepam     Tablet 0.5 milliGRAM(s) Oral two times a day PRN Anxiety

## 2021-10-24 NOTE — PROGRESS NOTE ADULT - ASSESSMENT
77 y/o PMHx COPD on home o2, paroxysmal A-Fib, HFpEF,  CVA,  HTN, s/p Carotid arthroplasty and Covid-19 in January, subsequently vaccinated.  Pt presented to the ED today with SOB and fever     sepsis secondary to UTI vs community acquired pneumonia / acute on chronic HFpEF /  type 2 NSTEMI      - continue antibiotics and follow final urine culture results   - aspirin, Lipitor, metoprolol   - IV Heparin, check ptt daily and adjust   - possible cardiac cath this week

## 2021-10-24 NOTE — PROGRESS NOTE ADULT - SUBJECTIVE AND OBJECTIVE BOX
Patient is a 76y old  Female who presents with a chief complaint of Difficulty Breathing (24 Oct 2021 07:10)      T(F): 97.5 (10-24-21 @ 07:10), Max: 98.7 (10-23-21 @ 15:07)  HR: 75 (10-24-21 @ 05:55)  BP: 113/57 (10-24-21 @ 05:55)  RR: 28 (10-24-21 @ 07:10)  SpO2: 98% (10-24-21 @ 05:55) (86% - 100%)    PHYSICAL EXAM:  GENERAL: NAD, well-groomed, well-developed  HEAD:  Atraumatic, Normocephalic  EYES: EOMI, PERRLA, conjunctiva and sclera clear  ENMT: No tonsillar erythema, exudates, or enlargement; Moist mucous membranes, Good dentition, No lesions  NECK: Supple, No JVD, Normal thyroid  NERVOUS SYSTEM:  Alert & Oriented X3,  Motor Strength 5/5 B/L upper and lower extremities  CHEST/LUNG: Clear to percussion bilaterally; No rales, rhonchi, wheezing, or rubs Decreased bs  HEART: Regular rate and rhythm; No murmurs, rubs, or gallops  ABDOMEN: Soft, Nontender, Nondistended; Bowel sounds present  EXTREMITIES:   No clubbing, cyanosis, or edema  LYMPH: No lymphadenopathy noted  SKIN: No rashes or lesions    labs  10-23    139  |  106  |  13  ----------------------------<  95  3.9   |  21  |  1.0    Ca    7.4<L>      23 Oct 2021 06:21  Mg     2.0     10-23    TPro  5.1<L>  /  Alb  3.0<L>  /  TBili  0.3  /  DBili  x   /  AST  28  /  ALT  10  /  AlkPhos  102  10-23                          10.8   8.72  )-----------( 172      ( 23 Oct 2021 06:21 )             32.8       Culture - Blood (collected 21 Oct 2021 13:56)  Source: .Blood Blood  Preliminary Report (22 Oct 2021 22:02):    No growth to date.      PT/INR - ( 24 Oct 2021 05:38 )   PT: 13.60 sec;   INR: 1.18 ratio         PTT - ( 24 Oct 2021 05:38 )  PTT:54.9 sec    Troponin T, Serum: 0.12 ng/mL *HH* (10-24-21 @ 05:38)      acetaminophen     Tablet .. 650 milliGRAM(s) Oral every 6 hours PRN  acetaminophen  Suppository .. 650 milliGRAM(s) Rectal every 8 hours PRN  albuterol/ipratropium for Nebulization 3 milliLiter(s) Nebulizer every 6 hours  aspirin  chewable 81 milliGRAM(s) Oral daily  atorvastatin 20 milliGRAM(s) Oral at bedtime  azithromycin  IVPB      azithromycin  IVPB 500 milliGRAM(s) IV Intermittent every 24 hours  cefTRIAXone   IVPB 1000 milliGRAM(s) IV Intermittent every 24 hours  cefTRIAXone   IVPB      chlorhexidine 4% Liquid 1 Application(s) Topical two times a day  chlorhexidine 4% Liquid 1 Application(s) Topical <User Schedule>  clopidogrel Tablet 75 milliGRAM(s) Oral daily  furosemide   Injectable 40 milliGRAM(s) IV Push daily  heparin  Infusion 850 Unit(s)/Hr IV Continuous <Continuous>  LORazepam     Tablet 0.5 milliGRAM(s) Oral two times a day PRN  metoprolol tartrate 50 milliGRAM(s) Oral two times a day  midodrine 10 milliGRAM(s) Oral every 8 hours  norepinephrine Infusion 0.05 MICROgram(s)/kG/Min IV Continuous <Continuous>  pantoprazole  Injectable 40 milliGRAM(s) IV Push daily  potassium phosphate / sodium phosphate Powder (PHOS-NaK) 1 Packet(s) Oral three times a day before meals  predniSONE   Tablet 40 milliGRAM(s) Oral daily

## 2021-10-24 NOTE — PROGRESS NOTE ADULT - SUBJECTIVE AND OBJECTIVE BOX
Patient is a 76y old  Female who presents with a chief complaint of Difficulty Breathing (23 Oct 2021 10:33)        HPI:  77 y/o PMHx COPD on home o2, A-Fib, CVA hx on aspirin and Plavix, Emphysema, HTN, s/p Carotid artheroplasty and Covid-19 in January, subsequently vaccinated.  Pt presented to the ED today with SOB and fever Tmax 102 x 4 days and one day c/o Left calf pain.   Pt denies hemoptysis or cough, but also complains of mild chest discomfort.  At present time patient is on BiPaP, s/p Cardizem push 20mg and IV infusion, and Amidarone Bolus still in afib rate uncontrolled.  Pt was given Lopressor 5mg IVP at bedside and rate now controlled in 70's.   Pt admitted to relief of chest discomfort.  Pulmonologist : Dr Vickers  Cardiologist:  Dr Bishop (21 Oct 2021 21:20)      Pt evaluated on rounds.  I reviewed the radiology tests and hospital record.    I reviewed previous notes on this patient.    Interval Events: No overnight events.    REVIEW OF SYSTEMS:   see HPI      OBJECTIVE:  ICU Vital Signs Last 24 Hrs  T(C): 35.7 (24 Oct 2021 05:00), Max: 37.1 (23 Oct 2021 15:07)  T(F): 96.2 (24 Oct 2021 05:00), Max: 98.7 (23 Oct 2021 15:07)  HR: 75 (24 Oct 2021 05:55) (62 - 142)  BP: 113/57 (24 Oct 2021 05:55) (94/59 - 153/70)  BP(mean): 78 (24 Oct 2021 05:55) (69 - 102)  ABP: --  ABP(mean): --  RR: 25 (24 Oct 2021 05:55) (16 - 36)  SpO2: 98% (24 Oct 2021 05:55) (86% - 100%)        10-23 @ 07:01  -  10-24 @ 07:00  --------------------------------------------------------  IN: 872.5 mL / OUT: 1605 mL / NET: -732.5 mL      CAPILLARY BLOOD GLUCOSE            PHYSICAL EXAM:     · CONSTITUTIONAL:   not septic appearing,   well nourished,   NAD    · ENMT:   Airway patent,   Nasal mucosa clear.  Mouth with normal mucosa.   No thrush    · EYES:   Clear bilaterally,   pupils equal,   round and reactive to light.    · CARDIAC:   Normal rate,   regular rhythm.    Heart sounds S1, S2.   No murmurs, no rubs or gallops on auscultation  no edema        CAROTID:   normal systolic impulse  no bruits    · RESPIRATORY:   rales  normal chest expansion  no retractions or use of accessory muscles  palpation of chest is normal with no fremitus  percussion of chest demonstrates no hyperresonance or dullness    · GASTROINTESTINAL:  Abdomen soft,   non-tender,   + BS  liver/spleen not palpable    · MUSCULOSKELETAL:   no clubbing, cyanosis      · SKIN:   Skin normal color for race,   warm, dry   No evidence of rash.        · HEME LYMPH:   no splenomegaly.  No cervical  lymphadenopathy.  no inguinal lymphadenopathy    HOSPITAL MEDICATIONS:  MEDICATIONS  (STANDING):  albuterol/ipratropium for Nebulization 3 milliLiter(s) Nebulizer every 6 hours  aspirin  chewable 81 milliGRAM(s) Oral daily  atorvastatin 20 milliGRAM(s) Oral at bedtime  azithromycin  IVPB      azithromycin  IVPB 500 milliGRAM(s) IV Intermittent every 24 hours  cefTRIAXone   IVPB 1000 milliGRAM(s) IV Intermittent every 24 hours  cefTRIAXone   IVPB      chlorhexidine 4% Liquid 1 Application(s) Topical two times a day  chlorhexidine 4% Liquid 1 Application(s) Topical <User Schedule>  clopidogrel Tablet 75 milliGRAM(s) Oral daily  furosemide   Injectable 40 milliGRAM(s) IV Push daily  heparin  Infusion 850 Unit(s)/Hr (8.5 mL/Hr) IV Continuous <Continuous>  metoprolol tartrate 50 milliGRAM(s) Oral two times a day  midodrine 10 milliGRAM(s) Oral every 8 hours  norepinephrine Infusion 0.05 MICROgram(s)/kG/Min (8.08 mL/Hr) IV Continuous <Continuous>  pantoprazole  Injectable 40 milliGRAM(s) IV Push daily  potassium phosphate / sodium phosphate Powder (PHOS-NaK) 1 Packet(s) Oral three times a day before meals  predniSONE   Tablet 40 milliGRAM(s) Oral daily    MEDICATIONS  (PRN):  acetaminophen     Tablet .. 650 milliGRAM(s) Oral every 6 hours PRN Temp greater or equal to 38.5C (101.3F), Moderate Pain (4 - 6)  acetaminophen  Suppository .. 650 milliGRAM(s) Rectal every 8 hours PRN Temp greater or equal to 38C (100.4F)  LORazepam     Tablet 0.5 milliGRAM(s) Oral two times a day PRN Anxiety    sodium chloride 0.45%.: Solution, 250 milliLiter(s) infuse at 60 mL/Hr  Provider's Contact #: [phone]  sodium chloride 0.9% Bolus:   1000 milliLiter(s), IV Bolus, once, infuse over 60 Minute(s), Stop After 1 Doses  Provider's Contact #: [phone]  sodium chloride 0.9% Bolus:   250 milliLiter(s), IV Bolus, once, infuse over 30 Hour(s), Stop After 1 Doses  Provider's Contact #: [phone]  sodium chloride 0.9% Bolus:   500 milliLiter(s), IV Bolus, once, infuse over 30 Minute(s), Stop After 1 Doses  Provider's Contact #: [phone]      LABS:                        10.8   8.72  )-----------( 172      ( 23 Oct 2021 06:21 )             32.8     10-23    139  |  106  |  13  ----------------------------<  95  3.9   |  21  |  1.0    Ca    7.4<L>      23 Oct 2021 06:21  Mg     2.0     10-23    TPro  5.1<L>  /  Alb  3.0<L>  /  TBili  0.3  /  DBili  x   /  AST  28  /  ALT  10  /  AlkPhos  102  10-23    PT/INR - ( 24 Oct 2021 05:38 )   PT: 13.60 sec;   INR: 1.18 ratio         PTT - ( 24 Oct 2021 05:38 )  PTT:54.9 sec        CARDIAC MARKERS ( 23 Oct 2021 06:21 )  x     / 0.18 ng/mL / x     / x     / x      CARDIAC MARKERS ( 22 Oct 2021 18:44 )  x     / 0.21 ng/mL / x     / x     / x      CARDIAC MARKERS ( 22 Oct 2021 12:40 )  x     / 0.18 ng/mL / x     / x     / x          SARS-CoV-2: NotDetec (22 Oct 2021 13:45)  COVID-19 PCR: NotDetec (21 Oct 2021 13:30)              RADIOLOGY: Today I personally interpreted the latest CXR and other pertinent films.

## 2021-10-24 NOTE — PROGRESS NOTE ADULT - ASSESSMENT
IMPRESSION:  SOb    CHF exacerbation   Afib with RVR  NStemi   ?? PNA       PLAN:    CNS: no sedation     HEENT: oral care     PULMONARY: keep pox > 92 %   nebulizer Q 4 hrs and prn   NIV  continue O2 as necessary to maintain sats > 90%<94      CARDIOVASCULAR:   f/u cardiology   diuresis   keep is < os     GI: GI prophylaxis.    NPO on NIV  diet if remains off NIV    RENAL: follow is and os lytes     INFECTIOUS DISEASE:   continue abx    follow ID   procal 0.8  RVP neg    HEMATOLOGICAL:  DVT prophylaxis.    ENDOCRINE:  Follow up FS.  Insulin protocol if needed.  icu monitoring

## 2021-10-24 NOTE — PROGRESS NOTE ADULT - SUBJECTIVE AND OBJECTIVE BOX
Patient is seen and examined at the bed side, is afebrile.      REVIEW OF SYSTEMS: All other review systems are negative      ALLERGIES: codeine (Other (Moderate))  Depakote (Unknown), Dilaudid (Short breath; Rash), IV Contrast (Anaphylaxis)  losartan (Angioedema), Risperdal (Other), verapamil (Short breath; Angioedema)        Vital Signs Last 24 Hrs  T(C): 36.2 (24 Oct 2021 10:59), Max: 37.1 (23 Oct 2021 15:07)  T(F): 97.2 (24 Oct 2021 10:59), Max: 98.7 (23 Oct 2021 15:07)  HR: 76 (24 Oct 2021 09:05) (62 - 83)  BP: 122/59 (24 Oct 2021 09:05) (104/51 - 153/70)  BP(mean): 85 (24 Oct 2021 09:05) (74 - 102)  RR: 21 (24 Oct 2021 10:59) (16 - 30)  SpO2: 97% (24 Oct 2021 09:05) (93% - 100%)        PHYSICAL EXAM:  GENERAL: Not in distress   CHEST/LUNG:  Not using accessory muscles   HEART: s1 and s2 present  ABDOMEN:  Nontender and  Nondistended  EXTREMITIES: No pedal  edema  CNS: Awake and Alert      LABS:                        9.4    6.11  )-----------( 179      ( 24 Oct 2021 05:38 )             28.7       10-24    142  |  106  |  18  ----------------------------<  101<H>  3.8   |  22  |  0.9    Ca    7.3<L>      24 Oct 2021 05:38  Mg     2.1     10-24    TPro  4.9<L>  /  Alb  3.1<L>  /  TBili  0.2  /  DBili  x   /  AST  25  /  ALT  11  /  AlkPhos  90  10-24    PT/INR - ( 24 Oct 2021 05:38 )   PT: 13.60 sec;   INR: 1.18 ratio       PTT - ( 24 Oct 2021 05:38 )  PTT:54.9 sec      Procalcitonin, Serum (10.22.21 @ 04:45)   Procalcitonin, Serum: 0.80:    MEDICATIONS  (STANDING):    albuterol/ipratropium for Nebulization 3 milliLiter(s) Nebulizer every 6 hours  aspirin  chewable 81 milliGRAM(s) Oral daily  atorvastatin 20 milliGRAM(s) Oral at bedtime  azithromycin  IVPB 500 milliGRAM(s) IV Intermittent every 24 hours  azithromycin  IVPB      cefTRIAXone   IVPB 1000 milliGRAM(s) IV Intermittent every 24 hours  cefTRIAXone   IVPB      chlorhexidine 4% Liquid 1 Application(s) Topical two times a day  chlorhexidine 4% Liquid 1 Application(s) Topical <User Schedule>  clopidogrel Tablet 75 milliGRAM(s) Oral daily  furosemide   Injectable 40 milliGRAM(s) IV Push daily  gabapentin 400 milliGRAM(s) Oral three times a day  heparin  Infusion 850 Unit(s)/Hr (8.5 mL/Hr) IV Continuous <Continuous>  metoprolol tartrate 50 milliGRAM(s) Oral two times a day  midodrine 10 milliGRAM(s) Oral every 8 hours  norepinephrine Infusion 0.05 MICROgram(s)/kG/Min (8.08 mL/Hr) IV Continuous <Continuous>  pantoprazole  Injectable 40 milliGRAM(s) IV Push daily  potassium phosphate / sodium phosphate Powder (PHOS-NaK) 1 Packet(s) Oral three times a day before meals  predniSONE   Tablet 40 milliGRAM(s) Oral daily    MEDICATIONS  (PRN):  acetaminophen     Tablet .. 650 milliGRAM(s) Oral every 6 hours PRN Temp greater or equal to 38.5C (101.3F), Moderate Pain (4 - 6)  acetaminophen  Suppository .. 650 milliGRAM(s) Rectal every 8 hours PRN Temp greater or equal to 38C (100.4F)  LORazepam     Tablet 0.5 milliGRAM(s) Oral two times a day PRN Anxiety        RADIOLOGY & ADDITIONAL TESTS:    < from: Xray Chest 1 View- PORTABLE-Routine (Xray Chest 1 View- PORTABLE-Routine in AM.) (10.24.21 @ 07:04) >    Bilateral opacities, slightly worse.      < from: CT Abdomen and Pelvis No Cont (10.22.21 @ 21:48) >  1.  New, small bibasilar pleural effusions, right greater than left with associated atelectasis.  2.  New hazy stranding of fat in the anterior left lower quadrant pelvic wall of uncertain significance; clinical correlation for any history of trauma, etc. is recommended.  3.  Post-cholecystectomy.  4.  No retroperitoneal hematoma is seen.  5.  No evidence of bowel obstruction.      < from: US Abdomen Complete (US Abdomen Complete .) (10.22.21 @ 16:15) >  1. Post cholecystectomy.  2. Renal lesions, likely cysts but incompletely characterized. NonemergentMRI may be obtained for further evaluation.  3. No acute abnormality.        MICROBIOLOGY DATA:    Urine Microscopic-Add On (NC) (10.21.21 @ 14:44)   Red Blood Cell - Urine: 3-5 /HPF   White Blood Cell - Urine: >50 /HPF   Bacteria: Moderate   Epithelial Cells: Few /HPF     Culture - Urine (10.21.21 @ 14:44)   Specimen Source: Clean Catch Clean Catch (Midstream)   Culture Results:   10,000 - 49,000 CFU/mL Gram Negative Rods     Culture - Blood (10.21.21 @ 13:56)   Specimen Source: .Blood Blood   Culture Results: No growth to date.     Respiratory Viral Panel with COVID-19 by [facility] (10.22.21 @ 13:45)   Rapid RVP Result: NotDetec   SARS-CoV-2: NotDetec    COVID-19 [place] Domain Antibody (10.22.21 @ 06:14)   COVID-19 [place] Domain Antibody Result: >250.00             Patient is seen and examined at the bed side, is afebrile. She is anxious about the Cardiac cath. tomorrow. The Urine culture grew GNR, not finalized yet.      REVIEW OF SYSTEMS: All other review systems are negative      ALLERGIES: codeine (Other (Moderate))  Depakote (Unknown), Dilaudid (Short breath; Rash), IV Contrast (Anaphylaxis)  losartan (Angioedema), Risperdal (Other), verapamil (Short breath; Angioedema)        Vital Signs Last 24 Hrs  T(C): 36.2 (24 Oct 2021 10:59), Max: 37.1 (23 Oct 2021 15:07)  T(F): 97.2 (24 Oct 2021 10:59), Max: 98.7 (23 Oct 2021 15:07)  HR: 76 (24 Oct 2021 09:05) (62 - 83)  BP: 122/59 (24 Oct 2021 09:05) (104/51 - 153/70)  BP(mean): 85 (24 Oct 2021 09:05) (74 - 102)  RR: 21 (24 Oct 2021 10:59) (16 - 30)  SpO2: 97% (24 Oct 2021 09:05) (93% - 100%)        PHYSICAL EXAM:  GENERAL: Not in distress, on oxygen via NC  CHEST/LUNG:  Not using accessory muscles   HEART: s1 and s2 present  ABDOMEN:  Nontender and  Nondistended  EXTREMITIES: No pedal  edema  CNS: Awake and Alert      LABS:                        9.4    6.11  )-----------( 179      ( 24 Oct 2021 05:38 )             28.7       10-24    142  |  106  |  18  ----------------------------<  101<H>  3.8   |  22  |  0.9    Ca    7.3<L>      24 Oct 2021 05:38  Mg     2.1     10-24    TPro  4.9<L>  /  Alb  3.1<L>  /  TBili  0.2  /  DBili  x   /  AST  25  /  ALT  11  /  AlkPhos  90  10-24    PT/INR - ( 24 Oct 2021 05:38 )   PT: 13.60 sec;   INR: 1.18 ratio       PTT - ( 24 Oct 2021 05:38 )  PTT:54.9 sec      Procalcitonin, Serum (10.22.21 @ 04:45)   Procalcitonin, Serum: 0.80:    MEDICATIONS  (STANDING):    albuterol/ipratropium for Nebulization 3 milliLiter(s) Nebulizer every 6 hours  aspirin  chewable 81 milliGRAM(s) Oral daily  atorvastatin 20 milliGRAM(s) Oral at bedtime  azithromycin  IVPB 500 milliGRAM(s) IV Intermittent every 24 hours  azithromycin  IVPB      cefTRIAXone   IVPB 1000 milliGRAM(s) IV Intermittent every 24 hours  cefTRIAXone   IVPB      chlorhexidine 4% Liquid 1 Application(s) Topical two times a day  chlorhexidine 4% Liquid 1 Application(s) Topical <User Schedule>  clopidogrel Tablet 75 milliGRAM(s) Oral daily  furosemide   Injectable 40 milliGRAM(s) IV Push daily  gabapentin 400 milliGRAM(s) Oral three times a day  heparin  Infusion 850 Unit(s)/Hr (8.5 mL/Hr) IV Continuous <Continuous>  metoprolol tartrate 50 milliGRAM(s) Oral two times a day  midodrine 10 milliGRAM(s) Oral every 8 hours  norepinephrine Infusion 0.05 MICROgram(s)/kG/Min (8.08 mL/Hr) IV Continuous <Continuous>  pantoprazole  Injectable 40 milliGRAM(s) IV Push daily  potassium phosphate / sodium phosphate Powder (PHOS-NaK) 1 Packet(s) Oral three times a day before meals  predniSONE   Tablet 40 milliGRAM(s) Oral daily    MEDICATIONS  (PRN):  acetaminophen     Tablet .. 650 milliGRAM(s) Oral every 6 hours PRN Temp greater or equal to 38.5C (101.3F), Moderate Pain (4 - 6)  acetaminophen  Suppository .. 650 milliGRAM(s) Rectal every 8 hours PRN Temp greater or equal to 38C (100.4F)  LORazepam     Tablet 0.5 milliGRAM(s) Oral two times a day PRN Anxiety        RADIOLOGY & ADDITIONAL TESTS:    < from: Xray Chest 1 View- PORTABLE-Routine (Xray Chest 1 View- PORTABLE-Routine in AM.) (10.24.21 @ 07:04) >    Bilateral opacities, slightly worse.      < from: CT Abdomen and Pelvis No Cont (10.22.21 @ 21:48) >  1.  New, small bibasilar pleural effusions, right greater than left with associated atelectasis.  2.  New hazy stranding of fat in the anterior left lower quadrant pelvic wall of uncertain significance; clinical correlation for any history of trauma, etc. is recommended.  3.  Post-cholecystectomy.  4.  No retroperitoneal hematoma is seen.  5.  No evidence of bowel obstruction.      < from: US Abdomen Complete (US Abdomen Complete .) (10.22.21 @ 16:15) >  1. Post cholecystectomy.  2. Renal lesions, likely cysts but incompletely characterized. NonemergentMRI may be obtained for further evaluation.  3. No acute abnormality.        MICROBIOLOGY DATA:    Urine Microscopic-Add On (NC) (10.21.21 @ 14:44)   Red Blood Cell - Urine: 3-5 /HPF   White Blood Cell - Urine: >50 /HPF   Bacteria: Moderate   Epithelial Cells: Few /HPF     Culture - Urine (10.21.21 @ 14:44)   Specimen Source: Clean Catch Clean Catch (Midstream)   Culture Results:   10,000 - 49,000 CFU/mL Gram Negative Rods     Culture - Blood (10.21.21 @ 13:56)   Specimen Source: .Blood Blood   Culture Results: No growth to date.     Respiratory Viral Panel with COVID-19 by [facility] (10.22.21 @ 13:45)   Rapid RVP Result: NotDetec   SARS-CoV-2: NotDetec    COVID-19 [place] Domain Antibody (10.22.21 @ 06:14)   COVID-19 [place] Domain Antibody Result: >250.00

## 2021-10-24 NOTE — PROGRESS NOTE ADULT - ASSESSMENT
A 77 yo PMHx COPD on home o2, A-Fib, CVA hx on aspirin and Plavix, Emphysema, HTN, s/p Carotid artheroplasty and Covid-19 in 1/2021 who presents with fever and left calf pain.    IMPRESSION  #Septic Shock - CAP  # UTI- Urine Cx grew GNR  #Bilateral Leg Pain     would recommend:    1. Please obtain legionella urine AG   2. Continue ceftriaxone and Azithromycin 500 mg daily   3. Follow up Urine culture, grew GNR, not finalized yet  4. Taper off steroid as tolerated       Attending Attestation:    Spent more than 45 minutes on total encounter, more than 50 % of the visit was spent counseling and/or coordinating care by the Attending physician.     A 77 yo PMHx COPD on home o2, A-Fib, CVA hx on aspirin and Plavix, Emphysema, HTN, s/p Carotid artheroplasty and Covid-19 in 1/2021 who presents with fever and left calf pain.    IMPRESSION  #Septic Shock - CAP  # UTI- Urine Cx grew GNR  #Bilateral Leg Pain     would recommend:    1. Please obtain legionella urine AG   2. Continue ceftriaxone and Azithromycin 500 mg daily   3. Follow up Urine culture, grew GNR, not finalized yet  4. Taper off steroid as tolerated   5. Supplemental oxygenation and bronchodilator as needed    d/w patient and CCU team    Attending Attestation:    Spent more than 45 minutes on total encounter, more than 50 % of the visit was spent counseling and/or coordinating care by the Attending physician.

## 2021-10-24 NOTE — PROGRESS NOTE ADULT - ASSESSMENT
Patient on o2. Afebrile.  NSTEMI. Continue po beta. IV beta prn. Check culture. Lasix iv. Follow labs. Possible cardiac cath next week . No overt infection. If stable oob to chair

## 2021-10-25 LAB
-  AMIKACIN: SIGNIFICANT CHANGE UP
-  AMOXICILLIN/CLAVULANIC ACID: SIGNIFICANT CHANGE UP
-  AMPICILLIN/SULBACTAM: SIGNIFICANT CHANGE UP
-  AMPICILLIN: SIGNIFICANT CHANGE UP
-  AZTREONAM: SIGNIFICANT CHANGE UP
-  CEFAZOLIN: SIGNIFICANT CHANGE UP
-  CEFEPIME: SIGNIFICANT CHANGE UP
-  CEFOXITIN: SIGNIFICANT CHANGE UP
-  CEFTRIAXONE: SIGNIFICANT CHANGE UP
-  CIPROFLOXACIN: SIGNIFICANT CHANGE UP
-  ERTAPENEM: SIGNIFICANT CHANGE UP
-  GENTAMICIN: SIGNIFICANT CHANGE UP
-  IMIPENEM: SIGNIFICANT CHANGE UP
-  LEVOFLOXACIN: SIGNIFICANT CHANGE UP
-  MEROPENEM: SIGNIFICANT CHANGE UP
-  NITROFURANTOIN: SIGNIFICANT CHANGE UP
-  PIPERACILLIN/TAZOBACTAM: SIGNIFICANT CHANGE UP
-  TIGECYCLINE: SIGNIFICANT CHANGE UP
-  TOBRAMYCIN: SIGNIFICANT CHANGE UP
-  TRIMETHOPRIM/SULFAMETHOXAZOLE: SIGNIFICANT CHANGE UP
ALBUMIN SERPL ELPH-MCNC: 3.1 G/DL — LOW (ref 3.5–5.2)
ALP SERPL-CCNC: 99 U/L — SIGNIFICANT CHANGE UP (ref 30–115)
ALT FLD-CCNC: 13 U/L — SIGNIFICANT CHANGE UP (ref 0–41)
ANION GAP SERPL CALC-SCNC: 13 MMOL/L — SIGNIFICANT CHANGE UP (ref 7–14)
AST SERPL-CCNC: 21 U/L — SIGNIFICANT CHANGE UP (ref 0–41)
BASOPHILS # BLD AUTO: 0.03 K/UL — SIGNIFICANT CHANGE UP (ref 0–0.2)
BASOPHILS NFR BLD AUTO: 0.4 % — SIGNIFICANT CHANGE UP (ref 0–1)
BILIRUB SERPL-MCNC: <0.2 MG/DL — SIGNIFICANT CHANGE UP (ref 0.2–1.2)
BUN SERPL-MCNC: 21 MG/DL — HIGH (ref 10–20)
CALCIUM SERPL-MCNC: 7.6 MG/DL — LOW (ref 8.5–10.1)
CHLORIDE SERPL-SCNC: 106 MMOL/L — SIGNIFICANT CHANGE UP (ref 98–110)
CO2 SERPL-SCNC: 25 MMOL/L — SIGNIFICANT CHANGE UP (ref 17–32)
CREAT SERPL-MCNC: 0.8 MG/DL — SIGNIFICANT CHANGE UP (ref 0.7–1.5)
CULTURE RESULTS: SIGNIFICANT CHANGE UP
EOSINOPHIL # BLD AUTO: 0.09 K/UL — SIGNIFICANT CHANGE UP (ref 0–0.7)
EOSINOPHIL NFR BLD AUTO: 1.1 % — SIGNIFICANT CHANGE UP (ref 0–8)
GLUCOSE SERPL-MCNC: 99 MG/DL — SIGNIFICANT CHANGE UP (ref 70–99)
HCT VFR BLD CALC: 31.1 % — LOW (ref 37–47)
HGB BLD-MCNC: 10.3 G/DL — LOW (ref 12–16)
IMM GRANULOCYTES NFR BLD AUTO: 0.5 % — HIGH (ref 0.1–0.3)
LYMPHOCYTES # BLD AUTO: 2.06 K/UL — SIGNIFICANT CHANGE UP (ref 1.2–3.4)
LYMPHOCYTES # BLD AUTO: 26.3 % — SIGNIFICANT CHANGE UP (ref 20.5–51.1)
MCHC RBC-ENTMCNC: 31.3 PG — HIGH (ref 27–31)
MCHC RBC-ENTMCNC: 33.1 G/DL — SIGNIFICANT CHANGE UP (ref 32–37)
MCV RBC AUTO: 94.5 FL — SIGNIFICANT CHANGE UP (ref 81–99)
METHOD TYPE: SIGNIFICANT CHANGE UP
MONOCYTES # BLD AUTO: 0.54 K/UL — SIGNIFICANT CHANGE UP (ref 0.1–0.6)
MONOCYTES NFR BLD AUTO: 6.9 % — SIGNIFICANT CHANGE UP (ref 1.7–9.3)
NEUTROPHILS # BLD AUTO: 5.07 K/UL — SIGNIFICANT CHANGE UP (ref 1.4–6.5)
NEUTROPHILS NFR BLD AUTO: 64.8 % — SIGNIFICANT CHANGE UP (ref 42.2–75.2)
NRBC # BLD: 0 /100 WBCS — SIGNIFICANT CHANGE UP (ref 0–0)
ORGANISM # SPEC MICROSCOPIC CNT: SIGNIFICANT CHANGE UP
ORGANISM # SPEC MICROSCOPIC CNT: SIGNIFICANT CHANGE UP
PLATELET # BLD AUTO: 219 K/UL — SIGNIFICANT CHANGE UP (ref 130–400)
POTASSIUM SERPL-MCNC: 3.6 MMOL/L — SIGNIFICANT CHANGE UP (ref 3.5–5)
POTASSIUM SERPL-SCNC: 3.6 MMOL/L — SIGNIFICANT CHANGE UP (ref 3.5–5)
PROT SERPL-MCNC: 5.3 G/DL — LOW (ref 6–8)
RBC # BLD: 3.29 M/UL — LOW (ref 4.2–5.4)
RBC # FLD: 14.3 % — SIGNIFICANT CHANGE UP (ref 11.5–14.5)
SODIUM SERPL-SCNC: 144 MMOL/L — SIGNIFICANT CHANGE UP (ref 135–146)
SPECIMEN SOURCE: SIGNIFICANT CHANGE UP
WBC # BLD: 7.83 K/UL — SIGNIFICANT CHANGE UP (ref 4.8–10.8)
WBC # FLD AUTO: 7.83 K/UL — SIGNIFICANT CHANGE UP (ref 4.8–10.8)

## 2021-10-25 PROCEDURE — 99232 SBSQ HOSP IP/OBS MODERATE 35: CPT

## 2021-10-25 PROCEDURE — 71045 X-RAY EXAM CHEST 1 VIEW: CPT | Mod: 26

## 2021-10-25 PROCEDURE — 93458 L HRT ARTERY/VENTRICLE ANGIO: CPT | Mod: 26

## 2021-10-25 PROCEDURE — 99233 SBSQ HOSP IP/OBS HIGH 50: CPT

## 2021-10-25 RX ORDER — QUETIAPINE FUMARATE 200 MG/1
250 TABLET, FILM COATED ORAL AT BEDTIME
Refills: 0 | Status: DISCONTINUED | OUTPATIENT
Start: 2021-10-25 | End: 2021-10-28

## 2021-10-25 RX ORDER — SODIUM CHLORIDE 9 MG/ML
1000 INJECTION INTRAMUSCULAR; INTRAVENOUS; SUBCUTANEOUS
Refills: 0 | Status: DISCONTINUED | OUTPATIENT
Start: 2021-10-25 | End: 2021-10-25

## 2021-10-25 RX ORDER — LAMOTRIGINE 25 MG/1
100 TABLET, ORALLY DISINTEGRATING ORAL DAILY
Refills: 0 | Status: DISCONTINUED | OUTPATIENT
Start: 2021-10-25 | End: 2021-10-26

## 2021-10-25 RX ORDER — QUETIAPINE FUMARATE 200 MG/1
200 TABLET, FILM COATED ORAL AT BEDTIME
Refills: 0 | Status: DISCONTINUED | OUTPATIENT
Start: 2021-10-25 | End: 2021-10-25

## 2021-10-25 RX ADMIN — SODIUM CHLORIDE 75 MILLILITER(S): 9 INJECTION INTRAMUSCULAR; INTRAVENOUS; SUBCUTANEOUS at 09:09

## 2021-10-25 RX ADMIN — Medication 50 MILLIGRAM(S): at 17:32

## 2021-10-25 RX ADMIN — Medication 40 MILLIGRAM(S): at 05:15

## 2021-10-25 RX ADMIN — GABAPENTIN 400 MILLIGRAM(S): 400 CAPSULE ORAL at 21:24

## 2021-10-25 RX ADMIN — MIDODRINE HYDROCHLORIDE 10 MILLIGRAM(S): 2.5 TABLET ORAL at 21:24

## 2021-10-25 RX ADMIN — Medication 3 MILLILITER(S): at 19:33

## 2021-10-25 RX ADMIN — Medication 50 MILLIGRAM(S): at 05:15

## 2021-10-25 RX ADMIN — MIDODRINE HYDROCHLORIDE 10 MILLIGRAM(S): 2.5 TABLET ORAL at 14:22

## 2021-10-25 RX ADMIN — CHLORHEXIDINE GLUCONATE 1 APPLICATION(S): 213 SOLUTION TOPICAL at 05:17

## 2021-10-25 RX ADMIN — PANTOPRAZOLE SODIUM 40 MILLIGRAM(S): 20 TABLET, DELAYED RELEASE ORAL at 14:22

## 2021-10-25 RX ADMIN — Medication 81 MILLIGRAM(S): at 06:48

## 2021-10-25 RX ADMIN — AZITHROMYCIN 255 MILLIGRAM(S): 500 TABLET, FILM COATED ORAL at 22:33

## 2021-10-25 RX ADMIN — Medication 0.5 MILLIGRAM(S): at 21:24

## 2021-10-25 RX ADMIN — Medication 3 MILLILITER(S): at 02:09

## 2021-10-25 RX ADMIN — GABAPENTIN 400 MILLIGRAM(S): 400 CAPSULE ORAL at 05:47

## 2021-10-25 RX ADMIN — Medication 650 MILLIGRAM(S): at 23:01

## 2021-10-25 RX ADMIN — Medication 650 MILLIGRAM(S): at 22:32

## 2021-10-25 RX ADMIN — Medication 1 PACKET(S): at 16:33

## 2021-10-25 RX ADMIN — QUETIAPINE FUMARATE 250 MILLIGRAM(S): 200 TABLET, FILM COATED ORAL at 23:16

## 2021-10-25 RX ADMIN — LIDOCAINE 1 PATCH: 4 CREAM TOPICAL at 17:30

## 2021-10-25 RX ADMIN — Medication 40 MILLIGRAM(S): at 05:16

## 2021-10-25 RX ADMIN — LIDOCAINE 1 PATCH: 4 CREAM TOPICAL at 05:48

## 2021-10-25 RX ADMIN — CLOPIDOGREL BISULFATE 75 MILLIGRAM(S): 75 TABLET, FILM COATED ORAL at 06:49

## 2021-10-25 RX ADMIN — GABAPENTIN 400 MILLIGRAM(S): 400 CAPSULE ORAL at 14:22

## 2021-10-25 RX ADMIN — Medication 650 MILLIGRAM(S): at 16:33

## 2021-10-25 RX ADMIN — Medication 3 MILLILITER(S): at 15:42

## 2021-10-25 RX ADMIN — Medication 650 MILLIGRAM(S): at 16:37

## 2021-10-25 RX ADMIN — LIDOCAINE 1 PATCH: 4 CREAM TOPICAL at 16:33

## 2021-10-25 RX ADMIN — ATORVASTATIN CALCIUM 20 MILLIGRAM(S): 80 TABLET, FILM COATED ORAL at 21:24

## 2021-10-25 RX ADMIN — CEFTRIAXONE 100 MILLIGRAM(S): 500 INJECTION, POWDER, FOR SOLUTION INTRAMUSCULAR; INTRAVENOUS at 22:33

## 2021-10-25 NOTE — PROGRESS NOTE ADULT - SUBJECTIVE AND OBJECTIVE BOX
SUBJECTIVE:    Patient is a 76y old Female who presents with a chief complaint of Difficulty Breathing (25 Oct 2021 09:49)    Currently admitted to medicine with the primary diagnosis of Sepsis       Today is hospital day 4d. This morning she is resting comfortably in bed and reports no new issues or overnight events. She returned from Cath from [facility] showed moderate CAD. Patient will be monitored, anticoagulation will be resumed tomorrow per cardiology    PAST MEDICAL & SURGICAL HISTORY  Hypertension    Depression    COPD (chronic obstructive pulmonary disease)    Other cardiomyopathy    Other emphysema    PVD (peripheral vascular disease)    Allergic reaction    Bipolar 1 disorder    FLAVIO on CPAP    Transient ischemic attack    Congestive heart failure    Falls    2019 novel coronavirus disease (COVID-19)    Respiratory failure requiring intubation    Afib    History of cholecystectomy    H/O carotid angioplasty      SOCIAL HISTORY:    ALLERGIES:  codeine (Other (Moderate))  Depakote (Unknown)  Dilaudid (Short breath; Rash)  IV Contrast (Anaphylaxis)  losartan (Angioedema)  Risperdal (Other)  verapamil (Short breath; Angioedema)    MEDICATIONS:  STANDING MEDICATIONS  albuterol/ipratropium for Nebulization 3 milliLiter(s) Nebulizer every 6 hours  aspirin  chewable 81 milliGRAM(s) Oral daily  atorvastatin 20 milliGRAM(s) Oral at bedtime  azithromycin  IVPB      azithromycin  IVPB 500 milliGRAM(s) IV Intermittent every 24 hours  cefTRIAXone   IVPB 1000 milliGRAM(s) IV Intermittent every 24 hours  cefTRIAXone   IVPB      chlorhexidine 4% Liquid 1 Application(s) Topical two times a day  chlorhexidine 4% Liquid 1 Application(s) Topical <User Schedule>  clopidogrel Tablet 75 milliGRAM(s) Oral daily  furosemide   Injectable 40 milliGRAM(s) IV Push daily  gabapentin 400 milliGRAM(s) Oral three times a day  lidocaine   4% Patch 1 Patch Transdermal every 24 hours  metoprolol tartrate 50 milliGRAM(s) Oral two times a day  midodrine 10 milliGRAM(s) Oral every 8 hours  pantoprazole  Injectable 40 milliGRAM(s) IV Push daily  potassium phosphate / sodium phosphate Powder (PHOS-NaK) 1 Packet(s) Oral three times a day before meals    PRN MEDICATIONS  acetaminophen     Tablet .. 650 milliGRAM(s) Oral every 6 hours PRN  acetaminophen  Suppository .. 650 milliGRAM(s) Rectal every 8 hours PRN  LORazepam     Tablet 0.5 milliGRAM(s) Oral two times a day PRN    VITALS:   T(F): 97.2  HR: 77  BP: 143/74  RR: 20  SpO2: 94%    LABS:                        10.3   7.83  )-----------( 219      ( 25 Oct 2021 05:50 )             31.1     10-25    144  |  106  |  21<H>  ----------------------------<  99  3.6   |  25  |  0.8    Ca    7.6<L>      25 Oct 2021 05:50  Mg     2.1     10-24    TPro  5.3<L>  /  Alb  3.1<L>  /  TBili  <0.2  /  DBili  x   /  AST  21  /  ALT  13  /  AlkPhos  99  10-25    PT/INR - ( 24 Oct 2021 05:38 )   PT: 13.60 sec;   INR: 1.18 ratio         PTT - ( 24 Oct 2021 21:39 )  PTT:48.2 sec          CARDIAC MARKERS ( 24 Oct 2021 05:38 )  x     / 0.12 ng/mL / x     / x     / x          Troponin T, Serum: 0.12 ng/mL (10-24-21 @ 05:38)  Troponin T, Serum: 0.18 ng/mL (10-23-21 @ 06:21)  Troponin T, Serum: 0.21 ng/mL (10-22-21 @ 18:44)      RADIOLOGY:    PHYSICAL EXAM:  GENERAL: NAD, well-groomed, well-developed  HEAD:  NCAT  EYES: EOMI, PERRLA, conjunctiva clear  ENMT: No tonsillar erythema, exudates, or enlargement; Moist mucous membranes, Good dentition, No lesions  NECK: Supple, No JVD, Normal thyroid  NERVOUS SYSTEM: AAOX4, Good concentration;   CHEST/LUNG: decreased breath sounds bilaterally  HEART: +s1s2 RRR no m/g/r  ABDOMEN: soft, NT/ND (+) bs, no HSM  EXTREMITIES:  2+ Peripheral Pulses, No c/c/e  LYMPH: No lymphadenopathy noted  SKIN: No rashes or lesions

## 2021-10-25 NOTE — PROGRESS NOTE ADULT - ASSESSMENT
77 y/o PMHx COPD on home o2, paroxysmal A-Fib, HFpEF,  CVA,  HTN, s/p Carotid arthroplasty and Covid-19 in January, subsequently vaccinated.  Pt presented to the ED today with SOB and fever     sepsis secondary to UTI vs community acquired pneumonia / acute on chronic HFpEF - resolved /  type 2 NSTEMI      - s/p cardiac cath today -> non obstructive  CAD   - complete  antibiotic course   - aspirin, Lipitor, metoprolol   - would DC Plavix and  start Eliquis in am   - DC IV Lasix   - resume home psych meds   - ambulate    - DC planning

## 2021-10-25 NOTE — PROGRESS NOTE ADULT - ASSESSMENT
IMPRESSION:  CHF exacerbation  Afib with RVR  NSTEMI  Possible PNA      PLAN:    CNS: no sedation     HEENT: oral care     PULMONARY: keep pox > 92 %   nebulizer Q 4 hrs and prn   NIV  continue O2 as necessary to maintain sats > 90%<94      CARDIOVASCULAR:   f/u cardiology   diuresis  keep is < os     GI: GI prophylaxis.    NPO on NIV  diet if remains off NIV    RENAL: follow is and os lytes     INFECTIOUS DISEASE:  finish course of ABX, follow ID    HEMATOLOGICAL:  DVT prophylaxis.    ENDOCRINE:  Follow up FS.  Insulin protocol if needed.    Downgraded to Telemetry  Recall prn

## 2021-10-25 NOTE — PROGRESS NOTE ADULT - ASSESSMENT
A 77 yo PMHx COPD on home o2, A-Fib, CVA hx on aspirin and Plavix, Emphysema, HTN, s/p Carotid artheroplasty and Covid-19 in 1/2021 who presents with fever and left calf pain.       # Sepsis on admission likely secondary to suspected GNR PNA/UTI  # Chronic Atrial Fibrillation with RVR - Rate controlled now   # NSTEMI   # acute on chronic diastolic CHF on IV lasix  # COPD exacerbation.   # Leg pain, negative duplex   # Incidental renal cystic lesion need MRI as outpatient    - f/u Urine and blood cultures, urine legionella and strep sent   - Procal 0.8  - c/w ceftriaxone and azithromycin   - PRN IV Lopressor   - s/p cath 10/25: moderate CAD  -anticoag to be resumed tomorrow  - negative CT abdomen.  - c/w  metoprolol 50mg BID for rate control  - IV lasix , I<O, fluid restriction   CXR in am  - Serial ECG  - ECHO:   Left ventricular ejection fraction, by visual estimation, is 55 to 60%.   2. Mild left ventricular hypertrophy.   3. There is no evidence of pericardial effusion.    - outpt f/u for renal cyst

## 2021-10-25 NOTE — PROGRESS NOTE ADULT - ASSESSMENT
A 77 yo PMHx COPD on home o2, A-Fib, CVA hx on aspirin and Plavix, Emphysema, HTN, s/p Carotid artheroplasty and Covid-19 in 1/2021 who presents with fever and left calf pain.    IMPRESSION  #Septic Shock - CAP  # UTI- Urine Cx grew GNR  #Bilateral Leg Pain     would recommend:    1. Continue ceftriaxone  since its sensitive to E.coli in urine  2. Obtain Legionella urine Ag and continue  Azithromycin until result is available  4. Taper off steroid as tolerated   5. Supplemental oxygenation and bronchodilator as needed    d/w patient and CCU team    Attending Attestation:    Spent more than 45 minutes on total encounter, more than 50 % of the visit was spent counseling and/or coordinating care by the Attending physician. A 75 yo PMHx COPD on home o2, A-Fib, CVA hx on aspirin and Plavix, Emphysema, HTN, s/p Carotid artheroplasty and Covid-19 in 1/2021 who presents with fever and left calf pain.    IMPRESSION  #Septic Shock - CAP  # UTI- Urine Cx grew E.coli  #Bilateral Leg Pain     would recommend:    1. Continue ceftriaxone  since its sensitive to E.coli in urine  2. Obtain Legionella urine Ag and continue  Azithromycin until result is available  3. Taper off steroid as tolerated   4. Management of BIPAP as per CCU protocol    d/w patient and CCU team    Attending Attestation:    Spent more than 35 minutes on total encounter, more than 50 % of the visit was spent counseling and/or coordinating care by the Attending physician.

## 2021-10-25 NOTE — PROGRESS NOTE ADULT - SUBJECTIVE AND OBJECTIVE BOX
Patient is seen and examined at the bed side, is afebrile.  The Urine culture grew E.coli.      REVIEW OF SYSTEMS: All other review systems are negative      ALLERGIES: codeine (Other (Moderate))  Depakote (Unknown), Dilaudid (Short breath; Rash), IV Contrast (Anaphylaxis)  losartan (Angioedema), Risperdal (Other), verapamil (Short breath; Angioedema)        Vital Signs Last 24 Hrs  T(C): 36.5 (25 Oct 2021 15:05), Max: 37 (25 Oct 2021 03:05)  T(F): 97.7 (25 Oct 2021 15:05), Max: 98.6 (25 Oct 2021 03:05)  HR: 77 (25 Oct 2021 15:21) (69 - 89)  BP: 153/72 (25 Oct 2021 15:21) (95/54 - 162/79)  BP(mean): 104 (25 Oct 2021 15:21) (72 - 112)  RR: 34 (25 Oct 2021 15:21) (16 - 35)  SpO2: 98% (25 Oct 2021 15:21) (93% - 100%)      PHYSICAL EXAM:  GENERAL: Not in distress, on oxygen via NC  CHEST/LUNG:  Not using accessory muscles   HEART: s1 and s2 present  ABDOMEN:  Nontender and  Nondistended  EXTREMITIES: No pedal  edema  CNS: Awake and Alert      LABS:                        10.3   7.83  )-----------( 219      ( 25 Oct 2021 05:50 )             31.1                           9.4    6.11  )-----------( 179      ( 24 Oct 2021 05:38 )             28.7       10-25    144  |  106  |  21<H>  ----------------------------<  99  3.6   |  25  |  0.8    Ca    7.6<L>      25 Oct 2021 05:50  Mg     2.1     10-24    TPro  5.3<L>  /  Alb  3.1<L>  /  TBili  <0.2  /  DBili  x   /  AST  21  /  ALT  13  /  AlkPhos  99  10-25    10-24    142  |  106  |  18  ----------------------------<  101<H>  3.8   |  22  |  0.9    Ca    7.3<L>      24 Oct 2021 05:38  Mg     2.1     10-24    TPro  4.9<L>  /  Alb  3.1<L>  /  TBili  0.2  /  DBili  x   /  AST  25  /  ALT  11  /  AlkPhos  90  10-24    PT/INR - ( 24 Oct 2021 05:38 )   PT: 13.60 sec;   INR: 1.18 ratio       PTT - ( 24 Oct 2021 05:38 )  PTT:54.9 sec      Procalcitonin, Serum (10.22.21 @ 04:45)   Procalcitonin, Serum: 0.80:      MEDICATIONS  (STANDING):    albuterol/ipratropium for Nebulization 3 milliLiter(s) Nebulizer every 6 hours  aspirin  chewable 81 milliGRAM(s) Oral daily  atorvastatin 20 milliGRAM(s) Oral at bedtime  azithromycin  IVPB      azithromycin  IVPB 500 milliGRAM(s) IV Intermittent every 24 hours  cefTRIAXone   IVPB 1000 milliGRAM(s) IV Intermittent every 24 hours  cefTRIAXone   IVPB      chlorhexidine 4% Liquid 1 Application(s) Topical two times a day  chlorhexidine 4% Liquid 1 Application(s) Topical <User Schedule>  clopidogrel Tablet 75 milliGRAM(s) Oral daily  gabapentin 400 milliGRAM(s) Oral three times a day  lamoTRIgine 100 milliGRAM(s) Oral daily  lidocaine   4% Patch 1 Patch Transdermal every 24 hours  metoprolol tartrate 50 milliGRAM(s) Oral two times a day  midodrine 10 milliGRAM(s) Oral every 8 hours  pantoprazole  Injectable 40 milliGRAM(s) IV Push daily  potassium phosphate / sodium phosphate Powder (PHOS-NaK) 1 Packet(s) Oral three times a day before meals  QUEtiapine 200 milliGRAM(s) Oral at bedtime        RADIOLOGY & ADDITIONAL TESTS:    < from: Xray Chest 1 View- PORTABLE-Routine (Xray Chest 1 View- PORTABLE-Routine in AM.) (10.24.21 @ 07:04) >    Bilateral opacities, slightly worse.      < from: CT Abdomen and Pelvis No Cont (10.22.21 @ 21:48) >  1.  New, small bibasilar pleural effusions, right greater than left with associated atelectasis.  2.  New hazy stranding of fat in the anterior left lower quadrant pelvic wall of uncertain significance; clinical correlation for any history of trauma, etc. is recommended.  3.  Post-cholecystectomy.  4.  No retroperitoneal hematoma is seen.  5.  No evidence of bowel obstruction.      < from: US Abdomen Complete (US Abdomen Complete .) (10.22.21 @ 16:15) >  1. Post cholecystectomy.  2. Renal lesions, likely cysts but incompletely characterized. NonemergentMRI may be obtained for further evaluation.  3. No acute abnormality.        MICROBIOLOGY DATA:    Respiratory Viral Panel with COVID-19 by ANA (10.22.21 @ 13:45)   Rapid RVP Result: NotDetec   SARS-CoV-2: NotDetec:  Culture - Urine (10.21.21 @ 14:44)   - Amikacin: S <=16   - Amoxicillin/Clavulanic Acid: S <=8/4   - Ampicillin: S <=8 These ampicillin results predict results for amoxicillin   - Ampicillin/Sulbactam: S <=4/2 Enterobacter, Citrobacter, and Serratia may develop resistance during prolonged therapy (3-4 days)   - Aztreonam: S <=4   - Cefazolin: S <=2 (MIC_CL_COM_ENTERIC_CEFAZU) For uncomplicated UTI with K. pneumoniae, E. coli, or P. mirablis: MARCELLO <=16 is sensitive and MARCELLO >=32 is resistant. This also predicts results for oral agents cefaclor, cefdinir, cefpodoxime, cefprozil, cefuroxime axetil, cephalexin and locarbef for uncomplicated UTI. Note that some isolates may be susceptible to these agents while testing resistant to cefazolin.   - Cefepime: S <=2   - Cefoxitin: S <=8   - Ceftriaxone: S <=1 Enterobacter, Citrobacter, and Serratia may develop resistance during prolonged therapy   - Ciprofloxacin: R >2   - Ertapenem: S <=0.5   - Gentamicin: S <=2   - Imipenem: S <=1   - Levofloxacin: R >4   - Meropenem: S <=1   - Nitrofurantoin: S <=32 Should not be used to treat pyelonephritis   - Piperacillin/Tazobactam: S <=8   - Tigecycline: S <=2   - Tobramycin: S <=2   - Trimethoprim/Sulfamethoxazole: S <=0.5/9.5   Specimen Source: Clean Catch Clean Catch (Midstream)   Culture Results:   10,000 - 49,000 CFU/mL Escherichia coli     Urine Microscopic-Add On (NC) (10.21.21 @ 14:44)   Red Blood Cell - Urine: 3-5 /HPF   White Blood Cell - Urine: >50 /HPF   Bacteria: Moderate   Epithelial Cells: Few /HPF     Culture - Urine (10.21.21 @ 14:44)   Specimen Source: Clean Catch Clean Catch (Midstream)   Culture Results:   10,000 - 49,000 CFU/mL Gram Negative Rods     Culture - Blood (10.21.21 @ 13:56)   Specimen Source: .Blood Blood   Culture Results: No growth to date.     Respiratory Viral Panel with COVID-19 by [facility]NA (10.22.21 @ 13:45)   Rapid RVP Result: NotDetec   SARS-CoV-2: NotDetec    COVID-19 [place] Domain Antibody (10.22.21 @ 06:14)   COVID-19 [place] Domain Antibody Result: >250.00             Patient is seen and examined at the bed side, is afebrile.  The Urine culture grew E.coli. She is s/p cardiac cath, and tolerated the procedure well.       REVIEW OF SYSTEMS: All other review systems are negative      ALLERGIES: codeine (Other (Moderate))  Depakote (Unknown), Dilaudid (Short breath; Rash), IV Contrast (Anaphylaxis)  losartan (Angioedema), Risperdal (Other), verapamil (Short breath; Angioedema)      Vital Signs Last 24 Hrs  T(C): 36.5 (25 Oct 2021 15:05), Max: 37 (25 Oct 2021 03:05)  T(F): 97.7 (25 Oct 2021 15:05), Max: 98.6 (25 Oct 2021 03:05)  HR: 77 (25 Oct 2021 15:21) (69 - 89)  BP: 153/72 (25 Oct 2021 15:21) (95/54 - 162/79)  BP(mean): 104 (25 Oct 2021 15:21) (72 - 112)  RR: 34 (25 Oct 2021 15:21) (16 - 35)  SpO2: 98% (25 Oct 2021 15:21) (93% - 100%)      PHYSICAL EXAM:  GENERAL: Not in distress, on BIPAP  CHEST/LUNG:  Not using accessory muscles   HEART: s1 and s2 present  ABDOMEN:  Nontender and  Nondistended  EXTREMITIES: No pedal  edema  CNS: Awake and Alert      LABS:                        10.3   7.83  )-----------( 219      ( 25 Oct 2021 05:50 )             31.1                           9.4    6.11  )-----------( 179      ( 24 Oct 2021 05:38 )             28.7       10-25    144  |  106  |  21<H>  ----------------------------<  99  3.6   |  25  |  0.8    Ca    7.6<L>      25 Oct 2021 05:50  Mg     2.1     10-24    TPro  5.3<L>  /  Alb  3.1<L>  /  TBili  <0.2  /  DBili  x   /  AST  21  /  ALT  13  /  AlkPhos  99  10-25    10-24    142  |  106  |  18  ----------------------------<  101<H>  3.8   |  22  |  0.9    Ca    7.3<L>      24 Oct 2021 05:38  Mg     2.1     10-24    TPro  4.9<L>  /  Alb  3.1<L>  /  TBili  0.2  /  DBili  x   /  AST  25  /  ALT  11  /  AlkPhos  90  10-24    PT/INR - ( 24 Oct 2021 05:38 )   PT: 13.60 sec;   INR: 1.18 ratio       PTT - ( 24 Oct 2021 05:38 )  PTT:54.9 sec      Procalcitonin, Serum (10.22.21 @ 04:45)   Procalcitonin, Serum: 0.80:      MEDICATIONS  (STANDING):    albuterol/ipratropium for Nebulization 3 milliLiter(s) Nebulizer every 6 hours  aspirin  chewable 81 milliGRAM(s) Oral daily  atorvastatin 20 milliGRAM(s) Oral at bedtime  azithromycin  IVPB      azithromycin  IVPB 500 milliGRAM(s) IV Intermittent every 24 hours  cefTRIAXone   IVPB 1000 milliGRAM(s) IV Intermittent every 24 hours  cefTRIAXone   IVPB      chlorhexidine 4% Liquid 1 Application(s) Topical two times a day  chlorhexidine 4% Liquid 1 Application(s) Topical <User Schedule>  clopidogrel Tablet 75 milliGRAM(s) Oral daily  gabapentin 400 milliGRAM(s) Oral three times a day  lamoTRIgine 100 milliGRAM(s) Oral daily  lidocaine   4% Patch 1 Patch Transdermal every 24 hours  metoprolol tartrate 50 milliGRAM(s) Oral two times a day  midodrine 10 milliGRAM(s) Oral every 8 hours  pantoprazole  Injectable 40 milliGRAM(s) IV Push daily  potassium phosphate / sodium phosphate Powder (PHOS-NaK) 1 Packet(s) Oral three times a day before meals  QUEtiapine 200 milliGRAM(s) Oral at bedtime        RADIOLOGY & ADDITIONAL TESTS:    < from: Xray Chest 1 View- PORTABLE-Routine (Xray Chest 1 View- PORTABLE-Routine in AM.) (10.24.21 @ 07:04) >    Bilateral opacities, slightly worse.      < from: CT Abdomen and Pelvis No Cont (10.22.21 @ 21:48) >  1.  New, small bibasilar pleural effusions, right greater than left with associated atelectasis.  2.  New hazy stranding of fat in the anterior left lower quadrant pelvic wall of uncertain significance; clinical correlation for any history of trauma, etc. is recommended.  3.  Post-cholecystectomy.  4.  No retroperitoneal hematoma is seen.  5.  No evidence of bowel obstruction.      < from: US Abdomen Complete (US Abdomen Complete .) (10.22.21 @ 16:15) >  1. Post cholecystectomy.  2. Renal lesions, likely cysts but incompletely characterized. NonemergentMRI may be obtained for further evaluation.  3. No acute abnormality.        MICROBIOLOGY DATA:    Respiratory Viral Panel with COVID-19 by [facility]NA (10.22.21 @ 13:45)   Rapid RVP Result: NotDetec   SARS-CoV-2: NotDetec:  Culture - Urine (10.21.21 @ 14:44)   - Amikacin: S <=16   - Amoxicillin/Clavulanic Acid: S <=8/4   - Ampicillin: S <=8 These ampicillin results predict results for amoxicillin   - Ampicillin/Sulbactam: S <=4/2 Enterobacter, Citrobacter, and Serratia may develop resistance during prolonged therapy (3-4 days)   - Aztreonam: S <=4   - Cefazolin: S <=2 (MIC_CL_COM_ENTERIC_CEFAZU) For uncomplicated UTI with K. pneumoniae, E. coli, or P. mirablis: MARCELLO <=16 is sensitive and MARCELLO >=32 is resistant. This also predicts results for oral agents cefaclor, cefdinir, cefpodoxime, cefprozil, cefuroxime axetil, cephalexin and locarbef for uncomplicated UTI. Note that some isolates may be susceptible to these agents while testing resistant to cefazolin.   - Cefepime: S <=2   - Cefoxitin: S <=8   - Ceftriaxone: S <=1 Enterobacter, Citrobacter, and Serratia may develop resistance during prolonged therapy   - Ciprofloxacin: R >2   - Ertapenem: S <=0.5   - Gentamicin: S <=2   - Imipenem: S <=1   - Levofloxacin: R >4   - Meropenem: S <=1   - Nitrofurantoin: S <=32 Should not be used to treat pyelonephritis   - Piperacillin/Tazobactam: S <=8   - Tigecycline: S <=2   - Tobramycin: S <=2   - Trimethoprim/Sulfamethoxazole: S <=0.5/9.5   Specimen Source: Clean Catch Clean Catch (Midstream)   Culture Results:   10,000 - 49,000 CFU/mL Escherichia coli     Urine Microscopic-Add On (NC) (10.21.21 @ 14:44)   Red Blood Cell - Urine: 3-5 /HPF   White Blood Cell - Urine: >50 /HPF   Bacteria: Moderate   Epithelial Cells: Few /HPF     Culture - Urine (10.21.21 @ 14:44)   Specimen Source: Clean Catch Clean Catch (Midstream)   Culture Results:   10,000 - 49,000 CFU/mL Gram Negative Rods     Culture - Blood (10.21.21 @ 13:56)   Specimen Source: .Blood Blood   Culture Results: No growth to date.     Respiratory Viral Panel with COVID-19 by ANA (10.22.21 @ 13:45)   Rapid RVP Result: NotDetec   SARS-CoV-2: NotDetec    COVID-19 [place] Domain Antibody (10.22.21 @ 06:14)   COVID-19 [place] Domain Antibody Result: >250.00

## 2021-10-25 NOTE — PROGRESS NOTE ADULT - SUBJECTIVE AND OBJECTIVE BOX
Patient is a 76y old  Female who presents with a chief complaint of Difficulty Breathing (24 Oct 2021 11:31)    Over Night Events:  SP catch today, no intervention.    ROS:   All ROS are negative except HPI     PHYSICAL EXAM  ICU Vital Signs Last 24 Hrs  T(C): 36.2 (25 Oct 2021 05:00), Max: 37 (25 Oct 2021 03:05)  T(F): 97.2 (25 Oct 2021 05:00), Max: 98.6 (25 Oct 2021 03:05)  HR: 77 (25 Oct 2021 05:58) (69 - 96)  BP: 143/74 (25 Oct 2021 05:58) (95/54 - 148/65)  BP(mean): 102 (25 Oct 2021 05:58) (70 - 102)  RR: 20 (25 Oct 2021 05:58) (17 - 22)  SpO2: 94% (25 Oct 2021 05:58) (91% - 100%)      · CONSTITUTIONAL:   not septic appearing,   well nourished,   NAD    · ENMT:   Airway patent,   Nasal mucosa clear.  Mouth with normal mucosa.   No thrush    · EYES:   Clear bilaterally,   pupils equal,   round and reactive to light.    · CARDIAC:   Normal rate,   regular rhythm.    Heart sounds S1, S2.   No murmurs, no rubs or gallops on auscultation  no edema        CAROTID:   normal systolic impulse  no bruits    · RESPIRATORY:   rales  normal chest expansion  no retractions or use of accessory muscles  palpation of chest is normal with no fremitus  percussion of chest demonstrates no hyperresonance or dullness    · GASTROINTESTINAL:  Abdomen soft,   non-tender,   + BS  liver/spleen not palpable    · MUSCULOSKELETAL:   no clubbing, cyanosis      · SKIN:   Skin normal color for race,   warm, dry   No evidence of rash.    · HEME LYMPH:   no splenomegaly.  No cervical  lymphadenopathy.  no inguinal lymphadenopathy      10-24-21 @ 07:01  -  10-25-21 @ 07:00  --------------------------------------------------------  IN:    Heparin: 110.5 mL    Heparin: 66.5 mL  Total IN: 177 mL    OUT:    Indwelling Catheter - Urethral (mL): 2295 mL  Total OUT: 2295 mL    Total NET: -2118 mL      LABS:                            10.3   7.83  )-----------( 219      ( 25 Oct 2021 05:50 )             31.1     144  |  106  |  21<H>  ----------------------------<  99  3.6   |  25  |  0.8    Ca    7.6<L>      25 Oct 2021 05:50  Mg     2.1     10-24    TPro  5.3<L>  /  Alb  3.1<L>  /  TBili  <0.2  /  DBili  x   /  AST  21  /  ALT  13  /  AlkPhos  99  10-25    PT/INR - ( 24 Oct 2021 05:38 )   PT: 13.60 sec;   INR: 1.18 ratio    PTT - ( 24 Oct 2021 21:39 )  PTT:48.2 sec                                           CARDIAC MARKERS ( 24 Oct 2021 05:38 )  x     / 0.12 ng/mL / x     / x     / x        LIVER FUNCTIONS - ( 25 Oct 2021 05:50 )  Alb: 3.1 g/dL / Pro: 5.3 g/dL / ALK PHOS: 99 U/L / ALT: 13 U/L / AST: 21 U/L / GGT: x                                                MEDICATIONS  (STANDING):  albuterol/ipratropium for Nebulization 3 milliLiter(s) Nebulizer every 6 hours  aspirin  chewable 81 milliGRAM(s) Oral daily  atorvastatin 20 milliGRAM(s) Oral at bedtime  azithromycin  IVPB      azithromycin  IVPB 500 milliGRAM(s) IV Intermittent every 24 hours  cefTRIAXone   IVPB 1000 milliGRAM(s) IV Intermittent every 24 hours  cefTRIAXone   IVPB      chlorhexidine 4% Liquid 1 Application(s) Topical two times a day  chlorhexidine 4% Liquid 1 Application(s) Topical <User Schedule>  clopidogrel Tablet 75 milliGRAM(s) Oral daily  furosemide   Injectable 40 milliGRAM(s) IV Push daily  gabapentin 400 milliGRAM(s) Oral three times a day  heparin  Infusion 950 Unit(s)/Hr (9.5 mL/Hr) IV Continuous <Continuous>  lidocaine   4% Patch 1 Patch Transdermal every 24 hours  metoprolol tartrate 50 milliGRAM(s) Oral two times a day  midodrine 10 milliGRAM(s) Oral every 8 hours  norepinephrine Infusion 0.05 MICROgram(s)/kG/Min (8.08 mL/Hr) IV Continuous <Continuous>  pantoprazole  Injectable 40 milliGRAM(s) IV Push daily  potassium phosphate / sodium phosphate Powder (PHOS-NaK) 1 Packet(s) Oral three times a day before meals  sodium chloride 0.9%. 1000 milliLiter(s) (75 mL/Hr) IV Continuous <Continuous>    MEDICATIONS  (PRN):  acetaminophen     Tablet .. 650 milliGRAM(s) Oral every 6 hours PRN Temp greater or equal to 38.5C (101.3F), Moderate Pain (4 - 6)  acetaminophen  Suppository .. 650 milliGRAM(s) Rectal every 8 hours PRN Temp greater or equal to 38C (100.4F)  LORazepam     Tablet 0.5 milliGRAM(s) Oral two times a day PRN Anxiety      New X-rays reviewed:                                                                                  ECHO    CXR interpreted by me:  bilateral pulmonary vascular congestion

## 2021-10-25 NOTE — PROGRESS NOTE ADULT - ATTENDING COMMENTS
Attending Statement: I have personally performed a face to face diagnostic evaluation on this patient. The patient is suffering from:  CHF exacerbation  Afib with RVR  NSTEMI  I have reviewed the above note and agree with the history, exam and suggestions for care, except as I have noted in the text. I have amended the treatment plans as necessary.

## 2021-10-25 NOTE — PROGRESS NOTE ADULT - SUBJECTIVE AND OBJECTIVE BOX
Patient is comfortable in bed, s/p cardiac cath - mild CAD - no cp no sob      T(F): 97 (10-25-21 @ 12:14), Max: 98.6 (10-25-21 @ 03:05)  HR: 84 (10-25-21 @ 14:27)  BP: 162/79 (10-25-21 @ 14:27)  RR: 20  SpO2: 97% (10-25-21 @ 14:27) (93% - 100%)    PHYSICAL EXAM:  GENERAL: NAD  HEAD:  Atraumatic, Normocephalic  EYES: EOMI, PERRLA, conjunctiva and sclera clear  NERVOUS SYSTEM:  Alert & Oriented X3, no focal deficits   CHEST/LUNG:  bilateral rhonchi  HEART: Regular rate and rhythm; No murmurs, rubs, or gallops  ABDOMEN: Soft, Nontender, Nondistended; Bowel sounds present  EXTREMITIES:  2+ Peripheral Pulses, No clubbing, cyanosis, or edema    LABS  10-25    144  |  106  |  21<H>  ----------------------------<  99  3.6   |  25  |  0.8    Ca    7.6<L>      25 Oct 2021 05:50  Mg     2.1     10-24    TPro  5.3<L>  /  Alb  3.1<L>  /  TBili  <0.2  /  DBili  x   /  AST  21  /  ALT  13  /  AlkPhos  99  10-25                          10.3   7.83  )-----------( 219      ( 25 Oct 2021 05:50 )             31.1     PT/INR - ( 24 Oct 2021 05:38 )   PT: 13.60 sec;   INR: 1.18 ratio         PTT - ( 24 Oct 2021 21:39 )  PTT:48.2 sec    CARDIAC ENZYMES      Troponin T, Serum: 0.12 ng/mL (10-24-21 @ 05:38)  Troponin T, Serum: 0.18 ng/mL (10-23-21 @ 06:21)  Troponin T, Serum: 0.21 ng/mL (10-22-21 @ 18:44)      Culture Results:   10,000 - 49,000 CFU/mL Escherichia coli (10-21-21)  Culture Results:   No growth to date. (10-21-21)    RADIOLOGY  < from: Xray Chest 1 View- PORTABLE-Routine (Xray Chest 1 View- PORTABLE-Routine in AM.) (10.25.21 @ 06:07) >  Impression:  Stable bilateral opacities. No definite pneumothorax.    < end of copied text >    MEDICATIONS  (STANDING):  albuterol/ipratropium for Nebulization 3 milliLiter(s) Nebulizer every 6 hours  aspirin  chewable 81 milliGRAM(s) Oral daily  atorvastatin 20 milliGRAM(s) Oral at bedtime  azithromycin  IVPB 500 milliGRAM(s) IV Intermittent every 24 hours  cefTRIAXone   IVPB 1000 milliGRAM(s) IV Intermittent every 24 hours   chlorhexidine 4% Liquid 1 Application(s) Topical two times a day  chlorhexidine 4% Liquid 1 Application(s) Topical <User Schedule>  clopidogrel Tablet 75 milliGRAM(s) Oral daily  gabapentin 400 milliGRAM(s) Oral three times a day  lidocaine   4% Patch 1 Patch Transdermal every 24 hours  metoprolol tartrate 50 milliGRAM(s) Oral two times a day  midodrine 10 milliGRAM(s) Oral every 8 hours  pantoprazole  Injectable 40 milliGRAM(s) IV Push daily  potassium phosphate / sodium phosphate Powder (PHOS-NaK) 1 Packet(s) Oral three times a day before meals    MEDICATIONS  (PRN):  acetaminophen     Tablet .. 650 milliGRAM(s) Oral every 6 hours PRN Temp greater or equal to 38.5C (101.3F), Moderate Pain (4 - 6)  acetaminophen  Suppository .. 650 milliGRAM(s) Rectal every 8 hours PRN Temp greater or equal to 38C (100.4F)  LORazepam     Tablet 0.5 milliGRAM(s) Oral two times a day PRN Anxiety

## 2021-10-25 NOTE — CHART NOTE - NSCHARTNOTEFT_GEN_A_CORE
PRE-OP DIAGNOSIS:    NSTEMI    PROCEDURE:   [x] Coronary Angiogram   [x] LHC   [] RHC   [] Intervention (see below)        PHYSICIAN: Dr. Gray  INTERVENTIONAL FELLOW: Dr. Vasquez   CARDIOLOGY FELLOW: Dr. Schumacher      PROCEDURE DESCRIPTION:     Consent:  [x] Patient   [] Family Member   []  Used     Anesthesia:   [] General   [x] Sedation   [x] Local     Access & Closure:   [] 6Fr right Radial Artery   [x] 6Fr right Femoral Artery - Perclose   [] 6Fr right Femoral Vein   [] 6Fr right Brachial Vein       IV Contrast:      50  mL        Intervention: none    Implants: none     FINDINGS:     Coronary Dominance: Right    LM: Mild disease  LAD: Mild disease  Diag: Mild disease  LCx: Mild disease  OM: Mild disease  RCA: Mild disease  rPDA: Mild disease    LVEDP:      Moderately elevated      ESTIMATED BLOOD LOSS: < 10 mL      CONDITION:   [x] Good   [] Fair   [] Critical     SPECIMEN REMOVED: N/A     POST-OP DIAGNOSIS:    [] Normal Coronary Angiogram   [] Minor luminal irregularities  [x] Mild Non-obstructive Coronary Artery Disease (< 50% stenosis)   [] Significant -Vessel Coronary Artery Disease     PLAN OF CARE:   [] D/C Home Today   [x] Return to In-patient bed   [] Admit for observation   [] Return for Staged Procedure   [] CT Surgery Consult   [x] Medications: Continue medications as per primary cardiologist  [x] Can resume anticoagulation for AF starting tomorrow if deemed necessary per primary team  [x] IV Fluids: 75cc/hr for 2 hours PRE-OP DIAGNOSIS:    NSTEMI    PROCEDURE:   [x] Coronary Angiogram   [x] LHC   [] RHC   [] Intervention (see below)        PHYSICIAN: Dr. Gray  INTERVENTIONAL FELLOW: Dr. Vasquez   CARDIOLOGY FELLOW: Dr. Schumacher      PROCEDURE DESCRIPTION:     Consent:  [x] Patient   [] Family Member   []  Used     Anesthesia:   [] General   [x] Sedation   [x] Local     Access & Closure:   [] 6Fr right Radial Artery   [x] 6Fr right Femoral Artery - Perclose   [] 6Fr right Femoral Vein   [] 6Fr right Brachial Vein       IV Contrast:      50  mL        Intervention: none    Implants: none     FINDINGS:     Coronary Dominance: Right    LM: Mild disease  LAD: Mild disease  Diag: Mild disease  LCx: Mild disease  OM: Mild disease  RCA: Mild disease  rPDA: Mild disease    LVEDP:      Moderately elevated      ESTIMATED BLOOD LOSS: < 10 mL      CONDITION:   [x] Good   [] Fair   [] Critical     SPECIMEN REMOVED: N/A     POST-OP DIAGNOSIS:    [] Normal Coronary Angiogram   [] Minor luminal irregularities  [x] Mild Non-obstructive Coronary Artery Disease (< 50% stenosis)   [] Significant -Vessel Coronary Artery Disease     PLAN OF CARE:   [] D/C Home Today   [x] Return to In-patient bed   [] Admit for observation   [] Return for Staged Procedure   [] CT Surgery Consult   [x] Medications: Continue medications as per primary cardiologist  [x] Can resume anticoagulation for AF starting tomorrow per primary team  [x] IV Fluids: 75cc/hr for 3 hours

## 2021-10-26 LAB
ALBUMIN SERPL ELPH-MCNC: 3.3 G/DL — LOW (ref 3.5–5.2)
ALP SERPL-CCNC: 103 U/L — SIGNIFICANT CHANGE UP (ref 30–115)
ALT FLD-CCNC: 19 U/L — SIGNIFICANT CHANGE UP (ref 0–41)
ANION GAP SERPL CALC-SCNC: 12 MMOL/L — SIGNIFICANT CHANGE UP (ref 7–14)
AST SERPL-CCNC: 25 U/L — SIGNIFICANT CHANGE UP (ref 0–41)
BASOPHILS # BLD AUTO: 0.01 K/UL — SIGNIFICANT CHANGE UP (ref 0–0.2)
BASOPHILS NFR BLD AUTO: 0.1 % — SIGNIFICANT CHANGE UP (ref 0–1)
BILIRUB SERPL-MCNC: <0.2 MG/DL — SIGNIFICANT CHANGE UP (ref 0.2–1.2)
BUN SERPL-MCNC: 27 MG/DL — HIGH (ref 10–20)
CALCIUM SERPL-MCNC: 7.8 MG/DL — LOW (ref 8.5–10.1)
CHLORIDE SERPL-SCNC: 104 MMOL/L — SIGNIFICANT CHANGE UP (ref 98–110)
CO2 SERPL-SCNC: 26 MMOL/L — SIGNIFICANT CHANGE UP (ref 17–32)
CREAT SERPL-MCNC: 0.8 MG/DL — SIGNIFICANT CHANGE UP (ref 0.7–1.5)
CULTURE RESULTS: SIGNIFICANT CHANGE UP
EOSINOPHIL # BLD AUTO: 0.01 K/UL — SIGNIFICANT CHANGE UP (ref 0–0.7)
EOSINOPHIL NFR BLD AUTO: 0.1 % — SIGNIFICANT CHANGE UP (ref 0–8)
GLUCOSE SERPL-MCNC: 104 MG/DL — HIGH (ref 70–99)
HCT VFR BLD CALC: 36.5 % — LOW (ref 37–47)
HGB BLD-MCNC: 11.6 G/DL — LOW (ref 12–16)
IMM GRANULOCYTES NFR BLD AUTO: 0.8 % — HIGH (ref 0.1–0.3)
LYMPHOCYTES # BLD AUTO: 1.83 K/UL — SIGNIFICANT CHANGE UP (ref 1.2–3.4)
LYMPHOCYTES # BLD AUTO: 23.8 % — SIGNIFICANT CHANGE UP (ref 20.5–51.1)
MAGNESIUM SERPL-MCNC: 2.1 MG/DL — SIGNIFICANT CHANGE UP (ref 1.8–2.4)
MCHC RBC-ENTMCNC: 30.6 PG — SIGNIFICANT CHANGE UP (ref 27–31)
MCHC RBC-ENTMCNC: 31.8 G/DL — LOW (ref 32–37)
MCV RBC AUTO: 96.3 FL — SIGNIFICANT CHANGE UP (ref 81–99)
MONOCYTES # BLD AUTO: 0.71 K/UL — HIGH (ref 0.1–0.6)
MONOCYTES NFR BLD AUTO: 9.2 % — SIGNIFICANT CHANGE UP (ref 1.7–9.3)
NEUTROPHILS # BLD AUTO: 5.07 K/UL — SIGNIFICANT CHANGE UP (ref 1.4–6.5)
NEUTROPHILS NFR BLD AUTO: 66 % — SIGNIFICANT CHANGE UP (ref 42.2–75.2)
NRBC # BLD: 0 /100 WBCS — SIGNIFICANT CHANGE UP (ref 0–0)
PLATELET # BLD AUTO: 218 K/UL — SIGNIFICANT CHANGE UP (ref 130–400)
POTASSIUM SERPL-MCNC: 4.2 MMOL/L — SIGNIFICANT CHANGE UP (ref 3.5–5)
POTASSIUM SERPL-SCNC: 4.2 MMOL/L — SIGNIFICANT CHANGE UP (ref 3.5–5)
PROT SERPL-MCNC: 5.8 G/DL — LOW (ref 6–8)
RBC # BLD: 3.79 M/UL — LOW (ref 4.2–5.4)
RBC # FLD: 14.6 % — HIGH (ref 11.5–14.5)
SODIUM SERPL-SCNC: 142 MMOL/L — SIGNIFICANT CHANGE UP (ref 135–146)
SPECIMEN SOURCE: SIGNIFICANT CHANGE UP
WBC # BLD: 7.69 K/UL — SIGNIFICANT CHANGE UP (ref 4.8–10.8)
WBC # FLD AUTO: 7.69 K/UL — SIGNIFICANT CHANGE UP (ref 4.8–10.8)

## 2021-10-26 PROCEDURE — 99232 SBSQ HOSP IP/OBS MODERATE 35: CPT

## 2021-10-26 PROCEDURE — 99233 SBSQ HOSP IP/OBS HIGH 50: CPT

## 2021-10-26 PROCEDURE — 71045 X-RAY EXAM CHEST 1 VIEW: CPT | Mod: 26

## 2021-10-26 RX ORDER — APIXABAN 2.5 MG/1
5 TABLET, FILM COATED ORAL EVERY 12 HOURS
Refills: 0 | Status: DISCONTINUED | OUTPATIENT
Start: 2021-10-26 | End: 2021-10-28

## 2021-10-26 RX ORDER — LAMOTRIGINE 25 MG/1
150 TABLET, ORALLY DISINTEGRATING ORAL DAILY
Refills: 0 | Status: DISCONTINUED | OUTPATIENT
Start: 2021-10-26 | End: 2021-10-28

## 2021-10-26 RX ADMIN — LAMOTRIGINE 150 MILLIGRAM(S): 25 TABLET, ORALLY DISINTEGRATING ORAL at 11:35

## 2021-10-26 RX ADMIN — Medication 50 MILLIGRAM(S): at 05:43

## 2021-10-26 RX ADMIN — CEFTRIAXONE 100 MILLIGRAM(S): 500 INJECTION, POWDER, FOR SOLUTION INTRAMUSCULAR; INTRAVENOUS at 22:50

## 2021-10-26 RX ADMIN — Medication 3 MILLILITER(S): at 10:18

## 2021-10-26 RX ADMIN — MIDODRINE HYDROCHLORIDE 10 MILLIGRAM(S): 2.5 TABLET ORAL at 14:13

## 2021-10-26 RX ADMIN — Medication 0.5 MILLIGRAM(S): at 10:52

## 2021-10-26 RX ADMIN — Medication 1 PACKET(S): at 17:35

## 2021-10-26 RX ADMIN — Medication 1 PACKET(S): at 11:36

## 2021-10-26 RX ADMIN — MIDODRINE HYDROCHLORIDE 10 MILLIGRAM(S): 2.5 TABLET ORAL at 05:43

## 2021-10-26 RX ADMIN — Medication 50 MILLIGRAM(S): at 17:34

## 2021-10-26 RX ADMIN — GABAPENTIN 400 MILLIGRAM(S): 400 CAPSULE ORAL at 21:07

## 2021-10-26 RX ADMIN — LIDOCAINE 1 PATCH: 4 CREAM TOPICAL at 19:27

## 2021-10-26 RX ADMIN — ATORVASTATIN CALCIUM 20 MILLIGRAM(S): 80 TABLET, FILM COATED ORAL at 21:07

## 2021-10-26 RX ADMIN — CHLORHEXIDINE GLUCONATE 1 APPLICATION(S): 213 SOLUTION TOPICAL at 05:42

## 2021-10-26 RX ADMIN — Medication 1 PACKET(S): at 05:42

## 2021-10-26 RX ADMIN — Medication 3 MILLILITER(S): at 13:45

## 2021-10-26 RX ADMIN — Medication 81 MILLIGRAM(S): at 11:36

## 2021-10-26 RX ADMIN — GABAPENTIN 400 MILLIGRAM(S): 400 CAPSULE ORAL at 05:42

## 2021-10-26 RX ADMIN — Medication 3 MILLILITER(S): at 19:22

## 2021-10-26 RX ADMIN — QUETIAPINE FUMARATE 250 MILLIGRAM(S): 200 TABLET, FILM COATED ORAL at 21:07

## 2021-10-26 RX ADMIN — APIXABAN 5 MILLIGRAM(S): 2.5 TABLET, FILM COATED ORAL at 17:34

## 2021-10-26 RX ADMIN — GABAPENTIN 400 MILLIGRAM(S): 400 CAPSULE ORAL at 14:15

## 2021-10-26 RX ADMIN — AZITHROMYCIN 255 MILLIGRAM(S): 500 TABLET, FILM COATED ORAL at 22:02

## 2021-10-26 RX ADMIN — Medication 3 MILLILITER(S): at 01:35

## 2021-10-26 RX ADMIN — MIDODRINE HYDROCHLORIDE 10 MILLIGRAM(S): 2.5 TABLET ORAL at 21:07

## 2021-10-26 NOTE — PHYSICAL THERAPY INITIAL EVALUATION ADULT - SIT-TO-STAND BALANCE
[facility]P calling in regards to an Otezla refill. The patient stated the medication did more harm than good. She have an appt. with the MD on tomorrow (7/15 @ 2pm) to discuss other options. She did not want a refill and declined to speak to an RPh. Opening a physician intervention.    RW/poor plus

## 2021-10-26 NOTE — PROGRESS NOTE ADULT - ASSESSMENT
ASSESSMENT  77 y/o PMHx COPD on home o2, A-Fib, CVA hx on aspirin and Plavix, Emphysema, HTN, s/p Carotid artheroplasty and Covid-19 in 1/2021 who presents with fevers and left calf pain      IMPRESSION  #Septic Shock - CAP vs UTI   - Urine Cx E coli   - Procalcitonin, Serum: 0.80  (10.22.21 @ 04:45)  - CT Abdomen and Pelvis No Cont (10.22.21 @ 21:48): New, small bibasilar pleural effusions, right greater than left with associated atelectasis. New hazy stranding of fat in the anterior left lower quadrant pelvic wall of uncertain significance; clinical correlation for any history of trauma, etc. is recommended. Post-cholecystectomy No retroperitoneal hematoma is seen. No evidence of bowel obstruction.    #Atrial Fibrillation with RVR  #Bilateral Leg Pain   #COPD  #Hx of CVA  #Obesity BMI (kg/m2): 36.8  #DM    #Abx allergy: codeine (Other (Moderate))  Depakote (Unknown)  Dilaudid (Short breath; Rash)  IV Contrast (Anaphylaxis)  losartan (Angioedema)  Risperdal (Other)  verapamil (Short breath; Angioedema)    RECOMMENDATIONS  This is a preliminary incomplete pended note, all final recommendations to follow after interview and examination of the patient.    Please call or message on [employer] Teams if with any questions.  Spectra [phone] ASSESSMENT  75 y/o PMHx COPD on home o2, A-Fib, CVA hx on aspirin and Plavix, Emphysema, HTN, s/p Carotid artheroplasty and Covid-19 in 1/2021 who presents with fevers and left calf pain      IMPRESSION  #Septic Shock - CAP vs UTI   - Urine Cx E coli   - Procalcitonin, Serum: 0.80  (10.22.21 @ 04:45)  - CT Abdomen and Pelvis No Cont (10.22.21 @ 21:48): New, small bibasilar pleural effusions, right greater than left with associated atelectasis. New hazy stranding of fat in the anterior left lower quadrant pelvic wall of uncertain significance; clinical correlation for any history of trauma, etc. is recommended. Post-cholecystectomy No retroperitoneal hematoma is seen. No evidence of bowel obstruction.    #Atrial Fibrillation with RVR  #Bilateral Leg Pain   #COPD  #Hx of CVA  #Obesity BMI (kg/m2): 36.8  #DM    #Abx allergy: codeine (Other (Moderate))  Depakote (Unknown)  Dilaudid (Short breath; Rash)  IV Contrast (Anaphylaxis)  losartan (Angioedema)  Risperdal (Other)  verapamil (Short breath; Angioedema)    RECOMMENDATIONS  - continue ceftriaxone 1g daily - plan for 7 day course (end date 10/28) - can switch to vantin 200 mg BID to complete course when discharged   - can stop azithromycin after today's dose     Please call or message on [employer] Teams if with any questions.  Spectra [phone]

## 2021-10-26 NOTE — PHYSICAL THERAPY INITIAL EVALUATION ADULT - GAIT DEVIATIONS NOTED, PT EVAL
forward flexed trunk, dec heel strike and push off/decreased katelyn/decreased step length/decreased stride length/decreased weight-shifting ability

## 2021-10-26 NOTE — PROGRESS NOTE ADULT - SUBJECTIVE AND OBJECTIVE BOX
Patient is comfortable in bed, no complaints       T(F): 95.9 (10-25-21 @ 23:11), Max: 97.7 (10-25-21 @ 15:05)  HR: 75 (10-26-21 @ 05:43)  BP: 144/72 (10-26-21 @ 05:43)  RR: 25 (10-26-21 @ 05:43)  SpO2: 96% (10-26-21 @ 05:43) (96% - 100%)    PHYSICAL EXAM:  GENERAL: NAD  HEAD:  Atraumatic, Normocephalic  EYES: EOMI, PERRLA, conjunctiva and sclera clear  NERVOUS SYSTEM:  Alert & Oriented X3, no focal deficits   CHEST/LUNG: Clear to percussion bilaterally; No rales, rhonchi, wheezing, or rubs  HEART: Regular rate and rhythm; No murmurs, rubs, or gallops  ABDOMEN: Soft, Nontender, Nondistended; Bowel sounds present  EXTREMITIES:  2+ Peripheral Pulses, No clubbing, cyanosis, or edema    LABS  10-25    144  |  106  |  21<H>  ----------------------------<  99  3.6   |  25  |  0.8    Ca    7.6<L>      25 Oct 2021 05:50    TPro  5.3<L>  /  Alb  3.1<L>  /  TBili  <0.2  /  DBili  x   /  AST  21  /  ALT  13  /  AlkPhos  99  10-25                          11.6   7.69  )-----------( 218      ( 26 Oct 2021 06:07 )             36.5     PTT - ( 24 Oct 2021 21:39 )  PTT:48.2 sec    CARDIAC ENZYMES      Troponin T, Serum: 0.12 ng/mL (10-24-21 @ 05:38)      Culture Results:   10,000 - 49,000 CFU/mL Escherichia coli (10-21-21)  Culture Results:   No growth to date. (10-21-21)    RADIOLOGY  < from: Xray Chest 1 View- PORTABLE-Routine (Xray Chest 1 View- PORTABLE-Routine in AM.) (10.25.21 @ 06:07) >  Impression:  Stable bilateral opacities. No definite pneumothorax.    < end of copied text >    MEDICATIONS  (STANDING):  albuterol/ipratropium for Nebulization 3 milliLiter(s) Nebulizer every 6 hours  aspirin  chewable 81 milliGRAM(s) Oral daily  atorvastatin 20 milliGRAM(s) Oral at bedtime  azithromycin  IVPB 500 milliGRAM(s) IV Intermittent every 24 hours  cefTRIAXone   IVPB 1000 milliGRAM(s) IV Intermittent every 24 hours  chlorhexidine 4% Liquid 1 Application(s) Topical two times a day  chlorhexidine 4% Liquid 1 Application(s) Topical <User Schedule>  clopidogrel Tablet 75 milliGRAM(s) Oral daily  gabapentin 400 milliGRAM(s) Oral three times a day  lamoTRIgine 100 milliGRAM(s) Oral daily  lidocaine   4% Patch 1 Patch Transdermal every 24 hours  metoprolol tartrate 50 milliGRAM(s) Oral two times a day  midodrine 10 milliGRAM(s) Oral every 8 hours  pantoprazole  Injectable 40 milliGRAM(s) IV Push daily  potassium phosphate / sodium phosphate Powder (PHOS-NaK) 1 Packet(s) Oral three times a day before meals  QUEtiapine 250 milliGRAM(s) Oral at bedtime    MEDICATIONS  (PRN):  acetaminophen     Tablet .. 650 milliGRAM(s) Oral every 6 hours PRN Temp greater or equal to 38.5C (101.3F), Moderate Pain (4 - 6)  acetaminophen  Suppository .. 650 milliGRAM(s) Rectal every 8 hours PRN Temp greater or equal to 38C (100.4F)  LORazepam     Tablet 0.5 milliGRAM(s) Oral two times a day PRN Anxiety

## 2021-10-26 NOTE — PHARMACOTHERAPY INTERVENTION NOTE - COMMENTS
Notified that today is day 6 of azithro/ceftriaxone; rec to re-evaluate and clarify duration of therapy
Rec to add on symbicort and tiotropium as pt was taking it at home
Takes oral medication; rec to change to PO
as per MD aware of allergy to verapamil

## 2021-10-26 NOTE — PROGRESS NOTE ADULT - SUBJECTIVE AND OBJECTIVE BOX
SUBJECTIVE:    Patient is a 76y old Female who presents with a chief complaint of Difficulty Breathing (26 Oct 2021 07:33)    Currently admitted to medicine with the primary diagnosis of Sepsis     Today is hospital day 5d. This morning she is resting comfortably in bed and reports no new issues or overnight events. Freeman is discontinued, urine legionella sent.     PAST MEDICAL & SURGICAL HISTORY  Hypertension    Depression    COPD (chronic obstructive pulmonary disease)    Other cardiomyopathy    Other emphysema    PVD (peripheral vascular disease)    Allergic reaction    Bipolar 1 disorder    FLAVIO on CPAP    Transient ischemic attack    Congestive heart failure    Falls    2019 novel coronavirus disease (COVID-19)    Respiratory failure requiring intubation    Afib    History of cholecystectomy    H/O carotid angioplasty      SOCIAL HISTORY:    ALLERGIES:  codeine (Other (Moderate))  Depakote (Unknown)  Dilaudid (Short breath; Rash)  IV Contrast (Anaphylaxis)  losartan (Angioedema)  Risperdal (Other)  verapamil (Short breath; Angioedema)    MEDICATIONS:  STANDING MEDICATIONS  albuterol/ipratropium for Nebulization 3 milliLiter(s) Nebulizer every 6 hours  aspirin  chewable 81 milliGRAM(s) Oral daily  atorvastatin 20 milliGRAM(s) Oral at bedtime  azithromycin  IVPB      azithromycin  IVPB 500 milliGRAM(s) IV Intermittent every 24 hours  cefTRIAXone   IVPB      cefTRIAXone   IVPB 1000 milliGRAM(s) IV Intermittent every 24 hours  chlorhexidine 4% Liquid 1 Application(s) Topical two times a day  chlorhexidine 4% Liquid 1 Application(s) Topical <User Schedule>  clopidogrel Tablet 75 milliGRAM(s) Oral daily  gabapentin 400 milliGRAM(s) Oral three times a day  lamoTRIgine 100 milliGRAM(s) Oral daily  lidocaine   4% Patch 1 Patch Transdermal every 24 hours  metoprolol tartrate 50 milliGRAM(s) Oral two times a day  midodrine 10 milliGRAM(s) Oral every 8 hours  pantoprazole  Injectable 40 milliGRAM(s) IV Push daily  potassium phosphate / sodium phosphate Powder (PHOS-NaK) 1 Packet(s) Oral three times a day before meals  QUEtiapine 250 milliGRAM(s) Oral at bedtime    PRN MEDICATIONS  acetaminophen     Tablet .. 650 milliGRAM(s) Oral every 6 hours PRN  acetaminophen  Suppository .. 650 milliGRAM(s) Rectal every 8 hours PRN  LORazepam     Tablet 0.5 milliGRAM(s) Oral two times a day PRN    VITALS:   T(F): 97.2  HR: 65  BP: 130/59  RR: 19  SpO2: 99%    LABS:                        11.6   7.69  )-----------( 218      ( 26 Oct 2021 06:07 )             36.5     10-25    144  |  106  |  21<H>  ----------------------------<  99  3.6   |  25  |  0.8    Ca    7.6<L>      25 Oct 2021 05:50    TPro  5.3<L>  /  Alb  3.1<L>  /  TBili  <0.2  /  DBili  x   /  AST  21  /  ALT  13  /  AlkPhos  99  10-25    PTT - ( 24 Oct 2021 21:39 )  PTT:48.2 sec              Troponin T, Serum: 0.12 ng/mL (10-24-21 @ 05:38)      RADIOLOGY:    PHYSICAL EXAM:  GENERAL: NAD, well-groomed, well-developed  HEAD:  NCAT  EYES: EOMI, PERRLA, conjunctiva clear  ENMT: No tonsillar erythema, exudates, or enlargement; Moist mucous membranes, Good dentition, No lesions  NECK: Supple, No JVD, Normal thyroid  NERVOUS SYSTEM: AAOX4, Good concentration;   CHEST/LUNG: decreased breath sounds bilaterally  HEART: +s1s2 RRR no m/g/r  ABDOMEN: soft, NT/ND (+) bs, no HSM  EXTREMITIES:  2+ Peripheral Pulses, No c/c/e  LYMPH: No lymphadenopathy noted  SKIN: No rashes or lesions

## 2021-10-26 NOTE — PROGRESS NOTE ADULT - ASSESSMENT
Patient on o2. Appreciate cardiac cath. Medical rx. Lying flat.  Would d/c kat. Try ambulate on o2. Check k. Correct k if needed

## 2021-10-26 NOTE — PROGRESS NOTE ADULT - SUBJECTIVE AND OBJECTIVE BOX
SHAUN COATES  76y, Female  Allergy: codeine (Other (Moderate))  Depakote (Unknown)  Dilaudid (Short breath; Rash)  IV Contrast (Anaphylaxis)  losartan (Angioedema)  Risperdal (Other)  verapamil (Short breath; Angioedema)      LOS  5d    CHIEF COMPLAINT: Difficulty Breathing (26 Oct 2021 07:38)      INTERVAL EVENTS/HPI  - No acute events overnight  - T(F): , Max: 97.7 (10-25-21 @ 15:05)  - Denies any worsening symptoms  - Tolerating medication  - WBC Count: 7.69 (10-26-21 @ 06:07)  WBC Count: 7.83 (10-25-21 @ 05:50)     - Creatinine, Serum: 0.8 (10-26-21 @ 06:07)  Creatinine, Serum: 0.8 (10-25-21 @ 05:50)       ROS  General: Denies rigors, nightsweats  HEENT: Denies headache, rhinorrhea, sore throat, eye pain  CV: Denies CP, palpitations  PULM: Denies wheezing, hemoptysis  GI: Denies hematemesis, hematochezia, melena  : Denies discharge, hematuria  MSK: Denies arthralgias, myalgias  SKIN: Denies rash, lesions  NEURO: Denies paresthesias, weakness  PSYCH: Denies depression, anxiety    VITALS:  T(F): 97.2, Max: 97.7 (10-25-21 @ 15:05)  HR: 65  BP: 130/59  RR: 19Vital Signs Last 24 Hrs  T(C): 36.2 (26 Oct 2021 07:01), Max: 36.5 (25 Oct 2021 15:05)  T(F): 97.2 (26 Oct 2021 07:01), Max: 97.7 (25 Oct 2021 15:05)  HR: 65 (26 Oct 2021 07:09) (65 - 84)  BP: 130/59 (26 Oct 2021 07:09) (126/69 - 162/79)  BP(mean): 85 (26 Oct 2021 07:09) (85 - 112)  RR: 19 (26 Oct 2021 07:09) (16 - 35)  SpO2: 99% (26 Oct 2021 07:09) (96% - 100%)    PHYSICAL EXAM:  Gen: NAD, resting in bed  HEENT: Normocephalic, atraumatic  Neck: supple, no lymphadenopathy  CV: Regular rate & regular rhythm  Lungs: decreased BS at bases, no fremitus  Abdomen: Soft, BS present  Ext: Warm, well perfused  Neuro: non focal, awake  Skin: no rash, no erythema  Lines: no phlebitis    FH: Non-contributory  Social Hx: Non-contributory    TESTS & MEASUREMENTS:                        11.6   7.69  )-----------( 218      ( 26 Oct 2021 06:07 )             36.5     10-26    142  |  104  |  27<H>  ----------------------------<  104<H>  4.2   |  26  |  0.8    Ca    7.8<L>      26 Oct 2021 06:07  Mg     2.1     10-26    TPro  5.8<L>  /  Alb  3.3<L>  /  TBili  <0.2  /  DBili  x   /  AST  25  /  ALT  19  /  AlkPhos  103  10-26    eGFR if Non African American: 73 mL/min/1.73M2 (10-26-21 @ 06:07)  eGFR if : 84 mL/min/1.73M2 (10-26-21 @ 06:07)    LIVER FUNCTIONS - ( 26 Oct 2021 06:07 )  Alb: 3.3 g/dL / Pro: 5.8 g/dL / ALK PHOS: 103 U/L / ALT: 19 U/L / AST: 25 U/L / GGT: x               Culture - Urine (collected 10-21-21 @ 14:44)  Source: Clean Catch Clean Catch (Midstream)  Final Report (10-25-21 @ 11:01):    10,000 - 49,000 CFU/mL Escherichia coli  Organism: Escherichia coli (10-25-21 @ 11:01)  Organism: Escherichia coli (10-25-21 @ 11:01)      -  Amikacin: S <=16      -  Amoxicillin/Clavulanic Acid: S <=8/4      -  Ampicillin: S <=8 These ampicillin results predict results for amoxicillin      -  Ampicillin/Sulbactam: S <=4/2 Enterobacter, Citrobacter, and Serratia may develop resistance during prolonged therapy (3-4 days)      -  Aztreonam: S <=4      -  Cefazolin: S <=2 (MIC_CL_COM_ENTERIC_CEFAZU) For uncomplicated UTI with K. pneumoniae, E. coli, or P. mirablis: MARCELLO <=16 is sensitive and MARCELLO >=32 is resistant. This also predicts results for oral agents cefaclor, cefdinir, cefpodoxime, cefprozil, cefuroxime axetil, cephalexin and locarbef for uncomplicated UTI. Note that some isolates may be susceptible to these agents while testing resistant to cefazolin.      -  Cefepime: S <=2      -  Cefoxitin: S <=8      -  Ceftriaxone: S <=1 Enterobacter, Citrobacter, and Serratia may develop resistance during prolonged therapy      -  Ciprofloxacin: R >2      -  Ertapenem: S <=0.5      -  Gentamicin: S <=2      -  Imipenem: S <=1      -  Levofloxacin: R >4      -  Meropenem: S <=1      -  Nitrofurantoin: S <=32 Should not be used to treat pyelonephritis      -  Piperacillin/Tazobactam: S <=8      -  Tigecycline: S <=2      -  Tobramycin: S <=2      -  Trimethoprim/Sulfamethoxazole: S <=0.5/9.5      Method Type: MARCELLO    Culture - Blood (collected 10-21-21 @ 13:56)  Source: .Blood Blood  Preliminary Report (10-22-21 @ 22:02):    No growth to date.        Lactate, Blood: 1.4 mmol/L (10-22-21 @ 04:45)  Lactate, Blood: 1.3 mmol/L (10-21-21 @ 13:56)      INFECTIOUS DISEASES TESTING  Rapid RVP Result: NotDetec (10-22-21 @ 13:45)  Procalcitonin, Serum: 0.80 (10-22-21 @ 04:45)  COVID-19 PCR: NotDetec (10-21-21 @ 13:30)  COVID-19 PCR: NotDetec (01-08-21 @ 16:50)  Procalcitonin, Serum: 6.12 (12-30-20 @ 04:30)  Procalcitonin, Serum: 0.31 (12-29-20 @ 21:31)  Procalcitonin, Serum: 0.33 (12-28-20 @ 16:00)  MRSA PCR Result.: Negative (12-28-20 @ 14:33)  Procalcitonin, Serum: 1.09 (12-25-20 @ 06:34)  Procalcitonin, Serum: 0.31 (12-24-20 @ 22:52)  COVID-19 PCR: Detected (12-23-20 @ 04:50)  Procalcitonin, Serum: 0.84 (12-22-20 @ 21:56)  Procalcitonin, Serum: 0.10 (12-11-20 @ 08:14)  COVID-19 PCR: Detected (12-07-20 @ 12:30)  Procalcitonin, Serum: 0.09 (12-07-20 @ 10:05)  Procalcitonin, Serum: 0.09 (11-29-20 @ 21:30)  COVID-19 PCR: NotDetec (11-29-20 @ 13:30)  Procalcitonin, Serum: 0.10 (11-29-20 @ 13:15)      INFLAMMATORY MARKERS      RADIOLOGY & ADDITIONAL TESTS:  I have personally reviewed the last available Chest xray  CXR      CT      CARDIOLOGY TESTING  12 Lead ECG:   Ventricular Rate 67 BPM    Atrial Rate 67 BPM    P-R Interval 140 ms    QRS Duration 90 ms    Q-T Interval 472 ms    QTC Calculation(Bazett) 498 ms    P Axis 36 degrees    R Axis 14 degrees    T Axis 26 degrees    Diagnosis Line *** Poor data quality, interpretation may be adversely affected  Sinus rhythm with Premature supraventricular complexes  Possible Left atrial enlargement  Nonspecific ST abnormality  Abnormal ECG    Confirmed by VIVIENNE HAQUE MD ([id number]) on 10/24/2021 10:48:20 AM (10-24-21 @ 07:52)  12 Lead ECG:   Ventricular Rate 120 BPM    Atrial Rate 88 BPM    QRS Duration 86 ms    Q-T Interval 316 ms    QTC Calculation(Bazett) 446 ms    R Axis 4 degrees    T Axis 16 degrees    Diagnosis Line Atrial fibrillation with rapid ventricular response  Nonspecific ST abnormality  Abnormal ECG    Confirmed by VIVIENNE HAQUE MD ([id number]) on 10/23/2021 11:05:42 AM (10-23-21 @ 10:16)      MEDICATIONS  albuterol/ipratropium for Nebulization 3 Nebulizer every 6 hours  apixaban 5 Oral every 12 hours  aspirin  chewable 81 Oral daily  atorvastatin 20 Oral at bedtime  azithromycin  IVPB     azithromycin  IVPB 500 IV Intermittent every 24 hours  cefTRIAXone   IVPB     cefTRIAXone   IVPB 1000 IV Intermittent every 24 hours  chlorhexidine 4% Liquid 1 Topical two times a day  chlorhexidine 4% Liquid 1 Topical <User Schedule>  gabapentin 400 Oral three times a day  lamoTRIgine 100 Oral daily  lidocaine   4% Patch 1 Transdermal every 24 hours  metoprolol tartrate 50 Oral two times a day  midodrine 10 Oral every 8 hours  pantoprazole  Injectable 40 IV Push daily  potassium phosphate / sodium phosphate Powder (PHOS-NaK) 1 Oral three times a day before meals  QUEtiapine 250 Oral at bedtime      WEIGHT  Weight (kg): 91.2 (10-22-21 @ 00:03)  Creatinine, Serum: 0.8 mg/dL (10-26-21 @ 06:07)      ANTIBIOTICS:  azithromycin  IVPB      azithromycin  IVPB 500 milliGRAM(s) IV Intermittent every 24 hours  cefTRIAXone   IVPB      cefTRIAXone   IVPB 1000 milliGRAM(s) IV Intermittent every 24 hours      All available historical records have been reviewed       SHAUN COATES  76y, Female  Allergy: codeine (Other (Moderate))  Depakote (Unknown)  Dilaudid (Short breath; Rash)  IV Contrast (Anaphylaxis)  losartan (Angioedema)  Risperdal (Other)  verapamil (Short breath; Angioedema)      LOS  5d    CHIEF COMPLAINT: Difficulty Breathing (26 Oct 2021 07:38)      INTERVAL EVENTS/HPI  - No acute events overnight  - T(F): , Max: 97.7 (10-25-21 @ 15:05)  - Denies any worsening symptoms; breathing improved  - Tolerating medication  - WBC Count: 7.69 (10-26-21 @ 06:07)  WBC Count: 7.83 (10-25-21 @ 05:50)     - Creatinine, Serum: 0.8 (10-26-21 @ 06:07)  Creatinine, Serum: 0.8 (10-25-21 @ 05:50)       ROS  General: Denies rigors, nightsweats  HEENT: Denies headache, rhinorrhea, sore throat, eye pain  CV: Denies CP, palpitations  PULM: Denies wheezing, hemoptysis  GI: Denies hematemesis, hematochezia, melena  : Denies discharge, hematuria  MSK: Denies arthralgias, myalgias  SKIN: Denies rash, lesions  NEURO: Denies paresthesias, weakness  PSYCH: Denies depression, anxiety    VITALS:  T(F): 97.2, Max: 97.7 (10-25-21 @ 15:05)  HR: 65  BP: 130/59  RR: 19Vital Signs Last 24 Hrs  T(C): 36.2 (26 Oct 2021 07:01), Max: 36.5 (25 Oct 2021 15:05)  T(F): 97.2 (26 Oct 2021 07:01), Max: 97.7 (25 Oct 2021 15:05)  HR: 65 (26 Oct 2021 07:09) (65 - 84)  BP: 130/59 (26 Oct 2021 07:09) (126/69 - 162/79)  BP(mean): 85 (26 Oct 2021 07:09) (85 - 112)  RR: 19 (26 Oct 2021 07:09) (16 - 35)  SpO2: 99% (26 Oct 2021 07:09) (96% - 100%)    PHYSICAL EXAM:  Gen: NAD, resting in bed  HEENT: Normocephalic, atraumatic  Neck: supple, no lymphadenopathy  CV: Regular rate & regular rhythm  Lungs: decreased BS at bases, no fremitus  Abdomen: Soft, BS present  Ext: Warm, well perfused  Neuro: non focal, awake  Skin: no rash, no erythema  Lines: no phlebitis    FH: Non-contributory  Social Hx: Non-contributory    TESTS & MEASUREMENTS:                        11.6   7.69  )-----------( 218      ( 26 Oct 2021 06:07 )             36.5     10-26    142  |  104  |  27<H>  ----------------------------<  104<H>  4.2   |  26  |  0.8    Ca    7.8<L>      26 Oct 2021 06:07  Mg     2.1     10-26    TPro  5.8<L>  /  Alb  3.3<L>  /  TBili  <0.2  /  DBili  x   /  AST  25  /  ALT  19  /  AlkPhos  103  10-26    eGFR if Non African American: 73 mL/min/1.73M2 (10-26-21 @ 06:07)  eGFR if : 84 mL/min/1.73M2 (10-26-21 @ 06:07)    LIVER FUNCTIONS - ( 26 Oct 2021 06:07 )  Alb: 3.3 g/dL / Pro: 5.8 g/dL / ALK PHOS: 103 U/L / ALT: 19 U/L / AST: 25 U/L / GGT: x               Culture - Urine (collected 10-21-21 @ 14:44)  Source: Clean Catch Clean Catch (Midstream)  Final Report (10-25-21 @ 11:01):    10,000 - 49,000 CFU/mL Escherichia coli  Organism: Escherichia coli (10-25-21 @ 11:01)  Organism: Escherichia coli (10-25-21 @ 11:01)      -  Amikacin: S <=16      -  Amoxicillin/Clavulanic Acid: S <=8/4      -  Ampicillin: S <=8 These ampicillin results predict results for amoxicillin      -  Ampicillin/Sulbactam: S <=4/2 Enterobacter, Citrobacter, and Serratia may develop resistance during prolonged therapy (3-4 days)      -  Aztreonam: S <=4      -  Cefazolin: S <=2 (MIC_CL_COM_ENTERIC_CEFAZU) For uncomplicated UTI with K. pneumoniae, E. coli, or P. mirablis: MARCELLO <=16 is sensitive and MARCELLO >=32 is resistant. This also predicts results for oral agents cefaclor, cefdinir, cefpodoxime, cefprozil, cefuroxime axetil, cephalexin and locarbef for uncomplicated UTI. Note that some isolates may be susceptible to these agents while testing resistant to cefazolin.      -  Cefepime: S <=2      -  Cefoxitin: S <=8      -  Ceftriaxone: S <=1 Enterobacter, Citrobacter, and Serratia may develop resistance during prolonged therapy      -  Ciprofloxacin: R >2      -  Ertapenem: S <=0.5      -  Gentamicin: S <=2      -  Imipenem: S <=1      -  Levofloxacin: R >4      -  Meropenem: S <=1      -  Nitrofurantoin: S <=32 Should not be used to treat pyelonephritis      -  Piperacillin/Tazobactam: S <=8      -  Tigecycline: S <=2      -  Tobramycin: S <=2      -  Trimethoprim/Sulfamethoxazole: S <=0.5/9.5      Method Type: MARCELLO    Culture - Blood (collected 10-21-21 @ 13:56)  Source: .Blood Blood  Preliminary Report (10-22-21 @ 22:02):    No growth to date.        Lactate, Blood: 1.4 mmol/L (10-22-21 @ 04:45)  Lactate, Blood: 1.3 mmol/L (10-21-21 @ 13:56)      INFECTIOUS DISEASES TESTING  Rapid RVP Result: NotDetec (10-22-21 @ 13:45)  Procalcitonin, Serum: 0.80 (10-22-21 @ 04:45)  COVID-19 PCR: NotDetec (10-21-21 @ 13:30)  COVID-19 PCR: NotDetec (01-08-21 @ 16:50)  Procalcitonin, Serum: 6.12 (12-30-20 @ 04:30)  Procalcitonin, Serum: 0.31 (12-29-20 @ 21:31)  Procalcitonin, Serum: 0.33 (12-28-20 @ 16:00)  MRSA PCR Result.: Negative (12-28-20 @ 14:33)  Procalcitonin, Serum: 1.09 (12-25-20 @ 06:34)  Procalcitonin, Serum: 0.31 (12-24-20 @ 22:52)  COVID-19 PCR: Detected (12-23-20 @ 04:50)  Procalcitonin, Serum: 0.84 (12-22-20 @ 21:56)  Procalcitonin, Serum: 0.10 (12-11-20 @ 08:14)  COVID-19 PCR: Detected (12-07-20 @ 12:30)  Procalcitonin, Serum: 0.09 (12-07-20 @ 10:05)  Procalcitonin, Serum: 0.09 (11-29-20 @ 21:30)  COVID-19 PCR: NotDetec (11-29-20 @ 13:30)  Procalcitonin, Serum: 0.10 (11-29-20 @ 13:15)      INFLAMMATORY MARKERS      RADIOLOGY & ADDITIONAL TESTS:  I have personally reviewed the last available Chest xray  CXR      CT      CARDIOLOGY TESTING  12 Lead ECG:   Ventricular Rate 67 BPM    Atrial Rate 67 BPM    P-R Interval 140 ms    QRS Duration 90 ms    Q-T Interval 472 ms    QTC Calculation(Bazett) 498 ms    P Axis 36 degrees    R Axis 14 degrees    T Axis 26 degrees    Diagnosis Line *** Poor data quality, interpretation may be adversely affected  Sinus rhythm with Premature supraventricular complexes  Possible Left atrial enlargement  Nonspecific ST abnormality  Abnormal ECG    Confirmed by VIVIENNE HAQUE MD ([id number]) on 10/24/2021 10:48:20 AM (10-24-21 @ 07:52)  12 Lead ECG:   Ventricular Rate 120 BPM    Atrial Rate 88 BPM    QRS Duration 86 ms    Q-T Interval 316 ms    QTC Calculation(Bazett) 446 ms    R Axis 4 degrees    T Axis 16 degrees    Diagnosis Line Atrial fibrillation with rapid ventricular response  Nonspecific ST abnormality  Abnormal ECG    Confirmed by VIVIENNE HAQUE MD ([id number]) on 10/23/2021 11:05:42 AM (10-23-21 @ 10:16)      MEDICATIONS  albuterol/ipratropium for Nebulization 3 Nebulizer every 6 hours  apixaban 5 Oral every 12 hours  aspirin  chewable 81 Oral daily  atorvastatin 20 Oral at bedtime  azithromycin  IVPB     azithromycin  IVPB 500 IV Intermittent every 24 hours  cefTRIAXone   IVPB     cefTRIAXone   IVPB 1000 IV Intermittent every 24 hours  chlorhexidine 4% Liquid 1 Topical two times a day  chlorhexidine 4% Liquid 1 Topical <User Schedule>  gabapentin 400 Oral three times a day  lamoTRIgine 100 Oral daily  lidocaine   4% Patch 1 Transdermal every 24 hours  metoprolol tartrate 50 Oral two times a day  midodrine 10 Oral every 8 hours  pantoprazole  Injectable 40 IV Push daily  potassium phosphate / sodium phosphate Powder (PHOS-NaK) 1 Oral three times a day before meals  QUEtiapine 250 Oral at bedtime      WEIGHT  Weight (kg): 91.2 (10-22-21 @ 00:03)  Creatinine, Serum: 0.8 mg/dL (10-26-21 @ 06:07)      ANTIBIOTICS:  azithromycin  IVPB      azithromycin  IVPB 500 milliGRAM(s) IV Intermittent every 24 hours  cefTRIAXone   IVPB      cefTRIAXone   IVPB 1000 milliGRAM(s) IV Intermittent every 24 hours      All available historical records have been reviewed

## 2021-10-26 NOTE — PROGRESS NOTE ADULT - ASSESSMENT
75 y/o PMHx COPD on home o2, paroxysmal A-Fib, HFpEF,  CVA,  HTN, s/p Carotid arthroplasty and Covid-19 in January, subsequently vaccinated.  Pt presented to the ED today with SOB and fever     sepsis secondary to UTI vs community acquired pneumonia / acute on chronic HFpEF - resolved /  type 2 NSTEMI      - s/p cardiac cath  -> non obstructive  CAD   - complete  antibiotic course - check urine Legionella as per ID    - aspirin, Lipitor, metoprolol   - would DC Plavix and  start Eliquis    - resume home psych meds   - ambulate    - DC planning

## 2021-10-26 NOTE — PROGRESS NOTE ADULT - SUBJECTIVE AND OBJECTIVE BOX
Patient is a 76y old  Female who presents with a chief complaint of Difficulty Breathing (26 Oct 2021 07:24)      T(F): 97.2 (10-26-21 @ 07:01), Max: 97.7 (10-25-21 @ 15:05)  HR: 75 (10-26-21 @ 05:43)  BP: 144/72 (10-26-21 @ 05:43)  RR: 24 (10-26-21 @ 07:01)  SpO2: 96% (10-26-21 @ 05:43) (96% - 100%)    PHYSICAL EXAM:  GENERAL: NAD, well-groomed, well-developed  HEAD:  Atraumatic, Normocephalic  EYES: EOMI, PERRLA, conjunctiva and sclera clear  ENMT: No tonsillar erythema, exudates, or enlargement; Moist mucous membranes, Good dentition, No lesions  NECK: Supple, No JVD, Normal thyroid  NERVOUS SYSTEM:  Alert & Oriented X3,  Motor Strength 5/5 B/L upper and lower extremities  CHEST/LUNG: Clear to percussion bilaterally; No rales, rhonchi, wheezing, or rubs  HEART: Regular rate and rhythm; No murmurs, rubs, or gallops  ABDOMEN: Soft, Nontender, Nondistended; Bowel sounds present  EXTREMITIES:   No clubbing, cyanosis, or edema  LYMPH: No lymphadenopathy noted  SKIN: No rashes or lesions    labs  10-25    144  |  106  |  21<H>  ----------------------------<  99  3.6   |  25  |  0.8    Ca    7.6<L>      25 Oct 2021 05:50    TPro  5.3<L>  /  Alb  3.1<L>  /  TBili  <0.2  /  DBili  x   /  AST  21  /  ALT  13  /  AlkPhos  99  10-25                          11.6   7.69  )-----------( 218      ( 26 Oct 2021 06:07 )             36.5       PTT - ( 24 Oct 2021 21:39 )  PTT:48.2 sec        acetaminophen     Tablet .. 650 milliGRAM(s) Oral every 6 hours PRN  acetaminophen  Suppository .. 650 milliGRAM(s) Rectal every 8 hours PRN  albuterol/ipratropium for Nebulization 3 milliLiter(s) Nebulizer every 6 hours  aspirin  chewable 81 milliGRAM(s) Oral daily  atorvastatin 20 milliGRAM(s) Oral at bedtime  azithromycin  IVPB      azithromycin  IVPB 500 milliGRAM(s) IV Intermittent every 24 hours  cefTRIAXone   IVPB      cefTRIAXone   IVPB 1000 milliGRAM(s) IV Intermittent every 24 hours  chlorhexidine 4% Liquid 1 Application(s) Topical two times a day  chlorhexidine 4% Liquid 1 Application(s) Topical <User Schedule>  clopidogrel Tablet 75 milliGRAM(s) Oral daily  gabapentin 400 milliGRAM(s) Oral three times a day  lamoTRIgine 100 milliGRAM(s) Oral daily  lidocaine   4% Patch 1 Patch Transdermal every 24 hours  LORazepam     Tablet 0.5 milliGRAM(s) Oral two times a day PRN  metoprolol tartrate 50 milliGRAM(s) Oral two times a day  midodrine 10 milliGRAM(s) Oral every 8 hours  pantoprazole  Injectable 40 milliGRAM(s) IV Push daily  potassium phosphate / sodium phosphate Powder (PHOS-NaK) 1 Packet(s) Oral three times a day before meals  QUEtiapine 250 milliGRAM(s) Oral at bedtime

## 2021-10-26 NOTE — PHYSICAL THERAPY INITIAL EVALUATION ADULT - ADDITIONAL COMMENTS
Pt lives in a private house with her spouse - 3 steps with rail on the outside to enter the house, then she stays on 1st level. Pt states there are 10 steps down to basement. Pt ambulates inside house without AD, but uses SC or RW as needed in the community.

## 2021-10-26 NOTE — PHYSICAL THERAPY INITIAL EVALUATION ADULT - GENERAL OBSERVATIONS, REHAB EVAL
Pt encountered sitting in chair bedside, NAD, all needs met, +tele, on 4 L02/min via NC, ok to be seen by PT as confirmed by RN and pt agreeable. +chart reviewed

## 2021-10-26 NOTE — PROGRESS NOTE ADULT - ASSESSMENT
A 77 yo PMHx COPD on home o2, A-Fib, CVA hx on aspirin and Plavix, Emphysema, HTN, s/p Carotid artheroplasty and Covid-19 in 1/2021 who presents with fever and left calf pain.       # Sepsis on admission likely secondary to suspected GNR PNA/UTI  # Chronic Atrial Fibrillation with RVR - Rate controlled now   # NSTEMI   # acute on chronic diastolic CHF on IV lasix  # COPD exacerbation.   # Leg pain, negative duplex   # Incidental renal cystic lesion need MRI as outpatient    - f/u Urine and blood cultures, urine legionella and strep sent   - Procal 0.8  - c/w ceftriaxone and azithromycin   - PRN IV Lopressor   - s/p cath 10/25: moderate CAD  -anticoag to be resumed tomorrow  - negative CT abdomen.  - c/w  metoprolol 50mg BID for rate control  - po lasix , I<O, fluid restriction   - Serial ECG  - ECHO:   Left ventricular ejection fraction, by visual estimation, is 55 to 60%.   2. Mild left ventricular hypertrophy.   3. There is no evidence of pericardial effusion.  -Refused all interventions for NSTEMI and AS  -will mobilize and d/c to tele  - outpt f/u for renal cyst

## 2021-10-27 LAB
ALBUMIN SERPL ELPH-MCNC: 3.1 G/DL — LOW (ref 3.5–5.2)
ALP SERPL-CCNC: 91 U/L — SIGNIFICANT CHANGE UP (ref 30–115)
ALT FLD-CCNC: 24 U/L — SIGNIFICANT CHANGE UP (ref 0–41)
ANION GAP SERPL CALC-SCNC: 11 MMOL/L — SIGNIFICANT CHANGE UP (ref 7–14)
AST SERPL-CCNC: 32 U/L — SIGNIFICANT CHANGE UP (ref 0–41)
BILIRUB SERPL-MCNC: <0.2 MG/DL — SIGNIFICANT CHANGE UP (ref 0.2–1.2)
BUN SERPL-MCNC: 26 MG/DL — HIGH (ref 10–20)
CALCIUM SERPL-MCNC: 7.7 MG/DL — LOW (ref 8.5–10.1)
CHLORIDE SERPL-SCNC: 105 MMOL/L — SIGNIFICANT CHANGE UP (ref 98–110)
CO2 SERPL-SCNC: 25 MMOL/L — SIGNIFICANT CHANGE UP (ref 17–32)
CREAT SERPL-MCNC: 0.8 MG/DL — SIGNIFICANT CHANGE UP (ref 0.7–1.5)
GLUCOSE SERPL-MCNC: 78 MG/DL — SIGNIFICANT CHANGE UP (ref 70–99)
LEGIONELLA AG UR QL: NEGATIVE — SIGNIFICANT CHANGE UP
POTASSIUM SERPL-MCNC: 3.9 MMOL/L — SIGNIFICANT CHANGE UP (ref 3.5–5)
POTASSIUM SERPL-SCNC: 3.9 MMOL/L — SIGNIFICANT CHANGE UP (ref 3.5–5)
PROT SERPL-MCNC: 5.1 G/DL — LOW (ref 6–8)
SODIUM SERPL-SCNC: 141 MMOL/L — SIGNIFICANT CHANGE UP (ref 135–146)

## 2021-10-27 PROCEDURE — 99233 SBSQ HOSP IP/OBS HIGH 50: CPT

## 2021-10-27 PROCEDURE — 99232 SBSQ HOSP IP/OBS MODERATE 35: CPT

## 2021-10-27 RX ORDER — METOPROLOL TARTRATE 50 MG
50 TABLET ORAL
Refills: 0 | Status: DISCONTINUED | OUTPATIENT
Start: 2021-10-27 | End: 2021-10-27

## 2021-10-27 RX ORDER — METOPROLOL TARTRATE 50 MG
50 TABLET ORAL EVERY 6 HOURS
Refills: 0 | Status: DISCONTINUED | OUTPATIENT
Start: 2021-10-27 | End: 2021-10-28

## 2021-10-27 RX ADMIN — APIXABAN 5 MILLIGRAM(S): 2.5 TABLET, FILM COATED ORAL at 06:08

## 2021-10-27 RX ADMIN — Medication 50 MILLIGRAM(S): at 17:40

## 2021-10-27 RX ADMIN — LIDOCAINE 1 PATCH: 4 CREAM TOPICAL at 08:07

## 2021-10-27 RX ADMIN — MIDODRINE HYDROCHLORIDE 10 MILLIGRAM(S): 2.5 TABLET ORAL at 13:37

## 2021-10-27 RX ADMIN — ATORVASTATIN CALCIUM 20 MILLIGRAM(S): 80 TABLET, FILM COATED ORAL at 21:07

## 2021-10-27 RX ADMIN — APIXABAN 5 MILLIGRAM(S): 2.5 TABLET, FILM COATED ORAL at 17:40

## 2021-10-27 RX ADMIN — Medication 650 MILLIGRAM(S): at 17:41

## 2021-10-27 RX ADMIN — LIDOCAINE 1 PATCH: 4 CREAM TOPICAL at 17:40

## 2021-10-27 RX ADMIN — LAMOTRIGINE 150 MILLIGRAM(S): 25 TABLET, ORALLY DISINTEGRATING ORAL at 12:07

## 2021-10-27 RX ADMIN — Medication 3 MILLILITER(S): at 16:27

## 2021-10-27 RX ADMIN — Medication 3 MILLILITER(S): at 08:36

## 2021-10-27 RX ADMIN — Medication 1 PACKET(S): at 11:43

## 2021-10-27 RX ADMIN — GABAPENTIN 400 MILLIGRAM(S): 400 CAPSULE ORAL at 06:07

## 2021-10-27 RX ADMIN — GABAPENTIN 400 MILLIGRAM(S): 400 CAPSULE ORAL at 13:37

## 2021-10-27 RX ADMIN — GABAPENTIN 400 MILLIGRAM(S): 400 CAPSULE ORAL at 21:07

## 2021-10-27 RX ADMIN — QUETIAPINE FUMARATE 250 MILLIGRAM(S): 200 TABLET, FILM COATED ORAL at 21:08

## 2021-10-27 RX ADMIN — Medication 3 MILLILITER(S): at 02:54

## 2021-10-27 RX ADMIN — Medication 50 MILLIGRAM(S): at 06:07

## 2021-10-27 RX ADMIN — Medication 0.5 MILLIGRAM(S): at 22:35

## 2021-10-27 RX ADMIN — CHLORHEXIDINE GLUCONATE 1 APPLICATION(S): 213 SOLUTION TOPICAL at 06:07

## 2021-10-27 RX ADMIN — CEFTRIAXONE 100 MILLIGRAM(S): 500 INJECTION, POWDER, FOR SOLUTION INTRAMUSCULAR; INTRAVENOUS at 22:36

## 2021-10-27 RX ADMIN — MIDODRINE HYDROCHLORIDE 10 MILLIGRAM(S): 2.5 TABLET ORAL at 21:07

## 2021-10-27 RX ADMIN — Medication 1 PACKET(S): at 06:08

## 2021-10-27 RX ADMIN — Medication 81 MILLIGRAM(S): at 12:07

## 2021-10-27 RX ADMIN — Medication 3 MILLILITER(S): at 20:41

## 2021-10-27 RX ADMIN — AZITHROMYCIN 255 MILLIGRAM(S): 500 TABLET, FILM COATED ORAL at 22:36

## 2021-10-27 RX ADMIN — Medication 650 MILLIGRAM(S): at 18:11

## 2021-10-27 RX ADMIN — MIDODRINE HYDROCHLORIDE 10 MILLIGRAM(S): 2.5 TABLET ORAL at 06:07

## 2021-10-27 RX ADMIN — LIDOCAINE 1 PATCH: 4 CREAM TOPICAL at 19:41

## 2021-10-27 RX ADMIN — Medication 0.5 MILLIGRAM(S): at 12:17

## 2021-10-27 RX ADMIN — Medication 1 PACKET(S): at 17:40

## 2021-10-27 RX ADMIN — Medication 50 MILLIGRAM(S): at 12:17

## 2021-10-27 NOTE — PROGRESS NOTE ADULT - ASSESSMENT
ASSESSMENT  77 y/o PMHx COPD on home o2, A-Fib, CVA hx on aspirin and Plavix, Emphysema, HTN, s/p Carotid artheroplasty and Covid-19 in 1/2021 who presents with fevers and left calf pain      IMPRESSION  #Septic Shock - CAP vs UTI   - Urine Cx E coli   - Procalcitonin, Serum: 0.80  (10.22.21 @ 04:45)  - CT Abdomen and Pelvis No Cont (10.22.21 @ 21:48): New, small bibasilar pleural effusions, right greater than left with associated atelectasis. New hazy stranding of fat in the anterior left lower quadrant pelvic wall of uncertain significance; clinical correlation for any history of trauma, etc. is recommended. Post-cholecystectomy No retroperitoneal hematoma is seen. No evidence of bowel obstruction.    #Atrial Fibrillation with RVR  #Bilateral Leg Pain   #COPD  #Hx of CVA  #Obesity BMI (kg/m2): 36.8  #DM    #Abx allergy: codeine (Other (Moderate))  Depakote (Unknown)  Dilaudid (Short breath; Rash)  IV Contrast (Anaphylaxis)  losartan (Angioedema)  Risperdal (Other)  verapamil (Short breath; Angioedema)    RECOMMENDATIONS  - continue ceftriaxone 1g daily - plan for 7 day course (end date 10/28)  - can stop azithromycin after today's dose     Please call or message on [employer] Teams if with any questions.  Spectra [phone]

## 2021-10-27 NOTE — PROGRESS NOTE ADULT - ASSESSMENT
75 y/o PMHx COPD on home o2, paroxysmal A-Fib, HFpEF,  CVA,  HTN, s/p Carotid arthroplasty and Covid-19 in January, subsequently vaccinated.  Pt presented to the ED today with SOB and fever     sepsis secondary to UTI vs community acquired pneumonia / acute on chronic HFpEF - resolved /  type 2 NSTEMI      - s/p cardiac cath  -> non obstructive  CAD   - complete  antibiotic course tomorrow as per ID   - aspirin, Lipitor, metoprolol   -  Plavix DC'd.. started on  Eliquis    - continue home psych meds   - ambulate    - DC planning

## 2021-10-27 NOTE — PROGRESS NOTE ADULT - ASSESSMENT
A 75 yo PMHx COPD on home o2, A-Fib, CVA hx on aspirin and Plavix, Emphysema, HTN, s/p Carotid arthroplasty and Covid-19 in 1/2021 who presents with fever and left calf pain.     # Sepsis on admission likely secondary to suspected GNR PNA/UTI  # COPD exacerbation.   - Positive UA, Procal 0.8  - Continue with Rocephin for 7 days  - End date for Rocephin tomorrow  - Continue with Azithromycin, end date today    # Chronic Atrial Fibrillation with RVR - Rate controlled now   - Continue Eliquis  - Continue Metoprolol 60 q6  - Will switch to 100 twice daily tomorrow    # NSTEMI   # acute on chronic diastolic CHF  - No intervention on cath  - Echo noted  - Continue with Plavix and Eliquis on discharge    # Incidental renal cystic lesion   - need MRI as outpatient    DVT: Eliquis  Dispo: likely D/C in AM

## 2021-10-27 NOTE — PROGRESS NOTE ADULT - SUBJECTIVE AND OBJECTIVE BOX
Patient is a 76y old  Female who presents with a chief complaint of Difficulty Breathing (26 Oct 2021 08:01)      T(F): 97.2 (10-27-21 @ 07:01), Max: 97.9 (10-26-21 @ 23:19)  HR: 75 (10-26-21 @ 23:19)  BP: 121/59 (10-26-21 @ 23:19)  RR: 18 (10-27-21 @ 07:01)  SpO2: 96% (10-26-21 @ 14:15) (96% - 97%)    PHYSICAL EXAM:  GENERAL: NAD, well-groomed, well-developed  HEAD:  Atraumatic, Normocephalic  EYES: EOMI, PERRLA, conjunctiva and sclera clear  ENMT: No tonsillar erythema, exudates, or enlargement; Moist mucous membranes, Good dentition, No lesions  NECK: Supple, No JVD, Normal thyroid  NERVOUS SYSTEM:  Alert & Oriented X3,  Motor Strength 5/5 B/L upper and lower extremities  CHEST/LUNG: Clear to percussion bilaterally; No rales, rhonchi, wheezing, or rubs  HEART: Regular rate and rhythm; No murmurs, rubs, or gallops  ABDOMEN: Soft, Nontender, Nondistended; Bowel sounds present  EXTREMITIES:   No clubbing, cyanosis, or edema  LYMPH: No lymphadenopathy noted  SKIN: No rashes or lesions    labs  10-26    142  |  104  |  27<H>  ----------------------------<  104<H>  4.2   |  26  |  0.8    Ca    7.8<L>      26 Oct 2021 06:07  Mg     2.1     10-26    TPro  5.8<L>  /  Alb  3.3<L>  /  TBili  <0.2  /  DBili  x   /  AST  25  /  ALT  19  /  AlkPhos  103  10-26                          11.6   7.69  )-----------( 218      ( 26 Oct 2021 06:07 )             36.5               acetaminophen     Tablet .. 650 milliGRAM(s) Oral every 6 hours PRN  acetaminophen  Suppository .. 650 milliGRAM(s) Rectal every 8 hours PRN  albuterol/ipratropium for Nebulization 3 milliLiter(s) Nebulizer every 6 hours  apixaban 5 milliGRAM(s) Oral every 12 hours  aspirin  chewable 81 milliGRAM(s) Oral daily  atorvastatin 20 milliGRAM(s) Oral at bedtime  azithromycin  IVPB      azithromycin  IVPB 500 milliGRAM(s) IV Intermittent every 24 hours  cefTRIAXone   IVPB      cefTRIAXone   IVPB 1000 milliGRAM(s) IV Intermittent every 24 hours  chlorhexidine 4% Liquid 1 Application(s) Topical two times a day  chlorhexidine 4% Liquid 1 Application(s) Topical <User Schedule>  gabapentin 400 milliGRAM(s) Oral three times a day  lamoTRIgine 150 milliGRAM(s) Oral daily  lidocaine   4% Patch 1 Patch Transdermal every 24 hours  LORazepam     Tablet 0.5 milliGRAM(s) Oral two times a day PRN  metoprolol tartrate 50 milliGRAM(s) Oral two times a day  midodrine 10 milliGRAM(s) Oral every 8 hours  potassium phosphate / sodium phosphate Powder (PHOS-NaK) 1 Packet(s) Oral three times a day before meals  QUEtiapine 250 milliGRAM(s) Oral at bedtime

## 2021-10-27 NOTE — PROGRESS NOTE ADULT - SUBJECTIVE AND OBJECTIVE BOX
Patient feels good, no SOB, no chest pain      T(F): 97.2 (10-27-21 @ 07:01), Max: 97.9 (10-26-21 @ 23:19)  HR: 73 (10-27-21 @ 07:08)  BP: 141/65 (10-27-21 @ 07:08)  RR: 18 (10-27-21 @ 07:01)      PHYSICAL EXAM:  GENERAL: NAD  HEAD:  Atraumatic, Normocephalic  EYES: EOMI, PERRLA, conjunctiva and sclera clear  NERVOUS SYSTEM:  Alert & Oriented X3, no focal deficits   CHEST/LUNG: Clear to percussion bilaterally; No rales, rhonchi, wheezing, or rubs  HEART: Regular rate and rhythm; No murmurs, rubs, or gallops  ABDOMEN: Soft, Nontender, Nondistended; Bowel sounds present  EXTREMITIES:  2+ Peripheral Pulses, No clubbing, cyanosis, or edema    LABS  10-27    141  |  105  |  26<H>  ----------------------------<  78  3.9   |  25  |  0.8    Ca    7.7<L>      27 Oct 2021 06:26  Mg     2.1     10-26    TPro  5.1<L>  /  Alb  3.1<L>  /  TBili  <0.2  /  DBili  x   /  AST  32  /  ALT  24  /  AlkPhos  91  10-27                          11.6   7.69  )-----------( 218      ( 26 Oct 2021 06:07 )             36.5       Culture Results:   10,000 - 49,000 CFU/mL Escherichia coli (10-21-21)  Culture Results:   No Growth Final (10-21-21)    RADIOLOGY  < from: Xray Chest 1 View- PORTABLE-Routine (Xray Chest 1 View- PORTABLE-Routine in AM.) (10.26.21 @ 06:07) >    IMPRESSION:    No significant change in bilateral pulmonary opacities.      < end of copied text >    MEDICATIONS  (STANDING):  albuterol/ipratropium for Nebulization 3 milliLiter(s) Nebulizer every 6 hours  apixaban 5 milliGRAM(s) Oral every 12 hours  aspirin  chewable 81 milliGRAM(s) Oral daily  atorvastatin 20 milliGRAM(s) Oral at bedtime  azithromycin  IVPB 500 milliGRAM(s) IV Intermittent every 24 hours  cefTRIAXone   IVPB 1000 milliGRAM(s) IV Intermittent every 24 hours   chlorhexidine 4% Liquid 1 Application(s) Topical two times a day  chlorhexidine 4% Liquid 1 Application(s) Topical <User Schedule>  gabapentin 400 milliGRAM(s) Oral three times a day  lamoTRIgine 150 milliGRAM(s) Oral daily  lidocaine   4% Patch 1 Patch Transdermal every 24 hours  metoprolol tartrate 50 milliGRAM(s) Oral every 6 hours  midodrine 10 milliGRAM(s) Oral every 8 hours  potassium phosphate / sodium phosphate Powder (PHOS-NaK) 1 Packet(s) Oral three times a day before meals  QUEtiapine 250 milliGRAM(s) Oral at bedtime    MEDICATIONS  (PRN):  acetaminophen     Tablet .. 650 milliGRAM(s) Oral every 6 hours PRN Temp greater or equal to 38.5C (101.3F), Moderate Pain (4 - 6)  acetaminophen  Suppository .. 650 milliGRAM(s) Rectal every 8 hours PRN Temp greater or equal to 38C (100.4F)  LORazepam     Tablet 0.5 milliGRAM(s) Oral two times a day PRN Anxiety

## 2021-10-27 NOTE — PROGRESS NOTE ADULT - ASSESSMENT
Patient on o2. Appreciate cardiac cath. Medical rx. Lying flat.   Try ambulate on o2. Check k. Patient was on metoprolol 100 twice a day. Will increase lopressor 50 q 6 . Hold if hr less than 50

## 2021-10-27 NOTE — PROGRESS NOTE ADULT - SUBJECTIVE AND OBJECTIVE BOX
SUBJECTIVE:    Patient is a 76y old Female who presents with a chief complaint of Difficulty Breathing (27 Oct 2021 15:18)    Currently admitted to medicine with the primary diagnosis of Sepsis       Today is hospital day 6d. This morning she is resting comfortably in bed and reports no new issues or overnight events.     INTERVAL EVENTS: Patient comfortable and reports wanting to go home. Discussed with son and appears to be more relaxed.    PAST MEDICAL & SURGICAL HISTORY  Hypertension    Depression    COPD (chronic obstructive pulmonary disease)    Other cardiomyopathy    Other emphysema    PVD (peripheral vascular disease)    Allergic reaction    Bipolar 1 disorder    FLAVIO on CPAP    Transient ischemic attack    Congestive heart failure    Falls    2019 novel coronavirus disease (COVID-19)    Respiratory failure requiring intubation    Afib    History of cholecystectomy    H/O carotid angioplasty        ALLERGIES:  codeine (Other (Moderate))  Depakote (Unknown)  Dilaudid (Short breath; Rash)  IV Contrast (Anaphylaxis)  losartan (Angioedema)  Risperdal (Other)  verapamil (Short breath; Angioedema)    MEDICATIONS:  STANDING MEDICATIONS  albuterol/ipratropium for Nebulization 3 milliLiter(s) Nebulizer every 6 hours  apixaban 5 milliGRAM(s) Oral every 12 hours  aspirin  chewable 81 milliGRAM(s) Oral daily  atorvastatin 20 milliGRAM(s) Oral at bedtime  azithromycin  IVPB      azithromycin  IVPB 500 milliGRAM(s) IV Intermittent every 24 hours  cefTRIAXone   IVPB 1000 milliGRAM(s) IV Intermittent every 24 hours  cefTRIAXone   IVPB      chlorhexidine 4% Liquid 1 Application(s) Topical two times a day  chlorhexidine 4% Liquid 1 Application(s) Topical <User Schedule>  gabapentin 400 milliGRAM(s) Oral three times a day  lamoTRIgine 150 milliGRAM(s) Oral daily  lidocaine   4% Patch 1 Patch Transdermal every 24 hours  metoprolol tartrate 50 milliGRAM(s) Oral every 6 hours  midodrine 10 milliGRAM(s) Oral every 8 hours  potassium phosphate / sodium phosphate Powder (PHOS-NaK) 1 Packet(s) Oral three times a day before meals  QUEtiapine 250 milliGRAM(s) Oral at bedtime    PRN MEDICATIONS  acetaminophen     Tablet .. 650 milliGRAM(s) Oral every 6 hours PRN  acetaminophen  Suppository .. 650 milliGRAM(s) Rectal every 8 hours PRN  LORazepam     Tablet 0.5 milliGRAM(s) Oral two times a day PRN    VITALS:   T(F): 98.1  HR: 73  BP: 92/51  RR: 20  SpO2: 99%    LABS:                        11.6   7.69  )-----------( 218      ( 26 Oct 2021 06:07 )             36.5     10-27    141  |  105  |  26<H>  ----------------------------<  78  3.9   |  25  |  0.8    Ca    7.7<L>      27 Oct 2021 06:26  Mg     2.1     10-26    TPro  5.1<L>  /  Alb  3.1<L>  /  TBili  <0.2  /  DBili  x   /  AST  32  /  ALT  24  /  AlkPhos  91  10-27                  RADIOLOGY:    PHYSICAL EXAM:  GEN: No acute distress  PULM/CHEST: Clear to auscultation bilaterally  CVS: Regular rate and rhythm  ABD: Soft, non-tender, non-distended, +BS  NEURO: AAOx3    Freeman Catheter:

## 2021-10-27 NOTE — PROGRESS NOTE ADULT - SUBJECTIVE AND OBJECTIVE BOX
SHAUN COATES  76y, Female  Allergy: codeine (Other (Moderate))  Depakote (Unknown)  Dilaudid (Short breath; Rash)  IV Contrast (Anaphylaxis)  losartan (Angioedema)  Risperdal (Other)  verapamil (Short breath; Angioedema)      LOS  6d    CHIEF COMPLAINT: Difficulty Breathing (27 Oct 2021 14:49)      INTERVAL EVENTS/HPI  - No acute events overnight  - T(F): , Max: 97.9 (10-26-21 @ 23:19)  - Denies any worsening symptoms  - Tolerating medication  - WBC Count: 7.69 (10-26-21 @ 06:07)  WBC Count: 7.83 (10-25-21 @ 05:50)     - Creatinine, Serum: 0.8 (10-27-21 @ 06:26)  Creatinine, Serum: 0.8 (10-26-21 @ 06:07)       ROS  General: Denies rigors, nightsweats  HEENT: Denies headache, rhinorrhea, sore throat, eye pain  CV: Denies CP, palpitations  PULM: Denies wheezing, hemoptysis  GI: Denies hematemesis, hematochezia, melena  : Denies discharge, hematuria  MSK: Denies arthralgias, myalgias  SKIN: Denies rash, lesions  NEURO: Denies paresthesias, weakness  PSYCH: Denies depression, anxiety    VITALS:  T(F): 97.2, Max: 97.9 (10-26-21 @ 23:19)  HR: 73  BP: 141/65  RR: 18Vital Signs Last 24 Hrs  T(C): 36.2 (27 Oct 2021 07:01), Max: 36.6 (26 Oct 2021 23:19)  T(F): 97.2 (27 Oct 2021 07:01), Max: 97.9 (26 Oct 2021 23:19)  HR: 73 (27 Oct 2021 07:08) (73 - 75)  BP: 141/65 (27 Oct 2021 07:08) (121/59 - 141/65)  BP(mean): 93 (27 Oct 2021 07:08) (85 - 93)  RR: 18 (27 Oct 2021 07:01) (18 - 18)  SpO2: --    PHYSICAL EXAM:  Gen: NAD, resting in bed  HEENT: Normocephalic, atraumatic  Neck: supple, no lymphadenopathy  CV: Regular rate & regular rhythm  Lungs: decreased BS at bases, no fremitus  Abdomen: Soft, BS present  Ext: Warm, well perfused  Neuro: non focal, awake  Skin: no rash, no erythema  Lines: no phlebitis    FH: Non-contributory  Social Hx: Non-contributory    TESTS & MEASUREMENTS:                        11.6   7.69  )-----------( 218      ( 26 Oct 2021 06:07 )             36.5     10-27    141  |  105  |  26<H>  ----------------------------<  78  3.9   |  25  |  0.8    Ca    7.7<L>      27 Oct 2021 06:26  Mg     2.1     10-26    TPro  5.1<L>  /  Alb  3.1<L>  /  TBili  <0.2  /  DBili  x   /  AST  32  /  ALT  24  /  AlkPhos  91  10-27    eGFR if Non African American: 73 mL/min/1.73M2 (10-27-21 @ 06:26)  eGFR if : 84 mL/min/1.73M2 (10-27-21 @ 06:26)    LIVER FUNCTIONS - ( 27 Oct 2021 06:26 )  Alb: 3.1 g/dL / Pro: 5.1 g/dL / ALK PHOS: 91 U/L / ALT: 24 U/L / AST: 32 U/L / GGT: x               Culture - Urine (collected 10-21-21 @ 14:44)  Source: Clean Catch Clean Catch (Midstream)  Final Report (10-25-21 @ 11:01):    10,000 - 49,000 CFU/mL Escherichia coli  Organism: Escherichia coli (10-25-21 @ 11:01)  Organism: Escherichia coli (10-25-21 @ 11:01)      -  Amikacin: S <=16      -  Amoxicillin/Clavulanic Acid: S <=8/4      -  Ampicillin: S <=8 These ampicillin results predict results for amoxicillin      -  Ampicillin/Sulbactam: S <=4/2 Enterobacter, Citrobacter, and Serratia may develop resistance during prolonged therapy (3-4 days)      -  Aztreonam: S <=4      -  Cefazolin: S <=2 (MIC_CL_COM_ENTERIC_CEFAZU) For uncomplicated UTI with K. pneumoniae, E. coli, or P. mirablis: MARCELLO <=16 is sensitive and MARCELLO >=32 is resistant. This also predicts results for oral agents cefaclor, cefdinir, cefpodoxime, cefprozil, cefuroxime axetil, cephalexin and locarbef for uncomplicated UTI. Note that some isolates may be susceptible to these agents while testing resistant to cefazolin.      -  Cefepime: S <=2      -  Cefoxitin: S <=8      -  Ceftriaxone: S <=1 Enterobacter, Citrobacter, and Serratia may develop resistance during prolonged therapy      -  Ciprofloxacin: R >2      -  Ertapenem: S <=0.5      -  Gentamicin: S <=2      -  Imipenem: S <=1      -  Levofloxacin: R >4      -  Meropenem: S <=1      -  Nitrofurantoin: S <=32 Should not be used to treat pyelonephritis      -  Piperacillin/Tazobactam: S <=8      -  Tigecycline: S <=2      -  Tobramycin: S <=2      -  Trimethoprim/Sulfamethoxazole: S <=0.5/9.5      Method Type: MARCELLO    Culture - Blood (collected 10-21-21 @ 13:56)  Source: .Blood Blood  Final Report (10-26-21 @ 22:01):    No Growth Final            INFECTIOUS DISEASES TESTING  Rapid RVP Result: NotDetec (10-22-21 @ 13:45)  Procalcitonin, Serum: 0.80 (10-22-21 @ 04:45)  COVID-19 PCR: NotDetec (10-21-21 @ 13:30)  COVID-19 PCR: NotDetec (01-08-21 @ 16:50)  Procalcitonin, Serum: 6.12 (12-30-20 @ 04:30)  Procalcitonin, Serum: 0.31 (12-29-20 @ 21:31)  Procalcitonin, Serum: 0.33 (12-28-20 @ 16:00)  MRSA PCR Result.: Negative (12-28-20 @ 14:33)  Procalcitonin, Serum: 1.09 (12-25-20 @ 06:34)  Procalcitonin, Serum: 0.31 (12-24-20 @ 22:52)  COVID-19 PCR: Detected (12-23-20 @ 04:50)  Procalcitonin, Serum: 0.84 (12-22-20 @ 21:56)  Procalcitonin, Serum: 0.10 (12-11-20 @ 08:14)  COVID-19 PCR: Detected (12-07-20 @ 12:30)  Procalcitonin, Serum: 0.09 (12-07-20 @ 10:05)  Procalcitonin, Serum: 0.09 (11-29-20 @ 21:30)  COVID-19 PCR: NotDetec (11-29-20 @ 13:30)  Procalcitonin, Serum: 0.10 (11-29-20 @ 13:15)      INFLAMMATORY MARKERS      RADIOLOGY & ADDITIONAL TESTS:  I have personally reviewed the last available Chest xray  CXR      CT      CARDIOLOGY TESTING  12 Lead ECG:   Ventricular Rate 67 BPM    Atrial Rate 67 BPM    P-R Interval 140 ms    QRS Duration 90 ms    Q-T Interval 472 ms    QTC Calculation(Bazett) 498 ms    P Axis 36 degrees    R Axis 14 degrees    T Axis 26 degrees    Diagnosis Line *** Poor data quality, interpretation may be adversely affected  Sinus rhythm with Premature supraventricular complexes  Possible Left atrial enlargement  Nonspecific ST abnormality  Abnormal ECG    Confirmed by VIVIENNE HAQUE MD ([id number]) on 10/24/2021 10:48:20 AM (10-24-21 @ 07:52)  12 Lead ECG:   Ventricular Rate 120 BPM    Atrial Rate 88 BPM    QRS Duration 86 ms    Q-T Interval 316 ms    QTC Calculation(Bazett) 446 ms    R Axis 4 degrees    T Axis 16 degrees    Diagnosis Line Atrial fibrillation with rapid ventricular response  Nonspecific ST abnormality  Abnormal ECG    Confirmed by VIVIENNE HAQUE MD ([id number]) on 10/23/2021 11:05:42 AM (10-23-21 @ 10:16)      MEDICATIONS  albuterol/ipratropium for Nebulization 3 Nebulizer every 6 hours  apixaban 5 Oral every 12 hours  aspirin  chewable 81 Oral daily  atorvastatin 20 Oral at bedtime  azithromycin  IVPB     azithromycin  IVPB 500 IV Intermittent every 24 hours  cefTRIAXone   IVPB 1000 IV Intermittent every 24 hours  cefTRIAXone   IVPB     chlorhexidine 4% Liquid 1 Topical two times a day  chlorhexidine 4% Liquid 1 Topical <User Schedule>  gabapentin 400 Oral three times a day  lamoTRIgine 150 Oral daily  lidocaine   4% Patch 1 Transdermal every 24 hours  metoprolol tartrate 50 Oral every 6 hours  midodrine 10 Oral every 8 hours  potassium phosphate / sodium phosphate Powder (PHOS-NaK) 1 Oral three times a day before meals  QUEtiapine 250 Oral at bedtime      WEIGHT  Weight (kg): 91.2 (10-22-21 @ 00:03)  Creatinine, Serum: 0.8 mg/dL (10-27-21 @ 06:26)      ANTIBIOTICS:  azithromycin  IVPB      azithromycin  IVPB 500 milliGRAM(s) IV Intermittent every 24 hours  cefTRIAXone   IVPB 1000 milliGRAM(s) IV Intermittent every 24 hours  cefTRIAXone   IVPB          All available historical records have been reviewed

## 2021-10-28 ENCOUNTER — TRANSCRIPTION ENCOUNTER (OUTPATIENT)
Age: 76
End: 2021-10-28

## 2021-10-28 VITALS — OXYGEN SATURATION: 100 % | HEART RATE: 88 BPM

## 2021-10-28 LAB
ALBUMIN SERPL ELPH-MCNC: 3 G/DL — LOW (ref 3.5–5.2)
ALP SERPL-CCNC: 88 U/L — SIGNIFICANT CHANGE UP (ref 30–115)
ALT FLD-CCNC: 20 U/L — SIGNIFICANT CHANGE UP (ref 0–41)
ANION GAP SERPL CALC-SCNC: 11 MMOL/L — SIGNIFICANT CHANGE UP (ref 7–14)
AST SERPL-CCNC: 18 U/L — SIGNIFICANT CHANGE UP (ref 0–41)
BASOPHILS # BLD AUTO: 0.03 K/UL — SIGNIFICANT CHANGE UP (ref 0–0.2)
BASOPHILS NFR BLD AUTO: 0.5 % — SIGNIFICANT CHANGE UP (ref 0–1)
BILIRUB SERPL-MCNC: <0.2 MG/DL — SIGNIFICANT CHANGE UP (ref 0.2–1.2)
BUN SERPL-MCNC: 29 MG/DL — HIGH (ref 10–20)
CALCIUM SERPL-MCNC: 8 MG/DL — LOW (ref 8.5–10.1)
CHLORIDE SERPL-SCNC: 104 MMOL/L — SIGNIFICANT CHANGE UP (ref 98–110)
CO2 SERPL-SCNC: 27 MMOL/L — SIGNIFICANT CHANGE UP (ref 17–32)
CREAT SERPL-MCNC: 0.9 MG/DL — SIGNIFICANT CHANGE UP (ref 0.7–1.5)
EOSINOPHIL # BLD AUTO: 0.71 K/UL — HIGH (ref 0–0.7)
EOSINOPHIL NFR BLD AUTO: 11.5 % — HIGH (ref 0–8)
GLUCOSE SERPL-MCNC: 87 MG/DL — SIGNIFICANT CHANGE UP (ref 70–99)
HCT VFR BLD CALC: 31 % — LOW (ref 37–47)
HGB BLD-MCNC: 9.8 G/DL — LOW (ref 12–16)
IMM GRANULOCYTES NFR BLD AUTO: 1.5 % — HIGH (ref 0.1–0.3)
LYMPHOCYTES # BLD AUTO: 1.4 K/UL — SIGNIFICANT CHANGE UP (ref 1.2–3.4)
LYMPHOCYTES # BLD AUTO: 22.7 % — SIGNIFICANT CHANGE UP (ref 20.5–51.1)
MCHC RBC-ENTMCNC: 30.9 PG — SIGNIFICANT CHANGE UP (ref 27–31)
MCHC RBC-ENTMCNC: 31.6 G/DL — LOW (ref 32–37)
MCV RBC AUTO: 97.8 FL — SIGNIFICANT CHANGE UP (ref 81–99)
MONOCYTES # BLD AUTO: 0.52 K/UL — SIGNIFICANT CHANGE UP (ref 0.1–0.6)
MONOCYTES NFR BLD AUTO: 8.4 % — SIGNIFICANT CHANGE UP (ref 1.7–9.3)
NEUTROPHILS # BLD AUTO: 3.43 K/UL — SIGNIFICANT CHANGE UP (ref 1.4–6.5)
NEUTROPHILS NFR BLD AUTO: 55.4 % — SIGNIFICANT CHANGE UP (ref 42.2–75.2)
NRBC # BLD: 0 /100 WBCS — SIGNIFICANT CHANGE UP (ref 0–0)
PLATELET # BLD AUTO: 259 K/UL — SIGNIFICANT CHANGE UP (ref 130–400)
POTASSIUM SERPL-MCNC: 4.3 MMOL/L — SIGNIFICANT CHANGE UP (ref 3.5–5)
POTASSIUM SERPL-SCNC: 4.3 MMOL/L — SIGNIFICANT CHANGE UP (ref 3.5–5)
PROT SERPL-MCNC: 4.9 G/DL — LOW (ref 6–8)
RBC # BLD: 3.17 M/UL — LOW (ref 4.2–5.4)
RBC # FLD: 14.7 % — HIGH (ref 11.5–14.5)
SODIUM SERPL-SCNC: 142 MMOL/L — SIGNIFICANT CHANGE UP (ref 135–146)
WBC # BLD: 6.18 K/UL — SIGNIFICANT CHANGE UP (ref 4.8–10.8)
WBC # FLD AUTO: 6.18 K/UL — SIGNIFICANT CHANGE UP (ref 4.8–10.8)

## 2021-10-28 PROCEDURE — 99232 SBSQ HOSP IP/OBS MODERATE 35: CPT

## 2021-10-28 PROCEDURE — 99239 HOSP IP/OBS DSCHRG MGMT >30: CPT

## 2021-10-28 PROCEDURE — 93010 ELECTROCARDIOGRAM REPORT: CPT

## 2021-10-28 RX ORDER — METOPROLOL TARTRATE 50 MG
1 TABLET ORAL
Qty: 60 | Refills: 0
Start: 2021-10-28 | End: 2021-11-26

## 2021-10-28 RX ORDER — LAMOTRIGINE 25 MG/1
1 TABLET, ORALLY DISINTEGRATING ORAL
Qty: 0 | Refills: 0 | DISCHARGE
Start: 2021-10-28

## 2021-10-28 RX ORDER — APIXABAN 2.5 MG/1
1 TABLET, FILM COATED ORAL
Qty: 60 | Refills: 0
Start: 2021-10-28 | End: 2021-11-26

## 2021-10-28 RX ADMIN — Medication 3 MILLILITER(S): at 16:21

## 2021-10-28 RX ADMIN — APIXABAN 5 MILLIGRAM(S): 2.5 TABLET, FILM COATED ORAL at 05:40

## 2021-10-28 RX ADMIN — GABAPENTIN 400 MILLIGRAM(S): 400 CAPSULE ORAL at 05:40

## 2021-10-28 RX ADMIN — LIDOCAINE 1 PATCH: 4 CREAM TOPICAL at 17:00

## 2021-10-28 RX ADMIN — CHLORHEXIDINE GLUCONATE 1 APPLICATION(S): 213 SOLUTION TOPICAL at 05:42

## 2021-10-28 RX ADMIN — Medication 3 MILLILITER(S): at 09:05

## 2021-10-28 RX ADMIN — Medication 1 PACKET(S): at 07:45

## 2021-10-28 RX ADMIN — LAMOTRIGINE 150 MILLIGRAM(S): 25 TABLET, ORALLY DISINTEGRATING ORAL at 11:00

## 2021-10-28 RX ADMIN — Medication 1 PACKET(S): at 17:02

## 2021-10-28 RX ADMIN — Medication 50 MILLIGRAM(S): at 11:01

## 2021-10-28 RX ADMIN — Medication 1 PACKET(S): at 11:01

## 2021-10-28 RX ADMIN — Medication 81 MILLIGRAM(S): at 11:00

## 2021-10-28 RX ADMIN — Medication 50 MILLIGRAM(S): at 17:00

## 2021-10-28 RX ADMIN — GABAPENTIN 400 MILLIGRAM(S): 400 CAPSULE ORAL at 13:37

## 2021-10-28 RX ADMIN — Medication 0.5 MILLIGRAM(S): at 09:50

## 2021-10-28 RX ADMIN — Medication 50 MILLIGRAM(S): at 05:40

## 2021-10-28 RX ADMIN — LIDOCAINE 1 PATCH: 4 CREAM TOPICAL at 06:42

## 2021-10-28 RX ADMIN — APIXABAN 5 MILLIGRAM(S): 2.5 TABLET, FILM COATED ORAL at 17:00

## 2021-10-28 RX ADMIN — MIDODRINE HYDROCHLORIDE 10 MILLIGRAM(S): 2.5 TABLET ORAL at 05:40

## 2021-10-28 NOTE — DISCHARGE NOTE PROVIDER - NSDCFUSCHEDAPPT_GEN_ALL_CORE_FT
SHAUN COATES ; 12/16/2021 ; [facility] [address] SHAUN Preciado ; 01/25/2022 ; [facility] [address]

## 2021-10-28 NOTE — PROGRESS NOTE ADULT - SUBJECTIVE AND OBJECTIVE BOX
Patient is seen and examined at the bed side, is afebrile.  The Urine culture grew E.coli. She is s/p cardiac cath, and tolerated the procedure well.       REVIEW OF SYSTEMS: All other review systems are negative      ALLERGIES: codeine (Other (Moderate))  Depakote (Unknown), Dilaudid (Short breath; Rash), IV Contrast (Anaphylaxis)  losartan (Angioedema), Risperdal (Other), verapamil (Short breath; Angioedema)      ICU Vital Signs Last 24 Hrs  T(C): 37.3 (28 Oct 2021 15:01), Max: 37.3 (28 Oct 2021 15:01)  T(F): 99.2 (28 Oct 2021 15:01), Max: 99.2 (28 Oct 2021 15:01)  HR: 84 (28 Oct 2021 15:07) (76 - 91)  BP: 141/79 (28 Oct 2021 15:07) (97/54 - 141/79)  BP(mean): 104 (28 Oct 2021 15:07) (72 - 104)  ABP: --  ABP(mean): --  RR: 20 (28 Oct 2021 15:01) (18 - 24)  SpO2: 96% (28 Oct 2021 15:07) (96% - 100%)        PHYSICAL EXAM:  GENERAL: Not in distress, on BIPAP  CHEST/LUNG:  Not using accessory muscles   HEART: s1 and s2 present  ABDOMEN:  Nontender and  Nondistended  EXTREMITIES: No pedal  edema  CNS: Awake and Alert      LABS:                                   9.8    6.18  )-----------( 259      ( 28 Oct 2021 05:54 )             31.0                10.3   7.83  )-----------( 219      ( 25 Oct 2021 05:50 )             31.1         10-28    142  |  104  |  29<H>  ----------------------------<  87  4.3   |  27  |  0.9    Ca    8.0<L>      28 Oct 2021 05:54    TPro  4.9<L>  /  Alb  3.0<L>  /  TBili  <0.2  /  DBili  x   /  AST  18  /  ALT  20  /  AlkPhos  88  10-28      10-25    144  |  106  |  21<H>  ----------------------------<  99  3.6   |  25  |  0.8    Ca    7.6<L>      25 Oct 2021 05:50  Mg     2.1     10-24    TPro  5.3<L>  /  Alb  3.1<L>  /  TBili  <0.2  /  DBili  x   /  AST  21  /  ALT  13  /  AlkPhos  99  10-25    PT/INR - ( 24 Oct 2021 05:38 )   PT: 13.60 sec;   INR: 1.18 ratio       PTT - ( 24 Oct 2021 05:38 )  PTT:54.9 sec      Procalcitonin, Serum (10.22.21 @ 04:45)   Procalcitonin, Serum: 0.80:      MEDICATIONS  (STANDING):    albuterol/ipratropium for Nebulization 3 milliLiter(s) Nebulizer every 6 hours  apixaban 5 milliGRAM(s) Oral every 12 hours  aspirin  chewable 81 milliGRAM(s) Oral daily  atorvastatin 20 milliGRAM(s) Oral at bedtime  chlorhexidine 4% Liquid 1 Application(s) Topical two times a day  chlorhexidine 4% Liquid 1 Application(s) Topical <User Schedule>  gabapentin 400 milliGRAM(s) Oral three times a day  lamoTRIgine 150 milliGRAM(s) Oral daily  lidocaine   4% Patch 1 Patch Transdermal every 24 hours  metoprolol tartrate 50 milliGRAM(s) Oral every 6 hours  potassium phosphate / sodium phosphate Powder (PHOS-NaK) 1 Packet(s) Oral three times a day before meals  QUEtiapine 250 milliGRAM(s) Oral at bedtime        RADIOLOGY & ADDITIONAL TESTS:    < from: Xray Chest 1 View- PORTABLE-Routine (Xray Chest 1 View- PORTABLE-Routine in AM.) (10.24.21 @ 07:04) >    Bilateral opacities, slightly worse.      < from: CT Abdomen and Pelvis No Cont (10.22.21 @ 21:48) >  1.  New, small bibasilar pleural effusions, right greater than left with associated atelectasis.  2.  New hazy stranding of fat in the anterior left lower quadrant pelvic wall of uncertain significance; clinical correlation for any history of trauma, etc. is recommended.  3.  Post-cholecystectomy.  4.  No retroperitoneal hematoma is seen.  5.  No evidence of bowel obstruction.      < from: US Abdomen Complete (US Abdomen Complete .) (10.22.21 @ 16:15) >  1. Post cholecystectomy.  2. Renal lesions, likely cysts but incompletely characterized. NonemergentMRI may be obtained for further evaluation.  3. No acute abnormality.        MICROBIOLOGY DATA:    Respiratory Viral Panel with COVID-19 by [facility] (10.22.21 @ 13:45)   Rapid RVP Result: NotDete   SARS-CoV-2: NotDetec:  Culture - Urine (10.21.21 @ 14:44)   - Amikacin: S <=16   - Amoxicillin/Clavulanic Acid: S <=8/4   - Ampicillin: S <=8 These ampicillin results predict results for amoxicillin   - Ampicillin/Sulbactam: S <=4/2 Enterobacter, Citrobacter, and Serratia may develop resistance during prolonged therapy (3-4 days)   - Aztreonam: S <=4   - Cefazolin: S <=2 (MIC_CL_COM_ENTERIC_CEFAZU) For uncomplicated UTI with K. pneumoniae, E. coli, or P. mirablis: MARCELLO <=16 is sensitive and MARCELLO >=32 is resistant. This also predicts results for oral agents cefaclor, cefdinir, cefpodoxime, cefprozil, cefuroxime axetil, cephalexin and locarbef for uncomplicated UTI. Note that some isolates may be susceptible to these agents while testing resistant to cefazolin.   - Cefepime: S <=2   - Cefoxitin: S <=8   - Ceftriaxone: S <=1 Enterobacter, Citrobacter, and Serratia may develop resistance during prolonged therapy   - Ciprofloxacin: R >2   - Ertapenem: S <=0.5   - Gentamicin: S <=2   - Imipenem: S <=1   - Levofloxacin: R >4   - Meropenem: S <=1   - Nitrofurantoin: S <=32 Should not be used to treat pyelonephritis   - Piperacillin/Tazobactam: S <=8   - Tigecycline: S <=2   - Tobramycin: S <=2   - Trimethoprim/Sulfamethoxazole: S <=0.5/9.5   Specimen Source: Clean Catch Clean Catch (Midstream)   Culture Results:   10,000 - 49,000 CFU/mL Escherichia coli     Urine Microscopic-Add On (NC) (10.21.21 @ 14:44)   Red Blood Cell - Urine: 3-5 /HPF   White Blood Cell - Urine: >50 /HPF   Bacteria: Moderate   Epithelial Cells: Few /HPF     Culture - Urine (10.21.21 @ 14:44)   Specimen Source: Clean Catch Clean Catch (Midstream)   Culture Results:   10,000 - 49,000 CFU/mL Gram Negative Rods     Culture - Blood (10.21.21 @ 13:56)   Specimen Source: .Blood Blood   Culture Results: No growth to date.     Respiratory Viral Panel with COVID-19 by [facility] (10.22.21 @ 13:45)   Rapid RVP Result: NotDetec   SARS-CoV-2: NotDetec    COVID-19 [place] Domain Antibody (10.22.21 @ 06:14)   COVID-19 [place] Domain Antibody Result: >250.00

## 2021-10-28 NOTE — DISCHARGE NOTE PROVIDER - NSDCMRMEDTOKEN_GEN_ALL_CORE_FT
albuterol 90 mcg/inh inhalation aerosol: 1 puff(s) inhaled every 4 hours, As needed, Shortness of Breath  amLODIPine 5 mg oral tablet: 1 tab(s) orally once a day  aspirin 81 mg oral tablet, chewable: 1 tab(s) orally once a day  atorvastatin 20 mg oral tablet: 1 tab(s) orally once a day (at bedtime)  budesonide-formoterol 160 mcg-4.5 mcg/inh inhalation aerosol: 2 puff(s) inhaled 2 times a day  clopidogrel 75 mg oral tablet: 1 tab(s) orally once a day  gabapentin 300 mg oral capsule: 1 cap(s) orally 3 times a day  lamoTRIgine 100 mg oral tablet: 1 tab(s) orally once a day  LORazepam 0.5 mg oral tablet: 1 tab(s) orally 2 times a day, As Needed  metoprolol tartrate 100 mg oral tablet: 1 tab(s) orally every 12 hours  montelukast 10 mg oral tablet: 1 tab(s) orally once a day  Multiple Vitamins oral tablet: 1 tab(s) orally once a day  pantoprazole 40 mg oral delayed release tablet: 1 tab(s) orally once a day (before a meal)  QUEtiapine 200 mg oral tablet: 1 tab(s) orally once a day (at bedtime)  senna oral tablet: 2 tab(s) orally once a day (at bedtime)  tiotropium 18 mcg inhalation capsule: 1 cap(s) inhaled once a day   albuterol 90 mcg/inh inhalation aerosol: 1 puff(s) inhaled every 4 hours, As needed, Shortness of Breath  amLODIPine 5 mg oral tablet: 1 tab(s) orally once a day  apixaban 5 mg oral tablet: 1 tab(s) orally every 12 hours  aspirin 81 mg oral tablet, chewable: 1 tab(s) orally once a day  atorvastatin 20 mg oral tablet: 1 tab(s) orally once a day (at bedtime)  budesonide-formoterol 160 mcg-4.5 mcg/inh inhalation aerosol: 2 puff(s) inhaled 2 times a day  gabapentin 300 mg oral capsule: 1 cap(s) orally 3 times a day  lamoTRIgine 150 mg oral tablet: 1 tab(s) orally once a day  LORazepam 0.5 mg oral tablet: 1 tab(s) orally 2 times a day, As Needed  metoprolol tartrate 75 mg oral tablet: 1 tab(s) orally 2 times a day  montelukast 10 mg oral tablet: 1 tab(s) orally once a day  Multiple Vitamins oral tablet: 1 tab(s) orally once a day  pantoprazole 40 mg oral delayed release tablet: 1 tab(s) orally once a day (before a meal)  QUEtiapine 200 mg oral tablet: 1 tab(s) orally once a day (at bedtime)  senna oral tablet: 2 tab(s) orally once a day (at bedtime)  tiotropium 18 mcg inhalation capsule: 1 cap(s) inhaled once a day

## 2021-10-28 NOTE — DISCHARGE NOTE PROVIDER - HOSPITAL COURSE
77 y/o PMHx COPD on home o2, A-Fib, CVA hx on aspirin and Plavix, Emphysema, HTN, s/p Carotid artheroplasty and Covid-19 in 1/2021 presented with fevers and left calf pain. She was found to be in deptic Shock - CAP vs UTI. Urine Cx grew E coli   - CT Abdomen and Pelvis No Cont (10.22.21 @ 21:48): New, small bibasilar pleural effusions, right greater than left with associated atelectasis. New hazy stranding of fat in the anterior left lower quadrant pelvic wall of uncertain significance; clinical correlation for any history of trauma, etc. is recommended. Post-cholecystectomy No retroperitoneal hematoma is seen. No evidence of bowel obstruction. She was also found to have NSTEMI for which she underwent PCI without any intervention at [facility]. Plavix was taken off and started on aspirin and eliquis. Patient is asymptomatic, back at baseline, finished antibiotics course and is ready for discharge.

## 2021-10-28 NOTE — DISCHARGE NOTE NURSING/CASE MANAGEMENT/SOCIAL WORK - PATIENT PORTAL LINK FT
You can access the FollowMyHealth Patient Portal offered by [facility] by registering at the following website: [email or web address]. By joining [employer]’s FollowMyHealth portal, you will also be able to view your health information using other applications (apps) compatible with our system.

## 2021-10-28 NOTE — PROGRESS NOTE ADULT - ASSESSMENT
Patient on o2. Appreciate cardiac cath. Medical rx. Lying flat.   Try ambulate on o2. On lopressor 50 q6 . One dose held. Will try stop midodrine. In past tolerated lopressor 100 bid

## 2021-10-28 NOTE — DISCHARGE NOTE PROVIDER - CARE PROVIDER_API CALL
Carl Bishop)  Cardiovascular Disease; Internal Medicine  [address] [place] [address]  Phone: [phone]  Fax: [phone]  Established Patient  Follow Up Time: 2 weeks

## 2021-10-28 NOTE — DISCHARGE NOTE NURSING/CASE MANAGEMENT/SOCIAL WORK - NSDCVIVACCINE_GEN_ALL_CORE_FT
influenza, injectable, quadrivalent, preservative free; 03-Oct-2019 11:19; Mona Martinez (RN); [employer]; [id number] (Exp. Date: 30-Jun-2020); IntraMuscular; Deltoid Left.; 0.5 milliLiter(s); VIS (VIS Published: 15-Aug-2019, VIS Presented: 03-Oct-2019);   Tdap; 13-Oct-2018 09:00; Shanell Davis (RN); [employer]; [id number] (Exp. Date: 22-Aug-2020); IntraMuscular; Deltoid Left.; 0.5 milliLiter(s); VIS (VIS Published: 09-May-2013, VIS Presented: 13-Oct-2018);

## 2021-10-28 NOTE — PROGRESS NOTE ADULT - ASSESSMENT
A 75 yo PMHx COPD on home o2, A-Fib, CVA hx on aspirin and Plavix, Emphysema, HTN, s/p Carotid artheroplasty and Covid-19 in 1/2021 who presents with fever and left calf pain.    IMPRESSION  #Septic Shock - CAP  # UTI- Urine Cx grew E.coli  #Bilateral Leg Pain     would recommend:    1. Monitor  off ABx, s/p completed the  course  2. Taper off steroid as tolerated   3. Management of BIPAP as per CCU protocol    d/w patient and CCU team    Attending Attestation:    Spent more than 35 minutes on total encounter, more than 50 % of the visit was spent counseling and/or coordinating care by the Attending physician.

## 2021-10-28 NOTE — PROGRESS NOTE ADULT - REASON FOR ADMISSION
Difficulty Breathing

## 2021-10-28 NOTE — PROGRESS NOTE ADULT - SUBJECTIVE AND OBJECTIVE BOX
Patient seen and evaluated this am, sitting comfortably in chair, no SOB, no CP      T(F): 96.2 (10-28-21 @ 07:10), Max: 98.1 (10-27-21 @ 15:00)  HR: 81 (10-28-21 @ 07:11)  BP: 116/59 (10-28-21 @ 07:11)  RR: 18 (10-28-21 @ 07:10)  SpO2: 100% (10-28-21 @ 07:11) (96% - 100%)    PHYSICAL EXAM:  GENERAL: NAD  HEAD:  Atraumatic, Normocephalic  EYES: EOMI, PERRLA, conjunctiva and sclera clear  NERVOUS SYSTEM:  Alert & Oriented X3, no focal deficits   CHEST/LUNG: Clear to percussion bilaterally; No rales, rhonchi, wheezing, or rubs  HEART: Regular rate and rhythm; No murmurs, rubs, or gallops  ABDOMEN: Soft, Nontender, Nondistended; Bowel sounds present  EXTREMITIES:  2+ Peripheral Pulses, No clubbing, cyanosis, or edema    LABS  10-28    142  |  104  |  29<H>  ----------------------------<  87  4.3   |  27  |  0.9    Ca    8.0<L>      28 Oct 2021 05:54    TPro  4.9<L>  /  Alb  3.0<L>  /  TBili  <0.2  /  DBili  x   /  AST  18  /  ALT  20  /  AlkPhos  88  10-28                          9.8    6.18  )-----------( 259      ( 28 Oct 2021 05:54 )             31.0       Culture Results:   10,000 - 49,000 CFU/mL Escherichia coli (10-21-21)  Culture Results:   No Growth Final (10-21-21)      MEDICATIONS  (STANDING):  albuterol/ipratropium for Nebulization 3 milliLiter(s) Nebulizer every 6 hours  apixaban 5 milliGRAM(s) Oral every 12 hours  aspirin  chewable 81 milliGRAM(s) Oral daily  atorvastatin 20 milliGRAM(s) Oral at bedtime  chlorhexidine 4% Liquid 1 Application(s) Topical two times a day  gabapentin 400 milliGRAM(s) Oral three times a day  lamoTRIgine 150 milliGRAM(s) Oral daily  lidocaine   4% Patch 1 Patch Transdermal every 24 hours  metoprolol tartrate 50 milliGRAM(s) Oral every 6 hours  potassium phosphate / sodium phosphate Powder (PHOS-NaK) 1 Packet(s) Oral three times a day before meals  QUEtiapine 250 milliGRAM(s) Oral at bedtime    MEDICATIONS  (PRN):  acetaminophen     Tablet .. 650 milliGRAM(s) Oral every 6 hours PRN Temp greater or equal to 38.5C (101.3F), Moderate Pain (4 - 6)  acetaminophen  Suppository .. 650 milliGRAM(s) Rectal every 8 hours PRN Temp greater or equal to 38C (100.4F)  LORazepam     Tablet 0.5 milliGRAM(s) Oral two times a day PRN Anxiety

## 2021-10-28 NOTE — DISCHARGE NOTE PROVIDER - NSDCCPCAREPLAN_GEN_ALL_CORE_FT
PRINCIPAL DISCHARGE DIAGNOSIS  Diagnosis: Sepsis  Assessment and Plan of Treatment: You came here with infection in urine for which you received antibiotics and completed the course,. For afib, you were started on eliquis. continue aspirin and lopressor.      SECONDARY DISCHARGE DIAGNOSES  Diagnosis: Paroxysmal atrial fibrillation with RVR  Assessment and Plan of Treatment:     Diagnosis: SOB (shortness of breath)  Assessment and Plan of Treatment:

## 2021-10-28 NOTE — PROGRESS NOTE ADULT - ASSESSMENT
77 y/o PMHx COPD on home o2, paroxysmal A-Fib, HFpEF,  CVA,  HTN, s/p Carotid arthroplasty and Covid-19 in January, subsequently vaccinated.  Pt presented to the ED today with SOB and fever     sepsis secondary to UTI vs community acquired pneumonia / acute on chronic HFpEF - resolved /  type 2 NSTEMI      - s/p cardiac cath  -> non obstructive  CAD   - completed  antibiotic course    - aspirin, Lipitor, metoprolol   -  Plavix DC'd.. started on  Eliquis    - continue home psych meds   - may DC home today   - 32min spent on DC 77 y/o PMHx COPD on home o2, paroxysmal A-Fib, HFpEF,  CVA,  HTN, s/p Carotid arthroplasty and Covid-19 in January, subsequently vaccinated.  Pt presented to the ED with SOB and fever     sepsis secondary to UTI vs community acquired pneumonia / acute on chronic HFpEF - resolved /  type 2 NSTEMI      - s/p cardiac cath  -> non obstructive  CAD   - completed  antibiotic course    - aspirin, Lipitor, metoprolol   -  Plavix DC'd.. started on  Eliquis    - continue home psych meds   - may DC home today   - 32min spent on DC

## 2021-10-28 NOTE — PROGRESS NOTE ADULT - SUBJECTIVE AND OBJECTIVE BOX
Patient is a 76y old  Female who presents with a chief complaint of Difficulty Breathing (27 Oct 2021 16:26)      T(F): 96.2 (10-28-21 @ 07:10), Max: 98.1 (10-27-21 @ 15:00)  HR: 91 (10-28-21 @ 06:06)  BP: 126/60 (10-28-21 @ 06:06)  RR: 18 (10-28-21 @ 07:10)  SpO2: 96% (10-28-21 @ 06:06) (96% - 100%)    PHYSICAL EXAM:  GENERAL: NAD, well-groomed, well-developed  HEAD:  Atraumatic, Normocephalic  EYES: EOMI, PERRLA, conjunctiva and sclera clear  ENMT: No tonsillar erythema, exudates, or enlargement; Moist mucous membranes, Good dentition, No lesions  NECK: Supple, No JVD, Normal thyroid  NERVOUS SYSTEM:  Alert & Oriented X3,  Motor Strength 5/5 B/L upper and lower extremities  CHEST/LUNG: Clear to percussion bilaterally; No rales, rhonchi, wheezing, or rubs Decreased bs  HEART: Regular rate and rhythm; No murmurs, rubs, or gallops  ABDOMEN: Soft, Nontender, Nondistended; Bowel sounds present  EXTREMITIES:   No clubbing, cyanosis, or edema  LYMPH: No lymphadenopathy noted  SKIN: No rashes or lesions    labs  10-27    141  |  105  |  26<H>  ----------------------------<  78  3.9   |  25  |  0.8    Ca    7.7<L>      27 Oct 2021 06:26    TPro  5.1<L>  /  Alb  3.1<L>  /  TBili  <0.2  /  DBili  x   /  AST  32  /  ALT  24  /  AlkPhos  91  10-27                          9.8    6.18  )-----------( 259      ( 28 Oct 2021 05:54 )             31.0               acetaminophen     Tablet .. 650 milliGRAM(s) Oral every 6 hours PRN  acetaminophen  Suppository .. 650 milliGRAM(s) Rectal every 8 hours PRN  albuterol/ipratropium for Nebulization 3 milliLiter(s) Nebulizer every 6 hours  apixaban 5 milliGRAM(s) Oral every 12 hours  aspirin  chewable 81 milliGRAM(s) Oral daily  atorvastatin 20 milliGRAM(s) Oral at bedtime  azithromycin  IVPB      azithromycin  IVPB 500 milliGRAM(s) IV Intermittent every 24 hours  cefTRIAXone   IVPB 1000 milliGRAM(s) IV Intermittent every 24 hours  cefTRIAXone   IVPB      chlorhexidine 4% Liquid 1 Application(s) Topical two times a day  chlorhexidine 4% Liquid 1 Application(s) Topical <User Schedule>  gabapentin 400 milliGRAM(s) Oral three times a day  lamoTRIgine 150 milliGRAM(s) Oral daily  lidocaine   4% Patch 1 Patch Transdermal every 24 hours  LORazepam     Tablet 0.5 milliGRAM(s) Oral two times a day PRN  metoprolol tartrate 50 milliGRAM(s) Oral every 6 hours  midodrine 10 milliGRAM(s) Oral every 8 hours  potassium phosphate / sodium phosphate Powder (PHOS-NaK) 1 Packet(s) Oral three times a day before meals  QUEtiapine 250 milliGRAM(s) Oral at bedtime

## 2021-10-29 PROBLEM — I48.91 UNSPECIFIED ATRIAL FIBRILLATION: Chronic | Status: ACTIVE | Noted: 2021-10-21

## 2021-10-29 PROBLEM — U07.1 COVID-19: Chronic | Status: ACTIVE | Noted: 2021-10-21

## 2021-10-29 PROBLEM — J96.90 RESPIRATORY FAILURE, UNSPECIFIED, UNSPECIFIED WHETHER WITH HYPOXIA OR HYPERCAPNIA: Chronic | Status: ACTIVE | Noted: 2021-10-21

## 2021-11-01 ENCOUNTER — APPOINTMENT (OUTPATIENT)
Dept: CARE COORDINATION | Facility: HOME HEALTH | Age: 76
End: 2021-11-01
Payer: MEDICARE

## 2021-11-01 DIAGNOSIS — J18.9 PNEUMONIA, UNSPECIFIED ORGANISM: ICD-10-CM

## 2021-11-01 PROCEDURE — 99496 TRANSJ CARE MGMT HIGH F2F 7D: CPT

## 2021-11-01 RX ORDER — LIDOCAINE 5% 700 MG/1
5 PATCH TOPICAL
Qty: 60 | Refills: 0 | Status: ACTIVE | COMMUNITY
Start: 2021-10-04

## 2021-11-01 RX ORDER — FUROSEMIDE 40 MG/1
40 TABLET ORAL
Refills: 0 | Status: DISCONTINUED | COMMUNITY
End: 2021-11-01

## 2021-11-01 RX ORDER — NITROFURANTOIN (MONOHYDRATE/MACROCRYSTALS) 25; 75 MG/1; MG/1
100 CAPSULE ORAL
Qty: 10 | Refills: 0 | Status: DISCONTINUED | COMMUNITY
Start: 2021-09-25

## 2021-11-01 RX ORDER — DICLOFENAC SODIUM 1% 10 MG/G
1 GEL TOPICAL
Qty: 100 | Refills: 0 | Status: ACTIVE | COMMUNITY
Start: 2021-10-04

## 2021-11-01 RX ORDER — SULFAMETHOXAZOLE AND TRIMETHOPRIM 800; 160 MG/1; MG/1
800-160 TABLET ORAL
Qty: 10 | Refills: 0 | Status: DISCONTINUED | COMMUNITY
Start: 2021-09-24

## 2021-11-01 RX ORDER — METOPROLOL TARTRATE 100 MG/1
100 TABLET, FILM COATED ORAL
Refills: 0 | Status: DISCONTINUED | COMMUNITY
Start: 2019-08-22 | End: 2021-11-01

## 2021-11-01 RX ORDER — CLOPIDOGREL BISULFATE 75 MG/1
75 TABLET, FILM COATED ORAL
Qty: 90 | Refills: 0 | Status: DISCONTINUED | COMMUNITY
Start: 2021-02-24 | End: 2021-11-01

## 2021-11-01 NOTE — HISTORY OF PRESENT ILLNESS
[FreeTextEntry1] : follow up CHF patient is temporarily unable to leave home as it requires a considerable and taxing effort , exhibits and unsteady gait and requires assistive devices to leave home.\par  [de-identified] : Patient is 75 y/o f seen at home s/p a recent discharge from [facility] for Sepsis 2ndary to UTI /CAP  10/22 to 10/28 and NSTEMI with a  PMH COPD on home o2, A-Fib, CVA , and  HTN.  Patient was treated with ABRX improved clincally , hospital course also included she was also found to have NSTEMI for which she underwent PCI without any intervention at [facility]. Plavix was taken off and started on aspirin and eliquis. In arrival to home patient is alert in NAD denies c/p, sob, cough , increased mucous  rescue unhaler use. States she has had no contact from Homecare services . Patient was contacted by RN from TCM and medication reconciliation was done with in 48 hours of discharge\par

## 2021-11-01 NOTE — PHYSICAL EXAM
[No Acute Distress] : no acute distress [No Respiratory Distress] : no respiratory distress  [No Accessory Muscle Use] : no accessory muscle use [Clear to Auscultation] : lungs were clear to auscultation bilaterally [Clear Bilaterally] : the lungs were clear to auscultation bilaterally [Rales / Crackles Bilateral] : no rales or crackles were heard [Normal Rate] : normal rate  [Regular Rhythm] : with a regular rhythm [Normal S1, S2] : normal S1 and S2 [No Carotid Bruits] : no carotid bruits [Non Tender] : non-tender [Non-distended] : non-distended [No Focal Deficits] : no focal deficits [Normal Affect] : the affect was normal [Alert and Oriented x3] : oriented to person, place, and time [Normal Insight/Judgement] : insight and judgment were intact [de-identified] : uses walker

## 2021-11-01 NOTE — PLAN
[FreeTextEntry1] : COPD/PNA- c/w Breo / Incruse contacted pharmacy refills sent , 02 supplementation , pulmonary follow up yellow zones reviewed. Adhere to all medications including demonstrating proper use of nebulizers.\par Increase activity as tolerated and maintain optimal activity levels. Continue coughing and deep breathing exercises including use of Incentive Spirometry.\par Maintain proper nutrition and adequate hydration.\par Notify NP for worsening symptoms including fever, chills, SOB, CP, increased cough and secretions.\par Follow up with MD within 7 days of discharge.\par HTN- c/w amlodipine \par A-fib- c/w Eliquis , BB \par CAD- c/w statin , ASA\par PCP house call options provided\par will brideg for HCS\par Pulmonary /cardio follow up advised

## 2021-11-01 NOTE — COUNSELING
[de-identified] : Pt was informed about CN’s role/ STARS program and overview of transitional care reviewed with patient. Pt educated on topics of importance such as compliance with prescribed medication regimen, COPD action plan and escalation process, adequate hydration, and proper diet. Pt encouraged calling CN with any issues, concerns or questions, also educated to notify CN if experiencing CP, SOB, cough, increased mucus production, increased use of rescue inhaler, fever, chills, fatigue, “chest cold symptoms” dizziness, lightheadedness, n/v/d/c, swelling to extremities and/or any signs of COPD exacerbation/flare as reviewed. Reassurance provided. Will continue to monitor\par \par \par

## 2021-11-02 DIAGNOSIS — Z88.8 ALLERGY STATUS TO OTHER DRUGS, MEDICAMENTS AND BIOLOGICAL SUBSTANCES STATUS: ICD-10-CM

## 2021-11-02 DIAGNOSIS — I73.9 PERIPHERAL VASCULAR DISEASE, UNSPECIFIED: ICD-10-CM

## 2021-11-02 DIAGNOSIS — Z87.891 PERSONAL HISTORY OF NICOTINE DEPENDENCE: ICD-10-CM

## 2021-11-02 DIAGNOSIS — I21.A1 MYOCARDIAL INFARCTION TYPE 2: ICD-10-CM

## 2021-11-02 DIAGNOSIS — E83.51 HYPOCALCEMIA: ICD-10-CM

## 2021-11-02 DIAGNOSIS — Z79.82 LONG TERM (CURRENT) USE OF ASPIRIN: ICD-10-CM

## 2021-11-02 DIAGNOSIS — E66.9 OBESITY, UNSPECIFIED: ICD-10-CM

## 2021-11-02 DIAGNOSIS — G47.33 OBSTRUCTIVE SLEEP APNEA (ADULT) (PEDIATRIC): ICD-10-CM

## 2021-11-02 DIAGNOSIS — J98.11 ATELECTASIS: ICD-10-CM

## 2021-11-02 DIAGNOSIS — E83.39 OTHER DISORDERS OF PHOSPHORUS METABOLISM: ICD-10-CM

## 2021-11-02 DIAGNOSIS — I48.0 PAROXYSMAL ATRIAL FIBRILLATION: ICD-10-CM

## 2021-11-02 DIAGNOSIS — I50.33 ACUTE ON CHRONIC DIASTOLIC (CONGESTIVE) HEART FAILURE: ICD-10-CM

## 2021-11-02 DIAGNOSIS — Z91.041 RADIOGRAPHIC DYE ALLERGY STATUS: ICD-10-CM

## 2021-11-02 DIAGNOSIS — E87.6 HYPOKALEMIA: ICD-10-CM

## 2021-11-02 DIAGNOSIS — Z99.81 DEPENDENCE ON SUPPLEMENTAL OXYGEN: ICD-10-CM

## 2021-11-02 DIAGNOSIS — F31.9 BIPOLAR DISORDER, UNSPECIFIED: ICD-10-CM

## 2021-11-02 DIAGNOSIS — J44.0 CHRONIC OBSTRUCTIVE PULMONARY DISEASE WITH (ACUTE) LOWER RESPIRATORY INFECTION: ICD-10-CM

## 2021-11-02 DIAGNOSIS — N39.0 URINARY TRACT INFECTION, SITE NOT SPECIFIED: ICD-10-CM

## 2021-11-02 DIAGNOSIS — N28.1 CYST OF KIDNEY, ACQUIRED: ICD-10-CM

## 2021-11-02 DIAGNOSIS — J15.6 PNEUMONIA DUE TO OTHER GRAM-NEGATIVE BACTERIA: ICD-10-CM

## 2021-11-02 DIAGNOSIS — I42.9 CARDIOMYOPATHY, UNSPECIFIED: ICD-10-CM

## 2021-11-02 DIAGNOSIS — I11.0 HYPERTENSIVE HEART DISEASE WITH HEART FAILURE: ICD-10-CM

## 2021-11-02 DIAGNOSIS — Z86.16 PERSONAL HISTORY OF COVID-19: ICD-10-CM

## 2021-11-02 DIAGNOSIS — J44.1 CHRONIC OBSTRUCTIVE PULMONARY DISEASE WITH (ACUTE) EXACERBATION: ICD-10-CM

## 2021-11-02 DIAGNOSIS — R65.21 SEVERE SEPSIS WITH SEPTIC SHOCK: ICD-10-CM

## 2021-11-02 DIAGNOSIS — A41.9 SEPSIS, UNSPECIFIED ORGANISM: ICD-10-CM

## 2021-11-02 DIAGNOSIS — B96.20 UNSPECIFIED ESCHERICHIA COLI [E. COLI] AS THE CAUSE OF DISEASES CLASSIFIED ELSEWHERE: ICD-10-CM

## 2021-11-02 DIAGNOSIS — Z88.5 ALLERGY STATUS TO NARCOTIC AGENT: ICD-10-CM

## 2021-11-23 ENCOUNTER — APPOINTMENT (OUTPATIENT)
Dept: CARDIOLOGY | Facility: CLINIC | Age: 76
End: 2021-11-23
Payer: MEDICARE

## 2021-11-23 VITALS
BODY MASS INDEX: 34.96 KG/M2 | DIASTOLIC BLOOD PRESSURE: 80 MMHG | HEIGHT: 62 IN | WEIGHT: 190 LBS | SYSTOLIC BLOOD PRESSURE: 132 MMHG

## 2021-11-23 PROCEDURE — 99214 OFFICE O/P EST MOD 30 MIN: CPT | Mod: CS

## 2021-11-23 RX ORDER — CETIRIZINE HYDROCHLORIDE 10 MG/1
10 TABLET, COATED ORAL
Refills: 0 | Status: ACTIVE | COMMUNITY

## 2021-11-23 RX ORDER — LORAZEPAM 0.5 MG/1
0.5 TABLET ORAL
Refills: 0 | Status: ACTIVE | COMMUNITY
Start: 2019-08-22

## 2021-11-23 RX ORDER — GABAPENTIN 400 MG/1
400 CAPSULE ORAL 3 TIMES DAILY
Refills: 0 | Status: ACTIVE | COMMUNITY
Start: 2021-09-23

## 2021-11-23 RX ORDER — GABAPENTIN 300 MG/1
300 CAPSULE ORAL
Qty: 120 | Refills: 0 | Status: ACTIVE | COMMUNITY
Start: 2021-06-07

## 2021-11-23 RX ORDER — PANTOPRAZOLE 40 MG/1
40 TABLET, DELAYED RELEASE ORAL DAILY
Refills: 0 | Status: ACTIVE | COMMUNITY

## 2021-11-23 NOTE — REVIEW OF SYSTEMS
[Fever] : no fever [Blurry Vision] : no blurred vision [Hearing Loss] : no hearing loss [Sore Throat] : no sore throat [SOB] : no shortness of breath [Chest Discomfort] : no chest discomfort [Palpitations] : no palpitations [Cough] : no cough [Wheezing] : no wheezing [Abdominal Pain] : no abdominal pain [Diarrhea] : diarrhea [Constipation] : no constipation [Dysuria] : no dysuria [Joint Pain] : joint pain [Rash] : no rash [Skin Lesions] : no skin lesions [Dizziness] : no dizziness [Negative] : Heme/Lymph [FreeTextEntry9] : c/o mild left hand and right hip pain

## 2021-11-23 NOTE — PHYSICAL EXAM
[Normal Venous Pressure] : normal venous pressure [Normal Appearance] : was normal in appearance [JVP Elevated ___cm] : the JVP was not elevated [5th Left ICS - MCL] : palpated at the 5th LICS in the midclavicular line [Rhythm Regular] : regular [Normal S1] : normal S1 [Normal S2] : normal S2 [S3] : no S3 [S4] : no S4 [I] : a grade 1 [___ +] : bilateral [unfilled]U+ pitting edema to the ankles [2+] : left 2+ [Right Carotid Bruit] : no bruit heard over the right carotid [Left Carotid Bruit] : no bruit heard over the left carotid [No Abnormalities] : the abdominal aorta was not enlarged and no bruit was heard [Normal Rate] : the respiratory rate was normal [Decreased Breath Sounds] : breath sounds were decreased diffusely [Normal] : alert and oriented, normal memory

## 2021-11-23 NOTE — HISTORY OF PRESENT ILLNESS
[FreeTextEntry1] : Symptoms:  dyspnea on exertion and fatigue, but no chest pain and no edema. Mass neck. Told benign. To not remove per ENT. On now 24 hour o2\par Admitted 10/21 sepsis uti pneumonia. Cath 10/21 no significant cad\par \par \par \par \par \par \par \par

## 2021-11-23 NOTE — DISCUSSION/SUMMARY
[FreeTextEntry1] : \par History htn copd emphysema inga sees now Alexander.  . In hosp 11/18 hypotesion.. Was on verapramil Now off. Use to smoke. LVH EF 55%. Told need 2 gm na diet. Now edema resolved.  Had TIA. On home O2. She 12/20 covid pneumonia . She was on vent. Had neg stress in [place] 10/20. L carotid done by maddie 12/5/20.  She gained 1 pound. Told resume  weight loss. ? pulmonary rehab.. Memory better.  Told try walk.  Mass neck. Told benign. To not remove per ENT now\par Admitted 10/21 sepsis uti pneumonia. Cath 10/21 no significant cad. Patient sob off o2.\par Marked decreased BS. Told see pulmonary. Ef 55- 60%. She had 2 covid vaccines. She has afib on AC

## 2021-12-16 ENCOUNTER — APPOINTMENT (OUTPATIENT)
Dept: PULMONOLOGY | Facility: CLINIC | Age: 76
End: 2021-12-16
Payer: MEDICARE

## 2021-12-16 VITALS
BODY MASS INDEX: 34.96 KG/M2 | SYSTOLIC BLOOD PRESSURE: 130 MMHG | DIASTOLIC BLOOD PRESSURE: 88 MMHG | WEIGHT: 190 LBS | HEIGHT: 62 IN | RESPIRATION RATE: 14 BRPM | HEART RATE: 73 BPM | OXYGEN SATURATION: 97 %

## 2021-12-16 DIAGNOSIS — N39.0 URINARY TRACT INFECTION, SITE NOT SPECIFIED: ICD-10-CM

## 2021-12-16 PROCEDURE — 99215 OFFICE O/P EST HI 40 MIN: CPT | Mod: CS

## 2021-12-16 NOTE — ASSESSMENT
[FreeTextEntry1] : A:\par COPD  \par COVID pneumonia\par plan:\par Stress compliance with inhalers. Renewed as needed.\par cont PRN albuterol\par cont ICS/LABA\par O2 24/7\par weight loss\par \par \par A:\par FLAVIO\par Obesity\par \par PLAN:\par The patient is benefitting from the PAP device .\par New supplies ordered \par Weight loss discussed\par I stressed the need maintain compliance  with the PAP device.\par The patient is not to use an Ozone or UV sterilizer. \par \par \par UTI\par Cipro 500 BID for 3 days\par F/U in 6 months\par \par She needs aggressive care by cardiology.\par I do not think she is ready for rehab at this point we will see her again in 3 months for follow-up\par \par \par \par

## 2021-12-16 NOTE — HISTORY OF PRESENT ILLNESS
[TextBox_4] : I reviewed my original evaluation  and last notes.\par \par I personally reviewed the hospital record and I interpreted the most recent available chest films from the hospital.\par \par I reviewed the cardiology consultants notes.\par \par

## 2021-12-16 NOTE — PHYSICAL EXAM
[No Acute Distress] : no acute distress [Normal Oropharynx] : normal oropharynx [Normal Appearance] : normal appearance [No Neck Mass] : no neck mass [Normal Rate/Rhythm] : normal rate/rhythm [Normal S1, S2] : normal s1, s2 [No Murmurs] : no murmurs [No Resp Distress] : no resp distress [Clear to Auscultation Bilaterally] : clear to auscultation bilaterally [No Abnormalities] : no abnormalities [Benign] : benign [Normal Gait] : normal gait [No Clubbing] : no clubbing [No Cyanosis] : no cyanosis [No Edema] : no edema [FROM] : FROM [Normal Color/ Pigmentation] : normal color/ pigmentation [No Focal Deficits] : no focal deficits [Oriented x3] : oriented x3 [Normal Affect] : normal affect [TextBox_68] : decreased BS

## 2021-12-16 NOTE — REASON FOR VISIT
[Follow-Up] : a follow-up visit [Abnormal CXR/ Chest CT] : an abnormal CXR/ chest CT [COPD] : COPD [TextBox_44] : The patient was recently intubated for the Covid pneumonia.  She spent 4 days on the ventilator.  She was then eventually taken off and discharge.  She never was back to normal.  She then developed an episode of tachycardia where she was found to be in rapid atrial fibrillation up to about 220.  They had a lot of difficulty getting her heart under control.  It appears that she had elements of pulmonary edema on top of the Covid pneumonia.\par \par She is not back to her baseline.  She is wearing her oxygen at 3 given her good saturation but if she takes it off at all she will drop into the mid 80s.  She is taking her medications.  She is also complaining of UTI symptomatology and is requesting antibiotic

## 2021-12-20 NOTE — ED ADULT TRIAGE NOTE - BP NONINVASIVE DIASTOLIC (MM HG)
87
Pt is a 64 y/o F with pmh of Bipolar disorder, HTN, HLD, Hypothyroidism, who presented to the ED BIBEMS due to AMS. Patient is from a "Level 2 conjugate program" for people with mental disabilities. At the time of exam patient was AO x 0, arousable but lethargic, s/p Haldol and ativan for episode of agitation. Most information obtained from ED note and , Michelle [phone]. Pt is AO x 2 at baseline, for the past 2 weeks there have been changes in her activity and mental status, becoming more confused, not walking or doing much, having gait instability. Today patient was found to be sitting in her own urine, and when told so by the , she hadn't realized it. As per Michelle, no Fevers or chills reported, no sick contacts, no complains that patient expressed, other than the noticeable change in mental status. Patient was admitted at [facility] form 4/19/21 to 5/28/21 due to change in behavior, not able to care for herself and not taking her medications. Since then patient has required more assistance and they have people give her the medications twice a day. She also hasn't been eating well for the past 2 weeks.     In ED CT scan negative for acute disease, UA + for infection. WBC of 13, vira. S/p Rocephin, and haldold/ativan for episode of agitation.

## 2021-12-23 ENCOUNTER — RX RENEWAL (OUTPATIENT)
Age: 76
End: 2021-12-23

## 2022-01-01 ENCOUNTER — APPOINTMENT (OUTPATIENT)
Dept: CARDIOLOGY | Facility: CLINIC | Age: 77
End: 2022-01-01

## 2022-01-01 ENCOUNTER — RX RENEWAL (OUTPATIENT)
Age: 77
End: 2022-01-01

## 2022-01-01 ENCOUNTER — INPATIENT (INPATIENT)
Facility: HOSPITAL | Age: 77
LOS: 2 days | Discharge: HOME | End: 2022-10-21
Attending: HOSPITALIST | Admitting: HOSPITALIST

## 2022-01-01 ENCOUNTER — APPOINTMENT (OUTPATIENT)
Age: 77
End: 2022-01-01

## 2022-01-01 ENCOUNTER — APPOINTMENT (OUTPATIENT)
Dept: UROLOGY | Facility: CLINIC | Age: 77
End: 2022-01-01

## 2022-01-01 ENCOUNTER — TRANSCRIPTION ENCOUNTER (OUTPATIENT)
Age: 77
End: 2022-01-01

## 2022-01-01 ENCOUNTER — EMERGENCY (EMERGENCY)
Facility: HOSPITAL | Age: 77
LOS: 0 days | Discharge: HOME | End: 2022-08-23
Attending: EMERGENCY MEDICINE | Admitting: EMERGENCY MEDICINE

## 2022-01-01 ENCOUNTER — INPATIENT (INPATIENT)
Facility: HOSPITAL | Age: 77
LOS: 12 days | End: 2022-12-28
Attending: INTERNAL MEDICINE | Admitting: INTERNAL MEDICINE
Payer: MEDICARE

## 2022-01-01 ENCOUNTER — INPATIENT (INPATIENT)
Facility: HOSPITAL | Age: 77
LOS: 4 days | Discharge: HOME HEALTH | End: 2022-06-23
Attending: FAMILY MEDICINE | Admitting: FAMILY MEDICINE
Payer: MEDICARE

## 2022-01-01 ENCOUNTER — APPOINTMENT (OUTPATIENT)
Dept: VASCULAR SURGERY | Facility: CLINIC | Age: 77
End: 2022-01-01

## 2022-01-01 ENCOUNTER — APPOINTMENT (OUTPATIENT)
Age: 77
End: 2022-01-01
Payer: MEDICARE

## 2022-01-01 ENCOUNTER — NON-APPOINTMENT (OUTPATIENT)
Age: 77
End: 2022-01-01

## 2022-01-01 ENCOUNTER — APPOINTMENT (OUTPATIENT)
Dept: CARE COORDINATION | Facility: HOME HEALTH | Age: 77
End: 2022-01-01

## 2022-01-01 ENCOUNTER — APPOINTMENT (OUTPATIENT)
Dept: CARDIOLOGY | Facility: CLINIC | Age: 77
End: 2022-01-01
Payer: MEDICARE

## 2022-01-01 ENCOUNTER — INPATIENT (INPATIENT)
Facility: HOSPITAL | Age: 77
LOS: 2 days | Discharge: HOME | End: 2022-05-21
Attending: INTERNAL MEDICINE | Admitting: INTERNAL MEDICINE
Payer: MEDICARE

## 2022-01-01 VITALS
OXYGEN SATURATION: 94 % | HEIGHT: 63 IN | SYSTOLIC BLOOD PRESSURE: 95 MMHG | HEART RATE: 118 BPM | WEIGHT: 179.9 LBS | RESPIRATION RATE: 26 BRPM | DIASTOLIC BLOOD PRESSURE: 54 MMHG | TEMPERATURE: 102 F

## 2022-01-01 VITALS
DIASTOLIC BLOOD PRESSURE: 66 MMHG | SYSTOLIC BLOOD PRESSURE: 131 MMHG | WEIGHT: 185 LBS | BODY MASS INDEX: 34.04 KG/M2 | HEIGHT: 62 IN | RESPIRATION RATE: 16 BRPM | HEART RATE: 68 BPM

## 2022-01-01 VITALS
HEART RATE: 68 BPM | WEIGHT: 188 LBS | DIASTOLIC BLOOD PRESSURE: 74 MMHG | BODY MASS INDEX: 34.6 KG/M2 | HEIGHT: 62 IN | RESPIRATION RATE: 15 BRPM | SYSTOLIC BLOOD PRESSURE: 144 MMHG

## 2022-01-01 VITALS
OXYGEN SATURATION: 94 % | WEIGHT: 184.97 LBS | HEIGHT: 62 IN | DIASTOLIC BLOOD PRESSURE: 65 MMHG | HEART RATE: 94 BPM | SYSTOLIC BLOOD PRESSURE: 137 MMHG | RESPIRATION RATE: 20 BRPM

## 2022-01-01 VITALS
HEART RATE: 74 BPM | DIASTOLIC BLOOD PRESSURE: 75 MMHG | HEIGHT: 62 IN | SYSTOLIC BLOOD PRESSURE: 137 MMHG | BODY MASS INDEX: 34.6 KG/M2 | WEIGHT: 188 LBS

## 2022-01-01 VITALS
HEIGHT: 62 IN | WEIGHT: 193 LBS | BODY MASS INDEX: 35.51 KG/M2 | HEART RATE: 63 BPM | RESPIRATION RATE: 15 BRPM | DIASTOLIC BLOOD PRESSURE: 78 MMHG | SYSTOLIC BLOOD PRESSURE: 120 MMHG

## 2022-01-01 VITALS
DIASTOLIC BLOOD PRESSURE: 68 MMHG | TEMPERATURE: 96 F | SYSTOLIC BLOOD PRESSURE: 148 MMHG | HEART RATE: 64 BPM | RESPIRATION RATE: 18 BRPM

## 2022-01-01 VITALS
DIASTOLIC BLOOD PRESSURE: 99 MMHG | OXYGEN SATURATION: 94 % | RESPIRATION RATE: 24 BRPM | TEMPERATURE: 98 F | HEART RATE: 102 BPM | WEIGHT: 199.96 LBS | HEIGHT: 62 IN | SYSTOLIC BLOOD PRESSURE: 221 MMHG

## 2022-01-01 VITALS
HEART RATE: 101 BPM | TEMPERATURE: 101 F | HEIGHT: 62 IN | OXYGEN SATURATION: 98 % | SYSTOLIC BLOOD PRESSURE: 225 MMHG | DIASTOLIC BLOOD PRESSURE: 98 MMHG | RESPIRATION RATE: 20 BRPM | WEIGHT: 179.9 LBS

## 2022-01-01 VITALS
DIASTOLIC BLOOD PRESSURE: 59 MMHG | OXYGEN SATURATION: 96 % | RESPIRATION RATE: 18 BRPM | HEART RATE: 88 BPM | SYSTOLIC BLOOD PRESSURE: 116 MMHG | TEMPERATURE: 98 F

## 2022-01-01 VITALS
BODY MASS INDEX: 34.04 KG/M2 | SYSTOLIC BLOOD PRESSURE: 114 MMHG | HEART RATE: 70 BPM | RESPIRATION RATE: 15 BRPM | DIASTOLIC BLOOD PRESSURE: 72 MMHG | HEIGHT: 62 IN | WEIGHT: 185 LBS

## 2022-01-01 VITALS
DIASTOLIC BLOOD PRESSURE: 60 MMHG | WEIGHT: 186.95 LBS | HEART RATE: 78 BPM | HEIGHT: 62 IN | OXYGEN SATURATION: 97 % | SYSTOLIC BLOOD PRESSURE: 130 MMHG | RESPIRATION RATE: 20 BRPM | TEMPERATURE: 97 F

## 2022-01-01 VITALS
SYSTOLIC BLOOD PRESSURE: 120 MMHG | DIASTOLIC BLOOD PRESSURE: 58 MMHG | BODY MASS INDEX: 33.86 KG/M2 | WEIGHT: 184 LBS | HEIGHT: 62 IN

## 2022-01-01 VITALS
SYSTOLIC BLOOD PRESSURE: 113 MMHG | HEIGHT: 62 IN | BODY MASS INDEX: 34.6 KG/M2 | HEART RATE: 76 BPM | WEIGHT: 188 LBS | DIASTOLIC BLOOD PRESSURE: 68 MMHG

## 2022-01-01 VITALS
RESPIRATION RATE: 16 BRPM | TEMPERATURE: 99 F | DIASTOLIC BLOOD PRESSURE: 57 MMHG | HEART RATE: 68 BPM | SYSTOLIC BLOOD PRESSURE: 123 MMHG

## 2022-01-01 VITALS
RESPIRATION RATE: 18 BRPM | DIASTOLIC BLOOD PRESSURE: 68 MMHG | OXYGEN SATURATION: 98 % | SYSTOLIC BLOOD PRESSURE: 134 MMHG | HEART RATE: 80 BPM

## 2022-01-01 VITALS
DIASTOLIC BLOOD PRESSURE: 80 MMHG | WEIGHT: 188 LBS | BODY MASS INDEX: 34.6 KG/M2 | HEIGHT: 62 IN | SYSTOLIC BLOOD PRESSURE: 120 MMHG

## 2022-01-01 DIAGNOSIS — G47.33 OBSTRUCTIVE SLEEP APNEA (ADULT) (PEDIATRIC): ICD-10-CM

## 2022-01-01 DIAGNOSIS — N17.9 ACUTE KIDNEY FAILURE, UNSPECIFIED: ICD-10-CM

## 2022-01-01 DIAGNOSIS — J43.9 EMPHYSEMA, UNSPECIFIED: ICD-10-CM

## 2022-01-01 DIAGNOSIS — I25.10 ATHEROSCLEROTIC HEART DISEASE OF NATIVE CORONARY ARTERY W/OUT ANGINA PECTORIS: ICD-10-CM

## 2022-01-01 DIAGNOSIS — J98.4 COVID-19: ICD-10-CM

## 2022-01-01 DIAGNOSIS — Z79.899 OTHER LONG TERM (CURRENT) DRUG THERAPY: ICD-10-CM

## 2022-01-01 DIAGNOSIS — Z88.5 ALLERGY STATUS TO NARCOTIC AGENT: ICD-10-CM

## 2022-01-01 DIAGNOSIS — J44.9 CHRONIC OBSTRUCTIVE PULMONARY DISEASE, UNSPECIFIED: ICD-10-CM

## 2022-01-01 DIAGNOSIS — N30.00 ACUTE CYSTITIS WITHOUT HEMATURIA: ICD-10-CM

## 2022-01-01 DIAGNOSIS — J93.9 PNEUMOTHORAX, UNSPECIFIED: ICD-10-CM

## 2022-01-01 DIAGNOSIS — I42.9 CARDIOMYOPATHY, UNSPECIFIED: ICD-10-CM

## 2022-01-01 DIAGNOSIS — R26.2 DIFFICULTY IN WALKING, NOT ELSEWHERE CLASSIFIED: ICD-10-CM

## 2022-01-01 DIAGNOSIS — I10 ESSENTIAL (PRIMARY) HYPERTENSION: ICD-10-CM

## 2022-01-01 DIAGNOSIS — Z91.041 RADIOGRAPHIC DYE ALLERGY STATUS: ICD-10-CM

## 2022-01-01 DIAGNOSIS — J96.10 CHRONIC RESPIRATORY FAILURE, UNSPECIFIED WHETHER WITH HYPOXIA OR HYPERCAPNIA: ICD-10-CM

## 2022-01-01 DIAGNOSIS — I12.9 HYPERTENSIVE CHRONIC KIDNEY DISEASE WITH STAGE 1 THROUGH STAGE 4 CHRONIC KIDNEY DISEASE, OR UNSPECIFIED CHRONIC KIDNEY DISEASE: ICD-10-CM

## 2022-01-01 DIAGNOSIS — N18.9 CHRONIC KIDNEY DISEASE, UNSPECIFIED: ICD-10-CM

## 2022-01-01 DIAGNOSIS — R47.01 APHASIA: ICD-10-CM

## 2022-01-01 DIAGNOSIS — Z79.01 LONG TERM (CURRENT) USE OF ANTICOAGULANTS: ICD-10-CM

## 2022-01-01 DIAGNOSIS — G45.9 TRANSIENT CEREBRAL ISCHEMIC ATTACK, UNSPECIFIED: ICD-10-CM

## 2022-01-01 DIAGNOSIS — N39.0 URINARY TRACT INFECTION, SITE NOT SPECIFIED: ICD-10-CM

## 2022-01-01 DIAGNOSIS — Z88.8 ALLERGY STATUS TO OTHER DRUGS, MEDICAMENTS AND BIOLOGICAL SUBSTANCES: ICD-10-CM

## 2022-01-01 DIAGNOSIS — S80.01XA CONTUSION OF RIGHT KNEE, INITIAL ENCOUNTER: ICD-10-CM

## 2022-01-01 DIAGNOSIS — I25.10 ATHEROSCLEROTIC HEART DISEASE OF NATIVE CORONARY ARTERY WITHOUT ANGINA PECTORIS: ICD-10-CM

## 2022-01-01 DIAGNOSIS — I50.9 HEART FAILURE, UNSPECIFIED: ICD-10-CM

## 2022-01-01 DIAGNOSIS — U07.1 COVID-19: ICD-10-CM

## 2022-01-01 DIAGNOSIS — Z79.51 LONG TERM (CURRENT) USE OF INHALED STEROIDS: ICD-10-CM

## 2022-01-01 DIAGNOSIS — F41.9 ANXIETY DISORDER, UNSPECIFIED: ICD-10-CM

## 2022-01-01 DIAGNOSIS — R55 SYNCOPE AND COLLAPSE: ICD-10-CM

## 2022-01-01 DIAGNOSIS — Z91.81 HISTORY OF FALLING: ICD-10-CM

## 2022-01-01 DIAGNOSIS — Z88.8 ALLERGY STATUS TO OTHER DRUGS, MEDICAMENTS AND BIOLOGICAL SUBSTANCES STATUS: ICD-10-CM

## 2022-01-01 DIAGNOSIS — F32.A DEPRESSION, UNSPECIFIED: ICD-10-CM

## 2022-01-01 DIAGNOSIS — S00.12XA CONTUSION OF LEFT EYELID AND PERIOCULAR AREA, INITIAL ENCOUNTER: ICD-10-CM

## 2022-01-01 DIAGNOSIS — Z87.440 PERSONAL HISTORY OF URINARY (TRACT) INFECTIONS: ICD-10-CM

## 2022-01-01 DIAGNOSIS — Z86.73 PERSONAL HISTORY OF TRANSIENT ISCHEMIC ATTACK (TIA), AND CEREBRAL INFARCTION WITHOUT RESIDUAL DEFICITS: ICD-10-CM

## 2022-01-01 DIAGNOSIS — E78.5 HYPERLIPIDEMIA, UNSPECIFIED: ICD-10-CM

## 2022-01-01 DIAGNOSIS — Z90.49 ACQUIRED ABSENCE OF OTHER SPECIFIED PARTS OF DIGESTIVE TRACT: Chronic | ICD-10-CM

## 2022-01-01 DIAGNOSIS — I73.9 PERIPHERAL VASCULAR DISEASE, UNSPECIFIED: ICD-10-CM

## 2022-01-01 DIAGNOSIS — R10.9 UNSPECIFIED ABDOMINAL PAIN: ICD-10-CM

## 2022-01-01 DIAGNOSIS — Z86.16 PERSONAL HISTORY OF COVID-19: ICD-10-CM

## 2022-01-01 DIAGNOSIS — I42.8 OTHER CARDIOMYOPATHIES: ICD-10-CM

## 2022-01-01 DIAGNOSIS — R30.0 DYSURIA: ICD-10-CM

## 2022-01-01 DIAGNOSIS — Z99.81 DEPENDENCE ON SUPPLEMENTAL OXYGEN: ICD-10-CM

## 2022-01-01 DIAGNOSIS — E66.01 MORBID (SEVERE) OBESITY DUE TO EXCESS CALORIES: ICD-10-CM

## 2022-01-01 DIAGNOSIS — Z98.62 PERIPHERAL VASCULAR ANGIOPLASTY STATUS: Chronic | ICD-10-CM

## 2022-01-01 DIAGNOSIS — N28.1 CYST OF KIDNEY, ACQUIRED: ICD-10-CM

## 2022-01-01 DIAGNOSIS — I48.19 OTHER PERSISTENT ATRIAL FIBRILLATION: ICD-10-CM

## 2022-01-01 DIAGNOSIS — E66.9 OBESITY, UNSPECIFIED: ICD-10-CM

## 2022-01-01 DIAGNOSIS — Z00.00 ENCOUNTER FOR GENERAL ADULT MEDICAL EXAMINATION W/OUT ABNORMAL FINDINGS: ICD-10-CM

## 2022-01-01 DIAGNOSIS — K38.1 APPENDICULAR CONCRETIONS: ICD-10-CM

## 2022-01-01 DIAGNOSIS — R53.1 WEAKNESS: ICD-10-CM

## 2022-01-01 DIAGNOSIS — M79.669 PAIN IN UNSPECIFIED LOWER LEG: ICD-10-CM

## 2022-01-01 DIAGNOSIS — M25.561 PAIN IN RIGHT KNEE: ICD-10-CM

## 2022-01-01 DIAGNOSIS — J96.01 ACUTE RESPIRATORY FAILURE WITH HYPOXIA: ICD-10-CM

## 2022-01-01 DIAGNOSIS — A41.51 SEPSIS DUE TO ESCHERICHIA COLI [E. COLI]: ICD-10-CM

## 2022-01-01 DIAGNOSIS — I11.0 HYPERTENSIVE HEART DISEASE WITH HEART FAILURE: ICD-10-CM

## 2022-01-01 DIAGNOSIS — Z87.891 PERSONAL HISTORY OF NICOTINE DEPENDENCE: ICD-10-CM

## 2022-01-01 DIAGNOSIS — E78.00 PURE HYPERCHOLESTEROLEMIA, UNSPECIFIED: ICD-10-CM

## 2022-01-01 DIAGNOSIS — I25.2 OLD MYOCARDIAL INFARCTION: ICD-10-CM

## 2022-01-01 DIAGNOSIS — F32.9 MAJOR DEPRESSIVE DISORDER, SINGLE EPISODE, UNSPECIFIED: ICD-10-CM

## 2022-01-01 DIAGNOSIS — I48.91 UNSPECIFIED ATRIAL FIBRILLATION: ICD-10-CM
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
## 2022-01-01 DIAGNOSIS — Z86.79 PERSONAL HISTORY OF OTHER DISEASES OF THE CIRCULATORY SYSTEM: ICD-10-CM

## 2022-01-01 DIAGNOSIS — I77.1 STRICTURE OF ARTERY: ICD-10-CM

## 2022-01-01 DIAGNOSIS — I73.00 RAYNAUD'S SYNDROME WITHOUT GANGRENE: ICD-10-CM

## 2022-01-01 DIAGNOSIS — N18.31 CHRONIC KIDNEY DISEASE, STAGE 3A: ICD-10-CM

## 2022-01-01 DIAGNOSIS — R47.1 DYSARTHRIA AND ANARTHRIA: ICD-10-CM

## 2022-01-01 DIAGNOSIS — Z79.82 LONG TERM (CURRENT) USE OF ASPIRIN: ICD-10-CM

## 2022-01-01 DIAGNOSIS — Z98.61 CORONARY ANGIOPLASTY STATUS: ICD-10-CM

## 2022-01-01 DIAGNOSIS — J18.9 PNEUMONIA, UNSPECIFIED ORGANISM: ICD-10-CM

## 2022-01-01 DIAGNOSIS — I42.9 HEART FAILURE, UNSPECIFIED: ICD-10-CM

## 2022-01-01 DIAGNOSIS — R47.81 SLURRED SPEECH: ICD-10-CM

## 2022-01-01 DIAGNOSIS — A41.9 SEPSIS, UNSPECIFIED ORGANISM: ICD-10-CM

## 2022-01-01 DIAGNOSIS — Z90.49 ACQUIRED ABSENCE OF OTHER SPECIFIED PARTS OF DIGESTIVE TRACT: ICD-10-CM

## 2022-01-01 DIAGNOSIS — F31.9 BIPOLAR DISORDER, UNSPECIFIED: ICD-10-CM

## 2022-01-01 DIAGNOSIS — M19.90 UNSPECIFIED OSTEOARTHRITIS, UNSPECIFIED SITE: ICD-10-CM

## 2022-01-01 DIAGNOSIS — Z51.5 ENCOUNTER FOR PALLIATIVE CARE: ICD-10-CM

## 2022-01-01 DIAGNOSIS — I65.23 OCCLUSION AND STENOSIS OF BILATERAL CAROTID ARTERIES: ICD-10-CM

## 2022-01-01 DIAGNOSIS — J12.82 COVID-19: ICD-10-CM

## 2022-01-01 DIAGNOSIS — R06.02 SHORTNESS OF BREATH: ICD-10-CM

## 2022-01-01 DIAGNOSIS — D64.9 ANEMIA, UNSPECIFIED: ICD-10-CM

## 2022-01-01 DIAGNOSIS — S80.02XA CONTUSION OF LEFT KNEE, INITIAL ENCOUNTER: ICD-10-CM

## 2022-01-01 DIAGNOSIS — I07.1 RHEUMATIC TRICUSPID INSUFFICIENCY: ICD-10-CM

## 2022-01-01 DIAGNOSIS — T42.75XA ADVERSE EFFECT OF UNSPECIFIED ANTIEPILEPTIC AND SEDATIVE-HYPNOTIC DRUGS, INITIAL ENCOUNTER: ICD-10-CM

## 2022-01-01 DIAGNOSIS — R39.9 UNSPECIFIED SYMPTOMS AND SIGNS INVOLVING THE GENITOURINARY SYSTEM: ICD-10-CM

## 2022-01-01 DIAGNOSIS — Z88.1 ALLERGY STATUS TO OTHER ANTIBIOTIC AGENTS STATUS: ICD-10-CM

## 2022-01-01 DIAGNOSIS — I13.0 HYPERTENSIVE HEART AND CHRONIC KIDNEY DISEASE WITH HEART FAILURE AND STAGE 1 THROUGH STAGE 4 CHRONIC KIDNEY DISEASE, OR UNSPECIFIED CHRONIC KIDNEY DISEASE: ICD-10-CM

## 2022-01-01 DIAGNOSIS — K56.41 FECAL IMPACTION: ICD-10-CM

## 2022-01-01 DIAGNOSIS — I27.20 PULMONARY HYPERTENSION, UNSPECIFIED: ICD-10-CM

## 2022-01-01 DIAGNOSIS — E86.0 DEHYDRATION: ICD-10-CM

## 2022-01-01 DIAGNOSIS — B96.1 KLEBSIELLA PNEUMONIAE [K. PNEUMONIAE] AS THE CAUSE OF DISEASES CLASSIFIED ELSEWHERE: ICD-10-CM

## 2022-01-01 DIAGNOSIS — I48.0 PAROXYSMAL ATRIAL FIBRILLATION: ICD-10-CM

## 2022-01-01 LAB
-  AMIKACIN: SIGNIFICANT CHANGE UP
-  AMOXICILLIN/CLAVULANIC ACID: SIGNIFICANT CHANGE UP
-  AMPICILLIN/SULBACTAM: SIGNIFICANT CHANGE UP
-  AMPICILLIN: SIGNIFICANT CHANGE UP
-  AZTREONAM: SIGNIFICANT CHANGE UP
-  CEFAZOLIN: SIGNIFICANT CHANGE UP
-  CEFEPIME: SIGNIFICANT CHANGE UP
-  CEFOTAXIME: SIGNIFICANT CHANGE UP
-  CEFOXITIN: SIGNIFICANT CHANGE UP
-  CEFTAZIDIME: SIGNIFICANT CHANGE UP
-  CEFTRIAXONE: SIGNIFICANT CHANGE UP
-  CEFUROXIME: SIGNIFICANT CHANGE UP
-  CIPROFLOXACIN: SIGNIFICANT CHANGE UP
-  ERTAPENEM: SIGNIFICANT CHANGE UP
-  GENTAMICIN: SIGNIFICANT CHANGE UP
-  IMIPENEM: SIGNIFICANT CHANGE UP
-  LEVOFLOXACIN: SIGNIFICANT CHANGE UP
-  MEROPENEM: SIGNIFICANT CHANGE UP
-  MINOCYCLINE: SIGNIFICANT CHANGE UP
-  NITROFURANTOIN: SIGNIFICANT CHANGE UP
-  PIPERACILLIN/TAZOBACTAM: SIGNIFICANT CHANGE UP
-  TETRACYCLINE: SIGNIFICANT CHANGE UP
-  TIGECYCLINE: SIGNIFICANT CHANGE UP
-  TOBRAMYCIN: SIGNIFICANT CHANGE UP
-  TRIMETHOPRIM/SULFAMETHOXAZOLE: SIGNIFICANT CHANGE UP
-  VANCOMYCIN: SIGNIFICANT CHANGE UP
A1C WITH ESTIMATED AVERAGE GLUCOSE RESULT: 5.5 % — SIGNIFICANT CHANGE UP (ref 4–5.6)
ALBUMIN SERPL ELPH-MCNC: 2.7 G/DL — LOW (ref 3.5–5.2)
ALBUMIN SERPL ELPH-MCNC: 2.8 G/DL — LOW (ref 3.5–5.2)
ALBUMIN SERPL ELPH-MCNC: 2.9 G/DL — LOW (ref 3.5–5.2)
ALBUMIN SERPL ELPH-MCNC: 2.9 G/DL — LOW (ref 3.5–5.2)
ALBUMIN SERPL ELPH-MCNC: 3 G/DL — LOW (ref 3.5–5.2)
ALBUMIN SERPL ELPH-MCNC: 3.1 G/DL — LOW (ref 3.5–5.2)
ALBUMIN SERPL ELPH-MCNC: 3.4 G/DL — LOW (ref 3.5–5.2)
ALBUMIN SERPL ELPH-MCNC: 3.5 G/DL — SIGNIFICANT CHANGE UP (ref 3.5–5.2)
ALBUMIN SERPL ELPH-MCNC: 3.7 G/DL — SIGNIFICANT CHANGE UP (ref 3.5–5.2)
ALBUMIN SERPL ELPH-MCNC: 3.8 G/DL — SIGNIFICANT CHANGE UP (ref 3.5–5.2)
ALBUMIN SERPL ELPH-MCNC: 3.9 G/DL — SIGNIFICANT CHANGE UP (ref 3.5–5.2)
ALBUMIN SERPL ELPH-MCNC: 4.1 G/DL — SIGNIFICANT CHANGE UP (ref 3.5–5.2)
ALBUMIN SERPL ELPH-MCNC: 4.2 G/DL — SIGNIFICANT CHANGE UP (ref 3.5–5.2)
ALBUMIN SERPL ELPH-MCNC: 4.2 G/DL — SIGNIFICANT CHANGE UP (ref 3.5–5.2)
ALBUMIN SERPL ELPH-MCNC: 4.3 G/DL — SIGNIFICANT CHANGE UP (ref 3.5–5.2)
ALBUMIN SERPL ELPH-MCNC: 4.5 G/DL — SIGNIFICANT CHANGE UP (ref 3.5–5.2)
ALP SERPL-CCNC: 153 U/L — HIGH (ref 30–115)
ALP SERPL-CCNC: 170 U/L — HIGH (ref 30–115)
ALP SERPL-CCNC: 175 U/L — HIGH (ref 30–115)
ALP SERPL-CCNC: 188 U/L — HIGH (ref 30–115)
ALP SERPL-CCNC: 212 U/L — HIGH (ref 30–115)
ALP SERPL-CCNC: 216 U/L — HIGH (ref 30–115)
ALP SERPL-CCNC: 217 U/L — HIGH (ref 30–115)
ALP SERPL-CCNC: 235 U/L — HIGH (ref 30–115)
ALP SERPL-CCNC: 237 U/L — HIGH (ref 30–115)
ALP SERPL-CCNC: 242 U/L — HIGH (ref 30–115)
ALP SERPL-CCNC: 263 U/L — HIGH (ref 30–115)
ALP SERPL-CCNC: 280 U/L — HIGH (ref 30–115)
ALP SERPL-CCNC: 284 U/L — HIGH (ref 30–115)
ALP SERPL-CCNC: 303 U/L — HIGH (ref 30–115)
ALP SERPL-CCNC: 306 U/L — HIGH (ref 30–115)
ALP SERPL-CCNC: 359 U/L — HIGH (ref 30–115)
ALP SERPL-CCNC: 370 U/L — HIGH (ref 30–115)
ALP SERPL-CCNC: 420 U/L — HIGH (ref 30–115)
ALP SERPL-CCNC: 498 U/L — HIGH (ref 30–115)
ALP SERPL-CCNC: 538 U/L — HIGH (ref 30–115)
ALT FLD-CCNC: 1023 U/L — HIGH (ref 0–41)
ALT FLD-CCNC: 119 U/L — HIGH (ref 0–41)
ALT FLD-CCNC: 12 U/L — SIGNIFICANT CHANGE UP (ref 0–41)
ALT FLD-CCNC: 14 U/L — SIGNIFICANT CHANGE UP (ref 0–41)
ALT FLD-CCNC: 15 U/L — SIGNIFICANT CHANGE UP (ref 0–41)
ALT FLD-CCNC: 152 U/L — HIGH (ref 0–41)
ALT FLD-CCNC: 17 U/L — SIGNIFICANT CHANGE UP (ref 0–41)
ALT FLD-CCNC: 179 U/L — HIGH (ref 0–41)
ALT FLD-CCNC: 21 U/L — SIGNIFICANT CHANGE UP (ref 0–41)
ALT FLD-CCNC: 23 U/L — SIGNIFICANT CHANGE UP (ref 0–41)
ALT FLD-CCNC: 252 U/L — HIGH (ref 0–41)
ALT FLD-CCNC: 26 U/L — SIGNIFICANT CHANGE UP (ref 0–41)
ALT FLD-CCNC: 27 U/L — SIGNIFICANT CHANGE UP (ref 0–41)
ALT FLD-CCNC: 31 U/L — SIGNIFICANT CHANGE UP (ref 0–41)
ALT FLD-CCNC: 402 U/L — HIGH (ref 0–41)
ALT FLD-CCNC: 611 U/L — HIGH (ref 0–41)
ALT FLD-CCNC: 760 U/L — HIGH (ref 0–41)
ALT FLD-CCNC: 761 U/L — HIGH (ref 0–41)
ANION GAP SERPL CALC-SCNC: 10 MMOL/L — SIGNIFICANT CHANGE UP (ref 7–14)
ANION GAP SERPL CALC-SCNC: 11 MMOL/L — SIGNIFICANT CHANGE UP (ref 7–14)
ANION GAP SERPL CALC-SCNC: 12 MMOL/L — SIGNIFICANT CHANGE UP (ref 7–14)
ANION GAP SERPL CALC-SCNC: 13 MMOL/L — SIGNIFICANT CHANGE UP (ref 7–14)
ANION GAP SERPL CALC-SCNC: 14 MMOL/L — SIGNIFICANT CHANGE UP (ref 7–14)
ANION GAP SERPL CALC-SCNC: 15 MMOL/L — HIGH (ref 7–14)
ANION GAP SERPL CALC-SCNC: 17 MMOL/L — HIGH (ref 7–14)
ANION GAP SERPL CALC-SCNC: 3 MMOL/L — LOW (ref 7–14)
ANION GAP SERPL CALC-SCNC: 5 MMOL/L — LOW (ref 7–14)
ANION GAP SERPL CALC-SCNC: 5 MMOL/L — LOW (ref 7–14)
ANION GAP SERPL CALC-SCNC: 6 MMOL/L — LOW (ref 7–14)
ANION GAP SERPL CALC-SCNC: 7 MMOL/L — SIGNIFICANT CHANGE UP (ref 7–14)
ANION GAP SERPL CALC-SCNC: 7 MMOL/L — SIGNIFICANT CHANGE UP (ref 7–14)
ANION GAP SERPL CALC-SCNC: 8 MMOL/L — SIGNIFICANT CHANGE UP (ref 7–14)
ANION GAP SERPL CALC-SCNC: 8 MMOL/L — SIGNIFICANT CHANGE UP (ref 7–14)
ANION GAP SERPL CALC-SCNC: 9 MMOL/L — SIGNIFICANT CHANGE UP (ref 7–14)
APPEARANCE UR: ABNORMAL
APPEARANCE UR: CLEAR — SIGNIFICANT CHANGE UP
APTT BLD: 26 SEC — LOW (ref 27–39.2)
APTT BLD: 26.1 SEC — LOW (ref 27–39.2)
APTT BLD: 29.3 SEC — SIGNIFICANT CHANGE UP (ref 27–39.2)
APTT BLD: 33.6 SEC — SIGNIFICANT CHANGE UP (ref 27–39.2)
APTT BLD: 34.2 SEC — SIGNIFICANT CHANGE UP (ref 27–39.2)
APTT BLD: 36.5 SEC — SIGNIFICANT CHANGE UP (ref 27–39.2)
APTT BLD: 38.9 SEC — SIGNIFICANT CHANGE UP (ref 27–39.2)
AST SERPL-CCNC: 132 U/L — HIGH (ref 0–41)
AST SERPL-CCNC: 2075 U/L — HIGH (ref 0–41)
AST SERPL-CCNC: 23 U/L — SIGNIFICANT CHANGE UP (ref 0–41)
AST SERPL-CCNC: 25 U/L — SIGNIFICANT CHANGE UP (ref 0–41)
AST SERPL-CCNC: 30 U/L — SIGNIFICANT CHANGE UP (ref 0–41)
AST SERPL-CCNC: 35 U/L — SIGNIFICANT CHANGE UP (ref 0–41)
AST SERPL-CCNC: 395 U/L — HIGH (ref 0–41)
AST SERPL-CCNC: 41 U/L — SIGNIFICANT CHANGE UP (ref 0–41)
AST SERPL-CCNC: 44 U/L — HIGH (ref 0–41)
AST SERPL-CCNC: 48 U/L — HIGH (ref 0–41)
AST SERPL-CCNC: 48 U/L — HIGH (ref 0–41)
AST SERPL-CCNC: 49 U/L — HIGH (ref 0–41)
AST SERPL-CCNC: 52 U/L — HIGH (ref 0–41)
AST SERPL-CCNC: 53 U/L — HIGH (ref 0–41)
AST SERPL-CCNC: 54 U/L — HIGH (ref 0–41)
AST SERPL-CCNC: 57 U/L — HIGH (ref 0–41)
AST SERPL-CCNC: 62 U/L — HIGH (ref 0–41)
AST SERPL-CCNC: 647 U/L — HIGH (ref 0–41)
AST SERPL-CCNC: 69 U/L — HIGH (ref 0–41)
AST SERPL-CCNC: 982 U/L — HIGH (ref 0–41)
BACTERIA # UR AUTO: ABNORMAL
BASE EXCESS BLDA CALC-SCNC: -0.5 MMOL/L — SIGNIFICANT CHANGE UP (ref -2–3)
BASE EXCESS BLDA CALC-SCNC: -0.8 MMOL/L — SIGNIFICANT CHANGE UP (ref -2–3)
BASE EXCESS BLDA CALC-SCNC: -1.5 MMOL/L — SIGNIFICANT CHANGE UP (ref -2–3)
BASE EXCESS BLDA CALC-SCNC: -1.5 MMOL/L — SIGNIFICANT CHANGE UP (ref -2–3)
BASE EXCESS BLDA CALC-SCNC: -2.9 MMOL/L — LOW (ref -2–3)
BASE EXCESS BLDA CALC-SCNC: -3 MMOL/L — LOW (ref -2–3)
BASE EXCESS BLDA CALC-SCNC: -5.5 MMOL/L — LOW (ref -2–3)
BASE EXCESS BLDA CALC-SCNC: -6.3 MMOL/L — LOW (ref -2–3)
BASE EXCESS BLDA CALC-SCNC: 1.5 MMOL/L — SIGNIFICANT CHANGE UP (ref -2–3)
BASE EXCESS BLDA CALC-SCNC: 2.5 MMOL/L — SIGNIFICANT CHANGE UP (ref -2–3)
BASE EXCESS BLDA CALC-SCNC: 2.8 MMOL/L — SIGNIFICANT CHANGE UP (ref -2–3)
BASE EXCESS BLDA CALC-SCNC: 4.2 MMOL/L — HIGH (ref -2–3)
BASE EXCESS BLDA CALC-SCNC: 4.5 MMOL/L — HIGH (ref -2–3)
BASE EXCESS BLDA CALC-SCNC: 5.4 MMOL/L — HIGH (ref -2–3)
BASE EXCESS BLDA CALC-SCNC: 5.4 MMOL/L — HIGH (ref -2–3)
BASE EXCESS BLDA CALC-SCNC: 5.8 MMOL/L — HIGH (ref -2–3)
BASE EXCESS BLDA CALC-SCNC: 7.2 MMOL/L — HIGH (ref -2–3)
BASE EXCESS BLDV CALC-SCNC: -19 MMOL/L — LOW (ref -2–3)
BASE EXCESS BLDV CALC-SCNC: 5.6 MMOL/L — HIGH (ref -2–3)
BASE EXCESS BLDV CALC-SCNC: 8.5 MMOL/L — HIGH (ref -2–3)
BASOPHILS # BLD AUTO: 0 K/UL — SIGNIFICANT CHANGE UP (ref 0–0.2)
BASOPHILS # BLD AUTO: 0.01 K/UL — SIGNIFICANT CHANGE UP (ref 0–0.2)
BASOPHILS # BLD AUTO: 0.02 K/UL — SIGNIFICANT CHANGE UP (ref 0–0.2)
BASOPHILS # BLD AUTO: 0.03 K/UL — SIGNIFICANT CHANGE UP (ref 0–0.2)
BASOPHILS # BLD AUTO: 0.04 K/UL — SIGNIFICANT CHANGE UP (ref 0–0.2)
BASOPHILS # BLD AUTO: 0.04 K/UL — SIGNIFICANT CHANGE UP (ref 0–0.2)
BASOPHILS # BLD AUTO: 0.05 K/UL — SIGNIFICANT CHANGE UP (ref 0–0.2)
BASOPHILS NFR BLD AUTO: 0 % — SIGNIFICANT CHANGE UP (ref 0–1)
BASOPHILS NFR BLD AUTO: 0.1 % — SIGNIFICANT CHANGE UP (ref 0–1)
BASOPHILS NFR BLD AUTO: 0.2 % — SIGNIFICANT CHANGE UP (ref 0–1)
BASOPHILS NFR BLD AUTO: 0.2 % — SIGNIFICANT CHANGE UP (ref 0–1)
BASOPHILS NFR BLD AUTO: 0.3 % — SIGNIFICANT CHANGE UP (ref 0–1)
BASOPHILS NFR BLD AUTO: 0.4 % — SIGNIFICANT CHANGE UP (ref 0–1)
BASOPHILS NFR BLD AUTO: 0.4 % — SIGNIFICANT CHANGE UP (ref 0–1)
BASOPHILS NFR BLD AUTO: 0.5 % — SIGNIFICANT CHANGE UP (ref 0–1)
BASOPHILS NFR BLD AUTO: 0.6 % — SIGNIFICANT CHANGE UP (ref 0–1)
BASOPHILS NFR BLD AUTO: 0.8 % — SIGNIFICANT CHANGE UP (ref 0–1)
BILIRUB SERPL-MCNC: 0.2 MG/DL — SIGNIFICANT CHANGE UP (ref 0.2–1.2)
BILIRUB SERPL-MCNC: 0.3 MG/DL — SIGNIFICANT CHANGE UP (ref 0.2–1.2)
BILIRUB SERPL-MCNC: 0.4 MG/DL — SIGNIFICANT CHANGE UP (ref 0.2–1.2)
BILIRUB SERPL-MCNC: 0.6 MG/DL — SIGNIFICANT CHANGE UP (ref 0.2–1.2)
BILIRUB SERPL-MCNC: <0.2 MG/DL — SIGNIFICANT CHANGE UP (ref 0.2–1.2)
BILIRUB UR QL STRIP: NORMAL
BILIRUB UR QL STRIP: NORMAL
BILIRUB UR-MCNC: NEGATIVE — SIGNIFICANT CHANGE UP
BLD GP AB SCN SERPL QL: SIGNIFICANT CHANGE UP
BUN SERPL-MCNC: 13 MG/DL — SIGNIFICANT CHANGE UP (ref 10–20)
BUN SERPL-MCNC: 15 MG/DL — SIGNIFICANT CHANGE UP (ref 10–20)
BUN SERPL-MCNC: 19 MG/DL — SIGNIFICANT CHANGE UP (ref 10–20)
BUN SERPL-MCNC: 19 MG/DL — SIGNIFICANT CHANGE UP (ref 10–20)
BUN SERPL-MCNC: 20 MG/DL — SIGNIFICANT CHANGE UP (ref 10–20)
BUN SERPL-MCNC: 22 MG/DL — HIGH (ref 10–20)
BUN SERPL-MCNC: 23 MG/DL — HIGH (ref 10–20)
BUN SERPL-MCNC: 24 MG/DL — HIGH (ref 10–20)
BUN SERPL-MCNC: 24 MG/DL — HIGH (ref 10–20)
BUN SERPL-MCNC: 25 MG/DL — HIGH (ref 10–20)
BUN SERPL-MCNC: 25 MG/DL — HIGH (ref 10–20)
BUN SERPL-MCNC: 28 MG/DL — HIGH (ref 10–20)
BUN SERPL-MCNC: 29 MG/DL — HIGH (ref 10–20)
BUN SERPL-MCNC: 29 MG/DL — HIGH (ref 10–20)
BUN SERPL-MCNC: 30 MG/DL — HIGH (ref 10–20)
BUN SERPL-MCNC: 32 MG/DL — HIGH (ref 10–20)
BUN SERPL-MCNC: 32 MG/DL — HIGH (ref 10–20)
BUN SERPL-MCNC: 33 MG/DL — HIGH (ref 10–20)
BUN SERPL-MCNC: 34 MG/DL — HIGH (ref 10–20)
BUN SERPL-MCNC: 40 MG/DL — HIGH (ref 10–20)
BUN SERPL-MCNC: 41 MG/DL — HIGH (ref 10–20)
CA-I SERPL-SCNC: 1.33 MMOL/L — SIGNIFICANT CHANGE UP (ref 1.15–1.33)
CA-I SERPL-SCNC: 1.37 MMOL/L — HIGH (ref 1.15–1.33)
CALCIUM SERPL-MCNC: 10.1 MG/DL — SIGNIFICANT CHANGE UP (ref 8.4–10.5)
CALCIUM SERPL-MCNC: 10.4 MG/DL — SIGNIFICANT CHANGE UP (ref 8.4–10.5)
CALCIUM SERPL-MCNC: 10.6 MG/DL — HIGH (ref 8.5–10.1)
CALCIUM SERPL-MCNC: 10.8 MG/DL — HIGH (ref 8.5–10.1)
CALCIUM SERPL-MCNC: 10.8 MG/DL — HIGH (ref 8.5–10.1)
CALCIUM SERPL-MCNC: 11.7 MG/DL — HIGH (ref 8.4–10.5)
CALCIUM SERPL-MCNC: 7.1 MG/DL — LOW (ref 8.4–10.5)
CALCIUM SERPL-MCNC: 7.1 MG/DL — LOW (ref 8.4–10.5)
CALCIUM SERPL-MCNC: 7.3 MG/DL — LOW (ref 8.4–10.4)
CALCIUM SERPL-MCNC: 7.5 MG/DL — LOW (ref 8.4–10.5)
CALCIUM SERPL-MCNC: 7.6 MG/DL — LOW (ref 8.4–10.5)
CALCIUM SERPL-MCNC: 7.7 MG/DL — LOW (ref 8.4–10.5)
CALCIUM SERPL-MCNC: 8.1 MG/DL — LOW (ref 8.4–10.4)
CALCIUM SERPL-MCNC: 8.1 MG/DL — LOW (ref 8.4–10.4)
CALCIUM SERPL-MCNC: 8.3 MG/DL — LOW (ref 8.4–10.5)
CALCIUM SERPL-MCNC: 8.4 MG/DL — LOW (ref 8.5–10.1)
CALCIUM SERPL-MCNC: 8.4 MG/DL — SIGNIFICANT CHANGE UP (ref 8.4–10.4)
CALCIUM SERPL-MCNC: 8.5 MG/DL — SIGNIFICANT CHANGE UP (ref 8.4–10.5)
CALCIUM SERPL-MCNC: 8.5 MG/DL — SIGNIFICANT CHANGE UP (ref 8.4–10.5)
CALCIUM SERPL-MCNC: 8.5 MG/DL — SIGNIFICANT CHANGE UP (ref 8.5–10.1)
CALCIUM SERPL-MCNC: 8.6 MG/DL — SIGNIFICANT CHANGE UP (ref 8.4–10.4)
CALCIUM SERPL-MCNC: 8.7 MG/DL — SIGNIFICANT CHANGE UP (ref 8.5–10.1)
CALCIUM SERPL-MCNC: 8.8 MG/DL — SIGNIFICANT CHANGE UP (ref 8.4–10.5)
CALCIUM SERPL-MCNC: 8.9 MG/DL — SIGNIFICANT CHANGE UP (ref 8.5–10.1)
CALCIUM SERPL-MCNC: 9.2 MG/DL — SIGNIFICANT CHANGE UP (ref 8.4–10.5)
CALCIUM SERPL-MCNC: 9.2 MG/DL — SIGNIFICANT CHANGE UP (ref 8.5–10.1)
CALCIUM SERPL-MCNC: 9.5 MG/DL — SIGNIFICANT CHANGE UP (ref 8.4–10.5)
CALCIUM SERPL-MCNC: 9.6 MG/DL — SIGNIFICANT CHANGE UP (ref 8.5–10.1)
CHLORIDE SERPL-SCNC: 102 MMOL/L — SIGNIFICANT CHANGE UP (ref 98–110)
CHLORIDE SERPL-SCNC: 103 MMOL/L — SIGNIFICANT CHANGE UP (ref 98–110)
CHLORIDE SERPL-SCNC: 104 MMOL/L — SIGNIFICANT CHANGE UP (ref 98–110)
CHLORIDE SERPL-SCNC: 106 MMOL/L — SIGNIFICANT CHANGE UP (ref 98–110)
CHLORIDE SERPL-SCNC: 107 MMOL/L — SIGNIFICANT CHANGE UP (ref 98–110)
CHLORIDE SERPL-SCNC: 108 MMOL/L — SIGNIFICANT CHANGE UP (ref 98–110)
CHLORIDE SERPL-SCNC: 109 MMOL/L — SIGNIFICANT CHANGE UP (ref 98–110)
CHLORIDE SERPL-SCNC: 109 MMOL/L — SIGNIFICANT CHANGE UP (ref 98–110)
CHLORIDE SERPL-SCNC: 112 MMOL/L — HIGH (ref 98–110)
CHLORIDE SERPL-SCNC: 114 MMOL/L — HIGH (ref 98–110)
CHLORIDE SERPL-SCNC: 114 MMOL/L — HIGH (ref 98–110)
CHLORIDE SERPL-SCNC: 96 MMOL/L — LOW (ref 98–110)
CHLORIDE SERPL-SCNC: 98 MMOL/L — SIGNIFICANT CHANGE UP (ref 98–110)
CHLORIDE SERPL-SCNC: 99 MMOL/L — SIGNIFICANT CHANGE UP (ref 98–110)
CK SERPL-CCNC: 57 U/L — SIGNIFICANT CHANGE UP (ref 0–225)
CK SERPL-CCNC: 68 U/L — SIGNIFICANT CHANGE UP (ref 0–225)
CO2 SERPL-SCNC: 19 MMOL/L — SIGNIFICANT CHANGE UP (ref 17–32)
CO2 SERPL-SCNC: 21 MMOL/L — SIGNIFICANT CHANGE UP (ref 17–32)
CO2 SERPL-SCNC: 21 MMOL/L — SIGNIFICANT CHANGE UP (ref 17–32)
CO2 SERPL-SCNC: 22 MMOL/L — SIGNIFICANT CHANGE UP (ref 17–32)
CO2 SERPL-SCNC: 22 MMOL/L — SIGNIFICANT CHANGE UP (ref 17–32)
CO2 SERPL-SCNC: 23 MMOL/L — SIGNIFICANT CHANGE UP (ref 17–32)
CO2 SERPL-SCNC: 25 MMOL/L — SIGNIFICANT CHANGE UP (ref 17–32)
CO2 SERPL-SCNC: 26 MMOL/L — SIGNIFICANT CHANGE UP (ref 17–32)
CO2 SERPL-SCNC: 27 MMOL/L — SIGNIFICANT CHANGE UP (ref 17–32)
CO2 SERPL-SCNC: 29 MMOL/L — SIGNIFICANT CHANGE UP (ref 17–32)
CO2 SERPL-SCNC: 32 MMOL/L — SIGNIFICANT CHANGE UP (ref 17–32)
CO2 SERPL-SCNC: 34 MMOL/L — HIGH (ref 17–32)
COD CRY URNS QL: NEGATIVE — SIGNIFICANT CHANGE UP
COLLECTION METHOD: NORMAL
COLLECTION METHOD: NORMAL
COLOR SPEC: YELLOW — SIGNIFICANT CHANGE UP
CREAT ?TM UR-MCNC: 30 MG/DL — SIGNIFICANT CHANGE UP
CREAT SERPL-MCNC: 0.7 MG/DL — SIGNIFICANT CHANGE UP (ref 0.7–1.5)
CREAT SERPL-MCNC: 0.8 MG/DL — SIGNIFICANT CHANGE UP (ref 0.7–1.5)
CREAT SERPL-MCNC: 0.9 MG/DL — SIGNIFICANT CHANGE UP (ref 0.7–1.5)
CREAT SERPL-MCNC: 1 MG/DL — SIGNIFICANT CHANGE UP (ref 0.7–1.5)
CREAT SERPL-MCNC: 1 MG/DL — SIGNIFICANT CHANGE UP (ref 0.7–1.5)
CREAT SERPL-MCNC: 1.1 MG/DL — SIGNIFICANT CHANGE UP (ref 0.7–1.5)
CREAT SERPL-MCNC: 1.2 MG/DL — SIGNIFICANT CHANGE UP (ref 0.7–1.5)
CREAT SERPL-MCNC: 1.3 MG/DL — SIGNIFICANT CHANGE UP (ref 0.7–1.5)
CREAT SERPL-MCNC: 1.8 MG/DL — HIGH (ref 0.7–1.5)
CULTURE RESULTS: SIGNIFICANT CHANGE UP
D DIMER BLD IA.RAPID-MCNC: 261 NG/ML DDU — HIGH
D DIMER BLD IA.RAPID-MCNC: 854 NG/ML DDU — HIGH
DIFF PNL FLD: ABNORMAL
DIFF PNL FLD: NEGATIVE — SIGNIFICANT CHANGE UP
DIFF PNL FLD: NEGATIVE — SIGNIFICANT CHANGE UP
EGFR: 29 ML/MIN/1.73M2 — LOW
EGFR: 43 ML/MIN/1.73M2 — LOW
EGFR: 47 ML/MIN/1.73M2 — LOW
EGFR: 52 ML/MIN/1.73M2 — LOW
EGFR: 59 ML/MIN/1.73M2 — LOW
EGFR: 59 ML/MIN/1.73M2 — LOW
EGFR: 67 ML/MIN/1.73M2 — SIGNIFICANT CHANGE UP
EGFR: 77 ML/MIN/1.73M2 — SIGNIFICANT CHANGE UP
EGFR: 90 ML/MIN/1.73M2 — SIGNIFICANT CHANGE UP
EOSINOPHIL # BLD AUTO: 0 K/UL — SIGNIFICANT CHANGE UP (ref 0–0.7)
EOSINOPHIL # BLD AUTO: 0.01 K/UL — SIGNIFICANT CHANGE UP (ref 0–0.7)
EOSINOPHIL # BLD AUTO: 0.02 K/UL — SIGNIFICANT CHANGE UP (ref 0–0.7)
EOSINOPHIL # BLD AUTO: 0.03 K/UL — SIGNIFICANT CHANGE UP (ref 0–0.7)
EOSINOPHIL # BLD AUTO: 0.03 K/UL — SIGNIFICANT CHANGE UP (ref 0–0.7)
EOSINOPHIL # BLD AUTO: 0.08 K/UL — SIGNIFICANT CHANGE UP (ref 0–0.7)
EOSINOPHIL # BLD AUTO: 0.14 K/UL — SIGNIFICANT CHANGE UP (ref 0–0.7)
EOSINOPHIL # BLD AUTO: 0.18 K/UL — SIGNIFICANT CHANGE UP (ref 0–0.7)
EOSINOPHIL # BLD AUTO: 0.18 K/UL — SIGNIFICANT CHANGE UP (ref 0–0.7)
EOSINOPHIL # BLD AUTO: 0.2 K/UL — SIGNIFICANT CHANGE UP (ref 0–0.7)
EOSINOPHIL # BLD AUTO: 0.27 K/UL — SIGNIFICANT CHANGE UP (ref 0–0.7)
EOSINOPHIL # BLD AUTO: 0.3 K/UL — SIGNIFICANT CHANGE UP (ref 0–0.7)
EOSINOPHIL # BLD AUTO: 0.31 K/UL — SIGNIFICANT CHANGE UP (ref 0–0.7)
EOSINOPHIL # BLD AUTO: 0.31 K/UL — SIGNIFICANT CHANGE UP (ref 0–0.7)
EOSINOPHIL # BLD AUTO: 0.35 K/UL — SIGNIFICANT CHANGE UP (ref 0–0.7)
EOSINOPHIL # BLD AUTO: 0.39 K/UL — SIGNIFICANT CHANGE UP (ref 0–0.7)
EOSINOPHIL # BLD AUTO: 0.41 K/UL — SIGNIFICANT CHANGE UP (ref 0–0.7)
EOSINOPHIL # BLD AUTO: 0.42 K/UL — SIGNIFICANT CHANGE UP (ref 0–0.7)
EOSINOPHIL # BLD AUTO: 0.48 K/UL — SIGNIFICANT CHANGE UP (ref 0–0.7)
EOSINOPHIL # BLD AUTO: 0.51 K/UL — SIGNIFICANT CHANGE UP (ref 0–0.7)
EOSINOPHIL # BLD AUTO: 0.54 K/UL — SIGNIFICANT CHANGE UP (ref 0–0.7)
EOSINOPHIL NFR BLD AUTO: 0 % — SIGNIFICANT CHANGE UP (ref 0–8)
EOSINOPHIL NFR BLD AUTO: 0.2 % — SIGNIFICANT CHANGE UP (ref 0–8)
EOSINOPHIL NFR BLD AUTO: 0.3 % — SIGNIFICANT CHANGE UP (ref 0–8)
EOSINOPHIL NFR BLD AUTO: 0.3 % — SIGNIFICANT CHANGE UP (ref 0–8)
EOSINOPHIL NFR BLD AUTO: 0.6 % — SIGNIFICANT CHANGE UP (ref 0–8)
EOSINOPHIL NFR BLD AUTO: 0.8 % — SIGNIFICANT CHANGE UP (ref 0–8)
EOSINOPHIL NFR BLD AUTO: 1.2 % — SIGNIFICANT CHANGE UP (ref 0–8)
EOSINOPHIL NFR BLD AUTO: 1.2 % — SIGNIFICANT CHANGE UP (ref 0–8)
EOSINOPHIL NFR BLD AUTO: 2.4 % — SIGNIFICANT CHANGE UP (ref 0–8)
EOSINOPHIL NFR BLD AUTO: 3.3 % — SIGNIFICANT CHANGE UP (ref 0–8)
EOSINOPHIL NFR BLD AUTO: 4 % — SIGNIFICANT CHANGE UP (ref 0–8)
EOSINOPHIL NFR BLD AUTO: 4.1 % — SIGNIFICANT CHANGE UP (ref 0–8)
EOSINOPHIL NFR BLD AUTO: 4.5 % — SIGNIFICANT CHANGE UP (ref 0–8)
EOSINOPHIL NFR BLD AUTO: 4.8 % — SIGNIFICANT CHANGE UP (ref 0–8)
EOSINOPHIL NFR BLD AUTO: 5.1 % — SIGNIFICANT CHANGE UP (ref 0–8)
EOSINOPHIL NFR BLD AUTO: 5.8 % — SIGNIFICANT CHANGE UP (ref 0–8)
EOSINOPHIL NFR BLD AUTO: 6.2 % — SIGNIFICANT CHANGE UP (ref 0–8)
EOSINOPHIL NFR BLD AUTO: 7.1 % — SIGNIFICANT CHANGE UP (ref 0–8)
EOSINOPHIL NFR BLD AUTO: 9 % — HIGH (ref 0–8)
EPI CELLS # UR: 1 /HPF — SIGNIFICANT CHANGE UP (ref 0–5)
EPI CELLS # UR: ABNORMAL /HPF
ESTIMATED AVERAGE GLUCOSE: 111 MG/DL — SIGNIFICANT CHANGE UP (ref 68–114)
ETHANOL SERPL-MCNC: <10 MG/DL — SIGNIFICANT CHANGE UP
FIBRINOGEN PPP-MCNC: 647 MG/DL — HIGH (ref 204.4–570.6)
FLUAV AG NPH QL: SIGNIFICANT CHANGE UP
FLUAV AG NPH QL: SIGNIFICANT CHANGE UP
FLUBV AG NPH QL: SIGNIFICANT CHANGE UP
FLUBV AG NPH QL: SIGNIFICANT CHANGE UP
GAS PNL BLDA: SIGNIFICANT CHANGE UP
GAS PNL BLDV: 139 MMOL/L — SIGNIFICANT CHANGE UP (ref 136–145)
GAS PNL BLDV: 139 MMOL/L — SIGNIFICANT CHANGE UP (ref 136–145)
GAS PNL BLDV: 140 MMOL/L — SIGNIFICANT CHANGE UP (ref 136–145)
GAS PNL BLDV: SIGNIFICANT CHANGE UP
GIANT PLATELETS BLD QL SMEAR: PRESENT — SIGNIFICANT CHANGE UP
GLUCOSE BLDC GLUCOMTR-MCNC: 104 MG/DL — HIGH (ref 70–99)
GLUCOSE BLDC GLUCOMTR-MCNC: 110 MG/DL — HIGH (ref 70–99)
GLUCOSE BLDC GLUCOMTR-MCNC: 115 MG/DL — HIGH (ref 70–99)
GLUCOSE BLDC GLUCOMTR-MCNC: 137 MG/DL — HIGH (ref 70–99)
GLUCOSE BLDC GLUCOMTR-MCNC: 143 MG/DL — HIGH (ref 70–99)
GLUCOSE BLDC GLUCOMTR-MCNC: 144 MG/DL — HIGH (ref 70–99)
GLUCOSE BLDC GLUCOMTR-MCNC: 145 MG/DL — HIGH (ref 70–99)
GLUCOSE BLDC GLUCOMTR-MCNC: 167 MG/DL — HIGH (ref 70–99)
GLUCOSE BLDC GLUCOMTR-MCNC: 94 MG/DL — SIGNIFICANT CHANGE UP (ref 70–99)
GLUCOSE SERPL-MCNC: 100 MG/DL — HIGH (ref 70–99)
GLUCOSE SERPL-MCNC: 100 MG/DL — HIGH (ref 70–99)
GLUCOSE SERPL-MCNC: 105 MG/DL — HIGH (ref 70–99)
GLUCOSE SERPL-MCNC: 111 MG/DL — HIGH (ref 70–99)
GLUCOSE SERPL-MCNC: 113 MG/DL — HIGH (ref 70–99)
GLUCOSE SERPL-MCNC: 115 MG/DL — HIGH (ref 70–99)
GLUCOSE SERPL-MCNC: 120 MG/DL — HIGH (ref 70–99)
GLUCOSE SERPL-MCNC: 120 MG/DL — HIGH (ref 70–99)
GLUCOSE SERPL-MCNC: 121 MG/DL — HIGH (ref 70–99)
GLUCOSE SERPL-MCNC: 122 MG/DL — HIGH (ref 70–99)
GLUCOSE SERPL-MCNC: 122 MG/DL — HIGH (ref 70–99)
GLUCOSE SERPL-MCNC: 127 MG/DL — HIGH (ref 70–99)
GLUCOSE SERPL-MCNC: 132 MG/DL — HIGH (ref 70–99)
GLUCOSE SERPL-MCNC: 133 MG/DL — HIGH (ref 70–99)
GLUCOSE SERPL-MCNC: 137 MG/DL — HIGH (ref 70–99)
GLUCOSE SERPL-MCNC: 141 MG/DL — HIGH (ref 70–99)
GLUCOSE SERPL-MCNC: 148 MG/DL — HIGH (ref 70–99)
GLUCOSE SERPL-MCNC: 186 MG/DL — HIGH (ref 70–99)
GLUCOSE SERPL-MCNC: 202 MG/DL — HIGH (ref 70–99)
GLUCOSE SERPL-MCNC: 82 MG/DL — SIGNIFICANT CHANGE UP (ref 70–99)
GLUCOSE SERPL-MCNC: 82 MG/DL — SIGNIFICANT CHANGE UP (ref 70–99)
GLUCOSE SERPL-MCNC: 83 MG/DL — SIGNIFICANT CHANGE UP (ref 70–99)
GLUCOSE SERPL-MCNC: 83 MG/DL — SIGNIFICANT CHANGE UP (ref 70–99)
GLUCOSE SERPL-MCNC: 87 MG/DL — SIGNIFICANT CHANGE UP (ref 70–99)
GLUCOSE SERPL-MCNC: 87 MG/DL — SIGNIFICANT CHANGE UP (ref 70–99)
GLUCOSE SERPL-MCNC: 88 MG/DL — SIGNIFICANT CHANGE UP (ref 70–99)
GLUCOSE SERPL-MCNC: 89 MG/DL — SIGNIFICANT CHANGE UP (ref 70–99)
GLUCOSE SERPL-MCNC: 94 MG/DL — SIGNIFICANT CHANGE UP (ref 70–99)
GLUCOSE UR QL: NEGATIVE MG/DL — SIGNIFICANT CHANGE UP
GLUCOSE UR QL: NEGATIVE — SIGNIFICANT CHANGE UP
GLUCOSE UR-MCNC: NORMAL
GLUCOSE UR-MCNC: NORMAL
GRAN CASTS # UR COMP ASSIST: NEGATIVE — SIGNIFICANT CHANGE UP
HCG UR QL: 0.2 EU/DL
HCG UR QL: 0.2 EU/DL
HCO3 BLDA-SCNC: 18 MMOL/L — LOW (ref 21–28)
HCO3 BLDA-SCNC: 21 MMOL/L — SIGNIFICANT CHANGE UP (ref 21–28)
HCO3 BLDA-SCNC: 23 MMOL/L — SIGNIFICANT CHANGE UP (ref 21–28)
HCO3 BLDA-SCNC: 24 MMOL/L — SIGNIFICANT CHANGE UP (ref 21–28)
HCO3 BLDA-SCNC: 25 MMOL/L — SIGNIFICANT CHANGE UP (ref 21–28)
HCO3 BLDA-SCNC: 25 MMOL/L — SIGNIFICANT CHANGE UP (ref 21–28)
HCO3 BLDA-SCNC: 27 MMOL/L — SIGNIFICANT CHANGE UP (ref 21–28)
HCO3 BLDA-SCNC: 27 MMOL/L — SIGNIFICANT CHANGE UP (ref 21–28)
HCO3 BLDA-SCNC: 28 MMOL/L — SIGNIFICANT CHANGE UP (ref 21–28)
HCO3 BLDA-SCNC: 29 MMOL/L — HIGH (ref 21–28)
HCO3 BLDA-SCNC: 29 MMOL/L — HIGH (ref 21–28)
HCO3 BLDA-SCNC: 30 MMOL/L — HIGH (ref 21–28)
HCO3 BLDA-SCNC: 30 MMOL/L — HIGH (ref 21–28)
HCO3 BLDA-SCNC: 31 MMOL/L — HIGH (ref 21–28)
HCO3 BLDA-SCNC: 31 MMOL/L — HIGH (ref 21–28)
HCO3 BLDV-SCNC: 16 MMOL/L — LOW (ref 22–29)
HCO3 BLDV-SCNC: 32 MMOL/L — HIGH (ref 22–29)
HCO3 BLDV-SCNC: 35 MMOL/L — HIGH (ref 22–29)
HCT VFR BLD CALC: 22.8 % — LOW (ref 37–47)
HCT VFR BLD CALC: 25.4 % — LOW (ref 37–47)
HCT VFR BLD CALC: 27 % — LOW (ref 37–47)
HCT VFR BLD CALC: 27.7 % — LOW (ref 37–47)
HCT VFR BLD CALC: 28.5 % — LOW (ref 37–47)
HCT VFR BLD CALC: 28.6 % — LOW (ref 37–47)
HCT VFR BLD CALC: 28.7 % — LOW (ref 37–47)
HCT VFR BLD CALC: 29.1 % — LOW (ref 37–47)
HCT VFR BLD CALC: 29.8 % — LOW (ref 37–47)
HCT VFR BLD CALC: 29.8 % — LOW (ref 37–47)
HCT VFR BLD CALC: 30.1 % — LOW (ref 37–47)
HCT VFR BLD CALC: 30.5 % — LOW (ref 37–47)
HCT VFR BLD CALC: 30.6 % — LOW (ref 37–47)
HCT VFR BLD CALC: 31.2 % — LOW (ref 37–47)
HCT VFR BLD CALC: 31.3 % — LOW (ref 37–47)
HCT VFR BLD CALC: 31.4 % — LOW (ref 37–47)
HCT VFR BLD CALC: 31.4 % — LOW (ref 37–47)
HCT VFR BLD CALC: 31.7 % — LOW (ref 37–47)
HCT VFR BLD CALC: 32.4 % — LOW (ref 37–47)
HCT VFR BLD CALC: 32.8 % — LOW (ref 37–47)
HCT VFR BLD CALC: 33 % — LOW (ref 37–47)
HCT VFR BLD CALC: 33.7 % — LOW (ref 37–47)
HCT VFR BLD CALC: 33.7 % — LOW (ref 37–47)
HCT VFR BLD CALC: 34.1 % — LOW (ref 37–47)
HCT VFR BLD CALC: 34.6 % — LOW (ref 37–47)
HCT VFR BLD CALC: 34.9 % — LOW (ref 37–47)
HCT VFR BLD CALC: 35 % — LOW (ref 37–47)
HCT VFR BLD CALC: 35.7 % — LOW (ref 37–47)
HCT VFR BLD CALC: 36 % — LOW (ref 37–47)
HCT VFR BLD CALC: 36.2 % — LOW (ref 37–47)
HCT VFR BLD CALC: 36.5 % — LOW (ref 37–47)
HCT VFR BLDA CALC: 35 % — LOW (ref 39–51)
HCT VFR BLDA CALC: 39 % — SIGNIFICANT CHANGE UP (ref 39–51)
HEPARIN-PF4 AB RESULT: <0.6 U/ML — SIGNIFICANT CHANGE UP (ref 0–0.9)
HGB BLD CALC-MCNC: 11.5 G/DL — LOW (ref 12.6–17.4)
HGB BLD CALC-MCNC: 12.9 G/DL — SIGNIFICANT CHANGE UP (ref 12.6–17.4)
HGB BLD-MCNC: 10 G/DL — LOW (ref 12–16)
HGB BLD-MCNC: 10.2 G/DL — LOW (ref 12–16)
HGB BLD-MCNC: 10.2 G/DL — LOW (ref 12–16)
HGB BLD-MCNC: 10.6 G/DL — LOW (ref 12–16)
HGB BLD-MCNC: 10.7 G/DL — LOW (ref 12–16)
HGB BLD-MCNC: 11 G/DL — LOW (ref 12–16)
HGB BLD-MCNC: 11.2 G/DL — LOW (ref 12–16)
HGB BLD-MCNC: 11.3 G/DL — LOW (ref 12–16)
HGB BLD-MCNC: 11.4 G/DL — LOW (ref 12–16)
HGB BLD-MCNC: 11.5 G/DL — LOW (ref 12–16)
HGB BLD-MCNC: 11.5 G/DL — LOW (ref 12–16)
HGB BLD-MCNC: 6.9 G/DL — CRITICAL LOW (ref 12–16)
HGB BLD-MCNC: 8 G/DL — LOW (ref 12–16)
HGB BLD-MCNC: 8.5 G/DL — LOW (ref 12–16)
HGB BLD-MCNC: 8.6 G/DL — LOW (ref 12–16)
HGB BLD-MCNC: 8.9 G/DL — LOW (ref 12–16)
HGB BLD-MCNC: 9 G/DL — LOW (ref 12–16)
HGB BLD-MCNC: 9.1 G/DL — LOW (ref 12–16)
HGB BLD-MCNC: 9.2 G/DL — LOW (ref 12–16)
HGB BLD-MCNC: 9.2 G/DL — LOW (ref 12–16)
HGB BLD-MCNC: 9.4 G/DL — LOW (ref 12–16)
HGB BLD-MCNC: 9.5 G/DL — LOW (ref 12–16)
HGB BLD-MCNC: 9.6 G/DL — LOW (ref 12–16)
HGB BLD-MCNC: 9.7 G/DL — LOW (ref 12–16)
HGB BLD-MCNC: 9.8 G/DL — LOW (ref 12–16)
HGB UR QL STRIP.AUTO: NORMAL
HGB UR QL STRIP.AUTO: NORMAL
HOROWITZ INDEX BLDA+IHG-RTO: 100 — SIGNIFICANT CHANGE UP
HOROWITZ INDEX BLDA+IHG-RTO: 32 — SIGNIFICANT CHANGE UP
HOROWITZ INDEX BLDA+IHG-RTO: 40 — SIGNIFICANT CHANGE UP
HOROWITZ INDEX BLDA+IHG-RTO: 50 — SIGNIFICANT CHANGE UP
HOROWITZ INDEX BLDA+IHG-RTO: 60 — SIGNIFICANT CHANGE UP
HOROWITZ INDEX BLDA+IHG-RTO: 60 — SIGNIFICANT CHANGE UP
HYALINE CASTS # UR AUTO: 0 /LPF — SIGNIFICANT CHANGE UP (ref 0–7)
HYALINE CASTS # UR AUTO: ABNORMAL /LPF
HYALINE CASTS # UR AUTO: NEGATIVE — SIGNIFICANT CHANGE UP
IMM GRANULOCYTES NFR BLD AUTO: 0.2 % — SIGNIFICANT CHANGE UP (ref 0.1–0.3)
IMM GRANULOCYTES NFR BLD AUTO: 0.3 % — SIGNIFICANT CHANGE UP (ref 0.1–0.3)
IMM GRANULOCYTES NFR BLD AUTO: 0.4 % — HIGH (ref 0.1–0.3)
IMM GRANULOCYTES NFR BLD AUTO: 0.5 % — HIGH (ref 0.1–0.3)
IMM GRANULOCYTES NFR BLD AUTO: 0.6 % — HIGH (ref 0.1–0.3)
IMM GRANULOCYTES NFR BLD AUTO: 0.7 % — HIGH (ref 0.1–0.3)
IMM GRANULOCYTES NFR BLD AUTO: 0.8 % — HIGH (ref 0.1–0.3)
IMM GRANULOCYTES NFR BLD AUTO: 0.8 % — HIGH (ref 0.1–0.3)
IMM GRANULOCYTES NFR BLD AUTO: 1 % — HIGH (ref 0.1–0.3)
IMM GRANULOCYTES NFR BLD AUTO: 1.1 % — HIGH (ref 0.1–0.3)
INR BLD: 1.22 RATIO — SIGNIFICANT CHANGE UP (ref 0.65–1.3)
INR BLD: 1.33 RATIO — HIGH (ref 0.65–1.3)
INR BLD: 1.34 RATIO — HIGH (ref 0.65–1.3)
INR BLD: 1.46 RATIO — HIGH (ref 0.65–1.3)
INR BLD: 1.54 RATIO — HIGH (ref 0.65–1.3)
INR BLD: 1.57 RATIO — HIGH (ref 0.65–1.3)
INR BLD: 1.67 RATIO — HIGH (ref 0.65–1.3)
KETONES UR-MCNC: NEGATIVE — SIGNIFICANT CHANGE UP
KETONES UR-MCNC: NORMAL
KETONES UR-MCNC: NORMAL
LACTATE BLDV-MCNC: 1.5 MMOL/L — SIGNIFICANT CHANGE UP (ref 0.5–2)
LACTATE BLDV-MCNC: 1.5 MMOL/L — SIGNIFICANT CHANGE UP (ref 0.5–2)
LACTATE BLDV-MCNC: 10.9 MMOL/L — CRITICAL HIGH (ref 0.5–2)
LACTATE SERPL-SCNC: 1.6 MMOL/L — SIGNIFICANT CHANGE UP (ref 0.7–2)
LACTATE SERPL-SCNC: 1.6 MMOL/L — SIGNIFICANT CHANGE UP (ref 0.7–2)
LACTATE SERPL-SCNC: 1.8 MMOL/L — SIGNIFICANT CHANGE UP (ref 0.7–2)
LAMOTRIGINE SERPL-MCNC: 3.2 UG/ML — SIGNIFICANT CHANGE UP (ref 2–20)
LEGIONELLA AG UR QL: NEGATIVE — SIGNIFICANT CHANGE UP
LEUKOCYTE ESTERASE UR QL STRIP: NORMAL
LEUKOCYTE ESTERASE UR QL STRIP: NORMAL
LEUKOCYTE ESTERASE UR-ACNC: ABNORMAL
LIDOCAIN IGE QN: 31 U/L — SIGNIFICANT CHANGE UP (ref 7–60)
LIDOCAIN IGE QN: 37 U/L — SIGNIFICANT CHANGE UP (ref 7–60)
LYMPHOCYTES # BLD AUTO: 0.26 K/UL — LOW (ref 1.2–3.4)
LYMPHOCYTES # BLD AUTO: 0.45 K/UL — LOW (ref 1.2–3.4)
LYMPHOCYTES # BLD AUTO: 0.51 K/UL — LOW (ref 1.2–3.4)
LYMPHOCYTES # BLD AUTO: 0.53 K/UL — LOW (ref 1.2–3.4)
LYMPHOCYTES # BLD AUTO: 0.61 K/UL — LOW (ref 1.2–3.4)
LYMPHOCYTES # BLD AUTO: 0.82 K/UL — LOW (ref 1.2–3.4)
LYMPHOCYTES # BLD AUTO: 0.83 K/UL — LOW (ref 1.2–3.4)
LYMPHOCYTES # BLD AUTO: 0.84 K/UL — LOW (ref 1.2–3.4)
LYMPHOCYTES # BLD AUTO: 0.88 K/UL — LOW (ref 1.2–3.4)
LYMPHOCYTES # BLD AUTO: 0.9 K/UL — LOW (ref 1.2–3.4)
LYMPHOCYTES # BLD AUTO: 0.96 K/UL — LOW (ref 1.2–3.4)
LYMPHOCYTES # BLD AUTO: 0.97 K/UL — LOW (ref 1.2–3.4)
LYMPHOCYTES # BLD AUTO: 1.09 K/UL — LOW (ref 1.2–3.4)
LYMPHOCYTES # BLD AUTO: 1.14 K/UL — LOW (ref 1.2–3.4)
LYMPHOCYTES # BLD AUTO: 1.16 K/UL — LOW (ref 1.2–3.4)
LYMPHOCYTES # BLD AUTO: 1.2 K/UL — SIGNIFICANT CHANGE UP (ref 1.2–3.4)
LYMPHOCYTES # BLD AUTO: 1.36 K/UL — SIGNIFICANT CHANGE UP (ref 1.2–3.4)
LYMPHOCYTES # BLD AUTO: 1.4 K/UL — SIGNIFICANT CHANGE UP (ref 1.2–3.4)
LYMPHOCYTES # BLD AUTO: 1.65 K/UL — SIGNIFICANT CHANGE UP (ref 1.2–3.4)
LYMPHOCYTES # BLD AUTO: 1.84 K/UL — SIGNIFICANT CHANGE UP (ref 1.2–3.4)
LYMPHOCYTES # BLD AUTO: 1.99 K/UL — SIGNIFICANT CHANGE UP (ref 1.2–3.4)
LYMPHOCYTES # BLD AUTO: 10.1 % — LOW (ref 20.5–51.1)
LYMPHOCYTES # BLD AUTO: 10.4 % — LOW (ref 20.5–51.1)
LYMPHOCYTES # BLD AUTO: 11.4 % — LOW (ref 20.5–51.1)
LYMPHOCYTES # BLD AUTO: 11.6 % — LOW (ref 20.5–51.1)
LYMPHOCYTES # BLD AUTO: 13 % — LOW (ref 20.5–51.1)
LYMPHOCYTES # BLD AUTO: 13.2 % — LOW (ref 20.5–51.1)
LYMPHOCYTES # BLD AUTO: 13.5 % — LOW (ref 20.5–51.1)
LYMPHOCYTES # BLD AUTO: 15.5 % — LOW (ref 20.5–51.1)
LYMPHOCYTES # BLD AUTO: 16.3 % — LOW (ref 20.5–51.1)
LYMPHOCYTES # BLD AUTO: 17.4 % — LOW (ref 20.5–51.1)
LYMPHOCYTES # BLD AUTO: 17.8 % — LOW (ref 20.5–51.1)
LYMPHOCYTES # BLD AUTO: 18.7 % — LOW (ref 20.5–51.1)
LYMPHOCYTES # BLD AUTO: 2.32 K/UL — SIGNIFICANT CHANGE UP (ref 1.2–3.4)
LYMPHOCYTES # BLD AUTO: 2.62 K/UL — SIGNIFICANT CHANGE UP (ref 1.2–3.4)
LYMPHOCYTES # BLD AUTO: 20.4 % — LOW (ref 20.5–51.1)
LYMPHOCYTES # BLD AUTO: 20.4 % — LOW (ref 20.5–51.1)
LYMPHOCYTES # BLD AUTO: 21 % — SIGNIFICANT CHANGE UP (ref 20.5–51.1)
LYMPHOCYTES # BLD AUTO: 23.6 % — SIGNIFICANT CHANGE UP (ref 20.5–51.1)
LYMPHOCYTES # BLD AUTO: 27.2 % — SIGNIFICANT CHANGE UP (ref 20.5–51.1)
LYMPHOCYTES # BLD AUTO: 30.8 % — SIGNIFICANT CHANGE UP (ref 20.5–51.1)
LYMPHOCYTES # BLD AUTO: 5.1 % — LOW (ref 20.5–51.1)
LYMPHOCYTES # BLD AUTO: 6.2 % — LOW (ref 20.5–51.1)
LYMPHOCYTES # BLD AUTO: 7.8 % — LOW (ref 20.5–51.1)
LYMPHOCYTES # BLD AUTO: 8 % — LOW (ref 20.5–51.1)
LYMPHOCYTES # BLD AUTO: 9.6 % — LOW (ref 20.5–51.1)
MAGNESIUM SERPL-MCNC: 1.7 MG/DL — LOW (ref 1.8–2.4)
MAGNESIUM SERPL-MCNC: 1.8 MG/DL — SIGNIFICANT CHANGE UP (ref 1.8–2.4)
MAGNESIUM SERPL-MCNC: 1.8 MG/DL — SIGNIFICANT CHANGE UP (ref 1.8–2.4)
MAGNESIUM SERPL-MCNC: 1.9 MG/DL — SIGNIFICANT CHANGE UP (ref 1.8–2.4)
MAGNESIUM SERPL-MCNC: 2 MG/DL — SIGNIFICANT CHANGE UP (ref 1.8–2.4)
MAGNESIUM SERPL-MCNC: 2 MG/DL — SIGNIFICANT CHANGE UP (ref 1.8–2.4)
MAGNESIUM SERPL-MCNC: 2.1 MG/DL — SIGNIFICANT CHANGE UP (ref 1.8–2.4)
MAGNESIUM SERPL-MCNC: 2.2 MG/DL — SIGNIFICANT CHANGE UP (ref 1.8–2.4)
MAGNESIUM SERPL-MCNC: 2.3 MG/DL — SIGNIFICANT CHANGE UP (ref 1.8–2.4)
MAGNESIUM SERPL-MCNC: 2.3 MG/DL — SIGNIFICANT CHANGE UP (ref 1.8–2.4)
MAGNESIUM SERPL-MCNC: 2.4 MG/DL — SIGNIFICANT CHANGE UP (ref 1.8–2.4)
MAGNESIUM SERPL-MCNC: 2.5 MG/DL — HIGH (ref 1.8–2.4)
MAGNESIUM SERPL-MCNC: 2.5 MG/DL — HIGH (ref 1.8–2.4)
MANUAL SMEAR VERIFICATION: SIGNIFICANT CHANGE UP
MCHC RBC-ENTMCNC: 27.1 PG — SIGNIFICANT CHANGE UP (ref 27–31)
MCHC RBC-ENTMCNC: 27.3 PG — SIGNIFICANT CHANGE UP (ref 27–31)
MCHC RBC-ENTMCNC: 27.6 PG — SIGNIFICANT CHANGE UP (ref 27–31)
MCHC RBC-ENTMCNC: 27.8 PG — SIGNIFICANT CHANGE UP (ref 27–31)
MCHC RBC-ENTMCNC: 27.9 PG — SIGNIFICANT CHANGE UP (ref 27–31)
MCHC RBC-ENTMCNC: 28 PG — SIGNIFICANT CHANGE UP (ref 27–31)
MCHC RBC-ENTMCNC: 28.1 PG — SIGNIFICANT CHANGE UP (ref 27–31)
MCHC RBC-ENTMCNC: 28.2 PG — SIGNIFICANT CHANGE UP (ref 27–31)
MCHC RBC-ENTMCNC: 28.3 PG — SIGNIFICANT CHANGE UP (ref 27–31)
MCHC RBC-ENTMCNC: 28.3 PG — SIGNIFICANT CHANGE UP (ref 27–31)
MCHC RBC-ENTMCNC: 28.4 PG — SIGNIFICANT CHANGE UP (ref 27–31)
MCHC RBC-ENTMCNC: 28.4 PG — SIGNIFICANT CHANGE UP (ref 27–31)
MCHC RBC-ENTMCNC: 28.7 PG — SIGNIFICANT CHANGE UP (ref 27–31)
MCHC RBC-ENTMCNC: 29.2 PG — SIGNIFICANT CHANGE UP (ref 27–31)
MCHC RBC-ENTMCNC: 29.6 PG — SIGNIFICANT CHANGE UP (ref 27–31)
MCHC RBC-ENTMCNC: 29.7 PG — SIGNIFICANT CHANGE UP (ref 27–31)
MCHC RBC-ENTMCNC: 29.8 PG — SIGNIFICANT CHANGE UP (ref 27–31)
MCHC RBC-ENTMCNC: 29.9 PG — SIGNIFICANT CHANGE UP (ref 27–31)
MCHC RBC-ENTMCNC: 30.1 G/DL — LOW (ref 32–37)
MCHC RBC-ENTMCNC: 30.2 G/DL — LOW (ref 32–37)
MCHC RBC-ENTMCNC: 30.2 PG — SIGNIFICANT CHANGE UP (ref 27–31)
MCHC RBC-ENTMCNC: 30.3 G/DL — LOW (ref 32–37)
MCHC RBC-ENTMCNC: 30.3 PG — SIGNIFICANT CHANGE UP (ref 27–31)
MCHC RBC-ENTMCNC: 30.4 G/DL — LOW (ref 32–37)
MCHC RBC-ENTMCNC: 30.5 G/DL — LOW (ref 32–37)
MCHC RBC-ENTMCNC: 30.5 PG — SIGNIFICANT CHANGE UP (ref 27–31)
MCHC RBC-ENTMCNC: 30.5 PG — SIGNIFICANT CHANGE UP (ref 27–31)
MCHC RBC-ENTMCNC: 30.6 G/DL — LOW (ref 32–37)
MCHC RBC-ENTMCNC: 30.6 PG — SIGNIFICANT CHANGE UP (ref 27–31)
MCHC RBC-ENTMCNC: 30.7 G/DL — LOW (ref 32–37)
MCHC RBC-ENTMCNC: 30.9 G/DL — LOW (ref 32–37)
MCHC RBC-ENTMCNC: 30.9 G/DL — LOW (ref 32–37)
MCHC RBC-ENTMCNC: 31 G/DL — LOW (ref 32–37)
MCHC RBC-ENTMCNC: 31.1 G/DL — LOW (ref 32–37)
MCHC RBC-ENTMCNC: 31.2 G/DL — LOW (ref 32–37)
MCHC RBC-ENTMCNC: 31.4 G/DL — LOW (ref 32–37)
MCHC RBC-ENTMCNC: 31.4 G/DL — LOW (ref 32–37)
MCHC RBC-ENTMCNC: 31.5 G/DL — LOW (ref 32–37)
MCHC RBC-ENTMCNC: 31.6 G/DL — LOW (ref 32–37)
MCHC RBC-ENTMCNC: 31.7 G/DL — LOW (ref 32–37)
MCHC RBC-ENTMCNC: 31.8 G/DL — LOW (ref 32–37)
MCHC RBC-ENTMCNC: 31.8 G/DL — LOW (ref 32–37)
MCHC RBC-ENTMCNC: 31.8 PG — HIGH (ref 27–31)
MCHC RBC-ENTMCNC: 31.9 G/DL — LOW (ref 32–37)
MCHC RBC-ENTMCNC: 32.2 G/DL — SIGNIFICANT CHANGE UP (ref 32–37)
MCHC RBC-ENTMCNC: 32.6 G/DL — SIGNIFICANT CHANGE UP (ref 32–37)
MCV RBC AUTO: 87.4 FL — SIGNIFICANT CHANGE UP (ref 81–99)
MCV RBC AUTO: 88 FL — SIGNIFICANT CHANGE UP (ref 81–99)
MCV RBC AUTO: 88.8 FL — SIGNIFICANT CHANGE UP (ref 81–99)
MCV RBC AUTO: 88.9 FL — SIGNIFICANT CHANGE UP (ref 81–99)
MCV RBC AUTO: 89.2 FL — SIGNIFICANT CHANGE UP (ref 81–99)
MCV RBC AUTO: 89.3 FL — SIGNIFICANT CHANGE UP (ref 81–99)
MCV RBC AUTO: 89.4 FL — SIGNIFICANT CHANGE UP (ref 81–99)
MCV RBC AUTO: 89.6 FL — SIGNIFICANT CHANGE UP (ref 81–99)
MCV RBC AUTO: 90.3 FL — SIGNIFICANT CHANGE UP (ref 81–99)
MCV RBC AUTO: 90.4 FL — SIGNIFICANT CHANGE UP (ref 81–99)
MCV RBC AUTO: 90.5 FL — SIGNIFICANT CHANGE UP (ref 81–99)
MCV RBC AUTO: 90.8 FL — SIGNIFICANT CHANGE UP (ref 81–99)
MCV RBC AUTO: 91.1 FL — SIGNIFICANT CHANGE UP (ref 81–99)
MCV RBC AUTO: 91.4 FL — SIGNIFICANT CHANGE UP (ref 81–99)
MCV RBC AUTO: 91.6 FL — SIGNIFICANT CHANGE UP (ref 81–99)
MCV RBC AUTO: 92.4 FL — SIGNIFICANT CHANGE UP (ref 81–99)
MCV RBC AUTO: 93 FL — SIGNIFICANT CHANGE UP (ref 81–99)
MCV RBC AUTO: 93.1 FL — SIGNIFICANT CHANGE UP (ref 81–99)
MCV RBC AUTO: 93.8 FL — SIGNIFICANT CHANGE UP (ref 81–99)
MCV RBC AUTO: 94.3 FL — SIGNIFICANT CHANGE UP (ref 81–99)
MCV RBC AUTO: 94.4 FL — SIGNIFICANT CHANGE UP (ref 81–99)
MCV RBC AUTO: 94.4 FL — SIGNIFICANT CHANGE UP (ref 81–99)
MCV RBC AUTO: 96 FL — SIGNIFICANT CHANGE UP (ref 81–99)
MCV RBC AUTO: 96.3 FL — SIGNIFICANT CHANGE UP (ref 81–99)
MCV RBC AUTO: 97 FL — SIGNIFICANT CHANGE UP (ref 81–99)
MCV RBC AUTO: 97.1 FL — SIGNIFICANT CHANGE UP (ref 81–99)
MCV RBC AUTO: 97.4 FL — SIGNIFICANT CHANGE UP (ref 81–99)
MCV RBC AUTO: 97.5 FL — SIGNIFICANT CHANGE UP (ref 81–99)
MCV RBC AUTO: 97.6 FL — SIGNIFICANT CHANGE UP (ref 81–99)
MCV RBC AUTO: 97.8 FL — SIGNIFICANT CHANGE UP (ref 81–99)
MCV RBC AUTO: 98.1 FL — SIGNIFICANT CHANGE UP (ref 81–99)
METAMYELOCYTES # FLD: 0.9 % — HIGH (ref 0–0)
METHOD TYPE: SIGNIFICANT CHANGE UP
MONOCYTES # BLD AUTO: 0.13 K/UL — SIGNIFICANT CHANGE UP (ref 0.1–0.6)
MONOCYTES # BLD AUTO: 0.14 K/UL — SIGNIFICANT CHANGE UP (ref 0.1–0.6)
MONOCYTES # BLD AUTO: 0.24 K/UL — SIGNIFICANT CHANGE UP (ref 0.1–0.6)
MONOCYTES # BLD AUTO: 0.29 K/UL — SIGNIFICANT CHANGE UP (ref 0.1–0.6)
MONOCYTES # BLD AUTO: 0.41 K/UL — SIGNIFICANT CHANGE UP (ref 0.1–0.6)
MONOCYTES # BLD AUTO: 0.46 K/UL — SIGNIFICANT CHANGE UP (ref 0.1–0.6)
MONOCYTES # BLD AUTO: 0.5 K/UL — SIGNIFICANT CHANGE UP (ref 0.1–0.6)
MONOCYTES # BLD AUTO: 0.52 K/UL — SIGNIFICANT CHANGE UP (ref 0.1–0.6)
MONOCYTES # BLD AUTO: 0.55 K/UL — SIGNIFICANT CHANGE UP (ref 0.1–0.6)
MONOCYTES # BLD AUTO: 0.56 K/UL — SIGNIFICANT CHANGE UP (ref 0.1–0.6)
MONOCYTES # BLD AUTO: 0.58 K/UL — SIGNIFICANT CHANGE UP (ref 0.1–0.6)
MONOCYTES # BLD AUTO: 0.6 K/UL — SIGNIFICANT CHANGE UP (ref 0.1–0.6)
MONOCYTES # BLD AUTO: 0.63 K/UL — HIGH (ref 0.1–0.6)
MONOCYTES # BLD AUTO: 0.64 K/UL — HIGH (ref 0.1–0.6)
MONOCYTES # BLD AUTO: 0.68 K/UL — HIGH (ref 0.1–0.6)
MONOCYTES # BLD AUTO: 0.69 K/UL — HIGH (ref 0.1–0.6)
MONOCYTES # BLD AUTO: 0.71 K/UL — HIGH (ref 0.1–0.6)
MONOCYTES # BLD AUTO: 0.91 K/UL — HIGH (ref 0.1–0.6)
MONOCYTES # BLD AUTO: 1.04 K/UL — HIGH (ref 0.1–0.6)
MONOCYTES # BLD AUTO: 1.09 K/UL — HIGH (ref 0.1–0.6)
MONOCYTES # BLD AUTO: 1.46 K/UL — HIGH (ref 0.1–0.6)
MONOCYTES NFR BLD AUTO: 10.4 % — HIGH (ref 1.7–9.3)
MONOCYTES NFR BLD AUTO: 10.5 % — HIGH (ref 1.7–9.3)
MONOCYTES NFR BLD AUTO: 10.8 % — HIGH (ref 1.7–9.3)
MONOCYTES NFR BLD AUTO: 12.1 % — HIGH (ref 1.7–9.3)
MONOCYTES NFR BLD AUTO: 2.6 % — SIGNIFICANT CHANGE UP (ref 1.7–9.3)
MONOCYTES NFR BLD AUTO: 3.2 % — SIGNIFICANT CHANGE UP (ref 1.7–9.3)
MONOCYTES NFR BLD AUTO: 4.5 % — SIGNIFICANT CHANGE UP (ref 1.7–9.3)
MONOCYTES NFR BLD AUTO: 4.7 % — SIGNIFICANT CHANGE UP (ref 1.7–9.3)
MONOCYTES NFR BLD AUTO: 5.3 % — SIGNIFICANT CHANGE UP (ref 1.7–9.3)
MONOCYTES NFR BLD AUTO: 5.4 % — SIGNIFICANT CHANGE UP (ref 1.7–9.3)
MONOCYTES NFR BLD AUTO: 5.7 % — SIGNIFICANT CHANGE UP (ref 1.7–9.3)
MONOCYTES NFR BLD AUTO: 6.4 % — SIGNIFICANT CHANGE UP (ref 1.7–9.3)
MONOCYTES NFR BLD AUTO: 6.8 % — SIGNIFICANT CHANGE UP (ref 1.7–9.3)
MONOCYTES NFR BLD AUTO: 7.4 % — SIGNIFICANT CHANGE UP (ref 1.7–9.3)
MONOCYTES NFR BLD AUTO: 7.6 % — SIGNIFICANT CHANGE UP (ref 1.7–9.3)
MONOCYTES NFR BLD AUTO: 7.6 % — SIGNIFICANT CHANGE UP (ref 1.7–9.3)
MONOCYTES NFR BLD AUTO: 8.5 % — SIGNIFICANT CHANGE UP (ref 1.7–9.3)
MONOCYTES NFR BLD AUTO: 8.7 % — SIGNIFICANT CHANGE UP (ref 1.7–9.3)
MONOCYTES NFR BLD AUTO: 8.8 % — SIGNIFICANT CHANGE UP (ref 1.7–9.3)
MONOCYTES NFR BLD AUTO: 9.6 % — HIGH (ref 1.7–9.3)
MONOCYTES NFR BLD AUTO: 9.8 % — HIGH (ref 1.7–9.3)
MONOCYTES NFR BLD AUTO: 9.9 % — HIGH (ref 1.7–9.3)
MONOCYTES NFR BLD AUTO: 9.9 % — HIGH (ref 1.7–9.3)
MRSA PCR RESULT.: NEGATIVE — SIGNIFICANT CHANGE UP
MRSA PCR RESULT.: NEGATIVE — SIGNIFICANT CHANGE UP
NEUTROPHILS # BLD AUTO: 10.28 K/UL — HIGH (ref 1.4–6.5)
NEUTROPHILS # BLD AUTO: 2.15 K/UL — SIGNIFICANT CHANGE UP (ref 1.4–6.5)
NEUTROPHILS # BLD AUTO: 3.13 K/UL — SIGNIFICANT CHANGE UP (ref 1.4–6.5)
NEUTROPHILS # BLD AUTO: 3.42 K/UL — SIGNIFICANT CHANGE UP (ref 1.4–6.5)
NEUTROPHILS # BLD AUTO: 3.7 K/UL — SIGNIFICANT CHANGE UP (ref 1.4–6.5)
NEUTROPHILS # BLD AUTO: 3.71 K/UL — SIGNIFICANT CHANGE UP (ref 1.4–6.5)
NEUTROPHILS # BLD AUTO: 3.71 K/UL — SIGNIFICANT CHANGE UP (ref 1.4–6.5)
NEUTROPHILS # BLD AUTO: 3.74 K/UL — SIGNIFICANT CHANGE UP (ref 1.4–6.5)
NEUTROPHILS # BLD AUTO: 4.5 K/UL — SIGNIFICANT CHANGE UP (ref 1.4–6.5)
NEUTROPHILS # BLD AUTO: 4.64 K/UL — SIGNIFICANT CHANGE UP (ref 1.4–6.5)
NEUTROPHILS # BLD AUTO: 5.05 K/UL — SIGNIFICANT CHANGE UP (ref 1.4–6.5)
NEUTROPHILS # BLD AUTO: 5.25 K/UL — SIGNIFICANT CHANGE UP (ref 1.4–6.5)
NEUTROPHILS # BLD AUTO: 5.29 K/UL — SIGNIFICANT CHANGE UP (ref 1.4–6.5)
NEUTROPHILS # BLD AUTO: 5.39 K/UL — SIGNIFICANT CHANGE UP (ref 1.4–6.5)
NEUTROPHILS # BLD AUTO: 5.42 K/UL — SIGNIFICANT CHANGE UP (ref 1.4–6.5)
NEUTROPHILS # BLD AUTO: 5.7 K/UL — SIGNIFICANT CHANGE UP (ref 1.4–6.5)
NEUTROPHILS # BLD AUTO: 6.36 K/UL — SIGNIFICANT CHANGE UP (ref 1.4–6.5)
NEUTROPHILS # BLD AUTO: 6.44 K/UL — SIGNIFICANT CHANGE UP (ref 1.4–6.5)
NEUTROPHILS # BLD AUTO: 7.65 K/UL — HIGH (ref 1.4–6.5)
NEUTROPHILS # BLD AUTO: 8.19 K/UL — HIGH (ref 1.4–6.5)
NEUTROPHILS # BLD AUTO: 8.26 K/UL — HIGH (ref 1.4–6.5)
NEUTROPHILS # BLD AUTO: 8.96 K/UL — HIGH (ref 1.4–6.5)
NEUTROPHILS # BLD AUTO: 9.11 K/UL — HIGH (ref 1.4–6.5)
NEUTROPHILS NFR BLD AUTO: 52.8 % — SIGNIFICANT CHANGE UP (ref 42.2–75.2)
NEUTROPHILS NFR BLD AUTO: 56.4 % — SIGNIFICANT CHANGE UP (ref 42.2–75.2)
NEUTROPHILS NFR BLD AUTO: 58.5 % — SIGNIFICANT CHANGE UP (ref 42.2–75.2)
NEUTROPHILS NFR BLD AUTO: 61.2 % — SIGNIFICANT CHANGE UP (ref 42.2–75.2)
NEUTROPHILS NFR BLD AUTO: 61.9 % — SIGNIFICANT CHANGE UP (ref 42.2–75.2)
NEUTROPHILS NFR BLD AUTO: 67.5 % — SIGNIFICANT CHANGE UP (ref 42.2–75.2)
NEUTROPHILS NFR BLD AUTO: 68.7 % — SIGNIFICANT CHANGE UP (ref 42.2–75.2)
NEUTROPHILS NFR BLD AUTO: 69.3 % — SIGNIFICANT CHANGE UP (ref 42.2–75.2)
NEUTROPHILS NFR BLD AUTO: 70 % — SIGNIFICANT CHANGE UP (ref 42.2–75.2)
NEUTROPHILS NFR BLD AUTO: 70 % — SIGNIFICANT CHANGE UP (ref 42.2–75.2)
NEUTROPHILS NFR BLD AUTO: 70.7 % — SIGNIFICANT CHANGE UP (ref 42.2–75.2)
NEUTROPHILS NFR BLD AUTO: 71.2 % — SIGNIFICANT CHANGE UP (ref 42.2–75.2)
NEUTROPHILS NFR BLD AUTO: 73.7 % — SIGNIFICANT CHANGE UP (ref 42.2–75.2)
NEUTROPHILS NFR BLD AUTO: 77.5 % — HIGH (ref 42.2–75.2)
NEUTROPHILS NFR BLD AUTO: 77.5 % — HIGH (ref 42.2–75.2)
NEUTROPHILS NFR BLD AUTO: 78.8 % — HIGH (ref 42.2–75.2)
NEUTROPHILS NFR BLD AUTO: 81.6 % — HIGH (ref 42.2–75.2)
NEUTROPHILS NFR BLD AUTO: 83.8 % — HIGH (ref 42.2–75.2)
NEUTROPHILS NFR BLD AUTO: 84.5 % — HIGH (ref 42.2–75.2)
NEUTROPHILS NFR BLD AUTO: 85.7 % — HIGH (ref 42.2–75.2)
NEUTROPHILS NFR BLD AUTO: 85.7 % — HIGH (ref 42.2–75.2)
NEUTROPHILS NFR BLD AUTO: 85.9 % — HIGH (ref 42.2–75.2)
NEUTROPHILS NFR BLD AUTO: 91.3 % — HIGH (ref 42.2–75.2)
NITRITE UR QL STRIP: NORMAL
NITRITE UR QL STRIP: NORMAL
NITRITE UR-MCNC: NEGATIVE — SIGNIFICANT CHANGE UP
NITRITE UR-MCNC: NEGATIVE — SIGNIFICANT CHANGE UP
NITRITE UR-MCNC: POSITIVE
NRBC # BLD: 0 /100 WBCS — SIGNIFICANT CHANGE UP (ref 0–0)
NT-PROBNP SERPL-SCNC: 2379 PG/ML — HIGH (ref 0–300)
NT-PROBNP SERPL-SCNC: 592 PG/ML — HIGH (ref 0–300)
ORGANISM # SPEC MICROSCOPIC CNT: SIGNIFICANT CHANGE UP
OSMOLALITY SERPL: 317 MOS/KG — HIGH (ref 280–301)
OSMOLALITY UR: 249 MOS/KG — SIGNIFICANT CHANGE UP (ref 50–1200)
PCO2 BLDA: 28 MMHG — SIGNIFICANT CHANGE UP (ref 25–48)
PCO2 BLDA: 39 MMHG — SIGNIFICANT CHANGE UP (ref 25–48)
PCO2 BLDA: 39 MMHG — SIGNIFICANT CHANGE UP (ref 35–48)
PCO2 BLDA: 40 MMHG — SIGNIFICANT CHANGE UP (ref 25–48)
PCO2 BLDA: 41 MMHG — SIGNIFICANT CHANGE UP (ref 25–48)
PCO2 BLDA: 41 MMHG — SIGNIFICANT CHANGE UP (ref 25–48)
PCO2 BLDA: 42 MMHG — SIGNIFICANT CHANGE UP (ref 25–48)
PCO2 BLDA: 42 MMHG — SIGNIFICANT CHANGE UP (ref 25–48)
PCO2 BLDA: 43 MMHG — SIGNIFICANT CHANGE UP (ref 25–48)
PCO2 BLDA: 44 MMHG — SIGNIFICANT CHANGE UP (ref 25–48)
PCO2 BLDA: 47 MMHG — SIGNIFICANT CHANGE UP (ref 25–48)
PCO2 BLDA: 47 MMHG — SIGNIFICANT CHANGE UP (ref 25–48)
PCO2 BLDA: 50 MMHG — HIGH (ref 25–48)
PCO2 BLDA: 52 MMHG — HIGH (ref 25–48)
PCO2 BLDA: 53 MMHG — HIGH (ref 25–48)
PCO2 BLDV: 54 MMHG — HIGH (ref 39–42)
PCO2 BLDV: 55 MMHG — HIGH (ref 39–42)
PCO2 BLDV: 86 MMHG — HIGH (ref 39–42)
PF4 HEPARIN CMPLX AB SER-ACNC: NEGATIVE — SIGNIFICANT CHANGE UP
PH BLDA: 7.24 — LOW (ref 7.35–7.45)
PH BLDA: 7.28 — LOW (ref 7.35–7.45)
PH BLDA: 7.33 — LOW (ref 7.35–7.45)
PH BLDA: 7.34 — LOW (ref 7.35–7.45)
PH BLDA: 7.35 — SIGNIFICANT CHANGE UP (ref 7.35–7.45)
PH BLDA: 7.4 — SIGNIFICANT CHANGE UP (ref 7.35–7.45)
PH BLDA: 7.4 — SIGNIFICANT CHANGE UP (ref 7.35–7.45)
PH BLDA: 7.41 — SIGNIFICANT CHANGE UP (ref 7.35–7.45)
PH BLDA: 7.43 — SIGNIFICANT CHANGE UP (ref 7.35–7.45)
PH BLDA: 7.44 — SIGNIFICANT CHANGE UP (ref 7.35–7.45)
PH BLDA: 7.45 — SIGNIFICANT CHANGE UP (ref 7.35–7.45)
PH BLDA: 7.46 — HIGH (ref 7.35–7.45)
PH BLDA: 7.46 — HIGH (ref 7.35–7.45)
PH BLDA: 7.47 — HIGH (ref 7.35–7.45)
PH BLDA: 7.49 — HIGH (ref 7.35–7.45)
PH BLDV: 6.87 — LOW (ref 7.32–7.43)
PH BLDV: 7.38 — SIGNIFICANT CHANGE UP (ref 7.32–7.43)
PH BLDV: 7.41 — SIGNIFICANT CHANGE UP (ref 7.32–7.43)
PH UR STRIP: 6
PH UR STRIP: 6.5
PH UR: 5.5 — SIGNIFICANT CHANGE UP (ref 5–8)
PH UR: 6.5 — SIGNIFICANT CHANGE UP (ref 5–8)
PH UR: 7 — SIGNIFICANT CHANGE UP (ref 5–8)
PHOSPHATE SERPL-MCNC: 1.1 MG/DL — LOW (ref 2.1–4.9)
PHOSPHATE SERPL-MCNC: 1.1 MG/DL — LOW (ref 2.1–4.9)
PHOSPHATE SERPL-MCNC: 1.6 MG/DL — LOW (ref 2.1–4.9)
PHOSPHATE SERPL-MCNC: 1.7 MG/DL — LOW (ref 2.1–4.9)
PHOSPHATE SERPL-MCNC: 1.8 MG/DL — LOW (ref 2.1–4.9)
PHOSPHATE SERPL-MCNC: 2.3 MG/DL — SIGNIFICANT CHANGE UP (ref 2.1–4.9)
PHOSPHATE SERPL-MCNC: 2.6 MG/DL — SIGNIFICANT CHANGE UP (ref 2.1–4.9)
PHOSPHATE SERPL-MCNC: 2.6 MG/DL — SIGNIFICANT CHANGE UP (ref 2.1–4.9)
PHOSPHATE SERPL-MCNC: 3 MG/DL — SIGNIFICANT CHANGE UP (ref 2.1–4.9)
PHOSPHATE SERPL-MCNC: 3.6 MG/DL — SIGNIFICANT CHANGE UP (ref 2.1–4.9)
PHOSPHATE SERPL-MCNC: 4 MG/DL — SIGNIFICANT CHANGE UP (ref 2.1–4.9)
PHOSPHATE SERPL-MCNC: 4.6 MG/DL — SIGNIFICANT CHANGE UP (ref 2.1–4.9)
PLAT MORPH BLD: ABNORMAL
PLATELET # BLD AUTO: 152 K/UL — SIGNIFICANT CHANGE UP (ref 130–400)
PLATELET # BLD AUTO: 169 K/UL — SIGNIFICANT CHANGE UP (ref 130–400)
PLATELET # BLD AUTO: 171 K/UL — SIGNIFICANT CHANGE UP (ref 130–400)
PLATELET # BLD AUTO: 172 K/UL — SIGNIFICANT CHANGE UP (ref 130–400)
PLATELET # BLD AUTO: 179 K/UL — SIGNIFICANT CHANGE UP (ref 130–400)
PLATELET # BLD AUTO: 187 K/UL — SIGNIFICANT CHANGE UP (ref 130–400)
PLATELET # BLD AUTO: 193 K/UL — SIGNIFICANT CHANGE UP (ref 130–400)
PLATELET # BLD AUTO: 194 K/UL — SIGNIFICANT CHANGE UP (ref 130–400)
PLATELET # BLD AUTO: 195 K/UL — SIGNIFICANT CHANGE UP (ref 130–400)
PLATELET # BLD AUTO: 204 K/UL — SIGNIFICANT CHANGE UP (ref 130–400)
PLATELET # BLD AUTO: 217 K/UL — SIGNIFICANT CHANGE UP (ref 130–400)
PLATELET # BLD AUTO: 227 K/UL — SIGNIFICANT CHANGE UP (ref 130–400)
PLATELET # BLD AUTO: 228 K/UL — SIGNIFICANT CHANGE UP (ref 130–400)
PLATELET # BLD AUTO: 229 K/UL — SIGNIFICANT CHANGE UP (ref 130–400)
PLATELET # BLD AUTO: 235 K/UL — SIGNIFICANT CHANGE UP (ref 130–400)
PLATELET # BLD AUTO: 237 K/UL — SIGNIFICANT CHANGE UP (ref 130–400)
PLATELET # BLD AUTO: 238 K/UL — SIGNIFICANT CHANGE UP (ref 130–400)
PLATELET # BLD AUTO: 241 K/UL — SIGNIFICANT CHANGE UP (ref 130–400)
PLATELET # BLD AUTO: 242 K/UL — SIGNIFICANT CHANGE UP (ref 130–400)
PLATELET # BLD AUTO: 245 K/UL — SIGNIFICANT CHANGE UP (ref 130–400)
PLATELET # BLD AUTO: 250 K/UL — SIGNIFICANT CHANGE UP (ref 130–400)
PLATELET # BLD AUTO: 253 K/UL — SIGNIFICANT CHANGE UP (ref 130–400)
PLATELET # BLD AUTO: 263 K/UL — SIGNIFICANT CHANGE UP (ref 130–400)
PLATELET # BLD AUTO: 265 K/UL — SIGNIFICANT CHANGE UP (ref 130–400)
PLATELET # BLD AUTO: 270 K/UL — SIGNIFICANT CHANGE UP (ref 130–400)
PLATELET # BLD AUTO: 272 K/UL — SIGNIFICANT CHANGE UP (ref 130–400)
PLATELET # BLD AUTO: 285 K/UL — SIGNIFICANT CHANGE UP (ref 130–400)
PLATELET # BLD AUTO: 291 K/UL — SIGNIFICANT CHANGE UP (ref 130–400)
PLATELET # BLD AUTO: 294 K/UL — SIGNIFICANT CHANGE UP (ref 130–400)
PLATELET # BLD AUTO: 298 K/UL — SIGNIFICANT CHANGE UP (ref 130–400)
PLATELET # BLD AUTO: 299 K/UL — SIGNIFICANT CHANGE UP (ref 130–400)
PO2 BLDA: 117 MMHG — HIGH (ref 83–108)
PO2 BLDA: 126 MMHG — HIGH (ref 83–108)
PO2 BLDA: 133 MMHG — HIGH (ref 83–108)
PO2 BLDA: 164 MMHG — HIGH (ref 83–108)
PO2 BLDA: 195 MMHG — HIGH (ref 83–108)
PO2 BLDA: 56 MMHG — LOW (ref 83–108)
PO2 BLDA: 69 MMHG — LOW (ref 83–108)
PO2 BLDA: 73 MMHG — LOW (ref 83–108)
PO2 BLDA: 75 MMHG — LOW (ref 83–108)
PO2 BLDA: 76 MMHG — LOW (ref 83–108)
PO2 BLDA: 78 MMHG — LOW (ref 83–108)
PO2 BLDA: 81 MMHG — LOW (ref 83–108)
PO2 BLDA: 84 MMHG — SIGNIFICANT CHANGE UP (ref 83–108)
PO2 BLDA: 86 MMHG — SIGNIFICANT CHANGE UP (ref 83–108)
PO2 BLDA: 88 MMHG — SIGNIFICANT CHANGE UP (ref 83–108)
PO2 BLDA: 89 MMHG — SIGNIFICANT CHANGE UP (ref 83–108)
PO2 BLDA: 99 MMHG — SIGNIFICANT CHANGE UP (ref 83–108)
PO2 BLDV: 18 MMHG — SIGNIFICANT CHANGE UP
PO2 BLDV: 20 MMHG — SIGNIFICANT CHANGE UP
PO2 BLDV: 22 MMHG — SIGNIFICANT CHANGE UP
POLYCHROMASIA BLD QL SMEAR: SLIGHT — SIGNIFICANT CHANGE UP
POTASSIUM BLDV-SCNC: 3.7 MMOL/L — SIGNIFICANT CHANGE UP (ref 3.5–5.1)
POTASSIUM BLDV-SCNC: 4.1 MMOL/L — SIGNIFICANT CHANGE UP (ref 3.5–5.1)
POTASSIUM BLDV-SCNC: 5.6 MMOL/L — HIGH (ref 3.5–5.1)
POTASSIUM SERPL-MCNC: 3.4 MMOL/L — LOW (ref 3.5–5)
POTASSIUM SERPL-MCNC: 3.4 MMOL/L — LOW (ref 3.5–5)
POTASSIUM SERPL-MCNC: 3.5 MMOL/L — SIGNIFICANT CHANGE UP (ref 3.5–5)
POTASSIUM SERPL-MCNC: 3.7 MMOL/L — SIGNIFICANT CHANGE UP (ref 3.5–5)
POTASSIUM SERPL-MCNC: 3.8 MMOL/L — SIGNIFICANT CHANGE UP (ref 3.5–5)
POTASSIUM SERPL-MCNC: 3.8 MMOL/L — SIGNIFICANT CHANGE UP (ref 3.5–5)
POTASSIUM SERPL-MCNC: 3.9 MMOL/L — SIGNIFICANT CHANGE UP (ref 3.5–5)
POTASSIUM SERPL-MCNC: 3.9 MMOL/L — SIGNIFICANT CHANGE UP (ref 3.5–5)
POTASSIUM SERPL-MCNC: 4 MMOL/L — SIGNIFICANT CHANGE UP (ref 3.5–5)
POTASSIUM SERPL-MCNC: 4 MMOL/L — SIGNIFICANT CHANGE UP (ref 3.5–5)
POTASSIUM SERPL-MCNC: 4.1 MMOL/L — SIGNIFICANT CHANGE UP (ref 3.5–5)
POTASSIUM SERPL-MCNC: 4.2 MMOL/L — SIGNIFICANT CHANGE UP (ref 3.5–5)
POTASSIUM SERPL-MCNC: 4.3 MMOL/L — SIGNIFICANT CHANGE UP (ref 3.5–5)
POTASSIUM SERPL-MCNC: 4.6 MMOL/L — SIGNIFICANT CHANGE UP (ref 3.5–5)
POTASSIUM SERPL-MCNC: 4.6 MMOL/L — SIGNIFICANT CHANGE UP (ref 3.5–5)
POTASSIUM SERPL-MCNC: 4.7 MMOL/L — SIGNIFICANT CHANGE UP (ref 3.5–5)
POTASSIUM SERPL-MCNC: 4.7 MMOL/L — SIGNIFICANT CHANGE UP (ref 3.5–5)
POTASSIUM SERPL-MCNC: 4.8 MMOL/L — SIGNIFICANT CHANGE UP (ref 3.5–5)
POTASSIUM SERPL-MCNC: 4.8 MMOL/L — SIGNIFICANT CHANGE UP (ref 3.5–5)
POTASSIUM SERPL-MCNC: 4.9 MMOL/L — SIGNIFICANT CHANGE UP (ref 3.5–5)
POTASSIUM SERPL-MCNC: 4.9 MMOL/L — SIGNIFICANT CHANGE UP (ref 3.5–5)
POTASSIUM SERPL-MCNC: 5 MMOL/L — SIGNIFICANT CHANGE UP (ref 3.5–5)
POTASSIUM SERPL-MCNC: 5.5 MMOL/L — HIGH (ref 3.5–5)
POTASSIUM SERPL-SCNC: 3.4 MMOL/L — LOW (ref 3.5–5)
POTASSIUM SERPL-SCNC: 3.4 MMOL/L — LOW (ref 3.5–5)
POTASSIUM SERPL-SCNC: 3.5 MMOL/L — SIGNIFICANT CHANGE UP (ref 3.5–5)
POTASSIUM SERPL-SCNC: 3.7 MMOL/L — SIGNIFICANT CHANGE UP (ref 3.5–5)
POTASSIUM SERPL-SCNC: 3.8 MMOL/L — SIGNIFICANT CHANGE UP (ref 3.5–5)
POTASSIUM SERPL-SCNC: 3.8 MMOL/L — SIGNIFICANT CHANGE UP (ref 3.5–5)
POTASSIUM SERPL-SCNC: 3.9 MMOL/L — SIGNIFICANT CHANGE UP (ref 3.5–5)
POTASSIUM SERPL-SCNC: 3.9 MMOL/L — SIGNIFICANT CHANGE UP (ref 3.5–5)
POTASSIUM SERPL-SCNC: 4 MMOL/L — SIGNIFICANT CHANGE UP (ref 3.5–5)
POTASSIUM SERPL-SCNC: 4 MMOL/L — SIGNIFICANT CHANGE UP (ref 3.5–5)
POTASSIUM SERPL-SCNC: 4.1 MMOL/L — SIGNIFICANT CHANGE UP (ref 3.5–5)
POTASSIUM SERPL-SCNC: 4.2 MMOL/L — SIGNIFICANT CHANGE UP (ref 3.5–5)
POTASSIUM SERPL-SCNC: 4.3 MMOL/L — SIGNIFICANT CHANGE UP (ref 3.5–5)
POTASSIUM SERPL-SCNC: 4.6 MMOL/L — SIGNIFICANT CHANGE UP (ref 3.5–5)
POTASSIUM SERPL-SCNC: 4.6 MMOL/L — SIGNIFICANT CHANGE UP (ref 3.5–5)
POTASSIUM SERPL-SCNC: 4.7 MMOL/L — SIGNIFICANT CHANGE UP (ref 3.5–5)
POTASSIUM SERPL-SCNC: 4.7 MMOL/L — SIGNIFICANT CHANGE UP (ref 3.5–5)
POTASSIUM SERPL-SCNC: 4.8 MMOL/L — SIGNIFICANT CHANGE UP (ref 3.5–5)
POTASSIUM SERPL-SCNC: 4.8 MMOL/L — SIGNIFICANT CHANGE UP (ref 3.5–5)
POTASSIUM SERPL-SCNC: 4.9 MMOL/L — SIGNIFICANT CHANGE UP (ref 3.5–5)
POTASSIUM SERPL-SCNC: 4.9 MMOL/L — SIGNIFICANT CHANGE UP (ref 3.5–5)
POTASSIUM SERPL-SCNC: 5 MMOL/L — SIGNIFICANT CHANGE UP (ref 3.5–5)
POTASSIUM SERPL-SCNC: 5.5 MMOL/L — HIGH (ref 3.5–5)
PROCALCITONIN SERPL-MCNC: 2.8 NG/ML — HIGH (ref 0.02–0.1)
PROCALCITONIN SERPL-MCNC: 3.53 NG/ML — HIGH (ref 0.02–0.1)
PROCALCITONIN SERPL-MCNC: 3.85 NG/ML — HIGH (ref 0.02–0.1)
PROCALCITONIN SERPL-MCNC: 4.67 NG/ML — HIGH (ref 0.02–0.1)
PROT ?TM UR-MCNC: 10 MG/DLG/24H — SIGNIFICANT CHANGE UP
PROT SERPL-MCNC: 5 G/DL — LOW (ref 6–8)
PROT SERPL-MCNC: 5.2 G/DL — LOW (ref 6–8)
PROT SERPL-MCNC: 5.2 G/DL — LOW (ref 6–8)
PROT SERPL-MCNC: 5.5 G/DL — LOW (ref 6–8)
PROT SERPL-MCNC: 5.5 G/DL — LOW (ref 6–8)
PROT SERPL-MCNC: 5.7 G/DL — LOW (ref 6–8)
PROT SERPL-MCNC: 6 G/DL — SIGNIFICANT CHANGE UP (ref 6–8)
PROT SERPL-MCNC: 6.1 G/DL — SIGNIFICANT CHANGE UP (ref 6–8)
PROT SERPL-MCNC: 6.2 G/DL — SIGNIFICANT CHANGE UP (ref 6–8)
PROT SERPL-MCNC: 6.2 G/DL — SIGNIFICANT CHANGE UP (ref 6–8)
PROT SERPL-MCNC: 6.5 G/DL — SIGNIFICANT CHANGE UP (ref 6–8)
PROT SERPL-MCNC: 6.7 G/DL — SIGNIFICANT CHANGE UP (ref 6–8)
PROT SERPL-MCNC: 6.7 G/DL — SIGNIFICANT CHANGE UP (ref 6–8)
PROT SERPL-MCNC: 7 G/DL — SIGNIFICANT CHANGE UP (ref 6–8)
PROT SERPL-MCNC: 7.2 G/DL — SIGNIFICANT CHANGE UP (ref 6–8)
PROT SERPL-MCNC: 7.2 G/DL — SIGNIFICANT CHANGE UP (ref 6–8)
PROT SERPL-MCNC: 7.7 G/DL — SIGNIFICANT CHANGE UP (ref 6–8)
PROT SERPL-MCNC: 8 G/DL — SIGNIFICANT CHANGE UP (ref 6–8)
PROT SERPL-MCNC: 8.1 G/DL — HIGH (ref 6–8)
PROT SERPL-MCNC: 8.2 G/DL — HIGH (ref 6–8)
PROT UR STRIP-MCNC: NORMAL
PROT UR STRIP-MCNC: NORMAL
PROT UR-MCNC: 30 MG/DL
PROT UR-MCNC: 30 MG/DL
PROT UR-MCNC: NEGATIVE MG/DL — SIGNIFICANT CHANGE UP
PROT UR-MCNC: NEGATIVE MG/DL — SIGNIFICANT CHANGE UP
PROT UR-MCNC: SIGNIFICANT CHANGE UP
PROTHROM AB SERPL-ACNC: 14 SEC — HIGH (ref 9.95–12.87)
PROTHROM AB SERPL-ACNC: 15.3 SEC — HIGH (ref 9.95–12.87)
PROTHROM AB SERPL-ACNC: 15.4 SEC — HIGH (ref 9.95–12.87)
PROTHROM AB SERPL-ACNC: 16.8 SEC — HIGH (ref 9.95–12.87)
PROTHROM AB SERPL-ACNC: 17.6 SEC — HIGH (ref 9.95–12.87)
PROTHROM AB SERPL-ACNC: 18 SEC — HIGH (ref 9.95–12.87)
PROTHROM AB SERPL-ACNC: 19.4 SEC — HIGH (ref 9.95–12.87)
RAPID RVP RESULT: SIGNIFICANT CHANGE UP
RAPID RVP RESULT: SIGNIFICANT CHANGE UP
RBC # BLD: 2.45 M/UL — LOW (ref 4.2–5.4)
RBC # BLD: 2.86 M/UL — LOW (ref 4.2–5.4)
RBC # BLD: 3.06 M/UL — LOW (ref 4.2–5.4)
RBC # BLD: 3.09 M/UL — LOW (ref 4.2–5.4)
RBC # BLD: 3.13 M/UL — LOW (ref 4.2–5.4)
RBC # BLD: 3.14 M/UL — LOW (ref 4.2–5.4)
RBC # BLD: 3.15 M/UL — LOW (ref 4.2–5.4)
RBC # BLD: 3.18 M/UL — LOW (ref 4.2–5.4)
RBC # BLD: 3.24 M/UL — LOW (ref 4.2–5.4)
RBC # BLD: 3.25 M/UL — LOW (ref 4.2–5.4)
RBC # BLD: 3.25 M/UL — LOW (ref 4.2–5.4)
RBC # BLD: 3.26 M/UL — LOW (ref 4.2–5.4)
RBC # BLD: 3.27 M/UL — LOW (ref 4.2–5.4)
RBC # BLD: 3.32 M/UL — LOW (ref 4.2–5.4)
RBC # BLD: 3.34 M/UL — LOW (ref 4.2–5.4)
RBC # BLD: 3.34 M/UL — LOW (ref 4.2–5.4)
RBC # BLD: 3.36 M/UL — LOW (ref 4.2–5.4)
RBC # BLD: 3.37 M/UL — LOW (ref 4.2–5.4)
RBC # BLD: 3.38 M/UL — LOW (ref 4.2–5.4)
RBC # BLD: 3.4 M/UL — LOW (ref 4.2–5.4)
RBC # BLD: 3.43 M/UL — LOW (ref 4.2–5.4)
RBC # BLD: 3.46 M/UL — LOW (ref 4.2–5.4)
RBC # BLD: 3.5 M/UL — LOW (ref 4.2–5.4)
RBC # BLD: 3.57 M/UL — LOW (ref 4.2–5.4)
RBC # BLD: 3.59 M/UL — LOW (ref 4.2–5.4)
RBC # BLD: 3.63 M/UL — LOW (ref 4.2–5.4)
RBC # BLD: 3.67 M/UL — LOW (ref 4.2–5.4)
RBC # BLD: 3.77 M/UL — LOW (ref 4.2–5.4)
RBC # BLD: 3.78 M/UL — LOW (ref 4.2–5.4)
RBC # BLD: 3.79 M/UL — LOW (ref 4.2–5.4)
RBC # BLD: 3.86 M/UL — LOW (ref 4.2–5.4)
RBC # BLD: 3.88 M/UL — LOW (ref 4.2–5.4)
RBC # BLD: 3.91 M/UL — LOW (ref 4.2–5.4)
RBC # FLD: 13.5 % — SIGNIFICANT CHANGE UP (ref 11.5–14.5)
RBC # FLD: 13.6 % — SIGNIFICANT CHANGE UP (ref 11.5–14.5)
RBC # FLD: 13.6 % — SIGNIFICANT CHANGE UP (ref 11.5–14.5)
RBC # FLD: 13.8 % — SIGNIFICANT CHANGE UP (ref 11.5–14.5)
RBC # FLD: 14.3 % — SIGNIFICANT CHANGE UP (ref 11.5–14.5)
RBC # FLD: 14.4 % — SIGNIFICANT CHANGE UP (ref 11.5–14.5)
RBC # FLD: 14.6 % — HIGH (ref 11.5–14.5)
RBC # FLD: 14.7 % — HIGH (ref 11.5–14.5)
RBC # FLD: 14.8 % — HIGH (ref 11.5–14.5)
RBC # FLD: 14.8 % — HIGH (ref 11.5–14.5)
RBC # FLD: 14.9 % — HIGH (ref 11.5–14.5)
RBC # FLD: 15 % — HIGH (ref 11.5–14.5)
RBC # FLD: 15.1 % — HIGH (ref 11.5–14.5)
RBC # FLD: 15.2 % — HIGH (ref 11.5–14.5)
RBC # FLD: 15.2 % — HIGH (ref 11.5–14.5)
RBC # FLD: 15.3 % — HIGH (ref 11.5–14.5)
RBC # FLD: 15.3 % — HIGH (ref 11.5–14.5)
RBC # FLD: 15.4 % — HIGH (ref 11.5–14.5)
RBC # FLD: 15.5 % — HIGH (ref 11.5–14.5)
RBC # FLD: 15.6 % — HIGH (ref 11.5–14.5)
RBC # FLD: 15.6 % — HIGH (ref 11.5–14.5)
RBC # FLD: 15.7 % — HIGH (ref 11.5–14.5)
RBC BLD AUTO: NORMAL — SIGNIFICANT CHANGE UP
RBC CASTS # UR COMP ASSIST: 1 /HPF — SIGNIFICANT CHANGE UP (ref 0–4)
RBC CASTS # UR COMP ASSIST: ABNORMAL /HPF
RBC CASTS # UR COMP ASSIST: SIGNIFICANT CHANGE UP /HPF
RSV RNA NPH QL NAA+NON-PROBE: SIGNIFICANT CHANGE UP
RSV RNA NPH QL NAA+NON-PROBE: SIGNIFICANT CHANGE UP
S PNEUM AG UR QL: NEGATIVE — SIGNIFICANT CHANGE UP
SAO2 % BLDA: 85.9 % — LOW (ref 94–98)
SAO2 % BLDA: 95.6 % — SIGNIFICANT CHANGE UP (ref 94–98)
SAO2 % BLDA: 96.1 % — SIGNIFICANT CHANGE UP (ref 94–98)
SAO2 % BLDA: 96.5 % — SIGNIFICANT CHANGE UP (ref 94–98)
SAO2 % BLDA: 96.8 % — SIGNIFICANT CHANGE UP (ref 94–98)
SAO2 % BLDA: 97.2 % — SIGNIFICANT CHANGE UP (ref 94–98)
SAO2 % BLDA: 97.4 % — SIGNIFICANT CHANGE UP (ref 94–98)
SAO2 % BLDA: 98 % — SIGNIFICANT CHANGE UP (ref 94–98)
SAO2 % BLDA: 98.2 % — HIGH (ref 94–98)
SAO2 % BLDA: 98.3 % — HIGH (ref 94–98)
SAO2 % BLDA: 98.4 % — HIGH (ref 94–98)
SAO2 % BLDA: 98.4 % — HIGH (ref 94–98)
SAO2 % BLDA: 98.8 % — HIGH (ref 94–98)
SAO2 % BLDA: 98.9 % — HIGH (ref 94–98)
SAO2 % BLDA: 99.1 % — HIGH (ref 94–98)
SAO2 % BLDA: 99.4 % — HIGH (ref 94–98)
SAO2 % BLDA: 99.7 % — HIGH (ref 94–98)
SAO2 % BLDV: 10.9 % — SIGNIFICANT CHANGE UP
SAO2 % BLDV: 24 % — SIGNIFICANT CHANGE UP
SAO2 % BLDV: 27.1 % — SIGNIFICANT CHANGE UP
SARS-COV-2 RNA SPEC QL NAA+PROBE: SIGNIFICANT CHANGE UP
SMUDGE CELLS # BLD: PRESENT — SIGNIFICANT CHANGE UP
SODIUM SERPL-SCNC: 129 MMOL/L — LOW (ref 135–146)
SODIUM SERPL-SCNC: 134 MMOL/L — LOW (ref 135–146)
SODIUM SERPL-SCNC: 135 MMOL/L — SIGNIFICANT CHANGE UP (ref 135–146)
SODIUM SERPL-SCNC: 137 MMOL/L — SIGNIFICANT CHANGE UP (ref 135–146)
SODIUM SERPL-SCNC: 137 MMOL/L — SIGNIFICANT CHANGE UP (ref 135–146)
SODIUM SERPL-SCNC: 138 MMOL/L — SIGNIFICANT CHANGE UP (ref 135–146)
SODIUM SERPL-SCNC: 139 MMOL/L — SIGNIFICANT CHANGE UP (ref 135–146)
SODIUM SERPL-SCNC: 139 MMOL/L — SIGNIFICANT CHANGE UP (ref 135–146)
SODIUM SERPL-SCNC: 140 MMOL/L — SIGNIFICANT CHANGE UP (ref 135–146)
SODIUM SERPL-SCNC: 141 MMOL/L — SIGNIFICANT CHANGE UP (ref 135–146)
SODIUM SERPL-SCNC: 142 MMOL/L — SIGNIFICANT CHANGE UP (ref 135–146)
SODIUM SERPL-SCNC: 143 MMOL/L — SIGNIFICANT CHANGE UP (ref 135–146)
SODIUM SERPL-SCNC: 143 MMOL/L — SIGNIFICANT CHANGE UP (ref 135–146)
SODIUM SERPL-SCNC: 144 MMOL/L — SIGNIFICANT CHANGE UP (ref 135–146)
SODIUM SERPL-SCNC: 144 MMOL/L — SIGNIFICANT CHANGE UP (ref 135–146)
SODIUM UR-SCNC: 61 MMOL/L — SIGNIFICANT CHANGE UP
SP GR SPEC: 1.01 — SIGNIFICANT CHANGE UP (ref 1.01–1.03)
SP GR SPEC: 1.01 — SIGNIFICANT CHANGE UP (ref 1.01–1.03)
SP GR SPEC: 1.02 — SIGNIFICANT CHANGE UP (ref 1.01–1.03)
SP GR UR STRIP: 1.01
SP GR UR STRIP: 1.01
SPECIMEN SOURCE: SIGNIFICANT CHANGE UP
TRI-PHOS CRY UR QL COMP ASSIST: NEGATIVE — SIGNIFICANT CHANGE UP
TROPONIN T SERPL-MCNC: <0.01 NG/ML — SIGNIFICANT CHANGE UP
TSH SERPL-MCNC: 0.73 UIU/ML — SIGNIFICANT CHANGE UP (ref 0.27–4.2)
URATE CRY FLD QL MICRO: NEGATIVE — SIGNIFICANT CHANGE UP
URATE SERPL-MCNC: 8.9 MG/DL — HIGH (ref 2.5–7)
UROBILINOGEN FLD QL: 0.2 MG/DL — SIGNIFICANT CHANGE UP
UROBILINOGEN FLD QL: SIGNIFICANT CHANGE UP
WBC # BLD: 10.46 K/UL — SIGNIFICANT CHANGE UP (ref 4.8–10.8)
WBC # BLD: 10.55 K/UL — SIGNIFICANT CHANGE UP (ref 4.8–10.8)
WBC # BLD: 10.6 K/UL — SIGNIFICANT CHANGE UP (ref 4.8–10.8)
WBC # BLD: 10.66 K/UL — SIGNIFICANT CHANGE UP (ref 4.8–10.8)
WBC # BLD: 11.35 K/UL — HIGH (ref 4.8–10.8)
WBC # BLD: 14.7 K/UL — HIGH (ref 4.8–10.8)
WBC # BLD: 15.15 K/UL — HIGH (ref 4.8–10.8)
WBC # BLD: 15.86 K/UL — HIGH (ref 4.8–10.8)
WBC # BLD: 16.06 K/UL — HIGH (ref 4.8–10.8)
WBC # BLD: 3.81 K/UL — LOW (ref 4.8–10.8)
WBC # BLD: 3.99 K/UL — LOW (ref 4.8–10.8)
WBC # BLD: 4.32 K/UL — LOW (ref 4.8–10.8)
WBC # BLD: 4.55 K/UL — LOW (ref 4.8–10.8)
WBC # BLD: 5.08 K/UL — SIGNIFICANT CHANGE UP (ref 4.8–10.8)
WBC # BLD: 5.32 K/UL — SIGNIFICANT CHANGE UP (ref 4.8–10.8)
WBC # BLD: 5.35 K/UL — SIGNIFICANT CHANGE UP (ref 4.8–10.8)
WBC # BLD: 5.39 K/UL — SIGNIFICANT CHANGE UP (ref 4.8–10.8)
WBC # BLD: 5.45 K/UL — SIGNIFICANT CHANGE UP (ref 4.8–10.8)
WBC # BLD: 5.48 K/UL — SIGNIFICANT CHANGE UP (ref 4.8–10.8)
WBC # BLD: 5.52 K/UL — SIGNIFICANT CHANGE UP (ref 4.8–10.8)
WBC # BLD: 6.06 K/UL — SIGNIFICANT CHANGE UP (ref 4.8–10.8)
WBC # BLD: 6.32 K/UL — SIGNIFICANT CHANGE UP (ref 4.8–10.8)
WBC # BLD: 6.33 K/UL — SIGNIFICANT CHANGE UP (ref 4.8–10.8)
WBC # BLD: 6.47 K/UL — SIGNIFICANT CHANGE UP (ref 4.8–10.8)
WBC # BLD: 7.21 K/UL — SIGNIFICANT CHANGE UP (ref 4.8–10.8)
WBC # BLD: 7.36 K/UL — SIGNIFICANT CHANGE UP (ref 4.8–10.8)
WBC # BLD: 7.37 K/UL — SIGNIFICANT CHANGE UP (ref 4.8–10.8)
WBC # BLD: 7.48 K/UL — SIGNIFICANT CHANGE UP (ref 4.8–10.8)
WBC # BLD: 7.83 K/UL — SIGNIFICANT CHANGE UP (ref 4.8–10.8)
WBC # BLD: 8.08 K/UL — SIGNIFICANT CHANGE UP (ref 4.8–10.8)
WBC # BLD: 8.76 K/UL — SIGNIFICANT CHANGE UP (ref 4.8–10.8)
WBC # BLD: 8.76 K/UL — SIGNIFICANT CHANGE UP (ref 4.8–10.8)
WBC # BLD: 9.85 K/UL — SIGNIFICANT CHANGE UP (ref 4.8–10.8)
WBC # FLD AUTO: 10.46 K/UL — SIGNIFICANT CHANGE UP (ref 4.8–10.8)
WBC # FLD AUTO: 10.55 K/UL — SIGNIFICANT CHANGE UP (ref 4.8–10.8)
WBC # FLD AUTO: 10.6 K/UL — SIGNIFICANT CHANGE UP (ref 4.8–10.8)
WBC # FLD AUTO: 10.66 K/UL — SIGNIFICANT CHANGE UP (ref 4.8–10.8)
WBC # FLD AUTO: 11.35 K/UL — HIGH (ref 4.8–10.8)
WBC # FLD AUTO: 14.7 K/UL — HIGH (ref 4.8–10.8)
WBC # FLD AUTO: 15.15 K/UL — HIGH (ref 4.8–10.8)
WBC # FLD AUTO: 15.86 K/UL — HIGH (ref 4.8–10.8)
WBC # FLD AUTO: 16.06 K/UL — HIGH (ref 4.8–10.8)
WBC # FLD AUTO: 3.81 K/UL — LOW (ref 4.8–10.8)
WBC # FLD AUTO: 3.99 K/UL — LOW (ref 4.8–10.8)
WBC # FLD AUTO: 4.32 K/UL — LOW (ref 4.8–10.8)
WBC # FLD AUTO: 4.55 K/UL — LOW (ref 4.8–10.8)
WBC # FLD AUTO: 5.08 K/UL — SIGNIFICANT CHANGE UP (ref 4.8–10.8)
WBC # FLD AUTO: 5.32 K/UL — SIGNIFICANT CHANGE UP (ref 4.8–10.8)
WBC # FLD AUTO: 5.35 K/UL — SIGNIFICANT CHANGE UP (ref 4.8–10.8)
WBC # FLD AUTO: 5.39 K/UL — SIGNIFICANT CHANGE UP (ref 4.8–10.8)
WBC # FLD AUTO: 5.45 K/UL — SIGNIFICANT CHANGE UP (ref 4.8–10.8)
WBC # FLD AUTO: 5.48 K/UL — SIGNIFICANT CHANGE UP (ref 4.8–10.8)
WBC # FLD AUTO: 5.52 K/UL — SIGNIFICANT CHANGE UP (ref 4.8–10.8)
WBC # FLD AUTO: 6.06 K/UL — SIGNIFICANT CHANGE UP (ref 4.8–10.8)
WBC # FLD AUTO: 6.32 K/UL — SIGNIFICANT CHANGE UP (ref 4.8–10.8)
WBC # FLD AUTO: 6.33 K/UL — SIGNIFICANT CHANGE UP (ref 4.8–10.8)
WBC # FLD AUTO: 6.47 K/UL — SIGNIFICANT CHANGE UP (ref 4.8–10.8)
WBC # FLD AUTO: 7.21 K/UL — SIGNIFICANT CHANGE UP (ref 4.8–10.8)
WBC # FLD AUTO: 7.36 K/UL — SIGNIFICANT CHANGE UP (ref 4.8–10.8)
WBC # FLD AUTO: 7.37 K/UL — SIGNIFICANT CHANGE UP (ref 4.8–10.8)
WBC # FLD AUTO: 7.48 K/UL — SIGNIFICANT CHANGE UP (ref 4.8–10.8)
WBC # FLD AUTO: 7.83 K/UL — SIGNIFICANT CHANGE UP (ref 4.8–10.8)
WBC # FLD AUTO: 8.08 K/UL — SIGNIFICANT CHANGE UP (ref 4.8–10.8)
WBC # FLD AUTO: 8.76 K/UL — SIGNIFICANT CHANGE UP (ref 4.8–10.8)
WBC # FLD AUTO: 8.76 K/UL — SIGNIFICANT CHANGE UP (ref 4.8–10.8)
WBC # FLD AUTO: 9.85 K/UL — SIGNIFICANT CHANGE UP (ref 4.8–10.8)
WBC UR QL: 8 /HPF — HIGH (ref 0–5)
WBC UR QL: ABNORMAL /HPF
WBC UR QL: SIGNIFICANT CHANGE UP /HPF

## 2022-01-01 PROCEDURE — 71045 X-RAY EXAM CHEST 1 VIEW: CPT | Mod: 26

## 2022-01-01 PROCEDURE — 93010 ELECTROCARDIOGRAM REPORT: CPT

## 2022-01-01 PROCEDURE — 71045 X-RAY EXAM CHEST 1 VIEW: CPT | Mod: 26,77

## 2022-01-01 PROCEDURE — 99214 OFFICE O/P EST MOD 30 MIN: CPT

## 2022-01-01 PROCEDURE — 73610 X-RAY EXAM OF ANKLE: CPT | Mod: 26,50

## 2022-01-01 PROCEDURE — 73130 X-RAY EXAM OF HAND: CPT | Mod: 26,LT

## 2022-01-01 PROCEDURE — 93970 EXTREMITY STUDY: CPT | Mod: 26

## 2022-01-01 PROCEDURE — 99232 SBSQ HOSP IP/OBS MODERATE 35: CPT

## 2022-01-01 PROCEDURE — 99222 1ST HOSP IP/OBS MODERATE 55: CPT

## 2022-01-01 PROCEDURE — 99223 1ST HOSP IP/OBS HIGH 75: CPT

## 2022-01-01 PROCEDURE — 99231 SBSQ HOSP IP/OBS SF/LOW 25: CPT

## 2022-01-01 PROCEDURE — 99204 OFFICE O/P NEW MOD 45 MIN: CPT

## 2022-01-01 PROCEDURE — 99239 HOSP IP/OBS DSCHRG MGMT >30: CPT

## 2022-01-01 PROCEDURE — 36569 INSJ PICC 5 YR+ W/O IMAGING: CPT

## 2022-01-01 PROCEDURE — 99291 CRITICAL CARE FIRST HOUR: CPT | Mod: CS

## 2022-01-01 PROCEDURE — 99284 EMERGENCY DEPT VISIT MOD MDM: CPT | Mod: GC

## 2022-01-01 PROCEDURE — 93880 EXTRACRANIAL BILAT STUDY: CPT

## 2022-01-01 PROCEDURE — 71250 CT THORAX DX C-: CPT | Mod: 26

## 2022-01-01 PROCEDURE — 99285 EMERGENCY DEPT VISIT HI MDM: CPT | Mod: FS,CS

## 2022-01-01 PROCEDURE — 76937 US GUIDE VASCULAR ACCESS: CPT | Mod: 26,59

## 2022-01-01 PROCEDURE — 74176 CT ABD & PELVIS W/O CONTRAST: CPT | Mod: 26,MA

## 2022-01-01 PROCEDURE — 99291 CRITICAL CARE FIRST HOUR: CPT

## 2022-01-01 PROCEDURE — 99233 SBSQ HOSP IP/OBS HIGH 50: CPT

## 2022-01-01 PROCEDURE — 95822 EEG COMA OR SLEEP ONLY: CPT | Mod: 26

## 2022-01-01 PROCEDURE — 99214 OFFICE O/P EST MOD 30 MIN: CPT | Mod: CS

## 2022-01-01 PROCEDURE — 31636 BRONCHOSCOPY BRONCH STENTS: CPT

## 2022-01-01 PROCEDURE — 71250 CT THORAX DX C-: CPT | Mod: 26,MA

## 2022-01-01 PROCEDURE — 99291 CRITICAL CARE FIRST HOUR: CPT | Mod: 25

## 2022-01-01 PROCEDURE — 72170 X-RAY EXAM OF PELVIS: CPT | Mod: 26

## 2022-01-01 PROCEDURE — 99213 OFFICE O/P EST LOW 20 MIN: CPT

## 2022-01-01 PROCEDURE — 99281 EMR DPT VST MAYX REQ PHY/QHP: CPT

## 2022-01-01 PROCEDURE — 99285 EMERGENCY DEPT VISIT HI MDM: CPT | Mod: FS

## 2022-01-01 PROCEDURE — 73030 X-RAY EXAM OF SHOULDER: CPT | Mod: 26,50

## 2022-01-01 PROCEDURE — 93306 TTE W/DOPPLER COMPLETE: CPT | Mod: 26

## 2022-01-01 PROCEDURE — 71045 X-RAY EXAM CHEST 1 VIEW: CPT | Mod: 26,76

## 2022-01-01 PROCEDURE — 71045 X-RAY EXAM CHEST 1 VIEW: CPT | Mod: 26,77,76

## 2022-01-01 PROCEDURE — 99238 HOSP IP/OBS DSCHRG MGMT 30/<: CPT

## 2022-01-01 PROCEDURE — 93930 UPPER EXTREMITY STUDY: CPT | Mod: 26

## 2022-01-01 PROCEDURE — 95816 EEG AWAKE AND DROWSY: CPT | Mod: 26

## 2022-01-01 PROCEDURE — 70450 CT HEAD/BRAIN W/O DYE: CPT | Mod: 26

## 2022-01-01 PROCEDURE — 70486 CT MAXILLOFACIAL W/O DYE: CPT | Mod: 26,MA

## 2022-01-01 PROCEDURE — 99223 1ST HOSP IP/OBS HIGH 75: CPT | Mod: GC

## 2022-01-01 PROCEDURE — 74176 CT ABD & PELVIS W/O CONTRAST: CPT | Mod: 26

## 2022-01-01 PROCEDURE — 99285 EMERGENCY DEPT VISIT HI MDM: CPT

## 2022-01-01 PROCEDURE — 70450 CT HEAD/BRAIN W/O DYE: CPT | Mod: 26,MA

## 2022-01-01 PROCEDURE — 99348 HOME/RES VST EST LOW MDM 30: CPT | Mod: 95

## 2022-01-01 PROCEDURE — 73590 X-RAY EXAM OF LOWER LEG: CPT | Mod: 26,RT

## 2022-01-01 PROCEDURE — 93971 EXTREMITY STUDY: CPT | Mod: 26,RT

## 2022-01-01 PROCEDURE — 76770 US EXAM ABDO BACK WALL COMP: CPT | Mod: 26

## 2022-01-01 PROCEDURE — 73562 X-RAY EXAM OF KNEE 3: CPT | Mod: 26,LT

## 2022-01-01 PROCEDURE — 93010 ELECTROCARDIOGRAM REPORT: CPT | Mod: 77

## 2022-01-01 PROCEDURE — 99215 OFFICE O/P EST HI 40 MIN: CPT

## 2022-01-01 PROCEDURE — 72125 CT NECK SPINE W/O DYE: CPT | Mod: 26,MA

## 2022-01-01 PROCEDURE — 99221 1ST HOSP IP/OBS SF/LOW 40: CPT

## 2022-01-01 PROCEDURE — 73564 X-RAY EXAM KNEE 4 OR MORE: CPT | Mod: 26,RT

## 2022-01-01 PROCEDURE — 93925 LOWER EXTREMITY STUDY: CPT | Mod: 26

## 2022-01-01 RX ORDER — QUETIAPINE FUMARATE 200 MG/1
250 TABLET, FILM COATED ORAL
Qty: 0 | Refills: 0 | DISCHARGE

## 2022-01-01 RX ORDER — NOREPINEPHRINE BITARTRATE/D5W 8 MG/250ML
0.05 PLASTIC BAG, INJECTION (ML) INTRAVENOUS
Qty: 16 | Refills: 0 | Status: DISCONTINUED | OUTPATIENT
Start: 2022-01-01 | End: 2022-01-01

## 2022-01-01 RX ORDER — PHENYLEPHRINE HYDROCHLORIDE 10 MG/ML
0.1 INJECTION INTRAVENOUS
Qty: 160 | Refills: 0 | Status: DISCONTINUED | OUTPATIENT
Start: 2022-01-01 | End: 2022-01-01

## 2022-01-01 RX ORDER — APIXABAN 2.5 MG/1
5 TABLET, FILM COATED ORAL EVERY 12 HOURS
Refills: 0 | Status: DISCONTINUED | OUTPATIENT
Start: 2022-01-01 | End: 2022-01-01

## 2022-01-01 RX ORDER — METOPROLOL TARTRATE 50 MG
5 TABLET ORAL ONCE
Refills: 0 | Status: COMPLETED | OUTPATIENT
Start: 2022-01-01 | End: 2022-01-01

## 2022-01-01 RX ORDER — ACETAMINOPHEN 500 MG
650 TABLET ORAL EVERY 8 HOURS
Refills: 0 | Status: DISCONTINUED | OUTPATIENT
Start: 2022-01-01 | End: 2022-01-01

## 2022-01-01 RX ORDER — PHENAZOPYRIDINE HCL 100 MG
200 TABLET ORAL EVERY 8 HOURS
Refills: 0 | Status: COMPLETED | OUTPATIENT
Start: 2022-01-01 | End: 2022-01-01

## 2022-01-01 RX ORDER — ASPIRIN/CALCIUM CARB/MAGNESIUM 324 MG
81 TABLET ORAL DAILY
Refills: 0 | Status: DISCONTINUED | OUTPATIENT
Start: 2022-01-01 | End: 2022-01-01

## 2022-01-01 RX ORDER — GABAPENTIN 400 MG/1
400 CAPSULE ORAL
Refills: 0 | Status: DISCONTINUED | OUTPATIENT
Start: 2022-01-01 | End: 2022-01-01

## 2022-01-01 RX ORDER — BUDESONIDE AND FORMOTEROL FUMARATE DIHYDRATE 160; 4.5 UG/1; UG/1
2 AEROSOL RESPIRATORY (INHALATION)
Refills: 0 | Status: DISCONTINUED | OUTPATIENT
Start: 2022-01-01 | End: 2022-01-01

## 2022-01-01 RX ORDER — METOPROLOL TARTRATE 50 MG
75 TABLET ORAL
Refills: 0 | Status: DISCONTINUED | OUTPATIENT
Start: 2022-01-01 | End: 2022-01-01

## 2022-01-01 RX ORDER — PANTOPRAZOLE SODIUM 20 MG/1
40 TABLET, DELAYED RELEASE ORAL
Refills: 0 | Status: DISCONTINUED | OUTPATIENT
Start: 2022-01-01 | End: 2022-01-01

## 2022-01-01 RX ORDER — GABAPENTIN 400 MG/1
300 CAPSULE ORAL THREE TIMES A DAY
Refills: 0 | Status: DISCONTINUED | OUTPATIENT
Start: 2022-01-01 | End: 2022-01-01

## 2022-01-01 RX ORDER — DIPHENHYDRAMINE HCL 50 MG
50 CAPSULE ORAL ONCE
Refills: 0 | Status: DISCONTINUED | OUTPATIENT
Start: 2022-01-01 | End: 2022-01-01

## 2022-01-01 RX ORDER — METRONIDAZOLE 7.5 MG/G
0.75 GEL VAGINAL
Qty: 70 | Refills: 0 | Status: COMPLETED | COMMUNITY
Start: 2022-01-01

## 2022-01-01 RX ORDER — TIOTROPIUM BROMIDE 18 UG/1
2 CAPSULE ORAL; RESPIRATORY (INHALATION) DAILY
Refills: 0 | Status: DISCONTINUED | OUTPATIENT
Start: 2022-01-01 | End: 2022-01-01

## 2022-01-01 RX ORDER — FUROSEMIDE 40 MG/1
40 TABLET ORAL DAILY
Qty: 90 | Refills: 3 | Status: ACTIVE | COMMUNITY
Start: 1900-01-01 | End: 1900-01-01

## 2022-01-01 RX ORDER — IPRATROPIUM BROMIDE 0.2 MG/ML
4 SOLUTION, NON-ORAL INHALATION EVERY 6 HOURS
Refills: 0 | Status: DISCONTINUED | OUTPATIENT
Start: 2022-01-01 | End: 2022-01-01

## 2022-01-01 RX ORDER — SODIUM CHLORIDE 9 MG/ML
1000 INJECTION INTRAMUSCULAR; INTRAVENOUS; SUBCUTANEOUS
Refills: 0 | Status: DISCONTINUED | OUTPATIENT
Start: 2022-01-01 | End: 2022-01-01

## 2022-01-01 RX ORDER — CEFTRIAXONE 500 MG/1
1000 INJECTION, POWDER, FOR SOLUTION INTRAMUSCULAR; INTRAVENOUS EVERY 24 HOURS
Refills: 0 | Status: DISCONTINUED | OUTPATIENT
Start: 2022-01-01 | End: 2022-01-01

## 2022-01-01 RX ORDER — METHOCARBAMOL 500 MG/1
500 TABLET, FILM COATED ORAL
Refills: 0 | Status: DISCONTINUED | OUTPATIENT
Start: 2022-01-01 | End: 2022-01-01

## 2022-01-01 RX ORDER — FUROSEMIDE 40 MG
20 TABLET ORAL ONCE
Refills: 0 | Status: COMPLETED | OUTPATIENT
Start: 2022-01-01 | End: 2022-01-01

## 2022-01-01 RX ORDER — AMLODIPINE BESYLATE 2.5 MG/1
2.5 TABLET ORAL DAILY
Refills: 0 | Status: DISCONTINUED | OUTPATIENT
Start: 2022-01-01 | End: 2022-01-01

## 2022-01-01 RX ORDER — LAMOTRIGINE 100 MG/1
100 TABLET ORAL
Refills: 0 | Status: ACTIVE | COMMUNITY

## 2022-01-01 RX ORDER — DEXMEDETOMIDINE HYDROCHLORIDE IN 0.9% SODIUM CHLORIDE 4 UG/ML
0.2 INJECTION INTRAVENOUS
Qty: 400 | Refills: 0 | Status: DISCONTINUED | OUTPATIENT
Start: 2022-01-01 | End: 2022-01-01

## 2022-01-01 RX ORDER — ALBUTEROL 90 UG/1
0 AEROSOL, METERED ORAL
Qty: 0 | Refills: 0 | DISCHARGE

## 2022-01-01 RX ORDER — UMECLIDINIUM 62.5 UG/1
62.5 AEROSOL, POWDER ORAL
Qty: 1 | Refills: 5 | Status: ACTIVE | COMMUNITY
Start: 2021-03-23 | End: 1900-01-01

## 2022-01-01 RX ORDER — MIDAZOLAM HYDROCHLORIDE 1 MG/ML
0.02 INJECTION, SOLUTION INTRAMUSCULAR; INTRAVENOUS
Qty: 100 | Refills: 0 | Status: DISCONTINUED | OUTPATIENT
Start: 2022-01-01 | End: 2022-01-01

## 2022-01-01 RX ORDER — FLUTICASONE FUROATE AND VILANTEROL TRIFENATATE 100; 25 UG/1; UG/1
1 POWDER RESPIRATORY (INHALATION) DAILY
Refills: 0 | Status: DISCONTINUED | OUTPATIENT
Start: 2022-01-01 | End: 2022-01-01

## 2022-01-01 RX ORDER — LAMOTRIGINE 25 MG/1
1 TABLET, ORALLY DISINTEGRATING ORAL
Qty: 0 | Refills: 0 | DISCHARGE

## 2022-01-01 RX ORDER — NOREPINEPHRINE BITARTRATE/D5W 8 MG/250ML
0.05 PLASTIC BAG, INJECTION (ML) INTRAVENOUS
Qty: 8 | Refills: 0 | Status: DISCONTINUED | OUTPATIENT
Start: 2022-01-01 | End: 2022-01-01

## 2022-01-01 RX ORDER — ENOXAPARIN SODIUM 100 MG/ML
40 INJECTION SUBCUTANEOUS EVERY 24 HOURS
Refills: 0 | Status: DISCONTINUED | OUTPATIENT
Start: 2022-01-01 | End: 2022-01-01

## 2022-01-01 RX ORDER — SODIUM CHLORIDE 9 MG/ML
1000 INJECTION, SOLUTION INTRAVENOUS ONCE
Refills: 0 | Status: COMPLETED | OUTPATIENT
Start: 2022-01-01 | End: 2022-01-01

## 2022-01-01 RX ORDER — CHLORHEXIDINE GLUCONATE 213 G/1000ML
1 SOLUTION TOPICAL
Refills: 0 | Status: DISCONTINUED | OUTPATIENT
Start: 2022-01-01 | End: 2022-01-01

## 2022-01-01 RX ORDER — FENTANYL CITRATE 50 UG/ML
0.5 INJECTION INTRAVENOUS
Qty: 2500 | Refills: 0 | Status: DISCONTINUED | OUTPATIENT
Start: 2022-01-01 | End: 2022-01-01

## 2022-01-01 RX ORDER — CHLORHEXIDINE GLUCONATE 213 G/1000ML
15 SOLUTION TOPICAL
Refills: 0 | Status: DISCONTINUED | OUTPATIENT
Start: 2022-01-01 | End: 2022-01-01

## 2022-01-01 RX ORDER — FUROSEMIDE 40 MG
1 TABLET ORAL
Qty: 0 | Refills: 0 | DISCHARGE

## 2022-01-01 RX ORDER — HYDROMORPHONE HYDROCHLORIDE 2 MG/ML
0.5 INJECTION INTRAMUSCULAR; INTRAVENOUS; SUBCUTANEOUS ONCE
Refills: 0 | Status: DISCONTINUED | OUTPATIENT
Start: 2022-01-01 | End: 2022-01-01

## 2022-01-01 RX ORDER — POLYETHYLENE GLYCOL 3350 17 G/17G
17 POWDER, FOR SOLUTION ORAL EVERY 12 HOURS
Refills: 0 | Status: DISCONTINUED | OUTPATIENT
Start: 2022-01-01 | End: 2022-01-01

## 2022-01-01 RX ORDER — HEPARIN SODIUM 5000 [USP'U]/ML
5000 INJECTION INTRAVENOUS; SUBCUTANEOUS ONCE
Refills: 0 | Status: COMPLETED | OUTPATIENT
Start: 2022-01-01 | End: 2022-01-01

## 2022-01-01 RX ORDER — METOPROLOL TARTRATE 50 MG
1 TABLET ORAL
Qty: 0 | Refills: 0 | DISCHARGE

## 2022-01-01 RX ORDER — CISATRACURIUM BESYLATE 2 MG/ML
10 INJECTION INTRAVENOUS ONCE
Refills: 0 | Status: COMPLETED | OUTPATIENT
Start: 2022-01-01 | End: 2022-01-01

## 2022-01-01 RX ORDER — LIDOCAINE 4 G/100G
1 CREAM TOPICAL DAILY
Refills: 0 | Status: DISCONTINUED | OUTPATIENT
Start: 2022-01-01 | End: 2022-01-01

## 2022-01-01 RX ORDER — CEFPODOXIME PROXETIL 100 MG
1 TABLET ORAL
Qty: 4 | Refills: 0
Start: 2022-01-01 | End: 2022-01-01

## 2022-01-01 RX ORDER — ACETAMINOPHEN 500 MG
650 TABLET ORAL ONCE
Refills: 0 | Status: COMPLETED | OUTPATIENT
Start: 2022-01-01 | End: 2022-01-01

## 2022-01-01 RX ORDER — ATORVASTATIN CALCIUM 80 MG/1
20 TABLET, FILM COATED ORAL AT BEDTIME
Refills: 0 | Status: DISCONTINUED | OUTPATIENT
Start: 2022-01-01 | End: 2022-01-01

## 2022-01-01 RX ORDER — ACETAMINOPHEN 500 MG
650 TABLET ORAL EVERY 6 HOURS
Refills: 0 | Status: DISCONTINUED | OUTPATIENT
Start: 2022-01-01 | End: 2022-01-01

## 2022-01-01 RX ORDER — FUROSEMIDE 40 MG
40 TABLET ORAL ONCE
Refills: 0 | Status: COMPLETED | OUTPATIENT
Start: 2022-01-01 | End: 2022-01-01

## 2022-01-01 RX ORDER — CEFPODOXIME PROXETIL 100 MG
1 TABLET ORAL
Qty: 14 | Refills: 0
Start: 2022-01-01 | End: 2022-01-01

## 2022-01-01 RX ORDER — TIOTROPIUM BROMIDE 18 UG/1
1 CAPSULE ORAL; RESPIRATORY (INHALATION) DAILY
Refills: 0 | Status: DISCONTINUED | OUTPATIENT
Start: 2022-01-01 | End: 2022-01-01

## 2022-01-01 RX ORDER — ALBUTEROL 90 UG/1
2 AEROSOL, METERED ORAL
Qty: 0 | Refills: 0 | DISCHARGE

## 2022-01-01 RX ORDER — FUROSEMIDE 40 MG
40 TABLET ORAL DAILY
Refills: 0 | Status: DISCONTINUED | OUTPATIENT
Start: 2022-01-01 | End: 2022-01-01

## 2022-01-01 RX ORDER — CEFEPIME 1 G/1
2000 INJECTION, POWDER, FOR SOLUTION INTRAMUSCULAR; INTRAVENOUS ONCE
Refills: 0 | Status: COMPLETED | OUTPATIENT
Start: 2022-01-01 | End: 2022-01-01

## 2022-01-01 RX ORDER — MORPHINE SULFATE 50 MG/1
1 CAPSULE, EXTENDED RELEASE ORAL EVERY 6 HOURS
Refills: 0 | Status: DISCONTINUED | OUTPATIENT
Start: 2022-01-01 | End: 2022-01-01

## 2022-01-01 RX ORDER — AMLODIPINE BESYLATE 2.5 MG/1
5 TABLET ORAL DAILY
Refills: 0 | Status: DISCONTINUED | OUTPATIENT
Start: 2022-01-01 | End: 2022-01-01

## 2022-01-01 RX ORDER — IPRATROPIUM/ALBUTEROL SULFATE 18-103MCG
3 AEROSOL WITH ADAPTER (GRAM) INHALATION EVERY 6 HOURS
Refills: 0 | Status: DISCONTINUED | OUTPATIENT
Start: 2022-01-01 | End: 2022-01-01

## 2022-01-01 RX ORDER — KETOROLAC TROMETHAMINE 30 MG/ML
15 SYRINGE (ML) INJECTION ONCE
Refills: 0 | Status: DISCONTINUED | OUTPATIENT
Start: 2022-01-01 | End: 2022-01-01

## 2022-01-01 RX ORDER — PANTOPRAZOLE SODIUM 20 MG/1
40 TABLET, DELAYED RELEASE ORAL DAILY
Refills: 0 | Status: DISCONTINUED | OUTPATIENT
Start: 2022-01-01 | End: 2022-01-01

## 2022-01-01 RX ORDER — UMECLIDINIUM 62.5 UG/1
1 AEROSOL, POWDER ORAL
Qty: 0 | Refills: 0 | DISCHARGE

## 2022-01-01 RX ORDER — LORATADINE 10 MG/1
10 TABLET ORAL DAILY
Refills: 0 | Status: DISCONTINUED | OUTPATIENT
Start: 2022-01-01 | End: 2022-01-01

## 2022-01-01 RX ORDER — POLYETHYLENE GLYCOL 3350 17 G/17G
17 POWDER, FOR SOLUTION ORAL
Qty: 0 | Refills: 0 | DISCHARGE
Start: 2022-01-01

## 2022-01-01 RX ORDER — IPRATROPIUM/ALBUTEROL SULFATE 18-103MCG
3 AEROSOL WITH ADAPTER (GRAM) INHALATION ONCE
Refills: 0 | Status: COMPLETED | OUTPATIENT
Start: 2022-01-01 | End: 2022-01-01

## 2022-01-01 RX ORDER — IPRATROPIUM/ALBUTEROL SULFATE 18-103MCG
4 AEROSOL WITH ADAPTER (GRAM) INHALATION EVERY 6 HOURS
Refills: 0 | Status: DISCONTINUED | OUTPATIENT
Start: 2022-01-01 | End: 2022-01-01

## 2022-01-01 RX ORDER — HEPARIN SODIUM 5000 [USP'U]/ML
5000 INJECTION INTRAVENOUS; SUBCUTANEOUS EVERY 8 HOURS
Refills: 0 | Status: DISCONTINUED | OUTPATIENT
Start: 2022-01-01 | End: 2022-01-01

## 2022-01-01 RX ORDER — POLYETHYLENE GLYCOL 3350 17 G/17G
17 POWDER, FOR SOLUTION ORAL DAILY
Refills: 0 | Status: DISCONTINUED | OUTPATIENT
Start: 2022-01-01 | End: 2022-01-01

## 2022-01-01 RX ORDER — POTASSIUM CHLORIDE 20 MEQ
20 PACKET (EA) ORAL ONCE
Refills: 0 | Status: COMPLETED | OUTPATIENT
Start: 2022-01-01 | End: 2022-01-01

## 2022-01-01 RX ORDER — OLANZAPINE 15 MG/1
10 TABLET, FILM COATED ORAL DAILY
Refills: 0 | Status: DISCONTINUED | OUTPATIENT
Start: 2022-01-01 | End: 2022-01-01

## 2022-01-01 RX ORDER — MONTELUKAST 4 MG/1
1 TABLET, CHEWABLE ORAL
Qty: 0 | Refills: 0 | DISCHARGE

## 2022-01-01 RX ORDER — FLUCONAZOLE 150 MG/1
150 TABLET ORAL
Qty: 1 | Refills: 0 | Status: COMPLETED | COMMUNITY
Start: 2022-01-01

## 2022-01-01 RX ORDER — PANTOPRAZOLE SODIUM 20 MG/1
40 TABLET, DELAYED RELEASE ORAL ONCE
Refills: 0 | Status: COMPLETED | OUTPATIENT
Start: 2022-01-01 | End: 2022-01-01

## 2022-01-01 RX ORDER — SODIUM CHLORIDE 9 MG/ML
1000 INJECTION INTRAMUSCULAR; INTRAVENOUS; SUBCUTANEOUS ONCE
Refills: 0 | Status: COMPLETED | OUTPATIENT
Start: 2022-01-01 | End: 2022-01-01

## 2022-01-01 RX ORDER — SODIUM CHLORIDE 9 MG/ML
1000 INJECTION, SOLUTION INTRAVENOUS
Refills: 0 | Status: COMPLETED | OUTPATIENT
Start: 2022-01-01 | End: 2022-01-01

## 2022-01-01 RX ORDER — EPINEPHRINE 0.3 MG/.3ML
0.01 INJECTION INTRAMUSCULAR; SUBCUTANEOUS
Qty: 8 | Refills: 0 | Status: DISCONTINUED | OUTPATIENT
Start: 2022-01-01 | End: 2022-01-01

## 2022-01-01 RX ORDER — SENNA PLUS 8.6 MG/1
2 TABLET ORAL AT BEDTIME
Refills: 0 | Status: DISCONTINUED | OUTPATIENT
Start: 2022-01-01 | End: 2022-01-01

## 2022-01-01 RX ORDER — VANCOMYCIN HCL 1 G
500 VIAL (EA) INTRAVENOUS EVERY 12 HOURS
Refills: 0 | Status: DISCONTINUED | OUTPATIENT
Start: 2022-01-01 | End: 2022-01-01

## 2022-01-01 RX ORDER — QUETIAPINE FUMARATE 200 MG/1
1 TABLET, FILM COATED ORAL
Qty: 0 | Refills: 0 | DISCHARGE

## 2022-01-01 RX ORDER — MORPHINE SULFATE 50 MG/1
2 CAPSULE, EXTENDED RELEASE ORAL EVERY 4 HOURS
Refills: 0 | Status: DISCONTINUED | OUTPATIENT
Start: 2022-01-01 | End: 2022-01-01

## 2022-01-01 RX ORDER — METRONIDAZOLE 500 MG
500 TABLET ORAL EVERY 8 HOURS
Refills: 0 | Status: DISCONTINUED | OUTPATIENT
Start: 2022-01-01 | End: 2022-01-01

## 2022-01-01 RX ORDER — MORPHINE SULFATE 50 MG/1
1 CAPSULE, EXTENDED RELEASE ORAL ONCE
Refills: 0 | Status: DISCONTINUED | OUTPATIENT
Start: 2022-01-01 | End: 2022-01-01

## 2022-01-01 RX ORDER — METHOCARBAMOL 500 MG/1
500 TABLET, FILM COATED ORAL ONCE
Refills: 0 | Status: COMPLETED | OUTPATIENT
Start: 2022-01-01 | End: 2022-01-01

## 2022-01-01 RX ORDER — ACETAMINOPHEN 500 MG
975 TABLET ORAL ONCE
Refills: 0 | Status: COMPLETED | OUTPATIENT
Start: 2022-01-01 | End: 2022-01-01

## 2022-01-01 RX ORDER — GABAPENTIN 400 MG/1
0 CAPSULE ORAL
Qty: 0 | Refills: 0 | DISCHARGE

## 2022-01-01 RX ORDER — HYDRALAZINE HCL 50 MG
10 TABLET ORAL ONCE
Refills: 0 | Status: COMPLETED | OUTPATIENT
Start: 2022-01-01 | End: 2022-01-01

## 2022-01-01 RX ORDER — SODIUM CHLORIDE 9 MG/ML
3 INJECTION INTRAMUSCULAR; INTRAVENOUS; SUBCUTANEOUS EVERY 8 HOURS
Refills: 0 | Status: DISCONTINUED | OUTPATIENT
Start: 2022-01-01 | End: 2022-01-01

## 2022-01-01 RX ORDER — ATORVASTATIN CALCIUM 80 MG/1
1 TABLET, FILM COATED ORAL
Qty: 0 | Refills: 0 | DISCHARGE

## 2022-01-01 RX ORDER — SODIUM CHLORIDE 9 MG/ML
500 INJECTION, SOLUTION INTRAVENOUS ONCE
Refills: 0 | Status: COMPLETED | OUTPATIENT
Start: 2022-01-01 | End: 2022-01-01

## 2022-01-01 RX ORDER — GABAPENTIN 400 MG/1
300 CAPSULE ORAL ONCE
Refills: 0 | Status: COMPLETED | OUTPATIENT
Start: 2022-01-01 | End: 2022-01-01

## 2022-01-01 RX ORDER — ATROPINE SULFATE 0.1 MG/ML
0.6 SYRINGE (ML) INJECTION ONCE
Refills: 0 | Status: DISCONTINUED | OUTPATIENT
Start: 2022-01-01 | End: 2022-01-01

## 2022-01-01 RX ORDER — LEVALBUTEROL HYDROCHLORIDE 0.63 MG/3ML
0.63 SOLUTION RESPIRATORY (INHALATION)
Qty: 75 | Refills: 0 | Status: ACTIVE | COMMUNITY
Start: 2022-01-01

## 2022-01-01 RX ORDER — CIPROFLOXACIN HYDROCHLORIDE 500 MG/1
500 TABLET, FILM COATED ORAL TWICE DAILY
Qty: 6 | Refills: 0 | Status: COMPLETED | COMMUNITY
Start: 2021-12-16 | End: 2022-01-01

## 2022-01-01 RX ORDER — FLUTICASONE FUROATE AND VILANTEROL TRIFENATATE 100; 25 UG/1; UG/1
1 POWDER RESPIRATORY (INHALATION)
Qty: 0 | Refills: 0 | DISCHARGE

## 2022-01-01 RX ORDER — FENTANYL CITRATE 50 UG/ML
25 INJECTION INTRAVENOUS ONCE
Refills: 0 | Status: DISCONTINUED | OUTPATIENT
Start: 2022-01-01 | End: 2022-01-01

## 2022-01-01 RX ORDER — MIDODRINE HYDROCHLORIDE 2.5 MG/1
10 TABLET ORAL ONCE
Refills: 0 | Status: COMPLETED | OUTPATIENT
Start: 2022-01-01 | End: 2022-01-01

## 2022-01-01 RX ORDER — MORPHINE SULFATE 50 MG/1
1 CAPSULE, EXTENDED RELEASE ORAL
Qty: 100 | Refills: 0 | Status: DISCONTINUED | OUTPATIENT
Start: 2022-01-01 | End: 2022-01-01

## 2022-01-01 RX ORDER — LACTULOSE 10 G/15ML
20 SOLUTION ORAL EVERY 6 HOURS
Refills: 0 | Status: DISCONTINUED | OUTPATIENT
Start: 2022-01-01 | End: 2022-01-01

## 2022-01-01 RX ORDER — GABAPENTIN 400 MG/1
1 CAPSULE ORAL
Qty: 0 | Refills: 0 | DISCHARGE

## 2022-01-01 RX ORDER — ALBUTEROL 90 UG/1
2 AEROSOL, METERED ORAL EVERY 6 HOURS
Refills: 0 | Status: DISCONTINUED | OUTPATIENT
Start: 2022-01-01 | End: 2022-01-01

## 2022-01-01 RX ORDER — PANTOPRAZOLE SODIUM 20 MG/1
1 TABLET, DELAYED RELEASE ORAL
Qty: 0 | Refills: 0 | DISCHARGE

## 2022-01-01 RX ORDER — AMLODIPINE BESYLATE 2.5 MG/1
1 TABLET ORAL
Qty: 0 | Refills: 0 | DISCHARGE

## 2022-01-01 RX ORDER — FLUTICASONE FUROATE AND VILANTEROL TRIFENATATE 200; 25 UG/1; UG/1
200-25 POWDER RESPIRATORY (INHALATION) DAILY
Qty: 60 | Refills: 0 | Status: COMPLETED | COMMUNITY
Start: 2021-03-23 | End: 2022-01-01

## 2022-01-01 RX ORDER — MAGNESIUM SULFATE 500 MG/ML
2 VIAL (ML) INJECTION ONCE
Refills: 0 | Status: COMPLETED | OUTPATIENT
Start: 2022-01-01 | End: 2022-01-01

## 2022-01-01 RX ORDER — FENTANYL CITRATE 50 UG/ML
25 INJECTION INTRAVENOUS EVERY 6 HOURS
Refills: 0 | Status: DISCONTINUED | OUTPATIENT
Start: 2022-01-01 | End: 2022-01-01

## 2022-01-01 RX ORDER — ACETAMINOPHEN 500 MG
1000 TABLET ORAL ONCE
Refills: 0 | Status: COMPLETED | OUTPATIENT
Start: 2022-01-01 | End: 2022-01-01

## 2022-01-01 RX ORDER — QUETIAPINE FUMARATE 200 MG/1
50 TABLET, FILM COATED ORAL AT BEDTIME
Refills: 0 | Status: DISCONTINUED | OUTPATIENT
Start: 2022-01-01 | End: 2022-01-01

## 2022-01-01 RX ORDER — METOPROLOL TARTRATE 75 MG/1
75 TABLET, FILM COATED ORAL TWICE DAILY
Qty: 180 | Refills: 3 | Status: ACTIVE | COMMUNITY
Start: 2021-10-28 | End: 1900-01-01

## 2022-01-01 RX ORDER — FUROSEMIDE 40 MG
20 TABLET ORAL DAILY
Refills: 0 | Status: DISCONTINUED | OUTPATIENT
Start: 2022-01-01 | End: 2022-01-01

## 2022-01-01 RX ORDER — LAMOTRIGINE 25 MG/1
100 TABLET, ORALLY DISINTEGRATING ORAL
Refills: 0 | Status: DISCONTINUED | OUTPATIENT
Start: 2022-01-01 | End: 2022-01-01

## 2022-01-01 RX ORDER — VASOPRESSIN 20 [USP'U]/ML
0.04 INJECTION INTRAVENOUS
Qty: 40 | Refills: 0 | Status: DISCONTINUED | OUTPATIENT
Start: 2022-01-01 | End: 2022-01-01

## 2022-01-01 RX ORDER — PHENAZOPYRIDINE HCL 100 MG
100 TABLET ORAL EVERY 8 HOURS
Refills: 0 | Status: DISCONTINUED | OUTPATIENT
Start: 2022-01-01 | End: 2022-01-01

## 2022-01-01 RX ORDER — KETOROLAC TROMETHAMINE 30 MG/ML
30 SYRINGE (ML) INJECTION ONCE
Refills: 0 | Status: DISCONTINUED | OUTPATIENT
Start: 2022-01-01 | End: 2022-01-01

## 2022-01-01 RX ORDER — LEVOCETIRIZINE DIHYDROCHLORIDE 0.5 MG/ML
1 SOLUTION ORAL
Qty: 0 | Refills: 0 | DISCHARGE

## 2022-01-01 RX ORDER — QUETIAPINE FUMARATE 200 MG/1
200 TABLET, FILM COATED ORAL AT BEDTIME
Refills: 0 | Status: DISCONTINUED | OUTPATIENT
Start: 2022-01-01 | End: 2022-01-01

## 2022-01-01 RX ORDER — ONDANSETRON 8 MG/1
8 TABLET, FILM COATED ORAL ONCE
Refills: 0 | Status: COMPLETED | OUTPATIENT
Start: 2022-01-01 | End: 2022-01-01

## 2022-01-01 RX ORDER — HYDROXYZINE HCL 10 MG
50 TABLET ORAL ONCE
Refills: 0 | Status: COMPLETED | OUTPATIENT
Start: 2022-01-01 | End: 2022-01-01

## 2022-01-01 RX ORDER — FLUOXETINE HCL 10 MG
20 CAPSULE ORAL DAILY
Refills: 0 | Status: DISCONTINUED | OUTPATIENT
Start: 2022-01-01 | End: 2022-01-01

## 2022-01-01 RX ORDER — PANTOPRAZOLE SODIUM 20 MG/1
40 TABLET, DELAYED RELEASE ORAL EVERY 12 HOURS
Refills: 0 | Status: DISCONTINUED | OUTPATIENT
Start: 2022-01-01 | End: 2022-01-01

## 2022-01-01 RX ORDER — PROPOFOL 10 MG/ML
10 INJECTION, EMULSION INTRAVENOUS
Qty: 1000 | Refills: 0 | Status: DISCONTINUED | OUTPATIENT
Start: 2022-01-01 | End: 2022-01-01

## 2022-01-01 RX ORDER — LIDOCAINE 4 G/100G
1 CREAM TOPICAL
Qty: 0 | Refills: 0 | DISCHARGE
Start: 2022-01-01

## 2022-01-01 RX ORDER — AMLODIPINE BESYLATE 2.5 MG/1
2.5 TABLET ORAL DAILY
Qty: 90 | Refills: 3 | Status: ACTIVE | COMMUNITY
Start: 1900-01-01 | End: 1900-01-01

## 2022-01-01 RX ORDER — ALBUTEROL 90 UG/1
6 AEROSOL, METERED ORAL EVERY 6 HOURS
Refills: 0 | Status: DISCONTINUED | OUTPATIENT
Start: 2022-01-01 | End: 2022-01-01

## 2022-01-01 RX ORDER — ALBUTEROL 90 UG/1
2 AEROSOL, METERED ORAL EVERY 8 HOURS
Refills: 0 | Status: DISCONTINUED | OUTPATIENT
Start: 2022-01-01 | End: 2022-01-01

## 2022-01-01 RX ORDER — AMIODARONE HYDROCHLORIDE 400 MG/1
0.5 TABLET ORAL
Qty: 900 | Refills: 0 | Status: DISCONTINUED | OUTPATIENT
Start: 2022-01-01 | End: 2022-01-01

## 2022-01-01 RX ORDER — METRONIDAZOLE 500 MG
TABLET ORAL
Refills: 0 | Status: DISCONTINUED | OUTPATIENT
Start: 2022-01-01 | End: 2022-01-01

## 2022-01-01 RX ORDER — ALBUTEROL 90 UG/1
4 AEROSOL, METERED ORAL EVERY 6 HOURS
Refills: 0 | Status: DISCONTINUED | OUTPATIENT
Start: 2022-01-01 | End: 2022-01-01

## 2022-01-01 RX ORDER — MORPHINE SULFATE 50 MG/1
2 CAPSULE, EXTENDED RELEASE ORAL
Refills: 0 | Status: DISCONTINUED | OUTPATIENT
Start: 2022-01-01 | End: 2022-01-01

## 2022-01-01 RX ORDER — MONTELUKAST 4 MG/1
10 TABLET, CHEWABLE ORAL DAILY
Refills: 0 | Status: DISCONTINUED | OUTPATIENT
Start: 2022-01-01 | End: 2022-01-01

## 2022-01-01 RX ORDER — CEFTRIAXONE 500 MG/1
1000 INJECTION, POWDER, FOR SOLUTION INTRAMUSCULAR; INTRAVENOUS EVERY 24 HOURS
Refills: 0 | Status: COMPLETED | OUTPATIENT
Start: 2022-01-01 | End: 2022-01-01

## 2022-01-01 RX ORDER — LIDOCAINE 4 G/100G
1 CREAM TOPICAL EVERY 24 HOURS
Refills: 0 | Status: DISCONTINUED | OUTPATIENT
Start: 2022-01-01 | End: 2022-01-01

## 2022-01-01 RX ORDER — AMIODARONE HYDROCHLORIDE 400 MG/1
1 TABLET ORAL
Qty: 900 | Refills: 0 | Status: DISCONTINUED | OUTPATIENT
Start: 2022-01-01 | End: 2022-01-01

## 2022-01-01 RX ORDER — ALBUTEROL SULFATE 90 UG/1
108 (90 BASE) INHALANT RESPIRATORY (INHALATION)
Qty: 1 | Refills: 5 | Status: ACTIVE | COMMUNITY
Start: 2022-01-01 | End: 1900-01-01

## 2022-01-01 RX ORDER — QUETIAPINE FUMARATE 200 MG/1
250 TABLET, FILM COATED ORAL AT BEDTIME
Refills: 0 | Status: DISCONTINUED | OUTPATIENT
Start: 2022-01-01 | End: 2022-01-01

## 2022-01-01 RX ORDER — ALBUTEROL 90 UG/1
1 AEROSOL, METERED ORAL EVERY 4 HOURS
Refills: 0 | Status: DISCONTINUED | OUTPATIENT
Start: 2022-01-01 | End: 2022-01-01

## 2022-01-01 RX ORDER — QUETIAPINE FUMARATE 200 MG/1
200 TABLET, FILM COATED ORAL DAILY
Refills: 0 | Status: DISCONTINUED | OUTPATIENT
Start: 2022-01-01 | End: 2022-01-01

## 2022-01-01 RX ORDER — ONDANSETRON 8 MG/1
4 TABLET, FILM COATED ORAL EVERY 8 HOURS
Refills: 0 | Status: DISCONTINUED | OUTPATIENT
Start: 2022-01-01 | End: 2022-01-01

## 2022-01-01 RX ORDER — ALBUTEROL 90 UG/1
1 AEROSOL, METERED ORAL EVERY 6 HOURS
Refills: 0 | Status: DISCONTINUED | OUTPATIENT
Start: 2022-01-01 | End: 2022-01-01

## 2022-01-01 RX ORDER — ATORVASTATIN CALCIUM 20 MG/1
20 TABLET, FILM COATED ORAL
Qty: 90 | Refills: 3 | Status: ACTIVE | COMMUNITY
Start: 2019-08-22 | End: 1900-01-01

## 2022-01-01 RX ORDER — DIAZEPAM 5 MG
5 TABLET ORAL ONCE
Refills: 0 | Status: DISCONTINUED | OUTPATIENT
Start: 2022-01-01 | End: 2022-01-01

## 2022-01-01 RX ORDER — METRONIDAZOLE 500 MG
500 TABLET ORAL ONCE
Refills: 0 | Status: COMPLETED | OUTPATIENT
Start: 2022-01-01 | End: 2022-01-01

## 2022-01-01 RX ORDER — UMECLIDINIUM 62.5 UG/1
1 AEROSOL, POWDER ORAL DAILY
Refills: 0 | Status: DISCONTINUED | OUTPATIENT
Start: 2022-01-01 | End: 2022-01-01

## 2022-01-01 RX ORDER — PHENYLEPHRINE HYDROCHLORIDE 10 MG/ML
0.1 INJECTION INTRAVENOUS
Qty: 40 | Refills: 0 | Status: DISCONTINUED | OUTPATIENT
Start: 2022-01-01 | End: 2022-01-01

## 2022-01-01 RX ORDER — MORPHINE SULFATE 50 MG/1
2 CAPSULE, EXTENDED RELEASE ORAL ONCE
Refills: 0 | Status: DISCONTINUED | OUTPATIENT
Start: 2022-01-01 | End: 2022-01-01

## 2022-01-01 RX ORDER — VANCOMYCIN HCL 1 G
1500 VIAL (EA) INTRAVENOUS ONCE
Refills: 0 | Status: COMPLETED | OUTPATIENT
Start: 2022-01-01 | End: 2022-01-01

## 2022-01-01 RX ORDER — APIXABAN 2.5 MG/1
1 TABLET, FILM COATED ORAL
Qty: 0 | Refills: 0 | DISCHARGE

## 2022-01-01 RX ORDER — CLONAZEPAM 1 MG
0.5 TABLET ORAL DAILY
Refills: 0 | Status: DISCONTINUED | OUTPATIENT
Start: 2022-01-01 | End: 2022-01-01

## 2022-01-01 RX ORDER — LEVOCETIRIZINE DIHYDROCHLORIDE 0.5 MG/ML
0 SOLUTION ORAL
Qty: 0 | Refills: 0 | DISCHARGE

## 2022-01-01 RX ORDER — CEFTRIAXONE 500 MG/1
1000 INJECTION, POWDER, FOR SOLUTION INTRAMUSCULAR; INTRAVENOUS ONCE
Refills: 0 | Status: DISCONTINUED | OUTPATIENT
Start: 2022-01-01 | End: 2022-01-01

## 2022-01-01 RX ORDER — CHLORHEXIDINE GLUCONATE 213 G/1000ML
1 SOLUTION TOPICAL DAILY
Refills: 0 | Status: DISCONTINUED | OUTPATIENT
Start: 2022-01-01 | End: 2022-01-01

## 2022-01-01 RX ORDER — QUETIAPINE FUMARATE 200 MG/1
0 TABLET, FILM COATED ORAL
Qty: 0 | Refills: 0 | DISCHARGE

## 2022-01-01 RX ORDER — MORPHINE SULFATE 50 MG/1
3 CAPSULE, EXTENDED RELEASE ORAL
Qty: 250 | Refills: 0 | Status: DISCONTINUED | OUTPATIENT
Start: 2022-01-01 | End: 2022-01-01

## 2022-01-01 RX ORDER — AMIODARONE HYDROCHLORIDE 400 MG/1
150 TABLET ORAL ONCE
Refills: 0 | Status: COMPLETED | OUTPATIENT
Start: 2022-01-01 | End: 2022-01-01

## 2022-01-01 RX ORDER — AMOXICILLIN AND CLAVULANATE POTASSIUM 875; 125 MG/1; MG/1
875-125 TABLET, COATED ORAL
Refills: 0 | Status: COMPLETED | COMMUNITY
End: 2022-01-01

## 2022-01-01 RX ORDER — SODIUM CHLORIDE 9 MG/ML
250 INJECTION INTRAMUSCULAR; INTRAVENOUS; SUBCUTANEOUS ONCE
Refills: 0 | Status: COMPLETED | OUTPATIENT
Start: 2022-01-01 | End: 2022-01-01

## 2022-01-01 RX ORDER — QUETIAPINE FUMARATE 200 MG/1
200 TABLET ORAL
Refills: 0 | Status: ACTIVE | COMMUNITY
Start: 2021-09-23

## 2022-01-01 RX ORDER — PHENAZOPYRIDINE HCL 100 MG
1 TABLET ORAL
Qty: 6 | Refills: 0
Start: 2022-01-01 | End: 2022-01-01

## 2022-01-01 RX ORDER — VANCOMYCIN HCL 1 G
1000 VIAL (EA) INTRAVENOUS ONCE
Refills: 0 | Status: COMPLETED | OUTPATIENT
Start: 2022-01-01 | End: 2022-01-01

## 2022-01-01 RX ORDER — ASPIRIN/CALCIUM CARB/MAGNESIUM 324 MG
0 TABLET ORAL
Qty: 0 | Refills: 0 | DISCHARGE

## 2022-01-01 RX ORDER — CEFTRIAXONE 500 MG/1
2000 INJECTION, POWDER, FOR SOLUTION INTRAMUSCULAR; INTRAVENOUS EVERY 24 HOURS
Refills: 0 | Status: COMPLETED | OUTPATIENT
Start: 2022-01-01 | End: 2022-01-01

## 2022-01-01 RX ORDER — ENOXAPARIN SODIUM 100 MG/ML
90 INJECTION SUBCUTANEOUS EVERY 12 HOURS
Refills: 0 | Status: DISCONTINUED | OUTPATIENT
Start: 2022-01-01 | End: 2022-01-01

## 2022-01-01 RX ORDER — DEXMEDETOMIDINE HYDROCHLORIDE IN 0.9% SODIUM CHLORIDE 4 UG/ML
0.05 INJECTION INTRAVENOUS
Qty: 400 | Refills: 0 | Status: DISCONTINUED | OUTPATIENT
Start: 2022-01-01 | End: 2022-01-01

## 2022-01-01 RX ORDER — ROBINUL 0.2 MG/ML
0.4 INJECTION INTRAMUSCULAR; INTRAVENOUS ONCE
Refills: 0 | Status: COMPLETED | OUTPATIENT
Start: 2022-01-01 | End: 2022-01-01

## 2022-01-01 RX ORDER — FENTANYL CITRATE 50 UG/ML
0.5 INJECTION INTRAVENOUS
Qty: 5000 | Refills: 0 | Status: DISCONTINUED | OUTPATIENT
Start: 2022-01-01 | End: 2022-01-01

## 2022-01-01 RX ORDER — GABAPENTIN 400 MG/1
400 CAPSULE ORAL THREE TIMES A DAY
Refills: 0 | Status: DISCONTINUED | OUTPATIENT
Start: 2022-01-01 | End: 2022-01-01

## 2022-01-01 RX ORDER — FLUTICASONE FUROATE AND VILANTEROL TRIFENATATE 200; 25 UG/1; UG/1
200-25 POWDER RESPIRATORY (INHALATION)
Qty: 1 | Refills: 5 | Status: ACTIVE | COMMUNITY
Start: 1900-01-01 | End: 1900-01-01

## 2022-01-01 RX ORDER — HYDROMORPHONE HYDROCHLORIDE 2 MG/ML
2 INJECTION INTRAMUSCULAR; INTRAVENOUS; SUBCUTANEOUS ONCE
Refills: 0 | Status: DISCONTINUED | OUTPATIENT
Start: 2022-01-01 | End: 2022-01-01

## 2022-01-01 RX ORDER — CEFEPIME 1 G/1
1000 INJECTION, POWDER, FOR SOLUTION INTRAMUSCULAR; INTRAVENOUS EVERY 12 HOURS
Refills: 0 | Status: DISCONTINUED | OUTPATIENT
Start: 2022-01-01 | End: 2022-01-01

## 2022-01-01 RX ORDER — DIPHENHYDRAMINE HCL 50 MG
50 CAPSULE ORAL ONCE
Refills: 0 | Status: COMPLETED | OUTPATIENT
Start: 2022-01-01 | End: 2022-01-01

## 2022-01-01 RX ORDER — FLUTICASONE FUROATE AND VILANTEROL TRIFENATATE 100; 25 UG/1; UG/1
0 POWDER RESPIRATORY (INHALATION)
Qty: 0 | Refills: 0 | DISCHARGE

## 2022-01-01 RX ORDER — APIXABAN 5 MG/1
5 TABLET, FILM COATED ORAL
Qty: 60 | Refills: 5 | Status: ACTIVE | COMMUNITY
Start: 2021-10-28 | End: 1900-01-01

## 2022-01-01 RX ADMIN — APIXABAN 5 MILLIGRAM(S): 2.5 TABLET, FILM COATED ORAL at 05:28

## 2022-01-01 RX ADMIN — Medication 650 MILLIGRAM(S): at 14:45

## 2022-01-01 RX ADMIN — GABAPENTIN 400 MILLIGRAM(S): 400 CAPSULE ORAL at 06:08

## 2022-01-01 RX ADMIN — Medication 4.07 MICROGRAM(S)/KG/MIN: at 14:19

## 2022-01-01 RX ADMIN — MONTELUKAST 10 MILLIGRAM(S): 4 TABLET, CHEWABLE ORAL at 13:54

## 2022-01-01 RX ADMIN — POLYETHYLENE GLYCOL 3350 17 GRAM(S): 17 POWDER, FOR SOLUTION ORAL at 14:06

## 2022-01-01 RX ADMIN — MORPHINE SULFATE 2 MILLIGRAM(S): 50 CAPSULE, EXTENDED RELEASE ORAL at 23:33

## 2022-01-01 RX ADMIN — MORPHINE SULFATE 2 MILLIGRAM(S): 50 CAPSULE, EXTENDED RELEASE ORAL at 07:24

## 2022-01-01 RX ADMIN — HEPARIN SODIUM 5000 UNIT(S): 5000 INJECTION INTRAVENOUS; SUBCUTANEOUS at 03:25

## 2022-01-01 RX ADMIN — CEFTRIAXONE 100 MILLIGRAM(S): 500 INJECTION, POWDER, FOR SOLUTION INTRAMUSCULAR; INTRAVENOUS at 21:33

## 2022-01-01 RX ADMIN — GABAPENTIN 400 MILLIGRAM(S): 400 CAPSULE ORAL at 17:30

## 2022-01-01 RX ADMIN — Medication 0.5 MILLIGRAM(S): at 21:02

## 2022-01-01 RX ADMIN — METHOCARBAMOL 500 MILLIGRAM(S): 500 TABLET, FILM COATED ORAL at 12:10

## 2022-01-01 RX ADMIN — SODIUM CHLORIDE 3 MILLILITER(S): 9 INJECTION INTRAMUSCULAR; INTRAVENOUS; SUBCUTANEOUS at 21:05

## 2022-01-01 RX ADMIN — GABAPENTIN 400 MILLIGRAM(S): 400 CAPSULE ORAL at 14:19

## 2022-01-01 RX ADMIN — GABAPENTIN 400 MILLIGRAM(S): 400 CAPSULE ORAL at 22:01

## 2022-01-01 RX ADMIN — APIXABAN 5 MILLIGRAM(S): 2.5 TABLET, FILM COATED ORAL at 05:42

## 2022-01-01 RX ADMIN — QUETIAPINE FUMARATE 250 MILLIGRAM(S): 200 TABLET, FILM COATED ORAL at 22:06

## 2022-01-01 RX ADMIN — LORATADINE 10 MILLIGRAM(S): 10 TABLET ORAL at 12:10

## 2022-01-01 RX ADMIN — Medication 1 MILLIGRAM(S): at 18:36

## 2022-01-01 RX ADMIN — QUETIAPINE FUMARATE 250 MILLIGRAM(S): 200 TABLET, FILM COATED ORAL at 21:54

## 2022-01-01 RX ADMIN — Medication 100 MILLIGRAM(S): at 19:08

## 2022-01-01 RX ADMIN — APIXABAN 5 MILLIGRAM(S): 2.5 TABLET, FILM COATED ORAL at 17:37

## 2022-01-01 RX ADMIN — Medication 50 MILLIGRAM(S): at 15:13

## 2022-01-01 RX ADMIN — Medication 3 MILLILITER(S): at 19:25

## 2022-01-01 RX ADMIN — GABAPENTIN 400 MILLIGRAM(S): 400 CAPSULE ORAL at 05:21

## 2022-01-01 RX ADMIN — SODIUM CHLORIDE 3 MILLILITER(S): 9 INJECTION INTRAMUSCULAR; INTRAVENOUS; SUBCUTANEOUS at 05:37

## 2022-01-01 RX ADMIN — CHLORHEXIDINE GLUCONATE 1 APPLICATION(S): 213 SOLUTION TOPICAL at 05:28

## 2022-01-01 RX ADMIN — Medication 2 MILLIGRAM(S): at 04:50

## 2022-01-01 RX ADMIN — Medication 81 MILLIGRAM(S): at 14:14

## 2022-01-01 RX ADMIN — Medication 4 PUFF(S): at 04:19

## 2022-01-01 RX ADMIN — Medication 650 MILLIGRAM(S): at 05:28

## 2022-01-01 RX ADMIN — Medication 5 MILLIGRAM(S): at 08:45

## 2022-01-01 RX ADMIN — CHLORHEXIDINE GLUCONATE 15 MILLILITER(S): 213 SOLUTION TOPICAL at 18:02

## 2022-01-01 RX ADMIN — ALBUTEROL 4 PUFF(S): 90 AEROSOL, METERED ORAL at 13:32

## 2022-01-01 RX ADMIN — Medication 81 MILLIGRAM(S): at 15:13

## 2022-01-01 RX ADMIN — PROPOFOL 5.44 MICROGRAM(S)/KG/MIN: 10 INJECTION, EMULSION INTRAVENOUS at 22:01

## 2022-01-01 RX ADMIN — Medication 4 PUFF(S): at 20:41

## 2022-01-01 RX ADMIN — Medication 4 PUFF(S): at 05:45

## 2022-01-01 RX ADMIN — Medication 81 MILLIGRAM(S): at 12:52

## 2022-01-01 RX ADMIN — POLYETHYLENE GLYCOL 3350 17 GRAM(S): 17 POWDER, FOR SOLUTION ORAL at 10:05

## 2022-01-01 RX ADMIN — LAMOTRIGINE 100 MILLIGRAM(S): 25 TABLET, ORALLY DISINTEGRATING ORAL at 17:30

## 2022-01-01 RX ADMIN — Medication 0.5 MILLIGRAM(S): at 12:47

## 2022-01-01 RX ADMIN — GABAPENTIN 400 MILLIGRAM(S): 400 CAPSULE ORAL at 18:14

## 2022-01-01 RX ADMIN — CHLORHEXIDINE GLUCONATE 1 APPLICATION(S): 213 SOLUTION TOPICAL at 06:05

## 2022-01-01 RX ADMIN — ALBUTEROL 4 PUFF(S): 90 AEROSOL, METERED ORAL at 09:22

## 2022-01-01 RX ADMIN — GABAPENTIN 400 MILLIGRAM(S): 400 CAPSULE ORAL at 21:42

## 2022-01-01 RX ADMIN — GABAPENTIN 400 MILLIGRAM(S): 400 CAPSULE ORAL at 06:22

## 2022-01-01 RX ADMIN — GABAPENTIN 300 MILLIGRAM(S): 400 CAPSULE ORAL at 17:22

## 2022-01-01 RX ADMIN — ATORVASTATIN CALCIUM 20 MILLIGRAM(S): 80 TABLET, FILM COATED ORAL at 21:26

## 2022-01-01 RX ADMIN — GABAPENTIN 400 MILLIGRAM(S): 400 CAPSULE ORAL at 05:29

## 2022-01-01 RX ADMIN — LAMOTRIGINE 100 MILLIGRAM(S): 25 TABLET, ORALLY DISINTEGRATING ORAL at 06:17

## 2022-01-01 RX ADMIN — CHLORHEXIDINE GLUCONATE 15 MILLILITER(S): 213 SOLUTION TOPICAL at 05:31

## 2022-01-01 RX ADMIN — Medication 5 MILLIGRAM(S): at 08:15

## 2022-01-01 RX ADMIN — LAMOTRIGINE 100 MILLIGRAM(S): 25 TABLET, ORALLY DISINTEGRATING ORAL at 17:52

## 2022-01-01 RX ADMIN — LAMOTRIGINE 100 MILLIGRAM(S): 25 TABLET, ORALLY DISINTEGRATING ORAL at 17:39

## 2022-01-01 RX ADMIN — AMIODARONE HYDROCHLORIDE 33.3 MG/MIN: 400 TABLET ORAL at 12:00

## 2022-01-01 RX ADMIN — Medication 8.5 MICROGRAM(S)/KG/MIN: at 00:30

## 2022-01-01 RX ADMIN — Medication 650 MILLIGRAM(S): at 14:14

## 2022-01-01 RX ADMIN — CEFTRIAXONE 100 MILLIGRAM(S): 500 INJECTION, POWDER, FOR SOLUTION INTRAMUSCULAR; INTRAVENOUS at 05:51

## 2022-01-01 RX ADMIN — Medication 4 PUFF(S): at 03:04

## 2022-01-01 RX ADMIN — Medication 40 MILLIGRAM(S): at 15:22

## 2022-01-01 RX ADMIN — QUETIAPINE FUMARATE 250 MILLIGRAM(S): 200 TABLET, FILM COATED ORAL at 21:33

## 2022-01-01 RX ADMIN — Medication 975 MILLIGRAM(S): at 11:38

## 2022-01-01 RX ADMIN — Medication 1 MILLIGRAM(S): at 19:23

## 2022-01-01 RX ADMIN — LAMOTRIGINE 100 MILLIGRAM(S): 25 TABLET, ORALLY DISINTEGRATING ORAL at 17:36

## 2022-01-01 RX ADMIN — ALBUTEROL 4 PUFF(S): 90 AEROSOL, METERED ORAL at 08:01

## 2022-01-01 RX ADMIN — Medication 1 MILLIGRAM(S): at 22:14

## 2022-01-01 RX ADMIN — ALBUTEROL 4 PUFF(S): 90 AEROSOL, METERED ORAL at 08:15

## 2022-01-01 RX ADMIN — CHLORHEXIDINE GLUCONATE 1 APPLICATION(S): 213 SOLUTION TOPICAL at 07:07

## 2022-01-01 RX ADMIN — LAMOTRIGINE 100 MILLIGRAM(S): 25 TABLET, ORALLY DISINTEGRATING ORAL at 05:46

## 2022-01-01 RX ADMIN — CEFEPIME 100 MILLIGRAM(S): 1 INJECTION, POWDER, FOR SOLUTION INTRAMUSCULAR; INTRAVENOUS at 05:13

## 2022-01-01 RX ADMIN — LORATADINE 10 MILLIGRAM(S): 10 TABLET ORAL at 14:07

## 2022-01-01 RX ADMIN — Medication 0.5 MILLIGRAM(S): at 05:00

## 2022-01-01 RX ADMIN — LACTULOSE 20 GRAM(S): 10 SOLUTION ORAL at 18:02

## 2022-01-01 RX ADMIN — ALBUTEROL 4 PUFF(S): 90 AEROSOL, METERED ORAL at 13:22

## 2022-01-01 RX ADMIN — Medication 650 MILLIGRAM(S): at 06:07

## 2022-01-01 RX ADMIN — ALBUTEROL 4 PUFF(S): 90 AEROSOL, METERED ORAL at 13:38

## 2022-01-01 RX ADMIN — PANTOPRAZOLE SODIUM 40 MILLIGRAM(S): 20 TABLET, DELAYED RELEASE ORAL at 05:26

## 2022-01-01 RX ADMIN — FENTANYL CITRATE 4.54 MICROGRAM(S)/KG/HR: 50 INJECTION INTRAVENOUS at 18:24

## 2022-01-01 RX ADMIN — Medication 200 MILLIGRAM(S): at 13:45

## 2022-01-01 RX ADMIN — FENTANYL CITRATE 4.54 MICROGRAM(S)/KG/HR: 50 INJECTION INTRAVENOUS at 07:29

## 2022-01-01 RX ADMIN — MORPHINE SULFATE 2 MILLIGRAM(S): 50 CAPSULE, EXTENDED RELEASE ORAL at 02:41

## 2022-01-01 RX ADMIN — Medication 4 PUFF(S): at 13:24

## 2022-01-01 RX ADMIN — ATORVASTATIN CALCIUM 20 MILLIGRAM(S): 80 TABLET, FILM COATED ORAL at 21:31

## 2022-01-01 RX ADMIN — APIXABAN 5 MILLIGRAM(S): 2.5 TABLET, FILM COATED ORAL at 06:41

## 2022-01-01 RX ADMIN — MORPHINE SULFATE 2 MILLIGRAM(S): 50 CAPSULE, EXTENDED RELEASE ORAL at 17:22

## 2022-01-01 RX ADMIN — LAMOTRIGINE 100 MILLIGRAM(S): 25 TABLET, ORALLY DISINTEGRATING ORAL at 17:50

## 2022-01-01 RX ADMIN — CHLORHEXIDINE GLUCONATE 1 APPLICATION(S): 213 SOLUTION TOPICAL at 05:36

## 2022-01-01 RX ADMIN — LIDOCAINE 1 PATCH: 4 CREAM TOPICAL at 19:41

## 2022-01-01 RX ADMIN — SODIUM CHLORIDE 3 MILLILITER(S): 9 INJECTION INTRAMUSCULAR; INTRAVENOUS; SUBCUTANEOUS at 22:43

## 2022-01-01 RX ADMIN — SENNA PLUS 2 TABLET(S): 8.6 TABLET ORAL at 21:04

## 2022-01-01 RX ADMIN — LAMOTRIGINE 100 MILLIGRAM(S): 25 TABLET, ORALLY DISINTEGRATING ORAL at 17:08

## 2022-01-01 RX ADMIN — QUETIAPINE FUMARATE 250 MILLIGRAM(S): 200 TABLET, FILM COATED ORAL at 21:41

## 2022-01-01 RX ADMIN — Medication 10 MILLIGRAM(S): at 15:31

## 2022-01-01 RX ADMIN — GABAPENTIN 400 MILLIGRAM(S): 400 CAPSULE ORAL at 05:51

## 2022-01-01 RX ADMIN — PANTOPRAZOLE SODIUM 40 MILLIGRAM(S): 20 TABLET, DELAYED RELEASE ORAL at 05:28

## 2022-01-01 RX ADMIN — HYDROMORPHONE HYDROCHLORIDE 2 MILLIGRAM(S): 2 INJECTION INTRAMUSCULAR; INTRAVENOUS; SUBCUTANEOUS at 10:48

## 2022-01-01 RX ADMIN — MONTELUKAST 10 MILLIGRAM(S): 4 TABLET, CHEWABLE ORAL at 13:09

## 2022-01-01 RX ADMIN — Medication 100 MILLIGRAM(S): at 22:07

## 2022-01-01 RX ADMIN — SODIUM CHLORIDE 2000 MILLILITER(S): 9 INJECTION INTRAMUSCULAR; INTRAVENOUS; SUBCUTANEOUS at 00:54

## 2022-01-01 RX ADMIN — Medication 5 MILLIGRAM(S): at 12:07

## 2022-01-01 RX ADMIN — LAMOTRIGINE 100 MILLIGRAM(S): 25 TABLET, ORALLY DISINTEGRATING ORAL at 05:48

## 2022-01-01 RX ADMIN — MONTELUKAST 10 MILLIGRAM(S): 4 TABLET, CHEWABLE ORAL at 12:42

## 2022-01-01 RX ADMIN — PROPOFOL 5.44 MICROGRAM(S)/KG/MIN: 10 INJECTION, EMULSION INTRAVENOUS at 12:38

## 2022-01-01 RX ADMIN — FENTANYL CITRATE 4.54 MICROGRAM(S)/KG/HR: 50 INJECTION INTRAVENOUS at 04:22

## 2022-01-01 RX ADMIN — GABAPENTIN 400 MILLIGRAM(S): 400 CAPSULE ORAL at 21:35

## 2022-01-01 RX ADMIN — Medication 4 PUFF(S): at 09:20

## 2022-01-01 RX ADMIN — CEFEPIME 100 MILLIGRAM(S): 1 INJECTION, POWDER, FOR SOLUTION INTRAMUSCULAR; INTRAVENOUS at 05:46

## 2022-01-01 RX ADMIN — PANTOPRAZOLE SODIUM 40 MILLIGRAM(S): 20 TABLET, DELAYED RELEASE ORAL at 12:52

## 2022-01-01 RX ADMIN — LAMOTRIGINE 100 MILLIGRAM(S): 25 TABLET, ORALLY DISINTEGRATING ORAL at 05:37

## 2022-01-01 RX ADMIN — ALBUTEROL 4 PUFF(S): 90 AEROSOL, METERED ORAL at 13:34

## 2022-01-01 RX ADMIN — Medication 60 MILLIGRAM(S): at 18:24

## 2022-01-01 RX ADMIN — PHENYLEPHRINE HYDROCHLORIDE 1.63 MICROGRAM(S)/KG/MIN: 10 INJECTION INTRAVENOUS at 13:28

## 2022-01-01 RX ADMIN — Medication 4 PUFF(S): at 13:38

## 2022-01-01 RX ADMIN — Medication 4 PUFF(S): at 05:18

## 2022-01-01 RX ADMIN — QUETIAPINE FUMARATE 250 MILLIGRAM(S): 200 TABLET, FILM COATED ORAL at 21:40

## 2022-01-01 RX ADMIN — HEPARIN SODIUM 5000 UNIT(S): 5000 INJECTION INTRAVENOUS; SUBCUTANEOUS at 20:01

## 2022-01-01 RX ADMIN — HEPARIN SODIUM 5000 UNIT(S): 5000 INJECTION INTRAVENOUS; SUBCUTANEOUS at 12:43

## 2022-01-01 RX ADMIN — MORPHINE SULFATE 2 MILLIGRAM(S): 50 CAPSULE, EXTENDED RELEASE ORAL at 21:16

## 2022-01-01 RX ADMIN — Medication 100 MILLIGRAM(S): at 22:43

## 2022-01-01 RX ADMIN — ALBUTEROL 4 PUFF(S): 90 AEROSOL, METERED ORAL at 21:01

## 2022-01-01 RX ADMIN — Medication 10 MILLIGRAM(S): at 08:33

## 2022-01-01 RX ADMIN — Medication 4 PUFF(S): at 21:01

## 2022-01-01 RX ADMIN — Medication 40 MILLIGRAM(S): at 05:06

## 2022-01-01 RX ADMIN — Medication 4 PUFF(S): at 13:33

## 2022-01-01 RX ADMIN — MORPHINE SULFATE 2 MILLIGRAM(S): 50 CAPSULE, EXTENDED RELEASE ORAL at 17:39

## 2022-01-01 RX ADMIN — APIXABAN 5 MILLIGRAM(S): 2.5 TABLET, FILM COATED ORAL at 17:36

## 2022-01-01 RX ADMIN — FENTANYL CITRATE 4.54 MICROGRAM(S)/KG/HR: 50 INJECTION INTRAVENOUS at 05:53

## 2022-01-01 RX ADMIN — Medication 100 MILLIGRAM(S): at 05:40

## 2022-01-01 RX ADMIN — Medication 81 MILLIGRAM(S): at 11:10

## 2022-01-01 RX ADMIN — GABAPENTIN 400 MILLIGRAM(S): 400 CAPSULE ORAL at 22:42

## 2022-01-01 RX ADMIN — Medication 300 MILLIGRAM(S): at 00:53

## 2022-01-01 RX ADMIN — Medication 650 MILLIGRAM(S): at 18:17

## 2022-01-01 RX ADMIN — POLYETHYLENE GLYCOL 3350 17 GRAM(S): 17 POWDER, FOR SOLUTION ORAL at 12:24

## 2022-01-01 RX ADMIN — SODIUM CHLORIDE 3 MILLILITER(S): 9 INJECTION INTRAMUSCULAR; INTRAVENOUS; SUBCUTANEOUS at 11:58

## 2022-01-01 RX ADMIN — GABAPENTIN 400 MILLIGRAM(S): 400 CAPSULE ORAL at 05:12

## 2022-01-01 RX ADMIN — ALBUTEROL 4 PUFF(S): 90 AEROSOL, METERED ORAL at 20:08

## 2022-01-01 RX ADMIN — GABAPENTIN 400 MILLIGRAM(S): 400 CAPSULE ORAL at 07:07

## 2022-01-01 RX ADMIN — Medication 75 MILLIGRAM(S): at 05:04

## 2022-01-01 RX ADMIN — SODIUM CHLORIDE 250 MILLILITER(S): 9 INJECTION INTRAMUSCULAR; INTRAVENOUS; SUBCUTANEOUS at 05:48

## 2022-01-01 RX ADMIN — QUETIAPINE FUMARATE 200 MILLIGRAM(S): 200 TABLET, FILM COATED ORAL at 21:31

## 2022-01-01 RX ADMIN — MORPHINE SULFATE 2 MILLIGRAM(S): 50 CAPSULE, EXTENDED RELEASE ORAL at 05:49

## 2022-01-01 RX ADMIN — SENNA PLUS 2 TABLET(S): 8.6 TABLET ORAL at 21:32

## 2022-01-01 RX ADMIN — CHLORHEXIDINE GLUCONATE 1 APPLICATION(S): 213 SOLUTION TOPICAL at 06:14

## 2022-01-01 RX ADMIN — CHLORHEXIDINE GLUCONATE 1 APPLICATION(S): 213 SOLUTION TOPICAL at 05:39

## 2022-01-01 RX ADMIN — CHLORHEXIDINE GLUCONATE 15 MILLILITER(S): 213 SOLUTION TOPICAL at 05:46

## 2022-01-01 RX ADMIN — LAMOTRIGINE 100 MILLIGRAM(S): 25 TABLET, ORALLY DISINTEGRATING ORAL at 17:57

## 2022-01-01 RX ADMIN — Medication 0.5 MILLIGRAM(S): at 12:39

## 2022-01-01 RX ADMIN — Medication 40 MILLIGRAM(S): at 06:07

## 2022-01-01 RX ADMIN — ATORVASTATIN CALCIUM 20 MILLIGRAM(S): 80 TABLET, FILM COATED ORAL at 21:46

## 2022-01-01 RX ADMIN — Medication 40 MILLIGRAM(S): at 05:43

## 2022-01-01 RX ADMIN — LAMOTRIGINE 100 MILLIGRAM(S): 25 TABLET, ORALLY DISINTEGRATING ORAL at 17:15

## 2022-01-01 RX ADMIN — MONTELUKAST 10 MILLIGRAM(S): 4 TABLET, CHEWABLE ORAL at 12:52

## 2022-01-01 RX ADMIN — Medication 3 MILLILITER(S): at 19:46

## 2022-01-01 RX ADMIN — Medication 81 MILLIGRAM(S): at 12:43

## 2022-01-01 RX ADMIN — ATORVASTATIN CALCIUM 20 MILLIGRAM(S): 80 TABLET, FILM COATED ORAL at 22:12

## 2022-01-01 RX ADMIN — PANTOPRAZOLE SODIUM 40 MILLIGRAM(S): 20 TABLET, DELAYED RELEASE ORAL at 05:13

## 2022-01-01 RX ADMIN — GABAPENTIN 400 MILLIGRAM(S): 400 CAPSULE ORAL at 05:48

## 2022-01-01 RX ADMIN — SENNA PLUS 2 TABLET(S): 8.6 TABLET ORAL at 22:02

## 2022-01-01 RX ADMIN — Medication 4 PUFF(S): at 02:26

## 2022-01-01 RX ADMIN — Medication 100 MILLIGRAM(S): at 21:10

## 2022-01-01 RX ADMIN — PROPOFOL 5.44 MICROGRAM(S)/KG/MIN: 10 INJECTION, EMULSION INTRAVENOUS at 05:47

## 2022-01-01 RX ADMIN — Medication 3 MILLILITER(S): at 13:35

## 2022-01-01 RX ADMIN — LAMOTRIGINE 100 MILLIGRAM(S): 25 TABLET, ORALLY DISINTEGRATING ORAL at 17:37

## 2022-01-01 RX ADMIN — GABAPENTIN 400 MILLIGRAM(S): 400 CAPSULE ORAL at 05:47

## 2022-01-01 RX ADMIN — SODIUM CHLORIDE 75 MILLILITER(S): 9 INJECTION INTRAMUSCULAR; INTRAVENOUS; SUBCUTANEOUS at 18:04

## 2022-01-01 RX ADMIN — SENNA PLUS 2 TABLET(S): 8.6 TABLET ORAL at 21:26

## 2022-01-01 RX ADMIN — UMECLIDINIUM 1 PUFF(S): 62.5 AEROSOL, POWDER ORAL at 09:00

## 2022-01-01 RX ADMIN — CEFEPIME 100 MILLIGRAM(S): 1 INJECTION, POWDER, FOR SOLUTION INTRAMUSCULAR; INTRAVENOUS at 05:42

## 2022-01-01 RX ADMIN — Medication 75 MILLIGRAM(S): at 14:22

## 2022-01-01 RX ADMIN — GABAPENTIN 400 MILLIGRAM(S): 400 CAPSULE ORAL at 17:16

## 2022-01-01 RX ADMIN — CEFEPIME 100 MILLIGRAM(S): 1 INJECTION, POWDER, FOR SOLUTION INTRAMUSCULAR; INTRAVENOUS at 21:00

## 2022-01-01 RX ADMIN — Medication 975 MILLIGRAM(S): at 11:58

## 2022-01-01 RX ADMIN — PANTOPRAZOLE SODIUM 40 MILLIGRAM(S): 20 TABLET, DELAYED RELEASE ORAL at 06:57

## 2022-01-01 RX ADMIN — Medication 81 MILLIGRAM(S): at 11:50

## 2022-01-01 RX ADMIN — SODIUM CHLORIDE 3 MILLILITER(S): 9 INJECTION INTRAMUSCULAR; INTRAVENOUS; SUBCUTANEOUS at 05:53

## 2022-01-01 RX ADMIN — Medication 4 PUFF(S): at 15:06

## 2022-01-01 RX ADMIN — Medication 650 MILLIGRAM(S): at 21:30

## 2022-01-01 RX ADMIN — Medication 650 MILLIGRAM(S): at 08:37

## 2022-01-01 RX ADMIN — QUETIAPINE FUMARATE 200 MILLIGRAM(S): 200 TABLET, FILM COATED ORAL at 22:12

## 2022-01-01 RX ADMIN — CHLORHEXIDINE GLUCONATE 1 APPLICATION(S): 213 SOLUTION TOPICAL at 06:29

## 2022-01-01 RX ADMIN — Medication 4 PUFF(S): at 08:01

## 2022-01-01 RX ADMIN — Medication 81 MILLIGRAM(S): at 12:39

## 2022-01-01 RX ADMIN — Medication 0.5 MILLIGRAM(S): at 17:43

## 2022-01-01 RX ADMIN — Medication 975 MILLIGRAM(S): at 07:48

## 2022-01-01 RX ADMIN — SENNA PLUS 2 TABLET(S): 8.6 TABLET ORAL at 21:34

## 2022-01-01 RX ADMIN — CHLORHEXIDINE GLUCONATE 15 MILLILITER(S): 213 SOLUTION TOPICAL at 17:32

## 2022-01-01 RX ADMIN — CHLORHEXIDINE GLUCONATE 1 APPLICATION(S): 213 SOLUTION TOPICAL at 17:31

## 2022-01-01 RX ADMIN — CHLORHEXIDINE GLUCONATE 15 MILLILITER(S): 213 SOLUTION TOPICAL at 17:07

## 2022-01-01 RX ADMIN — Medication 4 PUFF(S): at 13:19

## 2022-01-01 RX ADMIN — LAMOTRIGINE 100 MILLIGRAM(S): 25 TABLET, ORALLY DISINTEGRATING ORAL at 05:04

## 2022-01-01 RX ADMIN — QUETIAPINE FUMARATE 250 MILLIGRAM(S): 200 TABLET, FILM COATED ORAL at 22:27

## 2022-01-01 RX ADMIN — Medication 1 TABLET(S): at 12:20

## 2022-01-01 RX ADMIN — Medication 40 MILLIGRAM(S): at 05:46

## 2022-01-01 RX ADMIN — AMLODIPINE BESYLATE 2.5 MILLIGRAM(S): 2.5 TABLET ORAL at 05:47

## 2022-01-01 RX ADMIN — CEFTRIAXONE 100 MILLIGRAM(S): 500 INJECTION, POWDER, FOR SOLUTION INTRAMUSCULAR; INTRAVENOUS at 23:01

## 2022-01-01 RX ADMIN — PHENYLEPHRINE HYDROCHLORIDE 3.4 MICROGRAM(S)/KG/MIN: 10 INJECTION INTRAVENOUS at 12:39

## 2022-01-01 RX ADMIN — GABAPENTIN 400 MILLIGRAM(S): 400 CAPSULE ORAL at 17:07

## 2022-01-01 RX ADMIN — PANTOPRAZOLE SODIUM 40 MILLIGRAM(S): 20 TABLET, DELAYED RELEASE ORAL at 17:49

## 2022-01-01 RX ADMIN — Medication 75 MILLIGRAM(S): at 05:26

## 2022-01-01 RX ADMIN — SODIUM CHLORIDE 3 MILLILITER(S): 9 INJECTION INTRAMUSCULAR; INTRAVENOUS; SUBCUTANEOUS at 22:00

## 2022-01-01 RX ADMIN — ALBUTEROL 4 PUFF(S): 90 AEROSOL, METERED ORAL at 20:43

## 2022-01-01 RX ADMIN — SODIUM CHLORIDE 3 MILLILITER(S): 9 INJECTION INTRAMUSCULAR; INTRAVENOUS; SUBCUTANEOUS at 07:08

## 2022-01-01 RX ADMIN — GABAPENTIN 400 MILLIGRAM(S): 400 CAPSULE ORAL at 05:15

## 2022-01-01 RX ADMIN — CEFTRIAXONE 100 MILLIGRAM(S): 500 INJECTION, POWDER, FOR SOLUTION INTRAMUSCULAR; INTRAVENOUS at 22:14

## 2022-01-01 RX ADMIN — Medication 250 MILLIGRAM(S): at 08:41

## 2022-01-01 RX ADMIN — POLYETHYLENE GLYCOL 3350 17 GRAM(S): 17 POWDER, FOR SOLUTION ORAL at 18:02

## 2022-01-01 RX ADMIN — LAMOTRIGINE 100 MILLIGRAM(S): 25 TABLET, ORALLY DISINTEGRATING ORAL at 05:47

## 2022-01-01 RX ADMIN — Medication 3 MILLILITER(S): at 08:06

## 2022-01-01 RX ADMIN — MONTELUKAST 10 MILLIGRAM(S): 4 TABLET, CHEWABLE ORAL at 14:15

## 2022-01-01 RX ADMIN — SODIUM CHLORIDE 75 MILLILITER(S): 9 INJECTION INTRAMUSCULAR; INTRAVENOUS; SUBCUTANEOUS at 00:59

## 2022-01-01 RX ADMIN — APIXABAN 5 MILLIGRAM(S): 2.5 TABLET, FILM COATED ORAL at 05:04

## 2022-01-01 RX ADMIN — ALBUTEROL 4 PUFF(S): 90 AEROSOL, METERED ORAL at 05:17

## 2022-01-01 RX ADMIN — Medication 25 GRAM(S): at 00:17

## 2022-01-01 RX ADMIN — QUETIAPINE FUMARATE 250 MILLIGRAM(S): 200 TABLET, FILM COATED ORAL at 21:29

## 2022-01-01 RX ADMIN — Medication 650 MILLIGRAM(S): at 23:00

## 2022-01-01 RX ADMIN — ATORVASTATIN CALCIUM 20 MILLIGRAM(S): 80 TABLET, FILM COATED ORAL at 22:06

## 2022-01-01 RX ADMIN — APIXABAN 5 MILLIGRAM(S): 2.5 TABLET, FILM COATED ORAL at 05:13

## 2022-01-01 RX ADMIN — LIDOCAINE 1 PATCH: 4 CREAM TOPICAL at 12:39

## 2022-01-01 RX ADMIN — Medication 650 MILLIGRAM(S): at 07:00

## 2022-01-01 RX ADMIN — Medication 81 MILLIGRAM(S): at 11:37

## 2022-01-01 RX ADMIN — Medication 650 MILLIGRAM(S): at 06:05

## 2022-01-01 RX ADMIN — LAMOTRIGINE 100 MILLIGRAM(S): 25 TABLET, ORALLY DISINTEGRATING ORAL at 05:26

## 2022-01-01 RX ADMIN — HEPARIN SODIUM 5000 UNIT(S): 5000 INJECTION INTRAVENOUS; SUBCUTANEOUS at 18:12

## 2022-01-01 RX ADMIN — Medication 0.5 MILLIGRAM(S): at 20:46

## 2022-01-01 RX ADMIN — Medication 15 MILLIGRAM(S): at 16:56

## 2022-01-01 RX ADMIN — MONTELUKAST 10 MILLIGRAM(S): 4 TABLET, CHEWABLE ORAL at 11:10

## 2022-01-01 RX ADMIN — DEXMEDETOMIDINE HYDROCHLORIDE IN 0.9% SODIUM CHLORIDE 4.54 MICROGRAM(S)/KG/HR: 4 INJECTION INTRAVENOUS at 06:43

## 2022-01-01 RX ADMIN — GABAPENTIN 300 MILLIGRAM(S): 400 CAPSULE ORAL at 13:34

## 2022-01-01 RX ADMIN — Medication 100 MILLIGRAM(S): at 14:15

## 2022-01-01 RX ADMIN — LACTULOSE 20 GRAM(S): 10 SOLUTION ORAL at 02:48

## 2022-01-01 RX ADMIN — CEFTRIAXONE 100 MILLIGRAM(S): 500 INJECTION, POWDER, FOR SOLUTION INTRAMUSCULAR; INTRAVENOUS at 18:38

## 2022-01-01 RX ADMIN — CHLORHEXIDINE GLUCONATE 1 APPLICATION(S): 213 SOLUTION TOPICAL at 05:15

## 2022-01-01 RX ADMIN — Medication 0.5 MILLIGRAM(S): at 10:59

## 2022-01-01 RX ADMIN — Medication 75 MILLIGRAM(S): at 05:48

## 2022-01-01 RX ADMIN — LORATADINE 10 MILLIGRAM(S): 10 TABLET ORAL at 11:50

## 2022-01-01 RX ADMIN — Medication 60 MILLIGRAM(S): at 17:33

## 2022-01-01 RX ADMIN — ALBUTEROL 4 PUFF(S): 90 AEROSOL, METERED ORAL at 20:41

## 2022-01-01 RX ADMIN — MORPHINE SULFATE 2 MILLIGRAM(S): 50 CAPSULE, EXTENDED RELEASE ORAL at 22:54

## 2022-01-01 RX ADMIN — ALBUTEROL 4 PUFF(S): 90 AEROSOL, METERED ORAL at 05:33

## 2022-01-01 RX ADMIN — LAMOTRIGINE 100 MILLIGRAM(S): 25 TABLET, ORALLY DISINTEGRATING ORAL at 06:23

## 2022-01-01 RX ADMIN — Medication 20 MILLIGRAM(S): at 03:48

## 2022-01-01 RX ADMIN — QUETIAPINE FUMARATE 250 MILLIGRAM(S): 200 TABLET, FILM COATED ORAL at 21:31

## 2022-01-01 RX ADMIN — ATORVASTATIN CALCIUM 20 MILLIGRAM(S): 80 TABLET, FILM COATED ORAL at 21:29

## 2022-01-01 RX ADMIN — Medication 100 MILLIGRAM(S): at 14:22

## 2022-01-01 RX ADMIN — Medication 650 MILLIGRAM(S): at 13:10

## 2022-01-01 RX ADMIN — APIXABAN 5 MILLIGRAM(S): 2.5 TABLET, FILM COATED ORAL at 17:25

## 2022-01-01 RX ADMIN — CEFEPIME 100 MILLIGRAM(S): 1 INJECTION, POWDER, FOR SOLUTION INTRAMUSCULAR; INTRAVENOUS at 17:17

## 2022-01-01 RX ADMIN — LAMOTRIGINE 100 MILLIGRAM(S): 25 TABLET, ORALLY DISINTEGRATING ORAL at 18:14

## 2022-01-01 RX ADMIN — QUETIAPINE FUMARATE 250 MILLIGRAM(S): 200 TABLET, FILM COATED ORAL at 22:03

## 2022-01-01 RX ADMIN — GABAPENTIN 400 MILLIGRAM(S): 400 CAPSULE ORAL at 12:48

## 2022-01-01 RX ADMIN — Medication 650 MILLIGRAM(S): at 05:05

## 2022-01-01 RX ADMIN — CHLORHEXIDINE GLUCONATE 15 MILLILITER(S): 213 SOLUTION TOPICAL at 06:23

## 2022-01-01 RX ADMIN — SENNA PLUS 2 TABLET(S): 8.6 TABLET ORAL at 21:29

## 2022-01-01 RX ADMIN — GABAPENTIN 400 MILLIGRAM(S): 400 CAPSULE ORAL at 21:31

## 2022-01-01 RX ADMIN — Medication 3 MILLILITER(S): at 07:50

## 2022-01-01 RX ADMIN — METHOCARBAMOL 500 MILLIGRAM(S): 500 TABLET, FILM COATED ORAL at 05:35

## 2022-01-01 RX ADMIN — Medication 4 PUFF(S): at 13:31

## 2022-01-01 RX ADMIN — MORPHINE SULFATE 1 MILLIGRAM(S): 50 CAPSULE, EXTENDED RELEASE ORAL at 07:04

## 2022-01-01 RX ADMIN — CHLORHEXIDINE GLUCONATE 1 APPLICATION(S): 213 SOLUTION TOPICAL at 05:52

## 2022-01-01 RX ADMIN — HEPARIN SODIUM 5000 UNIT(S): 5000 INJECTION INTRAVENOUS; SUBCUTANEOUS at 05:08

## 2022-01-01 RX ADMIN — QUETIAPINE FUMARATE 50 MILLIGRAM(S): 200 TABLET, FILM COATED ORAL at 21:31

## 2022-01-01 RX ADMIN — Medication 75 MILLIGRAM(S): at 17:51

## 2022-01-01 RX ADMIN — LAMOTRIGINE 100 MILLIGRAM(S): 25 TABLET, ORALLY DISINTEGRATING ORAL at 17:32

## 2022-01-01 RX ADMIN — ATORVASTATIN CALCIUM 20 MILLIGRAM(S): 80 TABLET, FILM COATED ORAL at 21:33

## 2022-01-01 RX ADMIN — PANTOPRAZOLE SODIUM 40 MILLIGRAM(S): 20 TABLET, DELAYED RELEASE ORAL at 06:52

## 2022-01-01 RX ADMIN — GABAPENTIN 400 MILLIGRAM(S): 400 CAPSULE ORAL at 14:15

## 2022-01-01 RX ADMIN — HEPARIN SODIUM 5000 UNIT(S): 5000 INJECTION INTRAVENOUS; SUBCUTANEOUS at 17:32

## 2022-01-01 RX ADMIN — PANTOPRAZOLE SODIUM 40 MILLIGRAM(S): 20 TABLET, DELAYED RELEASE ORAL at 05:47

## 2022-01-01 RX ADMIN — MORPHINE SULFATE 2 MILLIGRAM(S): 50 CAPSULE, EXTENDED RELEASE ORAL at 21:40

## 2022-01-01 RX ADMIN — METHOCARBAMOL 500 MILLIGRAM(S): 500 TABLET, FILM COATED ORAL at 05:24

## 2022-01-01 RX ADMIN — Medication 0.5 MILLIGRAM(S): at 10:49

## 2022-01-01 RX ADMIN — MORPHINE SULFATE 1 MILLIGRAM(S): 50 CAPSULE, EXTENDED RELEASE ORAL at 07:19

## 2022-01-01 RX ADMIN — CHLORHEXIDINE GLUCONATE 15 MILLILITER(S): 213 SOLUTION TOPICAL at 17:52

## 2022-01-01 RX ADMIN — ALBUTEROL 4 PUFF(S): 90 AEROSOL, METERED ORAL at 07:48

## 2022-01-01 RX ADMIN — Medication 3 MILLILITER(S): at 07:44

## 2022-01-01 RX ADMIN — Medication 40 MILLIGRAM(S): at 06:23

## 2022-01-01 RX ADMIN — SENNA PLUS 2 TABLET(S): 8.6 TABLET ORAL at 21:53

## 2022-01-01 RX ADMIN — FENTANYL CITRATE 4.54 MICROGRAM(S)/KG/HR: 50 INJECTION INTRAVENOUS at 12:38

## 2022-01-01 RX ADMIN — LAMOTRIGINE 100 MILLIGRAM(S): 25 TABLET, ORALLY DISINTEGRATING ORAL at 05:52

## 2022-01-01 RX ADMIN — APIXABAN 5 MILLIGRAM(S): 2.5 TABLET, FILM COATED ORAL at 17:30

## 2022-01-01 RX ADMIN — QUETIAPINE FUMARATE 250 MILLIGRAM(S): 200 TABLET, FILM COATED ORAL at 22:42

## 2022-01-01 RX ADMIN — CEFTRIAXONE 100 MILLIGRAM(S): 500 INJECTION, POWDER, FOR SOLUTION INTRAMUSCULAR; INTRAVENOUS at 05:23

## 2022-01-01 RX ADMIN — Medication 100 MILLIGRAM(S): at 06:08

## 2022-01-01 RX ADMIN — MONTELUKAST 10 MILLIGRAM(S): 4 TABLET, CHEWABLE ORAL at 13:38

## 2022-01-01 RX ADMIN — ALBUTEROL 4 PUFF(S): 90 AEROSOL, METERED ORAL at 15:06

## 2022-01-01 RX ADMIN — Medication 40 MILLIGRAM(S): at 05:47

## 2022-01-01 RX ADMIN — ATORVASTATIN CALCIUM 20 MILLIGRAM(S): 80 TABLET, FILM COATED ORAL at 21:39

## 2022-01-01 RX ADMIN — Medication 100 MILLIGRAM(S): at 05:24

## 2022-01-01 RX ADMIN — QUETIAPINE FUMARATE 50 MILLIGRAM(S): 200 TABLET, FILM COATED ORAL at 22:12

## 2022-01-01 RX ADMIN — SODIUM CHLORIDE 1000 MILLILITER(S): 9 INJECTION, SOLUTION INTRAVENOUS at 18:00

## 2022-01-01 RX ADMIN — BUDESONIDE AND FORMOTEROL FUMARATE DIHYDRATE 2 PUFF(S): 160; 4.5 AEROSOL RESPIRATORY (INHALATION) at 08:18

## 2022-01-01 RX ADMIN — FENTANYL CITRATE 25 MICROGRAM(S): 50 INJECTION INTRAVENOUS at 01:15

## 2022-01-01 RX ADMIN — QUETIAPINE FUMARATE 50 MILLIGRAM(S): 200 TABLET, FILM COATED ORAL at 22:14

## 2022-01-01 RX ADMIN — SODIUM CHLORIDE 500 MILLILITER(S): 9 INJECTION, SOLUTION INTRAVENOUS at 14:00

## 2022-01-01 RX ADMIN — POLYETHYLENE GLYCOL 3350 17 GRAM(S): 17 POWDER, FOR SOLUTION ORAL at 18:38

## 2022-01-01 RX ADMIN — QUETIAPINE FUMARATE 200 MILLIGRAM(S): 200 TABLET, FILM COATED ORAL at 22:14

## 2022-01-01 RX ADMIN — Medication 200 MILLIGRAM(S): at 13:57

## 2022-01-01 RX ADMIN — CEFTRIAXONE 100 MILLIGRAM(S): 500 INJECTION, POWDER, FOR SOLUTION INTRAMUSCULAR; INTRAVENOUS at 05:16

## 2022-01-01 RX ADMIN — Medication 75 MILLIGRAM(S): at 18:15

## 2022-01-01 RX ADMIN — QUETIAPINE FUMARATE 200 MILLIGRAM(S): 200 TABLET, FILM COATED ORAL at 11:45

## 2022-01-01 RX ADMIN — Medication 650 MILLIGRAM(S): at 23:06

## 2022-01-01 RX ADMIN — GABAPENTIN 300 MILLIGRAM(S): 400 CAPSULE ORAL at 05:51

## 2022-01-01 RX ADMIN — QUETIAPINE FUMARATE 200 MILLIGRAM(S): 200 TABLET, FILM COATED ORAL at 21:09

## 2022-01-01 RX ADMIN — ALBUTEROL 2 PUFF(S): 90 AEROSOL, METERED ORAL at 11:06

## 2022-01-01 RX ADMIN — Medication 3 MILLILITER(S): at 13:37

## 2022-01-01 RX ADMIN — MIDAZOLAM HYDROCHLORIDE 1.81 MG/KG/HR: 1 INJECTION, SOLUTION INTRAMUSCULAR; INTRAVENOUS at 18:24

## 2022-01-01 RX ADMIN — GABAPENTIN 400 MILLIGRAM(S): 400 CAPSULE ORAL at 17:38

## 2022-01-01 RX ADMIN — Medication 0.5 MILLIGRAM(S): at 22:13

## 2022-01-01 RX ADMIN — LAMOTRIGINE 100 MILLIGRAM(S): 25 TABLET, ORALLY DISINTEGRATING ORAL at 05:28

## 2022-01-01 RX ADMIN — ATORVASTATIN CALCIUM 20 MILLIGRAM(S): 80 TABLET, FILM COATED ORAL at 21:04

## 2022-01-01 RX ADMIN — Medication 0.5 MILLIGRAM(S): at 12:28

## 2022-01-01 RX ADMIN — CEFTRIAXONE 100 MILLIGRAM(S): 500 INJECTION, POWDER, FOR SOLUTION INTRAMUSCULAR; INTRAVENOUS at 18:16

## 2022-01-01 RX ADMIN — SENNA PLUS 2 TABLET(S): 8.6 TABLET ORAL at 21:50

## 2022-01-01 RX ADMIN — LAMOTRIGINE 100 MILLIGRAM(S): 25 TABLET, ORALLY DISINTEGRATING ORAL at 05:23

## 2022-01-01 RX ADMIN — POLYETHYLENE GLYCOL 3350 17 GRAM(S): 17 POWDER, FOR SOLUTION ORAL at 17:52

## 2022-01-01 RX ADMIN — QUETIAPINE FUMARATE 50 MILLIGRAM(S): 200 TABLET, FILM COATED ORAL at 21:26

## 2022-01-01 RX ADMIN — APIXABAN 5 MILLIGRAM(S): 2.5 TABLET, FILM COATED ORAL at 19:22

## 2022-01-01 RX ADMIN — ATORVASTATIN CALCIUM 20 MILLIGRAM(S): 80 TABLET, FILM COATED ORAL at 22:43

## 2022-01-01 RX ADMIN — PANTOPRAZOLE SODIUM 40 MILLIGRAM(S): 20 TABLET, DELAYED RELEASE ORAL at 12:46

## 2022-01-01 RX ADMIN — Medication 81 MILLIGRAM(S): at 12:46

## 2022-01-01 RX ADMIN — FLUTICASONE FUROATE AND VILANTEROL TRIFENATATE 1 PUFF(S): 100; 25 POWDER RESPIRATORY (INHALATION) at 08:19

## 2022-01-01 RX ADMIN — GABAPENTIN 300 MILLIGRAM(S): 400 CAPSULE ORAL at 21:00

## 2022-01-01 RX ADMIN — MONTELUKAST 10 MILLIGRAM(S): 4 TABLET, CHEWABLE ORAL at 11:54

## 2022-01-01 RX ADMIN — Medication 4 PUFF(S): at 20:48

## 2022-01-01 RX ADMIN — PANTOPRAZOLE SODIUM 40 MILLIGRAM(S): 20 TABLET, DELAYED RELEASE ORAL at 06:40

## 2022-01-01 RX ADMIN — PANTOPRAZOLE SODIUM 40 MILLIGRAM(S): 20 TABLET, DELAYED RELEASE ORAL at 22:57

## 2022-01-01 RX ADMIN — Medication 975 MILLIGRAM(S): at 14:08

## 2022-01-01 RX ADMIN — PANTOPRAZOLE SODIUM 40 MILLIGRAM(S): 20 TABLET, DELAYED RELEASE ORAL at 05:53

## 2022-01-01 RX ADMIN — MORPHINE SULFATE 2 MILLIGRAM(S): 50 CAPSULE, EXTENDED RELEASE ORAL at 15:50

## 2022-01-01 RX ADMIN — Medication 3 MILLILITER(S): at 01:43

## 2022-01-01 RX ADMIN — PANTOPRAZOLE SODIUM 40 MILLIGRAM(S): 20 TABLET, DELAYED RELEASE ORAL at 22:05

## 2022-01-01 RX ADMIN — LIDOCAINE 1 PATCH: 4 CREAM TOPICAL at 04:00

## 2022-01-01 RX ADMIN — Medication 40 MILLIGRAM(S): at 05:21

## 2022-01-01 RX ADMIN — LACTULOSE 20 GRAM(S): 10 SOLUTION ORAL at 05:15

## 2022-01-01 RX ADMIN — CEFTRIAXONE 100 MILLIGRAM(S): 500 INJECTION, POWDER, FOR SOLUTION INTRAMUSCULAR; INTRAVENOUS at 06:27

## 2022-01-01 RX ADMIN — Medication 1 TABLET(S): at 13:09

## 2022-01-01 RX ADMIN — MONTELUKAST 10 MILLIGRAM(S): 4 TABLET, CHEWABLE ORAL at 12:39

## 2022-01-01 RX ADMIN — GABAPENTIN 400 MILLIGRAM(S): 400 CAPSULE ORAL at 21:54

## 2022-01-01 RX ADMIN — Medication 50 MILLIEQUIVALENT(S): at 14:42

## 2022-01-01 RX ADMIN — Medication 3 MILLILITER(S): at 01:25

## 2022-01-01 RX ADMIN — Medication 4 PUFF(S): at 20:05

## 2022-01-01 RX ADMIN — GABAPENTIN 400 MILLIGRAM(S): 400 CAPSULE ORAL at 05:27

## 2022-01-01 RX ADMIN — GABAPENTIN 400 MILLIGRAM(S): 400 CAPSULE ORAL at 14:23

## 2022-01-01 RX ADMIN — ALBUTEROL 4 PUFF(S): 90 AEROSOL, METERED ORAL at 21:04

## 2022-01-01 RX ADMIN — ATORVASTATIN CALCIUM 20 MILLIGRAM(S): 80 TABLET, FILM COATED ORAL at 22:42

## 2022-01-01 RX ADMIN — GABAPENTIN 400 MILLIGRAM(S): 400 CAPSULE ORAL at 13:24

## 2022-01-01 RX ADMIN — Medication 1 MILLIGRAM(S): at 05:47

## 2022-01-01 RX ADMIN — Medication 15 MILLIGRAM(S): at 22:00

## 2022-01-01 RX ADMIN — Medication 81 MILLIGRAM(S): at 15:34

## 2022-01-01 RX ADMIN — Medication 100 MILLIGRAM(S): at 05:52

## 2022-01-01 RX ADMIN — LIDOCAINE 1 PATCH: 4 CREAM TOPICAL at 14:16

## 2022-01-01 RX ADMIN — Medication 4 PUFF(S): at 03:05

## 2022-01-01 RX ADMIN — QUETIAPINE FUMARATE 200 MILLIGRAM(S): 200 TABLET, FILM COATED ORAL at 21:12

## 2022-01-01 RX ADMIN — Medication 75 MILLIGRAM(S): at 17:58

## 2022-01-01 RX ADMIN — CEFTRIAXONE 100 MILLIGRAM(S): 500 INJECTION, POWDER, FOR SOLUTION INTRAMUSCULAR; INTRAVENOUS at 05:50

## 2022-01-01 RX ADMIN — Medication 650 MILLIGRAM(S): at 06:43

## 2022-01-01 RX ADMIN — Medication 75 MILLIGRAM(S): at 05:29

## 2022-01-01 RX ADMIN — Medication 100 MILLIGRAM(S): at 21:31

## 2022-01-01 RX ADMIN — SODIUM CHLORIDE 1000 MILLILITER(S): 9 INJECTION, SOLUTION INTRAVENOUS at 14:19

## 2022-01-01 RX ADMIN — Medication 75 MILLIGRAM(S): at 17:16

## 2022-01-01 RX ADMIN — ALBUTEROL 4 PUFF(S): 90 AEROSOL, METERED ORAL at 08:37

## 2022-01-01 RX ADMIN — CHLORHEXIDINE GLUCONATE 1 APPLICATION(S): 213 SOLUTION TOPICAL at 05:07

## 2022-01-01 RX ADMIN — LACTULOSE 20 GRAM(S): 10 SOLUTION ORAL at 02:03

## 2022-01-01 RX ADMIN — Medication 400 MILLIGRAM(S): at 21:11

## 2022-01-01 RX ADMIN — CISATRACURIUM BESYLATE 10 MILLIGRAM(S): 2 INJECTION INTRAVENOUS at 09:37

## 2022-01-01 RX ADMIN — Medication 100 MILLIGRAM(S): at 05:53

## 2022-01-01 RX ADMIN — ENOXAPARIN SODIUM 90 MILLIGRAM(S): 100 INJECTION SUBCUTANEOUS at 18:22

## 2022-01-01 RX ADMIN — AMLODIPINE BESYLATE 2.5 MILLIGRAM(S): 2.5 TABLET ORAL at 05:28

## 2022-01-01 RX ADMIN — CEFEPIME 100 MILLIGRAM(S): 1 INJECTION, POWDER, FOR SOLUTION INTRAMUSCULAR; INTRAVENOUS at 08:02

## 2022-01-01 RX ADMIN — FLUTICASONE FUROATE AND VILANTEROL TRIFENATATE 1 PUFF(S): 100; 25 POWDER RESPIRATORY (INHALATION) at 16:38

## 2022-01-01 RX ADMIN — LAMOTRIGINE 100 MILLIGRAM(S): 25 TABLET, ORALLY DISINTEGRATING ORAL at 05:51

## 2022-01-01 RX ADMIN — Medication 0.5 MILLIGRAM(S): at 12:40

## 2022-01-01 RX ADMIN — ENOXAPARIN SODIUM 90 MILLIGRAM(S): 100 INJECTION SUBCUTANEOUS at 06:00

## 2022-01-01 RX ADMIN — POLYETHYLENE GLYCOL 3350 17 GRAM(S): 17 POWDER, FOR SOLUTION ORAL at 17:07

## 2022-01-01 RX ADMIN — Medication 650 MILLIGRAM(S): at 18:32

## 2022-01-01 RX ADMIN — APIXABAN 5 MILLIGRAM(S): 2.5 TABLET, FILM COATED ORAL at 05:27

## 2022-01-01 RX ADMIN — LAMOTRIGINE 100 MILLIGRAM(S): 25 TABLET, ORALLY DISINTEGRATING ORAL at 05:21

## 2022-01-01 RX ADMIN — GABAPENTIN 400 MILLIGRAM(S): 400 CAPSULE ORAL at 05:37

## 2022-01-01 RX ADMIN — HEPARIN SODIUM 5000 UNIT(S): 5000 INJECTION INTRAVENOUS; SUBCUTANEOUS at 18:11

## 2022-01-01 RX ADMIN — Medication 0.5 MILLIGRAM(S): at 10:56

## 2022-01-01 RX ADMIN — APIXABAN 5 MILLIGRAM(S): 2.5 TABLET, FILM COATED ORAL at 17:50

## 2022-01-01 RX ADMIN — Medication 650 MILLIGRAM(S): at 12:10

## 2022-01-01 RX ADMIN — LACTULOSE 20 GRAM(S): 10 SOLUTION ORAL at 12:52

## 2022-01-01 RX ADMIN — DEXMEDETOMIDINE HYDROCHLORIDE IN 0.9% SODIUM CHLORIDE 4.54 MICROGRAM(S)/KG/HR: 4 INJECTION INTRAVENOUS at 04:21

## 2022-01-01 RX ADMIN — ATORVASTATIN CALCIUM 20 MILLIGRAM(S): 80 TABLET, FILM COATED ORAL at 21:11

## 2022-01-01 RX ADMIN — QUETIAPINE FUMARATE 200 MILLIGRAM(S): 200 TABLET, FILM COATED ORAL at 14:07

## 2022-01-01 RX ADMIN — Medication 0.5 MILLIGRAM(S): at 13:44

## 2022-01-01 RX ADMIN — ENOXAPARIN SODIUM 90 MILLIGRAM(S): 100 INJECTION SUBCUTANEOUS at 05:47

## 2022-01-01 RX ADMIN — Medication 650 MILLIGRAM(S): at 15:25

## 2022-01-01 RX ADMIN — Medication 40 MILLIGRAM(S): at 05:53

## 2022-01-01 RX ADMIN — AMLODIPINE BESYLATE 2.5 MILLIGRAM(S): 2.5 TABLET ORAL at 06:17

## 2022-01-01 RX ADMIN — HEPARIN SODIUM 5000 UNIT(S): 5000 INJECTION INTRAVENOUS; SUBCUTANEOUS at 22:13

## 2022-01-01 RX ADMIN — LAMOTRIGINE 100 MILLIGRAM(S): 25 TABLET, ORALLY DISINTEGRATING ORAL at 05:05

## 2022-01-01 RX ADMIN — GABAPENTIN 300 MILLIGRAM(S): 400 CAPSULE ORAL at 05:05

## 2022-01-01 RX ADMIN — GABAPENTIN 400 MILLIGRAM(S): 400 CAPSULE ORAL at 06:57

## 2022-01-01 RX ADMIN — ENOXAPARIN SODIUM 40 MILLIGRAM(S): 100 INJECTION SUBCUTANEOUS at 05:48

## 2022-01-01 RX ADMIN — GABAPENTIN 400 MILLIGRAM(S): 400 CAPSULE ORAL at 05:43

## 2022-01-01 RX ADMIN — GABAPENTIN 400 MILLIGRAM(S): 400 CAPSULE ORAL at 13:45

## 2022-01-01 RX ADMIN — SENNA PLUS 2 TABLET(S): 8.6 TABLET ORAL at 21:41

## 2022-01-01 RX ADMIN — CHLORHEXIDINE GLUCONATE 1 APPLICATION(S): 213 SOLUTION TOPICAL at 12:53

## 2022-01-01 RX ADMIN — Medication 81 MILLIGRAM(S): at 11:20

## 2022-01-01 RX ADMIN — Medication 75 MILLIGRAM(S): at 17:38

## 2022-01-01 RX ADMIN — PANTOPRAZOLE SODIUM 40 MILLIGRAM(S): 20 TABLET, DELAYED RELEASE ORAL at 06:17

## 2022-01-01 RX ADMIN — FENTANYL CITRATE 4.54 MICROGRAM(S)/KG/HR: 50 INJECTION INTRAVENOUS at 03:33

## 2022-01-01 RX ADMIN — CEFEPIME 100 MILLIGRAM(S): 1 INJECTION, POWDER, FOR SOLUTION INTRAMUSCULAR; INTRAVENOUS at 06:18

## 2022-01-01 RX ADMIN — Medication 0.5 MILLIGRAM(S): at 13:26

## 2022-01-01 RX ADMIN — Medication 200 MILLIGRAM(S): at 06:57

## 2022-01-01 RX ADMIN — ALBUTEROL 4 PUFF(S): 90 AEROSOL, METERED ORAL at 02:25

## 2022-01-01 RX ADMIN — Medication 100 MILLIGRAM(S): at 06:05

## 2022-01-01 RX ADMIN — SODIUM CHLORIDE 3 MILLILITER(S): 9 INJECTION INTRAMUSCULAR; INTRAVENOUS; SUBCUTANEOUS at 14:07

## 2022-01-01 RX ADMIN — HEPARIN SODIUM 5000 UNIT(S): 5000 INJECTION INTRAVENOUS; SUBCUTANEOUS at 11:06

## 2022-01-01 RX ADMIN — Medication 81 MILLIGRAM(S): at 11:46

## 2022-01-01 RX ADMIN — Medication 200 MILLIGRAM(S): at 22:13

## 2022-01-01 RX ADMIN — ENOXAPARIN SODIUM 90 MILLIGRAM(S): 100 INJECTION SUBCUTANEOUS at 17:52

## 2022-01-01 RX ADMIN — Medication 3 MILLILITER(S): at 13:28

## 2022-01-01 RX ADMIN — APIXABAN 5 MILLIGRAM(S): 2.5 TABLET, FILM COATED ORAL at 05:25

## 2022-01-01 RX ADMIN — Medication 40 MILLIGRAM(S): at 05:48

## 2022-01-01 RX ADMIN — Medication 75 MILLIGRAM(S): at 17:30

## 2022-01-01 RX ADMIN — ALBUTEROL 4 PUFF(S): 90 AEROSOL, METERED ORAL at 20:58

## 2022-01-01 RX ADMIN — BUDESONIDE AND FORMOTEROL FUMARATE DIHYDRATE 2 PUFF(S): 160; 4.5 AEROSOL RESPIRATORY (INHALATION) at 09:00

## 2022-01-01 RX ADMIN — POLYETHYLENE GLYCOL 3350 17 GRAM(S): 17 POWDER, FOR SOLUTION ORAL at 15:31

## 2022-01-01 RX ADMIN — APIXABAN 5 MILLIGRAM(S): 2.5 TABLET, FILM COATED ORAL at 18:12

## 2022-01-01 RX ADMIN — Medication 0.5 MILLIGRAM(S): at 06:00

## 2022-01-01 RX ADMIN — APIXABAN 5 MILLIGRAM(S): 2.5 TABLET, FILM COATED ORAL at 18:37

## 2022-01-01 RX ADMIN — MONTELUKAST 10 MILLIGRAM(S): 4 TABLET, CHEWABLE ORAL at 12:20

## 2022-01-01 RX ADMIN — GABAPENTIN 300 MILLIGRAM(S): 400 CAPSULE ORAL at 21:07

## 2022-01-01 RX ADMIN — LIDOCAINE 1 PATCH: 4 CREAM TOPICAL at 08:21

## 2022-01-01 RX ADMIN — Medication 81 MILLIGRAM(S): at 12:38

## 2022-01-01 RX ADMIN — POLYETHYLENE GLYCOL 3350 17 GRAM(S): 17 POWDER, FOR SOLUTION ORAL at 17:16

## 2022-01-01 RX ADMIN — AMLODIPINE BESYLATE 2.5 MILLIGRAM(S): 2.5 TABLET ORAL at 05:12

## 2022-01-01 RX ADMIN — Medication 5 MILLIGRAM(S): at 11:59

## 2022-01-01 RX ADMIN — SODIUM CHLORIDE 120 MILLILITER(S): 9 INJECTION INTRAMUSCULAR; INTRAVENOUS; SUBCUTANEOUS at 05:08

## 2022-01-01 RX ADMIN — SODIUM CHLORIDE 1000 MILLILITER(S): 9 INJECTION, SOLUTION INTRAVENOUS at 08:02

## 2022-01-01 RX ADMIN — MONTELUKAST 10 MILLIGRAM(S): 4 TABLET, CHEWABLE ORAL at 11:37

## 2022-01-01 RX ADMIN — CHLORHEXIDINE GLUCONATE 15 MILLILITER(S): 213 SOLUTION TOPICAL at 05:05

## 2022-01-01 RX ADMIN — GABAPENTIN 400 MILLIGRAM(S): 400 CAPSULE ORAL at 05:36

## 2022-01-01 RX ADMIN — UMECLIDINIUM 1 PUFF(S): 62.5 AEROSOL, POWDER ORAL at 16:39

## 2022-01-01 RX ADMIN — LAMOTRIGINE 100 MILLIGRAM(S): 25 TABLET, ORALLY DISINTEGRATING ORAL at 06:26

## 2022-01-01 RX ADMIN — DEXMEDETOMIDINE HYDROCHLORIDE IN 0.9% SODIUM CHLORIDE 4.54 MICROGRAM(S)/KG/HR: 4 INJECTION INTRAVENOUS at 12:38

## 2022-01-01 RX ADMIN — SENNA PLUS 2 TABLET(S): 8.6 TABLET ORAL at 22:06

## 2022-01-01 RX ADMIN — Medication 81 MILLIGRAM(S): at 11:54

## 2022-01-01 RX ADMIN — GABAPENTIN 400 MILLIGRAM(S): 400 CAPSULE ORAL at 05:05

## 2022-01-01 RX ADMIN — Medication 650 MILLIGRAM(S): at 11:10

## 2022-01-01 RX ADMIN — Medication 650 MILLIGRAM(S): at 16:43

## 2022-01-01 RX ADMIN — Medication 30 MILLIGRAM(S): at 19:27

## 2022-01-01 RX ADMIN — SODIUM CHLORIDE 3 MILLILITER(S): 9 INJECTION INTRAMUSCULAR; INTRAVENOUS; SUBCUTANEOUS at 17:54

## 2022-01-01 RX ADMIN — ALBUTEROL 4 PUFF(S): 90 AEROSOL, METERED ORAL at 03:02

## 2022-01-01 RX ADMIN — GABAPENTIN 400 MILLIGRAM(S): 400 CAPSULE ORAL at 21:32

## 2022-01-01 RX ADMIN — PANTOPRAZOLE SODIUM 40 MILLIGRAM(S): 20 TABLET, DELAYED RELEASE ORAL at 12:43

## 2022-01-01 RX ADMIN — FENTANYL CITRATE 25 MICROGRAM(S): 50 INJECTION INTRAVENOUS at 08:16

## 2022-01-01 RX ADMIN — Medication 650 MILLIGRAM(S): at 01:30

## 2022-01-01 RX ADMIN — Medication 0.5 MILLIGRAM(S): at 17:23

## 2022-01-01 RX ADMIN — Medication 650 MILLIGRAM(S): at 05:59

## 2022-01-01 RX ADMIN — ATORVASTATIN CALCIUM 20 MILLIGRAM(S): 80 TABLET, FILM COATED ORAL at 22:02

## 2022-01-01 RX ADMIN — CHLORHEXIDINE GLUCONATE 1 APPLICATION(S): 213 SOLUTION TOPICAL at 06:41

## 2022-01-01 RX ADMIN — Medication 0.5 MILLIGRAM(S): at 11:43

## 2022-01-01 RX ADMIN — LORATADINE 10 MILLIGRAM(S): 10 TABLET ORAL at 14:15

## 2022-01-01 RX ADMIN — Medication 0.5 MILLIGRAM(S): at 12:56

## 2022-01-01 RX ADMIN — Medication 650 MILLIGRAM(S): at 09:36

## 2022-01-01 RX ADMIN — FENTANYL CITRATE 25 MICROGRAM(S): 50 INJECTION INTRAVENOUS at 14:55

## 2022-01-01 RX ADMIN — MONTELUKAST 10 MILLIGRAM(S): 4 TABLET, CHEWABLE ORAL at 12:46

## 2022-01-01 RX ADMIN — Medication 3 MILLILITER(S): at 19:09

## 2022-01-01 RX ADMIN — Medication 75 MILLIGRAM(S): at 05:52

## 2022-01-01 RX ADMIN — AMIODARONE HYDROCHLORIDE 600 MILLIGRAM(S): 400 TABLET ORAL at 08:50

## 2022-01-01 RX ADMIN — CHLORHEXIDINE GLUCONATE 1 APPLICATION(S): 213 SOLUTION TOPICAL at 06:26

## 2022-01-01 RX ADMIN — HEPARIN SODIUM 5000 UNIT(S): 5000 INJECTION INTRAVENOUS; SUBCUTANEOUS at 02:53

## 2022-01-01 RX ADMIN — Medication 200 MILLIGRAM(S): at 21:13

## 2022-01-01 RX ADMIN — CEFEPIME 100 MILLIGRAM(S): 1 INJECTION, POWDER, FOR SOLUTION INTRAMUSCULAR; INTRAVENOUS at 18:14

## 2022-01-01 RX ADMIN — GABAPENTIN 400 MILLIGRAM(S): 400 CAPSULE ORAL at 17:25

## 2022-01-01 RX ADMIN — VASOPRESSIN 6 UNIT(S)/MIN: 20 INJECTION INTRAVENOUS at 04:22

## 2022-01-01 RX ADMIN — Medication 4 PUFF(S): at 13:22

## 2022-01-01 RX ADMIN — UMECLIDINIUM 1 PUFF(S): 62.5 AEROSOL, POWDER ORAL at 08:20

## 2022-01-01 RX ADMIN — GABAPENTIN 400 MILLIGRAM(S): 400 CAPSULE ORAL at 05:25

## 2022-01-01 RX ADMIN — MONTELUKAST 10 MILLIGRAM(S): 4 TABLET, CHEWABLE ORAL at 12:38

## 2022-01-01 RX ADMIN — LACTULOSE 20 GRAM(S): 10 SOLUTION ORAL at 05:52

## 2022-01-01 RX ADMIN — LORATADINE 10 MILLIGRAM(S): 10 TABLET ORAL at 17:17

## 2022-01-01 RX ADMIN — Medication 650 MILLIGRAM(S): at 14:17

## 2022-01-01 RX ADMIN — PANTOPRAZOLE SODIUM 40 MILLIGRAM(S): 20 TABLET, DELAYED RELEASE ORAL at 09:45

## 2022-01-01 RX ADMIN — Medication 650 MILLIGRAM(S): at 00:00

## 2022-01-01 RX ADMIN — MORPHINE SULFATE 1 MILLIGRAM(S): 50 CAPSULE, EXTENDED RELEASE ORAL at 19:06

## 2022-01-01 RX ADMIN — SENNA PLUS 2 TABLET(S): 8.6 TABLET ORAL at 21:09

## 2022-01-01 RX ADMIN — Medication 75 MILLIGRAM(S): at 06:07

## 2022-01-01 RX ADMIN — QUETIAPINE FUMARATE 250 MILLIGRAM(S): 200 TABLET, FILM COATED ORAL at 21:34

## 2022-01-01 RX ADMIN — POLYETHYLENE GLYCOL 3350 17 GRAM(S): 17 POWDER, FOR SOLUTION ORAL at 05:15

## 2022-01-01 RX ADMIN — LAMOTRIGINE 100 MILLIGRAM(S): 25 TABLET, ORALLY DISINTEGRATING ORAL at 05:15

## 2022-01-01 RX ADMIN — ALBUTEROL 4 PUFF(S): 90 AEROSOL, METERED ORAL at 05:46

## 2022-01-01 RX ADMIN — PANTOPRAZOLE SODIUM 40 MILLIGRAM(S): 20 TABLET, DELAYED RELEASE ORAL at 12:39

## 2022-01-01 RX ADMIN — Medication 1000 MILLIGRAM(S): at 21:26

## 2022-01-01 RX ADMIN — Medication 650 MILLIGRAM(S): at 13:43

## 2022-01-01 RX ADMIN — LIDOCAINE 1 PATCH: 4 CREAM TOPICAL at 18:12

## 2022-01-01 RX ADMIN — GABAPENTIN 400 MILLIGRAM(S): 400 CAPSULE ORAL at 17:50

## 2022-01-01 RX ADMIN — ALBUTEROL 4 PUFF(S): 90 AEROSOL, METERED ORAL at 03:05

## 2022-01-01 RX ADMIN — CEFTRIAXONE 100 MILLIGRAM(S): 500 INJECTION, POWDER, FOR SOLUTION INTRAMUSCULAR; INTRAVENOUS at 22:13

## 2022-01-01 RX ADMIN — QUETIAPINE FUMARATE 50 MILLIGRAM(S): 200 TABLET, FILM COATED ORAL at 21:09

## 2022-01-01 RX ADMIN — MIDAZOLAM HYDROCHLORIDE 1.81 MG/KG/HR: 1 INJECTION, SOLUTION INTRAMUSCULAR; INTRAVENOUS at 19:35

## 2022-01-01 RX ADMIN — Medication 81 MILLIGRAM(S): at 12:24

## 2022-01-01 RX ADMIN — APIXABAN 5 MILLIGRAM(S): 2.5 TABLET, FILM COATED ORAL at 06:57

## 2022-01-01 RX ADMIN — Medication 3 MILLILITER(S): at 09:06

## 2022-01-01 RX ADMIN — CEFEPIME 100 MILLIGRAM(S): 1 INJECTION, POWDER, FOR SOLUTION INTRAMUSCULAR; INTRAVENOUS at 17:25

## 2022-01-01 RX ADMIN — MONTELUKAST 10 MILLIGRAM(S): 4 TABLET, CHEWABLE ORAL at 12:10

## 2022-01-01 RX ADMIN — Medication 650 MILLIGRAM(S): at 14:22

## 2022-01-01 RX ADMIN — Medication 75 MILLIGRAM(S): at 18:37

## 2022-01-01 RX ADMIN — ENOXAPARIN SODIUM 90 MILLIGRAM(S): 100 INJECTION SUBCUTANEOUS at 17:07

## 2022-01-01 RX ADMIN — MONTELUKAST 10 MILLIGRAM(S): 4 TABLET, CHEWABLE ORAL at 12:36

## 2022-01-01 RX ADMIN — FLUTICASONE FUROATE AND VILANTEROL TRIFENATATE 1 PUFF(S): 100; 25 POWDER RESPIRATORY (INHALATION) at 09:00

## 2022-01-01 RX ADMIN — GABAPENTIN 400 MILLIGRAM(S): 400 CAPSULE ORAL at 21:29

## 2022-01-01 RX ADMIN — CHLORHEXIDINE GLUCONATE 1 APPLICATION(S): 213 SOLUTION TOPICAL at 05:06

## 2022-01-01 RX ADMIN — Medication 75 MILLIGRAM(S): at 05:43

## 2022-01-01 RX ADMIN — Medication 3 MILLILITER(S): at 19:43

## 2022-01-01 RX ADMIN — LAMOTRIGINE 100 MILLIGRAM(S): 25 TABLET, ORALLY DISINTEGRATING ORAL at 18:38

## 2022-01-01 RX ADMIN — Medication 60 MILLIGRAM(S): at 18:01

## 2022-01-01 RX ADMIN — Medication 650 MILLIGRAM(S): at 18:37

## 2022-01-01 RX ADMIN — ALBUTEROL 4 PUFF(S): 90 AEROSOL, METERED ORAL at 08:23

## 2022-01-01 RX ADMIN — SENNA PLUS 2 TABLET(S): 8.6 TABLET ORAL at 22:18

## 2022-01-01 RX ADMIN — LAMOTRIGINE 100 MILLIGRAM(S): 25 TABLET, ORALLY DISINTEGRATING ORAL at 18:10

## 2022-01-01 RX ADMIN — AMLODIPINE BESYLATE 2.5 MILLIGRAM(S): 2.5 TABLET ORAL at 05:32

## 2022-01-01 RX ADMIN — AMLODIPINE BESYLATE 5 MILLIGRAM(S): 2.5 TABLET ORAL at 05:51

## 2022-01-01 RX ADMIN — ALBUTEROL 4 PUFF(S): 90 AEROSOL, METERED ORAL at 20:35

## 2022-01-01 RX ADMIN — METHOCARBAMOL 500 MILLIGRAM(S): 500 TABLET, FILM COATED ORAL at 18:38

## 2022-01-01 RX ADMIN — APIXABAN 5 MILLIGRAM(S): 2.5 TABLET, FILM COATED ORAL at 17:15

## 2022-01-01 RX ADMIN — GABAPENTIN 300 MILLIGRAM(S): 400 CAPSULE ORAL at 05:07

## 2022-01-01 RX ADMIN — Medication 4 PUFF(S): at 07:48

## 2022-01-01 RX ADMIN — Medication 85 MILLIMOLE(S): at 08:23

## 2022-01-01 RX ADMIN — Medication 650 MILLIGRAM(S): at 06:26

## 2022-01-01 RX ADMIN — ATORVASTATIN CALCIUM 20 MILLIGRAM(S): 80 TABLET, FILM COATED ORAL at 22:14

## 2022-01-01 RX ADMIN — DEXMEDETOMIDINE HYDROCHLORIDE IN 0.9% SODIUM CHLORIDE 4.54 MICROGRAM(S)/KG/HR: 4 INJECTION INTRAVENOUS at 12:45

## 2022-01-01 RX ADMIN — CHLORHEXIDINE GLUCONATE 15 MILLILITER(S): 213 SOLUTION TOPICAL at 18:12

## 2022-01-01 RX ADMIN — APIXABAN 5 MILLIGRAM(S): 2.5 TABLET, FILM COATED ORAL at 05:52

## 2022-01-01 RX ADMIN — Medication 50 MILLIEQUIVALENT(S): at 13:42

## 2022-01-01 RX ADMIN — LAMOTRIGINE 100 MILLIGRAM(S): 25 TABLET, ORALLY DISINTEGRATING ORAL at 18:17

## 2022-01-01 RX ADMIN — GABAPENTIN 400 MILLIGRAM(S): 400 CAPSULE ORAL at 14:18

## 2022-01-01 RX ADMIN — MORPHINE SULFATE 2 MILLIGRAM(S): 50 CAPSULE, EXTENDED RELEASE ORAL at 09:25

## 2022-01-01 RX ADMIN — Medication 40 MILLIGRAM(S): at 09:52

## 2022-01-01 RX ADMIN — Medication 200 MILLIGRAM(S): at 05:26

## 2022-01-01 RX ADMIN — VASOPRESSIN 6 UNIT(S)/MIN: 20 INJECTION INTRAVENOUS at 12:39

## 2022-01-01 RX ADMIN — LIDOCAINE 1 PATCH: 4 CREAM TOPICAL at 05:41

## 2022-01-01 RX ADMIN — GABAPENTIN 400 MILLIGRAM(S): 400 CAPSULE ORAL at 14:35

## 2022-01-01 RX ADMIN — Medication 8.5 MICROGRAM(S)/KG/MIN: at 14:41

## 2022-01-01 RX ADMIN — LACTULOSE 20 GRAM(S): 10 SOLUTION ORAL at 12:39

## 2022-01-01 RX ADMIN — LAMOTRIGINE 100 MILLIGRAM(S): 25 TABLET, ORALLY DISINTEGRATING ORAL at 05:31

## 2022-01-01 RX ADMIN — PANTOPRAZOLE SODIUM 40 MILLIGRAM(S): 20 TABLET, DELAYED RELEASE ORAL at 11:06

## 2022-01-01 RX ADMIN — APIXABAN 5 MILLIGRAM(S): 2.5 TABLET, FILM COATED ORAL at 05:31

## 2022-01-01 RX ADMIN — Medication 3 MILLILITER(S): at 09:15

## 2022-01-01 RX ADMIN — Medication 0.5 MILLIGRAM(S): at 18:55

## 2022-01-01 RX ADMIN — AMLODIPINE BESYLATE 2.5 MILLIGRAM(S): 2.5 TABLET ORAL at 06:08

## 2022-01-01 RX ADMIN — SODIUM CHLORIDE 3 MILLILITER(S): 9 INJECTION INTRAMUSCULAR; INTRAVENOUS; SUBCUTANEOUS at 21:30

## 2022-01-01 RX ADMIN — GABAPENTIN 400 MILLIGRAM(S): 400 CAPSULE ORAL at 13:51

## 2022-01-01 RX ADMIN — Medication 81 MILLIGRAM(S): at 13:26

## 2022-01-01 RX ADMIN — Medication 975 MILLIGRAM(S): at 17:22

## 2022-01-01 RX ADMIN — BUDESONIDE AND FORMOTEROL FUMARATE DIHYDRATE 2 PUFF(S): 160; 4.5 AEROSOL RESPIRATORY (INHALATION) at 21:08

## 2022-01-01 RX ADMIN — Medication 75 MILLIGRAM(S): at 05:31

## 2022-01-01 RX ADMIN — GABAPENTIN 300 MILLIGRAM(S): 400 CAPSULE ORAL at 13:09

## 2022-01-01 RX ADMIN — LACTULOSE 20 GRAM(S): 10 SOLUTION ORAL at 18:10

## 2022-01-01 RX ADMIN — GABAPENTIN 400 MILLIGRAM(S): 400 CAPSULE ORAL at 05:31

## 2022-01-01 RX ADMIN — MIDODRINE HYDROCHLORIDE 10 MILLIGRAM(S): 2.5 TABLET ORAL at 01:17

## 2022-01-01 RX ADMIN — Medication 20 MILLIGRAM(S): at 05:47

## 2022-01-01 RX ADMIN — MONTELUKAST 10 MILLIGRAM(S): 4 TABLET, CHEWABLE ORAL at 14:21

## 2022-01-01 RX ADMIN — ATORVASTATIN CALCIUM 20 MILLIGRAM(S): 80 TABLET, FILM COATED ORAL at 21:40

## 2022-01-01 RX ADMIN — CHLORHEXIDINE GLUCONATE 1 APPLICATION(S): 213 SOLUTION TOPICAL at 05:34

## 2022-01-01 RX ADMIN — HEPARIN SODIUM 5000 UNIT(S): 5000 INJECTION INTRAVENOUS; SUBCUTANEOUS at 13:09

## 2022-01-01 RX ADMIN — ATORVASTATIN CALCIUM 20 MILLIGRAM(S): 80 TABLET, FILM COATED ORAL at 21:12

## 2022-01-01 RX ADMIN — Medication 650 MILLIGRAM(S): at 16:29

## 2022-01-01 RX ADMIN — Medication 4 PUFF(S): at 08:15

## 2022-01-01 RX ADMIN — MORPHINE SULFATE 2 MILLIGRAM(S): 50 CAPSULE, EXTENDED RELEASE ORAL at 06:51

## 2022-01-01 RX ADMIN — Medication 63.75 MILLIMOLE(S): at 11:43

## 2022-01-01 RX ADMIN — Medication 650 MILLIGRAM(S): at 22:54

## 2022-01-01 RX ADMIN — AMLODIPINE BESYLATE 2.5 MILLIGRAM(S): 2.5 TABLET ORAL at 05:52

## 2022-01-01 RX ADMIN — Medication 75 MILLIGRAM(S): at 05:13

## 2022-01-01 RX ADMIN — GABAPENTIN 400 MILLIGRAM(S): 400 CAPSULE ORAL at 14:39

## 2022-01-01 RX ADMIN — LACTULOSE 20 GRAM(S): 10 SOLUTION ORAL at 23:53

## 2022-01-01 RX ADMIN — CEFTRIAXONE 100 MILLIGRAM(S): 500 INJECTION, POWDER, FOR SOLUTION INTRAMUSCULAR; INTRAVENOUS at 06:14

## 2022-01-01 RX ADMIN — Medication 4 PUFF(S): at 21:03

## 2022-01-01 RX ADMIN — Medication 81 MILLIGRAM(S): at 13:09

## 2022-01-01 RX ADMIN — CHLORHEXIDINE GLUCONATE 1 APPLICATION(S): 213 SOLUTION TOPICAL at 05:26

## 2022-01-01 RX ADMIN — Medication 400 MILLIGRAM(S): at 17:22

## 2022-01-01 RX ADMIN — PANTOPRAZOLE SODIUM 40 MILLIGRAM(S): 20 TABLET, DELAYED RELEASE ORAL at 05:48

## 2022-01-01 RX ADMIN — GABAPENTIN 400 MILLIGRAM(S): 400 CAPSULE ORAL at 05:46

## 2022-01-01 RX ADMIN — Medication 650 MILLIGRAM(S): at 22:13

## 2022-01-01 RX ADMIN — CEFTRIAXONE 100 MILLIGRAM(S): 500 INJECTION, POWDER, FOR SOLUTION INTRAMUSCULAR; INTRAVENOUS at 21:13

## 2022-01-01 RX ADMIN — Medication 650 MILLIGRAM(S): at 16:13

## 2022-01-01 RX ADMIN — APIXABAN 5 MILLIGRAM(S): 2.5 TABLET, FILM COATED ORAL at 07:07

## 2022-01-01 RX ADMIN — Medication 75 MILLIGRAM(S): at 06:58

## 2022-01-01 RX ADMIN — CEFTRIAXONE 100 MILLIGRAM(S): 500 INJECTION, POWDER, FOR SOLUTION INTRAMUSCULAR; INTRAVENOUS at 22:01

## 2022-01-01 RX ADMIN — Medication 75 MILLIGRAM(S): at 05:28

## 2022-01-01 RX ADMIN — CEFTRIAXONE 100 MILLIGRAM(S): 500 INJECTION, POWDER, FOR SOLUTION INTRAMUSCULAR; INTRAVENOUS at 17:46

## 2022-01-01 RX ADMIN — Medication 0.5 MILLIGRAM(S): at 12:43

## 2022-01-01 RX ADMIN — POLYETHYLENE GLYCOL 3350 17 GRAM(S): 17 POWDER, FOR SOLUTION ORAL at 18:10

## 2022-01-01 RX ADMIN — HEPARIN SODIUM 5000 UNIT(S): 5000 INJECTION INTRAVENOUS; SUBCUTANEOUS at 03:32

## 2022-01-01 RX ADMIN — MONTELUKAST 10 MILLIGRAM(S): 4 TABLET, CHEWABLE ORAL at 11:46

## 2022-01-01 RX ADMIN — LAMOTRIGINE 100 MILLIGRAM(S): 25 TABLET, ORALLY DISINTEGRATING ORAL at 21:33

## 2022-01-01 RX ADMIN — ATORVASTATIN CALCIUM 20 MILLIGRAM(S): 80 TABLET, FILM COATED ORAL at 21:54

## 2022-01-01 RX ADMIN — Medication 650 MILLIGRAM(S): at 05:26

## 2022-01-01 RX ADMIN — Medication 650 MILLIGRAM(S): at 23:05

## 2022-01-01 RX ADMIN — MORPHINE SULFATE 1 MILLIGRAM(S): 50 CAPSULE, EXTENDED RELEASE ORAL at 14:19

## 2022-01-01 RX ADMIN — GABAPENTIN 400 MILLIGRAM(S): 400 CAPSULE ORAL at 21:41

## 2022-01-01 RX ADMIN — ALBUTEROL 4 PUFF(S): 90 AEROSOL, METERED ORAL at 15:40

## 2022-01-01 RX ADMIN — Medication 650 MILLIGRAM(S): at 11:40

## 2022-01-01 RX ADMIN — MORPHINE SULFATE 2 MILLIGRAM(S): 50 CAPSULE, EXTENDED RELEASE ORAL at 06:35

## 2022-01-01 RX ADMIN — MORPHINE SULFATE 2 MILLIGRAM(S): 50 CAPSULE, EXTENDED RELEASE ORAL at 06:13

## 2022-01-01 RX ADMIN — Medication 0.5 MILLIGRAM(S): at 21:24

## 2022-01-01 RX ADMIN — PANTOPRAZOLE SODIUM 40 MILLIGRAM(S): 20 TABLET, DELAYED RELEASE ORAL at 12:40

## 2022-01-01 RX ADMIN — METHOCARBAMOL 500 MILLIGRAM(S): 500 TABLET, FILM COATED ORAL at 18:17

## 2022-01-01 RX ADMIN — QUETIAPINE FUMARATE 250 MILLIGRAM(S): 200 TABLET, FILM COATED ORAL at 22:10

## 2022-01-01 RX ADMIN — FENTANYL CITRATE 4.54 MICROGRAM(S)/KG/HR: 50 INJECTION INTRAVENOUS at 08:38

## 2022-01-01 RX ADMIN — MONTELUKAST 10 MILLIGRAM(S): 4 TABLET, CHEWABLE ORAL at 12:23

## 2022-01-01 RX ADMIN — MORPHINE SULFATE 2 MILLIGRAM(S): 50 CAPSULE, EXTENDED RELEASE ORAL at 23:48

## 2022-01-01 RX ADMIN — Medication 75 MILLIGRAM(S): at 05:07

## 2022-01-01 RX ADMIN — LAMOTRIGINE 100 MILLIGRAM(S): 25 TABLET, ORALLY DISINTEGRATING ORAL at 18:01

## 2022-01-01 RX ADMIN — Medication 0.5 MILLIGRAM(S): at 17:49

## 2022-01-01 RX ADMIN — LAMOTRIGINE 100 MILLIGRAM(S): 25 TABLET, ORALLY DISINTEGRATING ORAL at 05:43

## 2022-01-01 RX ADMIN — LAMOTRIGINE 100 MILLIGRAM(S): 25 TABLET, ORALLY DISINTEGRATING ORAL at 17:07

## 2022-01-01 RX ADMIN — GABAPENTIN 400 MILLIGRAM(S): 400 CAPSULE ORAL at 14:31

## 2022-01-01 RX ADMIN — Medication 40 MILLIGRAM(S): at 05:28

## 2022-01-01 RX ADMIN — APIXABAN 5 MILLIGRAM(S): 2.5 TABLET, FILM COATED ORAL at 17:16

## 2022-01-01 RX ADMIN — Medication 40 MILLIGRAM(S): at 06:17

## 2022-01-01 RX ADMIN — Medication 650 MILLIGRAM(S): at 17:39

## 2022-01-01 RX ADMIN — ONDANSETRON 8 MILLIGRAM(S): 8 TABLET, FILM COATED ORAL at 21:30

## 2022-01-01 RX ADMIN — POLYETHYLENE GLYCOL 3350 17 GRAM(S): 17 POWDER, FOR SOLUTION ORAL at 05:46

## 2022-01-01 RX ADMIN — CHLORHEXIDINE GLUCONATE 1 APPLICATION(S): 213 SOLUTION TOPICAL at 05:32

## 2022-01-01 RX ADMIN — LORATADINE 10 MILLIGRAM(S): 10 TABLET ORAL at 12:25

## 2022-01-01 RX ADMIN — GABAPENTIN 300 MILLIGRAM(S): 400 CAPSULE ORAL at 15:13

## 2022-01-01 RX ADMIN — Medication 650 MILLIGRAM(S): at 12:16

## 2022-01-01 RX ADMIN — Medication 8.5 MICROGRAM(S)/KG/MIN: at 01:21

## 2022-01-01 RX ADMIN — Medication 15 MILLIGRAM(S): at 21:31

## 2022-01-01 RX ADMIN — BUDESONIDE AND FORMOTEROL FUMARATE DIHYDRATE 2 PUFF(S): 160; 4.5 AEROSOL RESPIRATORY (INHALATION) at 21:00

## 2022-01-01 RX ADMIN — MORPHINE SULFATE 2 MILLIGRAM(S): 50 CAPSULE, EXTENDED RELEASE ORAL at 22:14

## 2022-01-01 RX ADMIN — Medication 650 MILLIGRAM(S): at 16:00

## 2022-01-01 RX ADMIN — ATORVASTATIN CALCIUM 20 MILLIGRAM(S): 80 TABLET, FILM COATED ORAL at 21:07

## 2022-01-01 RX ADMIN — LAMOTRIGINE 100 MILLIGRAM(S): 25 TABLET, ORALLY DISINTEGRATING ORAL at 18:12

## 2022-01-01 RX ADMIN — LIDOCAINE 1 PATCH: 4 CREAM TOPICAL at 16:14

## 2022-01-01 RX ADMIN — SODIUM CHLORIDE 75 MILLILITER(S): 9 INJECTION INTRAMUSCULAR; INTRAVENOUS; SUBCUTANEOUS at 14:21

## 2022-01-01 RX ADMIN — Medication 650 MILLIGRAM(S): at 05:45

## 2022-01-01 RX ADMIN — LAMOTRIGINE 100 MILLIGRAM(S): 25 TABLET, ORALLY DISINTEGRATING ORAL at 05:07

## 2022-01-01 RX ADMIN — MORPHINE SULFATE 1 MILLIGRAM(S): 50 CAPSULE, EXTENDED RELEASE ORAL at 12:36

## 2022-01-01 RX ADMIN — APIXABAN 5 MILLIGRAM(S): 2.5 TABLET, FILM COATED ORAL at 17:07

## 2022-01-01 RX ADMIN — Medication 81 MILLIGRAM(S): at 12:20

## 2022-01-01 RX ADMIN — GABAPENTIN 400 MILLIGRAM(S): 400 CAPSULE ORAL at 05:28

## 2022-01-01 RX ADMIN — LAMOTRIGINE 100 MILLIGRAM(S): 25 TABLET, ORALLY DISINTEGRATING ORAL at 06:57

## 2022-01-01 RX ADMIN — ATORVASTATIN CALCIUM 20 MILLIGRAM(S): 80 TABLET, FILM COATED ORAL at 21:35

## 2022-01-01 RX ADMIN — AMLODIPINE BESYLATE 5 MILLIGRAM(S): 2.5 TABLET ORAL at 05:06

## 2022-01-01 RX ADMIN — CEFTRIAXONE 100 MILLIGRAM(S): 500 INJECTION, POWDER, FOR SOLUTION INTRAMUSCULAR; INTRAVENOUS at 17:31

## 2022-01-01 RX ADMIN — CHLORHEXIDINE GLUCONATE 15 MILLILITER(S): 213 SOLUTION TOPICAL at 05:21

## 2022-01-01 RX ADMIN — ALBUTEROL 4 PUFF(S): 90 AEROSOL, METERED ORAL at 13:19

## 2022-01-01 RX ADMIN — Medication 1 TABLET(S): at 15:14

## 2022-01-01 RX ADMIN — Medication 650 MILLIGRAM(S): at 18:55

## 2022-01-01 RX ADMIN — Medication 75 MILLIGRAM(S): at 06:17

## 2022-01-01 RX ADMIN — Medication 75 MILLIGRAM(S): at 05:47

## 2022-01-01 RX ADMIN — Medication 100 MILLIGRAM(S): at 13:57

## 2022-01-01 RX ADMIN — QUETIAPINE FUMARATE 50 MILLIGRAM(S): 200 TABLET, FILM COATED ORAL at 21:13

## 2022-01-01 RX ADMIN — Medication 40 MILLIGRAM(S): at 05:13

## 2022-01-01 RX ADMIN — Medication 3 MILLILITER(S): at 14:02

## 2022-01-01 RX ADMIN — Medication 4 PUFF(S): at 05:32

## 2022-01-01 RX ADMIN — ATORVASTATIN CALCIUM 20 MILLIGRAM(S): 80 TABLET, FILM COATED ORAL at 21:34

## 2022-01-01 RX ADMIN — GABAPENTIN 400 MILLIGRAM(S): 400 CAPSULE ORAL at 21:46

## 2022-01-01 RX ADMIN — POLYETHYLENE GLYCOL 3350 17 GRAM(S): 17 POWDER, FOR SOLUTION ORAL at 12:11

## 2022-01-01 RX ADMIN — MONTELUKAST 10 MILLIGRAM(S): 4 TABLET, CHEWABLE ORAL at 14:06

## 2022-01-01 RX ADMIN — MORPHINE SULFATE 2 MILLIGRAM(S): 50 CAPSULE, EXTENDED RELEASE ORAL at 02:03

## 2022-01-01 RX ADMIN — Medication 0.5 MILLIGRAM(S): at 11:52

## 2022-01-01 RX ADMIN — METHOCARBAMOL 500 MILLIGRAM(S): 500 TABLET, FILM COATED ORAL at 06:08

## 2022-01-01 RX ADMIN — SODIUM CHLORIDE 3 MILLILITER(S): 9 INJECTION INTRAMUSCULAR; INTRAVENOUS; SUBCUTANEOUS at 13:48

## 2022-01-01 RX ADMIN — PANTOPRAZOLE SODIUM 40 MILLIGRAM(S): 20 TABLET, DELAYED RELEASE ORAL at 11:38

## 2022-01-01 RX ADMIN — CHLORHEXIDINE GLUCONATE 1 APPLICATION(S): 213 SOLUTION TOPICAL at 05:22

## 2022-01-01 RX ADMIN — MONTELUKAST 10 MILLIGRAM(S): 4 TABLET, CHEWABLE ORAL at 11:50

## 2022-01-01 RX ADMIN — SODIUM CHLORIDE 3 MILLILITER(S): 9 INJECTION INTRAMUSCULAR; INTRAVENOUS; SUBCUTANEOUS at 07:05

## 2022-01-01 RX ADMIN — Medication 75 MILLIGRAM(S): at 17:08

## 2022-01-01 RX ADMIN — GABAPENTIN 300 MILLIGRAM(S): 400 CAPSULE ORAL at 21:26

## 2022-01-01 RX ADMIN — QUETIAPINE FUMARATE 200 MILLIGRAM(S): 200 TABLET, FILM COATED ORAL at 21:26

## 2022-01-01 RX ADMIN — Medication 75 MILLIGRAM(S): at 17:25

## 2022-01-01 RX ADMIN — Medication 81 MILLIGRAM(S): at 14:06

## 2022-01-01 RX ADMIN — APIXABAN 5 MILLIGRAM(S): 2.5 TABLET, FILM COATED ORAL at 06:07

## 2022-01-01 RX ADMIN — Medication 40 MILLIGRAM(S): at 05:31

## 2022-01-01 RX ADMIN — HEPARIN SODIUM 5000 UNIT(S): 5000 INJECTION INTRAVENOUS; SUBCUTANEOUS at 13:24

## 2022-01-01 RX ADMIN — POLYETHYLENE GLYCOL 3350 17 GRAM(S): 17 POWDER, FOR SOLUTION ORAL at 11:51

## 2022-01-01 RX ADMIN — Medication 650 MILLIGRAM(S): at 16:45

## 2022-01-01 RX ADMIN — LAMOTRIGINE 100 MILLIGRAM(S): 25 TABLET, ORALLY DISINTEGRATING ORAL at 05:13

## 2022-01-01 RX ADMIN — SODIUM CHLORIDE 3 MILLILITER(S): 9 INJECTION INTRAMUSCULAR; INTRAVENOUS; SUBCUTANEOUS at 05:42

## 2022-01-01 RX ADMIN — Medication 650 MILLIGRAM(S): at 00:56

## 2022-01-01 RX ADMIN — CHLORHEXIDINE GLUCONATE 15 MILLILITER(S): 213 SOLUTION TOPICAL at 18:10

## 2022-01-01 RX ADMIN — CHLORHEXIDINE GLUCONATE 15 MILLILITER(S): 213 SOLUTION TOPICAL at 05:34

## 2022-01-01 RX ADMIN — FENTANYL CITRATE 25 MICROGRAM(S): 50 INJECTION INTRAVENOUS at 00:54

## 2022-01-01 RX ADMIN — LAMOTRIGINE 100 MILLIGRAM(S): 25 TABLET, ORALLY DISINTEGRATING ORAL at 17:25

## 2022-01-01 RX ADMIN — Medication 650 MILLIGRAM(S): at 18:11

## 2022-03-03 NOTE — DISCUSSION/SUMMARY
[FreeTextEntry1] : \par History htn copd emphysema inga sees now Alexander.  . In hosp 11/18 hypotesion.. Was on verapramil Now off. Use to smoke. LVH EF 55%. Told need 2 gm na diet. Now edema resolved.  Had TIA. On home O2. She 12/20 covid pneumonia . She was on vent. Had neg stress in [place] 10/20. L carotid done by maddie 12/5/20.  She gained 1 pound. Told resume  weight loss. ? pulmonary rehab.. Memory better.  Told try walk.  Mass neck. Told benign. To not remove per ENT now\par Admitted 10/21 sepsis uti pneumonia. Cath 10/21 no significant cad. Patient sob off o2.\par  Ef 55- 60%. She had 3 covid vaccines. She has afib on AC. If off o2 sat as llow 76%. To get new c-pap. Told wear o2

## 2022-03-03 NOTE — HISTORY OF PRESENT ILLNESS
[FreeTextEntry1] : Symptoms:  dyspnea on exertion and fatigue, but no chest pain and no edema. Mass neck. Told benign. To not remove per ENT. On now 24 hour o2\par Admitted 10/21 sepsis uti pneumonia. Cath 10/21 no significant cad. If off o2 sat as llow 76%. Patient tired. Not dream\par \par \par \par \par \par \par \par

## 2022-03-10 NOTE — ASSESSMENT
[FreeTextEntry1] : A:\par COPD  \par COVID pneumonia\par hypoxia\par plan:\par Stress compliance with inhalers. Renewed as needed.\par cont PRN albuterol\par cont ICS/LABA\par O2 24/7 the patient can take the oxygen off while relaxing at rest.  She needs to continue it during exercise and sleep\par weight loss\par \par \par A:\par FLAVIO\par Obesity\par \par PLAN:\par The patient is benefitting from the PAP device .\par New supplies ordered \par Weight loss discussed\par I stressed the need maintain compliance  with the PAP device.\par The patient is not to use an Ozone or UV sterilizer. \par \par The patient's PAP unit is  under recall. The patient has registered the PAP device.\par I discussed at length with the pt the pros and cons to continuing PAP device given the health risks compared to stopping the device. There can be extreme risks to stopping the PAP.\par The patient is not to use an Ozone or UV sterilizer.\par The pt will call the insurance then vendor to see next steps in obtaining a replacement PAP device.\par I will forward a Rx to assist with the transition to new PAP device.\par I discussed the proposed solutions from Mikel regarding the CPAP replacement.\par \par \par She needs aggressive care by cardiology.\par I do not think she is ready for rehab at this point we will see her again 6 months for follow-up\par \par \par \par

## 2022-03-10 NOTE — REASON FOR VISIT
[Follow-Up] : a follow-up visit [COPD] : COPD [Pulmonary Fibrosis] : pulmonary fibrosis [TextBox_44] : Patient with history of sleep apnea and COPD.  Recent Covid infection.  Slowly improving but states she feels shortness of breath.  She tries to increase her oxygen intake but this does not help.

## 2022-04-12 NOTE — PATIENT PROFILE ADULT - FAMILY NEEDS
"Sherita Marshalljohnny Giron was seen and treated in our emergency department on 4/12/2022.  She may return to school on 04/14/2022.      If you have any questions or concerns, please don't hesitate to call.      Selene Dooley NP"
"Sherita"Roosevelt Giron was seen and treated in our emergency department on 4/12/2022.     COVID-19 is present in our communities across the state. There is limited testing for COVID at this time, so not all patients can be tested. In this situation, your employee meets the following criteria:    Sherita Giron has met the criteria for COVID-19 testing and has a NEGATIVE result. The employee can return to work once they are asymptomatic for 24 hours without the use of fever reducing medications (Tylenol, Motrin, etc).     If the employee is not fully vaccinated and had a close contact:  · Retest at 5 to 7 days post-exposure  · If possible, it is recommended that they quarantine for 5 days from the time of contact regardless of their test status.  · A mask should be worn post quarantine for 5 days.  If you have any questions or concerns, or if I can be of further assistance, please do not hesitate to contact me.    Sincerely,              RN"
no

## 2022-05-18 NOTE — H&P ADULT - ASSESSMENT
76 y/o female admitted with Acute UTI 78 y/o female admitted with Acute UTI    #Cystitis vs pyelonephritis  #no JOYCE  #CKD3a  - CTAP:  No hydronephrosis or renal calculus. Small renal cysts.  - UA: LE+ nitrite (-), pyuria and bacteria  - Ceftriaxone  - f/u Ucx and Bcx  - US kidney and bladder: wnl  - Renal consult  - tylenol prn fever  - zofran prn n/v    #RT calf tenderness and swelling  - Rt LE duplex    #appendocoliths  - npo for possible surgical intervention for appendicitis  - f/u surgery     #HTN  - c/w norvasc    #h/o of multiple TIA  #h/o of afib  - c/w metoprolol and Eliquis and statin    #COPD  #FLAVIO  - continue Albuterol MDI, Singular, Symbicort, Tiotropium  - c/w cpap    #depression  continue Seroquel    #HLD  - continue Statin    #OA  - tylenol prn pain 1-3  - morphine prn pain 7-10    #proph  - vte: eliquis

## 2022-05-18 NOTE — CONSULT NOTE ADULT - ASSESSMENT
78 yo female with Afib on Eliquis and COPD on home 02, CHF, FLAVIO, TIA, carotid stenosis s/p angioplasty  with appendiceal fecolith found on CT. Pts symptoms most likely related to vaccine.        Plan:  -  78 yo female with Afib on Eliquis and COPD on home 02, CHF, FLAVIO, TIA, carotid stenosis s/p angioplasty  with appendiceal fecolith found on CT. Pts symptoms most likely related to vaccine.        Plan:  - will monitor for 24 hours to ensure no perforation from large fecolith (unable to speak with radiologist regarding size)  - IVF resuscitation  - will follow      All above D/W Dr Recinos

## 2022-05-18 NOTE — H&P ADULT - HISTORY OF PRESENT ILLNESS
78 y/o female PMH: COPD,HTN, HLD, Depression, TIA x 3, Anxiety and recurrent UTI'S. As per  patient was on  the toilet last night and had difficulty getting up and walking. She was awake and responsive. Attempted to use her walker to get to couch but she was very weak and it  took a long time.  - Temp at home 101F, + chills.   - Acute onset for all above  -Reports recurrent UTI's, with present burning on urination and lower abdominal pain  -Patient had her 4th Covid Booster vaccine on Tuesday 5/17/2022                                      78 y/o female PMH: COPD,HTN, HLD, Depression, TIA x 3, Anxiety and recurrent UTI'S. As per  patient was on  the toilet last night and had difficulty getting up and walking. She was awake and responsive. Attempted to use her walker to get to couch but she was very weak and it  took a long time.  - Temp at home 101F, + chills.   - Acute onset for all above  -Reports recurrent UTI's, with present burning on urination and lower abdominal pain  -Patient had her 4th Covid Booster vaccine on Tuesday 5/17/2022    Seen by Surgical Team for evaluation of Appendiceal fecolith seen on CT Scan

## 2022-05-18 NOTE — ED ADULT NURSE NOTE - NSFALLRSKASSISTTYPE_ED_ALL_ED
Medical Nutrition Therapy        Assessment    Visit Type: Initial visit    Chief complaint T2DM    HPI: 61year old male with PMH of T2DM, HTN, HLD, AIDEE, and obesity  Most recent HbA1c 7 8%  No BG log or meter to review today  Leonard Alvarez reports that he is not following a special diet currently  He reports that he had been exercising had up until this past November when back pain got too bad and he stopped  Current physical activity includes ADLs and yard work  Carlos diet history reveals excess carbohydrate intake, excess sodium and saturated fat intake, and inadequate intake of vegetables and whole grains  Currently meals range from 85 to 100 grams of carbohydrate  Explained basic pathophysiology of diabetes and impact of diet on blood glucose levels  Together we discussed what foods contain CHO, reading a food label, timing of meals and snacks, serving sizes, the role of fiber in glycemic control, and how consistent carbohydrate intake can help achieve and maintain target BG levels  Used the portion booklet to teach Leonard Alvarez more about food groups and basic carbohydrate counting  Created an individualized meal plan for Leonard Alvarez with 3 meals and 2 snacks providing 60 g carb per meal and 15 g carb per snack  Recommended 10 servings (oz) of protein and 5 servings of dietary fats per day with emphasis placed on lean proteins and monounsaturated and polyunsaturated fats rather than saturated fat  This plan will help promote weight loss  Put together sample meals for Mahendra's reference  Leonard Alvarez demonstrated good understanding and will call with any questions prior to follow-up appointment in 6 weeks  Ht Readings from Last 1 Encounters:   03/18/21 6' 3" (1 905 m)     Wt Readings from Last 2 Encounters:   03/18/21 127 kg (281 lb)   11/12/20 121 kg (266 lb)     Weight Change: Yes gained 10 lbs over last 3 months      Medical Diagnosis/ICD10: E11 9 (ICD-10-CM) - Type 2 diabetes mellitus without complication, without long-term current use of insulin    Barriers to Learning: no barriers    Do you follow any special diet presently?: No  Who shops: patient  Who cooks: spouse    Food Log: Completed via the method of food recall    Breakfast:wakes 6 am  Eats around 7 am  2 eggs with cheddar cheese and a bowl of cereal (1 5 cups honey nut cheerios with 1 5 cups 1% milk) with fruit (3-4 strawberries, 1/3 cup blueberries, and banana)  Morning Snack: time varies sometimes but not usually, could be peanuts or mixed nuts  Lunch:12 pm  Bowl of campbells soup 10 oz (iliana jumbalaya) and a peanut butter and jelly sandwich on potato bread and a glass for milk with 1% milk (10 oz)  Afternoon Snack: usually just nuts (almonds or mixed or candy sometimes - could be anywhere 3-10 pieces of chocolate or bullseyes or jelly beans)  Dinner:6:30-7 pm  2 egg sandwiches (1 egg per snadwich), butter on the bread with american cheese and white bread/potato bread  16 oz glass 1% milk  Evening Snack:8-8:30 pm sometimes more candy or ice cream (2 cups) or sometimes might have 1 5 cup honey nut cheerios right after dinner  Beverages: water, 1% milk, occasional regular Dr Ann Alston (2-3 times per week)  Eating out/Take out:once per week, usually a restaurant and not fast food  Exercise had been up through November but back pain got too bad and stopped  ADL's and yard work      Calorie needs 2,080 kcals/day Carbs: 60 g/meal, 15 g/snack         Nutrition Diagnosis:  Excess carbohydrate intake  related to Food and nutrition related knowledge deficit concerning appropriate amount of carbohydrate intake as  evidenced by Estimated carbohydrate intake that is consistently more than recommended amounts    Intervention: plate method, reduced fat intake, increased fiber intake, label reading, carbohydrate counting, increased plant based foods, meal planning, individualized meal plan, monitoring portion control and food diary     Treatment Goals: Patient understands education and recommendations, Patient will monitor fat intake, Patient will monitor portion control, Patient will consume 25-35 grams of fiber a day, Patient will increase their intake of plant based foods and Patient will count carbohydrates    Monitoring and evaluation:    Term code indicator  FH 1 6 3 Carbohydrate Intake Criteria: 60 g of carbohydrate per meal, 15 g of carbohydrate per snack  Term code indicator  FH 4 4 Mealtime Behavior Criteria: 3 meals per day, 4-5 hours apart  2 snacks per day, at least 2 hours apart from meals  Patients Response to Instruction:  Goldie Chapman  Expected Compliancegood    Thank you for coming to the [facility] for education today  Please feel free to call with any questions or concerns  Start: 12:43 pm  Stop: 1:43 pm  Referred by: Tommi Pitcher Alejandra Soulier, PA-C Dianne Chen, [place] [address] [id number] Standing/Walking/Toileting

## 2022-05-18 NOTE — ED ADULT TRIAGE NOTE - CHIEF COMPLAINT QUOTE
states that he found pt awake but slumped over on the toilet and unable to get up. Pt was vomiting during the night and received the pfizer vaccine yesterday

## 2022-05-18 NOTE — H&P ADULT - NSHPSOCIALHISTORY_GEN_ALL_CORE
Tobacco use: x 30 yrs, 2 PPD, stopped 30 yrs ago  EtOH use: No  Illicit drug use: No  Marital Status:     Uses walker at times

## 2022-05-18 NOTE — H&P ADULT - NSHPPHYSICALEXAM_GEN_ALL_CORE
Vital Signs Last 24 Hrs    HR: 94 (05-18-22 @ 09:10) (94 - 94)  BP: 137/65 (05-18-22 @ 09:10)  RR: 20 (05-18-22 @ 09:10) (20 - 20)  SpO2: 94% (05-18-22 @ 09:10) (94% - 94%)    PHYSICAL EXAM:      Constitutional: NAD, A&O x3    Eyes: PERRLA    Respiratory: +air entry, no rales, no rhonchi, no wheezes    Cardiovascular: +S1 and S2, regular rate and rhythm    Gastrointestinal: +BS, soft, non-tender, not distended    Extremities:  ** Right calf girth greater then left, ** Tenderness knees bilaterally, no edema, ** calf tenderness bilaterally    Vascular: +dorsal pedis and radial pulses, no extremity cyanosis    Neurological: sensation intact, ROM equal B/L, CN II-XII intact    Skin: no rashes, normal turgor

## 2022-05-18 NOTE — CONSULT NOTE ADULT - ATTENDING COMMENTS
above noted discussed case with surgical PA abdomen soft no distension/tenderness ct scan noted labs noted will discuss csae with radiologist regarding size of fecalith

## 2022-05-18 NOTE — ED PROVIDER NOTE - CARE PLAN
Principal Discharge DX:	Weakness   1 Principal Discharge DX:	Weakness  Secondary Diagnosis:	Appendicolith  Secondary Diagnosis:	RLQ abdominal pain  Secondary Diagnosis:	Acute UTI

## 2022-05-18 NOTE — ED ADULT NURSE NOTE - NSIMPLEMENTINTERV_GEN_ALL_ED
Implemented All Fall Risk Interventions:  [place] to call system. Call bell, personal items and telephone within reach. Instruct patient to call for assistance. Room bathroom lighting operational. Non-slip footwear when patient is off stretcher. Physically safe environment: no spills, clutter or unnecessary equipment. Stretcher in lowest position, wheels locked, appropriate side rails in place. Provide visual cue, wrist band, yellow gown, etc. Monitor gait and stability. Monitor for mental status changes and reorient to person, place, and time. Review medications for side effects contributing to fall risk. Reinforce activity limits and safety measures with patient and family.

## 2022-05-18 NOTE — H&P ADULT - NSICDXPASTMEDICALHX_GEN_ALL_CORE_FT
PAST MEDICAL HISTORY:  2019 novel coronavirus disease (COVID-19)     Afib     Bipolar 1 disorder     Congestive heart failure     COPD (chronic obstructive pulmonary disease)     Depression     Falls     HLD (hyperlipidemia)     Hypertension     FLAVIO on CPAP     Other cardiomyopathy     Other emphysema     PVD (peripheral vascular disease)     Respiratory failure requiring intubation     Transient ischemic attack x 3

## 2022-05-18 NOTE — PATIENT PROFILE ADULT - DEAF OR HARD OF HEARING?
Former smoker (60 pack years, quit 1995), denies EtOH use. Now retired as an , lives in the [facility].
"I am feeling suicidal and want to be admitted"
no

## 2022-05-18 NOTE — H&P ADULT - NSHPLABSRESULTS_GEN_ALL_CORE
11.5   5.32  )-----------( 227      ( 18 May 2022 09:30 )             36.5           142  |  103  |  25<H>  ----------------------------<  122<H>  4.3   |  27  |  1.2    Ca    10.8<H>      18 May 2022 09:30    TPro  7.2  /  Alb  4.3  /  TBili  0.4  /  DBili  x   /  AST  30  /  ALT  14  /  AlkPhos  216<H>            Urinalysis Basic - ( 18 May 2022 13:30 )    Color: Yellow / Appearance: Clear / S.020 / pH: x  Gluc: x / Ketone: Negative  / Bili: Negative / Urobili: 0.2 mg/dL   Blood: x / Protein: Negative mg/dL / Nitrite: Negative   Leuk Esterase: Large / RBC: 6-10 /HPF / WBC 10-25 /HPF   Sq Epi: x / Non Sq Epi: Few /HPF / Bacteria: Many    PT/INR - ( 18 May 2022 09:30 )   PT: 18.00 sec;   INR: 1.57 ratio         PTT - ( 18 May 2022 09:30 )  PTT:26.1 sec    Lactate Trend   @ 09:30 Lactate:1.6       CARDIAC MARKERS ( 18 May 2022 09:30 )  x     / <0.01 ng/mL / 57 U/L        POCT Blood Glucose.: 124 mg/dL (18 May 2022 09:03)   CT Abdomen and Pelvis No Cont (22 @ 11:31) >      IMPRESSION:    No definite CT evidence for acute intra-abdominal pathology.    Areas of mild appendiceal dilatation secondary to internal appendicoliths   within an otherwise unremarkable appearing appendix with no evidence of   periappendiceal inflammation.       CT Head No Cont (22 @ 10:24) >      IMPRESSION:    No evidence of acute intracranial pathology. Stable exam.      < from: Xray Chest 1 View- PORTABLE-Urgent (22 @ 09:52) >      Impression:    Suggestion of hazy bilateral opacities. Follow-up recommended.

## 2022-05-18 NOTE — ED PROVIDER NOTE - OBJECTIVE STATEMENT
77-year-old female with past medical history atrial fibrillation on Eliquis, TIA x 3, HTN, HLD, COPD on 4 L nasal cannula, septic shock October 2021, NSTEMI, status post catheterization showing non-obstructive coronary artery disease, mild SFA disease presents to the ED with weakness. Patient received the her 4th COVID Vaccine yesterday and was in normal health. Last night however she started coughing and experienced multiple episodes of emesis. This morning patient continued to experience vomiting but felt very weak and fatigued.  noted that she could not get off of the toilet 2/2 weakness. 77-year-old female with past medical history atrial fibrillation on Eliquis, TIA x 3, HTN, HLD, COPD on 4 L nasal cannula, septic shock October 2021, NSTEMI, status post catheterization showing non-obstructive coronary artery disease, mild SFA disease presents to the ED with weakness. Patient received the her 4th COVID Vaccine yesterday and last night experienced 5 episodes of non bloody emesis. This morning patient continued to experience vomiting but felt very weak and fatigued.  noted that she could not get off of the toilet 2/2 weakness. She received her fourth booster shot yesterday.  Denies chest pain, shortness of breath, focal weakness or numbness, dizziness.

## 2022-05-18 NOTE — ED PROVIDER NOTE - ATTENDING APP SHARED VISIT CONTRIBUTION OF CARE
77-year-old female with past medical history atrial fibrillation on Eliquis, HTN, HLD, COPD on 4 L nasal cannula, septic shock October 2021, NSTEMI, status post catheterization showing non-obstructive coronary artery disease, mild SFA disease, ho presents to the ER for vomiting, nonbilious nonbloody 5 times last night and generalized weakness.  She endorses that she just "does not feel right".  She endorses lower abdominal pain and gradual onset diffuse headache.  Prior to vomiting she endorsed coughing, productive.  She received her fourth booster shot yesterday.  Denies chest pain, shortness of breath, focal weakness or numbness, dizziness.    Vs noted, triage note reviewed    Gen - supine in stretcher, eyes closed, in NAD, Head - NCAT, Eyes- PERRL, EOMI painless, no nystagmus, Pharynx - clear, MMM, Heart - RRR, no m/g/r, Lungs - CTAB, no w/c/r, Abdomen - soft, + ttp RLQ, ND, Skin - No rash, Extremities - FROM, no edema, erythema, ecchymosis, brisk cap refill, Neuro - A&O x3, equal strength and sensation, non-focal exam    a/p:  n/v, abd pain  generalized weakness  EKG, CXR, labs, ct a/p, HCT  IVF, zofran, reassess 77-year-old female with past medical history atrial fibrillation on Eliquis, HTN, HLD, COPD on 4 L nasal cannula, septic shock October 2021, NSTEMI, status post catheterization showing non-obstructive coronary artery disease, mild SFA disease, who presents to the ER for vomiting, nonbilious nonbloody 5 times last night and generalized weakness.  She endorses that she just "does not feel right" along with lower abdominal pain R>L and gradual onset diffuse headache.  Prior to vomiting she endorsed coughing, productive.  She received her fourth booster shot yesterday. Had no adverse reactions to prior vaccines. Denies chest pain, shortness of breath, focal weakness or numbness, dizziness.    Vs noted, triage note reviewed    Gen - supine in stretcher, eyes closed, in NAD, Head - NCAT, Eyes- PERRL, EOMI painless, no nystagmus, Pharynx - clear, MMM, Heart - RRR, no m/g/r, Lungs - CTAB, no w/c/r, Abdomen - soft, + ttp RLQ, ND, Skin - No rash, Extremities - FROM, no edema, erythema, ecchymosis, brisk cap refill, Neuro - A&O x3, equal strength and sensation, non-focal exam    a/p:  n/v, abd pain + RLQ ttp  generalized weakness  EKG, CXR, labs, ct a/p, HCT  IVF, zofran, reassess

## 2022-05-18 NOTE — ED PROVIDER NOTE - PHYSICAL EXAMINATION
Physical Exam    Constitutional: No acute distress.   Eyes: Conjunctiva pink, Sclera clear, PERRLA, EOMI.  ENT: No sinus tenderness. No nasal discharge. No oropharyngeal erythema, edema, or exudates. Uvula midline.   Cardiovascular: Regular rate, regular rhythm. No noted murmurs.   Respiratory: unlabored respiratory effort, no wheezing or rales.   Gastrointestinal: Normal bowel sounds. soft, non distended, diffusely tender without guarding or rebound.   Musculoskeletal: supple neck, no midline tenderness. No joint or bony deformity.   Integumentary: warm, dry, no rash  Neurologic: awake, alert, oriented although lethargic. Moving all extremities. CN intact.

## 2022-05-18 NOTE — ED PROVIDER NOTE - NS ED ROS FT
Constitutional: (-) fever (+) total body weakness  Eyes/ENT: (-) blurry vision, (-) epistaxis  Cardiovascular: (-) chest pain, (-) syncope  Respiratory: (+) cough, (+) shortness of breath  Gastrointestinal: (+) abdominal pain (+) vomiting, (-) diarrhea  Musculoskeletal: (-) neck pain, (-) back pain, (-) joint pain  Integumentary: (-) rash, (-) edema  Neurological: (-) headache, (-) altered mental status   Psychiatric: (-) hallucinations  Allergic/Immunologic: (-) pruritus

## 2022-05-18 NOTE — PATIENT PROFILE ADULT - FALL HARM RISK - RISK INTERVENTIONS

## 2022-05-18 NOTE — H&P ADULT - PROBLEM SELECTOR PLAN 1
IV Rocephine  follow urine and blood cultures  follow fever curve IV Rocephin  follow urine and blood cultures  follow fever curve

## 2022-05-18 NOTE — ED PROVIDER NOTE - NS ED ATTENDING STATEMENT MOD
This was a shared visit with the JOSSELYN. I reviewed and verified the documentation and independently performed the documented:

## 2022-05-18 NOTE — ED PROVIDER NOTE - CLINICAL SUMMARY MEDICAL DECISION MAKING FREE TEXT BOX
77-year-old female with past medical history atrial fibrillation on Eliquis, HTN, HLD, COPD on 4 L nasal cannula, septic shock October 2021, NSTEMI, status post catheterization showing non-obstructive coronary artery disease, mild SFA disease, who presents to the ER for vomiting, nonbilious nonbloody 5 times last night and generalized weakness. Pt with RLQ ttp, labs and imaging reviewed, remarkable for multiple large appendicoliths without evidence of acute appendicitis. Ct wo IV contrast given pt with anaphylaxis requiring epi in the past. On reassessment pt with improvement of sx, however persistent RLQ ttp. Surgery consults appreciated, pt given IVF, Rocephin for possible UTI, and admitted for further mgmt.

## 2022-05-18 NOTE — H&P ADULT - PROBLEM SELECTOR PLAN 11
seen by Surgical team for fecolith seen on CT Scan  Surgery recommends to keep NPO except meds and ice chips x 24 hrs

## 2022-05-18 NOTE — H&P ADULT - PROBLEM SELECTOR PLAN 12
IV hydration; N/S at 120ml/hr    No NSAIDS   Renal/Bladder US  Renal Consult Dr Moyer  Urine studies

## 2022-05-19 NOTE — CONSULT NOTE ADULT - NS ATTEND AMEND GEN_ALL_CORE FT
Patient very well known to me. No chest pain or sob. Patient cardiac status improved. If surgery needed cardiac risk intermediate. But pulmonary to see patient preop.

## 2022-05-19 NOTE — CHART NOTE - NSCHARTNOTEFT_GEN_A_CORE
Repeat NC CT of abd done. Went over both CT AP with Dr. Munoz, attending radiologist. Noted a 6x4mm appendicolith at the base and a 1.1 x 0.5cm appendicolith distally. Overall the appendix is dilated to 7-8mm. Discussed findings and clinical picture with Dr. Recinos of surgery. Agreed to do an appendectomy possibly on Saturday, 5/21. Cardio and pulm clearance needed given co-morbidities. Pt placed on full liquid diet and eliquis held. Given that the size of an appendicolith is larger than the appendix size itself, there is a high risk of perforation.
Repeat CT performed today    IMPRESSION:  1.  Unchanged mildly dilated appendix containing numerous appendicoliths.   No periappendiceal inflammation or fluid collection. No definite CT   evidence of acute appendicitis. Recommend correlation with symptoms and   white blood cell count.  2.  Stable exam in this short interval follow.    --- End of Report ---    WBC 5--3.9  Afebrile      D/W Dr Recinos - plan remains the same

## 2022-05-19 NOTE — PHYSICAL THERAPY INITIAL EVALUATION ADULT - PERTINENT HX OF CURRENT PROBLEM, REHAB EVAL
76 y/o female PMH: COPD,HTN, HLD, Depression, TIA x 3, Anxiety and recurrent UTI'S. As per  patient was on  the toilet last night and had difficulty getting up and walking. She was awake and responsive. Attempted to use her walker to get to couch but she was very weak and it  took a long time.

## 2022-05-19 NOTE — CONSULT NOTE ADULT - ASSESSMENT
76 y/o female PMH: COPD, HTN, HLD, Depression, TIA x 3, Anxiety and recurrent UTI'S, was admitted for evaluation of weakness and acute UTI. Was found to have multipple appendicoliths on CT scan. Cardiovascular Risk Stratification for possible appendectomy      #ECG: NS, non ischemic  #Cxr: Hazy B/L opacities   #Pharm Nuclear Stress(12/2020): Negative for Ischemia  #ECHO(10/2021): EF 55-60%, Moderate PHTN  #pBNP 2379. On 10/2021 pBNP 36305      - Currently no active cardiac conditions. No signs of ischemia, or ADHF, and clinical exam not consistent with stenotic valvular disease  - METS<4  - Revised Cardiac Risk Index: 2(Class III risk) 10.1% risk of SARA  - Huggins Perioperative score: 1.2% risk of SARA  - CHADS-VASc2: 6 - C/w Eliquis. Can hold Eliquis 12hrs before. Resume when possible  - Pt is at intermediate risk for cardiovascular complications kate-op/post-op  - Further cardiac workup will depend on clinical course  - All other workup per primary team    Discussed with Dr Bishop     78 y/o female PMH: COPD, HTN, HLD, Depression, TIA x 3, Anxiety and recurrent UTI'S, was admitted for evaluation of weakness and acute UTI. Was found to have multipple appendicoliths on CT scan. Cardiovascular Risk Stratification for possible appendectomy      #ECG: NS, non ischemic  #Cxr: Hazy B/L opacities   #Pharm Nuclear Stress(12/2020): Negative for Ischemia  #ECHO(10/2021): EF 55-60%, Moderate PHTN  #pBNP 2379. On 10/2021 pBNP 19625      - Currently no active cardiac conditions. No signs of ischemia, or ADHF, and clinical exam not consistent with stenotic valvular disease  - METS<4  - Revised Cardiac Risk Index: 2(Class III risk) 10.1% risk of SARA  - Huggins Perioperative score: 1.2% risk of SARA  - CHADS-VASc2: 6 - C/w Eliquis. Can hold Eliquis 12hrs before. Resume when possible  - C/w Metoprolol 75mg PO BID for rate control  - Pt is at intermediate risk for cardiovascular complications kate-op/post-op  - Further cardiac workup will depend on clinical course  - All other workup per primary team    Discussed with Dr Bishop

## 2022-05-19 NOTE — PROGRESS NOTE ADULT - SUBJECTIVE AND OBJECTIVE BOX
Patient is a 77y old  Female who presents with a chief complaint of UTI (19 May 2022 13:38)      SUBJECTIVE / OVERNIGHT EVENTS:     Pt examined this am. She had RLQ pain this am. Pain is not constant. Her N/V has resolved and she has been afebrile outside of her initial temp. I spoke to her about her appendix and the possibility of an appendectomy which she is concerned about because her comorbidities put her at high risk. She states she has benign masses in her neck and is not getting surgery for that but I explained in this situation the benefit would be higher than the risk. I spoke to the radiologist at [facility], Dr. Huddleston, who is concerned for acute appendicitis given the appendiceal diltation is markedly enlarged from her appendix from a scan a few years ago and the appendicolith sizes are >5cm and could be obstructive distally. Recommended a repeat non contrast CT abdomen as her appendicitis could be evolving and would be better visualized now 24 hrs later. Clinically her picture represents acute appendicitis with RLQ pain, fever, and N/V. It would be ideal to operate IP vs OP given her high risk factors. Continue to keep npo until repeat CT is done in order to assess whether pt needs urgent surgery.     Nephro recs noted. F/u Ucx. She also states she has had 4 UTIs in the past year. She denies any change in meds, no poor oral intake, no new sexual partners, or long trips that could trigger changes in po intake/urine output. Given she has had >3 UTIs/per year, discussed the possibility of prophylactic antibiotics. She stated she would be open to it given how inconvenient her UTIs are and her bad her symptoms are. Will f/u Ucx and see if proph abx would be indicated. Lasix to be resumed upon d/c.     Received multiple calls regarding numbness and pain in pt's lower legs. Pt's RT leg more swollen than Lt leg so RLE duplex was done on ; awaiting read. Explained multiple times that her rom is limited because of pain and not a neurological deficit and likely mechanical 2/2 to sciatic n/peroneal n compression given her morbid obesity while sitting upright in the chair all morning. f/u duplex report.     ADDITIONAL REVIEW OF SYSTEMS:  as above    MEDICATIONS  (STANDING):  amLODIPine   Tablet 5 milliGRAM(s) Oral daily  apixaban 5 milliGRAM(s) Oral every 12 hours  aspirin  chewable 81 milliGRAM(s) Oral daily  atorvastatin 20 milliGRAM(s) Oral at bedtime  budesonide 160 MICROgram(s)/formoterol 4.5 MICROgram(s) Inhaler 2 Puff(s) Inhalation two times a day  cefTRIAXone   IVPB 1000 milliGRAM(s) IV Intermittent once  cefTRIAXone   IVPB 1000 milliGRAM(s) IV Intermittent every 24 hours  chlorhexidine 4% Liquid 1 Application(s) Topical <User Schedule>  gabapentin 300 milliGRAM(s) Oral three times a day  lamoTRIgine 100 milliGRAM(s) Oral two times a day  metoprolol tartrate 75 milliGRAM(s) Oral two times a day  montelukast 10 milliGRAM(s) Oral daily  multivitamin 1 Tablet(s) Oral daily  QUEtiapine 200 milliGRAM(s) Oral at bedtime  QUEtiapine 50 milliGRAM(s) Oral at bedtime  senna 2 Tablet(s) Oral at bedtime  sodium chloride 0.9%. 1000 milliLiter(s) (75 mL/Hr) IV Continuous <Continuous>  tiotropium 18 MICROgram(s) Capsule 1 Capsule(s) Inhalation daily    MEDICATIONS  (PRN):  acetaminophen     Tablet .. 650 milliGRAM(s) Oral every 8 hours PRN Mild Pain (1 - 3)  ALBUTerol    90 MICROgram(s) HFA Inhaler 1 Puff(s) Inhalation every 6 hours PRN Shortness of Breath  LORazepam     Tablet 0.5 milliGRAM(s) Oral two times a day PRN Anxiety      CAPILLARY BLOOD GLUCOSE        I&O's Summary      PHYSICAL EXAM:  Vital Signs Last 24 Hrs  T(C): 37.1 (19 May 2022 13:37), Max: 37.1 (19 May 2022 13:37)  T(F): 98.7 (19 May 2022 13:37), Max: 98.7 (19 May 2022 13:37)  HR: 75 (19 May 2022 13:37) (71 - 75)  BP: 180/77 (19 May 2022 13:37) (129/59 - 180/77)  BP(mean): --  RR: 16 (19 May 2022 13:37) (16 - 16)  SpO2: 95% (19 May 2022 05:41) (95% - 95%)  CONSTITUTIONAL: NAD, morbidly obese  EYES: PERRLA; conjunctiva and sclera clear  ENMT: Moist oral mucosa, no pharyngeal injection or exudates; normal dentition  NECK: thick neck circumference unable to examine JVD;   RESPIRATORY: Normal respiratory effort; lungs are clear to auscultation bilaterally  CARDIOVASCULAR: Regular rate and rhythm, normal S1 and S2, no murmur/rub/gallop; Rt lower extremity edema >LT Peripheral pulses are 2+ bilaterally  ABDOMEN: Nontender to palpation, normoactive bowel sounds, no rebound/guarding; No hepatosplenomegaly  MUSCULOSKELETAL: could not assess gait; no clubbing or cyanosis of digits; no joint swelling or tenderness to palpation  PSYCH: A+O to person, place, and time; affect appropriate      LABS:                        9.8    3.99  )-----------( 171      ( 19 May 2022 08:15 )             31.7         144  |  109  |  25<H>  ----------------------------<  82  3.8   |  25  |  1.2    Ca    8.5      19 May 2022 08:15    TPro  7.2  /  Alb  4.3  /  TBili  0.4  /  DBili  x   /  AST  30  /  ALT  14  /  AlkPhos  216<H>  18    PT/INR - ( 18 May 2022 09:30 )   PT: 18.00 sec;   INR: 1.57 ratio         PTT - ( 18 May 2022 09:30 )  PTT:26.1 sec  CARDIAC MARKERS ( 18 May 2022 09:30 )  x     / <0.01 ng/mL / 57 U/L / x     / x          Urinalysis Basic - ( 18 May 2022 13:30 )    Color: Yellow / Appearance: Clear / S.020 / pH: x  Gluc: x / Ketone: Negative  / Bili: Negative / Urobili: 0.2 mg/dL   Blood: x / Protein: Negative mg/dL / Nitrite: Negative   Leuk Esterase: Large / RBC: 6-10 /HPF / WBC 10-25 /HPF   Sq Epi: x / Non Sq Epi: Few /HPF / Bacteria: Many          RADIOLOGY & ADDITIONAL TESTS:  Results Reviewed:   Imaging Personally Reviewed:  Electrocardiogram Personally Reviewed:    COORDINATION OF CARE:  Care Discussed with Consultants/Other Providers [Y/N]:  Prior or Outpatient Records Reviewed [Y/N]:         Patient is a 77y old  Female who presents with a chief complaint of UTI (19 May 2022 13:38)      SUBJECTIVE / OVERNIGHT EVENTS:     Pt examined this am. She had RLQ pain this am. Pain is not constant. Her N/V has resolved and she has been afebrile outside of her initial temp. I spoke to her about her appendix and the possibility of an appendectomy which she is concerned about because her comorbidities put her at high risk. She states she has benign masses in her neck and is not getting surgery for that but I explained in this situation the risk of perforation resulting in sepsis/death is crucial to prevent, but only if the pt has true appendicitis requiring intervention. I spoke to the radiologist at [facility], Dr. Huddleston, who is concerned for acute appendicitis given the appendiceal dilatation is markedly enlarged from her appendix from a scan a few years ago and the appendicolith sizes are >5cm and could be obstructive distally. Recommended a repeat non contrast CT abdomen as her appendicitis could be evolving and would be better visualized now 24 hrs later. Clinically her picture represents acute appendicitis with RLQ pain, fever, and N/V. It would be ideal to operate IP vs OP given her high risk factors. Continue to keep npo until repeat CT is done in order to assess whether pt needs urgent surgery.     Nephro recs noted. F/u Ucx. She also states she has had 4 UTIs in the past year. She denies any change in meds, no poor oral intake, no new sexual partners, or long trips that could trigger changes in po intake/urine output. Given she has had >3 UTIs/per year, discussed the possibility of prophylactic antibiotics. She stated she would be open to it given how inconvenient her UTIs are and her bad her symptoms are. Will f/u Ucx and see if proph abx would be indicated. Lasix to be resumed upon d/c.     Received multiple calls regarding numbness and pain in pt's lower legs. Pt's RT leg more swollen than Lt leg so RLE duplex was done on ; awaiting read. Explained multiple times that her rom is limited because of pain and not a neurological deficit and likely mechanical 2/2 to sciatic n/peroneal n compression given her morbid obesity while sitting upright in the chair all morning. f/u duplex report.     ADDITIONAL REVIEW OF SYSTEMS:  as above    MEDICATIONS  (STANDING):  amLODIPine   Tablet 5 milliGRAM(s) Oral daily  apixaban 5 milliGRAM(s) Oral every 12 hours  aspirin  chewable 81 milliGRAM(s) Oral daily  atorvastatin 20 milliGRAM(s) Oral at bedtime  budesonide 160 MICROgram(s)/formoterol 4.5 MICROgram(s) Inhaler 2 Puff(s) Inhalation two times a day  cefTRIAXone   IVPB 1000 milliGRAM(s) IV Intermittent once  cefTRIAXone   IVPB 1000 milliGRAM(s) IV Intermittent every 24 hours  chlorhexidine 4% Liquid 1 Application(s) Topical <User Schedule>  gabapentin 300 milliGRAM(s) Oral three times a day  lamoTRIgine 100 milliGRAM(s) Oral two times a day  metoprolol tartrate 75 milliGRAM(s) Oral two times a day  montelukast 10 milliGRAM(s) Oral daily  multivitamin 1 Tablet(s) Oral daily  QUEtiapine 200 milliGRAM(s) Oral at bedtime  QUEtiapine 50 milliGRAM(s) Oral at bedtime  senna 2 Tablet(s) Oral at bedtime  sodium chloride 0.9%. 1000 milliLiter(s) (75 mL/Hr) IV Continuous <Continuous>  tiotropium 18 MICROgram(s) Capsule 1 Capsule(s) Inhalation daily    MEDICATIONS  (PRN):  acetaminophen     Tablet .. 650 milliGRAM(s) Oral every 8 hours PRN Mild Pain (1 - 3)  ALBUTerol    90 MICROgram(s) HFA Inhaler 1 Puff(s) Inhalation every 6 hours PRN Shortness of Breath  LORazepam     Tablet 0.5 milliGRAM(s) Oral two times a day PRN Anxiety      CAPILLARY BLOOD GLUCOSE        I&O's Summary      PHYSICAL EXAM:  Vital Signs Last 24 Hrs  T(C): 37.1 (19 May 2022 13:37), Max: 37.1 (19 May 2022 13:37)  T(F): 98.7 (19 May 2022 13:37), Max: 98.7 (19 May 2022 13:37)  HR: 75 (19 May 2022 13:37) (71 - 75)  BP: 180/77 (19 May 2022 13:37) (129/59 - 180/77)  BP(mean): --  RR: 16 (19 May 2022 13:37) (16 - 16)  SpO2: 95% (19 May 2022 05:41) (95% - 95%)  CONSTITUTIONAL: NAD, morbidly obese  EYES: PERRLA; conjunctiva and sclera clear  ENMT: Moist oral mucosa, no pharyngeal injection or exudates; normal dentition  NECK: thick neck circumference unable to examine JVD;   RESPIRATORY: Normal respiratory effort; lungs are clear to auscultation bilaterally  CARDIOVASCULAR: Regular rate and rhythm, normal S1 and S2, no murmur/rub/gallop; Rt lower extremity edema >LT Peripheral pulses are 2+ bilaterally  ABDOMEN: Nontender to palpation, normoactive bowel sounds, no rebound/guarding; No hepatosplenomegaly  MUSCULOSKELETAL: could not assess gait; no clubbing or cyanosis of digits; no joint swelling or tenderness to palpation  PSYCH: A+O to person, place, and time; affect appropriate      LABS:                        9.8    3.99  )-----------( 171      ( 19 May 2022 08:15 )             31.7     05-    144  |  109  |  25<H>  ----------------------------<  82  3.8   |  25  |  1.2    Ca    8.5      19 May 2022 08:15    TPro  7.2  /  Alb  4.3  /  TBili  0.4  /  DBili  x   /  AST  30  /  ALT  14  /  AlkPhos  216<H>  05-18    PT/INR - ( 18 May 2022 09:30 )   PT: 18.00 sec;   INR: 1.57 ratio         PTT - ( 18 May 2022 09:30 )  PTT:26.1 sec  CARDIAC MARKERS ( 18 May 2022 09:30 )  x     / <0.01 ng/mL / 57 U/L / x     / x          Urinalysis Basic - ( 18 May 2022 13:30 )    Color: Yellow / Appearance: Clear / S.020 / pH: x  Gluc: x / Ketone: Negative  / Bili: Negative / Urobili: 0.2 mg/dL   Blood: x / Protein: Negative mg/dL / Nitrite: Negative   Leuk Esterase: Large / RBC: 6-10 /HPF / WBC 10-25 /HPF   Sq Epi: x / Non Sq Epi: Few /HPF / Bacteria: Many          RADIOLOGY & ADDITIONAL TESTS:  Results Reviewed:   Imaging Personally Reviewed:  Electrocardiogram Personally Reviewed:    COORDINATION OF CARE:  Care Discussed with Consultants/Other Providers [Y/N]:  Prior or Outpatient Records Reviewed [Y/N]:

## 2022-05-19 NOTE — PROGRESS NOTE ADULT - SUBJECTIVE AND OBJECTIVE BOX
Subjective: 78 yo female admitted for weakness and found to have a appendiceal fecolith on CT. Pt states she has no abdominal pain except briefly after urinating this AM. Denies n/v, f/c. Pt is asking to eat.         Vital Signs Last 24 Hrs  T(C): 37.1 (19 May 2022 13:37), Max: 37.1 (19 May 2022 13:37)  T(F): 98.7 (19 May 2022 13:37), Max: 98.7 (19 May 2022 13:37)  HR: 75 (19 May 2022 13:37) (71 - 75)  BP: 180/77 (19 May 2022 13:37) (129/59 - 180/77)  BP(mean): --  RR: 16 (19 May 2022 13:37) (16 - 16)  SpO2: 95% (19 May 2022 05:41) (95% - 95%)    General Appearance: Appears well, NAD  Neck: Supple  Chest: Equal expansion bilaterally, equal breath sounds  CV: Pulse regular presently  Abdomen: Soft, mild tenderness across lower abdomen in SP area, ND,+bs,  no rebound/gaurding  Extremities: Grossly symmetric, no calf tend b/l        I&O's Summary    I&O's Detail      MEDICATIONS  (STANDING):  amLODIPine   Tablet 5 milliGRAM(s) Oral daily  apixaban 5 milliGRAM(s) Oral every 12 hours  aspirin  chewable 81 milliGRAM(s) Oral daily  atorvastatin 20 milliGRAM(s) Oral at bedtime  budesonide 160 MICROgram(s)/formoterol 4.5 MICROgram(s) Inhaler 2 Puff(s) Inhalation two times a day  cefTRIAXone   IVPB 1000 milliGRAM(s) IV Intermittent once  cefTRIAXone   IVPB 1000 milliGRAM(s) IV Intermittent every 24 hours  chlorhexidine 4% Liquid 1 Application(s) Topical <User Schedule>  gabapentin 300 milliGRAM(s) Oral three times a day  lamoTRIgine 100 milliGRAM(s) Oral two times a day  metoprolol tartrate 75 milliGRAM(s) Oral two times a day  montelukast 10 milliGRAM(s) Oral daily  multivitamin 1 Tablet(s) Oral daily  QUEtiapine 200 milliGRAM(s) Oral at bedtime  QUEtiapine 50 milliGRAM(s) Oral at bedtime  senna 2 Tablet(s) Oral at bedtime  sodium chloride 0.9%. 1000 milliLiter(s) (75 mL/Hr) IV Continuous <Continuous>  tiotropium 18 MICROgram(s) Capsule 1 Capsule(s) Inhalation daily    MEDICATIONS  (PRN):  acetaminophen     Tablet .. 650 milliGRAM(s) Oral every 8 hours PRN Mild Pain (1 - 3)  ALBUTerol    90 MICROgram(s) HFA Inhaler 1 Puff(s) Inhalation every 6 hours PRN Shortness of Breath  LORazepam     Tablet 0.5 milliGRAM(s) Oral two times a day PRN Anxiety      LABS:                        9.8    3.99  )-----------( 171      ( 19 May 2022 08:15 )             31.7     05-    144  |  109  |  25<H>  ----------------------------<  82  3.8   |  25  |  1.2    Ca    8.5      19 May 2022 08:15    TPro  7.2  /  Alb  4.3  /  TBili  0.4  /  DBili  x   /  AST  30  /  ALT  14  /  AlkPhos  216<H>  05-18    PT/INR - ( 18 May 2022 09:30 )   PT: 18.00 sec;   INR: 1.57 ratio         PTT - ( 18 May 2022 09:30 )  PTT:26.1 sec  Urinalysis Basic - ( 18 May 2022 13:30 )    Color: Yellow / Appearance: Clear / S.020 / pH: x  Gluc: x / Ketone: Negative  / Bili: Negative / Urobili: 0.2 mg/dL   Blood: x / Protein: Negative mg/dL / Nitrite: Negative   Leuk Esterase: Large / RBC: 6-10 /HPF / WBC 10-25 /HPF   Sq Epi: x / Non Sq Epi: Few /HPF / Bacteria: Many        RADIOLOGY & ADDITIONAL STUDIES: none new

## 2022-05-19 NOTE — PHYSICAL THERAPY INITIAL EVALUATION ADULT - ADDITIONAL COMMENTS
pt lives in a private house with her  and daughter. There are 3 stairs to enter the house with a railing on the R side going up. pt and her  live on the first level of the house. pt amb with rollator prior to admission when she was outside of the house. pt can amb independently inside her house.

## 2022-05-19 NOTE — PHYSICAL THERAPY INITIAL EVALUATION ADULT - GENERAL OBSERVATIONS, REHAB EVAL
8;45-9;05 pt received semifowler in bed, IV, +O2 via NC @ 2L, +call bell within reach, bedside table at reach. CHARLES Canseco is aware.

## 2022-05-19 NOTE — PROGRESS NOTE ADULT - ASSESSMENT
76 y/o female with a pmhx of HTN, COPD, FLAVIO, depression, HLD, and OA admitted with Acute UTI    #Cystitis vs pyelonephritis  #no JOYCE  #CKD3a  - CTAP:  No hydronephrosis or renal calculus. Small renal cysts.  - UA: LE+ nitrite (-), pyuria and bacteria  - Ceftriaxone  - f/u Ucx and Bcx  - US kidney and bladder: wnl  - Renal recs noted  - IVF  - tylenol prn fever  - zofran prn n/v  - resume lasix 20mg po upon d/c     #RT calf tenderness and swelling  - Rt LE duplex    #appendocoliths  - npo for possible surgical intervention for appendicitis  - spoke to radiology who is concerned for appendicitis and asked to repeat CT abd  - keep npo regardless of surgery recommendations   - f/u w/ surgical attending    #HTN  - c/w norvasc    #h/o of multiple TIA  #h/o of afib  - c/w metoprolol and Eliquis and statin    #COPD  #FLAVIO  - continue Albuterol MDI, Singular, Symbicort, Tiotropium  - c/w cpap    #depression  continue Seroquel    #HLD  - continue Statin    #OA  - tylenol prn pain 1-3  - morphine prn pain 7-10    #proph  - vte: eliquis 78 y/o female with a pmhx of HTN, COPD, FLAVIO, depression, HLD, and OA admitted with Acute UTI    #Cystitis vs pyelonephritis  #no JOYCE  #CKD3a  - CTAP:  No hydronephrosis or renal calculus. Small renal cysts.  - UA: LE+ nitrite (-), pyuria and bacteria  - Ceftriaxone  - f/u Ucx and Bcx  - US kidney and bladder: wnl  - Renal recs noted  - IVF  - tylenol prn fever  - zofran prn n/v  - resume lasix 20mg po upon d/c     #RT calf tenderness and swelling  - Rt LE duplex    #appendocoliths  - npo for possible surgical intervention for appendicitis  - spoke to radiology who is concerned for appendicitis and asked to repeat CT abd  - keep npo regardless of surgery recommendations   - f/u w/ surgical attending    #HTN  - c/w norvasc    #h/o of multiple TIA  #h/o of afib  - c/w metoprolol and Eliquis and statin  - TTE (10/21/21): EF 55-60%, mod pHTN, no valvular or wall motion abn    #COPD  #FLAVIO  - continue Albuterol MDI, Singular, Symbicort, Tiotropium  - c/w cpap    #depression  continue Seroquel    #HLD  - continue Statin    #OA  - tylenol prn pain 1-3  - morphine prn pain 7-10    #proph  - vte: eliquis

## 2022-05-19 NOTE — CONSULT NOTE ADULT - ASSESSMENT
acute kidney injury, mild   - Cr stable 1.2   - baseline Cr 0.9   - no proteinuria   - UA c/w possible UTI   - no hydro / stones on CT or US imaging  HTN  Afib   PVD / carotids stenosis  anemia  s/p COVID vaccine booster   appendiceal fecolith    plan:    off lasix 40mg po qd, can resume 20mg po qd upon discharge  cont rocephin  f/u urine culture  NS lowered to 75cc/hr  cont amlodipine and metoprolol  will follow, thanks

## 2022-05-20 NOTE — PROGRESS NOTE ADULT - SUBJECTIVE AND OBJECTIVE BOX
Patient is a 77y old  Female who presents with a chief complaint of UTI (19 May 2022 13:38)      SUBJECTIVE / OVERNIGHT EVENTS:   ADDITIONAL REVIEW OF SYSTEMS:  as above    MEDICATIONS  (STANDING):  amLODIPine   Tablet 5 milliGRAM(s) Oral daily  apixaban 5 milliGRAM(s) Oral every 12 hours  aspirin  chewable 81 milliGRAM(s) Oral daily  atorvastatin 20 milliGRAM(s) Oral at bedtime  budesonide 160 MICROgram(s)/formoterol 4.5 MICROgram(s) Inhaler 2 Puff(s) Inhalation two times a day  cefTRIAXone   IVPB 1000 milliGRAM(s) IV Intermittent once  cefTRIAXone   IVPB 1000 milliGRAM(s) IV Intermittent every 24 hours  chlorhexidine 4% Liquid 1 Application(s) Topical <User Schedule>  gabapentin 300 milliGRAM(s) Oral three times a day  lamoTRIgine 100 milliGRAM(s) Oral two times a day  metoprolol tartrate 75 milliGRAM(s) Oral two times a day  montelukast 10 milliGRAM(s) Oral daily  multivitamin 1 Tablet(s) Oral daily  QUEtiapine 200 milliGRAM(s) Oral at bedtime  QUEtiapine 50 milliGRAM(s) Oral at bedtime  senna 2 Tablet(s) Oral at bedtime  sodium chloride 0.9%. 1000 milliLiter(s) (75 mL/Hr) IV Continuous <Continuous>  tiotropium 18 MICROgram(s) Capsule 1 Capsule(s) Inhalation daily    MEDICATIONS  (PRN):  acetaminophen     Tablet .. 650 milliGRAM(s) Oral every 8 hours PRN Mild Pain (1 - 3)  ALBUTerol    90 MICROgram(s) HFA Inhaler 1 Puff(s) Inhalation every 6 hours PRN Shortness of Breath  LORazepam     Tablet 0.5 milliGRAM(s) Oral two times a day PRN Anxiety      CAPILLARY BLOOD GLUCOSE        I&O's Summary      PHYSICAL EXAM:  Vital Signs Last 24 Hrs  T(C): 37.1 (19 May 2022 13:37), Max: 37.1 (19 May 2022 13:37)  T(F): 98.7 (19 May 2022 13:37), Max: 98.7 (19 May 2022 13:37)  HR: 75 (19 May 2022 13:37) (71 - 75)  BP: 180/77 (19 May 2022 13:37) (129/59 - 180/77)  BP(mean): --  RR: 16 (19 May 2022 13:37) (16 - 16)  SpO2: 95% (19 May 2022 05:41) (95% - 95%)  CONSTITUTIONAL: NAD, morbidly obese  EYES: PERRLA; conjunctiva and sclera clear  ENMT: Moist oral mucosa, no pharyngeal injection or exudates; normal dentition  NECK: thick neck circumference unable to examine JVD;   RESPIRATORY: Normal respiratory effort; lungs are clear to auscultation bilaterally  CARDIOVASCULAR: Regular rate and rhythm, normal S1 and S2, no murmur/rub/gallop; Rt lower extremity edema >LT Peripheral pulses are 2+ bilaterally  ABDOMEN: Nontender to palpation, normoactive bowel sounds, no rebound/guarding; No hepatosplenomegaly  MUSCULOSKELETAL: could not assess gait; no clubbing or cyanosis of digits; no joint swelling or tenderness to palpation  PSYCH: A+O to person, place, and time; affect appropriate      LABS:                        9.8    3.99  )-----------( 171      ( 19 May 2022 08:15 )             31.7     05-    144  |  109  |  25<H>  ----------------------------<  82  3.8   |  25  |  1.2    Ca    8.5      19 May 2022 08:15    TPro  7.2  /  Alb  4.3  /  TBili  0.4  /  DBili  x   /  AST  30  /  ALT  14  /  AlkPhos  216<H>  0518    PT/INR - ( 18 May 2022 09:30 )   PT: 18.00 sec;   INR: 1.57 ratio         PTT - ( 18 May 2022 09:30 )  PTT:26.1 sec  CARDIAC MARKERS ( 18 May 2022 09:30 )  x     / <0.01 ng/mL / 57 U/L / x     / x          Urinalysis Basic - ( 18 May 2022 13:30 )    Color: Yellow / Appearance: Clear / S.020 / pH: x  Gluc: x / Ketone: Negative  / Bili: Negative / Urobili: 0.2 mg/dL   Blood: x / Protein: Negative mg/dL / Nitrite: Negative   Leuk Esterase: Large / RBC: 6-10 /HPF / WBC 10-25 /HPF   Sq Epi: x / Non Sq Epi: Few /HPF / Bacteria: Many          RADIOLOGY & ADDITIONAL TESTS:  Results Reviewed:   Imaging Personally Reviewed:  Electrocardiogram Personally Reviewed:    COORDINATION OF CARE:  Care Discussed with Consultants/Other Providers [Y/N]:  Prior or Outpatient Records Reviewed [Y/N]:         Patient is a 77y old  Female who presents with a chief complaint of UTI (19 May 2022 13:38)      SUBJECTIVE / OVERNIGHT EVENTS: Discussed pt's surgery tomorrow that she wants to discuss with her pulmonologist. Answered her questions. npo tonight. continue to hold eliquis  ADDITIONAL REVIEW OF SYSTEMS:  as above    MEDICATIONS  (STANDING):  amLODIPine   Tablet 5 milliGRAM(s) Oral daily  apixaban 5 milliGRAM(s) Oral every 12 hours  aspirin  chewable 81 milliGRAM(s) Oral daily  atorvastatin 20 milliGRAM(s) Oral at bedtime  budesonide 160 MICROgram(s)/formoterol 4.5 MICROgram(s) Inhaler 2 Puff(s) Inhalation two times a day  cefTRIAXone   IVPB 1000 milliGRAM(s) IV Intermittent once  cefTRIAXone   IVPB 1000 milliGRAM(s) IV Intermittent every 24 hours  chlorhexidine 4% Liquid 1 Application(s) Topical <User Schedule>  gabapentin 300 milliGRAM(s) Oral three times a day  lamoTRIgine 100 milliGRAM(s) Oral two times a day  metoprolol tartrate 75 milliGRAM(s) Oral two times a day  montelukast 10 milliGRAM(s) Oral daily  multivitamin 1 Tablet(s) Oral daily  QUEtiapine 200 milliGRAM(s) Oral at bedtime  QUEtiapine 50 milliGRAM(s) Oral at bedtime  senna 2 Tablet(s) Oral at bedtime  sodium chloride 0.9%. 1000 milliLiter(s) (75 mL/Hr) IV Continuous <Continuous>  tiotropium 18 MICROgram(s) Capsule 1 Capsule(s) Inhalation daily    MEDICATIONS  (PRN):  acetaminophen     Tablet .. 650 milliGRAM(s) Oral every 8 hours PRN Mild Pain (1 - 3)  ALBUTerol    90 MICROgram(s) HFA Inhaler 1 Puff(s) Inhalation every 6 hours PRN Shortness of Breath  LORazepam     Tablet 0.5 milliGRAM(s) Oral two times a day PRN Anxiety      CAPILLARY BLOOD GLUCOSE        I&O's Summary      PHYSICAL EXAM:  Vital Signs Last 24 Hrs  T(C): 37.1 (19 May 2022 13:37), Max: 37.1 (19 May 2022 13:37)  T(F): 98.7 (19 May 2022 13:37), Max: 98.7 (19 May 2022 13:37)  HR: 75 (19 May 2022 13:37) (71 - 75)  BP: 180/77 (19 May 2022 13:37) (129/59 - 180/77)  BP(mean): --  RR: 16 (19 May 2022 13:37) (16 - 16)  SpO2: 95% (19 May 2022 05:41) (95% - 95%)  CONSTITUTIONAL: NAD, morbidly obese  EYES: PERRLA; conjunctiva and sclera clear  ENMT: Moist oral mucosa, no pharyngeal injection or exudates; normal dentition  NECK: thick neck circumference unable to examine JVD;   RESPIRATORY: Normal respiratory effort; lungs are clear to auscultation bilaterally  CARDIOVASCULAR: Regular rate and rhythm, normal S1 and S2, no murmur/rub/gallop; Rt lower extremity edema >LT Peripheral pulses are 2+ bilaterally  ABDOMEN: Nontender to palpation, normoactive bowel sounds, no rebound/guarding; No hepatosplenomegaly  MUSCULOSKELETAL: could not assess gait; no clubbing or cyanosis of digits; no joint swelling or tenderness to palpation  PSYCH: A+O to person, place, and time; affect appropriate      LABS:                        9.8    3.99  )-----------( 171      ( 19 May 2022 08:15 )             31.7     05-19    144  |  109  |  25<H>  ----------------------------<  82  3.8   |  25  |  1.2    Ca    8.5      19 May 2022 08:15    TPro  7.2  /  Alb  4.3  /  TBili  0.4  /  DBili  x   /  AST  30  /  ALT  14  /  AlkPhos  216<H>  05-18    PT/INR - ( 18 May 2022 09:30 )   PT: 18.00 sec;   INR: 1.57 ratio         PTT - ( 18 May 2022 09:30 )  PTT:26.1 sec  CARDIAC MARKERS ( 18 May 2022 09:30 )  x     / <0.01 ng/mL / 57 U/L / x     / x          Urinalysis Basic - ( 18 May 2022 13:30 )    Color: Yellow / Appearance: Clear / S.020 / pH: x  Gluc: x / Ketone: Negative  / Bili: Negative / Urobili: 0.2 mg/dL   Blood: x / Protein: Negative mg/dL / Nitrite: Negative   Leuk Esterase: Large / RBC: 6-10 /HPF / WBC 10-25 /HPF   Sq Epi: x / Non Sq Epi: Few /HPF / Bacteria: Many          RADIOLOGY & ADDITIONAL TESTS:  Results Reviewed:   Imaging Personally Reviewed:  Electrocardiogram Personally Reviewed:    COORDINATION OF CARE:  Care Discussed with Consultants/Other Providers [Y/N]:  Prior or Outpatient Records Reviewed [Y/N]:

## 2022-05-20 NOTE — PROGRESS NOTE ADULT - SUBJECTIVE AND OBJECTIVE BOX
NEPHROLOGY FOLLOW UP NOTE    cr better  bp's fair  no fever        PAST MEDICAL & SURGICAL HISTORY:  Hypertension      Depression      COPD (chronic obstructive pulmonary disease)      Other cardiomyopathy      Other emphysema      PVD (peripheral vascular disease)      Bipolar 1 disorder      FLAVIO on CPAP      Transient ischemic attack  x 3      Congestive heart failure      Falls      2019 novel coronavirus disease (COVID-19)      Respiratory failure requiring intubation      Afib      HLD (hyperlipidemia)      History of cholecystectomy      H/O carotid angioplasty        Allergies:  codeine (Other (Moderate))  Depakote (Unknown)  Dilaudid (Short breath; Rash)  IV Contrast (Anaphylaxis)  losartan (Angioedema)  Risperdal (Other)  verapamil (Short breath; Angioedema)    Home Medications Reviewed    SOCIAL HISTORY:  Denies ETOH,Smoking,   FAMILY HISTORY:  FH: heart disease (Father, Mother)          REVIEW OF SYSTEMS:  CONSTITUTIONAL: No weakness, fevers or chills  EYES/ENT: No visual changes;  No vertigo or throat pain   NECK: No pain or stiffness  RESPIRATORY: No cough, wheezing, hemoptysis; No shortness of breath  CARDIOVASCULAR: No chest pain or palpitations.  GASTROINTESTINAL: + abdominal or epigastric pain. + nausea, vomiting, or hematemesis; No diarrhea or constipation. No melena or hematochezia.  GENITOURINARY: No dysuria, frequency, foamy urine, urinary urgency, incontinence or hematuria  NEUROLOGICAL: No numbness or weakness  SKIN: No itching, burning, rashes, or lesions   VASCULAR: No bilateral lower extremity edema.   All other review of systems is negative unless indicated above.    PHYSICAL EXAM:  Constitutional: NAD  HEENT: anicteric sclera, oropharynx clear, MMM  Neck: No JVD  Respiratory: CTAB, no wheezes, rales or rhonchi  Cardiovascular: S1, S2, RRR  Gastrointestinal: BS+, soft, NT/ND  Extremities: No cyanosis or clubbing. No peripheral edema  Neurological: A/O x 3, no focal deficits  Psychiatric: Normal mood, normal affect  : No CVA tenderness. No stephens.   Skin: No rashes    Hospital Medications:   MEDICATIONS  (STANDING):  amLODIPine   Tablet 5 milliGRAM(s) Oral daily  apixaban 5 milliGRAM(s) Oral every 12 hours  aspirin  chewable 81 milliGRAM(s) Oral daily  atorvastatin 20 milliGRAM(s) Oral at bedtime  budesonide 160 MICROgram(s)/formoterol 4.5 MICROgram(s) Inhaler 2 Puff(s) Inhalation two times a day  cefTRIAXone   IVPB 1000 milliGRAM(s) IV Intermittent once  cefTRIAXone   IVPB 1000 milliGRAM(s) IV Intermittent every 24 hours  chlorhexidine 4% Liquid 1 Application(s) Topical <User Schedule>  gabapentin 300 milliGRAM(s) Oral three times a day  lamoTRIgine 100 milliGRAM(s) Oral two times a day  metoprolol tartrate 75 milliGRAM(s) Oral two times a day  metroNIDAZOLE  IVPB 500 milliGRAM(s) IV Intermittent every 8 hours  metroNIDAZOLE  IVPB      montelukast 10 milliGRAM(s) Oral daily  multivitamin 1 Tablet(s) Oral daily  QUEtiapine 200 milliGRAM(s) Oral at bedtime  QUEtiapine 50 milliGRAM(s) Oral at bedtime  senna 2 Tablet(s) Oral at bedtime  tiotropium 18 MICROgram(s) Capsule 1 Capsule(s) Inhalation daily        VITALS:  T(F): 96.8 (22 @ 13:53), Max: 98.4 (22 @ 04:48)  HR: 78 (22 @ 13:53)  BP: 153/68 (22 @ 13:53)  RR: 18 (22 @ 13:53)  SpO2: 97% (22 @ 09:57)  Wt(kg): --        LABS:      140  |  106  |  15  ----------------------------<  83  4.0   |  22  |  0.9    Ca    8.9      20 May 2022 07:25                            11.4   5.39  )-----------( 193      ( 20 May 2022 07:25 )             35.0       Urine Studies:  Urinalysis Basic - ( 18 May 2022 13:30 )    Color: Yellow / Appearance: Clear / S.020 / pH:   Gluc:  / Ketone: Negative  / Bili: Negative / Urobili: 0.2 mg/dL   Blood:  / Protein: Negative mg/dL / Nitrite: Negative   Leuk Esterase: Large / RBC: 6-10 /HPF / WBC 10-25 /HPF   Sq Epi:  / Non Sq Epi: Few /HPF / Bacteria: Many      Creatinine, Random Urine: 30 mg/dL ( @ 08:00)  Sodium, Random Urine: 61.0 mmoL/L ( @ 08:00)  Osmolality, Random Urine: 249 mos/kg ( @ 08:00)      RADIOLOGY & ADDITIONAL STUDIES:

## 2022-05-20 NOTE — PROGRESS NOTE ADULT - ASSESSMENT
77y old  Female PMH: COPD,HTN, HLD, Depression, TIA x 3, Anxiety and recurrent UTI'S. admitted with fever/dysuria, with additional finding of appendiceal fecolith.-     #UTI: +Klebsiella  #appendiceal fecalith    - Cont Rocephin & flagyl  -  f/u final urine cx  -Per cardio: Pt is at intermediate risk for cardiovascular complications kate-op/post-op- CHADS-VASc2: 6 - C/w Eliquis. Can hold Eliquis 12hrs before. Resume when possible  - Per pulm: from pulmonary point has  mild to moderate risk for surgery recommend to monitor end tidal co2 and use the bipap machine perioperatively   - For possible appendectomy    Will D/W Dr. Recinos 77y old  Female PMH: COPD,HTN, HLD, Depression, TIA x 3, Anxiety and recurrent UTI'S. admitted with fever/dysuria, with additional finding of appendiceal fecolith.-     #UTI: +Klebsiella  #appendiceal fecalith    - Cont Rocephin & flagyl  -  f/u final urine cx  -Per cardio: Pt is at intermediate risk for cardiovascular complications kate-op/post-op- CHADS-VASc2: 6 - C/w Eliquis. Can hold Eliquis 12hrs before. Resume when possible  - Per pulm: from pulmonary point has  mild to moderate risk for surgery recommend to monitor end tidal co2 and use the bipap machine perioperatively   - For possible appendectomy    Will D/W Dr. Recinos    ADDENDUM:  Pt seen by Dr. Recinos: pt without abdominal pain, CT negative for appendicitis therefore surgery not warranted at this time. This was D/W hospitalist, Dr. Hutson, Pt agrees with plan  Treat UTI

## 2022-05-20 NOTE — PROGRESS NOTE ADULT - ASSESSMENT
acute kidney injury   - prerenal   - resolved   - no proteinuria   - no hydro / stones on CT or US imaging  UTI  HTN  Afib   PVD / carotids stenosis  anemia  s/p COVID vaccine booster   appendiceal fecolith    plan:    resume lasix 20mg po qd upon discharge  cont rocephin, f/u klebsiella sensitivities   can dc ivf  cont amlodipine and metoprolol  full code  will sign off

## 2022-05-20 NOTE — CONSULT NOTE ADULT - SUBJECTIVE AND OBJECTIVE BOX
HPI: 76 yo female with PMHx Afib on Eliquis and COPD on 4L home O2 requiring intubation in past, HTN, HLD, CHF, FLAVIO, TIA, carotid stenosis s/p angioplasty  presents after getting 4th COVID vaccine yesterday and having vomiting last PM. Pt woke up this AM weak and having difficulty ambulating so her  called EMS. Pt denies any abdominal pain (until they pressed on it here) or decreased appetite. Pt had normal BM this AM. Pt also with c/o severe myalgias. Pt states her abdomen feels sore "from all the wretching". Denies fever/chills            PAST MEDICAL & SURGICAL HISTORY:  Hypertension      Depression      COPD (chronic obstructive pulmonary disease)      Other cardiomyopathy      Other emphysema      PVD (peripheral vascular disease)      Allergic reaction      Bipolar 1 disorder      FLAVIO on CPAP      Transient ischemic attack      Congestive heart failure      Falls      2019 novel coronavirus disease (COVID-19)      Respiratory failure requiring intubation      Afib      History of cholecystectomy      H/O carotid angioplasty          MEDICATIONS  (STANDING):    MEDICATIONS  (PRN):      Allergies    codeine (Other (Moderate))  Depakote (Unknown)  Dilaudid (Short breath; Rash)  IV Contrast (Anaphylaxis)  losartan (Angioedema)  Risperdal (Other)  verapamil (Short breath; Angioedema)    Intolerances        SOCIAL HISTORY:    FAMILY HISTORY:  No pertinent family history in first degree relatives            Physical Exam:  General: NAD, resting comfortably  HEENT: NC/AT, EOMI, normal hearing, no oral lesions, no LAD, neck supple  Pulmonary: normal resp effort, CTA-B  Cardiovascular: NSR, no murmurs  Abdominal: soft, ND, pt with tenderness on left side of abdomen to light palpation, no RLQ tenderness  Extremities: WWP, normal strength, no clubbing/cyanosis/edema  Neuro: A/O x 3, CNs II-XII grossly intact, normal sensation, no focal deficits  Pulses: palpable distal pulses    Vital Signs Last 24 Hrs  T(C): --  T(F): --  HR: 94 (18 May 2022 09:10) (94 - 94)  BP: 137/65 (18 May 2022 09:10) (137/65 - 137/65)  BP(mean): --  RR: 20 (18 May 2022 09:10) (20 - 20)  SpO2: 94% (18 May 2022 09:10) (94% - 94%)    I&O's Summary          LABS:                        11.5   5.32  )-----------( 227      ( 18 May 2022 09:30 )             36.5         142  |  103  |  25<H>  ----------------------------<  122<H>  4.3   |  27  |  1.2    Ca    10.8<H>      18 May 2022 09:30    TPro  7.2  /  Alb  4.3  /  TBili  0.4  /  DBili  x   /  AST  30  /  ALT  14  /  AlkPhos  216<H>      PT/INR - ( 18 May 2022 09:30 )   PT: 18.00 sec;   INR: 1.57 ratio         PTT - ( 18 May 2022 09:30 )  PTT:26.1 sec  Urinalysis Basic - ( 18 May 2022 13:30 )    Color: Yellow / Appearance: Clear / S.020 / pH: x  Gluc: x / Ketone: Negative  / Bili: Negative / Urobili: 0.2 mg/dL   Blood: x / Protein: Negative mg/dL / Nitrite: Negative   Leuk Esterase: Large / RBC: 6-10 /HPF / WBC 10-25 /HPF   Sq Epi: x / Non Sq Epi: Few /HPF / Bacteria: Many      CAPILLARY BLOOD GLUCOSE      POCT Blood Glucose.: 124 mg/dL (18 May 2022 09:03)    LIVER FUNCTIONS - ( 18 May 2022 09:30 )  Alb: 4.3 g/dL / Pro: 7.2 g/dL / ALK PHOS: 216 U/L / ALT: 14 U/L / AST: 30 U/L / GGT: x                   RADIOLOGY & ADDITIONAL STUDIES:    < from: CT Abdomen and Pelvis No Cont (22 @ 11:31) >  IMPRESSION:    No definite CT evidence for acute intra-abdominal pathology.    Areas of mild appendiceal dilatation secondary to internal appendicoliths   within an otherwise unremarkable appearing appendix with no evidence of   periappendiceal inflammation.    --- End of Report ---            TOREY BARNETT MD; Attending Radiologist  This document has been electronically signed. May 18 2022 12:52PM    < end of copied text >            
NEPHROLOGY CONSULTATION NOTE    76 yo female with PMh of HTN, Afib, COPD, UTI's, carotid stenosis, PVD, dyslipidemia, FLAVIO, carotid stenosis presents with n/v/abd pain.  Renal consulted for abnormal renal function.  PT denies any prior kidney disease.  Baseline Cr normal.  Voiding without difficulty.  Just had Covid Vacc booster.        PAST MEDICAL & SURGICAL HISTORY:  Hypertension      Depression      COPD (chronic obstructive pulmonary disease)      Other cardiomyopathy      Other emphysema      PVD (peripheral vascular disease)      Bipolar 1 disorder      FLAVIO on CPAP      Transient ischemic attack  x 3      Congestive heart failure      Falls      2019 novel coronavirus disease (COVID-19)      Respiratory failure requiring intubation      Afib      HLD (hyperlipidemia)      History of cholecystectomy      H/O carotid angioplasty        Allergies:  codeine (Other (Moderate))  Depakote (Unknown)  Dilaudid (Short breath; Rash)  IV Contrast (Anaphylaxis)  losartan (Angioedema)  Risperdal (Other)  verapamil (Short breath; Angioedema)    Home Medications Reviewed    SOCIAL HISTORY:  Denies ETOH,Smoking,   FAMILY HISTORY:  FH: heart disease (Father, Mother)          REVIEW OF SYSTEMS:  CONSTITUTIONAL: No weakness, fevers or chills  EYES/ENT: No visual changes;  No vertigo or throat pain   NECK: No pain or stiffness  RESPIRATORY: No cough, wheezing, hemoptysis; No shortness of breath  CARDIOVASCULAR: No chest pain or palpitations.  GASTROINTESTINAL: + abdominal or epigastric pain. + nausea, vomiting, or hematemesis; No diarrhea or constipation. No melena or hematochezia.  GENITOURINARY: No dysuria, frequency, foamy urine, urinary urgency, incontinence or hematuria  NEUROLOGICAL: No numbness or weakness  SKIN: No itching, burning, rashes, or lesions   VASCULAR: No bilateral lower extremity edema.   All other review of systems is negative unless indicated above.    PHYSICAL EXAM:  Constitutional: NAD  HEENT: anicteric sclera, oropharynx clear, MMM  Neck: No JVD  Respiratory: CTAB, no wheezes, rales or rhonchi  Cardiovascular: S1, S2, RRR  Gastrointestinal: BS+, soft, NT/ND  Extremities: No cyanosis or clubbing. No peripheral edema  Neurological: A/O x 3, no focal deficits  Psychiatric: Normal mood, normal affect  : No CVA tenderness. No stephens.   Skin: No rashes    Hospital Medications:   MEDICATIONS  (STANDING):  amLODIPine   Tablet 5 milliGRAM(s) Oral daily  apixaban 5 milliGRAM(s) Oral every 12 hours  aspirin  chewable 81 milliGRAM(s) Oral daily  atorvastatin 20 milliGRAM(s) Oral at bedtime  budesonide 160 MICROgram(s)/formoterol 4.5 MICROgram(s) Inhaler 2 Puff(s) Inhalation two times a day  cefTRIAXone   IVPB 1000 milliGRAM(s) IV Intermittent once  cefTRIAXone   IVPB 1000 milliGRAM(s) IV Intermittent every 24 hours  chlorhexidine 4% Liquid 1 Application(s) Topical <User Schedule>  gabapentin 300 milliGRAM(s) Oral three times a day  lamoTRIgine 100 milliGRAM(s) Oral two times a day  metoprolol tartrate 75 milliGRAM(s) Oral two times a day  montelukast 10 milliGRAM(s) Oral daily  multivitamin 1 Tablet(s) Oral daily  QUEtiapine 200 milliGRAM(s) Oral at bedtime  QUEtiapine 50 milliGRAM(s) Oral at bedtime  senna 2 Tablet(s) Oral at bedtime  sodium chloride 0.9%. 1000 milliLiter(s) (120 mL/Hr) IV Continuous <Continuous>  tiotropium 18 MICROgram(s) Capsule 1 Capsule(s) Inhalation daily        VITALS:  T(F): 96.3 (22 @ 05:41), Max: 96.3 (22 @ 20:03)  HR: 72 (22 @ 05:41)  BP: 154/70 (22 @ 05:41)  RR: 16 (22 @ 05:41)  SpO2: 95% (22 @ 05:41)  Wt(kg): --    Height (cm): 160 ( @ 20:03)  Weight (kg): 85.1 ( @ 20:03)  BMI (kg/m2): 33.2 ( @ 20:03)  BSA (m2): 1.88 ( @ 20:03)    LABS:      144  |  109  |  25<H>  ----------------------------<  82  3.8   |  25  |  1.2    Ca    8.5      19 May 2022 08:15    TPro  7.2  /  Alb  4.3  /  TBili  0.4  /  DBili      /  AST  30  /  ALT  14  /  AlkPhos  216<H>  05-18                          9.8    3.99  )-----------( 171      ( 19 May 2022 08:15 )             31.7       Urine Studies:  Urinalysis Basic - ( 18 May 2022 13:30 )    Color: Yellow / Appearance: Clear / S.020 / pH:   Gluc:  / Ketone: Negative  / Bili: Negative / Urobili: 0.2 mg/dL   Blood:  / Protein: Negative mg/dL / Nitrite: Negative   Leuk Esterase: Large / RBC: 6-10 /HPF / WBC 10-25 /HPF   Sq Epi:  / Non Sq Epi: Few /HPF / Bacteria: Many          RADIOLOGY & ADDITIONAL STUDIES:  
  Patient is a 77y old  Female who presents with a chief complaint of UTI 76 y/o female PMH: COPD,HTN, HLD, Depression, TIA x 3, Anxiety and recurrent UTI'S. As per  patient was on  the toilet last night and had difficulty getting up and walking. She was awake and responsive. Attempted to use her walker to get to couch but she was very weak and it  took a long time.  - Temp at home 101F, + chills.   - Acute onset for all above  -Reports recurrent UTI's, with present burning on urination and lower abdominal pain  -Patient had her 4th Covid Booster vaccine on 2022    Seen by Surgical Team for evaluation of Appendiceal fecolith seen on CT Scan pending appendicectomy      (18 May 2022 17:31)      PAST MEDICAL & SURGICAL HISTORY:  Hypertension      Depression      COPD (chronic obstructive pulmonary disease)      Other cardiomyopathy      Other emphysema      PVD (peripheral vascular disease)      Bipolar 1 disorder      FLAVIO on CPAP      Transient ischemic attack  x 3      Congestive heart failure      Falls      2019 novel coronavirus disease (COVID-19)      Respiratory failure requiring intubation      Afib      HLD (hyperlipidemia)      History of cholecystectomy      H/O carotid angioplasty          FAMILY HISTORY:  FH: heart disease (Father, Mother)      Family history: No family cardiovascular system   Occupation:  Alochol: Denied  Smoking: Denied  Drug Use: Denied  Marital Status:           Allergies    codeine (Other (Moderate))  Depakote (Unknown)  Dilaudid (Short breath; Rash)  IV Contrast (Anaphylaxis)  losartan (Angioedema)  Risperdal (Other)  verapamil (Short breath; Angioedema)    Intolerances        Home Medications:  albuterol 90 mcg/inh inhalation aerosol: 1 puff(s) inhaled every 4 hours, As needed, Shortness of Breath (18 May 2022 17:25)  amLODIPine 5 mg oral tablet: 1 tab(s) orally once a day (18 May 2022 17:25)  aspirin 81 mg oral tablet, chewable: 1 tab(s) orally once a day (18 May 2022 17:25)  atorvastatin 20 mg oral tablet: 1 tab(s) orally once a day (at bedtime) (18 May 2022 17:25)  Breo Ellipta 100 mcg-25 mcg/inh inhalation powder: 1 puff(s) inhaled once a day (18 May 2022 17:25)  gabapentin 300 mg oral capsule: 1 cap(s) orally 3 times a day (18 May 2022 17:25)  lamoTRIgine 100 mg oral tablet: 1 tab(s) orally 2 times a day (18 May 2022 17:25)  LORazepam 0.5 mg oral tablet: 1 tab(s) orally 2 times a day, As Needed (18 May 2022 17:25)  montelukast 10 mg oral tablet: 1 tab(s) orally once a day (18 May 2022 17:25)  QUEtiapine 200 mg oral tablet: 1 tab(s) orally once a day (at bedtime) (18 May 2022 17:25)  QUEtiapine 50 mg oral tablet: 1 tab(s) orally once a day (at bedtime) (18 May 2022 17:25)  senna oral tablet: 2 tab(s) orally once a day (at bedtime) (18 May 2022 17:25)  tiotropium 18 mcg inhalation capsule: 1 cap(s) inhaled once a day (18 May 2022 17:25)      ROS: as in HPI; All other systems reviewed are negative        PHYSICAL EXAM:  Vital Signs Last 24 Hrs  T(C): 36.9 (20 May 2022 04:48), Max: 37.1 (19 May 2022 13:37)  T(F): 98.4 (20 May 2022 04:48), Max: 98.7 (19 May 2022 13:37)  HR: 79 (20 May 2022 04:48) (59 - 79)  BP: 203/93 (20 May 2022 04:48) (131/55 - 203/93)  BP(mean): --  RR: 18 (20 May 2022 04:48) (16 - 18)  SpO2: --      GENERAL: NAD, well-groomed, well-developed  HEAD:  Atraumatic, Normocephalic  EYES: EOMI, PERRLA, conjunctiva and sclera clear  ENMT: No tonsillar erythema, exudates, or enlargement; Moist mucous membranes, Good dentition, No lesions  NECK: Supple, No JVD, Normal thyroid  NERVOUS SYSTEM:  Alert & Oriented X3, Good concentration; Motor Strength 5/5 B/L upper and lower extremities; DTRs 2+ intact and symmetric  CHEST/LUNG: Clear to percussion bilaterally; No rales, rhonchi, wheezing, or rubs  HEART: Regular rate and rhythm; No murmurs, rubs, or gallops  ABDOMEN: Soft,   EXTREMITIES:  2+ Peripheral Pulses, No clubbing, cyanosis, or edema      HOSPITAL MEDICATIONS:  MEDICATIONS  (STANDING):  amLODIPine   Tablet 5 milliGRAM(s) Oral daily  aspirin  chewable 81 milliGRAM(s) Oral daily  atorvastatin 20 milliGRAM(s) Oral at bedtime  budesonide 160 MICROgram(s)/formoterol 4.5 MICROgram(s) Inhaler 2 Puff(s) Inhalation two times a day  cefTRIAXone   IVPB 1000 milliGRAM(s) IV Intermittent once  cefTRIAXone   IVPB 1000 milliGRAM(s) IV Intermittent every 24 hours  chlorhexidine 4% Liquid 1 Application(s) Topical <User Schedule>  gabapentin 300 milliGRAM(s) Oral three times a day  heparin   Injectable 5000 Unit(s) SubCutaneous every 8 hours  lamoTRIgine 100 milliGRAM(s) Oral two times a day  metoprolol tartrate 75 milliGRAM(s) Oral two times a day  metroNIDAZOLE  IVPB 500 milliGRAM(s) IV Intermittent every 8 hours  metroNIDAZOLE  IVPB      montelukast 10 milliGRAM(s) Oral daily  multivitamin 1 Tablet(s) Oral daily  QUEtiapine 200 milliGRAM(s) Oral at bedtime  QUEtiapine 50 milliGRAM(s) Oral at bedtime  senna 2 Tablet(s) Oral at bedtime  sodium chloride 0.9%. 1000 milliLiter(s) (75 mL/Hr) IV Continuous <Continuous>  tiotropium 18 MICROgram(s) Capsule 1 Capsule(s) Inhalation daily    MEDICATIONS  (PRN):  acetaminophen     Tablet .. 650 milliGRAM(s) Oral every 8 hours PRN Mild Pain (1 - 3)  ALBUTerol    90 MICROgram(s) HFA Inhaler 1 Puff(s) Inhalation every 6 hours PRN Shortness of Breath  LORazepam     Tablet 0.5 milliGRAM(s) Oral two times a day PRN Anxiety      LABS:                        11.4   5.39  )-----------( 193      ( 20 May 2022 07:25 )             35.0     05-    144  |  109  |  25<H>  ----------------------------<  82  3.8   |  25  |  1.2    Ca    8.5      19 May 2022 08:15    TPro  7.2  /  Alb  4.3  /  TBili  0.4  /  DBili  x   /  AST  30  /  ALT  14  /  AlkPhos  216<H>  05-18    PT/INR - ( 18 May 2022 09:30 )   PT: 18.00 sec;   INR: 1.57 ratio         PTT - ( 18 May 2022 09:30 )  PTT:26.1 sec  Urinalysis Basic - ( 18 May 2022 13:30 )    Color: Yellow / Appearance: Clear / S.020 / pH: x  Gluc: x / Ketone: Negative  / Bili: Negative / Urobili: 0.2 mg/dL   Blood: x / Protein: Negative mg/dL / Nitrite: Negative   Leuk Esterase: Large / RBC: 6-10 /HPF / WBC 10-25 /HPF   Sq Epi: x / Non Sq Epi: Few /HPF / Bacteria: Many        Venous Blood Gas:   @ 12:21  7.41/55/22/35/27.1  VBG Lactate: 1.50        Culture - Blood (collected 18 May 2022 18:50)  Source: .Blood Blood-Venous  Preliminary Report (20 May 2022 01:03):    No growth to date.    Culture - Urine (collected 18 May 2022 13:30)  Source: Clean Catch Clean Catch (Midstream)  Preliminary Report (19 May 2022 23:34):    >100,000 CFU/ml Gram Negative Rods        RADIOLOGY: cxr increase marking b/l same as before   [ ] Reviewed and interpreted by me    ECHO:    Point of Care Ultrasound Findings;    PFT:      
HPI:                                   78 y/o female PMH: COPD,HTN, HLD, Depression, TIA x 3, Anxiety and recurrent UTI'S. As per  patient was on  the toilet last night and had difficulty getting up and walking. She was awake and responsive. Attempted to use her walker to get to couch but she was very weak and it  took a long time.  - Temp at home 101F, + chills.   - Acute onset for all above  -Reports recurrent UTI's, with present burning on urination and lower abdominal pain  -Patient had her 4th Covid Booster vaccine on Tuesday 5/17/2022    Seen by Surgical Team for evaluation of Appendiceal fecolith seen on CT Scan     (18 May 2022 17:31)        HPI-Cardiology   Pt with below Hx, evaluated at bedside. Currently admitted in [facility] for weakness and UTI. Consulted for cardiovascular risk stratification for possible appendectomy. Pt states that she does feel SOB at times when ambulating, which she attributes to her COPD. She denies CP at rest or exertion, palpitations, lightheadedness/dizziness or nausea/vomiting. Radiology tests and hospital records, were reviewed, as well as previous notes on this patient.       PAST MEDICAL & SURGICAL HISTORY  Hypertension    Depression    COPD (chronic obstructive pulmonary disease)    Other cardiomyopathy    Other emphysema    PVD (peripheral vascular disease)    Bipolar 1 disorder    FLAVIO on CPAP    Transient ischemic attack  x 3    Congestive heart failure    Falls    2019 novel coronavirus disease (COVID-19)    Respiratory failure requiring intubation    Afib    HLD (hyperlipidemia)    History of cholecystectomy    H/O carotid angioplasty        FAMILY HISTORY:  FAMILY HISTORY:  FH: heart disease (Father, Mother)        SOCIAL HISTORY:  []smoker-denies  []Alcohol-denies  []Drug-denies      ALLERGIES:  codeine (Other (Moderate))  Depakote (Unknown)  Dilaudid (Short breath; Rash)  IV Contrast (Anaphylaxis)  losartan (Angioedema)  Risperdal (Other)  verapamil (Short breath; Angioedema)      MEDICATIONS:  MEDICATIONS  (STANDING):  amLODIPine   Tablet 5 milliGRAM(s) Oral daily  aspirin  chewable 81 milliGRAM(s) Oral daily  atorvastatin 20 milliGRAM(s) Oral at bedtime  budesonide 160 MICROgram(s)/formoterol 4.5 MICROgram(s) Inhaler 2 Puff(s) Inhalation two times a day  cefTRIAXone   IVPB 1000 milliGRAM(s) IV Intermittent once  cefTRIAXone   IVPB 1000 milliGRAM(s) IV Intermittent every 24 hours  chlorhexidine 4% Liquid 1 Application(s) Topical <User Schedule>  gabapentin 300 milliGRAM(s) Oral three times a day  heparin   Injectable 5000 Unit(s) SubCutaneous every 8 hours  lamoTRIgine 100 milliGRAM(s) Oral two times a day  metoprolol tartrate 75 milliGRAM(s) Oral two times a day  montelukast 10 milliGRAM(s) Oral daily  multivitamin 1 Tablet(s) Oral daily  QUEtiapine 200 milliGRAM(s) Oral at bedtime  QUEtiapine 50 milliGRAM(s) Oral at bedtime  senna 2 Tablet(s) Oral at bedtime  sodium chloride 0.9%. 1000 milliLiter(s) (75 mL/Hr) IV Continuous <Continuous>  tiotropium 18 MICROgram(s) Capsule 1 Capsule(s) Inhalation daily    MEDICATIONS  (PRN):  acetaminophen     Tablet .. 650 milliGRAM(s) Oral every 8 hours PRN Mild Pain (1 - 3)  ALBUTerol    90 MICROgram(s) HFA Inhaler 1 Puff(s) Inhalation every 6 hours PRN Shortness of Breath  LORazepam     Tablet 0.5 milliGRAM(s) Oral two times a day PRN Anxiety      HOME MEDICATIONS:  Home Medications:  albuterol 90 mcg/inh inhalation aerosol: 1 puff(s) inhaled every 4 hours, As needed, Shortness of Breath (18 May 2022 17:25)  amLODIPine 5 mg oral tablet: 1 tab(s) orally once a day (18 May 2022 17:25)  aspirin 81 mg oral tablet, chewable: 1 tab(s) orally once a day (18 May 2022 17:25)  atorvastatin 20 mg oral tablet: 1 tab(s) orally once a day (at bedtime) (18 May 2022 17:25)  Breo Ellipta 100 mcg-25 mcg/inh inhalation powder: 1 puff(s) inhaled once a day (18 May 2022 17:25)  gabapentin 300 mg oral capsule: 1 cap(s) orally 3 times a day (18 May 2022 17:25)  lamoTRIgine 100 mg oral tablet: 1 tab(s) orally 2 times a day (18 May 2022 17:25)  LORazepam 0.5 mg oral tablet: 1 tab(s) orally 2 times a day, As Needed (18 May 2022 17:25)  montelukast 10 mg oral tablet: 1 tab(s) orally once a day (18 May 2022 17:25)  QUEtiapine 200 mg oral tablet: 1 tab(s) orally once a day (at bedtime) (18 May 2022 17:25)  QUEtiapine 50 mg oral tablet: 1 tab(s) orally once a day (at bedtime) (18 May 2022 17:25)  senna oral tablet: 2 tab(s) orally once a day (at bedtime) (18 May 2022 17:25)  tiotropium 18 mcg inhalation capsule: 1 cap(s) inhaled once a day (18 May 2022 17:25)      VITALS:   T(F): 98.7 (05-19 @ 13:37), Max: 98.7 (05-19 @ 13:37)  HR: 75 (05-19 @ 13:37) (71 - 94)  BP: 180/77 (05-19 @ 13:37) (129/59 - 180/77)  BP(mean): --  RR: 16 (05-19 @ 13:37) (16 - 20)  SpO2: 95% (05-19 @ 05:41) (94% - 95%)        REVIEW OF SYSTEMS:  See HPI      PHYSICAL EXAM:  NEURO: patient is awake , alert and oriented  GEN: Not in acute distress  NECK: no thyroid enlargement, no JVD  LUNGS: Mild Rhonchi noted B/Lat bases  CARDIOVASCULAR: S1/S2 present, Irregular rate, no murmurs or rubs, no carotid bruits,  + PP bilaterally  ABD: Soft, non-tender, non-distended, +BS  EXT: No DEVAN  SKIN: Intact        LABS:                        9.8    3.99  )-----------( 171      ( 19 May 2022 08:15 )             31.7     05-19    144  |  109  |  25<H>  ----------------------------<  82  3.8   |  25  |  1.2    Ca    8.5      19 May 2022 08:15    TPro  7.2  /  Alb  4.3  /  TBili  0.4  /  DBili  x   /  AST  30  /  ALT  14  /  AlkPhos  216<H>  05-18    PT/INR - ( 18 May 2022 09:30 )   PT: 18.00 sec;   INR: 1.57 ratio         PTT - ( 18 May 2022 09:30 )  PTT:26.1 sec    CARDIAC MARKERS ( 18 May 2022 09:30 )  x     / <0.01 ng/mL / 57 U/L / x     / x              Serum Pro-Brain Natriuretic Peptide: 2379 pg/mL (05-18-22 @ 09:30)        RADIOLOGY:  -CXR:  < from: Xray Chest 1 View- PORTABLE-Urgent (05.18.22 @ 09:52) >  Impression:    Suggestion of hazy bilateral opacities. Follow-up recommended.        --- End of Report ---    < end of copied text >        CT Abd/Pelvis:    < from: CT Abdomen and Pelvis No Cont (05.19.22 @ 14:52) >    IMPRESSION:  1.  Unchanged mildly dilated appendix containing numerous appendicoliths.   No periappendiceal inflammation or fluid collection. No definite CT   evidence of acute appendicitis. Recommend correlation with symptoms and   white blood cell count.  2.  Stable exam in this short interval follow.    --- End of Report ---    < end of copied text >        Stress Test:  < from: NM Nuclear Stress Pharmacologic Multiple (12.01.20 @ 10:59) >    Impression:  1. NORMAL LEXISCAN / REST MYOCARDIAL PERFUSION TOMOGRAPHY, WITH NO EVIDENCE FOR ISCHEMIA DURING LEXISCAN INFUSION.  2. NORMAL RESTING LEFT VENTRICULAR WALL MOTION AND WALL THICKENING.  3. LEFT VENTRICULAR EJECTION FRACTION OF  79 % WHICH IS WITHIN RANGE OF NORMAL.    < end of copied text >        ECHO:  < from: TTE Echo Complete w/o Contrast w/ Doppler (10.22.21 @ 08:32) >    Summary:   1. Left ventricular ejection fraction, by visual estimation, is 55 to 60%.   2. Mild left ventricular hypertrophy.   3. There is no evidence of pericardial effusion.   4. Mild mitral annular calcification.   5. No evidence of mitral valve regurgitation.   6. Mild tricuspid regurgitation.   7. Estimated pulmonary artery systolic pressure is 53.0 mmHg assuming a right atrial pressure of 3 mmHg, which is consistent with moderate pulmonary hypertension.   8. There is mild aortic root calcification.    < end of copied text >      ECG:  < from: 12 Lead ECG (05.18.22 @ 09:26) >  Normal sinus rhythm  Possible Left atrial enlargement  Nonspecific ST abnormality  Abnormal ECG    Confirmed by VIVIENNE HAQUE MD ([id number]) on 5/18/2022 9:44:46 AM    < end of copied text >      TELEMETRY EVENTS:

## 2022-05-20 NOTE — CONSULT NOTE ADULT - ASSESSMENT
Impression:  copd stable   inga           Plan:   Impression:  copd stable   inga   appendicitis   preopevaluation       Plan:  she has mild to moderate risk for surgery   now at her baseline line stable   recommend to use bipap 14/8 at night   continue breo Q 24 hrs   keep is= os   cardiology eval   keep pox > 92 %   from pulmonary point has  mild to moderate risk for surgery recommend to monitor end tidal co2 and use the bipap machine perioperatively   dvt prophylaxis , incentive spirometer

## 2022-05-20 NOTE — PROGRESS NOTE ADULT - SUBJECTIVE AND OBJECTIVE BOX
S: Pt without overt complaints of abd pain; tolerating full liquids  O; Vital Signs Last 24 Hrs  T(C): 36 (20 May 2022 13:53), Max: 36.9 (20 May 2022 04:48)  T(F): 96.8 (20 May 2022 13:53), Max: 98.4 (20 May 2022 04:48)  HR: 78 (20 May 2022 13:53) (59 - 79)  BP: 153/68 (20 May 2022 13:53) (131/55 - 203/93)  BP(mean): --  RR: 18 (20 May 2022 13:53) (16 - 18)  SpO2: 97% (20 May 2022 09:57) (97% - 97%)    MEDICATIONS  (STANDING):  amLODIPine   Tablet 5 milliGRAM(s) Oral daily  aspirin  chewable 81 milliGRAM(s) Oral daily  atorvastatin 20 milliGRAM(s) Oral at bedtime  budesonide 160 MICROgram(s)/formoterol 4.5 MICROgram(s) Inhaler 2 Puff(s) Inhalation two times a day  cefTRIAXone   IVPB 1000 milliGRAM(s) IV Intermittent once  cefTRIAXone   IVPB 1000 milliGRAM(s) IV Intermittent every 24 hours  chlorhexidine 4% Liquid 1 Application(s) Topical <User Schedule>  gabapentin 300 milliGRAM(s) Oral three times a day  heparin   Injectable 5000 Unit(s) SubCutaneous every 8 hours  lamoTRIgine 100 milliGRAM(s) Oral two times a day  metoprolol tartrate 75 milliGRAM(s) Oral two times a day  metroNIDAZOLE  IVPB 500 milliGRAM(s) IV Intermittent every 8 hours  metroNIDAZOLE  IVPB      montelukast 10 milliGRAM(s) Oral daily  multivitamin 1 Tablet(s) Oral daily  QUEtiapine 200 milliGRAM(s) Oral at bedtime  QUEtiapine 50 milliGRAM(s) Oral at bedtime  senna 2 Tablet(s) Oral at bedtime  tiotropium 18 MICROgram(s) Capsule 1 Capsule(s) Inhalation daily    MEDICATIONS  (PRN):  acetaminophen     Tablet .. 650 milliGRAM(s) Oral every 8 hours PRN Mild Pain (1 - 3)  ALBUTerol    90 MICROgram(s) HFA Inhaler 1 Puff(s) Inhalation every 6 hours PRN Shortness of Breath  LORazepam     Tablet 0.5 milliGRAM(s) Oral two times a day PRN Anxiety    EXAM:  GEN: NAD  abd: soft, +mild RLQ tenderness,. no rebound/guarding    Labs:  CAPILLARY BLOOD GLUCOSE                              11.4   5.39  )-----------( 193      ( 20 May 2022 07:25 )             35.0       Auto Neutrophil %: 69.3 % (05-20-22 @ 07:25)  Auto Immature Granulocyte %: 0.4 % (05-20-22 @ 07:25)    05-20    140  |  106  |  15  ----------------------------<  83  4.0   |  22  |  0.9      Calcium, Total Serum: 8.9 mg/dL (05-20-22 @ 07:25)      LFTs:     Blood Gas Venous - Lactate: 1.50 mmol/L (05-18-22 @ 12:21)  Lactate, Blood: 1.6 mmol/L (05-18-22 @ 09:30)      Coags:            Culture - Blood (collected 18 May 2022 18:50)  Source: .Blood Blood-Venous  Preliminary Report (20 May 2022 01:03):    No growth to date.    Culture - Urine (collected 18 May 2022 13:30)  Source: Clean Catch Clean Catch (Midstream)  Preliminary Report (20 May 2022 12:11):    >100,000 CFU/ml Klebsiella pneumoniae

## 2022-05-20 NOTE — PROGRESS NOTE ADULT - ASSESSMENT
76 y/o female with a pmhx of HTN, COPD, FLAVIO, depression, HLD, and OA admitted with Acute UTI    #Cystitis vs pyelonephritis  #no JOYCE  #CKD3a  - CTAP:  No hydronephrosis or renal calculus. Small renal cysts.  - UA: LE+ nitrite (-), pyuria and bacteria  - Ceftriaxone  - Ucx: Gm neg rods  - BCx: NGTD (only 1 bottle sent?)  - US kidney and bladder: wnl  - Renal recs noted  - IVF  - tylenol prn fever  - zofran prn n/v  - resume lasix 20mg po upon d/c     #RT calf tenderness and swelling  - Rt LE duplex    #appendicolith  - Both non con CTAP demonstrated dilated appendix to 7-8mm and 2 appendicolith are present appendicolith at base is 6x4mm and distal appendicolith is 1.1x 0.5 cm); high risk for perforation  - npo midnight for appendectomy on 5/21  - c/w with ceftriaxone and flagyl periop  - intermediate cardiac risk stratification  - pulm risk stratification  - hold eliquis  - f/u w/ surgical attending    #medical risk stratification  - Can continue all current medications periop  - c/w ceftriaxone and flagyl  - Huggins periop risk is 1.2%  - RCRI: 2 pts class 3 risk which is a 10.1% risk  - Pt is at intermediate risk for an intermediate risk surgery  - Pt will need to be on cpap postop    #HTN  - c/w norvasc    #h/o of multiple TIA  #h/o of afib  - c/w metoprolol and Eliquis and statin  - TTE (10/21/21): EF 55-60%, mod pHTN, no valvular or wall motion abn    #COPD  #FLAVIO  - continue Albuterol MDI, Singular, Symbicort, Tiotropium  - c/w cpap    #depression  continue Seroquel    #HLD  - continue Statin    #OA  - tylenol prn pain 1-3  - morphine prn pain 7-10    #proph  - vte: eliquis held; heparin sc 78 y/o female with a pmhx of HTN, COPD, FLAVIO, depression, HLD, and OA admitted with Acute UTI    #Cystitis vs pyelonephritis  #no JOYCE  #CKD3a  - CTAP:  No hydronephrosis or renal calculus. Small renal cysts.  - UA: LE+ nitrite (-), pyuria and bacteria  - Ceftriaxone  - Ucx: Gm neg rods  - BCx: NGTD (only 1 bottle sent?)  - US kidney and bladder: wnl  - Renal recs noted  - IVF  - tylenol prn fever  - zofran prn n/v  - resume lasix 20mg po upon d/c     #RT calf tenderness and swelling  - Rt LE duplex    #appendicolith  - Both non con CTAP demonstrated dilated appendix to 7-8mm and 2 appendicolith are present appendicolith at base is 6x4mm and distal appendicolith is 1.1x 0.5 cm); high risk for perforation  - npo midnight for appendectomy on 5/21  - c/w with ceftriaxone and flagyl periop  - intermediate cardiac risk stratification  - pulm risk stratification  - hold eliquis  - f/u w/ surgical attending    #medical risk stratification  - Can continue all current medications periop  - c/w ceftriaxone and flagyl  - Huggins periop risk is 1.2%  - RCRI: 2 pts class 3 risk which is a 10.1% risk  - Pt is at intermediate risk for an intermediate risk surgery  - Pt will need to be on cpap postop    #HTN  - c/w norvasc    #h/o of multiple TIA  #h/o of chronic persistent afib  - c/w metoprolol and Eliquis and statin  - TTE (10/21/21): EF 55-60%, mod pHTN, no valvular or wall motion abn    #COPD  #FLAVIO  - continue Albuterol MDI, Singular, Symbicort, Tiotropium  - c/w cpap    #depression  continue Seroquel    #HLD  - continue Statin    #OA  - tylenol prn pain 1-3  - morphine prn pain 7-10    #proph  - vte: eliquis held; heparin sc

## 2022-05-21 NOTE — DISCHARGE NOTE NURSING/CASE MANAGEMENT/SOCIAL WORK - PATIENT PORTAL LINK FT
You can access the FollowMyHealth Patient Portal offered by [facility] by registering at the following website: [email or web address]. By joining [employer]’s FollowMyHealth portal, you will also be able to view your health information using other applications (apps) compatible with our system.

## 2022-05-21 NOTE — DISCHARGE NOTE PROVIDER - CARE PROVIDER_API CALL
Milad Aguilar  [id number] [place] [address]  Phone: [phone]  Fax: [phone]  Follow Up Time:

## 2022-05-21 NOTE — PROGRESS NOTE ADULT - SUBJECTIVE AND OBJECTIVE BOX
S; Pt tolerating DASH diet, no overt abd pain after eating  O; Vital Signs Last 24 Hrs  T(C): 35.7 (21 May 2022 04:35), Max: 36.7 (20 May 2022 21:03)  T(F): 96.2 (21 May 2022 04:35), Max: 98 (20 May 2022 21:03)  HR: 64 (21 May 2022 04:35) (61 - 85)  BP: 148/68 (21 May 2022 04:35) (95/51 - 153/68)  BP(mean): --  RR: 18 (21 May 2022 04:35) (18 - 18)  SpO2: 96% (21 May 2022 03:19) (96% - 98%)    EXAM:  abd: soft, NT, ND    Labs:                        10.2   3.81  )-----------( 172      ( 21 May 2022 08:22 )             32.4       Auto Neutrophil %: 56.4 % (05-21-22 @ 08:22)  Auto Immature Granulocyte %: 0.3 % (05-21-22 @ 08:22)    05-21    140  |  108  |  13  ----------------------------<  87  4.1   |  21  |  0.9      Calcium, Total Serum: 8.4 mg/dL (05-21-22 @ 08:22)       Blood Gas Venous - Lactate: 1.50 mmol/L (05-18-22 @ 12:21)      Culture - Blood (collected 18 May 2022 18:50)  Source: .Blood Blood-Venous  Preliminary Report (20 May 2022 01:03):    No growth to date.    Culture - Urine (collected 18 May 2022 13:30)  Source: Clean Catch Clean Catch (Midstream)  Final Report (20 May 2022 22:04):    >100,000 CFU/ml Klebsiella pneumoniae  Organism: Klebsiella pneumoniae (20 May 2022 22:04)  Organism: Klebsiella pneumoniae (20 May 2022 22:04)

## 2022-05-21 NOTE — DISCHARGE NOTE NURSING/CASE MANAGEMENT/SOCIAL WORK - NSDCVIVACCINE_GEN_ALL_CORE_FT
influenza, injectable, quadrivalent, preservative free; 03-Oct-2019 11:19; Mona Martinez (RN); [employer]; [id number] (Exp. Date: 30-Jun-2020); IntraMuscular; Deltoid Left.; 0.5 milliLiter(s); VIS (VIS Published: 15-Aug-2019, VIS Presented: 03-Oct-2019);   Tdap; 13-Oct-2018 09:00; Shanell Davis (RN); [employer]; [id number] (Exp. Date: 22-Aug-2020); IntraMuscular; Deltoid Left.; 0.5 milliLiter(s); VIS (VIS Published: 09-May-2013, VIS Presented: 13-Oct-2018);

## 2022-05-21 NOTE — DISCHARGE NOTE PROVIDER - HOSPITAL COURSE
76 y/o female with a pmhx of HTN, COPD, FLAVIO, depression, HLD, and OA admitted with Acute UTI    #Cystitis   - CTAP:  No hydronephrosis or renal calculus. Small renal cysts.  - UA: LE+ nitrite (-), pyuria and bacteria  - Ceftriaxone, completed course  - Ucx: Gm neg rods- Kleb sens to Rocephin  - BCx: NGTD    #RT calf tenderness and swelling  - Rt LE duplex neg for DVT    #appendicolith  - Both non con CTAP demonstrated dilated appendix to 7-8mm and 2 appendicolith are present appendicolith at base is 6x4mm and distal appendicolith is 1.1x 0.5 cm)  - surgery eval appreciated- no need for surgery    #HTN  - c/w norvasc    #h/o of multiple TIA  #h/o of chronic persistent afib  - c/w metoprolol and Eliquis and statin  - TTE (10/21/21): EF 55-60%, mod pHTN, no valvular or wall motion abn    #COPD  #FLAVIO  - continue Albuterol MDI, Singular, Symbicort, Tiotropium  - c/w cpap    #depression  continue Seroquel    #HLD  - continue Statin    #OA  - tylenol prn pain 1-3  - morphine prn pain 7-10

## 2022-05-21 NOTE — DISCHARGE NOTE PROVIDER - NSDCCPCAREPLAN_GEN_ALL_CORE_FT
PRINCIPAL DISCHARGE DIAGNOSIS  Diagnosis: Weakness  Assessment and Plan of Treatment: Overall presentation due to UTI.  You have been treated with antibiotics and can follow with your primary doctor within 1-2 weeks.      SECONDARY DISCHARGE DIAGNOSES  Diagnosis: Acute UTI  Assessment and Plan of Treatment: You have completed antibiotics.  Please follow with your primary doctor.    Diagnosis: Appendicolith  Assessment and Plan of Treatment: You have stones in your appendix.  You do not need surgery at this time.  Please see your doctor if you experince abdominal pain.

## 2022-05-21 NOTE — PROGRESS NOTE ADULT - ASSESSMENT
77y old  Female PMH: COPD,HTN, HLD, Depression, TIA x 3, Anxiety and recurrent UTI'S. admitted with fever/dysuria, with additional finding of appendiceal fecolith.-     #UTI: +Klebsiella  #appendiceal fecalith    - no acute surgical intervention  -pt may F/U with Dr. Recinos PRN  -reconsult PRN

## 2022-05-21 NOTE — DISCHARGE NOTE PROVIDER - NSDCMRMEDTOKEN_GEN_ALL_CORE_FT
albuterol 90 mcg/inh inhalation aerosol: 1 puff(s) inhaled every 4 hours, As needed, Shortness of Breath  amLODIPine 5 mg oral tablet: 1 tab(s) orally once a day  apixaban 5 mg oral tablet: 1 tab(s) orally every 12 hours  aspirin 81 mg oral tablet, chewable: 1 tab(s) orally once a day  atorvastatin 20 mg oral tablet: 1 tab(s) orally once a day (at bedtime)  Breo Ellipta 100 mcg-25 mcg/inh inhalation powder: 1 puff(s) inhaled once a day  gabapentin 300 mg oral capsule: 1 cap(s) orally 3 times a day  lamoTRIgine 100 mg oral tablet: 1 tab(s) orally 2 times a day  LORazepam 0.5 mg oral tablet: 1 tab(s) orally 2 times a day, As Needed  metoprolol tartrate 75 mg oral tablet: 1 tab(s) orally 2 times a day  montelukast 10 mg oral tablet: 1 tab(s) orally once a day  Multiple Vitamins oral tablet: 1 tab(s) orally once a day  QUEtiapine 200 mg oral tablet: 1 tab(s) orally once a day (at bedtime)  QUEtiapine 50 mg oral tablet: 1 tab(s) orally once a day (at bedtime)  senna oral tablet: 2 tab(s) orally once a day (at bedtime)  tiotropium 18 mcg inhalation capsule: 1 cap(s) inhaled once a day

## 2022-05-21 NOTE — PROGRESS NOTE ADULT - ATTENDING COMMENTS
above noted discussed case with surgical PA abdomen soft no distension/tenderness
above noted discussed case with surgical PA and hospitalist abdomen soft mild tenderness lower abdomen ct scan noted will get pulmonary and cardiology consult
above noted discussed case with surgical resident and DR Owusu abdomen soft no distension/tenderness no need for surgery

## 2022-06-18 PROBLEM — E78.5 HYPERLIPIDEMIA, UNSPECIFIED: Chronic | Status: ACTIVE | Noted: 2022-01-01

## 2022-06-18 PROBLEM — G45.9 TRANSIENT CEREBRAL ISCHEMIC ATTACK, UNSPECIFIED: Chronic | Status: ACTIVE | Noted: 2019-09-29

## 2022-06-18 NOTE — H&P ADULT - HISTORY OF PRESENT ILLNESS
77-year-old female FROM HOME  with past medical history atrial fibrillation on Eliquis, TIA x 3, HTN, HLD, COPD on 4 L nasal cannula, septic shock October 2021, NSTEMI, status post catheterization showing non-obstructive coronary artery disease presents to the ED with coughing and shortness of breath. Pt states she has been coughing and started to have generalized abdominal pain over the last day and this am became shortness of breath. no /chills. no nausea, vomiting, diarrhea.  HAD TEMP of 102 in ER     pt was admitted last month for UTI , cx + for GNR kleb , also noted to have appendicolith but no indication for surgery at that time.     while in ER : NIV / iv abx / IVF initiated  77-year-old female FROM HOME  with past medical history atrial fibrillation on Eliquis, TIA x 3, HTN, HLD, COPD on 4 L nasal cannula, septic shock October 2021, NSTEMI, status post catheterization showing non-obstructive coronary artery disease presents to the ED with coughing and shortness of breath. Pt states she has been coughing and started to have generalized abdominal pain over the last day and this am became shortness of breath. no /chills. no nausea, vomiting, diarrhea.  HAD TEMP of 102 in ER     pt was admitted last month for UTI , cx + for GNR kleb , also noted to have appendicolith but no indication for surgery at that time.     while in ER : NIV / iv abx / IVF initiated     Since the pt has been in the ER, new c/o left knee pain , denies trauma / fall   pt uses walker and ambulates daily

## 2022-06-18 NOTE — CONSULT NOTE ADULT - ASSESSMENT
77-year-old female FROM HOME  with past medical history atrial fibrillation on Eliquis, TIA x 3, HTN, HLD, COPD on 4 L nasal cannula, septic shock October 2021, NSTEMI, status post catheterization showing non-obstructive coronary artery disease presents to the ED with coughing and shortness of breath.  ADMITTED FOR PNA/ UTI     ARTERIAL DOPPLER DONE AND D/W VASC FELLOW DR. MARTINES    RECC:  CT ANGIO WITH B/L LE RUN OFF      will d/w pt as she has a allergy to IV contrast

## 2022-06-18 NOTE — ED ADULT NURSE NOTE - NS TRANSFER DENTURES
Patient's clothing, dentures, and eyeglasses given to  to take home. Patient's medication list, scanned into computer/Partial

## 2022-06-18 NOTE — H&P ADULT - NSHPPHYSICALEXAM_GEN_ALL_CORE
Vital Signs Last 24 Hrs  T(C): 38.5 (18 Jun 2022 10:38), Max: 40.2 (18 Jun 2022 07:20)  T(F): 101.3 (18 Jun 2022 10:38), Max: 104.4 (18 Jun 2022 07:20)  HR: 104 (18 Jun 2022 10:38) (100 - 118)  BP: 140/60 (18 Jun 2022 10:38) (95/54 - 143/71)  RR: 24 (18 Jun 2022 10:38) (24 - 29)  SpO2: 98% (18 Jun 2022 10:38) (94% - 100%)    Constitutional: chronically ill appearing, Mod acute distress  Eyes: Conjunctiva pink, Sclera clear,  EOMI.  Cardiovascular: S1 and S2, Tachy regular rate, regular rhythm,  Respiratory: tachypneic with b/l rhonchi no wheezing  Gastrointestinal: soft, non-tender abdomen, no pulsatile mass, normal bowl sounds  Rectal: done by the ER staff :  brown stool  Musculoskeletal: supple neck, no lower extremity edema, no midline tenderness  Integumentary: warm, dry, no rash  Neurologic: awake, alert, cranial nerves II-XII grossly intact, extremities’ motor and sensory functions grossly intact  Psychiatric: appropriate mood, appropriate affect Vital Signs Last 24 Hrs  T(C): 38.5 (18 Jun 2022 10:38), Max: 40.2 (18 Jun 2022 07:20)  T(F): 101.3 (18 Jun 2022 10:38), Max: 104.4 (18 Jun 2022 07:20)  HR: 104 (18 Jun 2022 10:38) (100 - 118)  BP: 140/60 (18 Jun 2022 10:38) (95/54 - 143/71)  RR: 24 (18 Jun 2022 10:38) (24 - 29)  SpO2: 98% (18 Jun 2022 10:38) (94% - 100%)    Constitutional: chronically ill appearing, Min acute distress on NC o2   Eyes: Conjunctiva pink, Sclera clear,  EOMI.  Cardiovascular: S1 and S2, Tachy regular rate, regular rhythm,  Respiratory: tachypneic with b/l rhonchi no wheezing  Gastrointestinal: soft, non-tender abdomen, no pulsatile mass, normal bowl sounds  Rectal: done by the ER staff :  brown stool  Musculoskeletal: supple neck, no lower extremity edema, TTP to LEFT KNEE -medial aspect DROM due to pain   Integumentary: , dry, no rash B/L COLD LE , faint pulses of DP/PT , femoral pulse palp  Neurologic: awake, alert, cranial nerves II-XII grossly intact, extremities’ motor and sensory functions grossly intact  Psychiatric: appropriate mood, appropriate affect

## 2022-06-18 NOTE — ED PROVIDER NOTE - CRITICAL CARE ATTENDING CONTRIBUTION TO CARE
SEEN WITH PA  77y female above PMH BIBEMS for fever/cough/sob, also abd pain with constipation, on arrival tachypneic and hypoxic with T104, conj pink dry mm neck supple cor tachy, reg, lungs with dim bs at bases with scattered rhonchi, abd +bs, soft with mild suprpaubic ttp no g/r, calves nontender neuro nonfocal, placed on bipap, given fluids and abx with improvement in VS, labs and studies reviewed, weaned off bipap onto NC, HD stable for floor

## 2022-06-18 NOTE — H&P ADULT - NSHPLABSRESULTS_GEN_ALL_CORE
06-18    142  |  103  |  32<H>  ----------------------------<  122<H>  3.9   |  26  |  1.2    Ca    10.8<H>      18 Jun 2022 08:14  Mg     1.9     06-18    TPro  7.7  /  Alb  4.5  /  TBili  0.4  /  DBili  x   /  AST  52<H>  /  ALT  26  /  AlkPhos  306<H>  06-18                            11.2   10.60 )-----------( 285      ( 18 Jun 2022 08:14 )             36.0     < from: CT Abdomen and Pelvis No Cont (06.18.22 @ 11:19) >      IMPRESSION:    1. Right lower lobe patchy opacification. Findings may represent   pneumonia in the appropriate clinical setting.    2. No evidence of acute/inflammatory process within the abdomen or pelvis.    < end of copied text >            CARDIAC MARKERS ( 18 Jun 2022 08:14 )  x     / <0.01 ng/mL / x     / x     / x 06-18    142  |  103  |  32<H>  ----------------------------<  122<H>  3.9   |  26  |  1.2    Ca    10.8<H>      18 Jun 2022 08:14  Mg     1.9     06-18    TPro  7.7  /  Alb  4.5  /  TBili  0.4  /  DBili  x   /  AST  52<H>  /  ALT  26  /  AlkPhos  306<H>  06-18                            11.2   10.60 )-----------( 285      ( 18 Jun 2022 08:14 )             36.0     < from: CT Abdomen and Pelvis No Cont (06.18.22 @ 11:19) >      IMPRESSION:    1. Right lower lobe patchy opacification. Findings may represent   pneumonia in the appropriate clinical setting.    2. No evidence of acute/inflammatory process within the abdomen or pelvis.    < end of copied text >        CARDIAC MARKERS ( 18 Jun 2022 08:14 )  x     / <0.01 ng/mL / x     / x     / x

## 2022-06-18 NOTE — ED PROVIDER NOTE - PHYSICAL EXAMINATION
Physical Exam    Vital Signs: I have reviewed the initial vital signs.  Constitutional: chronically ill appearing, Mod acute distress  Eyes: Conjunctiva pink, Sclera clear, PERRLA, EOMI.  Cardiovascular: S1 and S2, Tachy regular rate, regular rhythm, well-perfused extremities, radial pulses equal and 2+  Respiratory: tachypnic with b/l rhonchi no wheezing  Gastrointestinal: soft, non-tender abdomen, no pulsatile mass, normal bowl sounds  Rectal brown stool  Musculoskeletal: supple neck, no lower extremity edema, no midline tenderness  Integumentary: warm, dry, no rash  Neurologic: awake, alert, cranial nerves II-XII grossly intact, extremities’ motor and sensory functions grossly intact  Psychiatric: appropriate mood, appropriate affect

## 2022-06-18 NOTE — CONSULT NOTE ADULT - SUBJECTIVE AND OBJECTIVE BOX
77-year-old female FROM HOME  with past medical history atrial fibrillation on Eliquis, TIA x 3, HTN, HLD, COPD on 4 L nasal cannula, septic shock October 2021, NSTEMI, status post catheterization showing non-obstructive coronary artery disease presents to the ED with coughing and shortness of breath. Pt states she has been coughing and started to have generalized abdominal pain over the last day and this am became shortness of breath. no /chills. no nausea, vomiting, diarrhea.  HAD TEMP of 102 in ER     pt was admitted last month for UTI , cx + for GNR kleb , also noted to have appendicolith but no indication for surgery at that time.     while in ER : NIV / iv abx / IVF initiated     vasc:  Since the pt has been in the ER, new c/o left knee pain , denies trauma / fall   pt uses walker and ambulates daily   upon physical exam pt found to have b/l cold LE     PAST MEDICAL & SURGICAL HISTORY:  Hypertension      Depression      COPD (chronic obstructive pulmonary disease)      Other cardiomyopathy      Other emphysema      PVD (peripheral vascular disease)      Bipolar 1 disorder      FLAVIO on CPAP      Transient ischemic attack  x 3      Congestive heart failure      Falls      2019 novel coronavirus disease (COVID-19)      Respiratory failure requiring intubation      Afib      HLD (hyperlipidemia)      History of cholecystectomy      H/O carotid angioplasty            Allergies    codeine (Other (Moderate))  Depakote (Unknown)  Dilaudid (Short breath; Rash)  IV Contrast (Anaphylaxis)  losartan (Angioedema)  Risperdal (Other)  verapamil (Short breath; Angioedema)    Review of Systems: Constitutional: (-) fever  Eyes/ENT: (-) blurry vision, (-) epistaxis  Cardiovascular: (-) chest pain, (-) syncope  Respiratory: (+) cough, (+) shortness of breath  Gastrointestinal: (-) vomiting, (-) diarrhea  Musculoskeletal: (-) neck pain, (-) back pain, (-) joint pain  Integumentary: (-) rash, (-) edema  Neurological: (-) headache, (-) altered mental status  Psychiatric: (-) hallucinations  Allergic/Immunologic: (-) pruritus      T(C): 38.5 (18 Jun 2022 10:38), Max: 40.2 (18 Jun 2022 07:20)  T(F): 101.3 (18 Jun 2022 10:38), Max: 104.4 (18 Jun 2022 07:20)  HR: 104 (18 Jun 2022 10:38) (100 - 118)  BP: 140/60 (18 Jun 2022 10:38) (95/54 - 143/71)  RR: 24 (18 Jun 2022 10:38) (24 - 29)  SpO2: 98% (18 Jun 2022 10:38) (94% - 100%)    Constitutional: chronically ill appearing, Min acute distress on NC o2   Eyes: Conjunctiva pink, Sclera clear,  EOMI.  Cardiovascular: S1 and S2, Tachy regular rate, regular rhythm,  Respiratory: tachypneic with b/l rhonchi no wheezing  Gastrointestinal: soft, non-tender abdomen, no pulsatile mass, normal bowl sounds  Rectal: done by the ER staff :  brown stool  Musculoskeletal: supple neck, no lower extremity edema, TTP to LEFT KNEE -medial aspect DROM due to pain   Integumentary: , dry, no rash B/L COLD LE , faint pulses of DP/PT , femoral pulse palp  Neurologic: awake, alert, cranial nerves II-XII grossly intact, extremities’ motor and sensory functions grossly intact  Psychiatric: appropriate mood, appropriate affect      06-18    142  |  103  |  32<H>  ----------------------------<  122<H>  3.9   |  26  |  1.2    Ca    10.8<H>      18 Jun 2022 08:14  Mg     1.9     06-18    TPro  7.7  /  Alb  4.5  /  TBili  0.4  /  DBili  x   /  AST  52<H>  /  ALT  26  /  AlkPhos  306<H>  06-18                            11.2   10.60 )-----------( 285      ( 18 Jun 2022 08:14 )             36.0     CAPILLARY BLOOD GLUCOSE        CARDIAC MARKERS ( 18 Jun 2022 08:14 )  x     / <0.01 ng/mL / x     / x     / x        < from: CT Abdomen and Pelvis No Cont (06.18.22 @ 11:19) >  IMPRESSION:    1. Right lower lobe patchy opacification. Findings may represent   pneumonia in the appropriate clinical setting.    2. No evidence of acute/inflammatory process within the abdomen or pelvis.    < end of copied text >

## 2022-06-18 NOTE — ED PROVIDER NOTE - CLINICAL SUMMARY MEDICAL DECISION MAKING FREE TEXT BOX
77y female above PMH BIBEMS for fever/cough/sob, also abd pain with constipation, on arrival tachypneic and hypoxic with T104, conj pink dry mm neck supple cor tachy, reg, lungs with dim bs at bases with scattered rhonchi, abd +bs, soft with mild suprpaubic ttp no g/r, calves nontender neuro nonfocal, placed on bipap, given fluids and abx with improvement in VS, labs and studies reviewed, weaned off bipap onto NC, HD stable for floor

## 2022-06-18 NOTE — ED PROVIDER NOTE - OBJECTIVE STATEMENT
77-year-old female with past medical history atrial fibrillation on Eliquis, TIA x 3, HTN, HLD, COPD on 4 L nasal cannula, septic shock October 2021, NSTEMI, status post catheterization showing non-obstructive coronary artery disease, mild SFA disease presents to the ED with coughing and shortness of breath. Pt states she has been coughing and started to have generalized abdominal pain over the last day and this am became shortness of breath. no fever/chills. no nausea, vomiting, diarrhea.

## 2022-06-18 NOTE — H&P ADULT - ASSESSMENT
77-year-old female FROM HOME  with past medical history atrial fibrillation on Eliquis, TIA x 3, HTN, HLD, COPD on 4 L nasal cannula, septic shock October 2021, NSTEMI, status post catheterization showing non-obstructive coronary artery disease presents to the ED with coughing and shortness of breath/ FEVER 102     77-year-old female FROM HOME  with past medical history atrial fibrillation on Eliquis, TIA x 3, HTN, HLD, COPD on 4 L nasal cannula, septic shock October 2021, NSTEMI, status post catheterization showing non-obstructive coronary artery disease presents to the ED with coughing and shortness of breath/ FEVER 102    #PNA  #JOYCE creat 1.2  #CKD3a  # r/o UTI   - CTAP:   Right lower lobe patchy opacification.   - UA: LE+ nitrite +, pyuria and bacteria  - IV ABX: cefepime / vanco  - f/u bld and Ucx  - IVF 1 liter in ER will lock for now   - tylenol prn fever  - ID consult   - urine for strep/ leg   - remove stephens cath     # LT calf/ knee tenderness   # B/L LE coldness r/o arterial insufficiency  - xray of left leg   - arterial doppler ordered ( called sono tech )   - on eliquis for afib     #HTN  - c/w norvasc    #h/o of multiple TIA  #h/o of chronic persistent afib  - c/w metoprolol and Eliquis and statin  - TTE (10/21/21): EF 55-60%, mod pHTN, no valvular or wall motion abn    #COPD  #FLAVIO  - continue Albuterol MDI, Singular, Tiotropium  - c/w cpap as needed  - O2 nc 4L ( home dose )  - duoneb q6   - family to bring in inhalers that we do not have on form.    #depression  continue Seroquel    #HLD  - continue Statin    #OA  - tylenol prn pain 1-3    d/w ramone porter    77-year-old female FROM HOME  with past medical history atrial fibrillation on Eliquis, TIA x 3, HTN, HLD, COPD on 4 L nasal cannula, septic shock October 2021, NSTEMI, status post catheterization showing non-obstructive coronary artery disease presents to the ED with coughing and shortness of breath/ FEVER 102    - Admit to inpatient level of care   - Ambulate w/ assistance  - AM labs  - PT consult   - CHG bath  - DASH diet   - VTE ppx: on eliquis  - GI ppx: protonix      #PNA / sepsis ( tachycardia / fever/ leukocytosis )   #JOYCE creat 1.2  #CKD3a  # r/o UTI   - CTAP:   Right lower lobe patchy opacification.   - UA: LE+ nitrite +, pyuria and bacteria  - IV ABX: cefepime / vanco  - f/u bld and Ucx  - IVF 1 liter in ER will lock for now   - tylenol prn fever  - ID consult   - urine for strep/ leg   - remove stephens cath     # LT calf/ knee tenderness   # B/L LE coldness r/o arterial insufficiency  - xray of left leg   - arterial doppler ordered ( called sono tech )   - on eliquis for afib     #HTN  - c/w norvasc    #h/o of multiple TIA  #h/o of chronic persistent afib  - c/w metoprolol and Eliquis and statin  - TTE (10/21/21): EF 55-60%, mod pHTN, no valvular or wall motion abn    #COPD  #FLAVIO  - continue Albuterol MDI, Singular, Tiotropium  - c/w cpap as needed  - O2 nc 4L ( home dose )  - duoneb q6   - family to bring in inhalers that we do not have on form. ( breo / incruse )     #depression  continue Seroquel    #HLD  - continue Statin    #OA  - tylenol prn pain 1-3    d/w dr. verac    77-year-old female FROM HOME  with past medical history atrial fibrillation on Eliquis, TIA x 3, HTN, HLD, COPD on 4 L nasal cannula, septic shock October 2021, NSTEMI, status post catheterization showing non-obstructive coronary artery disease presents to the ED with coughing and shortness of breath/ FEVER 102    - Admit to inpatient level of care   - Ambulate w/ assistance  - AM labs  - PT consult   - CHG bath  - DASH diet   - VTE ppx: on eliquis  - GI ppx: protonix      #PNA / sepsis ( tachycardia / fever/ leukocytosis )   #JOYCE creat 1.2  #CKD3a  # r/o UTI   - CTAP:   Right lower lobe patchy opacification.   - UA: LE+ nitrite +, pyuria and bacteria  - IV ABX: cefepime / vanco  - f/u bld and Ucx  - IVF 1 liter in ER will lock for now   - tylenol prn fever  - ID consult   - urine for strep/ leg   - remove stephens cath     # LT calf/ knee tenderness   # B/L LE coldness r/o arterial insufficiency  - xray of left leg   - arterial doppler ordered ( called sono tech )   - on eliquis for afib   - CTA LE as per vascular fellow    #HTN  - c/w norvasc    #h/o of multiple TIA  #h/o of chronic persistent afib  - c/w metoprolol and Eliquis and statin  - TTE (10/21/21): EF 55-60%, mod pHTN, no valvular or wall motion abn    #COPD  #FLAVIO  - continue Albuterol MDI, Singular, Tiotropium  - c/w cpap as needed  - O2 nc 4L ( home dose )  - duoneb q6   - family to bring in inhalers that we do not have on form. ( breo / incruse )     #depression  continue Seroquel    #HLD  - continue Statin    #OA  - tylenol prn pain 1-3

## 2022-06-18 NOTE — ED ADULT NURSE NOTE - NSIMPLEMENTINTERV_GEN_ALL_ED
Implemented All Fall with Harm Risk Interventions:  Star to call system. Call bell, personal items and telephone within reach. Instruct patient to call for assistance. Room bathroom lighting operational. Non-slip footwear when patient is off stretcher. Physically safe environment: no spills, clutter or unnecessary equipment. Stretcher in lowest position, wheels locked, appropriate side rails in place. Provide visual cue, wrist band, yellow gown, etc. Monitor gait and stability. Monitor for mental status changes and reorient to person, place, and time. Review medications for side effects contributing to fall risk. Reinforce activity limits and safety measures with patient and family. Provide visual clues: red socks.

## 2022-06-19 NOTE — PHYSICAL THERAPY INITIAL EVALUATION ADULT - ADDITIONAL COMMENTS
Per patient, she lives with  in private jael with 2 steps with (R) rail going up to enter home, 10 steps with (R) rail going down to basement, has rollator which patient uses when outside; has O2 at home

## 2022-06-19 NOTE — PROGRESS NOTE ADULT - SUBJECTIVE AND OBJECTIVE BOX
77-year-old female from home with past medical history atrial fibrillation on Eliquis, TIA x 3, HTN, HLD, COPD on 4 L nasal cannula, septic shock 2021, NSTEMI, status post catheterization showing non-obstructive coronary artery disease presents to the ED with coughing and shortness of breath/ FEVER 102    Today:  Seen at bedside, no new complaints.        REVIEW OF SYSTEMS:        MEDICATIONS  (STANDING):  albuterol/ipratropium for Nebulization 3 milliLiter(s) Nebulizer every 6 hours  amLODIPine   Tablet 2.5 milliGRAM(s) Oral daily  apixaban 5 milliGRAM(s) Oral every 12 hours  aspirin  chewable 81 milliGRAM(s) Oral daily  atorvastatin 20 milliGRAM(s) Oral at bedtime  cefepime   IVPB 1000 milliGRAM(s) IV Intermittent every 12 hours  chlorhexidine 4% Liquid 1 Application(s) Topical <User Schedule>  furosemide    Tablet 40 milliGRAM(s) Oral daily  gabapentin 400 milliGRAM(s) Oral two times a day  lamoTRIgine 100 milliGRAM(s) Oral two times a day  loratadine 10 milliGRAM(s) Oral daily  metoprolol tartrate 75 milliGRAM(s) Oral two times a day  montelukast 10 milliGRAM(s) Oral daily  pantoprazole    Tablet 40 milliGRAM(s) Oral before breakfast  polyethylene glycol 3350 17 Gram(s) Oral daily  QUEtiapine 200 milliGRAM(s) Oral daily  senna 2 Tablet(s) Oral at bedtime  sodium chloride 0.9% lock flush 3 milliLiter(s) IV Push every 8 hours    MEDICATIONS  (PRN):  acetaminophen     Tablet .. 650 milliGRAM(s) Oral every 6 hours PRN Mild Pain (1 - 3)  bisacodyl 5 milliGRAM(s) Oral every 12 hours PRN Constipation  LORazepam     Tablet 0.5 milliGRAM(s) Oral two times a day PRN Agitation      Allergies  codeine (Other (Moderate))  Depakote (Unknown)  Dilaudid (Short breath; Rash)  IV Contrast (Anaphylaxis)  losartan (Angioedema)  Risperdal (Other)  verapamil (Short breath; Angioedema)      FAMILY HISTORY:  FH: heart disease (Father, Mother)        Vital Signs Last 24 Hrs  T(C): 37.4 (2022 05:00), Max: 37.9 (2022 21:12)  T(F): 99.3 (2022 05:00), Max: 100.2 (2022 21:12)  HR: 89 (2022 05:00) (77 - 105)  BP: 138/63 (2022 05:00) (123/60 - 157/74)  RR: 19 (2022 05:00) (19 - 20)  SpO2: 93% (2022 23:44) (93% - 97%)    PHYSICAL EXAM:  Constitutional: chronically ill appearing, Min acute distress on NC o2   Eyes: Conjunctiva pink, Sclera clear,  EOMI.  Cardiovascular: S1 and S2, Tachy regular rate, regular rhythm,  Respiratory: tachypneic with b/l rhonchi no wheezing  Gastrointestinal: soft, non-tender abdomen, no pulsatile mass, normal bowl sounds  Musculoskeletal: supple neck, no lower extremity edema, TTP to LEFT KNEE -medial aspect DROM due to pain   Integumentary: , dry, no rash B/L COLD LE , faint pulses of DP/PT , femoral pulse palp  Neurologic: awake, alert, O x 3  Psychiatric: appropriate mood, appropriate affect      LABS:                        10.7   10.66 )-----------( 253      ( 2022 08:42 )             34.1     06-19    134<L>  |  98  |  22<H>  ----------------------------<  132<H>  4.1   |  19  |  1.1    Ca    9.6      2022 08:42  Mg     1.9     06-19    TPro  7.7  /  Alb  4.5  /  TBili  0.4  /  DBili  x   /  AST  52<H>  /  ALT  26  /  AlkPhos  306<H>  06-18    PT/INR - ( 2022 08:14 )   PT: 17.60 sec;   INR: 1.54 ratio         PTT - ( 2022 08:14 )  PTT:36.5 sec  Urinalysis Basic - ( 2022 08:28 )    Color: Yellow / Appearance: Clear / S.020 / pH: x  Gluc: x / Ketone: Negative  / Bili: Negative / Urobili: 0.2 mg/dL   Blood: x / Protein: 30 mg/dL / Nitrite: Positive   Leuk Esterase: Small / RBC: 11-25 /HPF / WBC 10-25 /HPF   Sq Epi: x / Non Sq Epi: Few /HPF / Bacteria: Many

## 2022-06-19 NOTE — CONSULT NOTE ADULT - SUBJECTIVE AND OBJECTIVE BOX
SHAUN COATES  77y, Female  Allergy: codeine (Other (Moderate))  Depakote (Unknown)  Dilaudid (Short breath; Rash)  IV Contrast (Anaphylaxis)  losartan (Angioedema)  Risperdal (Other)  verapamil (Short breath; Angioedema)      CHIEF COMPLAINT:   sob (2022 16:53)      LOS  1d    HPI  HPI:  77-year-old female FROM HOME  with past medical history atrial fibrillation on Eliquis, TIA x 3, HTN, HLD, COPD on 4 L nasal cannula, septic shock 2021, NSTEMI, status post catheterization showing non-obstructive coronary artery disease presents to the ED with coughing and shortness of breath. Pt states she has been coughing and started to have generalized abdominal pain over the last day and this am became shortness of breath. no /chills. no nausea, vomiting, diarrhea.  HAD TEMP of 102 in ER     pt was admitted last month for UTI , cx + for GNR kleb , also noted to have appendicolith but no indication for surgery at that time.     while in ER : NIV / iv abx / IVF initiated     Since the pt has been in the ER, new c/o left knee pain , denies trauma / fall   pt uses walker and ambulates daily  (2022 12:17)      INFECTIOUS DISEASE HISTORY:  ID consulted for fever  CTAP with RLL opacities   on cefepime    PMH  PAST MEDICAL & SURGICAL HISTORY:  Hypertension      Depression      COPD (chronic obstructive pulmonary disease)      Other cardiomyopathy      Other emphysema      PVD (peripheral vascular disease)      Bipolar 1 disorder      FLAVIO on CPAP      Transient ischemic attack  x 3      Congestive heart failure      Falls      2019 novel coronavirus disease (COVID-19)      Respiratory failure requiring intubation      Afib      HLD (hyperlipidemia)      History of cholecystectomy      H/O carotid angioplasty          FAMILY HISTORY  No pertinent family history in first degree relatives    FH: heart disease (Father, Mother)        SOCIAL HISTORY  Social History:  Tobacco use: x 30 yrs, 2 PPD, stopped 30 yrs ago  EtOH use: No  Illicit drug use: No  Marital Status:     Uses walker at times (18 May 2022 17:31)        ROS  ***    VITALS:  T(F): 99.3, Max: 102.6 (22 @ 08:45)  HR: 89  BP: 138/63  RR: 19Vital Signs Last 24 Hrs  T(C): 37.4 (2022 05:00), Max: 39.2 (2022 08:45)  T(F): 99.3 (2022 05:00), Max: 102.6 (2022 08:45)  HR: 89 (2022 05:00) (77 - 106)  BP: 138/63 (2022 05:00) (115/60 - 157/74)  BP(mean): --  RR: 19 (2022 05:00) (19 - 29)  SpO2: 93% (2022 23:44) (93% - 100%)    PHYSICAL EXAM:  ***    TESTS & MEASUREMENTS:                        11.2   10.60 )-----------( 285      ( 2022 08:14 )             36.0         142  |  103  |  32<H>  ----------------------------<  122<H>  3.9   |  26  |  1.2    Ca    10.8<H>      2022 08:14  Mg     1.9         TPro  7.7  /  Alb  4.5  /  TBili  0.4  /  DBili  x   /  AST  52<H>  /  ALT  26  /  AlkPhos  306<H>        LIVER FUNCTIONS - ( 2022 08:14 )  Alb: 4.5 g/dL / Pro: 7.7 g/dL / ALK PHOS: 306 U/L / ALT: 26 U/L / AST: 52 U/L / GGT: x           Urinalysis Basic - ( 2022 08:28 )    Color: Yellow / Appearance: Clear / S.020 / pH: x  Gluc: x / Ketone: Negative  / Bili: Negative / Urobili: 0.2 mg/dL   Blood: x / Protein: 30 mg/dL / Nitrite: Positive   Leuk Esterase: Small / RBC: 11-25 /HPF / WBC 10-25 /HPF   Sq Epi: x / Non Sq Epi: Few /HPF / Bacteria: Many        Culture - Blood (collected 22 @ 18:50)  Source: .Blood Blood-Venous  Final Report (22 @ 01:01):    No Growth Final    Culture - Urine (collected 22 @ 13:30)  Source: Clean Catch Clean Catch (Midstream)  Final Report (22 @ 22:04):    >100,000 CFU/ml Klebsiella pneumoniae  Organism: Klebsiella pneumoniae (22 @ 22:04)  Organism: Klebsiella pneumoniae (22 @ 22:04)      -  Amikacin: S <=16      -  Amoxicillin/Clavulanic Acid: S <=8/4      -  Ampicillin: R >16 These ampicillin results predict results for amoxicillin      -  Ampicillin/Sulbactam: S <=4/2 Enterobacter, Klebsiella aerogenes, Citrobacter, and Serratia may develop resistance during prolonged therapy (3-4 days)      -  Aztreonam: S <=4      -  Cefazolin: S <=2 (MIC_CL_COM_ENTERIC_CEFAZU) For uncomplicated UTI with K. pneumoniae, E. coli, or P. mirablis: MARCELLO <=16 is sensitive and MARCELLO >=32 is resistant. This also predicts results for oral agents cefaclor, cefdinir, cefpodoxime, cefprozil, cefuroxime axetil, cephalexin and locarbef for uncomplicated UTI. Note that some isolates may be susceptible to these agents while testing resistant to cefazolin.      -  Cefepime: S <=2      -  Cefoxitin: S <=8      -  Ceftriaxone: S <=1 Enterobacter, Klebsiella aerogenes, Citrobacter, and Serratia may develop resistance during prolonged therapy      -  Ciprofloxacin: S <=0.25      -  Ertapenem: S <=0.5      -  Gentamicin: S <=2      -  Imipenem: S <=1      -  Levofloxacin: S <=0.5      -  Meropenem: S <=1      -  Nitrofurantoin: I 64 Should not be used to treat pyelonephritis      -  Piperacillin/Tazobactam: S <=8      -  Tigecycline: S <=2      -  Tobramycin: S <=2      -  Trimethoprim/Sulfamethoxazole: S <=0.5/9.5      Method Type: MARCELLO    Culture - Urine (collected 10-21-21 @ 14:44)  Source: Clean Catch Clean Catch (Midstream)  Final Report (10-25-21 @ 11:01):    10,000 - 49,000 CFU/mL Escherichia coli  Organism: Escherichia coli (10-25-21 @ 11:01)  Organism: Escherichia coli (10-25-21 @ 11:01)      -  Amikacin: S <=16      -  Amoxicillin/Clavulanic Acid: S <=8/4      -  Ampicillin: S <=8 These ampicillin results predict results for amoxicillin      -  Ampicillin/Sulbactam: S <=4/2 Enterobacter, Citrobacter, and Serratia may develop resistance during prolonged therapy (3-4 days)      -  Aztreonam: S <=4      -  Cefazolin: S <=2 (MIC_CL_COM_ENTERIC_CEFAZU) For uncomplicated UTI with K. pneumoniae, E. coli, or P. mirablis: MARCELLO <=16 is sensitive and MARCELLO >=32 is resistant. This also predicts results for oral agents cefaclor, cefdinir, cefpodoxime, cefprozil, cefuroxime axetil, cephalexin and locarbef for uncomplicated UTI. Note that some isolates may be susceptible to these agents while testing resistant to cefazolin.      -  Cefepime: S <=2      -  Cefoxitin: S <=8      -  Ceftriaxone: S <=1 Enterobacter, Citrobacter, and Serratia may develop resistance during prolonged therapy      -  Ciprofloxacin: R >2      -  Ertapenem: S <=0.5      -  Gentamicin: S <=2      -  Imipenem: S <=1      -  Levofloxacin: R >4      -  Meropenem: S <=1      -  Nitrofurantoin: S <=32 Should not be used to treat pyelonephritis      -  Piperacillin/Tazobactam: S <=8      -  Tigecycline: S <=2      -  Tobramycin: S <=2      -  Trimethoprim/Sulfamethoxazole: S <=0.5/9.5      Method Type: MARCELLO    Culture - Blood (collected 10-21-21 @ 13:56)  Source: .Blood Blood  Final Report (10-26-21 @ 22:01):    No Growth Final        Blood Gas Venous - Lactate: 1.50 mmol/L (22 @ 07:39)      INFECTIOUS DISEASES TESTING  COVID-19 PCR: NotDetec (22 @ 10:00)  Legionella Antigen, Urine: Negative (10-25-21 @ 21:04)  Rapid RVP Result: NotDetec (10-22-21 @ 13:45)  Procalcitonin, Serum: 0.80 ng/mL (10-22-21 @ 04:45)  COVID-19 PCR: NotDetec (10-21-21 @ 13:30)      INFLAMMATORY MARKERS      RADIOLOGY & ADDITIONAL TESTS:  I have personally reviewed the last Chest xray  CXR      CT  CT Abdomen and Pelvis No Cont:   ACC: [id number] EXAM:  CT ABDOMEN AND PELVIS                          PROCEDURE DATE:  2022          INTERPRETATION:  CLINICAL HISTORY: Abdominal pain. Fever.    TECHNIQUE: Contiguous axial CT images were obtained from the lower chest   to the pubic symphysis without intravenous contrast. Oral contrast was   not administered. Reformatted images in the coronal and sagittal planes   were acquired.    COMPARISON: CT ABDOMEN/PELVIS 2022.    FINDINGS:      LOWER CHEST: Right lower lobe patchy opacity.    HEPATOBILIARY: Post cholecystectomy.    SPLEEN: Unremarkable.    PANCREAS: Unremarkable.    ADRENAL GLANDS: Unremarkable.    KIDNEYS: No hydronephrosis. Stable bilateral renal cysts.    ABDOMINOPELVIC NODES: No lymphadenopathy.    PELVIC ORGANS: A Freeman catheter is within a decompressed urinary bladder.   Air is present within the urinary bladder consistent with instrumentation.    PERITONEUM/MESENTERY/BOWEL: No bowel obstruction, ascites or   intraperitoneal free air. Copious stoolwithin the rectum without   evidence of stercoral colitis. Unchanged normal caliber appendix with   numerous appendicoliths.    BONES/SOFT TISSUES: Degenerative change of the spine. Small   fat-containing umbilical hernia.    OTHER: Atherosclerotic calcification.    IMPRESSION:    1. Right lower lobe patchy opacification. Findings may represent   pneumonia in the appropriate clinical setting.    2. No evidence of acute/inflammatory process within the abdomen or pelvis.    --- End of Report ---          CEDRIC GILMAN MD; Resident Radiologist  This document has been electronically signed.  APURVA RAY MD; Attending Radiologist  This document has been electronically signed. 2022 11:29AM (22 @ 11:19)      CARDIOLOGY TESTING  12 Lead ECG:   Ventricular Rate 113 BPM    Atrial Rate 113 BPM    P-R Interval 152 ms    QRS Duration 84 ms    Q-T Interval 322 ms    QTC Calculation(Bazett) 441 ms    P Axis 51 degrees    R Axis 26 degrees    T Axis 47 degrees    Diagnosis Line Sinus tachycardia  Possible Left atrial enlargement  Borderline ECG    Confirmed by VIVIENNE HAQUE MD ([id number]) on 2022 9:02:05 AM (22 @ 07:18)      MEDICATIONS  albuterol/ipratropium for Nebulization 3 Nebulizer every 6 hours  amLODIPine   Tablet 2.5 Oral daily  apixaban 5 Oral every 12 hours  aspirin  chewable 81 Oral daily  atorvastatin 20 Oral at bedtime  cefepime   IVPB 1000 IV Intermittent every 12 hours  chlorhexidine 4% Liquid 1 Topical <User Schedule>  furosemide    Tablet 40 Oral daily  gabapentin 400 Oral two times a day  lamoTRIgine 100 Oral two times a day  loratadine 10 Oral daily  metoprolol tartrate 75 Oral two times a day  montelukast 10 Oral daily  pantoprazole    Tablet 40 Oral before breakfast  polyethylene glycol 3350 17 Oral daily  QUEtiapine 200 Oral daily  senna 2 Oral at bedtime  sodium chloride 0.9% lock flush 3 IV Push every 8 hours  vancomycin  IVPB 500 IV Intermittent every 12 hours        ANTIBIOTICS:  cefepime   IVPB 1000 milliGRAM(s) IV Intermittent every 12 hours  vancomycin  IVPB 500 milliGRAM(s) IV Intermittent every 12 hours      ALLERGIES:  codeine (Other (Moderate))  Depakote (Unknown)  Dilaudid (Short breath; Rash)  IV Contrast (Anaphylaxis)  losartan (Angioedema)  Risperdal (Other)  verapamil (Short breath; Angioedema)           SHAUN COATES  77y, Female  Allergy: codeine (Other (Moderate))  Depakote (Unknown)  Dilaudid (Short breath; Rash)  IV Contrast (Anaphylaxis)  losartan (Angioedema)  Risperdal (Other)  verapamil (Short breath; Angioedema)      CHIEF COMPLAINT:   sob (2022 16:53)      LOS  1d    HPI  HPI:  77-year-old female FROM HOME  with past medical history atrial fibrillation on Eliquis, TIA x 3, HTN, HLD, COPD on 4 L nasal cannula, septic shock 2021, NSTEMI, status post catheterization showing non-obstructive coronary artery disease presents to the ED with coughing and shortness of breath. Pt states she has been coughing and started to have generalized abdominal pain over the last day and this am became shortness of breath. no /chills. no nausea, vomiting, diarrhea.  HAD TEMP of 102 in ER     pt was admitted last month for UTI , cx + for GNR kleb , also noted to have appendicolith but no indication for surgery at that time.     while in ER : NIV / iv abx / IVF initiated     Since the pt has been in the ER, new c/o left knee pain , denies trauma / fall   pt uses walker and ambulates daily  (2022 12:17)      INFECTIOUS DISEASE HISTORY:  ID consulted for fever  Pt reports she attending a graduation party. No one was wearing masks. Covid vaccinated x2. Then developed dry cough and fever  She also reports dysuria   RVP NEGATIVE   CTAP with RLL opacities   on cefepime    PMH  PAST MEDICAL & SURGICAL HISTORY:  Hypertension      Depression      COPD (chronic obstructive pulmonary disease)      Other cardiomyopathy      Other emphysema      PVD (peripheral vascular disease)      Bipolar 1 disorder      FLAVIO on CPAP      Transient ischemic attack  x 3      Congestive heart failure      Falls      2019 novel coronavirus disease (COVID-19)      Respiratory failure requiring intubation      Afib      HLD (hyperlipidemia)      History of cholecystectomy      H/O carotid angioplasty          FAMILY HISTORY  No pertinent family history in first degree relatives    FH: heart disease (Father, Mother)        SOCIAL HISTORY  Social History:  Tobacco use: x 30 yrs, 2 PPD, stopped 30 yrs ago  EtOH use: No  Illicit drug use: No  Marital Status:     Uses walker at times (18 May 2022 17:31)        ROS  General: Denies rigors, nightsweats  HEENT: Denies headache, rhinorrhea, sore throat, eye pain  CV: Denies CP, palpitations  PULM: as noted above   GI: Denies hematemesis, hematochezia, melena  : as noted above   MSK: Denies arthralgias, myalgias  SKIN: Denies rash, lesions  NEURO: Denies paresthesias, weakness  PSYCH: Denies depression, anxiety     VITALS:  T(F): 99.3, Max: 102.6 (22 @ 08:45)  HR: 89  BP: 138/63  RR: 19Vital Signs Last 24 Hrs  T(C): 37.4 (2022 05:00), Max: 39.2 (2022 08:45)  T(F): 99.3 (2022 05:00), Max: 102.6 (2022 08:45)  HR: 89 (2022 05:00) (77 - 106)  BP: 138/63 (2022 05:00) (115/60 - 157/74)  BP(mean): --  RR: 19 (2022 05:00) (19 - 29)  SpO2: 93% (2022 23:44) (93% - 100%)    PHYSICAL EXAM:  Gen: NAD, in the chair NC  HEENT: Normocephalic, atraumatic  Neck: supple, no lymphadenopathy  CV: Regular rate & regular rhythm  Lungs: decreased BS at bases, no fremitus coarse BS  Abdomen: Soft, BS present no suprapubic tenderness, no CVAT   Ext: Warm, well perfused  Neuro: non focal, awake  Skin: no rash, no erythema  Lines: no phlebitis     TESTS & MEASUREMENTS:                        11.2   10.60 )-----------( 285      ( 2022 08:14 )             36.0         142  |  103  |  32<H>  ----------------------------<  122<H>  3.9   |  26  |  1.2    Ca    10.8<H>      2022 08:14  Mg     1.9         TPro  7.7  /  Alb  4.5  /  TBili  0.4  /  DBili  x   /  AST  52<H>  /  ALT  26  /  AlkPhos  306<H>        LIVER FUNCTIONS - ( 2022 08:14 )  Alb: 4.5 g/dL / Pro: 7.7 g/dL / ALK PHOS: 306 U/L / ALT: 26 U/L / AST: 52 U/L / GGT: x           Urinalysis Basic - ( 2022 08:28 )    Color: Yellow / Appearance: Clear / S.020 / pH: x  Gluc: x / Ketone: Negative  / Bili: Negative / Urobili: 0.2 mg/dL   Blood: x / Protein: 30 mg/dL / Nitrite: Positive   Leuk Esterase: Small / RBC: 11-25 /HPF / WBC 10-25 /HPF   Sq Epi: x / Non Sq Epi: Few /HPF / Bacteria: Many        Culture - Blood (collected 22 @ 18:50)  Source: .Blood Blood-Venous  Final Report (22 @ 01:01):    No Growth Final    Culture - Urine (collected 22 @ 13:30)  Source: Clean Catch Clean Catch (Midstream)  Final Report (22 @ 22:04):    >100,000 CFU/ml Klebsiella pneumoniae  Organism: Klebsiella pneumoniae (22 @ 22:04)  Organism: Klebsiella pneumoniae (22 @ 22:04)      -  Amikacin: S <=16      -  Amoxicillin/Clavulanic Acid: S <=8/4      -  Ampicillin: R >16 These ampicillin results predict results for amoxicillin      -  Ampicillin/Sulbactam: S <=4/2 Enterobacter, Klebsiella aerogenes, Citrobacter, and Serratia may develop resistance during prolonged therapy (3-4 days)      -  Aztreonam: S <=4      -  Cefazolin: S <=2 (MIC_CL_COM_ENTERIC_CEFAZU) For uncomplicated UTI with K. pneumoniae, E. coli, or P. mirablis: MARCELLO <=16 is sensitive and MARCELLO >=32 is resistant. This also predicts results for oral agents cefaclor, cefdinir, cefpodoxime, cefprozil, cefuroxime axetil, cephalexin and locarbef for uncomplicated UTI. Note that some isolates may be susceptible to these agents while testing resistant to cefazolin.      -  Cefepime: S <=2      -  Cefoxitin: S <=8      -  Ceftriaxone: S <=1 Enterobacter, Klebsiella aerogenes, Citrobacter, and Serratia may develop resistance during prolonged therapy      -  Ciprofloxacin: S <=0.25      -  Ertapenem: S <=0.5      -  Gentamicin: S <=2      -  Imipenem: S <=1      -  Levofloxacin: S <=0.5      -  Meropenem: S <=1      -  Nitrofurantoin: I 64 Should not be used to treat pyelonephritis      -  Piperacillin/Tazobactam: S <=8      -  Tigecycline: S <=2      -  Tobramycin: S <=2      -  Trimethoprim/Sulfamethoxazole: S <=0.5/9.5      Method Type: MARCELLO    Culture - Urine (collected 10-21-21 @ 14:44)  Source: Clean Catch Clean Catch (Midstream)  Final Report (10-25-21 @ 11:01):    10,000 - 49,000 CFU/mL Escherichia coli  Organism: Escherichia coli (10-25-21 @ 11:01)  Organism: Escherichia coli (10-25-21 @ 11:01)      -  Amikacin: S <=16      -  Amoxicillin/Clavulanic Acid: S <=8/4      -  Ampicillin: S <=8 These ampicillin results predict results for amoxicillin      -  Ampicillin/Sulbactam: S <=4/2 Enterobacter, Citrobacter, and Serratia may develop resistance during prolonged therapy (3-4 days)      -  Aztreonam: S <=4      -  Cefazolin: S <=2 (MIC_CL_COM_ENTERIC_CEFAZU) For uncomplicated UTI with K. pneumoniae, E. coli, or P. mirablis: MARCELLO <=16 is sensitive and MARCELLO >=32 is resistant. This also predicts results for oral agents cefaclor, cefdinir, cefpodoxime, cefprozil, cefuroxime axetil, cephalexin and locarbef for uncomplicated UTI. Note that some isolates may be susceptible to these agents while testing resistant to cefazolin.      -  Cefepime: S <=2      -  Cefoxitin: S <=8      -  Ceftriaxone: S <=1 Enterobacter, Citrobacter, and Serratia may develop resistance during prolonged therapy      -  Ciprofloxacin: R >2      -  Ertapenem: S <=0.5      -  Gentamicin: S <=2      -  Imipenem: S <=1      -  Levofloxacin: R >4      -  Meropenem: S <=1      -  Nitrofurantoin: S <=32 Should not be used to treat pyelonephritis      -  Piperacillin/Tazobactam: S <=8      -  Tigecycline: S <=2      -  Tobramycin: S <=2      -  Trimethoprim/Sulfamethoxazole: S <=0.5/9.5      Method Type: MARCELLO    Culture - Blood (collected 10-21-21 @ 13:56)  Source: .Blood Blood  Final Report (10-26-21 @ 22:01):    No Growth Final        Blood Gas Venous - Lactate: 1.50 mmol/L (22 @ 07:39)      INFECTIOUS DISEASES TESTING  COVID-19 PCR: NotDetec (22 @ 10:00)  Legionella Antigen, Urine: Negative (10-25-21 @ 21:04)  Rapid RVP Result: NotDetec (10-22-21 @ 13:45)  Procalcitonin, Serum: 0.80 ng/mL (10-22-21 @ 04:45)  COVID-19 PCR: NotDetec (10-21-21 @ 13:30)      INFLAMMATORY MARKERS      RADIOLOGY & ADDITIONAL TESTS:  I have personally reviewed the last Chest xray  CXR      CT  CT Abdomen and Pelvis No Cont:   ACC: [id number] EXAM:  CT ABDOMEN AND PELVIS                          PROCEDURE DATE:  2022          INTERPRETATION:  CLINICAL HISTORY: Abdominal pain. Fever.    TECHNIQUE: Contiguous axial CT images were obtained from the lower chest   to the pubic symphysis without intravenous contrast. Oral contrast was   not administered. Reformatted images in the coronal and sagittal planes   were acquired.    COMPARISON: CT ABDOMEN/PELVIS 2022.    FINDINGS:      LOWER CHEST: Right lower lobe patchy opacity.    HEPATOBILIARY: Post cholecystectomy.    SPLEEN: Unremarkable.    PANCREAS: Unremarkable.    ADRENAL GLANDS: Unremarkable.    KIDNEYS: No hydronephrosis. Stable bilateral renal cysts.    ABDOMINOPELVIC NODES: No lymphadenopathy.    PELVIC ORGANS: A Freeman catheter is within a decompressed urinary bladder.   Air is present within the urinary bladder consistent with instrumentation.    PERITONEUM/MESENTERY/BOWEL: No bowel obstruction, ascites or   intraperitoneal free air. Copious stoolwithin the rectum without   evidence of stercoral colitis. Unchanged normal caliber appendix with   numerous appendicoliths.    BONES/SOFT TISSUES: Degenerative change of the spine. Small   fat-containing umbilical hernia.    OTHER: Atherosclerotic calcification.    IMPRESSION:    1. Right lower lobe patchy opacification. Findings may represent   pneumonia in the appropriate clinical setting.    2. No evidence of acute/inflammatory process within the abdomen or pelvis.    --- End of Report ---          CEDRIC GILMAN MD; Resident Radiologist  This document has been electronically signed.  APURVA RAY MD; Attending Radiologist  This document has been electronically signed. 2022 11:29AM (22 @ 11:19)      CARDIOLOGY TESTING  12 Lead ECG:   Ventricular Rate 113 BPM    Atrial Rate 113 BPM    P-R Interval 152 ms    QRS Duration 84 ms    Q-T Interval 322 ms    QTC Calculation(Bazett) 441 ms    P Axis 51 degrees    R Axis 26 degrees    T Axis 47 degrees    Diagnosis Line Sinus tachycardia  Possible Left atrial enlargement  Borderline ECG    Confirmed by VIVIENNE HAQUE MD ([id number]) on 2022 9:02:05 AM (22 @ 07:18)      MEDICATIONS  albuterol/ipratropium for Nebulization 3 Nebulizer every 6 hours  amLODIPine   Tablet 2.5 Oral daily  apixaban 5 Oral every 12 hours  aspirin  chewable 81 Oral daily  atorvastatin 20 Oral at bedtime  cefepime   IVPB 1000 IV Intermittent every 12 hours  chlorhexidine 4% Liquid 1 Topical <User Schedule>  furosemide    Tablet 40 Oral daily  gabapentin 400 Oral two times a day  lamoTRIgine 100 Oral two times a day  loratadine 10 Oral daily  metoprolol tartrate 75 Oral two times a day  montelukast 10 Oral daily  pantoprazole    Tablet 40 Oral before breakfast  polyethylene glycol 3350 17 Oral daily  QUEtiapine 200 Oral daily  senna 2 Oral at bedtime  sodium chloride 0.9% lock flush 3 IV Push every 8 hours  vancomycin  IVPB 500 IV Intermittent every 12 hours        ANTIBIOTICS:  cefepime   IVPB 1000 milliGRAM(s) IV Intermittent every 12 hours  vancomycin  IVPB 500 milliGRAM(s) IV Intermittent every 12 hours      ALLERGIES:  codeine (Other (Moderate))  Depakote (Unknown)  Dilaudid (Short breath; Rash)  IV Contrast (Anaphylaxis)  losartan (Angioedema)  Risperdal (Other)  verapamil (Short breath; Angioedema)

## 2022-06-19 NOTE — CONSULT NOTE ADULT - ASSESSMENT
ASSESSMENT  77-year-old female FROM HOME  with past medical history atrial fibrillation on Eliquis, TIA x 3, HTN, HLD, COPD on 4 L nasal cannula, septic shock October 2021, NSTEMI, status post catheterization showing non-obstructive coronary artery disease presents to the ED with coughing and shortness of breath.       IMPRESSION  #  #Sepsis on admissionT>101F, Pulse>90    UA WBC 10-25    5/18 UCX  >100,000 CFU/ml Klebsiella pneumoniae    CT 1. Right lower lobe patchy opacification. Findings may represent   pneumonia in the appropriate clinical setting.    2. No evidence of acute/inflammatory process within the abdomen or pelvis.  #Elevated AP  #MOrbid Obesity BMI (kg/m2): 35.1  #COPD  #Abx allergy: none  Creatinine, Serum: 1.2 (06-18-22 @ 08:14)    Weight (kg): 87 (06-18-22 @ 13:32)    RECOMMENDATIONS  This is an incomplete consult note. All final recommendations to follow after interview and examination of the patient. Please follow recommendations noted below.    If any questions, please call or send a message on [employer] Teams  Please continue to update ID with any pertinent new laboratory or radiographic findings  Spectra [id number] ASSESSMENT  77-year-old female FROM HOME  with past medical history atrial fibrillation on Eliquis, TIA x 3, HTN, HLD, COPD on 4 L nasal cannula, septic shock October 2021, NSTEMI, status post catheterization showing non-obstructive coronary artery disease presents to the ED with coughing and shortness of breath.       IMPRESSION  #Sepsis on admissionT>101F, Pulse>90    Suspected viral PNA and symptomatic UTI /acute cystitis    UA WBC 10-25    5/18 UCX  >100,000 CFU/ml Klebsiella pneumoniae    CT 1. Right lower lobe patchy opacification. Findings may represent   pneumonia in the appropriate clinical setting.  2. No evidence of acute/inflammatory process within the abdomen or pelvis.  #Elevated AP  #MOrbid Obesity BMI (kg/m2): 35.1  #COPD  #Abx allergy: none  Creatinine, Serum: 1.2 (06-18-22 @ 08:14)    Weight (kg): 87 (06-18-22 @ 13:32)    RECOMMENDATIONS  - f/u UCX, BCX  - SEND expanded RVP  - Cont cefepime 1g q12h IV  - D/C VANCO      If any questions, please call or send a message on [employer] Teams  Please continue to update ID with any pertinent new laboratory or radiographic findings  Spectra [id number]

## 2022-06-19 NOTE — PHYSICAL THERAPY INITIAL EVALUATION ADULT - GENERAL OBSERVATIONS, REHAB EVAL
08:30-09:00 Chart reviewed. Pt encountered sitting in chair, may be seen by Physical Therapist as confirmed with Nurse. Patient denied pain at rest but feels "weak and tired"; +O2 via NC'; heplock MARKOS Never smoker

## 2022-06-19 NOTE — PHYSICAL THERAPY INITIAL EVALUATION ADULT - LEVEL OF INDEPENDENCE: STAIR NEGOTIATION, REHAB EVAL
secondary to easy fatigabiliy/SOB with short ambulation on level with rolling walker and O2 via NC/unable to perform

## 2022-06-20 NOTE — PROGRESS NOTE ADULT - SUBJECTIVE AND OBJECTIVE BOX
77-year-old female from home with past medical history atrial fibrillation on Eliquis, TIA x 3, HTN, HLD, COPD on 4 L nasal cannula, septic shock October 2021, NSTEMI, status post catheterization showing non-obstructive coronary artery disease presents to the ED with coughing and shortness of breath/ FEVER 102    Today:  Seen at bedside, complains of left knee pain.              REVIEW OF SYSTEMS:  Left knee pain      MEDICATIONS  (STANDING):  albuterol/ipratropium for Nebulization 3 milliLiter(s) Nebulizer every 6 hours  amLODIPine   Tablet 2.5 milliGRAM(s) Oral daily  apixaban 5 milliGRAM(s) Oral every 12 hours  aspirin  chewable 81 milliGRAM(s) Oral daily  atorvastatin 20 milliGRAM(s) Oral at bedtime  cefepime   IVPB 1000 milliGRAM(s) IV Intermittent every 12 hours  chlorhexidine 4% Liquid 1 Application(s) Topical <User Schedule>  furosemide    Tablet 40 milliGRAM(s) Oral daily  gabapentin 400 milliGRAM(s) Oral two times a day  lamoTRIgine 100 milliGRAM(s) Oral two times a day  loratadine 10 milliGRAM(s) Oral daily  metoprolol tartrate 75 milliGRAM(s) Oral two times a day  montelukast 10 milliGRAM(s) Oral daily  pantoprazole    Tablet 40 milliGRAM(s) Oral before breakfast  polyethylene glycol 3350 17 Gram(s) Oral daily  QUEtiapine 200 milliGRAM(s) Oral daily  senna 2 Tablet(s) Oral at bedtime  sodium chloride 0.9% lock flush 3 milliLiter(s) IV Push every 8 hours    MEDICATIONS  (PRN):  acetaminophen     Tablet .. 650 milliGRAM(s) Oral every 6 hours PRN Mild Pain (1 - 3)  bisacodyl 5 milliGRAM(s) Oral every 12 hours PRN Constipation  LORazepam     Tablet 0.5 milliGRAM(s) Oral two times a day PRN Agitation      Allergies  codeine (Other (Moderate))  Depakote (Unknown)  Dilaudid (Short breath; Rash)  IV Contrast (Anaphylaxis)  losartan (Angioedema)  Risperdal (Other)  verapamil (Short breath; Angioedema)      FAMILY HISTORY:  FH: heart disease (Father, Mother)        Vital Signs Last 24 Hrs  T(C): 36.6 (20 Jun 2022 05:00), Max: 36.9 (19 Jun 2022 12:48)  T(F): 97.8 (20 Jun 2022 05:00), Max: 98.5 (19 Jun 2022 12:48)  HR: 86 (20 Jun 2022 05:00) (72 - 94)  BP: 121/56 (20 Jun 2022 05:00) (88/52 - 121/56)  RR: 18 (20 Jun 2022 05:00) (16 - 18)  SpO2: 96% (19 Jun 2022 21:00) (96% - 96%)    PHYSICAL EXAM:  Constitutional: chronically ill appearing, Min acute distress on NC o2   Eyes: Conjunctiva pink, Sclera clear,  EOMI.  Cardiovascular: S1 and S2, Tachy regular rate, regular rhythm,  Respiratory: tachypneic with b/l rhonchi no wheezing  Gastrointestinal: soft, non-tender abdomen, no pulsatile mass, normal bowl sounds  Musculoskeletal: supple neck, no lower extremity edema, TTP to LEFT KNEE -medial aspect DROM due to pain   Integumentary: , dry, no rash B/L COLD LE , faint pulses of DP/PT , femoral pulse palp  Neurologic: awake, alert, O x 3  Psychiatric: appropriate mood, appropriate affect    LABS:                        10.0   8.76  )-----------( 228      ( 20 Jun 2022 07:25 )             33.0     06-20    140  |  102  |  28<H>  ----------------------------<  87  3.5   |  23  |  1.1    Ca    9.2      20 Jun 2022 07:25  Mg     1.9     06-19    TPro  6.7  /  Alb  3.7  /  TBili  0.3  /  DBili  x   /  AST  25  /  ALT  15  /  AlkPhos  212<H>  06-20

## 2022-06-20 NOTE — PROGRESS NOTE ADULT - SUBJECTIVE AND OBJECTIVE BOX
SHAUN COATES  77y, Female  Allergy: codeine (Other (Moderate))  Depakote (Unknown)  Dilaudid (Short breath; Rash)  IV Contrast (Anaphylaxis)  losartan (Angioedema)  Risperdal (Other)  verapamil (Short breath; Angioedema)      LOS  2d    CHIEF COMPLAINT: sob (20 Jun 2022 11:15)      INTERVAL EVENTS/HPI  - No acute events overnight  - T(F): , Max: 98.2 (06-20-22 @ 13:55)  - Denies any worsening symptoms  - Tolerating medication  - WBC Count: 8.76 (06-20-22 @ 07:25)  WBC Count: 10.66 (06-19-22 @ 08:42)     - Creatinine, Serum: 1.1 (06-20-22 @ 07:25)  Creatinine, Serum: 1.1 (06-19-22 @ 08:42)       ROS  General: Denies rigors, nightsweats  HEENT: Denies headache, rhinorrhea, sore throat, eye pain  CV: Denies CP, palpitations  PULM: Denies wheezing, hemoptysis  GI: Denies hematemesis, hematochezia, melena  : Denies discharge, hematuria  MSK: Denies arthralgias, myalgias  SKIN: Denies rash, lesions  NEURO: Denies paresthesias, weakness  PSYCH: Denies depression, anxiety    VITALS:  T(F): 98.2, Max: 98.2 (06-20-22 @ 13:55)  HR: 80  BP: 118/55  RR: 18Vital Signs Last 24 Hrs  T(C): 36.8 (20 Jun 2022 13:55), Max: 36.8 (20 Jun 2022 13:55)  T(F): 98.2 (20 Jun 2022 13:55), Max: 98.2 (20 Jun 2022 13:55)  HR: 80 (20 Jun 2022 13:55) (72 - 86)  BP: 118/55 (20 Jun 2022 13:55) (88/52 - 121/56)  BP(mean): --  RR: 18 (20 Jun 2022 13:55) (17 - 18)  SpO2: 96% (19 Jun 2022 21:00) (96% - 96%)    PHYSICAL EXAM:  Gen: NAD, resting in bed  HEENT: Normocephalic, atraumatic  Neck: supple, no lymphadenopathy  CV: Regular rate & regular rhythm  Lungs: decreased BS at bases, no fremitus  Abdomen: Soft, BS present  Ext: Warm, well perfused  Neuro: non focal, awake  Skin: no rash, no erythema  Lines: no phlebitis    FH: Non-contributory  Social Hx: Non-contributory    TESTS & MEASUREMENTS:                        10.0   8.76  )-----------( 228      ( 20 Jun 2022 07:25 )             33.0     06-20    140  |  102  |  28<H>  ----------------------------<  87  3.5   |  23  |  1.1    Ca    9.2      20 Jun 2022 07:25  Mg     1.9     06-19    TPro  6.7  /  Alb  3.7  /  TBili  0.3  /  DBili  x   /  AST  25  /  ALT  15  /  AlkPhos  212<H>  06-20      LIVER FUNCTIONS - ( 20 Jun 2022 07:25 )  Alb: 3.7 g/dL / Pro: 6.7 g/dL / ALK PHOS: 212 U/L / ALT: 15 U/L / AST: 25 U/L / GGT: x               Culture - Urine (collected 06-18-22 @ 08:28)  Source: Clean Catch Clean Catch (Midstream)  Preliminary Report (06-19-22 @ 23:35):    >100,000 CFU/ml Escherichia coli    Culture - Blood (collected 06-18-22 @ 08:22)  Source: .Blood Blood-Peripheral  Preliminary Report (06-20-22 @ 01:02):    No growth to date.    Culture - Blood (collected 06-18-22 @ 08:20)  Source: .Blood Blood-Peripheral  Preliminary Report (06-20-22 @ 01:02):    No growth to date.        Blood Gas Venous - Lactate: 1.50 mmol/L (06-18-22 @ 07:39)      INFECTIOUS DISEASES TESTING  COVID-19 PCR: NotDetec (05-18-22 @ 10:00)  Legionella Antigen, Urine: Negative (10-25-21 @ 21:04)  Rapid RVP Result: NotDetec (10-22-21 @ 13:45)  Procalcitonin, Serum: 0.80 (10-22-21 @ 04:45)  COVID-19 PCR: NotDete (10-21-21 @ 13:30)      INFLAMMATORY MARKERS      RADIOLOGY & ADDITIONAL TESTS:  I have personally reviewed the last available Chest xray  CXR      CT  CT Abdomen and Pelvis No Cont:   ACC: [id number] EXAM:  CT ABDOMEN AND PELVIS                          PROCEDURE DATE:  06/18/2022          INTERPRETATION:  CLINICAL HISTORY: Abdominal pain. Fever.    TECHNIQUE: Contiguous axial CT images were obtained from the lower chest   to the pubic symphysis without intravenous contrast. Oral contrast was   not administered. Reformatted images in the coronal and sagittal planes   were acquired.    COMPARISON: CT ABDOMEN/PELVIS 5/19/2022.    FINDINGS:      LOWER CHEST: Right lower lobe patchy opacity.    HEPATOBILIARY: Post cholecystectomy.    SPLEEN: Unremarkable.    PANCREAS: Unremarkable.    ADRENAL GLANDS: Unremarkable.    KIDNEYS: No hydronephrosis. Stable bilateral renal cysts.    ABDOMINOPELVIC NODES: No lymphadenopathy.    PELVIC ORGANS: A Freeman catheter is within a decompressed urinary bladder.   Air is present within the urinary bladder consistent with instrumentation.    PERITONEUM/MESENTERY/BOWEL: No bowel obstruction, ascites or   intraperitoneal free air. Copious stoolwithin the rectum without   evidence of stercoral colitis. Unchanged normal caliber appendix with   numerous appendicoliths.    BONES/SOFT TISSUES: Degenerative change of the spine. Small   fat-containing umbilical hernia.    OTHER: Atherosclerotic calcification.    IMPRESSION:    1. Right lower lobe patchy opacification. Findings may represent   pneumonia in the appropriate clinical setting.    2. No evidence of acute/inflammatory process within the abdomen or pelvis.    --- End of Report ---          CEDRIC GILMAN MD; Resident Radiologist  This document has been electronically signed.  APURVA RAY MD; Attending Radiologist  This document has been electronically signed. Jun 18 2022 11:29AM (06-18-22 @ 11:19)      CARDIOLOGY TESTING  12 Lead ECG:   Ventricular Rate 113 BPM    Atrial Rate 113 BPM    P-R Interval 152 ms    QRS Duration 84 ms    Q-T Interval 322 ms    QTC Calculation(Bazett) 441 ms    P Axis 51 degrees    R Axis 26 degrees    T Axis 47 degrees    Diagnosis Line Sinus tachycardia  Possible Left atrial enlargement  Borderline ECG    Confirmed by VIVIENNE HAQUE MD ([id number]) on 6/18/2022 9:02:05 AM (06-18-22 @ 07:18)      MEDICATIONS  albuterol/ipratropium for Nebulization 3 Nebulizer every 6 hours  amLODIPine   Tablet 2.5 Oral daily  apixaban 5 Oral every 12 hours  aspirin  chewable 81 Oral daily  atorvastatin 20 Oral at bedtime  cefepime   IVPB 1000 IV Intermittent every 12 hours  chlorhexidine 4% Liquid 1 Topical <User Schedule>  furosemide    Tablet 40 Oral daily  gabapentin 400 Oral two times a day  lamoTRIgine 100 Oral two times a day  loratadine 10 Oral daily  metoprolol tartrate 75 Oral two times a day  montelukast 10 Oral daily  pantoprazole    Tablet 40 Oral before breakfast  polyethylene glycol 3350 17 Oral daily  QUEtiapine 200 Oral daily  senna 2 Oral at bedtime  sodium chloride 0.9% lock flush 3 IV Push every 8 hours      WEIGHT  Weight (kg): 87 (06-18-22 @ 13:32)  Creatinine, Serum: 1.1 mg/dL (06-20-22 @ 07:25)      ANTIBIOTICS:  cefepime   IVPB 1000 milliGRAM(s) IV Intermittent every 12 hours      All available historical records have been reviewed

## 2022-06-21 NOTE — PROGRESS NOTE ADULT - SUBJECTIVE AND OBJECTIVE BOX
SHAUN COATES  77y, Female  Allergy: codeine (Other (Moderate))  Depakote (Unknown)  Dilaudid (Short breath; Rash)  IV Contrast (Anaphylaxis)  losartan (Angioedema)  Risperdal (Other)  verapamil (Short breath; Angioedema)      LOS  3d    CHIEF COMPLAINT: sob (21 Jun 2022 13:34)      INTERVAL EVENTS/HPI  - No acute events overnight  - T(F): , Max: 98.6 (06-21-22 @ 05:57)  - Denies any worsening symptoms  - Tolerating medication  - WBC Count: 8.76 (06-20-22 @ 07:25)  WBC Count: 10.66 (06-19-22 @ 08:42)     - Creatinine, Serum: 1.1 (06-20-22 @ 07:25)       ROS  General: Denies rigors, nightsweats  HEENT: Denies headache, rhinorrhea, sore throat, eye pain  CV: Denies CP, palpitations  PULM: Denies wheezing, hemoptysis  GI: Denies hematemesis, hematochezia, melena  : Denies discharge, hematuria  MSK: Denies arthralgias, myalgias  SKIN: Denies rash, lesions  NEURO: Denies paresthesias, weakness  PSYCH: Denies depression, anxiety    VITALS:  T(F): 98.6, Max: 98.6 (06-21-22 @ 05:57)  HR: 81  BP: 112/67  RR: 18Vital Signs Last 24 Hrs  T(C): 37 (21 Jun 2022 05:57), Max: 37 (21 Jun 2022 05:57)  T(F): 98.6 (21 Jun 2022 05:57), Max: 98.6 (21 Jun 2022 05:57)  HR: 81 (21 Jun 2022 05:57) (81 - 92)  BP: 112/67 (21 Jun 2022 05:57) (102/66 - 112/67)  BP(mean): --  RR: 18 (21 Jun 2022 05:57) (18 - 18)  SpO2: --    PHYSICAL EXAM:  Gen: NAD, resting in bed  HEENT: Normocephalic, atraumatic  Neck: supple, no lymphadenopathy  CV: Regular rate & regular rhythm  Lungs: decreased BS at bases, no fremitus  Abdomen: Soft, BS present  Ext: Warm, well perfused  Neuro: non focal, awake  Skin: no rash, no erythema  Lines: no phlebitis    FH: Non-contributory  Social Hx: Non-contributory    TESTS & MEASUREMENTS:                        10.0   8.76  )-----------( 228      ( 20 Jun 2022 07:25 )             33.0     06-20    140  |  102  |  28<H>  ----------------------------<  87  3.5   |  23  |  1.1    Ca    9.2      20 Jun 2022 07:25    TPro  6.7  /  Alb  3.7  /  TBili  0.3  /  DBili  x   /  AST  25  /  ALT  15  /  AlkPhos  212<H>  06-20      LIVER FUNCTIONS - ( 20 Jun 2022 07:25 )  Alb: 3.7 g/dL / Pro: 6.7 g/dL / ALK PHOS: 212 U/L / ALT: 15 U/L / AST: 25 U/L / GGT: x               Culture - Urine (collected 06-18-22 @ 08:28)  Source: Clean Catch Clean Catch (Midstream)  Final Report (06-21-22 @ 11:06):    >100,000 CFU/ml Escherichia coli  Organism: Escherichia coli (06-21-22 @ 11:06)  Organism: Escherichia coli (06-21-22 @ 11:06)      -  Amikacin: S <=16      -  Amoxicillin/Clavulanic Acid: S <=8/4      -  Ampicillin: S <=8 These ampicillin results predict results for amoxicillin      -  Ampicillin/Sulbactam: S <=4/2 Enterobacter, Klebsiella aerogenes, Citrobacter, and Serratia may develop resistance during prolonged therapy (3-4 days)      -  Aztreonam: S <=4      -  Cefazolin: S <=2 (MIC_CL_COM_ENTERIC_CEFAZU) For uncomplicated UTI with K. pneumoniae, E. coli, or P. mirablis: MARCELLO <=16 is sensitive and MARCELLO >=32 is resistant. This also predicts results for oral agents cefaclor, cefdinir, cefpodoxime, cefprozil, cefuroxime axetil, cephalexin and locarbef for uncomplicated UTI. Note that some isolates may be susceptible to these agents while testing resistant to cefazolin.      -  Cefepime: S <=2      -  Cefoxitin: S <=8      -  Ceftriaxone: S <=1 Enterobacter, Klebsiella aerogenes, Citrobacter, and Serratia may develop resistance during prolonged therapy      -  Ciprofloxacin: R >2      -  Ertapenem: S <=0.5      -  Gentamicin: S <=2      -  Imipenem: S <=1      -  Levofloxacin: R >4      -  Meropenem: S <=1      -  Nitrofurantoin: S <=32 Should not be used to treat pyelonephritis      -  Piperacillin/Tazobactam: S <=8      -  Tigecycline: S <=2      -  Tobramycin: S <=2      -  Trimethoprim/Sulfamethoxazole: S <=0.5/9.5      Method Type: MARCELLO    Culture - Blood (collected 06-18-22 @ 08:22)  Source: .Blood Blood-Peripheral  Preliminary Report (06-20-22 @ 01:02):    No growth to date.    Culture - Blood (collected 06-18-22 @ 08:20)  Source: .Blood Blood-Peripheral  Preliminary Report (06-20-22 @ 01:02):    No growth to date.        Blood Gas Venous - Lactate: 1.50 mmol/L (06-18-22 @ 07:39)      INFECTIOUS DISEASES TESTING  MRSA PCR Result.: Negative (06-20-22 @ 15:45)  Rapid RVP Result: NotDetec (06-19-22 @ 23:10)  COVID-19 PCR: NotDetec (05-18-22 @ 10:00)  Legionella Antigen, Urine: Negative (10-25-21 @ 21:04)  Rapid RVP Result: NotDetec (10-22-21 @ 13:45)  Procalcitonin, Serum: 0.80 (10-22-21 @ 04:45)  COVID-19 PCR: NotDetec (10-21-21 @ 13:30)      INFLAMMATORY MARKERS      RADIOLOGY & ADDITIONAL TESTS:  I have personally reviewed the last available Chest xray  CXR      CT      CARDIOLOGY TESTING  12 Lead ECG:   Ventricular Rate 113 BPM    Atrial Rate 113 BPM    P-R Interval 152 ms    QRS Duration 84 ms    Q-T Interval 322 ms    QTC Calculation(Bazett) 441 ms    P Axis 51 degrees    R Axis 26 degrees    T Axis 47 degrees    Diagnosis Line Sinus tachycardia  Possible Left atrial enlargement  Borderline ECG    Confirmed by VIVIENNE HAQUE MD ([id number]) on 6/18/2022 9:02:05 AM (06-18-22 @ 07:18)      MEDICATIONS  albuterol/ipratropium for Nebulization 3 Nebulizer every 6 hours  amLODIPine   Tablet 2.5 Oral daily  apixaban 5 Oral every 12 hours  aspirin  chewable 81 Oral daily  atorvastatin 20 Oral at bedtime  cefepime   IVPB 1000 IV Intermittent every 12 hours  chlorhexidine 4% Liquid 1 Topical <User Schedule>  furosemide    Tablet 40 Oral daily  gabapentin 400 Oral two times a day  lamoTRIgine 100 Oral two times a day  loratadine 10 Oral daily  metoprolol tartrate 75 Oral two times a day  montelukast 10 Oral daily  pantoprazole    Tablet 40 Oral before breakfast  polyethylene glycol 3350 17 Oral daily  QUEtiapine 200 Oral daily  senna 2 Oral at bedtime  sodium chloride 0.9% lock flush 3 IV Push every 8 hours      WEIGHT  Weight (kg): 87 (06-18-22 @ 13:32)      ANTIBIOTICS:  cefepime   IVPB 1000 milliGRAM(s) IV Intermittent every 12 hours      All available historical records have been reviewed

## 2022-06-21 NOTE — CHART NOTE - NSCHARTNOTEFT_GEN_A_CORE
Pt states she takes quetiapine 250mg QHS as per her psychiatrist Dr Ruiz. Will change order to reflect pts correct dose and time.

## 2022-06-21 NOTE — PROGRESS NOTE ADULT - SUBJECTIVE AND OBJECTIVE BOX
77-year-old female from home with past medical history atrial fibrillation on Eliquis, TIA x 3, HTN, HLD, COPD on 4 L nasal cannula, septic shock October 2021, NSTEMI, status post catheterization showing non-obstructive coronary artery disease presents to the ED with coughing and shortness of breath/ FEVER 102    Today:  Seen at bedside, no new complaints.          REVIEW OF SYSTEMS:  No new complaints      MEDICATIONS  (STANDING):  albuterol/ipratropium for Nebulization 3 milliLiter(s) Nebulizer every 6 hours  amLODIPine   Tablet 2.5 milliGRAM(s) Oral daily  apixaban 5 milliGRAM(s) Oral every 12 hours  aspirin  chewable 81 milliGRAM(s) Oral daily  atorvastatin 20 milliGRAM(s) Oral at bedtime  cefepime   IVPB 1000 milliGRAM(s) IV Intermittent every 12 hours  chlorhexidine 4% Liquid 1 Application(s) Topical <User Schedule>  furosemide    Tablet 40 milliGRAM(s) Oral daily  gabapentin 400 milliGRAM(s) Oral two times a day  lamoTRIgine 100 milliGRAM(s) Oral two times a day  loratadine 10 milliGRAM(s) Oral daily  metoprolol tartrate 75 milliGRAM(s) Oral two times a day  montelukast 10 milliGRAM(s) Oral daily  pantoprazole    Tablet 40 milliGRAM(s) Oral before breakfast  polyethylene glycol 3350 17 Gram(s) Oral daily  QUEtiapine 200 milliGRAM(s) Oral daily  senna 2 Tablet(s) Oral at bedtime  sodium chloride 0.9% lock flush 3 milliLiter(s) IV Push every 8 hours    MEDICATIONS  (PRN):  acetaminophen     Tablet .. 650 milliGRAM(s) Oral every 6 hours PRN Mild Pain (1 - 3)  bisacodyl 5 milliGRAM(s) Oral every 12 hours PRN Constipation  LORazepam     Tablet 0.5 milliGRAM(s) Oral two times a day PRN Agitation      Allergies  codeine (Other (Moderate))  Depakote (Unknown)  Dilaudid (Short breath; Rash)  IV Contrast (Anaphylaxis)  losartan (Angioedema)  Risperdal (Other)  verapamil (Short breath; Angioedema)        FAMILY HISTORY:  FH: heart disease (Father, Mother)        Vital Signs Last 24 Hrs  T(C): 37 (21 Jun 2022 05:57), Max: 37 (21 Jun 2022 05:57)  T(F): 98.6 (21 Jun 2022 05:57), Max: 98.6 (21 Jun 2022 05:57)  HR: 81 (21 Jun 2022 05:57) (80 - 92)  BP: 112/67 (21 Jun 2022 05:57) (102/66 - 118/55)  RR: 18 (21 Jun 2022 05:57) (18 - 18)      PHYSICAL EXAM:  Constitutional: chronically ill appearing, Min acute distress on NC o2   Eyes: Conjunctiva pink, Sclera clear,  EOMI.  Cardiovascular: S1 and S2, Tachy regular rate, regular rhythm,  Respiratory: tachypneic with b/l rhonchi no wheezing  Gastrointestinal: soft, non-tender abdomen, no pulsatile mass, normal bowl sounds  Musculoskeletal: supple neck, no lower extremity edema, TTP to LEFT KNEE -medial aspect DROM due to pain   Integumentary: , dry, no rash B/L COLD LE , faint pulses of DP/PT , femoral pulse palp  Neurologic: awake, alert, O x 3  Psychiatric: appropriate mood, appropriate affect      LABS:                        10.0   8.76  )-----------( 228      ( 20 Jun 2022 07:25 )             33.0     06-20    140  |  102  |  28<H>  ----------------------------<  87  3.5   |  23  |  1.1    Ca    9.2      20 Jun 2022 07:25    TPro  6.7  /  Alb  3.7  /  TBili  0.3  /  DBili  x   /  AST  25  /  ALT  15  /  AlkPhos  212<H>  06-20

## 2022-06-22 NOTE — PROGRESS NOTE ADULT - SUBJECTIVE AND OBJECTIVE BOX
SHAUN COATES  77y, Female  Allergy: codeine (Other (Moderate))  Depakote (Unknown)  Dilaudid (Short breath; Rash)  IV Contrast (Anaphylaxis)  losartan (Angioedema)  Risperdal (Other)  verapamil (Short breath; Angioedema)    Hospital Day: 4d    Patient seen and examined earlier today. she stated that she feels much better, she stated that she came to the ED for sob and dry cough , later in the day she was noticed to have b/l fingers cold and bluish/ coloration - she stated that this happens to her when her hands ar cold , no pain or discomfort in her hands and usually it disapear by itself on warming the hands, later this is improved    PMH/PSH:  PAST MEDICAL & SURGICAL HISTORY:  Hypertension      Depression      COPD (chronic obstructive pulmonary disease)      Other cardiomyopathy      Other emphysema      PVD (peripheral vascular disease)      Bipolar 1 disorder      FLAVIO on CPAP      Transient ischemic attack  x 3      Congestive heart failure      Falls      2019 novel coronavirus disease (COVID-19)      Respiratory failure requiring intubation      Afib      HLD (hyperlipidemia)      History of cholecystectomy      H/O carotid angioplasty          LAST 24-Hr EVENTS:    VITALS:  T(F): 97.2 (06-22-22 @ 14:05), Max: 97.4 (06-22-22 @ 04:43)  HR: 82 (06-22-22 @ 14:05)  BP: 132/60 (06-22-22 @ 14:05) (97/61 - 133/60)  RR: 18 (06-22-22 @ 06:11)  SpO2: 97% (06-22-22 @ 07:18)          TESTS & MEASUREMENTS:  Weight/BMI  87 (06-18-22 @ 13:32)  35.1 (06-18-22 @ 13:32)        INR: 1.54 ratio (06-18-22 @ 08:14)                  Culture - Urine (collected 06-18-22 @ 08:28)  Source: Clean Catch Clean Catch (Midstream)  Final Report (06-21-22 @ 11:06):    >100,000 CFU/ml Escherichia coli  Organism: Escherichia coli (06-21-22 @ 11:06)  Organism: Escherichia coli (06-21-22 @ 11:06)      -  Amikacin: S <=16      -  Amoxicillin/Clavulanic Acid: S <=8/4      -  Ampicillin: S <=8 These ampicillin results predict results for amoxicillin      -  Ampicillin/Sulbactam: S <=4/2 Enterobacter, Klebsiella aerogenes, Citrobacter, and Serratia may develop resistance during prolonged therapy (3-4 days)      -  Aztreonam: S <=4      -  Cefazolin: S <=2 ([id number]) For uncomplicated UTI with K. pneumoniae, E. coli, or P. mirablis: MARCELLO <=16 is sensitive and MARCELLO >=32 is resistant. This also predicts results for oral agents cefaclor, cefdinir, cefpodoxime, cefprozil, cefuroxime axetil, cephalexin and locarbef for uncomplicated UTI. Note that some isolates may be susceptible to these agents while testing resistant to cefazolin.      -  Cefepime: S <=2      -  Cefoxitin: S <=8      -  Ceftriaxone: S <=1 Enterobacter, Klebsiella aerogenes, Citrobacter, and Serratia may develop resistance during prolonged therapy      -  Ciprofloxacin: R >2      -  Ertapenem: S <=0.5      -  Gentamicin: S <=2      -  Imipenem: S <=1      -  Levofloxacin: R >4      -  Meropenem: S <=1      -  Nitrofurantoin: S <=32 Should not be used to treat pyelonephritis      -  Piperacillin/Tazobactam: S <=8      -  Tigecycline: S <=2      -  Tobramycin: S <=2      -  Trimethoprim/Sulfamethoxazole: S <=0.5/9.5      Method Type: MARCELLO    Culture - Blood (collected 06-18-22 @ 08:22)  Source: .Blood Blood-Peripheral  Preliminary Report (06-20-22 @ 01:02):    No growth to date.    Culture - Blood (collected 06-18-22 @ 08:20)  Source: .Blood Blood-Peripheral  Preliminary Report (06-20-22 @ 01:02):    No growth to date.                              RADIOLOGY, ECG, & ADDITIONAL TESTS:  12 Lead ECG:   Ventricular Rate 113 BPM    Atrial Rate 113 BPM    P-R Interval 152 ms    QRS Duration 84 ms    Q-T Interval 322 ms    QTC Calculation(Bazett) 441 ms    P Axis 51 degrees    R Axis 26 degrees    T Axis 47 degrees    Diagnosis Line Sinus tachycardia  Possible Left atrial enlargement  Borderline ECG    Confirmed by VIVIENNE HAQUE MD ([id number]) on 6/18/2022 9:02:05 AM (06-18-22 @ 07:18)    CT Abdomen and Pelvis No Cont:   ACC: [id number] EXAM:  CT ABDOMEN AND PELVIS                          PROCEDURE DATE:  06/18/2022          INTERPRETATION:  CLINICAL HISTORY: Abdominal pain. Fever.    TECHNIQUE: Contiguous axial CT images were obtained from the lower chest   to the pubic symphysis without intravenous contrast. Oral contrast was   not administered. Reformatted images in the coronal and sagittal planes   were acquired.    COMPARISON: CT ABDOMEN/PELVIS 5/19/2022.    FINDINGS:      LOWER CHEST: Right lower lobe patchy opacity.    HEPATOBILIARY: Post cholecystectomy.    SPLEEN: Unremarkable.    PANCREAS: Unremarkable.    ADRENAL GLANDS: Unremarkable.    KIDNEYS: No hydronephrosis. Stable bilateral renal cysts.    ABDOMINOPELVIC NODES: No lymphadenopathy.    PELVIC ORGANS: A Freeman catheter is within a decompressed urinary bladder.   Air is present within the urinary bladder consistent with instrumentation.    PERITONEUM/MESENTERY/BOWEL: No bowel obstruction, ascites or   intraperitoneal free air. Copious stoolwithin the rectum without   evidence of stercoral colitis. Unchanged normal caliber appendix with   numerous appendicoliths.    BONES/SOFT TISSUES: Degenerative change of the spine. Small   fat-containing umbilical hernia.    OTHER: Atherosclerotic calcification.    IMPRESSION:    1. Right lower lobe patchy opacification. Findings may represent   pneumonia in the appropriate clinical setting.    2. No evidence of acute/inflammatory process within the abdomen or pelvis.    --- End of Report ---          CEDRIC GILMAN MD; Resident Radiologist  This document has been electronically signed.  APURVA RAY MD; Attending Radiologist  This document has been electronically signed. Jun 18 2022 11:29AM (06-18-22 @ 11:19)    RECENT DIAGNOSTIC ORDERS:  VA Duplex Upper Extrem Arterial, Bilat: Routine   Indication: eval for arterial insufficiency  Transport: [facility] (06-22-22 @ 15:14)      MEDICATIONS:  MEDICATIONS  (STANDING):  albuterol/ipratropium for Nebulization 3 milliLiter(s) Nebulizer every 6 hours  amLODIPine   Tablet 2.5 milliGRAM(s) Oral daily  apixaban 5 milliGRAM(s) Oral every 12 hours  aspirin  chewable 81 milliGRAM(s) Oral daily  atorvastatin 20 milliGRAM(s) Oral at bedtime  cefTRIAXone   IVPB 1000 milliGRAM(s) IV Intermittent every 24 hours  chlorhexidine 4% Liquid 1 Application(s) Topical <User Schedule>  furosemide    Tablet 40 milliGRAM(s) Oral daily  gabapentin 400 milliGRAM(s) Oral two times a day  lamoTRIgine 100 milliGRAM(s) Oral two times a day  loratadine 10 milliGRAM(s) Oral daily  metoprolol tartrate 75 milliGRAM(s) Oral two times a day  montelukast 10 milliGRAM(s) Oral daily  pantoprazole    Tablet 40 milliGRAM(s) Oral before breakfast  polyethylene glycol 3350 17 Gram(s) Oral daily  QUEtiapine 250 milliGRAM(s) Oral at bedtime  senna 2 Tablet(s) Oral at bedtime  sodium chloride 0.9% lock flush 3 milliLiter(s) IV Push every 8 hours    MEDICATIONS  (PRN):  acetaminophen     Tablet .. 650 milliGRAM(s) Oral every 6 hours PRN Mild Pain (1 - 3)  bisacodyl 5 milliGRAM(s) Oral every 12 hours PRN Constipation  LORazepam     Tablet 0.5 milliGRAM(s) Oral two times a day PRN Agitation      HOME MEDICATIONS:  albuterol 90 mcg/inh inhalation aerosol with adapter (06-18)  amLODIPine 2.5 mg oral tablet (06-18)  aspirin 81 mg oral tablet (06-18)  atorvastatin 20 mg oral tablet (06-18)  Breo Ellipta 200 mcg-25 mcg/inh inhalation powder (06-18)  Eliquis 5 mg oral tablet (06-18)  furosemide 40 mg oral tablet (06-18)  gabapentin 400 mg oral capsule (06-18)  Incruse Ellipta 62.5 mcg/inh inhalation powder (06-18)  lamoTRIgine 100 mg oral tablet (06-18)  LORazepam 0.5 mg oral tablet (06-18)  metoprolol tartrate 75 mg oral tablet (06-18)  montelukast 10 mg oral tablet (06-18)  pantoprazole 40 mg oral delayed release tablet (06-18)  QUEtiapine 200 mg oral tablet (06-18)  Xyzal (06-18)      PHYSICAL EXAM:  GENERAL: awake, alert, NAD   CHEST/LUNG:  Clear to auscultaions b/l   HEART:  regular rhythm   ABDOMEN:  soft, non tender   EXTREMITIES:  no edema legs,  hands b/l hands ( left more- bluish inner aspect of fingers ) good radail pulse, no pain, or tenderness ( capillary              SHAUN COATES  77y, Female  Allergy: codeine (Other (Moderate))  Depakote (Unknown)  Dilaudid (Short breath; Rash)  IV Contrast (Anaphylaxis)  losartan (Angioedema)  Risperdal (Other)  verapamil (Short breath; Angioedema)    Hospital Day: 4d    Patient seen and examined earlier today. she stated that she feels much better, she stated that she came to the ED for sob and dry cough , later in the day she was noticed to have b/l fingers cold and bluish/ coloration - she stated that this happens to her when her hands ar cold , no pain or discomfort in her hands and usually it disapear by itself on warming the hands, later this is improved    PMH/PSH:  PAST MEDICAL & SURGICAL HISTORY:  Hypertension      Depression      COPD (chronic obstructive pulmonary disease)      Other cardiomyopathy      Other emphysema      PVD (peripheral vascular disease)      Bipolar 1 disorder      FLAVIO on CPAP      Transient ischemic attack  x 3      Congestive heart failure      Falls      2019 novel coronavirus disease (COVID-19)      Respiratory failure requiring intubation      Afib      HLD (hyperlipidemia)      History of cholecystectomy      H/O carotid angioplasty          LAST 24-Hr EVENTS:    VITALS:  T(F): 97.2 (06-22-22 @ 14:05), Max: 97.4 (06-22-22 @ 04:43)  HR: 82 (06-22-22 @ 14:05)  BP: 132/60 (06-22-22 @ 14:05) (97/61 - 133/60)  RR: 18 (06-22-22 @ 06:11)  SpO2: 97% (06-22-22 @ 07:18)          TESTS & MEASUREMENTS:  Weight/BMI  87 (06-18-22 @ 13:32)  35.1 (06-18-22 @ 13:32)        INR: 1.54 ratio (06-18-22 @ 08:14)                  Culture - Urine (collected 06-18-22 @ 08:28)  Source: Clean Catch Clean Catch (Midstream)  Final Report (06-21-22 @ 11:06):    >100,000 CFU/ml Escherichia coli  Organism: Escherichia coli (06-21-22 @ 11:06)  Organism: Escherichia coli (06-21-22 @ 11:06)      -  Amikacin: S <=16      -  Amoxicillin/Clavulanic Acid: S <=8/4      -  Ampicillin: S <=8 These ampicillin results predict results for amoxicillin      -  Ampicillin/Sulbactam: S <=4/2 Enterobacter, Klebsiella aerogenes, Citrobacter, and Serratia may develop resistance during prolonged therapy (3-4 days)      -  Aztreonam: S <=4      -  Cefazolin: S <=2 (MIC_CL_COM_ENTERIC_CEFAZU) For uncomplicated UTI with K. pneumoniae, E. coli, or P. mirablis: MARCELLO <=16 is sensitive and MARCELLO >=32 is resistant. This also predicts results for oral agents cefaclor, cefdinir, cefpodoxime, cefprozil, cefuroxime axetil, cephalexin and locarbef for uncomplicated UTI. Note that some isolates may be susceptible to these agents while testing resistant to cefazolin.      -  Cefepime: S <=2      -  Cefoxitin: S <=8      -  Ceftriaxone: S <=1 Enterobacter, Klebsiella aerogenes, Citrobacter, and Serratia may develop resistance during prolonged therapy      -  Ciprofloxacin: R >2      -  Ertapenem: S <=0.5      -  Gentamicin: S <=2      -  Imipenem: S <=1      -  Levofloxacin: R >4      -  Meropenem: S <=1      -  Nitrofurantoin: S <=32 Should not be used to treat pyelonephritis      -  Piperacillin/Tazobactam: S <=8      -  Tigecycline: S <=2      -  Tobramycin: S <=2      -  Trimethoprim/Sulfamethoxazole: S <=0.5/9.5      Method Type: MARCELLO    Culture - Blood (collected 06-18-22 @ 08:22)  Source: .Blood Blood-Peripheral  Preliminary Report (06-20-22 @ 01:02):    No growth to date.    Culture - Blood (collected 06-18-22 @ 08:20)  Source: .Blood Blood-Peripheral  Preliminary Report (06-20-22 @ 01:02):    No growth to date.                              RADIOLOGY, ECG, & ADDITIONAL TESTS:  12 Lead ECG:   Ventricular Rate 113 BPM    Atrial Rate 113 BPM    P-R Interval 152 ms    QRS Duration 84 ms    Q-T Interval 322 ms    QTC Calculation(Bazett) 441 ms    P Axis 51 degrees    R Axis 26 degrees    T Axis 47 degrees    Diagnosis Line Sinus tachycardia  Possible Left atrial enlargement  Borderline ECG    Confirmed by VIVIENNE HAQUE MD ([id number]) on 6/18/2022 9:02:05 AM (06-18-22 @ 07:18)    CT Abdomen and Pelvis No Cont:   ACC: [id number] EXAM:  CT ABDOMEN AND PELVIS                          PROCEDURE DATE:  06/18/2022          INTERPRETATION:  CLINICAL HISTORY: Abdominal pain. Fever.    TECHNIQUE: Contiguous axial CT images were obtained from the lower chest   to the pubic symphysis without intravenous contrast. Oral contrast was   not administered. Reformatted images in the coronal and sagittal planes   were acquired.    COMPARISON: CT ABDOMEN/PELVIS 5/19/2022.    FINDINGS:      LOWER CHEST: Right lower lobe patchy opacity.    HEPATOBILIARY: Post cholecystectomy.    SPLEEN: Unremarkable.    PANCREAS: Unremarkable.    ADRENAL GLANDS: Unremarkable.    KIDNEYS: No hydronephrosis. Stable bilateral renal cysts.    ABDOMINOPELVIC NODES: No lymphadenopathy.    PELVIC ORGANS: A Freeman catheter is within a decompressed urinary bladder.   Air is present within the urinary bladder consistent with instrumentation.    PERITONEUM/MESENTERY/BOWEL: No bowel obstruction, ascites or   intraperitoneal free air. Copious stoolwithin the rectum without   evidence of stercoral colitis. Unchanged normal caliber appendix with   numerous appendicoliths.    BONES/SOFT TISSUES: Degenerative change of the spine. Small   fat-containing umbilical hernia.    OTHER: Atherosclerotic calcification.    IMPRESSION:    1. Right lower lobe patchy opacification. Findings may represent   pneumonia in the appropriate clinical setting.    2. No evidence of acute/inflammatory process within the abdomen or pelvis.    --- End of Report ---          CEDRIC GILMAN MD; Resident Radiologist  This document has been electronically signed.  APURVA RAY MD; Attending Radiologist  This document has been electronically signed. Jun 18 2022 11:29AM (06-18-22 @ 11:19)    RECENT DIAGNOSTIC ORDERS:  VA Duplex Upper Extrem Arterial, Bilat: Routine   Indication: eval for arterial insufficiency  Transport: [facility] (06-22-22 @ 15:14)      MEDICATIONS:  MEDICATIONS  (STANDING):  albuterol/ipratropium for Nebulization 3 milliLiter(s) Nebulizer every 6 hours  amLODIPine   Tablet 2.5 milliGRAM(s) Oral daily  apixaban 5 milliGRAM(s) Oral every 12 hours  aspirin  chewable 81 milliGRAM(s) Oral daily  atorvastatin 20 milliGRAM(s) Oral at bedtime  cefTRIAXone   IVPB 1000 milliGRAM(s) IV Intermittent every 24 hours  chlorhexidine 4% Liquid 1 Application(s) Topical <User Schedule>  furosemide    Tablet 40 milliGRAM(s) Oral daily  gabapentin 400 milliGRAM(s) Oral two times a day  lamoTRIgine 100 milliGRAM(s) Oral two times a day  loratadine 10 milliGRAM(s) Oral daily  metoprolol tartrate 75 milliGRAM(s) Oral two times a day  montelukast 10 milliGRAM(s) Oral daily  pantoprazole    Tablet 40 milliGRAM(s) Oral before breakfast  polyethylene glycol 3350 17 Gram(s) Oral daily  QUEtiapine 250 milliGRAM(s) Oral at bedtime  senna 2 Tablet(s) Oral at bedtime  sodium chloride 0.9% lock flush 3 milliLiter(s) IV Push every 8 hours    MEDICATIONS  (PRN):  acetaminophen     Tablet .. 650 milliGRAM(s) Oral every 6 hours PRN Mild Pain (1 - 3)  bisacodyl 5 milliGRAM(s) Oral every 12 hours PRN Constipation  LORazepam     Tablet 0.5 milliGRAM(s) Oral two times a day PRN Agitation      HOME MEDICATIONS:  albuterol 90 mcg/inh inhalation aerosol with adapter (06-18)  amLODIPine 2.5 mg oral tablet (06-18)  aspirin 81 mg oral tablet (06-18)  atorvastatin 20 mg oral tablet (06-18)  Breo Ellipta 200 mcg-25 mcg/inh inhalation powder (06-18)  Eliquis 5 mg oral tablet (06-18)  furosemide 40 mg oral tablet (06-18)  gabapentin 400 mg oral capsule (06-18)  Incruse Ellipta 62.5 mcg/inh inhalation powder (06-18)  lamoTRIgine 100 mg oral tablet (06-18)  LORazepam 0.5 mg oral tablet (06-18)  metoprolol tartrate 75 mg oral tablet (06-18)  montelukast 10 mg oral tablet (06-18)  pantoprazole 40 mg oral delayed release tablet (06-18)  QUEtiapine 200 mg oral tablet (06-18)  Xyzal (06-18)      PHYSICAL EXAM:  GENERAL: awake, alert, NAD   CHEST/LUNG:  Clear to auscultaions b/l   HEART:  regular rhythm   ABDOMEN:  soft, non tender   EXTREMITIES:  no edema legs,  hands b/l hands ( left more- bluish inner aspect of fingers ) good radial pulse, no pain with passive movement , or tenderness ( unable to check capillary refil for nail polish)

## 2022-06-22 NOTE — PROGRESS NOTE ADULT - ASSESSMENT
77-year-old female from home with past medical history atrial fibrillation on Eliquis, TIA x 3, HTN, HLD, COPD on 4 L nasal cannula, septic shock October 2021, NSTEMI, status post catheterization showing non-obstructive coronary artery disease presents to the ED with coughing and shortness of breath/ FEVER 102      #PNA / UTI sepsis ( tachycardia / fever/ leukocytosis )   #JOYCE creat 1.2  #CKD3a  - CTAP:   Right lower lobe patchy opacification.   - UA: LE+ nitrite +, pyuria and bacteria  - UCx: Ecoli  -Blood Cx- NGTD  - tylenol prn fever  - ID consult appreciated, need RVP as only COVID and flu sent in ED, continue Cefepime  -MRSA neg, follow strep, legionella    # LT calf/ knee tenderness   # B/L LE coldness r/o arterial insufficiency  - xray- chronic changes, no fx or dislocation  - arterial doppler   PERELIM-  Impression:  Mild to moderate bilateral external iliac artery stenoses.  Markedly diminished infrapopliteal arterial blood flow.  CT angiogram of the abdomen with bilateral lower extremity runoff is   recommended in the appropriate clinical setting.  - on eliquis for afib   -Pt allergic to contrast dye; per discussion with vascular no need for urgent intervention at this point given present pulses    #HTN  - c/w norvasc    #h/o of multiple TIA  #h/o of chronic persistent afib  - c/w metoprolol and Eliquis and statin  - TTE (10/21/21): EF 55-60%, mod pHTN, no valvular or wall motion abn    #COPD  #FLAVIO  - continue Albuterol MDI, Singular, Tiotropium  - c/w cpap as needed  - O2 nc 4L ( home dose )  - duoneb q6     #depression  continue Seroquel    #HLD  - continue Statin    #OA  - tylenol prn pain 1-3  -PT      To follow:  Follow with ID for duration of Abx for PNA, UTI.  Likely DC 24-48 hours.
77-year-old female from home with past medical history atrial fibrillation on Eliquis, TIA x 3, HTN, HLD, COPD on 4 L nasal cannula, septic shock October 2021, NSTEMI, status post catheterization showing non-obstructive coronary artery disease presents to the ED with coughing and shortness of breath/ FEVER 102      #PNA / sepsis ( tachycardia / fever/ leukocytosis )   #JOYCE creat 1.2  #CKD3a  - CTAP:   Right lower lobe patchy opacification.   - UA: LE+ nitrite +, pyuria and bacteria  - IV ABX: cefepime, DCed Vanco  - UCx: Ecoli, follow sens  -Blood Cx- NGTD  - tylenol prn fever  - ID consult appreciated, need RVP as only COVID and flu sent in ED  - urine for strep/ leg     # LT calf/ knee tenderness   # B/L LE coldness r/o arterial insufficiency  - xray- chronic changes, no fx or dislocation  - arterial doppler   PERELIM-  Impression:  Mild to moderate bilateral external iliac artery stenoses.  Markedly diminished infrapopliteal arterial blood flow.  CT angiogram of the abdomen with bilateral lower extremity runoff is   recommended in the appropriate clinical setting.  - on eliquis for afib   -Pt allergic to contrast dye; per discussion with vascular no need for urgent intervention at this point given present pulses    #HTN  - c/w norvasc    #h/o of multiple TIA  #h/o of chronic persistent afib  - c/w metoprolol and Eliquis and statin  - TTE (10/21/21): EF 55-60%, mod pHTN, no valvular or wall motion abn    #COPD  #FLAVIO  - continue Albuterol MDI, Singular, Tiotropium  - c/w cpap as needed  - O2 nc 4L ( home dose )  - duoneb q6     #depression  continue Seroquel    #HLD  - continue Statin    #OA  - tylenol prn pain 1-3  -PT
77-year-old female from home with past medical history atrial fibrillation on Eliquis, TIA x 3, HTN, HLD, COPD on 4 L nasal cannula, septic shock October 2021, NSTEMI, status post catheterization showing non-obstructive coronary artery disease presents to the ED with coughing and shortness of breath/ FEVER 102      #PNA / sepsis ( tachycardia / fever/ leukocytosis )   #JOYCE creat 1.2  #CKD3a  - CTAP:   Right lower lobe patchy opacification.   - UA: LE+ nitrite +, pyuria and bacteria  - IV ABX: cefepime, DCed Vanco  - f/u bld and Ucx  - tylenol prn fever  - ID consult appreciated, need RVP as only COVID and flu sent in ED  - urine for strep/ leg   - remove stephens cath     # LT calf/ knee tenderness   # B/L LE coldness r/o arterial insufficiency  - xray- chronic changes, no fx or dislocation  - arterial doppler   PERELIM-  Impression:  Mild to moderate bilateral external iliac artery stenoses.  Markedly diminished infrapopliteal arterial blood flow.  CT angiogram of the abdomen with bilateral lower extremity runoff is   recommended in the appropriate clinical setting.  - on eliquis for afib   -Pt allergic to contrast dye; per discussion with vascular no need for urgent intervention at this point given present pulses    #HTN  - c/w norvasc    #h/o of multiple TIA  #h/o of chronic persistent afib  - c/w metoprolol and Eliquis and statin  - TTE (10/21/21): EF 55-60%, mod pHTN, no valvular or wall motion abn    #COPD  #FLAVIO  - continue Albuterol MDI, Singular, Tiotropium  - c/w cpap as needed  - O2 nc 4L ( home dose )  - duoneb q6     #depression  continue Seroquel    #HLD  - continue Statin    #OA  - tylenol prn pain 1-3  
77-year-old female from home with past medical history atrial fibrillation on Eliquis, TIA x 3, HTN, HLD, COPD on 4 L nasal cannula, septic shock October 2021, NSTEMI, status post catheterization showing non-obstructive coronary artery disease presents to the ED with coughing and shortness of breath/ FEVER 102      #PNA / UTI sepsis on admission  ( tachycardia / fever/ leukocytosis )   #CKD3a  - CTAP:   Right lower lobe patchy opacification.   - UA: LE+ nitrite +, pyuria and bacteria  - UCx: Ecoli  -Blood Cx- NGTD  - tylenol prn fever  - ID consult appreciated,  narrow cefepime to ceftriaxone 1g daily .- can finish 7 day course with PO Augmentin 875/125 mg BID on discharge      -MRSA neg, follow strep, legionella    # LT calf/ knee tenderness   # B/L LE coldness r/o arterial insufficiency  - xray- chronic changes, no fx or dislocation  - arterial doppler   PERELIM-  Impression:  Mild to moderate bilateral external iliac artery stenoses.  Markedly diminished infrapopliteal arterial blood flow.  CT angiogram of the abdomen with bilateral lower extremity runoff is   recommended in the appropriate clinical setting.  - on Eliquis for afib   -Pt allergic to contrast dye; per discussion with vascular no need for urgent intervention at this point given present pulses- pt can f/u with dr. Huerta in office after resolution of pna / uti    []b/l periodic hand white/bluish fingers - likely Corral phenomena. will get arterial duplex     #HTN  - c/w norvasc    #h/o of multiple TIA  #h/o of chronic persistent afib  - c/w metoprolol and Eliquis and statin  - TTE (10/21/21): EF 55-60%, mod pHTN, no valvular or wall motion abn    #COPD  #FLAVIO  - continue Albuterol MDI, Singular, Tiotropium  - c/w cpap as needed  - O2 nc 4L ( home dose )  - duoneb q6     #depression  continue Seroquel    #HLD  - continue Statin    #OA  - tylenol prn pain 1-3  -PT      To follow:  upper ext arterial duplex   Follow with ID for duration of Abx for PNA, UTI.  Likely DC 24-48 hours.  
ASSESSMENT  77-year-old female FROM HOME  with past medical history atrial fibrillation on Eliquis, TIA x 3, HTN, HLD, COPD on 4 L nasal cannula, septic shock October 2021, NSTEMI, status post catheterization showing non-obstructive coronary artery disease presents to the ED with coughing and shortness of breath.       IMPRESSION  #Sepsis on admissionT>101F, Pulse>90    Suspected viral PNA and symptomatic UTI /acute cystitis    UA WBC 10-25    5/18 UCX  >100,000 CFU/ml Klebsiella pneumoniae    CT 1. Right lower lobe patchy opacification. Findings may represent   pneumonia in the appropriate clinical setting.  2. No evidence of acute/inflammatory process within the abdomen or pelvis.    RVP negative  #Elevated AP  #MOrbid Obesity BMI (kg/m2): 35.1  #COPD  #Abx allergy: none  Creatinine, Serum: 1.2 (06-18-22 @ 08:14)    Weight (kg): 87 (06-18-22 @ 13:32)    RECOMMENDATIONS  - narrow cefepime to ceftriaxone 1g daily   - can finish 7 day course with PO augmentin 875/125 mg BID on discharge    Please call or message on [employer] Teams if with any questions.  Spectra [phone]  
ASSESSMENT  77-year-old female FROM HOME  with past medical history atrial fibrillation on Eliquis, TIA x 3, HTN, HLD, COPD on 4 L nasal cannula, septic shock October 2021, NSTEMI, status post catheterization showing non-obstructive coronary artery disease presents to the ED with coughing and shortness of breath.       IMPRESSION  #Sepsis on admissionT>101F, Pulse>90    Suspected viral PNA and symptomatic UTI /acute cystitis    UA WBC 10-25    5/18 UCX  >100,000 CFU/ml Klebsiella pneumoniae    CT 1. Right lower lobe patchy opacification. Findings may represent   pneumonia in the appropriate clinical setting.  2. No evidence of acute/inflammatory process within the abdomen or pelvis.  #Elevated AP  #MOrbid Obesity BMI (kg/m2): 35.1  #COPD  #Abx allergy: none  Creatinine, Serum: 1.2 (06-18-22 @ 08:14)    Weight (kg): 87 (06-18-22 @ 13:32)    RECOMMENDATIONS  - follow-up RVP  - continue cefepime 1g q 12 hours   - follow-up E coli Urine susceptibilities    Please call or message on [employer] Teams if with any questions.  Spectra [phone]

## 2022-06-23 NOTE — DISCHARGE NOTE PROVIDER - NSDCMRMEDTOKEN_GEN_ALL_CORE_FT
albuterol 90 mcg/inh inhalation aerosol with adapter: 2 puff(s) inhaled every 4 hours, As Needed  amLODIPine 2.5 mg oral tablet: 1 tab(s) orally once a day  amoxicillin-clavulanate 875 mg-125 mg oral tablet: 1 tab(s) orally 2 times a day   aspirin 81 mg oral tablet:   atorvastatin 20 mg oral tablet: 1 tab(s) orally once a day  Breo Ellipta 200 mcg-25 mcg/inh inhalation powder: puff(s) inhaled once a day  Eliquis 5 mg oral tablet: 1 tab(s) orally 2 times a day  freetext medication     -: 1 puff(s) inhaled once a day  furosemide 40 mg oral tablet: 1 tab(s) orally once a day  gabapentin 400 mg oral capsule: 1 cap(s) orally 2 times a day  Incruse Ellipta 62.5 mcg/inh inhalation powder: 1 puff(s) inhaled every 24 hours  lamoTRIgine 100 mg oral tablet: 1 tab(s) orally 2 times a day  LORazepam 0.5 mg oral tablet: 1  orally 2 times a day, As Needed for agitation  metoprolol tartrate 75 mg oral tablet: 1 tab(s) orally 2 times a day  montelukast 10 mg oral tablet: 1 tab(s) orally once a day  pantoprazole 40 mg oral delayed release tablet: 1 tab(s) orally once a day  polyethylene glycol 3350 oral powder for reconstitution: 17 gram(s) orally once a day  QUEtiapine 200 mg oral tablet: 250 milligram(s) orally once a day (at bedtime)  Xyzal: 1 tab(s) orally once a day

## 2022-06-23 NOTE — DISCHARGE NOTE PROVIDER - HOSPITAL COURSE
77-year-old female from home with past medical history atrial fibrillation on Eliquis, TIA x 3, HTN, HLD, COPD on 4 L nasal cannula, septic shock October 2021, NSTEMI, status post catheterization showing non-obstructive coronary artery disease presents to the ED with coughing and shortness of breath/ FEVER 102  Patient admitted to medical floor for-  #PNA / UTI sepsis on admission  ( tachycardia / fever/ leukocytosis )   #CKD3a  - CTAP:   Right lower lobe patchy opacification.   - UA: LE+ nitrite +, pyuria and bacteria  - UCx: Ecoli  -Blood Cx- NGTD  - gave tylenol prn fever  - ID consult appreciated,  narrow cefepime to ceftriaxone 1g daily .- can finish 7 day course with PO Augmentin 875/125 mg BID on discharge      -MRSA neg,  strep, legionella are negative    # LT calf/ knee tenderness   # B/L LE coldness r/o arterial insufficiency  - xray- chronic changes, no fx or dislocation  - arterial doppler Impression:  Mild to moderate bilateral external iliac artery stenoses.  Markedly diminished infrapopliteal arterial blood flow.  CT angiogram of the abdomen with bilateral lower extremity runoff is   recommended in the appropriate clinical setting.  - on Eliquis for afib   -Pt allergic to contrast dye; per discussion with vascular no need for urgent intervention at this point given present pulses- pt can f/u with dr. Huerta in office after resolution of pna / uti    []b/l periodic hand white/bluish fingers - likely Raynauds phenomenon  arterial duplex showed normal flow b/l ue     #HTN  - c/w norvasc    #h/o of multiple TIA  #h/o of chronic persistent afib  - c/w metoprolol and Eliquis and statin  - TTE (10/21/21): EF 55-60%, mod pHTN, no valvular or wall motion abn    #COPD  #FLAVIO  - continue Albuterol MDI, Singular, Tiotropium  - c/w cpap as needed  - O2 nc 4L ( home dose )  - duoneb q6     #depression  continue Seroquel    #HLD  - continue Statin    #OA  - tylenol prn pain 1-3  -PT    Pt clinically improved and dc to [facility]     77-year-old female from home with past medical history atrial fibrillation on Eliquis, TIA x 3, HTN, HLD, COPD on 4 L nasal cannula, septic shock October 2021, NSTEMI, status post catheterization showing non-obstructive coronary artery disease presents to the ED with coughing and shortness of breath/ FEVER 102  Patient admitted to medical floor for-  #PNA / UTI sepsis on admission  ( tachycardia / fever/ leukocytosis )   #CKD3a  - CTAP:   Right lower lobe patchy opacification.   - UA: LE+ nitrite +, pyuria and bacteria  - UCx: Ecoli  -Blood Cx- NGTD  - gave tylenol prn fever  - ID consult appreciated,  narrow cefepime to ceftriaxone 1g daily .- can finish 7 day course with PO Augmentin 875/125 mg BID on discharge      -MRSA neg,  strep, legionella are negative    # LT calf/ knee tenderness   # B/L LE coldness r/o arterial insufficiency  - xray- chronic changes, no fx or dislocation  - arterial doppler Impression:  Mild to moderate bilateral external iliac artery stenoses.  Markedly diminished infrapopliteal arterial blood flow.  CT angiogram of the abdomen with bilateral lower extremity runoff is   recommended in the appropriate clinical setting.  - on Eliquis for afib   -Pt allergic to contrast dye; per discussion with vascular no need for urgent intervention at this point given present pulses- pt can f/u with dr. Huerta in office after resolution of pna / uti    []b/l periodic hand white/bluish fingers - likely Raynauds phenomenon  arterial duplex showed normal flow b/l ue     #HTN  - c/w norvasc    #h/o of multiple TIA  #h/o of chronic persistent afib  - c/w metoprolol and Eliquis and statin  - TTE (10/21/21): EF 55-60%, mod pHTN, no valvular or wall motion abn    #COPD  #FLAVIO  - continue Albuterol MDI, Singular, Tiotropium  - c/w cpap as needed  - O2 nc 4L ( home dose )  - duoneb q6     #depression  continue Seroquel    #HLD  - continue Statin    #OA  - tylenol prn pain 1-3  -PT    Pt clinically improved and dc to [facility]

## 2022-06-23 NOTE — DISCHARGE NOTE PROVIDER - NSDCFUADDAPPT_GEN_ALL_CORE_FT
-please follow up with your primary doctor for eval of possible Raynauds phenomenon   -Please follow up with vascular dr. Huerta in office

## 2022-06-23 NOTE — DISCHARGE NOTE PROVIDER - CARE PROVIDER_API CALL
Milad Aguilar  FAMILY MEDICINE  [address] [place] [address]  Phone: [phone]  Fax: [phone]  Follow Up Time: 1 week    Michael Huerta)  Surgery; Vascular Surgery  [address] [place] [address]  Phone: [phone]  Fax: [phone]  Follow Up Time: 2 weeks    Jacob Vickers (DO)  Critical Care Medicine; Pulmonary Disease; Sleep Medicine  [address] [place] [address], [address]  [place] [address]  Phone: [phone]  Fax: [phone]  Follow Up Time: 1 week

## 2022-06-23 NOTE — DISCHARGE NOTE PROVIDER - NSDCFUSCHEDAPPT_GEN_ALL_CORE_FT
[facility]  PULMED [address] [place] [address]  Scheduled Appointment: 09/08/2022

## 2022-06-23 NOTE — DISCHARGE NOTE NURSING/CASE MANAGEMENT/SOCIAL WORK - PATIENT PORTAL LINK FT
You can access the FollowMyHealth Patient Portal offered by [facility] by registering at the following website: [email or web address]. By joining [facility]’s FollowMyHealth portal, you will also be able to view your health information using other applications (apps) compatible with our system.

## 2022-06-23 NOTE — DISCHARGE NOTE PROVIDER - CARE PROVIDERS DIRECT ADDRESSES
[email or web address]

## 2022-06-23 NOTE — DISCHARGE NOTE PROVIDER - ATTENDING DISCHARGE PHYSICAL EXAMINATION:
On exam General awake, alert NAD, Lungs clear to ausculations b/l, Heart regular ryhthm, Abdomen: soft, non tender non distended, Ext no edema , no cyanosis hands today

## 2022-06-23 NOTE — DISCHARGE NOTE NURSING/CASE MANAGEMENT/SOCIAL WORK - NSDCVIVACCINE_GEN_ALL_CORE_FT
influenza, injectable, quadrivalent, preservative free; 03-Oct-2019 11:19; Mona Martinez (RN); [employer]; [id number] (Exp. Date: 30-Jun-2020); IntraMuscular; Deltoid Left.; 0.5 milliLiter(s); VIS (VIS Published: 15-Aug-2019, VIS Presented: 03-Oct-2019);   Tdap; 13-Oct-2018 09:00; Shanell Davis (RN); [employer]; [id number] (Exp. Date: 22-Aug-2020); IntraMuscular; Deltoid Left.; 0.5 milliLiter(s); VIS (VIS Published: 09-May-2013, VIS Presented: 13-Oct-2018);

## 2022-06-23 NOTE — DISCHARGE NOTE PROVIDER - NSDCCPCAREPLAN_GEN_ALL_CORE_FT
PRINCIPAL DISCHARGE DIAGNOSIS  Diagnosis: Pneumonia  Assessment and Plan of Treatment: You where treated with IV antibiotics and improved. Follow up with your primary care doctor and pulmonologist. Complete a course of antibiotic with augmentin as recommended by the infectous disease doctor. Return to hospital if increasing shortness of breath or fever.      SECONDARY DISCHARGE DIAGNOSES  Diagnosis: Acute UTI  Assessment and Plan of Treatment: You where treated with IV antibiotics and improved. Complete a course of antibiotic with augmentin. Return to hospital if fever or severe abdominal pain.    Diagnosis: Peripheral vascular disease  Assessment and Plan of Treatment: Your lower extremity arterial ultrasound showed decrease flow in infrapopliteal arterial system. Follow up with vascular surgeon. return to hospital if you develop severe pain in lower extremities that is not immediately relieved by resting and elevating the legs or if you develop persistent color change and numbness of feet not relieved by change in position of legs.

## 2022-06-24 NOTE — PROGRESS NOTE ADULT - SUBJECTIVE AND OBJECTIVE BOX
"[facility] SLEEP MEDICINE  [address]    [facility] [address] [phone]    Referring Physician: Dr. Pang  PCP: Diomedes Pang MD    Reason for consult:  Sleep disorders of insomnia    Franklin Hood is a 47 y.o.male was seen in the [facility] today. He quit alcohol 8 mths ago. Having difficulty sleeping since then. He goes to bed at 10pm and awakens at 6am. Falls asleep for 45 mins. Then he gets creepy feeling going through entire body. Wakes up tired and has EDS. Denies snoring. No witnessed apneas.  [place] Sleepiness Score: 0. Caffeine intake 5 per day. Alcohol intake 0 per week.    Franklin Hood  has no past medical history on file. anxiety and depression    Current Medications:    Current Outpatient Medications:   •  escitalopram (Lexapro) 10 MG tablet, Take 1 tablet by mouth Daily., Disp: 30 tablet, Rfl: 4  •  QUEtiapine (SEROquel) 25 MG tablet, Take 2 tablets by mouth Every Night for 90 days., Disp: 60 tablet, Rfl: 2   also listed in Sleep Questionnaire.    FH: family history includes No Known Problems in his father and mother.  SH:  reports that he has never smoked. His smokeless tobacco use includes chew.    Pertinent Positive Review of Systems of depression  Rest of Review of Systems was negative as recorded in Sleep Questionnaire.        Vital Signs: /75   Pulse 71   Ht 165.1 cm (65\")   Wt 55.8 kg (123 lb)   SpO2 100%   BMI 20.47 kg/m²     Body mass index is 20.47 kg/m².       Tongue: Normal       Dentition: good       Pharynx: Posterior pharyngeal pillars are wide   Mallampatti: II (hard and soft palate, upper portion of tonsils anduvula visible)        General: Alert. Cooperative. Well developed. No acute distress.             Head:  Normocephalic. Symmetrical. Atraumatic.              Eyes: Sclera clear. No icterus. PERRLA. Normal EOM.             Ears: No deformities. Normal hearing.             Nose: No septal deviation. No drainage.          " SUBJECTIVE:   LENGTH OF HOSPITAL STAY: 12d    CHIEF COMPLAINT:  Patient is a 75y old  Female who presents with a chief complaint of Odynophagia associated with chest pain (10 Dec 2020 00:40)      Events over the past 24 hours:  Patient was seen and examined at the bedside this morning.    REVIEW OF SYSTEMS  Negative Except as mentioned above.    ALLERGIES:  codeine (Other (Moderate))  Depakote (Unknown)  Dilaudid (Short breath; Rash)  IV Contrast (Anaphylaxis)  losartan (Angioedema)  Risperdal (Other)  verapamil (Short breath; Angioedema)        MEDICATIONS:  STANDING MEDICATIONS  ALBUTerol    90 MICROgram(s) HFA Inhaler 2 Puff(s) Inhalation every 6 hours  aspirin  chewable 81 milliGRAM(s) Oral daily  atorvastatin 20 milliGRAM(s) Oral at bedtime  budesonide 160 MICROgram(s)/formoterol 4.5 MICROgram(s) Inhaler 2 Puff(s) Inhalation two times a day  chlorhexidine 4% Liquid 1 Application(s) Topical <User Schedule>  clopidogrel Tablet 75 milliGRAM(s) Oral daily  dabigatran 150 milliGRAM(s) Oral every 12 hours  dexAMETHasone  Injectable 6 milliGRAM(s) IV Push daily  famotidine    Tablet 20 milliGRAM(s) Oral daily  furosemide    Tablet 40 milliGRAM(s) Oral daily  gabapentin 300 milliGRAM(s) Oral three times a day  ipratropium 17 MICROgram(s) HFA Inhaler 1 Puff(s) Inhalation every 6 hours  lamoTRIgine 100 milliGRAM(s) Oral daily  metoprolol tartrate 12.5 milliGRAM(s) Oral every 8 hours  multivitamin 1 Tablet(s) Oral daily  QUEtiapine 200 milliGRAM(s) Oral at bedtime  remdesivir  IVPB 100 milliGRAM(s) IV Intermittent every 24 hours  tiotropium 18 MICROgram(s) Capsule 1 Capsule(s) Inhalation daily    PRN MEDICATIONS  acetaminophen   Tablet .. 650 milliGRAM(s) Oral every 6 hours PRN  bisacodyl 5 milliGRAM(s) Oral every 12 hours PRN  LORazepam     Tablet 0.5 milliGRAM(s) Oral every 6 hours PRN        OBJECTIVE:  VITALS:   T(F): 97.4 (12-11-20 @ 00:00), Max: 97.4 (12-11-20 @ 00:00)  HR: 69 (12-11-20 @ 00:00) (56 - 72)  BP: 137/62 (12-11-20 @ 00:00) (117/85 - 166/79)  BP(mean): --  RR: 19 (12-11-20 @ 00:00) (19 - 20)  SpO2: 99% (12-11-20 @ 00:00) (90% - 100%)    I&O's:   I&O's Summary    09 Dec 2020 07:01  -  10 Dec 2020 07:00  --------------------------------------------------------  IN: 370 mL / OUT: 500 mL / NET: -130 mL    10 Dec 2020 07:01  -  11 Dec 2020 06:13  --------------------------------------------------------  IN: 480 mL / OUT: 0 mL / NET: 480 mL        PHYSICAL EXAM:        LABS:                        10.5   5.95  )-----------( 208      ( 10 Dec 2020 00:55 )             32.5             12-10    138  |  106  |  23<H>  ----------------------------<  172<H>  3.6   |  21  |  0.7    Ca    8.2<L>      10 Dec 2020 00:55  Phos  1.9     12-10  Mg     1.9     12-10                          CAPILLARY BLOOD GLUCOSE      POCT Blood Glucose.: 146 mg/dL (10 Dec 2020 16:31)        RADIOLOGY & ADDITIONAL TESTS:                       Throat: No oral lesions. No thrush. Moist mucous membranes.    Chest Wall:  Normal shape. Symmetric expansion with respiration. No tenderness.             Neck:  Trachea midline.           Lungs:  Clear to auscultation bilaterally. No wheezes. No rhonchi. No rales. Respirations regular, even and unlabored.            Heart:  Regular rhythm and normal rate. Normal S1 and S2. No murmur.     Abdomen:  Soft, non-tender and non-distended. Normal bowel sounds. No masses.  Extremities:  Moves all extremities well. No edema.           Pulses: Pulses palpable and equal bilaterally.               Skin: Dry. Intact. No bleeding. No rash.           Neuro: Moves all 4 extremities and cranial nerves grossly intact.  Psychiatric: Normal mood and affect.    Impression:  1. 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    New Medications Ordered This Visit   Medications   • QUEtiapine (SEROquel) 25 MG tablet     Sig: Take 2 tablets by mouth Every Night for 90 days.     Dispense:  60 tablet     Refill:  2         Plan:  Franklin does not have any features to suggest sleep disordered breathing.  His symptoms seem closely related to cessation of heavy alcohol usage in the past 8 months.  He actually describes creepy crawly sensation all over his body.  I would like to avoid a controlled substance such as Librium.  I will therefore start him on seroquel 25 mg tablets 1-2 qhs. If doesn't help use LIBRIUM at 10 to 25 mg and taper off over 3 to 6 months.    Patient will follow up in this clinic 6 weeks.    Thank you for allowing me to participate in your patient's care.    Electronically signed by Melchor Mckenna MD, 06/24/22, 8:41 AM EDT.    Part of this note may be an electronic transcription/translation of spoken language to printed text using the Dragon Dictation System.   SUBJECTIVE:   LENGTH OF HOSPITAL STAY: 12d    CHIEF COMPLAINT:  Patient is a 75y old  Female who presents with a chief complaint of Odynophagia associated with chest pain (10 Dec 2020 00:40)      Events over the past 24 hours:  Patient was seen and examined at the bedside this morning. Patient is lying comfortably on the bed. She is on O2 via nasal cannula at 3lpm and saturating 95%. There were no acute events overnight.     REVIEW OF SYSTEMS  Negative Except as mentioned above.    ALLERGIES:  codeine (Other (Moderate))  Depakote (Unknown)  Dilaudid (Short breath; Rash)  IV Contrast (Anaphylaxis)  losartan (Angioedema)  Risperdal (Other)  verapamil (Short breath; Angioedema)        MEDICATIONS:  STANDING MEDICATIONS  ALBUTerol    90 MICROgram(s) HFA Inhaler 2 Puff(s) Inhalation every 6 hours  aspirin  chewable 81 milliGRAM(s) Oral daily  atorvastatin 20 milliGRAM(s) Oral at bedtime  budesonide 160 MICROgram(s)/formoterol 4.5 MICROgram(s) Inhaler 2 Puff(s) Inhalation two times a day  chlorhexidine 4% Liquid 1 Application(s) Topical <User Schedule>  clopidogrel Tablet 75 milliGRAM(s) Oral daily  dabigatran 150 milliGRAM(s) Oral every 12 hours  dexAMETHasone  Injectable 6 milliGRAM(s) IV Push daily  famotidine    Tablet 20 milliGRAM(s) Oral daily  furosemide    Tablet 40 milliGRAM(s) Oral daily  gabapentin 300 milliGRAM(s) Oral three times a day  ipratropium 17 MICROgram(s) HFA Inhaler 1 Puff(s) Inhalation every 6 hours  lamoTRIgine 100 milliGRAM(s) Oral daily  metoprolol tartrate 12.5 milliGRAM(s) Oral every 8 hours  multivitamin 1 Tablet(s) Oral daily  QUEtiapine 200 milliGRAM(s) Oral at bedtime  remdesivir  IVPB 100 milliGRAM(s) IV Intermittent every 24 hours  tiotropium 18 MICROgram(s) Capsule 1 Capsule(s) Inhalation daily    PRN MEDICATIONS  acetaminophen   Tablet .. 650 milliGRAM(s) Oral every 6 hours PRN  bisacodyl 5 milliGRAM(s) Oral every 12 hours PRN  LORazepam     Tablet 0.5 milliGRAM(s) Oral every 6 hours PRN        OBJECTIVE:  VITALS:   T(F): 97.4 (12-11-20 @ 00:00), Max: 97.4 (12-11-20 @ 00:00)  HR: 69 (12-11-20 @ 00:00) (56 - 72)  BP: 137/62 (12-11-20 @ 00:00) (117/85 - 166/79)  BP(mean): --  RR: 19 (12-11-20 @ 00:00) (19 - 20)  SpO2: 99% (12-11-20 @ 00:00) (90% - 100%)    I&O's:   I&O's Summary    09 Dec 2020 07:01  -  10 Dec 2020 07:00  --------------------------------------------------------  IN: 370 mL / OUT: 500 mL / NET: -130 mL    10 Dec 2020 07:01  -  11 Dec 2020 06:13  --------------------------------------------------------  IN: 480 mL / OUT: 0 mL / NET: 480 mL        PHYSICAL EXAM:  General: Not in distress; Pallor (-), Icterus (-), Cyanosis (-), Clubbing (-)  HEENT: Pupils equal, round and reactive to light symmetrically, EOM - Normal bilaterally; Hearing - b/l normal; No external discharge noted, JVD (-), Lymphadenopathy(-)  PULM: Bilaterally equal and clear breath sounds, no wheeze, rubs or crackles.   CVS: Normal S1 and S2, no murmurs, rubs, or gallops.   GI: Soft, nondistended, nontender, BS +  MSK: Edema (+), no joint or muscle tenderness  SKIN: Warm and well perfused, no rashes noted  NEURO:  Alert and Oriented x 3, but confused; Cranial Nerves are all grossly intact; Normal strength and sensation in all four extremities.        LABS:                        10.5   5.95  )-----------( 208      ( 10 Dec 2020 00:55 )             32.5             12-10    138  |  106  |  23<H>  ----------------------------<  172<H>  3.6   |  21  |  0.7    Ca    8.2<L>      10 Dec 2020 00:55  Phos  1.9     12-10  Mg     1.9     12-10                          CAPILLARY BLOOD GLUCOSE      POCT Blood Glucose.: 146 mg/dL (10 Dec 2020 16:31)        RADIOLOGY & ADDITIONAL TESTS:                       SUBJECTIVE:   LENGTH OF HOSPITAL STAY: 12d    CHIEF COMPLAINT:  Patient is a 75y old  Female who presents with a chief complaint of Odynophagia associated with chest pain (10 Dec 2020 00:40)      Events over the past 24 hours:  Patient was seen and examined at the bedside this morning. Patient is lying comfortably on the bed. She is on O2 via nasal cannula at 3lpm and saturating 95%. There were no acute events overnight.     REVIEW OF SYSTEMS  Negative Except as mentioned above.    ALLERGIES:  codeine (Other (Moderate))  Depakote (Unknown)  Dilaudid (Short breath; Rash)  IV Contrast (Anaphylaxis)  losartan (Angioedema)  Risperdal (Other)  verapamil (Short breath; Angioedema)        MEDICATIONS:  STANDING MEDICATIONS  ALBUTerol    90 MICROgram(s) HFA Inhaler 2 Puff(s) Inhalation every 6 hours  aspirin  chewable 81 milliGRAM(s) Oral daily  atorvastatin 20 milliGRAM(s) Oral at bedtime  budesonide 160 MICROgram(s)/formoterol 4.5 MICROgram(s) Inhaler 2 Puff(s) Inhalation two times a day  chlorhexidine 4% Liquid 1 Application(s) Topical <User Schedule>  clopidogrel Tablet 75 milliGRAM(s) Oral daily  dabigatran 150 milliGRAM(s) Oral every 12 hours  dexAMETHasone  Injectable 6 milliGRAM(s) IV Push daily  famotidine    Tablet 20 milliGRAM(s) Oral daily  furosemide    Tablet 40 milliGRAM(s) Oral daily  gabapentin 300 milliGRAM(s) Oral three times a day  ipratropium 17 MICROgram(s) HFA Inhaler 1 Puff(s) Inhalation every 6 hours  lamoTRIgine 100 milliGRAM(s) Oral daily  metoprolol tartrate 12.5 milliGRAM(s) Oral every 8 hours  multivitamin 1 Tablet(s) Oral daily  QUEtiapine 200 milliGRAM(s) Oral at bedtime  remdesivir  IVPB 100 milliGRAM(s) IV Intermittent every 24 hours  tiotropium 18 MICROgram(s) Capsule 1 Capsule(s) Inhalation daily    PRN MEDICATIONS  acetaminophen   Tablet .. 650 milliGRAM(s) Oral every 6 hours PRN  bisacodyl 5 milliGRAM(s) Oral every 12 hours PRN  LORazepam     Tablet 0.5 milliGRAM(s) Oral every 6 hours PRN        OBJECTIVE:  VITALS:   T(F): 97.4 (12-11-20 @ 00:00), Max: 97.4 (12-11-20 @ 00:00)  HR: 69 (12-11-20 @ 00:00) (56 - 72)  BP: 137/62 (12-11-20 @ 00:00) (117/85 - 166/79)  BP(mean): --  RR: 19 (12-11-20 @ 00:00) (19 - 20)  SpO2: 99% (12-11-20 @ 00:00) (90% - 100%)    I&O's:   I&O's Summary    09 Dec 2020 07:01  -  10 Dec 2020 07:00  --------------------------------------------------------  IN: 370 mL / OUT: 500 mL / NET: -130 mL    10 Dec 2020 07:01  -  11 Dec 2020 06:13  --------------------------------------------------------  IN: 480 mL / OUT: 0 mL / NET: 480 mL        PHYSICAL EXAM:  General: Not in distress; Pallor (-), Icterus (-), Cyanosis (-), Clubbing (-)  HEENT: Pupils equal, round and reactive to light symmetrically, Tender, firm lump on left side of neck, EOM - Normal bilaterally; Hearing - b/l normal; No external discharge noted, JVD (-), Lymphadenopathy(+)  PULM: Bilaterally equal and clear breath sounds, no wheeze, rubs or crackles.   CVS: Normal S1 and S2, no murmurs, rubs, or gallops.   GI: Soft, nondistended, nontender, BS +  MSK: Edema (+), no joint or muscle tenderness  SKIN: Warm and well perfused, no rashes noted  NEURO:  Alert and Oriented x 3, but confused; Cranial Nerves are all grossly intact; Normal strength and sensation in all four extremities.        LABS:                        10.5   5.95  )-----------( 208      ( 10 Dec 2020 00:55 )             32.5             12-10    138  |  106  |  23<H>  ----------------------------<  172<H>  3.6   |  21  |  0.7    Ca    8.2<L>      10 Dec 2020 00:55  Phos  1.9     12-10  Mg     1.9     12-10  CAPILLARY BLOOD GLUCOSE  POCT Blood Glucose.: 146 mg/dL (10 Dec 2020 16:31)  RADIOLOGY & ADDITIONAL TESTS:

## 2022-06-27 PROBLEM — J18.9 PNA (PNEUMONIA): Status: ACTIVE | Noted: 2022-01-01

## 2022-06-27 NOTE — PLAN
[FreeTextEntry1] : PNA/COPD Complete Augmentin , c/w maintenance inhalers, 02 \par Adhere to all medications including demonstrating proper use of nebulizers.\par Increase activity as tolerated and maintain optimal activity levels. Continue coughing and deep breathing exercises including use of Incentive Spirometry.\par Maintain proper nutrition and adequate hydration.\par Notify NP for worsening symptoms including fever, chills, SOB, CP, increased cough and secretions.\par afib- c/w Emery SIMS \par PCP/pulmonary follow up \par \par

## 2022-06-27 NOTE — HISTORY OF PRESENT ILLNESS
[Home] : at home, [unfilled] , at the time of the visit. [Other Location: e.g. Home (Enter Location, City,State)___] : at [unfilled] [Family Member] : family member [Verbal consent obtained from patient] : the patient, [unfilled] [FreeTextEntry1] : follow up hospital dc PNA [de-identified] : Patient is a 76 y/o male enrolled in the STARS program with a PMH of COPD (on home 02) afib on Eliquis, HTN amd HLD recently discharged from [facility] for PNA , she was treated with IV abx and dc home on Augmentin. Pt observed via tele health alert in NAD denies c/p, sob, fever, NVD c/o cough pt had PCP follow up 6/24. \par \par Hospital Course copied from [facility] : 77-year-old female from home with past medical history atrial fibrillation on Eliquis, TIA x 3, HTN, HLD, COPD on 4 L nasal cannula, septic shock October 2021, NSTEMI, status post catheterization showing non-obstructive coronary artery disease presents to the ED with coughing and shortness of breath/ FEVER 102\par Patient admitted to medical floor for-\par #PNA / UTI sepsis on admission ( tachycardia / fever/ leukocytosis ) \par #CKD3a\par - CTAP: Right lower lobe patchy opacification. \par - UA: LE+ nitrite +, pyuria and bacteria\par - UCx: Ecoli\par -Blood Cx- NGTD\par - gave tylenol prn fever\par - ID consult appreciated, narrow cefepime to ceftriaxone 1g daily .- can finish 7 day course with PO Augmentin 875/125 mg BID on discharge

## 2022-06-27 NOTE — PHYSICAL EXAM
[No Acute Distress] : no acute distress [Well-Appearing] : well-appearing [No Respiratory Distress] : no respiratory distress  [No Accessory Muscle Use] : no accessory muscle use [Non Tender] : non-tender [Normal Affect] : the affect was normal [Alert and Oriented x3] : oriented to person, place, and time [Normal Insight/Judgement] : insight and judgment were intact

## 2022-06-27 NOTE — COUNSELING
[de-identified] : Pt was informed about CN’s role/ STARS program and overview of transitional care reviewed with patient. Pt educated on topics of importance such as compliance with prescribed medication regimen, COPD / PNA action plan and escalation process, adequate hydration, and proper diet. Pt encouraged calling CN with any issues, concerns or questions, also educated to notify CN if experiencing CP, SOB, cough, increased mucus production, increased use of rescue inhaler, fever, chills, fatigue, “chest cold symptoms” dizziness, lightheadedness, n/v/d/c, swelling to extremities and/or any signs of COPD exacerbation/flare as reviewed. Reassurance provided. Will continue to monitor\par \par

## 2022-06-27 NOTE — ASSESSMENT
[FreeTextEntry1] : Patient is a 78 y/o male enrolled in the STARS program with a PMH of COPD (on home 02) afib on Eliquis, HTN amd HLD recently discharged from [facility] for PNA , she was treated with IV abx and dc home on Augmentin. Pt observed via tele health alert in NAD denies c/p, sob, fever, NVD c/o cough pt had PCP follow up 6/24

## 2022-07-12 PROBLEM — U07.1 PNEUMONIA DUE TO COVID-19 VIRUS: Status: ACTIVE | Noted: 2021-03-23

## 2022-07-12 NOTE — ASSESSMENT
[FreeTextEntry1] : A:\par COPD  \par COVID pneumonia\par hypoxia\par plan:\par Stress compliance with inhalers. Renewed as needed.\par cont PRN albuterol\par cont ICS/LABA\par O2 24/7 the patient can take the oxygen off while relaxing at rest.  She needs to continue it during exercise and sleep\par weight loss\par \par A:\par FLAVIO\par Obesity\par \par PLAN:\par The patient is benefitting from the PAP device .\par New supplies ordered \par Weight loss discussed\par I stressed the need maintain compliance  with the PAP device.\par The patient is not to use an Ozone or UV sterilizer. \par \par  aggressive care by cardiology.\par \par Long discussions about the COVID infection and recurrence.  She is concerned about her family visiting and not wearing masks\par \par \par \par \par

## 2022-07-12 NOTE — REASON FOR VISIT
[Follow-Up - From Hospitalization] : a follow-up visit after a recent hospitalization [COPD] : COPD [TextBox_44] : Recently in the hospital again.  She woke up nausea vomiting.  Had severe urinary tract infection.  She was evaluated for appendicitis at the time.  She had an element of fluid overload as well

## 2022-07-12 NOTE — HISTORY OF PRESENT ILLNESS
[TextBox_4] : I personally reviewed the hospital record and I interpreted the most recent available chest films from the hospital.\par \par I reviewed and interpreted any  OP CXR or CT available in the files\par I discussed the results today with the patient.\par \par

## 2022-07-26 NOTE — PATIENT PROFILE ADULT - FUNCTIONAL SCREEN CURRENT LEVEL: SWALLOWING (IF SCORE 2 OR MORE FOR ANY ITEM, CONSULT REHAB SERVICES), MLM)
Pedrocherry Toro (Ba: [id number])    Need help? Call us at [phone]    Status   Sent to [facility]  [employer]   The plan will fax you a determination, typically within 1 to 5 business days  How do I follow up?     Drug    Trulicity 9 60CI/2 4HB pen-injectors   Form    [facility] Medicaid Diabetes Prior Authorization Form    Prior Authorization for Diabetes Agents for [facility]      [phone]  phone      [phone]  fax
0 = swallows foods/liquids without difficulty

## 2022-08-09 NOTE — DISCUSSION/SUMMARY
[FreeTextEntry1] : \par History htn copd emphysema inga sees now Alexander.  . In hosp 11/18 hypotesion.. Was on verapramil Now off. Use to smoke. LVH EF 55%. Told need 2 gm na diet. Now edema resolved.  Had TIA. On home O2. She 12/20 covid pneumonia . She was on vent. Had neg stress in [place] 10/20. L carotid done by maddie 12/5/20.  No change weight.  Told resume  weight loss. ? pulmonary rehab.. Memory better.  Told try walk.  Mass neck. Told benign. To not remove per ENT now\par Admitted 10/21 sepsis uti pneumonia. Cath 10/21 no significant cad. Patient sob off o2.\par  Ef 55- 60%. She had 3 covid vaccines. She has afib on AC. If off o2 sat as llow 76%. Aware need always wear o2. Told daily weight

## 2022-08-09 NOTE — HISTORY OF PRESENT ILLNESS
[FreeTextEntry1] :  Dyspnea on exertion and fatigue, but no chest pain and no edema. On now 24 hour o2\par Admitted 10/21 sepsis uti pneumonia. Cath 10/21 no significant cad. If off o2 sat as llow 76%. Very \par OB off o2\par \par \par \par \par \par \par

## 2022-08-11 NOTE — HISTORY OF PRESENT ILLNESS
[FreeTextEntry1] : 78 y/o female with h/o COPD on home oxygen, CHF, admitted to [facility] for TIA, symptoms of slurred speech in 2020,  Work up revealed left ICA high-grade stenosis and underwent left TCAR in December 2020. She c/o coldness and discoloration to left hand, arterial duplex of upper extremities done on 6/22/22 that was normal.

## 2022-08-11 NOTE — ASSESSMENT
[FreeTextEntry1] : 76 y/o female with h/o COPD on home oxygen, with h/o TIA, and underwent left TCAR in December 2020. She c/o coldness and discoloration to left hand, arterial duplex of upper extremities done on 6/22/22 that was normal. \par \par Carotid duplex showed <50% right carotid stenosis and patent left carotid stent, patent subclavian arteries.\par \par I have informed her of test results and advised follow up in six months time.

## 2022-08-11 NOTE — DATA REVIEWED
[FreeTextEntry1] : I performed a carotid duplex which was medically necessary to evaluate for patency of the carotid stent. It showed <50% right carotid stenosis and patent left carotid stent, patent subclavian arteries.\par

## 2022-08-23 NOTE — ED PROVIDER NOTE - CARE PROVIDERS DIRECT ADDRESSES
[email or web address]

## 2022-08-23 NOTE — ED PROVIDER NOTE - CARE PROVIDER_API CALL
Juno Galvin)  Urology  [address] [place] [address]  Phone: [phone]  Fax: [phone]  Follow Up Time: 1-3 Days    Lita Bonner)  Obstetrics and Gynecology  [address] [place] [address]  Phone: [phone]  Fax: [phone]  Follow Up Time: 1-3 Days

## 2022-08-23 NOTE — ED PROVIDER NOTE - PROVIDER TOKENS
PROVIDER:[TOKEN:[[id number]],FOLLOWUP:[1-3 Days]],PROVIDER:[TOKEN:[[id number]],FOLLOWUP:[1-3 Days]]

## 2022-08-23 NOTE — ED ADULT NURSE NOTE - NSIMPLEMENTINTERV_GEN_ALL_ED
Implemented All Fall with Harm Risk Interventions:  [place] to call system. Call bell, personal items and telephone within reach. Instruct patient to call for assistance. Room bathroom lighting operational. Non-slip footwear when patient is off stretcher. Physically safe environment: no spills, clutter or unnecessary equipment. Stretcher in lowest position, wheels locked, appropriate side rails in place. Provide visual cue, wrist band, yellow gown, etc. Monitor gait and stability. Monitor for mental status changes and reorient to person, place, and time. Review medications for side effects contributing to fall risk. Reinforce activity limits and safety measures with patient and family. Provide visual clues: red socks.

## 2022-08-23 NOTE — ED PROVIDER NOTE - CLINICAL SUMMARY MEDICAL DECISION MAKING FREE TEXT BOX
77-year-old female with a past medical history of atrial fibrillation, COPD on home oxygen, CHF, peripheral vascular disease, bipolar disorder, hypertension, presents with burning with urination for 1 month.  Has been treated for multiple UTIs over the past few months and feels like her symptoms have never gotten better.  Denies any hematuria, flank pain, abdominal pain, fever, or any other symptoms.  Mainly concerned about the burning.  Also concerned about lumps near her vagina.  No vaginal bleeding or discharge.  On exam nontoxic, vital signs stable, heart regular no murmurs, lungs clear bilaterally, no CVA tenderness, abdomen benign, chaperoned by CHARLES García has 2 papules to right labia majora but no surrounding erythema or fluctuance or tenderness.  No signs of infection there.  Labs with normal white count, stable anemia, similar mild elevated alk phos and mild transaminitis.  Otherwise reassuring.  UA with no significant pyuria but with symptoms we will treat.  We will referred for urology given states that she also has been told she has abnormal cystoscopy findings and this may be contributing to her dysuria. In my opinion, based on current evaluation and results, an acute medical or surgical emergency does not appear to be occurring at this time and I feel that the pt is stable for further outpatient work up and/or treatment. Return precautions discussed.

## 2022-08-23 NOTE — ED PROVIDER NOTE - PATIENT PORTAL LINK FT
You can access the FollowMyHealth Patient Portal offered by [facility] by registering at the following website: [email or web address]. By joining [employer]’s FollowMyHealth portal, you will also be able to view your health information using other applications (apps) compatible with our system.

## 2022-08-23 NOTE — ED PROVIDER NOTE - PHYSICAL EXAMINATION
CONSTITUTIONAL: Well-developed; well-nourished; in no acute distress.   SKIN: Warm, dry  HEAD: Normocephalic; atraumatic  EYES: PERRL, EOMI, normal sclera and conjunctiva   ENT: No nasal discharge; airway clear. Dry mucous membranes  NECK: Supple; non tender.  CARD:  Regular rate and rhythm. Normal S1, S2. 2+ distal pulses. 2+ pitting edema BLE  RESP: No increased WOB. CTA b/l without wheezes, crackles, rhonchi  ABD: Normoactive BS. Soft, nontender, nondistended. No CVA tenderness.  EXT: Normal ROM.   LYMPH: No acute cervical adenopathy.  NEURO: Alert, oriented, grossly unremarkable  PSYCH: Cooperative, appropriate.

## 2022-08-23 NOTE — ED PROVIDER NOTE - OBJECTIVE STATEMENT
76 y/o F with PMH Afib, COPD on home O2, TIA, CHF, PVD, HTN, HLD, FLAVIO, bipolar dx presenting for burning with urination since beginning of this month. Pt states she has been treated twice for UTIs since May of this year and feels like her symptoms never fully resolve/come back shortly after treatment. States she discussed with her PCP this month and was not tested for UTI or treated. Last abx course was Augmentin last month for both UTI and pneumonia. Denies hematuria, abd pain, fever, back pain, N/V

## 2022-08-31 PROBLEM — N28.1 SIMPLE CYST OF KIDNEY: Status: ACTIVE | Noted: 2022-01-01

## 2022-10-18 NOTE — H&P ADULT - NS ATTEND AMEND GEN_ALL_CORE FT
HPI  - Patient p/w dysuria for last couple of days, weakness, syncope, fall with left periorbital contusion, and left knee pain     AP  Fall/syncope/dehydration/UTI rule out cardiac etiology   - see plan above

## 2022-10-18 NOTE — PATIENT PROFILE ADULT - FUNCTIONAL ASSESSMENT - BASIC MOBILITY 6.
1-calculated by average/Not able to assess (calculate score using [facility] averaging method)

## 2022-10-18 NOTE — PHYSICAL THERAPY INITIAL EVALUATION ADULT - GENERAL OBSERVATIONS, REHAB EVAL
18:35-19:00. Chart reviewed; confirmed with RN to see the pt for PT. Pt ready for PT; received in bed with complain of pain in L knee. +IV L UE, +tele, +prima fit, +O2 via NC. Agreeable for PT evaluation.

## 2022-10-18 NOTE — H&P ADULT - NSHPLABSRESULTS_GEN_ALL_CORE
10-18    138  |  102  |  20  ----------------------------<  100<H>  4.3   |  25  |  1.2    Ca    10.1      18 Oct 2022 07:17    TPro  8.1<H>  /  Alb  4.2  /  TBili  0.4  /  DBili  x   /  AST  69<H>  /  ALT  31  /  AlkPhos  538<H>  10-18                            11.5   7.21  )-----------( 265      ( 18 Oct 2022 07:17 )             36.2     CAPILLARY BLOOD GLUCOSE      POCT Blood Glucose.: 101 mg/dL (18 Oct 2022 07:26)    CARDIAC MARKERS ( 18 Oct 2022 07:17 )  x     / <0.01 ng/mL / 68 U/L / x     / x    < from: Xray Shoulder 2 Views, Bilateral (10.18.22 @ 10:27) >    Impression:    Osteopenia without evidence of an acute fracture.    Both shoulders are high riding with degenerative changes as described   above.    < end of copied text >    < from: Xray Hand 3 Views, Left (10.18.22 @ 10:26) >      Impression:    Osteopenia without evidence of an acute fracture.    Degenerative disease as above.    < end of copied text >    < from: Xray Ankle Complete 3 Views, Bilateral (10.18.22 @ 10:18) >      Impression:    Osteopenia without evidence of an acute fracture.    Tiny plantar calcaneal spur.    < end of copied text >    < from: Xray Chest 1 View AP/PA (10.18.22 @ 10:17) >      Impression:    Low lung volume.    Bilateral interstitial opacities, unchanged.    < from: CT Chest No Cont (10.18.22 @ 08:04) >      IMPRESSION:  1.  No evidence of acute traumatic injury to the chest, abdomen or pelvis   on this unenhanced exam.  2.  Severe emphysema with biapical bullae again noted, not appreciably   different from the prior exam.    < end of copied text >    < from: CT Maxillofacial No Cont (10.18.22 @ 09:00) >    IMPRESSION:    1.  No acute displaced facial bone fracture demonstrated.    2.  Mild left frontal extracalvarial soft tissue swelling.    < end of copied text >    < from: 12 Lead ECG (10.18.22 @ 06:26) >        < end of copied text >    < from: 12 Lead ECG (10.18.22 @ 06:26) >        < end of copied text >

## 2022-10-18 NOTE — ED ADULT NURSE NOTE - NSIMPLEMENTINTERV_GEN_ALL_ED
Implemented All Fall with Harm Risk Interventions:  [place] to call system. Call bell, personal items and telephone within reach. Instruct patient to call for assistance. Room bathroom lighting operational. Non-slip footwear when patient is off stretcher. Physically safe environment: no spills, clutter or unnecessary equipment. Stretcher in lowest position, wheels locked, appropriate side rails in place. Provide visual cue, wrist band, yellow gown, etc. Monitor gait and stability. Monitor for mental status changes and reorient to person, place, and time. Review medications for side effects contributing to fall risk. Reinforce activity limits and safety measures with patient and family. Provide visual clues: red socks.

## 2022-10-18 NOTE — H&P ADULT - ASSESSMENT
77-year-old female from home with past medical history atrial fibrillation on Eliquis, TIA x 3, HTN, HLD, COPD on 4 L nasal cannula, FLAVIO, septic shock October 2021, NSTEMI, status post catheterization showing non-obstructive coronary artery disease presents after a fall.  +LOC +AC use.     #HTN  - c/w norvasc    #h/o of multiple TIA  #h/o of chronic persistent afib  - c/w metoprolol and Eliquis and statin  - TTE (10/21/21): EF 55-60%, mod pHTN, no valvular or wall motion abn    #COPD  #FLAVIO  - continue Albuterol MDI, Singular, Tiotropium  - c/w cpap as needed  - O2 nc 4L ( home dose )  - duoneb q6     #depression  continue Seroquel    #HLD  - continue Statin    #OA  - tylenol prn pain 1-3  -PT   77-year-old female from home with past medical history atrial fibrillation on Eliquis, TIA x 3, HTN, HLD, COPD on 4 L nasal cannula, FLAVIO, septic shock October 2021, NSTEMI, status post catheterization showing non-obstructive coronary artery disease presents after a fall.  +LOC +AC use.     # s/p FALL - SYNCOPE   ETIOLOGY : cardiac vs neuro vs mechanical vs infection   - UA/ urine cx ( known to have uti in past )  - fall / sz precaution   - PT eval     #HTN  - c/w norvasc    #h/o of multiple TIA  #h/o of chronic persistent afib  - c/w metoprolol and Eliquis and statin  - TTE (10/21/21): EF 55-60%, mod pHTN, no valvular or wall motion abn    #COPD  #FLAVIO  - continue Albuterol MDI, Singular, Tiotropium  - c/w cpap as needed  - O2 nc 4L ( home dose )  - duoneb q6   - symbicort will inpt , ( pt on breo and Elipita )     #depression  continue Seroquel    #HLD  - continue Statin    #OA  - tylenol prn pain 1-3     77-year-old female from home with past medical history atrial fibrillation on Eliquis, TIA x 3, HTN, HLD, COPD on 4 L nasal cannula, FLAVIO, septic shock October 2021, NSTEMI, status post catheterization showing non-obstructive coronary artery disease presents after a fall.  +LOC +AC use.     # s/p FALL - SYNCOPE   ETIOLOGY : cardiac vs neuro vs mechanical vs infection ( fever 101 in ER )    - UA/ urine cx ( known to have uti in past )  - RVP panel   - fall / sz precaution   - PT eval   - echo   - routine EEG  - neuro checks q8   - pt eval   - trop 2 more sets   - tele monitoring  - orthostatic vitals   - IVf 75cc /hr x 8 hours      #HTN  - c/w BB  - low sodium diet   - monitor vitals     # h/o of multiple TIA  # h/o of chronic persistent afib  # HLD  - c/w metoprolol and Eliquis and statin  - TTE (10/21/21): EF 55-60%, mod pHTN, no valvular or wall motion abn  - tele monitoring     #COPD  #FLAVIO  - continue Albuterol MDI, Singular,   - c/w cpap as needed  - O2 nc 4L ( home dose )  - duoneb q6 prn  - symbicort will inpt , ( pt on breo and Elipita )     #depression/ anxiety   -continue Seroquel qhs/ ativan prn/ apple    #OA  - tylenol prn pain 1-3    d/w Dr. Alfonso     77-year-old female from home with past medical history atrial fibrillation on Eliquis, TIA x 3, HTN, HLD, COPD on 4 L nasal cannula, FLAVIO, septic shock October 2021, NSTEMI, status post catheterization showing non-obstructive coronary artery disease presents after a fall.  +LOC +AC use.     # s/p FALL - SYNCOPE   ETIOLOGY : cardiac vs neuro vs mechanical vs infection ( fever 101 in ER )    - UA/ urine cx ( known to have uti in past )  - RVP panel   - fall / sz precaution   - PT eval   - echo   - routine EEG  - neuro checks q8   - pt eval   - trop 2 more sets   - tele monitoring  - orthostatic vitals   - IVf 75cc /hr x 8 hours     # elevated AP   can be from fall , pt has no abd pain   - repeat in am      #HTN  - c/w BB  - low sodium diet   - monitor vitals     # h/o of multiple TIA  # h/o of chronic persistent afib  # HLD  - c/w metoprolol and Eliquis and statin  - TTE (10/21/21): EF 55-60%, mod pHTN, no valvular or wall motion abn  - tele monitoring     #COPD  #FLAVIO  - continue Albuterol MDI, Singular,   - c/w cpap as needed  - O2 nc 4L ( home dose )  - duoneb q6 prn  - symbicort will inpt , ( pt on breo and Elipita )     #depression/ anxiety   -continue Seroquel qhs/ ativan prn/ apple    #OA  - tylenol prn pain 1-3    d/w Dr. Alfonso     77-year-old female from home with past medical history atrial fibrillation on Eliquis, TIA x 3, HTN, HLD, COPD on 4 L nasal cannula, FLAVIO, septic shock October 2021, NSTEMI, status post catheterization showing non-obstructive coronary artery disease presents after a fall.  +LOC +AC use.     Syncope and collapse likely r/w UTI and dehydration, ruled out cardiac etiology    - UA positive/ pending urine cx ( known to have uti in past ), start CTX x 3 days   - RVP panel   - fall / sz precaution   - PT eval   - obtain echo   - routine EEG  - neuro checks q8   - pt eval   - serial troponin, trop x 1 -ve   - tele monitoring  - orthostatic vitals   - IVf 75cc /hr x 8 hours     Fall, left knee pain likely contusoin  - obtain XR of left knee  - pan CT scans -ve for facture  - XR R knee shoulder -VE   - prn oxycodone     Left eye contusion  - no vision loss  - monitor     Elevated ALP   - likely dehydration, iv fluids, monitor    can be from fall , pt has no abd pain   - repeat in am      HTN  - c/w BB  - low sodium diet   - monitor vitals     Hx//o of multiple TIA  # h/o of chronic persistent afib  # HLD  - c/w metoprolol and Eliquis and statin  - TTE (10/21/21): EF 55-60%, mod pHTN, no valvular or wall motion abn  - tele monitoring     #COPD with chronic respiratory failure   #FLAVIO  - continue Albuterol MDI, Singular,   - c/w cpap as needed  - O2 nc 4L ( home dose )  - duoneb q6 prn  - symbicort will inpt , ( pt on breo Elipita )   - CT chest reviewed     Depression/ anxiety   -continue Seroquel qhs/ ativan prn/ apple  - lamictal     #OA  - tylenol prn pain 1-3    DVT px  - eliquis    Full code     d/w Dr. Alfonso     77-year-old female from home with past medical history atrial fibrillation on Eliquis, TIA x 3, HTN, HLD, COPD on 4 L nasal cannula, FLAVIO, septic shock October 2021, NSTEMI, status post catheterization showing non-obstructive coronary artery disease presents after a fall.  +LOC +AC use.     Syncope and collapse likely r/w UTI and dehydration, ruled out cardiac etiology    - UA positive/ pending urine cx ( known to have uti in past ), start CTX x 3 days   - RVP panel   - fall / sz precaution   - PT eval   - obtain echo   - routine EEG  - neuro checks q8   - pt eval   - serial troponin, trop x 1 -ve   - tele monitoring  - orthostatic vitals   - IVf 75cc /hr x 8 hours     Fall, left knee pain likely contusoin  - obtain XR of left knee  - pan CT scans -ve for facture  - XR R knee shoulder -VE   - ketorolac ? allergic to codeine and dilaudid      Left eye contusion  - no vision loss  - monitor     Elevated ALP   - likely dehydration, iv fluids, monitor    can be from fall , pt has no abd pain   - repeat in am      HTN  - c/w BB  - low sodium diet   - monitor vitals     Hx//o of multiple TIA  # h/o of chronic persistent afib  # HLD  - c/w metoprolol and Eliquis and statin  - TTE (10/21/21): EF 55-60%, mod pHTN, no valvular or wall motion abn  - tele monitoring     #COPD with chronic respiratory failure   #FLAVIO  - continue Albuterol MDI, Singular,   - c/w cpap as needed  - O2 nc 4L ( home dose )  - duoneb q6 prn  - symbicort will inpt , ( pt on breo Elipita )   - CT chest reviewed     Depression/ anxiety   -continue Seroquel qhs/ ativan prn/ apple  - lamictal     #OA  - tylenol prn pain 1-3    DVT px/GI px  - eliquis/PPI po     Full code     d/w Dr. Alfonso    MEDICATIONS  (STANDING):  apixaban 5 milliGRAM(s) Oral every 12 hours  aspirin  chewable 81 milliGRAM(s) Oral daily  atorvastatin 20 milliGRAM(s) Oral at bedtime  budesonide 160 MICROgram(s)/formoterol 4.5 MICROgram(s) Inhaler 2 Puff(s) Inhalation two times a day  cefTRIAXone   IVPB 1000 milliGRAM(s) IV Intermittent every 24 hours  chlorhexidine 2% Cloths 1 Application(s) Topical <User Schedule>  gabapentin 400 milliGRAM(s) Oral two times a day  ketorolac   Injectable 15 milliGRAM(s) IV Push once  lamoTRIgine 100 milliGRAM(s) Oral two times a day  metoprolol tartrate 75 milliGRAM(s) Oral two times a day  montelukast 10 milliGRAM(s) Oral daily  pantoprazole    Tablet 40 milliGRAM(s) Oral before breakfast  QUEtiapine 200 milliGRAM(s) Oral at bedtime  QUEtiapine 50 milliGRAM(s) Oral at bedtime  sodium chloride 0.9%. 1000 milliLiter(s) (75 mL/Hr) IV Continuous <Continuous>    MEDICATIONS  (PRN):  acetaminophen     Tablet .. 650 milliGRAM(s) Oral every 6 hours PRN Mild Pain (1 - 3)  ALBUTerol    90 MICROgram(s) HFA Inhaler 2 Puff(s) Inhalation every 8 hours PRN Bronchospasm  albuterol/ipratropium for Nebulization 3 milliLiter(s) Nebulizer every 6 hours PRN Bronchospasm  LORazepam     Tablet 0.5 milliGRAM(s) Oral two times a day PRN Anxiety  ondansetron Injectable 4 milliGRAM(s) IV Push every 8 hours PRN Nausea and/or Vomiting  polyethylene glycol 3350 17 Gram(s) Oral daily PRN Constipation

## 2022-10-18 NOTE — ED PROVIDER NOTE - OBJECTIVE STATEMENT
77-year-old female from home with past medical history atrial fibrillation on Eliquis, TIA x 3, HTN, HLD, COPD on 4 L nasal cannula, septic shock October 2021, NSTEMI, status post catheterization showing non-obstructive coronary artery disease presents after a fall.  +HT +LOC +AC use. Patient got up to use the bathroom with her walker and then she does not remember anything other than ending up on the floor face first.  Her  heard but woke up and found her laying face first on the ground.  Patient does not remember feeling chest pain shortness of breath headache or pain anywhere prior to falling.  She is currently having pain on her right shoulder left shoulder left knee right knee left leg left hip and left hand. Patient is having difficulty walking and is currently unable to get up. Denies any nausea vomiting fever chills chest pain shortness of breath back pain abdominal pain urinary symptoms diarrhea numbness weakness.

## 2022-10-18 NOTE — ED PROVIDER NOTE - PHYSICAL EXAMINATION
CONSTITUTIONAL: laying in bed conversing with , frail  and ill appearing  SKIN: warm, dry  HEAD: Normocephalic left frontal hematoma  EYES: no conjunctival erythema eomi peerla 3mm b/l  ENT: no nasal discharge, airway clear  NECK: full ROM, non-tender  CARD: regular rate and rhythm  RESP: normal respiratory effort, no wheezes, rales or rhonchi  ABD: soft, non-distended, non-tender  back: no midline tenderness or bruising   EXT: moving all extremities spontaneously, b/l shoulder tenderness, left tib fib tenderness, left hip tenderness, able to move all extremities, left LE difficult moving due to left hip pain +2 dp and radial pulses  NEURO: alert and oriented x3, strength and sensation 5/5 b/l UE and LE, CN2-12 intact, finger to nose normal b/l, unable to ambulate due to pain  PSYCH: cooperative, appropriate

## 2022-10-18 NOTE — ED PROVIDER NOTE - CLINICAL SUMMARY MEDICAL DECISION MAKING FREE TEXT BOX
77-year-old female with past medical history of A. fib on Eliquis bipolar CHF COPD on 5 L hyperlipidemia hypertension FLAVIO on CPAP peripheral vascular disease presenting here complaining status post syncopal fall.  Patient states she was getting up to go to the bathroom with her walker walked to the bathroom and as she was coming back suddenly found herself on the floor positive head trauma positive LOC unaware of how long she was on the floor unable to ambulate after fall currently complaining of right shoulder pain and bilateral lower extremity pain as well as back pain denies blurry vision dizziness denies chest pain however states has been having shortness of breath over the last few weeks worsening when she walks was planning on speaking to her pulmonologist Dr. Vickers but has not had a chance to get no nausea vomiting or abdominal pain.  vs reviewed labs imaging ekg obtained and reviewed, patient admitted for inability to ambulate after falls and possible syncopal event

## 2022-10-18 NOTE — PHYSICAL THERAPY INITIAL EVALUATION ADULT - LIVES WITH, PROFILE
Pt lives with , daughter lives upstairs in a private home with ~ 4 steps to enter with R handrail going up./spouse

## 2022-10-18 NOTE — H&P ADULT - NSHPPHYSICALEXAM_GEN_ALL_CORE
Vital Signs Last 24 Hrs  T(C): 37.2 (18 Oct 2022 08:55), Max: 38.4 (18 Oct 2022 06:20)  T(F): 99 (18 Oct 2022 08:55), Max: 101.2 (18 Oct 2022 06:20)  HR: 95 (18 Oct 2022 08:55) (95 - 101)  BP: 152/67 (18 Oct 2022 08:55) (146/65 - 152/67)  RR: 19 (18 Oct 2022 08:55) (19 - 20)  SpO2: 97% (18 Oct 2022 08:55) (97% - 98%)    Parameters below as of 18 Oct 2022 08:55  Patient On (Oxygen Delivery Method): nasal cannula  O2 Flow (L/min): 3    CONSTITUTIONAL: laying in bed , frail  and ill appearing  SKIN: warm, dry  HEAD: Normocephalic left frontal hematoma  EYES: no conjunctival erythema eomi   ENT: no nasal discharge, airway clear  NECK: full ROM, non-tender  CARD: regular rate and rhythm  RESP: normal respiratory effort, no wheezes, rales or rhonchi  ABD: soft, non-distended, non-tender  back: no midline tenderness or bruising   EXT: moving all extremities spontaneously, b/l shoulder tenderness, left tib fib tenderness, left hip tenderness, able to move all extremities, left LE difficult moving due to left hip pain +2 dp and radial pulses  NEURO: alert and oriented x3, strength and sensation 5/5 b/l UE and LE, CN2-12 intact  PSYCH: cooperative, appropriate Vital Signs Last 24 Hrs  T(C): 37.2 (18 Oct 2022 08:55), Max: 38.4 (18 Oct 2022 06:20)  T(F): 99 (18 Oct 2022 08:55), Max: 101.2 (18 Oct 2022 06:20)  HR: 95 (18 Oct 2022 08:55) (95 - 101)  BP: 152/67 (18 Oct 2022 08:55) (146/65 - 152/67)  RR: 19 (18 Oct 2022 08:55) (19 - 20)  SpO2: 97% (18 Oct 2022 08:55) (97% - 98%)    Parameters below as of 18 Oct 2022 08:55  Patient On (Oxygen Delivery Method): nasal cannula  O2 Flow (L/min): 3    CONSTITUTIONAL: laying in bed , frail  and ill appearing  SKIN: warm, dry  HEAD: Normocephalic,  EYES: no conjunctival, left periorbital confusion   ENT: no nasal discharge, airway clear  NECK: full ROM, non-tender  CARD: regular rate and rhythm, S1 S2 systolic murmur   RESP: normal respiratory effort, no wheezes, rales or rhonchi  ABD: soft, non-distended, non-tender  back: no midline tenderness or bruising   EXT: moving all extremities spontaneously, b/l shoulder tenderness, left tib fib tenderness, left hip tenderness, able to move all extremities, left LE difficult moving due to left hip pain +2 dp and radial pulses  MSK: LEFT KNEE tenderness   NEURO: alert and oriented x3, strength and sensation 5/5 b/l UE, 4-/5 BLE , CN2-12 intact  PSYCH: cooperative, appropriate

## 2022-10-18 NOTE — ED ADULT TRIAGE NOTE - CHIEF COMPLAINT QUOTE
BIBA. Patient fell walking to the bathroom . c/o left arm pain and left leg pain. Patient on Eliquis.

## 2022-10-18 NOTE — ED PROVIDER NOTE - NS ED ROS FT
Constitutional: No fever   Eyes:  No visual changes  Ears:  No hearing changes  Neck: No neck pain  Cardiac:  No chest pain  Respiratory:  No SOB   GI:  No abdominal pain, nausea, or vomiting  :  No dysuria  MS:  No back pain +shoulder pain +knee pain +hip pain +hand pain  Neuro:  No headache or weakness.  No LOC  Skin:  No skin rash

## 2022-10-18 NOTE — H&P ADULT - HISTORY OF PRESENT ILLNESS
77-year-old female from home with past medical history atrial fibrillation on Eliquis, TIA x 3, HTN, HLD, COPD on 4 L nasal cannula, septic shock October 2021, NSTEMI, status post catheterization showing non-obstructive coronary artery disease presents after a fall.  +LOC +AC use.   Patient got up to use the bathroom with her walker and then she does not remember anything other than ending up on the floor face first. Hit her head while falling .  Her  heard the thump and found her laying face first on the ground.  Patient does not remember feeling chest pain shortness of breath headache or pain anywhere prior to falling.    She is currently having pain on her right shoulder left shoulder left knee right knee left leg left hip and left hand. Patient is having difficulty walking and is currently unable to get up.   Denies any nausea vomiting fever chills chest pain shortness of breath back pain abdominal pain urinary symptoms diarrhea numbness weakness. 77-year-old female from home with past medical history atrial fibrillation on Eliquis, TIA x 3, HTN, HLD, COPD on 4 L nasal cannula, septic shock October 2021, NSTEMI, status post catheterization showing non-obstructive coronary artery disease presents after a fall.  +LOC +AC use.   Patient got up to use the bathroom with her walker around 2 am  and then she does not remember anything other than ending up on the floor face first. Hit her head while falling .  Her   found her laying face first on the ground around 4 am .  Patient does not remember feeling chest pain shortness of breath headache or pain anywhere prior to falling.    She is currently having pain on her right shoulder left shoulder left knee right knee left leg left hip and left hand. Patient is having difficulty walking and is currently unable to get up.   +dysuria x 3 days   Denies any nausea vomiting fever chills chest pain shortness of breath back pain abdominal pain urinary symptoms diarrhea numbness weakness.    pulm : dr. flores   cards: dr. jaime     pt states no changes in medications since last admission  77-year-old female from home with past medical history P. atrial fibrillation on Eliquis, TIA x 3, HTN, HLD, COPD/Emphysema ex smoker, on 4 L nasal cannula, septic shock October 2021, CAD hx/o NSTEMI, status post catheterization showing non-obstructive coronary artery disease presents after a fall.  +LOC +AC use.   Patient got up to use the bathroom with her walker around 2 am  and then she does not remember anything other than ending up on the floor face first. Hit her head while falling .  Her   found her laying face first on the ground around 4 am .  Patient does not remember feeling chest pain shortness of breath headache or pain anywhere prior to falling.   No preceding sx prior to syncopal event, except feeling very weak, and having giveaway weakness in BLE.   She is currently having pain on her right shoulder left shoulder left knee right knee left leg left hip and left hand. Patient is having difficulty walking and is currently unable to get up.  With severe left knee pain .   +dysuria x 3 days.  Fever note din ED.   Denies any nausea vomiting fever chills chest pain shortness of breath back pain abdominal pain urinary symptoms diarrhea numbness weakness.    pulm : dr. flores   cards: dr. jaime     pt states no changes in medications since last admission

## 2022-10-19 NOTE — PROVIDER CONTACT NOTE (OTHER) - SITUATION
Oral temp 101.7. Nick Gtuierrez notified.
Pt medicated as ordered with Tylenol. Repeat temp orally is 101.8. Nick Gutierrez aware. will continue to monitor.

## 2022-10-20 NOTE — CONSULT NOTE ADULT - ASSESSMENT
ASSESSMENT: Pt is a 77 F w/ PMH COPD (on 4-5L home O2), afib (on eliquis), hx TIA, epilepsy, CAD, HTN/HLD who was admitted for syncope and fall initially, but neurology was consulted to evaluate new onset dysarthria and transient aphasia. CTH (-).       PLAN:     INCOMPLETE NOTE. PT TO BE SEEN BY ATTENDING THIS AFTERNOON AND RECOMMENDATIONS TO BE MADE. ASSESSMENT: Pt is a 77 F w/ PMH COPD (on 4-5L home O2), afib (on eliquis), hx TIA, epilepsy, CAD, HTN/HLD who was admitted for syncope and fall initially, but neurology was consulted to evaluate new onset dysarthria and transient aphasia. CTH (-).       PLAN: Case d/w Dr. Luo  # Overmedication vs TIA  - Repeat CTH in 24 hours  - Continue q8h neuro checks  - Continue ASA and eliquis  - Continue statin  - Would hold ativan tonight  -  ASSESSMENT: Pt is a 77 F w/ PMH COPD (on 4-5L home O2), afib (on eliquis), hx TIA, epilepsy, CAD, HTN/HLD who was admitted for syncope and fall initially, but neurology was consulted to evaluate new onset dysarthria and transient aphasia. CTH (-).       PLAN: Case d/w Dr. Luo  # Overmedication vs TIA  - Repeat CTH in 24 hours  - Continue q8h neuro checks  - Continue ASA and eliquis  - Continue statin  - Would hold ativan tonight  - If repeat CTH negative, no further neurological workup

## 2022-10-20 NOTE — CONSULT NOTE ADULT - NS ATTEND AMEND GEN_ALL_CORE FT
Pt admitted for mechanical fall with head trauma this AM noted to have dysarthria without any other focal deficits.  Currently much improved but with slight dysarthria per patient.  Patient believes overmedication from timing of Ativan, gabapentin and keppra QHS.  Recommend hold ativan, repeat HCT in 24 hrs and if still symptomatic, consider additional workup.

## 2022-10-20 NOTE — CHART NOTE - NSCHARTNOTEFT_GEN_A_CORE
Was alerted by nurse, pt had difficulty speaking. Pt was seen and examined at bedside A&Ox3, pt states she feels sedated, however pt had received 250mg of Seroquel and .5mg of ativan last night. Pt was seen and examined at bedside.    EYES: EOMI PERRL, +bilateral pin point pupils  Neuro: CN III- VII grossly intact, no aphasia noted however pt seems sedated.     A&P:  Speech is likely altered due to sedation   Will discontinue ativan for now.   f/u Stat CT head  Neurology consult pending Was alerted by nurse, pt had difficulty speaking. Pt was seen and examined at bedside A&Ox3, pt states she feels sedated, however pt had received 250mg of Seroquel and .5mg of ativan last night. Pt was seen and examined at bedside.    EYES: EOMI PERRL, +bilateral pin point pupils  Neuro: CN III- VII grossly intact, no aphasia noted however pt seems sedated.     A&P:  Speech is likely altered due to sedation   Will discontinue ativan for now.   Spoke to Neurologist, Dr. Luo, who suggests stroke is unlikely due to the minimal slurred speech.   f/u Stat CT head  Official Neurology consult pending  neuro checks Was alerted by nurse, pt had difficulty speaking. Pt was seen and examined at bedside A&Ox3, pt states she feels sedated, however pt had received 250mg of Seroquel and .5mg of ativan last night. Pt was seen and examined at bedside.    EYES: EOMI PERRL, +bilateral pin point pupils  Neuro: CN II- XII grossly intact, no aphasia noted however pt seems sedated.     A&P:  Speech is likely altered due to sedation   Will discontinue ativan for now.   Spoke to Neurologist, Dr. Luo, who suggests stroke is unlikely due to the minimal slurred speech.   f/u Stat CT head  Official Neurology consult pending  neuro checks  f/u ABG Was alerted by nurse, pt had difficulty speaking. Pt was seen and examined at bedside A&Ox3, pt states she feels sedated, however pt had received 250mg of Seroquel and .5mg of ativan last night. Pt was seen and examined at bedside.    EYES: EOMI PERRL, +bilateral pin point pupils  Neuro: CN II- XII grossly intact, no aphasia noted however pt seems sedated.     BP: 101/ 51, HR72, O2Sat 99% on 3L NC.     A&P:  Speech is likely altered due to sedation   Will discontinue ativan for now.   Spoke to Neurologist, Dr. Luo, who suggests stroke is unlikely due to the minimal slurred speech.   f/u Stat CT head  Official Neurology consult pending  neuro checks  f/u ABG Was alerted by nurse, pt had difficulty speaking. Pt was seen and examined at bedside A&Ox3, pt states she feels sedated, however pt had received 250mg of Seroquel and .5mg of ativan last night. Pt was seen and examined at bedside. Her symptoms started approximately 5:45AM.     EYES: EOMI PERRL, +bilateral pin point pupils  Neuro: CN II- XII grossly intact, no aphasia noted however pt seems sedated.     BP: 101/ 51, HR72, O2Sat 99% on 3L NC.     A&P:  Speech is likely altered due to sedation   Will discontinue ativan for now.   Spoke to Neurologist, Dr. Luo, who suggests stroke is unlikely due to the minimal slurred speech.   f/u Stat CT head  Official Neurology consult pending  neuro checks  f/u ABG Was alerted by nurse, pt had difficulty speaking. Pt was seen and examined at bedside A&Ox3, pt states she feels sedated, however pt had received 250mg of Seroquel and .5mg of ativan last night. Pt was seen and examined at bedside. Her symptoms started approximately 5:45AM. Pt states she has home oxygen and uses more or less 3L NC at home, she has FLAVIO but she has not used her NIV for a long time.     CVS: S1&S2 present, regular rate and rhythm, no MRG  Lungs: bilateral air entry present, no wheezing or rhonchi.   EYES: EOMI PERRL, +bilateral pin point pupils  Neuro: 5/5 b/l UE, 4-/5 BLE, CN II- XII grossly intact, no aphasia noted however pt seems sedated.     BP: 101/ 51, HR72, O2Sat 99% on 3L NC.     A&P:  Speech is likely altered due to sedation  Pt is non compliant with NIV at home, f/u ABG to r/o CO2 narcosis.  Will discontinue ativan for now.   Spoke to Neurologist, Dr. Luo, who suggests stroke is unlikely due to the minimal slurred speech.   f/u Stat CT head  As per on call radiologist, no acute changes were noted on the CT head, f/u official read.   Official Neurology consult pending  neuro checks

## 2022-10-20 NOTE — PROGRESS NOTE ADULT - SUBJECTIVE AND OBJECTIVE BOX
SHAUN COATES  77y  Female      Patient is a 77y old  Female who presents with a chief complaint of fall at home (18 Oct 2022 10:37)      INTERVAL HPI/OVERNIGHT EVENTS:  Patient seen and examined earlier this morning; patient denies any new complaints. She reports her she had difficulty speaking overnight which has improved. Otherwise she denies CP, palpitations, worsening SOB, F/C.      REVIEW OF SYSTEMS:  CONSTITUTIONAL: No fever, weight loss, or fatigue  EYES: No eye pain, visual disturbances, or discharge  ENMT:  No difficulty hearing, tinnitus, vertigo  NECK: No pain or stiffness  RESPIRATORY: No cough, wheezing, chills or hemoptysis; No shortness of breath  CARDIOVASCULAR: No chest pain, palpitations, dizziness, or leg swelling  GASTROINTESTINAL: No abdominal or epigastric pain. No nausea, vomiting, or hematemesis; No diarrhea or constipation.   GENITOURINARY: No dysuria, frequency, hematuria, or incontinence  NEUROLOGICAL: No headaches, memory loss, loss of strength, numbness, or tremors  MUSCULOSKELETAL: No joint pain or swelling; No muscle, back, +extremity pain      Vital Signs Last 24 Hrs  T(C): 37.6 (20 Oct 2022 14:37), Max: 38.8 (19 Oct 2022 16:46)  T(F): 99.7 (20 Oct 2022 14:37), Max: 101.8 (19 Oct 2022 16:46)  HR: 69 (20 Oct 2022 14:37) (69 - 83)  BP: 90/50 (20 Oct 2022 14:37) (90/50 - 131/62)  BP(mean): 89 (20 Oct 2022 07:01) (70 - 89)  RR: 16 (20 Oct 2022 14:37) (16 - 20)  SpO2: 98% (20 Oct 2022 07:01) (93% - 98%)    Parameters below as of 20 Oct 2022 07:01  Patient On (Oxygen Delivery Method): nasal cannula  O2 Flow (L/min): 5    PHYSICAL EXAM:  GENERAL: NAD, well-groomed, well-developed  HEAD:  Atraumatic, Normocephalic  EYES: EOMI, PERRLA, conjunctiva and sclera clear  ENMT: No tonsillar erythema, exudates, or enlargement  NECK: Supple, No JVD, Normal thyroid  NERVOUS SYSTEM:  Alert & Oriented X3, Good concentration; Motor Strength 5/5 B/L upper and lower extremities  CHEST/LUNG: Clear to percussion bilaterally; No rales, rhonchi, wheezing, or rubs  HEART: Regular rate and rhythm; No murmurs, rubs, or gallops  ABDOMEN: Soft, Nontender, Nondistended; Bowel sounds present  EXTREMITIES: + left knee tenderness    Consultant(s) Notes Reviewed:  [x ] YES  [ ] NO  Care Discussed with Consultants/Other Providers [ x] YES  [ ] NO    LAB:                        9.7    5.48  )-----------( 245      ( 20 Oct 2022 07:10 )             31.3   10    138  |  103  |  23<H>  ----------------------------<  100<H>  3.7   |  25  |  1.3    Ca    8.3<L>      20 Oct 2022 07:10    TPro  6.7  /  Alb  3.5  /  TBili  0.2  /  DBili  x   /  AST  49<H>  /  ALT  23  /  AlkPhos  420<H>  10-20        Drug Dosing Weight  Height (cm): 157.5 (18 Oct 2022 06:20)  Weight (kg): 81.6 (18 Oct 2022 06:20)  BMI (kg/m2): 32.9 (18 Oct 2022 06:20)  BSA (m2): 1.83 (18 Oct 2022 06:20)    CAPILLARY BLOOD GLUCOSE        I&O's Summary    Urinalysis Basic - ( 18 Oct 2022 07:17 )    Color: Yellow / Appearance: Slightly Cloudy / S.015 / pH: x  Gluc: x / Ketone: Negative  / Bili: Negative / Urobili: 0.2 mg/dL   Blood: x / Protein: 30 mg/dL / Nitrite: Positive   Leuk Esterase: Moderate / RBC: 3-5 /HPF / WBC 10-25 /HPF   Sq Epi: x / Non Sq Epi: Many /HPF / Bacteria: Many        RADIOLOGY & ADDITIONAL TESTS:  Imaging Personally Reviewed:  [x] YES  [ ] NO    HEALTH ISSUES - PROBLEM Dx:        MEDICATIONS  (STANDING):  apixaban 5 milliGRAM(s) Oral every 12 hours  aspirin  chewable 81 milliGRAM(s) Oral daily  atorvastatin 20 milliGRAM(s) Oral at bedtime  budesonide 160 MICROgram(s)/formoterol 4.5 MICROgram(s) Inhaler 2 Puff(s) Inhalation two times a day  cefTRIAXone   IVPB 1000 milliGRAM(s) IV Intermittent every 24 hours  chlorhexidine 2% Cloths 1 Application(s) Topical <User Schedule>  gabapentin 400 milliGRAM(s) Oral two times a day  lamoTRIgine 100 milliGRAM(s) Oral two times a day  metoprolol tartrate 75 milliGRAM(s) Oral two times a day  montelukast 10 milliGRAM(s) Oral daily  pantoprazole    Tablet 40 milliGRAM(s) Oral before breakfast  phenazopyridine 200 milliGRAM(s) Oral every 8 hours  QUEtiapine 200 milliGRAM(s) Oral at bedtime  QUEtiapine 50 milliGRAM(s) Oral at bedtime  sodium chloride 0.9%. 1000 milliLiter(s) (75 mL/Hr) IV Continuous <Continuous>    MEDICATIONS  (PRN):  acetaminophen     Tablet .. 650 milliGRAM(s) Oral every 6 hours PRN Temp greater or equal to 38C (100.4F)  acetaminophen     Tablet .. 650 milliGRAM(s) Oral every 6 hours PRN Mild Pain (1 - 3)  ALBUTerol    90 MICROgram(s) HFA Inhaler 2 Puff(s) Inhalation every 8 hours PRN Bronchospasm  albuterol/ipratropium for Nebulization 3 milliLiter(s) Nebulizer every 6 hours PRN Bronchospasm  ondansetron Injectable 4 milliGRAM(s) IV Push every 8 hours PRN Nausea and/or Vomiting  polyethylene glycol 3350 17 Gram(s) Oral daily PRN Constipation  
  SHAUN COATES  77y  Female      Patient is a 77y old  Female who presents with a chief complaint of fall at home (18 Oct 2022 10:37)      INTERVAL HPI/OVERNIGHT EVENTS:  Patient seen and examined earlier this morning; patient denies any new complaints. Reports she was walking and legs felt very heavy as if it was stuck to the ground and then she syncopized She reports she has had 3 UTIs since May. Otherwise she denies CP, palpitations, worsening SOB, F/C.      REVIEW OF SYSTEMS:  CONSTITUTIONAL: No fever, weight loss, or fatigue  EYES: No eye pain, visual disturbances, or discharge  ENMT:  No difficulty hearing, tinnitus, vertigo  NECK: No pain or stiffness  RESPIRATORY: No cough, wheezing, chills or hemoptysis; No shortness of breath  CARDIOVASCULAR: No chest pain, palpitations, dizziness, or leg swelling  GASTROINTESTINAL: No abdominal or epigastric pain. No nausea, vomiting, or hematemesis; No diarrhea or constipation.   GENITOURINARY: No dysuria, frequency, hematuria, or incontinence  NEUROLOGICAL: No headaches, memory loss, loss of strength, numbness, or tremors  MUSCULOSKELETAL: No joint pain or swelling; No muscle, back, +extremity pain      T(C): 35.7 (10-19-22 @ 05:00), Max: 38.6 (10-18-22 @ 20:39)  HR: 98 (10-18-22 @ 20:39) (75 - 98)  BP: 131/79 (10-18-22 @ 20:39) (131/79 - 185/80)  RR: 18 (10-19-22 @ 05:00) (18 - 18)  SpO2: 97% (10-19-22 @ 05:00) (95% - 98%)    PHYSICAL EXAM:  GENERAL: NAD, well-groomed, well-developed  HEAD:  Atraumatic, Normocephalic  EYES: EOMI, PERRLA, conjunctiva and sclera clear  ENMT: No tonsillar erythema, exudates, or enlargement  NECK: Supple, No JVD, Normal thyroid  NERVOUS SYSTEM:  Alert & Oriented X3, Good concentration; Motor Strength 5/5 B/L upper and lower extremities  CHEST/LUNG: Clear to percussion bilaterally; No rales, rhonchi, wheezing, or rubs  HEART: Regular rate and rhythm; No murmurs, rubs, or gallops  ABDOMEN: Soft, Nontender, Nondistended; Bowel sounds present  EXTREMITIES: + left knee tenderness    Consultant(s) Notes Reviewed:  [x ] YES  [ ] NO  Care Discussed with Consultants/Other Providers [ x] YES  [ ] NO    LAB:                        11.3   7.37  )-----------( 250      ( 19 Oct 2022 07:25 )             35.7     10-    139  |  102  |  19  ----------------------------<  94  4.2   |  26  |  1.1    Ca    9.5      19 Oct 2022 07:25    TPro  7.2  /  Alb  3.8  /  TBili  0.4  /  DBili  x   /  AST  57<H>  /  ALT  26  /  AlkPhos  498<H>  10    LIVER FUNCTIONS - ( 19 Oct 2022 07:25 )  Alb: 3.8 g/dL / Pro: 7.2 g/dL / ALK PHOS: 498 U/L / ALT: 26 U/L / AST: 57 U/L / GGT: x           CARDIAC MARKERS ( 18 Oct 2022 22:30 )  x     / <0.01 ng/mL / x     / x     / x      CARDIAC MARKERS ( 18 Oct 2022 15:40 )  x     / <0.01 ng/mL / x     / x     / x      CARDIAC MARKERS ( 18 Oct 2022 07:17 )  x     / <0.01 ng/mL / 68 U/L / x     / x                  Drug Dosing Weight  Height (cm): 157.5 (18 Oct 2022 06:20)  Weight (kg): 81.6 (18 Oct 2022 06:20)  BMI (kg/m2): 32.9 (18 Oct 2022 06:20)  BSA (m2): 1.83 (18 Oct 2022 06:20)    CAPILLARY BLOOD GLUCOSE        I&O's Summary    Urinalysis Basic - ( 18 Oct 2022 07:17 )    Color: Yellow / Appearance: Slightly Cloudy / S.015 / pH: x  Gluc: x / Ketone: Negative  / Bili: Negative / Urobili: 0.2 mg/dL   Blood: x / Protein: 30 mg/dL / Nitrite: Positive   Leuk Esterase: Moderate / RBC: 3-5 /HPF / WBC 10-25 /HPF   Sq Epi: x / Non Sq Epi: Many /HPF / Bacteria: Many        RADIOLOGY & ADDITIONAL TESTS:  Imaging Personally Reviewed:  [x] YES  [ ] NO    HEALTH ISSUES - PROBLEM Dx:          MEDS:  acetaminophen     Tablet .. 650 milliGRAM(s) Oral every 6 hours PRN  ALBUTerol    90 MICROgram(s) HFA Inhaler 2 Puff(s) Inhalation every 8 hours PRN  albuterol/ipratropium for Nebulization 3 milliLiter(s) Nebulizer every 6 hours PRN  apixaban 5 milliGRAM(s) Oral every 12 hours  aspirin  chewable 81 milliGRAM(s) Oral daily  atorvastatin 20 milliGRAM(s) Oral at bedtime  budesonide 160 MICROgram(s)/formoterol 4.5 MICROgram(s) Inhaler 2 Puff(s) Inhalation two times a day  cefTRIAXone   IVPB 1000 milliGRAM(s) IV Intermittent every 24 hours  chlorhexidine 2% Cloths 1 Application(s) Topical <User Schedule>  gabapentin 400 milliGRAM(s) Oral two times a day  lamoTRIgine 100 milliGRAM(s) Oral two times a day  LORazepam     Tablet 0.5 milliGRAM(s) Oral two times a day PRN  metoprolol tartrate 75 milliGRAM(s) Oral two times a day  montelukast 10 milliGRAM(s) Oral daily  ondansetron Injectable 4 milliGRAM(s) IV Push every 8 hours PRN  pantoprazole    Tablet 40 milliGRAM(s) Oral before breakfast  polyethylene glycol 3350 17 Gram(s) Oral daily PRN  QUEtiapine 200 milliGRAM(s) Oral at bedtime  QUEtiapine 50 milliGRAM(s) Oral at bedtime  sodium chloride 0.9%. 1000 milliLiter(s) IV Continuous <Continuous>

## 2022-10-20 NOTE — PROGRESS NOTE ADULT - ASSESSMENT
77-year-old female from home with past medical history atrial fibrillation on Eliquis, TIA x 3, HTN, HLD, COPD on 4 L nasal cannula, FLAVIO, septic shock October 2021, NSTEMI, status post catheterization showing non-obstructive coronary artery disease presents after a fall.  +LOC +AC use.     #Syncope and collapse likely vasovagal, ruled out cardiac etiology    #UTI  #Dehydration  - UA positive/ pending urine cx ( known to have uti in past )  - trop negative X2  - orthostatic vitals wnl  - EEG wnl  - ECHO: Left ventricular ejection fraction, by visual estimation, is 55 to 60%. Elevated mean left atrial pressure. Moderate left ventricular hypertrophy. Spectral Doppler shows restrictive pattern of left ventricular myocardial filling (Grade III diastolic dysfunction). Mildly enlarged left atrium. Mild mitral annular calcification. There is mild aortic root calcification.  PLAN  - start CTX x 3 days   - fall / sz precaution   - PT eval   - neuro checks q8   - tele monitoring  - IVf 75cc /hr x 8 hours     #new onset dysarthria and transient aphasia related to sedation  -improvement in symptoms  -CT head: No acute intracranial pathology. Stable mild chronic microvascular ischemic changes.  PLAN  - f/u neuro eval  - holding ativan    #Fall, left knee pain likely contusoin  - pan CT scans -ve for facture  - XR R knee shoulder -VE   - Xray left knee no fx  PLAN  - pain control   - PT eval    #Left eye contusion  - no vision loss  - monitor     #Elevated ALP   - can be from fall , pt has no abd pain   - likely dehydration, iv fluids, monitor   - repeat in am      #HTN  - c/w BB  - low sodium diet   - monitor vitals     #Hx//o of multiple TIA  #h/o of chronic persistent afib  #HLD  - TTE (10/21/21): EF 55-60%, mod pHTN, no valvular or wall motion abn  - c/w metoprolol and Eliquis and statin    #COPD with chronic respiratory failure   #FLAVIO  - CT chest reviewed   - continue Albuterol MDI, Singular  - c/w cpap as needed  - O2 nc 4L ( home dose )  - duoneb q6 prn  - symbicort will inpt , ( pt on breo Elipita )     #Depression/ anxiety   - continue Seroquel qhs/ ativan prn/ apple  - lamictal     #OA  - tylenol prn pain 1-3    DVT px/GI px  - eliquis/PPI po     Full code     Progress Note Handoff  Pending Consults: PT  Pending Tests: none  Pending Results: clinical improvement  Family Discussion: Patient  Disposition: Home___Xlikely dc in 24hrs__/SNF______/Other_____/Unknown at this time_____  Spent over 35 min reviewing chart and on coordinating patient care during interdisciplinary rounds 
77-year-old female from home with past medical history atrial fibrillation on Eliquis, TIA x 3, HTN, HLD, COPD on 4 L nasal cannula, FLAVIO, septic shock October 2021, NSTEMI, status post catheterization showing non-obstructive coronary artery disease presents after a fall.  +LOC +AC use.     #Syncope and collapse likely vasovagal, ruled out cardiac etiology    #UTI  #Dehydration  - UA positive/ pending urine cx ( known to have uti in past )  - trop negative X2  - orthostatic vitals wnl  PLAN  - start CTX x 3 days   - fall / sz precaution   - PT eval   - obtain echo   - routine EEG  - neuro checks q8   - tele monitoring  - IVf 75cc /hr x 8 hours     #Fall, left knee pain likely contusoin  - pan CT scans -ve for facture  - XR R knee shoulder -VE   PLAN  - f/u XR of left knee  - pain control   - PT eval    #Left eye contusion  - no vision loss  - monitor     #Elevated ALP   - can be from fall , pt has no abd pain   - likely dehydration, iv fluids, monitor   - repeat in am      #HTN  - c/w BB  - low sodium diet   - monitor vitals     #Hx//o of multiple TIA  #h/o of chronic persistent afib  #HLD  - TTE (10/21/21): EF 55-60%, mod pHTN, no valvular or wall motion abn  - c/w metoprolol and Eliquis and statin    #COPD with chronic respiratory failure   #FLAVIO  - CT chest reviewed   - continue Albuterol MDI, Singular,   - c/w cpap as needed  - O2 nc 4L ( home dose )  - duoneb q6 prn  - symbicort will inpt , ( pt on breo Elipita )   - CT chest reviewed     #Depression/ anxiety   - continue Seroquel qhs/ ativan prn/ apple  - lamictal     #OA  - tylenol prn pain 1-3    DVT px/GI px  - eliquis/PPI po     Full code     Progress Note Handoff  Pending Consults: PT  Pending Tests: ECHO, EEG read  Pending Results: clinical improvement  Family Discussion: Patient  Disposition: Home___Xlikely dc in 24hrs__/SNF______/Other_____/Unknown at this time_____  Spent over 35 min reviewing chart and on coordinating patient care during interdisciplinary rounds

## 2022-10-20 NOTE — CONSULT NOTE ADULT - SUBJECTIVE AND OBJECTIVE BOX
Neurology Consult  Pt is a 77 F w/ PMH COPD (on 4-5L home O2), afib (on eliquis), hx TIA, epilepsy, CAD, HTN/HLD who was admitted for syncope and fall initially, but neurology was consulted to evaluate new onset dysarthria. Pt reports that she had acute onset of dysarthria this AM, which has improved but is still present. She also reports transient aphasia which has resolved now. Provider overnight (as well as pt) suspected overmedication, as pt reports she typically spaces out her antiepileptic medications and lorazepam from her gabapentin, which has not been the case while she has been admitted. Pt also has some proximal LLE weakness, but she reports this is 2/2 severe L knee pain since her fall. CTH (-). Denies HA, dizziness, fever/chills, cough, rhinorrhea, vision changes, SOB, chest pain, NVD, abdominal pain, change in urination and change in BM.       HPI:  77-year-old female from home with past medical history P. atrial fibrillation on Eliquis, TIA x 3, HTN, HLD, COPD/Emphysema ex smoker, on 4 L nasal cannula, septic shock October 2021, CAD hx/o NSTEMI, status post catheterization showing non-obstructive coronary artery disease presents after a fall.  +LOC +AC use.   Patient got up to use the bathroom with her walker around 2 am  and then she does not remember anything other than ending up on the floor face first. Hit her head while falling .  Her   found her laying face first on the ground around 4 am .  Patient does not remember feeling chest pain shortness of breath headache or pain anywhere prior to falling.   No preceding sx prior to syncopal event, except feeling very weak, and having giveaway weakness in BLE.   She is currently having pain on her right shoulder left shoulder left knee right knee left leg left hip and left hand. Patient is having difficulty walking and is currently unable to get up.  With severe left knee pain .   +dysuria x 3 days.  Fever note din ED.   Denies any nausea vomiting fever chills chest pain shortness of breath back pain abdominal pain urinary symptoms diarrhea numbness weakness.    pulm : dr. flores   cards: dr. jaime     pt states no changes in medications since last admission  (18 Oct 2022 10:37)      PAST MEDICAL & SURGICAL HISTORY:  Hypertension  Depression  COPD (chronic obstructive pulmonary disease)  Other cardiomyopathy  Other emphysema  PVD (peripheral vascular disease)  Bipolar 1 disorder  FLAVIO on CPAP  Transient ischemic attack - x 3  Congestive heart failure  Falls  2019 novel coronavirus disease (COVID-19)  Respiratory failure requiring intubation  Afib  HLD (hyperlipidemia)  History of cholecystectomy  H/O carotid angioplasty      FAMILY HISTORY:  FH: heart disease (Father, Mother)    Social History: (-) x 3    Allergies    codeine (Other (Moderate))  Depakote (Unknown)  Dilaudid (Short breath; Rash)  IV Contrast (Anaphylaxis)  losartan (Angioedema)  Risperdal (Other)  verapamil (Short breath; Angioedema)        MEDICATIONS  (STANDING):  apixaban 5 milliGRAM(s) Oral every 12 hours  aspirin  chewable 81 milliGRAM(s) Oral daily  atorvastatin 20 milliGRAM(s) Oral at bedtime  budesonide 160 MICROgram(s)/formoterol 4.5 MICROgram(s) Inhaler 2 Puff(s) Inhalation two times a day  cefTRIAXone   IVPB 1000 milliGRAM(s) IV Intermittent every 24 hours  chlorhexidine 2% Cloths 1 Application(s) Topical <User Schedule>  gabapentin 400 milliGRAM(s) Oral two times a day  lamoTRIgine 100 milliGRAM(s) Oral two times a day  metoprolol tartrate 75 milliGRAM(s) Oral two times a day  montelukast 10 milliGRAM(s) Oral daily  pantoprazole    Tablet 40 milliGRAM(s) Oral before breakfast  phenazopyridine 200 milliGRAM(s) Oral every 8 hours  QUEtiapine 200 milliGRAM(s) Oral at bedtime  QUEtiapine 50 milliGRAM(s) Oral at bedtime  sodium chloride 0.9%. 1000 milliLiter(s) (75 mL/Hr) IV Continuous <Continuous>    MEDICATIONS  (PRN):  acetaminophen     Tablet .. 650 milliGRAM(s) Oral every 6 hours PRN Temp greater or equal to 38C (100.4F)  acetaminophen     Tablet .. 650 milliGRAM(s) Oral every 6 hours PRN Mild Pain (1 - 3)  ALBUTerol    90 MICROgram(s) HFA Inhaler 2 Puff(s) Inhalation every 8 hours PRN Bronchospasm  albuterol/ipratropium for Nebulization 3 milliLiter(s) Nebulizer every 6 hours PRN Bronchospasm  ondansetron Injectable 4 milliGRAM(s) IV Push every 8 hours PRN Nausea and/or Vomiting  polyethylene glycol 3350 17 Gram(s) Oral daily PRN Constipation        Vital Signs Last 24 Hrs  T(C): 36.8 (20 Oct 2022 05:10), Max: 38.8 (19 Oct 2022 16:46)  T(F): 98.3 (20 Oct 2022 05:10), Max: 101.8 (19 Oct 2022 16:46)  HR: 74 (20 Oct 2022 07:01) (72 - 83)  BP: 131/62 (20 Oct 2022 07:01) (101/51 - 131/62)  BP(mean): 89 (20 Oct 2022 07:01) (70 - 89)  RR: 20 (20 Oct 2022 07:01) (16 - 20)  SpO2: 98% (20 Oct 2022 07:01) (93% - 98%)    Parameters below as of 20 Oct 2022 07:01  Patient On (Oxygen Delivery Method): nasal cannula  O2 Flow (L/min): 5      Examination:  General:  Appearance is consistent with chronologic age.  No abnormal facies.  Gross skin survey within normal limits.    Cognitive/Language:  The patient is oriented to person, place, time and date.  Recent and remote memory intact.  Fund of knowledge is intact and normal.  Language with normal repetition, comprehension and naming.  +dysarthria   Eyes: intact VA, VFF.  EOMI w/o nystagmus, skew or reported double vision.  PERRL.  No ptosis/weakness of eyelid closure.    Face:  Facial sensation normal V1 - 3, no facial asymmetry.    Ears/Nose/Throat:  Hearing grossly intact b/l.  Palate elevates midline.  Tongue and uvula midline.   Motor examination:   Normal tone, bulk and range of motion.  No tenderness, twitching, tremors or involuntary movements.  Formal Muscle Strength Testing: (MRC grade R/L) 5/5 UE; 5/5 RLE, 4/5 LLE.  No observable drift.  Sensory examination:   Intact to light touch and pinprick in all extremities.  Cerebellum:  No dysmetria or dysdiadokinesia.      Labs:                         9.7    5.48  )-----------( 245      ( 20 Oct 2022 07:10 )             31.3       10-20    138  |  103  |  23<H>  ----------------------------<  100<H>  3.7   |  25  |  1.3    Ca    8.3<L>      20 Oct 2022 07:10    TPro  6.7  /  Alb  3.5  /  TBili  0.2  /  DBili  x   /  AST  49<H>  /  ALT  23  /  AlkPhos  420<H>  10-20    LIVER FUNCTIONS - ( 20 Oct 2022 07:10 )  Alb: 3.5 g/dL / Pro: 6.7 g/dL / ALK PHOS: 420 U/L / ALT: 23 U/L / AST: 49 U/L / GGT: x           < from: CT Head No Cont (10.20.22 @ 06:31) >  IMPRESSION:    No acute intracranial pathology.    Stable mild chronic microvascular ischemic changes.    --- End of Report ---    < end of copied text >

## 2022-10-21 NOTE — DISCHARGE NOTE PROVIDER - NSDCCPCAREPLAN_GEN_ALL_CORE_FT
PRINCIPAL DISCHARGE DIAGNOSIS  Diagnosis: Falls  Assessment and Plan of Treatment: Patient was admitted for syncope and collapse likely vasovagal. She had sustained left eye contusion and b/l knee contusion (L>R); her pan-scan were negative for fracture. She was admitted to telemetry to ruled out cardiac etiology; Cardiac and neurological etiology were ruled out. Her troponins were negative X2; orthostatic vitals were unremarkable. ECHO: Left ventricular ejection fraction, by visual estimation, is 55 to 60%. Elevated mean left atrial pressure. Moderate left ventricular hypertrophy. Spectral Doppler shows restrictive pattern of left ventricular myocardial filling (Grade III diastolic dysfunction). Mildly enlarged left atrium. Mild mitral annular calcification. There is mild aortic root calcification. Her EEG was negative; she was also evaluated by neurologist for dysarthria and transient aphasia related to sedation; stroke was ruled out. Repeat CT head was negative. Her symptoms improved. She was treated for UTI with IV antibiotics. She was evaluated by PT. At this time she is medically stable for a hospital discharge with outpatient PT follow up.  Things to follow up:  -PCP follow up in 1-2 weeks  -Follow up with Urologist in 1-2 weeks  -Take pyridium for 2 days as instructed  -Take vantin for 2 days as instructed  -Take medications as instructed      SECONDARY DISCHARGE DIAGNOSES  Diagnosis: Poor tolerance for ambulation  Assessment and Plan of Treatment:

## 2022-10-21 NOTE — DISCHARGE NOTE PROVIDER - NSDCMRMEDTOKEN_GEN_ALL_CORE_FT
albuterol 90 mcg/inh inhalation aerosol with adapter: 2 puff(s) inhaled every 4 hours, As Needed  aspirin 81 mg oral tablet:   atorvastatin 20 mg oral tablet: 1 tab(s) orally once a day  Breo Ellipta 200 mcg-25 mcg/inh inhalation powder: puff(s) inhaled once a day  cefpodoxime 200 mg oral tablet: 1 tab(s) orally 2 times a day   Eliquis 5 mg oral tablet: 1 tab(s) orally 2 times a day  freetext medication     -: 1 puff(s) inhaled once a day  gabapentin 400 mg oral capsule: 1 cap(s) orally 2 times a day  Incruse Ellipta 62.5 mcg/inh inhalation powder: 1 puff(s) inhaled every 24 hours  lamoTRIgine 100 mg oral tablet: 1 tab(s) orally 2 times a day  lidocaine 4% topical film: Apply topically to affected area once a day  LORazepam 0.5 mg oral tablet: 1  orally 2 times a day, As Needed for agitation  metoprolol tartrate 75 mg oral tablet: 1 tab(s) orally 2 times a day  montelukast 10 mg oral tablet: 1 tab(s) orally once a day  pantoprazole 40 mg oral delayed release tablet: 1 tab(s) orally once a day  phenazopyridine 200 mg oral tablet: 1 tab(s) orally every 8 hours  polyethylene glycol 3350 oral powder for reconstitution: 17 gram(s) orally once a day  QUEtiapine 200 mg oral tablet: 250 milligram(s) orally once a day (at bedtime)  Xyzal: 1 tab(s) orally once a day

## 2022-10-21 NOTE — DISCHARGE NOTE NURSING/CASE MANAGEMENT/SOCIAL WORK - NSDCPEFALRISK_GEN_ALL_CORE
For information on Fall & Injury Prevention, visit: [email or web address] OR  [email or web address] OR  https://www.cdc.gov/steadi/patient.html Repair Type: Complex Repair

## 2022-10-21 NOTE — DISCHARGE NOTE PROVIDER - CARE PROVIDER_API CALL
Milad Aguilar)  Family Medicine  [address] [place] [address]  Phone: [phone]  Fax: [phone]  Follow Up Time: 2 weeks    Lashae Sow)  Urology  [address] [place] [address], [address]  [place] [address]  Phone: [phone]  Fax: [phone]  Follow Up Time: 2 weeks

## 2022-10-21 NOTE — DISCHARGE NOTE PROVIDER - NSDCFUSCHEDAPPT_GEN_ALL_CORE_FT
Jacob Vickers  [facility]  PULMED [address]  Scheduled Appointment: 11/01/2022    Carl Bishop  [facility]  CARDIOLOGY [address]  Scheduled Appointment: 11/29/2022

## 2022-10-21 NOTE — DISCHARGE NOTE PROVIDER - HOSPITAL COURSE
77-year-old female from home with past medical history atrial fibrillation on Eliquis, TIA x 3, HTN, HLD, COPD on 4 L nasal cannula, FLAVIO, septic shock October 2021, NSTEMI, status post catheterization showing non-obstructive coronary artery disease presents after a fall.  +LOC +AC use. Patient was admitted for syncope and collapse likely vasovagal. She had sustained left eye contusion and b/l knee contusion (L>R); her pan-scan were negative for fracture. She was admitted to telemetry to ruled out cardiac etiology; Cardiac and neurological etiology were ruled out. Her troponins were negative X2; orthostatic vitals were unremarkable. ECHO: Left ventricular ejection fraction, by visual estimation, is 55 to 60%. Elevated mean left atrial pressure. Moderate left ventricular hypertrophy. Spectral Doppler shows restrictive pattern of left ventricular myocardial filling (Grade III diastolic dysfunction). Mildly enlarged left atrium. Mild mitral annular calcification. There is mild aortic root calcification. Her EEG was negative; she was also evaluated by neurologist for dysarthria and transient aphasia related to sedation; stroke was ruled out. Repeat CT head was negative. Her symptoms improved. She was treated for UTI with IV antibiotics. She was evaluated by PT. At this time she is medically stable for a hospital discharge with outpatient PT follow up.    Things to follow up:  -PCP follow up in 1-2 weeks  -Follow up with Urologist in 1-2 weeks  -Take pyridium for 2 days as instructed  -Take vantin for 2 days as instructed  -Take medications as instructed       Patient was seen and examined at bedside; patient reports improvement in her symptoms; she reports she will f/u with her urologist. Discharge planning was discussed.  Vital Signs Last 24 Hrs  T(C): 37.5 (20 Oct 2022 21:04), Max: 37.6 (20 Oct 2022 14:37)  T(F): 99.5 (20 Oct 2022 21:04), Max: 99.7 (20 Oct 2022 14:37)  HR: 55 (20 Oct 2022 21:04) (55 - 78)  BP: 139/81 (21 Oct 2022 06:10) (90/50 - 139/81)  BP(mean): 77 (21 Oct 2022 06:10) (77 - 77)  RR: 18 (21 Oct 2022 06:10) (16 - 18)  SpO2: 97% (21 Oct 2022 06:10) (97% - 97%)    Parameters below as of 21 Oct 2022 06:10  Patient On (Oxygen Delivery Method): nasal cannula    GENERAL: NAD, well-groomed, well-developed  HEAD:  Atraumatic, Normocephalic  EYES: EOMI, PERRLA, conjunctiva and sclera clear  ENMT: No tonsillar erythema, exudates, or enlargement  NECK: Supple, No JVD, Normal thyroid  NERVOUS SYSTEM:  Alert & Oriented X3, Good concentration; Motor Strength 5/5 B/L upper and lower extremities  CHEST/LUNG: Clear to percussion bilaterally; No rales, rhonchi, wheezing, or rubs  HEART: Regular rate and rhythm; No murmurs, rubs, or gallops  ABDOMEN: Soft, Nontender, Nondistended; Bowel sounds present  EXTREMITIES: + left knee tenderness    
Detail Level: Zone
Detail Level: Simple

## 2022-10-21 NOTE — DISCHARGE NOTE PROVIDER - NSDCHHHOMEBOUNDOTHER_GEN_ALL_CORE_FT
Hpi Title: Evaluation of Skin Lesions
How Severe Are Your Spot(S)?: mild
Have Your Spot(S) Been Treated In The Past?: has not been treated
debility, ADLs

## 2022-10-21 NOTE — DISCHARGE NOTE PROVIDER - PROVIDER TOKENS
PROVIDER:[TOKEN:[[id number]],FOLLOWUP:[2 weeks]],PROVIDER:[TOKEN:[[id number]],FOLLOWUP:[2 weeks]]

## 2022-10-21 NOTE — DISCHARGE NOTE NURSING/CASE MANAGEMENT/SOCIAL WORK - PATIENT PORTAL LINK FT
You can access the FollowMyHealth Patient Portal offered by [facility] by registering at the following website: [email or web address]. By joining [employer]’s FollowMyHealth portal, you will also be able to view your health information using other applications (apps) compatible with our system.

## 2022-10-21 NOTE — DISCHARGE NOTE NURSING/CASE MANAGEMENT/SOCIAL WORK - NSDCVIVACCINE_GEN_ALL_CORE_FT
influenza, injectable, quadrivalent, preservative free; 03-Oct-2019 11:19; Mona Martinez (RN); [employer]; [id number] (Exp. Date: 30-Jun-2020); IntraMuscular; Deltoid Left.; 0.5 milliLiter(s); VIS (VIS Published: 15-Aug-2019, VIS Presented: 03-Oct-2019);   Tdap; 13-Oct-2018 09:00; Shanell Davis (RN); [employer]; [id number] (Exp. Date: 22-Aug-2020); IntraMuscular; Deltoid Left.; 0.5 milliLiter(s); VIS (VIS Published: 09-May-2013, VIS Presented: 13-Oct-2018);

## 2022-10-21 NOTE — DISCHARGE NOTE PROVIDER - NSDCFUADDINST_GEN_ALL_CORE_FT
Things to follow up:  -PCP follow up in 1-2 weeks  -Follow up with Urologist in 1-2 weeks  -Take pyridium for 2 days as instructed  -Take vantin for 2 days as instructed  -Take medications as instructed

## 2022-10-21 NOTE — DISCHARGE NOTE PROVIDER - CARE PROVIDERS DIRECT ADDRESSES
,[email or web address]

## 2022-10-29 NOTE — PHYSICAL THERAPY INITIAL EVALUATION ADULT - HEALTH SCREEN CRITERIA
Emergency Department Provider Note                   PCP:                Ammy Siddiqui RN               Age: 73 y.o. Sex: female       ICD-10-CM    1. Metabolic encephalopathy  O28.66       2. Pneumonia of left lower lobe due to infectious organism  J18.9       3. JOSE (acute kidney injury) ([facility] [address]  N17.9       4. Leukocytosis, unspecified type  D72.829           DISPOSITION Admitted 10/29/2022 02:40:17 AM        MDM  Number of Diagnoses or Management Options  JOSE (acute kidney injury) ([facility] [address]  Leukocytosis, unspecified type  Metabolic encephalopathy  Pneumonia of left lower lobe due to infectious organism  Diagnosis management comments: 45-year-old female presents emerged department for altered mental status. Altered mental status work up was initiated here. CBC shows 16,000 white count, hemoglobin 10.8, platelets 859  CMP shows a slight acute kidney injury with creatinine of 2.0 glucose of 156  Chest x-ray code concerns for left-sided pneumonia. At this point I think the patient should be admitted for metabolic encephalopathy, acute kidney injury and pneumonia. Patient was given IV vancomycin and Zosyn. I spoke with the on-call hospitalist Dr. Mariano Oro had come down and seen and evaluated the patient was agreeable for admission. Orders Placed This Encounter   Procedures    Blood Culture 1    Blood Culture 2    XR CHEST PORTABLE    CT Head W/O Contrast    CBC with Auto Differential    CMP    Troponin    Acetaminophen Level    Salicylate    Lipase    Lactic Acid    Urinalysis    Urinalysis, Micro    Comprehensive Metabolic Panel w/ Reflex to MG    CBC with Auto Differential    ADULT DIET;  Regular    Cardiac Monitor - ED Only    Vital signs per unit routine    Up with assistance    HYPOGLYCEMIA TREATMENT: blood glucose LESS THAN 70 mg/dL and patient ALERT and TOLERATING PO    HYPOGLYCEMIA TREATMENT: blood glucose LESS THAN 70 mg/dL and patient NOT ALERT or NPO    Full code    Initiate Oxygen Therapy Protocol    POCT glucose    POCT Glucose    EKG 12 Lead    Straight Catheter    ADMIT TO INPATIENT        Medications   vancomycin (VANCOCIN) 2000 mg in 0.9% sodium chloride 500 mL IVPB (2,000 mg IntraVENous New Bag 10/29/22 0300)   sodium chloride flush 0.9 % injection 5-40 mL (has no administration in time range)   sodium chloride flush 0.9 % injection 5-40 mL (has no administration in time range)   0.9 % sodium chloride infusion (has no administration in time range)   enoxaparin (LOVENOX) injection 40 mg (has no administration in time range)   ondansetron (ZOFRAN-ODT) disintegrating tablet 4 mg (has no administration in time range)     Or   ondansetron (ZOFRAN) injection 4 mg (has no administration in time range)   polyethylene glycol (GLYCOLAX) packet 17 g (has no administration in time range)   acetaminophen (TYLENOL) tablet 650 mg (has no administration in time range)     Or   acetaminophen (TYLENOL) suppository 650 mg (has no administration in time range)   cefTRIAXone (ROCEPHIN) 1,000 mg in sodium chloride 0.9 % 50 mL IVPB mini-bag (has no administration in time range)     And   doxycycline hyclate (VIBRAMYCIN) capsule 100 mg (has no administration in time range)   0.9 % sodium chloride infusion ( IntraVENous New Bag 10/29/22 0405)   insulin lispro (HUMALOG) injection vial 0-8 Units (has no administration in time range)   insulin lispro (HUMALOG) injection vial 0-4 Units (has no administration in time range)   carvedilol (COREG) tablet 12.5 mg (has no administration in time range)   DULoxetine (CYMBALTA) extended release capsule 30 mg (has no administration in time range)   gabapentin (NEURONTIN) capsule 300 mg (has no administration in time range)   magnesium oxide (MAG-OX) tablet 500 mg (has no administration in time range)   atorvastatin (LIPITOR) tablet 10 mg (has no administration in time range)   topiramate (TOPAMAX) tablet 100 mg (has no administration in time range)   traZODone (DESYREL) tablet 50 mg (has no administration in time range)   glucose chewable tablet 16 g (has no administration in time range)   dextrose bolus 10% 125 mL (has no administration in time range)     Or   dextrose bolus 10% 250 mL (has no administration in time range)   glucagon (rDNA) injection 1 mg (has no administration in time range)   dextrose 10 % infusion (has no administration in time range)   lactated ringers bolus (1,000 mLs IntraVENous New Bag 10/29/22 0017)   piperacillin-tazobactam (ZOSYN) 4,500 mg in sodium chloride 0.9 % 100 mL IVPB (mini-bag) (0 mg IntraVENous Stopped 10/29/22 0215)       Current Discharge Medication List           Marlin Lofton is a 73 y.o. female who presents to the Emergency Department with chief complaint of    Chief Complaint   Patient presents with    Altered Mental Status     Was sepsis/ possible uti / bed bound for 3 days not norm      72-year-old female presents the emergency department via EMS with chief complaint of altered mental status. Patient is coming from [facility]. It is unsure of how long the patient's mentation has been like this apparently is somehow reported about 2 days. Patient is able answer some questions but otherwise is confused in the room. Review of Systems   Unable to perform ROS: Mental status change     Past Medical History:   Diagnosis Date    Arrhythmia     Arthritis     Atrial septal defect 10/26/2016    Small PFO.  Followed by Dr. Jimmy Barron    Autoimmune disease [facility])     fibromyalgia    Chronic pain     fibromyalgia; chronic back pain     Diarrhea 12/15/2010    Edema 10/26/2016    Fibromyalgia 12/11/2010    GERD (gastroesophageal reflux disease)     HTN (hypertension) 12/11/2010    Hypercapnic acidosis 12/11/2010    Hyperglycemia 12/11/2010    Major depressive disorder, recurrent ([address] 03/13/2017    Obesity, Class III, BMI 40-49.9 (morbid obesity) ([address]     also type II diabetes and sleep apnea    Opioid dependence ([address] Social History     Socioeconomic History    Marital status:    Tobacco Use    Smoking status: Never    Smokeless tobacco: Never   Vaping Use    Vaping Use: Never used   Substance and Sexual Activity    Alcohol use: No    Drug use: No   Social History Narrative    She is a never smoker, lives with her  and has two children that are accompanying her to the hospital.  She does not use illicit drugs apparently. Latex, Benzodiazepines, Diltiazem, Nsaids, and Amlodipine     Current Discharge Medication List        CONTINUE these medications which have NOT CHANGED    Details   traZODone (DESYREL) 50 MG tablet Take 1 tablet by mouth nightly  Qty: 3 tablet, Refills: 0      tiZANidine (ZANAFLEX) 4 MG tablet Take 1 tablet by mouth every 6 hours as needed (Back spasms)  Qty: 12 tablet, Refills: 0      SUMAtriptan (IMITREX) 100 MG tablet sumatriptan 100 mg tablet      ondansetron (ZOFRAN-ODT) 4 MG disintegrating tablet       spironolactone (ALDACTONE) 25 MG tablet spironolactone 25 mg tablet      pitavastatin (LIVALO) 2 MG TABS tablet Take 2 mg by mouth daily      !! carvedilol (COREG) 6.25 MG tablet Take 6.25 mg by mouth as needed Per pt depends on HR      gabapentin (NEURONTIN) 300 MG capsule Take 300 mg by mouth 3 times daily. !! DULoxetine (CYMBALTA) 60 MG extended release capsule Take 60 mg by mouth daily      ascorbic acid (VITAMIN C) 250 MG tablet Take by mouth daily      aspirin 81 MG chewable tablet Take 81 mg by mouth at bedtime Preventative Only-pt denies heart attack, stents, blood clots.  Per pt possibly had a TIA in the past      !! carvedilol (COREG) 12.5 MG tablet Take 12.5 mg by mouth as needed Per pt depends on HR      celecoxib (CELEBREX) 200 MG capsule Take 200 mg by mouth 2 times daily      chlorthalidone (HYGROTON) 25 MG tablet Take 25 mg by mouth as needed       vitamin D3 (CHOLECALCIFEROL) 125 MCG (5000 UT) TABS tablet Take by mouth daily      coenzyme Q10 200 MG CAPS capsule Take 200 mg by mouth daily       !! DULoxetine (CYMBALTA) 30 MG extended release capsule Take 30 mg by mouth at bedtime       estradiol (ESTRACE) 0.5 MG tablet Take 0.5 mg by mouth daily       ezetimibe (ZETIA) 10 MG tablet Take 10 mg by mouth daily      ferrous sulfate (IRON 325) 325 (65 Fe) MG tablet Take 325 mg by mouth Per pt takes 3-4 times a week      hydrALAZINE (APRESOLINE) 25 MG tablet Take 25 mg by mouth every 6 hours       insulin NPH (HUMULIN N;NOVOLIN N) 100 UNIT/ML injection vial Inject 10 Units into the skin every morning       insulin regular (HUMULIN R;NOVOLIN R) 100 UNIT/ML injection Inject into the skin Sliding scale      ketoconazole (NIZORAL) 2 % cream Apply topically as needed       levothyroxine (SYNTHROID) 125 MCG tablet Take by mouth every morning (before breakfast)      Lidocaine HCl 2.75 % LOTN Apply topically as needed       lisinopril (PRINIVIL;ZESTRIL) 40 MG tablet Take 40 mg by mouth at bedtime       loratadine (CLARITIN) 10 MG tablet Strength: 10 mg; Form: tablet; SIG: take 1 tab(s) orally once a day      magnesium oxide (MAG-OX) 400 MG tablet Take 500 mg by mouth 2 times daily      omeprazole (PRILOSEC) 20 MG delayed release capsule Take 20 mg by mouth 2 times daily      ondansetron (ZOFRAN) 4 MG tablet Take 4 mg by mouth every 8 hours as needed      topiramate (TOPAMAX) 100 MG tablet Take by mouth 2 times daily       ! ! - Potential duplicate medications found. Please discuss with provider. Vitals signs and nursing note reviewed. Patient Vitals for the past 4 hrs:   Pulse Resp BP SpO2   10/29/22 0202 96 21 -- 100 %   10/29/22 0112 92 -- (!) 128/59 --          Physical Exam  Vitals and nursing note reviewed. Constitutional:       General: She is not in acute distress. Appearance: Normal appearance. She is not ill-appearing. HENT:      Head: Normocephalic and atraumatic.       Right Ear: Tympanic membrane normal.      Left Ear: Tympanic membrane normal. Mouth/Throat:      Mouth: Mucous membranes are moist.      Pharynx: Oropharynx is clear. Eyes:      Extraocular Movements: Extraocular movements intact. Pupils: Pupils are equal, round, and reactive to light. Cardiovascular:      Rate and Rhythm: Normal rate and regular rhythm. Heart sounds: No murmur heard. Pulmonary:      Effort: No respiratory distress. Breath sounds: Rhonchi present. No wheezing. Abdominal:      Palpations: Abdomen is soft. There is no mass. Tenderness: There is no abdominal tenderness. There is no guarding. Musculoskeletal:         General: No swelling or tenderness. Cervical back: Normal range of motion and neck supple. No rigidity or tenderness. Skin:     General: Skin is warm and dry. Capillary Refill: Capillary refill takes less than 2 seconds. Neurological:      General: No focal deficit present. Mental Status: She is alert. GCS: GCS eye subscore is 3. GCS verbal subscore is 5. GCS motor subscore is 6. Comments: ANO x1 to person place and time. Patient is able to move all 4 extremities. Psychiatric:         Mood and Affect: Mood normal.         Behavior: Behavior normal.        Procedures    Results for orders placed or performed during the hospital encounter of 10/28/22   XR CHEST PORTABLE    Narrative    EXAM: Chest x-ray. INDICATION: Dyspnea. COMPARISON: Prior chest x-ray on October 4, 2022. TECHNIQUE: Frontal view chest x-ray. FINDINGS: There is new left lung base atelectasis or infiltrate. Right lung is  clear. Again noted is cardiomegaly and elevation of the right diaphragm. No  pneumothorax or significant pleural effusion is seen. Impression    New left lung base atelectasis or pneumonia. CT Head W/O Contrast    Narrative    EXAM: Noncontrast CT head. INDICATION: Mental status changes. COMPARISON: Prior MRI brain on December 13, 2010. TECHNIQUE: Noncontrast CT images of the head were obtained. Radiation dose  reduction techniques were used for this study. Our CT scanners use one or all  of the following:  Automated exposure control, adjustment of the mA or kV  according to patient size, iterative reconstruction. FINDINGS: Brain volume is appropriate for age. No acute infarct, hemorrhage or  mass is identified. There is no mass effect, midline shift or depressed  fracture. The visualized paranasal sinuses and mastoid air cells are clear. Impression    No acute process.    CBC with Auto Differential   Result Value Ref Range    WBC 16.8 (H) 4.3 - 11.1 K/uL    RBC 3.63 (L) 4.05 - 5.2 M/uL    Hemoglobin 10.8 (L) 11.7 - 15.4 g/dL    Hematocrit 35.0 (L) 35.8 - 46.3 %    MCV 96.4 82.0 - 102.0 FL    MCH 29.8 26.1 - 32.9 PG    MCHC 30.9 (L) 31.4 - 35.0 g/dL    RDW 13.7 11.9 - 14.6 %    Platelets 705 178 - 818 K/uL    MPV 9.8 9.4 - 12.3 FL    nRBC 0.00 0.0 - 0.2 K/uL    Differential Type AUTOMATED      Seg Neutrophils 70 43 - 78 %    Lymphocytes 19 13 - 44 %    Monocytes 7 4.0 - 12.0 %    Eosinophils % 3 0.5 - 7.8 %    Basophils 1 0.0 - 2.0 %    Immature Granulocytes 1 0.0 - 5.0 %    Segs Absolute 11.7 (H) 1.7 - 8.2 K/UL    Absolute Lymph # 3.2 0.5 - 4.6 K/UL    Absolute Mono # 1.1 0.1 - 1.3 K/UL    Absolute Eos # 0.6 0.0 - 0.8 K/UL    Basophils Absolute 0.1 0.0 - 0.2 K/UL    Absolute Immature Granulocyte 0.2 0.0 - 0.5 K/UL   CMP   Result Value Ref Range    Sodium 135 133 - 143 mmol/L    Potassium 4.1 3.5 - 5.1 mmol/L    Chloride 105 101 - 110 mmol/L    CO2 21 21 - 32 mmol/L    Anion Gap 9 2 - 11 mmol/L    Glucose 156 (H) 65 - 100 mg/dL    BUN 50 (H) 8 - 23 MG/DL    Creatinine 2.00 (H) 0.6 - 1.0 MG/DL    Est, Glom Filt Rate 27 (L) >60 ml/min/1.73m2    Calcium 8.9 8.3 - 10.4 MG/DL    Total Bilirubin 0.2 0.2 - 1.1 MG/DL    ALT 17 12 - 65 U/L    AST 23 15 - 37 U/L    Alk Phosphatase 77 50 - 136 U/L    Total Protein 8.2 6.3 - 8.2 g/dL    Albumin 2.3 (L) 3.2 - 4.6 g/dL    Globulin 5.9 (H) 2.8 - 4.5 g/dL Albumin/Globulin Ratio 0.4 0.4 - 1.6     Troponin   Result Value Ref Range    Troponin, High Sensitivity 25.4 (H) 0 - 14 pg/mL   Acetaminophen Level   Result Value Ref Range    Acetaminophen Level <2 (L) 10 - 30 ug/mL   Salicylate   Result Value Ref Range    Salicylate, Serum <5.3 (L) 2.8 - 20.0 MG/DL   Lipase   Result Value Ref Range    Lipase 34 (L) 73 - 393 U/L   Lactic Acid   Result Value Ref Range    Lactic Acid, Plasma 1.0 0.4 - 2.0 MMOL/L   Urinalysis   Result Value Ref Range    Color, UA DARK YELLOW      Appearance CLOUDY      Specific Gravity, UA 1.026 (H) 1.001 - 1.023      pH, Urine 5.0 5.0 - 9.0      Protein, UA 30 (A) NEG mg/dL    Glucose, UA Negative mg/dL    Ketones, Urine TRACE (A) NEG mg/dL    Bilirubin Urine MODERATE (A) NEG      Blood, Urine Negative NEG      Urobilinogen, Urine 0.2 0.2 - 1.0 EU/dL    Nitrite, Urine Negative NEG      Leukocyte Esterase, Urine TRACE (A) NEG     Urinalysis, Micro   Result Value Ref Range    WBC, UA 0-3 0 /hpf    RBC, UA 0-3 0 /hpf    Epithelial Cells UA 5-10 0 /hpf    BACTERIA, URINE 2+ (H) 0 /hpf    Casts 0 0 /lpf    Crystals 0 0 /LPF    Mucus, UA 0 0 /lpf        CT Head W/O Contrast   Final Result   No acute process. XR CHEST PORTABLE   Final Result   New left lung base atelectasis or pneumonia. Voice dictation software was used during the making of this note. This software is not perfect and grammatical and other typographical errors may be present. This note has not been completely proofread for errors.      Susan Subramanian DO  10/29/22 4094 yes

## 2022-11-01 PROBLEM — E66.9 OBESITY (BMI 30-39.9): Status: ACTIVE | Noted: 2021-07-02

## 2022-11-01 PROBLEM — U07.1 COVID-19 WITH PULMONARY COMORBIDITY: Status: ACTIVE | Noted: 2022-01-01

## 2022-11-01 NOTE — ASSESSMENT
[FreeTextEntry1] : A:\par COPD  \par COVID pneumonia\par hypoxia\par plan:\par Stress compliance with inhalers. Renewed as needed.\par cont PRN albuterol\par cont ICS/LABA\par O2 : the patient can take the oxygen off while relaxing at rest.  She needs to continue it during exercise and sleep\par weight loss\par Try to increase exercise daily\par \par A:\par FLAVIO\par Obesity\par \par PLAN:\par The patient is benefitting from the PAP device .\par New supplies ordered \par Weight loss discussed\par I stressed the need maintain compliance  with the PAP device.\par The patient is not to use an Ozone or UV sterilizer. \par \par  aggressive care by cardiology.\par \par Long discussions about the COVID infection and recurrence.  \par \par I reviewed the entire case with the patient's  who was in attendance today.\par \par \par \par \par \par

## 2022-11-01 NOTE — REASON FOR VISIT
[Follow-Up] : a follow-up visit [COPD] : COPD [TextBox_44] : hx COPD and COVID multiple complaints continue has difficulty walking

## 2022-11-01 NOTE — PHYSICAL EXAM
[No Acute Distress] : no acute distress [Normal Oropharynx] : normal oropharynx [Normal Appearance] : normal appearance [No Neck Mass] : no neck mass [Normal Rate/Rhythm] : normal rate/rhythm [Normal S1, S2] : normal s1, s2 [No Murmurs] : no murmurs [No Resp Distress] : no resp distress [Clear to Auscultation Bilaterally] : clear to auscultation bilaterally [No Abnormalities] : no abnormalities [Benign] : benign [Normal Gait] : normal gait [No Clubbing] : no clubbing [No Cyanosis] : no cyanosis [No Edema] : no edema [FROM] : FROM [Normal Color/ Pigmentation] : normal color/ pigmentation [No Focal Deficits] : no focal deficits [Oriented x3] : oriented x3 [Normal Affect] : normal affect [TextBox_80] : Decreased breath sounds

## 2022-11-08 PROBLEM — R39.9 URINARY SYMPTOM OR SIGN: Status: ACTIVE | Noted: 2022-01-01

## 2022-11-08 PROBLEM — Z00.00 ENCOUNTER FOR PREVENTIVE HEALTH EXAMINATION: Status: ACTIVE | Noted: 2018-06-15

## 2022-11-08 NOTE — ASSESSMENT
[FreeTextEntry1] : I discussed fully the reddened patches on her bladder wall during cystoscopy may have been acute inflammation however I recommend another cystoscopy to ensure resolution since now the infection is gone.  She is agreeable. [Urinary Symptom or Sign (788.99\R39.89)] : implantation [Urinary Tract Infection (599.0\N39.0)] : qualitative ~C was positive

## 2022-11-08 NOTE — HISTORY OF PRESENT ILLNESS
[FreeTextEntry1] : 77-year-old with history of presumed UTIs.  Since last visit had another episode where she was told in the hospital she had a UTI.  Currently she feels well urinary wise.  Urinalysis is negative today.  I reviewed her hospital records from October and although she had presumed UTI her cultures were negative.  I reviewed Dr. Quigley's records including from cystoscopy there were reddened patches.  Her urine cytology was negative for any malignancy and showed acute inflammation.  FISH was negative.  CT scan from the hospital showed stable bilateral kidney cysts without change.  She does have severe COPD on home oxygen.  She does not feel she can ever go to sleep for any procedure

## 2022-11-29 PROBLEM — I65.23 ARTERIOSCLEROSIS OF BOTH CAROTID ARTERIES: Status: ACTIVE | Noted: 2020-11-12

## 2022-11-29 PROBLEM — G47.33 OBSTRUCTIVE SLEEP APNEA, ADULT: Status: ACTIVE | Noted: 2021-03-23

## 2022-11-29 PROBLEM — I10 HTN (HYPERTENSION): Status: ACTIVE | Noted: 2019-09-26

## 2022-11-29 PROBLEM — G45.9 TIA (TRANSIENT ISCHEMIC ATTACK): Status: ACTIVE | Noted: 2020-11-12

## 2022-11-29 PROBLEM — I50.9 CHF WITH CARDIOMYOPATHY: Status: ACTIVE | Noted: 2021-06-09

## 2022-11-29 PROBLEM — I25.10 CAD (CORONARY ARTERY DISEASE): Status: ACTIVE | Noted: 2021-11-01

## 2022-11-29 PROBLEM — I48.0 PAROXYSMAL ATRIAL FIBRILLATION: Status: ACTIVE | Noted: 2021-06-09

## 2022-11-29 PROBLEM — J44.9 COPD (CHRONIC OBSTRUCTIVE PULMONARY DISEASE): Status: ACTIVE | Noted: 2019-08-22

## 2022-11-29 PROBLEM — E78.00 HYPERCHOLESTEROLEMIA: Status: ACTIVE | Noted: 2019-12-23

## 2022-11-29 NOTE — DISCUSSION/SUMMARY
[FreeTextEntry1] : \par History htn copd emphysema inga sees now Jeffreyromelny.  . In hosp 11/18 hypotesion.. Was on verapramil Now off. Use to smoke.  Told need 2 gm na diet. Now edema resolved.  Had TIA. On home O2. She 12/20 covid pneumonia . She was on vent. Had neg stress in [place] 10/20. L carotid done by maddie 12/5/20.    Told resume  weight loss. ? pulmonary rehab.. Memory better.  Told try walk.  Mass neck. Told benign. To not remove per ENT now\par Admitted 10/21 sepsis uti pneumonia. Cath 10/21 no significant cad. Patient sob off o2.\par  Ef 55- 60%. She had 3 covid vaccines. She has afib on AC. If off o2 sat as llow 76%. Aware need always wear o2. Told daily weight\par  She 10/22 went to bathroom syncope.. In hospital UTI. She needs cysto. Now sees rhaboy. \par Echo 10/22 ef 55%- 60 %. Risk cysto intermediate. Told walk with walker

## 2022-11-29 NOTE — HISTORY OF PRESENT ILLNESS
[FreeTextEntry1] :  Dyspnea on exertion and fatigue, but no chest pain and no edema. On now 24 hour o2\par Admitted 10/21 sepsis uti pneumonia. Cath 10/21 no significant cad. If off o2 sat as llow 76%. Very \par OB off o2. She 10/22 went to bathroom syncope.. In hospital UTI. She needs cysto. Now sees rhaboy\par \par \par \par \par \par \par

## 2022-12-03 NOTE — PROCEDURE NOTE - PROCEDURE DATE TIME, MLM
Triage: fever cough x 3 days. Runny nose. Tylenol 10ml at 1300.  Motrin 10 ml around 12 29-Dec-2020 17:16

## 2022-12-07 NOTE — ED PROCEDURE NOTE - NS ED ATTENDING STATEMENT MOD
hard copy, drawn during this pregnancy I have personally performed a face to face diagnostic evaluation on this patient. I have reviewed the ACP note and agree with the history, exam and plan of care, except as noted.

## 2022-12-13 PROBLEM — N39.0 URINARY TRACT INFECTION: Status: ACTIVE | Noted: 2022-01-01

## 2022-12-13 NOTE — PHYSICAL EXAM
[General Appearance - In No Acute Distress] : no acute distress [Urethral Meatus] : the meatus of the urethra showed no abnormalities [Edema] : no peripheral edema [] : no respiratory distress [Oriented To Time, Place, And Person] : oriented to person, place, and time [Normal Station and Gait] : the gait and station were normal for the patient's age

## 2022-12-13 NOTE — HISTORY OF PRESENT ILLNESS
[FreeTextEntry1] : History of recurrent UTI symptoms.  Cystoscopy today shows persistence of reddened lesions in her bladder that was seen by previous urologist.  She is on home oxygen with severe COPD.  She continues on aspirin, Eliquis

## 2022-12-13 NOTE — ASSESSMENT
[Urinary Tract Infection (599.0\N39.0)] : qualitative ~C was positive [FreeTextEntry1] : Reddened lesion of the bladder rule out neoplastic versus inflammatory.  Recommend biopsy.  Risk benefits and alternatives discussed including infection, possible postop catheter, bleeding, perforation, and patient is at increased risk for cardiopulmonary side effects due to her severe comorbidities.  Will need cardiology and medical clearance.

## 2022-12-15 NOTE — ED PROVIDER NOTE - ATTENDING APP SHARED VISIT CONTRIBUTION OF CARE
Patient has a history of COPD and previous pneumothorax.  Today she complains of shortness of breath.  Vital signs noted.  Trachea is midline.  There is no subcutaneous emphysema.  There is decreased breath sounds on the right side.  Chest x-ray confirms pneumothorax with extensive scarring and loculation of air.  Patient is in no respiratory distress while on a nonrebreather.  My opinion, this pneumothorax should be treated with IR guidance.  I have arranged emergent transfer to the [facility] emergency department.  Case discussed with Dr. Whitney.

## 2022-12-15 NOTE — CONSULT NOTE ADULT - SUBJECTIVE AND OBJECTIVE BOX
INTERVENTIONAL RADIOLOGY CONSULT:     Procedure Requested: chest tube placement for PTX     HPI: Patient is a 77-year-old female history of COPD on 3 L nasal cannula, A. fib on Eliquis, pneumothorax x2 here for evaluation of atraumatic right upper back pain and shortness of breath that began this morning upon awakening around 6 AM.  Patient denies fever, chills, worsening cough, chest pain      PAST MEDICAL & SURGICAL HISTORY:  Hypertension      Depression      COPD (chronic obstructive pulmonary disease)      Other cardiomyopathy      Other emphysema      PVD (peripheral vascular disease)      Bipolar 1 disorder      FLAVIO on CPAP      Transient ischemic attack  x 3      Congestive heart failure      Falls      2019 novel coronavirus disease (COVID-19)      Respiratory failure requiring intubation      Afib      HLD (hyperlipidemia)      History of cholecystectomy      H/O carotid angioplasty          MEDICATIONS  (STANDING):    MEDICATIONS  (PRN):      Allergies    codeine (Other (Moderate))  Depakote (Unknown)  Dilaudid (Short breath; Rash)  IV Contrast (Anaphylaxis)  losartan (Angioedema)  Risperdal (Other)  verapamil (Short breath; Angioedema)    Intolerances    FAMILY HISTORY:  FH: heart disease (Father, Mother)        Physical Exam:   Vital Signs Last 24 Hrs  T(C): 36.4 (15 Dec 2022 10:40), Max: 36.4 (15 Dec 2022 10:40)  T(F): 97.5 (15 Dec 2022 10:40), Max: 97.5 (15 Dec 2022 10:40)  HR: 93 (15 Dec 2022 10:55) (93 - 102)  BP: 177/87 (15 Dec 2022 10:55) (177/87 - 221/99)  BP(mean): --  RR: 22 (15 Dec 2022 10:55) (22 - 24)  SpO2: 97% (15 Dec 2022 10:55) (94% - 97%)    Parameters below as of 15 Dec 2022 10:55  Patient On (Oxygen Delivery Method): nasal cannula  O2 Flow (L/min): 3      General:     Lungs:    Cardiovascular:     Abdomen:    Extremities:    Musculoskeletal:    Neuro/Psych:        Labs:                         10.6   6.47  )-----------( 294      ( 15 Dec 2022 11:30 )             34.9     12-15    141  |  102  |  33<H>  ----------------------------<  89  4.3   |  27  |  1.3    Ca    11.7<H>      15 Dec 2022 11:30  Mg     1.8     12-15    TPro  8.2<H>  /  Alb  4.2  /  TBili  0.2  /  DBili  x   /  AST  54<H>  /  ALT  27  /  AlkPhos  359<H>  12-15    PT/INR - ( 15 Dec 2022 11:30 )   PT: 15.30 sec;   INR: 1.33 ratio         PTT - ( 15 Dec 2022 11:30 )  PTT:34.2 sec    Pertinent labs:                      10.6   6.47  )-----------( 294      ( 15 Dec 2022 11:30 )             34.9       12-15    141  |  102  |  33<H>  ----------------------------<  89  4.3   |  27  |  1.3    Ca    11.7<H>      15 Dec 2022 11:30  Mg     1.8     12-15    TPro  8.2<H>  /  Alb  4.2  /  TBili  0.2  /  DBili  x   /  AST  54<H>  /  ALT  27  /  AlkPhos  359<H>  12-15      PT/INR - ( 15 Dec 2022 11:30 )   PT: 15.30 sec;   INR: 1.33 ratio         PTT - ( 15 Dec 2022 11:30 )  PTT:34.2 sec    Radiology & Additional Studies:     Impression:  Moderate-sized right pneumothorax. Tension physiology is not excluded.    Discussed with Dr. Myles at 1235H on 12/15/2022    --- End of Report ---      Radiology imaging reviewed.       ASSESSMENT/ PLAN:   Patient is a 77-year-old female history of COPD on 3 L nasal cannula, A. fib on Eliquis, pneumothorax x2 here for evaluation of atraumatic right upper back pain and shortness of breath that began this morning upon awakening around 6 AM. CXR confirms moderate sized right pneumothorax. Called by ED PA, stated patient was to be transferred to [facility] for chest tube placement. IR requested for placement.  - would not recommend transporting patient at this time given acute status  - recommend CT chest for further evaluation and [facility] surgery consult for chest tube placement   - no IR intervention warranted at this time     Thank you for the courtesy of this consult, please call [phone] with any further questions.

## 2022-12-15 NOTE — ED PROVIDER NOTE - OBJECTIVE STATEMENT
Patient is a 77-year-old female history of COPD on 3 L nasal cannula, A. fib on Eliquis, pneumothorax x2 here for evaluation of atraumatic right upper back pain and shortness of breath that began this morning upon awakening around 6 AM.  Patient denies fever, chills, worsening cough, chest pain

## 2022-12-15 NOTE — ED ADULT TRIAGE NOTE - SPO2 (%)
[id number] at least 4, agree with anticoagulation for CVA prophylaxis. Currently in sinus rhythm.    94

## 2022-12-15 NOTE — ED PROVIDER NOTE - CLINICAL SUMMARY MEDICAL DECISION MAKING FREE TEXT BOX
77-year-old female with COPD on 3 L nasal cannula, A. fib on Eliquis, pneumothorax x2 in the past, presents with atraumatic right upper back pain associated with shortness of breath, found to have moderate sized right pneumothorax.  Due to concern for difficult chest tube placement discussion was had with IR and transfer to [facility].  ct Surgery and interventional radiology aware of patient and ct surgery requesting CT chest with no contrast.  Imaging reviewed, patient on nonrebreather saturating 99%, no respiratory distress, speaking full sentences. CT surgery placed pigtail, ordered repeat chest x-ray, report admission to surgery under Dr. Lowe .  Patient comfortable.  Saturation 98.  Admitted as discussed with surgery and patient.

## 2022-12-15 NOTE — H&P ADULT - ASSESSMENT
77F PMH COPD on 3-4 L nasal cannula at home, A. fib on Eliquis, pneumothorax x2, h/o pleurodesis on the right 15 years ago, CHF, BP1, Depression, HTN, FLAVIO on CPAP, emphysema, PVD, h/o COVID requiring intubation, carotid angioplasty, cholecystectomy, TIA x3, here for evaluation of atraumatic right upper back pain and shortness of breath that began this morning upon awakening around 6 AM.  Patient denies fever, chills, worsening cough, chest pain. Patient was transferred to [facility] from [facility] with complicated PTX. Physical exam findings, imaging, and labs as documented above.     PLAN:  - Admit to surgical service  - may have regular diet  - DVT ppx  - GI ppx  - hemodynamic monitoring/vital signs q4h  - Strict Is and Os q4h  - Pain control  - activity as tolerated  - Imaging: Daily AM CXR  - incentive spirometer  - aspiration precautions  - 8pm labs  - chest tube to suction  - monitor O2 sat  - monitor output  - Monitor for airleaks    Lines/Tubes: PIV, R chest tube placed 12/15/22    Above plan discussed with Attending Surgeon Dr. Gerardo Lowe, patient, patient family, and ED    12-15-22 @ 18:19

## 2022-12-15 NOTE — ED PROVIDER NOTE - PHYSICAL EXAMINATION
VITAL SIGNS: I have reviewed nursing notes and confirm.  CONSTITUTIONAL: Well-developed; well-nourished  SKIN: skin exam is warm and dry, no acute rash.    HEAD: Normocephalic; atraumatic.  EYES: conjunctiva and sclera clear.  ENT: No nasal discharge; airway clear.  NECK: Supple; non tender.  CARD: S1, S2 normal; no murmurs, gallops, or rubs. Regular rate and rhythm.   RESP: diminished right sided breath sounds   ABD: Normal bowel sounds; soft; non-distended; non-tender  EXT: Normal ROM.  No clubbing, cyanosis or edema.   LYMPH: No acute cervical adenopathy.  NEURO: Alert, oriented, grossly unremarkable  PSYCH: Cooperative, appropriate.

## 2022-12-15 NOTE — ED PROVIDER NOTE - NS ED ROS FT
Eyes:  No visual changes, eye pain or discharge.  ENMT:  No hearing changes, pain, discharge or infections. No neck pain or stiffness.  Cardiac:  No chest pain,  edema.  Respiratory:  + SOB No cough or respiratory distress. No hemoptysis. No history of asthma or RAD.  GI:  No nausea, vomiting, diarrhea or abdominal pain.  :  No dysuria, frequency or burning.  MS:  + back pain No myalgia, muscle weakness, joint pain  Neuro:  No headache or weakness.  No LOC.  Skin:  No skin rash.   Endocrine: No history of thyroid disease or diabetes.  Except as documented in the HPI,  all other systems are negative.

## 2022-12-15 NOTE — ED ADULT TRIAGE NOTE - CHIEF COMPLAINT QUOTE
BIBA, as per EMS and pt "I woke up having back pain, then it traveled to my neck, I become very SOB at that point. On home O2 3LNC, hx of COPD; NRB placed in field as pt was stating 78%". Upon ED arrival, pt 80% RA, put on her normal 3LNC and O2 is 94%

## 2022-12-15 NOTE — H&P ADULT - NSHPPHYSICALEXAM_GEN_ALL_CORE
VITALS:  T(F): 98 (12-15-22 @ 17:25), Max: 98 (12-15-22 @ 17:25)  HR: 86 (12-15-22 @ 17:25) (81 - 102)  BP: 162/73 (12-15-22 @ 13:36) (162/73 - 221/99)  RR: 26 (12-15-22 @ 17:25) (22 - 26)  SpO2: 96% (12-15-22 @ 17:25) (94% - 97%)    PHYSICAL EXAM:  General: mild stress 2/2 to pain, on nonrebreather  HEENT: Normocephalic, atraumatic, trachea midline  Heart: s1, s2  Lungs:. Symmetric chest wall rise and fall. mild dyspnea  Abdomen: Soft, non-tender, non-distended. No rebound or guarding.  MSK/Extremities: Warm & well-perfused.   Skin: Warm, dry. No jaundice.

## 2022-12-15 NOTE — ED ADULT NURSE REASSESSMENT NOTE - NS ED NURSE REASSESS COMMENT FT1
Pt being transported lights and sirens to the [facility]. Report given to GEOVANI Green, crit lead Geovani andre, message sent via teams, charge Geovani Green aware. Awaiting transport to the [facility]. liver cyst

## 2022-12-15 NOTE — ED PROVIDER NOTE - WR ORDER NAME 1
Subjective   Pari Howell is a 72 y.o. female.   Chief Complaint   Patient presents with   • Hyperlipidemia   • Diabetes       Hyperlipidemia  Pertinent negatives include no chest pain, myalgias or shortness of breath.   Diabetes  Pertinent negatives for hypoglycemia include no confusion, headaches, nervousness/anxiousness, pallor, seizures or speech difficulty. Pertinent negatives for diabetes include no chest pain, no fatigue, no polydipsia and no polyphagia.      F/u on HTN, dm, and hlp. HTN has been stable. No cp or SOA. No HA. No heart palpitations.  HLP stable. Tolerating med. Working on low carb diet.  DM stable. Eye exam and foot exam up to date.  Hypothyroid has been stable on synthroid.  Itching all over for few weeks. Takes benadryl at night which helps. No fever.  The following portions of the patient's history were reviewed and updated as appropriate: allergies, current medications, past family history, past medical history, past social history, past surgical history and problem list.    Review of Systems   Constitutional: Negative for activity change, appetite change, chills, diaphoresis, fatigue, fever and unexpected weight change.   HENT: Negative for congestion, ear discharge, ear pain, mouth sores, nosebleeds, sinus pressure, sneezing and sore throat.    Eyes: Negative for pain, discharge and itching.   Respiratory: Negative for cough, chest tightness, shortness of breath and wheezing.    Cardiovascular: Negative for chest pain, palpitations and leg swelling.   Gastrointestinal: Negative for abdominal pain, constipation, diarrhea, nausea and vomiting.   Endocrine: Negative for cold intolerance, heat intolerance, polydipsia and polyphagia.   Genitourinary: Negative for dysuria, flank pain, frequency, hematuria and urgency.   Musculoskeletal: Negative for arthralgias, back pain, gait problem, myalgias, neck pain and neck stiffness.   Skin: Negative for color change, pallor and rash.  "  Neurological: Negative for seizures, speech difficulty, numbness and headaches.   Psychiatric/Behavioral: Negative for agitation, confusion, decreased concentration and sleep disturbance. The patient is not nervous/anxious.      /80   Pulse 78   Ht 170.2 cm (67\")   Wt 83.5 kg (184 lb)   LMP  (LMP Unknown)   SpO2 98%   BMI 28.82 kg/m²     Objective   Physical Exam   Constitutional: She is oriented to person, place, and time. She appears well-developed.   HENT:   Head: Normocephalic.   Right Ear: External ear normal.   Left Ear: External ear normal.   Nose: Nose normal.   Eyes: Pupils are equal, round, and reactive to light. Conjunctivae are normal.   Neck: No JVD present. No thyromegaly present.   Cardiovascular: Normal rate, regular rhythm and normal heart sounds. Exam reveals no friction rub.   No murmur heard.  Pulmonary/Chest: Effort normal and breath sounds normal. No respiratory distress. She has no wheezes. She has no rales.   Abdominal: Soft. Bowel sounds are normal. She exhibits no distension. There is no abdominal tenderness. There is no guarding.   Musculoskeletal: No tenderness.   Lymphadenopathy:     She has no cervical adenopathy.   Neurological: She is alert and oriented to person, place, and time. She displays normal reflexes. No cranial nerve deficit.   Skin: No rash noted.   Psychiatric: Her behavior is normal.   Nursing note and vitals reviewed.      Assessment/Plan   Diagnoses and all orders for this visit:    1. Type 2 diabetes mellitus without complication, without long-term current use of insulin (CMS/[facility]) (Primary)  -     glimepiride (AMARYL) 2 MG tablet; Take 1 tablet by mouth Every Morning Before Breakfast.  Dispense: 90 tablet; Refill: 0  -     POC Glycosylated Hemoglobin (Hb A1C)  -     Ambulatory Referral to Endocrinology  A1c 8.3. Could not tolerate metformin. On Amaryl  2mg , will increase to bid until endo.eval. Tradjenta did not help. Insurance would not cover  Trulicity " or Januvia.   2. Hypothyroidism, unspecified type  -     levothyroxine (SYNTHROID, LEVOTHROID) 50 MCG tablet; Take 1 tablet by mouth Daily.  Dispense: 90 tablet; Refill: 1    3. Essential hypertension  -     losartan-hydrochlorothiazide (HYZAAR) 100-12.5 MG per tablet; Take 1 tablet by mouth Daily.  Dispense: 90 tablet; Refill: 0  -     amLODIPine (NORVASC) 5 MG tablet; Take 2 tablets by mouth Daily.  Dispense: 90 tablet; Refill: 0    4. Gastroesophageal reflux disease without esophagitis  -     pantoprazole (PROTONIX) 20 MG EC tablet; Take 1 tablet by mouth Daily.  Dispense: 90 tablet; Refill: 0    Other orders  -     ezetimibe (Zetia) 10 MG tablet; Take 1 tablet by mouth Daily.  Dispense: 90 tablet; Refill: 1                Xray Chest 1 View- PORTABLE-Urgent

## 2022-12-15 NOTE — ED PROVIDER NOTE - PROGRESS NOTE DETAILS
What Is The Reason For Today's Visit?: Mole Check Additional History: Pt here to recheck speckled nevus on left anterior distal thigh, pt does not think it has changed Right-sided pneumo, will be difficult chest tube placement given location of the lung, discussed case with surgery and IR will transfer ED to ED.  Patient stable at this time on nonrebreather ED Attending SUSI Rodas  Patient transferred from [facility].  77-year-old female with COPD on 3 L nasal cannula, A. fib on Eliquis, pneumothorax x2 in the past, presents with atraumatic right upper back pain associated with shortness of breath, found to have moderate sized right pneumothorax.  Due to concern for difficult chest tube placement discussion was had with IR and transfer to [facility].  Surgery and interventional radiology aware of patient and requesting CT chest with no contrast.  Patient on nonrebreather saturating 99%, no respiratory distress, speaking full sentences.  Patient aware of plan.  will continue to monitor and reassess. ED Attending SUSI Rodas  Surgery report they will be placing the chest tube, no need for IR at this time. RK: received pt from [facility], patient currently satting 99%  /80 without increased WOB, CT surgery Dr. Howard aware, recommended CT non con chest. spoke with IR, stated CT surgery will be the one placing chest tube. ED Attending SUSI Rodas  Patient transferred from [facility].  77-year-old female with COPD on 3 L nasal cannula, A. fib on Eliquis, pneumothorax x2 in the past, presents with atraumatic right upper back pain associated with shortness of breath, found to have moderate sized right pneumothorax.  Due to concern for difficult chest tube placement discussion was had with IR and transfer to [facility].  ct Surgery and interventional radiology aware of patient and ct surgery requesting CT chest with no contrast.  Patient on nonrebreather saturating 99%, no respiratory distress, speaking full sentences.  Patient aware of plan.  will continue to monitor and reassess. ED Attending SUSI Rodas  ct Surgery report they will be placing the chest tube, no need for IR at this time. RK right sided pigtail chest tube placed with CT surgeon attending at bedside. ED Attending SUSI Rodas  CT surgery placed pigtail, ordered repeat chest x-ray, report admission to surgery under Dr. Lowe .  Patient comfortable.  Saturation 98.  Admitted as discussed with surgery and patient.

## 2022-12-15 NOTE — H&P ADULT - NSHPLABSRESULTS_GEN_ALL_CORE
LAB/STUDIES:                     10.6   6.47  )-----------( 294      ( 15 Dec 2022 11:30 )             34.9     12-15  141  |  102  |  33<H>  ----------------------------<  89  4.3   |  27  |  1.3    Ca    11.7<H>      15 Dec 2022 11:30  Mg     1.8     12-15    TPro  8.2<H>  /  Alb  4.2  /  TBili  0.2  /  DBili  x   /  AST  54<H>  /  ALT  27  /  AlkPhos  359<H>  12-15    PT/INR - ( 15 Dec 2022 11:30 )   PT: 15.30 sec;   INR: 1.33 ratio    PTT - ( 15 Dec 2022 11:30 )  PTT:34.2 sec  LIVER FUNCTIONS - ( 15 Dec 2022 11:30 )  Alb: 4.2 g/dL / Pro: 8.2 g/dL / ALK PHOS: 359 U/L / ALT: 27 U/L / AST: 54 U/L / GGT: x             CARDIAC MARKERS ( 15 Dec 2022 11:30 )  x     / <0.01 ng/mL / x     / x     / x          IMAGING:  < from: Xray Chest 1 View- PORTABLE-Urgent (Xray Chest 1 View- PORTABLE-Urgent .) (12.15.22 @ 11:54) >  Impression:  Moderate-sized right pneumothorax. Tension physiology is not excluded.    Discussed with Dr. Myles at 1235H on 12/15/2022    --- End of Report ---  MARILIN PERSON MD; Attending Radiologist  This document has been electronically signed Dec 15 2022 12:35PM  < end of copied text >      < from: CT Chest No Cont (12.15.22 @ 14:53) >  Lungs, Pleura, and Airways:There is a large tension pneumothorax.   Approximately 80% of the lung appears involved. There is shift of the   mediastinum to the left. There is complete collapse of the right lower   lobe. Almost complete collapse or consolidation is seen of the right   upper lobe and right middle lung.    Extensive emphysematous changes are seen within the left lung with   associated emphysematous and bullous changes. There is interstitial edema   with left basilar atelectasis.    Mediastinum/Lymph Nodes:As described above, there is shift of mediastinum   to the left. There are subcentimeter mediastinal lymph nodes. Prevascular   lymph nodes are noted measuring up to 1.6 cm. There is suggestion of left   hilar adenopathy, difficult to evaluate due to lack of intravenous   contrast.    Heart/Great Vessels: Calcifications are seen of the great vessels.   Extensive calcification is seen of the thoracic aorta. Heart enlarged.   There are coronary artery calcifications.      IMPRESSION:  Large right-sided tension pneumothorax with collapse of the right lung as   described. Shift of mediastinum to the left. Significant emphysematous   changes within the left lung.    Nonspecific mildly prominent lymph nodes. Atherosclerotic changes.    [facility] was notified of these findings during the time   interpretation on December 15, 2022 at 3:15 PM    --- End of Report ---  ARIC GUTIÉRREZ MD; Attending Radiologist  This document has been electronically signed. Dec 15 2022  3:19PM    ACCESS DEVICES:  [X] Peripheral IV  [X] R Chest tube 14Fr pigtail placed 12/15/22

## 2022-12-15 NOTE — H&P ADULT - HISTORY OF PRESENT ILLNESS
GENERAL SURGERY CONSULT NOTE    Patient: SHAUN COATES , 77y ([birth year])Female   MRN: [id number]  Location: [place] [address] A  Visit: 12-15-22 Inpatient  Date: 12-15-22 @ 18:19    HPI:  77F PMH COPD on 3-4 L nasal cannula at home, A. fib on Eliquis, pneumothorax x2, h/o pleurodesis on the right 15 years ago, CHF, BP1, Depression, HTN, FLAVIO on CPAP, emphysema, PVD, h/o COVID requiring intubation, carotid angioplasty, cholecystectomy, TIA x3, here for evaluation of atraumatic right upper back pain and shortness of breath that began this morning upon awakening around 6 AM.  Patient denies fever, chills, worsening cough, chest pain. Patient was transferred to [facility] from [facility] with complicated PTX.    PAST MEDICAL & SURGICAL HISTORY:  Hypertension      Depression      COPD (chronic obstructive pulmonary disease)      Other cardiomyopathy      Other emphysema      PVD (peripheral vascular disease)      Bipolar 1 disorder      FLAVIO on CPAP      Transient ischemic attack  x 3      Congestive heart failure      Falls      2019 novel coronavirus disease (COVID-19)      Respiratory failure requiring intubation      Afib      HLD (hyperlipidemia)      History of cholecystectomy      H/O carotid angioplasty          Home Medications:  albuterol 90 mcg/inh inhalation aerosol with adapter: 2 puff(s) inhaled every 4 hours, As Needed (23 Jun 2022 11:49)  aspirin 81 mg oral tablet:  (18 Jun 2022 08:53)  atorvastatin 20 mg oral tablet: 1 tab(s) orally once a day (18 Jun 2022 08:53)  Breo Ellipta 200 mcg-25 mcg/inh inhalation powder: puff(s) inhaled once a day (18 Jun 2022 08:53)  Eliquis 5 mg oral tablet: 1 tab(s) orally 2 times a day (18 Jun 2022 08:53)  freetext medication     -: 1 puff(s) inhaled once a day (23 Jun 2022 11:44)  gabapentin 400 mg oral capsule: 1 cap(s) orally 2 times a day (23 Jun 2022 11:44)  Incruse Ellipta 62.5 mcg/inh inhalation powder: 1 puff(s) inhaled every 24 hours (18 Jun 2022 08:53)  lamoTRIgine 100 mg oral tablet: 1 tab(s) orally 2 times a day (18 Jun 2022 08:53)  lidocaine 4% topical film: Apply topically to affected area once a day (21 Oct 2022 11:57)  LORazepam 0.5 mg oral tablet: 1  orally 2 times a day, As Needed for agitation (23 Jun 2022 11:44)  metoprolol tartrate 75 mg oral tablet: 1 tab(s) orally 2 times a day (18 Jun 2022 08:53)  montelukast 10 mg oral tablet: 1 tab(s) orally once a day (18 Jun 2022 08:53)  pantoprazole 40 mg oral delayed release tablet: 1 tab(s) orally once a day (18 Jun 2022 08:53)  polyethylene glycol 3350 oral powder for reconstitution: 17 gram(s) orally once a day (23 Jun 2022 12:08)  QUEtiapine 200 mg oral tablet: 250 milligram(s) orally once a day (at bedtime) (23 Jun 2022 11:44)  Xyzal: 1 tab(s) orally once a day (23 Jun 2022 11:44)      MEDICATIONS  (STANDING):  enoxaparin Injectable 40 milliGRAM(s) SubCutaneous every 24 hours  lidocaine   4% Patch 1 Patch Transdermal every 24 hours    MEDICATIONS  (PRN):

## 2022-12-16 NOTE — CONSULT NOTE ADULT - SUBJECTIVE AND OBJECTIVE BOX
Patient is a 77y old  Female who presents with a chief complaint of     HPI:  GENERAL SURGERY CONSULT NOTE    Patient: SHAUN COATES , 77y ([birth year])Female   MRN: [id number]  Location: [address]  Visit: 12-15-22 Inpatient  Date: 12-15-22 @ 18:19    HPI:  77F PMH COPD on 3-4 L nasal cannula at home, A. fib on Eliquis, pneumothorax x2, h/o pleurodesis on the right 15 years ago, CHF, BP1, Depression, HTN, FLAVIO on CPAP, emphysema, PVD, h/o COVID requiring intubation, carotid angioplasty, cholecystectomy, TIA x3, here for evaluation of atraumatic right upper back pain and shortness of breath that began this morning upon awakening around 6 AM.  Patient denies fever, chills, worsening cough, chest pain. Patient was transferred to [facility] from [facility] with complicated PTX.    PAST MEDICAL & SURGICAL HISTORY:  Hypertension      Depression      COPD (chronic obstructive pulmonary disease)      Other cardiomyopathy      Other emphysema      PVD (peripheral vascular disease)      Bipolar 1 disorder      FLAVIO on CPAP      Transient ischemic attack  x 3      Congestive heart failure      Falls      2019 novel coronavirus disease (COVID-19)      Respiratory failure requiring intubation      Afib      HLD (hyperlipidemia)      History of cholecystectomy      H/O carotid angioplasty          Home Medications:  albuterol 90 mcg/inh inhalation aerosol with adapter: 2 puff(s) inhaled every 4 hours, As Needed (23 Jun 2022 11:49)  aspirin 81 mg oral tablet:  (18 Jun 2022 08:53)  atorvastatin 20 mg oral tablet: 1 tab(s) orally once a day (18 Jun 2022 08:53)  Breo Ellipta 200 mcg-25 mcg/inh inhalation powder: puff(s) inhaled once a day (18 Jun 2022 08:53)  Eliquis 5 mg oral tablet: 1 tab(s) orally 2 times a day (18 Jun 2022 08:53)  freetext medication     -: 1 puff(s) inhaled once a day (23 Jun 2022 11:44)  gabapentin 400 mg oral capsule: 1 cap(s) orally 2 times a day (23 Jun 2022 11:44)  Incruse Ellipta 62.5 mcg/inh inhalation powder: 1 puff(s) inhaled every 24 hours (18 Jun 2022 08:53)  lamoTRIgine 100 mg oral tablet: 1 tab(s) orally 2 times a day (18 Jun 2022 08:53)  lidocaine 4% topical film: Apply topically to affected area once a day (21 Oct 2022 11:57)  LORazepam 0.5 mg oral tablet: 1  orally 2 times a day, As Needed for agitation (23 Jun 2022 11:44)  metoprolol tartrate 75 mg oral tablet: 1 tab(s) orally 2 times a day (18 Jun 2022 08:53)  montelukast 10 mg oral tablet: 1 tab(s) orally once a day (18 Jun 2022 08:53)  pantoprazole 40 mg oral delayed release tablet: 1 tab(s) orally once a day (18 Jun 2022 08:53)  polyethylene glycol 3350 oral powder for reconstitution: 17 gram(s) orally once a day (23 Jun 2022 12:08)  QUEtiapine 200 mg oral tablet: 250 milligram(s) orally once a day (at bedtime) (23 Jun 2022 11:44)  Xyzal: 1 tab(s) orally once a day (23 Jun 2022 11:44)      MEDICATIONS  (STANDING):  enoxaparin Injectable 40 milliGRAM(s) SubCutaneous every 24 hours  lidocaine   4% Patch 1 Patch Transdermal every 24 hours    MEDICATIONS  (PRN):   (15 Dec 2022 18:19)      PAST MEDICAL & SURGICAL HISTORY:  Hypertension      Depression      COPD (chronic obstructive pulmonary disease)      Other cardiomyopathy      Other emphysema      PVD (peripheral vascular disease)      Bipolar 1 disorder      FLAVIO on CPAP      Transient ischemic attack  x 3      Congestive heart failure      Falls      2019 novel coronavirus disease (COVID-19)      Respiratory failure requiring intubation      Afib      HLD (hyperlipidemia)      History of cholecystectomy      H/O carotid angioplasty          SOCIAL HX:   Smoking                         ETOH                            Other    FAMILY HISTORY:  FH: heart disease (Father, Mother)    .  No cardiovascular or pulmonary family history     REVIEW OF SYSTEMS:    All ROS are negative exept per HPI       Allergies    codeine (Other (Moderate))  Depakote (Unknown)  Dilaudid (Short breath; Rash)  IV Contrast (Anaphylaxis)  losartan (Angioedema)  Risperdal (Other)  verapamil (Short breath; Angioedema)    Intolerances          PHYSICAL EXAM  Vital Signs Last 24 Hrs  T(C): 36.4 (16 Dec 2022 08:28), Max: 36.8 (15 Dec 2022 23:53)  T(F): 97.6 (16 Dec 2022 08:28), Max: 98.3 (15 Dec 2022 23:53)  HR: 75 (16 Dec 2022 08:28) (75 - 97)  BP: 162/72 (16 Dec 2022 08:28) (137/65 - 162/72)  BP(mean): --  RR: 20 (15 Dec 2022 23:53) (20 - 26)  SpO2: 92% (16 Dec 2022 05:55) (92% - 100%)    Parameters below as of 16 Dec 2022 05:55  Patient On (Oxygen Delivery Method): mask, Venturi  O2 Flow (L/min): 15  O2 Concentration (%): 40    CONSTITUTIONAL:  In NAD    ENT:   Airway patent,     EYES:   Clear bilaterally,   pupils equal,   round and reactive to light.    CARDIAC:   Normal rate,   regular rhythm.    no edema    RESPIRATORY:   No wheezing   Normal chest expansion  Not tachypneic,  No use of accessory muscles    GASTROINTESTINAL:  Abdomen soft, non-tender,   No guarding,   Positive BS    MUSCULOSKELETAL:   No clubbing, cyanosis    NEUROLOGICAL:   Alert and oriented     SKIN:   Skin normal color for race,     LABS:                          10.2   7.48  )-----------( 229      ( 15 Dec 2022 22:06 )             32.8                                               12-15    138  |  103  |  32<H>  ----------------------------<  88  4.7   |  25  |  1.3    Ca    10.4      15 Dec 2022 22:06  Phos  4.0     12-15  Mg     1.9     12-15    TPro  8.2<H>  /  Alb  4.2  /  TBili  0.2  /  DBili  x   /  AST  54<H>  /  ALT  27  /  AlkPhos  359<H>  12-15      PT/INR - ( 15 Dec 2022 22:06 )   PT: 14.00 sec;   INR: 1.22 ratio         PTT - ( 15 Dec 2022 22:06 )  PTT:29.3 sec                                           CARDIAC MARKERS ( 15 Dec 2022 11:30 )  x     / <0.01 ng/mL / x     / x     / x                                                LIVER FUNCTIONS - ( 15 Dec 2022 11:30 )  Alb: 4.2 g/dL / Pro: 8.2 g/dL / ALK PHOS: 359 U/L / ALT: 27 U/L / AST: 54 U/L / GGT: x                                                                                            ABG - ( 16 Dec 2022 08:08 )  pH, Arterial: 7.40  pH, Blood: x     /  pCO2: 43    /  pO2: 69    / HCO3: 27    / Base Excess: 1.5   /  SaO2: 95.6        MEDICATIONS  (STANDING):  acetaminophen     Tablet .. 650 milliGRAM(s) Oral every 6 hours  albuterol/ipratropium for Nebulization 3 milliLiter(s) Nebulizer once  amLODIPine   Tablet 2.5 milliGRAM(s) Oral daily  atorvastatin 20 milliGRAM(s) Oral at bedtime  budesonide 160 MICROgram(s)/formoterol 4.5 MICROgram(s) Inhaler 2 Puff(s) Inhalation two times a day  chlorhexidine 2% Cloths 1 Application(s) Topical <User Schedule>  enoxaparin Injectable 40 milliGRAM(s) SubCutaneous every 24 hours  furosemide    Tablet 40 milliGRAM(s) Oral daily  gabapentin 400 milliGRAM(s) Oral three times a day  lamoTRIgine 100 milliGRAM(s) Oral two times a day  lidocaine   4% Patch 1 Patch Transdermal every 24 hours  loratadine 10 milliGRAM(s) Oral daily  metoprolol tartrate 75 milliGRAM(s) Oral two times a day  montelukast 10 milliGRAM(s) Oral daily  pantoprazole    Tablet 40 milliGRAM(s) Oral before breakfast  polyethylene glycol 3350 17 Gram(s) Oral daily  QUEtiapine 250 milliGRAM(s) Oral at bedtime  tiotropium 2.5 MICROgram(s) Inhaler 2 Puff(s) Inhalation daily    MEDICATIONS  (PRN):  albuterol    90 MICROgram(s) HFA Inhaler 2 Puff(s) Inhalation every 6 hours PRN Shortness of Breath and/or Wheezing  LORazepam     Tablet 1 milliGRAM(s) Oral two times a day PRN Anxiety  morphine  - Injectable 2 milliGRAM(s) IV Push every 4 hours PRN Severe Pain (7 - 10)

## 2022-12-16 NOTE — PROGRESS NOTE ADULT - ASSESSMENT
77F PMH COPD on 3-4 L nasal cannula at home, A. fib on Eliquis, pneumothorax x2, h/o pleurodesis on the right 15 years ago, CHF, BP1, Depression, HTN, FLAVIO on CPAP, emphysema, PVD, h/o COVID requiring intubation, carotid angioplasty, cholecystectomy, TIA x3, here for evaluation of atraumatic right upper back pain and shortness of breath that began this morning upon awakening around 6 AM.  Patient denies fever, chills, worsening cough, chest pain. Patient was transferred to [facility] from [facility] with complicated PTX. Physical exam findings, imaging, and labs as documented above.     PLAN:  - daily AM CXR  -monitor O2 requirements- current saturating 85-94 on venti mask (on 3L NC normally at home)   - pain control   -encourage Incentive spirometry   - can have diet

## 2022-12-16 NOTE — CONSULT NOTE ADULT - ASSESSMENT
IMPRESSION:    Acute on chronic hypoxemic respiratory failure (on 3-4L home O2)  Right sided tension pneumothorax secondary and spontaneous to severe emphysematous/bullous changes s/p pigtail - slightly improved   Chronic hypercapnic respiratory failure   FLAVIO on CPAP  HO severe COPD  HO pAfib on Eliquis  HO pneumothorax x2, s/p pleurodesis on the right 15 years ago  HO COVID requiring intubation    RECOMMENDATIONS:    Wean O2. Target POx 88%-92%  Duonebs @Q6 and PRN  CT management per CT surgery  NIV QHS and PRN  Pulmonary rehab as OP  Avoid volume overload  HOB at 45, aspiration precautions  DVT ppx  Ambulate as tolerates / PT / OT  Off loading and preventive measures to avoid pressure ulcers   IMPRESSION:    Acute on chronic hypoxemic respiratory failure on 15 L now (use 3-4L home O2)  Right sided tension pneumothorax secondary and spontaneous to severe emphysematous/bullous changes s/p pigtail - slightly improved   Chronic hypercapnic respiratory failure   FLAVIO on CPAP  HO severe COPD  Ho CHF  HO pAfib on Eliquis  HO pneumothorax x2, s/p pleurodesis on the right 15 years ago  HO COVID requiring intubation    RECOMMENDATIONS:    Supplemental O2.   Continue outpatient inhalers ICS/LABA/LAMA as the maintenance therapy. Duonebs as needed while in patient.  Better pain control   Incentive spirometry  Daily CXR.  CT management per CT surgery  Avoid volume overload  HOB at 45, aspiration precautions  DVT ppx. On Eliquis.  Ambulate early whenever she tolerates  Off loading and preventive measures to avoid pressure ulcers

## 2022-12-16 NOTE — PATIENT PROFILE ADULT - FALL HARM RISK - RISK INTERVENTIONS

## 2022-12-16 NOTE — PROGRESS NOTE ADULT - SUBJECTIVE AND OBJECTIVE BOX
THORACIC SURGERY PROGRESS NOTE    Patient: SHAUN COATES , 77y ([birth year])Female   MRN: [id number]  Location: [address]  Visit: 12-15-22 Inpatient  Date: 12-16-22 @ 02:58    Hospital Day #: 2  Post-Op Day #:    Procedure/Dx/Injuries: right large PTX s/p pigtail placement     Events of past 24 hours: pigtail to suction    PAST MEDICAL & SURGICAL HISTORY:  Hypertension      Depression      COPD (chronic obstructive pulmonary disease)      Other cardiomyopathy      Other emphysema      PVD (peripheral vascular disease)      Bipolar 1 disorder      FLAVIO on CPAP      Transient ischemic attack  x 3      Congestive heart failure      Falls      2019 novel coronavirus disease (COVID-19)      Respiratory failure requiring intubation      Afib      HLD (hyperlipidemia)      History of cholecystectomy      H/O carotid angioplasty          Vitals:   T(F): 98.3 (12-15-22 @ 23:53), Max: 98.3 (12-15-22 @ 23:53)  HR: 85 (12-15-22 @ 23:53)  BP: 137/65 (12-15-22 @ 23:53)  RR: 20 (12-15-22 @ 23:53)  SpO2: 97% (12-15-22 @ 23:53)      Diet, Regular      Fluids:     I & O's:    PHYSICAL EXAM:  General: NAD, AAOx3, calm and cooperative  HEENT: NCAT, MARY JANE, EOMI, Trachea ML, Neck supple  Cardiac: RRR S1, S2, no Murmurs, rubs or gallops  Respiratory: CTAB, normal respiratory effort, on venti mask without labored breathing, right side pigtail to suction no air leak   Abdomen: Soft, non-distended, non-tender, no rebound, no guarding. +BS.  Vascular: Pulses 2+ throughout, extremities well perfused  Skin: Warm/dry, normal color, no jaundice      MEDICATIONS  (STANDING):  acetaminophen     Tablet .. 650 milliGRAM(s) Oral every 6 hours  amLODIPine   Tablet 2.5 milliGRAM(s) Oral daily  atorvastatin 20 milliGRAM(s) Oral at bedtime  budesonide 160 MICROgram(s)/formoterol 4.5 MICROgram(s) Inhaler 2 Puff(s) Inhalation two times a day  chlorhexidine 2% Cloths 1 Application(s) Topical <User Schedule>  enoxaparin Injectable 40 milliGRAM(s) SubCutaneous every 24 hours  furosemide    Tablet 40 milliGRAM(s) Oral daily  gabapentin 400 milliGRAM(s) Oral two times a day  lamoTRIgine 100 milliGRAM(s) Oral two times a day  lidocaine   4% Patch 1 Patch Transdermal every 24 hours  loratadine 10 milliGRAM(s) Oral daily  methocarbamol 500 milliGRAM(s) Oral once  metoprolol tartrate 75 milliGRAM(s) Oral two times a day  montelukast 10 milliGRAM(s) Oral daily  pantoprazole    Tablet 40 milliGRAM(s) Oral before breakfast  polyethylene glycol 3350 17 Gram(s) Oral daily  QUEtiapine 250 milliGRAM(s) Oral at bedtime  tiotropium 2.5 MICROgram(s) Inhaler 2 Puff(s) Inhalation daily    MEDICATIONS  (PRN):  albuterol    90 MICROgram(s) HFA Inhaler 2 Puff(s) Inhalation every 6 hours PRN Shortness of Breath and/or Wheezing  LORazepam     Tablet 1 milliGRAM(s) Oral two times a day PRN Anxiety  morphine  - Injectable 2 milliGRAM(s) IV Push every 4 hours PRN Severe Pain (7 - 10)      DVT PROPHYLAXIS: enoxaparin Injectable 40 milliGRAM(s) SubCutaneous every 24 hours    GI PROPHYLAXIS: pantoprazole    Tablet 40 milliGRAM(s) Oral before breakfast    ANTICOAGULATION:   ANTIBIOTICS:            LAB/STUDIES:  Labs:  CAPILLARY BLOOD GLUCOSE                              10.2   7.48  )-----------( 229      ( 15 Dec 2022 22:06 )             32.8       Auto Neutrophil %: 70.7 % (12-15-22 @ 22:06)  Auto Immature Granulocyte %: 0.3 % (12-15-22 @ 22:06)  Auto Neutrophil %: 52.8 % (12-15-22 @ 11:30)  Auto Immature Granulocyte %: 0.3 % (12-15-22 @ 11:30)    12-15    138  |  103  |  32<H>  ----------------------------<  88  4.7   |  25  |  1.3      Calcium, Total Serum: 10.4 mg/dL (12-15-22 @ 22:06)      LFTs:             8.2  | 0.2  | 54       ------------------[359     ( 15 Dec 2022 11:30 )  4.2  | x    | 27          Lipase:x      Amylase:x             Coags:     14.00  ----< 1.22    ( 15 Dec 2022 22:06 )     29.3        CARDIAC MARKERS ( 15 Dec 2022 11:30 )  x     / <0.01 ng/mL / x     / x     / x          Serum Pro-Brain Natriuretic Peptide: 592 pg/mL (12-15-22 @ 11:30)      IMAGING:  < from: CT Chest No Cont (12.15.22 @ 14:53) >    Large right-sided tension pneumothorax with collapse of the right lung as   described. Shift of mediastinum to the left. Significant emphysematous   changes within the left lung.    Nonspecific mildly prominent lymph nodes. Atherosclerotic changes.      < end of copied text >  < from: Xray Chest 1 View-PORTABLE IMMEDIATE (Xray Chest 1 View-PORTABLE IMMEDIATE .) (12.15.22 @ 16:30) >  Moderate size right apical pneumothorax with some lung reexpansion.    Bilateral opacities.    < end of copied text >      ACCESS/ DEVICES:  [X ] Peripheral IV  [ ] Central Venous Line	[ ] R	[ ] L	[ ] IJ	[ ] Fem	[ ] SC	Placed:   [ ] Arterial Line		[ ] R	[ ] L	[ ] Fem	[ ] Rad	[ ] Ax	Placed:   [ ] PICC:					[ ] Mediport  [ ] Urinary Catheter,  Date Placed:   [X ] Chest tube: [X ] Right, [ ] Left  [ ] ISAIAS/Michele Drains

## 2022-12-17 NOTE — PROGRESS NOTE ADULT - ASSESSMENT
77F PMH COPD on 3-4 L nasal cannula at home, A. fib on Eliquis, pneumothorax x2, h/o pleurodesis on the right 15 years ago, CHF, BP1, Depression, HTN, FLAVIO on CPAP, emphysema, PVD, h/o COVID requiring intubation, carotid angioplasty, cholecystectomy, TIA x3, here for evaluation of atraumatic right upper back pain and shortness of breath that began this morning upon awakening around 6 AM.  Patient denies fever, chills, worsening cough, chest pain. Patient was transferred to [facility] from [facility] with complicated PTX.    PLAN:  - daily AM CXR  -monitor O2 requirements- remains on face mask   - pain control - regimen adjusted, consider pain management consult  -encourage Incentive spirometry   -pulm consulted, on all home inhalers  - can have diet

## 2022-12-17 NOTE — PROGRESS NOTE ADULT - ASSESSMENT
IMPRESSION:  severe COPD  recurrent R spont PTx  ongoing air leak    SUGGEST:  CT management per T surg  cont chest tube to suction  lung not fully expanded on AM films  bronchodilators  continue O2 as necessary to maintain sats > 90%<94  DVT/Gastritis prophylaxis  may need definitive surgical treatment with pleurodesis or VATS. The patient would be at a moderate to high pumonary risk for a surgical pleurodesis.

## 2022-12-17 NOTE — PROGRESS NOTE ADULT - SUBJECTIVE AND OBJECTIVE BOX
Patient is a 77y old  Female who presents with a chief complaint of Pneumonthorax (16 Dec 2022 14:40)        HPI:  GENERAL SURGERY CONSULT NOTE    Patient: SHAUN COATES , 77y ([birth year])Female   MRN: [id number]  Location: [address]  Visit: 12-15-22 Inpatient  Date: 12-15-22 @ 18:19    HPI:  77F PMH COPD on 3-4 L nasal cannula at home, A. fib on Eliquis, pneumothorax x2, h/o pleurodesis on the right 15 years ago, CHF, BP1, Depression, HTN, FLAVIO on CPAP, emphysema, PVD, h/o COVID requiring intubation, carotid angioplasty, cholecystectomy, TIA x3, here for evaluation of atraumatic right upper back pain and shortness of breath that began this morning upon awakening around 6 AM.  Patient denies fever, chills, worsening cough, chest pain. Patient was transferred to [facility] from [facility] with complicated PTX.    PAST MEDICAL & SURGICAL HISTORY:  Hypertension      Depression      COPD (chronic obstructive pulmonary disease)      Other cardiomyopathy      Other emphysema      PVD (peripheral vascular disease)      Bipolar 1 disorder      FLAVIO on CPAP      Transient ischemic attack  x 3      Congestive heart failure      Falls      2019 novel coronavirus disease (COVID-19)      Respiratory failure requiring intubation      Afib      HLD (hyperlipidemia)      History of cholecystectomy      H/O carotid angioplasty          Home Medications:  albuterol 90 mcg/inh inhalation aerosol with adapter: 2 puff(s) inhaled every 4 hours, As Needed (2022 11:49)  aspirin 81 mg oral tablet:  (2022 08:53)  atorvastatin 20 mg oral tablet: 1 tab(s) orally once a day (2022 08:53)  Breo Ellipta 200 mcg-25 mcg/inh inhalation powder: puff(s) inhaled once a day (2022 08:53)  Eliquis 5 mg oral tablet: 1 tab(s) orally 2 times a day (2022 08:53)  freetext medication     -: 1 puff(s) inhaled once a day (2022 11:44)  gabapentin 400 mg oral capsule: 1 cap(s) orally 2 times a day (2022 11:44)  Incruse Ellipta 62.5 mcg/inh inhalation powder: 1 puff(s) inhaled every 24 hours (2022 08:53)  lamoTRIgine 100 mg oral tablet: 1 tab(s) orally 2 times a day (2022 08:53)  lidocaine 4% topical film: Apply topically to affected area once a day (21 Oct 2022 11:57)  LORazepam 0.5 mg oral tablet: 1  orally 2 times a day, As Needed for agitation (2022 11:44)  metoprolol tartrate 75 mg oral tablet: 1 tab(s) orally 2 times a day (2022 08:53)  montelukast 10 mg oral tablet: 1 tab(s) orally once a day (2022 08:53)  pantoprazole 40 mg oral delayed release tablet: 1 tab(s) orally once a day (2022 08:53)  polyethylene glycol 3350 oral powder for reconstitution: 17 gram(s) orally once a day (2022 12:08)  QUEtiapine 200 mg oral tablet: 250 milligram(s) orally once a day (at bedtime) (2022 11:44)  Xyzal: 1 tab(s) orally once a day (2022 11:44)      MEDICATIONS  (STANDING):  enoxaparin Injectable 40 milliGRAM(s) SubCutaneous every 24 hours  lidocaine   4% Patch 1 Patch Transdermal every 24 hours    MEDICATIONS  (PRN):   (15 Dec 2022 18:19)      Pt evaluated on rounds.  I reviewed the radiology tests and hospital record.    I reviewed previous notes on this patient.    Interval Events: No overnight events.          REVIEW OF SYSTEMS:   see HPI      OBJECTIVE:  ICU Vital Signs Last 24 Hrs  T(C): 36.7 (17 Dec 2022 09:01), Max: 37.8 (16 Dec 2022 15:25)  T(F): 98 (17 Dec 2022 09:01), Max: 100 (16 Dec 2022 15:25)  HR: 90 (17 Dec 2022 09:01) (88 - 98)  BP: 122/59 (17 Dec 2022 09:01) (101/52 - 131/60)  BP(mean): --  ABP: --  ABP(mean): --  RR: 18 (17 Dec 2022 09:01) (16 - 22)  SpO2: 95% (17 Dec 2022 09:01) (82% - 95%)    O2 Parameters below as of 17 Dec 2022 09:01  Patient On (Oxygen Delivery Method): mask, nonrebreather               @ 07:01  -   @ 07:00  --------------------------------------------------------  IN: 240 mL / OUT: 1190 mL / NET: -950 mL      CAPILLARY BLOOD GLUCOSE            PHYSICAL EXAM:       · ENMT:   Airway patent,   Nasal mucosa clear.  Mouth with normal mucosa.   No thrush    · EYES:   Clear bilaterally,   pupils equal,   round and reactive to light.    · CARDIAC:   Normal rate,   regular rhythm.    Heart sounds S1, S2.   No murmurs, no rubs or gallops on auscultation  no edema        CAROTID:   normal systolic impulse  no bruits    · RESPIRATORY:   rales  normal chest expansion  no retractions or use of accessory muscles  percussion of chest demonstrates no hyperresonance or dullness    · GASTROINTESTINAL:  Abdomen soft,   non-tender,   + BS  liver/spleen not palpable    · MUSCULOSKELETAL:   no clubbing, cyanosis      · SKIN:   Skin normal color for race,   warm, dry   No evidence of rash.        · HEME LYMPH:   no splenomegaly.  No cervical  lymphadenopathy.  no inguinal lymphadenopathy    HOSPITAL MEDICATIONS:  MEDICATIONS  (STANDING):  acetaminophen     Tablet .. 650 milliGRAM(s) Oral every 6 hours  amLODIPine   Tablet 2.5 milliGRAM(s) Oral daily  apixaban 5 milliGRAM(s) Oral every 12 hours  aspirin  chewable 81 milliGRAM(s) Oral daily  atorvastatin 20 milliGRAM(s) Oral at bedtime  benzonatate 100 milliGRAM(s) Oral every 8 hours  budesonide 160 MICROgram(s)/formoterol 4.5 MICROgram(s) Inhaler 2 Puff(s) Inhalation two times a day  cefTRIAXone   IVPB 1000 milliGRAM(s) IV Intermittent every 24 hours  chlorhexidine 2% Cloths 1 Application(s) Topical <User Schedule>  fluticasone furoate/vilanterol 200-25 MICROgram(s) Inhaler 1 Puff(s) Inhalation daily  furosemide    Tablet 40 milliGRAM(s) Oral daily  gabapentin 400 milliGRAM(s) Oral three times a day  lamoTRIgine 100 milliGRAM(s) Oral two times a day  lidocaine   4% Patch 1 Patch Transdermal every 24 hours  loratadine 10 milliGRAM(s) Oral daily  methocarbamol 500 milliGRAM(s) Oral two times a day  metoprolol tartrate 75 milliGRAM(s) Oral two times a day  montelukast 10 milliGRAM(s) Oral daily  pantoprazole    Tablet 40 milliGRAM(s) Oral before breakfast  polyethylene glycol 3350 17 Gram(s) Oral daily  QUEtiapine 250 milliGRAM(s) Oral at bedtime  umeclidinium 62.5 MICROgram(s) Inhaler 1 Puff(s) Inhalation daily    MEDICATIONS  (PRN):  albuterol    90 MICROgram(s) HFA Inhaler 2 Puff(s) Inhalation every 6 hours PRN Shortness of Breath and/or Wheezing  albuterol/ipratropium for Nebulization 3 milliLiter(s) Nebulizer every 6 hours PRN Shortness of Breath and/or Wheezing  LORazepam     Tablet 1 milliGRAM(s) Oral two times a day PRN Anxiety  morphine  - Injectable 2 milliGRAM(s) IV Push every 4 hours PRN Severe Pain (7 - 10)        LABS:                        10.6   7.83  )-----------( 263      ( 16 Dec 2022 21:27 )             33.7     12-16    135  |  99  |  28<H>  ----------------------------<  133<H>  4.1   |  26  |  1.2    Ca    9.2      16 Dec 2022 21:27  Phos  3.0     12-16  Mg     1.7     12-16    TPro  8.2<H>  /  Alb  4.2  /  TBili  0.2  /  DBili  x   /  AST  54<H>  /  ALT  27  /  AlkPhos  359<H>  12-15    PT/INR - ( 15 Dec 2022 22:06 )   PT: 14.00 sec;   INR: 1.22 ratio         PTT - ( 15 Dec 2022 22:06 )  PTT:29.3 sec  Urinalysis Basic - ( 16 Dec 2022 20:10 )    Color: Yellow / Appearance: Clear / S.018 / pH: x  Gluc: x / Ketone: Negative  / Bili: Negative / Urobili: <2 mg/dL   Blood: x / Protein: Trace / Nitrite: Positive   Leuk Esterase: Large / RBC: 1 /HPF / WBC 8 /HPF   Sq Epi: x / Non Sq Epi: 1 /HPF / Bacteria: Many      Arterial Blood Gas:   @ 05:29  7.35/53/56/29/85.9/2.5  ABG lactate: --  Arterial Blood Gas:   @ 08:08  7.40/43/69/27/95.6/1.5  ABG lactate: --        CARDIAC MARKERS ( 15 Dec 2022 11:30 )  x     / <0.01 ng/mL / x     / x     / x          SARS-CoV-2: NotDetec (18 Oct 2022 11:55)  COVID-19 PCR: NotDetec (18 Oct 2022 07:17)        ABG - ( 17 Dec 2022 05:29 )  pH, Arterial: 7.35  pH, Blood: x     /  pCO2: 53    /  pO2: 56    / HCO3: 29    / Base Excess: 2.5   /  SaO2: 85.9                RADIOLOGY: Today I personally interpreted the latest CXR and other pertinent films.

## 2022-12-17 NOTE — CHART NOTE - NSCHARTNOTEFT_GEN_A_CORE
MICU Transfer Note    SHAUN COATES  77y (1945)  [id number]      Transfer from: Surgery  Transfer to: MICU      SICU COURSE:  Female 77F PMH COPD on 3-4 L nasal cannula at home, A. fib on Eliquis, pneumothorax x2, h/o pleurodesis on the right 15 years ago, CHF, BP1, Depression, HTN, FLAVIO on CPAP, emphysema, PVD, h/o COVID requiring intubation, carotid angioplasty, cholecystectomy, TIA x3, here for evaluation of atraumatic right upper back pain and shortness of breath that began this morning upon awakening around 6 AM.  Patient denies fever, chills, worsening cough, chest pain. [facility] [place] [facility]. Patient is now s/p pigtail. Patient with an ongoing air leak, severe COPD. Medicine and MICU consulted for transfer of care for increasing oxygen requirements and SOB with pain.    PAST MEDICAL & SURGICAL HISTORY:  Hypertension      Depression      COPD (chronic obstructive pulmonary disease)      Other cardiomyopathy      Other emphysema      PVD (peripheral vascular disease)      Bipolar 1 disorder      FLAVIO on CPAP      Transient ischemic attack  x 3      Congestive heart failure      Falls      2019 novel coronavirus disease (COVID-19)      Respiratory failure requiring intubation      Afib      HLD (hyperlipidemia)      History of cholecystectomy      H/O carotid angioplasty        Allergies    codeine (Other (Moderate))  Depakote (Unknown)  Dilaudid (Short breath; Rash)  IV Contrast (Anaphylaxis)  losartan (Angioedema)  Risperdal (Other)  verapamil (Short breath; Angioedema)    Intolerances      MEDICATIONS  (STANDING):  acetaminophen     Tablet .. 650 milliGRAM(s) Oral every 6 hours  amLODIPine   Tablet 2.5 milliGRAM(s) Oral daily  apixaban 5 milliGRAM(s) Oral every 12 hours  aspirin  chewable 81 milliGRAM(s) Oral daily  atorvastatin 20 milliGRAM(s) Oral at bedtime  benzonatate 100 milliGRAM(s) Oral every 8 hours  budesonide 160 MICROgram(s)/formoterol 4.5 MICROgram(s) Inhaler 2 Puff(s) Inhalation two times a day  cefTRIAXone   IVPB 1000 milliGRAM(s) IV Intermittent every 24 hours  chlorhexidine 2% Cloths 1 Application(s) Topical <User Schedule>  fluticasone furoate/vilanterol 200-25 MICROgram(s) Inhaler 1 Puff(s) Inhalation daily  furosemide    Tablet 40 milliGRAM(s) Oral daily  gabapentin 400 milliGRAM(s) Oral three times a day  lamoTRIgine 100 milliGRAM(s) Oral two times a day  lidocaine   4% Patch 1 Patch Transdermal every 24 hours  loratadine 10 milliGRAM(s) Oral daily  methocarbamol 500 milliGRAM(s) Oral two times a day  metoprolol tartrate 75 milliGRAM(s) Oral two times a day  montelukast 10 milliGRAM(s) Oral daily  pantoprazole    Tablet 40 milliGRAM(s) Oral before breakfast  polyethylene glycol 3350 17 Gram(s) Oral daily  QUEtiapine 250 milliGRAM(s) Oral at bedtime  umeclidinium 62.5 MICROgram(s) Inhaler 1 Puff(s) Inhalation daily    MEDICATIONS  (PRN):  albuterol    90 MICROgram(s) HFA Inhaler 2 Puff(s) Inhalation every 6 hours PRN Shortness of Breath and/or Wheezing  albuterol/ipratropium for Nebulization 3 milliLiter(s) Nebulizer every 6 hours PRN Shortness of Breath and/or Wheezing  LORazepam     Tablet 1 milliGRAM(s) Oral two times a day PRN Anxiety  morphine  - Injectable 2 milliGRAM(s) IV Push every 4 hours PRN Severe Pain (7 - 10)      Vital Signs Last 24 Hrs  T(C): 36.7 (17 Dec 2022 13:00), Max: 36.8 (17 Dec 2022 01:28)  T(F): 98 (17 Dec 2022 13:00), Max: 98.3 (17 Dec 2022 01:28)  HR: 77 (17 Dec 2022 13:00) (77 - 98)  BP: 118/60 (17 Dec 2022 13:00) (101/52 - 131/60)  BP(mean): --  RR: 18 (17 Dec 2022 13:00) (16 - 22)  SpO2: 96% (17 Dec 2022 13:00) (82% - 96%)    Parameters below as of 17 Dec 2022 13:00  Patient On (Oxygen Delivery Method): nasal cannula, high flow  O2 Flow (L/min): 60  O2 Concentration (%): 100  I&O's Summary    16 Dec 2022 07:01  -  17 Dec 2022 07:00  --------------------------------------------------------  IN: 240 mL / OUT: 1190 mL / NET: -950 mL        LABS  LABS:                        10.6   7.83  )-----------( 263      ( 16 Dec 2022 21:27 )             33.7       12-    135  |  99  |  28<H>  ----------------------------<  133<H>  4.1   |  26  |  1.2    Ca    9.2      16 Dec 2022 21:27  Phos  3.0     12-  Mg     1.7     12-        PT/INR - ( 15 Dec 2022 22:06 )   PT: 14.00 sec;   INR: 1.22 ratio         PTT - ( 15 Dec 2022 22:06 )  PTT:29.3 sec      bnp    Urinalysis Basic - ( 16 Dec 2022 20:10 )    Color: Yellow / Appearance: Clear / S.018 / pH: x  Gluc: x / Ketone: Negative  / Bili: Negative / Urobili: <2 mg/dL   Blood: x / Protein: Trace / Nitrite: Positive   Leuk Esterase: Large / RBC: 1 /HPF / WBC 8 /HPF   Sq Epi: x / Non Sq Epi: 1 /HPF / Bacteria: Many          Assessment & Plan:          Follow Up:  -2330 labs  -pigtail output  -O2 requirements  -COPD exacerbation        Signed out to: Heide Oliveira  Date: 22  Time: 1:00 PM

## 2022-12-17 NOTE — PROGRESS NOTE ADULT - SUBJECTIVE AND OBJECTIVE BOX
THORACIC SURGERY PROGRESS NOTE    Patient: SHAUN COATES , 77y ([birth year])Female   MRN: [id number]  Location: [address]  Visit: 12-15-22 Inpatient  Date: 22 @ 01:18    Hospital Day #: 3  Post-Op Day #:    Procedure/Dx/Injuries: s/p right side pigtail for pneumothorax    Events of past 24 hours: pulm consulted, O2 requirements remain high, pain not controlled    PAST MEDICAL & SURGICAL HISTORY:  Hypertension      Depression      COPD (chronic obstructive pulmonary disease)      Other cardiomyopathy      Other emphysema      PVD (peripheral vascular disease)      Bipolar 1 disorder      FLAVIO on CPAP      Transient ischemic attack  x 3      Congestive heart failure      Falls      2019 novel coronavirus disease (COVID-19)      Respiratory failure requiring intubation      Afib      HLD (hyperlipidemia)      History of cholecystectomy      H/O carotid angioplasty          Vitals:   T(F): 100 (22 @ 15:25), Max: 100 (22 @ 15:25)  HR: 88 (22 @ 15:25)  BP: 115/71 (22 @ 15:25)  RR: --  SpO2: 89% (22 @ 15:25)      Diet, Regular      Fluids:     I & O's:    12-15-22 @ 07:01  -  22 @ 07:00  --------------------------------------------------------  IN:    Oral Fluid: 240 mL  Total IN: 240 mL    OUT:    Voided (mL): 150 mL  Total OUT: 150 mL    Total NET: 90 mL      PHYSICAL EXAM:  General: NAD, AAOx3, calm and cooperative  HEENT: NCAT, MARY JANE, EOMI, Trachea ML, Neck supple  Cardiac: RRR S1, S2, no Murmurs, rubs or gallops  Respiratory: CTAB some wheezing, pain on deep inspiration, no labored breathing or accessory muscle use, no stridor  Abdomen: Soft, non-distended, non-tender,  Vascular: Pulses 2+ throughout, extremities well perfused  Skin: Warm/dry, normal color, no jaundice      MEDICATIONS  (STANDING):  acetaminophen     Tablet .. 650 milliGRAM(s) Oral every 6 hours  albuterol/ipratropium for Nebulization 3 milliLiter(s) Nebulizer once  amLODIPine   Tablet 2.5 milliGRAM(s) Oral daily  apixaban 5 milliGRAM(s) Oral every 12 hours  aspirin  chewable 81 milliGRAM(s) Oral daily  atorvastatin 20 milliGRAM(s) Oral at bedtime  budesonide 160 MICROgram(s)/formoterol 4.5 MICROgram(s) Inhaler 2 Puff(s) Inhalation two times a day  chlorhexidine 2% Cloths 1 Application(s) Topical <User Schedule>  fluticasone furoate/vilanterol 200-25 MICROgram(s) Inhaler 1 Puff(s) Inhalation daily  furosemide    Tablet 40 milliGRAM(s) Oral daily  gabapentin 400 milliGRAM(s) Oral three times a day  lamoTRIgine 100 milliGRAM(s) Oral two times a day  lidocaine   4% Patch 1 Patch Transdermal every 24 hours  loratadine 10 milliGRAM(s) Oral daily  methocarbamol 500 milliGRAM(s) Oral two times a day  metoprolol tartrate 75 milliGRAM(s) Oral two times a day  montelukast 10 milliGRAM(s) Oral daily  pantoprazole    Tablet 40 milliGRAM(s) Oral before breakfast  polyethylene glycol 3350 17 Gram(s) Oral daily  QUEtiapine 250 milliGRAM(s) Oral at bedtime  umeclidinium 62.5 MICROgram(s) Inhaler 1 Puff(s) Inhalation daily    MEDICATIONS  (PRN):  albuterol    90 MICROgram(s) HFA Inhaler 2 Puff(s) Inhalation every 6 hours PRN Shortness of Breath and/or Wheezing  LORazepam     Tablet 1 milliGRAM(s) Oral two times a day PRN Anxiety  morphine  - Injectable 2 milliGRAM(s) IV Push every 4 hours PRN Severe Pain (7 - 10)      DVT PROPHYLAXIS:   GI PROPHYLAXIS: pantoprazole    Tablet 40 milliGRAM(s) Oral before breakfast    ANTICOAGULATION:   ANTIBIOTICS:            LAB/STUDIES:  Labs:  CAPILLARY BLOOD GLUCOSE                              10.6   7.83  )-----------( 263      ( 16 Dec 2022 21:27 )             33.7       Auto Neutrophil %: 68.7 % (22 @ 21:27)  Auto Immature Granulocyte %: 0.3 % (22 @ 21:27)        135  |  99  |  28<H>  ----------------------------<  133<H>  4.1   |  26  |  1.2      Calcium, Total Serum: 9.2 mg/dL (22 @ 21:27)      LFTs:             8.2  | 0.2  | 54       ------------------[359     ( 15 Dec 2022 11:30 )  4.2  | x    | 27          Lipase:x      Amylase:x         Blood Gas Arterial, Lactate: 1.10 mmol/L (22 @ 08:08)    ABG - ( 16 Dec 2022 08:08 )  pH: 7.40  /  pCO2: 43    /  pO2: 69    / HCO3: 27    / Base Excess: 1.5   /  SaO2: 95.6              Coags:     14.00  ----< 1.22    ( 15 Dec 2022 22:06 )     29.3        CARDIAC MARKERS ( 15 Dec 2022 11:30 )  x     / <0.01 ng/mL / x     / x     / x          Serum Pro-Brain Natriuretic Peptide: 592 pg/mL (12-15-22 @ 11:30)      Urinalysis Basic - ( 16 Dec 2022 20:10 )    Color: Yellow / Appearance: Clear / S.018 / pH: x  Gluc: x / Ketone: Negative  / Bili: Negative / Urobili: <2 mg/dL   Blood: x / Protein: Trace / Nitrite: Positive   Leuk Esterase: Large / RBC: 1 /HPF / WBC 8 /HPF   Sq Epi: x / Non Sq Epi: 1 /HPF / Bacteria: Many        IMAGING:  < from: CT Chest No Cont (12.15.22 @ 14:53) >  Large right-sided tension pneumothorax with collapse of the right lung as   described. Shift of mediastinum to the left. Significant emphysematous   changes within the left lung.    Nonspecific mildly prominent lymph nodes. Atherosclerotic changes.      < end of copied text >      ACCESS/ DEVICES:  [X ] Peripheral IV  [ ] Central Venous Line	[ ] R	[ ] L	[ ] IJ	[ ] Fem	[ ] SC	Placed:   [ ] Arterial Line		[ ] R	[ ] L	[ ] Fem	[ ] Rad	[ ] Ax	Placed:   [ ] PICC:					[ ] Mediport  [ ] Urinary Catheter,  Date Placed:   [X ] Chest tube: [X ] Right, [ ] Left  [ ] ISAIAS/Michele Drains

## 2022-12-17 NOTE — CHART NOTE - NSCHARTNOTEFT_GEN_A_CORE
Called by RN that patient O2 sat was 78% on 40% venti mask   Patient has been complaining of pain around pigtail site and states she is unable to take a deep breath due to pain.   Pain regimen was upgraded. Robaxin added.     I examined patient at bedside. No respiratory distress Saturating 80-82%.   Pigtail still to sxn +airleak   ordered urgent CXR and respiratory called for duoneb and increase to FiO2 to 50%.     Will closely monitor.

## 2022-12-18 NOTE — CHART NOTE - NSCHARTNOTEFT_GEN_A_CORE
Patient was spiking fever. UA was positive. currently on ceft. procal, d-dimer, rvp sent. broadened abx with one time dose of vanc. Patient was spiking fever. UA was positive. currently on ceft. cultures pending in lab. procal, d-dimer, rvp ordered. broadened abx with one time dose of vanc.

## 2022-12-18 NOTE — PROGRESS NOTE ADULT - SUBJECTIVE AND OBJECTIVE BOX
GENERAL SURGERY PROGRESS NOTE    Patient: SHAUN COATES , 77y ([birth year])Female   MRN: [id number]  Location: [address]  Visit: 12-15-22 Inpatient  Date: 22 @ 06:25    Events of past 24 hours:  Patient was transferred to Medicine service  Patient pigtail to suction, with airleak  Patient continues to feel lethargic however satting well on HFNC    PAST MEDICAL & SURGICAL HISTORY:  Hypertension      Depression      COPD (chronic obstructive pulmonary disease)      Other cardiomyopathy      Other emphysema      PVD (peripheral vascular disease)      Bipolar 1 disorder      FLAVIO on CPAP      Transient ischemic attack  x 3      Congestive heart failure      Falls      2019 novel coronavirus disease (COVID-19)      Respiratory failure requiring intubation      Afib      HLD (hyperlipidemia)      History of cholecystectomy      H/O carotid angioplasty          Vitals:   T(F): 97.6 (22 @ 00:00), Max: 98.6 (22 @ 16:00)  HR: 94 (22 @ 06:05)  BP: 126/66 (22 @ 06:05)  RR: 20 (22 @ 00:29)  SpO2: 99% (22 @ 06:05)      Diet, Regular      Fluids:     I & O's:    22 @ 07:01  -  22 @ 07:00  --------------------------------------------------------  IN:    Oral Fluid: 240 mL  Total IN: 240 mL    OUT:    Chest Tube (mL): 40 mL    Voided (mL): 1150 mL  Total OUT: 1190 mL    Total NET: -950 mL    PHYSICAL EXAM:  General: NAD, AAOx3, calm and cooperative  HEENT: NCAT, MARY JANE, EOMI, Trachea ML, Neck supple  Cardiac: RRR S1, S2, no Murmurs, rubs or gallops  Respiratory: CTAB some wheezing, pain on deep inspiration, no labored breathing or accessory muscle use, no stridor  Abdomen: Soft, non-distended, non-tender,  Vascular: Pulses 2+ throughout, extremities well perfused  Skin: Warm/dry, normal color, no jaundice    MEDICATIONS  (STANDING):  acetaminophen     Tablet .. 650 milliGRAM(s) Oral every 6 hours  amLODIPine   Tablet 2.5 milliGRAM(s) Oral daily  apixaban 5 milliGRAM(s) Oral every 12 hours  aspirin  chewable 81 milliGRAM(s) Oral daily  atorvastatin 20 milliGRAM(s) Oral at bedtime  benzonatate 100 milliGRAM(s) Oral every 8 hours  budesonide 160 MICROgram(s)/formoterol 4.5 MICROgram(s) Inhaler 2 Puff(s) Inhalation two times a day  cefTRIAXone   IVPB 1000 milliGRAM(s) IV Intermittent every 24 hours  chlorhexidine 2% Cloths 1 Application(s) Topical <User Schedule>  fluticasone furoate/vilanterol 200-25 MICROgram(s) Inhaler 1 Puff(s) Inhalation daily  furosemide    Tablet 40 milliGRAM(s) Oral daily  gabapentin 400 milliGRAM(s) Oral three times a day  lamoTRIgine 100 milliGRAM(s) Oral two times a day  lidocaine   4% Patch 1 Patch Transdermal every 24 hours  loratadine 10 milliGRAM(s) Oral daily  methocarbamol 500 milliGRAM(s) Oral two times a day  metoprolol tartrate 75 milliGRAM(s) Oral two times a day  montelukast 10 milliGRAM(s) Oral daily  pantoprazole    Tablet 40 milliGRAM(s) Oral before breakfast  polyethylene glycol 3350 17 Gram(s) Oral daily  QUEtiapine 250 milliGRAM(s) Oral at bedtime  umeclidinium 62.5 MICROgram(s) Inhaler 1 Puff(s) Inhalation daily    MEDICATIONS  (PRN):  albuterol    90 MICROgram(s) HFA Inhaler 2 Puff(s) Inhalation every 6 hours PRN Shortness of Breath and/or Wheezing  albuterol/ipratropium for Nebulization 3 milliLiter(s) Nebulizer every 6 hours PRN Shortness of Breath and/or Wheezing  LORazepam     Tablet 1 milliGRAM(s) Oral two times a day PRN Anxiety  morphine  - Injectable 2 milliGRAM(s) IV Push every 4 hours PRN Severe Pain (7 - 10)      DVT PROPHYLAXIS:   GI PROPHYLAXIS: pantoprazole    Tablet 40 milliGRAM(s) Oral before breakfast    ANTICOAGULATION:   ANTIBIOTICS:  cefTRIAXone   IVPB 1000 milliGRAM(s)            LAB/STUDIES:  Labs:  CAPILLARY BLOOD GLUCOSE                              9.6    8.76  )-----------( 238      ( 17 Dec 2022 22:10 )             30.5       Auto Neutrophil %: 61.9 % (22 @ 22:10)  Auto Immature Granulocyte %: 0.2 % (22 @ 22:10)        129<L>  |  96<L>  |  40<H>  ----------------------------<  115<H>  3.8   |  25  |  1.8<H>      Calcium, Total Serum: 8.5 mg/dL (22 @ 22:10)      LFTs:     Blood Gas Arterial, Lactate: 0.90 mmol/L (22 @ 05:29)  Blood Gas Arterial, Lactate: 1.10 mmol/L (22 @ 08:08)    ABG - ( 17 Dec 2022 05:29 )  pH: 7.35  /  pCO2: 53    /  pO2: 56    / HCO3: 29    / Base Excess: 2.5   /  SaO2: 85.9            ABG - ( 16 Dec 2022 08:08 )  pH: 7.40  /  pCO2: 43    /  pO2: 69    / HCO3: 27    / Base Excess: 1.5   /  SaO2: 95.6              Coags:        Serum Pro-Brain Natriuretic Peptide: 592 pg/mL (12-15-22 @ 11:30)      Urinalysis Basic - ( 16 Dec 2022 20:10 )    Color: Yellow / Appearance: Clear / S.018 / pH: x  Gluc: x / Ketone: Negative  / Bili: Negative / Urobili: <2 mg/dL   Blood: x / Protein: Trace / Nitrite: Positive   Leuk Esterase: Large / RBC: 1 /HPF / WBC 8 /HPF   Sq Epi: x / Non Sq Epi: 1 /HPF / Bacteria: Many

## 2022-12-18 NOTE — PROGRESS NOTE ADULT - ASSESSMENT
IMPRESSION:  severe COPD  recurrent R spont PTx  ongoing large air leak    SUGGEST:  CT management per T surg   make sure suction level is at 20CM/H2O (H20 easily evaporates)   cont chest tube to suction and make sure it is bubbling correctly (I spoke to nursing this AM)   AM CXR pending  order AM films daily but also PRN for change in status ( ie. increased O2 needs or dyspnea)  no H20 seal trials with this large air leak  bronchodilators  continue O2 as necessary to maintain sats > 90%<94  DVT/Gastritis prophylaxis  may need definitive surgical treatment with pleurodesis or VATS. The patient would be at a moderate to high pulmonary risk for a surgical pleurodesis.  hold DOAC and add lovenox in case needs larger CT or VATS  agree monitor in MICU   IMPRESSION:  severe COPD  recurrent R spont PTx  ongoing large air leak    SUGGEST:  CT management per T surg   make sure suction level is at 20CM/H2O (H20 easily evaporates)   cont chest tube to suction and make sure it is bubbling correctly (I spoke to nursing this AM)   AM CXR pending  order AM films daily but also PRN for change in status ( ie. increased O2 needs or dyspnea)  no H20 seal trials with this large air leak  bronchodilators  continue O2 as necessary to maintain sats > 90%<94  DVT/Gastritis prophylaxis  may need definitive surgical treatment with pleurodesis or VATS. The patient would be at a moderate to high pulmonary risk for a surgical pleurodesis.  hold DOAC and add lovenox in case needs larger CT or VATS  agree monitor in MICU    communicated with T surg resident

## 2022-12-18 NOTE — PROGRESS NOTE ADULT - SUBJECTIVE AND OBJECTIVE BOX
Patient is a 77y old  Female who presents with a chief complaint of Pneumonthorax (16 Dec 2022 14:40)        HPI:  GENERAL SURGERY CONSULT NOTE    Patient: SHAUN COATES , 77y ([birth year])Female   MRN: [id number]  Location: [address]  Visit: 12-15-22 Inpatient  Date: 12-15-22 @ 18:19    HPI:  77F PMH COPD on 3-4 L nasal cannula at home, A. fib on Eliquis, pneumothorax x2, h/o pleurodesis on the right 15 years ago, CHF, BP1, Depression, HTN, FLAVIO on CPAP, emphysema, PVD, h/o COVID requiring intubation, carotid angioplasty, cholecystectomy, TIA x3, here for evaluation of atraumatic right upper back pain and shortness of breath that began this morning upon awakening around 6 AM.  Patient denies fever, chills, worsening cough, chest pain. Patient was transferred to [facility] from [facility] with complicated PTX.    PAST MEDICAL & SURGICAL HISTORY:  Hypertension      Depression      COPD (chronic obstructive pulmonary disease)      Other cardiomyopathy      Other emphysema      PVD (peripheral vascular disease)      Bipolar 1 disorder      FLAVIO on CPAP      Transient ischemic attack  x 3      Congestive heart failure      Falls      2019 novel coronavirus disease (COVID-19)      Respiratory failure requiring intubation      Afib      HLD (hyperlipidemia)      History of cholecystectomy      H/O carotid angioplasty          Home Medications:  albuterol 90 mcg/inh inhalation aerosol with adapter: 2 puff(s) inhaled every 4 hours, As Needed (2022 11:49)  aspirin 81 mg oral tablet:  (2022 08:53)  atorvastatin 20 mg oral tablet: 1 tab(s) orally once a day (2022 08:53)  Breo Ellipta 200 mcg-25 mcg/inh inhalation powder: puff(s) inhaled once a day (2022 08:53)  Eliquis 5 mg oral tablet: 1 tab(s) orally 2 times a day (2022 08:53)  freetext medication     -: 1 puff(s) inhaled once a day (2022 11:44)  gabapentin 400 mg oral capsule: 1 cap(s) orally 2 times a day (2022 11:44)  Incruse Ellipta 62.5 mcg/inh inhalation powder: 1 puff(s) inhaled every 24 hours (2022 08:53)  lamoTRIgine 100 mg oral tablet: 1 tab(s) orally 2 times a day (2022 08:53)  lidocaine 4% topical film: Apply topically to affected area once a day (21 Oct 2022 11:57)  LORazepam 0.5 mg oral tablet: 1  orally 2 times a day, As Needed for agitation (2022 11:44)  metoprolol tartrate 75 mg oral tablet: 1 tab(s) orally 2 times a day (2022 08:53)  montelukast 10 mg oral tablet: 1 tab(s) orally once a day (2022 08:53)  pantoprazole 40 mg oral delayed release tablet: 1 tab(s) orally once a day (2022 08:53)  polyethylene glycol 3350 oral powder for reconstitution: 17 gram(s) orally once a day (2022 12:08)  QUEtiapine 200 mg oral tablet: 250 milligram(s) orally once a day (at bedtime) (2022 11:44)  Xyzal: 1 tab(s) orally once a day (2022 11:44)      MEDICATIONS  (STANDING):  enoxaparin Injectable 40 milliGRAM(s) SubCutaneous every 24 hours  lidocaine   4% Patch 1 Patch Transdermal every 24 hours    MEDICATIONS  (PRN):   (15 Dec 2022 18:19)      Pt evaluated on rounds.  I reviewed the radiology tests and hospital record.    I reviewed previous notes on this patient.    Interval Events: No overnight events.        REVIEW OF SYSTEMS:   see HPI      OBJECTIVE:  ICU Vital Signs Last 24 Hrs  T(C): 36.4 (18 Dec 2022 00:00), Max: 37 (17 Dec 2022 16:00)  T(F): 97.6 (18 Dec 2022 00:00), Max: 98.6 (17 Dec 2022 16:00)  HR: 94 (18 Dec 2022 06:05) (69 - 98)  BP: 126/66 (18 Dec 2022 06:05) (115/58 - 127/59)  BP(mean): --  ABP: --  ABP(mean): --  RR: 20 (18 Dec 2022 00:29) (18 - 20)  SpO2: 99% (18 Dec 2022 06:05) (95% - 99%)    O2 Parameters below as of 18 Dec 2022 06:05  Patient On (Oxygen Delivery Method): hi flow  O2 Flow (L/min): 60  O2 Concentration (%): 85          12-17 @ 07:01  -  12-18 @ 07:00  --------------------------------------------------------  IN: 0 mL / OUT: 84 mL / NET: -84 mL      CAPILLARY BLOOD GLUCOSE            PHYSICAL EXAM:   Neuro awake and conversant also recognizes me    · ENMT:   Airway patent,   Nasal mucosa clear.  Mouth with normal mucosa.   No thrush    · EYES:   Clear bilaterally,   pupils equal,   round and reactive to light.    · CARDIAC:   Normal rate,   regular rhythm.    Heart sounds S1, S2.   No murmurs, no rubs or gallops on auscultation  no edema        CAROTID:   normal systolic impulse  no bruits    · RESPIRATORY:   rales  normal chest expansion  decreased BS  no retractions or use of accessory muscles  percussion of chest demonstrates no hyperresonance or dullness    · GASTROINTESTINAL:  Abdomen soft,   non-tender,   + BS  liver/spleen not palpable    · MUSCULOSKELETAL:   no clubbing, cyanosis      · SKIN:   Skin normal color for race,   warm, dry   No evidence of rash.        · HEME LYMPH:   no splenomegaly.  No cervical  lymphadenopathy.  no inguinal lymphadenopathy    HOSPITAL MEDICATIONS:  MEDICATIONS  (STANDING):  acetaminophen     Tablet .. 650 milliGRAM(s) Oral every 6 hours  amLODIPine   Tablet 2.5 milliGRAM(s) Oral daily  apixaban 5 milliGRAM(s) Oral every 12 hours  aspirin  chewable 81 milliGRAM(s) Oral daily  atorvastatin 20 milliGRAM(s) Oral at bedtime  benzonatate 100 milliGRAM(s) Oral every 8 hours  budesonide 160 MICROgram(s)/formoterol 4.5 MICROgram(s) Inhaler 2 Puff(s) Inhalation two times a day  cefTRIAXone   IVPB 1000 milliGRAM(s) IV Intermittent every 24 hours  chlorhexidine 2% Cloths 1 Application(s) Topical <User Schedule>  fluticasone furoate/vilanterol 200-25 MICROgram(s) Inhaler 1 Puff(s) Inhalation daily  furosemide    Tablet 40 milliGRAM(s) Oral daily  gabapentin 400 milliGRAM(s) Oral three times a day  lamoTRIgine 100 milliGRAM(s) Oral two times a day  lidocaine   4% Patch 1 Patch Transdermal every 24 hours  loratadine 10 milliGRAM(s) Oral daily  methocarbamol 500 milliGRAM(s) Oral two times a day  metoprolol tartrate 75 milliGRAM(s) Oral two times a day  montelukast 10 milliGRAM(s) Oral daily  pantoprazole    Tablet 40 milliGRAM(s) Oral before breakfast  polyethylene glycol 3350 17 Gram(s) Oral daily  QUEtiapine 250 milliGRAM(s) Oral at bedtime  umeclidinium 62.5 MICROgram(s) Inhaler 1 Puff(s) Inhalation daily    MEDICATIONS  (PRN):  albuterol    90 MICROgram(s) HFA Inhaler 2 Puff(s) Inhalation every 6 hours PRN Shortness of Breath and/or Wheezing  albuterol/ipratropium for Nebulization 3 milliLiter(s) Nebulizer every 6 hours PRN Shortness of Breath and/or Wheezing  LORazepam     Tablet 1 milliGRAM(s) Oral two times a day PRN Anxiety  morphine  - Injectable 2 milliGRAM(s) IV Push every 4 hours PRN Severe Pain (7 - 10)        LABS:                        9.6    8.76  )-----------( 238      ( 17 Dec 2022 22:10 )             30.5         129<L>  |  96<L>  |  40<H>  ----------------------------<  115<H>  3.8   |  25  |  1.8<H>    Ca    8.5      17 Dec 2022 22:10  Phos  2.6       Mg     2.3             Urinalysis Basic - ( 16 Dec 2022 20:10 )    Color: Yellow / Appearance: Clear / S.018 / pH: x  Gluc: x / Ketone: Negative  / Bili: Negative / Urobili: <2 mg/dL   Blood: x / Protein: Trace / Nitrite: Positive   Leuk Esterase: Large / RBC: 1 /HPF / WBC 8 /HPF   Sq Epi: x / Non Sq Epi: 1 /HPF / Bacteria: Many      Arterial Blood Gas:   @ 05:29  7.35/53/56/29/85.9/2.5  ABG lactate: --            SARS-CoV-2: NotDetec (18 Oct 2022 11:55)  COVID-19 PCR: NotDetec (18 Oct 2022 07:17)        ABG - ( 17 Dec 2022 05:29 )  pH, Arterial: 7.35  pH, Blood: x     /  pCO2: 53    /  pO2: 56    / HCO3: 29    / Base Excess: 2.5   /  SaO2: 85.9                RADIOLOGY: Today I personally interpreted the latest CXR and other pertinent films.

## 2022-12-18 NOTE — PROGRESS NOTE ADULT - ASSESSMENT
77F PMH COPD on 3-4 L nasal cannula at home, A. fib on Eliquis, pneumothorax x2, h/o pleurodesis on the right 15 years ago, CHF, BP1, Depression, HTN, FLAVIO on CPAP, emphysema, PVD, h/o COVID requiring intubation, carotid angioplasty, cholecystectomy, TIA x3, here for evaluation of atraumatic right upper back pain and shortness of breath that began this morning upon awakening around 6 AM.  Patient denies fever, chills, worsening cough, chest pain. Patient was transferred to [facility] from [facility] with complicated PTX.    PLAN:  -care per primary team  - daily AM CXR  -monitor O2 requirements- remains on face mask   - pain control   -encourage Incentive spirometry   -pulm consulted, on all home inhalers  - can have diet

## 2022-12-19 NOTE — CONSULT NOTE ADULT - SUBJECTIVE AND OBJECTIVE BOX
Cardiologist:     HPI:  GENERAL SURGERY CONSULT NOTE    Patient: SHAUN COATES , 77y ([birth year])Female   MRN: [id number]  Location: [address]  Visit: 12-15-22 Inpatient  Date: 12-15-22 @ 18:19    HPI:  77F PMH COPD on 3-4 L nasal cannula at home, A. fib on Eliquis, pneumothorax x2, h/o pleurodesis on the right 15 years ago, CHF, BP1, Depression, HTN, FLAVIO on CPAP, emphysema, PVD, h/o COVID requiring intubation, carotid angioplasty, cholecystectomy, TIA x3, here for evaluation of atraumatic right upper back pain and shortness of breath that began this morning upon awakening around 6 AM.  Patient denies fever, chills, worsening cough, chest pain. Patient was transferred to [facility] from [facility] with complicated PTX.    PAST MEDICAL & SURGICAL HISTORY:  Hypertension      Depression      COPD (chronic obstructive pulmonary disease)      Other cardiomyopathy      Other emphysema      PVD (peripheral vascular disease)      Bipolar 1 disorder      FLAVIO on CPAP      Transient ischemic attack  x 3      Congestive heart failure      Falls      2019 novel coronavirus disease (COVID-19)      Respiratory failure requiring intubation      Afib      HLD (hyperlipidemia)      History of cholecystectomy      H/O carotid angioplasty          Home Medications:  albuterol 90 mcg/inh inhalation aerosol with adapter: 2 puff(s) inhaled every 4 hours, As Needed (23 Jun 2022 11:49)  aspirin 81 mg oral tablet:  (18 Jun 2022 08:53)  atorvastatin 20 mg oral tablet: 1 tab(s) orally once a day (18 Jun 2022 08:53)  Breo Ellipta 200 mcg-25 mcg/inh inhalation powder: puff(s) inhaled once a day (18 Jun 2022 08:53)  Eliquis 5 mg oral tablet: 1 tab(s) orally 2 times a day (18 Jun 2022 08:53)  freetext medication     -: 1 puff(s) inhaled once a day (23 Jun 2022 11:44)  gabapentin 400 mg oral capsule: 1 cap(s) orally 2 times a day (23 Jun 2022 11:44)  Incruse Ellipta 62.5 mcg/inh inhalation powder: 1 puff(s) inhaled every 24 hours (18 Jun 2022 08:53)  lamoTRIgine 100 mg oral tablet: 1 tab(s) orally 2 times a day (18 Jun 2022 08:53)  lidocaine 4% topical film: Apply topically to affected area once a day (21 Oct 2022 11:57)  LORazepam 0.5 mg oral tablet: 1  orally 2 times a day, As Needed for agitation (23 Jun 2022 11:44)  metoprolol tartrate 75 mg oral tablet: 1 tab(s) orally 2 times a day (18 Jun 2022 08:53)  montelukast 10 mg oral tablet: 1 tab(s) orally once a day (18 Jun 2022 08:53)  pantoprazole 40 mg oral delayed release tablet: 1 tab(s) orally once a day (18 Jun 2022 08:53)  polyethylene glycol 3350 oral powder for reconstitution: 17 gram(s) orally once a day (23 Jun 2022 12:08)  QUEtiapine 200 mg oral tablet: 250 milligram(s) orally once a day (at bedtime) (23 Jun 2022 11:44)  Xyzal: 1 tab(s) orally once a day (23 Jun 2022 11:44)      MEDICATIONS  (STANDING):  enoxaparin Injectable 40 milliGRAM(s) SubCutaneous every 24 hours  lidocaine   4% Patch 1 Patch Transdermal every 24 hours    MEDICATIONS  (PRN):   (15 Dec 2022 18:19)      PAST MEDICAL & SURGICAL HISTORY  Hypertension    Depression    COPD (chronic obstructive pulmonary disease)    Other cardiomyopathy    Other emphysema    PVD (peripheral vascular disease)    Bipolar 1 disorder    FLAVIO on CPAP    Transient ischemic attack  x 3    Congestive heart failure    Falls    2019 novel coronavirus disease (COVID-19)    Respiratory failure requiring intubation    Afib    HLD (hyperlipidemia)    History of cholecystectomy    H/O carotid angioplasty        FAMILY HISTORY:  FAMILY HISTORY:  FH: heart disease (Father, Mother)      [ ] no pertinent family history of premature cardiovascular disease in first degree relatives.  Mother:   Father:   Siblings:     SOCIAL HISTORY:  []smoker  []Alcohol  []Drug    ALLERGIES:  codeine (Other (Moderate))  Depakote (Unknown)  Dilaudid (Short breath; Rash)  IV Contrast (Anaphylaxis)  losartan (Angioedema)  Risperdal (Other)  verapamil (Short breath; Angioedema)      MEDICATIONS:  MEDICATIONS  (STANDING):  albuterol    90 MICROgram(s) HFA Inhaler 4 Puff(s) Inhalation every 6 hours  aMIOdarone Infusion 1 mG/Min (33.3 mL/Hr) IV Continuous <Continuous>  aMIOdarone Infusion 0.5 mG/Min (16.7 mL/Hr) IV Continuous <Continuous>  aspirin  chewable 81 milliGRAM(s) Oral daily  atorvastatin 20 milliGRAM(s) Oral at bedtime  atropine Injectable 0.6 milliGRAM(s) IV Push once  cefTRIAXone   IVPB 1000 milliGRAM(s) IV Intermittent every 24 hours  chlorhexidine 2% Cloths 1 Application(s) Topical <User Schedule>  chlorhexidine 2% Cloths 1 Application(s) Topical <User Schedule>  dexMEDEtomidine Infusion 0.2 MICROgram(s)/kG/Hr (4.54 mL/Hr) IV Continuous <Continuous>  fentaNYL   Infusion. 0.5 MICROgram(s)/kG/Hr (4.54 mL/Hr) IV Continuous <Continuous>  gabapentin 400 milliGRAM(s) Oral three times a day  ipratropium 17 MICROgram(s) HFA Inhaler 4 Puff(s) Inhalation every 6 hours  lamoTRIgine 100 milliGRAM(s) Oral two times a day  methylPREDNISolone sodium succinate Injectable 60 milliGRAM(s) IV Push every 24 hours  montelukast 10 milliGRAM(s) Oral daily  norepinephrine Infusion 0.05 MICROgram(s)/kG/Min (4.25 mL/Hr) IV Continuous <Continuous>  pantoprazole   Suspension 40 milliGRAM(s) Oral daily  polyethylene glycol 3350 17 Gram(s) Oral daily  QUEtiapine 250 milliGRAM(s) Oral at bedtime  vasopressin Infusion 0.04 Unit(s)/Min (6 mL/Hr) IV Continuous <Continuous>    MEDICATIONS  (PRN):  fentaNYL    Injectable 25 MICROGram(s) IV Push every 6 hours PRN Severe Pain (7 - 10)      HOME MEDICATIONS:  Home Medications:  albuterol 90 mcg/inh inhalation aerosol with adapter: 2 puff(s) inhaled every 4 hours, As Needed (23 Jun 2022 11:49)  amLODIPine 2.5 mg oral tablet: 1 tab(s) orally once a day (15 Dec 2022 18:22)  aspirin 81 mg oral tablet:  (18 Jun 2022 08:53)  atorvastatin 20 mg oral tablet: 1 tab(s) orally once a day (18 Jun 2022 08:53)  Breo Ellipta 200 mcg-25 mcg/inh inhalation powder: puff(s) inhaled once a day (18 Jun 2022 08:53)  Eliquis 5 mg oral tablet: 1 tab(s) orally 2 times a day (18 Jun 2022 08:53)  furosemide 40 mg oral tablet: 1 tab(s) orally once a day (15 Dec 2022 18:24)  gabapentin 400 mg oral capsule: 1 cap(s) orally 2 times a day (23 Jun 2022 11:44)  Incruse Ellipta 62.5 mcg/inh inhalation powder: 1 puff(s) inhaled every 24 hours (18 Jun 2022 08:53)  lamoTRIgine 100 mg oral tablet: 1 tab(s) orally 2 times a day (18 Jun 2022 08:53)  lidocaine 4% topical film: Apply topically to affected area once a day (21 Oct 2022 11:57)  LORazepam 0.5 mg oral tablet: 1  orally 2 times a day, As Needed for agitation (23 Jun 2022 11:44)  metoprolol tartrate 75 mg oral tablet: 1 tab(s) orally 2 times a day (18 Jun 2022 08:53)  montelukast 10 mg oral tablet: 1 tab(s) orally once a day (18 Jun 2022 08:53)  pantoprazole 40 mg oral delayed release tablet: 1 tab(s) orally once a day (18 Jun 2022 08:53)  polyethylene glycol 3350 oral powder for reconstitution: 17 gram(s) orally once a day (23 Jun 2022 12:08)  QUEtiapine 200 mg oral tablet: 250 milligram(s) orally once a day (at bedtime) (23 Jun 2022 11:44)  Xyzal: 1 tab(s) orally once a day (23 Jun 2022 11:44)      VITALS:   T(F): 99.5 (12-19 @ 16:00), Max: 104.4 (12-18 @ 16:00)  HR: 52 (12-19 @ 16:30) (48 - 160)  BP: 64/51 (12-19 @ 09:00) (56/32 - 165/54)  BP(mean): 56 (12-19 @ 09:00) (45 - 101)  RR: 24 (12-19 @ 16:30) (16 - 48)  SpO2: 96% (12-19 @ 16:30) (52% - 100%)    I&O's Summary    18 Dec 2022 07:01  -  19 Dec 2022 07:00  --------------------------------------------------------  IN: 3742.5 mL / OUT: 1761 mL / NET: 1981.5 mL    19 Dec 2022 07:01  -  19 Dec 2022 17:14  --------------------------------------------------------  IN: 1211.6 mL / OUT: 418 mL / NET: 793.6 mL        REVIEW OF SYSTEMS:    Negative except as mentioned in HPI    PHYSICAL EXAM:  NEURO: intubated, sedated  GEN: Not in acute distress  NECK: No JVD  LUNGS: Clear to auscultation bilaterally   CARDIOVASCULAR: S1/S2 present, RRR , no murmurs or rubs, no carotid bruits,  + PP bilaterally  ABD: Soft, non-tender, non-distended, +BS  EXT: No DEVAN  SKIN: Intact    LABS:                        9.4    8.08  )-----------( 237      ( 19 Dec 2022 05:14 )             30.1     12-19    137  |  104  |  28<H>  ----------------------------<  186<H>  3.4<L>   |  23  |  1.0    Ca    7.1<L>      19 Dec 2022 05:14  Phos  2.6     12-17  Mg     1.8     12-19    TPro  6.2  /  Alb  3.0<L>  /  TBili  0.4  /  DBili  x   /  AST  35  /  ALT  17  /  AlkPhos  280<H>  12-19      Lactate, Blood: 1.6 mmol/L (12-18-22 @ 17:45)    RADIOLOGY:  -CXR:  -TTE:  < from: TTE Echo Complete w/o Contrast w/ Doppler (10.18.22 @ 13:40) >  Summary:   1. Left ventricular ejection fraction, by visual estimation, is55 to   60%.   2. Elevated mean left atrial pressure.   3. Moderate left ventricular hypertrophy.   4. Spectral Doppler shows restrictive pattern of left ventricular   myocardial filling (Grade III diastolic dysfunction).   5. Mildly enlarged left atrium.   6. Mild mitral annular calcification.   7. There is mild aortic root calcification.      < end of copied text >    -CCTA:  -STRESS TEST:  -CATHETERIZATION:  < from: Cardiac Catheterization (10.25.21 @ 08:05) >  CORONARY CIRCULATION: The coronary circulation is right dominant. There was    no angiographic evidence for occlusive coronary artery disease. Left main:    The vessel was medium to large sized. Angiography showed mild    atherosclerosis with no flow limiting lesions. LAD: The vessel was medium    to large sized. Angiography showed mild atherosclerosis with no flow    limiting lesions. 1st diagonal: The vessel was medium sized. Angiography    showed mild atherosclerosis with no flow limiting lesions. Circumflex: The    vessel was medium sized. Angiography showed mild atherosclerosis with no    flow limiting lesions. 1st obtuse marginal: The vessel was medium sized.    Angiography showed mild atherosclerosis with no flow limiting lesions.    RCA: The vessel was medium to large sized. Angiography showed mild    atherosclerosis with no flow limiting lesions. Right PDA: The vessel was    small sized. Angiography showed mild atherosclerosis with no flow limiting    lesions.    < end of copied text >    ECG:  < from: 12 Lead ECG (12.15.22 @ 12:08) >  Ventricular Rate 81 BPM    Atrial Rate 625 BPM    QRS Duration 88 ms    Q-T Interval 346 ms    QTC Calculation(Bazett) 401 ms    P Axis 67 degrees    R Axis 46 degrees    T Axis 35 degrees    Diagnosis Line Normal sinus rhythm  Nonspecific ST-T changes    < end of copied text >      TELEMETRY EVENTS: Afib   Cardiologist:  Edna    HPI:  GENERAL SURGERY CONSULT NOTE    Patient: SHAUN COATES , 77y ([birth year])Female   MRN: [id number]  Location: [address]  Visit: 12-15-22 Inpatient  Date: 12-15-22 @ 18:19    HPI:  77F PMH COPD on 3-4 L nasal cannula at home, A. fib on Eliquis, pneumothorax x2, h/o pleurodesis on the right 15 years ago, CHF, BP1, Depression, HTN, FLAVIO on CPAP, emphysema, PVD, h/o COVID requiring intubation, carotid angioplasty, cholecystectomy, TIA x3, here for evaluation of atraumatic right upper back pain and shortness of breath that began this morning upon awakening around 6 AM.  Patient denies fever, chills, worsening cough, chest pain. Patient was transferred to [facility] from [facility] with complicated PTX.    PAST MEDICAL & SURGICAL HISTORY:  Hypertension      Depression      COPD (chronic obstructive pulmonary disease)      Other cardiomyopathy      Other emphysema      PVD (peripheral vascular disease)      Bipolar 1 disorder      FLAVIO on CPAP      Transient ischemic attack  x 3      Congestive heart failure      Falls      2019 novel coronavirus disease (COVID-19)      Respiratory failure requiring intubation      Afib      HLD (hyperlipidemia)      History of cholecystectomy      H/O carotid angioplasty          Home Medications:  albuterol 90 mcg/inh inhalation aerosol with adapter: 2 puff(s) inhaled every 4 hours, As Needed (23 Jun 2022 11:49)  aspirin 81 mg oral tablet:  (18 Jun 2022 08:53)  atorvastatin 20 mg oral tablet: 1 tab(s) orally once a day (18 Jun 2022 08:53)  Breo Ellipta 200 mcg-25 mcg/inh inhalation powder: puff(s) inhaled once a day (18 Jun 2022 08:53)  Eliquis 5 mg oral tablet: 1 tab(s) orally 2 times a day (18 Jun 2022 08:53)  freetext medication     -: 1 puff(s) inhaled once a day (23 Jun 2022 11:44)  gabapentin 400 mg oral capsule: 1 cap(s) orally 2 times a day (23 Jun 2022 11:44)  Incruse Ellipta 62.5 mcg/inh inhalation powder: 1 puff(s) inhaled every 24 hours (18 Jun 2022 08:53)  lamoTRIgine 100 mg oral tablet: 1 tab(s) orally 2 times a day (18 Jun 2022 08:53)  lidocaine 4% topical film: Apply topically to affected area once a day (21 Oct 2022 11:57)  LORazepam 0.5 mg oral tablet: 1  orally 2 times a day, As Needed for agitation (23 Jun 2022 11:44)  metoprolol tartrate 75 mg oral tablet: 1 tab(s) orally 2 times a day (18 Jun 2022 08:53)  montelukast 10 mg oral tablet: 1 tab(s) orally once a day (18 Jun 2022 08:53)  pantoprazole 40 mg oral delayed release tablet: 1 tab(s) orally once a day (18 Jun 2022 08:53)  polyethylene glycol 3350 oral powder for reconstitution: 17 gram(s) orally once a day (23 Jun 2022 12:08)  QUEtiapine 200 mg oral tablet: 250 milligram(s) orally once a day (at bedtime) (23 Jun 2022 11:44)  Xyzal: 1 tab(s) orally once a day (23 Jun 2022 11:44)      MEDICATIONS  (STANDING):  enoxaparin Injectable 40 milliGRAM(s) SubCutaneous every 24 hours  lidocaine   4% Patch 1 Patch Transdermal every 24 hours    MEDICATIONS  (PRN):   (15 Dec 2022 18:19)      PAST MEDICAL & SURGICAL HISTORY  Hypertension    Depression    COPD (chronic obstructive pulmonary disease)    Other cardiomyopathy    Other emphysema    PVD (peripheral vascular disease)    Bipolar 1 disorder    FLAVIO on CPAP    Transient ischemic attack  x 3    Congestive heart failure    Falls    2019 novel coronavirus disease (COVID-19)    Respiratory failure requiring intubation    Afib    HLD (hyperlipidemia)    History of cholecystectomy    H/O carotid angioplasty        FAMILY HISTORY:  FAMILY HISTORY:  FH: heart disease (Father, Mother)      [ ] no pertinent family history of premature cardiovascular disease in first degree relatives.  Mother:   Father:   Siblings:     SOCIAL HISTORY:  []smoker  []Alcohol  []Drug    ALLERGIES:  codeine (Other (Moderate))  Depakote (Unknown)  Dilaudid (Short breath; Rash)  IV Contrast (Anaphylaxis)  losartan (Angioedema)  Risperdal (Other)  verapamil (Short breath; Angioedema)      MEDICATIONS:  MEDICATIONS  (STANDING):  albuterol    90 MICROgram(s) HFA Inhaler 4 Puff(s) Inhalation every 6 hours  aMIOdarone Infusion 1 mG/Min (33.3 mL/Hr) IV Continuous <Continuous>  aMIOdarone Infusion 0.5 mG/Min (16.7 mL/Hr) IV Continuous <Continuous>  aspirin  chewable 81 milliGRAM(s) Oral daily  atorvastatin 20 milliGRAM(s) Oral at bedtime  atropine Injectable 0.6 milliGRAM(s) IV Push once  cefTRIAXone   IVPB 1000 milliGRAM(s) IV Intermittent every 24 hours  chlorhexidine 2% Cloths 1 Application(s) Topical <User Schedule>  chlorhexidine 2% Cloths 1 Application(s) Topical <User Schedule>  dexMEDEtomidine Infusion 0.2 MICROgram(s)/kG/Hr (4.54 mL/Hr) IV Continuous <Continuous>  fentaNYL   Infusion. 0.5 MICROgram(s)/kG/Hr (4.54 mL/Hr) IV Continuous <Continuous>  gabapentin 400 milliGRAM(s) Oral three times a day  ipratropium 17 MICROgram(s) HFA Inhaler 4 Puff(s) Inhalation every 6 hours  lamoTRIgine 100 milliGRAM(s) Oral two times a day  methylPREDNISolone sodium succinate Injectable 60 milliGRAM(s) IV Push every 24 hours  montelukast 10 milliGRAM(s) Oral daily  norepinephrine Infusion 0.05 MICROgram(s)/kG/Min (4.25 mL/Hr) IV Continuous <Continuous>  pantoprazole   Suspension 40 milliGRAM(s) Oral daily  polyethylene glycol 3350 17 Gram(s) Oral daily  QUEtiapine 250 milliGRAM(s) Oral at bedtime  vasopressin Infusion 0.04 Unit(s)/Min (6 mL/Hr) IV Continuous <Continuous>    MEDICATIONS  (PRN):  fentaNYL    Injectable 25 MICROGram(s) IV Push every 6 hours PRN Severe Pain (7 - 10)      HOME MEDICATIONS:  Home Medications:  albuterol 90 mcg/inh inhalation aerosol with adapter: 2 puff(s) inhaled every 4 hours, As Needed (23 Jun 2022 11:49)  amLODIPine 2.5 mg oral tablet: 1 tab(s) orally once a day (15 Dec 2022 18:22)  aspirin 81 mg oral tablet:  (18 Jun 2022 08:53)  atorvastatin 20 mg oral tablet: 1 tab(s) orally once a day (18 Jun 2022 08:53)  Breo Ellipta 200 mcg-25 mcg/inh inhalation powder: puff(s) inhaled once a day (18 Jun 2022 08:53)  Eliquis 5 mg oral tablet: 1 tab(s) orally 2 times a day (18 Jun 2022 08:53)  furosemide 40 mg oral tablet: 1 tab(s) orally once a day (15 Dec 2022 18:24)  gabapentin 400 mg oral capsule: 1 cap(s) orally 2 times a day (23 Jun 2022 11:44)  Incruse Ellipta 62.5 mcg/inh inhalation powder: 1 puff(s) inhaled every 24 hours (18 Jun 2022 08:53)  lamoTRIgine 100 mg oral tablet: 1 tab(s) orally 2 times a day (18 Jun 2022 08:53)  lidocaine 4% topical film: Apply topically to affected area once a day (21 Oct 2022 11:57)  LORazepam 0.5 mg oral tablet: 1  orally 2 times a day, As Needed for agitation (23 Jun 2022 11:44)  metoprolol tartrate 75 mg oral tablet: 1 tab(s) orally 2 times a day (18 Jun 2022 08:53)  montelukast 10 mg oral tablet: 1 tab(s) orally once a day (18 Jun 2022 08:53)  pantoprazole 40 mg oral delayed release tablet: 1 tab(s) orally once a day (18 Jun 2022 08:53)  polyethylene glycol 3350 oral powder for reconstitution: 17 gram(s) orally once a day (23 Jun 2022 12:08)  QUEtiapine 200 mg oral tablet: 250 milligram(s) orally once a day (at bedtime) (23 Jun 2022 11:44)  Xyzal: 1 tab(s) orally once a day (23 Jun 2022 11:44)      VITALS:   T(F): 99.5 (12-19 @ 16:00), Max: 104.4 (12-18 @ 16:00)  HR: 52 (12-19 @ 16:30) (48 - 160)  BP: 64/51 (12-19 @ 09:00) (56/32 - 165/54)  BP(mean): 56 (12-19 @ 09:00) (45 - 101)  RR: 24 (12-19 @ 16:30) (16 - 48)  SpO2: 96% (12-19 @ 16:30) (52% - 100%)    I&O's Summary    18 Dec 2022 07:01  -  19 Dec 2022 07:00  --------------------------------------------------------  IN: 3742.5 mL / OUT: 1761 mL / NET: 1981.5 mL    19 Dec 2022 07:01  -  19 Dec 2022 17:14  --------------------------------------------------------  IN: 1211.6 mL / OUT: 418 mL / NET: 793.6 mL        REVIEW OF SYSTEMS:    Negative except as mentioned in HPI    PHYSICAL EXAM:  NEURO: intubated, sedated  GEN: Not in acute distress  NECK: No JVD  LUNGS: Clear to auscultation bilaterally   CARDIOVASCULAR: S1/S2 present, RRR , no murmurs or rubs, no carotid bruits,  + PP bilaterally  ABD: Soft, non-tender, non-distended, +BS  EXT: No DEVAN  SKIN: Intact    LABS:                        9.4    8.08  )-----------( 237      ( 19 Dec 2022 05:14 )             30.1     12-19    137  |  104  |  28<H>  ----------------------------<  186<H>  3.4<L>   |  23  |  1.0    Ca    7.1<L>      19 Dec 2022 05:14  Phos  2.6     12-17  Mg     1.8     12-19    TPro  6.2  /  Alb  3.0<L>  /  TBili  0.4  /  DBili  x   /  AST  35  /  ALT  17  /  AlkPhos  280<H>  12-19      Lactate, Blood: 1.6 mmol/L (12-18-22 @ 17:45)    RADIOLOGY:  -CXR:  -TTE:  < from: TTE Echo Complete w/o Contrast w/ Doppler (10.18.22 @ 13:40) >  Summary:   1. Left ventricular ejection fraction, by visual estimation, is55 to   60%.   2. Elevated mean left atrial pressure.   3. Moderate left ventricular hypertrophy.   4. Spectral Doppler shows restrictive pattern of left ventricular   myocardial filling (Grade III diastolic dysfunction).   5. Mildly enlarged left atrium.   6. Mild mitral annular calcification.   7. There is mild aortic root calcification.      < end of copied text >    -CCTA:  -STRESS TEST:  -CATHETERIZATION:  < from: Cardiac Catheterization (10.25.21 @ 08:05) >  CORONARY CIRCULATION: The coronary circulation is right dominant. There was    no angiographic evidence for occlusive coronary artery disease. Left main:    The vessel was medium to large sized. Angiography showed mild    atherosclerosis with no flow limiting lesions. LAD: The vessel was medium    to large sized. Angiography showed mild atherosclerosis with no flow    limiting lesions. 1st diagonal: The vessel was medium sized. Angiography    showed mild atherosclerosis with no flow limiting lesions. Circumflex: The    vessel was medium sized. Angiography showed mild atherosclerosis with no    flow limiting lesions. 1st obtuse marginal: The vessel was medium sized.    Angiography showed mild atherosclerosis with no flow limiting lesions.    RCA: The vessel was medium to large sized. Angiography showed mild    atherosclerosis with no flow limiting lesions. Right PDA: The vessel was    small sized. Angiography showed mild atherosclerosis with no flow limiting    lesions.    < end of copied text >    ECG:  < from: 12 Lead ECG (12.15.22 @ 12:08) >  Ventricular Rate 81 BPM    Atrial Rate 625 BPM    QRS Duration 88 ms    Q-T Interval 346 ms    QTC Calculation(Bazett) 401 ms    P Axis 67 degrees    R Axis 46 degrees    T Axis 35 degrees    Diagnosis Line Normal sinus rhythm  Nonspecific ST-T changes    < end of copied text >      TELEMETRY EVENTS: Afib

## 2022-12-19 NOTE — PROCEDURE NOTE - NSSITEPREP_SKIN_A_CORE
alcohol
chlorhexidine
chlorhexidine/Adherence to aseptic technique: hand hygiene prior to donning barriers (gown, gloves), don cap and mask, sterile drape over patient

## 2022-12-19 NOTE — CONSULT NOTE ADULT - ATTENDING COMMENTS
Ms Henderson was seen in examined at bedside, with her  and son present. Patient is well known to Dr Vickers, with severe emphysema and bullous lung disease, who had a right-sided pluridesis performed about 15 years ago. The patient presented with recurrent right-sided back pain and shortness of breath, and was found to have a new pneumothorax. Thoracic surgery placed a right-sided chest tube to suction, and we are consulted for management of COPD. Patient is in severe pain with regards to her tube, and would benefit from increased pain management for deep breathing and secretion clearance. Otherwise recommend continuing outpatient management with triple inhaled therapy as per Dr Vickers. She has known sleep apnea, however in the setting of pneumothorax would avoid any positive airway pressure. Patient may require additional supplemental oxygen while asleep for this reason. Chest tube management as per thoracic surgery team.
Prior Falls of unknown etiology  Paroxysmal AF  Significant conversion pause    DC amio  Needs dual chamber PPM when ready and cleared by CTS and Critical care team

## 2022-12-19 NOTE — PROGRESS NOTE ADULT - SUBJECTIVE AND OBJECTIVE BOX
`GENERAL SURGERY PROGRESS NOTE    Patient: SHAUN COATES , 77y ([birth year])Female   MRN: [id number]  Location: [facility]  A  Visit: 12-15-22 Inpatient  Date: 12-19-22 @ 12:29    Hospital Day #:5  Events of past 24 hours: +airleak on R pigtail. Patient upgraded to MICU, on high flow NC. Tachy, HoTN, on levo     PAST MEDICAL & SURGICAL HISTORY:  Hypertension      Depression      COPD (chronic obstructive pulmonary disease)      Other cardiomyopathy      Other emphysema      PVD (peripheral vascular disease)      Bipolar 1 disorder      FLAVIO on CPAP      Transient ischemic attack  x 3      Congestive heart failure      Falls      2019 novel coronavirus disease (COVID-19)      Respiratory failure requiring intubation      Afib      HLD (hyperlipidemia)      History of cholecystectomy      H/O carotid angioplasty          Vitals:   T(F): 100.9 (12-19-22 @ 11:00), Max: 104.4 (12-18-22 @ 16:00)  HR: 146 (12-19-22 @ 11:00)  BP: 64/51 (12-19-22 @ 09:00)  RR: 28 (12-19-22 @ 11:00)  SpO2: 96% (12-19-22 @ 11:00)      Diet, NPO      Fluids:     I & O's:    12-18-22 @ 07:01  -  12-19-22 @ 07:00  --------------------------------------------------------  IN:    IV PiggyBack: 550 mL    Norepinephrine: 232.5 mL    Oral Fluid: 960 mL    Sodium Chloride 0.9% Bolus: 2000 mL  Total IN: 3742.5 mL    OUT:    Chest Tube (mL): 161 mL    Voided (mL): 1600 mL  Total OUT: 1761 mL    Total NET: 1981.5 mL        PHYSICAL EXAM:  General: NAD, AAOx3, calm and cooperative  HEENT: NCAT, MARY JANE, EOMI,   Cardiac: irregular  Respiratory: CTAB, normal respiratory effort,  Abdomen: Soft, non-distended, non-tender,   Vascular: Pulses 2+ throughout, extremities well perfused  Skin: Warm/dry, normal color, no jaundice    MEDICATIONS  (STANDING):  acetaminophen     Tablet .. 650 milliGRAM(s) Oral every 6 hours  aMIOdarone Infusion 1 mG/Min (33.3 mL/Hr) IV Continuous <Continuous>  aMIOdarone Infusion 0.5 mG/Min (16.7 mL/Hr) IV Continuous <Continuous>  aspirin  chewable 81 milliGRAM(s) Oral daily  atorvastatin 20 milliGRAM(s) Oral at bedtime  benzonatate 100 milliGRAM(s) Oral every 8 hours  budesonide 160 MICROgram(s)/formoterol 4.5 MICROgram(s) Inhaler 2 Puff(s) Inhalation two times a day  cefTRIAXone   IVPB 1000 milliGRAM(s) IV Intermittent every 24 hours  chlorhexidine 2% Cloths 1 Application(s) Topical <User Schedule>  dexMEDEtomidine Infusion 0.2 MICROgram(s)/kG/Hr (4.54 mL/Hr) IV Continuous <Continuous>  fentaNYL   Infusion. 0.5 MICROgram(s)/kG/Hr (4.54 mL/Hr) IV Continuous <Continuous>  fluticasone furoate/vilanterol 200-25 MICROgram(s) Inhaler 1 Puff(s) Inhalation daily  gabapentin 400 milliGRAM(s) Oral three times a day  lamoTRIgine 100 milliGRAM(s) Oral two times a day  lidocaine   4% Patch 1 Patch Transdermal every 24 hours  loratadine 10 milliGRAM(s) Oral daily  methocarbamol 500 milliGRAM(s) Oral two times a day  metoprolol tartrate Injectable 5 milliGRAM(s) IV Push once  metoprolol tartrate Injectable 5 milliGRAM(s) IV Push once  montelukast 10 milliGRAM(s) Oral daily  norepinephrine Infusion 0.05 MICROgram(s)/kG/Min (8.5 mL/Hr) IV Continuous <Continuous>  pantoprazole    Tablet 40 milliGRAM(s) Oral before breakfast  phenylephrine    Infusion 0.1 MICROgram(s)/kG/Min (3.4 mL/Hr) IV Continuous <Continuous>  polyethylene glycol 3350 17 Gram(s) Oral daily  potassium chloride  20 mEq/100 mL IVPB 20 milliEquivalent(s) IV Intermittent once  QUEtiapine 250 milliGRAM(s) Oral at bedtime  umeclidinium 62.5 MICROgram(s) Inhaler 1 Puff(s) Inhalation daily  vasopressin Infusion 0.04 Unit(s)/Min (6 mL/Hr) IV Continuous <Continuous>    MEDICATIONS  (PRN):  albuterol    90 MICROgram(s) HFA Inhaler 2 Puff(s) Inhalation every 6 hours PRN Shortness of Breath and/or Wheezing  albuterol/ipratropium for Nebulization 3 milliLiter(s) Nebulizer every 6 hours PRN Shortness of Breath and/or Wheezing  fentaNYL    Injectable 25 MICROGram(s) IV Push every 6 hours PRN Severe Pain (7 - 10)  LORazepam     Tablet 1 milliGRAM(s) Oral two times a day PRN Anxiety      DVT PROPHYLAXIS:   GI PROPHYLAXIS: pantoprazole    Tablet 40 milliGRAM(s) Oral before breakfast    ANTICOAGULATION:   ANTIBIOTICS:  cefTRIAXone   IVPB 1000 milliGRAM(s)            LAB/STUDIES:  Labs:  CAPILLARY BLOOD GLUCOSE                              9.4    8.08  )-----------( 237      ( 19 Dec 2022 05:14 )             30.1       Auto Neutrophil %: 78.8 % (12-19-22 @ 05:14)  Auto Immature Granulocyte %: 0.2 % (12-19-22 @ 05:14)  Auto Neutrophil %: 83.8 % (12-18-22 @ 17:48)  Auto Immature Granulocyte %: 0.6 % (12-18-22 @ 17:48)    12-19    137  |  104  |  28<H>  ----------------------------<  186<H>  3.4<L>   |  23  |  1.0      Calcium, Total Serum: 7.1 mg/dL (12-19-22 @ 05:14)      LFTs:             6.2  | 0.4  | 35       ------------------[280     ( 19 Dec 2022 05:14 )  3.0  | x    | 17          Lipase:x      Amylase:x         Blood Gas Arterial, Lactate: 1.10 mmol/L (12-19-22 @ 11:19)  Blood Gas Arterial, Lactate: 2.60 mmol/L (12-19-22 @ 09:49)  Blood Gas Arterial, Lactate: 0.90 mmol/L (12-19-22 @ 00:02)  Lactate, Blood: 1.6 mmol/L (12-18-22 @ 17:45)  Blood Gas Arterial, Lactate: 0.90 mmol/L (12-17-22 @ 05:29)    ABG - ( 19 Dec 2022 11:19 )  pH: 7.28  /  pCO2: 49    /  pO2: 161   / HCO3: 23    / Base Excess: -3.8  /  SaO2: 100.0           ABG - ( 19 Dec 2022 09:49 )  pH: 7.24  /  pCO2: 51    /  pO2: 37    / HCO3: 22    / Base Excess: -5.5  /  SaO2: 60.0            ABG - ( 19 Dec 2022 00:02 )  pH: 7.33  /  pCO2: 44    /  pO2: 71    / HCO3: 23    / Base Excess: -2.7  /  SaO2: 94.0              Coags:        Serum Pro-Brain Natriuretic Peptide: 592 pg/mL (12-15-22 @ 11:30)          Culture - Urine (collected 16 Dec 2022 20:10)  Source: Clean Catch Clean Catch (Midstream)  Preliminary Report (19 Dec 2022 06:40):    >100,000 CFU/ml Escherichia coli              IMAGING:  < from: Xray Chest 1 View- PORTABLE-Routine (Xray Chest 1 View- PORTABLE-Routine in AM.) (12.19.22 @ 06:37) >  Again seen is a right-sided pneumothorax, difficult to compare to prior   examination due to rotation to left    Stable bilateral lung opacities    Stable diffuse right chest wall subcutaneous emphysema    < end of copied text >

## 2022-12-19 NOTE — PROGRESS NOTE ADULT - SUBJECTIVE AND OBJECTIVE BOX
Patient is a 77y old  Female who presents with a chief complaint of Pneumonthorax (16 Dec 2022 14:40)        Over Night Events:  Tachycardic.  In Respiratory distress.  On Pressors.          ROS:     All ROS are negative except HPI         PHYSICAL EXAM    ICU Vital Signs Last 24 Hrs  T(C): 37.2 (19 Dec 2022 04:00), Max: 40.2 (18 Dec 2022 16:00)  T(F): 99 (19 Dec 2022 04:00), Max: 104.4 (18 Dec 2022 16:00)  HR: 104 (19 Dec 2022 07:15) (90 - 130)  BP: 109/48 (19 Dec 2022 07:15) (56/32 - 165/54)  BP(mean): 67 (19 Dec 2022 07:15) (45 - 101)  ABP: --  ABP(mean): --  RR: 18 (19 Dec 2022 07:15) (16 - 48)  SpO2: 92% (19 Dec 2022 08:18) (78% - 100%)    O2 Parameters below as of 19 Dec 2022 08:18  Patient On (Oxygen Delivery Method): nasal cannula, high flow  O2 Flow (L/min): 60  O2 Concentration (%): 50        CONSTITUTIONAL:  Ill appearing in moderate distress     ENT:   Airway patent,   Mouth with normal mucosa.       EYES:   Pupils equal,   Round and reactive to light.    CARDIAC:   Tachycardic   Irregular rhythm.          RESPIRATORY:   Exp wheezing  Bilateral ronchi   Bilateral BS  Normal chest expansion  tachypneic,   use of accessory muscles    GASTROINTESTINAL:  Abdomen soft,   Non-tender,   No guarding,   + BS    MUSCULOSKELETAL:   Range of motion is not limited,  No clubbing, cyanosis    NEUROLOGICAL:   Alert   No motor  deficits.    SKIN:   Skin normal color for race,   Warm and dry and   No evidence of rash.    PSYCHIATRIC:   No apparent risk to self or others.        12-18-22 @ 07:01  -  12-19-22 @ 07:00  --------------------------------------------------------  IN:    IV PiggyBack: 550 mL    Norepinephrine: 232.5 mL    Oral Fluid: 960 mL    Sodium Chloride 0.9% Bolus: 2000 mL  Total IN: 3742.5 mL    OUT:    Chest Tube (mL): 161 mL    Voided (mL): 1600 mL  Total OUT: 1761 mL    Total NET: 1981.5 mL          LABS:                            9.4    8.08  )-----------( 237      ( 19 Dec 2022 05:14 )             30.1                                               12-19    137  |  104  |  28<H>  ----------------------------<  186<H>  3.4<L>   |  23  |  1.0    Ca    7.1<L>      19 Dec 2022 05:14  Phos  2.6     12-17  Mg     1.8     12-19    TPro  6.2  /  Alb  3.0<L>  /  TBili  0.4  /  DBili  x   /  AST  35  /  ALT  17  /  AlkPhos  280<H>  12-19                                                                                           LIVER FUNCTIONS - ( 19 Dec 2022 05:14 )  Alb: 3.0 g/dL / Pro: 6.2 g/dL / ALK PHOS: 280 U/L / ALT: 17 U/L / AST: 35 U/L / GGT: x                                                  Culture - Urine (collected 16 Dec 2022 20:10)  Source: Clean Catch Clean Catch (Midstream)  Preliminary Report (19 Dec 2022 06:40):    >100,000 CFU/ml Escherichia coli                                                                                       ABG - ( 19 Dec 2022 00:02 )  pH, Arterial: 7.33  pH, Blood: x     /  pCO2: 44    /  pO2: 71    / HCO3: 23    / Base Excess: -2.7  /  SaO2: 94.0                MEDICATIONS  (STANDING):  acetaminophen     Tablet .. 650 milliGRAM(s) Oral every 6 hours  aMIOdarone Infusion 1 mG/Min (33.3 mL/Hr) IV Continuous <Continuous>  aMIOdarone Infusion 0.5 mG/Min (16.7 mL/Hr) IV Continuous <Continuous>  amLODIPine   Tablet 2.5 milliGRAM(s) Oral daily  aspirin  chewable 81 milliGRAM(s) Oral daily  atorvastatin 20 milliGRAM(s) Oral at bedtime  benzonatate 100 milliGRAM(s) Oral every 8 hours  budesonide 160 MICROgram(s)/formoterol 4.5 MICROgram(s) Inhaler 2 Puff(s) Inhalation two times a day  cefTRIAXone   IVPB 1000 milliGRAM(s) IV Intermittent every 24 hours  chlorhexidine 2% Cloths 1 Application(s) Topical <User Schedule>  dexMEDEtomidine Infusion 0.2 MICROgram(s)/kG/Hr (4.54 mL/Hr) IV Continuous <Continuous>  fentaNYL   Infusion. 0.5 MICROgram(s)/kG/Hr (4.54 mL/Hr) IV Continuous <Continuous>  fluticasone furoate/vilanterol 200-25 MICROgram(s) Inhaler 1 Puff(s) Inhalation daily  gabapentin 400 milliGRAM(s) Oral three times a day  lamoTRIgine 100 milliGRAM(s) Oral two times a day  lidocaine   4% Patch 1 Patch Transdermal every 24 hours  loratadine 10 milliGRAM(s) Oral daily  methocarbamol 500 milliGRAM(s) Oral two times a day  metoprolol tartrate 75 milliGRAM(s) Oral two times a day  metoprolol tartrate Injectable 5 milliGRAM(s) IV Push once  metoprolol tartrate Injectable 5 milliGRAM(s) IV Push once  montelukast 10 milliGRAM(s) Oral daily  norepinephrine Infusion 0.05 MICROgram(s)/kG/Min (8.5 mL/Hr) IV Continuous <Continuous>  pantoprazole    Tablet 40 milliGRAM(s) Oral before breakfast  phenylephrine    Infusion 0.1 MICROgram(s)/kG/Min (3.4 mL/Hr) IV Continuous <Continuous>  polyethylene glycol 3350 17 Gram(s) Oral daily  potassium chloride  20 mEq/100 mL IVPB 20 milliEquivalent(s) IV Intermittent once  QUEtiapine 250 milliGRAM(s) Oral at bedtime  umeclidinium 62.5 MICROgram(s) Inhaler 1 Puff(s) Inhalation daily  vasopressin Infusion 0.04 Unit(s)/Min (6 mL/Hr) IV Continuous <Continuous>    MEDICATIONS  (PRN):  albuterol    90 MICROgram(s) HFA Inhaler 2 Puff(s) Inhalation every 6 hours PRN Shortness of Breath and/or Wheezing  albuterol/ipratropium for Nebulization 3 milliLiter(s) Nebulizer every 6 hours PRN Shortness of Breath and/or Wheezing  fentaNYL    Injectable 25 MICROGram(s) IV Push every 6 hours PRN Severe Pain (7 - 10)  LORazepam     Tablet 1 milliGRAM(s) Oral two times a day PRN Anxiety      New X-rays reviewed:                                                                                  ECHO    CXR interpreted by me:

## 2022-12-19 NOTE — PROGRESS NOTE ADULT - SUBJECTIVE AND OBJECTIVE BOX
SHAUN COATES  77y  Female      Patient is a 77y old  Female who presents with a chief complaint of Pneumonthorax (16 Dec 2022 14:40)      INTERVAL HPI/OVERNIGHT EVENTS:  Febrile overnight upto 104F; requiring pressors for the BP    REVIEW OF SYSTEMS:  unobtainable       T(C): 37.3 (12-19-22 @ 12:00), Max: 40.2 (12-18-22 @ 16:00)  HR: 60 (12-19-22 @ 13:30) (60 - 160)  BP: 64/51 (12-19-22 @ 09:00) (56/32 - 165/54)  RR: 25 (12-19-22 @ 13:30) (16 - 48)  SpO2: 95% (12-19-22 @ 13:30) (52% - 100%)  Wt(kg): --Vital Signs Last 24 Hrs  T(C): 37.3 (19 Dec 2022 12:00), Max: 40.2 (18 Dec 2022 16:00)  T(F): 99.1 (19 Dec 2022 12:00), Max: 104.4 (18 Dec 2022 16:00)  HR: 60 (19 Dec 2022 13:30) (60 - 160)  BP: 64/51 (19 Dec 2022 09:00) (56/32 - 165/54)  BP(mean): 56 (19 Dec 2022 09:00) (45 - 101)  RR: 25 (19 Dec 2022 13:30) (16 - 48)  SpO2: 95% (19 Dec 2022 13:30) (52% - 100%)    Parameters below as of 19 Dec 2022 13:00  Patient On (Oxygen Delivery Method): ventilator    O2 Concentration (%): 80    PHYSICAL EXAM:  GENERAL: NAD, well-groomed, well-developed  NECK: Supple, No JVD, Normal thyroid  NERVOUS SYSTEM:  Alert & Oriented X3, Motor Strength 5/5 B/L upper and lower extremities;   CHEST/LUNG: Decreased BS on the rt side; No rales, rhonchi, wheezing, or rubs; Chest tube draining on the right side   HEART: Regular rate and rhythm; No murmurs, rubs, or gallops  ABDOMEN: Soft, Nontender, Nondistended; Bowel sounds present  EXTREMITIES:  2+ Peripheral Pulses, No clubbing, cyanosis, or edema      Consultant(s) Notes Reviewed:  [x ] YES  [ ] NO  Care Discussed with Consultants/Other Providers [ x] YES  [ ] NO    Labs reviewed   Imaging Reviewed     albuterol    90 MICROgram(s) HFA Inhaler 2 Puff(s) Inhalation every 6 hours PRN  albuterol    90 MICROgram(s) HFA Inhaler 1 Puff(s) Inhalation every 4 hours  aMIOdarone Infusion 1 mG/Min IV Continuous <Continuous>  aMIOdarone Infusion 0.5 mG/Min IV Continuous <Continuous>  aspirin  chewable 81 milliGRAM(s) Oral daily  atorvastatin 20 milliGRAM(s) Oral at bedtime  cefTRIAXone   IVPB 1000 milliGRAM(s) IV Intermittent every 24 hours  chlorhexidine 2% Cloths 1 Application(s) Topical <User Schedule>  chlorhexidine 2% Cloths 1 Application(s) Topical <User Schedule>  dexMEDEtomidine Infusion 0.2 MICROgram(s)/kG/Hr IV Continuous <Continuous>  fentaNYL    Injectable 25 MICROGram(s) IV Push every 6 hours PRN  fentaNYL   Infusion. 0.5 MICROgram(s)/kG/Hr IV Continuous <Continuous>  gabapentin 400 milliGRAM(s) Oral three times a day  lamoTRIgine 100 milliGRAM(s) Oral two times a day  methylPREDNISolone sodium succinate Injectable 60 milliGRAM(s) IV Push every 24 hours  montelukast 10 milliGRAM(s) Oral daily  norepinephrine Infusion 0.05 MICROgram(s)/kG/Min IV Continuous <Continuous>  pantoprazole   Suspension 40 milliGRAM(s) Oral daily  phenylephrine    Infusion 0.1 MICROgram(s)/kG/Min IV Continuous <Continuous>  polyethylene glycol 3350 17 Gram(s) Oral daily  potassium chloride  20 mEq/100 mL IVPB 20 milliEquivalent(s) IV Intermittent once  QUEtiapine 250 milliGRAM(s) Oral at bedtime  vasopressin Infusion 0.04 Unit(s)/Min IV Continuous <Continuous>      ASSESSMENT AND PLAN:    #Acute hypoxemic respiratory failure   #Right pneumothorax SP pig tail.  Persistent air leak and non expanding lung   #COPD exacerbation; H/O Chronic hypoxemic respiratory failure on home O2   - HOB @ 45 degrees.   - Pt was intubated due to worsening breathlessness and increased work of breathing  - wean O2   -Monitor PPL, DP, and auto PEEP.    - Start Solumedrol 60 mg Q24.   - c/w Nebs Q4 and PRN.    - DW CTS and large bore Chest tube placed superior to the previous pigtail location  - position checked       #Septic Shock / obstructive shock   - Pt was febrile to 104F  - Cultures taken and pt       #Afib with RVR   -   CNS:  Needs intubation dn MV.  Sedation after mV.  Might need paralysis         CARDIOVASCULAR:  Goal directed fluid resuscitation. SP 2 liters R.  Rate control with Amiodarone.  Hold anti hypertensives.      GI: GI prophylaxis.  OG Feeding oncwe stabilized.  Bowel regimen     RENAL:  Follow up lytes.  Correct as needed    INFECTIOUS DISEASE: Follow up cultures.  Continue ABX for now.  Repeat culutres.      HEMATOLOGICAL:  DVT prophylaxis.  Hold Eliquis.  LMWH in am     ENDOCRINE:  Follow up FS.  Insulin protocol if needed.     MUSCULOSKELETAL:  Bed rest     A line  TLC   Freeman     Overall prognosis poor     DW Family at length            SHAUN COATES  77y  Female      Patient is a 77y old  Female who presents with a chief complaint of Pneumonthorax (16 Dec 2022 14:40)      INTERVAL HPI/OVERNIGHT EVENTS:  Febrile overnight upto 104F; requiring pressors for the BP    REVIEW OF SYSTEMS:  unobtainable       T(C): 37.3 (12-19-22 @ 12:00), Max: 40.2 (12-18-22 @ 16:00)  HR: 60 (12-19-22 @ 13:30) (60 - 160)  BP: 64/51 (12-19-22 @ 09:00) (56/32 - 165/54)  RR: 25 (12-19-22 @ 13:30) (16 - 48)  SpO2: 95% (12-19-22 @ 13:30) (52% - 100%)  Wt(kg): --Vital Signs Last 24 Hrs  T(C): 37.3 (19 Dec 2022 12:00), Max: 40.2 (18 Dec 2022 16:00)  T(F): 99.1 (19 Dec 2022 12:00), Max: 104.4 (18 Dec 2022 16:00)  HR: 60 (19 Dec 2022 13:30) (60 - 160)  BP: 64/51 (19 Dec 2022 09:00) (56/32 - 165/54)  BP(mean): 56 (19 Dec 2022 09:00) (45 - 101)  RR: 25 (19 Dec 2022 13:30) (16 - 48)  SpO2: 95% (19 Dec 2022 13:30) (52% - 100%)    Parameters below as of 19 Dec 2022 13:00  Patient On (Oxygen Delivery Method): ventilator    O2 Concentration (%): 80    PHYSICAL EXAM:  GENERAL: NAD, well-groomed, well-developed  NECK: Supple, No JVD, Normal thyroid  NERVOUS SYSTEM:  Alert & Oriented X3, Motor Strength 5/5 B/L upper and lower extremities;   CHEST/LUNG: Decreased BS on the rt side; No rales, rhonchi, wheezing, or rubs; Chest tube draining on the right side   HEART: Regular rate and rhythm; No murmurs, rubs, or gallops  ABDOMEN: Soft, Nontender, Nondistended; Bowel sounds present  EXTREMITIES:  2+ Peripheral Pulses, No clubbing, cyanosis, or edema      Consultant(s) Notes Reviewed:  [x ] YES  [ ] NO  Care Discussed with Consultants/Other Providers [ x] YES  [ ] NO    Labs reviewed   Imaging Reviewed     albuterol    90 MICROgram(s) HFA Inhaler 2 Puff(s) Inhalation every 6 hours PRN  albuterol    90 MICROgram(s) HFA Inhaler 1 Puff(s) Inhalation every 4 hours  aMIOdarone Infusion 1 mG/Min IV Continuous <Continuous>  aMIOdarone Infusion 0.5 mG/Min IV Continuous <Continuous>  aspirin  chewable 81 milliGRAM(s) Oral daily  atorvastatin 20 milliGRAM(s) Oral at bedtime  cefTRIAXone   IVPB 1000 milliGRAM(s) IV Intermittent every 24 hours  chlorhexidine 2% Cloths 1 Application(s) Topical <User Schedule>  chlorhexidine 2% Cloths 1 Application(s) Topical <User Schedule>  dexMEDEtomidine Infusion 0.2 MICROgram(s)/kG/Hr IV Continuous <Continuous>  fentaNYL    Injectable 25 MICROGram(s) IV Push every 6 hours PRN  fentaNYL   Infusion. 0.5 MICROgram(s)/kG/Hr IV Continuous <Continuous>  gabapentin 400 milliGRAM(s) Oral three times a day  lamoTRIgine 100 milliGRAM(s) Oral two times a day  methylPREDNISolone sodium succinate Injectable 60 milliGRAM(s) IV Push every 24 hours  montelukast 10 milliGRAM(s) Oral daily  norepinephrine Infusion 0.05 MICROgram(s)/kG/Min IV Continuous <Continuous>  pantoprazole   Suspension 40 milliGRAM(s) Oral daily  phenylephrine    Infusion 0.1 MICROgram(s)/kG/Min IV Continuous <Continuous>  polyethylene glycol 3350 17 Gram(s) Oral daily  potassium chloride  20 mEq/100 mL IVPB 20 milliEquivalent(s) IV Intermittent once  QUEtiapine 250 milliGRAM(s) Oral at bedtime  vasopressin Infusion 0.04 Unit(s)/Min IV Continuous <Continuous>      ASSESSMENT AND PLAN:    #Acute hypoxemic respiratory failure   #Right pneumothorax SP pig tail.  Persistent air leak and non expanding lung   #COPD exacerbation; H/O Chronic hypoxemic respiratory failure on home O2   - HOB @ 45 degrees.   - Pt was intubated due to worsening breathlessness and increased work of breathing  - wean O2   -Monitor PPL, DP, and auto PEEP.    - Start Solumedrol 60 mg Q24.   - c/w Nebs Q4 and PRN.    - DW CTS and large bore Chest tube placed superior to the previous pigtail location  - position checked       #Septic Shock / obstructive shock   - Pt was febrile to 104F  - Low BP - requiring levophed and vasopressin   - Cultures taken and pt continued on Ceftriaxone  - Procal, cultures awaited       #Afib with RVR   - pt developed RVR with a HR upto 200/min  - 5 mg lopressor stat given - HR controlled to 140's   - started amiodarone - pt reverted back to sinus rhythm with HR of         CARDIOVASCULAR:  Goal directed fluid resuscitation. SP 2 liters R.  Rate control with Amiodarone.  Hold anti hypertensives.      GI: GI prophylaxis.  OG Feeding oncwe stabilized.  Bowel regimen     RENAL:  Follow up lytes.  Correct as needed    INFECTIOUS DISEASE: Follow up cultures.  Continue ABX for now.  Repeat culutres.      HEMATOLOGICAL:  DVT prophylaxis.  Hold Eliquis.  LMWH in am     ENDOCRINE:  Follow up FS.  Insulin protocol if needed.     MUSCULOSKELETAL:  Bed rest     A line  TLC   Freeman     Overall prognosis poor     DW Family at length            SHAUN COATES  77y  Female      Patient is a 77y old  Female who presents with a chief complaint of Pneumonthorax (16 Dec 2022 14:40)      INTERVAL HPI/OVERNIGHT EVENTS:  Febrile overnight upto 104F; requiring pressors for the BP    REVIEW OF SYSTEMS:  unobtainable       T(C): 37.3 (12-19-22 @ 12:00), Max: 40.2 (12-18-22 @ 16:00)  HR: 60 (12-19-22 @ 13:30) (60 - 160)  BP: 64/51 (12-19-22 @ 09:00) (56/32 - 165/54)  RR: 25 (12-19-22 @ 13:30) (16 - 48)  SpO2: 95% (12-19-22 @ 13:30) (52% - 100%)  Wt(kg): --Vital Signs Last 24 Hrs  T(C): 37.3 (19 Dec 2022 12:00), Max: 40.2 (18 Dec 2022 16:00)  T(F): 99.1 (19 Dec 2022 12:00), Max: 104.4 (18 Dec 2022 16:00)  HR: 60 (19 Dec 2022 13:30) (60 - 160)  BP: 64/51 (19 Dec 2022 09:00) (56/32 - 165/54)  BP(mean): 56 (19 Dec 2022 09:00) (45 - 101)  RR: 25 (19 Dec 2022 13:30) (16 - 48)  SpO2: 95% (19 Dec 2022 13:30) (52% - 100%)    Parameters below as of 19 Dec 2022 13:00  Patient On (Oxygen Delivery Method): ventilator    O2 Concentration (%): 80    PHYSICAL EXAM:  GENERAL: NAD, well-groomed, well-developed  NECK: Supple, No JVD, Normal thyroid  NERVOUS SYSTEM:  Alert & Oriented X3, Motor Strength 5/5 B/L upper and lower extremities;   CHEST/LUNG: Decreased BS on the rt side; No rales, rhonchi, wheezing, or rubs; Chest tube draining on the right side   HEART: Regular rate and rhythm; No murmurs, rubs, or gallops  ABDOMEN: Soft, Nontender, Nondistended; Bowel sounds present  EXTREMITIES:  2+ Peripheral Pulses, No clubbing, cyanosis, or edema      Consultant(s) Notes Reviewed:  [x ] YES  [ ] NO  Care Discussed with Consultants/Other Providers [ x] YES  [ ] NO    Labs reviewed   Imaging Reviewed     albuterol    90 MICROgram(s) HFA Inhaler 2 Puff(s) Inhalation every 6 hours PRN  albuterol    90 MICROgram(s) HFA Inhaler 1 Puff(s) Inhalation every 4 hours  aMIOdarone Infusion 1 mG/Min IV Continuous <Continuous>  aMIOdarone Infusion 0.5 mG/Min IV Continuous <Continuous>  aspirin  chewable 81 milliGRAM(s) Oral daily  atorvastatin 20 milliGRAM(s) Oral at bedtime  cefTRIAXone   IVPB 1000 milliGRAM(s) IV Intermittent every 24 hours  chlorhexidine 2% Cloths 1 Application(s) Topical <User Schedule>  chlorhexidine 2% Cloths 1 Application(s) Topical <User Schedule>  dexMEDEtomidine Infusion 0.2 MICROgram(s)/kG/Hr IV Continuous <Continuous>  fentaNYL    Injectable 25 MICROGram(s) IV Push every 6 hours PRN  fentaNYL   Infusion. 0.5 MICROgram(s)/kG/Hr IV Continuous <Continuous>  gabapentin 400 milliGRAM(s) Oral three times a day  lamoTRIgine 100 milliGRAM(s) Oral two times a day  methylPREDNISolone sodium succinate Injectable 60 milliGRAM(s) IV Push every 24 hours  montelukast 10 milliGRAM(s) Oral daily  norepinephrine Infusion 0.05 MICROgram(s)/kG/Min IV Continuous <Continuous>  pantoprazole   Suspension 40 milliGRAM(s) Oral daily  phenylephrine    Infusion 0.1 MICROgram(s)/kG/Min IV Continuous <Continuous>  polyethylene glycol 3350 17 Gram(s) Oral daily  potassium chloride  20 mEq/100 mL IVPB 20 milliEquivalent(s) IV Intermittent once  QUEtiapine 250 milliGRAM(s) Oral at bedtime  vasopressin Infusion 0.04 Unit(s)/Min IV Continuous <Continuous>      ASSESSMENT AND PLAN:    #Acute hypoxemic respiratory failure   #Right pneumothorax SP pig tail.  Persistent air leak and non expanding lung   #COPD exacerbation; H/O Chronic hypoxemic respiratory failure on home O2   - HOB @ 45 degrees.   - Pt was intubated due to worsening breathlessness and increased work of breathing  - wean O2   -Monitor PPL, DP, and auto PEEP.    - Start Solumedrol 60 mg Q24.   - c/w Nebs Q4 and PRN.    - DW CTS and large bore Chest tube placed superior to the previous pigtail location  - position checked       #Septic Shock / obstructive shock   - Pt was febrile to 104F  - Low BP - requiring levophed and vasopressin   - Cultures taken and pt continued on Ceftriaxone  - Procal, cultures awaited       #Afib with RVR   - pt developed RVR with a HR upto 200/min  - 5 mg lopressor stat given - HR controlled to 140's   - started amiodarone - pt reverted back to sinus rhythm with HR        CARDIOVASCULAR:  Goal directed fluid resuscitation. SP 2 liters R.  Rate control with Amiodarone.  Hold anti hypertensives.      GI: GI prophylaxis.  OG Feeding oncwe stabilized.  Bowel regimen     RENAL:  Follow up lytes.  Correct as needed    INFECTIOUS DISEASE: Follow up cultures.  Continue ABX for now.  Repeat culutres.      HEMATOLOGICAL:  DVT prophylaxis.  Hold Eliquis.  LMWH in am     ENDOCRINE:  Follow up FS.  Insulin protocol if needed.     MUSCULOSKELETAL:  Bed rest     A line  TLC   Freeman     Overall prognosis poor     DW Family at length            SHAUN COATES  77y  Female      Patient is a 77y old  Female who presents with a chief complaint of Pneumonthorax (16 Dec 2022 14:40)      INTERVAL HPI/OVERNIGHT EVENTS:  Febrile overnight upto 104F; requiring pressors for the BP    REVIEW OF SYSTEMS:  unobtainable       T(C): 37.3 (12-19-22 @ 12:00), Max: 40.2 (12-18-22 @ 16:00)  HR: 60 (12-19-22 @ 13:30) (60 - 160)  BP: 64/51 (12-19-22 @ 09:00) (56/32 - 165/54)  RR: 25 (12-19-22 @ 13:30) (16 - 48)  SpO2: 95% (12-19-22 @ 13:30) (52% - 100%)  Wt(kg): --Vital Signs Last 24 Hrs  T(C): 37.3 (19 Dec 2022 12:00), Max: 40.2 (18 Dec 2022 16:00)  T(F): 99.1 (19 Dec 2022 12:00), Max: 104.4 (18 Dec 2022 16:00)  HR: 60 (19 Dec 2022 13:30) (60 - 160)  BP: 64/51 (19 Dec 2022 09:00) (56/32 - 165/54)  BP(mean): 56 (19 Dec 2022 09:00) (45 - 101)  RR: 25 (19 Dec 2022 13:30) (16 - 48)  SpO2: 95% (19 Dec 2022 13:30) (52% - 100%)    Parameters below as of 19 Dec 2022 13:00  Patient On (Oxygen Delivery Method): ventilator    O2 Concentration (%): 80    PHYSICAL EXAM:  GENERAL: NAD, well-groomed, well-developed  NECK: Supple, No JVD, Normal thyroid  NERVOUS SYSTEM:  Alert & Oriented X3, Motor Strength 5/5 B/L upper and lower extremities;   CHEST/LUNG: Decreased BS on the rt side; No rales, rhonchi, wheezing, or rubs; Chest tube draining on the right side   HEART: Regular rate and rhythm; No murmurs, rubs, or gallops  ABDOMEN: Soft, Nontender, Nondistended; Bowel sounds present  EXTREMITIES:  2+ Peripheral Pulses, No clubbing, cyanosis, or edema      Consultant(s) Notes Reviewed:  [x ] YES  [ ] NO  Care Discussed with Consultants/Other Providers [ x] YES  [ ] NO    Labs reviewed   Imaging Reviewed     albuterol    90 MICROgram(s) HFA Inhaler 2 Puff(s) Inhalation every 6 hours PRN  albuterol    90 MICROgram(s) HFA Inhaler 1 Puff(s) Inhalation every 4 hours  aMIOdarone Infusion 1 mG/Min IV Continuous <Continuous>  aMIOdarone Infusion 0.5 mG/Min IV Continuous <Continuous>  aspirin  chewable 81 milliGRAM(s) Oral daily  atorvastatin 20 milliGRAM(s) Oral at bedtime  cefTRIAXone   IVPB 1000 milliGRAM(s) IV Intermittent every 24 hours  chlorhexidine 2% Cloths 1 Application(s) Topical <User Schedule>  chlorhexidine 2% Cloths 1 Application(s) Topical <User Schedule>  dexMEDEtomidine Infusion 0.2 MICROgram(s)/kG/Hr IV Continuous <Continuous>  fentaNYL    Injectable 25 MICROGram(s) IV Push every 6 hours PRN  fentaNYL   Infusion. 0.5 MICROgram(s)/kG/Hr IV Continuous <Continuous>  gabapentin 400 milliGRAM(s) Oral three times a day  lamoTRIgine 100 milliGRAM(s) Oral two times a day  methylPREDNISolone sodium succinate Injectable 60 milliGRAM(s) IV Push every 24 hours  montelukast 10 milliGRAM(s) Oral daily  norepinephrine Infusion 0.05 MICROgram(s)/kG/Min IV Continuous <Continuous>  pantoprazole   Suspension 40 milliGRAM(s) Oral daily  phenylephrine    Infusion 0.1 MICROgram(s)/kG/Min IV Continuous <Continuous>  polyethylene glycol 3350 17 Gram(s) Oral daily  potassium chloride  20 mEq/100 mL IVPB 20 milliEquivalent(s) IV Intermittent once  QUEtiapine 250 milliGRAM(s) Oral at bedtime  vasopressin Infusion 0.04 Unit(s)/Min IV Continuous <Continuous>      ASSESSMENT AND PLAN:    #Acute hypoxemic respiratory failure   #Right pneumothorax SP pig tail.  Persistent air leak and non expanding lung   #COPD exacerbation; H/O Chronic hypoxemic respiratory failure on home O2   - HOB @ 45 degrees.   - Pt was intubated due to worsening breathlessness and increased work of breathing  - wean O2   -Monitor PPL, DP, and auto PEEP.    - Start Solumedrol 60 mg Q24.   - c/w Nebs Q4 - albuterol and ipratropium   - DW CTS and large bore Chest tube placed (12/19) superior to the previous pigtail location  - position checked       #Septic Shock / obstructive shock   - Pt was febrile to 104F  - Low BP - requiring levophed and phenyl  - Cultures taken and pt continued on Ceftriaxone  - Procal, cultures awaited   - Pt has lt femoral CVC (12/19) and Rt Art. line (12/19)      #Afib with RVR  #Bradycardia    - pt developed RVR with a HR upto 200/min  - 5 mg lopressor stat given - HR controlled to 140's   - started amiodarone - pt reverted back to sinus rhythm with HR  - Pt developed bradycardia and an episode of asystole - non sustained   - Phenylephrine and amiodarone were on hold and levophed continued  - EP consult placed   - pt was on eliquis - withhold for today - acute state and procedures done       VTE -  on hold   Gi ppx - protonix  activity - bedrest   DW Family at length   full code              SHAUN COATES  77y  Female      Patient is a 77y old  Female who presents with a chief complaint of Pneumonthorax (16 Dec 2022 14:40)      INTERVAL HPI/OVERNIGHT EVENTS:  Febrile overnight upto 104F; requiring pressors for the BP    REVIEW OF SYSTEMS:  unobtainable     T(C): 37.3 (12-19-22 @ 12:00), Max: 40.2 (12-18-22 @ 16:00)  HR: 60 (12-19-22 @ 13:30) (60 - 160)  BP: 64/51 (12-19-22 @ 09:00) (56/32 - 165/54)  RR: 25 (12-19-22 @ 13:30) (16 - 48)  SpO2: 95% (12-19-22 @ 13:30) (52% - 100%)  Wt(kg): --Vital Signs Last 24 Hrs  T(C): 37.3 (19 Dec 2022 12:00), Max: 40.2 (18 Dec 2022 16:00)  T(F): 99.1 (19 Dec 2022 12:00), Max: 104.4 (18 Dec 2022 16:00)  HR: 60 (19 Dec 2022 13:30) (60 - 160)  BP: 64/51 (19 Dec 2022 09:00) (56/32 - 165/54)  BP(mean): 56 (19 Dec 2022 09:00) (45 - 101)  RR: 25 (19 Dec 2022 13:30) (16 - 48)  SpO2: 95% (19 Dec 2022 13:30) (52% - 100%)    Parameters below as of 19 Dec 2022 13:00  Patient On (Oxygen Delivery Method): ventilator    O2 Concentration (%): 80    PHYSICAL EXAM:  GENERAL: NAD, well-groomed, well-developed  NECK: Supple, No JVD, Normal thyroid  NERVOUS SYSTEM:  Alert & Oriented X3, Motor Strength 5/5 B/L upper and lower extremities;   CHEST/LUNG: Decreased BS on the rt side; No rales, rhonchi, wheezing, or rubs; Chest tube draining on the right side   HEART: Regular rate and rhythm; No murmurs, rubs, or gallops  ABDOMEN: Soft, Nontender, Nondistended; Bowel sounds present  EXTREMITIES:  2+ Peripheral Pulses, No clubbing, cyanosis, or edema      Consultant(s) Notes Reviewed:  [x ] YES  [ ] NO  Care Discussed with Consultants/Other Providers [ x] YES  [ ] NO    Labs reviewed   Imaging Reviewed     albuterol    90 MICROgram(s) HFA Inhaler 2 Puff(s) Inhalation every 6 hours PRN  albuterol    90 MICROgram(s) HFA Inhaler 1 Puff(s) Inhalation every 4 hours  aMIOdarone Infusion 1 mG/Min IV Continuous <Continuous>  aMIOdarone Infusion 0.5 mG/Min IV Continuous <Continuous>  aspirin  chewable 81 milliGRAM(s) Oral daily  atorvastatin 20 milliGRAM(s) Oral at bedtime  cefTRIAXone   IVPB 1000 milliGRAM(s) IV Intermittent every 24 hours  chlorhexidine 2% Cloths 1 Application(s) Topical <User Schedule>  chlorhexidine 2% Cloths 1 Application(s) Topical <User Schedule>  dexMEDEtomidine Infusion 0.2 MICROgram(s)/kG/Hr IV Continuous <Continuous>  fentaNYL    Injectable 25 MICROGram(s) IV Push every 6 hours PRN  fentaNYL   Infusion. 0.5 MICROgram(s)/kG/Hr IV Continuous <Continuous>  gabapentin 400 milliGRAM(s) Oral three times a day  lamoTRIgine 100 milliGRAM(s) Oral two times a day  methylPREDNISolone sodium succinate Injectable 60 milliGRAM(s) IV Push every 24 hours  montelukast 10 milliGRAM(s) Oral daily  norepinephrine Infusion 0.05 MICROgram(s)/kG/Min IV Continuous <Continuous>  pantoprazole   Suspension 40 milliGRAM(s) Oral daily  phenylephrine    Infusion 0.1 MICROgram(s)/kG/Min IV Continuous <Continuous>  polyethylene glycol 3350 17 Gram(s) Oral daily  potassium chloride  20 mEq/100 mL IVPB 20 milliEquivalent(s) IV Intermittent once  QUEtiapine 250 milliGRAM(s) Oral at bedtime  vasopressin Infusion 0.04 Unit(s)/Min IV Continuous <Continuous>      ASSESSMENT AND PLAN:    #Acute hypoxemic respiratory failure   #Right pneumothorax SP pig tail.  Persistent air leak and non expanding lung   #COPD exacerbation; H/O Chronic hypoxemic respiratory failure on home O2   - HOB @ 45 degrees.   - Pt was intubated due to worsening breathlessness and increased work of breathing  - wean O2   -Monitor PPL, DP, and auto PEEP.    - Start Solumedrol 60 mg Q24.   - c/w Nebs Q4 - albuterol and ipratropium   - DW CTS and large bore Chest tube placed (12/19) superior to the previous pigtail location  - position checked       #Septic Shock / obstructive shock   - Pt was febrile to 104F  - Low BP - requiring levophed and phenyl  - Cultures taken and pt continued on Ceftriaxone  - Procal, cultures awaited   - Pt has lt femoral CVC (12/19) and Rt Art. line (12/19)      #Afib with RVR  #Bradycardia    - pt developed RVR with a HR upto 200/min  - 5 mg lopressor stat given - HR controlled to 140's   - started amiodarone - pt reverted back to sinus rhythm with HR  - Pt developed bradycardia and an episode of asystole - non sustained   - Phenylephrine and amiodarone were on hold and levophed continued  - EP consult placed   - pt was on eliquis - withhold for today - acute state and procedures done     HTN  - BB - on hold   - low sodium diet   - monitor vitals     #Hx//o of multiple TIA  # h/o of chronic persistent afib  # HLD  - hold metoprolol and Eliquis and statin  - TTE (10/21/21): EF 55-60%, mod pHTN, no valvular or wall motion abn  - tele monitoring     #COPD with chronic respiratory failure   #FLAVIO  - continue nebs Q6H - albuterol and ipratropium  - methypred 60mg Q24H     #Depression/ anxiety   - hold Seroquel qhs/ ativan prn/ apple  - lamictal     #OA  - tylenol prn pain 1-3      VTE -  on hold   Gi ppx - protonix  activity - bedrest   DW Family at length   full code

## 2022-12-19 NOTE — PROCEDURAL SAFETY CHECKLIST WITH OR WITHOUT SEDATION - NSPOSTCOMMENTFT_GEN_ALL_CORE
pt rob procedure well. vss. cxr ordered.
pt rob procedure well. vss
vss. pt rob  procedure well.
Tolerated procedure well. 14Fr pigtail in place to wall suction.

## 2022-12-19 NOTE — CONSULT NOTE ADULT - ASSESSMENT
Afib w RVR, bradycardia, pause. SSS likely  AHRF 2/2 R pneumothorax  Septic shock  Hx TIA    -amio gtt  -keep K>4 and Mg >2  -wean pressors  -heparin gtt and switch ultimately to DOAC Afib w RVR, bradycardia, 10s pause. SSS likely  AHRF 2/2 R pneumothorax  Septic shock  Hx TIA  COPD    -d/c amio gtt  -keep K>4 and Mg >2  -wean pressors  -heparin gtt and switch ultimately to DOAC on discharge  -will need PPM once stabilized

## 2022-12-19 NOTE — CHART NOTE - NSCHARTNOTEFT_GEN_A_CORE
Patient was hypotensive. was given bolus and started on levophed. abg and cxr ordered. cxr showed worsening pneumothorax. Called CT surgery who evaluated chest tube, found a kink and straightened chest tube. repeat cxr on wet read shows improvement.   Patient expressed fear of being intubated. Spoke with the patients  over the phone who asked if we decide to intubate we call him before intubating.

## 2022-12-19 NOTE — PROGRESS NOTE ADULT - ASSESSMENT
ASSESSMENT:  77F PMH COPD on 3-4 L nasal cannula at home, A. fib on Eliquis, pneumothorax x2, h/o pleurodesis on the right 15 years ago, CHF, BP1, Depression, HTN, FLAVIO on CPAP, emphysema, PVD, h/o COVID requiring intubation, carotid angioplasty, cholecystectomy, TIA x3, here for evaluation of atraumatic right upper back pain and shortness of breath that began this morning upon awakening around 6 AM.  Patient denies fever, chills, worsening cough, chest pain. Patient was transferred to [facility] from [facility] with complicated PTX.    PLAN:  - Will place right sided chest tube   - Monitor vitals, afib   - Monitor O2 requirements   - Care as per primary team     GREEN TEAM SPECTRA: [id number]

## 2022-12-20 NOTE — PROGRESS NOTE ADULT - ASSESSMENT
ASSESSMENT:  77y F w/ right pneumothorax, s/p pigtail and chest tube placement       PLAN:  keep Chest tube and pigtail to suction  Daily AM cxr  monitor Chest tube and pigtail outputs  monitor for airleak  monitor O2 sat      spectra [id number]

## 2022-12-20 NOTE — PROGRESS NOTE ADULT - ASSESSMENT
IMPRESSION:    Acute hypoxemic respiratory failure   Right pneumothorax SP pig tail.  Persistent air leak and non expanding lung   Septic Shock / obstructive shock resolved   Rapid Afib now NSR   COPD exacerbation   HO Chronic hypoxemic respiratory failure on home O2     PLAN:    CNS:  SAT after CTH. EEG.  Klonopin     HEENT: Oral care.  ET care     PULMONARY:  HOB @ 45 degrees.  Vent changes:  PEEP 5.  Wean O2.  Monitor PPL, DP, and auto PEEP.  Solumedrol 60 mg Q24.  Nebs Q4 and PRN.  DW CTS,  CT Chest NC.  Might need Endobronchial valve.      CARDIOVASCULAR:  Goal directed fluid resuscitation.  Hold anti hypertensives.      GI: GI prophylaxis.  OG Feeding once stabilized.  Bowel regimen     RENAL:  Follow up lytes.  Correct as needed    INFECTIOUS DISEASE: Follow up cultures.  Continue ABX for now.  Repeat cultures      HEMATOLOGICAL:  DVT prophylaxis.  Hold Eliquis.  LMWH once stable      ENDOCRINE:  Follow up FS.  Insulin protocol if needed.     MUSCULOSKELETAL:  Bed rest     A line  TLC   Freeman     Overall prognosis poor     DW Family at length

## 2022-12-20 NOTE — PROGRESS NOTE ADULT - SUBJECTIVE AND OBJECTIVE BOX
Patient is a 77y old  Female who presents with a chief complaint of Pneumonthorax (16 Dec 2022 14:40)        Over Night Events:  Remains critically ill on MV.  Sedated.  Off pressors.          ROS:     All ROS are negative except HPI         PHYSICAL EXAM    ICU Vital Signs Last 24 Hrs  T(C): 36.3 (20 Dec 2022 08:00), Max: 38.3 (19 Dec 2022 11:00)  T(F): 97.3 (20 Dec 2022 08:00), Max: 100.9 (19 Dec 2022 11:00)  HR: 68 (20 Dec 2022 09:00) (48 - 160)  BP: 120/51 (20 Dec 2022 04:15) (120/51 - 120/51)  BP(mean): 71 (20 Dec 2022 04:15) (71 - 71)  ABP: 122/62 (20 Dec 2022 09:00) (94/54 - 186/88)  ABP(mean): 84 (20 Dec 2022 09:00) (66 - 126)  RR: 29 (20 Dec 2022 09:00) (4 - 40)  SpO2: 97% (20 Dec 2022 09:00) (52% - 100%)    O2 Parameters below as of 20 Dec 2022 09:00  Patient On (Oxygen Delivery Method): ventilator            CONSTITUTIONAL:  Ill appearing in NAD    ENT:   Airway patent,   Mouth with normal mucosa.   + ET     EYES:   Pupils equal,   Round and reactive to light.    CARDIAC:   Normal rate,   Regular rhythm.        RESPIRATORY:   No wheezing  Bilateral BS  Normal chest expansion  Not tachypneic,  No use of accessory muscles    GASTROINTESTINAL:  Abdomen soft,   Non-tender,   No guarding,   + BS    MUSCULOSKELETAL:   Range of motion is not limited,  No clubbing, cyanosis    NEUROLOGICAL:   Sedated  Follows simple commands when off seadation .    SKIN:   Skin normal color for race,   Warm and dry  No evidence of rash.    PSYCHIATRIC:   No apparent risk to self or others.      12-19-22 @ 07:01  -  12-20-22 @ 07:00  --------------------------------------------------------  IN:    Amiodarone: 66.6 mL    Dexmedetomidine: 129.9 mL    FentaNYL: 406.4 mL    FentaNYL: 303.5 mL    IV PiggyBack: 250 mL    Norepinephrine: 60.2 mL    Norepinephrine: 225.3 mL    Phenylephrine: 374 mL    Vasopressin: 120 mL  Total IN: 1935.9 mL    OUT:    Amiodarone: 0 mL    Chest Tube (mL): 150 mL    Chest Tube (mL): 38 mL    Indwelling Catheter - Urethral (mL): 860 mL  Total OUT: 1048 mL    Total NET: 887.9 mL      12-20-22 @ 07:01  -  12-20-22 @ 09:04  --------------------------------------------------------  IN:    Dexmedetomidine: 22.6 mL    FentaNYL: 49.9 mL  Total IN: 72.5 mL    OUT:    Indwelling Catheter - Urethral (mL): 40 mL    Norepinephrine: 0 mL    Vasopressin: 0 mL  Total OUT: 40 mL    Total NET: 32.5 mL          LABS:                            8.5    4.32  )-----------( 195      ( 20 Dec 2022 05:00 )             27.7                       8.5    4.32  )-----------( 195      ( 12-20 @ 05:00 )             27.7                9.4    8.08  )-----------( 237      ( 12-19 @ 05:14 )             30.1                9.2    6.33  )-----------( 204      ( 12-18 @ 23:50 )             30.6                11.0   9.85  )-----------( 270      ( 12-18 @ 17:48 )             34.6                9.6    8.76  )-----------( 238      ( 12-17 @ 22:10 )             30.5                                            12-20    137  |  106  |  30<H>  ----------------------------<  202<H>  4.8   |  21  |  1.0    Ca    7.3<L>      20 Dec 2022 05:00  Phos  2.6     12-20  Mg     2.0     12-20    TPro  6.5  /  Alb  3.0<L>  /  TBili  0.3  /  DBili  x   /  AST  2075<H>  /  ALT  1023<H>  /  AlkPhos  303<H>  12-20                                                 CARDIAC MARKERS ( 20 Dec 2022 00:20 )  x     / <0.01 ng/mL / x     / x     / x      CARDIAC MARKERS ( 19 Dec 2022 20:22 )  x     / <0.01 ng/mL / x     / x     / x                                                LIVER FUNCTIONS - ( 20 Dec 2022 05:00 )  Alb: 3.0 g/dL / Pro: 6.5 g/dL / ALK PHOS: 303 U/L / ALT: 1023 U/L / AST: 2075 U/L / GGT: x                                                  Culture - Blood (collected 18 Dec 2022 17:48)  Source: .Blood Blood  Preliminary Report (20 Dec 2022 02:02):    No growth to date.                                                   Mode: AC/ CMV (Assist Control/ Continuous Mandatory Ventilation)  RR (machine): 28  TV (machine): 350  FiO2: 100  PEEP: 5  ITime: 1  MAP: 10  PIP: 24                                      ABG - ( 20 Dec 2022 05:30 )  pH, Arterial: 7.25  pH, Blood: x     /  pCO2: 51    /  pO2: 57    / HCO3: 22    / Base Excess: -4.8  /  SaO2: 89.1                MEDICATIONS  (STANDING):  albuterol    90 MICROgram(s) HFA Inhaler 4 Puff(s) Inhalation every 6 hours  aspirin  chewable 81 milliGRAM(s) Oral daily  atorvastatin 20 milliGRAM(s) Oral at bedtime  atropine Injectable 0.6 milliGRAM(s) IV Push once  cefTRIAXone   IVPB 2000 milliGRAM(s) IV Intermittent every 24 hours  chlorhexidine 2% Cloths 1 Application(s) Topical <User Schedule>  chlorhexidine 2% Cloths 1 Application(s) Topical <User Schedule>  dexMEDEtomidine Infusion 0.2 MICROgram(s)/kG/Hr (4.54 mL/Hr) IV Continuous <Continuous>  fentaNYL   Infusion. 0.501 MICROgram(s)/kG/Hr (4.54 mL/Hr) IV Continuous <Continuous>  gabapentin 400 milliGRAM(s) Oral three times a day  ipratropium 17 MICROgram(s) HFA Inhaler 4 Puff(s) Inhalation every 6 hours  lamoTRIgine 100 milliGRAM(s) Oral two times a day  methylPREDNISolone sodium succinate Injectable 60 milliGRAM(s) IV Push every 24 hours  montelukast 10 milliGRAM(s) Oral daily  norepinephrine Infusion 0.05 MICROgram(s)/kG/Min (4.25 mL/Hr) IV Continuous <Continuous>  pantoprazole   Suspension 40 milliGRAM(s) Oral daily  polyethylene glycol 3350 17 Gram(s) Oral daily  QUEtiapine 250 milliGRAM(s) Oral at bedtime  vasopressin Infusion 0.04 Unit(s)/Min (6 mL/Hr) IV Continuous <Continuous>    MEDICATIONS  (PRN):  fentaNYL    Injectable 25 MICROGram(s) IV Push every 6 hours PRN Severe Pain (7 - 10)      New X-rays reviewed:                                                                                  ECHO    CXR interpreted by me:

## 2022-12-20 NOTE — PROGRESS NOTE ADULT - SUBJECTIVE AND OBJECTIVE BOX
SHAUN COATES  77y  Female      Patient is a 77y old  Female who presents with a chief complaint of Pneumonthorax (16 Dec 2022 14:40)      INTERVAL HPI/OVERNIGHT EVENTS:  Had an episode of hypertension/ tachycardia, which settled with increased sedation and ativan. ?possible seizures.    REVIEW OF SYSTEMS:  Unobtainable     T(C): 36.3 (12-20-22 @ 08:00), Max: 38.1 (12-19-22 @ 20:00)  HR: 64 (12-20-22 @ 12:15) (48 - 140)  BP: 92/50 (12-20-22 @ 11:30) (92/50 - 120/51)  RR: 28 (12-20-22 @ 12:15) (4 - 32)  SpO2: 98% (12-20-22 @ 12:15) (75% - 100%)  Wt(kg): --Vital Signs Last 24 Hrs  T(C): 36.3 (20 Dec 2022 08:00), Max: 38.1 (19 Dec 2022 20:00)  T(F): 97.3 (20 Dec 2022 08:00), Max: 100.6 (19 Dec 2022 20:00)  HR: 64 (20 Dec 2022 12:15) (48 - 140)  BP: 92/50 (20 Dec 2022 11:30) (92/50 - 120/51)  BP(mean): 68 (20 Dec 2022 11:30) (68 - 71)  RR: 28 (20 Dec 2022 12:15) (4 - 32)  SpO2: 98% (20 Dec 2022 12:15) (75% - 100%)    Parameters below as of 20 Dec 2022 12:00  Patient On (Oxygen Delivery Method): ventilator    O2 Concentration (%): 60    PHYSICAL EXAM:  GENERAL: Ill-appearing  NECK: Supple, No JVD, Normal thyroid  NERVOUS SYSTEM:  mild inconsistent eye opening to pain, sluggish pupillary response, 5mm b/l  CHEST/LUNG: Clear to percussion bilaterally; No rales, rhonchi, wheezing, or rubs  HEART: Regular rate and rhythm; No murmurs, rubs, or gallops  ABDOMEN: Soft, Nontender, Nondistended; Bowel sounds present  EXTREMITIES:  2+ Peripheral Pulses, No clubbing, cyanosis, or edema  SKIN: No rashes or lesions    Consultant(s) Notes Reviewed:  [x ] YES  [ ] NO  Care Discussed with Consultants/Other Providers [ x] YES  [ ] NO      Labs reviewed   Imaging Reviewed     albuterol    90 MICROgram(s) HFA Inhaler 4 Puff(s) Inhalation every 6 hours  aspirin  chewable 81 milliGRAM(s) Oral daily  atorvastatin 20 milliGRAM(s) Oral at bedtime  atropine Injectable 0.6 milliGRAM(s) IV Push once  cefTRIAXone   IVPB 2000 milliGRAM(s) IV Intermittent every 24 hours  chlorhexidine 2% Cloths 1 Application(s) Topical <User Schedule>  chlorhexidine 2% Cloths 1 Application(s) Topical <User Schedule>  clonazePAM  Tablet 0.5 milliGRAM(s) Oral daily  dexMEDEtomidine Infusion 0.2 MICROgram(s)/kG/Hr IV Continuous <Continuous>  fentaNYL    Injectable 25 MICROGram(s) IV Push every 6 hours PRN  fentaNYL   Infusion. 0.501 MICROgram(s)/kG/Hr IV Continuous <Continuous>  gabapentin 400 milliGRAM(s) Oral three times a day  ipratropium 17 MICROgram(s) HFA Inhaler 4 Puff(s) Inhalation every 6 hours  lactulose Syrup 20 Gram(s) Oral every 6 hours  lamoTRIgine 100 milliGRAM(s) Oral two times a day  methylPREDNISolone sodium succinate Injectable 60 milliGRAM(s) IV Push every 24 hours  montelukast 10 milliGRAM(s) Oral daily  norepinephrine Infusion 0.05 MICROgram(s)/kG/Min IV Continuous <Continuous>  pantoprazole   Suspension 40 milliGRAM(s) Oral daily  polyethylene glycol 3350 17 Gram(s) Oral daily  QUEtiapine 250 milliGRAM(s) Oral at bedtime  senna 2 Tablet(s) Oral at bedtime    ASSESSMENT AND PLAN:    #Acute hypoxemic respiratory failure   #Right pneumothorax SP pig tail.  Persistent air leak and non expanding lung   #COPD exacerbation; H/O Chronic hypoxemic respiratory failure on home O2   - HOB @ 45 degrees.   - Pt was intubated due to worsening breathlessness and increased work of breathing  - wean O2   -Monitor PPL, DP, and auto PEEP.    - Start Solumedrol 60 mg Q24.   - c/w Nebs Q4 - albuterol and ipratropium   - DW CTS and large bore Chest tube placed (12/19) superior to the previous pigtail location  - position checked   - Ct chest - prilim report reviewed - no changes - CT sx aware       #Septic Shock / obstructive shock   - Pt was febrile to 104F  - Low BP - requiring levophed and phenyl  - Cultures taken and pt continued on Ceftriaxone  - Procal, cultures awaited   - Pt has lt femoral CVC (12/19) and Rt Art. line (12/19)      #Afib with RVR  #Bradycardia    - pt developed RVR with a HR upto 200/min  - 5 mg lopressor stat given - HR controlled to 140's   - started amiodarone - pt reverted back to sinus rhythm with HR  - Pt developed bradycardia and an episode of asystole - non sustained   - Phenylephrine and amiodarone were on hold and levophed continued  - EP consult placed   - pt was on eliquis - withhold for today - acute state and procedures done     #? seizure episode   - c/e clonazepam - possible withdrawal   - eeg done - awaiting results     HTN  - BB - on hold   - low sodium diet   - monitor vitals     #Hx//o of multiple TIA  # h/o of chronic persistent afib  # HLD  - hold metoprolol and Eliquis and statin  - TTE (10/21/21): EF 55-60%, mod pHTN, no valvular or wall motion abn  - tele monitoring     #COPD with chronic respiratory failure   #FLAVIO  - continue nebs Q6H - albuterol and ipratropium  - methypred 60mg Q24H     #Depression/ anxiety   - hold Seroquel qhs/ ativan prn/ apple  - lamictal     #OA  - tylenol prn pain 1-3      VTE -  on hold   Gi ppx - protonix  Diet- start OG feeds - NG tube position to recheck   activity - bedrest   DW Family at length   full code                    SHAUN COATES  77y  Female      Patient is a 77y old  Female who presents with a chief complaint of Pneumonthorax (16 Dec 2022 14:40)      INTERVAL HPI/OVERNIGHT EVENTS:  Had an episode of hypertension/ tachycardia, which settled with increased sedation and ativan. ?possible seizures.    REVIEW OF SYSTEMS:  Unobtainable     T(C): 36.3 (12-20-22 @ 08:00), Max: 38.1 (12-19-22 @ 20:00)  HR: 64 (12-20-22 @ 12:15) (48 - 140)  BP: 92/50 (12-20-22 @ 11:30) (92/50 - 120/51)  RR: 28 (12-20-22 @ 12:15) (4 - 32)  SpO2: 98% (12-20-22 @ 12:15) (75% - 100%)  Wt(kg): --Vital Signs Last 24 Hrs  T(C): 36.3 (20 Dec 2022 08:00), Max: 38.1 (19 Dec 2022 20:00)  T(F): 97.3 (20 Dec 2022 08:00), Max: 100.6 (19 Dec 2022 20:00)  HR: 64 (20 Dec 2022 12:15) (48 - 140)  BP: 92/50 (20 Dec 2022 11:30) (92/50 - 120/51)  BP(mean): 68 (20 Dec 2022 11:30) (68 - 71)  RR: 28 (20 Dec 2022 12:15) (4 - 32)  SpO2: 98% (20 Dec 2022 12:15) (75% - 100%)    Parameters below as of 20 Dec 2022 12:00  Patient On (Oxygen Delivery Method): ventilator    O2 Concentration (%): 60    PHYSICAL EXAM:  GENERAL: Ill-appearing  NECK: Supple, No JVD, Normal thyroid  NERVOUS SYSTEM:  opening eyes to call and obeying commands. no focal neurological deficits   CHEST/LUNG: Clear to percussion bilaterally; No rales, rhonchi, wheezing, or rubs; Chest tube and pigtail in place   HEART: Regular rate and rhythm; No murmurs, rubs, or gallops  ABDOMEN: Soft, Nontender, Nondistended; Bowel sounds present  EXTREMITIES:  2+ Peripheral Pulses, No clubbing, cyanosis, or edema +  SKIN: No rashes or lesions    Consultant(s) Notes Reviewed:  [x ] YES  [ ] NO  Care Discussed with Consultants/Other Providers [ x] YES  [ ] NO      Labs reviewed   Imaging Reviewed     albuterol    90 MICROgram(s) HFA Inhaler 4 Puff(s) Inhalation every 6 hours  aspirin  chewable 81 milliGRAM(s) Oral daily  atorvastatin 20 milliGRAM(s) Oral at bedtime  atropine Injectable 0.6 milliGRAM(s) IV Push once  cefTRIAXone   IVPB 2000 milliGRAM(s) IV Intermittent every 24 hours  chlorhexidine 2% Cloths 1 Application(s) Topical <User Schedule>  chlorhexidine 2% Cloths 1 Application(s) Topical <User Schedule>  clonazePAM  Tablet 0.5 milliGRAM(s) Oral daily  dexMEDEtomidine Infusion 0.2 MICROgram(s)/kG/Hr IV Continuous <Continuous>  fentaNYL    Injectable 25 MICROGram(s) IV Push every 6 hours PRN  fentaNYL   Infusion. 0.501 MICROgram(s)/kG/Hr IV Continuous <Continuous>  gabapentin 400 milliGRAM(s) Oral three times a day  ipratropium 17 MICROgram(s) HFA Inhaler 4 Puff(s) Inhalation every 6 hours  lactulose Syrup 20 Gram(s) Oral every 6 hours  lamoTRIgine 100 milliGRAM(s) Oral two times a day  methylPREDNISolone sodium succinate Injectable 60 milliGRAM(s) IV Push every 24 hours  montelukast 10 milliGRAM(s) Oral daily  norepinephrine Infusion 0.05 MICROgram(s)/kG/Min IV Continuous <Continuous>  pantoprazole   Suspension 40 milliGRAM(s) Oral daily  polyethylene glycol 3350 17 Gram(s) Oral daily  QUEtiapine 250 milliGRAM(s) Oral at bedtime  senna 2 Tablet(s) Oral at bedtime    ASSESSMENT AND PLAN:    #Acute hypoxemic respiratory failure   #Right pneumothorax SP pig tail.  Persistent air leak and non expanding lung   #COPD exacerbation; H/O Chronic hypoxemic respiratory failure on home O2   - HOB @ 45 degrees.   - Pt was intubated due to worsening breathlessness and increased work of breathing  - wean O2   -Monitor PPL, DP, and auto PEEP.    - Start Solumedrol 60 mg Q24.   - c/w Nebs Q4 - albuterol and ipratropium   - DW CTS and large bore Chest tube placed (12/19) superior to the previous pigtail location  - position checked   - Ct chest - prilim report reviewed - increased pneumothorax size, tubes in place - CT sx aware       #Septic Shock / obstructive shock   - Pt was febrile to 104F  - Low BP - requiring levophed and phenyl  - Cultures taken and pt continued on Ceftriaxone  - Procal, cultures awaited - negative to today   - Pt has lt femoral CVC (12/19) and Rt Art. line (12/19)      #Afib with RVR  #Bradycardia    - pt developed RVR with a HR upto 200/min  - 5 mg lopressor stat given - HR controlled to 140's   - started amiodarone - pt reverted back to sinus rhythm with HR  - Pt developed bradycardia and an episode of asystole - non sustained   - Phenylephrine and amiodarone were on hold and levophed continued  - EP consult placed   - pt was on eliquis - withhold for today - acute state and procedures done     #? seizure episode   - c/e clonazepam - possible withdrawal (home dose 0.5 thrice daily)  - eeg done - awaiting results     HTN  - BB - on hold   - low sodium diet   - monitor vitals     #Hx//o of multiple TIA  # h/o of chronic persistent afib  # HLD  - hold metoprolol and Eliquis and statin  - TTE (10/21/21): EF 55-60%, mod pHTN, no valvular or wall motion abn  - tele monitoring     #COPD with chronic respiratory failure   #FLAVIO  - continue nebs Q6H - albuterol and ipratropium  - methypred 60mg Q24H     #Depression/ anxiety   - hold Seroquel qhs/ ativan prn/ apple  - lamictal     #OA  - tylenol prn pain 1-3      VTE -  on hold - awaiting doppler   Gi ppx - protonix  Diet- start OG feeds - NG tube position to recheck  and start feeds   activity - bedrest   DW Family at length   full code

## 2022-12-20 NOTE — CHART NOTE - NSCHARTNOTEFT_GEN_A_CORE
Patient calling to schedule her next post op appointment. Return in about 4 weeks (around 7/28/2020).    Patient is requesting a call back to discuss this.  Thank you.   was called that Patient had a bp systolic above 200 and tachy to 130s. at bedside patient had shaking of jaw and upper extremities. delivered 2 ativan and went up on her sedation. ecg showed sinus tachy. ddx seizure vs under sedation. of note an eeg was done 2 days ago, ordered lamotrigine level.

## 2022-12-20 NOTE — PROGRESS NOTE ADULT - SUBJECTIVE AND OBJECTIVE BOX
GENERAL SURGERY PROGRESS NOTE    Patient: SHAUN COATES , 77y ([birth year])Female   MRN: [id number]  Location: [facility]  A  Visit: 12-15-22 Inpatient  Date: 12-20-22 @ 04:24      Procedure/Dx/Injuries: right pneumothorax, s/p pigtail and chest tube placement     Events of past 24 hours: s/p right chest tube     PAST MEDICAL & SURGICAL HISTORY:  Hypertension      Depression      COPD (chronic obstructive pulmonary disease)      Other cardiomyopathy      Other emphysema      PVD (peripheral vascular disease)      Bipolar 1 disorder      FLAVIO on CPAP      Transient ischemic attack  x 3      Congestive heart failure      Falls      2019 novel coronavirus disease (COVID-19)      Respiratory failure requiring intubation      Afib      HLD (hyperlipidemia)      History of cholecystectomy      H/O carotid angioplasty          Vitals:   T(F): 99.5 (12-20-22 @ 04:00), Max: 100.9 (12-19-22 @ 11:00)  HR: 140 (12-20-22 @ 04:15)  BP: 64/51 (12-19-22 @ 09:00)  RR: 23 (12-20-22 @ 03:45)  SpO2: 99% (12-20-22 @ 04:15)  Mode: AC/ CMV (Assist Control/ Continuous Mandatory Ventilation), RR (machine): 28, TV (machine): 350, FiO2: 100, PEEP: 5, ITime: 1, MAP: 10, PIP: 24    Diet, NPO      Fluids:     I & O's:    12-18-22 @ 07:01  -  12-19-22 @ 07:00  --------------------------------------------------------  IN:    IV PiggyBack: 550 mL    Norepinephrine: 232.5 mL    Oral Fluid: 960 mL    Sodium Chloride 0.9% Bolus: 2000 mL  Total IN: 3742.5 mL    OUT:    Chest Tube (mL): 161 mL    Voided (mL): 1600 mL  Total OUT: 1761 mL    Total NET: 1981.5 mL        Bowel Movement: : [] YES [] NO  Flatus: : [] YES [] NO    PHYSICAL EXAM:  General: NAD, intubated  Cardiac: RRR S1, S2,   Respiratory: CTAB,  right chest tube and pigtails are to suction, +airleak  Abdomen: Soft, non-distended,       MEDICATIONS  (STANDING):  albuterol    90 MICROgram(s) HFA Inhaler 4 Puff(s) Inhalation every 6 hours  aspirin  chewable 81 milliGRAM(s) Oral daily  atorvastatin 20 milliGRAM(s) Oral at bedtime  atropine Injectable 0.6 milliGRAM(s) IV Push once  cefTRIAXone   IVPB 2000 milliGRAM(s) IV Intermittent every 24 hours  chlorhexidine 2% Cloths 1 Application(s) Topical <User Schedule>  chlorhexidine 2% Cloths 1 Application(s) Topical <User Schedule>  dexMEDEtomidine Infusion 0.2 MICROgram(s)/kG/Hr (4.54 mL/Hr) IV Continuous <Continuous>  fentaNYL   Infusion. 0.501 MICROgram(s)/kG/Hr (4.54 mL/Hr) IV Continuous <Continuous>  gabapentin 400 milliGRAM(s) Oral three times a day  ipratropium 17 MICROgram(s) HFA Inhaler 4 Puff(s) Inhalation every 6 hours  lamoTRIgine 100 milliGRAM(s) Oral two times a day  LORazepam   Injectable 2 milliGRAM(s) IV Push once  methylPREDNISolone sodium succinate Injectable 60 milliGRAM(s) IV Push every 24 hours  montelukast 10 milliGRAM(s) Oral daily  norepinephrine Infusion 0.05 MICROgram(s)/kG/Min (4.25 mL/Hr) IV Continuous <Continuous>  pantoprazole   Suspension 40 milliGRAM(s) Oral daily  polyethylene glycol 3350 17 Gram(s) Oral daily  QUEtiapine 250 milliGRAM(s) Oral at bedtime  vasopressin Infusion 0.04 Unit(s)/Min (6 mL/Hr) IV Continuous <Continuous>    MEDICATIONS  (PRN):  fentaNYL    Injectable 25 MICROGram(s) IV Push every 6 hours PRN Severe Pain (7 - 10)      DVT PROPHYLAXIS:   GI PROPHYLAXIS: pantoprazole   Suspension 40 milliGRAM(s) Oral daily    ANTICOAGULATION:   ANTIBIOTICS:  cefTRIAXone   IVPB 2000 milliGRAM(s)      LAB/STUDIES:  Labs:  CAPILLARY BLOOD GLUCOSE                              9.4    8.08  )-----------( 237      ( 19 Dec 2022 05:14 )             30.1       Auto Neutrophil %: 78.8 % (12-19-22 @ 05:14)  Auto Immature Granulocyte %: 0.2 % (12-19-22 @ 05:14)    12-19    137  |  104  |  28<H>  ----------------------------<  186<H>  3.4<L>   |  23  |  1.0      Calcium, Total Serum: 7.1 mg/dL (12-19-22 @ 05:14)      LFTs:             6.2  | 0.4  | 35       ------------------[280     ( 19 Dec 2022 05:14 )  3.0  | x    | 17          Lipase:x      Amylase:x         Blood Gas Arterial, Lactate: 1.10 mmol/L (12-19-22 @ 11:19)  Blood Gas Arterial, Lactate: 2.60 mmol/L (12-19-22 @ 09:49)  Blood Gas Arterial, Lactate: 0.90 mmol/L (12-19-22 @ 00:02)  Lactate, Blood: 1.6 mmol/L (12-18-22 @ 17:45)  Blood Gas Arterial, Lactate: 0.90 mmol/L (12-17-22 @ 05:29)    ABG - ( 19 Dec 2022 11:19 )  pH: 7.28  /  pCO2: 49    /  pO2: 161   / HCO3: 23    / Base Excess: -3.8  /  SaO2: 100.0       ABG - ( 19 Dec 2022 09:49 )  pH: 7.24  /  pCO2: 51    /  pO2: 37    / HCO3: 22    / Base Excess: -5.5  /  SaO2: 60.0        ABG - ( 19 Dec 2022 00:02 )  pH: 7.33  /  pCO2: 44    /  pO2: 71    / HCO3: 23    / Base Excess: -2.7  /  SaO2: 94.0      Coags:    CARDIAC MARKERS ( 20 Dec 2022 00:20 )  x     / <0.01 ng/mL / x     / x     / x      CARDIAC MARKERS ( 19 Dec 2022 20:22 )  x     / <0.01 ng/mL / x     / x     / x          Serum Pro-Brain Natriuretic Peptide: 592 pg/mL (12-15-22 @ 11:30)    Culture - Blood (collected 18 Dec 2022 17:48)  Source: .Blood Blood  Preliminary Report (20 Dec 2022 02:02):    No growth to date.      IMAGING:  < from: Xray Chest 1 View- PORTABLE-Urgent (Xray Chest 1 View- PORTABLE-Urgent .) (12.19.22 @ 20:02) >    Impression:    Decreased right pneumothorax, bilateral parenchymal opacities.  Stable support devices.    < end of copied text >      ACCESS/ DEVICES:  [ ] Peripheral IV  [ ] Central Venous Line	[ ] R	[ ] L	[ ] IJ	[ ] Fem	[ ] SC	Placed:   [ ] Arterial Line		[ ] R	[ ] L	[ ] Fem	[ ] Rad	[ ] Ax	Placed:   [ ] PICC:					[ ] Mediport  [ ] Urinary Catheter,  Date Placed:   [x ] Chest tube: [x ] Right, [ ] Left  [ ] ISAIAS/Michele Drains

## 2022-12-21 NOTE — PROCEDURE NOTE - NSPROCNAME_GEN_A_CORE
Central Line Insertion
Arterial Puncture/Cannulation
Midline Insertion
Chest Tube
Central Line Insertion

## 2022-12-21 NOTE — DIETITIAN INITIAL EVALUATION ADULT - ENTERAL
- Given BMI, recommend high-protein hypocaloric feeds with formula Peptamen Intense VHP. Start at goal 45ml/hr (titration not needed given tolerance to current goal rate). This will provide 1080kcal, 99g protein, 896ml free water.   - bowel regimen

## 2022-12-21 NOTE — PROGRESS NOTE ADULT - SUBJECTIVE AND OBJECTIVE BOX
GENERAL SURGERY PROGRESS NOTE    Patient: SHAUN COATES , 77y ([birth year])Female   MRN: [id number]  Location: [facility]  A  Visit: 12-15-22 Inpatient  Date: 12-21-22 @ 02:44    Procedure/Dx/Injuries: right pneumothorax, s/p pigtail and chest tube placement     PAST MEDICAL & SURGICAL HISTORY:  Hypertension  Depression  COPD (chronic obstructive pulmonary disease)  Other cardiomyopathy  Other emphysema  PVD (peripheral vascular disease)  Bipolar 1 disorder  FLAVIO on CPAP  Transient ischemic attack  x 3  Congestive heart failure  Falls  2019 novel coronavirus disease (COVID-19)  Respiratory failure requiring intubatio  Afib  HLD (hyperlipidemia)  History of cholecystectom  H/O carotid angioplasty      Vitals:   T(F): 98.6 (12-21-22 @ 00:00), Max: 99.5 (12-20-22 @ 04:00)  HR: 62 (12-21-22 @ 02:30)  BP: 92/50 (12-20-22 @ 11:30)  RR: 24 (12-21-22 @ 02:30)  SpO2: 100% (12-21-22 @ 02:30)  Mode: AC/ CMV (Assist Control/ Continuous Mandatory Ventilation), RR (machine): 28, TV (machine): 350, FiO2: 60, PEEP: 5, ITime: 1, MAP: 10, PIP: 22    Diet, NPO with Tube Feed:   Tube Feeding Modality: Orogastric  Jevity 1.2 Wilver  Total Volume for 24 Hours (mL): 1320  Continuous  Starting Tube Feed Rate mL per Hour: 10  Increase Tube Feed Rate by (mL): 10     Every 4 hours  Until Goal Tube Feed Rate (mL per Hour): 55  Tube Feed Duration (in Hours): 24  Tube Feed Start Time: 18:00  Free Water Flush  Bolus   Total Volume per Flush (mL): 200   Frequency: Every 6 Hours      Fluids:     I & O's:    12-19-22 @ 07:01  -  12-20-22 @ 07:00  --------------------------------------------------------  IN:    Amiodarone: 66.6 mL    Dexmedetomidine: 129.9 mL    FentaNYL: 406.4 mL    FentaNYL: 303.5 mL    IV PiggyBack: 250 mL    Norepinephrine: 60.2 mL    Norepinephrine: 225.3 mL    Phenylephrine: 374 mL    Vasopressin: 120 mL  Total IN: 1935.9 mL    OUT:    Amiodarone: 0 mL    Chest Tube (mL): 150 mL    Chest Tube (mL): 38 mL    Indwelling Catheter - Urethral (mL): 860 mL  Total OUT: 1048 mL    Total NET: 887.9 m      Bowel Movement: : [] YES [] NO  Flatus: : [] YES [] NO    PHYSICAL EXAM:  General: NAD, intubated  Cardiac: RRR S1, S2,   Respiratory: CTAB,  right chest tube and pigtails are to suction, +airleak  Abdomen: Soft, non-distended,     MEDICATIONS  (STANDING):  albuterol    90 MICROgram(s) HFA Inhaler 4 Puff(s) Inhalation every 6 hours  aspirin  chewable 81 milliGRAM(s) Oral daily  atorvastatin 20 milliGRAM(s) Oral at bedtime  atropine Injectable 0.6 milliGRAM(s) IV Push once  cefTRIAXone   IVPB 2000 milliGRAM(s) IV Intermittent every 24 hours  chlorhexidine 2% Cloths 1 Application(s) Topical <User Schedule>  chlorhexidine 2% Cloths 1 Application(s) Topical <User Schedule>  clonazePAM  Tablet 0.5 milliGRAM(s) Oral daily  dexMEDEtomidine Infusion 0.2 MICROgram(s)/kG/Hr (4.54 mL/Hr) IV Continuous <Continuous>  fentaNYL   Infusion. 0.501 MICROgram(s)/kG/Hr (4.54 mL/Hr) IV Continuous <Continuous>  gabapentin 400 milliGRAM(s) Oral three times a day  ipratropium 17 MICROgram(s) HFA Inhaler 4 Puff(s) Inhalation every 6 hours  lactulose Syrup 20 Gram(s) Oral every 6 hours  lamoTRIgine 100 milliGRAM(s) Oral two times a day  methylPREDNISolone sodium succinate Injectable 60 milliGRAM(s) IV Push every 24 hours  midazolam Infusion 0.02 mG/kG/Hr (1.81 mL/Hr) IV Continuous <Continuous>  montelukast 10 milliGRAM(s) Oral daily  norepinephrine Infusion 0.05 MICROgram(s)/kG/Min (4.25 mL/Hr) IV Continuous <Continuous>  pantoprazole   Suspension 40 milliGRAM(s) Oral daily  polyethylene glycol 3350 17 Gram(s) Oral daily  QUEtiapine 250 milliGRAM(s) Oral at bedtime  senna 2 Tablet(s) Oral at bedtime    MEDICATIONS  (PRN):  fentaNYL    Injectable 25 MICROGram(s) IV Push every 6 hours PRN Severe Pain (7 - 10)      DVT PROPHYLAXIS:   GI PROPHYLAXIS: pantoprazole   Suspension 40 milliGRAM(s) Oral daily    ANTICOAGULATION:   ANTIBIOTICS:  cefTRIAXone   IVPB 2000 milliGRAM(s)    LAB/STUDIES:  Labs:  CAPILLARY BLOOD GLUCOSE      POCT Blood Glucose.: 137 mg/dL (20 Dec 2022 14:12)                          8.5    4.32  )-----------( 195      ( 20 Dec 2022 05:00 )             27.7       Auto Neutrophil %: 85.7 % (12-20-22 @ 05:00)  Auto Immature Granulocyte %: 0.5 % (12-20-22 @ 05:00)    12-20    137  |  106  |  30<H>  ----------------------------<  202<H>  4.8   |  21  |  1.0      Calcium, Total Serum: 7.3 mg/dL (12-20-22 @ 05:00)      LFTs:             6.5  | 0.3  | 2075     ------------------[303     ( 20 Dec 2022 05:00 )  3.0  | x    | 1023        Lipase:x      Amylase:x         Blood Gas Arterial, Lactate: 0.80 mmol/L (12-20-22 @ 12:44)  Blood Gas Arterial, Lactate: 1.40 mmol/L (12-20-22 @ 05:30)  Blood Gas Arterial, Lactate: 1.00 mmol/L (12-20-22 @ 03:10)  Blood Gas Arterial, Lactate: 1.10 mmol/L (12-19-22 @ 11:19)  Blood Gas Arterial, Lactate: 2.60 mmol/L (12-19-22 @ 09:49)  Blood Gas Arterial, Lactate: 0.90 mmol/L (12-19-22 @ 00:02)  Lactate, Blood: 1.6 mmol/L (12-18-22 @ 17:45)    ABG - ( 20 Dec 2022 12:44 )  pH: 7.24  /  pCO2: 57    /  pO2: 89    / HCO3: 24    / Base Excess: -3.2  /  SaO2: 98.6            ABG - ( 20 Dec 2022 05:30 )  pH: 7.25  /  pCO2: 51    /  pO2: 57    / HCO3: 22    / Base Excess: -4.8  /  SaO2: 89.1      ABG - ( 20 Dec 2022 03:10 )  pH: 7.24  /  pCO2: 50    /  pO2: 88    / HCO3: 21    / Base Excess: -6.3  /  SaO2: 98.2      Coags:    CARDIAC MARKERS ( 20 Dec 2022 00:20 )  x     / <0.01 ng/mL / x     / x     / x      CARDIAC MARKERS ( 19 Dec 2022 20:22 )  x     / <0.01 ng/mL / x     / x     / x          Serum Pro-Brain Natriuretic Peptide: 592 pg/mL (12-15-22 @ 11:30)  Culture - Blood (collected 19 Dec 2022 12:20)  Source: .Blood Blood  Preliminary Report (20 Dec 2022 23:01):    No growth to date.    Culture - Blood (collected 18 Dec 2022 17:48)  Source: .Blood Blood  Preliminary Report (20 Dec 2022 02:02):    No growth to date.      IMAGING:  < from: Xray Chest 1 View-PORTABLE IMMEDIATE (Xray Chest 1 View-PORTABLE IMMEDIATE .) (12.20.22 @ 18:05) >  Impression:    Enteric tube extends below diaphragm.  Unchanged right pneumothorax, bilateral parenchymal opacities.    --- End of Report ---    < end of copied text >  < from: CT Chest No Cont (12.20.22 @ 11:24) >  IMPRESSION: Since 12/15/2022    Decreasing large right tension pneumothorax, with lung collapse as above.   Decrease in leftward mediastinal shift. Interval placement of right   pigtail catheter, which terminates in the right upper lung.    Emphysematous changes of the left lung with interstitial edema and lower   lobe atelectatic changes.    Significant right-sided subcutaneous soft tissue emphysematous changes.    Multinodular thyroid gland, one of which is calcified (5-14). Recommend   dedicated ultrasound of the thyroid as an outpatient if clinically   indicated.    < end of copied text >      ACCESS/ DEVICES:  [ x] Peripheral IV  [ ] Central Venous Line	[ ] R	[ ] L	[ ] IJ	[ ] Fem	[ ] SC	Placed:   [ ] Arterial Line		[ ] R	[ ] L	[ ] Fem	[ ] Rad	[ ] Ax	Placed:   [ ] PICC:					[ ] Mediport  [ ] Urinary Catheter,  Date Placed:   [ x] Chest tube: [ x] Right, [ ] Left  [ ] ISAIAS/Michele Drains

## 2022-12-21 NOTE — PROCEDURE NOTE - NSASSISTBY_GEN_A_CORE
Myself/NP
Fellow
_________________________________________________________________________________________  ========>>  M E D I C A L   A T T E N D I N G    F O L L O W  U P  N O T E  <<=========  -----------------------------------------------------------------------------------------------------    - Patient seen and examined by me approximately thirty minutes ago.  - In summary, patient is a 88y year old woman who originally presented with UTI / pyelonephritis, developed Bacteremia, then had acute respiratory failure, post Bipap, and now on High flow out of the ICU.  - Patient today overall doing ok, comfortable, eating OK.  doing well now off High flow and on O2 via NC, having increased urination post lasix, eating well    ==================>> MEDICATIONS <<====================    ALBUTerol/ipratropium for Nebulization 3 milliLiter(s) Nebulizer every 6 hours  aspirin enteric coated 81 milliGRAM(s) Oral daily  fenofibrate Tablet 145 milliGRAM(s) Oral daily  heparin  Injectable 5000 Unit(s) SubCutaneous every 8 hours  insulin glargine Injectable (LANTUS) 5 Unit(s) SubCutaneous at bedtime  insulin lispro (HumaLOG) corrective regimen sliding scale   SubCutaneous three times a day before meals  insulin lispro (HumaLOG) corrective regimen sliding scale   SubCutaneous at bedtime  insulin lispro Injectable (HumaLOG) 5 Unit(s) SubCutaneous three times a day before meals  metoprolol     tartrate 50 milliGRAM(s) Oral every 12 hours  NIFEdipine XL 30 milliGRAM(s) Oral daily  NIFEdipine XL 60 milliGRAM(s) Oral at bedtime  pantoprazole    Tablet 40 milliGRAM(s) Oral before breakfast  prednisoLONE acetate 1% Suspension 1 Drop(s) Both EYES three times a day  sodium chloride 0.65% Nasal 1 Spray(s) Both Nostrils three times a day    MEDICATIONS  (PRN):  acetaminophen   Tablet 650 milliGRAM(s) Oral every 6 hours PRN For Temp greater than 38 C (100.4 F)  acetaminophen   Tablet. 650 milliGRAM(s) Oral every 6 hours PRN mild to moderate pain  melatonin 3 milliGRAM(s) Oral at bedtime PRN Sleep    ==================>> REVIEW OF SYSTEM <<=================    GEN: no fever, no chills, no pain  RESP: no SOB now cough, no sputum  CVS: no chest pain, no palpitations, no edema  GI: no abdominal pain, no nausea, no constipation, no diarrhea  : no dysuria, no frequency, no hematuria  Neuro: no headache, no dizziness  Derm : no itching, no rash    ==================>> VITAL SIGNS <<==================    Vital Signs Last 24 Hrs  T(C): 36.5 (11-02-17 @ 13:26)  T(F): 97.7 (11-02-17 @ 13:26), Max: 98.2 (11-02-17 @ 01:26)  HR: 82 (11-02-17 @ 13:26) (8 - 92)  BP: 153/81 (11-02-17 @ 13:26)  BP(mean): --  RR: 18 (11-02-17 @ 13:26) (18 - 20)  SpO2: 100% (11-02-17 @ 13:26) (96% - 100%)      POCT Blood Glucose.: 282 mg/dL (02 Nov 2017 12:31)  POCT Blood Glucose.: 177 mg/dL (02 Nov 2017 08:59)  POCT Blood Glucose.: 73 mg/dL (01 Nov 2017 21:41)  POCT Blood Glucose.: 169 mg/dL (01 Nov 2017 18:04)    ==================>> PHYSICAL EXAM <<=================    GEN: A&O X 3 , NAD , comfortable in bed, on High flow, eating   HEENT: NCAT, PERRL, MMM, hearing intact  Neck: supple , no JVD  CVS: S1S2 , regular , No M/R/G appreciated  PULM: CTA B/L,  no W/R/R appreciated, decreased breath sounds  ABD.: soft. non tender, non distended,  bowel sounds present  Extrem: intact pulses , no edema   PSYCH : normal mood,  not anxious     ==================>> LAB AND IMAGING <<==================                                        9.5    5.72  )-----------( 175      ( 01 Nov 2017 07:10 )             29.5        Hemoglobin:   9.5  (11-01 @ 07:10)   Hemoglobin:   10.2  (10-31 @ 06:40)   Hemoglobin:   10.0  (10-30 @ 06:30)   Hemoglobin:   10.1  (10-29 @ 04:30)       135  |  98  |  32<H>  ----------------------------<  263<H>  3.9   |  26  |  0.90    Ca    8.3<L>      01 Nov 2017 07:10  Phos  2.6     11-01  Mg     1.7     11-01    _______________________  C U L T U R E S :    Culture - Blood (collected 28 Oct 2017 04:19)  Source: BLOOD  Preliminary Report (30 Oct 2017 04:18):    NO ORGANISMS ISOLATED    NO ORGANISMS ISOLATED AT 48 HRS.    Culture - Urine (collected 25 Oct 2017 20:04)  Source: URINE MIDSTREAM  Preliminary Report (26 Oct 2017 10:56):    GNR^Gram Neg Rods    COLONY COUNT: > = 100,000 CFU/ML  Final Report (29 Oct 2017 15:05):    RESULT CALLED TO: THELMA SALINAS MD./Y    DATE / TIME CALLED: 10/27/17 1215    CALLED BY: JEFE MORE                  *******************************                  * This is an appended result. *                  *******************************    A prior result that was reported as final has been changed.  Organism: E.COLI ESBL POSITIVE  Escherichia coli (29 Oct 2017 15:05)  Organism: Escherichia coli  COLONY COUNT: > = 100,000 CFU/ML (29 Oct 2017 15:05)    Sensitivities:      -  Amikacin: S <=16 BRADEN      -  Ampicillin: S <=8 BRADEN      -  Ampicillin/Sulbactam: S <=8/4 BRADEN      -  Aztreonam: I 8 BRADEN      -  Cefazolin: S <=8 BRADEN      -  Cefepime: S <=4 BRADEN      -  Cefoxitin: S <=8 BRADEN      -  Ceftazidime: S <=1 BRADEN      -  Ceftriaxone: S <=1 BRADEN      -  Ciprofloxacin: R >2 BRADEN      -  Ertapenem: S <=1 BRADEN      -  Gentamicin: S <=4 BRADEN      -  Imipenem: S <=1 BRADEN      -  Levofloxacin: R >4 BRADEN      -  Meropenem: S <=1 BRADEN      -  Nitrofurantoin: S <=32 BRADEN      -  Piperacillin/Tazobactam: S <=16 BRADEN      -  Tigecycline: S <=2 BRADEN      -  Tobramycin: S <=4 BRADEN      -  Trimethoprim/Sulfamethoxazole: R >2/38 BRADEN      Method Type: MICROSCAN NEG URINE COMBO 61  Organism: E.COLI ESBL POSITIVE  COLONY COUNT: > = 100,000 CFU/ML (27 Oct 2017 12:15)    Sensitivities:      -  Amikacin: S <=16 BRADEN      -  Ampicillin: R >16 BRADEN      -  Ampicillin/Sulbactam: R <=8/4 BRADEN      -  Aztreonam: R 16 BRADEN      -  Cefazolin: R >16 BRADEN      -  Cefepime: R >16 BRADEN      -  Cefoxitin: S <=8 BRADEN      -  Ceftazidime: R 8 BRADEN      -  Ceftriaxone: R      -  Ciprofloxacin: R >2 BRADEN      -  Ertapenem: S <=1 BRADEN      -  Gentamicin: S <=4 BRADEN      -  Imipenem: S <=1 BRADEN      -  Levofloxacin: R >4 BRADEN      -  Meropenem: S <=1 BRADEN      -  Nitrofurantoin: S <=32 BRADEN      -  Piperacillin/Tazobactam: R <=16 BRADEN      -  Tigecycline: S <=2 BRADEN      -  Tobramycin: S <=4 BRADEN      -  Trimethoprim/Sulfamethoxazole: S <=2/38 BRADEN      Method Type: MICROSCAN NEG URINE COMBO 61    Culture - Blood (collected 25 Oct 2017 19:40)  Source: BLOOD VENOUS  Preliminary Report (27 Oct 2017 12:33):    EC^Escherichia coli    Culture - Blood (collected 25 Oct 2017 19:40)  Source: BLOOD PERIPHERAL  Final Report (28 Oct 2017 11:53):    RESULT CALLED TO: BYRON LUX    DATE / TIME CALLED: 10/28/17 1151    CALLED BY: KIRT MATIAS  Final Report (26 Oct 2017 07:17):    ***Blood Panel PCR results on this specimen are available    approximately 3 hours after the Gram stain result***    Gram stain, PCR, and/or culture results may not always    correspond due to difference in methodologies    ------------------------------------------------------------    This PCR assay was performed using [employer].  The    following targets are tested for:  Enterococcus, vancomycin    resistant enterococci, Listeria monocytogenes,  coagulase    negative staphylococci, S. aureus, methicillin resistant S.    aureus, Streptococcus agalactiae (Group B), S. pneumoniae,    S. pyogenes (Group A), Acinetobacter baumannii, Enterobacter    cloacae, E. coli, Klebsiella oxytoca, K. pneumoniae, Proteus    sp., Serratia marcescens, Haemophilus influenzae, Neisseria    meningitidis, Pseudomonas aeruginosa, Candida albicans, C.    glabrata, C. krusei, C. parapsilosis, C. tropicalis and the    KPC resistance gene.  Organism: BLOOD CULTURE PCR  E.COLI ESBL POSITIVE (28 Oct 2017 11:53)  Organism: E.COLI ESBL POSITIVE (28 Oct 2017 11:53)    Sensitivities:      -  Amikacin: S <=16 BRADEN      -  Ampicillin: R >16 BRADEN      -  Ampicillin/Sulbactam: R >16/8 BRADEN      -  Aztreonam: R >16 BRADEN      -  Cefazolin: R >16 BRADEN      -  Cefepime: R >16 BRADEN      -  Cefoxitin: S <=8 BRADEN      -  Ceftazidime: R >16 BRADEN      -  Ceftriaxone: R      -  Ciprofloxacin: R >2 BRADEN      -  Ertapenem: S <=1 BRADEN      -  Gentamicin: S <=4 BRADEN      -  Imipenem: S <=1 BRADEN      -  Levofloxacin: R >4 BRADEN      -  Meropenem: S <=1 BRADEN      -  Piperacillin/Tazobactam: R 64 BRADEN      -  Tigecycline: S <=2 BRADEN      -  Tobramycin: R >8 BRADEN      -  Trimethoprim/Sulfamethoxazole: R >2/38 BRADEN      Method Type: evocatalCAN NEG URINE COMBO 61  Organism: BLOOD CULTURE PCR  ***Blood Panel PCR results on this specimen are available  approximately 3 hours after the Gram stain result***  Gram stain, PCR, and/or culture results may not always  correspond due to difference in methodologies  ------------------------------------------------------------  This PCR assay was performed using [employer].  The  following targets are tested for:  Enterococcus, vancomycin  resistant enterococci, Listeria monocytogenes,  coagulase  negative staphylococci, S. aureus, methicillin resistant S.  aureus, Streptococcus agalactiae (Group B), S. pneumoniae,  S. pyogenes (Group A), Acinetobacter baumannii, Enterobacter  cloacae, E. coli, Klebsiella oxytoca, K. pneumoniae, Proteus  sp., Serratia marcescens, Haemophilus influenzae, Neisseria  meningitidis, Pseudomonas aeruginosa, Candida albicans, C.  glabrata, C. krusei, C. parapsilosis, C. tropicalis and the  KPC resistance gene. (27 Oct 2017 09:15)    Sensitivities:      -  Escherichia coli: + DETECT BRADEN      Method Type: PCR    ___________________________________________________________________________________  ===============>>  A S S E S S M E N T   A N D   P L A N <<===============  ------------------------------------------------------------------------------------------    **UTI/ Pyelonephritis/ ESBL bacteremia  ---continue Meropenem per ID >> til 11-10-17  ---ID F/U appreciated  ---monitor vitals, labs closely    **Acute hypoxemic respiratory failure on High flow, in pt with emphysema and lung cancer  ---wean off O2 as able  ---nebs PRN  ---lasix as needed  ---PT / OOB as able    **Lung cancer, with recurrence  ---pt follows with oncologist at [facility]  ---recurrence is known and pt completed radiation therapy to them recently  ---pt to follow up post discharge    **Diabetes II, stable  ---monitor finger sticks closely  ---Continue Insulin regimen as above monitor  ---Diabetic diet  ---A1c 7.5    **Hypertension h/o  ---Continue with antihypertensive medications as above  ---DASH diet   ---close monitoring of vitals    **Anemia, overall stale  ---monitor CBC    -GI/DVT Prophylaxis.  - PT, OOB  --------------------------------------------  Case discussed with pt, RN  Education given on plan of care, increase activity, deep breathing / IS, Nutrition  ___________________________  HLima Ferrell D.O.  Pager: [phone]
Dr. Cabrera/Resident
Resident

## 2022-12-21 NOTE — DIETITIAN INITIAL EVALUATION ADULT - NSFNSGIIOFT_GEN_A_CORE
I&O's Detail    20 Dec 2022 07:01  -  21 Dec 2022 07:00  --------------------------------------------------------  IN:    Dexmedetomidine: 108.5 mL    FentaNYL: 885.8 mL    Jevity 1.2: 210 mL    Midazolam: 25.2 mL  Total IN: 1229.5 mL    OUT:    Chest Tube (mL): 90 mL    Chest Tube (mL): 19 mL    Indwelling Catheter - Urethral (mL): 1075 mL    Norepinephrine: 0 mL    Vasopressin: 0 mL  Total OUT: 1184 mL    Total NET: 45.5 mL

## 2022-12-21 NOTE — PROGRESS NOTE ADULT - SUBJECTIVE AND OBJECTIVE BOX
Patient is a 77y old  Female who presents with a chief complaint of Pneumonthorax (16 Dec 2022 14:40)        Over Night Events:  remains critically ill on MV.  Sedated.  Off pressors.          ROS:     All ROS are negative except HPI         PHYSICAL EXAM    ICU Vital Signs Last 24 Hrs  T(C): 36.4 (21 Dec 2022 08:00), Max: 37 (21 Dec 2022 00:00)  T(F): 97.5 (21 Dec 2022 08:00), Max: 98.6 (21 Dec 2022 00:00)  HR: 110 (21 Dec 2022 08:30) (54 - 116)  BP: 92/50 (20 Dec 2022 11:30) (92/50 - 92/50)  BP(mean): 68 (20 Dec 2022 11:30) (68 - 68)  ABP: 146/66 (21 Dec 2022 08:30) (102/54 - 186/82)  ABP(mean): 96 (21 Dec 2022 08:30) (32 - 120)  RR: 28 (21 Dec 2022 08:30) (11 - 34)  SpO2: 97% (21 Dec 2022 08:30) (84% - 100%)    O2 Parameters below as of 21 Dec 2022 08:30      O2 Concentration (%): 100        CONSTITUTIONAL:  Ill appearing in NAD    ENT:   Airway patent,   Mouth with normal mucosa.       EYES:   Pupils equal,   Round and reactive to light.    CARDIAC:   Normal rate,   Regular rhythm.        RESPIRATORY:   No wheezing  Bilateral BS  Normal chest expansion  Not tachypneic,  No use of accessory muscles    GASTROINTESTINAL:  Abdomen soft,   Non-tender,   No guarding,   + BS    MUSCULOSKELETAL:   Range of motion is not limited,  No clubbing, cyanosis    NEUROLOGICAL:   Sedated     SKIN:   Skin normal color for race,   No evidence of rash.    PSYCHIATRIC:   Sedated   No apparent risk to self or others.        12-20-22 @ 07:01  -  12-21-22 @ 07:00  --------------------------------------------------------  IN:    Dexmedetomidine: 108.5 mL    FentaNYL: 885.8 mL    Jevity 1.2: 210 mL    Midazolam: 25.2 mL  Total IN: 1229.5 mL    OUT:    Chest Tube (mL): 90 mL    Chest Tube (mL): 19 mL    Indwelling Catheter - Urethral (mL): 1075 mL    Norepinephrine: 0 mL    Vasopressin: 0 mL  Total OUT: 1184 mL    Total NET: 45.5 mL      12-21-22 @ 07:01  -  12-21-22 @ 09:20  --------------------------------------------------------  IN:    FentaNYL: 58.9 mL    Jevity 1.2: 70 mL    Midazolam: 8.1 mL  Total IN: 137 mL    OUT:    Indwelling Catheter - Urethral (mL): 132 mL    Norepinephrine: 0 mL  Total OUT: 132 mL    Total NET: 5 mL          LABS:                            6.9    4.55  )-----------( 152      ( 21 Dec 2022 05:25 )             22.8                         6.9    4.55  )-----------( 152      ( 12-21 @ 05:25 )             22.8                8.5    4.32  )-----------( 195      ( 12-20 @ 05:00 )             27.7                9.4    8.08  )-----------( 237      ( 12-19 @ 05:14 )             30.1                9.2    6.33  )-----------( 204      ( 12-18 @ 23:50 )             30.6                11.0   9.85  )-----------( 270      ( 12-18 @ 17:48 )             34.6       '                                   12-21    142  |  114<H>  |  33<H>  ----------------------------<  120<H>  4.7   |  22  |  0.9    Ca    7.1<L>      21 Dec 2022 05:25  Phos  1.1     12-21  Mg     2.2     12-21    TPro  5.2<L>  /  Alb  2.8<L>  /  TBili  <0.2  /  DBili  x   /  AST  982<H>  /  ALT  761<H>  /  AlkPhos  235<H>  12-21                                                 CARDIAC MARKERS ( 20 Dec 2022 00:20 )  x     / <0.01 ng/mL / x     / x     / x      CARDIAC MARKERS ( 19 Dec 2022 20:22 )  x     / <0.01 ng/mL / x     / x     / x                                                LIVER FUNCTIONS - ( 21 Dec 2022 05:25 )  Alb: 2.8 g/dL / Pro: 5.2 g/dL / ALK PHOS: 235 U/L / ALT: 761 U/L / AST: 982 U/L / GGT: x                                                  Culture - Blood (collected 19 Dec 2022 12:20)  Source: .Blood Blood  Preliminary Report (20 Dec 2022 23:01):    No growth to date.    Culture - Blood (collected 18 Dec 2022 17:48)  Source: .Blood Blood  Preliminary Report (20 Dec 2022 02:02):    No growth to date.                                                   Mode: AC/ CMV (Assist Control/ Continuous Mandatory Ventilation)  RR (machine): 28  TV (machine): 350  FiO2: 60  PEEP: 5  ITime: 1  MAP: 12  PIP: 26                                      ABG - ( 21 Dec 2022 03:26 )  pH, Arterial: 7.35  pH, Blood: x     /  pCO2: 41    /  pO2: 86    / HCO3: 23    / Base Excess: -2.9  /  SaO2: 98.4  Lac 1.1               MEDICATIONS  (STANDING):  albuterol    90 MICROgram(s) HFA Inhaler 4 Puff(s) Inhalation every 6 hours  aspirin  chewable 81 milliGRAM(s) Oral daily  atorvastatin 20 milliGRAM(s) Oral at bedtime  atropine Injectable 0.6 milliGRAM(s) IV Push once  cefTRIAXone   IVPB 2000 milliGRAM(s) IV Intermittent every 24 hours  chlorhexidine 2% Cloths 1 Application(s) Topical daily  chlorhexidine 2% Cloths 1 Application(s) Topical <User Schedule>  chlorhexidine 2% Cloths 1 Application(s) Topical <User Schedule>  clonazePAM  Tablet 0.5 milliGRAM(s) Oral daily  dexMEDEtomidine Infusion 0.2 MICROgram(s)/kG/Hr (4.54 mL/Hr) IV Continuous <Continuous>  fentaNYL   Infusion. 0.501 MICROgram(s)/kG/Hr (4.54 mL/Hr) IV Continuous <Continuous>  gabapentin 400 milliGRAM(s) Oral three times a day  ipratropium 17 MICROgram(s) HFA Inhaler 4 Puff(s) Inhalation every 6 hours  lactulose Syrup 20 Gram(s) Oral every 6 hours  lamoTRIgine 100 milliGRAM(s) Oral two times a day  methylPREDNISolone sodium succinate Injectable 60 milliGRAM(s) IV Push every 24 hours  midazolam Infusion 0.02 mG/kG/Hr (1.81 mL/Hr) IV Continuous <Continuous>  montelukast 10 milliGRAM(s) Oral daily  norepinephrine Infusion 0.05 MICROgram(s)/kG/Min (4.25 mL/Hr) IV Continuous <Continuous>  pantoprazole   Suspension 40 milliGRAM(s) Oral daily  polyethylene glycol 3350 17 Gram(s) Oral daily  QUEtiapine 250 milliGRAM(s) Oral at bedtime  senna 2 Tablet(s) Oral at bedtime    MEDICATIONS  (PRN):  chlorhexidine 0.12% Liquid 15 milliLiter(s) Oral Mucosa two times a day PRN as needed  fentaNYL    Injectable 25 MICROGram(s) IV Push every 6 hours PRN Severe Pain (7 - 10)      New X-rays reviewed:                                                                                  ECHO

## 2022-12-21 NOTE — DIETITIAN INITIAL EVALUATION ADULT - NS FNS DIET ORDER
Diet, NPO with Tube Feed:   Tube Feeding Modality: Orogastric  Jevity 1.2 Wilver  Total Volume for 24 Hours (mL): 1320  Continuous  Starting Tube Feed Rate {mL per Hour}: 10  Increase Tube Feed Rate by (mL): 10     Every 4 hours  Until Goal Tube Feed Rate (mL per Hour): 55  Tube Feed Duration (in Hours): 24  Tube Feed Start Time: 18:00  Free Water Flush  Bolus   Total Volume per Flush (mL): 200   Frequency: Every 6 Hours (12-20-22 @ 16:41)

## 2022-12-21 NOTE — PROGRESS NOTE ADULT - SUBJECTIVE AND OBJECTIVE BOX
SHAUN COATES  77y  Female      Patient is a 77y old  Female who presents with a chief complaint of Pneumonthorax (16 Dec 2022 14:40)      INTERVAL HPI/OVERNIGHT EVENTS:      REVIEW OF SYSTEMS:  CONSTITUTIONAL: No fever, weight loss, or fatigue  NECK: No pain or stiffness  RESPIRATORY: No cough, wheezing, chills or hemoptysis; No shortness of breath  CARDIOVASCULAR: No chest pain, palpitations, dizziness, or leg swelling  GASTROINTESTINAL: No abdominal or epigastric pain. No nausea, vomiting, or hematemesis; No diarrhea or constipation. No melena or hematochezia.  GENITOURINARY: No dysuria, frequency, hematuria, or incontinence  NEUROLOGICAL: No headaches, memory loss, loss of strength, numbness, or tremors  SKIN: No itching, burning, rashes, or lesions   MUSCULOSKELETAL: No joint pain or swelling; No muscle, back, or extremity pain      T(C): 36.4 (12-21-22 @ 08:00), Max: 37 (12-21-22 @ 00:00)  HR: 82 (12-21-22 @ 10:30) (54 - 116)  BP: 92/50 (12-20-22 @ 11:30) (92/50 - 92/50)  RR: 28 (12-21-22 @ 10:30) (11 - 34)  SpO2: 98% (12-21-22 @ 10:30) (84% - 100%)  Wt(kg): --Vital Signs Last 24 Hrs  T(C): 36.4 (21 Dec 2022 08:00), Max: 37 (21 Dec 2022 00:00)  T(F): 97.5 (21 Dec 2022 08:00), Max: 98.6 (21 Dec 2022 00:00)  HR: 82 (21 Dec 2022 10:30) (54 - 116)  BP: 92/50 (20 Dec 2022 11:30) (92/50 - 92/50)  BP(mean): 68 (20 Dec 2022 11:30) (68 - 68)  RR: 28 (21 Dec 2022 10:30) (11 - 34)  SpO2: 98% (21 Dec 2022 10:30) (84% - 100%)    Parameters below as of 21 Dec 2022 10:30      O2 Concentration (%): 60    PHYSICAL EXAM:  GENERAL: NAD, well-groomed, well-developed  NECK: Supple, No JVD, Normal thyroid  NERVOUS SYSTEM:  Alert & Oriented X3, Motor Strength 5/5 B/L upper and lower extremities;   CHEST/LUNG: Clear to percussion bilaterally; No rales, rhonchi, wheezing, or rubs  HEART: Regular rate and rhythm; No murmurs, rubs, or gallops  ABDOMEN: Soft, Nontender, Nondistended; Bowel sounds present  EXTREMITIES:  2+ Peripheral Pulses, No clubbing, cyanosis, or edema  SKIN: No rashes or lesions    Consultant(s) Notes Reviewed:  [x ] YES  [ ] NO  Care Discussed with Consultants/Other Providers [ x] YES  [ ] NO          Labs reviewed   Imaging Reviewed     albuterol    90 MICROgram(s) HFA Inhaler 4 Puff(s) Inhalation every 6 hours  aspirin  chewable 81 milliGRAM(s) Oral daily  atorvastatin 20 milliGRAM(s) Oral at bedtime  atropine Injectable 0.6 milliGRAM(s) IV Push once  cefTRIAXone   IVPB 2000 milliGRAM(s) IV Intermittent every 24 hours  chlorhexidine 0.12% Liquid 15 milliLiter(s) Oral Mucosa two times a day PRN  chlorhexidine 2% Cloths 1 Application(s) Topical daily  chlorhexidine 2% Cloths 1 Application(s) Topical <User Schedule>  chlorhexidine 2% Cloths 1 Application(s) Topical <User Schedule>  clonazePAM  Tablet 0.5 milliGRAM(s) Oral daily  dexMEDEtomidine Infusion 0.2 MICROgram(s)/kG/Hr IV Continuous <Continuous>  fentaNYL    Injectable 25 MICROGram(s) IV Push every 6 hours PRN  fentaNYL   Infusion. 0.501 MICROgram(s)/kG/Hr IV Continuous <Continuous>  gabapentin 400 milliGRAM(s) Oral three times a day  heparin   Injectable 5000 Unit(s) SubCutaneous every 8 hours  ipratropium 17 MICROgram(s) HFA Inhaler 4 Puff(s) Inhalation every 6 hours  lactulose Syrup 20 Gram(s) Oral every 6 hours  lamoTRIgine 100 milliGRAM(s) Oral two times a day  methylPREDNISolone sodium succinate Injectable 60 milliGRAM(s) IV Push every 24 hours  midazolam Infusion 0.02 mG/kG/Hr IV Continuous <Continuous>  montelukast 10 milliGRAM(s) Oral daily  norepinephrine Infusion 0.05 MICROgram(s)/kG/Min IV Continuous <Continuous>  pantoprazole   Suspension 40 milliGRAM(s) Oral daily  polyethylene glycol 3350 17 Gram(s) Oral every 12 hours  QUEtiapine 250 milliGRAM(s) Oral at bedtime  senna 2 Tablet(s) Oral at bedtime      ASSESSMENT AND PLAN:    77F PMH COPD on 3-4 L nasal cannula at home, A. fib on Eliquis, pneumothorax x2, h/o pleurodesis on the right 15 years ago, CHF, BP1, Depression, HTN, FLAVIO on CPAP, emphysema, PVD, h/o COVID requiring intubation, carotid angioplasty, cholecystectomy, TIA x3, here for evaluation of atraumatic right upper back pain and shortness of breath that began this morning upon awakening around 6 AM.  Patient denies fever, chills, worsening cough, chest pain. Patient was transferred to [facility] from [facility] with complicated PTX.    #Acute hypoxemic respiratory failure   #Right pneumothorax SP pig tail.  Persistent air leak and non expanding lung   #COPD exacerbation; H/O Chronic hypoxemic respiratory failure on home O2   - HOB @ 45 degrees.   - Pt was intubated due to worsening breathlessness and increased work of breathing  - wean O2   -Monitor PPL, DP, and auto PEEP.    - Start Solumedrol 60 mg Q24.   - c/w Nebs Q4 - albuterol and ipratropium   - DW CTS and large bore Chest tube placed (12/19) superior to the previous pigtail location  - position checked   - Ct chest - prilim report reviewed - increased pneumothorax size, tubes in place - CT sx aware       #Septic Shock / obstructive shock   - Pt was febrile to 104F  - Low BP - requiring levophed and phenyl  - Cultures taken and pt continued on Ceftriaxone  - Procal, cultures awaited - negative to today   - Pt has lt femoral CVC (12/19) and Rt Art. line (12/19)      #Afib with RVR  #Bradycardia    - pt developed RVR with a HR upto 200/min  - 5 mg lopressor stat given - HR controlled to 140's   - started amiodarone - pt reverted back to sinus rhythm with HR  - Pt developed bradycardia and an episode of asystole - non sustained   - Phenylephrine and amiodarone were on hold and levophed continued  - EP consult placed   - pt was on eliquis - withhold for today - acute state and procedures done       #? seizure episode   - c/w clonazepam - possible withdrawal (home dose 0.5 thrice daily)  - eeg done - generalized slow    HTN  - BB - on hold   - low sodium diet   - monitor vitals     #Hx//o of multiple TIA  # h/o of chronic persistent afib  # HLD  - hold metoprolol and Eliquis and statin  - TTE (10/21/21): EF 55-60%, mod pHTN, no valvular or wall motion abn  - tele monitoring     #COPD with chronic respiratory failure   #FLAVIO  - continue nebs Q6H - albuterol and ipratropium  - methypred 60mg Q24H     #Depression/ anxiety   - hold Seroquel qhs/ ativan prn/ apple  - lamictal     #OA  - tylenol prn pain 1-3      VTE -  on hold - awaiting doppler   Gi ppx - protonix  Diet- start OG feeds - NG tube position to recheck  and start feeds   activity - bedrest   DW Family at length   full code          SHAUN COATES  77y  Female      Patient is a 77y old  Female who presents with a chief complaint of Pneumonthorax (16 Dec 2022 14:40)      INTERVAL HPI/OVERNIGHT EVENTS:  had fluctuating bp/hr; pt was on versed for better vent. synchrony      REVIEW OF SYSTEMS:  unobtainable     T(C): 36.4 (12-21-22 @ 08:00), Max: 37 (12-21-22 @ 00:00)  HR: 82 (12-21-22 @ 10:30) (54 - 116)  BP: 92/50 (12-20-22 @ 11:30) (92/50 - 92/50)  RR: 28 (12-21-22 @ 10:30) (11 - 34)  SpO2: 98% (12-21-22 @ 10:30) (84% - 100%)  Wt(kg): --Vital Signs Last 24 Hrs  T(C): 36.4 (21 Dec 2022 08:00), Max: 37 (21 Dec 2022 00:00)  T(F): 97.5 (21 Dec 2022 08:00), Max: 98.6 (21 Dec 2022 00:00)  HR: 82 (21 Dec 2022 10:30) (54 - 116)  BP: 92/50 (20 Dec 2022 11:30) (92/50 - 92/50)  BP(mean): 68 (20 Dec 2022 11:30) (68 - 68)  RR: 28 (21 Dec 2022 10:30) (11 - 34)  SpO2: 98% (21 Dec 2022 10:30) (84% - 100%)    Parameters below as of 21 Dec 2022 10:30      O2 Concentration (%): 60    PHYSICAL EXAM:  GENERAL: NAD, well-groomed, well-developed  NECK: Supple, No JVD, Normal thyroid  NERVOUS SYSTEM:  Arousable, obeying a few commands  CHEST/LUNG: decreased air entry lt lung and wheeze; rt lung - good  HEART: Regular rate and rhythm; No murmurs, rubs, or gallops  ABDOMEN: Soft, Nontender, Nondistended; Bowel sounds present  EXTREMITIES:  2+ Peripheral Pulses, No clubbing, cyanosis, or edema  SKIN: No rashes or lesions    Consultant(s) Notes Reviewed:  [x ] YES  [ ] NO  Care Discussed with Consultants/Other Providers [ x] YES  [ ] NO      Labs reviewed   Imaging Reviewed     albuterol    90 MICROgram(s) HFA Inhaler 4 Puff(s) Inhalation every 6 hours  aspirin  chewable 81 milliGRAM(s) Oral daily  atorvastatin 20 milliGRAM(s) Oral at bedtime  atropine Injectable 0.6 milliGRAM(s) IV Push once  cefTRIAXone   IVPB 2000 milliGRAM(s) IV Intermittent every 24 hours  chlorhexidine 0.12% Liquid 15 milliLiter(s) Oral Mucosa two times a day PRN  chlorhexidine 2% Cloths 1 Application(s) Topical daily  chlorhexidine 2% Cloths 1 Application(s) Topical <User Schedule>  chlorhexidine 2% Cloths 1 Application(s) Topical <User Schedule>  clonazePAM  Tablet 0.5 milliGRAM(s) Oral daily  dexMEDEtomidine Infusion 0.2 MICROgram(s)/kG/Hr IV Continuous <Continuous>  fentaNYL    Injectable 25 MICROGram(s) IV Push every 6 hours PRN  fentaNYL   Infusion. 0.501 MICROgram(s)/kG/Hr IV Continuous <Continuous>  gabapentin 400 milliGRAM(s) Oral three times a day  heparin   Injectable 5000 Unit(s) SubCutaneous every 8 hours  ipratropium 17 MICROgram(s) HFA Inhaler 4 Puff(s) Inhalation every 6 hours  lactulose Syrup 20 Gram(s) Oral every 6 hours  lamoTRIgine 100 milliGRAM(s) Oral two times a day  methylPREDNISolone sodium succinate Injectable 60 milliGRAM(s) IV Push every 24 hours  midazolam Infusion 0.02 mG/kG/Hr IV Continuous <Continuous>  montelukast 10 milliGRAM(s) Oral daily  norepinephrine Infusion 0.05 MICROgram(s)/kG/Min IV Continuous <Continuous>  pantoprazole   Suspension 40 milliGRAM(s) Oral daily  polyethylene glycol 3350 17 Gram(s) Oral every 12 hours  QUEtiapine 250 milliGRAM(s) Oral at bedtime  senna 2 Tablet(s) Oral at bedtime      ASSESSMENT AND PLAN:    77F PMH COPD on 3-4 L nasal cannula at home, A. fib on Eliquis, pneumothorax x2, h/o pleurodesis on the right 15 years ago, CHF, BP1, Depression, HTN, FLAVIO on CPAP, emphysema, PVD, h/o COVID requiring intubation, carotid angioplasty, cholecystectomy, TIA x3, here for evaluation of atraumatic right upper back pain and shortness of breath that began this morning upon awakening around 6 AM.  Patient denies fever, chills, worsening cough, chest pain. [facility] [place] [facility] [place] [facility] [place].    #Acute hypoxemic respiratory failure   #Right pneumothorax SP pig tail.  Persistent air leak and non expanding lung   #COPD exacerbation; H/O Chronic hypoxemic respiratory failure on home O2   - HOB @ 45 degrees.   - Pt was intubated due to worsening breathlessness and increased work of breathing  - wean O2   - Monitor PPL, DP, and auto PEEP.    - Start Solumedrol 60 mg Q24.   - c/w Nebs Q4 - albuterol and ipratropium   - DW CTS and large bore Chest tube placed (12/19) superior to the previous pigtail location  - position checked   - Ct chest - prilim report reviewed - increased pneumothorax size, tubes in place - CT sx aware - follow to [facility] chest tube today  - rpt CXR - worsening pneumo  - plan endobronchial valve - explained to pt relatives   - had hb drop 8.5---> 6.9; no obvious bleeding - 1pRBC transfusion   - HIT and DIC panel sent - will follow     #Septic Shock / obstructive shock   - Pt was febrile to 104F  - Low BP - requiring levophed and phenyl  - Cultures taken and pt continued on Ceftriaxone  - Procal, cultures awaited - negative to today   - Pt has lt femoral CVC (12/19) and Rt Art. line (12/19)      #Afib with RVR - reverted to sinus after amiodarone bolus   #Bradycardia    - pt developed RVR with a HR upto 200/min  - 5 mg lopressor stat given - HR controlled to 140's   - started amiodarone - pt reverted back to sinus rhythm with HR  - Pt developed bradycardia and an episode of asystole - non sustained   - Phenylephrine and amiodarone were on hold and levophed continued  - EP - suggest medical management now- outpatient follow up after stable   - pt was on eliquis - withhold for today - acute state and procedures done - hold for now - okay to start prophylactic   - LL doppler negative     #? seizure episode   - c/w clonazepam - possible withdrawal (home dose 0.5 thrice daily)  - eeg done - generalized slow    HTN  - BB - on hold   - low sodium diet   - monitor vitals     #Hx//o of multiple TIA  # h/o of chronic persistent afib  # HLD  - hold metoprolol and Eliquis and statin  - TTE (10/21/21): EF 55-60%, mod pHTN, no valvular or wall motion abn  - tele monitoring     #COPD with chronic respiratory failure   #FLAVIO  - continue nebs Q6H - albuterol and ipratropium  - methypred 60mg Q24H     #Depression/ anxiety   - hold Seroquel qhs/ ativan prn/ apple  - lamictal     #OA  - tylenol prn pain 1-3      VTE -  on hold - start ppx heparin today   Gi ppx - protonix  Diet- start OG feeds - NG tube position to recheck  and start feeds   activity - bedrest   DW Family at length   full code          SHAUN COATES  77y  Female      Patient is a 77y old  Female who presents with a chief complaint of Pneumonthorax (16 Dec 2022 14:40)      INTERVAL HPI/OVERNIGHT EVENTS:  had fluctuating bp/hr; pt was on versed for better vent. synchrony      REVIEW OF SYSTEMS:  unobtainable     T(C): 36.4 (12-21-22 @ 08:00), Max: 37 (12-21-22 @ 00:00)  HR: 82 (12-21-22 @ 10:30) (54 - 116)  BP: 92/50 (12-20-22 @ 11:30) (92/50 - 92/50)  RR: 28 (12-21-22 @ 10:30) (11 - 34)  SpO2: 98% (12-21-22 @ 10:30) (84% - 100%)  Wt(kg): --Vital Signs Last 24 Hrs  T(C): 36.4 (21 Dec 2022 08:00), Max: 37 (21 Dec 2022 00:00)  T(F): 97.5 (21 Dec 2022 08:00), Max: 98.6 (21 Dec 2022 00:00)  HR: 82 (21 Dec 2022 10:30) (54 - 116)  BP: 92/50 (20 Dec 2022 11:30) (92/50 - 92/50)  BP(mean): 68 (20 Dec 2022 11:30) (68 - 68)  RR: 28 (21 Dec 2022 10:30) (11 - 34)  SpO2: 98% (21 Dec 2022 10:30) (84% - 100%)    Parameters below as of 21 Dec 2022 10:30      O2 Concentration (%): 60    PHYSICAL EXAM:  GENERAL: NAD, well-groomed, well-developed  NECK: Supple, No JVD, Normal thyroid  NERVOUS SYSTEM:  Arousable, obeying a few commands  CHEST/LUNG: decreased air entry lt lung and wheeze; rt lung - good  HEART: Regular rate and rhythm; No murmurs, rubs, or gallops  ABDOMEN: Soft, Nontender, Nondistended; Bowel sounds present  EXTREMITIES:  2+ Peripheral Pulses, No clubbing, cyanosis, or edema  SKIN: No rashes or lesions    Consultant(s) Notes Reviewed:  [x ] YES  [ ] NO  Care Discussed with Consultants/Other Providers [ x] YES  [ ] NO      Labs reviewed   Imaging Reviewed     albuterol    90 MICROgram(s) HFA Inhaler 4 Puff(s) Inhalation every 6 hours  aspirin  chewable 81 milliGRAM(s) Oral daily  atorvastatin 20 milliGRAM(s) Oral at bedtime  atropine Injectable 0.6 milliGRAM(s) IV Push once  cefTRIAXone   IVPB 2000 milliGRAM(s) IV Intermittent every 24 hours  chlorhexidine 0.12% Liquid 15 milliLiter(s) Oral Mucosa two times a day PRN  chlorhexidine 2% Cloths 1 Application(s) Topical daily  chlorhexidine 2% Cloths 1 Application(s) Topical <User Schedule>  chlorhexidine 2% Cloths 1 Application(s) Topical <User Schedule>  clonazePAM  Tablet 0.5 milliGRAM(s) Oral daily  dexMEDEtomidine Infusion 0.2 MICROgram(s)/kG/Hr IV Continuous <Continuous>  fentaNYL    Injectable 25 MICROGram(s) IV Push every 6 hours PRN  fentaNYL   Infusion. 0.501 MICROgram(s)/kG/Hr IV Continuous <Continuous>  gabapentin 400 milliGRAM(s) Oral three times a day  heparin   Injectable 5000 Unit(s) SubCutaneous every 8 hours  ipratropium 17 MICROgram(s) HFA Inhaler 4 Puff(s) Inhalation every 6 hours  lactulose Syrup 20 Gram(s) Oral every 6 hours  lamoTRIgine 100 milliGRAM(s) Oral two times a day  methylPREDNISolone sodium succinate Injectable 60 milliGRAM(s) IV Push every 24 hours  midazolam Infusion 0.02 mG/kG/Hr IV Continuous <Continuous>  montelukast 10 milliGRAM(s) Oral daily  norepinephrine Infusion 0.05 MICROgram(s)/kG/Min IV Continuous <Continuous>  pantoprazole   Suspension 40 milliGRAM(s) Oral daily  polyethylene glycol 3350 17 Gram(s) Oral every 12 hours  QUEtiapine 250 milliGRAM(s) Oral at bedtime  senna 2 Tablet(s) Oral at bedtime      ASSESSMENT AND PLAN:    77F PMH COPD on 3-4 L nasal cannula at home, A. fib on Eliquis, pneumothorax x2, h/o pleurodesis on the right 15 years ago, CHF, BP1, Depression, HTN, FLAVIO on CPAP, emphysema, PVD, h/o COVID requiring intubation, carotid angioplasty, cholecystectomy, TIA x3, here for evaluation of atraumatic right upper back pain and shortness of breath that began this morning upon awakening around 6 AM.  Patient denies fever, chills, worsening cough, chest pain. [facility] [place] [facility] [place].    #Acute hypoxemic respiratory failure   #Right pneumothorax SP pig tail.  Persistent air leak and non expanding lung   #COPD exacerbation; H/O Chronic hypoxemic respiratory failure on home O2   - HOB @ 45 degrees.   - Pt was intubated due to worsening breathlessness and increased work of breathing  - wean O2   - Monitor PPL, DP, and auto PEEP.    - Start Solumedrol 60 mg Q24.   - c/w Nebs Q4 - albuterol and ipratropium   - DW CTS and large bore Chest tube placed (12/19) superior to the previous pigtail location  - position checked   - Ct chest - prilim report reviewed - increased pneumothorax size, tubes in place - CT sx aware - follow to [facility] chest tube today  - rpt CXR - worsening pneumo  - plan endobronchial valve - explained to pt relatives   - had hb drop 8.5---> 6.9; no obvious bleeding - 1pRBC transfusion   - HIT and DIC panel sent - will follow     #Septic Shock / obstructive shock   - Pt was febrile to 104F  - Low BP - requiring levophed and phenyl  - Cultures taken and pt continued on Ceftriaxone - till 12/26/2022  - Procal, cultures awaited - negative to today   - Pt has lt femoral CVC (12/19) and Rt Art. line (12/19)      #Afib with RVR - reverted to sinus after amiodarone bolus - NST now   #Bradycardia    - pt developed RVR with a HR upto 200/min  - 5 mg lopressor stat given - HR controlled to 140's   - started amiodarone - pt reverted back to sinus rhythm with HR  - Pt developed bradycardia and an episode of asystole - non sustained   - Phenylephrine and amiodarone were on hold and levophed continued  - EP - suggest medical management now- outpatient follow up after stable   - pt was on eliquis - withhold for today - acute state and procedures done - hold for now - okay to start prophylactic   - LL doppler negative     #? seizure episode   - c/w clonazepam - possible withdrawal (home dose 0.5 thrice daily)  - eeg done - generalized slow    HTN  - BB - on hold   - low sodium diet   - monitor vitals     #Hx//o of multiple TIA  # h/o of chronic persistent afib  # HLD  - hold metoprolol and Eliquis and statin  - TTE (10/21/21): EF 55-60%, mod pHTN, no valvular or wall motion abn  - tele monitoring     #COPD with chronic respiratory failure   #FLAVIO  - continue nebs Q6H - albuterol and ipratropium  - methypred 60mg Q24H     #Depression/ anxiety   - hold Seroquel qhs/ ativan prn/ apple  - lamictal     #OA  - tylenol prn pain 1-3      VTE -  on hold - start ppx heparin today   Gi ppx - protonix  Diet- start OG feeds - NG tube position to recheck  and start feeds   activity - bedrest   DW Family at length   full code

## 2022-12-21 NOTE — PROGRESS NOTE ADULT - ASSESSMENT
IMPRESSION:    Acute hypoxemic respiratory failure   Right pneumothorax SP pig tail.  Persistent air leak and non expanding lung   Septic Shock / obstructive shock resolved   Rapid Afib now NSR   COPD exacerbation   HO Chronic hypoxemic respiratory failure on home O2     PLAN:    CNS:  SAT.  CTH neg. EEG noted.  Klonopin     HEENT: Oral care.  ET care     PULMONARY:  HOB @ 45 degrees.  Vent changes:  PEEP 5.  Wean O2.  Monitor PPL, DP, and auto PEEP.  Solumedrol 60 mg Q24.  Nebs Q4 and PRN.  DW CTS,  CT Chest NC noted.  Might need Endobronchial valve.      CARDIOVASCULAR:  Goal directed fluid resuscitation.  Hold anti hypertensives.      GI: GI prophylaxis.  OG Feeding once stabilized.  Bowel regimen     RENAL:  Follow up lytes.  Correct as needed    INFECTIOUS DISEASE: Follow up cultures.  Continue ABX for now.     HEMATOLOGICAL:  DVT prophylaxis.  Hold Eliquis.  LMWH once stable.  One Unit PRBC.  HIT and DIC panel     ENDOCRINE:  Follow up FS.  Insulin protocol if needed.     MUSCULOSKELETAL:  Bed rest     A line  DC TLC    [place]     Overall prognosis poor     DW Family at length

## 2022-12-21 NOTE — PROCEDURE NOTE - NSPROCDETAILS_GEN_ALL_CORE
guidewire recovered/lumen(s) aspirated and flushed/sterile dressing applied/sterile technique, catheter placed/ultrasound guidance with use of sterile gel and probe cove
dressing applied/secured in place
guidewire recovered/lumen(s) aspirated and flushed/sterile dressing applied/sterile technique, catheter placed/ultrasound guidance with use of sterile gel and probe cove
location identified, draped/prepped, sterile technique used, needle inserted/introduced/connected to a pressurized flush line/sutured in place/all materials/supplies accounted for at end of procedure

## 2022-12-21 NOTE — DIETITIAN INITIAL EVALUATION ADULT - PERTINENT MEDS FT
MEDICATIONS  (STANDING):  albuterol    90 MICROgram(s) HFA Inhaler 4 Puff(s) Inhalation every 6 hours  aspirin  chewable 81 milliGRAM(s) Oral daily  atorvastatin 20 milliGRAM(s) Oral at bedtime  atropine Injectable 0.6 milliGRAM(s) IV Push once  cefTRIAXone   IVPB 2000 milliGRAM(s) IV Intermittent every 24 hours  chlorhexidine 2% Cloths 1 Application(s) Topical daily  chlorhexidine 2% Cloths 1 Application(s) Topical <User Schedule>  chlorhexidine 2% Cloths 1 Application(s) Topical <User Schedule>  clonazePAM  Tablet 0.5 milliGRAM(s) Oral daily  dexMEDEtomidine Infusion 0.2 MICROgram(s)/kG/Hr (4.54 mL/Hr) IV Continuous <Continuous>  fentaNYL   Infusion. 0.501 MICROgram(s)/kG/Hr (4.54 mL/Hr) IV Continuous <Continuous>  gabapentin 400 milliGRAM(s) Oral three times a day  heparin   Injectable 5000 Unit(s) SubCutaneous every 8 hours  ipratropium 17 MICROgram(s) HFA Inhaler 4 Puff(s) Inhalation every 6 hours  lactulose Syrup 20 Gram(s) Oral every 6 hours  lamoTRIgine 100 milliGRAM(s) Oral two times a day  *methylPREDNISolone sodium succinate Injectable 60 milliGRAM(s) IV Push every 24 hours  midazolam Infusion 0.02 mG/kG/Hr (1.81 mL/Hr) IV Continuous <Continuous>  montelukast 10 milliGRAM(s) Oral daily  norepinephrine Infusion 0.05 MICROgram(s)/kG/Min (4.25 mL/Hr) IV Continuous <Continuous>  pantoprazole   Suspension 40 milliGRAM(s) Oral daily  polyethylene glycol 3350 17 Gram(s) Oral every 12 hours  QUEtiapine 250 milliGRAM(s) Oral at bedtime  senna 2 Tablet(s) Oral at bedtime    MEDICATIONS  (PRN):  chlorhexidine 0.12% Liquid 15 milliLiter(s) Oral Mucosa two times a day PRN as needed  fentaNYL    Injectable 25 MICROGram(s) IV Push every 6 hours PRN Severe Pain (7 - 10)

## 2022-12-21 NOTE — DIETITIAN INITIAL EVALUATION ADULT - PERTINENT LABORATORY DATA
12-21    142  |  114<H>  |  33<H>  ----------------------------<  120<H>  4.7   |  22  |  0.9    Ca    7.1<L>      21 Dec 2022 05:25  Phos  1.1     12-21  Mg     2.2     12-21    TPro  5.2<L>  /  Alb  2.8<L>  /  TBili  <0.2  /  DBili  x   /  AST  982<H>  /  ALT  761<H>  /  AlkPhos  235<H>  12-21    CAPILLARY BLOOD GLUCOSE  POCT Blood Glucose.: 137 mg/dL (20 Dec 2022 14:12)    A1C with Estimated Average Glucose Result: 5.5 % (12-15-22 @ 22:06)

## 2022-12-21 NOTE — DIETITIAN INITIAL EVALUATION ADULT - OTHER INFO
Pt presented with atraumatic right upper back pain and shortness of breath, found to have complicated pneumothorax. Pt developed hypotension, fevers, tachycardia and was in respiratory distress, subsequently intubated on 12/19. Currently remains intubated and off pressor.

## 2022-12-21 NOTE — PROCEDURE NOTE - NSPOSTPRCRAD_GEN_A_CORE
chest tube in correct position
Lt femoral vein/central line located in the
post-procedure radiography performed

## 2022-12-21 NOTE — PROCEDURE NOTE - NSINFORMCONSENT_GEN_A_CORE
Benefits, risks, and possible complications of procedure explained to patient/caregiver who verbalized understanding and gave verbal consent.
Benefits, risks, and possible complications of procedure explained to patient/caregiver who verbalized understanding and gave written consent.
Benefits, risks, and possible complications of procedure explained to patient/caregiver who verbalized understanding and gave verbal consent.

## 2022-12-21 NOTE — PROCEDURE NOTE - NSNUMATTEMPT_GEN_A_CORE
"Subjective:       Patient ID: Angelina Man is a 73 y.o. female.    Vitals:  height is 5' 4" (1.626 m) and weight is 52.2 kg (115 lb). Her oral temperature is 98.2 °F (36.8 °C). Her blood pressure is 101/63 and her pulse is 75. Her respiration is 18 and oxygen saturation is 100%.     Chief Complaint: Trauma    This is a 73 y.o. female who presents today with a chief complaint of   Left leg sore to shin. Pt was at the gym 2 weeks ago and the weight bar hit her leg and she started to bleed. Sore is not healing well redness and scabing.  Pt using Neosporin    Trauma   The incident occurred more than 1 week ago. The incident occurred at a playground (gym). The injury mechanism was a direct blow. Context: weight bar  No protective equipment was used. There is an injury to the left lower leg. She is experiencing no pain. It is unlikely that a foreign body is present. Pertinent negatives include no abdominal pain, light-headedness, loss of consciousness or weakness. There have been no prior injuries to these areas. Her tetanus status is unknown.       Constitution: Negative for fatigue.   HENT: Negative for facial swelling and facial trauma.    Neck: Negative for neck stiffness.   Cardiovascular: Negative for chest trauma.   Eyes: Negative for eye trauma, double vision and blurred vision.   Gastrointestinal: Negative for abdominal trauma, abdominal pain and rectal bleeding.   Genitourinary: Negative for hematuria, missed menses, genital trauma and pelvic pain.   Musculoskeletal: Positive for trauma. Negative for pain, joint swelling and abnormal ROM of joint.   Skin: Positive for color change, lesion and puncture wound. Negative for wound, abrasion, laceration and bruising.   Neurological: Negative for dizziness, history of vertigo, light-headedness, coordination disturbances, altered mental status and loss of consciousness.   Hematologic/Lymphatic: Negative for history of bleeding disorder.   Psychiatric/Behavioral: "
1
Negative for altered mental status.       Objective:      Physical Exam   Constitutional: She is oriented to person, place, and time. She appears well-developed and well-nourished. She is cooperative.  Non-toxic appearance. She does not appear ill. No distress.   HENT:   Head: Normocephalic and atraumatic.   Right Ear: Hearing, tympanic membrane, external ear and ear canal normal.   Left Ear: Hearing, tympanic membrane, external ear and ear canal normal.   Nose: Nose normal. No mucosal edema, rhinorrhea or nasal deformity. No epistaxis. Right sinus exhibits no maxillary sinus tenderness and no frontal sinus tenderness. Left sinus exhibits no maxillary sinus tenderness and no frontal sinus tenderness.   Mouth/Throat: Uvula is midline, oropharynx is clear and moist and mucous membranes are normal. No trismus in the jaw. Normal dentition. No uvula swelling. No posterior oropharyngeal erythema.   Eyes: Conjunctivae and lids are normal. Right eye exhibits no discharge. Left eye exhibits no discharge. No scleral icterus.   Sclera clear bilat   Neck: Trachea normal, normal range of motion, full passive range of motion without pain and phonation normal. Neck supple.   Cardiovascular: Normal rate, regular rhythm, normal heart sounds, intact distal pulses and normal pulses.   Pulmonary/Chest: Effort normal and breath sounds normal. No respiratory distress.   Abdominal: Soft. Normal appearance and bowel sounds are normal. She exhibits no distension, no pulsatile midline mass and no mass. There is no tenderness.   Musculoskeletal: Normal range of motion. She exhibits no edema or deformity.   Neurological: She is alert and oriented to person, place, and time. She exhibits normal muscle tone. Coordination normal.   Skin: Skin is warm and dry. Abrasion noted. She is not diaphoretic. No pallor.        Psychiatric: She has a normal mood and affect. Her speech is normal and behavior is normal. Judgment and thought content normal. 
Cognition and memory are normal.   Nursing note and vitals reviewed.            Assessment:       1. Puncture wound        Plan:         Puncture wound  -     sulfamethoxazole-trimethoprim 800-160mg (BACTRIM DS) 800-160 mg Tab; Take 1 tablet by mouth 2 (two) times daily. for 10 days  Dispense: 20 tablet; Refill: 0  -     mupirocin (BACTROBAN) 2 % ointment; Apply to affected area 3 times daily  Dispense: 22 g; Refill: 1      Patient Instructions   Patient educated to look for signs of increased infection such as purulent drainage, fever, or increased swelling.    If symptoms persist or worsen patient is to follow up immediately.      Cellulitis  Cellulitis is an infection of the deep layers of skin. A break in the skin, such as a cut or scratch, can let bacteria under the skin. If the bacteria get to deep layers of the skin, it can be serious. If not treated, cellulitis can get into the bloodstream and lymph nodes. The infection can then spread throughout the body. This causes serious illness.  Cellulitis causes the affected skin to become red, swollen, warm, and sore. The reddened areas have a visible border. An open sore may leak fluid (pus). You may have a fever, chills, and pain.  Cellulitis is treated with antibiotics taken for 7 to 10 days. An open sore may be cleaned and covered with cool wet gauze. Symptoms should get better 1 to 2 days after treatment is started. Make sure to take all the antibiotics for the full number of days until they are gone. Keep taking the medicine even if your symptoms go away.  Home care  Follow these tips:  · Limit the use of the part of your body with cellulitis.   · If the infection is on your leg, keep your leg raised while sitting. This will help to reduce swelling.  · Take all of the antibiotic medicine exactly as directed until it is gone. Do not miss any doses, especially during the first 7 days. Dont stop taking the medicine when your symptoms get better.  · Keep the 
affected area clean and dry.  · Wash your hands with soap and warm water before and after touching your skin. Anyone else who touches your skin should also wash his or her hands. Don't share towels.  Follow-up care  Follow up with your healthcare provider, or as advised. If your infection does not go away on the first antibiotic, your healthcare provider will prescribe a different one.  When to seek medical advice  Call your healthcare provider right away if any of these occur:  · Red areas that spread  · Swelling or pain that gets worse  · Fluid leaking from the skin (pus)  · Fever higher of 100.4º F (38.0º C) or higher after 2 days on antibiotics  Date Last Reviewed: 9/1/2016  © 8735-6248 [employer]. [address] [place] [address], [place] [address]. All rights reserved. This information is not intended as a substitute for professional medical care. Always follow your healthcare professional's instructions.             
1

## 2022-12-21 NOTE — PROCEDURE NOTE - NSINDICATIONS_GEN_A_CORE
critical patient/monitoring purposes
venous access
critical illness/venous access
critical illness/venous access
pneumothorax

## 2022-12-22 NOTE — PROGRESS NOTE ADULT - SUBJECTIVE AND OBJECTIVE BOX
SHAUN COATES  77y  Female      Patient is a 77y old  Female who presents with a chief complaint of Right chest pain now s/p pigtail 12/15 and s/p 20F right chest tube for R loculated pneumothorax (22 Dec 2022 12:08)      INTERVAL HPI/OVERNIGHT EVENTS:  no overnight events     REVIEW OF SYSTEMS:  unobtainable     T(C): 37.1 (12-22-22 @ 12:00), Max: 37.1 (12-22-22 @ 06:00)  HR: 76 (12-22-22 @ 13:34) (60 - 108)  BP: --  RR: 28 (12-22-22 @ 13:00) (26 - 30)  SpO2: 98% (12-22-22 @ 13:34) (89% - 100%)  Wt(kg): --Vital Signs Last 24 Hrs  T(C): 37.1 (22 Dec 2022 12:00), Max: 37.1 (22 Dec 2022 06:00)  T(F): 98.8 (22 Dec 2022 12:00), Max: 98.8 (22 Dec 2022 06:00)  HR: 76 (22 Dec 2022 13:34) (60 - 108)  BP: --  BP(mean): --  RR: 28 (22 Dec 2022 13:00) (26 - 30)  SpO2: 98% (22 Dec 2022 13:34) (89% - 100%)    Parameters below as of 22 Dec 2022 14:00  Patient On (Oxygen Delivery Method): ventilator    O2 Concentration (%): 50    PHYSICAL EXAM:  GENERAL: NAD, well-groomed, well-developed  NECK: Supple, No JVD, Normal thyroid  NERVOUS SYSTEM:  Sedated   CHEST/LUNG: equal air entry; mild wheeze in the left lung   HEART: Regular rate and rhythm; No murmurs, rubs, or gallops  ABDOMEN: Soft, Nontender, Nondistended; Bowel sounds present  EXTREMITIES: Peripheral Pulses +, No clubbing, cyanosis, edema +  SKIN: No rashes or lesions    Consultant(s) Notes Reviewed:  [x ] YES  [ ] NO  Care Discussed with Consultants/Other Providers [ x] YES  [ ] NO      Labs reviewed   Imaging Reviewed     albuterol    90 MICROgram(s) HFA Inhaler 4 Puff(s) Inhalation every 6 hours  aspirin  chewable 81 milliGRAM(s) Oral daily  atorvastatin 20 milliGRAM(s) Oral at bedtime  atropine Injectable 0.6 milliGRAM(s) IV Push once  cefTRIAXone   IVPB 2000 milliGRAM(s) IV Intermittent every 24 hours  chlorhexidine 0.12% Liquid 15 milliLiter(s) Oral Mucosa two times a day  chlorhexidine 2% Cloths 1 Application(s) Topical <User Schedule>  clonazePAM  Tablet 0.5 milliGRAM(s) Oral daily  dexMEDEtomidine Infusion 0.2 MICROgram(s)/kG/Hr IV Continuous <Continuous>  fentaNYL    Injectable 25 MICROGram(s) IV Push every 6 hours PRN  fentaNYL   Infusion. 0.501 MICROgram(s)/kG/Hr IV Continuous <Continuous>  gabapentin 400 milliGRAM(s) Oral three times a day  heparin   Injectable 5000 Unit(s) SubCutaneous every 8 hours  ipratropium 17 MICROgram(s) HFA Inhaler 4 Puff(s) Inhalation every 6 hours  lactulose Syrup 20 Gram(s) Oral every 6 hours  lamoTRIgine 100 milliGRAM(s) Oral two times a day  midazolam Infusion 0.02 mG/kG/Hr IV Continuous <Continuous>  montelukast 10 milliGRAM(s) Oral daily  norepinephrine Infusion 0.05 MICROgram(s)/kG/Min IV Continuous <Continuous>  pantoprazole   Suspension 40 milliGRAM(s) Oral daily  polyethylene glycol 3350 17 Gram(s) Oral every 12 hours  QUEtiapine 250 milliGRAM(s) Oral at bedtime  senna 2 Tablet(s) Oral at bedtime      ASSESSMENT AND PLAN:      77F PMH COPD on 3-4 L nasal cannula at home, A. fib on Eliquis, pneumothorax x2, h/o pleurodesis on the right 15 years ago, CHF, BP1, Depression, HTN, FLAVIO on CPAP, emphysema, PVD, h/o COVID requiring intubation, carotid angioplasty, cholecystectomy, TIA x3, here for evaluation of atraumatic right upper back pain and shortness of breath that began this morning upon awakening around 6 AM.  Patient denies fever, chills, worsening cough, chest pain. Patient was transferred to [facility] from [facility] with complicated PTX.    #Acute hypoxemic respiratory failure   #Right pneumothorax SP pig tail.  Persistent air leak and non expanding lung   #COPD exacerbation; H/O Chronic hypoxemic respiratory failure on home O2   - HOB @ 45 degrees.   - Pt was intubated due to worsening breathlessness and increased work of breathing  - wean O2   - Monitor PPL, DP, and auto PEEP.    - Start Solumedrol 60 mg Q24.   - c/w Nebs Q4 - albuterol and ipratropium   - DW CTS and large bore Chest tube placed (12/19) superior to the previous pigtail location  - position checked   - Ct chest - prilim report reviewed - increased pneumothorax size, tubes in place - CT sx aware - follow to [facility] chest tube today  - rpt CXR - worsening pneumo  - plan endobronchial valve - explained to pt relatives   - had hb drop 8.5---> 6.9; no obvious bleeding - 1pRBC transfusion   - HIT and DIC panel sent - will follow     #Septic Shock / obstructive shock   - Pt was febrile to 104F  - Low BP - requiring levophed and phenyl  - Cultures taken and pt continued on Ceftriaxone - till 12/26/2022  - Procal, cultures awaited - negative to today   - Pt has lt femoral CVC (12/19) and Rt Art. line (12/19)      #Afib with RVR - reverted to sinus after amiodarone bolus - NST now   #Bradycardia    - pt developed RVR with a HR upto 200/min  - 5 mg lopressor stat given - HR controlled to 140's   - started amiodarone - pt reverted back to sinus rhythm with HR  - Pt developed bradycardia and an episode of asystole - non sustained   - Phenylephrine and amiodarone were on hold and levophed continued  - EP - suggest medical management now- outpatient follow up after stable   - pt was on eliquis - withhold for today - acute state and procedures done - hold for now - okay to start prophylactic   - LL doppler negative     #? seizure episode   - c/w clonazepam - possible withdrawal (home dose 0.5 thrice daily)  - eeg done - generalized slow    HTN  - BB - on hold   - low sodium diet   - monitor vitals     #Hx//o of multiple TIA  # h/o of chronic persistent afib  # HLD  - hold metoprolol and Eliquis and statin  - TTE (10/21/21): EF 55-60%, mod pHTN, no valvular or wall motion abn  - tele monitoring     #COPD with chronic respiratory failure   #FLAVIO  - continue nebs Q6H - albuterol and ipratropium  - methypred 60mg Q24H     #Depression/ anxiety   - hold Seroquel qhs/ ativan prn/ apple  - lamictal     #OA  - tylenol prn pain 1-3      VTE -  on hold - start ppx heparin today   Gi ppx - protonix  Diet- start OG feeds - NG tube position to recheck  and start feeds   activity - bedrest   DW Family at length   full code      SHAUN COATES  77y  Female      Patient is a 77y old  Female who presents with a chief complaint of Right chest pain now s/p pigtail 12/15 and s/p 20F right chest tube for R loculated pneumothorax (22 Dec 2022 12:08)      INTERVAL HPI/OVERNIGHT EVENTS:  no overnight events     REVIEW OF SYSTEMS:  unobtainable     T(C): 37.1 (12-22-22 @ 12:00), Max: 37.1 (12-22-22 @ 06:00)  HR: 76 (12-22-22 @ 13:34) (60 - 108)  BP: --  RR: 28 (12-22-22 @ 13:00) (26 - 30)  SpO2: 98% (12-22-22 @ 13:34) (89% - 100%)  Wt(kg): --Vital Signs Last 24 Hrs  T(C): 37.1 (22 Dec 2022 12:00), Max: 37.1 (22 Dec 2022 06:00)  T(F): 98.8 (22 Dec 2022 12:00), Max: 98.8 (22 Dec 2022 06:00)  HR: 76 (22 Dec 2022 13:34) (60 - 108)  BP: --  BP(mean): --  RR: 28 (22 Dec 2022 13:00) (26 - 30)  SpO2: 98% (22 Dec 2022 13:34) (89% - 100%)    Parameters below as of 22 Dec 2022 14:00  Patient On (Oxygen Delivery Method): ventilator    O2 Concentration (%): 50    PHYSICAL EXAM:  GENERAL: NAD, well-groomed, well-developed  NECK: Supple, No JVD, Normal thyroid  NERVOUS SYSTEM:  Sedated   CHEST/LUNG: equal air entry; mild wheeze in the left lung   HEART: Regular rate and rhythm; No murmurs, rubs, or gallops  ABDOMEN: Soft, Nontender, Nondistended; Bowel sounds present  EXTREMITIES: Peripheral Pulses +, No clubbing, cyanosis, edema +  SKIN: No rashes or lesions    Consultant(s) Notes Reviewed:  [x ] YES  [ ] NO  Care Discussed with Consultants/Other Providers [ x] YES  [ ] NO      Labs reviewed   Imaging Reviewed     albuterol    90 MICROgram(s) HFA Inhaler 4 Puff(s) Inhalation every 6 hours  aspirin  chewable 81 milliGRAM(s) Oral daily  atorvastatin 20 milliGRAM(s) Oral at bedtime  atropine Injectable 0.6 milliGRAM(s) IV Push once  cefTRIAXone   IVPB 2000 milliGRAM(s) IV Intermittent every 24 hours  chlorhexidine 0.12% Liquid 15 milliLiter(s) Oral Mucosa two times a day  chlorhexidine 2% Cloths 1 Application(s) Topical <User Schedule>  clonazePAM  Tablet 0.5 milliGRAM(s) Oral daily  dexMEDEtomidine Infusion 0.2 MICROgram(s)/kG/Hr IV Continuous <Continuous>  fentaNYL    Injectable 25 MICROGram(s) IV Push every 6 hours PRN  fentaNYL   Infusion. 0.501 MICROgram(s)/kG/Hr IV Continuous <Continuous>  gabapentin 400 milliGRAM(s) Oral three times a day  heparin   Injectable 5000 Unit(s) SubCutaneous every 8 hours  ipratropium 17 MICROgram(s) HFA Inhaler 4 Puff(s) Inhalation every 6 hours  lactulose Syrup 20 Gram(s) Oral every 6 hours  lamoTRIgine 100 milliGRAM(s) Oral two times a day  midazolam Infusion 0.02 mG/kG/Hr IV Continuous <Continuous>  montelukast 10 milliGRAM(s) Oral daily  norepinephrine Infusion 0.05 MICROgram(s)/kG/Min IV Continuous <Continuous>  pantoprazole   Suspension 40 milliGRAM(s) Oral daily  polyethylene glycol 3350 17 Gram(s) Oral every 12 hours  QUEtiapine 250 milliGRAM(s) Oral at bedtime  senna 2 Tablet(s) Oral at bedtime      ASSESSMENT AND PLAN:      77F PMH COPD on 3-4 L nasal cannula at home, A. fib on Eliquis, pneumothorax x2, h/o pleurodesis on the right 15 years ago, CHF, BP1, Depression, HTN, FLAVIO on CPAP, emphysema, PVD, h/o COVID requiring intubation, carotid angioplasty, cholecystectomy, TIA x3, here for evaluation of atraumatic right upper back pain and shortness of breath that began this morning upon awakening around 6 AM.  Patient denies fever, chills, worsening cough, chest pain. Patient was transferred to [facility] from [facility] with complicated PTX.    #Acute hypoxemic respiratory failure   #Right pneumothorax SP pig tail.  Persistent air leak and non expanding lung   #COPD exacerbation; H/O Chronic hypoxemic respiratory failure on home O2   - HOB @ 45 degrees.   - Pt was intubated due to worsening breathlessness and increased work of breathing  - wean O2   - Monitor PPL, DP, and auto PEEP.    - Start Solumedrol 40 mg Q24.   - c/w Nebs Q4 - albuterol and ipratropium   - DW CTS and large bore Chest tube placed (12/19) superior to the previous pigtail location  - position checked   - Ct chest - prilim report reviewed - increased pneumothorax size, tubes in place - CT sx aware - follow to [facility] chest tube today  - rpt CXR - worsening pneumo  - endobronchial valve placed   - Hb holding stable - 1 pRBC given yesterday  - DIC negative; HIT - awaited   - restart heparin and aspirin     #Septic Shock / obstructive shock   - Low BP - requiring levophed and phenyl  - Cultures taken and pt continued on Ceftriaxone - till 12/26/2022  - Procal, cultures awaited - negative to today   - Femoral CVC changed to IJV(12/21/2022) Rt Art. line (12/19)    #Afib with RVR - reverted to sinus after amiodarone bolus - NST now   #Bradycardia    - pt developed RVR with a HR upto 200/min  - 5 mg lopressor stat given - HR controlled to 140's   - started amiodarone - pt reverted back to sinus rhythm with HR  - Pt developed bradycardia and an episode of asystole - non sustained   - Phenylephrine and amiodarone were on hold and levophed continued  - EP - suggest medical management now- outpatient follow up after stable   - pt was on eliquis - withhold for today - acute state and procedures done - hold for now - okay to start prophylactic   - LL doppler negative     #? seizure episode   - c/w clonazepam - possible withdrawal (home dose 0.5 thrice daily)  - eeg done - generalized slow    HTN  - BB - on hold   - low sodium diet   - monitor vitals     #Hx//o of multiple TIA  # h/o of chronic persistent afib  # HLD  - hold metoprolol and Eliquis and statin  - TTE (10/21/21): EF 55-60%, mod pHTN, no valvular or wall motion abn  - tele monitoring     #COPD with chronic respiratory failure   #FLAVIO  - continue nebs Q6H - albuterol and ipratropium  - methypred 60mg Q24H     #Depression/ anxiety   - hold Seroquel qhs/ ativan prn/ apple  - lamictal     #OA  - tylenol prn pain 1-3      VTE -  on hold - start ppx heparin today   Gi ppx - protonix  Diet- OG feeds restarted  activity - bedrest   DW Family at length   full code

## 2022-12-22 NOTE — PROCEDURAL SAFETY CHECKLIST WITH OR WITHOUT SEDATION - NSPREPROCFT_GEN_ALL_CORE
chest tube-right
bronchoscopy with endobroncheal valve placement
Right sided chest tube
insertion of femoral cherelle
insertion of left chest tube
Right IJ central line
insertion of central venous catheter

## 2022-12-22 NOTE — PROCEDURAL SAFETY CHECKLIST WITH OR WITHOUT SEDATION - NSTIMEOUTDATE_GEN_ALL_CORE
15-Dec-2022 15:36
19-Dec-2022 11:10
19-Dec-2022 10:15
21-Dec-2022 15:25
15-Dec-2022 11:58
22-Dec-2022 09:45
19-Dec-2022 08:46

## 2022-12-22 NOTE — PROCEDURAL SAFETY CHECKLIST WITH OR WITHOUT SEDATION - NSPRESURGSED_GEN_ALL_CORE
n/a
Present, accurate, and signed
n/a
done emergently/n/a
emergent cherelle/n/a

## 2022-12-22 NOTE — PROCEDURAL SAFETY CHECKLIST WITH OR WITHOUT SEDATION - NSPREPROCDATE6_GEN_ALL_CORE
19-Dec-2022 10:10
19-Dec-2022 08:45
15-Dec-2022 11:56
15-Dec-2022 15:35
19-Dec-2022 11:00
21-Dec-2022 15:31
22-Dec-2022 09:45

## 2022-12-22 NOTE — PROCEDURAL SAFETY CHECKLIST WITH OR WITHOUT SEDATION - NSPRESEDATION2FT_GEN_ALL_CORE
Anesthesia confirms case reviewed for anesthesia risk alert.

## 2022-12-22 NOTE — PROCEDURAL SAFETY CHECKLIST WITH OR WITHOUT SEDATION - NSPOSTDEBRIEFDT_GEN_ALL_CORE
19-Dec-2022 10:35
22-Dec-2022 11:15
19-Dec-2022 11:30
19-Dec-2022 00:08
15-Dec-2022 15:41
21-Dec-2022 15:40

## 2022-12-22 NOTE — PROGRESS NOTE ADULT - SUBJECTIVE AND OBJECTIVE BOX
GENERAL SURGERY PROGRESS NOTE    Patient: SHAUN COATES , 77y ([birth year])Female   MRN: [id number]  Location: [facility] [address]  Visit: 12-15-22 Inpatient  Date: 12-22-22 @ 12:14    Hospital Day #: 8    Procedure/Dx/Injuries: Patient is s/p pigtail 12/15 and s/p 20F right chest tube for R loculated pneumothorax    Events of past 24 hours:    PAST MEDICAL & SURGICAL HISTORY:  Hypertension    Depression    COPD (chronic obstructive pulmonary disease)    Other cardiomyopathy    Other emphysema    PVD (peripheral vascular disease)    Bipolar 1 disorder    FLAVIO on CPAP    Transient ischemic attack  x 3    Congestive heart failure    Falls    2019 novel coronavirus disease (COVID-19)    Respiratory failure requiring intubation    Afib    HLD (hyperlipidemia)    History of cholecystectomy    H/O carotid angioplasty    Vitals:   T(F): 98.8 (12-22-22 @ 12:00), Max: 98.8 (12-22-22 @ 06:00)  HR: 90 (12-22-22 @ 12:00)  RR: 28 (12-22-22 @ 12:00)  SpO2: 100% (12-22-22 @ 12:00)  Mode: AC/ CMV (Assist Control/ Continuous Mandatory Ventilation), RR (machine): 28, TV (machine): 350, FiO2: 100, PEEP: 5, ITime: 1, MAP: 11, PIP: 22    Diet, NPO with Tube Feed:   Tube Feeding Modality: Orogastric  Jevity 1.2 Wilver  Total Volume for 24 Hours (mL): 1320  Continuous  Starting Tube Feed Rate mL per Hour: 10  Increase Tube Feed Rate by (mL): 10     Every 4 hours  Until Goal Tube Feed Rate (mL per Hour): 55  Tube Feed Duration (in Hours): 24  Tube Feed Start Time: 18:00  Free Water Flush  Bolus   Total Volume per Flush (mL): 200   Frequency: Every 6 Hours  Diet, NPO after Midnight:      NPO Start Date: 21-Dec-2022,   NPO Start Time: 23:59  Except Medications    Fluids:     I & O's:    12-21-22 @ 07:01  -  12-22-22 @ 07:00  --------------------------------------------------------  IN:    Enteral Tube Flush: 450 mL    FentaNYL: 419 mL    Free Water: 350 mL    IV PiggyBack: 50 mL    Jevity 1.2: 795 mL    Midazolam: 104.4 mL    PRBCs (Packed Red Blood Cells): 346 mL  Total IN: 2514.4 mL    OUT:    Chest Tube (mL): 98 mL    Chest Tube (mL): 175 mL    Dexmedetomidine: 0 mL    Indwelling Catheter - Urethral (mL): 1142 mL    Norepinephrine: 0 mL  Total OUT: 1415 mL    Total NET: 1099.4 mL    PHYSICAL EXAM:  General: NAD, AAOx3, calm and cooperative  HEENT: NCAT, MARY JANE, EOMI, Trachea ML, Neck supple  Cardiac: RRR S1, S2, no Murmurs, rubs or gallops  Respiratory: CTAB, normal respiratory effort, breath sounds equal BL, no wheeze, rhonchi or crackles  Abdomen: Soft, non-distended, non-tender, no rebound, no guarding. +BS.  Rectal: Good tone, +stool, no blood, no kate-anal masses/lesions, no fistulas, fissures, hemorrhoids  Musculoskeletal: Strength 5/5 BL UE/LE, ROM intact, compartments soft  Neuro: Sensation grossly intact and equal throughout, no focal deficits  Vascular: Pulses 2+ throughout, extremities well perfused  Skin: Warm/dry, normal color, no jaundice  Incision/wound: healing well, dressings in place, clean, dry and intact    MEDICATIONS  (STANDING):  albuterol    90 MICROgram(s) HFA Inhaler 4 Puff(s) Inhalation every 6 hours  aspirin  chewable 81 milliGRAM(s) Oral daily  atorvastatin 20 milliGRAM(s) Oral at bedtime  atropine Injectable 0.6 milliGRAM(s) IV Push once  cefTRIAXone   IVPB 2000 milliGRAM(s) IV Intermittent every 24 hours  chlorhexidine 0.12% Liquid 15 milliLiter(s) Oral Mucosa two times a day  chlorhexidine 2% Cloths 1 Application(s) Topical <User Schedule>  clonazePAM  Tablet 0.5 milliGRAM(s) Oral daily  dexMEDEtomidine Infusion 0.2 MICROgram(s)/kG/Hr (4.54 mL/Hr) IV Continuous <Continuous>  fentaNYL   Infusion. 0.501 MICROgram(s)/kG/Hr (4.54 mL/Hr) IV Continuous <Continuous>  gabapentin 400 milliGRAM(s) Oral three times a day  heparin   Injectable 5000 Unit(s) SubCutaneous every 8 hours  ipratropium 17 MICROgram(s) HFA Inhaler 4 Puff(s) Inhalation every 6 hours  lactulose Syrup 20 Gram(s) Oral every 6 hours  lamoTRIgine 100 milliGRAM(s) Oral two times a day  midazolam Infusion 0.02 mG/kG/Hr (1.81 mL/Hr) IV Continuous <Continuous>  montelukast 10 milliGRAM(s) Oral daily  norepinephrine Infusion 0.05 MICROgram(s)/kG/Min (4.25 mL/Hr) IV Continuous <Continuous>  pantoprazole   Suspension 40 milliGRAM(s) Oral daily  polyethylene glycol 3350 17 Gram(s) Oral every 12 hours  QUEtiapine 250 milliGRAM(s) Oral at bedtime  senna 2 Tablet(s) Oral at bedtime    MEDICATIONS  (PRN):  fentaNYL    Injectable 25 MICROGram(s) IV Push every 6 hours PRN Severe Pain (7 - 10)      DVT PROPHYLAXIS: heparin   Injectable 5000 Unit(s) SubCutaneous every 8 hours    GI PROPHYLAXIS: pantoprazole   Suspension 40 milliGRAM(s) Oral daily    ANTICOAGULATION:   ANTIBIOTICS:  cefTRIAXone   IVPB 2000 milliGRAM(s)    LAB/STUDIES:  Labs:  CAPILLARY BLOOD GLUCOSE      POCT Blood Glucose.: 110 mg/dL (21 Dec 2022 18:05)  POCT Blood Glucose.: 143 mg/dL (21 Dec 2022 13:21)                          9.5    5.08  )-----------( 187      ( 22 Dec 2022 04:30 )             31.4       Auto Neutrophil %: 91.3 % (12-22-22 @ 04:30)  Auto Immature Granulocyte %: 0.8 % (12-22-22 @ 04:30)    12-22    144  |  114<H>  |  28<H>  ----------------------------<  148<H>  5.5<H>   |  25  |  0.8      Calcium, Total Serum: 7.7 mg/dL (12-22-22 @ 04:30)      LFTs:             6.0  | 0.2  | 647      ------------------[284     ( 22 Dec 2022 04:30 )  2.9  | x    | 760         Lipase:x      Amylase:x         Blood Gas Arterial, Lactate: 1.20 mmol/L (12-22-22 @ 08:24)  Blood Gas Arterial, Lactate: 1.00 mmol/L (12-22-22 @ 04:05)  Blood Gas Arterial, Lactate: 1.10 mmol/L (12-21-22 @ 03:26)  Blood Gas Arterial, Lactate: 0.80 mmol/L (12-20-22 @ 12:44)  Blood Gas Arterial, Lactate: 1.40 mmol/L (12-20-22 @ 05:30)  Blood Gas Arterial, Lactate: 1.00 mmol/L (12-20-22 @ 03:10)    ABG - ( 22 Dec 2022 08:24 )  pH: 7.34  /  pCO2: 47    /  pO2: 126   / HCO3: 25    / Base Excess: -0.8  /  SaO2: 98.4            ABG - ( 22 Dec 2022 04:05 )  pH: 7.28  /  pCO2: 52    /  pO2: 78    / HCO3: 24    / Base Excess: -3.0  /  SaO2: 96.1            ABG - ( 21 Dec 2022 03:26 )  pH: 7.35  /  pCO2: 41    /  pO2: 86    / HCO3: 23    / Base Excess: -2.9  /  SaO2: 98.4              Coags:     19.40  ----< 1.67    ( 21 Dec 2022 12:07 )     26.0            Serum Pro-Brain Natriuretic Peptide: 592 pg/mL (12-15-22 @ 11:30)          Culture - Blood (collected 19 Dec 2022 12:20)  Source: .Blood Blood  Preliminary Report (20 Dec 2022 23:01):    No growth to date.    IMAGING:  < from: Xray Chest 1 View- PORTABLE-Routine (Xray Chest 1 View- PORTABLE-Routine in AM.) (12.22.22 @ 05:43) >  ACC: [id number] EXAM:  XR CHEST PORTABLE ROUTINE 1V                          PROCEDURE DATE:  12/22/2022          INTERPRETATION:  STUDY INDICATION: Shortness of Breath    TECHNIQUE:  Portable frontal view of the chest obtained.    COMPARISON: 12/21/2022    FINDINGS/  IMPRESSION:    ET tube is 3 to 4 cm above the guille. NG tube coursing below left   hemidiaphragm out of field-of-view. Unchanged right chest tubes.   Unchanged right IJ venous catheter.    Small right pneumothorax without significant change. Bilateral opacities   without significant change.    Stable cardiomediastinal silhouette.    Unchanged osseous structures.    < end of copied text >      ASSESSMENT:  77y F w/ right pneumothorax, s/p pigtail and chest tube placement     PLAN:  keep Chest tube and pigtail to suction  Daily AM CXR  monitor Chest tube and pigtail outputs  monitor for airleak  monitor O2 sat      Lines/Tubes: PIV, Midline, Central Line, A-Line, Chest tubes, Michele/ISAIAS drains, Freeman Catheter.    GREEN TEAM SPECTRA: [id number]

## 2022-12-22 NOTE — PROCEDURAL SAFETY CHECKLIST WITH OR WITHOUT SEDATION - NSPROCEDPERFORMDFREE_GEN_ALL_CORE
bronchoscopy with endobroncheal valve placement
insertion of central venous catheter
insertion of femoral cherelle
Right IJ central line insertion
Right sided chest tube
right sided chest tube insertion
insertion of left chest tube

## 2022-12-22 NOTE — PROCEDURAL SAFETY CHECKLIST WITH OR WITHOUT SEDATION - NSPREPROCEDSEDAT_GEN_ALL_CORE
with sedation
without sedation
without sedation
with sedation
without sedation

## 2022-12-22 NOTE — PROGRESS NOTE ADULT - ASSESSMENT
IMPRESSION:    Acute hypoxemic respiratory failure   Right pneumothorax SP pig tail.  Persistent air leak and non expanding lung   Septic Shock / obstructive shock resolved   Rapid Afib now NSR   COPD exacerbation   HO Chronic hypoxemic respiratory failure on home O2     PLAN:    CNS:  SAT post bronchoscopy  Klonopin     HEENT: Oral care.  ET care     PULMONARY:  HOB @ 45 degrees.  Vent changes:  PEEP 5.  Wean O2.  Monitor PPL, DP, and auto PEEP.  Solumedrol 40 mg Q24.  Nebs Q4 and PRN.  DW CTS,  Endobronchial valve today     CARDIOVASCULAR:  Goal directed fluid resuscitation.        GI: GI prophylaxis.  OG Feeding once stabilized.  Bowel regimen     RENAL:  Follow up lytes.  Correct as needed    INFECTIOUS DISEASE: Follow up cultures.  Continue ABX for now.     HEMATOLOGICAL:  DVT prophylaxis.  Hold Eliquis.  LMWH once stable.  FU HIT and DIC panel     ENDOCRINE:  Follow up FS.  Insulin protocol if needed.     MUSCULOSKELETAL:  Bed rest     A line    Freeman     Overall prognosis poor     DW Family at length

## 2022-12-22 NOTE — PROCEDURAL SAFETY CHECKLIST WITH OR WITHOUT SEDATION - NSPRESEDATIONFT_GEN_ALL_CORE
Physician confirms case reviewed for anesthesia consultation requirements.

## 2022-12-22 NOTE — PROCEDURAL SAFETY CHECKLIST WITH OR WITHOUT SEDATION - NSPX2BRECORDED_GEN_ALL_CORE
insertion of femoral cherelle
bronchoscopy with endobroncheal valve placement
insertion of central venous catheter
insertion of left chest tube
Right side chest tube
Central line insertion Right IJ

## 2022-12-22 NOTE — ED ADULT NURSE NOTE - IN THE PAST 12 MONTHS HAVE YOU USED DRUGS OTHER THAN THOSE REQUIRED FOR MEDICAL REASON?
Patient is a 77y old  Female who presents with a chief complaint of PNEUMOTHORAX, RIGHT     (21 Dec 2022 10:59)        Over Night Events:  Remains critically ill on MV.  Sedated.  Off pressors.  SP one Unit PRBC         ROS:     All ROS are negative except HPI         PHYSICAL EXAM    ICU Vital Signs Last 24 Hrs  T(C): 37.1 (22 Dec 2022 08:00), Max: 37.1 (22 Dec 2022 06:00)  T(F): 98.8 (22 Dec 2022 08:00), Max: 98.8 (22 Dec 2022 06:00)  HR: 76 (22 Dec 2022 08:00) (60 - 110)  BP: --  BP(mean): --  ABP: 136/60 (22 Dec 2022 08:00) (106/48 - 146/66)  ABP(mean): 86 (22 Dec 2022 08:00) (70 - 96)  RR: 28 (22 Dec 2022 08:00) (27 - 28)  SpO2: 100% (22 Dec 2022 08:00) (55% - 100%)    O2 Parameters below as of 22 Dec 2022 08:00  Patient On (Oxygen Delivery Method): ventilator    O2 Concentration (%): 100        CONSTITUTIONAL:  In NAD    ENT:   Airway patent,   Mouth with normal mucosa.       EYES:   Pupils equal,   Round and reactive to light.    CARDIAC:   Normal rate,   Regular rhythm.      RESPIRATORY:   No wheezing  Bilateral BS  Normal chest expansion  Not tachypneic,  No use of accessory muscles    GASTROINTESTINAL:  Abdomen soft,   Non-tender,   No guarding,   + BS    MUSCULOSKELETAL:   Range of motion is not limited,  No clubbing, cyanosis    NEUROLOGICAL:   Sedated     SKIN:   Skin normal color for race,   No evidence of rash.    PSYCHIATRIC:   Sedated   No apparent risk to self or others.          12-21-22 @ 07:01  -  12-22-22 @ 07:00  --------------------------------------------------------  IN:    Enteral Tube Flush: 450 mL    FentaNYL: 419 mL    Free Water: 350 mL    IV PiggyBack: 50 mL    Jevity 1.2: 795 mL    Midazolam: 104.4 mL    PRBCs (Packed Red Blood Cells): 346 mL  Total IN: 2514.4 mL    OUT:    Chest Tube (mL): 98 mL    Chest Tube (mL): 175 mL    Dexmedetomidine: 0 mL    Indwelling Catheter - Urethral (mL): 1142 mL    Norepinephrine: 0 mL  Total OUT: 1415 mL    Total NET: 1099.4 mL      12-22-22 @ 07:01  -  12-22-22 @ 08:16  --------------------------------------------------------  IN:    FentaNYL: 9.1 mL    Midazolam: 4.5 mL  Total IN: 13.6 mL    OUT:  Total OUT: 0 mL    Total NET: 13.6 mL          LABS:                            9.5    5.08  )-----------( 187      ( 22 Dec 2022 04:30 )             31.4                                               12-22    144  |  114<H>  |  28<H>  ----------------------------<  148<H>  5.5<H>   |  25  |  0.8    Ca    7.7<L>      22 Dec 2022 04:30  Phos  1.6     12-22  Mg     2.5     12-22    TPro  6.0  /  Alb  2.9<L>  /  TBili  0.2  /  DBili  x   /  AST  647<H>  /  ALT  760<H>  /  AlkPhos  284<H>  12-22      PT/INR - ( 21 Dec 2022 12:07 )   PT: 19.40 sec;   INR: 1.67 ratio         PTT - ( 21 Dec 2022 12:07 )  PTT:26.0 sec                                                                                     LIVER FUNCTIONS - ( 22 Dec 2022 04:30 )  Alb: 2.9 g/dL / Pro: 6.0 g/dL / ALK PHOS: 284 U/L / ALT: 760 U/L / AST: 647 U/L / GGT: x                                                  Culture - Blood (collected 19 Dec 2022 12:20)  Source: .Blood Blood  Preliminary Report (20 Dec 2022 23:01):    No growth to date.                                                   Mode: AC/ CMV (Assist Control/ Continuous Mandatory Ventilation)  RR (machine): 28  TV (machine): 350  FiO2: 60  PEEP: 5  ITime: 1  MAP: 11  PIP: 18                                      ABG - ( 22 Dec 2022 04:05 )  pH, Arterial: 7.28  pH, Blood: x     /  pCO2: 52    /  pO2: 78    / HCO3: 24    / Base Excess: -3.0  /  SaO2: 96.1                MEDICATIONS  (STANDING):  albuterol    90 MICROgram(s) HFA Inhaler 4 Puff(s) Inhalation every 6 hours  aspirin  chewable 81 milliGRAM(s) Oral daily  atorvastatin 20 milliGRAM(s) Oral at bedtime  atropine Injectable 0.6 milliGRAM(s) IV Push once  cefTRIAXone   IVPB 2000 milliGRAM(s) IV Intermittent every 24 hours  chlorhexidine 0.12% Liquid 15 milliLiter(s) Oral Mucosa two times a day  chlorhexidine 2% Cloths 1 Application(s) Topical <User Schedule>  clonazePAM  Tablet 0.5 milliGRAM(s) Oral daily  dexMEDEtomidine Infusion 0.2 MICROgram(s)/kG/Hr (4.54 mL/Hr) IV Continuous <Continuous>  fentaNYL   Infusion. 0.501 MICROgram(s)/kG/Hr (4.54 mL/Hr) IV Continuous <Continuous>  gabapentin 400 milliGRAM(s) Oral three times a day  ipratropium 17 MICROgram(s) HFA Inhaler 4 Puff(s) Inhalation every 6 hours  lactulose Syrup 20 Gram(s) Oral every 6 hours  lamoTRIgine 100 milliGRAM(s) Oral two times a day  methylPREDNISolone sodium succinate Injectable 60 milliGRAM(s) IV Push every 24 hours  midazolam Infusion 0.02 mG/kG/Hr (1.81 mL/Hr) IV Continuous <Continuous>  montelukast 10 milliGRAM(s) Oral daily  norepinephrine Infusion 0.05 MICROgram(s)/kG/Min (4.25 mL/Hr) IV Continuous <Continuous>  pantoprazole   Suspension 40 milliGRAM(s) Oral daily  polyethylene glycol 3350 17 Gram(s) Oral every 12 hours  QUEtiapine 250 milliGRAM(s) Oral at bedtime  senna 2 Tablet(s) Oral at bedtime    MEDICATIONS  (PRN):  fentaNYL    Injectable 25 MICROGram(s) IV Push every 6 hours PRN Severe Pain (7 - 10)      New X-rays reviewed:                                                                                  ECHO       No

## 2022-12-23 NOTE — PROGRESS NOTE ADULT - ASSESSMENT
IMPRESSION:    Acute hypoxemic respiratory failure   Right pneumothorax SP pig tail.  Persistent air leak and non expanding lung SP endobronchial valves   Septic Shock / obstructive shock resolved   Rapid Afib now NSR   COPD exacerbation   HO Chronic hypoxemic respiratory failure on home O2     PLAN:    CNS:  SAT.  Continue  Klonopin     HEENT: Oral care.  ET care     PULMONARY:  HOB @ 45 degrees.  Vent changes:  PEEP 5.  Wean O2.  Monitor PPL, DP, and auto PEEP.  Solumedrol 40 mg Q24.  Nebs Q4 and PRN.  Chest tubes to water seal      CARDIOVASCULAR:  Goal directed fluid resuscitation.        GI: GI prophylaxis.  OG Feeding once stabilized.  Bowel regimen     RENAL:  Follow up lytes.  Correct as needed    INFECTIOUS DISEASE: Follow up cultures.  Continue ABX for now.     HEMATOLOGICAL:  DVT prophylaxis.  Hold Eliquis.  LMWH once stable.  HIT negative.      ENDOCRINE:  Follow up FS.  Insulin protocol if needed.     MUSCULOSKELETAL:  Bed rest     A line    Freeman     Overall prognosis poor     DW Family at length

## 2022-12-23 NOTE — PROGRESS NOTE ADULT - ASSESSMENT
ASSESSMENT:  77y F w/ PMHx of O2 dependent COPD, A fib on Eliquis, Hx R pleurodesis d/t PTX in the past, CHF, BP1, depression, FLAVIO on CPAP, PVD, carotid angioplasty, cholecystectomy, TIA x 3, and Hx COVID requiring Intubation. She was admitted on 12/15/22 with R chest pain/SOB, and found to have right loculated PTX.     #Right loculated PTX, recurrent issue  -S/p right pigtail on 12/15. S/p R 20FR chest tube on 12/19.   -Due to persistent air leak and non expanding lung, s/p bronchoscopy with right upper lobe endobronchial valves placed on 12/22.   -Repeat CXR on 12/23 pending. Persistent air leak noted on exam     #Acute/chronic hypoxic respiratory failure - related to above, in the setting of underlying advanced COPD and FLAVIO. On 50% FiO2, 5 of PEEP    #O2 dependent COPD and COPD exacerbation   -On Solumedrol     #A fib with RVR, s/p lopressor and amiodarone bolus   -on Eliquis at home, therapeutic AC held for bronch     #HTN     #Hx TIA x 3 - on ASA    PLAN:  -F/u CXR for 12/23. continue monitoring daily CXRs   -Continue chest tube to suction, and continue pigtail catheter - both until Tuesday 12/27. Continue to monitor air leak while on vent.   -monitor chest tube outputs   -therapeutic AC on hold. Right now, on heparin prophylaxis   -on protonix for GI prophylaxis   -on TFs via OG tube   -on bowel regimen   -HTN management per primary team     Lines/Tubes: PIV, A line, stephens catheter, OG tube, right chest tube, right pigtail catheter     Spectra [id number]

## 2022-12-23 NOTE — PROGRESS NOTE ADULT - SUBJECTIVE AND OBJECTIVE BOX
GENERAL SURGERY PROGRESS NOTE    Patient: SHAUN COATES , 77y (49)Female   MRN: [id number]  Location: [facility] (formerly [facility])  A  Visit: 12-15-22 Inpatient  Date: 22 @ 09:27    Hospital Day #: 9    Dx: Loculated right PTX  Procedures:   -S/p R pigtail on 12/15/22   -S/p 20FR R chest tube   -S/p right endobronchial valve placement on 22     Events of past 24 hours: On , underwent bronchoscopy with placement of right upper lobe endobronchial valves. On , remains intubated and sedated on vent at 50% FiO2, 5 of PEEP. No pressors. Persistent airleak on CT noted morning of . Hypertensive on .     PAST MEDICAL & SURGICAL HISTORY:  Hypertension  Depression  COPD (chronic obstructive pulmonary disease)  Other cardiomyopathy  Other emphysema  PVD (peripheral vascular disease)  Bipolar 1 disorder  FLAVIO on CPAP  Transient ischemic attackx 3  Congestive heart failure  Falls  2019 novel coronavirus disease (COVID-19)  Respiratory failure requiring intubation  Afib  HLD (hyperlipidemia)  History of cholecystectomy  H/O carotid angioplasty    Vitals:   T(F): 100.2 (22 @ 08:00), Max: 100.2 (22 @ 08:00)  HR: 94 (22 @ 09:00)  BP: --  RR: 29 (22 @ 09:00)  SpO2: 99% (22 @ 09:00)  Mode: AC/ CMV (Assist Control/ Continuous Mandatory Ventilation), RR (machine): 28, TV (machine): 350, FiO2: 40, PEEP: 5, ITime: 1, MAP: 12, PIP: 26    Diet, NPO with Tube Feed:   Tube Feeding Modality: Orogastric  Jevity 1.2 Wilver  Total Volume for 24 Hours (mL): 1320  Continuous  Starting Tube Feed Rate mL per Hour: 10  Increase Tube Feed Rate by (mL): 10     Every 4 hours  Until Goal Tube Feed Rate (mL per Hour): 55  Tube Feed Duration (in Hours): 24  Tube Feed Start Time: 18:00  Free Water Flush  Bolus   Total Volume per Flush (mL): 200   Frequency: Every 6 Hours    Fluids:     I & O's:    22 @ 07:22 @ 07:00  --------------------------------------------------------  IN:    FentaNYL: 218.4 mL    Free Water: 800 mL    IV PiggyBack: 300 mL    Jevity 1.2: 1045 mL    Midazolam: 60.3 mL  Total IN: 2423.7 mL    OUT:    Chest Tube (mL): 12 mL    Chest Tube (mL): 30 mL    Indwelling Catheter - Urethral (mL): 1290 mL  Total OUT: 1332 mL    Total NET: 1091.7 mL    PHYSICAL EXAM:  General: intubated and sedated on vent.   HEENT: neck supple, MMs moist, no lip stomatitis   Cardiac: RRR S1, S2, no Murmurs, rubs or gallops  Respiratory: CTA on left. R chest tube and pigtail in place on right - ongoing airleak involving R chest tube. No crepitus at sites.   Abdomen: Soft, non-distended   Neuro: no gross neuro deficits, difficult to assess, sedated on vent.   Vascular: extremities well perfused  Skin: Warm/dry, normal color, no jaundice    MEDICATIONS  (STANDING):  albuterol    90 MICROgram(s) HFA Inhaler 4 Puff(s) Inhalation every 6 hours  aspirin  chewable 81 milliGRAM(s) Oral daily  atorvastatin 20 milliGRAM(s) Oral at bedtime  atropine Injectable 0.6 milliGRAM(s) IV Push once  cefTRIAXone   IVPB 2000 milliGRAM(s) IV Intermittent every 24 hours  chlorhexidine 0.12% Liquid 15 milliLiter(s) Oral Mucosa two times a day  chlorhexidine 2% Cloths 1 Application(s) Topical <User Schedule>  clonazePAM  Tablet 0.5 milliGRAM(s) Oral daily  dexMEDEtomidine Infusion 0.2 MICROgram(s)/kG/Hr (4.54 mL/Hr) IV Continuous <Continuous>  fentaNYL   Infusion. 0.501 MICROgram(s)/kG/Hr (4.54 mL/Hr) IV Continuous <Continuous>  furosemide   Injectable 40 milliGRAM(s) IV Push once  gabapentin 400 milliGRAM(s) Oral three times a day  heparin   Injectable 5000 Unit(s) SubCutaneous every 8 hours  ipratropium 17 MICROgram(s) HFA Inhaler 4 Puff(s) Inhalation every 6 hours  lactulose Syrup 20 Gram(s) Oral every 6 hours  lamoTRIgine 100 milliGRAM(s) Oral two times a day  methylPREDNISolone sodium succinate Injectable 40 milliGRAM(s) IV Push daily  midazolam Infusion 0.02 mG/kG/Hr (1.81 mL/Hr) IV Continuous <Continuous>  montelukast 10 milliGRAM(s) Oral daily  norepinephrine Infusion 0.05 MICROgram(s)/kG/Min (4.25 mL/Hr) IV Continuous <Continuous>  pantoprazole   Suspension 40 milliGRAM(s) Oral daily  polyethylene glycol 3350 17 Gram(s) Oral every 12 hours  QUEtiapine 250 milliGRAM(s) Oral at bedtime  senna 2 Tablet(s) Oral at bedtime    MEDICATIONS  (PRN):  fentaNYL    Injectable 25 MICROGram(s) IV Push every 6 hours PRN Severe Pain (7 - 10)    DVT PROPHYLAXIS: heparin   Injectable 5000 Unit(s) SubCutaneous every 8 hours    GI PROPHYLAXIS: pantoprazole   Suspension 40 milliGRAM(s) Oral daily    ANTICOAGULATION:   ANTIBIOTICS:  cefTRIAXone   IVPB 2000 milliGRAM(s)    LAB/STUDIES:  Labs:  CAPILLARY BLOOD GLUCOSE                        9.4    7.36  )-----------( 194      ( 23 Dec 2022 04:20 )             30.6       Auto Neutrophil %: 77.5 % (22 @ 04:20)  Auto Immature Granulocyte %: 0.5 % (22 @ 04:20)        142  |  112<H>  |  24<H>  ----------------------------<  137<H>  4.6   |  27  |  0.8      Calcium, Total Serum: 8.1 mg/dL (22 @ 04:20)      LFTs:             6.1  | 0.2  | 395      ------------------[263     ( 23 Dec 2022 04:20 )  2.9  | x    | 611         Lipase:x      Amylase:x         Blood Gas Arterial, Lactate: 1.10 mmol/L (22 @ 03:12)  Blood Gas Arterial, Lactate: 1.30 mmol/L (22 @ 14:29)  Blood Gas Arterial, Lactate: 1.20 mmol/L (22 @ 08:24)  Blood Gas Arterial, Lactate: 1.00 mmol/L (22 @ 04:05)  Blood Gas Arterial, Lactate: 1.10 mmol/L (22 @ 03:26)  Blood Gas Arterial, Lactate: 0.80 mmol/L (22 @ 12:44)    ABG - ( 23 Dec 2022 03:12 )  pH: 7.33  /  pCO2: 47    /  pO2: 133   / HCO3: 25    / Base Excess: -1.5  /  SaO2: 98.8      ABG - ( 22 Dec 2022 14:29 )  pH: 7.35  /  pCO2: 44    /  pO2: 84    / HCO3: 24    / Base Excess: -1.5  /  SaO2: 99.1      ABG - ( 22 Dec 2022 08:24 )  pH: 7.34  /  pCO2: 47    /  pO2: 126   / HCO3: 25    / Base Excess: -0.8  /  SaO2: 98.4        Coags:  19.40  ----< 1.67    ( 21 Dec 2022 12:07 )     26.0        IMAGIN/20 CT Chest: IMPRESSION: Since 12/15/2022  Decreasing large right tension pneumothorax, with lung collapse as above.   Decrease in leftward mediastinal shift. Interval placement of right   pigtail catheter, which terminates in the right upper lung.  Emphysematous changes of the left lung with interstitial edema and lower   lobe atelectatic changes.  Significant right-sided subcutaneous soft tissue emphysematous changes.  Multinodular thyroid gland, one of which is calcified (5-14). Recommend   dedicated ultrasound of the thyroid as an outpatient if clinically   indicated.     CXR Impression:  Unchanged right pneumothorax, bilateral opacities.  Unchanged support devices.    ACCESS/ DEVICES:  [X] Peripheral IV  [X ] Urinary Catheter  [X] Chest tube: [ X] Right (CT and pigtail)

## 2022-12-23 NOTE — PROGRESS NOTE ADULT - SUBJECTIVE AND OBJECTIVE BOX
Patient is a 77y old  Female who presents with a chief complaint of Right chest pain now s/p pigtail 12/15 and s/p 20F right chest tube for R loculated pneumothorax (22 Dec 2022 12:08)        Over Night Events:  Remains critically ill on MV.  Sedated.  off pressors.  SP Bronchoscopy and endobronchial valves RUL         ROS:     All ROS are negative except HPI         PHYSICAL EXAM    ICU Vital Signs Last 24 Hrs  T(C): 37.9 (23 Dec 2022 08:00), Max: 37.9 (23 Dec 2022 08:00)  T(F): 100.2 (23 Dec 2022 08:00), Max: 100.2 (23 Dec 2022 08:00)  HR: 94 (23 Dec 2022 08:00) (68 - 114)  BP: --  BP(mean): --  ABP: 156/56 (23 Dec 2022 08:00) (104/62 - 196/80)  ABP(mean): 88 (23 Dec 2022 08:00) (68 - 126)  RR: 28 (23 Dec 2022 08:00) (26 - 34)  SpO2: 99% (23 Dec 2022 08:00) (89% - 100%)    O2 Parameters below as of 23 Dec 2022 08:00  Patient On (Oxygen Delivery Method): ventilator    O2 Concentration (%): 50        CONSTITUTIONAL:  IN NAD    ENT:   Airway patent,   Mouth with normal mucosa.     EYES:   Pupils equal,   Round and reactive to light.    CARDIAC:   Normal rate,   Regular rhythm.        RESPIRATORY:   No wheezing  Bilateral BS  Normal chest expansion  Not tachypneic,  No use of accessory muscles    GASTROINTESTINAL:  Abdomen soft,   Non-tender,   No guarding,   + BS    MUSCULOSKELETAL:   Range of motion is not limited,  No clubbing, cyanosis    NEUROLOGICAL:   Sedated   No motor  deficits.    SKIN:   Skin normal color for race,   No evidence of rash.    PSYCHIATRIC:   Sedated   No apparent risk to self or others.        12-22-22 @ 07:01  -  12-23-22 @ 07:00  --------------------------------------------------------  IN:    FentaNYL: 218.4 mL    Free Water: 800 mL    IV PiggyBack: 300 mL    Jevity 1.2: 1045 mL    Midazolam: 60.3 mL  Total IN: 2423.7 mL    OUT:    Chest Tube (mL): 12 mL    Chest Tube (mL): 30 mL    Indwelling Catheter - Urethral (mL): 1290 mL  Total OUT: 1332 mL    Total NET: 1091.7 mL      12-23-22 @ 07:01  -  12-23-22 @ 08:51  --------------------------------------------------------  IN:    Jevity 1.2: 110 mL  Total IN: 110 mL    OUT:    FentaNYL: 0 mL    Indwelling Catheter - Urethral (mL): 80 mL    Midazolam: 0 mL  Total OUT: 80 mL    Total NET: 30 mL          LABS:                            9.4    7.36  )-----------( 194      ( 23 Dec 2022 04:20 )             30.6                                               12-23    142  |  112<H>  |  24<H>  ----------------------------<  137<H>  4.6   |  27  |  0.8    Ca    8.1<L>      23 Dec 2022 04:20  Phos  1.1     12-23  Mg     2.5     12-23    TPro  6.1  /  Alb  2.9<L>  /  TBili  0.2  /  DBili  x   /  AST  395<H>  /  ALT  611<H>  /  AlkPhos  263<H>  12-23      PT/INR - ( 21 Dec 2022 12:07 )   PT: 19.40 sec;   INR: 1.67 ratio         PTT - ( 21 Dec 2022 12:07 )  PTT:26.0 sec                                                                                     LIVER FUNCTIONS - ( 23 Dec 2022 04:20 )  Alb: 2.9 g/dL / Pro: 6.1 g/dL / ALK PHOS: 263 U/L / ALT: 611 U/L / AST: 395 U/L / GGT: x                                                                                               Mode: AC/ CMV (Assist Control/ Continuous Mandatory Ventilation)  RR (machine): 28  TV (machine): 350  FiO2: 50  PEEP: 5  ITime: 1  MAP: 12  PIP: 22                                      ABG - ( 23 Dec 2022 03:12 )  pH, Arterial: 7.33  pH, Blood: x     /  pCO2: 47    /  pO2: 133   / HCO3: 25    / Base Excess: -1.5  /  SaO2: 98.8                MEDICATIONS  (STANDING):  albuterol    90 MICROgram(s) HFA Inhaler 4 Puff(s) Inhalation every 6 hours  aspirin  chewable 81 milliGRAM(s) Oral daily  atorvastatin 20 milliGRAM(s) Oral at bedtime  atropine Injectable 0.6 milliGRAM(s) IV Push once  cefTRIAXone   IVPB 2000 milliGRAM(s) IV Intermittent every 24 hours  chlorhexidine 0.12% Liquid 15 milliLiter(s) Oral Mucosa two times a day  chlorhexidine 2% Cloths 1 Application(s) Topical <User Schedule>  clonazePAM  Tablet 0.5 milliGRAM(s) Oral daily  dexMEDEtomidine Infusion 0.2 MICROgram(s)/kG/Hr (4.54 mL/Hr) IV Continuous <Continuous>  fentaNYL   Infusion. 0.501 MICROgram(s)/kG/Hr (4.54 mL/Hr) IV Continuous <Continuous>  gabapentin 400 milliGRAM(s) Oral three times a day  heparin   Injectable 5000 Unit(s) SubCutaneous every 8 hours  ipratropium 17 MICROgram(s) HFA Inhaler 4 Puff(s) Inhalation every 6 hours  lactulose Syrup 20 Gram(s) Oral every 6 hours  lamoTRIgine 100 milliGRAM(s) Oral two times a day  methylPREDNISolone sodium succinate Injectable 40 milliGRAM(s) IV Push daily  midazolam Infusion 0.02 mG/kG/Hr (1.81 mL/Hr) IV Continuous <Continuous>  montelukast 10 milliGRAM(s) Oral daily  norepinephrine Infusion 0.05 MICROgram(s)/kG/Min (4.25 mL/Hr) IV Continuous <Continuous>  pantoprazole   Suspension 40 milliGRAM(s) Oral daily  polyethylene glycol 3350 17 Gram(s) Oral every 12 hours  QUEtiapine 250 milliGRAM(s) Oral at bedtime  senna 2 Tablet(s) Oral at bedtime    MEDICATIONS  (PRN):  fentaNYL    Injectable 25 MICROGram(s) IV Push every 6 hours PRN Severe Pain (7 - 10)      New X-rays reviewed:                                                                                  ECHO

## 2022-12-23 NOTE — PROGRESS NOTE ADULT - SUBJECTIVE AND OBJECTIVE BOX
SHAUN COATES  77y  Female      Patient is a 77y old  Female who presents with a chief complaint of Right chest pain (23 Dec 2022 09:26)      INTERVAL HPI/OVERNIGHT EVENTS:  No overnight events     REVIEW OF SYSTEMS:  unobtainable     T(C): 37.9 (12-23-22 @ 08:00), Max: 37.9 (12-23-22 @ 08:00)  HR: 84 (12-23-22 @ 11:00) (68 - 114)  BP: --  RR: 48 (12-23-22 @ 11:00) (27 - 48)  SpO2: 100% (12-23-22 @ 11:00) (98% - 100%)  Wt(kg): --Vital Signs Last 24 Hrs  T(C): 37.9 (23 Dec 2022 08:00), Max: 37.9 (23 Dec 2022 08:00)  T(F): 100.2 (23 Dec 2022 08:00), Max: 100.2 (23 Dec 2022 08:00)  HR: 84 (23 Dec 2022 11:00) (68 - 114)  BP: --  BP(mean): --  RR: 48 (23 Dec 2022 11:00) (27 - 48)  SpO2: 100% (23 Dec 2022 11:00) (98% - 100%)    Parameters below as of 23 Dec 2022 10:00  Patient On (Oxygen Delivery Method): ventilator    O2 Concentration (%): 40    PHYSICAL EXAM:  GENERAL: NAD, well-groomed, well-developed  NECK: Supple, No JVD, Normal thyroid  NERVOUS SYSTEM: Sedated   CHEST/LUNG: Clear to percussion bilaterally; No rales, rhonchi, wheezing, or rubs  HEART: Regular rate and rhythm; No murmurs, rubs, or gallops  ABDOMEN: Soft, Nontender, Nondistended; Bowel sounds present  EXTREMITIES:  2+ Peripheral Pulses, No clubbing, cyanosis, edema +  SKIN: No rashes or lesions    Consultant(s) Notes Reviewed:  [x ] YES  [ ] NO  Care Discussed with Consultants/Other Providers [ x] YES  [ ] NO    Labs reviewed   Imaging Reviewed     albuterol    90 MICROgram(s) HFA Inhaler 4 Puff(s) Inhalation every 6 hours  aspirin  chewable 81 milliGRAM(s) Oral daily  atorvastatin 20 milliGRAM(s) Oral at bedtime  atropine Injectable 0.6 milliGRAM(s) IV Push once  cefTRIAXone   IVPB 2000 milliGRAM(s) IV Intermittent every 24 hours  chlorhexidine 0.12% Liquid 15 milliLiter(s) Oral Mucosa two times a day  chlorhexidine 2% Cloths 1 Application(s) Topical <User Schedule>  clonazePAM  Tablet 0.5 milliGRAM(s) Oral daily  dexMEDEtomidine Infusion 0.2 MICROgram(s)/kG/Hr IV Continuous <Continuous>  fentaNYL    Injectable 25 MICROGram(s) IV Push every 6 hours PRN  fentaNYL   Infusion. 0.501 MICROgram(s)/kG/Hr IV Continuous <Continuous>  gabapentin 400 milliGRAM(s) Oral three times a day  heparin   Injectable 5000 Unit(s) SubCutaneous every 8 hours  ipratropium 17 MICROgram(s) HFA Inhaler 4 Puff(s) Inhalation every 6 hours  lactulose Syrup 20 Gram(s) Oral every 6 hours  lamoTRIgine 100 milliGRAM(s) Oral two times a day  methylPREDNISolone sodium succinate Injectable 40 milliGRAM(s) IV Push daily  midazolam Infusion 0.02 mG/kG/Hr IV Continuous <Continuous>  montelukast 10 milliGRAM(s) Oral daily  norepinephrine Infusion 0.05 MICROgram(s)/kG/Min IV Continuous <Continuous>  pantoprazole   Suspension 40 milliGRAM(s) Oral daily  polyethylene glycol 3350 17 Gram(s) Oral every 12 hours  QUEtiapine 250 milliGRAM(s) Oral at bedtime  senna 2 Tablet(s) Oral at bedtime        ASSESSMENT AND PLAN:      77F PMH COPD on 3-4 L nasal cannula at home, A. fib on Eliquis, pneumothorax x2, h/o pleurodesis on the right 15 years ago, CHF, BP1, Depression, HTN, FLAVIO on CPAP, emphysema, PVD, h/o COVID requiring intubation, carotid angioplasty, cholecystectomy, TIA x3, here for evaluation of atraumatic right upper back pain and shortness of breath that began this morning upon awakening around 6 AM.  Patient denies fever, chills, worsening cough, chest pain. Patient was transferred to [facility] from [facility] with complicated PTX.    #Acute hypoxemic respiratory failure   #Right pneumothorax S/P pig tail and surgical chest tube.  Persistent air leak and non expanding lung   #COPD exacerbation; H/O Chronic hypoxemic respiratory failure on home O2   - HOB @ 45 degrees.   - Pt was intubated due to worsening breathlessness and increased work of breathing  - Monitor PPL, DP, and auto PEEP.    - c/w Solumedrol 40 mg Q24.   - c/w Nebs Q4 - albuterol and ipratropium   - DW CTS and large bore Chest tube placed (12/19) superior to the previous pigtail location    - CT chest results noted  - rpt CXR -  lung expanding   - endobronchial valve placed   - DIC negative; HIT - awaited   - restart heparin and aspirin   - SAT today - rpt ABG     #Septic Shock / obstructive shock   - Low BP - requiring levophed and phenyl  - Cultures taken and pt continued on Ceftriaxone - till 12/26/2022  - Procal, cultures awaited - negative to today   - Femoral CVC changed to IJV(12/21/2022) Rt Art. line (12/19)    #Afib with RVR - reverted to sinus after amiodarone bolus - NST now   #Bradycardia    - pt developed RVR with a HR upto 200/min  - 5 mg lopressor stat given - HR controlled to 140's   - started amiodarone - pt reverted back to sinus rhythm with HR  - Pt developed bradycardia and an episode of asystole - non sustained   - Phenylephrine and amiodarone were on hold and levophed continued  - EP - suggest medical management now- outpatient follow up after stable   - pt was on eliquis - withhold for today - acute state and procedures done - hold for now - okay to start prophylactic   - LL doppler negative     #? seizure episode   - c/w clonazepam - possible withdrawal (home dose 0.5 thrice daily)  - eeg done - generalized slow    HTN  - BB - on hold   - low sodium diet   - monitor vitals     #Hx//o of multiple TIA  # h/o of chronic persistent afib  # HLD  - hold metoprolol and Eliquis and statin  - TTE (10/21/21): EF 55-60%, mod pHTN, no valvular or wall motion abn  - tele monitoring     #COPD with chronic respiratory failure   #FLAVIO  - continue nebs Q6H - albuterol and ipratropium  - methypred 40mg Q24H     #Depression/ anxiety   - Seroquel qhs/ hold apple  - clonazepam 0.5mg QD   - lamictal     #OA  - tylenol prn pain 1-3      VTE -  heparin ppx ; consider full AC (for a.fib) tmrw   Gi ppx - protonix  Diet- OG feeds restarted  activity - bedrest   DW Family at length   FULL CODE            SHAUN COATES  77y  Female      Patient is a 77y old  Female who presents with a chief complaint of Right chest pain (23 Dec 2022 09:26)      INTERVAL HPI/OVERNIGHT EVENTS:  No overnight events     REVIEW OF SYSTEMS:  unobtainable     T(C): 37.9 (12-23-22 @ 08:00), Max: 37.9 (12-23-22 @ 08:00)  HR: 84 (12-23-22 @ 11:00) (68 - 114)  BP: --  RR: 48 (12-23-22 @ 11:00) (27 - 48)  SpO2: 100% (12-23-22 @ 11:00) (98% - 100%)  Wt(kg): --Vital Signs Last 24 Hrs  T(C): 37.9 (23 Dec 2022 08:00), Max: 37.9 (23 Dec 2022 08:00)  T(F): 100.2 (23 Dec 2022 08:00), Max: 100.2 (23 Dec 2022 08:00)  HR: 84 (23 Dec 2022 11:00) (68 - 114)  BP: --  BP(mean): --  RR: 48 (23 Dec 2022 11:00) (27 - 48)  SpO2: 100% (23 Dec 2022 11:00) (98% - 100%)    Parameters below as of 23 Dec 2022 10:00  Patient On (Oxygen Delivery Method): ventilator    O2 Concentration (%): 40    PHYSICAL EXAM:  GENERAL: NAD, well-groomed, well-developed  NECK: Supple, No JVD, Normal thyroid  NERVOUS SYSTEM: Sedated   CHEST/LUNG: Clear to percussion bilaterally; No rales, rhonchi, wheezing, or rubs  HEART: Regular rate and rhythm; No murmurs, rubs, or gallops  ABDOMEN: Soft, Nontender, Nondistended; Bowel sounds present  EXTREMITIES:  2+ Peripheral Pulses, No clubbing, cyanosis, edema +  SKIN: No rashes or lesions    Consultant(s) Notes Reviewed:  [x ] YES  [ ] NO  Care Discussed with Consultants/Other Providers [ x] YES  [ ] NO    Labs reviewed   Imaging Reviewed     albuterol    90 MICROgram(s) HFA Inhaler 4 Puff(s) Inhalation every 6 hours  aspirin  chewable 81 milliGRAM(s) Oral daily  atorvastatin 20 milliGRAM(s) Oral at bedtime  atropine Injectable 0.6 milliGRAM(s) IV Push once  cefTRIAXone   IVPB 2000 milliGRAM(s) IV Intermittent every 24 hours  chlorhexidine 0.12% Liquid 15 milliLiter(s) Oral Mucosa two times a day  chlorhexidine 2% Cloths 1 Application(s) Topical <User Schedule>  clonazePAM  Tablet 0.5 milliGRAM(s) Oral daily  dexMEDEtomidine Infusion 0.2 MICROgram(s)/kG/Hr IV Continuous <Continuous>  fentaNYL    Injectable 25 MICROGram(s) IV Push every 6 hours PRN  fentaNYL   Infusion. 0.501 MICROgram(s)/kG/Hr IV Continuous <Continuous>  gabapentin 400 milliGRAM(s) Oral three times a day  heparin   Injectable 5000 Unit(s) SubCutaneous every 8 hours  ipratropium 17 MICROgram(s) HFA Inhaler 4 Puff(s) Inhalation every 6 hours  lactulose Syrup 20 Gram(s) Oral every 6 hours  lamoTRIgine 100 milliGRAM(s) Oral two times a day  methylPREDNISolone sodium succinate Injectable 40 milliGRAM(s) IV Push daily  midazolam Infusion 0.02 mG/kG/Hr IV Continuous <Continuous>  montelukast 10 milliGRAM(s) Oral daily  norepinephrine Infusion 0.05 MICROgram(s)/kG/Min IV Continuous <Continuous>  pantoprazole   Suspension 40 milliGRAM(s) Oral daily  polyethylene glycol 3350 17 Gram(s) Oral every 12 hours  QUEtiapine 250 milliGRAM(s) Oral at bedtime  senna 2 Tablet(s) Oral at bedtime        ASSESSMENT AND PLAN:      77F PMH COPD on 3-4 L nasal cannula at home, A. fib on Eliquis, pneumothorax x2, h/o pleurodesis on the right 15 years ago, CHF, BP1, Depression, HTN, FLAVIO on CPAP, emphysema, PVD, h/o COVID requiring intubation, carotid angioplasty, cholecystectomy, TIA x3, here for evaluation of atraumatic right upper back pain and shortness of breath that began this morning upon awakening around 6 AM.  Patient denies fever, chills, worsening cough, chest pain. Patient was transferred to [facility] from [facility] with complicated PTX.    #Acute hypoxemic respiratory failure   #Right pneumothorax S/P pig tail and surgical chest tube.  Persistent air leak and non expanding lung   #COPD exacerbation; H/O Chronic hypoxemic respiratory failure on home O2   - HOB @ 45 degrees.   - Pt was intubated due to worsening breathlessness and increased work of breathing  - Monitor PPL, DP, and auto PEEP.    - c/w Solumedrol 40 mg Q24.   - c/w Nebs Q4 - albuterol and ipratropium   - DW CTS and large bore Chest tube placed (12/19) superior to the previous pigtail location    - CT chest results noted  - rpt CXR -  lung expanding   - endobronchial valve placed   - DIC negative; HIT - awaited   - restart heparin and aspirin   - SAT today - rpt ABG   - Stat lasix to maintain negative balance    #Septic Shock / obstructive shock   - Low BP - requiring levophed and phenyl  - Cultures taken and pt continued on Ceftriaxone - till 12/26/2022  - Procal, cultures awaited - negative to today   - Femoral CVC changed to IJV(12/21/2022) Rt Art. line (12/19)    #Afib with RVR - reverted to sinus after amiodarone bolus - NST now   #Bradycardia    - pt developed RVR with a HR upto 200/min  - 5 mg lopressor stat given - HR controlled to 140's   - started amiodarone - pt reverted back to sinus rhythm with HR  - Pt developed bradycardia and an episode of asystole - non sustained   - Phenylephrine and amiodarone were on hold and levophed continued  - EP - suggest medical management now- outpatient follow up after stable   - pt was on eliquis - withhold for today - acute state and procedures done - hold for now - okay to start prophylactic   - LL doppler negative     #? seizure episode   - c/w clonazepam - possible withdrawal (home dose 0.5 thrice daily)  - eeg done - generalized slow    HTN  - BB - on hold   - low sodium diet   - monitor vitals     #Hx//o of multiple TIA  # h/o of chronic persistent afib  # HLD  - hold metoprolol and Eliquis and statin  - TTE (10/21/21): EF 55-60%, mod pHTN, no valvular or wall motion abn  - tele monitoring     #COPD with chronic respiratory failure   #FLAVIO  - continue nebs Q6H - albuterol and ipratropium  - methypred 40mg Q24H     #Depression/ anxiety   - Seroquel qhs/ hold apple  - clonazepam 0.5mg QD   - lamictal     #OA  - tylenol prn pain 1-3      VTE -  heparin ppx ; consider full AC (for a.fib) tmrw   Gi ppx - protonix  Diet- OG feeds restarted  activity - bedrest   DW Family at length   FULL CODE

## 2022-12-24 NOTE — PHARMACOTHERAPY INTERVENTION NOTE - INTERVENTION TYPE RECOOMEND
Dose Optimization/Non-renal Dose Adjustment
Therapy Recommended - Med Rec related
Therapy Recommended - Additional therapy
Timing/Frequency of Administration Recommended
Therapy Discontinuation Recommended - No indication

## 2022-12-24 NOTE — PHARMACOTHERAPY INTERVENTION NOTE - COMMENTS
-fentanyl 5000mcg/250 ml, recommended changing to 2500 mcg/250 ml
pt intubated-recommended changing chlorhexidene oral care q12h prn to ATC  -no BM, recommended increasing Miralax to q12h
pt on quetiapine 250mg og bedtime,. recommended checking QTc, 475
recommended d/c lactulose, had several BMs, continue w/ senna &  Miralax
K 3.4-KCl 20meq IV x1, recommended x1 more dose
-no BM, recommended adding senna 2 tabs og bedtime, lactulose 20g og q6h 
-pt intubated, sedated, on pressors, recommended:  -holding amlodipine, metoprolol  -holding lidocaine patch, loratadine, benzonatate, methocarbamol, acetaminophen q6h ATC,   -change pantoprazole tab to suspension  -hold Symbicort & Spiriva, start albuterol & Atrovent HFA 4 puffs q6h

## 2022-12-24 NOTE — PROGRESS NOTE ADULT - SUBJECTIVE AND OBJECTIVE BOX
Over Night Events:        ROS:  See HPI    PHYSICAL EXAM    ICU Vital Signs Last 24 Hrs  T(C): 36.9 (24 Dec 2022 04:00), Max: 36.9 (23 Dec 2022 20:00)  T(F): 98.4 (24 Dec 2022 04:00), Max: 98.4 (23 Dec 2022 20:00)  HR: 98 (24 Dec 2022 08:44) (75 - 116)  BP: 151/61 (24 Dec 2022 07:00) (146/65 - 151/61)  BP(mean): 84 (24 Dec 2022 07:00) (84 - 91)  ABP: 168/94 (24 Dec 2022 07:30) (102/68 - 190/108)  ABP(mean): 126 (24 Dec 2022 07:30) (80 - 134)  RR: 32 (24 Dec 2022 07:30) (26 - 48)  SpO2: 98% (24 Dec 2022 08:44) (98% - 100%)    O2 Parameters below as of 23 Dec 2022 20:00  Patient On (Oxygen Delivery Method): ventilator    O2 Concentration (%): 40        General:  HEENT:                Lymph Nodes: NO cervical LN   Lungs: Bilateral BS  Cardiovascular: Regular   Abdomen: Soft, Positive BS  Extremities: No clubbing   Skin:   Neurological:       LABS:                          9.7    10.55 )-----------( 217      ( 24 Dec 2022 05:15 )             31.4                                               12-24    143  |  107  |  24<H>  ----------------------------<  121<H>  5.0   |  29  |  0.7    Ca    7.5<L>      24 Dec 2022 05:15  Phos  1.7     12-24  Mg     2.3     12-24    TPro  5.5<L>  /  Alb  2.7<L>  /  TBili  0.2  /  DBili  x   /  AST  132<H>  /  ALT  402<H>  /  AlkPhos  237<H>  12-24                                                                                           LIVER FUNCTIONS - ( 24 Dec 2022 05:15 )  Alb: 2.7 g/dL / Pro: 5.5 g/dL / ALK PHOS: 237 U/L / ALT: 402 U/L / AST: 132 U/L / GGT: x                                                                                               Mode: AC/ CMV (Assist Control/ Continuous Mandatory Ventilation)  RR (machine): 28  TV (machine): 350  FiO2: 40  PEEP: 5  ITime: 1  MAP: 12  PIP: 28                                      ABG - ( 24 Dec 2022 03:22 )  pH, Arterial: 7.43  pH, Blood: x     /  pCO2: 44    /  pO2: 81    / HCO3: 29    / Base Excess: 4.2   /  SaO2: 97.4                MEDICATIONS  (STANDING):  albuterol    90 MICROgram(s) HFA Inhaler 4 Puff(s) Inhalation every 6 hours  aspirin  chewable 81 milliGRAM(s) Oral daily  atorvastatin 20 milliGRAM(s) Oral at bedtime  atropine Injectable 0.6 milliGRAM(s) IV Push once  cefTRIAXone   IVPB 2000 milliGRAM(s) IV Intermittent every 24 hours  chlorhexidine 0.12% Liquid 15 milliLiter(s) Oral Mucosa two times a day  chlorhexidine 2% Cloths 1 Application(s) Topical <User Schedule>  clonazePAM  Tablet 0.5 milliGRAM(s) Oral daily  dexMEDEtomidine Infusion 0.2 MICROgram(s)/kG/Hr (4.54 mL/Hr) IV Continuous <Continuous>  fentaNYL   Infusion. 0.501 MICROgram(s)/kG/Hr (4.54 mL/Hr) IV Continuous <Continuous>  gabapentin 400 milliGRAM(s) Oral three times a day  heparin   Injectable 5000 Unit(s) SubCutaneous every 8 hours  ipratropium 17 MICROgram(s) HFA Inhaler 4 Puff(s) Inhalation every 6 hours  lactulose Syrup 20 Gram(s) Oral every 6 hours  lamoTRIgine 100 milliGRAM(s) Oral two times a day  methylPREDNISolone sodium succinate Injectable 40 milliGRAM(s) IV Push daily  midazolam Infusion 0.02 mG/kG/Hr (1.81 mL/Hr) IV Continuous <Continuous>  montelukast 10 milliGRAM(s) Oral daily  norepinephrine Infusion 0.05 MICROgram(s)/kG/Min (4.25 mL/Hr) IV Continuous <Continuous>  pantoprazole   Suspension 40 milliGRAM(s) Oral daily  polyethylene glycol 3350 17 Gram(s) Oral every 12 hours  QUEtiapine 250 milliGRAM(s) Oral at bedtime  senna 2 Tablet(s) Oral at bedtime    MEDICATIONS  (PRN):  fentaNYL    Injectable 25 MICROGram(s) IV Push every 6 hours PRN Severe Pain (7 - 10)      Xrays:    R-PTX, bilateral opacities

## 2022-12-24 NOTE — PROGRESS NOTE ADULT - ASSESSMENT
Assessment and Plan:   · Assessment	  IMPRESSION:    Acute hypoxemic respiratory failure   Right pneumothorax SP pig tail.  Persistent air leak and non expanding lung SP endobronchial valves   Septic Shock / obstructive shock resolved   Rapid Afib now NSR   COPD exacerbation   HO Chronic hypoxemic respiratory failure on home O2     PLAN:    CNS:  SAT.  Continue  Klonopin     HEENT: Oral care.  ET care     PULMONARY:  HOB @ 45 degrees.  Vent changes:  PEEP 5.  Wean O2.  Monitor PPL, DP, and auto PEEP.  Solumedrol 40 mg Q24.  Nebs Q4 and PRN.  Chest tubes to water seal      CARDIOVASCULAR:  Goal directed fluid resuscitation.        GI: GI prophylaxis.  OG Feeding once stabilized.  Bowel regimen     RENAL:  Follow up lytes.  Correct as needed    INFECTIOUS DISEASE: Follow up cultures.  Continue ABX for now.     HEMATOLOGICAL:  DVT prophylaxis.  Hold Eliquis.  LMWH once stable.  HIT negative.      ENDOCRINE:  Follow up FS.  Insulin protocol if needed.     MUSCULOSKELETAL:  Bed rest     A line    Freeman     Overall prognosis poor

## 2022-12-24 NOTE — PROGRESS NOTE ADULT - ASSESSMENT
77y F w/ PMHx of O2 dependent COPD, A fib on Eliquis, Hx R pleurodesis d/t PTX in the past, CHF, BP1, depression, FLAVOI on CPAP, PVD, carotid angioplasty, cholecystectomy, TIA x 3, and Hx COVID requiring Intubation. She was admitted on 12/15/22 with R chest pain/SOB, and found to have right loculated PTX.       PLAN:  - Care per primary team  - Recommend continuing chest tube and pigtail to suction until 12/27  - Monitor chest tube output and air leaks  - Daily AM CXR  - Monitor vitals and labs  - DVT ppx, GI ppx  - Bowel regimen    Spectra [id number]

## 2022-12-24 NOTE — PROGRESS NOTE ADULT - SUBJECTIVE AND OBJECTIVE BOX
GENERAL SURGERY PROGRESS NOTE    Patient: SHAUN COATES , 77y ([birth year])Female   MRN: [id number]  Location: [facility] [address]  Visit: 12-15-22 Inpatient  Date: 12-24-22 @ 01:27    Hospital Day #: 9    Procedure/Dx/Injuries: s/p pigtail 12/15 and s/p 20F right chest tube for R loculated pneumothorax      Events of past 24 hours: Vent settings 40/5; chest tubes to water seal as per pulm.       PAST MEDICAL & SURGICAL HISTORY:  Hypertension      Depression      COPD (chronic obstructive pulmonary disease)      Other cardiomyopathy      Other emphysema      PVD (peripheral vascular disease)      Bipolar 1 disorder      FLAVIO on CPAP      Transient ischemic attack  x 3      Congestive heart failure      Falls      2019 novel coronavirus disease (COVID-19)      Respiratory failure requiring intubation      Afib      HLD (hyperlipidemia)      History of cholecystectomy      H/O carotid angioplasty          Vitals:   T(F): 98.2 (12-24-22 @ 00:00), Max: 100.2 (12-23-22 @ 08:00)  HR: 92 (12-24-22 @ 00:00)  BP: --  RR: 28 (12-24-22 @ 00:00)  SpO2: 100% (12-24-22 @ 00:00)  Mode: AC/ CMV (Assist Control/ Continuous Mandatory Ventilation), RR (machine): 28, TV (machine): 350, FiO2: 40, PEEP: 5, ITime: 1, MAP: 11, PIP: 24    Diet, NPO with Tube Feed:   Tube Feeding Modality: Orogastric  Jevity 1.2 Wilver  Total Volume for 24 Hours (mL): 1320  Continuous  Starting Tube Feed Rate mL per Hour: 10  Increase Tube Feed Rate by (mL): 10     Every 4 hours  Until Goal Tube Feed Rate (mL per Hour): 55  Tube Feed Duration (in Hours): 24  Tube Feed Start Time: 18:00  Free Water Flush  Bolus   Total Volume per Flush (mL): 200   Frequency: Every 6 Hours      Fluids:     I & O's:    12-22-22 @ 07:01  -  12-23-22 @ 07:00  --------------------------------------------------------  IN:    FentaNYL: 218.4 mL    Free Water: 800 mL    IV PiggyBack: 300 mL    Jevity 1.2: 1045 mL    Midazolam: 60.3 mL  Total IN: 2423.7 mL    OUT:    Chest Tube (mL): 12 mL    Chest Tube (mL): 30 mL    Indwelling Catheter - Urethral (mL): 1290 mL  Total OUT: 1332 mL    Total NET: 1091.7 mL      PHYSICAL EXAM:  General: intubated and sedated, on vent  Cardiac: RRR S1, S2  Respiratory: vented 40/5; R chest tube and pigtail in place on right to water seal  Abdomen: Soft, non-distended, non-tender  Vascular: extremities well perfused  Skin: Warm/dry, normal color, no jaundice    MEDICATIONS  (STANDING):  albuterol    90 MICROgram(s) HFA Inhaler 4 Puff(s) Inhalation every 6 hours  aspirin  chewable 81 milliGRAM(s) Oral daily  atorvastatin 20 milliGRAM(s) Oral at bedtime  atropine Injectable 0.6 milliGRAM(s) IV Push once  cefTRIAXone   IVPB 2000 milliGRAM(s) IV Intermittent every 24 hours  chlorhexidine 0.12% Liquid 15 milliLiter(s) Oral Mucosa two times a day  chlorhexidine 2% Cloths 1 Application(s) Topical <User Schedule>  clonazePAM  Tablet 0.5 milliGRAM(s) Oral daily  dexMEDEtomidine Infusion 0.2 MICROgram(s)/kG/Hr (4.54 mL/Hr) IV Continuous <Continuous>  fentaNYL   Infusion. 0.501 MICROgram(s)/kG/Hr (4.54 mL/Hr) IV Continuous <Continuous>  gabapentin 400 milliGRAM(s) Oral three times a day  heparin   Injectable 5000 Unit(s) SubCutaneous every 8 hours  ipratropium 17 MICROgram(s) HFA Inhaler 4 Puff(s) Inhalation every 6 hours  lactulose Syrup 20 Gram(s) Oral every 6 hours  lamoTRIgine 100 milliGRAM(s) Oral two times a day  methylPREDNISolone sodium succinate Injectable 40 milliGRAM(s) IV Push daily  midazolam Infusion 0.02 mG/kG/Hr (1.81 mL/Hr) IV Continuous <Continuous>  montelukast 10 milliGRAM(s) Oral daily  norepinephrine Infusion 0.05 MICROgram(s)/kG/Min (4.25 mL/Hr) IV Continuous <Continuous>  pantoprazole   Suspension 40 milliGRAM(s) Oral daily  polyethylene glycol 3350 17 Gram(s) Oral every 12 hours  QUEtiapine 250 milliGRAM(s) Oral at bedtime  senna 2 Tablet(s) Oral at bedtime    MEDICATIONS  (PRN):  fentaNYL    Injectable 25 MICROGram(s) IV Push every 6 hours PRN Severe Pain (7 - 10)      DVT PROPHYLAXIS: heparin   Injectable 5000 Unit(s) SubCutaneous every 8 hours    GI PROPHYLAXIS: pantoprazole   Suspension 40 milliGRAM(s) Oral daily    ANTICOAGULATION:   ANTIBIOTICS:  cefTRIAXone   IVPB 2000 milliGRAM(s)            LAB/STUDIES:  Labs:  CAPILLARY BLOOD GLUCOSE                              9.4    7.36  )-----------( 194      ( 23 Dec 2022 04:20 )             30.6       Auto Neutrophil %: 77.5 % (12-23-22 @ 04:20)  Auto Immature Granulocyte %: 0.5 % (12-23-22 @ 04:20)    12-23    142  |  112<H>  |  24<H>  ----------------------------<  137<H>  4.6   |  27  |  0.8      Calcium, Total Serum: 8.1 mg/dL (12-23-22 @ 04:20)      LFTs:             6.1  | 0.2  | 395      ------------------[263     ( 23 Dec 2022 04:20 )  2.9  | x    | 611         Lipase:x      Amylase:x         Blood Gas Arterial, Lactate: 2.00 mmol/L (12-23-22 @ 14:13)  Blood Gas Arterial, Lactate: 1.10 mmol/L (12-23-22 @ 03:12)  Blood Gas Arterial, Lactate: 1.30 mmol/L (12-22-22 @ 14:29)  Blood Gas Arterial, Lactate: 1.20 mmol/L (12-22-22 @ 08:24)  Blood Gas Arterial, Lactate: 1.00 mmol/L (12-22-22 @ 04:05)  Blood Gas Arterial, Lactate: 1.10 mmol/L (12-21-22 @ 03:26)    ABG - ( 23 Dec 2022 14:13 )  pH: 7.43  /  pCO2: 38    /  pO2: 104   / HCO3: 25    / Base Excess: 0.9   /  SaO2: 99.2      ABG - ( 23 Dec 2022 03:12 )  pH: 7.33  /  pCO2: 47    /  pO2: 133   / HCO3: 25    / Base Excess: -1.5  /  SaO2: 98.8      ABG - ( 22 Dec 2022 14:29 )  pH: 7.35  /  pCO2: 44    /  pO2: 84    / HCO3: 24    / Base Excess: -1.5  /  SaO2: 99.1        Coags:        Serum Pro-Brain Natriuretic Peptide: 592 pg/mL (12-15-22 @ 11:30)      IMAGING:  No new imaging    ACCESS/ DEVICES:  [x] Peripheral IV  [x] Chest tube: [x] Right chest tube and pigtail

## 2022-12-24 NOTE — PROGRESS NOTE ADULT - NS ATTEND AMEND GEN_ALL_CORE FT
CT surgery attending note    Covering for Dr. Yehuda Lowe    Patient seen and examined on morning rounds along with the residents    No acute events overnight  We have been following the right chest tube which appears to be in good position    Imaging studies reviewed  Chest tube continues to drain well    Suggest    Keep chest tube to suction  Management as per ICU team  Care and plan discussed with the patient and with the ICU staff in detail

## 2022-12-25 NOTE — PROGRESS NOTE ADULT - SUBJECTIVE AND OBJECTIVE BOX
SHAUN COATES  77y  Female      Patient is a 77y old  Female who presents with a chief complaint of Right chest pain (23 Dec 2022 09:26)      INTERVAL HPI/OVERNIGHT EVENTS:  No overnight events     REVIEW OF SYSTEMS:  Unobtainable     T(C): 37.2 (12-25-22 @ 12:00), Max: 37.2 (12-25-22 @ 12:00)  HR: 60 (12-25-22 @ 15:00) (60 - 140)  BP: 103/49 (12-25-22 @ 15:00) (98/42 - 175/116)  RR: 20 (12-25-22 @ 15:00) (20 - 43)  SpO2: 100% (12-25-22 @ 15:00) (96% - 100%)  Wt(kg): --Vital Signs Last 24 Hrs  T(C): 37.2 (25 Dec 2022 12:00), Max: 37.2 (25 Dec 2022 12:00)  T(F): 99 (25 Dec 2022 12:00), Max: 99 (25 Dec 2022 12:00)  HR: 60 (25 Dec 2022 15:00) (60 - 140)  BP: 103/49 (25 Dec 2022 15:00) (98/42 - 175/116)  BP(mean): 68 (25 Dec 2022 15:00) (61 - 147)  RR: 20 (25 Dec 2022 15:00) (20 - 43)  SpO2: 100% (25 Dec 2022 15:00) (96% - 100%)    Parameters below as of 25 Dec 2022 15:00  Patient On (Oxygen Delivery Method): ventilator    O2 Concentration (%): 40    PHYSICAL EXAM:  GENERAL: NAD, well-groomed, well-developed  NECK: Supple, No JVD, Normal thyroid  NERVOUS SYSTEM:  Obeying commands but drowzy and ? anxious  CHEST/LUNG: Clear to percussion bilaterally; No rales, rhonchi, wheezing, or rubs  HEART: Regular rate and rhythm; No murmurs, rubs, or gallops  ABDOMEN: Soft, Nontender, Nondistended; Bowel sounds present  EXTREMITIES:  2+ Peripheral Pulses, No clubbing, cyanosis, or edema  SKIN: No rashes or lesions    Consultant(s) Notes Reviewed:  [x ] YES  [ ] NO  Care Discussed with Consultants/Other Providers [ x] YES  [ ] NO      Labs reviewed   Imaging Reviewed     albuterol    90 MICROgram(s) HFA Inhaler 4 Puff(s) Inhalation every 6 hours  aspirin  chewable 81 milliGRAM(s) Oral daily  atorvastatin 20 milliGRAM(s) Oral at bedtime  atropine Injectable 0.6 milliGRAM(s) IV Push once  cefTRIAXone   IVPB 2000 milliGRAM(s) IV Intermittent every 24 hours  chlorhexidine 0.12% Liquid 15 milliLiter(s) Oral Mucosa two times a day  chlorhexidine 2% Cloths 1 Application(s) Topical <User Schedule>  clonazePAM  Tablet 0.5 milliGRAM(s) Oral daily  dexMEDEtomidine Infusion 0.2 MICROgram(s)/kG/Hr IV Continuous <Continuous>  enoxaparin Injectable 90 milliGRAM(s) SubCutaneous every 12 hours  fentaNYL    Injectable 25 MICROGram(s) IV Push every 6 hours PRN  fentaNYL   Infusion. 0.501 MICROgram(s)/kG/Hr IV Continuous <Continuous>  gabapentin 400 milliGRAM(s) Oral three times a day  ipratropium 17 MICROgram(s) HFA Inhaler 4 Puff(s) Inhalation every 6 hours  lamoTRIgine 100 milliGRAM(s) Oral two times a day  methylPREDNISolone sodium succinate Injectable 40 milliGRAM(s) IV Push daily  montelukast 10 milliGRAM(s) Oral daily  pantoprazole   Suspension 40 milliGRAM(s) Oral daily  polyethylene glycol 3350 17 Gram(s) Oral every 12 hours  QUEtiapine 250 milliGRAM(s) Oral at bedtime  senna 2 Tablet(s) Oral at bedtime      ASSESSMENT AND PLAN:      77F PMH COPD on 3-4 L nasal cannula at home, A. fib on Eliquis, pneumothorax x2, h/o pleurodesis on the right 15 years ago, CHF, BP1, Depression, HTN, FLAVIO on CPAP, emphysema, PVD, h/o COVID requiring intubation, carotid angioplasty, cholecystectomy, TIA x3, here for evaluation of atraumatic right upper back pain and shortness of breath that began this morning upon awakening around 6 AM.  Patient denies fever, chills, worsening cough, chest pain. Patient was transferred to [facility] from [facility] with complicated PTX.    #Acute hypoxemic respiratory failure   #Right pneumothorax S/P pig tail and surgical chest tube.  Persistent air leak and non expanding lung   #COPD exacerbation; H/O Chronic hypoxemic respiratory failure on home O2   - HOB @ 45 degrees.   - Pt was intubated due to worsening breathlessness and increased work of breathing  - Monitor PPL, DP, and auto PEEP.    - c/w Solumedrol 40 mg Q24.   - c/w Nebs Q4 - albuterol and ipratropium   - DW CTS and large bore Chest tube placed (12/19) superior to the previous pigtail location    - CT chest results noted  - rpt CXR -  lung expanding   - endobronchial valve placed   - DIC negative; HIT - awaited   - restarted lovenox    - SAT is difficult - pt gets very anxious; tried precedex - which she's not tolerating - bradycardic episodes.  - Stat lasix to maintain negative balance    #Septic Shock / obstructive shock   - Low BP - requiring levophed and phenyl  - Cultures taken and pt continued on Ceftriaxone - till 12/26/2022  - Procal, cultures awaited - negative to today   - Femoral CVC changed to IJV(12/21/2022)  - Rt Art. line (12/19) - removed - not functional     #Afib with RVR - reverted to sinus after amiodarone bolus - NST now   #Bradycardia    - pt developed RVR with a HR upto 200/min  - 5 mg lopressor stat given - HR controlled to 140's   - started amiodarone - pt reverted back to sinus rhythm with HR  - Pt developed bradycardia and an episode of asystole - non sustained   - Phenylephrine and amiodarone were on hold and levophed continued  - EP - suggest medical management now- outpatient follow up after stable   - Lovenox AC started    - LL doppler negative     #? seizure episode   - c/w clonazepam - possible withdrawal (home ativan dose 0.5 thrice daily)  - eeg done - generalized slow    HTN  - BB - on hold   - low sodium diet   - monitor vitals     #Hx//o of multiple TIA  # h/o of chronic persistent afib  # HLD  - hold metoprolol and Eliquis and statin  - TTE (10/21/21): EF 55-60%, mod pHTN, no valvular or wall motion abn  - tele monitoring     #COPD with chronic respiratory failure   #FLAVIO  - continue nebs Q6H - albuterol and ipratropium  - methypred 40mg Q24H     #Depression/ anxiety   - Seroquel qhs/ hold apple  - clonazepam 0.5mg QD   - lamictal     #OA  - tylenol prn pain 1-3      VTE -  heparin ppx ; consider full AC (for a.bernie)   Gi ppx - protonix  Diet- OG feeds   activity - bedrest   DW Family at length   FULL CODE

## 2022-12-25 NOTE — PROGRESS NOTE ADULT - ASSESSMENT
IMPRESSION:    Acute hypoxemic respiratory failure   Right pneumothorax SP pig tail.  Persistent air leak and non expanding lung SP endobronchial valves   Septic Shock / obstructive shock resolved   Rapid Afib now NSR   COPD exacerbation   HO Chronic hypoxemic respiratory failure on home O2     PLAN:    CNS:  SAT/precedex low dose 0.2-0.5, hold fenatanyl , pushes 25mcg if needed.  Continue  Klonopin     HEENT: Oral care.  ET care     PULMONARY:  HOB @ 45 degrees.  Vent changes:  PEEP 5.  Wean O2.  Monitor PPL, DP, and auto PEEP.  Solumedrol 40 mg Q24.  Nebs Q4 and PRN.  Chest tubes to water seal    SBT    CARDIOVASCULAR:  Goal directed fluid resuscitation.        GI: GI prophylaxis.  OG Feeding once stabilized.  Bowel regimen     RENAL:  Follow up lytes.  Correct as needed    INFECTIOUS DISEASE: Follow up cultures.  Continue ABX for now.     HEMATOLOGICAL:  DVT prophylaxis.  switch to LMWH      ENDOCRINE:  Follow up FS.  Insulin protocol if needed.     MUSCULOSKELETAL:  Bed rest     DC A line stat.  keep centra line  Freeman     Overall prognosis poor

## 2022-12-25 NOTE — CHART NOTE - NSCHARTNOTEFT_GEN_A_CORE
Registered Dietitian Follow-Up     Patient Profile Reviewed                           Yes [x]   No []     Nutrition History Previously Obtained        Yes [x]  No []       Pertinent Medical Interventions:   Per critical care attending:   Acute hypoxemic respiratory failure   Right pneumothorax SP pig tail.  Persistent air leak and non expanding lung SP endobronchial valves   Septic Shock / obstructive shock resolved   Rapid Afib now NSR   COPD exacerbation     Diet order:   Diet, NPO with Tube Feed:   Tube Feeding Modality: Orogastric  Jevity 1.2 Wilver  Total Volume for 24 Hours (mL): 1320  Continuous  Starting Tube Feed Rate {mL per Hour}: 10  Increase Tube Feed Rate by (mL): 10     Every 4 hours  Until Goal Tube Feed Rate (mL per Hour): 55  Tube Feed Duration (in Hours): 24  Tube Feed Start Time: 18:00  Free Water Flush  Bolus   Total Volume per Flush (mL): 200   Frequency: Every 6 Hours     Anthropometrics:  - Ht. 157.5 cm   - Wt. 90.7 kg   - BMI 36.6  - IBW 50 kg      Pertinent Lab Data:  12/25: BUN: 29, glucose: 105, alk phos: 188, AST/ALT: 62/252, PHOS: 1.8     Pertinent Meds:  MEDICATIONS  (STANDING):  atorvastatin 20 milliGRAM(s) Oral at bedtime  atropine Injectable 0.6 milliGRAM(s) IV Push once  lamoTRIgine 100 milliGRAM(s) Oral two times a day  pantoprazole   Suspension 40 milliGRAM(s) Oral daily  polyethylene glycol 3350 17 Gram(s) Oral every 12 hours  senna 2 Tablet(s) Oral at bedtime    Physical Findings:  - Appearance: unable to speak; endotracheal tube  - GI function: fecal incontinence, Last bowel movement 12/25  - Tubes: n/a  - Oral/Mouth cavity: no chewing/swallowing difficulty at this time   - Skin: ecchymosis; sacral discoloration, generalized edema +2, right hand; left hand edema +3      Nutrition Requirements  needs from initial dietitian assessment     Weight used for calculations	dosing weight  Estimated Energy Needs Weight (lbs)	199.9 lb  Estimated Energy Needs Weight (kg)	90.7 kg  Estimated Energy Needs From (wilver/kg) 11  Estimated Energy Needs To (wilver/kg)	14  Estimated Energy Needs Calculated From (wilver/kg) 997  Estimated Energy Needs Calculated To (wilver/kg)	1269      Estimated Protein Needs Weight (lbs)	110 lb  Estimated Protein Needs Weight (kg)	49.8 kg  Estimated Protein Needs To (g/kg)	2  Estimated Protein Needs Calculated To (g/kg)	99.6    Estimated Fluid Needs Weight (lbs)	199.9 lb  Estimated Fluid Needs Weight (kg)	90.7 kg  Estimated Fluid Needs From (ml/kg)	20  Estimated Fluid Needs To (ml/kg)	25  Estimated Fluid Needs Calculated From (ml/kg)	1814  Estimated Fluid Needs Calculated To (ml/kg)	2267       Nutrient Intake: Patient from current tube feeding regimen consuming provides 1584kcal, 72 g protein, 1069ml free water. over exceeding caloric needs and under meeting estimated protein needs      [] Previous Nutrition Diagnosis: inadequate rate of infusion             [x] Ongoing          [] Resolved     Nutrition Intervention EN      Goal/Expected Outcome: Patient to meet % of estimated energy and protein needs in the next 4 days     Indicator/Monitoring: RD to monitor: diet order, body composition, energy intake, nutrition focused physical finding: high risk due in 4 days     Recommendations:     Recommend high-protein hypocaloric feeds with formula Peptamen Intense VHP. Start at goal 45ml/hr (titration not needed given tolerance to current goal rate). This will provide 1080kcal, 99g protein, 896ml free water.

## 2022-12-25 NOTE — PROGRESS NOTE ADULT - SUBJECTIVE AND OBJECTIVE BOX
Over Night Events: remains critically ill  on fentanyl 1mcg       ROS:  See HPI    PHYSICAL EXAM    ICU Vital Signs Last 24 Hrs  T(C): 37.2 (25 Dec 2022 12:00), Max: 37.2 (25 Dec 2022 12:00)  T(F): 99 (25 Dec 2022 12:00), Max: 99 (25 Dec 2022 12:00)  HR: 100 (25 Dec 2022 11:00) (62 - 140)  BP: 175/116 (25 Dec 2022 10:30) (100/47 - 175/116)  BP(mean): 147 (25 Dec 2022 10:30) (63 - 147)  RR: 28 (25 Dec 2022 11:00) (24 - 43)  SpO2: 100% (25 Dec 2022 11:00) (96% - 100%)    O2 Parameters below as of 25 Dec 2022 12:00  Patient On (Oxygen Delivery Method): ventilator    O2 Concentration (%): 40        General: sedated  HEENT: MARY JANE             Lungs: Bilateral BS  Cardiovascular: Regular   Abdomen: Soft, Positive BS  Extremities: No clubbing   Skin: Warm  Neurological: intermittently  following commands, weak      12-24-22 @ 07:01  -  12-25-22 @ 07:00  --------------------------------------------------------  IN:    Enteral Tube Flush: 120 mL    FentaNYL: 153.2 mL    Free Water: 600 mL    IV PiggyBack: 50 mL    Jevity 1.2: 1320 mL  Total IN: 2243.2 mL    OUT:    Chest Tube (mL): 0 mL    Chest Tube (mL): 80 mL    Dexmedetomidine: 0 mL    Indwelling Catheter - Urethral (mL): 1190 mL    Midazolam: 0 mL    Norepinephrine: 0 mL  Total OUT: 1270 mL    Total NET: 973.2 mL      12-25-22 @ 07:01  -  12-25-22 @ 12:18  --------------------------------------------------------  IN:    FentaNYL: 89.6 mL    Free Water: 200 mL    Jevity 1.2: 275 mL  Total IN: 564.6 mL    OUT:    Indwelling Catheter - Urethral (mL): 310 mL  Total OUT: 310 mL    Total NET: 254.6 mL          LABS:                          9.0    10.46 )-----------( 242      ( 25 Dec 2022 05:55 )             29.8                                               12-25    140  |  106  |  29<H>  ----------------------------<  105<H>  4.8   |  29  |  0.7    Ca    7.6<L>      25 Dec 2022 05:55  Phos  1.8     12-25  Mg     2.4     12-25    TPro  5.2<L>  /  Alb  2.8<L>  /  TBili  0.2  /  DBili  x   /  AST  62<H>  /  ALT  252<H>  /  AlkPhos  188<H>  12-25                                                                                           LIVER FUNCTIONS - ( 25 Dec 2022 05:55 )  Alb: 2.8 g/dL / Pro: 5.2 g/dL / ALK PHOS: 188 U/L / ALT: 252 U/L / AST: 62 U/L / GGT: x                                                                                               Mode: AC/ CMV (Assist Control/ Continuous Mandatory Ventilation)  RR (machine): 28  TV (machine): 350  FiO2: 40  PEEP: 5  ITime: 1  MAP: 12  PIP: 31                                      ABG - ( 25 Dec 2022 04:08 )  pH, Arterial: 7.50  pH, Blood: x     /  pCO2: 35    /  pO2: 121   / HCO3: 27    / Base Excess: 4.0   /  SaO2: 99.5                MEDICATIONS  (STANDING):  albuterol    90 MICROgram(s) HFA Inhaler 4 Puff(s) Inhalation every 6 hours  aspirin  chewable 81 milliGRAM(s) Oral daily  atorvastatin 20 milliGRAM(s) Oral at bedtime  atropine Injectable 0.6 milliGRAM(s) IV Push once  cefTRIAXone   IVPB 2000 milliGRAM(s) IV Intermittent every 24 hours  chlorhexidine 0.12% Liquid 15 milliLiter(s) Oral Mucosa two times a day  chlorhexidine 2% Cloths 1 Application(s) Topical <User Schedule>  clonazePAM  Tablet 0.5 milliGRAM(s) Oral daily  fentaNYL   Infusion. 0.501 MICROgram(s)/kG/Hr (4.54 mL/Hr) IV Continuous <Continuous>  gabapentin 400 milliGRAM(s) Oral three times a day  heparin   Injectable 5000 Unit(s) SubCutaneous every 8 hours  ipratropium 17 MICROgram(s) HFA Inhaler 4 Puff(s) Inhalation every 6 hours  lamoTRIgine 100 milliGRAM(s) Oral two times a day  methylPREDNISolone sodium succinate Injectable 40 milliGRAM(s) IV Push daily  montelukast 10 milliGRAM(s) Oral daily  pantoprazole   Suspension 40 milliGRAM(s) Oral daily  polyethylene glycol 3350 17 Gram(s) Oral every 12 hours  QUEtiapine 250 milliGRAM(s) Oral at bedtime  senna 2 Tablet(s) Oral at bedtime    MEDICATIONS  (PRN):  fentaNYL    Injectable 25 MICROGram(s) IV Push every 6 hours PRN Severe Pain (7 - 10)      Xrays:                                                                                     ECHO     Over Night Events: remains critically ill  on fentanyl 1mcg       ROS:  See HPI    PHYSICAL EXAM    ICU Vital Signs Last 24 Hrs  T(C): 37.2 (25 Dec 2022 12:00), Max: 37.2 (25 Dec 2022 12:00)  T(F): 99 (25 Dec 2022 12:00), Max: 99 (25 Dec 2022 12:00)  HR: 100 (25 Dec 2022 11:00) (62 - 140)  BP: 175/116 (25 Dec 2022 10:30) (100/47 - 175/116)  BP(mean): 147 (25 Dec 2022 10:30) (63 - 147)  RR: 28 (25 Dec 2022 11:00) (24 - 43)  SpO2: 100% (25 Dec 2022 11:00) (96% - 100%)    O2 Parameters below as of 25 Dec 2022 12:00  Patient On (Oxygen Delivery Method): ventilator    O2 Concentration (%): 40        General: sedated  HEENT: MARY JANE             Lungs: Bilateral BS  Cardiovascular: Regular   Abdomen: Soft, Positive BS  Extremities: No clubbing   Skin: Warm  Neurological: intermittently  following commands, weak      12-24-22 @ 07:01  -  12-25-22 @ 07:00  --------------------------------------------------------  IN:    Enteral Tube Flush: 120 mL    FentaNYL: 153.2 mL    Free Water: 600 mL    IV PiggyBack: 50 mL    Jevity 1.2: 1320 mL  Total IN: 2243.2 mL    OUT:    Chest Tube (mL): 0 mL    Chest Tube (mL): 80 mL    Dexmedetomidine: 0 mL    Indwelling Catheter - Urethral (mL): 1190 mL    Midazolam: 0 mL    Norepinephrine: 0 mL  Total OUT: 1270 mL    Total NET: 973.2 mL      12-25-22 @ 07:01  -  12-25-22 @ 12:18  --------------------------------------------------------  IN:    FentaNYL: 89.6 mL    Free Water: 200 mL    Jevity 1.2: 275 mL  Total IN: 564.6 mL    OUT:    Indwelling Catheter - Urethral (mL): 310 mL  Total OUT: 310 mL    Total NET: 254.6 mL          LABS:                          9.0    10.46 )-----------( 242      ( 25 Dec 2022 05:55 )             29.8                                               12-25    140  |  106  |  29<H>  ----------------------------<  105<H>  4.8   |  29  |  0.7    Ca    7.6<L>      25 Dec 2022 05:55  Phos  1.8     12-25  Mg     2.4     12-25    TPro  5.2<L>  /  Alb  2.8<L>  /  TBili  0.2  /  DBili  x   /  AST  62<H>  /  ALT  252<H>  /  AlkPhos  188<H>  12-25                                                                                           LIVER FUNCTIONS - ( 25 Dec 2022 05:55 )  Alb: 2.8 g/dL / Pro: 5.2 g/dL / ALK PHOS: 188 U/L / ALT: 252 U/L / AST: 62 U/L / GGT: x                                                                                               Mode: AC/ CMV (Assist Control/ Continuous Mandatory Ventilation)  RR (machine): 28  TV (machine): 350  FiO2: 40  PEEP: 5  ITime: 1  MAP: 12  PIP: 31                                      ABG - ( 25 Dec 2022 04:08 )  pH, Arterial: 7.50  pH, Blood: x     /  pCO2: 35    /  pO2: 121   / HCO3: 27    / Base Excess: 4.0   /  SaO2: 99.5                MEDICATIONS  (STANDING):  albuterol    90 MICROgram(s) HFA Inhaler 4 Puff(s) Inhalation every 6 hours  aspirin  chewable 81 milliGRAM(s) Oral daily  atorvastatin 20 milliGRAM(s) Oral at bedtime  atropine Injectable 0.6 milliGRAM(s) IV Push once  cefTRIAXone   IVPB 2000 milliGRAM(s) IV Intermittent every 24 hours  chlorhexidine 0.12% Liquid 15 milliLiter(s) Oral Mucosa two times a day  chlorhexidine 2% Cloths 1 Application(s) Topical <User Schedule>  clonazePAM  Tablet 0.5 milliGRAM(s) Oral daily  fentaNYL   Infusion. 0.501 MICROgram(s)/kG/Hr (4.54 mL/Hr) IV Continuous <Continuous>  gabapentin 400 milliGRAM(s) Oral three times a day  heparin   Injectable 5000 Unit(s) SubCutaneous every 8 hours  ipratropium 17 MICROgram(s) HFA Inhaler 4 Puff(s) Inhalation every 6 hours  lamoTRIgine 100 milliGRAM(s) Oral two times a day  methylPREDNISolone sodium succinate Injectable 40 milliGRAM(s) IV Push daily  montelukast 10 milliGRAM(s) Oral daily  pantoprazole   Suspension 40 milliGRAM(s) Oral daily  polyethylene glycol 3350 17 Gram(s) Oral every 12 hours  QUEtiapine 250 milliGRAM(s) Oral at bedtime  senna 2 Tablet(s) Oral at bedtime    MEDICATIONS  (PRN):  fentaNYL    Injectable 25 MICROGram(s) IV Push every 6 hours PRN Severe Pain (7 - 10)      Xrays:       right apical pneumo, b/l opcatiies, central line right side ok, ET ok og ok                                                                              ECHO

## 2022-12-26 NOTE — PROGRESS NOTE ADULT - SUBJECTIVE AND OBJECTIVE BOX
Progress Note: Surgery  Patient: SHAUN COATES , 77y (1945)Female   MRN: [id number]  Location: [facility] [address]  Visit: 12-15-22 Inpatient  Date: 12-26-22 @ 09:27    Events over 24h: No acute event overnight. Patient remains intubated, pigtail this AM on CXR displaced. On physical exam, pigtail dislodged as well with (+) airleak.    Vitals: T(F): 99.1 (12-26-22 @ 08:00), Max: 99.5 (12-25-22 @ 22:00)  HR: 88 (12-26-22 @ 09:15)  BP: 113/50 (12-26-22 @ 09:15) (98/42 - 175/116)  RR: 29 (12-26-22 @ 09:15)  SpO2: 99% (12-26-22 @ 09:15)  RR (machine): 20, TV (machine): 350, FiO2: 40, PEEP: 5    Diet: Diet, NPO with Tube Feed:   Tube Feeding Modality: Orogastric  Jevity 1.2 Wilver  Total Volume for 24 Hours (mL): 1320  Continuous  Starting Tube Feed Rate mL per Hour: 10  Increase Tube Feed Rate by (mL): 10     Every 4 hours  Until Goal Tube Feed Rate (mL per Hour): 55  Tube Feed Duration (in Hours): 24  Tube Feed Start Time: 18:00  Free Water Flush  Bolus   Total Volume per Flush (mL): 200   Frequency: Every 6 Hours (12-22-22 @ 11:25)    In:   12-25-22 @ 07:01  -  12-26-22 @ 07:00  --------------------------------------------------------  IN: 2714.4 mL    12-26-22 @ 07:01  -  12-26-22 @ 09:27  --------------------------------------------------------  IN: 69.5 mL    Out:   12-25-22 @ 07:01  -  12-26-22 @ 07:00  --------------------------------------------------------  OUT:    Chest Tube (mL): 20 mL    Chest Tube (mL): 35 mL    Indwelling Catheter - Urethral (mL): 2890 mL    Oral Fluid: 0 mL  Total OUT: 2945 mL    12-26-22 @ 07:01  -  12-26-22 @ 09:27  --------------------------------------------------------  OUT:    Chest Tube (mL): 0 mL    Chest Tube (mL): 10 mL    FentaNYL: 0 mL    Indwelling Catheter - Urethral (mL): 150 mL  Total OUT: 160 mL    Net:   12-25-22 @ 07:01  -  12-26-22 @ 07:00  --------------------------------------------------------  NET: -230.6 mL    12-26-22 @ 07:01  -  12-26-22 @ 09:27  --------------------------------------------------------  NET: -90.5 mL    Physical Examination:  General Appearance: intubated and sedated  HEENT: NCAT, WNL  Heart: S1 and S2. No murmurs. Rhythm is regular.  Lungs: Clear to auscultation BL without rales, rhonchi, wheezing, crackles or diminished breath sounds.  Abdomen: Soft, nondistended, nontender.     Medications: [Standing]  albuterol    90 MICROgram(s) HFA Inhaler 4 Puff(s) Inhalation every 6 hours  aspirin  chewable 81 milliGRAM(s) Oral daily  atorvastatin 20 milliGRAM(s) Oral at bedtime  atropine Injectable 0.6 milliGRAM(s) IV Push once  chlorhexidine 0.12% Liquid 15 milliLiter(s) Oral Mucosa two times a day  chlorhexidine 2% Cloths 1 Application(s) Topical <User Schedule>  clonazePAM  Tablet 0.5 milliGRAM(s) Oral daily  dexMEDEtomidine Infusion 0.2 MICROgram(s)/kG/Hr (4.54 mL/Hr) IV Continuous <Continuous>  enoxaparin Injectable 90 milliGRAM(s) SubCutaneous every 12 hours  fentaNYL   Infusion. 0.501 MICROgram(s)/kG/Hr (4.54 mL/Hr) IV Continuous <Continuous>  gabapentin 400 milliGRAM(s) Oral three times a day  ipratropium 17 MICROgram(s) HFA Inhaler 4 Puff(s) Inhalation every 6 hours  lamoTRIgine 100 milliGRAM(s) Oral two times a day  methylPREDNISolone sodium succinate Injectable 40 milliGRAM(s) IV Push daily  montelukast 10 milliGRAM(s) Oral daily  norepinephrine Infusion 0.05 MICROgram(s)/kG/Min (8.5 mL/Hr) IV Continuous <Continuous>  pantoprazole   Suspension 40 milliGRAM(s) Oral daily  polyethylene glycol 3350 17 Gram(s) Oral every 12 hours  QUEtiapine 250 milliGRAM(s) Oral at bedtime  senna 2 Tablet(s) Oral at bedtime    Medications:[PRN]  fentaNYL    Injectable 25 MICROGram(s) IV Push every 6 hours PRN    Labs:                      9.0    11.35 )-----------( 253      ( 26 Dec 2022 05:29 )             28.7   12-26    141  |  104  |  30<H>  ----------------------------<  120<H>  4.9   |  34<H>  |  0.8    Ca    8.1<L>      26 Dec 2022 05:29  Phos  2.3     12-26  Mg     2.1     12-26    TPro  5.0<L>  /  Alb  2.8<L>  /  TBili  0.3  /  DBili  x   /  AST  48<H>  /  ALT  179<H>  /  AlkPhos  170<H>  12-26  LIVER FUNCTIONS - ( 26 Dec 2022 05:29 )  Alb: 2.8 g/dL / Pro: 5.0 g/dL / ALK PHOS: 170 U/L / ALT: 179 U/L / AST: 48 U/L / GGT: x         ABG - ( 26 Dec 2022 04:09 )  pH: 7.45  /  pCO2: 44    /  pO2: 117   / HCO3: 31    / Base Excess: 5.8   /  SaO2: 98.9      Micro/Urine:    Imaging:  < from: Xray Chest 1 View- PORTABLE-Routine (Xray Chest 1 View- PORTABLE-Routine in AM.) (12.25.22 @ 05:56) >  Impression:    Bilateral opacities, unchanged.    Right apical pneumothorax, slightly decreased.    < end of copied text >

## 2022-12-26 NOTE — PROGRESS NOTE ADULT - SUBJECTIVE AND OBJECTIVE BOX
SHAUN COATES  77y  Female      Patient is a 77y old  Female who presents with a chief complaint of Right chest pain (23 Dec 2022 09:26)      INTERVAL HPI/OVERNIGHT EVENTS:  No overnight events    REVIEW OF SYSTEMS:  Unobtainable    T(C): 37.6 (12-26-22 @ 12:00), Max: 37.6 (12-26-22 @ 12:00)  HR: 78 (12-26-22 @ 14:45) (58 - 124)  BP: 110/48 (12-26-22 @ 14:45) (97/46 - 174/59)  RR: 22 (12-26-22 @ 14:45) (19 - 37)  SpO2: 100% (12-26-22 @ 14:45) (96% - 100%)  Wt(kg): --Vital Signs Last 24 Hrs  T(C): 37.6 (26 Dec 2022 12:00), Max: 37.6 (26 Dec 2022 12:00)  T(F): 99.7 (26 Dec 2022 12:00), Max: 99.7 (26 Dec 2022 12:00)  HR: 78 (26 Dec 2022 14:45) (58 - 124)  BP: 110/48 (26 Dec 2022 14:45) (97/46 - 174/59)  BP(mean): 72 (26 Dec 2022 14:45) (64 - 122)  RR: 22 (26 Dec 2022 14:45) (19 - 37)  SpO2: 100% (26 Dec 2022 14:45) (96% - 100%)    Parameters below as of 26 Dec 2022 15:00  Patient On (Oxygen Delivery Method): ventilator    O2 Concentration (%): 40    PHYSICAL EXAM:  GENERAL: NAD, well-groomed, well-developed  NECK: Supple, No JVD, Normal thyroid  NERVOUS SYSTEM:  Alert & Arousable  CHEST/LUNG: Clear to percussion bilaterally; No rales, rhonchi, wheezing, or rubs  HEART: Regular rate and rhythm; No murmurs, rubs, or gallops  ABDOMEN: Soft, Nontender, Nondistended; Bowel sounds present  EXTREMITIES:  2+ Peripheral Pulses, No clubbing, cyanosis, edema +  SKIN: No rashes or lesions    Consultant(s) Notes Reviewed:  [x ] YES  [ ] NO  Care Discussed with Consultants/Other Providers [ x] YES  [ ] NO    Labs reviewed   Imaging Reviewed     albuterol    90 MICROgram(s) HFA Inhaler 4 Puff(s) Inhalation every 6 hours  aspirin  chewable 81 milliGRAM(s) Oral daily  atorvastatin 20 milliGRAM(s) Oral at bedtime  atropine Injectable 0.6 milliGRAM(s) IV Push once  chlorhexidine 0.12% Liquid 15 milliLiter(s) Oral Mucosa two times a day  chlorhexidine 2% Cloths 1 Application(s) Topical <User Schedule>  clonazePAM  Tablet 0.5 milliGRAM(s) Oral daily  dexMEDEtomidine Infusion 0.2 MICROgram(s)/kG/Hr IV Continuous <Continuous>  enoxaparin Injectable 90 milliGRAM(s) SubCutaneous every 12 hours  fentaNYL    Injectable 25 MICROGram(s) IV Push every 6 hours PRN  fentaNYL   Infusion. 0.501 MICROgram(s)/kG/Hr IV Continuous <Continuous>  gabapentin 400 milliGRAM(s) Oral three times a day  ipratropium 17 MICROgram(s) HFA Inhaler 4 Puff(s) Inhalation every 6 hours  lamoTRIgine 100 milliGRAM(s) Oral two times a day  methylPREDNISolone sodium succinate Injectable 40 milliGRAM(s) IV Push daily  montelukast 10 milliGRAM(s) Oral daily  norepinephrine Infusion 0.05 MICROgram(s)/kG/Min IV Continuous <Continuous>  pantoprazole   Suspension 40 milliGRAM(s) Oral daily  polyethylene glycol 3350 17 Gram(s) Oral every 12 hours  propofol Infusion 10 MICROgram(s)/kG/Min IV Continuous <Continuous>  QUEtiapine 250 milliGRAM(s) Oral at bedtime  senna 2 Tablet(s) Oral at bedtime      ASSESSMENT AND PLAN:    77F PMH COPD on 3-4 L nasal cannula at home, A. fib on Eliquis, pneumothorax x2, h/o pleurodesis on the right 15 years ago, CHF, BP1, Depression, HTN, FLAVIO on CPAP, emphysema, PVD, h/o COVID requiring intubation, carotid angioplasty, cholecystectomy, TIA x3, here for evaluation of atraumatic right upper back pain and shortness of breath that began this morning upon awakening around 6 AM.  Patient denies fever, chills, worsening cough, chest pain. Patient was transferred to [facility] from [facility] with complicated Ptx.    #Acute hypoxemic respiratory failure  #Right pneumothorax S/P pig tail and surgical chest tube.  Persistent air leak and non expanding lung  #COPD exacerbation; H/O Chronic hypoxemic respiratory failure on home O2  - HOB @ 45 degrees.   - Pt was intubated due to worsening breathlessness and increased work of breathing  - Monitor PPL, DP, and auto PEEP.    - c/w Solumedrol 40 mg Q24.   - c/w Nebs Q4 - albuterol and ipratropium   - DW CTS and large bore Chest tube placed (12/19) superior to the previous pigtail location    - CT chest results noted  - rpt CXR -  lung expanding   - endobronchial valve placed   - DIC negative; HIT - negative   - restarted lovenox    - SAT is difficult - pt gets very anxious; trying propofol today   - Pigtail displaced and is removed (12/26/2022)    #Septic Shock / obstructive shock   - Low BP - requiring levophed and phenyl  - Cultures taken and pt continued on Ceftriaxone - till 12/26/2022  - Procal, cultures awaited - negative to today   - Femoral CVC changed to IJV(12/21/2022)  - Rt Art. line (12/19) - removed (12/23)    #Afib with RVR - reverted to sinus after amiodarone bolus - NST now   #Bradycardia    - pt developed RVR with a HR upto 200/min  - 5 mg lopressor stat given - HR controlled to 140's   - started amiodarone - pt reverted back to sinus rhythm with HR  - Pt developed bradycardia and an episode of asystole - non sustained   - Phenylephrine and amiodarone were on hold and levophed continued  - EP - suggest medical management now- outpatient follow up after stable   - Lovenox AC started    - LL doppler negative     #? seizure episode   - c/w clonazepam - possible withdrawal (home ativan dose 0.5 thrice daily)  - eeg done - generalized slow    HTN  - BB - on hold   - low sodium diet   - monitor vitals     #Hx//o of multiple TIA  # h/o of chronic persistent afib  # HLD  - hold metoprolol and Eliquis and statin  - TTE (10/21/21): EF 55-60%, mod pHTN, no valvular or wall motion abn  - tele monitoring     #COPD with chronic respiratory failure   #FLAVIO  - continue nebs Q6H - albuterol and ipratropium  - methypred 40mg Q24H     #Depression/ anxiety   - Seroquel qhs/ hold apple  - clonazepam 0.5mg QD   - lamictal     #OA  - tylenol prn pain 1-3      VTE -  heparin ppx ; consider full AC (for a.fib)   Gi ppx - protonix  Diet- OG feeds   activity - bedrest   DW Family at length   FULL CODE

## 2022-12-26 NOTE — PROGRESS NOTE ADULT - SUBJECTIVE AND OBJECTIVE BOX
Patient is a 77y old  Female who presents with a chief complaint of Right chest pain (23 Dec 2022 09:26)        Over Night Events:    pigtail chest tube likely out, plan to remove  CXR stable since EBV placed      ROS:     Unable to assess ROS: patient intubated.        PHYSICAL EXAM    ICU Vital Signs Last 24 Hrs  T(C): 37.3 (26 Dec 2022 08:00), Max: 37.5 (25 Dec 2022 22:00)  T(F): 99.1 (26 Dec 2022 08:00), Max: 99.5 (25 Dec 2022 22:00)  HR: 64 (26 Dec 2022 08:00) (58 - 140)  BP: 104/43 (26 Dec 2022 08:00) (98/42 - 175/116)  BP(mean): 65 (26 Dec 2022 08:00) (61 - 147)  ABP: --  ABP(mean): --  RR: 21 (26 Dec 2022 08:00) (19 - 43)  SpO2: 99% (26 Dec 2022 08:00) (96% - 100%)    O2 Parameters below as of 26 Dec 2022 08:00  Patient On (Oxygen Delivery Method): ventilator    O2 Concentration (%): 40    Constitutional: no acute distress, well nourished well developed  Neuro: moving all 4 limbs spontaneously.  Sedated.  HEENT: NCAT, anicteric, ETT in place  Neck: no visible lymphadenopathy or goiter  Pulm: synchronous with ventilator, coarse bilateral breath sounds anteriorly.  2x right-sided chest tubes in place  Cardiac: extremities appear pink and well-perfused.  regular rhythm and rate, no murmur detected  Abdomen: non-distended  Extremities: trace dependent edema  Skin: no visible rashes or lesions      12-25-22 @ 07:01  -  12-26-22 @ 07:00  --------------------------------------------------------  IN:    Dexmedetomidine: 75 mL    Enteral Tube Flush: 360 mL    FentaNYL: 159.4 mL    Free Water: 800 mL    Jevity 1.2: 1320 mL  Total IN: 2714.4 mL    OUT:    Chest Tube (mL): 20 mL    Chest Tube (mL): 35 mL    Indwelling Catheter - Urethral (mL): 2890 mL    Oral Fluid: 0 mL  Total OUT: 2945 mL    Total NET: -230.6 mL      12-26-22 @ 07:01  -  12-26-22 @ 08:45  --------------------------------------------------------  IN:    Dexmedetomidine: 4.4 mL    Jevity 1.2: 55 mL  Total IN: 59.4 mL    OUT:    Chest Tube (mL): 0 mL    Chest Tube (mL): 10 mL    Indwelling Catheter - Urethral (mL): 90 mL  Total OUT: 100 mL    Total NET: -40.6 mL          LABS:                            9.0    11.35 )-----------( 253      ( 26 Dec 2022 05:29 )             28.7                                               12-26    141  |  104  |  30<H>  ----------------------------<  120<H>  4.9   |  34<H>  |  0.8    Ca    8.1<L>      26 Dec 2022 05:29  Phos  2.3     12-26  Mg     2.1     12-26    TPro  5.0<L>  /  Alb  2.8<L>  /  TBili  0.3  /  DBili  x   /  AST  48<H>  /  ALT  179<H>  /  AlkPhos  170<H>  12-26                                                                                           LIVER FUNCTIONS - ( 26 Dec 2022 05:29 )  Alb: 2.8 g/dL / Pro: 5.0 g/dL / ALK PHOS: 170 U/L / ALT: 179 U/L / AST: 48 U/L / GGT: x                                                                                               Mode: AC/ CMV (Assist Control/ Continuous Mandatory Ventilation)  RR (machine): 20  TV (machine): 350  FiO2: 40  PEEP: 5  ITime: 1  MAP: 10  PIP: 25                                      ABG - ( 26 Dec 2022 04:09 )  pH, Arterial: 7.45  pH, Blood: x     /  pCO2: 44    /  pO2: 117   / HCO3: 31    / Base Excess: 5.8   /  SaO2: 98.9                MEDICATIONS  (STANDING):  albuterol    90 MICROgram(s) HFA Inhaler 4 Puff(s) Inhalation every 6 hours  aspirin  chewable 81 milliGRAM(s) Oral daily  atorvastatin 20 milliGRAM(s) Oral at bedtime  atropine Injectable 0.6 milliGRAM(s) IV Push once  chlorhexidine 0.12% Liquid 15 milliLiter(s) Oral Mucosa two times a day  chlorhexidine 2% Cloths 1 Application(s) Topical <User Schedule>  clonazePAM  Tablet 0.5 milliGRAM(s) Oral daily  dexMEDEtomidine Infusion 0.2 MICROgram(s)/kG/Hr (4.54 mL/Hr) IV Continuous <Continuous>  enoxaparin Injectable 90 milliGRAM(s) SubCutaneous every 12 hours  fentaNYL   Infusion. 0.501 MICROgram(s)/kG/Hr (4.54 mL/Hr) IV Continuous <Continuous>  gabapentin 400 milliGRAM(s) Oral three times a day  ipratropium 17 MICROgram(s) HFA Inhaler 4 Puff(s) Inhalation every 6 hours  lamoTRIgine 100 milliGRAM(s) Oral two times a day  methylPREDNISolone sodium succinate Injectable 40 milliGRAM(s) IV Push daily  montelukast 10 milliGRAM(s) Oral daily  pantoprazole   Suspension 40 milliGRAM(s) Oral daily  polyethylene glycol 3350 17 Gram(s) Oral every 12 hours  QUEtiapine 250 milliGRAM(s) Oral at bedtime  senna 2 Tablet(s) Oral at bedtime    MEDICATIONS  (PRN):  fentaNYL    Injectable 25 MICROGram(s) IV Push every 6 hours PRN Severe Pain (7 - 10)        CXR interpreted by me:    12/26 images and reads reviewed, by my read showing migrated pigtail chest tube and second tube in place.

## 2022-12-26 NOTE — PROGRESS NOTE ADULT - ASSESSMENT
77y F w/ PMHx of O2 dependent COPD, A fib on Eliquis, Hx R pleurodesis d/t PTX in the past, CHF, BP1, depression, FLAVIO on CPAP, PVD, carotid angioplasty, cholecystectomy, TIA x 3, and Hx COVID requiring Intubation. She was admitted on 12/15/22 with R chest pain/SOB, and found to have right loculated PTX.     PLAN:  - Care per primary team  - d/c pigtail  - Recommend continuing chest tube to suction until 12/27  - Monitor chest tube output and air leaks  - Daily AM CXR  - Monitor vitals and labs  - DVT ppx, GI ppx  - Bowel regimen    Spectra [id number]

## 2022-12-26 NOTE — PROGRESS NOTE ADULT - ASSESSMENT
IMPRESSION:    Acute hypoxemic respiratory failure   Right pneumothorax SP pig tail.  Persistent air leak and non expanding lung SP endobronchial valves   Septic Shock / obstructive shock resolved   Rapid Afib now NSR   COPD exacerbation   HO Chronic hypoxemic respiratory failure on home O2     PLAN:    CNS:  SAT/precedex low dose 0.2-0.5, hold fenatanyl , pushes 25mcg if needed.  Continue  Klonopin     HEENT: Oral care.  ET care     PULMONARY:  HOB @ 45 degrees.  Vent changes:  PEEP 5.  Wean O2.  Monitor PPL, DP, and auto PEEP.  Solumedrol 40 mg Q24.  Nebs Q4 and PRN.  Chest tubes to water seal.  Lower chest tube to be removed, dislodged.  SBT while on dexamethasone    CARDIOVASCULAR:  Goal directed fluid resuscitation.     GI: GI prophylaxis.  OG Feeding once stabilized.  Bowel regimen     RENAL:  Follow up lytes.  Correct as needed    INFECTIOUS DISEASE: Follow up cultures.  Continue ABX for now.     HEMATOLOGICAL:  DVT prophylaxis.  switch to LMWH      ENDOCRINE:  Follow up FS.  Insulin protocol if needed.     MUSCULOSKELETAL:  Bed rest     DC A line stat.  d/c centra line if able to get PIV  Freeman out when extubated    Overall prognosis poor

## 2022-12-27 NOTE — PROGRESS NOTE ADULT - SUBJECTIVE AND OBJECTIVE BOX
SHAUN COATES  77y  Female      Patient is a 77y old  Female who presents with a chief complaint of sob (27 Dec 2022 08:52)      INTERVAL HPI/OVERNIGHT EVENTS:  No overnight events     REVIEW OF SYSTEMS:  Unobtainable     T(C): 37.4 (12-27-22 @ 08:00), Max: 37.6 (12-26-22 @ 12:00)  HR: 64 (12-27-22 @ 10:45) (58 - 104)  BP: 110/48 (12-27-22 @ 10:45) (96/45 - 199/64)  RR: 22 (12-27-22 @ 10:45) (19 - 42)  SpO2: 100% (12-27-22 @ 10:45) (93% - 100%)  Wt(kg): --Vital Signs Last 24 Hrs  T(C): 37.4 (27 Dec 2022 08:00), Max: 37.6 (26 Dec 2022 12:00)  T(F): 99.4 (27 Dec 2022 08:00), Max: 99.7 (26 Dec 2022 12:00)  HR: 64 (27 Dec 2022 10:45) (58 - 104)  BP: 110/48 (27 Dec 2022 10:45) (96/45 - 199/64)  BP(mean): 65 (27 Dec 2022 10:45) (59 - 99)  RR: 22 (27 Dec 2022 10:45) (19 - 42)  SpO2: 100% (27 Dec 2022 10:45) (93% - 100%)    Parameters below as of 27 Dec 2022 09:00  Patient On (Oxygen Delivery Method): BiPAP/CPAP    O2 Concentration (%): 40    PHYSICAL EXAM:  GENERAL: NAD, well-groomed, well-developed  NECK: Supple, No JVD, Normal thyroid  NERVOUS SYSTEM:  Alert & obeying commands   CHEST/LUNG: Clear to percussion bilaterally; No rales, rhonchi, wheezing, or rubs  HEART: Regular rate and rhythm; No murmurs, rubs, or gallops  ABDOMEN: Soft, Nontender, Nondistended; Bowel sounds present  EXTREMITIES:  2+ Peripheral Pulses, No clubbing, cyanosis, edema +  SKIN: No rashes or lesions    Consultant(s) Notes Reviewed:  [x ] YES  [ ] NO  Care Discussed with Consultants/Other Providers [ x] YES  [ ] NO    Labs reviewed   Imaging Reviewed     albuterol    90 MICROgram(s) HFA Inhaler 6 Puff(s) Inhalation every 6 hours PRN  albuterol/ipratropium for Nebulization 4 milliLiter(s) Nebulizer every 6 hours PRN  aspirin  chewable 81 milliGRAM(s) Oral daily  atorvastatin 20 milliGRAM(s) Oral at bedtime  atropine Injectable 0.6 milliGRAM(s) IV Push once  chlorhexidine 0.12% Liquid 15 milliLiter(s) Oral Mucosa two times a day  chlorhexidine 2% Cloths 1 Application(s) Topical <User Schedule>  clonazePAM  Tablet 0.5 milliGRAM(s) Oral daily  dexMEDEtomidine Infusion 0.2 MICROgram(s)/kG/Hr IV Continuous <Continuous>  enoxaparin Injectable 90 milliGRAM(s) SubCutaneous every 12 hours  gabapentin 400 milliGRAM(s) Oral three times a day  lamoTRIgine 100 milliGRAM(s) Oral two times a day  methylPREDNISolone sodium succinate Injectable 40 milliGRAM(s) IV Push daily  montelukast 10 milliGRAM(s) Oral daily  norepinephrine Infusion 0.05 MICROgram(s)/kG/Min IV Continuous <Continuous>  pantoprazole   Suspension 40 milliGRAM(s) Oral daily  polyethylene glycol 3350 17 Gram(s) Oral every 12 hours  propofol Infusion 10 MICROgram(s)/kG/Min IV Continuous <Continuous>  QUEtiapine 250 milliGRAM(s) Oral at bedtime  senna 2 Tablet(s) Oral at bedtime        ASSESSMENT AND PLAN:    77F PMH COPD on 3-4 L nasal cannula at home, A. fib on Eliquis, pneumothorax x2, h/o pleurodesis on the right 15 years ago, CHF, BP1, Depression, HTN, FLAVIO on CPAP, emphysema, PVD, h/o COVID requiring intubation, carotid angioplasty, cholecystectomy, TIA x3, here for evaluation of atraumatic right upper back pain and shortness of breath that began this morning upon awakening around 6 AM.  Patient denies fever, chills, worsening cough, chest pain. Patient was transferred to [facility] from [facility] with complicated Ptx.    #Acute hypoxemic respiratory failure  #Right pneumothorax S/P pig tail and surgical chest tube.  Persistent air leak and non expanding lung  #COPD exacerbation; H/O Chronic hypoxemic respiratory failure on home O2  - HOB @ 45 degrees.   - Pt was intubated due to worsening breathlessness and increased work of breathing  - Monitor PPL, DP, and auto PEEP.    - c/w Solumedrol 40 mg Q24.   - c/w Nebs Q4 - albuterol and ipratropium   - DW CTS and large bore Chest tube placed (12/19) superior to the previous pigtail location    - CT chest results noted  - rpt CXR -  lung expanding   - endobronchial valve placed   - DIC negative; HIT - negative   - restarted lovenox    - SAT is difficult - pt gets very anxious; trying propofol today   - Pigtail displaced and is removed (12/26/2022)    #Septic Shock / obstructive shock   - Low BP - requiring levophed and phenyl  - Cultures taken and completed Ceftriaxone   - Procal, cultures - negative to today   - Femoral CVC changed to IJV(12/21/2022)  - Rt Art. line (12/19) - removed (12/23)    #Afib with RVR - reverted to sinus after amiodarone bolus - NST now   #Bradycardia    - pt developed RVR with a HR upto 200/min  - 5 mg lopressor stat given - HR controlled to 140's   - started amiodarone - pt reverted back to sinus rhythm with HR  - Pt developed bradycardia and an episode of asystole - non sustained   - Phenylephrine and amiodarone were on hold and levophed continued  - EP - suggest medical management now- outpatient follow up after stable   - Lovenox AC started    - LL doppler negative     #? seizure episode   - c/w clonazepam - possible withdrawal (home ativan dose 0.5 thrice daily)  - eeg done - generalized slow    HTN  - BB - on hold   - low sodium diet   - monitor vitals     #Hx//o of multiple TIA  # h/o of chronic persistent afib  # HLD  - hold metoprolol and Eliquis and statin  - TTE (10/21/21): EF 55-60%, mod pHTN, no valvular or wall motion abn  - tele monitoring     #COPD with chronic respiratory failure   #FLAVIO  - continue nebs Q6H - albuterol and ipratropium  - methypred 40mg Q24H     #Depression/ anxiety   - Seroquel qhs/ hold apple  - clonazepam 0.5mg QD   - lamictal     #OA  - tylenol prn pain 1-3      VTE -  heparin ppx ; consider full AC (for a.fib)   Gi ppx - protonix  Diet- OG feeds   activity - bedrest   DW Family at length     After lengthy conversation, relatives opted for DNI/DNR following the extubation, if patient doesn't tolerate, they wish for CMO. Molst form signed, palliative onboard.          SHAUN COATES  77y  Female      Patient is a 77y old  Female who presents with a chief complaint of sob (27 Dec 2022 08:52)      INTERVAL HPI/OVERNIGHT EVENTS:  No overnight events     REVIEW OF SYSTEMS:  Unobtainable     T(C): 37.4 (12-27-22 @ 08:00), Max: 37.6 (12-26-22 @ 12:00)  HR: 64 (12-27-22 @ 10:45) (58 - 104)  BP: 110/48 (12-27-22 @ 10:45) (96/45 - 199/64)  RR: 22 (12-27-22 @ 10:45) (19 - 42)  SpO2: 100% (12-27-22 @ 10:45) (93% - 100%)  Wt(kg): --Vital Signs Last 24 Hrs  T(C): 37.4 (27 Dec 2022 08:00), Max: 37.6 (26 Dec 2022 12:00)  T(F): 99.4 (27 Dec 2022 08:00), Max: 99.7 (26 Dec 2022 12:00)  HR: 64 (27 Dec 2022 10:45) (58 - 104)  BP: 110/48 (27 Dec 2022 10:45) (96/45 - 199/64)  BP(mean): 65 (27 Dec 2022 10:45) (59 - 99)  RR: 22 (27 Dec 2022 10:45) (19 - 42)  SpO2: 100% (27 Dec 2022 10:45) (93% - 100%)    Parameters below as of 27 Dec 2022 09:00  Patient On (Oxygen Delivery Method): BiPAP/CPAP    O2 Concentration (%): 40    PHYSICAL EXAM:  GENERAL: NAD, well-groomed, well-developed  NECK: Supple, No JVD, Normal thyroid  NERVOUS SYSTEM:  Alert & obeying commands   CHEST/LUNG: Clear to percussion bilaterally; No rales, rhonchi, wheezing, or rubs  HEART: Regular rate and rhythm; No murmurs, rubs, or gallops  ABDOMEN: Soft, Nontender, Nondistended; Bowel sounds present  EXTREMITIES:  2+ Peripheral Pulses, No clubbing, cyanosis, edema +  SKIN: No rashes or lesions    Consultant(s) Notes Reviewed:  [x ] YES  [ ] NO  Care Discussed with Consultants/Other Providers [ x] YES  [ ] NO    Labs reviewed   Imaging Reviewed     albuterol    90 MICROgram(s) HFA Inhaler 6 Puff(s) Inhalation every 6 hours PRN  albuterol/ipratropium for Nebulization 4 milliLiter(s) Nebulizer every 6 hours PRN  aspirin  chewable 81 milliGRAM(s) Oral daily  atorvastatin 20 milliGRAM(s) Oral at bedtime  atropine Injectable 0.6 milliGRAM(s) IV Push once  chlorhexidine 0.12% Liquid 15 milliLiter(s) Oral Mucosa two times a day  chlorhexidine 2% Cloths 1 Application(s) Topical <User Schedule>  clonazePAM  Tablet 0.5 milliGRAM(s) Oral daily  dexMEDEtomidine Infusion 0.2 MICROgram(s)/kG/Hr IV Continuous <Continuous>  enoxaparin Injectable 90 milliGRAM(s) SubCutaneous every 12 hours  gabapentin 400 milliGRAM(s) Oral three times a day  lamoTRIgine 100 milliGRAM(s) Oral two times a day  methylPREDNISolone sodium succinate Injectable 40 milliGRAM(s) IV Push daily  montelukast 10 milliGRAM(s) Oral daily  norepinephrine Infusion 0.05 MICROgram(s)/kG/Min IV Continuous <Continuous>  pantoprazole   Suspension 40 milliGRAM(s) Oral daily  polyethylene glycol 3350 17 Gram(s) Oral every 12 hours  propofol Infusion 10 MICROgram(s)/kG/Min IV Continuous <Continuous>  QUEtiapine 250 milliGRAM(s) Oral at bedtime  senna 2 Tablet(s) Oral at bedtime        ASSESSMENT AND PLAN:    77F PMH COPD on 3-4 L nasal cannula at home, A. fib on Eliquis, pneumothorax x2, h/o pleurodesis on the right 15 years ago, CHF, BP1, Depression, HTN, FLAVIO on CPAP, emphysema, PVD, h/o COVID requiring intubation, carotid angioplasty, cholecystectomy, TIA x3, here for evaluation of atraumatic right upper back pain and shortness of breath that began this morning upon awakening around 6 AM.  Patient denies fever, chills, worsening cough, chest pain. Patient was transferred to [facility] from [facility] with complicated Ptx.    #Acute hypoxemic respiratory failure  #Right pneumothorax S/P pig tail and surgical chest tube.  Persistent air leak and non expanding lung  #COPD exacerbation; H/O Chronic hypoxemic respiratory failure on home O2  - HOB @ 45 degrees.   - Pt was intubated due to worsening breathlessness and increased work of breathing  - Monitor PPL, DP, and auto PEEP.    - c/w Solumedrol 40 mg Q24.   - c/w Nebs Q4 - albuterol and ipratropium   - endobronchial valve placed   - DIC negative; HIT - negative   - lovenox AC   - Pigtail displaced and is removed (12/26/2022)  - SAT/ SBT and extubated today  - pt holding well     #Septic Shock / obstructive shock   - off pressors   - Cultures taken and completed Ceftriaxone   - Femoral CVC changed to IJV(12/21/2022)  - Rt Art. line (12/19) - removed (12/23)    #Afib with RVR - reverted to sinus after amiodarone bolus - NST now   #Bradycardia    - pt developed RVR with a HR upto 200/min  - 5 mg lopressor stat given - HR controlled to 140's   - started amiodarone - pt reverted back to sinus rhythm with HR  - Pt developed bradycardia and an episode of asystole - non sustained   - Phenylephrine and amiodarone were on hold and levophed continued  - EP - suggest medical management now- outpatient follow up after stable   - Lovenox AC started    - LL doppler negative     #? seizure episode   - c/w clonazepam - possible withdrawal (home ativan dose 0.5 thrice daily)  - eeg done - generalized slow    HTN  - BB - on hold   - low sodium diet   - monitor vitals     #Hx//o of multiple TIA  # h/o of chronic persistent afib  # HLD  - hold metoprolol and Eliquis and statin  - TTE (10/21/21): EF 55-60%, mod pHTN, no valvular or wall motion abn  - tele monitoring     #COPD with chronic respiratory failure   #FLAVIO  - continue nebs Q6H - albuterol and ipratropium  - methypred 40mg Q24H     #Depression/ anxiety   - Seroquel qhs/ hold apple  - clonazepam 0.5mg QD   - lamictal     #OA  - tylenol prn pain 1-3      VTE -  heparin ppx ; consider full AC (for a.fib)   Gi ppx - protonix  Diet- OG feeds   activity - bedrest   DW Family at length     After lengthy conversation, relatives opted for DNI/DNR following the extubation, if patient doesn't tolerate, they wish for CMO. Molst form signed, palliative onboard.

## 2022-12-27 NOTE — PROVIDER CONTACT NOTE (OTHER) - ACTION/TREATMENT ORDERED:
Patient OOB to chair   Incentive spirometer done with encouragement
MD Aware of dark red BM. STAT labs ordered. Protonix IVP push ordered. GI consult placed. No further interventions per MD
OK to give morphine now.
Green team notified and at bedside assessing pt

## 2022-12-27 NOTE — CONSULT NOTE ADULT - ASSESSMENT
77F with PMH including COPD on 3-4 L NC, AF on eliquis, CHF, HTN, depression, FLAVIO on CPAP, emphysema, here with acute hypoxemic respiratory failure requiring intubation, with R pneumothorax requiring pigtail catheter, and septic shock, now resolved. Course further c/b rapid AF and COPD exacerbation. Palliative care consulted for GOC, in the setting of family desire for transition to comfort measures if patient is symptomatic after medical extubation, Patient is DNR/DNI.

## 2022-12-27 NOTE — PROGRESS NOTE ADULT - ASSESSMENT
IMPRESSION:    Acute hypoxemic respiratory failure still intubated on 40%  Right pneumothorax SP pig tail.  Persistent air leak and non expanding lung SP endobronchial valves   Septic Shock / obstructive shock resolved   Rapid Afib now NSR   COPD exacerbation   HO Chronic hypoxemic respiratory failure on home O2     PLAN:    CNS:  SAT/precedex low dose 0.2-0.5, Continue  Klonopin     HEENT: Oral care.  ET care     PULMONARY:  HOB @ 45 degrees.  Vent changes:  PEEP 5.  Wean O2.  Monitor PPL, DP, and auto PEEP.  Solumedrol 40 mg Q24.  Nebs Q4 and PRN.  Chest tubes to water seal.  Lower chest tube to be removed, dislodged.  SBT while on dexamethasone    CARDIOVASCULAR:  Goal directed fluid resuscitation.     GI: GI prophylaxis.  OG Feeding once stabilized.  Bowel regimen     RENAL:  Follow up lytes.  Correct as needed    INFECTIOUS DISEASE: sp abx    HEMATOLOGICAL: on lovenix    ENDOCRINE:  Follow up FS.  Insulin protocol if needed.     MUSCULOSKELETAL:  Bed rest     R IJ 12/21    Overall prognosis poor

## 2022-12-27 NOTE — PROGRESS NOTE ADULT - SUBJECTIVE AND OBJECTIVE BOX
Over Night Events: events noted, remain critically ill, still intubated, ventiated, on precedex, levophed 0.02    PHYSICAL EXAM    ICU Vital Signs Last 24 Hrs  T(C): 37.4 (27 Dec 2022 08:00), Max: 37.6 (26 Dec 2022 12:00)  T(F): 99.4 (27 Dec 2022 08:00), Max: 99.7 (26 Dec 2022 12:00)  HR: 94 (27 Dec 2022 08:30) (58 - 106)  BP: 124/56 (27 Dec 2022 08:30) (96/45 - 199/64)  BP(mean): 79 (27 Dec 2022 08:30) (59 - 99)  RR: 34 (27 Dec 2022 08:30) (19 - 38)  SpO2: 96% (27 Dec 2022 08:30) (95% - 100%)    O2 Parameters below as of 27 Dec 2022 08:00  Patient On (Oxygen Delivery Method): ventilator    O2 Concentration (%): 40        General: ill looking  HEENT: ETT          Lungs: Dec bs both bases  Cardiovascular: Regular   Abdomen: Soft, Positive BS  Extremities: No clubbing   Skin: Warm  Neurological: Non focal       12-26-22 @ 07:01  -  12-27-22 @ 07:00  --------------------------------------------------------  IN:    Dexmedetomidine: 38.7 mL    Enteral Tube Flush: 300 mL    Free Water: 400 mL    Jevity 1.2: 660 mL    Norepinephrine: 76 mL    Propofol: 182.8 mL  Total IN: 1657.5 mL    OUT:    Chest Tube (mL): 30 mL    Chest Tube (mL): 15 mL    FentaNYL: 0 mL    Indwelling Catheter - Urethral (mL): 1980 mL  Total OUT: 2025 mL    Total NET: -367.5 mL      12-27-22 @ 07:01  -  12-27-22 @ 08:53  --------------------------------------------------------  IN:    Dexmedetomidine: 21.8 mL    Jevity 1.2: 55 mL    Norepinephrine: 3.3 mL  Total IN: 80.1 mL    OUT:    Indwelling Catheter - Urethral (mL): 100 mL    Propofol: 0 mL  Total OUT: 100 mL    Total NET: -19.9 mL          LABS:                          9.1    14.70 )-----------( 298      ( 27 Dec 2022 05:34 )             29.8                                               12-27    138  |  103  |  30<H>  ----------------------------<  141<H>  4.9   |  32  |  0.7    Ca    8.4      27 Dec 2022 05:34  Phos  3.6     12-27  Mg     2.2     12-27    TPro  5.7<L>  /  Alb  3.0<L>  /  TBili  0.3  /  DBili  x   /  AST  53<H>  /  ALT  152<H>  /  AlkPhos  175<H>  12-27                                                                                           LIVER FUNCTIONS - ( 27 Dec 2022 05:34 )  Alb: 3.0 g/dL / Pro: 5.7 g/dL / ALK PHOS: 175 U/L / ALT: 152 U/L / AST: 53 U/L / GGT: x                                                                                               Mode: AC/ CMV (Assist Control/ Continuous Mandatory Ventilation)  RR (machine): 20  TV (machine): 350  FiO2: 40  PEEP: 5  ITime: 1  MAP: 9  PIP: 24                                      ABG - ( 27 Dec 2022 03:32 )  pH, Arterial: 7.47  pH, Blood: x     /  pCO2: 39    /  pO2: 164   / HCO3: 28    / Base Excess: 4.5   /  SaO2: 98.0      p/p 29/26          MEDICATIONS  (STANDING):  aspirin  chewable 81 milliGRAM(s) Oral daily  atorvastatin 20 milliGRAM(s) Oral at bedtime  atropine Injectable 0.6 milliGRAM(s) IV Push once  chlorhexidine 0.12% Liquid 15 milliLiter(s) Oral Mucosa two times a day  chlorhexidine 2% Cloths 1 Application(s) Topical <User Schedule>  clonazePAM  Tablet 0.5 milliGRAM(s) Oral daily  dexMEDEtomidine Infusion 0.2 MICROgram(s)/kG/Hr (4.54 mL/Hr) IV Continuous <Continuous>  enoxaparin Injectable 90 milliGRAM(s) SubCutaneous every 12 hours  gabapentin 400 milliGRAM(s) Oral three times a day  lamoTRIgine 100 milliGRAM(s) Oral two times a day  methylPREDNISolone sodium succinate Injectable 40 milliGRAM(s) IV Push daily  montelukast 10 milliGRAM(s) Oral daily  norepinephrine Infusion 0.05 MICROgram(s)/kG/Min (8.5 mL/Hr) IV Continuous <Continuous>  pantoprazole   Suspension 40 milliGRAM(s) Oral daily  polyethylene glycol 3350 17 Gram(s) Oral every 12 hours  propofol Infusion 10 MICROgram(s)/kG/Min (5.44 mL/Hr) IV Continuous <Continuous>  QUEtiapine 250 milliGRAM(s) Oral at bedtime  senna 2 Tablet(s) Oral at bedtime    MEDICATIONS  (PRN):  albuterol    90 MICROgram(s) HFA Inhaler 6 Puff(s) Inhalation every 6 hours PRN Shortness of Breath and/or Wheezing  albuterol/ipratropium for Nebulization 4 milliLiter(s) Nebulizer every 6 hours PRN Shortness of Breath and/or Wheezing    CXR reviewed

## 2022-12-27 NOTE — PROGRESS NOTE ADULT - SUBJECTIVE AND OBJECTIVE BOX
GENERAL SURGERY PROGRESS NOTE    Patient: SHAUN COATES , 77y ([birth year])Female   MRN: [id number]  Location: [facility]  A  Visit: 12-15-22 Inpatient  Date: 12-27-22 @ 13:07    Events of past 24 hours: Patient extubated on BIPAP at 40%    PAST MEDICAL & SURGICAL HISTORY:  Hypertension      Depression      COPD (chronic obstructive pulmonary disease)      Other cardiomyopathy      Other emphysema      PVD (peripheral vascular disease)      Bipolar 1 disorder      FLAVIO on CPAP      Transient ischemic attack  x 3      Congestive heart failure      Falls      2019 novel coronavirus disease (COVID-19)      Respiratory failure requiring intubation      Afib      HLD (hyperlipidemia)      History of cholecystectomy      H/O carotid angioplasty          Vitals:   T(F): 99.5 (12-27-22 @ 12:00), Max: 99.5 (12-26-22 @ 20:00)  HR: 66 (12-27-22 @ 12:45)  BP: 104/52 (12-27-22 @ 12:45)  RR: 19 (12-27-22 @ 12:45)  SpO2: 100% (12-27-22 @ 12:45)  Mode: standby    Diet, NPO with Tube Feed:   Tube Feeding Modality: Orogastric  Peptamen Intense VHP  Total Volume for 24 Hours (mL): 1080  Continuous  Starting Tube Feed Rate mL per Hour: 45  Until Goal Tube Feed Rate (mL per Hour): 45  Tube Feed Duration (in Hours): 24  Tube Feed Start Time: 08:00  Free Water Flush  Bolus   Total Volume per Flush (mL): 200   Frequency: Every 6 Hours      Fluids:     I & O's:    12-26-22 @ 07:01  -  12-27-22 @ 07:00  --------------------------------------------------------  IN:    Dexmedetomidine: 38.7 mL    Enteral Tube Flush: 300 mL    Free Water: 400 mL    Jevity 1.2: 660 mL    Norepinephrine: 76 mL    Propofol: 182.8 mL  Total IN: 1657.5 mL    OUT:    Chest Tube (mL): 30 mL    Chest Tube (mL): 15 mL    FentaNYL: 0 mL    Indwelling Catheter - Urethral (mL): 1980 mL  Total OUT: 2025 mL    Total NET: -367.5 mL    Physical Examination:  General Appearance: intubated and sedated  HEENT: NCAT, WNL  Heart: S1 and S2. No murmurs. Rhythm is regular.  Lungs: Clear to auscultation BL without rales, rhonchi, wheezing, crackles or diminished breath sounds.  Abdomen: Soft, nondistended, nontender.     MEDICATIONS  (STANDING):  aspirin  chewable 81 milliGRAM(s) Oral daily  atorvastatin 20 milliGRAM(s) Oral at bedtime  atropine Injectable 0.6 milliGRAM(s) IV Push once  chlorhexidine 0.12% Liquid 15 milliLiter(s) Oral Mucosa two times a day  chlorhexidine 2% Cloths 1 Application(s) Topical <User Schedule>  dexMEDEtomidine Infusion 0.2 MICROgram(s)/kG/Hr (4.54 mL/Hr) IV Continuous <Continuous>  enoxaparin Injectable 90 milliGRAM(s) SubCutaneous every 12 hours  gabapentin 400 milliGRAM(s) Oral three times a day  lamoTRIgine 100 milliGRAM(s) Oral two times a day  methylPREDNISolone sodium succinate Injectable 40 milliGRAM(s) IV Push daily  montelukast 10 milliGRAM(s) Oral daily  norepinephrine Infusion 0.05 MICROgram(s)/kG/Min (8.5 mL/Hr) IV Continuous <Continuous>  pantoprazole   Suspension 40 milliGRAM(s) Oral daily  polyethylene glycol 3350 17 Gram(s) Oral every 12 hours  propofol Infusion 10 MICROgram(s)/kG/Min (5.44 mL/Hr) IV Continuous <Continuous>  QUEtiapine 250 milliGRAM(s) Oral at bedtime  senna 2 Tablet(s) Oral at bedtime    MEDICATIONS  (PRN):  albuterol    90 MICROgram(s) HFA Inhaler 6 Puff(s) Inhalation every 6 hours PRN Shortness of Breath and/or Wheezing  albuterol/ipratropium for Nebulization 4 milliLiter(s) Nebulizer every 6 hours PRN Shortness of Breath and/or Wheezing      DVT PROPHYLAXIS: enoxaparin Injectable 90 milliGRAM(s) SubCutaneous every 12 hours    GI PROPHYLAXIS: pantoprazole   Suspension 40 milliGRAM(s) Oral daily    ANTICOAGULATION:   ANTIBIOTICS:            LAB/STUDIES:  Labs:  CAPILLARY BLOOD GLUCOSE      POCT Blood Glucose.: 104 mg/dL (26 Dec 2022 23:50)                          9.1    14.70 )-----------( 298      ( 27 Dec 2022 05:34 )             29.8       Auto Neutrophil %: 70.0 % (12-27-22 @ 05:34)  Auto Immature Granulocyte %: 1.0 % (12-27-22 @ 05:34)    12-27    138  |  103  |  30<H>  ----------------------------<  141<H>  4.9   |  32  |  0.7      Calcium, Total Serum: 8.4 mg/dL (12-27-22 @ 05:34)      LFTs:             5.7  | 0.3  | 53       ------------------[175     ( 27 Dec 2022 05:34 )  3.0  | x    | 152         Lipase:x      Amylase:x         Blood Gas Arterial, Lactate: 1.40 mmol/L (12-27-22 @ 10:19)  Blood Gas Arterial, Lactate: 1.20 mmol/L (12-27-22 @ 03:32)  Blood Gas Arterial, Lactate: 1.10 mmol/L (12-26-22 @ 11:09)  Blood Gas Arterial, Lactate: 0.90 mmol/L (12-26-22 @ 04:09)  Blood Gas Arterial, Lactate: 1.60 mmol/L (12-25-22 @ 16:15)  Blood Gas Arterial, Lactate: 1.20 mmol/L (12-25-22 @ 04:08)    ABG - ( 27 Dec 2022 10:19 )  pH: 7.46  /  pCO2: 42    /  pO2: 76    / HCO3: 30    / Base Excess: 5.4   /  SaO2: 97.2            ABG - ( 27 Dec 2022 03:32 )  pH: 7.47  /  pCO2: 39    /  pO2: 164   / HCO3: 28    / Base Excess: 4.5   /  SaO2: 98.0            ABG - ( 26 Dec 2022 11:09 )  pH: 7.49  /  pCO2: 41    /  pO2: 75    / HCO3: 31    / Base Excess: 7.2   /  SaO2: 96.8

## 2022-12-27 NOTE — CONSULT NOTE ADULT - SUBJECTIVE AND OBJECTIVE BOX
HPI: 77F with PMH including COPD on 3-4 L NC, AF on eliquis, CHF, HTN, depression, FLAVIO on CPAP, emphysema, here with acute hypoxemic respiratory failure requiring intubation, with R pneumothorax requiring pigtail catheter, and septic shock, now resolved. Course further c/b rapid AF and COPD exacerbation. Palliative care consulted for GOC, in the setting of family desire for transition to comfort measures if patient is symptomatic after medical extubation, Patient is DNR/DNI.    PERTINENT PM/SXH:   Hypertension    Depression    COPD (chronic obstructive pulmonary disease)    TIA (transient ischemic attack)    Other cardiomyopathy    Other emphysema    PVD (peripheral vascular disease)    Allergic reaction    Bipolar 1 disorder    FLAVIO on CPAP    Transient ischemic attack    Congestive heart failure    Falls    2019 novel coronavirus disease (COVID-19)    Respiratory failure requiring intubation    Afib    HLD (hyperlipidemia)      History of cholecystectomy    H/O carotid angioplasty      FAMILY HISTORY:  FH: heart disease (Father, Mother)      ITEMS NOT CHECKED ARE NOT PRESENT    SOCIAL HISTORY:   Significant other/partner[X ]  Children[ ]  Sikh/Spirituality:  Substance hx:  [ ]   Tobacco hx:  [ ]   Alcohol hx: [ ]   Living Situation: [X ]Home  [ ]Long term care  [ ]Rehab [ ]Other  Home Services: [ ] HHA [ ] Visting RN [ ] Hospice  Occupation:  Home Opioid hx:  [ ] Y [ X] N [X ] I-Stop Reference No: [id number] with lorazepam    ADVANCE DIRECTIVES:    [ ] Full Code [ ] DNR  MOLST  [ ]  Living Will  [ ]   DECISION MAKER(s):  [ ] Health Care Proxy(s)  [ ] Surrogate(s)  [ ] Guardian           Name(s): Phone Number(s): spouse Juno [phone]    BASELINE (I)ADL(s) (prior to admission):  [place]: [ ]Total  [ ] Moderate [ ]Dependent  Palliative Performance Status Version 2:         %    http://npcrc.org/files/news/palliative_performance_scale_ppsv2.pdf    Allergies    codeine (Other (Moderate))  Depakote (Unknown)  Dilaudid (Short breath; Rash)  IV Contrast (Anaphylaxis)  losartan (Angioedema)  Risperdal (Other)  verapamil (Short breath; Angioedema)    Intolerances    MEDICATIONS  (STANDING):  aspirin  chewable 81 milliGRAM(s) Oral daily  atorvastatin 20 milliGRAM(s) Oral at bedtime  atropine Injectable 0.6 milliGRAM(s) IV Push once  chlorhexidine 0.12% Liquid 15 milliLiter(s) Oral Mucosa two times a day  chlorhexidine 2% Cloths 1 Application(s) Topical <User Schedule>  dexMEDEtomidine Infusion 0.2 MICROgram(s)/kG/Hr (4.54 mL/Hr) IV Continuous <Continuous>  enoxaparin Injectable 90 milliGRAM(s) SubCutaneous every 12 hours  gabapentin 400 milliGRAM(s) Oral three times a day  lamoTRIgine 100 milliGRAM(s) Oral two times a day  methylPREDNISolone sodium succinate Injectable 40 milliGRAM(s) IV Push daily  montelukast 10 milliGRAM(s) Oral daily  norepinephrine Infusion 0.05 MICROgram(s)/kG/Min (8.5 mL/Hr) IV Continuous <Continuous>  pantoprazole   Suspension 40 milliGRAM(s) Oral daily  polyethylene glycol 3350 17 Gram(s) Oral every 12 hours  propofol Infusion 10 MICROgram(s)/kG/Min (5.44 mL/Hr) IV Continuous <Continuous>  QUEtiapine 250 milliGRAM(s) Oral at bedtime  senna 2 Tablet(s) Oral at bedtime    MEDICATIONS  (PRN):  albuterol    90 MICROgram(s) HFA Inhaler 6 Puff(s) Inhalation every 6 hours PRN Shortness of Breath and/or Wheezing  albuterol/ipratropium for Nebulization 4 milliLiter(s) Nebulizer every 6 hours PRN Shortness of Breath and/or Wheezing    PRESENT SYMPTOMS: [X ]Unable to obtain due to poor mentation   Source if other than patient:  [ ]Family   [ ]Team     Pain: [ ]yes [ ]no  QOL impact -   Location -                    Aggravating factors -  Quality -  Radiation -  Timing-  Severity (0-10 scale):  Minimal acceptable level (0-10 scale):     CPOT:    https://www.sccm.org/getattachment/kak78g71-7b9h-1n3g-4b0q-3324w3350m1s/Critical-Care-Pain-Observation-Tool-(CPOT)      PAIN AD Score:     http://geriatrictoolkit.[facility]/cog/painad.pdf (press ctrl +  left click to view)    Dyspnea:                           [ ]Mild [ ]Moderate [ ]Severe  Anxiety:                             [ ]Mild [ ]Moderate [ ]Severe  Fatigue:                             [ ]Mild [ ]Moderate [ ]Severe  Nausea:                             [ ]Mild [ ]Moderate [ ]Severe  Loss of appetite:              [ ]Mild [ ]Moderate [ ]Severe  Constipation:                    [ ]Mild [ ]Moderate [ ]Severe    Other Symptoms:  [ ]All other review of systems negative     Palliative Performance Status Version 2:         %    http://[facility].org/files/news/palliative_performance_scale_ppsv2.pdf  PHYSICAL EXAM:  Vital Signs Last 24 Hrs  T(C): 37.5 (27 Dec 2022 12:00), Max: 37.5 (26 Dec 2022 20:00)  T(F): 99.5 (27 Dec 2022 12:00), Max: 99.5 (26 Dec 2022 20:00)  HR: 88 (27 Dec 2022 17:00) (64 - 104)  BP: 134/65 (27 Dec 2022 17:00) (84/44 - 199/64)  BP(mean): 86 (27 Dec 2022 17:00) (59 - 101)  RR: 27 (27 Dec 2022 17:00) (19 - 42)  SpO2: 92% (27 Dec 2022 17:00) (91% - 100%)    Parameters below as of 27 Dec 2022 16:00  Patient On (Oxygen Delivery Method): nasal cannula  O2 Flow (L/min): 4   I&O's Summary    26 Dec 2022 07:01  -  27 Dec 2022 07:00  --------------------------------------------------------  IN: 1657.5 mL / OUT: 2025 mL / NET: -367.5 mL    27 Dec 2022 07:01  -  27 Dec 2022 17:48  --------------------------------------------------------  IN: 138.6 mL / OUT: 590 mL / NET: -451.4 mL        GENERAL:  [X ] No acute distress [ ]Lethargic  [ ]Unarousable  [ ]Verbal  [ ]Non-Verbal [ ]Cachexia    BEHAVIORAL/PSYCH:  [ ]Alert and Oriented x  [ ] Anxiety [ ] Delirium [ ] Agitation [ X] Calm   EYES: [ ] No scleral icterus [ ] Scleral icterus [X ] Closed  ENMT:  [ ]Dry mouth  [ ]No oral lesions [X ] No external ear or nose lesions  CARDIOVASCULAR:  [ ]Regular [ ]Irregular [ ]Tachy [ ]Not Tachy  [ ]Darwin [ ] Edema  PULMONARY:  [ ]Tachypnea  [ ]Audible excessive secretions [ X] No labored breathing [ ] labored breathing  GASTROINTESTINAL: [ ]Soft  [ ]Distended  [ X] Not distended [ ]Non tender [ ]Tender    MUSCULOSKELETAL: [ ]No clubbing [ ] clubbing  [X ] No cyanosis [ ] cyanosis  NEUROLOGIC: [ ]No focal deficits [ ] Follows commands [ ] Does not follow commands  [X ]Cognitive impairment  [ ]Dysphagia [ ]Dysarthria [ ]Paresis   GENITOURINARY: [ ]Normal [ ] Incontinent   [ ]Oliguria/Anuria   [ ]Freeman  SKIN: [ ] Jaundiced [ X] Non-jaundiced [ ]Rash [ ]No Rash [ ] Warm [ ] Dry  MISC/LINES: [ X] ET tube [ ] Trach [ ]NGT/OGT [ ]PEG [ ]Freeman    CRITICAL CARE:  [ ] Shock Present  [ ]Septic [ ]Cardiogenic [ ]Neurologic [ ]Hypovolemic  [ ]  Vasopressors [ ]  Inotropes   [ ]Respiratory failure present [ ]Mechanical ventilation [ ]Non-invasive ventilatory support [ ]High flow  [ ]Acute  [ ]Chronic [ ]Hypoxic  [ ]Hypercarbic [ ]Other  [ ]Other organ failure     LABS: reviewed                        9.1    14.70 )-----------( 298      ( 27 Dec 2022 05:34 )             29.8   12-27    138  |  103  |  30<H>  ----------------------------<  141<H>  4.9   |  32  |  0.7    Ca    8.4      27 Dec 2022 05:34  Phos  3.6     12-27  Mg     2.2     12-27    TPro  5.7<L>  /  Alb  3.0<L>  /  TBili  0.3  /  DBili  x   /  AST  53<H>  /  ALT  152<H>  /  AlkPhos  175<H>  12-27        RADIOLOGY & ADDITIONAL STUDIES:  CXR  Stable right-sided pneumothorax with adjacent fibrotic right upper lobe.  Stable elevation right hemidiaphragm.    PROTEIN CALORIE MALNUTRITION PRESENT: [ ]mild [ ]moderate [ ]severe [ ]underweight [ ]morbid obesity  https://www.andeal.org/vault/2440/web/files/ONC/Table_Clinical%20Characteristics%20to%20Document%20Malnutrition-White%20JV%20et%20al%474614.pdf    Height (cm): 157.5 (12-15-22 @ 10:40), 157.5 (10-18-22 @ 06:20), 157.5 (08-23-22 @ 13:09)  Weight (kg): 86.9 (12-27-22 @ 04:00), 81.6 (10-18-22 @ 06:20), 84.8 (08-23-22 @ 13:09)  BMI (kg/m2): 35 (12-27-22 @ 04:00), 32.9 (12-15-22 @ 10:40), 32.9 (10-18-22 @ 06:20)    [ ]PPSV2 < or = to 30% [ ]significant weight loss  [ ]poor nutritional intake  [ ]anasarca      [ ]Artificial Nutrition      REFERRALS:   [ ]Chaplaincy  [ ]Hospice  [ ]Child Life  [ ]Social Work  [ ]Case management [ ]Holistic Therapy     Goals of Care Document:     ______________  Jonas Young MD  Palliative Medicine  [facility]   of Geriatric and Palliative Medicine  [phone]

## 2022-12-27 NOTE — CONSULT NOTE ADULT - PROBLEM SELECTOR RECOMMENDATION 4
- d/w team  - will follow  ______________  Jonas Young MD  Palliative Medicine  [facility]   of Geriatric and Palliative Medicine  [phone]

## 2022-12-27 NOTE — PROVIDER CONTACT NOTE (OTHER) - SITUATION
large loose bloody BM
patient desating to 86-87% on high flow and complaining of increased thoracic pain.
Pt o2 sat 85% - with 15 L 40% on Venturi mask - R chest tube previously kinked
Right sided chest tube with fluctuation chamber, air leak

## 2022-12-27 NOTE — CONSULT NOTE ADULT - PROBLEM SELECTOR RECOMMENDATION 9
- patient evaluated initially by me prior to extubation  - post-extubation, appeared comfortable, family at bedside  - d/w team  - monitor symptoms, [phone] as needed, no opioids/benzos needed currently for symptom management

## 2022-12-27 NOTE — PROGRESS NOTE ADULT - ASSESSMENT
· Assessment	  77y F w/ PMHx of O2 dependent COPD, A fib on Eliquis, Hx R pleurodesis d/t PTX in the past, CHF, BP1, depression, FLAVIO on CPAP, PVD, carotid angioplasty, cholecystectomy, TIA x 3, and Hx COVID requiring Intubation. She was admitted on 12/15/22 with R chest pain/SOB, and found to have right loculated PTX.     PLAN:  - Care per primary team  - Chest tube to water seal for the next 24 hours, CXR to be shot exactly at 4:00PM 12/27  - Monitor chest tube output and air leaks  - Daily AM CXR  - Monitor vitals and labs  - DVT ppx, GI ppx  - Bowel regimen    Spectra [id number]

## 2022-12-27 NOTE — PROVIDER CONTACT NOTE (OTHER) - REASON
Pt o2 sat 85% - with 15 L 40% on Venturi mask - R chest tube
large loose bloody BM
desating and c/o increased lung pain
Chest tube

## 2022-12-28 NOTE — SWALLOW BEDSIDE ASSESSMENT ADULT - SLP GENERAL OBSERVATIONS
pt awake alert without c/o pain. pt on 4L o2 via NC. hoarse vocal quality. deep bronchial chest congestion

## 2022-12-28 NOTE — SWALLOW BEDSIDE ASSESSMENT ADULT - NS SPL SWALLOW CLINIC TRIAL FT
suspected pharyngeal dysphagia; + toleration observed without overt symptoms of penetration/aspiration for small controlled sips

## 2022-12-28 NOTE — PROGRESS NOTE ADULT - SUBJECTIVE AND OBJECTIVE BOX
SHAUN COATES  77y  Female      Patient is a 77y old  Female who presents with a chief complaint of sob (28 Dec 2022 08:42)      INTERVAL HPI/OVERNIGHT EVENTS:  Had 4 episodes of bloody bowel moments with hb drop. 1 Prbc transfused and vitals were stable. GI called and following.     REVIEW OF SYSTEMS:  CONSTITUTIONAL: No fever, weight loss, or fatigue  NECK: No pain or stiffness  RESPIRATORY: No cough, wheezing, chills or hemoptysis; No shortness of breath  CARDIOVASCULAR: No chest pain, palpitations, dizziness, or leg swelling  GASTROINTESTINAL: blood in stools, no abdominal pain.  GENITOURINARY: No dysuria, frequency, hematuria, or incontinence  NEUROLOGICAL: No headaches, memory loss, loss of strength, numbness, or tremors  SKIN: No itching, burning, rashes, or lesions   MUSCULOSKELETAL: No joint pain or swelling; No muscle, back, or extremity pain      T(C): 36.7 (12-28-22 @ 08:00), Max: 37.7 (12-27-22 @ 16:00)  HR: 108 (12-28-22 @ 10:00) (72 - 110)  BP: 141/60 (12-28-22 @ 10:00) (96/44 - 158/59)  RR: 24 (12-28-22 @ 10:00) (15 - 41)  SpO2: 95% (12-28-22 @ 10:00) (91% - 100%)  Wt(kg): --Vital Signs Last 24 Hrs  T(C): 36.7 (28 Dec 2022 08:00), Max: 37.7 (27 Dec 2022 16:00)  T(F): 98 (28 Dec 2022 08:00), Max: 99.9 (27 Dec 2022 16:00)  HR: 108 (28 Dec 2022 10:00) (72 - 110)  BP: 141/60 (28 Dec 2022 10:00) (96/44 - 158/59)  BP(mean): 82 (28 Dec 2022 10:00) (61 - 109)  RR: 24 (28 Dec 2022 10:00) (15 - 41)  SpO2: 95% (28 Dec 2022 10:00) (91% - 100%)    Parameters below as of 28 Dec 2022 06:00  Patient On (Oxygen Delivery Method): nasal cannula  O2 Flow (L/min): 4      PHYSICAL EXAM:  GENERAL: NAD, well-groomed, well-developed  NECK: Supple, No JVD, Normal thyroid  NERVOUS SYSTEM:  Alert & Oriented X3,   CHEST/LUNG: Clear to percussion bilaterally; No rales, rhonchi, wheezing, or rubs  HEART: Regular rate and rhythm; No murmurs, rubs, or gallops  ABDOMEN: Soft, Nontender, Nondistended; Bowel sounds present  EXTREMITIES:  2+ Peripheral Pulses, No clubbing, cyanosis, or edema  SKIN: No rashes or lesions    Consultant(s) Notes Reviewed:  [x ] YES  [ ] NO  Care Discussed with Consultants/Other Providers [ x] YES  [ ] NO    Labs reviewed   Imaging Reviewed     albuterol    90 MICROgram(s) HFA Inhaler 6 Puff(s) Inhalation every 6 hours PRN  albuterol/ipratropium for Nebulization 4 milliLiter(s) Nebulizer every 6 hours PRN  aspirin  chewable 81 milliGRAM(s) Oral daily  atorvastatin 20 milliGRAM(s) Oral at bedtime  atropine Injectable 0.6 milliGRAM(s) IV Push once  chlorhexidine 0.12% Liquid 15 milliLiter(s) Oral Mucosa two times a day  chlorhexidine 2% Cloths 1 Application(s) Topical <User Schedule>  gabapentin 400 milliGRAM(s) Oral three times a day  lactated ringers. 1000 milliLiter(s) IV Continuous <Continuous>  lamoTRIgine 100 milliGRAM(s) Oral two times a day  LORazepam     Tablet 0.5 milliGRAM(s) Oral two times a day  methylPREDNISolone sodium succinate Injectable 40 milliGRAM(s) IV Push daily  montelukast 10 milliGRAM(s) Oral daily  morphine  - Injectable 1 milliGRAM(s) IV Push every 6 hours  norepinephrine Infusion 0.05 MICROgram(s)/kG/Min IV Continuous <Continuous>  norepinephrine Infusion 0.05 MICROgram(s)/kG/Min IV Continuous <Continuous>  pantoprazole  Injectable 40 milliGRAM(s) IV Push every 12 hours  phenylephrine    Infusion 0.1 MICROgram(s)/kG/Min IV Continuous <Continuous>  polyethylene glycol 3350 17 Gram(s) Oral every 12 hours  QUEtiapine 250 milliGRAM(s) Oral at bedtime  senna 2 Tablet(s) Oral at bedtime        ASSESSMENT AND PLAN:    77F PMH COPD on 3-4 L nasal cannula at home, A. fib on Eliquis, pneumothorax x2, h/o pleurodesis on the right 15 years ago, CHF, BP1, Depression, HTN, FLAVIO on CPAP, emphysema, PVD, h/o COVID requiring intubation, carotid angioplasty, cholecystectomy, TIA x3, here for evaluation of atraumatic right upper back pain and shortness of breath that began this morning upon awakening around 6 AM.  Patient denies fever, chills, worsening cough, chest pain. Patient was transferred to [facility] from [facility] with complicated Ptx.    #Acute hypoxemic respiratory failure  #Right pneumothorax S/P pig tail and surgical chest tube.  Persistent air leak and non expanding lung  #COPD exacerbation; H/O Chronic hypoxemic respiratory failure on home O2  - HOB @ 45 degrees.   - Pt was intubated due to worsening breathlessness and increased work of breathing  - Monitor PPL, DP, and auto PEEP.    - c/w Solumedrol 40 mg Q24.   - c/w Nebs Q4 - albuterol and ipratropium   - endobronchial valve placed   - DIC negative; HIT - negative   - lovenox AC - on hold   - Pigtail displaced and is removed (12/26/2022)  - extubated yesterday  - Had an episode of hypotension and SOB today - CXR had persisitent pneumo  - CT sx willing to do a repeat chest tube     #Hematochezia   - Pt had 4 episodes of large bbm   - required 1 prbc transfusion for the hb drop   - vitals stable.  - GI on board - wait and watch for further episodes for now   - if needed - CTA vs colonoscopy    #Septic Shock / obstructive shock   - off pressors   - Cultures taken and completed Ceftriaxone   - Femoral CVC changed to IJV(12/21/2022)  - Rt Art. line (12/19) - removed (12/23)    #Afib with RVR - reverted to sinus after amiodarone bolus - NST now   #Bradycardia    - pt developed RVR with a HR upto 200/min  - 5 mg lopressor stat given - HR controlled to 140's   - started amiodarone - pt reverted back to sinus rhythm with HR  - Pt developed bradycardia and an episode of asystole - non sustained   - Phenylephrine and amiodarone were on hold and levophed continued  - EP - suggest medical management now- outpatient follow up after stable   - Lovenox AC started    - LL doppler negative     #? seizure episode   - c/w clonazepam - possible withdrawal (home ativan dose 0.5 thrice daily)  - eeg done - generalized slow    HTN  - BB - on hold   - low sodium diet   - monitor vitals     #Hx//o of multiple TIA  # h/o of chronic persistent afib  # HLD  - hold metoprolol and Eliquis and statin  - TTE (10/21/21): EF 55-60%, mod pHTN, no valvular or wall motion abn  - tele monitoring     #COPD with chronic respiratory failure   #FLAVIO  - continue nebs Q6H - albuterol and ipratropium  - methypred 40mg Q24H     #Depression/ anxiety   - Seroquel qhs/ hold apple  - clonazepam 0.5mg QD   - lamictal     #OA  - tylenol prn pain 1-3    VTE -  heparin ppx ; consider full AC (for a.fib)   Gi ppx - protonix  Diet- OG feeds   activity - bedrest   DW Family at length     After lengthy conversation, relatives opted for DNR. DNI is revoked.

## 2022-12-28 NOTE — PROGRESS NOTE ADULT - ASSESSMENT
IMPRESSION:    Acute hypoxemic respiratory failure sp extubation on NC  Right pneumothorax SP pig tail.  Persistent air leak and non expanding lung SP endobronchial valves   Septic Shock / obstructive shock resolved   Rapid Afib now NSR   COPD exacerbation   HO Chronic hypoxemic respiratory failure on home O2     PLAN:    CNS:  avoid CNS depressant    HEENT: Oral care.  ET care     PULMONARY:  HOB @ 45 degrees.  ct per sx, neb as needed  CARDIOVASCULAR:  dc IVF    GI: GI prophylaxis.  NPO, GI fup    RENAL:  Follow up lytes.  Correct as needed    INFECTIOUS DISEASE: sp abx    HEMATOLOGICAL: hold AC    ENDOCRINE:  Follow up FS.  Insulin protocol if needed.     MUSCULOSKELETAL:  Bed rest     R IJ 12/21    Overall prognosis poor

## 2022-12-28 NOTE — CHART NOTE - NSCHARTNOTEFT_GEN_A_CORE
Pt had sudden decompensation, drop in bp, increased hr and shortness of breath.    She was started on fluids, and pressors, Pt had sudden decompensation, drop in bp, increased hr and shortness of breath.    She was started on fluids, and pressors. Despite the resuscitation measures, pt decompensated further with vitals dipping to a severely critical level.    Family was bedside and goals of care were discussed. We explained about the need for intubation.     , son and daughter wanted to pursue comfort measures. Molst form was updated and comfort measures were started.

## 2022-12-28 NOTE — PROGRESS NOTE ADULT - SUBJECTIVE AND OBJECTIVE BOX
Over Night Events: events noted, sp extubation, NS 77 CC/ H, no pressors    PHYSICAL EXAM    ICU Vital Signs Last 24 Hrs  T(C): 36.7 (28 Dec 2022 08:00), Max: 37.7 (27 Dec 2022 16:00)  T(F): 98 (28 Dec 2022 08:00), Max: 99.9 (27 Dec 2022 16:00)  HR: 104 (28 Dec 2022 08:00) (64 - 110)  BP: 152/69 (28 Dec 2022 08:00) (84/44 - 156/53)  BP(mean): 105 (28 Dec 2022 08:00) (59 - 109)  RR: 22 (28 Dec 2022 08:00) (15 - 42)  SpO2: 93% (28 Dec 2022 08:00) (91% - 100%)    O2 Parameters below as of 28 Dec 2022 06:00  Patient On (Oxygen Delivery Method): nasal cannula  O2 Flow (L/min): 4          General: ill looking  Lungs: dec bs both bases  Cardiovascular: Regular   Abdomen: Soft, Positive BS  Extremities: No clubbing   Skin: Warm  Neurological: Non focal       12-27-22 @ 07:01  -  12-28-22 @ 07:00  --------------------------------------------------------  IN:    Dexmedetomidine: 53.8 mL    Jevity 1.2: 55 mL    Norepinephrine: 33 mL    PRBCs (Packed Red Blood Cells): 324 mL    Propofol: 5.2 mL    sodium chloride 0.9%: 600 mL  Total IN: 1071 mL    OUT:    Chest Tube (mL): 10 mL    Indwelling Catheter - Urethral (mL): 1360 mL  Total OUT: 1370 mL    Total NET: -299 mL      12-28-22 @ 07:01  -  12-28-22 @ 08:43  --------------------------------------------------------  IN:    sodium chloride 0.9%: 75 mL  Total IN: 75 mL    OUT:    Chest Tube (mL): 0 mL    Indwelling Catheter - Urethral (mL): 40 mL  Total OUT: 40 mL    Total NET: 35 mL          LABS:                          8.0    15.86 )-----------( 272      ( 28 Dec 2022 03:25 )             25.4                                               12-28    138  |  102  |  33<H>  ----------------------------<  82  4.6   |  27  |  0.9    Ca    8.8      28 Dec 2022 04:10  Phos  4.6     12-28  Mg     2.2     12-28    TPro  5.5<L>  /  Alb  3.1<L>  /  TBili  0.6  /  DBili  x   /  AST  41  /  ALT  119<H>  /  AlkPhos  153<H>  12-28      PT/INR - ( 27 Dec 2022 22:44 )   PT: 15.40 sec;   INR: 1.34 ratio         PTT - ( 27 Dec 2022 22:44 )  PTT:38.9 sec                                                                                     LIVER FUNCTIONS - ( 28 Dec 2022 04:10 )  Alb: 3.1 g/dL / Pro: 5.5 g/dL / ALK PHOS: 153 U/L / ALT: 119 U/L / AST: 41 U/L / GGT: x                                                                                               Mode: standby                                      ABG - ( 27 Dec 2022 14:19 )  pH, Arterial: 7.46  pH, Blood: x     /  pCO2: 42    /  pO2: 99    / HCO3: 30    / Base Excess: 5.4   /  SaO2: 99.4                MEDICATIONS  (STANDING):  aspirin  chewable 81 milliGRAM(s) Oral daily  atorvastatin 20 milliGRAM(s) Oral at bedtime  atropine Injectable 0.6 milliGRAM(s) IV Push once  chlorhexidine 0.12% Liquid 15 milliLiter(s) Oral Mucosa two times a day  chlorhexidine 2% Cloths 1 Application(s) Topical <User Schedule>  diphenhydrAMINE Injectable 50 milliGRAM(s) IV Push once  gabapentin 400 milliGRAM(s) Oral three times a day  lamoTRIgine 100 milliGRAM(s) Oral two times a day  methylPREDNISolone sodium succinate Injectable 40 milliGRAM(s) IV Push daily  methylPREDNISolone sodium succinate Injectable 40 milliGRAM(s) IV Push once  montelukast 10 milliGRAM(s) Oral daily  norepinephrine Infusion 0.05 MICROgram(s)/kG/Min (8.5 mL/Hr) IV Continuous <Continuous>  pantoprazole  Injectable 40 milliGRAM(s) IV Push every 12 hours  polyethylene glycol 3350 17 Gram(s) Oral every 12 hours  QUEtiapine 250 milliGRAM(s) Oral at bedtime  senna 2 Tablet(s) Oral at bedtime  sodium chloride 0.9%. 1000 milliLiter(s) (75 mL/Hr) IV Continuous <Continuous>    MEDICATIONS  (PRN):  albuterol    90 MICROgram(s) HFA Inhaler 6 Puff(s) Inhalation every 6 hours PRN Shortness of Breath and/or Wheezing  albuterol/ipratropium for Nebulization 4 milliLiter(s) Nebulizer every 6 hours PRN Shortness of Breath and/or Wheezing    CXR reviewed

## 2022-12-28 NOTE — SWALLOW BEDSIDE ASSESSMENT ADULT - SLP PERTINENT HISTORY OF CURRENT PROBLEM
77F with PMH including COPD on 3-4 L NC, AF on eliquis, CHF, HTN, depression, FLAVIO on CPAP, emphysema, here with acute hypoxemic respiratory failure requiring intubation, with R pneumothorax requiring pigtail catheter, and septic shock, now resolved. Course further c/b rapid AF and COPD exacerbation. Palliative is following patient

## 2022-12-28 NOTE — PROGRESS NOTE ADULT - SUBJECTIVE AND OBJECTIVE BOX
GENERAL SURGERY PROGRESS NOTE    Patient: SHAUN COATES , 77y ([birth year])Female   MRN: [id number]  Location: [facility]  A  Visit: 12-15-22 Inpatient  Date: 12-28-22 @ 02:19    Events of past 24 hours:  Chest tube placed to water seal. F/u CXR showed stable PTX.    Loose, bloody BM overnight. Primary team aware.    PAST MEDICAL & SURGICAL HISTORY:  Hypertension    Depression    COPD (chronic obstructive pulmonary disease)    Other cardiomyopathy    Other emphysema    PVD (peripheral vascular disease)    Bipolar 1 disorder    FLAVIO on CPAP    Transient ischemic attack  x 3    Congestive heart failure    Falls    2019 novel coronavirus disease (COVID-19)    Respiratory failure requiring intubation    Afib    HLD (hyperlipidemia)    History of cholecystectomy    H/O carotid angioplasty      Vitals:   T(F): 99.5 (12-28-22 @ 00:00), Max: 99.9 (12-27-22 @ 16:00)  HR: 100 (12-28-22 @ 00:30)  BP: 104/41 (12-28-22 @ 00:30)  RR: 20 (12-28-22 @ 00:30)  SpO2: 97% (12-28-22 @ 00:30)  Mode: standby    Diet, NPO:   Except Medications      Fluids: sodium chloride 0.9%.: Solution, 1000 milliLiter(s) infuse at 75 mL/Hr      I & O's:    12-26-22 @ 07:01  -  12-27-22 @ 07:00  --------------------------------------------------------  IN:    Dexmedetomidine: 38.7 mL    Enteral Tube Flush: 300 mL    Free Water: 400 mL    Jevity 1.2: 660 mL    Norepinephrine: 76 mL    Propofol: 182.8 mL  Total IN: 1657.5 mL    OUT:    Chest Tube (mL): 30 mL    Chest Tube (mL): 15 mL    FentaNYL: 0 mL    Indwelling Catheter - Urethral (mL): 1980 mL  Total OUT: 2025 mL    Total NET: -367.5 mL    PHYSICAL EXAM:  General: NAD, calm and cooperative.  Cardiac: RRR.  Respiratory: On BiPAP. No accessory muscles of respiration in use. Bilateral chest rise. R chest tube in place to water seal. No air leak.   Abdomen: Soft, non-distended, non-tender, no rebound, no guarding.   Skin: Warm/dry, normal color, no jaundice.      MEDICATIONS  (STANDING):  aspirin  chewable 81 milliGRAM(s) Oral daily  atorvastatin 20 milliGRAM(s) Oral at bedtime  atropine Injectable 0.6 milliGRAM(s) IV Push once  chlorhexidine 0.12% Liquid 15 milliLiter(s) Oral Mucosa two times a day  chlorhexidine 2% Cloths 1 Application(s) Topical <User Schedule>  gabapentin 400 milliGRAM(s) Oral three times a day  lamoTRIgine 100 milliGRAM(s) Oral two times a day  methylPREDNISolone sodium succinate Injectable 40 milliGRAM(s) IV Push daily  montelukast 10 milliGRAM(s) Oral daily  norepinephrine Infusion 0.05 MICROgram(s)/kG/Min (8.5 mL/Hr) IV Continuous <Continuous>  pantoprazole  Injectable 40 milliGRAM(s) IV Push every 12 hours  polyethylene glycol 3350 17 Gram(s) Oral every 12 hours  QUEtiapine 250 milliGRAM(s) Oral at bedtime  senna 2 Tablet(s) Oral at bedtime  sodium chloride 0.9%. 1000 milliLiter(s) (75 mL/Hr) IV Continuous <Continuous>    MEDICATIONS  (PRN):  albuterol    90 MICROgram(s) HFA Inhaler 6 Puff(s) Inhalation every 6 hours PRN Shortness of Breath and/or Wheezing  albuterol/ipratropium for Nebulization 4 milliLiter(s) Nebulizer every 6 hours PRN Shortness of Breath and/or Wheezing      DVT PROPHYLAXIS:   GI PROPHYLAXIS: pantoprazole  Injectable 40 milliGRAM(s) IV Push every 12 hours    ANTICOAGULATION:   ANTIBIOTICS:        LAB/STUDIES:  Labs:  CAPILLARY BLOOD GLUCOSE                          8.6    16.06 )-----------( 291      ( 27 Dec 2022 22:44 )             27.0       Auto Neutrophil %: 70.0 % (12-27-22 @ 05:34)  Auto Immature Granulocyte %: 1.0 % (12-27-22 @ 05:34)    12-27    138  |  103  |  30<H>  ----------------------------<  141<H>  4.9   |  32  |  0.7      Calcium, Total Serum: 8.4 mg/dL (12-27-22 @ 05:34)      LFTs:             5.7  | 0.3  | 53       ------------------[175     ( 27 Dec 2022 05:34 )  3.0  | x    | 152         Lipase:x      Amylase:x         Blood Gas Arterial, Lactate: 1.10 mmol/L (12-27-22 @ 14:19)  Blood Gas Arterial, Lactate: 1.40 mmol/L (12-27-22 @ 10:19)  Blood Gas Arterial, Lactate: 1.20 mmol/L (12-27-22 @ 03:32)  Blood Gas Arterial, Lactate: 1.10 mmol/L (12-26-22 @ 11:09)  Blood Gas Arterial, Lactate: 0.90 mmol/L (12-26-22 @ 04:09)  Blood Gas Arterial, Lactate: 1.60 mmol/L (12-25-22 @ 16:15)  Blood Gas Arterial, Lactate: 1.20 mmol/L (12-25-22 @ 04:08)    ABG - ( 27 Dec 2022 14:19 )  pH: 7.46  /  pCO2: 42    /  pO2: 99    / HCO3: 30    / Base Excess: 5.4   /  SaO2: 99.4        ABG - ( 27 Dec 2022 10:19 )  pH: 7.46  /  pCO2: 42    /  pO2: 76    / HCO3: 30    / Base Excess: 5.4   /  SaO2: 97.2        ABG - ( 27 Dec 2022 03:32 )  pH: 7.47  /  pCO2: 39    /  pO2: 164   / HCO3: 28    / Base Excess: 4.5   /  SaO2: 98.0          Coags:     15.40  ----< 1.34    ( 27 Dec 2022 22:44 )     38.9        IMAGING:  < from: Xray Chest 1 View- PORTABLE-Urgent (Xray Chest 1 View- PORTABLE-Urgent .) (12.27.22 @ 16:20) >    Stable right-sided pneumothorax with adjacent fibrotic right upper lobe.  Stable elevation right hemidiaphragm.    < end of copied text >      < from: Xray Chest 1 View- PORTABLE-Routine (12.27.22 @ 05:43) >  Moderate size right-sided pneumothorax, unchanged. Bilateral lung   opacities.    < end of copied text >

## 2022-12-28 NOTE — PROGRESS NOTE ADULT - REASON FOR ADMISSION
Right chest pain
Right chest pain now s/p pigtail 12/15 and s/p 20F right chest tube for R loculated pneumothorax
sob
sob

## 2022-12-28 NOTE — CONSULT NOTE ADULT - SUBJECTIVE AND OBJECTIVE BOX
Gastroenterology Consultation:    Patient is a 77y old  Female who presents with a chief complaint of sob (28 Dec 2022 15:38)    Admitted on: 12-15-22      HPI:    HPI:  77F PMH COPD on 3-4 L nasal cannula at home, A. fib on Eliquis, pneumothorax x2, h/o pleurodesis on the right 15 years ago, CHF, BP1, Depression, HTN, FLAVIO on CPAP, emphysema, PVD, h/o COVID requiring intubation, carotid angioplasty, cholecystectomy, TIA x3, here for chest pain 2/2 PTX s/p Chest tube. hospital course was complicated with septic shock. Overnight patient started developing bloody bowel movements. never had similar issue before. no abdominal pain. GI were consulted for hematochezia         Prior EGD/Colonoscopy:  last colonoscopy 15 years ago no records available     PAST MEDICAL & SURGICAL HISTORY:  Hypertension      Depression      COPD (chronic obstructive pulmonary disease)      Other cardiomyopathy      Other emphysema      PVD (peripheral vascular disease)      Bipolar 1 disorder      FLAVIO on CPAP      Transient ischemic attack  x 3      Congestive heart failure      Falls      2019 novel coronavirus disease (COVID-19)      Respiratory failure requiring intubation      Afib      HLD (hyperlipidemia)      History of cholecystectomy      H/O carotid angioplasty            FAMILY HISTORY:  FH: heart disease (Father, Mother)  No reported FH of gi cancers       Social History:  Tobacco: former smoker   Alcohol: no   Drugs: no     Home Medications:  albuterol 90 mcg/inh inhalation aerosol with adapter: 2 puff(s) inhaled every 4 hours, As Needed (23 Jun 2022 11:49)  amLODIPine 2.5 mg oral tablet: 1 tab(s) orally once a day (15 Dec 2022 18:22)  aspirin 81 mg oral tablet:  (18 Jun 2022 08:53)  atorvastatin 20 mg oral tablet: 1 tab(s) orally once a day (18 Jun 2022 08:53)  Breo Ellipta 200 mcg-25 mcg/inh inhalation powder: puff(s) inhaled once a day (18 Jun 2022 08:53)  Eliquis 5 mg oral tablet: 1 tab(s) orally 2 times a day (18 Jun 2022 08:53)  furosemide 40 mg oral tablet: 1 tab(s) orally once a day (15 Dec 2022 18:24)  gabapentin 400 mg oral capsule: 1 cap(s) orally 2 times a day (23 Jun 2022 11:44)  Incruse Ellipta 62.5 mcg/inh inhalation powder: 1 puff(s) inhaled every 24 hours (18 Jun 2022 08:53)  lamoTRIgine 100 mg oral tablet: 1 tab(s) orally 2 times a day (18 Jun 2022 08:53)  lidocaine 4% topical film: Apply topically to affected area once a day (21 Oct 2022 11:57)  LORazepam 0.5 mg oral tablet: 1  orally 2 times a day, As Needed for agitation (23 Jun 2022 11:44)  metoprolol tartrate 75 mg oral tablet: 1 tab(s) orally 2 times a day (18 Jun 2022 08:53)  montelukast 10 mg oral tablet: 1 tab(s) orally once a day (18 Jun 2022 08:53)  pantoprazole 40 mg oral delayed release tablet: 1 tab(s) orally once a day (18 Jun 2022 08:53)  polyethylene glycol 3350 oral powder for reconstitution: 17 gram(s) orally once a day (23 Jun 2022 12:08)  QUEtiapine 200 mg oral tablet: 250 milligram(s) orally once a day (at bedtime) (23 Jun 2022 11:44)  Xyzal: 1 tab(s) orally once a day (23 Jun 2022 11:44)        MEDICATIONS  (STANDING):  aspirin  chewable 81 milliGRAM(s) Oral daily  atorvastatin 20 milliGRAM(s) Oral at bedtime  atropine Injectable 0.6 milliGRAM(s) IV Push once  chlorhexidine 0.12% Liquid 15 milliLiter(s) Oral Mucosa two times a day  chlorhexidine 2% Cloths 1 Application(s) Topical <User Schedule>  EPINEPHrine    Infusion 0.006 MICROgram(s)/kG/Min (1 mL/Hr) IV Continuous <Continuous>  gabapentin 400 milliGRAM(s) Oral three times a day  lamoTRIgine 100 milliGRAM(s) Oral two times a day  LORazepam     Tablet 0.5 milliGRAM(s) Oral two times a day  methylPREDNISolone sodium succinate Injectable 40 milliGRAM(s) IV Push daily  montelukast 10 milliGRAM(s) Oral daily  morphine  - Injectable 1 milliGRAM(s) IV Push every 6 hours  morphine  Infusion. 3 mG/Hr (3 mL/Hr) IV Continuous <Continuous>  morphine  Infusion. 1 mG/Hr (1 mL/Hr) IV Continuous <Continuous>  norepinephrine Infusion 0.05 MICROgram(s)/kG/Min (4.07 mL/Hr) IV Continuous <Continuous>  norepinephrine Infusion 0.05 MICROgram(s)/kG/Min (8.5 mL/Hr) IV Continuous <Continuous>  pantoprazole  Injectable 40 milliGRAM(s) IV Push every 12 hours  phenylephrine    Infusion 0.1 MICROgram(s)/kG/Min (1.63 mL/Hr) IV Continuous <Continuous>  polyethylene glycol 3350 17 Gram(s) Oral every 12 hours  QUEtiapine 250 milliGRAM(s) Oral at bedtime  senna 2 Tablet(s) Oral at bedtime    MEDICATIONS  (PRN):  albuterol    90 MICROgram(s) HFA Inhaler 6 Puff(s) Inhalation every 6 hours PRN Shortness of Breath and/or Wheezing  albuterol/ipratropium for Nebulization 4 milliLiter(s) Nebulizer every 6 hours PRN Shortness of Breath and/or Wheezing  morphine  - Injectable 2 milliGRAM(s) IV Push every 15 minutes PRN breathlessness      Allergies  codeine (Other (Moderate))  Depakote (Unknown)  Dilaudid (Short breath; Rash)  IV Contrast (Anaphylaxis)  losartan (Angioedema)  Risperdal (Other)  verapamil (Short breath; Angioedema)      Review of Systems:   Constitutional:  No Fever, No Chills  ENT/Mouth:  No Hearing Changes,  No Difficulty Swallowing  Eyes:  No Eye Pain, No Vision Changes  Cardiovascular:  No Chest Pain, No Palpitations  Respiratory:  No Cough, No Dyspnea  Gastrointestinal:  As described in HPI  Musculoskeletal:  No Joint Swelling, No Back Pain  Skin:  No Skin Lesions, No Jaundice  Neuro:  No Syncope, No Dizziness  Heme/Lymph:  No Bruising, No Bleeding.          Physical Examination:  T(C): 36.7 (12-28-22 @ 12:00), Max: 37.6 (12-27-22 @ 20:00)  HR: 0 (12-28-22 @ 15:22) (0 - 126)  BP: 0/0 (12-28-22 @ 15:22) (0/0 - 158/59)  RR: 0 (12-28-22 @ 15:22) (0 - 41)  SpO2: 59% (12-28-22 @ 15:00) (59% - 100%)      12-26-22 @ 07:01  -  12-27-22 @ 07:00  --------------------------------------------------------  IN: 1657.5 mL / OUT: 2025 mL / NET: -367.5 mL    12-27-22 @ 07:01  -  12-28-22 @ 07:00  --------------------------------------------------------  IN: 1071 mL / OUT: 1370 mL / NET: -299 mL    12-28-22 @ 07:01  -  12-28-22 @ 16:14  --------------------------------------------------------  IN: 3040.4 mL / OUT: 310 mL / NET: 2730.4 mL          GENERAL: AAOx3, in respiratory distress  HEAD:  Atraumatic, Normocephalic  EYES: conjunctiva and sclera clear  NECK: Supple, no JVD or thyromegaly  CHEST/LUNG: Clear to auscultation bilaterally  HEART: Regular rate and rhythm; normal S1, S2, No murmurs.  ABDOMEN: Soft, nontender, nondistended STEFANY blood   NEUROLOGY: No asterixis or tremor.   SKIN: Intact, no jaundice        Data:                        9.2    15.15 )-----------( 299      ( 28 Dec 2022 11:00 )             28.6     Hgb Trend:  9.2  12-28-22 @ 11:00  8.0  12-28-22 @ 03:25  8.6  12-27-22 @ 22:44  9.1  12-27-22 @ 05:34  9.0  12-26-22 @ 05:29      12-27-22 @ 07:01  -  12-28-22 @ 07:00  --------------------------------------------------------  IN: 324 mL      12-28    138  |  102  |  33<H>  ----------------------------<  82  4.6   |  27  |  0.9    Ca    8.8      28 Dec 2022 04:10  Phos  4.6     12-28  Mg     2.2     12-28    TPro  5.5<L>  /  Alb  3.1<L>  /  TBili  0.6  /  DBili  x   /  AST  41  /  ALT  119<H>  /  AlkPhos  153<H>  12-28    Liver panel trend:  TBili 0.6   /   AST 41   /      /   AlkP 153   /   Tptn 5.5   /   Alb 3.1    /   DBili --      12-28  TBili 0.3   /   AST 53   /      /   AlkP 175   /   Tptn 5.7   /   Alb 3.0    /   DBili --      12-27  TBili 0.3   /   AST 48   /      /   AlkP 170   /   Tptn 5.0   /   Alb 2.8    /   DBili --      12-26  TBili 0.2   /   AST 62   /      /   AlkP 188   /   Tptn 5.2   /   Alb 2.8    /   DBili --      12-25  TBili 0.2   /      /      /   AlkP 237   /   Tptn 5.5   /   Alb 2.7    /   DBili --      12-24  TBili 0.2   /      /      /   AlkP 263   /   Tptn 6.1   /   Alb 2.9    /   DBili --      12-23  TBili 0.2   /      /      /   AlkP 284   /   Tptn 6.0   /   Alb 2.9    /   DBili --      12-22  TBili <0.2   /      /      /   AlkP 235   /   Tptn 5.2   /   Alb 2.8    /   DBili --      12-21  TBili 0.3   /   AST 2075   /   ALT 1023   /   AlkP 303   /   Tptn 6.5   /   Alb 3.0    /   DBili --      12-20  TBili 0.4   /   AST 35   /   ALT 17   /   AlkP 280   /   Tptn 6.2   /   Alb 3.0    /   DBili --      12-19  TBili 0.3   /   AST 44   /   ALT 21   /   AlkP 370   /   Tptn 8.0   /   Alb 4.1    /   DBili --      12-18      PT/INR - ( 27 Dec 2022 22:44 )   PT: 15.40 sec;   INR: 1.34 ratio         PTT - ( 27 Dec 2022 22:44 )  PTT:38.9 sec        Radiology:

## 2022-12-28 NOTE — PROGRESS NOTE ADULT - PROVIDER SPECIALTY LIST ADULT
MICU
Thoracic Surgery
Critical Care
MICU
Pulmonology
Pulmonology
Thoracic Surgery
Critical Care
Critical Care
Surgery
Thoracic Surgery
Critical Care
Surgery
Critical Care

## 2022-12-28 NOTE — CONSULT NOTE ADULT - ASSESSMENT
77F PMH COPD on 3-4 L nasal cannula at home, A. fib on Eliquis, pneumothorax x2, h/o pleurodesis on the right 15 years ago, CHF, BP1, Depression, HTN, FLAVIO on CPAP, emphysema, PVD, h/o COVID requiring intubation, carotid angioplasty, cholecystectomy, TIA x3, here for chest pain 2/2 PTX s/p Chest tube. hospital course was complicated with septic shock and Hematochezia.     # Hematochezia likely lower GI bleeding (Hemorrhoids vs diverticulosis vs AVMS vs malignancy)   Patient was hemodynamically when evaluated by GI. STEFANY reveled blood and brown stool.   Current status was discussed with the patient and her daughter at bedside. I discussed the risks and benefits of endoscopy with the patient and her daughter who decided to decline any endoscopic intervention given high preoperative risk. Patient opted to continue with conservative management. Later today patient decompensated further and , son and daughter wanted to pursue comfort measures per the discussion with the primary team.

## 2022-12-28 NOTE — DISCHARGE NOTE FOR THE EXPIRED PATIENT - HOSPITAL COURSE
77F PMH COPD on 3-4 L nasal cannula at home, A. fib on Eliquis, pneumothorax x2, h/o pleurodesis on the right 15 years ago, CHF, BP1, Depression, HTN, FLAVIO on CPAP, emphysema, PVD, h/o COVID requiring intubation, carotid angioplasty, cholecystectomy, TIA x3, here for evaluation of atraumatic right upper back pain and shortness of breath that began this morning upon awakening around 6 AM.  Patient denies fever, chills, worsening cough, chest pain. Patient was transferred to [facility] from [facility] with complicated PTX.    A pigtail was placed to drain the pneumothorax after the admission. A few days later patient was transferred to the MICU for increasing oxygen requirements and SOB with pain. She later decompensated and was intubated and started on pressors. For the worsening pneumothorax, a surgical chest tube was inserted. Over the course, the pneumo remained same and an endobronchial valve was placed. The airleak was controlled, but pneumothorax remained. She improved progressively after that and was extubated with high risk for re-intubation.     She later developed bloody bowel moments requiring a blood transfusion. GI was consulted and her anti-coagulation was withheld. Following this she had an episode of sudden breathlessness and hypotension. She required pressors and due to her worsening status goals of care was discussed with the family. Family wanted to respect her wishes and opted for DNR/DNI and later for comfort measures after explaining the poor prognosis of the current medical status.    Following this, patient passed away peacefully at 3:22pm on 12/28/2022 with family at bedside.

## 2022-12-28 NOTE — SWALLOW BEDSIDE ASSESSMENT ADULT - SWALLOW EVAL: DIAGNOSIS
suspected pharyngeal dysphagia; + toleration observed without overt symptoms of penetration/aspiration for thins via small controlled sips

## 2022-12-28 NOTE — CHART NOTE - NSCHARTNOTEFT_GEN_A_CORE
Pt had large BBM, at first, vitals remained stable. Stat labs showed a hb drop from 9.1 to 8.6. Pt then had 5 more BBMs. Her BP dropped to 90s systolic and pt was tachy to low 100s. pt was started on fluids. Levo was titrated to maintain BP. Call was placed to GI answering service. 2:30am Pt had large BBM, at first, vitals remained stable. Stat labs showed a hb drop from 9.1 to 8.6. Pt then had 5 more BBMs. Her BP dropped to 90s systolic and pt was tachy to low 100s. pt was started on fluids. Levo was titrated to maintain BP. GI consulted.   4:30am GI recommended urgent CTA a/p with contrast. Pt has allergy to contrast so protocol with 40 solumedrol, followed by 40 solumedrol +50 benadryl 4 hrs later, and CT scan 1 hr after second dose was initiated. I spoke with CT scan to coordinate time of exam.   Pt  Juno was updated on situation and possible need for colonoscopy depending on results of CT. He said he needs to discuss with his children whether they want pt going for colonoscopy given the likely need for intubation.   Pt vitals remain stable, Levo titrated down.   Pt transfused 1 unit given drop in Hb to 8.0 2:30am Pt had large BBM, at first, vitals remained stable. Stat labs showed a hb drop from 9.1 to 8.6. Pt then had 5 more BBMs. Her BP dropped to 90s systolic and pt was tachy to low 100s. pt was started on fluids. Levo was titrated to maintain BP. GI consulted.   4:30am GI recommended urgent CTA a/p with contrast. Pt has allergy to contrast so protocol with 40 solumedrol, followed by 40 solumedrol +50 benadryl 4 hrs later, and CT scan 1 hr after second dose was initiated. I spoke with CT scan to coordinate time of exam.   Pt  Juno was updated on situation and possible need for colonoscopy depending on results of CT. He said he needs to discuss with his children whether they want pt going for colonoscopy given the likely need for intubation.   Pt vitals remain stable, Levo titrated down.   Pt transfused 1 unit given drop in Hb to 8.0  5am Pt  and daughter called and decided to remove DNI order so that pt can be intubated for possible colonoscopy procedure.   transfusion started, will begin to give steroids after transfusion so that steroids will not mask possible transfusion reaction.

## 2022-12-28 NOTE — PROGRESS NOTE ADULT - ASSESSMENT
77y F w/ PMHx of O2 dependent COPD, A fib on Eliquis, Hx R pleurodesis d/t PTX in the past, CHF, BP1, depression, FLAVIO on CPAP, PVD, carotid angioplasty, cholecystectomy, TIA x 3, and Hx COVID requiring Intubation. She was admitted on 12/15/22 with R chest pain/SOB, and found to have right loculated PTX.     PLAN:  - Care per primary team  - Chest tube to water seal  - F/u CXR showed stable PTX.  - Monitor chest tube output and air leaks  - Daily AM CXR  - Monitor vitals and labs  - DVT ppx, GI ppx  - Bowel regimen    Thoracic Surgery  Spectra [phone]

## 2023-01-07 NOTE — CHART NOTE - NSCHARTNOTEFT_GEN_A_CORE
DATE OF PROCEDURE: 12/22/2022    PREOPERATIVE DIAGNOSIS:  Persistent right sided airleak    POSTOPERATIVE DIAGNOSES:  RUL bronchopleural fistula      PROCEDURE PERFORMED:  Bronchoscopy with endobrbonchial valve insertion         Attending: Dr. Jordan         ASSISTANT: Dr. Madera, Chief Pulmonary and Critical Care Fellow         ANESTHESIA: as per RN record    CONSENT: After explaining the risk and benefit the consent was obtained from       The patient had been previously intubated and was on mechanical ventilatory support. He was on sedation, which was continued and adjusted during the procedure. His FiO2 was increased to 100% during the procedure. The  fiberoptic bronchoscope was introduced through an endotracheal tube adaptor and the tip of the endotracheal tube was noted to be in good position above the guille.     After inspection of the tracheobronchial tree, endobronchial valves were inserted in RUL apical, posterior, and anterior segments. Total of 3 endobronchial valves were inserted with resolution of the persistent air leak. DATE OF PROCEDURE: 12/22/2022    PREOPERATIVE DIAGNOSIS:  Persistent right sided airleak    POSTOPERATIVE DIAGNOSES:  RUL bronchopleural fistula      PROCEDURE PERFORMED:  Bronchoscopy with endobrbonchial valve insertion         Attending: Dr. Jordan         ASSISTANT: Dr. Madera, Chief Pulmonary and Critical Care Fellow         ANESTHESIA: as per RN record    CONSENT: After explaining the risk and benefit the consent was obtained from       The patient had been previously intubated and was on mechanical ventilatory support. He was on sedation, which was continued and adjusted during the procedure. Her FiO2 was increased to 100% during the procedure. The  fiberoptic bronchoscope was introduced through an endotracheal tube adaptor and the tip of the endotracheal tube was noted to be in good position above the guille.     After inspection of the tracheobronchial tree, endobronchial valves were inserted in RUL apical, posterior, and anterior segments. Total of 3 endobronchial valves were inserted with resolution of the persistent air leak.

## 2023-01-11 DIAGNOSIS — I50.9 HEART FAILURE, UNSPECIFIED: ICD-10-CM

## 2023-01-11 DIAGNOSIS — Z79.01 LONG TERM (CURRENT) USE OF ANTICOAGULANTS: ICD-10-CM

## 2023-01-11 DIAGNOSIS — J43.8 OTHER EMPHYSEMA: ICD-10-CM

## 2023-01-11 DIAGNOSIS — F31.9 BIPOLAR DISORDER, UNSPECIFIED: ICD-10-CM

## 2023-01-11 DIAGNOSIS — M19.90 UNSPECIFIED OSTEOARTHRITIS, UNSPECIFIED SITE: ICD-10-CM

## 2023-01-11 DIAGNOSIS — R65.21 SEVERE SEPSIS WITH SEPTIC SHOCK: ICD-10-CM

## 2023-01-11 DIAGNOSIS — J93.82 OTHER AIR LEAK: ICD-10-CM

## 2023-01-11 DIAGNOSIS — E83.42 HYPOMAGNESEMIA: ICD-10-CM

## 2023-01-11 DIAGNOSIS — I42.8 OTHER CARDIOMYOPATHIES: ICD-10-CM

## 2023-01-11 DIAGNOSIS — J93.0 SPONTANEOUS TENSION PNEUMOTHORAX: ICD-10-CM

## 2023-01-11 DIAGNOSIS — R00.1 BRADYCARDIA, UNSPECIFIED: ICD-10-CM

## 2023-01-11 DIAGNOSIS — J86.0 PYOTHORAX WITH FISTULA: ICD-10-CM

## 2023-01-11 DIAGNOSIS — F13.239 SEDATIVE, HYPNOTIC OR ANXIOLYTIC DEPENDENCE WITH WITHDRAWAL, UNSPECIFIED: ICD-10-CM

## 2023-01-11 DIAGNOSIS — J93.9 PNEUMOTHORAX, UNSPECIFIED: ICD-10-CM

## 2023-01-11 DIAGNOSIS — J96.12 CHRONIC RESPIRATORY FAILURE WITH HYPERCAPNIA: ICD-10-CM

## 2023-01-11 DIAGNOSIS — I73.9 PERIPHERAL VASCULAR DISEASE, UNSPECIFIED: ICD-10-CM

## 2023-01-11 DIAGNOSIS — K92.1 MELENA: ICD-10-CM

## 2023-01-11 DIAGNOSIS — Z51.5 ENCOUNTER FOR PALLIATIVE CARE: ICD-10-CM

## 2023-01-11 DIAGNOSIS — I25.10 ATHEROSCLEROTIC HEART DISEASE OF NATIVE CORONARY ARTERY WITHOUT ANGINA PECTORIS: ICD-10-CM

## 2023-01-11 DIAGNOSIS — I48.0 PAROXYSMAL ATRIAL FIBRILLATION: ICD-10-CM

## 2023-01-11 DIAGNOSIS — I11.0 HYPERTENSIVE HEART DISEASE WITH HEART FAILURE: ICD-10-CM

## 2023-01-11 DIAGNOSIS — E78.5 HYPERLIPIDEMIA, UNSPECIFIED: ICD-10-CM

## 2023-01-11 DIAGNOSIS — F41.9 ANXIETY DISORDER, UNSPECIFIED: ICD-10-CM

## 2023-01-11 DIAGNOSIS — A41.9 SEPSIS, UNSPECIFIED ORGANISM: ICD-10-CM

## 2023-01-11 DIAGNOSIS — I27.20 PULMONARY HYPERTENSION, UNSPECIFIED: ICD-10-CM

## 2023-01-11 DIAGNOSIS — Z99.81 DEPENDENCE ON SUPPLEMENTAL OXYGEN: ICD-10-CM

## 2023-01-11 DIAGNOSIS — R57.8 OTHER SHOCK: ICD-10-CM

## 2023-01-11 DIAGNOSIS — J96.21 ACUTE AND CHRONIC RESPIRATORY FAILURE WITH HYPOXIA: ICD-10-CM

## 2023-01-11 DIAGNOSIS — G40.909 EPILEPSY, UNSPECIFIED, NOT INTRACTABLE, WITHOUT STATUS EPILEPTICUS: ICD-10-CM

## 2023-01-11 DIAGNOSIS — G47.33 OBSTRUCTIVE SLEEP APNEA (ADULT) (PEDIATRIC): ICD-10-CM

## 2023-01-11 DIAGNOSIS — Z66 DO NOT RESUSCITATE: ICD-10-CM

## 2023-01-11 NOTE — PATIENT PROFILE ADULT - NSPROGENSOURCEINFO_GEN_A_NUR
How Severe Is Your Eczema?: moderate Is This A New Presentation, Or A Follow-Up?: Follow Up Eczema patient

## 2023-01-22 NOTE — ED PROVIDER NOTE - NS ED ROS FT
Gen: No fever, chills.   Eyes:  No visual changes, eye pain or discharge.  ENMT:  No hearing changes, pain, no sore throat or runny nose, no difficulty swallowing  Cardiac:  No chest pain, edema. No chest pain with exertion.  Respiratory:  SOB. No cough. No wheezing.   GI:  LUQ pain. No nausea, vomiting, diarrhea.  :  No dysuria, frequency or burning.  MS:  No myalgia, muscle weakness, joint pain or back pain.  Neuro:  No headache or weakness.  No LOC.  Skin:  No skin rash.   Endocrine: No history of thyroid disease or diabetes. 75 Additional Progress Note...

## 2023-02-07 ENCOUNTER — APPOINTMENT (OUTPATIENT)
Age: 78
End: 2023-02-07

## 2023-02-23 NOTE — ED ADULT NURSE NOTE - NS ED NOTE ABUSE RESPONSE YN
MSW met with the patient at the bedside. Patients wife  is also at the bedside.     Patient is alert and oriented with no communication barriers.     Patient reported having and DME in the home. ([facility])     Patients PCP is correct on the face sheet.     Patient choice pharmacy is bedside delivery.     Patients family will transport the patient home at discharge.     CM team will continue to follow.      02/23/23 1531   Discharge Assessment   Assessment Type Discharge Planning Assessment   Confirmed/corrected address, phone number and insurance Yes   Confirmed Demographics Correct on Facesheet   Source of Information patient;family   People in Home significant other   Do you expect to return to your current living situation? Yes   Do you have help at home or someone to help you manage your care at home? Yes   Prior to hospitilization cognitive status: Alert/Oriented   Current cognitive status: Alert/Oriented   Equipment Currently Used at Home walker, rolling;cane, quad;crutches;bedside commode;shower chair   Readmission within 30 days? No   Patient currently being followed by outpatient case management? No   Do you currently have service(s) that help you manage your care at home? No   Do you take prescription medications? Yes   Do you have prescription coverage? Yes   Do you have any problems affording any of your prescribed medications? No   Is the patient taking medications as prescribed? yes   How do you get to doctors appointments? car, drives self;family or friend will provide   Are you on dialysis? No  (Diabetic)   Do you take coumadin? No   Discharge Plan A Home with family;Home Health   Discharge Plan B Skilled Nursing Facility;Rehab   DME Needed Upon Discharge  none   Discharge Plan discussed with: Spouse/sig other;Patient   Discharge Barriers Identified None        Yes

## 2023-03-09 ENCOUNTER — APPOINTMENT (OUTPATIENT)
Dept: CARDIOLOGY | Facility: CLINIC | Age: 78
End: 2023-03-09

## 2023-04-19 NOTE — ED ADULT NURSE NOTE - DOES PATIENT HAVE ADVANCE DIRECTIVE
Yes 23 y/o PPD#1 s/p VAVD, s/p postpartum hemorrhage with blood transfusion for acute blood loss anemia, stable, doing well. Follow up AM labs. Routine postpartum care. Anticipate d/c home. PPD1 s/p VAVD  post partum hemorrhage, acute blood loss anemia from 10 to 7  repeat this AM was 6.7, patient is asymtpomatic  will repeat CBC< if HGB is above 7 AND patient is asymptomatic, will continue to observe  if HGB is below 7 or above 7 with symptoms of anemia, will transfuse pRBC  precautions given to patient if she feels dizzy etc when ambulating, let us know, will reassess  otherwise continue routine prenatal care VS are WNL at this time  RH+

## 2023-05-07 NOTE — CHART NOTE - NSCHARTNOTEFT_GEN_A_CORE
SICU Transfer Note    SHAUN COATES  75y (1945)  [id number]    Transfer from: SICU  Transfer to: Surgery-Vascular    SICU COURSE:  HD# 10d    74yo female pmhx of COPD on 5L home oxygen, paroxysmal afib, carotid stenosis (L 80%, R 40%), HFpEF (EF 70% Dec 2020), HTN, HLD, bipolar disorder currently s/p left TCAR procedure. Patient presented to the ED with c/o of pain while eating/swallowing and associated chest pain. Patient admitted for ACS workup and while inpatient scheduled TCAR expedited. Patient was seen previously by vascular surgery during Oct 2020 admission for TIA workup which was negative for any neurological events. Carotid duplex during that admission showed >80% stenosis of left ICA and scheduled for TCAR Dec 15, 2020.    As per Hospitalist recs, patient currently on Pradaxa (held 24hrs prior to OR), ASA and Plavix (started since TIA event). Patient underwent said procedure and tolerated it well, extubated in PACU. Patient has goal , started on peripheral barb. Patient to continue Plavix and ASA per vascular fellow. Complaining of pain in left neck but no neurological defecits on exam. SICU consulted for q1 neurovascular checks.    SICU Course complicated by being stroke code with no imaging findings. Patient also re-swabbed and was positive for COVID-19. ID consulted, given 2u of convalescent plasma and started on remdesivir and dexamethasone.      PAST MEDICAL & SURGICAL HISTORY:  Falls  Congestive heart failure  Transient ischemic attack  FLAVIO on CPAP  Bipolar 1 disorder  Allergic reaction  PVD (peripheral vascular disease)  Other emphysema  Other cardiomyopathy  TIA (transient ischemic attack)  COPD (chronic obstructive pulmonary disease)  Depression  Hypertension  History of cholecystectomy    Allergies  codeine (Other (Moderate))  Depakote (Unknown)  Dilaudid (Short breath; Rash)  IV Contrast (Anaphylaxis)  losartan (Angioedema)  Risperdal (Other)  verapamil (Short breath; Angioedema)    Intolerances      MEDICATIONS  (STANDING):  ALBUTerol    90 MICROgram(s) HFA Inhaler 2 Puff(s) Inhalation every 6 hours  aspirin  chewable 81 milliGRAM(s) Oral daily  atorvastatin 20 milliGRAM(s) Oral at bedtime  budesonide 160 MICROgram(s)/formoterol 4.5 MICROgram(s) Inhaler 2 Puff(s) Inhalation two times a day  chlorhexidine 4% Liquid 1 Application(s) Topical <User Schedule>  clopidogrel Tablet 75 milliGRAM(s) Oral daily  dabigatran 150 milliGRAM(s) Oral every 12 hours  dexAMETHasone  Injectable 6 milliGRAM(s) IV Push daily  famotidine    Tablet 20 milliGRAM(s) Oral daily  furosemide    Tablet 40 milliGRAM(s) Oral daily  gabapentin 300 milliGRAM(s) Oral three times a day  ipratropium 17 MICROgram(s) HFA Inhaler 1 Puff(s) Inhalation every 6 hours  lamoTRIgine 100 milliGRAM(s) Oral daily  metoprolol tartrate 12.5 milliGRAM(s) Oral every 8 hours  QUEtiapine 200 milliGRAM(s) Oral at bedtime  remdesivir  IVPB 100 milliGRAM(s) IV Intermittent every 24 hours  tiotropium 18 MICROgram(s) Capsule 1 Capsule(s) Inhalation daily    MEDICATIONS  (PRN):  acetaminophen   Tablet .. 650 milliGRAM(s) Oral every 6 hours PRN Temp greater or equal to 38.5C (101.3F), Mild Pain (1 - 3)  bisacodyl 5 milliGRAM(s) Oral every 12 hours PRN Constipation  LORazepam     Tablet 0.5 milliGRAM(s) Oral every 6 hours PRN Anxiety      Vital Signs Last 24 Hrs  T(C): 36.2 (09 Dec 2020 08:00), Max: 37.4 (08 Dec 2020 22:30)  T(F): 97.2 (09 Dec 2020 08:00), Max: 99.3 (08 Dec 2020 22:30)  HR: 84 (09 Dec 2020 14:00) (68 - 95)  BP: 121/58 (09 Dec 2020 14:00) (101/50 - 188/78)  BP(mean): 82 (09 Dec 2020 14:00) (74 - 114)  RR: 18 (09 Dec 2020 14:00) (16 - 24)  SpO2: 97% (09 Dec 2020 14:00) (86% - 97%)  I&O's Summary    08 Dec 2020 07:01  -  09 Dec 2020 07:00  --------------------------------------------------------  IN: 1562 mL / OUT: 2050 mL / NET: -488 mL    09 Dec 2020 07:01  -  09 Dec 2020 14:43  --------------------------------------------------------  IN: 250 mL / OUT: 500 mL / NET: -250 mL        LABS  LABS:  CAPILLARY BLOOD GLUCOSE                              9.6    3.71  )-----------( 162      ( 09 Dec 2020 02:00 )             30.2       12-09    139  |  108  |  15  ----------------------------<  148<H>  4.5   |  21  |  0.8    Ca    8.6      09 Dec 2020 02:00  Phos  3.7     12-09  Mg     2.2     12-09        PT/INR - ( 09 Dec 2020 02:00 )   PT: 14.10 sec;   INR: 1.23 ratio         PTT - ( 09 Dec 2020 02:00 )  PTT:41.9 sec          Culture - Blood (collected 07 Dec 2020 11:28)  Source: .Blood Blood-Peripheral  Preliminary Report (08 Dec 2020 23:01):    No growth to date.            Assessment & Plan:      75y Female HD#5 POD#0 s/p left TCAR, right groin access 2/2 >80% stenosis of left ICA, recent TIA    NEURO:    Acute pain-controlled with  Tylenol, avoid narcotics    s/p Left Tcar -q2 neurochecks    hx of TIA-on ASA  -stroke code - patient difficult word finding  -CT hd: No evidence of acute intracranial abnormality  -CT: Normal perfusion images of the brain.  -MRI brain: no acute infact  -keep SBP >110  -started on ASA/plavix/pradaxa  --EEG done -->  generalized slowing   -TSH, ammonia  -h/o Bipolar - restarted all psych meds, lamictal, seroquel, ativan prn    RESP:     Oxygen insufficiency-wean to 5L nc (on home 5L oxygen)    hx of COPD----On Albuterol inhalers     hx of smoking (quit 10+yrs)-encourage IS    Pulm c/s-recs triple inhaler therapy, keep sat >92%    Activity-OOB2C      CARDS:     SBP continue to keep goal 110, now off barb    Vasc sx team made aware of possible stent narrowing, awaiting recs    hx of paroxAfib -on pradaxa, on home metoprolol     hx of hld-restarted atorvastatin    Imaging: post op EKG NSR    Labs: CE x3 negative     GI/NUTR:     Diet-CC Regular    GI Prophylaxis-Pepcid (home rx)    /RENAL:     Monitor UO, voiding without difficulty    BUN/Cr- 22/0.9 stable    lytes:        HEME/ONC:     DVT prophylaxis- Pradaxa, ASA/Plavix    Hb/Hct: 10.7 > 10.1  -DVT sono: final  neg    ID:  WBC 5.7, tmax 101  D/C all abx  Per ID Convalexcent plasma 200 x1, Remdesivir 200x1, Remdesivir 100 x4 days   pan culture sent  f/u bld cx, COVID  UA negative    ENDO:  -RISS  -A1c 5.2%  Dexamethasone 6q24    LINES/DRAINS:  PIV                Follow Up:  -2330 labs        Signed out to:  Date:  Time: SICU Transfer Note    SHAUN COATES  75y (1945)  [id number]    Transfer from: SICU  Transfer to: Surgery-Vascular    SICU COURSE:  HD# 10d    74yo female pmhx of COPD on 5L home oxygen, paroxysmal afib, carotid stenosis (L 80%, R 40%), HFpEF (EF 70% Dec 2020), HTN, HLD, bipolar disorder currently s/p left TCAR procedure. Patient presented to the ED with c/o of pain while eating/swallowing and associated chest pain. Patient admitted for ACS workup and while inpatient scheduled TCAR expedited. Patient was seen previously by vascular surgery during Oct 2020 admission for TIA workup which was negative for any neurological events. Carotid duplex during that admission showed >80% stenosis of left ICA and scheduled for TCAR Dec 15, 2020.    As per Hospitalist recs, patient currently on Pradaxa (held 24hrs prior to OR), ASA and Plavix (started since TIA event). Patient underwent said procedure and tolerated it well, extubated in PACU. Patient has goal , started on peripheral barb. Patient to continue Plavix and ASA per vascular fellow. Complaining of pain in left neck but no neurological defecits on exam. SICU consulted for q1 neurovascular checks.    SICU Course complicated by being stroke code with no imaging findings. Patient also re-swabbed and was positive for COVID-19. ID consulted, given 2u of convalescent plasma and started on remdesivir and dexamethasone.      PAST MEDICAL & SURGICAL HISTORY:  Falls  Congestive heart failure  Transient ischemic attack  FLAVIO on CPAP  Bipolar 1 disorder  Allergic reaction  PVD (peripheral vascular disease)  Other emphysema  Other cardiomyopathy  TIA (transient ischemic attack)  COPD (chronic obstructive pulmonary disease)  Depression  Hypertension  History of cholecystectomy    Allergies  codeine (Other (Moderate))  Depakote (Unknown)  Dilaudid (Short breath; Rash)  IV Contrast (Anaphylaxis)  losartan (Angioedema)  Risperdal (Other)  verapamil (Short breath; Angioedema)    Intolerances      MEDICATIONS  (STANDING):  ALBUTerol    90 MICROgram(s) HFA Inhaler 2 Puff(s) Inhalation every 6 hours  aspirin  chewable 81 milliGRAM(s) Oral daily  atorvastatin 20 milliGRAM(s) Oral at bedtime  budesonide 160 MICROgram(s)/formoterol 4.5 MICROgram(s) Inhaler 2 Puff(s) Inhalation two times a day  chlorhexidine 4% Liquid 1 Application(s) Topical <User Schedule>  clopidogrel Tablet 75 milliGRAM(s) Oral daily  dabigatran 150 milliGRAM(s) Oral every 12 hours  dexAMETHasone  Injectable 6 milliGRAM(s) IV Push daily  famotidine    Tablet 20 milliGRAM(s) Oral daily  furosemide    Tablet 40 milliGRAM(s) Oral daily  gabapentin 300 milliGRAM(s) Oral three times a day  ipratropium 17 MICROgram(s) HFA Inhaler 1 Puff(s) Inhalation every 6 hours  lamoTRIgine 100 milliGRAM(s) Oral daily  metoprolol tartrate 12.5 milliGRAM(s) Oral every 8 hours  QUEtiapine 200 milliGRAM(s) Oral at bedtime  remdesivir  IVPB 100 milliGRAM(s) IV Intermittent every 24 hours  tiotropium 18 MICROgram(s) Capsule 1 Capsule(s) Inhalation daily    MEDICATIONS  (PRN):  acetaminophen   Tablet .. 650 milliGRAM(s) Oral every 6 hours PRN Temp greater or equal to 38.5C (101.3F), Mild Pain (1 - 3)  bisacodyl 5 milliGRAM(s) Oral every 12 hours PRN Constipation  LORazepam     Tablet 0.5 milliGRAM(s) Oral every 6 hours PRN Anxiety      Vital Signs Last 24 Hrs  T(C): 36.2 (09 Dec 2020 08:00), Max: 37.4 (08 Dec 2020 22:30)  T(F): 97.2 (09 Dec 2020 08:00), Max: 99.3 (08 Dec 2020 22:30)  HR: 84 (09 Dec 2020 14:00) (68 - 95)  BP: 121/58 (09 Dec 2020 14:00) (101/50 - 188/78)  BP(mean): 82 (09 Dec 2020 14:00) (74 - 114)  RR: 18 (09 Dec 2020 14:00) (16 - 24)  SpO2: 97% (09 Dec 2020 14:00) (86% - 97%)  I&O's Summary    08 Dec 2020 07:01  -  09 Dec 2020 07:00  --------------------------------------------------------  IN: 1562 mL / OUT: 2050 mL / NET: -488 mL    09 Dec 2020 07:01  -  09 Dec 2020 14:43  --------------------------------------------------------  IN: 250 mL / OUT: 500 mL / NET: -250 mL        LABS  LABS:  CAPILLARY BLOOD GLUCOSE                              9.6    3.71  )-----------( 162      ( 09 Dec 2020 02:00 )             30.2       12-09    139  |  108  |  15  ----------------------------<  148<H>  4.5   |  21  |  0.8    Ca    8.6      09 Dec 2020 02:00  Phos  3.7     12-09  Mg     2.2     12-09        PT/INR - ( 09 Dec 2020 02:00 )   PT: 14.10 sec;   INR: 1.23 ratio         PTT - ( 09 Dec 2020 02:00 )  PTT:41.9 sec          Culture - Blood (collected 07 Dec 2020 11:28)  Source: .Blood Blood-Peripheral  Preliminary Report (08 Dec 2020 23:01):    No growth to date.            Assessment & Plan:      75y Female HD#5 POD#0 s/p left TCAR, right groin access 2/2 >80% stenosis of left ICA, recent TIA    NEURO:    Acute pain-controlled with  Tylenol, avoid narcotics    s/p Left Tcar -q2 neurochecks    hx of TIA-on ASA  -stroke code - patient difficult word finding  -CT hd: No evidence of acute intracranial abnormality  -CT: Normal perfusion images of the brain.  -MRI brain: no acute infact  -keep SBP >110  -started on ASA/plavix/pradaxa  --EEG done -->  generalized slowing   -h/o Bipolar - restarted all psych meds, lamictal, seroquel, ativan prn    RESP:     Oxygen insufficiency-wean to 5L nc (on home 5L oxygen)    hx of COPD----On Albuterol inhalers     hx of smoking (quit 10+yrs)-encourage IS    Pulm c/s-recs triple inhaler therapy, keep sat >92%    Activity-OOB2C      CARDS:     SBP continue to keep goal 110, now off barb    Vasc sx team made aware of possible stent narrowing, awaiting recs    hx of paroxAfib -on pradaxa, on home metoprolol     hx of hld-restarted atorvastatin    Imaging: post op EKG NSR    Labs: CE x3 negative     GI/NUTR:     Diet-CC Regular    GI Prophylaxis-Pepcid (home rx)    /RENAL:     Monitor UO, voiding without difficulty    BUN/Cr- 22/0.9 stable    lytes:        HEME/ONC:     DVT prophylaxis- Pradaxa, ASA/Plavix    Hb/Hct: 10.7 > 10.1  -DVT sono: final  neg    ID:  WBC 5.7, tmax 101  D/C all abx  Per ID Convalexcent plasma 200 x1, Remdesivir 200x1, Remdesivir 100 x4 days   pan culture sent  f/u bld cx, COVID  UA negative    ENDO:  -RISS  -A1c 5.2%  Dexamethasone 6q24    LINES/DRAINS:  PIV                Follow Up:  -2330 labs  -final blood culture results        Signed out to: Jesus Ndiaye MD  Date: 12/9/2020  Time: 1522 SICU Transfer Note    SHAUN COATES  75y (1945)  [id number]    Transfer from: SICU  Transfer to: Surgery-Vascular    SICU COURSE:  HD# 10d    74yo female pmhx of COPD on 5L home oxygen, paroxysmal afib, carotid stenosis (L 80%, R 40%), HFpEF (EF 70% Dec 2020), HTN, HLD, bipolar disorder currently s/p left TCAR procedure. Patient presented to the ED with c/o of pain while eating/swallowing and associated chest pain. Patient admitted for ACS workup and while inpatient scheduled TCAR expedited. Patient was seen previously by vascular surgery during Oct 2020 admission for TIA workup which was negative for any neurological events. Carotid duplex during that admission showed >80% stenosis of left ICA and scheduled for TCAR Dec 15, 2020.    As per Hospitalist recs, patient currently on Pradaxa (held 24hrs prior to OR), ASA and Plavix (started since TIA event). Patient underwent said procedure and tolerated it well, extubated in PACU. Patient has goal , started on peripheral barb. Patient to continue Plavix and ASA per vascular fellow. Complaining of pain in left neck but no neurological defecits on exam. SICU consulted for q1 neurovascular checks.    SICU Course complicated by being stroke code with no imaging findings. Patient also re-swabbed and was positive for COVID-19. ID consulted, given 2u of convalescent plasma and started on remdesivir and dexamethasone.      PAST MEDICAL & SURGICAL HISTORY:  Falls  Congestive heart failure  Transient ischemic attack  FLAVIO on CPAP  Bipolar 1 disorder  Allergic reaction  PVD (peripheral vascular disease)  Other emphysema  Other cardiomyopathy  TIA (transient ischemic attack)  COPD (chronic obstructive pulmonary disease)  Depression  Hypertension  History of cholecystectomy    Allergies  codeine (Other (Moderate))  Depakote (Unknown)  Dilaudid (Short breath; Rash)  IV Contrast (Anaphylaxis)  losartan (Angioedema)  Risperdal (Other)  verapamil (Short breath; Angioedema)    Intolerances      MEDICATIONS  (STANDING):  ALBUTerol    90 MICROgram(s) HFA Inhaler 2 Puff(s) Inhalation every 6 hours  aspirin  chewable 81 milliGRAM(s) Oral daily  atorvastatin 20 milliGRAM(s) Oral at bedtime  budesonide 160 MICROgram(s)/formoterol 4.5 MICROgram(s) Inhaler 2 Puff(s) Inhalation two times a day  chlorhexidine 4% Liquid 1 Application(s) Topical <User Schedule>  clopidogrel Tablet 75 milliGRAM(s) Oral daily  dabigatran 150 milliGRAM(s) Oral every 12 hours  dexAMETHasone  Injectable 6 milliGRAM(s) IV Push daily  famotidine    Tablet 20 milliGRAM(s) Oral daily  furosemide    Tablet 40 milliGRAM(s) Oral daily  gabapentin 300 milliGRAM(s) Oral three times a day  ipratropium 17 MICROgram(s) HFA Inhaler 1 Puff(s) Inhalation every 6 hours  lamoTRIgine 100 milliGRAM(s) Oral daily  metoprolol tartrate 12.5 milliGRAM(s) Oral every 8 hours  QUEtiapine 200 milliGRAM(s) Oral at bedtime  remdesivir  IVPB 100 milliGRAM(s) IV Intermittent every 24 hours  tiotropium 18 MICROgram(s) Capsule 1 Capsule(s) Inhalation daily    MEDICATIONS  (PRN):  acetaminophen   Tablet .. 650 milliGRAM(s) Oral every 6 hours PRN Temp greater or equal to 38.5C (101.3F), Mild Pain (1 - 3)  bisacodyl 5 milliGRAM(s) Oral every 12 hours PRN Constipation  LORazepam     Tablet 0.5 milliGRAM(s) Oral every 6 hours PRN Anxiety      Vital Signs Last 24 Hrs  T(C): 36.2 (09 Dec 2020 08:00), Max: 37.4 (08 Dec 2020 22:30)  T(F): 97.2 (09 Dec 2020 08:00), Max: 99.3 (08 Dec 2020 22:30)  HR: 84 (09 Dec 2020 14:00) (68 - 95)  BP: 121/58 (09 Dec 2020 14:00) (101/50 - 188/78)  BP(mean): 82 (09 Dec 2020 14:00) (74 - 114)  RR: 18 (09 Dec 2020 14:00) (16 - 24)  SpO2: 97% (09 Dec 2020 14:00) (86% - 97%)  I&O's Summary    08 Dec 2020 07:01  -  09 Dec 2020 07:00  --------------------------------------------------------  IN: 1562 mL / OUT: 2050 mL / NET: -488 mL    09 Dec 2020 07:01  -  09 Dec 2020 14:43  --------------------------------------------------------  IN: 250 mL / OUT: 500 mL / NET: -250 mL        LABS  LABS:  CAPILLARY BLOOD GLUCOSE                              9.6    3.71  )-----------( 162      ( 09 Dec 2020 02:00 )             30.2       12-09    139  |  108  |  15  ----------------------------<  148<H>  4.5   |  21  |  0.8    Ca    8.6      09 Dec 2020 02:00  Phos  3.7     12-09  Mg     2.2     12-09        PT/INR - ( 09 Dec 2020 02:00 )   PT: 14.10 sec;   INR: 1.23 ratio         PTT - ( 09 Dec 2020 02:00 )  PTT:41.9 sec          Culture - Blood (collected 07 Dec 2020 11:28)  Source: .Blood Blood-Peripheral  Preliminary Report (08 Dec 2020 23:01):    No growth to date.            Assessment & Plan:      75y Female HD#5 POD#0 s/p left TCAR, right groin access 2/2 >80% stenosis of left ICA, recent TIA    NEURO:    Acute pain-controlled with  Tylenol, avoid narcotics    s/p Left Tcar -q2 neurochecks    hx of TIA-on ASA  -stroke code - patient difficult word finding  -CT hd: No evidence of acute intracranial abnormality  -CT: Normal perfusion images of the brain.  -MRI brain: no acute infact  -keep SBP >110  -started on ASA/plavix/pradaxa  --EEG done -->  generalized slowing   -h/o Bipolar - restarted all psych meds, lamictal, seroquel, ativan prn    RESP:     Oxygen insufficiency-wean to 5L nc (on home 5L oxygen)    hx of COPD----On Albuterol inhalers     hx of smoking (quit 10+yrs)-encourage IS    Pulm c/s-recs triple inhaler therapy, keep sat >92%    Activity-OOB2C      CARDS:     SBP continue to keep goal 110, now off barb    Vasc sx team made aware of possible stent narrowing, awaiting recs    hx of paroxAfib -on pradaxa, on home metoprolol     hx of hld-restarted atorvastatin    Imaging: post op EKG NSR    Labs: CE x3 negative     GI/NUTR:     Diet-CC Regular    GI Prophylaxis-Pepcid (home rx)    MVI    /RENAL:     Monitor UO, voiding without difficulty    BUN/Cr- 22/0.9 stable    Electrolytes-Na 139 // K 4.5 // Mg 2.2 //  Phos 3.7 12-09 @ 02:00    Na 138 // K 3.9 // Mg -- //  Phos -- 12-08 @ 16:29    HEME/ONC:     DVT prophylaxis- Pradaxa, ASA/Plavix    Hb/Hct: 10.7 > 10.1    DVT sono: final  neg    ID:  D/C all abx  Per ID Convalexcent plasma 2units, Remdesivir 200x1, Remdesivir 100 x4 days  Leukocytosis resolved- 3.71  <<---,  4.22  <<---  Temp trend- 24hrs T(F): 97.2 (12-09 @ 08:00), Max: 99.3 (12-08 @ 22:30)  Antibiotics-remdesivir  IVPB 100 every 24 hours       Culture - Blood  Source: .Blood Blood-Peripheral  Preliminary Report:    No growth to date.  UA negative    ENDO:  -RISS  -A1c 5.2%  Dexamethasone 6q24 until discharge or 10days which ever is longer    LINES/DRAINS:  PIV      Follow Up:  -2330 labs  -final blood culture results    Signed out to: Jesus Ndiaye MD  Date: 12/9/2020  Time: 1522 [Home] : at home, [unfilled] , at the time of the visit. [Other Location: e.g. Home (Enter Location, City,State)___] : at [unfilled] [Patient] : Patient [FreeTextEntry1] : "Depression, anxiety, and confusion"

## 2023-05-11 NOTE — PATIENT PROFILE ADULT - NSPROMEDSBROUGHTTOHOSP_GEN_A_NUR
Patient: Lucy Chapin Date: 2023   : 1951 Attending: Adiel Hensley MD   72 year old female        Chief Complaint:   Post kidney transplant   Immune suppression management   Hypertension    HPI:  This is a 72 year old female with a H/O ESRD due to HTN nephrosclerosis with secondary FSGS s/p S/P NKR LDKT 3/12/19 (with Thymogobulin induction).    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      Post transplant course:  Patient presented to [facility] ED 19 due to fever. ED work-up revealed elevated WBC (13.3). Patient pan cultured. ID and transplant Nephrology consulted. Patient admitted for further evaluation and management. Patient was pan cultured on admission and ID was consulted. Patient was started on empiric antibiotics. All cultures returned as negative and she was afebrile through her stay. She was discharged in stable condition. She has follow up arranged to see transplant surgery on 19.      Recent events as of today 23    Ms. Chapin states that she is great today and has no acute complaints  Her main complaint is pain at the site of her fistula. She notes that she feels it 'stopped working\" many months ago.      Otherwise, she is doing well and is without complaints. She recently attended a wedding and was able to dance throghout the evening which made her feel that her health has returned.    Denies chills, abdominal pain, nausea, vomiting , chest pain, shortness of breath, dysuria, hematuria, skin rash or joint pain.      Past Medical History:   Diagnosis Date   • Anemia    • Carotid stenosis     < 50%   • Cataracts, bilateral    • Coronary artery disease 10/2018    Minimal non-obstructive CAD   • CRF (chronic renal failure)    • Cutaneous lupus erythematosus     SD  negative -    • Draining postoperative wound 03/2019    Right abdomen   • Fracture     Great toe - right   • Gastroesophageal reflux disease    • History of shingles ~ Fall, 2017    Right side from breast to back   • HTN (hypertension) 11/10/2011   • Hyperlipidemia    • Hypertension    • Lupus nephritis (CMD)    • Normal vaginal delivery     X 2   • Obesity    • Osteoporosis 07/2019   • Other chronic pain     Fibromyalgia   • Pneumonia    • PONV (postoperative nausea and vomiting)     just once   • RAD (reactive airway disease)    • S/P kidney transplant 03/2019   • Sigmoid diverticulosis 03/18/2019    per CT scan   • Sleep apnea     CPAP    • Transfusion history 03/2019    s/p transplant for low H/H   • Wears glasses        Past Surgical History:   Procedure Laterality Date   • Abdomen surgery     • Av fistula placement Right 2013   • Bariatric surgery  03/19/2018    Dr Rhodes - sleeve gastroplasty    • Biopsy of breast, needle core Left     Benign left   • Breast biopsy Left     Benign bx   • Breast surgery     • Cardiac catherization  10/2018    Right/Left heart cath - Minimal non-obstructive CAD   • Colonoscopy  09/05/2017   • Colonoscopy  07/30/2018   • Cystoscopy  04/03/2019    Dr. Bhakta - unsuccessfull attempt to remove ureteral stent   • Egd  07/10/2019   • Explore parathyroid glands  06/13/2022    Bilateral inferior parathyroid; biospies of superior glands; ioPTH drop 71%; Dr. Hand   • Eye surgery  04/05/2017    Right cataract extraction with IOL implant   • Eye surgery  04/19/2017    Left cataract extraction with IOL implant   • Hb ureteral dilation  04/04/2019    Dr Terrence Tran: CYSTOSCOPY WITH URETERAL DILATION AND STENT REMOVAL   • Hysterectomy  04/29/2010   • Ir thyroid biopsy Left 02/01/2022   • Kidney transplant  03/12/2019    Living donor transplant   • Peritoneal catheter insertion      Removal 3/12/2019 - after transplant   • Removal gallbladder     • Shoulder surgery Right 12/2020        Medications/Infusions:  Current Outpatient Medications   Medication Sig Dispense Refill   • famotidine (PEPCID) 20 MG tablet TAKE 1 TABLET BY MOUTH TWICE A  tablet 0   • TACROlimus (PROGRAF) 0.5 MG capsule Take 1 capsule by mouth every evening. 90 capsule 0   • Biotin 5000 MCG Tab Take 5,000 mcg by mouth daily.     • TACROlimus (PROGRAF) 1 MG capsule Current dose 2 mg am and 1.5 mg in pm 90 capsule 11   • TACROlimus (PROGRAF) 0.5 MG capsule Take 1 capsule by mouth every evening. 90 capsule 3   • albuterol 108 (90 Base) MCG/ACT inhaler Inhale 2 puffs into the lungs every 4 to 6 hours as needed for Shortness of Breath or Wheezing. 1 each 11   • simvastatin (ZOCOR) 20 MG tablet Take 1 tablet by mouth nightly. 90 tablet 1   • montelukast (SINGULAIR) 10 MG tablet TAKE 1 TABLET BY MOUTH EVERY DAY IN THE EVENING 90 tablet 3   • calcium-magnesium-vitD (Citracal Slow Release) 600- MG-MG-UNIT extended-release tablet Take 2 tablets by mouth in the morning and 2 tablets in the evening.     • mycophenolate (CELLCEPT) 500 MG tablet Take 1 tablet by mouth in the morning and 1 tablet in the evening. 180 tablet 3   • calcium carbonate, antacid, (Tums Ultra 1000) 1000 MG chewable tablet Chew 1 tablet by mouth in the morning and 1 tablet at noon and 1 tablet in the evening. 90 tablet 3   • predniSONE (DELTASONE) 5 MG tablet TAKE 1 TABLET BY MOUTH DAILY 90 tablet 3   • gabapentin (NEURONTIN) 600 MG tablet TAKE 1 TABLET BY MOUTH 2 HOURS PRIOR TO BEDTIME DAILY     • acetaminophen (TYLENOL) 500 MG tablet Take 500 mg by mouth every 6 hours as needed for Pain. Maximum of 2000mg per day     • Multiple Vitamins-Minerals (PreserVision AREDS 2) Cap Take 1 tablet by mouth daily. 30 capsule    • Cyanocobalamin (Vitamin B12) 500 MCG Tab Take 1 tablet by mouth daily.     • clindamycin (CLEOCIN) 300 MG capsule Take 2 capsules 1 hour before any dental work. 2 capsule 3   • diclofenac (VOLTAREN) 1 % gel Apply 2 g topically 4 times  daily. (Patient taking differently: Apply 2 g topically 4 times daily as needed.) 300 g 0   • Multiple Vitamins-Minerals (MULTIVITAMIN ADULT) Tab Take 1 tablet by mouth 2 times daily. Takes 1 tablet twice daily ([employer] brand without Vitamin D and A)     • aspirin 81 MG chewable tablet Chew 1 tablet by mouth daily. 90 tablet 3     No current facility-administered medications for this visit.         ALLERGIES:   Allergen Reactions   • Contrast Media Other (See Comments)     2020 - Kidney Transplant Recipient, call the Abdominal Transplant Clinic at [phone] prior to administering IV contrast.    3/8/2019, CT contrast given, odd sensation noted on R side of throat. Gone in less than 5 minutes. Per Radiologist unclear if it is a true allergy. Questionable if pre medication is needed.     • Keflex RASH   • Adhesive   (Environmental) Dermatitis   • Latex   (Environmental) RASH   • Penicillins RASH     Pt tolerates amoxicillin and cephalexin OK   • Sulfa Drugs Cross Reactors RASH       Social History     Socioeconomic History   • Marital status: Single     Spouse name: Not on file   • Number of children: Not on file   • Years of education: Not on file   • Highest education level: Not on file   Occupational History   • Not on file   Tobacco Use   • Smoking status: Former     Current packs/day: 0.00     Average packs/day: 1 pack/day for 25.0 years (25.0 pk-yrs)     Types: Cigarettes     Start date: 10/10/1971     Quit date: 10/10/1996     Years since quittin.6   • Smokeless tobacco: Never   Vaping Use   • Vaping status: never used   Substance and Sexual Activity   • Alcohol use: No     Alcohol/week: 0.0 standard drinks of alcohol   • Drug use: No   • Sexual activity: Not on file   Other Topics Concern   • Not on file   Social History Narrative    What are your living arrangements? alone        What type of residence do you live in?  Apartment - senior living        Any potential patient safety issues?  none        Do you drive yourself? drives self        Any financial problems?  none     Social Determinants of Health     Financial Resource Strain: Low Risk  (3/3/2022)    Financial Resource Strain    • Social Determinants: Financial Resource Strain: None   Food Insecurity: No Food Insecurity (3/3/2022)    Food Insecurity    • Social Determinants: Food Insecurity: Never   Transportation Needs: No Transportation Needs (3/3/2022)    PRAPARE - Transportation    • Lack of Transportation (Medical): No    • Lack of Transportation (Non-Medical): No   Physical Activity: Sufficiently Active (3/3/2022)    Exercise Vital Sign    • Days of Exercise per Week: 7 days    • Minutes of Exercise per Session: 30 min   Stress: Low Risk  (3/3/2022)    Stress    • Social Determinants: Stress: A little bit   Social Connections: Socially Integrated (3/3/2022)    Social Connections    • Social Determinants: Social Connections: 5 or more times a week   Intimate Partner Violence: Not At Risk (6/8/2022)    Intimate Partner Violence    • Social Determinants: Intimate Partner Violence Past Fear: No    • Social Determinants: Intimate Partner Violence Current Fear: No       Family History   Problem Relation Age of Onset   • Cancer Mother         colon   • Substance abuse Father         alchol   • Blood Disorder Sister         Clotting disorder - on Coumadin   • Patient is unaware of any medical problems Sister    • Patient is unaware of any medical problems Sister    • Patient is unaware of any medical problems Sister    • Diabetes Brother    • Patient is unaware of any medical problems Brother    • Patient is unaware of any medical problems Brother    • Diabetes Daughter    • Sarcoidosis Daughter    • Patient is unaware of any medical problems Son    • Cancer, Breast Paternal Grandmother        ROS: Other than what's mentioned in the history of present illness, the remainder of review of systems was reviewed and was negative.    Physical  Exam:  Vital signs:  Blood pressure 136/72, pulse 76, temperature 97.5 °F (36.4 °C), temperature source Temporal, weight 80.4 kg (177 lb 4.8 oz), SpO2 98 %.  General:  Alert, chronically ill appearing, no acute distress.  Neck:  Supple, no JVD, no thyromegaly, no carotid bruit.  Chest/Lungs:  Good bilateral air entry, no wheezing, no crackles, resonant percussion note, no deformities.  CVS:  S1 and S2+. No rub, no murmurs, no gallops.  Abdomen:  Nontender, no hepatosplenomegaly, negative shifting dullness, no bruit, no hernias. No other masses.  Well-healed Tx incision  Neuro:  Intact cranial nerves II-XII. Nonfocal neurological deficit, no sensory deficits, intact reflexes.  Psychiatric:  A&O x3, appropriate mood and affect.   Skin:  Warm, dry, intact. No rashes.   Extremities:  no edema, no cyanosis or clubbing.  Musculoskeletal:  No joint deformity, no swelling, no redness, no restriction of joint movement.  5/5 muscle strength in LUE, light touch sensation intact bilaterally  Dialysis access:  Right arm AVF without thrill/bruit        Laboratory Results:    I personally reviewed the following labs.    WBC (K/mcL)   Date Value   04/10/2023 7.8     RBC (mil/mcL)   Date Value   04/10/2023 4.72     HCT (%)   Date Value   04/10/2023 40.5     HGB (g/dL)   Date Value   04/10/2023 12.6     PLT (K/mcL)   Date Value   04/10/2023 321     Sodium (mmol/L)   Date Value   04/10/2023 143     Potassium (mmol/L)   Date Value   04/10/2023 4.5     Chloride (mmol/L)   Date Value   04/10/2023 107     Glucose (mg/dL)   Date Value   04/10/2023 87     Calcium (mg/dL)   Date Value   04/10/2023 8.7     Carbon Dioxide (mmol/L)   Date Value   04/10/2023 28     BUN (mg/dL)   Date Value   04/10/2023 29 (H)     Creatinine (mg/dL)   Date Value   04/10/2023 0.99 (H)         Radiologic Studies:  I personally reviewed the following:  Us kidney transplant 3/15/19     Findings:   Scans of the renal transplant in the right iliac fossa demonstrate  normal  morphology. The kidney measures 11.0 cm in length. There is no collecting  system dilatation, contour deforming mass or nephrolithiasis. Along the  superolateral aspect of the transplant kidney, there is a 2.9 x 1.6 x 2.3  cm hypoechoic collection which was not seen previously and could represent  postoperative fluid/blood products..     Color Doppler imaging demonstrates normal vascularization. Resistive  indices from the intrarenal arteries in the upper, middle, and lower  portions of the transplant kidney are 0.69, 0.77, and 0.75, all of which  are normal. Arterial waveforms are normal. Peak systolic velocity in the  main renal artery proximally measures 242 cm/s (previously 269 cm/s). The  main renal vein is patent.     Urinary bladder is not adequately visualized secondary to decompression  over a Barros catheter.        Impression:   1. Continued normalization of peak systolic velocities in the renal artery.     2. Normal grayscale and Doppler evaluation of the right lower quadrant  transplant kidney.      3. New 2.9 x 1.6 x 2.3 cm hypoechoic collection along the superolateral  aspect of the transplant kidney, possibly postoperative fluid/blood  products.        Transplant Course:    1. Transplant date: 3/12/2019  2. Organ type (living, , PHS high risk): live, unrelated  3. Primary disease: HTN nephrosclerosis with secondary FSGS  4. PRA/Cross match/HLA typing:   PRA (%)  Class I - 0%  PRA (%)  Class II - 0%     T Cell and B Cell Crossmatch is NEGATIVE     Recipient       A11,A30,B7,B35,Bw6,Cw4,Cw7,DR4,DR15,DQ8,DQ6    Donor       A2,A24,B62,B49,Bw4,Bw6,Cw9,Cw7,DR17,DR4,DQ2,DQ8    8 mismatches - A2, A24, B62, B49, Bw4, Cw9, DR17, DQ2     5. Cold ischemia: 5 hours 44 minutes  Warm ischemia time: 34 minutes  6. Induction agents:   Medication Dose Dates Given   Solumedrol 500 mg 3/12/19   Cellcept 1 g  3/12/19    Thymoglobulin Low dose POD 0,1,3      7. Maintenance immune suppression:    Prograf  goal 6-8, CC 500 mg BID d/t low wbc's, Prednisone 5 mg daily.  8. Prophylactic antibiotics:   -- Fungal. Mycelex 10 mg QID x 1 month (end date 4/12/19).  -- PCP. Dapsone 100 mg QD x 6 months (end date 9/12/19).   -- UTI. Cipro 250 mg BID x 3 months (end date 6/12/19).   -- CMV (D+/R-). Valcyte 450 mg daily x 6 months (end date 9/12/19). 450 mg daily d/t wbc's  9. Postoperative complications/stent removal: Stent removal 4/3. Arrive at 1315 for 1400 procedure.  10. Serologies:            Serologies:  Test Recipient Donor   Hepatitis A Negative Negative   Hepatitis B Negative Negative   Hepatitis C Negative Negative   HIV Negative Negative   CMV Negative Positive   EBV Negative Positive   HSV Negative Negative   RPR Negative Negative      11. Allograft Function/baseline creatinine: ~0.9   12. Episodes of rejection/biopsies: non  13. Post-transplant infections: none  14. Vaccinations:             - PNA 23: 11/20/19 no more needed              - PNA 13: 2/22/2016             - Influenza: yearly             - tDap: 3/17/22             - Hep B: 1/30/2012             - Shingles: 7/10/2020, 9/11/2020              -Covid: 3/22/2022, 11/1/2021, 2/28/2021, 2/7/2021           -Evusheld 4/8/22  15. Health Maintenance:             - Annual tests due date: March (last done 4/2023)             - Lipid panel: yearly, last 4/2023             - Bone density: 7/24/19 Osteoporosis Due in June/2023, saw Endocrine 2/2023             - Colonoscopy:last done 5/17/21 tubular adenoma  hyperplastic polyps. Repeat  5 year             - Dermatology: 1/2023 - BCC on frontal scalp 3/1/2023 - Mohs procedure, 3/30/23 denied Mohs for BCC on vertex.             - Mammo: 11/10/22             -Pap:7/24/2017 Dr Villalpando (S/P THI no additional paps required             - Native Kidney Imaging:3/7/2019                           - Bilateral inferior parathyroidectomy 6/13/22              ASSESSMENT, PLAN AND RECOMMENDATIONS:    1. H/O ESRD due to HTN  nephrosclerosis with secondary FSGS S/P LDKT 3/12/19 (with low dose Thymoglobulin induction).   · Baseline creatinine now 0.9 , remains at baseline  · Ureteral stent removed 4/4/19.   · Continue ASA.  · Follow urine protein/createinine ratio monthly for now due to history of FSGS. Last was at 6 mg/g  · Hx of microscopic hematuria , now resolved     2. Prophylactic Immunosuppression.   · On Prograf 1.5 mg Q AM/ 1.5 mg Q PM. Trough goal 6-8 ng/mL.   · Cellcept 500mg BID (lower due to neutropenia).   · Prednisone 5 mg daily indefinitely.    3. Antibiotic Prophylaxis.   · completed    5. GERD/early satiety.   · Pepcid. Previously on Carafate but has self discontinued. has now stopped carafate  · Follows with local GI MD (Dr. Prescott)  · EGD 7/10/19 - gastritis, H pylori negative  · Pt feeling better, now gaining weight     6. Hypomagnesemia:   · Off of supplements  · Last Mag level was at 1.8     7. Tertiary hyperparathyroidism/ Hypercalcemia:  · PTH is at 46 last checked  · Will follow up PTH and vitamin D  · Got parathyroidectomy in 6/2022 by  due to severe osteoporosis   · Choleocalciferol 2,000 IU daily   · ON calcium supplements ( caltrate) 1200 BID  · Check ionized calcium as she has some twitching at time     8. Clotted Old right radio cephalic AV Fistula - suspect thrmobosed  · Pt to be evaluated by [facility] surgery -  but he retired  · Functioning Brachiocephalic right AVF, but has pain in her right hand   · Will send her to see     9. Systolic ejection murmur:  · Will get an Echo     Adiel Hensley MD  Transplant Nephrology     no

## 2023-05-24 NOTE — ED PROVIDER NOTE - ATTENDING WITH...
Patient left me a message to call him then  I left a message for patient to call me  FRANCESCA nAdre [facility] HospitalWENDY  5/24/23 Resident

## 2023-06-13 NOTE — PATIENT PROFILE ADULT - FALL HARM RISK - RISK INTERVENTIONS
Assistance OOB with selected safe patient handling equipment/Assistance with ambulation/Communicate Fall Risk and Risk Factors to all staff, patient, and family/Discuss with provider need for PT consult/Monitor gait and stability/Provide patient with walking aids - walker, cane, crutches/Reinforce activity limits and safety measures with patient and family/Visual Cue: Yellow wristband/Bed in lowest position, wheels locked, appropriate side rails in place/Call bell, personal items and telephone in reach/Instruct patient to call for assistance before getting out of bed or chair/Non-slip footwear when patient is out of bed/[place] to call system/Physically safe environment - no spills, clutter or unnecessary equipment/Purposeful Proactive Rounding/Room/bathroom lighting operational, light cord in reach [place] Counseling: I discussed with the patient the risks of tildrakizumab including but not limited to immunosuppression, malignancy, posterior leukoencephalopathy syndrome, and serious infections.  The patient understands that monitoring is required including a PPD at baseline and must alert us or the primary physician if symptoms of infection or other concerning signs are noted.

## 2023-07-13 NOTE — ED ADULT NURSE NOTE - PMH
(E4) spontaneous Allergic reaction    Bipolar 1 disorder    COPD (chronic obstructive pulmonary disease)    Depression    Hypertension    Other cardiomyopathy    Other emphysema    PVD (peripheral vascular disease)    TIA (transient ischemic attack) Allergic reaction    Bipolar 1 disorder    Congestive heart failure    COPD (chronic obstructive pulmonary disease)    Depression    Hypertension    FLAVIO on CPAP    Other cardiomyopathy    Other emphysema    PVD (peripheral vascular disease)    TIA (transient ischemic attack)    Transient ischemic attack

## 2023-08-23 NOTE — H&P ADULT - NSICDXPASTMEDICALHX_GEN_ALL_CORE_FT
PAST MEDICAL HISTORY:  Allergic reaction     Bipolar 1 disorder     Congestive heart failure     COPD (chronic obstructive pulmonary disease)     Depression     Falls     Hypertension     FLAVIO on CPAP     Other cardiomyopathy     Other emphysema     PVD (peripheral vascular disease)     TIA (transient ischemic attack)     Transient ischemic attack      yes

## 2023-11-01 NOTE — DIETITIAN INITIAL EVALUATION ADULT - ENERGY INTAKE
Detail Level: Detailed
Pt had % tray intake while on PO diet 12/18. Made NPO 12/19 AM and was kept NPO through 12/20 PM before TF started.

## 2023-11-28 NOTE — ED PROVIDER NOTE - PRINCIPAL DIAGNOSIS
Chief Complaint   Patient presents with    Atrial Fibrillation       Visit Vitals  /66   Pulse 62   Ht 5' 8\" (1.727 m)   Wt 104.8 kg (231 lb)   BMI 35.12 kg/m²       Eddie is a 69 year old patient, being seen for 6 month  follow up of AF. He is s/p admission for  tikosyn load and DCCV on 10/27/21. He was seen originally for asymptomatic atrial fibrillation.  ECHO 10/26/21  EF= 54%. He is currently manged on tikosyn 500mcg BID, ASA 81mg daily. Creat normal 10/2023 He has been recommended to start anticoagulation but has not due to cost.     Patient notes he is feeling well from an AF standpoint. He notes he still is not on anticoagulation due to cost. Patient notes he was recently informed by his insurance company and will possibly start Eliquis due to cost being affordable. He is tolerating his medications well. He denies any episodes of lightheadedness, dizziness, palpitations, chest pain, shortness of breath or syncope. Patient states he is doing ok from a rhythm standpoint.        ALLERGIES:  No Known Allergies     Current Outpatient Medications   Medication Sig    blood glucose meter Test blood sugar 1 time daily as directed. Diagnosis: DM 2. Meter: best fit for patient/insurance.    blood glucose test strip Test blood sugar 1 time daily as directed. Diagnosis: DM2. Meter: best fit for patient/insurance.  #100/3 mo    blood glucose lancets Test blood sugar 1 time daily as directed. Diagnosis: DM2. Meter: best fit for patient/insurance. #100/3mo    rosuvastatin (CRESTOR) 40 MG tablet TAKE 1 TABLET BY MOUTH EVERY DAY    dofetilide (TIKOSYN) 500 MCG capsule TAKE 1 CAPSULE BY MOUTH EVERY 12 HOURS    furosemide (LASIX) 40 MG tablet TAKE 1 TABLET BY MOUTH EVERY DAY    lisinopril (ZESTRIL) 20 MG tablet Take 1 tablet by mouth daily.    ASPIRIN 81 PO Take 1 tablet by mouth daily.    olopatadine (PATANOL) 0.1 % ophthalmic solution Place 1 drop into both eyes in the morning and 1 drop in the evening.    Omega-3 Fatty  Acids (Fish Oil) 1000 MG capsule Take 2 g by mouth daily. Patient takes BID    finasteride (PROSCAR) 5 MG tablet TAKE 1 TABLET BY MOUTH EVERY DAY (Patient taking differently: Take 5 mg by mouth at bedtime.)    Coenzyme Q10 200 MG Cap Take 1 capsule by mouth daily.     B Complex-Biotin-FA (B-COMPLEX PO) Take 1 tablet by mouth daily.     Cholecalciferol (VITAMIN D) 2000 UNITS tablet Take 2,000 Units by mouth daily.     No current facility-administered medications for this visit.        Review of Systems   Constitutional: Negative for malaise/fatigue.   HENT: Negative for nosebleeds.    Cardiovascular: Negative for chest pain, dyspnea on exertion, irregular heartbeat, near-syncope, palpitations and syncope.   Respiratory: Negative for shortness of breath.    Hematologic/Lymphatic: Negative for bleeding problem.   Gastrointestinal: Negative for hematochezia.   Genitourinary: Negative for hematuria.   Neurological: Negative for dizziness and light-headedness.        Physical Exam  Vitals and nursing note reviewed.   Constitutional:       General: He is not in acute distress.     Appearance: He is well-developed.   HENT:      Head: Normocephalic and atraumatic.   Cardiovascular:      Rate and Rhythm: Normal rate and regular rhythm.      Heart sounds: Normal heart sounds.   Pulmonary:      Effort: Pulmonary effort is normal. No respiratory distress.      Breath sounds: Normal breath sounds.   Musculoskeletal:      Comments: Ambulatory without assistance   Neurological:      Mental Status: He is alert and oriented to person, place, and time.   Psychiatric:         Behavior: Behavior normal.         Thought Content: Thought content normal.         Judgment: Judgment normal.            Assessment & Plan      Paroxysmal atrial fibrillation (CMD)  Echocardiogram: 2014  LVEF:   Ejection Fraction   Date Value Ref Range Status   10/26/2021 54 % Final   LA size:33.5 ml/m².  IVS: 1.3ccm  LVPW:1.3 cm  CHADs score: 3 (AGE, HTN,  DM)  Anticoagulation: none  Antiarrhythmic: Tikosyn    EKG in office NSR  We discussed the pathophysiology of atrial fibrillation including the remodeling of the heart that occurs and the risk for stroke which is approximately  3 % per year. Patient was previously anticoagulated which he discontinued  due to cost. Due to [place] of 3  suggest being anticoagulated for stroke prevention.     Mateusz Higgins    Discussed signs and symptoms of stroke with patient and advised him to seek medical care if experiencing them. He will look into cost for alternative anticoagulation therapy and call the office if wishing to start.     Patient notes he is feeling well from an AF standpoint. He notes he still is not on anticoagulation due to cost. He is tolerating his medications well. He denies any episodes of lightheadedness, dizziness, palpitations, chest pain, shortness of breath or syncope. Patient states he is doing ok from a rhythm standpoint.           Patient is at an increased risk for bleeding in view of medication non compliance, frequently missed or forgotten doses .  Discussed with patient the findings and options of management.   Continue current therapy and monitor  Left atrial appendage closure device (Watchman), Procedure, risks, benefits and alternatives were discussed with patient in detail. Advised patient they will need to be on anticoagulation for approximately 45 days post implant. If DEANNA shows no leak anticoagulation will be discontinued and  Plavix and ASA  will be started.  Discontinue anticoagulation therapy with increased risk for stroke    Watchman education information booklet provided to patient.  At this time the patient agrees to consider his options.         High risk medication use- Tikosyn  Creatinine (mg/dL)   Date Value   10/18/2023 1.11     Encounter Date: 11/28/23   Electrocardiogram 12-Lead    Impression    Sinus bradycardia   Vent Rate 58 BPM  LA interval 202  ms  QRS  duration 78  ms  QT/QTc 442/433  ms        Reviewed EKG for QT and QRS changes.  Continue current dose        Plan:  continue current therapy, strongly advised anticoagulation therapy to protect from stroke.  Follow up in 6 months  Advised to exercise as tolerated. Warm up prior to working out and cool down after.    Testing Needed  Cardiac None  Labs none  Procedures: None    On 11/28/2023, IChinyere scribed the services personally performed by Dori Baldwin MD     The documentation recorded by the scribe accurately and completely reflects the service(s) I personally performed and the decisions made by me.   Dori Baldwin MD     Pulmonary edema

## 2023-12-29 NOTE — DISCHARGE NOTE ADULT - ADMISSION DATE +STARTOFVISITDATE
Patient wife Kayce called and states that the patient had a laparoscopic appendectomy 9/4/23. She states that the patient has had some irritation with his lower incision. Patient states that the incision is open about the size of 2 pencil leads. Patient just noticed the opening this morning. Patient states that he has some bleeding and redness. Patient denies fever. Please advise. Patients can be called back or the patients wife Kayce at [phone].   Statement Selected

## 2024-03-04 NOTE — CDI QUERY NOTE - NSCDIOTHERTXTBX_GEN_ALL_CORE_HH
pt is a 74 yo f h/o HTN, HLD, bipolar disorder, CHF, COPD on 4L NC at home noted to have slurred speech noted by .  reports pt took tramadol and a muscle relaxer for pain.     #afib s/p cardioversion in the field  continue with BB, metoprolol tartrate 100 milliGRAM(s) Oral two times a day  -Anticoagulation as per cardiology, rivaroxaban 20 milliGRAM(s)        #copd on home 02   - O2 prn, bipap prn   - continue singular   - budesonide 160 MICROgram(s)/formoterol 4.5 MICROgram(s) Inhaler 2 Puff(s) Inhalation two times a day        #CHF   - continued furosemide    Tablet 40 milliGRAM(s) Oral daily.     PAST MEDICAL & SURGICAL HISTORY:  Other cardiomyopathy.     From the previous admission on 02/10 2020 patient was treated  for chronic diastolic CHF     QUERY #1.     Based on clinical evidence and treatment please specify the Type of Afib.     QUERY #2.     Please clarify a diagnosis for use of home O2 @ 2L is:         •       Chronic Respiratory Failure.          •       Other findings, please comment          •       Unable to clinically determine.     QUERY #3.  Based on clinical evidence and treatment please specify CHF type and acuity        Thank you for your time and attention to this matter! Detail Level: Detailed Render Risk Assessment In Note?: no Additional Notes: Pt injects at home no known allergies

## 2024-03-28 NOTE — DISCHARGE NOTE NURSING/CASE MANAGEMENT/SOCIAL WORK - NSDCPEPTSTROKESIGNS_GEN_ALL_CORE
Clothing
Sudden numbness or weakness of the face, arm, or leg, especially on one side of the body. Confusion, trouble speaking or understanding. Trouble seeing in one or both eyes. Trouble walking, dizziness, loss of balance or coordination. Severe headache.

## 2024-04-30 NOTE — PROGRESS NOTE ADULT - ASSESSMENT
Group Topic:  Process Group     Date: 4/30/2024  Start Time: 10:00 AM  End Time: 11:15 AM  Facilitators: Arlyn Cochran LPC    Focus: Process  Number in attendance: 3    Pt was present for process group and was attentive, willing to share information and supportive of group members. Pt reported about their time outside of group and thoughts/feelings leading into today's rating scales and IOP treatment groups. Pt processed thoughts and feelings related to a panic attack he experienced yesterday. Talked through his day up until that point and identified spending time with friends and positive impact of same. Reports picking up his girlfriend and then feeling irritated and increased to a panic attack. Identified warning signs, thoughts, feelings and action/urges. Pt reports being able to stabilize after about 5-10 minutes and how he was able to do same. Reflected on hx of panic attacks, reports they started about a year ago and states \"sometimes they are over stupid things\". Discussed self-validation and invalidation and impact of same. Worked on reframing negative self talk, pt was receptive.     Safety: Client reports no plans or symptoms of suicidal ideation per symptom rating scale.     Symptoms:   Poor Memory/Concentration 8  Irritability, Negative Thoughts, Sleep Problems 7  Disorganization, Anger, Mood Swings, Sad/Tearful, Decreased Appetite 6  SI 0  Urge to Harm Self 0    Arlyn Cochran LPC    Method: Group  Attendance: Present  Participation: Active  Patient Response: Appropriate feedback and Attentive  Mood: Anxious  Affect: Type: Anxious   Range: Full (normal)   Congruency: Congruent   Stability: Stable  Behavior/Socialization: Cooperative and Engaged  Thought Process: [place] thinking  Task Performance: Follows directions  Patient Evaluation: Independent - full participation       IMPRESSION:  anaphylaxis shock   asthma   FLAVIO    PLAN:    CNS: no sedation     HEENT: oral care     PULMONARY: keep pox . 92 %   prednisone 60 mg and taper   cpap machine at night     CARDIOVASCULAR: keep Is = os     GI: GI prophylaxis.  feeding     RENAL: follow lytes     INFECTIOUS DISEASE: no abx for now     HEMATOLOGICAL:  DVT prophylaxis.    ENDOCRINE:  Follow up FS.  Insulin protocol if needed.    MUSCULOSKELETAL:  from pulmonary no need for ICU can be d/c home   on prednisone taper   symbicort   cpap machine   told need to do the ct scan         CRITICAL CARE TIME SPENT: *** IMPRESSION:  anaphylaxis shock   asthma   FLAVIO    PLAN:    CNS: no sedation   consider hold gabapentin can cause anaphylaxis   HEENT: oral care     PULMONARY: keep pox . 92 %   prednisone 60 mg and taper   cpap machine at night   hold symbicort   nebulizer Q4 hrs   CARDIOVASCULAR: keep Is = os     GI: GI prophylaxis.  feeding     RENAL: follow lytes     INFECTIOUS DISEASE: no abx for now     HEMATOLOGICAL:  DVT prophylaxis.    ENDOCRINE:  Follow up FS.  Insulin protocol if needed.    MUSCULOSKELETAL:    told need to do the ct scan         CRITICAL CARE TIME SPENT: *** IMPRESSION:  anaphylaxis shock   asthma   FLAVIO    PLAN:    CNS: no sedation   consider hold gabapentin can cause anaphylaxis   HEENT: oral care     PULMONARY: keep pox . 92 %   prednisone 60 mg and taper   cpap machine at night   hold symbicort   nebulizer Q4 hrs   CARDIOVASCULAR: keep Is = os   follow cardiology   troponin   GI: GI prophylaxis.  feeding     RENAL: follow lytes     INFECTIOUS DISEASE: no abx for now     HEMATOLOGICAL:  DVT prophylaxis.    ENDOCRINE:  Follow up FS.  Insulin protocol if needed.    MUSCULOSKELETAL:    told need to do the ct scan         CRITICAL CARE TIME SPENT: ***

## 2024-07-30 NOTE — DIETITIAN INITIAL EVALUATION ADULT. - NUTRITION DIAGNOSTIC TERMINOLOGY #1
Noted. I am so sorry to hear that she has been having higher glucose readings in the past month.     Taking steroid medication and also being infected with COVID-19 can certainly cause higher glucose readings.       Please advise patient to make the following adjustments:    - start taking glipizide 10mg twice daily   - can take first dose now and second with dinner around 7pm today.     -  continue with MTF 1,000mg twice daily   - increase Trulicity to 3mg once weekly       Rx sent to pharmacy for Trulicity. Please confirm that she has glipizide at home.     She should give us an update on her BG readings again tomorrow. If current hyperglycemia dose not seem to be improving by tomorrow, we may need to consider starting long acting insulin temporarily until her glucose patterns stabilize.     Please ask patient to make sure that she is staying well hydrated and works on eating a low carb diet for the rest of the day today (high protein and veggies).       If her symptoms worsen or develops chest pain/difficulty breathing to go in to ER for further evaluation.       Thank you!    Oral or Nutrition Support Intake

## 2024-09-06 NOTE — ED PROVIDER NOTE - NS ED MD DISPO DISCHARGE CCDA
I left a vm for the patient with an appt for a CT Sim on 9/13 at 1:00.    Patient/Caregiver provided printed discharge information.

## 2024-10-22 NOTE — PHYSICAL THERAPY INITIAL EVALUATION ADULT - LEVEL OF INDEPENDENCE: SIT/SUPINE, REHAB EVAL
max A X 2 with rolling/turning in bed limited by pain/dependent (less than 25% patients effort)
Spontaneous, unlabored and symmetrical

## 2025-01-09 NOTE — CONSULT NOTE ADULT - NS ATTEND BILL GEN_ALL_CORE
Attending to bill
on the discharge service for the patient. I have reviewed and made amendments to the documentation where necessary.

## 2025-02-19 NOTE — PHYSICAL THERAPY INITIAL EVALUATION ADULT - LEVEL OF INDEPENDENCE: GAIT, REHAB EVAL
Calm secondary to patient easily gets short of breath even while on Oxygen at   5 Li/min  via nasal cannula/unable to perform

## 2025-03-25 NOTE — ED PROCEDURE NOTE - CPROC ED TIME OUT STATEMENT1
“Patient's name, , procedure and correct site were confirmed during the [facility] Timeout.” Increase standing balance to Fair  by d/c.

## 2025-06-29 NOTE — H&P ADULT - NSICDXPASTMEDICALHX_GEN_ALL_CORE_FT
PAST MEDICAL HISTORY:  2019 novel coronavirus disease (COVID-19)     Afib     Bipolar 1 disorder     Congestive heart failure     COPD (chronic obstructive pulmonary disease)     Depression     Falls     HLD (hyperlipidemia)     Hypertension     FLAVIO on CPAP     Other cardiomyopathy     Other emphysema     PVD (peripheral vascular disease)     Respiratory failure requiring intubation     Transient ischemic attack x 3     Stable